# Patient Record
Sex: FEMALE | Race: WHITE | NOT HISPANIC OR LATINO | Employment: OTHER | URBAN - METROPOLITAN AREA
[De-identification: names, ages, dates, MRNs, and addresses within clinical notes are randomized per-mention and may not be internally consistent; named-entity substitution may affect disease eponyms.]

---

## 2023-01-01 ENCOUNTER — APPOINTMENT (OUTPATIENT)
Dept: RADIOLOGY | Facility: HOSPITAL | Age: 73
DRG: 312 | End: 2023-01-01
Payer: MEDICARE

## 2023-01-01 ENCOUNTER — NURSING HOME VISIT (OUTPATIENT)
Dept: POST ACUTE CARE | Facility: EXTERNAL LOCATION | Age: 73
End: 2023-01-01
Payer: MEDICARE

## 2023-01-01 ENCOUNTER — APPOINTMENT (OUTPATIENT)
Dept: DIALYSIS | Facility: HOSPITAL | Age: 73
End: 2023-01-01
Payer: MEDICARE

## 2023-01-01 ENCOUNTER — HOME CARE VISIT (OUTPATIENT)
Dept: HOME HEALTH SERVICES | Facility: HOME HEALTH | Age: 73
End: 2023-01-01
Payer: MEDICARE

## 2023-01-01 ENCOUNTER — HOME CARE VISIT (OUTPATIENT)
Dept: HOME HEALTH SERVICES | Facility: HOME HEALTH | Age: 73
End: 2023-01-01

## 2023-01-01 ENCOUNTER — APPOINTMENT (OUTPATIENT)
Dept: CARDIAC SURGERY | Facility: CLINIC | Age: 73
End: 2023-01-01
Payer: MEDICARE

## 2023-01-01 ENCOUNTER — TELEPHONE (OUTPATIENT)
Dept: PRIMARY CARE | Facility: CLINIC | Age: 73
End: 2023-01-01

## 2023-01-01 ENCOUNTER — APPOINTMENT (OUTPATIENT)
Dept: PRIMARY CARE | Facility: CLINIC | Age: 73
End: 2023-01-01
Payer: MEDICARE

## 2023-01-01 ENCOUNTER — HOME HEALTH ADMISSION (OUTPATIENT)
Dept: HOME HEALTH SERVICES | Facility: HOME HEALTH | Age: 73
End: 2023-01-01
Payer: MEDICARE

## 2023-01-01 ENCOUNTER — APPOINTMENT (OUTPATIENT)
Dept: PAIN MEDICINE | Facility: CLINIC | Age: 73
End: 2023-01-01
Payer: MEDICARE

## 2023-01-01 ENCOUNTER — TELEPHONE (OUTPATIENT)
Dept: CARDIAC SURGERY | Facility: CLINIC | Age: 73
End: 2023-01-01
Payer: MEDICARE

## 2023-01-01 ENCOUNTER — HOSPITAL ENCOUNTER (INPATIENT)
Facility: HOSPITAL | Age: 73
LOS: 30 days | Discharge: NURSING, REHAB OR LONG-TERM CARE | DRG: 312 | End: 2024-01-05
Attending: EMERGENCY MEDICINE | Admitting: STUDENT IN AN ORGANIZED HEALTH CARE EDUCATION/TRAINING PROGRAM
Payer: MEDICARE

## 2023-01-01 ENCOUNTER — NURSING HOME VISIT (OUTPATIENT)
Dept: POST ACUTE CARE | Facility: EXTERNAL LOCATION | Age: 73
End: 2023-01-01

## 2023-01-01 ENCOUNTER — APPOINTMENT (OUTPATIENT)
Dept: CARDIOLOGY | Facility: HOSPITAL | Age: 73
DRG: 312 | End: 2023-01-01
Payer: MEDICARE

## 2023-01-01 ENCOUNTER — TELEPHONE (OUTPATIENT)
Dept: HEMATOLOGY/ONCOLOGY | Facility: CLINIC | Age: 73
End: 2023-01-01
Payer: MEDICARE

## 2023-01-01 ENCOUNTER — TELEPHONE (OUTPATIENT)
Dept: CARDIAC SURGERY | Facility: CLINIC | Age: 73
End: 2023-01-01

## 2023-01-01 ENCOUNTER — HOSPITAL ENCOUNTER (OUTPATIENT)
Dept: DATA CONVERSION | Facility: HOSPITAL | Age: 73
Discharge: HOME | End: 2023-06-05
Attending: EMERGENCY MEDICINE

## 2023-01-01 VITALS
SYSTOLIC BLOOD PRESSURE: 90 MMHG | HEART RATE: 68 BPM | TEMPERATURE: 98.2 F | DIASTOLIC BLOOD PRESSURE: 54 MMHG | OXYGEN SATURATION: 96 % | RESPIRATION RATE: 20 BRPM

## 2023-01-01 VITALS
BODY MASS INDEX: 29.44 KG/M2 | OXYGEN SATURATION: 95 % | HEART RATE: 64 BPM | SYSTOLIC BLOOD PRESSURE: 92 MMHG | TEMPERATURE: 97.4 F | RESPIRATION RATE: 18 BRPM | DIASTOLIC BLOOD PRESSURE: 50 MMHG | WEIGHT: 160 LBS | HEIGHT: 62 IN

## 2023-01-01 VITALS
TEMPERATURE: 97 F | OXYGEN SATURATION: 99 % | SYSTOLIC BLOOD PRESSURE: 96 MMHG | DIASTOLIC BLOOD PRESSURE: 54 MMHG | RESPIRATION RATE: 16 BRPM | HEART RATE: 76 BPM

## 2023-01-01 VITALS
HEART RATE: 88 BPM | SYSTOLIC BLOOD PRESSURE: 110 MMHG | OXYGEN SATURATION: 95 % | DIASTOLIC BLOOD PRESSURE: 60 MMHG | RESPIRATION RATE: 16 BRPM | TEMPERATURE: 97 F

## 2023-01-01 VITALS
TEMPERATURE: 97.7 F | RESPIRATION RATE: 18 BRPM | DIASTOLIC BLOOD PRESSURE: 52 MMHG | OXYGEN SATURATION: 98 % | HEART RATE: 72 BPM | SYSTOLIC BLOOD PRESSURE: 100 MMHG

## 2023-01-01 VITALS
SYSTOLIC BLOOD PRESSURE: 106 MMHG | RESPIRATION RATE: 18 BRPM | HEART RATE: 76 BPM | TEMPERATURE: 98.2 F | DIASTOLIC BLOOD PRESSURE: 56 MMHG | OXYGEN SATURATION: 96 %

## 2023-01-01 VITALS — TEMPERATURE: 98.4 F

## 2023-01-01 VITALS — TEMPERATURE: 96.6 F

## 2023-01-01 DIAGNOSIS — C50.811 BILATERAL MALIGNANT NEOPLASM OF OVERLAPPING SITES OF BREAST IN FEMALE, UNSPECIFIED ESTROGEN RECEPTOR STATUS (MULTI): Primary | ICD-10-CM

## 2023-01-01 DIAGNOSIS — Z79.4 TYPE 2 DIABETES MELLITUS WITH OTHER SPECIFIED COMPLICATION, WITH LONG-TERM CURRENT USE OF INSULIN (MULTI): ICD-10-CM

## 2023-01-01 DIAGNOSIS — Z87.448 HISTORY OF END STAGE RENAL DISEASE: ICD-10-CM

## 2023-01-01 DIAGNOSIS — R19.7 DIARRHEA OF PRESUMED INFECTIOUS ORIGIN: Primary | ICD-10-CM

## 2023-01-01 DIAGNOSIS — Z17.0 MALIGNANT NEOPLASM OF OVERLAPPING SITES OF RIGHT BREAST IN FEMALE, ESTROGEN RECEPTOR POSITIVE (MULTI): ICD-10-CM

## 2023-01-01 DIAGNOSIS — I10 PRIMARY HYPERTENSION: ICD-10-CM

## 2023-01-01 DIAGNOSIS — Z95.1 S/P CABG (CORONARY ARTERY BYPASS GRAFT): Primary | ICD-10-CM

## 2023-01-01 DIAGNOSIS — S00.83XA CONTUSION OF OTHER PART OF HEAD, INITIAL ENCOUNTER: Primary | ICD-10-CM

## 2023-01-01 DIAGNOSIS — E11.69 TYPE 2 DIABETES MELLITUS WITH OTHER SPECIFIED COMPLICATION, WITH LONG-TERM CURRENT USE OF INSULIN (MULTI): Primary | ICD-10-CM

## 2023-01-01 DIAGNOSIS — Z17.0 MALIGNANT NEOPLASM OF OVERLAPPING SITES OF LEFT BREAST IN FEMALE, ESTROGEN RECEPTOR POSITIVE (MULTI): Primary | ICD-10-CM

## 2023-01-01 DIAGNOSIS — N18.4 TYPE 2 DIABETES MELLITUS WITH STAGE 4 CHRONIC KIDNEY DISEASE, WITHOUT LONG-TERM CURRENT USE OF INSULIN (MULTI): ICD-10-CM

## 2023-01-01 DIAGNOSIS — E11.69 TYPE 2 DIABETES MELLITUS WITH OTHER SPECIFIED COMPLICATION, WITH LONG-TERM CURRENT USE OF INSULIN (MULTI): ICD-10-CM

## 2023-01-01 DIAGNOSIS — C50.812 BILATERAL MALIGNANT NEOPLASM OF OVERLAPPING SITES OF BREAST IN FEMALE, UNSPECIFIED ESTROGEN RECEPTOR STATUS (MULTI): Primary | ICD-10-CM

## 2023-01-01 DIAGNOSIS — I05.8 MITRAL VALVE MASS: Primary | ICD-10-CM

## 2023-01-01 DIAGNOSIS — E78.2 MIXED HYPERLIPIDEMIA: ICD-10-CM

## 2023-01-01 DIAGNOSIS — I48.0 PAROXYSMAL ATRIAL FIBRILLATION (MULTI): ICD-10-CM

## 2023-01-01 DIAGNOSIS — Z79.4 TYPE 2 DIABETES MELLITUS WITH OTHER SPECIFIED COMPLICATION, WITH LONG-TERM CURRENT USE OF INSULIN (MULTI): Primary | ICD-10-CM

## 2023-01-01 DIAGNOSIS — M86.142: ICD-10-CM

## 2023-01-01 DIAGNOSIS — E87.20 LACTIC ACIDOSIS: ICD-10-CM

## 2023-01-01 DIAGNOSIS — B99.9 INFECTION: ICD-10-CM

## 2023-01-01 DIAGNOSIS — C50.812 MALIGNANT NEOPLASM OF OVERLAPPING SITES OF LEFT BREAST IN FEMALE, ESTROGEN RECEPTOR POSITIVE (MULTI): Primary | ICD-10-CM

## 2023-01-01 DIAGNOSIS — L08.9 WOUND INFECTION: ICD-10-CM

## 2023-01-01 DIAGNOSIS — I95.9 HYPOTENSION, UNSPECIFIED HYPOTENSION TYPE: Primary | ICD-10-CM

## 2023-01-01 DIAGNOSIS — I48.0 PAROXYSMAL ATRIAL FIBRILLATION (MULTI): Primary | ICD-10-CM

## 2023-01-01 DIAGNOSIS — I50.42 CHRONIC COMBINED SYSTOLIC AND DIASTOLIC HEART FAILURE (MULTI): ICD-10-CM

## 2023-01-01 DIAGNOSIS — B37.9 INFECTION DUE TO YEAST: Primary | ICD-10-CM

## 2023-01-01 DIAGNOSIS — E11.21 TYPE 2 DIABETES MELLITUS WITH DIABETIC NEPHROPATHY, UNSPECIFIED WHETHER LONG TERM INSULIN USE (MULTI): Primary | ICD-10-CM

## 2023-01-01 DIAGNOSIS — E11.22 TYPE 2 DIABETES MELLITUS WITH STAGE 4 CHRONIC KIDNEY DISEASE, WITHOUT LONG-TERM CURRENT USE OF INSULIN (MULTI): ICD-10-CM

## 2023-01-01 DIAGNOSIS — B37.9 YEAST INFECTION: Primary | ICD-10-CM

## 2023-01-01 DIAGNOSIS — I73.9 PERIPHERAL VASCULAR DISEASE (CMS-HCC): ICD-10-CM

## 2023-01-01 DIAGNOSIS — A04.72 C. DIFFICILE DIARRHEA: ICD-10-CM

## 2023-01-01 DIAGNOSIS — C50.811 MALIGNANT NEOPLASM OF OVERLAPPING SITES OF RIGHT BREAST IN FEMALE, ESTROGEN RECEPTOR POSITIVE (MULTI): ICD-10-CM

## 2023-01-01 DIAGNOSIS — T14.8XXA WOUND INFECTION: ICD-10-CM

## 2023-01-01 DIAGNOSIS — I50.42 CHRONIC COMBINED SYSTOLIC AND DIASTOLIC HEART FAILURE (MULTI): Primary | ICD-10-CM

## 2023-01-01 DIAGNOSIS — I73.9 PERIPHERAL VASCULAR DISEASE (CMS-HCC): Primary | ICD-10-CM

## 2023-01-01 DIAGNOSIS — R09.89 ABSENT PEDAL PULSES: Chronic | ICD-10-CM

## 2023-01-01 DIAGNOSIS — N18.4 CHRONIC KIDNEY DISEASE (CKD), STAGE IV (SEVERE) (MULTI): ICD-10-CM

## 2023-01-01 LAB
ACANTHOCYTES BLD QL SMEAR: NORMAL
ALBUMIN SERPL-MCNC: 2.2 G/DL (ref 3.5–5)
ALBUMIN SERPL-MCNC: 2.5 G/DL (ref 3.5–5)
ALBUMIN SERPL-MCNC: 2.6 G/DL (ref 3.5–5)
ALBUMIN SERPL-MCNC: 2.7 G/DL (ref 3.5–5)
ALBUMIN SERPL-MCNC: 2.8 G/DL (ref 3.5–5)
ALBUMIN SERPL-MCNC: 2.9 G/DL (ref 3.5–5)
ALBUMIN SERPL-MCNC: 3 G/DL (ref 3.5–5)
ALBUMIN SERPL-MCNC: 3.1 G/DL (ref 3.5–5)
ALBUMIN SERPL-MCNC: 3.2 G/DL (ref 3.5–5)
ALBUMIN SERPL-MCNC: 3.8 GM/DL (ref 3.5–5)
ALBUMIN/GLOB SERPL: 0.9 RATIO (ref 1.5–3)
ALP BLD-CCNC: 108 U/L (ref 35–125)
ALP BLD-CCNC: 126 U/L (ref 35–125)
ALP BLD-CCNC: 78 U/L (ref 35–125)
ALP BLD-CCNC: 88 U/L (ref 35–125)
ALT SERPL-CCNC: 13 U/L (ref 5–40)
ALT SERPL-CCNC: 21 U/L (ref 5–40)
ALT SERPL-CCNC: 24 U/L (ref 5–40)
ALT SERPL-CCNC: 7 U/L (ref 5–40)
ANION GAP BLDA CALCULATED.4IONS-SCNC: 5 MMO/L (ref 10–25)
ANION GAP BLDA CALCULATED.4IONS-SCNC: 6 MMO/L (ref 10–25)
ANION GAP SERPL CALC-SCNC: 10 MMOL/L
ANION GAP SERPL CALC-SCNC: 11 MMOL/L
ANION GAP SERPL CALC-SCNC: 13 MMOL/L
ANION GAP SERPL CALC-SCNC: 14 MMOL/L
ANION GAP SERPL CALC-SCNC: 14 MMOL/L
ANION GAP SERPL CALC-SCNC: 8 MMOL/L
ANION GAP SERPL CALC-SCNC: 9 MMOL/L
ANION GAP SERPL CALCULATED.3IONS-SCNC: 16 MMOL/L (ref 0–19)
ANTICOAGULANT: NORMAL
APPARATUS: ABNORMAL
APPARATUS: ABNORMAL
APTT PPP: 25.5 SEC (ref 22–32.5)
ARTERIAL PATENCY WRIST A: POSITIVE
ARTERIAL PATENCY WRIST A: POSITIVE
AST SERPL-CCNC: 14 U/L (ref 5–40)
AST SERPL-CCNC: 21 U/L (ref 5–40)
AST SERPL-CCNC: 40 U/L (ref 5–40)
AST SERPL-CCNC: 42 U/L (ref 5–40)
ATRIAL RATE: 85 BPM
BACTERIA BLD CULT: NORMAL
BACTERIA FLD CULT: NORMAL
BACTERIA SPEC RESP CULT: ABNORMAL
BASE EXCESS BLDA CALC-SCNC: 6.6 MMOL/L (ref -2–3)
BASE EXCESS BLDA CALC-SCNC: 7.1 MMOL/L (ref -2–3)
BASO STIPL BLD QL SMEAR: PRESENT
BASOPHILS # BLD AUTO: 0.01 X10*3/UL (ref 0–0.1)
BASOPHILS # BLD AUTO: 0.05 X10*3/UL (ref 0–0.1)
BASOPHILS # BLD AUTO: 0.06 K/UL (ref 0–0.22)
BASOPHILS # BLD AUTO: 0.09 X10*3/UL (ref 0–0.1)
BASOPHILS # BLD AUTO: 0.1 X10*3/UL (ref 0–0.1)
BASOPHILS # BLD AUTO: 0.11 X10*3/UL (ref 0–0.1)
BASOPHILS # BLD AUTO: 0.11 X10*3/UL (ref 0–0.1)
BASOPHILS # BLD AUTO: 0.12 X10*3/UL (ref 0–0.1)
BASOPHILS # BLD AUTO: 0.12 X10*3/UL (ref 0–0.1)
BASOPHILS # BLD AUTO: 0.13 X10*3/UL (ref 0–0.1)
BASOPHILS # BLD AUTO: 0.14 X10*3/UL (ref 0–0.1)
BASOPHILS # BLD AUTO: 0.14 X10*3/UL (ref 0–0.1)
BASOPHILS # BLD AUTO: 0.18 X10*3/UL (ref 0–0.1)
BASOPHILS NFR BLD AUTO: 0.1 %
BASOPHILS NFR BLD AUTO: 0.6 %
BASOPHILS NFR BLD AUTO: 0.7 % (ref 0–1)
BASOPHILS NFR BLD AUTO: 0.8 %
BASOPHILS NFR BLD AUTO: 0.9 %
BASOPHILS NFR BLD AUTO: 0.9 %
BASOPHILS NFR BLD AUTO: 1 %
BASOPHILS NFR BLD AUTO: 1.1 %
BASOPHILS NFR BLD AUTO: 1.1 %
BASOPHILS NFR BLD AUTO: 1.7 %
BASOPHILS NFR FLD MANUAL: 0 %
BILIRUB DIRECT SERPL-MCNC: 0.5 MG/DL (ref 0–0.2)
BILIRUB SERPL-MCNC: 0.6 MG/DL (ref 0.1–1.2)
BILIRUB SERPL-MCNC: 1 MG/DL (ref 0.1–1.2)
BILIRUB SERPL-MCNC: 1.1 MG/DL (ref 0.1–1.2)
BILIRUB SERPL-MCNC: 1.2 MG/DL (ref 0.1–1.2)
BLASTS NFR FLD MANUAL: 0 %
BODY TEMPERATURE: 37 DEGREES CELSIUS
BODY TEMPERATURE: 37 DEGREES CELSIUS
BUN SERPL-MCNC: 10 MG/DL (ref 8–25)
BUN SERPL-MCNC: 11 MG/DL (ref 8–25)
BUN SERPL-MCNC: 11 MG/DL (ref 8–25)
BUN SERPL-MCNC: 12 MG/DL (ref 8–25)
BUN SERPL-MCNC: 13 MG/DL (ref 8–25)
BUN SERPL-MCNC: 14 MG/DL (ref 8–25)
BUN SERPL-MCNC: 14 MG/DL (ref 8–25)
BUN SERPL-MCNC: 15 MG/DL (ref 8–25)
BUN SERPL-MCNC: 15 MG/DL (ref 8–25)
BUN SERPL-MCNC: 16 MG/DL (ref 8–25)
BUN SERPL-MCNC: 16 MG/DL (ref 8–25)
BUN SERPL-MCNC: 17 MG/DL (ref 8–25)
BUN SERPL-MCNC: 18 MG/DL (ref 8–25)
BUN SERPL-MCNC: 18 MG/DL (ref 8–25)
BUN SERPL-MCNC: 24 MG/DL (ref 8–25)
BUN SERPL-MCNC: 25 MG/DL (ref 8–25)
BUN SERPL-MCNC: 25 MG/DL (ref 8–25)
BUN SERPL-MCNC: 5 MG/DL (ref 8–25)
BUN SERPL-MCNC: 6 MG/DL (ref 8–25)
BUN SERPL-MCNC: 7 MG/DL (ref 8–25)
BUN SERPL-MCNC: 8 MG/DL (ref 8–25)
BUN SERPL-MCNC: 8 MG/DL (ref 8–25)
BUN SERPL-MCNC: 9 MG/DL (ref 8–25)
BUN SERPL-MCNC: <3 MG/DL (ref 8–25)
BUN/CREAT SERPL: 2.8 RATIO (ref 8–21)
BURR CELLS BLD QL SMEAR: NORMAL
BURR CELLS BLD QL SMEAR: NORMAL
C DIF TOX TCDA+TCDB STL QL NAA+PROBE: DETECTED
C DIFF TOX A+B STL QL IA: NEGATIVE
CA-I BLDA-SCNC: 1.08 MMOL/L (ref 1.1–1.33)
CA-I BLDA-SCNC: 1.16 MMOL/L (ref 1.1–1.33)
CALCIUM SERPL-MCNC: 8.1 MG/DL (ref 8.5–10.4)
CALCIUM SERPL-MCNC: 8.1 MG/DL (ref 8.5–10.4)
CALCIUM SERPL-MCNC: 8.2 MG/DL (ref 8.5–10.4)
CALCIUM SERPL-MCNC: 8.3 MG/DL (ref 8.5–10.4)
CALCIUM SERPL-MCNC: 8.4 MG/DL (ref 8.5–10.4)
CALCIUM SERPL-MCNC: 8.4 MG/DL (ref 8.5–10.4)
CALCIUM SERPL-MCNC: 8.5 MG/DL (ref 8.5–10.4)
CALCIUM SERPL-MCNC: 8.6 MG/DL (ref 8.5–10.4)
CALCIUM SERPL-MCNC: 8.7 MG/DL (ref 8.5–10.4)
CALCIUM SERPL-MCNC: 8.7 MG/DL (ref 8.5–10.4)
CALCIUM SERPL-MCNC: 8.8 MG/DL (ref 8.5–10.4)
CALCIUM SERPL-MCNC: 9 MG/DL (ref 8.5–10.4)
CALCIUM SERPL-MCNC: 9.6 MG/DL (ref 8.5–10.4)
CHLORIDE BLDA-SCNC: 100 MMOL/L (ref 98–107)
CHLORIDE BLDA-SCNC: 98 MMOL/L (ref 98–107)
CHLORIDE SERPL-SCNC: 100 MMOL/L (ref 97–107)
CHLORIDE SERPL-SCNC: 101 MMOL/L (ref 97–107)
CHLORIDE SERPL-SCNC: 102 MMOL/L (ref 97–107)
CHLORIDE SERPL-SCNC: 90 MMOL/L (ref 97–107)
CHLORIDE SERPL-SCNC: 94 MMOL/L (ref 97–107)
CHLORIDE SERPL-SCNC: 95 MMOL/L (ref 97–107)
CHLORIDE SERPL-SCNC: 96 MMOL/L (ref 97–107)
CHLORIDE SERPL-SCNC: 97 MMOL/L (ref 97–107)
CHLORIDE SERPL-SCNC: 98 MMOL/L (ref 97–107)
CHLORIDE SERPL-SCNC: 99 MMOL/L (ref 97–107)
CLARITY FLD: CLEAR
CO2 SERPL-SCNC: 22 MMOL/L (ref 24–31)
CO2 SERPL-SCNC: 23 MMOL/L (ref 24–31)
CO2 SERPL-SCNC: 23 MMOL/L (ref 24–31)
CO2 SERPL-SCNC: 24 MMOL/L (ref 24–31)
CO2 SERPL-SCNC: 25 MMOL/L (ref 24–31)
CO2 SERPL-SCNC: 26 MMOL/L (ref 24–31)
CO2 SERPL-SCNC: 26 MMOL/L (ref 24–31)
CO2 SERPL-SCNC: 27 MMOL/L (ref 24–31)
CO2 SERPL-SCNC: 28 MMOL/L (ref 24–31)
CO2 SERPL-SCNC: 29 MMOL/L (ref 24–31)
CO2 SERPL-SCNC: 30 MMOL/L (ref 24–31)
CO2 SERPL-SCNC: 32 MMOL/L (ref 24–31)
CO2 SERPL-SCNC: 32 MMOL/L (ref 24–31)
COLOR FLD: YELLOW
CORTIS AM PEAK SERPL-MSCNC: 17.4 UG/DL (ref 5–20)
CORTIS SERPL-MCNC: 12 UG/DL (ref 2.5–20)
CORTIS SERPL-MCNC: 17.7 UG/DL (ref 2.5–20)
CORTIS SERPL-MCNC: 23.4 UG/DL (ref 2.5–20)
CORTIS SERPL-MCNC: 26.3 UG/DL (ref 2.5–20)
CREAT SERPL-MCNC: 1.2 MG/DL (ref 0.4–1.6)
CREAT SERPL-MCNC: 1.7 MG/DL (ref 0.4–1.6)
CREAT SERPL-MCNC: 1.9 MG/DL (ref 0.4–1.6)
CREAT SERPL-MCNC: 2 MG/DL (ref 0.4–1.6)
CREAT SERPL-MCNC: 2 MG/DL (ref 0.4–1.6)
CREAT SERPL-MCNC: 2.1 MG/DL (ref 0.4–1.6)
CREAT SERPL-MCNC: 2.1 MG/DL (ref 0.4–1.6)
CREAT SERPL-MCNC: 2.2 MG/DL (ref 0.4–1.6)
CREAT SERPL-MCNC: 2.5 MG/DL (ref 0.4–1.6)
CREAT SERPL-MCNC: 2.5 MG/DL (ref 0.4–1.6)
CREAT SERPL-MCNC: 2.6 MG/DL (ref 0.4–1.6)
CREAT SERPL-MCNC: 2.6 MG/DL (ref 0.4–1.6)
CREAT SERPL-MCNC: 2.7 MG/DL (ref 0.4–1.6)
CREAT SERPL-MCNC: 2.9 MG/DL (ref 0.4–1.6)
CREAT SERPL-MCNC: 3.1 MG/DL (ref 0.4–1.6)
CREAT SERPL-MCNC: 3.1 MG/DL (ref 0.4–1.6)
CREAT SERPL-MCNC: 3.2 MG/DL (ref 0.4–1.6)
CREAT SERPL-MCNC: 3.3 MG/DL (ref 0.4–1.6)
CREAT SERPL-MCNC: 3.3 MG/DL (ref 0.4–1.6)
CREAT SERPL-MCNC: 3.4 MG/DL (ref 0.4–1.6)
CREAT SERPL-MCNC: 3.4 MG/DL (ref 0.4–1.6)
CREAT SERPL-MCNC: 3.6 MG/DL (ref 0.4–1.6)
CREAT SERPL-MCNC: 3.6 MG/DL (ref 0.4–1.6)
CREAT SERPL-MCNC: 4.2 MG/DL (ref 0.4–1.6)
CRP SERPL-MCNC: 5.2 MG/DL (ref 0–2)
DEPRECATED RDW RBC AUTO: 55.5 FL (ref 37–54)
DIFFERENTIAL METHOD BLD: ABNORMAL
EOSINOPHIL # BLD AUTO: 0 X10*3/UL (ref 0–0.4)
EOSINOPHIL # BLD AUTO: 0.01 X10*3/UL (ref 0–0.4)
EOSINOPHIL # BLD AUTO: 0.26 K/UL (ref 0–0.45)
EOSINOPHIL # BLD AUTO: 0.38 X10*3/UL (ref 0–0.4)
EOSINOPHIL # BLD AUTO: 0.41 X10*3/UL (ref 0–0.4)
EOSINOPHIL # BLD AUTO: 0.48 X10*3/UL (ref 0–0.4)
EOSINOPHIL # BLD AUTO: 0.5 X10*3/UL (ref 0–0.4)
EOSINOPHIL # BLD AUTO: 0.53 X10*3/UL (ref 0–0.4)
EOSINOPHIL # BLD AUTO: 0.57 X10*3/UL (ref 0–0.4)
EOSINOPHIL # BLD AUTO: 0.6 X10*3/UL (ref 0–0.4)
EOSINOPHIL # BLD AUTO: 0.63 X10*3/UL (ref 0–0.4)
EOSINOPHIL # BLD AUTO: 0.65 X10*3/UL (ref 0–0.4)
EOSINOPHIL # BLD AUTO: 0.69 X10*3/UL (ref 0–0.4)
EOSINOPHIL # BLD AUTO: 0.71 X10*3/UL (ref 0–0.4)
EOSINOPHIL # BLD AUTO: 0.79 X10*3/UL (ref 0–0.4)
EOSINOPHIL NFR BLD AUTO: 0 %
EOSINOPHIL NFR BLD AUTO: 0.1 %
EOSINOPHIL NFR BLD AUTO: 2.7 %
EOSINOPHIL NFR BLD AUTO: 3.5 %
EOSINOPHIL NFR BLD AUTO: 3.6 %
EOSINOPHIL NFR BLD AUTO: 4.1 %
EOSINOPHIL NFR BLD AUTO: 4.6 %
EOSINOPHIL NFR BLD AUTO: 4.7 %
EOSINOPHIL NFR BLD AUTO: 4.8 %
EOSINOPHIL NFR BLD AUTO: 4.8 %
EOSINOPHIL NFR BLD AUTO: 5 %
EOSINOPHIL NFR BLD AUTO: 5.4 %
EOSINOPHIL NFR BLD AUTO: 6.4 %
EOSINOPHIL NFR BLD AUTO: 6.7 %
EOSINOPHIL NFR BLD: 2.9 % (ref 0–3)
EOSINOPHIL NFR FLD MANUAL: 0 %
ERYTHROCYTE [DISTWIDTH] IN BLOOD BY AUTOMATED COUNT: 15.6 % (ref 11.7–15)
ERYTHROCYTE [DISTWIDTH] IN BLOOD BY AUTOMATED COUNT: 20.2 % (ref 11.5–14.5)
ERYTHROCYTE [DISTWIDTH] IN BLOOD BY AUTOMATED COUNT: 20.6 % (ref 11.5–14.5)
ERYTHROCYTE [DISTWIDTH] IN BLOOD BY AUTOMATED COUNT: 21.1 % (ref 11.5–14.5)
ERYTHROCYTE [DISTWIDTH] IN BLOOD BY AUTOMATED COUNT: 21.1 % (ref 11.5–14.5)
ERYTHROCYTE [DISTWIDTH] IN BLOOD BY AUTOMATED COUNT: 21.2 % (ref 11.5–14.5)
ERYTHROCYTE [DISTWIDTH] IN BLOOD BY AUTOMATED COUNT: 21.2 % (ref 11.5–14.5)
ERYTHROCYTE [DISTWIDTH] IN BLOOD BY AUTOMATED COUNT: 21.4 % (ref 11.5–14.5)
ERYTHROCYTE [DISTWIDTH] IN BLOOD BY AUTOMATED COUNT: 21.7 % (ref 11.5–14.5)
ERYTHROCYTE [DISTWIDTH] IN BLOOD BY AUTOMATED COUNT: 21.7 % (ref 11.5–14.5)
ERYTHROCYTE [DISTWIDTH] IN BLOOD BY AUTOMATED COUNT: 21.8 % (ref 11.5–14.5)
ERYTHROCYTE [DISTWIDTH] IN BLOOD BY AUTOMATED COUNT: 21.8 % (ref 11.5–14.5)
ERYTHROCYTE [DISTWIDTH] IN BLOOD BY AUTOMATED COUNT: 21.9 % (ref 11.5–14.5)
ERYTHROCYTE [DISTWIDTH] IN BLOOD BY AUTOMATED COUNT: 22.1 % (ref 11.5–14.5)
ERYTHROCYTE [DISTWIDTH] IN BLOOD BY AUTOMATED COUNT: 22.5 % (ref 11.5–14.5)
ERYTHROCYTE [DISTWIDTH] IN BLOOD BY AUTOMATED COUNT: 22.5 % (ref 11.5–14.5)
ERYTHROCYTE [DISTWIDTH] IN BLOOD BY AUTOMATED COUNT: 23 % (ref 11.5–14.5)
ERYTHROCYTE [DISTWIDTH] IN BLOOD BY AUTOMATED COUNT: 23.2 % (ref 11.5–14.5)
ERYTHROCYTE [DISTWIDTH] IN BLOOD BY AUTOMATED COUNT: 23.3 % (ref 11.5–14.5)
ERYTHROCYTE [DISTWIDTH] IN BLOOD BY AUTOMATED COUNT: 23.4 % (ref 11.5–14.5)
ERYTHROCYTE [DISTWIDTH] IN BLOOD BY AUTOMATED COUNT: 23.6 % (ref 11.5–14.5)
ERYTHROCYTE [DISTWIDTH] IN BLOOD BY AUTOMATED COUNT: 23.6 % (ref 11.5–14.5)
ERYTHROCYTE [DISTWIDTH] IN BLOOD BY AUTOMATED COUNT: 23.7 % (ref 11.5–14.5)
ERYTHROCYTE [DISTWIDTH] IN BLOOD BY AUTOMATED COUNT: 23.8 % (ref 11.5–14.5)
ERYTHROCYTE [DISTWIDTH] IN BLOOD BY AUTOMATED COUNT: 23.9 % (ref 11.5–14.5)
ERYTHROCYTE [DISTWIDTH] IN BLOOD BY AUTOMATED COUNT: 25.5 % (ref 11.5–14.5)
FLUAV RNA RESP QL NAA+PROBE: NOT DETECTED
FLUBV RNA RESP QL NAA+PROBE: NOT DETECTED
GFR SERPL CREATININE-BSD FRML MDRD: 11 ML/MIN/1.73M*2
GFR SERPL CREATININE-BSD FRML MDRD: 13 ML/MIN/1.73M*2
GFR SERPL CREATININE-BSD FRML MDRD: 13 ML/MIN/1.73M*2
GFR SERPL CREATININE-BSD FRML MDRD: 14 ML/MIN/1.73M*2
GFR SERPL CREATININE-BSD FRML MDRD: 15 ML/MIN/1.73M*2
GFR SERPL CREATININE-BSD FRML MDRD: 16 ML/MIN/1.73M*2
GFR SERPL CREATININE-BSD FRML MDRD: 16 ML/MIN/1.73M*2
GFR SERPL CREATININE-BSD FRML MDRD: 17 ML/MIN/1.73M*2
GFR SERPL CREATININE-BSD FRML MDRD: 18 ML/MIN/1.73M*2
GFR SERPL CREATININE-BSD FRML MDRD: 19 ML/MIN/1.73M*2
GFR SERPL CREATININE-BSD FRML MDRD: 19 ML/MIN/1.73M*2
GFR SERPL CREATININE-BSD FRML MDRD: 20 ML/MIN/1.73 M2
GFR SERPL CREATININE-BSD FRML MDRD: 20 ML/MIN/1.73M*2
GFR SERPL CREATININE-BSD FRML MDRD: 23 ML/MIN/1.73M*2
GFR SERPL CREATININE-BSD FRML MDRD: 25 ML/MIN/1.73M*2
GFR SERPL CREATININE-BSD FRML MDRD: 25 ML/MIN/1.73M*2
GFR SERPL CREATININE-BSD FRML MDRD: 26 ML/MIN/1.73M*2
GFR SERPL CREATININE-BSD FRML MDRD: 26 ML/MIN/1.73M*2
GFR SERPL CREATININE-BSD FRML MDRD: 28 ML/MIN/1.73M*2
GFR SERPL CREATININE-BSD FRML MDRD: 32 ML/MIN/1.73M*2
GFR SERPL CREATININE-BSD FRML MDRD: 48 ML/MIN/1.73M*2
GIANT PLATELETS BLD QL SMEAR: NORMAL
GLOBULIN SER-MCNC: 4.1 G/DL (ref 1.9–3.7)
GLUCOSE BLD MANUAL STRIP-MCNC: 101 MG/DL (ref 74–99)
GLUCOSE BLD MANUAL STRIP-MCNC: 102 MG/DL (ref 74–99)
GLUCOSE BLD MANUAL STRIP-MCNC: 104 MG/DL (ref 74–99)
GLUCOSE BLD MANUAL STRIP-MCNC: 105 MG/DL (ref 74–99)
GLUCOSE BLD MANUAL STRIP-MCNC: 106 MG/DL (ref 74–99)
GLUCOSE BLD MANUAL STRIP-MCNC: 107 MG/DL (ref 74–99)
GLUCOSE BLD MANUAL STRIP-MCNC: 109 MG/DL (ref 74–99)
GLUCOSE BLD MANUAL STRIP-MCNC: 111 MG/DL (ref 74–99)
GLUCOSE BLD MANUAL STRIP-MCNC: 112 MG/DL (ref 74–99)
GLUCOSE BLD MANUAL STRIP-MCNC: 113 MG/DL (ref 74–99)
GLUCOSE BLD MANUAL STRIP-MCNC: 113 MG/DL (ref 74–99)
GLUCOSE BLD MANUAL STRIP-MCNC: 114 MG/DL (ref 74–99)
GLUCOSE BLD MANUAL STRIP-MCNC: 117 MG/DL (ref 74–99)
GLUCOSE BLD MANUAL STRIP-MCNC: 117 MG/DL (ref 74–99)
GLUCOSE BLD MANUAL STRIP-MCNC: 118 MG/DL (ref 74–99)
GLUCOSE BLD MANUAL STRIP-MCNC: 118 MG/DL (ref 74–99)
GLUCOSE BLD MANUAL STRIP-MCNC: 119 MG/DL (ref 74–99)
GLUCOSE BLD MANUAL STRIP-MCNC: 120 MG/DL (ref 74–99)
GLUCOSE BLD MANUAL STRIP-MCNC: 122 MG/DL (ref 74–99)
GLUCOSE BLD MANUAL STRIP-MCNC: 122 MG/DL (ref 74–99)
GLUCOSE BLD MANUAL STRIP-MCNC: 124 MG/DL (ref 74–99)
GLUCOSE BLD MANUAL STRIP-MCNC: 124 MG/DL (ref 74–99)
GLUCOSE BLD MANUAL STRIP-MCNC: 125 MG/DL (ref 74–99)
GLUCOSE BLD MANUAL STRIP-MCNC: 127 MG/DL (ref 74–99)
GLUCOSE BLD MANUAL STRIP-MCNC: 128 MG/DL (ref 74–99)
GLUCOSE BLD MANUAL STRIP-MCNC: 128 MG/DL (ref 74–99)
GLUCOSE BLD MANUAL STRIP-MCNC: 130 MG/DL (ref 74–99)
GLUCOSE BLD MANUAL STRIP-MCNC: 131 MG/DL (ref 74–99)
GLUCOSE BLD MANUAL STRIP-MCNC: 131 MG/DL (ref 74–99)
GLUCOSE BLD MANUAL STRIP-MCNC: 132 MG/DL (ref 74–99)
GLUCOSE BLD MANUAL STRIP-MCNC: 132 MG/DL (ref 74–99)
GLUCOSE BLD MANUAL STRIP-MCNC: 133 MG/DL (ref 74–99)
GLUCOSE BLD MANUAL STRIP-MCNC: 134 MG/DL (ref 74–99)
GLUCOSE BLD MANUAL STRIP-MCNC: 134 MG/DL (ref 74–99)
GLUCOSE BLD MANUAL STRIP-MCNC: 135 MG/DL (ref 74–99)
GLUCOSE BLD MANUAL STRIP-MCNC: 135 MG/DL (ref 74–99)
GLUCOSE BLD MANUAL STRIP-MCNC: 136 MG/DL (ref 74–99)
GLUCOSE BLD MANUAL STRIP-MCNC: 136 MG/DL (ref 74–99)
GLUCOSE BLD MANUAL STRIP-MCNC: 139 MG/DL (ref 74–99)
GLUCOSE BLD MANUAL STRIP-MCNC: 140 MG/DL (ref 74–99)
GLUCOSE BLD MANUAL STRIP-MCNC: 140 MG/DL (ref 74–99)
GLUCOSE BLD MANUAL STRIP-MCNC: 142 MG/DL (ref 74–99)
GLUCOSE BLD MANUAL STRIP-MCNC: 145 MG/DL (ref 74–99)
GLUCOSE BLD MANUAL STRIP-MCNC: 145 MG/DL (ref 74–99)
GLUCOSE BLD MANUAL STRIP-MCNC: 146 MG/DL (ref 74–99)
GLUCOSE BLD MANUAL STRIP-MCNC: 146 MG/DL (ref 74–99)
GLUCOSE BLD MANUAL STRIP-MCNC: 147 MG/DL (ref 74–99)
GLUCOSE BLD MANUAL STRIP-MCNC: 149 MG/DL (ref 74–99)
GLUCOSE BLD MANUAL STRIP-MCNC: 150 MG/DL (ref 74–99)
GLUCOSE BLD MANUAL STRIP-MCNC: 151 MG/DL (ref 74–99)
GLUCOSE BLD MANUAL STRIP-MCNC: 153 MG/DL (ref 74–99)
GLUCOSE BLD MANUAL STRIP-MCNC: 154 MG/DL (ref 74–99)
GLUCOSE BLD MANUAL STRIP-MCNC: 154 MG/DL (ref 74–99)
GLUCOSE BLD MANUAL STRIP-MCNC: 157 MG/DL (ref 74–99)
GLUCOSE BLD MANUAL STRIP-MCNC: 158 MG/DL (ref 74–99)
GLUCOSE BLD MANUAL STRIP-MCNC: 159 MG/DL (ref 74–99)
GLUCOSE BLD MANUAL STRIP-MCNC: 160 MG/DL (ref 74–99)
GLUCOSE BLD MANUAL STRIP-MCNC: 163 MG/DL (ref 74–99)
GLUCOSE BLD MANUAL STRIP-MCNC: 164 MG/DL (ref 74–99)
GLUCOSE BLD MANUAL STRIP-MCNC: 165 MG/DL (ref 74–99)
GLUCOSE BLD MANUAL STRIP-MCNC: 169 MG/DL (ref 74–99)
GLUCOSE BLD MANUAL STRIP-MCNC: 172 MG/DL (ref 74–99)
GLUCOSE BLD MANUAL STRIP-MCNC: 173 MG/DL (ref 74–99)
GLUCOSE BLD MANUAL STRIP-MCNC: 174 MG/DL (ref 74–99)
GLUCOSE BLD MANUAL STRIP-MCNC: 175 MG/DL (ref 74–99)
GLUCOSE BLD MANUAL STRIP-MCNC: 175 MG/DL (ref 74–99)
GLUCOSE BLD MANUAL STRIP-MCNC: 179 MG/DL (ref 74–99)
GLUCOSE BLD MANUAL STRIP-MCNC: 189 MG/DL (ref 74–99)
GLUCOSE BLD MANUAL STRIP-MCNC: 191 MG/DL (ref 74–99)
GLUCOSE BLD MANUAL STRIP-MCNC: 204 MG/DL (ref 74–99)
GLUCOSE BLD MANUAL STRIP-MCNC: 209 MG/DL (ref 74–99)
GLUCOSE BLD MANUAL STRIP-MCNC: 210 MG/DL (ref 74–99)
GLUCOSE BLD MANUAL STRIP-MCNC: 213 MG/DL (ref 74–99)
GLUCOSE BLD MANUAL STRIP-MCNC: 221 MG/DL (ref 74–99)
GLUCOSE BLD MANUAL STRIP-MCNC: 233 MG/DL (ref 74–99)
GLUCOSE BLD MANUAL STRIP-MCNC: 242 MG/DL (ref 74–99)
GLUCOSE BLD MANUAL STRIP-MCNC: 242 MG/DL (ref 74–99)
GLUCOSE BLD MANUAL STRIP-MCNC: 244 MG/DL (ref 74–99)
GLUCOSE BLD MANUAL STRIP-MCNC: 254 MG/DL (ref 74–99)
GLUCOSE BLD MANUAL STRIP-MCNC: 262 MG/DL (ref 74–99)
GLUCOSE BLD MANUAL STRIP-MCNC: 59 MG/DL (ref 74–99)
GLUCOSE BLD MANUAL STRIP-MCNC: 64 MG/DL (ref 74–99)
GLUCOSE BLD MANUAL STRIP-MCNC: 64 MG/DL (ref 74–99)
GLUCOSE BLD MANUAL STRIP-MCNC: 72 MG/DL (ref 74–99)
GLUCOSE BLD MANUAL STRIP-MCNC: 72 MG/DL (ref 74–99)
GLUCOSE BLD MANUAL STRIP-MCNC: 84 MG/DL (ref 74–99)
GLUCOSE BLD MANUAL STRIP-MCNC: 84 MG/DL (ref 74–99)
GLUCOSE BLD MANUAL STRIP-MCNC: 85 MG/DL (ref 74–99)
GLUCOSE BLD MANUAL STRIP-MCNC: 86 MG/DL (ref 74–99)
GLUCOSE BLD MANUAL STRIP-MCNC: 86 MG/DL (ref 74–99)
GLUCOSE BLD MANUAL STRIP-MCNC: 87 MG/DL (ref 74–99)
GLUCOSE BLD MANUAL STRIP-MCNC: 88 MG/DL (ref 74–99)
GLUCOSE BLD MANUAL STRIP-MCNC: 89 MG/DL (ref 74–99)
GLUCOSE BLD MANUAL STRIP-MCNC: 90 MG/DL (ref 74–99)
GLUCOSE BLD MANUAL STRIP-MCNC: 90 MG/DL (ref 74–99)
GLUCOSE BLD MANUAL STRIP-MCNC: 93 MG/DL (ref 74–99)
GLUCOSE BLD MANUAL STRIP-MCNC: 93 MG/DL (ref 74–99)
GLUCOSE BLD MANUAL STRIP-MCNC: 95 MG/DL (ref 74–99)
GLUCOSE BLD MANUAL STRIP-MCNC: 96 MG/DL (ref 74–99)
GLUCOSE BLD MANUAL STRIP-MCNC: 99 MG/DL (ref 74–99)
GLUCOSE BLDA-MCNC: 151 MG/DL (ref 74–99)
GLUCOSE BLDA-MCNC: 80 MG/DL (ref 74–99)
GLUCOSE FLD-MCNC: 128 MG/DL
GLUCOSE SERPL-MCNC: 101 MG/DL (ref 65–99)
GLUCOSE SERPL-MCNC: 105 MG/DL (ref 65–99)
GLUCOSE SERPL-MCNC: 106 MG/DL (ref 65–99)
GLUCOSE SERPL-MCNC: 117 MG/DL (ref 65–99)
GLUCOSE SERPL-MCNC: 117 MG/DL (ref 65–99)
GLUCOSE SERPL-MCNC: 119 MG/DL (ref 65–99)
GLUCOSE SERPL-MCNC: 121 MG/DL (ref 65–99)
GLUCOSE SERPL-MCNC: 126 MG/DL (ref 65–99)
GLUCOSE SERPL-MCNC: 127 MG/DL (ref 65–99)
GLUCOSE SERPL-MCNC: 138 MG/DL (ref 65–99)
GLUCOSE SERPL-MCNC: 139 MG/DL (ref 65–99)
GLUCOSE SERPL-MCNC: 139 MG/DL (ref 65–99)
GLUCOSE SERPL-MCNC: 140 MG/DL (ref 65–99)
GLUCOSE SERPL-MCNC: 149 MG/DL (ref 65–99)
GLUCOSE SERPL-MCNC: 152 MG/DL (ref 65–99)
GLUCOSE SERPL-MCNC: 159 MG/DL (ref 65–99)
GLUCOSE SERPL-MCNC: 162 MG/DL (ref 65–99)
GLUCOSE SERPL-MCNC: 171 MG/DL (ref 65–99)
GLUCOSE SERPL-MCNC: 185 MG/DL (ref 65–99)
GLUCOSE SERPL-MCNC: 193 MG/DL (ref 65–99)
GLUCOSE SERPL-MCNC: 225 MG/DL (ref 65–99)
GLUCOSE SERPL-MCNC: 269 MG/DL (ref 65–99)
GLUCOSE SERPL-MCNC: 288 MG/DL (ref 65–99)
GLUCOSE SERPL-MCNC: 76 MG/DL (ref 65–99)
GLUCOSE SERPL-MCNC: 98 MG/DL (ref 65–99)
GLUCOSE SERPL-MCNC: 99 MG/DL (ref 65–99)
GRAM STN SPEC: ABNORMAL
GRAM STN SPEC: NORMAL
GRAM STN SPEC: NORMAL
HAPTOGLOB SERPL-MCNC: 99 MG/DL (ref 37–246)
HBV SURFACE AB SER-ACNC: 5.1 MIU/ML
HBV SURFACE AG SERPL QL IA: NONREACTIVE
HCO3 BLDA-SCNC: 30.5 MMOL/L (ref 22–26)
HCO3 BLDA-SCNC: 31.3 MMOL/L (ref 22–26)
HCT VFR BLD AUTO: 25.4 % (ref 36–46)
HCT VFR BLD AUTO: 26 % (ref 36–46)
HCT VFR BLD AUTO: 26.5 % (ref 36–46)
HCT VFR BLD AUTO: 26.8 % (ref 36–46)
HCT VFR BLD AUTO: 27 % (ref 36–46)
HCT VFR BLD AUTO: 27.7 % (ref 36–46)
HCT VFR BLD AUTO: 28.6 % (ref 36–46)
HCT VFR BLD AUTO: 29.2 % (ref 36–46)
HCT VFR BLD AUTO: 29.3 % (ref 36–46)
HCT VFR BLD AUTO: 29.5 % (ref 36–46)
HCT VFR BLD AUTO: 30 % (ref 36–46)
HCT VFR BLD AUTO: 30.3 % (ref 36–46)
HCT VFR BLD AUTO: 30.7 % (ref 36–46)
HCT VFR BLD AUTO: 31.9 % (ref 36–46)
HCT VFR BLD AUTO: 32.1 % (ref 36–46)
HCT VFR BLD AUTO: 33 % (ref 36–46)
HCT VFR BLD AUTO: 33.5 % (ref 36–46)
HCT VFR BLD AUTO: 33.5 % (ref 36–46)
HCT VFR BLD AUTO: 33.6 % (ref 36–46)
HCT VFR BLD AUTO: 34.8 % (ref 36–46)
HCT VFR BLD AUTO: 35.1 % (ref 36–46)
HCT VFR BLD AUTO: 35.3 % (ref 36–46)
HCT VFR BLD AUTO: 36.3 % (ref 36–46)
HCT VFR BLD AUTO: 43.8 % (ref 36–44)
HCT VFR BLD EST: 33 % (ref 36–46)
HCT VFR BLD EST: 37 % (ref 36–46)
HEMATOCRIT (%) IN BLOOD BY AUTOMATED COUNT: 27.3 % (ref 36–46)
HEMOGLOBIN (G/DL) IN BLOOD: 8.3 G/DL (ref 12–16)
HGB BLD-MCNC: 10 G/DL (ref 12–16)
HGB BLD-MCNC: 10 G/DL (ref 12–16)
HGB BLD-MCNC: 10.7 G/DL (ref 12–16)
HGB BLD-MCNC: 10.8 G/DL (ref 12–16)
HGB BLD-MCNC: 10.8 G/DL (ref 12–16)
HGB BLD-MCNC: 10.9 G/DL (ref 12–16)
HGB BLD-MCNC: 11.3 G/DL (ref 12–16)
HGB BLD-MCNC: 11.4 G/DL (ref 12–16)
HGB BLD-MCNC: 11.5 G/DL (ref 12–16)
HGB BLD-MCNC: 11.8 G/DL (ref 12–16)
HGB BLD-MCNC: 14.5 GM/DL (ref 12–15)
HGB BLD-MCNC: 8.4 G/DL (ref 12–16)
HGB BLD-MCNC: 8.9 G/DL (ref 12–16)
HGB BLD-MCNC: 9.2 G/DL (ref 12–16)
HGB BLD-MCNC: 9.3 G/DL (ref 12–16)
HGB BLD-MCNC: 9.4 G/DL (ref 12–16)
HGB BLD-MCNC: 9.5 G/DL (ref 12–16)
HGB BLD-MCNC: 9.5 G/DL (ref 12–16)
HGB BLD-MCNC: 9.6 G/DL (ref 12–16)
HGB BLD-MCNC: 9.7 G/DL (ref 12–16)
HGB BLD-MCNC: 9.9 G/DL (ref 12–16)
HGB BLD-MCNC: 9.9 G/DL (ref 12–16)
HGB BLDA-MCNC: 10.9 G/DL (ref 12–16)
HGB BLDA-MCNC: 12.2 G/DL (ref 12–16)
HGB RETIC QN: 29 PG (ref 28–38)
HOLD SPECIMEN: NORMAL
HS TROPONIN T DELTA: 14 (ref 0–4)
HS TROPONIN T DELTA: ABNORMAL (ref 0–4)
IMM GRANULOCYTES # BLD AUTO: 0.06 K/UL (ref 0–0.1)
IMM GRANULOCYTES # BLD AUTO: 0.06 X10*3/UL (ref 0–0.5)
IMM GRANULOCYTES # BLD AUTO: 0.06 X10*3/UL (ref 0–0.5)
IMM GRANULOCYTES # BLD AUTO: 0.08 X10*3/UL (ref 0–0.5)
IMM GRANULOCYTES # BLD AUTO: 0.08 X10*3/UL (ref 0–0.5)
IMM GRANULOCYTES # BLD AUTO: 0.09 X10*3/UL (ref 0–0.5)
IMM GRANULOCYTES # BLD AUTO: 0.1 X10*3/UL (ref 0–0.5)
IMM GRANULOCYTES # BLD AUTO: 0.1 X10*3/UL (ref 0–0.5)
IMM GRANULOCYTES # BLD AUTO: 0.11 X10*3/UL (ref 0–0.5)
IMM GRANULOCYTES # BLD AUTO: 0.12 X10*3/UL (ref 0–0.5)
IMM GRANULOCYTES # BLD AUTO: 0.14 X10*3/UL (ref 0–0.5)
IMM GRANULOCYTES # BLD AUTO: 0.15 X10*3/UL (ref 0–0.5)
IMM GRANULOCYTES # BLD AUTO: 0.21 X10*3/UL (ref 0–0.5)
IMM GRANULOCYTES # BLD AUTO: 0.23 X10*3/UL (ref 0–0.5)
IMM GRANULOCYTES # BLD AUTO: 0.3 X10*3/UL (ref 0–0.5)
IMM GRANULOCYTES NFR BLD AUTO: 0.5 % (ref 0–0.9)
IMM GRANULOCYTES NFR BLD AUTO: 0.7 % (ref 0–0.9)
IMM GRANULOCYTES NFR BLD AUTO: 0.8 % (ref 0–0.9)
IMM GRANULOCYTES NFR BLD AUTO: 0.9 % (ref 0–0.9)
IMM GRANULOCYTES NFR BLD AUTO: 1.1 % (ref 0–0.9)
IMM GRANULOCYTES NFR BLD AUTO: 1.3 % (ref 0–0.9)
IMM GRANULOCYTES NFR BLD AUTO: 1.3 % (ref 0–0.9)
IMM GRANULOCYTES NFR BLD AUTO: 1.8 % (ref 0–0.9)
IMM GRANULOCYTES NFR BLD AUTO: 1.9 % (ref 0–0.9)
IMM GRANULOCYTES NFR BLD AUTO: 2.1 % (ref 0–0.9)
IMMATURE GRANULOCYTES IN FLUID: 0 %
IMMATURE RETIC FRACTION: 14.8 %
INHALED O2 CONCENTRATION: 28 %
INHALED O2 CONCENTRATION: 36 %
LABORATORY COMMENT REPORT: NORMAL
LACTATE BLDA-SCNC: 1.8 MMOL/L (ref 0.4–2)
LACTATE BLDA-SCNC: 2.6 MMOL/L (ref 0.4–2)
LACTATE BLDV-SCNC: 2.1 MMOL/L (ref 0.4–2)
LACTATE BLDV-SCNC: 2.3 MMOL/L (ref 0.4–2)
LACTATE BLDV-SCNC: 3 MMOL/L (ref 0.4–2)
LACTATE BLDV-SCNC: 3.3 MMOL/L (ref 0.4–2)
LACTATE BLDV-SCNC: 3.6 MMOL/L (ref 0.4–2)
LDH FLD L TO P-CCNC: 34 U/L
LDH FLD L TO P-CCNC: 47 U/L
LDH SERPL-CCNC: 141 U/L (ref 91–227)
LYMPHOCYTES # BLD AUTO: 1.21 K/UL (ref 1.2–3.2)
LYMPHOCYTES # BLD AUTO: 1.39 X10*3/UL (ref 0.8–3)
LYMPHOCYTES # BLD AUTO: 1.69 X10*3/UL (ref 0.8–3)
LYMPHOCYTES # BLD AUTO: 2.03 X10*3/UL (ref 0.8–3)
LYMPHOCYTES # BLD AUTO: 2.33 X10*3/UL (ref 0.8–3)
LYMPHOCYTES # BLD AUTO: 2.44 X10*3/UL (ref 0.8–3)
LYMPHOCYTES # BLD AUTO: 2.51 X10*3/UL (ref 0.8–3)
LYMPHOCYTES # BLD AUTO: 2.6 X10*3/UL (ref 0.8–3)
LYMPHOCYTES # BLD AUTO: 2.71 X10*3/UL (ref 0.8–3)
LYMPHOCYTES # BLD AUTO: 2.75 X10*3/UL (ref 0.8–3)
LYMPHOCYTES # BLD AUTO: 2.75 X10*3/UL (ref 0.8–3)
LYMPHOCYTES # BLD AUTO: 2.76 X10*3/UL (ref 0.8–3)
LYMPHOCYTES # BLD AUTO: 2.99 X10*3/UL (ref 0.8–3)
LYMPHOCYTES # BLD AUTO: 3.03 X10*3/UL (ref 0.8–3)
LYMPHOCYTES # BLD AUTO: 3.16 X10*3/UL (ref 0.8–3)
LYMPHOCYTES NFR BLD AUTO: 16.6 %
LYMPHOCYTES NFR BLD AUTO: 18.3 %
LYMPHOCYTES NFR BLD AUTO: 18.5 %
LYMPHOCYTES NFR BLD AUTO: 18.5 %
LYMPHOCYTES NFR BLD AUTO: 18.7 %
LYMPHOCYTES NFR BLD AUTO: 20 %
LYMPHOCYTES NFR BLD AUTO: 20.6 %
LYMPHOCYTES NFR BLD AUTO: 21.1 %
LYMPHOCYTES NFR BLD AUTO: 21.6 %
LYMPHOCYTES NFR BLD AUTO: 21.9 %
LYMPHOCYTES NFR BLD AUTO: 23.3 %
LYMPHOCYTES NFR BLD AUTO: 25.4 %
LYMPHOCYTES NFR BLD AUTO: 26.2 %
LYMPHOCYTES NFR BLD AUTO: 26.7 %
LYMPHOCYTES NFR BLD MANUAL: 13.5 % (ref 20–40)
LYMPHOCYTES NFR FLD MANUAL: 69 %
MAGNESIUM SERPL-MCNC: 1.6 MG/DL (ref 1.6–3.1)
MAGNESIUM SERPL-MCNC: 1.9 MG/DL (ref 1.6–3.1)
MCH RBC QN AUTO: 28 PG (ref 26–34)
MCH RBC QN AUTO: 28.1 PG (ref 26–34)
MCH RBC QN AUTO: 28.2 PG (ref 26–34)
MCH RBC QN AUTO: 28.3 PG (ref 26–34)
MCH RBC QN AUTO: 28.3 PG (ref 26–34)
MCH RBC QN AUTO: 28.5 PG (ref 26–34)
MCH RBC QN AUTO: 28.6 PG (ref 26–34)
MCH RBC QN AUTO: 28.7 PG (ref 26–34)
MCH RBC QN AUTO: 28.8 PG (ref 26–34)
MCH RBC QN AUTO: 29.5 PG (ref 26–34)
MCH RBC QN AUTO: 29.7 PG (ref 26–34)
MCH RBC QN AUTO: 30.4 PG (ref 26–34)
MCH RBC QN AUTO: 32.4 PG (ref 26–34)
MCH RBC QN AUTO: 32.5 PG (ref 26–34)
MCHC RBC AUTO-ENTMCNC: 30.8 G/DL (ref 32–36)
MCHC RBC AUTO-ENTMCNC: 31.8 G/DL (ref 32–36)
MCHC RBC AUTO-ENTMCNC: 31.9 G/DL (ref 32–36)
MCHC RBC AUTO-ENTMCNC: 32 G/DL (ref 32–36)
MCHC RBC AUTO-ENTMCNC: 32.2 G/DL (ref 32–36)
MCHC RBC AUTO-ENTMCNC: 32.4 G/DL (ref 32–36)
MCHC RBC AUTO-ENTMCNC: 32.5 G/DL (ref 32–36)
MCHC RBC AUTO-ENTMCNC: 32.7 G/DL (ref 32–36)
MCHC RBC AUTO-ENTMCNC: 32.7 G/DL (ref 32–36)
MCHC RBC AUTO-ENTMCNC: 33 G/DL (ref 32–36)
MCHC RBC AUTO-ENTMCNC: 33.1 % (ref 31–37)
MCHC RBC AUTO-ENTMCNC: 33.1 G/DL (ref 32–36)
MCHC RBC AUTO-ENTMCNC: 33.3 G/DL (ref 32–36)
MCHC RBC AUTO-ENTMCNC: 33.5 G/DL (ref 32–36)
MCHC RBC AUTO-ENTMCNC: 33.6 G/DL (ref 32–36)
MCHC RBC AUTO-ENTMCNC: 33.6 G/DL (ref 32–36)
MCHC RBC AUTO-ENTMCNC: 33.9 G/DL (ref 32–36)
MCHC RBC AUTO-ENTMCNC: 33.9 G/DL (ref 32–36)
MCHC RBC AUTO-ENTMCNC: 34.2 G/DL (ref 32–36)
MCHC RBC AUTO-ENTMCNC: 34.2 G/DL (ref 32–36)
MCHC RBC AUTO-ENTMCNC: 34.3 G/DL (ref 32–36)
MCHC RBC AUTO-ENTMCNC: 34.4 G/DL (ref 32–36)
MCHC RBC AUTO-ENTMCNC: 35.5 G/DL (ref 32–36)
MCHC RBC AUTO-ENTMCNC: 35.8 G/DL (ref 32–36)
MCV RBC AUTO: 81 FL (ref 80–100)
MCV RBC AUTO: 83 FL (ref 80–100)
MCV RBC AUTO: 84 FL (ref 80–100)
MCV RBC AUTO: 85 FL (ref 80–100)
MCV RBC AUTO: 85 FL (ref 80–100)
MCV RBC AUTO: 86 FL (ref 80–100)
MCV RBC AUTO: 87 FL (ref 80–100)
MCV RBC AUTO: 88 FL (ref 80–100)
MCV RBC AUTO: 89 FL (ref 80–100)
MCV RBC AUTO: 89 FL (ref 80–100)
MCV RBC AUTO: 90 FL (ref 80–100)
MCV RBC AUTO: 91 FL (ref 80–100)
MCV RBC AUTO: 93 FL (ref 80–100)
MCV RBC AUTO: 97.8 FL (ref 80–100)
MONOCYTES # BLD AUTO: 0.19 X10*3/UL (ref 0.05–0.8)
MONOCYTES # BLD AUTO: 0.45 X10*3/UL (ref 0.05–0.8)
MONOCYTES # BLD AUTO: 0.6 K/UL (ref 0–0.8)
MONOCYTES # BLD AUTO: 0.64 X10*3/UL (ref 0.05–0.8)
MONOCYTES # BLD AUTO: 1.02 X10*3/UL (ref 0.05–0.8)
MONOCYTES # BLD AUTO: 1.08 X10*3/UL (ref 0.05–0.8)
MONOCYTES # BLD AUTO: 1.09 X10*3/UL (ref 0.05–0.8)
MONOCYTES # BLD AUTO: 1.15 X10*3/UL (ref 0.05–0.8)
MONOCYTES # BLD AUTO: 1.15 X10*3/UL (ref 0.05–0.8)
MONOCYTES # BLD AUTO: 1.19 X10*3/UL (ref 0.05–0.8)
MONOCYTES # BLD AUTO: 1.2 X10*3/UL (ref 0.05–0.8)
MONOCYTES # BLD AUTO: 1.22 X10*3/UL (ref 0.05–0.8)
MONOCYTES # BLD AUTO: 1.29 X10*3/UL (ref 0.05–0.8)
MONOCYTES # BLD AUTO: 1.38 X10*3/UL (ref 0.05–0.8)
MONOCYTES # BLD AUTO: 1.42 X10*3/UL (ref 0.05–0.8)
MONOCYTES NFR BLD AUTO: 10.1 %
MONOCYTES NFR BLD AUTO: 10.1 %
MONOCYTES NFR BLD AUTO: 11.1 %
MONOCYTES NFR BLD AUTO: 12.2 %
MONOCYTES NFR BLD AUTO: 2.2 %
MONOCYTES NFR BLD AUTO: 5.8 %
MONOCYTES NFR BLD AUTO: 6 %
MONOCYTES NFR BLD AUTO: 8 %
MONOCYTES NFR BLD AUTO: 8.6 %
MONOCYTES NFR BLD AUTO: 8.7 %
MONOCYTES NFR BLD AUTO: 8.8 %
MONOCYTES NFR BLD AUTO: 8.9 %
MONOCYTES NFR BLD AUTO: 9.1 %
MONOCYTES NFR BLD AUTO: 9.7 %
MONOCYTES NFR BLD MANUAL: 6.7 % (ref 0–8)
MONOS+MACROS NFR FLD MANUAL: 12 %
NEUTROPHILS # BLD AUTO: 5.6 X10*3/UL (ref 1.6–5.5)
NEUTROPHILS # BLD AUTO: 5.73 X10*3/UL (ref 1.6–5.5)
NEUTROPHILS # BLD AUTO: 6.19 X10*3/UL (ref 1.6–5.5)
NEUTROPHILS # BLD AUTO: 6.41 X10*3/UL (ref 1.6–5.5)
NEUTROPHILS # BLD AUTO: 6.76 K/UL
NEUTROPHILS # BLD AUTO: 6.76 K/UL (ref 1.8–7.7)
NEUTROPHILS # BLD AUTO: 7.17 X10*3/UL (ref 1.6–5.5)
NEUTROPHILS # BLD AUTO: 7.2 X10*3/UL (ref 1.6–5.5)
NEUTROPHILS # BLD AUTO: 7.21 X10*3/UL (ref 1.6–5.5)
NEUTROPHILS # BLD AUTO: 7.57 X10*3/UL (ref 1.6–5.5)
NEUTROPHILS # BLD AUTO: 7.86 X10*3/UL (ref 1.6–5.5)
NEUTROPHILS # BLD AUTO: 8.02 X10*3/UL (ref 1.6–5.5)
NEUTROPHILS # BLD AUTO: 8.87 X10*3/UL (ref 1.6–5.5)
NEUTROPHILS # BLD AUTO: 9.11 X10*3/UL (ref 1.6–5.5)
NEUTROPHILS # BLD AUTO: 9.33 X10*3/UL (ref 1.6–5.5)
NEUTROPHILS # BLD AUTO: 9.63 X10*3/UL (ref 1.6–5.5)
NEUTROPHILS NFR BLD AUTO: 54.6 %
NEUTROPHILS NFR BLD AUTO: 55.6 %
NEUTROPHILS NFR BLD AUTO: 58 %
NEUTROPHILS NFR BLD AUTO: 60.3 %
NEUTROPHILS NFR BLD AUTO: 60.9 %
NEUTROPHILS NFR BLD AUTO: 62.5 %
NEUTROPHILS NFR BLD AUTO: 63.2 %
NEUTROPHILS NFR BLD AUTO: 64.2 %
NEUTROPHILS NFR BLD AUTO: 65.9 %
NEUTROPHILS NFR BLD AUTO: 66.2 %
NEUTROPHILS NFR BLD AUTO: 68.2 %
NEUTROPHILS NFR BLD AUTO: 68.7 %
NEUTROPHILS NFR BLD AUTO: 74.5 %
NEUTROPHILS NFR BLD AUTO: 75.9 %
NEUTROPHILS NFR FLD MANUAL: 19 %
NEUTROPHILS.IMMATURE NFR BLD: 0.7 % (ref 0–1)
NEUTS SEG NFR BLD: 75.5 % (ref 50–70)
NRBC BLD-RTO: 0 /100 WBC
NRBC BLD-RTO: 0 /100 WBCS (ref 0–0)
NRBC BLD-RTO: 0.1 /100 WBCS (ref 0–0)
NRBC BLD-RTO: 0.2 /100 WBCS (ref 0–0)
NRBC BLD-RTO: 0.2 /100 WBCS (ref 0–0)
NRBC BLD-RTO: 0.3 /100 WBCS (ref 0–0)
NRBC BLD-RTO: 0.4 /100 WBCS (ref 0–0)
NRBC BLD-RTO: 0.4 /100 WBCS (ref 0–0)
OTHER CELLS NFR FLD MANUAL: 0 %
OVALOCYTES BLD QL SMEAR: NORMAL
OXYHGB MFR BLDA: 96.2 % (ref 94–98)
OXYHGB MFR BLDA: 97.3 % (ref 94–98)
P AXIS: 72 DEGREES
P OFFSET: 170 MS
P ONSET: 126 MS
PAPPENHEIMER BOD BLD QL SMEAR: PRESENT
PAPPENHEIMER BOD BLD QL SMEAR: PRESENT
PATH REPORT.FINAL DX SPEC: NORMAL
PATH REPORT.FINAL DX SPEC: NORMAL
PATH REPORT.GROSS SPEC: NORMAL
PATH REPORT.GROSS SPEC: NORMAL
PATH REPORT.RELEVANT HX SPEC: NORMAL
PATH REPORT.RELEVANT HX SPEC: NORMAL
PATH REPORT.TOTAL CANCER: NORMAL
PATH REPORT.TOTAL CANCER: NORMAL
PCO2 BLDA: 40 MM HG (ref 38–42)
PCO2 BLDA: 42 MM HG (ref 38–42)
PH BLDA: 7.48 PH (ref 7.38–7.42)
PH BLDA: 7.49 PH (ref 7.38–7.42)
PHOSPHATE SERPL-MCNC: 0.5 MG/DL (ref 2.5–4.5)
PHOSPHATE SERPL-MCNC: 0.9 MG/DL (ref 2.5–4.5)
PHOSPHATE SERPL-MCNC: 0.9 MG/DL (ref 2.5–4.5)
PHOSPHATE SERPL-MCNC: 1 MG/DL (ref 2.5–4.5)
PHOSPHATE SERPL-MCNC: 1 MG/DL (ref 2.5–4.5)
PHOSPHATE SERPL-MCNC: 1.2 MG/DL (ref 2.5–4.5)
PHOSPHATE SERPL-MCNC: 1.4 MG/DL (ref 2.5–4.5)
PHOSPHATE SERPL-MCNC: 1.7 MG/DL (ref 2.5–4.5)
PHOSPHATE SERPL-MCNC: 1.7 MG/DL (ref 2.5–4.5)
PHOSPHATE SERPL-MCNC: 1.9 MG/DL (ref 2.5–4.5)
PHOSPHATE SERPL-MCNC: 2.4 MG/DL (ref 2.5–4.5)
PHOSPHATE SERPL-MCNC: 2.8 MG/DL (ref 2.5–4.5)
PHOSPHATE SERPL-MCNC: 2.9 MG/DL (ref 2.5–4.5)
PHOSPHATE SERPL-MCNC: 3 MG/DL (ref 2.5–4.5)
PHOSPHATE SERPL-MCNC: 3 MG/DL (ref 2.5–4.5)
PLASMA CELLS NFR FLD MANUAL: 0 %
PLATELET # BLD AUTO: 100 X10*3/UL (ref 150–450)
PLATELET # BLD AUTO: 110 X10*3/UL (ref 150–450)
PLATELET # BLD AUTO: 113 X10*3/UL (ref 150–450)
PLATELET # BLD AUTO: 114 X10*3/UL (ref 150–450)
PLATELET # BLD AUTO: 116 X10*3/UL (ref 150–450)
PLATELET # BLD AUTO: 120 X10*3/UL (ref 150–450)
PLATELET # BLD AUTO: 121 X10*3/UL (ref 150–450)
PLATELET # BLD AUTO: 141 X10*3/UL (ref 150–450)
PLATELET # BLD AUTO: 158 X10*3/UL (ref 150–450)
PLATELET # BLD AUTO: 160 X10*3/UL (ref 150–450)
PLATELET # BLD AUTO: 160 X10*3/UL (ref 150–450)
PLATELET # BLD AUTO: 172 X10*3/UL (ref 150–450)
PLATELET # BLD AUTO: 175 X10*3/UL (ref 150–450)
PLATELET # BLD AUTO: 183 X10*3/UL (ref 150–450)
PLATELET # BLD AUTO: 195 X10*3/UL (ref 150–450)
PLATELET # BLD AUTO: 201 X10*3/UL (ref 150–450)
PLATELET # BLD AUTO: 215 X10*3/UL (ref 150–450)
PLATELET # BLD AUTO: 218 X10*3/UL (ref 150–450)
PLATELET # BLD AUTO: 221 X10*3/UL (ref 150–450)
PLATELET # BLD AUTO: 226 X10*3/UL (ref 150–450)
PLATELET # BLD AUTO: 229 X10*3/UL (ref 150–450)
PLATELET # BLD AUTO: 231 X10*3/UL (ref 150–450)
PLATELET # BLD AUTO: 240 K/UL (ref 150–450)
PLATELET # BLD AUTO: 312 X10*3/UL (ref 150–450)
PLATELET # BLD AUTO: 79 X10*3/UL (ref 150–450)
PLATELET # BLD AUTO: 97 X10*3/UL (ref 150–450)
PLATELET CLUMP BLD QL SMEAR: PRESENT
PMV BLD AUTO: 9.8 CU (ref 7–12.6)
PO2 BLDA: 103 MM HG (ref 85–95)
PO2 BLDA: 83 MM HG (ref 85–95)
POLYCHROMASIA BLD QL SMEAR: NORMAL
POTASSIUM BLDA-SCNC: 3.2 MMOL/L (ref 3.5–5.3)
POTASSIUM BLDA-SCNC: 3.6 MMOL/L (ref 3.5–5.3)
POTASSIUM SERPL-SCNC: 3 MMOL/L (ref 3.4–5.1)
POTASSIUM SERPL-SCNC: 3.1 MMOL/L (ref 3.4–5.1)
POTASSIUM SERPL-SCNC: 3.2 MMOL/L (ref 3.4–5.1)
POTASSIUM SERPL-SCNC: 3.3 MMOL/L (ref 3.4–5.1)
POTASSIUM SERPL-SCNC: 3.6 MMOL/L (ref 3.4–5.1)
POTASSIUM SERPL-SCNC: 3.6 MMOL/L (ref 3.4–5.1)
POTASSIUM SERPL-SCNC: 3.7 MMOL/L (ref 3.4–5.1)
POTASSIUM SERPL-SCNC: 3.8 MMOL/L (ref 3.4–5.1)
POTASSIUM SERPL-SCNC: 3.9 MMOL/L (ref 3.4–5.1)
POTASSIUM SERPL-SCNC: 4 MMOL/L (ref 3.4–5.1)
POTASSIUM SERPL-SCNC: 4 MMOL/L (ref 3.4–5.1)
POTASSIUM SERPL-SCNC: 4.1 MMOL/L (ref 3.4–5.1)
POTASSIUM SERPL-SCNC: 4.2 MMOL/L (ref 3.4–5.1)
POTASSIUM SERPL-SCNC: 5 MMOL/L (ref 3.4–5.1)
PR INTERVAL: 206 MS
PREALB SERPL-MCNC: 3.2 MG/DL (ref 18–40)
PROCALCITONIN SERPL-MCNC: 1.33 NG/ML
PROT FLD-MCNC: 1.5 G/DL
PROT SERPL-MCNC: 5.5 G/DL (ref 5.9–7.9)
PROT SERPL-MCNC: 5.7 G/DL (ref 5.9–7.9)
PROT SERPL-MCNC: 6.3 G/DL (ref 5.9–7.9)
PROT SERPL-MCNC: 7.9 G/DL (ref 5.9–7.9)
Q ONSET: 229 MS
QRS COUNT: 16 BEATS
QRS DURATION: 138 MS
QT INTERVAL: 488 MS
QTC CALCULATION(BAZETT): 629 MS
QTC FREDERICIA: 579 MS
R AXIS: 137 DEGREES
RBC # BLD AUTO: 2.92 X10*6/UL (ref 4–5.2)
RBC # BLD AUTO: 2.92 X10*6/UL (ref 4–5.2)
RBC # BLD AUTO: 2.93 X10*6/UL (ref 4–5.2)
RBC # BLD AUTO: 3.16 X10*6/UL (ref 4–5.2)
RBC # BLD AUTO: 3.19 X10*6/UL (ref 4–5.2)
RBC # BLD AUTO: 3.23 X10*6/UL (ref 4–5.2)
RBC # BLD AUTO: 3.29 X10*6/UL (ref 4–5.2)
RBC # BLD AUTO: 3.37 X10*6/UL (ref 4–5.2)
RBC # BLD AUTO: 3.41 X10*6/UL (ref 4–5.2)
RBC # BLD AUTO: 3.51 X10*6/UL (ref 4–5.2)
RBC # BLD AUTO: 3.52 X10*6/UL (ref 4–5.2)
RBC # BLD AUTO: 3.54 X10*6/UL (ref 4–5.2)
RBC # BLD AUTO: 3.54 X10*6/UL (ref 4–5.2)
RBC # BLD AUTO: 3.72 X10*6/UL (ref 4–5.2)
RBC # BLD AUTO: 3.75 X10*6/UL (ref 4–5.2)
RBC # BLD AUTO: 3.78 X10*6/UL (ref 4–5.2)
RBC # BLD AUTO: 3.79 X10*6/UL (ref 4–5.2)
RBC # BLD AUTO: 3.79 X10*6/UL (ref 4–5.2)
RBC # BLD AUTO: 3.86 X10*6/UL (ref 4–5.2)
RBC # BLD AUTO: 3.97 X10*6/UL (ref 4–5.2)
RBC # BLD AUTO: 4 X10*6/UL (ref 4–5.2)
RBC # BLD AUTO: 4.04 X10*6/UL (ref 4–5.2)
RBC # BLD AUTO: 4.13 X10*6/UL (ref 4–5.2)
RBC # BLD AUTO: 4.48 M/UL (ref 4–4.9)
RBC # FLD AUTO: 198 /UL
RBC MORPH BLD: NORMAL
RETICS #: 0.09 X10*6/UL (ref 0.02–0.11)
RETICS/RBC NFR AUTO: 4.5 % (ref 0.5–2)
RSV RNA RESP QL NAA+PROBE: NOT DETECTED
SAO2 % BLDA: 100 % (ref 94–100)
SAO2 % BLDA: 99 % (ref 94–100)
SARS-COV-2 RNA RESP QL NAA+PROBE: DETECTED
SODIUM BLDA-SCNC: 131 MMOL/L (ref 136–145)
SODIUM BLDA-SCNC: 133 MMOL/L (ref 136–145)
SODIUM SERPL-SCNC: 130 MMOL/L (ref 133–145)
SODIUM SERPL-SCNC: 130 MMOL/L (ref 133–145)
SODIUM SERPL-SCNC: 131 MMOL/L (ref 133–145)
SODIUM SERPL-SCNC: 132 MMOL/L (ref 133–145)
SODIUM SERPL-SCNC: 134 MMOL/L (ref 133–145)
SODIUM SERPL-SCNC: 135 MMOL/L (ref 133–145)
SODIUM SERPL-SCNC: 136 MMOL/L (ref 133–145)
SODIUM SERPL-SCNC: 138 MMOL/L (ref 133–145)
SODIUM SERPL-SCNC: 139 MMOL/L (ref 133–145)
SODIUM SERPL-SCNC: 139 MMOL/L (ref 133–145)
SPECIMEN DRAWN FROM PATIENT: ABNORMAL
SPECIMEN DRAWN FROM PATIENT: ABNORMAL
SPHEROCYTES BLD QL SMEAR: NORMAL
STAPHYLOCOCCUS SPEC CULT: NORMAL
T AXIS: 107 DEGREES
T OFFSET: 473 MS
TARGETS BLD QL SMEAR: NORMAL
TOTAL CELLS COUNTED FLD: 100
TROPONIN T SERPL-MCNC: 113 NG/L
TROPONIN T SERPL-MCNC: 127 NG/L
TROPONIN T SERPL-MCNC: 205 NG/L
TROPONIN T SERPL-MCNC: 213 NG/L
VANCOMYCIN SERPL-MCNC: 22.8 UG/ML (ref 10–20)
VANCOMYCIN SERPL-MCNC: 29.7 UG/ML (ref 10–20)
VENTRICULAR RATE: 100 BPM
WBC # BLD AUTO: 10.3 X10*3/UL (ref 4.4–11.3)
WBC # BLD AUTO: 10.4 X10*3/UL (ref 4.4–11.3)
WBC # BLD AUTO: 10.5 X10*3/UL (ref 4.4–11.3)
WBC # BLD AUTO: 10.6 X10*3/UL (ref 4.4–11.3)
WBC # BLD AUTO: 11.1 X10*3/UL (ref 4.4–11.3)
WBC # BLD AUTO: 11.3 X10*3/UL (ref 4.4–11.3)
WBC # BLD AUTO: 11.3 X10*3/UL (ref 4.4–11.3)
WBC # BLD AUTO: 11.7 X10*3/UL (ref 4.4–11.3)
WBC # BLD AUTO: 11.8 X10*3/UL (ref 4.4–11.3)
WBC # BLD AUTO: 11.9 X10*3/UL (ref 4.4–11.3)
WBC # BLD AUTO: 12 X10*3/UL (ref 4.4–11.3)
WBC # BLD AUTO: 12.3 X10*3/UL (ref 4.4–11.3)
WBC # BLD AUTO: 12.4 X10*3/UL (ref 4.4–11.3)
WBC # BLD AUTO: 12.6 X10*3/UL (ref 4.4–11.3)
WBC # BLD AUTO: 12.7 X10*3/UL (ref 4.4–11.3)
WBC # BLD AUTO: 12.7 X10*3/UL (ref 4.4–11.3)
WBC # BLD AUTO: 13.4 X10*3/UL (ref 4.4–11.3)
WBC # BLD AUTO: 14 X10*3/UL (ref 4.4–11.3)
WBC # BLD AUTO: 14.1 X10*3/UL (ref 4.4–11.3)
WBC # BLD AUTO: 14.2 X10*3/UL (ref 4.4–11.3)
WBC # BLD AUTO: 17.9 X10*3/UL (ref 4.4–11.3)
WBC # BLD AUTO: 7.5 X10*3/UL (ref 4.4–11.3)
WBC # BLD AUTO: 8.5 X10*3/UL (ref 4.4–11.3)
WBC # BLD AUTO: 9 K/UL (ref 4.5–11)
WBC # BLD AUTO: 9 X10*3/UL (ref 4.4–11.3)
WBC # BLD AUTO: 9.5 X10*3/UL (ref 4.4–11.3)
WBC # FLD AUTO: 137 /UL

## 2023-01-01 PROCEDURE — 94760 N-INVAS EAR/PLS OXIMETRY 1: CPT

## 2023-01-01 PROCEDURE — 2500000004 HC RX 250 GENERAL PHARMACY W/ HCPCS (ALT 636 FOR OP/ED)

## 2023-01-01 PROCEDURE — 2500000001 HC RX 250 WO HCPCS SELF ADMINISTERED DRUGS (ALT 637 FOR MEDICARE OP): Performed by: INTERNAL MEDICINE

## 2023-01-01 PROCEDURE — 2500000004 HC RX 250 GENERAL PHARMACY W/ HCPCS (ALT 636 FOR OP/ED): Performed by: INTERNAL MEDICINE

## 2023-01-01 PROCEDURE — 2500000001 HC RX 250 WO HCPCS SELF ADMINISTERED DRUGS (ALT 637 FOR MEDICARE OP): Performed by: NURSE PRACTITIONER

## 2023-01-01 PROCEDURE — 2500000005 HC RX 250 GENERAL PHARMACY W/O HCPCS: Performed by: HOSPITALIST

## 2023-01-01 PROCEDURE — 87205 SMEAR GRAM STAIN: CPT | Mod: WESLAB | Performed by: RADIOLOGY

## 2023-01-01 PROCEDURE — 80048 BASIC METABOLIC PNL TOTAL CA: CPT | Performed by: NURSE PRACTITIONER

## 2023-01-01 PROCEDURE — 82947 ASSAY GLUCOSE BLOOD QUANT: CPT

## 2023-01-01 PROCEDURE — 85025 COMPLETE CBC W/AUTO DIFF WBC: CPT | Performed by: INTERNAL MEDICINE

## 2023-01-01 PROCEDURE — 92526 ORAL FUNCTION THERAPY: CPT | Mod: GN | Performed by: SPEECH-LANGUAGE PATHOLOGIST

## 2023-01-01 PROCEDURE — 99232 SBSQ HOSP IP/OBS MODERATE 35: CPT | Performed by: INTERNAL MEDICINE

## 2023-01-01 PROCEDURE — 87040 BLOOD CULTURE FOR BACTERIA: CPT | Mod: WESLAB | Performed by: NURSE PRACTITIONER

## 2023-01-01 PROCEDURE — 85027 COMPLETE CBC AUTOMATED: CPT | Performed by: NURSE PRACTITIONER

## 2023-01-01 PROCEDURE — 37799 UNLISTED PX VASCULAR SURGERY: CPT | Performed by: INTERNAL MEDICINE

## 2023-01-01 PROCEDURE — 32555 ASPIRATE PLEURA W/ IMAGING: CPT | Mod: LT | Performed by: RADIOLOGY

## 2023-01-01 PROCEDURE — 2020000001 HC ICU ROOM DAILY

## 2023-01-01 PROCEDURE — 84100 ASSAY OF PHOSPHORUS: CPT | Performed by: INTERNAL MEDICINE

## 2023-01-01 PROCEDURE — 5A1D70Z PERFORMANCE OF URINARY FILTRATION, INTERMITTENT, LESS THAN 6 HOURS PER DAY: ICD-10-PCS | Performed by: INTERNAL MEDICINE

## 2023-01-01 PROCEDURE — 71045 X-RAY EXAM CHEST 1 VIEW: CPT

## 2023-01-01 PROCEDURE — 9420000001 HC RT PATIENT EDUCATION 5 MIN

## 2023-01-01 PROCEDURE — 2500000004 HC RX 250 GENERAL PHARMACY W/ HCPCS (ALT 636 FOR OP/ED): Mod: JZ | Performed by: INTERNAL MEDICINE

## 2023-01-01 PROCEDURE — 99306 1ST NF CARE HIGH MDM 50: CPT | Performed by: INTERNAL MEDICINE

## 2023-01-01 PROCEDURE — 80069 RENAL FUNCTION PANEL: CPT | Performed by: INTERNAL MEDICINE

## 2023-01-01 PROCEDURE — 6350000001 HC RX 635 EPOETIN >10,000 UNITS: Mod: JZ | Performed by: NURSE PRACTITIONER

## 2023-01-01 PROCEDURE — 88305 TISSUE EXAM BY PATHOLOGIST: CPT | Performed by: PATHOLOGY

## 2023-01-01 PROCEDURE — 2500000005 HC RX 250 GENERAL PHARMACY W/O HCPCS: Performed by: NURSE PRACTITIONER

## 2023-01-01 PROCEDURE — 97535 SELF CARE MNGMENT TRAINING: CPT | Mod: GO,CO

## 2023-01-01 PROCEDURE — 85045 AUTOMATED RETICULOCYTE COUNT: CPT | Performed by: INTERNAL MEDICINE

## 2023-01-01 PROCEDURE — 83615 LACTATE (LD) (LDH) ENZYME: CPT | Performed by: INTERNAL MEDICINE

## 2023-01-01 PROCEDURE — 2500000004 HC RX 250 GENERAL PHARMACY W/ HCPCS (ALT 636 FOR OP/ED): Performed by: STUDENT IN AN ORGANIZED HEALTH CARE EDUCATION/TRAINING PROGRAM

## 2023-01-01 PROCEDURE — G0299 HHS/HOSPICE OF RN EA 15 MIN: HCPCS

## 2023-01-01 PROCEDURE — 87205 SMEAR GRAM STAIN: CPT | Mod: WESLAB | Performed by: INTERNAL MEDICINE

## 2023-01-01 PROCEDURE — 88112 CYTOPATH CELL ENHANCE TECH: CPT | Performed by: PATHOLOGY

## 2023-01-01 PROCEDURE — 83615 LACTATE (LD) (LDH) ENZYME: CPT | Mod: WESLAB | Performed by: RADIOLOGY

## 2023-01-01 PROCEDURE — 99285 EMERGENCY DEPT VISIT HI MDM: CPT | Performed by: EMERGENCY MEDICINE

## 2023-01-01 PROCEDURE — 87040 BLOOD CULTURE FOR BACTERIA: CPT | Mod: WESLAB | Performed by: HOSPITALIST

## 2023-01-01 PROCEDURE — 2060000001 HC INTERMEDIATE ICU ROOM DAILY

## 2023-01-01 PROCEDURE — G0180 MD CERTIFICATION HHA PATIENT: HCPCS | Performed by: INTERNAL MEDICINE

## 2023-01-01 PROCEDURE — 6350000001 HC RX 635 EPOETIN >10,000 UNITS: Mod: JZ | Performed by: INTERNAL MEDICINE

## 2023-01-01 PROCEDURE — 94640 AIRWAY INHALATION TREATMENT: CPT

## 2023-01-01 PROCEDURE — 99233 SBSQ HOSP IP/OBS HIGH 50: CPT | Performed by: NURSE PRACTITIONER

## 2023-01-01 PROCEDURE — 84145 PROCALCITONIN (PCT): CPT | Mod: WESLAB | Performed by: INTERNAL MEDICINE

## 2023-01-01 PROCEDURE — 2500000004 HC RX 250 GENERAL PHARMACY W/ HCPCS (ALT 636 FOR OP/ED): Performed by: NURSE PRACTITIONER

## 2023-01-01 PROCEDURE — 2500000002 HC RX 250 W HCPCS SELF ADMINISTERED DRUGS (ALT 637 FOR MEDICARE OP, ALT 636 FOR OP/ED): Performed by: INTERNAL MEDICINE

## 2023-01-01 PROCEDURE — 82040 ASSAY OF SERUM ALBUMIN: CPT | Performed by: NURSE PRACTITIONER

## 2023-01-01 PROCEDURE — 8010000001 HC DIALYSIS - HEMODIALYSIS PER DAY: Performed by: INTERNAL MEDICINE

## 2023-01-01 PROCEDURE — P9047 ALBUMIN (HUMAN), 25%, 50ML: HCPCS | Mod: JZ | Performed by: INTERNAL MEDICINE

## 2023-01-01 PROCEDURE — 2500000005 HC RX 250 GENERAL PHARMACY W/O HCPCS: Performed by: STUDENT IN AN ORGANIZED HEALTH CARE EDUCATION/TRAINING PROGRAM

## 2023-01-01 PROCEDURE — 2500000002 HC RX 250 W HCPCS SELF ADMINISTERED DRUGS (ALT 637 FOR MEDICARE OP, ALT 636 FOR OP/ED): Performed by: NURSE PRACTITIONER

## 2023-01-01 PROCEDURE — 83735 ASSAY OF MAGNESIUM: CPT | Performed by: INTERNAL MEDICINE

## 2023-01-01 PROCEDURE — 88112 CYTOPATH CELL ENHANCE TECH: CPT | Mod: TC | Performed by: INTERNAL MEDICINE

## 2023-01-01 PROCEDURE — 2500000005 HC RX 250 GENERAL PHARMACY W/O HCPCS: Performed by: INTERNAL MEDICINE

## 2023-01-01 PROCEDURE — 3E043XZ INTRODUCTION OF VASOPRESSOR INTO CENTRAL VEIN, PERCUTANEOUS APPROACH: ICD-10-PCS | Performed by: HOSPITALIST

## 2023-01-01 PROCEDURE — 2500000004 HC RX 250 GENERAL PHARMACY W/ HCPCS (ALT 636 FOR OP/ED): Mod: JZ | Performed by: HOSPITALIST

## 2023-01-01 PROCEDURE — 97162 PT EVAL MOD COMPLEX 30 MIN: CPT | Mod: GP

## 2023-01-01 PROCEDURE — 71045 X-RAY EXAM CHEST 1 VIEW: CPT | Mod: FY

## 2023-01-01 PROCEDURE — 82533 TOTAL CORTISOL: CPT | Mod: WESLAB | Performed by: INTERNAL MEDICINE

## 2023-01-01 PROCEDURE — 8010000001 HC DIALYSIS - HEMODIALYSIS PER DAY

## 2023-01-01 PROCEDURE — 36415 COLL VENOUS BLD VENIPUNCTURE: CPT | Performed by: NURSE PRACTITIONER

## 2023-01-01 PROCEDURE — 85025 COMPLETE CBC W/AUTO DIFF WBC: CPT | Performed by: EMERGENCY MEDICINE

## 2023-01-01 PROCEDURE — 82374 ASSAY BLOOD CARBON DIOXIDE: CPT | Performed by: NURSE PRACTITIONER

## 2023-01-01 PROCEDURE — 37799 UNLISTED PX VASCULAR SURGERY: CPT | Performed by: HOSPITALIST

## 2023-01-01 PROCEDURE — 92610 EVALUATE SWALLOWING FUNCTION: CPT | Mod: GN | Performed by: SPEECH-LANGUAGE PATHOLOGIST

## 2023-01-01 PROCEDURE — 83605 ASSAY OF LACTIC ACID: CPT | Performed by: NURSE PRACTITIONER

## 2023-01-01 PROCEDURE — 87340 HEPATITIS B SURFACE AG IA: CPT | Mod: WESLAB | Performed by: INTERNAL MEDICINE

## 2023-01-01 PROCEDURE — 83605 ASSAY OF LACTIC ACID: CPT | Performed by: HOSPITALIST

## 2023-01-01 PROCEDURE — 86140 C-REACTIVE PROTEIN: CPT | Performed by: INTERNAL MEDICINE

## 2023-01-01 PROCEDURE — 84157 ASSAY OF PROTEIN OTHER: CPT | Mod: WESLAB | Performed by: INTERNAL MEDICINE

## 2023-01-01 PROCEDURE — 36415 COLL VENOUS BLD VENIPUNCTURE: CPT | Performed by: INTERNAL MEDICINE

## 2023-01-01 PROCEDURE — G0152 HHCP-SERV OF OT,EA 15 MIN: HCPCS

## 2023-01-01 PROCEDURE — 93010 ELECTROCARDIOGRAM REPORT: CPT | Performed by: INTERNAL MEDICINE

## 2023-01-01 PROCEDURE — P9045 ALBUMIN (HUMAN), 5%, 250 ML: HCPCS | Mod: JZ | Performed by: HOSPITALIST

## 2023-01-01 PROCEDURE — 84484 ASSAY OF TROPONIN QUANT: CPT | Performed by: EMERGENCY MEDICINE

## 2023-01-01 PROCEDURE — 85027 COMPLETE CBC AUTOMATED: CPT | Performed by: HOSPITALIST

## 2023-01-01 PROCEDURE — 2500000001 HC RX 250 WO HCPCS SELF ADMINISTERED DRUGS (ALT 637 FOR MEDICARE OP): Performed by: STUDENT IN AN ORGANIZED HEALTH CARE EDUCATION/TRAINING PROGRAM

## 2023-01-01 PROCEDURE — 97164 PT RE-EVAL EST PLAN CARE: CPT | Mod: GP

## 2023-01-01 PROCEDURE — 82533 TOTAL CORTISOL: CPT | Mod: WESLAB | Performed by: NURSE PRACTITIONER

## 2023-01-01 PROCEDURE — 37799 UNLISTED PX VASCULAR SURGERY: CPT | Performed by: NURSE PRACTITIONER

## 2023-01-01 PROCEDURE — 93005 ELECTROCARDIOGRAM TRACING: CPT

## 2023-01-01 PROCEDURE — 82435 ASSAY OF BLOOD CHLORIDE: CPT | Performed by: INTERNAL MEDICINE

## 2023-01-01 PROCEDURE — 80053 COMPREHEN METABOLIC PANEL: CPT | Performed by: EMERGENCY MEDICINE

## 2023-01-01 PROCEDURE — 70450 CT HEAD/BRAIN W/O DYE: CPT

## 2023-01-01 PROCEDURE — 87634 RSV DNA/RNA AMP PROBE: CPT | Performed by: HOSPITALIST

## 2023-01-01 PROCEDURE — 99308 SBSQ NF CARE LOW MDM 20: CPT | Performed by: INTERNAL MEDICINE

## 2023-01-01 PROCEDURE — 2720000007 HC OR 272 NO HCPCS

## 2023-01-01 PROCEDURE — 80053 COMPREHEN METABOLIC PANEL: CPT | Performed by: NURSE PRACTITIONER

## 2023-01-01 PROCEDURE — 88112 CYTOPATH CELL ENHANCE TECH: CPT | Mod: TC | Performed by: RADIOLOGY

## 2023-01-01 PROCEDURE — 84132 ASSAY OF SERUM POTASSIUM: CPT | Performed by: NURSE PRACTITIONER

## 2023-01-01 PROCEDURE — 83735 ASSAY OF MAGNESIUM: CPT | Performed by: NURSE PRACTITIONER

## 2023-01-01 PROCEDURE — 82945 GLUCOSE OTHER FLUID: CPT | Mod: WESLAB | Performed by: INTERNAL MEDICINE

## 2023-01-01 PROCEDURE — 99223 1ST HOSP IP/OBS HIGH 75: CPT | Performed by: NURSE PRACTITIONER

## 2023-01-01 PROCEDURE — 83010 ASSAY OF HAPTOGLOBIN QUANT: CPT | Performed by: INTERNAL MEDICINE

## 2023-01-01 PROCEDURE — 99221 1ST HOSP IP/OBS SF/LOW 40: CPT | Performed by: INTERNAL MEDICINE

## 2023-01-01 PROCEDURE — 2500000005 HC RX 250 GENERAL PHARMACY W/O HCPCS: Performed by: RADIOLOGY

## 2023-01-01 PROCEDURE — 84134 ASSAY OF PREALBUMIN: CPT | Mod: WESLAB | Performed by: NURSE PRACTITIONER

## 2023-01-01 PROCEDURE — 80202 ASSAY OF VANCOMYCIN: CPT | Performed by: INTERNAL MEDICINE

## 2023-01-01 PROCEDURE — 80048 BASIC METABOLIC PNL TOTAL CA: CPT | Performed by: STUDENT IN AN ORGANIZED HEALTH CARE EDUCATION/TRAINING PROGRAM

## 2023-01-01 PROCEDURE — 86706 HEP B SURFACE ANTIBODY: CPT | Mod: WESLAB | Performed by: INTERNAL MEDICINE

## 2023-01-01 PROCEDURE — 87324 CLOSTRIDIUM AG IA: CPT | Mod: WESLAB | Performed by: NURSE PRACTITIONER

## 2023-01-01 PROCEDURE — 36600 WITHDRAWAL OF ARTERIAL BLOOD: CPT

## 2023-01-01 PROCEDURE — 32555 ASPIRATE PLEURA W/ IMAGING: CPT

## 2023-01-01 PROCEDURE — 0W9B3ZZ DRAINAGE OF LEFT PLEURAL CAVITY, PERCUTANEOUS APPROACH: ICD-10-PCS | Performed by: STUDENT IN AN ORGANIZED HEALTH CARE EDUCATION/TRAINING PROGRAM

## 2023-01-01 PROCEDURE — G0151 HHCP-SERV OF PT,EA 15 MIN: HCPCS

## 2023-01-01 PROCEDURE — 87075 CULTR BACTERIA EXCEPT BLOOD: CPT | Mod: WESLAB | Performed by: RADIOLOGY

## 2023-01-01 PROCEDURE — 80069 RENAL FUNCTION PANEL: CPT | Performed by: NURSE PRACTITIONER

## 2023-01-01 PROCEDURE — 2500000004 HC RX 250 GENERAL PHARMACY W/ HCPCS (ALT 636 FOR OP/ED): Performed by: EMERGENCY MEDICINE

## 2023-01-01 PROCEDURE — 36556 INSERT NON-TUNNEL CV CATH: CPT | Performed by: INTERNAL MEDICINE

## 2023-01-01 PROCEDURE — C1729 CATH, DRAINAGE: HCPCS

## 2023-01-01 PROCEDURE — 87493 C DIFF AMPLIFIED PROBE: CPT | Performed by: NURSE PRACTITIONER

## 2023-01-01 PROCEDURE — 02H633Z INSERTION OF INFUSION DEVICE INTO RIGHT ATRIUM, PERCUTANEOUS APPROACH: ICD-10-PCS | Performed by: HOSPITALIST

## 2023-01-01 PROCEDURE — 0023 HH SOC

## 2023-01-01 PROCEDURE — 99222 1ST HOSP IP/OBS MODERATE 55: CPT | Performed by: NURSE PRACTITIONER

## 2023-01-01 PROCEDURE — G0300 HHS/HOSPICE OF LPN EA 15 MIN: HCPCS

## 2023-01-01 PROCEDURE — 85027 COMPLETE CBC AUTOMATED: CPT | Performed by: STUDENT IN AN ORGANIZED HEALTH CARE EDUCATION/TRAINING PROGRAM

## 2023-01-01 PROCEDURE — 82248 BILIRUBIN DIRECT: CPT | Performed by: EMERGENCY MEDICINE

## 2023-01-01 PROCEDURE — 80202 ASSAY OF VANCOMYCIN: CPT

## 2023-01-01 PROCEDURE — 97530 THERAPEUTIC ACTIVITIES: CPT | Mod: GP

## 2023-01-01 PROCEDURE — 2500000004 HC RX 250 GENERAL PHARMACY W/ HCPCS (ALT 636 FOR OP/ED): Mod: JZ | Performed by: NURSE PRACTITIONER

## 2023-01-01 PROCEDURE — 97168 OT RE-EVAL EST PLAN CARE: CPT | Mod: GO

## 2023-01-01 PROCEDURE — 87081 CULTURE SCREEN ONLY: CPT | Mod: WESLAB | Performed by: NURSE PRACTITIONER

## 2023-01-01 PROCEDURE — 83605 ASSAY OF LACTIC ACID: CPT | Performed by: EMERGENCY MEDICINE

## 2023-01-01 PROCEDURE — 3E0333Z INTRODUCTION OF ANTI-INFLAMMATORY INTO PERIPHERAL VEIN, PERCUTANEOUS APPROACH: ICD-10-PCS | Performed by: INTERNAL MEDICINE

## 2023-01-01 PROCEDURE — 36415 COLL VENOUS BLD VENIPUNCTURE: CPT | Performed by: EMERGENCY MEDICINE

## 2023-01-01 PROCEDURE — 89051 BODY FLUID CELL COUNT: CPT | Performed by: RADIOLOGY

## 2023-01-01 PROCEDURE — P9047 ALBUMIN (HUMAN), 25%, 50ML: HCPCS | Mod: JZ | Performed by: NURSE PRACTITIONER

## 2023-01-01 PROCEDURE — 80053 COMPREHEN METABOLIC PANEL: CPT | Performed by: INTERNAL MEDICINE

## 2023-01-01 PROCEDURE — 92610 EVALUATE SWALLOWING FUNCTION: CPT | Mod: GN

## 2023-01-01 PROCEDURE — 73630 X-RAY EXAM OF FOOT: CPT | Mod: 50,FY

## 2023-01-01 PROCEDURE — 93922 UPR/L XTREMITY ART 2 LEVELS: CPT | Performed by: INTERNAL MEDICINE

## 2023-01-01 PROCEDURE — 76942 ECHO GUIDE FOR BIOPSY: CPT

## 2023-01-01 PROCEDURE — 83615 LACTATE (LD) (LDH) ENZYME: CPT | Mod: WESLAB | Performed by: INTERNAL MEDICINE

## 2023-01-01 RX ORDER — POLYETHYLENE GLYCOL 3350 17 G/17G
17 POWDER, FOR SOLUTION ORAL DAILY
Status: DISCONTINUED | OUTPATIENT
Start: 2023-01-01 | End: 2023-01-01

## 2023-01-01 RX ORDER — BLOOD-GLUCOSE TRANSMITTER
EACH MISCELLANEOUS
Qty: 1 EACH | Refills: 0 | Status: SHIPPED | OUTPATIENT
Start: 2023-01-01 | End: 2023-01-01 | Stop reason: ENTERED-IN-ERROR

## 2023-01-01 RX ORDER — SIMVASTATIN 10 MG/1
TABLET, FILM COATED ORAL
COMMUNITY
End: 2023-01-01 | Stop reason: ENTERED-IN-ERROR

## 2023-01-01 RX ORDER — DEXTROSE 50 % IN WATER (D50W) INTRAVENOUS SYRINGE
25
Status: DISCONTINUED | OUTPATIENT
Start: 2023-01-01 | End: 2023-01-01 | Stop reason: SDUPTHER

## 2023-01-01 RX ORDER — HEPARIN SODIUM 1000 [USP'U]/ML
2000 INJECTION, SOLUTION INTRAVENOUS; SUBCUTANEOUS
Status: DISCONTINUED | OUTPATIENT
Start: 2023-01-01 | End: 2023-01-01

## 2023-01-01 RX ORDER — FLUCONAZOLE 50 MG/1
1 TABLET ORAL DAILY
COMMUNITY
Start: 2023-02-23 | End: 2023-01-01 | Stop reason: ENTERED-IN-ERROR

## 2023-01-01 RX ORDER — DOXYCYCLINE 100 MG/1
100 CAPSULE ORAL DAILY
Status: DISCONTINUED | OUTPATIENT
Start: 2023-01-01 | End: 2024-01-01 | Stop reason: HOSPADM

## 2023-01-01 RX ORDER — SIMVASTATIN 20 MG/1
20 TABLET, FILM COATED ORAL DAILY
Qty: 100 TABLET | Refills: 2 | Status: CANCELLED | OUTPATIENT
Start: 2023-01-01

## 2023-01-01 RX ORDER — HEPARIN SODIUM 1000 [USP'U]/ML
2000 INJECTION, SOLUTION INTRAVENOUS; SUBCUTANEOUS
Status: CANCELLED | OUTPATIENT
Start: 2023-01-01

## 2023-01-01 RX ORDER — COSYNTROPIN 0.25 MG/ML
250 INJECTION, POWDER, FOR SOLUTION INTRAMUSCULAR; INTRAVENOUS ONCE
Status: COMPLETED | OUTPATIENT
Start: 2023-01-01 | End: 2023-01-01

## 2023-01-01 RX ORDER — HEPARIN SODIUM 1000 [USP'U]/ML
2000 INJECTION, SOLUTION INTRAVENOUS; SUBCUTANEOUS
Status: COMPLETED | OUTPATIENT
Start: 2023-01-01 | End: 2023-01-01

## 2023-01-01 RX ORDER — MIDODRINE HYDROCHLORIDE 10 MG/1
TABLET ORAL
COMMUNITY
End: 2024-01-01 | Stop reason: HOSPADM

## 2023-01-01 RX ORDER — NAPROXEN 250 MG/1
TABLET ORAL
COMMUNITY
End: 2023-01-01 | Stop reason: ALTCHOICE

## 2023-01-01 RX ORDER — GABAPENTIN 300 MG/1
300 CAPSULE ORAL 2 TIMES DAILY
Status: DISCONTINUED | OUTPATIENT
Start: 2023-01-01 | End: 2023-01-01

## 2023-01-01 RX ORDER — VANCOMYCIN HYDROCHLORIDE 250 MG/1
500 CAPSULE ORAL 4 TIMES DAILY
Status: DISCONTINUED | OUTPATIENT
Start: 2023-01-01 | End: 2024-01-01 | Stop reason: HOSPADM

## 2023-01-01 RX ORDER — NYSTATIN 100000 [USP'U]/G
1 POWDER TOPICAL 2 TIMES DAILY
Qty: 60 G | Refills: 3 | Status: SHIPPED | OUTPATIENT
Start: 2023-01-01 | End: 2023-01-01 | Stop reason: ENTERED-IN-ERROR

## 2023-01-01 RX ORDER — LANCETS
EACH MISCELLANEOUS
COMMUNITY
End: 2023-01-01 | Stop reason: ENTERED-IN-ERROR

## 2023-01-01 RX ORDER — METOPROLOL SUCCINATE 25 MG/1
0.5 TABLET, EXTENDED RELEASE ORAL DAILY
COMMUNITY
End: 2024-01-01 | Stop reason: HOSPADM

## 2023-01-01 RX ORDER — DEXTROSE MONOHYDRATE 100 MG/ML
0.3 INJECTION, SOLUTION INTRAVENOUS ONCE AS NEEDED
Status: DISCONTINUED | OUTPATIENT
Start: 2023-01-01 | End: 2024-01-01 | Stop reason: HOSPADM

## 2023-01-01 RX ORDER — CALCIUM ACETATE 667 MG/1
CAPSULE ORAL
COMMUNITY
Start: 2023-03-06 | End: 2024-01-01 | Stop reason: HOSPADM

## 2023-01-01 RX ORDER — ESCITALOPRAM OXALATE 10 MG/1
10 TABLET ORAL DAILY
Status: DISCONTINUED | OUTPATIENT
Start: 2023-01-01 | End: 2024-01-01 | Stop reason: HOSPADM

## 2023-01-01 RX ORDER — NYSTATIN 100000 [USP'U]/G
POWDER TOPICAL
COMMUNITY
End: 2023-01-01 | Stop reason: ENTERED-IN-ERROR

## 2023-01-01 RX ORDER — MIDODRINE HYDROCHLORIDE 10 MG/1
10 TABLET ORAL
Status: DISCONTINUED | OUTPATIENT
Start: 2023-01-01 | End: 2023-01-01

## 2023-01-01 RX ORDER — FUROSEMIDE 40 MG/1
TABLET ORAL
COMMUNITY
End: 2023-01-01 | Stop reason: ENTERED-IN-ERROR

## 2023-01-01 RX ORDER — DEXTROSE 50 % IN WATER (D50W) INTRAVENOUS SYRINGE
25
Status: DISCONTINUED | OUTPATIENT
Start: 2023-01-01 | End: 2024-01-01 | Stop reason: HOSPADM

## 2023-01-01 RX ORDER — ALLOPURINOL 300 MG/1
300 TABLET ORAL DAILY
COMMUNITY
End: 2023-01-01 | Stop reason: ENTERED-IN-ERROR

## 2023-01-01 RX ORDER — VANCOMYCIN HYDROCHLORIDE 125 MG/1
125 CAPSULE ORAL 4 TIMES DAILY
Status: DISCONTINUED | OUTPATIENT
Start: 2023-01-01 | End: 2023-01-01

## 2023-01-01 RX ORDER — METFORMIN HYDROCHLORIDE 1000 MG/1
TABLET ORAL
COMMUNITY
End: 2023-01-01 | Stop reason: ENTERED-IN-ERROR

## 2023-01-01 RX ORDER — HEPARIN SODIUM 1000 [USP'U]/ML
2000 INJECTION, SOLUTION INTRAVENOUS; SUBCUTANEOUS
Status: DISCONTINUED | OUTPATIENT
Start: 2023-01-01 | End: 2024-01-01 | Stop reason: HOSPADM

## 2023-01-01 RX ORDER — BLOOD-GLUCOSE SENSOR
EACH MISCELLANEOUS
Qty: 6 EACH | Refills: 3 | Status: SHIPPED | OUTPATIENT
Start: 2023-01-01 | End: 2024-05-28

## 2023-01-01 RX ORDER — ALBUMIN HUMAN 50 G/1000ML
25 SOLUTION INTRAVENOUS EVERY 8 HOURS
Status: DISPENSED | OUTPATIENT
Start: 2023-01-01 | End: 2023-01-01

## 2023-01-01 RX ORDER — FEBUXOSTAT 40 MG/1
40 TABLET, FILM COATED ORAL EVERY OTHER DAY
Status: DISCONTINUED | OUTPATIENT
Start: 2023-01-01 | End: 2024-01-01 | Stop reason: HOSPADM

## 2023-01-01 RX ORDER — GLYBURIDE 5 MG/1
TABLET ORAL
COMMUNITY
End: 2023-01-01 | Stop reason: ENTERED-IN-ERROR

## 2023-01-01 RX ORDER — POLYETHYLENE GLYCOL 3350 17 G/17G
17 POWDER, FOR SOLUTION ORAL DAILY PRN
Status: DISCONTINUED | OUTPATIENT
Start: 2023-01-01 | End: 2024-01-01 | Stop reason: HOSPADM

## 2023-01-01 RX ORDER — NYSTATIN 100000 [USP'U]/G
1 POWDER TOPICAL 2 TIMES DAILY
Qty: 30 G | Refills: 11 | Status: SHIPPED | OUTPATIENT
Start: 2023-01-01 | End: 2023-01-01 | Stop reason: ENTERED-IN-ERROR

## 2023-01-01 RX ORDER — LIDOCAINE HYDROCHLORIDE 20 MG/ML
INJECTION, SOLUTION EPIDURAL; INFILTRATION; INTRACAUDAL; PERINEURAL
Status: COMPLETED | OUTPATIENT
Start: 2023-01-01 | End: 2023-01-01

## 2023-01-01 RX ORDER — ERYTHROPOIETIN 10000 [IU]/ML
INJECTION, SOLUTION INTRAVENOUS; SUBCUTANEOUS
COMMUNITY
End: 2023-01-01 | Stop reason: ENTERED-IN-ERROR

## 2023-01-01 RX ORDER — FUROSEMIDE 10 MG/ML
INJECTION INTRAMUSCULAR; INTRAVENOUS
COMMUNITY
End: 2023-01-01 | Stop reason: ENTERED-IN-ERROR

## 2023-01-01 RX ORDER — ZINC OXIDE 20 G/100G
1 OINTMENT TOPICAL
Status: DISCONTINUED | OUTPATIENT
Start: 2023-01-01 | End: 2024-01-01 | Stop reason: HOSPADM

## 2023-01-01 RX ORDER — FEBUXOSTAT 40 MG/1
TABLET, FILM COATED ORAL
COMMUNITY
Start: 2022-10-09 | End: 2024-01-01 | Stop reason: HOSPADM

## 2023-01-01 RX ORDER — GABAPENTIN 100 MG/1
100 CAPSULE ORAL 2 TIMES DAILY
Status: DISCONTINUED | OUTPATIENT
Start: 2023-01-01 | End: 2024-01-01 | Stop reason: HOSPADM

## 2023-01-01 RX ORDER — FENOFIBRATE 160 MG/1
160 TABLET ORAL DAILY
Status: DISCONTINUED | OUTPATIENT
Start: 2023-01-01 | End: 2024-01-01 | Stop reason: HOSPADM

## 2023-01-01 RX ORDER — HYDROCODONE BITARTRATE AND ACETAMINOPHEN 5; 325 MG/1; MG/1
1 TABLET ORAL EVERY 6 HOURS PRN
Status: DISCONTINUED | OUTPATIENT
Start: 2023-01-01 | End: 2024-01-01 | Stop reason: HOSPADM

## 2023-01-01 RX ORDER — BLOOD-GLUCOSE,RECEIVER,CONT
EACH MISCELLANEOUS
Qty: 1 EACH | Refills: 0 | Status: SHIPPED | OUTPATIENT
Start: 2023-01-01 | End: 2023-01-01 | Stop reason: ENTERED-IN-ERROR

## 2023-01-01 RX ORDER — DEXAMETHASONE SODIUM PHOSPHATE 100 MG/10ML
6 INJECTION INTRAMUSCULAR; INTRAVENOUS
Status: DISCONTINUED | OUTPATIENT
Start: 2023-01-01 | End: 2023-01-01

## 2023-01-01 RX ORDER — SIMVASTATIN 20 MG/1
40 TABLET, FILM COATED ORAL NIGHTLY
COMMUNITY
Start: 2013-11-18 | End: 2024-01-01

## 2023-01-01 RX ORDER — VANCOMYCIN 1 G/200ML
1 INJECTION, SOLUTION INTRAVENOUS ONCE
Status: COMPLETED | OUTPATIENT
Start: 2023-01-01 | End: 2023-01-01

## 2023-01-01 RX ORDER — INSULIN LISPRO 100 [IU]/ML
0-5 INJECTION, SOLUTION INTRAVENOUS; SUBCUTANEOUS
Status: DISCONTINUED | OUTPATIENT
Start: 2023-01-01 | End: 2023-01-01

## 2023-01-01 RX ORDER — INSULIN GLARGINE 100 [IU]/ML
INJECTION, SOLUTION SUBCUTANEOUS
COMMUNITY
End: 2023-01-01 | Stop reason: ENTERED-IN-ERROR

## 2023-01-01 RX ORDER — NOREPINEPHRINE BITARTRATE/D5W 8 MG/250ML
.01-3 PLASTIC BAG, INJECTION (ML) INTRAVENOUS CONTINUOUS
Status: DISCONTINUED | OUTPATIENT
Start: 2023-01-01 | End: 2024-01-01

## 2023-01-01 RX ORDER — POTASSIUM CHLORIDE 14.9 MG/ML
20 INJECTION INTRAVENOUS ONCE
Status: COMPLETED | OUTPATIENT
Start: 2023-01-01 | End: 2023-01-01

## 2023-01-01 RX ORDER — FOLIC ACID/VIT B COMPLEX AND C 0.8 MG
TABLET ORAL
COMMUNITY
End: 2024-01-01 | Stop reason: HOSPADM

## 2023-01-01 RX ORDER — LOPERAMIDE HCL 2 MG
TABLET ORAL
COMMUNITY
End: 2023-01-01 | Stop reason: ENTERED-IN-ERROR

## 2023-01-01 RX ORDER — POTASSIUM CHLORIDE 750 MG/1
10 TABLET, FILM COATED, EXTENDED RELEASE ORAL ONCE
Status: COMPLETED | OUTPATIENT
Start: 2023-01-01 | End: 2023-01-01

## 2023-01-01 RX ORDER — MIDODRINE HYDROCHLORIDE 5 MG/1
15 TABLET ORAL
Status: DISCONTINUED | OUTPATIENT
Start: 2023-01-01 | End: 2024-01-01 | Stop reason: HOSPADM

## 2023-01-01 RX ORDER — METOPROLOL SUCCINATE 25 MG/1
12.5 TABLET, EXTENDED RELEASE ORAL DAILY
Status: DISCONTINUED | OUTPATIENT
Start: 2023-01-01 | End: 2024-01-01 | Stop reason: HOSPADM

## 2023-01-01 RX ORDER — LOVASTATIN 10 MG/1
TABLET ORAL
COMMUNITY
End: 2023-01-01 | Stop reason: ENTERED-IN-ERROR

## 2023-01-01 RX ORDER — ATORVASTATIN CALCIUM 40 MG/1
TABLET, FILM COATED ORAL
COMMUNITY
End: 2023-01-01 | Stop reason: ENTERED-IN-ERROR

## 2023-01-01 RX ORDER — ALBUMIN HUMAN 250 G/1000ML
25 SOLUTION INTRAVENOUS ONCE
Status: COMPLETED | OUTPATIENT
Start: 2023-01-01 | End: 2023-01-01

## 2023-01-01 RX ORDER — HEPARIN SODIUM 1000 [USP'U]/ML
2000 INJECTION, SOLUTION INTRAVENOUS; SUBCUTANEOUS
Status: DISCONTINUED | OUTPATIENT
Start: 2023-01-01 | End: 2023-01-01 | Stop reason: SDUPTHER

## 2023-01-01 RX ORDER — INSULIN LISPRO 100 [IU]/ML
INJECTION, SOLUTION INTRAVENOUS; SUBCUTANEOUS
COMMUNITY
End: 2023-01-01 | Stop reason: ENTERED-IN-ERROR

## 2023-01-01 RX ORDER — LEVOFLOXACIN 500 MG/1
TABLET, FILM COATED ORAL
COMMUNITY
End: 2023-01-01 | Stop reason: ENTERED-IN-ERROR

## 2023-01-01 RX ORDER — ALLOPURINOL 100 MG/1
TABLET ORAL
COMMUNITY
End: 2023-01-01 | Stop reason: ENTERED-IN-ERROR

## 2023-01-01 RX ORDER — LINAGLIPTIN 5 MG/1
TABLET, FILM COATED ORAL
COMMUNITY
Start: 2022-05-26 | End: 2024-01-01 | Stop reason: HOSPADM

## 2023-01-01 RX ORDER — NOREPINEPHRINE BITARTRATE/D5W 8 MG/250ML
.01-3 PLASTIC BAG, INJECTION (ML) INTRAVENOUS CONTINUOUS
Status: DISCONTINUED | OUTPATIENT
Start: 2023-01-01 | End: 2023-01-01

## 2023-01-01 RX ORDER — METFORMIN HYDROCHLORIDE 500 MG/1
TABLET ORAL
COMMUNITY
End: 2023-01-01 | Stop reason: ENTERED-IN-ERROR

## 2023-01-01 RX ORDER — MIDODRINE HYDROCHLORIDE 5 MG/1
TABLET ORAL
COMMUNITY
End: 2023-01-01 | Stop reason: ALTCHOICE

## 2023-01-01 RX ORDER — HYDROCORTISONE 1 %
CREAM (GRAM) TOPICAL
COMMUNITY
End: 2023-01-01 | Stop reason: ENTERED-IN-ERROR

## 2023-01-01 RX ORDER — ESCITALOPRAM OXALATE 10 MG/1
TABLET ORAL
COMMUNITY
End: 2024-01-01 | Stop reason: HOSPADM

## 2023-01-01 RX ORDER — L. ACIDOPHILUS/L.BULGARICUS 1MM CELL
TABLET ORAL
COMMUNITY
End: 2023-01-01 | Stop reason: ENTERED-IN-ERROR

## 2023-01-01 RX ORDER — CALCIUM ACETATE 667 MG/1
2000 CAPSULE ORAL
Status: DISCONTINUED | OUTPATIENT
Start: 2023-01-01 | End: 2023-01-01

## 2023-01-01 RX ORDER — ANASTROZOLE 1 MG/1
TABLET ORAL
COMMUNITY
End: 2023-01-01 | Stop reason: ENTERED-IN-ERROR

## 2023-01-01 RX ORDER — ERGOCALCIFEROL 1.25 MG/1
CAPSULE ORAL
COMMUNITY
End: 2023-01-01 | Stop reason: ENTERED-IN-ERROR

## 2023-01-01 RX ORDER — IPRATROPIUM BROMIDE AND ALBUTEROL SULFATE 2.5; .5 MG/3ML; MG/3ML
3 SOLUTION RESPIRATORY (INHALATION)
Status: DISCONTINUED | OUTPATIENT
Start: 2023-01-01 | End: 2023-01-01

## 2023-01-01 RX ORDER — IPRATROPIUM BROMIDE AND ALBUTEROL SULFATE 2.5; .5 MG/3ML; MG/3ML
3 SOLUTION RESPIRATORY (INHALATION)
Status: DISCONTINUED | OUTPATIENT
Start: 2023-01-01 | End: 2024-01-01

## 2023-01-01 RX ORDER — HEPARIN SODIUM 1000 [USP'U]/ML
2000 INJECTION, SOLUTION INTRAVENOUS; SUBCUTANEOUS
Status: DISPENSED | OUTPATIENT
Start: 2023-01-01 | End: 2023-01-01

## 2023-01-01 RX ORDER — INSULIN LISPRO 100 [IU]/ML
0-10 INJECTION, SOLUTION INTRAVENOUS; SUBCUTANEOUS
Status: DISCONTINUED | OUTPATIENT
Start: 2023-01-01 | End: 2024-01-01 | Stop reason: HOSPADM

## 2023-01-01 RX ORDER — GABAPENTIN 300 MG/1
300 CAPSULE ORAL 2 TIMES DAILY
COMMUNITY
Start: 2023-01-01 | End: 2024-01-01 | Stop reason: HOSPADM

## 2023-01-01 RX ORDER — DEXTROSE MONOHYDRATE 100 MG/ML
0.3 INJECTION, SOLUTION INTRAVENOUS ONCE AS NEEDED
Status: DISCONTINUED | OUTPATIENT
Start: 2023-01-01 | End: 2023-01-01 | Stop reason: SDUPTHER

## 2023-01-01 RX ORDER — ALBUMIN HUMAN 250 G/1000ML
12.5 SOLUTION INTRAVENOUS
Status: COMPLETED | OUTPATIENT
Start: 2023-01-01 | End: 2023-01-01

## 2023-01-01 RX ORDER — GLUCOSAM/CHONDRO/HERB 149/HYAL 750-100 MG
TABLET ORAL
COMMUNITY
End: 2023-01-01 | Stop reason: ENTERED-IN-ERROR

## 2023-01-01 RX ORDER — VANCOMYCIN 1.25 G/250ML
1750 INJECTION, SOLUTION INTRAVENOUS
Status: COMPLETED | OUTPATIENT
Start: 2023-01-01 | End: 2023-01-01

## 2023-01-01 RX ORDER — B-COMPLEX WITH VITAMIN C
1 TABLET ORAL DAILY
Status: DISCONTINUED | OUTPATIENT
Start: 2023-01-01 | End: 2023-01-01 | Stop reason: RX

## 2023-01-01 RX ORDER — ESCITALOPRAM OXALATE 20 MG/1
TABLET ORAL
COMMUNITY
End: 2023-01-01 | Stop reason: ENTERED-IN-ERROR

## 2023-01-01 RX ORDER — LIDOCAINE HYDROCHLORIDE 10 MG/ML
INJECTION, SOLUTION EPIDURAL; INFILTRATION; INTRACAUDAL; PERINEURAL
Status: COMPLETED | OUTPATIENT
Start: 2023-01-01 | End: 2023-01-01

## 2023-01-01 RX ORDER — GABAPENTIN 100 MG/1
CAPSULE ORAL
COMMUNITY
Start: 2020-12-29 | Stop reason: WASHOUT

## 2023-01-01 RX ORDER — CHOLESTYRAMINE 4 G/9G
4 POWDER, FOR SUSPENSION ORAL
Status: DISCONTINUED | OUTPATIENT
Start: 2023-01-01 | End: 2024-01-01

## 2023-01-01 RX ORDER — ACETAMINOPHEN 325 MG/1
650 TABLET ORAL EVERY 4 HOURS PRN
Status: DISCONTINUED | OUTPATIENT
Start: 2023-01-01 | End: 2024-01-01 | Stop reason: HOSPADM

## 2023-01-01 RX ORDER — LISINOPRIL AND HYDROCHLOROTHIAZIDE 12.5; 2 MG/1; MG/1
TABLET ORAL
COMMUNITY
End: 2023-01-01 | Stop reason: ENTERED-IN-ERROR

## 2023-01-01 RX ORDER — VANCOMYCIN HYDROCHLORIDE 1 G/20ML
INJECTION, POWDER, LYOPHILIZED, FOR SOLUTION INTRAVENOUS DAILY PRN
Status: DISCONTINUED | OUTPATIENT
Start: 2023-01-01 | End: 2023-01-01

## 2023-01-01 RX ORDER — SIMVASTATIN 40 MG/1
40 TABLET, FILM COATED ORAL NIGHTLY
Status: DISCONTINUED | OUTPATIENT
Start: 2023-01-01 | End: 2024-01-01 | Stop reason: HOSPADM

## 2023-01-01 RX ORDER — OMEGA-3-ACID ETHYL ESTERS 1 G/1
CAPSULE, LIQUID FILLED ORAL
COMMUNITY
End: 2023-01-01 | Stop reason: ENTERED-IN-ERROR

## 2023-01-01 RX ORDER — CALCIUM ACETATE 667 MG/1
667 CAPSULE ORAL
Status: DISCONTINUED | OUTPATIENT
Start: 2023-01-01 | End: 2023-01-01

## 2023-01-01 RX ORDER — LISINOPRIL 20 MG/1
TABLET ORAL
COMMUNITY
End: 2023-01-01 | Stop reason: ENTERED-IN-ERROR

## 2023-01-01 RX ORDER — POTASSIUM CHLORIDE 20 MEQ/1
20 TABLET, EXTENDED RELEASE ORAL ONCE
Status: COMPLETED | OUTPATIENT
Start: 2023-01-01 | End: 2023-01-01

## 2023-01-01 RX ORDER — DOXYCYCLINE 100 MG/1
100 CAPSULE ORAL
Status: DISCONTINUED | OUTPATIENT
Start: 2023-01-01 | End: 2023-01-01

## 2023-01-01 RX ORDER — HYDROCORTISONE VALERATE CREAM 2 MG/G
CREAM TOPICAL
COMMUNITY
Start: 2023-03-14 | End: 2023-01-01 | Stop reason: ENTERED-IN-ERROR

## 2023-01-01 RX ORDER — NAPROXEN SODIUM 220 MG/1
81 TABLET, FILM COATED ORAL DAILY
COMMUNITY
End: 2023-01-01 | Stop reason: ENTERED-IN-ERROR

## 2023-01-01 RX ORDER — INSULIN GLARGINE 100 [IU]/ML
6 INJECTION, SOLUTION SUBCUTANEOUS EVERY 24 HOURS
Status: DISCONTINUED | OUTPATIENT
Start: 2023-01-01 | End: 2024-01-01 | Stop reason: HOSPADM

## 2023-01-01 RX ORDER — POTASSIUM CHLORIDE 1.5 G/1.58G
20 POWDER, FOR SOLUTION ORAL ONCE
Status: DISCONTINUED | OUTPATIENT
Start: 2023-01-01 | End: 2023-01-01

## 2023-01-01 RX ORDER — NYSTATIN 100000 [USP'U]/G
1 POWDER TOPICAL 2 TIMES DAILY
Status: DISCONTINUED | OUTPATIENT
Start: 2023-01-01 | End: 2024-01-01 | Stop reason: HOSPADM

## 2023-01-01 RX ADMIN — HYDROCODONE BITARTRATE AND ACETAMINOPHEN 1 TABLET: 5; 325 TABLET ORAL at 01:03

## 2023-01-01 RX ADMIN — MIDODRINE HYDROCHLORIDE 15 MG: 5 TABLET ORAL at 16:18

## 2023-01-01 RX ADMIN — FEBUXOSTAT 40 MG: 40 TABLET ORAL at 08:01

## 2023-01-01 RX ADMIN — FEBUXOSTAT 40 MG: 40 TABLET ORAL at 08:53

## 2023-01-01 RX ADMIN — PIPERACILLIN SODIUM AND TAZOBACTAM SODIUM 2.25 G: 2; .25 INJECTION, SOLUTION INTRAVENOUS at 20:20

## 2023-01-01 RX ADMIN — GABAPENTIN 100 MG: 100 CAPSULE ORAL at 20:27

## 2023-01-01 RX ADMIN — DOXYCYCLINE HYCLATE 100 MG: 100 CAPSULE ORAL at 12:22

## 2023-01-01 RX ADMIN — APIXABAN 2.5 MG: 2.5 TABLET, FILM COATED ORAL at 21:07

## 2023-01-01 RX ADMIN — CALCIUM ACETATE 667 MG: 667 CAPSULE ORAL at 11:59

## 2023-01-01 RX ADMIN — INSULIN LISPRO 4 UNITS: 100 INJECTION, SOLUTION INTRAVENOUS; SUBCUTANEOUS at 12:51

## 2023-01-01 RX ADMIN — MIDODRINE HYDROCHLORIDE 15 MG: 5 TABLET ORAL at 07:56

## 2023-01-01 RX ADMIN — DOXYCYCLINE HYCLATE 100 MG: 100 CAPSULE ORAL at 09:50

## 2023-01-01 RX ADMIN — FENOFIBRATE 160 MG: 160 TABLET ORAL at 09:00

## 2023-01-01 RX ADMIN — DOXYCYCLINE HYCLATE 100 MG: 100 CAPSULE ORAL at 08:27

## 2023-01-01 RX ADMIN — VANCOMYCIN HYDROCHLORIDE 125 MG: 125 CAPSULE ORAL at 16:27

## 2023-01-01 RX ADMIN — PIPERACILLIN SODIUM AND TAZOBACTAM SODIUM 2.25 G: 2; .25 INJECTION, SOLUTION INTRAVENOUS at 08:24

## 2023-01-01 RX ADMIN — APIXABAN 2.5 MG: 2.5 TABLET, FILM COATED ORAL at 20:49

## 2023-01-01 RX ADMIN — VANCOMYCIN HYDROCHLORIDE 125 MG: 125 CAPSULE ORAL at 13:01

## 2023-01-01 RX ADMIN — GABAPENTIN 100 MG: 100 CAPSULE ORAL at 21:38

## 2023-01-01 RX ADMIN — APIXABAN 2.5 MG: 2.5 TABLET, FILM COATED ORAL at 08:51

## 2023-01-01 RX ADMIN — HEPARIN SODIUM 1600 UNITS: 1000 INJECTION INTRAVENOUS; SUBCUTANEOUS at 12:07

## 2023-01-01 RX ADMIN — APIXABAN 2.5 MG: 2.5 TABLET, FILM COATED ORAL at 08:57

## 2023-01-01 RX ADMIN — CHOLESTYRAMINE 4 G: 4 POWDER, FOR SUSPENSION ORAL at 17:00

## 2023-01-01 RX ADMIN — Medication: at 20:19

## 2023-01-01 RX ADMIN — FENOFIBRATE 160 MG: 160 TABLET ORAL at 08:27

## 2023-01-01 RX ADMIN — DOXYCYCLINE HYCLATE 100 MG: 100 CAPSULE ORAL at 09:22

## 2023-01-01 RX ADMIN — SIMVASTATIN 40 MG: 40 TABLET, FILM COATED ORAL at 20:22

## 2023-01-01 RX ADMIN — FIDAXOMICIN 200 MG: 200 TABLET, FILM COATED ORAL at 11:55

## 2023-01-01 RX ADMIN — VANCOMYCIN HYDROCHLORIDE 125 MG: 125 CAPSULE ORAL at 13:35

## 2023-01-01 RX ADMIN — IPRATROPIUM BROMIDE AND ALBUTEROL SULFATE 3 ML: 2.5; .5 SOLUTION RESPIRATORY (INHALATION) at 19:52

## 2023-01-01 RX ADMIN — SIMVASTATIN 40 MG: 40 TABLET, FILM COATED ORAL at 20:51

## 2023-01-01 RX ADMIN — APIXABAN 2.5 MG: 2.5 TABLET, FILM COATED ORAL at 09:43

## 2023-01-01 RX ADMIN — FIDAXOMICIN 200 MG: 200 TABLET, FILM COATED ORAL at 20:53

## 2023-01-01 RX ADMIN — SODIUM CHLORIDE 500 ML: 9 INJECTION, SOLUTION INTRAVENOUS at 13:25

## 2023-01-01 RX ADMIN — PIPERACILLIN SODIUM AND TAZOBACTAM SODIUM 2.25 G: 2; .25 INJECTION, SOLUTION INTRAVENOUS at 02:52

## 2023-01-01 RX ADMIN — SIMVASTATIN 40 MG: 40 TABLET, FILM COATED ORAL at 21:59

## 2023-01-01 RX ADMIN — NYSTATIN 1 APPLICATION: 100000 POWDER TOPICAL at 21:00

## 2023-01-01 RX ADMIN — HYDROCORTISONE SODIUM SUCCINATE 50 MG: 100 INJECTION, POWDER, FOR SOLUTION INTRAMUSCULAR; INTRAVENOUS at 18:32

## 2023-01-01 RX ADMIN — APIXABAN 2.5 MG: 2.5 TABLET, FILM COATED ORAL at 21:46

## 2023-01-01 RX ADMIN — COLLAGENASE SANTYL: 250 OINTMENT TOPICAL at 04:32

## 2023-01-01 RX ADMIN — DOXYCYCLINE HYCLATE 100 MG: 100 CAPSULE ORAL at 08:15

## 2023-01-01 RX ADMIN — FIDAXOMICIN 200 MG: 200 TABLET, FILM COATED ORAL at 08:01

## 2023-01-01 RX ADMIN — POTASSIUM PHOSPHATE, MONOBASIC 500 MG: 500 TABLET, SOLUBLE ORAL at 21:07

## 2023-01-01 RX ADMIN — HEPARIN SODIUM 2000 UNITS: 1000 INJECTION INTRAVENOUS; SUBCUTANEOUS at 21:12

## 2023-01-01 RX ADMIN — IPRATROPIUM BROMIDE AND ALBUTEROL SULFATE 3 ML: 2.5; .5 SOLUTION RESPIRATORY (INHALATION) at 18:59

## 2023-01-01 RX ADMIN — IPRATROPIUM BROMIDE AND ALBUTEROL SULFATE 3 ML: 2.5; .5 SOLUTION RESPIRATORY (INHALATION) at 20:50

## 2023-01-01 RX ADMIN — COLLAGENASE SANTYL: 250 OINTMENT TOPICAL at 09:02

## 2023-01-01 RX ADMIN — CALCIUM ACETATE 667 MG: 667 CAPSULE ORAL at 11:53

## 2023-01-01 RX ADMIN — NYSTATIN 1 APPLICATION: 100000 POWDER TOPICAL at 09:14

## 2023-01-01 RX ADMIN — ESCITALOPRAM OXALATE 10 MG: 10 TABLET ORAL at 08:58

## 2023-01-01 RX ADMIN — COLLAGENASE SANTYL: 250 OINTMENT TOPICAL at 09:00

## 2023-01-01 RX ADMIN — COLLAGENASE SANTYL: 250 OINTMENT TOPICAL at 09:56

## 2023-01-01 RX ADMIN — MIDODRINE HYDROCHLORIDE 15 MG: 5 TABLET ORAL at 17:26

## 2023-01-01 RX ADMIN — APIXABAN 2.5 MG: 2.5 TABLET, FILM COATED ORAL at 09:03

## 2023-01-01 RX ADMIN — POTASSIUM CHLORIDE 10 MEQ: 750 TABLET, EXTENDED RELEASE ORAL at 09:38

## 2023-01-01 RX ADMIN — APIXABAN 2.5 MG: 2.5 TABLET, FILM COATED ORAL at 09:00

## 2023-01-01 RX ADMIN — ESCITALOPRAM OXALATE 10 MG: 10 TABLET ORAL at 09:04

## 2023-01-01 RX ADMIN — NOREPINEPHRINE BITARTRATE 0.06 MCG/KG/MIN: 8 INJECTION, SOLUTION INTRAVENOUS at 01:04

## 2023-01-01 RX ADMIN — VANCOMYCIN HYDROCHLORIDE 125 MG: 125 CAPSULE ORAL at 22:17

## 2023-01-01 RX ADMIN — GABAPENTIN 100 MG: 100 CAPSULE ORAL at 13:26

## 2023-01-01 RX ADMIN — IPRATROPIUM BROMIDE AND ALBUTEROL SULFATE 3 ML: 2.5; .5 SOLUTION RESPIRATORY (INHALATION) at 07:28

## 2023-01-01 RX ADMIN — SIMVASTATIN 40 MG: 40 TABLET, FILM COATED ORAL at 20:27

## 2023-01-01 RX ADMIN — HEPARIN SODIUM 2000 UNITS: 1000 INJECTION, SOLUTION INTRAVENOUS; SUBCUTANEOUS at 18:00

## 2023-01-01 RX ADMIN — FEBUXOSTAT 40 MG: 40 TABLET ORAL at 08:15

## 2023-01-01 RX ADMIN — FIDAXOMICIN 200 MG: 200 TABLET, FILM COATED ORAL at 09:20

## 2023-01-01 RX ADMIN — NYSTATIN 1 APPLICATION: 100000 POWDER TOPICAL at 09:44

## 2023-01-01 RX ADMIN — CALCIUM ACETATE 667 MG: 667 CAPSULE ORAL at 16:19

## 2023-01-01 RX ADMIN — GABAPENTIN 100 MG: 100 CAPSULE ORAL at 21:59

## 2023-01-01 RX ADMIN — IPRATROPIUM BROMIDE AND ALBUTEROL SULFATE 3 ML: 2.5; .5 SOLUTION RESPIRATORY (INHALATION) at 20:16

## 2023-01-01 RX ADMIN — INSULIN LISPRO 1 UNITS: 100 INJECTION, SOLUTION INTRAVENOUS; SUBCUTANEOUS at 17:23

## 2023-01-01 RX ADMIN — POTASSIUM CHLORIDE 10 MEQ: 750 TABLET, EXTENDED RELEASE ORAL at 12:29

## 2023-01-01 RX ADMIN — CALCIUM ACETATE 667 MG: 667 CAPSULE ORAL at 16:18

## 2023-01-01 RX ADMIN — ESCITALOPRAM OXALATE 10 MG: 10 TABLET ORAL at 09:21

## 2023-01-01 RX ADMIN — POTASSIUM CHLORIDE 20 MEQ: 14.9 INJECTION, SOLUTION INTRAVENOUS at 10:59

## 2023-01-01 RX ADMIN — APIXABAN 2.5 MG: 2.5 TABLET, FILM COATED ORAL at 12:19

## 2023-01-01 RX ADMIN — CALCIUM ACETATE 667 MG: 667 CAPSULE ORAL at 09:38

## 2023-01-01 RX ADMIN — FENOFIBRATE 160 MG: 160 TABLET ORAL at 08:58

## 2023-01-01 RX ADMIN — MIDODRINE HYDROCHLORIDE 15 MG: 5 TABLET ORAL at 09:57

## 2023-01-01 RX ADMIN — GABAPENTIN 100 MG: 100 CAPSULE ORAL at 09:00

## 2023-01-01 RX ADMIN — INSULIN LISPRO 1 UNITS: 100 INJECTION, SOLUTION INTRAVENOUS; SUBCUTANEOUS at 17:07

## 2023-01-01 RX ADMIN — MIDODRINE HYDROCHLORIDE 15 MG: 5 TABLET ORAL at 09:21

## 2023-01-01 RX ADMIN — NYSTATIN 1 APPLICATION: 100000 POWDER TOPICAL at 21:59

## 2023-01-01 RX ADMIN — CALCIUM ACETATE 667 MG: 667 CAPSULE ORAL at 08:58

## 2023-01-01 RX ADMIN — FENOFIBRATE 160 MG: 160 TABLET ORAL at 08:37

## 2023-01-01 RX ADMIN — DEXAMETHASONE SODIUM PHOSPHATE 6 MG: 10 INJECTION INTRAMUSCULAR; INTRAVENOUS at 21:07

## 2023-01-01 RX ADMIN — MIDODRINE HYDROCHLORIDE 15 MG: 5 TABLET ORAL at 17:35

## 2023-01-01 RX ADMIN — MIDODRINE HYDROCHLORIDE 15 MG: 5 TABLET ORAL at 13:01

## 2023-01-01 RX ADMIN — INSULIN LISPRO 3 UNITS: 100 INJECTION, SOLUTION INTRAVENOUS; SUBCUTANEOUS at 16:57

## 2023-01-01 RX ADMIN — SIMVASTATIN 40 MG: 40 TABLET, FILM COATED ORAL at 20:13

## 2023-01-01 RX ADMIN — FENOFIBRATE 160 MG: 160 TABLET ORAL at 10:07

## 2023-01-01 RX ADMIN — INSULIN LISPRO 1 UNITS: 100 INJECTION, SOLUTION INTRAVENOUS; SUBCUTANEOUS at 18:35

## 2023-01-01 RX ADMIN — DOXYCYCLINE HYCLATE 100 MG: 100 CAPSULE ORAL at 15:40

## 2023-01-01 RX ADMIN — COLLAGENASE SANTYL: 250 OINTMENT TOPICAL at 09:43

## 2023-01-01 RX ADMIN — IPRATROPIUM BROMIDE AND ALBUTEROL SULFATE 3 ML: 2.5; .5 SOLUTION RESPIRATORY (INHALATION) at 12:12

## 2023-01-01 RX ADMIN — NYSTATIN 1 APPLICATION: 100000 POWDER TOPICAL at 08:41

## 2023-01-01 RX ADMIN — GABAPENTIN 100 MG: 100 CAPSULE ORAL at 21:23

## 2023-01-01 RX ADMIN — CALCIUM ACETATE 667 MG: 667 CAPSULE ORAL at 16:48

## 2023-01-01 RX ADMIN — APIXABAN 2.5 MG: 2.5 TABLET, FILM COATED ORAL at 20:27

## 2023-01-01 RX ADMIN — APIXABAN 2.5 MG: 2.5 TABLET, FILM COATED ORAL at 08:02

## 2023-01-01 RX ADMIN — INSULIN LISPRO 2 UNITS: 100 INJECTION, SOLUTION INTRAVENOUS; SUBCUTANEOUS at 09:48

## 2023-01-01 RX ADMIN — IPRATROPIUM BROMIDE AND ALBUTEROL SULFATE 3 ML: 2.5; .5 SOLUTION RESPIRATORY (INHALATION) at 07:34

## 2023-01-01 RX ADMIN — HEPARIN SODIUM 1600 UNITS: 1000 INJECTION INTRAVENOUS; SUBCUTANEOUS at 12:01

## 2023-01-01 RX ADMIN — GABAPENTIN 100 MG: 100 CAPSULE ORAL at 21:08

## 2023-01-01 RX ADMIN — FEBUXOSTAT 40 MG: 40 TABLET ORAL at 13:35

## 2023-01-01 RX ADMIN — HYDROCORTISONE SODIUM SUCCINATE 50 MG: 100 INJECTION, POWDER, FOR SOLUTION INTRAMUSCULAR; INTRAVENOUS at 21:07

## 2023-01-01 RX ADMIN — NYSTATIN 1 APPLICATION: 100000 POWDER TOPICAL at 12:39

## 2023-01-01 RX ADMIN — MIDODRINE HYDROCHLORIDE 10 MG: 10 TABLET ORAL at 09:04

## 2023-01-01 RX ADMIN — GABAPENTIN 300 MG: 300 CAPSULE ORAL at 10:07

## 2023-01-01 RX ADMIN — Medication 0.05 MCG/KG/MIN: at 20:26

## 2023-01-01 RX ADMIN — APIXABAN 2.5 MG: 2.5 TABLET, FILM COATED ORAL at 08:20

## 2023-01-01 RX ADMIN — PIPERACILLIN SODIUM AND TAZOBACTAM SODIUM 2.25 G: 2; .25 INJECTION, SOLUTION INTRAVENOUS at 14:25

## 2023-01-01 RX ADMIN — IPRATROPIUM BROMIDE AND ALBUTEROL SULFATE 3 ML: 2.5; .5 SOLUTION RESPIRATORY (INHALATION) at 19:44

## 2023-01-01 RX ADMIN — DOXYCYCLINE HYCLATE 100 MG: 100 CAPSULE ORAL at 08:01

## 2023-01-01 RX ADMIN — CALCIUM ACETATE 667 MG: 667 CAPSULE ORAL at 08:37

## 2023-01-01 RX ADMIN — MIDODRINE HYDROCHLORIDE 15 MG: 5 TABLET ORAL at 11:59

## 2023-01-01 RX ADMIN — IPRATROPIUM BROMIDE AND ALBUTEROL SULFATE 3 ML: 2.5; .5 SOLUTION RESPIRATORY (INHALATION) at 13:13

## 2023-01-01 RX ADMIN — VANCOMYCIN HYDROCHLORIDE 125 MG: 125 CAPSULE ORAL at 06:38

## 2023-01-01 RX ADMIN — COSYNTROPIN 250 MCG: 0.25 INJECTION, POWDER, LYOPHILIZED, FOR SOLUTION INTRAMUSCULAR; INTRAVENOUS at 11:28

## 2023-01-01 RX ADMIN — ALBUMIN (HUMAN) 25 G: 12.5 SOLUTION INTRAVENOUS at 22:11

## 2023-01-01 RX ADMIN — CALCIUM ACETATE 667 MG: 667 CAPSULE ORAL at 13:01

## 2023-01-01 RX ADMIN — FIDAXOMICIN 200 MG: 200 TABLET, FILM COATED ORAL at 21:07

## 2023-01-01 RX ADMIN — PIPERACILLIN SODIUM AND TAZOBACTAM SODIUM 2.25 G: 2; .25 INJECTION, SOLUTION INTRAVENOUS at 20:14

## 2023-01-01 RX ADMIN — MIDODRINE HYDROCHLORIDE 15 MG: 5 TABLET ORAL at 18:44

## 2023-01-01 RX ADMIN — ALBUMIN (HUMAN) 12.5 G: 5 SOLUTION INTRAVENOUS at 08:23

## 2023-01-01 RX ADMIN — APIXABAN 2.5 MG: 2.5 TABLET, FILM COATED ORAL at 08:38

## 2023-01-01 RX ADMIN — IPRATROPIUM BROMIDE AND ALBUTEROL SULFATE 3 ML: 2.5; .5 SOLUTION RESPIRATORY (INHALATION) at 12:34

## 2023-01-01 RX ADMIN — HYDROCODONE BITARTRATE AND ACETAMINOPHEN 1 TABLET: 5; 325 TABLET ORAL at 15:43

## 2023-01-01 RX ADMIN — EPOETIN ALFA-EPBX 10000 UNITS: 10000 INJECTION, SOLUTION INTRAVENOUS; SUBCUTANEOUS at 09:31

## 2023-01-01 RX ADMIN — MIDODRINE HYDROCHLORIDE 15 MG: 5 TABLET ORAL at 17:07

## 2023-01-01 RX ADMIN — FEBUXOSTAT 40 MG: 40 TABLET ORAL at 08:37

## 2023-01-01 RX ADMIN — GABAPENTIN 100 MG: 100 CAPSULE ORAL at 21:28

## 2023-01-01 RX ADMIN — APIXABAN 2.5 MG: 2.5 TABLET, FILM COATED ORAL at 21:36

## 2023-01-01 RX ADMIN — SIMVASTATIN 40 MG: 40 TABLET, FILM COATED ORAL at 21:42

## 2023-01-01 RX ADMIN — MIDODRINE HYDROCHLORIDE 10 MG: 10 TABLET ORAL at 12:42

## 2023-01-01 RX ADMIN — PIPERACILLIN SODIUM AND TAZOBACTAM SODIUM 2.25 G: 2; .25 INJECTION, SOLUTION INTRAVENOUS at 08:15

## 2023-01-01 RX ADMIN — DOXYCYCLINE HYCLATE 100 MG: 100 CAPSULE ORAL at 09:43

## 2023-01-01 RX ADMIN — ACETAMINOPHEN 650 MG: 325 TABLET ORAL at 21:43

## 2023-01-01 RX ADMIN — CALCIUM ACETATE 667 MG: 667 CAPSULE ORAL at 18:13

## 2023-01-01 RX ADMIN — MIDODRINE HYDROCHLORIDE 15 MG: 5 TABLET ORAL at 08:26

## 2023-01-01 RX ADMIN — ESCITALOPRAM OXALATE 10 MG: 10 TABLET ORAL at 08:19

## 2023-01-01 RX ADMIN — DOXYCYCLINE HYCLATE 100 MG: 100 CAPSULE ORAL at 09:57

## 2023-01-01 RX ADMIN — FEBUXOSTAT 40 MG: 40 TABLET ORAL at 12:42

## 2023-01-01 RX ADMIN — FENOFIBRATE 160 MG: 160 TABLET ORAL at 08:51

## 2023-01-01 RX ADMIN — DOXYCYCLINE HYCLATE 100 MG: 100 CAPSULE ORAL at 09:03

## 2023-01-01 RX ADMIN — MIDODRINE HYDROCHLORIDE 15 MG: 5 TABLET ORAL at 13:33

## 2023-01-01 RX ADMIN — GABAPENTIN 100 MG: 100 CAPSULE ORAL at 08:58

## 2023-01-01 RX ADMIN — EPOETIN ALFA-EPBX 10000 UNITS: 10000 INJECTION, SOLUTION INTRAVENOUS; SUBCUTANEOUS at 10:36

## 2023-01-01 RX ADMIN — GABAPENTIN 100 MG: 100 CAPSULE ORAL at 21:39

## 2023-01-01 RX ADMIN — ALBUMIN (HUMAN) 25 G: 12.5 INJECTION, SOLUTION INTRAVENOUS at 18:25

## 2023-01-01 RX ADMIN — COLLAGENASE SANTYL: 250 OINTMENT TOPICAL at 08:42

## 2023-01-01 RX ADMIN — INSULIN GLARGINE 6 UNITS: 100 INJECTION, SOLUTION SUBCUTANEOUS at 20:50

## 2023-01-01 RX ADMIN — IPRATROPIUM BROMIDE AND ALBUTEROL SULFATE 3 ML: 2.5; .5 SOLUTION RESPIRATORY (INHALATION) at 19:42

## 2023-01-01 RX ADMIN — CEFEPIME 1 G: 1 INJECTION, POWDER, FOR SOLUTION INTRAMUSCULAR; INTRAVENOUS at 18:44

## 2023-01-01 RX ADMIN — MIDODRINE HYDROCHLORIDE 15 MG: 5 TABLET ORAL at 17:19

## 2023-01-01 RX ADMIN — GABAPENTIN 100 MG: 100 CAPSULE ORAL at 09:43

## 2023-01-01 RX ADMIN — NYSTATIN 1 APPLICATION: 100000 POWDER TOPICAL at 21:38

## 2023-01-01 RX ADMIN — MIDODRINE HYDROCHLORIDE 15 MG: 5 TABLET ORAL at 12:08

## 2023-01-01 RX ADMIN — COLLAGENASE SANTYL: 250 OINTMENT TOPICAL at 10:08

## 2023-01-01 RX ADMIN — EPOETIN ALFA-EPBX 10000 UNITS: 10000 INJECTION, SOLUTION INTRAVENOUS; SUBCUTANEOUS at 10:28

## 2023-01-01 RX ADMIN — ALBUMIN (HUMAN) 25 G: 12.5 SOLUTION INTRAVENOUS at 05:06

## 2023-01-01 RX ADMIN — MIDODRINE HYDROCHLORIDE 15 MG: 5 TABLET ORAL at 16:32

## 2023-01-01 RX ADMIN — NYSTATIN 1 APPLICATION: 100000 POWDER TOPICAL at 10:02

## 2023-01-01 RX ADMIN — EPOETIN ALFA-EPBX 10000 UNITS: 10000 INJECTION, SOLUTION INTRAVENOUS; SUBCUTANEOUS at 12:39

## 2023-01-01 RX ADMIN — GABAPENTIN 100 MG: 100 CAPSULE ORAL at 09:21

## 2023-01-01 RX ADMIN — IPRATROPIUM BROMIDE AND ALBUTEROL SULFATE 3 ML: 2.5; .5 SOLUTION RESPIRATORY (INHALATION) at 07:05

## 2023-01-01 RX ADMIN — NYSTATIN 1 APPLICATION: 100000 POWDER TOPICAL at 21:22

## 2023-01-01 RX ADMIN — VANCOMYCIN HYDROCHLORIDE 125 MG: 125 CAPSULE ORAL at 13:42

## 2023-01-01 RX ADMIN — MIDODRINE HYDROCHLORIDE 15 MG: 5 TABLET ORAL at 13:12

## 2023-01-01 RX ADMIN — DOXYCYCLINE HYCLATE 100 MG: 100 CAPSULE ORAL at 10:18

## 2023-01-01 RX ADMIN — CALCIUM ACETATE 667 MG: 667 CAPSULE ORAL at 10:07

## 2023-01-01 RX ADMIN — APIXABAN 2.5 MG: 2.5 TABLET, FILM COATED ORAL at 10:07

## 2023-01-01 RX ADMIN — FENOFIBRATE 160 MG: 160 TABLET ORAL at 09:22

## 2023-01-01 RX ADMIN — METOPROLOL SUCCINATE 12.5 MG: 25 TABLET, EXTENDED RELEASE ORAL at 13:27

## 2023-01-01 RX ADMIN — FEBUXOSTAT 40 MG: 40 TABLET ORAL at 15:41

## 2023-01-01 RX ADMIN — APIXABAN 2.5 MG: 2.5 TABLET, FILM COATED ORAL at 09:57

## 2023-01-01 RX ADMIN — VANCOMYCIN HYDROCHLORIDE 125 MG: 125 CAPSULE ORAL at 12:29

## 2023-01-01 RX ADMIN — GABAPENTIN 100 MG: 100 CAPSULE ORAL at 08:37

## 2023-01-01 RX ADMIN — NYSTATIN 1 APPLICATION: 100000 POWDER TOPICAL at 08:29

## 2023-01-01 RX ADMIN — NYSTATIN 1 APPLICATION: 100000 POWDER TOPICAL at 08:54

## 2023-01-01 RX ADMIN — HYDROCODONE BITARTRATE AND ACETAMINOPHEN 1 TABLET: 5; 325 TABLET ORAL at 23:45

## 2023-01-01 RX ADMIN — FIDAXOMICIN 200 MG: 200 TABLET, FILM COATED ORAL at 08:57

## 2023-01-01 RX ADMIN — IPRATROPIUM BROMIDE AND ALBUTEROL SULFATE 3 ML: 2.5; .5 SOLUTION RESPIRATORY (INHALATION) at 07:00

## 2023-01-01 RX ADMIN — LIDOCAINE HYDROCHLORIDE 5 ML: 20 INJECTION, SOLUTION EPIDURAL; INFILTRATION; INTRACAUDAL at 14:25

## 2023-01-01 RX ADMIN — FIDAXOMICIN 200 MG: 200 TABLET, FILM COATED ORAL at 21:22

## 2023-01-01 RX ADMIN — VANCOMYCIN HYDROCHLORIDE 125 MG: 125 CAPSULE ORAL at 05:59

## 2023-01-01 RX ADMIN — INSULIN LISPRO 1 UNITS: 100 INJECTION, SOLUTION INTRAVENOUS; SUBCUTANEOUS at 16:48

## 2023-01-01 RX ADMIN — HEPARIN SODIUM 1600 UNITS: 1000 INJECTION INTRAVENOUS; SUBCUTANEOUS at 12:51

## 2023-01-01 RX ADMIN — VANCOMYCIN HYDROCHLORIDE 125 MG: 125 CAPSULE ORAL at 13:45

## 2023-01-01 RX ADMIN — EPOETIN ALFA-EPBX 10000 UNITS: 10000 INJECTION, SOLUTION INTRAVENOUS; SUBCUTANEOUS at 09:48

## 2023-01-01 RX ADMIN — SIMVASTATIN 40 MG: 40 TABLET, FILM COATED ORAL at 21:45

## 2023-01-01 RX ADMIN — GABAPENTIN 100 MG: 100 CAPSULE ORAL at 20:53

## 2023-01-01 RX ADMIN — PIPERACILLIN SODIUM AND TAZOBACTAM SODIUM 2.25 G: 2; .25 INJECTION, SOLUTION INTRAVENOUS at 01:59

## 2023-01-01 RX ADMIN — HEPARIN SODIUM 1600 UNITS: 1000 INJECTION INTRAVENOUS; SUBCUTANEOUS at 18:00

## 2023-01-01 RX ADMIN — FENOFIBRATE 160 MG: 160 TABLET ORAL at 08:15

## 2023-01-01 RX ADMIN — NOREPINEPHRINE BITARTRATE 0.01 MCG/KG/MIN: 8 INJECTION, SOLUTION INTRAVENOUS at 20:22

## 2023-01-01 RX ADMIN — HYDROCORTISONE SODIUM SUCCINATE 100 MG: 100 INJECTION, POWDER, FOR SOLUTION INTRAMUSCULAR; INTRAVENOUS at 06:02

## 2023-01-01 RX ADMIN — MIDODRINE HYDROCHLORIDE 15 MG: 5 TABLET ORAL at 08:58

## 2023-01-01 RX ADMIN — CALCIUM ACETATE 667 MG: 667 CAPSULE ORAL at 09:22

## 2023-01-01 RX ADMIN — MIDODRINE HYDROCHLORIDE 15 MG: 5 TABLET ORAL at 12:18

## 2023-01-01 RX ADMIN — NYSTATIN 1 APPLICATION: 100000 POWDER TOPICAL at 08:57

## 2023-01-01 RX ADMIN — FEBUXOSTAT 40 MG: 40 TABLET ORAL at 12:23

## 2023-01-01 RX ADMIN — GABAPENTIN 100 MG: 100 CAPSULE ORAL at 20:39

## 2023-01-01 RX ADMIN — NYSTATIN 1 APPLICATION: 100000 POWDER TOPICAL at 00:08

## 2023-01-01 RX ADMIN — Medication 2 L/MIN: at 17:02

## 2023-01-01 RX ADMIN — VANCOMYCIN HYDROCHLORIDE 125 MG: 125 CAPSULE ORAL at 06:15

## 2023-01-01 RX ADMIN — INSULIN LISPRO 1 UNITS: 100 INJECTION, SOLUTION INTRAVENOUS; SUBCUTANEOUS at 18:43

## 2023-01-01 RX ADMIN — MIDODRINE HYDROCHLORIDE 15 MG: 5 TABLET ORAL at 12:44

## 2023-01-01 RX ADMIN — GABAPENTIN 100 MG: 100 CAPSULE ORAL at 08:51

## 2023-01-01 RX ADMIN — FIDAXOMICIN 200 MG: 200 TABLET, FILM COATED ORAL at 20:02

## 2023-01-01 RX ADMIN — VANCOMYCIN HYDROCHLORIDE 125 MG: 125 CAPSULE ORAL at 06:29

## 2023-01-01 RX ADMIN — SIMVASTATIN 40 MG: 40 TABLET, FILM COATED ORAL at 21:11

## 2023-01-01 RX ADMIN — ESCITALOPRAM OXALATE 10 MG: 10 TABLET ORAL at 09:43

## 2023-01-01 RX ADMIN — EPOETIN ALFA-EPBX 10000 UNITS: 10000 INJECTION, SOLUTION INTRAVENOUS; SUBCUTANEOUS at 11:56

## 2023-01-01 RX ADMIN — ESCITALOPRAM OXALATE 10 MG: 10 TABLET ORAL at 09:38

## 2023-01-01 RX ADMIN — COLLAGENASE SANTYL: 250 OINTMENT TOPICAL at 09:46

## 2023-01-01 RX ADMIN — Medication 0.05 MCG/KG/MIN: at 10:17

## 2023-01-01 RX ADMIN — PIPERACILLIN SODIUM AND TAZOBACTAM SODIUM 2.25 G: 2; .25 INJECTION, SOLUTION INTRAVENOUS at 02:35

## 2023-01-01 RX ADMIN — APIXABAN 2.5 MG: 2.5 TABLET, FILM COATED ORAL at 08:28

## 2023-01-01 RX ADMIN — IPRATROPIUM BROMIDE AND ALBUTEROL SULFATE 3 ML: 2.5; .5 SOLUTION RESPIRATORY (INHALATION) at 20:05

## 2023-01-01 RX ADMIN — VANCOMYCIN HYDROCHLORIDE 125 MG: 125 CAPSULE ORAL at 17:51

## 2023-01-01 RX ADMIN — IPRATROPIUM BROMIDE AND ALBUTEROL SULFATE 3 ML: 2.5; .5 SOLUTION RESPIRATORY (INHALATION) at 20:30

## 2023-01-01 RX ADMIN — IPRATROPIUM BROMIDE AND ALBUTEROL SULFATE 3 ML: 2.5; .5 SOLUTION RESPIRATORY (INHALATION) at 19:43

## 2023-01-01 RX ADMIN — IPRATROPIUM BROMIDE AND ALBUTEROL SULFATE 3 ML: 2.5; .5 SOLUTION RESPIRATORY (INHALATION) at 20:19

## 2023-01-01 RX ADMIN — MIDODRINE HYDROCHLORIDE 15 MG: 5 TABLET ORAL at 17:51

## 2023-01-01 RX ADMIN — NYSTATIN 1 APPLICATION: 100000 POWDER TOPICAL at 08:49

## 2023-01-01 RX ADMIN — MIDODRINE HYDROCHLORIDE 15 MG: 5 TABLET ORAL at 12:29

## 2023-01-01 RX ADMIN — PIPERACILLIN SODIUM AND TAZOBACTAM SODIUM 2.25 G: 2; .25 INJECTION, SOLUTION INTRAVENOUS at 21:43

## 2023-01-01 RX ADMIN — CALCIUM ACETATE 667 MG: 667 CAPSULE ORAL at 17:19

## 2023-01-01 RX ADMIN — MIDODRINE HYDROCHLORIDE 15 MG: 5 TABLET ORAL at 16:35

## 2023-01-01 RX ADMIN — VANCOMYCIN HYDROCHLORIDE 125 MG: 125 CAPSULE ORAL at 12:53

## 2023-01-01 RX ADMIN — IPRATROPIUM BROMIDE AND ALBUTEROL SULFATE 3 ML: 2.5; .5 SOLUTION RESPIRATORY (INHALATION) at 20:29

## 2023-01-01 RX ADMIN — SIMVASTATIN 40 MG: 40 TABLET, FILM COATED ORAL at 00:08

## 2023-01-01 RX ADMIN — MIDODRINE HYDROCHLORIDE 15 MG: 5 TABLET ORAL at 17:20

## 2023-01-01 RX ADMIN — INSULIN LISPRO 2 UNITS: 100 INJECTION, SOLUTION INTRAVENOUS; SUBCUTANEOUS at 12:35

## 2023-01-01 RX ADMIN — NYSTATIN 1 APPLICATION: 100000 POWDER TOPICAL at 13:26

## 2023-01-01 RX ADMIN — LIDOCAINE HYDROCHLORIDE 5 ML: 10 INJECTION, SOLUTION EPIDURAL; INFILTRATION; INTRACAUDAL; PERINEURAL at 14:59

## 2023-01-01 RX ADMIN — MIDODRINE HYDROCHLORIDE 15 MG: 5 TABLET ORAL at 08:15

## 2023-01-01 RX ADMIN — IPRATROPIUM BROMIDE AND ALBUTEROL SULFATE 3 ML: 2.5; .5 SOLUTION RESPIRATORY (INHALATION) at 11:40

## 2023-01-01 RX ADMIN — Medication: at 21:38

## 2023-01-01 RX ADMIN — CALCIUM ACETATE 667 MG: 667 CAPSULE ORAL at 08:15

## 2023-01-01 RX ADMIN — FENOFIBRATE 160 MG: 160 TABLET ORAL at 12:25

## 2023-01-01 RX ADMIN — MIDODRINE HYDROCHLORIDE 15 MG: 5 TABLET ORAL at 12:34

## 2023-01-01 RX ADMIN — ESCITALOPRAM OXALATE 10 MG: 10 TABLET ORAL at 09:50

## 2023-01-01 RX ADMIN — NYSTATIN 1 APPLICATION: 100000 POWDER TOPICAL at 13:38

## 2023-01-01 RX ADMIN — HYDROCORTISONE SODIUM SUCCINATE 100 MG: 100 INJECTION, POWDER, FOR SOLUTION INTRAMUSCULAR; INTRAVENOUS at 05:46

## 2023-01-01 RX ADMIN — MIDODRINE HYDROCHLORIDE 15 MG: 5 TABLET ORAL at 08:37

## 2023-01-01 RX ADMIN — CALCIUM ACETATE 667 MG: 667 CAPSULE ORAL at 11:33

## 2023-01-01 RX ADMIN — NYSTATIN 1 APPLICATION: 100000 POWDER TOPICAL at 20:22

## 2023-01-01 RX ADMIN — GABAPENTIN 100 MG: 100 CAPSULE ORAL at 09:57

## 2023-01-01 RX ADMIN — PIPERACILLIN SODIUM AND TAZOBACTAM SODIUM 2.25 G: 2; .25 INJECTION, SOLUTION INTRAVENOUS at 01:58

## 2023-01-01 RX ADMIN — NYSTATIN 1 APPLICATION: 100000 POWDER TOPICAL at 09:00

## 2023-01-01 RX ADMIN — VANCOMYCIN HYDROCHLORIDE 125 MG: 125 CAPSULE ORAL at 21:59

## 2023-01-01 RX ADMIN — FENOFIBRATE 160 MG: 160 TABLET ORAL at 08:00

## 2023-01-01 RX ADMIN — IPRATROPIUM BROMIDE AND ALBUTEROL SULFATE 3 ML: 2.5; .5 SOLUTION RESPIRATORY (INHALATION) at 19:51

## 2023-01-01 RX ADMIN — ESCITALOPRAM OXALATE 10 MG: 10 TABLET ORAL at 08:53

## 2023-01-01 RX ADMIN — INSULIN LISPRO 1 UNITS: 100 INJECTION, SOLUTION INTRAVENOUS; SUBCUTANEOUS at 12:18

## 2023-01-01 RX ADMIN — NYSTATIN 1 APPLICATION: 100000 POWDER TOPICAL at 21:18

## 2023-01-01 RX ADMIN — INSULIN LISPRO 1 UNITS: 100 INJECTION, SOLUTION INTRAVENOUS; SUBCUTANEOUS at 12:50

## 2023-01-01 RX ADMIN — APIXABAN 2.5 MG: 2.5 TABLET, FILM COATED ORAL at 21:44

## 2023-01-01 RX ADMIN — VANCOMYCIN HYDROCHLORIDE 500 MG: 250 CAPSULE ORAL at 18:28

## 2023-01-01 RX ADMIN — MIDODRINE HYDROCHLORIDE 15 MG: 5 TABLET ORAL at 13:42

## 2023-01-01 RX ADMIN — Medication 0.02 MCG/KG/MIN: at 08:20

## 2023-01-01 RX ADMIN — PIPERACILLIN SODIUM AND TAZOBACTAM SODIUM 2.25 G: 2; .25 INJECTION, SOLUTION INTRAVENOUS at 03:21

## 2023-01-01 RX ADMIN — IPRATROPIUM BROMIDE AND ALBUTEROL SULFATE 3 ML: 2.5; .5 SOLUTION RESPIRATORY (INHALATION) at 08:04

## 2023-01-01 RX ADMIN — VANCOMYCIN HYDROCHLORIDE 125 MG: 125 CAPSULE ORAL at 17:19

## 2023-01-01 RX ADMIN — NYSTATIN 1 APPLICATION: 100000 POWDER TOPICAL at 09:56

## 2023-01-01 RX ADMIN — MIDODRINE HYDROCHLORIDE 15 MG: 5 TABLET ORAL at 09:38

## 2023-01-01 RX ADMIN — CALCIUM ACETATE 667 MG: 667 CAPSULE ORAL at 18:08

## 2023-01-01 RX ADMIN — GABAPENTIN 100 MG: 100 CAPSULE ORAL at 08:53

## 2023-01-01 RX ADMIN — INSULIN LISPRO 1 UNITS: 100 INJECTION, SOLUTION INTRAVENOUS; SUBCUTANEOUS at 11:55

## 2023-01-01 RX ADMIN — ALBUMIN (HUMAN) 25 G: 12.5 INJECTION, SOLUTION INTRAVENOUS at 11:32

## 2023-01-01 RX ADMIN — IPRATROPIUM BROMIDE AND ALBUTEROL SULFATE 3 ML: 2.5; .5 SOLUTION RESPIRATORY (INHALATION) at 07:49

## 2023-01-01 RX ADMIN — NYSTATIN 1 APPLICATION: 100000 POWDER TOPICAL at 21:23

## 2023-01-01 RX ADMIN — GABAPENTIN 300 MG: 300 CAPSULE ORAL at 09:02

## 2023-01-01 RX ADMIN — ESCITALOPRAM OXALATE 10 MG: 10 TABLET ORAL at 09:22

## 2023-01-01 RX ADMIN — FIDAXOMICIN 200 MG: 200 TABLET, FILM COATED ORAL at 08:54

## 2023-01-01 RX ADMIN — IPRATROPIUM BROMIDE AND ALBUTEROL SULFATE 3 ML: 2.5; .5 SOLUTION RESPIRATORY (INHALATION) at 19:56

## 2023-01-01 RX ADMIN — MIDODRINE HYDROCHLORIDE 15 MG: 5 TABLET ORAL at 18:27

## 2023-01-01 RX ADMIN — FIDAXOMICIN 200 MG: 200 TABLET, FILM COATED ORAL at 20:14

## 2023-01-01 RX ADMIN — VANCOMYCIN HYDROCHLORIDE 125 MG: 125 CAPSULE ORAL at 21:01

## 2023-01-01 RX ADMIN — MIDODRINE HYDROCHLORIDE 15 MG: 5 TABLET ORAL at 11:47

## 2023-01-01 RX ADMIN — SIMVASTATIN 40 MG: 40 TABLET, FILM COATED ORAL at 21:38

## 2023-01-01 RX ADMIN — IPRATROPIUM BROMIDE AND ALBUTEROL SULFATE 3 ML: 2.5; .5 SOLUTION RESPIRATORY (INHALATION) at 11:45

## 2023-01-01 RX ADMIN — SIMVASTATIN 40 MG: 40 TABLET, FILM COATED ORAL at 21:23

## 2023-01-01 RX ADMIN — DOXYCYCLINE HYCLATE 100 MG: 100 CAPSULE ORAL at 09:38

## 2023-01-01 RX ADMIN — POTASSIUM CHLORIDE 20 MEQ: 1500 TABLET, EXTENDED RELEASE ORAL at 16:18

## 2023-01-01 RX ADMIN — ESCITALOPRAM OXALATE 10 MG: 10 TABLET ORAL at 09:57

## 2023-01-01 RX ADMIN — GABAPENTIN 100 MG: 100 CAPSULE ORAL at 08:27

## 2023-01-01 RX ADMIN — PIPERACILLIN SODIUM AND TAZOBACTAM SODIUM 2.25 G: 2; .25 INJECTION, SOLUTION INTRAVENOUS at 08:05

## 2023-01-01 RX ADMIN — GABAPENTIN 300 MG: 300 CAPSULE ORAL at 23:20

## 2023-01-01 RX ADMIN — HEPARIN SODIUM 2000 UNITS: 1000 INJECTION INTRAVENOUS; SUBCUTANEOUS at 21:11

## 2023-01-01 RX ADMIN — HYDROCORTISONE SODIUM SUCCINATE 100 MG: 100 INJECTION, POWDER, FOR SOLUTION INTRAMUSCULAR; INTRAVENOUS at 21:28

## 2023-01-01 RX ADMIN — VANCOMYCIN HYDROCHLORIDE 125 MG: 125 CAPSULE ORAL at 17:20

## 2023-01-01 RX ADMIN — COLLAGENASE SANTYL: 250 OINTMENT TOPICAL at 17:28

## 2023-01-01 RX ADMIN — APIXABAN 2.5 MG: 2.5 TABLET, FILM COATED ORAL at 08:15

## 2023-01-01 RX ADMIN — SIMVASTATIN 40 MG: 40 TABLET, FILM COATED ORAL at 21:08

## 2023-01-01 RX ADMIN — FEBUXOSTAT 40 MG: 40 TABLET ORAL at 09:22

## 2023-01-01 RX ADMIN — FIDAXOMICIN 200 MG: 200 TABLET, FILM COATED ORAL at 09:00

## 2023-01-01 RX ADMIN — VANCOMYCIN HYDROCHLORIDE 125 MG: 125 CAPSULE ORAL at 16:32

## 2023-01-01 RX ADMIN — COLLAGENASE SANTYL: 250 OINTMENT TOPICAL at 13:38

## 2023-01-01 RX ADMIN — GABAPENTIN 100 MG: 100 CAPSULE ORAL at 10:17

## 2023-01-01 RX ADMIN — MIDODRINE HYDROCHLORIDE 15 MG: 5 TABLET ORAL at 12:07

## 2023-01-01 RX ADMIN — CALCIUM ACETATE 667 MG: 667 CAPSULE ORAL at 17:23

## 2023-01-01 RX ADMIN — IPRATROPIUM BROMIDE AND ALBUTEROL SULFATE 3 ML: 2.5; .5 SOLUTION RESPIRATORY (INHALATION) at 11:24

## 2023-01-01 RX ADMIN — IPRATROPIUM BROMIDE AND ALBUTEROL SULFATE 3 ML: 2.5; .5 SOLUTION RESPIRATORY (INHALATION) at 12:06

## 2023-01-01 RX ADMIN — DOXYCYCLINE HYCLATE 100 MG: 100 CAPSULE ORAL at 08:58

## 2023-01-01 RX ADMIN — CALCIUM ACETATE 2000 MG: 667 CAPSULE ORAL at 09:03

## 2023-01-01 RX ADMIN — FEBUXOSTAT 40 MG: 40 TABLET ORAL at 09:21

## 2023-01-01 RX ADMIN — COLLAGENASE SANTYL: 250 OINTMENT TOPICAL at 09:50

## 2023-01-01 RX ADMIN — HEPARIN SODIUM 3200 UNITS: 1000 INJECTION INTRAVENOUS; SUBCUTANEOUS at 11:55

## 2023-01-01 RX ADMIN — GABAPENTIN 100 MG: 100 CAPSULE ORAL at 20:02

## 2023-01-01 RX ADMIN — PIPERACILLIN SODIUM AND TAZOBACTAM SODIUM 2.25 G: 2; .25 INJECTION, SOLUTION INTRAVENOUS at 21:59

## 2023-01-01 RX ADMIN — VANCOMYCIN HYDROCHLORIDE 125 MG: 125 CAPSULE ORAL at 20:38

## 2023-01-01 RX ADMIN — DOXYCYCLINE HYCLATE 100 MG: 100 CAPSULE ORAL at 08:51

## 2023-01-01 RX ADMIN — APIXABAN 2.5 MG: 2.5 TABLET, FILM COATED ORAL at 08:53

## 2023-01-01 RX ADMIN — IPRATROPIUM BROMIDE AND ALBUTEROL SULFATE 3 ML: 2.5; .5 SOLUTION RESPIRATORY (INHALATION) at 06:56

## 2023-01-01 RX ADMIN — CALCIUM ACETATE 667 MG: 667 CAPSULE ORAL at 12:21

## 2023-01-01 RX ADMIN — GABAPENTIN 100 MG: 100 CAPSULE ORAL at 21:46

## 2023-01-01 RX ADMIN — VANCOMYCIN HYDROCHLORIDE 125 MG: 125 CAPSULE ORAL at 06:08

## 2023-01-01 RX ADMIN — SIMVASTATIN 40 MG: 40 TABLET, FILM COATED ORAL at 23:20

## 2023-01-01 RX ADMIN — GABAPENTIN 100 MG: 100 CAPSULE ORAL at 20:22

## 2023-01-01 RX ADMIN — APIXABAN 2.5 MG: 2.5 TABLET, FILM COATED ORAL at 21:08

## 2023-01-01 RX ADMIN — CALCIUM ACETATE 667 MG: 667 CAPSULE ORAL at 09:57

## 2023-01-01 RX ADMIN — METOPROLOL SUCCINATE 12.5 MG: 25 TABLET, EXTENDED RELEASE ORAL at 11:14

## 2023-01-01 RX ADMIN — IPRATROPIUM BROMIDE AND ALBUTEROL SULFATE 3 ML: 2.5; .5 SOLUTION RESPIRATORY (INHALATION) at 20:55

## 2023-01-01 RX ADMIN — NOREPINEPHRINE BITARTRATE 0.06 MCG/KG/MIN: 8 INJECTION, SOLUTION INTRAVENOUS at 23:21

## 2023-01-01 RX ADMIN — CALCIUM ACETATE 667 MG: 667 CAPSULE ORAL at 18:16

## 2023-01-01 RX ADMIN — IPRATROPIUM BROMIDE AND ALBUTEROL SULFATE 3 ML: 2.5; .5 SOLUTION RESPIRATORY (INHALATION) at 21:38

## 2023-01-01 RX ADMIN — MIDODRINE HYDROCHLORIDE 15 MG: 5 TABLET ORAL at 09:00

## 2023-01-01 RX ADMIN — IPRATROPIUM BROMIDE AND ALBUTEROL SULFATE 3 ML: 2.5; .5 SOLUTION RESPIRATORY (INHALATION) at 07:32

## 2023-01-01 RX ADMIN — SIMVASTATIN 40 MG: 40 TABLET, FILM COATED ORAL at 21:00

## 2023-01-01 RX ADMIN — ESCITALOPRAM OXALATE 10 MG: 10 TABLET ORAL at 08:38

## 2023-01-01 RX ADMIN — VANCOMYCIN HYDROCHLORIDE 125 MG: 125 CAPSULE ORAL at 21:38

## 2023-01-01 RX ADMIN — FENOFIBRATE 160 MG: 160 TABLET ORAL at 13:26

## 2023-01-01 RX ADMIN — IPRATROPIUM BROMIDE AND ALBUTEROL SULFATE 3 ML: 2.5; .5 SOLUTION RESPIRATORY (INHALATION) at 14:34

## 2023-01-01 RX ADMIN — Medication 0.05 MCG/KG/MIN: at 21:00

## 2023-01-01 RX ADMIN — GABAPENTIN 300 MG: 300 CAPSULE ORAL at 00:08

## 2023-01-01 RX ADMIN — FIDAXOMICIN 200 MG: 200 TABLET, FILM COATED ORAL at 09:21

## 2023-01-01 RX ADMIN — SIMVASTATIN 40 MG: 40 TABLET, FILM COATED ORAL at 20:02

## 2023-01-01 RX ADMIN — FIDAXOMICIN 200 MG: 200 TABLET, FILM COATED ORAL at 21:39

## 2023-01-01 RX ADMIN — VANCOMYCIN HYDROCHLORIDE 125 MG: 125 CAPSULE ORAL at 07:48

## 2023-01-01 RX ADMIN — ESCITALOPRAM OXALATE 10 MG: 10 TABLET ORAL at 08:27

## 2023-01-01 RX ADMIN — APIXABAN 2.5 MG: 2.5 TABLET, FILM COATED ORAL at 20:38

## 2023-01-01 RX ADMIN — IPRATROPIUM BROMIDE AND ALBUTEROL SULFATE 3 ML: 2.5; .5 SOLUTION RESPIRATORY (INHALATION) at 07:43

## 2023-01-01 RX ADMIN — VANCOMYCIN HYDROCHLORIDE 125 MG: 125 CAPSULE ORAL at 10:02

## 2023-01-01 RX ADMIN — ACETAMINOPHEN 650 MG: 325 TABLET ORAL at 20:21

## 2023-01-01 RX ADMIN — HYDROCORTISONE SODIUM SUCCINATE 100 MG: 100 INJECTION, POWDER, FOR SOLUTION INTRAMUSCULAR; INTRAVENOUS at 22:00

## 2023-01-01 RX ADMIN — Medication 0.09 MCG/KG/MIN: at 10:28

## 2023-01-01 RX ADMIN — NYSTATIN 1 APPLICATION: 100000 POWDER TOPICAL at 21:07

## 2023-01-01 RX ADMIN — DEXTROSE MONOHYDRATE 25 G: 25 INJECTION, SOLUTION INTRAVENOUS at 16:58

## 2023-01-01 RX ADMIN — VANCOMYCIN HYDROCHLORIDE 125 MG: 125 CAPSULE ORAL at 06:46

## 2023-01-01 RX ADMIN — MIDODRINE HYDROCHLORIDE 15 MG: 5 TABLET ORAL at 13:26

## 2023-01-01 RX ADMIN — APIXABAN 2.5 MG: 2.5 TABLET, FILM COATED ORAL at 21:11

## 2023-01-01 RX ADMIN — PIPERACILLIN SODIUM AND TAZOBACTAM SODIUM 2.25 G: 2; .25 INJECTION, SOLUTION INTRAVENOUS at 14:47

## 2023-01-01 RX ADMIN — IPRATROPIUM BROMIDE AND ALBUTEROL SULFATE 3 ML: 2.5; .5 SOLUTION RESPIRATORY (INHALATION) at 07:31

## 2023-01-01 RX ADMIN — SIMVASTATIN 40 MG: 40 TABLET, FILM COATED ORAL at 21:39

## 2023-01-01 RX ADMIN — VANCOMYCIN HYDROCHLORIDE 125 MG: 125 CAPSULE ORAL at 06:09

## 2023-01-01 RX ADMIN — GABAPENTIN 100 MG: 100 CAPSULE ORAL at 20:49

## 2023-01-01 RX ADMIN — MIDODRINE HYDROCHLORIDE 15 MG: 5 TABLET ORAL at 08:27

## 2023-01-01 RX ADMIN — FIDAXOMICIN 200 MG: 200 TABLET, FILM COATED ORAL at 21:11

## 2023-01-01 RX ADMIN — NYSTATIN 1 APPLICATION: 100000 POWDER TOPICAL at 21:47

## 2023-01-01 RX ADMIN — PIPERACILLIN SODIUM AND TAZOBACTAM SODIUM 2.25 G: 2; .25 INJECTION, SOLUTION INTRAVENOUS at 01:04

## 2023-01-01 RX ADMIN — HEPARIN SODIUM 1600 UNITS: 1000 INJECTION INTRAVENOUS; SUBCUTANEOUS at 13:26

## 2023-01-01 RX ADMIN — APIXABAN 2.5 MG: 2.5 TABLET, FILM COATED ORAL at 09:49

## 2023-01-01 RX ADMIN — FENOFIBRATE 160 MG: 160 TABLET ORAL at 10:17

## 2023-01-01 RX ADMIN — MIDODRINE HYDROCHLORIDE 15 MG: 5 TABLET ORAL at 17:17

## 2023-01-01 RX ADMIN — INSULIN LISPRO 2 UNITS: 100 INJECTION, SOLUTION INTRAVENOUS; SUBCUTANEOUS at 16:24

## 2023-01-01 RX ADMIN — APIXABAN 2.5 MG: 2.5 TABLET, FILM COATED ORAL at 21:59

## 2023-01-01 RX ADMIN — APIXABAN 2.5 MG: 2.5 TABLET, FILM COATED ORAL at 10:16

## 2023-01-01 RX ADMIN — APIXABAN 2.5 MG: 2.5 TABLET, FILM COATED ORAL at 21:38

## 2023-01-01 RX ADMIN — MIDODRINE HYDROCHLORIDE 15 MG: 5 TABLET ORAL at 18:17

## 2023-01-01 RX ADMIN — CALCIUM ACETATE 667 MG: 667 CAPSULE ORAL at 17:07

## 2023-01-01 RX ADMIN — GABAPENTIN 300 MG: 300 CAPSULE ORAL at 12:25

## 2023-01-01 RX ADMIN — VANCOMYCIN HYDROCHLORIDE 125 MG: 125 CAPSULE ORAL at 06:55

## 2023-01-01 RX ADMIN — METOPROLOL SUCCINATE 12.5 MG: 25 TABLET, EXTENDED RELEASE ORAL at 09:03

## 2023-01-01 RX ADMIN — IPRATROPIUM BROMIDE AND ALBUTEROL SULFATE 3 ML: 2.5; .5 SOLUTION RESPIRATORY (INHALATION) at 07:55

## 2023-01-01 RX ADMIN — HEPARIN SODIUM 1600 UNITS: 1000 INJECTION INTRAVENOUS; SUBCUTANEOUS at 11:59

## 2023-01-01 RX ADMIN — MIDODRINE HYDROCHLORIDE 15 MG: 5 TABLET ORAL at 16:48

## 2023-01-01 RX ADMIN — DOXYCYCLINE HYCLATE 100 MG: 100 CAPSULE ORAL at 09:00

## 2023-01-01 RX ADMIN — IPRATROPIUM BROMIDE AND ALBUTEROL SULFATE 3 ML: 2.5; .5 SOLUTION RESPIRATORY (INHALATION) at 12:00

## 2023-01-01 RX ADMIN — SIMVASTATIN 40 MG: 40 TABLET, FILM COATED ORAL at 21:28

## 2023-01-01 RX ADMIN — APIXABAN 2.5 MG: 2.5 TABLET, FILM COATED ORAL at 20:22

## 2023-01-01 RX ADMIN — MIDODRINE HYDROCHLORIDE 15 MG: 5 TABLET ORAL at 08:46

## 2023-01-01 RX ADMIN — DOXYCYCLINE HYCLATE 100 MG: 100 CAPSULE ORAL at 08:38

## 2023-01-01 RX ADMIN — EPOETIN ALFA-EPBX 10000 UNITS: 10000 INJECTION, SOLUTION INTRAVENOUS; SUBCUTANEOUS at 12:33

## 2023-01-01 RX ADMIN — APIXABAN 2.5 MG: 2.5 TABLET, FILM COATED ORAL at 20:13

## 2023-01-01 RX ADMIN — DOXYCYCLINE HYCLATE 100 MG: 100 CAPSULE ORAL at 08:53

## 2023-01-01 RX ADMIN — MIDODRINE HYDROCHLORIDE 15 MG: 5 TABLET ORAL at 16:27

## 2023-01-01 RX ADMIN — SIMVASTATIN 40 MG: 40 TABLET, FILM COATED ORAL at 21:46

## 2023-01-01 RX ADMIN — FEBUXOSTAT 40 MG: 40 TABLET ORAL at 11:33

## 2023-01-01 RX ADMIN — VANCOMYCIN HYDROCHLORIDE 125 MG: 125 CAPSULE ORAL at 12:44

## 2023-01-01 RX ADMIN — NYSTATIN 1 APPLICATION: 100000 POWDER TOPICAL at 20:02

## 2023-01-01 RX ADMIN — MIDODRINE HYDROCHLORIDE 15 MG: 5 TABLET ORAL at 11:32

## 2023-01-01 RX ADMIN — INSULIN LISPRO 1 UNITS: 100 INJECTION, SOLUTION INTRAVENOUS; SUBCUTANEOUS at 08:27

## 2023-01-01 RX ADMIN — NYSTATIN 1 APPLICATION: 100000 POWDER TOPICAL at 09:02

## 2023-01-01 RX ADMIN — APIXABAN 2.5 MG: 2.5 TABLET, FILM COATED ORAL at 23:20

## 2023-01-01 RX ADMIN — CALCIUM ACETATE 667 MG: 667 CAPSULE ORAL at 13:10

## 2023-01-01 RX ADMIN — HYDROCORTISONE SODIUM SUCCINATE 100 MG: 100 INJECTION, POWDER, FOR SOLUTION INTRAMUSCULAR; INTRAVENOUS at 13:55

## 2023-01-01 RX ADMIN — FIDAXOMICIN 200 MG: 200 TABLET, FILM COATED ORAL at 20:27

## 2023-01-01 RX ADMIN — MIDODRINE HYDROCHLORIDE 15 MG: 5 TABLET ORAL at 12:38

## 2023-01-01 RX ADMIN — MIDODRINE HYDROCHLORIDE 15 MG: 5 TABLET ORAL at 07:31

## 2023-01-01 RX ADMIN — VANCOMYCIN HYDROCHLORIDE 125 MG: 125 CAPSULE ORAL at 16:18

## 2023-01-01 RX ADMIN — POTASSIUM CHLORIDE 20 MEQ: 14.9 INJECTION, SOLUTION INTRAVENOUS at 08:42

## 2023-01-01 RX ADMIN — FENOFIBRATE 160 MG: 160 TABLET ORAL at 09:57

## 2023-01-01 RX ADMIN — IPRATROPIUM BROMIDE AND ALBUTEROL SULFATE 3 ML: 2.5; .5 SOLUTION RESPIRATORY (INHALATION) at 20:00

## 2023-01-01 RX ADMIN — MIDODRINE HYDROCHLORIDE 15 MG: 5 TABLET ORAL at 12:52

## 2023-01-01 RX ADMIN — VANCOMYCIN HYDROCHLORIDE 125 MG: 125 CAPSULE ORAL at 21:28

## 2023-01-01 RX ADMIN — MIDODRINE HYDROCHLORIDE 15 MG: 5 TABLET ORAL at 17:09

## 2023-01-01 RX ADMIN — MIDODRINE HYDROCHLORIDE 15 MG: 5 TABLET ORAL at 08:20

## 2023-01-01 RX ADMIN — SIMVASTATIN 40 MG: 40 TABLET, FILM COATED ORAL at 20:38

## 2023-01-01 RX ADMIN — HEPARIN SODIUM 1600 UNITS: 1000 INJECTION INTRAVENOUS; SUBCUTANEOUS at 13:20

## 2023-01-01 RX ADMIN — FIDAXOMICIN 200 MG: 200 TABLET, FILM COATED ORAL at 21:46

## 2023-01-01 RX ADMIN — NOREPINEPHRINE BITARTRATE 0.04 MCG/KG/MIN: 8 INJECTION, SOLUTION INTRAVENOUS at 05:49

## 2023-01-01 RX ADMIN — GABAPENTIN 100 MG: 100 CAPSULE ORAL at 21:44

## 2023-01-01 RX ADMIN — APIXABAN 2.5 MG: 2.5 TABLET, FILM COATED ORAL at 09:44

## 2023-01-01 RX ADMIN — FENOFIBRATE 160 MG: 160 TABLET ORAL at 15:40

## 2023-01-01 RX ADMIN — CALCIUM ACETATE 667 MG: 667 CAPSULE ORAL at 09:03

## 2023-01-01 RX ADMIN — CALCIUM ACETATE 667 MG: 667 CAPSULE ORAL at 12:53

## 2023-01-01 RX ADMIN — IPRATROPIUM BROMIDE AND ALBUTEROL SULFATE 3 ML: 2.5; .5 SOLUTION RESPIRATORY (INHALATION) at 12:21

## 2023-01-01 RX ADMIN — FEBUXOSTAT 40 MG: 40 TABLET ORAL at 09:48

## 2023-01-01 RX ADMIN — ESCITALOPRAM OXALATE 10 MG: 10 TABLET ORAL at 10:17

## 2023-01-01 RX ADMIN — CALCIUM ACETATE 667 MG: 667 CAPSULE ORAL at 12:32

## 2023-01-01 RX ADMIN — CALCIUM ACETATE 667 MG: 667 CAPSULE ORAL at 16:13

## 2023-01-01 RX ADMIN — CALCIUM ACETATE 667 MG: 667 CAPSULE ORAL at 14:15

## 2023-01-01 RX ADMIN — ESCITALOPRAM OXALATE 10 MG: 10 TABLET ORAL at 08:01

## 2023-01-01 RX ADMIN — VANCOMYCIN HYDROCHLORIDE 125 MG: 125 CAPSULE ORAL at 16:13

## 2023-01-01 RX ADMIN — VANCOMYCIN HYDROCHLORIDE 125 MG: 125 CAPSULE ORAL at 21:44

## 2023-01-01 RX ADMIN — VANCOMYCIN HYDROCHLORIDE 125 MG: 125 CAPSULE ORAL at 12:00

## 2023-01-01 RX ADMIN — MIDODRINE HYDROCHLORIDE 15 MG: 5 TABLET ORAL at 18:05

## 2023-01-01 RX ADMIN — NYSTATIN 1 APPLICATION: 100000 POWDER TOPICAL at 20:39

## 2023-01-01 RX ADMIN — NYSTATIN 1 APPLICATION: 100000 POWDER TOPICAL at 21:03

## 2023-01-01 RX ADMIN — SIMVASTATIN 40 MG: 40 TABLET, FILM COATED ORAL at 21:36

## 2023-01-01 RX ADMIN — MIDODRINE HYDROCHLORIDE 15 MG: 5 TABLET ORAL at 16:17

## 2023-01-01 RX ADMIN — FENOFIBRATE 160 MG: 160 TABLET ORAL at 09:21

## 2023-01-01 RX ADMIN — GABAPENTIN 100 MG: 100 CAPSULE ORAL at 20:13

## 2023-01-01 RX ADMIN — Medication: at 20:55

## 2023-01-01 RX ADMIN — ESCITALOPRAM OXALATE 10 MG: 10 TABLET ORAL at 08:15

## 2023-01-01 RX ADMIN — VANCOMYCIN HYDROCHLORIDE 125 MG: 125 CAPSULE ORAL at 18:08

## 2023-01-01 RX ADMIN — FEBUXOSTAT 40 MG: 40 TABLET ORAL at 10:07

## 2023-01-01 RX ADMIN — FENOFIBRATE 160 MG: 160 TABLET ORAL at 09:02

## 2023-01-01 RX ADMIN — PIPERACILLIN SODIUM AND TAZOBACTAM SODIUM 2.25 G: 2; .25 INJECTION, SOLUTION INTRAVENOUS at 16:19

## 2023-01-01 RX ADMIN — FENOFIBRATE 160 MG: 160 TABLET ORAL at 08:53

## 2023-01-01 RX ADMIN — FENOFIBRATE 160 MG: 160 TABLET ORAL at 09:43

## 2023-01-01 RX ADMIN — VANCOMYCIN HYDROCHLORIDE 125 MG: 125 CAPSULE ORAL at 06:26

## 2023-01-01 RX ADMIN — FENOFIBRATE 160 MG: 160 TABLET ORAL at 09:38

## 2023-01-01 RX ADMIN — APIXABAN 2.5 MG: 2.5 TABLET, FILM COATED ORAL at 13:27

## 2023-01-01 RX ADMIN — APIXABAN 2.5 MG: 2.5 TABLET, FILM COATED ORAL at 21:21

## 2023-01-01 RX ADMIN — ESCITALOPRAM OXALATE 10 MG: 10 TABLET ORAL at 09:02

## 2023-01-01 RX ADMIN — FENOFIBRATE 160 MG: 160 TABLET ORAL at 09:49

## 2023-01-01 RX ADMIN — NYSTATIN 1 APPLICATION: 100000 POWDER TOPICAL at 21:45

## 2023-01-01 RX ADMIN — COLLAGENASE SANTYL: 250 OINTMENT TOPICAL at 09:45

## 2023-01-01 RX ADMIN — APIXABAN 2.5 MG: 2.5 TABLET, FILM COATED ORAL at 21:01

## 2023-01-01 RX ADMIN — FIDAXOMICIN 200 MG: 200 TABLET, FILM COATED ORAL at 08:37

## 2023-01-01 RX ADMIN — GABAPENTIN 100 MG: 100 CAPSULE ORAL at 08:19

## 2023-01-01 RX ADMIN — FIDAXOMICIN 200 MG: 200 TABLET, FILM COATED ORAL at 08:15

## 2023-01-01 RX ADMIN — CALCIUM ACETATE 667 MG: 667 CAPSULE ORAL at 17:17

## 2023-01-01 RX ADMIN — MIDODRINE HYDROCHLORIDE 15 MG: 5 TABLET ORAL at 10:14

## 2023-01-01 RX ADMIN — MIDODRINE HYDROCHLORIDE 15 MG: 5 TABLET ORAL at 09:43

## 2023-01-01 RX ADMIN — VANCOMYCIN HYDROCHLORIDE 125 MG: 125 CAPSULE ORAL at 18:06

## 2023-01-01 RX ADMIN — HYDROCORTISONE SODIUM SUCCINATE 100 MG: 100 INJECTION, POWDER, FOR SOLUTION INTRAMUSCULAR; INTRAVENOUS at 13:44

## 2023-01-01 RX ADMIN — NYSTATIN 1 APPLICATION: 100000 POWDER TOPICAL at 09:46

## 2023-01-01 RX ADMIN — APIXABAN 2.5 MG: 2.5 TABLET, FILM COATED ORAL at 20:02

## 2023-01-01 RX ADMIN — MIDODRINE HYDROCHLORIDE 15 MG: 5 TABLET ORAL at 13:10

## 2023-01-01 RX ADMIN — VANCOMYCIN HYDROCHLORIDE 125 MG: 125 CAPSULE ORAL at 12:18

## 2023-01-01 RX ADMIN — Medication: at 20:05

## 2023-01-01 RX ADMIN — NYSTATIN 1 APPLICATION: 100000 POWDER TOPICAL at 21:42

## 2023-01-01 RX ADMIN — CALCIUM ACETATE 667 MG: 667 CAPSULE ORAL at 17:35

## 2023-01-01 RX ADMIN — MIDODRINE HYDROCHLORIDE 15 MG: 5 TABLET ORAL at 13:45

## 2023-01-01 RX ADMIN — INSULIN LISPRO 2 UNITS: 100 INJECTION, SOLUTION INTRAVENOUS; SUBCUTANEOUS at 16:07

## 2023-01-01 RX ADMIN — INSULIN LISPRO 1 UNITS: 100 INJECTION, SOLUTION INTRAVENOUS; SUBCUTANEOUS at 18:00

## 2023-01-01 RX ADMIN — HEPARIN SODIUM 1600 UNITS: 1000 INJECTION INTRAVENOUS; SUBCUTANEOUS at 13:29

## 2023-01-01 RX ADMIN — IPRATROPIUM BROMIDE AND ALBUTEROL SULFATE 3 ML: 2.5; .5 SOLUTION RESPIRATORY (INHALATION) at 11:58

## 2023-01-01 RX ADMIN — VANCOMYCIN HYDROCHLORIDE 125 MG: 125 CAPSULE ORAL at 17:38

## 2023-01-01 RX ADMIN — NYSTATIN 1 APPLICATION: 100000 POWDER TOPICAL at 23:26

## 2023-01-01 RX ADMIN — VANCOMYCIN HYDROCHLORIDE 125 MG: 125 CAPSULE ORAL at 18:37

## 2023-01-01 RX ADMIN — INSULIN LISPRO 1 UNITS: 100 INJECTION, SOLUTION INTRAVENOUS; SUBCUTANEOUS at 12:15

## 2023-01-01 RX ADMIN — GABAPENTIN 100 MG: 100 CAPSULE ORAL at 21:07

## 2023-01-01 RX ADMIN — MIDODRINE HYDROCHLORIDE 15 MG: 5 TABLET ORAL at 08:17

## 2023-01-01 RX ADMIN — GABAPENTIN 100 MG: 100 CAPSULE ORAL at 21:01

## 2023-01-01 RX ADMIN — GABAPENTIN 100 MG: 100 CAPSULE ORAL at 09:49

## 2023-01-01 RX ADMIN — CALCIUM ACETATE 667 MG: 667 CAPSULE ORAL at 13:26

## 2023-01-01 RX ADMIN — FENOFIBRATE 160 MG: 160 TABLET ORAL at 09:04

## 2023-01-01 RX ADMIN — ESCITALOPRAM OXALATE 10 MG: 10 TABLET ORAL at 09:00

## 2023-01-01 RX ADMIN — VANCOMYCIN HYDROCHLORIDE 125 MG: 125 CAPSULE ORAL at 06:14

## 2023-01-01 RX ADMIN — DOXYCYCLINE HYCLATE 100 MG: 100 CAPSULE ORAL at 09:21

## 2023-01-01 RX ADMIN — GABAPENTIN 100 MG: 100 CAPSULE ORAL at 21:22

## 2023-01-01 RX ADMIN — MIDODRINE HYDROCHLORIDE 15 MG: 5 TABLET ORAL at 12:32

## 2023-01-01 RX ADMIN — MIDODRINE HYDROCHLORIDE 15 MG: 5 TABLET ORAL at 10:17

## 2023-01-01 RX ADMIN — VANCOMYCIN HYDROCHLORIDE 125 MG: 125 CAPSULE ORAL at 13:27

## 2023-01-01 RX ADMIN — APIXABAN 2.5 MG: 2.5 TABLET, FILM COATED ORAL at 21:39

## 2023-01-01 RX ADMIN — VANCOMYCIN HYDROCHLORIDE 125 MG: 125 CAPSULE ORAL at 06:02

## 2023-01-01 RX ADMIN — SIMVASTATIN 40 MG: 40 TABLET, FILM COATED ORAL at 20:53

## 2023-01-01 RX ADMIN — DOXYCYCLINE HYCLATE 100 MG: 100 CAPSULE ORAL at 08:25

## 2023-01-01 RX ADMIN — VANCOMYCIN HYDROCHLORIDE 125 MG: 125 CAPSULE ORAL at 21:45

## 2023-01-01 RX ADMIN — MIDODRINE HYDROCHLORIDE 15 MG: 5 TABLET ORAL at 17:00

## 2023-01-01 RX ADMIN — CALCIUM ACETATE 667 MG: 667 CAPSULE ORAL at 12:29

## 2023-01-01 RX ADMIN — GABAPENTIN 300 MG: 300 CAPSULE ORAL at 09:04

## 2023-01-01 RX ADMIN — COLLAGENASE SANTYL 1 APPLICATION: 250 OINTMENT TOPICAL at 08:53

## 2023-01-01 RX ADMIN — NYSTATIN 1 APPLICATION: 100000 POWDER TOPICAL at 15:37

## 2023-01-01 RX ADMIN — NYSTATIN 1 APPLICATION: 100000 POWDER TOPICAL at 21:28

## 2023-01-01 RX ADMIN — PIPERACILLIN SODIUM AND TAZOBACTAM SODIUM 2.25 G: 2; .25 INJECTION, SOLUTION INTRAVENOUS at 09:42

## 2023-01-01 RX ADMIN — VANCOMYCIN HYDROCHLORIDE 125 MG: 125 CAPSULE ORAL at 22:33

## 2023-01-01 RX ADMIN — ALBUMIN (HUMAN) 12.5 G: 5 SOLUTION INTRAVENOUS at 14:15

## 2023-01-01 RX ADMIN — VANCOMYCIN HYDROCHLORIDE 125 MG: 125 CAPSULE ORAL at 23:21

## 2023-01-01 RX ADMIN — GABAPENTIN 100 MG: 100 CAPSULE ORAL at 09:38

## 2023-01-01 RX ADMIN — ALBUMIN (HUMAN) 12.5 G: 5 SOLUTION INTRAVENOUS at 16:26

## 2023-01-01 RX ADMIN — APIXABAN 2.5 MG: 2.5 TABLET, FILM COATED ORAL at 09:22

## 2023-01-01 RX ADMIN — MIDODRINE HYDROCHLORIDE 15 MG: 5 TABLET ORAL at 08:39

## 2023-01-01 RX ADMIN — VANCOMYCIN HYDROCHLORIDE 125 MG: 125 CAPSULE ORAL at 17:22

## 2023-01-01 RX ADMIN — VANCOMYCIN 1 G: 1 INJECTION, SOLUTION INTRAVENOUS at 16:40

## 2023-01-01 RX ADMIN — IPRATROPIUM BROMIDE AND ALBUTEROL SULFATE 3 ML: 2.5; .5 SOLUTION RESPIRATORY (INHALATION) at 06:50

## 2023-01-01 RX ADMIN — GABAPENTIN 100 MG: 100 CAPSULE ORAL at 08:01

## 2023-01-01 RX ADMIN — CALCIUM ACETATE 667 MG: 667 CAPSULE ORAL at 08:50

## 2023-01-01 RX ADMIN — APIXABAN 2.5 MG: 2.5 TABLET, FILM COATED ORAL at 21:28

## 2023-01-01 RX ADMIN — FIDAXOMICIN 200 MG: 200 TABLET, FILM COATED ORAL at 09:47

## 2023-01-01 RX ADMIN — VANCOMYCIN HYDROCHLORIDE 125 MG: 125 CAPSULE ORAL at 21:42

## 2023-01-01 RX ADMIN — MIDODRINE HYDROCHLORIDE 15 MG: 5 TABLET ORAL at 13:18

## 2023-01-01 RX ADMIN — CALCIUM ACETATE 667 MG: 667 CAPSULE ORAL at 18:38

## 2023-01-01 RX ADMIN — ESCITALOPRAM OXALATE 10 MG: 10 TABLET ORAL at 08:51

## 2023-01-01 RX ADMIN — CALCIUM ACETATE 667 MG: 667 CAPSULE ORAL at 10:18

## 2023-01-01 RX ADMIN — NYSTATIN 1 APPLICATION: 100000 POWDER TOPICAL at 20:27

## 2023-01-01 RX ADMIN — METOPROLOL SUCCINATE 12.5 MG: 25 TABLET, EXTENDED RELEASE ORAL at 10:24

## 2023-01-01 RX ADMIN — GABAPENTIN 100 MG: 100 CAPSULE ORAL at 21:42

## 2023-01-01 RX ADMIN — GABAPENTIN 100 MG: 100 CAPSULE ORAL at 21:11

## 2023-01-01 RX ADMIN — NYSTATIN 1 APPLICATION: 100000 POWDER TOPICAL at 08:51

## 2023-01-01 RX ADMIN — DOXYCYCLINE HYCLATE 100 MG: 100 CAPSULE ORAL at 13:27

## 2023-01-01 RX ADMIN — SIMVASTATIN 40 MG: 40 TABLET, FILM COATED ORAL at 21:44

## 2023-01-01 RX ADMIN — APIXABAN 2.5 MG: 2.5 TABLET, FILM COATED ORAL at 09:21

## 2023-01-01 RX ADMIN — COLLAGENASE SANTYL: 250 OINTMENT TOPICAL at 15:38

## 2023-01-01 RX ADMIN — CALCIUM ACETATE 667 MG: 667 CAPSULE ORAL at 12:08

## 2023-01-01 RX ADMIN — ESCITALOPRAM OXALATE 10 MG: 10 TABLET ORAL at 10:07

## 2023-01-01 RX ADMIN — VANCOMYCIN HYDROCHLORIDE 125 MG: 125 CAPSULE ORAL at 13:55

## 2023-01-01 RX ADMIN — PIPERACILLIN SODIUM AND TAZOBACTAM SODIUM 2.25 G: 2; .25 INJECTION, SOLUTION INTRAVENOUS at 20:11

## 2023-01-01 RX ADMIN — APIXABAN 2.5 MG: 2.5 TABLET, FILM COATED ORAL at 21:23

## 2023-01-01 RX ADMIN — INSULIN LISPRO 4 UNITS: 100 INJECTION, SOLUTION INTRAVENOUS; SUBCUTANEOUS at 08:43

## 2023-01-01 RX ADMIN — MIDODRINE HYDROCHLORIDE 15 MG: 5 TABLET ORAL at 12:53

## 2023-01-01 RX ADMIN — IPRATROPIUM BROMIDE AND ALBUTEROL SULFATE 3 ML: 2.5; .5 SOLUTION RESPIRATORY (INHALATION) at 12:55

## 2023-01-01 RX ADMIN — MIDODRINE HYDROCHLORIDE 15 MG: 5 TABLET ORAL at 08:50

## 2023-01-01 RX ADMIN — PIPERACILLIN SODIUM AND TAZOBACTAM SODIUM 2.25 G: 2; .25 INJECTION, SOLUTION INTRAVENOUS at 21:06

## 2023-01-01 RX ADMIN — VANCOMYCIN HYDROCHLORIDE 125 MG: 125 CAPSULE ORAL at 21:21

## 2023-01-01 RX ADMIN — PIPERACILLIN SODIUM AND TAZOBACTAM SODIUM 2.25 G: 2; .25 INJECTION, SOLUTION INTRAVENOUS at 20:41

## 2023-01-01 RX ADMIN — ESCITALOPRAM OXALATE 10 MG: 10 TABLET ORAL at 13:26

## 2023-01-01 RX ADMIN — MIDODRINE HYDROCHLORIDE 15 MG: 5 TABLET ORAL at 09:22

## 2023-01-01 RX ADMIN — Medication: at 20:50

## 2023-01-01 RX ADMIN — PIPERACILLIN SODIUM AND TAZOBACTAM SODIUM 2.25 G: 2; .25 INJECTION, SOLUTION INTRAVENOUS at 14:26

## 2023-01-01 RX ADMIN — GABAPENTIN 300 MG: 300 CAPSULE ORAL at 21:36

## 2023-01-01 RX ADMIN — NYSTATIN 1 APPLICATION: 100000 POWDER TOPICAL at 20:53

## 2023-01-01 RX ADMIN — ESCITALOPRAM OXALATE 10 MG: 10 TABLET ORAL at 12:22

## 2023-01-01 RX ADMIN — GABAPENTIN 300 MG: 300 CAPSULE ORAL at 21:59

## 2023-01-01 RX ADMIN — MIDODRINE HYDROCHLORIDE 15 MG: 5 TABLET ORAL at 16:13

## 2023-01-01 RX ADMIN — VANCOMYCIN HYDROCHLORIDE 500 MG: 250 CAPSULE ORAL at 20:49

## 2023-01-01 RX ADMIN — CALCIUM ACETATE 667 MG: 667 CAPSULE ORAL at 16:35

## 2023-01-01 RX ADMIN — FENOFIBRATE 160 MG: 160 TABLET ORAL at 08:19

## 2023-01-01 RX ADMIN — VANCOMYCIN 1750 MG: 1.25 INJECTION, SOLUTION INTRAVENOUS at 17:59

## 2023-01-01 RX ADMIN — CALCIUM ACETATE 667 MG: 667 CAPSULE ORAL at 08:28

## 2023-01-01 RX ADMIN — DOXYCYCLINE HYCLATE 100 MG: 100 CAPSULE ORAL at 16:13

## 2023-01-01 RX ADMIN — CALCIUM ACETATE 667 MG: 667 CAPSULE ORAL at 16:27

## 2023-01-01 RX ADMIN — GABAPENTIN 300 MG: 300 CAPSULE ORAL at 21:45

## 2023-01-01 RX ADMIN — NYSTATIN 1 APPLICATION: 100000 POWDER TOPICAL at 13:40

## 2023-01-01 RX ADMIN — GABAPENTIN 100 MG: 100 CAPSULE ORAL at 08:15

## 2023-01-01 RX ADMIN — VANCOMYCIN HYDROCHLORIDE 125 MG: 125 CAPSULE ORAL at 21:36

## 2023-01-01 RX ADMIN — NYSTATIN 1 APPLICATION: 100000 POWDER TOPICAL at 08:01

## 2023-01-01 RX ADMIN — SIMVASTATIN 40 MG: 40 TABLET, FILM COATED ORAL at 21:07

## 2023-01-01 RX ADMIN — VANCOMYCIN HYDROCHLORIDE 125 MG: 125 CAPSULE ORAL at 13:03

## 2023-01-01 RX ADMIN — IPRATROPIUM BROMIDE AND ALBUTEROL SULFATE 3 ML: 2.5; .5 SOLUTION RESPIRATORY (INHALATION) at 11:21

## 2023-01-01 RX ADMIN — VANCOMYCIN HYDROCHLORIDE 125 MG: 125 CAPSULE ORAL at 18:17

## 2023-01-01 RX ADMIN — CALCIUM ACETATE 667 MG: 667 CAPSULE ORAL at 12:42

## 2023-01-01 RX ADMIN — INSULIN LISPRO 1 UNITS: 100 INJECTION, SOLUTION INTRAVENOUS; SUBCUTANEOUS at 17:09

## 2023-01-01 RX ADMIN — CALCIUM ACETATE 667 MG: 667 CAPSULE ORAL at 08:26

## 2023-01-01 RX ADMIN — APIXABAN 2.5 MG: 2.5 TABLET, FILM COATED ORAL at 20:53

## 2023-01-01 RX ADMIN — PIPERACILLIN SODIUM AND TAZOBACTAM SODIUM 2.25 G: 2; .25 INJECTION, SOLUTION INTRAVENOUS at 07:49

## 2023-01-01 RX ADMIN — IPRATROPIUM BROMIDE AND ALBUTEROL SULFATE 3 ML: 2.5; .5 SOLUTION RESPIRATORY (INHALATION) at 07:21

## 2023-01-01 RX ADMIN — COLLAGENASE SANTYL 1 APPLICATION: 250 OINTMENT TOPICAL at 08:56

## 2023-01-01 RX ADMIN — ALBUMIN (HUMAN) 25 G: 12.5 INJECTION, SOLUTION INTRAVENOUS at 02:58

## 2023-01-01 SDOH — SOCIAL STABILITY: SOCIAL INSECURITY: ARE YOU OR HAVE YOU BEEN THREATENED OR ABUSED PHYSICALLY, EMOTIONALLY, OR SEXUALLY BY ANYONE?: NO

## 2023-01-01 SDOH — SOCIAL STABILITY: SOCIAL INSECURITY: ABUSE: ADULT

## 2023-01-01 SDOH — SOCIAL STABILITY: SOCIAL INSECURITY: HAS ANYONE EVER THREATENED TO HURT YOUR FAMILY OR YOUR PETS?: NO

## 2023-01-01 SDOH — SOCIAL STABILITY: SOCIAL INSECURITY: HAVE YOU HAD THOUGHTS OF HARMING ANYONE ELSE?: NO

## 2023-01-01 SDOH — ECONOMIC STABILITY: FOOD INSECURITY: MEALS PER DAY: 2

## 2023-01-01 SDOH — HEALTH STABILITY: PHYSICAL HEALTH
EXERCISE COMMENTS: PATIENT COMPLETED THE FOLLOWING EXERCISES: SUPINE ANKLE PUMPS, QUAD SETS, GLUTEAL SETS, HEEL SLIDES, SLR X 20. SEATED LAQ, HIP FLEXION X 10 EACH

## 2023-01-01 SDOH — SOCIAL STABILITY: SOCIAL INSECURITY: ARE THERE ANY APPARENT SIGNS OF INJURIES/BEHAVIORS THAT COULD BE RELATED TO ABUSE/NEGLECT?: NO

## 2023-01-01 SDOH — SOCIAL STABILITY: SOCIAL INSECURITY: DO YOU FEEL ANYONE HAS EXPLOITED OR TAKEN ADVANTAGE OF YOU FINANCIALLY OR OF YOUR PERSONAL PROPERTY?: NO

## 2023-01-01 SDOH — SOCIAL STABILITY: SOCIAL INSECURITY: WERE YOU ABLE TO COMPLETE ALL THE BEHAVIORAL HEALTH SCREENINGS?: YES

## 2023-01-01 SDOH — SOCIAL STABILITY: SOCIAL INSECURITY: DOES ANYONE TRY TO KEEP YOU FROM HAVING/CONTACTING OTHER FRIENDS OR DOING THINGS OUTSIDE YOUR HOME?: NO

## 2023-01-01 SDOH — SOCIAL STABILITY: SOCIAL INSECURITY: DO YOU FEEL UNSAFE GOING BACK TO THE PLACE WHERE YOU ARE LIVING?: NO

## 2023-01-01 ASSESSMENT — COGNITIVE AND FUNCTIONAL STATUS - GENERAL
DAILY ACTIVITIY SCORE: 10
MOVING FROM LYING ON BACK TO SITTING ON SIDE OF FLAT BED WITH BEDRAILS: A LOT
EATING MEALS: A LITTLE
TURNING FROM BACK TO SIDE WHILE IN FLAT BAD: TOTAL
DRESSING REGULAR UPPER BODY CLOTHING: TOTAL
CLIMB 3 TO 5 STEPS WITH RAILING: TOTAL
DAILY ACTIVITIY SCORE: 14
MOVING FROM LYING ON BACK TO SITTING ON SIDE OF FLAT BED WITH BEDRAILS: A LOT
STANDING UP FROM CHAIR USING ARMS: TOTAL
TURNING FROM BACK TO SIDE WHILE IN FLAT BAD: A LOT
CLIMB 3 TO 5 STEPS WITH RAILING: TOTAL
MOVING TO AND FROM BED TO CHAIR: TOTAL
DRESSING REGULAR LOWER BODY CLOTHING: TOTAL
CLIMB 3 TO 5 STEPS WITH RAILING: TOTAL
DRESSING REGULAR UPPER BODY CLOTHING: TOTAL
DRESSING REGULAR UPPER BODY CLOTHING: A LOT
DRESSING REGULAR UPPER BODY CLOTHING: TOTAL
TURNING FROM BACK TO SIDE WHILE IN FLAT BAD: TOTAL
MOBILITY SCORE: 7
DAILY ACTIVITIY SCORE: 9
STANDING UP FROM CHAIR USING ARMS: TOTAL
EATING MEALS: A LITTLE
DAILY ACTIVITIY SCORE: 8
MOVING TO AND FROM BED TO CHAIR: TOTAL
PERSONAL GROOMING: TOTAL
MOVING FROM LYING ON BACK TO SITTING ON SIDE OF FLAT BED WITH BEDRAILS: A LOT
PERSONAL GROOMING: A LOT
DRESSING REGULAR UPPER BODY CLOTHING: TOTAL
DAILY ACTIVITIY SCORE: 6
CLIMB 3 TO 5 STEPS WITH RAILING: A LOT
PERSONAL GROOMING: TOTAL
MOBILITY SCORE: 6
EATING MEALS: A LOT
HELP NEEDED FOR BATHING: TOTAL
DRESSING REGULAR LOWER BODY CLOTHING: TOTAL
MOVING FROM LYING ON BACK TO SITTING ON SIDE OF FLAT BED WITH BEDRAILS: A LOT
TOILETING: TOTAL
CLIMB 3 TO 5 STEPS WITH RAILING: TOTAL
PERSONAL GROOMING: A LITTLE
DRESSING REGULAR LOWER BODY CLOTHING: TOTAL
TOILETING: TOTAL
MOBILITY SCORE: 8
DRESSING REGULAR LOWER BODY CLOTHING: TOTAL
PERSONAL GROOMING: TOTAL
CLIMB 3 TO 5 STEPS WITH RAILING: TOTAL
DRESSING REGULAR LOWER BODY CLOTHING: TOTAL
MOVING FROM LYING ON BACK TO SITTING ON SIDE OF FLAT BED WITH BEDRAILS: TOTAL
MOVING TO AND FROM BED TO CHAIR: TOTAL
HELP NEEDED FOR BATHING: TOTAL
MOBILITY SCORE: 15
WALKING IN HOSPITAL ROOM: TOTAL
MOVING TO AND FROM BED TO CHAIR: TOTAL
CLIMB 3 TO 5 STEPS WITH RAILING: TOTAL
EATING MEALS: A LOT
MOBILITY SCORE: 8
DRESSING REGULAR LOWER BODY CLOTHING: A LOT
DRESSING REGULAR LOWER BODY CLOTHING: TOTAL
MOBILITY SCORE: 6
WALKING IN HOSPITAL ROOM: TOTAL
MOBILITY SCORE: 7
STANDING UP FROM CHAIR USING ARMS: TOTAL
WALKING IN HOSPITAL ROOM: TOTAL
DAILY ACTIVITIY SCORE: 8
HELP NEEDED FOR BATHING: TOTAL
MOVING TO AND FROM BED TO CHAIR: TOTAL
DAILY ACTIVITIY SCORE: 10
STANDING UP FROM CHAIR USING ARMS: TOTAL
DRESSING REGULAR UPPER BODY CLOTHING: TOTAL
TURNING FROM BACK TO SIDE WHILE IN FLAT BAD: TOTAL
TURNING FROM BACK TO SIDE WHILE IN FLAT BAD: A LOT
TOILETING: TOTAL
MOVING FROM LYING ON BACK TO SITTING ON SIDE OF FLAT BED WITH BEDRAILS: TOTAL
MOVING TO AND FROM BED TO CHAIR: TOTAL
STANDING UP FROM CHAIR USING ARMS: TOTAL
EATING MEALS: A LITTLE
PERSONAL GROOMING: TOTAL
PERSONAL GROOMING: TOTAL
DRESSING REGULAR UPPER BODY CLOTHING: TOTAL
EATING MEALS: A LOT
TURNING FROM BACK TO SIDE WHILE IN FLAT BAD: A LOT
DAILY ACTIVITIY SCORE: 8
HELP NEEDED FOR BATHING: TOTAL
EATING MEALS: A LITTLE
MOVING FROM LYING ON BACK TO SITTING ON SIDE OF FLAT BED WITH BEDRAILS: A LOT
WALKING IN HOSPITAL ROOM: TOTAL
WALKING IN HOSPITAL ROOM: TOTAL
CLIMB 3 TO 5 STEPS WITH RAILING: TOTAL
PERSONAL GROOMING: TOTAL
MOVING TO AND FROM BED TO CHAIR: TOTAL
PERSONAL GROOMING: TOTAL
MOVING FROM LYING ON BACK TO SITTING ON SIDE OF FLAT BED WITH BEDRAILS: A LOT
HELP NEEDED FOR BATHING: A LOT
WALKING IN HOSPITAL ROOM: TOTAL
MOVING TO AND FROM BED TO CHAIR: TOTAL
DRESSING REGULAR UPPER BODY CLOTHING: TOTAL
CLIMB 3 TO 5 STEPS WITH RAILING: TOTAL
STANDING UP FROM CHAIR USING ARMS: TOTAL
TOILETING: TOTAL
MOVING FROM LYING ON BACK TO SITTING ON SIDE OF FLAT BED WITH BEDRAILS: A LOT
DAILY ACTIVITIY SCORE: 9
HELP NEEDED FOR BATHING: TOTAL
HELP NEEDED FOR BATHING: TOTAL
DRESSING REGULAR LOWER BODY CLOTHING: TOTAL
WALKING IN HOSPITAL ROOM: TOTAL
TOILETING: A LOT
TOILETING: TOTAL
MOBILITY SCORE: 7
DRESSING REGULAR LOWER BODY CLOTHING: TOTAL
TOILETING: TOTAL
PERSONAL GROOMING: TOTAL
DRESSING REGULAR UPPER BODY CLOTHING: TOTAL
MOVING TO AND FROM BED TO CHAIR: TOTAL
DRESSING REGULAR LOWER BODY CLOTHING: TOTAL
TOILETING: TOTAL
PERSONAL GROOMING: TOTAL
DAILY ACTIVITIY SCORE: 7
EATING MEALS: A LITTLE
EATING MEALS: TOTAL
MOVING FROM LYING ON BACK TO SITTING ON SIDE OF FLAT BED WITH BEDRAILS: A LOT
STANDING UP FROM CHAIR USING ARMS: TOTAL
WALKING IN HOSPITAL ROOM: TOTAL
TURNING FROM BACK TO SIDE WHILE IN FLAT BAD: A LOT
DAILY ACTIVITIY SCORE: 6
CLIMB 3 TO 5 STEPS WITH RAILING: TOTAL
DRESSING REGULAR UPPER BODY CLOTHING: A LOT
STANDING UP FROM CHAIR USING ARMS: TOTAL
DRESSING REGULAR UPPER BODY CLOTHING: A LOT
EATING MEALS: TOTAL
TOILETING: TOTAL
DAILY ACTIVITIY SCORE: 8
HELP NEEDED FOR BATHING: TOTAL
MOBILITY SCORE: 8
MOVING FROM LYING ON BACK TO SITTING ON SIDE OF FLAT BED WITH BEDRAILS: A LOT
CLIMB 3 TO 5 STEPS WITH RAILING: TOTAL
MOVING TO AND FROM BED TO CHAIR: A LOT
MOVING TO AND FROM BED TO CHAIR: TOTAL
DAILY ACTIVITIY SCORE: 6
MOBILITY SCORE: 7
HELP NEEDED FOR BATHING: TOTAL
EATING MEALS: A LOT
MOBILITY SCORE: 7
TURNING FROM BACK TO SIDE WHILE IN FLAT BAD: TOTAL
HELP NEEDED FOR BATHING: TOTAL
DAILY ACTIVITIY SCORE: 6
DRESSING REGULAR LOWER BODY CLOTHING: TOTAL
DAILY ACTIVITIY SCORE: 10
PERSONAL GROOMING: A LOT
DRESSING REGULAR UPPER BODY CLOTHING: A LOT
DRESSING REGULAR LOWER BODY CLOTHING: TOTAL
MOBILITY SCORE: 7
MOVING FROM LYING ON BACK TO SITTING ON SIDE OF FLAT BED WITH BEDRAILS: TOTAL
STANDING UP FROM CHAIR USING ARMS: TOTAL
MOVING TO AND FROM BED TO CHAIR: TOTAL
MOVING TO AND FROM BED TO CHAIR: TOTAL
MOVING FROM LYING ON BACK TO SITTING ON SIDE OF FLAT BED WITH BEDRAILS: TOTAL
TOILETING: TOTAL
HELP NEEDED FOR BATHING: TOTAL
HELP NEEDED FOR BATHING: TOTAL
CLIMB 3 TO 5 STEPS WITH RAILING: TOTAL
PERSONAL GROOMING: A LOT
MOBILITY SCORE: 6
TOILETING: TOTAL
EATING MEALS: A LOT
EATING MEALS: A LITTLE
MOVING FROM LYING ON BACK TO SITTING ON SIDE OF FLAT BED WITH BEDRAILS: A LOT
EATING MEALS: A LOT
STANDING UP FROM CHAIR USING ARMS: TOTAL
MOVING TO AND FROM BED TO CHAIR: TOTAL
DRESSING REGULAR LOWER BODY CLOTHING: TOTAL
DRESSING REGULAR UPPER BODY CLOTHING: TOTAL
MOVING TO AND FROM BED TO CHAIR: TOTAL
CLIMB 3 TO 5 STEPS WITH RAILING: TOTAL
PATIENT BASELINE BEDBOUND: YES
TURNING FROM BACK TO SIDE WHILE IN FLAT BAD: A LOT
WALKING IN HOSPITAL ROOM: TOTAL
HELP NEEDED FOR BATHING: TOTAL
CLIMB 3 TO 5 STEPS WITH RAILING: TOTAL
TOILETING: TOTAL
WALKING IN HOSPITAL ROOM: TOTAL
HELP NEEDED FOR BATHING: TOTAL
MOBILITY SCORE: 6
STANDING UP FROM CHAIR USING ARMS: TOTAL
DRESSING REGULAR UPPER BODY CLOTHING: TOTAL
DRESSING REGULAR LOWER BODY CLOTHING: TOTAL
DAILY ACTIVITIY SCORE: 14
CLIMB 3 TO 5 STEPS WITH RAILING: TOTAL
TOILETING: TOTAL
TOILETING: TOTAL
EATING MEALS: TOTAL
DRESSING REGULAR LOWER BODY CLOTHING: TOTAL
WALKING IN HOSPITAL ROOM: A LOT
MOBILITY SCORE: 8
EATING MEALS: A LITTLE
DRESSING REGULAR LOWER BODY CLOTHING: TOTAL
TOILETING: TOTAL
MOVING TO AND FROM BED TO CHAIR: TOTAL
TOILETING: TOTAL
HELP NEEDED FOR BATHING: TOTAL
DRESSING REGULAR UPPER BODY CLOTHING: TOTAL
EATING MEALS: TOTAL
TURNING FROM BACK TO SIDE WHILE IN FLAT BAD: A LOT
CLIMB 3 TO 5 STEPS WITH RAILING: TOTAL
PERSONAL GROOMING: TOTAL
MOVING TO AND FROM BED TO CHAIR: A LOT
MOVING TO AND FROM BED TO CHAIR: TOTAL
DRESSING REGULAR UPPER BODY CLOTHING: TOTAL
STANDING UP FROM CHAIR USING ARMS: TOTAL
TURNING FROM BACK TO SIDE WHILE IN FLAT BAD: TOTAL
PERSONAL GROOMING: A LITTLE
PERSONAL GROOMING: A LOT
DRESSING REGULAR UPPER BODY CLOTHING: TOTAL
CLIMB 3 TO 5 STEPS WITH RAILING: TOTAL
EATING MEALS: A LITTLE
HELP NEEDED FOR BATHING: TOTAL
HELP NEEDED FOR BATHING: TOTAL
EATING MEALS: A LOT
DAILY ACTIVITIY SCORE: 8
EATING MEALS: A LITTLE
TURNING FROM BACK TO SIDE WHILE IN FLAT BAD: TOTAL
MOBILITY SCORE: 7
TURNING FROM BACK TO SIDE WHILE IN FLAT BAD: A LOT
CLIMB 3 TO 5 STEPS WITH RAILING: TOTAL
HELP NEEDED FOR BATHING: A LOT
CLIMB 3 TO 5 STEPS WITH RAILING: TOTAL
STANDING UP FROM CHAIR USING ARMS: TOTAL
WALKING IN HOSPITAL ROOM: TOTAL
MOVING FROM LYING ON BACK TO SITTING ON SIDE OF FLAT BED WITH BEDRAILS: A LOT
TURNING FROM BACK TO SIDE WHILE IN FLAT BAD: TOTAL
MOVING FROM LYING ON BACK TO SITTING ON SIDE OF FLAT BED WITH BEDRAILS: A LOT
TOILETING: TOTAL
DRESSING REGULAR LOWER BODY CLOTHING: TOTAL
STANDING UP FROM CHAIR USING ARMS: TOTAL
DAILY ACTIVITIY SCORE: 9
STANDING UP FROM CHAIR USING ARMS: TOTAL
DRESSING REGULAR UPPER BODY CLOTHING: A LOT
MOVING TO AND FROM BED TO CHAIR: TOTAL
MOVING FROM LYING ON BACK TO SITTING ON SIDE OF FLAT BED WITH BEDRAILS: A LOT
WALKING IN HOSPITAL ROOM: TOTAL
MOBILITY SCORE: 9
STANDING UP FROM CHAIR USING ARMS: TOTAL
WALKING IN HOSPITAL ROOM: TOTAL
WALKING IN HOSPITAL ROOM: TOTAL
MOBILITY SCORE: 7
MOVING FROM LYING ON BACK TO SITTING ON SIDE OF FLAT BED WITH BEDRAILS: TOTAL
TOILETING: TOTAL
MOVING TO AND FROM BED TO CHAIR: TOTAL
STANDING UP FROM CHAIR USING ARMS: TOTAL
DRESSING REGULAR UPPER BODY CLOTHING: TOTAL
DAILY ACTIVITIY SCORE: 7
MOBILITY SCORE: 8
DRESSING REGULAR LOWER BODY CLOTHING: TOTAL
TOILETING: A LOT
EATING MEALS: A LITTLE
MOVING TO AND FROM BED TO CHAIR: TOTAL
WALKING IN HOSPITAL ROOM: TOTAL
DRESSING REGULAR UPPER BODY CLOTHING: TOTAL
MOBILITY SCORE: 6
MOVING TO AND FROM BED TO CHAIR: TOTAL
WALKING IN HOSPITAL ROOM: TOTAL
TURNING FROM BACK TO SIDE WHILE IN FLAT BAD: A LOT
STANDING UP FROM CHAIR USING ARMS: TOTAL
CLIMB 3 TO 5 STEPS WITH RAILING: TOTAL
EATING MEALS: TOTAL
PERSONAL GROOMING: TOTAL
DAILY ACTIVITIY SCORE: 7
TURNING FROM BACK TO SIDE WHILE IN FLAT BAD: TOTAL
WALKING IN HOSPITAL ROOM: TOTAL
DRESSING REGULAR UPPER BODY CLOTHING: TOTAL
DRESSING REGULAR LOWER BODY CLOTHING: TOTAL
MOVING FROM LYING ON BACK TO SITTING ON SIDE OF FLAT BED WITH BEDRAILS: A LOT
HELP NEEDED FOR BATHING: TOTAL
TOILETING: TOTAL
PERSONAL GROOMING: TOTAL
PERSONAL GROOMING: A LOT
DRESSING REGULAR LOWER BODY CLOTHING: TOTAL
WALKING IN HOSPITAL ROOM: TOTAL
TOILETING: TOTAL
MOVING TO AND FROM BED TO CHAIR: TOTAL
MOVING FROM LYING ON BACK TO SITTING ON SIDE OF FLAT BED WITH BEDRAILS: A LOT
WALKING IN HOSPITAL ROOM: TOTAL
PERSONAL GROOMING: A LOT
CLIMB 3 TO 5 STEPS WITH RAILING: TOTAL
STANDING UP FROM CHAIR USING ARMS: TOTAL
DAILY ACTIVITIY SCORE: 7
DRESSING REGULAR LOWER BODY CLOTHING: TOTAL
TURNING FROM BACK TO SIDE WHILE IN FLAT BAD: TOTAL
TOILETING: TOTAL
MOBILITY SCORE: 8
MOVING TO AND FROM BED TO CHAIR: TOTAL
MOBILITY SCORE: 6
MOVING FROM LYING ON BACK TO SITTING ON SIDE OF FLAT BED WITH BEDRAILS: A LOT
PERSONAL GROOMING: A LOT
EATING MEALS: A LOT
MOVING TO AND FROM BED TO CHAIR: TOTAL
WALKING IN HOSPITAL ROOM: TOTAL
TOILETING: TOTAL
MOVING FROM LYING ON BACK TO SITTING ON SIDE OF FLAT BED WITH BEDRAILS: TOTAL
CLIMB 3 TO 5 STEPS WITH RAILING: TOTAL
DRESSING REGULAR LOWER BODY CLOTHING: TOTAL
TURNING FROM BACK TO SIDE WHILE IN FLAT BAD: TOTAL
DRESSING REGULAR LOWER BODY CLOTHING: A LOT
MOVING FROM LYING ON BACK TO SITTING ON SIDE OF FLAT BED WITH BEDRAILS: A LOT
TURNING FROM BACK TO SIDE WHILE IN FLAT BAD: A LOT
HELP NEEDED FOR BATHING: TOTAL
STANDING UP FROM CHAIR USING ARMS: A LOT
TOILETING: TOTAL
HELP NEEDED FOR BATHING: TOTAL
DRESSING REGULAR UPPER BODY CLOTHING: TOTAL
MOBILITY SCORE: 8
DRESSING REGULAR UPPER BODY CLOTHING: TOTAL
HELP NEEDED FOR BATHING: TOTAL
TOILETING: TOTAL
HELP NEEDED FOR BATHING: TOTAL
DAILY ACTIVITIY SCORE: 6
PERSONAL GROOMING: A LOT
TURNING FROM BACK TO SIDE WHILE IN FLAT BAD: TOTAL
WALKING IN HOSPITAL ROOM: TOTAL
TURNING FROM BACK TO SIDE WHILE IN FLAT BAD: TOTAL
STANDING UP FROM CHAIR USING ARMS: TOTAL
CLIMB 3 TO 5 STEPS WITH RAILING: TOTAL
STANDING UP FROM CHAIR USING ARMS: TOTAL
STANDING UP FROM CHAIR USING ARMS: TOTAL
WALKING IN HOSPITAL ROOM: TOTAL
DAILY ACTIVITIY SCORE: 7
DAILY ACTIVITIY SCORE: 7
WALKING IN HOSPITAL ROOM: TOTAL
MOVING TO AND FROM BED TO CHAIR: TOTAL
DAILY ACTIVITIY SCORE: 10
DRESSING REGULAR LOWER BODY CLOTHING: TOTAL
PERSONAL GROOMING: TOTAL
MOBILITY SCORE: 8
DRESSING REGULAR UPPER BODY CLOTHING: TOTAL
EATING MEALS: TOTAL
MOVING FROM LYING ON BACK TO SITTING ON SIDE OF FLAT BED WITH BEDRAILS: A LOT
DAILY ACTIVITIY SCORE: 6
TURNING FROM BACK TO SIDE WHILE IN FLAT BAD: TOTAL
MOVING TO AND FROM BED TO CHAIR: TOTAL
MOBILITY SCORE: 8
WALKING IN HOSPITAL ROOM: TOTAL
DRESSING REGULAR LOWER BODY CLOTHING: TOTAL
EATING MEALS: A LITTLE
DRESSING REGULAR LOWER BODY CLOTHING: TOTAL
MOBILITY SCORE: 6
DAILY ACTIVITIY SCORE: 7
MOBILITY SCORE: 6
WALKING IN HOSPITAL ROOM: TOTAL
HELP NEEDED FOR BATHING: TOTAL
DRESSING REGULAR UPPER BODY CLOTHING: TOTAL
DRESSING REGULAR UPPER BODY CLOTHING: TOTAL
MOVING FROM LYING ON BACK TO SITTING ON SIDE OF FLAT BED WITH BEDRAILS: TOTAL
STANDING UP FROM CHAIR USING ARMS: TOTAL
TOILETING: TOTAL
PERSONAL GROOMING: A LOT
PERSONAL GROOMING: TOTAL
STANDING UP FROM CHAIR USING ARMS: TOTAL
HELP NEEDED FOR BATHING: TOTAL
TURNING FROM BACK TO SIDE WHILE IN FLAT BAD: TOTAL
CLIMB 3 TO 5 STEPS WITH RAILING: TOTAL
STANDING UP FROM CHAIR USING ARMS: TOTAL
TOILETING: TOTAL
PERSONAL GROOMING: TOTAL
EATING MEALS: A LOT
PERSONAL GROOMING: TOTAL
CLIMB 3 TO 5 STEPS WITH RAILING: TOTAL
TURNING FROM BACK TO SIDE WHILE IN FLAT BAD: A LITTLE
STANDING UP FROM CHAIR USING ARMS: TOTAL
CLIMB 3 TO 5 STEPS WITH RAILING: TOTAL
HELP NEEDED FOR BATHING: TOTAL
TURNING FROM BACK TO SIDE WHILE IN FLAT BAD: TOTAL
TURNING FROM BACK TO SIDE WHILE IN FLAT BAD: TOTAL
TURNING FROM BACK TO SIDE WHILE IN FLAT BAD: A LOT
CLIMB 3 TO 5 STEPS WITH RAILING: TOTAL
HELP NEEDED FOR BATHING: TOTAL
MOVING FROM LYING ON BACK TO SITTING ON SIDE OF FLAT BED WITH BEDRAILS: TOTAL
DRESSING REGULAR UPPER BODY CLOTHING: TOTAL
DRESSING REGULAR LOWER BODY CLOTHING: TOTAL
WALKING IN HOSPITAL ROOM: TOTAL

## 2023-01-01 ASSESSMENT — ENCOUNTER SYMPTOMS
AGITATION: 0
PAIN SEVERITY GOAL: 0/10
PAIN LOCATION: LEFT KNEE
ABDOMINAL PAIN: 0
MUSCLE WEAKNESS: 1
PAIN LOCATION: LEFT FOOT
FREQUENCY: 0
LAST BOWEL MOVEMENT: 66813
LOWEST PAIN SEVERITY IN PAST 24 HOURS: 2/10
PAIN LOCATION - PAIN SEVERITY: 9/10
ARTHRALGIAS: 1
SEIZURES: 0
VOMITING: 0
PAIN LOCATION - PAIN SEVERITY: 6/10
ARTHRALGIAS: 1
FEVER: 0
PAIN: 1
UNEXPECTED WEIGHT CHANGE: 0
DIARRHEA: 1
DIFFICULTY URINATING: 0
ABDOMINAL PAIN: 0
MUSCLE WEAKNESS: 1
PAIN: 1
HIGHEST PAIN SEVERITY IN PAST 24 HOURS: 10/10
ENDOCRINE NEGATIVE: 1
SHORTNESS OF BREATH: 0
DIZZINESS: 0
PALPITATIONS: 0
PAIN LOCATION: RIGHT BUTTOCK
BACK PAIN: 0
SEIZURES: 0
CONSTIPATION: 0
NAUSEA: 0
DIFFICULTY URINATING: 0
HEADACHES: 0
UNEXPECTED WEIGHT CHANGE: 0
LAST BOWEL MOVEMENT: 66809
COUGH: 0
PAIN LOCATION: LEFT FOOT
NEUROLOGICAL NEGATIVE: 1
HIGHEST PAIN SEVERITY IN PAST 24 HOURS: 6/10
SUBJECTIVE PAIN PROGRESSION: WAXING AND WANING
PAIN LOCATION - PAIN SEVERITY: 8/10
PAIN: 1
COUGH: 0
EYES NEGATIVE: 1
PALPITATIONS: 0
PAIN LOCATION - PAIN DURATION: DAILY
CONSTITUTIONAL NEGATIVE: 1
CHANGE IN APPETITE: DECREASED
PSYCHIATRIC NEGATIVE: 1
PALPITATIONS: 0
SKIN LESIONS: 1
SORE THROAT: 0
SORE THROAT: 0
RESPIRATORY NEGATIVE: 1
PAIN LOCATION: LEFT BUTTOCK
FEVER: 0
SKIN LESIONS: 1
HIGHEST PAIN SEVERITY IN PAST 24 HOURS: 9/10
PAIN LOCATION - PAIN SEVERITY: 8/10
PAIN LOCATION: PERINEUM
COUGH: 0
FEVER: 0
DIFFICULTY URINATING: 0
SHORTNESS OF BREATH: 0
MUSCLE WEAKNESS: 1
SUBJECTIVE PAIN PROGRESSION: WAXING AND WANING
GASTROINTESTINAL NEGATIVE: 1
APPETITE LEVEL: GOOD
LIMITED RANGE OF MOTION: 1
APPETITE LEVEL: FAIR
DIARRHEA: 1
HIGHEST PAIN SEVERITY IN PAST 24 HOURS: 8/10
DENIES PAIN: 1
CHANGE IN APPETITE: DECREASED
DIZZINESS: 0
UNEXPECTED WEIGHT CHANGE: 0
AGITATION: 0
PAIN: 1
CHANGE IN APPETITE: DECREASED
APPETITE LEVEL: FAIR
DIFFICULTY URINATING: 0
NERVOUS/ANXIOUS: 0
LOWER EXTREMITY EDEMA: 1
BACK PAIN: 0
APPETITE LEVEL: FAIR
FEVER: 0
NAUSEA: 0
CONSTIPATION: 0
PAIN LOCATION: LEFT BUTTOCK
BACK PAIN: 0
PAIN LOCATION: LEFT LEG
PERSON REPORTING PAIN: PATIENT
DIARRHEA: 1
DIARRHEA: 1
CHANGE IN APPETITE: UNCHANGED
CHANGE IN APPETITE: UNCHANGED
NERVOUS/ANXIOUS: 0
PAIN: 1
VOMITING: 0
SKIN LESIONS: 1
GASTROINTESTINAL NEGATIVE: 1
DIARRHEA: 0
NEUROLOGICAL NEGATIVE: 1
SKIN CHANGES: 1
PAIN SEVERITY GOAL: 0/10
PAIN LOCATION - RELIEVING FACTORS: REST, MEDS, ELEVATION
AGITATION: 0
LOWEST PAIN SEVERITY IN PAST 24 HOURS: 2/10
UNEXPECTED WEIGHT CHANGE: 0
SEIZURES: 0
AGITATION: 0
PAIN: 1
DIZZINESS: 0
PAIN LOCATION - PAIN FREQUENCY: FREQUENT
BOWEL INCONTINENCE: 1
PAIN LOCATION - PAIN SEVERITY: 9/10
APPETITE LEVEL: FAIR
DIARRHEA: 1
HIGHEST PAIN SEVERITY IN PAST 24 HOURS: 5/10
PAIN LOCATION - PAIN SEVERITY: 3/10
BACK PAIN: 0
PERSON REPORTING PAIN: PATIENT
PERSON REPORTING PAIN: PATIENT
LOWEST PAIN SEVERITY IN PAST 24 HOURS: 2/10
CARDIOVASCULAR NEGATIVE: 1
CONSTIPATION: 0
VOMITING: 0
MUSCLE WEAKNESS: 1
EYES NEGATIVE: 1
NERVOUS/ANXIOUS: 0
COUGH: 0
SHORTNESS OF BREATH: 0
CHANGE IN APPETITE: UNCHANGED
FREQUENCY: 0
PAIN LOCATION: RIGHT BUTTOCK
DIARRHEA: 1
PAIN LOCATION - PAIN SEVERITY: 3/10
DIARRHEA: 1
NERVOUS/ANXIOUS: 0
PAIN LOCATION - PAIN SEVERITY: 6/10
PAIN LOCATION: LEFT BUTTOCK
LOWER EXTREMITY EDEMA: 1
PAIN LOCATION: RIGHT KNEE
DIZZINESS: 0
LOWEST PAIN SEVERITY IN PAST 24 HOURS: 2/10
LOWEST PAIN SEVERITY IN PAST 24 HOURS: 4/10
CONSTIPATION: 0
ENDOCRINE NEGATIVE: 1
SUBJECTIVE PAIN PROGRESSION: GRADUALLY IMPROVING
ABDOMINAL PAIN: 0
PERSON REPORTING PAIN: PATIENT
HEADACHES: 0
PSYCHIATRIC NEGATIVE: 1
DIARRHEA: 0
DIARRHEA: 0
PAIN LOCATION - PAIN SEVERITY: 8/10
ARTHRALGIAS: 1
VOMITING: 0
PERSON REPORTING PAIN: PATIENT
APPETITE LEVEL: FAIR
SUBJECTIVE PAIN PROGRESSION: GRADUALLY IMPROVING
PAIN LOCATION: RIGHT FOOT
ABDOMINAL PAIN: 0
DIARRHEA: 0
MUSCLE WEAKNESS: 1
SEIZURES: 0
FATIGUE: 1
SHORTNESS OF BREATH: 0
RESPIRATORY NEGATIVE: 1
STOOL FREQUENCY: TWICE DAILY
PAIN: 1
PAIN LOCATION - PAIN SEVERITY: 4/10
MUSCLE WEAKNESS: 1
CARDIOVASCULAR NEGATIVE: 1
SORE THROAT: 0
FREQUENCY: 0
PAIN LOCATION - PAIN SEVERITY: 4/10
PAIN LOCATION - PAIN SEVERITY: 10/10
PAIN SEVERITY GOAL: 0/10
HEADACHES: 0
PAIN: 1
SORE THROAT: 0
HIGHEST PAIN SEVERITY IN PAST 24 HOURS: 7/10
LOWEST PAIN SEVERITY IN PAST 24 HOURS: 9/10
MUSCULOSKELETAL NEGATIVE: 1
PALPITATIONS: 0
PERSON REPORTING PAIN: PATIENT
PAIN SEVERITY GOAL: 0/10
FREQUENCY: 0
LAST BOWEL MOVEMENT: 66802
PAIN LOCATION: VAGINA
NAUSEA: 0
CONSTITUTIONAL NEGATIVE: 1
PAIN LOCATION - PAIN SEVERITY: 9/10
LIMITED RANGE OF MOTION: 1
SUBJECTIVE PAIN PROGRESSION: WAXING AND WANING
SKIN LESIONS: 1
STOOL FREQUENCY: LESS THAN DAILY
PAIN LOCATION - PAIN SEVERITY: 9/10
MUSCULOSKELETAL NEGATIVE: 1
PAIN LOCATION: RIGHT FOOT
NAUSEA: 0
HIGHEST PAIN SEVERITY IN PAST 24 HOURS: 5/10
PAIN SEVERITY GOAL: 0/10
PAIN SEVERITY GOAL: 0/10
ARTHRALGIAS: 1
PERSON REPORTING PAIN: PATIENT
HEADACHES: 0

## 2023-01-01 ASSESSMENT — PAIN SCALES - WONG BAKER

## 2023-01-01 ASSESSMENT — PAIN SCALES - GENERAL
PAINLEVEL_OUTOF10: 0 - NO PAIN
PAINLEVEL_OUTOF10: 2
PAINLEVEL_OUTOF10: 0 - NO PAIN
PAINLEVEL_OUTOF10: 8
PAINLEVEL_OUTOF10: 0 - NO PAIN
PAINLEVEL_OUTOF10: 3
PAINLEVEL_OUTOF10: 0 - NO PAIN
PAINLEVEL_OUTOF10: 2
PAINLEVEL_OUTOF10: 9
PAINLEVEL_OUTOF10: 0 - NO PAIN
PAINLEVEL_OUTOF10: 7
PAINLEVEL_OUTOF10: 0 - NO PAIN
PAINLEVEL_OUTOF10: 2
PAINLEVEL_OUTOF10: 0 - NO PAIN
PAINLEVEL_OUTOF10: 0 - NO PAIN
PAINLEVEL_OUTOF10: 7
PAINLEVEL_OUTOF10: 0 - NO PAIN
PAINLEVEL_OUTOF10: 9
PAINLEVEL_OUTOF10: 0 - NO PAIN
PAINLEVEL_OUTOF10: 3
PAINLEVEL_OUTOF10: 0 - NO PAIN
PAINLEVEL_OUTOF10: 4
PAINLEVEL_OUTOF10: 0 - NO PAIN
PAINLEVEL_OUTOF10: 6
PAINLEVEL_OUTOF10: 0 - NO PAIN
PAINLEVEL_OUTOF10: 10 - WORST POSSIBLE PAIN
PAINLEVEL_OUTOF10: 0 - NO PAIN
PAINLEVEL_OUTOF10: 2
PAINLEVEL_OUTOF10: 10 - WORST POSSIBLE PAIN
PAINLEVEL_OUTOF10: 0 - NO PAIN
PAINLEVEL_OUTOF10: 3
PAINLEVEL_OUTOF10: 0 - NO PAIN
PAINLEVEL_OUTOF10: 0 - NO PAIN
PAINLEVEL_OUTOF10: 3
PAINLEVEL_OUTOF10: 0 - NO PAIN
PAINLEVEL_OUTOF10: 2
PAINLEVEL_OUTOF10: 0 - NO PAIN

## 2023-01-01 ASSESSMENT — ACTIVITIES OF DAILY LIVING (ADL)
AMBULATION ASSISTANCE: NON-AMBULATORY
ADEQUATE_TO_COMPLETE_ADL: YES
FEEDING YOURSELF: NEEDS ASSISTANCE
ADLS_ADDRESSED: NO
HOME_MANAGEMENT_TIME_ENTRY: 38
DRESSING YOURSELF: DEPENDENT
OASIS_M1830: 06
MONEY MANAGEMENT (EXPENSES/BILLS): NEEDS ASSISTANCE
ADLS_ADDRESSED: NO
CURRENT_FUNCTION: ONE PERSON
AMBULATION ASSISTANCE: NON-AMBULATORY
ADL_ASSISTANCE: NEEDS ASSISTANCE
TOILETING: DEPENDENT
GROOMING: DEPENDENT
BATHING_LEVEL_OF_ASSISTANCE: DEPENDENT
MONEY MANAGEMENT (EXPENSES/BILLS): NEEDS ASSISTANCE
PATIENT'S MEMORY ADEQUATE TO SAFELY COMPLETE DAILY ACTIVITIES?: NO
ADL_ASSISTANCE: NEEDS ASSISTANCE
BATHING_ASSISTANCE: MAXIMAL
ENTERING_EXITING_HOME: ONE PERSON
MONEY MANAGEMENT (EXPENSES/BILLS): NEEDS ASSISTANCE
HOME_MANAGEMENT_TIME_ENTRY: 14
JUDGMENT_ADEQUATE_SAFELY_COMPLETE_DAILY_ACTIVITIES: NO
BATHING: DEPENDENT
HEARING - LEFT EAR: FUNCTIONAL
EFFECT OF PAIN ON DAILY ACTIVITIES: SLEEPING
BATHING_WHERE_ASSESSED: BED LEVEL
WALKS IN HOME: DEPENDENT
HEARING - RIGHT EAR: FUNCTIONAL
LACK_OF_TRANSPORTATION: NO
BATHING_ASSISTANCE: TOTAL

## 2023-01-01 ASSESSMENT — PAIN SCALES - PAIN ASSESSMENT IN ADVANCED DEMENTIA (PAINAD)
FACIALEXPRESSION: 1 - SAD. FRIGHTENED. FROWN.
CONSOLABILITY: 1 - DISTRACTED OR REASSURED BY VOICE OR TOUCH.
FACIALEXPRESSION: 0 - SMILING OR INEXPRESSIVE.
CONSOLABILITY: 0
TOTALSCORE: 4
NEGVOCALIZATION: 0 - NONE.
BODYLANGUAGE: 0 - RELAXED.
BREATHING: 0
BREATHING: 0
FACIALEXPRESSION: 0 - SMILING OR INEXPRESSIVE.
NEGVOCALIZATION: 0
CONSOLABILITY: NO NEED TO CONSOLE
CONSOLABILITY: 0
NEGVOCALIZATION: 0
CONSOLABILITY: 1
BREATHING: NORMAL
CONSOLABILITY: 0 - NO NEED TO CONSOLE.
FACIALEXPRESSION: 1
BODYLANGUAGE: 1 - TENSE. DISTRESSED PACING. FIDGETING.
TOTALSCORE: 0
FACIALEXPRESSION: 0
NEGVOCALIZATION: 1
NEGVOCALIZATION: 0 - NONE.
TOTALSCORE: 0
BODYLANGUAGE: 0
FACIALEXPRESSION: SMILING OR INEXPRESSIVE
NEGVOCALIZATION: 1 - OCCASIONAL MOAN OR GROAN. LOW-LEVEL SPEECH WITH A NEGATIVE OR DISAPPROVING QUALITY.
BODYLANGUAGE: RELAXED
BODYLANGUAGE: 1
CONSOLABILITY: 0 - NO NEED TO CONSOLE.
FACIALEXPRESSION: 0
BREATHING: 0

## 2023-01-01 ASSESSMENT — PAIN - FUNCTIONAL ASSESSMENT
PAIN_FUNCTIONAL_ASSESSMENT: 0-10
PAIN_FUNCTIONAL_ASSESSMENT: NO/DENIES PAIN
PAIN_FUNCTIONAL_ASSESSMENT: 0-10
PAIN_FUNCTIONAL_ASSESSMENT: FLACC (FACE, LEGS, ACTIVITY, CRY, CONSOLABILITY)
PAIN_FUNCTIONAL_ASSESSMENT: FLACC (FACE, LEGS, ACTIVITY, CRY, CONSOLABILITY)
PAIN_FUNCTIONAL_ASSESSMENT: 0-10
PAIN_FUNCTIONAL_ASSESSMENT: FLACC (FACE, LEGS, ACTIVITY, CRY, CONSOLABILITY)
PAIN_FUNCTIONAL_ASSESSMENT: 0-10
PAIN_FUNCTIONAL_ASSESSMENT: NO/DENIES PAIN
PAIN_FUNCTIONAL_ASSESSMENT: 0-10
PAIN_FUNCTIONAL_ASSESSMENT: NO/DENIES PAIN
PAIN_FUNCTIONAL_ASSESSMENT: 0-10
PAIN_FUNCTIONAL_ASSESSMENT: FLACC (FACE, LEGS, ACTIVITY, CRY, CONSOLABILITY)
PAIN_FUNCTIONAL_ASSESSMENT: 0-10
PAIN_FUNCTIONAL_ASSESSMENT: NO/DENIES PAIN
PAIN_FUNCTIONAL_ASSESSMENT: 0-10
PAIN_FUNCTIONAL_ASSESSMENT: NO/DENIES PAIN
PAIN_FUNCTIONAL_ASSESSMENT: 0-10
PAIN_FUNCTIONAL_ASSESSMENT: 0-10
PAIN_FUNCTIONAL_ASSESSMENT: FLACC (FACE, LEGS, ACTIVITY, CRY, CONSOLABILITY)
PAIN_FUNCTIONAL_ASSESSMENT: 0-10
PAIN_FUNCTIONAL_ASSESSMENT: NO/DENIES PAIN
PAIN_FUNCTIONAL_ASSESSMENT: 0-10
PAIN_FUNCTIONAL_ASSESSMENT: FLACC (FACE, LEGS, ACTIVITY, CRY, CONSOLABILITY)

## 2023-01-01 ASSESSMENT — LIFESTYLE VARIABLES
AUDIT-C TOTAL SCORE: 0
SKIP TO QUESTIONS 9-10: 1
HOW OFTEN DO YOU HAVE 6 OR MORE DRINKS ON ONE OCCASION: NEVER
SUBSTANCE_ABUSE_PAST_12_MONTHS: NO
PRESCIPTION_ABUSE_PAST_12_MONTHS: NO
HOW MANY STANDARD DRINKS CONTAINING ALCOHOL DO YOU HAVE ON A TYPICAL DAY: PATIENT DOES NOT DRINK
AUDIT-C TOTAL SCORE: 0
HOW OFTEN DO YOU HAVE A DRINK CONTAINING ALCOHOL: NEVER

## 2023-01-01 ASSESSMENT — PATIENT HEALTH QUESTIONNAIRE - PHQ9
SUM OF ALL RESPONSES TO PHQ9 QUESTIONS 1 & 2: 0
1. LITTLE INTEREST OR PLEASURE IN DOING THINGS: NOT AT ALL
2. FEELING DOWN, DEPRESSED OR HOPELESS: NOT AT ALL

## 2023-01-01 ASSESSMENT — PAIN DESCRIPTION - DESCRIPTORS
DESCRIPTORS: SORE;ACHING
DESCRIPTORS: CRAMPING
DESCRIPTORS: CRAMPING
DESCRIPTORS: ACHING;DISCOMFORT
DESCRIPTORS: CRAMPING

## 2023-01-01 ASSESSMENT — PAIN DESCRIPTION - LOCATION
LOCATION: OTHER (COMMENT)
LOCATION: GENERALIZED

## 2023-01-01 ASSESSMENT — COLUMBIA-SUICIDE SEVERITY RATING SCALE - C-SSRS
2. HAVE YOU ACTUALLY HAD ANY THOUGHTS OF KILLING YOURSELF?: NO
6. HAVE YOU EVER DONE ANYTHING, STARTED TO DO ANYTHING, OR PREPARED TO DO ANYTHING TO END YOUR LIFE?: NO
1. IN THE PAST MONTH, HAVE YOU WISHED YOU WERE DEAD OR WISHED YOU COULD GO TO SLEEP AND NOT WAKE UP?: NO

## 2023-04-06 DIAGNOSIS — M10.9 GOUT, UNSPECIFIED CAUSE, UNSPECIFIED CHRONICITY, UNSPECIFIED SITE: Primary | ICD-10-CM

## 2023-04-06 RX ORDER — COLCHICINE 0.6 MG/1
0.6 TABLET ORAL DAILY
COMMUNITY
End: 2023-04-06 | Stop reason: SDUPTHER

## 2023-04-06 RX ORDER — COLCHICINE 0.6 MG/1
0.6 TABLET ORAL DAILY
Qty: 90 TABLET | Refills: 3 | Status: SHIPPED | OUTPATIENT
Start: 2023-04-06 | End: 2023-01-01 | Stop reason: ENTERED-IN-ERROR

## 2023-09-14 PROBLEM — R55 SYNCOPE: Status: ACTIVE | Noted: 2023-01-01

## 2023-09-14 PROBLEM — L85.3 DRY SKIN: Status: ACTIVE | Noted: 2023-01-01

## 2023-09-14 PROBLEM — N18.9 ANEMIA OF RENAL DISEASE: Status: ACTIVE | Noted: 2023-01-01

## 2023-09-14 PROBLEM — Z12.11 COLON CANCER SCREENING: Status: ACTIVE | Noted: 2023-01-01

## 2023-09-14 PROBLEM — E87.1 HYPONATREMIA: Status: ACTIVE | Noted: 2023-01-01

## 2023-09-14 PROBLEM — M65.9 TENOSYNOVITIS OF FINGERS: Status: ACTIVE | Noted: 2023-01-01

## 2023-09-14 PROBLEM — Z85.3 HISTORY OF BREAST CANCER IN FEMALE: Status: ACTIVE | Noted: 2023-01-01

## 2023-09-14 PROBLEM — L97.509 DIABETIC FOOT ULCER (MULTI): Status: ACTIVE | Noted: 2023-01-01

## 2023-09-14 PROBLEM — H57.03 MIOSIS: Status: ACTIVE | Noted: 2023-01-01

## 2023-09-14 PROBLEM — A49.8 INFECTION DUE TO ESCHERICHIA COLI: Status: ACTIVE | Noted: 2023-01-01

## 2023-09-14 PROBLEM — C50.919 BREAST CANCER (MULTI): Status: ACTIVE | Noted: 2023-01-01

## 2023-09-14 PROBLEM — M1A.9XX1 CHRONIC GOUT WITH TOPHUS: Status: ACTIVE | Noted: 2023-01-01

## 2023-09-14 PROBLEM — I73.9 PERIPHERAL VASCULAR DISEASE (CMS-HCC): Status: ACTIVE | Noted: 2023-01-01

## 2023-09-14 PROBLEM — I48.0 PAROXYSMAL ATRIAL FIBRILLATION (MULTI): Status: ACTIVE | Noted: 2023-01-01
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
## 2023-09-14 PROBLEM — E78.5 DYSLIPIDEMIA: Status: ACTIVE | Noted: 2023-01-01

## 2023-09-14 PROBLEM — Z99.2 END-STAGE RENAL DISEASE ON HEMODIALYSIS (MULTI): Status: ACTIVE | Noted: 2023-01-01

## 2023-09-14 PROBLEM — L82.1 OTHER SEBORRHEIC KERATOSIS: Status: ACTIVE | Noted: 2018-11-29

## 2023-09-14 PROBLEM — H02.831 DERMATOCHALASIS OF RIGHT UPPER EYELID: Status: ACTIVE | Noted: 2023-01-01

## 2023-09-14 PROBLEM — E04.1 THYROID NODULE: Status: ACTIVE | Noted: 2023-01-01

## 2023-09-14 PROBLEM — N18.4 CHRONIC KIDNEY DISEASE (CKD), STAGE IV (SEVERE) (MULTI): Status: ACTIVE | Noted: 2023-01-01

## 2023-09-14 PROBLEM — F41.8 MIXED ANXIETY AND DEPRESSIVE DISORDER: Status: ACTIVE | Noted: 2023-01-01

## 2023-09-14 PROBLEM — K92.2 GASTROINTESTINAL HEMORRHAGE: Status: ACTIVE | Noted: 2023-01-01

## 2023-09-14 PROBLEM — J18.9 PNEUMONIA: Status: ACTIVE | Noted: 2023-01-01

## 2023-09-14 PROBLEM — H25.10 NUCLEAR SENILE CATARACT: Status: ACTIVE | Noted: 2023-01-01

## 2023-09-14 PROBLEM — E66.9 OBESITY WITH BODY MASS INDEX 30 OR GREATER: Status: ACTIVE | Noted: 2023-01-01

## 2023-09-14 PROBLEM — E11.9 DM (DIABETES MELLITUS) (MULTI): Status: ACTIVE | Noted: 2023-01-01

## 2023-09-14 PROBLEM — E78.00 HIGH CHOLESTEROL: Status: ACTIVE | Noted: 2023-01-01

## 2023-09-14 PROBLEM — M79.89 SWELLING OF FINGER: Status: ACTIVE | Noted: 2023-01-01

## 2023-09-14 PROBLEM — L08.9 WOUND INFECTION: Status: ACTIVE | Noted: 2023-01-01

## 2023-09-14 PROBLEM — L57.9 SKIN CHANGES DUE TO CHRONIC EXPOSURE TO NONIONIZING RADIATION, UNSPECIFIED: Status: ACTIVE | Noted: 2018-11-29

## 2023-09-14 PROBLEM — R41.82 ALTERED MENTAL STATUS: Status: ACTIVE | Noted: 2023-01-01

## 2023-09-14 PROBLEM — I50.9 HEART FAILURE (MULTI): Status: ACTIVE | Noted: 2023-01-01

## 2023-09-14 PROBLEM — D48.5 NEOPLASM OF UNCERTAIN BEHAVIOR OF SKIN: Status: ACTIVE | Noted: 2018-11-29

## 2023-09-14 PROBLEM — M65.9 TENOSYNOVITIS: Status: ACTIVE | Noted: 2023-01-01

## 2023-09-14 PROBLEM — N95.0 POSTMENOPAUSAL BLEEDING: Status: ACTIVE | Noted: 2023-01-01

## 2023-09-14 PROBLEM — B99.9 INFECTION: Status: ACTIVE | Noted: 2023-01-01

## 2023-09-14 PROBLEM — I63.9 CEREBROVASCULAR ACCIDENT (MULTI): Status: ACTIVE | Noted: 2023-01-01

## 2023-09-14 PROBLEM — S61.209A OPEN WOUND OF FINGER: Status: ACTIVE | Noted: 2023-01-01

## 2023-09-14 PROBLEM — I95.9 LOW BLOOD PRESSURE: Status: ACTIVE | Noted: 2023-01-01

## 2023-09-14 PROBLEM — H31.009 CHORIORETINAL SCAR: Status: ACTIVE | Noted: 2023-01-01

## 2023-09-14 PROBLEM — J01.90 SINUSITIS, ACUTE: Status: ACTIVE | Noted: 2023-01-01

## 2023-09-14 PROBLEM — R40.20 LOSS OF CONSCIOUSNESS (MULTI): Status: ACTIVE | Noted: 2023-01-01

## 2023-09-14 PROBLEM — T81.89XA NONHEALING SURGICAL WOUND: Status: ACTIVE | Noted: 2023-01-01

## 2023-09-14 PROBLEM — E11.3299 MILD NONPROLIFERATIVE DIABETIC RETINOPATHY (MULTI): Status: ACTIVE | Noted: 2023-01-01

## 2023-09-14 PROBLEM — M25.562 CHRONIC PAIN OF BOTH KNEES: Status: ACTIVE | Noted: 2023-01-01

## 2023-09-14 PROBLEM — J38.00 VOCAL CORD PARALYSIS: Status: ACTIVE | Noted: 2023-01-01

## 2023-09-14 PROBLEM — D63.1 ANEMIA OF CHRONIC RENAL FAILURE: Status: ACTIVE | Noted: 2023-01-01

## 2023-09-14 PROBLEM — R40.1 CLOUDED CONSCIOUSNESS: Status: ACTIVE | Noted: 2023-01-01

## 2023-09-14 PROBLEM — M19.90 ARTHRITIS: Status: ACTIVE | Noted: 2023-01-01

## 2023-09-14 PROBLEM — S09.90XA INJURY OF HEAD: Status: ACTIVE | Noted: 2023-01-01

## 2023-09-14 PROBLEM — R15.9 FECAL INCONTINENCE: Status: ACTIVE | Noted: 2023-01-01

## 2023-09-14 PROBLEM — M79.601 RIGHT ARM PAIN: Status: ACTIVE | Noted: 2023-01-01

## 2023-09-14 PROBLEM — N18.30 CHRONIC KIDNEY DISEASE, STAGE 3 (MULTI): Status: ACTIVE | Noted: 2023-01-01

## 2023-09-14 PROBLEM — I95.1 ORTHOSTATIC HYPOTENSION: Status: ACTIVE | Noted: 2023-01-01

## 2023-09-14 PROBLEM — M17.11 ARTHRITIS OF KNEE, RIGHT: Status: ACTIVE | Noted: 2023-01-01

## 2023-09-14 PROBLEM — E87.20 LACTIC ACIDOSIS: Status: ACTIVE | Noted: 2023-01-01

## 2023-09-14 PROBLEM — R26.2 DIFFICULTY WALKING: Status: ACTIVE | Noted: 2023-01-01

## 2023-09-14 PROBLEM — G92.8 TOXIC METABOLIC ENCEPHALOPATHY: Status: ACTIVE | Noted: 2023-01-01

## 2023-09-14 PROBLEM — C44.310 FACIAL BASAL CELL CANCER: Status: ACTIVE | Noted: 2023-01-01

## 2023-09-14 PROBLEM — R21 RASH: Status: ACTIVE | Noted: 2023-01-01

## 2023-09-14 PROBLEM — B35.4 TINEA CORPORIS: Status: ACTIVE | Noted: 2023-01-01

## 2023-09-14 PROBLEM — R53.83 FATIGUE: Status: ACTIVE | Noted: 2023-01-01

## 2023-09-14 PROBLEM — T14.8XXA WOUND INFECTION: Status: ACTIVE | Noted: 2023-01-01

## 2023-09-14 PROBLEM — Z85.828 PERSONAL HISTORY OF OTHER MALIGNANT NEOPLASM OF SKIN: Status: ACTIVE | Noted: 2018-11-29

## 2023-09-14 PROBLEM — G89.29 CHRONIC PAIN OF BOTH KNEES: Status: ACTIVE | Noted: 2023-01-01

## 2023-09-14 PROBLEM — M17.12 ARTHRITIS OF KNEE, LEFT: Status: ACTIVE | Noted: 2023-01-01

## 2023-09-14 PROBLEM — H02.834 DERMATOCHALASIS OF LEFT UPPER EYELID: Status: ACTIVE | Noted: 2023-01-01

## 2023-09-14 PROBLEM — M86.60 CHRONIC OSTEOMYELITIS (MULTI): Status: ACTIVE | Noted: 2023-01-01

## 2023-09-14 PROBLEM — G47.33 OBSTRUCTIVE SLEEP APNEA: Status: ACTIVE | Noted: 2023-01-01

## 2023-09-14 PROBLEM — L57.0 ACTINIC KERATOSIS: Status: ACTIVE | Noted: 2018-11-29

## 2023-09-14 PROBLEM — L82.0 INFLAMED SEBORRHEIC KERATOSIS: Status: ACTIVE | Noted: 2018-11-29

## 2023-09-14 PROBLEM — L02.91 PHLEGMON: Status: ACTIVE | Noted: 2023-01-01

## 2023-09-14 PROBLEM — Z99.2 DEPENDENCE ON RENAL DIALYSIS (CMS-HCC): Status: ACTIVE | Noted: 2023-01-01

## 2023-09-14 PROBLEM — F32.A DEPRESSION: Status: ACTIVE | Noted: 2023-01-01

## 2023-09-14 PROBLEM — E11.9 TYPE 2 DIABETES MELLITUS (MULTI): Status: ACTIVE | Noted: 2023-01-01

## 2023-09-14 PROBLEM — T23.222A: Status: ACTIVE | Noted: 2023-01-01

## 2023-09-14 PROBLEM — R51.9 HEADACHE: Status: ACTIVE | Noted: 2023-01-01

## 2023-09-14 PROBLEM — E78.2 MIXED HYPERLIPIDEMIA: Status: ACTIVE | Noted: 2023-01-01

## 2023-09-14 PROBLEM — L03.019 CELLULITIS OF FINGER: Status: ACTIVE | Noted: 2023-01-01

## 2023-09-14 PROBLEM — I10 HYPERTENSION: Status: ACTIVE | Noted: 2023-01-01

## 2023-09-14 PROBLEM — N18.9 ANEMIA OF CHRONIC RENAL FAILURE: Status: ACTIVE | Noted: 2023-01-01

## 2023-09-14 PROBLEM — M25.511 PAIN IN RIGHT SHOULDER: Status: ACTIVE | Noted: 2023-01-01

## 2023-09-14 PROBLEM — D63.1 ANEMIA OF RENAL DISEASE: Status: ACTIVE | Noted: 2023-01-01

## 2023-09-14 PROBLEM — E11.621 DIABETIC FOOT ULCER (MULTI): Status: ACTIVE | Noted: 2023-01-01

## 2023-09-14 PROBLEM — N18.6 END-STAGE RENAL DISEASE ON HEMODIALYSIS (MULTI): Status: ACTIVE | Noted: 2023-01-01

## 2023-09-14 PROBLEM — L98.9 INFLAMMATORY DERMATOSIS: Status: ACTIVE | Noted: 2023-01-01

## 2023-09-14 PROBLEM — M25.561 CHRONIC PAIN OF BOTH KNEES: Status: ACTIVE | Noted: 2023-01-01

## 2023-09-14 PROBLEM — S00.83XA CONTUSION OF FACE: Status: ACTIVE | Noted: 2023-01-01

## 2023-09-14 PROBLEM — J20.9 BRONCHITIS, ACUTE: Status: ACTIVE | Noted: 2023-01-01

## 2023-09-14 PROBLEM — L98.9 SKIN ABNORMALITY: Status: ACTIVE | Noted: 2023-01-01

## 2023-09-14 PROBLEM — R44.1 VISUAL HALLUCINATIONS: Status: ACTIVE | Noted: 2023-01-01

## 2023-09-14 PROBLEM — E16.2 HYPOGLYCEMIA: Status: ACTIVE | Noted: 2023-01-01

## 2023-09-14 PROBLEM — N18.6 END STAGE RENAL DISEASE (MULTI): Status: ACTIVE | Noted: 2023-01-01

## 2023-09-14 PROBLEM — S68.119A: Status: ACTIVE | Noted: 2023-01-01

## 2023-09-14 PROBLEM — M10.9 GOUT: Status: ACTIVE | Noted: 2023-01-01

## 2023-09-14 PROBLEM — R29.898 GROWING PAINS: Status: ACTIVE | Noted: 2023-01-01

## 2023-10-06 NOTE — TELEPHONE ENCOUNTER
Patient scheduled for appt in office with Dr. Nesha Murillo 10/10/23 at 1230; EKG 10/9/23 at 1230 in [facility]; Xray after EKG at [facility]. Notified pt via VM to mobile and home number.

## 2023-10-13 NOTE — LETTER
Patient: yAanna Gross  : 1950    Encounter Date: 10/13/2023    Subjective  Patient ID: Ayanna Gross is a 73 y.o. female who is acute skilled care and presents for initial visit for skilled nursing.    Was hospitalized for malfunction of dialysis catheter, which was replaced.  She was treated with vancomycin for MRSA bacteremia.  Had Lexiscan stress test for chest pain, which was low risk.  Started on Imdur.  She is alert, pleasant, denies any chest pain or shortness of breath at this time.  Appetite okay.  She requires assistance with transfers and ambulation         Review of Systems   Constitutional:  Negative for fever and unexpected weight change.   HENT:  Negative for sore throat.    Respiratory:  Negative for cough and shortness of breath.    Cardiovascular:  Negative for chest pain and palpitations.   Gastrointestinal:  Negative for abdominal pain, constipation, diarrhea, nausea and vomiting.   Genitourinary:  Negative for difficulty urinating and frequency.   Musculoskeletal:  Positive for arthralgias. Negative for back pain.   Skin:  Negative for rash.   Neurological:  Negative for dizziness, seizures and headaches.   Psychiatric/Behavioral:  Negative for agitation. The patient is not nervous/anxious.        Objective  There were no vitals taken for this visit.    Physical Exam  Vitals reviewed.   Constitutional:       General: She is not in acute distress.  HENT:      Mouth/Throat:      Mouth: Mucous membranes are moist.   Cardiovascular:      Rate and Rhythm: Regular rhythm.      Heart sounds: Normal heart sounds.   Pulmonary:      Breath sounds: Normal breath sounds. No rales.   Abdominal:      Palpations: Abdomen is soft.      Tenderness: There is no abdominal tenderness.   Musculoskeletal:         General: No tenderness.      Cervical back: Neck supple. No tenderness.   Skin:     General: Skin is warm.   Neurological:      General: No focal deficit present.      Mental Status: She is alert.    Psychiatric:         Behavior: Behavior normal.         Assessment/Plan  Problem List Items Addressed This Visit       Lactic acidosis    Peripheral vascular disease (CMS/[facility])    Hypertension    Mixed hyperlipidemia    Heart failure (CMS/[facility]) - Primary    DM (diabetes mellitus) (CMS/[facility])     We will monitor daughter blood glucose levels, check labs, PT OT evaluation.            Other Visit Diagnoses       Other acute osteomyelitis, left hand (CMS/[facility])                 Goals    None           Electronically Signed By: Deisy Conley MD   11/6/23 10:55 AM   Internal Medicine

## 2023-10-20 NOTE — LETTER
Patient: Ayanna Gross  : 1950    Encounter Date: 10/20/2023    Subjective  Patient ID: Ayanna Gross is a 73 y.o. female who is long term resident being seen and evaluated for multiple medical problems.    She is alert, pleasant, denies pain.  Appetite fair.  She participates in physical therapy, ambulates with walker.         Review of Systems   Constitutional:  Negative for fever and unexpected weight change.   HENT:  Negative for sore throat.    Respiratory:  Negative for cough and shortness of breath.    Cardiovascular:  Negative for chest pain and palpitations.   Gastrointestinal:  Negative for abdominal pain, constipation, diarrhea, nausea and vomiting.   Genitourinary:  Negative for difficulty urinating and frequency.   Musculoskeletal:  Positive for arthralgias. Negative for back pain.   Skin:  Negative for rash.   Neurological:  Negative for dizziness, seizures and headaches.   Psychiatric/Behavioral:  Negative for agitation. The patient is not nervous/anxious.        Objective  There were no vitals taken for this visit.    Physical Exam  Vitals reviewed.   Constitutional:       General: She is not in acute distress.  HENT:      Mouth/Throat:      Mouth: Mucous membranes are moist.   Cardiovascular:      Rate and Rhythm: Regular rhythm.      Heart sounds: Normal heart sounds.   Pulmonary:      Breath sounds: Normal breath sounds. No rales.   Abdominal:      Palpations: Abdomen is soft.      Tenderness: There is no abdominal tenderness.   Musculoskeletal:         General: No tenderness.      Cervical back: Neck supple. No tenderness.   Skin:     General: Skin is warm.   Neurological:      General: No focal deficit present.      Mental Status: She is alert.   Psychiatric:         Behavior: Behavior normal.         Assessment/Plan  Diagnoses and all orders for this visit:  Peripheral vascular disease (CMS/HCC)  Paroxysmal atrial fibrillation (CMS/HCC)  Primary hypertension  Mixed hyperlipidemia  Type 2  diabetes mellitus with other specified complication, with long-term current use of insulin (CMS/[facility])       Goals    None           Electronically Signed By: Deisy Conley MD   12/29/23 11:43 AM

## 2023-10-27 NOTE — LETTER
Patient: Ayanna Gross  : 1950    Encounter Date: 10/27/2023    Subjective  Patient ID: Ayanna Gross is a 73 y.o. female who is acute skilled care being seen and evaluated for multiple medical problems.    Alert, pleasant denies pain. No respiratory distress noted. Appetite fair. She participates in physical therapy, ambulates with walker, no recent falls.  She receives vancomycin IV with dialysis, will check labs           Review of Systems   Constitutional:  Negative for fever and unexpected weight change.   HENT:  Negative for sore throat.    Respiratory:  Negative for cough and shortness of breath.    Cardiovascular:  Negative for chest pain and palpitations.   Gastrointestinal:  Negative for abdominal pain, constipation, diarrhea, nausea and vomiting.   Genitourinary:  Negative for difficulty urinating and frequency.   Musculoskeletal:  Positive for arthralgias. Negative for back pain.   Skin:  Negative for rash.   Neurological:  Negative for dizziness, seizures and headaches.   Psychiatric/Behavioral:  Negative for agitation. The patient is not nervous/anxious.        Objective  There were no vitals taken for this visit.    Physical Exam  Vitals reviewed.   Constitutional:       General: She is not in acute distress.  HENT:      Mouth/Throat:      Mouth: Mucous membranes are moist.   Cardiovascular:      Rate and Rhythm: Regular rhythm.      Heart sounds: Normal heart sounds.   Pulmonary:      Breath sounds: Normal breath sounds. No rales.   Abdominal:      Palpations: Abdomen is soft.      Tenderness: There is no abdominal tenderness.   Musculoskeletal:         General: No tenderness.      Cervical back: Neck supple. No tenderness.   Skin:     General: Skin is warm.   Neurological:      General: No focal deficit present.      Mental Status: She is alert.   Psychiatric:         Behavior: Behavior normal.         Assessment/Plan  Problem List Items Addressed This Visit       Paroxysmal atrial  fibrillation (CMS/[facility]) - Primary    Hypertension     Will monitor BP           Mixed hyperlipidemia    Chronic kidney disease (CKD), stage IV (severe) (CMS/[facility])    Type 2 diabetes mellitus (CMS/[facility])     Will monitor blood glucose               Goals    None           Electronically Signed By: Deisy Conley MD   12/30/23 10:12 PM

## 2023-11-03 NOTE — LETTER
Patient: Ayanna Gross  : 1950    Encounter Date: 2023    Subjective  Patient ID: Ayanna Gross is a 73 y.o. female who is acute skilled care being seen and evaluated for multiple medical problems.    Alert, pleasant, she is complaining of abdominal discomfort and diarrhea.  No fever noted.  Appetite fair.  She participates in physical therapy and ambulates short distance with walker         Review of Systems   Gastrointestinal:  Positive for diarrhea.       Objective  There were no vitals taken for this visit.    Physical Exam  Vitals reviewed.   Constitutional:       General: She is not in acute distress.  HENT:      Mouth/Throat:      Mouth: Mucous membranes are moist.   Cardiovascular:      Rate and Rhythm: Regular rhythm.      Heart sounds: Normal heart sounds.   Pulmonary:      Breath sounds: Normal breath sounds. No rales.   Abdominal:      Palpations: Abdomen is soft.      Tenderness: There is no abdominal tenderness.   Musculoskeletal:         General: No tenderness.      Cervical back: Neck supple. No tenderness.   Neurological:      Mental Status: She is alert.         Assessment/Plan  Problem List Items Addressed This Visit       Hypertension - Primary    Mixed hyperlipidemia    DM (diabetes mellitus) (CMS/[facility])     Other Visit Diagnoses       Diarrhea of presumed infectious origin                 Goals    None     Will check stool for C. difficile      Electronically Signed By: Deisy Conley MD   24  5:45 PM

## 2023-11-06 NOTE — TELEPHONE ENCOUNTER
**correction, Appointment is to follow up for monitoring of mitral valve vegetation, not cabg. Called and updated rehab.    Called and left VM on pt phone asking to call back for follow up appointment from CABG surgery as she missed her 10/10/23 appt and never returned the office calls. Then called mobile and was able to speak to spouse, Hossein. Hossein [facility].    Left message with  after being disconnected x2 for nurse to call office back and reschedule the missed appointment for patient.

## 2023-11-06 NOTE — TELEPHONE ENCOUNTER
**correction, Appointment is to follow up for monitoring of mitral valve vegetation.     [facility] returned our call. Appointment made for Ayanna to come with an escort or family member 11/28/23 at 1230pm. [facility] to set up EKG and CXR at facility for prior to appt. If they cannot they will call us to assist with those tests to be done at [place].

## 2023-11-07 NOTE — PROGRESS NOTES
Subjective   Patient ID: Ayanna Gross is a 73 y.o. female who is long term resident being seen and evaluated for multiple medical problems.    She is alert, pleasant, denies pain.  Appetite fair.  She participates in physical therapy, ambulates with walker.         Review of Systems   Constitutional:  Negative for fever and unexpected weight change.   HENT:  Negative for sore throat.    Respiratory:  Negative for cough and shortness of breath.    Cardiovascular:  Negative for chest pain and palpitations.   Gastrointestinal:  Negative for abdominal pain, constipation, diarrhea, nausea and vomiting.   Genitourinary:  Negative for difficulty urinating and frequency.   Musculoskeletal:  Positive for arthralgias. Negative for back pain.   Skin:  Negative for rash.   Neurological:  Negative for dizziness, seizures and headaches.   Psychiatric/Behavioral:  Negative for agitation. The patient is not nervous/anxious.        Objective   There were no vitals taken for this visit.    Physical Exam  Vitals reviewed.   Constitutional:       General: She is not in acute distress.  HENT:      Mouth/Throat:      Mouth: Mucous membranes are moist.   Cardiovascular:      Rate and Rhythm: Regular rhythm.      Heart sounds: Normal heart sounds.   Pulmonary:      Breath sounds: Normal breath sounds. No rales.   Abdominal:      Palpations: Abdomen is soft.      Tenderness: There is no abdominal tenderness.   Musculoskeletal:         General: No tenderness.      Cervical back: Neck supple. No tenderness.   Skin:     General: Skin is warm.   Neurological:      General: No focal deficit present.      Mental Status: She is alert.   Psychiatric:         Behavior: Behavior normal.         Assessment/Plan   Diagnoses and all orders for this visit:  Peripheral vascular disease (CMS/HCC)  Paroxysmal atrial fibrillation (CMS/HCC)  Primary hypertension  Mixed hyperlipidemia  Type 2 diabetes mellitus with other specified complication, with  long-term current use of insulin (CMS/[facility])       Goals    None

## 2023-11-14 NOTE — PROGRESS NOTES
Subjective   Patient ID: Ayanna Gross is a 73 y.o. female who is acute skilled care being seen and evaluated for multiple medical problems.    Alert, pleasant denies pain. No respiratory distress noted. Appetite fair. She participates in physical therapy, ambulates with walker, no recent falls.  She receives vancomycin IV with dialysis, will check labs           Review of Systems   Constitutional:  Negative for fever and unexpected weight change.   HENT:  Negative for sore throat.    Respiratory:  Negative for cough and shortness of breath.    Cardiovascular:  Negative for chest pain and palpitations.   Gastrointestinal:  Negative for abdominal pain, constipation, diarrhea, nausea and vomiting.   Genitourinary:  Negative for difficulty urinating and frequency.   Musculoskeletal:  Positive for arthralgias. Negative for back pain.   Skin:  Negative for rash.   Neurological:  Negative for dizziness, seizures and headaches.   Psychiatric/Behavioral:  Negative for agitation. The patient is not nervous/anxious.        Objective   There were no vitals taken for this visit.    Physical Exam  Vitals reviewed.   Constitutional:       General: She is not in acute distress.  HENT:      Mouth/Throat:      Mouth: Mucous membranes are moist.   Cardiovascular:      Rate and Rhythm: Regular rhythm.      Heart sounds: Normal heart sounds.   Pulmonary:      Breath sounds: Normal breath sounds. No rales.   Abdominal:      Palpations: Abdomen is soft.      Tenderness: There is no abdominal tenderness.   Musculoskeletal:         General: No tenderness.      Cervical back: Neck supple. No tenderness.   Skin:     General: Skin is warm.   Neurological:      General: No focal deficit present.      Mental Status: She is alert.   Psychiatric:         Behavior: Behavior normal.         Assessment/Plan   Problem List Items Addressed This Visit       Paroxysmal atrial fibrillation (CMS/HCC) - Primary    Hypertension     Will monitor BP            Mixed hyperlipidemia    Chronic kidney disease (CKD), stage IV (severe) (CMS/[facility])    Type 2 diabetes mellitus (CMS/[facility])     Will monitor blood glucose               Goals    None

## 2023-11-14 NOTE — LETTER
Patient: Ayanna Gross  : 1950    Encounter Date: 2023    Subjective  Patient ID: Ayanna Gross is a 73 y.o. female who is acute skilled care being seen and evaluated for multiple medical problems.    Alert. Denies pain. No respiratory distress noted. Appetite fair. He participates in physical therapy, ambulates with walker, no recent falls.           Review of Systems   Constitutional:  Negative for fever and unexpected weight change.   HENT:  Negative for sore throat.    Respiratory:  Negative for cough and shortness of breath.    Cardiovascular:  Negative for chest pain and palpitations.   Gastrointestinal:  Negative for abdominal pain, constipation, diarrhea, nausea and vomiting.   Genitourinary:  Negative for difficulty urinating and frequency.   Musculoskeletal:  Positive for arthralgias. Negative for back pain.   Skin:  Negative for rash.   Neurological:  Negative for dizziness, seizures and headaches.   Psychiatric/Behavioral:  Negative for agitation. The patient is not nervous/anxious.        Objective  There were no vitals taken for this visit.    Physical Exam  Vitals reviewed.   Constitutional:       General: She is not in acute distress.  HENT:      Mouth/Throat:      Mouth: Mucous membranes are moist.   Cardiovascular:      Rate and Rhythm: Regular rhythm.      Heart sounds: Normal heart sounds.   Pulmonary:      Breath sounds: Normal breath sounds. No rales.   Abdominal:      Palpations: Abdomen is soft.      Tenderness: There is no abdominal tenderness.   Musculoskeletal:         General: No tenderness.      Cervical back: Neck supple. No tenderness.   Neurological:      Mental Status: She is alert.         Assessment/Plan  Problem List Items Addressed This Visit       Paroxysmal atrial fibrillation (CMS/HCC) - Primary    Hypertension     Will monitor BP           Heart failure (CMS/HCC)    Chronic kidney disease (CKD), stage IV (severe) (CMS/HCC)    Type 2 diabetes mellitus (CMS/HCC)      Will monitor blood glucose               Goals    None           Electronically Signed By: Deisy Conley MD   1/1/24 11:31 PM

## 2023-11-14 NOTE — PROGRESS NOTES
Subjective   Patient ID: Ayanna Gross is a 73 y.o. female who is acute skilled care being seen and evaluated for multiple medical problems.    Alert. Denies pain. No respiratory distress noted. Appetite fair. He participates in physical therapy, ambulates with walker, no recent falls.           Review of Systems   Constitutional:  Negative for fever and unexpected weight change.   HENT:  Negative for sore throat.    Respiratory:  Negative for cough and shortness of breath.    Cardiovascular:  Negative for chest pain and palpitations.   Gastrointestinal:  Negative for abdominal pain, constipation, diarrhea, nausea and vomiting.   Genitourinary:  Negative for difficulty urinating and frequency.   Musculoskeletal:  Positive for arthralgias. Negative for back pain.   Skin:  Negative for rash.   Neurological:  Negative for dizziness, seizures and headaches.   Psychiatric/Behavioral:  Negative for agitation. The patient is not nervous/anxious.        Objective   There were no vitals taken for this visit.    Physical Exam  Vitals reviewed.   Constitutional:       General: She is not in acute distress.  HENT:      Mouth/Throat:      Mouth: Mucous membranes are moist.   Cardiovascular:      Rate and Rhythm: Regular rhythm.      Heart sounds: Normal heart sounds.   Pulmonary:      Breath sounds: Normal breath sounds. No rales.   Abdominal:      Palpations: Abdomen is soft.      Tenderness: There is no abdominal tenderness.   Musculoskeletal:         General: No tenderness.      Cervical back: Neck supple. No tenderness.   Neurological:      Mental Status: She is alert.         Assessment/Plan   Problem List Items Addressed This Visit       Paroxysmal atrial fibrillation (CMS/HCC) - Primary    Hypertension     Will monitor BP           Heart failure (CMS/HCC)    Chronic kidney disease (CKD), stage IV (severe) (CMS/HCC)    Type 2 diabetes mellitus (CMS/HCC)     Will monitor blood glucose               Goals    None

## 2023-11-14 NOTE — TELEPHONE ENCOUNTER
Please call PMD for refills until you see Cardiology on 11/28/23  You are not under any cardiologist care currently, so I have no way to refill this Simvastatin 20 mg right now.

## 2023-11-24 NOTE — TELEPHONE ENCOUNTER
Spoke to patient and spouse via [phone]. Ayanna is just getting settled as she was recently released from rehab. She was not informed by [facility] that she had an appointment this coming Tuesday so we rescheduled it to accommodate her being able to schedule an Echo as well as transportation to the appointment. She has the number for central scheduling and wll book the echo to be completed by the time her appointment comes on 12/12/23.

## 2023-11-24 NOTE — TELEPHONE ENCOUNTER
Voicemail left on both mobile numbers listed for patient to call office regarding her appointment for Tuesday. Pt will need an Echocardiogram prior to the appointment or we will need to reschedule.

## 2023-12-06 PROBLEM — I95.9 HYPOTENSION, UNSPECIFIED HYPOTENSION TYPE: Status: ACTIVE | Noted: 2023-01-01

## 2023-12-06 NOTE — CONSULTS
"Vancomycin Dosing by Pharmacy- INITIAL    Ayanna Gross is a 73 y.o. year old female who Pharmacy has been consulted for vancomycin dosing for line infections. Based on the patient's indication and renal status this patient will be dosed based on a goal pre-HD level of 15-25.     Pt is ESRD on iHD M/W/F per chart review    Visit Vitals  BP (!) 149/103   Pulse 86   Temp 36.3 °C (97.4 °F)   Resp 19        Lab Results   Component Value Date    CREATININE 1.20 12/06/2023    CREATININE 3.5 (H) 08/30/2023    CREATININE 3.3 (H) 08/28/2023    CREATININE 3.6 (H) 08/26/2023    CREATININE 2.7 (H) 08/25/2023        Patient weight is   Wt Readings from Last 1 Encounters:   12/06/23 72.6 kg (160 lb)       No results found for: \"CULTURE\"     No intake/output data recorded.    Temp Readings from Last 1 Encounters:   12/06/23 36.3 °C (97.4 °F)       Assessment/Plan     Patient will not be given a loading dose.  Will initiate vancomycin maintenance, a one time dose of 1000 mg based on a 15 mg/kg dosing regimen.    This dosing regimen is predicted by [employer] to result in the following pharmacokinetic parameters:  N/A; one-time ED consult    Follow-up level will not be ordered.  Follow for continued vancomycin needs, clinical response, and signs/symptoms of toxicity.       Brendon Berg, PharmD  "

## 2023-12-06 NOTE — ED PROVIDER NOTES
Pt endorsed to me by prior physician. Initially sent to ED for eval hypotension. Pt herself denies any complaints. Did reach out to daughter w/ pts permission. Per daughter [facility] pt reportedly w/ hypotension at last 2 HD sessions. Pts nephrologist c/f possible underlying infx though pt states she feels well. No fever / chills. Per daughter nephrology did take Cx from her indwelling catheter as she's had prior line Infx in the past. Of note, pt w/ limited amt fluid taken off her at last 2 HD sessions 2/2 hypotension. Pt is reportedly up 9lbs from her dry weight. Will plan for admit for r/o septicemia, pt started on IV abx, blood Cx ordered. Line itself appears well, no erythem / edema or ttp on exam. Lactate is downtrending. Pt does not meet sepsis criteria. Pt XR does show c/f new L sided pleural effusion. D/w IR who will follow on consult for potential thora. D/w Dr. Madrigal for admit. Nephrology on page for HD needs.      Chase Camp MD  12/07/23 0637

## 2023-12-06 NOTE — H&P
History Of Present Illness  Ayanna Gross is a 73 y.o. female presenting with low blood pressure. States she was sent from dialysis because blood pressure has been low. Denies chest pain, shortness of breath, abdominal pain, lightheadedness, near syncope, fevers, chills, recent illness.      Past Medical History  Past Medical History:   Diagnosis Date    Asthma     CAD (coronary artery disease)     CHF (congestive heart failure) (CMS/[facility])     Chronic kidney disease on chronic dialysis (CMS/[facility])     Hypertension     Personal history of diseases of the blood and blood-forming organs and certain disorders involving the immune mechanism     History of autoimmune disorder    Sleep apnea     Type 2 diabetes mellitus without complications (CMS/[facility]) 09/15/2022    Diabetes mellitus       Surgical History  Past Surgical History:   Procedure Laterality Date    CARPAL TUNNEL RELEASE  11/18/2013    Neuroplasty Decompression Median Nerve At Carpal Tunnel    HAND SURGERY  11/18/2013    Hand Surgery                                                                                                                                                          MASTECTOMY, PARTIAL  04/10/2014    Right Breast Partial Mastectomy    MR HEAD ANGIO WO IV CONTRAST  8/29/2023    MR HEAD ANGIO WO IV CONTRAST [facility]    OTHER SURGICAL HISTORY  11/18/2013    Breast Reconstruction With Implant Prosthesis    OTHER SURGICAL HISTORY  11/18/2013    Thyroid Surgery Substernal Thyroidectomy Partial    OTHER SURGICAL HISTORY  11/18/2013    Modified Radical Mastectomy Left Breast    OTHER SURGICAL HISTORY  04/04/2022    Carpal tunnel surgery    OTHER SURGICAL HISTORY  04/04/2022    Foot surgery    OTHER SURGICAL HISTORY  04/04/2022    Hernia repair    SENTINEL LYMPH NODE BIOPSY  04/10/2014    [place] Lymph Node Biopsy    TONSILLECTOMY  11/18/2013    Tonsillectomy    UMBILICAL HERNIA REPAIR  11/18/2013    Umbilical Hernia Repair    US GUIDED  PERCUTANEOUS PLACEMENT  6/29/2023    US GUIDED PERCUTANEOUS PLACEMENT [facility]        Social History  She reports that she has never smoked. She has never used smokeless tobacco. She reports that she does not currently use alcohol. She reports that she does not use drugs.    Family History  Family History   Problem Relation Name Age of Onset    Lymphoma Mother      Prostate cancer Father          passed away fabiana min 2017        Allergies  Patient has no known allergies.    Review of Systems   Constitutional: Negative.    HENT: Negative.     Eyes: Negative.    Respiratory: Negative.     Cardiovascular: Negative.    Gastrointestinal: Negative.    Endocrine: Negative.    Genitourinary: Negative.    Musculoskeletal: Negative.    Neurological: Negative.    Psychiatric/Behavioral: Negative.          Physical Exam  Constitutional:       Appearance: Normal appearance.   HENT:      Head: Normocephalic and atraumatic.      Mouth/Throat:      Mouth: Mucous membranes are moist.      Pharynx: Oropharynx is clear.   Eyes:      Extraocular Movements: Extraocular movements intact.      Conjunctiva/sclera: Conjunctivae normal.      Pupils: Pupils are equal, round, and reactive to light.   Cardiovascular:      Rate and Rhythm: Normal rate and regular rhythm.      Pulses: Normal pulses.      Heart sounds: Normal heart sounds.   Pulmonary:      Effort: Pulmonary effort is normal.      Breath sounds: Normal breath sounds.   Abdominal:      General: Bowel sounds are normal.      Palpations: Abdomen is soft.      Tenderness: There is no abdominal tenderness.   Musculoskeletal:         General: Normal range of motion.      Cervical back: Normal range of motion and neck supple.   Skin:     General: Skin is warm and dry.      Capillary Refill: Capillary refill takes less than 2 seconds.   Neurological:      General: No focal deficit present.      Mental Status: She is alert and oriented to person, place, and time.  "  Psychiatric:         Mood and Affect: Mood normal.         Behavior: Behavior normal.          Last Recorded Vitals  Blood pressure (!) 149/103, pulse 86, temperature 36.3 °C (97.4 °F), resp. rate 19, height 1.575 m (5' 2\"), weight 72.6 kg (160 lb), SpO2 97 %.    Relevant Results  Results for orders placed or performed during the hospital encounter of 12/06/23 (from the past 24 hour(s))   Basic Metabolic Panel   Result Value Ref Range    Glucose 99 65 - 99 mg/dL    Sodium 136 133 - 145 mmol/L    Potassium 3.8 3.4 - 5.1 mmol/L    Chloride 95 (L) 97 - 107 mmol/L    Bicarbonate 32 (H) 24 - 31 mmol/L    Urea Nitrogen <3 (L) 8 - 25 mg/dL    Creatinine 1.20 0.40 - 1.60 mg/dL    eGFR 48 (L) >60 mL/min/1.73m*2    Calcium 8.6 8.5 - 10.4 mg/dL    Anion Gap 9 <=19 mmol/L   Blood Gas Lactic Acid, Venous   Result Value Ref Range    POCT Lactate, Venous 3.0 (H) 0.4 - 2.0 mmol/L   CBC and Auto Differential   Result Value Ref Range    WBC 11.1 4.4 - 11.3 x10*3/uL    nRBC 0.0 0.0 - 0.0 /100 WBCs    RBC 4.13 4.00 - 5.20 x10*6/uL    Hemoglobin 11.8 (L) 12.0 - 16.0 g/dL    Hematocrit 36.3 36.0 - 46.0 %    MCV 88 80 - 100 fL    MCH 28.6 26.0 - 34.0 pg    MCHC 32.5 32.0 - 36.0 g/dL    RDW 20.6 (H) 11.5 - 14.5 %    Platelets 183 150 - 450 x10*3/uL    Neutrophils % 68.2 40.0 - 80.0 %    Immature Granulocytes %, Automated 0.5 0.0 - 0.9 %    Lymphocytes % 18.3 13.0 - 44.0 %    Monocytes % 5.8 2.0 - 10.0 %    Eosinophils % 6.4 0.0 - 6.0 %    Basophils % 0.8 0.0 - 2.0 %    Neutrophils Absolute 7.57 (H) 1.60 - 5.50 x10*3/uL    Immature Granulocytes Absolute, Automated 0.06 0.00 - 0.50 x10*3/uL    Lymphocytes Absolute 2.03 0.80 - 3.00 x10*3/uL    Monocytes Absolute 0.64 0.05 - 0.80 x10*3/uL    Eosinophils Absolute 0.71 (H) 0.00 - 0.40 x10*3/uL    Basophils Absolute 0.09 0.00 - 0.10 x10*3/uL   Hepatic Function Panel   Result Value Ref Range    AST 42 (H) 5 - 40 U/L    ALT 24 5 - 40 U/L    Alkaline Phosphatase 88 35 - 125 U/L    Bilirubin, Total " 1.0 0.1 - 1.2 mg/dL    Bilirubin, Direct 0.5 (H) 0.0 - 0.2 mg/dL    Total Protein 6.3 5.9 - 7.9 g/dL    Albumin 2.6 (L) 3.5 - 5.0 g/dL   Troponin T   Result Value Ref Range    Troponin T, High Sensitivity 213 (H) <=15 ng/L   Morphology   Result Value Ref Range    RBC Morphology See Below     Spherocytes Few     Target Cells Few     [place] Cells Few     Acanthocytes Few     Clumped Platelets Present    Blood Gas Lactic Acid, Venous   Result Value Ref Range    POCT Lactate, Venous 2.3 (H) 0.4 - 2.0 mmol/L   Troponin T   Result Value Ref Range    Troponin T, High Sensitivity 205 (H) <=15 ng/L     XR chest 1 view    Result Date: 12/6/2023  Interpreted By:  Roger Murdock, STUDY: XR CHEST 1 VIEW;  12/6/2023 2:08 pm   INDICATION: Signs/Symptoms:Brief AMS and hypotension, now resolved.   COMPARISON: 08/21/2023..   ACCESSION NUMBER(S): [id number]   ORDERING CLINICIAN: PATEL TINSLEY   FINDINGS: New moderate left pleural effusion. There is silhouetting of the left heart border. Left subclavian stent present. Right-sided double-lumen central venous catheter terminates within the right ventricle. No acute osseous findings.       New, moderate left pleural effusion.     MACRO: None   Signed by: Roger Murdock 12/6/2023 2:18 PM Dictation workstation:   [id number]        Assessment/Plan   Principal Problem:    Hypotension, unspecified hypotension type   Vital signs per order    Continue midodrine    Continue low-dose Toprol in am    Hold Imdur    Blood cultures and antibiotics ordered in ER    Fall precautions   Active Problems:    Paroxysmal atrial fibrillation (CMS/HCC)   Continue low-dose beta blocker    Hold Eliquis for possible thoracentesis     Peripheral vascular disease (CMS/HCC)   Stable     Orthostatic hypotension   Continue midodrine    Check orthostatic vital signs     Mixed hyperlipidemia   Continue statin, fenofibrate     End-stage renal disease on hemodialysis (CMS/HCC)   Consult Dr. Mondragon    Continue Phoslo    Check AM  labs     DM (diabetes mellitus) (CMS/[facility])   Hold Tradjenta    AC/HS glucose with SSI coverage    Hypoglycemia protocol         Susan Rashid, APRN-CNP

## 2023-12-06 NOTE — ED PROVIDER NOTES
EMERGENCY DEPARTMENT ENCOUNTER      [ ] CODE STEMI [ ] CODE Neuro [ ] CODE Yellow [ ] Modified Trauma [ ] CODE Blue      CHIEF COMPLAINT      Chief Complaint   Patient presents with    Hypotension       Mode of Arrival:Ambulance  Primary Care Provider: Deisy Conley MD  Medical Record Number: [id number]      History obtained by: Patient  Limited by nothing  Time seen: 2:12 PM    QUALITY MEASURES   PPE Utilized: N95 with goggles and gloves      HPI      Ayanna Gross is a 73 y.o. female with a history of ESRD Monday Wednesday Friday, CHF, CAD, diabetes, hypertension, hyperlipidemia, just finished dialysis today but then it was reported that family thought patient appeared slightly altered and staff reported a blood pressure of 60/30 so had ambulance to take patient to the emergency room.  At bedside patient's blood pressure initially read as 140/80 and patient states that she feels fine and does not feel confused ANO x 3 and able to answer all questions states she has not been ill of late with any fever chills cough or sore throat chest pain or shortness of breath or abdominal pain.  Only occasionally produces urine.  Has not noticed any abnormalities today.  Thought she may have felt lightheaded right at the end of dialysis but otherwise feels fine now.  No other complaints.    Patient otherwise denies fever, chills, n/v/d, chest pain, shortness of breath, sore throat, cough, rhinorrhea, abdominal pain, dysuria, hematuria, swollen extremities or skin changes, hematemesis, hematochezia, melena or any other accompanying symptoms of late.      The patient has no other complaints at this time.               PAST MEDICAL HISTORY    Past Medical History:   Diagnosis Date    Infectious mononucleosis, unspecified without complication     Mononucleosis    Personal history of diseases of the blood and blood-forming organs and certain disorders involving the immune mechanism     History of autoimmune disorder    Personal  history of malignant neoplasm of breast 04/30/2015    History of malignant neoplasm of breast    Personal history of malignant neoplasm of other organs and systems     History of malignant neoplasm of lymph node    Personal history of other diseases of the circulatory system     History of hypertension    Personal history of other diseases of the nervous system and sense organs     History of sleep apnea    Personal history of other diseases of the respiratory system     History of asthma    Personal history of other diseases of urinary system     History of kidney disease    Personal history of other endocrine, nutritional and metabolic disease     History of thyroid disorder    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  Yvon Mcfadden MD    SURGICAL HISTORY    Past Surgical History:   Procedure Laterality Date    CARPAL TUNNEL RELEASE  11/18/2013    Neuroplasty Decompression Median Nerve At Carpal Tunnel    HAND SURGERY  11/18/2013    Hand Surgery                                                                                                                                                          MASTECTOMY, PARTIAL  04/10/2014    Right Breast Partial Mastectomy    MR HEAD ANGIO WO IV CONTRAST  8/29/2023    MR HEAD ANGIO WO IV CONTRAST [facility]    OTHER SURGICAL HISTORY  11/18/2013    Breast Reconstruction With Implant Prosthesis    OTHER SURGICAL HISTORY  11/18/2013    Thyroid Surgery Substernal Thyroidectomy Partial    OTHER SURGICAL HISTORY  11/18/2013    Modified Radical Mastectomy Left Breast    OTHER SURGICAL HISTORY  04/04/2022    Carpal tunnel surgery     OTHER SURGICAL HISTORY  04/04/2022    Foot surgery    OTHER SURGICAL HISTORY  04/04/2022    Hernia repair    SENTINEL LYMPH NODE BIOPSY  04/10/2014    [place] Lymph Node Biopsy    TONSILLECTOMY  11/18/2013    Tonsillectomy    UMBILICAL HERNIA REPAIR  11/18/2013    Umbilical Hernia Repair    US GUIDED PERCUTANEOUS PLACEMENT  6/29/2023    US GUIDED PERCUTANEOUS PLACEMENT [facility]       I have personally reviewed the patient's past surgical history in the records.  Yvon Mcfadden MD    CURRENT MEDICATIONS    I have reviewed the patient’s medications.   Please see nursing and pharmacy records for the most up to date list.     [unfilled]    ALLERGIES    No Known Allergies    I have personally reviewed the patient's past history of allergies in the records.  Yvon Mcfadden MD    FAMILY HISTORY    Family History   Problem Relation Name Age of Onset    Lymphoma Mother      Prostate cancer Father          passed away fabiana pako 2017       I have personally reviewed the patient's family history in the records.  Yvon Mcfadden MD    SOCIAL HISTORY    Social History     Socioeconomic History    Marital status:      Spouse name: Not on file    Number of children: Not on file    Years of education: Not on file    Highest education level: Not on file   Occupational History    Not on file   Tobacco Use    Smoking status: Not on file    Smokeless tobacco: Not on file   Substance and Sexual Activity    Alcohol use: Not on file    Drug use: Not on file    Sexual activity: Not on file   Other Topics Concern    Not on file   Social History Narrative    Not on file     Social Determinants of Health     Financial Resource Strain: Not on file   Food Insecurity: Not on file   Transportation Needs: Unmet Transportation Needs (11/19/2023)    OASIS : Transportation     Lack of Transportation (Medical): No     Lack of Transportation (Non-Medical): Yes     Patient Unable or Declines to Respond: No   Physical Activity: Not on  "file   Stress: Not on file   Social Connections: Feeling Socially Integrated (11/19/2023)    OASIS : Social Isolation     Frequency of experiencing loneliness or isolation: Rarely   Intimate Partner Violence: Not on file   Housing Stability: Not on file         I have personally reviewed the patient's social history in the records.  Yvon Mcfadden MD    REVIEW OF SYSTEMS      14 point ROS was reviewed and negative except as noted above in HPI.      PHYSICAL EXAM    VITAL SIGNS:    BP 85/70   Pulse 81   Temp 36.3 °C (97.4 °F)   Resp 20   Ht 1.575 m (5' 2\")   Wt 72.6 kg (160 lb)   SpO2 95%   BMI 29.26 kg/m²   initial blood pressure 149/70 repeat approximately 60/30  Review EMR for vital signs  Nursing note and vitals reviewed.    Constitutional:  Alert and oriented, well-developed, well-nourished, appears stated age, non-toxic appearing  HENT:  Normocephalic, atraumatic, bilateral external ears normal, oropharynx moist, Nose normal.  Neck: normal range of motion, no tenderness, supple, no stridor.  Eyes:  PERRL, EOMI, conjunctiva normal, no discharge.   Cardiovascular:  Normal heart rate, normal rhythm, no murmurs, no rubs, no gallops.   Respiratory:  Normal breath sounds, no respiratory distress, no wheezing, no chest wall tenderness.   GI:  Bowel sounds normal, soft, no tenderness, no rebound or guarding, no distention, no masses pulsatile or otherwise   (any female  exam was done with female chaperone present):   Deferred  Integument:  Warm, dry, no erythema, no rash, no edema.   Back:  No midline tenderness, no CVA tenderness.   Musculoskeletal:  Intact distal pulses, no tenderness, no cyanosis, no clubbing, with capillary refill less than 2 seconds. Good range of motion in all major joints. No tenderness to palpation or major deformities noted.    Neurologic:  Alert & oriented x 3, normal motor function, normal sensory function, no focal deficits noted. Cranial nerves II-XII intact.  Psychiatric:  " Affect normal, judgment normal, mood normal.     EKG    Performed at    1347, HR of    89  ,     NSR, RBBB, NAD, no sign of STEMI or NSTEMI, no Q wave or T wave abnormality noted.  Low voltage noted    Reviewed and interpreted by me at time performed      Reviewed and interpreted by me, Yvon Mcfadden MD        CARDIAC MONITORING    Cardiac monitor reveals normal sinus rhythm as interpreted by me.   Cardiac monitor was ordered secondary to patient's history of chest pain/palpitations/near-syncope/syncope/sob/stroke and to monitor the patient for dysrhythmia.      RADIOLOGY  XR chest 1 view    (Results Pending)       All Imaging studies evaluated and interpreted by ED physician except when noted otherwise.    ED PROVIDER INTERPRETATION (XRAYS ONLY):       *I have interpreted the x-ray real-time in the ED myself, and made a clinical decision on it prior to the formal radiology reading.    Yvon Mcfadden M.D.    RADIOLOGIST IMPRESSION (U/S, CT, MRI):   XR chest 1 view    (Results Pending)         PERTINENT LABS    Please refer to the chart for all lab work and to MDM for relevant discussion.      PROCEDURE    None (procdoc)    ED COURSE & MEDICAL DECISION MAKING    Pertinent Labs & Imaging studies reviewed. (See chart for details)    MDM:    Assessment: Ayanna Gross is a 73 y.o.female who presents to the ED with report of hypotension and confusion at the end of dialysis could be due to disequilibrium syndrome but ultimately repeat blood pressure appears to be within normal's with patient able to answer all questions.  However on manual pressure noted to be 60/30 says a precaution will give 500 cc of fluid but also obtain a basic workup chest x-ray EKG CBC chemistry hepatic function panel troponin and will reassess based on results.  Patient understands and agrees with plan.        Prior records in EPIC reviewed by me.     2023 Coding Requirements:  --Independent historian(s):    see HPI  --Review of prior records:    EHR  "reviewed   --Relevant comorbidities:    see records  --Social determinants of health:         I have considered the following conditions in my assessment of this patient's weakness: Stroke, TIA, electrolyte abnormalities (hypo/hyperkalemia, hypo/hypernatremia, hypo/hyperglycemia, hypomagnesemia, hypo/hypercalcemia), volume depletion, anemia (due to iron deficiency, GIB or other blood loss, Sickle Cell Disease, chronic renal failure, hemolysis, anemia of chronic disease), peripheral neuropathy, motor neuron disease, metabolic causes (hypo/hyperthyroidism, Cushings Syndrome, Dooly's Disease, DKA, Hyperosmolar hyperglycemic state), infectious etiology (sepsis, pneumonia, UTI, Botulism, infectious mononucleosis), Guillain [place], muscular dystrophy, Polymyositis, progressive muscular atrophy, myasthenia gravis, cord compression (spinal stenosis, cauda equina, traumatic injuries to the spine), toxins (paralytic shell fish poisoning, alcohol intoxication, GHB, medication side effect.    EKG within normal as per troponin noted to be a little bit over 200 however suspect that this is secondary to ESRD but patient states currently that she feels fine otherwise.  Will consider repeat troponin however.  Lactic acid 3.  Chemistry shows creatinine at 1.2, slightly elevated bicarbonate.  After approximately half of the 500 cc bolus blood pressure has improved to 85/70.  Chest x-ray pending    Signed out to Dr. Camp pending repeat troponin and reassessment            ED VITALS  Vitals:    12/06/23 1301 12/06/23 1400   BP: (!) 145/96 85/70   Pulse: 81    Resp: 20    Temp: 36.3 °C (97.4 °F)    SpO2: 95%    Weight: 72.6 kg (160 lb)    Height: 1.575 m (5' 2\")        BP  Min: 85/70  Max: 145/96    As part of the 2022 [facility] reporting requirements, the following measures have been reviewed and documented:    None     1. Hypotension, unspecified hypotension type    2. History of end stage renal disease        Diagnoses as of 12/06/23 " 1412   Hypotension, unspecified hypotension type   History of end stage renal disease         DISPOSITION       Transfer of care    Physician signing out: Bogdan      Accepting physician: Conrado      Signout time: 230 pm      See HPI, physical exam and records for the rest of the history      Pending: Repeat troponin and reassessment and final disposition          Electronically signed by MD Yvon Meng MD  12/06/23 1412

## 2023-12-07 PROBLEM — R09.89 ABSENT PEDAL PULSES: Chronic | Status: ACTIVE | Noted: 2023-01-01
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
## 2023-12-07 NOTE — PROGRESS NOTES
Physical Therapy    Physical Therapy Evaluation    Patient Name: Ayanna Gross  MRN: [id number]  Today's Date: 12/7/2023   Time Calculation  Start Time: 1333  Stop Time: 1347  Time Calculation (min): 14 min    Assessment/Plan   PT Assessment  PT Assessment Results: Decreased strength, Decreased range of motion, Decreased endurance, Impaired balance, Decreased mobility, Decreased coordination, Impaired judgement, Decreased safety awareness, Impaired sensation, Decreased skin integrity  Rehab Prognosis: Fair  Evaluation/Treatment Tolerance: Patient limited by fatigue, Patient limited by pain  Medical Staff Made Aware: Yes  Barriers to Participation: Ability to acquire knowledge, Comorbidities  End of Session Communication: Bedside nurse  Assessment Comment: pt with decreased functional mobility, impaired skin integrity, decreased ROM and strength B LEs, poor safety awareness, and limited tolerance to activity. pt will benefit from continued skilled therapy services to improve functional performance and maximize safety.  End of Session Patient Position: Bed, 3 rail up, Alarm on  IP OR SWING BED PT PLAN  Inpatient or Swing Bed: Inpatient  PT Plan  Treatment/Interventions: Bed mobility, Transfer training, Balance training, Strengthening, Endurance training, Therapeutic exercise, Therapeutic activity  PT Plan: Skilled PT  PT Frequency: 3 times per week  PT Discharge Recommendations: Moderate intensity level of continued care  Equipment Recommended upon Discharge: Wheelchair  PT Recommended Transfer Status: Total assist, Assist x2  PT - OK to Discharge: Yes      Subjective   General Visit Information:  General  Reason for Referral: Impaired Mobility  Referred By: MADHURI Gaona-CNP  Past Medical History Relevant to Rehab: ESRD on HD, PAF, PVD, HLD, DM, pnuemonia, skin CA, Breast CA, gout, depression, CVA, multiple foot wounds  Co-Treatment: OT  Co-Treatment Reason: partial co-evaluation during transfers for  patient and caregiver safety.  Prior to Session Communication: Bedside nurse  Patient Position Received: Bed, 3 rail up  Preferred Learning Style: verbal  General Comment: Patient is an 89 year old female who was brought to the ED with hypotension while at dialysis. Chest xray completed indicating new, moderate pleural effusion  Home Living:  Home Living  Type of Home: House  Lives With: Significant other  Home Adaptive Equipment: Wheelchair-manual, Walker rolling or standard, Other (Comment)  Home Layout: One level, Able to live on main level with bedroom/bathroom  Home Access: Ramped entrance  Bathroom Shower/Tub: Walk-in shower  Bathroom Toilet: Handicapped height  Bathroom Equipment: Grab bars in shower, Shower chair with back  Home Living Comments: patient is a questionable historian  Prior Level of Function:  Prior Function Per Pt/Caregiver Report  Level of [place]: Needs assistance with ADLs, Needs assistance with homemaking  Receives Help From: Family  ADL Assistance: Needs assistance  Homemaking Assistance: Needs assistance  Driving/Transportation: Public transportation  Ambulatory Assistance: Needs assistance  Transfers: Independent, Assistive device  Gait: Assistive device (with wheelchair)  Prior Function Comments: patient reports that she is independence with ADLs, except her daughters come to assist with jeaneth hygiene tasks.  Precautions:  Precautions  Hearing/Visual Limitations: wears glasses  Medical Precautions: Fall precautions, Infection precautions  Precautions Comment: Cdiff+    Objective   Pain:  Pain Assessment  Pain Score: 10 - Worst possible pain  Pain Type: Acute pain  Pain Location: Buttocks (and periarea)  Pain Interventions: Repositioned  Cognition:  Cognition  Overall Cognitive Status: Impaired  Orientation Level: Oriented X4  Insight: Moderate  Impulsive: Mildly    General Assessments:  General Observation  General Observation: pt with a waffle mattress on the bed. wearing B  PRAFOS, feet wrapped in gauze, significant wounds B heels. excoriated/red/swollen periarea and buttocks.     Activity Tolerance  Activity Tolerance Comments: poor, limited by pain from poor skin integrity    Sensation  Sensation Comment: numbness and tingling B feet    Coordination  Coordination Comment: increased time and effort to complete tasks, poor movement quality    Postural Control  Posture Comment: slouched in sitting, rounded shoulders, forward head posture    Static Sitting Balance  Static Sitting-Balance Support: Bilateral upper extremity supported  Static Sitting-Level of Assistance: Moderate assistance  Static Sitting-Comment/Number of Minutes: sat EOB x 30 saeconds before asking to return to supine  Functional Assessments:  ADL  ADL's Addressed: No    Bed Mobility  Bed Mobility: Yes  Bed Mobility 1  Bed Mobility 1: Supine to sitting  Level of Assistance 1: Maximum assistance, Moderate verbal cues (x2, log rolling to left side due to poor skin integrity)  Bed Mobility Comments 1: HOB elevated, use of bed rails, max A x 2 to lower B LEs and elevate trunk.  Bed Mobility 2  Bed Mobility  2: Sitting to supine  Level of Assistance 2: Maximum assistance, Moderate verbal cues, Maximum tactile cues (x2)  Bed Mobility Comments 2: max A x 2 for trunk and B LE management. boosted to HOB using the draw sheet and dependent x 2 to lift bosy off bed so not to shear skin.    Transfers  Transfer: No  Extremity/Trunk Assessments:  RLE   RLE : Exceptions to WFL  AROM RLE (degrees)  RLE AROM Comment: lacks significant amount of ankle DF/PF, decreased full knee flexion.  Strength RLE  RLE Overall Strength: Deficits (poor movement quality, unable to isolate joint movements, very weak, >3+/5)  LLE   LLE : Exceptions to WFL  AROM LLE (degrees)  LLE AROM Comment: very little available ROM left ankle, lacks full knee flexion  Strength LLE  LLE Overall Strength: Deficits (very weak and uncoordinated with movement, strength  >3-/5)  Outcome Measures:  [facility] Basic Mobility  Turning from your back to your side while in a flat bed without using bedrails: Total  Moving from lying on your back to sitting on the side of a flat bed without using bedrails: Total  Moving to and from bed to chair (including a wheelchair): Total  Standing up from a chair using your arms (e.g. wheelchair or bedside chair): Total  To walk in hospital room: Total  Climbing 3-5 steps with railing: Total  Basic Mobility - Total Score: 6    Encounter Problems       Encounter Problems (Active)       Balance       STG - Maintains static sitting balance with B upper extremity support on the edge of the bed x 15 minutes  (Progressing)       Start:  12/07/23    Expected End:  12/31/23       INTERVENTIONS:  1. Practice sitting on the edge of a bed/mat with minimal support.  2. Educate patient about pressure relief.              Transfers       STG - Patient to transfer to and from sit to supine with mod A via log rolling for skin protection. (Progressing)       Start:  12/07/23    Expected End:  12/31/23            STG - Patient will perform bed mobility with min A to maintain pressure relief and prevent further skin integrity issues. (Progressing)       Start:  12/07/23    Expected End:  12/31/23            STG - Patient will roll from side to side using the bed rail to assist with hygiene and pressure relief with min A. (Progressing)       Start:  12/07/23    Expected End:  12/31/23                   Education Documentation  Mobility Training, taught by Shauna Laguerre PT at 12/7/2023  3:49 PM.  Learner: Patient  Readiness: Acceptance  Method: Explanation  Response: Verbalizes Understanding    Education Comments  No comments found.
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
## 2023-12-07 NOTE — CONSULTS
"Nutrition Assessment Note  Met with Pt to assess and provide recommendations. Pt reports \"so-so\" appetite for a couple weeks, and is down 7# from UBW. Pt has noted wounds and Pt is agreeable to Christian BID to promote wound healing, and for Mightyshake BID.    Nutrition Assessment    Reason for Assessment: Provider consult order (MST 2 \"Possible for wound healing\")    Reason for Hospital Admission:  Ayanna Gross is a 73 y.o. female who is admitted for Hypotension, unspecified hypotension type.    Nutrition History:  Food and Nutrient History: Pt reports having a \"so-so\" appetite for a couple weeks. Pt reports not having eaten yet, but was waiting on a meal tray her daughter ordered with her.  Energy Intake: Fair 50-75 %      Anthropometrics:  Ht: 157.5 cm (5' 2\"), Wt: 72.6 kg (160 lb), BMI: 29.26  IBW/kg (Dietitian Calculated): 50 kg  Percent of IBW: 145 %  Adjusted Body Weight (kg): 55.7 kg    Weight Change:  Weight History / % Weight Change: Pt unsure of any weight changes, but reports her #. Possible 7# weight loss in 2 weeks.  Significant Weight Loss: Yes  Interpretation of Weight Loss: 1-2% in 1 week (~4% in 2 weeks)       Nutrition Focused Physical Exam Findings:   Subcutaneous Fat Loss  Buccal Fat Pads: Well nourished (full, rounded cheeks)  Triceps: Mild-moderate (less than ample fat tissue)    Muscle Wasting  Temporalis: Well nourished (well-defined muscle)  Pectoralis (Clavicular Region): Well nourished (clavicle not visible)  Deltoid/Trapezius: Mild-Moderate (slight protrusion of acromion process)         Physical Findings (Nutrition Deficiency/Toxicity)  Skin:  (12/7 0300 RN noted scattered scabs at various stages of healing.)    Nutrition Significant Labs:  Lab Results   Component Value Date    WBC 11.7 (H) 12/07/2023    HGB 11.4 (L) 12/07/2023    HCT 32.1 (L) 12/07/2023     12/07/2023    CHOL 104 (L) 07/01/2023    TRIG 155 (H) 07/01/2023    HDL 30 (L) 07/01/2023    ALT 21 12/07/2023    AST " 40 12/07/2023     12/07/2023    K 4.1 12/07/2023    CL 98 12/07/2023    CREATININE 1.90 (H) 12/07/2023    BUN 6 (L) 12/07/2023    CO2 25 12/07/2023    TSH 1.56 02/04/2020    INR 1.1 08/20/2023    HGBA1C 8.8 (H) 06/25/2023    ALBUR 1,315 (H) 02/24/2019       Current Facility-Administered Medications:     acetaminophen (Tylenol) tablet 650 mg, 650 mg, oral, q4h PRN, LILLIE Gaona    acetaminophen (Tylenol) tablet 650 mg, 650 mg, oral, q4h PRN, LILLIE Gaona    [Held by provider] apixaban (Eliquis) tablet 2.5 mg, 2.5 mg, oral, BID, LILLIE Gaona    calcium acetate (Phoslo) capsule 667 mg, 667 mg, oral, TID with meals, LILLIE Gaona, 667 mg at 12/07/23 1242    dextrose 10 % in water (D10W) infusion, 0.3 g/kg/hr, intravenous, Once PRN, LILLIE Gaona    dextrose 50 % injection 25 g, 25 g, intravenous, q15 min PRN, LILLIE Gaona    escitalopram (Lexapro) tablet 10 mg, 10 mg, oral, Daily, LILLIE Gaona, 10 mg at 12/07/23 0904    febuxostat (Uloric) tablet 40 mg, 40 mg, oral, Every other day, LILLIE Gaona, 40 mg at 12/07/23 1242    fenofibrate (Triglide) tablet 160 mg, 160 mg, oral, Daily, LILLIE Gaona, 160 mg at 12/07/23 0904    gabapentin (Neurontin) capsule 300 mg, 300 mg, oral, BID, LILLIE Gaona, 300 mg at 12/07/23 0904    glucagon (Glucagen) injection 1 mg, 1 mg, intramuscular, q15 min PRN, LILLIE Gaona    insulin lispro (HumaLOG) injection 0-5 Units, 0-5 Units, subcutaneous, TID with meals, LILLIE Gaona    metoprolol succinate XL (Toprol-XL) 24 hr tablet 12.5 mg, 12.5 mg, oral, Daily, LILLIE Gaona, 12.5 mg at 12/07/23 1114    midodrine (Proamatine) tablet 10 mg, 10 mg, oral, TID with meals, LILLIE Gaona, 10 mg at 12/07/23 1242    nystatin (Mycostatin) 100,000 unit/gram powder 1 Application, 1  Application, Topical, BID, LILLIE Gaona, 1 Application at 12/07/23 0914    polyethylene glycol (Glycolax, Miralax) packet 17 g, 17 g, oral, Daily, LILLIE Gaona    simvastatin (Zocor) tablet 40 mg, 40 mg, oral, Nightly, LILLIE Gaona, 40 mg at 12/07/23 0008    zinc oxide 20 % ointment 1 Application, 1 Application, Topical, q1h PRNDhiraj MD    Dietary Orders (From admission, onward)       Start     Ordered    12/06/23 2125  Adult diet Renal; Potassium Restricted 2 gm (50mEq); 2 - 3 grams Sodium  Diet effective now        Question Answer Comment   Diet type Renal    Potassium restriction: Potassium Restricted 2 gm (50mEq)    Sodium restriction: 2 - 3 grams Sodium        12/06/23 2124                  Estimated Needs:   Estimated Energy Needs  Total Energy Estimated Needs (kCal):  (5104-5464 kcals)  Total Estimated Energy Need per Day (kCal/kg):  (30-35 kcals/kg)  Method for Estimating Needs: adjusted IBW    Estimated Protein Needs  Total Protein Estimated Needs (g):  (67-100g Pro)  Total Protein Estimated Needs (g/kg):  (1.2-1.8 g/kg)  Method for Estimating Needs: adjusted IBW    Estimated Fluid Needs  Total Fluid Estimated Needs (mL):  (7525-0522 mL)  Method for Estimating Needs: 1 mL/kcal  __        Nutrition Diagnosis   Nutrition Diagnosis:  Malnutrition Diagnosis  Patient has Malnutrition Diagnosis: Yes  Diagnosis Status: New  Malnutrition Diagnosis: Moderate malnutrition related to acute disease or injury  As Evidenced by: 1-2% unintentional wt loss in 1 week (~4% in 2 weeks), Poor intake <75% of estimated energy requirement > 7days, and mild fat and muscle deficits    __       Nutrition Interventions/Recommendations   Nutrition Interventions and Recommendations:    Nutrition Prescription:  Individualized Nutrition Prescription Provided for : 1701-1269 kcals, 67-100g Pro, 8129-7411 mL fluid with prescribed diet and oral nutrition supplements    Nutrition  Interventions:   Food and/or Nutrient Delivery Interventions  Interventions: Meals and snacks, Medical food supplement  Meals and Snacks: Mineral-modified diet, Other (Comment) (Renal Diet)  Goal: Diet as ordered  Medical Food Supplement: Commercial beverage  Goal: Christian BID, Provides 90 kcals and 2.5 g Pro per packet  Additional Interventions: Mightyshake BID, provides 200 kcals and 7 g Pro per carton    Education Documentation  No documentation found.      __       Nutrition Monitoring and Evaluation   Monitoring/Evaluation:   Food/Nutrient Related History Monitoring  Monitoring and Evaluation Plan: Energy intake  Energy Intake: Estimated energy intake  Criteria: Pt will tolerate >75% of PO intake with meals, snacks, and supplements    Body Composition/Growth/Weight History  Monitoring and Evaluation Plan: Weight change  Weight Change: Weight change percentage  Criteria: Pt will maintain/gain weight    Nutrition Focused Physical Findings  Monitoring and Evaluation Plan: Skin  Skin: Impaired wound healing  Criteria: Pt will maintain/show signs of improvement in skin integrity    __       Time Spent/Follow-up:   Follow Up  Last Date of Nutrition Visit: 12/07/23  Nutrition Follow-Up Needed?: 3-5 days  Follow up Comment: 12/12/2023

## 2023-12-07 NOTE — PROGRESS NOTES
Brief nephrology note:  She was admitted from her hemodialysis unit due to progressive hypotension, initially asymptomatic, thereafter mental status changes.  Looks like she has a new onset pleural effusion, C. difficile colitis, on the backdrop of chronic endocarditis.  She had been on prolonged antibiotic therapy.  Plan on hemodialysis tomorrow.  I do not think she needs to transfer to the unit for pressor support at present.  Maintain midodrine.  Use low temperature dialysate with minimal fluid removal, if any.  She was off the floor getting her ultrasound/thoracentesis when I came by to evaluate her.  Full consultation note to follow.  If she needs anything in the way of volume expansion, would favor colloid expansion with albumin as opposed to crystalloid expansion with saline.  She has a very low serum albumin level.  Recommend 25 g of IV albumin 25% every 6 hours as needed systolic blood pressure less than 80.

## 2023-12-07 NOTE — PROGRESS NOTES
Occupational Therapy    Evaluation/Treatment    Patient Name: Ayanna Gross  MRN: [id number]  : 1950  Today's Date: 23  Time Calculation  Start Time: 1309  Stop Time: 1338  Time Calculation (min): 29 min       Assessment:  OT Assessment: Patient is a 73 year old female admitted with hypotension. Patient is presenting with a decline in strength, balance, activity tolerance, and function, resulting in an increased need for assistance with ADL tasks. Skilled OT to address the above deficits and increase patient's safety and independence with daily tasks.  Prognosis: Good  Evaluation/Treatment Tolerance: Patient limited by pain  End of Session Communication: Bedside nurse  End of Session Patient Position: Bed, 3 rail up, Alarm on  OT Assessment Results: Decreased ADL status, Decreased upper extremity strength, Decreased safe judgment during ADL, Decreased endurance, Decreased functional mobility, Decreased gross motor control  Prognosis: Good  Evaluation/Treatment Tolerance: Patient limited by pain  Plan:  Treatment Interventions: ADL retraining, Functional transfer training, UE strengthening/ROM, Endurance training, Patient/family training, Compensatory technique education  OT Frequency: 3 times per week  OT Discharge Recommendations: Moderate intensity level of continued care  OT Recommended Transfer Status: Dependent  OT - OK to Discharge: Yes  Treatment Interventions: ADL retraining, Functional transfer training, UE strengthening/ROM, Endurance training, Patient/family training, Compensatory technique education    Subjective   Current Problem:  1. Hypotension, unspecified hypotension type        2. History of end stage renal disease        3. Wound infection        4. Absent pedal pulses  XR foot 3+ views bilateral    Vascular US PVR without exercise    Vascular US ankle brachial index (OCTAVIO) without exercise    XR foot 3+ views bilateral    Vascular US PVR without exercise    Vascular US ankle brachial  index (OCTAVIO) without exercise        General:   OT Received On: 12/07/23  General  Reason for Referral: decline in ADLs  Referred By: Dr. Madrigal  Past Medical History Relevant to Rehab: Patient is an 89 year old female who was brought to the ED with hypotension. Chest xray completed indicating new, moderate pleural effusion. Patient is admitted with hypotension, a-fib paroxysmal, PVD, orthostatic hypotension, mixed hyperlipidemia, ESRD, DM. PMH: syncope, PNA, actinic keratosis, skin abnormality.  Co-Treatment: PT  Co-Treatment Reason: safety with functional txfer to EOB due to patient's wounds in jeaneth area  Prior to Session Communication: Bedside nurse  Patient Position Received: Bed, 3 rail up  Preferred Learning Style: verbal  General Comment: Patient is cleared by nursing for therapy. Patient in bed upon arrival, agreeable to participate  Precautions:  Medical Precautions: Fall precautions, Infection precautions (r/o cdiff)  Pain:  Pain Assessment  Pain Assessment: 0-10  Pain Score: 10 - Worst possible pain  Pain Type: Acute pain  Pain Location: Buttocks (and jeaneth area)  Pain Interventions: Repositioned    Objective   Cognition:  Overall Cognitive Status: Impaired (questionable historian)  Orientation Level: Oriented X4  Insight: Moderate  Impulsive: Mildly     Home Living:  Type of Home: House  Lives With: Significant other  Home Adaptive Equipment: Wheelchair-manual, Walker rolling or standard, Other (Comment) (hospital bed)  Home Layout: One level, Able to live on main level with bedroom/bathroom (hospital bed in living room)  Home Access: Ramped entrance  Bathroom Shower/Tub: Walk-in shower  Bathroom Toilet: Handicapped height  Bathroom Equipment: Grab bars in shower, Shower chair with back  Home Living Comments: patient is a questionable historian  Prior Function:  Level of Shannon: Needs assistance with ADLs, Needs assistance with homemaking  Receives Help From: Family (daughters)  ADL Assistance: Needs  assistance  Homemaking Assistance: Needs assistance  Driving/Transportation: Public transportation  Shopping:  ( completes)  Ambulatory Assistance: Needs assistance  Transfers: Independent, Assistive device (primarily w/c)  Gait: Assistive device (w/c)  Prior Function Comments: patient reports that she is independence with ADLs, except her daughters come to assist with jeaneth hygiene tasks. (questionable historian)  IADL History:  Homemaking Responsibilities: No  IADL Comments: family completes IADLs  ADL:  Eating Assistance: Stand by  Eating Deficit: Setup (per clinical judgement)  Grooming Assistance: Minimal  Grooming Deficit: Increased time to complete, Supervision/safety, Verbal cueing, Setup (per clinical judgement)  Bathing Assistance: Maximal  Bathing Deficit: Left upper leg, Right upper leg, Right lower leg including foot, Left lower leg including foot, Buttocks, Perineal area, Steadying, Supervision/safety, Increased time to complete   UE Dressing Assistance: Moderate  UE Dressing Deficit: Thread RUE, Thread LUE, Setup  LE Dressing Assistance: Total  LE Dressing Deficit:  (don kale boots)  Toileting Assistance with Device: Total  Toileting Deficit: Incontinent, Perineal hygiene  Activities of Daily Living: UE Bathing  UE Bathing Level of Assistance: Maximum assistance  UE Bathing Where Assessed: Bed level  UE Bathing Comments: sponge bath    LE Bathing  LE Bathing Level of Assistance: Dependent  LE Bathing Where Assessed: Bed level  LE Bathing Comments: wash B LE    UE Dressing  UE Dressing Level of Assistance: Moderate assistance  UE Dressing Where Assessed: Bed level  UE Dressing Comments: doff/don gown    LE Dressing  LE Dressing: Yes  Sock Level of Assistance: Dependent  LE Dressing Where Assessed: Bed level  LE Dressing Comments: don kale boots    Toileting  Toileting Level of Assistance: Dependent  Where Assessed: Bed level  Toileting Comments: jeaneth hygiene tasks  Activity Tolerance:  Endurance:  Decreased tolerance for upright activites    Bed Mobility/Transfers: Bed Mobility  Bed Mobility: Yes  Bed Mobility 1  Bed Mobility 1: Rolling right, Rolling left  Level of Assistance 1: Maximum assistance  Bed Mobility Comments 1: with use of bed rails  Bed Mobility 2  Bed Mobility  2: Supine to sitting  Level of Assistance 2: Maximum assistance  Bed Mobility Comments 2: via log roll to reduce risk for skin shearing. assist to rasie trunk and move B LE  Bed Mobility 3  Bed Mobility 3: Sitting to supine  Level of Assistance 3: Maximum assistance  Bed Mobility Comments 3: assist to raise B LE and support trunk    Transfers  Transfer: No (patient unable to stand this date)    Sensation:  Sensation Comment: jeff reports numbness/tingling in her feet  Strength:  Strength Comments: B UE at least >/= 3/5 grossly. observed functionally  Extremities: RUE   RUE : Within Functional Limits (observed functionally) and LUE   LUE: Within Functional Limits (observed functionally)    Outcome Measures: [facility] Daily Activity  Putting on and taking off regular lower body clothing: Total  Bathing (including washing, rinsing, drying): Total  Putting on and taking off regular upper body clothing: A lot  Toileting, which includes using toilet, bedpan or urinal: Total  Taking care of personal grooming such as brushing teeth: A lot  Eating Meals: A little  Daily Activity - Total Score: 10    Education Documentation  Body Mechanics, taught by Zarina Baez OT at 12/7/2023  2:12 PM.  Learner: Patient  Readiness: Acceptance  Method: Explanation, Demonstration  Response: Needs Reinforcement    Precautions, taught by Zarina Baez OT at 12/7/2023  2:12 PM.  Learner: Patient  Readiness: Acceptance  Method: Explanation, Demonstration  Response: Needs Reinforcement    ADL Training, taught by Zarina Baez OT at 12/7/2023  2:12 PM.  Learner: Patient  Readiness: Acceptance  Method: Explanation, Demonstration  Response: Needs  Reinforcement    Education Comments  No comments found.      Goals:  Encounter Problems       Encounter Problems (Active)       OT Goals       ADLs (Progressing)       Start:  12/07/23    Expected End:  01/04/24       Patient will complete ADL tasks with Mod I, using AE as needed, in order to increase patient's safety and independence with self-care tasks.         Functional Transfers (Progressing)       Start:  12/07/23    Expected End:  01/04/24       Patient will complete functional transfers with Close Supervision in order to increase safety and independence with daily tasks.         B UE Strengthening (Progressing)       Start:  12/07/23    Expected End:  01/04/24       Patient will increase B UE strength to 4/5 for functional transfers.         Standing Tolerance (Progressing)       Start:  12/07/23    Expected End:  01/04/24       Patient will demonstrate the ability to stand at least >/= 1 minutes with Fair+ balance in order to increase patient's safety and independence with functional transfers.

## 2023-12-07 NOTE — NURSING NOTE
Assumed care of patient, pt awake in room somewhat oriented, says she lives at home with her  but does not know if she has any outside help to take care of her. Pt has no pain at this time, bed low and locked call light within reach

## 2023-12-07 NOTE — CARE PLAN
Problem: Diabetes  Goal: Achieve decreasing blood glucose levels by end of shift  12/7/2023 0420 by Joshua Kimbrough RN  Outcome: Progressing  12/7/2023 0419 by Joshua Kimbrough RN  Outcome: Progressing  Goal: Increase stability of blood glucose readings by end of shift  12/7/2023 0420 by Joshua Kimbrough RN  Outcome: Progressing  12/7/2023 0419 by Joshua Kimbrough RN  Outcome: Progressing  Goal: Decrease in ketones present in urine by end of shift  12/7/2023 0420 by Joshua Kimbrough RN  Outcome: Progressing  12/7/2023 0419 by Joshua Kimbrough RN  Outcome: Progressing  Goal: Maintain electrolyte levels within acceptable range throughout shift  12/7/2023 0420 by Joshua Kimbrough RN  Outcome: Progressing  12/7/2023 0419 by Joshua Kimbrough RN  Outcome: Progressing  Goal: Maintain glucose levels >70mg/dl to <250mg/dl throughout shift  12/7/2023 0420 by Joshua Kimbrough RN  Outcome: Progressing  12/7/2023 0419 by Joshua Kimbrough RN  Outcome: Progressing  Goal: No changes in neurological exam by end of shift  12/7/2023 0420 by Joshua Kimbrough RN  Outcome: Progressing  12/7/2023 0419 by Joshua Kimbrough RN  Outcome: Progressing  Goal: Learn about and adhere to nutrition recommendations by end of shift  12/7/2023 0420 by Joshua Kimbrough RN  Outcome: Progressing  12/7/2023 0419 by Joshua Kimbrough RN  Outcome: Progressing  Goal: Vital signs within normal range for age by end of shift  12/7/2023 0420 by Joshua Kimbrough RN  Outcome: Progressing  12/7/2023 0419 by Joshua Kimbrough RN  Outcome: Progressing  Goal: Increase self care and/or family involovement by end of shift  12/7/2023 0420 by Joshua Kimbrough RN  Outcome: Progressing  12/7/2023 0419 by Joshua Kimbrough RN  Outcome: Progressing  Goal: Receive DSME education by end of shift  12/7/2023 0420 by Joshua Kimbrough RN  Outcome: Progressing  12/7/2023 0419 by Joshua Kimbrough RN  Outcome: Progressing     Problem: Pain - Adult  Goal: Verbalizes/displays adequate comfort level or baseline comfort  level  12/7/2023 0420 by Joshua Kimbrough RN  Outcome: Progressing  12/7/2023 0419 by Joshua Kimbrough RN  Outcome: Progressing     Problem: Safety - Adult  Goal: Free from fall injury  12/7/2023 0420 by Joshua Kimbrough RN  Outcome: Progressing  12/7/2023 0419 by Joshua Kimbrough RN  Outcome: Progressing     Problem: Discharge Planning  Goal: Discharge to home or other facility with appropriate resources  12/7/2023 0420 by Joshua Kimbrough RN  Outcome: Progressing  12/7/2023 0419 by Joshua Kimbrough RN  Outcome: Progressing     Problem: Chronic Conditions and Co-morbidities  Goal: Patient's chronic conditions and co-morbidity symptoms are monitored and maintained or improved  12/7/2023 0420 by Joshua Kimbrough RN  Outcome: Progressing  12/7/2023 0419 by Joshua Kimbrough RN  Outcome: Progressing     Problem: Skin  Goal: Decreased wound size/increased tissue granulation at next dressing change  Outcome: Progressing  Flowsheets (Taken 12/7/2023 0420)  Decreased wound size/increased tissue granulation at next dressing change:   Promote sleep for wound healing   Protective dressings over bony prominences   Utilize specialty bed per algorithm  Goal: Participates in plan/prevention/treatment measures  Outcome: Progressing  Flowsheets (Taken 12/7/2023 0420)  Participates in plan/prevention/treatment measures:   Discuss with provider PT/OT consult   Elevate heels  Goal: Prevent/manage excess moisture  Outcome: Progressing  Flowsheets (Taken 12/7/2023 0420)  Prevent/manage excess moisture:   Monitor for/manage infection if present   Cleanse incontinence/protect with barrier cream   Follow provider orders for dressing changes  Goal: Prevent/minimize sheer/friction injuries  Outcome: Progressing  Flowsheets (Taken 12/7/2023 0420)  Prevent/minimize sheer/friction injuries:   Use pull sheet   Increase activity/out of bed for meals   HOB 30 degrees or less   Turn/reposition every 2 hours/use positioning/transfer devices   Utilize specialty bed  per algorithm  Goal: Promote/optimize nutrition  Outcome: Progressing  Flowsheets (Taken 12/7/2023 0420)  Promote/optimize nutrition:   Assist with feeding   Monitor/record intake including meals   Consume > 50% meals/supplements   Offer water/supplements/favorite foods  Goal: Promote skin healing  Outcome: Progressing  Flowsheets (Taken 12/7/2023 0420)  Promote skin healing:   Assess skin/pad under line(s)/device(s)   Turn/reposition every 2 hours/use positioning/transfer devices   Protective dressings over bony prominences   The patient's goals for the shift include      The clinical goals for the shift include Skin care    Over the shift, the patient did not make progress toward the following goals. Barriers to progression include frequent incontinence. Recommendations to address these barriers include moisture barrier and nystatin.

## 2023-12-07 NOTE — PROGRESS NOTES
Ayanna Gross is a 73 y.o. female on day 1 of admission presenting with Hypotension, unspecified hypotension type.      TCC met with patient at bedside. Introduced self and explained role. Patient lives home with . Patient has shower chair and grab bars in the bathroom. Patient is wheelchair bound, and states she can transfer into wheelchair on her own. No reports of smoking or alcohol use.  PCP is Dr Conley . [facility] of choice for medications. Patient has LW and POA. Patient states she is active with [facility] was recently at [facility] where she was discharged home with [facility]. Plan is to return home with home care at this time. States she doesn't know if she would be willing to return to rehab, will wait to see therapy's recommendations. TCC to follow    Irene Patterson RN

## 2023-12-07 NOTE — PROGRESS NOTES
"Ayanna Gross is a 73 y.o. female on day 1 of admission presenting with Hypotension, unspecified hypotension type.    Subjective   Patient resting in bed. States she feels okay today. Denies chest pain, shortness of breath, lightheadedness, fevers, chills. Has LE tenderness.        Objective     Physical Exam  Constitutional:       Appearance: Normal appearance.   HENT:      Head: Normocephalic and atraumatic.      Mouth/Throat:      Mouth: Mucous membranes are moist.      Pharynx: Oropharynx is clear.   Eyes:      Extraocular Movements: Extraocular movements intact.      Conjunctiva/sclera: Conjunctivae normal.      Pupils: Pupils are equal, round, and reactive to light.   Cardiovascular:      Rate and Rhythm: Normal rate and regular rhythm.      Heart sounds: Normal heart sounds.   Pulmonary:      Effort: Pulmonary effort is normal.      Breath sounds: Normal breath sounds.   Abdominal:      General: Bowel sounds are normal.      Palpations: Abdomen is soft.      Tenderness: There is no abdominal tenderness.   Musculoskeletal:      Cervical back: Normal range of motion and neck supple.   Skin:     General: Skin is warm and dry.      Findings: Erythema (groin), rash (groin) and wound (bilateral heels) present.   Neurological:      Mental Status: She is alert.         Last Recorded Vitals  Blood pressure 139/82, pulse 81, temperature 36.6 °C (97.9 °F), temperature source Oral, resp. rate 22, height 1.575 m (5' 2\"), weight 72.6 kg (160 lb), SpO2 95 %.  Intake/Output last 3 Shifts:  I/O last 3 completed shifts:  In: 75 (1 mL/kg) [P.O.:75]  Out: - (0 mL/kg)   Weight: 72.6 kg     Relevant Results  Results for orders placed or performed during the hospital encounter of 12/06/23 (from the past 24 hour(s))   Basic Metabolic Panel   Result Value Ref Range    Glucose 99 65 - 99 mg/dL    Sodium 136 133 - 145 mmol/L    Potassium 3.8 3.4 - 5.1 mmol/L    Chloride 95 (L) 97 - 107 mmol/L    Bicarbonate 32 (H) 24 - 31 mmol/L    " Urea Nitrogen <3 (L) 8 - 25 mg/dL    Creatinine 1.20 0.40 - 1.60 mg/dL    eGFR 48 (L) >60 mL/min/1.73m*2    Calcium 8.6 8.5 - 10.4 mg/dL    Anion Gap 9 <=19 mmol/L   Blood Gas Lactic Acid, Venous   Result Value Ref Range    POCT Lactate, Venous 3.0 (H) 0.4 - 2.0 mmol/L   CBC and Auto Differential   Result Value Ref Range    WBC 11.1 4.4 - 11.3 x10*3/uL    nRBC 0.0 0.0 - 0.0 /100 WBCs    RBC 4.13 4.00 - 5.20 x10*6/uL    Hemoglobin 11.8 (L) 12.0 - 16.0 g/dL    Hematocrit 36.3 36.0 - 46.0 %    MCV 88 80 - 100 fL    MCH 28.6 26.0 - 34.0 pg    MCHC 32.5 32.0 - 36.0 g/dL    RDW 20.6 (H) 11.5 - 14.5 %    Platelets 183 150 - 450 x10*3/uL    Neutrophils % 68.2 40.0 - 80.0 %    Immature Granulocytes %, Automated 0.5 0.0 - 0.9 %    Lymphocytes % 18.3 13.0 - 44.0 %    Monocytes % 5.8 2.0 - 10.0 %    Eosinophils % 6.4 0.0 - 6.0 %    Basophils % 0.8 0.0 - 2.0 %    Neutrophils Absolute 7.57 (H) 1.60 - 5.50 x10*3/uL    Immature Granulocytes Absolute, Automated 0.06 0.00 - 0.50 x10*3/uL    Lymphocytes Absolute 2.03 0.80 - 3.00 x10*3/uL    Monocytes Absolute 0.64 0.05 - 0.80 x10*3/uL    Eosinophils Absolute 0.71 (H) 0.00 - 0.40 x10*3/uL    Basophils Absolute 0.09 0.00 - 0.10 x10*3/uL   Hepatic Function Panel   Result Value Ref Range    AST 42 (H) 5 - 40 U/L    ALT 24 5 - 40 U/L    Alkaline Phosphatase 88 35 - 125 U/L    Bilirubin, Total 1.0 0.1 - 1.2 mg/dL    Bilirubin, Direct 0.5 (H) 0.0 - 0.2 mg/dL    Total Protein 6.3 5.9 - 7.9 g/dL    Albumin 2.6 (L) 3.5 - 5.0 g/dL   Troponin T   Result Value Ref Range    Troponin T, High Sensitivity 213 (H) <=15 ng/L   Morphology   Result Value Ref Range    RBC Morphology See Below     Spherocytes Few     Target Cells Few     Bhavesh Cells Few     Acanthocytes Few     Clumped Platelets Present    Blood Gas Lactic Acid, Venous   Result Value Ref Range    POCT Lactate, Venous 2.3 (H) 0.4 - 2.0 mmol/L   Troponin T   Result Value Ref Range    Troponin T, High Sensitivity 205 (H) <=15 ng/L   CBC    Result Value Ref Range    WBC 11.7 (H) 4.4 - 11.3 x10*3/uL    nRBC 0.0 0.0 - 0.0 /100 WBCs    RBC 3.97 (L) 4.00 - 5.20 x10*6/uL    Hemoglobin 11.4 (L) 12.0 - 16.0 g/dL    Hematocrit 32.1 (L) 36.0 - 46.0 %    MCV 81 80 - 100 fL    MCH 28.7 26.0 - 34.0 pg    MCHC 35.5 32.0 - 36.0 g/dL    RDW 20.2 (H) 11.5 - 14.5 %    Platelets 172 150 - 450 x10*3/uL   Comprehensive metabolic panel   Result Value Ref Range    Glucose 76 65 - 99 mg/dL    Sodium 136 133 - 145 mmol/L    Potassium 4.1 3.4 - 5.1 mmol/L    Chloride 98 97 - 107 mmol/L    Bicarbonate 25 24 - 31 mmol/L    Urea Nitrogen 6 (L) 8 - 25 mg/dL    Creatinine 1.90 (H) 0.40 - 1.60 mg/dL    eGFR 28 (L) >60 mL/min/1.73m*2    Calcium 8.6 8.5 - 10.4 mg/dL    Albumin 2.2 (L) 3.5 - 5.0 g/dL    Alkaline Phosphatase 78 35 - 125 U/L    Total Protein 5.7 (L) 5.9 - 7.9 g/dL    AST 40 5 - 40 U/L    Bilirubin, Total 1.2 0.1 - 1.2 mg/dL    ALT 21 5 - 40 U/L    Anion Gap 13 <=19 mmol/L   POCT GLUCOSE   Result Value Ref Range    POCT Glucose 72 (L) 74 - 99 mg/dL       Assessment/Plan   Principal Problem:    Hypotension, unspecified hypotension type              Vital signs per order               Continue midodrine               Continue low-dose Toprol in am               Hold Imdur               Blood cultures and antibiotics ordered in ER    Left arm fistula and history of right sided mastectomy. BP in lower extremities WNLs, but left arm BP low.               Fall precautions   Active Problems:    Paroxysmal atrial fibrillation (CMS/HCC)              Continue low-dose beta blocker               Hold Eliquis for possible thoracentesis     Peripheral vascular disease (CMS/HCC)              Stable     Orthostatic hypotension              Continue midodrine               Check orthostatic vital signs     Mixed hyperlipidemia              Continue statin, fenofibrate     End-stage renal disease on hemodialysis (CMS/HCC)              [facility]                Check AM labs     DM (diabetes mellitus) (CMS/[facility])              Hold Tradjenta               AC/HS glucose with SSI coverage               Hypoglycemia protocol     Bilateral heel wounds    Consult podiatry    Wound care consult    Bilateral boots to protect heels from pressure     Groin excoriation    Wound care consult    Nystatin powder    Keep skin clean and dry    Sacrum covered with Mepilex.       Susan Rashid, APRN-CNP

## 2023-12-08 NOTE — PROGRESS NOTES
Physical Therapy                 Therapy Communication Note    Patient Name: Ayanna Gross  MRN: [id number]  Today's Date: 12/8/2023     Discipline: Physical Therapy    Missed Visit Reason: Missed Visit Reason:  (podiatry in with patient for dressing change and then scheduled for vascular studies and dialysis.)    Missed Time: Cancel

## 2023-12-08 NOTE — CARE PLAN
Problem: Skin  Goal: Prevent/minimize sheer/friction injuries  12/8/2023 0618 by Krys Garcia RN  Outcome: Progressing  Flowsheets (Taken 12/8/2023 0618)  Prevent/minimize sheer/friction injuries:   Complete micro-shifts as needed if patient unable. Adjust patient position to relieve pressure points, not a full turn   HOB 30 degrees or less   Turn/reposition every 2 hours/use positioning/transfer devices   Use pull sheet   Utilize specialty bed per algorithm  12/8/2023 0616 by Krys Garcia RN  Outcome: Not Progressing   The patient's goals for the shift include      The clinical goals for the shift include repositioning      Problem: Safety - Adult  Goal: Free from fall injury  12/8/2023 0618 by Krys Garcia RN  Outcome: Progressing  12/8/2023 0616 by Krys Garcia RN  Outcome: Not Progressing   Problem: Diabetes  Goal: Achieve decreasing blood glucose levels by end of shift  12/8/2023 0618 by Krys Garcia RN  Outcome: Progressing  12/8/2023 0616 by Krys Garcia RN  Outcome: Not Progressing  Goal: Increase stability of blood glucose readings by end of shift  12/8/2023 0618 by Krys Garcia RN  Outcome: Progressing  12/8/2023 0616 by Krys Garcia RN  Outcome: Not Progressing  Goal: Decrease in ketones present in urine by end of shift  12/8/2023 0618 by Krys Garcia RN  Outcome: Progressing  12/8/2023 0616 by Krys Garcia RN  Outcome: Not Progressing  Goal: Maintain electrolyte levels within acceptable range throughout shift  12/8/2023 0618 by Krys Garcia RN  Outcome: Progressing  12/8/2023 0616 by Krys Garcia RN  Outcome: Not Progressing  Goal: Maintain glucose levels >70mg/dl to <250mg/dl throughout shift  12/8/2023 0618 by Krys Garcia RN  Outcome: Progressing  12/8/2023 0616 by Krys Garcia RN  Outcome: Not Progressing  Goal: No changes in neurological exam by end of shift  12/8/2023 0618 by Krys Garcia RN  Outcome: Progressing  12/8/2023 0616 by  Krys Garcia RN  Outcome: Not Progressing  Goal: Learn about and adhere to nutrition recommendations by end of shift  12/8/2023 0618 by Krys Garcia RN  Outcome: Progressing  12/8/2023 0616 by Krys Garcia RN  Outcome: Not Progressing  Goal: Vital signs within normal range for age by end of shift  12/8/2023 0618 by Krys Garcia RN  Outcome: Progressing  12/8/2023 0616 by Krys Garcia RN  Outcome: Not Progressing  Goal: Increase self care and/or family involovement by end of shift  12/8/2023 0618 by Krys Garcia RN  Outcome: Progressing  12/8/2023 0616 by Krys Garcia RN  Outcome: Not Progressing  Goal: Receive DSME education by end of shift  12/8/2023 0618 by Krys Garcia RN  Outcome: Progressing  12/8/2023 0616 by Krys Garcia RN  Outcome: Not Progressing     Problem: Pain - Adult  Goal: Verbalizes/displays adequate comfort level or baseline comfort level  12/8/2023 0618 by Krys Garcia RN  Outcome: Progressing  12/8/2023 0616 by Krys Garcia RN  Outcome: Not Progressing     Problem: Safety - Adult  Goal: Free from fall injury  12/8/2023 0618 by Krys Garcia RN  Outcome: Progressing  12/8/2023 0616 by Krys Garcia RN  Outcome: Not Progressing     Problem: Discharge Planning  Goal: Discharge to home or other facility with appropriate resources  12/8/2023 0618 by Krys Garcia RN  Outcome: Progressing  12/8/2023 0616 by Krys Garcia RN  Outcome: Not Progressing     Problem: Chronic Conditions and Co-morbidities  Goal: Patient's chronic conditions and co-morbidity symptoms are monitored and maintained or improved  12/8/2023 0618 by Krys Garcia RN  Outcome: Progressing  12/8/2023 0616 by Krys Garcia RN  Outcome: Not Progressing     Problem: Skin  Goal: Decreased wound size/increased tissue granulation at next dressing change  12/8/2023 0618 by Krys Garcia RN  Outcome: Progressing  12/8/2023 0616 by Krys Garcia, RN  Outcome: Not  Progressing  Goal: Participates in plan/prevention/treatment measures  12/8/2023 0618 by Krys Garcia RN  Outcome: Progressing  12/8/2023 0616 by Krys Garcia RN  Outcome: Not Progressing  Flowsheets (Taken 12/7/2023 1702 by Sherron Evans, RN)  Participates in plan/prevention/treatment measures:   Discuss with provider PT/OT consult   Elevate heels  Goal: Prevent/manage excess moisture  12/8/2023 0618 by Krys Garcia RN  Outcome: Progressing  12/8/2023 0616 by Krys Garcia RN  Outcome: Not Progressing  Goal: Prevent/minimize sheer/friction injuries  12/8/2023 0618 by Krys Garcia RN  Outcome: Progressing  Flowsheets (Taken 12/8/2023 0618)  Prevent/minimize sheer/friction injuries:   Complete micro-shifts as needed if patient unable. Adjust patient position to relieve pressure points, not a full turn   HOB 30 degrees or less   Turn/reposition every 2 hours/use positioning/transfer devices   Use pull sheet   Utilize specialty bed per algorithm  12/8/2023 0616 by Krys Garcia RN  Outcome: Not Progressing  Goal: Promote/optimize nutrition  12/8/2023 0618 by Krys Garcia RN  Outcome: Progressing  12/8/2023 0616 by Krys Garcia RN  Outcome: Not Progressing  Goal: Promote skin healing  12/8/2023 0618 by Krys Garcia RN  Outcome: Progressing  12/8/2023 0616 by Krys Garcia RN  Outcome: Not Progressing     Problem: Fall/Injury  Goal: Not fall by end of shift  12/8/2023 0618 by Krys Garcia RN  Outcome: Progressing  12/8/2023 0616 by Krys Garcia RN  Outcome: Not Progressing  Goal: Be free from injury by end of the shift  12/8/2023 0618 by Krys Garcia RN  Outcome: Progressing  12/8/2023 0616 by Krys Garcia RN  Outcome: Not Progressing  Goal: Verbalize understanding of personal risk factors for fall in the hospital  12/8/2023 0618 by Krys Garcia RN  Outcome: Progressing  12/8/2023 0616 by Krys L Garcia, RN  Outcome: Not Progressing  Goal: Verbalize  understanding of risk factor reduction measures to prevent injury from fall in the home  12/8/2023 0618 by Krys Garcia RN  Outcome: Progressing  12/8/2023 0616 by Krys Garcia RN  Outcome: Not Progressing  Goal: Use assistive devices by end of the shift  12/8/2023 0618 by Krys Garcia RN  Outcome: Progressing  12/8/2023 0616 by Krys Garcia RN  Outcome: Not Progressing  Goal: Pace activities to prevent fatigue by end of the shift  12/8/2023 0618 by Krys Garcia RN  Outcome: Progressing  12/8/2023 0616 by Krys Garcia RN  Outcome: Not Progressing     Problem: Nutrition  Goal: Less than 5 days NPO/clear liquids  12/8/2023 0618 by Krys Garcia RN  Outcome: Progressing  12/8/2023 0616 by Krys Garcia RN  Outcome: Not Progressing  Goal: Oral intake greater than 50%  12/8/2023 0618 by Krys Garcia RN  Outcome: Progressing  12/8/2023 0616 by Krys Garcia RN  Outcome: Not Progressing  Goal: Oral intake greater 75%  12/8/2023 0618 by Krys Garcia RN  Outcome: Progressing  12/8/2023 0616 by Krys Garcia RN  Outcome: Not Progressing  Goal: Consume prescribed supplement  12/8/2023 0618 by Krys Garcia RN  Outcome: Progressing  12/8/2023 0616 by Krys Garcia RN  Outcome: Not Progressing  Goal: Adequate PO fluid intake  12/8/2023 0618 by Krys Garcia RN  Outcome: Progressing  12/8/2023 0616 by Krys Garcia RN  Outcome: Not Progressing  Goal: Nutrition support goals are met within 48 hrs  12/8/2023 0618 by Krys Garcia RN  Outcome: Progressing  12/8/2023 0616 by Krys Garcia RN  Outcome: Not Progressing  Goal: Nutrition support is meeting 75% of nutrient needs  12/8/2023 0618 by Krys Garcia RN  Outcome: Progressing  12/8/2023 0616 by Krys L Garcia, RN  Outcome: Not Progressing  Goal: Tube feed tolerance  12/8/2023 0618 by Krys Garcia, RN  Outcome: Progressing  12/8/2023 0616 by Krys Garcia, RN  Outcome: Not Progressing  Goal: BG   mg/dL  12/8/2023 0618 by Krys Garcia RN  Outcome: Progressing  12/8/2023 0616 by Krys Garcia RN  Outcome: Not Progressing  Goal: Lab values WNL  12/8/2023 0618 by Krys Garcia, RN  Outcome: Progressing  12/8/2023 0616 by Krys Garcia RN  Outcome: Not Progressing  Goal: Electrolytes WNL  12/8/2023 0618 by Krys Garcia, RN  Outcome: Progressing  12/8/2023 0616 by Krys Garcia RN  Outcome: Not Progressing  Goal: Promote healing  12/8/2023 0618 by Krys Garcia, RN  Outcome: Progressing  12/8/2023 0616 by Krys Garcia, RN  Outcome: Not Progressing  Goal: Maintain stable weight  12/8/2023 0618 by Krys Garcia, RN  Outcome: Progressing  12/8/2023 0616 by Krys Garcia, RN  Outcome: Not Progressing  Goal: Reduce weight from edema/fluid  12/8/2023 0618 by Krys Garica, RN  Outcome: Progressing  12/8/2023 0616 by Krys Garcia, RN  Outcome: Not Progressing  Goal: Gradual weight gain  12/8/2023 0618 by Krys Garcia, RN  Outcome: Progressing  12/8/2023 0616 by Krys Garcia RN  Outcome: Not Progressing  Goal: Improve ostomy output  12/8/2023 0618 by Krys Garcia, RN  Outcome: Progressing  12/8/2023 0616 by Krys Garcia, RN  Outcome: Not Progressing

## 2023-12-08 NOTE — PROGRESS NOTES
The patient reportedly had a blood pressure of 53/39, obtained from the left leg. 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 The heart rate was 78. She was awake and alert and sitting up in bed.   IV albumin 25 grams was ordered per the recommendation of nephrology.   A repeat blood pressure was 71/59 and the heart rate was 72. IV albumin is to be given.

## 2023-12-08 NOTE — TELEPHONE ENCOUNTER
Called and left message for patient at listed number of [phone] as well as [phone] regarding appointment scheduled for 12/12/23 with Dr. Nesha Murillo. Patient will need to reschedule her appointment as the ordered Echo has not yet been completed. Will be happy to assist scheduling that if need be upon returned call.

## 2023-12-08 NOTE — CARE PLAN
The patient's goals for the shift include  turns, improved skin, take meds, see drs.    The clinical goals for the shift include repositioning, improved skin, pain manage.    Over the shift, the patient did not make progress toward the following goals. Barriers to progression include disease process. Recommendations to address these barriers include clean up with turns, and encourage participation.

## 2023-12-08 NOTE — CONSULTS
Inpatient consult to Palliative Care  Consult performed by: LILLIE Rangel  Consult ordered by: LILLIE Gaona  Reason for consult: Frequent hospitalization           History Of Present Illness  Ayanna Gross is a 73 y.o. female with past medical history of end-stage renal disease chronic hypotension heart failure diabetes as well as a multitude of other chronic conditions.  She herself does not know why she came to the hospital in speaking with her neurologist Dr. Saeed Mondragon she suffers from chronic hypertension on dialysis and ultimately sent in because of questionable mental status change and hypotension at the completion of dialysis.  During his hospitalization patient was found to be tested positive for C. difficile infectious disease has been consulted she is also being treated for chronic infective endocarditis.  By podiatry for bilateral foot wounds x-rays did not show osteomyelitis.  To have pleural effusion will end up getting tapped reportedly for 750 mL  She endorses basically no significant complaints at the time I saw her.  She is not a great historian falling asleep frequently during my examination and question.  No family or friends present she tells me her  is not yet home from work.    Lab work reviewed showing a white count of 11.3 hemoglobin 10.7 hematocrit 33.5 platelets 150,000 sodium 136 potassium 3.9 chloride 91 CO2 28 BUN 12 creatinine 3.3 9 calcium 8.7 glucose 106  X-rays of her bilateral feet showed heel spurs no osteomyelitis and atherosclerotic disease.     Symptoms (0 - 10, Best to Worst)  [place] Symptom Assessment System  Pain Score: 0 - No pain    BM in last 48 hours? yes  PCR for C. difficile positive toxin negative  Emotional/Psychological/Spiritual Needs  Over the past two weeks, how often has the patient been bothered by having little interest or pleasure in doing things?  occurrence (last two weeks): several days    Over the past two weeks,  how often has the patient been bothered by feeling down, depressed, or hopeless?  occurrence (last two weeks): several days   Personal/Social History   She reports that she has never smoked. She has never used smokeless tobacco. She reports that she does not currently use alcohol. She reports that she does not use drugs.    Functional Status    Declining functional status    Caregiving/Caregiver Support  Does the patient require assistance in some or all components of his care, including coordination of medical care? Yes  If Yes, which person serves that role?  spouse   Caregiver emotional or practical needs:      Past Medical History  She has a past medical history of Asthma, CAD (coronary artery disease), CHF (congestive heart failure) (CMS/[facility]), Chronic kidney disease on chronic dialysis (CMS/[facility]), Hypertension, Personal history of diseases of the blood and blood-forming organs and certain disorders involving the immune mechanism, Sleep apnea, and Type 2 diabetes mellitus without complications (CMS/[facility]) (09/15/2022).    Surgical History  She has a past surgical history that includes Other surgical history (11/18/2013); Carpal tunnel release (11/18/2013); Umbilical hernia repair (11/18/2013); Tonsillectomy (11/18/2013); Hand surgery (11/18/2013); Other surgical history (11/18/2013); Other surgical history (11/18/2013); Other surgical history (04/04/2022); Other surgical history (04/04/2022); Other surgical history (04/04/2022); Mastectomy, partial (04/10/2014); [place] lymph node biopsy (04/10/2014); US guided percutaneous placement (6/29/2023); and MR angio head wo IV contrast (8/29/2023).     Family History  Family History   Problem Relation Name Age of Onset    Lymphoma Mother      Prostate cancer Father          passed away fabiana min 2017     Allergies  Patient has no known allergies.    Review of Systems  As per the hospitals Limited review of systems given patient's lethargy she cannot tell me  Why she came  to the hospital.  Some weakness tiredness and fatigue denied fever chills headache dizziness lightheadedness no chest pain palpitations or tachycardia overall she feels decent but run down.  She pretty much denies any other complaints.     Physical Exam  Vitals and nursing note reviewed.   Constitutional:       General: She is not in acute distress.     Appearance: She is ill-appearing (Chronically ill-appearing). She is not toxic-appearing or diaphoretic.   HENT:      Head: Normocephalic and atraumatic.      Nose: No congestion or rhinorrhea.      Mouth/Throat:      Mouth: Mucous membranes are moist.      Pharynx: Oropharynx is clear. No oropharyngeal exudate or posterior oropharyngeal erythema.   Eyes:      General: No scleral icterus.        Right eye: No discharge.         Left eye: No discharge.      Extraocular Movements: Extraocular movements intact.      Conjunctiva/sclera: Conjunctivae normal.      Pupils: Pupils are equal, round, and reactive to light.   Neck:      Vascular: No carotid bruit.   Cardiovascular:      Rate and Rhythm: Normal rate and regular rhythm.      Heart sounds: Normal heart sounds. No murmur heard.     No friction rub. No gallop.      Comments: Occasional premature beats    Weak peripheral pulses  Difficult to palpate in the lower extremities due to bilateral dressings placed by podiatry  Pulmonary:      Effort: No respiratory distress.      Breath sounds: No stridor. No wheezing, rhonchi or rales.      Comments: Normal respiratory rhythm and rate normal effort diminished breath sounds bilaterally but generally clear  Chest:      Chest wall: No tenderness.      Comments: Right upper chest hemodialysis catheter  Abdominal:      General: There is no distension.      Tenderness: There is no abdominal tenderness. There is no guarding or rebound.      Comments: Soft abdomen nontender nondistended positive bowel sounds without any rebound or guarding unable to evaluate for CVAT as she is  "getting finished up from dialysis   Genitourinary:     Comments: Deferred  Musculoskeletal:      Cervical back: Normal range of motion and neck supple. No rigidity or tenderness.   Lymphadenopathy:      Cervical: No cervical adenopathy.   Skin:     Coloration: Skin is pale. Skin is not jaundiced.      Findings: Bruising and rash (Per discussion with nursing she has intertrigo) present.   Neurological:      Cranial Nerves: No cranial nerve deficit.      Motor: Weakness present.      Comments: She is disoriented and confused easily falls asleep during my examination with a lot of prompting she stays awake to answer my questions do not feel that she is a capable decision maker able to them adequately weigh risks and benefits of treatment versus nontreatment   she can move her extremities generally she is weak but not focal  Gait is not observed    No obvious any facial droop no dysarthria noted shoulder shrugs are equal and symmetric no abnormal sensation to her face   Psychiatric:      Comments: Her mood seems to be appropriate she is neither anxious nor she agitated  Responses are appropriate though she does not seem to have a full understanding of what got her in the hospital         Last Recorded Vitals  Blood pressure (!) 157/109, pulse 71, temperature 36.6 °C (97.9 °F), temperature source Temporal, resp. rate 16, height 1.575 m (5' 2\"), weight 72.6 kg (160 lb), SpO2 95 %.    Relevant Results  No results found for the last 90 days.    XR chest 1 view    Result Date: 12/8/2023  Interpreted By:  Maria Garcia, STUDY: XR CHEST 1 VIEW 12/8/2023 11:57 am   INDICATION: Signs/Symptoms:S/P thoracentesis   COMPARISON: 12/06/2023   ACCESSION NUMBER(S): [id number]   ORDERING CLINICIAN: JEZ LONG   TECHNIQUE: AP erect view of the chest at bedside   FINDINGS: There is an interval decrease in size of left pleural effusion when compared with the study done 2 days earlier. No pneumothorax is seen.   There is some residual " left pleural effusion with infiltrates seen in the left mid and lower lung field. The right lung is clear.   There are surgical clips within the left axilla. Stent grafts are visible in the left subclavian region extending into the left axillary region. A tunneled right jugular dialysis catheter terminates within right atrium.       Decreased size of left effusion following ultrasound-guided thoracentesis with no pneumothorax.   There is some residual left pleural effusion noted with infiltrate in the left mid and lower lung field.   Signed by: Maria Garcia 12/8/2023 12:51 PM Dictation workstation:   [id number]    XR foot 3+ views bilateral    Result Date: 12/7/2023  Interpreted By:  Brendon Hook, STUDY: XR FOOT 3+ VIEWS BILATERAL; 12/7/2023 3:31 pm   INDICATION: Signs/Symptoms:Wound/Abscess/Acute neha process rule out/Comparative Exam.   COMPARISON: None   ACCESSION NUMBER(S): [id number]   ORDERING CLINICIAN: JOSSELYN STAHL   TECHNIQUE: Nonweightbearing AP, lateral and oblique views of the bilateral foot were performed.   FINDINGS: Right foot: Large heel spur is seen. Mild Achilles enthesopathy demonstrated. Moderate atherosclerotic disease is seen. There is suggestion of loss of the arch on this nonweightbearing view. Degenerative change anterior plafond demonstrated and dorsal talus as well as posterior plafond and dorsal talus. Sclerosis anterior calcaneus is seen. Degenerative changes mild between the midfoot and forefoot. 1st metatarsophalangeal joint degenerative changes demonstrated. 2nd ray absent phalanges. Distal phalanges of the 3rd through 5th rays with overlap were difficult to visualize. Distal phalanx of the great toe is difficult to visualize.   Left foot: Large heel spur is seen. Advanced atherosclerotic disease demonstrated. Degenerative change in the calcaneus and talus seen with moderate anterior and mild posterior plafond degenerative change. Mild degenerative change in the midfoot and  forefoot is seen. Distal phalanx of the great toe is absent. Degenerative change of the 2nd ray phalanges and 3rd ray phalanges seen with degenerative change of the 4th and 5th digits not well seen.       1. Right foot: Large heel spur. Atherosclerotic disease. Suggestion of diffuse osteopenia. No sclerosis to suggest osteomyelitis. No subcutaneous air to suggest gangrene. Absent flanges 2nd ray with difficult to visualized distal phalanges of the other digits.   2. Left foot: Large heel spur. Degenerative changes of the hindfoot midfoot and forefoot with atherosclerotic disease. Absent distal phalanx of the great toe. No periostitis or abnormal sclerosis with diffuse osteopenia to suggest osteomyelitis. No subcutaneous air to suggest gangrene.       Signed by: Brendon Hook 12/7/2023 8:39 PM Dictation workstation:   [id number]   Results for orders placed or performed during the hospital encounter of 12/06/23 (from the past 96 hour(s))   ECG 12 Lead   Result Value Ref Range    Ventricular Rate 100 BPM    Atrial Rate 85 BPM    TN Interval 206 ms    QRS Duration 138 ms    QT Interval 488 ms    QTC Calculation(Bazett) 629 ms    P Axis 72 degrees    R Axis 137 degrees    T Axis 107 degrees    QRS Count 16 beats    Q Onset 229 ms    P Onset 126 ms    P Offset 170 ms    T Offset 473 ms    QTC Fredericia 579 ms   Basic Metabolic Panel   Result Value Ref Range    Glucose 99 65 - 99 mg/dL    Sodium 136 133 - 145 mmol/L    Potassium 3.8 3.4 - 5.1 mmol/L    Chloride 95 (L) 97 - 107 mmol/L    Bicarbonate 32 (H) 24 - 31 mmol/L    Urea Nitrogen <3 (L) 8 - 25 mg/dL    Creatinine 1.20 0.40 - 1.60 mg/dL    eGFR 48 (L) >60 mL/min/1.73m*2    Calcium 8.6 8.5 - 10.4 mg/dL    Anion Gap 9 <=19 mmol/L   Blood Gas Lactic Acid, Venous   Result Value Ref Range    POCT Lactate, Venous 3.0 (H) 0.4 - 2.0 mmol/L   CBC and Auto Differential   Result Value Ref Range    WBC 11.1 4.4 - 11.3 x10*3/uL    nRBC 0.0 0.0 - 0.0 /100 WBCs    RBC 4.13 4.00 -  5.20 x10*6/uL    Hemoglobin 11.8 (L) 12.0 - 16.0 g/dL    Hematocrit 36.3 36.0 - 46.0 %    MCV 88 80 - 100 fL    MCH 28.6 26.0 - 34.0 pg    MCHC 32.5 32.0 - 36.0 g/dL    RDW 20.6 (H) 11.5 - 14.5 %    Platelets 183 150 - 450 x10*3/uL    Neutrophils % 68.2 40.0 - 80.0 %    Immature Granulocytes %, Automated 0.5 0.0 - 0.9 %    Lymphocytes % 18.3 13.0 - 44.0 %    Monocytes % 5.8 2.0 - 10.0 %    Eosinophils % 6.4 0.0 - 6.0 %    Basophils % 0.8 0.0 - 2.0 %    Neutrophils Absolute 7.57 (H) 1.60 - 5.50 x10*3/uL    Immature Granulocytes Absolute, Automated 0.06 0.00 - 0.50 x10*3/uL    Lymphocytes Absolute 2.03 0.80 - 3.00 x10*3/uL    Monocytes Absolute 0.64 0.05 - 0.80 x10*3/uL    Eosinophils Absolute 0.71 (H) 0.00 - 0.40 x10*3/uL    Basophils Absolute 0.09 0.00 - 0.10 x10*3/uL   Hepatic Function Panel   Result Value Ref Range    AST 42 (H) 5 - 40 U/L    ALT 24 5 - 40 U/L    Alkaline Phosphatase 88 35 - 125 U/L    Bilirubin, Total 1.0 0.1 - 1.2 mg/dL    Bilirubin, Direct 0.5 (H) 0.0 - 0.2 mg/dL    Total Protein 6.3 5.9 - 7.9 g/dL    Albumin 2.6 (L) 3.5 - 5.0 g/dL   Troponin T   Result Value Ref Range    Troponin T, High Sensitivity 213 (H) <=15 ng/L   Morphology   Result Value Ref Range    RBC Morphology See Below     Spherocytes Few     Target Cells Few     Bhavesh Cells Few     Acanthocytes Few     Clumped Platelets Present    Blood Gas Lactic Acid, Venous   Result Value Ref Range    POCT Lactate, Venous 2.3 (H) 0.4 - 2.0 mmol/L   Troponin T   Result Value Ref Range    Troponin T, High Sensitivity 205 (H) <=15 ng/L   Blood Culture    Specimen: Peripheral Venipuncture; Blood culture   Result Value Ref Range    Blood Culture No growth at 1 day    Blood Culture    Specimen: Peripheral Venipuncture; Blood culture   Result Value Ref Range    Blood Culture No growth at 1 day    CBC   Result Value Ref Range    WBC 11.7 (H) 4.4 - 11.3 x10*3/uL    nRBC 0.0 0.0 - 0.0 /100 WBCs    RBC 3.97 (L) 4.00 - 5.20 x10*6/uL    Hemoglobin 11.4 (L)  12.0 - 16.0 g/dL    Hematocrit 32.1 (L) 36.0 - 46.0 %    MCV 81 80 - 100 fL    MCH 28.7 26.0 - 34.0 pg    MCHC 35.5 32.0 - 36.0 g/dL    RDW 20.2 (H) 11.5 - 14.5 %    Platelets 172 150 - 450 x10*3/uL   Comprehensive metabolic panel   Result Value Ref Range    Glucose 76 65 - 99 mg/dL    Sodium 136 133 - 145 mmol/L    Potassium 4.1 3.4 - 5.1 mmol/L    Chloride 98 97 - 107 mmol/L    Bicarbonate 25 24 - 31 mmol/L    Urea Nitrogen 6 (L) 8 - 25 mg/dL    Creatinine 1.90 (H) 0.40 - 1.60 mg/dL    eGFR 28 (L) >60 mL/min/1.73m*2    Calcium 8.6 8.5 - 10.4 mg/dL    Albumin 2.2 (L) 3.5 - 5.0 g/dL    Alkaline Phosphatase 78 35 - 125 U/L    Total Protein 5.7 (L) 5.9 - 7.9 g/dL    AST 40 5 - 40 U/L    Bilirubin, Total 1.2 0.1 - 1.2 mg/dL    ALT 21 5 - 40 U/L    Anion Gap 13 <=19 mmol/L   POCT GLUCOSE   Result Value Ref Range    POCT Glucose 72 (L) 74 - 99 mg/dL   C. difficile, PCR    Specimen: Stool   Result Value Ref Range    C. difficile, PCR Detected (A) Not Detected   Clostridium Difficile EIA    Specimen: Stool   Result Value Ref Range    C. difficile (Toxin A/B) Negative Negative, Indeterminate   POCT GLUCOSE   Result Value Ref Range    POCT Glucose 90 74 - 99 mg/dL   Sterile Fluid Culture/Smear    Specimen: Pleural; Fluid   Result Value Ref Range    Gram Stain (3+) Moderate Polymorphonuclear leukocytes     Gram Stain No organisms seen    Body Fluid Cell Count   Result Value Ref Range    Color, Fluid Yellow Colorless, Straw, Yellow    Clarity, Fluid Clear Clear    WBC, Fluid 137 See Comment /uL    RBC, Fluid 198 0  /uL /uL   Body Fluid Differential   Result Value Ref Range    Neutrophils %, Manual, Fluid 19 <25 % %    Lymphocytes %, Manual, Fluid 69 <75 % %    Mono/Macrophages %, Manual, Fluid 12 <70 % %    Eosinophils %, Manual, Fluid 0 0 % %    Basophils %, Manual, Fluid 0 0 % %    Immature Granulocytes %, Manual, Fluid 0 0 % %    Blasts %, Manual, Fluid 0 0 % %    Unclassified Cells %, Manual, Fluid 0 0 % %    Plasma Cells  %, Manual, Fluid 0 0 % %    Total Cells Counted, Fluid 100    Lactate Dehydrogenase, Fluid   Result Value Ref Range    LD, Fluid 34 Not established. U/L   POCT GLUCOSE   Result Value Ref Range    POCT Glucose 119 (H) 74 - 99 mg/dL   POCT GLUCOSE   Result Value Ref Range    POCT Glucose 64 (L) 74 - 99 mg/dL   POCT GLUCOSE   Result Value Ref Range    POCT Glucose 135 (H) 74 - 99 mg/dL   POCT GLUCOSE   Result Value Ref Range    POCT Glucose 85 74 - 99 mg/dL   POCT GLUCOSE   Result Value Ref Range    POCT Glucose 84 74 - 99 mg/dL   Basic metabolic panel   Result Value Ref Range    Glucose 106 (H) 65 - 99 mg/dL    Sodium 136 133 - 145 mmol/L    Potassium 3.9 3.4 - 5.1 mmol/L    Chloride 99 97 - 107 mmol/L    Bicarbonate 28 24 - 31 mmol/L    Urea Nitrogen 12 8 - 25 mg/dL    Creatinine 3.30 (H) 0.40 - 1.60 mg/dL    eGFR 14 (L) >60 mL/min/1.73m*2    Calcium 8.7 8.5 - 10.4 mg/dL    Anion Gap 9 <=19 mmol/L   CBC   Result Value Ref Range    WBC 11.3 4.4 - 11.3 x10*3/uL    nRBC 0.0 0.0 - 0.0 /100 WBCs    RBC 3.78 (L) 4.00 - 5.20 x10*6/uL    Hemoglobin 10.7 (L) 12.0 - 16.0 g/dL    Hematocrit 33.5 (L) 36.0 - 46.0 %    MCV 89 80 - 100 fL    MCH 28.3 26.0 - 34.0 pg    MCHC 31.9 (L) 32.0 - 36.0 g/dL    RDW 21.1 (H) 11.5 - 14.5 %    Platelets 158 150 - 450 x10*3/uL     No echocardiogram results found for the past 12 months    Symptom Management  Pain: none  Medications recommended for pain?  No  Tiredness: yes  Nausea: no  Depression: denied   Anxiety: denied  Drowsiness: yes  Appetite: mediocre  Wellbeing: Fair  Dyspnea: denied  Intervention recommended for dyspnea?  no  Intervention recommended for constipation?  No    Patient/proxy preference for information  Prefers full information    Is the patient hospice-eligible?   Yes  Was a discussion held re hospice services?   no  Was a decision made re hospice services?  No       Assessment/Plan   IMP:    End-stage renal disease on hemodialysis  Hypotension, acute on  chronic  Clostridium C. difficile infection  Pleural effusion  History of MRSA bacteremia infective endocarditis  Bilateral foot wounds  Palliative care    Presently patient is a full code I do not feel she is a capable decision maker  She is not able to adequately weigh risks and benefits of treatment versus nontreatment has no idea what got her in the hospital.   currently not available will plan on following up conversation with him pertaining to goals of care.  Currently without any pain or anxiety no acute palliative symptoms to manage.    Overall it seems that she is somewhat better after getting paracentesis now tested positive for C. difficile will be treated by infectious disease she is on her chronic antibiotics for her history of MRSA bacteremia and infective endocarditis  Podiatry has seen her regarding the bilateral foot wounds x-rays reviewed does not seem that she has osteomyelitis needs to continue protecting the using offloading pressure is much as possible as per their direction.    She continues on hemodialysis noted plans from nephrology to use colloid instead of crystalloid if she has hypotension completely agree with this, her albumin is only 2.2.   Will check a prealbumin to try and get a handle on severity of her malnutrition status.    Overall per my conversation with nephrology her hypotension is typically chronic for which she is on midodrine, though it seems that she has a handful of other issues going on at this point to boot.    Will plan on having conversations over the weekend if I can get in touch with the patient's  to get some more information if the patient becomes more capable she can engage in these conversations but as it stands right now I do not think she should be relied upon to make any major decisions given her change in mental status.    Thank very much for the consultation palliative medicine to follow-up.      Joshua Godfrey, APRN-CNP

## 2023-12-08 NOTE — NURSING NOTE
" 1400 Went to start dialysis on this patient.  As starting pre-assessment VS.  Was unable to obtain a blood pressure several time.  When finally got a BP, It was 51/19,  when attempted to try again it was 58/33 after changing the cuff and placing it on the other leg. Next primary nurse placed pt on monitor and BP was 70/54.  At 1459 message sent to Dr. Mondragon.  Also primary nurse, Holly, notified Susan Rashid. 1520, Susan Rashid came to assess pt.  Susan EDWARDS also messaged Dr. Mondragon and answered her by saying to give the pt Midodrine and Albumin and see how the pressures are.  At 1614.  Dr. Mondragon message this nurse stating \"give albumin, give midodrine, no UF, proceed with tx if she is more alert(which is what I heard).  1627 Albumin and Midodrine given.  1706. Tx started per Dr. Colmenares's order.   "

## 2023-12-08 NOTE — PROGRESS NOTES
"Ayanna Gross is a 73 y.o. female on day 2 of admission presenting with Hypotension, unspecified hypotension type.    Subjective   Patient resting in bed. Complains of lower extremity pain. Denies chest pain, shortness of breath and abdominal pain.        Objective     Physical Exam  Constitutional:       Appearance: Normal appearance.   HENT:      Head: Normocephalic and atraumatic.      Mouth/Throat:      Mouth: Mucous membranes are moist.      Pharynx: Oropharynx is clear.   Eyes:      Extraocular Movements: Extraocular movements intact.      Conjunctiva/sclera: Conjunctivae normal.      Pupils: Pupils are equal, round, and reactive to light.   Cardiovascular:      Rate and Rhythm: Normal rate and regular rhythm.      Heart sounds: Normal heart sounds.   Pulmonary:      Effort: Pulmonary effort is normal.      Breath sounds: Normal breath sounds.   Abdominal:      General: Bowel sounds are normal.      Palpations: Abdomen is soft.      Tenderness: There is no abdominal tenderness.   Musculoskeletal:         General: Normal range of motion.      Cervical back: Normal range of motion and neck supple.      Right lower leg: Edema present.      Left lower leg: Edema present.   Skin:     General: Skin is warm and dry.      Comments: Groin red and excoriated.   Bilateral heels with dressings intact   Sacrum with Mepilex intact    Neurological:      General: No focal deficit present.      Mental Status: She is alert. She is disoriented.   Psychiatric:         Attention and Perception: Perception normal. She is inattentive.         Mood and Affect: Mood normal.         Speech: Speech normal.         Last Recorded Vitals  Blood pressure (!) 137/109, pulse 71, temperature 36.6 °C (97.9 °F), temperature source Temporal, resp. rate 16, height 1.575 m (5' 2\"), weight 72.6 kg (160 lb), SpO2 95 %.  Intake/Output last 3 Shifts:  I/O last 3 completed shifts:  In: 175 (2.4 mL/kg) [P.O.:75; Blood:100]  Out: - (0 mL/kg)   Weight: " 72.6 kg     Relevant Results  Results for orders placed or performed during the hospital encounter of 12/06/23 (from the past 24 hour(s))   C. difficile, PCR    Specimen: Stool   Result Value Ref Range    C. difficile, PCR Detected (A) Not Detected   Clostridium Difficile EIA    Specimen: Stool   Result Value Ref Range    C. difficile (Toxin A/B) Negative Negative, Indeterminate   POCT GLUCOSE   Result Value Ref Range    POCT Glucose 90 74 - 99 mg/dL   Body Fluid Cell Count   Result Value Ref Range    Color, Fluid Yellow Colorless, Straw, Yellow    Clarity, Fluid Clear Clear    WBC, Fluid 137 See Comment /uL    RBC, Fluid 198 0  /uL /uL   Body Fluid Differential   Result Value Ref Range    Neutrophils %, Manual, Fluid 19 <25 % %    Lymphocytes %, Manual, Fluid 69 <75 % %    Mono/Macrophages %, Manual, Fluid 12 <70 % %    Eosinophils %, Manual, Fluid 0 0 % %    Basophils %, Manual, Fluid 0 0 % %    Immature Granulocytes %, Manual, Fluid 0 0 % %    Blasts %, Manual, Fluid 0 0 % %    Unclassified Cells %, Manual, Fluid 0 0 % %    Plasma Cells %, Manual, Fluid 0 0 % %    Total Cells Counted, Fluid 100    Lactate Dehydrogenase, Fluid   Result Value Ref Range    LD, Fluid 34 Not established. U/L   POCT GLUCOSE   Result Value Ref Range    POCT Glucose 119 (H) 74 - 99 mg/dL   POCT GLUCOSE   Result Value Ref Range    POCT Glucose 64 (L) 74 - 99 mg/dL   POCT GLUCOSE   Result Value Ref Range    POCT Glucose 135 (H) 74 - 99 mg/dL   POCT GLUCOSE   Result Value Ref Range    POCT Glucose 85 74 - 99 mg/dL           Malnutrition Diagnosis Status: New  Malnutrition Diagnosis: Moderate malnutrition related to acute disease or injury  As Evidenced by: 1-2% unintentional wt loss in 1 week (~4% in 2 weeks), Poor intake <75% of estimated energy requirement > 7days, and mild fat and muscle deficits  I agree with the dietitian's malnutrition diagnosis.      Assessment/Plan   Principal Problem:    Hypotension, unspecified hypotension type               Vital signs per order               Continue midodrine               Continue low-dose Toprol in am               Hold Imdur               Blood cultures and antibiotics ordered in ER - await culture results, neg to date               Left arm fistula and history of right sided mastectomy. BP in lower extremities WNLs, but left arm BP low.    Treated overnight hypotension with albumin               Fall precautions   Active Problems:    Paroxysmal atrial fibrillation (CMS/[facility])              Continue low-dose beta blocker               Resume Eliquis this evening     Peripheral vascular disease (CMS/[facility])              Stable     Orthostatic hypotension              Continue midodrine               Check orthostatic vital signs when able     Mixed hyperlipidemia              Continue statin, fenofibrate     End-stage renal disease on hemodialysis (CMS/[facility])              Consult Dr. Mondragon               Continue Phoslo               Check AM labs    Plan for dialysis today     DM (diabetes mellitus) (CMS/[facility])              Hold Tradjenta               AC/HS glucose with SSI coverage               Hypoglycemia protocol     Bilateral heel wounds               Consult podiatry - appreciate recs    Consult ID               Wound care consult               Bilateral boots to protect heels from pressure     Groin excoriation               Wound care consult               Nystatin powder               Keep skin clean and dry               Sacrum covered with Mepilex.      C-diff    PCR positive and negative toxin.    Continues to have loose stool    Vancomycin 125 mg 4 times daily    ID consult     Addendum 4015 12/8:   Blood pressure in right arm and bilateral lower extremities low. SBPs . Reviewed with Dr. Mondragon. IV albumin ordered PRN. Do not transfer to ICU at this time.   Dialysis currently on hold due to low blood pressure. Will continue to monitor to see if response to albumin.     Susan Rashid,  APRN-CNP

## 2023-12-08 NOTE — PROGRESS NOTES
Dc plan TBD. PT rec Mod intensity. MSW went to speak with pt at bedside regarding rec but she was confused and not understanding. MSW called pt spouse to discuss dc plan but there was not answer.  did not identify recipient so MSW did not leave a message. Pt will require a precert. Pall med noted pt is not following commands and is confused. Chart review indicates the need for IV abx. Pt will need SNF. Pt will need precert.     Safe dc plan not secured-need choices and precert    Follow for IV abx final orders.     Hanane Javier MSSA, LSW

## 2023-12-08 NOTE — CONSULTS
Inpatient consult to Infectious Diseases  Consult performed by: MADHURI Youngblood-CNP  Consult ordered by: MADHURI Gaona-CNP  Reason for consult: Bilateral foor wounds          Primary MD: Deisy Conley MD        History Of Present Illness  Ayanna Gross is a 73 y.o. female with past medical history of end-stage renal disease on hemodialysis, type 2 diabetes mellitus, breast cancer, status post left mastectomy with axillary node dissection, right mastectomy, peripheral vascular disease.  She presented to the emergency room from dialysis due to hypotension.  She was admitted for further evaluation and management.   We were consulted due to bilateral foot wounds.  She denies foot pain  She is afebrile, denies chills.  She is on room air with norm al oxygenation.   Right and left foot xray reviewed with no evidence of osteomyelitis.   Chest xray showed moderate left pleural effusion. Status post thoracentesis.   Labs with mild leukocytosis. C-difficile PCR positive, EIA negative.  She was evaluated by podiatry. Vascular studies pending.  She has history of MRSA bacteremia, infective endocarditis treated with IV vancomycin, then placed of doxycycline in September.  She is on oral vancomycin.     Past Medical History  She has a past medical history of Asthma, CAD (coronary artery disease), CHF (congestive heart failure) (CMS/[facility]), Chronic kidney disease on chronic dialysis (CMS/[facility]), Hypertension, Personal history of diseases of the blood and blood-forming organs and certain disorders involving the immune mechanism, Sleep apnea, and Type 2 diabetes mellitus without complications (CMS/[facility]) (09/15/2022).    Surgical History  She has a past surgical history that includes Other surgical history (11/18/2013); Carpal tunnel release (11/18/2013); Umbilical hernia repair (11/18/2013); Tonsillectomy (11/18/2013); Hand surgery (11/18/2013); Other surgical history (11/18/2013); Other surgical history  (11/18/2013); Other surgical history (04/04/2022); Other surgical history (04/04/2022); Other surgical history (04/04/2022); Mastectomy, partial (04/10/2014); [place] lymph node biopsy (04/10/2014); US guided percutaneous placement (6/29/2023); and MR angio head wo IV contrast (8/29/2023).     Social History     Occupational History    Not on file   Tobacco Use    Smoking status: Never    Smokeless tobacco: Never   Substance and Sexual Activity    Alcohol use: Not Currently    Drug use: Never    Sexual activity: Not on file     Travel History   Travel since 11/08/23    No documented travel since 11/08/23            Family History  Family History   Problem Relation Name Age of Onset    Lymphoma Mother      Prostate cancer Father          passed away fabiana min 2017     Allergies  Patient has no known allergies.     Immunization History   Administered Date(s) Administered    Flu vaccine (IIV4), preservative free *Check age/dose* 12/01/2016    Flu vaccine, quadrivalent, high-dose, preservative free, age 65y+ (FLUZONE) 10/07/2023    Flu vaccine, quadrivalent, recombinant, preservative free, adult (FLUBLOK) 10/25/2023    Hepatitis B vaccine, adult (HEPLISAV) 06/27/2022, 08/10/2022, 09/14/2022, 11/09/2022    Hepatitis B vaccine, adult (RECOMBIVAX, ENGERIX) 07/22/2019, 08/19/2019, 09/18/2019, 01/15/2020, 04/08/2020, 05/15/2020, 06/10/2020, 09/28/2020    Influenza, High Dose Seasonal, Preservative Free 09/08/2017, 11/26/2018, 03/12/2020    Influenza, Unspecified 10/14/2012, 12/05/2014    Influenza, seasonal, injectable 10/11/2013, 12/01/2016    Moderna SARS-CoV-2 Vaccination 03/10/2021, 04/09/2021, 11/05/2021    Novel influenza-H1N1-09, preservative-free 12/07/2009    Pneumococcal polysaccharide vaccine, 23-valent, age 2 years and older (PNEUMOVAX 23) 10/11/2013, 08/28/2019    Tdap vaccine, age 7 year and older (BOOSTRIX) 12/30/2015    Zoster vaccine, recombinant, adult (SHINGRIX) 07/20/2021     Medications  Home  medications:  Medications Prior to Admission   Medication Sig Dispense Refill Last Dose    apixaban (Eliquis) 2.5 mg tablet Take 1 tablet (2.5 mg) by mouth 2 times a day.   12/6/2023 at 0900    blood sugar diagnostic strip 1 x daily e11.65 for 90 days       blood-glucose sensor (Dexcom G6 Sensor) device Check blood sugar when needed and as instructed 6 each 3     calcium acetate (Phoslo) 667 mg capsule TAKE 3 CAPSULES BY MOUTH THREE TIMES DAILY WITH MEALS   12/6/2023 at 0900    escitalopram (Lexapro) 10 mg tablet 1 tablet Orally Once a day for 30 day(s)   12/6/2023 at 0900    febuxostat (Uloric) 40 mg tablet TAKE ONE TABLET BY MOUTH EVERY OTHER DAY   12/5/2023 at 0900    fenofibrate (Triglide) 160 mg tablet Take 1 tablet (160 mg) by mouth once daily.   12/6/2023 at 0900    gabapentin (Neurontin) 300 mg capsule Take 1 capsule (300 mg) by mouth 2 times a day. MON, WED, FRI   12/6/2023 at 0900    isosorbide mononitrate ER (Imdur) 60 mg 24 hr tablet Take 1 tablet (60 mg) by mouth once daily.   12/5/2023 at 0900    linaGLIPtin (Tradjenta) 5 mg tablet 1 tablet Orally Once a day for 90 days   12/6/2023 at 0900    metoprolol succinate XL (Toprol-XL) 25 mg 24 hr tablet Take 0.5 tablets (12.5 mg) by mouth once daily.   12/6/2023 at 0900    midodrine (Proamatine) 10 mg tablet 1 tablet oral three times a day with or after meal   12/6/2023 at 0900    NON FORMULARY Needles - Novofine Insulin 30g .   as directed . bid for 90 days       nystatin (Mycostatin) 100,000 unit/gram powder APPLY 1 GRAM TO SKIN TWO TIMES A DAY 30 g 0 12/6/2023 at 0900    Carla-Brii 0.8 mg tablet 1 tablet Orally Once a day for 30 day(s)   12/6/2023 at 0900    simvastatin (Zocor) 20 mg tablet Take 2 tablets (40 mg) by mouth once daily at bedtime. 1 tablet oral daily at bedtime   12/5/2023 at 2200     Current medications:  Scheduled medications  apixaban, 2.5 mg, oral, BID  calcium acetate, 667 mg, oral, TID with meals  collagenase, , Topical,  Daily  escitalopram, 10 mg, oral, Daily  febuxostat, 40 mg, oral, Every other day  fenofibrate, 160 mg, oral, Daily  gabapentin, 300 mg, oral, BID  insulin lispro, 0-5 Units, subcutaneous, TID with meals  metoprolol succinate XL, 12.5 mg, oral, Daily  midodrine, 15 mg, oral, TID with meals  nystatin, 1 Application, Topical, BID  simvastatin, 40 mg, oral, Nightly  vancomycin, 125 mg, oral, 4x daily      Continuous medications     PRN medications  PRN medications: acetaminophen, acetaminophen, dextrose 10 % in water (D10W), dextrose, glucagon, polyethylene glycol, zinc oxide    Review of Systems   Constitutional: Negative.    HENT: Negative.     Eyes: Negative.    Respiratory: Negative.     Cardiovascular: Negative.    Gastrointestinal: Negative.    Endocrine: Negative.    Genitourinary: Negative.    Musculoskeletal: Negative.    Skin: Negative.         Groin excoriation, bilateral foot wounds   Neurological: Negative.    Psychiatric/Behavioral: Negative.          Objective  Range of Vitals (last 24 hours)  Heart Rate:  [70-78]   Temp:  [36.2 °C (97.2 °F)-36.6 °C (97.9 °F)]   Resp:  [16-19]   BP: ()/()   SpO2:  [95 %-98 %]   Daily Weight  12/06/23 : 72.6 kg (160 lb)    Body mass index is 29.26 kg/m².     Physical Exam  Constitutional:       Appearance: Normal appearance.   HENT:      Head: Normocephalic and atraumatic.      Nose: Nose normal.      Mouth/Throat:      Mouth: Mucous membranes are moist.      Pharynx: Oropharynx is clear.   Eyes:      Extraocular Movements: Extraocular movements intact.      Conjunctiva/sclera: Conjunctivae normal.   Cardiovascular:      Rate and Rhythm: Normal rate and regular rhythm.   Pulmonary:      Effort: Pulmonary effort is normal.      Breath sounds: Normal breath sounds.   Abdominal:      General: Bowel sounds are normal.      Palpations: Abdomen is soft.   Musculoskeletal:         General: Normal range of motion.      Cervical back: Normal range of motion and neck  supple.      Right lower leg: Edema present.      Left lower leg: Edema present.   Skin:     Comments: Groin excoriation, right heel wound and left heel wound with necrotic eschar, jeaneth wound with mild erythema-pictures reviewed  LUIS fistula       Neurological:      General: No focal deficit present.      Mental Status: She is alert and oriented to person, place, and time. Mental status is at baseline.   Psychiatric:         Mood and Affect: Mood normal.         Behavior: Behavior normal.          Relevant Results  Outside Hospital Results  No  Labs  Results from last 72 hours   Lab Units 12/07/23  0459 12/06/23  1325   WBC AUTO x10*3/uL 11.7* 11.1   HEMOGLOBIN g/dL 11.4* 11.8*   HEMATOCRIT % 32.1* 36.3   PLATELETS AUTO x10*3/uL 172 183   NEUTROS PCT AUTO %  --  68.2   LYMPHS PCT AUTO %  --  18.3   MONOS PCT AUTO %  --  5.8   EOS PCT AUTO %  --  6.4     Results from last 72 hours   Lab Units 12/07/23  0459 12/06/23  1325   SODIUM mmol/L 136 136   POTASSIUM mmol/L 4.1 3.8   CHLORIDE mmol/L 98 95*   CO2 mmol/L 25 32*   BUN mg/dL 6* <3*   CREATININE mg/dL 1.90* 1.20   GLUCOSE mg/dL 76 99   CALCIUM mg/dL 8.6 8.6   ANION GAP mmol/L 13 9   EGFR mL/min/1.73m*2 28* 48*     Results from last 72 hours   Lab Units 12/07/23  0459 12/06/23  1325   ALK PHOS U/L 78 88   BILIRUBIN TOTAL mg/dL 1.2 1.0   BILIRUBIN DIRECT mg/dL  --  0.5*   PROTEIN TOTAL g/dL 5.7* 6.3   ALT U/L 21 24   AST U/L 40 42*   ALBUMIN g/dL 2.2* 2.6*     Estimated Creatinine Clearance: 25.3 mL/min (A) (by C-G formula based on SCr of 1.9 mg/dL (H)).  CRP   Date Value Ref Range Status   06/23/2023 19.9 (H) 0 - 2.0 MG/DL Final     Comment:     [facility] [place] [address] [facility] [address]   05/22/2022 1.0 0 - 2.0 MG/DL Final     Comment:     Performed at [address] [address]   12/13/2020 10.1 (H) 0 - 2.0 MG/DL Final     Comment:     Performed at [address] [address]     Sedimentation Rate   Date  "Value Ref Range Status   06/23/2023 125 (H) 0 - 30 MM/HR Final     Comment:     Performed at [address] [address]   05/22/2022 113 (H) 0 - 20 MM/HR Final     Comment:     [facility] [place] [address] [facility] [address]   12/13/2020 100 (H) 0 - 20 MM/HR Final     Comment:     Performed at [place] [id number]     No results found for: \"HIV1X2\", \"HIVCONF\", \"KSDCEL2MP\"  No results found for: \"HEPCABINIT\", \"HEPCAB\", \"HCVPCRQUANT\"  Microbiology  12/7-c-difficile PCR positive, EIA negative  12/7-blood culture pending  12/7-pleural fluid pending        Imaging  XR chest 1 view    Result Date: 12/8/2023  Interpreted By:  Maria Garcia, STUDY: XR CHEST 1 VIEW 12/8/2023 11:57 am   INDICATION: Signs/Symptoms:S/P thoracentesis   COMPARISON: 12/06/2023   ACCESSION NUMBER(S): [id number]   ORDERING CLINICIAN: JEZ LONG   TECHNIQUE: AP erect view of the chest at bedside   FINDINGS: There is an interval decrease in size of left pleural effusion when compared with the study done 2 days earlier. No pneumothorax is seen.   There is some residual left pleural effusion with infiltrates seen in the left mid and lower lung field. The right lung is clear.   There are surgical clips within the left axilla. Stent grafts are visible in the left subclavian region extending into the left axillary region. A tunneled right jugular dialysis catheter terminates within right atrium.       Decreased size of left effusion following ultrasound-guided thoracentesis with no pneumothorax.   There is some residual left pleural effusion noted with infiltrate in the left mid and lower lung field.   Signed by: Maria Garcia 12/8/2023 12:51 PM Dictation workstation:   [id number]    XR foot 3+ views bilateral    Result Date: 12/7/2023  Interpreted By:  Brendon Hook, STUDY: XR FOOT 3+ VIEWS BILATERAL; 12/7/2023 3:31 pm   INDICATION: Signs/Symptoms:Wound/Abscess/Acute neha process rule " out/Comparative Exam.   COMPARISON: None   ACCESSION NUMBER(S): [id number]   ORDERING CLINICIAN: JOSSELYN STAHL   TECHNIQUE: Nonweightbearing AP, lateral and oblique views of the bilateral foot were performed.   FINDINGS: Right foot: Large heel spur is seen. Mild Achilles enthesopathy demonstrated. Moderate atherosclerotic disease is seen. There is suggestion of loss of the arch on this nonweightbearing view. Degenerative change anterior plafond demonstrated and dorsal talus as well as posterior plafond and dorsal talus. Sclerosis anterior calcaneus is seen. Degenerative changes mild between the midfoot and forefoot. 1st metatarsophalangeal joint degenerative changes demonstrated. 2nd ray absent phalanges. Distal phalanges of the 3rd through 5th rays with overlap were difficult to visualize. Distal phalanx of the great toe is difficult to visualize.   Left foot: Large heel spur is seen. Advanced atherosclerotic disease demonstrated. Degenerative change in the calcaneus and talus seen with moderate anterior and mild posterior plafond degenerative change. Mild degenerative change in the midfoot and forefoot is seen. Distal phalanx of the great toe is absent. Degenerative change of the 2nd ray phalanges and 3rd ray phalanges seen with degenerative change of the 4th and 5th digits not well seen.       1. Right foot: Large heel spur. Atherosclerotic disease. Suggestion of diffuse osteopenia. No sclerosis to suggest osteomyelitis. No subcutaneous air to suggest gangrene. Absent flanges 2nd ray with difficult to visualized distal phalanges of the other digits.   2. Left foot: Large heel spur. Degenerative changes of the hindfoot midfoot and forefoot with atherosclerotic disease. Absent distal phalanx of the great toe. No periostitis or abnormal sclerosis with diffuse osteopenia to suggest osteomyelitis. No subcutaneous air to suggest gangrene.       Signed by: Brendon Hook 12/7/2023 8:39 PM Dictation workstation:    [id number]    ECG 12 Lead    Result Date: 12/7/2023   Poor data quality, interpretation may be adversely affected Normal sinus rhythm Right bundle branch block Septal infarct , age undetermined Possible Lateral infarct , age undetermined Abnormal ECG No previous ECGs available Confirmed by Hammad Callahan ([id number]) on 12/7/2023 1:07:11 PM    XR chest 1 view    Result Date: 12/6/2023  Interpreted By:  Roger Murdock, STUDY: XR CHEST 1 VIEW;  12/6/2023 2:08 pm   INDICATION: Signs/Symptoms:Brief AMS and hypotension, now resolved.   COMPARISON: 08/21/2023..   ACCESSION NUMBER(S): [id number]   ORDERING CLINICIAN: PATEL TINSLEY   FINDINGS: New moderate left pleural effusion. There is silhouetting of the left heart border. Left subclavian stent present. Right-sided double-lumen central venous catheter terminates within the right ventricle. No acute osseous findings.       New, moderate left pleural effusion.     MACRO: None   Signed by: Roger Murdock 12/6/2023 2:18 PM Dictation workstation:   [id number]     Assessment/Plan   Unstageable Bilateral heel ulcers-rule out infection  C-difficile infection  Type 2 diabetes mellitus  Pleural effusion, s/p thoracentesis  History of chronic MRSA bacteremia, infective endocarditis-completed IV vancomycin, now on Doxycycline   Hypotension  ESRD oh hemodialysis        IV vancomycin-pending culture results-pharmacy to dose  Oral vancomycin  Monitor temperature WBC  Follow blood culture  Follow up PVR  Local care  Offloading  Pre albumin  Follow thoracentesis  Monitor stool  Podiatry follow up  Contact precautions          MADHURI Youngblood-CNP

## 2023-12-08 NOTE — NURSING NOTE
0718  Assuming pt care.    0830  In for pt assmt and meds. Pt confused to year and which place. Pt is not cooperative to help with turn/rep and cleaning of incon stool. Pt does not follow commands. Pt jeaneth area red and painful. Cleaned and placed barrier with nystatin. Turn/rep. Pt denies cp, sob, abdominal and calf pain, nt. Pt refuses to take meds. Alarm on. Will continue to round and monitor.    1200  Spoke to out pt from [facility], asking for echo order. Deferred to secure chat gregorio nelson.    1500  Still having trouble with getting reliable bp and o2 reading, placed on 2l nc for occasional appearance of 85% ra. 96% on 2l. HD can not start with the readings rec'd. Tech notifying dr nunn.    1521  Paged gregorio nelson to inform of bps all over the place but recently very low. Pt is awake and alert and cooperative but to inform of the drastic differences in readings.    1645  Dial tech spoke to edouard to go ahead with HD but monitor mentation. Stop if becomes symptomatic. Bps still up and down.     1745  Pt awakes and arrousable despite bps, cooperative, turn/rep. Pt gave permission to speak to family/dtrs.

## 2023-12-08 NOTE — CONSULTS
Wound Care Consult     Visit Date: 12/8/2023      Patient Name: Ayanna Gross         MRN: [id number]           YOB: 1950     Reason for Consult: Bilateral heel wounds and Bilateral groin and buttock rash     Wound History: Present on admission. Bilateral heel ulcers being followed and managed by Podiatry. Large yeast rash to Bilateral groin, periarea, Bilateral buttock and upper thighs. Patient +C-diff and having active diarrhea. Patient was being treated by [facility] for Diabetic heel ulcers.      A 73 y.o. year old female admitted for Principal Problem:    Hypotension, unspecified hypotension type  Active Problems:    Paroxysmal atrial fibrillation (CMS/[facility])    Peripheral vascular disease (CMS/[facility])    Orthostatic hypotension    Mixed hyperlipidemia    End-stage renal disease on hemodialysis (CMS/[facility])    DM (diabetes mellitus) (CMS/[facility])    Absent pedal pulses      Past Medical History:   Diagnosis Date    Asthma     CAD (coronary artery disease)     CHF (congestive heart failure) (CMS/[facility])     Chronic kidney disease on chronic dialysis (CMS/[facility])     Hypertension     Personal history of diseases of the blood and blood-forming organs and certain disorders involving the immune mechanism     History of autoimmune disorder    Sleep apnea     Type 2 diabetes mellitus without complications (CMS/[facility]) 09/15/2022    Diabetes mellitus      Past Surgical History:   Procedure Laterality Date    CARPAL TUNNEL RELEASE  11/18/2013    Neuroplasty Decompression Median Nerve At Carpal Tunnel    HAND SURGERY  11/18/2013    Hand Surgery                                                                                                                                                          MASTECTOMY, PARTIAL  04/10/2014    Right Breast Partial Mastectomy    MR HEAD ANGIO WO IV CONTRAST  8/29/2023    MR HEAD ANGIO WO IV CONTRAST [facility]    OTHER SURGICAL HISTORY  11/18/2013    Breast Reconstruction With  Implant Prosthesis    OTHER SURGICAL HISTORY  11/18/2013    Thyroid Surgery Substernal Thyroidectomy Partial    OTHER SURGICAL HISTORY  11/18/2013    Modified Radical Mastectomy Left Breast    OTHER SURGICAL HISTORY  04/04/2022    Carpal tunnel surgery    OTHER SURGICAL HISTORY  04/04/2022    Foot surgery    OTHER SURGICAL HISTORY  04/04/2022    Hernia repair    SENTINEL LYMPH NODE BIOPSY  04/10/2014    [place] Lymph Node Biopsy    TONSILLECTOMY  11/18/2013    Tonsillectomy    UMBILICAL HERNIA REPAIR  11/18/2013    Umbilical Hernia Repair    US GUIDED PERCUTANEOUS PLACEMENT  6/29/2023    US GUIDED PERCUTANEOUS PLACEMENT [facility]       Scheduled medications  apixaban, 2.5 mg, oral, BID  calcium acetate, 667 mg, oral, TID with meals  collagenase, , Topical, Daily  escitalopram, 10 mg, oral, Daily  febuxostat, 40 mg, oral, Every other day  fenofibrate, 160 mg, oral, Daily  gabapentin, 300 mg, oral, BID  insulin lispro, 0-5 Units, subcutaneous, TID with meals  metoprolol succinate XL, 12.5 mg, oral, Daily  midodrine, 15 mg, oral, TID with meals  nystatin, 1 Application, Topical, BID  simvastatin, 40 mg, oral, Nightly  vancomycin, 125 mg, oral, 4x daily  [START ON 12/9/2023] vancomycin, 1,750 mg, intravenous, After Dialysis      Continuous medications     PRN medications  PRN medications: acetaminophen, acetaminophen, albumin human, dextrose 10 % in water (D10W), dextrose, glucagon, polyethylene glycol, zinc oxide    No Known Allergies      Pertinent Labs:   [place], Urine   Date Value Ref Range Status   02/24/2019 1,315 (H) 0 - 23 MG/L Final     Comment:     RESULT CHECKED     Albumin   Date Value Ref Range Status   12/07/2023 2.2 (L) 3.5 - 5.0 g/dL Final     Albumin, SPE   Date Value Ref Range Status   02/26/2019 3.22  Reference range: 3.37 to 4.23  Unit: gm/dL   (L)  Final     Albumin/Protein Total, Ur   Date Value Ref Range Status   02/24/2019 77.5  Unit: %    Final       Wound Assessment:  Wound  12/06/23 Diabetic Ulcer Heel Left (Active)   Wound Image   12/07/23 1100   Dressing Gauze 12/08/23 0852   Dressing Status Clean;Dry 12/08/23 0852       Wound 12/06/23 Diabetic Ulcer Heel Right (Active)   Wound Image    12/07/23 1100   Dressing Gauze 12/08/23 0852   Dressing Status Dry;Other (Comment) 12/08/23 0852       Wound 12/06/23 Buttocks Bilateral (Active)   Wound Image   12/07/23 1100   Site Assessment Red 12/08/23 0852   Dressing Foam 12/08/23 0852       Wound 12/06/23 Other (comment) Groin Bilateral (Active)   Wound Image    12/07/23 1322   Site Assessment Red;Excoriated 12/08/23 0852   Dressing Open to air 12/08/23 0852       Wound Team Summary Assessment:     Exam conducted on day 2 of stay with knowledge of Floor Nurse. Introductions made to patient, patient confused and on Isolation precautions for C-diff. On exam patient lying in bed. ET nurse assisted Nurse in turning and cleaning patient due to liquid stool incontinence. Patient has large area of redness to periarea, groin, bilateral thighs, and buttock. Mixture of Nystatin and Calazime being applied. Also has prn order for Zinc Oxide cream. Patient is very tender in these areas. Bilateral heels in Tru[employer]ue boots. Dressings clean, dry, intact. Bilateral heels being managed by Podiatry. Will clarify orders. Patient is on a [facility] bedframe Accumax mattress with Waffle mattress overlay. Darcy Concepcion RN updated, to continue pressure injury prevention interventions, Woundcare, and nursing to continue to follow providers orders. Reconsult Wound RN PRN. Message sent to Dr. Campos to clarify woundcare orders. Per Dr. Andres Faulkner, DPM is currently managing orders and placed order for Santyl to be placed at bedside for woundcare. To be provided by Podiatry at this time. Diamond CHACON RN     Wound Team Plan: Continue to follow Provider orders. Bilateral heels currently managed by Podiatry. Will continue to monitor.   Bilateral  groin/thighs/buttock- cleanse with soap and water, pat dry, apply mixture of Nystatin powder and Calazime to reddened areas daily and prn.      Diamond Argueta RN  12/8/2023  3:43 PM

## 2023-12-08 NOTE — PROGRESS NOTES
Vancomycin Dosing by Pharmacy- INITIAL    Ayanna Gross is a 73 y.o. year old female who Pharmacy has been consulted for vancomycin dosing for endocarditis/endovascular infection. Based on the patient's indication and renal status this patient will be dosed based on a goal pre-HD level of 15-25.     Renal function is currently stable.    Visit Vitals  BP (!) 157/109   Pulse 71   Temp 36.6 °C (97.9 °F) (Temporal)   Resp 16        Lab Results   Component Value Date    CREATININE 3.30 (H) 12/08/2023    CREATININE 1.90 (H) 12/07/2023    CREATININE 1.20 12/06/2023    CREATININE 3.5 (H) 08/30/2023    CREATININE 3.3 (H) 08/28/2023    CREATININE 3.6 (H) 08/26/2023    CREATININE 2.7 (H) 08/25/2023        I/O last 3 completed shifts:  In: 175 (2.4 mL/kg) [P.O.:75; Blood:100]  Out: - (0 mL/kg)   Weight: 72.6 kg       Assessment/Plan     Patient will be given a loading dose of 1750 mg.  Will initiate vancomycin maintenance after checking level pre-dialysis  Follow-up level will be ordered on 12/11 at 0500 unless clinically indicated sooner.  Will continue to monitor renal function daily while on vancomycin and order serum creatinine at least every 48 hours if not already ordered.  Follow for continued vancomycin needs, clinical response, and signs/symptoms of toxicity.       Bhakti Casey RP

## 2023-12-08 NOTE — PROGRESS NOTES
"Ayanna Gross is a 73 y.o. female on day 2 of admission presenting with Hypotension, unspecified hypotension type.    Subjective   Patient seen and examined at bedside today.  Patient states she is doing well overall.  Patient has no complaints at this time.  Patient denies any pain at this time.       Objective     REVIEW OF SYSTEMS  A 10 point review of systems was completed and was negative except where otherwise stated.    Physical Exam    Objective:     Last Recorded Vitals  Blood pressure (!) 137/109, pulse 71, temperature 36.6 °C (97.9 °F), temperature source Temporal, resp. rate 16, height 1.575 m (5' 2\"), weight 72.6 kg (160 lb), SpO2 95 %.    Vasc: DP and PT pulses are none palpable bilateral, Doppler not available. CFT is delayed.  Skin temperature is warm to cool proximal to distal bilateral. No significant erythema or edema noted.     Neuro:  Light touch is absent to the foot bilateral.  Protective sensation is absent.  There is no clonus noted.  Denies any numbness, burning or tingling     Derm:  Skin is diffusely xerotic with scaling and peeling noted.  Webspaces are clean, dry and intact bilateral. Full thickness ulceration noted to the posterior heel bilaterally. Ulcerations are covered with black necrotic eschar.  Mild bogginess noted to the ulceration sites with serous drainage noted.  Mild BALAJI wound erythema not extending more than 0.5 cm.  No tunneling or undermining noted.  Wound noted to plantar right heel.  Significant hyperkeratotic tissue covering the area of the ulceration.  Still mild serous drainage.  No purulence noted at this time to any of the wounds.  No deep structures noted within the base of any of the wounds.     MSK: Muscle strength is 3/5 for all pedal groups tested.  Ankle joint, subtalar joint, 1st MPJ and lesser MPJ ROM are all significantly decreased. No pain to palpation at feet B/L.  Right foot has amputations of digits 1 through 3 with 1 and 3 being partial " "amputations.  Left foot has a partial amputation of digit 1.        Intake/Output last 3 Shifts:  I/O last 3 completed shifts:  In: 175 (2.4 mL/kg) [P.O.:75; Blood:100]  Out: - (0 mL/kg)   Weight: 72.6 kg     Relevant Results         Lab Results   Component Value Date    WBC 11.7 (H) 12/07/2023    HGB 11.4 (L) 12/07/2023    HCT 32.1 (L) 12/07/2023    MCV 81 12/07/2023     12/07/2023       Lab Results   Component Value Date    GLUCOSE 76 12/07/2023    CALCIUM 8.6 12/07/2023     12/07/2023    K 4.1 12/07/2023    CO2 25 12/07/2023    CL 98 12/07/2023    BUN 6 (L) 12/07/2023    CREATININE 1.90 (H) 12/07/2023       Lab Results   Component Value Date    HGBA1C 8.8 (H) 06/25/2023      Lab Results   Component Value Date    CRP 19.9 (H) 06/23/2023      Lab Results   Component Value Date    SEDRATE 125 (H) 06/23/2023     No components found for: \"CMP\"       Results from last 7 days   Lab Units 12/07/23  0459   SODIUM mmol/L 136   POTASSIUM mmol/L 4.1   CHLORIDE mmol/L 98   CO2 mmol/L 25   BUN mg/dL 6*   CREATININE mg/dL 1.90*   CALCIUM mg/dL 8.6   BILIRUBIN TOTAL mg/dL 1.2   ALT U/L 21   AST U/L 40             IMAGING REVIEW:           ASSESSMENT & PLAN:  Assessment/Plan   Principal Problem:    Hypotension, unspecified hypotension type  Active Problems:    Paroxysmal atrial fibrillation (CMS/[facility])    Peripheral vascular disease (CMS/[facility])    Orthostatic hypotension    Mixed hyperlipidemia    End-stage renal disease on hemodialysis (CMS/[facility])    DM (diabetes mellitus) (CMS/[facility])    Absent pedal pulses      Assessment:  Bilateral heel wounds  T2DM  Mild leukocytosis     Plan:  -Patient seen and examined at bedside. All findings discussed with patient.    -Mildly elevated white count.  Goal is to rule out foot infection.  Recommend IV antibiotics per primary for emperic coverage. New labs pending  -We will obtain PVRs/ABIs. Vascular consult may be needed after. PVRs Pending  -Blood culture pending  -Xrays negative for " OM  -Dressings placed today consisting of Betadine, Adaptic, 4 x 4, Kerlix  -No plan for acute surgical intervention at this time however this may change depending on results of studies we have ordered.  -Patient already has bilateral heel boots for offloading.  Will consider ordering postop shoes.  -Podiatry will continue to follow  -Thank you for the consult     Diet: Per primary  DVT ppx: Per primary  Weightbearing: Nonweightbearing BLE except for transfers.  Wound Care: Betadine, Adaptic, 4 x 4, Kerlix     Case has been discussed with attending, A&P above reflect tentative plan. Please await final signature from attending physician on service.  Florentin Faulkner, FINAM PGY-3  Please use Secure Chat if any questions       Agree with residents assessment and plan.

## 2023-12-08 NOTE — TELEPHONE ENCOUNTER
Notified GEMA Palumbo that echo has not been completed for patient who requests to cancel the appointment for 12/12/23 and to reschedule once the Echo has been completed. Left  for house and cell.

## 2023-12-08 NOTE — CONSULTS
CONSULT: Nephrology SERVICE    SERVICE DATE: 12/8/2023   SERVICE TIME:  1:41 PM    REASON FOR CONSULT: End-stage renal disease  REQUESTING PHYSICIAN: Susan Rashid CNP  PRIMARY CARE PHYSICIAN: Deisy Conley MD    ASSESSMENT AND PLAN  73-year-old with numerous medical problems including end-stage renal disease admitted for hypotension.  1.  End-stage renal disease  2.  Hypotension  3.  Fluid overload  4.  Anemia of chronic renal disease    Received regular dialysis 2 days ago.  She is in need of hemodialysis today according to her usual schedule.  Given the intermittent hypotension, use 1 L ultrafiltration, low temperature dialysate, 4-hour treatment time.  I would maintain the midodrine if she is able to swallow.  We can give her as needed albumin during treatment.  I do not think she needs aggressive fluid removal at present.  Appreciated blood cultures were drawn, appreciate broad-spectrum antibiotics.  Right now she is on treatment for C. difficile colitis and she had 750 mL removed on a thoracentesis.  Hopefully she starts feeling better soon.  Hemoglobin is at goal.  Case discussed with Joshua Godfrey CNP and Susan Rashid CNP.  Thank you for the consultation.  Dr. Andrade is available this weekend.     SUBJECTIVE  Ms. Gross is a 73 y.o. woman with a history of coronary artery disease, CHF, and end-stage renal disease admitted with hypotension, consulted for dialysis management.  This woman regularly dialyzes on a Monday, Wednesday, Friday basis at [facility] [place].  Dialysis has lately been complicated by hypotension.  We have drawn blood cultures on dialysis, especially given her history of recent endocarditis.  She did complete a prolonged course of IV vancomycin with dialysis.  She had also been on oral doxycycline.  When I evaluated her today, she was very tired, difficult to arouse.  The blood pressure has been more elevated during this recent hospitalization.  She does have some sparkle blood pressure  readings of 70 systolic.  I have been following this woman for numerous years on dialysis and she chronically runs hypotensive, not uncommonly in the 80s systolic during dialysis.  She is rarely symptomatic.  During this admission, she underwent a thoracentesis, which was yesterday for 750 mL.  She also has been diagnosed with C. difficile colitis and has commenced on oral vancomycin.  She required IV albumin last night.    PAST MEDICAL HISTORY:   Past Medical History:   Diagnosis Date    Asthma     CAD (coronary artery disease)     CHF (congestive heart failure) (CMS/Formerly [facility])     Chronic kidney disease on chronic dialysis (CMS/Formerly [facility])     Hypertension     Personal history of diseases of the blood and blood-forming organs and certain disorders involving the immune mechanism     History of autoimmune disorder    Sleep apnea     Type 2 diabetes mellitus without complications (CMS/Formerly [facility]) 09/15/2022    Diabetes mellitus     PAST SURGICAL HISTORY:   Past Surgical History:   Procedure Laterality Date    CARPAL TUNNEL RELEASE  11/18/2013    Neuroplasty Decompression Median Nerve At Carpal Tunnel    HAND SURGERY  11/18/2013    Hand Surgery                                                                                                                                                          MASTECTOMY, PARTIAL  04/10/2014    Right Breast Partial Mastectomy    MR HEAD ANGIO WO IV CONTRAST  8/29/2023    MR HEAD ANGIO WO IV CONTRAST [facility] INPATIENT LEGACY    OTHER SURGICAL HISTORY  11/18/2013    Breast Reconstruction With Implant Prosthesis    OTHER SURGICAL HISTORY  11/18/2013    Thyroid Surgery Substernal Thyroidectomy Partial    OTHER SURGICAL HISTORY  11/18/2013    Modified Radical Mastectomy Left Breast    OTHER SURGICAL HISTORY  04/04/2022    Carpal tunnel surgery    OTHER SURGICAL HISTORY  04/04/2022    Foot surgery    OTHER SURGICAL HISTORY  04/04/2022    Hernia repair    SENTINEL LYMPH NODE BIOPSY  04/10/2014    [place] Lymph  Node Biopsy    TONSILLECTOMY  11/18/2013    Tonsillectomy    UMBILICAL HERNIA REPAIR  11/18/2013    Umbilical Hernia Repair    US GUIDED PERCUTANEOUS PLACEMENT  6/29/2023    US GUIDED PERCUTANEOUS PLACEMENT [facility] INPATIENT LEGACY     FAMILY HISTORY:   Family History   Problem Relation Name Age of Onset    Lymphoma Mother      Prostate cancer Father          passed away fabiana min 2017     SOCIAL HISTORY:   Social History     Tobacco Use    Smoking status: Never    Smokeless tobacco: Never   Substance Use Topics    Alcohol use: Not Currently    Drug use: Never     MEDICATIONS:  apixaban, 2.5 mg, oral, BID  calcium acetate, 667 mg, oral, TID with meals  collagenase, , Topical, Daily  escitalopram, 10 mg, oral, Daily  febuxostat, 40 mg, oral, Every other day  fenofibrate, 160 mg, oral, Daily  gabapentin, 300 mg, oral, BID  insulin lispro, 0-5 Units, subcutaneous, TID with meals  metoprolol succinate XL, 12.5 mg, oral, Daily  midodrine, 15 mg, oral, TID with meals  nystatin, 1 Application, Topical, BID  simvastatin, 40 mg, oral, Nightly  vancomycin, 1,750 mg, intravenous, After Dialysis  vancomycin, 125 mg, oral, 4x daily           PRN medications: acetaminophen, acetaminophen, dextrose 10 % in water (D10W), dextrose, glucagon, polyethylene glycol, zinc oxide   CURRENT ALLERGIES:   Allergies as of 12/06/2023    (No Known Allergies)       COMPLETE REVIEW OF SYSTEMS:    Full ROS was negative unless mentioned above    OBJECTIVE  PHYSICAL EXAM  Heart Rate:  [70-78]   Temp:  [36.2 °C (97.2 °F)-36.6 °C (97.9 °F)]   Resp:  [16-19]   BP: ()/()   SpO2:  [95 %-98 %]    Body mass index is 29.26 kg/m².  This is a chronically ill-appearing mildly toxic obese white woman  Very pale skin  Hearing intact  Phonation intact  Very dry oral mucosa  Regular heart rate  Unlabored breathing  Abdomen is soft, nondistended, nontender, positive bowel sounds  No Amos catheter in place, no suprapubic tenderness to  palpation  Bilateral lower extremity edema  Moves 4 limbs spontaneously  No obvious joint deformities  No lymphadenopathy  Right internal jugular tunneled hemodialysis catheter  Missing her index finger on her left hand  She has failed fistula in her left upper extremity    DATA:   Labs:  Results for orders placed or performed during the hospital encounter of 12/06/23 (from the past 96 hour(s))   ECG 12 Lead   Result Value Ref Range    Ventricular Rate 100 BPM    Atrial Rate 85 BPM    ME Interval 206 ms    QRS Duration 138 ms    QT Interval 488 ms    QTC Calculation(Bazett) 629 ms    P Axis 72 degrees    R Axis 137 degrees    T Axis 107 degrees    QRS Count 16 beats    Q Onset 229 ms    P Onset 126 ms    P Offset 170 ms    T Offset 473 ms    QTC Fredericia 579 ms   Basic Metabolic Panel   Result Value Ref Range    Glucose 99 65 - 99 mg/dL    Sodium 136 133 - 145 mmol/L    Potassium 3.8 3.4 - 5.1 mmol/L    Chloride 95 (L) 97 - 107 mmol/L    Bicarbonate 32 (H) 24 - 31 mmol/L    Urea Nitrogen <3 (L) 8 - 25 mg/dL    Creatinine 1.20 0.40 - 1.60 mg/dL    eGFR 48 (L) >60 mL/min/1.73m*2    Calcium 8.6 8.5 - 10.4 mg/dL    Anion Gap 9 <=19 mmol/L   Blood Gas Lactic Acid, Venous   Result Value Ref Range    POCT Lactate, Venous 3.0 (H) 0.4 - 2.0 mmol/L   CBC and Auto Differential   Result Value Ref Range    WBC 11.1 4.4 - 11.3 x10*3/uL    nRBC 0.0 0.0 - 0.0 /100 WBCs    RBC 4.13 4.00 - 5.20 x10*6/uL    Hemoglobin 11.8 (L) 12.0 - 16.0 g/dL    Hematocrit 36.3 36.0 - 46.0 %    MCV 88 80 - 100 fL    MCH 28.6 26.0 - 34.0 pg    MCHC 32.5 32.0 - 36.0 g/dL    RDW 20.6 (H) 11.5 - 14.5 %    Platelets 183 150 - 450 x10*3/uL    Neutrophils % 68.2 40.0 - 80.0 %    Immature Granulocytes %, Automated 0.5 0.0 - 0.9 %    Lymphocytes % 18.3 13.0 - 44.0 %    Monocytes % 5.8 2.0 - 10.0 %    Eosinophils % 6.4 0.0 - 6.0 %    Basophils % 0.8 0.0 - 2.0 %    Neutrophils Absolute 7.57 (H) 1.60 - 5.50 x10*3/uL    Immature Granulocytes Absolute, Automated  0.06 0.00 - 0.50 x10*3/uL    Lymphocytes Absolute 2.03 0.80 - 3.00 x10*3/uL    Monocytes Absolute 0.64 0.05 - 0.80 x10*3/uL    Eosinophils Absolute 0.71 (H) 0.00 - 0.40 x10*3/uL    Basophils Absolute 0.09 0.00 - 0.10 x10*3/uL   Hepatic Function Panel   Result Value Ref Range    AST 42 (H) 5 - 40 U/L    ALT 24 5 - 40 U/L    Alkaline Phosphatase 88 35 - 125 U/L    Bilirubin, Total 1.0 0.1 - 1.2 mg/dL    Bilirubin, Direct 0.5 (H) 0.0 - 0.2 mg/dL    Total Protein 6.3 5.9 - 7.9 g/dL    Albumin 2.6 (L) 3.5 - 5.0 g/dL   Troponin T   Result Value Ref Range    Troponin T, High Sensitivity 213 (H) <=15 ng/L   Morphology   Result Value Ref Range    RBC Morphology See Below     Spherocytes Few     Target Cells Few     Garland Cells Few     Acanthocytes Few     Clumped Platelets Present    Blood Gas Lactic Acid, Venous   Result Value Ref Range    POCT Lactate, Venous 2.3 (H) 0.4 - 2.0 mmol/L   Troponin T   Result Value Ref Range    Troponin T, High Sensitivity 205 (H) <=15 ng/L   Blood Culture    Specimen: Peripheral Venipuncture; Blood culture   Result Value Ref Range    Blood Culture Loaded on Instrument - Culture in progress    Blood Culture    Specimen: Peripheral Venipuncture; Blood culture   Result Value Ref Range    Blood Culture Loaded on Instrument - Culture in progress    CBC   Result Value Ref Range    WBC 11.7 (H) 4.4 - 11.3 x10*3/uL    nRBC 0.0 0.0 - 0.0 /100 WBCs    RBC 3.97 (L) 4.00 - 5.20 x10*6/uL    Hemoglobin 11.4 (L) 12.0 - 16.0 g/dL    Hematocrit 32.1 (L) 36.0 - 46.0 %    MCV 81 80 - 100 fL    MCH 28.7 26.0 - 34.0 pg    MCHC 35.5 32.0 - 36.0 g/dL    RDW 20.2 (H) 11.5 - 14.5 %    Platelets 172 150 - 450 x10*3/uL   Comprehensive metabolic panel   Result Value Ref Range    Glucose 76 65 - 99 mg/dL    Sodium 136 133 - 145 mmol/L    Potassium 4.1 3.4 - 5.1 mmol/L    Chloride 98 97 - 107 mmol/L    Bicarbonate 25 24 - 31 mmol/L    Urea Nitrogen 6 (L) 8 - 25 mg/dL    Creatinine 1.90 (H) 0.40 - 1.60 mg/dL    eGFR 28 (L)  >60 mL/min/1.73m*2    Calcium 8.6 8.5 - 10.4 mg/dL    Albumin 2.2 (L) 3.5 - 5.0 g/dL    Alkaline Phosphatase 78 35 - 125 U/L    Total Protein 5.7 (L) 5.9 - 7.9 g/dL    AST 40 5 - 40 U/L    Bilirubin, Total 1.2 0.1 - 1.2 mg/dL    ALT 21 5 - 40 U/L    Anion Gap 13 <=19 mmol/L   POCT GLUCOSE   Result Value Ref Range    POCT Glucose 72 (L) 74 - 99 mg/dL   C. difficile, PCR    Specimen: Stool   Result Value Ref Range    C. difficile, PCR Detected (A) Not Detected   Clostridium Difficile EIA    Specimen: Stool   Result Value Ref Range    C. difficile (Toxin A/B) Negative Negative, Indeterminate   POCT GLUCOSE   Result Value Ref Range    POCT Glucose 90 74 - 99 mg/dL   Body Fluid Cell Count   Result Value Ref Range    Color, Fluid Yellow Colorless, Straw, Yellow    Clarity, Fluid Clear Clear    WBC, Fluid 137 See Comment /uL    RBC, Fluid 198 0  /uL /uL   Body Fluid Differential   Result Value Ref Range    Neutrophils %, Manual, Fluid 19 <25 % %    Lymphocytes %, Manual, Fluid 69 <75 % %    Mono/Macrophages %, Manual, Fluid 12 <70 % %    Eosinophils %, Manual, Fluid 0 0 % %    Basophils %, Manual, Fluid 0 0 % %    Immature Granulocytes %, Manual, Fluid 0 0 % %    Blasts %, Manual, Fluid 0 0 % %    Unclassified Cells %, Manual, Fluid 0 0 % %    Plasma Cells %, Manual, Fluid 0 0 % %    Total Cells Counted, Fluid 100    Lactate Dehydrogenase, Fluid   Result Value Ref Range    LD, Fluid 34 Not established. U/L   POCT GLUCOSE   Result Value Ref Range    POCT Glucose 119 (H) 74 - 99 mg/dL   POCT GLUCOSE   Result Value Ref Range    POCT Glucose 64 (L) 74 - 99 mg/dL   POCT GLUCOSE   Result Value Ref Range    POCT Glucose 135 (H) 74 - 99 mg/dL   POCT GLUCOSE   Result Value Ref Range    POCT Glucose 85 74 - 99 mg/dL   POCT GLUCOSE   Result Value Ref Range    POCT Glucose 84 74 - 99 mg/dL   Basic metabolic panel   Result Value Ref Range    Glucose 106 (H) 65 - 99 mg/dL    Sodium 136 133 - 145 mmol/L    Potassium 3.9 3.4 - 5.1 mmol/L     Chloride 99 97 - 107 mmol/L    Bicarbonate 28 24 - 31 mmol/L    Urea Nitrogen 12 8 - 25 mg/dL    Creatinine 3.30 (H) 0.40 - 1.60 mg/dL    eGFR 14 (L) >60 mL/min/1.73m*2    Calcium 8.7 8.5 - 10.4 mg/dL    Anion Gap 9 <=19 mmol/L   CBC   Result Value Ref Range    WBC 11.3 4.4 - 11.3 x10*3/uL    nRBC 0.0 0.0 - 0.0 /100 WBCs    RBC 3.78 (L) 4.00 - 5.20 x10*6/uL    Hemoglobin 10.7 (L) 12.0 - 16.0 g/dL    Hematocrit 33.5 (L) 36.0 - 46.0 %    MCV 89 80 - 100 fL    MCH 28.3 26.0 - 34.0 pg    MCHC 31.9 (L) 32.0 - 36.0 g/dL    RDW 21.1 (H) 11.5 - 14.5 %    Platelets 158 150 - 450 x10*3/uL       SIGNATURE: Saeed Mondragon MD PATIENT NAME: Ayanna Gross   DATE: December 8, 2023 MRN: [id number]   TIME: 1:41 PM PAGER: [phone]

## 2023-12-09 NOTE — NURSING NOTE
Bilateral heel ulcer dressings changed, painted with betadine, adaptic and gauze applied, covered with kerlix.    0530hrs: CHG bath done.

## 2023-12-09 NOTE — NURSING NOTE
Patient turned and repositioned, had another small BM, incontinence care done, mixture of nystatin and calazime cream applied to sore areas.

## 2023-12-09 NOTE — NURSING NOTE
Assuming care of patient, report received from FLAQUITO Concepcion. Patient currently getting hemodialysis.    2100hrs: Dilaysis completed, no fluid removed per HD tech. Patient repositioned and turned, with sore and areas all over the jeaneth-area and buttocks. Patient with Hd catheter on the right chest. With fistula on the left upper arm. With multiple ulcer on the hand and fingers and bilateral heels, dressing dry and intact, with offloading boots in place. Call light within reach. Plan of care ongoing.

## 2023-12-09 NOTE — PROGRESS NOTES
Ayanna Gross is a 73 y.o. female on day 3 of admission presenting with Hypotension, unspecified hypotension type.      Subjective   No acute events, dialyzed yesterday with no major issues.  Recurrently hypotensive but asymptomatic,?  Accuracy of blood pressures       Objective          Vitals 24HR  Heart Rate:  []   Temp:  [35.8 °C (96.4 °F)-36.6 °C (97.9 °F)]   Resp:  [18-22]   BP: ()/()   SpO2:  [94 %-98 %]         Intake/Output last 3 Shifts:    Intake/Output Summary (Last 24 hours) at 12/9/2023 1426  Last data filed at 12/9/2023 0316  Gross per 24 hour   Intake 1100 ml   Output 400 ml   Net 700 ml       Physical Exam  Gen: NAD  HENT: atraumatic  Heart: RRR  Lungs: CTA  Abdomen: Soft  Ext; no edema  Neuro: non focal  Psych : AAO x 3    Relevant Results               Assessment/Plan      73-year-old female with end-stage renal disease  Recurrent hypotension    -Dialysis yesterday went okay, recurrently hypotensive but asymptomatic.?  Accuracy of blood pressure readings given her vasculopathies and relative lack of symptoms  -Next dialysis on Monday    Ernesto Andrade MD

## 2023-12-09 NOTE — INDIVIDUALIZED OVERALL PLAN OF CARE NOTE
Chart reviewed  Patient's  never returned my calls.  Continues as a FULL CODE at this point  Aggressive and disease-modifying model of care.  Awaiting prealbumin to help quantify degree of malnutrition.  Agree with nephrology's question about accuracy of blood pressures.    Appreciate ID input regarding her chronic MRSA bacteremia and possible endocarditis  On suppressive therapy doxycycline    Dialysis as per nephrology    Nothing more to add at the moment, unless we are able to get in contact with the patient's  as I do not feel that she is a reliable medical decision maker.    Available as needed, please call if concerns or questions arise.    Nursing: please call or haiku me if the patient's  comes to the hospital so that we can engage in further goals of care discussion.

## 2023-12-09 NOTE — PROGRESS NOTES
"Ayanna Gross is a 73 y.o. female on day 3 of admission presenting with Hypotension, unspecified hypotension type.    Subjective   Patient resting in bed. Continues to have leg pain to touch. Denies chest pain, shortness of breath, abdominal pain,        Objective     Physical Exam  Constitutional:       Appearance: Normal appearance.   HENT:      Head: Normocephalic and atraumatic.      Mouth/Throat:      Mouth: Mucous membranes are moist.      Pharynx: Oropharynx is clear.   Eyes:      Extraocular Movements: Extraocular movements intact.      Conjunctiva/sclera: Conjunctivae normal.      Pupils: Pupils are equal, round, and reactive to light.   Cardiovascular:      Rate and Rhythm: Normal rate and regular rhythm.      Heart sounds: Normal heart sounds.   Pulmonary:      Effort: Pulmonary effort is normal.      Breath sounds: Normal breath sounds.   Abdominal:      General: Bowel sounds are normal.      Palpations: Abdomen is soft.      Tenderness: There is no abdominal tenderness.   Musculoskeletal:         General: Normal range of motion.      Cervical back: Normal range of motion and neck supple.   Skin:     General: Skin is warm and dry.      Capillary Refill: Capillary refill takes less than 2 seconds.      Comments: Groin excoriated and red   Bilateral heel wounds with dressings intact   Sacrum covered with Mepilex    Neurological:      General: No focal deficit present.      Mental Status: She is alert and oriented to person, place, and time.   Psychiatric:         Mood and Affect: Mood normal.         Behavior: Behavior normal.         Last Recorded Vitals  Blood pressure (!) 128/117, pulse 83, temperature 36.4 °C (97.5 °F), temperature source Temporal, resp. rate 18, height 1.575 m (5' 2\"), weight 72.6 kg (160 lb), SpO2 98 %.  Intake/Output last 3 Shifts:  I/O last 3 completed shifts:  In: 1400 (19.3 mL/kg) [I.V.:800 (11 mL/kg); Blood:150; Other:400; IV Piggyback:50]  Out: 400 (5.5 mL/kg) " [Other:400]  Weight: 72.6 kg     Relevant Results  Results for orders placed or performed during the hospital encounter of 12/06/23 (from the past 24 hour(s))   POCT GLUCOSE   Result Value Ref Range    POCT Glucose 84 74 - 99 mg/dL   Basic metabolic panel   Result Value Ref Range    Glucose 106 (H) 65 - 99 mg/dL    Sodium 136 133 - 145 mmol/L    Potassium 3.9 3.4 - 5.1 mmol/L    Chloride 99 97 - 107 mmol/L    Bicarbonate 28 24 - 31 mmol/L    Urea Nitrogen 12 8 - 25 mg/dL    Creatinine 3.30 (H) 0.40 - 1.60 mg/dL    eGFR 14 (L) >60 mL/min/1.73m*2    Calcium 8.7 8.5 - 10.4 mg/dL    Anion Gap 9 <=19 mmol/L   CBC   Result Value Ref Range    WBC 11.3 4.4 - 11.3 x10*3/uL    nRBC 0.0 0.0 - 0.0 /100 WBCs    RBC 3.78 (L) 4.00 - 5.20 x10*6/uL    Hemoglobin 10.7 (L) 12.0 - 16.0 g/dL    Hematocrit 33.5 (L) 36.0 - 46.0 %    MCV 89 80 - 100 fL    MCH 28.3 26.0 - 34.0 pg    MCHC 31.9 (L) 32.0 - 36.0 g/dL    RDW 21.1 (H) 11.5 - 14.5 %    Platelets 158 150 - 450 x10*3/uL   POCT GLUCOSE   Result Value Ref Range    POCT Glucose 118 (H) 74 - 99 mg/dL   POCT GLUCOSE   Result Value Ref Range    POCT Glucose 89 74 - 99 mg/dL   Basic metabolic panel   Result Value Ref Range    Glucose 98 65 - 99 mg/dL    Sodium 139 133 - 145 mmol/L    Potassium 3.2 (L) 3.4 - 5.1 mmol/L    Chloride 100 97 - 107 mmol/L    Bicarbonate 30 24 - 31 mmol/L    Urea Nitrogen 5 (L) 8 - 25 mg/dL    Creatinine 1.70 (H) 0.40 - 1.60 mg/dL    eGFR 32 (L) >60 mL/min/1.73m*2    Calcium 8.6 8.5 - 10.4 mg/dL    Anion Gap 9 <=19 mmol/L   CBC   Result Value Ref Range    WBC 10.6 4.4 - 11.3 x10*3/uL    nRBC 0.0 0.0 - 0.0 /100 WBCs    RBC 3.79 (L) 4.00 - 5.20 x10*6/uL    Hemoglobin 10.8 (L) 12.0 - 16.0 g/dL    Hematocrit 33.0 (L) 36.0 - 46.0 %    MCV 87 80 - 100 fL    MCH 28.5 26.0 - 34.0 pg    MCHC 32.7 32.0 - 36.0 g/dL    RDW 21.1 (H) 11.5 - 14.5 %    Platelets 110 (L) 150 - 450 x10*3/uL           Malnutrition Diagnosis Status: New  Malnutrition Diagnosis: Moderate malnutrition  related to acute disease or injury  As Evidenced by: 1-2% unintentional wt loss in 1 week (~4% in 2 weeks), Poor intake <75% of estimated energy requirement > 7days, and mild fat and muscle deficits  I agree with the dietitian's malnutrition diagnosis.      Assessment/Plan   Principal Problem:    Hypotension, unspecified hypotension type              Vital signs per order               Continue midodrine               Continue low-dose Toprol in am               Hold Imdur               Blood cultures and antibiotics ordered in ER - await culture results - neg to date               Left arm fistula and history of right sided mastectomy. BP different in upper extremities than lower extremities.               Hypotension improved with albumin                Fall precautions   Active Problems:    Paroxysmal atrial fibrillation (CMS/Formerly [facility])              Continue low-dose beta blocker               Eliquis for stroke prophylaxis     Peripheral vascular disease (CMS/Formerly [facility])              Stable     Orthostatic hypotension              Continue midodrine               Check orthostatic vital signs when able     Mixed hyperlipidemia              Continue statin, fenofibrate     End-stage renal disease on hemodialysis (CMS/Formerly [facility])              Consult Dr. Mondragon - appreciate recs              Continue Phoslo               Check AM labs               Dialysis 12/8    DM (diabetes mellitus) (CMS/Formerly [facility])              Hold Tradjenta               AC/HS glucose with SSI coverage               Hypoglycemia protocol     Bilateral heel wounds               Consult podiatry - appreciate recs               Consult ID -  appreciate recs               Wound care consult               Bilateral boots to protect heels from pressure    Awaiting vascular studies     Groin excoriation               Wound care consult               Nystatin powder               Keep skin clean and dry               Sacrum covered with Mepilex.      C-diff                PCR positive and negative toxin.               Continues to have loose stool               Vancomycin 125 mg 4 times daily               ID consult - appreciate recs     Susan Rashid, APRN-CNP

## 2023-12-09 NOTE — CARE PLAN
The patient's goals for the shift include      The clinical goals for the shift include improved skin integrity, improved blood pressure    Over the shift, the patient did not make progress toward the following goals. Barriers to progression include . Recommendations to address these barriers include .

## 2023-12-09 NOTE — PROGRESS NOTES
"Ayanna Gross is a 73 y.o. female on day 3 of admission presenting with Hypotension, unspecified hypotension type.    Subjective   Patient seen and examined at bedside today.  Resting comfortably.  Patient states she is doing okay.  Patient was on the phone with family.  Patient denies any current issues and has no other new complaints.       Objective       Physical Exam    Objective:     Last Recorded Vitals  Blood pressure (!) 150/115, pulse 83, temperature 36.4 °C (97.5 °F), temperature source Oral, resp. rate 18, height 1.575 m (5' 2\"), weight 72.6 kg (160 lb), SpO2 98 %.    Vasc: DP and PT pulses are none palpable bilateral, Doppler not available. CFT is delayed.  Skin temperature is warm to cool proximal to distal bilateral. No significant erythema or edema noted.     Neuro:  Light touch is absent to the foot bilateral.  Protective sensation is absent.  There is no clonus noted.  Denies any numbness, burning or tingling     Derm:  Skin is diffusely xerotic with scaling and peeling noted.  Webspaces are clean, dry and intact bilateral. Full thickness ulceration noted to the posterior heel bilaterally. Ulcerations are covered with black necrotic eschar, improved from prior exam.  Mild bogginess noted to the ulceration sites with minimal serous drainage noted.  Mild BALAJI wound erythema.  No tunneling or undermining noted.  Lesion noted to plantar aspect of right heel. majority of hyperkeratotic tissue removed with mild hyperkeratotic tissue remaining.  There appears to be intact dermis beneath.  Still mild serous drainage.  No purulence noted at this time to any of the wounds.  No deep structures noted within the base of any of the wounds.     MSK: Muscle strength is 3/5 for all pedal groups tested.  Ankle joint, subtalar joint, 1st MPJ and lesser MPJ ROM are all significantly decreased. No pain to palpation at feet B/L.  Right foot has amputations of digits 1 through 3 with 1 and 3 being partial amputations.  " "Left foot has a partial amputation of digit 1.           Intake/Output last 3 Shifts:  I/O last 3 completed shifts:  In: 1400 (19.3 mL/kg) [I.V.:800 (11 mL/kg); Blood:150; Other:400; IV Piggyback:50]  Out: 400 (5.5 mL/kg) [Other:400]  Weight: 72.6 kg     Relevant Results        Lab Results   Component Value Date    WBC 10.6 12/09/2023    HGB 10.8 (L) 12/09/2023    HCT 33.0 (L) 12/09/2023    MCV 87 12/09/2023     (L) 12/09/2023       Lab Results   Component Value Date    GLUCOSE 98 12/09/2023    CALCIUM 8.6 12/09/2023     12/09/2023    K 3.2 (L) 12/09/2023    CO2 30 12/09/2023     12/09/2023    BUN 5 (L) 12/09/2023    CREATININE 1.70 (H) 12/09/2023       Lab Results   Component Value Date    HGBA1C 8.8 (H) 06/25/2023      Lab Results   Component Value Date    CRP 19.9 (H) 06/23/2023      Lab Results   Component Value Date    SEDRATE 125 (H) 06/23/2023     No components found for: \"CMP\"       Results from last 7 days   Lab Units 12/09/23  0455 12/08/23  1239 12/07/23  0459   SODIUM mmol/L 139   < > 136   POTASSIUM mmol/L 3.2*   < > 4.1   CHLORIDE mmol/L 100   < > 98   CO2 mmol/L 30   < > 25   BUN mg/dL 5*   < > 6*   CREATININE mg/dL 1.70*   < > 1.90*   CALCIUM mg/dL 8.6   < > 8.6   BILIRUBIN TOTAL mg/dL  --   --  1.2   ALT U/L  --   --  21   AST U/L  --   --  40    < > = values in this interval not displayed.             IMAGING REVIEW:           ASSESSMENT & PLAN:  Assessment/Plan   Principal Problem:    Hypotension, unspecified hypotension type  Active Problems:    Paroxysmal atrial fibrillation (CMS/HCC)    Peripheral vascular disease (CMS/HCC)    Orthostatic hypotension    Mixed hyperlipidemia    End-stage renal disease on hemodialysis (CMS/HCC)    DM (diabetes mellitus) (CMS/HCC)    Absent pedal pulses      Assessment:  Bilateral heel wounds  T2DM  Mild leukocytosis     Plan:  -Patient seen and examined at bedside. All findings discussed with patient.    -White count is trending down.  " Recommend continued IV antibiotics per primary for emperic coverage.  -We will obtain PVRs/ABIs. Vascular consult may be needed after.  OCTAVIO/PVR still pending  -Blood culture no growth  -Xrays negative for OM  -Dressings placed today consisting of Santyl, Adaptic, 4 x 4, ABD, Kerlix  -No plan for acute surgical intervention at this time however this may change depending on results of studies we have ordered.  -Patient already has bilateral heel boots for offloading.  Will consider ordering postop shoes.  -Podiatry will continue to follow     Diet: Per primary  DVT ppx: Per primary  Weightbearing: Nonweightbearing BLE except for transfers.  Toe-touch weightbearing for transfers only as needed.  Wound Care: Santyl, Adaptic, 4 x 4, ABD, Kerlix     Case has been discussed with attending, A&P above reflect tentative plan. Please await final signature from attending physician on service.  Florentin Faulkner, DPM PGY-3  Please use Secure Chat if any questions

## 2023-12-09 NOTE — PROGRESS NOTES
Ayanna Gross is a 73 y.o. female on day 3 of admission presenting with Hypotension, unspecified hypotension type.    Subjective   Interval History:   Afebrile, no chills  No chest pain or shortness of breath  No nausea vomiting or diarrhea        Review of Systems   All other systems reviewed and are negative.      Objective   Range of Vitals (last 24 hours)  Heart Rate:  []   Temp:  [35.8 °C (96.4 °F)-36.6 °C (97.9 °F)]   Resp:  [18-22]   BP: ()/()   SpO2:  [94 %-98 %]   Daily Weight  12/06/23 : 72.6 kg (160 lb)    Body mass index is 29.26 kg/m².    Physical Exam  Constitutional:       Appearance: Normal appearance.   HENT:      Head: Normocephalic and atraumatic.      Nose: Nose normal.      Mouth/Throat:      Mouth: Mucous membranes are moist.      Pharynx: Oropharynx is clear.   Eyes:      Extraocular Movements: Extraocular movements intact.      Conjunctiva/sclera: Conjunctivae normal.   Cardiovascular:      Rate and Rhythm: Normal rate and regular rhythm.   Pulmonary:      Effort: Pulmonary effort is normal.      Breath sounds: Normal breath sounds.   Abdominal:      General: Bowel sounds are normal.      Palpations: Abdomen is soft.   Musculoskeletal:         General: Normal range of motion.      Cervical back: Normal range of motion and neck supple.      Right lower leg: Edema present.      Left lower leg: Edema present.   Skin:     Comments: Groin excoriation, right heel wound and left heel wound with necrotic eschar, jeaneth wound with mild erythema-pictures reviewed  LUIS fistula        Neurological:      General: No focal deficit present.      Mental Status: She is alert and oriented to person, place, and time. Mental status is at baseline.   Psychiatric:         Mood and Affect: Mood normal.         Behavior: Behavior normal.     Antibiotics  sodium chloride 0.9 % bolus 500 mL  cefepime (Maxipime) 1 g in dextrose 5 % 50 mL IV  vancomycin-diluent combo no.1 (Xellia) IVPB 1 g  apixaban  (Eliquis) tablet 2.5 mg  calcium acetate (Phoslo) capsule 2,000 mg  escitalopram (Lexapro) tablet 10 mg  febuxostat (Uloric) tablet 40 mg  fenofibrate (Triglide) tablet 160 mg  gabapentin (Neurontin) capsule 300 mg  metoprolol succinate XL (Toprol-XL) 24 hr tablet 12.5 mg  midodrine (Proamatine) tablet 10 mg  nystatin (Mycostatin) 100,000 unit/gram powder 1 Application  B complex-vitamin C tablet 1 tablet  simvastatin (Zocor) tablet 40 mg  acetaminophen (Tylenol) tablet 650 mg  acetaminophen (Tylenol) tablet 650 mg  polyethylene glycol (Glycolax, Miralax) packet 17 g  dextrose 50 % injection 25 g  glucagon (Glucagen) injection 1 mg  dextrose 10 % in water (D10W) infusion  insulin lispro (HumaLOG) injection 0-5 Units  zinc oxide 20 % ointment 1 Application  calcium acetate (Phoslo) capsule 667 mg  vancomycin (Vancocin) capsule 125 mg  lidocaine PF (Xylocaine) 10 mg/mL (1 %) injection  heparin 1,000 unit/mL injection 2,000 Units  heparin 1,000 unit/mL injection 2,000 Units  midodrine (Proamatine) tablet 15 mg  albumin human 25 % solution 25 g  polyethylene glycol (Glycolax, Miralax) packet 17 g  collagenase 250 unit/gram ointment  vancomycin (Vancocin) 1,750 mg in dextrose 5 % in water (D5W) 250 mL IV  vancomycin-diluent combo no.1 (Xellia) IVPB 1,750 mg  albumin human 25 % solution 12.5 g  heparin 1,000 unit/mL injection 2,000 Units  heparin 1,000 unit/mL injection 2,000 Units  vancomycin (Vancocin) placeholder      Relevant Results  Labs  Results from last 72 hours   Lab Units 12/09/23  0455 12/08/23  1239 12/07/23  0459 12/06/23  1325   WBC AUTO x10*3/uL 10.6 11.3 11.7* 11.1   HEMOGLOBIN g/dL 10.8* 10.7* 11.4* 11.8*   HEMATOCRIT % 33.0* 33.5* 32.1* 36.3   PLATELETS AUTO x10*3/uL 110* 158 172 183   NEUTROS PCT AUTO %  --   --   --  68.2   LYMPHS PCT AUTO %  --   --   --  18.3   MONOS PCT AUTO %  --   --   --  5.8   EOS PCT AUTO %  --   --   --  6.4     Results from last 72 hours   Lab Units 12/09/23  5595  12/08/23  1239 12/07/23  0459   SODIUM mmol/L 139 136 136   POTASSIUM mmol/L 3.2* 3.9 4.1   CHLORIDE mmol/L 100 99 98   CO2 mmol/L 30 28 25   BUN mg/dL 5* 12 6*   CREATININE mg/dL 1.70* 3.30* 1.90*   GLUCOSE mg/dL 98 106* 76   CALCIUM mg/dL 8.6 8.7 8.6   ANION GAP mmol/L 9 9 13   EGFR mL/min/1.73m*2 32* 14* 28*     Results from last 72 hours   Lab Units 12/07/23  0459 12/06/23  1325   ALK PHOS U/L 78 88   BILIRUBIN TOTAL mg/dL 1.2 1.0   BILIRUBIN DIRECT mg/dL  --  0.5*   PROTEIN TOTAL g/dL 5.7* 6.3   ALT U/L 21 24   AST U/L 40 42*   ALBUMIN g/dL 2.2* 2.6*     Estimated Creatinine Clearance: 28.3 mL/min (A) (by C-G formula based on SCr of 1.7 mg/dL (H)).  CRP   Date Value Ref Range Status   06/23/2023 19.9 (H) 0 - 2.0 MG/DL Final     Comment:     Performed at [address] [address]   05/22/2022 1.0 0 - 2.0 MG/DL Final     Comment:     Performed at [address] [address]   12/13/2020 10.1 (H) 0 - 2.0 MG/DL Final     Comment:     Performed at [address] [address]     Microbiology  Reviewed-12/7-pleural fluid culture pending  12/7-positive C. difficile PCR  12/7-blood cultures pending  Imaging  US thoracentesis    Result Date: 12/8/2023  Interpreted By:  Sandro Hernandez, STUDY: US THORACENTESIS; 12/7/2023 3:16 pm   INDICATION: Signs/Symptoms:Left pleural effusion question of whether this needs thoracentesis, history of ESRD, just had dialysis today.   COMPARISON: None   ACCESSION NUMBER(S): [id number]   ORDERING CLINICIAN: JEZ LONG   TECHNIQUE: Informed consent obtained. Patient positionedsitting upright. Skin prepped, draped and anesthetized. Under ultrasound guidance, a centesis catheter/needle was advanced into rightpleural cavity.   FINDINGS: A total of 750 cc of clear yellow fluid was aspirated. A sample was sent for analysis. The patient tolerated the procedure well.       Ultrasound-guided left thoracentesis.     Signed by: Sandro  David 12/8/2023 3:01 PM Dictation workstation:   [id number]    XR chest 1 view    Result Date: 12/8/2023  Interpreted By:  Maria Garcia, STUDY: XR CHEST 1 VIEW 12/8/2023 11:57 am   INDICATION: Signs/Symptoms:S/P thoracentesis   COMPARISON: 12/06/2023   ACCESSION NUMBER(S): [id number]   ORDERING CLINICIAN: JEZ LONG   TECHNIQUE: AP erect view of the chest at bedside   FINDINGS: There is an interval decrease in size of left pleural effusion when compared with the study done 2 days earlier. No pneumothorax is seen.   There is some residual left pleural effusion with infiltrates seen in the left mid and lower lung field. The right lung is clear.   There are surgical clips within the left axilla. Stent grafts are visible in the left subclavian region extending into the left axillary region. A tunneled right jugular dialysis catheter terminates within right atrium.       Decreased size of left effusion following ultrasound-guided thoracentesis with no pneumothorax.   There is some residual left pleural effusion noted with infiltrate in the left mid and lower lung field.   Signed by: Maira Garcia 12/8/2023 12:51 PM Dictation workstation:   [id number]    XR foot 3+ views bilateral    Result Date: 12/7/2023  Interpreted By:  Brendon Hook, STUDY: XR FOOT 3+ VIEWS BILATERAL; 12/7/2023 3:31 pm   INDICATION: Signs/Symptoms:Wound/Abscess/Acute neha process rule out/Comparative Exam.   COMPARISON: None   ACCESSION NUMBER(S): [id number]   ORDERING CLINICIAN: JOSSELYN STAHL   TECHNIQUE: Nonweightbearing AP, lateral and oblique views of the bilateral foot were performed.   FINDINGS: Right foot: Large heel spur is seen. Mild Achilles enthesopathy demonstrated. Moderate atherosclerotic disease is seen. There is suggestion of loss of the arch on this nonweightbearing view. Degenerative change anterior plafond demonstrated and dorsal talus as well as posterior plafond and dorsal talus. Sclerosis anterior calcaneus is  seen. Degenerative changes mild between the midfoot and forefoot. 1st metatarsophalangeal joint degenerative changes demonstrated. 2nd ray absent phalanges. Distal phalanges of the 3rd through 5th rays with overlap were difficult to visualize. Distal phalanx of the great toe is difficult to visualize.   Left foot: Large heel spur is seen. Advanced atherosclerotic disease demonstrated. Degenerative change in the calcaneus and talus seen with moderate anterior and mild posterior plafond degenerative change. Mild degenerative change in the midfoot and forefoot is seen. Distal phalanx of the great toe is absent. Degenerative change of the 2nd ray phalanges and 3rd ray phalanges seen with degenerative change of the 4th and 5th digits not well seen.       1. Right foot: Large heel spur. Atherosclerotic disease. Suggestion of diffuse osteopenia. No sclerosis to suggest osteomyelitis. No subcutaneous air to suggest gangrene. Absent flanges 2nd ray with difficult to visualized distal phalanges of the other digits.   2. Left foot: Large heel spur. Degenerative changes of the hindfoot midfoot and forefoot with atherosclerotic disease. Absent distal phalanx of the great toe. No periostitis or abnormal sclerosis with diffuse osteopenia to suggest osteomyelitis. No subcutaneous air to suggest gangrene.       Signed by: Brendon Hook 12/7/2023 8:39 PM Dictation workstation:   [id number]    ECG 12 Lead    Result Date: 12/7/2023   Poor data quality, interpretation may be adversely affected Normal sinus rhythm Right bundle branch block Septal infarct , age undetermined Possible Lateral infarct , age undetermined Abnormal ECG No previous ECGs available Confirmed by Hammad Callahan ([id number]) on 12/7/2023 1:07:11 PM    XR chest 1 view    Result Date: 12/6/2023  Interpreted By:  Roger Mudrock, STUDY: XR CHEST 1 VIEW;  12/6/2023 2:08 pm   INDICATION: Signs/Symptoms:Brief AMS and hypotension, now resolved.   COMPARISON: 08/21/2023..   ACCESSION  NUMBER(S): [id number]   ORDERING CLINICIAN: PATEL TINSLEY   FINDINGS: New moderate left pleural effusion. There is silhouetting of the left heart border. Left subclavian stent present. Right-sided double-lumen central venous catheter terminates within the right ventricle. No acute osseous findings.       New, moderate left pleural effusion.     MACRO: None   Signed by: Roger Murdock 12/6/2023 2:18 PM Dictation workstation:   [id number]    Assessment/Plan   Unstageable Bilateral heel ulcers-rule out infection  C-difficile infection  Type 2 diabetes mellitus  Pleural effusion, s/p thoracentesis  History of chronic MRSA bacteremia, infective endocarditis-completed IV vancomycin, now on Doxycycline   Hypotension  ESRD oh hemodialysis           IV vancomycin-pending culture results-pharmacy to dose  Oral vancomycin-treatment for C. difficile  Oral doxycycline as suppressive therapy for MRSA bacteremia with possible infective endocarditis  Monitor temperature WBC  Follow blood cultures  Follow up PVR  Local care  Offloading  Follow-up prealbumin  Follow pleural fluid culture  Monitor stool  Podiatry follow up  Contact precautions  Discontinue vancomycin if cultures remain negative      Stephen Coulter MD

## 2023-12-10 NOTE — PROGRESS NOTES
"Ayanna Gross is a 73 y.o. female on day 4 of admission presenting with Hypotension, unspecified hypotension type.    Subjective   Patient resting in bed. Denies chest pain, shortness of breath, abdominal pain, fevers, chills.        Objective     Physical Exam  Constitutional:       Appearance: Normal appearance.   HENT:      Head: Normocephalic and atraumatic.      Mouth/Throat:      Mouth: Mucous membranes are moist.      Pharynx: Oropharynx is clear.   Eyes:      Extraocular Movements: Extraocular movements intact.      Conjunctiva/sclera: Conjunctivae normal.      Pupils: Pupils are equal, round, and reactive to light.   Cardiovascular:      Rate and Rhythm: Normal rate and regular rhythm.      Pulses: Normal pulses.      Heart sounds: Normal heart sounds.   Pulmonary:      Effort: Pulmonary effort is normal.      Breath sounds: Normal breath sounds.   Abdominal:      General: Bowel sounds are normal.      Palpations: Abdomen is soft.      Tenderness: There is no abdominal tenderness.   Musculoskeletal:         General: Normal range of motion.      Cervical back: Normal range of motion and neck supple.   Skin:     General: Skin is warm and dry.      Comments: Groin excoriation improving  Bilateral heels with dressings intact      Neurological:      General: No focal deficit present.      Mental Status: She is alert and oriented to person, place, and time.   Psychiatric:         Mood and Affect: Mood normal.         Behavior: Behavior normal.         Last Recorded Vitals  Blood pressure (!) 141/95, pulse 74, temperature 36 °C (96.8 °F), resp. rate 17, height 1.575 m (5' 2\"), weight 73.9 kg (163 lb), SpO2 94 %.  Intake/Output last 3 Shifts:  I/O last 3 completed shifts:  In: 1275 (17.2 mL/kg) [P.O.:125; I.V.:400 (5.4 mL/kg); Other:400; IV Piggyback:350]  Out: 400 (5.4 mL/kg) [Other:400]  Weight: 73.9 kg     Relevant Results  Results for orders placed or performed during the hospital encounter of 12/06/23 (from " the past 24 hour(s))   POCT GLUCOSE   Result Value Ref Range    POCT Glucose 88 74 - 99 mg/dL   POCT GLUCOSE   Result Value Ref Range    POCT Glucose 99 74 - 99 mg/dL   POCT GLUCOSE   Result Value Ref Range    POCT Glucose 164 (H) 74 - 99 mg/dL   Basic metabolic panel   Result Value Ref Range    Glucose 117 (H) 65 - 99 mg/dL    Sodium 138 133 - 145 mmol/L    Potassium 3.3 (L) 3.4 - 5.1 mmol/L    Chloride 100 97 - 107 mmol/L    Bicarbonate 25 24 - 31 mmol/L    Urea Nitrogen 9 8 - 25 mg/dL    Creatinine 2.70 (H) 0.40 - 1.60 mg/dL    eGFR 18 (L) >60 mL/min/1.73m*2    Calcium 8.5 8.5 - 10.4 mg/dL    Anion Gap 13 <=19 mmol/L   CBC   Result Value Ref Range    WBC 10.5 4.4 - 11.3 x10*3/uL    nRBC 0.0 0.0 - 0.0 /100 WBCs    RBC 3.75 (L) 4.00 - 5.20 x10*6/uL    Hemoglobin 10.7 (L) 12.0 - 16.0 g/dL    Hematocrit 33.6 (L) 36.0 - 46.0 %    MCV 90 80 - 100 fL    MCH 28.5 26.0 - 34.0 pg    MCHC 31.8 (L) 32.0 - 36.0 g/dL    RDW 21.2 (H) 11.5 - 14.5 %    Platelets 120 (L) 150 - 450 x10*3/uL   POCT GLUCOSE   Result Value Ref Range    POCT Glucose 84 74 - 99 mg/dL           Malnutrition Diagnosis Status: New  Malnutrition Diagnosis: Moderate malnutrition related to acute disease or injury  As Evidenced by: 1-2% unintentional wt loss in 1 week (~4% in 2 weeks), Poor intake <75% of estimated energy requirement > 7days, and mild fat and muscle deficits  I agree with the dietitian's malnutrition diagnosis.      Assessment/Plan   Principal Problem:    Hypotension, unspecified hypotension type              Vital signs per order               Continue midodrine               Continue low-dose Toprol in am               Hold Imdur               Blood cultures and antibiotics ordered in ER - await culture results - neg to date               Left arm fistula and history of right sided mastectomy. BP different in upper extremities than lower extremities.               Hypotension improved with albumin                Fall precautions   Active  Problems:    Paroxysmal atrial fibrillation (CMS/[facility])              Continue low-dose beta blocker               Eliquis for stroke prophylaxis     Peripheral vascular disease (CMS/[facility])   Awaiting vascular studies of lower extremities     Orthostatic hypotension              Continue midodrine               Check orthostatic vital signs when able     Mixed hyperlipidemia              Continue statin, fenofibrate     End-stage renal disease on hemodialysis (CMS/[facility])              Consult Dr. Mondragon - appreciate recs              Continue Phoslo               Check AM labs               Dialysis 12/8 - plan for dialysis 12/11    DM (diabetes mellitus) (CMS/[facility])              Hold Tradjenta               AC/HS glucose with SSI coverage               Hypoglycemia protocol     Bilateral heel wounds               Consult podiatry - appreciate recs               Consult ID -  appreciate recs               Wound care consult               Bilateral boots to protect heels from pressure   Awaiting vascular studies - I left message with vascular department yesterday (12/9) asking for study to be completed as soon as able.     Groin excoriation               Wound care consult               Nystatin powder               Keep skin clean and dry               Sacrum covered with Mepilex.      C-diff               PCR positive and negative toxin.               Vancomycin 125 mg 4 times daily               ID consult - appreciate recs    Loose stools improved     Susan Rashid APRN-CNP

## 2023-12-10 NOTE — CARE PLAN
Problem: Diabetes  Goal: Achieve decreasing blood glucose levels by end of shift  Outcome: Progressing  Goal: Increase stability of blood glucose readings by end of shift  Outcome: Progressing  Goal: Decrease in ketones present in urine by end of shift  Outcome: Progressing  Goal: Maintain electrolyte levels within acceptable range throughout shift  Outcome: Progressing  Goal: Maintain glucose levels >70mg/dl to <250mg/dl throughout shift  Outcome: Progressing  Goal: No changes in neurological exam by end of shift  Outcome: Progressing  Goal: Learn about and adhere to nutrition recommendations by end of shift  Outcome: Progressing  Goal: Vital signs within normal range for age by end of shift  Outcome: Progressing  Goal: Increase self care and/or family involovement by end of shift  Outcome: Progressing  Goal: Receive DSME education by end of shift  Outcome: Progressing     Problem: Pain - Adult  Goal: Verbalizes/displays adequate comfort level or baseline comfort level  Outcome: Progressing     Problem: Safety - Adult  Goal: Free from fall injury  Outcome: Progressing     Problem: Discharge Planning  Goal: Discharge to home or other facility with appropriate resources  Outcome: Progressing     Problem: Chronic Conditions and Co-morbidities  Goal: Patient's chronic conditions and co-morbidity symptoms are monitored and maintained or improved  Outcome: Progressing     Problem: Skin  Goal: Decreased wound size/increased tissue granulation at next dressing change  Outcome: Progressing  Flowsheets (Taken 12/10/2023 0254)  Decreased wound size/increased tissue granulation at next dressing change:   Promote sleep for wound healing   Utilize specialty bed per algorithm   Protective dressings over bony prominences  Goal: Participates in plan/prevention/treatment measures  Outcome: Progressing  Flowsheets (Taken 12/10/2023 0254)  Participates in plan/prevention/treatment measures:   Elevate heels   Discuss with provider  PT/OT consult  Goal: Prevent/manage excess moisture  Outcome: Progressing  Flowsheets (Taken 12/10/2023 0254)  Prevent/manage excess moisture:   Monitor for/manage infection if present   Cleanse incontinence/protect with barrier cream   Follow provider orders for dressing changes   Moisturize dry skin  Goal: Prevent/minimize sheer/friction injuries  Outcome: Progressing  Flowsheets (Taken 12/10/2023 0254)  Prevent/minimize sheer/friction injuries:   Use pull sheet   HOB 30 degrees or less   Turn/reposition every 2 hours/use positioning/transfer devices   Utilize specialty bed per algorithm  Goal: Promote/optimize nutrition  Outcome: Progressing  Flowsheets (Taken 12/10/2023 0254)  Promote/optimize nutrition:   Consume > 50% meals/supplements   Offer water/supplements/favorite foods   Assist with feeding   Monitor/record intake including meals  Goal: Promote skin healing  Outcome: Progressing  Flowsheets (Taken 12/10/2023 0254)  Promote skin healing:   Turn/reposition every 2 hours/use positioning/transfer devices   Protective dressings over bony prominences   Assess skin/pad under line(s)/device(s)     Problem: Fall/Injury  Goal: Not fall by end of shift  Outcome: Progressing  Goal: Be free from injury by end of the shift  Outcome: Progressing  Goal: Verbalize understanding of personal risk factors for fall in the hospital  Outcome: Progressing  Goal: Verbalize understanding of risk factor reduction measures to prevent injury from fall in the home  Outcome: Progressing  Goal: Use assistive devices by end of the shift  Outcome: Progressing  Goal: Pace activities to prevent fatigue by end of the shift  Outcome: Progressing     Problem: Nutrition  Goal: Less than 5 days NPO/clear liquids  Outcome: Progressing  Goal: Oral intake greater than 50%  Outcome: Progressing  Goal: Oral intake greater 75%  Outcome: Progressing  Goal: Consume prescribed supplement  Outcome: Progressing  Goal: Adequate PO fluid intake  Outcome:  Progressing  Goal: Nutrition support goals are met within 48 hrs  Outcome: Progressing  Goal: Nutrition support is meeting 75% of nutrient needs  Outcome: Progressing  Goal: Tube feed tolerance  Outcome: Progressing  Goal: BG  mg/dL  Outcome: Progressing  Goal: Lab values WNL  Outcome: Progressing  Goal: Electrolytes WNL  Outcome: Progressing  Goal: Promote healing  Outcome: Progressing  Goal: Maintain stable weight  Outcome: Progressing  Goal: Reduce weight from edema/fluid  Outcome: Progressing  Goal: Gradual weight gain  Outcome: Progressing  Goal: Improve ostomy output  Outcome: Progressing   The patient's goals for the shift include      The clinical goals for the shift include Improved skin integrity.    Over the shift, the patient did not make progress toward the following goals. Barriers to progression include cdiff infection. Recommendations to address these barriers include cleansing.

## 2023-12-10 NOTE — INDIVIDUALIZED OVERALL PLAN OF CARE NOTE
Prealbumin returned at 3.2 severely malnourished  Will make sure clinical dietitian is on consult.    Nothing further from palliative medicine have not yet had any return calls from her spouse  Continues as a full code in the aggressive disease modifying model of care.    Please call or haiku if concerns or questions arise.  Palliative medicine available as needed.

## 2023-12-11 NOTE — PROGRESS NOTES
Ayanna Gross is a 73 y.o. female on day 5 of admission presenting with Hypotension, unspecified hypotension type.      Subjective   Patient seen and examined. Drowsy, oriented times 1-2, can tell me her name and that she is in the hospital. Denies chest pain, shortness of breath, nausea, or vomiting. No abdominal discomfort. Dialysis tech in at bedside.          Objective     Last Recorded Vitals  /80 (BP Location: Right leg, Patient Position: Lying)   Pulse 85   Temp 36 °C (96.8 °F) (Temporal)   Resp 14   Wt 73.9 kg (163 lb)   SpO2 94%   Intake/Output last 3 Shifts:    Intake/Output Summary (Last 24 hours) at 12/11/2023 0953  Last data filed at 12/11/2023 0928  Gross per 24 hour   Intake 520 ml   Output 0 ml   Net 520 ml       Admission Weight  Weight: 72.6 kg (160 lb) (12/06/23 1301)    Daily Weight  12/10/23 : 73.9 kg (163 lb)    Image Results  US thoracentesis  Narrative: Interpreted By:  Sandro Hernandez,   STUDY:  US THORACENTESIS; 12/7/2023 3:16 pm      INDICATION:  Signs/Symptoms:Left pleural effusion question of whether this needs  thoracentesis, history of ESRD, just had dialysis today.      COMPARISON:  None      ACCESSION NUMBER(S):  [id number]      ORDERING CLINICIAN:  JEZ LONG      TECHNIQUE:  Informed consent obtained. Patient positionedsitting upright. Skin  prepped, draped and anesthetized. Under ultrasound guidance, a  centesis catheter/needle was advanced into rightpleural cavity.      FINDINGS:  A total of 750 cc of clear yellow fluid was aspirated. A sample was  sent for analysis. The patient tolerated the procedure well.      Impression: Ultrasound-guided left thoracentesis.          Signed by: Sandro Hernandez 12/8/2023 3:01 PM  Dictation workstation:   [id number]  XR chest 1 view  Narrative: Interpreted By:  Maria Garcia,   STUDY:  XR CHEST 1 VIEW 12/8/2023 11:57 am      INDICATION:  Signs/Symptoms:S/P thoracentesis      COMPARISON:  12/06/2023      ACCESSION  NUMBER(S):  [id number]      ORDERING CLINICIAN:  JEZ LONG      TECHNIQUE:  AP erect view of the chest at bedside      FINDINGS:  There is an interval decrease in size of left pleural effusion when  compared with the study done 2 days earlier. No pneumothorax is seen.      There is some residual left pleural effusion with infiltrates seen in  the left mid and lower lung field. The right lung is clear.      There are surgical clips within the left axilla. Stent grafts are  visible in the left subclavian region extending into the left  axillary region. A tunneled right jugular dialysis catheter  terminates within right atrium.      Impression: Decreased size of left effusion following ultrasound-guided  thoracentesis with no pneumothorax.      There is some residual left pleural effusion noted with infiltrate in  the left mid and lower lung field.      Signed by: Maria Garcia 12/8/2023 12:51 PM  Dictation workstation:   [id number]      Physical Exam  Vitals reviewed.   Constitutional:       Comments: Poor historian   HENT:      Head: Normocephalic and atraumatic.   Eyes:      Extraocular Movements: Extraocular movements intact.      Conjunctiva/sclera: Conjunctivae normal.   Cardiovascular:      Rate and Rhythm: Normal rate and regular rhythm.   Pulmonary:      Effort: Pulmonary effort is normal.      Breath sounds: No wheezing, rhonchi or rales.      Comments: Breath sounds diminished bilaterally  Abdominal:      General: Bowel sounds are normal.      Palpations: Abdomen is soft.      Tenderness: There is no abdominal tenderness.   Skin:     General: Skin is warm and dry.      Comments: Dressing to BLE CDI   Neurological:      General: No focal deficit present.      Mental Status: She is alert. She is disoriented.         Relevant Results  Lab Results   Component Value Date    GLUCOSE 105 (H) 12/11/2023    CALCIUM 8.5 12/11/2023     12/11/2023    K 3.6 12/11/2023    CO2 27 12/11/2023      12/11/2023    BUN 13 12/11/2023    CREATININE 3.60 (H) 12/11/2023     Lab Results   Component Value Date    WBC 10.4 12/11/2023    HGB 10.9 (L) 12/11/2023    HCT 33.5 (L) 12/11/2023    MCV 87 12/11/2023     (L) 12/11/2023     US thoracentesis  Result Date: 12/8/2023  FINDINGS: A total of 750 cc of clear yellow fluid was aspirated. A sample was sent for analysis. The patient tolerated the procedure well.     Ultrasound-guided left thoracentesis.     Signed by: Sandro Hernandez 12/8/2023 3:01 PM     XR chest 1 view  Result Date: 12/8/2023  Decreased size of left effusion following ultrasound-guided thoracentesis with no pneumothorax.   There is some residual left pleural effusion noted with infiltrate in the left mid and lower lung field.   Signed by: Maria Garcia 12/8/2023 12:51 PM Dictation workstation:   [id number]    XR foot 3+ views bilateral  Result Date: 12/7/2023  1. Right foot: Large heel spur. Atherosclerotic disease. Suggestion of diffuse osteopenia. No sclerosis to suggest osteomyelitis. No subcutaneous air to suggest gangrene. Absent flanges 2nd ray with difficult to visualized distal phalanges of the other digits.   2. Left foot: Large heel spur. Degenerative changes of the hindfoot midfoot and forefoot with atherosclerotic disease. Absent distal phalanx of the great toe. No periostitis or abnormal sclerosis with diffuse osteopenia to suggest osteomyelitis. No subcutaneous air to suggest gangrene.           ECG 12 Lead  Result Date: 12/7/2023  Poor data quality, interpretation may be adversely affected Normal sinus rhythm Right bundle branch block Septal infarct , age undetermined Possible Lateral infarct , age undetermined Abnormal ECG No previous ECGs available     XR chest 1 view  New, moderate left pleural effusion.            Assessment/Plan      Principal Problem:    Hypotension, unspecified hypotension type  Active Problems:    Paroxysmal atrial fibrillation (CMS/HCC)    Peripheral vascular  disease (CMS/HCC)    Orthostatic hypotension    Mixed hyperlipidemia    End-stage renal disease on hemodialysis (CMS/HCC)    DM (diabetes mellitus) (CMS/HCC)    Absent pedal pulses    Bilateral heel wounds   Consult podiatry - appreciate recs   Consult ID -  appreciate recs   Wound care consult   Blood cultures negative to date  Bilateral boots to protect heels from pressure   Awaiting vascular studies - I left message with vascular department yesterday (12/9) asking for study to be completed as soon as able     Metabolic encephalopathy  Unsure if this is her baseline, tech says she waxes and wanes  Currently receiving dialysis, also on ATB for infection  Consider CT head if no improvement with dialysis this AM    End-stage renal disease on hemodialysis   Consult Dr. Mondragon - appreciate recs  Continue Phoslo   HD per nephrology    Hypotension/orthostatic hypotension  Improved, monitor close  Continue midodrine   Check orthostatic vitals  Continue low-dose Toprol in am   Hold Imdur   Left arm fistula and history of right sided mastectomy. BP different in upper extremities than lower extremities.   Hypotension improved with albumin    Fall precautions     Peripheral vascular disease   Awaiting vascular studies of lower extremities     Paroxysmal atrial fibrillation  Continue low-dose beta blocker   Eliquis for stroke prophylaxis     Mixed hyperlipidemia  Continue statin, fenofibrate     DM (diabetes mellitus)   Hold Tradjenta   AC/HS glucose with SSI coverage   Hypoglycemia protocol     Groin excoriation   Wound care consult   Nystatin powder   Keep skin clean and dry   Sacrum covered with Mepilex.     C-diff   PCR positive and negative toxin.   Vancomycin 125 mg 4 times daily   ID consult - appreciate recs   Loose stools improved          Malnutrition Diagnosis Status: New  Malnutrition Diagnosis: Moderate malnutrition related to acute disease or injury  As Evidenced by: 1-2% unintentional wt loss in 1 week (~4% in  2 weeks), Poor intake <75% of estimated energy requirement > 7days, and mild fat and muscle deficits  I agree with the dietitian's malnutrition diagnosis.    Plan  HD per nephrology  Follow cultures  Continue ATB  Maintain precautions  Monitor mental status, consider CT head if no improvement  CBC and BMP in AM  PT/OT  May need SNF         Inez Josue, APRN-CNP

## 2023-12-11 NOTE — NURSING NOTE
Received report from Rashmi HUDDLESTON, assumed care of patient. Patient clean, sitting up in bed eating dinner currently.

## 2023-12-11 NOTE — PROGRESS NOTES
"Nutrition Follow up Note    Nutrition Assessment      Pt reported \"ok\" intake and is drinking her supplements. Pt requested to discontinue Christian d/t not liking the taste. Pt receiving dialysis at time of visit - provided renal diet on dialysis education to patient. Expected discharge to SNF.    Nutrition History:  Food and Nutrient History: Pt reports her appetite is \"ok\" and she is drinking mighty shake supplements  Energy Intake: Fair 50-75 %    Anthropometrics:  Ht: 157.5 cm (5' 2\"), Wt: 73.9 kg (163 lb), BMI: 29.81  IBW/kg (Dietitian Calculated): 50 kg  Percent of IBW: 145 %  Adjusted Body Weight (kg): 55.7 kg    Weight Change:  Weight History / % Weight Change: Pt unsure of any weight changes, but reports her #. Possible 7# weight loss in 2 weeks.  Significant Weight Loss: Yes  Interpretation of Weight Loss:  (~4% in 2 weeks)       Nutrition Focused Physical Exam Findings: taken 12/7  Subcutaneous Fat Loss  Buccal Fat Pads: Well nourished (full, rounded cheeks)  Triceps: Mild-moderate (less than ample fat tissue)    Muscle Wasting  Temporalis: Well nourished (well-defined muscle)  Pectoralis (Clavicular Region): Well nourished (clavicle not visible)  Deltoid/Trapezius: Mild-Moderate (slight protrusion of acromion process)    Physical Findings (Nutrition Deficiency/Toxicity)  Skin:  (bilateral diabetic ulcer non-healing wounds, groin bilateral wound, and buttocks bilateral wound (12/6))    Nutrition Significant Labs:  Lab Results   Component Value Date    WBC 10.4 12/11/2023    HGB 10.9 (L) 12/11/2023    HCT 33.5 (L) 12/11/2023     (L) 12/11/2023    CHOL 104 (L) 07/01/2023    TRIG 155 (H) 07/01/2023    HDL 30 (L) 07/01/2023    ALT 21 12/07/2023    AST 40 12/07/2023     12/11/2023    K 3.6 12/11/2023     12/11/2023    CREATININE 3.60 (H) 12/11/2023    BUN 13 12/11/2023    CO2 27 12/11/2023    TSH 1.56 02/04/2020    INR 1.1 08/20/2023    HGBA1C 8.8 (H) 06/25/2023    ALBUR 1,315 (H) " 02/24/2019       Current Facility-Administered Medications:     acetaminophen (Tylenol) tablet 650 mg, 650 mg, oral, q4h PRN, LILLIE Gaona    acetaminophen (Tylenol) tablet 650 mg, 650 mg, oral, q4h PRN, LILLIE Gaona    albumin human 25 % solution 12.5 g, 12.5 g, intravenous, q1h PRN, Saeed Mondragon MD, Stopped at 12/08/23 1656    apixaban (Eliquis) tablet 2.5 mg, 2.5 mg, oral, BID, LILLIE Gaona, 2.5 mg at 12/11/23 1219    calcium acetate (Phoslo) capsule 667 mg, 667 mg, oral, TID with meals, LILLIE Gaona, 667 mg at 12/11/23 1221    collagenase 250 unit/gram ointment, , Topical, Daily, Florentin Faulkner DPM, Given at 12/10/23 1728    dextrose 10 % in water (D10W) infusion, 0.3 g/kg/hr, intravenous, Once PRN, LILLIE Gaona    dextrose 50 % injection 25 g, 25 g, intravenous, q15 min PRN, LILLIE Gaona    doxycycline (Vibramycin) capsule 100 mg, 100 mg, oral, q24h GULSHAN, Stephen Coulter MD, 100 mg at 12/11/23 1222    escitalopram (Lexapro) tablet 10 mg, 10 mg, oral, Daily, LILLIE Gaona, 10 mg at 12/11/23 1222    febuxostat (Uloric) tablet 40 mg, 40 mg, oral, Every other day, LILLIE Gaona, 40 mg at 12/11/23 1223    fenofibrate (Triglide) tablet 160 mg, 160 mg, oral, Daily, LILLIE Gaona, 160 mg at 12/11/23 1225    gabapentin (Neurontin) capsule 300 mg, 300 mg, oral, BID, LILLIE Gaona, 300 mg at 12/11/23 1225    glucagon (Glucagen) injection 1 mg, 1 mg, intramuscular, q15 min PRN, Susan Rashid, APRN-CNP    heparin 1,000 unit/mL injection 2,000 Units, 2,000 Units, intra-catheter, After Dialysis, Yvonne Madrigal MD, 1,600 Units at 12/11/23 1207    heparin 1,000 unit/mL injection 2,000 Units, 2,000 Units, intra-catheter, After Dialysis, Yvonne Madrigal MD, 1,600 Units at 12/11/23 1207    insulin lispro (HumaLOG) injection 0-5 Units, 0-5 Units,  subcutaneous, TID with meals, LILLIE Gaona    metoprolol succinate XL (Toprol-XL) 24 hr tablet 12.5 mg, 12.5 mg, oral, Daily, LILLIE Gaona, 12.5 mg at 12/10/23 0903    midodrine (Proamatine) tablet 15 mg, 15 mg, oral, TID with meals, Saeed Mondragon MD, 15 mg at 12/11/23 1234    nystatin (Mycostatin) 100,000 unit/gram powder 1 Application, 1 Application, Topical, BID, LILLIE Gaona, 1 Application at 12/11/23 1239    polyethylene glycol (Glycolax, Miralax) packet 17 g, 17 g, oral, Daily PRN, LILLIE Gaona    simvastatin (Zocor) tablet 40 mg, 40 mg, oral, Nightly, LILLIE Gaona, 40 mg at 12/10/23 2159    vancomycin (Vancocin) capsule 125 mg, 125 mg, oral, 4x daily, LILLIE Gaona, 125 mg at 12/11/23 1303    zinc oxide 20 % ointment 1 Application, 1 Application, Topical, q1h PRN, Dhiraj De La Garza MD    Dietary Orders (From admission, onward)       Start     Ordered    12/07/23 1503  Oral nutritional supplements  Until discontinued        Comments: Vanilla   Question Answer Comment   Deliver with Breakfast    Deliver with Dinner    Select supplement: Sugar Free Mighty Shake        12/07/23 1503    12/06/23 2125  Adult diet Renal; Potassium Restricted 2 gm (50mEq); 2 - 3 grams Sodium  Diet effective now        Question Answer Comment   Diet type Renal    Potassium restriction: Potassium Restricted 2 gm (50mEq)    Sodium restriction: 2 - 3 grams Sodium        12/06/23 2124                  Estimated Needs:   Estimated Energy Needs  Total Energy Estimated Needs (kCal):  (8170-1096 kcals)  Total Estimated Energy Need per Day (kCal/kg):  (30-35 kcals/kg)  Method for Estimating Needs: adjusted wt    Estimated Protein Needs  Total Protein Estimated Needs (g):  (67-84)  Total Protein Estimated Needs (g/kg):  (1.2-1.5)  Method for Estimating Needs: adjusted wt    Estimated Fluid Needs  Method for Estimating Needs: UO + 500-1000 ml       Nutrition Diagnosis   Nutrition Diagnosis:  Malnutrition Diagnosis  Patient has Malnutrition Diagnosis: Yes  Diagnosis Status: Ongoing  Malnutrition Diagnosis: Moderate malnutrition related to acute disease or injury  As Evidenced by: 1-2% unintentional wt loss in 1 week (~4% in 2 weeks), Poor intake <75% of estimated energy requirement > 7days, and mild fat and muscle deficits    Nutrition Diagnosis  Patient has Nutrition Diagnosis: Yes  Diagnosis Status (1): New  Nutrition Diagnosis 1: Increased nutrient needs  Related to (1): increased demand for nutrient  As Evidenced by (1): diabetic ulcers and wounds       Nutrition Interventions/Recommendations   Nutrition Interventions and Recommendations:    Nutrition Prescription:  Individualized Nutrition Prescription Provided for : 4966-9845 kcal and 67-84g protein provided via renal diet and supplements    Nutrition Interventions:   Food and/or Nutrient Delivery Interventions  Interventions: Meals and snacks  Meals and Snacks: Mineral-modified diet (renal diet)  Goal: provide as ordered  Medical Food Supplement: Commercial beverage  Goal: mighty shake BID to provide 200 kcals and 7 g protein per carton  Additional Interventions: provided HD diet education to pt    Education Documentation  Nutrition Care Manual, taught by Jeanette Wilks RD, LD at 12/11/2023  2:15 PM.  Learner: Patient  Readiness: Eager  Method: Explanation, Handout  Response: Verbalizes Understanding  Comment: Per patient request, provided handouts and discussed nutrition therapy for renal hemodialysis.           Nutrition Monitoring and Evaluation   Monitoring/Evaluation:   Food/Nutrient Related History Monitoring  Monitoring and Evaluation Plan: Energy intake  Energy Intake: Estimated energy intake  Criteria: Pt will tolerate >75% of estimated energy needs  Fluid Intake: Estimated fluid intake  Criteria: Pt will tolerate >75% of supplements    Body Composition/Growth/Weight History  Monitoring and  Evaluation Plan: Weight  Weight: Measured weight  Criteria: Pt will maintain wt      Nutrition Focused Physical Findings  Monitoring and Evaluation Plan: Skin  Skin: Impaired wound healing  Criteria: Pt will maintain/show signs of improvement in skin integrity       Time Spent/Follow-up:   Follow Up  Time Spent (min): 30 minutes  Last Date of Nutrition Visit: 12/07/23  Nutrition Follow-Up Needed?: 5-7 days  Follow up Comment: 12/18/23

## 2023-12-11 NOTE — CARE PLAN
Problem: Diabetes  Goal: Achieve decreasing blood glucose levels by end of shift  Outcome: Progressing  Goal: Increase stability of blood glucose readings by end of shift  Outcome: Progressing  Goal: Decrease in ketones present in urine by end of shift  Outcome: Progressing  Goal: Maintain electrolyte levels within acceptable range throughout shift  Outcome: Progressing  Goal: Maintain glucose levels >70mg/dl to <250mg/dl throughout shift  Outcome: Progressing  Goal: No changes in neurological exam by end of shift  Outcome: Progressing  Goal: Learn about and adhere to nutrition recommendations by end of shift  Outcome: Progressing  Goal: Vital signs within normal range for age by end of shift  Outcome: Progressing  Goal: Increase self care and/or family involovement by end of shift  Outcome: Progressing  Goal: Receive DSME education by end of shift  Outcome: Progressing     Problem: Pain - Adult  Goal: Verbalizes/displays adequate comfort level or baseline comfort level  Outcome: Progressing     Problem: Safety - Adult  Goal: Free from fall injury  Outcome: Progressing     Problem: Discharge Planning  Goal: Discharge to home or other facility with appropriate resources  Outcome: Progressing     Problem: Chronic Conditions and Co-morbidities  Goal: Patient's chronic conditions and co-morbidity symptoms are monitored and maintained or improved  Outcome: Progressing     Problem: Skin  Goal: Decreased wound size/increased tissue granulation at next dressing change  Outcome: Progressing  Flowsheets (Taken 12/11/2023 0434)  Decreased wound size/increased tissue granulation at next dressing change:   Promote sleep for wound healing   Utilize specialty bed per algorithm   Protective dressings over bony prominences  Goal: Participates in plan/prevention/treatment measures  Outcome: Progressing  Flowsheets (Taken 12/11/2023 0434)  Participates in plan/prevention/treatment measures:   Discuss with provider PT/OT consult    Elevate heels  Goal: Prevent/manage excess moisture  Outcome: Progressing  Flowsheets (Taken 12/11/2023 0434)  Prevent/manage excess moisture:   Cleanse incontinence/protect with barrier cream   Monitor for/manage infection if present   Follow provider orders for dressing changes  Goal: Prevent/minimize sheer/friction injuries  Outcome: Progressing  Flowsheets (Taken 12/11/2023 0434)  Prevent/minimize sheer/friction injuries:   HOB 30 degrees or less   Turn/reposition every 2 hours/use positioning/transfer devices   Utilize specialty bed per algorithm  Goal: Promote/optimize nutrition  Outcome: Progressing  Flowsheets (Taken 12/11/2023 0434)  Promote/optimize nutrition:   Assist with feeding   Offer water/supplements/favorite foods   Consume > 50% meals/supplements   Monitor/record intake including meals  Goal: Promote skin healing  Outcome: Progressing  Flowsheets (Taken 12/11/2023 0434)  Promote skin healing:   Assess skin/pad under line(s)/device(s)   Protective dressings over bony prominences   Turn/reposition every 2 hours/use positioning/transfer devices     Problem: Fall/Injury  Goal: Not fall by end of shift  Outcome: Progressing  Goal: Be free from injury by end of the shift  Outcome: Progressing  Goal: Verbalize understanding of personal risk factors for fall in the hospital  Outcome: Progressing  Goal: Verbalize understanding of risk factor reduction measures to prevent injury from fall in the home  Outcome: Progressing  Goal: Use assistive devices by end of the shift  Outcome: Progressing  Goal: Pace activities to prevent fatigue by end of the shift  Outcome: Progressing     Problem: Nutrition  Goal: Less than 5 days NPO/clear liquids  Outcome: Progressing  Goal: Oral intake greater than 50%  Outcome: Progressing  Goal: Oral intake greater 75%  Outcome: Progressing  Goal: Consume prescribed supplement  Outcome: Progressing  Goal: Adequate PO fluid intake  Outcome: Progressing  Goal: Nutrition support  goals are met within 48 hrs  Outcome: Progressing  Goal: Nutrition support is meeting 75% of nutrient needs  Outcome: Progressing  Goal: Tube feed tolerance  Outcome: Progressing  Goal: BG  mg/dL  Outcome: Progressing  Goal: Lab values WNL  Outcome: Progressing  Goal: Electrolytes WNL  Outcome: Progressing  Goal: Promote healing  Outcome: Progressing  Goal: Maintain stable weight  Outcome: Progressing  Goal: Reduce weight from edema/fluid  Outcome: Progressing  Goal: Gradual weight gain  Outcome: Progressing  Goal: Improve ostomy output  Outcome: Progressing   The patient's goals for the shift include      The clinical goals for the shift include Improved skin integrity.    Over the shift, the patient did not make progress toward the following goals. Barriers to progression include incontinence. Recommendations to address these barriers include wound care.

## 2023-12-11 NOTE — PROGRESS NOTES
Hemodialysis procedure note:  I saw her during her hemodialysis procedure.  Unfortunately, her blood pressure remains all over the place.  Very erratic readings, not certain that these are accurate at all.  Inclined to ignore them and use her mentation as a marker of perfusion.  Attempting 1 L volume removal during treatment today.  She said her diarrhea has slowed down a little bit.  Anticipate next hemodialysis will be on Wednesday.  Not sure what her endpoint is.  I will follow her.

## 2023-12-11 NOTE — CARE PLAN
Problem: Diabetes  Goal: Maintain electrolyte levels within acceptable range throughout shift  Outcome: Progressing   The patient's goals for the shift include      The clinical goals for the shift include Improved skin integrity    Over the shift, the patient did not make progress toward the following goals. Barriers to progression include   Problem: Diabetes  Goal: Maintain electrolyte levels within acceptable range throughout shift  Outcome: Progressing    Recommendations to address these barriers include   Problem: Fall/Injury  Goal: Be free from injury by end of the shift  Outcome: Progressing     Problem: Fall/Injury  Goal: Verbalize understanding of personal risk factors for fall in the hospital  Outcome: Progressing     Problem: Fall/Injury  Goal: Verbalize understanding of risk factor reduction measures to prevent injury from fall in the home  Outcome: Progressing

## 2023-12-11 NOTE — NURSING NOTE
Assumed care of pt awake alert and orientedx1-2,s arrhythmia on telemetry.Pt on fall and skin preventions,pt has been turned and repositionned q2h and prn.

## 2023-12-11 NOTE — NURSING NOTE
Stat abg drawn with oxygen @ 2 l via n/c.Inez Josue Cnp notified.Accu check done 59mg/dl,25 Gm of D50 ivp given before pt went for Ct of head,escorted by Rapid response nurse,Daughter visiting in room updated with plan of care.

## 2023-12-11 NOTE — PROGRESS NOTES
Ayanna Gross is a 73 y.o. female on day 5 of admission presenting with Hypotension, unspecified hypotension type.    Subjective   Interval History:   Afebrile, no chills  Diarrhea improving  No abdominal pain nausea vomiting        Review of Systems   All other systems reviewed and are negative.      Objective   Range of Vitals (last 24 hours)  Heart Rate:  [76-85]   Temp:  [36 °C (96.8 °F)-36.5 °C (97.7 °F)]   Resp:  [14-27]   BP: ()/(73-99)   Daily Weight  12/10/23 : 73.9 kg (163 lb)    Body mass index is 29.81 kg/m².    Physical Exam  Constitutional:       Appearance: Normal appearance.   HENT:      Head: Normocephalic and atraumatic.      Nose: Nose normal.      Mouth/Throat:      Mouth: Mucous membranes are moist.      Pharynx: Oropharynx is clear.   Eyes:      Extraocular Movements: Extraocular movements intact.      Conjunctiva/sclera: Conjunctivae normal.   Cardiovascular:      Rate and Rhythm: Normal rate and regular rhythm.   Pulmonary:      Effort: Pulmonary effort is normal.      Breath sounds: Normal breath sounds.   Abdominal:      General: Bowel sounds are normal.      Palpations: Abdomen is soft.   Musculoskeletal:         General: Normal range of motion.      Cervical back: Normal range of motion and neck supple.      Right lower leg: Edema present.      Left lower leg: Edema present.   Skin:     Comments: Groin excoriation, right heel wound and left heel wound with necrotic eschar, jeaneth wound with mild erythema-pictures reviewed  LUIS fistula        Neurological:      General: No focal deficit present.      Mental Status: She is alert and oriented to person, place, and time. Mental status is at baseline.   Psychiatric:         Mood and Affect: Mood normal.         Behavior: Behavior normal.     Antibiotics  sodium chloride 0.9 % bolus 500 mL  cefepime (Maxipime) 1 g in dextrose 5 % 50 mL IV  vancomycin-diluent combo no.1 (Xellia) IVPB 1 g  apixaban (Eliquis) tablet 2.5 mg  calcium acetate  (Phoslo) capsule 2,000 mg  escitalopram (Lexapro) tablet 10 mg  febuxostat (Uloric) tablet 40 mg  fenofibrate (Triglide) tablet 160 mg  gabapentin (Neurontin) capsule 300 mg  metoprolol succinate XL (Toprol-XL) 24 hr tablet 12.5 mg  midodrine (Proamatine) tablet 10 mg  nystatin (Mycostatin) 100,000 unit/gram powder 1 Application  B complex-vitamin C tablet 1 tablet  simvastatin (Zocor) tablet 40 mg  acetaminophen (Tylenol) tablet 650 mg  acetaminophen (Tylenol) tablet 650 mg  polyethylene glycol (Glycolax, Miralax) packet 17 g  dextrose 50 % injection 25 g  glucagon (Glucagen) injection 1 mg  dextrose 10 % in water (D10W) infusion  insulin lispro (HumaLOG) injection 0-5 Units  zinc oxide 20 % ointment 1 Application  calcium acetate (Phoslo) capsule 667 mg  vancomycin (Vancocin) capsule 125 mg  lidocaine PF (Xylocaine) 10 mg/mL (1 %) injection  heparin 1,000 unit/mL injection 2,000 Units  heparin 1,000 unit/mL injection 2,000 Units  midodrine (Proamatine) tablet 15 mg  albumin human 25 % solution 25 g  polyethylene glycol (Glycolax, Miralax) packet 17 g  collagenase 250 unit/gram ointment  vancomycin (Vancocin) 1,750 mg in dextrose 5 % in water (D5W) 250 mL IV  vancomycin-diluent combo no.1 (Xellia) IVPB 1,750 mg  albumin human 25 % solution 12.5 g  heparin 1,000 unit/mL injection 2,000 Units  heparin 1,000 unit/mL injection 2,000 Units  vancomycin (Vancocin) placeholder  doxycycline (Vibramycin) capsule 100 mg  potassium chloride CR (Klor-Con) ER tablet 10 mEq      Relevant Results  Labs  Results from last 72 hours   Lab Units 12/11/23 0456 12/10/23  0441 12/09/23  0455   WBC AUTO x10*3/uL 10.4 10.5 10.6   HEMOGLOBIN g/dL 10.9* 10.7* 10.8*   HEMATOCRIT % 33.5* 33.6* 33.0*   PLATELETS AUTO x10*3/uL 141* 120* 110*     Results from last 72 hours   Lab Units 12/11/23 0456 12/10/23  0441 12/09/23  0455   SODIUM mmol/L 138 138 139   POTASSIUM mmol/L 3.6 3.3* 3.2*   CHLORIDE mmol/L 102 100 100   CO2 mmol/L 27 25 30   BUN  mg/dL 13 9 5*   CREATININE mg/dL 3.60* 2.70* 1.70*   GLUCOSE mg/dL 105* 117* 98   CALCIUM mg/dL 8.5 8.5 8.6   ANION GAP mmol/L 9 13 9   EGFR mL/min/1.73m*2 13* 18* 32*         Estimated Creatinine Clearance: 13.5 mL/min (A) (by C-G formula based on SCr of 3.6 mg/dL (H)).  CRP   Date Value Ref Range Status   06/23/2023 19.9 (H) 0 - 2.0 MG/DL Final     Comment:     Performed at [address] [address]   05/22/2022 1.0 0 - 2.0 MG/DL Final     Comment:     Performed at [address] [address]   12/13/2020 10.1 (H) 0 - 2.0 MG/DL Final     Comment:     Performed at [address] [address]     Microbiology   reviewed  Imaging  Vascular US ankle brachial index (OCTAVIO) without exercise    Result Date: 12/11/2023  Preliminary Cardiology [place] [address]            Phone [phone]            Preliminary Vascular Lab Report  [facility] US ANKLE BRACHIAL INDEX (OCTAVIO) WITHOUT EXERCISE  Patient Name:     BASIA Gray Physician:  [id number] Lizet Messer MD Study Date:       12/11/2023     Ordering Provider:  [id number] MARIANN SUTTON MRN/PID:          [id number]       Fellow: Accession#:       [id number]   Technologist:       Luz Maria Trivedi RVT YOB: 1950       Technologist 2:     Dara Parr RVT Gender:           F              Encounter#:         [id number] Admission Status: Inpatient      Location Performed: [facility]  Diagnosis/ICD: Other specified symptoms and signs involving the circulatory and                respiratory systems-R09.89 Indication:    Peripheral vascular disease CPT Codes:     35033.52 Peripheral artery OCTAVIO Only Reduced Service  Pertinent History: Absent pedal pulses. Patient is on dialysis.  PRELIMINARY CONCLUSIONS:  Right Lower PVR: Evidence of moderate arterial occlusive disease in the right lower extremity at rest. Right pressures of >220 mmHg  suggest no compressibility of vessels and may make absolute Segmental Limb Pressures (SLP) unreliable. Decreased digital perfusion noted. Biphasic flow is noted in the right dorsalis pedis artery. Left Lower PVR: Evidence of moderate arterial occlusive disease in the left lower extremity at rest. Left pressures of >220 mmHg suggest no compressibility of vessels and may make absolute Segmental Limb Pressures (SLP) unreliable. Decreased digital perfusion noted. Biphasic flow is noted in the left dorsalis pedis artery. Additional Findings: Technically difficult and limited exam due to patient's positioning, constant movement and inability to assist with exam.  Imaging & Doppler Findings:  RIGHT Lower PVR              Pressures Right Dorsalis Pedis (Ankle) 255 mmHg   LEFT Lower PVR              Pressures Left Dorsalis Pedis (Ankle) 255 mmHg   VASCULAR PRELIMINARY REPORT completed by Luz Maria Trivedi RVT on 12/11/2023 at 3:51:11 PM  ** Final **     US thoracentesis    Result Date: 12/8/2023  Interpreted By:  Sandro Hernandez, STUDY: US THORACENTESIS; 12/7/2023 3:16 pm   INDICATION: Signs/Symptoms:Left pleural effusion question of whether this needs thoracentesis, history of ESRD, just had dialysis today.   COMPARISON: None   ACCESSION NUMBER(S): [id number]   ORDERING CLINICIAN: JEZ LONG   TECHNIQUE: Informed consent obtained. Patient positionedsitting upright. Skin prepped, draped and anesthetized. Under ultrasound guidance, a centesis catheter/needle was advanced into rightpleural cavity.   FINDINGS: A total of 750 cc of clear yellow fluid was aspirated. A sample was sent for analysis. The patient tolerated the procedure well.       Ultrasound-guided left thoracentesis.     Signed by: Sandro Hernandez 12/8/2023 3:01 PM Dictation workstation:   [id number]    XR chest 1 view    Result Date: 12/8/2023  Interpreted By:  Maria Garcia, STUDY: XR CHEST 1 VIEW 12/8/2023 11:57 am   INDICATION: Signs/Symptoms:S/P thoracentesis    COMPARISON: 12/06/2023   ACCESSION NUMBER(S): [id number]   ORDERING CLINICIAN: JEZ LONG   TECHNIQUE: AP erect view of the chest at bedside   FINDINGS: There is an interval decrease in size of left pleural effusion when compared with the study done 2 days earlier. No pneumothorax is seen.   There is some residual left pleural effusion with infiltrates seen in the left mid and lower lung field. The right lung is clear.   There are surgical clips within the left axilla. Stent grafts are visible in the left subclavian region extending into the left axillary region. A tunneled right jugular dialysis catheter terminates within right atrium.       Decreased size of left effusion following ultrasound-guided thoracentesis with no pneumothorax.   There is some residual left pleural effusion noted with infiltrate in the left mid and lower lung field.   Signed by: Maria Garcia 12/8/2023 12:51 PM Dictation workstation:   [id number]    XR foot 3+ views bilateral    Result Date: 12/7/2023  Interpreted By:  Brendon Hook, STUDY: XR FOOT 3+ VIEWS BILATERAL; 12/7/2023 3:31 pm   INDICATION: Signs/Symptoms:Wound/Abscess/Acute neha process rule out/Comparative Exam.   COMPARISON: None   ACCESSION NUMBER(S): [id number]   ORDERING CLINICIAN: JOSSELYN STAHL   TECHNIQUE: Nonweightbearing AP, lateral and oblique views of the bilateral foot were performed.   FINDINGS: Right foot: Large heel spur is seen. Mild Achilles enthesopathy demonstrated. Moderate atherosclerotic disease is seen. There is suggestion of loss of the arch on this nonweightbearing view. Degenerative change anterior plafond demonstrated and dorsal talus as well as posterior plafond and dorsal talus. Sclerosis anterior calcaneus is seen. Degenerative changes mild between the midfoot and forefoot. 1st metatarsophalangeal joint degenerative changes demonstrated. 2nd ray absent phalanges. Distal phalanges of the 3rd through 5th rays with overlap were difficult to  visualize. Distal phalanx of the great toe is difficult to visualize.   Left foot: Large heel spur is seen. Advanced atherosclerotic disease demonstrated. Degenerative change in the calcaneus and talus seen with moderate anterior and mild posterior plafond degenerative change. Mild degenerative change in the midfoot and forefoot is seen. Distal phalanx of the great toe is absent. Degenerative change of the 2nd ray phalanges and 3rd ray phalanges seen with degenerative change of the 4th and 5th digits not well seen.       1. Right foot: Large heel spur. Atherosclerotic disease. Suggestion of diffuse osteopenia. No sclerosis to suggest osteomyelitis. No subcutaneous air to suggest gangrene. Absent flanges 2nd ray with difficult to visualized distal phalanges of the other digits.   2. Left foot: Large heel spur. Degenerative changes of the hindfoot midfoot and forefoot with atherosclerotic disease. Absent distal phalanx of the great toe. No periostitis or abnormal sclerosis with diffuse osteopenia to suggest osteomyelitis. No subcutaneous air to suggest gangrene.       Signed by: Brendon Hook 12/7/2023 8:39 PM Dictation workstation:   [id number]    ECG 12 Lead    Result Date: 12/7/2023   Poor data quality, interpretation may be adversely affected Normal sinus rhythm Right bundle branch block Septal infarct , age undetermined Possible Lateral infarct , age undetermined Abnormal ECG No previous ECGs available Confirmed by Hammad Callahan ([id number]) on 12/7/2023 1:07:11 PM    XR chest 1 view    Result Date: 12/6/2023  Interpreted By:  Roger Murdock, STUDY: XR CHEST 1 VIEW;  12/6/2023 2:08 pm   INDICATION: Signs/Symptoms:Brief AMS and hypotension, now resolved.   COMPARISON: 08/21/2023..   ACCESSION NUMBER(S): [id number]   ORDERING CLINICIAN: PATEL TINSLEY   FINDINGS: New moderate left pleural effusion. There is silhouetting of the left heart border. Left subclavian stent present. Right-sided double-lumen central venous  catheter terminates within the right ventricle. No acute osseous findings.       New, moderate left pleural effusion.     MACRO: None   Signed by: Roger Murdock 12/6/2023 2:18 PM Dictation workstation:   [id number]    Assessment/Plan   Unstageable Bilateral heel ulcers  C-difficile infection-resolving  Type 2 diabetes mellitus with peripheral angiopathy with gangrene  Pleural effusion, s/p thoracentesis  History of chronic MRSA bacteremia, infective endocarditis-completed IV vancomycin, now on Doxycycline   Hypotension  ESRD oh hemodialysis   severe malnutrition-prealbumin of 3.2            discontinue IV vancomycin   Oral vancomycin-treatment for C. difficile-total of 14 days  Oral doxycycline as suppressive therapy for MRSA bacteremia with possible infective endocarditis  Monitor temperature WBC  Local care  Offloading   high-protein diet  Monitor stool  Podiatry follow up  Contact precautions      Stephen Coulter MD

## 2023-12-11 NOTE — PROGRESS NOTES
Ayanna Gross is a 73 y.o. female on day 5 of admission presenting with Hypotension, unspecified hypotension type.    Patient off unit to dialysis, per RN in rounds patient is intermediately confused [facility] will reach out to  regarding SNF placement     1530: [facility] attempted to speak with pt, pt states she is going to rehab and then started repeating herself over and over and not answering questions.     1600: [facility] spoke to pts spouse danni who is agreeable to pt going to rehab and would [facility] discharged from [facility] to send referral    Irene Patterson RN

## 2023-12-11 NOTE — PROGRESS NOTES
Vancomycin Dosing by Pharmacy- Cessation of Therapy    Consult to pharmacy for vancomycin dosing has been discontinued by the prescriber, pharmacy will sign off at this time.    Please call pharmacy if there are further questions or re-enter a consult if vancomycin is resumed.     Omar Mason, PharmD

## 2023-12-12 NOTE — PROGRESS NOTES
Occupational Therapy    OT Treatment    Patient Name: Ayanna Gross  MRN: [id number]  Today's Date: 12/12/2023  Time Calculation  Start Time: 1344  Stop Time: 1422  Time Calculation (min): 38 min         Assessment:  OT Assessment: Gradual progress made towards OT goals. Continue with current OT POC to maximize safety and independence during ADLs. D/t observed confusion pt may benefit from further cognitive testing for additional discharge recommendations.  Evaluation/Treatment Tolerance: Patient limited by pain, Patient limited by fatigue  End of Session Communication: Bedside nurse  End of Session Patient Position: Bed, 3 rail up, Alarm on  OT Assessment Results: Decreased ADL status, Decreased upper extremity strength, Decreased safe judgment during ADL, Decreased endurance, Decreased functional mobility, Decreased gross motor control  Evaluation/Treatment Tolerance: Patient limited by pain, Patient limited by fatigue  Plan:  Treatment Interventions: ADL retraining, Functional transfer training, UE strengthening/ROM, Endurance training, Patient/family training, Compensatory technique education  OT Frequency: 3 times per week  OT Discharge Recommendations: Moderate intensity level of continued care  OT Recommended Transfer Status: Dependent  OT - OK to Discharge: Yes  Treatment Interventions: ADL retraining, Functional transfer training, UE strengthening/ROM, Endurance training, Patient/family training, Compensatory technique education    Subjective   Previous Visit Info:  OT Last Visit  OT Received On: 12/12/23  General:  General  Reason for Referral: decline in ADLs  Past Medical History Relevant to Rehab: ESRD on HD, PAF, PVD, HLD, DM, pnuemonia, skin CA, Breast CA, gout, depression, CVA, multiple foot wounds  Family/Caregiver Present: Yes  Caregiver Feedback: daughter present  Co-Treatment: PT  Co-Treatment Reason: necessary for 2 skilled persons due to heavy assist and patient/caregiver safety.  Prior to  Session Communication: Bedside nurse  Patient Position Received: Bed, 3 rail up, Alarm off, not on at start of session  General Comment: Pt cleared for OT session per nursing, cooperative throughout session however increased time required for task completion d/t pt poor skin integrity.  Precautions:  LE Weight Bearing Status:  (pt BLE NWB however limited WB for transfers per podiatry)  Medical Precautions: Fall precautions, Infection precautions (+Cdiff)  Vital Signs:     Pain:  Pain Assessment  Pain Assessment: FLACC (Face, Legs, Activity, Cry, Consolability)  Pain Score: 8  Pain Type: Acute pain  Pain Location: Perineum (increased pain with hygiene completion)  Pain Interventions: Repositioned    Objective    Cognition:  Cognition  Overall Cognitive Status: Impaired  Cognition Comments: pt is A&O x4 however intermittent confusion observed in conversation  Insight: Moderate  Coordination:  Movements are Fluid and Coordinated: No  Activities of Daily Living: UE Dressing  UE Dressing Comments: modA required to don/[facility] gown in supported sitting position in bed- increased time required to complete    Toileting  Toileting Comments: pt incontinent upon arrival requiring assistance for posterior hygiene tasks x2. MaxA required for hygiene this date with use of bed rails and draw sheet. MaxAx2 required for task completion d/t poor strength and functional tolerance. RN in to assess skin and apply barrier cream, bedding changed at this time.  Functional Standing Tolerance:     Bed Mobility/Transfers: Bed Mobility  Bed Mobility: Yes  Bed Mobility 1  Bed Mobility 1: Rolling right, Rolling left  Level of Assistance 1: Moderate assistance (x2)  Bed Mobility Comments 1: verbal/tactile cues required for sequencing and to maintain in side lying for hygiene tasks  Bed Mobility 2  Bed Mobility  2: Scooting  Level of Assistance 2: Dependent (x2)  Bed Mobility Comments 2: with use of glide sheet    Transfers  Transfer:  No      Outcome Measures:[facility] Daily Activity  Putting on and taking off regular lower body clothing: Total  Bathing (including washing, rinsing, drying): Total  Putting on and taking off regular upper body clothing: Total  Toileting, which includes using toilet, bedpan or urinal: Total  Taking care of personal grooming such as brushing teeth: A little  Eating Meals: A little  Daily Activity - Total Score: 10        Education Documentation  Handouts, taught by ANDERSON Kirby at 12/12/2023  6:51 PM.  Learner: Patient  Readiness: Acceptance  Method: Explanation, Demonstration  Response: Needs Reinforcement    Body Mechanics, taught by ANDERSON Kirby at 12/12/2023  6:51 PM.  Learner: Patient  Readiness: Acceptance  Method: Explanation, Demonstration  Response: Needs Reinforcement    Precautions, taught by ANDERSON Kirby at 12/12/2023  6:51 PM.  Learner: Patient  Readiness: Acceptance  Method: Explanation, Demonstration  Response: Needs Reinforcement    Home Exercise Program, taught by ANDERSON Kirby at 12/12/2023  6:51 PM.  Learner: Patient  Readiness: Acceptance  Method: Explanation, Demonstration  Response: Needs Reinforcement    ADL Training, taught by ANDERSON Kirby at 12/12/2023  6:51 PM.  Learner: Patient  Readiness: Acceptance  Method: Explanation, Demonstration  Response: Needs Reinforcement    Education Comments  Education reviewed on role of OT/ POC, importance of OT to maximize safety and independence during functional tasks and safety awareness throughout functional tasks/ transfers. Questions, comments and concerns addressed regarding OT.      Goals:  Encounter Problems       Encounter Problems (Active)       Balance       STG - Maintains static sitting balance with B upper extremity support on the edge of the bed x 15 minutes  (Not Progressing)       Start:  12/07/23    Expected End:  01/14/24       INTERVENTIONS:  1. Practice sitting on the edge of a bed/mat with  minimal support.  2. Educate patient about pressure relief.              OT Goals       ADLs (Progressing)       Start:  12/07/23    Expected End:  12/11/23       Patient will complete ADL tasks with Mod I, using AE as needed, in order to increase patient's safety and independence with self-care tasks.         Functional Transfers (Progressing)       Start:  12/07/23    Expected End:  12/11/23       Patient will complete functional transfers with Close Supervision in order to increase safety and independence with daily tasks.         B UE Strengthening (Progressing)       Start:  12/07/23    Expected End:  12/11/23       Patient will increase B UE strength to 4/5 for functional transfers.         Standing Tolerance (Progressing)       Start:  12/07/23    Expected End:  12/11/23       Patient will demonstrate the ability to stand at least >/= 1 minutes with Fair+ balance in order to increase patient's safety and independence with functional transfers.            Transfers       STG - Patient to transfer to and from sit to supine with mod A via log rolling for skin protection. (Not Progressing)       Start:  12/07/23    Expected End:  01/14/24            STG - Patient will perform bed mobility with min A to maintain pressure relief and prevent further skin integrity issues. (Progressing)       Start:  12/07/23    Expected End:  01/14/24            STG - Patient will roll from side to side using the bed rail to assist with hygiene and pressure relief with min A. (Progressing)       Start:  12/07/23    Expected End:  01/14/24

## 2023-12-12 NOTE — CONSULTS
Reason For Consult  Bilateral lower extremity heel wounds    History Of Present Illness  Ayanna Gross is a 73 y.o. female presenting with bilateral heel wounds.  Patient states the wounds been present for approximately 1 year.  She has been seeing Dr. Janusz Farris (podiatry) for wound care.  She has had multiple hospitalizations over the past 6 months and was recently discharged to a facility but then got released to her home.  Patient has been unable to care for herself and her  is very weak and also unable to care for her in a meaningful way.  Unclear what their wound care regimen has been.  Patient is nonambulatory and does not claudicate.  She does not have any ischemic rest pain.  She had very limited arterial studies secondary to her inability to cooperate.  Patient was noted to have moderate arterial disease bilaterally.  She has seen Dr. Watson in the past for her vascular issues including her left upper extremity AV fistula.     Past Medical History  She has a past medical history of Asthma, CAD (coronary artery disease), CHF (congestive heart failure) (CMS/[facility]), Chronic kidney disease on chronic dialysis (CMS/[facility]), Hypertension, Personal history of diseases of the blood and blood-forming organs and certain disorders involving the immune mechanism, Sleep apnea, and Type 2 diabetes mellitus without complications (CMS/[facility]) (09/15/2022).    Surgical History  She has a past surgical history that includes Other surgical history (11/18/2013); Carpal tunnel release (11/18/2013); Umbilical hernia repair (11/18/2013); Tonsillectomy (11/18/2013); Hand surgery (11/18/2013); Other surgical history (11/18/2013); Other surgical history (11/18/2013); Other surgical history (04/04/2022); Other surgical history (04/04/2022); Other surgical history (04/04/2022); Mastectomy, partial (04/10/2014); [place] lymph node biopsy (04/10/2014); US guided percutaneous placement (6/29/2023); and MR angio head wo IV contrast  "(8/29/2023).     Social History  She reports that she has never smoked. She has never used smokeless tobacco. She reports that she does not currently use alcohol. She reports that she does not use drugs.    Family History  Family History   Problem Relation Name Age of Onset    Lymphoma Mother      Prostate cancer Father          passed away fabiana min 2017        Allergies  Patient has no known allergies.    Review of Systems  Unable to complete due to patient's mental status, confused     Physical Exam  General: Pleasant, conversive.  Confused at times.  HEENT: Head is atraumatic, normocephalic. PERRL. No cervical bruits  Cardiac: Normal S1-S2.  Regular rate and rhythm.  No murmurs.  Respiratory: Lungs clear to auscultation.  No adventitious sounds.  Abdomen: Soft, nondistended, nontender.  Bowel sounds x4 quadrants.  Pulse exam: Nonpalpable popliteal and pedal pulses bilaterally.  Extremities: Necrotic heel ulcerations bilaterally.  Minimal surrounding tissue erythema.  Patient has a left upper extremity AV fistula.  Neuro: Moves all extremities spontaneously.  No focal deficits.  Psych: Appropriate affect.  Answers questions appropriately.     Last Recorded Vitals  Blood pressure 98/69, pulse 79, temperature 36.6 °C (97.9 °F), temperature source Oral, resp. rate 20, height 1.575 m (5' 2\"), weight 73.9 kg (163 lb), SpO2 97 %.    Relevant Results  Results for orders placed or performed during the hospital encounter of 12/06/23 (from the past 24 hour(s))   Blood Gas Arterial Full Panel   Result Value Ref Range    POCT pH, Arterial 7.49 (H) 7.38 - 7.42 pH    POCT pCO2, Arterial 40 38 - 42 mm Hg    POCT pO2, Arterial 83 (L) 85 - 95 mm Hg    POCT SO2, Arterial 99 94 - 100 %    POCT Oxy Hemoglobin, Arterial 96.2 94.0 - 98.0 %    POCT Hematocrit Calculated, Arterial 33.0 (L) 36.0 - 46.0 %    POCT Sodium, Arterial 133 (L) 136 - 145 mmol/L    POCT Potassium, Arterial 3.6 3.5 - 5.3 mmol/L    POCT Chloride, Arterial 100 " 98 - 107 mmol/L    POCT Ionized Calcium, Arterial 1.08 (L) 1.10 - 1.33 mmol/L    POCT Glucose, Arterial 80 74 - 99 mg/dL    POCT Lactate, Arterial 1.8 0.4 - 2.0 mmol/L    POCT Base Excess, Arterial 6.6 (H) -2.0 - 3.0 mmol/L    POCT HCO3 Calculated, Arterial 30.5 (H) 22.0 - 26.0 mmol/L    POCT Hemoglobin, Arterial 10.9 (L) 12.0 - 16.0 g/dL    POCT Anion Gap, Arterial 6 (L) 10 - 25 mmo/L    Patient Temperature 37.0 degrees Celsius    FiO2 28 %    Apparatus CANNULA     Site of Arterial Puncture Radial Right     Paul's Test Positive    POCT GLUCOSE   Result Value Ref Range    POCT Glucose 59 (L) 74 - 99 mg/dL   POCT GLUCOSE   Result Value Ref Range    POCT Glucose 145 (H) 74 - 99 mg/dL   POCT GLUCOSE   Result Value Ref Range    POCT Glucose 109 (H) 74 - 99 mg/dL   POCT GLUCOSE   Result Value Ref Range    POCT Glucose 120 (H) 74 - 99 mg/dL   Vancomycin   Result Value Ref Range    Vancomycin 22.8 (H) 10.0 - 20.0 ug/mL   Basic metabolic panel   Result Value Ref Range    Glucose 101 (H) 65 - 99 mg/dL    Sodium 136 133 - 145 mmol/L    Potassium 3.3 (L) 3.4 - 5.1 mmol/L    Chloride 101 97 - 107 mmol/L    Bicarbonate 27 24 - 31 mmol/L    Urea Nitrogen 10 8 - 25 mg/dL    Creatinine 2.60 (H) 0.40 - 1.60 mg/dL    eGFR 19 (L) >60 mL/min/1.73m*2    Calcium 8.3 (L) 8.5 - 10.4 mg/dL    Anion Gap 8 <=19 mmol/L   CBC   Result Value Ref Range    WBC 9.5 4.4 - 11.3 x10*3/uL    nRBC 0.0 0.0 - 0.0 /100 WBCs    RBC 3.37 (L) 4.00 - 5.20 x10*6/uL    Hemoglobin 9.5 (L) 12.0 - 16.0 g/dL    Hematocrit 29.3 (L) 36.0 - 46.0 %    MCV 87 80 - 100 fL    MCH 28.2 26.0 - 34.0 pg    MCHC 32.4 32.0 - 36.0 g/dL    RDW 21.2 (H) 11.5 - 14.5 %    Platelets 100 (L) 150 - 450 x10*3/uL   POCT GLUCOSE   Result Value Ref Range    POCT Glucose 95 74 - 99 mg/dL   POCT GLUCOSE   Result Value Ref Range    POCT Glucose 93 74 - 99 mg/dL   POCT GLUCOSE   Result Value Ref Range    POCT Glucose 96 74 - 99 mg/dL         Assessment/Plan   Peripheral arterial  "disease  Necrotic heel ulcerations bilaterally    Peripheral arterial disease-patient had a limited arterial study due to her inability to cooperate.  She likely does have peripheral arterial disease based upon my physical examination and review of the arterial study.  She has had chronic ulcerations x 1 year to bilateral heels.  She sees podiatry for wound care.  These have likely been exacerbated by the fact that she was discharged home from her rehab facility and has been unable to care for herself.  Her  is also unable to care for her in any meaningful way.  According to her daughter, she was just \"laying there\" at home prior to this hospitalization.  The underlying etiology of these ulcerations is likely pressure.  Do not believe patient is a surgical candidate currently due to her overall clinical picture.  Would recommend palliative follow-up to help determine goals of care.  If patient wants to pursue aggressive measures, she may require an angiogram on an outpatient basis once her clinical status improves.    I spent 45 minutes in the professional and overall care of this patient.      Abelino Lewis, APRN-CNP    "

## 2023-12-12 NOTE — CARE PLAN
Problem: Skin  Goal: Decreased wound size/increased tissue granulation at next dressing change  Flowsheets (Taken 12/12/2023 0013 by Asuncion Javier RN)  Decreased wound size/increased tissue granulation at next dressing change: Protective dressings over bony prominences  Goal: Participates in plan/prevention/treatment measures  Flowsheets (Taken 12/12/2023 0013 by Asuncion Javier RN)  Participates in plan/prevention/treatment measures: Elevate heels  Goal: Prevent/manage excess moisture  Flowsheets (Taken 12/12/2023 1758)  Prevent/manage excess moisture: Cleanse incontinence/protect with barrier cream  Goal: Prevent/minimize sheer/friction injuries  Flowsheets (Taken 12/12/2023 1758)  Prevent/minimize sheer/friction injuries: Complete micro-shifts as needed if patient unable. Adjust patient position to relieve pressure points, not a full turn  Goal: Promote/optimize nutrition  Flowsheets (Taken 12/12/2023 1758)  Promote/optimize nutrition: Monitor/record intake including meals  Goal: Promote skin healing  Flowsheets (Taken 12/12/2023 1758)  Promote skin healing: Assess skin/pad under line(s)/device(s)

## 2023-12-12 NOTE — NURSING NOTE
2100 noticed patient blood pressure was 66/52 from 1941 b, I was not notified about low BP by [facility]. Rechecked BP at 2100 BP was 91/31 .     2127Called on call hospuitalist. Dr. Coulter stated to recheck bp in 2 hours . No new orders given. Plan of care ongoing    2329 Rechecked /49 , Notified Dr. Coulter

## 2023-12-12 NOTE — CARE PLAN
Problem: Safety - Adult  Goal: Free from fall injury  Outcome: Progressing     Problem: Skin  Goal: Decreased wound size/increased tissue granulation at next dressing change  Outcome: Progressing  Flowsheets (Taken 12/12/2023 0013)  Decreased wound size/increased tissue granulation at next dressing change: Protective dressings over bony prominences  Goal: Participates in plan/prevention/treatment measures  Outcome: Progressing  Flowsheets (Taken 12/12/2023 0013)  Participates in plan/prevention/treatment measures: Elevate heels  Goal: Prevent/manage excess moisture  Outcome: Progressing  Flowsheets (Taken 12/12/2023 0013)  Prevent/manage excess moisture: Moisturize dry skin  Goal: Prevent/minimize sheer/friction injuries  Outcome: Progressing  Flowsheets (Taken 12/12/2023 0013)  Prevent/minimize sheer/friction injuries: HOB 30 degrees or less  Goal: Promote/optimize nutrition  Outcome: Progressing  Flowsheets (Taken 12/12/2023 0013)  Promote/optimize nutrition: Consume > 50% meals/supplements  Goal: Promote skin healing  Outcome: Progressing  Flowsheets (Taken 12/12/2023 0013)  Promote skin healing: Turn/reposition every 2 hours/use positioning/transfer devices   The patient's goals for the shift include      The clinical goals for the shift include Improved skin integrity    Over the shift, the patient did make progress toward the following goals.

## 2023-12-12 NOTE — PROGRESS NOTES
Ayanna Gross is a 73 y.o. female on day 6 of admission presenting with Hypotension, unspecified hypotension type.      Subjective  Patient sleeping but arousable on bedside exam  On 2 L of oxygen  Denies chest pain, shortness of breath nausea or vomiting  Alert with confusion  Endorses fatigue    Objective    Last Recorded Vitals      12/11/2023    12:12 PM 12/11/2023     1:00 PM 12/11/2023     7:41 PM 12/11/2023     9:00 PM 12/11/2023    11:29 PM 12/12/2023     3:22 AM 12/12/2023     7:25 AM   Vitals   Systolic  136 66 91 116 147 140   Diastolic  103 52 30 49 58 97   Heart Rate 94 105 89   88 82   Temp 35.9 °C (96.6 °F) 36.1 °C (97 °F) 36.5 °C (97.7 °F)  35.8 °C (96.4 °F) 36.2 °C (97.2 °F) 36.2 °C (97.2 °F)   Resp  17 18   21 16       Imaging  CT head wo IV contrast    Result Date: 12/11/2023  Interpreted By:  Brendon Hook, STUDY: CT HEAD WO IV CONTRAST; 12/11/2023 5:20 pm   INDICATION: Signs/Symptoms:acute confusion.   COMPARISON: 20 August 2023   ACCESSION NUMBER(S): [id number]   ORDERING CLINICIAN: RODRI SURESH   TECHNIQUE: CT was performed with one or more of the following dose reduction techniques: automated exposure control, adjustment of the mA and/or kV according to patient size, or use of iterative reconstruction technique.       PROCEDURE: 3.0 mm axial images were obtained through the brain, to include the posterior fossa without intravenous contrast enhancement.   FINDINGS: The patient's head is turned to the left and slightly canted in the gantry. Mild motion artifact is seen. There is symmetric volume loss of the cerebral hemispheres. Moderate  decreased attenuation in the bilateral periventricular white matter, consistent with microangiopathy is noted.  There is no encephalomalacic change. Brainstem is unremarkable. Cerebellar hemispheres are symmetric. No subarachnoid, intraparenchymal, subdural or interventricular hemorrhage. No intra- or extra-axial mass or abnormal blood products are  demonstrated. Hyperostosis frontalis interna is seen. Slight hypoplasia of the left mastoid.   The paranasal sinuses and mastoids as well as calvarium are unremarkable.       1. No acute intracranial findings. 2. Symmetric volume loss of the cerebral hemispheres. 3. Periventricular Microangiopathy. 4. No posttraumatic abnormality.   This report has been produced using speech recognition. This exam is available in DICOM format to non-affiliated healthcare facilities on a secure media free searchable basis with prior patient authorization. The patient exposure is reported to a radiation dose index registry. All CT examinations are performed with one or more of the following dose reduction techniques: Automated Exposure Control, Adjustment of mA and/or KV according to patient size, or use of iterative reconstruction techniques.   MACRO: None   Signed by: Brendon Hook 12/11/2023 7:05 PM Dictation workstation:   [id number]    Vascular US ankle brachial index (OCTAVIO) without exercise    Result Date: 12/11/2023  Preliminary [place] [address]            Phone [phone]            Preliminary Vascular Lab Report  [facility] US ANKLE BRACHIAL INDEX (OCTAVIO) WITHOUT EXERCISE  Patient Name:     BASIA Gray Physician:  [id number] Lizet Messer MD Study Date:       12/11/2023     Ordering Provider:  [id number] MARIANN SUTTON MRN/PID:          [id number]       Fellow: Accession#:       [id number]   Technologist:       Luz Maria Trivedi RVT YOB: 1950       Technologist 2:     Dara Parr RVT Gender:           F              Encounter#:         [id number] Admission Status: Inpatient      Location Performed: [facility]  Diagnosis/ICD: Other specified symptoms and signs involving the circulatory and                respiratory systems-R09.89 Indication:    Peripheral vascular disease CPT Codes:     50195.52 Peripheral artery OCTAVIO Only Reduced  Service  Pertinent History: Absent pedal pulses. Patient is on dialysis.  PRELIMINARY CONCLUSIONS:  Right Lower PVR: Evidence of moderate arterial occlusive disease in the right lower extremity at rest. Right pressures of >220 mmHg suggest no compressibility of vessels and may make absolute Segmental Limb Pressures (SLP) unreliable. Decreased digital perfusion noted. Biphasic flow is noted in the right dorsalis pedis artery. Left Lower PVR: Evidence of moderate arterial occlusive disease in the left lower extremity at rest. Left pressures of >220 mmHg suggest no compressibility of vessels and may make absolute Segmental Limb Pressures (SLP) unreliable. Decreased digital perfusion noted. Biphasic flow is noted in the left dorsalis pedis artery. Additional Findings: Technically difficult and limited exam due to patient's positioning, constant movement and inability to assist with exam.  Imaging & Doppler Findings:  RIGHT Lower PVR              Pressures Right Dorsalis Pedis (Ankle) 255 mmHg   LEFT Lower PVR              Pressures Left Dorsalis Pedis (Ankle) 255 mmHg   VASCULAR PRELIMINARY REPORT completed by Luz Maria Trivedi RVT on 12/11/2023 at 3:51:11 PM  ** Final **         Relevant Results  Results for orders placed or performed during the hospital encounter of 12/06/23 (from the past 24 hour(s))   POCT GLUCOSE   Result Value Ref Range    POCT Glucose 90 74 - 99 mg/dL   Blood Gas Arterial Full Panel   Result Value Ref Range    POCT pH, Arterial 7.49 (H) 7.38 - 7.42 pH    POCT pCO2, Arterial 40 38 - 42 mm Hg    POCT pO2, Arterial 83 (L) 85 - 95 mm Hg    POCT SO2, Arterial 99 94 - 100 %    POCT Oxy Hemoglobin, Arterial 96.2 94.0 - 98.0 %    POCT Hematocrit Calculated, Arterial 33.0 (L) 36.0 - 46.0 %    POCT Sodium, Arterial 133 (L) 136 - 145 mmol/L    POCT Potassium, Arterial 3.6 3.5 - 5.3 mmol/L    POCT Chloride, Arterial 100 98 - 107 mmol/L    POCT Ionized Calcium, Arterial 1.08 (L) 1.10 - 1.33 mmol/L    POCT Glucose,  Arterial 80 74 - 99 mg/dL    POCT Lactate, Arterial 1.8 0.4 - 2.0 mmol/L    POCT Base Excess, Arterial 6.6 (H) -2.0 - 3.0 mmol/L    POCT HCO3 Calculated, Arterial 30.5 (H) 22.0 - 26.0 mmol/L    POCT Hemoglobin, Arterial 10.9 (L) 12.0 - 16.0 g/dL    POCT Anion Gap, Arterial 6 (L) 10 - 25 mmo/L    Patient Temperature 37.0 degrees Celsius    FiO2 28 %    Apparatus CANNULA     Site of Arterial Puncture Radial Right     Paul's Test Positive    POCT GLUCOSE   Result Value Ref Range    POCT Glucose 59 (L) 74 - 99 mg/dL   POCT GLUCOSE   Result Value Ref Range    POCT Glucose 145 (H) 74 - 99 mg/dL   POCT GLUCOSE   Result Value Ref Range    POCT Glucose 109 (H) 74 - 99 mg/dL   POCT GLUCOSE   Result Value Ref Range    POCT Glucose 120 (H) 74 - 99 mg/dL   Vancomycin   Result Value Ref Range    Vancomycin 22.8 (H) 10.0 - 20.0 ug/mL   Basic metabolic panel   Result Value Ref Range    Glucose 101 (H) 65 - 99 mg/dL    Sodium 136 133 - 145 mmol/L    Potassium 3.3 (L) 3.4 - 5.1 mmol/L    Chloride 101 97 - 107 mmol/L    Bicarbonate 27 24 - 31 mmol/L    Urea Nitrogen 10 8 - 25 mg/dL    Creatinine 2.60 (H) 0.40 - 1.60 mg/dL    eGFR 19 (L) >60 mL/min/1.73m*2    Calcium 8.3 (L) 8.5 - 10.4 mg/dL    Anion Gap 8 <=19 mmol/L   CBC   Result Value Ref Range    WBC 9.5 4.4 - 11.3 x10*3/uL    nRBC 0.0 0.0 - 0.0 /100 WBCs    RBC 3.37 (L) 4.00 - 5.20 x10*6/uL    Hemoglobin 9.5 (L) 12.0 - 16.0 g/dL    Hematocrit 29.3 (L) 36.0 - 46.0 %    MCV 87 80 - 100 fL    MCH 28.2 26.0 - 34.0 pg    MCHC 32.4 32.0 - 36.0 g/dL    RDW 21.2 (H) 11.5 - 14.5 %    Platelets 100 (L) 150 - 450 x10*3/uL   POCT GLUCOSE   Result Value Ref Range    POCT Glucose 95 74 - 99 mg/dL   POCT GLUCOSE   Result Value Ref Range    POCT Glucose 93 74 - 99 mg/dL       No results found for the last 90 days.        Physical Exam  Constitutional:       Appearance: Normal appearance.  Alert but confused  HENT:      Head: Normocephalic and atraumatic.      Nose: Nose normal.       Mouth/Throat:      Mouth: Mucous membranes are moist.      Pharynx: Oropharynx is clear.   Eyes:      Extraocular Movements: Extraocular movements intact.      Conjunctiva/sclera: Conjunctivae normal.   Cardiovascular:      Rate and Rhythm: Normal rate and regular rhythm.   Pulmonary:      Effort: Pulmonary effort is normal.      Breath sounds: Normal breath sounds.   Abdominal:      General: Bowel sounds are normal.      Palpations: Abdomen is soft.   Musculoskeletal:         General: Normal range of motion.      Cervical back: Normal range of motion and neck supple.      Right lower leg: Edema present.      Left lower leg: Edema present.   Skin:     Comments: Groin excoriation, right heel wound and left heel wound with necrotic eschar, jeaneth wound with mild erythema-pictures reviewed  LUIS fistula      Assessment/Plan   Unstageable Bilateral heel ulcers  C-difficile infection-resolving  Type 2 diabetes mellitus with peripheral angiopathy with gangrene  Pleural effusion, s/p thoracentesis  History of chronic MRSA bacteremia, infective endocarditis-completed IV vancomycin, now on Doxycycline   Hypotension  ESRD oh hemodialysis  Severe malnutrition-prealbumin of 3.2           Continue Oral vancomycin-treatment for C. difficile-total of 14 days  Oral doxycycline as suppressive therapy for MRSA bacteremia with possible infective endocarditis  Monitor temperature WBC  Local care  Offloading  High-protein diet  Monitor stool  Podiatry follow up  Contact precautions

## 2023-12-12 NOTE — PROGRESS NOTES
CONSULT PROGRESS NOTES    SERVICE DATE: 12/12/2023   SERVICE TIME: 10:41 AM    CONSULTING SERVICE: Nephrology    ASSESSMENT AND PLAN   73-year-old with numerous medical problems including end-stage renal disease admitted for hypotension.  1.  End-stage renal disease  2.  Hypotension  3.  Fluid overload  4.  Anemia of chronic renal disease     I am obliging a thrice weekly hemodialysis schedule, next dialysis anticipated tomorrow.  I am inclined to ignore the hypotension, which I do not believe.  She seems to be mentating around her baseline.  Since being hospitalized, she has underwent a thoracentesis, been diagnosed with C. difficile colitis, and is on oral vancomycin.  I think we should consider disposition on her fairly soon.  Continue midodrine, continue low temperature dialysate, attempt 1.5 L volume removal tomorrow.  No growth as yet on blood cultures.      SUBJECTIVE  INTERVAL HPI: She tolerated dialysis yesterday, but is slightly confused about what day it is today.  She has improvement in diarrhea.  She has no pain.  Blood pressure continues to be erratic.    MEDICATIONS:  apixaban, 2.5 mg, oral, BID  calcium acetate, 667 mg, oral, TID with meals  collagenase, , Topical, Daily  doxycycline, 100 mg, oral, q24h GULSHAN  escitalopram, 10 mg, oral, Daily  febuxostat, 40 mg, oral, Every other day  fenofibrate, 160 mg, oral, Daily  gabapentin, 100 mg, oral, BID  heparin, 2,000 Units, intra-catheter, After Dialysis  heparin, 2,000 Units, intra-catheter, After Dialysis  insulin lispro, 0-5 Units, subcutaneous, TID with meals  metoprolol succinate XL, 12.5 mg, oral, Daily  midodrine, 15 mg, oral, TID with meals  nystatin, 1 Application, Topical, BID  simvastatin, 40 mg, oral, Nightly  vancomycin, 125 mg, oral, 4x daily           PRN medications: acetaminophen, acetaminophen, albumin human, dextrose 10 % in water (D10W), dextrose, glucagon, polyethylene glycol, zinc oxide     OBJECTIVE  PHYSICAL EXAM:   Heart Rate:   []   Temp:  [35.8 °C (96.4 °F)-36.5 °C (97.7 °F)]   Resp:  [16-21]   BP: ()/()   SpO2:  [93 %-97 %]   Body mass index is 29.81 kg/m².  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    DATA:   Labs:  Results for orders placed or performed during the hospital encounter of 12/06/23 (from the past 96 hour(s))   POCT GLUCOSE   Result Value Ref Range    POCT Glucose 84 74 - 99 mg/dL   Basic metabolic panel   Result Value Ref Range    Glucose 106 (H) 65 - 99 mg/dL    Sodium 136 133 - 145 mmol/L    Potassium 3.9 3.4 - 5.1 mmol/L    Chloride 99 97 - 107 mmol/L    Bicarbonate 28 24 - 31 mmol/L    Urea Nitrogen 12 8 - 25 mg/dL    Creatinine 3.30 (H) 0.40 - 1.60 mg/dL    eGFR 14 (L) >60 mL/min/1.73m*2    Calcium 8.7 8.5 - 10.4 mg/dL    Anion Gap 9 <=19 mmol/L   CBC   Result Value Ref Range    WBC 11.3 4.4 - 11.3 x10*3/uL    nRBC 0.0 0.0 - 0.0 /100 WBCs    RBC 3.78 (L) 4.00 - 5.20 x10*6/uL    Hemoglobin 10.7 (L) 12.0 - 16.0 g/dL    Hematocrit 33.5 (L) 36.0 - 46.0 %    MCV 89 80 - 100 fL    MCH 28.3 26.0 - 34.0 pg    MCHC 31.9 (L) 32.0 - 36.0 g/dL    RDW 21.1 (H) 11.5 - 14.5 %    Platelets 158 150 - 450 x10*3/uL   POCT GLUCOSE   Result Value Ref Range    POCT Glucose 118 (H) 74 - 99 mg/dL   POCT GLUCOSE   Result Value Ref Range    POCT Glucose 89 74 - 99 mg/dL   Basic metabolic panel   Result Value Ref Range    Glucose 98 65 - 99 mg/dL    Sodium 139 133 - 145 mmol/L    Potassium 3.2 (L) 3.4 -  5.1 mmol/L    Chloride 100 97 - 107 mmol/L    Bicarbonate 30 24 - 31 mmol/L    Urea Nitrogen 5 (L) 8 - 25 mg/dL    Creatinine 1.70 (H) 0.40 - 1.60 mg/dL    eGFR 32 (L) >60 mL/min/1.73m*2    Calcium 8.6 8.5 - 10.4 mg/dL    Anion Gap 9 <=19 mmol/L   CBC   Result Value Ref Range    WBC 10.6 4.4 - 11.3 x10*3/uL    nRBC 0.0 0.0 - 0.0 /100 WBCs    RBC 3.79 (L) 4.00 - 5.20 x10*6/uL    Hemoglobin 10.8 (L) 12.0 - 16.0 g/dL    Hematocrit 33.0 (L) 36.0 - 46.0 %    MCV 87 80 - 100 fL    MCH 28.5 26.0 - 34.0 pg    MCHC 32.7 32.0 - 36.0 g/dL    RDW 21.1 (H) 11.5 - 14.5 %    Platelets 110 (L) 150 - 450 x10*3/uL   Prealbumin   Result Value Ref Range    Prealbumin 3.2 (L) 18.0 - 40.0 mg/dL   POCT GLUCOSE   Result Value Ref Range    POCT Glucose 87 74 - 99 mg/dL   POCT GLUCOSE   Result Value Ref Range    POCT Glucose 88 74 - 99 mg/dL   POCT GLUCOSE   Result Value Ref Range    POCT Glucose 99 74 - 99 mg/dL   POCT GLUCOSE   Result Value Ref Range    POCT Glucose 164 (H) 74 - 99 mg/dL   Basic metabolic panel   Result Value Ref Range    Glucose 117 (H) 65 - 99 mg/dL    Sodium 138 133 - 145 mmol/L    Potassium 3.3 (L) 3.4 - 5.1 mmol/L    Chloride 100 97 - 107 mmol/L    Bicarbonate 25 24 - 31 mmol/L    Urea Nitrogen 9 8 - 25 mg/dL    Creatinine 2.70 (H) 0.40 - 1.60 mg/dL    eGFR 18 (L) >60 mL/min/1.73m*2    Calcium 8.5 8.5 - 10.4 mg/dL    Anion Gap 13 <=19 mmol/L   CBC   Result Value Ref Range    WBC 10.5 4.4 - 11.3 x10*3/uL    nRBC 0.0 0.0 - 0.0 /100 WBCs    RBC 3.75 (L) 4.00 - 5.20 x10*6/uL    Hemoglobin 10.7 (L) 12.0 - 16.0 g/dL    Hematocrit 33.6 (L) 36.0 - 46.0 %    MCV 90 80 - 100 fL    MCH 28.5 26.0 - 34.0 pg    MCHC 31.8 (L) 32.0 - 36.0 g/dL    RDW 21.2 (H) 11.5 - 14.5 %    Platelets 120 (L) 150 - 450 x10*3/uL   POCT GLUCOSE   Result Value Ref Range    POCT Glucose 84 74 - 99 mg/dL   POCT GLUCOSE   Result Value Ref Range    POCT Glucose 117 (H) 74 - 99 mg/dL   POCT GLUCOSE   Result Value Ref Range    POCT Glucose 125 (H) 74 - 99  mg/dL   Vancomycin   Result Value Ref Range    Vancomycin 29.7 (H) 10.0 - 20.0 ug/mL   Basic metabolic panel   Result Value Ref Range    Glucose 105 (H) 65 - 99 mg/dL    Sodium 138 133 - 145 mmol/L    Potassium 3.6 3.4 - 5.1 mmol/L    Chloride 102 97 - 107 mmol/L    Bicarbonate 27 24 - 31 mmol/L    Urea Nitrogen 13 8 - 25 mg/dL    Creatinine 3.60 (H) 0.40 - 1.60 mg/dL    eGFR 13 (L) >60 mL/min/1.73m*2    Calcium 8.5 8.5 - 10.4 mg/dL    Anion Gap 9 <=19 mmol/L   CBC   Result Value Ref Range    WBC 10.4 4.4 - 11.3 x10*3/uL    nRBC 0.0 0.0 - 0.0 /100 WBCs    RBC 3.86 (L) 4.00 - 5.20 x10*6/uL    Hemoglobin 10.9 (L) 12.0 - 16.0 g/dL    Hematocrit 33.5 (L) 36.0 - 46.0 %    MCV 87 80 - 100 fL    MCH 28.2 26.0 - 34.0 pg    MCHC 32.5 32.0 - 36.0 g/dL    RDW 21.4 (H) 11.5 - 14.5 %    Platelets 141 (L) 150 - 450 x10*3/uL   POCT GLUCOSE   Result Value Ref Range    POCT Glucose 72 (L) 74 - 99 mg/dL   POCT GLUCOSE   Result Value Ref Range    POCT Glucose 90 74 - 99 mg/dL   Blood Gas Arterial Full Panel   Result Value Ref Range    POCT pH, Arterial 7.49 (H) 7.38 - 7.42 pH    POCT pCO2, Arterial 40 38 - 42 mm Hg    POCT pO2, Arterial 83 (L) 85 - 95 mm Hg    POCT SO2, Arterial 99 94 - 100 %    POCT Oxy Hemoglobin, Arterial 96.2 94.0 - 98.0 %    POCT Hematocrit Calculated, Arterial 33.0 (L) 36.0 - 46.0 %    POCT Sodium, Arterial 133 (L) 136 - 145 mmol/L    POCT Potassium, Arterial 3.6 3.5 - 5.3 mmol/L    POCT Chloride, Arterial 100 98 - 107 mmol/L    POCT Ionized Calcium, Arterial 1.08 (L) 1.10 - 1.33 mmol/L    POCT Glucose, Arterial 80 74 - 99 mg/dL    POCT Lactate, Arterial 1.8 0.4 - 2.0 mmol/L    POCT Base Excess, Arterial 6.6 (H) -2.0 - 3.0 mmol/L    POCT HCO3 Calculated, Arterial 30.5 (H) 22.0 - 26.0 mmol/L    POCT Hemoglobin, Arterial 10.9 (L) 12.0 - 16.0 g/dL    POCT Anion Gap, Arterial 6 (L) 10 - 25 mmo/L    Patient Temperature 37.0 degrees Celsius    FiO2 28 %    Apparatus CANNULA     Site of Arterial Puncture Radial Right      Paul's Test Positive    POCT GLUCOSE   Result Value Ref Range    POCT Glucose 59 (L) 74 - 99 mg/dL   POCT GLUCOSE   Result Value Ref Range    POCT Glucose 145 (H) 74 - 99 mg/dL   POCT GLUCOSE   Result Value Ref Range    POCT Glucose 109 (H) 74 - 99 mg/dL   POCT GLUCOSE   Result Value Ref Range    POCT Glucose 120 (H) 74 - 99 mg/dL   Vancomycin   Result Value Ref Range    Vancomycin 22.8 (H) 10.0 - 20.0 ug/mL   Basic metabolic panel   Result Value Ref Range    Glucose 101 (H) 65 - 99 mg/dL    Sodium 136 133 - 145 mmol/L    Potassium 3.3 (L) 3.4 - 5.1 mmol/L    Chloride 101 97 - 107 mmol/L    Bicarbonate 27 24 - 31 mmol/L    Urea Nitrogen 10 8 - 25 mg/dL    Creatinine 2.60 (H) 0.40 - 1.60 mg/dL    eGFR 19 (L) >60 mL/min/1.73m*2    Calcium 8.3 (L) 8.5 - 10.4 mg/dL    Anion Gap 8 <=19 mmol/L   CBC   Result Value Ref Range    WBC 9.5 4.4 - 11.3 x10*3/uL    nRBC 0.0 0.0 - 0.0 /100 WBCs    RBC 3.37 (L) 4.00 - 5.20 x10*6/uL    Hemoglobin 9.5 (L) 12.0 - 16.0 g/dL    Hematocrit 29.3 (L) 36.0 - 46.0 %    MCV 87 80 - 100 fL    MCH 28.2 26.0 - 34.0 pg    MCHC 32.4 32.0 - 36.0 g/dL    RDW 21.2 (H) 11.5 - 14.5 %    Platelets 100 (L) 150 - 450 x10*3/uL   POCT GLUCOSE   Result Value Ref Range    POCT Glucose 95 74 - 99 mg/dL   POCT GLUCOSE   Result Value Ref Range    POCT Glucose 93 74 - 99 mg/dL         SIGNATURE: Saeed Mondragon MD PATIENT NAME: Ayanna Gross   DATE: December 12, 2023 MRN: [id number]   TIME: 10:41 AM PAGER: [phone]

## 2023-12-12 NOTE — PROGRESS NOTES
Ayanna Gross is a 73 y.o. female on day 6 of admission presenting with Hypotension, unspecified hypotension type.      Subjective   Patient seen and examined. Awake/alert/oriented with episodes of forgetfulness. Cannot recall having dialysis yesterday. She is a poor historian. Denies chest pain, shortness of breath, nausea, or vomiting. No abdominal discomfort. Denies lightheadedness or dizziness.            Objective     Last Recorded Vitals  /73 (BP Location: Left arm)   Pulse 79   Temp 36.6 °C (97.9 °F) (Oral)   Resp 20   Wt 73.9 kg (163 lb)   SpO2 97%   Intake/Output last 3 Shifts:    Intake/Output Summary (Last 24 hours) at 12/12/2023 1246  Last data filed at 12/11/2023 1300  Gross per 24 hour   Intake 120 ml   Output --   Net 120 ml         Admission Weight  Weight: 72.6 kg (160 lb) (12/06/23 1301)    Daily Weight  12/10/23 : 73.9 kg (163 lb)    Image Results  CT head wo IV contrast  Narrative: Interpreted By:  Brendon Hook,   STUDY:  CT HEAD WO IV CONTRAST; 12/11/2023 5:20 pm      INDICATION:  Signs/Symptoms:acute confusion.      COMPARISON:  20 August 2023      ACCESSION NUMBER(S):  [id number]      ORDERING CLINICIAN:  RODRI SURESH      TECHNIQUE:  CT was performed with one or more of the following dose reduction  techniques: automated exposure control, adjustment of the mA and/or  kV according to patient size, or use of iterative reconstruction  technique.              PROCEDURE: 3.0 mm axial images were obtained through the brain, to  include the posterior fossa without intravenous contrast enhancement.      FINDINGS:  The patient's head is turned to the left and slightly canted in the  gantry. Mild motion artifact is seen. There is symmetric volume loss  of the cerebral hemispheres. Moderate  decreased attenuation in the  bilateral periventricular white matter, consistent with  microangiopathy is noted.  There is no encephalomalacic change.  Brainstem is unremarkable. Cerebellar  hemispheres are symmetric. No  subarachnoid, intraparenchymal, subdural or interventricular  hemorrhage. No intra- or extra-axial mass or abnormal blood products  are demonstrated. Hyperostosis frontalis interna is seen. Slight  hypoplasia of the left mastoid.      The paranasal sinuses and mastoids as well as calvarium are  unremarkable.      Impression: 1. No acute intracranial findings.  2. Symmetric volume loss of the cerebral hemispheres.  3. Periventricular Microangiopathy.  4. No posttraumatic abnormality.      This report has been produced using speech recognition.  This exam is available in DICOM format to non-affiliated healthcare  facilities on a secure media free searchable basis with prior patient  authorization. The patient exposure is reported to a radiation dose  index registry. All CT examinations are performed with one or more of  the following dose reduction techniques: Automated Exposure Control,  Adjustment of mA and/or KV according to patient size, or use of  iterative reconstruction techniques.      MACRO:  None      Signed by: Brendon Hook 12/11/2023 7:05 PM  Dictation workstation:   [id number]  Vascular US ankle brachial index (OCTAVIO) without exercise  Preliminary [place]iology [place] [address]             Phone [phone]                 Preliminary Vascular Lab Report     [facility] US ANKLE BRACHIAL INDEX (OCTAVIO) WITHOUT EXERCISE       Patient Name:     BASIA Gray Physician:  [id number] Lizet Messer MD  Study Date:       12/11/2023     Ordering Provider:  [id number] MARIANN SUTTON  MRN/PID:          [id number]       Fellow:  Accession#:       [id number]   Technologist:       Luz Maria Trivedi RVT  YOB: 1950       Technologist 2:     Dara Parr RVT  Gender:           F              Encounter#:         [id number]  Admission Status: Inpatient      Location Performed: [facility]       Diagnosis/ICD:  Other specified symptoms and signs involving the circulatory and                 respiratory systems-R09.89  Indication:    Peripheral vascular disease  CPT Codes:     11863.52 Peripheral artery OCTAVIO Only Reduced Service       Pertinent History: Absent pedal pulses. Patient is on dialysis.       PRELIMINARY CONCLUSIONS:     Right Lower PVR: Evidence of moderate arterial occlusive disease in the right lower extremity at rest. Right pressures of >220 mmHg suggest no compressibility of vessels and may make absolute Segmental Limb Pressures (SLP) unreliable. Decreased digital perfusion noted. Biphasic flow is noted in the right dorsalis pedis artery.  Left Lower PVR: Evidence of moderate arterial occlusive disease in the left lower extremity at rest. Left pressures of >220 mmHg suggest no compressibility of vessels and may make absolute Segmental Limb Pressures (SLP) unreliable. Decreased digital perfusion noted. Biphasic flow is noted in the left dorsalis pedis artery.  Additional Findings:  Technically difficult and limited exam due to patient's positioning, constant  movement and inability to assist with exam.       Imaging & Doppler Findings:     RIGHT Lower PVR              Pressures  Right Dorsalis Pedis (Ankle) 255 mmHg          LEFT Lower PVR              Pressures  Left Dorsalis Pedis (Ankle) 255 mmHg          VASCULAR PRELIMINARY REPORT  completed by Luz Maria Trivedi RVT on 12/11/2023 at 3:51:11 PM       ** Final **      Physical Exam  Vitals reviewed.   Constitutional:       Comments: Poor historian   HENT:      Head: Normocephalic and atraumatic.   Eyes:      Extraocular Movements: Extraocular movements intact.      Conjunctiva/sclera: Conjunctivae normal.   Cardiovascular:      Rate and Rhythm: Normal rate and regular rhythm.   Pulmonary:      Effort: Pulmonary effort is normal.      Breath sounds: No wheezing, rhonchi or rales.      Comments: Breath sounds diminished bilaterally  Abdominal:      General: Bowel  sounds are normal.      Palpations: Abdomen is soft.      Tenderness: There is no abdominal tenderness.   Skin:     General: Skin is warm and dry.      Comments: Dressing to BLE CDI   Neurological:      General: No focal deficit present.      Mental Status: She is alert and oriented to person, place, and time.         Relevant Results  Lab Results   Component Value Date    GLUCOSE 101 (H) 12/12/2023    CALCIUM 8.3 (L) 12/12/2023     12/12/2023    K 3.3 (L) 12/12/2023    CO2 27 12/12/2023     12/12/2023    BUN 10 12/12/2023    CREATININE 2.60 (H) 12/12/2023     Lab Results   Component Value Date    WBC 9.5 12/12/2023    HGB 9.5 (L) 12/12/2023    HCT 29.3 (L) 12/12/2023    MCV 87 12/12/2023     (L) 12/12/2023     US thoracentesis  Result Date: 12/8/2023  FINDINGS: A total of 750 cc of clear yellow fluid was aspirated. A sample was sent for analysis. The patient tolerated the procedure well.     Ultrasound-guided left thoracentesis.     Signed by: Sandro Hernandez 12/8/2023 3:01 PM     XR chest 1 view  Result Date: 12/8/2023  Decreased size of left effusion following ultrasound-guided thoracentesis with no pneumothorax.   There is some residual left pleural effusion noted with infiltrate in the left mid and lower lung field.   Signed by: Maria Garcia 12/8/2023 12:51 PM Dictation workstation:   [id number]    XR foot 3+ views bilateral  Result Date: 12/7/2023  1. Right foot: Large heel spur. Atherosclerotic disease. Suggestion of diffuse osteopenia. No sclerosis to suggest osteomyelitis. No subcutaneous air to suggest gangrene. Absent flanges 2nd ray with difficult to visualized distal phalanges of the other digits.   2. Left foot: Large heel spur. Degenerative changes of the hindfoot midfoot and forefoot with atherosclerotic disease. Absent distal phalanx of the great toe. No periostitis or abnormal sclerosis with diffuse osteopenia to suggest osteomyelitis. No subcutaneous air to suggest gangrene.            ECG 12 Lead  Result Date: 12/7/2023  Poor data quality, interpretation may be adversely affected Normal sinus rhythm Right bundle branch block Septal infarct , age undetermined Possible Lateral infarct , age undetermined Abnormal ECG No previous ECGs available     XR chest 1 view  New, moderate left pleural effusion.            Assessment/Plan      Principal Problem:    Hypotension, unspecified hypotension type  Active Problems:    Paroxysmal atrial fibrillation (CMS/HCC)    Peripheral vascular disease (CMS/HCC)    Orthostatic hypotension    Mixed hyperlipidemia    End-stage renal disease on hemodialysis (CMS/HCC)    DM (diabetes mellitus) (CMS/HCC)    Absent pedal pulses    Bilateral heel wounds   ID and podiatry following, appreciate recs  Wound care consult   doxycycline  Blood cultures negative to date  Bilateral boots to protect heels from pressure     Metabolic encephalopathy  Unsure if this is her baseline, tech says she waxes and wanes  Currently receiving dialysis, also on ATB for infection  CT head and ABG unremarkable  She was hypoglycemic yesterday which has resolved, mentation has improved, may be contributing factor    End-stage renal disease on hemodialysis   Consult Dr. Mondragon - appreciate recs  Continue Phoslo   HD per nephrology    Hypotension/orthostatic hypotension  Improved, monitor close  Continue midodrine   Check orthostatic vitals  Continue low-dose Toprol in am   Hold Imdur   Left arm fistula and history of right sided mastectomy. BP different in upper extremities than lower extremities.   Hypotension improved with albumin    Fall precautions   Systolic in the 60s overnight that I do not believe was accurate, increased to 100s systolic without intervention. PCA was told by nephrology to check in LUE with normal blood pressure result.    Peripheral vascular disease   vascular studies of lower extremities results as above  Consult vascular surgery, appreciate  recommendations    Paroxysmal atrial fibrillation  Continue low-dose beta blocker   Eliquis for stroke prophylaxis     Mixed hyperlipidemia  Continue statin, fenofibrate     DM (diabetes mellitus)   Hold Tradjenta   AC/HS glucose with SSI coverage   Hypoglycemia protocol     Groin excoriation   Wound care consult   Nystatin powder   Keep skin clean and dry   Sacrum covered with Mepilex.     C-diff   PCR positive and negative toxin.   Vancomycin 125 mg 4 times daily   ID consult - appreciate recs   Loose stools improved           Malnutrition Diagnosis Status: Ongoing  Malnutrition Diagnosis: Moderate malnutrition related to acute disease or injury  As Evidenced by: 1-2% unintentional wt loss in 1 week (~4% in 2 weeks), Poor intake <75% of estimated energy requirement > 7days, and mild fat and muscle deficits  I agree with the dietitian's malnutrition diagnosis.    Plan  HD per nephrology  Follow cultures  Continue ATB  Maintain precautions  CBC and BMP in AM  PT/OT  Discharge planning to SNF when medically stable         Inez Josue, MADHURI-CNP

## 2023-12-12 NOTE — PROGRESS NOTES
Physical Therapy    Physical Therapy Treatment    Patient Name: Ayanna Gross  MRN: [id number]  Today's Date: 12/12/2023  Time Calculation  Start Time: 1350  Stop Time: 1415  Time Calculation (min): 25 min       Assessment/Plan   PT Assessment  Assessment Comment: pt with impaired functional mobility, increased B LE weakness, and poor safety awareness. pt is confused and fatigues easily.  End of Session Patient Position:  (pt supine in bed with HOB elevated, 3 rails up, needs in reach, daughter entering room, RN aware.)     PT Plan  Treatment/Interventions: Bed mobility, Transfer training, Balance training, Strengthening, Endurance training, Therapeutic exercise, Therapeutic activity  PT Plan: Skilled PT  PT Frequency: 3 times per week  PT Discharge Recommendations: Moderate intensity level of continued care  Equipment Recommended upon Discharge: Wheelchair  PT Recommended Transfer Status: Assist x2, Total assist  PT - OK to Discharge: Yes      General Visit Information:   PT  Visit  PT Received On: 12/12/23  Response to Previous Treatment:  (pt is confused, daughter present at the start of therapy.)  General  Co-Treatment: OT  Co-Treatment Reason: necessary for 2 skilled persons due to heavy assist and patient/caregiver safety.  General Comment: pt cleared for therapy by nursing. pt supine in bed, 3 rails up, agreeable to therapy.    Subjective   Precautions:  Precautions  Precautions Comment: falls and isolation for C diff    Objective   Pain:  Pain Assessment  Pain Score:  (pt with pain in perineum during hygiene, no pain at rest)  Cognition:  Cognition  Orientation Level: Oriented X4  Cognition Comments: despite being oriented, pt is confused and making nonsensical comments    Activity Tolerance:  Activity Tolerance  Activity Tolerance Comments: fair-  Treatments:  Bed Mobility  Bed Mobility: Yes  Bed Mobility 1  Bed Mobility 1: Rolling left  Level of Assistance 1: Moderate assistance  Bed Mobility Comments 1:  manual placement of right hand on bed rail to maintain sidelying position for dependent hygiene. verbal cues for log rolling technique.  Bed Mobility 2  Bed Mobility  2: Rolling right  Level of Assistance 2: Moderate assistance  Bed Mobility Comments 2: verbal cues for technique and hand placement, min A to remain in sidelying to complete hygiene and linen change.  Bed Mobility 3  Bed Mobility 3: Scooting (to HOB)  Level of Assistance 3: Dependent  Bed Mobility Comments 3: use of glide sheet to boost to HOB    Other Activity  Other Activity Performed:  (pt performed rolling side to side several times for hygiene and linen change.)    Outcome Measures:  [facility] Basic Mobility  Turning from your back to your side while in a flat bed without using bedrails: Total  Moving from lying on your back to sitting on the side of a flat bed without using bedrails: Total  Moving to and from bed to chair (including a wheelchair): Total  Standing up from a chair using your arms (e.g. wheelchair or bedside chair): Total  To walk in hospital room: Total  Climbing 3-5 steps with railing: Total  Basic Mobility - Total Score: 6    Encounter Problems       Encounter Problems (Active)       Balance       STG - Maintains static sitting balance with B upper extremity support on the edge of the bed x 15 minutes  (Not Progressing)       Start:  12/07/23    Expected End:  01/14/24       INTERVENTIONS:  1. Practice sitting on the edge of a bed/mat with minimal support.  2. Educate patient about pressure relief.              Pain - Adult          Transfers       STG - Patient to transfer to and from sit to supine with mod A via log rolling for skin protection. (Not Progressing)       Start:  12/07/23    Expected End:  01/14/24            STG - Patient will perform bed mobility with min A to maintain pressure relief and prevent further skin integrity issues. (Progressing)       Start:  12/07/23    Expected End:  01/14/24            STG - Patient  will roll from side to side using the bed rail to assist with hygiene and pressure relief with min A. (Progressing)       Start:  12/07/23    Expected End:  01/14/24

## 2023-12-12 NOTE — PROGRESS NOTES
Ayanna Gross is a 73 y.o. female on day 6 of admission presenting with Hypotension, unspecified hypotension type.    [facility] able to accept patient at this time, precert needed prior to transfer ADOD 2-3 days updates sent to [place] at this time     **Patient does not have safe discharge plan, do not discharge without speaking to care coordination**     Irene Patterson RN

## 2023-12-13 NOTE — PROGRESS NOTES
Hemodialysis procedure note:  I saw her during her hemodialysis procedure.  She was mentating more appropriately.  Her blood pressure is also more appropriate.  She has improvement in her diarrhea.  As she is on oral antibiotics, we are giving her nothing IV here, I think it is appropriate to discharge her at this point.  She will follow-up with her outpatient dialysis sessions on Mondays, Wednesdays, Fridays at [facility] in [place].

## 2023-12-13 NOTE — PROGRESS NOTES
Ayanna Gross is a 73 y.o. female on day 7 of admission presenting with Hypotension, unspecified hypotension type.    Patient not medically clear. Patient can go to [facility] upon discharge will need precert     1505: atif Wiley CNP pt nearing discharge precert started at this time  Precert Status: Pending  [facility] [id number]  Date: 12/13/2023     **Patient does not have safe discharge plan, do not discharge without speaking to care coordination**     Irene Patterson RN

## 2023-12-13 NOTE — PROGRESS NOTES
Ayanna Gross is a 73 y.o. female on day 7 of admission presenting with Hypotension, unspecified hypotension type.      Subjective   Patient seen and examined. Awake/alert/oriented. Tolerating dialysis, tech in at bedside. Denies chest pain, shortness of breath, nausea, or vomiting. No abdominal discomfort.            Objective     Last Recorded Vitals  /70 (BP Location: Right arm, Patient Position: Lying)   Pulse 95   Temp 36.5 °C (97.7 °F) (Temporal)   Resp 18   Wt 73.9 kg (163 lb)   SpO2 92%   Intake/Output last 3 Shifts:    Intake/Output Summary (Last 24 hours) at 12/13/2023 1055  Last data filed at 12/13/2023 0840  Gross per 24 hour   Intake 600 ml   Output --   Net 600 ml         Admission Weight  Weight: 72.6 kg (160 lb) (12/06/23 1301)    Daily Weight  12/10/23 : 73.9 kg (163 lb)    Image Results  Vascular US ankle brachial index (OCTAVIO) without [place] [address]             Phone [phone]       Vascular Lab Report     [facility] US ANKLE BRACHIAL INDEX (OCTAVIO) WITHOUT EXERCISE    Patient Name:      AYANNA GROSS      Reading Physician: [id number] Lizet Messer MD  Study Date:        12/11/2023          Ordering Provider: [id number] MARIANN SUTTON  MRN/PID:           [id number]            Fellow:  Accession#:        [id number]        Technologist:      Luz Maria Trivedi RVT  Date of Birth/Age: 1950 / 73 years Technologist 2:    Dara Parr RVT  Gender:            F                   Encounter#:        [id number]  Admission Status:  Inpatient           Location           [facility]                                         Performed:       Diagnosis/ICD: Other specified symptoms and signs involving the circulatory and                 respiratory systems-R09.89  Indication:    Peripheral vascular disease  CPT Codes:     33038.52 Peripheral artery OCTAVIO Only Reduced Service        Pertinent History: Absent pedal pulses. Patient is on dialysis.       CONCLUSIONS:  Right Lower PVR: Evidence of moderate arterial occlusive disease in the right lower extremity at rest. Right pressures of >220 mmHg suggest no compressibility of vessels and may make absolute Segmental Limb Pressures (SLP) unreliable. Decreased digital perfusion noted. Biphasic flow is noted in the right dorsalis pedis artery.  Left Lower PVR: Evidence of moderate arterial occlusive disease in the left lower extremity at rest. Left pressures of >220 mmHg suggest no compressibility of vessels and may make absolute Segmental Limb Pressures (SLP) unreliable. Decreased digital perfusion noted. Biphasic flow is noted in the left dorsalis pedis artery.  Additional Findings:  Technically difficult and limited exam due to patient's positioning, movement  and inability to cooperate with exam.       Imaging & Doppler Findings:     RIGHT Lower PVR              Pressures  Right Dorsalis Pedis (Ankle) 255 mmHg          LEFT Lower PVR              Pressures  Left Dorsalis Pedis (Ankle) 255 mmHg          [id number] Lizet Messer MD  Electronically signed by [id number] Lizet Messer MD on 12/13/2023 at 8:38:56 AM       ** Final **      Physical Exam  Vitals reviewed.   Constitutional:       Comments: Poor historian   HENT:      Head: Normocephalic and atraumatic.   Eyes:      Extraocular Movements: Extraocular movements intact.      Conjunctiva/sclera: Conjunctivae normal.   Cardiovascular:      Rate and Rhythm: Normal rate and regular rhythm.   Pulmonary:      Effort: Pulmonary effort is normal.      Breath sounds: No wheezing, rhonchi or rales.      Comments: Breath sounds diminished bilaterally  Abdominal:      General: Bowel sounds are normal.      Palpations: Abdomen is soft.      Tenderness: There is no abdominal tenderness.   Skin:     General: Skin is warm and dry.      Comments: Dressing to BLE CDI   Neurological:      General: No focal deficit  present.      Mental Status: She is alert and oriented to person, place, and time.         Relevant Results  Lab Results   Component Value Date    GLUCOSE 139 (H) 12/13/2023    CALCIUM 8.7 12/13/2023     12/13/2023    K 3.8 12/13/2023    CO2 27 12/13/2023     12/13/2023    BUN 16 12/13/2023    CREATININE 3.40 (H) 12/13/2023     Lab Results   Component Value Date    WBC 12.3 (H) 12/13/2023    HGB 11.3 (L) 12/13/2023    HCT 35.3 (L) 12/13/2023    MCV 88 12/13/2023     (L) 12/13/2023     US thoracentesis  Result Date: 12/8/2023  FINDINGS: A total of 750 cc of clear yellow fluid was aspirated. A sample was sent for analysis. The patient tolerated the procedure well.     Ultrasound-guided left thoracentesis.     Signed by: Sandro Hernandez 12/8/2023 3:01 PM     XR chest 1 view  Result Date: 12/8/2023  Decreased size of left effusion following ultrasound-guided thoracentesis with no pneumothorax.   There is some residual left pleural effusion noted with infiltrate in the left mid and lower lung field.   Signed by: Maria Garcia 12/8/2023 12:51 PM Dictation workstation:   [id number]    XR foot 3+ views bilateral  Result Date: 12/7/2023  1. Right foot: Large heel spur. Atherosclerotic disease. Suggestion of diffuse osteopenia. No sclerosis to suggest osteomyelitis. No subcutaneous air to suggest gangrene. Absent flanges 2nd ray with difficult to visualized distal phalanges of the other digits.   2. Left foot: Large heel spur. Degenerative changes of the hindfoot midfoot and forefoot with atherosclerotic disease. Absent distal phalanx of the great toe. No periostitis or abnormal sclerosis with diffuse osteopenia to suggest osteomyelitis. No subcutaneous air to suggest gangrene.           ECG 12 Lead  Result Date: 12/7/2023  Poor data quality, interpretation may be adversely affected Normal sinus rhythm Right bundle branch block Septal infarct , age undetermined Possible Lateral infarct , age undetermined  Abnormal ECG No previous ECGs available     XR chest 1 view  New, moderate left pleural effusion.            Assessment/Plan      Principal Problem:    Hypotension, unspecified hypotension type  Active Problems:    Paroxysmal atrial fibrillation (CMS/HCC)    Peripheral vascular disease (CMS/HCC)    Orthostatic hypotension    Mixed hyperlipidemia    End-stage renal disease on hemodialysis (CMS/HCC)    DM (diabetes mellitus) (CMS/HCC)    Absent pedal pulses    Bilateral heel wounds   ID and podiatry following, appreciate recs  Wound care consult   doxycycline  Blood cultures negative, final  Bilateral boots to protect heels from pressure     Metabolic encephalopathy-improved  Unsure if this is her baseline, tech says she waxes and wanes  CT head and ABG unremarkable  Improved    End-stage renal disease on hemodialysis   Consult Dr. Mondragon - appreciate recs  Continue Phoslo   HD per nephrology    Hypotension/orthostatic hypotension  Improved, monitor close  Continue midodrine   Check orthostatic vitals  Continue low-dose Toprol in am   Hold Imdur   Left arm fistula and history of right sided mastectomy. BP different in upper extremities than lower extremities.   Hypotension improved with albumin    Fall precautions     Peripheral vascular disease   vascular studies of lower extremities results as above  Consult vascular surgery, appreciate recommendations  Vascular recommending palliative to follow up on goals of care, may require angiogram as outpatient if she improves, currently not surgical candidate     Paroxysmal atrial fibrillation  Continue low-dose beta blocker   Eliquis for stroke prophylaxis     Mixed hyperlipidemia  Continue statin, fenofibrate     DM (diabetes mellitus)   Hold Tradjenta   AC/HS glucose with SSI coverage   Hypoglycemia protocol     Groin excoriation   Wound care consult   Nystatin powder   Keep skin clean and dry   Sacrum covered with Mepilex.     C-diff   PCR positive and negative toxin.    Vancomycin 125 mg 4 times daily   ID consult - appreciate recs   Loose stools improved           Malnutrition Diagnosis Status: Ongoing  Malnutrition Diagnosis: Moderate malnutrition related to acute disease or injury  As Evidenced by: 1-2% unintentional wt loss in 1 week (~4% in 2 weeks), Poor intake <75% of estimated energy requirement > 7days, and mild fat and muscle deficits  I agree with the dietitian's malnutrition diagnosis.    Plan  HD per nephrology  Pleural fluid showed moderate polymorphonuclear leukocytes, no organisms seen  Blood culture negative  Continue doxcycline as suppressive therapy for MRSA bacteremia with possible infective endocarditis  Oral vancomycin for a total of 14 days  Maintain precautions  CBC and BMP in AM  PT/OT  Did reach out to podiatry and updated on vascular recommendations, awaiting final input from podiatry. Did also reach to palliative care.   Discharge planning to SNF when medically stable         Inez Josue, MADHURI-CNP

## 2023-12-13 NOTE — PROGRESS NOTES
"Ayanna Gross is a 73 y.o. female on day 7 of admission presenting with Hypotension, unspecified hypotension type.      Subjective      Objective    Last Recorded Vitals      12/12/2023    11:50 AM 12/12/2023    12:46 PM 12/12/2023     3:22 PM 12/12/2023     8:48 PM 12/13/2023    12:54 AM 12/13/2023     5:18 AM 12/13/2023     8:30 AM   Vitals   Systolic 100 98 107 91 116 133    Diastolic 73 69 51 57 88 70    Heart Rate   81 73 68 95    Temp   36.5 °C (97.7 °F) 36.6 °C (97.9 °F) 36.6 °C (97.9 °F) 36.4 °C (97.5 °F) 36.5 °C (97.7 °F)   Resp   18 18 16 18    Height (in)       1.575 m (5' 2.01\")   BMI       29.81 kg/m2   BSA (m2)       1.8 m2       Imaging  Vascular US ankle brachial index (OCTAVIO) without exercise    Result Date: 12/13/2023           [place] [address]            Phone [phone]  Vascular Lab Report  [facility] US ANKLE BRACHIAL INDEX (OCTAVIO) WITHOUT EXERCISE Patient Name:      AYANNA GROSS      Reading Physician: [id number] Lizet Messer MD Study Date:        12/11/2023          Ordering Provider: [id number] MARIANN SUTTON MRN/PID:           [id number]            Fellow: Accession#:        [id number]        Technologist:      Luz Maria Trivedi RVT Date of Birth/Age: 1950 / 73 years Technologist 2:    Dara Parr RVT Gender:            F                   Encounter#:        [id number] Admission Status:  Inpatient           Location           [facility]                                        Performed:  Diagnosis/ICD: Other specified symptoms and signs involving the circulatory and                respiratory systems-R09.89 Indication:    Peripheral vascular disease CPT Codes:     87894.52 Peripheral artery OCTAVIO Only Reduced Service  Pertinent History: Absent pedal pulses. Patient is on dialysis.  CONCLUSIONS: Right Lower PVR: Evidence of moderate arterial occlusive disease in the right lower extremity at " rest. Right pressures of >220 mmHg suggest no compressibility of vessels and may make absolute Segmental Limb Pressures (SLP) unreliable. Decreased digital perfusion noted. Biphasic flow is noted in the right dorsalis pedis artery. Left Lower PVR: Evidence of moderate arterial occlusive disease in the left lower extremity at rest. Left pressures of >220 mmHg suggest no compressibility of vessels and may make absolute Segmental Limb Pressures (SLP) unreliable. Decreased digital perfusion noted. Biphasic flow is noted in the left dorsalis pedis artery. Additional Findings: Technically difficult and limited exam due to patient's positioning, movement and inability to cooperate with exam.  Imaging & Doppler Findings:  RIGHT Lower PVR              Pressures Right Dorsalis Pedis (Ankle) 255 mmHg   LEFT Lower PVR              Pressures Left Dorsalis Pedis (Ankle) 255 mmHg   [id number] Lizet Messer MD Electronically signed by [id number] Lizet Messer MD on 12/13/2023 at 8:38:56 AM  ** Final **     CT head wo IV contrast    Result Date: 12/11/2023  Interpreted By:  Brendon Hook, STUDY: CT HEAD WO IV CONTRAST; 12/11/2023 5:20 pm   INDICATION: Signs/Symptoms:acute confusion.   COMPARISON: 20 August 2023   ACCESSION NUMBER(S): [id number]   ORDERING CLINICIAN: RODRI SURESH   TECHNIQUE: CT was performed with one or more of the following dose reduction techniques: automated exposure control, adjustment of the mA and/or kV according to patient size, or use of iterative reconstruction technique.       PROCEDURE: 3.0 mm axial images were obtained through the brain, to include the posterior fossa without intravenous contrast enhancement.   FINDINGS: The patient's head is turned to the left and slightly canted in the gantry. Mild motion artifact is seen. There is symmetric volume loss of the cerebral hemispheres. Moderate  decreased attenuation in the bilateral periventricular white matter, consistent with microangiopathy is noted.  There  is no encephalomalacic change. Brainstem is unremarkable. Cerebellar hemispheres are symmetric. No subarachnoid, intraparenchymal, subdural or interventricular hemorrhage. No intra- or extra-axial mass or abnormal blood products are demonstrated. Hyperostosis frontalis interna is seen. Slight hypoplasia of the left mastoid.   The paranasal sinuses and mastoids as well as calvarium are unremarkable.       1. No acute intracranial findings. 2. Symmetric volume loss of the cerebral hemispheres. 3. Periventricular Microangiopathy. 4. No posttraumatic abnormality.   This report has been produced using speech recognition. This exam is available in DICOM format to non-affiliated healthcare facilities on a secure media free searchable basis with prior patient authorization. The patient exposure is reported to a radiation dose index registry. All CT examinations are performed with one or more of the following dose reduction techniques: Automated Exposure Control, Adjustment of mA and/or KV according to patient size, or use of iterative reconstruction techniques.   MACRO: None   Signed by: Brendon Hook 12/11/2023 7:05 PM Dictation workstation:   [id number]        Relevant Results  Results for orders placed or performed during the hospital encounter of 12/06/23 (from the past 24 hour(s))   POCT GLUCOSE   Result Value Ref Range    POCT Glucose 124 (H) 74 - 99 mg/dL   POCT GLUCOSE   Result Value Ref Range    POCT Glucose 117 (H) 74 - 99 mg/dL   Basic metabolic panel   Result Value Ref Range    Glucose 139 (H) 65 - 99 mg/dL    Sodium 138 133 - 145 mmol/L    Potassium 3.8 3.4 - 5.1 mmol/L    Chloride 100 97 - 107 mmol/L    Bicarbonate 27 24 - 31 mmol/L    Urea Nitrogen 16 8 - 25 mg/dL    Creatinine 3.40 (H) 0.40 - 1.60 mg/dL    eGFR 14 (L) >60 mL/min/1.73m*2    Calcium 8.7 8.5 - 10.4 mg/dL    Anion Gap 11 <=19 mmol/L   CBC   Result Value Ref Range    WBC 12.3 (H) 4.4 - 11.3 x10*3/uL    nRBC 0.0 0.0 - 0.0 /100 WBCs    RBC 4.00  "4.00 - 5.20 x10*6/uL    Hemoglobin 11.3 (L) 12.0 - 16.0 g/dL    Hematocrit 35.3 (L) 36.0 - 46.0 %    MCV 88 80 - 100 fL    MCH 28.3 26.0 - 34.0 pg    MCHC 32.0 32.0 - 36.0 g/dL    RDW 21.7 (H) 11.5 - 14.5 %    Platelets 121 (L) 150 - 450 x10*3/uL   POCT GLUCOSE   Result Value Ref Range    POCT Glucose 142 (H) 74 - 99 mg/dL         Physical Exam  General: Pleasant, conversive.  Confused at times.  HEENT: Head is atraumatic, normocephalic. PERRL. No cervical bruits  Cardiac: Normal S1-S2.  Regular rate and rhythm.  No murmurs.  Respiratory: Lungs clear to auscultation.  No adventitious sounds.  Abdomen: Soft, nondistended, nontender.  Bowel sounds x4 quadrants.  Pulse exam: Nonpalpable popliteal and pedal pulses bilaterally.  Extremities: Necrotic heel ulcerations bilaterally.  Minimal surrounding tissue erythema.  Patient has a left upper extremity AV fistula.  Neuro: Moves all extremities spontaneously.  No focal deficits.  Psych: Appropriate affect.  Answers questions appropriately.    Assessment/Plan   Peripheral arterial disease  Necrotic heel ulcerations bilaterally     Peripheral arterial disease-patient had a limited arterial study due to her inability to cooperate.  Patient likely has peripheral arterial disease based upon my physical examination and review of the arterial study.  She has had chronic ulcerations x 1 year to bilateral heels.  She sees podiatry for wound care.  These have likely been exacerbated by the fact that she was discharged home from her rehab facility and has been unable to care for herself.  Her  is also unable to care for her in any meaningful way.  According to her daughter, she was just \"laying there\" at home prior to this hospitalization.  The underlying etiology of these ulcerations is likely pressure.  Do not believe patient is a surgical candidate currently due to her overall clinical picture.  Would recommend palliative follow-up to help determine goals of care, however, " she was last seen by them on December 10 and they had not heard back from her .  If patient wants to pursue aggressive measures, she may require an angiogram on an outpatient basis once her clinical status improves.  Follow-up orders with Dr. Watson placed, I also stated that if she wishes to follow with our service instead she could call the listed number.

## 2023-12-13 NOTE — PROGRESS NOTES
Ayanna Gross is a 73 y.o. female on day 7 of admission presenting with Hypotension, unspecified hypotension type.    Subjective   Interval History:    afebrile, no chills  Undergoing dialysis when seen  Denies diarrhea  No abdominal pain, nausea or vomiting        Review of Systems    Objective   Range of Vitals (last 24 hours)  Heart Rate:  [68-95]   Temp:  [36.4 °C (97.5 °F)-36.6 °C (97.9 °F)]   Resp:  [16-20]   BP: ()/(51-88)   SpO2:  [92 %-100 %]   Daily Weight  12/10/23 : 73.9 kg (163 lb)    Body mass index is 29.81 kg/m².    Physical Exam  Constitutional:       Appearance: Normal appearance.  Alert but confused  HENT:      Head: Normocephalic and atraumatic.      Nose: Nose normal.      Mouth/Throat:      Mouth: Mucous membranes are moist.      Pharynx: Oropharynx is clear.   Eyes:      Extraocular Movements: Extraocular movements intact.      Conjunctiva/sclera: Conjunctivae normal.   Cardiovascular:      Rate and Rhythm: Normal rate and regular rhythm.   Pulmonary:      Effort: Pulmonary effort is normal.      Breath sounds: Normal breath sounds.   Abdominal:      General: Bowel sounds are normal.      Palpations: Abdomen is soft.   Musculoskeletal:         General: Normal range of motion.      Cervical back: Normal range of motion and neck supple.      Right lower leg: Edema present.      Left lower leg: Edema present.   Skin:     Comments:  mild groin fold erythema  LUIS fistula    Antibiotics  sodium chloride 0.9 % bolus 500 mL  cefepime (Maxipime) 1 g in dextrose 5 % 50 mL IV  vancomycin-diluent combo no.1 (Xellia) IVPB 1 g  apixaban (Eliquis) tablet 2.5 mg  calcium acetate (Phoslo) capsule 2,000 mg  escitalopram (Lexapro) tablet 10 mg  febuxostat (Uloric) tablet 40 mg  fenofibrate (Triglide) tablet 160 mg  gabapentin (Neurontin) capsule 300 mg  metoprolol succinate XL (Toprol-XL) 24 hr tablet 12.5 mg  midodrine (Proamatine) tablet 10 mg  nystatin (Mycostatin) 100,000 unit/gram powder 1  Application  B complex-vitamin C tablet 1 tablet  simvastatin (Zocor) tablet 40 mg  acetaminophen (Tylenol) tablet 650 mg  acetaminophen (Tylenol) tablet 650 mg  polyethylene glycol (Glycolax, Miralax) packet 17 g  dextrose 50 % injection 25 g  glucagon (Glucagen) injection 1 mg  dextrose 10 % in water (D10W) infusion  insulin lispro (HumaLOG) injection 0-5 Units  zinc oxide 20 % ointment 1 Application  calcium acetate (Phoslo) capsule 667 mg  vancomycin (Vancocin) capsule 125 mg  lidocaine PF (Xylocaine) 10 mg/mL (1 %) injection  heparin 1,000 unit/mL injection 2,000 Units  heparin 1,000 unit/mL injection 2,000 Units  midodrine (Proamatine) tablet 15 mg  albumin human 25 % solution 25 g  polyethylene glycol (Glycolax, Miralax) packet 17 g  collagenase 250 unit/gram ointment  vancomycin (Vancocin) 1,750 mg in dextrose 5 % in water (D5W) 250 mL IV  vancomycin-diluent combo no.1 (Xellia) IVPB 1,750 mg  albumin human 25 % solution 12.5 g  heparin 1,000 unit/mL injection 2,000 Units  heparin 1,000 unit/mL injection 2,000 Units  vancomycin (Vancocin) placeholder  doxycycline (Vibramycin) capsule 100 mg  potassium chloride CR (Klor-Con) ER tablet 10 mEq  gabapentin (Neurontin) capsule 100 mg  heparin 1,000 unit/mL injection 2,000 Units  heparin 1,000 unit/mL injection 2,000 Units      Relevant Results  Labs  Results from last 72 hours   Lab Units 12/13/23 0457 12/12/23 0446 12/11/23 0456   WBC AUTO x10*3/uL 12.3* 9.5 10.4   HEMOGLOBIN g/dL 11.3* 9.5* 10.9*   HEMATOCRIT % 35.3* 29.3* 33.5*   PLATELETS AUTO x10*3/uL 121* 100* 141*     Results from last 72 hours   Lab Units 12/13/23 0457 12/12/23 0446 12/11/23 0456   SODIUM mmol/L 138 136 138   POTASSIUM mmol/L 3.8 3.3* 3.6   CHLORIDE mmol/L 100 101 102   CO2 mmol/L 27 27 27   BUN mg/dL 16 10 13   CREATININE mg/dL 3.40* 2.60* 3.60*   GLUCOSE mg/dL 139* 101* 105*   CALCIUM mg/dL 8.7 8.3* 8.5   ANION GAP mmol/L 11 8 9   EGFR mL/min/1.73m*2 14* 19* 13*         Estimated  Creatinine Clearance: 14.3 mL/min (A) (by C-G formula based on SCr of 3.4 mg/dL (H)).  CRP   Date Value Ref Range Status   06/23/2023 19.9 (H) 0 - 2.0 MG/DL Final     Comment:     Performed at [address] [address]   05/22/2022 1.0 0 - 2.0 MG/DL Final     Comment:     Performed at [address] [address]   12/13/2020 10.1 (H) 0 - 2.0 MG/DL Final     Comment:     Performed at [address] [address]     Microbiology   reviewed  Imaging  Vascular US ankle brachial index (OCTAVIO) without exercise    Result Date: 12/13/2023           [place] [address]            Phone [phone]  Vascular Lab Report  [facility] US ANKLE BRACHIAL INDEX (OCTAVIO) WITHOUT EXERCISE Patient Name:      BASIA Gray Physician: [id number] iLzet Messer MD Study Date:        12/11/2023          Ordering Provider: [id number] MARIANN SUTTON MRN/PID:           [id number]            Fellow: Accession#:        [id number]        Technologist:      Luz Maria Trivedi RVT Date of Birth/Age: 1950 / 73 years Technologist 2:    Dara Parr RVT Gender:            F                   Encounter#:        [id number] Admission Status:  Inpatient           Location           [facility]                                        Performed:  Diagnosis/ICD: Other specified symptoms and signs involving the circulatory and                respiratory systems-R09.89 Indication:    Peripheral vascular disease CPT Codes:     36961.52 Peripheral artery OCTAVIO Only Reduced Service  Pertinent History: Absent pedal pulses. Patient is on dialysis.  CONCLUSIONS: Right Lower PVR: Evidence of moderate arterial occlusive disease in the right lower extremity at rest. Right pressures of >220 mmHg suggest no compressibility of vessels and may make absolute Segmental Limb Pressures (SLP) unreliable. Decreased  digital perfusion noted. Biphasic flow is noted in the right dorsalis pedis artery. Left Lower PVR: Evidence of moderate arterial occlusive disease in the left lower extremity at rest. Left pressures of >220 mmHg suggest no compressibility of vessels and may make absolute Segmental Limb Pressures (SLP) unreliable. Decreased digital perfusion noted. Biphasic flow is noted in the left dorsalis pedis artery. Additional Findings: Technically difficult and limited exam due to patient's positioning, movement and inability to cooperate with exam.  Imaging & Doppler Findings:  RIGHT Lower PVR              Pressures Right Dorsalis Pedis (Ankle) 255 mmHg   LEFT Lower PVR              Pressures Left Dorsalis Pedis (Ankle) 255 mmHg   [id number] Lizet Messer MD Electronically signed by [id number] Lizet Messer MD on 12/13/2023 at 8:38:56 AM  ** Final **     CT head wo IV contrast    Result Date: 12/11/2023  Interpreted By:  Brendon Hook, STUDY: CT HEAD WO IV CONTRAST; 12/11/2023 5:20 pm   INDICATION: Signs/Symptoms:acute confusion.   COMPARISON: 20 August 2023   ACCESSION NUMBER(S): [id number]   ORDERING CLINICIAN: RODRI SURESH   TECHNIQUE: CT was performed with one or more of the following dose reduction techniques: automated exposure control, adjustment of the mA and/or kV according to patient size, or use of iterative reconstruction technique.       PROCEDURE: 3.0 mm axial images were obtained through the brain, to include the posterior fossa without intravenous contrast enhancement.   FINDINGS: The patient's head is turned to the left and slightly canted in the gantry. Mild motion artifact is seen. There is symmetric volume loss of the cerebral hemispheres. Moderate  decreased attenuation in the bilateral periventricular white matter, consistent with microangiopathy is noted.  There is no encephalomalacic change. Brainstem is unremarkable. Cerebellar hemispheres are symmetric. No subarachnoid, intraparenchymal, subdural or  interventricular hemorrhage. No intra- or extra-axial mass or abnormal blood products are demonstrated. Hyperostosis frontalis interna is seen. Slight hypoplasia of the left mastoid.   The paranasal sinuses and mastoids as well as calvarium are unremarkable.       1. No acute intracranial findings. 2. Symmetric volume loss of the cerebral hemispheres. 3. Periventricular Microangiopathy. 4. No posttraumatic abnormality.   This report has been produced using speech recognition. This exam is available in DICOM format to non-affiliated healthcare facilities on a secure media free searchable basis with prior patient authorization. The patient exposure is reported to a radiation dose index registry. All CT examinations are performed with one or more of the following dose reduction techniques: Automated Exposure Control, Adjustment of mA and/or KV according to patient size, or use of iterative reconstruction techniques.   MACRO: None   Signed by: Brendon Hook 12/11/2023 7:05 PM Dictation workstation:   [id number]    US thoracentesis    Result Date: 12/8/2023  Interpreted By:  Sandro Hernandez, STUDY: US THORACENTESIS; 12/7/2023 3:16 pm   INDICATION: Signs/Symptoms:Left pleural effusion question of whether this needs thoracentesis, history of ESRD, just had dialysis today.   COMPARISON: None   ACCESSION NUMBER(S): [id number]   ORDERING CLINICIAN: JEZ LONG   TECHNIQUE: Informed consent obtained. Patient positionedsitting upright. Skin prepped, draped and anesthetized. Under ultrasound guidance, a centesis catheter/needle was advanced into rightpleural cavity.   FINDINGS: A total of 750 cc of clear yellow fluid was aspirated. A sample was sent for analysis. The patient tolerated the procedure well.       Ultrasound-guided left thoracentesis.     Signed by: Sandro Hernandez 12/8/2023 3:01 PM Dictation workstation:   [id number]    XR chest 1 view    Result Date: 12/8/2023  Interpreted By:  Maria Garcia, STUDY: XR CHEST 1  VIEW 12/8/2023 11:57 am   INDICATION: Signs/Symptoms:S/P thoracentesis   COMPARISON: 12/06/2023   ACCESSION NUMBER(S): [id number]   ORDERING CLINICIAN: JEZ LONG   TECHNIQUE: AP erect view of the chest at bedside   FINDINGS: There is an interval decrease in size of left pleural effusion when compared with the study done 2 days earlier. No pneumothorax is seen.   There is some residual left pleural effusion with infiltrates seen in the left mid and lower lung field. The right lung is clear.   There are surgical clips within the left axilla. Stent grafts are visible in the left subclavian region extending into the left axillary region. A tunneled right jugular dialysis catheter terminates within right atrium.       Decreased size of left effusion following ultrasound-guided thoracentesis with no pneumothorax.   There is some residual left pleural effusion noted with infiltrate in the left mid and lower lung field.   Signed by: Maria Garcia 12/8/2023 12:51 PM Dictation workstation:   [id number]    XR foot 3+ views bilateral    Result Date: 12/7/2023  Interpreted By:  Brendon Hook, STUDY: XR FOOT 3+ VIEWS BILATERAL; 12/7/2023 3:31 pm   INDICATION: Signs/Symptoms:Wound/Abscess/Acute neha process rule out/Comparative Exam.   COMPARISON: None   ACCESSION NUMBER(S): [id number]   ORDERING CLINICIAN: JOSSELYN STAHL   TECHNIQUE: Nonweightbearing AP, lateral and oblique views of the bilateral foot were performed.   FINDINGS: Right foot: Large heel spur is seen. Mild Achilles enthesopathy demonstrated. Moderate atherosclerotic disease is seen. There is suggestion of loss of the arch on this nonweightbearing view. Degenerative change anterior plafond demonstrated and dorsal talus as well as posterior plafond and dorsal talus. Sclerosis anterior calcaneus is seen. Degenerative changes mild between the midfoot and forefoot. 1st metatarsophalangeal joint degenerative changes demonstrated. 2nd ray absent phalanges.  Distal phalanges of the 3rd through 5th rays with overlap were difficult to visualize. Distal phalanx of the great toe is difficult to visualize.   Left foot: Large heel spur is seen. Advanced atherosclerotic disease demonstrated. Degenerative change in the calcaneus and talus seen with moderate anterior and mild posterior plafond degenerative change. Mild degenerative change in the midfoot and forefoot is seen. Distal phalanx of the great toe is absent. Degenerative change of the 2nd ray phalanges and 3rd ray phalanges seen with degenerative change of the 4th and 5th digits not well seen.       1. Right foot: Large heel spur. Atherosclerotic disease. Suggestion of diffuse osteopenia. No sclerosis to suggest osteomyelitis. No subcutaneous air to suggest gangrene. Absent flanges 2nd ray with difficult to visualized distal phalanges of the other digits.   2. Left foot: Large heel spur. Degenerative changes of the hindfoot midfoot and forefoot with atherosclerotic disease. Absent distal phalanx of the great toe. No periostitis or abnormal sclerosis with diffuse osteopenia to suggest osteomyelitis. No subcutaneous air to suggest gangrene.       Signed by: Brendon Hook 12/7/2023 8:39 PM Dictation workstation:   [id number]    ECG 12 Lead    Result Date: 12/7/2023   Poor data quality, interpretation may be adversely affected Normal sinus rhythm Right bundle branch block Septal infarct , age undetermined Possible Lateral infarct , age undetermined Abnormal ECG No previous ECGs available Confirmed by Hammad Callahan ([id number]) on 12/7/2023 1:07:11 PM    XR chest 1 view    Result Date: 12/6/2023  Interpreted By:  Roger Murdock, STUDY: XR CHEST 1 VIEW;  12/6/2023 2:08 pm   INDICATION: Signs/Symptoms:Brief AMS and hypotension, now resolved.   COMPARISON: 08/21/2023..   ACCESSION NUMBER(S): [id number]   ORDERING CLINICIAN: PATEL TINSLEY   FINDINGS: New moderate left pleural effusion. There is silhouetting of the left heart border.  Left subclavian stent present. Right-sided double-lumen central venous catheter terminates within the right ventricle. No acute osseous findings.       New, moderate left pleural effusion.     MACRO: None   Signed by: Roger Murdock 12/6/2023 2:18 PM Dictation workstation:   [id number]     Assessment/Plan     Unstageable Bilateral heel ulcers  C-difficile infection-resolving  Type 2 diabetes mellitus with peripheral angiopathy with gangrene  Pleural effusion, s/p thoracentesis  History of chronic MRSA bacteremia, infective endocarditis-completed IV vancomycin, now on Doxycycline   Hypotension-resolved  ESRD oh hemodialysis  Severe malnutrition-prealbumin of 3.2           Continue Oral vancomycin-treatment for C. difficile-total of 14 days  Oral doxycycline as suppressive therapy for MRSA bacteremia with possible infective endocarditis  Monitor temperature WBC  Local care  Offloading  High-protein diet  Monitor stool  Podiatry follow up  Contact precautions    Stephen Coulter MD

## 2023-12-13 NOTE — PROGRESS NOTES
"Ayanna Gross is a 73 y.o. female on day 7 of admission presenting with Hypotension, unspecified hypotension type.    Subjective   Patient seen at bedside with daughter present. States she is having minimal pain to her heels b/l. Has been wearing her offloading boots while in bed. No other pedal complaints at this time.         Physical Exam    Objective     Vasc: DP and PT pulses are none palpable bilateral, Doppler not available. CFT is delayed.  Skin temperature is warm to cool proximal to distal bilateral. No significant erythema or edema noted.     Neuro:  Light touch is absent to the foot bilateral.  Protective sensation is absent.  There is no clonus noted.  Denies any numbness, burning or tingling     Derm:  Skin is diffusely xerotic with scaling and peeling noted.  Webspaces are clean, dry and intact bilateral. Full thickness ulceration noted to the posterior heel bilaterally. Ulcerations are covered with black necrotic eschar, improved from prior exam.  Mild bogginess noted to the ulceration sites with minimal serous drainage noted.  Mild BALAJI wound erythema.  No tunneling or undermining noted.  Lesion noted to plantar aspect of right heel. majority of hyperkeratotic tissue removed with mild hyperkeratotic tissue remaining.  There appears to be intact dermis beneath.  Still mild serous drainage.  No purulence noted at this time to any of the wounds.  No deep structures noted within the base of any of the wounds.     MSK: Muscle strength is 3/5 for all pedal groups tested.  Ankle joint, subtalar joint, 1st MPJ and lesser MPJ ROM are all significantly decreased. No pain to palpation at feet B/L.  Right foot has amputations of digits 1 through 3 with 1 and 3 being partial amputations.  Left foot has a partial amputation of digit 1.     Last Recorded Vitals  Blood pressure 133/70, pulse 95, temperature 36.5 °C (97.7 °F), temperature source Temporal, resp. rate 18, height 1.575 m (5' 2.01\"), weight 73.9 kg " (163 lb), SpO2 92 %.    Intake/Output last 3 Shifts:  No intake/output data recorded.    Relevant Results  Scheduled medications  apixaban, 2.5 mg, oral, BID  calcium acetate, 667 mg, oral, TID with meals  collagenase, , Topical, Daily  doxycycline, 100 mg, oral, q24h GULSHAN  escitalopram, 10 mg, oral, Daily  febuxostat, 40 mg, oral, Every other day  fenofibrate, 160 mg, oral, Daily  gabapentin, 100 mg, oral, BID  heparin, 2,000 Units, intra-catheter, After Dialysis  heparin, 2,000 Units, intra-catheter, After Dialysis  insulin lispro, 0-5 Units, subcutaneous, TID with meals  metoprolol succinate XL, 12.5 mg, oral, Daily  midodrine, 15 mg, oral, TID with meals  nystatin, 1 Application, Topical, BID  simvastatin, 40 mg, oral, Nightly  vancomycin, 125 mg, oral, 4x daily      Continuous medications     PRN medications  PRN medications: acetaminophen, acetaminophen, albumin human, dextrose 10 % in water (D10W), dextrose, glucagon, polyethylene glycol, zinc oxide     Results for orders placed or performed during the hospital encounter of 12/06/23 (from the past 24 hour(s))   POCT GLUCOSE   Result Value Ref Range    POCT Glucose 124 (H) 74 - 99 mg/dL   POCT GLUCOSE   Result Value Ref Range    POCT Glucose 117 (H) 74 - 99 mg/dL   Basic metabolic panel   Result Value Ref Range    Glucose 139 (H) 65 - 99 mg/dL    Sodium 138 133 - 145 mmol/L    Potassium 3.8 3.4 - 5.1 mmol/L    Chloride 100 97 - 107 mmol/L    Bicarbonate 27 24 - 31 mmol/L    Urea Nitrogen 16 8 - 25 mg/dL    Creatinine 3.40 (H) 0.40 - 1.60 mg/dL    eGFR 14 (L) >60 mL/min/1.73m*2    Calcium 8.7 8.5 - 10.4 mg/dL    Anion Gap 11 <=19 mmol/L   CBC   Result Value Ref Range    WBC 12.3 (H) 4.4 - 11.3 x10*3/uL    nRBC 0.0 0.0 - 0.0 /100 WBCs    RBC 4.00 4.00 - 5.20 x10*6/uL    Hemoglobin 11.3 (L) 12.0 - 16.0 g/dL    Hematocrit 35.3 (L) 36.0 - 46.0 %    MCV 88 80 - 100 fL    MCH 28.3 26.0 - 34.0 pg    MCHC 32.0 32.0 - 36.0 g/dL    RDW 21.7 (H) 11.5 - 14.5 %    Platelets  121 (L) 150 - 450 x10*3/uL   POCT GLUCOSE   Result Value Ref Range    POCT Glucose 142 (H) 74 - 99 mg/dL   POCT GLUCOSE   Result Value Ref Range    POCT Glucose 112 (H) 74 - 99 mg/dL              Assessment/Plan   Principal Problem:    Hypotension, unspecified hypotension type  Active Problems:    Paroxysmal atrial fibrillation (CMS/[facility])    Peripheral vascular disease (CMS/[facility])    Orthostatic hypotension    Mixed hyperlipidemia    End-stage renal disease on hemodialysis (CMS/[facility])    DM (diabetes mellitus) (CMS/[facility])    Absent pedal pulses    Bilateral heel wounds  T2DM  Mild leukocytosis     Plan:  -Patient seen and examined at bedside. All findings discussed with patient.    -Recommend continued IV antibiotics per primary   -PVR/OCTAIVO showing moderate occlusive disease. Patient was seen by vascular. Recommending outpatient management, no plan for intervention this hospital stay.  - Due to vascular status there is no plan for surgical debridement of b/l heel wounds.  - Will continue with EVA AugusteD to b/l lower extremities to be changed daily  -Podiatry will sign off at this time, please reach out with any questions or concerns     Diet: Per primary  DVT ppx: Per primary  Weightbearing: Nonweightbearing BLE except for transfers.  Toe-touch weightbearing for transfers only as needed.  Wound Care: Santyl/medihoney, Adaptic, 4 x 4, ABD, Kvng     Case has been discussed with attending, A&P above reflect tentative plan. Please await final signature from attending physician on service.  Please use Secure Chat if any questions           Josue Tirado DPM

## 2023-12-13 NOTE — PROCEDURES
Bedside report received from FLAQUITO BRUNO  Hemodialysis Treatment to be done @ bedside.  Name and  verified verbally by patient.   Bed weight is 73.9KG  Patient is on 3 Liters O2 via nasal cannula.  Prescribed Hemodialysis orders are verified in the machine.  Hemodialysis orders are for 1.5 Liters of fluid removal.    Patient has a RIGHT SUBCLAVIAN CVC  Dressing is CLEAN DRY AND INTACT.   No visible S/S of infection noted.  Patient denies pain near insertion site.  No need for dressing change today, date on dressing is     CVC accessed with aseptic technique.   Caps removed.  Arterial and venous lumens aspirated without difficulty.  Patency of both lumens checked with 2 (10) ml normal saline flushes.    Treatment initiated @ 0840  Treatment Completed @ 1240    Blood Retransfused. VSS.   Arterial and venous lumens flushed with 10 ml NS.  A Heparin dwell was administered.  1.6 ARTERIAL AND VENOUS LUMEN FILL  Patient appears to be hemodynamically stable.  Patient is in no pain and denies complaints.  Post HD Report called to Bedside FLAQUITO BRUNO         /87   Temp 36.6C   Pulse 93   FLUID REM. 1500 ML

## 2023-12-13 NOTE — CARE PLAN
Problem: Skin  Goal: Decreased wound size/increased tissue granulation at next dressing change  Flowsheets (Taken 12/12/2023 1959)  Decreased wound size/increased tissue granulation at next dressing change: Protective dressings over bony prominences  Goal: Participates in plan/prevention/treatment measures  Flowsheets (Taken 12/12/2023 1959)  Participates in plan/prevention/treatment measures: Elevate heels  Goal: Prevent/manage excess moisture  Outcome: Progressing  Flowsheets (Taken 12/12/2023 1959)  Prevent/manage excess moisture: Moisturize dry skin  Goal: Prevent/minimize sheer/friction injuries  Outcome: Progressing  Flowsheets (Taken 12/12/2023 1959)  Prevent/minimize sheer/friction injuries: Turn/reposition every 2 hours/use positioning/transfer devices  Goal: Promote/optimize nutrition  Outcome: Progressing  Flowsheets (Taken 12/12/2023 1959)  Promote/optimize nutrition: Consume > 50% meals/supplements  Goal: Promote skin healing  Outcome: Progressing  Flowsheets (Taken 12/12/2023 1959)  Promote skin healing: Turn/reposition every 2 hours/use positioning/transfer devices   The patient's goals for the shift include      The clinical goals for the shift include safety    Over the shift, the patient did make progress toward the following goals.

## 2023-12-14 NOTE — PROGRESS NOTES
"Ayanna Gross is a 73 y.o. female on day 8 of admission presenting with Hypotension, unspecified hypotension type.    Subjective   Symptoms (0 - 10, Best to Worst)  [place] Symptom Assessment System  Pain Score: 0 - No pain  Currently denied any pain  Interval worsening of shortness of breath hypoxia increasing oxygen requirements now with a moist productive cough.  Currently getting a stat chest x-ray right now.    Vascular input noted.  Patient may wind up needing angios.    Objective     Physical Exam  Sitting up in bed no shortness of breath and coughing on voice somewhat productive cough getting a stat chest x-ray right now  Neck supple no JVD noted although she is not in a great position to evaluate this it does seem to be midline  Right-sided hemodialysis catheter without signs of infection at insertion site  Rhythm and rate seems to be slightly irregular no obvious murmur gallop or rub but again she is in a poor position for good evaluation  Lungs sound pretty junky rhonchi throughout diminished in the bases bilaterally right greater than left she is significantly right side-lying  Soft abdomen nontender nondistended positive bowel sounds no CVAT  Lower extremities without any significant edema feet warm to cool diminished pedal pulses he will not observed offloading boots present  She has easily palpable calcified vessels in her calf specifically the right lung was more easily palpated in the left.  She is a bit confused but she can move all extremities engaging in conversation but does not seem to be fully capable of making important medical decisions  She is pleasant cooperative no anxiety or agitation noted    Last Recorded Vitals  Blood pressure 82/50, pulse 93, temperature 36.6 °C (97.9 °F), temperature source Oral, resp. rate 25, height 1.575 m (5' 2.01\"), weight 73.9 kg (163 lb), SpO2 95 %.  Intake/Output last 3 Shifts:  I/O last 3 completed shifts:  In: 1600 (21.6 mL/kg) [I.V.:1200 (16.2 mL/kg); " Other:400]  Out: 3800 (51.4 mL/kg) [Other:3800]  Weight: 73.9 kg     Relevant Results  No results found.  Results for orders placed or performed during the hospital encounter of 12/06/23 (from the past 24 hour(s))   POCT GLUCOSE   Result Value Ref Range    POCT Glucose 112 (H) 74 - 99 mg/dL   POCT GLUCOSE   Result Value Ref Range    POCT Glucose 157 (H) 74 - 99 mg/dL   POCT GLUCOSE   Result Value Ref Range    POCT Glucose 113 (H) 74 - 99 mg/dL   Basic metabolic panel   Result Value Ref Range    Glucose 149 (H) 65 - 99 mg/dL    Sodium 134 133 - 145 mmol/L    Potassium 3.2 (L) 3.4 - 5.1 mmol/L    Chloride 97 97 - 107 mmol/L    Bicarbonate 26 24 - 31 mmol/L    Urea Nitrogen 11 8 - 25 mg/dL    Creatinine 2.60 (H) 0.40 - 1.60 mg/dL    eGFR 19 (L) >60 mL/min/1.73m*2    Calcium 8.6 8.5 - 10.4 mg/dL    Anion Gap 11 <=19 mmol/L   CBC   Result Value Ref Range    WBC 17.9 (H) 4.4 - 11.3 x10*3/uL    nRBC 0.0 0.0 - 0.0 /100 WBCs    RBC 4.04 4.00 - 5.20 x10*6/uL    Hemoglobin 11.5 (L) 12.0 - 16.0 g/dL    Hematocrit 34.8 (L) 36.0 - 46.0 %    MCV 86 80 - 100 fL    MCH 28.5 26.0 - 34.0 pg    MCHC 33.0 32.0 - 36.0 g/dL    RDW 21.8 (H) 11.5 - 14.5 %    Platelets 79 (L) 150 - 450 x10*3/uL   POCT GLUCOSE   Result Value Ref Range    POCT Glucose 151 (H) 74 - 99 mg/dL     No results found for the last 90 days.  No results found for the last 90 days.        Assessment/Plan   IMP:    Acute hypoxic respiratory failure  End-stage renal disease on hemodialysis  Hypotension, acute on chronic  Clostridium C. difficile infection  Pleural effusion  History of MRSA bacteremia, infective endocarditis  Bilateral foot wounds  Malnutrition  Palliative care    CODE STATUS is FULL CODE  Patient is not capable, her spouse Hossein is her surrogate decision maker.    Asked to reevaluate by vascular and attending service  Patient again at the time I saw her today does not seem to be fully capable she is awake and alert but is not really adequately able to weigh  risks and benefits of treatment versus nontreatment  Today she is having increased hypoxia and O2 needed to be titrated up, she has a pretty rough sounding cough bilateral rhonchi getting a stat chest x-ray at the moment  Does not really have a ton of edema in the lower extremities.    Overall she does not strike me as a great candidate for any sort of aggressive treatment we have tried multiple times to get in touch with her  to have these conversations to no avail.  Will try again this afternoon hopefully will get a reading on the chest film quickly she may very well be developing pneumonia, recurrent effusion/HF vs other .       10:45 AM  Again unable to get in touch with the patient's  voicemail left to return or call so that we can discuss patient condition and hopefully engage in goals of care conversation.    Nothing further to add at this point waiting to see what the chest x-ray shows, then going from there    Plan is being arranged from care coordination standpoint for possible discharge should she remain stable.    Discussed in detail with Inez Josue CNP, as well with care coordination Irene Patterson.     Total time at this point now over 35 minutes which was not continuous  MADHURI Rangel-CNP

## 2023-12-14 NOTE — PROGRESS NOTES
Speech-Language Pathology    Inpatient Speech-Language Pathology Clinical Swallow Evaluation    Patient Name: Ayanna Gross  MRN: [id number]  Today's Date: 12/14/2023   Time Calculation  Start Time: 1115  Stop Time: 1146  Time Calculation (min): 31 min         Current Problem:   1. Hypotension, unspecified hypotension type        2. History of end stage renal disease        3. Wound infection        4. Absent pedal pulses  XR foot 3+ views bilateral    Vascular US ankle brachial index (OCTAVIO) without exercise    XR foot 3+ views bilateral    Vascular US ankle brachial index (OCTAVIO) without exercise    CANCELED: Vascular US PVR without exercise    CANCELED: Vascular US PVR without exercise      Ayanna Gross is a 73 y.o. female presenting with low blood pressure. States she was sent from dialysis because blood pressure has been low. Denies chest pain, shortness of breath, abdominal pain, lightheadedness, near syncope, fevers, chills, recent illness.          Recommendations:  Additional Recommendations: Dysphagia treatment  Solid Diet Recommendations : Regular (IDDSI Level 7)  Liquid Diet Recommendations: Thin (IDDSI Level 0)  Compensatory Swallowing Strategies: Upright 90 degrees as possible for all oral intake, Single sips, Small bites/sips, Eat/feed slowly  Medication Administration Recommendations: Whole, With Liquid, With Pureed  Follow up treatments: Diet tolerance monitoring, Patient/family education    Assessment:  Treatment Provided: No  Medical Staff Made Aware: Yes  Barriers: Cognition      Plan:  Inpatient/Swing Bed or Outpatient: Inpatient  Treatment/Interventions: Diet recommendations  SLP Plan: Skilled SLP  SLP Frequency: 1x per week  Duration: Current admission  SLP Discharge Recommendations:  (TBD)  Diet Recommendations: Solid  Solid Consistency: Regular (IDDSI Level 7)  Liquid Consistency: Thin (IDDSI Level 0)  Next Treatment Priority: diet tolerance,safe swallow strategies  Discussed POC: Patient,  Nursing  Discussed Risks/Benefits: Yes, Patient, Nursing  Patient/Caregiver Agreeable: Yes    Dysphagia Goals: Patient will tolerate recommended diet without observed clinical signs of aspiration, Family will demonstrate appropriate strategies for swallowing safety    Subjective   Current Problem:  Pt reports no h/o dysphagia , oriented to self,place, month but confused, c/o intermittent nausea, has baseline congseted, non productive cough      General Visit Information:  Reason for Referral: concenr for aspiration,wet congested cough  Past Medical History Relevant to Rehab:  end-stage renal disease chronic hypotension heart failure diabetes as well as a multitude of other chronic conditions.    Prior to Session Communication: Bedside nurse (leydi HUDDLESTON)  Date of Onset: 12/06/23  Date of Order: 12/14/23  [employer] Diet: regular solids and thin liquids  Current Diet : NPO     Pain:  Pain Assessment: 0-10  Pain Score: 0 - No pain     Objective   Baseline Assessment:  Respiratory Status: Oxygen via nasal cannula  Behavior/Cognition: Alert, Cooperative, Confused  Vision: Functional for self-feeding  Patient Positioning: Upright in Bed  Baseline Vocal Quality: Dysphonic  Volitional Cough: Strong  Oral/Motor Assessment:  Dentition: Adequate/Natural  Oral Motor: Within Functional Limits  Consistencies Trialed:  Consistencies Trialed: Yes, 4 oz thin liquids via cup and straw, 2 oz applesauce,one marshall cracker  Clinical Observations: Pt demon adequate oral manipulation/mastication with regular solid, adequate oral clearance, swallow onset timely, laryngeal elevation present upon palpation, no overt s/s aspiration          Inpatient:  Education Documentation  Pt/nursing education provided re: regarding role of ST, purpose of assessment/treatment, clinical impressions, recommendations, POC,safe swallow strategies, Pt verbalized understanding and agreement/ pt requires reinforcement/assisstance

## 2023-12-14 NOTE — INDIVIDUALIZED OVERALL PLAN OF CARE NOTE
11:37 AM  Discussed with the patient's  Hossein 12-minute ACP conversation in addition to the over 35 minutes already spent, time now stands at 47 minutes.    I reviewed the stat chest x-ray myself it looks like there is increasing infiltrate and/or effusion in the left lung may very well have pneumonia developing with this elevated white count she also is on treatment for C. difficile however the white count has been relatively stable she now has worsening oxygenation and hypoxic respiratory failure would favor treating for pneumonia.    Already on doxycycline for suppression due to her history of MRSA endocarditis  Probably need to have ID evaluate but may very well need negative coverage as well.    Updated Zeinab Josue CNP.    Total time now 55 minutes which has not been continuous limiting the ACP time as this does not meet the threshold bill individually.     DNR CCA/DNI updated in epic state of [place] form filled out and signed.    No active medicine will stay following intermittently goals of care are well-established as aggressive and disease-modifying now within the confines of the newly specified DNR.

## 2023-12-14 NOTE — PROGRESS NOTES
Ayanna Gross is a 73 y.o. female on day 8 of admission presenting with Hypotension, unspecified hypotension type.      Subjective   Patient seen and examined. Drowsy/oriented times 2-3. Denies chest pain, shortness of breath, nausea, or vomiting. No abdominal discomfort. She does have a moist cough. Increased oxygen demand overnight with also some confusion. Will check portable CXR.            Objective     Last Recorded Vitals  BP 82/50 (BP Location: Right leg, Patient Position: Lying)   Pulse 93   Temp 36.6 °C (97.9 °F) (Oral)   Resp 25   Wt 73.9 kg (163 lb)   SpO2 95%   Intake/Output last 3 Shifts:    Intake/Output Summary (Last 24 hours) at 12/14/2023 0959  Last data filed at 12/13/2023 1457  Gross per 24 hour   Intake 1000 ml   Output 3800 ml   Net -2800 ml         Admission Weight  Weight: 72.6 kg (160 lb) (12/06/23 1301)    Daily Weight  12/10/23 : 73.9 kg (163 lb)    Image Results  Vascular US ankle brachial index (OCTAVIO) without [place] [address]             Phone [phone]       Vascular Lab Report     [facility] US ANKLE BRACHIAL INDEX (OCTAVIO) WITHOUT EXERCISE    Patient Name:      AYANNA GROSS      Reading Physician: [id number] Lizet Messer MD  Study Date:        12/11/2023          Ordering Provider: [id number] MARIANN SUTTON  MRN/PID:           [id number]            Fellow:  Accession#:        [id number]        Technologist:      Luz Maria Trivedi RVT  Date of Birth/Age: 1950 / 73 years Technologist 2:    Dara Parr RVT  Gender:            F                   Encounter#:        [id number]  Admission Status:  Inpatient           Location           [facility]                                         Performed:       Diagnosis/ICD: Other specified symptoms and signs involving the circulatory and                 respiratory systems-R09.89  Indication:    Peripheral vascular  disease  CPT Codes:     75846.52 Peripheral artery OCTAVIO Only Reduced Service       Pertinent History: Absent pedal pulses. Patient is on dialysis.       CONCLUSIONS:  Right Lower PVR: Evidence of moderate arterial occlusive disease in the right lower extremity at rest. Right pressures of >220 mmHg suggest no compressibility of vessels and may make absolute Segmental Limb Pressures (SLP) unreliable. Decreased digital perfusion noted. Biphasic flow is noted in the right dorsalis pedis artery.  Left Lower PVR: Evidence of moderate arterial occlusive disease in the left lower extremity at rest. Left pressures of >220 mmHg suggest no compressibility of vessels and may make absolute Segmental Limb Pressures (SLP) unreliable. Decreased digital perfusion noted. Biphasic flow is noted in the left dorsalis pedis artery.  Additional Findings:  Technically difficult and limited exam due to patient's positioning, movement  and inability to cooperate with exam.       Imaging & Doppler Findings:     RIGHT Lower PVR              Pressures  Right Dorsalis Pedis (Ankle) 255 mmHg          LEFT Lower PVR              Pressures  Left Dorsalis Pedis (Ankle) 255 mmHg          [id number] Lizet Messer MD  Electronically signed by [id number] Lizet Messer MD on 12/13/2023 at 8:38:56 AM       ** Final **      Physical Exam  Vitals reviewed.   Constitutional:       Comments: Poor historian   HENT:      Head: Normocephalic and atraumatic.   Eyes:      Extraocular Movements: Extraocular movements intact.      Conjunctiva/sclera: Conjunctivae normal.   Cardiovascular:      Rate and Rhythm: Normal rate and regular rhythm.   Pulmonary:      Effort: Pulmonary effort is normal.      Breath sounds: Rhonchi present. No wheezing or rales.      Comments: Breath sounds diminished bilaterally  Abdominal:      General: Bowel sounds are normal.      Palpations: Abdomen is soft.      Tenderness: There is no abdominal tenderness.   Skin:     General: Skin is warm and  dry.      Comments: Dressing to BLE CDI   Neurological:      General: No focal deficit present.      Mental Status: She is alert and oriented to person, place, and time.         Relevant Results  Lab Results   Component Value Date    GLUCOSE 149 (H) 12/14/2023    CALCIUM 8.6 12/14/2023     12/14/2023    K 3.2 (L) 12/14/2023    CO2 26 12/14/2023    CL 97 12/14/2023    BUN 11 12/14/2023    CREATININE 2.60 (H) 12/14/2023     Lab Results   Component Value Date    WBC 17.9 (H) 12/14/2023    HGB 11.5 (L) 12/14/2023    HCT 34.8 (L) 12/14/2023    MCV 86 12/14/2023    PLT 79 (L) 12/14/2023     US thoracentesis  Result Date: 12/8/2023  FINDINGS: A total of 750 cc of clear yellow fluid was aspirated. A sample was sent for analysis. The patient tolerated the procedure well.     Ultrasound-guided left thoracentesis.     Signed by: Sandro Hernandez 12/8/2023 3:01 PM     XR chest 1 view  Result Date: 12/8/2023  Decreased size of left effusion following ultrasound-guided thoracentesis with no pneumothorax.   There is some residual left pleural effusion noted with infiltrate in the left mid and lower lung field.   Signed by: Maria Garcia 12/8/2023 12:51 PM Dictation workstation:   [id number]    XR foot 3+ views bilateral  Result Date: 12/7/2023  1. Right foot: Large heel spur. Atherosclerotic disease. Suggestion of diffuse osteopenia. No sclerosis to suggest osteomyelitis. No subcutaneous air to suggest gangrene. Absent flanges 2nd ray with difficult to visualized distal phalanges of the other digits.   2. Left foot: Large heel spur. Degenerative changes of the hindfoot midfoot and forefoot with atherosclerotic disease. Absent distal phalanx of the great toe. No periostitis or abnormal sclerosis with diffuse osteopenia to suggest osteomyelitis. No subcutaneous air to suggest gangrene.           ECG 12 Lead  Result Date: 12/7/2023  Poor data quality, interpretation may be adversely affected Normal sinus rhythm Right bundle branch  block Septal infarct , age undetermined Possible Lateral infarct , age undetermined Abnormal ECG No previous ECGs available     XR chest 1 view  New, moderate left pleural effusion.            Assessment/Plan      Principal Problem:    Hypotension, unspecified hypotension type  Active Problems:    Paroxysmal atrial fibrillation (CMS/[facility])    Peripheral vascular disease (CMS/[facility])    Orthostatic hypotension    Mixed hyperlipidemia    End-stage renal disease on hemodialysis (CMS/[facility])    DM (diabetes mellitus) (CMS/[facility])    Absent pedal pulses    Acute hypoxic respiratory failure  Suspect secondary to aspiration pneumonia  STAT CXR showed increasing density in the left mid and lower lung suggests increasing infiltrate or pleural effusion.  STAT ABG, reviewed with attending  Leukocytosis  Moist cough  Discussed with ID, start IV Zosyn, continue doxy emperically and oral vanc for C diff  NPO, consult speech for swallow eval  Supplemental oxygen  Duonebs    Bilateral heel wounds   ID and podiatry following, appreciate recs  Wound care consult   doxycycline  Blood cultures negative, final  Bilateral boots to protect heels from pressure   Podiatry recommendations, santyl/medihone, adaptic, 4x4, ABD, kerlix to change daily, no surgical debridement recommended due to vascular status    Metabolic encephalopathy-improved  Unsure if this is her baseline, tech says she waxes and wanes  CT head and ABG unremarkable  Improved    End-stage renal disease on hemodialysis   Consult Dr. Mondragon - appreciate recs  Continue Phoslo   HD per nephrology  Cleared by nephrology for discharge    Hypotension/orthostatic hypotension  Improved, monitor close  Continue midodrine   Check orthostatic vitals  Continue low-dose Toprol in am with hold parameters  Hold Imdur   Left arm fistula and history of right sided mastectomy. BP different in upper extremities than lower extremities.   Hypotension improved with albumin    Fall precautions    Stable    Peripheral vascular disease   vascular studies of lower extremities results as above  Consult vascular surgery, appreciate recommendations  Vascular recommending palliative to follow up on goals of care, may require angiogram as outpatient if she improves, currently not surgical candidate     Paroxysmal atrial fibrillation  Continue low-dose beta blocker   Eliquis for stroke prophylaxis     Thrombocytopenia  PLT decreased to 79, monitor close, consider hem/onc if no improvement  No s/s of bleeding    Mixed hyperlipidemia  Continue statin, fenofibrate     DM (diabetes mellitus)   Hold Tradjenta   AC/HS glucose with SSI coverage   Hypoglycemia protocol     Groin excoriation   Wound care consult   Nystatin powder   Keep skin clean and dry   Sacrum covered with Mepilex.     C-diff   PCR positive and negative toxin.   Vancomycin 125 mg 4 times daily   ID consult - appreciate recs   Loose stools improved           Malnutrition Diagnosis Status: Ongoing  Malnutrition Diagnosis: Moderate malnutrition related to acute disease or injury  As Evidenced by: 1-2% unintentional wt loss in 1 week (~4% in 2 weeks), Poor intake <75% of estimated energy requirement > 7days, and mild fat and muscle deficits  I agree with the dietitian's malnutrition diagnosis.    Plan  HD per nephrology  Pleural fluid showed moderate polymorphonuclear leukocytes, no organisms seen  Blood culture negative  Continue doxcycline as suppressive therapy for MRSA bacteremia with possible infective endocarditis  Oral vancomycin for a total of 14 days  Maintain precautions  CBC and BMP in AM  PT/OT  Podiatry signed off, wound care recommendations as above  Per vascular, she is not a candidate for surgery  Started on IV Zosyn for possible aspiration PNA  NPO, swallow eval per speech  Seen by palliative, patient is now a DNRCCA, no intubation  Rectal tube ordered per wound care recommendations to facilitate healing to excoriation to  buttocks  Discharge planning to SNF when medically stable         Inez Josue, APRN-CNP

## 2023-12-14 NOTE — NURSING NOTE
Assumed care of patient, pt on 2L NC , pt sleeping no sign of discomfort, bed low and locked call light within reach , pt on isolation for cdiff

## 2023-12-14 NOTE — PROGRESS NOTES
Ayanna Gross is a 73 y.o. female on day 8 of admission presenting with Hypotension, unspecified hypotension type.    Precert received for pt to transfer to [facility]. Pt may be clear tomorrow for discharge     1450: patient has fecal management system, [facility] and [facility] to [facility] with [facility]    Irene Patterson RN

## 2023-12-14 NOTE — PROGRESS NOTES
Wound Care Progress Note     Visit Date: 12/14/2023      Patient Name: Ayanna Gross         MRN: [id number]              Wound History: Present on admission. Bilateral heel ulcers being followed and managed by Podiatry. Large yeast rash to Bilateral groin, periarea, Bilateral buttock and upper thighs. Patient +C-diff and having active diarrhea. Patient was being treated by [facility] for Diabetic heel ulcers.         A 73 y.o. year old female admitted for Principal Problem:    Hypotension, unspecified hypotension type  Active Problems:    Paroxysmal atrial fibrillation (CMS/[facility])    Peripheral vascular disease (CMS/[facility])    Orthostatic hypotension    Mixed hyperlipidemia    End-stage renal disease on hemodialysis (CMS/[facility])    DM (diabetes mellitus) (CMS/[facility])    Absent pedal pulses      Past Medical History:   Diagnosis Date    Asthma     CAD (coronary artery disease)     CHF (congestive heart failure) (CMS/[facility])     Chronic kidney disease on chronic dialysis (CMS/[facility])     Hypertension     Personal history of diseases of the blood and blood-forming organs and certain disorders involving the immune mechanism     History of autoimmune disorder    Sleep apnea     Type 2 diabetes mellitus without complications (CMS/[facility]) 09/15/2022    Diabetes mellitus      Past Surgical History:   Procedure Laterality Date    CARPAL TUNNEL RELEASE  11/18/2013    Neuroplasty Decompression Median Nerve At Carpal Tunnel    HAND SURGERY  11/18/2013    Hand Surgery                                                                                                                                                          MASTECTOMY, PARTIAL  04/10/2014    Right Breast Partial Mastectomy    MR HEAD ANGIO WO IV CONTRAST  8/29/2023    MR HEAD ANGIO WO IV CONTRAST [facility]    OTHER SURGICAL HISTORY  11/18/2013    Breast Reconstruction With Implant Prosthesis    OTHER SURGICAL HISTORY  11/18/2013    Thyroid Surgery Substernal  Thyroidectomy Partial    OTHER SURGICAL HISTORY  11/18/2013    Modified Radical Mastectomy Left Breast    OTHER SURGICAL HISTORY  04/04/2022    Carpal tunnel surgery    OTHER SURGICAL HISTORY  04/04/2022    Foot surgery    OTHER SURGICAL HISTORY  04/04/2022    Hernia repair    SENTINEL LYMPH NODE BIOPSY  04/10/2014    [place] Lymph Node Biopsy    TONSILLECTOMY  11/18/2013    Tonsillectomy    UMBILICAL HERNIA REPAIR  11/18/2013    Umbilical Hernia Repair    US GUIDED PERCUTANEOUS PLACEMENT  6/29/2023    US GUIDED PERCUTANEOUS PLACEMENT [facility]       Scheduled medications  apixaban, 2.5 mg, oral, BID  calcium acetate, 667 mg, oral, TID with meals  collagenase, , Topical, Daily  doxycycline, 100 mg, oral, q24h GULSHAN  escitalopram, 10 mg, oral, Daily  febuxostat, 40 mg, oral, Every other day  fenofibrate, 160 mg, oral, Daily  gabapentin, 100 mg, oral, BID  heparin, 2,000 Units, intra-catheter, After Dialysis  heparin, 2,000 Units, intra-catheter, After Dialysis  insulin lispro, 0-5 Units, subcutaneous, TID with meals  metoprolol succinate XL, 12.5 mg, oral, Daily  midodrine, 15 mg, oral, TID with meals  nystatin, 1 Application, Topical, BID  simvastatin, 40 mg, oral, Nightly  vancomycin, 125 mg, oral, 4x daily      Continuous medications     PRN medications  PRN medications: acetaminophen, acetaminophen, albumin human, dextrose 10 % in water (D10W), dextrose, glucagon, polyethylene glycol, zinc oxide    No Known Allergies      Pertinent Labs:   [place], Urine   Date Value Ref Range Status   02/24/2019 1,315 (H) 0 - 23 MG/L Final     Comment:     RESULT CHECKED     Albumin   Date Value Ref Range Status   12/07/2023 2.2 (L) 3.5 - 5.0 g/dL Final     Albumin, SPE   Date Value Ref Range Status   02/26/2019 3.22  Reference range: 3.37 to 4.23  Unit: gm/dL   (L)  Final     Albumin/Protein Total, Ur   Date Value Ref Range Status   02/24/2019 77.5  Unit: %    Final         Wound 12/06/23 Diabetic Ulcer Heel Left  (Active)   Date First Assessed/Time First Assessed: 12/06/23 2200   Present on Original Admission: Yes  Hand Hygiene Completed: Yes  Primary Wound Type: Diabetic Ulcer  Location: Heel  Wound Location Orientation: Left   Number of days: 7       Wound 12/06/23 Diabetic Ulcer Heel Right (Active)   Date First Assessed/Time First Assessed: 12/06/23 2200   Present on Original Admission: Yes  Hand Hygiene Completed: Yes  Primary Wound Type: Diabetic Ulcer  Location: Heel  Wound Location Orientation: Right   Number of days: 7       Wound 12/06/23 Buttocks Bilateral (Active)   Date First Assessed/Time First Assessed: 12/06/23 2200   Present on Original Admission: Yes  Hand Hygiene Completed: Yes  Location: Buttocks  Wound Location Orientation: Bilateral   Number of days: 7       Wound 12/06/23 Other (comment) Groin Bilateral (Active)   Date First Assessed/Time First Assessed: 12/06/23 2200   Present on Original Admission: Yes  Primary Wound Type: (c) Other (comment)  Location: (c) Groin  Wound Location Orientation: Bilateral   Number of days: 7       Wound 12/13/23 Knee Dorsal;Left (Active)   Date First Assessed/Time First Assessed: 12/13/23 1410   Location: Knee  Wound Location Orientation: Dorsal;Left   Number of days: 0       Wound 12/13/23 Leg Distal;Dorsal;Right;Upper (Active)   Date First Assessed/Time First Assessed: 12/13/23 1410   Location: Leg  Wound Location Orientation: Distal;Dorsal;Right;Upper   Number of days: 0       Wound 12/13/23 Head Dorsal;Left (Active)   Date First Assessed/Time First Assessed: 12/13/23 1418   Location: Head  Wound Location Orientation: Dorsal;Left   Number of days: 0     Wound 12/06/23 Diabetic Ulcer Heel Left (Active)   Wound Image   12/07/23 1100   Site Assessment Yellow 12/12/23 1500   Judy-Wound Assessment Maceration 12/12/23 1500   State of Healing Non-healing 12/12/23 1500   Treatments Other (Comment) 12/11/23 2130   Drainage Description None 12/11/23 2130   Drainage Amount Unable  to assess 12/11/23 2130   Dressing Kerlix/rolled gauze 12/12/23 2015   Dressing Changed Changed 12/12/23 1500   Dressing Status Clean;Dry 12/12/23 2015   Dressing Status Clean;Dry;Intact 12/13/23 2000       Wound 12/06/23 Diabetic Ulcer Heel Right (Active)   Wound Image    12/07/23 1100   Site Assessment Yellow 12/12/23 1500   Judy-Wound Assessment Clean;Dry;Intact 12/11/23 2130   Drainage Description Unable to assess 12/12/23 2001   Drainage Amount Unable to assess 12/12/23 2001   Dressing Kerlix/rolled gauze;Gauze;Non adherent 12/12/23 1500   Dressing Changed Changed 12/12/23 1500   Dressing Status Clean;Dry 12/12/23 2001   Dressing Status Clean;Dry;Intact 12/13/23 2000       Wound 12/06/23 Buttocks Bilateral (Active)   Wound Image    12/13/23 1403   Site Assessment Red 12/11/23 1100   Drainage Description Serosanguineous 12/12/23 2001   Drainage Amount Scant 12/12/23 2001   Dressing Foam 12/12/23 2001   Dressing Changed New 12/12/23 2001   Dressing Status Clean;Dry;Occlusive 12/12/23 2001   Dressing Status Clean;Dry;Intact 12/13/23 2000       Wound 12/06/23 Other (comment) Groin Bilateral (Active)   Wound Image    12/07/23 1322   Site Assessment Excoriated;Red 12/13/23 2000   Drainage Description None 12/12/23 2015   Drainage Amount None 12/12/23 2015   Dressing Open to air 12/13/23 2000   Dressing Changed Other (Comment) 12/12/23 2015   Dressing Status Other (Comment) 12/11/23 2130       Wound 12/13/23 Knee Dorsal;Left (Active)       Wound 12/13/23 Leg Distal;Dorsal;Right;Upper (Active)   Wound Image   12/13/23 1410       Wound 12/13/23 Head Dorsal;Left (Active)   Wound Image   12/13/23 1418       Received a call from Vascular in regards to patient's groin and periarea redness and pain. Patient is still having diarrhea from C-diff. ET nurse spoke to Sherron Evans RN about Fecal management System to help heal skin damage. She is messaging Inez Josue CNP for order.     Sherron Evans RN, updated, to  continue pressure injury prevention interventions, Woundcare, and nursing to continue to follow providers orders. Reconsult Wound RN PRN. Diamond Argueta BSN RN       Wound Team Plan: Buttock- cleanse with soap and water, pat dry. Apply Medihoney to open areas, Mixture of Nystatin powder and Calazime to surrounding areas and cover with DCD daily and prn.      Diamond Argueta RN  12/14/2023  10:05 AM

## 2023-12-14 NOTE — PROGRESS NOTES
"Ayanna Gross is a 73 y.o. female on day 8 of admission presenting with Hypotension, unspecified hypotension type.    Subjective   Interval History:   Interval events noted per primary team  Afebrile  Reported to have been confused   interval discussion with primary team-possible aspiration    Review of Systems    Objective   Range of Vitals (last 24 hours)  Heart Rate:  []   Temp:  [36.4 °C (97.5 °F)-36.8 °C (98.2 °F)]   Resp:  [18-25]   BP: ()/(52-92)   Height:  [157.5 cm (5' 2.01\")]   SpO2:  [95 %-100 %]   Daily Weight  12/10/23 : 73.9 kg (163 lb)    Body mass index is 29.81 kg/m².    Physical Exam  Constitutional:       Appearance: Normal appearance.  Alert but confused  HENT:      Head: Normocephalic and atraumatic.      Nose: Nose normal.      Mouth/Throat:      Mouth: Mucous membranes are moist.      Pharynx: Oropharynx is clear.   Eyes:      Extraocular Movements: Extraocular movements intact.      Conjunctiva/sclera: Conjunctivae normal.   Cardiovascular:      Rate and Rhythm: Normal rate and regular rhythm.   Pulmonary:      Effort: Pulmonary effort is normal.      Breath sounds:  decreased breath sounds bilaterally  Abdominal:      General: Bowel sounds are normal.      Palpations: Abdomen is soft.   Musculoskeletal:         General: Normal range of motion.      Cervical back: Normal range of motion and neck supple.      Right lower leg: Edema present.      Left lower leg: Edema present.   Skin:     Comments:  mild groin fold erythema  LUIS fistula    Antibiotics  sodium chloride 0.9 % bolus 500 mL  cefepime (Maxipime) 1 g in dextrose 5 % 50 mL IV  vancomycin-diluent combo no.1 (Xellia) IVPB 1 g  apixaban (Eliquis) tablet 2.5 mg  calcium acetate (Phoslo) capsule 2,000 mg  escitalopram (Lexapro) tablet 10 mg  febuxostat (Uloric) tablet 40 mg  fenofibrate (Triglide) tablet 160 mg  gabapentin (Neurontin) capsule 300 mg  metoprolol succinate XL (Toprol-XL) 24 hr tablet 12.5 mg  midodrine " (Proamatine) tablet 10 mg  nystatin (Mycostatin) 100,000 unit/gram powder 1 Application  B complex-vitamin C tablet 1 tablet  simvastatin (Zocor) tablet 40 mg  acetaminophen (Tylenol) tablet 650 mg  acetaminophen (Tylenol) tablet 650 mg  polyethylene glycol (Glycolax, Miralax) packet 17 g  dextrose 50 % injection 25 g  glucagon (Glucagen) injection 1 mg  dextrose 10 % in water (D10W) infusion  insulin lispro (HumaLOG) injection 0-5 Units  zinc oxide 20 % ointment 1 Application  calcium acetate (Phoslo) capsule 667 mg  vancomycin (Vancocin) capsule 125 mg  lidocaine PF (Xylocaine) 10 mg/mL (1 %) injection  heparin 1,000 unit/mL injection 2,000 Units  heparin 1,000 unit/mL injection 2,000 Units  midodrine (Proamatine) tablet 15 mg  albumin human 25 % solution 25 g  polyethylene glycol (Glycolax, Miralax) packet 17 g  collagenase 250 unit/gram ointment  vancomycin (Vancocin) 1,750 mg in dextrose 5 % in water (D5W) 250 mL IV  vancomycin-diluent combo no.1 (Xellia) IVPB 1,750 mg  albumin human 25 % solution 12.5 g  heparin 1,000 unit/mL injection 2,000 Units  heparin 1,000 unit/mL injection 2,000 Units  vancomycin (Vancocin) placeholder  doxycycline (Vibramycin) capsule 100 mg  potassium chloride CR (Klor-Con) ER tablet 10 mEq  gabapentin (Neurontin) capsule 100 mg  heparin 1,000 unit/mL injection 2,000 Units  heparin 1,000 unit/mL injection 2,000 Units      Relevant Results  Labs  Results from last 72 hours   Lab Units 12/13/23 0457 12/12/23 0446   WBC AUTO x10*3/uL 12.3* 9.5   HEMOGLOBIN g/dL 11.3* 9.5*   HEMATOCRIT % 35.3* 29.3*   PLATELETS AUTO x10*3/uL 121* 100*     Results from last 72 hours   Lab Units 12/14/23 0454 12/13/23 0457 12/12/23 0446   SODIUM mmol/L 134 138 136   POTASSIUM mmol/L 3.2* 3.8 3.3*   CHLORIDE mmol/L 97 100 101   CO2 mmol/L 26 27 27   BUN mg/dL 11 16 10   CREATININE mg/dL 2.60* 3.40* 2.60*   GLUCOSE mg/dL 149* 139* 101*   CALCIUM mg/dL 8.6 8.7 8.3*   ANION GAP mmol/L 11 11 8   EGFR  mL/min/1.73m*2 19* 14* 19*         Estimated Creatinine Clearance: 18.7 mL/min (A) (by C-G formula based on SCr of 2.6 mg/dL (H)).  CRP   Date Value Ref Range Status   06/23/2023 19.9 (H) 0 - 2.0 MG/DL Final     Comment:     Performed at [place] [id number]   05/22/2022 1.0 0 - 2.0 MG/DL Final     Comment:     Performed at [place] [address]   12/13/2020 10.1 (H) 0 - 2.0 MG/DL Final     Comment:     Performed at [place] [address]     Microbiology   reviewed-nasal MRSA PCR pending, interval positive coronavirus PCR  Imaging  XR chest 1 view    Result Date: 12/14/2023  Interpreted By:  Judd Lee, STUDY: XR CHEST 1 VIEW; 12/14/2023 10:43 am   INDICATION: Hypoxic respiratory failure.   COMPARISON: December 8, 2023   ACCESSION NUMBER(S): [id number]   ORDERING CLINICIAN: RODRI SURESH   FINDINGS: RESULT: Right-sided double-lumen dialysis catheter is noted with distal tip at the level of the right atrium. Vascular stents are noted in the left subclavian region. Surgical clips are noted in the left axilla. Cardiac silhouette size is indeterminate as the left heart border is obscured. There are calcifications involving the thoracic aorta. Left basilar obscurity obscures the left hemidiaphragm and left heart border with hazy opacity extending to the left mid lung. There are degenerative changes of the bilateral shoulders.       Increasing density in the left mid and lower lung suggests increasing infiltrate or pleural effusion.     Signed by: Judd Lee 12/14/2023 10:58 AM Dictation workstation:   [id number]    Vascular US ankle brachial index (OCTAVIO) without exercise    Result Date: 12/13/2023           [place] [address]            Phone [phone]  Vascular Lab Report  [facility] US ANKLE BRACHIAL INDEX (OCTAVIO) WITHOUT EXERCISE Patient Name:      BASIA Gray  Physician: [id number] Messer MD Study Date:        12/11/2023          Ordering Provider: [id number] MARIANN SUTTON MRN/PID:           [id number]            Fellow: Accession#:        [id number]        Technologist:      Luz Maria Trivedi RVT Date of Birth/Age: 1950 / 73 years Technologist 2:    Dara Parr RVT Gender:            F                   Encounter#:        [id number] Admission Status:  Inpatient           Location           [facility]                                        Performed:  Diagnosis/ICD: Other specified symptoms and signs involving the circulatory and                respiratory systems-R09.89 Indication:    Peripheral vascular disease CPT Codes:     29977.52 Peripheral artery OCTAVIO Only Reduced Service  Pertinent History: Absent pedal pulses. Patient is on dialysis.  CONCLUSIONS: Right Lower PVR: Evidence of moderate arterial occlusive disease in the right lower extremity at rest. Right pressures of >220 mmHg suggest no compressibility of vessels and may make absolute Segmental Limb Pressures (SLP) unreliable. Decreased digital perfusion noted. Biphasic flow is noted in the right dorsalis pedis artery. Left Lower PVR: Evidence of moderate arterial occlusive disease in the left lower extremity at rest. Left pressures of >220 mmHg suggest no compressibility of vessels and may make absolute Segmental Limb Pressures (SLP) unreliable. Decreased digital perfusion noted. Biphasic flow is noted in the left dorsalis pedis artery. Additional Findings: Technically difficult and limited exam due to patient's positioning, movement and inability to cooperate with exam.  Imaging & Doppler Findings:  RIGHT Lower PVR              Pressures Right Dorsalis Pedis (Ankle) 255 mmHg   LEFT Lower PVR              Pressures Left Dorsalis Pedis (Ankle) 255 mmHg   [id number] Lizet Messer MD Electronically signed by [id number] Messer MD on 12/13/2023 at  8:38:56 AM  ** Final **     CT head wo IV contrast    Result Date: 12/11/2023  Interpreted By:  Brendon Hook, STUDY: CT HEAD WO IV CONTRAST; 12/11/2023 5:20 pm   INDICATION: Signs/Symptoms:acute confusion.   COMPARISON: 20 August 2023   ACCESSION NUMBER(S): [id number]   ORDERING CLINICIAN: RODRI SURESH   TECHNIQUE: CT was performed with one or more of the following dose reduction techniques: automated exposure control, adjustment of the mA and/or kV according to patient size, or use of iterative reconstruction technique.       PROCEDURE: 3.0 mm axial images were obtained through the brain, to include the posterior fossa without intravenous contrast enhancement.   FINDINGS: The patient's head is turned to the left and slightly canted in the gantry. Mild motion artifact is seen. There is symmetric volume loss of the cerebral hemispheres. Moderate  decreased attenuation in the bilateral periventricular white matter, consistent with microangiopathy is noted.  There is no encephalomalacic change. Brainstem is unremarkable. Cerebellar hemispheres are symmetric. No subarachnoid, intraparenchymal, subdural or interventricular hemorrhage. No intra- or extra-axial mass or abnormal blood products are demonstrated. Hyperostosis frontalis interna is seen. Slight hypoplasia of the left mastoid.   The paranasal sinuses and mastoids as well as calvarium are unremarkable.       1. No acute intracranial findings. 2. Symmetric volume loss of the cerebral hemispheres. 3. Periventricular Microangiopathy. 4. No posttraumatic abnormality.   This report has been produced using speech recognition. This exam is available in DICOM format to non-affiliated healthcare facilities on a secure media free searchable basis with prior patient authorization. The patient exposure is reported to a radiation dose index registry. All CT examinations are performed with one or more of the following dose reduction techniques: Automated Exposure Control,  Adjustment of mA and/or KV according to patient size, or use of iterative reconstruction techniques.   MACRO: None   Signed by: Brendon Hook 12/11/2023 7:05 PM Dictation workstation:   [id number]    US thoracentesis    Result Date: 12/8/2023  Interpreted By:  Sandro Hernandez, STUDY: US THORACENTESIS; 12/7/2023 3:16 pm   INDICATION: Signs/Symptoms:Left pleural effusion question of whether this needs thoracentesis, history of ESRD, just had dialysis today.   COMPARISON: None   ACCESSION NUMBER(S): [id number]   ORDERING CLINICIAN: JEZ LONG   TECHNIQUE: Informed consent obtained. Patient positionedsitting upright. Skin prepped, draped and anesthetized. Under ultrasound guidance, a centesis catheter/needle was advanced into rightpleural cavity.   FINDINGS: A total of 750 cc of clear yellow fluid was aspirated. A sample was sent for analysis. The patient tolerated the procedure well.       Ultrasound-guided left thoracentesis.     Signed by: Sandro Hernandez 12/8/2023 3:01 PM Dictation workstation:   [id number]    XR chest 1 view    Result Date: 12/8/2023  Interpreted By:  Maria Garcia, STUDY: XR CHEST 1 VIEW 12/8/2023 11:57 am   INDICATION: Signs/Symptoms:S/P thoracentesis   COMPARISON: 12/06/2023   ACCESSION NUMBER(S): [id number]   ORDERING CLINICIAN: JEZ LONG   TECHNIQUE: AP erect view of the chest at bedside   FINDINGS: There is an interval decrease in size of left pleural effusion when compared with the study done 2 days earlier. No pneumothorax is seen.   There is some residual left pleural effusion with infiltrates seen in the left mid and lower lung field. The right lung is clear.   There are surgical clips within the left axilla. Stent grafts are visible in the left subclavian region extending into the left axillary region. A tunneled right jugular dialysis catheter terminates within right atrium.       Decreased size of left effusion following ultrasound-guided thoracentesis with no  pneumothorax.   There is some residual left pleural effusion noted with infiltrate in the left mid and lower lung field.   Signed by: Maria Garcia 12/8/2023 12:51 PM Dictation workstation:   [id number]    XR foot 3+ views bilateral    Result Date: 12/7/2023  Interpreted By:  Brendon Hook, STUDY: XR FOOT 3+ VIEWS BILATERAL; 12/7/2023 3:31 pm   INDICATION: Signs/Symptoms:Wound/Abscess/Acute neha process rule out/Comparative Exam.   COMPARISON: None   ACCESSION NUMBER(S): [id number]   ORDERING CLINICIAN: JOSSELYN STAHL   TECHNIQUE: Nonweightbearing AP, lateral and oblique views of the bilateral foot were performed.   FINDINGS: Right foot: Large heel spur is seen. Mild Achilles enthesopathy demonstrated. Moderate atherosclerotic disease is seen. There is suggestion of loss of the arch on this nonweightbearing view. Degenerative change anterior plafond demonstrated and dorsal talus as well as posterior plafond and dorsal talus. Sclerosis anterior calcaneus is seen. Degenerative changes mild between the midfoot and forefoot. 1st metatarsophalangeal joint degenerative changes demonstrated. 2nd ray absent phalanges. Distal phalanges of the 3rd through 5th rays with overlap were difficult to visualize. Distal phalanx of the great toe is difficult to visualize.   Left foot: Large heel spur is seen. Advanced atherosclerotic disease demonstrated. Degenerative change in the calcaneus and talus seen with moderate anterior and mild posterior plafond degenerative change. Mild degenerative change in the midfoot and forefoot is seen. Distal phalanx of the great toe is absent. Degenerative change of the 2nd ray phalanges and 3rd ray phalanges seen with degenerative change of the 4th and 5th digits not well seen.       1. Right foot: Large heel spur. Atherosclerotic disease. Suggestion of diffuse osteopenia. No sclerosis to suggest osteomyelitis. No subcutaneous air to suggest gangrene. Absent flanges 2nd ray with difficult to  visualized distal phalanges of the other digits.   2. Left foot: Large heel spur. Degenerative changes of the hindfoot midfoot and forefoot with atherosclerotic disease. Absent distal phalanx of the great toe. No periostitis or abnormal sclerosis with diffuse osteopenia to suggest osteomyelitis. No subcutaneous air to suggest gangrene.       Signed by: Brendon Hook 12/7/2023 8:39 PM Dictation workstation:   [id number]    ECG 12 Lead    Result Date: 12/7/2023   Poor data quality, interpretation may be adversely affected Normal sinus rhythm Right bundle branch block Septal infarct , age undetermined Possible Lateral infarct , age undetermined Abnormal ECG No previous ECGs available Confirmed by Hammad Callahan ([id number]) on 12/7/2023 1:07:11 PM    XR chest 1 view    Result Date: 12/6/2023  Interpreted By:  Roger Murdock, STUDY: XR CHEST 1 VIEW;  12/6/2023 2:08 pm   INDICATION: Signs/Symptoms:Brief AMS and hypotension, now resolved.   COMPARISON: 08/21/2023..   ACCESSION NUMBER(S): [id number]   ORDERING CLINICIAN: PATEL TINSLEY   FINDINGS: New moderate left pleural effusion. There is silhouetting of the left heart border. Left subclavian stent present. Right-sided double-lumen central venous catheter terminates within the right ventricle. No acute osseous findings.       New, moderate left pleural effusion.     MACRO: None   Signed by: Roger Murdock 12/6/2023 2:18 PM Dictation workstation:   [id number]     Assessment/Plan   Unstageable Bilateral heel ulcers  C-difficile infection-resolving  Type 2 diabetes mellitus with peripheral angiopathy with gangrene   acute respiratory failure  Coronavirus infection  Pleural effusion, s/p thoracentesis  History of chronic MRSA bacteremia, infective endocarditis-completed IV vancomycin, now on Doxycycline   Hypotension-resolved  ESRD oh hemodialysis  Severe malnutrition-prealbumin of 3.2           Continue Oral vancomycin-treatment for C. difficile-total of 14 days  Oral doxycycline as  suppressive therapy for MRSA bacteremia with possible infective endocarditis   Zosyn-empiric therapy  Dexamethasone per primary team  Monitor temperature WBC  Local care  Offloading  High-protein diet  Monitor stool  Podiatry follow up   droplet plus precautions  Contact precautions      Stephen Coulter MD

## 2023-12-14 NOTE — INDIVIDUALIZED OVERALL PLAN OF CARE NOTE
Spoke with hospitalist Inez Josue CNP this evening.  Per my conservation with hospitalist and notes looking a bit better, better mentation.   Close to dc but concerns remain re: Goals of care/code status,may need angio of lower extremity 2/2 heel ulcerations. Noted input from vascular as they do not feel she is a  current surgical candidate due to her poor functional status, malnutrition etc.    Will plan on seeing tomorrow am and see if she has now become capable, if not, will try again to engage w/ spouse; we prior had not been successful in making contact to discuss GOC

## 2023-12-14 NOTE — NURSING NOTE
Upon rounding right chest dialysis catheter dressing with dried blood underneath and loose. Routine dressing change done. Site without redness, edema or bleeding noted. Right FA #22 PIV leaking, removed with tip intact. New #20 saline lock placed with ultrasound.

## 2023-12-14 NOTE — CARE PLAN
Over the shift, the patient did make progress toward the following goals:    Problem: Fall/Injury  Goal: Not fall by end of shift  Outcome: Progressing       Problem: Skin  Goal: Promote skin healing  Outcome: Progressing     Problem: Fall/Injury  Goal: Verbalize understanding of risk factor reduction measures to prevent injury from fall in the home  Outcome: Progressing         Problem: Skin  Goal: Participates in plan/prevention/treatment measures  Outcome: Progressing  Flowsheets (Taken 12/14/2023 0000)  Participates in plan/prevention/treatment measures: Elevate heels

## 2023-12-14 NOTE — ACP (ADVANCE CARE PLANNING)
Confirming Previous Code Status:   Advance Care Planning Note     Discussion Date: 12/14/23   Discussion Participants: patient and spouse    The patient wishes to discuss Advance Care Planning today and the following is a brief summary of our discussion.     Patient has capacity to make their own medical decisions: No  Health Care Agent/Surrogate Decision Maker documented in chart: Yes    Documents on file and valid:  Advance Directive/Living Will: No   Health Care Power of : No    Communication of Medical Status/Prognosis:   Discussed with the patient's  Hossein at length patient now with worsening respiratory failure looks like either pleural effusion and/or pneumonia starting in the left lung.  She has bilateral heel wounds may need angiogram from vascular although her nutritional status is very poor may be difficult to heal these wounds.  She is got chronic hypotension complicating her dialysis.  Now with C. Difficile.     Her  feels that she is a poor candidate for aggressive interventions such as CPR or defibrillation or intubation he does endorse that while these may be successful none of which is going to fix her underlying problems that she currently has which been chronic and now acute on chronic.    He would favor limiting measures like CPR or defibrillation or intubation while maintaining  An aggressive disease modifying model of care trying to treat what is treatable at this point should she be developing pneumonia or effusions he would like to go after those.    Communication of Treatment Goals/Options:   Aggressive treatment model of care, within the confines of the newly specified DNR CCA/DNI okay for ICU okay for hemodialysis    Treatment Decisions  Goals of Care: survival is prioritized, if goals for quality or survival can reasonably be achieved     Team Members  LILLIE Rangel and patient's  Hossein  Patient is felt to be a capable decision maker at this point  ability to weigh risk benefits of treatment versus nontreatment her mental status has been vacillating during this admission.    Additional 12 minutes spent which does not meet the threshold of ACP time    MADHURI Rangel-CNP  12/14/2023 11:33 AM

## 2023-12-14 NOTE — PROGRESS NOTES
CONSULT PROGRESS NOTES    SERVICE DATE: 12/14/2023   SERVICE TIME: 2:38 PM    CONSULTING SERVICE: Nephrology    ASSESSMENT AND PLAN   73-year-old with numerous medical problems including end-stage renal disease admitted for hypotension.  1.  End-stage renal disease  2.  Hypotension  3.  Fluid overload  4.  Anemia of chronic renal disease     I am obliging a thrice weekly hemodialysis schedule, next dialysis anticipated tomorrow.  I am inclined to ignore the intermittent hypotension, which I am not certain I believe, particularly given the location of the cuff, typically her lower extremities..  She seems to be mentating better, but concerned about the recent thoracentesis, recurrence of effusion versus worsening infiltrate, and multiple antibiotics for various types of infections.  Appreciate help from infectious disease.  I want to make sure we are on the right track with her multiple antibiotics treating apparently multiple different infections.  Since being hospitalized, she has underwent a thoracentesis, been diagnosed with C. difficile colitis, and has been commenced on oral vancomycin.  Continue midodrine, continue low temperature dialysate, attempt 1.5 L volume removal tomorrow.  No growth as yet on blood cultures.  Case discussed with Inez Josue CNP.    SUBJECTIVE  INTERVAL HPI: Unfortunately, seems to have worsening respiratory status with increased oxygen requirement.  She still has diarrhea.  Her antibiotics have been broadened.  Repeat checks x-ray showing worsening effusion versus infiltrate.  No fevers.  Worsening white blood cell count.    MEDICATIONS:  apixaban, 2.5 mg, oral, BID  calcium acetate, 667 mg, oral, TID with meals  collagenase, , Topical, Daily  [START ON 12/15/2023] doxycycline, 100 mg, oral, Daily  escitalopram, 10 mg, oral, Daily  febuxostat, 40 mg, oral, Every other day  fenofibrate, 160 mg, oral, Daily  gabapentin, 100 mg, oral, BID  heparin, 2,000 Units, intra-catheter, After  Dialysis  heparin, 2,000 Units, intra-catheter, After Dialysis  insulin lispro, 0-5 Units, subcutaneous, TID with meals  ipratropium-albuteroL, 3 mL, nebulization, q6h  metoprolol succinate XL, 12.5 mg, oral, Daily  midodrine, 15 mg, oral, TID with meals  nystatin, 1 Application, Topical, BID  piperacillin-tazobactam, 2.25 g, intravenous, q6h  simvastatin, 40 mg, oral, Nightly  vancomycin, 125 mg, oral, 4x daily           PRN medications: acetaminophen, acetaminophen, albumin human, dextrose 10 % in water (D10W), dextrose, glucagon, polyethylene glycol, zinc oxide     OBJECTIVE  PHYSICAL EXAM:   Heart Rate:  []   Temp:  [36.4 °C (97.5 °F)-36.8 °C (98.2 °F)]   Resp:  [17-25]   BP: ()/(50-97)   SpO2:  [94 %-100 %]   Body mass index is 29.81 kg/m².  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    DATA:   Labs:  Results for orders placed or performed during the hospital encounter of 12/06/23 (from the past 96 hour(s))   POCT GLUCOSE   Result Value Ref Range    POCT Glucose 125 (H) 74 - 99 mg/dL   Vancomycin   Result Value Ref Range    Vancomycin 29.7 (H) 10.0 - 20.0 ug/mL   Basic metabolic panel   Result Value Ref Range    Glucose 105 (H) 65 - 99 mg/dL    Sodium 138 133 - 145 mmol/L    Potassium 3.6 3.4 - 5.1 mmol/L    Chloride 102 97 - 107 mmol/L    Bicarbonate 27 24 - 31 mmol/L    Urea Nitrogen 13 8 - 25 mg/dL    Creatinine 3.60 (H) 0.40 - 1.60 mg/dL    eGFR 13 (L) >60  mL/min/1.73m*2    Calcium 8.5 8.5 - 10.4 mg/dL    Anion Gap 9 <=19 mmol/L   CBC   Result Value Ref Range    WBC 10.4 4.4 - 11.3 x10*3/uL    nRBC 0.0 0.0 - 0.0 /100 WBCs    RBC 3.86 (L) 4.00 - 5.20 x10*6/uL    Hemoglobin 10.9 (L) 12.0 - 16.0 g/dL    Hematocrit 33.5 (L) 36.0 - 46.0 %    MCV 87 80 - 100 fL    MCH 28.2 26.0 - 34.0 pg    MCHC 32.5 32.0 - 36.0 g/dL    RDW 21.4 (H) 11.5 - 14.5 %    Platelets 141 (L) 150 - 450 x10*3/uL   POCT GLUCOSE   Result Value Ref Range    POCT Glucose 72 (L) 74 - 99 mg/dL   POCT GLUCOSE   Result Value Ref Range    POCT Glucose 90 74 - 99 mg/dL   Blood Gas Arterial Full Panel   Result Value Ref Range    POCT pH, Arterial 7.49 (H) 7.38 - 7.42 pH    POCT pCO2, Arterial 40 38 - 42 mm Hg    POCT pO2, Arterial 83 (L) 85 - 95 mm Hg    POCT SO2, Arterial 99 94 - 100 %    POCT Oxy Hemoglobin, Arterial 96.2 94.0 - 98.0 %    POCT Hematocrit Calculated, Arterial 33.0 (L) 36.0 - 46.0 %    POCT Sodium, Arterial 133 (L) 136 - 145 mmol/L    POCT Potassium, Arterial 3.6 3.5 - 5.3 mmol/L    POCT Chloride, Arterial 100 98 - 107 mmol/L    POCT Ionized Calcium, Arterial 1.08 (L) 1.10 - 1.33 mmol/L    POCT Glucose, Arterial 80 74 - 99 mg/dL    POCT Lactate, Arterial 1.8 0.4 - 2.0 mmol/L    POCT Base Excess, Arterial 6.6 (H) -2.0 - 3.0 mmol/L    POCT HCO3 Calculated, Arterial 30.5 (H) 22.0 - 26.0 mmol/L    POCT Hemoglobin, Arterial 10.9 (L) 12.0 - 16.0 g/dL    POCT Anion Gap, Arterial 6 (L) 10 - 25 mmo/L    Patient Temperature 37.0 degrees Celsius    FiO2 28 %    Apparatus CANNULA     Site of Arterial Puncture Radial Right     Paul's Test Positive    POCT GLUCOSE   Result Value Ref Range    POCT Glucose 59 (L) 74 - 99 mg/dL   POCT GLUCOSE   Result Value Ref Range    POCT Glucose 145 (H) 74 - 99 mg/dL   POCT GLUCOSE   Result Value Ref Range    POCT Glucose 109 (H) 74 - 99 mg/dL   POCT GLUCOSE   Result Value Ref Range    POCT Glucose 120 (H) 74 - 99 mg/dL   Vancomycin   Result Value Ref Range    Vancomycin  22.8 (H) 10.0 - 20.0 ug/mL   Basic metabolic panel   Result Value Ref Range    Glucose 101 (H) 65 - 99 mg/dL    Sodium 136 133 - 145 mmol/L    Potassium 3.3 (L) 3.4 - 5.1 mmol/L    Chloride 101 97 - 107 mmol/L    Bicarbonate 27 24 - 31 mmol/L    Urea Nitrogen 10 8 - 25 mg/dL    Creatinine 2.60 (H) 0.40 - 1.60 mg/dL    eGFR 19 (L) >60 mL/min/1.73m*2    Calcium 8.3 (L) 8.5 - 10.4 mg/dL    Anion Gap 8 <=19 mmol/L   CBC   Result Value Ref Range    WBC 9.5 4.4 - 11.3 x10*3/uL    nRBC 0.0 0.0 - 0.0 /100 WBCs    RBC 3.37 (L) 4.00 - 5.20 x10*6/uL    Hemoglobin 9.5 (L) 12.0 - 16.0 g/dL    Hematocrit 29.3 (L) 36.0 - 46.0 %    MCV 87 80 - 100 fL    MCH 28.2 26.0 - 34.0 pg    MCHC 32.4 32.0 - 36.0 g/dL    RDW 21.2 (H) 11.5 - 14.5 %    Platelets 100 (L) 150 - 450 x10*3/uL   POCT GLUCOSE   Result Value Ref Range    POCT Glucose 95 74 - 99 mg/dL   POCT GLUCOSE   Result Value Ref Range    POCT Glucose 93 74 - 99 mg/dL   POCT GLUCOSE   Result Value Ref Range    POCT Glucose 96 74 - 99 mg/dL   POCT GLUCOSE   Result Value Ref Range    POCT Glucose 124 (H) 74 - 99 mg/dL   POCT GLUCOSE   Result Value Ref Range    POCT Glucose 117 (H) 74 - 99 mg/dL   Basic metabolic panel   Result Value Ref Range    Glucose 139 (H) 65 - 99 mg/dL    Sodium 138 133 - 145 mmol/L    Potassium 3.8 3.4 - 5.1 mmol/L    Chloride 100 97 - 107 mmol/L    Bicarbonate 27 24 - 31 mmol/L    Urea Nitrogen 16 8 - 25 mg/dL    Creatinine 3.40 (H) 0.40 - 1.60 mg/dL    eGFR 14 (L) >60 mL/min/1.73m*2    Calcium 8.7 8.5 - 10.4 mg/dL    Anion Gap 11 <=19 mmol/L   CBC   Result Value Ref Range    WBC 12.3 (H) 4.4 - 11.3 x10*3/uL    nRBC 0.0 0.0 - 0.0 /100 WBCs    RBC 4.00 4.00 - 5.20 x10*6/uL    Hemoglobin 11.3 (L) 12.0 - 16.0 g/dL    Hematocrit 35.3 (L) 36.0 - 46.0 %    MCV 88 80 - 100 fL    MCH 28.3 26.0 - 34.0 pg    MCHC 32.0 32.0 - 36.0 g/dL    RDW 21.7 (H) 11.5 - 14.5 %    Platelets 121 (L) 150 - 450 x10*3/uL   POCT GLUCOSE   Result Value Ref Range    POCT Glucose 142 (H) 74  - 99 mg/dL   POCT GLUCOSE   Result Value Ref Range    POCT Glucose 112 (H) 74 - 99 mg/dL   POCT GLUCOSE   Result Value Ref Range    POCT Glucose 157 (H) 74 - 99 mg/dL   POCT GLUCOSE   Result Value Ref Range    POCT Glucose 113 (H) 74 - 99 mg/dL   Basic metabolic panel   Result Value Ref Range    Glucose 149 (H) 65 - 99 mg/dL    Sodium 134 133 - 145 mmol/L    Potassium 3.2 (L) 3.4 - 5.1 mmol/L    Chloride 97 97 - 107 mmol/L    Bicarbonate 26 24 - 31 mmol/L    Urea Nitrogen 11 8 - 25 mg/dL    Creatinine 2.60 (H) 0.40 - 1.60 mg/dL    eGFR 19 (L) >60 mL/min/1.73m*2    Calcium 8.6 8.5 - 10.4 mg/dL    Anion Gap 11 <=19 mmol/L   CBC   Result Value Ref Range    WBC 17.9 (H) 4.4 - 11.3 x10*3/uL    nRBC 0.0 0.0 - 0.0 /100 WBCs    RBC 4.04 4.00 - 5.20 x10*6/uL    Hemoglobin 11.5 (L) 12.0 - 16.0 g/dL    Hematocrit 34.8 (L) 36.0 - 46.0 %    MCV 86 80 - 100 fL    MCH 28.5 26.0 - 34.0 pg    MCHC 33.0 32.0 - 36.0 g/dL    RDW 21.8 (H) 11.5 - 14.5 %    Platelets 79 (L) 150 - 450 x10*3/uL   POCT GLUCOSE   Result Value Ref Range    POCT Glucose 151 (H) 74 - 99 mg/dL   POCT GLUCOSE   Result Value Ref Range    POCT Glucose 147 (H) 74 - 99 mg/dL   Blood Gas Arterial Full Panel   Result Value Ref Range    POCT pH, Arterial 7.48 (H) 7.38 - 7.42 pH    POCT pCO2, Arterial 42 38 - 42 mm Hg    POCT pO2, Arterial 103 (H) 85 - 95 mm Hg    POCT SO2, Arterial 100 94 - 100 %    POCT Oxy Hemoglobin, Arterial 97.3 94.0 - 98.0 %    POCT Hematocrit Calculated, Arterial 37.0 36.0 - 46.0 %    POCT Sodium, Arterial 131 (L) 136 - 145 mmol/L    POCT Potassium, Arterial 3.2 (L) 3.5 - 5.3 mmol/L    POCT Chloride, Arterial 98 98 - 107 mmol/L    POCT Ionized Calcium, Arterial 1.16 1.10 - 1.33 mmol/L    POCT Glucose, Arterial 151 (H) 74 - 99 mg/dL    POCT Lactate, Arterial 2.6 (H) 0.4 - 2.0 mmol/L    POCT Base Excess, Arterial 7.1 (H) -2.0 - 3.0 mmol/L    POCT HCO3 Calculated, Arterial 31.3 (H) 22.0 - 26.0 mmol/L    POCT Hemoglobin, Arterial 12.2 12.0 - 16.0 g/dL     POCT Anion Gap, Arterial 5 (L) 10 - 25 mmo/L    Patient Temperature 37.0 degrees Celsius    FiO2 36 %    Apparatus CANNULA     Site of Arterial Puncture Radial Right     Paul's Test Positive    Blood Gas Lactic Acid, Venous   Result Value Ref Range    POCT Lactate, Venous 3.6 (H) 0.4 - 2.0 mmol/L         SIGNATURE: Saeed Mondragon MD PATIENT NAME: Ayanna Gross   DATE: December 14, 2023 MRN: [id number]   TIME: 2:38 PM PAGER: [phone]

## 2023-12-15 NOTE — PROGRESS NOTES
Physical Therapy                 Therapy Communication Note    Patient Name: Ayanna Gross  MRN: [id number]  Today's Date: 12/15/2023     Discipline: Physical Therapy    Missed Visit Reason: Patient in a medical procedure (Pt with other providers at bedside completing procedure. Will hold follow-up.)    Missed Time: Attempt    Comment:

## 2023-12-15 NOTE — CONSULTS
Reason For Consult  Hypoxia, shortness of breath and pleural effusion    History Of Present Illness  79-year-old female with known end-stage renal disease who was noted to be hypotensive.  Patient was sent to the emergency room and admitted.  She was cultured and started on broad-spectrum antibiotics.  Noted bilateral heel wounds and was seen by the vascular service.  She was noted to have peripheral arterial disease and has had chronic ulcerations for over a year.  Patient was noted to have a left pleural effusion while in the hospital and underwent a thoracentesis.  While here the patient again was noted to become more hypoxic.  Chest x-ray was done showing left lower lobe density and concern for pneumonia was raised.  She was swabbed for COVID-19 and found to be positive and was started on dexamethasone.  Since then however she has been weaned back to room air.  Patient says she has a mild cough and feels a little bit congested.  Is not able to produce any sputum     Past Medical History  She has a past medical history of Asthma, CAD (coronary artery disease), CHF (congestive heart failure) (CMS/[facility]), Chronic kidney disease on chronic dialysis (CMS/[facility]), Hypertension, Personal history of diseases of the blood and blood-forming organs and certain disorders involving the immune mechanism, Sleep apnea, and Type 2 diabetes mellitus without complications (CMS/[facility]) (09/15/2022).    Surgical History  She has a past surgical history that includes Other surgical history (11/18/2013); Carpal tunnel release (11/18/2013); Umbilical hernia repair (11/18/2013); Tonsillectomy (11/18/2013); Hand surgery (11/18/2013); Other surgical history (11/18/2013); Other surgical history (11/18/2013); Other surgical history (04/04/2022); Other surgical history (04/04/2022); Other surgical history (04/04/2022); Mastectomy, partial (04/10/2014); Burr lymph node biopsy (04/10/2014); US guided percutaneous placement (6/29/2023); and MR  "angio head wo IV contrast (8/29/2023).    Review of Systems     Noncontributory     Social History  She reports that she has never smoked. She has never used smokeless tobacco. She reports that she does not currently use alcohol. She reports that she does not use drugs.    Family History  Family History   Problem Relation Name Age of Onset    Lymphoma Mother      Prostate cancer Father          passed away fabiana min 2017        Allergies  Patient has no known allergies.    Last Recorded Vitals  Blood pressure (!) 55/18, pulse 82, temperature 36.7 °C (98.1 °F), temperature source Oral, resp. rate 23, height 1.575 m (5' 2.01\"), weight 70.9 kg (156 lb 4.9 oz), SpO2 96 %.     Physical Exam        12/15/2023     5:55 AM 12/15/2023     6:03 AM 12/15/2023     8:32 AM 12/15/2023     9:29 AM 12/15/2023     9:36 AM 12/15/2023     9:43 AM 12/15/2023    11:30 AM   Vitals   Systolic 84 104  61 58 55 55   Diastolic 66 86  31 23 25 18   Heart Rate  74 73 79 70 72 82   Temp   36.2 °C (97.2 °F)    36.7 °C (98.1 °F)   Resp  17  17 17 15 23       1. vital signs reviewed  2. general appearance -chronically but not acutely ill-appearing  3. Skin -warm and dry, no rash or diaphoresis  4. HEENT-conjunctiva are pink.  Sclerae are anicteric.  Nares are unremarkable.  Pharynx is unremarkable  5. Neck-supple.  No JVP.  No thyromegaly.  6. Adenopathy-no peripheral adenopathy appreciable  7. Chest examination-lungs are diminished but clear bilaterally without wheezing rales or rhonchi.  Symmetric excursion.  No fremitus.  8. Heart sounds are normal S1 and S2.  No murmurs rubs or gallops.  9. Abdomen-soft and nontender without hepatosplenomegaly, no rebound, no distention.  Normoactive bowel sounds  10. Extremities-no calf tenderness, cyanosis clubbing, or edema  11. musculoskeletal-no joint effusion or swelling.  12. Neurologic-awake and alert, moves all extremities symmetrically, follows commands appropriately       Labs    Serum lites was " reviewed, potassium 3.2, creatinine 2.6  White blood cell count 17.9.  H&H 11.5 and 35.  Platelet count 79,000  Pleural fluid cytology was sterile.  I do not see any chemistries on the fluid    Xrays:  (personally reviewed)    Chest x-ray was reviewed and shows recurrence of the left pleural effusion.    Ventilator/ABG       Results from last 7 days   Lab Units 12/14/23  1158   POCT PH, ARTERIAL pH 7.48*   POCT PCO2, ARTERIAL mm Hg 42   POCT PO2, ARTERIAL mm Hg 103*   POCT HCO3 CALCULATED, ARTERIAL mmol/L 31.3*   POCT BASE EXCESS, ARTERIAL mmol/L 7.1*       Impression    .  Left pleural effusion  .  Shortness of breath  .  COVID-19 infection  .  Peripheral vascular disease  .  End-stage renal disease.  .  Recent C. difficile colitis  .  History of infective endocarditis with MRSA    Plan    .  Would recommend a repeat left-sided thoracentesis.  Will send for fluid analytics  .  Will discontinue dexamethasone as she is on room air.  Most recent data suggests that patient on room air with COVID-19 did not fare as well with steroids  .  Continue antibiotics, infectious disease following  .  Dialysis  .  Bronchodilators  .  Culture sputum    Thank you    Moris Scott MD, [facility]

## 2023-12-15 NOTE — SIGNIFICANT EVENT
Blood pressures documented by nursing with systolic in the 50s. Patient is awake and talking, eating. Blood pressures are being checked in her leg which are inaccurate readings. Discussed with Dr. Mondragon, okay to check blood pressure in left arm to get an accurate blood pressure. Discussed with nursing to notify hospitalist if they feel the blood pressure is low and accurate but do not document blood pressure if it is inaccurate and hospitalist not notified. Although blood pressure in her upper extremities contraindicated, it is more critical that we get an accurate blood pressure as this patient is a dialysis patient and presented with hypotension during dialysis.

## 2023-12-15 NOTE — NURSING NOTE
Spoke to Dr. Mondragon okay to start dialysis this morning. Disregard blood pressures and to rely on mentation. BP reading 58/31. Tried multiple times on different extremities. Report given and notified of not able to give albumin last night. Patient is A & O x3. Awaiting access team. Okay to give albumin with dialysis and will give midodrine before.     Will continue to monitor.     JAIME SIM RN

## 2023-12-15 NOTE — PROCEDURES
0735 arrived to pt's room with dialysis equipment.  Received report from Sascha, the night RN.  He reported that the pt has been having unacceptably low Bps during the night and made hospitalist aware.  He states he got somewhat of a reasonable BP just early this morning after repositioning the BP cuff several times and to different extremities.  He was also giving report to the on coming RN Aminata and she said she would contact Dr. Mondragon to find out what we should do about monitoring her Bps before starting tx.  Told her that this nurse would start setting up the equipment while waiting for return call from    0830 Dr. Mondragon called back and said to disregard the Bps and to start tx.  Monitor for change in mental status.  Pt is awake, A&O x2-3.  Able to answer questions appropriately and denies any discomfort or symptoms of hypotension.  Proceeded to start dialysis at 0846.

## 2023-12-15 NOTE — PROGRESS NOTES
Hemodialysis procedure note:  She was diagnosed with COVID-19.  She has C. difficile as well.  She is on broad-spectrum antibiotics.  She remains with exceedingly erratic and unreliable blood pressure readings.  Fortunately, she is mentating well.  I am inclined to ignore the blood pressure, she runs chronically low.  She is on broad-spectrum antibiotics and has no growth on blood cultures.  We are removing very little fluid on dialysis with a low temperature.  We are giving her midodrine and IV albumin.  She appears to have an amassing amount of infectious problems.  Unclear disposition at present.  No dialysis needs anticipated this weekend.  I will begin erythropoietin stimulating agents for her anemia of chronic kidney disease.

## 2023-12-15 NOTE — NURSING NOTE
Pt has a R chest dialysis catheter, drsg dry and intact (dated 12/14) without any redness, swelling or drainage.

## 2023-12-15 NOTE — PROGRESS NOTES
Ayanna Gross is a 73 y.o. female on day 9 of admission presenting with Hypotension, unspecified hypotension type.    Patient now COVID positive, not medically stable for discharge. Updated facility precert will  today, will need knew precert once medically clear.     **Patient does not have safe discharge plan, do not discharge without speaking to care coordination**             Irene Patterson RN

## 2023-12-15 NOTE — PROGRESS NOTES
Ayanna Gross is a 73 y.o. female on day 9 of admission presenting with Hypotension, unspecified hypotension type.      Subjective   Patient seen and examined. Drowsy/oriented times 2-3. Denies chest pain, shortness of breath, nausea, or vomiting. She does have a moist cough. Increased oxygen demand and confusion yesterday. CXR showed concern for pneumonia, swabbed for COVID which was positive. Started on dexamethasone. This morning she is receiving HD, she removed her oxygen and is currently 97% on room air. Will continue to monitor.           Objective     Last Recorded Vitals  /86 (BP Location: Left leg, Patient Position: Lying)   Pulse 74   Temp 36.4 °C (97.5 °F) (Oral)   Resp 17   Wt 70.9 kg (156 lb 4.9 oz)   SpO2 99%   Intake/Output last 3 Shifts:    Intake/Output Summary (Last 24 hours) at 12/15/2023 0920  Last data filed at 12/14/2023 1649  Gross per 24 hour   Intake 50 ml   Output --   Net 50 ml         Admission Weight  Weight: 72.6 kg (160 lb) (12/06/23 1301)    Daily Weight  12/15/23 : 70.9 kg (156 lb 4.9 oz)    Image Results  XR chest 1 view  Narrative: Interpreted By:  Judd Lee,   STUDY:  XR CHEST 1 VIEW; 12/14/2023 10:43 am      INDICATION:  Hypoxic respiratory failure.      COMPARISON:  December 8, 2023      ACCESSION NUMBER(S):  [id number]      ORDERING CLINICIAN:  RODRI SURESH      FINDINGS:  RESULT: Right-sided double-lumen dialysis catheter is noted with  distal tip at the level of the right atrium. Vascular stents are  noted in the left subclavian region. Surgical clips are noted in the  left axilla. Cardiac silhouette size is indeterminate as the left  heart border is obscured. There are calcifications involving the  thoracic aorta. Left basilar obscurity obscures the left  hemidiaphragm and left heart border with hazy opacity extending to  the left mid lung. There are degenerative changes of the bilateral  shoulders.      Impression: Increasing density in the left mid  and lower lung suggests increasing  infiltrate or pleural effusion.          Signed by: Judd Lee 12/14/2023 10:58 AM  Dictation workstation:   [id number]      Physical Exam  Vitals reviewed.   Constitutional:       Comments: Poor historian   HENT:      Head: Normocephalic and atraumatic.   Eyes:      Extraocular Movements: Extraocular movements intact.      Conjunctiva/sclera: Conjunctivae normal.   Cardiovascular:      Rate and Rhythm: Normal rate and regular rhythm.   Pulmonary:      Effort: Pulmonary effort is normal.      Breath sounds: No wheezing, rhonchi or rales.      Comments: Breath sounds diminished bilaterally  Abdominal:      General: Bowel sounds are normal.      Palpations: Abdomen is soft.      Tenderness: There is no abdominal tenderness.   Skin:     General: Skin is warm and dry.      Comments: Dressing to BLE CDI   Neurological:      General: No focal deficit present.      Mental Status: She is alert and oriented to person, place, and time.         Relevant Results  Lab Results   Component Value Date    GLUCOSE 149 (H) 12/14/2023    CALCIUM 8.6 12/14/2023     12/14/2023    K 3.2 (L) 12/14/2023    CO2 26 12/14/2023    CL 97 12/14/2023    BUN 11 12/14/2023    CREATININE 2.60 (H) 12/14/2023     Lab Results   Component Value Date    WBC 17.9 (H) 12/14/2023    HGB 11.5 (L) 12/14/2023    HCT 34.8 (L) 12/14/2023    MCV 86 12/14/2023    PLT 79 (L) 12/14/2023     US thoracentesis  Result Date: 12/8/2023  FINDINGS: A total of 750 cc of clear yellow fluid was aspirated. A sample was sent for analysis. The patient tolerated the procedure well.     Ultrasound-guided left thoracentesis.     Signed by: Sandro Hernandez 12/8/2023 3:01 PM     XR chest 1 view  Result Date: 12/8/2023  Decreased size of left effusion following ultrasound-guided thoracentesis with no pneumothorax.   There is some residual left pleural effusion noted with infiltrate in the left mid and lower lung field.   Signed by: Maria  Jose 12/8/2023 12:51 PM Dictation workstation:   [id number]    XR foot 3+ views bilateral  Result Date: 12/7/2023  1. Right foot: Large heel spur. Atherosclerotic disease. Suggestion of diffuse osteopenia. No sclerosis to suggest osteomyelitis. No subcutaneous air to suggest gangrene. Absent flanges 2nd ray with difficult to visualized distal phalanges of the other digits.   2. Left foot: Large heel spur. Degenerative changes of the hindfoot midfoot and forefoot with atherosclerotic disease. Absent distal phalanx of the great toe. No periostitis or abnormal sclerosis with diffuse osteopenia to suggest osteomyelitis. No subcutaneous air to suggest gangrene.           ECG 12 Lead  Result Date: 12/7/2023  Poor data quality, interpretation may be adversely affected Normal sinus rhythm Right bundle branch block Septal infarct , age undetermined Possible Lateral infarct , age undetermined Abnormal ECG No previous ECGs available     XR chest 1 view  New, moderate left pleural effusion.            Assessment/Plan      Principal Problem:    Hypotension, unspecified hypotension type  Active Problems:    Paroxysmal atrial fibrillation (CMS/HCC)    Peripheral vascular disease (CMS/HCC)    Orthostatic hypotension    Mixed hyperlipidemia    End-stage renal disease on hemodialysis (CMS/HCC)    DM (diabetes mellitus) (CMS/HCC)    Absent pedal pulses    Acute hypoxic respiratory failure  Suspect secondary pneumonia  CXR showed increasing density in the left mid and lower lung suggests increasing infiltrate or pleural effusion.  ABG, reviewed with attending  COVID positive  Leukocytosis  Moist cough  Discussed with ID, start IV Zosyn, continue doxy emperically and oral vanc for C diff  MRSA PCR pending  Passed swallow eval  Supplemental oxygen, was on 5L yesterday, today 97% on room air  Duonebs    Bilateral heel wounds   ID and podiatry following, appreciate recs  Wound care consult   doxycycline  Blood cultures negative,  final  Bilateral boots to protect heels from pressure   Podiatry recommendations, santyl/medihone, adaptic, 4x4, ABD, kerlix to change daily, no surgical debridement recommended due to vascular status    Metabolic encephalopathy-improved  Unsure if this is her baseline, tech says she waxes and wanes  CT head and ABG unremarkable  Improved    End-stage renal disease on hemodialysis   Consult Dr. Mondragon - appreciate recs  Continue Phoslo   HD per nephrology  Cleared by nephrology for discharge    Hypotension/orthostatic hypotension  Improved, monitor close  Continue midodrine   Check orthostatic vitals  Continue low-dose Toprol in am with hold parameters  Hold Imdur   Left arm fistula and history of right sided mastectomy. BP different in upper extremities than lower extremities.   Hypotension improved with albumin    Fall precautions   Stable    Peripheral vascular disease   vascular studies of lower extremities results as above  Consult vascular surgery, appreciate recommendations  Vascular recommending palliative to follow up on goals of care, may require angiogram as outpatient if she improves, currently not surgical candidate     Paroxysmal atrial fibrillation  Continue low-dose beta blocker   Eliquis for stroke prophylaxis     Thrombocytopenia  PLT decreased to 79, monitor close, consider hem/onc if no improvement  No s/s of bleeding    Mixed hyperlipidemia  Continue statin, fenofibrate     DM (diabetes mellitus)   Hold Tradjenta   AC/HS glucose with SSI coverage   Hypoglycemia protocol     Groin excoriation   Wound care consult   Nystatin powder   Keep skin clean and dry   Sacrum covered with Mepilex.     C-diff   PCR positive and negative toxin.   Vancomycin 125 mg 4 times daily   ID consult - appreciate recs   Loose stools improved           Malnutrition Diagnosis Status: Ongoing  Malnutrition Diagnosis: Moderate malnutrition related to acute disease or injury  As Evidenced by: 1-2% unintentional wt loss in  1 week (~4% in 2 weeks), Poor intake <75% of estimated energy requirement > 7days, and mild fat and muscle deficits  I agree with the dietitian's malnutrition diagnosis.    Plan  HD per nephrology  Pleural fluid showed moderate polymorphonuclear leukocytes, no organisms seen  Blood culture negative  Continue doxcycline as suppressive therapy for MRSA bacteremia with possible infective endocarditis  Oral vancomycin for a total of 14 days  dexamethasone  Maintain precautions  PT/OT  Podiatry signed off, wound care recommendations as above  Per vascular, she is not a candidate for surgery  Continue ATB per ID  Seen by palliative, patient is now a DNRCCA, no intubation  Rectal tube ordered per wound care recommendations to facilitate healing to excoriation to buttocks  Discharge planning to SNF when medically stable  Morning labs pending         Inez Josue, APRN-CNP

## 2023-12-15 NOTE — INDIVIDUALIZED OVERALL PLAN OF CARE NOTE
Discussed earlier today with Dr Mondragon and Inez Josue CNP  Pt being treated for multiple issues now found to be COVID positive had acute resp failure w hypoxia had been started on dexamethasone but since then was able to be weaned back to room air.     Plans for potential repeat left thoracentesis fluid should be analyzed given reoccurrence.    Agree with dc'd of steroids (at this time) since hypoxic resp fx resolved and may very well be attributable to PNA/ recurrent pleural effusion; recent data supporting worse outcomes if steroids in COVID if with on going hypoxia.     Global supportive care, but feel overall outlook given multitude of serious issues long-term is poor with current data at hand.     I have had these conversations with pt's  recently.    I spoke with Stepdown nurse, no acute palliative needs today seems to be doing better.    Available PRN over weekend.

## 2023-12-15 NOTE — CARE PLAN
"Over the shift, the patient did not make progress toward the following goals.:    Problem: Chronic Conditions and Co-morbidities  Goal: Patient's chronic conditions and co-morbidity symptoms are monitored and maintained or improved  Outcome: Not Progressing     The patient had persistent readings of hypotension but had no symptoms or changes in mental status identified.  Albumin was ordered and she lost her IV access during administration.   Acceptable blood pressure readings were able to be obtained after adjustments to the cuff placement. The hospitalist, Dr. Coulter, was notified throughout the process. Upon discovering that the blood pressures were within an acceptable range, he indicated that the albumin was ok to be stopped and that the \"IV Team\" is to be consulted in order to get access to the patient. She is now resting quietly in bed, no identified signs of acute distress.  "

## 2023-12-15 NOTE — NURSING NOTE
Rapid Response RN called to bedside for low Bps (see flowsheets for data). Sascha Burciaga notified dr coulter hospitalist of Bps. New order for albumin. During albumin admin IV went bad. 2 Rns unable to place IV after multiple attempts. This RN rearranged BP cuff to Left Leg with increased BP readings. See flowsheets for values. Dr. Coulter was updated on this said okay to hold albumin until iv nurse can come see the patient.

## 2023-12-15 NOTE — PROGRESS NOTES
yAanna Gross is a 73 y.o. female on day 9 of admission presenting with Hypotension, unspecified hypotension type.    Subjective   Interval History:   Undergoing dialysis when evaluated  Afebrile  Denies SOB, reports a cough  Denies nausea, vomiting, diarrhea  Denies pain    Objective   Range of Vitals (last 24 hours)  Heart Rate:  [70-94]   Temp:  [36.2 °C (97.2 °F)-36.8 °C (98.2 °F)]   Resp:  [14-26]   BP: ()/(18-86)   Weight:  [70.9 kg (156 lb 4.9 oz)]   SpO2:  [92 %-100 %]   Daily Weight  12/15/23 : 70.9 kg (156 lb 4.9 oz)    Body mass index is 28.58 kg/m².    Physical Exam  Constitutional:       Appearance: Normal appearance.  Alert but confused  HENT:      Head: Normocephalic and atraumatic.      Nose: Nose normal.      Mouth/Throat:      Mouth: Mucous membranes are moist.      Pharynx: Oropharynx is clear.   Eyes:      Extraocular Movements: Extraocular movements intact.      Conjunctiva/sclera: Conjunctivae normal.   Cardiovascular:      Rate and Rhythm: Normal rate and regular rhythm.   Pulmonary:      Effort: Pulmonary effort is normal.      Breath sounds:  decreased breath sounds bilaterally  Abdominal:      General: Bowel sounds are normal.      Palpations: Abdomen is soft.   Musculoskeletal:         General: Normal range of motion.      Cervical back: Normal range of motion and neck supple.      Right lower leg: Edema present.      Left lower leg: Edema present.   Skin:     Comments:  mild groin fold erythema  LUIS fistula    Antibiotics  sodium chloride 0.9 % bolus 500 mL  cefepime (Maxipime) 1 g in dextrose 5 % 50 mL IV  vancomycin-diluent combo no.1 (Xellia) IVPB 1 g  apixaban (Eliquis) tablet 2.5 mg  calcium acetate (Phoslo) capsule 2,000 mg  escitalopram (Lexapro) tablet 10 mg  febuxostat (Uloric) tablet 40 mg  fenofibrate (Triglide) tablet 160 mg  gabapentin (Neurontin) capsule 300 mg  metoprolol succinate XL (Toprol-XL) 24 hr tablet 12.5 mg  midodrine (Proamatine) tablet 10 mg  nystatin  (Mycostatin) 100,000 unit/gram powder 1 Application  B complex-vitamin C tablet 1 tablet  simvastatin (Zocor) tablet 40 mg  acetaminophen (Tylenol) tablet 650 mg  acetaminophen (Tylenol) tablet 650 mg  polyethylene glycol (Glycolax, Miralax) packet 17 g  dextrose 50 % injection 25 g  glucagon (Glucagen) injection 1 mg  dextrose 10 % in water (D10W) infusion  insulin lispro (HumaLOG) injection 0-5 Units  zinc oxide 20 % ointment 1 Application  calcium acetate (Phoslo) capsule 667 mg  vancomycin (Vancocin) capsule 125 mg  lidocaine PF (Xylocaine) 10 mg/mL (1 %) injection  heparin 1,000 unit/mL injection 2,000 Units  heparin 1,000 unit/mL injection 2,000 Units  midodrine (Proamatine) tablet 15 mg  albumin human 25 % solution 25 g  polyethylene glycol (Glycolax, Miralax) packet 17 g  collagenase 250 unit/gram ointment  vancomycin (Vancocin) 1,750 mg in dextrose 5 % in water (D5W) 250 mL IV  vancomycin-diluent combo no.1 (Xellia) IVPB 1,750 mg  albumin human 25 % solution 12.5 g  heparin 1,000 unit/mL injection 2,000 Units  heparin 1,000 unit/mL injection 2,000 Units  vancomycin (Vancocin) placeholder  doxycycline (Vibramycin) capsule 100 mg  potassium chloride CR (Klor-Con) ER tablet 10 mEq  gabapentin (Neurontin) capsule 100 mg  heparin 1,000 unit/mL injection 2,000 Units  heparin 1,000 unit/mL injection 2,000 Units      Relevant Results  Labs  Results from last 72 hours   Lab Units 12/14/23 0454 12/13/23 0457   WBC AUTO x10*3/uL 17.9* 12.3*   HEMOGLOBIN g/dL 11.5* 11.3*   HEMATOCRIT % 34.8* 35.3*   PLATELETS AUTO x10*3/uL 79* 121*       Results from last 72 hours   Lab Units 12/14/23 0454 12/13/23 0457   SODIUM mmol/L 134 138   POTASSIUM mmol/L 3.2* 3.8   CHLORIDE mmol/L 97 100   CO2 mmol/L 26 27   BUN mg/dL 11 16   CREATININE mg/dL 2.60* 3.40*   GLUCOSE mg/dL 149* 139*   CALCIUM mg/dL 8.6 8.7   ANION GAP mmol/L 11 11   EGFR mL/min/1.73m*2 19* 14*           Estimated Creatinine Clearance: 18.3 mL/min (A) (by C-G  formula based on SCr of 2.6 mg/dL (H)).  CRP   Date Value Ref Range Status   06/23/2023 19.9 (H) 0 - 2.0 MG/DL Final     Comment:     Performed at [address] [address]   05/22/2022 1.0 0 - 2.0 MG/DL Final     Comment:     Performed at [address] [address]   12/13/2020 10.1 (H) 0 - 2.0 MG/DL Final     Comment:     Performed at [address] [address]     Microbiology   reviewed-nasal MRSA PCR pending, interval positive coronavirus PCR  Imaging  XR chest 1 view    Result Date: 12/14/2023  Interpreted By:  Judd Lee, STUDY: XR CHEST 1 VIEW; 12/14/2023 10:43 am   INDICATION: Hypoxic respiratory failure.   COMPARISON: December 8, 2023   ACCESSION NUMBER(S): [id number]   ORDERING CLINICIAN: RODRI SURESH   FINDINGS: RESULT: Right-sided double-lumen dialysis catheter is noted with distal tip at the level of the right atrium. Vascular stents are noted in the left subclavian region. Surgical clips are noted in the left axilla. Cardiac silhouette size is indeterminate as the left heart border is obscured. There are calcifications involving the thoracic aorta. Left basilar obscurity obscures the left hemidiaphragm and left heart border with hazy opacity extending to the left mid lung. There are degenerative changes of the bilateral shoulders.       Increasing density in the left mid and lower lung suggests increasing infiltrate or pleural effusion.     Signed by: Judd Lee 12/14/2023 10:58 AM Dictation workstation:   [id number]     Assessment/Plan   Unstageable Bilateral heel ulcers  C-difficile infection-resolving  Type 2 diabetes mellitus with peripheral angiopathy with gangrene   acute respiratory failure  Coronavirus infection  Pleural effusion, s/p thoracentesis  History of chronic MRSA bacteremia, infective endocarditis-completed IV vancomycin, now on Doxycycline   Hypotension-resolved  ESRD on hemodialysis  Severe  malnutrition-prealbumin of 3.2           Continue Oral vancomycin-treatment for C. difficile-total of 14 days  Oral doxycycline as suppressive therapy for MRSA bacteremia with possible infective endocarditis  Continue Zosyn-empiric therapy  Dexamethasone per primary team  Monitor temperature WBC  Local care  Offloading  High-protein diet  Monitor stool  Podiatry follow up  Follow-up MRSA PCR   droplet plus precautions  Contact plus precautions  Pulmonary consultation    Discussed with Dr Yfn PINO Staff, APRN-CNP

## 2023-12-16 NOTE — PROGRESS NOTES
"Ayanna Gross is a 73 y.o. female on day 10 of admission.  Patient seen in follow-up for left-sided pleural effusion shortness of breath and COVID-19 infection.     Subjective   Patient resting in bed, complains of being tired.  Patient denies having any headache, dizziness, chest pain or shortness of breath.  Does endorse a productive cough with white sputum.  Patient afebrile.  Patient currently on room air with an O2 saturation of 100%.       Objective     Physical Exam  Constitutional:       General: She is not in acute distress.  HENT:      Head: Normocephalic.      Mouth/Throat:      Mouth: Mucous membranes are moist.      Pharynx: Oropharynx is clear.   Eyes:      Pupils: Pupils are equal, round, and reactive to light.   Cardiovascular:      Rate and Rhythm: Normal rate and regular rhythm.      Pulses: Normal pulses.      Heart sounds: Normal heart sounds.   Pulmonary:      Effort: Pulmonary effort is normal. No respiratory distress.      Breath sounds: Rhonchi present. No wheezing.   Abdominal:      General: Bowel sounds are normal. There is no distension.      Palpations: Abdomen is soft.   Musculoskeletal:      Cervical back: Normal range of motion.      Right lower leg: No edema.      Left lower leg: No edema.      Comments: Dressings bilateral heels dry and intact   Skin:     General: Skin is warm and dry.   Neurological:      Mental Status: She is alert. Mental status is at baseline.   Psychiatric:         Mood and Affect: Mood normal.         Behavior: Behavior normal.         Last Recorded Vitals  Blood pressure 97/78, pulse 67, temperature 36.7 °C (98.1 °F), temperature source Oral, resp. rate 14, height 1.575 m (5' 2.01\"), weight 70.9 kg (156 lb 4.9 oz), SpO2 100 %.  Intake/Output last 3 Shifts:  I/O last 3 completed shifts:  In: 1050 (14.8 mL/kg) [I.V.:600 (8.5 mL/kg); Other:400; IV Piggyback:50]  Out: 1900 (26.8 mL/kg) [Other:1900]  Weight: 70.9 kg     Relevant Results  Scheduled " medications  apixaban, 2.5 mg, oral, BID  calcium acetate, 667 mg, oral, TID with meals  collagenase, , Topical, Daily  doxycycline, 100 mg, oral, Daily  epoetin di or biosimilar, 10,000 Units, intravenous, Every Mon/Wed/Fri  escitalopram, 10 mg, oral, Daily  febuxostat, 40 mg, oral, Every other day  fenofibrate, 160 mg, oral, Daily  gabapentin, 100 mg, oral, BID  heparin, 2,000 Units, intra-catheter, After Dialysis  heparin, 2,000 Units, intra-catheter, After Dialysis  heparin, 2,000 Units, intra-catheter, After Dialysis  heparin, 2,000 Units, intra-catheter, After Dialysis  insulin lispro, 0-5 Units, subcutaneous, TID with meals  ipratropium-albuteroL, 3 mL, nebulization, TID  metoprolol succinate XL, 12.5 mg, oral, Daily  midodrine, 15 mg, oral, TID with meals  nystatin, 1 Application, Topical, BID  piperacillin-tazobactam, 2.25 g, intravenous, q6h  simvastatin, 40 mg, oral, Nightly  vancomycin, 125 mg, oral, 4x daily      Continuous medications     PRN medications  PRN medications: acetaminophen, acetaminophen, albumin human, dextrose 10 % in water (D10W), dextrose, glucagon, polyethylene glycol, zinc oxide    XR chest 1 view    Result Date: 12/14/2023  Interpreted By:  Judd Lee, STUDY: XR CHEST 1 VIEW; 12/14/2023 10:43 am   INDICATION: Hypoxic respiratory failure.   COMPARISON: December 8, 2023   ACCESSION NUMBER(S): [id number]   ORDERING CLINICIAN: RODRI SURESH   FINDINGS: RESULT: Right-sided double-lumen dialysis catheter is noted with distal tip at the level of the right atrium. Vascular stents are noted in the left subclavian region. Surgical clips are noted in the left axilla. Cardiac silhouette size is indeterminate as the left heart border is obscured. There are calcifications involving the thoracic aorta. Left basilar obscurity obscures the left hemidiaphragm and left heart border with hazy opacity extending to the left mid lung. There are degenerative changes of the bilateral shoulders.        Increasing density in the left mid and lower lung suggests increasing infiltrate or pleural effusion.     Signed by: Judd Lee 12/14/2023 10:58 AM Dictation workstation:   [id number]    Vascular US ankle brachial index (OCTAVIO) without exercise    Result Date: 12/13/2023           [place] [id number]            Phone [phone]  Vascular Lab Report  VASC US ANKLE BRACHIAL INDEX (OCTAVIO) WITHOUT EXERCISE Patient Name:      BASIA Gray Physician: [id number] Lizet Messer MD Study Date:        12/11/2023          Ordering Provider: [id number] MARIANN SUTTON MRN/PID:           [id number]            Fellow: Accession#:        [id number]        Technologist:      Luz Maria Trivedi RVT Date of Birth/Age: 1950 / 73 years Technologist 2:    Dara Parr RVT Gender:            F                   Encounter#:        [id number] Admission Status:  Inpatient           Location           [facility]                                        Performed:  Diagnosis/ICD: Other specified symptoms and signs involving the circulatory and                respiratory systems-R09.89 Indication:    Peripheral vascular disease CPT Codes:     36596.52 Peripheral artery OCTAVIO Only Reduced Service  Pertinent History: Absent pedal pulses. Patient is on dialysis.  CONCLUSIONS: Right Lower PVR: Evidence of moderate arterial occlusive disease in the right lower extremity at rest. Right pressures of >220 mmHg suggest no compressibility of vessels and may make absolute Segmental Limb Pressures (SLP) unreliable. Decreased digital perfusion noted. Biphasic flow is noted in the right dorsalis pedis artery. Left Lower PVR: Evidence of moderate arterial occlusive disease in the left lower extremity at rest. Left pressures of >220 mmHg suggest no compressibility of vessels and may make absolute Segmental Limb Pressures (SLP)  unreliable. Decreased digital perfusion noted. Biphasic flow is noted in the left dorsalis pedis artery. Additional Findings: Technically difficult and limited exam due to patient's positioning, movement and inability to cooperate with exam.  Imaging & Doppler Findings:  RIGHT Lower PVR              Pressures Right Dorsalis Pedis (Ankle) 255 mmHg   LEFT Lower PVR              Pressures Left Dorsalis Pedis (Ankle) 255 mmHg   [id number] Lizet Messer MD Electronically signed by [id number] Lizet Messer MD on 12/13/2023 at 8:38:56 AM  ** Final **     CT head wo IV contrast    Result Date: 12/11/2023  Interpreted By:  Brendon Hook, STUDY: CT HEAD WO IV CONTRAST; 12/11/2023 5:20 pm   INDICATION: Signs/Symptoms:acute confusion.   COMPARISON: 20 August 2023   ACCESSION NUMBER(S): [id number]   ORDERING CLINICIAN: RODRI SURESH   TECHNIQUE: CT was performed with one or more of the following dose reduction techniques: automated exposure control, adjustment of the mA and/or kV according to patient size, or use of iterative reconstruction technique.       PROCEDURE: 3.0 mm axial images were obtained through the brain, to include the posterior fossa without intravenous contrast enhancement.   FINDINGS: The patient's head is turned to the left and slightly canted in the gantry. Mild motion artifact is seen. There is symmetric volume loss of the cerebral hemispheres. Moderate  decreased attenuation in the bilateral periventricular white matter, consistent with microangiopathy is noted.  There is no encephalomalacic change. Brainstem is unremarkable. Cerebellar hemispheres are symmetric. No subarachnoid, intraparenchymal, subdural or interventricular hemorrhage. No intra- or extra-axial mass or abnormal blood products are demonstrated. Hyperostosis frontalis interna is seen. Slight hypoplasia of the left mastoid.   The paranasal sinuses and mastoids as well as calvarium are unremarkable.       1. No acute intracranial findings. 2.  Symmetric volume loss of the cerebral hemispheres. 3. Periventricular Microangiopathy. 4. No posttraumatic abnormality.   This report has been produced using speech recognition. This exam is available in DICOM format to non-affiliated healthcare facilities on a secure media free searchable basis with prior patient authorization. The patient exposure is reported to a radiation dose index registry. All CT examinations are performed with one or more of the following dose reduction techniques: Automated Exposure Control, Adjustment of mA and/or KV according to patient size, or use of iterative reconstruction techniques.   MACRO: None   Signed by: Brendon Hook 12/11/2023 7:05 PM Dictation workstation:   [id number]    US thoracentesis    Result Date: 12/8/2023  Interpreted By:  Sandro Hernandez, STUDY: US THORACENTESIS; 12/7/2023 3:16 pm   INDICATION: Signs/Symptoms:Left pleural effusion question of whether this needs thoracentesis, history of ESRD, just had dialysis today.   COMPARISON: None   ACCESSION NUMBER(S): [id number]   ORDERING CLINICIAN: JEZ LONG   TECHNIQUE: Informed consent obtained. Patient positionedsitting upright. Skin prepped, draped and anesthetized. Under ultrasound guidance, a centesis catheter/needle was advanced into rightpleural cavity.   FINDINGS: A total of 750 cc of clear yellow fluid was aspirated. A sample was sent for analysis. The patient tolerated the procedure well.       Ultrasound-guided left thoracentesis.     Signed by: Sandro Hernandez 12/8/2023 3:01 PM Dictation workstation:   [id number]    XR chest 1 view    Result Date: 12/8/2023  Interpreted By:  Maria Garcai, STUDY: XR CHEST 1 VIEW 12/8/2023 11:57 am   INDICATION: Signs/Symptoms:S/P thoracentesis   COMPARISON: 12/06/2023   ACCESSION NUMBER(S): [id number]   ORDERING CLINICIAN: JEZ LONG   TECHNIQUE: AP erect view of the chest at bedside   FINDINGS: There is an interval decrease in size of left pleural effusion when  compared with the study done 2 days earlier. No pneumothorax is seen.   There is some residual left pleural effusion with infiltrates seen in the left mid and lower lung field. The right lung is clear.   There are surgical clips within the left axilla. Stent grafts are visible in the left subclavian region extending into the left axillary region. A tunneled right jugular dialysis catheter terminates within right atrium.       Decreased size of left effusion following ultrasound-guided thoracentesis with no pneumothorax.   There is some residual left pleural effusion noted with infiltrate in the left mid and lower lung field.   Signed by: Maria Garcia 12/8/2023 12:51 PM Dictation workstation:   [id number]    XR foot 3+ views bilateral    Result Date: 12/7/2023  Interpreted By:  Brendon Hook, STUDY: XR FOOT 3+ VIEWS BILATERAL; 12/7/2023 3:31 pm   INDICATION: Signs/Symptoms:Wound/Abscess/Acute neha process rule out/Comparative Exam.   COMPARISON: None   ACCESSION NUMBER(S): [id number]   ORDERING CLINICIAN: JOSSELYN STAHL   TECHNIQUE: Nonweightbearing AP, lateral and oblique views of the bilateral foot were performed.   FINDINGS: Right foot: Large heel spur is seen. Mild Achilles enthesopathy demonstrated. Moderate atherosclerotic disease is seen. There is suggestion of loss of the arch on this nonweightbearing view. Degenerative change anterior plafond demonstrated and dorsal talus as well as posterior plafond and dorsal talus. Sclerosis anterior calcaneus is seen. Degenerative changes mild between the midfoot and forefoot. 1st metatarsophalangeal joint degenerative changes demonstrated. 2nd ray absent phalanges. Distal phalanges of the 3rd through 5th rays with overlap were difficult to visualize. Distal phalanx of the great toe is difficult to visualize.   Left foot: Large heel spur is seen. Advanced atherosclerotic disease demonstrated. Degenerative change in the calcaneus and talus seen with moderate anterior  and mild posterior plafond degenerative change. Mild degenerative change in the midfoot and forefoot is seen. Distal phalanx of the great toe is absent. Degenerative change of the 2nd ray phalanges and 3rd ray phalanges seen with degenerative change of the 4th and 5th digits not well seen.       1. Right foot: Large heel spur. Atherosclerotic disease. Suggestion of diffuse osteopenia. No sclerosis to suggest osteomyelitis. No subcutaneous air to suggest gangrene. Absent flanges 2nd ray with difficult to visualized distal phalanges of the other digits.   2. Left foot: Large heel spur. Degenerative changes of the hindfoot midfoot and forefoot with atherosclerotic disease. Absent distal phalanx of the great toe. No periostitis or abnormal sclerosis with diffuse osteopenia to suggest osteomyelitis. No subcutaneous air to suggest gangrene.       Signed by: Brendon Hook 12/7/2023 8:39 PM Dictation workstation:   [id number]    ECG 12 Lead    Result Date: 12/7/2023  Poor data quality, interpretation may be adversely affected Normal sinus rhythm Right bundle branch block Septal infarct , age undetermined Possible Lateral infarct , age undetermined Abnormal ECG No previous ECGs available Confirmed by Hammad Callahan ([id number]) on 12/7/2023 1:07:11 PM    XR chest 1 view    Result Date: 12/6/2023  Interpreted By:  Roger Murdock, STUDY: XR CHEST 1 VIEW;  12/6/2023 2:08 pm   INDICATION: Signs/Symptoms:Brief AMS and hypotension, now resolved.   COMPARISON: 08/21/2023..   ACCESSION NUMBER(S): [id number]   ORDERING CLINICIAN: PATEL TINSLEY   FINDINGS: New moderate left pleural effusion. There is silhouetting of the left heart border. Left subclavian stent present. Right-sided double-lumen central venous catheter terminates within the right ventricle. No acute osseous findings.       New, moderate left pleural effusion.     MACRO: None   Signed by: Roger Murdock 12/6/2023 2:18 PM Dictation workstation:   [id number]     Results for orders  placed or performed during the hospital encounter of 12/06/23 (from the past 24 hour(s))   Lactate Dehydrogenase, Fluid   Result Value Ref Range    LD, Fluid 47 Not established. U/L   Glucose, Fluid   Result Value Ref Range    Glucose, Fluid 128 Not established mg/dL   Protein, Total Fluid   Result Value Ref Range    Protein, Total Fluid 1.5 Not established g/dL   POCT GLUCOSE   Result Value Ref Range    POCT Glucose 142 (H) 74 - 99 mg/dL   POCT GLUCOSE   Result Value Ref Range    POCT Glucose 122 (H) 74 - 99 mg/dL   Basic metabolic panel   Result Value Ref Range    Glucose 117 (H) 65 - 99 mg/dL    Sodium 134 133 - 145 mmol/L    Potassium 3.2 (L) 3.4 - 5.1 mmol/L    Chloride 100 97 - 107 mmol/L    Bicarbonate 24 24 - 31 mmol/L    Urea Nitrogen 14 8 - 25 mg/dL    Creatinine 2.10 (H) 0.40 - 1.60 mg/dL    eGFR 25 (L) >60 mL/min/1.73m*2    Calcium 8.2 (L) 8.5 - 10.4 mg/dL    Anion Gap 10 <=19 mmol/L   CBC   Result Value Ref Range    WBC 9.0 4.4 - 11.3 x10*3/uL    nRBC 0.0 0.0 - 0.0 /100 WBCs    RBC 3.79 (L) 4.00 - 5.20 x10*6/uL    Hemoglobin 10.8 (L) 12.0 - 16.0 g/dL    Hematocrit 35.1 (L) 36.0 - 46.0 %    MCV 93 80 - 100 fL    MCH 28.5 26.0 - 34.0 pg    MCHC 30.8 (L) 32.0 - 36.0 g/dL    RDW 21.8 (H) 11.5 - 14.5 %    Platelets 97 (L) 150 - 450 x10*3/uL   POCT GLUCOSE   Result Value Ref Range    POCT Glucose 93 74 - 99 mg/dL   POCT GLUCOSE   Result Value Ref Range    POCT Glucose 105 (H) 74 - 99 mg/dL       Assessment/Plan   Principal Problem:    Hypotension, unspecified hypotension type  Active Problems:    Paroxysmal atrial fibrillation (CMS/HCC)    Peripheral vascular disease (CMS/HCC)    Orthostatic hypotension    Mixed hyperlipidemia    End-stage renal disease on hemodialysis (CMS/HCC)    DM (diabetes mellitus) (CMS/HCC)    Absent pedal pulses       Plan  Continue antibiotics, infectious disease following  Continue Duo Nebs  Incentive spirometry/pulmonary hygiene  Prophylaxis  PT/OT/out of bed  Collaborating with  Dr. Engle  Discharge planning-skilled nursing facility    I spent 15 minutes in the professional and overall care of this patient.      MADHURI Tapia-[facility]

## 2023-12-16 NOTE — PROCEDURES
Central Line    Date/Time: 12/16/2023 5:57 PM    Performed by: Klaudia Figueroa DO  Authorized by: Klaudia Figueroa DO    Consent:     Consent obtained:  Written    Consent given by:  Spouse  Pre-procedure details:     Indication(s): central venous access and insufficient peripheral access      Hand hygiene: Hand hygiene performed prior to insertion      Sterile barrier technique: All elements of maximal sterile technique followed      Skin preparation:  Chlorhexidine    Skin preparation agent: Skin preparation agent completely dried prior to procedure    Sedation:     Sedation type:  None  Anesthesia:     Anesthesia method:  Local infiltration    Local anesthetic:  Lidocaine 1% w/o epi  Procedure details:     Location:  R femoral    Patient position:  Supine    Procedural supplies:  Triple lumen    Landmarks identified: yes      Ultrasound guidance: yes      Ultrasound guidance timing: real time      Sterile ultrasound techniques: Sterile gel and sterile probe covers were used      Number of attempts:  1    Successful placement: yes    Post-procedure details:     Post-procedure:  Dressing applied and line sutured    Assessment:  Blood return through all ports and free fluid flow    Procedure completion:  Tolerated  Comments:      My hands were  washed immediately prior to the procedure. I wore a surgical cap, mask  with protective eyewear, sterile gown and sterile gloves throughout the  procedure. The right inguinal region was prepped using  chlorhexidine scrub and draped in sterile fashion using a full drape and  sterile probe cover and sterile gel employed. The femoral vein was identified under ultrasound guidance. Under ultrasound guidance, the introducer needle was  inserted into the femoral vein. Venous blood was withdrawn. The syringe was  removed and a guidewire was advanced into the introducer needle. A small  incision was made at the skin surface with a scalpel and the introducer  needle was  exchanged for a dilator over the guidewire. After  appropriate dilation was obtained, the dilator was exchanged over the  wire for a 20 cm central venous catheter. The wire was removed and the  catheter was sutured in place at 20 cm. A sterile sorbaview shield was  placed over the catheter at the insertion site. The patient tolerated  the procedure without any hemodynamic compromise. At time of procedure  completion, all ports aspirated and flushed properly.  Estimated blood  loss is 5 ml.

## 2023-12-16 NOTE — SIGNIFICANT EVENT
Patient with issues throughout her hospital stay with hypotension. BP cuff checks have been consistently variable-- at times in the 60s and others in the 100s systolics. She has been mentating well throughout most of the stay. Today BP seemingly lower but again, no accurate BP's able to be checked. Albumin q8 hr x3 doses was ordered but patient lost IV access and unable to get new peripheral. I evaluated patient, and she did not seem alert enough to consent or understand what was going on. I transferred her to ICU. I placed central and arterial lines. Art line SBP mostly in the 70s with MAP in the 50s. I discussed with Dr. Mondragon. Will still give albumin as mentioned above. Start levophed to maintain SBP >80. Lactate ordered and repeat blood cultures sent.    I spoke with patient's , Hossein. He is updated of the situation. He consented for central and arterial lines.      CCT: 33 min exclusive of any procedures.

## 2023-12-16 NOTE — CARE PLAN
The clinical goals for the shift include maintain comfort and safety, decreased shortness of breath  The patient was able to achieve these goals. PRN acetaminophen for chronic aches was effective. She was able to remain within acceptable SP02 parameters on room air.     Over the shift, the patient did  progress toward the following goals:  Problem: Pain - Adult  Goal: Verbalizes/displays adequate comfort level or baseline comfort level  Outcome: Progressing     Problem: Diabetes  Goal: Achieve decreasing blood glucose levels by end of shift  Outcome: Progressing     Problem: Skin  Goal: Promote skin healing  Flowsheets  Taken 12/15/2023 2340  Promote skin healing:   Assess skin/pad under line(s)/device(s)   Ensure correct size (line/device) and apply per  instructions   Protective dressings over bony prominences   Turn/reposition every 2 hours/use positioning/transfer devices   Rotate device position/do not position patient on device

## 2023-12-16 NOTE — PROCEDURES
Insert arterial line    Date/Time: 12/16/2023 5:55 PM    Performed by: Klaudia Figueroa DO  Authorized by: Klaudia Figueroa DO    Consent:     Consent obtained:  Written    Consent given by:  Spouse  Indications:     Indications: hemodynamic monitoring    Pre-procedure details:     Skin preparation:  Chlorhexidine    Preparation: Patient was prepped and draped in sterile fashion    Sedation:     Sedation type:  None  Anesthesia:     Anesthesia method:  Local infiltration    Local anesthetic:  Lidocaine 1% w/o epi  Procedure details:     Location:  R femoral    Paul's test performed: no      Needle gauge:  18 G    Placement technique:  Ultrasound guided and Seldinger    Number of attempts:  1    Transducer: waveform confirmed    Post-procedure details:     Post-procedure:  Sterile dressing applied    CMS:  Normal    Procedure completion:  Tolerated  Comments:      My hands were washed immediately prior to the  procedure. I wore a surgical cap, mask with protective eyewear, and sterile gloves throughout the procedure. The R inguinal  region was prepped using chlorhexidine scrub and draped in sterile  fashion using a drape The  femoral artery was identified under ultrasound guidance, using a sterile probe cover. Anesthesia was achieved using 1%  lidocaine. Under real time ultrasound guidance, the introducer needle was inserted into the femoral artery. Arterial blood  was withdrawn. The syringe was removed and a guidewire was advanced  through the needle into the femoral artery. The needle was exchanged  over the wire for an arterial catheter. The wire was removed and the  catheter was secured to the skin using a suture. The patient tolerated  the procedure without any hemodynamic compromise. At time of procedure  completion, the catheter was connected to the cardiac monitor and  calibrated. Appropriate waveform and blood pressure tracing was  observed. Estimated blood loss is minimal.

## 2023-12-16 NOTE — PROGRESS NOTES
Ayanna Gross is a 73 y.o. female on day 10 of admission presenting with Hypotension, unspecified hypotension type.    Subjective   Interval History:    room not entered-limit exposure  Patient discussed with primary team   Afebrile   on room air        Review of Systems    Objective   Range of Vitals (last 24 hours)  Heart Rate:  [67-83]   Temp:  [36.1 °C (97 °F)-36.7 °C (98.1 °F)]   Resp:  [14-23]   BP: ()/(18-78)   SpO2:  [92 %-100 %]   Daily Weight  12/15/23 : 70.9 kg (156 lb 4.9 oz)    Body mass index is 28.58 kg/m².    Physical Exam   resting comfortably    Antibiotics  sodium chloride 0.9 % bolus 500 mL  cefepime (Maxipime) 1 g in dextrose 5 % 50 mL IV  vancomycin-diluent combo no.1 (Xellia) IVPB 1 g  apixaban (Eliquis) tablet 2.5 mg  calcium acetate (Phoslo) capsule 2,000 mg  escitalopram (Lexapro) tablet 10 mg  febuxostat (Uloric) tablet 40 mg  fenofibrate (Triglide) tablet 160 mg  gabapentin (Neurontin) capsule 300 mg  metoprolol succinate XL (Toprol-XL) 24 hr tablet 12.5 mg  midodrine (Proamatine) tablet 10 mg  nystatin (Mycostatin) 100,000 unit/gram powder 1 Application  B complex-vitamin C tablet 1 tablet  simvastatin (Zocor) tablet 40 mg  acetaminophen (Tylenol) tablet 650 mg  acetaminophen (Tylenol) tablet 650 mg  polyethylene glycol (Glycolax, Miralax) packet 17 g  dextrose 50 % injection 25 g  glucagon (Glucagen) injection 1 mg  dextrose 10 % in water (D10W) infusion  insulin lispro (HumaLOG) injection 0-5 Units  zinc oxide 20 % ointment 1 Application  calcium acetate (Phoslo) capsule 667 mg  vancomycin (Vancocin) capsule 125 mg  lidocaine PF (Xylocaine) 10 mg/mL (1 %) injection  heparin 1,000 unit/mL injection 2,000 Units  heparin 1,000 unit/mL injection 2,000 Units  midodrine (Proamatine) tablet 15 mg  albumin human 25 % solution 25 g  polyethylene glycol (Glycolax, Miralax) packet 17 g  collagenase 250 unit/gram ointment  vancomycin (Vancocin) 1,750 mg in dextrose 5 % in water (D5W) 250 mL  IV  vancomycin-diluent combo no.1 (Xellia) IVPB 1,750 mg  albumin human 25 % solution 12.5 g  heparin 1,000 unit/mL injection 2,000 Units  heparin 1,000 unit/mL injection 2,000 Units  vancomycin (Vancocin) placeholder  doxycycline (Vibramycin) capsule 100 mg  potassium chloride CR (Klor-Con) ER tablet 10 mEq  gabapentin (Neurontin) capsule 100 mg  heparin 1,000 unit/mL injection 2,000 Units  heparin 1,000 unit/mL injection 2,000 Units  piperacillin-tazobactam-dextrose (Zosyn) IV 2.25 g  doxycycline (Vibramycin) capsule 100 mg  ipratropium-albuteroL (Duo-Neb) 0.5-2.5 mg/3 mL nebulizer solution 3 mL  heparin 1,000 unit/mL injection 2,000 Units  heparin 1,000 unit/mL injection 2,000 Units  ipratropium-albuteroL (Duo-Neb) 0.5-2.5 mg/3 mL nebulizer solution 3 mL  dexAMETHasone (Decadron) injection 6 mg  albumin human 25 % solution 25 g  epoetin di-epbx (Retacrit) injection 10,000 Units  lidocaine PF (Xylocaine) 20 mg/mL (2 %) injection  potassium chloride CR (Klor-Con) ER tablet 10 mEq      Relevant Results  Labs  Results from last 72 hours   Lab Units 12/16/23  0516 12/14/23  0454   WBC AUTO x10*3/uL 9.0 17.9*   HEMOGLOBIN g/dL 10.8* 11.5*   HEMATOCRIT % 35.1* 34.8*   PLATELETS AUTO x10*3/uL 97* 79*     Results from last 72 hours   Lab Units 12/16/23  0516 12/14/23  0454   SODIUM mmol/L 134 134   POTASSIUM mmol/L 3.2* 3.2*   CHLORIDE mmol/L 100 97   CO2 mmol/L 24 26   BUN mg/dL 14 11   CREATININE mg/dL 2.10* 2.60*   GLUCOSE mg/dL 117* 149*   CALCIUM mg/dL 8.2* 8.6   ANION GAP mmol/L 10 11   EGFR mL/min/1.73m*2 25* 19*         Estimated Creatinine Clearance: 22.7 mL/min (A) (by C-G formula based on SCr of 2.1 mg/dL (H)).  CRP   Date Value Ref Range Status   06/23/2023 19.9 (H) 0 - 2.0 MG/DL Final     Comment:     Performed at [address] [address]   05/22/2022 1.0 0 - 2.0 MG/DL Final     Comment:     Performed at [address] [address]   12/13/2020 10.1 (H) 0 - 2.0 MG/DL  Final     Comment:     Performed at [place] [address]     Microbiology   reviewed  Imaging  XR chest 1 view    Result Date: 12/14/2023  Interpreted By:  Judd Lee, STUDY: XR CHEST 1 VIEW; 12/14/2023 10:43 am   INDICATION: Hypoxic respiratory failure.   COMPARISON: December 8, 2023   ACCESSION NUMBER(S): [id number]   ORDERING CLINICIAN: RODRI SURESH   FINDINGS: RESULT: Right-sided double-lumen dialysis catheter is noted with distal tip at the level of the right atrium. Vascular stents are noted in the left subclavian region. Surgical clips are noted in the left axilla. Cardiac silhouette size is indeterminate as the left heart border is obscured. There are calcifications involving the thoracic aorta. Left basilar obscurity obscures the left hemidiaphragm and left heart border with hazy opacity extending to the left mid lung. There are degenerative changes of the bilateral shoulders.       Increasing density in the left mid and lower lung suggests increasing infiltrate or pleural effusion.     Signed by: Judd Lee 12/14/2023 10:58 AM Dictation workstation:   [id number]    Vascular US ankle brachial index (OCTAVIO) without exercise    Result Date: 12/13/2023           [place] [address]            Phone [phone]  Vascular Lab Report  [facility] US ANKLE BRACHIAL INDEX (OCTAVIO) WITHOUT EXERCISE Patient Name:      BASIA Gray Physician: [id number] Lizet Messer MD Study Date:        12/11/2023          Ordering Provider: [id number] SUTTON MRN/PID:           [id number]            Fellow: Accession#:        [id number]        Technologist:      Luz Maria Trivedi RVT Date of Birth/Age: 1950 / 73 years Technologist 2:    Dara Parr RVT Gender:            F                   Encounter#:        [id number] Admission Status:  Inpatient           Location            [facility]                                        Performed:  Diagnosis/ICD: Other specified symptoms and signs involving the circulatory and                respiratory systems-R09.89 Indication:    Peripheral vascular disease CPT Codes:     87566.52 Peripheral artery OCTAVIO Only Reduced Service  Pertinent History: Absent pedal pulses. Patient is on dialysis.  CONCLUSIONS: Right Lower PVR: Evidence of moderate arterial occlusive disease in the right lower extremity at rest. Right pressures of >220 mmHg suggest no compressibility of vessels and may make absolute Segmental Limb Pressures (SLP) unreliable. Decreased digital perfusion noted. Biphasic flow is noted in the right dorsalis pedis artery. Left Lower PVR: Evidence of moderate arterial occlusive disease in the left lower extremity at rest. Left pressures of >220 mmHg suggest no compressibility of vessels and may make absolute Segmental Limb Pressures (SLP) unreliable. Decreased digital perfusion noted. Biphasic flow is noted in the left dorsalis pedis artery. Additional Findings: Technically difficult and limited exam due to patient's positioning, movement and inability to cooperate with exam.  Imaging & Doppler Findings:  RIGHT Lower PVR              Pressures Right Dorsalis Pedis (Ankle) 255 mmHg   LEFT Lower PVR              Pressures Left Dorsalis Pedis (Ankle) 255 mmHg   [id number] Lizet Messer MD Electronically signed by [id number] Lizet Mseser MD on 12/13/2023 at 8:38:56 AM  ** Final **     CT head wo IV contrast    Result Date: 12/11/2023  Interpreted By:  Brendon Hook, STUDY: CT HEAD WO IV CONTRAST; 12/11/2023 5:20 pm   INDICATION: Signs/Symptoms:acute confusion.   COMPARISON: 20 August 2023   ACCESSION NUMBER(S): [id number]   ORDERING CLINICIAN: RODRI SURESH   TECHNIQUE: CT was performed with one or more of the following dose reduction techniques: automated exposure control, adjustment of the mA and/or kV according to patient size, or use of  iterative reconstruction technique.       PROCEDURE: 3.0 mm axial images were obtained through the brain, to include the posterior fossa without intravenous contrast enhancement.   FINDINGS: The patient's head is turned to the left and slightly canted in the gantry. Mild motion artifact is seen. There is symmetric volume loss of the cerebral hemispheres. Moderate  decreased attenuation in the bilateral periventricular white matter, consistent with microangiopathy is noted.  There is no encephalomalacic change. Brainstem is unremarkable. Cerebellar hemispheres are symmetric. No subarachnoid, intraparenchymal, subdural or interventricular hemorrhage. No intra- or extra-axial mass or abnormal blood products are demonstrated. Hyperostosis frontalis interna is seen. Slight hypoplasia of the left mastoid.   The paranasal sinuses and mastoids as well as calvarium are unremarkable.       1. No acute intracranial findings. 2. Symmetric volume loss of the cerebral hemispheres. 3. Periventricular Microangiopathy. 4. No posttraumatic abnormality.   This report has been produced using speech recognition. This exam is available in DICOM format to non-affiliated healthcare facilities on a secure media free searchable basis with prior patient authorization. The patient exposure is reported to a radiation dose index registry. All CT examinations are performed with one or more of the following dose reduction techniques: Automated Exposure Control, Adjustment of mA and/or KV according to patient size, or use of iterative reconstruction techniques.   MACRO: None   Signed by: Brendon Hook 12/11/2023 7:05 PM Dictation workstation:   [id number]    US thoracentesis    Result Date: 12/8/2023  Interpreted By:  Sandro Hernandez, STUDY: US THORACENTESIS; 12/7/2023 3:16 pm   INDICATION: Signs/Symptoms:Left pleural effusion question of whether this needs thoracentesis, history of ESRD, just had dialysis today.   COMPARISON: None   ACCESSION  NUMBER(S): [id number]   ORDERING CLINICIAN: JEZ LONG   TECHNIQUE: Informed consent obtained. Patient positionedsitting upright. Skin prepped, draped and anesthetized. Under ultrasound guidance, a centesis catheter/needle was advanced into rightpleural cavity.   FINDINGS: A total of 750 cc of clear yellow fluid was aspirated. A sample was sent for analysis. The patient tolerated the procedure well.       Ultrasound-guided left thoracentesis.     Signed by: Sandro Hernandez 12/8/2023 3:01 PM Dictation workstation:   [id number]    XR chest 1 view    Result Date: 12/8/2023  Interpreted By:  Maria Garcia, STUDY: XR CHEST 1 VIEW 12/8/2023 11:57 am   INDICATION: Signs/Symptoms:S/P thoracentesis   COMPARISON: 12/06/2023   ACCESSION NUMBER(S): [id number]   ORDERING CLINICIAN: JEZ LONG   TECHNIQUE: AP erect view of the chest at bedside   FINDINGS: There is an interval decrease in size of left pleural effusion when compared with the study done 2 days earlier. No pneumothorax is seen.   There is some residual left pleural effusion with infiltrates seen in the left mid and lower lung field. The right lung is clear.   There are surgical clips within the left axilla. Stent grafts are visible in the left subclavian region extending into the left axillary region. A tunneled right jugular dialysis catheter terminates within right atrium.       Decreased size of left effusion following ultrasound-guided thoracentesis with no pneumothorax.   There is some residual left pleural effusion noted with infiltrate in the left mid and lower lung field.   Signed by: Maria Garcia 12/8/2023 12:51 PM Dictation workstation:   [id number]    XR foot 3+ views bilateral    Result Date: 12/7/2023  Interpreted By:  Brendon Hook, STUDY: XR FOOT 3+ VIEWS BILATERAL; 12/7/2023 3:31 pm   INDICATION: Signs/Symptoms:Wound/Abscess/Acute neha process rule out/Comparative Exam.   COMPARISON: None   ACCESSION NUMBER(S): [id number]   ORDERING  CLINICIAN: JOSSELYN STAHL   TECHNIQUE: Nonweightbearing AP, lateral and oblique views of the bilateral foot were performed.   FINDINGS: Right foot: Large heel spur is seen. Mild Achilles enthesopathy demonstrated. Moderate atherosclerotic disease is seen. There is suggestion of loss of the arch on this nonweightbearing view. Degenerative change anterior plafond demonstrated and dorsal talus as well as posterior plafond and dorsal talus. Sclerosis anterior calcaneus is seen. Degenerative changes mild between the midfoot and forefoot. 1st metatarsophalangeal joint degenerative changes demonstrated. 2nd ray absent phalanges. Distal phalanges of the 3rd through 5th rays with overlap were difficult to visualize. Distal phalanx of the great toe is difficult to visualize.   Left foot: Large heel spur is seen. Advanced atherosclerotic disease demonstrated. Degenerative change in the calcaneus and talus seen with moderate anterior and mild posterior plafond degenerative change. Mild degenerative change in the midfoot and forefoot is seen. Distal phalanx of the great toe is absent. Degenerative change of the 2nd ray phalanges and 3rd ray phalanges seen with degenerative change of the 4th and 5th digits not well seen.       1. Right foot: Large heel spur. Atherosclerotic disease. Suggestion of diffuse osteopenia. No sclerosis to suggest osteomyelitis. No subcutaneous air to suggest gangrene. Absent flanges 2nd ray with difficult to visualized distal phalanges of the other digits.   2. Left foot: Large heel spur. Degenerative changes of the hindfoot midfoot and forefoot with atherosclerotic disease. Absent distal phalanx of the great toe. No periostitis or abnormal sclerosis with diffuse osteopenia to suggest osteomyelitis. No subcutaneous air to suggest gangrene.       Signed by: Brendon Hook 12/7/2023 8:39 PM Dictation workstation:   [id number]    ECG 12 Lead    Result Date: 12/7/2023   Poor data quality, interpretation  may be adversely affected Normal sinus rhythm Right bundle branch block Septal infarct , age undetermined Possible Lateral infarct , age undetermined Abnormal ECG No previous ECGs available Confirmed by Hammad Callahan ([id number]) on 12/7/2023 1:07:11 PM    XR chest 1 view    Result Date: 12/6/2023  Interpreted By:  Roger Murdock, STUDY: XR CHEST 1 VIEW;  12/6/2023 2:08 pm   INDICATION: Signs/Symptoms:Brief AMS and hypotension, now resolved.   COMPARISON: 08/21/2023..   ACCESSION NUMBER(S): [id number]   ORDERING CLINICIAN: PATEL TINSLEY   FINDINGS: New moderate left pleural effusion. There is silhouetting of the left heart border. Left subclavian stent present. Right-sided double-lumen central venous catheter terminates within the right ventricle. No acute osseous findings.       New, moderate left pleural effusion.     MACRO: None   Signed by: Roger Murdock 12/6/2023 2:18 PM Dictation workstation:   [id number]     Assessment/Plan     Unstageable Bilateral heel ulcers  C-difficile infection-resolving  Type 2 diabetes mellitus with peripheral angiopathy with gangrene   acute respiratory failure-resolved  Coronavirus infection  Pleural effusion, s/p thoracentesis  History of chronic MRSA bacteremia, infective endocarditis-completed IV vancomycin, now on Doxycycline   Hypotension-resolved  ESRD on hemodialysis  Severe malnutrition-prealbumin of 3.2           Continue Oral vancomycin-treatment for C. difficile-total of 14 days  Oral doxycycline as suppressive therapy for MRSA bacteremia with possible infective endocarditis  Continue Zosyn-empiric therapy  Monitor temperature WBC  Local care  Offloading  High-protein diet  Monitor stool  Podiatry follow up  Follow-up MRSA PCR   droplet plus precautions  Contact plus precautions    Stephen Coulter MD

## 2023-12-17 NOTE — PROGRESS NOTES
Critical Care Progress Note    Ayanna Gross is a 73 y.o. female on day 11 of admission presenting with Hypotension, unspecified hypotension type.    Subjective   Transferred to ICU for hypotension.  Unable to get accurate blood pressures with cuff.  Decreased level of mentation.    Objective   Vital Signs      12/17/2023     5:00 AM 12/17/2023     5:15 AM 12/17/2023     5:30 AM 12/17/2023     5:45 AM 12/17/2023     6:00 AM 12/17/2023     6:15 AM 12/17/2023     6:30 AM   Vitals   Heart Rate 86 80 78 83 81 81 86   Resp 25 18 17 14 15 15 20   Weight (lb) 143.74         BMI 26.28 kg/m2         BSA (m2) 1.69 m2             Oxygen Therapy  SpO2: 99 %  Medical Gas Therapy: Supplemental oxygen  O2 Delivery Method: Nasal cannula         Intake/Output previous 24 hours:    Intake/Output Summary (Last 24 hours) at 12/17/2023 0729  Last data filed at 12/17/2023 0513  Gross per 24 hour   Intake 616.57 ml   Output --   Net 616.57 ml       Physical Exam  Constitutional:       Appearance: Normal appearance.   HENT:      Head: Normocephalic and atraumatic.   Eyes:      Extraocular Movements: Extraocular movements intact.      Pupils: Pupils are equal, round, and reactive to light.   Cardiovascular:      Rate and Rhythm: Normal rate and regular rhythm.      Pulses: Normal pulses.      Heart sounds: Normal heart sounds.   Pulmonary:      Effort: Pulmonary effort is normal.      Breath sounds: Wheezing and rhonchi present.   Abdominal:      General: Abdomen is flat. Bowel sounds are normal.      Palpations: Abdomen is soft.   Musculoskeletal:      Cervical back: Normal range of motion.      Right lower leg: Edema present.      Left lower leg: Edema present.   Neurological:      General: No focal deficit present.      Mental Status: She is alert.      Cranial Nerves: Cranial nerves 2-12 are intact.         Lines and Tubes:  CVC Double lumen Tunneled Right Subclavian (Active)   Earliest Known Present: 12/06/23   Lumen Type: Double  lumen  CVC Type: Tunneled  Description (optional): Permacath  Orientation: Right  Location: Subclavian   Number of days: 11       CVC 12/16/23 Triple lumen Right Femoral (Active)   Placement Date/Time: 12/16/23 (c) 1757   Hand Hygiene Performed Prior to CVC Insertion: Yes  Site Prep: Chlorhexidine   Site Prep Agent has Completely Dried Before Insertion: Yes  All 5 Sterile Barriers Used (Gloves, Gown, Cap, Mask, Large Sterile Leticia...   Number of days: 0       Arterial Line 12/16/23 Right Femoral (Active)   Placement Date/Time: 12/16/23 (c) 1755   Size: 18 G  Orientation: Right  Location: Femoral  Site Prep: Chlorhexidine   Local Anesthetic: Injectable  Insertion attempts: 1  Securement Method: Transparent dressing   Number of days: 0         Scheduled medications  albumin human, 25 g, intravenous, q8h  apixaban, 2.5 mg, oral, BID  calcium acetate, 667 mg, oral, TID with meals  collagenase, , Topical, Daily  doxycycline, 100 mg, oral, Daily  epoetin di or biosimilar, 10,000 Units, intravenous, Every Mon/Wed/Fri  escitalopram, 10 mg, oral, Daily  febuxostat, 40 mg, oral, Every other day  fenofibrate, 160 mg, oral, Daily  gabapentin, 100 mg, oral, BID  heparin, 2,000 Units, intra-catheter, After Dialysis  heparin, 2,000 Units, intra-catheter, After Dialysis  heparin, 2,000 Units, intra-catheter, After Dialysis  heparin, 2,000 Units, intra-catheter, After Dialysis  insulin lispro, 0-5 Units, subcutaneous, TID with meals  ipratropium-albuteroL, 3 mL, nebulization, TID  metoprolol succinate XL, 12.5 mg, oral, Daily  midodrine, 15 mg, oral, TID with meals  nystatin, 1 Application, Topical, BID  piperacillin-tazobactam, 2.25 g, intravenous, q6h  simvastatin, 40 mg, oral, Nightly  vancomycin, 125 mg, oral, 4x daily      Continuous medications  norepinephrine, 0.01-3 mcg/kg/min, Last Rate: 0.07 mcg/kg/min (12/17/23 0513)      PRN medications  PRN medications: acetaminophen, acetaminophen, albumin human, dextrose 10 % in water  (D10W), dextrose, glucagon, oxygen, polyethylene glycol, zinc oxide    Relevant Results  Results from last 7 days   Lab Units 12/16/23  0516 12/14/23  0454 12/13/23  0457   WBC AUTO x10*3/uL 9.0 17.9* 12.3*   HEMOGLOBIN g/dL 10.8* 11.5* 11.3*   HEMATOCRIT % 35.1* 34.8* 35.3*   PLATELETS AUTO x10*3/uL 97* 79* 121*      Results from last 7 days   Lab Units 12/16/23  0516 12/14/23  0454 12/13/23  0457   SODIUM mmol/L 134 134 138   POTASSIUM mmol/L 3.2* 3.2* 3.8   CHLORIDE mmol/L 100 97 100   CO2 mmol/L 24 26 27   BUN mg/dL 14 11 16   CREATININE mg/dL 2.10* 2.60* 3.40*   GLUCOSE mg/dL 117* 149* 139*   CALCIUM mg/dL 8.2* 8.6 8.7      Results from last 7 days   Lab Units 12/14/23  1158   POCT PH, ARTERIAL pH 7.48*   POCT PCO2, ARTERIAL mm Hg 42   POCT PO2, ARTERIAL mm Hg 103*   POCT HCO3 CALCULATED, ARTERIAL mmol/L 31.3*   POCT BASE EXCESS, ARTERIAL mmol/L 7.1*     XR chest 1 view 12/14/2023    Narrative  Interpreted By:  Judd Lee,  STUDY:  XR CHEST 1 VIEW; 12/14/2023 10:43 am    INDICATION:  Hypoxic respiratory failure.    COMPARISON:  December 8, 2023    ACCESSION NUMBER(S):  [id number]    ORDERING CLINICIAN:  RODRI SURESH    FINDINGS:  RESULT: Right-sided double-lumen dialysis catheter is noted with  distal tip at the level of the right atrium. Vascular stents are  noted in the left subclavian region. Surgical clips are noted in the  left axilla. Cardiac silhouette size is indeterminate as the left  heart border is obscured. There are calcifications involving the  thoracic aorta. Left basilar obscurity obscures the left  hemidiaphragm and left heart border with hazy opacity extending to  the left mid lung. There are degenerative changes of the bilateral  shoulders.    Impression  Increasing density in the left mid and lower lung suggests increasing  infiltrate or pleural effusion.      Signed by: Judd Lee 12/14/2023 10:58 AM  Dictation workstation:   [id number]      Patient Active Problem List    Diagnosis    Amputation of finger, except thumb    Tenosynovitis    Inflammatory dermatosis    Skin abnormality    Dry skin    Arthritis    Syncope    Swelling of finger    Sinusitis, acute    Bronchitis, acute    Second degree burn of single finger of left hand, not thumb    Rash    Postmenopausal bleeding    Pneumonia    Infection    Phlegmon    Paroxysmal atrial fibrillation (CMS/HCC)    Right arm pain    Pain in right shoulder    Skin changes due to chronic exposure to nonionizing radiation, unspecified    Other seborrheic keratosis    Inflamed seborrheic keratosis    Actinic keratosis    Open wound of finger    Obstructive sleep apnea    Obesity with body mass index 30 or greater    Nuclear senile cataract    Nonhealing surgical wound    Neoplasm of uncertain behavior of skin    Miosis    Mild nonproliferative diabetic retinopathy (CMS/HCC)    Lactic acidosis    Injury of head    Infection due to Escherichia coli    Hyponatremia    Peripheral vascular disease (CMS/HCC)    Orthostatic hypotension    Low blood pressure    Hypertension    History of breast cancer in female    Personal history of other malignant neoplasm of skin    High cholesterol    Mixed hyperlipidemia    Heart failure (CMS/HCC)    Headache    Growing pains    Gout    Chronic gout with tophus    Gastrointestinal hemorrhage    Fecal incontinence    Fatigue    Fall    Facial basal cell cancer    End-stage renal disease on hemodialysis (CMS/HCC)    Anemia of chronic renal failure    End stage renal disease (CMS/HCC)    Difficulty walking    Dyslipidemia    Diabetic foot ulcer (CMS/HCC)    Dermatochalasis of right upper eyelid    Dermatochalasis of left upper eyelid    Mixed anxiety and depressive disorder    Depression    Dependence on renal dialysis (CMS/HCC)    Contusion of face    Clouded consciousness    Chronic pain of both knees    Chronic osteomyelitis (CMS/HCC)    Chronic kidney disease, stage 3 (CMS/HCC)    Chronic kidney disease (CKD),  stage IV (severe) (CMS/HCC)    Chorioretinal scar    Cerebrovascular accident (CMS/HCC)    Breast cancer (CMS/HCC)    Arthritis of knee, right    Arthritis of knee, left    Anemia of renal disease    Altered mental status    Colon cancer screening    Cellulitis of finger    Loss of consciousness (CMS/HCC)    Tenosynovitis of fingers    Thyroid nodule    Tinea corporis    Toxic metabolic encephalopathy    DM (diabetes mellitus) (CMS/HCC)    Hypoglycemia    Type 2 diabetes mellitus (CMS/HCC)    Visual hallucinations    Vocal cord paralysis    Wound infection    Hypotension, unspecified hypotension type    Absent pedal pulses     Assessment/Plan     Principal Problem:    Hypotension, unspecified hypotension type  Active Problems:    Paroxysmal atrial fibrillation (CMS/HCC)    Peripheral vascular disease (CMS/HCC)    Orthostatic hypotension    Mixed hyperlipidemia    End-stage renal disease on hemodialysis (CMS/HCC)    DM (diabetes mellitus) (CMS/[facility])    Absent pedal pulses     Acute hypoxic respiratory failure pneumonia.  Bilateral heel wounds and metabolic encephalopathy.  End-stage renal disease on dialysis.  Chronic hypotension.      Plan  Patient admitted to ICU last night  A-line and central line for monitoring.    Blood pressures  systolic currently with a mean arterial pressure around 60 on Levophed.  Systolic goal of 80 per nephrology.  Can wean Levophed today.     Hypotension question sepsis  Currently on Levophed low-dose.  Albumin ordered by nephrology.  Continue antibiotics, infectious disease following  Per ID notes  Oral vancomycin-treatment for C. difficile-total of 14 days  Oral doxycycline as suppressive therapy for MRSA bacteremia with possible infective endocarditis  Continue Zosyn-empiric therapy    Hypoxic respiratory failure - On room air  Status post thoracentesis 600 mL  Fluid transudative.  Repeat chest x-ray  Continue Duo Nebs  Incentive spirometry/pulmonary hygiene    Covid-19  Off  steroids and remdesivir    Prophylaxis    Overall appears stable from a mentation perspective.  Repeat chest x-ray this morning attempt wean oxygen.  Blood pressure monitoring continues to be a challenge.  Suspect that we can wean Levophed.  Goal of systolic 80 as per above from renal.        Matthew Engle MD  [facility]       This critically ill patient continues to be at-risk for deterioration / failure due to the above mentioned dysfunctional unstable organ systems.   Critical care time is spent at bedside includes review of diagnostic tests, labs, and radiographs, serial assessments and management of hemodynamics, respiratory status, and coordination of care with different members of interdisciplinary team  Assessment, impression and plans are reflected in the note above as well as the orders.     Critical concerns addressed:     Time Spent in critical Care, exclusive of procedures : mins.     Disclaimer: Parts of this chart were dictated with voice recognition software. Please excuse any errors of grammar, spelling, or transcription which are not corrected. Please contact me with any questions regarding documentation.

## 2023-12-17 NOTE — PROGRESS NOTES
CONSULT PROGRESS NOTES    SERVICE DATE: 12/17/2023   SERVICE TIME: 9:20 AM    CONSULTING SERVICE: Nephrology    ASSESSMENT AND PLAN   73-year-old with numerous medical problems including end-stage renal disease admitted for hypotension.  1.  End-stage renal disease  2.  Hypotension  3.  Fluid overload  4.  Anemia of chronic renal disease     I am obliging a thrice weekly hemodialysis schedule.  She now has an arterial line giving at least more seemingly accurate blood pressure readings and is on norepinephrine.  She received IV albumin with transient improvement.  I would wean the pressors for a systolic blood pressure above 80, which is typically where she runs.  There is no role for hemodialysis today.  Anticipate hemodialysis will be needed tomorrow.  I am not inclined to use CRRT.  She seems to be mentating better today, but concerned about the recent thoracentesis, recurrence of effusion versus worsening infiltrate, and multiple antibiotics for various types of infections.  Appreciate help from infectious disease.    Continue midodrine, continue low temperature dialysate, attempt 1.5 L volume removal tomorrow.  No growth as yet on blood cultures.  Case discussed with Dr. Figueroa.    SUBJECTIVE  INTERVAL HPI: She was transferred to the intensive care unit due to sustained hypotension with erratic blood pressures, inability to get an accurate blood pressure measurement, and alteration in mental status.  She has no major dyspnea today, no cough.  She says her last bowel movement was yesterday.  She still being treated for COVID, C. difficile.  Blood cultures were drawn yesterday.  Hemodialysis was 2 days ago, uneventful.  She received IV albumin overnight.    MEDICATIONS:  albumin human, 25 g, intravenous, q8h  apixaban, 2.5 mg, oral, BID  calcium acetate, 667 mg, oral, TID with meals  collagenase, , Topical, Daily  doxycycline, 100 mg, oral, Daily  epoetin di or biosimilar, 10,000 Units, intravenous, Every  Mon/Wed/Fri  escitalopram, 10 mg, oral, Daily  febuxostat, 40 mg, oral, Every other day  fenofibrate, 160 mg, oral, Daily  gabapentin, 100 mg, oral, BID  heparin, 2,000 Units, intra-catheter, After Dialysis  heparin, 2,000 Units, intra-catheter, After Dialysis  heparin, 2,000 Units, intra-catheter, After Dialysis  heparin, 2,000 Units, intra-catheter, After Dialysis  insulin lispro, 0-5 Units, subcutaneous, TID with meals  ipratropium-albuteroL, 3 mL, nebulization, TID  metoprolol succinate XL, 12.5 mg, oral, Daily  midodrine, 15 mg, oral, TID with meals  nystatin, 1 Application, Topical, BID  piperacillin-tazobactam, 2.25 g, intravenous, q6h  simvastatin, 40 mg, oral, Nightly  vancomycin, 125 mg, oral, 4x daily       norepinephrine, 0.01-3 mcg/kg/min, Last Rate: 0.07 mcg/kg/min (12/17/23 0513)       PRN medications: acetaminophen, acetaminophen, albumin human, dextrose 10 % in water (D10W), dextrose, glucagon, oxygen, polyethylene glycol, zinc oxide     OBJECTIVE  PHYSICAL EXAM:   Heart Rate:  []   Temp:  [36 °C (96.8 °F)-36.7 °C (98.1 °F)]   Resp:  [7-25]   BP: (73-79)/(34-40)   Weight:  [65.2 kg (143 lb 11.8 oz)]   SpO2:  [86 %-100 %]   Body mass index is 26.28 kg/m².  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       DATA:   Labs:  Results for orders placed or performed during the hospital encounter of 12/06/23 (from the past 96 hour(s))   POCT GLUCOSE   Result Value Ref Range     POCT Glucose 112 (H) 74 - 99 mg/dL   POCT GLUCOSE   Result Value Ref Range    POCT Glucose 157 (H) 74 - 99 mg/dL   POCT GLUCOSE   Result Value Ref Range    POCT Glucose 113 (H) 74 - 99 mg/dL   Basic metabolic panel   Result Value Ref Range    Glucose 149 (H) 65 - 99 mg/dL    Sodium 134 133 - 145 mmol/L    Potassium 3.2 (L) 3.4 - 5.1 mmol/L    Chloride 97 97 - 107 mmol/L    Bicarbonate 26 24 - 31 mmol/L    Urea Nitrogen 11 8 - 25 mg/dL    Creatinine 2.60 (H) 0.40 - 1.60 mg/dL    eGFR 19 (L) >60 mL/min/1.73m*2    Calcium 8.6 8.5 - 10.4 mg/dL    Anion Gap 11 <=19 mmol/L   CBC   Result Value Ref Range    WBC 17.9 (H) 4.4 - 11.3 x10*3/uL    nRBC 0.0 0.0 - 0.0 /100 WBCs    RBC 4.04 4.00 - 5.20 x10*6/uL    Hemoglobin 11.5 (L) 12.0 - 16.0 g/dL    Hematocrit 34.8 (L) 36.0 - 46.0 %    MCV 86 80 - 100 fL    MCH 28.5 26.0 - 34.0 pg    MCHC 33.0 32.0 - 36.0 g/dL    RDW 21.8 (H) 11.5 - 14.5 %    Platelets 79 (L) 150 - 450 x10*3/uL   POCT GLUCOSE   Result Value Ref Range    POCT Glucose 151 (H) 74 - 99 mg/dL   POCT GLUCOSE   Result Value Ref Range    POCT Glucose 147 (H) 74 - 99 mg/dL   Blood Gas Arterial Full Panel   Result Value Ref Range    POCT pH, Arterial 7.48 (H) 7.38 - 7.42 pH    POCT pCO2, Arterial 42 38 - 42 mm Hg    POCT pO2, Arterial 103 (H) 85 - 95 mm Hg    POCT SO2, Arterial 100 94 - 100 %    POCT Oxy Hemoglobin, Arterial 97.3 94.0 - 98.0 %    POCT Hematocrit Calculated, Arterial 37.0 36.0 - 46.0 %    POCT Sodium, Arterial 131 (L) 136 - 145 mmol/L    POCT Potassium, Arterial 3.2 (L) 3.5 - 5.3 mmol/L    POCT Chloride, Arterial 98 98 - 107 mmol/L    POCT Ionized Calcium, Arterial 1.16 1.10 - 1.33 mmol/L    POCT Glucose, Arterial 151 (H) 74 - 99 mg/dL    POCT Lactate, Arterial 2.6 (H) 0.4 - 2.0 mmol/L    POCT Base Excess, Arterial 7.1 (H) -2.0 - 3.0 mmol/L    POCT HCO3 Calculated, Arterial 31.3 (H) 22.0 - 26.0 mmol/L    POCT Hemoglobin, Arterial 12.2 12.0 - 16.0 g/dL    POCT Anion Gap, Arterial 5 (L) 10 - 25 mmo/L     Patient Temperature 37.0 degrees Celsius    FiO2 36 %    Apparatus CANNULA     Site of Arterial Puncture Radial Right     Paul's Test Positive    Blood Gas Lactic Acid, Venous   Result Value Ref Range    POCT Lactate, Venous 3.6 (H) 0.4 - 2.0 mmol/L   Staphylococcus aureus/MRSA colonization, Culture    Specimen: Anterior Nares; Swab   Result Value Ref Range    Staph/MRSA Screen Culture No Staphylococcus aureus isolated    POCT GLUCOSE   Result Value Ref Range    POCT Glucose 146 (H) 74 - 99 mg/dL   RSV PCR   Result Value Ref Range    RSV PCR Not Detected Not Detected   Sars-CoV-2 and Influenza A/B PCR   Result Value Ref Range    Flu A Result Not Detected Not Detected    Flu B Result Not Detected Not Detected    Coronavirus 2019, PCR Detected (A) Not Detected   POCT GLUCOSE   Result Value Ref Range    POCT Glucose 109 (H) 74 - 99 mg/dL   POCT GLUCOSE   Result Value Ref Range    POCT Glucose 113 (H) 74 - 99 mg/dL   Lactate Dehydrogenase, Fluid   Result Value Ref Range    LD, Fluid 47 Not established. U/L   Glucose, Fluid   Result Value Ref Range    Glucose, Fluid 128 Not established mg/dL   Protein, Total Fluid   Result Value Ref Range    Protein, Total Fluid 1.5 Not established g/dL   POCT GLUCOSE   Result Value Ref Range    POCT Glucose 142 (H) 74 - 99 mg/dL   POCT GLUCOSE   Result Value Ref Range    POCT Glucose 122 (H) 74 - 99 mg/dL   Basic metabolic panel   Result Value Ref Range    Glucose 117 (H) 65 - 99 mg/dL    Sodium 134 133 - 145 mmol/L    Potassium 3.2 (L) 3.4 - 5.1 mmol/L    Chloride 100 97 - 107 mmol/L    Bicarbonate 24 24 - 31 mmol/L    Urea Nitrogen 14 8 - 25 mg/dL    Creatinine 2.10 (H) 0.40 - 1.60 mg/dL    eGFR 25 (L) >60 mL/min/1.73m*2    Calcium 8.2 (L) 8.5 - 10.4 mg/dL    Anion Gap 10 <=19 mmol/L   CBC   Result Value Ref Range    WBC 9.0 4.4 - 11.3 x10*3/uL    nRBC 0.0 0.0 - 0.0 /100 WBCs    RBC 3.79 (L) 4.00 - 5.20 x10*6/uL    Hemoglobin 10.8 (L) 12.0 - 16.0 g/dL    Hematocrit 35.1 (L) 36.0 -  46.0 %    MCV 93 80 - 100 fL    MCH 28.5 26.0 - 34.0 pg    MCHC 30.8 (L) 32.0 - 36.0 g/dL    RDW 21.8 (H) 11.5 - 14.5 %    Platelets 97 (L) 150 - 450 x10*3/uL   Albumin   Result Value Ref Range    Albumin 2.2 (L) 3.5 - 5.0 g/dL   Magnesium   Result Value Ref Range    Magnesium 1.90 1.60 - 3.10 mg/dL   POCT GLUCOSE   Result Value Ref Range    POCT Glucose 93 74 - 99 mg/dL   POCT GLUCOSE   Result Value Ref Range    POCT Glucose 105 (H) 74 - 99 mg/dL   POCT GLUCOSE   Result Value Ref Range    POCT Glucose 136 (H) 74 - 99 mg/dL   POCT GLUCOSE   Result Value Ref Range    POCT Glucose 145 (H) 74 - 99 mg/dL   Blood Culture    Specimen: Peripheral Venipuncture; Blood culture   Result Value Ref Range    Blood Culture Loaded on Instrument - Culture in progress    Blood Culture    Specimen: Arterial Line; Blood culture   Result Value Ref Range    Blood Culture Loaded on Instrument - Culture in progress    Blood Gas Lactic Acid, Venous   Result Value Ref Range    POCT Lactate, Venous 3.3 (H) 0.4 - 2.0 mmol/L   POCT GLUCOSE   Result Value Ref Range    POCT Glucose 127 (H) 74 - 99 mg/dL   Blood Gas Lactic Acid, Venous   Result Value Ref Range    POCT Lactate, Venous 2.1 (H) 0.4 - 2.0 mmol/L   Renal function panel   Result Value Ref Range    Glucose 193 (H) 65 - 99 mg/dL    Sodium 136 133 - 145 mmol/L    Potassium 3.0 (L) 3.4 - 5.1 mmol/L    Chloride 98 97 - 107 mmol/L    Bicarbonate 25 24 - 31 mmol/L    Urea Nitrogen 18 8 - 25 mg/dL    Creatinine 2.70 (H) 0.40 - 1.60 mg/dL    eGFR 18 (L) >60 mL/min/1.73m*2    Calcium 8.5 8.5 - 10.4 mg/dL    Phosphorus 0.9 (L) 2.5 - 4.5 mg/dL    Albumin 3.2 (L) 3.5 - 5.0 g/dL    Anion Gap 13 <=19 mmol/L   POCT GLUCOSE   Result Value Ref Range    POCT Glucose 142 (H) 74 - 99 mg/dL         SIGNATURE: Saeed Mondragon MD PATIENT NAME: Ayanna Gross   DATE: December 17, 2023 MRN: [id number]   TIME: 9:20 AM PAGER: [phone]

## 2023-12-17 NOTE — CARE PLAN
The patient's goals for the shift include      The clinical goals for the shift include pt remains hemodynamically stable  no injury improve skin integrity    Over the shift, the patient did not make progress toward the following goals. Barriers to progression include . Recommendations to address these barriers include .

## 2023-12-17 NOTE — CARE PLAN
The patient's goals for the shift include      The clinical goals for the shift include hemodynamically stable,  no injury.  improve skin integrity.

## 2023-12-17 NOTE — PROGRESS NOTES
Ayanna Gross is a 73 y.o. female on day 11 of admission presenting with Hypotension, unspecified hypotension type.      Subjective   Patient reports that she feels okay today. Doesn't know where she is. Says her buttocks is sore. No other complaints.       Objective     Last Recorded Vitals  BP (!) 78/36   Pulse 86   Temp 35.7 °C (96.3 °F) (Temporal)   Resp 20   Wt 65.2 kg (143 lb 11.8 oz)   SpO2 99%   Intake/Output last 3 Shifts:    Intake/Output Summary (Last 24 hours) at 12/17/2023 1119  Last data filed at 12/17/2023 0513  Gross per 24 hour   Intake 616.57 ml   Output --   Net 616.57 ml       Admission Weight  Weight: 72.6 kg (160 lb) (12/06/23 1301)    Daily Weight  12/17/23 : 65.2 kg (143 lb 11.8 oz)    Image Results  XR chest 1 view  Narrative: Interpreted By:  Judd Lee,   STUDY:  XR CHEST 1 VIEW; 12/14/2023 10:43 am      INDICATION:  Hypoxic respiratory failure.      COMPARISON:  December 8, 2023      ACCESSION NUMBER(S):  [id number]      ORDERING CLINICIAN:  RODRI SURESH      FINDINGS:  RESULT: Right-sided double-lumen dialysis catheter is noted with  distal tip at the level of the right atrium. Vascular stents are  noted in the left subclavian region. Surgical clips are noted in the  left axilla. Cardiac silhouette size is indeterminate as the left  heart border is obscured. There are calcifications involving the  thoracic aorta. Left basilar obscurity obscures the left  hemidiaphragm and left heart border with hazy opacity extending to  the left mid lung. There are degenerative changes of the bilateral  shoulders.      Impression: Increasing density in the left mid and lower lung suggests increasing  infiltrate or pleural effusion.          Signed by: Judd Lee 12/14/2023 10:58 AM  Dictation workstation:   [id number]      Physical Exam  General: alert, no diaphoresis   Lungs: CTA BL   Heart: RRR, no LE edema BL   GI: abdomen soft, nontender, nondistended, BS present   MSK: no  joint effusion or deformity   Skin: no rashes, erythema, or ecchymosis   Neuro: grossly normal cognition, motor strength, sensation      Relevant Results               Scheduled medications  albumin human, 25 g, intravenous, q8h  apixaban, 2.5 mg, oral, BID  calcium acetate, 667 mg, oral, TID with meals  collagenase, , Topical, Daily  doxycycline, 100 mg, oral, Daily  epoetin di or biosimilar, 10,000 Units, intravenous, Every Mon/Wed/Fri  escitalopram, 10 mg, oral, Daily  febuxostat, 40 mg, oral, Every other day  fenofibrate, 160 mg, oral, Daily  fidaxomicin, 200 mg, oral, BID  gabapentin, 100 mg, oral, BID  heparin, 2,000 Units, intra-catheter, After Dialysis  heparin, 2,000 Units, intra-catheter, After Dialysis  heparin, 2,000 Units, intra-catheter, After Dialysis  heparin, 2,000 Units, intra-catheter, After Dialysis  insulin lispro, 0-5 Units, subcutaneous, TID with meals  ipratropium-albuteroL, 3 mL, nebulization, TID  metoprolol succinate XL, 12.5 mg, oral, Daily  midodrine, 15 mg, oral, TID with meals  nystatin, 1 Application, Topical, BID  piperacillin-tazobactam, 2.25 g, intravenous, q6h  potassium chloride, 20 mEq, intravenous, Once  simvastatin, 40 mg, oral, Nightly      Continuous medications  norepinephrine, 0.01-3 mcg/kg/min, Last Rate: 0.07 mcg/kg/min (12/17/23 0513)      PRN medications  PRN medications: acetaminophen, acetaminophen, albumin human, dextrose 10 % in water (D10W), dextrose, glucagon, oxygen, polyethylene glycol, zinc oxide    Results for orders placed or performed during the hospital encounter of 12/06/23 (from the past 24 hour(s))   POCT GLUCOSE   Result Value Ref Range    POCT Glucose 136 (H) 74 - 99 mg/dL   POCT GLUCOSE   Result Value Ref Range    POCT Glucose 145 (H) 74 - 99 mg/dL   Blood Culture    Specimen: Peripheral Venipuncture; Blood culture   Result Value Ref Range    Blood Culture Loaded on Instrument - Culture in progress    Blood Culture    Specimen: Arterial Line; Blood  culture   Result Value Ref Range    Blood Culture Loaded on Instrument - Culture in progress    Blood Gas Lactic Acid, Venous   Result Value Ref Range    POCT Lactate, Venous 3.3 (H) 0.4 - 2.0 mmol/L   POCT GLUCOSE   Result Value Ref Range    POCT Glucose 127 (H) 74 - 99 mg/dL   Blood Gas Lactic Acid, Venous   Result Value Ref Range    POCT Lactate, Venous 2.1 (H) 0.4 - 2.0 mmol/L   Renal function panel   Result Value Ref Range    Glucose 193 (H) 65 - 99 mg/dL    Sodium 136 133 - 145 mmol/L    Potassium 3.0 (L) 3.4 - 5.1 mmol/L    Chloride 98 97 - 107 mmol/L    Bicarbonate 25 24 - 31 mmol/L    Urea Nitrogen 18 8 - 25 mg/dL    Creatinine 2.70 (H) 0.40 - 1.60 mg/dL    eGFR 18 (L) >60 mL/min/1.73m*2    Calcium 8.5 8.5 - 10.4 mg/dL    Phosphorus 0.9 (L) 2.5 - 4.5 mg/dL    Albumin 3.2 (L) 3.5 - 5.0 g/dL    Anion Gap 13 <=19 mmol/L   POCT GLUCOSE   Result Value Ref Range    POCT Glucose 142 (H) 74 - 99 mg/dL   POCT GLUCOSE   Result Value Ref Range    POCT Glucose 158 (H) 74 - 99 mg/dL        Assessment/Plan        This patient has a central line   Reason for the central line remaining today? Parenteral medication            Principal Problem:    Hypotension, unspecified hypotension type  Active Problems:    Paroxysmal atrial fibrillation (CMS/[facility])    Peripheral vascular disease (CMS/[facility])    Orthostatic hypotension    Mixed hyperlipidemia    End-stage renal disease on hemodialysis (CMS/[facility])    DM (diabetes mellitus) (CMS/[facility])    Absent pedal pulses    Shock  - unclear etiology-- suspect septic overall but could be element of hypovolemia/hypoalbuminemia given diarrhea from c diff and poor nutritional stauts  - on levophed, titrating for goal of SBP >80. She has chronic hypotension on midodrine  - discussed with Dr. Mondragon  - on broad spectrum abx  - discussed with Dr. Yfn COHEN fibrillation  Continue low-dose beta blocker   Eliquis for stroke prophylaxis      Thrombocytopenia  Recheck in am  No s/s of bleeding      Mixed hyperlipidemia  Continue statin, fenofibrate      DM (diabetes mellitus)   Hold Tradjenta   AC/HS glucose with SSI coverage   Hypoglycemia protocol      Groin excoriation   Wound care consult   Nystatin powder   Keep skin clean and dry   Sacrum covered with Mepilex.      C-diff   PCR positive and negative toxin.   Vancomycin 125 mg 4 times daily   ID consult - still with frequent diarrhea but difficult to trend if improving. Discussed with Dr. Coulter-- given shock, may be candidate for fidaxomicin        Malnutrition Diagnosis Status: Ongoing  Malnutrition Diagnosis: Moderate malnutrition related to acute disease or injury  As Evidenced by: 1-2% unintentional wt loss in 1 week (~4% in 2 weeks), Poor intake <75% of estimated energy requirement > 7days, and mild fat and muscle deficits  I agree with the dietitian's malnutrition diagnosis.       Needs continued ICU care for vasopressors and arterial line monitoring.            Klaudia Figueroa DO

## 2023-12-17 NOTE — NURSING NOTE
Herb heel drsg done  as ordered  off loading boots intact   herb arms wrapped in kerlex.  Weepin serous drainage  mp-sr with pvc's  pt pulled up in bed and repositioned oral care done   rectal tube draining smll amount of thick brown stool

## 2023-12-18 NOTE — PROGRESS NOTES
FOLLOWUP FOR: Critical care management    SUBJECTIVE  Remains on 4 L nasal cannula.  Denies any shortness of breath, cough.  Denies pain.  Remains on Levophed at 0.05, receiving oral midodrine for    PHYSICAL EXAM        12/18/2023     5:45 AM 12/18/2023     6:00 AM 12/18/2023     6:15 AM 12/18/2023     6:30 AM 12/18/2023     6:45 AM 12/18/2023     7:00 AM 12/18/2023     7:15 AM   Vitals   Heart Rate 87 72 86 86 85 83 85   Resp 16 16 17 18 16 20 12       Vital signs reviewed   NAD, awake and alert, O2   Lungs Symmetric excursions.  Auscultation - diminished but clear, no wheezing/rales/rhonchi.     Cor RSR No murmur, rub, gallop   Abd soft and nontender, no rebound or distention, no HS megaly   Ext no CCE   Neuro no focal deficits.  moves all extremities symmetrically.  speech normal.  affect normal    LABS    Serum electrolytes not yet available.  CBC shows white count 12.7, H&H 10.7 and 32.  Platelet count 116,000.  Glucose 130    ASSESSMENT:    .  Septic shock  .  COVID-19 infection  .  C. difficile colitis  .  End-stage renal disease  .  Possible endocarditis  .  Transudative pleural effusion    PLAN    .  Continue pressors, hopefully will be able to wean off soon  .  Dialysis  .  Oral vancomycin  .  Systemic antibiotics, infectious disease following  .  Bronchodilators and pulmonary hygiene    Moris Scott MD, [facility]

## 2023-12-18 NOTE — PROGRESS NOTES
Ayanna Gross is a 73 y.o. female on day 12 of admission presenting with Hypotension, unspecified hypotension type.    Subjective   Interval History:   Patient seen and examined  Interval transferred to ICU because of hypotension  On pressors  Oral vancomycin changed to Dificid  Having diarrhea-fecal management system in place  Afebrile, no chills  No chest pain        Review of Systems   All other systems reviewed and are negative.      Objective   Range of Vitals (last 24 hours)  Heart Rate:  [65-90]   Temp:  [35 °C (95 °F)-36.4 °C (97.5 °F)]   Resp:  [12-30]   BP: (78)/(49)   Weight:  [73.5 kg (162 lb 0.6 oz)]   SpO2:  [78 %-100 %]   Daily Weight  12/18/23 : 73.5 kg (162 lb 0.6 oz)    Body mass index is 29.63 kg/m².    Physical Exam  Constitutional:       Appearance: Normal appearance.   HENT:      Head: Normocephalic and atraumatic.   Eyes:      Extraocular Movements: Extraocular movements intact.      Conjunctiva/sclera: Conjunctivae normal.   Cardiovascular:      Rate and Rhythm: Regular rhythm.      Heart sounds: Normal heart sounds.   Pulmonary:      Breath sounds: Decreased breath sounds present.   Abdominal:      General: Abdomen is flat. Bowel sounds are normal.      Palpations: Abdomen is soft.   Musculoskeletal:      Cervical back: Normal range of motion and neck supple.   Skin:     General: Skin is warm and dry.   Neurological:      Mental Status: She is alert and oriented to person, place, and time.   Psychiatric:         Mood and Affect: Mood and affect normal.           Antibiotics  sodium chloride 0.9 % bolus 500 mL  cefepime (Maxipime) 1 g in dextrose 5 % 50 mL IV  vancomycin-diluent combo no.1 (Xellia) IVPB 1 g  apixaban (Eliquis) tablet 2.5 mg  calcium acetate (Phoslo) capsule 2,000 mg  escitalopram (Lexapro) tablet 10 mg  febuxostat (Uloric) tablet 40 mg  fenofibrate (Triglide) tablet 160 mg  gabapentin (Neurontin) capsule 300 mg  metoprolol succinate XL (Toprol-XL) 24 hr tablet 12.5  mg  midodrine (Proamatine) tablet 10 mg  nystatin (Mycostatin) 100,000 unit/gram powder 1 Application  B complex-vitamin C tablet 1 tablet  simvastatin (Zocor) tablet 40 mg  acetaminophen (Tylenol) tablet 650 mg  acetaminophen (Tylenol) tablet 650 mg  polyethylene glycol (Glycolax, Miralax) packet 17 g  dextrose 50 % injection 25 g  glucagon (Glucagen) injection 1 mg  dextrose 10 % in water (D10W) infusion  insulin lispro (HumaLOG) injection 0-5 Units  zinc oxide 20 % ointment 1 Application  calcium acetate (Phoslo) capsule 667 mg  vancomycin (Vancocin) capsule 125 mg  lidocaine PF (Xylocaine) 10 mg/mL (1 %) injection  heparin 1,000 unit/mL injection 2,000 Units  heparin 1,000 unit/mL injection 2,000 Units  midodrine (Proamatine) tablet 15 mg  albumin human 25 % solution 25 g  polyethylene glycol (Glycolax, Miralax) packet 17 g  collagenase 250 unit/gram ointment  vancomycin (Vancocin) 1,750 mg in dextrose 5 % in water (D5W) 250 mL IV  vancomycin-diluent combo no.1 (Xellia) IVPB 1,750 mg  albumin human 25 % solution 12.5 g  heparin 1,000 unit/mL injection 2,000 Units  heparin 1,000 unit/mL injection 2,000 Units  vancomycin (Vancocin) placeholder  doxycycline (Vibramycin) capsule 100 mg  potassium chloride CR (Klor-Con) ER tablet 10 mEq  gabapentin (Neurontin) capsule 100 mg  heparin 1,000 unit/mL injection 2,000 Units  heparin 1,000 unit/mL injection 2,000 Units  piperacillin-tazobactam-dextrose (Zosyn) IV 2.25 g  doxycycline (Vibramycin) capsule 100 mg  ipratropium-albuteroL (Duo-Neb) 0.5-2.5 mg/3 mL nebulizer solution 3 mL  heparin 1,000 unit/mL injection 2,000 Units  heparin 1,000 unit/mL injection 2,000 Units  ipratropium-albuteroL (Duo-Neb) 0.5-2.5 mg/3 mL nebulizer solution 3 mL  dexAMETHasone (Decadron) injection 6 mg  albumin human 25 % solution 25 g  epoetin di-epbx (Retacrit) injection 10,000 Units  lidocaine PF (Xylocaine) 20 mg/mL (2 %) injection  potassium chloride CR (Klor-Con) ER tablet 10  mEq  albumin human 5 % infusion 25 g  potassium chloride CR (Klor-Con M20) ER tablet 20 mEq  norepinephrine (Levophed) 8 mg in dextrose 5% 250 mL (0.032 mg/mL) infusion (premix)  oxygen (O2) therapy  piperacillin-tazobactam-dextrose (Zosyn) IV 2.25 g  heparin 1,000 unit/mL injection 2,000 Units  heparin 1,000 unit/mL injection 2,000 Units  potassium chloride 20 mEq in 100 mL IV premix  fidaxomicin (Dificid) tablet 200 mg      Relevant Results  Labs  Results from last 72 hours   Lab Units 12/18/23  0508 12/17/23  1425 12/16/23  0516   WBC AUTO x10*3/uL 12.7* 12.0* 9.0   HEMOGLOBIN g/dL 10.7* 9.9* 10.8*   HEMATOCRIT % 31.9* 30.7* 35.1*   PLATELETS AUTO x10*3/uL 116* 113* 97*     Results from last 72 hours   Lab Units 12/18/23  0747 12/17/23  0407 12/16/23  0516   SODIUM mmol/L 135 136 134   POTASSIUM mmol/L 3.3* 3.0* 3.2*   CHLORIDE mmol/L 99 98 100   CO2 mmol/L 23* 25 24   BUN mg/dL 25 18 14   CREATININE mg/dL 3.60* 2.70* 2.10*   GLUCOSE mg/dL 140* 193* 117*   CALCIUM mg/dL 9.0 8.5 8.2*   ANION GAP mmol/L 13 13 10   EGFR mL/min/1.73m*2 13* 18* 25*   PHOSPHORUS mg/dL 0.9* 0.9*  --      Results from last 72 hours   Lab Units 12/18/23  0747 12/17/23  0407 12/16/23  0516   ALBUMIN g/dL 3.1* 3.2* 2.2*     Estimated Creatinine Clearance: 13.5 mL/min (A) (by C-G formula based on SCr of 3.6 mg/dL (H)).  CRP   Date Value Ref Range Status   06/23/2023 19.9 (H) 0 - 2.0 MG/DL Final     Comment:     Performed at [address] [address]   05/22/2022 1.0 0 - 2.0 MG/DL Final     Comment:     Performed at [address] [address]   12/13/2020 10.1 (H) 0 - 2.0 MG/DL Final     Comment:     Performed at [address] [address]     Microbiology  Reviewed  Imaging  XR chest 1 view    Result Date: 12/14/2023  Interpreted By:  Judd Lee, STUDY: XR CHEST 1 VIEW; 12/14/2023 10:43 am   INDICATION: Hypoxic respiratory failure.   COMPARISON: December 8, 2023   ACCESSION  NUMBER(S): [id number]   ORDERING CLINICIAN: RODRI SURESH   FINDINGS: RESULT: Right-sided double-lumen dialysis catheter is noted with distal tip at the level of the right atrium. Vascular stents are noted in the left subclavian region. Surgical clips are noted in the left axilla. Cardiac silhouette size is indeterminate as the left heart border is obscured. There are calcifications involving the thoracic aorta. Left basilar obscurity obscures the left hemidiaphragm and left heart border with hazy opacity extending to the left mid lung. There are degenerative changes of the bilateral shoulders.       Increasing density in the left mid and lower lung suggests increasing infiltrate or pleural effusion.     Signed by: Judd Lee 12/14/2023 10:58 AM Dictation workstation:   [id number]    Vascular US ankle brachial index (OCTAVIO) without exercise    Result Date: 12/13/2023           [place] [address]            Phone [phone]  Vascular Lab Report  VASC US ANKLE BRACHIAL INDEX (OCTAVIO) WITHOUT EXERCISE Patient Name:      BASIA Gray Physician: [id number] Lizet Messer MD Study Date:        12/11/2023          Ordering Provider: [id number] MARIANN SUTTON MRN/PID:           [id number]            Fellow: Accession#:        [id number]        Technologist:      Luz Maria Trivedi RVT Date of Birth/Age: 1950 / 73 years Technologist 2:    Dara Parr RVT Gender:            F                   Encounter#:        [id number] Admission Status:  Inpatient           Location           [facility]                                        Performed:  Diagnosis/ICD: Other specified symptoms and signs involving the circulatory and                respiratory systems-R09.89 Indication:    Peripheral vascular disease CPT Codes:     11705.52 Peripheral artery OCTAVIO Only Reduced Service  Pertinent History: Absent  pedal pulses. Patient is on dialysis.  CONCLUSIONS: Right Lower PVR: Evidence of moderate arterial occlusive disease in the right lower extremity at rest. Right pressures of >220 mmHg suggest no compressibility of vessels and may make absolute Segmental Limb Pressures (SLP) unreliable. Decreased digital perfusion noted. Biphasic flow is noted in the right dorsalis pedis artery. Left Lower PVR: Evidence of moderate arterial occlusive disease in the left lower extremity at rest. Left pressures of >220 mmHg suggest no compressibility of vessels and may make absolute Segmental Limb Pressures (SLP) unreliable. Decreased digital perfusion noted. Biphasic flow is noted in the left dorsalis pedis artery. Additional Findings: Technically difficult and limited exam due to patient's positioning, movement and inability to cooperate with exam.  Imaging & Doppler Findings:  RIGHT Lower PVR              Pressures Right Dorsalis Pedis (Ankle) 255 mmHg   LEFT Lower PVR              Pressures Left Dorsalis Pedis (Ankle) 255 mmHg   [id number] Lizet Messer MD Electronically signed by [id number] Lizet Messer MD on 12/13/2023 at 8:38:56 AM  ** Final **     CT head wo IV contrast    Result Date: 12/11/2023  Interpreted By:  Brendon Hook, STUDY: CT HEAD WO IV CONTRAST; 12/11/2023 5:20 pm   INDICATION: Signs/Symptoms:acute confusion.   COMPARISON: 20 August 2023   ACCESSION NUMBER(S): [id number]   ORDERING CLINICIAN: RODRI SURESH   TECHNIQUE: CT was performed with one or more of the following dose reduction techniques: automated exposure control, adjustment of the mA and/or kV according to patient size, or use of iterative reconstruction technique.       PROCEDURE: 3.0 mm axial images were obtained through the brain, to include the posterior fossa without intravenous contrast enhancement.   FINDINGS: The patient's head is turned to the left and slightly canted in the gantry. Mild motion artifact is seen. There is symmetric volume loss of the  cerebral hemispheres. Moderate  decreased attenuation in the bilateral periventricular white matter, consistent with microangiopathy is noted.  There is no encephalomalacic change. Brainstem is unremarkable. Cerebellar hemispheres are symmetric. No subarachnoid, intraparenchymal, subdural or interventricular hemorrhage. No intra- or extra-axial mass or abnormal blood products are demonstrated. Hyperostosis frontalis interna is seen. Slight hypoplasia of the left mastoid.   The paranasal sinuses and mastoids as well as calvarium are unremarkable.       1. No acute intracranial findings. 2. Symmetric volume loss of the cerebral hemispheres. 3. Periventricular Microangiopathy. 4. No posttraumatic abnormality.   This report has been produced using speech recognition. This exam is available in DICOM format to non-affiliated healthcare facilities on a secure media free searchable basis with prior patient authorization. The patient exposure is reported to a radiation dose index registry. All CT examinations are performed with one or more of the following dose reduction techniques: Automated Exposure Control, Adjustment of mA and/or KV according to patient size, or use of iterative reconstruction techniques.   MACRO: None   Signed by: Brendon Hook 12/11/2023 7:05 PM Dictation workstation:   [id number]    US thoracentesis    Result Date: 12/8/2023  Interpreted By:  Sandro Hernandez, STUDY: US THORACENTESIS; 12/7/2023 3:16 pm   INDICATION: Signs/Symptoms:Left pleural effusion question of whether this needs thoracentesis, history of ESRD, just had dialysis today.   COMPARISON: None   ACCESSION NUMBER(S): [id number]   ORDERING CLINICIAN: JEZ LONG   TECHNIQUE: Informed consent obtained. Patient positionedsitting upright. Skin prepped, draped and anesthetized. Under ultrasound guidance, a centesis catheter/needle was advanced into rightpleural cavity.   FINDINGS: A total of 750 cc of clear yellow fluid was aspirated. A  sample was sent for analysis. The patient tolerated the procedure well.       Ultrasound-guided left thoracentesis.     Signed by: Sandro Hernandez 12/8/2023 3:01 PM Dictation workstation:   [id number]    XR chest 1 view    Result Date: 12/8/2023  Interpreted By:  Maria Garcia, STUDY: XR CHEST 1 VIEW 12/8/2023 11:57 am   INDICATION: Signs/Symptoms:S/P thoracentesis   COMPARISON: 12/06/2023   ACCESSION NUMBER(S): [id number]   ORDERING CLINICIAN: JEZ LONG   TECHNIQUE: AP erect view of the chest at bedside   FINDINGS: There is an interval decrease in size of left pleural effusion when compared with the study done 2 days earlier. No pneumothorax is seen.   There is some residual left pleural effusion with infiltrates seen in the left mid and lower lung field. The right lung is clear.   There are surgical clips within the left axilla. Stent grafts are visible in the left subclavian region extending into the left axillary region. A tunneled right jugular dialysis catheter terminates within right atrium.       Decreased size of left effusion following ultrasound-guided thoracentesis with no pneumothorax.   There is some residual left pleural effusion noted with infiltrate in the left mid and lower lung field.   Signed by: Maria Garcia 12/8/2023 12:51 PM Dictation workstation:   [id number]    XR foot 3+ views bilateral    Result Date: 12/7/2023  Interpreted By:  Brendon Hook, STUDY: XR FOOT 3+ VIEWS BILATERAL; 12/7/2023 3:31 pm   INDICATION: Signs/Symptoms:Wound/Abscess/Acute neha process rule out/Comparative Exam.   COMPARISON: None   ACCESSION NUMBER(S): [id number]   ORDERING CLINICIAN: JOSSELYN STAHL   TECHNIQUE: Nonweightbearing AP, lateral and oblique views of the bilateral foot were performed.   FINDINGS: Right foot: Large heel spur is seen. Mild Achilles enthesopathy demonstrated. Moderate atherosclerotic disease is seen. There is suggestion of loss of the arch on this nonweightbearing view.  Degenerative change anterior plafond demonstrated and dorsal talus as well as posterior plafond and dorsal talus. Sclerosis anterior calcaneus is seen. Degenerative changes mild between the midfoot and forefoot. 1st metatarsophalangeal joint degenerative changes demonstrated. 2nd ray absent phalanges. Distal phalanges of the 3rd through 5th rays with overlap were difficult to visualize. Distal phalanx of the great toe is difficult to visualize.   Left foot: Large heel spur is seen. Advanced atherosclerotic disease demonstrated. Degenerative change in the calcaneus and talus seen with moderate anterior and mild posterior plafond degenerative change. Mild degenerative change in the midfoot and forefoot is seen. Distal phalanx of the great toe is absent. Degenerative change of the 2nd ray phalanges and 3rd ray phalanges seen with degenerative change of the 4th and 5th digits not well seen.       1. Right foot: Large heel spur. Atherosclerotic disease. Suggestion of diffuse osteopenia. No sclerosis to suggest osteomyelitis. No subcutaneous air to suggest gangrene. Absent flanges 2nd ray with difficult to visualized distal phalanges of the other digits.   2. Left foot: Large heel spur. Degenerative changes of the hindfoot midfoot and forefoot with atherosclerotic disease. Absent distal phalanx of the great toe. No periostitis or abnormal sclerosis with diffuse osteopenia to suggest osteomyelitis. No subcutaneous air to suggest gangrene.       Signed by: Brendon Hook 12/7/2023 8:39 PM Dictation workstation:   [id number]    ECG 12 Lead    Result Date: 12/7/2023   Poor data quality, interpretation may be adversely affected Normal sinus rhythm Right bundle branch block Septal infarct , age undetermined Possible Lateral infarct , age undetermined Abnormal ECG No previous ECGs available Confirmed by Hammad Callahan ([id number]) on 12/7/2023 1:07:11 PM    XR chest 1 view    Result Date: 12/6/2023  Interpreted By:  Roger Murdock,  STUDY: XR CHEST 1 VIEW;  12/6/2023 2:08 pm   INDICATION: Signs/Symptoms:Brief AMS and hypotension, now resolved.   COMPARISON: 08/21/2023..   ACCESSION NUMBER(S): [id number]   ORDERING CLINICIAN: PATEL TINSLEY   FINDINGS: New moderate left pleural effusion. There is silhouetting of the left heart border. Left subclavian stent present. Right-sided double-lumen central venous catheter terminates within the right ventricle. No acute osseous findings.       New, moderate left pleural effusion.     MACRO: None   Signed by: Roger Murdock 12/6/2023 2:18 PM Dictation workstation:   [id number]     Assessment/Plan   Shock-etiology unclear  Unstageable Bilateral heel ulcers  C-difficile infection-resolving  Type 2 diabetes mellitus with peripheral angiopathy with gangrene   acute respiratory failure-resolved  Coronavirus infection  Pleural effusion, s/p thoracentesis  History of chronic MRSA bacteremia, infective endocarditis-completed IV vancomycin, now on Doxycycline   Hypotension-resolved  ESRD on hemodialysis  Severe malnutrition-prealbumin of 3.2           Continue Dificid  Oral doxycycline as suppressive therapy for MRSA bacteremia with possible infective endocarditis  Continue Zosyn-empiric therapy  Monitor temperature WBC  Local care  Offloading  High-protein diet  Monitor stool  Podiatry follow up  Wean off pressors   droplet plus precautions  Contact plus precautions    Stephen Coulter MD

## 2023-12-18 NOTE — CARE PLAN
The patient's goals for the shift include  get some rest    The clinical goals for the shift include pt will remain hemodynamically stable and improve skin integrity      Problem: Diabetes  Goal: Achieve decreasing blood glucose levels by end of shift  Outcome: Progressing  Goal: Increase stability of blood glucose readings by end of shift  Outcome: Progressing  Goal: Decrease in ketones present in urine by end of shift  Outcome: Progressing  Goal: Maintain electrolyte levels within acceptable range throughout shift  Outcome: Progressing  Goal: Maintain glucose levels >70mg/dl to <250mg/dl throughout shift  Outcome: Progressing  Goal: No changes in neurological exam by end of shift  Outcome: Progressing  Goal: Learn about and adhere to nutrition recommendations by end of shift  Outcome: Progressing  Goal: Vital signs within normal range for age by end of shift  Outcome: Progressing  Goal: Increase self care and/or family involovement by end of shift  Outcome: Progressing  Goal: Receive DSME education by end of shift  Outcome: Progressing     Problem: Pain - Adult  Goal: Verbalizes/displays adequate comfort level or baseline comfort level  Outcome: Progressing     Problem: Safety - Adult  Goal: Free from fall injury  Outcome: Progressing     Problem: Discharge Planning  Goal: Discharge to home or other facility with appropriate resources  Outcome: Progressing     Problem: Chronic Conditions and Co-morbidities  Goal: Patient's chronic conditions and co-morbidity symptoms are monitored and maintained or improved  Outcome: Progressing     Problem: Skin  Goal: Decreased wound size/increased tissue granulation at next dressing change  Outcome: Progressing  Flowsheets (Taken 12/17/2023 2242)  Decreased wound size/increased tissue granulation at next dressing change:   Promote sleep for wound healing   Protective dressings over bony prominences  Goal: Participates in plan/prevention/treatment measures  Outcome:  Progressing  Flowsheets (Taken 12/17/2023 2242)  Participates in plan/prevention/treatment measures:   Elevate heels   Discuss with provider PT/OT consult  Goal: Prevent/manage excess moisture  Outcome: Progressing  Flowsheets (Taken 12/17/2023 2242)  Prevent/manage excess moisture:   Cleanse incontinence/protect with barrier cream   Follow provider orders for dressing changes   Moisturize dry skin   Monitor for/manage infection if present  Goal: Prevent/minimize sheer/friction injuries  Outcome: Progressing  Flowsheets (Taken 12/17/2023 2242)  Prevent/minimize sheer/friction injuries:   Complete micro-shifts as needed if patient unable. Adjust patient position to relieve pressure points, not a full turn   Turn/reposition every 2 hours/use positioning/transfer devices   Use pull sheet   Utilize specialty bed per algorithm  Goal: Promote/optimize nutrition  Outcome: Progressing  Flowsheets (Taken 12/17/2023 2242)  Promote/optimize nutrition:   Assist with feeding   Consume > 50% meals/supplements   Offer water/supplements/favorite foods   Monitor/record intake including meals  Goal: Promote skin healing  Outcome: Progressing  Flowsheets (Taken 12/17/2023 2242)  Promote skin healing:   Assess skin/pad under line(s)/device(s)   Ensure correct size (line/device) and apply per  instructions   Protective dressings over bony prominences   Turn/reposition every 2 hours/use positioning/transfer devices     Problem: Fall/Injury  Goal: Not fall by end of shift  Outcome: Progressing  Goal: Be free from injury by end of the shift  Outcome: Progressing  Goal: Verbalize understanding of personal risk factors for fall in the hospital  Outcome: Progressing  Goal: Verbalize understanding of risk factor reduction measures to prevent injury from fall in the home  Outcome: Progressing  Goal: Use assistive devices by end of the shift  Outcome: Progressing  Goal: Pace activities to prevent fatigue by end of the shift  Outcome:  Progressing     Problem: Nutrition  Goal: Less than 5 days NPO/clear liquids  Outcome: Progressing  Goal: Oral intake greater than 50%  Outcome: Progressing  Goal: Oral intake greater 75%  Outcome: Progressing  Goal: Consume prescribed supplement  Outcome: Progressing  Goal: Adequate PO fluid intake  Outcome: Progressing  Goal: Nutrition support goals are met within 48 hrs  Outcome: Progressing  Goal: Nutrition support is meeting 75% of nutrient needs  Outcome: Progressing  Goal: Tube feed tolerance  Outcome: Progressing  Goal: BG  mg/dL  Outcome: Progressing  Goal: Lab values WNL  Outcome: Progressing  Goal: Electrolytes WNL  Outcome: Progressing  Goal: Promote healing  Outcome: Progressing  Goal: Maintain stable weight  Outcome: Progressing  Goal: Reduce weight from edema/fluid  Outcome: Progressing  Goal: Gradual weight gain  Outcome: Progressing  Goal: Improve ostomy output  Outcome: Progressing     Problem: Respiratory  Goal: Clear secretions with interventions this shift  Outcome: Progressing  Goal: Minimize anxiety/maximize coping throughout shift  Outcome: Progressing  Goal: Minimal/no exertional discomfort or dyspnea this shift  Outcome: Progressing  Goal: No signs of respiratory distress (eg. Use of accessory muscles. Peds grunting)  Outcome: Progressing  Goal: Patent airway maintained this shift  Outcome: Progressing  Goal: Verbalize decreased shortness of breath this shift  Outcome: Progressing  Goal: Wean oxygen to maintain O2 saturation per order/standard this shift  Outcome: Progressing     Problem: Pain  Goal: My pain/discomfort is manageable  Outcome: Progressing     Problem: Safety  Goal: Patient will be injury free during hospitalization  Outcome: Progressing  Goal: I will remain free of falls  Outcome: Progressing     Problem: Daily Care  Goal: Daily care needs are met  Outcome: Progressing     Problem: Psychosocial Needs  Goal: Demonstrates ability to cope with  hospitalization/illness  Outcome: Progressing  Goal: Collaborate with me, my family, and caregiver to identify my specific goals  Outcome: Progressing     Problem: Discharge Barriers  Goal: My discharge needs are met  Outcome: Progressing     Problem: Pain  Goal: Takes deep breaths with improved pain control throughout the shift  Outcome: Progressing  Goal: Turns in bed with improved pain control throughout the shift  Outcome: Progressing  Goal: Walks with improved pain control throughout the shift  Outcome: Progressing  Goal: Performs ADL's with improved pain control throughout shift  Outcome: Progressing  Goal: Participates in PT with improved pain control throughout the shift  Outcome: Progressing  Goal: Free from opioid side effects throughout the shift  Outcome: Progressing  Goal: Free from acute confusion related to pain meds throughout the shift  Outcome: Progressing

## 2023-12-18 NOTE — PROGRESS NOTES
Occupational Therapy                 Therapy Communication Note    Patient Name: Ayanna Gross  MRN: [id number]  Today's Date: 12/18/2023     Discipline: Occupational Therapy    Missed Visit Reason: Missed Visit Reason: Patient in a medical procedure (Pt currently on HD- no OT tx completed at this time.)    Missed Time: Attempt    Comment: Attempt at 0904

## 2023-12-18 NOTE — PROGRESS NOTES
"Nutrition Follow up Note    Nutrition Assessment      Patient transferred from SDU to ICU unit 12/16. Covid19 isolation. Spoke with RN, po intake continues to be low will encourage patient.    Nutrition History:  Food and Nutrient History: RN reports about 50% intake  Energy Intake: Fair 50-75 %  Food Allergies/Intolerances:  None  GI Symptoms: None  Oral Problems: None    Anthropometrics:  Ht: 157.5 cm (5' 2.01\"), Wt: 73.5 kg (162 lb 0.6 oz), BMI: 29.63  IBW/kg (Dietitian Calculated): 50 kg  Percent of IBW: 145 %  Adjusted Body Weight (kg): 55.7 kg    Weight Change:     Nutrition Focused Physical Exam Findings:   Subcutaneous Fat Loss  Buccal Fat Pads: Well nourished (full, rounded cheeks)  Triceps: Mild-moderate (less than ample fat tissue)    Muscle Wasting  Temporalis: Well nourished (well-defined muscle)  Pectoralis (Clavicular Region): Well nourished (clavicle not visible)  Deltoid/Trapezius: Mild-Moderate (slight protrusion of acromion process)         Physical Findings (Nutrition Deficiency/Toxicity)  Skin:  (bilateral diabetic ulcer non-healing wounds, groin bilateral wound, and buttocks bilateral wound (12/6))    Nutrition Significant Labs:  Lab Results   Component Value Date    WBC 12.7 (H) 12/18/2023    HGB 10.7 (L) 12/18/2023    HCT 31.9 (L) 12/18/2023     (L) 12/18/2023    CHOL 104 (L) 07/01/2023    TRIG 155 (H) 07/01/2023    HDL 30 (L) 07/01/2023    ALT 21 12/07/2023    AST 40 12/07/2023     12/18/2023    K 3.3 (L) 12/18/2023    CL 99 12/18/2023    CREATININE 3.60 (H) 12/18/2023    BUN 25 12/18/2023    CO2 23 (L) 12/18/2023    TSH 1.56 02/04/2020    INR 1.1 08/20/2023    HGBA1C 8.8 (H) 06/25/2023    ALBUR 1,315 (H) 02/24/2019       Current Facility-Administered Medications:     acetaminophen (Tylenol) tablet 650 mg, 650 mg, oral, q4h PRN, LILLIE Carrasquillo    acetaminophen (Tylenol) tablet 650 mg, 650 mg, oral, q4h PRN, LILLIE Carrasquillo, 650 mg at 12/16/23 2021    " albumin human 25 % solution 12.5 g, 12.5 g, intravenous, q1h PRN, MADHURI Carrasquillo-CNP, Stopped at 12/15/23 0853    apixaban (Eliquis) tablet 2.5 mg, 2.5 mg, oral, BID, MADHURI Carrasquillo-CNP, 2.5 mg at 12/18/23 0857    calcium acetate (Phoslo) capsule 667 mg, 667 mg, oral, TID with meals, MADHURI Carrasquillo-CNP, 667 mg at 12/18/23 0858    collagenase 250 unit/gram ointment, , Topical, Daily, MADHURI Carrasquillo-CNP, Given at 12/17/23 0902    dextrose 10 % in water (D10W) infusion, 0.3 g/kg/hr, intravenous, Once PRN, MADHURI Carrasquillo-CNP    dextrose 50 % injection 25 g, 25 g, intravenous, q15 min PRN, MADHURI Carrasquillo-CNP, 25 g at 12/11/23 1658    doxycycline (Vibramycin) capsule 100 mg, 100 mg, oral, Daily, MADHURI Carrasquillo-CNP, 100 mg at 12/18/23 0858    epoetin di-epbx (Retacrit) injection 10,000 Units, 10,000 Units, intravenous, Every Mon/Wed/Fri, MADHURI Carrasquillo-CNP, 10,000 Units at 12/15/23 1239    escitalopram (Lexapro) tablet 10 mg, 10 mg, oral, Daily, MADHURI Carrasquillo-CNP, 10 mg at 12/18/23 0858    febuxostat (Uloric) tablet 40 mg, 40 mg, oral, Every other day, MADHURI Carrasquillo-CNP, 40 mg at 12/17/23 1133    fenofibrate (Triglide) tablet 160 mg, 160 mg, oral, Daily, Inez Josue APRN-CNP, 160 mg at 12/18/23 0858    fidaxomicin (Dificid) tablet 200 mg, 200 mg, oral, BID, Stephen Coulter MD, 200 mg at 12/18/23 0857    gabapentin (Neurontin) capsule 100 mg, 100 mg, oral, BID, MADHUIR Carrasquillo-CNP, 100 mg at 12/18/23 0858    glucagon (Glucagen) injection 1 mg, 1 mg, intramuscular, q15 min PRN, LILLIE Carrasquillo    heparin 1,000 unit/mL injection 2,000 Units, 2,000 Units, intra-catheter, After Dialysis, MADHURI Carrasquillo-CNP, 1,600 Units at 12/13/23 1201    heparin 1,000 unit/mL injection 2,000 Units, 2,000 Units, intra-catheter, After Dialysis, MADHURI Carrasquillo-CNP, 1,600 Units at 12/13/23 1159    heparin 1,000 unit/mL  injection 2,000 Units, 2,000 Units, intra-catheter, After Dialysis, Saeed Mondragon MD    heparin 1,000 unit/mL injection 2,000 Units, 2,000 Units, intra-catheter, After Dialysis, Saeed Mondragon MD    [START ON 12/19/2023] heparin 1,000 unit/mL injection 2,000 Units, 2,000 Units, intra-catheter, After Dialysis, Saeed Mondragon MD    [START ON 12/19/2023] heparin 1,000 unit/mL injection 2,000 Units, 2,000 Units, intra-catheter, After Dialysis, Saeed Mondragon MD    insulin lispro (HumaLOG) injection 0-5 Units, 0-5 Units, subcutaneous, TID with meals, LILLIE Carrasquillo, 1 Units at 12/17/23 1800    ipratropium-albuteroL (Duo-Neb) 0.5-2.5 mg/3 mL nebulizer solution 3 mL, 3 mL, nebulization, TID, LILLIE Carrasquillo, 3 mL at 12/18/23 0650    metoprolol succinate XL (Toprol-XL) 24 hr tablet 12.5 mg, 12.5 mg, oral, Daily, LILLIE Carrasquillo, 12.5 mg at 12/13/23 1327    midodrine (Proamatine) tablet 15 mg, 15 mg, oral, TID with meals, LILLIE Carrasquillo, 15 mg at 12/18/23 0858    norepinephrine (Levophed) 8 mg in dextrose 5% 250 mL (0.032 mg/mL) infusion (premix), 0.01-3 mcg/kg/min, intravenous, Continuous, Klaudia Figueroa, DO, Last Rate: 9.31 mL/hr at 12/18/23 0835, 0.07 mcg/kg/min at 12/18/23 0835    nystatin (Mycostatin) 100,000 unit/gram powder 1 Application, 1 Application, Topical, BID, LILLIE Carrasquillo, 1 Application at 12/17/23 2123    oxygen (O2) therapy, , inhalation, Continuous PRN - O2/gases, Klaudia Figueroa DO, 3 L/min at 12/18/23 0930    piperacillin-tazobactam-dextrose (Zosyn) IV 2.25 g, 2.25 g, intravenous, q6h, LILLIE Carrasquillo, Last Rate: 100 mL/hr at 12/18/23 0749, 2.25 g at 12/18/23 0749    polyethylene glycol (Glycolax, Miralax) packet 17 g, 17 g, oral, Daily PRN, LILLIE Carrasquillo    simvastatin (Zocor) tablet 40 mg, 40 mg, oral, Nightly, LILLIE Carrasquillo, 40 mg at 12/17/23 2123    zinc oxide 20 % ointment 1  Application, 1 Application, Topical, q1h PRN, Inez Josue, APRN-CNP  Propofol @  ml/hr provides:  kcals/day    Dietary Orders (From admission, onward)       Start     Ordered    12/14/23 1146  Adult diet Renal; Potassium Restricted 2 gm (50mEq); 2 - 3 grams Sodium; Thin 0  Diet effective now        Comments: Compensatory Swallowing Strategies:    Assisst with meals                                                                                                                                                                                                                                                       Upright 90 degrees as possible for all oral intake, single sips/bites, slow rate eating/feeding, alternate liquids and solids, upright after meals   Question Answer Comment   Diet type Renal    Potassium restriction: Potassium Restricted 2 gm (50mEq)    Sodium restriction: 2 - 3 grams Sodium    Fluid consistency Thin 0        12/14/23 1146    12/07/23 1503  Oral nutritional supplements  Until discontinued        Comments: Vanilla   Question Answer Comment   Deliver with Breakfast    Deliver with Dinner    Select supplement: Sugar Free Mighty Shake        12/07/23 1503                  Nutrition Support Intake provides:       Estimated Needs:   Estimated Energy Needs  Total Energy Estimated Needs (kCal):  (5576-9929 kcals)  Total Estimated Energy Need per Day (kCal/kg):  (30-35 kcals/kg)  Method for Estimating Needs: adjusted wt    Estimated Protein Needs  Total Protein Estimated Needs (g):  (67-84)  Total Protein Estimated Needs (g/kg):  (1.2-1.5)  Method for Estimating Needs: adjusted wt    Estimated Fluid Needs  Total Fluid Estimated Needs (mL):  (6329-2619 mL)  Method for Estimating Needs: UO + 500-1000 ml        Nutrition Diagnosis   Nutrition Diagnosis:  Malnutrition Diagnosis  Patient has Malnutrition Diagnosis: Yes  Diagnosis Status: Ongoing  Malnutrition Diagnosis: Moderate malnutrition related to  acute disease or injury  As Evidenced by: 1-2% unintentional wt loss in 1 week (~4% in 2 weeks), Poor intake <75% of estimated energy requirement > 7days, and mild fat and muscle deficits    Nutrition Diagnosis  Patient has Nutrition Diagnosis: Yes  Diagnosis Status (1): Ongoing  Nutrition Diagnosis 1: Increased nutrient needs  Related to (1): increased demand for nutrient  As Evidenced by (1): diabetic ulcers and wounds     Nutrition Interventions/Recommendations   Nutrition Interventions and Recommendations:    Nutrition Prescription:  Individualized Nutrition Prescription Provided for : 1566-1578 kcal and 67-84g protein provided via renal diet and supplements    Nutrition Interventions:        Education Documentation  No documentation found.         Nutrition Monitoring and Evaluation   Monitoring/Evaluation:   Food/Nutrient Related History Monitoring  Monitoring and Evaluation Plan: Energy intake  Energy Intake: Estimated energy intake  Criteria: Pt will tolerate >75% of estimated energy needs  Fluid Intake: Estimated fluid intake  Criteria: Pt will tolerate >75% of supplements    Body Composition/Growth/Weight History  Monitoring and Evaluation Plan: Weight  Weight: Measured weight  Criteria: Pt will maintain wt    Nutrition Focused Physical Findings  Monitoring and Evaluation Plan: Skin  Skin: Impaired wound healing  Criteria: Pt will maintain/show signs of improvement in skin integrity       Time Spent/Follow-up:   Follow Up  Time Spent (min): 30 minutes  Last Date of Nutrition Visit: 12/18/23  Nutrition Follow-Up Needed?: 3-5 days  Follow up Comment: 12/22/23

## 2023-12-18 NOTE — CARE PLAN
Problem: Respiratory  Goal: Clear secretions with interventions this shift  Outcome: Progressing  Goal: Minimal/no exertional discomfort or dyspnea this shift  Outcome: Progressing  Goal: No signs of respiratory distress (eg. Use of accessory muscles. Peds grunting)  Outcome: Progressing  Goal: Verbalize decreased shortness of breath this shift  Outcome: Progressing

## 2023-12-18 NOTE — CARE PLAN
The patient's goals for the shift include rest    The clinical goals for the shift include Stable blood pressure, titrate pressors as tolerated, tolerate hemodialysis.    Over the shift, the patient did not make progress toward the following goals. Barriers to progression include poor appetite. Recommendations to address these barriers include consult with dietician, encourage oral intake, assist with feedings as needed.    Problem: Nutrition  Goal: Oral intake greater 75%  Outcome: Not Progressing  Goal: Nutrition support is meeting 75% of nutrient needs  Outcome: Not Progressing

## 2023-12-18 NOTE — PROGRESS NOTES
Speech-Language Pathology                 Therapy Communication Note    Patient Name: Ayanna Gross  MRN: [id number]  Today's Date: 12/18/2023     Discipline: Speech Language Pathology    Missed Visit Reason: Pt currently receiving dialysis, will attempt later when pt available    Missed Time: Attempt    Comment:

## 2023-12-18 NOTE — PROGRESS NOTES
Physical Therapy                 Therapy Communication Note    Patient Name: Ayanna Gross  MRN: [id number]  Today's Date: 12/18/2023     Discipline: Physical Therapy    Missed Visit Reason: Missed Visit Reason: Cancel, Patient in a medical procedure (Pt currently on dialysis. Will hold PT at this time.)    Missed Time: Cancel    Comment:

## 2023-12-18 NOTE — PROGRESS NOTES
Speech-Language Pathology    Inpatient  Speech-Language Pathology Treatment     Patient Name: Ayanna Gross  MRN: [id number]  Today's Date: 12/18/2023  Time Calculation  Start Time: 1500  Stop Time: 1520  Time Calculation (min): 20 min         Current Problem:   1. Hypotension, unspecified hypotension type  Insert arterial line    Insert arterial line    Central Line    Central Line      2. History of end stage renal disease        3. Wound infection        4. Absent pedal pulses  XR foot 3+ views bilateral    Vascular US ankle brachial index (OCTAVIO) without exercise    XR foot 3+ views bilateral    Vascular US ankle brachial index (OCTAVIO) without exercise    CANCELED: Vascular US PVR without exercise    CANCELED: Vascular US PVR without exercise        SLP Assessment:  SLP TX Intervention Outcome: Making Progress Towards Goals  Prognosis: Good  Treatment Provided: Yes   Treatment Tolerance: Patient tolerated treatment well  Medical Staff Made Aware: Yes  Barriers: Cognition, None  Education Provided: Yes       Plan:  Discharge from speech tx, goals achieved  No skilled speech tx indicated    Subjective   Pt seen in COVID isolation, seated upright in bed, lunch tray present, oriented to self and place, just completed dialysis, feeding self with assisst from SLP     General Visit Information:   Prior to Session Communication: Bedside nurse (irene melo RN)    Pain Assessment:   Pain Assessment: 0-10  Pain Score: 0 - No pain    Objective   Therapeutic Swallow:  Therapeutic Swallow Intervention : PO Trials, Compensatory Strategies  Solid Diet Recommendations: Regular (IDDSI Level 7)  Liquid Diet Recommendations: Thin (IDDSI Level 0)  Swallow Comments: Pt tolerated hamburger, and thin liquids without s/s aspiration, mastication WFL, adequate oral clearance, no further tx indicated, pt requires assisst with feeding, nursing staff aware           Inpatient:  Education Documentation  Pt/nursing education provided re: regarding  role of ST, purpose of treatment, clinical impressions, recommendations, safe swallow strategies, Pt/nurse verbalized understanding and agreement,pt requires reinforcement/assisstance

## 2023-12-18 NOTE — PROGRESS NOTES
Hemodialysis procedure note:  The patient's blood pressure remains low.  The patient is on dialysis and receiving 1 L volume removal.  She says that her diarrhea has slowed down.  She recognizes she is in the hospital.  She denies dyspnea.  She denies pain.  She really voices no other concerns to me today.  Being treated for C. difficile colitis, diagnosis of coronavirus as well.  Anticipate next hemodialysis will be on Wednesday.

## 2023-12-18 NOTE — PROGRESS NOTES
Ayanna Gross is a 73 y.o. female on day 12 of admission presenting with Hypotension, unspecified hypotension type.      Subjective     Sleeping, easily awoken. No new complaints. Oriented.        Objective     Last Recorded Vitals  BP (!) 78/36   Pulse 84   Temp 36.4 °C (97.5 °F) (Oral)   Resp 18   Wt 73.5 kg (162 lb 0.6 oz)   SpO2 98%   Intake/Output last 3 Shifts:    Intake/Output Summary (Last 24 hours) at 12/18/2023 0748  Last data filed at 12/18/2023 0700  Gross per 24 hour   Intake 1135.99 ml   Output 200 ml   Net 935.99 ml         Admission Weight  Weight: 72.6 kg (160 lb) (12/06/23 1301)    Daily Weight  12/18/23 : 73.5 kg (162 lb 0.6 oz)    Image Results  XR chest 1 view  Narrative: Interpreted By:  Judd Lee,   STUDY:  XR CHEST 1 VIEW; 12/14/2023 10:43 am      INDICATION:  Hypoxic respiratory failure.      COMPARISON:  December 8, 2023      ACCESSION NUMBER(S):  [id number]      ORDERING CLINICIAN:  RODRI SRUESH      FINDINGS:  RESULT: Right-sided double-lumen dialysis catheter is noted with  distal tip at the level of the right atrium. Vascular stents are  noted in the left subclavian region. Surgical clips are noted in the  left axilla. Cardiac silhouette size is indeterminate as the left  heart border is obscured. There are calcifications involving the  thoracic aorta. Left basilar obscurity obscures the left  hemidiaphragm and left heart border with hazy opacity extending to  the left mid lung. There are degenerative changes of the bilateral  shoulders.      Impression: Increasing density in the left mid and lower lung suggests increasing  infiltrate or pleural effusion.          Signed by: Judd Lee 12/14/2023 10:58 AM  Dictation workstation:   [id number]      Physical Exam  Constitutional:       Appearance: Normal appearance. She is not toxic-appearing.   HENT:      Head: Normocephalic.      Right Ear: External ear normal.      Left Ear: External ear normal.   Eyes:       Conjunctiva/sclera: Conjunctivae normal.   Cardiovascular:      Rate and Rhythm: Normal rate.      Heart sounds: Normal heart sounds.   Pulmonary:      Effort: Pulmonary effort is normal.      Breath sounds: Normal breath sounds.   Abdominal:      General: Abdomen is flat.      Palpations: Abdomen is soft.   Skin:     General: Skin is warm.   Neurological:      Mental Status: She is oriented to person, place, and time.   Psychiatric:         Mood and Affect: Mood normal.       General: alert, no diaphoresis   Lungs: CTA BL   Heart: RRR, no LE edema BL   GI: abdomen soft, nontender, nondistended, BS present   MSK: no joint effusion or deformity   Skin: no rashes, erythema, or ecchymosis   Neuro: grossly normal cognition, motor strength, sensation      Relevant Results               Scheduled medications  apixaban, 2.5 mg, oral, BID  calcium acetate, 667 mg, oral, TID with meals  collagenase, , Topical, Daily  doxycycline, 100 mg, oral, Daily  epoetin di or biosimilar, 10,000 Units, intravenous, Every Mon/Wed/Fri  escitalopram, 10 mg, oral, Daily  febuxostat, 40 mg, oral, Every other day  fenofibrate, 160 mg, oral, Daily  fidaxomicin, 200 mg, oral, BID  gabapentin, 100 mg, oral, BID  heparin, 2,000 Units, intra-catheter, After Dialysis  heparin, 2,000 Units, intra-catheter, After Dialysis  heparin, 2,000 Units, intra-catheter, After Dialysis  heparin, 2,000 Units, intra-catheter, After Dialysis  insulin lispro, 0-5 Units, subcutaneous, TID with meals  ipratropium-albuteroL, 3 mL, nebulization, TID  metoprolol succinate XL, 12.5 mg, oral, Daily  midodrine, 15 mg, oral, TID with meals  nystatin, 1 Application, Topical, BID  piperacillin-tazobactam, 2.25 g, intravenous, q6h  simvastatin, 40 mg, oral, Nightly      Continuous medications  norepinephrine, 0.01-3 mcg/kg/min, Last Rate: 0.05 mcg/kg/min (12/18/23 0700)      PRN medications  PRN medications: acetaminophen, acetaminophen, albumin human, dextrose 10 % in water  (D10W), dextrose, glucagon, oxygen, polyethylene glycol, zinc oxide    Results for orders placed or performed during the hospital encounter of 12/06/23 (from the past 24 hour(s))   POCT GLUCOSE   Result Value Ref Range    POCT Glucose 142 (H) 74 - 99 mg/dL   POCT GLUCOSE   Result Value Ref Range    POCT Glucose 158 (H) 74 - 99 mg/dL   CBC   Result Value Ref Range    WBC 12.0 (H) 4.4 - 11.3 x10*3/uL    nRBC 0.0 0.0 - 0.0 /100 WBCs    RBC 3.52 (L) 4.00 - 5.20 x10*6/uL    Hemoglobin 9.9 (L) 12.0 - 16.0 g/dL    Hematocrit 30.7 (L) 36.0 - 46.0 %    MCV 87 80 - 100 fL    MCH 28.1 26.0 - 34.0 pg    MCHC 32.2 32.0 - 36.0 g/dL    RDW 21.9 (H) 11.5 - 14.5 %    Platelets 113 (L) 150 - 450 x10*3/uL   POCT GLUCOSE   Result Value Ref Range    POCT Glucose 179 (H) 74 - 99 mg/dL   CBC   Result Value Ref Range    WBC 12.7 (H) 4.4 - 11.3 x10*3/uL    nRBC 0.0 0.0 - 0.0 /100 WBCs    RBC 3.72 (L) 4.00 - 5.20 x10*6/uL    Hemoglobin 10.7 (L) 12.0 - 16.0 g/dL    Hematocrit 31.9 (L) 36.0 - 46.0 %    MCV 86 80 - 100 fL    MCH 28.8 26.0 - 34.0 pg    MCHC 33.5 32.0 - 36.0 g/dL    RDW 22.1 (H) 11.5 - 14.5 %    Platelets 116 (L) 150 - 450 x10*3/uL   POCT GLUCOSE   Result Value Ref Range    POCT Glucose 130 (H) 74 - 99 mg/dL        Assessment/Plan        This patient has a central line   Reason for the central line remaining today? Parenteral medication            Principal Problem:    Hypotension, unspecified hypotension type  Active Problems:    Paroxysmal atrial fibrillation (CMS/HCC)    Peripheral vascular disease (CMS/[facility])    Orthostatic hypotension    Mixed hyperlipidemia    End-stage renal disease on hemodialysis (CMS/[facility])    DM (diabetes mellitus) (CMS/HCC)    Absent pedal pulses    Shock  - unclear etiology-- suspect septic overall but could be element of hypovolemia/hypoalbuminemia given diarrhea from c diff and poor nutritional status  - on levophed, titrating for goal of SBP >80. She has chronic hypotension on midodrine  - Empiric  Zosyn per ID    A fibrillation  Continue low-dose beta blocker   Eliquis for stroke prophylaxis      Mixed hyperlipidemia  Continue statin, fenofibrate      DM (diabetes mellitus)   Hold Tradjenta   AC/HS glucose with SSI coverage   Hypoglycemia protocol      Groin excoriation   Wound care consult   Nystatin powder   Keep skin clean and dry   Sacrum covered with Mepilex.      C-diff   PCR positive and negative toxin.   Now on fidaxomicin    ESRD  HD per renal.         Malnutrition Diagnosis Status: Ongoing  Malnutrition Diagnosis: Moderate malnutrition related to acute disease or injury  As Evidenced by: 1-2% unintentional wt loss in 1 week (~4% in 2 weeks), Poor intake <75% of estimated energy requirement > 7days, and mild fat and muscle deficits  I agree with the dietitian's malnutrition diagnosis.       Needs continued ICU care for vasopressors and arterial line monitoring.            Daniel Rubio, DO

## 2023-12-18 NOTE — PROGRESS NOTES
Patient not medically clear. Patient in the ICU. At the time of discharge, patient will go to [facility]. Precert was lost and will need to be started closer to discharge. Will follow.     **PATIENT DOES NOT HAVE A SAFE DISCHARGE PLAN      Shauna Jensen RN

## 2023-12-19 NOTE — CARE PLAN
Problem: Respiratory  Goal: Minimal/no exertional discomfort or dyspnea this shift  Outcome: Progressing  Goal: No signs of respiratory distress (eg. Use of accessory muscles. Peds grunting)  Outcome: Progressing  Goal: Patent airway maintained this shift  Outcome: Progressing  Goal: Verbalize decreased shortness of breath this shift  Outcome: Progressing  Goal: Wean oxygen to maintain O2 saturation per order/standard this shift  Outcome: Progressing

## 2023-12-19 NOTE — NURSING NOTE
Pts spo2 is 89%. Pt was assessed and had oxygen pulled off. Pt was educated to leave oxygen on. Pt verbalized understanding.

## 2023-12-19 NOTE — PROGRESS NOTES
FOLLOWUP FOR: Critical care management    SUBJECTIVE  Remains on 3 L nasal cannula.  Denies any shortness of breath, cough.  Denies pain.  Remains on Levophed at 0.04, receiving oral midodrine.  Had dialysis yesterday    PHYSICAL EXAM        12/19/2023     7:00 AM 12/19/2023     7:15 AM 12/19/2023     7:30 AM 12/19/2023     7:45 AM 12/19/2023     7:55 AM 12/19/2023     8:00 AM 12/19/2023     8:15 AM   Vitals   Systolic     102     Diastolic     53     Heart Rate 90 89 91 89 90 92 91   Temp     36.2 °C (97.2 °F)     Resp 20 18 18 14 14 14 23       Vital signs reviewed   NAD, awake and alert, O2   Lungs Symmetric excursions.  Auscultation - diminished but clear, no wheezing/rales/rhonchi.     Cor RSR No murmur, rub, gallop   Abd soft and nontender, no rebound or distention, no HS megaly   Ext no CCE   Neuro no focal deficits.  moves all extremities symmetrically.  speech normal.  affect normal    LABS    Markers show hypokalemia with potassium 3.1, creatinine 2.2.  CBC shows white count 11.8.  H&H 9.9 and 30.  Platelet count 114,000    ASSESSMENT:    .  Septic shock  .  COVID-19 infection  .  C. difficile colitis  .  End-stage renal disease  .  Possible endocarditis  .  Transudative pleural effusion    PLAN    .  Continue pressors, hopefully will be able to wean off soon.  Will check serum cortisol rule out hypoadrenalism  .  Dialysis  .  Oral vancomycin  .  Systemic antibiotics, infectious disease following  .  Bronchodilators and pulmonary hygiene    Moris Scott MD, [facility]

## 2023-12-19 NOTE — NURSING NOTE
Assumed are of pt at this time. Pt on A&Ox3, follows commands, SIMEON equally, denies pain, on levophed on 3 L NC, truvue boots to BLE, pt on waffle mattress with mepilex border to sacrum, heels wrapped in gauze. Arterial line and central line to right groin and HD catheter to right chest. Call light in hand, hospital phone in reach and all personal belongings in reach. Pt denies any needs at this time.

## 2023-12-19 NOTE — PROGRESS NOTES
Ayanna Gross is a 73 y.o. female on day 13 of admission presenting with Hypotension, unspecified hypotension type.    Subjective   Interval History:    room not entered-limit exposure   patient observed through glass window  Patient discussed with nurse    on Levophed  Review of Systems    Objective   Range of Vitals (last 24 hours)  Heart Rate:  []   Temp:  [36.2 °C (97.2 °F)-37.2 °C (99 °F)]   Resp:  [11-34]   BP: ()/(30-53)   Weight:  [71.9 kg (158 lb 8.2 oz)]   SpO2:  [84 %-99 %]   Daily Weight  12/19/23 : 71.9 kg (158 lb 8.2 oz)    Body mass index is 28.98 kg/m².    Physical Exam   awake, alert    Antibiotics  sodium chloride 0.9 % bolus 500 mL  cefepime (Maxipime) 1 g in dextrose 5 % 50 mL IV  vancomycin-diluent combo no.1 (Xellia) IVPB 1 g  apixaban (Eliquis) tablet 2.5 mg  calcium acetate (Phoslo) capsule 2,000 mg  escitalopram (Lexapro) tablet 10 mg  febuxostat (Uloric) tablet 40 mg  fenofibrate (Triglide) tablet 160 mg  gabapentin (Neurontin) capsule 300 mg  metoprolol succinate XL (Toprol-XL) 24 hr tablet 12.5 mg  midodrine (Proamatine) tablet 10 mg  nystatin (Mycostatin) 100,000 unit/gram powder 1 Application  B complex-vitamin C tablet 1 tablet  simvastatin (Zocor) tablet 40 mg  acetaminophen (Tylenol) tablet 650 mg  acetaminophen (Tylenol) tablet 650 mg  polyethylene glycol (Glycolax, Miralax) packet 17 g  dextrose 50 % injection 25 g  glucagon (Glucagen) injection 1 mg  dextrose 10 % in water (D10W) infusion  insulin lispro (HumaLOG) injection 0-5 Units  zinc oxide 20 % ointment 1 Application  calcium acetate (Phoslo) capsule 667 mg  vancomycin (Vancocin) capsule 125 mg  lidocaine PF (Xylocaine) 10 mg/mL (1 %) injection  heparin 1,000 unit/mL injection 2,000 Units  heparin 1,000 unit/mL injection 2,000 Units  midodrine (Proamatine) tablet 15 mg  albumin human 25 % solution 25 g  polyethylene glycol (Glycolax, Miralax) packet 17 g  collagenase 250 unit/gram ointment  vancomycin (Vancocin)  1,750 mg in dextrose 5 % in water (D5W) 250 mL IV  vancomycin-diluent combo no.1 (Xellia) IVPB 1,750 mg  albumin human 25 % solution 12.5 g  heparin 1,000 unit/mL injection 2,000 Units  heparin 1,000 unit/mL injection 2,000 Units  vancomycin (Vancocin) placeholder  doxycycline (Vibramycin) capsule 100 mg  potassium chloride CR (Klor-Con) ER tablet 10 mEq  gabapentin (Neurontin) capsule 100 mg  heparin 1,000 unit/mL injection 2,000 Units  heparin 1,000 unit/mL injection 2,000 Units  piperacillin-tazobactam-dextrose (Zosyn) IV 2.25 g  doxycycline (Vibramycin) capsule 100 mg  ipratropium-albuteroL (Duo-Neb) 0.5-2.5 mg/3 mL nebulizer solution 3 mL  heparin 1,000 unit/mL injection 2,000 Units  heparin 1,000 unit/mL injection 2,000 Units  ipratropium-albuteroL (Duo-Neb) 0.5-2.5 mg/3 mL nebulizer solution 3 mL  dexAMETHasone (Decadron) injection 6 mg  albumin human 25 % solution 25 g  epoetin di-epbx (Retacrit) injection 10,000 Units  lidocaine PF (Xylocaine) 20 mg/mL (2 %) injection  potassium chloride CR (Klor-Con) ER tablet 10 mEq  albumin human 5 % infusion 25 g  potassium chloride CR (Klor-Con M20) ER tablet 20 mEq  norepinephrine (Levophed) 8 mg in dextrose 5% 250 mL (0.032 mg/mL) infusion (premix)  oxygen (O2) therapy  piperacillin-tazobactam-dextrose (Zosyn) IV 2.25 g  heparin 1,000 unit/mL injection 2,000 Units  heparin 1,000 unit/mL injection 2,000 Units  potassium chloride 20 mEq in 100 mL IV premix  fidaxomicin (Dificid) tablet 200 mg  norepinephrine (Levophed) 8 mg in dextrose 5% 250 mL (0.032 mg/mL) infusion (premix)  heparin 1,000 unit/mL injection 2,000 Units  heparin 1,000 unit/mL injection 2,000 Units  HYDROcodone-acetaminophen (Norco) 5-325 mg per tablet 1 tablet      Relevant Results  Labs  Results from last 72 hours   Lab Units 12/19/23  0537 12/18/23  0508 12/17/23  1425   WBC AUTO x10*3/uL 11.8* 12.7* 12.0*   HEMOGLOBIN g/dL 9.9* 10.7* 9.9*   HEMATOCRIT % 30.3* 31.9* 30.7*   PLATELETS AUTO x10*3/uL  114* 116* 113*   NEUTROS PCT AUTO % 60.9  --   --    LYMPHS PCT AUTO % 23.3  --   --    MONOS PCT AUTO % 9.7  --   --    EOS PCT AUTO % 3.5  --   --      Results from last 72 hours   Lab Units 12/19/23  0537 12/18/23  0747 12/17/23  0407   SODIUM mmol/L 134 135 136   POTASSIUM mmol/L 3.1* 3.3* 3.0*   CHLORIDE mmol/L 97 99 98   CO2 mmol/L 24 23* 25   BUN mg/dL 10 25 18   CREATININE mg/dL 2.20* 3.60* 2.70*   GLUCOSE mg/dL 126* 140* 193*   CALCIUM mg/dL 8.5 9.0 8.5   ANION GAP mmol/L 13 13 13   EGFR mL/min/1.73m*2 23* 13* 18*   PHOSPHORUS mg/dL 0.5* 0.9* 0.9*     Results from last 72 hours   Lab Units 12/19/23  0537 12/18/23  0747 12/17/23  0407   ALBUMIN g/dL 3.0* 3.1* 3.2*     Estimated Creatinine Clearance: 21.8 mL/min (A) (by C-G formula based on SCr of 2.2 mg/dL (H)).  CRP   Date Value Ref Range Status   06/23/2023 19.9 (H) 0 - 2.0 MG/DL Final     Comment:     Performed at [address] [address]   05/22/2022 1.0 0 - 2.0 MG/DL Final     Comment:     Performed at [address] [address]   12/13/2020 10.1 (H) 0 - 2.0 MG/DL Final     Comment:     Performed at [address] [address]     Microbiology   reviewed  Imaging  XR chest 1 view    Result Date: 12/19/2023  Interpreted By:  Judd Lee, STUDY: XR CHEST 1 VIEW; 12/19/2023 5:44 am   INDICATION: Signs/Symptoms:pleural effusion.   COMPARISON: December 14, 2023   ACCESSION NUMBER(S): [id number]   ORDERING CLINICIAN: PARIS LUCAS   FINDINGS: RESULT: Right sided central venous catheter is again noted extending into the right atrium. The distal tip is not well visualized. Left-sided vascular stents are noted in the left subclavian region. Left axillary surgical clips are noted. The cardiac silhouette is within normal limits for size. Mediastinal contours are unremarkable. Left basilar opacity obscures the left hemidiaphragm with hazy opacity throughout the left lung. There are degenerative  changes of the thoracic spine and shoulders.       Persistent left basilar opacity obscures the left hemidiaphragm with hazy opacity extending through the left hemithorax. Findings likely represent pleural effusion atelectasis. Underlying infiltrate or mass is not excluded. Overall findings not significantly changed compared to previous exam     Signed by: Judd Lee 12/19/2023 9:02 AM Dictation workstation:   [id number]    XR chest 1 view    Result Date: 12/14/2023  Interpreted By:  Judd Lee, STUDY: XR CHEST 1 VIEW; 12/14/2023 10:43 am   INDICATION: Hypoxic respiratory failure.   COMPARISON: December 8, 2023   ACCESSION NUMBER(S): [id number]   ORDERING CLINICIAN: RODRI SURESH   FINDINGS: RESULT: Right-sided double-lumen dialysis catheter is noted with distal tip at the level of the right atrium. Vascular stents are noted in the left subclavian region. Surgical clips are noted in the left axilla. Cardiac silhouette size is indeterminate as the left heart border is obscured. There are calcifications involving the thoracic aorta. Left basilar obscurity obscures the left hemidiaphragm and left heart border with hazy opacity extending to the left mid lung. There are degenerative changes of the bilateral shoulders.       Increasing density in the left mid and lower lung suggests increasing infiltrate or pleural effusion.     Signed by: Judd Lee 12/14/2023 10:58 AM Dictation workstation:   [id number]    Vascular US ankle brachial index (OCTAVIO) without exercise    Result Date: 12/13/2023           [place] [address]            Phone [phone]  Vascular Lab Report  [facility] US ANKLE BRACHIAL INDEX (OCTAVIO) WITHOUT EXERCISE Patient Name:      BASIA Gray Physician: [id number] Lizet Messer MD Study Date:        12/11/2023          Ordering Provider: [id number] MARIANN SUTTON MRN/PID:            [id number]            Fellow: Accession#:        [id number]        Technologist:      Luz Maria Trivedi RVT Date of Birth/Age: 1950 / 73 years Technologist 2:    Dara Parr RVT Gender:            F                   Encounter#:        [id number] Admission Status:  Inpatient           Location           [facility]                                        Performed:  Diagnosis/ICD: Other specified symptoms and signs involving the circulatory and                respiratory systems-R09.89 Indication:    Peripheral vascular disease CPT Codes:     86597.52 Peripheral artery OCTAVIO Only Reduced Service  Pertinent History: Absent pedal pulses. Patient is on dialysis.  CONCLUSIONS: Right Lower PVR: Evidence of moderate arterial occlusive disease in the right lower extremity at rest. Right pressures of >220 mmHg suggest no compressibility of vessels and may make absolute Segmental Limb Pressures (SLP) unreliable. Decreased digital perfusion noted. Biphasic flow is noted in the right dorsalis pedis artery. Left Lower PVR: Evidence of moderate arterial occlusive disease in the left lower extremity at rest. Left pressures of >220 mmHg suggest no compressibility of vessels and may make absolute Segmental Limb Pressures (SLP) unreliable. Decreased digital perfusion noted. Biphasic flow is noted in the left dorsalis pedis artery. Additional Findings: Technically difficult and limited exam due to patient's positioning, movement and inability to cooperate with exam.  Imaging & Doppler Findings:  RIGHT Lower PVR              Pressures Right Dorsalis Pedis (Ankle) 255 mmHg   LEFT Lower PVR              Pressures Left Dorsalis Pedis (Ankle) 255 mmHg   [id number] Lizet Messer MD Electronically signed by [id number] Lizet Messer MD on 12/13/2023 at 8:38:56 AM  ** Final **     CT head wo IV contrast    Result Date: 12/11/2023  Interpreted By:  Brendon Hook, STUDY: CT HEAD WO IV CONTRAST; 12/11/2023 5:20 pm   INDICATION:  Signs/Symptoms:acute confusion.   COMPARISON: 20 August 2023   ACCESSION NUMBER(S): [id number]   ORDERING CLINICIAN: RODRI SURESH   TECHNIQUE: CT was performed with one or more of the following dose reduction techniques: automated exposure control, adjustment of the mA and/or kV according to patient size, or use of iterative reconstruction technique.       PROCEDURE: 3.0 mm axial images were obtained through the brain, to include the posterior fossa without intravenous contrast enhancement.   FINDINGS: The patient's head is turned to the left and slightly canted in the gantry. Mild motion artifact is seen. There is symmetric volume loss of the cerebral hemispheres. Moderate  decreased attenuation in the bilateral periventricular white matter, consistent with microangiopathy is noted.  There is no encephalomalacic change. Brainstem is unremarkable. Cerebellar hemispheres are symmetric. No subarachnoid, intraparenchymal, subdural or interventricular hemorrhage. No intra- or extra-axial mass or abnormal blood products are demonstrated. Hyperostosis frontalis interna is seen. Slight hypoplasia of the left mastoid.   The paranasal sinuses and mastoids as well as calvarium are unremarkable.       1. No acute intracranial findings. 2. Symmetric volume loss of the cerebral hemispheres. 3. Periventricular Microangiopathy. 4. No posttraumatic abnormality.   This report has been produced using speech recognition. This exam is available in DICOM format to non-affiliated healthcare facilities on a secure media free searchable basis with prior patient authorization. The patient exposure is reported to a radiation dose index registry. All CT examinations are performed with one or more of the following dose reduction techniques: Automated Exposure Control, Adjustment of mA and/or KV according to patient size, or use of iterative reconstruction techniques.   MACRO: None   Signed by: Brendon Hook 12/11/2023 7:05 PM Dictation  workstation:   [id number]    US thoracentesis    Result Date: 12/8/2023  Interpreted By:  Sandro Hernandez, STUDY: US THORACENTESIS; 12/7/2023 3:16 pm   INDICATION: Signs/Symptoms:Left pleural effusion question of whether this needs thoracentesis, history of ESRD, just had dialysis today.   COMPARISON: None   ACCESSION NUMBER(S): [id number]   ORDERING CLINICIAN: JEZ LONG   TECHNIQUE: Informed consent obtained. Patient positionedsitting upright. Skin prepped, draped and anesthetized. Under ultrasound guidance, a centesis catheter/needle was advanced into rightpleural cavity.   FINDINGS: A total of 750 cc of clear yellow fluid was aspirated. A sample was sent for analysis. The patient tolerated the procedure well.       Ultrasound-guided left thoracentesis.     Signed by: Sandro Hernandez 12/8/2023 3:01 PM Dictation workstation:   [id number]    XR chest 1 view    Result Date: 12/8/2023  Interpreted By:  Maria Garcia, STUDY: XR CHEST 1 VIEW 12/8/2023 11:57 am   INDICATION: Signs/Symptoms:S/P thoracentesis   COMPARISON: 12/06/2023   ACCESSION NUMBER(S): [id number]   ORDERING CLINICIAN: JEZ LONG   TECHNIQUE: AP erect view of the chest at bedside   FINDINGS: There is an interval decrease in size of left pleural effusion when compared with the study done 2 days earlier. No pneumothorax is seen.   There is some residual left pleural effusion with infiltrates seen in the left mid and lower lung field. The right lung is clear.   There are surgical clips within the left axilla. Stent grafts are visible in the left subclavian region extending into the left axillary region. A tunneled right jugular dialysis catheter terminates within right atrium.       Decreased size of left effusion following ultrasound-guided thoracentesis with no pneumothorax.   There is some residual left pleural effusion noted with infiltrate in the left mid and lower lung field.   Signed by: Maria Garcia 12/8/2023 12:51 PM Dictation  workstation:   [id number]    XR foot 3+ views bilateral    Result Date: 12/7/2023  Interpreted By:  Brendon Hook, STUDY: XR FOOT 3+ VIEWS BILATERAL; 12/7/2023 3:31 pm   INDICATION: Signs/Symptoms:Wound/Abscess/Acute neha process rule out/Comparative Exam.   COMPARISON: None   ACCESSION NUMBER(S): [id number]   ORDERING CLINICIAN: JOSSELYN STAHL   TECHNIQUE: Nonweightbearing AP, lateral and oblique views of the bilateral foot were performed.   FINDINGS: Right foot: Large heel spur is seen. Mild Achilles enthesopathy demonstrated. Moderate atherosclerotic disease is seen. There is suggestion of loss of the arch on this nonweightbearing view. Degenerative change anterior plafond demonstrated and dorsal talus as well as posterior plafond and dorsal talus. Sclerosis anterior calcaneus is seen. Degenerative changes mild between the midfoot and forefoot. 1st metatarsophalangeal joint degenerative changes demonstrated. 2nd ray absent phalanges. Distal phalanges of the 3rd through 5th rays with overlap were difficult to visualize. Distal phalanx of the great toe is difficult to visualize.   Left foot: Large heel spur is seen. Advanced atherosclerotic disease demonstrated. Degenerative change in the calcaneus and talus seen with moderate anterior and mild posterior plafond degenerative change. Mild degenerative change in the midfoot and forefoot is seen. Distal phalanx of the great toe is absent. Degenerative change of the 2nd ray phalanges and 3rd ray phalanges seen with degenerative change of the 4th and 5th digits not well seen.       1. Right foot: Large heel spur. Atherosclerotic disease. Suggestion of diffuse osteopenia. No sclerosis to suggest osteomyelitis. No subcutaneous air to suggest gangrene. Absent flanges 2nd ray with difficult to visualized distal phalanges of the other digits.   2. Left foot: Large heel spur. Degenerative changes of the hindfoot midfoot and forefoot with atherosclerotic disease. Absent  distal phalanx of the great toe. No periostitis or abnormal sclerosis with diffuse osteopenia to suggest osteomyelitis. No subcutaneous air to suggest gangrene.       Signed by: Brendon Hook 12/7/2023 8:39 PM Dictation workstation:   [id number]    ECG 12 Lead    Result Date: 12/7/2023   Poor data quality, interpretation may be adversely affected Normal sinus rhythm Right bundle branch block Septal infarct , age undetermined Possible Lateral infarct , age undetermined Abnormal ECG No previous ECGs available Confirmed by Hammad Callahan ([id number]) on 12/7/2023 1:07:11 PM    XR chest 1 view    Result Date: 12/6/2023  Interpreted By:  Roger Murdock, STUDY: XR CHEST 1 VIEW;  12/6/2023 2:08 pm   INDICATION: Signs/Symptoms:Brief AMS and hypotension, now resolved.   COMPARISON: 08/21/2023..   ACCESSION NUMBER(S): [id number]   ORDERING CLINICIAN: PATEL TINSLEY   FINDINGS: New moderate left pleural effusion. There is silhouetting of the left heart border. Left subclavian stent present. Right-sided double-lumen central venous catheter terminates within the right ventricle. No acute osseous findings.       New, moderate left pleural effusion.     MACRO: None   Signed by: Roger Murdock 12/6/2023 2:18 PM Dictation workstation:   [id number]        Assessment/Plan   Shock-etiology unclear  Unstageable Bilateral heel ulcers  C-difficile infection-resolving  Type 2 diabetes mellitus with peripheral angiopathy with gangrene   acute respiratory failure-resolved  Coronavirus infection  Pleural effusion, s/p thoracentesis  History of chronic MRSA bacteremia, infective endocarditis-completed IV vancomycin, now on Doxycycline   Hypotension-resolved  ESRD on hemodialysis  Severe malnutrition-prealbumin of 3.2           Continue Dificid  Oral doxycycline as suppressive therapy for MRSA bacteremia with possible infective endocarditis  Continue Zosyn-empiric therapy  Monitor temperature WBC  Local care  Offloading  High-protein diet  Monitor  stool  Podiatry follow up  Wean off pressors   droplet plus precautions  Contact plus precautions      Stephen Coulter MD

## 2023-12-19 NOTE — NURSING NOTE
Pt pulled off pulse ox. Pt was educated to not pull at tubes and lines. Pt verbalized understanding.

## 2023-12-19 NOTE — CARE PLAN
The patient's goals for the shift include rest  Problem: Diabetes  Goal: Achieve decreasing blood glucose levels by end of shift  Outcome: Progressing  Goal: Increase stability of blood glucose readings by end of shift  Outcome: Progressing  Goal: Maintain electrolyte levels within acceptable range throughout shift  Outcome: Progressing  Goal: Maintain glucose levels >70mg/dl to <250mg/dl throughout shift  Outcome: Progressing  Goal: No changes in neurological exam by end of shift  Outcome: Progressing  Goal: Learn about and adhere to nutrition recommendations by end of shift  Outcome: Progressing  Goal: Vital signs within normal range for age by end of shift  Outcome: Progressing  Goal: Increase self care and/or family involovement by end of shift  Outcome: Progressing  Goal: Receive DSME education by end of shift  Outcome: Progressing     Problem: Pain - Adult  Goal: Verbalizes/displays adequate comfort level or baseline comfort level  Outcome: Progressing     Problem: Safety - Adult  Goal: Free from fall injury  Outcome: Progressing     Problem: Discharge Planning  Goal: Discharge to home or other facility with appropriate resources  Outcome: Progressing     Problem: Chronic Conditions and Co-morbidities  Goal: Patient's chronic conditions and co-morbidity symptoms are monitored and maintained or improved  Outcome: Progressing     Problem: Skin  Goal: Decreased wound size/increased tissue granulation at next dressing change  Outcome: Progressing  Goal: Participates in plan/prevention/treatment measures  Outcome: Progressing  Goal: Prevent/manage excess moisture  Outcome: Progressing  Goal: Prevent/minimize sheer/friction injuries  Outcome: Progressing  Goal: Promote/optimize nutrition  Outcome: Progressing  Goal: Promote skin healing  Outcome: Progressing     Problem: Fall/Injury  Goal: Not fall by end of shift  Outcome: Progressing  Goal: Be free from injury by end of the shift  Outcome: Progressing  Goal:  Verbalize understanding of personal risk factors for fall in the hospital  Outcome: Progressing  Goal: Verbalize understanding of risk factor reduction measures to prevent injury from fall in the home  Outcome: Progressing  Goal: Pace activities to prevent fatigue by end of the shift  Outcome: Progressing     Problem: Nutrition  Goal: Oral intake greater than 50%  Outcome: Progressing  Goal: Oral intake greater 75%  Outcome: Progressing  Goal: Consume prescribed supplement  Outcome: Progressing  Goal: Adequate PO fluid intake  Outcome: Progressing  Goal: Nutrition support goals are met within 48 hrs  Outcome: Progressing  Goal: Nutrition support is meeting 75% of nutrient needs  Outcome: Progressing  Goal: Lab values WNL  Outcome: Progressing  Goal: Electrolytes WNL  Outcome: Progressing  Goal: Promote healing  Outcome: Progressing  Goal: Maintain stable weight  Outcome: Progressing  Goal: Reduce weight from edema/fluid  Outcome: Progressing  Goal: Gradual weight gain  Outcome: Progressing     Problem: Respiratory  Goal: Clear secretions with interventions this shift  Outcome: Progressing  Goal: Minimize anxiety/maximize coping throughout shift  Outcome: Progressing  Goal: Minimal/no exertional discomfort or dyspnea this shift  Outcome: Progressing  Goal: No signs of respiratory distress (eg. Use of accessory muscles. Peds grunting)  Outcome: Progressing  Goal: Patent airway maintained this shift  Outcome: Progressing  Goal: Verbalize decreased shortness of breath this shift  Outcome: Progressing  Goal: Wean oxygen to maintain O2 saturation per order/standard this shift  Outcome: Progressing     Problem: Pain  Goal: My pain/discomfort is manageable  Outcome: Progressing     Problem: Safety  Goal: Patient will be injury free during hospitalization  Outcome: Progressing  Goal: I will remain free of falls  Outcome: Progressing     Problem: Daily Care  Goal: Daily care needs are met  Outcome: Progressing     Problem:  Psychosocial Needs  Goal: Demonstrates ability to cope with hospitalization/illness  Outcome: Progressing  Goal: Collaborate with me, my family, and caregiver to identify my specific goals  Outcome: Progressing     Problem: Discharge Barriers  Goal: My discharge needs are met  Outcome: Progressing     Problem: Pain  Goal: Takes deep breaths with improved pain control throughout the shift  Outcome: Progressing  Goal: Turns in bed with improved pain control throughout the shift  Outcome: Progressing  Goal: Walks with improved pain control throughout the shift  Outcome: Progressing  Goal: Performs ADL's with improved pain control throughout shift  Outcome: Progressing  Goal: Participates in PT with improved pain control throughout the shift  Outcome: Progressing  Goal: Free from opioid side effects throughout the shift  Outcome: Progressing  Goal: Free from acute confusion related to pain meds throughout the shift  Outcome: Progressing       The clinical goals for the shift include wean pressors as tolerated

## 2023-12-19 NOTE — PROGRESS NOTES
Ayanna Gross is a 73 y.o. female on day 13 of admission presenting with Hypotension, unspecified hypotension type.      Subjective     No new complaints.  No worsening shortness of breath.  Asking about getting home for Baljinder.           Objective     Last Recorded Vitals  BP (!) 69/30   Pulse 87   Temp 36.8 °C (98.2 °F) (Axillary)   Resp 17   Wt 71.9 kg (158 lb 8.2 oz)   SpO2 94%   Intake/Output last 3 Shifts:    Intake/Output Summary (Last 24 hours) at 12/19/2023 1226  Last data filed at 12/19/2023 1146  Gross per 24 hour   Intake 1428.91 ml   Output 1400 ml   Net 28.91 ml         Admission Weight  Weight: 72.6 kg (160 lb) (12/06/23 1301)    Daily Weight  12/19/23 : 71.9 kg (158 lb 8.2 oz)    Image Results  XR chest 1 view  Narrative: Interpreted By:  Judd Lee,   STUDY:  XR CHEST 1 VIEW; 12/19/2023 5:44 am      INDICATION:  Signs/Symptoms:pleural effusion.      COMPARISON:  December 14, 2023      ACCESSION NUMBER(S):  [id number]      ORDERING CLINICIAN:  PARIS LUCAS      FINDINGS:  RESULT: Right sided central venous catheter is again noted extending  into the right atrium. The distal tip is not well visualized.  Left-sided vascular stents are noted in the left subclavian region.  Left axillary surgical clips are noted. The cardiac silhouette is  within normal limits for size. Mediastinal contours are unremarkable.  Left basilar opacity obscures the left hemidiaphragm with hazy  opacity throughout the left lung. There are degenerative changes of  the thoracic spine and shoulders.      Impression: Persistent left basilar opacity obscures the left hemidiaphragm with  hazy opacity extending through the left hemithorax. Findings likely  represent pleural effusion atelectasis. Underlying infiltrate or mass  is not excluded. Overall findings not significantly changed compared  to previous exam          Signed by: Judd Lee 12/19/2023 9:02 AM  Dictation workstation:   [id number]      Physical  Exam  Constitutional:       Appearance: Normal appearance. She is not toxic-appearing.   HENT:      Head: Normocephalic.      Right Ear: External ear normal.      Left Ear: External ear normal.   Eyes:      Conjunctiva/sclera: Conjunctivae normal.   Cardiovascular:      Rate and Rhythm: Normal rate.      Heart sounds: Normal heart sounds.   Pulmonary:      Effort: Pulmonary effort is normal.      Breath sounds: Normal breath sounds.   Abdominal:      General: Abdomen is flat.      Palpations: Abdomen is soft.   Skin:     General: Skin is warm.   Neurological:      Mental Status: She is oriented to person, place, and time.   Psychiatric:         Mood and Affect: Mood normal.       General: alert, no diaphoresis   Lungs: CTA BL   Heart: RRR, no LE edema BL   GI: abdomen soft, nontender, nondistended, BS present   MSK: no joint effusion or deformity   Skin: no rashes, erythema, or ecchymosis   Neuro: grossly normal cognition, motor strength, sensation      Relevant Results               Scheduled medications  apixaban, 2.5 mg, oral, BID  calcium acetate, 667 mg, oral, TID with meals  collagenase, , Topical, Daily  doxycycline, 100 mg, oral, Daily  epoetin di or biosimilar, 10,000 Units, intravenous, Every Mon/Wed/Fri  escitalopram, 10 mg, oral, Daily  febuxostat, 40 mg, oral, Every other day  fenofibrate, 160 mg, oral, Daily  fidaxomicin, 200 mg, oral, BID  gabapentin, 100 mg, oral, BID  heparin, 2,000 Units, intra-catheter, After Dialysis  heparin, 2,000 Units, intra-catheter, After Dialysis  heparin, 2,000 Units, intra-catheter, After Dialysis  heparin, 2,000 Units, intra-catheter, After Dialysis  insulin lispro, 0-5 Units, subcutaneous, TID with meals  ipratropium-albuteroL, 3 mL, nebulization, TID  metoprolol succinate XL, 12.5 mg, oral, Daily  midodrine, 15 mg, oral, TID with meals  nystatin, 1 Application, Topical, BID  piperacillin-tazobactam, 2.25 g, intravenous, q6h  simvastatin, 40 mg, oral,  Nightly      Continuous medications  norepinephrine, 0.01-3 mcg/kg/min, Last Rate: 0.01 mcg/kg/min (12/19/23 1300)      PRN medications  PRN medications: acetaminophen, acetaminophen, dextrose 10 % in water (D10W), dextrose, glucagon, oxygen, polyethylene glycol, zinc oxide    Results for orders placed or performed during the hospital encounter of 12/06/23 (from the past 24 hour(s))   PST Top   Result Value Ref Range    Extra Tube Hold for add-ons.    Hepatitis B surface antigen   Result Value Ref Range    Hepatitis B Surface AG Nonreactive Nonreactive   Hepatitis B surface antibody   Result Value Ref Range    Hepatitis B Surface AB 5.1 <10.0 mIU/mL   POCT GLUCOSE   Result Value Ref Range    POCT Glucose 262 (H) 74 - 99 mg/dL   POCT GLUCOSE   Result Value Ref Range    POCT Glucose 86 74 - 99 mg/dL   Renal Function Panel   Result Value Ref Range    Glucose 126 (H) 65 - 99 mg/dL    Sodium 134 133 - 145 mmol/L    Potassium 3.1 (L) 3.4 - 5.1 mmol/L    Chloride 97 97 - 107 mmol/L    Bicarbonate 24 24 - 31 mmol/L    Urea Nitrogen 10 8 - 25 mg/dL    Creatinine 2.20 (H) 0.40 - 1.60 mg/dL    eGFR 23 (L) >60 mL/min/1.73m*2    Calcium 8.5 8.5 - 10.4 mg/dL    Phosphorus 0.5 (L) 2.5 - 4.5 mg/dL    Albumin 3.0 (L) 3.5 - 5.0 g/dL    Anion Gap 13 <=19 mmol/L   CBC and Auto Differential   Result Value Ref Range    WBC 11.8 (H) 4.4 - 11.3 x10*3/uL    nRBC 0.2 (H) 0.0 - 0.0 /100 WBCs    RBC 3.54 (L) 4.00 - 5.20 x10*6/uL    Hemoglobin 9.9 (L) 12.0 - 16.0 g/dL    Hematocrit 30.3 (L) 36.0 - 46.0 %    MCV 86 80 - 100 fL    MCH 28.0 26.0 - 34.0 pg    MCHC 32.7 32.0 - 36.0 g/dL    RDW 21.7 (H) 11.5 - 14.5 %    Platelets 114 (L) 150 - 450 x10*3/uL    Neutrophils % 60.9 40.0 - 80.0 %    Immature Granulocytes %, Automated 1.8 (H) 0.0 - 0.9 %    Lymphocytes % 23.3 13.0 - 44.0 %    Monocytes % 9.7 2.0 - 10.0 %    Eosinophils % 3.5 0.0 - 6.0 %    Basophils % 0.8 0.0 - 2.0 %    Neutrophils Absolute 7.20 (H) 1.60 - 5.50 x10*3/uL    Immature  Granulocytes Absolute, Automated 0.21 0.00 - 0.50 x10*3/uL    Lymphocytes Absolute 2.76 0.80 - 3.00 x10*3/uL    Monocytes Absolute 1.15 (H) 0.05 - 0.80 x10*3/uL    Eosinophils Absolute 0.41 (H) 0.00 - 0.40 x10*3/uL    Basophils Absolute 0.10 0.00 - 0.10 x10*3/uL   Morphology   Result Value Ref Range    RBC Morphology See Below     Target Cells Few    POCT GLUCOSE   Result Value Ref Range    POCT Glucose 109 (H) 74 - 99 mg/dL   POCT GLUCOSE   Result Value Ref Range    POCT Glucose 124 (H) 74 - 99 mg/dL        Assessment/Plan        This patient has a central line   Reason for the central line remaining today? Parenteral medication            Principal Problem:    Hypotension, unspecified hypotension type  Active Problems:    Paroxysmal atrial fibrillation (CMS/HCC)    Peripheral vascular disease (CMS/[facility])    Orthostatic hypotension    Mixed hyperlipidemia    End-stage renal disease on hemodialysis (CMS/[facility])    DM (diabetes mellitus) (CMS/[facility])    Absent pedal pulses    Shock  -  suspect septic overall but could be element of hypovolemia/hypoalbuminemia given diarrhea from c diff and poor nutritional status  - on levophed, titrating for goal systolic in the 70s  - Empiric Zosyn per ID    A fibrillation  Continue low-dose beta blocker   Eliquis for stroke prophylaxis      Mixed hyperlipidemia  Continue statin, fenofibrate      DM (diabetes mellitus)   Hold Tradjenta   AC/HS glucose with SSI coverage   Hypoglycemia protocol      Groin excoriation   Wound care consult   Nystatin powder   Keep skin clean and dry   Sacrum covered with Mepilex.      C-diff   PCR positive and negative toxin.   Now on fidaxomicin    ESRD  HD per renal.         Malnutrition Diagnosis Status: Ongoing  Malnutrition Diagnosis: Moderate malnutrition related to acute disease or injury  As Evidenced by: 1-2% unintentional wt loss in 1 week (~4% in 2 weeks), Poor intake <75% of estimated energy requirement > 7days, and mild fat and muscle deficits  I  agree with the dietitian's malnutrition diagnosis.       Needs continued ICU care for vasopressors and arterial line monitoring.            Daniel Rubio, DO

## 2023-12-20 NOTE — PROGRESS NOTES
Patient not medically clear. Patient remains in the ICU. At this time the discharge plan is to go to [facility]. Precert will be needed. Will follow.     **PATIENT DOES NOT HAVE A SAFE DISCHARGE PLAN      Shauna Jensen RN

## 2023-12-20 NOTE — CARE PLAN
Problem: Diabetes  Goal: Achieve decreasing blood glucose levels by end of shift  Outcome: Progressing  Goal: Increase stability of blood glucose readings by end of shift  Outcome: Progressing  Goal: Maintain electrolyte levels within acceptable range throughout shift  Outcome: Progressing  Goal: Maintain glucose levels >70mg/dl to <250mg/dl throughout shift  Outcome: Progressing  Goal: No changes in neurological exam by end of shift  Outcome: Progressing  Goal: Learn about and adhere to nutrition recommendations by end of shift  Outcome: Progressing  Goal: Vital signs within normal range for age by end of shift  Outcome: Progressing  Goal: Increase self care and/or family involovement by end of shift  Outcome: Progressing  Goal: Receive DSME education by end of shift  Outcome: Progressing     Problem: Pain - Adult  Goal: Verbalizes/displays adequate comfort level or baseline comfort level  Outcome: Progressing     Problem: Safety - Adult  Goal: Free from fall injury  Outcome: Progressing     Problem: Discharge Planning  Goal: Discharge to home or other facility with appropriate resources  Outcome: Progressing     Problem: Chronic Conditions and Co-morbidities  Goal: Patient's chronic conditions and co-morbidity symptoms are monitored and maintained or improved  Outcome: Progressing     Problem: Skin  Goal: Decreased wound size/increased tissue granulation at next dressing change  Outcome: Progressing  Goal: Participates in plan/prevention/treatment measures  Outcome: Progressing  Goal: Prevent/manage excess moisture  Outcome: Progressing  Goal: Prevent/minimize sheer/friction injuries  Outcome: Progressing  Goal: Promote/optimize nutrition  Outcome: Progressing  Goal: Promote skin healing  Outcome: Progressing     Problem: Fall/Injury  Goal: Not fall by end of shift  Outcome: Progressing  Goal: Be free from injury by end of the shift  Outcome: Progressing  Goal: Verbalize understanding of personal risk factors  for fall in the hospital  Outcome: Progressing  Goal: Verbalize understanding of risk factor reduction measures to prevent injury from fall in the home  Outcome: Progressing  Goal: Pace activities to prevent fatigue by end of the shift  Outcome: Progressing     Problem: Nutrition  Goal: Oral intake greater than 50%  Outcome: Progressing  Goal: Oral intake greater 75%  Outcome: Progressing  Goal: Consume prescribed supplement  Outcome: Progressing  Goal: Adequate PO fluid intake  Outcome: Progressing  Goal: Nutrition support goals are met within 48 hrs  Outcome: Progressing  Goal: Nutrition support is meeting 75% of nutrient needs  Outcome: Progressing  Goal: Lab values WNL  Outcome: Progressing  Goal: Electrolytes WNL  Outcome: Progressing  Goal: Promote healing  Outcome: Progressing  Goal: Maintain stable weight  Outcome: Progressing  Goal: Reduce weight from edema/fluid  Outcome: Progressing  Goal: Gradual weight gain  Outcome: Progressing     Problem: Respiratory  Goal: Clear secretions with interventions this shift  Outcome: Progressing  Goal: Minimize anxiety/maximize coping throughout shift  Outcome: Progressing  Goal: Minimal/no exertional discomfort or dyspnea this shift  Outcome: Progressing  Goal: No signs of respiratory distress (eg. Use of accessory muscles. Peds grunting)  Outcome: Progressing  Goal: Patent airway maintained this shift  Outcome: Progressing  Goal: Verbalize decreased shortness of breath this shift  Outcome: Progressing  Goal: Wean oxygen to maintain O2 saturation per order/standard this shift  Outcome: Progressing     Problem: Pain  Goal: My pain/discomfort is manageable  Outcome: Progressing     Problem: Safety  Goal: Patient will be injury free during hospitalization  Outcome: Progressing  Goal: I will remain free of falls  Outcome: Progressing     Problem: Daily Care  Goal: Daily care needs are met  Outcome: Progressing     Problem: Psychosocial Needs  Goal: Demonstrates ability to  cope with hospitalization/illness  Outcome: Progressing  Goal: Collaborate with me, my family, and caregiver to identify my specific goals  Outcome: Progressing     Problem: Discharge Barriers  Goal: My discharge needs are met  Outcome: Progressing     Problem: Pain  Goal: Takes deep breaths with improved pain control throughout the shift  Outcome: Progressing  Goal: Turns in bed with improved pain control throughout the shift  Outcome: Progressing  Goal: Walks with improved pain control throughout the shift  Outcome: Progressing  Goal: Performs ADL's with improved pain control throughout shift  Outcome: Progressing  Goal: Participates in PT with improved pain control throughout the shift  Outcome: Progressing  Goal: Free from opioid side effects throughout the shift  Outcome: Progressing  Goal: Free from acute confusion related to pain meds throughout the shift  Outcome: Progressing

## 2023-12-20 NOTE — NURSING NOTE
BSSR. Patient remains in covid and cdiff isolation. Patient resting in bed with eyes closed. Chest rise and fall visualized. No distress noted. Call light within reach.

## 2023-12-20 NOTE — PROGRESS NOTES
FOLLOWUP FOR: Critical care management    SUBJECTIVE  Remains on 3 L nasal cannula.  Denies any shortness of breath, cough.  Denies pain.  Remains on Levophed at 0.0, receiving oral midodrine.  Currently on dialysis    PHYSICAL EXAM        12/20/2023     6:00 AM 12/20/2023     7:00 AM 12/20/2023     7:15 AM 12/20/2023     7:30 AM 12/20/2023     7:45 AM 12/20/2023     8:00 AM 12/20/2023     8:04 AM   Vitals   Heart Rate 83 83 86 88 88 87    Temp       36.8 °C (98.2 °F)   Resp 14 15 14 15 13 6        Vital signs reviewed   NAD, awake and alert, O2   Lungs Symmetric excursions.  Auscultation - diminished but clear, no wheezing/rales/rhonchi.     Cor RSR No murmur, rub, gallop   Abd soft and nontender, no rebound or distention, no HS megaly   Ext no CCE   Neuro no focal deficits.  moves all extremities symmetrically.  speech normal.  affect normal    LABS    Serum electrolytes show potassium 3.0 otherwise are unremarkable.  Creatinine is 3.3 prior to dialysis    ASSESSMENT:    .  Septic shock  .  COVID-19 infection  .  C. difficile colitis  .  End-stage renal disease  .  Possible endocarditis  .  Transudative pleural effusion    PLAN    .  Continue pressors, hopefully will be able to wean off soon.  Although her cortisol level is normal, I would have expected for the degree of stress that she is under that would have been higher.  Need to rule out partial adrenal insufficiency and will do cosyntropin stimulation test  .  Dialysis  .  Oral vancomycin  .  Systemic antibiotics, infectious disease following  .  Bronchodilators and pulmonary hygiene    Moris Scott MD, [facility]

## 2023-12-20 NOTE — PROGRESS NOTES
"Ayanna Gross is a 73 y.o. female on day 14 of admission presenting with Hypotension, unspecified hypotension type.    Subjective   Interval History:        patient seen and examined  Afebrile, no chills   on low-dose pressors  Fecal management system in place  Small amount of loose bowel movement   requesting ice chips    Review of Systems    Objective   Range of Vitals (last 24 hours)  Heart Rate:  []   Temp:  [36.2 °C (97.2 °F)-36.8 °C (98.2 °F)]   Resp:  [6-25]   Height:  [157.5 cm (5' 2.01\")]   Weight:  [70.5 kg (155 lb 6.8 oz)]   SpO2:  [84 %-100 %]   Daily Weight  12/20/23 : 70.5 kg (155 lb 6.8 oz)    Body mass index is 28.42 kg/m².    Physical Exam  Constitutional:       Appearance: Normal appearance.   HENT:      Head: Normocephalic and atraumatic.   Eyes:      Extraocular Movements: Extraocular movements intact.      Conjunctiva/sclera: Conjunctivae normal.   Cardiovascular:      Rate and Rhythm: Regular rhythm.      Heart sounds: Normal heart sounds.   Pulmonary:      Breath sounds: Decreased breath sounds present.   Abdominal:      General: Abdomen is flat. Bowel sounds are normal.      Palpations: Abdomen is soft.   Musculoskeletal:      Cervical back: Normal range of motion and neck supple.   Skin:     General: Skin is warm and dry.   Neurological:      Mental Status: She is alert and oriented to person, place, and time.   Psychiatric:         Mood and Affect: Mood and affect normal.    Antibiotics  sodium chloride 0.9 % bolus 500 mL  cefepime (Maxipime) 1 g in dextrose 5 % 50 mL IV  vancomycin-diluent combo no.1 (Xellia) IVPB 1 g  apixaban (Eliquis) tablet 2.5 mg  calcium acetate (Phoslo) capsule 2,000 mg  escitalopram (Lexapro) tablet 10 mg  febuxostat (Uloric) tablet 40 mg  fenofibrate (Triglide) tablet 160 mg  gabapentin (Neurontin) capsule 300 mg  metoprolol succinate XL (Toprol-XL) 24 hr tablet 12.5 mg  midodrine (Proamatine) tablet 10 mg  nystatin (Mycostatin) 100,000 unit/gram powder 1 " Application  B complex-vitamin C tablet 1 tablet  simvastatin (Zocor) tablet 40 mg  acetaminophen (Tylenol) tablet 650 mg  acetaminophen (Tylenol) tablet 650 mg  polyethylene glycol (Glycolax, Miralax) packet 17 g  dextrose 50 % injection 25 g  glucagon (Glucagen) injection 1 mg  dextrose 10 % in water (D10W) infusion  insulin lispro (HumaLOG) injection 0-5 Units  zinc oxide 20 % ointment 1 Application  calcium acetate (Phoslo) capsule 667 mg  vancomycin (Vancocin) capsule 125 mg  lidocaine PF (Xylocaine) 10 mg/mL (1 %) injection  heparin 1,000 unit/mL injection 2,000 Units  heparin 1,000 unit/mL injection 2,000 Units  midodrine (Proamatine) tablet 15 mg  albumin human 25 % solution 25 g  polyethylene glycol (Glycolax, Miralax) packet 17 g  collagenase 250 unit/gram ointment  vancomycin (Vancocin) 1,750 mg in dextrose 5 % in water (D5W) 250 mL IV  vancomycin-diluent combo no.1 (Xellia) IVPB 1,750 mg  albumin human 25 % solution 12.5 g  heparin 1,000 unit/mL injection 2,000 Units  heparin 1,000 unit/mL injection 2,000 Units  vancomycin (Vancocin) placeholder  doxycycline (Vibramycin) capsule 100 mg  potassium chloride CR (Klor-Con) ER tablet 10 mEq  gabapentin (Neurontin) capsule 100 mg  heparin 1,000 unit/mL injection 2,000 Units  heparin 1,000 unit/mL injection 2,000 Units  piperacillin-tazobactam-dextrose (Zosyn) IV 2.25 g  doxycycline (Vibramycin) capsule 100 mg  ipratropium-albuteroL (Duo-Neb) 0.5-2.5 mg/3 mL nebulizer solution 3 mL  heparin 1,000 unit/mL injection 2,000 Units  heparin 1,000 unit/mL injection 2,000 Units  ipratropium-albuteroL (Duo-Neb) 0.5-2.5 mg/3 mL nebulizer solution 3 mL  dexAMETHasone (Decadron) injection 6 mg  albumin human 25 % solution 25 g  epoetin di-epbx (Retacrit) injection 10,000 Units  lidocaine PF (Xylocaine) 20 mg/mL (2 %) injection  potassium chloride CR (Klor-Con) ER tablet 10 mEq  albumin human 5 % infusion 25 g  potassium chloride CR (Klor-Con M20) ER tablet 20  mEq  norepinephrine (Levophed) 8 mg in dextrose 5% 250 mL (0.032 mg/mL) infusion (premix)  oxygen (O2) therapy  piperacillin-tazobactam-dextrose (Zosyn) IV 2.25 g  heparin 1,000 unit/mL injection 2,000 Units  heparin 1,000 unit/mL injection 2,000 Units  potassium chloride 20 mEq in 100 mL IV premix  fidaxomicin (Dificid) tablet 200 mg  norepinephrine (Levophed) 8 mg in dextrose 5% 250 mL (0.032 mg/mL) infusion (premix)  heparin 1,000 unit/mL injection 2,000 Units  heparin 1,000 unit/mL injection 2,000 Units  HYDROcodone-acetaminophen (Norco) 5-325 mg per tablet 1 tablet  dextrose 50 % injection 25 g  glucagon (Glucagen) injection 1 mg  dextrose 10 % in water (D10W) infusion  potassium chloride (Klor-Con) packet 20 mEq  potassium chloride 20 mEq in 100 mL IV premix  cosyntropin (Cortrosyn) injection 250 mcg      Relevant Results  Labs  Results from last 72 hours   Lab Units 12/20/23  0512 12/19/23  0537 12/18/23  0508   WBC AUTO x10*3/uL 12.4* 11.8* 12.7*   HEMOGLOBIN g/dL 10.0* 9.9* 10.7*   HEMATOCRIT % 29.2* 30.3* 31.9*   PLATELETS AUTO x10*3/uL 160 114* 116*   NEUTROS PCT AUTO % 58.0 60.9  --    LYMPHS PCT AUTO % 25.4 23.3  --    MONOS PCT AUTO % 8.8 9.7  --    EOS PCT AUTO % 4.8 3.5  --      Results from last 72 hours   Lab Units 12/20/23  0512 12/19/23  0537 12/18/23  0747   SODIUM mmol/L 136 134 135   POTASSIUM mmol/L 3.0* 3.1* 3.3*   CHLORIDE mmol/L 98 97 99   CO2 mmol/L 25 24 23*   BUN mg/dL 17 10 25   CREATININE mg/dL 3.30* 2.20* 3.60*   GLUCOSE mg/dL 171* 126* 140*   CALCIUM mg/dL 8.6 8.5 9.0   ANION GAP mmol/L 13 13 13   EGFR mL/min/1.73m*2 14* 23* 13*   PHOSPHORUS mg/dL 1.2* 0.5* 0.9*     Results from last 72 hours   Lab Units 12/20/23  0512 12/19/23  0537 12/18/23  0747   ALBUMIN g/dL 2.9* 3.0* 3.1*     Estimated Creatinine Clearance: 14.4 mL/min (A) (by C-G formula based on SCr of 3.3 mg/dL (H)).  CRP   Date Value Ref Range Status   06/23/2023 19.9 (H) 0 - 2.0 MG/DL Final     Comment:     Performed [facility]  [place] [address] [address] [facility] [address]   05/22/2022 1.0 0 - 2.0 MG/DL Final     Comment:     [facility] [place] [address] [facility] [address]   12/13/2020 10.1 (H) 0 - 2.0 MG/DL Final     Comment:     Performed at [address] [address]     Microbiology    Imaging  XR chest 1 view    Result Date: 12/19/2023  Interpreted By:  Judd Lee, STUDY: XR CHEST 1 VIEW; 12/19/2023 5:44 am   INDICATION: Signs/Symptoms:pleural effusion.   COMPARISON: December 14, 2023   ACCESSION NUMBER(S): [id number]   ORDERING CLINICIAN: PARIS LUCAS   FINDINGS: RESULT: Right sided central venous catheter is again noted extending into the right atrium. The distal tip is not well visualized. Left-sided vascular stents are noted in the left subclavian region. Left axillary surgical clips are noted. The cardiac silhouette is within normal limits for size. Mediastinal contours are unremarkable. Left basilar opacity obscures the left hemidiaphragm with hazy opacity throughout the left lung. There are degenerative changes of the thoracic spine and shoulders.       Persistent left basilar opacity obscures the left hemidiaphragm with hazy opacity extending through the left hemithorax. Findings likely represent pleural effusion atelectasis. Underlying infiltrate or mass is not excluded. Overall findings not significantly changed compared to previous exam     Signed by: Judd Lee 12/19/2023 9:02 AM Dictation workstation:   [id number]    XR chest 1 view    Result Date: 12/14/2023  Interpreted By:  Judd Lee, STUDY: XR CHEST 1 VIEW; 12/14/2023 10:43 am   INDICATION: Hypoxic respiratory failure.   COMPARISON: December 8, 2023   ACCESSION NUMBER(S): [id number]   ORDERING CLINICIAN: RODRI SURESH   FINDINGS: RESULT: Right-sided double-lumen dialysis catheter is noted with distal tip at the level of the right atrium. Vascular stents are noted in the left subclavian region. Surgical clips  are noted in the left axilla. Cardiac silhouette size is indeterminate as the left heart border is obscured. There are calcifications involving the thoracic aorta. Left basilar obscurity obscures the left hemidiaphragm and left heart border with hazy opacity extending to the left mid lung. There are degenerative changes of the bilateral shoulders.       Increasing density in the left mid and lower lung suggests increasing infiltrate or pleural effusion.     Signed by: Judd Lee 12/14/2023 10:58 AM Dictation workstation:   [id number]    Vascular US ankle brachial index (OCTAVIO) without exercise    Result Date: 12/13/2023           [place] [address]            Phone [phone]  Vascular Lab Report  [facility] US ANKLE BRACHIAL INDEX (OCTAVIO) WITHOUT EXERCISE Patient Name:      BASIA Gray Physician: [id number] Lizet Messer MD Study Date:        12/11/2023          Ordering Provider: [id number] MARIANN SUTTON MRN/PID:           [id number]            Fellow: Accession#:        [id number]        Technologist:      Luz Maria Trivedi RVT Date of Birth/Age: 1950 / 73 years Technologist 2:    Dara Parr RVT Gender:            F                   Encounter#:        [id number] Admission Status:  Inpatient           Location           [facility]                                        Performed:  Diagnosis/ICD: Other specified symptoms and signs involving the circulatory and                respiratory systems-R09.89 Indication:    Peripheral vascular disease CPT Codes:     37303.52 Peripheral artery OCTAVIO Only Reduced Service  Pertinent History: Absent pedal pulses. Patient is on dialysis.  CONCLUSIONS: Right Lower PVR: Evidence of moderate arterial occlusive disease in the right lower extremity at rest. Right pressures of >220 mmHg suggest no compressibility of vessels and may make absolute Segmental Limb  Pressures (SLP) unreliable. Decreased digital perfusion noted. Biphasic flow is noted in the right dorsalis pedis artery. Left Lower PVR: Evidence of moderate arterial occlusive disease in the left lower extremity at rest. Left pressures of >220 mmHg suggest no compressibility of vessels and may make absolute Segmental Limb Pressures (SLP) unreliable. Decreased digital perfusion noted. Biphasic flow is noted in the left dorsalis pedis artery. Additional Findings: Technically difficult and limited exam due to patient's positioning, movement and inability to cooperate with exam.  Imaging & Doppler Findings:  RIGHT Lower PVR              Pressures Right Dorsalis Pedis (Ankle) 255 mmHg   LEFT Lower PVR              Pressures Left Dorsalis Pedis (Ankle) 255 mmHg   [id number] Lizet Messer MD Electronically signed by [id number] Lizet Messer MD on 12/13/2023 at 8:38:56 AM  ** Final **     CT head wo IV contrast    Result Date: 12/11/2023  Interpreted By:  Brendon Hook, STUDY: CT HEAD WO IV CONTRAST; 12/11/2023 5:20 pm   INDICATION: Signs/Symptoms:acute confusion.   COMPARISON: 20 August 2023   ACCESSION NUMBER(S): [id number]   ORDERING CLINICIAN: RODRI SURESH   TECHNIQUE: CT was performed with one or more of the following dose reduction techniques: automated exposure control, adjustment of the mA and/or kV according to patient size, or use of iterative reconstruction technique.       PROCEDURE: 3.0 mm axial images were obtained through the brain, to include the posterior fossa without intravenous contrast enhancement.   FINDINGS: The patient's head is turned to the left and slightly canted in the gantry. Mild motion artifact is seen. There is symmetric volume loss of the cerebral hemispheres. Moderate  decreased attenuation in the bilateral periventricular white matter, consistent with microangiopathy is noted.  There is no encephalomalacic change. Brainstem is unremarkable. Cerebellar hemispheres are symmetric. No  subarachnoid, intraparenchymal, subdural or interventricular hemorrhage. No intra- or extra-axial mass or abnormal blood products are demonstrated. Hyperostosis frontalis interna is seen. Slight hypoplasia of the left mastoid.   The paranasal sinuses and mastoids as well as calvarium are unremarkable.       1. No acute intracranial findings. 2. Symmetric volume loss of the cerebral hemispheres. 3. Periventricular Microangiopathy. 4. No posttraumatic abnormality.   This report has been produced using speech recognition. This exam is available in DICOM format to non-affiliated healthcare facilities on a secure media free searchable basis with prior patient authorization. The patient exposure is reported to a radiation dose index registry. All CT examinations are performed with one or more of the following dose reduction techniques: Automated Exposure Control, Adjustment of mA and/or KV according to patient size, or use of iterative reconstruction techniques.   MACRO: None   Signed by: Brendon Hook 12/11/2023 7:05 PM Dictation workstation:   [id number]    US thoracentesis    Result Date: 12/8/2023  Interpreted By:  Sandro Hernandez, STUDY: US THORACENTESIS; 12/7/2023 3:16 pm   INDICATION: Signs/Symptoms:Left pleural effusion question of whether this needs thoracentesis, history of ESRD, just had dialysis today.   COMPARISON: None   ACCESSION NUMBER(S): [id number]   ORDERING CLINICIAN: JEZ LONG   TECHNIQUE: Informed consent obtained. Patient positionedsitting upright. Skin prepped, draped and anesthetized. Under ultrasound guidance, a centesis catheter/needle was advanced into rightpleural cavity.   FINDINGS: A total of 750 cc of clear yellow fluid was aspirated. A sample was sent for analysis. The patient tolerated the procedure well.       Ultrasound-guided left thoracentesis.     Signed by: Sandro Hernandez 12/8/2023 3:01 PM Dictation workstation:   [id number]    XR chest 1 view    Result Date:  12/8/2023  Interpreted By:  Maria Garcia, STUDY: XR CHEST 1 VIEW 12/8/2023 11:57 am   INDICATION: Signs/Symptoms:S/P thoracentesis   COMPARISON: 12/06/2023   ACCESSION NUMBER(S): [id number]   ORDERING CLINICIAN: JEZ LONG   TECHNIQUE: AP erect view of the chest at bedside   FINDINGS: There is an interval decrease in size of left pleural effusion when compared with the study done 2 days earlier. No pneumothorax is seen.   There is some residual left pleural effusion with infiltrates seen in the left mid and lower lung field. The right lung is clear.   There are surgical clips within the left axilla. Stent grafts are visible in the left subclavian region extending into the left axillary region. A tunneled right jugular dialysis catheter terminates within right atrium.       Decreased size of left effusion following ultrasound-guided thoracentesis with no pneumothorax.   There is some residual left pleural effusion noted with infiltrate in the left mid and lower lung field.   Signed by: Maria Garcia 12/8/2023 12:51 PM Dictation workstation:   [id number]    XR foot 3+ views bilateral    Result Date: 12/7/2023  Interpreted By:  Brendon Hook, STUDY: XR FOOT 3+ VIEWS BILATERAL; 12/7/2023 3:31 pm   INDICATION: Signs/Symptoms:Wound/Abscess/Acute neha process rule out/Comparative Exam.   COMPARISON: None   ACCESSION NUMBER(S): [id number]   ORDERING CLINICIAN: JOSSELYN STAHL   TECHNIQUE: Nonweightbearing AP, lateral and oblique views of the bilateral foot were performed.   FINDINGS: Right foot: Large heel spur is seen. Mild Achilles enthesopathy demonstrated. Moderate atherosclerotic disease is seen. There is suggestion of loss of the arch on this nonweightbearing view. Degenerative change anterior plafond demonstrated and dorsal talus as well as posterior plafond and dorsal talus. Sclerosis anterior calcaneus is seen. Degenerative changes mild between the midfoot and forefoot. 1st metatarsophalangeal joint  degenerative changes demonstrated. 2nd ray absent phalanges. Distal phalanges of the 3rd through 5th rays with overlap were difficult to visualize. Distal phalanx of the great toe is difficult to visualize.   Left foot: Large heel spur is seen. Advanced atherosclerotic disease demonstrated. Degenerative change in the calcaneus and talus seen with moderate anterior and mild posterior plafond degenerative change. Mild degenerative change in the midfoot and forefoot is seen. Distal phalanx of the great toe is absent. Degenerative change of the 2nd ray phalanges and 3rd ray phalanges seen with degenerative change of the 4th and 5th digits not well seen.       1. Right foot: Large heel spur. Atherosclerotic disease. Suggestion of diffuse osteopenia. No sclerosis to suggest osteomyelitis. No subcutaneous air to suggest gangrene. Absent flanges 2nd ray with difficult to visualized distal phalanges of the other digits.   2. Left foot: Large heel spur. Degenerative changes of the hindfoot midfoot and forefoot with atherosclerotic disease. Absent distal phalanx of the great toe. No periostitis or abnormal sclerosis with diffuse osteopenia to suggest osteomyelitis. No subcutaneous air to suggest gangrene.       Signed by: Brendon Hook 12/7/2023 8:39 PM Dictation workstation:   [id number]    ECG 12 Lead    Result Date: 12/7/2023   Poor data quality, interpretation may be adversely affected Normal sinus rhythm Right bundle branch block Septal infarct , age undetermined Possible Lateral infarct , age undetermined Abnormal ECG No previous ECGs available Confirmed by Hammad Callahan ([id number]) on 12/7/2023 1:07:11 PM    XR chest 1 view    Result Date: 12/6/2023  Interpreted By:  Roger Murdock, STUDY: XR CHEST 1 VIEW;  12/6/2023 2:08 pm   INDICATION: Signs/Symptoms:Brief AMS and hypotension, now resolved.   COMPARISON: 08/21/2023..   ACCESSION NUMBER(S): [id number]   ORDERING CLINICIAN: PATEL TINSLEY   FINDINGS: New moderate left pleural  effusion. There is silhouetting of the left heart border. Left subclavian stent present. Right-sided double-lumen central venous catheter terminates within the right ventricle. No acute osseous findings.       New, moderate left pleural effusion.     MACRO: None   Signed by: Roger Murdock 12/6/2023 2:18 PM Dictation workstation:   [id number]     Assessment/Plan   Shock-etiology unclear  Unstageable Bilateral heel ulcers  C-difficile infection-resolving  Type 2 diabetes mellitus with peripheral angiopathy with gangrene   acute respiratory failure-resolved  Coronavirus infection  Pleural effusion, s/p thoracentesis  History of chronic MRSA bacteremia, infective endocarditis-completed IV vancomycin, now on Doxycycline   Hypotension-resolved  ESRD on hemodialysis  Severe malnutrition-prealbumin of 3.2           Continue Dificid  Oral doxycycline as suppressive therapy for MRSA bacteremia with possible infective endocarditis   discontinue Zosyn  Monitor temperature WBC  Local care  Offloading  High-protein diet  Monitor stool  Podiatry follow up  Wean off pressors   droplet plus precautions  Contact plus precautions      Stephen Coulter MD

## 2023-12-20 NOTE — PROGRESS NOTES
Ayanna Gross is a 73 y.o. female on day 14 of admission presenting with Hypotension, unspecified hypotension type.      Subjective     No new complaints.  No worsening shortness of breath.  Slept well. Nursing reporting there was some possible aspiration, now NPO.            Objective     Last Recorded Vitals  BP (!) 69/30   Pulse 87   Temp 36.5 °C (97.7 °F) (Axillary)   Resp 17   Wt 70.5 kg (155 lb 6.8 oz)   SpO2 94%   Intake/Output last 3 Shifts:    Intake/Output Summary (Last 24 hours) at 12/20/2023 0737  Last data filed at 12/19/2023 1715  Gross per 24 hour   Intake 571.7 ml   Output 900 ml   Net -328.3 ml         Admission Weight  Weight: 72.6 kg (160 lb) (12/06/23 1301)    Daily Weight  12/20/23 : 70.5 kg (155 lb 6.8 oz)    Image Results  XR chest 1 view  Narrative: Interpreted By:  Judd Lee,   STUDY:  XR CHEST 1 VIEW; 12/19/2023 5:44 am      INDICATION:  Signs/Symptoms:pleural effusion.      COMPARISON:  December 14, 2023      ACCESSION NUMBER(S):  [id number]      ORDERING CLINICIAN:  PARIS LUCAS      FINDINGS:  RESULT: Right sided central venous catheter is again noted extending  into the right atrium. The distal tip is not well visualized.  Left-sided vascular stents are noted in the left subclavian region.  Left axillary surgical clips are noted. The cardiac silhouette is  within normal limits for size. Mediastinal contours are unremarkable.  Left basilar opacity obscures the left hemidiaphragm with hazy  opacity throughout the left lung. There are degenerative changes of  the thoracic spine and shoulders.      Impression: Persistent left basilar opacity obscures the left hemidiaphragm with  hazy opacity extending through the left hemithorax. Findings likely  represent pleural effusion atelectasis. Underlying infiltrate or mass  is not excluded. Overall findings not significantly changed compared  to previous exam          Signed by: Judd Lee 12/19/2023 9:02 AM  Dictation  workstation:   [id number]      Physical Exam  Constitutional:       Appearance: Normal appearance. She is not toxic-appearing.   HENT:      Head: Normocephalic.      Right Ear: External ear normal.      Left Ear: External ear normal.   Eyes:      Conjunctiva/sclera: Conjunctivae normal.   Cardiovascular:      Rate and Rhythm: Normal rate.      Heart sounds: Normal heart sounds.   Pulmonary:      Effort: Pulmonary effort is normal.      Breath sounds: Normal breath sounds.   Abdominal:      General: Abdomen is flat.      Palpations: Abdomen is soft.   Skin:     General: Skin is warm.   Neurological:      Mental Status: She is oriented to person, place, and time.   Psychiatric:         Mood and Affect: Mood normal.       General: alert, no diaphoresis   Lungs: CTA BL   Heart: RRR, no LE edema BL   GI: abdomen soft, nontender, nondistended, BS present   MSK: no joint effusion or deformity   Skin: no rashes, erythema, or ecchymosis   Neuro: grossly normal cognition, motor strength, sensation      Relevant Results               Scheduled medications  apixaban, 2.5 mg, oral, BID  calcium acetate, 667 mg, oral, TID with meals  collagenase, , Topical, Daily  doxycycline, 100 mg, oral, Daily  epoetin di or biosimilar, 10,000 Units, intravenous, Every Mon/Wed/Fri  escitalopram, 10 mg, oral, Daily  febuxostat, 40 mg, oral, Every other day  fenofibrate, 160 mg, oral, Daily  fidaxomicin, 200 mg, oral, BID  gabapentin, 100 mg, oral, BID  heparin, 2,000 Units, intra-catheter, After Dialysis  heparin, 2,000 Units, intra-catheter, After Dialysis  heparin, 2,000 Units, intra-catheter, After Dialysis  heparin, 2,000 Units, intra-catheter, After Dialysis  insulin lispro, 0-5 Units, subcutaneous, TID with meals  ipratropium-albuteroL, 3 mL, nebulization, TID  metoprolol succinate XL, 12.5 mg, oral, Daily  midodrine, 15 mg, oral, TID with meals  nystatin, 1 Application, Topical, BID  piperacillin-tazobactam, 2.25 g, intravenous,  q6h  potassium chloride, 20 mEq, oral, Once  simvastatin, 40 mg, oral, Nightly      Continuous medications  norepinephrine, 0.01-3 mcg/kg/min, Last Rate: 0.03 mcg/kg/min (12/20/23 0047)      PRN medications  PRN medications: acetaminophen, acetaminophen, dextrose 10 % in water (D10W), dextrose, glucagon, HYDROcodone-acetaminophen, oxygen, polyethylene glycol, zinc oxide    Results for orders placed or performed during the hospital encounter of 12/06/23 (from the past 24 hour(s))   POCT GLUCOSE   Result Value Ref Range    POCT Glucose 109 (H) 74 - 99 mg/dL   POCT GLUCOSE   Result Value Ref Range    POCT Glucose 124 (H) 74 - 99 mg/dL   POCT GLUCOSE   Result Value Ref Range    POCT Glucose 111 (H) 74 - 99 mg/dL   POCT GLUCOSE   Result Value Ref Range    POCT Glucose 149 (H) 74 - 99 mg/dL   POCT GLUCOSE   Result Value Ref Range    POCT Glucose 172 (H) 74 - 99 mg/dL   Renal Function Panel   Result Value Ref Range    Glucose 171 (H) 65 - 99 mg/dL    Sodium 136 133 - 145 mmol/L    Potassium 3.0 (L) 3.4 - 5.1 mmol/L    Chloride 98 97 - 107 mmol/L    Bicarbonate 25 24 - 31 mmol/L    Urea Nitrogen 17 8 - 25 mg/dL    Creatinine 3.30 (H) 0.40 - 1.60 mg/dL    eGFR 14 (L) >60 mL/min/1.73m*2    Calcium 8.6 8.5 - 10.4 mg/dL    Phosphorus 1.2 (L) 2.5 - 4.5 mg/dL    Albumin 2.9 (L) 3.5 - 5.0 g/dL    Anion Gap 13 <=19 mmol/L   CBC and Auto Differential   Result Value Ref Range    WBC 12.4 (H) 4.4 - 11.3 x10*3/uL    nRBC 0.2 (H) 0.0 - 0.0 /100 WBCs    RBC 3.51 (L) 4.00 - 5.20 x10*6/uL    Hemoglobin 10.0 (L) 12.0 - 16.0 g/dL    Hematocrit 29.2 (L) 36.0 - 46.0 %    MCV 83 80 - 100 fL    MCH 28.5 26.0 - 34.0 pg    MCHC 34.2 32.0 - 36.0 g/dL    RDW 22.5 (H) 11.5 - 14.5 %    Platelets 160 150 - 450 x10*3/uL    Neutrophils % 58.0 40.0 - 80.0 %    Immature Granulocytes %, Automated 1.9 (H) 0.0 - 0.9 %    Lymphocytes % 25.4 13.0 - 44.0 %    Monocytes % 8.8 2.0 - 10.0 %    Eosinophils % 4.8 0.0 - 6.0 %    Basophils % 1.1 0.0 - 2.0 %     Neutrophils Absolute 7.21 (H) 1.60 - 5.50 x10*3/uL    Immature Granulocytes Absolute, Automated 0.23 0.00 - 0.50 x10*3/uL    Lymphocytes Absolute 3.16 (H) 0.80 - 3.00 x10*3/uL    Monocytes Absolute 1.09 (H) 0.05 - 0.80 x10*3/uL    Eosinophils Absolute 0.60 (H) 0.00 - 0.40 x10*3/uL    Basophils Absolute 0.14 (H) 0.00 - 0.10 x10*3/uL   Morphology   Result Value Ref Range    RBC Morphology See Below     Target Cells Few         Assessment/Plan        This patient has a central line   Reason for the central line remaining today? Parenteral medication            Principal Problem:    Hypotension, unspecified hypotension type  Active Problems:    Paroxysmal atrial fibrillation (CMS/[facility])    Peripheral vascular disease (CMS/[facility])    Orthostatic hypotension    Mixed hyperlipidemia    End-stage renal disease on hemodialysis (CMS/[facility])    DM (diabetes mellitus) (CMS/[facility])    Absent pedal pulses    Shock  -  suspect septic overall but could be element of hypovolemia/hypoalbuminemia given diarrhea from c diff and poor nutritional status. She also has an underlying chronic hypotension  - on levophed, titrating for goal systolic in the 70s. Reamains on Levophed  - Empiric Zosyn per ID    A fibrillation  Continue low-dose beta blocker   Eliquis for stroke prophylaxis     Dysphagia  - nursing reporting aspiration last night and pocketing pills  - SLP eval.      Mixed hyperlipidemia  Continue statin, fenofibrate      DM (diabetes mellitus)   Hold Tradjenta   AC/HS glucose with SSI coverage   Hypoglycemia protocol      Groin excoriation   Wound care consult   Nystatin powder   Keep skin clean and dry   Sacrum covered with Mepilex.      C-diff   PCR positive and negative toxin.   Now on fidaxomicin    ESRD  HD per renal.         Malnutrition Diagnosis Status: Ongoing  Malnutrition Diagnosis: Moderate malnutrition related to acute disease or injury  As Evidenced by: 1-2% unintentional wt loss in 1 week (~4% in 2 weeks), Poor intake <75% of  estimated energy requirement > 7days, and mild fat and muscle deficits  I agree with the dietitian's malnutrition diagnosis.       Needs continued ICU care for vasopressors and arterial line monitoring.    SLP            Daniel Rubio, DO

## 2023-12-20 NOTE — PROGRESS NOTES
Wound Care Progress Note     Visit Date: 12/20/2023      Patient Name: Ayanna Gross         MRN: [id number]                Reason for Visit: Followup        Wound History: Present on admission. Bilateral heel ulcers being followed and managed by Podiatry. Large yeast rash to Bilateral groin, periarea, Bilateral buttock and upper thighs. Patient +C-diff and having active diarrhea. Patient was being treated by [facility] for Diabetic heel ulcers.       A 73 y.o. year old female admitted for Principal Problem:    Hypotension, unspecified hypotension type  Active Problems:    Paroxysmal atrial fibrillation (CMS/[facility])    Peripheral vascular disease (CMS/[facility])    Orthostatic hypotension    Mixed hyperlipidemia    End-stage renal disease on hemodialysis (CMS/[facility])    DM (diabetes mellitus) (CMS/[facility])    Absent pedal pulses      Past Medical History:   Diagnosis Date    Asthma     CAD (coronary artery disease)     CHF (congestive heart failure) (CMS/[facility])     Chronic kidney disease on chronic dialysis (CMS/[facility])     Hypertension     Personal history of diseases of the blood and blood-forming organs and certain disorders involving the immune mechanism     History of autoimmune disorder    Sleep apnea     Type 2 diabetes mellitus without complications (CMS/[facility]) 09/15/2022    Diabetes mellitus      Past Surgical History:   Procedure Laterality Date    CARPAL TUNNEL RELEASE  11/18/2013    Neuroplasty Decompression Median Nerve At Carpal Tunnel    HAND SURGERY  11/18/2013    Hand Surgery                                                                                                                                                          MASTECTOMY, PARTIAL  04/10/2014    Right Breast Partial Mastectomy    MR HEAD ANGIO WO IV CONTRAST  8/29/2023    MR HEAD ANGIO WO IV CONTRAST [facility]    OTHER SURGICAL HISTORY  11/18/2013    Breast Reconstruction With Implant Prosthesis    OTHER SURGICAL HISTORY  11/18/2013     Thyroid Surgery Substernal Thyroidectomy Partial    OTHER SURGICAL HISTORY  11/18/2013    Modified Radical Mastectomy Left Breast    OTHER SURGICAL HISTORY  04/04/2022    Carpal tunnel surgery    OTHER SURGICAL HISTORY  04/04/2022    Foot surgery    OTHER SURGICAL HISTORY  04/04/2022    Hernia repair    SENTINEL LYMPH NODE BIOPSY  04/10/2014    [place] Lymph Node Biopsy    TONSILLECTOMY  11/18/2013    Tonsillectomy    UMBILICAL HERNIA REPAIR  11/18/2013    Umbilical Hernia Repair    US GUIDED PERCUTANEOUS PLACEMENT  6/29/2023    US GUIDED PERCUTANEOUS PLACEMENT [facility]       Scheduled medications  apixaban, 2.5 mg, oral, BID  calcium acetate, 667 mg, oral, TID with meals  collagenase, , Topical, Daily  doxycycline, 100 mg, oral, Daily  epoetin di or biosimilar, 10,000 Units, intravenous, Every Mon/Wed/Fri  escitalopram, 10 mg, oral, Daily  febuxostat, 40 mg, oral, Every other day  fenofibrate, 160 mg, oral, Daily  fidaxomicin, 200 mg, oral, BID  gabapentin, 100 mg, oral, BID  heparin, 2,000 Units, intra-catheter, After Dialysis  heparin, 2,000 Units, intra-catheter, After Dialysis  insulin lispro, 0-5 Units, subcutaneous, TID with meals  ipratropium-albuteroL, 3 mL, nebulization, TID  metoprolol succinate XL, 12.5 mg, oral, Daily  midodrine, 15 mg, oral, TID with meals  nystatin, 1 Application, Topical, BID  piperacillin-tazobactam, 2.25 g, intravenous, q6h  simvastatin, 40 mg, oral, Nightly      Continuous medications  norepinephrine, 0.01-3 mcg/kg/min, Last Rate: 0.04 mcg/kg/min (12/20/23 1335)      PRN medications  PRN medications: acetaminophen, acetaminophen, dextrose 10 % in water (D10W), dextrose, glucagon, HYDROcodone-acetaminophen, oxygen, polyethylene glycol, zinc oxide    No Known Allergies      Pertinent Labs:   [place], Urine   Date Value Ref Range Status   02/24/2019 1,315 (H) 0 - 23 MG/L Final     Comment:     RESULT CHECKED     Albumin   Date Value Ref Range Status   12/20/2023 2.9  (L) 3.5 - 5.0 g/dL Final     Albumin, SPE   Date Value Ref Range Status   02/26/2019 3.22  Reference range: 3.37 to 4.23  Unit: gm/dL   (L)  Final     Albumin/Protein Total, Ur   Date Value Ref Range Status   02/24/2019 77.5  Unit: %    Final           Wound Assessment:  Wound 12/06/23 Buttocks Bilateral (Active)   Date First Assessed/Time First Assessed: 12/06/23 2200   Present on Original Admission: Yes  Hand Hygiene Completed: Yes  Location: Buttocks  Wound Location Orientation: Bilateral      Assessments 12/20/2023 11:32 AM   Wound Image     Site Assessment Excoriated;Non-blanchable erythema   Judy-Wound Assessment Excoriated;Blanchable erythema   Pressure Injury Stage Stage 2   Drainage Description Serosanguineous   Drainage Amount Scant   Dressing Gauze;Foam;Silicone border dressing   Dressing Changed Changed   Dressing Status Clean;Dry       Active Orders   Date Order Priority Status Authorizing Provider   12/14/23 1011 Change dressing Routine Active MADHURI Carrasquillo-CNP   12/08/23 1605 Change dressing Routine Active MADHURI Carrasquillo-GRACE       Wound 12/06/23 Other (comment) Groin Bilateral (Active)   Date First Assessed/Time First Assessed: 12/06/23 2200   Present on Original Admission: Yes  Primary Wound Type: (c) Other (comment)  Location: (c) Groin  Wound Location Orientation: Bilateral      Assessments 12/20/2023 11:40 AM   Site Assessment Red;Pink   Judy-Wound Assessment Clean;Dry;Intact       Active Orders   Date Order Priority Status Authorizing Provider   12/08/23 1605 Change dressing Routine Active MADHURI Carrasquillo-GRACE       Wound 12/20/23 Other (comment) Hip Right (Active)   Date First Assessed/Time First Assessed: 12/20/23 1140   Present on Original Admission: No  Hand Hygiene Completed: Yes  Primary Wound Type: (c) Other (comment)  Location: Hip  Wound Location Orientation: Right      Assessments 12/20/2023 11:40 AM   Wound Image     Site Assessment Denuded   Judy-Wound  Assessment Clean;Dry;Intact   Margins Undefined edges   Drainage Description Serous   Drainage Amount Scant   Dressing Foam;Silicone border dressing   Dressing Changed Reinforced   Dressing Status Clean;Dry       No associated orders.       Wound 12/20/23 Skin Tear Hand Dorsal;Left (Active)   Date First Assessed/Time First Assessed: 12/20/23 1140   Present on Original Admission: No  Hand Hygiene Completed: Yes  Primary Wound Type: Skin Tear  Location: Hand  Wound Location Orientation: Dorsal;Left      Assessments 12/20/2023 11:40 AM   Wound Image     Drainage Description None   Drainage Amount None   Dressing Open to air       No associated orders.         Rectal Tube With balloon (Active)   Placement Date/Time: 12/19/23 1300   Type: (c) With balloon   Number of days: 1     Rectal Tube With balloon (Active)         Wound 12/06/23 Diabetic Ulcer Heel Left (Active)   Date First Assessed/Time First Assessed: 12/06/23 2200   Present on Original Admission: Yes  Hand Hygiene Completed: Yes  Primary Wound Type: Diabetic Ulcer  Location: Heel  Wound Location Orientation: Left   Number of days: 13       Wound 12/06/23 Diabetic Ulcer Heel Right (Active)   Date First Assessed/Time First Assessed: 12/06/23 2200   Present on Original Admission: Yes  Hand Hygiene Completed: Yes  Primary Wound Type: Diabetic Ulcer  Location: Heel  Wound Location Orientation: Right   Number of days: 13       Wound 12/06/23 Buttocks Bilateral (Active)   Date First Assessed/Time First Assessed: 12/06/23 2200   Present on Original Admission: Yes  Hand Hygiene Completed: Yes  Location: Buttocks  Wound Location Orientation: Bilateral   Number of days: 13       Wound 12/06/23 Other (comment) Groin Bilateral (Active)   Date First Assessed/Time First Assessed: 12/06/23 2200   Present on Original Admission: Yes  Primary Wound Type: (c) Other (comment)  Location: (c) Groin  Wound Location Orientation: Bilateral   Number of days: 13       Wound 12/13/23 Knee  Dorsal;Left (Active)   Date First Assessed/Time First Assessed: 12/13/23 1410   Location: Knee  Wound Location Orientation: Dorsal;Left   Number of days: 6       Wound 12/13/23 Leg Distal;Dorsal;Right;Upper (Active)   Date First Assessed/Time First Assessed: 12/13/23 1410   Location: Leg  Wound Location Orientation: Distal;Dorsal;Right;Upper   Number of days: 6       Wound 12/13/23 Head Dorsal;Left (Active)   Date First Assessed/Time First Assessed: 12/13/23 1418   Location: Head  Wound Location Orientation: Dorsal;Left   Number of days: 6     Wound 12/06/23 Diabetic Ulcer Heel Left (Active)   Wound Image   12/17/23 0000   Site Assessment Unable to assess 12/19/23 2100   Judy-Wound Assessment Painful;Red 12/18/23 0400   State of Healing Non-healing 12/12/23 1500   Treatments Other (Comment) 12/11/23 2130   Drainage Description Sanguineous 12/18/23 0400   Drainage Amount Small 12/18/23 0400   Dressing Kerlix/rolled gauze 12/18/23 0830   Dressing Changed Changed 12/18/23 0000   Dressing Status Clean;Dry;Occlusive 12/20/23 0849   Dressing Status Clean;Dry;Intact 12/20/23 0849       Wound 12/06/23 Diabetic Ulcer Heel Right (Active)   Wound Image   12/16/23 2349   Site Assessment Unable to assess 12/19/23 2100   Judy-Wound Assessment Red 12/18/23 0400   Drainage Description None 12/18/23 0400   Drainage Amount None 12/18/23 0400   Dressing Kerlix/rolled gauze 12/18/23 0830   Dressing Changed Changed 12/18/23 0000   Dressing Status Clean;Dry;Occlusive 12/20/23 0849   Dressing Status Clean;Dry;Intact 12/20/23 0849       Wound 12/06/23 Buttocks Bilateral (Active)   Wound Image   12/20/23 1132   Site Assessment Excoriated 12/20/23 0849   Judy-Wound Assessment Red 12/20/23 0849   Drainage Description Serosanguineous 12/19/23 2100   Drainage Amount Scant 12/19/23 2100   Dressing Foam 12/19/23 2100   Dressing Changed Changed 12/18/23 0400   Dressing Status Clean;Dry 12/20/23 0849   Dressing Status Clean;Dry;Intact 12/20/23 0849        Wound 12/06/23 Other (comment) Groin Bilateral (Active)   Wound Image   12/16/23 1935   Site Assessment Excoriated 12/20/23 0849   Judy-Wound Assessment Red 12/20/23 0849   Drainage Description None 12/20/23 0849   Drainage Amount None 12/20/23 0849   Dressing Open to air 12/20/23 0849   Dressing Changed Other (Comment) 12/19/23 2100   Dressing Status Other (Comment) 12/11/23 2130       Wound 12/13/23 Knee Dorsal;Left (Active)   Wound Image   12/16/23 1941       Wound 12/13/23 Leg Distal;Dorsal;Right;Upper (Active)   Wound Image   12/13/23 1410       Wound 12/13/23 Head Dorsal;Left (Active)   Wound Image   12/16/23 1941         Exam conducted on day 14 of stay with knowledge of Floor Nurse. Patient in isolation for COVID and C-diff. Introductions made to patient, sleepy, easily arousable. On exam patient lying in bed, on diaylsis. With assist of Nurse skin assessed. Amos catheter, Fecal matter system (FMS) and TruVue boots in place. Dressings intact to Bilateral heels, managed by Podiatry. Redness to groin and inner thighs vastly improved. Patient turned to left side. Sacral border in place, removed. Patient has combination of MASD and Stage 2 pressure injury to lower sacrum, coccyx, and buttock. Improving since placement of FMS. Patient has a right hip border dressing, peeled back. Abrasion to right hip. No other skin issues or concerns. Patient is on a Progressa bedsystem with Waffle mattress overlay. Nan Kern RN updated, to continue pressure injury prevention interventions, and nursing to continue to follow providers orders. Reconsult Wound RN PRN. Message to Podiatry regarding followup. Diamond WAYNEN RN       Wound Team Plan: Bilateral Heels- Apply nickel thick amount of santyl to posterior heel bilateral. Cover with adaptic, 4x4, abd, kerlix. Do not overtighten kerlix at ankle. Place both feet back in pillow boots. Buttock- cleanse with soap and water, pat dry. Apply Medihoney to open areas,  Mixture of Nystatin powder and Calazime to surrounding areas and cover with DCD daily and prn. Bilateral groin- cleanse with soap and water, pat dry, apply mixture of Nystatin powder and Calazime cream. Right hip- Cleanse with saline, apply Xeroform, cover with border dressing daily and prn.         Diamond Argueta RN  12/20/2023  1:51 PM

## 2023-12-20 NOTE — PROGRESS NOTES
Speech-Language Pathology                 Therapy Communication Note    Patient Name: Ayanna Gross  MRN: [id number]  Today's Date: 12/20/2023     Discipline: Speech Language Pathology    Missed Visit Reason:  pt currently receiving dialysis    Missed Time: Attempt    Comment:

## 2023-12-20 NOTE — PROGRESS NOTES
Hemodialysis procedure note:  I saw her during her hemodialysis procedure.  She was tolerating the procedure without remark.  She has no cough today.  She says her diarrhea is better.  She has no abdominal pain.  Seems to be thinking clearly.  Her blood pressure remains low and she remains on norepinephrine infusion even with the changed parameter that we need to keep the blood pressure greater than 70 systolic.  We are trying to remove 1 L of fluid with dialysis today.  Using a low-temperature dialysate.  Anticipate next hemodialysis would be needed on Friday.  Continue supportive care otherwise.

## 2023-12-20 NOTE — CARE PLAN
The patient's goals for the shift include rest    The clinical goals for the shift include wean pressors as tolerated    Over the shift, the patient did not make progress toward the following goals wean pressors. Barriers to progression include hypotension. Recommendations to address these barriers include n/a.

## 2023-12-21 NOTE — PROGRESS NOTES
FOLLOWUP FOR: Critical care management    SUBJECTIVE  Remains on 3 L nasal cannula.  Denies any shortness of breath, cough.  Denies pain.  Remains on Levophed at 0.01, receiving oral midodrine.  Currently on dialysis    PHYSICAL EXAM        12/20/2023     6:00 AM 12/20/2023     7:00 AM 12/20/2023     7:15 AM 12/20/2023     7:30 AM 12/20/2023     7:45 AM 12/20/2023     8:00 AM 12/20/2023     8:04 AM   Vitals   Heart Rate 83 83 86 88 88 87    Temp       36.8 °C (98.2 °F)   Resp 14 15 14 15 13 6        Vital signs reviewed   NAD, awake and alert, O2   Lungs Symmetric excursions.  Auscultation - diminished but clear, no wheezing/rales/rhonchi.     Cor RSR No murmur, rub, gallop   Abd soft and nontender, no rebound or distention, no HS megaly   Ext no CCE   Neuro no focal deficits.  moves all extremities symmetrically.  speech normal.  affect normal    LABS    Serum electrolytes personally reviewed, stable creatinine at 3.0 and are otherwise unremarkable.  Cosyntropin stimulation test appears normal with a doubling of the cortisol.  CBC is stable    ASSESSMENT:    .  Septic shock  .  COVID-19 infection  .  C. difficile colitis  .  End-stage renal disease  .  Possible endocarditis  .  Transudative pleural effusion    PLAN    .  Continue pressors, continue to be weaned.  .  No evidence for adrenal insufficiency.  Dialysis  .  Oral vancomycin  .  Systemic antibiotics, infectious disease following  .  Bronchodilators and pulmonary hygiene    Moris Scott MD, [facility]

## 2023-12-21 NOTE — CARE PLAN
Problem: Diabetes  Goal: Achieve decreasing blood glucose levels by end of shift  Outcome: Progressing  Goal: Increase stability of blood glucose readings by end of shift  Outcome: Progressing  Goal: Maintain electrolyte levels within acceptable range throughout shift  Outcome: Progressing  Goal: Maintain glucose levels >70mg/dl to <250mg/dl throughout shift  Outcome: Progressing  Goal: No changes in neurological exam by end of shift  Outcome: Progressing  Goal: Learn about and adhere to nutrition recommendations by end of shift  Outcome: Progressing  Goal: Vital signs within normal range for age by end of shift  Outcome: Progressing  Goal: Increase self care and/or family involovement by end of shift  Outcome: Progressing  Goal: Receive DSME education by end of shift  Outcome: Progressing     Problem: Pain - Adult  Goal: Verbalizes/displays adequate comfort level or baseline comfort level  Outcome: Progressing     Problem: Safety - Adult  Goal: Free from fall injury  Outcome: Progressing     Problem: Discharge Planning  Goal: Discharge to home or other facility with appropriate resources  Outcome: Progressing     Problem: Chronic Conditions and Co-morbidities  Goal: Patient's chronic conditions and co-morbidity symptoms are monitored and maintained or improved  Outcome: Progressing     Problem: Skin  Goal: Decreased wound size/increased tissue granulation at next dressing change  Outcome: Progressing  Goal: Participates in plan/prevention/treatment measures  Outcome: Progressing  Goal: Prevent/manage excess moisture  Outcome: Progressing  Goal: Prevent/minimize sheer/friction injuries  Outcome: Progressing  Goal: Promote/optimize nutrition  Outcome: Progressing  Goal: Promote skin healing  Outcome: Progressing     Problem: Fall/Injury  Goal: Not fall by end of shift  Outcome: Progressing  Goal: Be free from injury by end of the shift  Outcome: Progressing  Goal: Verbalize understanding of personal risk factors  for fall in the hospital  Outcome: Progressing  Goal: Verbalize understanding of risk factor reduction measures to prevent injury from fall in the home  Outcome: Progressing  Goal: Pace activities to prevent fatigue by end of the shift  Outcome: Progressing     Problem: Nutrition  Goal: Oral intake greater than 50%  Outcome: Progressing  Goal: Oral intake greater 75%  Outcome: Progressing  Goal: Consume prescribed supplement  Outcome: Progressing  Goal: Adequate PO fluid intake  Outcome: Progressing  Goal: Nutrition support goals are met within 48 hrs  Outcome: Progressing  Goal: Nutrition support is meeting 75% of nutrient needs  Outcome: Progressing  Goal: Lab values WNL  Outcome: Progressing  Goal: Electrolytes WNL  Outcome: Progressing  Goal: Promote healing  Outcome: Progressing  Goal: Maintain stable weight  Outcome: Progressing  Goal: Reduce weight from edema/fluid  Outcome: Progressing  Goal: Gradual weight gain  Outcome: Progressing     Problem: Respiratory  Goal: Clear secretions with interventions this shift  Outcome: Progressing  Goal: Minimize anxiety/maximize coping throughout shift  Outcome: Progressing  Goal: Minimal/no exertional discomfort or dyspnea this shift  Outcome: Progressing  Goal: No signs of respiratory distress (eg. Use of accessory muscles. Peds grunting)  Outcome: Progressing  Goal: Patent airway maintained this shift  Outcome: Progressing  Goal: Verbalize decreased shortness of breath this shift  Outcome: Progressing  Goal: Wean oxygen to maintain O2 saturation per order/standard this shift  Outcome: Progressing     Problem: Pain  Goal: My pain/discomfort is manageable  Outcome: Progressing     Problem: Safety  Goal: Patient will be injury free during hospitalization  Outcome: Progressing  Goal: I will remain free of falls  Outcome: Progressing     Problem: Daily Care  Goal: Daily care needs are met  Outcome: Progressing     Problem: Psychosocial Needs  Goal: Demonstrates ability to  cope with hospitalization/illness  Outcome: Progressing  Goal: Collaborate with me, my family, and caregiver to identify my specific goals  Outcome: Progressing     Problem: Discharge Barriers  Goal: My discharge needs are met  Outcome: Progressing     Problem: Pain  Goal: Takes deep breaths with improved pain control throughout the shift  Outcome: Progressing  Goal: Turns in bed with improved pain control throughout the shift  Outcome: Progressing  Goal: Walks with improved pain control throughout the shift  Outcome: Progressing  Goal: Performs ADL's with improved pain control throughout shift  Outcome: Progressing  Goal: Participates in PT with improved pain control throughout the shift  Outcome: Progressing  Goal: Free from opioid side effects throughout the shift  Outcome: Progressing  Goal: Free from acute confusion related to pain meds throughout the shift  Outcome: Progressing     The patient's goals for the shift include rest    The clinical goals for the shift include wean pressors

## 2023-12-21 NOTE — PROCEDURES
Central Line Insertion Procedure Note:     Date/Time: 12/20/2023 11:30 PM     Performed by: Dhiraj De La Garza MD  Authorized by: Dhiraj De La Garza MD    Consent:     Consent obtained:  Written    Consent given by:  Patient    Risks, benefits, and alternatives were discussed: yes      Risks discussed:  Arterial puncture, incorrect placement and pneumothorax    Alternatives discussed:  Delayed treatment  Universal protocol:     Procedure explained and questions answered to patient or proxy's satisfaction: yes      Relevant documents present and verified: yes      Test results available: yes      Imaging studies available: yes      Required blood products, implants, devices, and special equipment available: yes      Site/side marked: yes      Immediately prior to procedure, a time out was called: yes      Patient identity confirmed:  Arm band  Pre-procedure details:     Indication(s): central venous access      Hand hygiene: Hand hygiene performed prior to insertion      Sterile barrier technique: All elements of maximal sterile technique followed      Skin preparation:  Chlorhexidine    Skin preparation agent: Skin preparation agent completely dried prior to procedure    Sedation:     Sedation type:  None  Anesthesia:     Anesthesia method:  None  Procedure details:     Location:  Left Femoral Vein Triple Lumen    Patient position:  Supine    Procedural supplies:  Triple lumen    Catheter size:  9 Fr    Landmarks identified: yes      Ultrasound guidance: yes      Ultrasound guidance timing: prior to insertion      Sterile ultrasound techniques: Sterile gel and sterile probe covers were used      Number of attempts:  1    Successful placement: yes    Post-procedure details:     Post-procedure:  Dressing applied    Assessment:  Blood return through all ports and placement verified by x-ray    Procedure completion:  Tolerated  Assisted by: ABRAHAMU RN:  MALIK HUBBARD RN        The patient remove her previous right femoral  triple-lumen catheter.  She is hemodynamically unstable and requiring vasopressor support.  Provided me with electronic consent to perform this procedure.  Procedure conducted under complete aseptic measures.

## 2023-12-21 NOTE — PROGRESS NOTES
Patient not medically clear. Patient remains in the ICU and pressors. At the time of discharge, patient will go to [facility]. Precert needed, not started at this time. Will follow.     **PATIENT DOES NOT HAVE A SAFE DISCHARGE PLAN      Shauna Jensen RN

## 2023-12-21 NOTE — NURSING NOTE
BSSR. Patient resting in bed with eyes closed. Dr. Scott rounding. Chest rise and fall visualized. No distress noted. Covid and Cdiff precautions maintained. Call light within reach.

## 2023-12-21 NOTE — NURSING NOTE
On rounding, R subclavian dialysis catheter dressing is current, dry and intact. L femoral triple lumen catheter is dry and intact. Two lines in use, infusing without difficulty. Third line flushes easily with brisk blood return. Curos cap in place.

## 2023-12-21 NOTE — CARE PLAN
Problem: Diabetes  Goal: Achieve decreasing blood glucose levels by end of shift  Outcome: Progressing  Goal: Increase stability of blood glucose readings by end of shift  Outcome: Progressing  Goal: Maintain electrolyte levels within acceptable range throughout shift  Outcome: Progressing  Goal: Maintain glucose levels >70mg/dl to <250mg/dl throughout shift  Outcome: Progressing  Goal: No changes in neurological exam by end of shift  Outcome: Progressing  Goal: Learn about and adhere to nutrition recommendations by end of shift  Outcome: Progressing  Goal: Vital signs within normal range for age by end of shift  Outcome: Progressing  Goal: Increase self care and/or family involovement by end of shift  Outcome: Progressing  Goal: Receive DSME education by end of shift  Outcome: Progressing     Problem: Pain - Adult  Goal: Verbalizes/displays adequate comfort level or baseline comfort level  Outcome: Progressing     Problem: Safety - Adult  Goal: Free from fall injury  Outcome: Progressing     Problem: Discharge Planning  Goal: Discharge to home or other facility with appropriate resources  Outcome: Progressing     Problem: Chronic Conditions and Co-morbidities  Goal: Patient's chronic conditions and co-morbidity symptoms are monitored and maintained or improved  Outcome: Progressing     Problem: Skin  Goal: Decreased wound size/increased tissue granulation at next dressing change  Outcome: Progressing  Goal: Participates in plan/prevention/treatment measures  Outcome: Progressing  Goal: Prevent/manage excess moisture  Outcome: Progressing  Goal: Prevent/minimize sheer/friction injuries  Outcome: Progressing  Goal: Promote/optimize nutrition  Outcome: Progressing  Goal: Promote skin healing  Outcome: Progressing     Problem: Fall/Injury  Goal: Not fall by end of shift  Outcome: Progressing  Goal: Be free from injury by end of the shift  Outcome: Progressing  Goal: Verbalize understanding of personal risk factors  for fall in the hospital  Outcome: Progressing  Goal: Verbalize understanding of risk factor reduction measures to prevent injury from fall in the home  Outcome: Progressing  Goal: Pace activities to prevent fatigue by end of the shift  Outcome: Progressing     Problem: Nutrition  Goal: Oral intake greater than 50%  Outcome: Progressing  Goal: Oral intake greater 75%  Outcome: Progressing  Goal: Consume prescribed supplement  Outcome: Progressing  Goal: Adequate PO fluid intake  Outcome: Progressing  Goal: Nutrition support goals are met within 48 hrs  Outcome: Progressing  Goal: Nutrition support is meeting 75% of nutrient needs  Outcome: Progressing  Goal: Lab values WNL  Outcome: Progressing  Goal: Electrolytes WNL  Outcome: Progressing  Goal: Promote healing  Outcome: Progressing  Goal: Maintain stable weight  Outcome: Progressing  Goal: Reduce weight from edema/fluid  Outcome: Progressing  Goal: Gradual weight gain  Outcome: Progressing     Problem: Pain  Goal: My pain/discomfort is manageable  Outcome: Progressing     Problem: Safety  Goal: Patient will be injury free during hospitalization  Outcome: Progressing  Goal: I will remain free of falls  Outcome: Progressing     Problem: Daily Care  Goal: Daily care needs are met  Outcome: Progressing     Problem: Psychosocial Needs  Goal: Demonstrates ability to cope with hospitalization/illness  Outcome: Progressing  Goal: Collaborate with me, my family, and caregiver to identify my specific goals  Outcome: Progressing     Problem: Discharge Barriers  Goal: My discharge needs are met  Outcome: Progressing     Problem: Pain  Goal: Takes deep breaths with improved pain control throughout the shift  Outcome: Progressing  Goal: Turns in bed with improved pain control throughout the shift  Outcome: Progressing  Goal: Walks with improved pain control throughout the shift  Outcome: Progressing  Goal: Performs ADL's with improved pain control throughout shift  Outcome:  Progressing  Goal: Participates in PT with improved pain control throughout the shift  Outcome: Progressing  Goal: Free from opioid side effects throughout the shift  Outcome: Progressing  Goal: Free from acute confusion related to pain meds throughout the shift  Outcome: Progressing

## 2023-12-21 NOTE — PROGRESS NOTES
Ayanna Gross is a 73 y.o. female on day 15 of admission presenting with Hypotension, unspecified hypotension type.      Subjective     Last night, pulled CVC from R fem, and L femoral CVC replaced. No new complaints. Asking to dial a number to called daughter.            Objective     Last Recorded Vitals  BP (!) 77/34   Pulse 93   Temp 36.4 °C (97.5 °F) (Axillary)   Resp 17   Wt 70.5 kg (155 lb 6.8 oz)   SpO2 95%   Intake/Output last 3 Shifts:    Intake/Output Summary (Last 24 hours) at 12/21/2023 0739  Last data filed at 12/20/2023 2011  Gross per 24 hour   Intake 2360.9 ml   Output 3200 ml   Net -839.1 ml         Admission Weight  Weight: 72.6 kg (160 lb) (12/06/23 1301)    Daily Weight  12/20/23 : 70.5 kg (155 lb 6.8 oz)    Image Results  US thoracentesis  Narrative: Interpreted By:  Todd La,   STUDY:  US THORACENTESIS;  12/15/2023 3:43 pm      INDICATION:  Signs/Symptoms:L pleural effusion.      COMPARISON:  Chest x-rayDated 12/14/2023.      ACCESSION NUMBER(S):  [id number]      ORDERING CLINICIAN:  PARIS LUCAS      TECHNIQUE:  INTERVENTIONALIST(S):  Todd La M.D.      CONSENT:  The patient/patient's POA/next of kin was informed of the nature of  the proposed procedure. The purposes, alternatives, risks, and  benefits were explained and discussed. All questions were answered  and consent was obtained.      SEDATION:  None      MEDICATION/CONTRAST:  No additional      TIME OUT:  A time out was performed immediately prior to procedure start with  the interventional team, correctly identifying the patient name, date  of birth, MRN, procedure, anatomy (including marking of site and  side), patient position, procedure consent form, relevant laboratory  and imaging test results, antibiotic administration, safety  precautions, and procedure-specific equipment needs.      FINDINGS:  The patient was placed in the sitting position.      The pleural space was examined with grey scale ultrasound,  and the  most accessible fluid identified and marked for thoracentesis.      The skin was prepped and draped in usual manner. Local anesthesia  with Lidocaine was administered and a  left-sided thoracentesis was  performed.  A 5 [facility] One-Step thoracentesis needle/catheter was  then placed where marked.  Approximately 600 mL of yellowish colored  fluid was removed.  The needle/catheter was then withdrawn.      The patient tolerated the procedure well and there were no immediate  complications. Specimen(s) sent to the laboratory and pathology for  further evaluation, per the requesting team.      Impression: Uneventful  left-sided thoracentesis, as detailed above.      I personally performed and/or directly supervised this study and was  present for the entire procedure.      I personally reviewed the study and resident interpretation. I agree  with the findings as stated.      Performed and dictated at [facility].      MACRO:  None      Signed by: Todd aL 12/20/2023 9:48 PM  Dictation workstation:   [id number]      Physical Exam  Constitutional:       Appearance: Normal appearance. She is not toxic-appearing.   HENT:      Head: Normocephalic.      Right Ear: External ear normal.      Left Ear: External ear normal.   Eyes:      Conjunctiva/sclera: Conjunctivae normal.   Cardiovascular:      Rate and Rhythm: Normal rate.      Heart sounds: Normal heart sounds.   Pulmonary:      Effort: Pulmonary effort is normal.      Breath sounds: Normal breath sounds.   Abdominal:      General: Abdomen is flat.      Palpations: Abdomen is soft.   Skin:     General: Skin is warm.   Neurological:      General: No focal deficit present.      Mental Status: She is oriented to person, place, and time.   Psychiatric:         Mood and Affect: Mood normal.       General: alert, no diaphoresis   Lungs: CTA BL   Heart: RRR, no LE edema BL   GI: abdomen soft, nontender, nondistended, BS  present   MSK: no joint effusion or deformity   Skin: no rashes, erythema, or ecchymosis   Neuro: grossly normal cognition, motor strength, sensation      Relevant Results               Scheduled medications  apixaban, 2.5 mg, oral, BID  calcium acetate, 667 mg, oral, TID with meals  collagenase, , Topical, Daily  doxycycline, 100 mg, oral, Daily  epoetin di or biosimilar, 10,000 Units, intravenous, Every Mon/Wed/Fri  escitalopram, 10 mg, oral, Daily  febuxostat, 40 mg, oral, Every other day  fenofibrate, 160 mg, oral, Daily  fidaxomicin, 200 mg, oral, BID  gabapentin, 100 mg, oral, BID  heparin, 2,000 Units, intra-catheter, After Dialysis  heparin, 2,000 Units, intra-catheter, After Dialysis  insulin lispro, 0-5 Units, subcutaneous, TID with meals  ipratropium-albuteroL, 3 mL, nebulization, TID  metoprolol succinate XL, 12.5 mg, oral, Daily  midodrine, 15 mg, oral, TID with meals  nystatin, 1 Application, Topical, BID  simvastatin, 40 mg, oral, Nightly      Continuous medications  norepinephrine, 0.01-3 mcg/kg/min, Last Rate: 0.01 mcg/kg/min (12/21/23 0730)      PRN medications  PRN medications: acetaminophen, acetaminophen, dextrose 10 % in water (D10W), dextrose, glucagon, HYDROcodone-acetaminophen, oxygen, polyethylene glycol, zinc oxide    Results for orders placed or performed during the hospital encounter of 12/06/23 (from the past 24 hour(s))   POCT GLUCOSE   Result Value Ref Range    POCT Glucose 159 (H) 74 - 99 mg/dL   Cortisol   Result Value Ref Range    Cortisol 12.0 2.5 - 20.0 ug/dL   POCT GLUCOSE   Result Value Ref Range    POCT Glucose 114 (H) 74 - 99 mg/dL   Cortisol   Result Value Ref Range    Cortisol 23.4 (H) 2.5 - 20.0 ug/dL   Cortisol   Result Value Ref Range    Cortisol 26.3 (H) 2.5 - 20.0 ug/dL   POCT GLUCOSE   Result Value Ref Range    POCT Glucose 104 (H) 74 - 99 mg/dL   POCT GLUCOSE   Result Value Ref Range    POCT Glucose 102 (H) 74 - 99 mg/dL   Renal Function Panel   Result Value Ref  Range    Glucose 127 (H) 65 - 99 mg/dL    Sodium 135 133 - 145 mmol/L    Potassium 3.7 3.4 - 5.1 mmol/L    Chloride 97 97 - 107 mmol/L    Bicarbonate 24 24 - 31 mmol/L    Urea Nitrogen 8 8 - 25 mg/dL    Creatinine 2.00 (H) 0.40 - 1.60 mg/dL    eGFR 26 (L) >60 mL/min/1.73m*2    Calcium 8.3 (L) 8.5 - 10.4 mg/dL    Phosphorus 1.0 (L) 2.5 - 4.5 mg/dL    Albumin 2.8 (L) 3.5 - 5.0 g/dL    Anion Gap 14 <=19 mmol/L   CBC and Auto Differential   Result Value Ref Range    WBC 14.2 (H) 4.4 - 11.3 x10*3/uL    nRBC 0.3 (H) 0.0 - 0.0 /100 WBCs    RBC 3.41 (L) 4.00 - 5.20 x10*6/uL    Hemoglobin 9.6 (L) 12.0 - 16.0 g/dL    Hematocrit 28.6 (L) 36.0 - 46.0 %    MCV 84 80 - 100 fL    MCH 28.2 26.0 - 34.0 pg    MCHC 33.6 32.0 - 36.0 g/dL    RDW 22.5 (H) 11.5 - 14.5 %    Platelets 160 150 - 450 x10*3/uL    Neutrophils % 64.2 40.0 - 80.0 %    Immature Granulocytes %, Automated 2.1 (H) 0.0 - 0.9 %    Lymphocytes % 21.1 13.0 - 44.0 %    Monocytes % 9.1 2.0 - 10.0 %    Eosinophils % 2.7 0.0 - 6.0 %    Basophils % 0.8 0.0 - 2.0 %    Neutrophils Absolute 9.11 (H) 1.60 - 5.50 x10*3/uL    Immature Granulocytes Absolute, Automated 0.30 0.00 - 0.50 x10*3/uL    Lymphocytes Absolute 2.99 0.80 - 3.00 x10*3/uL    Monocytes Absolute 1.29 (H) 0.05 - 0.80 x10*3/uL    Eosinophils Absolute 0.38 0.00 - 0.40 x10*3/uL    Basophils Absolute 0.11 (H) 0.00 - 0.10 x10*3/uL        Assessment/Plan        This patient has a central line   Reason for the central line remaining today? Parenteral medication            Principal Problem:    Hypotension, unspecified hypotension type  Active Problems:    Paroxysmal atrial fibrillation (CMS/[facility])    Peripheral vascular disease (CMS/[facility])    Orthostatic hypotension    Mixed hyperlipidemia    End-stage renal disease on hemodialysis (CMS/[facility])    DM (diabetes mellitus) (CMS/[facility])    Absent pedal pulses    Shock  - trend is towards improvement, albeit slow, and in picture of known hypotension  -  suspect septic overall but could  be element of hypovolemia/hypoalbuminemia given diarrhea from c diff and poor nutritional status.  - on levophed, titrating for goal systolic in the 70s. Reamains on Levophed though at lowest rate.   - continue high dose midodrine per Dr. Haroldo Adams.   - Empiric Zosyn has been stopped by ID  - continue Dificid per ID    A fibrillation  Continue low-dose beta blocker - will put on hold formally due to BP  Eliquis for stroke prophylaxis     COVID-19  - diagnosed 12/14  - ID on consult    Dysphagia  - passed eval with nursing last night - SLP formally today     Mixed hyperlipidemia  Continue statin, fenofibrate      DM (diabetes mellitus)   Hold Tradjenta   AC/HS glucose with SSI coverage   Hypoglycemia protocol      Groin excoriation   Wound care consult   Nystatin powder   Keep skin clean and dry   Sacrum covered with Mepilex.      C-diff   PCR positive and negative toxin.   Now on fidaxomicin    ESRD  HD per renal.         Malnutrition Diagnosis Status: Ongoing  Malnutrition Diagnosis: Moderate malnutrition related to acute disease or injury  As Evidenced by: 1-2% unintentional wt loss in 1 week (~4% in 2 weeks), Poor intake <75% of estimated energy requirement > 7days, and mild fat and muscle deficits  I agree with the dietitian's malnutrition diagnosis.       Needs continued ICU care for vasopressors and arterial line monitoring.    SLP            Daniel Rubio DO

## 2023-12-21 NOTE — PROGRESS NOTES
"Ayanna Gross is a 73 y.o. female on day 15 of admission presenting with Hypotension, unspecified hypotension type.    Subjective   Interval History:    afebrile  Room not entered-limit exposure   Patient observed through glass window  Patient discussed with nurse-plan  Still on low-dose pressors        Review of Systems    Objective   Range of Vitals (last 24 hours)  Heart Rate:  []   Temp:  [36.2 °C (97.2 °F)-37.3 °C (99.1 °F)]   Resp:  [6-28]   BP: (77)/(34)   Height:  [157.5 cm (5' 2.01\")]   SpO2:  [89 %-100 %]   Daily Weight  12/20/23 : 70.5 kg (155 lb 6.8 oz)    Body mass index is 28.42 kg/m².    Physical Exam   awake, alert    Antibiotics  sodium chloride 0.9 % bolus 500 mL  cefepime (Maxipime) 1 g in dextrose 5 % 50 mL IV  vancomycin-diluent combo no.1 (Xellia) IVPB 1 g  apixaban (Eliquis) tablet 2.5 mg  calcium acetate (Phoslo) capsule 2,000 mg  escitalopram (Lexapro) tablet 10 mg  febuxostat (Uloric) tablet 40 mg  fenofibrate (Triglide) tablet 160 mg  gabapentin (Neurontin) capsule 300 mg  metoprolol succinate XL (Toprol-XL) 24 hr tablet 12.5 mg  midodrine (Proamatine) tablet 10 mg  nystatin (Mycostatin) 100,000 unit/gram powder 1 Application  B complex-vitamin C tablet 1 tablet  simvastatin (Zocor) tablet 40 mg  acetaminophen (Tylenol) tablet 650 mg  acetaminophen (Tylenol) tablet 650 mg  polyethylene glycol (Glycolax, Miralax) packet 17 g  dextrose 50 % injection 25 g  glucagon (Glucagen) injection 1 mg  dextrose 10 % in water (D10W) infusion  insulin lispro (HumaLOG) injection 0-5 Units  zinc oxide 20 % ointment 1 Application  calcium acetate (Phoslo) capsule 667 mg  vancomycin (Vancocin) capsule 125 mg  lidocaine PF (Xylocaine) 10 mg/mL (1 %) injection  heparin 1,000 unit/mL injection 2,000 Units  heparin 1,000 unit/mL injection 2,000 Units  midodrine (Proamatine) tablet 15 mg  albumin human 25 % solution 25 g  polyethylene glycol (Glycolax, Miralax) packet 17 g  collagenase 250 unit/gram " ointment  vancomycin (Vancocin) 1,750 mg in dextrose 5 % in water (D5W) 250 mL IV  vancomycin-diluent combo no.1 (Xellia) IVPB 1,750 mg  albumin human 25 % solution 12.5 g  heparin 1,000 unit/mL injection 2,000 Units  heparin 1,000 unit/mL injection 2,000 Units  vancomycin (Vancocin) placeholder  doxycycline (Vibramycin) capsule 100 mg  potassium chloride CR (Klor-Con) ER tablet 10 mEq  gabapentin (Neurontin) capsule 100 mg  heparin 1,000 unit/mL injection 2,000 Units  heparin 1,000 unit/mL injection 2,000 Units  piperacillin-tazobactam-dextrose (Zosyn) IV 2.25 g  doxycycline (Vibramycin) capsule 100 mg  ipratropium-albuteroL (Duo-Neb) 0.5-2.5 mg/3 mL nebulizer solution 3 mL  heparin 1,000 unit/mL injection 2,000 Units  heparin 1,000 unit/mL injection 2,000 Units  ipratropium-albuteroL (Duo-Neb) 0.5-2.5 mg/3 mL nebulizer solution 3 mL  dexAMETHasone (Decadron) injection 6 mg  albumin human 25 % solution 25 g  epoetin di-epbx (Retacrit) injection 10,000 Units  lidocaine PF (Xylocaine) 20 mg/mL (2 %) injection  potassium chloride CR (Klor-Con) ER tablet 10 mEq  albumin human 5 % infusion 25 g  potassium chloride CR (Klor-Con M20) ER tablet 20 mEq  norepinephrine (Levophed) 8 mg in dextrose 5% 250 mL (0.032 mg/mL) infusion (premix)  oxygen (O2) therapy  piperacillin-tazobactam-dextrose (Zosyn) IV 2.25 g  heparin 1,000 unit/mL injection 2,000 Units  heparin 1,000 unit/mL injection 2,000 Units  potassium chloride 20 mEq in 100 mL IV premix  fidaxomicin (Dificid) tablet 200 mg  norepinephrine (Levophed) 8 mg in dextrose 5% 250 mL (0.032 mg/mL) infusion (premix)  heparin 1,000 unit/mL injection 2,000 Units  heparin 1,000 unit/mL injection 2,000 Units  HYDROcodone-acetaminophen (Norco) 5-325 mg per tablet 1 tablet  dextrose 50 % injection 25 g  glucagon (Glucagen) injection 1 mg  dextrose 10 % in water (D10W) infusion  potassium chloride (Klor-Con) packet 20 mEq  potassium chloride 20 mEq in 100 mL IV premix  cosyntropin  (Cortrosyn) injection 250 mcg      Relevant Results  Labs  Results from last 72 hours   Lab Units 12/21/23 0451 12/20/23  0512 12/19/23  0537   WBC AUTO x10*3/uL 14.2* 12.4* 11.8*   HEMOGLOBIN g/dL 9.6* 10.0* 9.9*   HEMATOCRIT % 28.6* 29.2* 30.3*   PLATELETS AUTO x10*3/uL 160 160 114*   NEUTROS PCT AUTO % 64.2 58.0 60.9   LYMPHS PCT AUTO % 21.1 25.4 23.3   MONOS PCT AUTO % 9.1 8.8 9.7   EOS PCT AUTO % 2.7 4.8 3.5     Results from last 72 hours   Lab Units 12/21/23 0451 12/20/23  0512 12/19/23  0537   SODIUM mmol/L 135 136 134   POTASSIUM mmol/L 3.7 3.0* 3.1*   CHLORIDE mmol/L 97 98 97   CO2 mmol/L 24 25 24   BUN mg/dL 8 17 10   CREATININE mg/dL 2.00* 3.30* 2.20*   GLUCOSE mg/dL 127* 171* 126*   CALCIUM mg/dL 8.3* 8.6 8.5   ANION GAP mmol/L 14 13 13   EGFR mL/min/1.73m*2 26* 14* 23*   PHOSPHORUS mg/dL 1.0* 1.2* 0.5*     Results from last 72 hours   Lab Units 12/21/23 0451 12/20/23  0512 12/19/23  0537   ALBUMIN g/dL 2.8* 2.9* 3.0*     Estimated Creatinine Clearance: 23.7 mL/min (A) (by C-G formula based on SCr of 2 mg/dL (H)).  CRP   Date Value Ref Range Status   06/23/2023 19.9 (H) 0 - 2.0 MG/DL Final     Comment:     Performed at [address] [address]   05/22/2022 1.0 0 - 2.0 MG/DL Final     Comment:     Performed at [address] [address]   12/13/2020 10.1 (H) 0 - 2.0 MG/DL Final     Comment:     Performed at [address] [address]     Microbiology   reviewed  Imaging  US thoracentesis    Result Date: 12/20/2023  Interpreted By:  Todd La, STUDY: US THORACENTESIS;  12/15/2023 3:43 pm   INDICATION: Signs/Symptoms:L pleural effusion.   COMPARISON: Chest x-rayDated 12/14/2023.   ACCESSION NUMBER(S): [id number]   ORDERING CLINICIAN: PARIS LUCAS   TECHNIQUE: INTERVENTIONALIST(S): Todd La M.D.   CONSENT: The patient/patient's POA/next of kin was informed of the nature of the proposed procedure. The purposes, alternatives,  risks, and benefits were explained and discussed. All questions were answered and consent was obtained.   SEDATION: None   MEDICATION/CONTRAST: No additional   TIME OUT: A time out was performed immediately prior to procedure start with the interventional team, correctly identifying the patient name, date of birth, MRN, procedure, anatomy (including marking of site and side), patient position, procedure consent form, relevant laboratory and imaging test results, antibiotic administration, safety precautions, and procedure-specific equipment needs.   FINDINGS: The patient was placed in the sitting position.   The pleural space was examined with grey scale ultrasound, and the most accessible fluid identified and marked for thoracentesis.   The skin was prepped and draped in usual manner. Local anesthesia with Lidocaine was administered and a  left-sided thoracentesis was performed.  A 5 Jordanian One-Step thoracentesis needle/catheter was then placed where marked.  Approximately 600 mL of yellowish colored fluid was removed.  The needle/catheter was then withdrawn.   The patient tolerated the procedure well and there were no immediate complications. Specimen(s) sent to the laboratory and pathology for further evaluation, per the requesting team.       Uneventful  left-sided thoracentesis, as detailed above.   I personally performed and/or directly supervised this study and was present for the entire procedure.   I personally reviewed the study and resident interpretation. I agree with the findings as stated.   Performed and dictated at [facility].   MACRO: None   Signed by: Todd La 12/20/2023 9:48 PM Dictation workstation:   [id number]    XR chest 1 view    Result Date: 12/19/2023  Interpreted By:  Judd Lee, STUDY: XR CHEST 1 VIEW; 12/19/2023 5:44 am   INDICATION: Signs/Symptoms:pleural effusion.   COMPARISON: December 14, 2023   ACCESSION NUMBER(S): [id number]    ORDERING CLINICIAN: PARIS LUCAS   FINDINGS: RESULT: Right sided central venous catheter is again noted extending into the right atrium. The distal tip is not well visualized. Left-sided vascular stents are noted in the left subclavian region. Left axillary surgical clips are noted. The cardiac silhouette is within normal limits for size. Mediastinal contours are unremarkable. Left basilar opacity obscures the left hemidiaphragm with hazy opacity throughout the left lung. There are degenerative changes of the thoracic spine and shoulders.       Persistent left basilar opacity obscures the left hemidiaphragm with hazy opacity extending through the left hemithorax. Findings likely represent pleural effusion atelectasis. Underlying infiltrate or mass is not excluded. Overall findings not significantly changed compared to previous exam     Signed by: Judd Lee 12/19/2023 9:02 AM Dictation workstation:   [id number]    XR chest 1 view    Result Date: 12/14/2023  Interpreted By:  Judd Lee, STUDY: XR CHEST 1 VIEW; 12/14/2023 10:43 am   INDICATION: Hypoxic respiratory failure.   COMPARISON: December 8, 2023   ACCESSION NUMBER(S): [id number]   ORDERING CLINICIAN: RODRI SURESH   FINDINGS: RESULT: Right-sided double-lumen dialysis catheter is noted with distal tip at the level of the right atrium. Vascular stents are noted in the left subclavian region. Surgical clips are noted in the left axilla. Cardiac silhouette size is indeterminate as the left heart border is obscured. There are calcifications involving the thoracic aorta. Left basilar obscurity obscures the left hemidiaphragm and left heart border with hazy opacity extending to the left mid lung. There are degenerative changes of the bilateral shoulders.       Increasing density in the left mid and lower lung suggests increasing infiltrate or pleural effusion.     Signed by: Judd Lee 12/14/2023 10:58 AM Dictation workstation:    [id number]    Vascular US ankle brachial index (OCTAVIO) without exercise    Result Date: 12/13/2023           [place] [address]            Phone [phone]  Vascular Lab Report  [facility] US ANKLE BRACHIAL INDEX (OCTAVIO) WITHOUT EXERCISE Patient Name:      BASIA VEGA      Isaac Physician: [id number] Lizet Messer MD Study Date:        12/11/2023          Ordering Provider: [id number] MARIANN SUTTON MRN/PID:           [id number]            Fellow: Accession#:        [id number]        Technologist:      Luz Maria Trivedi RVT Date of Birth/Age: 1950 / 73 years Technologist 2:    Dara Parr RVT Gender:            F                   Encounter#:        [id number] Admission Status:  Inpatient           Location           [facility]                                        Performed:  Diagnosis/ICD: Other specified symptoms and signs involving the circulatory and                respiratory systems-R09.89 Indication:    Peripheral vascular disease CPT Codes:     14175.52 Peripheral artery OCTAVIO Only Reduced Service  Pertinent History: Absent pedal pulses. Patient is on dialysis.  CONCLUSIONS: Right Lower PVR: Evidence of moderate arterial occlusive disease in the right lower extremity at rest. Right pressures of >220 mmHg suggest no compressibility of vessels and may make absolute Segmental Limb Pressures (SLP) unreliable. Decreased digital perfusion noted. Biphasic flow is noted in the right dorsalis pedis artery. Left Lower PVR: Evidence of moderate arterial occlusive disease in the left lower extremity at rest. Left pressures of >220 mmHg suggest no compressibility of vessels and may make absolute Segmental Limb Pressures (SLP) unreliable. Decreased digital perfusion noted. Biphasic flow is noted in the left dorsalis pedis artery. Additional Findings: Technically difficult and limited exam due to patient's  positioning, movement and inability to cooperate with exam.  Imaging & Doppler Findings:  RIGHT Lower PVR              Pressures Right Dorsalis Pedis (Ankle) 255 mmHg   LEFT Lower PVR              Pressures Left Dorsalis Pedis (Ankle) 255 mmHg   [id number] Lizet Messer MD Electronically signed by [id number] Lizet Messer MD on 12/13/2023 at 8:38:56 AM  ** Final **     CT head wo IV contrast    Result Date: 12/11/2023  Interpreted By:  Brendon Hook, STUDY: CT HEAD WO IV CONTRAST; 12/11/2023 5:20 pm   INDICATION: Signs/Symptoms:acute confusion.   COMPARISON: 20 August 2023   ACCESSION NUMBER(S): [id number]   ORDERING CLINICIAN: RODRI SURESH   TECHNIQUE: CT was performed with one or more of the following dose reduction techniques: automated exposure control, adjustment of the mA and/or kV according to patient size, or use of iterative reconstruction technique.       PROCEDURE: 3.0 mm axial images were obtained through the brain, to include the posterior fossa without intravenous contrast enhancement.   FINDINGS: The patient's head is turned to the left and slightly canted in the gantry. Mild motion artifact is seen. There is symmetric volume loss of the cerebral hemispheres. Moderate  decreased attenuation in the bilateral periventricular white matter, consistent with microangiopathy is noted.  There is no encephalomalacic change. Brainstem is unremarkable. Cerebellar hemispheres are symmetric. No subarachnoid, intraparenchymal, subdural or interventricular hemorrhage. No intra- or extra-axial mass or abnormal blood products are demonstrated. Hyperostosis frontalis interna is seen. Slight hypoplasia of the left mastoid.   The paranasal sinuses and mastoids as well as calvarium are unremarkable.       1. No acute intracranial findings. 2. Symmetric volume loss of the cerebral hemispheres. 3. Periventricular Microangiopathy. 4. No posttraumatic abnormality.   This report has been produced using speech recognition. This exam  is available in DICOM format to non-affiliated healthcare facilities on a secure media free searchable basis with prior patient authorization. The patient exposure is reported to a radiation dose index registry. All CT examinations are performed with one or more of the following dose reduction techniques: Automated Exposure Control, Adjustment of mA and/or KV according to patient size, or use of iterative reconstruction techniques.   MACRO: None   Signed by: Brendon Hook 12/11/2023 7:05 PM Dictation workstation:   [id number]    US thoracentesis    Result Date: 12/8/2023  Interpreted By:  Sandro Hernandez, STUDY: US THORACENTESIS; 12/7/2023 3:16 pm   INDICATION: Signs/Symptoms:Left pleural effusion question of whether this needs thoracentesis, history of ESRD, just had dialysis today.   COMPARISON: None   ACCESSION NUMBER(S): [id number]   ORDERING CLINICIAN: JEZ LONG   TECHNIQUE: Informed consent obtained. Patient positionedsitting upright. Skin prepped, draped and anesthetized. Under ultrasound guidance, a centesis catheter/needle was advanced into rightpleural cavity.   FINDINGS: A total of 750 cc of clear yellow fluid was aspirated. A sample was sent for analysis. The patient tolerated the procedure well.       Ultrasound-guided left thoracentesis.     Signed by: Sandro Hernandez 12/8/2023 3:01 PM Dictation workstation:   [id number]    XR chest 1 view    Result Date: 12/8/2023  Interpreted By:  Maria Garcia, STUDY: XR CHEST 1 VIEW 12/8/2023 11:57 am   INDICATION: Signs/Symptoms:S/P thoracentesis   COMPARISON: 12/06/2023   ACCESSION NUMBER(S): [id number]   ORDERING CLINICIAN: JEZ LONG   TECHNIQUE: AP erect view of the chest at bedside   FINDINGS: There is an interval decrease in size of left pleural effusion when compared with the study done 2 days earlier. No pneumothorax is seen.   There is some residual left pleural effusion with infiltrates seen in the left mid and lower lung field. The right  lung is clear.   There are surgical clips within the left axilla. Stent grafts are visible in the left subclavian region extending into the left axillary region. A tunneled right jugular dialysis catheter terminates within right atrium.       Decreased size of left effusion following ultrasound-guided thoracentesis with no pneumothorax.   There is some residual left pleural effusion noted with infiltrate in the left mid and lower lung field.   Signed by: Maria Garcia 12/8/2023 12:51 PM Dictation workstation:   [id number]    XR foot 3+ views bilateral    Result Date: 12/7/2023  Interpreted By:  Brendon Hook, STUDY: XR FOOT 3+ VIEWS BILATERAL; 12/7/2023 3:31 pm   INDICATION: Signs/Symptoms:Wound/Abscess/Acute neha process rule out/Comparative Exam.   COMPARISON: None   ACCESSION NUMBER(S): [id number]   ORDERING CLINICIAN: JOSSELYN STAHL   TECHNIQUE: Nonweightbearing AP, lateral and oblique views of the bilateral foot were performed.   FINDINGS: Right foot: Large heel spur is seen. Mild Achilles enthesopathy demonstrated. Moderate atherosclerotic disease is seen. There is suggestion of loss of the arch on this nonweightbearing view. Degenerative change anterior plafond demonstrated and dorsal talus as well as posterior plafond and dorsal talus. Sclerosis anterior calcaneus is seen. Degenerative changes mild between the midfoot and forefoot. 1st metatarsophalangeal joint degenerative changes demonstrated. 2nd ray absent phalanges. Distal phalanges of the 3rd through 5th rays with overlap were difficult to visualize. Distal phalanx of the great toe is difficult to visualize.   Left foot: Large heel spur is seen. Advanced atherosclerotic disease demonstrated. Degenerative change in the calcaneus and talus seen with moderate anterior and mild posterior plafond degenerative change. Mild degenerative change in the midfoot and forefoot is seen. Distal phalanx of the great toe is absent. Degenerative change of the 2nd  ray phalanges and 3rd ray phalanges seen with degenerative change of the 4th and 5th digits not well seen.       1. Right foot: Large heel spur. Atherosclerotic disease. Suggestion of diffuse osteopenia. No sclerosis to suggest osteomyelitis. No subcutaneous air to suggest gangrene. Absent flanges 2nd ray with difficult to visualized distal phalanges of the other digits.   2. Left foot: Large heel spur. Degenerative changes of the hindfoot midfoot and forefoot with atherosclerotic disease. Absent distal phalanx of the great toe. No periostitis or abnormal sclerosis with diffuse osteopenia to suggest osteomyelitis. No subcutaneous air to suggest gangrene.       Signed by: Brendon Hook 12/7/2023 8:39 PM Dictation workstation:   [id number]    ECG 12 Lead    Result Date: 12/7/2023   Poor data quality, interpretation may be adversely affected Normal sinus rhythm Right bundle branch block Septal infarct , age undetermined Possible Lateral infarct , age undetermined Abnormal ECG No previous ECGs available Confirmed by Hammad Callahan ([id number]) on 12/7/2023 1:07:11 PM    XR chest 1 view    Result Date: 12/6/2023  Interpreted By:  Roger Murdock, STUDY: XR CHEST 1 VIEW;  12/6/2023 2:08 pm   INDICATION: Signs/Symptoms:Brief AMS and hypotension, now resolved.   COMPARISON: 08/21/2023..   ACCESSION NUMBER(S): [id number]   ORDERING CLINICIAN: PATEL TINSLEY   FINDINGS: New moderate left pleural effusion. There is silhouetting of the left heart border. Left subclavian stent present. Right-sided double-lumen central venous catheter terminates within the right ventricle. No acute osseous findings.       New, moderate left pleural effusion.     MACRO: None   Signed by: Roger Murdock 12/6/2023 2:18 PM Dictation workstation:   [id number]     Assessment/Plan   Shock-etiology unclear  Unstageable Bilateral heel ulcers  C-difficile infection-resolving  Type 2 diabetes mellitus with peripheral angiopathy with gangrene   acute respiratory  failure-resolved  Coronavirus infection  Pleural effusion, s/p thoracentesis  History of chronic MRSA bacteremia, infective endocarditis-completed IV vancomycin, now on Doxycycline   Hypotension-resolved  ESRD on hemodialysis  Severe malnutrition-prealbumin of 3.2           Continue Dificid  Oral doxycycline as suppressive therapy for MRSA bacteremia with possible infective endocarditis  Monitor temperature  and WBC  Local care  Offloading  High-protein diet  Monitor stool  Podiatry follow up  Wean off pressors   droplet plus precautions  Contact plus precautions    Stephen Coulter MD

## 2023-12-21 NOTE — PROGRESS NOTES
Speech-Language Pathology    Inpatient Speech-Language Pathology Clinical Swallow Evaluation    Patient Name: Ayanna Gross  MRN: [id number]  Today's Date: 12/21/2023   Time Calculation  Start Time: 1055  Stop Time: 1122  Time Calculation (min): 27 min         Current Problem:   1. Hypotension, unspecified hypotension type  Insert arterial line    Insert arterial line    Central Line    Central Line      2. History of end stage renal disease        3. Wound infection        4. Absent pedal pulses  XR foot 3+ views bilateral    Vascular US ankle brachial index (OCTAVIO) without exercise    XR foot 3+ views bilateral    Vascular US ankle brachial index (OCTAVIO) without exercise    CANCELED: Vascular US PVR without exercise    CANCELED: Vascular US PVR without exercise        Past Medical History:   Diagnosis Date    Asthma     CAD (coronary artery disease)     CHF (congestive heart failure) (CMS/Formerly [facility])     Chronic kidney disease on chronic dialysis (CMS/Formerly [facility])     Hypertension     Personal history of diseases of the blood and blood-forming organs and certain disorders involving the immune mechanism     History of autoimmune disorder    Sleep apnea     Type 2 diabetes mellitus without complications (CMS/[facility]ly [facility]) 09/15/2022    Diabetes mellitus        Recommendations:  Risk for Aspiration: yes, if not positioned upright for intake  Solid Diet Recommendations : Regular (IDDSI Level 7)  Liquid Diet Recommendations: Thin (IDDSI Level 0)  Compensatory Swallowing Strategies: Upright 90 degrees as possible for all oral intake  Medication Administration Recommendations: Whole, With Liquid (pt prefers to take whole with milk)      Assessment:  Pt had clinical swallow evaluation on 12/14 with follow-up treatment on 12/18 revealing safety with regular diet and thin liquids with upright positioning, and no further treatment indicated at that time.    Pt referred for another swallow evaluation 12/20 due to changes noted, but was unable to be  assessed due to dialysis.  Per nurse Nan, pt exhibited difficulty swallowing, with pocketed medication noted.  Nurse reported that pt was lethargic at the time and was not able to pass nursing swallow screening, thus referral made to Speech-Language Pathology for Clinical Swallow Evaluation.    Plan:  Inpatient/Swing Bed or Outpatient: Inpatient  SLP Plan: No skilled SLP  Solid Consistency: Regular (IDDSI Level 7)  Liquid Consistency: Thin (IDDSI Level 0)      Subjective   Pt cooperative for assessment, stating that she likes to chew ice and is very fond of the ice in the hospital.  Pt agreeable to PO trials, but declined puree consistency snacks and juice offered.      General Visit Information:  Ordering Physician: Daniel Rubio DO  Reason for Referral: Suspected aspiration, difficulty swallowing, especially meds  Prior to Session Communication: Bedside nurse  Reviewed Procedures and Risks: Yes  Date of Onset: 12/06/23  Date of Order: 12/14/23  [employer] Diet: Regular/thin  Current Diet : Minced & moist until evaluation completed  Dysphagia Diagnosis: Within functional limits      Objective   Clinical Swallow Evaluation complete at bedside after clearance obtained by pt's nurse.  Pt was positioned upright in bed.  PO trials were provided to analyze swallow skills with various consistencies.    Pt was noted to safely swallow ice chips, which she chewed, and thin liquids via cup and straw.  No overt s/s aspiration observed.  Pt demonstrated adequate mastication and oral clearance with regular solids (marshall cracker).  Vocal quality was clear post swallow each trial provided.       Pain:  Pain Assessment: 0-10  Pain Score: 9  Pain Location: Arm  Pain Orientation: Right  Pain Interventions: Repositioned       Oral/Motor Assessment:  Oral Hygiene: WFL  Dentition: Adequate/Natural  Oral Motor: Within Functional Limits  Facial Symmetry: Within Functional Limits  Vocal Quality: Within Functional  Limits      Consistencies Trialed:  Consistencies Trialed: Yes  Consistencies Trialed: Thin (IDDSI Level 0) - Straw, Thin (IDDSI Level 0) - Cup, Ice Chips, Regular (IDDSI Level 7)      Clinical Observations:  Patient Positioning: Upright in Bed  Management of Oral Secretions: Adequate         Inpatient Education:    Individual(s) Educated: Patient, nurse Nan  Verbal Education : Diet recommendations, upright positioning, and pt's preferences for medication intake  Risk and Benefits Discussed with Patient/Caregiver/Other: yes  Patient/Caregiver Demonstrated Understanding: yes  Plan of Care Discussed and Agreed Upon: yes  Patient Response to Education: Patient/Caregiver Verbalized Understanding of Information

## 2023-12-21 NOTE — PROGRESS NOTES
CONSULT PROGRESS NOTES    SERVICE DATE: 12/21/2023   SERVICE TIME: 2:31 PM    CONSULTING SERVICE: Nephrology    ASSESSMENT AND PLAN   73-year-old with numerous medical problems including end-stage renal disease admitted for hypotension.  1.  End-stage renal disease  2.  Hypotension  3.  Fluid overload  4.  Anemia of chronic renal disease     I am obliging a thrice weekly hemodialysis schedule.  She now has an arterial line giving at least more seemingly accurate blood pressure readings and is on very low-dose of norepinephrine.  Continue to wean the pressors for a systolic blood pressure above 70, as she is not uncommonly in the 70 systolic on outpatient dialysis.  There is no role for hemodialysis today.  Anticipate hemodialysis tomorrow.  I am not inclined to use CRRT.  She seems to be mentating about her baseline.     Continue midodrine, continue low temperature dialysate, attempt 1 L volume removal tomorrow.  No growth as yet on blood cultures.      SUBJECTIVE  INTERVAL HPI: She states less diarrhea, continued cough, generally she feels okay.  Pressors have been weaned.  Every time the Levophed is totally eliminated, the blood pressure drops below 70 systolic.  She tolerated dialysis yesterday without remark.    MEDICATIONS:  apixaban, 2.5 mg, oral, BID  calcium acetate, 667 mg, oral, TID with meals  collagenase, , Topical, Daily  doxycycline, 100 mg, oral, Daily  epoetin di or biosimilar, 10,000 Units, intravenous, Every Mon/Wed/Fri  escitalopram, 10 mg, oral, Daily  febuxostat, 40 mg, oral, Every other day  fenofibrate, 160 mg, oral, Daily  fidaxomicin, 200 mg, oral, BID  gabapentin, 100 mg, oral, BID  heparin, 2,000 Units, intra-catheter, After Dialysis  heparin, 2,000 Units, intra-catheter, After Dialysis  insulin lispro, 0-5 Units, subcutaneous, TID with meals  ipratropium-albuteroL, 3 mL, nebulization, TID  [Held by provider] metoprolol succinate XL, 12.5 mg, oral, Daily  midodrine, 15 mg, oral, TID with  meals  nystatin, 1 Application, Topical, BID  simvastatin, 40 mg, oral, Nightly       norepinephrine, 0.01-3 mcg/kg/min, Last Rate: 0.01 mcg/kg/min (12/21/23 1429)       PRN medications: acetaminophen, acetaminophen, dextrose 10 % in water (D10W), dextrose, glucagon, HYDROcodone-acetaminophen, oxygen, polyethylene glycol, zinc oxide     OBJECTIVE  PHYSICAL EXAM:   Heart Rate:  []   Temp:  [35.6 °C (96.1 °F)-37.3 °C (99.1 °F)]   Resp:  [6-28]   BP: (77)/(34)   SpO2:  [89 %-100 %]   Body mass index is 28.42 kg/m².  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    DATA:   Labs:  Results for orders placed or performed during the hospital encounter of 12/06/23 (from the past 96 hour(s))   POCT GLUCOSE   Result Value Ref Range    POCT Glucose 179 (H) 74 - 99 mg/dL   CBC   Result Value Ref Range    WBC 12.7 (H) 4.4 - 11.3 x10*3/uL    nRBC 0.0 0.0 - 0.0 /100 WBCs    RBC 3.72 (L) 4.00 - 5.20 x10*6/uL    Hemoglobin 10.7 (L) 12.0 - 16.0 g/dL    Hematocrit 31.9 (L) 36.0 - 46.0 %    MCV 86 80 - 100 fL    MCH 28.8 26.0 - 34.0 pg    MCHC 33.5 32.0 - 36.0 g/dL    RDW 22.1 (H) 11.5 - 14.5 %    Platelets 116 (L) 150 - 450 x10*3/uL   POCT GLUCOSE   Result Value Ref Range    POCT Glucose 130 (H) 74 - 99 mg/dL   Renal function panel   Result Value Ref Range    Glucose 140 (H) 65 - 99 mg/dL    Sodium 135 133 - 145 mmol/L    Potassium 3.3 (L) 3.4 - 5.1 mmol/L    Chloride 99 97 - 107 mmol/L    Bicarbonate  23 (L) 24 - 31 mmol/L    Urea Nitrogen 25 8 - 25 mg/dL    Creatinine 3.60 (H) 0.40 - 1.60 mg/dL    eGFR 13 (L) >60 mL/min/1.73m*2    Calcium 9.0 8.5 - 10.4 mg/dL    Phosphorus 0.9 (L) 2.5 - 4.5 mg/dL    Albumin 3.1 (L) 3.5 - 5.0 g/dL    Anion Gap 13 <=19 mmol/L   POCT GLUCOSE   Result Value Ref Range    POCT Glucose 109 (H) 74 - 99 mg/dL   PST Top   Result Value Ref Range    Extra Tube Hold for add-ons.    Hepatitis B surface antigen   Result Value Ref Range    Hepatitis B Surface AG Nonreactive Nonreactive   Hepatitis B surface antibody   Result Value Ref Range    Hepatitis B Surface AB 5.1 <10.0 mIU/mL   POCT GLUCOSE   Result Value Ref Range    POCT Glucose 262 (H) 74 - 99 mg/dL   POCT GLUCOSE   Result Value Ref Range    POCT Glucose 86 74 - 99 mg/dL   Renal Function Panel   Result Value Ref Range    Glucose 126 (H) 65 - 99 mg/dL    Sodium 134 133 - 145 mmol/L    Potassium 3.1 (L) 3.4 - 5.1 mmol/L    Chloride 97 97 - 107 mmol/L    Bicarbonate 24 24 - 31 mmol/L    Urea Nitrogen 10 8 - 25 mg/dL    Creatinine 2.20 (H) 0.40 - 1.60 mg/dL    eGFR 23 (L) >60 mL/min/1.73m*2    Calcium 8.5 8.5 - 10.4 mg/dL    Phosphorus 0.5 (L) 2.5 - 4.5 mg/dL    Albumin 3.0 (L) 3.5 - 5.0 g/dL    Anion Gap 13 <=19 mmol/L   CBC and Auto Differential   Result Value Ref Range    WBC 11.8 (H) 4.4 - 11.3 x10*3/uL    nRBC 0.2 (H) 0.0 - 0.0 /100 WBCs    RBC 3.54 (L) 4.00 - 5.20 x10*6/uL    Hemoglobin 9.9 (L) 12.0 - 16.0 g/dL    Hematocrit 30.3 (L) 36.0 - 46.0 %    MCV 86 80 - 100 fL    MCH 28.0 26.0 - 34.0 pg    MCHC 32.7 32.0 - 36.0 g/dL    RDW 21.7 (H) 11.5 - 14.5 %    Platelets 114 (L) 150 - 450 x10*3/uL    Neutrophils % 60.9 40.0 - 80.0 %    Immature Granulocytes %, Automated 1.8 (H) 0.0 - 0.9 %    Lymphocytes % 23.3 13.0 - 44.0 %    Monocytes % 9.7 2.0 - 10.0 %    Eosinophils % 3.5 0.0 - 6.0 %    Basophils % 0.8 0.0 - 2.0 %    Neutrophils Absolute 7.20 (H) 1.60 - 5.50 x10*3/uL    Immature Granulocytes Absolute, Automated 0.21 0.00 - 0.50  x10*3/uL    Lymphocytes Absolute 2.76 0.80 - 3.00 x10*3/uL    Monocytes Absolute 1.15 (H) 0.05 - 0.80 x10*3/uL    Eosinophils Absolute 0.41 (H) 0.00 - 0.40 x10*3/uL    Basophils Absolute 0.10 0.00 - 0.10 x10*3/uL   Morphology   Result Value Ref Range    RBC Morphology See Below     Target Cells Few    POCT GLUCOSE   Result Value Ref Range    POCT Glucose 109 (H) 74 - 99 mg/dL   POCT GLUCOSE   Result Value Ref Range    POCT Glucose 124 (H) 74 - 99 mg/dL   POCT GLUCOSE   Result Value Ref Range    POCT Glucose 111 (H) 74 - 99 mg/dL   POCT GLUCOSE   Result Value Ref Range    POCT Glucose 149 (H) 74 - 99 mg/dL   POCT GLUCOSE   Result Value Ref Range    POCT Glucose 172 (H) 74 - 99 mg/dL   Renal Function Panel   Result Value Ref Range    Glucose 171 (H) 65 - 99 mg/dL    Sodium 136 133 - 145 mmol/L    Potassium 3.0 (L) 3.4 - 5.1 mmol/L    Chloride 98 97 - 107 mmol/L    Bicarbonate 25 24 - 31 mmol/L    Urea Nitrogen 17 8 - 25 mg/dL    Creatinine 3.30 (H) 0.40 - 1.60 mg/dL    eGFR 14 (L) >60 mL/min/1.73m*2    Calcium 8.6 8.5 - 10.4 mg/dL    Phosphorus 1.2 (L) 2.5 - 4.5 mg/dL    Albumin 2.9 (L) 3.5 - 5.0 g/dL    Anion Gap 13 <=19 mmol/L   CBC and Auto Differential   Result Value Ref Range    WBC 12.4 (H) 4.4 - 11.3 x10*3/uL    nRBC 0.2 (H) 0.0 - 0.0 /100 WBCs    RBC 3.51 (L) 4.00 - 5.20 x10*6/uL    Hemoglobin 10.0 (L) 12.0 - 16.0 g/dL    Hematocrit 29.2 (L) 36.0 - 46.0 %    MCV 83 80 - 100 fL    MCH 28.5 26.0 - 34.0 pg    MCHC 34.2 32.0 - 36.0 g/dL    RDW 22.5 (H) 11.5 - 14.5 %    Platelets 160 150 - 450 x10*3/uL    Neutrophils % 58.0 40.0 - 80.0 %    Immature Granulocytes %, Automated 1.9 (H) 0.0 - 0.9 %    Lymphocytes % 25.4 13.0 - 44.0 %    Monocytes % 8.8 2.0 - 10.0 %    Eosinophils % 4.8 0.0 - 6.0 %    Basophils % 1.1 0.0 - 2.0 %    Neutrophils Absolute 7.21 (H) 1.60 - 5.50 x10*3/uL    Immature Granulocytes Absolute, Automated 0.23 0.00 - 0.50 x10*3/uL    Lymphocytes Absolute 3.16 (H) 0.80 - 3.00 x10*3/uL    Monocytes  Absolute 1.09 (H) 0.05 - 0.80 x10*3/uL    Eosinophils Absolute 0.60 (H) 0.00 - 0.40 x10*3/uL    Basophils Absolute 0.14 (H) 0.00 - 0.10 x10*3/uL   Morphology   Result Value Ref Range    RBC Morphology See Below     Target Cells Few    POCT GLUCOSE   Result Value Ref Range    POCT Glucose 159 (H) 74 - 99 mg/dL   Cortisol   Result Value Ref Range    Cortisol 12.0 2.5 - 20.0 ug/dL   POCT GLUCOSE   Result Value Ref Range    POCT Glucose 114 (H) 74 - 99 mg/dL   Cortisol   Result Value Ref Range    Cortisol 23.4 (H) 2.5 - 20.0 ug/dL   Cortisol   Result Value Ref Range    Cortisol 26.3 (H) 2.5 - 20.0 ug/dL   POCT GLUCOSE   Result Value Ref Range    POCT Glucose 104 (H) 74 - 99 mg/dL   POCT GLUCOSE   Result Value Ref Range    POCT Glucose 102 (H) 74 - 99 mg/dL   Renal Function Panel   Result Value Ref Range    Glucose 127 (H) 65 - 99 mg/dL    Sodium 135 133 - 145 mmol/L    Potassium 3.7 3.4 - 5.1 mmol/L    Chloride 97 97 - 107 mmol/L    Bicarbonate 24 24 - 31 mmol/L    Urea Nitrogen 8 8 - 25 mg/dL    Creatinine 2.00 (H) 0.40 - 1.60 mg/dL    eGFR 26 (L) >60 mL/min/1.73m*2    Calcium 8.3 (L) 8.5 - 10.4 mg/dL    Phosphorus 1.0 (L) 2.5 - 4.5 mg/dL    Albumin 2.8 (L) 3.5 - 5.0 g/dL    Anion Gap 14 <=19 mmol/L   CBC and Auto Differential   Result Value Ref Range    WBC 14.2 (H) 4.4 - 11.3 x10*3/uL    nRBC 0.3 (H) 0.0 - 0.0 /100 WBCs    RBC 3.41 (L) 4.00 - 5.20 x10*6/uL    Hemoglobin 9.6 (L) 12.0 - 16.0 g/dL    Hematocrit 28.6 (L) 36.0 - 46.0 %    MCV 84 80 - 100 fL    MCH 28.2 26.0 - 34.0 pg    MCHC 33.6 32.0 - 36.0 g/dL    RDW 22.5 (H) 11.5 - 14.5 %    Platelets 160 150 - 450 x10*3/uL    Neutrophils % 64.2 40.0 - 80.0 %    Immature Granulocytes %, Automated 2.1 (H) 0.0 - 0.9 %    Lymphocytes % 21.1 13.0 - 44.0 %    Monocytes % 9.1 2.0 - 10.0 %    Eosinophils % 2.7 0.0 - 6.0 %    Basophils % 0.8 0.0 - 2.0 %    Neutrophils Absolute 9.11 (H) 1.60 - 5.50 x10*3/uL    Immature Granulocytes Absolute, Automated 0.30 0.00 - 0.50 x10*3/uL     Lymphocytes Absolute 2.99 0.80 - 3.00 x10*3/uL    Monocytes Absolute 1.29 (H) 0.05 - 0.80 x10*3/uL    Eosinophils Absolute 0.38 0.00 - 0.40 x10*3/uL    Basophils Absolute 0.11 (H) 0.00 - 0.10 x10*3/uL   POCT GLUCOSE   Result Value Ref Range    POCT Glucose 101 (H) 74 - 99 mg/dL   POCT GLUCOSE   Result Value Ref Range    POCT Glucose 189 (H) 74 - 99 mg/dL         SIGNATURE: Saeed oMndragon MD PATIENT NAME: Ayanna Gross   DATE: December 21, 2023 MRN: [id number]   TIME: 2:31 PM PAGER: [phone]

## 2023-12-22 NOTE — PROGRESS NOTES
Ayanna Gross is a 73 y.o. female on day 16 of admission presenting with Hypotension, unspecified hypotension type.      Subjective     Getting HD. No new complaints. Talking on phone. On Levophed 0.02mcg rate           Objective     Last Recorded Vitals  BP (!) 77/34   Pulse 86   Temp 35.7 °C (96.3 °F) (Oral)   Resp 19   Wt 69.5 kg (153 lb 3.5 oz)   SpO2 100%   Intake/Output last 3 Shifts:    Intake/Output Summary (Last 24 hours) at 12/22/2023 0854  Last data filed at 12/22/2023 0600  Gross per 24 hour   Intake 80.26 ml   Output 300 ml   Net -219.74 ml         Admission Weight  Weight: 72.6 kg (160 lb) (12/06/23 1301)    Daily Weight  12/22/23 : 69.5 kg (153 lb 3.5 oz)    Image Results      Physical Exam  Constitutional:       Appearance: Normal appearance. She is not toxic-appearing.   HENT:      Head: Normocephalic.      Right Ear: External ear normal.      Left Ear: External ear normal.   Eyes:      Conjunctiva/sclera: Conjunctivae normal.   Cardiovascular:      Rate and Rhythm: Normal rate.      Heart sounds: Normal heart sounds.   Pulmonary:      Effort: Pulmonary effort is normal.      Breath sounds: Normal breath sounds.   Abdominal:      General: Abdomen is flat.      Palpations: Abdomen is soft.   Skin:     General: Skin is warm.   Neurological:      General: No focal deficit present.      Mental Status: She is oriented to person, place, and time.   Psychiatric:         Mood and Affect: Mood normal.       General: alert, no diaphoresis   Lungs: CTA BL   Heart: RRR, no LE edema BL   GI: abdomen soft, nontender, nondistended, BS present   MSK: no joint effusion or deformity   Skin: no rashes, erythema, or ecchymosis   Neuro: grossly normal cognition, motor strength, sensation      Relevant Results               Scheduled medications  apixaban, 2.5 mg, oral, BID  calcium acetate, 667 mg, oral, TID with meals  collagenase, , Topical, Daily  doxycycline, 100 mg, oral, Daily  epoetin di or biosimilar,  10,000 Units, intravenous, Every Mon/Wed/Fri  escitalopram, 10 mg, oral, Daily  febuxostat, 40 mg, oral, Every other day  fenofibrate, 160 mg, oral, Daily  fidaxomicin, 200 mg, oral, BID  gabapentin, 100 mg, oral, BID  heparin, 2,000 Units, intra-catheter, After Dialysis  heparin, 2,000 Units, intra-catheter, After Dialysis  heparin, 2,000 Units, intra-catheter, After Dialysis  heparin, 2,000 Units, intra-catheter, After Dialysis  insulin lispro, 0-5 Units, subcutaneous, TID with meals  ipratropium-albuteroL, 3 mL, nebulization, TID  [Held by provider] metoprolol succinate XL, 12.5 mg, oral, Daily  midodrine, 15 mg, oral, TID with meals  nystatin, 1 Application, Topical, BID  simvastatin, 40 mg, oral, Nightly      Continuous medications  norepinephrine, 0.01-3 mcg/kg/min, Last Rate: 0.02 mcg/kg/min (12/22/23 0702)      PRN medications  PRN medications: acetaminophen, acetaminophen, dextrose 10 % in water (D10W), dextrose, glucagon, HYDROcodone-acetaminophen, oxygen, polyethylene glycol, zinc oxide    Results for orders placed or performed during the hospital encounter of 12/06/23 (from the past 24 hour(s))   POCT GLUCOSE   Result Value Ref Range    POCT Glucose 189 (H) 74 - 99 mg/dL   POCT GLUCOSE   Result Value Ref Range    POCT Glucose 160 (H) 74 - 99 mg/dL   POCT GLUCOSE   Result Value Ref Range    POCT Glucose 128 (H) 74 - 99 mg/dL   POCT GLUCOSE   Result Value Ref Range    POCT Glucose 122 (H) 74 - 99 mg/dL   POCT GLUCOSE   Result Value Ref Range    POCT Glucose 134 (H) 74 - 99 mg/dL   Renal Function Panel   Result Value Ref Range    Glucose 152 (H) 65 - 99 mg/dL    Sodium 139 133 - 145 mmol/L    Potassium 3.9 3.4 - 5.1 mmol/L    Chloride 100 97 - 107 mmol/L    Bicarbonate 28 24 - 31 mmol/L    Urea Nitrogen 14 8 - 25 mg/dL    Creatinine 2.90 (H) 0.40 - 1.60 mg/dL    eGFR 17 (L) >60 mL/min/1.73m*2    Calcium 8.6 8.5 - 10.4 mg/dL    Phosphorus 1.4 (L) 2.5 - 4.5 mg/dL    Albumin 2.8 (L) 3.5 - 5.0 g/dL    Anion Gap  11 <=19 mmol/L   CBC and Auto Differential   Result Value Ref Range    WBC 11.9 (H) 4.4 - 11.3 x10*3/uL    nRBC 0.3 (H) 0.0 - 0.0 /100 WBCs    RBC 3.16 (L) 4.00 - 5.20 x10*6/uL    Hemoglobin 9.4 (L) 12.0 - 16.0 g/dL    Hematocrit 27.7 (L) 36.0 - 46.0 %    MCV 88 80 - 100 fL    MCH 29.7 26.0 - 34.0 pg    MCHC 33.9 32.0 - 36.0 g/dL    RDW 23.6 (H) 11.5 - 14.5 %    Platelets 195 150 - 450 x10*3/uL    Neutrophils % 60.3 40.0 - 80.0 %    Immature Granulocytes %, Automated 1.3 (H) 0.0 - 0.9 %    Lymphocytes % 20.6 13.0 - 44.0 %    Monocytes % 10.1 2.0 - 10.0 %    Eosinophils % 6.7 0.0 - 6.0 %    Basophils % 1.0 0.0 - 2.0 %    Neutrophils Absolute 7.17 (H) 1.60 - 5.50 x10*3/uL    Immature Granulocytes Absolute, Automated 0.15 0.00 - 0.50 x10*3/uL    Lymphocytes Absolute 2.44 0.80 - 3.00 x10*3/uL    Monocytes Absolute 1.20 (H) 0.05 - 0.80 x10*3/uL    Eosinophils Absolute 0.79 (H) 0.00 - 0.40 x10*3/uL    Basophils Absolute 0.12 (H) 0.00 - 0.10 x10*3/uL   Morphology   Result Value Ref Range    RBC Morphology No significant RBC morphology present         Assessment/Plan        This patient has a central line   Reason for the central line remaining today? Parenteral medication            Principal Problem:    Hypotension, unspecified hypotension type  Active Problems:    Paroxysmal atrial fibrillation (CMS/HCC)    Peripheral vascular disease (CMS/HCC)    Orthostatic hypotension    Mixed hyperlipidemia    End-stage renal disease on hemodialysis (CMS/HCC)    DM (diabetes mellitus) (CMS/HCC)    Absent pedal pulses    Shock  - trend is towards improvement, albeit slow, and in picture of known chronic hypotension  -  suspect septic overall but could be element of hypovolemia/hypoalbuminemia  - on levophed, titrating for goal systolic in the 70s. Reamains on Levophed though at lowest rate.   - continue high dose midodrine per Dr. Haroldo Adams.   - Empiric Zosyn has been stopped by ID  - continue Dificid per ID    A  fibrillation  Continue low-dose beta blocker -on hold formally due to BP  Eliquis for stroke prophylaxis     COVID-19  - diagnosed 12/14  - ID on consult    Dysphagia  - passed eval with nursing last night - SLP formally today     Mixed hyperlipidemia  Continue statin, fenofibrate      DM (diabetes mellitus)   Hold Tradjenta   AC/HS glucose with SSI coverage   Hypoglycemia protocol      Groin excoriation   Wound care consult   Nystatin powder   Keep skin clean and dry   Sacrum covered with Mepilex.      C-diff   PCR positive and negative toxin.   Now on fidaxomicin    ESRD  HD per renal.         Malnutrition Diagnosis Status: Ongoing  Malnutrition Diagnosis: Moderate malnutrition related to acute disease or injury  As Evidenced by: 1-2% unintentional wt loss in 1 week (~4% in 2 weeks), Poor intake <75% of estimated energy requirement > 7days, and mild fat and muscle deficits  I agree with the dietitian's malnutrition diagnosis.       Needs continued ICU care for vasopressors and arterial line monitoring.              Daniel Rubio, DO

## 2023-12-22 NOTE — PROGRESS NOTES
FOLLOWUP FOR: Critical care management    SUBJECTIVE  Remains on 3 L nasal cannula.  Currently on dialysis.  Still requiring 0.01 Levophed    PHYSICAL EXAM        12/22/2023     6:45 AM 12/22/2023     7:00 AM 12/22/2023     7:02 AM 12/22/2023     7:15 AM 12/22/2023     7:30 AM 12/22/2023     7:45 AM 12/22/2023     8:00 AM   Vitals   Heart Rate 83 80 81 78 81 84 86   Resp 12 16 14 14 13 13 19       Vital signs reviewed   NAD, awake and alert, O2   Lungs Symmetric excursions.  Auscultation - diminished but clear, no wheezing/rales/rhonchi.     Cor RSR No murmur, rub, gallop   Abd soft and nontender, no rebound or distention, no HS megaly   Ext no CCE   Neuro no focal deficits.  moves all extremities symmetrically.  speech normal.  affect normal    LABS    Serum electrolytes are unremarkable except for creatinine 2.9 consistent with her end-stage renal disease.  White blood cell count 11.9.  H&H 9.4 and 27    ASSESSMENT:    .  Septic shock  .  COVID-19 infection  .  C. difficile colitis  .  End-stage renal disease  .  Possible endocarditis  .  Transudative pleural effusion    PLAN    .  Continue pressors, unable to clear completely off of pressors  .  No evidence for adrenal insufficiency  .  Dialysis  .  Oral vancomycin  .  Systemic antibiotics, infectious disease following  .  Bronchodilators and pulmonary hygiene    Moris Scott MD, [facility]

## 2023-12-22 NOTE — PROGRESS NOTES
Patient not medically clear. Patient remains in the ICU. Patient on levophed. Patient receiving dialysis. At this time the discharge plan is to go to [facility]. Therapy will need to work with patient. Precert not started on this day. Will follow.     **PATIENT DOES NOT HAVE A SAFE DISCHARGE PLAN      Shauna Jensen RN

## 2023-12-22 NOTE — PROGRESS NOTES
Ayanna Gross is a 73 y.o. female on day 16 of admission presenting with Hypotension, unspecified hypotension type.    Subjective   Interval History:   Room not entered-limit exposure   patient observed through glass window  Resting comfortably  Afebrile  On low-dose pressors      Review of Systems    Objective   Range of Vitals (last 24 hours)  Heart Rate:  [77-95]   Temp:  [35.7 °C (96.3 °F)-35.9 °C (96.6 °F)]   Resp:  [7-25]   Weight:  [69.5 kg (153 lb 3.5 oz)]   SpO2:  [92 %-100 %]   Daily Weight  12/22/23 : 69.5 kg (153 lb 3.5 oz)    Body mass index is 28.02 kg/m².    Physical Exam   resting comfortably    Antibiotics  sodium chloride 0.9 % bolus 500 mL  cefepime (Maxipime) 1 g in dextrose 5 % 50 mL IV  vancomycin-diluent combo no.1 (Xellia) IVPB 1 g  apixaban (Eliquis) tablet 2.5 mg  calcium acetate (Phoslo) capsule 2,000 mg  escitalopram (Lexapro) tablet 10 mg  febuxostat (Uloric) tablet 40 mg  fenofibrate (Triglide) tablet 160 mg  gabapentin (Neurontin) capsule 300 mg  metoprolol succinate XL (Toprol-XL) 24 hr tablet 12.5 mg  midodrine (Proamatine) tablet 10 mg  nystatin (Mycostatin) 100,000 unit/gram powder 1 Application  B complex-vitamin C tablet 1 tablet  simvastatin (Zocor) tablet 40 mg  acetaminophen (Tylenol) tablet 650 mg  acetaminophen (Tylenol) tablet 650 mg  polyethylene glycol (Glycolax, Miralax) packet 17 g  dextrose 50 % injection 25 g  glucagon (Glucagen) injection 1 mg  dextrose 10 % in water (D10W) infusion  insulin lispro (HumaLOG) injection 0-5 Units  zinc oxide 20 % ointment 1 Application  calcium acetate (Phoslo) capsule 667 mg  vancomycin (Vancocin) capsule 125 mg  lidocaine PF (Xylocaine) 10 mg/mL (1 %) injection  heparin 1,000 unit/mL injection 2,000 Units  heparin 1,000 unit/mL injection 2,000 Units  midodrine (Proamatine) tablet 15 mg  albumin human 25 % solution 25 g  polyethylene glycol (Glycolax, Miralax) packet 17 g  collagenase 250 unit/gram ointment  vancomycin (Vancocin)  1,750 mg in dextrose 5 % in water (D5W) 250 mL IV  vancomycin-diluent combo no.1 (Xellia) IVPB 1,750 mg  albumin human 25 % solution 12.5 g  heparin 1,000 unit/mL injection 2,000 Units  heparin 1,000 unit/mL injection 2,000 Units  vancomycin (Vancocin) placeholder  doxycycline (Vibramycin) capsule 100 mg  potassium chloride CR (Klor-Con) ER tablet 10 mEq  gabapentin (Neurontin) capsule 100 mg  heparin 1,000 unit/mL injection 2,000 Units  heparin 1,000 unit/mL injection 2,000 Units  piperacillin-tazobactam-dextrose (Zosyn) IV 2.25 g  doxycycline (Vibramycin) capsule 100 mg  ipratropium-albuteroL (Duo-Neb) 0.5-2.5 mg/3 mL nebulizer solution 3 mL  heparin 1,000 unit/mL injection 2,000 Units  heparin 1,000 unit/mL injection 2,000 Units  ipratropium-albuteroL (Duo-Neb) 0.5-2.5 mg/3 mL nebulizer solution 3 mL  dexAMETHasone (Decadron) injection 6 mg  albumin human 25 % solution 25 g  epoetin di-epbx (Retacrit) injection 10,000 Units  lidocaine PF (Xylocaine) 20 mg/mL (2 %) injection  potassium chloride CR (Klor-Con) ER tablet 10 mEq  albumin human 5 % infusion 25 g  potassium chloride CR (Klor-Con M20) ER tablet 20 mEq  norepinephrine (Levophed) 8 mg in dextrose 5% 250 mL (0.032 mg/mL) infusion (premix)  oxygen (O2) therapy  piperacillin-tazobactam-dextrose (Zosyn) IV 2.25 g  heparin 1,000 unit/mL injection 2,000 Units  heparin 1,000 unit/mL injection 2,000 Units  potassium chloride 20 mEq in 100 mL IV premix  fidaxomicin (Dificid) tablet 200 mg  norepinephrine (Levophed) 8 mg in dextrose 5% 250 mL (0.032 mg/mL) infusion (premix)  heparin 1,000 unit/mL injection 2,000 Units  heparin 1,000 unit/mL injection 2,000 Units  HYDROcodone-acetaminophen (Norco) 5-325 mg per tablet 1 tablet  dextrose 50 % injection 25 g  glucagon (Glucagen) injection 1 mg  dextrose 10 % in water (D10W) infusion  potassium chloride (Klor-Con) packet 20 mEq  potassium chloride 20 mEq in 100 mL IV premix  cosyntropin (Cortrosyn) injection 250  mcg  heparin 1,000 unit/mL injection 2,000 Units  heparin 1,000 unit/mL injection 2,000 Units  heparin 1,000 unit/mL injection 2,000 Units  heparin 1,000 unit/mL injection 2,000 Units      Relevant Results  Labs  Results from last 72 hours   Lab Units 12/22/23  0534 12/21/23  0451 12/20/23  0512   WBC AUTO x10*3/uL 11.9* 14.2* 12.4*   HEMOGLOBIN g/dL 9.4* 9.6* 10.0*   HEMATOCRIT % 27.7* 28.6* 29.2*   PLATELETS AUTO x10*3/uL 195 160 160   NEUTROS PCT AUTO % 60.3 64.2 58.0   LYMPHS PCT AUTO % 20.6 21.1 25.4   MONOS PCT AUTO % 10.1 9.1 8.8   EOS PCT AUTO % 6.7 2.7 4.8     Results from last 72 hours   Lab Units 12/22/23  0534 12/21/23  0451 12/20/23  0512   SODIUM mmol/L 139 135 136   POTASSIUM mmol/L 3.9 3.7 3.0*   CHLORIDE mmol/L 100 97 98   CO2 mmol/L 28 24 25   BUN mg/dL 14 8 17   CREATININE mg/dL 2.90* 2.00* 3.30*   GLUCOSE mg/dL 152* 127* 171*   CALCIUM mg/dL 8.6 8.3* 8.6   ANION GAP mmol/L 11 14 13   EGFR mL/min/1.73m*2 17* 26* 14*   PHOSPHORUS mg/dL 1.4* 1.0* 1.2*     Results from last 72 hours   Lab Units 12/22/23  0534 12/21/23 0451 12/20/23  0512   ALBUMIN g/dL 2.8* 2.8* 2.9*     Estimated Creatinine Clearance: 16.3 mL/min (A) (by C-G formula based on SCr of 2.9 mg/dL (H)).  CRP   Date Value Ref Range Status   06/23/2023 19.9 (H) 0 - 2.0 MG/DL Final     Comment:     Performed at [address] [address]   05/22/2022 1.0 0 - 2.0 MG/DL Final     Comment:     Performed at [address] [address]   12/13/2020 10.1 (H) 0 - 2.0 MG/DL Final     Comment:     Performed at [place] [address] [facility] [address]     Microbiology   reviewed  Imaging  US thoracentesis    Result Date: 12/20/2023  Interpreted By:  Todd La, STUDY: US THORACENTESIS;  12/15/2023 3:43 pm   INDICATION: Signs/Symptoms:L pleural effusion.   COMPARISON: Chest x-rayDated 12/14/2023.   ACCESSION NUMBER(S): [id number]   ORDERING CLINICIAN: PARIS LUCAS   TECHNIQUE: INTERVENTIONALIST(S):  Todd La M.D.   CONSENT: The patient/patient's POA/next of kin was informed of the nature of the proposed procedure. The purposes, alternatives, risks, and benefits were explained and discussed. All questions were answered and consent was obtained.   SEDATION: None   MEDICATION/CONTRAST: No additional   TIME OUT: A time out was performed immediately prior to procedure start with the interventional team, correctly identifying the patient name, date of birth, MRN, procedure, anatomy (including marking of site and side), patient position, procedure consent form, relevant laboratory and imaging test results, antibiotic administration, safety precautions, and procedure-specific equipment needs.   FINDINGS: The patient was placed in the sitting position.   The pleural space was examined with grey scale ultrasound, and the most accessible fluid identified and marked for thoracentesis.   The skin was prepped and draped in usual manner. Local anesthesia with Lidocaine was administered and a  left-sided thoracentesis was performed.  A 5 English One-Step thoracentesis needle/catheter was then placed where marked.  Approximately 600 mL of yellowish colored fluid was removed.  The needle/catheter was then withdrawn.   The patient tolerated the procedure well and there were no immediate complications. Specimen(s) sent to the laboratory and pathology for further evaluation, per the requesting team.       Uneventful  left-sided thoracentesis, as detailed above.   I personally performed and/or directly supervised this study and was present for the entire procedure.   I personally reviewed the study and resident interpretation. I agree with the findings as stated.   Performed and dictated at [facility].   MACRO: None   Signed by: Todd La 12/20/2023 9:48 PM Dictation workstation:   [id number]    XR chest 1 view    Result Date: 12/19/2023  Interpreted By:  Judd Lee, STUDY:  XR CHEST 1 VIEW; 12/19/2023 5:44 am   INDICATION: Signs/Symptoms:pleural effusion.   COMPARISON: December 14, 2023   ACCESSION NUMBER(S): [id number]   ORDERING CLINICIAN: PARIS LUCAS   FINDINGS: RESULT: Right sided central venous catheter is again noted extending into the right atrium. The distal tip is not well visualized. Left-sided vascular stents are noted in the left subclavian region. Left axillary surgical clips are noted. The cardiac silhouette is within normal limits for size. Mediastinal contours are unremarkable. Left basilar opacity obscures the left hemidiaphragm with hazy opacity throughout the left lung. There are degenerative changes of the thoracic spine and shoulders.       Persistent left basilar opacity obscures the left hemidiaphragm with hazy opacity extending through the left hemithorax. Findings likely represent pleural effusion atelectasis. Underlying infiltrate or mass is not excluded. Overall findings not significantly changed compared to previous exam     Signed by: Judd Lee 12/19/2023 9:02 AM Dictation workstation:   [id number]    XR chest 1 view    Result Date: 12/14/2023  Interpreted By:  Judd Lee, STUDY: XR CHEST 1 VIEW; 12/14/2023 10:43 am   INDICATION: Hypoxic respiratory failure.   COMPARISON: December 8, 2023   ACCESSION NUMBER(S): [id number]   ORDERING CLINICIAN: RODRI SURESH   FINDINGS: RESULT: Right-sided double-lumen dialysis catheter is noted with distal tip at the level of the right atrium. Vascular stents are noted in the left subclavian region. Surgical clips are noted in the left axilla. Cardiac silhouette size is indeterminate as the left heart border is obscured. There are calcifications involving the thoracic aorta. Left basilar obscurity obscures the left hemidiaphragm and left heart border with hazy opacity extending to the left mid lung. There are degenerative changes of the bilateral shoulders.       Increasing density in the left mid and  lower lung suggests increasing infiltrate or pleural effusion.     Signed by: Judd Lee 12/14/2023 10:58 AM Dictation workstation:   [id number]    Vascular US ankle brachial index (OCTAVIO) without exercise    Result Date: 12/13/2023           [place] [address]            Phone [phone]  Vascular Lab Report  [facility] US ANKLE BRACHIAL INDEX (OCTAVIO) WITHOUT EXERCISE Patient Name:      BASIA Gray Physician: [id number] Lizet Messer MD Study Date:        12/11/2023          Ordering Provider: [id number] MARIANN SUTTON MRN/PID:           [id number]            Fellow: Accession#:        [id number]        Technologist:      Luz Maria Trivedi RVT Date of Birth/Age: 1950 / 73 years Technologist 2:    Dara Parr RVT Gender:            F                   Encounter#:        [id number] Admission Status:  Inpatient           Location           [facility]                                        Performed:  Diagnosis/ICD: Other specified symptoms and signs involving the circulatory and                respiratory systems-R09.89 Indication:    Peripheral vascular disease CPT Codes:     54065.52 Peripheral artery OCTAVIO Only Reduced Service  Pertinent History: Absent pedal pulses. Patient is on dialysis.  CONCLUSIONS: Right Lower PVR: Evidence of moderate arterial occlusive disease in the right lower extremity at rest. Right pressures of >220 mmHg suggest no compressibility of vessels and may make absolute Segmental Limb Pressures (SLP) unreliable. Decreased digital perfusion noted. Biphasic flow is noted in the right dorsalis pedis artery. Left Lower PVR: Evidence of moderate arterial occlusive disease in the left lower extremity at rest. Left pressures of >220 mmHg suggest no compressibility of vessels and may make absolute Segmental Limb Pressures (SLP) unreliable. Decreased digital perfusion noted.  Biphasic flow is noted in the left dorsalis pedis artery. Additional Findings: Technically difficult and limited exam due to patient's positioning, movement and inability to cooperate with exam.  Imaging & Doppler Findings:  RIGHT Lower PVR              Pressures Right Dorsalis Pedis (Ankle) 255 mmHg   LEFT Lower PVR              Pressures Left Dorsalis Pedis (Ankle) 255 mmHg   [id number] Lizet Messer MD Electronically signed by [id number] Lizet Messer MD on 12/13/2023 at 8:38:56 AM  ** Final **     CT head wo IV contrast    Result Date: 12/11/2023  Interpreted By:  Brendon Hook, STUDY: CT HEAD WO IV CONTRAST; 12/11/2023 5:20 pm   INDICATION: Signs/Symptoms:acute confusion.   COMPARISON: 20 August 2023   ACCESSION NUMBER(S): [id number]   ORDERING CLINICIAN: RODRI SURESH   TECHNIQUE: CT was performed with one or more of the following dose reduction techniques: automated exposure control, adjustment of the mA and/or kV according to patient size, or use of iterative reconstruction technique.       PROCEDURE: 3.0 mm axial images were obtained through the brain, to include the posterior fossa without intravenous contrast enhancement.   FINDINGS: The patient's head is turned to the left and slightly canted in the gantry. Mild motion artifact is seen. There is symmetric volume loss of the cerebral hemispheres. Moderate  decreased attenuation in the bilateral periventricular white matter, consistent with microangiopathy is noted.  There is no encephalomalacic change. Brainstem is unremarkable. Cerebellar hemispheres are symmetric. No subarachnoid, intraparenchymal, subdural or interventricular hemorrhage. No intra- or extra-axial mass or abnormal blood products are demonstrated. Hyperostosis frontalis interna is seen. Slight hypoplasia of the left mastoid.   The paranasal sinuses and mastoids as well as calvarium are unremarkable.       1. No acute intracranial findings. 2. Symmetric volume loss of the cerebral hemispheres. 3.  Periventricular Microangiopathy. 4. No posttraumatic abnormality.   This report has been produced using speech recognition. This exam is available in DICOM format to non-affiliated healthcare facilities on a secure media free searchable basis with prior patient authorization. The patient exposure is reported to a radiation dose index registry. All CT examinations are performed with one or more of the following dose reduction techniques: Automated Exposure Control, Adjustment of mA and/or KV according to patient size, or use of iterative reconstruction techniques.   MACRO: None   Signed by: Brendon Hook 12/11/2023 7:05 PM Dictation workstation:   [id number]    US thoracentesis    Result Date: 12/8/2023  Interpreted By:  Sandro Hernandez, STUDY: US THORACENTESIS; 12/7/2023 3:16 pm   INDICATION: Signs/Symptoms:Left pleural effusion question of whether this needs thoracentesis, history of ESRD, just had dialysis today.   COMPARISON: None   ACCESSION NUMBER(S): [id number]   ORDERING CLINICIAN: JEZ LONG   TECHNIQUE: Informed consent obtained. Patient positionedsitting upright. Skin prepped, draped and anesthetized. Under ultrasound guidance, a centesis catheter/needle was advanced into rightpleural cavity.   FINDINGS: A total of 750 cc of clear yellow fluid was aspirated. A sample was sent for analysis. The patient tolerated the procedure well.       Ultrasound-guided left thoracentesis.     Signed by: Sandro Hernandez 12/8/2023 3:01 PM Dictation workstation:   [id number]    XR chest 1 view    Result Date: 12/8/2023  Interpreted By:  Maria Garcia, STUDY: XR CHEST 1 VIEW 12/8/2023 11:57 am   INDICATION: Signs/Symptoms:S/P thoracentesis   COMPARISON: 12/06/2023   ACCESSION NUMBER(S): [id number]   ORDERING CLINICIAN: JEZ LONG   TECHNIQUE: AP erect view of the chest at bedside   FINDINGS: There is an interval decrease in size of left pleural effusion when compared with the study done 2 days earlier. No  pneumothorax is seen.   There is some residual left pleural effusion with infiltrates seen in the left mid and lower lung field. The right lung is clear.   There are surgical clips within the left axilla. Stent grafts are visible in the left subclavian region extending into the left axillary region. A tunneled right jugular dialysis catheter terminates within right atrium.       Decreased size of left effusion following ultrasound-guided thoracentesis with no pneumothorax.   There is some residual left pleural effusion noted with infiltrate in the left mid and lower lung field.   Signed by: Maria Garcia 12/8/2023 12:51 PM Dictation workstation:   [id number]    XR foot 3+ views bilateral    Result Date: 12/7/2023  Interpreted By:  Brendon Hook, STUDY: XR FOOT 3+ VIEWS BILATERAL; 12/7/2023 3:31 pm   INDICATION: Signs/Symptoms:Wound/Abscess/Acute neha process rule out/Comparative Exam.   COMPARISON: None   ACCESSION NUMBER(S): [id number]   ORDERING CLINICIAN: JOSSELYN STAHL   TECHNIQUE: Nonweightbearing AP, lateral and oblique views of the bilateral foot were performed.   FINDINGS: Right foot: Large heel spur is seen. Mild Achilles enthesopathy demonstrated. Moderate atherosclerotic disease is seen. There is suggestion of loss of the arch on this nonweightbearing view. Degenerative change anterior plafond demonstrated and dorsal talus as well as posterior plafond and dorsal talus. Sclerosis anterior calcaneus is seen. Degenerative changes mild between the midfoot and forefoot. 1st metatarsophalangeal joint degenerative changes demonstrated. 2nd ray absent phalanges. Distal phalanges of the 3rd through 5th rays with overlap were difficult to visualize. Distal phalanx of the great toe is difficult to visualize.   Left foot: Large heel spur is seen. Advanced atherosclerotic disease demonstrated. Degenerative change in the calcaneus and talus seen with moderate anterior and mild posterior plafond degenerative change.  Mild degenerative change in the midfoot and forefoot is seen. Distal phalanx of the great toe is absent. Degenerative change of the 2nd ray phalanges and 3rd ray phalanges seen with degenerative change of the 4th and 5th digits not well seen.       1. Right foot: Large heel spur. Atherosclerotic disease. Suggestion of diffuse osteopenia. No sclerosis to suggest osteomyelitis. No subcutaneous air to suggest gangrene. Absent flanges 2nd ray with difficult to visualized distal phalanges of the other digits.   2. Left foot: Large heel spur. Degenerative changes of the hindfoot midfoot and forefoot with atherosclerotic disease. Absent distal phalanx of the great toe. No periostitis or abnormal sclerosis with diffuse osteopenia to suggest osteomyelitis. No subcutaneous air to suggest gangrene.       Signed by: Brendon Hook 12/7/2023 8:39 PM Dictation workstation:   [id number]    ECG 12 Lead    Result Date: 12/7/2023   Poor data quality, interpretation may be adversely affected Normal sinus rhythm Right bundle branch block Septal infarct , age undetermined Possible Lateral infarct , age undetermined Abnormal ECG No previous ECGs available Confirmed by Hammad Callahan ([id number]) on 12/7/2023 1:07:11 PM    XR chest 1 view    Result Date: 12/6/2023  Interpreted By:  Roger Murdock, STUDY: XR CHEST 1 VIEW;  12/6/2023 2:08 pm   INDICATION: Signs/Symptoms:Brief AMS and hypotension, now resolved.   COMPARISON: 08/21/2023..   ACCESSION NUMBER(S): [id number]   ORDERING CLINICIAN: PATEL TINSLEY   FINDINGS: New moderate left pleural effusion. There is silhouetting of the left heart border. Left subclavian stent present. Right-sided double-lumen central venous catheter terminates within the right ventricle. No acute osseous findings.       New, moderate left pleural effusion.     MACRO: None   Signed by: Roger Murdock 12/6/2023 2:18 PM Dictation workstation:   [id number]     Assessment/Plan     Shock-etiology unclear-on low-dose  pressors  Unstageable Bilateral heel ulcers  C-difficile infection-resolving  Type 2 diabetes mellitus with peripheral angiopathy with gangrene   acute respiratory failure-resolved  Coronavirus infection  Pleural effusion, s/p thoracentesis  History of chronic MRSA bacteremia, infective endocarditis-completed IV vancomycin, now on Doxycycline   ESRD on hemodialysis  Severe malnutrition-prealbumin of 3.2           Continue Dificid  Oral doxycycline as suppressive therapy for MRSA bacteremia with possible infective endocarditis  Monitor temperature  and WBC  Local care  Offloading  High-protein diet  Monitor stool  Podiatry follow up  Wean off pressors   droplet plus precautions  Contact plus precautions       Stephen Coulter MD

## 2023-12-22 NOTE — PROGRESS NOTES
Ayanna Gross is a 73 y.o. female on day 16 of admission presenting with Hypotension, unspecified hypotension type.      Subjective   -       Objective     Last Recorded Vitals  BP (!) 77/34   Pulse 88   Temp 35.4 °C (95.7 °F)   Resp 19   Wt 69.5 kg (153 lb 3.5 oz)   SpO2 95%   Intake/Output last 3 Shifts:    Intake/Output Summary (Last 24 hours) at 12/22/2023 1651  Last data filed at 12/22/2023 1600  Gross per 24 hour   Intake 1024.46 ml   Output 1900 ml   Net -875.54 ml       Admission Weight  Weight: 72.6 kg (160 lb) (12/06/23 1301)    Daily Weight  12/22/23 : 69.5 kg (153 lb 3.5 oz)    Image Results      Physical Exam    Relevant Results               Assessment/Plan                  Principal Problem:    Hypotension, unspecified hypotension type  Active Problems:    Paroxysmal atrial fibrillation (CMS/HCC)    Peripheral vascular disease (CMS/HCC)    Orthostatic hypotension    Mixed hyperlipidemia    End-stage renal disease on hemodialysis (CMS/HCC)    DM (diabetes mellitus) (CMS/HCC)    Absent pedal pulses                  Daniel Rubio DO

## 2023-12-22 NOTE — CARE PLAN
The patient's goals for the shift include keep comfortable.     The clinical goals for the shift include wean levophed while maintaining hemodynamic stability.       Problem: Diabetes  Goal: Achieve decreasing blood glucose levels by end of shift  Outcome: Progressing  Goal: Increase stability of blood glucose readings by end of shift  Outcome: Progressing  Goal: Maintain electrolyte levels within acceptable range throughout shift  Outcome: Progressing  Goal: Maintain glucose levels >70mg/dl to <250mg/dl throughout shift  Outcome: Progressing  Goal: No changes in neurological exam by end of shift  Outcome: Progressing  Goal: Learn about and adhere to nutrition recommendations by end of shift  Outcome: Progressing  Goal: Vital signs within normal range for age by end of shift  Outcome: Progressing  Goal: Increase self care and/or family involovement by end of shift  Outcome: Progressing  Goal: Receive DSME education by end of shift  Outcome: Progressing     Problem: Pain - Adult  Goal: Verbalizes/displays adequate comfort level or baseline comfort level  Outcome: Progressing     Problem: Safety - Adult  Goal: Free from fall injury  Outcome: Progressing     Problem: Discharge Planning  Goal: Discharge to home or other facility with appropriate resources  Outcome: Progressing     Problem: Chronic Conditions and Co-morbidities  Goal: Patient's chronic conditions and co-morbidity symptoms are monitored and maintained or improved  Outcome: Progressing     Problem: Skin  Goal: Decreased wound size/increased tissue granulation at next dressing change  Outcome: Progressing  Flowsheets (Taken 12/22/2023 1022)  Decreased wound size/increased tissue granulation at next dressing change:   Promote sleep for wound healing   Utilize specialty bed per algorithm   Protective dressings over bony prominences  Goal: Participates in plan/prevention/treatment measures  Outcome: Progressing  Flowsheets (Taken 12/22/2023 1022)  Participates  in plan/prevention/treatment measures: Elevate heels  Goal: Prevent/manage excess moisture  Outcome: Progressing  Flowsheets (Taken 12/22/2023 1022)  Prevent/manage excess moisture:   Cleanse incontinence/protect with barrier cream   Moisturize dry skin   Monitor for/manage infection if present   Follow provider orders for dressing changes  Goal: Prevent/minimize sheer/friction injuries  Outcome: Progressing  Flowsheets (Taken 12/22/2023 1022)  Prevent/minimize sheer/friction injuries:   Complete micro-shifts as needed if patient unable. Adjust patient position to relieve pressure points, not a full turn   HOB 30 degrees or less   Turn/reposition every 2 hours/use positioning/transfer devices   Use pull sheet   Utilize specialty bed per algorithm  Goal: Promote/optimize nutrition  Outcome: Progressing  Flowsheets (Taken 12/22/2023 1022)  Promote/optimize nutrition:   Assist with feeding   Monitor/record intake including meals  Goal: Promote skin healing  Outcome: Progressing  Flowsheets (Taken 12/22/2023 1022)  Promote skin healing:   Assess skin/pad under line(s)/device(s)   Ensure correct size (line/device) and apply per  instructions   Protective dressings over bony prominences   Rotate device position/do not position patient on device   Turn/reposition every 2 hours/use positioning/transfer devices     Problem: Fall/Injury  Goal: Not fall by end of shift  Outcome: Progressing  Goal: Be free from injury by end of the shift  Outcome: Progressing  Goal: Verbalize understanding of personal risk factors for fall in the hospital  Outcome: Progressing  Goal: Verbalize understanding of risk factor reduction measures to prevent injury from fall in the home  Outcome: Progressing  Goal: Pace activities to prevent fatigue by end of the shift  Outcome: Progressing     Problem: Nutrition  Goal: Oral intake greater than 50%  Outcome: Progressing  Goal: Oral intake greater 75%  Outcome: Progressing  Goal: Consume  prescribed supplement  Outcome: Progressing  Goal: Adequate PO fluid intake  Outcome: Progressing  Goal: Nutrition support goals are met within 48 hrs  Outcome: Progressing  Goal: Nutrition support is meeting 75% of nutrient needs  Outcome: Progressing  Goal: Lab values WNL  Outcome: Progressing  Goal: Electrolytes WNL  Outcome: Progressing  Goal: Promote healing  Outcome: Progressing  Goal: Maintain stable weight  Outcome: Progressing  Goal: Reduce weight from edema/fluid  Outcome: Progressing  Goal: Gradual weight gain  Outcome: Progressing     Problem: Respiratory  Goal: Clear secretions with interventions this shift  Outcome: Progressing  Goal: Minimize anxiety/maximize coping throughout shift  Outcome: Progressing  Goal: Minimal/no exertional discomfort or dyspnea this shift  Outcome: Progressing  Goal: No signs of respiratory distress (eg. Use of accessory muscles. Peds grunting)  Outcome: Progressing  Goal: Patent airway maintained this shift  Outcome: Progressing  Goal: Verbalize decreased shortness of breath this shift  Outcome: Progressing  Goal: Wean oxygen to maintain O2 saturation per order/standard this shift  Outcome: Progressing     Problem: Pain  Goal: My pain/discomfort is manageable  Outcome: Progressing     Problem: Safety  Goal: Patient will be injury free during hospitalization  Outcome: Progressing  Goal: I will remain free of falls  Outcome: Progressing     Problem: Daily Care  Goal: Daily care needs are met  Outcome: Progressing     Problem: Psychosocial Needs  Goal: Demonstrates ability to cope with hospitalization/illness  Outcome: Progressing  Goal: Collaborate with me, my family, and caregiver to identify my specific goals  Outcome: Progressing     Problem: Discharge Barriers  Goal: My discharge needs are met  Outcome: Progressing     Problem: Pain  Goal: Takes deep breaths with improved pain control throughout the shift  Outcome: Progressing  Goal: Turns in bed with improved pain  control throughout the shift  Outcome: Progressing  Goal: Walks with improved pain control throughout the shift  Outcome: Progressing  Goal: Performs ADL's with improved pain control throughout shift  Outcome: Progressing  Goal: Participates in PT with improved pain control throughout the shift  Outcome: Progressing  Goal: Free from opioid side effects throughout the shift  Outcome: Progressing  Goal: Free from acute confusion related to pain meds throughout the shift  Outcome: Progressing

## 2023-12-22 NOTE — PROGRESS NOTES
"Nutrition Follow up Note    Nutrition Assessment      Patient in Covid19+ isolation, C-diff positive as well. Still on 3L NC, receiving dialysis at time of visit. Diet advanced to regular diet. RN states po intake is \"fair\". Will provide Mighty Shakes TID and Christian BID for wound healing.    Nutrition History:     Energy Intake: Fair 50-75 %  Food Allergies/Intolerances:  None  GI Symptoms: Diarrhea  Oral Problems: None    Anthropometrics:  Ht: 157.5 cm (5' 2.01\"), Wt: 69.5 kg (153 lb 3.5 oz), BMI: 28.02  IBW/kg (Dietitian Calculated): 50 kg  Percent of IBW: 145 %  Adjusted Body Weight (kg): 55.7 kg    Weight Change:  Weight History / % Weight Change: Pt unsure of any weight changes, but reports her #. Possible 7# weight loss in 2 weeks.  Significant Weight Loss: Yes     Nutrition Focused Physical Exam Findings:   Subcutaneous Fat Loss  Buccal Fat Pads: Well nourished (full, rounded cheeks)  Triceps: Mild-moderate (less than ample fat tissue)    Muscle Wasting  Temporalis: Well nourished (well-defined muscle)  Pectoralis (Clavicular Region): Well nourished (clavicle not visible)  Deltoid/Trapezius: Mild-Moderate (slight protrusion of acromion process)         Physical Findings (Nutrition Deficiency/Toxicity)  Skin:  (bilateral diabetic ulcer non-healing wounds, groin bilateral wound, and buttocks bilateral wound (12/6))    Nutrition Significant Labs:  Lab Results   Component Value Date    WBC 11.9 (H) 12/22/2023    HGB 9.4 (L) 12/22/2023    HCT 27.7 (L) 12/22/2023     12/22/2023    CHOL 104 (L) 07/01/2023    TRIG 155 (H) 07/01/2023    HDL 30 (L) 07/01/2023    ALT 21 12/07/2023    AST 40 12/07/2023     12/22/2023    K 3.9 12/22/2023     12/22/2023    CREATININE 2.90 (H) 12/22/2023    BUN 14 12/22/2023    CO2 28 12/22/2023    TSH 1.56 02/04/2020    INR 1.1 08/20/2023    HGBA1C 8.8 (H) 06/25/2023    ALBUR 1,315 (H) 02/24/2019       Current Facility-Administered Medications:     acetaminophen " (Tylenol) tablet 650 mg, 650 mg, oral, q4h PRN, MADHURI Carrasquillo-CNP    acetaminophen (Tylenol) tablet 650 mg, 650 mg, oral, q4h PRN, MADHURI Carrasquillo-CNP, 650 mg at 12/16/23 2021    apixaban (Eliquis) tablet 2.5 mg, 2.5 mg, oral, BID, MADHURI Carrasquillo-CNP, 2.5 mg at 12/22/23 0851    calcium acetate (Phoslo) capsule 667 mg, 667 mg, oral, TID with meals, MADHURI Carrasquillo-CNP, 667 mg at 12/22/23 1133    collagenase 250 unit/gram ointment, , Topical, Daily, LILLIE Carrasquillo, Given at 12/21/23 0950    dextrose 10 % in water (D10W) infusion, 0.3 g/kg/hr, intravenous, Once PRN, Dhiraj De La Garza MD    dextrose 50 % injection 25 g, 25 g, intravenous, q15 min PRN, Dhiraj De La Garza MD    doxycycline (Vibramycin) capsule 100 mg, 100 mg, oral, Daily, MADHURI Carrasquillo-CNP, 100 mg at 12/22/23 0851    epoetin di-epbx (Retacrit) injection 10,000 Units, 10,000 Units, intravenous, Every Mon/Wed/Fri, MADHURI Carrasquillo-CNP, 10,000 Units at 12/22/23 1156    escitalopram (Lexapro) tablet 10 mg, 10 mg, oral, Daily, MADHURI Carrasquillo-CNP, 10 mg at 12/22/23 0851    febuxostat (Uloric) tablet 40 mg, 40 mg, oral, Every other day, MADHURI Carrasquillo-CNP, 40 mg at 12/21/23 0815    fenofibrate (Triglide) tablet 160 mg, 160 mg, oral, Daily, MADHURI Carrasquillo-CNP, 160 mg at 12/22/23 0851    fidaxomicin (Dificid) tablet 200 mg, 200 mg, oral, BID, Stephen Coulter MD, 200 mg at 12/22/23 0854    gabapentin (Neurontin) capsule 100 mg, 100 mg, oral, BID, Inez Josue, APRN-CNP, 100 mg at 12/22/23 0851    glucagon (Glucagen) injection 1 mg, 1 mg, intramuscular, q15 min PRN, Dhiraj De La Garza MD    heparin 1,000 unit/mL injection 2,000 Units, 2,000 Units, intra-catheter, After Dialysis, Saeed Mondragon MD, 1,600 Units at 12/20/23 1320    heparin 1,000 unit/mL injection 2,000 Units, 2,000 Units, intra-catheter, After Dialysis, Saeed Mondragon MD, 1,600 Units at 12/20/23 1320     heparin 1,000 unit/mL injection 2,000 Units, 2,000 Units, intra-catheter, After Dialysis, Saeed Mondragon MD    heparin 1,000 unit/mL injection 2,000 Units, 2,000 Units, intra-catheter, After Dialysis, Saeed Mondragon MD, 3,200 Units at 12/22/23 1155    HYDROcodone-acetaminophen (Norco) 5-325 mg per tablet 1 tablet, 1 tablet, oral, q6h PRN, Daniel Rubio DO, 1 tablet at 12/20/23 2345    insulin lispro (HumaLOG) injection 0-5 Units, 0-5 Units, subcutaneous, TID with meals, LILLIE Carrasquillo, 1 Units at 12/21/23 1648    ipratropium-albuteroL (Duo-Neb) 0.5-2.5 mg/3 mL nebulizer solution 3 mL, 3 mL, nebulization, TID, LILLIE Carrasquillo, 3 mL at 12/22/23 1200    [Held by provider] metoprolol succinate XL (Toprol-XL) 24 hr tablet 12.5 mg, 12.5 mg, oral, Daily, LILLIE Carrasquillo, 12.5 mg at 12/13/23 1327    midodrine (Proamatine) tablet 15 mg, 15 mg, oral, TID with meals, LILLIE Carrasquillo, 15 mg at 12/22/23 1132    norepinephrine (Levophed) 8 mg in dextrose 5% 250 mL (0.032 mg/mL) infusion (premix), 0.01-3 mcg/kg/min, intravenous, Continuous, Saeed Mondragon MD, Last Rate: 1.33 mL/hr at 12/22/23 0730, 0.01 mcg/kg/min at 12/22/23 0730    nystatin (Mycostatin) 100,000 unit/gram powder 1 Application, 1 Application, Topical, BID, LILLIE Carrasquillo, 1 Application at 12/22/23 0851    oxygen (O2) therapy, , inhalation, Continuous PRN - O2/gases, Klaudia Figueroa DO, 3 L/min at 12/22/23 0800    polyethylene glycol (Glycolax, Miralax) packet 17 g, 17 g, oral, Daily PRN, LILLIE Carrasquillo    simvastatin (Zocor) tablet 40 mg, 40 mg, oral, Nightly, LILLIE Carrasquillo, 40 mg at 12/21/23 2053    zinc oxide 20 % ointment 1 Application, 1 Application, Topical, q1h PRN, LILLIE Carrasquillo    Dietary Orders (From admission, onward)       Start     Ordered    12/22/23 1224  Oral nutritional supplements  Until discontinued        Comments: Vanilla    Question Answer Comment   Deliver with All meals    Select supplement: Sugar Free Mighty Shake        12/22/23 1223    12/22/23 1224  Oral nutritional supplements  Until discontinued        Comments: Fruit punch mixed with water   Question Answer Comment   Deliver with Breakfast    Deliver with Dinner    Select supplement: Christian        12/22/23 1224    12/21/23 1330  Adult diet Regular  Diet effective now        Question:  Diet type  Answer:  Regular    12/21/23 1329                   Estimated Needs:   Estimated Energy Needs  Total Energy Estimated Needs (kCal):  (5050-5148 kcals)  Total Estimated Energy Need per Day (kCal/kg):  (30-35 kcals/kg)  Method for Estimating Needs: adjusted wt    Estimated Protein Needs  Total Protein Estimated Needs (g):  (67-84)  Total Protein Estimated Needs (g/kg):  (1.2-1.5)  Method for Estimating Needs: adjusted wt    Estimated Fluid Needs  Total Fluid Estimated Needs (mL):  (7909-2410 mL)  Method for Estimating Needs: UO + 500-1000 ml      Nutrition Diagnosis   Nutrition Diagnosis:  Malnutrition Diagnosis  Patient has Malnutrition Diagnosis: Yes  Diagnosis Status: Ongoing  Malnutrition Diagnosis: Moderate malnutrition related to acute disease or injury  As Evidenced by: 1-2% unintentional wt loss in 1 week (~4% in 2 weeks), Poor intake <75% of estimated energy requirement > 7days, and mild fat and muscle deficits    Nutrition Diagnosis  Patient has Nutrition Diagnosis: Yes  Diagnosis Status (1): Ongoing  Nutrition Diagnosis 1: Increased nutrient needs  Related to (1): increased demand for nutrient  As Evidenced by (1): diabetic ulcers and wounds     Nutrition Interventions/Recommendations   Nutrition Interventions and Recommendations:    Nutrition Prescription:  Individualized Nutrition Prescription Provided for : 5948-9442 kcal and 67-84g protein provided via renal diet and supplements    Nutrition Interventions:   Food and/or Nutrient Delivery Interventions  Interventions:  Meals and snacks  Meals and Snacks: General healthful diet  Goal: Provide as ordered  Medical Food Supplement: Commercial beverage, Modified beverage  Goal: mighty shake TID to provide 200 kcals and 7 g protein per carton, Christian BID to provide 90 calories, 2.5 gm protein each    Education Documentation  No documentation found.         Nutrition Monitoring and Evaluation   Monitoring/Evaluation:   Food/Nutrient Related History Monitoring  Monitoring and Evaluation Plan: Energy intake  Energy Intake: Estimated energy intake  Criteria: Pt will tolerate >75% of estimated energy needs  Fluid Intake: Estimated fluid intake  Criteria: Pt will tolerate >75% of supplements    Body Composition/Growth/Weight History  Monitoring and Evaluation Plan: Weight  Weight: Measured weight  Criteria: Pt will maintain wt    Nutrition Focused Physical Findings  Monitoring and Evaluation Plan: Skin  Skin: Impaired wound healing  Criteria: Pt will maintain/show signs of improvement in skin integrity       Time Spent/Follow-up:   Follow Up  Time Spent (min): 30 minutes  Last Date of Nutrition Visit: 12/22/23  Nutrition Follow-Up Needed?: 5-7 days  Follow up Comment: 12/27/23

## 2023-12-22 NOTE — CARE PLAN
The patient's goals for the shift include rest    The clinical goals for the shift include remain hemodynmaically stable      Problem: Diabetes  Goal: Achieve decreasing blood glucose levels by end of shift  Outcome: Progressing  Goal: Increase stability of blood glucose readings by end of shift  Outcome: Progressing  Goal: Maintain electrolyte levels within acceptable range throughout shift  Outcome: Progressing  Goal: Maintain glucose levels >70mg/dl to <250mg/dl throughout shift  Outcome: Progressing  Goal: No changes in neurological exam by end of shift  Outcome: Progressing  Goal: Learn about and adhere to nutrition recommendations by end of shift  Outcome: Progressing  Goal: Vital signs within normal range for age by end of shift  Outcome: Progressing  Goal: Increase self care and/or family involovement by end of shift  Outcome: Progressing  Goal: Receive DSME education by end of shift  Outcome: Progressing     Problem: Pain - Adult  Goal: Verbalizes/displays adequate comfort level or baseline comfort level  Outcome: Progressing     Problem: Safety - Adult  Goal: Free from fall injury  Outcome: Progressing     Problem: Discharge Planning  Goal: Discharge to home or other facility with appropriate resources  Outcome: Progressing     Problem: Chronic Conditions and Co-morbidities  Goal: Patient's chronic conditions and co-morbidity symptoms are monitored and maintained or improved  Outcome: Progressing     Problem: Skin  Goal: Decreased wound size/increased tissue granulation at next dressing change  Outcome: Progressing  Flowsheets (Taken 12/21/2023 2315)  Decreased wound size/increased tissue granulation at next dressing change:   Promote sleep for wound healing   Protective dressings over bony prominences  Goal: Participates in plan/prevention/treatment measures  Outcome: Progressing  Flowsheets (Taken 12/21/2023 2315)  Participates in plan/prevention/treatment measures:   Discuss with provider PT/OT  consult   Elevate heels  Goal: Prevent/manage excess moisture  Outcome: Progressing  Flowsheets (Taken 12/21/2023 2315)  Prevent/manage excess moisture:   Cleanse incontinence/protect with barrier cream   Follow provider orders for dressing changes   Monitor for/manage infection if present  Goal: Prevent/minimize sheer/friction injuries  Outcome: Progressing  Flowsheets (Taken 12/21/2023 2315)  Prevent/minimize sheer/friction injuries:   Complete micro-shifts as needed if patient unable. Adjust patient position to relieve pressure points, not a full turn   Increase activity/out of bed for meals   Use pull sheet   Turn/reposition every 2 hours/use positioning/transfer devices  Goal: Promote/optimize nutrition  Outcome: Progressing  Flowsheets (Taken 12/21/2023 2315)  Promote/optimize nutrition:   Assist with feeding   Consume > 50% meals/supplements   Offer water/supplements/favorite foods   Monitor/record intake including meals  Goal: Promote skin healing  Outcome: Progressing  Flowsheets (Taken 12/21/2023 2315)  Promote skin healing:   Assess skin/pad under line(s)/device(s)   Protective dressings over bony prominences   Turn/reposition every 2 hours/use positioning/transfer devices     Problem: Fall/Injury  Goal: Not fall by end of shift  Outcome: Progressing  Goal: Be free from injury by end of the shift  Outcome: Progressing  Goal: Verbalize understanding of personal risk factors for fall in the hospital  Outcome: Progressing  Goal: Verbalize understanding of risk factor reduction measures to prevent injury from fall in the home  Outcome: Progressing  Goal: Pace activities to prevent fatigue by end of the shift  Outcome: Progressing     Problem: Nutrition  Goal: Oral intake greater than 50%  Outcome: Progressing  Goal: Oral intake greater 75%  Outcome: Progressing  Goal: Consume prescribed supplement  Outcome: Progressing  Goal: Adequate PO fluid intake  Outcome: Progressing  Goal: Nutrition support goals are  met within 48 hrs  Outcome: Progressing  Goal: Nutrition support is meeting 75% of nutrient needs  Outcome: Progressing  Goal: Lab values WNL  Outcome: Progressing  Goal: Electrolytes WNL  Outcome: Progressing  Goal: Promote healing  Outcome: Progressing  Goal: Maintain stable weight  Outcome: Progressing  Goal: Reduce weight from edema/fluid  Outcome: Progressing  Goal: Gradual weight gain  Outcome: Progressing     Problem: Respiratory  Goal: Clear secretions with interventions this shift  Outcome: Progressing  Goal: Minimize anxiety/maximize coping throughout shift  Outcome: Progressing  Goal: Minimal/no exertional discomfort or dyspnea this shift  Outcome: Progressing  Goal: No signs of respiratory distress (eg. Use of accessory muscles. Peds grunting)  Outcome: Progressing  Goal: Patent airway maintained this shift  Outcome: Progressing  Goal: Verbalize decreased shortness of breath this shift  Outcome: Progressing  Goal: Wean oxygen to maintain O2 saturation per order/standard this shift  Outcome: Progressing     Problem: Pain  Goal: My pain/discomfort is manageable  Outcome: Progressing     Problem: Safety  Goal: Patient will be injury free during hospitalization  Outcome: Progressing  Goal: I will remain free of falls  Outcome: Progressing     Problem: Daily Care  Goal: Daily care needs are met  Outcome: Progressing     Problem: Psychosocial Needs  Goal: Demonstrates ability to cope with hospitalization/illness  Outcome: Progressing  Goal: Collaborate with me, my family, and caregiver to identify my specific goals  Outcome: Progressing     Problem: Discharge Barriers  Goal: My discharge needs are met  Outcome: Progressing     Problem: Pain  Goal: Takes deep breaths with improved pain control throughout the shift  Outcome: Progressing  Goal: Turns in bed with improved pain control throughout the shift  Outcome: Progressing  Goal: Walks with improved pain control throughout the shift  Outcome:  Progressing  Goal: Performs ADL's with improved pain control throughout shift  Outcome: Progressing  Goal: Participates in PT with improved pain control throughout the shift  Outcome: Progressing  Goal: Free from opioid side effects throughout the shift  Outcome: Progressing  Goal: Free from acute confusion related to pain meds throughout the shift  Outcome: Progressing

## 2023-12-23 NOTE — NURSING NOTE
[facility] called.  Information given.  [facility] released the body.  Reference number [id number]

## 2023-12-23 NOTE — CARE PLAN
The patient's goals for the shift include rest    The clinical goals for the shift include wean levophed and remain hemodynmically stable      Problem: Diabetes  Goal: Achieve decreasing blood glucose levels by end of shift  Outcome: Progressing  Goal: Increase stability of blood glucose readings by end of shift  Outcome: Progressing  Goal: Maintain electrolyte levels within acceptable range throughout shift  Outcome: Progressing  Goal: Maintain glucose levels >70mg/dl to <250mg/dl throughout shift  Outcome: Progressing  Goal: No changes in neurological exam by end of shift  Outcome: Progressing  Goal: Learn about and adhere to nutrition recommendations by end of shift  Outcome: Progressing  Goal: Vital signs within normal range for age by end of shift  Outcome: Progressing  Goal: Increase self care and/or family involovement by end of shift  Outcome: Progressing  Goal: Receive DSME education by end of shift  Outcome: Progressing     Problem: Pain - Adult  Goal: Verbalizes/displays adequate comfort level or baseline comfort level  Outcome: Progressing     Problem: Safety - Adult  Goal: Free from fall injury  Outcome: Progressing     Problem: Discharge Planning  Goal: Discharge to home or other facility with appropriate resources  Outcome: Progressing     Problem: Chronic Conditions and Co-morbidities  Goal: Patient's chronic conditions and co-morbidity symptoms are monitored and maintained or improved  Outcome: Progressing     Problem: Skin  Goal: Decreased wound size/increased tissue granulation at next dressing change  Outcome: Progressing  Flowsheets (Taken 12/22/2023 2343)  Decreased wound size/increased tissue granulation at next dressing change:   Promote sleep for wound healing   Protective dressings over bony prominences  Goal: Participates in plan/prevention/treatment measures  Outcome: Progressing  Flowsheets (Taken 12/22/2023 2343)  Participates in plan/prevention/treatment measures:   Discuss with  provider PT/OT consult   Elevate heels  Goal: Prevent/manage excess moisture  Outcome: Progressing  Flowsheets (Taken 12/22/2023 2343)  Prevent/manage excess moisture:   Cleanse incontinence/protect with barrier cream   Follow provider orders for dressing changes   Monitor for/manage infection if present  Goal: Prevent/minimize sheer/friction injuries  Outcome: Progressing  Flowsheets (Taken 12/22/2023 2343)  Prevent/minimize sheer/friction injuries:   Complete micro-shifts as needed if patient unable. Adjust patient position to relieve pressure points, not a full turn   Increase activity/out of bed for meals   Use pull sheet  Goal: Promote/optimize nutrition  Outcome: Progressing  Flowsheets (Taken 12/22/2023 2343)  Promote/optimize nutrition:   Assist with feeding   Consume > 50% meals/supplements   Monitor/record intake including meals   Offer water/supplements/favorite foods  Goal: Promote skin healing  Outcome: Progressing  Flowsheets (Taken 12/22/2023 2343)  Promote skin healing:   Assess skin/pad under line(s)/device(s)   Protective dressings over bony prominences   Turn/reposition every 2 hours/use positioning/transfer devices     Problem: Fall/Injury  Goal: Not fall by end of shift  Outcome: Progressing  Goal: Be free from injury by end of the shift  Outcome: Progressing  Goal: Verbalize understanding of personal risk factors for fall in the hospital  Outcome: Progressing  Goal: Verbalize understanding of risk factor reduction measures to prevent injury from fall in the home  Outcome: Progressing  Goal: Pace activities to prevent fatigue by end of the shift  Outcome: Progressing     Problem: Nutrition  Goal: Oral intake greater than 50%  Outcome: Progressing  Goal: Oral intake greater 75%  Outcome: Progressing  Goal: Consume prescribed supplement  Outcome: Progressing  Goal: Adequate PO fluid intake  Outcome: Progressing  Goal: Nutrition support goals are met within 48 hrs  Outcome: Progressing  Goal:  Nutrition support is meeting 75% of nutrient needs  Outcome: Progressing  Goal: Lab values WNL  Outcome: Progressing  Goal: Electrolytes WNL  Outcome: Progressing  Goal: Promote healing  Outcome: Progressing  Goal: Maintain stable weight  Outcome: Progressing  Goal: Reduce weight from edema/fluid  Outcome: Progressing  Goal: Gradual weight gain  Outcome: Progressing     Problem: Respiratory  Goal: Clear secretions with interventions this shift  Outcome: Progressing  Goal: Minimize anxiety/maximize coping throughout shift  Outcome: Progressing  Goal: Minimal/no exertional discomfort or dyspnea this shift  Outcome: Progressing  Goal: No signs of respiratory distress (eg. Use of accessory muscles. Peds grunting)  Outcome: Progressing  Goal: Patent airway maintained this shift  Outcome: Progressing  Goal: Verbalize decreased shortness of breath this shift  Outcome: Progressing  Goal: Wean oxygen to maintain O2 saturation per order/standard this shift  Outcome: Progressing     Problem: Pain  Goal: My pain/discomfort is manageable  Outcome: Progressing     Problem: Safety  Goal: Patient will be injury free during hospitalization  Outcome: Progressing  Goal: I will remain free of falls  Outcome: Progressing     Problem: Daily Care  Goal: Daily care needs are met  Outcome: Progressing     Problem: Psychosocial Needs  Goal: Demonstrates ability to cope with hospitalization/illness  Outcome: Progressing  Goal: Collaborate with me, my family, and caregiver to identify my specific goals  Outcome: Progressing     Problem: Discharge Barriers  Goal: My discharge needs are met  Outcome: Progressing     Problem: Pain  Goal: Takes deep breaths with improved pain control throughout the shift  Outcome: Progressing  Goal: Turns in bed with improved pain control throughout the shift  Outcome: Progressing  Goal: Walks with improved pain control throughout the shift  Outcome: Progressing  Goal: Performs ADL's with improved pain control  throughout shift  Outcome: Progressing  Goal: Participates in PT with improved pain control throughout the shift  Outcome: Progressing  Goal: Free from opioid side effects throughout the shift  Outcome: Progressing  Goal: Free from acute confusion related to pain meds throughout the shift  Outcome: Progressing

## 2023-12-23 NOTE — CARE PLAN
The patient's goals for the shift include rest    The clinical goals for the shift include wean levophed and stable vitals  Nakita Watson RN

## 2023-12-23 NOTE — PROGRESS NOTES
Ayanna Gross is a 73 y.o. female on day 17 of admission presenting with Hypotension, unspecified hypotension type.      Subjective   No new overnight events.  No dyspnea at rest.  Lowish BP noted.       Scheduled medications  apixaban, 2.5 mg, oral, BID  calcium acetate, 667 mg, oral, TID with meals  collagenase, , Topical, Daily  doxycycline, 100 mg, oral, Daily  epoetin di or biosimilar, 10,000 Units, intravenous, Every Mon/Wed/Fri  escitalopram, 10 mg, oral, Daily  febuxostat, 40 mg, oral, Every other day  fenofibrate, 160 mg, oral, Daily  fidaxomicin, 200 mg, oral, BID  gabapentin, 100 mg, oral, BID  heparin, 2,000 Units, intra-catheter, After Dialysis  heparin, 2,000 Units, intra-catheter, After Dialysis  heparin, 2,000 Units, intra-catheter, After Dialysis  heparin, 2,000 Units, intra-catheter, After Dialysis  insulin lispro, 0-5 Units, subcutaneous, TID with meals  ipratropium-albuteroL, 3 mL, nebulization, TID  [Held by provider] metoprolol succinate XL, 12.5 mg, oral, Daily  midodrine, 15 mg, oral, TID with meals  nystatin, 1 Application, Topical, BID  simvastatin, 40 mg, oral, Nightly      Continuous medications  norepinephrine, 0.01-3 mcg/kg/min, Last Rate: 0.06 mcg/kg/min (12/23/23 1130)      PRN medications  PRN medications: acetaminophen, acetaminophen, dextrose 10 % in water (D10W), dextrose, glucagon, HYDROcodone-acetaminophen, oxygen, polyethylene glycol, zinc oxide      Objective      Vitals 24HR  Heart Rate:  []   Temp:  [35.4 °C (95.7 °F)-36.3 °C (97.4 °F)]   Resp:  [9-28]   Weight:  [69.5 kg (153 lb 3.5 oz)]   SpO2:  [80 %-100 %]     General: elderly woman, not in distress  Access: R internal jugular TDC  Lungs: decreased BS  Heart: S1 and S2, regular  Abdomen: soft, non tender  Extremities: no pedal edema  Neuro: sleeping but arousable    Intake/Output last 3 Shifts:    Intake/Output Summary (Last 24 hours) at 12/23/2023 1132  Last data filed at 12/23/2023 1130  Gross per 24 hour    Intake 1123.24 ml   Output 1600 ml   Net -476.76 ml       Relevant Results    Results for orders placed or performed during the hospital encounter of 12/06/23 (from the past 24 hour(s))   POCT GLUCOSE   Result Value Ref Range    POCT Glucose 64 (L) 74 - 99 mg/dL   POCT GLUCOSE   Result Value Ref Range    POCT Glucose 86 74 - 99 mg/dL   POCT GLUCOSE   Result Value Ref Range    POCT Glucose 244 (H) 74 - 99 mg/dL   POCT GLUCOSE   Result Value Ref Range    POCT Glucose 139 (H) 74 - 99 mg/dL   Renal function panel   Result Value Ref Range    Glucose 119 (H) 65 - 99 mg/dL    Sodium 135 133 - 145 mmol/L    Potassium 3.6 3.4 - 5.1 mmol/L    Chloride 97 97 - 107 mmol/L    Bicarbonate 28 24 - 31 mmol/L    Urea Nitrogen 11 8 - 25 mg/dL    Creatinine 2.50 (H) 0.40 - 1.60 mg/dL    eGFR 20 (L) >60 mL/min/1.73m*2    Calcium 8.1 (L) 8.5 - 10.4 mg/dL    Phosphorus 1.0 (L) 2.5 - 4.5 mg/dL    Albumin 2.6 (L) 3.5 - 5.0 g/dL    Anion Gap 10 <=19 mmol/L   CBC and Auto Differential   Result Value Ref Range    WBC 12.6 (H) 4.4 - 11.3 x10*3/uL    nRBC 0.4 (H) 0.0 - 0.0 /100 WBCs    RBC 2.93 (L) 4.00 - 5.20 x10*6/uL    Hemoglobin 8.9 (L) 12.0 - 16.0 g/dL    Hematocrit 26.0 (L) 36.0 - 46.0 %    MCV 89 80 - 100 fL    MCH 30.4 26.0 - 34.0 pg    MCHC 34.2 32.0 - 36.0 g/dL    RDW 23.8 (H) 11.5 - 14.5 %    Platelets 175 150 - 450 x10*3/uL    Neutrophils % 62.5 40.0 - 80.0 %    Immature Granulocytes %, Automated 1.1 (H) 0.0 - 0.9 %    Lymphocytes % 21.9 13.0 - 44.0 %    Monocytes % 8.6 2.0 - 10.0 %    Eosinophils % 5.0 0.0 - 6.0 %    Basophils % 0.9 0.0 - 2.0 %    Neutrophils Absolute 7.86 (H) 1.60 - 5.50 x10*3/uL    Immature Granulocytes Absolute, Automated 0.14 0.00 - 0.50 x10*3/uL    Lymphocytes Absolute 2.75 0.80 - 3.00 x10*3/uL    Monocytes Absolute 1.08 (H) 0.05 - 0.80 x10*3/uL    Eosinophils Absolute 0.63 (H) 0.00 - 0.40 x10*3/uL    Basophils Absolute 0.11 (H) 0.00 - 0.10 x10*3/uL   Morphology   Result Value Ref Range    RBC Morphology See  Below     Polychromasia Mild     Target Cells Few     Basophilic Stippling Present     Pappenheimer Bodies Present    POCT GLUCOSE   Result Value Ref Range    POCT Glucose 106 (H) 74 - 99 mg/dL          Assessment/Plan      ESRD on HD  COVID-19 infection  Hypotension  Anemia in CKD, on BIB    Last dialyzed yesterday, and will have next HD tomorrow.    Continue other care.      Raj Mcgee MD

## 2023-12-23 NOTE — PROGRESS NOTES
Ayanna Gross is a 73 y.o. female on day 17 of admission presenting with Hypotension, unspecified hypotension type.    Subjective   Interval History:       Patient seen and examined  Has fecal management system in place stool forming   On low-dose pressors  Awake, alert    Review of Systems   Gastrointestinal:  Positive for diarrhea.   All other systems reviewed and are negative.      Objective   Range of Vitals (last 24 hours)  Heart Rate:  []   Temp:  [35.4 °C (95.7 °F)-36.3 °C (97.4 °F)]   Resp:  [9-28]   SpO2:  [80 %-100 %]   Daily Weight  12/22/23 : 69.5 kg (153 lb 3.5 oz)    Body mass index is 28.02 kg/m².    Physical Exam  Constitutional:       Appearance: Normal appearance.   HENT:      Head: Normocephalic and atraumatic.      Nose: Nose normal.   Eyes:      Extraocular Movements: Extraocular movements intact.      Conjunctiva/sclera: Conjunctivae normal.   Cardiovascular:      Rate and Rhythm: Regular rhythm.      Heart sounds: Normal heart sounds.   Pulmonary:      Breath sounds: Decreased breath sounds present.   Abdominal:      General: Bowel sounds are normal.      Palpations: Abdomen is soft.   Skin:     Comments: Bilateral heel eschar    Neurological:      Mental Status: She is alert and oriented to person, place, and time.           Antibiotics  sodium chloride 0.9 % bolus 500 mL  cefepime (Maxipime) 1 g in dextrose 5 % 50 mL IV  vancomycin-diluent combo no.1 (Xellia) IVPB 1 g  apixaban (Eliquis) tablet 2.5 mg  calcium acetate (Phoslo) capsule 2,000 mg  escitalopram (Lexapro) tablet 10 mg  febuxostat (Uloric) tablet 40 mg  fenofibrate (Triglide) tablet 160 mg  gabapentin (Neurontin) capsule 300 mg  metoprolol succinate XL (Toprol-XL) 24 hr tablet 12.5 mg  midodrine (Proamatine) tablet 10 mg  nystatin (Mycostatin) 100,000 unit/gram powder 1 Application  B complex-vitamin C tablet 1 tablet  simvastatin (Zocor) tablet 40 mg  acetaminophen (Tylenol) tablet 650 mg  acetaminophen (Tylenol) tablet  650 mg  polyethylene glycol (Glycolax, Miralax) packet 17 g  dextrose 50 % injection 25 g  glucagon (Glucagen) injection 1 mg  dextrose 10 % in water (D10W) infusion  insulin lispro (HumaLOG) injection 0-5 Units  zinc oxide 20 % ointment 1 Application  calcium acetate (Phoslo) capsule 667 mg  vancomycin (Vancocin) capsule 125 mg  lidocaine PF (Xylocaine) 10 mg/mL (1 %) injection  heparin 1,000 unit/mL injection 2,000 Units  heparin 1,000 unit/mL injection 2,000 Units  midodrine (Proamatine) tablet 15 mg  albumin human 25 % solution 25 g  polyethylene glycol (Glycolax, Miralax) packet 17 g  collagenase 250 unit/gram ointment  vancomycin (Vancocin) 1,750 mg in dextrose 5 % in water (D5W) 250 mL IV  vancomycin-diluent combo no.1 (Xellia) IVPB 1,750 mg  albumin human 25 % solution 12.5 g  heparin 1,000 unit/mL injection 2,000 Units  heparin 1,000 unit/mL injection 2,000 Units  vancomycin (Vancocin) placeholder  doxycycline (Vibramycin) capsule 100 mg  potassium chloride CR (Klor-Con) ER tablet 10 mEq  gabapentin (Neurontin) capsule 100 mg  heparin 1,000 unit/mL injection 2,000 Units  heparin 1,000 unit/mL injection 2,000 Units  piperacillin-tazobactam-dextrose (Zosyn) IV 2.25 g  doxycycline (Vibramycin) capsule 100 mg  ipratropium-albuteroL (Duo-Neb) 0.5-2.5 mg/3 mL nebulizer solution 3 mL  heparin 1,000 unit/mL injection 2,000 Units  heparin 1,000 unit/mL injection 2,000 Units  ipratropium-albuteroL (Duo-Neb) 0.5-2.5 mg/3 mL nebulizer solution 3 mL  dexAMETHasone (Decadron) injection 6 mg  albumin human 25 % solution 25 g  epoetin di-epbx (Retacrit) injection 10,000 Units  lidocaine PF (Xylocaine) 20 mg/mL (2 %) injection  potassium chloride CR (Klor-Con) ER tablet 10 mEq  albumin human 5 % infusion 25 g  potassium chloride CR (Klor-Con M20) ER tablet 20 mEq  norepinephrine (Levophed) 8 mg in dextrose 5% 250 mL (0.032 mg/mL) infusion (premix)  oxygen (O2) therapy  piperacillin-tazobactam-dextrose (Zosyn) IV 2.25  g  heparin 1,000 unit/mL injection 2,000 Units  heparin 1,000 unit/mL injection 2,000 Units  potassium chloride 20 mEq in 100 mL IV premix  fidaxomicin (Dificid) tablet 200 mg  norepinephrine (Levophed) 8 mg in dextrose 5% 250 mL (0.032 mg/mL) infusion (premix)  heparin 1,000 unit/mL injection 2,000 Units  heparin 1,000 unit/mL injection 2,000 Units  HYDROcodone-acetaminophen (Norco) 5-325 mg per tablet 1 tablet  dextrose 50 % injection 25 g  glucagon (Glucagen) injection 1 mg  dextrose 10 % in water (D10W) infusion  potassium chloride (Klor-Con) packet 20 mEq  potassium chloride 20 mEq in 100 mL IV premix  cosyntropin (Cortrosyn) injection 250 mcg  heparin 1,000 unit/mL injection 2,000 Units  heparin 1,000 unit/mL injection 2,000 Units  heparin 1,000 unit/mL injection 2,000 Units  heparin 1,000 unit/mL injection 2,000 Units      Relevant Results  Labs  Results from last 72 hours   Lab Units 12/23/23  0941 12/22/23  0534 12/21/23  0451   WBC AUTO x10*3/uL 12.6* 11.9* 14.2*   HEMOGLOBIN g/dL 8.9* 9.4* 9.6*   HEMATOCRIT % 26.0* 27.7* 28.6*   PLATELETS AUTO x10*3/uL 175 195 160   NEUTROS PCT AUTO % 62.5 60.3 64.2   LYMPHS PCT AUTO % 21.9 20.6 21.1   MONOS PCT AUTO % 8.6 10.1 9.1   EOS PCT AUTO % 5.0 6.7 2.7     Results from last 72 hours   Lab Units 12/23/23  0941 12/22/23  0534 12/21/23  0451   SODIUM mmol/L 135 139 135   POTASSIUM mmol/L 3.6 3.9 3.7   CHLORIDE mmol/L 97 100 97   CO2 mmol/L 28 28 24   BUN mg/dL 11 14 8   CREATININE mg/dL 2.50* 2.90* 2.00*   GLUCOSE mg/dL 119* 152* 127*   CALCIUM mg/dL 8.1* 8.6 8.3*   ANION GAP mmol/L 10 11 14   EGFR mL/min/1.73m*2 20* 17* 26*   PHOSPHORUS mg/dL 1.0* 1.4* 1.0*     Results from last 72 hours   Lab Units 12/23/23  0941 12/22/23  0534 12/21/23  0451   ALBUMIN g/dL 2.6* 2.8* 2.8*     Estimated Creatinine Clearance: 18.9 mL/min (A) (by C-G formula based on SCr of 2.5 mg/dL (H)).  CRP   Date Value Ref Range Status   06/23/2023 19.9 (H) 0 - 2.0 MG/DL Final     Comment:      Performed at [address] [address]   05/22/2022 1.0 0 - 2.0 MG/DL Final     Comment:     [facility] [place] [address] [facility] [address]   12/13/2020 10.1 (H) 0 - 2.0 MG/DL Final     Comment:     Performed at [address] [address]     Microbiology  Reviewed  Imaging  US thoracentesis    Result Date: 12/20/2023  Interpreted By:  Todd La, STUDY: US THORACENTESIS;  12/15/2023 3:43 pm   INDICATION: Signs/Symptoms:L pleural effusion.   COMPARISON: Chest x-rayDated 12/14/2023.   ACCESSION NUMBER(S): [id number]   ORDERING CLINICIAN: PARIS LUCAS   TECHNIQUE: INTERVENTIONALIST(S): Todd La M.D.   CONSENT: The patient/patient's POA/next of kin was informed of the nature of the proposed procedure. The purposes, alternatives, risks, and benefits were explained and discussed. All questions were answered and consent was obtained.   SEDATION: None   MEDICATION/CONTRAST: No additional   TIME OUT: A time out was performed immediately prior to procedure start with the interventional team, correctly identifying the patient name, date of birth, MRN, procedure, anatomy (including marking of site and side), patient position, procedure consent form, relevant laboratory and imaging test results, antibiotic administration, safety precautions, and procedure-specific equipment needs.   FINDINGS: The patient was placed in the sitting position.   The pleural space was examined with grey scale ultrasound, and the most accessible fluid identified and marked for thoracentesis.   The skin was prepped and draped in usual manner. Local anesthesia with Lidocaine was administered and a  left-sided thoracentesis was performed.  A 5 Welsh One-Step thoracentesis needle/catheter was then placed where marked.  Approximately 600 mL of yellowish colored fluid was removed.  The needle/catheter was then withdrawn.   The patient tolerated the procedure well and there were no  immediate complications. Specimen(s) sent to the laboratory and pathology for further evaluation, per the requesting team.       Uneventful  left-sided thoracentesis, as detailed above.   I personally performed and/or directly supervised this study and was present for the entire procedure.   I personally reviewed the study and resident interpretation. I agree with the findings as stated.   Performed and dictated at [facility].   MACRO: None   Signed by: Todd La 12/20/2023 9:48 PM Dictation workstation:   [id number]    XR chest 1 view    Result Date: 12/19/2023  Interpreted By:  Judd Lee, STUDY: XR CHEST 1 VIEW; 12/19/2023 5:44 am   INDICATION: Signs/Symptoms:pleural effusion.   COMPARISON: December 14, 2023   ACCESSION NUMBER(S): [id number]   ORDERING CLINICIAN: PARIS LUCAS   FINDINGS: RESULT: Right sided central venous catheter is again noted extending into the right atrium. The distal tip is not well visualized. Left-sided vascular stents are noted in the left subclavian region. Left axillary surgical clips are noted. The cardiac silhouette is within normal limits for size. Mediastinal contours are unremarkable. Left basilar opacity obscures the left hemidiaphragm with hazy opacity throughout the left lung. There are degenerative changes of the thoracic spine and shoulders.       Persistent left basilar opacity obscures the left hemidiaphragm with hazy opacity extending through the left hemithorax. Findings likely represent pleural effusion atelectasis. Underlying infiltrate or mass is not excluded. Overall findings not significantly changed compared to previous exam     Signed by: Judd Lee 12/19/2023 9:02 AM Dictation workstation:   [id number]    XR chest 1 view    Result Date: 12/14/2023  Interpreted By:  Judd Lee, STUDY: XR CHEST 1 VIEW; 12/14/2023 10:43 am   INDICATION: Hypoxic respiratory failure.   COMPARISON: December 8, 2023    ACCESSION NUMBER(S): [id number]   ORDERING CLINICIAN: RODRI SURESH   FINDINGS: RESULT: Right-sided double-lumen dialysis catheter is noted with distal tip at the level of the right atrium. Vascular stents are noted in the left subclavian region. Surgical clips are noted in the left axilla. Cardiac silhouette size is indeterminate as the left heart border is obscured. There are calcifications involving the thoracic aorta. Left basilar obscurity obscures the left hemidiaphragm and left heart border with hazy opacity extending to the left mid lung. There are degenerative changes of the bilateral shoulders.       Increasing density in the left mid and lower lung suggests increasing infiltrate or pleural effusion.     Signed by: Judd Lee 12/14/2023 10:58 AM Dictation workstation:   [id number]    Vascular US ankle brachial index (OCTAVIO) without exercise    Result Date: 12/13/2023           [place] [address]            Phone [phone]  Vascular Lab Report  VAS US ANKLE BRACHIAL INDEX (OCTAVIO) WITHOUT EXERCISE Patient Name:      BASIA Gray Physician: [id number] Lizet Messer MD Study Date:        12/11/2023          Ordering Provider: [id number] MARIANN SUTTON MRN/PID:           [id number]            Fellow: Accession#:        [id number]        Technologist:      Luz Maria Trivedi RVT Date of Birth/Age: 1950 / 73 years Technologist 2:    Dara Parr RVT Gender:            F                   Encounter#:        [id number] Admission Status:  Inpatient           Location           [facility]                                        Performed:  Diagnosis/ICD: Other specified symptoms and signs involving the circulatory and                respiratory systems-R09.89 Indication:    Peripheral vascular disease CPT Codes:     46470.52 Peripheral artery OCTAVIO Only Reduced Service  Pertinent History:  Absent pedal pulses. Patient is on dialysis.  CONCLUSIONS: Right Lower PVR: Evidence of moderate arterial occlusive disease in the right lower extremity at rest. Right pressures of >220 mmHg suggest no compressibility of vessels and may make absolute Segmental Limb Pressures (SLP) unreliable. Decreased digital perfusion noted. Biphasic flow is noted in the right dorsalis pedis artery. Left Lower PVR: Evidence of moderate arterial occlusive disease in the left lower extremity at rest. Left pressures of >220 mmHg suggest no compressibility of vessels and may make absolute Segmental Limb Pressures (SLP) unreliable. Decreased digital perfusion noted. Biphasic flow is noted in the left dorsalis pedis artery. Additional Findings: Technically difficult and limited exam due to patient's positioning, movement and inability to cooperate with exam.  Imaging & Doppler Findings:  RIGHT Lower PVR              Pressures Right Dorsalis Pedis (Ankle) 255 mmHg   LEFT Lower PVR              Pressures Left Dorsalis Pedis (Ankle) 255 mmHg   [id number] Lizet Messer MD Electronically signed by [id number] Lizet Messer MD on 12/13/2023 at 8:38:56 AM  ** Final **     CT head wo IV contrast    Result Date: 12/11/2023  Interpreted By:  Brendon Hook, STUDY: CT HEAD WO IV CONTRAST; 12/11/2023 5:20 pm   INDICATION: Signs/Symptoms:acute confusion.   COMPARISON: 20 August 2023   ACCESSION NUMBER(S): [id number]   ORDERING CLINICIAN: RODRI SURESH   TECHNIQUE: CT was performed with one or more of the following dose reduction techniques: automated exposure control, adjustment of the mA and/or kV according to patient size, or use of iterative reconstruction technique.       PROCEDURE: 3.0 mm axial images were obtained through the brain, to include the posterior fossa without intravenous contrast enhancement.   FINDINGS: The patient's head is turned to the left and slightly canted in the gantry. Mild motion artifact is seen. There is symmetric volume loss of  the cerebral hemispheres. Moderate  decreased attenuation in the bilateral periventricular white matter, consistent with microangiopathy is noted.  There is no encephalomalacic change. Brainstem is unremarkable. Cerebellar hemispheres are symmetric. No subarachnoid, intraparenchymal, subdural or interventricular hemorrhage. No intra- or extra-axial mass or abnormal blood products are demonstrated. Hyperostosis frontalis interna is seen. Slight hypoplasia of the left mastoid.   The paranasal sinuses and mastoids as well as calvarium are unremarkable.       1. No acute intracranial findings. 2. Symmetric volume loss of the cerebral hemispheres. 3. Periventricular Microangiopathy. 4. No posttraumatic abnormality.   This report has been produced using speech recognition. This exam is available in DICOM format to non-affiliated healthcare facilities on a secure media free searchable basis with prior patient authorization. The patient exposure is reported to a radiation dose index registry. All CT examinations are performed with one or more of the following dose reduction techniques: Automated Exposure Control, Adjustment of mA and/or KV according to patient size, or use of iterative reconstruction techniques.   MACRO: None   Signed by: Brendon Hook 12/11/2023 7:05 PM Dictation workstation:   [id number]    US thoracentesis    Result Date: 12/8/2023  Interpreted By:  Sandro Hernandez, STUDY: US THORACENTESIS; 12/7/2023 3:16 pm   INDICATION: Signs/Symptoms:Left pleural effusion question of whether this needs thoracentesis, history of ESRD, just had dialysis today.   COMPARISON: None   ACCESSION NUMBER(S): [id number]   ORDERING CLINICIAN: JEZ LONG   TECHNIQUE: Informed consent obtained. Patient positionedsitting upright. Skin prepped, draped and anesthetized. Under ultrasound guidance, a centesis catheter/needle was advanced into rightpleural cavity.   FINDINGS: A total of 750 cc of clear yellow fluid was aspirated.  A sample was sent for analysis. The patient tolerated the procedure well.       Ultrasound-guided left thoracentesis.     Signed by: Sandro Hernandez 12/8/2023 3:01 PM Dictation workstation:   [id number]    XR chest 1 view    Result Date: 12/8/2023  Interpreted By:  Maria Garcia, STUDY: XR CHEST 1 VIEW 12/8/2023 11:57 am   INDICATION: Signs/Symptoms:S/P thoracentesis   COMPARISON: 12/06/2023   ACCESSION NUMBER(S): [id number]   ORDERING CLINICIAN: JEZ LONG   TECHNIQUE: AP erect view of the chest at bedside   FINDINGS: There is an interval decrease in size of left pleural effusion when compared with the study done 2 days earlier. No pneumothorax is seen.   There is some residual left pleural effusion with infiltrates seen in the left mid and lower lung field. The right lung is clear.   There are surgical clips within the left axilla. Stent grafts are visible in the left subclavian region extending into the left axillary region. A tunneled right jugular dialysis catheter terminates within right atrium.       Decreased size of left effusion following ultrasound-guided thoracentesis with no pneumothorax.   There is some residual left pleural effusion noted with infiltrate in the left mid and lower lung field.   Signed by: Maria Garcia 12/8/2023 12:51 PM Dictation workstation:   [id number]    XR foot 3+ views bilateral    Result Date: 12/7/2023  Interpreted By:  Brendon Hook, STUDY: XR FOOT 3+ VIEWS BILATERAL; 12/7/2023 3:31 pm   INDICATION: Signs/Symptoms:Wound/Abscess/Acute neha process rule out/Comparative Exam.   COMPARISON: None   ACCESSION NUMBER(S): [id number]   ORDERING CLINICIAN: JOSSELYN STAHL   TECHNIQUE: Nonweightbearing AP, lateral and oblique views of the bilateral foot were performed.   FINDINGS: Right foot: Large heel spur is seen. Mild Achilles enthesopathy demonstrated. Moderate atherosclerotic disease is seen. There is suggestion of loss of the arch on this nonweightbearing view.  Degenerative change anterior plafond demonstrated and dorsal talus as well as posterior plafond and dorsal talus. Sclerosis anterior calcaneus is seen. Degenerative changes mild between the midfoot and forefoot. 1st metatarsophalangeal joint degenerative changes demonstrated. 2nd ray absent phalanges. Distal phalanges of the 3rd through 5th rays with overlap were difficult to visualize. Distal phalanx of the great toe is difficult to visualize.   Left foot: Large heel spur is seen. Advanced atherosclerotic disease demonstrated. Degenerative change in the calcaneus and talus seen with moderate anterior and mild posterior plafond degenerative change. Mild degenerative change in the midfoot and forefoot is seen. Distal phalanx of the great toe is absent. Degenerative change of the 2nd ray phalanges and 3rd ray phalanges seen with degenerative change of the 4th and 5th digits not well seen.       1. Right foot: Large heel spur. Atherosclerotic disease. Suggestion of diffuse osteopenia. No sclerosis to suggest osteomyelitis. No subcutaneous air to suggest gangrene. Absent flanges 2nd ray with difficult to visualized distal phalanges of the other digits.   2. Left foot: Large heel spur. Degenerative changes of the hindfoot midfoot and forefoot with atherosclerotic disease. Absent distal phalanx of the great toe. No periostitis or abnormal sclerosis with diffuse osteopenia to suggest osteomyelitis. No subcutaneous air to suggest gangrene.       Signed by: Brendon Hook 12/7/2023 8:39 PM Dictation workstation:   [id number]    ECG 12 Lead    Result Date: 12/7/2023   Poor data quality, interpretation may be adversely affected Normal sinus rhythm Right bundle branch block Septal infarct , age undetermined Possible Lateral infarct , age undetermined Abnormal ECG No previous ECGs available Confirmed by Hammad Callahan ([id number]) on 12/7/2023 1:07:11 PM    XR chest 1 view    Result Date: 12/6/2023  Interpreted By:  Roger Murdock,  STUDY: XR CHEST 1 VIEW;  12/6/2023 2:08 pm   INDICATION: Signs/Symptoms:Brief AMS and hypotension, now resolved.   COMPARISON: 08/21/2023..   ACCESSION NUMBER(S): [id number]   ORDERING CLINICIAN: PATEL TINSLEY   FINDINGS: New moderate left pleural effusion. There is silhouetting of the left heart border. Left subclavian stent present. Right-sided double-lumen central venous catheter terminates within the right ventricle. No acute osseous findings.       New, moderate left pleural effusion.     MACRO: None   Signed by: Roger Murdock 12/6/2023 2:18 PM Dictation workstation:   [id number]     Assessment/Plan   Shock-etiology unclear-on low-dose pressors  Unstageable Bilateral heel ulcers  C-difficile infection-resolving  Type 2 diabetes mellitus with peripheral angiopathy with gangrene   acute respiratory failure-resolved  Coronavirus infection  Pleural effusion, s/p thoracentesis  History of chronic MRSA bacteremia, infective endocarditis-completed IV vancomycin, now on Doxycycline   ESRD on hemodialysis  Severe malnutrition-prealbumin of 3.2           Continue Dificid-total of 10 days  Questran  Oral doxycycline as suppressive therapy for MRSA bacteremia with possible infective endocarditis  Monitor temperature  and WBC  Local care  Offloading  High-protein diet  Monitor stool  Podiatry follow up  Wean off pressors   droplet plus precautions  Contact plus precautions         Stephen Coulter MD

## 2023-12-23 NOTE — PROGRESS NOTES
Ayanna Gross is a 73 y.o. female on day 17 of admission presenting with Hypotension, unspecified hypotension type.      Subjective     No new complaints. On Levophed rate: 0.01mcg           Objective     Last Recorded Vitals  BP (!) 77/34   Pulse 106   Temp 36.3 °C (97.4 °F) (Oral)   Resp 20   Wt 69.5 kg (153 lb 3.5 oz)   SpO2 94%   Intake/Output last 3 Shifts:    Intake/Output Summary (Last 24 hours) at 12/23/2023 0840  Last data filed at 12/23/2023 0600  Gross per 24 hour   Intake 1113.6 ml   Output 1600 ml   Net -486.4 ml         Admission Weight  Weight: 72.6 kg (160 lb) (12/06/23 1301)    Daily Weight  12/22/23 : 69.5 kg (153 lb 3.5 oz)    Image Results      Physical Exam  Constitutional:       Appearance: Normal appearance. She is not toxic-appearing.   HENT:      Head: Normocephalic.      Right Ear: External ear normal.      Left Ear: External ear normal.   Eyes:      Conjunctiva/sclera: Conjunctivae normal.   Cardiovascular:      Rate and Rhythm: Normal rate.      Heart sounds: Normal heart sounds.   Pulmonary:      Effort: Pulmonary effort is normal.      Breath sounds: Normal breath sounds.   Abdominal:      General: Abdomen is flat.      Palpations: Abdomen is soft.   Skin:     General: Skin is warm.   Neurological:      General: No focal deficit present.      Mental Status: She is oriented to person, place, and time.   Psychiatric:         Mood and Affect: Mood normal.       General: alert, no diaphoresis   Lungs: CTA BL   Heart: RRR, no LE edema BL   GI: abdomen soft, nontender, nondistended, BS present   MSK: no joint effusion or deformity   Skin: no rashes, erythema, or ecchymosis   Neuro: grossly normal cognition, motor strength, sensation      Relevant Results               Scheduled medications  apixaban, 2.5 mg, oral, BID  calcium acetate, 667 mg, oral, TID with meals  collagenase, , Topical, Daily  doxycycline, 100 mg, oral, Daily  epoetin di or biosimilar, 10,000 Units, intravenous,  Every Mon/Wed/Fri  escitalopram, 10 mg, oral, Daily  febuxostat, 40 mg, oral, Every other day  fenofibrate, 160 mg, oral, Daily  fidaxomicin, 200 mg, oral, BID  gabapentin, 100 mg, oral, BID  heparin, 2,000 Units, intra-catheter, After Dialysis  heparin, 2,000 Units, intra-catheter, After Dialysis  heparin, 2,000 Units, intra-catheter, After Dialysis  heparin, 2,000 Units, intra-catheter, After Dialysis  insulin lispro, 0-5 Units, subcutaneous, TID with meals  ipratropium-albuteroL, 3 mL, nebulization, TID  [Held by provider] metoprolol succinate XL, 12.5 mg, oral, Daily  midodrine, 15 mg, oral, TID with meals  nystatin, 1 Application, Topical, BID  simvastatin, 40 mg, oral, Nightly      Continuous medications  norepinephrine, 0.01-3 mcg/kg/min, Last Rate: 0.01 mcg/kg/min (12/23/23 0700)      PRN medications  PRN medications: acetaminophen, acetaminophen, dextrose 10 % in water (D10W), dextrose, glucagon, HYDROcodone-acetaminophen, oxygen, polyethylene glycol, zinc oxide    Results for orders placed or performed during the hospital encounter of 12/06/23 (from the past 24 hour(s))   POCT GLUCOSE   Result Value Ref Range    POCT Glucose 118 (H) 74 - 99 mg/dL   POCT GLUCOSE   Result Value Ref Range    POCT Glucose 64 (L) 74 - 99 mg/dL   POCT GLUCOSE   Result Value Ref Range    POCT Glucose 86 74 - 99 mg/dL   POCT GLUCOSE   Result Value Ref Range    POCT Glucose 244 (H) 74 - 99 mg/dL   POCT GLUCOSE   Result Value Ref Range    POCT Glucose 139 (H) 74 - 99 mg/dL        Assessment/Plan        This patient has a central line   Reason for the central line remaining today? Parenteral medication            Principal Problem:    Hypotension, unspecified hypotension type  Active Problems:    Paroxysmal atrial fibrillation (CMS/HCC)    Peripheral vascular disease (CMS/[facility])    Orthostatic hypotension    Mixed hyperlipidemia    End-stage renal disease on hemodialysis (CMS/[facility])    DM (diabetes mellitus) (CMS/[facility])    Absent pedal  pulses    Shock  - trend is towards improvement, albeit slow, and in picture of known chronic hypotension  -  suspect septic overall but could be element of hypovolemia/hypoalbuminemia  - on levophed, titrating for goal systolic in the 70s. Reamains on Levophed though at lowest rate.   - continue high dose midodrine per Dr. Haroldo Adams.   - Empiric Zosyn has been stopped by ID  - continue Dificid per ID    A fibrillation  Continue low-dose beta blocker -on hold formally due to BP  Eliquis for stroke prophylaxis     COVID-19  - diagnosed 12/14  - ID on consult    Dysphagia  - improved     Mixed hyperlipidemia  Continue statin, fenofibrate      DM (diabetes mellitus)   Hold Tradjenta   AC/HS glucose with SSI coverage   Hypoglycemia protocol      Groin excoriation   Wound care consult   Nystatin powder   Keep skin clean and dry   Sacrum covered with Mepilex.      C-diff   PCR positive and negative toxin.   Now on fidaxomicin    ESRD  HD per renal.         Malnutrition Diagnosis Status: Ongoing  Malnutrition Diagnosis: Moderate malnutrition related to acute disease or injury  As Evidenced by: 1-2% unintentional wt loss in 1 week (~4% in 2 weeks), Poor intake <75% of estimated energy requirement > 7days, and mild fat and muscle deficits  I agree with the dietitian's malnutrition diagnosis.       Needs continued ICU care for vasopressors and arterial line monitoring.              Daniel Rubio DO

## 2023-12-24 NOTE — PROGRESS NOTES
"HOSPITALIST  PROGRESS NOTE   Ayanna Gross    :  1950    Medical Record:  [id number]    DATE OF SERVICE: 2023  ADMIT DAY: 18.    Subjective:  Ayanna Gross is a 73 y.o. year-old female who was admitted on 2023 for acute septic shock. I assumed care of the patient from Dr. Rubio on .  The patient's labs, imaging studies and vital signs are noted with the case discussed with the nursing staff. The patient was seen and examined and the chart was reviewed. The patient is being seen for management of their BP along with the pt's other medical conditions. Today pt 3 L, reports \"a little better\" but she denies any F/C/CP/N/V/D/Abd pain.    Objective:  Vitals:    23 0600   BP:    Pulse: 84   Resp: 17   Temp:    SpO2: 97%        I/O last 3 completed shifts:  In: 365.6 (5 mL/kg) [P.O.:240; I.V.:125.6 (1.7 mL/kg)]  Out: 200 (2.7 mL/kg) [Stool:200]  Weight: 73.1 kg   No intake/output data recorded.  Pulmonary:3 L    General: Female, alert and oriented x 2-3, confused at times but is following commands.  HEENT: Normocephalic atraumatic, pupils equally reactive to light and accomodation, extra occular muscles are intact. Neck is supple, trachea is midline without observable bruits.  CVS: Regular rate and rhythm, S1 S2 without any S3 or S4.  Pulmonary: Decreased breath sounds with occasional rales and trace rhonchi.  GI: Abdomen is soft, non tender, positive bowel sounds are present.  EXT: B/L LE peripheral edema 2/4 with pulses are palpable throughout.  : Left upper extremity AV fistula present.  NEUROLOGY: CN 3-12 grossly intact except for known chronic visual impairments, Muscle strength is 4/5, DTRs are 2/4 throughout. There is no obvious facial asymmetry and no resting tremors.Moves all 4 extremities spontaneously.    LABS:  Results for orders placed or performed during the hospital encounter of 23 (from the past 24 hour(s))   Renal function panel   Result Value Ref Range    Glucose " 119 (H) 65 - 99 mg/dL    Sodium 135 133 - 145 mmol/L    Potassium 3.6 3.4 - 5.1 mmol/L    Chloride 97 97 - 107 mmol/L    Bicarbonate 28 24 - 31 mmol/L    Urea Nitrogen 11 8 - 25 mg/dL    Creatinine 2.50 (H) 0.40 - 1.60 mg/dL    eGFR 20 (L) >60 mL/min/1.73m*2    Calcium 8.1 (L) 8.5 - 10.4 mg/dL    Phosphorus 1.0 (L) 2.5 - 4.5 mg/dL    Albumin 2.6 (L) 3.5 - 5.0 g/dL    Anion Gap 10 <=19 mmol/L   CBC and Auto Differential   Result Value Ref Range    WBC 12.6 (H) 4.4 - 11.3 x10*3/uL    nRBC 0.4 (H) 0.0 - 0.0 /100 WBCs    RBC 2.93 (L) 4.00 - 5.20 x10*6/uL    Hemoglobin 8.9 (L) 12.0 - 16.0 g/dL    Hematocrit 26.0 (L) 36.0 - 46.0 %    MCV 89 80 - 100 fL    MCH 30.4 26.0 - 34.0 pg    MCHC 34.2 32.0 - 36.0 g/dL    RDW 23.8 (H) 11.5 - 14.5 %    Platelets 175 150 - 450 x10*3/uL    Neutrophils % 62.5 40.0 - 80.0 %    Immature Granulocytes %, Automated 1.1 (H) 0.0 - 0.9 %    Lymphocytes % 21.9 13.0 - 44.0 %    Monocytes % 8.6 2.0 - 10.0 %    Eosinophils % 5.0 0.0 - 6.0 %    Basophils % 0.9 0.0 - 2.0 %    Neutrophils Absolute 7.86 (H) 1.60 - 5.50 x10*3/uL    Immature Granulocytes Absolute, Automated 0.14 0.00 - 0.50 x10*3/uL    Lymphocytes Absolute 2.75 0.80 - 3.00 x10*3/uL    Monocytes Absolute 1.08 (H) 0.05 - 0.80 x10*3/uL    Eosinophils Absolute 0.63 (H) 0.00 - 0.40 x10*3/uL    Basophils Absolute 0.11 (H) 0.00 - 0.10 x10*3/uL   Morphology   Result Value Ref Range    RBC Morphology See Below     Polychromasia Mild     Target Cells Few     Basophilic Stippling Present     Pappenheimer Bodies Present    POCT GLUCOSE   Result Value Ref Range    POCT Glucose 106 (H) 74 - 99 mg/dL   POCT GLUCOSE   Result Value Ref Range    POCT Glucose 173 (H) 74 - 99 mg/dL   POCT GLUCOSE   Result Value Ref Range    POCT Glucose 133 (H) 74 - 99 mg/dL   Renal Function Panel   Result Value Ref Range    Glucose 139 (H) 65 - 99 mg/dL    Sodium 136 133 - 145 mmol/L    Potassium 4.0 3.4 - 5.1 mmol/L    Chloride 98 97 - 107 mmol/L    Bicarbonate 29 24 - 31  mmol/L    Urea Nitrogen 16 8 - 25 mg/dL    Creatinine 3.20 (H) 0.40 - 1.60 mg/dL    eGFR 15 (L) >60 mL/min/1.73m*2    Calcium 8.8 8.5 - 10.4 mg/dL    Phosphorus 1.9 (L) 2.5 - 4.5 mg/dL    Albumin 2.6 (L) 3.5 - 5.0 g/dL    Anion Gap 9 <=19 mmol/L   CBC and Auto Differential   Result Value Ref Range    WBC 12.7 (H) 4.4 - 11.3 x10*3/uL    nRBC 0.4 (H) 0.0 - 0.0 /100 WBCs    RBC 2.92 (L) 4.00 - 5.20 x10*6/uL    Hemoglobin 9.5 (L) 12.0 - 16.0 g/dL    Hematocrit 26.5 (L) 36.0 - 46.0 %    MCV 91 80 - 100 fL    MCH 32.5 26.0 - 34.0 pg    MCHC 35.8 32.0 - 36.0 g/dL    RDW 25.5 (H) 11.5 - 14.5 %    Platelets 218 150 - 450 x10*3/uL    Neutrophils % 63.2 40.0 - 80.0 %    Immature Granulocytes %, Automated 0.8 0.0 - 0.9 %    Lymphocytes % 21.6 13.0 - 44.0 %    Monocytes % 8.0 2.0 - 10.0 %    Eosinophils % 5.4 0.0 - 6.0 %    Basophils % 1.0 0.0 - 2.0 %    Neutrophils Absolute 8.02 (H) 1.60 - 5.50 x10*3/uL    Immature Granulocytes Absolute, Automated 0.10 0.00 - 0.50 x10*3/uL    Lymphocytes Absolute 2.75 0.80 - 3.00 x10*3/uL    Monocytes Absolute 1.02 (H) 0.05 - 0.80 x10*3/uL    Eosinophils Absolute 0.69 (H) 0.00 - 0.40 x10*3/uL    Basophils Absolute 0.13 (H) 0.00 - 0.10 x10*3/uL   Morphology   Result Value Ref Range    RBC Morphology See Below     Polychromasia Mild     Target Cells Few         No results found for the last 90 days.         MEDICATIONS:  Scheduled medications  apixaban, 2.5 mg, oral, BID  calcium acetate, 667 mg, oral, TID with meals  cholestyramine, 4 g, oral, Daily with evening meal  collagenase, , Topical, Daily  doxycycline, 100 mg, oral, Daily  epoetin di or biosimilar, 10,000 Units, intravenous, Every Mon/Wed/Fri  escitalopram, 10 mg, oral, Daily  febuxostat, 40 mg, oral, Every other day  fenofibrate, 160 mg, oral, Daily  fidaxomicin, 200 mg, oral, BID  gabapentin, 100 mg, oral, BID  heparin, 2,000 Units, intra-catheter, After Dialysis  heparin, 2,000 Units, intra-catheter, After Dialysis  heparin, 2,000  Units, intra-catheter, After Dialysis  heparin, 2,000 Units, intra-catheter, After Dialysis  insulin lispro, 0-5 Units, subcutaneous, TID with meals  ipratropium-albuteroL, 3 mL, nebulization, TID  [Held by provider] metoprolol succinate XL, 12.5 mg, oral, Daily  midodrine, 15 mg, oral, TID with meals  nystatin, 1 Application, Topical, BID  simvastatin, 40 mg, oral, Nightly      Continuous medications  norepinephrine, 0.01-3 mcg/kg/min, Last Rate: 0.02 mcg/kg/min (12/24/23 0500)      PRN medications  PRN medications: acetaminophen, acetaminophen, dextrose 10 % in water (D10W), dextrose, glucagon, HYDROcodone-acetaminophen, oxygen, polyethylene glycol, zinc oxide    PERTINENT IMAGING STUDIES/PROCEDURES:  US thoracentesis    Result Date: 12/20/2023  Interpreted By:  Todd La, STUDY: US THORACENTESIS;  12/15/2023 3:43 pm   INDICATION: Signs/Symptoms:L pleural effusion.   COMPARISON: Chest x-rayDated 12/14/2023.   ACCESSION NUMBER(S): [id number]   ORDERING CLINICIAN: PARIS LUCAS   TECHNIQUE: INTERVENTIONALIST(S): Todd La M.D.   CONSENT: The patient/patient's POA/next of kin was informed of the nature of the proposed procedure. The purposes, alternatives, risks, and benefits were explained and discussed. All questions were answered and consent was obtained.   SEDATION: None   MEDICATION/CONTRAST: No additional   TIME OUT: A time out was performed immediately prior to procedure start with the interventional team, correctly identifying the patient name, date of birth, MRN, procedure, anatomy (including marking of site and side), patient position, procedure consent form, relevant laboratory and imaging test results, antibiotic administration, safety precautions, and procedure-specific equipment needs.   FINDINGS: The patient was placed in the sitting position.   The pleural space was examined with grey scale ultrasound, and the most accessible fluid identified and marked for thoracentesis.   The skin was  prepped and draped in usual manner. Local anesthesia with Lidocaine was administered and a  left-sided thoracentesis was performed.  A 5 Lao One-Step thoracentesis needle/catheter was then placed where marked.  Approximately 600 mL of yellowish colored fluid was removed.  The needle/catheter was then withdrawn.   The patient tolerated the procedure well and there were no immediate complications. Specimen(s) sent to the laboratory and pathology for further evaluation, per the requesting team.       Uneventful  left-sided thoracentesis, as detailed above.   I personally performed and/or directly supervised this study and was present for the entire procedure.   I personally reviewed the study and resident interpretation. I agree with the findings as stated.   Performed and dictated at [facility].   MACRO: None   Signed by: Todd La 12/20/2023 9:48 PM Dictation workstation:   [id number]    XR chest 1 view    Result Date: 12/19/2023  Interpreted By:  Judd Lee, STUDY: XR CHEST 1 VIEW; 12/19/2023 5:44 am   INDICATION: Signs/Symptoms:pleural effusion.   COMPARISON: December 14, 2023   ACCESSION NUMBER(S): [id number]   ORDERING CLINICIAN: PARIS LUCAS   FINDINGS: RESULT: Right sided central venous catheter is again noted extending into the right atrium. The distal tip is not well visualized. Left-sided vascular stents are noted in the left subclavian region. Left axillary surgical clips are noted. The cardiac silhouette is within normal limits for size. Mediastinal contours are unremarkable. Left basilar opacity obscures the left hemidiaphragm with hazy opacity throughout the left lung. There are degenerative changes of the thoracic spine and shoulders.       Persistent left basilar opacity obscures the left hemidiaphragm with hazy opacity extending through the left hemithorax. Findings likely represent pleural effusion atelectasis. Underlying infiltrate or mass is  not excluded. Overall findings not significantly changed compared to previous exam     Signed by: Judd Lee 12/19/2023 9:02 AM Dictation workstation:   [id number]    XR chest 1 view    Result Date: 12/14/2023  Interpreted By:  Judd Lee, STUDY: XR CHEST 1 VIEW; 12/14/2023 10:43 am   INDICATION: Hypoxic respiratory failure.   COMPARISON: December 8, 2023   ACCESSION NUMBER(S): [id number]   ORDERING CLINICIAN: RODRI SURESH   FINDINGS: RESULT: Right-sided double-lumen dialysis catheter is noted with distal tip at the level of the right atrium. Vascular stents are noted in the left subclavian region. Surgical clips are noted in the left axilla. Cardiac silhouette size is indeterminate as the left heart border is obscured. There are calcifications involving the thoracic aorta. Left basilar obscurity obscures the left hemidiaphragm and left heart border with hazy opacity extending to the left mid lung. There are degenerative changes of the bilateral shoulders.       Increasing density in the left mid and lower lung suggests increasing infiltrate or pleural effusion.     Signed by: Jdud Lee 12/14/2023 10:58 AM Dictation workstation:   [id number]    Vascular US ankle brachial index (OCTAVIO) without exercise    Result Date: 12/13/2023           [place] [address]            Phone [phone]  Vascular Lab Report  [facility] US ANKLE BRACHIAL INDEX (OCTAVIO) WITHOUT EXERCISE Patient Name:      BASIA Gray Physician: [id number] Lizet Messer MD Study Date:        12/11/2023          Ordering Provider: [id number] MARIANN SUTTON MRN/PID:           [id number]            Fellow: Accession#:        [id number]        Technologist:      Luz Maria Trivedi RVT Date of Birth/Age: 1950 / 73 years Technologist 2:    Dara Parr RVT Gender:            F                   Encounter#:        [id number]  Admission Status:  Inpatient           Location           [facility]                                        Performed:  Diagnosis/ICD: Other specified symptoms and signs involving the circulatory and                respiratory systems-R09.89 Indication:    Peripheral vascular disease CPT Codes:     02048.52 Peripheral artery OCTAVIO Only Reduced Service  Pertinent History: Absent pedal pulses. Patient is on dialysis.  CONCLUSIONS: Right Lower PVR: Evidence of moderate arterial occlusive disease in the right lower extremity at rest. Right pressures of >220 mmHg suggest no compressibility of vessels and may make absolute Segmental Limb Pressures (SLP) unreliable. Decreased digital perfusion noted. Biphasic flow is noted in the right dorsalis pedis artery. Left Lower PVR: Evidence of moderate arterial occlusive disease in the left lower extremity at rest. Left pressures of >220 mmHg suggest no compressibility of vessels and may make absolute Segmental Limb Pressures (SLP) unreliable. Decreased digital perfusion noted. Biphasic flow is noted in the left dorsalis pedis artery. Additional Findings: Technically difficult and limited exam due to patient's positioning, movement and inability to cooperate with exam.  Imaging & Doppler Findings:  RIGHT Lower PVR              Pressures Right Dorsalis Pedis (Ankle) 255 mmHg   LEFT Lower PVR              Pressures Left Dorsalis Pedis (Ankle) 255 mmHg   [id number] Lizet Messer MD Electronically signed by [id number] Lizet Messer MD on 12/13/2023 at 8:38:56 AM  ** Final **     CT head wo IV contrast    Result Date: 12/11/2023  Interpreted By:  Brendon Hook, STUDY: CT HEAD WO IV CONTRAST; 12/11/2023 5:20 pm   INDICATION: Signs/Symptoms:acute confusion.   COMPARISON: 20 August 2023   ACCESSION NUMBER(S): [id number]   ORDERING CLINICIAN: RODRI SURESH   TECHNIQUE: CT was performed with one or more of the following dose reduction techniques: automated exposure control, adjustment of  the mA and/or kV according to patient size, or use of iterative reconstruction technique.       PROCEDURE: 3.0 mm axial images were obtained through the brain, to include the posterior fossa without intravenous contrast enhancement.   FINDINGS: The patient's head is turned to the left and slightly canted in the gantry. Mild motion artifact is seen. There is symmetric volume loss of the cerebral hemispheres. Moderate  decreased attenuation in the bilateral periventricular white matter, consistent with microangiopathy is noted.  There is no encephalomalacic change. Brainstem is unremarkable. Cerebellar hemispheres are symmetric. No subarachnoid, intraparenchymal, subdural or interventricular hemorrhage. No intra- or extra-axial mass or abnormal blood products are demonstrated. Hyperostosis frontalis interna is seen. Slight hypoplasia of the left mastoid.   The paranasal sinuses and mastoids as well as calvarium are unremarkable.       1. No acute intracranial findings. 2. Symmetric volume loss of the cerebral hemispheres. 3. Periventricular Microangiopathy. 4. No posttraumatic abnormality.   This report has been produced using speech recognition. This exam is available in DICOM format to non-affiliated healthcare facilities on a secure media free searchable basis with prior patient authorization. The patient exposure is reported to a radiation dose index registry. All CT examinations are performed with one or more of the following dose reduction techniques: Automated Exposure Control, Adjustment of mA and/or KV according to patient size, or use of iterative reconstruction techniques.   MACRO: None   Signed by: Brendon Hook 12/11/2023 7:05 PM Dictation workstation:   [id number]    US thoracentesis    Result Date: 12/8/2023  Interpreted By:  Sandro Hernandez, STUDY: US THORACENTESIS; 12/7/2023 3:16 pm   INDICATION: Signs/Symptoms:Left pleural effusion question of whether this needs thoracentesis, history of ESRD, just  had dialysis today.   COMPARISON: None   ACCESSION NUMBER(S): [id number]   ORDERING CLINICIAN: JEZ LONG   TECHNIQUE: Informed consent obtained. Patient positionedsitting upright. Skin prepped, draped and anesthetized. Under ultrasound guidance, a centesis catheter/needle was advanced into rightpleural cavity.   FINDINGS: A total of 750 cc of clear yellow fluid was aspirated. A sample was sent for analysis. The patient tolerated the procedure well.       Ultrasound-guided left thoracentesis.     Signed by: Sandro Hernandez 12/8/2023 3:01 PM Dictation workstation:   [id number]    XR chest 1 view    Result Date: 12/8/2023  Interpreted By:  Maria Garcia, STUDY: XR CHEST 1 VIEW 12/8/2023 11:57 am   INDICATION: Signs/Symptoms:S/P thoracentesis   COMPARISON: 12/06/2023   ACCESSION NUMBER(S): [id number]   ORDERING CLINICIAN: JEZ LONG   TECHNIQUE: AP erect view of the chest at bedside   FINDINGS: There is an interval decrease in size of left pleural effusion when compared with the study done 2 days earlier. No pneumothorax is seen.   There is some residual left pleural effusion with infiltrates seen in the left mid and lower lung field. The right lung is clear.   There are surgical clips within the left axilla. Stent grafts are visible in the left subclavian region extending into the left axillary region. A tunneled right jugular dialysis catheter terminates within right atrium.       Decreased size of left effusion following ultrasound-guided thoracentesis with no pneumothorax.   There is some residual left pleural effusion noted with infiltrate in the left mid and lower lung field.   Signed by: Maria Garcia 12/8/2023 12:51 PM Dictation workstation:   [id number]    XR foot 3+ views bilateral    Result Date: 12/7/2023  Interpreted By:  Brendon Hook, STUDY: XR FOOT 3+ VIEWS BILATERAL; 12/7/2023 3:31 pm   INDICATION: Signs/Symptoms:Wound/Abscess/Acute neha process rule out/Comparative Exam.   COMPARISON:  None   ACCESSION NUMBER(S): [id number]   ORDERING CLINICIAN: JOSSELYN STAHL   TECHNIQUE: Nonweightbearing AP, lateral and oblique views of the bilateral foot were performed.   FINDINGS: Right foot: Large heel spur is seen. Mild Achilles enthesopathy demonstrated. Moderate atherosclerotic disease is seen. There is suggestion of loss of the arch on this nonweightbearing view. Degenerative change anterior plafond demonstrated and dorsal talus as well as posterior plafond and dorsal talus. Sclerosis anterior calcaneus is seen. Degenerative changes mild between the midfoot and forefoot. 1st metatarsophalangeal joint degenerative changes demonstrated. 2nd ray absent phalanges. Distal phalanges of the 3rd through 5th rays with overlap were difficult to visualize. Distal phalanx of the great toe is difficult to visualize.   Left foot: Large heel spur is seen. Advanced atherosclerotic disease demonstrated. Degenerative change in the calcaneus and talus seen with moderate anterior and mild posterior plafond degenerative change. Mild degenerative change in the midfoot and forefoot is seen. Distal phalanx of the great toe is absent. Degenerative change of the 2nd ray phalanges and 3rd ray phalanges seen with degenerative change of the 4th and 5th digits not well seen.       1. Right foot: Large heel spur. Atherosclerotic disease. Suggestion of diffuse osteopenia. No sclerosis to suggest osteomyelitis. No subcutaneous air to suggest gangrene. Absent flanges 2nd ray with difficult to visualized distal phalanges of the other digits.   2. Left foot: Large heel spur. Degenerative changes of the hindfoot midfoot and forefoot with atherosclerotic disease. Absent distal phalanx of the great toe. No periostitis or abnormal sclerosis with diffuse osteopenia to suggest osteomyelitis. No subcutaneous air to suggest gangrene.       Signed by: Brendon Hook 12/7/2023 8:39 PM Dictation workstation:   [id number]    ECG 12 Lead    Result  Date: 12/7/2023   Poor data quality, interpretation may be adversely affected Normal sinus rhythm Right bundle branch block Septal infarct , age undetermined Possible Lateral infarct , age undetermined Abnormal ECG No previous ECGs available Confirmed by Hammad Callahan ([id number]) on 12/7/2023 1:07:11 PM    XR chest 1 view    Result Date: 12/6/2023  Interpreted By:  Roger Murdock, STUDY: XR CHEST 1 VIEW;  12/6/2023 2:08 pm   INDICATION: Signs/Symptoms:Brief AMS and hypotension, now resolved.   COMPARISON: 08/21/2023..   ACCESSION NUMBER(S): [id number]   ORDERING CLINICIAN: PATEL TINSLEY   FINDINGS: New moderate left pleural effusion. There is silhouetting of the left heart border. Left subclavian stent present. Right-sided double-lumen central venous catheter terminates within the right ventricle. No acute osseous findings.       New, moderate left pleural effusion.     MACRO: None   Signed by: Roger Murdock 12/6/2023 2:18 PM Dictation workstation:   [id number]    Assessment:  Ayanna Gross is a 73 y.o. year-old female on admission for 18 days    Principal Problem:    Hypotension septic shock  Active Problems:    Paroxysmal atrial fibrillation (CMS/HCC)    Peripheral vascular disease (CMS/HCC)    Orthostatic hypotension    Mixed hyperlipidemia    End-stage renal disease on hemodialysis (CMS/HCC)    DM (diabetes mellitus) (CMS/HCC)    Absent pedal pulses     Acute septic shock  - trend is towards improvement, albeit slow, and in picture of known chronic hypotension.  Continue to hold patient's Toprol-XL.  -  suspect septic overall but could be element of hypovolemia/hypoalbuminemia  - on levophed, titrating for goal systolic in the 70s. Reamains on Levophed though at lowest rate.   - continue high dose midodrine per Dr. Haroldo Adams.   - Empiric Zosyn has been stopped by ID  - continue Dificid and oral doxycycline per ID  -Needs continued ICU care for vasopressors and arterial line monitoring.      A fibrillation  Deviously on  low-dose beta blocker -on hold formally due to BP  Eliquis for stroke prophylaxis      COVID-19  - diagnosed 12/14  - ID on consult     Dysphagia  - improved     Mixed hyperlipidemia  Continue statin, fenofibrate      DM (diabetes mellitus)   Hold Tradjenta   AC/HS glucose with SSI coverage   Hypoglycemia protocol      Groin excoriation   Wound care consult   Nystatin powder   Keep skin clean and dry   Sacrum covered with Mepilex.      C-diff   PCR positive and negative toxin.   Now on fidaxomicin     ESRD  HD per nephrology and they are managing electrolyte replacement    Malnutrition Diagnosis Status: Ongoing  Malnutrition Diagnosis: Moderate malnutrition related to acute disease or injury  As Evidenced by: 1-2% unintentional wt loss in 1 week (~4% in 2 weeks), Poor intake <75% of estimated energy requirement > 7days, and mild fat and muscle deficits  I agree with the dietitian's malnutrition diagnosis.      OTHER:    -The patient continues to be on the rest of their chronic home medications for hyperlipidemia, neuropathy, etc.  Will update family once available.   -Discharge planner is on the case with PT and OT following as well.    *CODE STATUS: DNR and no intubation     *DVT prophylaxis: Eliquis     *PUD PROPHYLAXIS: None due to ongoing C. difficile     Time spent managing patient's care is greater than 36 minutes with approximately 50% or more spent in counseling and coordination of care.    Naseem WARD    Portions of this Progress note were dictated using MModal and reviewed . While every effort was made to correct mistranscriptions, minor grammatical or typographical errors may be present from machine dictation

## 2023-12-24 NOTE — PROGRESS NOTES
Ayanna Gross is a 73 y.o. female on day 18 of admission presenting with Hypotension, unspecified hypotension type.      Subjective   No new overnight events.  Breathing about the same.       Scheduled medications  apixaban, 2.5 mg, oral, BID  calcium acetate, 667 mg, oral, TID with meals  cholestyramine, 4 g, oral, Daily with evening meal  collagenase, , Topical, Daily  doxycycline, 100 mg, oral, Daily  epoetin di or biosimilar, 10,000 Units, intravenous, Every Mon/Wed/Fri  escitalopram, 10 mg, oral, Daily  febuxostat, 40 mg, oral, Every other day  fenofibrate, 160 mg, oral, Daily  fidaxomicin, 200 mg, oral, BID  gabapentin, 100 mg, oral, BID  heparin, 2,000 Units, intra-catheter, After Dialysis  heparin, 2,000 Units, intra-catheter, After Dialysis  heparin, 2,000 Units, intra-catheter, After Dialysis  heparin, 2,000 Units, intra-catheter, After Dialysis  insulin lispro, 0-5 Units, subcutaneous, TID with meals  ipratropium-albuteroL, 3 mL, nebulization, TID  [Held by provider] metoprolol succinate XL, 12.5 mg, oral, Daily  midodrine, 15 mg, oral, TID with meals  nystatin, 1 Application, Topical, BID  simvastatin, 40 mg, oral, Nightly      Continuous medications  norepinephrine, 0.01-3 mcg/kg/min, Last Rate: 0.07 mcg/kg/min (12/24/23 1121)      PRN medications  PRN medications: acetaminophen, acetaminophen, dextrose 10 % in water (D10W), dextrose, glucagon, HYDROcodone-acetaminophen, oxygen, polyethylene glycol, zinc oxide      Objective      Vitals 24HR  Heart Rate:  []   Temp:  [36 °C (96.8 °F)-36.8 °C (98.2 °F)]   Resp:  [0-29]   Weight:  [73.1 kg (161 lb 2.5 oz)]   SpO2:  [72 %-100 %]     General: elderly female, not in distress  Access: TDC in place  Eyes: pale, anicteric  Lungs: coarse BS  Heart: S1 and S2, regular  Abdomen: soft, non tender  Extremities: no pedal edema  Neuro: awake and interactive    Intake/Output last 3 Shifts:    Intake/Output Summary (Last 24 hours) at 12/24/2023 1223  Last data  filed at 12/24/2023 1119  Gross per 24 hour   Intake 236.76 ml   Output 200 ml   Net 36.76 ml       Relevant Results    Results for orders placed or performed during the hospital encounter of 12/06/23 (from the past 24 hour(s))   POCT GLUCOSE   Result Value Ref Range    POCT Glucose 173 (H) 74 - 99 mg/dL   POCT GLUCOSE   Result Value Ref Range    POCT Glucose 133 (H) 74 - 99 mg/dL   Renal Function Panel   Result Value Ref Range    Glucose 139 (H) 65 - 99 mg/dL    Sodium 136 133 - 145 mmol/L    Potassium 4.0 3.4 - 5.1 mmol/L    Chloride 98 97 - 107 mmol/L    Bicarbonate 29 24 - 31 mmol/L    Urea Nitrogen 16 8 - 25 mg/dL    Creatinine 3.20 (H) 0.40 - 1.60 mg/dL    eGFR 15 (L) >60 mL/min/1.73m*2    Calcium 8.8 8.5 - 10.4 mg/dL    Phosphorus 1.9 (L) 2.5 - 4.5 mg/dL    Albumin 2.6 (L) 3.5 - 5.0 g/dL    Anion Gap 9 <=19 mmol/L   CBC and Auto Differential   Result Value Ref Range    WBC 12.7 (H) 4.4 - 11.3 x10*3/uL    nRBC 0.4 (H) 0.0 - 0.0 /100 WBCs    RBC 2.92 (L) 4.00 - 5.20 x10*6/uL    Hemoglobin 9.5 (L) 12.0 - 16.0 g/dL    Hematocrit 26.5 (L) 36.0 - 46.0 %    MCV 91 80 - 100 fL    MCH 32.5 26.0 - 34.0 pg    MCHC 35.8 32.0 - 36.0 g/dL    RDW 25.5 (H) 11.5 - 14.5 %    Platelets 218 150 - 450 x10*3/uL    Neutrophils % 63.2 40.0 - 80.0 %    Immature Granulocytes %, Automated 0.8 0.0 - 0.9 %    Lymphocytes % 21.6 13.0 - 44.0 %    Monocytes % 8.0 2.0 - 10.0 %    Eosinophils % 5.4 0.0 - 6.0 %    Basophils % 1.0 0.0 - 2.0 %    Neutrophils Absolute 8.02 (H) 1.60 - 5.50 x10*3/uL    Immature Granulocytes Absolute, Automated 0.10 0.00 - 0.50 x10*3/uL    Lymphocytes Absolute 2.75 0.80 - 3.00 x10*3/uL    Monocytes Absolute 1.02 (H) 0.05 - 0.80 x10*3/uL    Eosinophils Absolute 0.69 (H) 0.00 - 0.40 x10*3/uL    Basophils Absolute 0.13 (H) 0.00 - 0.10 x10*3/uL   Morphology   Result Value Ref Range    RBC Morphology See Below     Polychromasia Mild     Target Cells Few    POCT GLUCOSE   Result Value Ref Range    POCT Glucose 135 (H) 74  - 99 mg/dL   POCT GLUCOSE   Result Value Ref Range    POCT Glucose 163 (H) 74 - 99 mg/dL         Assessment/Plan      ESRD on HD  Hypotension, on midodrine  Hypophosphatemia  Anemia in CKD, on BIB    Scheduled for dialysis today.    Hypophosphatemia noted -  will discontinue Phoslo and replace Kphos today.    Continue other care.      Raj Mcgee MD

## 2023-12-24 NOTE — PROGRESS NOTES
Ayanna Gross is a 73 y.o. female on day 18 of admission presenting with Hypotension, unspecified hypotension type.    Subjective   Interval History:   Room not entered-limit exposure  Patient discussed with nurse  Patient observed through glass window, resting comfortably  Afebrile  Remains on low-dose pressors        Review of Systems    Objective   Range of Vitals (last 24 hours)  Heart Rate:  []   Temp:  [36 °C (96.8 °F)-36.4 °C (97.5 °F)]   Resp:  [0-29]   Weight:  [69.5 kg (153 lb 3.5 oz)-73.1 kg (161 lb 2.5 oz)]   SpO2:  [82 %-100 %]   Daily Weight  12/24/23 : 73.1 kg (161 lb 2.5 oz)    Body mass index is 29.47 kg/m².    Physical Exam  Awake, alert    Antibiotics  sodium chloride 0.9 % bolus 500 mL  cefepime (Maxipime) 1 g in dextrose 5 % 50 mL IV  vancomycin-diluent combo no.1 (Xellia) IVPB 1 g  apixaban (Eliquis) tablet 2.5 mg  calcium acetate (Phoslo) capsule 2,000 mg  escitalopram (Lexapro) tablet 10 mg  febuxostat (Uloric) tablet 40 mg  fenofibrate (Triglide) tablet 160 mg  gabapentin (Neurontin) capsule 300 mg  metoprolol succinate XL (Toprol-XL) 24 hr tablet 12.5 mg  midodrine (Proamatine) tablet 10 mg  nystatin (Mycostatin) 100,000 unit/gram powder 1 Application  B complex-vitamin C tablet 1 tablet  simvastatin (Zocor) tablet 40 mg  acetaminophen (Tylenol) tablet 650 mg  acetaminophen (Tylenol) tablet 650 mg  polyethylene glycol (Glycolax, Miralax) packet 17 g  dextrose 50 % injection 25 g  glucagon (Glucagen) injection 1 mg  dextrose 10 % in water (D10W) infusion  insulin lispro (HumaLOG) injection 0-5 Units  zinc oxide 20 % ointment 1 Application  calcium acetate (Phoslo) capsule 667 mg  vancomycin (Vancocin) capsule 125 mg  lidocaine PF (Xylocaine) 10 mg/mL (1 %) injection  heparin 1,000 unit/mL injection 2,000 Units  heparin 1,000 unit/mL injection 2,000 Units  midodrine (Proamatine) tablet 15 mg  albumin human 25 % solution 25 g  polyethylene glycol (Glycolax, Miralax) packet 17  g  collagenase 250 unit/gram ointment  vancomycin (Vancocin) 1,750 mg in dextrose 5 % in water (D5W) 250 mL IV  vancomycin-diluent combo no.1 (Xellia) IVPB 1,750 mg  albumin human 25 % solution 12.5 g  heparin 1,000 unit/mL injection 2,000 Units  heparin 1,000 unit/mL injection 2,000 Units  vancomycin (Vancocin) placeholder  doxycycline (Vibramycin) capsule 100 mg  potassium chloride CR (Klor-Con) ER tablet 10 mEq  gabapentin (Neurontin) capsule 100 mg  heparin 1,000 unit/mL injection 2,000 Units  heparin 1,000 unit/mL injection 2,000 Units  piperacillin-tazobactam-dextrose (Zosyn) IV 2.25 g  doxycycline (Vibramycin) capsule 100 mg  ipratropium-albuteroL (Duo-Neb) 0.5-2.5 mg/3 mL nebulizer solution 3 mL  heparin 1,000 unit/mL injection 2,000 Units  heparin 1,000 unit/mL injection 2,000 Units  ipratropium-albuteroL (Duo-Neb) 0.5-2.5 mg/3 mL nebulizer solution 3 mL  dexAMETHasone (Decadron) injection 6 mg  albumin human 25 % solution 25 g  epoetin di-epbx (Retacrit) injection 10,000 Units  lidocaine PF (Xylocaine) 20 mg/mL (2 %) injection  potassium chloride CR (Klor-Con) ER tablet 10 mEq  albumin human 5 % infusion 25 g  potassium chloride CR (Klor-Con M20) ER tablet 20 mEq  norepinephrine (Levophed) 8 mg in dextrose 5% 250 mL (0.032 mg/mL) infusion (premix)  oxygen (O2) therapy  piperacillin-tazobactam-dextrose (Zosyn) IV 2.25 g  heparin 1,000 unit/mL injection 2,000 Units  heparin 1,000 unit/mL injection 2,000 Units  potassium chloride 20 mEq in 100 mL IV premix  fidaxomicin (Dificid) tablet 200 mg  norepinephrine (Levophed) 8 mg in dextrose 5% 250 mL (0.032 mg/mL) infusion (premix)  heparin 1,000 unit/mL injection 2,000 Units  heparin 1,000 unit/mL injection 2,000 Units  HYDROcodone-acetaminophen (Norco) 5-325 mg per tablet 1 tablet  dextrose 50 % injection 25 g  glucagon (Glucagen) injection 1 mg  dextrose 10 % in water (D10W) infusion  potassium chloride (Klor-Con) packet 20 mEq  potassium chloride 20 mEq in  100 mL IV premix  cosyntropin (Cortrosyn) injection 250 mcg  heparin 1,000 unit/mL injection 2,000 Units  heparin 1,000 unit/mL injection 2,000 Units  heparin 1,000 unit/mL injection 2,000 Units  heparin 1,000 unit/mL injection 2,000 Units  cholestyramine (Questran) 4 gram packet 4 g      Relevant Results  Labs  Results from last 72 hours   Lab Units 12/24/23 0229 12/23/23  0941 12/22/23  0534   WBC AUTO x10*3/uL 12.7* 12.6* 11.9*   HEMOGLOBIN g/dL 9.5* 8.9* 9.4*   HEMATOCRIT % 26.5* 26.0* 27.7*   PLATELETS AUTO x10*3/uL 218 175 195   NEUTROS PCT AUTO % 63.2 62.5 60.3   LYMPHS PCT AUTO % 21.6 21.9 20.6   MONOS PCT AUTO % 8.0 8.6 10.1   EOS PCT AUTO % 5.4 5.0 6.7     Results from last 72 hours   Lab Units 12/24/23 0229 12/23/23  0941 12/22/23  0534   SODIUM mmol/L 136 135 139   POTASSIUM mmol/L 4.0 3.6 3.9   CHLORIDE mmol/L 98 97 100   CO2 mmol/L 29 28 28   BUN mg/dL 16 11 14   CREATININE mg/dL 3.20* 2.50* 2.90*   GLUCOSE mg/dL 139* 119* 152*   CALCIUM mg/dL 8.8 8.1* 8.6   ANION GAP mmol/L 9 10 11   EGFR mL/min/1.73m*2 15* 20* 17*   PHOSPHORUS mg/dL 1.9* 1.0* 1.4*     Results from last 72 hours   Lab Units 12/24/23 0229 12/23/23  0941 12/22/23  0534   ALBUMIN g/dL 2.6* 2.6* 2.8*     Estimated Creatinine Clearance: 15.1 mL/min (A) (by C-G formula based on SCr of 3.2 mg/dL (H)).  CRP   Date Value Ref Range Status   06/23/2023 19.9 (H) 0 - 2.0 MG/DL Final     Comment:     Performed at [address] [address]   05/22/2022 1.0 0 - 2.0 MG/DL Final     Comment:     [facility] [place] [address] [facility] [address]   12/13/2020 10.1 (H) 0 - 2.0 MG/DL Final     Comment:     Performed at [place] [address] [facility] [address]     Microbiology  Reviewed  Imaging  US thoracentesis    Result Date: 12/20/2023  Interpreted By:  Todd La, STUDY: US THORACENTESIS;  12/15/2023 3:43 pm   INDICATION: Signs/Symptoms:L pleural effusion.   COMPARISON: Chest x-rayDated 12/14/2023.    ACCESSION NUMBER(S): [id number]   ORDERING CLINICIAN: PARIS LUCAS   TECHNIQUE: INTERVENTIONALIST(S): Todd La M.D.   CONSENT: The patient/patient's POA/next of kin was informed of the nature of the proposed procedure. The purposes, alternatives, risks, and benefits were explained and discussed. All questions were answered and consent was obtained.   SEDATION: None   MEDICATION/CONTRAST: No additional   TIME OUT: A time out was performed immediately prior to procedure start with the interventional team, correctly identifying the patient name, date of birth, MRN, procedure, anatomy (including marking of site and side), patient position, procedure consent form, relevant laboratory and imaging test results, antibiotic administration, safety precautions, and procedure-specific equipment needs.   FINDINGS: The patient was placed in the sitting position.   The pleural space was examined with grey scale ultrasound, and the most accessible fluid identified and marked for thoracentesis.   The skin was prepped and draped in usual manner. Local anesthesia with Lidocaine was administered and a  left-sided thoracentesis was performed.  A 5 Jamaican One-Step thoracentesis needle/catheter was then placed where marked.  Approximately 600 mL of yellowish colored fluid was removed.  The needle/catheter was then withdrawn.   The patient tolerated the procedure well and there were no immediate complications. Specimen(s) sent to the laboratory and pathology for further evaluation, per the requesting team.       Uneventful  left-sided thoracentesis, as detailed above.   I personally performed and/or directly supervised this study and was present for the entire procedure.   I personally reviewed the study and resident interpretation. I agree with the findings as stated.   Performed and dictated at [facility].   MACRO: None   Signed by: Todd La 12/20/2023 9:48 PM Dictation workstation:    [id number]    XR chest 1 view    Result Date: 12/19/2023  Interpreted By:  Judd eLe, STUDY: XR CHEST 1 VIEW; 12/19/2023 5:44 am   INDICATION: Signs/Symptoms:pleural effusion.   COMPARISON: December 14, 2023   ACCESSION NUMBER(S): [id number]   ORDERING CLINICIAN: PARIS LUCAS   FINDINGS: RESULT: Right sided central venous catheter is again noted extending into the right atrium. The distal tip is not well visualized. Left-sided vascular stents are noted in the left subclavian region. Left axillary surgical clips are noted. The cardiac silhouette is within normal limits for size. Mediastinal contours are unremarkable. Left basilar opacity obscures the left hemidiaphragm with hazy opacity throughout the left lung. There are degenerative changes of the thoracic spine and shoulders.       Persistent left basilar opacity obscures the left hemidiaphragm with hazy opacity extending through the left hemithorax. Findings likely represent pleural effusion atelectasis. Underlying infiltrate or mass is not excluded. Overall findings not significantly changed compared to previous exam     Signed by: Judd Lee 12/19/2023 9:02 AM Dictation workstation:   [id number]    XR chest 1 view    Result Date: 12/14/2023  Interpreted By:  Judd Lee, STUDY: XR CHEST 1 VIEW; 12/14/2023 10:43 am   INDICATION: Hypoxic respiratory failure.   COMPARISON: December 8, 2023   ACCESSION NUMBER(S): [id number]   ORDERING CLINICIAN: RODRI SURESH   FINDINGS: RESULT: Right-sided double-lumen dialysis catheter is noted with distal tip at the level of the right atrium. Vascular stents are noted in the left subclavian region. Surgical clips are noted in the left axilla. Cardiac silhouette size is indeterminate as the left heart border is obscured. There are calcifications involving the thoracic aorta. Left basilar obscurity obscures the left hemidiaphragm and left heart border with hazy opacity extending to the left mid lung.  There are degenerative changes of the bilateral shoulders.       Increasing density in the left mid and lower lung suggests increasing infiltrate or pleural effusion.     Signed by: Judd Lee 12/14/2023 10:58 AM Dictation workstation:   [id number]    Vascular US ankle brachial index (OCTAVIO) without exercise    Result Date: 12/13/2023           [place] [address]            Phone [phone]  Vascular Lab Report  VASC US ANKLE BRACHIAL INDEX (OCTAVIO) WITHOUT EXERCISE Patient Name:      BASIA Gray Physician: [id number] Lizet Messer MD Study Date:        12/11/2023          Ordering Provider: [id number] MARIANN SUTTON MRN/PID:           [id number]            Fellow: Accession#:        [id number]        Technologist:      Luz Maria Trivedi RVT Date of Birth/Age: 1950 / 73 years Technologist 2:    Dara Parr RVT Gender:            F                   Encounter#:        [id number] Admission Status:  Inpatient           Location           [facility]                                        Performed:  Diagnosis/ICD: Other specified symptoms and signs involving the circulatory and                respiratory systems-R09.89 Indication:    Peripheral vascular disease CPT Codes:     68338.52 Peripheral artery OCTAVIO Only Reduced Service  Pertinent History: Absent pedal pulses. Patient is on dialysis.  CONCLUSIONS: Right Lower PVR: Evidence of moderate arterial occlusive disease in the right lower extremity at rest. Right pressures of >220 mmHg suggest no compressibility of vessels and may make absolute Segmental Limb Pressures (SLP) unreliable. Decreased digital perfusion noted. Biphasic flow is noted in the right dorsalis pedis artery. Left Lower PVR: Evidence of moderate arterial occlusive disease in the left lower extremity at rest. Left pressures of >220 mmHg suggest no compressibility of vessels  and may make absolute Segmental Limb Pressures (SLP) unreliable. Decreased digital perfusion noted. Biphasic flow is noted in the left dorsalis pedis artery. Additional Findings: Technically difficult and limited exam due to patient's positioning, movement and inability to cooperate with exam.  Imaging & Doppler Findings:  RIGHT Lower PVR              Pressures Right Dorsalis Pedis (Ankle) 255 mmHg   LEFT Lower PVR              Pressures Left Dorsalis Pedis (Ankle) 255 mmHg   [id number] Lizet Messer MD Electronically signed by [id number] Lizet Messer MD on 12/13/2023 at 8:38:56 AM  ** Final **     CT head wo IV contrast    Result Date: 12/11/2023  Interpreted By:  Brendon Hook, STUDY: CT HEAD WO IV CONTRAST; 12/11/2023 5:20 pm   INDICATION: Signs/Symptoms:acute confusion.   COMPARISON: 20 August 2023   ACCESSION NUMBER(S): [id number]   ORDERING CLINICIAN: RODRI SURESH   TECHNIQUE: CT was performed with one or more of the following dose reduction techniques: automated exposure control, adjustment of the mA and/or kV according to patient size, or use of iterative reconstruction technique.       PROCEDURE: 3.0 mm axial images were obtained through the brain, to include the posterior fossa without intravenous contrast enhancement.   FINDINGS: The patient's head is turned to the left and slightly canted in the gantry. Mild motion artifact is seen. There is symmetric volume loss of the cerebral hemispheres. Moderate  decreased attenuation in the bilateral periventricular white matter, consistent with microangiopathy is noted.  There is no encephalomalacic change. Brainstem is unremarkable. Cerebellar hemispheres are symmetric. No subarachnoid, intraparenchymal, subdural or interventricular hemorrhage. No intra- or extra-axial mass or abnormal blood products are demonstrated. Hyperostosis frontalis interna is seen. Slight hypoplasia of the left mastoid.   The paranasal sinuses and mastoids as well as calvarium are  unremarkable.       1. No acute intracranial findings. 2. Symmetric volume loss of the cerebral hemispheres. 3. Periventricular Microangiopathy. 4. No posttraumatic abnormality.   This report has been produced using speech recognition. This exam is available in DICOM format to non-affiliated healthcare facilities on a secure media free searchable basis with prior patient authorization. The patient exposure is reported to a radiation dose index registry. All CT examinations are performed with one or more of the following dose reduction techniques: Automated Exposure Control, Adjustment of mA and/or KV according to patient size, or use of iterative reconstruction techniques.   MACRO: None   Signed by: Brendon Hook 12/11/2023 7:05 PM Dictation workstation:   [id number]    US thoracentesis    Result Date: 12/8/2023  Interpreted By:  Sandro Hernandez, STUDY: US THORACENTESIS; 12/7/2023 3:16 pm   INDICATION: Signs/Symptoms:Left pleural effusion question of whether this needs thoracentesis, history of ESRD, just had dialysis today.   COMPARISON: None   ACCESSION NUMBER(S): [id number]   ORDERING CLINICIAN: JEZ LONG   TECHNIQUE: Informed consent obtained. Patient positionedsitting upright. Skin prepped, draped and anesthetized. Under ultrasound guidance, a centesis catheter/needle was advanced into rightpleural cavity.   FINDINGS: A total of 750 cc of clear yellow fluid was aspirated. A sample was sent for analysis. The patient tolerated the procedure well.       Ultrasound-guided left thoracentesis.     Signed by: Sandro Hernandez 12/8/2023 3:01 PM Dictation workstation:   [id number]    XR chest 1 view    Result Date: 12/8/2023  Interpreted By:  Maria Garcia, STUDY: XR CHEST 1 VIEW 12/8/2023 11:57 am   INDICATION: Signs/Symptoms:S/P thoracentesis   COMPARISON: 12/06/2023   ACCESSION NUMBER(S): [id number]   ORDERING CLINICIAN: JEZ LONG   TECHNIQUE: AP erect view of the chest at bedside   FINDINGS: There is  an interval decrease in size of left pleural effusion when compared with the study done 2 days earlier. No pneumothorax is seen.   There is some residual left pleural effusion with infiltrates seen in the left mid and lower lung field. The right lung is clear.   There are surgical clips within the left axilla. Stent grafts are visible in the left subclavian region extending into the left axillary region. A tunneled right jugular dialysis catheter terminates within right atrium.       Decreased size of left effusion following ultrasound-guided thoracentesis with no pneumothorax.   There is some residual left pleural effusion noted with infiltrate in the left mid and lower lung field.   Signed by: Maria Garcia 12/8/2023 12:51 PM Dictation workstation:   [id number]    XR foot 3+ views bilateral    Result Date: 12/7/2023  Interpreted By:  Brendon Hook, STUDY: XR FOOT 3+ VIEWS BILATERAL; 12/7/2023 3:31 pm   INDICATION: Signs/Symptoms:Wound/Abscess/Acute neha process rule out/Comparative Exam.   COMPARISON: None   ACCESSION NUMBER(S): [id number]   ORDERING CLINICIAN: JOSSELYN STAHL   TECHNIQUE: Nonweightbearing AP, lateral and oblique views of the bilateral foot were performed.   FINDINGS: Right foot: Large heel spur is seen. Mild Achilles enthesopathy demonstrated. Moderate atherosclerotic disease is seen. There is suggestion of loss of the arch on this nonweightbearing view. Degenerative change anterior plafond demonstrated and dorsal talus as well as posterior plafond and dorsal talus. Sclerosis anterior calcaneus is seen. Degenerative changes mild between the midfoot and forefoot. 1st metatarsophalangeal joint degenerative changes demonstrated. 2nd ray absent phalanges. Distal phalanges of the 3rd through 5th rays with overlap were difficult to visualize. Distal phalanx of the great toe is difficult to visualize.   Left foot: Large heel spur is seen. Advanced atherosclerotic disease demonstrated. Degenerative  change in the calcaneus and talus seen with moderate anterior and mild posterior plafond degenerative change. Mild degenerative change in the midfoot and forefoot is seen. Distal phalanx of the great toe is absent. Degenerative change of the 2nd ray phalanges and 3rd ray phalanges seen with degenerative change of the 4th and 5th digits not well seen.       1. Right foot: Large heel spur. Atherosclerotic disease. Suggestion of diffuse osteopenia. No sclerosis to suggest osteomyelitis. No subcutaneous air to suggest gangrene. Absent flanges 2nd ray with difficult to visualized distal phalanges of the other digits.   2. Left foot: Large heel spur. Degenerative changes of the hindfoot midfoot and forefoot with atherosclerotic disease. Absent distal phalanx of the great toe. No periostitis or abnormal sclerosis with diffuse osteopenia to suggest osteomyelitis. No subcutaneous air to suggest gangrene.       Signed by: Brendon Hook 12/7/2023 8:39 PM Dictation workstation:   [id number]    ECG 12 Lead    Result Date: 12/7/2023   Poor data quality, interpretation may be adversely affected Normal sinus rhythm Right bundle branch block Septal infarct , age undetermined Possible Lateral infarct , age undetermined Abnormal ECG No previous ECGs available Confirmed by Hammad Callahan ([id number]) on 12/7/2023 1:07:11 PM    XR chest 1 view    Result Date: 12/6/2023  Interpreted By:  Roger Murdock, STUDY: XR CHEST 1 VIEW;  12/6/2023 2:08 pm   INDICATION: Signs/Symptoms:Brief AMS and hypotension, now resolved.   COMPARISON: 08/21/2023..   ACCESSION NUMBER(S): [id number]   ORDERING CLINICIAN: PATEL TINSLEY   FINDINGS: New moderate left pleural effusion. There is silhouetting of the left heart border. Left subclavian stent present. Right-sided double-lumen central venous catheter terminates within the right ventricle. No acute osseous findings.       New, moderate left pleural effusion.     MACRO: None   Signed by: Roger Murdock 12/6/2023 2:18 PM  Dictation workstation:   [id number]    Assessment/Plan   Shock-etiology unclear-on low-dose pressors  Unstageable Bilateral heel ulcers  C-difficile infection-resolving  Type 2 diabetes mellitus with peripheral angiopathy with gangrene   acute respiratory failure-resolved  Coronavirus infection  Pleural effusion, s/p thoracentesis  History of chronic MRSA bacteremia, infective endocarditis-completed IV vancomycin, now on Doxycycline   ESRD on hemodialysis  Severe malnutrition-prealbumin of 3.2           Continue Dificid-total of 10 days  Questran  Oral doxycycline as suppressive therapy for MRSA bacteremia with possible infective endocarditis  Monitor temperature  and WBC  Local care  Offloading  High-protein diet  Monitor stool  Podiatry follow up  Wean off pressors   droplet plus precautions  Contact plus precautions      Stephen Coulter MD

## 2023-12-24 NOTE — CARE PLAN
Problem: Pain - Adult  Goal: Verbalizes/displays adequate comfort level or baseline comfort level  12/23/2023 1949 by Amanda Salinas RN  Outcome: Progressing  12/23/2023 1948 by Amanda Salinas RN  Outcome: Progressing     Problem: Safety - Adult  Goal: Free from fall injury  12/23/2023 1949 by Amanda Salinas RN  Outcome: Progressing  12/23/2023 1948 by Amanda Salinas RN  Outcome: Progressing     Problem: Skin  Goal: Decreased wound size/increased tissue granulation at next dressing change  12/23/2023 1949 by Amanda Salinas RN  Outcome: Progressing  Flowsheets (Taken 12/23/2023 1949)  Decreased wound size/increased tissue granulation at next dressing change:   Protective dressings over bony prominences   Utilize specialty bed per algorithm  12/23/2023 1948 by Amanda Salinas RN  Outcome: Progressing  Goal: Participates in plan/prevention/treatment measures  12/23/2023 1949 by Aamnda Salinas RN  Outcome: Progressing  Flowsheets (Taken 12/23/2023 1949)  Participates in plan/prevention/treatment measures: Elevate heels  12/23/2023 1948 by Amanda Salinas RN  Outcome: Progressing  Goal: Prevent/manage excess moisture  12/23/2023 1949 by Amanda Salinas RN  Outcome: Progressing  Flowsheets (Taken 12/23/2023 1949)  Prevent/manage excess moisture: Moisturize dry skin  12/23/2023 1948 by Amanda Salinas RN  Outcome: Progressing  Goal: Prevent/minimize sheer/friction injuries  12/23/2023 1949 by Amanda Salinas RN  Outcome: Progressing  Flowsheets (Taken 12/23/2023 1949)  Prevent/minimize sheer/friction injuries:   Use pull sheet   HOB 30 degrees or less   Turn/reposition every 2 hours/use positioning/transfer devices  12/23/2023 1948 by Amanda Salinas RN  Outcome: Progressing  Goal: Promote/optimize nutrition  12/23/2023 1949 by Amanda Salinas RN  Outcome: Progressing  Flowsheets (Taken 12/23/2023 1949)  Promote/optimize nutrition: Assist with feeding  12/23/2023 1948 by Amanda Salinas  RN  Outcome: Progressing  Goal: Promote skin healing  12/23/2023 1949 by Amanda Salinas RN  Outcome: Progressing  Flowsheets (Taken 12/23/2023 1949)  Promote skin healing: Turn/reposition every 2 hours/use positioning/transfer devices  12/23/2023 1948 by Amanda Salinas RN  Outcome: Progressing     Problem: Fall/Injury  Goal: Not fall by end of shift  12/23/2023 1949 by Amanda Salinas RN  Outcome: Progressing  12/23/2023 1948 by Amanda Salinas RN  Outcome: Progressing  Goal: Be free from injury by end of the shift  12/23/2023 1949 by Amanda Salinas RN  Outcome: Progressing  12/23/2023 1948 by Amanda Salinas RN  Outcome: Progressing  Goal: Verbalize understanding of personal risk factors for fall in the hospital  12/23/2023 1949 by Amanda Salinas RN  Outcome: Progressing  12/23/2023 1948 by Amanda Salinas RN  Outcome: Progressing  Goal: Verbalize understanding of risk factor reduction measures to prevent injury from fall in the home  12/23/2023 1949 by Amanda Salinas RN  Outcome: Progressing  12/23/2023 1948 by Amanda Salinas RN  Outcome: Progressing  Goal: Pace activities to prevent fatigue by end of the shift  12/23/2023 1949 by Amanda Salinas RN  Outcome: Progressing  12/23/2023 1948 by Amanda Salinas RN  Outcome: Progressing   The patient's goals for the shift include rest    The clinical goals for the shift include wean off pressors, vitally stable    Over the shift, the patient did not make progress toward the following goals. Barriers to progression include patients' current condition  Recommendations to address these barriers include wean from pressors, give due meds

## 2023-12-24 NOTE — CARE PLAN
The patient's goals for the shift include rest    The clinical goals for the shift include stable vitals with pressor weaned down  Nakita Watson RN

## 2023-12-25 NOTE — CARE PLAN
Problem: Diabetes  Goal: Achieve decreasing blood glucose levels by end of shift  Outcome: Progressing  Goal: Increase stability of blood glucose readings by end of shift  Outcome: Progressing  Goal: Maintain electrolyte levels within acceptable range throughout shift  Outcome: Progressing  Goal: Maintain glucose levels >70mg/dl to <250mg/dl throughout shift  Outcome: Progressing  Goal: No changes in neurological exam by end of shift  Outcome: Progressing  Goal: Learn about and adhere to nutrition recommendations by end of shift  Outcome: Progressing  Goal: Vital signs within normal range for age by end of shift  Outcome: Progressing  Goal: Increase self care and/or family involovement by end of shift  Outcome: Progressing  Goal: Receive DSME education by end of shift  Outcome: Progressing     Problem: Pain - Adult  Goal: Verbalizes/displays adequate comfort level or baseline comfort level  Outcome: Progressing     Problem: Safety - Adult  Goal: Free from fall injury  Outcome: Progressing     Problem: Discharge Planning  Goal: Discharge to home or other facility with appropriate resources  Outcome: Progressing     Problem: Chronic Conditions and Co-morbidities  Goal: Patient's chronic conditions and co-morbidity symptoms are monitored and maintained or improved  Outcome: Progressing     Problem: Skin  Goal: Decreased wound size/increased tissue granulation at next dressing change  Outcome: Progressing  Flowsheets (Taken 12/24/2023 1320 by Nakita Watson RN)  Decreased wound size/increased tissue granulation at next dressing change: Promote sleep for wound healing  Goal: Participates in plan/prevention/treatment measures  Outcome: Progressing  Flowsheets (Taken 12/24/2023 2317)  Participates in plan/prevention/treatment measures: Elevate heels  Goal: Prevent/manage excess moisture  Outcome: Progressing  Flowsheets (Taken 12/24/2023 2317)  Prevent/manage excess moisture: Moisturize dry skin  Goal:  Prevent/minimize sheer/friction injuries  Outcome: Progressing  Flowsheets (Taken 12/24/2023 2317)  Prevent/minimize sheer/friction injuries:   Increase activity/out of bed for meals   Turn/reposition every 2 hours/use positioning/transfer devices  Goal: Promote/optimize nutrition  Outcome: Progressing  Flowsheets (Taken 12/24/2023 2317)  Promote/optimize nutrition: Offer water/supplements/favorite foods  Goal: Promote skin healing  Outcome: Progressing  Flowsheets (Taken 12/24/2023 2317)  Promote skin healing:   Protective dressings over bony prominences   Turn/reposition every 2 hours/use positioning/transfer devices     Problem: Fall/Injury  Goal: Not fall by end of shift  Outcome: Progressing  Goal: Be free from injury by end of the shift  Outcome: Progressing  Goal: Verbalize understanding of personal risk factors for fall in the hospital  Outcome: Progressing  Goal: Verbalize understanding of risk factor reduction measures to prevent injury from fall in the home  Outcome: Progressing  Goal: Pace activities to prevent fatigue by end of the shift  Outcome: Progressing     Problem: Nutrition  Goal: Oral intake greater than 50%  Outcome: Progressing  Goal: Oral intake greater 75%  Outcome: Progressing  Goal: Consume prescribed supplement  Outcome: Progressing  Goal: Adequate PO fluid intake  Outcome: Progressing  Goal: Nutrition support goals are met within 48 hrs  Outcome: Progressing  Goal: Nutrition support is meeting 75% of nutrient needs  Outcome: Progressing  Goal: Lab values WNL  Outcome: Progressing  Goal: Electrolytes WNL  Outcome: Progressing  Goal: Promote healing  Outcome: Progressing  Goal: Maintain stable weight  Outcome: Progressing  Goal: Reduce weight from edema/fluid  Outcome: Progressing  Goal: Gradual weight gain  Outcome: Progressing     Problem: Respiratory  Goal: Clear secretions with interventions this shift  Outcome: Progressing  Goal: Minimize anxiety/maximize coping throughout  shift  Outcome: Progressing  Goal: Minimal/no exertional discomfort or dyspnea this shift  Outcome: Progressing  Goal: No signs of respiratory distress (eg. Use of accessory muscles. Peds grunting)  Outcome: Progressing  Goal: Patent airway maintained this shift  Outcome: Progressing  Goal: Verbalize decreased shortness of breath this shift  Outcome: Progressing  Goal: Wean oxygen to maintain O2 saturation per order/standard this shift  Outcome: Progressing     Problem: Pain  Goal: My pain/discomfort is manageable  Outcome: Progressing     Problem: Safety  Goal: Patient will be injury free during hospitalization  Outcome: Progressing  Goal: I will remain free of falls  Outcome: Progressing     Problem: Daily Care  Goal: Daily care needs are met  Outcome: Progressing     Problem: Psychosocial Needs  Goal: Demonstrates ability to cope with hospitalization/illness  Outcome: Progressing  Goal: Collaborate with me, my family, and caregiver to identify my specific goals  Outcome: Progressing     Problem: Discharge Barriers  Goal: My discharge needs are met  Outcome: Progressing   The patient's goals for the shift include rest    The clinical goals for the shift include Wean from pressors    Over the shift, the patient did not make progress toward the following goals. Barriers to progression include anxiety. Recommendations to address these barriers include rest. And reassurance.

## 2023-12-25 NOTE — PROGRESS NOTES
Ayanna Gross is a 73 y.o. female on day 19 of admission presenting with Hypotension, unspecified hypotension type.      Subjective   Patient has been afebrile during the night.  She denied chest pain or shortness of breath.  She remains on low-dose of pressors because of low blood pressure.         Objective     Last Recorded Vitals  BP (!) 77/34   Pulse 105   Temp 36.8 °C (98.2 °F) (Axillary)   Resp 19   Wt 73.1 kg (161 lb 2.5 oz)   SpO2 94%   Intake/Output last 3 Shifts:    Intake/Output Summary (Last 24 hours) at 12/25/2023 0916  Last data filed at 12/25/2023 0754  Gross per 24 hour   Intake 1088.94 ml   Output --   Net 1088.94 ml       Admission Weight  Weight: 72.6 kg (160 lb) (12/06/23 1301)    Daily Weight  12/24/23 : 73.1 kg (161 lb 2.5 oz)    Image Results      Physical Exam  General: Pleasant, cooperating during physical exam.  HEENT: Pupils are equal and reactive to light and commendation , oral mucosa moist, no JVD .  Cardiovascular: Normal sinus rhythm, no MRG.  Lungs: Clear to auscultation bilaterally, no wheezing, no crackles, no dullness to percussion.  Abdomen: No hepatosplenomegaly appreciated, soft , not tender, positive bowel sounds, positive bowel movement.  Neuro: Alert and oriented x2, strength in upper and lower extremities , sensation intact.  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     Assessment/Plan      Septic shock  Unstageable bilateral heel ulcer  Positive for C. difficile colitis.  History of chronic MRSA bacteremia, infective endocarditis.  Continue with doxycycline.  Patient completed full course of IV vancomycin  Continue gently with pressors  Patient remains on low-dose of pressors.  On her baseline  patient has low blood pressure.  Dr. Mason  on the case    End stage renal disease   On dialysis  Dr. Loaiza on the case.    Hypertension  Continue with midodrine and low-dose of pressors.    C. difficile colitis  ID on the case  Patient is on fidaxomicin.    Gout    Anemia of chronic disease    Pleural effusion  Status post thoracocentesis  Patient was found to have transudative pleural effusion  Pulmonary on the case    COVID-19 infection  Patient is not hypoxic.  Continue with contact isolation and droplet precaution.    Encephalopathy.  Clinically improved.    Patient is at high risk for deterioration-titrate arrhythmias.    Diabetes mellitus type II.  cover with insulin sliding scale  CBC and CMP in AM.    Time spent with patient 35 minutes  Discussed in detail with at bedside.      Principal Problem:    Hypotension, unspecified hypotension type  Active Problems:    Paroxysmal atrial fibrillation (CMS/HCC)    Peripheral vascular disease (CMS/HCC)    Orthostatic hypotension    Mixed hyperlipidemia    End-stage renal disease on hemodialysis (CMS/HCC)    DM (diabetes mellitus) (CMS/HCC)    Absent pedal pulses                  Veronica Yepez MD

## 2023-12-25 NOTE — CARE PLAN
The patient's goals for the shift include rest    The clinical goals for the shift include Wean from pressors    Over the shift, the patient did not make progress toward the following goals. Barriers to progression include . Recommendations to address these barriers include .

## 2023-12-26 NOTE — NURSING NOTE
Patient with Rt chest permacath, dressing D&I, Lt femoral TLC, dressing D&I, 2 lumens in use, other lumen clamped and curos cap applied. Rt femoral Suring site dressing change done, insertion site with some redness, and tan appearing open areas, no drainage noted but dressing was moist and coming off. Groin area also with excoriation, patient with rectal tube and also incontinent of stool. FLAQUITO Garcia aware of Chandni site and incontinence. She will contact primary and make aware of Suring site appearance.

## 2023-12-26 NOTE — PROGRESS NOTES
Ayanna Gross is a 73 y.o. female on day 20 of admission presenting with Hypotension, unspecified hypotension type.    Subjective   Interval History:   Room not entered-limit exposure  Patient d/w nurse   Patient observed through window   Still on pressors   Afebrile         Review of Systems    Objective   Range of Vitals (last 24 hours)  Heart Rate:  []   Temp:  [36 °C (96.8 °F)-36.8 °C (98.2 °F)]   Resp:  [1-25]   Weight:  [69.4 kg (153 lb)-73.1 kg (161 lb 2.5 oz)]   SpO2:  [79 %-100 %]   Daily Weight  12/26/23 : 69.4 kg (153 lb)    Body mass index is 27.98 kg/m².    Physical Exam  Resting comfortably     Antibiotics  sodium chloride 0.9 % bolus 500 mL  cefepime (Maxipime) 1 g in dextrose 5 % 50 mL IV  vancomycin-diluent combo no.1 (Xellia) IVPB 1 g  apixaban (Eliquis) tablet 2.5 mg  calcium acetate (Phoslo) capsule 2,000 mg  escitalopram (Lexapro) tablet 10 mg  febuxostat (Uloric) tablet 40 mg  fenofibrate (Triglide) tablet 160 mg  gabapentin (Neurontin) capsule 300 mg  metoprolol succinate XL (Toprol-XL) 24 hr tablet 12.5 mg  midodrine (Proamatine) tablet 10 mg  nystatin (Mycostatin) 100,000 unit/gram powder 1 Application  B complex-vitamin C tablet 1 tablet  simvastatin (Zocor) tablet 40 mg  acetaminophen (Tylenol) tablet 650 mg  acetaminophen (Tylenol) tablet 650 mg  polyethylene glycol (Glycolax, Miralax) packet 17 g  dextrose 50 % injection 25 g  glucagon (Glucagen) injection 1 mg  dextrose 10 % in water (D10W) infusion  insulin lispro (HumaLOG) injection 0-5 Units  zinc oxide 20 % ointment 1 Application  calcium acetate (Phoslo) capsule 667 mg  vancomycin (Vancocin) capsule 125 mg  lidocaine PF (Xylocaine) 10 mg/mL (1 %) injection  heparin 1,000 unit/mL injection 2,000 Units  heparin 1,000 unit/mL injection 2,000 Units  midodrine (Proamatine) tablet 15 mg  albumin human 25 % solution 25 g  polyethylene glycol (Glycolax, Miralax) packet 17 g  collagenase 250 unit/gram ointment  vancomycin (Vancocin)  1,750 mg in dextrose 5 % in water (D5W) 250 mL IV  vancomycin-diluent combo no.1 (Xellia) IVPB 1,750 mg  albumin human 25 % solution 12.5 g  heparin 1,000 unit/mL injection 2,000 Units  heparin 1,000 unit/mL injection 2,000 Units  vancomycin (Vancocin) placeholder  doxycycline (Vibramycin) capsule 100 mg  potassium chloride CR (Klor-Con) ER tablet 10 mEq  gabapentin (Neurontin) capsule 100 mg  heparin 1,000 unit/mL injection 2,000 Units  heparin 1,000 unit/mL injection 2,000 Units  piperacillin-tazobactam-dextrose (Zosyn) IV 2.25 g  doxycycline (Vibramycin) capsule 100 mg  ipratropium-albuteroL (Duo-Neb) 0.5-2.5 mg/3 mL nebulizer solution 3 mL  heparin 1,000 unit/mL injection 2,000 Units  heparin 1,000 unit/mL injection 2,000 Units  ipratropium-albuteroL (Duo-Neb) 0.5-2.5 mg/3 mL nebulizer solution 3 mL  dexAMETHasone (Decadron) injection 6 mg  albumin human 25 % solution 25 g  epoetin di-epbx (Retacrit) injection 10,000 Units  lidocaine PF (Xylocaine) 20 mg/mL (2 %) injection  potassium chloride CR (Klor-Con) ER tablet 10 mEq  albumin human 5 % infusion 25 g  potassium chloride CR (Klor-Con M20) ER tablet 20 mEq  norepinephrine (Levophed) 8 mg in dextrose 5% 250 mL (0.032 mg/mL) infusion (premix)  oxygen (O2) therapy  piperacillin-tazobactam-dextrose (Zosyn) IV 2.25 g  heparin 1,000 unit/mL injection 2,000 Units  heparin 1,000 unit/mL injection 2,000 Units  potassium chloride 20 mEq in 100 mL IV premix  fidaxomicin (Dificid) tablet 200 mg  norepinephrine (Levophed) 8 mg in dextrose 5% 250 mL (0.032 mg/mL) infusion (premix)  heparin 1,000 unit/mL injection 2,000 Units  heparin 1,000 unit/mL injection 2,000 Units  HYDROcodone-acetaminophen (Norco) 5-325 mg per tablet 1 tablet  dextrose 50 % injection 25 g  glucagon (Glucagen) injection 1 mg  dextrose 10 % in water (D10W) infusion  potassium chloride (Klor-Con) packet 20 mEq  potassium chloride 20 mEq in 100 mL IV premix  cosyntropin (Cortrosyn) injection 250  mcg  heparin 1,000 unit/mL injection 2,000 Units  heparin 1,000 unit/mL injection 2,000 Units  heparin 1,000 unit/mL injection 2,000 Units  heparin 1,000 unit/mL injection 2,000 Units  cholestyramine (Questran) 4 gram packet 4 g  potassium phosphate (monobasic) (K-Phos) tablet 500 mg      Relevant Results  Labs  Results from last 72 hours   Lab Units 12/26/23 0439 12/25/23 0409 12/24/23 0229   WBC AUTO x10*3/uL 13.4* 14.0* 12.7*   HEMOGLOBIN g/dL 9.3* 9.6* 9.5*   HEMATOCRIT % 27.0* 29.5* 26.5*   PLATELETS AUTO x10*3/uL 221 201 218   NEUTROS PCT AUTO % 65.9 68.7 63.2   LYMPHS PCT AUTO % 18.7 16.6 21.6   MONOS PCT AUTO % 8.9 8.7 8.0   EOS PCT AUTO % 4.8 4.1 5.4     Results from last 72 hours   Lab Units 12/26/23 0439 12/25/23 0409 12/24/23 0229   SODIUM mmol/L 132* 135 136   POTASSIUM mmol/L 3.7 3.7 4.0   CHLORIDE mmol/L 98 98 98   CO2 mmol/L 24 27 29   BUN mg/dL 15 8 16   CREATININE mg/dL 3.10* 2.00* 3.20*   GLUCOSE mg/dL 138* 162* 139*   CALCIUM mg/dL 8.1* 8.2* 8.8   ANION GAP mmol/L 10 10 9   EGFR mL/min/1.73m*2 16* 26* 15*   PHOSPHORUS mg/dL 3.0 1.7* 1.9*     Results from last 72 hours   Lab Units 12/26/23 0439 12/25/23 0409 12/24/23 0229   ALK PHOS U/L 108  --   --    BILIRUBIN TOTAL mg/dL 1.1  --   --    PROTEIN TOTAL g/dL 5.5*  --   --    ALT U/L 7  --   --    AST U/L 14  --   --    ALBUMIN g/dL 2.5* 2.8* 2.6*     Estimated Creatinine Clearance: 15.2 mL/min (A) (by C-G formula based on SCr of 3.1 mg/dL (H)).  C-Reactive Protein   Date Value Ref Range Status   12/25/2023 5.20 (H) 0.00 - 2.00 mg/dL Final     CRP   Date Value Ref Range Status   06/23/2023 19.9 (H) 0 - 2.0 MG/DL Final     Comment:     Performed at [address] [address]   05/22/2022 1.0 0 - 2.0 MG/DL Final     Comment:     Performed at [address] [address]     Microbiology  Reviewed   Imaging  XR chest 1 view    Result Date: 12/26/2023  Interpreted By:  Rikki Patel, STUDY: XR CHEST 1  VIEW; 12/26/2023 12:23 pm   INDICATION: Signs/Symptoms:hypoxia   COMPARISON: 12/19/2023   ACCESSION NUMBER(S): [id number]   ORDERING CLINICIAN: BRITTANI TOLEDO   FINDINGS: The study is limited due to rotation. Dialysis catheter is stable in position. The cardiac silhouette is indeterminate due to the technique. There is interval significant worsening of large left effusion and left lung atelectasis/infiltrates, with complete opacification of the left hemithorax. There is no pneumothorax. Vascular stents are noted in subclavian vessels. Surgical staples are again seen in the left axilla. The osseous structures are unchanged.       Limited study. Interval worsening of large left pleural effusion and left lung infiltrates/atelectasis, with complete opacification of the left hemithorax.   Signed by: Rikki Patel 12/26/2023 12:40 PM Dictation workstation:   [id number]    US thoracentesis    Result Date: 12/20/2023  Interpreted By:  Todd La, STUDY: US THORACENTESIS;  12/15/2023 3:43 pm   INDICATION: Signs/Symptoms:L pleural effusion.   COMPARISON: Chest x-rayDated 12/14/2023.   ACCESSION NUMBER(S): [id number]   ORDERING CLINICIAN: PARIS LUCAS   TECHNIQUE: INTERVENTIONALIST(S): Todd La M.D.   CONSENT: The patient/patient's POA/next of kin was informed of the nature of the proposed procedure. The purposes, alternatives, risks, and benefits were explained and discussed. All questions were answered and consent was obtained.   SEDATION: None   MEDICATION/CONTRAST: No additional   TIME OUT: A time out was performed immediately prior to procedure start with the interventional team, correctly identifying the patient name, date of birth, MRN, procedure, anatomy (including marking of site and side), patient position, procedure consent form, relevant laboratory and imaging test results, antibiotic administration, safety precautions, and procedure-specific equipment needs.   FINDINGS: The patient was placed in  the sitting position.   The pleural space was examined with grey scale ultrasound, and the most accessible fluid identified and marked for thoracentesis.   The skin was prepped and draped in usual manner. Local anesthesia with Lidocaine was administered and a  left-sided thoracentesis was performed.  A 5 Albanian One-Step thoracentesis needle/catheter was then placed where marked.  Approximately 600 mL of yellowish colored fluid was removed.  The needle/catheter was then withdrawn.   The patient tolerated the procedure well and there were no immediate complications. Specimen(s) sent to the laboratory and pathology for further evaluation, per the requesting team.       Uneventful  left-sided thoracentesis, as detailed above.   I personally performed and/or directly supervised this study and was present for the entire procedure.   I personally reviewed the study and resident interpretation. I agree with the findings as stated.   Performed and dictated at [facility].   MACRO: None   Signed by: Todd La 12/20/2023 9:48 PM Dictation workstation:   [id number]    XR chest 1 view    Result Date: 12/19/2023  Interpreted By:  Judd Lee, STUDY: XR CHEST 1 VIEW; 12/19/2023 5:44 am   INDICATION: Signs/Symptoms:pleural effusion.   COMPARISON: December 14, 2023   ACCESSION NUMBER(S): [id number]   ORDERING CLINICIAN: PARIS LUCAS   FINDINGS: RESULT: Right sided central venous catheter is again noted extending into the right atrium. The distal tip is not well visualized. Left-sided vascular stents are noted in the left subclavian region. Left axillary surgical clips are noted. The cardiac silhouette is within normal limits for size. Mediastinal contours are unremarkable. Left basilar opacity obscures the left hemidiaphragm with hazy opacity throughout the left lung. There are degenerative changes of the thoracic spine and shoulders.       Persistent left basilar opacity obscures  the left hemidiaphragm with hazy opacity extending through the left hemithorax. Findings likely represent pleural effusion atelectasis. Underlying infiltrate or mass is not excluded. Overall findings not significantly changed compared to previous exam     Signed by: Judd Lee 12/19/2023 9:02 AM Dictation workstation:   [id number]    XR chest 1 view    Result Date: 12/14/2023  Interpreted By:  Judd Lee, STUDY: XR CHEST 1 VIEW; 12/14/2023 10:43 am   INDICATION: Hypoxic respiratory failure.   COMPARISON: December 8, 2023   ACCESSION NUMBER(S): [id number]   ORDERING CLINICIAN: RODRI SURESH   FINDINGS: RESULT: Right-sided double-lumen dialysis catheter is noted with distal tip at the level of the right atrium. Vascular stents are noted in the left subclavian region. Surgical clips are noted in the left axilla. Cardiac silhouette size is indeterminate as the left heart border is obscured. There are calcifications involving the thoracic aorta. Left basilar obscurity obscures the left hemidiaphragm and left heart border with hazy opacity extending to the left mid lung. There are degenerative changes of the bilateral shoulders.       Increasing density in the left mid and lower lung suggests increasing infiltrate or pleural effusion.     Signed by: Judd Lee 12/14/2023 10:58 AM Dictation workstation:   [id number]    Vascular US ankle brachial index (OCTAVIO) without exercise    Result Date: 12/13/2023           [place] [address]            Phone [phone]  Vascular Lab Report  [facility] US ANKLE BRACHIAL INDEX (OCTAVIO) WITHOUT EXERCISE Patient Name:      BASIA Gray Physician: [id number] Lizet Messer MD Study Date:        12/11/2023          Ordering Provider: [id number] MARIANN SUTTON MRN/PID:           [id number]            Fellow: Accession#:        [id number]        Technologist:       Luz Maria Trivedi RVT Date of Birth/Age: 1950 / 73 years Technologist 2:    Dara Parr RVT Gender:            F                   Encounter#:        [id number] Admission Status:  Inpatient           Location           [facility]                                        Performed:  Diagnosis/ICD: Other specified symptoms and signs involving the circulatory and                respiratory systems-R09.89 Indication:    Peripheral vascular disease CPT Codes:     12543.52 Peripheral artery OCTAVIO Only Reduced Service  Pertinent History: Absent pedal pulses. Patient is on dialysis.  CONCLUSIONS: Right Lower PVR: Evidence of moderate arterial occlusive disease in the right lower extremity at rest. Right pressures of >220 mmHg suggest no compressibility of vessels and may make absolute Segmental Limb Pressures (SLP) unreliable. Decreased digital perfusion noted. Biphasic flow is noted in the right dorsalis pedis artery. Left Lower PVR: Evidence of moderate arterial occlusive disease in the left lower extremity at rest. Left pressures of >220 mmHg suggest no compressibility of vessels and may make absolute Segmental Limb Pressures (SLP) unreliable. Decreased digital perfusion noted. Biphasic flow is noted in the left dorsalis pedis artery. Additional Findings: Technically difficult and limited exam due to patient's positioning, movement and inability to cooperate with exam.  Imaging & Doppler Findings:  RIGHT Lower PVR              Pressures Right Dorsalis Pedis (Ankle) 255 mmHg   LEFT Lower PVR              Pressures Left Dorsalis Pedis (Ankle) 255 mmHg   [id number] Lizet Messer MD Electronically signed by [id number] Lizet Messer MD on 12/13/2023 at 8:38:56 AM  ** Final **     CT head wo IV contrast    Result Date: 12/11/2023  Interpreted By:  Brendon Hook, STUDY: CT HEAD WO IV CONTRAST; 12/11/2023 5:20 pm   INDICATION: Signs/Symptoms:acute confusion.   COMPARISON: 20 August 2023   ACCESSION NUMBER(S): [id number]    ORDERING CLINICIAN: RODRI SURESH   TECHNIQUE: CT was performed with one or more of the following dose reduction techniques: automated exposure control, adjustment of the mA and/or kV according to patient size, or use of iterative reconstruction technique.       PROCEDURE: 3.0 mm axial images were obtained through the brain, to include the posterior fossa without intravenous contrast enhancement.   FINDINGS: The patient's head is turned to the left and slightly canted in the gantry. Mild motion artifact is seen. There is symmetric volume loss of the cerebral hemispheres. Moderate  decreased attenuation in the bilateral periventricular white matter, consistent with microangiopathy is noted.  There is no encephalomalacic change. Brainstem is unremarkable. Cerebellar hemispheres are symmetric. No subarachnoid, intraparenchymal, subdural or interventricular hemorrhage. No intra- or extra-axial mass or abnormal blood products are demonstrated. Hyperostosis frontalis interna is seen. Slight hypoplasia of the left mastoid.   The paranasal sinuses and mastoids as well as calvarium are unremarkable.       1. No acute intracranial findings. 2. Symmetric volume loss of the cerebral hemispheres. 3. Periventricular Microangiopathy. 4. No posttraumatic abnormality.   This report has been produced using speech recognition. This exam is available in DICOM format to non-affiliated healthcare facilities on a secure media free searchable basis with prior patient authorization. The patient exposure is reported to a radiation dose index registry. All CT examinations are performed with one or more of the following dose reduction techniques: Automated Exposure Control, Adjustment of mA and/or KV according to patient size, or use of iterative reconstruction techniques.   MACRO: None   Signed by: Brendon Hook 12/11/2023 7:05 PM Dictation workstation:   [id number]     thoracentesis    Result Date: 12/8/2023  Interpreted By:  David  Sandro, STUDY: US THORACENTESIS; 12/7/2023 3:16 pm   INDICATION: Signs/Symptoms:Left pleural effusion question of whether this needs thoracentesis, history of ESRD, just had dialysis today.   COMPARISON: None   ACCESSION NUMBER(S): [id number]   ORDERING CLINICIAN: JEZ LONG   TECHNIQUE: Informed consent obtained. Patient positionedsitting upright. Skin prepped, draped and anesthetized. Under ultrasound guidance, a centesis catheter/needle was advanced into rightpleural cavity.   FINDINGS: A total of 750 cc of clear yellow fluid was aspirated. A sample was sent for analysis. The patient tolerated the procedure well.       Ultrasound-guided left thoracentesis.     Signed by: Sandro Hernandez 12/8/2023 3:01 PM Dictation workstation:   [id number]    XR chest 1 view    Result Date: 12/8/2023  Interpreted By:  Maria Garcia, STUDY: XR CHEST 1 VIEW 12/8/2023 11:57 am   INDICATION: Signs/Symptoms:S/P thoracentesis   COMPARISON: 12/06/2023   ACCESSION NUMBER(S): [id number]   ORDERING CLINICIAN: JEZ LONG   TECHNIQUE: AP erect view of the chest at bedside   FINDINGS: There is an interval decrease in size of left pleural effusion when compared with the study done 2 days earlier. No pneumothorax is seen.   There is some residual left pleural effusion with infiltrates seen in the left mid and lower lung field. The right lung is clear.   There are surgical clips within the left axilla. Stent grafts are visible in the left subclavian region extending into the left axillary region. A tunneled right jugular dialysis catheter terminates within right atrium.       Decreased size of left effusion following ultrasound-guided thoracentesis with no pneumothorax.   There is some residual left pleural effusion noted with infiltrate in the left mid and lower lung field.   Signed by: Maria Garcia 12/8/2023 12:51 PM Dictation workstation:   [id number]    XR foot 3+ views bilateral    Result Date: 12/7/2023  Interpreted By:   Brendon Hook, STUDY: XR FOOT 3+ VIEWS BILATERAL; 12/7/2023 3:31 pm   INDICATION: Signs/Symptoms:Wound/Abscess/Acute neha process rule out/Comparative Exam.   COMPARISON: None   ACCESSION NUMBER(S): [id number]   ORDERING CLINICIAN: JOSSELYN STAHL   TECHNIQUE: Nonweightbearing AP, lateral and oblique views of the bilateral foot were performed.   FINDINGS: Right foot: Large heel spur is seen. Mild Achilles enthesopathy demonstrated. Moderate atherosclerotic disease is seen. There is suggestion of loss of the arch on this nonweightbearing view. Degenerative change anterior plafond demonstrated and dorsal talus as well as posterior plafond and dorsal talus. Sclerosis anterior calcaneus is seen. Degenerative changes mild between the midfoot and forefoot. 1st metatarsophalangeal joint degenerative changes demonstrated. 2nd ray absent phalanges. Distal phalanges of the 3rd through 5th rays with overlap were difficult to visualize. Distal phalanx of the great toe is difficult to visualize.   Left foot: Large heel spur is seen. Advanced atherosclerotic disease demonstrated. Degenerative change in the calcaneus and talus seen with moderate anterior and mild posterior plafond degenerative change. Mild degenerative change in the midfoot and forefoot is seen. Distal phalanx of the great toe is absent. Degenerative change of the 2nd ray phalanges and 3rd ray phalanges seen with degenerative change of the 4th and 5th digits not well seen.       1. Right foot: Large heel spur. Atherosclerotic disease. Suggestion of diffuse osteopenia. No sclerosis to suggest osteomyelitis. No subcutaneous air to suggest gangrene. Absent flanges 2nd ray with difficult to visualized distal phalanges of the other digits.   2. Left foot: Large heel spur. Degenerative changes of the hindfoot midfoot and forefoot with atherosclerotic disease. Absent distal phalanx of the great toe. No periostitis or abnormal sclerosis with diffuse osteopenia to  suggest osteomyelitis. No subcutaneous air to suggest gangrene.       Signed by: Brendon Hook 12/7/2023 8:39 PM Dictation workstation:   [id number]    ECG 12 Lead    Result Date: 12/7/2023   Poor data quality, interpretation may be adversely affected Normal sinus rhythm Right bundle branch block Septal infarct , age undetermined Possible Lateral infarct , age undetermined Abnormal ECG No previous ECGs available Confirmed by Hammad Callahan ([id number]) on 12/7/2023 1:07:11 PM    XR chest 1 view    Result Date: 12/6/2023  Interpreted By:  Roger Murdock, STUDY: XR CHEST 1 VIEW;  12/6/2023 2:08 pm   INDICATION: Signs/Symptoms:Brief AMS and hypotension, now resolved.   COMPARISON: 08/21/2023..   ACCESSION NUMBER(S): [id number]   ORDERING CLINICIAN: PATEL TINSLEY   FINDINGS: New moderate left pleural effusion. There is silhouetting of the left heart border. Left subclavian stent present. Right-sided double-lumen central venous catheter terminates within the right ventricle. No acute osseous findings.       New, moderate left pleural effusion.     MACRO: None   Signed by: Roger Murdock 12/6/2023 2:18 PM Dictation workstation:   [id number]     Assessment/Plan     Shock-etiology unclear-on low-dose pressors  Unstageable Bilateral heel ulcers  C-difficile infection-resolving  Type 2 diabetes mellitus with peripheral angiopathy with gangrene   acute respiratory failure-resolved  Coronavirus infection  Pleural effusion, s/p thoracentesis  History of chronic MRSA bacteremia, infective endocarditis-completed IV vancomycin, now on Doxycycline   ESRD on hemodialysis  Severe malnutrition-prealbumin of 3.2           Continue Dificid-total of 10 days-stop on 12/27/2023   Questran  Oral doxycycline as suppressive therapy for MRSA bacteremia with possible infective endocarditis  Monitor temperature  and WBC  Local care  Offloading  High-protein diet  Monitor stool  Podiatry follow up  Wean off pressors   droplet plus precautions  Contact plus  precautions    Stephen Coulter MD

## 2023-12-26 NOTE — PROGRESS NOTES
Ayanna Gross is a 73 y.o. female on day 20 of admission presenting with Hypotension, unspecified hypotension type.      Subjective   No new issues.  Breathing better.       Scheduled medications  apixaban, 2.5 mg, oral, BID  cholestyramine, 4 g, oral, Daily with evening meal  collagenase, , Topical, Daily  doxycycline, 100 mg, oral, Daily  epoetin di or biosimilar, 10,000 Units, intravenous, Every Mon/Wed/Fri  escitalopram, 10 mg, oral, Daily  febuxostat, 40 mg, oral, Every other day  fenofibrate, 160 mg, oral, Daily  fidaxomicin, 200 mg, oral, BID  gabapentin, 100 mg, oral, BID  heparin, 2,000 Units, intra-catheter, After Dialysis  heparin, 2,000 Units, intra-catheter, After Dialysis  heparin, 2,000 Units, intra-catheter, After Dialysis  heparin, 2,000 Units, intra-catheter, After Dialysis  heparin, 2,000 Units, intra-catheter, After Dialysis  insulin lispro, 0-5 Units, subcutaneous, TID with meals  ipratropium-albuteroL, 3 mL, nebulization, TID  [Held by provider] metoprolol succinate XL, 12.5 mg, oral, Daily  midodrine, 15 mg, oral, TID with meals  nystatin, 1 Application, Topical, BID  simvastatin, 40 mg, oral, Nightly      Continuous medications  norepinephrine, 0.01-3 mcg/kg/min, Last Rate: 0.06 mcg/kg/min (12/26/23 0930)      PRN medications  PRN medications: acetaminophen, acetaminophen, dextrose 10 % in water (D10W), dextrose, glucagon, HYDROcodone-acetaminophen, oxygen, polyethylene glycol, zinc oxide      Objective      Vitals 24HR  Heart Rate:  []   Temp:  [36 °C (96.8 °F)-36.8 °C (98.2 °F)]   Resp:  [1-26]   Weight:  [69.4 kg (153 lb)-73.1 kg (161 lb 2.5 oz)]   SpO2:  [79 %-100 %]     General: elderly woman, not in distress  Access: TDC in place  Lungs: clear bilaterally  Heart: S1 and S2, regular  Abdomen: soft, non tender  Extremities: no pedal edema  Neuro: awake and interactive      Intake/Output last 3 Shifts:    Intake/Output Summary (Last 24 hours) at 12/26/2023 1237  Last data filed at  12/26/2023 0621  Gross per 24 hour   Intake 86.4 ml   Output --   Net 86.4 ml       Relevant Results    Results for orders placed or performed during the hospital encounter of 12/06/23 (from the past 24 hour(s))   POCT GLUCOSE   Result Value Ref Range    POCT Glucose 174 (H) 74 - 99 mg/dL   POCT GLUCOSE   Result Value Ref Range    POCT Glucose 150 (H) 74 - 99 mg/dL   CBC and Auto Differential   Result Value Ref Range    WBC 13.4 (H) 4.4 - 11.3 x10*3/uL    nRBC 0.0 0.0 - 0.0 /100 WBCs    RBC 3.23 (L) 4.00 - 5.20 x10*6/uL    Hemoglobin 9.3 (L) 12.0 - 16.0 g/dL    Hematocrit 27.0 (L) 36.0 - 46.0 %    MCV 84 80 - 100 fL    MCH 28.8 26.0 - 34.0 pg    MCHC 34.4 32.0 - 36.0 g/dL    RDW 23.2 (H) 11.5 - 14.5 %    Platelets 221 150 - 450 x10*3/uL    Neutrophils % 65.9 40.0 - 80.0 %    Immature Granulocytes %, Automated 0.7 0.0 - 0.9 %    Lymphocytes % 18.7 13.0 - 44.0 %    Monocytes % 8.9 2.0 - 10.0 %    Eosinophils % 4.8 0.0 - 6.0 %    Basophils % 1.0 0.0 - 2.0 %    Neutrophils Absolute 8.87 (H) 1.60 - 5.50 x10*3/uL    Immature Granulocytes Absolute, Automated 0.09 0.00 - 0.50 x10*3/uL    Lymphocytes Absolute 2.51 0.80 - 3.00 x10*3/uL    Monocytes Absolute 1.19 (H) 0.05 - 0.80 x10*3/uL    Eosinophils Absolute 0.65 (H) 0.00 - 0.40 x10*3/uL    Basophils Absolute 0.13 (H) 0.00 - 0.10 x10*3/uL   Comprehensive metabolic panel   Result Value Ref Range    Glucose 138 (H) 65 - 99 mg/dL    Sodium 132 (L) 133 - 145 mmol/L    Potassium 3.7 3.4 - 5.1 mmol/L    Chloride 98 97 - 107 mmol/L    Bicarbonate 24 24 - 31 mmol/L    Urea Nitrogen 15 8 - 25 mg/dL    Creatinine 3.10 (H) 0.40 - 1.60 mg/dL    eGFR 16 (L) >60 mL/min/1.73m*2    Calcium 8.1 (L) 8.5 - 10.4 mg/dL    Albumin 2.5 (L) 3.5 - 5.0 g/dL    Alkaline Phosphatase 108 35 - 125 U/L    Total Protein 5.5 (L) 5.9 - 7.9 g/dL    AST 14 5 - 40 U/L    Bilirubin, Total 1.1 0.1 - 1.2 mg/dL    ALT 7 5 - 40 U/L    Anion Gap 10 <=19 mmol/L   Phosphorus   Result Value Ref Range    Phosphorus 3.0  2.5 - 4.5 mg/dL   Morphology   Result Value Ref Range    RBC Morphology See Below     Polychromasia Mild     Target Cells Many     Basophilic Stippling Present     Clumped Platelets Present    POCT GLUCOSE   Result Value Ref Range    POCT Glucose 136 (H) 74 - 99 mg/dL   POCT GLUCOSE   Result Value Ref Range    POCT Glucose 191 (H) 74 - 99 mg/dL       Assessment/Plan      ESRD on HD  Hypotension, on midodrine  Hypophosphatemia, repleted  Anemia in CKD, at goal    Last dialyzed on Sunday and will have next IHD tomorrow.    Continue other care.    Dr. Mondragon will resume service tomorrow.      Raj Mcgee MD

## 2023-12-26 NOTE — CARE PLAN
The patient's goals for the shift include rest    The clinical goals for the shift include hemodynamically stable      Problem: Diabetes  Goal: Achieve decreasing blood glucose levels by end of shift  Outcome: Progressing  Goal: Increase stability of blood glucose readings by end of shift  Outcome: Progressing  Goal: Maintain electrolyte levels within acceptable range throughout shift  Outcome: Progressing  Goal: Maintain glucose levels >70mg/dl to <250mg/dl throughout shift  Outcome: Progressing  Goal: No changes in neurological exam by end of shift  Outcome: Progressing  Goal: Learn about and adhere to nutrition recommendations by end of shift  Outcome: Progressing  Goal: Vital signs within normal range for age by end of shift  Outcome: Progressing  Goal: Increase self care and/or family involovement by end of shift  Outcome: Progressing  Goal: Receive DSME education by end of shift  Outcome: Progressing     Problem: Pain - Adult  Goal: Verbalizes/displays adequate comfort level or baseline comfort level  Outcome: Progressing     Problem: Safety - Adult  Goal: Free from fall injury  Outcome: Progressing     Problem: Discharge Planning  Goal: Discharge to home or other facility with appropriate resources  Outcome: Progressing     Problem: Chronic Conditions and Co-morbidities  Goal: Patient's chronic conditions and co-morbidity symptoms are monitored and maintained or improved  Outcome: Progressing     Problem: Skin  Goal: Decreased wound size/increased tissue granulation at next dressing change  Outcome: Progressing  Goal: Participates in plan/prevention/treatment measures  Outcome: Progressing  Goal: Prevent/manage excess moisture  Outcome: Progressing  Goal: Prevent/minimize sheer/friction injuries  Outcome: Progressing  Goal: Promote/optimize nutrition  Outcome: Progressing  Goal: Promote skin healing  Outcome: Progressing     Problem: Fall/Injury  Goal: Not fall by end of shift  Outcome: Progressing  Goal: Be  free from injury by end of the shift  Outcome: Progressing  Goal: Verbalize understanding of personal risk factors for fall in the hospital  Outcome: Progressing  Goal: Verbalize understanding of risk factor reduction measures to prevent injury from fall in the home  Outcome: Progressing  Goal: Pace activities to prevent fatigue by end of the shift  Outcome: Progressing     Problem: Nutrition  Goal: Oral intake greater than 50%  Outcome: Progressing  Goal: Oral intake greater 75%  Outcome: Progressing  Goal: Consume prescribed supplement  Outcome: Progressing  Goal: Adequate PO fluid intake  Outcome: Progressing  Goal: Nutrition support goals are met within 48 hrs  Outcome: Progressing  Goal: Nutrition support is meeting 75% of nutrient needs  Outcome: Progressing  Goal: Lab values WNL  Outcome: Progressing  Goal: Electrolytes WNL  Outcome: Progressing  Goal: Promote healing  Outcome: Progressing  Goal: Maintain stable weight  Outcome: Progressing  Goal: Reduce weight from edema/fluid  Outcome: Progressing  Goal: Gradual weight gain  Outcome: Progressing     Problem: Respiratory  Goal: Clear secretions with interventions this shift  Outcome: Progressing  Goal: Minimize anxiety/maximize coping throughout shift  Outcome: Progressing  Goal: Minimal/no exertional discomfort or dyspnea this shift  Outcome: Progressing  Goal: No signs of respiratory distress (eg. Use of accessory muscles. Peds grunting)  Outcome: Progressing  Goal: Patent airway maintained this shift  Outcome: Progressing  Goal: Verbalize decreased shortness of breath this shift  Outcome: Progressing  Goal: Wean oxygen to maintain O2 saturation per order/standard this shift  Outcome: Progressing     Problem: Pain  Goal: My pain/discomfort is manageable  Outcome: Progressing     Problem: Safety  Goal: Patient will be injury free during hospitalization  Outcome: Progressing  Goal: I will remain free of falls  Outcome: Progressing     Problem: Daily  Care  Goal: Daily care needs are met  Outcome: Progressing     Problem: Psychosocial Needs  Goal: Demonstrates ability to cope with hospitalization/illness  Outcome: Progressing  Goal: Collaborate with me, my family, and caregiver to identify my specific goals  Outcome: Progressing     Problem: Discharge Barriers  Goal: My discharge needs are met  Outcome: Progressing     Problem: Pain  Goal: Takes deep breaths with improved pain control throughout the shift  Outcome: Progressing  Goal: Turns in bed with improved pain control throughout the shift  Outcome: Progressing  Goal: Walks with improved pain control throughout the shift  Outcome: Progressing  Goal: Performs ADL's with improved pain control throughout shift  Outcome: Progressing  Goal: Participates in PT with improved pain control throughout the shift  Outcome: Progressing  Goal: Free from opioid side effects throughout the shift  Outcome: Progressing  Goal: Free from acute confusion related to pain meds throughout the shift  Outcome: Progressing

## 2023-12-26 NOTE — PROGRESS NOTES
Ayanna Gross is a 73 y.o. female on day 20 of admission presenting with Hypotension, unspecified hypotension type.      Subjective   Patient remains on pressors.    he has been afebrile during the night.    She denied to have shortness of breath.  .       Objective     Last Recorded Vitals  BP (!) 77/34   Pulse 86   Temp 36.7 °C (98.1 °F)   Resp 15   Wt 69.4 kg (153 lb)   SpO2 94%   Intake/Output last 3 Shifts:    Intake/Output Summary (Last 24 hours) at 12/26/2023 0848  Last data filed at 12/26/2023 0621  Gross per 24 hour   Intake 113.18 ml   Output --   Net 113.18 ml         Admission Weight  Weight: 72.6 kg (160 lb) (12/06/23 1301)    Daily Weight  12/26/23 : 69.4 kg (153 lb)    Image Results      Physical Exam  General: Pleasant, cooperating during physical exam, on pressors.  HEENT: Pupils are equal and reactive to light and commendation , oral mucosa moist, no JVD .  Cardiovascular: Normal sinus rhythm, no MRG.  Lungs: Clear to auscultation bilaterally, no wheezing, no crackles, no dullness to percussion.  Abdomen: No hepatosplenomegaly appreciated, soft , not tender, positive bowel sounds, positive bowel movement.  Neuro: Alert and oriented x2, strength in upper and lower extremities , sensation intact.  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     Assessment/Plan      Septic shock  Unstageable bilateral heel ulcer  Positive for C. difficile colitis.  History of chronic MRSA bacteremia, infective endocarditis.  Continue with doxycycline.  Patient completed full course of IV vancomycin  Continue gently with pressors  Patient remains on low-dose  of pressors.  On her baseline patient has low blood pressure.  Dr. Mason  on the case    End stage renal disease   On dialysis  Dr. Loaiza on the case.    Hypertension  Continue with midodrine and low-dose of pressors.    C. difficile colitis  ID on the case  Patient is on fidaxomicin.    Gout    Anemia of chronic disease    Pleural effusion  Status post thoracocentesis  Patient was found to have transudative pleural effusion  Pulmonary on the case    COVID-19 infection  Patient is not hypoxic.  Continue with contact isolation and droplet precaution.    Peripheral vascular disease   heel ulcers  Evaluated by podiatry  Follow-up with Jimenez Landry as outpatient     encephalopathy.  Clinically improved.    Patient is at high risk for deterioration-titrate arrhythmias.    Diabetes mellitus type II.  cover with insulin sliding scale    Continue with contact isolation droplet precaution.  Discussed in detail with at bedside.      Principal Problem:    Hypotension, unspecified hypotension type  Active Problems:    Paroxysmal atrial fibrillation (CMS/HCC)    Peripheral vascular disease (CMS/HCC)    Orthostatic hypotension    Mixed hyperlipidemia    End-stage renal disease on hemodialysis (CMS/HCC)    DM (diabetes mellitus) (CMS/HCC)    Absent pedal pulses                  Veronica Yepez MD

## 2023-12-26 NOTE — PROGRESS NOTES
Occupational Therapy    OT Treatment    Patient Name: Ayanna Gross  MRN: [id number]  Today's Date: 12/26/2023            Assessment:        Plan:  Treatment Interventions: ADL retraining, Functional transfer training, UE strengthening/ROM, Endurance training, Patient/family training, Compensatory technique education  No Skilled OT:  (Pt not medically appropriate)  OT Frequency: Other (Comment)  OT Discharge Recommendations: Other (Comment) (TBD)  OT Recommended Transfer Status: Dependent  OT - OK to Discharge: Yes  Treatment Interventions: ADL retraining, Functional transfer training, UE strengthening/ROM, Endurance training, Patient/family training, Compensatory technique education    Subjective        General:  General  Missed Visit: Yes  Missed Visit Reason: Cancel (pt with worsening medical status- not appropriate to participate in therapy interventions for multiple days. Will discharge OT orders. Reconsult OT when pt medically appropriate.)      Goals:  Encounter Problems       Encounter Problems (Active)       Balance       STG - Maintains static sitting balance with B upper extremity support on the edge of the bed x 15 minutes  (Not Progressing)       Start:  12/07/23    Expected End:  12/26/23       INTERVENTIONS:  1. Practice sitting on the edge of a bed/mat with minimal support.  2. Educate patient about pressure relief.              Transfers       STG - Patient to transfer to and from sit to supine with mod A via log rolling for skin protection. (Not Progressing)       Start:  12/07/23    Expected End:  12/26/23            STG - Patient will perform bed mobility with min A to maintain pressure relief and prevent further skin integrity issues. (Progressing)       Start:  12/07/23    Expected End:  12/26/23            STG - Patient will roll from side to side using the bed rail to assist with hygiene and pressure relief with min A. (Progressing)       Start:  12/07/23    Expected End:  12/26/23                   Encounter Problems (Resolved)       OT Goals       ADLs (Not met)       Start:  12/07/23    Expected End:  12/26/23    Resolved:  12/26/23    Patient will complete ADL tasks with Mod I, using AE as needed, in order to increase patient's safety and independence with self-care tasks.         Functional Transfers (Not met)       Start:  12/07/23    Expected End:  12/26/23    Resolved:  12/26/23    Patient will complete functional transfers with Close Supervision in order to increase safety and independence with daily tasks.         B UE Strengthening (Not met)       Start:  12/07/23    Expected End:  12/26/23    Resolved:  12/26/23    Patient will increase B UE strength to 4/5 for functional transfers.         Standing Tolerance (Not met)       Start:  12/07/23    Expected End:  12/26/23    Resolved:  12/26/23    Patient will demonstrate the ability to stand at least >/= 1 minutes with Fair+ balance in order to increase patient's safety and independence with functional transfers.

## 2023-12-26 NOTE — CARE PLAN
Patient not medically appropriate for multiple days- discharge OT orders.      Problem: OT Goals  Goal: ADLs  Description: Patient will complete ADL tasks with Mod I, using AE as needed, in order to increase patient's safety and independence with self-care tasks.  Outcome: Not met  Goal: Functional Transfers  Description: Patient will complete functional transfers with Close Supervision in order to increase safety and independence with daily tasks.  Outcome: Not met  Goal: B UE Strengthening  Description: Patient will increase B UE strength to 4/5 for functional transfers.  Outcome: Not met  Goal: Standing Tolerance  Description: Patient will demonstrate the ability to stand at least >/= 1 minutes with Fair+ balance in order to increase patient's safety and independence with functional transfers.  Outcome: Not met

## 2023-12-26 NOTE — PROGRESS NOTES
Critical Care Progress Note    Ayanna Gross is a 73 y.o. female on day 20 of admission presenting with Hypotension, unspecified hypotension type.    Subjective   Awake and alert.  Answering questions appropriately.  Remains on Levophed.    Objective   Vital Signs      12/26/2023     5:15 AM 12/26/2023     5:30 AM 12/26/2023     5:45 AM 12/26/2023     6:00 AM 12/26/2023     6:15 AM 12/26/2023     6:30 AM 12/26/2023     6:45 AM   Vitals   Heart Rate 95 95 94 90 89 86 86   Resp 10 15 11 16 15 13 15       Oxygen Therapy  SpO2: 94 %  Medical Gas Therapy: Supplemental oxygen  O2 Delivery Method: Nasal cannula         Intake/Output previous 24 hours:    Intake/Output Summary (Last 24 hours) at 12/26/2023 0910  Last data filed at 12/26/2023 0621  Gross per 24 hour   Intake 113.18 ml   Output --   Net 113.18 ml       Physical Exam  Constitutional:       Appearance: Normal appearance.   HENT:      Head: Normocephalic and atraumatic.   Eyes:      Extraocular Movements: Extraocular movements intact.      Pupils: Pupils are equal, round, and reactive to light.   Cardiovascular:      Rate and Rhythm: Normal rate and regular rhythm.      Pulses: Normal pulses.      Heart sounds: Normal heart sounds.   Pulmonary:      Effort: Pulmonary effort is normal.      Breath sounds: Normal breath sounds.   Abdominal:      General: Bowel sounds are normal.      Palpations: Abdomen is soft.   Musculoskeletal:      Cervical back: Normal range of motion and neck supple.   Skin:     General: Skin is warm and dry.   Neurological:      General: No focal deficit present.      Mental Status: She is alert and oriented to person, place, and time.         Lines and Tubes:  CVC Double lumen Tunneled Right Subclavian (Active)   Earliest Known Present: 12/06/23   Lumen Type: Double lumen  CVC Type: Tunneled  Description (optional): Permacath  Orientation: Right  Location: Subclavian   Number of days: 20       CVC 12/20/23 Triple lumen Non-tunneled Left  Femoral (Active)   Placement Date/Time: 12/20/23 2233   Hand Hygiene Performed Prior to CVC Insertion: Yes  Site Prep: Chlorhexidine   Site Prep Agent has Completely Dried Before Insertion: Yes  All 5 Sterile Barriers Used (Gloves, Gown, Cap, Mask, Large Sterile Drape):...   Number of days: 5       Peripheral IV 12/21/23 22 G Right Forearm (Active)   Placement Date/Time: 12/21/23 0130   Size (Gauge): 22 G  Orientation: Right  Location: Forearm  Site Prep: Alcohol   Number of days: 5       Arterial Line 12/16/23 Right Femoral (Active)   Placement Date/Time: 12/16/23 (c) 1755   Size: 18 G  Orientation: Right  Location: Femoral  Site Prep: Chlorhexidine   Local Anesthetic: Injectable  Insertion attempts: 1  Securement Method: Transparent dressing   Number of days: 9       Rectal Tube With balloon (Active)   Placement Date/Time: 12/19/23 1300   Type: (c) With balloon   Number of days: 6         Scheduled medications  apixaban, 2.5 mg, oral, BID  cholestyramine, 4 g, oral, Daily with evening meal  collagenase, , Topical, Daily  doxycycline, 100 mg, oral, Daily  epoetin di or biosimilar, 10,000 Units, intravenous, Every Mon/Wed/Fri  escitalopram, 10 mg, oral, Daily  febuxostat, 40 mg, oral, Every other day  fenofibrate, 160 mg, oral, Daily  fidaxomicin, 200 mg, oral, BID  gabapentin, 100 mg, oral, BID  heparin, 2,000 Units, intra-catheter, After Dialysis  heparin, 2,000 Units, intra-catheter, After Dialysis  heparin, 2,000 Units, intra-catheter, After Dialysis  heparin, 2,000 Units, intra-catheter, After Dialysis  heparin, 2,000 Units, intra-catheter, After Dialysis  insulin lispro, 0-5 Units, subcutaneous, TID with meals  ipratropium-albuteroL, 3 mL, nebulization, TID  [Held by provider] metoprolol succinate XL, 12.5 mg, oral, Daily  midodrine, 15 mg, oral, TID with meals  nystatin, 1 Application, Topical, BID  simvastatin, 40 mg, oral, Nightly      Continuous medications  norepinephrine, 0.01-3 mcg/kg/min, Last Rate:  0.06 mcg/kg/min (12/26/23 0745)      PRN medications  PRN medications: acetaminophen, acetaminophen, dextrose 10 % in water (D10W), dextrose, glucagon, HYDROcodone-acetaminophen, oxygen, polyethylene glycol, zinc oxide    Relevant Results  Results from last 7 days   Lab Units 12/26/23  0439 12/25/23 0409 12/24/23 0229   WBC AUTO x10*3/uL 13.4* 14.0* 12.7*   HEMOGLOBIN g/dL 9.3* 9.6* 9.5*   HEMATOCRIT % 27.0* 29.5* 26.5*   PLATELETS AUTO x10*3/uL 221 201 218      Results from last 7 days   Lab Units 12/26/23 0439 12/25/23 0409 12/24/23 0229   SODIUM mmol/L 132* 135 136   POTASSIUM mmol/L 3.7 3.7 4.0   CHLORIDE mmol/L 98 98 98   CO2 mmol/L 24 27 29   BUN mg/dL 15 8 16   CREATININE mg/dL 3.10* 2.00* 3.20*   GLUCOSE mg/dL 138* 162* 139*   CALCIUM mg/dL 8.1* 8.2* 8.8          XR chest 1 view 12/19/2023    Narrative  Interpreted By:  Judd Lee,  STUDY:  XR CHEST 1 VIEW; 12/19/2023 5:44 am    INDICATION:  Signs/Symptoms:pleural effusion.    COMPARISON:  December 14, 2023    ACCESSION NUMBER(S):  [id number]    ORDERING CLINICIAN:  PARIS LUCAS    FINDINGS:  RESULT: Right sided central venous catheter is again noted extending  into the right atrium. The distal tip is not well visualized.  Left-sided vascular stents are noted in the left subclavian region.  Left axillary surgical clips are noted. The cardiac silhouette is  within normal limits for size. Mediastinal contours are unremarkable.  Left basilar opacity obscures the left hemidiaphragm with hazy  opacity throughout the left lung. There are degenerative changes of  the thoracic spine and shoulders.    Impression  Persistent left basilar opacity obscures the left hemidiaphragm with  hazy opacity extending through the left hemithorax. Findings likely  represent pleural effusion atelectasis. Underlying infiltrate or mass  is not excluded. Overall findings not significantly changed compared  to previous exam      Signed by: Judd Lee 12/19/2023 9:02  AM  Dictation workstation:   [id number]      Patient Active Problem List   Diagnosis    Amputation of finger, except thumb    Tenosynovitis    Inflammatory dermatosis    Skin abnormality    Dry skin    Arthritis    Syncope    Swelling of finger    Sinusitis, acute    Bronchitis, acute    Second degree burn of single finger of left hand, not thumb    Rash    Postmenopausal bleeding    Pneumonia    Infection    Phlegmon    Paroxysmal atrial fibrillation (CMS/HCC)    Right arm pain    Pain in right shoulder    Skin changes due to chronic exposure to nonionizing radiation, unspecified    Other seborrheic keratosis    Inflamed seborrheic keratosis    Actinic keratosis    Open wound of finger    Obstructive sleep apnea    Obesity with body mass index 30 or greater    Nuclear senile cataract    Nonhealing surgical wound    Neoplasm of uncertain behavior of skin    Miosis    Mild nonproliferative diabetic retinopathy (CMS/HCC)    Lactic acidosis    Injury of head    Infection due to Escherichia coli    Hyponatremia    Peripheral vascular disease (CMS/HCC)    Orthostatic hypotension    Low blood pressure    Hypertension    History of breast cancer in female    Personal history of other malignant neoplasm of skin    High cholesterol    Mixed hyperlipidemia    Heart failure (CMS/HCC)    Headache    Growing pains    Gout    Chronic gout with tophus    Gastrointestinal hemorrhage    Fecal incontinence    Fatigue    Fall    Facial basal cell cancer    End-stage renal disease on hemodialysis (CMS/HCC)    Anemia of chronic renal failure    End stage renal disease (CMS/HCC)    Difficulty walking    Dyslipidemia    Diabetic foot ulcer (CMS/HCC)    Dermatochalasis of right upper eyelid    Dermatochalasis of left upper eyelid    Mixed anxiety and depressive disorder    Depression    Dependence on renal dialysis (CMS/HCC)    Contusion of face    Clouded consciousness    Chronic pain of both knees    Chronic osteomyelitis (CMS/HCC)     Chronic kidney disease, stage 3 (CMS/[facility])    Chronic kidney disease (CKD), stage IV (severe) (CMS/[facility])    Chorioretinal scar    Cerebrovascular accident (CMS/HCC)    Breast cancer (CMS/[facility])    Arthritis of knee, right    Arthritis of knee, left    Anemia of renal disease    Altered mental status    Colon cancer screening    Cellulitis of finger    Loss of consciousness (CMS/[facility])    Tenosynovitis of fingers    Thyroid nodule    Tinea corporis    Toxic metabolic encephalopathy    DM (diabetes mellitus) (CMS/[facility])    Hypoglycemia    Type 2 diabetes mellitus (CMS/[facility])    Visual hallucinations    Vocal cord paralysis    Wound infection    Hypotension, unspecified hypotension type    Absent pedal pulses     Assessment/Plan   Septic shock  COVID-19 infection  C. difficile colitis  End-stage renal disease  Possible endocarditis  Transudative pleural effusion      Remains on pressors.  Dialysis per renal.  Oral doxycycline for history of MRSA.  Continue Dificid  Wound care  Goal systolic of 80-90 Seems appropriate for this patient.  Continue to attempt to wean off Levophed.  On 3 L oxygen.  Repeat chest x-ray    Matthew Engle MD  [facility]       This critically ill patient continues to be at-risk for deterioration / failure due to the above mentioned dysfunctional unstable organ systems.   Critical care time is spent at bedside includes review of diagnostic tests, labs, and radiographs, serial assessments and management of hemodynamics, respiratory status, and coordination of care with different members of interdisciplinary team  Assessment, impression and plans are reflected in the note above as well as the orders.     Critical concerns addressed:     Time Spent in critical Care, exclusive of procedures : mins.     Disclaimer: Parts of this chart were dictated with voice recognition software. Please excuse any errors of grammar, spelling, or transcription which are not corrected. Please contact me with any  questions regarding documentation.

## 2023-12-27 NOTE — PROGRESS NOTES
"Nutrition Follow up Note    Nutrition Assessment      Patient in Covid19 isolation. Spoke with RN. PO intake continues to be low, even with assistance/encouragement.    Nutrition History:     Energy Intake: Fair 50-75 %  Food Allergies/Intolerances:  None  GI Symptoms: None  Oral Problems: None    Anthropometrics:  Ht: 157.5 cm (5' 2.01\"), Wt: 69.3 kg (152 lb 13.9 oz), BMI: 27.95  IBW/kg (Dietitian Calculated): 50 kg  Percent of IBW: 145 %  Adjusted Body Weight (kg): 55.7 kg    Weight Change:  Weight History / % Weight Change: Pt unsure of any weight changes, but reports her #. Possible 7# weight loss in 2 weeks.  Significant Weight Loss: Yes     Nutrition Focused Physical Exam Findings:   Subcutaneous Fat Loss  Buccal Fat Pads: Well nourished (full, rounded cheeks)  Triceps: Mild-moderate (less than ample fat tissue)    Muscle Wasting  Temporalis: Well nourished (well-defined muscle)  Pectoralis (Clavicular Region): Well nourished (clavicle not visible)  Deltoid/Trapezius: Mild-Moderate (slight protrusion of acromion process)    Physical Findings (Nutrition Deficiency/Toxicity)  Skin:  (bilateral diabetic ulcer non-healing wounds, groin bilateral wound, and buttocks bilateral wound (12/6))    Nutrition Significant Labs:  Lab Results   Component Value Date    WBC 14.1 (H) 12/27/2023    HGB 10.0 (L) 12/27/2023    HCT 30.0 (L) 12/27/2023     12/27/2023    CHOL 104 (L) 07/01/2023    TRIG 155 (H) 07/01/2023    HDL 30 (L) 07/01/2023    ALT 7 12/26/2023    AST 14 12/26/2023     12/27/2023    K 4.0 12/27/2023    CL 99 12/27/2023    CREATININE 2.10 (H) 12/27/2023    BUN 10 12/27/2023    CO2 26 12/27/2023    TSH 1.56 02/04/2020    INR 1.1 08/20/2023    HGBA1C 8.8 (H) 06/25/2023    ALBUR 1,315 (H) 02/24/2019       Current Facility-Administered Medications:     acetaminophen (Tylenol) tablet 650 mg, 650 mg, oral, q4h PRN, Inez Josue, APRN-CNP    acetaminophen (Tylenol) tablet 650 mg, 650 mg, oral, q4h " PRN, LILLIE Carrasquillo, 650 mg at 12/16/23 2021    apixaban (Eliquis) tablet 2.5 mg, 2.5 mg, oral, BID, LILLIE Carrasquillo, 2.5 mg at 12/27/23 0802    cholestyramine (Questran) 4 gram packet 4 g, 4 g, oral, Daily with evening meal, Stephen Coulter MD, 4 g at 12/26/23 1700    collagenase 250 unit/gram ointment, , Topical, Daily, LILLIE Carrasquillo, Given at 12/27/23 1008    dextrose 10 % in water (D10W) infusion, 0.3 g/kg/hr, intravenous, Once PRN, Dhiraj De La Garza MD    dextrose 50 % injection 25 g, 25 g, intravenous, q15 min PRN, Dhiraj De La Garza MD    doxycycline (Vibramycin) capsule 100 mg, 100 mg, oral, Daily, LILLIE Carrasquillo, 100 mg at 12/27/23 0801    epoetin di-epbx (Retacrit) injection 10,000 Units, 10,000 Units, intravenous, Every Mon/Wed/Fri, LILLIE Carrasquillo, 10,000 Units at 12/27/23 1028    escitalopram (Lexapro) tablet 10 mg, 10 mg, oral, Daily, LILLIE Carrasquillo, 10 mg at 12/27/23 0801    febuxostat (Uloric) tablet 40 mg, 40 mg, oral, Every other day, LILLIE Carrasquillo, 40 mg at 12/27/23 0801    fenofibrate (Triglide) tablet 160 mg, 160 mg, oral, Daily, LILLIE Carrasquillo, 160 mg at 12/27/23 0800    fidaxomicin (Dificid) tablet 200 mg, 200 mg, oral, BID, Stephen Coulter MD, 200 mg at 12/27/23 0801    gabapentin (Neurontin) capsule 100 mg, 100 mg, oral, BID, LILLIE Carrasquillo, 100 mg at 12/27/23 0801    glucagon (Glucagen) injection 1 mg, 1 mg, intramuscular, q15 min PRN, Dhiraj De La Garza MD    heparin 1,000 unit/mL injection 2,000 Units, 2,000 Units, intra-catheter, After Dialysis, Saeed Mondragon MD, 1,600 Units at 12/20/23 1320    heparin 1,000 unit/mL injection 2,000 Units, 2,000 Units, intra-catheter, After Dialysis, Saeed Mondragon MD, 1,600 Units at 12/20/23 1320    heparin 1,000 unit/mL injection 2,000 Units, 2,000 Units, intra-catheter, After Dialysis, Saeed Mondragon MD    heparin 1,000  unit/mL injection 2,000 Units, 2,000 Units, intra-catheter, After Dialysis, Saeed Mondragon MD, 3,200 Units at 12/22/23 1155    HYDROcodone-acetaminophen (Norco) 5-325 mg per tablet 1 tablet, 1 tablet, oral, q6h PRN, Daniel Rubio DO, 1 tablet at 12/27/23 0103    insulin lispro (HumaLOG) injection 0-5 Units, 0-5 Units, subcutaneous, TID with meals, LILLIE Carrasquillo, 1 Units at 12/27/23 0827    ipratropium-albuteroL (Duo-Neb) 0.5-2.5 mg/3 mL nebulizer solution 3 mL, 3 mL, nebulization, TID, LILLIE Carrasquillo, 3 mL at 12/27/23 0700    [Held by provider] metoprolol succinate XL (Toprol-XL) 24 hr tablet 12.5 mg, 12.5 mg, oral, Daily, LILLIE Carrasquillo, 12.5 mg at 12/13/23 1327    midodrine (Proamatine) tablet 15 mg, 15 mg, oral, TID with meals, LILLIE Carrasquillo, 15 mg at 12/27/23 1147    norepinephrine (Levophed) 8 mg in dextrose 5% 250 mL (0.032 mg/mL) infusion (premix), 0.01-3 mcg/kg/min, intravenous, Continuous, Saeed Mondragon MD, Last Rate: 10.64 mL/hr at 12/27/23 1200, 0.08 mcg/kg/min at 12/27/23 1200    nystatin (Mycostatin) 100,000 unit/gram powder 1 Application, 1 Application, Topical, BID, LILLIE Carrasquillo, 1 Application at 12/27/23 0801    oxygen (O2) therapy, , inhalation, Continuous PRN - O2/gases, Klaudia Figueroa DO, 3 L/min at 12/26/23 0800    polyethylene glycol (Glycolax, Miralax) packet 17 g, 17 g, oral, Daily PRN, LILLIE Carrasquillo    simvastatin (Zocor) tablet 40 mg, 40 mg, oral, Nightly, LILLIE Carrasquillo, 40 mg at 12/26/23 2027    zinc oxide 20 % ointment 1 Application, 1 Application, Topical, q1h PRN, LILLIE Carrasquillo    Dietary Orders (From admission, onward)       Start     Ordered    12/22/23 1224  Oral nutritional supplements  Until discontinued        Comments: Vanilla   Question Answer Comment   Deliver with All meals    Select supplement: Sugar Free Mighty Shake        12/22/23 1223    12/22/23  1224  Oral nutritional supplements  Until discontinued        Comments: Fruit punch mixed with water   Question Answer Comment   Deliver with Breakfast    Deliver with Dinner    Select supplement: Christian        12/22/23 1224    12/21/23 1330  Adult diet Regular  Diet effective now        Question:  Diet type  Answer:  Regular    12/21/23 1329                   Estimated Needs:   Estimated Energy Needs  Total Energy Estimated Needs (kCal):  (8509-0799 kcals)  Total Estimated Energy Need per Day (kCal/kg):  (30-35 kcals/kg)  Method for Estimating Needs: adjusted wt    Estimated Protein Needs  Total Protein Estimated Needs (g):  (67-84)  Total Protein Estimated Needs (g/kg):  (1.2-1.5)  Method for Estimating Needs: adjusted wt    Estimated Fluid Needs  Total Fluid Estimated Needs (mL):  (7916-8434 mL)  Method for Estimating Needs: UO + 500-1000 ml      Nutrition Diagnosis   Nutrition Diagnosis:  Malnutrition Diagnosis  Patient has Malnutrition Diagnosis: Yes  Diagnosis Status: Ongoing  Malnutrition Diagnosis: Moderate malnutrition related to acute disease or injury  As Evidenced by: 1-2% unintentional wt loss in 1 week (~4% in 2 weeks), Poor intake <75% of estimated energy requirement > 7days, and mild fat and muscle deficits    Nutrition Diagnosis  Patient has Nutrition Diagnosis: Yes  Diagnosis Status (1): Ongoing  Nutrition Diagnosis 1: Increased nutrient needs  Related to (1): increased demand for nutrient  As Evidenced by (1): diabetic ulcers and wounds     Nutrition Interventions/Recommendations   Nutrition Interventions and Recommendations:    Nutrition Prescription:  Individualized Nutrition Prescription Provided for : 2872-5966 kcal and 67-84g protein provided via renal diet and supplements    Nutrition Interventions:   Food and/or Nutrient Delivery Interventions  Interventions: Meals and snacks, Medical food supplement  Meals and Snacks: General healthful diet  Goal: Provide as ordered  Medical Food  Supplement: Commercial beverage, Modified beverage  Goal: mighty shake TID to provide 200 kcals and 7 g protein per carton, Christian BID to provide 90 calories, 2.5 gm protein each    Education Documentation  No documentation found.         Nutrition Monitoring and Evaluation   Monitoring/Evaluation:   Food/Nutrient Related History Monitoring  Monitoring and Evaluation Plan: Energy intake  Energy Intake: Estimated energy intake  Criteria: Pt will tolerate >75% of estimated energy needs  Fluid Intake: Estimated fluid intake  Criteria: Pt will tolerate >75% of supplements    Body Composition/Growth/Weight History  Monitoring and Evaluation Plan: Weight  Weight: Measured weight  Criteria: Pt will maintain wt    Nutrition Focused Physical Findings  Monitoring and Evaluation Plan: Skin  Skin: Impaired wound healing  Criteria: Pt will maintain/show signs of improvement in skin integrity       Time Spent/Follow-up:   Follow Up  Time Spent (min): 30 minutes  Last Date of Nutrition Visit: 12/27/23  Nutrition Follow-Up Needed?: 5-7 days  Follow up Comment: 1/3/24

## 2023-12-27 NOTE — PROGRESS NOTES
CONSULT PROGRESS NOTES    SERVICE DATE: 12/27/2023   SERVICE TIME: 8:59 AM    CONSULTING SERVICE: Nephrology    ASSESSMENT AND PLAN   73-year-old with numerous medical problems including end-stage renal disease admitted for hypotension, C. difficile colitis, recurrent pleural effusion, and COVID 19.  1.  End-stage renal disease  2.  Hypotension  3.  Fluid overload  4.  Anemia of chronic renal disease     I am obliging a thrice weekly hemodialysis schedule.  She remains on a low-dose of norepinephrine and maximum dose midodrine.  Continue to wean the pressors for a systolic blood pressure above 70, as she is not uncommonly in the 70 systolic on outpatient dialysis.  There is no role for hemodialysis today.  Anticipate hemodialysis will be needed on Friday given that she is off schedule this week and had treatment yesterday.  I am not inclined to use CRRT.  She seems to be mentating about her baseline.     Continue midodrine, continue low temperature dialysat during treatments.  Continue supportive care.    SUBJECTIVE  INTERVAL HPI: She had hemodialysis yesterday with 1 L ultrafiltration.  Pressors were slightly increased during treatment.  She has continued diarrhea and has a rectal tube in place.  The blood pressure remains low, but she is currently in the 90s systolic.  There are no fevers.  She has no dyspnea.  She has some pain in her legs.    MEDICATIONS:  apixaban, 2.5 mg, oral, BID  cholestyramine, 4 g, oral, Daily with evening meal  collagenase, , Topical, Daily  doxycycline, 100 mg, oral, Daily  epoetin di or biosimilar, 10,000 Units, intravenous, Every Mon/Wed/Fri  escitalopram, 10 mg, oral, Daily  febuxostat, 40 mg, oral, Every other day  fenofibrate, 160 mg, oral, Daily  fidaxomicin, 200 mg, oral, BID  gabapentin, 100 mg, oral, BID  heparin, 2,000 Units, intra-catheter, After Dialysis  heparin, 2,000 Units, intra-catheter, After Dialysis  heparin, 2,000 Units, intra-catheter, After Dialysis  heparin,  2,000 Units, intra-catheter, After Dialysis  heparin, 2,000 Units, intra-catheter, After Dialysis  insulin lispro, 0-5 Units, subcutaneous, TID with meals  ipratropium-albuteroL, 3 mL, nebulization, TID  [Held by provider] metoprolol succinate XL, 12.5 mg, oral, Daily  midodrine, 15 mg, oral, TID with meals  nystatin, 1 Application, Topical, BID  simvastatin, 40 mg, oral, Nightly       norepinephrine, 0.01-3 mcg/kg/min, Last Rate: 0.1 mcg/kg/min (12/27/23 0600)       PRN medications: acetaminophen, acetaminophen, dextrose 10 % in water (D10W), dextrose, glucagon, HYDROcodone-acetaminophen, oxygen, polyethylene glycol, zinc oxide     OBJECTIVE  PHYSICAL EXAM:   Heart Rate:  []   Temp:  [35.9 °C (96.6 °F)-37.4 °C (99.3 °F)]   Resp:  [7-26]   BP: (96)/(43)   SpO2:  [85 %-100 %]   Body mass index is 27.98 kg/m².  This is a chronically ill-appearing mildly toxic obese white woman  Very pale skin  Hearing intact  Phonation intact  dry oral mucosa  Regular heart rate  Unlabored breathing  Abdomen is soft, nondistended, nontender, positive bowel sounds  No Amos catheter in place, no suprapubic tenderness to palpation  There is no significant lower extremity edema, left index finger amputation  Moves 4 limbs spontaneously  No obvious joint deformities  No lymphadenopathy  Right internal jugular tunneled hemodialysis catheter  She has failed fistula in her left upper extremity  Rectal tube in place    DATA:   Labs:  Results for orders placed or performed during the hospital encounter of 12/06/23 (from the past 96 hour(s))   Renal function panel   Result Value Ref Range    Glucose 119 (H) 65 - 99 mg/dL    Sodium 135 133 - 145 mmol/L    Potassium 3.6 3.4 - 5.1 mmol/L    Chloride 97 97 - 107 mmol/L    Bicarbonate 28 24 - 31 mmol/L    Urea Nitrogen 11 8 - 25 mg/dL    Creatinine 2.50 (H) 0.40 - 1.60 mg/dL    eGFR 20 (L) >60 mL/min/1.73m*2    Calcium 8.1 (L) 8.5 - 10.4 mg/dL    Phosphorus 1.0 (L) 2.5 - 4.5 mg/dL    Albumin  2.6 (L) 3.5 - 5.0 g/dL    Anion Gap 10 <=19 mmol/L   CBC and Auto Differential   Result Value Ref Range    WBC 12.6 (H) 4.4 - 11.3 x10*3/uL    nRBC 0.4 (H) 0.0 - 0.0 /100 WBCs    RBC 2.93 (L) 4.00 - 5.20 x10*6/uL    Hemoglobin 8.9 (L) 12.0 - 16.0 g/dL    Hematocrit 26.0 (L) 36.0 - 46.0 %    MCV 89 80 - 100 fL    MCH 30.4 26.0 - 34.0 pg    MCHC 34.2 32.0 - 36.0 g/dL    RDW 23.8 (H) 11.5 - 14.5 %    Platelets 175 150 - 450 x10*3/uL    Neutrophils % 62.5 40.0 - 80.0 %    Immature Granulocytes %, Automated 1.1 (H) 0.0 - 0.9 %    Lymphocytes % 21.9 13.0 - 44.0 %    Monocytes % 8.6 2.0 - 10.0 %    Eosinophils % 5.0 0.0 - 6.0 %    Basophils % 0.9 0.0 - 2.0 %    Neutrophils Absolute 7.86 (H) 1.60 - 5.50 x10*3/uL    Immature Granulocytes Absolute, Automated 0.14 0.00 - 0.50 x10*3/uL    Lymphocytes Absolute 2.75 0.80 - 3.00 x10*3/uL    Monocytes Absolute 1.08 (H) 0.05 - 0.80 x10*3/uL    Eosinophils Absolute 0.63 (H) 0.00 - 0.40 x10*3/uL    Basophils Absolute 0.11 (H) 0.00 - 0.10 x10*3/uL   Morphology   Result Value Ref Range    RBC Morphology See Below     Polychromasia Mild     Target Cells Few     Basophilic Stippling Present     Pappenheimer Bodies Present    POCT GLUCOSE   Result Value Ref Range    POCT Glucose 106 (H) 74 - 99 mg/dL   POCT GLUCOSE   Result Value Ref Range    POCT Glucose 173 (H) 74 - 99 mg/dL   POCT GLUCOSE   Result Value Ref Range    POCT Glucose 133 (H) 74 - 99 mg/dL   Renal Function Panel   Result Value Ref Range    Glucose 139 (H) 65 - 99 mg/dL    Sodium 136 133 - 145 mmol/L    Potassium 4.0 3.4 - 5.1 mmol/L    Chloride 98 97 - 107 mmol/L    Bicarbonate 29 24 - 31 mmol/L    Urea Nitrogen 16 8 - 25 mg/dL    Creatinine 3.20 (H) 0.40 - 1.60 mg/dL    eGFR 15 (L) >60 mL/min/1.73m*2    Calcium 8.8 8.5 - 10.4 mg/dL    Phosphorus 1.9 (L) 2.5 - 4.5 mg/dL    Albumin 2.6 (L) 3.5 - 5.0 g/dL    Anion Gap 9 <=19 mmol/L   CBC and Auto Differential   Result Value Ref Range    WBC 12.7 (H) 4.4 - 11.3 x10*3/uL    nRBC  0.4 (H) 0.0 - 0.0 /100 WBCs    RBC 2.92 (L) 4.00 - 5.20 x10*6/uL    Hemoglobin 9.5 (L) 12.0 - 16.0 g/dL    Hematocrit 26.5 (L) 36.0 - 46.0 %    MCV 91 80 - 100 fL    MCH 32.5 26.0 - 34.0 pg    MCHC 35.8 32.0 - 36.0 g/dL    RDW 25.5 (H) 11.5 - 14.5 %    Platelets 218 150 - 450 x10*3/uL    Neutrophils % 63.2 40.0 - 80.0 %    Immature Granulocytes %, Automated 0.8 0.0 - 0.9 %    Lymphocytes % 21.6 13.0 - 44.0 %    Monocytes % 8.0 2.0 - 10.0 %    Eosinophils % 5.4 0.0 - 6.0 %    Basophils % 1.0 0.0 - 2.0 %    Neutrophils Absolute 8.02 (H) 1.60 - 5.50 x10*3/uL    Immature Granulocytes Absolute, Automated 0.10 0.00 - 0.50 x10*3/uL    Lymphocytes Absolute 2.75 0.80 - 3.00 x10*3/uL    Monocytes Absolute 1.02 (H) 0.05 - 0.80 x10*3/uL    Eosinophils Absolute 0.69 (H) 0.00 - 0.40 x10*3/uL    Basophils Absolute 0.13 (H) 0.00 - 0.10 x10*3/uL   Morphology   Result Value Ref Range    RBC Morphology See Below     Polychromasia Mild     Target Cells Few    POCT GLUCOSE   Result Value Ref Range    POCT Glucose 135 (H) 74 - 99 mg/dL   POCT GLUCOSE   Result Value Ref Range    POCT Glucose 163 (H) 74 - 99 mg/dL   POCT GLUCOSE   Result Value Ref Range    POCT Glucose 154 (H) 74 - 99 mg/dL   POCT GLUCOSE   Result Value Ref Range    POCT Glucose 175 (H) 74 - 99 mg/dL   Renal Function Panel   Result Value Ref Range    Glucose 162 (H) 65 - 99 mg/dL    Sodium 135 133 - 145 mmol/L    Potassium 3.7 3.4 - 5.1 mmol/L    Chloride 98 97 - 107 mmol/L    Bicarbonate 27 24 - 31 mmol/L    Urea Nitrogen 8 8 - 25 mg/dL    Creatinine 2.00 (H) 0.40 - 1.60 mg/dL    eGFR 26 (L) >60 mL/min/1.73m*2    Calcium 8.2 (L) 8.5 - 10.4 mg/dL    Phosphorus 1.7 (L) 2.5 - 4.5 mg/dL    Albumin 2.8 (L) 3.5 - 5.0 g/dL    Anion Gap 10 <=19 mmol/L   CBC and Auto Differential   Result Value Ref Range    WBC 14.0 (H) 4.4 - 11.3 x10*3/uL    nRBC 0.1 (H) 0.0 - 0.0 /100 WBCs    RBC 3.37 (L) 4.00 - 5.20 x10*6/uL    Hemoglobin 9.6 (L) 12.0 - 16.0 g/dL    Hematocrit 29.5 (L) 36.0 -  46.0 %    MCV 88 80 - 100 fL    MCH 28.5 26.0 - 34.0 pg    MCHC 32.5 32.0 - 36.0 g/dL    RDW 23.0 (H) 11.5 - 14.5 %    Platelets 201 150 - 450 x10*3/uL    Neutrophils % 68.7 40.0 - 80.0 %    Immature Granulocytes %, Automated 0.9 0.0 - 0.9 %    Lymphocytes % 16.6 13.0 - 44.0 %    Monocytes % 8.7 2.0 - 10.0 %    Eosinophils % 4.1 0.0 - 6.0 %    Basophils % 1.0 0.0 - 2.0 %    Neutrophils Absolute 9.63 (H) 1.60 - 5.50 x10*3/uL    Immature Granulocytes Absolute, Automated 0.12 0.00 - 0.50 x10*3/uL    Lymphocytes Absolute 2.33 0.80 - 3.00 x10*3/uL    Monocytes Absolute 1.22 (H) 0.05 - 0.80 x10*3/uL    Eosinophils Absolute 0.57 (H) 0.00 - 0.40 x10*3/uL    Basophils Absolute 0.14 (H) 0.00 - 0.10 x10*3/uL   Procalcitonin   Result Value Ref Range    Procalcitonin 1.33 (H) <=0.07 ng/mL   C-Reactive Protein   Result Value Ref Range    C-Reactive Protein 5.20 (H) 0.00 - 2.00 mg/dL   Magnesium   Result Value Ref Range    Magnesium 1.60 1.60 - 3.10 mg/dL   Morphology   Result Value Ref Range    RBC Morphology See Below     Polychromasia Mild     Target Cells Few    POCT GLUCOSE   Result Value Ref Range    POCT Glucose 134 (H) 74 - 99 mg/dL   POCT GLUCOSE   Result Value Ref Range    POCT Glucose 131 (H) 74 - 99 mg/dL   POCT GLUCOSE   Result Value Ref Range    POCT Glucose 174 (H) 74 - 99 mg/dL   POCT GLUCOSE   Result Value Ref Range    POCT Glucose 150 (H) 74 - 99 mg/dL   CBC and Auto Differential   Result Value Ref Range    WBC 13.4 (H) 4.4 - 11.3 x10*3/uL    nRBC 0.0 0.0 - 0.0 /100 WBCs    RBC 3.23 (L) 4.00 - 5.20 x10*6/uL    Hemoglobin 9.3 (L) 12.0 - 16.0 g/dL    Hematocrit 27.0 (L) 36.0 - 46.0 %    MCV 84 80 - 100 fL    MCH 28.8 26.0 - 34.0 pg    MCHC 34.4 32.0 - 36.0 g/dL    RDW 23.2 (H) 11.5 - 14.5 %    Platelets 221 150 - 450 x10*3/uL    Neutrophils % 65.9 40.0 - 80.0 %    Immature Granulocytes %, Automated 0.7 0.0 - 0.9 %    Lymphocytes % 18.7 13.0 - 44.0 %    Monocytes % 8.9 2.0 - 10.0 %    Eosinophils % 4.8 0.0 - 6.0 %     Basophils % 1.0 0.0 - 2.0 %    Neutrophils Absolute 8.87 (H) 1.60 - 5.50 x10*3/uL    Immature Granulocytes Absolute, Automated 0.09 0.00 - 0.50 x10*3/uL    Lymphocytes Absolute 2.51 0.80 - 3.00 x10*3/uL    Monocytes Absolute 1.19 (H) 0.05 - 0.80 x10*3/uL    Eosinophils Absolute 0.65 (H) 0.00 - 0.40 x10*3/uL    Basophils Absolute 0.13 (H) 0.00 - 0.10 x10*3/uL   Comprehensive metabolic panel   Result Value Ref Range    Glucose 138 (H) 65 - 99 mg/dL    Sodium 132 (L) 133 - 145 mmol/L    Potassium 3.7 3.4 - 5.1 mmol/L    Chloride 98 97 - 107 mmol/L    Bicarbonate 24 24 - 31 mmol/L    Urea Nitrogen 15 8 - 25 mg/dL    Creatinine 3.10 (H) 0.40 - 1.60 mg/dL    eGFR 16 (L) >60 mL/min/1.73m*2    Calcium 8.1 (L) 8.5 - 10.4 mg/dL    Albumin 2.5 (L) 3.5 - 5.0 g/dL    Alkaline Phosphatase 108 35 - 125 U/L    Total Protein 5.5 (L) 5.9 - 7.9 g/dL    AST 14 5 - 40 U/L    Bilirubin, Total 1.1 0.1 - 1.2 mg/dL    ALT 7 5 - 40 U/L    Anion Gap 10 <=19 mmol/L   Phosphorus   Result Value Ref Range    Phosphorus 3.0 2.5 - 4.5 mg/dL   Morphology   Result Value Ref Range    RBC Morphology See Below     Polychromasia Mild     Target Cells Many     Basophilic Stippling Present     Clumped Platelets Present    POCT GLUCOSE   Result Value Ref Range    POCT Glucose 136 (H) 74 - 99 mg/dL   POCT GLUCOSE   Result Value Ref Range    POCT Glucose 191 (H) 74 - 99 mg/dL   POCT GLUCOSE   Result Value Ref Range    POCT Glucose 154 (H) 74 - 99 mg/dL   POCT GLUCOSE   Result Value Ref Range    POCT Glucose 153 (H) 74 - 99 mg/dL   Renal Function Panel   Result Value Ref Range    Glucose 185 (H) 65 - 99 mg/dL    Sodium 135 133 - 145 mmol/L    Potassium 4.0 3.4 - 5.1 mmol/L    Chloride 99 97 - 107 mmol/L    Bicarbonate 26 24 - 31 mmol/L    Urea Nitrogen 10 8 - 25 mg/dL    Creatinine 2.10 (H) 0.40 - 1.60 mg/dL    eGFR 25 (L) >60 mL/min/1.73m*2    Calcium 8.4 (L) 8.5 - 10.4 mg/dL    Phosphorus 1.7 (L) 2.5 - 4.5 mg/dL    Albumin 2.6 (L) 3.5 - 5.0 g/dL    Anion  Gap 10 <=19 mmol/L   CBC and Auto Differential   Result Value Ref Range    WBC 14.1 (H) 4.4 - 11.3 x10*3/uL    nRBC 0.0 0.0 - 0.0 /100 WBCs    RBC 3.54 (L) 4.00 - 5.20 x10*6/uL    Hemoglobin 10.0 (L) 12.0 - 16.0 g/dL    Hematocrit 30.0 (L) 36.0 - 46.0 %    MCV 85 80 - 100 fL    MCH 28.2 26.0 - 34.0 pg    MCHC 33.3 32.0 - 36.0 g/dL    RDW 23.4 (H) 11.5 - 14.5 %    Platelets 215 150 - 450 x10*3/uL    Neutrophils % 66.2 40.0 - 80.0 %    Immature Granulocytes %, Automated 0.7 0.0 - 0.9 %    Lymphocytes % 18.5 13.0 - 44.0 %    Monocytes % 10.1 2.0 - 10.0 %    Eosinophils % 3.6 0.0 - 6.0 %    Basophils % 0.9 0.0 - 2.0 %    Neutrophils Absolute 9.33 (H) 1.60 - 5.50 x10*3/uL    Immature Granulocytes Absolute, Automated 0.10 0.00 - 0.50 x10*3/uL    Lymphocytes Absolute 2.60 0.80 - 3.00 x10*3/uL    Monocytes Absolute 1.42 (H) 0.05 - 0.80 x10*3/uL    Eosinophils Absolute 0.50 (H) 0.00 - 0.40 x10*3/uL    Basophils Absolute 0.12 (H) 0.00 - 0.10 x10*3/uL   Morphology   Result Value Ref Range    RBC Morphology See Below     Polychromasia Mild     Target Cells Many     Basophilic Stippling Present     Clumped Platelets Present    POCT GLUCOSE   Result Value Ref Range    POCT Glucose 175 (H) 74 - 99 mg/dL         SIGNATURE: Saeed Mondragon MD PATIENT NAME: Ayanna Gross   DATE: December 27, 2023 MRN: [id number]   TIME: 8:59 AM PAGER: [phone]

## 2023-12-27 NOTE — NURSING NOTE
Pt has a L femoral TLC, drsg dry and intact, no redness, swelling or drainage, pt states drsg was changed last night, both white and blue lumens have brisk blood return and flush easily with NS, both clamped and curos cap applied, the other lumen currently in use and infusing without any difficulty. Pt also has a R chest dialysis catheter, drsg dry and intact (dated 12/22) without any redness, swelling or drainage.

## 2023-12-27 NOTE — CARE PLAN
The patient's goals for the shift include rest    The clinical goals for the shift include wean of levophed    Over the shift, the patient did  make progress toward the following goals. Barriers to progression include  levophed gtt decreased throughout shift. Recommendations to address these barriers include fluids  monitoring.

## 2023-12-27 NOTE — PROGRESS NOTES
Ayanna Gross is a 73 y.o. female on day 21 of admission presenting with Hypotension, unspecified hypotension type.    Subjective   Interval History:   Afebrile, no chills  Has some loose bowel movements  On low-dose pressors  No cough, chest pain or shortness of breath  No abdominal pain, nausea or vomiting        Review of Systems   Gastrointestinal:  Positive for diarrhea.   All other systems reviewed and are negative.      Objective   Range of Vitals (last 24 hours)  Heart Rate:  []   Temp:  [35.9 °C (96.6 °F)-37.4 °C (99.3 °F)]   Resp:  [7-26]   BP: (96)/(43)   SpO2:  [85 %-100 %]   Daily Weight  12/26/23 : 69.4 kg (153 lb)    Body mass index is 27.98 kg/m².    Physical Exam  Constitutional:       Appearance: Normal appearance.   HENT:      Head: Normocephalic and atraumatic.      Nose: Nose normal.   Eyes:      Extraocular Movements: Extraocular movements intact.      Conjunctiva/sclera: Conjunctivae normal.   Cardiovascular:      Rate and Rhythm: Regular rhythm.      Heart sounds: Normal heart sounds.   Pulmonary:      Breath sounds: Decreased breath sounds present.   Abdominal:      General: Bowel sounds are normal.      Palpations: Abdomen is soft.   Skin:     Comments: Bilateral heel eschar    Neurological:      Mental Status: She is alert and oriented to person, place, and time.     Antibiotics  sodium chloride 0.9 % bolus 500 mL  cefepime (Maxipime) 1 g in dextrose 5 % 50 mL IV  vancomycin-diluent combo no.1 (Xellia) IVPB 1 g  apixaban (Eliquis) tablet 2.5 mg  calcium acetate (Phoslo) capsule 2,000 mg  escitalopram (Lexapro) tablet 10 mg  febuxostat (Uloric) tablet 40 mg  fenofibrate (Triglide) tablet 160 mg  gabapentin (Neurontin) capsule 300 mg  metoprolol succinate XL (Toprol-XL) 24 hr tablet 12.5 mg  midodrine (Proamatine) tablet 10 mg  nystatin (Mycostatin) 100,000 unit/gram powder 1 Application  B complex-vitamin C tablet 1 tablet  simvastatin (Zocor) tablet 40 mg  acetaminophen (Tylenol)  tablet 650 mg  acetaminophen (Tylenol) tablet 650 mg  polyethylene glycol (Glycolax, Miralax) packet 17 g  dextrose 50 % injection 25 g  glucagon (Glucagen) injection 1 mg  dextrose 10 % in water (D10W) infusion  insulin lispro (HumaLOG) injection 0-5 Units  zinc oxide 20 % ointment 1 Application  calcium acetate (Phoslo) capsule 667 mg  vancomycin (Vancocin) capsule 125 mg  lidocaine PF (Xylocaine) 10 mg/mL (1 %) injection  heparin 1,000 unit/mL injection 2,000 Units  heparin 1,000 unit/mL injection 2,000 Units  midodrine (Proamatine) tablet 15 mg  albumin human 25 % solution 25 g  polyethylene glycol (Glycolax, Miralax) packet 17 g  collagenase 250 unit/gram ointment  vancomycin (Vancocin) 1,750 mg in dextrose 5 % in water (D5W) 250 mL IV  vancomycin-diluent combo no.1 (Xellia) IVPB 1,750 mg  albumin human 25 % solution 12.5 g  heparin 1,000 unit/mL injection 2,000 Units  heparin 1,000 unit/mL injection 2,000 Units  vancomycin (Vancocin) placeholder  doxycycline (Vibramycin) capsule 100 mg  potassium chloride CR (Klor-Con) ER tablet 10 mEq  gabapentin (Neurontin) capsule 100 mg  heparin 1,000 unit/mL injection 2,000 Units  heparin 1,000 unit/mL injection 2,000 Units  piperacillin-tazobactam-dextrose (Zosyn) IV 2.25 g  doxycycline (Vibramycin) capsule 100 mg  ipratropium-albuteroL (Duo-Neb) 0.5-2.5 mg/3 mL nebulizer solution 3 mL  heparin 1,000 unit/mL injection 2,000 Units  heparin 1,000 unit/mL injection 2,000 Units  ipratropium-albuteroL (Duo-Neb) 0.5-2.5 mg/3 mL nebulizer solution 3 mL  dexAMETHasone (Decadron) injection 6 mg  albumin human 25 % solution 25 g  epoetin di-epbx (Retacrit) injection 10,000 Units  lidocaine PF (Xylocaine) 20 mg/mL (2 %) injection  potassium chloride CR (Klor-Con) ER tablet 10 mEq  albumin human 5 % infusion 25 g  potassium chloride CR (Klor-Con M20) ER tablet 20 mEq  norepinephrine (Levophed) 8 mg in dextrose 5% 250 mL (0.032 mg/mL) infusion (premix)  oxygen (O2)  therapy  piperacillin-tazobactam-dextrose (Zosyn) IV 2.25 g  heparin 1,000 unit/mL injection 2,000 Units  heparin 1,000 unit/mL injection 2,000 Units  potassium chloride 20 mEq in 100 mL IV premix  fidaxomicin (Dificid) tablet 200 mg  norepinephrine (Levophed) 8 mg in dextrose 5% 250 mL (0.032 mg/mL) infusion (premix)  heparin 1,000 unit/mL injection 2,000 Units  heparin 1,000 unit/mL injection 2,000 Units  HYDROcodone-acetaminophen (Norco) 5-325 mg per tablet 1 tablet  dextrose 50 % injection 25 g  glucagon (Glucagen) injection 1 mg  dextrose 10 % in water (D10W) infusion  potassium chloride (Klor-Con) packet 20 mEq  potassium chloride 20 mEq in 100 mL IV premix  cosyntropin (Cortrosyn) injection 250 mcg  heparin 1,000 unit/mL injection 2,000 Units  heparin 1,000 unit/mL injection 2,000 Units  heparin 1,000 unit/mL injection 2,000 Units  heparin 1,000 unit/mL injection 2,000 Units  cholestyramine (Questran) 4 gram packet 4 g  potassium phosphate (monobasic) (K-Phos) tablet 500 mg      Relevant Results  Labs  Results from last 72 hours   Lab Units 12/27/23 0522 12/26/23 0439 12/25/23  0409   WBC AUTO x10*3/uL 14.1* 13.4* 14.0*   HEMOGLOBIN g/dL 10.0* 9.3* 9.6*   HEMATOCRIT % 30.0* 27.0* 29.5*   PLATELETS AUTO x10*3/uL 215 221 201   NEUTROS PCT AUTO % 66.2 65.9 68.7   LYMPHS PCT AUTO % 18.5 18.7 16.6   MONOS PCT AUTO % 10.1 8.9 8.7   EOS PCT AUTO % 3.6 4.8 4.1     Results from last 72 hours   Lab Units 12/27/23 0522 12/26/23 0439 12/25/23  0409   SODIUM mmol/L 135 132* 135   POTASSIUM mmol/L 4.0 3.7 3.7   CHLORIDE mmol/L 99 98 98   CO2 mmol/L 26 24 27   BUN mg/dL 10 15 8   CREATININE mg/dL 2.10* 3.10* 2.00*   GLUCOSE mg/dL 185* 138* 162*   CALCIUM mg/dL 8.4* 8.1* 8.2*   ANION GAP mmol/L 10 10 10   EGFR mL/min/1.73m*2 25* 16* 26*   PHOSPHORUS mg/dL 1.7* 3.0 1.7*     Results from last 72 hours   Lab Units 12/27/23  0522 12/26/23  0439 12/25/23  0409   ALK PHOS U/L  --  108  --    BILIRUBIN TOTAL mg/dL  --  1.1  --     PROTEIN TOTAL g/dL  --  5.5*  --    ALT U/L  --  7  --    AST U/L  --  14  --    ALBUMIN g/dL 2.6* 2.5* 2.8*     Estimated Creatinine Clearance: 22.4 mL/min (A) (by C-G formula based on SCr of 2.1 mg/dL (H)).  C-Reactive Protein   Date Value Ref Range Status   12/25/2023 5.20 (H) 0.00 - 2.00 mg/dL Final     CRP   Date Value Ref Range Status   06/23/2023 19.9 (H) 0 - 2.0 MG/DL Final     Comment:     Performed at [address] [address]   05/22/2022 1.0 0 - 2.0 MG/DL Final     Comment:     Performed at [address] [address]     Microbiology  Reviewed  Imaging  XR chest 1 view    Result Date: 12/26/2023  Interpreted By:  Rikki Patel, STUDY: XR CHEST 1 VIEW; 12/26/2023 12:23 pm   INDICATION: Signs/Symptoms:hypoxia   COMPARISON: 12/19/2023   ACCESSION NUMBER(S): [id number]   ORDERING CLINICIAN: BRITTANI TOLEDO   FINDINGS: The study is limited due to rotation. Dialysis catheter is stable in position. The cardiac silhouette is indeterminate due to the technique. There is interval significant worsening of large left effusion and left lung atelectasis/infiltrates, with complete opacification of the left hemithorax. There is no pneumothorax. Vascular stents are noted in subclavian vessels. Surgical staples are again seen in the left axilla. The osseous structures are unchanged.       Limited study. Interval worsening of large left pleural effusion and left lung infiltrates/atelectasis, with complete opacification of the left hemithorax.   Signed by: Rikki Patel 12/26/2023 12:40 PM Dictation workstation:   [id number]    US thoracentesis    Result Date: 12/20/2023  Interpreted By:  Todd La, STUDY: US THORACENTESIS;  12/15/2023 3:43 pm   INDICATION: Signs/Symptoms:L pleural effusion.   COMPARISON: Chest x-rayDated 12/14/2023.   ACCESSION NUMBER(S): [id number]   ORDERING CLINICIAN: PARIS LUCAS   TECHNIQUE: INTERVENTIONALIST(S): Todd La M.D.   CONSENT:  The patient/patient's POA/next of kin was informed of the nature of the proposed procedure. The purposes, alternatives, risks, and benefits were explained and discussed. All questions were answered and consent was obtained.   SEDATION: None   MEDICATION/CONTRAST: No additional   TIME OUT: A time out was performed immediately prior to procedure start with the interventional team, correctly identifying the patient name, date of birth, MRN, procedure, anatomy (including marking of site and side), patient position, procedure consent form, relevant laboratory and imaging test results, antibiotic administration, safety precautions, and procedure-specific equipment needs.   FINDINGS: The patient was placed in the sitting position.   The pleural space was examined with grey scale ultrasound, and the most accessible fluid identified and marked for thoracentesis.   The skin was prepped and draped in usual manner. Local anesthesia with Lidocaine was administered and a  left-sided thoracentesis was performed.  A 5 [place] One-Step thoracentesis needle/catheter was then placed where marked.  Approximately 600 mL of yellowish colored fluid was removed.  The needle/catheter was then withdrawn.   The patient tolerated the procedure well and there were no immediate complications. Specimen(s) sent to the laboratory and pathology for further evaluation, per the requesting team.       Uneventful  left-sided thoracentesis, as detailed above.   I personally performed and/or directly supervised this study and was present for the entire procedure.   I personally reviewed the study and resident interpretation. I agree with the findings as stated.   Performed and dictated at [facility].   MACRO: None   Signed by: Todd La 12/20/2023 9:48 PM Dictation workstation:   [id number]    XR chest 1 view    Result Date: 12/19/2023  Interpreted By:  Judd Lee, STUDY: XR CHEST 1 VIEW; 12/19/2023 5:44  am   INDICATION: Signs/Symptoms:pleural effusion.   COMPARISON: December 14, 2023   ACCESSION NUMBER(S): [id number]   ORDERING CLINICIAN: PARIS LUCAS   FINDINGS: RESULT: Right sided central venous catheter is again noted extending into the right atrium. The distal tip is not well visualized. Left-sided vascular stents are noted in the left subclavian region. Left axillary surgical clips are noted. The cardiac silhouette is within normal limits for size. Mediastinal contours are unremarkable. Left basilar opacity obscures the left hemidiaphragm with hazy opacity throughout the left lung. There are degenerative changes of the thoracic spine and shoulders.       Persistent left basilar opacity obscures the left hemidiaphragm with hazy opacity extending through the left hemithorax. Findings likely represent pleural effusion atelectasis. Underlying infiltrate or mass is not excluded. Overall findings not significantly changed compared to previous exam     Signed by: Judd Lee 12/19/2023 9:02 AM Dictation workstation:   [id number]    XR chest 1 view    Result Date: 12/14/2023  Interpreted By:  Judd Lee, STUDY: XR CHEST 1 VIEW; 12/14/2023 10:43 am   INDICATION: Hypoxic respiratory failure.   COMPARISON: December 8, 2023   ACCESSION NUMBER(S): [id number]   ORDERING CLINICIAN: RODRI SURESH   FINDINGS: RESULT: Right-sided double-lumen dialysis catheter is noted with distal tip at the level of the right atrium. Vascular stents are noted in the left subclavian region. Surgical clips are noted in the left axilla. Cardiac silhouette size is indeterminate as the left heart border is obscured. There are calcifications involving the thoracic aorta. Left basilar obscurity obscures the left hemidiaphragm and left heart border with hazy opacity extending to the left mid lung. There are degenerative changes of the bilateral shoulders.       Increasing density in the left mid and lower lung suggests increasing  infiltrate or pleural effusion.     Signed by: Judd Lee 12/14/2023 10:58 AM Dictation workstation:   [id number]    Vascular US ankle brachial index (OCTAVIO) without exercise    Result Date: 12/13/2023           [place] [id number]            Phone [phone]  Vascular Lab Report  [facility] US ANKLE BRACHIAL INDEX (OCTAVIO) WITHOUT EXERCISE Patient Name:      BASIA Gray Physician: [id number] Lizet Messer MD Study Date:        12/11/2023          Ordering Provider: [id number] MARIANN SUTTON MRN/PID:           [id number]            Fellow: Accession#:        [id number]        Technologist:      Luz Maria Trivedi RVT Date of Birth/Age: 1950 / 73 years Technologist 2:    Dara Parr RVT Gender:            F                   Encounter#:        [id number] Admission Status:  Inpatient           Location           [facility]                                        Performed:  Diagnosis/ICD: Other specified symptoms and signs involving the circulatory and                respiratory systems-R09.89 Indication:    Peripheral vascular disease CPT Codes:     19486.52 Peripheral artery OCTAVIO Only Reduced Service  Pertinent History: Absent pedal pulses. Patient is on dialysis.  CONCLUSIONS: Right Lower PVR: Evidence of moderate arterial occlusive disease in the right lower extremity at rest. Right pressures of >220 mmHg suggest no compressibility of vessels and may make absolute Segmental Limb Pressures (SLP) unreliable. Decreased digital perfusion noted. Biphasic flow is noted in the right dorsalis pedis artery. Left Lower PVR: Evidence of moderate arterial occlusive disease in the left lower extremity at rest. Left pressures of >220 mmHg suggest no compressibility of vessels and may make absolute Segmental Limb Pressures (SLP) unreliable. Decreased digital perfusion noted. Biphasic flow is noted in the left  dorsalis pedis artery. Additional Findings: Technically difficult and limited exam due to patient's positioning, movement and inability to cooperate with exam.  Imaging & Doppler Findings:  RIGHT Lower PVR              Pressures Right Dorsalis Pedis (Ankle) 255 mmHg   LEFT Lower PVR              Pressures Left Dorsalis Pedis (Ankle) 255 mmHg   [id number] Lizet Messer MD Electronically signed by [id number] Lizet Messer MD on 12/13/2023 at 8:38:56 AM  ** Final **     CT head wo IV contrast    Result Date: 12/11/2023  Interpreted By:  Brendon Hook, STUDY: CT HEAD WO IV CONTRAST; 12/11/2023 5:20 pm   INDICATION: Signs/Symptoms:acute confusion.   COMPARISON: 20 August 2023   ACCESSION NUMBER(S): [id number]   ORDERING CLINICIAN: RODRI SURESH   TECHNIQUE: CT was performed with one or more of the following dose reduction techniques: automated exposure control, adjustment of the mA and/or kV according to patient size, or use of iterative reconstruction technique.       PROCEDURE: 3.0 mm axial images were obtained through the brain, to include the posterior fossa without intravenous contrast enhancement.   FINDINGS: The patient's head is turned to the left and slightly canted in the gantry. Mild motion artifact is seen. There is symmetric volume loss of the cerebral hemispheres. Moderate  decreased attenuation in the bilateral periventricular white matter, consistent with microangiopathy is noted.  There is no encephalomalacic change. Brainstem is unremarkable. Cerebellar hemispheres are symmetric. No subarachnoid, intraparenchymal, subdural or interventricular hemorrhage. No intra- or extra-axial mass or abnormal blood products are demonstrated. Hyperostosis frontalis interna is seen. Slight hypoplasia of the left mastoid.   The paranasal sinuses and mastoids as well as calvarium are unremarkable.       1. No acute intracranial findings. 2. Symmetric volume loss of the cerebral hemispheres. 3. Periventricular Microangiopathy. 4.  No posttraumatic abnormality.   This report has been produced using speech recognition. This exam is available in DICOM format to non-affiliated healthcare facilities on a secure media free searchable basis with prior patient authorization. The patient exposure is reported to a radiation dose index registry. All CT examinations are performed with one or more of the following dose reduction techniques: Automated Exposure Control, Adjustment of mA and/or KV according to patient size, or use of iterative reconstruction techniques.   MACRO: None   Signed by: Brendon Hook 12/11/2023 7:05 PM Dictation workstation:   [id number]    US thoracentesis    Result Date: 12/8/2023  Interpreted By:  Sandro Hernandez, STUDY: US THORACENTESIS; 12/7/2023 3:16 pm   INDICATION: Signs/Symptoms:Left pleural effusion question of whether this needs thoracentesis, history of ESRD, just had dialysis today.   COMPARISON: None   ACCESSION NUMBER(S): [id number]   ORDERING CLINICIAN: JEZ LONG   TECHNIQUE: Informed consent obtained. Patient positionedsitting upright. Skin prepped, draped and anesthetized. Under ultrasound guidance, a centesis catheter/needle was advanced into rightpleural cavity.   FINDINGS: A total of 750 cc of clear yellow fluid was aspirated. A sample was sent for analysis. The patient tolerated the procedure well.       Ultrasound-guided left thoracentesis.     Signed by: Sandro Hernandez 12/8/2023 3:01 PM Dictation workstation:   [id number]    XR chest 1 view    Result Date: 12/8/2023  Interpreted By:  Maria Garcia, STUDY: XR CHEST 1 VIEW 12/8/2023 11:57 am   INDICATION: Signs/Symptoms:S/P thoracentesis   COMPARISON: 12/06/2023   ACCESSION NUMBER(S): [id number]   ORDERING CLINICIAN: JEZ LONG   TECHNIQUE: AP erect view of the chest at bedside   FINDINGS: There is an interval decrease in size of left pleural effusion when compared with the study done 2 days earlier. No pneumothorax is seen.   There is some  residual left pleural effusion with infiltrates seen in the left mid and lower lung field. The right lung is clear.   There are surgical clips within the left axilla. Stent grafts are visible in the left subclavian region extending into the left axillary region. A tunneled right jugular dialysis catheter terminates within right atrium.       Decreased size of left effusion following ultrasound-guided thoracentesis with no pneumothorax.   There is some residual left pleural effusion noted with infiltrate in the left mid and lower lung field.   Signed by: Maria Garcia 12/8/2023 12:51 PM Dictation workstation:   [id number]    XR foot 3+ views bilateral    Result Date: 12/7/2023  Interpreted By:  Brendon Hook, STUDY: XR FOOT 3+ VIEWS BILATERAL; 12/7/2023 3:31 pm   INDICATION: Signs/Symptoms:Wound/Abscess/Acute neha process rule out/Comparative Exam.   COMPARISON: None   ACCESSION NUMBER(S): [id number]   ORDERING CLINICIAN: JOSSELYN STAHL   TECHNIQUE: Nonweightbearing AP, lateral and oblique views of the bilateral foot were performed.   FINDINGS: Right foot: Large heel spur is seen. Mild Achilles enthesopathy demonstrated. Moderate atherosclerotic disease is seen. There is suggestion of loss of the arch on this nonweightbearing view. Degenerative change anterior plafond demonstrated and dorsal talus as well as posterior plafond and dorsal talus. Sclerosis anterior calcaneus is seen. Degenerative changes mild between the midfoot and forefoot. 1st metatarsophalangeal joint degenerative changes demonstrated. 2nd ray absent phalanges. Distal phalanges of the 3rd through 5th rays with overlap were difficult to visualize. Distal phalanx of the great toe is difficult to visualize.   Left foot: Large heel spur is seen. Advanced atherosclerotic disease demonstrated. Degenerative change in the calcaneus and talus seen with moderate anterior and mild posterior plafond degenerative change. Mild degenerative change in the  midfoot and forefoot is seen. Distal phalanx of the great toe is absent. Degenerative change of the 2nd ray phalanges and 3rd ray phalanges seen with degenerative change of the 4th and 5th digits not well seen.       1. Right foot: Large heel spur. Atherosclerotic disease. Suggestion of diffuse osteopenia. No sclerosis to suggest osteomyelitis. No subcutaneous air to suggest gangrene. Absent flanges 2nd ray with difficult to visualized distal phalanges of the other digits.   2. Left foot: Large heel spur. Degenerative changes of the hindfoot midfoot and forefoot with atherosclerotic disease. Absent distal phalanx of the great toe. No periostitis or abnormal sclerosis with diffuse osteopenia to suggest osteomyelitis. No subcutaneous air to suggest gangrene.       Signed by: Brendon Hook 12/7/2023 8:39 PM Dictation workstation:   [id number]    ECG 12 Lead    Result Date: 12/7/2023   Poor data quality, interpretation may be adversely affected Normal sinus rhythm Right bundle branch block Septal infarct , age undetermined Possible Lateral infarct , age undetermined Abnormal ECG No previous ECGs available Confirmed by Hammad Callahan ([id number]) on 12/7/2023 1:07:11 PM    XR chest 1 view    Result Date: 12/6/2023  Interpreted By:  Roger Murdock, STUDY: XR CHEST 1 VIEW;  12/6/2023 2:08 pm   INDICATION: Signs/Symptoms:Brief AMS and hypotension, now resolved.   COMPARISON: 08/21/2023..   ACCESSION NUMBER(S): [id number]   ORDERING CLINICIAN: PATEL TINSLEY   FINDINGS: New moderate left pleural effusion. There is silhouetting of the left heart border. Left subclavian stent present. Right-sided double-lumen central venous catheter terminates within the right ventricle. No acute osseous findings.       New, moderate left pleural effusion.     MACRO: None   Signed by: Roger Murdock 12/6/2023 2:18 PM Dictation workstation:   [id number]     Assessment/Plan   Shock-etiology unclear-on low-dose pressors  Unstageable Bilateral heel  ulcers  C-difficile infection-resolving  Type 2 diabetes mellitus with peripheral angiopathy with gangrene   acute respiratory failure-resolved  Coronavirus infection  Pleural effusion, s/p thoracentesis  History of chronic MRSA bacteremia, infective endocarditis-completed IV vancomycin, now on Doxycycline   ESRD on hemodialysis  Severe malnutrition-prealbumin of 3.2           Dificid-completed  Oral vancomycin-duration depends on clinical response  Questran  Oral doxycycline as suppressive therapy for MRSA bacteremia with possible infective endocarditis  Monitor temperature  and WBC  Local care  Offloading  High-protein diet  Monitor stool  Podiatry follow up  Wean off pressors   droplet plus precautions  Contact plus precautions      Stephen Coulter MD

## 2023-12-27 NOTE — PROGRESS NOTES
Dc plan is for pt to go to [facility]. Pt will require a precert. TCC will start precert closer to when pt is ready for dc. Will follow up in report with MD this morning to get estimated ADOD.    Safe dc plan not secured-need precert    Hanane ROSALESA, LSW

## 2023-12-27 NOTE — NURSING NOTE
Wound images taken  per protocol  drsg change done as ordered  protective borders on sacrum and right hip  pt remains on waffle matres off lading boots

## 2023-12-28 NOTE — PROGRESS NOTES
Occupational Therapy    Evaluation    Patient Name: Ayanna Gross  MRN: [id number]  Today's Date: 12/28/2023  Time Calculation  Start Time: 1113  Stop Time: 1128  Time Calculation (min): 15 min    Assessment  IP OT Assessment  OT Assessment: Pt presents on re-eval with increased overall weakness, multiple medical issues limiting activity, and cognitive deficits. Will trial OT to assess for any functional progress.  Prognosis: Poor  Evaluation/Treatment Tolerance: Treatment limited secondary to medical complications (Comment) (low BP and multiple wounds)  End of Session Communication: Bedside nurse  End of Session Patient Position: Bed, 3 rail up, Alarm off, not on at start of session (Needs in reach.)    Plan:  Treatment Interventions: ADL retraining, UE strengthening/ROM, Endurance training, Cognitive reorientation, Patient/family training, Neuromuscular reeducation, Equipment evaluation/education, Compensatory technique education, Fine motor coordination activities  OT Frequency: 2 times per week  OT Discharge Recommendations: Moderate intensity level of continued care  OT - OK to Discharge: Yes    Subjective     General:  General  Reason for Referral: Re-eval  Referred By: Veronica Yepez MD  Past Medical History Relevant to Rehab: PAF, depression, anxiety, GIB, gout, skin CA, Breast CA, CVA, chronic pain, STARR, OA, OH, PVD, DM, ESRD on HD, B heel ulcers, sacral ulcer  Family/Caregiver Present: No  Co-Treatment: PT  Co-Treatment Reason: medically complex re-evaluation in ICU, patient requiring 2 skilled persons for mobility and caregiver/patient safety  Patient Position Received: Bed, 2 rail up, Alarm off, not on at start of session  General Comment: pt cleared for re-evaluation for therapy, pt supine in bed, R LE off the bed, unable to lift back into bed. pt agreeable to therapy.    Precautions:  Hearing/Visual Limitations: wears glasses  LE Weight Bearing Status: Toe-touch weight bearing with block (Per orders  for transfers only)  Medical Precautions: Fall precautions, Oxygen therapy device and L/min, Infection precautions (2L 02)  Precautions Comment: COVID+, Cdiff+    Vital Signs:  Heart Rate: 97  Heart Rate Source: Monitor  SpO2: 97 %  BP: (!) 88/44  MAP (mmHg): 58    Pain:  Pain Assessment  Pain Assessment: 0-10  Pain Score: 0 - No pain    Objective     Cognition:  Overall Cognitive Status: Impaired  Orientation Level: Oriented X4  Cognition Comments: Pt appears intermittently confused throughout re-eval.    Home Living:  Type of Home: House  Lives With: Significant other  Home Adaptive Equipment: Wheelchair-manual, Walker rolling or standard, Other (Comment)  Home Layout: One level, Able to live on main level with bedroom/bathroom  Home Access: Ramped entrance  Bathroom Shower/Tub: Walk-in shower  Bathroom Toilet: Handicapped height  Bathroom Equipment: Grab bars in shower, Shower chair with back     Prior Function:  Level of [place]: Needs assistance with ADLs, Needs assistance with homemaking  Receives Help From: Family  ADL Assistance: Needs assistance  Homemaking Assistance: Needs assistance  Ambulatory Assistance: Needs assistance  Hand Dominance: Right  Prior Function Comments: patient reports that she is independent with ADLs, except her daughters come to assist with jeaneth hygiene tasks.    ADL:  Eating Assistance: Maximal  Grooming Assistance: Total  Bathing Assistance: Total  UE Dressing Assistance: Total  LE Dressing Assistance: Total  Toileting Assistance with Device: Total  Toileting Deficit: Fecal Management System, Urinary Catheter    Activity Tolerance:  Activity Tolerance Comments: Poor due to several days of inactivity and increased overall weakness    Bed Mobility/Transfers: Bed Mobility  Bed Mobility:  (Pt completed rolling both sides in bed with mod A required while removing, adjusting, and replacing pads, etc.)    Transfers  Transfer: No    Sensation:  Sensation Comment: Pt reports numbness  in her bilateral hands/digits.    Strength:  Strength Comments: GWENDOLYN's 2-/5 shoulders and 3- to 3/5 distally    Coordination:  Coordination Comment: GWENDOLYN's impaired     Hand Function:  Hand Function  Gross Grasp: Impaired  Coordination: Impaired      Outcome Measures: [facility] Daily Activity  Putting on and taking off regular lower body clothing: Total  Bathing (including washing, rinsing, drying): Total  Putting on and taking off regular upper body clothing: Total  Toileting, which includes using toilet, bedpan or urinal: Total  Taking care of personal grooming such as brushing teeth: Total  Eating Meals: A lot  Daily Activity - Total Score: 7    Education Documentation  Home Exercise Program, taught by Elie Mcneil Jr., OT at 12/28/2023 12:32 PM.  Learner: Patient  Readiness: Acceptance  Method: Explanation  Response: Needs Reinforcement    ADL Training, taught by Elie Mcneil Jr., OT at 12/28/2023 12:32 PM.  Learner: Patient  Readiness: Acceptance  Method: Explanation  Response: Needs Reinforcement    Education Comments  No comments found.    Goals:   Problem: OT Goals  Goal: Pt will complete self-feeding with min A.  Outcome: Progressing  Goal: Pt will complete all bed mobility and tolerate sitting EOB unsupported for 10 minutes with F balance and with close S.  Outcome: Progressing  Goal: Pt will participate in BUE AAROM/AROM exercises in order to enhance functional performance.  Outcome: Progressing

## 2023-12-28 NOTE — PROGRESS NOTES
Ayanna Gross is a 73 y.o. female on day 22 of admission presenting with Hypotension, unspecified hypotension type.    Subjective   Interval History:   Afebrile, no chills  On low-dose pressors  No cough, chest pain or shortness of breath  No abdominal pain, nausea or vomiting   Still with liquid stools        Objective   Range of Vitals (last 24 hours)  Heart Rate:  []   Temp:  [36.6 °C (97.9 °F)-37.2 °C (98.9 °F)]   Resp:  [16-23]   BP: (67-92)/(30-49)   Weight:  [67.2 kg (148 lb 2.4 oz)]   SpO2:  [88 %-100 %]   Daily Weight  12/28/23 : 67.2 kg (148 lb 2.4 oz)    Body mass index is 27.09 kg/m².    Physical Exam  Constitutional:       Appearance: Normal appearance.   HENT:      Head: Normocephalic and atraumatic.      Nose: Nose normal.   Eyes:      Extraocular Movements: Extraocular movements intact.      Conjunctiva/sclera: Conjunctivae normal.   Cardiovascular:      Rate and Rhythm: Regular rhythm.      Heart sounds: Normal heart sounds.   Pulmonary:      Breath sounds: Decreased breath sounds present.   Abdominal:      General: Bowel sounds are normal.      Palpations: Abdomen is soft.   Skin:     Comments: Bilateral heel eschar    Neurological:      Mental Status: She is alert and oriented to person, place, and time.     Antibiotics  sodium chloride 0.9 % bolus 500 mL  cefepime (Maxipime) 1 g in dextrose 5 % 50 mL IV  vancomycin-diluent combo no.1 (Xellia) IVPB 1 g  apixaban (Eliquis) tablet 2.5 mg  calcium acetate (Phoslo) capsule 2,000 mg  escitalopram (Lexapro) tablet 10 mg  febuxostat (Uloric) tablet 40 mg  fenofibrate (Triglide) tablet 160 mg  gabapentin (Neurontin) capsule 300 mg  metoprolol succinate XL (Toprol-XL) 24 hr tablet 12.5 mg  midodrine (Proamatine) tablet 10 mg  nystatin (Mycostatin) 100,000 unit/gram powder 1 Application  B complex-vitamin C tablet 1 tablet  simvastatin (Zocor) tablet 40 mg  acetaminophen (Tylenol) tablet 650 mg  acetaminophen (Tylenol) tablet 650 mg  polyethylene  glycol (Glycolax, Miralax) packet 17 g  dextrose 50 % injection 25 g  glucagon (Glucagen) injection 1 mg  dextrose 10 % in water (D10W) infusion  insulin lispro (HumaLOG) injection 0-5 Units  zinc oxide 20 % ointment 1 Application  calcium acetate (Phoslo) capsule 667 mg  vancomycin (Vancocin) capsule 125 mg  lidocaine PF (Xylocaine) 10 mg/mL (1 %) injection  heparin 1,000 unit/mL injection 2,000 Units  heparin 1,000 unit/mL injection 2,000 Units  midodrine (Proamatine) tablet 15 mg  albumin human 25 % solution 25 g  polyethylene glycol (Glycolax, Miralax) packet 17 g  collagenase 250 unit/gram ointment  vancomycin (Vancocin) 1,750 mg in dextrose 5 % in water (D5W) 250 mL IV  vancomycin-diluent combo no.1 (Xellia) IVPB 1,750 mg  albumin human 25 % solution 12.5 g  heparin 1,000 unit/mL injection 2,000 Units  heparin 1,000 unit/mL injection 2,000 Units  vancomycin (Vancocin) placeholder  doxycycline (Vibramycin) capsule 100 mg  potassium chloride CR (Klor-Con) ER tablet 10 mEq  gabapentin (Neurontin) capsule 100 mg  heparin 1,000 unit/mL injection 2,000 Units  heparin 1,000 unit/mL injection 2,000 Units  piperacillin-tazobactam-dextrose (Zosyn) IV 2.25 g  doxycycline (Vibramycin) capsule 100 mg  ipratropium-albuteroL (Duo-Neb) 0.5-2.5 mg/3 mL nebulizer solution 3 mL  heparin 1,000 unit/mL injection 2,000 Units  heparin 1,000 unit/mL injection 2,000 Units  ipratropium-albuteroL (Duo-Neb) 0.5-2.5 mg/3 mL nebulizer solution 3 mL  dexAMETHasone (Decadron) injection 6 mg  albumin human 25 % solution 25 g  epoetin di-epbx (Retacrit) injection 10,000 Units  lidocaine PF (Xylocaine) 20 mg/mL (2 %) injection  potassium chloride CR (Klor-Con) ER tablet 10 mEq  albumin human 5 % infusion 25 g  potassium chloride CR (Klor-Con M20) ER tablet 20 mEq  norepinephrine (Levophed) 8 mg in dextrose 5% 250 mL (0.032 mg/mL) infusion (premix)  oxygen (O2) therapy  piperacillin-tazobactam-dextrose (Zosyn) IV 2.25 g  heparin 1,000 unit/mL  injection 2,000 Units  heparin 1,000 unit/mL injection 2,000 Units  potassium chloride 20 mEq in 100 mL IV premix  fidaxomicin (Dificid) tablet 200 mg  norepinephrine (Levophed) 8 mg in dextrose 5% 250 mL (0.032 mg/mL) infusion (premix)  heparin 1,000 unit/mL injection 2,000 Units  heparin 1,000 unit/mL injection 2,000 Units  HYDROcodone-acetaminophen (Norco) 5-325 mg per tablet 1 tablet  dextrose 50 % injection 25 g  glucagon (Glucagen) injection 1 mg  dextrose 10 % in water (D10W) infusion  potassium chloride (Klor-Con) packet 20 mEq  potassium chloride 20 mEq in 100 mL IV premix  cosyntropin (Cortrosyn) injection 250 mcg  heparin 1,000 unit/mL injection 2,000 Units  heparin 1,000 unit/mL injection 2,000 Units  heparin 1,000 unit/mL injection 2,000 Units  heparin 1,000 unit/mL injection 2,000 Units  cholestyramine (Questran) 4 gram packet 4 g  potassium phosphate (monobasic) (K-Phos) tablet 500 mg      Relevant Results  Labs  Results from last 72 hours   Lab Units 12/28/23  0405 12/27/23 0522 12/26/23  0439   WBC AUTO x10*3/uL 11.3 14.1* 13.4*   HEMOGLOBIN g/dL 9.2* 10.0* 9.3*   HEMATOCRIT % 26.8* 30.0* 27.0*   PLATELETS AUTO x10*3/uL 226 215 221   NEUTROS PCT AUTO % 54.6 66.2 65.9   LYMPHS PCT AUTO % 26.7 18.5 18.7   MONOS PCT AUTO % 12.2 10.1 8.9   EOS PCT AUTO % 4.7 3.6 4.8       Results from last 72 hours   Lab Units 12/28/23  0405 12/27/23  0522 12/26/23  0439   SODIUM mmol/L 130* 135 132*   POTASSIUM mmol/L 5.0 4.0 3.7   CHLORIDE mmol/L 96* 99 98   CO2 mmol/L 23* 26 24   BUN mg/dL 18 10 15   CREATININE mg/dL 3.10* 2.10* 3.10*   GLUCOSE mg/dL 159* 185* 138*   CALCIUM mg/dL 8.3* 8.4* 8.1*   ANION GAP mmol/L 11 10 10   EGFR mL/min/1.73m*2 16* 25* 16*   PHOSPHORUS mg/dL 2.4* 1.7* 3.0       Results from last 72 hours   Lab Units 12/28/23  0405 12/27/23  0522 12/26/23  0439   ALK PHOS U/L  --   --  108   BILIRUBIN TOTAL mg/dL  --   --  1.1   PROTEIN TOTAL g/dL  --   --  5.5*   ALT U/L  --   --  7   AST U/L  --    --  14   ALBUMIN g/dL 2.5* 2.6* 2.5*       Estimated Creatinine Clearance: 15 mL/min (A) (by C-G formula based on SCr of 3.1 mg/dL (H)).  C-Reactive Protein   Date Value Ref Range Status   12/25/2023 5.20 (H) 0.00 - 2.00 mg/dL Final     CRP   Date Value Ref Range Status   06/23/2023 19.9 (H) 0 - 2.0 MG/DL Final     Comment:     Performed at [address] [address]   05/22/2022 1.0 0 - 2.0 MG/DL Final     Comment:     Performed at [address] [address]     Microbiology  Reviewed  Imaging  Reviewed    Assessment/Plan   Shock-etiology unclear-on low-dose pressors  Unstageable Bilateral heel ulcers  C-difficile infection-resolving  Type 2 diabetes mellitus with peripheral angiopathy with gangrene   acute respiratory failure-resolved  Coronavirus infection  Pleural effusion, s/p thoracentesis  History of chronic MRSA bacteremia, infective endocarditis-completed IV vancomycin, now on Doxycycline   ESRD on hemodialysis  Severe malnutrition-prealbumin of 3.2           Dificid-completed  Oral vancomycin-duration depends on clinical response  Questran  Oral doxycycline as suppressive therapy for MRSA bacteremia with possible infective endocarditis  Monitor temperature  and WBC  Local care  Offloading  High-protein diet  Monitor stool  Podiatry follow up  Wean off pressors  Droplet plus precautions-COVID  Contact plus precautions-c.diff      Katlyn Alexander, APRN-CNP

## 2023-12-28 NOTE — PROGRESS NOTES
Critical Care Progress Note    Ayanna Gross is a 73 y.o. female on day 22 of admission presenting with Hypotension, unspecified hypotension type.    Subjective   Chart review  Remains on Levophed.    Objective   Vital Signs      12/28/2023     4:00 AM 12/28/2023     5:00 AM 12/28/2023     5:39 AM 12/28/2023     5:53 AM 12/28/2023     6:00 AM 12/28/2023     6:13 AM 12/28/2023     7:21 AM   Vitals   Systolic   92       Diastolic   49       Heart Rate 97 102   99 99    Temp 37.2 °C (98.9 °F)      36.7 °C (98.1 °F)   Resp 16 19 22 21    Weight (lb)    148.15      BMI    27.09 kg/m2      BSA (m2)    1.71 m2          Oxygen Therapy  SpO2: 100 %  Medical Gas Therapy: Supplemental oxygen  O2 Delivery Method: Nasal cannula         Intake/Output previous 24 hours:    Intake/Output Summary (Last 24 hours) at 12/28/2023 1028  Last data filed at 12/27/2023 1727  Gross per 24 hour   Intake 389.9 ml   Output --   Net 389.9 ml       Physical exam deferred today.  Vitals show blood pressure in the 80s and 90s systolic f with a few exceptions noted     lines and Tubes:  CVC Double lumen Tunneled Right Subclavian (Active)   Earliest Known Present: 12/06/23   Lumen Type: Double lumen  CVC Type: Tunneled  Description (optional): Permacath  Orientation: Right  Location: Subclavian   Number of days: 20       CVC 12/20/23 Triple lumen Non-tunneled Left Femoral (Active)   Placement Date/Time: 12/20/23 2233   Hand Hygiene Performed Prior to CVC Insertion: Yes  Site Prep: Chlorhexidine   Site Prep Agent has Completely Dried Before Insertion: Yes  All 5 Sterile Barriers Used (Gloves, Gown, Cap, Mask, Large Sterile Drape):...   Number of days: 5       Peripheral IV 12/21/23 22 G Right Forearm (Active)   Placement Date/Time: 12/21/23 0130   Size (Gauge): 22 G  Orientation: Right  Location: Forearm  Site Prep: Alcohol   Number of days: 5       Arterial Line 12/16/23 Right Femoral (Active)   Placement Date/Time: 12/16/23 (c) 2503   Size: 18  G  Orientation: Right  Location: Femoral  Site Prep: Chlorhexidine   Local Anesthetic: Injectable  Insertion attempts: 1  Securement Method: Transparent dressing   Number of days: 9       Rectal Tube With balloon (Active)   Placement Date/Time: 12/19/23 1300   Type: (c) With balloon   Number of days: 6         Scheduled medications  apixaban, 2.5 mg, oral, BID  cholestyramine, 4 g, oral, Daily with evening meal  collagenase, , Topical, Daily  doxycycline, 100 mg, oral, Daily  epoetin di or biosimilar, 10,000 Units, intravenous, Every Mon/Wed/Fri  escitalopram, 10 mg, oral, Daily  febuxostat, 40 mg, oral, Every other day  fenofibrate, 160 mg, oral, Daily  gabapentin, 100 mg, oral, BID  heparin, 2,000 Units, intra-catheter, After Dialysis  heparin, 2,000 Units, intra-catheter, After Dialysis  heparin, 2,000 Units, intra-catheter, After Dialysis  heparin, 2,000 Units, intra-catheter, After Dialysis  insulin lispro, 0-5 Units, subcutaneous, TID with meals  ipratropium-albuteroL, 3 mL, nebulization, TID  [Held by provider] metoprolol succinate XL, 12.5 mg, oral, Daily  midodrine, 15 mg, oral, TID with meals  nystatin, 1 Application, Topical, BID  simvastatin, 40 mg, oral, Nightly  vancomycin, 125 mg, oral, 4x daily      Continuous medications  norepinephrine, 0.01-3 mcg/kg/min, Last Rate: 0.02 mcg/kg/min (12/28/23 0613)      PRN medications  PRN medications: acetaminophen, acetaminophen, dextrose 10 % in water (D10W), dextrose, glucagon, HYDROcodone-acetaminophen, oxygen, polyethylene glycol, zinc oxide    Relevant Results  Results from last 7 days   Lab Units 12/28/23  0405 12/27/23 0522 12/26/23 0439   WBC AUTO x10*3/uL 11.3 14.1* 13.4*   HEMOGLOBIN g/dL 9.2* 10.0* 9.3*   HEMATOCRIT % 26.8* 30.0* 27.0*   PLATELETS AUTO x10*3/uL 226 215 221        Results from last 7 days   Lab Units 12/28/23  0405 12/27/23 0522 12/26/23 0439   SODIUM mmol/L 130* 135 132*   POTASSIUM mmol/L 5.0 4.0 3.7   CHLORIDE mmol/L 96* 99 98    CO2 mmol/L 23* 26 24   BUN mg/dL 18 10 15   CREATININE mg/dL 3.10* 2.10* 3.10*   GLUCOSE mg/dL 159* 185* 138*   CALCIUM mg/dL 8.3* 8.4* 8.1*            XR chest 1 view 12/19/2023    Narrative  Interpreted By:  Judd Lee,  STUDY:  XR CHEST 1 VIEW; 12/19/2023 5:44 am    INDICATION:  Signs/Symptoms:pleural effusion.    COMPARISON:  December 14, 2023    ACCESSION NUMBER(S):  [id number]    ORDERING CLINICIAN:  PARIS LUCAS    FINDINGS:  RESULT: Right sided central venous catheter is again noted extending  into the right atrium. The distal tip is not well visualized.  Left-sided vascular stents are noted in the left subclavian region.  Left axillary surgical clips are noted. The cardiac silhouette is  within normal limits for size. Mediastinal contours are unremarkable.  Left basilar opacity obscures the left hemidiaphragm with hazy  opacity throughout the left lung. There are degenerative changes of  the thoracic spine and shoulders.    Impression  Persistent left basilar opacity obscures the left hemidiaphragm with  hazy opacity extending through the left hemithorax. Findings likely  represent pleural effusion atelectasis. Underlying infiltrate or mass  is not excluded. Overall findings not significantly changed compared  to previous exam      Signed by: Judd Lee 12/19/2023 9:02 AM  Dictation workstation:   [id number]      Patient Active Problem List   Diagnosis    Amputation of finger, except thumb    Tenosynovitis    Inflammatory dermatosis    Skin abnormality    Dry skin    Arthritis    Syncope    Swelling of finger    Sinusitis, acute    Bronchitis, acute    Second degree burn of single finger of left hand, not thumb    Rash    Postmenopausal bleeding    Pneumonia    Infection    Phlegmon    Paroxysmal atrial fibrillation (CMS/HCC)    Right arm pain    Pain in right shoulder    Skin changes due to chronic exposure to nonionizing radiation, unspecified    Other seborrheic keratosis    Inflamed  seborrheic keratosis    Actinic keratosis    Open wound of finger    Obstructive sleep apnea    Obesity with body mass index 30 or greater    Nuclear senile cataract    Nonhealing surgical wound    Neoplasm of uncertain behavior of skin    Miosis    Mild nonproliferative diabetic retinopathy (CMS/HCC)    Lactic acidosis    Injury of head    Infection due to Escherichia coli    Hyponatremia    Peripheral vascular disease (CMS/HCC)    Orthostatic hypotension    Low blood pressure    Hypertension    History of breast cancer in female    Personal history of other malignant neoplasm of skin    High cholesterol    Mixed hyperlipidemia    Heart failure (CMS/HCC)    Headache    Growing pains    Gout    Chronic gout with tophus    Gastrointestinal hemorrhage    Fecal incontinence    Fatigue    Fall    Facial basal cell cancer    End-stage renal disease on hemodialysis (CMS/HCC)    Anemia of chronic renal failure    End stage renal disease (CMS/HCC)    Difficulty walking    Dyslipidemia    Diabetic foot ulcer (CMS/HCC)    Dermatochalasis of right upper eyelid    Dermatochalasis of left upper eyelid    Mixed anxiety and depressive disorder    Depression    Dependence on renal dialysis (CMS/HCC)    Contusion of face    Clouded consciousness    Chronic pain of both knees    Chronic osteomyelitis (CMS/HCC)    Chronic kidney disease, stage 3 (CMS/HCC)    Chronic kidney disease (CKD), stage IV (severe) (CMS/HCC)    Chorioretinal scar    Cerebrovascular accident (CMS/HCC)    Breast cancer (CMS/HCC)    Arthritis of knee, right    Arthritis of knee, left    Anemia of renal disease    Altered mental status    Colon cancer screening    Cellulitis of finger    Loss of consciousness (CMS/HCC)    Tenosynovitis of fingers    Thyroid nodule    Tinea corporis    Toxic metabolic encephalopathy    DM (diabetes mellitus) (CMS/HCC)    Hypoglycemia    Type 2 diabetes mellitus (CMS/HCC)    Visual hallucinations    Vocal cord paralysis    Wound  infection    Hypotension, unspecified hypotension type    Absent pedal pulses     Assessment/Plan   Septic shock  COVID-19 infection  C. difficile colitis  End-stage renal disease  Possible endocarditis  Transudative pleural effusion      Remains on pressors.  Dialysis per renal.  Oral doxycycline for history of MRSA.  Continue Dificid  Wound care  Minimum systolic pressure of 70 acceptable per nephrology.  Continue to wean off Levophed  Continue midodrine  Wean oxygen as tolerated      Matthew Engle MD  [facility]       This critically ill patient continues to be at-risk for deterioration / failure due to the above mentioned dysfunctional unstable organ systems.   Critical care time is spent at bedside includes review of diagnostic tests, labs, and radiographs, serial assessments and management of hemodynamics, respiratory status, and coordination of care with different members of interdisciplinary team  Assessment, impression and plans are reflected in the note above as well as the orders.     Critical concerns addressed:     Time Spent in critical Care, exclusive of procedures : mins.     Disclaimer: Parts of this chart were dictated with voice recognition software. Please excuse any errors of grammar, spelling, or transcription which are not corrected. Please contact me with any questions regarding documentation.

## 2023-12-28 NOTE — CARE PLAN
The patient's goals for the shift include rest    The clinical goals for the shift include wean off pressors    Over the shift, the patient did not make progress toward the following goals unable to wean from pressors.

## 2023-12-28 NOTE — PROGRESS NOTES
Ayanna Gross is a 73 y.o. female on day 22 of admission presenting with Hypotension, unspecified hypotension type.      Subjective      Patient remains on low-dose of pressor.    She denied chest pain or shortness of breath.  Patient has been afebrile during the night.    Objective     Last Recorded Vitals  BP (!) 92/49   Pulse 99   Temp 36.7 °C (98.1 °F) (Oral)   Resp 21   Wt 67.2 kg (148 lb 2.4 oz)   SpO2 100%   Intake/Output last 3 Shifts:    Intake/Output Summary (Last 24 hours) at 12/28/2023 0806  Last data filed at 12/27/2023 1727  Gross per 24 hour   Intake 629.9 ml   Output --   Net 629.9 ml         Admission Weight  Weight: 72.6 kg (160 lb) (12/06/23 1301)    Daily Weight  12/28/23 : 67.2 kg (148 lb 2.4 oz)    Image Results      Physical Exam  General: Pleasant, cooperating during physical exam, on pressors.  HEENT: Pupils are equal and reactive to light and commendation , oral mucosa moist, no JVD .  Cardiovascular: Normal sinus rhythm, no MRG.  Lungs: Clear to auscultation bilaterally, no wheezing, no crackles, no dullness to percussion.  Abdomen: No hepatosplenomegaly appreciated, soft , not tender, positive bowel sounds, positive bowel movement.  Neuro: Alert and oriented x2, strength in upper and lower extremities , sensation intact.  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 difficile colitis.  History of chronic MRSA bacteremia, infective endocarditis.  Continue with doxycycline.  Patient completed full course of IV vancomycin  Continue gently with pressors  Patient remains on low-dose of pressors.    On her  baseline patient has low blood pressure.  Dr. Mason  on the case    End stage renal disease   On dialysis  Dr. Loaiza on the case.    Hypertension  Continue with midodrine and low-dose of pressors.    C. difficile colitis  ID on the case  Patient completed full course of fidaxomicin   started on p.o. vancomycin  Gout    Anemia of chronic disease  Positive for cold agglutinins  Patient was advised to follow-up with her primary care physician.  Consult Dr. Leonard from hematology oncology services    Pleural effusion  Status post thoracocentesis  Patient was found to have transudative pleural effusion  Pulmonary on the case    COVID-19 infection  Patient is not hypoxic.  Continue with contact isolation and droplet precaution.    Peripheral vascular disease   heel ulcers  Evaluated by podiatry  Follow-up with Jimenez Landry as outpatient     encephalopathy.  Clinically improved.    Patient is at high risk for deterioration-titrate arrhythmias.    Diabetes mellitus type II.  cover with insulin sliding scale    Discussed in detail with at bedside.  Monitor close patient is on low-dose of pressors.  With midodrine  Waiting for consult to see her today.  Continue with contact isolation droplet precaution.    Principal Problem:    Hypotension, unspecified hypotension type  Active Problems:    Paroxysmal atrial fibrillation (CMS/HCC)    Peripheral vascular disease (CMS/HCC)    Orthostatic hypotension    Mixed hyperlipidemia    End-stage renal disease on hemodialysis (CMS/HCC)    DM (diabetes mellitus) (CMS/HCC)    Absent pedal pulses                  Veronica Yepez MD

## 2023-12-28 NOTE — PROGRESS NOTES
Physical Therapy    Physical Therapy Evaluation & Treatment    Patient Name: Ayanna Gross  MRN: [id number]  Today's Date: 12/28/2023   Time Calculation  Start Time: 1112  Stop Time: 1132  Time Calculation (min): 20 min    Assessment/Plan   PT Assessment  PT Assessment Results: Decreased strength, Decreased range of motion, Decreased endurance, Impaired balance, Decreased mobility, Decreased coordination, Decreased safety awareness, Impaired judgement, Impaired sensation, Decreased skin integrity  Rehab Prognosis: Fair  Barriers to Discharge: medical status  Evaluation/Treatment Tolerance: Treatment limited secondary to medical complications (Comment) (hypotensive despite pressors)  Medical Staff Made Aware: Yes  Strengths: Rehab experience  Barriers to Participation: Ability to acquire knowledge, Comorbidities  End of Session Communication: Bedside nurse  Assessment Comment: pt with impaired functional mobility, increased B LE weakness, and poor safety awareness. pt is confused and fatigues easily.  End of Session Patient Position: Bed, 3 rail up, Alarm off, not on at start of session (needs in reach)   IP OR SWING BED PT PLAN  Inpatient or Swing Bed: Inpatient  PT Plan  Treatment/Interventions: Bed mobility, Transfer training, Balance training, Strengthening, Endurance training, Range of motion, Therapeutic exercise, Therapeutic activity  PT Plan: Skilled PT  PT Frequency: 2 times per week  PT Discharge Recommendations: Moderate intensity level of continued care  Equipment Recommended upon Discharge: Wheelchair  PT Recommended Transfer Status: Total assist, Assist x2  PT - OK to Discharge: Yes    Subjective     General Visit Information:  General  Reason for Referral: RE-EVAL for Impaired Mobility  Referred By: Veronica Yepez MD  Past Medical History Relevant to Rehab: PAF, depression, anxiety, GIB, gout, skin CA, Breast CA, CVA, chronic pain, STARR, OA, OH, PVD, DM, ESRD on HD, B heel ulcers, sacral  ulcer  Family/Caregiver Present: No  Co-Treatment: OT  Co-Treatment Reason: medically complex re-evaluation in ICU, patient requiring 2 skilled persons for mobility and caregiver/patient safety  Prior to Session Communication: Bedside nurse  Patient Position Received: Bed, 2 rail up, Alarm off, not on at start of session  Preferred Learning Style: verbal  General Comment: pt cleared for re-evaluation for therapy, pt supine in bed, R LE off the bed, unable to lift back into bed. pt agreeable to therapy.  Home Living:  Home Living  Type of Home: House  Lives With: Significant other  Home Adaptive Equipment: Wheelchair-manual, Walker rolling or standard, Other (Comment)  Home Layout: One level, Able to live on main level with bedroom/bathroom  Home Access: Ramped entrance  Bathroom Shower/Tub: Walk-in shower  Bathroom Toilet: Handicapped height  Bathroom Equipment: Grab bars in shower, Shower chair with back  Prior Level of Function:  Prior Function Per Pt/Caregiver Report  Level of [place]: Needs assistance with ADLs, Needs assistance with homemaking  Receives Help From: Family  ADL Assistance: Needs assistance  Homemaking Assistance: Needs assistance  Driving/Transportation: Public transportation  Shopping:  ( completes)  Ambulatory Assistance: Needs assistance  Transfers: Independent, Assistive device (wheelchair)  Gait: Assistive device (wheelchair)  Prior Function Comments: patient reports that she is independence with ADLs, except her daughters come to assist with jeaneth hygiene tasks.  Precautions:  Precautions  Hearing/Visual Limitations: wears glasses  Medical Precautions: Fall precautions, Oxygen therapy device and L/min, Infection precautions (2L O2 via NC)  Precautions Comment: COVID+, Cdiff+  Vital Signs:  Vital Signs  Heart Rate: 99  Heart Rate Source: Monitor  Resp: 18  SpO2: 90 %  BP: (!) 88/44  MAP (mmHg): 58  BP Location: Right leg  BP Method: Arterial line  Patient Position:  Lying    Objective   Pain:  Pain Assessment  Pain Assessment: 0-10  Pain Score: 0 - No pain  Cognition:  Cognition  Overall Cognitive Status: Impaired  Orientation Level: Oriented X4  Cognition Comments: pt is oriented yet seems confused, making odd comments    General Assessments:  General Observation  General Observation: pt with multiple areas of decreased skin integrity/wounds, recent pictures have been taken by the RN. pt with multiple lines/wires/medical equipment    Activity Tolerance  Endurance: Decreased tolerance for upright activites  Activity Tolerance Comments: fair-    Sensation  Sensation Comment: numbness in feet and hands    Strength  Strength Comments: B LE strength appears equal bilaterally, 3-/5  Coordination  Coordination Comment: decreased accuracy and rate of movement    Postural Control  Posture Comment: NA, pt remained supine in bed due to hypotension    Static Sitting Balance  Static Sitting-Comment/Number of Minutes: NA    Static Standing Balance  Static Standing-Comment/Number of Minutes: NA  Functional Assessments:  ADL  ADL's Addressed: No    Bed Mobility  Bed Mobility:  (log rolling side to side multiple times with mod A to complete rolling.  verbal cues and assist to flex knees and guide UEs across body to the bedrails.)    Transfers  Transfer: No    Ambulation/Gait Training  Ambulation/Gait Training Performed: No    Stairs  Stairs: No  Extremity/Trunk Assessments:  RLE   RLE :  (decreased ROM at end ranges, weakness)  LLE   LLE :  (decreased ROM at end ranges, weakness.)  Treatments:  Therapeutic Activity  Therapeutic Activity Performed:  (verbal cues and manual guidance of UEs/LEs during bed mobility and multiple episodes of rolling. dependent hygiene performed. pt boosted to HOB with max A x 2 using the draw sheet. wedges placed under left side for pressure relief with waffle mattress.)  Outcome Measures:  [facility] Basic Mobility  Turning from your back to your side while in a flat  bed without using bedrails: A lot  Moving from lying on your back to sitting on the side of a flat bed without using bedrails: A lot  Moving to and from bed to chair (including a wheelchair): Total  Standing up from a chair using your arms (e.g. wheelchair or bedside chair): Total  To walk in hospital room: Total  Climbing 3-5 steps with railing: Total  Basic Mobility - Total Score: 8    Encounter Problems       Encounter Problems (Active)       Balance       STG - Maintains static sitting balance with B upper extremity support on the edge of the bed x 15 minutes  (Not Progressing)       Start:  12/07/23    Expected End:  01/28/24       INTERVENTIONS:  1. Practice sitting on the edge of a bed/mat with minimal support.  2. Educate patient about pressure relief.              Pain - Adult          Transfers       STG - Patient to transfer to and from sit to supine with mod A via log rolling for skin protection. (Not Progressing)       Start:  12/07/23    Expected End:  01/28/24            STG - Patient will perform bed mobility with min A to maintain pressure relief and prevent further skin integrity issues. (Progressing)       Start:  12/07/23    Expected End:  01/28/24            STG - Patient will roll from side to side using the bed rail to assist with hygiene and pressure relief with min A. (Progressing)       Start:  12/07/23    Expected End:  01/28/24                   Education Documentation  No documentation found.  Education Comments  No comments found.

## 2023-12-28 NOTE — PROGRESS NOTES
CONSULT PROGRESS NOTES    SERVICE DATE: 12/28/2023   SERVICE TIME: 1:06 PM    CONSULTING SERVICE: Nephrology    ASSESSMENT AND PLAN   73-year-old with numerous medical problems including end-stage renal disease admitted for hypotension, C. difficile colitis, recurrent pleural effusion, and COVID 19.  1.  End-stage renal disease  2.  Hypotension  3.  Fluid overload  4.  Anemia of chronic renal disease     I am obliging a thrice weekly hemodialysis schedule.  She remains on a low-dose of norepinephrine and maximum dose midodrine.  Continue to wean the pressors for a systolic blood pressure above 70, as she is not uncommonly in the 70 systolic on outpatient dialysis.  Will again attempt this today.  There is no role for hemodialysis today.  Anticipate hemodialysis will be needed on Friday given that she is off schedule this week and had treatment Tuesday.  I am not inclined to use CRRT.  She seems to be mentating about her baseline.     Continue midodrine, continue low temperature dialysat during treatments.  Minimal ultrafiltration.  Continue supportive care.    SUBJECTIVE  INTERVAL HPI: She has no new issues, no cough, no nausea, no vomiting, no abdominal pain.  Blood pressure remains low and she is on a very low-dose of pressors.    MEDICATIONS:  apixaban, 2.5 mg, oral, BID  cholestyramine, 4 g, oral, Daily with evening meal  collagenase, , Topical, Daily  doxycycline, 100 mg, oral, Daily  epoetin di or biosimilar, 10,000 Units, intravenous, Every Mon/Wed/Fri  escitalopram, 10 mg, oral, Daily  febuxostat, 40 mg, oral, Every other day  fenofibrate, 160 mg, oral, Daily  gabapentin, 100 mg, oral, BID  heparin, 2,000 Units, intra-catheter, After Dialysis  heparin, 2,000 Units, intra-catheter, After Dialysis  heparin, 2,000 Units, intra-catheter, After Dialysis  heparin, 2,000 Units, intra-catheter, After Dialysis  insulin lispro, 0-5 Units, subcutaneous, TID with meals  ipratropium-albuteroL, 3 mL, nebulization,  TID  [Held by provider] metoprolol succinate XL, 12.5 mg, oral, Daily  midodrine, 15 mg, oral, TID with meals  nystatin, 1 Application, Topical, BID  simvastatin, 40 mg, oral, Nightly  vancomycin, 125 mg, oral, 4x daily       norepinephrine, 0.01-3 mcg/kg/min, Last Rate: Stopped (12/28/23 1243)       PRN medications: acetaminophen, acetaminophen, dextrose 10 % in water (D10W), dextrose, glucagon, HYDROcodone-acetaminophen, oxygen, polyethylene glycol, zinc oxide     OBJECTIVE  PHYSICAL EXAM:   Heart Rate:  []   Temp:  [36.6 °C (97.9 °F)-37.2 °C (98.9 °F)]   Resp:  [16-23]   BP: (67-92)/(30-49)   Weight:  [67.2 kg (148 lb 2.4 oz)]   SpO2:  [88 %-100 %]   Body mass index is 27.09 kg/m².  This is a chronically ill-appearing mildly toxic obese white woman  Very pale skin  Hearing intact  Phonation intact  dry oral mucosa  Regular heart rate  Unlabored breathing  Abdomen is soft, nondistended, nontender, positive bowel sounds  No Amos catheter in place, no suprapubic tenderness to palpation  There is no significant lower extremity edema, left index finger amputation  Moves 4 limbs spontaneously  No obvious joint deformities  No lymphadenopathy  Right internal jugular tunneled hemodialysis catheter  She has failed fistula in her left upper extremity  Rectal tube in place       DATA:   Labs:  Results for orders placed or performed during the hospital encounter of 12/06/23 (from the past 96 hour(s))   POCT GLUCOSE   Result Value Ref Range    POCT Glucose 154 (H) 74 - 99 mg/dL   POCT GLUCOSE   Result Value Ref Range    POCT Glucose 175 (H) 74 - 99 mg/dL   Renal Function Panel   Result Value Ref Range    Glucose 162 (H) 65 - 99 mg/dL    Sodium 135 133 - 145 mmol/L    Potassium 3.7 3.4 - 5.1 mmol/L    Chloride 98 97 - 107 mmol/L    Bicarbonate 27 24 - 31 mmol/L    Urea Nitrogen 8 8 - 25 mg/dL    Creatinine 2.00 (H) 0.40 - 1.60 mg/dL    eGFR 26 (L) >60 mL/min/1.73m*2    Calcium 8.2 (L) 8.5 - 10.4 mg/dL    Phosphorus 1.7  (L) 2.5 - 4.5 mg/dL    Albumin 2.8 (L) 3.5 - 5.0 g/dL    Anion Gap 10 <=19 mmol/L   CBC and Auto Differential   Result Value Ref Range    WBC 14.0 (H) 4.4 - 11.3 x10*3/uL    nRBC 0.1 (H) 0.0 - 0.0 /100 WBCs    RBC 3.37 (L) 4.00 - 5.20 x10*6/uL    Hemoglobin 9.6 (L) 12.0 - 16.0 g/dL    Hematocrit 29.5 (L) 36.0 - 46.0 %    MCV 88 80 - 100 fL    MCH 28.5 26.0 - 34.0 pg    MCHC 32.5 32.0 - 36.0 g/dL    RDW 23.0 (H) 11.5 - 14.5 %    Platelets 201 150 - 450 x10*3/uL    Neutrophils % 68.7 40.0 - 80.0 %    Immature Granulocytes %, Automated 0.9 0.0 - 0.9 %    Lymphocytes % 16.6 13.0 - 44.0 %    Monocytes % 8.7 2.0 - 10.0 %    Eosinophils % 4.1 0.0 - 6.0 %    Basophils % 1.0 0.0 - 2.0 %    Neutrophils Absolute 9.63 (H) 1.60 - 5.50 x10*3/uL    Immature Granulocytes Absolute, Automated 0.12 0.00 - 0.50 x10*3/uL    Lymphocytes Absolute 2.33 0.80 - 3.00 x10*3/uL    Monocytes Absolute 1.22 (H) 0.05 - 0.80 x10*3/uL    Eosinophils Absolute 0.57 (H) 0.00 - 0.40 x10*3/uL    Basophils Absolute 0.14 (H) 0.00 - 0.10 x10*3/uL   Procalcitonin   Result Value Ref Range    Procalcitonin 1.33 (H) <=0.07 ng/mL   C-Reactive Protein   Result Value Ref Range    C-Reactive Protein 5.20 (H) 0.00 - 2.00 mg/dL   Magnesium   Result Value Ref Range    Magnesium 1.60 1.60 - 3.10 mg/dL   Morphology   Result Value Ref Range    RBC Morphology See Below     Polychromasia Mild     Target Cells Few    POCT GLUCOSE   Result Value Ref Range    POCT Glucose 134 (H) 74 - 99 mg/dL   POCT GLUCOSE   Result Value Ref Range    POCT Glucose 131 (H) 74 - 99 mg/dL   POCT GLUCOSE   Result Value Ref Range    POCT Glucose 174 (H) 74 - 99 mg/dL   POCT GLUCOSE   Result Value Ref Range    POCT Glucose 150 (H) 74 - 99 mg/dL   CBC and Auto Differential   Result Value Ref Range    WBC 13.4 (H) 4.4 - 11.3 x10*3/uL    nRBC 0.0 0.0 - 0.0 /100 WBCs    RBC 3.23 (L) 4.00 - 5.20 x10*6/uL    Hemoglobin 9.3 (L) 12.0 - 16.0 g/dL    Hematocrit 27.0 (L) 36.0 - 46.0 %    MCV 84 80 - 100 fL     MCH 28.8 26.0 - 34.0 pg    MCHC 34.4 32.0 - 36.0 g/dL    RDW 23.2 (H) 11.5 - 14.5 %    Platelets 221 150 - 450 x10*3/uL    Neutrophils % 65.9 40.0 - 80.0 %    Immature Granulocytes %, Automated 0.7 0.0 - 0.9 %    Lymphocytes % 18.7 13.0 - 44.0 %    Monocytes % 8.9 2.0 - 10.0 %    Eosinophils % 4.8 0.0 - 6.0 %    Basophils % 1.0 0.0 - 2.0 %    Neutrophils Absolute 8.87 (H) 1.60 - 5.50 x10*3/uL    Immature Granulocytes Absolute, Automated 0.09 0.00 - 0.50 x10*3/uL    Lymphocytes Absolute 2.51 0.80 - 3.00 x10*3/uL    Monocytes Absolute 1.19 (H) 0.05 - 0.80 x10*3/uL    Eosinophils Absolute 0.65 (H) 0.00 - 0.40 x10*3/uL    Basophils Absolute 0.13 (H) 0.00 - 0.10 x10*3/uL   Comprehensive metabolic panel   Result Value Ref Range    Glucose 138 (H) 65 - 99 mg/dL    Sodium 132 (L) 133 - 145 mmol/L    Potassium 3.7 3.4 - 5.1 mmol/L    Chloride 98 97 - 107 mmol/L    Bicarbonate 24 24 - 31 mmol/L    Urea Nitrogen 15 8 - 25 mg/dL    Creatinine 3.10 (H) 0.40 - 1.60 mg/dL    eGFR 16 (L) >60 mL/min/1.73m*2    Calcium 8.1 (L) 8.5 - 10.4 mg/dL    Albumin 2.5 (L) 3.5 - 5.0 g/dL    Alkaline Phosphatase 108 35 - 125 U/L    Total Protein 5.5 (L) 5.9 - 7.9 g/dL    AST 14 5 - 40 U/L    Bilirubin, Total 1.1 0.1 - 1.2 mg/dL    ALT 7 5 - 40 U/L    Anion Gap 10 <=19 mmol/L   Phosphorus   Result Value Ref Range    Phosphorus 3.0 2.5 - 4.5 mg/dL   Morphology   Result Value Ref Range    RBC Morphology See Below     Polychromasia Mild     Target Cells Many     Basophilic Stippling Present     Clumped Platelets Present    POCT GLUCOSE   Result Value Ref Range    POCT Glucose 136 (H) 74 - 99 mg/dL   POCT GLUCOSE   Result Value Ref Range    POCT Glucose 191 (H) 74 - 99 mg/dL   POCT GLUCOSE   Result Value Ref Range    POCT Glucose 154 (H) 74 - 99 mg/dL   POCT GLUCOSE   Result Value Ref Range    POCT Glucose 153 (H) 74 - 99 mg/dL   Renal Function Panel   Result Value Ref Range    Glucose 185 (H) 65 - 99 mg/dL    Sodium 135 133 - 145 mmol/L    Potassium  4.0 3.4 - 5.1 mmol/L    Chloride 99 97 - 107 mmol/L    Bicarbonate 26 24 - 31 mmol/L    Urea Nitrogen 10 8 - 25 mg/dL    Creatinine 2.10 (H) 0.40 - 1.60 mg/dL    eGFR 25 (L) >60 mL/min/1.73m*2    Calcium 8.4 (L) 8.5 - 10.4 mg/dL    Phosphorus 1.7 (L) 2.5 - 4.5 mg/dL    Albumin 2.6 (L) 3.5 - 5.0 g/dL    Anion Gap 10 <=19 mmol/L   CBC and Auto Differential   Result Value Ref Range    WBC 14.1 (H) 4.4 - 11.3 x10*3/uL    nRBC 0.0 0.0 - 0.0 /100 WBCs    RBC 3.54 (L) 4.00 - 5.20 x10*6/uL    Hemoglobin 10.0 (L) 12.0 - 16.0 g/dL    Hematocrit 30.0 (L) 36.0 - 46.0 %    MCV 85 80 - 100 fL    MCH 28.2 26.0 - 34.0 pg    MCHC 33.3 32.0 - 36.0 g/dL    RDW 23.4 (H) 11.5 - 14.5 %    Platelets 215 150 - 450 x10*3/uL    Neutrophils % 66.2 40.0 - 80.0 %    Immature Granulocytes %, Automated 0.7 0.0 - 0.9 %    Lymphocytes % 18.5 13.0 - 44.0 %    Monocytes % 10.1 2.0 - 10.0 %    Eosinophils % 3.6 0.0 - 6.0 %    Basophils % 0.9 0.0 - 2.0 %    Neutrophils Absolute 9.33 (H) 1.60 - 5.50 x10*3/uL    Immature Granulocytes Absolute, Automated 0.10 0.00 - 0.50 x10*3/uL    Lymphocytes Absolute 2.60 0.80 - 3.00 x10*3/uL    Monocytes Absolute 1.42 (H) 0.05 - 0.80 x10*3/uL    Eosinophils Absolute 0.50 (H) 0.00 - 0.40 x10*3/uL    Basophils Absolute 0.12 (H) 0.00 - 0.10 x10*3/uL   Morphology   Result Value Ref Range    RBC Morphology See Below     Polychromasia Mild     Target Cells Many     Basophilic Stippling Present     Clumped Platelets Present    POCT GLUCOSE   Result Value Ref Range    POCT Glucose 175 (H) 74 - 99 mg/dL   POCT GLUCOSE   Result Value Ref Range    POCT Glucose 131 (H) 74 - 99 mg/dL   POCT GLUCOSE   Result Value Ref Range    POCT Glucose 242 (H) 74 - 99 mg/dL   POCT GLUCOSE   Result Value Ref Range    POCT Glucose 107 (H) 74 - 99 mg/dL   Renal Function Panel   Result Value Ref Range    Glucose 159 (H) 65 - 99 mg/dL    Sodium 130 (L) 133 - 145 mmol/L    Potassium 5.0 3.4 - 5.1 mmol/L    Chloride 96 (L) 97 - 107 mmol/L     Bicarbonate 23 (L) 24 - 31 mmol/L    Urea Nitrogen 18 8 - 25 mg/dL    Creatinine 3.10 (H) 0.40 - 1.60 mg/dL    eGFR 16 (L) >60 mL/min/1.73m*2    Calcium 8.3 (L) 8.5 - 10.4 mg/dL    Phosphorus 2.4 (L) 2.5 - 4.5 mg/dL    Albumin 2.5 (L) 3.5 - 5.0 g/dL    Anion Gap 11 <=19 mmol/L   CBC and Auto Differential   Result Value Ref Range    WBC 11.3 4.4 - 11.3 x10*3/uL    nRBC 0.0 0.0 - 0.0 /100 WBCs    RBC 3.19 (L) 4.00 - 5.20 x10*6/uL    Hemoglobin 9.2 (L) 12.0 - 16.0 g/dL    Hematocrit 26.8 (L) 36.0 - 46.0 %    MCV 84 80 - 100 fL    MCH 28.8 26.0 - 34.0 pg    MCHC 34.3 32.0 - 36.0 g/dL    RDW 23.7 (H) 11.5 - 14.5 %    Platelets 226 150 - 450 x10*3/uL    Neutrophils % 54.6 40.0 - 80.0 %    Immature Granulocytes %, Automated 0.7 0.0 - 0.9 %    Lymphocytes % 26.7 13.0 - 44.0 %    Monocytes % 12.2 2.0 - 10.0 %    Eosinophils % 4.7 0.0 - 6.0 %    Basophils % 1.1 0.0 - 2.0 %    Neutrophils Absolute 6.19 (H) 1.60 - 5.50 x10*3/uL    Immature Granulocytes Absolute, Automated 0.08 0.00 - 0.50 x10*3/uL    Lymphocytes Absolute 3.03 (H) 0.80 - 3.00 x10*3/uL    Monocytes Absolute 1.38 (H) 0.05 - 0.80 x10*3/uL    Eosinophils Absolute 0.53 (H) 0.00 - 0.40 x10*3/uL    Basophils Absolute 0.13 (H) 0.00 - 0.10 x10*3/uL   Morphology   Result Value Ref Range    RBC Morphology See Below     Polychromasia Mild     Target Cells Many     Basophilic Stippling Present     Giant Platelets Few     Clumped Platelets Present    POCT GLUCOSE   Result Value Ref Range    POCT Glucose 140 (H) 74 - 99 mg/dL   POCT GLUCOSE   Result Value Ref Range    POCT Glucose 165 (H) 74 - 99 mg/dL         SIGNATURE: Saeed Mondragon MD PATIENT NAME: Ayanna Gross   DATE: December 28, 2023 MRN: [id number]   TIME: 1:06 PM PAGER: [phone]

## 2023-12-28 NOTE — NURSING NOTE
Assumed care of patient.  She is alert and awake.  Her BP is still low.  She is on Levophed.  She was given her meds and breakfast.

## 2023-12-28 NOTE — CONSULTS
Reason For Consult  Cold Agglutinin disease    History Of Present Illness  Ayanna Gross is a 73 y.o. female presenting with possible, acute renal disease.    Patient was admitted to [facility] ICU and she have end-stage renal disease on dialysis.  During one of the CBC done in the lab, call agglutinin apparently was admitted identified hands hematology consult was requested.  Review of his CBC suggest that patient's hemoglobin is rather stable and he just 90 she has a hemolytic anemia at this time.     Past Medical History  She has a past medical history of Asthma, CAD (coronary artery disease), CHF (congestive heart failure) (CMS/[facility]), Chronic kidney disease on chronic dialysis (CMS/[facility]), Hypertension, Personal history of diseases of the blood and blood-forming organs and certain disorders involving the immune mechanism, Sleep apnea, and Type 2 diabetes mellitus without complications (CMS/[facility]) (09/15/2022).    Surgical History  She has a past surgical history that includes Other surgical history (11/18/2013); Carpal tunnel release (11/18/2013); Umbilical hernia repair (11/18/2013); Tonsillectomy (11/18/2013); Hand surgery (11/18/2013); Other surgical history (11/18/2013); Other surgical history (11/18/2013); Other surgical history (04/04/2022); Other surgical history (04/04/2022); Other surgical history (04/04/2022); Mastectomy, partial (04/10/2014); [place] lymph node biopsy (04/10/2014); US guided percutaneous placement (6/29/2023); and MR angio head wo IV contrast (8/29/2023).     Social History  She reports that she has never smoked. She has never used smokeless tobacco. She reports that she does not currently use alcohol. She reports that she does not use drugs.    Family History  Family History   Problem Relation Name Age of Onset    Lymphoma Mother      Prostate cancer Father          passed away fabiana min 2017        Allergies  Patient has no known allergies.    Review of Systems      "  Physical Exam  Well-developed well-nourished white woman pleasant and cooperative  HEENT: Unremarkable  Lungs are clear  Heart exam is benign with S1 and S2   Neurological exam is  nonfocal  Extremity: Right foot toes amputated       Last Recorded Vitals  Blood pressure (!) 88/44, pulse 97, temperature 36.7 °C (98.1 °F), temperature source Temporal, resp. rate 18, height 1.575 m (5' 2.01\"), weight 67.2 kg (148 lb 2.4 oz), SpO2 97 %.    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  Today is primary and secondary: Critical disease.  I will recommend patient to be follow-up by her primary care physician after discharges for further evaluation.    I spent 30 minutes in the professional and overall care of this patient.      Kasia Leonard MD    "

## 2023-12-29 NOTE — PROGRESS NOTES
Hemodialysis procedure note:  I saw her during her hemodialysis procedure.  She was tolerating the procedure without remark.  We are attempting 1 L volume removal with low-temperature dialysate.  Unfortunately, she remains on a very low-dose of norepinephrine.  Cannot seem to wean her from pressors.  She looks well otherwise, feels reasonably well, but her inability to be liberated from pressors makes disposition challenging.  Empirically begin Solu-Cortef 100 mg IV every 8 hours for trial.  I do not think she has adrenal insufficiency, but at this point it is worth an attempt.  Anticipate next hemodialysis would be on Sunday.  Dr. Andrade is available tomorrow.

## 2023-12-29 NOTE — PROGRESS NOTES
"Ayanna Gross is a 73 y.o. female on day 23 of admission presenting with Hypotension, unspecified hypotension type.    Hematology consult was called because of COVID continue as noted in the CBC results.    Subjective   Status quo   Hemoglobin drops a little today based on the level.  However, her haptoglobin and LDH levels are normal.  Reticulocyte count is marginally elevated.  I do not believe she have active hemolytic anemia however.    Objective     Physical Exam    Last Recorded Vitals  Blood pressure (!) 88/44, pulse 102, temperature 36.3 °C (97.3 °F), temperature source Temporal, resp. rate 24, height 1.575 m (5' 2.01\"), weight 67.2 kg (148 lb 2.4 oz), SpO2 98 %.  Intake/Output last 3 Shifts:  No intake/output data recorded.    Relevant Results              This patient has a central line   Reason for the central line remaining today?                  Assessment/Plan   Principal Problem:    Hypotension, unspecified hypotension type  Active Problems:    Paroxysmal atrial fibrillation (CMS/HCC)    Peripheral vascular disease (CMS/HCC)    Orthostatic hypotension    Mixed hyperlipidemia    End-stage renal disease on hemodialysis (CMS/HCC)    DM (diabetes mellitus) (CMS/HCC)    Absent pedal pulses    Cold agglutinin disease     I do not believe she has active COVID) rendering her anemic by hemolysis.  We will OBSERVE her for now.     I spent 22 minutes in the professional and overall care of this patient.      Kasia Leonard MD      "

## 2023-12-29 NOTE — PROGRESS NOTES
Patient not medically clear. Patient remains in the ICU. Patient unable to wean off pressors. [facility] sent referral to [facility] to see if patient will qualify. Waiting on response. [facility], [facility] updated them regarding the pressors. Patient will need a precert regardless of facility. Will follow.     **PATIENT DOES NOT HAVE A SAFE DISCHARGE PLAN    Shauna Jensen RN

## 2023-12-29 NOTE — ASSESSMENT & PLAN NOTE
We will monitor blood glucose   Star Wedge Flap Text: The defect edges were debeveled with a #15 scalpel blade.  Given the location of the defect, shape of the defect and the proximity to free margins a star wedge flap was deemed most appropriate.  Using a sterile surgical marker, an appropriate rotation flap was drawn incorporating the defect and placing the expected incisions within the relaxed skin tension lines where possible. The area thus outlined was incised deep to adipose tissue with a #15 scalpel blade.  The skin margins were undermined to an appropriate distance in all directions utilizing iris scissors.

## 2023-12-29 NOTE — CARE PLAN
The patient's goals for the shift include rest    The clinical goals for the shift include wean off pressors    Over the shift, the patient did not make progress toward the following goals. Barriers to progression include inability to stop pressors d/t acute illness. Recommendations to address these barriers include .

## 2023-12-29 NOTE — PROGRESS NOTES
Ayanna Gross is a 73 y.o. female on day 23 of admission presenting with Hypotension, unspecified hypotension type.      Subjective     Patient denied fever or chills.  She is on nasal cannula maintain good oxygen saturation  Patient remain on low-dose of pressor.      Objective     Last Recorded Vitals  BP (!) 88/44   Pulse 92   Temp 36.7 °C (98.1 °F) (Temporal)   Resp 20   Wt 67.2 kg (148 lb 2.4 oz)   SpO2 97%   Intake/Output last 3 Shifts:  No intake or output data in the 24 hours ending 12/29/23 0836      Admission Weight  Weight: 72.6 kg (160 lb) (12/06/23 1301)    Daily Weight  12/28/23 : 67.2 kg (148 lb 2.4 oz)    Image Results      Physical Exam  General: Pleasant, cooperating during physical exam, on pressors.  HEENT: Pupils are equal and reactive to light and commendation , oral mucosa moist, no JVD .  Cardiovascular: Normal sinus rhythm, no MRG.  Lungs: Clear to auscultation bilaterally, no wheezing, no crackles, no dullness to percussion.  Abdomen: No hepatosplenomegaly appreciated, soft , not tender, positive bowel sounds, positive bowel movement.  Neuro: Alert and oriented x2, strength in upper and lower extremities , sensation intact.  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 difficile colitis.  History of chronic MRSA bacteremia, infective endocarditis.  Continue with doxycycline.  Patient completed full course of IV vancomycin  Continue gently with pressors  Patient remains on low-dose of pressors.    On her baseline patient has low blood pressure.  Dr. Mason  on the case    End stage renal disease   On  dialysis  Dr. Mondragon   on  the  case  ID on the case  Patient completed full course of fidaxomicin   started on p.o. vancomycin    Anemia of chronic disease  Positive for cold agglutinins  Patient was advised to follow-up with her primary care physician.  Consult Dr. Leonard from hematology oncology services  Evaluated by hematology oncology monitor close.      Pleural effusion  Status post thoracocentesis  Patient was found to have transudative pleural effusion  Pulmonary on the case    COVID-19 infection  Patient is not hypoxic.  Continue with contact isolation and droplet precaution.    Peripheral vascular disease   heel ulcers  Evaluated by podiatry  Follow-up with Jimenez Landry as outpatient     encephalopathy.  Clinically improved.    Patient is at high risk for deterioration-titrate arrhythmias.    Diabetes mellitus type II.  cover with insulin sliding scale    Discussed in detail with at bedside.  Monitor close patient is on low-dose of pressors.  With midodrine  Waiting for consult to see her today.  Continue with contact isolation droplet precaution.  CBC and BMP in AM.  Principal Problem:    Hypotension, unspecified hypotension type  Active Problems:    Paroxysmal atrial fibrillation (CMS/HCC)    Peripheral vascular disease (CMS/HCC)    Orthostatic hypotension    Mixed hyperlipidemia    End-stage renal disease on hemodialysis (CMS/HCC)    DM (diabetes mellitus) (CMS/HCC)    Absent pedal pulses                  Veronica Yepez MD

## 2023-12-29 NOTE — PROGRESS NOTES
"Ayanna Gross is a 73 y.o. female on day 23 of admission presenting with Hypotension, unspecified hypotension type.    Subjective   Interval History:   Room not entered-limit exposure  Patient observed through glass  Awake, alert  Patient discussed with nurse-on low-dose pressors          Review of Systems    Objective   Range of Vitals (last 24 hours)  Heart Rate:  []   Temp:  [36.6 °C (97.9 °F)-37.3 °C (99.1 °F)]   Resp:  [16-21]   BP: (88)/(44)   Height:  [157.5 cm (5' 2.01\")]   Weight:  [67.2 kg (148 lb 2.4 oz)]   SpO2:  [90 %-100 %]   Daily Weight  12/28/23 : 67.2 kg (148 lb 2.4 oz)    Body mass index is 27.09 kg/m².    Physical Exam  Awake, alert    Antibiotics  sodium chloride 0.9 % bolus 500 mL  cefepime (Maxipime) 1 g in dextrose 5 % 50 mL IV  vancomycin-diluent combo no.1 (Xellia) IVPB 1 g  apixaban (Eliquis) tablet 2.5 mg  calcium acetate (Phoslo) capsule 2,000 mg  escitalopram (Lexapro) tablet 10 mg  febuxostat (Uloric) tablet 40 mg  fenofibrate (Triglide) tablet 160 mg  gabapentin (Neurontin) capsule 300 mg  metoprolol succinate XL (Toprol-XL) 24 hr tablet 12.5 mg  midodrine (Proamatine) tablet 10 mg  nystatin (Mycostatin) 100,000 unit/gram powder 1 Application  B complex-vitamin C tablet 1 tablet  simvastatin (Zocor) tablet 40 mg  acetaminophen (Tylenol) tablet 650 mg  acetaminophen (Tylenol) tablet 650 mg  polyethylene glycol (Glycolax, Miralax) packet 17 g  dextrose 50 % injection 25 g  glucagon (Glucagen) injection 1 mg  dextrose 10 % in water (D10W) infusion  insulin lispro (HumaLOG) injection 0-5 Units  zinc oxide 20 % ointment 1 Application  calcium acetate (Phoslo) capsule 667 mg  vancomycin (Vancocin) capsule 125 mg  lidocaine PF (Xylocaine) 10 mg/mL (1 %) injection  heparin 1,000 unit/mL injection 2,000 Units  heparin 1,000 unit/mL injection 2,000 Units  midodrine (Proamatine) tablet 15 mg  albumin human 25 % solution 25 g  polyethylene glycol (Glycolax, Miralax) packet 17 " g  collagenase 250 unit/gram ointment  vancomycin (Vancocin) 1,750 mg in dextrose 5 % in water (D5W) 250 mL IV  vancomycin-diluent combo no.1 (Xellia) IVPB 1,750 mg  albumin human 25 % solution 12.5 g  heparin 1,000 unit/mL injection 2,000 Units  heparin 1,000 unit/mL injection 2,000 Units  vancomycin (Vancocin) placeholder  doxycycline (Vibramycin) capsule 100 mg  potassium chloride CR (Klor-Con) ER tablet 10 mEq  gabapentin (Neurontin) capsule 100 mg  heparin 1,000 unit/mL injection 2,000 Units  heparin 1,000 unit/mL injection 2,000 Units  piperacillin-tazobactam-dextrose (Zosyn) IV 2.25 g  doxycycline (Vibramycin) capsule 100 mg  ipratropium-albuteroL (Duo-Neb) 0.5-2.5 mg/3 mL nebulizer solution 3 mL  heparin 1,000 unit/mL injection 2,000 Units  heparin 1,000 unit/mL injection 2,000 Units  ipratropium-albuteroL (Duo-Neb) 0.5-2.5 mg/3 mL nebulizer solution 3 mL  dexAMETHasone (Decadron) injection 6 mg  albumin human 25 % solution 25 g  epoetin di-epbx (Retacrit) injection 10,000 Units  lidocaine PF (Xylocaine) 20 mg/mL (2 %) injection  potassium chloride CR (Klor-Con) ER tablet 10 mEq  albumin human 5 % infusion 25 g  potassium chloride CR (Klor-Con M20) ER tablet 20 mEq  norepinephrine (Levophed) 8 mg in dextrose 5% 250 mL (0.032 mg/mL) infusion (premix)  oxygen (O2) therapy  piperacillin-tazobactam-dextrose (Zosyn) IV 2.25 g  heparin 1,000 unit/mL injection 2,000 Units  heparin 1,000 unit/mL injection 2,000 Units  potassium chloride 20 mEq in 100 mL IV premix  fidaxomicin (Dificid) tablet 200 mg  norepinephrine (Levophed) 8 mg in dextrose 5% 250 mL (0.032 mg/mL) infusion (premix)  heparin 1,000 unit/mL injection 2,000 Units  heparin 1,000 unit/mL injection 2,000 Units  HYDROcodone-acetaminophen (Norco) 5-325 mg per tablet 1 tablet  dextrose 50 % injection 25 g  glucagon (Glucagen) injection 1 mg  dextrose 10 % in water (D10W) infusion  potassium chloride (Klor-Con) packet 20 mEq  potassium chloride 20 mEq in  100 mL IV premix  cosyntropin (Cortrosyn) injection 250 mcg  heparin 1,000 unit/mL injection 2,000 Units  heparin 1,000 unit/mL injection 2,000 Units  heparin 1,000 unit/mL injection 2,000 Units  heparin 1,000 unit/mL injection 2,000 Units  cholestyramine (Questran) 4 gram packet 4 g  potassium phosphate (monobasic) (K-Phos) tablet 500 mg  vancomycin (Vancocin) capsule 125 mg  heparin 1,000 unit/mL injection 2,000 Units  heparin 1,000 unit/mL injection 2,000 Units      Relevant Results  Labs  Results from last 72 hours   Lab Units 12/29/23 0443 12/28/23 0405 12/27/23  0522   WBC AUTO x10*3/uL 10.3 11.3 14.1*   HEMOGLOBIN g/dL 8.4* 9.2* 10.0*   HEMATOCRIT % 25.4* 26.8* 30.0*   PLATELETS AUTO x10*3/uL 229 226 215   NEUTROS PCT AUTO % 55.6 54.6 66.2   LYMPHS PCT AUTO % 26.2 26.7 18.5   MONOS PCT AUTO % 11.1 12.2 10.1   EOS PCT AUTO % 4.6 4.7 3.6     Results from last 72 hours   Lab Units 12/29/23 0443 12/28/23 0405 12/27/23  0522   SODIUM mmol/L 131* 130* 135   POTASSIUM mmol/L 4.2 5.0 4.0   CHLORIDE mmol/L 98 96* 99   CO2 mmol/L 24 23* 26   BUN mg/dL 25 18 10   CREATININE mg/dL 4.20* 3.10* 2.10*   GLUCOSE mg/dL 121* 159* 185*   CALCIUM mg/dL 8.2* 8.3* 8.4*   ANION GAP mmol/L 9 11 10   EGFR mL/min/1.73m*2 11* 16* 25*   PHOSPHORUS mg/dL 2.8 2.4* 1.7*     Results from last 72 hours   Lab Units 12/29/23 0443 12/28/23 0405 12/27/23  0522   ALBUMIN g/dL 2.2* 2.5* 2.6*     Estimated Creatinine Clearance: 11 mL/min (A) (by C-G formula based on SCr of 4.2 mg/dL (H)).  C-Reactive Protein   Date Value Ref Range Status   12/25/2023 5.20 (H) 0.00 - 2.00 mg/dL Final     CRP   Date Value Ref Range Status   06/23/2023 19.9 (H) 0 - 2.0 MG/DL Final     Comment:     Performed at [address] [address]   05/22/2022 1.0 0 - 2.0 MG/DL Final     Comment:     Performed at [address] [address]     Microbiology  Reviewed  Imaging  XR chest 1 view    Result Date: 12/26/2023  Interpreted  By:  Rikki Patel, STUDY: XR CHEST 1 VIEW; 12/26/2023 12:23 pm   INDICATION: Signs/Symptoms:hypoxia   COMPARISON: 12/19/2023   ACCESSION NUMBER(S): [id number]   ORDERING CLINICIAN: BRITTANI TOLEDO   FINDINGS: The study is limited due to rotation. Dialysis catheter is stable in position. The cardiac silhouette is indeterminate due to the technique. There is interval significant worsening of large left effusion and left lung atelectasis/infiltrates, with complete opacification of the left hemithorax. There is no pneumothorax. Vascular stents are noted in subclavian vessels. Surgical staples are again seen in the left axilla. The osseous structures are unchanged.       Limited study. Interval worsening of large left pleural effusion and left lung infiltrates/atelectasis, with complete opacification of the left hemithorax.   Signed by: Rikki Patel 12/26/2023 12:40 PM Dictation workstation:   [id number]    US thoracentesis    Result Date: 12/20/2023  Interpreted By:  Todd La, STUDY: US THORACENTESIS;  12/15/2023 3:43 pm   INDICATION: Signs/Symptoms:L pleural effusion.   COMPARISON: Chest x-rayDated 12/14/2023.   ACCESSION NUMBER(S): [id number]   ORDERING CLINICIAN: PARIS LUCAS   TECHNIQUE: INTERVENTIONALIST(S): Todd La M.D.   CONSENT: The patient/patient's POA/next of kin was informed of the nature of the proposed procedure. The purposes, alternatives, risks, and benefits were explained and discussed. All questions were answered and consent was obtained.   SEDATION: None   MEDICATION/CONTRAST: No additional   TIME OUT: A time out was performed immediately prior to procedure start with the interventional team, correctly identifying the patient name, date of birth, MRN, procedure, anatomy (including marking of site and side), patient position, procedure consent form, relevant laboratory and imaging test results, antibiotic administration, safety precautions, and procedure-specific equipment  needs.   FINDINGS: The patient was placed in the sitting position.   The pleural space was examined with grey scale ultrasound, and the most accessible fluid identified and marked for thoracentesis.   The skin was prepped and draped in usual manner. Local anesthesia with Lidocaine was administered and a  left-sided thoracentesis was performed.  A 5 [place] One-Step thoracentesis needle/catheter was then placed where marked.  Approximately 600 mL of yellowish colored fluid was removed.  The needle/catheter was then withdrawn.   The patient tolerated the procedure well and there were no immediate complications. Specimen(s) sent to the laboratory and pathology for further evaluation, per the requesting team.       Uneventful  left-sided thoracentesis, as detailed above.   I personally performed and/or directly supervised this study and was present for the entire procedure.   I personally reviewed the study and resident interpretation. I agree with the findings as stated.   Performed and dictated at [facility].   MACRO: None   Signed by: Todd La 12/20/2023 9:48 PM Dictation workstation:   [id number]    XR chest 1 view    Result Date: 12/19/2023  Interpreted By:  Judd Lee, STUDY: XR CHEST 1 VIEW; 12/19/2023 5:44 am   INDICATION: Signs/Symptoms:pleural effusion.   COMPARISON: December 14, 2023   ACCESSION NUMBER(S): [id number]   ORDERING CLINICIAN: PARIS LUCAS   FINDINGS: RESULT: Right sided central venous catheter is again noted extending into the right atrium. The distal tip is not well visualized. Left-sided vascular stents are noted in the left subclavian region. Left axillary surgical clips are noted. The cardiac silhouette is within normal limits for size. Mediastinal contours are unremarkable. Left basilar opacity obscures the left hemidiaphragm with hazy opacity throughout the left lung. There are degenerative changes of the thoracic spine and shoulders.        Persistent left basilar opacity obscures the left hemidiaphragm with hazy opacity extending through the left hemithorax. Findings likely represent pleural effusion atelectasis. Underlying infiltrate or mass is not excluded. Overall findings not significantly changed compared to previous exam     Signed by: Judd Lee 12/19/2023 9:02 AM Dictation workstation:   [id number]    XR chest 1 view    Result Date: 12/14/2023  Interpreted By:  Judd Lee, STUDY: XR CHEST 1 VIEW; 12/14/2023 10:43 am   INDICATION: Hypoxic respiratory failure.   COMPARISON: December 8, 2023   ACCESSION NUMBER(S): [id number]   ORDERING CLINICIAN: RODRI SURESH   FINDINGS: RESULT: Right-sided double-lumen dialysis catheter is noted with distal tip at the level of the right atrium. Vascular stents are noted in the left subclavian region. Surgical clips are noted in the left axilla. Cardiac silhouette size is indeterminate as the left heart border is obscured. There are calcifications involving the thoracic aorta. Left basilar obscurity obscures the left hemidiaphragm and left heart border with hazy opacity extending to the left mid lung. There are degenerative changes of the bilateral shoulders.       Increasing density in the left mid and lower lung suggests increasing infiltrate or pleural effusion.     Signed by: Judd Lee 12/14/2023 10:58 AM Dictation workstation:   [id number]    Vascular US ankle brachial index (OCTAVIO) without exercise    Result Date: 12/13/2023           [place] [address]            Phone [phone]  Vascular Lab Report  [facility] US ANKLE BRACHIAL INDEX (OCTAVIO) WITHOUT EXERCISE Patient Name:      BASIA Gray Physician: [id number] Lizet Messer MD Study Date:        12/11/2023          Ordering Provider: [id number] MARIANN SUTTON MRN/PID:           [id number]            Fellow: Accession#:         [id number]        Technologist:      Luz Maria Trivedi RVT Date of Birth/Age: 1950 / 73 years Technologist 2:    Dara Parr RVT Gender:            F                   Encounter#:        [id number] Admission Status:  Inpatient           Location           [place]                                        Performed:  Diagnosis/ICD: Other specified symptoms and signs involving the circulatory and                respiratory systems-R09.89 Indication:    Peripheral vascular disease CPT Codes:     07634.52 Peripheral artery OCTAVIO Only Reduced Service  Pertinent History: Absent pedal pulses. Patient is on dialysis.  CONCLUSIONS: Right Lower PVR: Evidence of moderate arterial occlusive disease in the right lower extremity at rest. Right pressures of >220 mmHg suggest no compressibility of vessels and may make absolute Segmental Limb Pressures (SLP) unreliable. Decreased digital perfusion noted. Biphasic flow is noted in the right dorsalis pedis artery. Left Lower PVR: Evidence of moderate arterial occlusive disease in the left lower extremity at rest. Left pressures of >220 mmHg suggest no compressibility of vessels and may make absolute Segmental Limb Pressures (SLP) unreliable. Decreased digital perfusion noted. Biphasic flow is noted in the left dorsalis pedis artery. Additional Findings: Technically difficult and limited exam due to patient's positioning, movement and inability to cooperate with exam.  Imaging & Doppler Findings:  RIGHT Lower PVR              Pressures Right Dorsalis Pedis (Ankle) 255 mmHg   LEFT Lower PVR              Pressures Left Dorsalis Pedis (Ankle) 255 mmHg   [id number] Lizet Messer MD Electronically signed by [id number] Lizet Messer MD on 12/13/2023 at 8:38:56 AM  ** Final **     CT head wo IV contrast    Result Date: 12/11/2023  Interpreted By:  Brendon Hook, STUDY: CT HEAD WO IV CONTRAST; 12/11/2023 5:20 pm   INDICATION: Signs/Symptoms:acute confusion.   COMPARISON: 20 August 2023    ACCESSION NUMBER(S): [id number]   ORDERING CLINICIAN: RODRI SURESH   TECHNIQUE: CT was performed with one or more of the following dose reduction techniques: automated exposure control, adjustment of the mA and/or kV according to patient size, or use of iterative reconstruction technique.       PROCEDURE: 3.0 mm axial images were obtained through the brain, to include the posterior fossa without intravenous contrast enhancement.   FINDINGS: The patient's head is turned to the left and slightly canted in the gantry. Mild motion artifact is seen. There is symmetric volume loss of the cerebral hemispheres. Moderate  decreased attenuation in the bilateral periventricular white matter, consistent with microangiopathy is noted.  There is no encephalomalacic change. Brainstem is unremarkable. Cerebellar hemispheres are symmetric. No subarachnoid, intraparenchymal, subdural or interventricular hemorrhage. No intra- or extra-axial mass or abnormal blood products are demonstrated. Hyperostosis frontalis interna is seen. Slight hypoplasia of the left mastoid.   The paranasal sinuses and mastoids as well as calvarium are unremarkable.       1. No acute intracranial findings. 2. Symmetric volume loss of the cerebral hemispheres. 3. Periventricular Microangiopathy. 4. No posttraumatic abnormality.   This report has been produced using speech recognition. This exam is available in DICOM format to non-affiliated healthcare facilities on a secure media free searchable basis with prior patient authorization. The patient exposure is reported to a radiation dose index registry. All CT examinations are performed with one or more of the following dose reduction techniques: Automated Exposure Control, Adjustment of mA and/or KV according to patient size, or use of iterative reconstruction techniques.   MACRO: None   Signed by: Brendon Hook 12/11/2023 7:05 PM Dictation workstation:   [id number]     thoracentesis    Result Date:  12/8/2023  Interpreted By:  Sandro Hernandez, STUDY: US THORACENTESIS; 12/7/2023 3:16 pm   INDICATION: Signs/Symptoms:Left pleural effusion question of whether this needs thoracentesis, history of ESRD, just had dialysis today.   COMPARISON: None   ACCESSION NUMBER(S): [id number]   ORDERING CLINICIAN: JEZ LONG   TECHNIQUE: Informed consent obtained. Patient positionedsitting upright. Skin prepped, draped and anesthetized. Under ultrasound guidance, a centesis catheter/needle was advanced into rightpleural cavity.   FINDINGS: A total of 750 cc of clear yellow fluid was aspirated. A sample was sent for analysis. The patient tolerated the procedure well.       Ultrasound-guided left thoracentesis.     Signed by: Sandro Hernandez 12/8/2023 3:01 PM Dictation workstation:   [id number]    XR chest 1 view    Result Date: 12/8/2023  Interpreted By:  Maria Garcia, STUDY: XR CHEST 1 VIEW 12/8/2023 11:57 am   INDICATION: Signs/Symptoms:S/P thoracentesis   COMPARISON: 12/06/2023   ACCESSION NUMBER(S): [id number]   ORDERING CLINICIAN: JEZ LONG   TECHNIQUE: AP erect view of the chest at bedside   FINDINGS: There is an interval decrease in size of left pleural effusion when compared with the study done 2 days earlier. No pneumothorax is seen.   There is some residual left pleural effusion with infiltrates seen in the left mid and lower lung field. The right lung is clear.   There are surgical clips within the left axilla. Stent grafts are visible in the left subclavian region extending into the left axillary region. A tunneled right jugular dialysis catheter terminates within right atrium.       Decreased size of left effusion following ultrasound-guided thoracentesis with no pneumothorax.   There is some residual left pleural effusion noted with infiltrate in the left mid and lower lung field.   Signed by: Maria Garcia 12/8/2023 12:51 PM Dictation workstation:   [id number]    XR foot 3+ views bilateral    Result  Date: 12/7/2023  Interpreted By:  Brendon Hook, STUDY: XR FOOT 3+ VIEWS BILATERAL; 12/7/2023 3:31 pm   INDICATION: Signs/Symptoms:Wound/Abscess/Acute neha process rule out/Comparative Exam.   COMPARISON: None   ACCESSION NUMBER(S): [id number]   ORDERING CLINICIAN: JOSSELYN STAHL   TECHNIQUE: Nonweightbearing AP, lateral and oblique views of the bilateral foot were performed.   FINDINGS: Right foot: Large heel spur is seen. Mild Achilles enthesopathy demonstrated. Moderate atherosclerotic disease is seen. There is suggestion of loss of the arch on this nonweightbearing view. Degenerative change anterior plafond demonstrated and dorsal talus as well as posterior plafond and dorsal talus. Sclerosis anterior calcaneus is seen. Degenerative changes mild between the midfoot and forefoot. 1st metatarsophalangeal joint degenerative changes demonstrated. 2nd ray absent phalanges. Distal phalanges of the 3rd through 5th rays with overlap were difficult to visualize. Distal phalanx of the great toe is difficult to visualize.   Left foot: Large heel spur is seen. Advanced atherosclerotic disease demonstrated. Degenerative change in the calcaneus and talus seen with moderate anterior and mild posterior plafond degenerative change. Mild degenerative change in the midfoot and forefoot is seen. Distal phalanx of the great toe is absent. Degenerative change of the 2nd ray phalanges and 3rd ray phalanges seen with degenerative change of the 4th and 5th digits not well seen.       1. Right foot: Large heel spur. Atherosclerotic disease. Suggestion of diffuse osteopenia. No sclerosis to suggest osteomyelitis. No subcutaneous air to suggest gangrene. Absent flanges 2nd ray with difficult to visualized distal phalanges of the other digits.   2. Left foot: Large heel spur. Degenerative changes of the hindfoot midfoot and forefoot with atherosclerotic disease. Absent distal phalanx of the great toe. No periostitis or abnormal  sclerosis with diffuse osteopenia to suggest osteomyelitis. No subcutaneous air to suggest gangrene.       Signed by: Brendon Hook 12/7/2023 8:39 PM Dictation workstation:   [id number]    ECG 12 Lead    Result Date: 12/7/2023   Poor data quality, interpretation may be adversely affected Normal sinus rhythm Right bundle branch block Septal infarct , age undetermined Possible Lateral infarct , age undetermined Abnormal ECG No previous ECGs available Confirmed by Hammad Callahan ([id number]) on 12/7/2023 1:07:11 PM    XR chest 1 view    Result Date: 12/6/2023  Interpreted By:  Roger Murdock, STUDY: XR CHEST 1 VIEW;  12/6/2023 2:08 pm   INDICATION: Signs/Symptoms:Brief AMS and hypotension, now resolved.   COMPARISON: 08/21/2023..   ACCESSION NUMBER(S): [id number]   ORDERING CLINICIAN: PATEL TINSLEY   FINDINGS: New moderate left pleural effusion. There is silhouetting of the left heart border. Left subclavian stent present. Right-sided double-lumen central venous catheter terminates within the right ventricle. No acute osseous findings.       New, moderate left pleural effusion.     MACRO: None   Signed by: Roger Murdock 12/6/2023 2:18 PM Dictation workstation:   [id number]     Assessment/Plan   Shock-etiology unclear-on low-dose pressors  Unstageable Bilateral heel ulcers  C-difficile infection-resolving  Type 2 diabetes mellitus with peripheral angiopathy with gangrene   acute respiratory failure-resolved  Coronavirus infection  Pleural effusion, s/p thoracentesis  History of chronic MRSA bacteremia, infective endocarditis-completed IV vancomycin, now on Doxycycline   ESRD on hemodialysis  Severe malnutrition-prealbumin of 3.2           Dificid-completed  Oral vancomycin-duration depends on clinical response  Questran  Oral doxycycline as suppressive therapy for MRSA bacteremia with possible infective endocarditis-positive blood culture in August 2023  Monitor temperature  and WBC  Local care  Offloading  High-protein  diet  Monitor stool  Podiatry follow up  Wean off pressors  Droplet plus precautions-COVID  Contact plus precautions-c.diff    Stephen Coulter MD

## 2023-12-29 NOTE — CARE PLAN
Problem: Diabetes  Goal: Achieve decreasing blood glucose levels by end of shift  Outcome: Progressing  Goal: Increase stability of blood glucose readings by end of shift  Outcome: Progressing  Goal: Maintain electrolyte levels within acceptable range throughout shift  Outcome: Progressing  Goal: Maintain glucose levels >70mg/dl to <250mg/dl throughout shift  Outcome: Progressing  Goal: No changes in neurological exam by end of shift  Outcome: Progressing  Goal: Learn about and adhere to nutrition recommendations by end of shift  Outcome: Progressing  Goal: Vital signs within normal range for age by end of shift  Outcome: Progressing  Goal: Increase self care and/or family involovement by end of shift  Outcome: Progressing  Goal: Receive DSME education by end of shift  Outcome: Progressing     Problem: Pain - Adult  Goal: Verbalizes/displays adequate comfort level or baseline comfort level  Outcome: Progressing     Problem: Safety - Adult  Goal: Free from fall injury  Outcome: Progressing     Problem: Discharge Planning  Goal: Discharge to home or other facility with appropriate resources  Outcome: Progressing     Problem: Chronic Conditions and Co-morbidities  Goal: Patient's chronic conditions and co-morbidity symptoms are monitored and maintained or improved  Outcome: Progressing     Problem: Skin  Goal: Decreased wound size/increased tissue granulation at next dressing change  Outcome: Progressing  Flowsheets (Taken 12/28/2023 1013 by Glenna Villasenor, RN)  Decreased wound size/increased tissue granulation at next dressing change: Promote sleep for wound healing  Goal: Participates in plan/prevention/treatment measures  Outcome: Progressing  Flowsheets (Taken 12/27/2023 1654 by Yuliana Hanson RN)  Participates in plan/prevention/treatment measures: Elevate heels  Goal: Prevent/manage excess moisture  Outcome: Progressing  Flowsheets (Taken 12/27/2023 1654 by Yuliana Hanson RN)  Prevent/manage excess  moisture:   Cleanse incontinence/protect with barrier cream   Monitor for/manage infection if present   Follow provider orders for dressing changes   Use wicking fabric (obtain order)   Moisturize dry skin  Goal: Prevent/minimize sheer/friction injuries  Outcome: Progressing  Flowsheets (Taken 12/27/2023 1654 by Yuliana Hanson, RN)  Prevent/minimize sheer/friction injuries:   Complete micro-shifts as needed if patient unable. Adjust patient position to relieve pressure points, not a full turn   Use pull sheet   HOB 30 degrees or less   Turn/reposition every 2 hours/use positioning/transfer devices   Utilize specialty bed per algorithm  Goal: Promote/optimize nutrition  Outcome: Progressing  Flowsheets (Taken 12/27/2023 1654 by Yuilana Hanson RN)  Promote/optimize nutrition:   Assist with feeding   Monitor/record intake including meals   Consume > 50% meals/supplements   Offer water/supplements/favorite foods  Goal: Promote skin healing  Outcome: Progressing  Flowsheets (Taken 12/27/2023 1654 by Yuliana Hanson RN)  Promote skin healing:   Assess skin/pad under line(s)/device(s)   Protective dressings over bony prominences   Turn/reposition every 2 hours/use positioning/transfer devices   Ensure correct size (line/device) and apply per  instructions   Rotate device position/do not position patient on device     Problem: Fall/Injury  Goal: Not fall by end of shift  Outcome: Progressing  Goal: Be free from injury by end of the shift  Outcome: Progressing  Goal: Verbalize understanding of personal risk factors for fall in the hospital  Outcome: Progressing  Goal: Verbalize understanding of risk factor reduction measures to prevent injury from fall in the home  Outcome: Progressing  Goal: Pace activities to prevent fatigue by end of the shift  Outcome: Progressing     Problem: Nutrition  Goal: Oral intake greater than 50%  Outcome: Progressing  Goal: Oral intake greater 75%  Outcome: Progressing  Goal:  Consume prescribed supplement  Outcome: Progressing  Goal: Adequate PO fluid intake  Outcome: Progressing  Goal: Nutrition support goals are met within 48 hrs  Outcome: Progressing  Goal: Nutrition support is meeting 75% of nutrient needs  Outcome: Progressing  Goal: Lab values WNL  Outcome: Progressing  Goal: Electrolytes WNL  Outcome: Progressing  Goal: Promote healing  Outcome: Progressing  Goal: Maintain stable weight  Outcome: Progressing  Goal: Reduce weight from edema/fluid  Outcome: Progressing  Goal: Gradual weight gain  Outcome: Progressing     Problem: Respiratory  Goal: Clear secretions with interventions this shift  Outcome: Progressing  Goal: Minimize anxiety/maximize coping throughout shift  Outcome: Progressing  Goal: Minimal/no exertional discomfort or dyspnea this shift  Outcome: Progressing  Goal: No signs of respiratory distress (eg. Use of accessory muscles. Peds grunting)  Outcome: Progressing  Goal: Patent airway maintained this shift  Outcome: Progressing  Goal: Verbalize decreased shortness of breath this shift  Outcome: Progressing  Goal: Wean oxygen to maintain O2 saturation per order/standard this shift  Outcome: Progressing     Problem: Pain  Goal: My pain/discomfort is manageable  Outcome: Progressing     Problem: Safety  Goal: Patient will be injury free during hospitalization  Outcome: Progressing  Goal: I will remain free of falls  Outcome: Progressing     Problem: Daily Care  Goal: Daily care needs are met  Outcome: Progressing     Problem: Psychosocial Needs  Goal: Demonstrates ability to cope with hospitalization/illness  Outcome: Progressing  Goal: Collaborate with me, my family, and caregiver to identify my specific goals  Outcome: Progressing     Problem: Discharge Barriers  Goal: My discharge needs are met  Outcome: Progressing     Problem: Pain  Goal: Takes deep breaths with improved pain control throughout the shift  Outcome: Progressing  Goal: Turns in bed with improved  pain control throughout the shift  Outcome: Progressing  Goal: Walks with improved pain control throughout the shift  Outcome: Progressing  Goal: Performs ADL's with improved pain control throughout shift  Outcome: Progressing  Goal: Participates in PT with improved pain control throughout the shift  Outcome: Progressing  Goal: Free from opioid side effects throughout the shift  Outcome: Progressing  Goal: Free from acute confusion related to pain meds throughout the shift  Outcome: Progressing   The patient's goals for the shift include rest    The clinical goals for the shift include wean off pressors       1

## 2023-12-29 NOTE — PROGRESS NOTES
"  Critical Care Progress Note    Ayanna Gross is a 73 y.o. female on day 23 of admission presenting with Hypotension, unspecified hypotension type.    Subjective   Persistent need for vasopressors.  Was off Levophed for a brief time yesterday.  Pressure dropped to 50 systolic overnight pressors restarted.    Objective   Vital Signs      12/29/2023     1:00 AM 12/29/2023     2:00 AM 12/29/2023     4:00 AM 12/29/2023     5:00 AM 12/29/2023     6:00 AM 12/29/2023     7:05 AM 12/29/2023     8:45 AM   Vitals   Heart Rate 96 92 95 100 92     Temp   37 °C (98.6 °F)   36.7 °C (98.1 °F) 36.6 °C (97.9 °F)   Resp 19 19 19 20 20     Height (in)       1.575 m (5' 2.01\")   BMI       27.09 kg/m2   BSA (m2)       1.71 m2       Oxygen Therapy  SpO2: 97 %  Medical Gas Therapy: Supplemental oxygen  O2 Delivery Method: Nasal cannula         Intake/Output previous 24 hours:    Intake/Output Summary (Last 24 hours) at 12/29/2023 0917  Last data filed at 12/29/2023 0845  Gross per 24 hour   Intake 600 ml   Output --   Net 600 ml       Vitals show blood pressure in the 80s and 90s systolic Constitutional:       Appearance: Normal appearance.   HENT:      Head: Normocephalic and atraumatic.   Eyes:      Extraocular Movements: Extraocular movements intact.      Pupils: Pupils are equal, round, and reactive to light.   Cardiovascular:      Rate and Rhythm: Normal rate and regular rhythm.      Pulses: Normal pulses.      Heart sounds: Normal heart sounds.   Pulmonary:      Effort: Pulmonary effort is normal.      Breath sounds: Normal breath sounds.   Abdominal:      General: Bowel sounds are normal.      Palpations: Abdomen is soft.   Musculoskeletal:      Cervical back: Normal range of motion and neck supple.   Skin:     General: Skin is warm and dry.   Neurological:      General: No focal deficit present.      Mental Status: She is alert and oriented to person, place, and time.     Lines and Tubes:  CVC Double lumen Tunneled Right " Subclavian (Active)   Earliest Known Present: 12/06/23   Lumen Type: Double lumen  CVC Type: Tunneled  Description (optional): Permacath  Orientation: Right  Location: Subclavian   Number of days: 20       CVC 12/20/23 Triple lumen Non-tunneled Left Femoral (Active)   Placement Date/Time: 12/20/23 2233   Hand Hygiene Performed Prior to CVC Insertion: Yes  Site Prep: Chlorhexidine   Site Prep Agent has Completely Dried Before Insertion: Yes  All 5 Sterile Barriers Used (Gloves, Gown, Cap, Mask, Large Sterile Drape):...   Number of days: 5       Peripheral IV 12/21/23 22 G Right Forearm (Active)   Placement Date/Time: 12/21/23 0130   Size (Gauge): 22 G  Orientation: Right  Location: Forearm  Site Prep: Alcohol   Number of days: 5       Arterial Line 12/16/23 Right Femoral (Active)   Placement Date/Time: 12/16/23 (c) 1755   Size: 18 G  Orientation: Right  Location: Femoral  Site Prep: Chlorhexidine   Local Anesthetic: Injectable  Insertion attempts: 1  Securement Method: Transparent dressing   Number of days: 9       Rectal Tube With balloon (Active)   Placement Date/Time: 12/19/23 1300   Type: (c) With balloon   Number of days: 6         Scheduled medications  apixaban, 2.5 mg, oral, BID  cholestyramine, 4 g, oral, Daily with evening meal  collagenase, , Topical, Daily  doxycycline, 100 mg, oral, Daily  epoetin di or biosimilar, 10,000 Units, intravenous, Every Mon/Wed/Fri  escitalopram, 10 mg, oral, Daily  febuxostat, 40 mg, oral, Every other day  fenofibrate, 160 mg, oral, Daily  gabapentin, 100 mg, oral, BID  heparin, 2,000 Units, intra-catheter, After Dialysis  heparin, 2,000 Units, intra-catheter, After Dialysis  heparin, 2,000 Units, intra-catheter, After Dialysis  heparin, 2,000 Units, intra-catheter, After Dialysis  insulin lispro, 0-5 Units, subcutaneous, TID with meals  ipratropium-albuteroL, 3 mL, nebulization, TID  [Held by provider] metoprolol succinate XL, 12.5 mg, oral, Daily  midodrine, 15 mg, oral,  TID with meals  nystatin, 1 Application, Topical, BID  simvastatin, 40 mg, oral, Nightly  vancomycin, 125 mg, oral, 4x daily      Continuous medications  norepinephrine, 0.01-3 mcg/kg/min, Last Rate: 0.03 mcg/kg/min (12/29/23 0606)      PRN medications  PRN medications: acetaminophen, acetaminophen, dextrose 10 % in water (D10W), dextrose, glucagon, HYDROcodone-acetaminophen, oxygen, polyethylene glycol, zinc oxide    Relevant Results  Results from last 7 days   Lab Units 12/29/23  0443 12/28/23  0405 12/27/23  0522   WBC AUTO x10*3/uL 10.3 11.3 14.1*   HEMOGLOBIN g/dL 8.4* 9.2* 10.0*   HEMATOCRIT % 25.4* 26.8* 30.0*   PLATELETS AUTO x10*3/uL 229 226 215      Results from last 7 days   Lab Units 12/29/23 0443 12/28/23 0405 12/27/23 0522   SODIUM mmol/L 131* 130* 135   POTASSIUM mmol/L 4.2 5.0 4.0   CHLORIDE mmol/L 98 96* 99   CO2 mmol/L 24 23* 26   BUN mg/dL 25 18 10   CREATININE mg/dL 4.20* 3.10* 2.10*   GLUCOSE mg/dL 121* 159* 185*   CALCIUM mg/dL 8.2* 8.3* 8.4*          XR chest 1 view 12/19/2023    Narrative  Interpreted By:  Judd Lee,  STUDY:  XR CHEST 1 VIEW; 12/19/2023 5:44 am    INDICATION:  Signs/Symptoms:pleural effusion.    COMPARISON:  December 14, 2023    ACCESSION NUMBER(S):  [id number]    ORDERING CLINICIAN:  PARIS LUCAS    FINDINGS:  RESULT: Right sided central venous catheter is again noted extending  into the right atrium. The distal tip is not well visualized.  Left-sided vascular stents are noted in the left subclavian region.  Left axillary surgical clips are noted. The cardiac silhouette is  within normal limits for size. Mediastinal contours are unremarkable.  Left basilar opacity obscures the left hemidiaphragm with hazy  opacity throughout the left lung. There are degenerative changes of  the thoracic spine and shoulders.    Impression  Persistent left basilar opacity obscures the left hemidiaphragm with  hazy opacity extending through the left hemithorax. Findings  likely  represent pleural effusion atelectasis. Underlying infiltrate or mass  is not excluded. Overall findings not significantly changed compared  to previous exam      Signed by: Judd Lee 12/19/2023 9:02 AM  Dictation workstation:   [id number]      Patient Active Problem List   Diagnosis    Amputation of finger, except thumb    Tenosynovitis    Inflammatory dermatosis    Skin abnormality    Dry skin    Arthritis    Syncope    Swelling of finger    Sinusitis, acute    Bronchitis, acute    Second degree burn of single finger of left hand, not thumb    Rash    Postmenopausal bleeding    Pneumonia    Infection    Phlegmon    Paroxysmal atrial fibrillation (CMS/HCC)    Right arm pain    Pain in right shoulder    Skin changes due to chronic exposure to nonionizing radiation, unspecified    Other seborrheic keratosis    Inflamed seborrheic keratosis    Actinic keratosis    Open wound of finger    Obstructive sleep apnea    Obesity with body mass index 30 or greater    Nuclear senile cataract    Nonhealing surgical wound    Neoplasm of uncertain behavior of skin    Miosis    Mild nonproliferative diabetic retinopathy (CMS/HCC)    Lactic acidosis    Injury of head    Infection due to Escherichia coli    Hyponatremia    Peripheral vascular disease (CMS/HCC)    Orthostatic hypotension    Low blood pressure    Hypertension    History of breast cancer in female    Personal history of other malignant neoplasm of skin    High cholesterol    Mixed hyperlipidemia    Heart failure (CMS/HCC)    Headache    Growing pains    Gout    Chronic gout with tophus    Gastrointestinal hemorrhage    Fecal incontinence    Fatigue    Fall    Facial basal cell cancer    End-stage renal disease on hemodialysis (CMS/HCC)    Anemia of chronic renal failure    End stage renal disease (CMS/HCC)    Difficulty walking    Dyslipidemia    Diabetic foot ulcer (CMS/HCC)    Dermatochalasis of right upper eyelid    Dermatochalasis of left upper  eyelid    Mixed anxiety and depressive disorder    Depression    Dependence on renal dialysis (CMS/HCC)    Contusion of face    Clouded consciousness    Chronic pain of both knees    Chronic osteomyelitis (CMS/HCC)    Chronic kidney disease, stage 3 (CMS/[facility])    Chronic kidney disease (CKD), stage IV (severe) (CMS/[facility])    Chorioretinal scar    Cerebrovascular accident (CMS/[facility])    Breast cancer (CMS/[facility])    Arthritis of knee, right    Arthritis of knee, left    Anemia of renal disease    Altered mental status    Colon cancer screening    Cellulitis of finger    Loss of consciousness (CMS/[facility])    Tenosynovitis of fingers    Thyroid nodule    Tinea corporis    Toxic metabolic encephalopathy    DM (diabetes mellitus) (CMS/[facility])    Hypoglycemia    Type 2 diabetes mellitus (CMS/[facility])    Visual hallucinations    Vocal cord paralysis    Wound infection    Hypotension, unspecified hypotension type    Absent pedal pulses     Assessment/Plan   Septic shock  COVID-19 infection  C. difficile colitis  End-stage renal disease  Possible endocarditis  Transudative Pleural effusion, s/p thoracentesis  History of chronic MRSA bacteremia, infective endocarditis-completed IV vancomycin, now on Doxycycline   Severe malnutrition      Remains on pressors.  Dialysis per renal.  Oral doxycycline for history of MRSA.  Continue Dificid  Wound care  Minimum systolic pressure of 70 acceptable per nephrology.  Continue to attempt to wean off Levophed  Continue midodrine  Wean oxygen as tolerated  Left femoral triple-lumen catheter.  White blood cell count is improving.  However I have continued concern/cannot rule out that there could be could be some infection related issues affecting her blood pressure.  Feel ideally we should remove this catheter for an IJ site.     Matthew Engle MD  [facility]       Disclaimer: Parts of this chart were dictated with voice recognition software. Please excuse any errors of grammar, spelling, or  transcription which are not corrected. Please contact me with any questions regarding documentation.

## 2023-12-29 NOTE — PROCEDURES
Bedside report received from FLAQUITO (   WILLIAM GANN  )  Hemodialysis Treatment to be done @ bedside.  Name and  verified verbally by patient.   Bed weight is ( 67.2KG )  Patient is on (  2 ) Liters O2 via nasal cannula.  Prescribed Hemodialysis orders are verified in the machine.  Hemodialysis orders are for ( 1.0 )Liters of fluid removal    Pre- Treatment VS:  BP 92/43   Temp 36.6C   Pulse 88               Patient has a RIGHT SUBCLAVIAN CVC  Dressing is CLEAN DRY AND INTACT.   No visible S/S of infection noted.  Patient denies pain near insertion site.  CVC dressing changed today. Dated and signed.    CVC accessed with aseptic technique.   Caps removed.  Arterial and venous lumens aspirated without difficulty.  Patency of both lumens checked with 2 (10) ml normal saline flushes.  Machine pressures are WNL     Treatment initiated @0845  Treatment Completed @ 1245    Blood Retransfused. VSS.   Arterial and venous lumens flushed with 10 ml NS.  A Heparin dwell was administered.  1.6 arterial and venous  Patient is in no pain and denies complaints.  Post HD Report called to Bedside FLAQUITO gann  1.0 liters removed           -----------------------------------------------------------------------------

## 2023-12-30 NOTE — PROGRESS NOTES
Ayanna Gross is a 73 y.o. female on day 24 of admission presenting with Hypotension, unspecified hypotension type.      Subjective   No acute events, remains critically ill on pressors, comfortable though on room air       Objective          Vitals 24HR  Heart Rate:  []   Temp:  [36.3 °C (97.3 °F)]   Resp:  [10-30]   Weight:  [67.2 kg (148 lb 2.4 oz)]   SpO2:  [92 %-100 %]         Intake/Output last 3 Shifts:    Intake/Output Summary (Last 24 hours) at 12/30/2023 1159  Last data filed at 12/29/2023 1738  Gross per 24 hour   Intake 1000 ml   Output 2800 ml   Net -1800 ml       Physical Exam    General: No acute distress  Full exam deferred to minimize COVID exposure  Relevant Results               Assessment/Plan      73-year-old female with end-stage renal disease  Shock    -Will plan for dialysis on low-dose pressors tomorrow  -Will try cold dialysate  -Continue with midodrine and stress dose steroids  Ernesto Andrade MD

## 2023-12-30 NOTE — CARE PLAN
The patient's goals for the shift include maintain comfort - Goal met with repositioning.    The clinical goals for the shift include stable blood pressure - Goal met, remains off levophed.    Over the shift, the patient did not make progress toward the following goals. Barriers to progression include patient does not like supplements. Recommendations to address these barriers include encourage oral intake, family brought in food which patient has appetite for.    Problem: Nutrition  Goal: Consume prescribed supplement  Outcome: Not Progressing - Does not consume.      Problem: Pain  Goal: Participates in PT with improved pain control throughout the shift  Outcome: Not Progressing - has not been cleared to work with PT again yet.

## 2023-12-30 NOTE — PROGRESS NOTES
Ayanna Gross is a 73 y.o. female on day 24 of admission presenting with Hypotension, unspecified hypotension type.    Subjective   Interval History:   Afebrile  Off pressors  Still has some loose bowel movement  Abdominal pain  On room air        Review of Systems   All other systems reviewed and are negative.      Objective   Range of Vitals (last 24 hours)  Heart Rate:  []   Temp:  [35.9 °C (96.6 °F)-36.3 °C (97.3 °F)]   Resp:  [10-30]   Weight:  [67.2 kg (148 lb 2.4 oz)]   SpO2:  [92 %-100 %]   Daily Weight  12/29/23 : 67.2 kg (148 lb 2.4 oz)    Body mass index is 27.09 kg/m².    Physical Exam  Constitutional:       Appearance: Normal appearance.   HENT:      Head: Normocephalic and atraumatic.      Nose: Nose normal.   Eyes:      Extraocular Movements: Extraocular movements intact.      Conjunctiva/sclera: Conjunctivae normal.   Cardiovascular:      Rate and Rhythm: Regular rhythm.      Heart sounds: Normal heart sounds.   Pulmonary:      Breath sounds: Decreased breath sounds present.   Abdominal:      General: Bowel sounds are normal.      Palpations: Abdomen is soft.   Skin:     Comments: Bilateral heel eschar    Neurological:      Mental Status: She is alert and oriented to person, place, and time.     Antibiotics  sodium chloride 0.9 % bolus 500 mL  cefepime (Maxipime) 1 g in dextrose 5 % 50 mL IV  vancomycin-diluent combo no.1 (Xellia) IVPB 1 g  apixaban (Eliquis) tablet 2.5 mg  calcium acetate (Phoslo) capsule 2,000 mg  escitalopram (Lexapro) tablet 10 mg  febuxostat (Uloric) tablet 40 mg  fenofibrate (Triglide) tablet 160 mg  gabapentin (Neurontin) capsule 300 mg  metoprolol succinate XL (Toprol-XL) 24 hr tablet 12.5 mg  midodrine (Proamatine) tablet 10 mg  nystatin (Mycostatin) 100,000 unit/gram powder 1 Application  B complex-vitamin C tablet 1 tablet  simvastatin (Zocor) tablet 40 mg  acetaminophen (Tylenol) tablet 650 mg  acetaminophen (Tylenol) tablet 650 mg  polyethylene glycol (Glycolax,  Miralax) packet 17 g  dextrose 50 % injection 25 g  glucagon (Glucagen) injection 1 mg  dextrose 10 % in water (D10W) infusion  insulin lispro (HumaLOG) injection 0-5 Units  zinc oxide 20 % ointment 1 Application  calcium acetate (Phoslo) capsule 667 mg  vancomycin (Vancocin) capsule 125 mg  lidocaine PF (Xylocaine) 10 mg/mL (1 %) injection  heparin 1,000 unit/mL injection 2,000 Units  heparin 1,000 unit/mL injection 2,000 Units  midodrine (Proamatine) tablet 15 mg  albumin human 25 % solution 25 g  polyethylene glycol (Glycolax, Miralax) packet 17 g  collagenase 250 unit/gram ointment  vancomycin (Vancocin) 1,750 mg in dextrose 5 % in water (D5W) 250 mL IV  vancomycin-diluent combo no.1 (Xellia) IVPB 1,750 mg  albumin human 25 % solution 12.5 g  heparin 1,000 unit/mL injection 2,000 Units  heparin 1,000 unit/mL injection 2,000 Units  vancomycin (Vancocin) placeholder  doxycycline (Vibramycin) capsule 100 mg  potassium chloride CR (Klor-Con) ER tablet 10 mEq  gabapentin (Neurontin) capsule 100 mg  heparin 1,000 unit/mL injection 2,000 Units  heparin 1,000 unit/mL injection 2,000 Units  piperacillin-tazobactam-dextrose (Zosyn) IV 2.25 g  doxycycline (Vibramycin) capsule 100 mg  ipratropium-albuteroL (Duo-Neb) 0.5-2.5 mg/3 mL nebulizer solution 3 mL  heparin 1,000 unit/mL injection 2,000 Units  heparin 1,000 unit/mL injection 2,000 Units  ipratropium-albuteroL (Duo-Neb) 0.5-2.5 mg/3 mL nebulizer solution 3 mL  dexAMETHasone (Decadron) injection 6 mg  albumin human 25 % solution 25 g  epoetin di-epbx (Retacrit) injection 10,000 Units  lidocaine PF (Xylocaine) 20 mg/mL (2 %) injection  potassium chloride CR (Klor-Con) ER tablet 10 mEq  albumin human 5 % infusion 25 g  potassium chloride CR (Klor-Con M20) ER tablet 20 mEq  norepinephrine (Levophed) 8 mg in dextrose 5% 250 mL (0.032 mg/mL) infusion (premix)  oxygen (O2) therapy  piperacillin-tazobactam-dextrose (Zosyn) IV 2.25 g  heparin 1,000 unit/mL injection 2,000  Units  heparin 1,000 unit/mL injection 2,000 Units  potassium chloride 20 mEq in 100 mL IV premix  fidaxomicin (Dificid) tablet 200 mg  norepinephrine (Levophed) 8 mg in dextrose 5% 250 mL (0.032 mg/mL) infusion (premix)  heparin 1,000 unit/mL injection 2,000 Units  heparin 1,000 unit/mL injection 2,000 Units  HYDROcodone-acetaminophen (Norco) 5-325 mg per tablet 1 tablet  dextrose 50 % injection 25 g  glucagon (Glucagen) injection 1 mg  dextrose 10 % in water (D10W) infusion  potassium chloride (Klor-Con) packet 20 mEq  potassium chloride 20 mEq in 100 mL IV premix  cosyntropin (Cortrosyn) injection 250 mcg  heparin 1,000 unit/mL injection 2,000 Units  heparin 1,000 unit/mL injection 2,000 Units  heparin 1,000 unit/mL injection 2,000 Units  heparin 1,000 unit/mL injection 2,000 Units  cholestyramine (Questran) 4 gram packet 4 g  potassium phosphate (monobasic) (K-Phos) tablet 500 mg  vancomycin (Vancocin) capsule 125 mg  heparin 1,000 unit/mL injection 2,000 Units  heparin 1,000 unit/mL injection 2,000 Units  hydrocortisone sod succ (PF) (Solu-CORTEF) injection 100 mg      Relevant Results  Labs  Results from last 72 hours   Lab Units 12/30/23 0443 12/29/23 0443 12/28/23  0405   WBC AUTO x10*3/uL 8.5 10.3 11.3   HEMOGLOBIN g/dL 9.6* 8.4* 9.2*   HEMATOCRIT % 28.6* 25.4* 26.8*   PLATELETS AUTO x10*3/uL 231 229 226   NEUTROS PCT AUTO % 75.9 55.6 54.6   LYMPHS PCT AUTO % 20.0 26.2 26.7   MONOS PCT AUTO % 2.2 11.1 12.2   EOS PCT AUTO % 0.0 4.6 4.7     Results from last 72 hours   Lab Units 12/30/23 0443 12/29/23 0443 12/28/23  0405   SODIUM mmol/L 134 131* 130*   POTASSIUM mmol/L 3.9 4.2 5.0   CHLORIDE mmol/L 97 98 96*   CO2 mmol/L 24 24 23*   BUN mg/dL 15 25 18   CREATININE mg/dL 2.70* 4.20* 3.10*   GLUCOSE mg/dL 225* 121* 159*   CALCIUM mg/dL 8.2* 8.2* 8.3*   ANION GAP mmol/L 13 9 11   EGFR mL/min/1.73m*2 18* 11* 16*   PHOSPHORUS mg/dL 2.9 2.8 2.4*     Results from last 72 hours   Lab Units 12/30/23  044  12/29/23  0443 12/28/23  0405   ALBUMIN g/dL 2.5* 2.2* 2.5*     Estimated Creatinine Clearance: 17.2 mL/min (A) (by C-G formula based on SCr of 2.7 mg/dL (H)).  C-Reactive Protein   Date Value Ref Range Status   12/25/2023 5.20 (H) 0.00 - 2.00 mg/dL Final     CRP   Date Value Ref Range Status   06/23/2023 19.9 (H) 0 - 2.0 MG/DL Final     Comment:     Performed at [address] [address]   05/22/2022 1.0 0 - 2.0 MG/DL Final     Comment:     Performed at [address] [address]     Microbiology    Reviewed  Imaging  XR chest 1 view    Result Date: 12/26/2023  Interpreted By:  Rikki Patel, STUDY: XR CHEST 1 VIEW; 12/26/2023 12:23 pm   INDICATION: Signs/Symptoms:hypoxia   COMPARISON: 12/19/2023   ACCESSION NUMBER(S): [id number]   ORDERING CLINICIAN: BRITTANI TOLEDO   FINDINGS: The study is limited due to rotation. Dialysis catheter is s: Sundar E came in to be was you leaving the TV is you you leave it off the wave table in position. The cardiac silhouette is indeterminate due to the technique. There is interval significant worsening of large left effusion and left lung atelectasis/infiltrates, with complete opacification of the left hemithorax. There is no pneumothorax. Vascular stents are noted in subclavian vessels. Surgical staples are again seen in the left axilla. The osseous structures are unchanged.       Limited study. Interval worsening of large left pleural effusion and left lung infiltrates/atelectasis, with complete opacification of the left hemithorax.   Signed by: Rikki Patel 12/26/2023 12:40 PM Dictation workstation:   [id number]    US thoracentesis    Result Date: 12/20/2023  Interpreted By:  Todd La, STUDY: US THORACENTESIS;  12/15/2023 3:43 pm   INDICATION: Signs/Symptoms:L pleural effusion.   COMPARISON: Chest x-rayDated 12/14/2023.   ACCESSION NUMBER(S): [id number]   ORDERING CLINICIAN: PARIS LUCAS   TECHNIQUE: INTERVENTIONALIST(S):  Todd La M.D.   CONSENT: The patient/patient's POA/next of kin was informed of the nature of the proposed procedure. The purposes, alternatives, risks, and benefits were explained and discussed. All questions were answered and consent was obtained.   SEDATION: None   MEDICATION/CONTRAST: No additional   TIME OUT: A time out was performed immediately prior to procedure start with the interventional team, correctly identifying the patient name, date of birth, MRN, procedure, anatomy (including marking of site and side), patient position, procedure consent form, relevant laboratory and imaging test results, antibiotic administration, safety precautions, and procedure-specific equipment needs.   FINDINGS: The patient was placed in the sitting position.   The pleural space was examined with grey scale ultrasound, and the most accessible fluid identified and marked for thoracentesis.   The skin was prepped and draped in usual manner. Local anesthesia with Lidocaine was administered and a  left-sided thoracentesis was performed.  A 5 Croatian One-Step thoracentesis needle/catheter was then placed where marked.  Approximately 600 mL of yellowish colored fluid was removed.  The needle/catheter was then withdrawn.   The patient tolerated the procedure well and there were no immediate complications. Specimen(s) sent to the laboratory and pathology for further evaluation, per the requesting team.       Uneventful  left-sided thoracentesis, as detailed above.   I personally performed and/or directly supervised this study and was present for the entire procedure.   I personally reviewed the study and resident interpretation. I agree with the findings as stated.   Performed and dictated at [facility].   MACRO: None   Signed by: Todd La 12/20/2023 9:48 PM Dictation workstation:   [id number]    XR chest 1 view    Result Date: 12/19/2023  Interpreted By:  Judd Lee, STUDY:  XR CHEST 1 VIEW; 12/19/2023 5:44 am   INDICATION: Signs/Symptoms:pleural effusion.   COMPARISON: December 14, 2023   ACCESSION NUMBER(S): [id number]   ORDERING CLINICIAN: PARIS LUCAS   FINDINGS: RESULT: Right sided central venous catheter is again noted extending into the right atrium. The distal tip is not well visualized. Left-sided vascular stents are noted in the left subclavian region. Left axillary surgical clips are noted. The cardiac silhouette is within normal limits for size. Mediastinal contours are unremarkable. Left basilar opacity obscures the left hemidiaphragm with hazy opacity throughout the left lung. There are degenerative changes of the thoracic spine and shoulders.       Persistent left basilar opacity obscures the left hemidiaphragm with hazy opacity extending through the left hemithorax. Findings likely represent pleural effusion atelectasis. Underlying infiltrate or mass is not excluded. Overall findings not significantly changed compared to previous exam     Signed by: Judd Lee 12/19/2023 9:02 AM Dictation workstation:   [id number]    XR chest 1 view    Result Date: 12/14/2023  Interpreted By:  Judd Lee, STUDY: XR CHEST 1 VIEW; 12/14/2023 10:43 am   INDICATION: Hypoxic respiratory failure.   COMPARISON: December 8, 2023   ACCESSION NUMBER(S): [id number]   ORDERING CLINICIAN: RODRI SURESH   FINDINGS: RESULT: Right-sided double-lumen dialysis catheter is noted with distal tip at the level of the right atrium. Vascular stents are noted in the left subclavian region. Surgical clips are noted in the left axilla. Cardiac silhouette size is indeterminate as the left heart border is obscured. There are calcifications involving the thoracic aorta. Left basilar obscurity obscures the left hemidiaphragm and left heart border with hazy opacity extending to the left mid lung. There are degenerative changes of the bilateral shoulders.       Increasing density in the left mid and  lower lung suggests increasing infiltrate or pleural effusion.     Signed by: Judd Lee 12/14/2023 10:58 AM Dictation workstation:   [id number]    Vascular US ankle brachial index (OCTAVIO) without exercise    Result Date: 12/13/2023           [place] [address]            Phone [phone]  Vascular Lab Report  [facility] US ANKLE BRACHIAL INDEX (OCTAVIO) WITHOUT EXERCISE Patient Name:      BASIA Gray Physician: [id number] Lizet Messer MD Study Date:        12/11/2023          Ordering Provider: [id number] MARIANN SUTTON MRN/PID:           [id number]            Fellow: Accession#:        [id number]        Technologist:      Luz Maria Trivedi RVT Date of Birth/Age: 1950 / 73 years Technologist 2:    Dara Parr RVT Gender:            F                   Encounter#:        [id number] Admission Status:  Inpatient           Location           [facility]                                        Performed:  Diagnosis/ICD: Other specified symptoms and signs involving the circulatory and                respiratory systems-R09.89 Indication:    Peripheral vascular disease CPT Codes:     61349.52 Peripheral artery OCTAVIO Only Reduced Service  Pertinent History: Absent pedal pulses. Patient is on dialysis.  CONCLUSIONS: Right Lower PVR: Evidence of moderate arterial occlusive disease in the right lower extremity at rest. Right pressures of >220 mmHg suggest no compressibility of vessels and may make absolute Segmental Limb Pressures (SLP) unreliable. Decreased digital perfusion noted. Biphasic flow is noted in the right dorsalis pedis artery. Left Lower PVR: Evidence of moderate arterial occlusive disease in the left lower extremity at rest. Left pressures of >220 mmHg suggest no compressibility of vessels and may make absolute Segmental Limb Pressures (SLP) unreliable. Decreased digital perfusion noted.  Biphasic flow is noted in the left dorsalis pedis artery. Additional Findings: Technically difficult and limited exam due to patient's positioning, movement and inability to cooperate with exam.  Imaging & Doppler Findings:  RIGHT Lower PVR              Pressures Right Dorsalis Pedis (Ankle) 255 mmHg   LEFT Lower PVR              Pressures Left Dorsalis Pedis (Ankle) 255 mmHg   [id number] Lizet Messer MD Electronically signed by [id number] Lizet Messer MD on 12/13/2023 at 8:38:56 AM  ** Final **     CT head wo IV contrast    Result Date: 12/11/2023  Interpreted By:  Brendon Hook, STUDY: CT HEAD WO IV CONTRAST; 12/11/2023 5:20 pm   INDICATION: Signs/Symptoms:acute confusion.   COMPARISON: 20 August 2023   ACCESSION NUMBER(S): [id number]   ORDERING CLINICIAN: RODRI SURESH   TECHNIQUE: CT was performed with one or more of the following dose reduction techniques: automated exposure control, adjustment of the mA and/or kV according to patient size, or use of iterative reconstruction technique.       PROCEDURE: 3.0 mm axial images were obtained through the brain, to include the posterior fossa without intravenous contrast enhancement.   FINDINGS: The patient's head is turned to the left and slightly canted in the gantry. Mild motion artifact is seen. There is symmetric volume loss of the cerebral hemispheres. Moderate  decreased attenuation in the bilateral periventricular white matter, consistent with microangiopathy is noted.  There is no encephalomalacic change. Brainstem is unremarkable. Cerebellar hemispheres are symmetric. No subarachnoid, intraparenchymal, subdural or interventricular hemorrhage. No intra- or extra-axial mass or abnormal blood products are demonstrated. Hyperostosis frontalis interna is seen. Slight hypoplasia of the left mastoid.   The paranasal sinuses and mastoids as well as calvarium are unremarkable.       1. No acute intracranial findings. 2. Symmetric volume loss of the cerebral hemispheres. 3.  Periventricular Microangiopathy. 4. No posttraumatic abnormality.   This report has been produced using speech recognition. This exam is available in DICOM format to non-affiliated healthcare facilities on a secure media free searchable basis with prior patient authorization. The patient exposure is reported to a radiation dose index registry. All CT examinations are performed with one or more of the following dose reduction techniques: Automated Exposure Control, Adjustment of mA and/or KV according to patient size, or use of iterative reconstruction techniques.   MACRO: None   Signed by: Brendon Hook 12/11/2023 7:05 PM Dictation workstation:   [id number]    US thoracentesis    Result Date: 12/8/2023  Interpreted By:  Sandro Hernandez, STUDY: US THORACENTESIS; 12/7/2023 3:16 pm   INDICATION: Signs/Symptoms:Left pleural effusion question of whether this needs thoracentesis, history of ESRD, just had dialysis today.   COMPARISON: None   ACCESSION NUMBER(S): [id number]   ORDERING CLINICIAN: JEZ LONG   TECHNIQUE: Informed consent obtained. Patient positionedsitting upright. Skin prepped, draped and anesthetized. Under ultrasound guidance, a centesis catheter/needle was advanced into rightpleural cavity.   FINDINGS: A total of 750 cc of clear yellow fluid was aspirated. A sample was sent for analysis. The patient tolerated the procedure well.       Ultrasound-guided left thoracentesis.     Signed by: Sandro Hernandez 12/8/2023 3:01 PM Dictation workstation:   [id number]    XR chest 1 view    Result Date: 12/8/2023  Interpreted By:  Maria Garcia, STUDY: XR CHEST 1 VIEW 12/8/2023 11:57 am   INDICATION: Signs/Symptoms:S/P thoracentesis   COMPARISON: 12/06/2023   ACCESSION NUMBER(S): [id number]   ORDERING CLINICIAN: JEZ LONG   TECHNIQUE: AP erect view of the chest at bedside   FINDINGS: There is an interval decrease in size of left pleural effusion when compared with the study done 2 days earlier. No  pneumothorax is seen.   There is some residual left pleural effusion with infiltrates seen in the left mid and lower lung field. The right lung is clear.   There are surgical clips within the left axilla. Stent grafts are visible in the left subclavian region extending into the left axillary region. A tunneled right jugular dialysis catheter terminates within right atrium.       Decreased size of left effusion following ultrasound-guided thoracentesis with no pneumothorax.   There is some residual left pleural effusion noted with infiltrate in the left mid and lower lung field.   Signed by: Maria Garcia 12/8/2023 12:51 PM Dictation workstation:   [id number]    XR foot 3+ views bilateral    Result Date: 12/7/2023  Interpreted By:  Brendon Hook, STUDY: XR FOOT 3+ VIEWS BILATERAL; 12/7/2023 3:31 pm   INDICATION: Signs/Symptoms:Wound/Abscess/Acute neha process rule out/Comparative Exam.   COMPARISON: None   ACCESSION NUMBER(S): [id number]   ORDERING CLINICIAN: JOSSELYN STAHL   TECHNIQUE: Nonweightbearing AP, lateral and oblique views of the bilateral foot were performed.   FINDINGS: Right foot: Large heel spur is seen. Mild Achilles enthesopathy demonstrated. Moderate atherosclerotic disease is seen. There is suggestion of loss of the arch on this nonweightbearing view. Degenerative change anterior plafond demonstrated and dorsal talus as well as posterior plafond and dorsal talus. Sclerosis anterior calcaneus is seen. Degenerative changes mild between the midfoot and forefoot. 1st metatarsophalangeal joint degenerative changes demonstrated. 2nd ray absent phalanges. Distal phalanges of the 3rd through 5th rays with overlap were difficult to visualize. Distal phalanx of the great toe is difficult to visualize.   Left foot: Large heel spur is seen. Advanced atherosclerotic disease demonstrated. Degenerative change in the calcaneus and talus seen with moderate anterior and mild posterior plafond degenerative change.  Mild degenerative change in the midfoot and forefoot is seen. Distal phalanx of the great toe is absent. Degenerative change of the 2nd ray phalanges and 3rd ray phalanges seen with degenerative change of the 4th and 5th digits not well seen.       1. Right foot: Large heel spur. Atherosclerotic disease. Suggestion of diffuse osteopenia. No sclerosis to suggest osteomyelitis. No subcutaneous air to suggest gangrene. Absent flanges 2nd ray with difficult to visualized distal phalanges of the other digits.   2. Left foot: Large heel spur. Degenerative changes of the hindfoot midfoot and forefoot with atherosclerotic disease. Absent distal phalanx of the great toe. No periostitis or abnormal sclerosis with diffuse osteopenia to suggest osteomyelitis. No subcutaneous air to suggest gangrene.       Signed by: Brendon Hook 12/7/2023 8:39 PM Dictation workstation:   [id number]    ECG 12 Lead    Result Date: 12/7/2023   Poor data quality, interpretation may be adversely affected Normal sinus rhythm Right bundle branch block Septal infarct , age undetermined Possible Lateral infarct , age undetermined Abnormal ECG No previous ECGs available Confirmed by Hammad Callahan ([id number]) on 12/7/2023 1:07:11 PM    XR chest 1 view    Result Date: 12/6/2023  Interpreted By:  Roger Murdock, STUDY: XR CHEST 1 VIEW;  12/6/2023 2:08 pm   INDICATION: Signs/Symptoms:Brief AMS and hypotension, now resolved.   COMPARISON: 08/21/2023..   ACCESSION NUMBER(S): [id number]   ORDERING CLINICIAN: PATEL TINSLEY   FINDINGS: New moderate left pleural effusion. There is silhouetting of the left heart border. Left subclavian stent present. Right-sided double-lumen central venous catheter terminates within the right ventricle. No acute osseous findings.       New, moderate left pleural effusion.     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

## 2023-12-30 NOTE — PROGRESS NOTES
Ayanna Gross is a 73 y.o. female on day 24 of admission presenting with Hypotension, unspecified hypotension type.      Subjective     Patient denied chest pain or shortness of breath.  Patient is off of Levophed.  Systolic blood pressure in the low 80s  She has been afebrile during the night.    Objective     Last Recorded Vitals  BP (!) 88/44   Pulse 87   Temp 36.3 °C (97.3 °F) (Temporal)   Resp 22   Wt 67.2 kg (148 lb 2.4 oz)   SpO2 99%   Intake/Output last 3 Shifts:    Intake/Output Summary (Last 24 hours) at 12/30/2023 0802  Last data filed at 12/29/2023 1738  Gross per 24 hour   Intake 1600 ml   Output 2800 ml   Net -1200 ml         Admission Weight  Weight: 72.6 kg (160 lb) (12/06/23 1301)    Daily Weight  12/29/23 : 67.2 kg (148 lb 2.4 oz)    Image Results      Physical Exam  General: Pleasant, cooperating during physical exam  HEENT: Pupils are equal and reactive to light and commendation , oral mucosa moist, no JVD .  Cardiovascular: Normal sinus rhythm, no MRG.  Lungs: Clear to auscultation bilaterally, no wheezing, no crackles, no dullness to percussion.  Abdomen: No hepatosplenomegaly appreciated, soft , not tender, positive bowel sounds, positive bowel movement.  Neuro: Alert and oriented x2, strength in upper and lower extremities , sensation intact.  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 difficile colitis.  History of chronic MRSA bacteremia, infective endocarditis.  Continue with doxycycline.  Patient completed full course of IV vancomycin  Treated with p.o. vancomycin  On her baseline patient has low blood  pressure.  Dr. Mason  on the case    End stage renal disease   On dialysis  Dr. Mondragon   on  the  case  ID on the case  Patient completed full course of fidaxomicin     Anemia of chronic disease  Positive for cold agglutinins  Patient was advised to follow-up with her primary care physician.  Consult Dr. Leonard from hematology oncology services  Evaluated by hematology oncology monitor close.    Pleural effusion  Status post thoracocentesis  Patient was found to have transudative pleural effusion  Pulmonary on the case    COVID-19 infection  Patient is not hypoxic.  Continue with contact isolation and droplet precaution.    Hypotension  Patient has been on a low-dose of pressor for many days  Pressure discontinued yesterday.  Blood pressure in the low 80s  Discussed in detail with Dr. Bob, nephrology on the case.  On baseline patient has low blood pressure.  If she remain stable patient will be transferred to stepdown unit    Peripheral vascular disease   heel ulcers  Evaluated by podiatry  Follow-up with Jimenez Landry as outpatient     encephalopathy.  Clinically improved.    Patient is at high risk for deterioration-titrate arrhythmias.    Diabetes mellitus type II.  cover with insulin sliding scale    Continue with midodrine  Waiting for consult to see her today.  Continue with contact isolation droplet precaution.  CBC and BMP in AM.  Discussed with patient at bedside.    Principal Problem:    Hypotension, unspecified hypotension type  Active Problems:    Paroxysmal atrial fibrillation (CMS/HCC)    Peripheral vascular disease (CMS/HCC)    Orthostatic hypotension    Mixed hyperlipidemia    End-stage renal disease on hemodialysis (CMS/HCC)    DM (diabetes mellitus) (CMS/HCC)    Absent pedal pulses      Addendum  Discussed in detail with her daughter present in the room.            Veronica Yepez MD

## 2023-12-30 NOTE — PROGRESS NOTES
Critical Care Progress Note    Ayanna Gross is a 73 y.o. female on day 24 of admission presenting with Hypotension, unspecified hypotension type.    Subjective   Chart review  Off pressors  On room air    Objective   Vital Signs      12/30/2023     1:00 PM 12/30/2023     1:30 PM 12/30/2023     2:00 PM 12/30/2023     2:30 PM 12/30/2023     3:00 PM 12/30/2023     3:30 PM 12/30/2023     4:00 PM   Vitals   Heart Rate 97 100 95 98 98 93 97   Resp 21 20 21 24 18 24 21       Oxygen Therapy  SpO2: 99 %  Medical Gas Therapy: None (Room air)  O2 Delivery Method: Nasal cannula         Intake/Output previous 24 hours:    Intake/Output Summary (Last 24 hours) at 12/30/2023 1623  Last data filed at 12/30/2023 1145  Gross per 24 hour   Intake 840 ml   Output 1400 ml   Net -560 ml       Vitals show blood pressure in the 80s and 90s systolic           Lines and Tubes:  CVC Double lumen Tunneled Right Subclavian (Active)   Earliest Known Present: 12/06/23   Lumen Type: Double lumen  CVC Type: Tunneled  Description (optional): Permacath  Orientation: Right  Location: Subclavian   Number of days: 20       CVC 12/20/23 Triple lumen Non-tunneled Left Femoral (Active)   Placement Date/Time: 12/20/23 2233   Hand Hygiene Performed Prior to CVC Insertion: Yes  Site Prep: Chlorhexidine   Site Prep Agent has Completely Dried Before Insertion: Yes  All 5 Sterile Barriers Used (Gloves, Gown, Cap, Mask, Large Sterile Drape):...   Number of days: 5       Peripheral IV 12/21/23 22 G Right Forearm (Active)   Placement Date/Time: 12/21/23 0130   Size (Gauge): 22 G  Orientation: Right  Location: Forearm  Site Prep: Alcohol   Number of days: 5       Arterial Line 12/16/23 Right Femoral (Active)   Placement Date/Time: 12/16/23 (c) 1755   Size: 18 G  Orientation: Right  Location: Femoral  Site Prep: Chlorhexidine   Local Anesthetic: Injectable  Insertion attempts: 1  Securement Method: Transparent dressing   Number of days: 9       Rectal Tube With  balloon (Active)   Placement Date/Time: 12/19/23 1300   Type: (c) With balloon   Number of days: 6         Scheduled medications  apixaban, 2.5 mg, oral, BID  cholestyramine, 4 g, oral, Daily with evening meal  collagenase, , Topical, Daily  doxycycline, 100 mg, oral, Daily  epoetin di or biosimilar, 10,000 Units, intravenous, Every Mon/Wed/Fri  escitalopram, 10 mg, oral, Daily  febuxostat, 40 mg, oral, Every other day  fenofibrate, 160 mg, oral, Daily  gabapentin, 100 mg, oral, BID  heparin, 2,000 Units, intra-catheter, After Dialysis  heparin, 2,000 Units, intra-catheter, After Dialysis  heparin, 2,000 Units, intra-catheter, After Dialysis  heparin, 2,000 Units, intra-catheter, After Dialysis  hydrocortisone sodium succinate, 100 mg, intravenous, q8h GULSHAN  insulin lispro, 0-5 Units, subcutaneous, TID with meals  ipratropium-albuteroL, 3 mL, nebulization, TID  [Held by provider] metoprolol succinate XL, 12.5 mg, oral, Daily  midodrine, 15 mg, oral, TID with meals  nystatin, 1 Application, Topical, BID  simvastatin, 40 mg, oral, Nightly  vancomycin, 125 mg, oral, 4x daily      Continuous medications  norepinephrine, 0.01-3 mcg/kg/min, Last Rate: Stopped (12/29/23 1800)      PRN medications  PRN medications: acetaminophen, acetaminophen, dextrose 10 % in water (D10W), dextrose, glucagon, HYDROcodone-acetaminophen, oxygen, polyethylene glycol, zinc oxide    Relevant Results  Results from last 7 days   Lab Units 12/30/23  0443 12/29/23  0443 12/28/23  0405   WBC AUTO x10*3/uL 8.5 10.3 11.3   HEMOGLOBIN g/dL 9.6* 8.4* 9.2*   HEMATOCRIT % 28.6* 25.4* 26.8*   PLATELETS AUTO x10*3/uL 231 229 226      Results from last 7 days   Lab Units 12/30/23 0443 12/29/23  0443 12/28/23  0405   SODIUM mmol/L 134 131* 130*   POTASSIUM mmol/L 3.9 4.2 5.0   CHLORIDE mmol/L 97 98 96*   CO2 mmol/L 24 24 23*   BUN mg/dL 15 25 18   CREATININE mg/dL 2.70* 4.20* 3.10*   GLUCOSE mg/dL 225* 121* 159*   CALCIUM mg/dL 8.2* 8.2* 8.3*          XR  chest 1 view 12/19/2023    Narrative  Interpreted By:  Judd Lee,  STUDY:  XR CHEST 1 VIEW; 12/19/2023 5:44 am    INDICATION:  Signs/Symptoms:pleural effusion.    COMPARISON:  December 14, 2023    ACCESSION NUMBER(S):  [id number]    ORDERING CLINICIAN:  PARIS LUCAS    FINDINGS:  RESULT: Right sided central venous catheter is again noted extending  into the right atrium. The distal tip is not well visualized.  Left-sided vascular stents are noted in the left subclavian region.  Left axillary surgical clips are noted. The cardiac silhouette is  within normal limits for size. Mediastinal contours are unremarkable.  Left basilar opacity obscures the left hemidiaphragm with hazy  opacity throughout the left lung. There are degenerative changes of  the thoracic spine and shoulders.    Impression  Persistent left basilar opacity obscures the left hemidiaphragm with  hazy opacity extending through the left hemithorax. Findings likely  represent pleural effusion atelectasis. Underlying infiltrate or mass  is not excluded. Overall findings not significantly changed compared  to previous exam      Signed by: Judd Lee 12/19/2023 9:02 AM  Dictation workstation:   [id number]      Patient Active Problem List   Diagnosis    Amputation of finger, except thumb    Tenosynovitis    Inflammatory dermatosis    Skin abnormality    Dry skin    Arthritis    Syncope    Swelling of finger    Sinusitis, acute    Bronchitis, acute    Second degree burn of single finger of left hand, not thumb    Rash    Postmenopausal bleeding    Pneumonia    Infection    Phlegmon    Paroxysmal atrial fibrillation (CMS/HCC)    Right arm pain    Pain in right shoulder    Skin changes due to chronic exposure to nonionizing radiation, unspecified    Other seborrheic keratosis    Inflamed seborrheic keratosis    Actinic keratosis    Open wound of finger    Obstructive sleep apnea    Obesity with body mass index 30 or greater    Nuclear senile  cataract    Nonhealing surgical wound    Neoplasm of uncertain behavior of skin    Miosis    Mild nonproliferative diabetic retinopathy (CMS/HCC)    Lactic acidosis    Injury of head    Infection due to Escherichia coli    Hyponatremia    Peripheral vascular disease (CMS/HCC)    Orthostatic hypotension    Low blood pressure    Hypertension    History of breast cancer in female    Personal history of other malignant neoplasm of skin    High cholesterol    Mixed hyperlipidemia    Heart failure (CMS/HCC)    Headache    Growing pains    Gout    Chronic gout with tophus    Gastrointestinal hemorrhage    Fecal incontinence    Fatigue    Fall    Facial basal cell cancer    End-stage renal disease on hemodialysis (CMS/HCC)    Anemia of chronic renal failure    End stage renal disease (CMS/HCC)    Difficulty walking    Dyslipidemia    Diabetic foot ulcer (CMS/HCC)    Dermatochalasis of right upper eyelid    Dermatochalasis of left upper eyelid    Mixed anxiety and depressive disorder    Depression    Dependence on renal dialysis (CMS/HCC)    Contusion of face    Clouded consciousness    Chronic pain of both knees    Chronic osteomyelitis (CMS/HCC)    Chronic kidney disease, stage 3 (CMS/HCC)    Chronic kidney disease (CKD), stage IV (severe) (CMS/HCC)    Chorioretinal scar    Cerebrovascular accident (CMS/HCC)    Breast cancer (CMS/HCC)    Arthritis of knee, right    Arthritis of knee, left    Anemia of renal disease    Altered mental status    Colon cancer screening    Cellulitis of finger    Loss of consciousness (CMS/HCC)    Tenosynovitis of fingers    Thyroid nodule    Tinea corporis    Toxic metabolic encephalopathy    DM (diabetes mellitus) (CMS/HCC)    Hypoglycemia    Type 2 diabetes mellitus (CMS/HCC)    Visual hallucinations    Vocal cord paralysis    Wound infection    Hypotension, unspecified hypotension type    Absent pedal pulses     Assessment/Plan   Septic shock  COVID-19 infection  C. difficile  colitis  End-stage renal disease  Possible endocarditis  Transudative Pleural effusion, s/p thoracentesis  History of chronic MRSA bacteremia, infective endocarditis-completed IV vancomycin, now on Doxycycline   Severe malnutrition      Off pressors  Dialysis per renal.  Oral doxycycline for history of MRSA.  Continue Dificid  Wound care  Minimum systolic pressure of 70 acceptable per nephrology.  Continue to attempt to wean off Levophed  Continue midodrine  Wean oxygen as tolerated  Left femoral triple-lumen catheter.  White blood cell count is improving.  However I have continued concern/cannot rule out that there could be could be some infection related issues affecting her blood pressure.  Feel ideally we should remove this catheter for an IJ site.     Matthew Engle MD  [facility]       Disclaimer: Parts of this chart were dictated with voice recognition software. Please excuse any errors of grammar, spelling, or transcription which are not corrected. Please contact me with any questions regarding documentation.

## 2023-12-30 NOTE — CARE PLAN
Problem: Diabetes  Goal: Achieve decreasing blood glucose levels by end of shift  Outcome: Progressing  Flowsheets (Taken 12/29/2023 1956)  Achieve decreasing blood glucose levels by end of shift:   Med administration/monitoring of effect   Self monitor blood glucose with staff oversight  Goal: Increase stability of blood glucose readings by end of shift  Outcome: Progressing  Flowsheets (Taken 12/29/2023 1956)  Increase stability of blood glucose readings by end of shift: Med administration/monitoring of effect  Goal: Maintain electrolyte levels within acceptable range throughout shift  Outcome: Progressing  Flowsheets (Taken 12/29/2023 1956)  Maintain electrolyte levels within acceptable range throughout shift:   Med administration/monitoring of effect   Monitor urine output  Goal: Maintain glucose levels >70mg/dl to <250mg/dl throughout shift  Outcome: Progressing  Flowsheets (Taken 12/29/2023 1956)  Maintain glucose levels >70mg/dl to <250mg/dl throughout shift:   Med administration/monitoring of effect   Self monitor blood glucose with staff oversight  Goal: No changes in neurological exam by end of shift  Outcome: Progressing  Flowsheets (Taken 12/29/2023 1956)  No changes in neurological exam by end of shift: Complete frequent neurological assessments  Goal: Learn about and adhere to nutrition recommendations by end of shift  Outcome: Progressing  Flowsheets (Taken 12/29/2023 1956)  Learn about and adhere to nutrition recommendations by end of shift: Ensure/encourage compliance with appropriate diet  Goal: Vital signs within normal range for age by end of shift  Outcome: Progressing  Flowsheets (Taken 12/29/2023 1956)  Vital signs within normal range for age by end of shift: Med administration/monitoring of effect  Goal: Increase self care and/or family involovement by end of shift  Outcome: Progressing  Note: Monitor glucose  Goal: Receive DSME education by end of shift  Outcome: Progressing  Flowsheets  (Taken 12/29/2023 1956)  Receive DSME education by end of shift: Provide patient centered education on Diabetic Self Management Education     Problem: Pain - Adult  Goal: Verbalizes/displays adequate comfort level or baseline comfort level  Outcome: Progressing  Flowsheets (Taken 12/29/2023 1956)  Verbalizes/displays adequate comfort level or baseline comfort level:   Encourage patient to monitor pain and request assistance   Assess pain using appropriate pain scale   Administer analgesics based on type and severity of pain and evaluate response   Implement non-pharmacological measures as appropriate and evaluate response   Consider cultural and social influences on pain and pain management   Notify Licensed Independent Practitioner if interventions unsuccessful or patient reports new pain     Problem: Safety - Adult  Goal: Free from fall injury  Outcome: Progressing  Flowsheets (Taken 12/29/2023 1956)  Free from fall injury:   Instruct family/caregiver on patient safety   Based on caregiver fall risk screen, instruct family/caregiver to ask for assistance with transferring infant if caregiver noted to have fall risk factors     Problem: Discharge Planning  Goal: Discharge to home or other facility with appropriate resources  Outcome: Progressing  Flowsheets (Taken 12/29/2023 1956)  Discharge to home or other facility with appropriate resources:   Identify barriers to discharge with patient and caregiver   Identify discharge learning needs (meds, wound care, etc)   Refer to discharge planning if patient needs post-hospital services based on physician order or complex needs related to functional status, cognitive ability or social support system     Problem: Chronic Conditions and Co-morbidities  Goal: Patient's chronic conditions and co-morbidity symptoms are monitored and maintained or improved  Outcome: Progressing  Flowsheets (Taken 12/29/2023 1956)  Care Plan - Patient's Chronic Conditions and Co-Morbidity  Symptoms are Monitored and Maintained or Improved:   Monitor and assess patient's chronic conditions and comorbid symptoms for stability, deterioration, or improvement   Update acute care plan with appropriate goals if chronic or comorbid symptoms are exacerbated and prevent overall improvement and discharge   Collaborate with multidisciplinary team to address chronic and comorbid conditions and prevent exacerbation or deterioration     Problem: Skin  Goal: Decreased wound size/increased tissue granulation at next dressing change  Outcome: Progressing  Flowsheets (Taken 12/29/2023 1956)  Decreased wound size/increased tissue granulation at next dressing change:   Promote sleep for wound healing   Utilize specialty bed per algorithm   Protective dressings over bony prominences  Goal: Participates in plan/prevention/treatment measures  Outcome: Progressing  Flowsheets (Taken 12/29/2023 1956)  Participates in plan/prevention/treatment measures:   Discuss with provider PT/OT consult   Elevate heels   Increase activity/out of bed for meals  Goal: Prevent/manage excess moisture  Outcome: Progressing  Flowsheets (Taken 12/29/2023 1956)  Prevent/manage excess moisture:   Cleanse incontinence/protect with barrier cream   Moisturize dry skin   Use wicking fabric (obtain order)   Follow provider orders for dressing changes   Monitor for/manage infection if present  Goal: Prevent/minimize sheer/friction injuries  Outcome: Progressing  Flowsheets (Taken 12/29/2023 1956)  Prevent/minimize sheer/friction injuries:   Complete micro-shifts as needed if patient unable. Adjust patient position to relieve pressure points, not a full turn   HOB 30 degrees or less   Increase activity/out of bed for meals   Turn/reposition every 2 hours/use positioning/transfer devices   Use pull sheet   Utilize specialty bed per algorithm  Goal: Promote/optimize nutrition  Outcome: Progressing  Flowsheets (Taken 12/29/2023 1956)  Promote/optimize  nutrition: Monitor/record intake including meals  Goal: Promote skin healing  Outcome: Progressing  Flowsheets (Taken 12/29/2023 1956)  Promote skin healing:   Assess skin/pad under line(s)/device(s)   Ensure correct size (line/device) and apply per  instructions   Protective dressings over bony prominences   Rotate device position/do not position patient on device   Turn/reposition every 2 hours/use positioning/transfer devices     Problem: Fall/Injury  Goal: Not fall by end of shift  Outcome: Progressing  Goal: Be free from injury by end of the shift  Outcome: Progressing  Goal: Verbalize understanding of personal risk factors for fall in the hospital  Outcome: Progressing  Goal: Verbalize understanding of risk factor reduction measures to prevent injury from fall in the home  Outcome: Progressing  Goal: Pace activities to prevent fatigue by end of the shift  Outcome: Progressing     Problem: Nutrition  Goal: Oral intake greater than 50%  Outcome: Progressing  Goal: Oral intake greater 75%  Outcome: Progressing  Goal: Consume prescribed supplement  Outcome: Progressing  Goal: Adequate PO fluid intake  Outcome: Progressing  Goal: Nutrition support goals are met within 48 hrs  Outcome: Progressing  Goal: Nutrition support is meeting 75% of nutrient needs  Outcome: Progressing  Goal: Lab values WNL  Outcome: Progressing  Goal: Electrolytes WNL  Outcome: Progressing  Goal: Promote healing  Outcome: Progressing  Goal: Maintain stable weight  Outcome: Progressing  Goal: Reduce weight from edema/fluid  Outcome: Progressing  Goal: Gradual weight gain  Outcome: Progressing     Problem: Respiratory  Goal: Clear secretions with interventions this shift  Outcome: Progressing  Flowsheets (Taken 12/29/2023 1956)  Clear secretions with interventions this shift:   Encourage/provide pulmonary hygiene/secretion clearance   Incentive spirometry   Med administration/monitoring of effect  Goal: Minimize anxiety/maximize  coping throughout shift  Outcome: Progressing  Flowsheets (Taken 12/18/2023 1111 by Peri Martin, RN)  Minimize anxiety/maximize coping throughout shift: Med administration/monitoring of effect  Goal: Minimal/no exertional discomfort or dyspnea this shift  Outcome: Progressing  Flowsheets (Taken 12/29/2023 1956)  Minimal/no exertional discomfort or dyspnea this shift: Positioning to promote ventilation/comfort  Goal: No signs of respiratory distress (eg. Use of accessory muscles. Peds grunting)  Outcome: Progressing  Goal: Patent airway maintained this shift  Outcome: Progressing  Goal: Verbalize decreased shortness of breath this shift  Outcome: Progressing  Flowsheets (Taken 12/29/2023 1956)  Verbalize decreased shortness of breath this shift:   Encourage/provide pulmonary hygiene/secretion clearance   Incentive spirometry  Goal: Wean oxygen to maintain O2 saturation per order/standard this shift  Outcome: Progressing  Flowsheets (Taken 12/29/2023 1956)  Wean oxygen to maintain O2 saturation per order/standard this shift:   Encourage activity/mobility   Incentive spirometry     Problem: Pain  Goal: My pain/discomfort is manageable  Outcome: Progressing  Flowsheets (Taken 12/29/2023 1956)  Resident's pain/discomfort is manageable:   Identify and avoid pain triggers   Administer pain medication prior to activities that may trigger pain   Offer non-pharmacological pain management interventions   Include resident/family/caregiver in decisions related to pain management     Problem: Safety  Goal: Patient will be injury free during hospitalization  Outcome: Progressing  Goal: I will remain free of falls  Outcome: Progressing  Flowsheets (Taken 12/18/2023 1111 by Peri Martin, RN)  Resident will remain free of falls:   Assist with toileting as orderd   Maintain bed at position as ordered (chair height, low bed)   Consult with physical therapy as needed     Problem: Daily Care  Goal: Daily care needs are  met  Outcome: Progressing  Flowsheets (Taken 12/18/2023 1111 by Peri Martin RN)  Daily care needs are met:   Assess and monitor ability to perform self care and identify potential discharge needs   Assess skin integrity/risk for skin breakdown   Provide mouth care   Encourage independent activity per ability   Assist patient with activities of daily living as needed   Include patient/family/caregiver in decisions related to daily care     Problem: Psychosocial Needs  Goal: Demonstrates ability to cope with hospitalization/illness  Outcome: Progressing  Flowsheets (Taken 12/18/2023 1111 by Peri Martin RN)  Demonstrates ability to cope with hospitalization/illness:   Encourage verbalization of feelings/concerns/expectations   Provide low-stimulation environment as needed   Assist resident to identify and practice own strengths and abilities   Encourage participation in diversional activities   Include resident/family/caregiver in decisions related to psychosocial needs  Goal: Collaborate with me, my family, and caregiver to identify my specific goals  Outcome: Progressing  Flowsheets  Taken 12/29/2023 1956 by Sadaf Burch RN  Is there anything else we need to know to provide the best care possible?: na  Taken 12/11/2023 1100 by Trey Jefferson RN  Cultural Requests During Hospitalization: NA  Spiritual Requests During Hospitalization: NA     Problem: Discharge Barriers  Goal: My discharge needs are met  Outcome: Progressing  Flowsheets (Taken 12/29/2023 1956)  Resident's discharge needs are met:   Identify potential discharge barriers on admission and throughout stay   Involve resident/family/caregiver in discharge planning process     Problem: Pain  Goal: Takes deep breaths with improved pain control throughout the shift  Outcome: Progressing  Goal: Turns in bed with improved pain control throughout the shift  Outcome: Progressing  Goal: Walks with improved pain control throughout the shift  Outcome:  Progressing  Goal: Performs ADL's with improved pain control throughout shift  Outcome: Progressing  Goal: Participates in PT with improved pain control throughout the shift  Outcome: Progressing  Goal: Free from opioid side effects throughout the shift  Outcome: Progressing  Goal: Free from acute confusion related to pain meds throughout the shift  Outcome: Progressing   The patient's goals for the shift include rest    The clinical goals for the shift include Hemodynamically stable    Over the shift, the patient did not make progress toward the following goals. Barriers to progression include . Recommendations to address these barriers include .

## 2023-12-31 NOTE — CARE PLAN
The patient's goals for the shift include maintain comfort    The clinical goals for the shift include remain hemodynamically stable off pressors      Problem: Diabetes  Goal: Achieve decreasing blood glucose levels by end of shift  Outcome: Progressing  Goal: Increase stability of blood glucose readings by end of shift  Outcome: Progressing  Goal: Maintain electrolyte levels within acceptable range throughout shift  Outcome: Progressing  Goal: Maintain glucose levels >70mg/dl to <250mg/dl throughout shift  Outcome: Progressing  Goal: No changes in neurological exam by end of shift  Outcome: Progressing  Goal: Learn about and adhere to nutrition recommendations by end of shift  Outcome: Progressing  Goal: Vital signs within normal range for age by end of shift  Outcome: Progressing  Goal: Increase self care and/or family involovement by end of shift  Outcome: Progressing  Goal: Receive DSME education by end of shift  Outcome: Progressing     Problem: Pain - Adult  Goal: Verbalizes/displays adequate comfort level or baseline comfort level  Outcome: Progressing     Problem: Safety - Adult  Goal: Free from fall injury  Outcome: Progressing     Problem: Discharge Planning  Goal: Discharge to home or other facility with appropriate resources  Outcome: Progressing     Problem: Chronic Conditions and Co-morbidities  Goal: Patient's chronic conditions and co-morbidity symptoms are monitored and maintained or improved  Outcome: Progressing     Problem: Skin  Goal: Decreased wound size/increased tissue granulation at next dressing change  Outcome: Progressing  Flowsheets (Taken 12/30/2023 2047)  Decreased wound size/increased tissue granulation at next dressing change:   Promote sleep for wound healing   Protective dressings over bony prominences  Goal: Participates in plan/prevention/treatment measures  Outcome: Progressing  Flowsheets (Taken 12/30/2023 2047)  Participates in plan/prevention/treatment measures: Elevate  heels  Goal: Prevent/manage excess moisture  Outcome: Progressing  Flowsheets (Taken 12/30/2023 2047)  Prevent/manage excess moisture:   Cleanse incontinence/protect with barrier cream   Follow provider orders for dressing changes   Monitor for/manage infection if present  Goal: Prevent/minimize sheer/friction injuries  Outcome: Progressing  Flowsheets (Taken 12/30/2023 2047)  Prevent/minimize sheer/friction injuries:   Complete micro-shifts as needed if patient unable. Adjust patient position to relieve pressure points, not a full turn   Increase activity/out of bed for meals   Turn/reposition every 2 hours/use positioning/transfer devices   Use pull sheet  Goal: Promote/optimize nutrition  Outcome: Progressing  Flowsheets (Taken 12/30/2023 2047)  Promote/optimize nutrition:   Consume > 50% meals/supplements   Monitor/record intake including meals   Offer water/supplements/favorite foods  Goal: Promote skin healing  Outcome: Progressing  Flowsheets (Taken 12/30/2023 2047)  Promote skin healing:   Turn/reposition every 2 hours/use positioning/transfer devices   Protective dressings over bony prominences   Rotate device position/do not position patient on device   Assess skin/pad under line(s)/device(s)     Problem: Fall/Injury  Goal: Not fall by end of shift  Outcome: Progressing  Goal: Be free from injury by end of the shift  Outcome: Progressing  Goal: Verbalize understanding of personal risk factors for fall in the hospital  Outcome: Progressing  Goal: Verbalize understanding of risk factor reduction measures to prevent injury from fall in the home  Outcome: Progressing  Goal: Pace activities to prevent fatigue by end of the shift  Outcome: Progressing     Problem: Nutrition  Goal: Oral intake greater than 50%  Outcome: Progressing  Goal: Oral intake greater 75%  Outcome: Progressing  Goal: Consume prescribed supplement  Outcome: Progressing  Goal: Adequate PO fluid intake  Outcome: Progressing  Goal: Nutrition  support goals are met within 48 hrs  Outcome: Progressing  Goal: Nutrition support is meeting 75% of nutrient needs  Outcome: Progressing  Goal: Lab values WNL  Outcome: Progressing  Goal: Electrolytes WNL  Outcome: Progressing  Goal: Promote healing  Outcome: Progressing  Goal: Maintain stable weight  Outcome: Progressing  Goal: Reduce weight from edema/fluid  Outcome: Progressing  Goal: Gradual weight gain  Outcome: Progressing     Problem: Respiratory  Goal: Clear secretions with interventions this shift  Outcome: Progressing  Goal: Minimize anxiety/maximize coping throughout shift  Outcome: Progressing  Goal: Minimal/no exertional discomfort or dyspnea this shift  Outcome: Progressing  Goal: No signs of respiratory distress (eg. Use of accessory muscles. Peds grunting)  Outcome: Progressing  Goal: Patent airway maintained this shift  Outcome: Progressing  Goal: Verbalize decreased shortness of breath this shift  Outcome: Progressing     Problem: Pain  Goal: My pain/discomfort is manageable  Outcome: Progressing     Problem: Safety  Goal: Patient will be injury free during hospitalization  Outcome: Progressing  Goal: I will remain free of falls  Outcome: Progressing     Problem: Daily Care  Goal: Daily care needs are met  Outcome: Progressing     Problem: Psychosocial Needs  Goal: Demonstrates ability to cope with hospitalization/illness  Outcome: Progressing  Goal: Collaborate with me, my family, and caregiver to identify my specific goals  Outcome: Progressing     Problem: Discharge Barriers  Goal: My discharge needs are met  Outcome: Progressing     Problem: Pain  Goal: Takes deep breaths with improved pain control throughout the shift  Outcome: Progressing  Goal: Turns in bed with improved pain control throughout the shift  Outcome: Progressing  Goal: Walks with improved pain control throughout the shift  Outcome: Progressing  Goal: Performs ADL's with improved pain control throughout shift  Outcome:  Progressing  Goal: Participates in PT with improved pain control throughout the shift  Outcome: Progressing

## 2023-12-31 NOTE — PROCEDURES
Pt in ICU on contact and droplet precautions.   Report received from FLAQUITO Martin. Patient is no longer on pressors, BP has been in 70's systolic.   Confirmed with Dr. Andrade via secure chat that pt is okay to run in 70's as long as she is mentating well.     Bed weight is 70.1 KG  Patient is on ROOM AIR  Prescribed Hemodialysis orders are verified in the machine.  Hemodialysis orders are for 1 Liters of fluid removal.      Patient has a   Dressing is CLEAN DRY AND INTACT, changed today, timed/dated/initialed.  No visible S/S of infection noted.  Patient denies pain near insertion site.     CVC accessed with aseptic technique.   Caps removed.  Arterial and venous lumens aspirated without difficulty.  Patency of both lumens checked with 2 (10) ml normal saline flushes.     Treatment initiated @ 1357  Approximate Completion time is 1757    HD complete @ 1759. Net fluid loss of 1000cc post dialysis.  All blood returned. Catheter ports flushed with NS, 1.6cc heparin dwell instilled per MAR, ports capped securely.  Pt with chronic hypotension, asymptomatic. BP 76/42 HR 91. Patient denies complaints. Remains in ICU bed with call light, bed locked and low.  Report given to FLAQUITO Martin.

## 2023-12-31 NOTE — PROGRESS NOTES
Ayanna Gross is a 73 y.o. female on day 25 of admission presenting with Hypotension, unspecified hypotension type.    Subjective   Interval History:    room not entered-limited patient observed through glass window  Patient discussed with nurse  Still having large amount of loose stools  Remains off pressors        Review of Systems    Objective   Range of Vitals (last 24 hours)  Heart Rate:  []   Temp:  [36 °C (96.8 °F)-36.4 °C (97.5 °F)]   Resp:  [9-29]   SpO2:  [92 %-100 %]   Daily Weight  12/29/23 : 67.2 kg (148 lb 2.4 oz)    Body mass index is 27.09 kg/m².    Physical Exam   awake, alert  Resting comfortably    Antibiotics  sodium chloride 0.9 % bolus 500 mL  cefepime (Maxipime) 1 g in dextrose 5 % 50 mL IV  vancomycin-diluent combo no.1 (Xellia) IVPB 1 g  apixaban (Eliquis) tablet 2.5 mg  calcium acetate (Phoslo) capsule 2,000 mg  escitalopram (Lexapro) tablet 10 mg  febuxostat (Uloric) tablet 40 mg  fenofibrate (Triglide) tablet 160 mg  gabapentin (Neurontin) capsule 300 mg  metoprolol succinate XL (Toprol-XL) 24 hr tablet 12.5 mg  midodrine (Proamatine) tablet 10 mg  nystatin (Mycostatin) 100,000 unit/gram powder 1 Application  B complex-vitamin C tablet 1 tablet  simvastatin (Zocor) tablet 40 mg  acetaminophen (Tylenol) tablet 650 mg  acetaminophen (Tylenol) tablet 650 mg  polyethylene glycol (Glycolax, Miralax) packet 17 g  dextrose 50 % injection 25 g  glucagon (Glucagen) injection 1 mg  dextrose 10 % in water (D10W) infusion  insulin lispro (HumaLOG) injection 0-5 Units  zinc oxide 20 % ointment 1 Application  calcium acetate (Phoslo) capsule 667 mg  vancomycin (Vancocin) capsule 125 mg  lidocaine PF (Xylocaine) 10 mg/mL (1 %) injection  heparin 1,000 unit/mL injection 2,000 Units  heparin 1,000 unit/mL injection 2,000 Units  midodrine (Proamatine) tablet 15 mg  albumin human 25 % solution 25 g  polyethylene glycol (Glycolax, Miralax) packet 17 g  collagenase 250 unit/gram ointment  vancomycin  (Vancocin) 1,750 mg in dextrose 5 % in water (D5W) 250 mL IV  vancomycin-diluent combo no.1 (Xellia) IVPB 1,750 mg  albumin human 25 % solution 12.5 g  heparin 1,000 unit/mL injection 2,000 Units  heparin 1,000 unit/mL injection 2,000 Units  vancomycin (Vancocin) placeholder  doxycycline (Vibramycin) capsule 100 mg  potassium chloride CR (Klor-Con) ER tablet 10 mEq  gabapentin (Neurontin) capsule 100 mg  heparin 1,000 unit/mL injection 2,000 Units  heparin 1,000 unit/mL injection 2,000 Units  piperacillin-tazobactam-dextrose (Zosyn) IV 2.25 g  doxycycline (Vibramycin) capsule 100 mg  ipratropium-albuteroL (Duo-Neb) 0.5-2.5 mg/3 mL nebulizer solution 3 mL  heparin 1,000 unit/mL injection 2,000 Units  heparin 1,000 unit/mL injection 2,000 Units  ipratropium-albuteroL (Duo-Neb) 0.5-2.5 mg/3 mL nebulizer solution 3 mL  dexAMETHasone (Decadron) injection 6 mg  albumin human 25 % solution 25 g  epoetin di-epbx (Retacrit) injection 10,000 Units  lidocaine PF (Xylocaine) 20 mg/mL (2 %) injection  potassium chloride CR (Klor-Con) ER tablet 10 mEq  albumin human 5 % infusion 25 g  potassium chloride CR (Klor-Con M20) ER tablet 20 mEq  norepinephrine (Levophed) 8 mg in dextrose 5% 250 mL (0.032 mg/mL) infusion (premix)  oxygen (O2) therapy  piperacillin-tazobactam-dextrose (Zosyn) IV 2.25 g  heparin 1,000 unit/mL injection 2,000 Units  heparin 1,000 unit/mL injection 2,000 Units  potassium chloride 20 mEq in 100 mL IV premix  fidaxomicin (Dificid) tablet 200 mg  norepinephrine (Levophed) 8 mg in dextrose 5% 250 mL (0.032 mg/mL) infusion (premix)  heparin 1,000 unit/mL injection 2,000 Units  heparin 1,000 unit/mL injection 2,000 Units  HYDROcodone-acetaminophen (Norco) 5-325 mg per tablet 1 tablet  dextrose 50 % injection 25 g  glucagon (Glucagen) injection 1 mg  dextrose 10 % in water (D10W) infusion  potassium chloride (Klor-Con) packet 20 mEq  potassium chloride 20 mEq in 100 mL IV premix  cosyntropin (Cortrosyn) injection  250 mcg  heparin 1,000 unit/mL injection 2,000 Units  heparin 1,000 unit/mL injection 2,000 Units  heparin 1,000 unit/mL injection 2,000 Units  heparin 1,000 unit/mL injection 2,000 Units  cholestyramine (Questran) 4 gram packet 4 g  potassium phosphate (monobasic) (K-Phos) tablet 500 mg  vancomycin (Vancocin) capsule 125 mg  heparin 1,000 unit/mL injection 2,000 Units  heparin 1,000 unit/mL injection 2,000 Units  hydrocortisone sod succ (PF) (Solu-CORTEF) injection 100 mg      Relevant Results  Labs  Results from last 72 hours   Lab Units 12/31/23 0400 12/30/23 0443 12/29/23 0443   WBC AUTO x10*3/uL 7.5 8.5 10.3   HEMOGLOBIN g/dL 9.7* 9.6* 8.4*   HEMATOCRIT % 28.6* 28.6* 25.4*   PLATELETS AUTO x10*3/uL 312 231 229   NEUTROS PCT AUTO % 74.5 75.9 55.6   LYMPHS PCT AUTO % 18.5 20.0 26.2   MONOS PCT AUTO % 6.0 2.2 11.1   EOS PCT AUTO % 0.1 0.0 4.6     Results from last 72 hours   Lab Units 12/31/23 0400 12/30/23 0443 12/29/23 0443   SODIUM mmol/L 130* 134 131*   POTASSIUM mmol/L 3.8 3.9 4.2   CHLORIDE mmol/L 94* 97 98   CO2 mmol/L 22* 24 24   BUN mg/dL 24 15 25   CREATININE mg/dL 3.40* 2.70* 4.20*   GLUCOSE mg/dL 269* 225* 121*   CALCIUM mg/dL 8.4* 8.2* 8.2*   ANION GAP mmol/L 14 13 9   EGFR mL/min/1.73m*2 14* 18* 11*   PHOSPHORUS mg/dL 3.0 2.9 2.8     Results from last 72 hours   Lab Units 12/31/23 0400 12/30/23 0443 12/29/23 0443   ALBUMIN g/dL 2.7* 2.5* 2.2*     Estimated Creatinine Clearance: 13.6 mL/min (A) (by C-G formula based on SCr of 3.4 mg/dL (H)).  C-Reactive Protein   Date Value Ref Range Status   12/25/2023 5.20 (H) 0.00 - 2.00 mg/dL Final     CRP   Date Value Ref Range Status   06/23/2023 19.9 (H) 0 - 2.0 MG/DL Final     Comment:     Performed at [address] [address]   05/22/2022 1.0 0 - 2.0 MG/DL Final     Comment:     Performed at [address] [address]     Microbiology   reviewed  Imaging  XR chest 1 view    Result Date:  12/26/2023  Interpreted By:  Rikki Patel, STUDY: XR CHEST 1 VIEW; 12/26/2023 12:23 pm   INDICATION: Signs/Symptoms:hypoxia   COMPARISON: 12/19/2023   ACCESSION NUMBER(S): [id number]   ORDERING CLINICIAN: BRITTANI TOLEDO   FINDINGS: The study is limited due to rotation. Dialysis catheter is stable in position. The cardiac silhouette is indeterminate due to the technique. There is interval significant worsening of large left effusion and left lung atelectasis/infiltrates, with complete opacification of the left hemithorax. There is no pneumothorax. Vascular stents are noted in subclavian vessels. Surgical staples are again seen in the left axilla. The osseous structures are unchanged.       Limited study. Interval worsening of large left pleural effusion and left lung infiltrates/atelectasis, with complete opacification of the left hemithorax.   Signed by: Rikki Patel 12/26/2023 12:40 PM Dictation workstation:   [id number]    US thoracentesis    Result Date: 12/20/2023  Interpreted By:  Todd La, STUDY: US THORACENTESIS;  12/15/2023 3:43 pm   INDICATION: Signs/Symptoms:L pleural effusion.   COMPARISON: Chest x-rayDated 12/14/2023.   ACCESSION NUMBER(S): [id number]   ORDERING CLINICIAN: PARIS LUCAS   TECHNIQUE: INTERVENTIONALIST(S): Todd La M.D.   CONSENT: The patient/patient's POA/next of kin was informed of the nature of the proposed procedure. The purposes, alternatives, risks, and benefits were explained and discussed. All questions were answered and consent was obtained.   SEDATION: None   MEDICATION/CONTRAST: No additional   TIME OUT: A time out was performed immediately prior to procedure start with the interventional team, correctly identifying the patient name, date of birth, MRN, procedure, anatomy (including marking of site and side), patient position, procedure consent form, relevant laboratory and imaging test results, antibiotic administration, safety precautions, and  procedure-specific equipment needs.   FINDINGS: The patient was placed in the sitting position.   The pleural space was examined with grey scale ultrasound, and the most accessible fluid identified and marked for thoracentesis.   The skin was prepped and draped in usual manner. Local anesthesia with Lidocaine was administered and a  left-sided thoracentesis was performed.  A 5 Dutch One-Step thoracentesis needle/catheter was then placed where marked.  Approximately 600 mL of yellowish colored fluid was removed.  The needle/catheter was then withdrawn.   The patient tolerated the procedure well and there were no immediate complications. Specimen(s) sent to the laboratory and pathology for further evaluation, per the requesting team.       Uneventful  left-sided thoracentesis, as detailed above.   I personally performed and/or directly supervised this study and was present for the entire procedure.   I personally reviewed the study and resident interpretation. I agree with the findings as stated.   Performed and dictated at [facility].   MACRO: None   Signed by: Todd La 12/20/2023 9:48 PM Dictation workstation:   [id number]    XR chest 1 view    Result Date: 12/19/2023  Interpreted By:  Judd Lee, STUDY: XR CHEST 1 VIEW; 12/19/2023 5:44 am   INDICATION: Signs/Symptoms:pleural effusion.   COMPARISON: December 14, 2023   ACCESSION NUMBER(S): [id number]   ORDERING CLINICIAN: PARIS LUCAS   FINDINGS: RESULT: Right sided central venous catheter is again noted extending into the right atrium. The distal tip is not well visualized. Left-sided vascular stents are noted in the left subclavian region. Left axillary surgical clips are noted. The cardiac silhouette is within normal limits for size. Mediastinal contours are unremarkable. Left basilar opacity obscures the left hemidiaphragm with hazy opacity throughout the left lung. There are degenerative changes of the  thoracic spine and shoulders.       Persistent left basilar opacity obscures the left hemidiaphragm with hazy opacity extending through the left hemithorax. Findings likely represent pleural effusion atelectasis. Underlying infiltrate or mass is not excluded. Overall findings not significantly changed compared to previous exam     Signed by: Judd Lee 12/19/2023 9:02 AM Dictation workstation:   [id number]    XR chest 1 view    Result Date: 12/14/2023  Interpreted By:  Judd Lee, STUDY: XR CHEST 1 VIEW; 12/14/2023 10:43 am   INDICATION: Hypoxic respiratory failure.   COMPARISON: December 8, 2023   ACCESSION NUMBER(S): [id number]   ORDERING CLINICIAN: RODRI SURESH   FINDINGS: RESULT: Right-sided double-lumen dialysis catheter is noted with distal tip at the level of the right atrium. Vascular stents are noted in the left subclavian region. Surgical clips are noted in the left axilla. Cardiac silhouette size is indeterminate as the left heart border is obscured. There are calcifications involving the thoracic aorta. Left basilar obscurity obscures the left hemidiaphragm and left heart border with hazy opacity extending to the left mid lung. There are degenerative changes of the bilateral shoulders.       Increasing density in the left mid and lower lung suggests increasing infiltrate or pleural effusion.     Signed by: Judd Lee 12/14/2023 10:58 AM Dictation workstation:   [id number]    Vascular US ankle brachial index (OCTAVIO) without exercise    Result Date: 12/13/2023           [place] [address]            Phone [phone]  Vascular Lab Report  [facility] US ANKLE BRACHIAL INDEX (OCTAVIO) WITHOUT EXERCISE Patient Name:      BASIA Gray Physician: [id number] Lizet Messer MD Study Date:        12/11/2023          Ordering Provider: [id number] MARIANN SUTTON MRN/PID:           [id number]             Fellow: Accession#:        [id number]        Technologist:      Luz Maria Trivedi RVT Date of Birth/Age: 1950 / 73 years Technologist 2:    Dara Parr RVT Gender:            F                   Encounter#:        [id number] Admission Status:  Inpatient           Location           [facility]                                        Performed:  Diagnosis/ICD: Other specified symptoms and signs involving the circulatory and                respiratory systems-R09.89 Indication:    Peripheral vascular disease CPT Codes:     94659.52 Peripheral artery OCTAVIO Only Reduced Service  Pertinent History: Absent pedal pulses. Patient is on dialysis.  CONCLUSIONS: Right Lower PVR: Evidence of moderate arterial occlusive disease in the right lower extremity at rest. Right pressures of >220 mmHg suggest no compressibility of vessels and may make absolute Segmental Limb Pressures (SLP) unreliable. Decreased digital perfusion noted. Biphasic flow is noted in the right dorsalis pedis artery. Left Lower PVR: Evidence of moderate arterial occlusive disease in the left lower extremity at rest. Left pressures of >220 mmHg suggest no compressibility of vessels and may make absolute Segmental Limb Pressures (SLP) unreliable. Decreased digital perfusion noted. Biphasic flow is noted in the left dorsalis pedis artery. Additional Findings: Technically difficult and limited exam due to patient's positioning, movement and inability to cooperate with exam.  Imaging & Doppler Findings:  RIGHT Lower PVR              Pressures Right Dorsalis Pedis (Ankle) 255 mmHg   LEFT Lower PVR              Pressures Left Dorsalis Pedis (Ankle) 255 mmHg   [id number] Lizet Messer MD Electronically signed by [id number] Lizet Messer MD on 12/13/2023 at 8:38:56 AM  ** Final **     CT head wo IV contrast    Result Date: 12/11/2023  Interpreted By:  Brendon Hook, STUDY: CT HEAD WO IV CONTRAST; 12/11/2023 5:20 pm   INDICATION: Signs/Symptoms:acute confusion.    COMPARISON: 20 August 2023   ACCESSION NUMBER(S): [id number]   ORDERING CLINICIAN: RODRI SURESH   TECHNIQUE: CT was performed with one or more of the following dose reduction techniques: automated exposure control, adjustment of the mA and/or kV according to patient size, or use of iterative reconstruction technique.       PROCEDURE: 3.0 mm axial images were obtained through the brain, to include the posterior fossa without intravenous contrast enhancement.   FINDINGS: The patient's head is turned to the left and slightly canted in the gantry. Mild motion artifact is seen. There is symmetric volume loss of the cerebral hemispheres. Moderate  decreased attenuation in the bilateral periventricular white matter, consistent with microangiopathy is noted.  There is no encephalomalacic change. Brainstem is unremarkable. Cerebellar hemispheres are symmetric. No subarachnoid, intraparenchymal, subdural or interventricular hemorrhage. No intra- or extra-axial mass or abnormal blood products are demonstrated. Hyperostosis frontalis interna is seen. Slight hypoplasia of the left mastoid.   The paranasal sinuses and mastoids as well as calvarium are unremarkable.       1. No acute intracranial findings. 2. Symmetric volume loss of the cerebral hemispheres. 3. Periventricular Microangiopathy. 4. No posttraumatic abnormality.   This report has been produced using speech recognition. This exam is available in DICOM format to non-affiliated healthcare facilities on a secure media free searchable basis with prior patient authorization. The patient exposure is reported to a radiation dose index registry. All CT examinations are performed with one or more of the following dose reduction techniques: Automated Exposure Control, Adjustment of mA and/or KV according to patient size, or use of iterative reconstruction techniques.   MACRO: None   Signed by: Brendon Hook 12/11/2023 7:05 PM Dictation workstation:   [id number]      thoracentesis    Result Date: 12/8/2023  Interpreted By:  Sandro Hernandez, STUDY: US THORACENTESIS; 12/7/2023 3:16 pm   INDICATION: Signs/Symptoms:Left pleural effusion question of whether this needs thoracentesis, history of ESRD, just had dialysis today.   COMPARISON: None   ACCESSION NUMBER(S): [id number]   ORDERING CLINICIAN: JEZ LONG   TECHNIQUE: Informed consent obtained. Patient positionedsitting upright. Skin prepped, draped and anesthetized. Under ultrasound guidance, a centesis catheter/needle was advanced into rightpleural cavity.   FINDINGS: A total of 750 cc of clear yellow fluid was aspirated. A sample was sent for analysis. The patient tolerated the procedure well.       Ultrasound-guided left thoracentesis.     Signed by: Sandro Hernandez 12/8/2023 3:01 PM Dictation workstation:   [id number]    XR chest 1 view    Result Date: 12/8/2023  Interpreted By:  Maria Garcia, STUDY: XR CHEST 1 VIEW 12/8/2023 11:57 am   INDICATION: Signs/Symptoms:S/P thoracentesis   COMPARISON: 12/06/2023   ACCESSION NUMBER(S): [id number]   ORDERING CLINICIAN: JEZ LONG   TECHNIQUE: AP erect view of the chest at bedside   FINDINGS: There is an interval decrease in size of left pleural effusion when compared with the study done 2 days earlier. No pneumothorax is seen.   There is some residual left pleural effusion with infiltrates seen in the left mid and lower lung field. The right lung is clear.   There are surgical clips within the left axilla. Stent grafts are visible in the left subclavian region extending into the left axillary region. A tunneled right jugular dialysis catheter terminates within right atrium.       Decreased size of left effusion following ultrasound-guided thoracentesis with no pneumothorax.   There is some residual left pleural effusion noted with infiltrate in the left mid and lower lung field.   Signed by: Maria Garcia 12/8/2023 12:51 PM Dictation workstation:   [id number]    XR foot  3+ views bilateral    Result Date: 12/7/2023  Interpreted By:  Brendon Hook, STUDY: XR FOOT 3+ VIEWS BILATERAL; 12/7/2023 3:31 pm   INDICATION: Signs/Symptoms:Wound/Abscess/Acute neha process rule out/Comparative Exam.   COMPARISON: None   ACCESSION NUMBER(S): [id number]   ORDERING CLINICIAN: JOSSELYN STAHL   TECHNIQUE: Nonweightbearing AP, lateral and oblique views of the bilateral foot were performed.   FINDINGS: Right foot: Large heel spur is seen. Mild Achilles enthesopathy demonstrated. Moderate atherosclerotic disease is seen. There is suggestion of loss of the arch on this nonweightbearing view. Degenerative change anterior plafond demonstrated and dorsal talus as well as posterior plafond and dorsal talus. Sclerosis anterior calcaneus is seen. Degenerative changes mild between the midfoot and forefoot. 1st metatarsophalangeal joint degenerative changes demonstrated. 2nd ray absent phalanges. Distal phalanges of the 3rd through 5th rays with overlap were difficult to visualize. Distal phalanx of the great toe is difficult to visualize.   Left foot: Large heel spur is seen. Advanced atherosclerotic disease demonstrated. Degenerative change in the calcaneus and talus seen with moderate anterior and mild posterior plafond degenerative change. Mild degenerative change in the midfoot and forefoot is seen. Distal phalanx of the great toe is absent. Degenerative change of the 2nd ray phalanges and 3rd ray phalanges seen with degenerative change of the 4th and 5th digits not well seen.       1. Right foot: Large heel spur. Atherosclerotic disease. Suggestion of diffuse osteopenia. No sclerosis to suggest osteomyelitis. No subcutaneous air to suggest gangrene. Absent flanges 2nd ray with difficult to visualized distal phalanges of the other digits.   2. Left foot: Large heel spur. Degenerative changes of the hindfoot midfoot and forefoot with atherosclerotic disease. Absent distal phalanx of the great toe. No  periostitis or abnormal sclerosis with diffuse osteopenia to suggest osteomyelitis. No subcutaneous air to suggest gangrene.       Signed by: Brendon Hook 12/7/2023 8:39 PM Dictation workstation:   [id number]    ECG 12 Lead    Result Date: 12/7/2023   Poor data quality, interpretation may be adversely affected Normal sinus rhythm Right bundle branch block Septal infarct , age undetermined Possible Lateral infarct , age undetermined Abnormal ECG No previous ECGs available Confirmed by Hammad Callahan ([id number]) on 12/7/2023 1:07:11 PM    XR chest 1 view    Result Date: 12/6/2023  Interpreted By:  Roger Murdock, STUDY: XR CHEST 1 VIEW;  12/6/2023 2:08 pm   INDICATION: Signs/Symptoms:Brief AMS and hypotension, now resolved.   COMPARISON: 08/21/2023..   ACCESSION NUMBER(S): [id number]   ORDERING CLINICIAN: PATEL TINSLEY   FINDINGS: New moderate left pleural effusion. There is silhouetting of the left heart border. Left subclavian stent present. Right-sided double-lumen central venous catheter terminates within the right ventricle. No acute osseous findings.       New, moderate left pleural effusion.     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-completed IV vancomycin, now on Doxycycline-at risk for recurrence-has a right chest wall dialysis catheter  ESRD on hemodialysis  Severe malnutrition-prealbumin of 3.2           Dificid-completed  Oral vancomycin-duration depends on clinical response-increase dose to  500 mg every 6 hours-avoiding Flagyl because of potential interaction with Lexapro  Questran  Oral  doxycycline as suppressive therapy for MRSA bacteremia with mitral valve infective endocarditis  Monitor temperature  and WBC  Local care  Offloading  High-protein diet  Monitor stool  Podiatry follow up  Droplet plus precautions-COVID-discontinue 1/3/2024  Contact plus precautions-alberto Coulter MD

## 2023-12-31 NOTE — PROGRESS NOTES
Ayanna Gross is a 73 y.o. female on day 25 of admission presenting with Hypotension, unspecified hypotension type.      Subjective   No acute events, denies complaints       Objective          Vitals 24HR  Heart Rate:  []   Temp:  [36 °C (96.8 °F)-36.4 °C (97.5 °F)]   Resp:  [9-29]   SpO2:  [92 %-100 %]         Intake/Output last 3 Shifts:    Intake/Output Summary (Last 24 hours) at 12/31/2023 1401  Last data filed at 12/31/2023 0600  Gross per 24 hour   Intake 720 ml   Output 300 ml   Net 420 ml       Physical Exam  Gen: NAD  HENT: atraumatic  Heart: RRR  Lungs: CTA  Abdomen: Soft  Ext; no edema  Neuro: non focal  Psych : AAO x 3    Relevant Results               Assessment/Plan      73-year-old female with end-stage renal disease  Shock possibly septic    -Planning for dialysis today  -She is currently off pressors  -Next dialysis will be on Wednesday    Ernesto Andrade MD

## 2023-12-31 NOTE — PROGRESS NOTES
Ayanna Gross is a 73 y.o. female on day 25 of admission presenting with Hypotension, unspecified hypotension type.      Subjective     Patient has been off Levophed   Blood pressure fluctuate between 80 and 90 mmhg  Patient remain on high-dose of fluid Solu-Cortef   She has been afebrile during the night.    Objective     Last Recorded Vitals  BP (!) 88/44   Pulse 97   Temp 36 °C (96.8 °F) (Temporal)   Resp 22   Wt 67.2 kg (148 lb 2.4 oz)   SpO2 99%   Intake/Output last 3 Shifts:    Intake/Output Summary (Last 24 hours) at 12/31/2023 0837  Last data filed at 12/31/2023 0600  Gross per 24 hour   Intake 1320 ml   Output 300 ml   Net 1020 ml         Admission Weight  Weight: 72.6 kg (160 lb) (12/06/23 1301)    Daily Weight  12/29/23 : 67.2 kg (148 lb 2.4 oz)    Image Results      Physical Exam  General: Pleasant, cooperating during physical exam  HEENT: Pupils are equal and reactive to light and commendation , oral mucosa moist, no JVD .  Cardiovascular: Normal sinus rhythm, no MRG.  Lungs: Clear to auscultation bilaterally, no wheezing, no crackles, no dullness to percussion.  Abdomen: No hepatosplenomegaly appreciated, soft , not tender, positive bowel sounds, positive bowel movement.  Neuro: Alert and oriented x2, strength in upper and lower extremities , sensation intact.  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 difficile colitis.  History of chronic MRSA bacteremia, infective endocarditis.  Continue with doxycycline.  Patient completed full course of IV vancomycin  Treated with p.o. vancomycin  On her baseline patient has low  blood pressure.  She is on Solu-Cortef 100 mg 3 times a day, decrease hydrocortisone to 50 mg every 8 hours.Dr. Mason  on the case    End stage renal disease   On dialysis  Dr. Mondragon   on  the  case  ID on the case  Patient completed full course of fidaxomicin     Anemia of chronic disease  Positive for cold agglutinins  Patient was advised to follow-up with her primary care physician.  Consult Dr. Leonard from hematology oncology services  Evaluated by hematology oncology monitor close.    Pleural effusion  Status post thoracocentesis  Patient was found to have transudative pleural effusion  Pulmonary on the case    COVID-19 infection  Patient is not hypoxic.  Continue with contact isolation and droplet precaution.    Hypotension  Patient has been on a low-dose of pressor for many days  Levophed discontinued yesterday.  Blood pressure in the low 80s  Discussed in detail with Dr. Bob, nephrology on the case.  On baseline patient has low blood pressure.  If she remain stable patient will be transferred to stepdown unit    Peripheral vascular disease   heel ulcers  Evaluated by podiatry  Follow-up with Jimenez Landry as outpatient     encephalopathy.  Clinically improved.    Patient is at high risk for deterioration-titrate arrhythmias.    Diabetes mellitus type II.  cover with insulin sliding scale    Continue with midodrine  Waiting for consult to see her today.  Continue with contact isolation droplet precaution.  CBC and BMP in AM.  Discussed with patient at bedside.  Discussed with her daughter in ICU.    Principal Problem:    Hypotension, unspecified hypotension type  Active Problems:    Paroxysmal atrial fibrillation (CMS/HCC)    Peripheral vascular disease (CMS/HCC)    Orthostatic hypotension    Mixed hyperlipidemia    End-stage renal disease on hemodialysis (CMS/HCC)    DM (diabetes mellitus) (CMS/HCC)    Absent pedal pulses      .          Veronica Yepez MD

## 2024-01-01 ENCOUNTER — HOSPITAL ENCOUNTER (EMERGENCY)
Facility: HOSPITAL | Age: 74
End: 2024-05-27
Attending: EMERGENCY MEDICINE
Payer: MEDICARE

## 2024-01-01 ENCOUNTER — APPOINTMENT (OUTPATIENT)
Dept: RADIOLOGY | Facility: HOSPITAL | Age: 74
DRG: 871 | End: 2024-01-01
Payer: MEDICARE

## 2024-01-01 ENCOUNTER — TELEPHONE (OUTPATIENT)
Dept: PODIATRY | Facility: HOSPITAL | Age: 74
End: 2024-01-01

## 2024-01-01 ENCOUNTER — APPOINTMENT (OUTPATIENT)
Dept: CARDIOLOGY | Facility: HOSPITAL | Age: 74
DRG: 871 | End: 2024-01-01
Payer: MEDICARE

## 2024-01-01 ENCOUNTER — APPOINTMENT (OUTPATIENT)
Dept: DIALYSIS | Facility: HOSPITAL | Age: 74
End: 2024-01-01
Payer: MEDICARE

## 2024-01-01 ENCOUNTER — HOSPITAL ENCOUNTER (INPATIENT)
Facility: HOSPITAL | Age: 74
LOS: 42 days | Discharge: NURSING, REHAB OR LONG-TERM CARE | DRG: 871 | End: 2024-03-01
Attending: EMERGENCY MEDICINE | Admitting: INTERNAL MEDICINE
Payer: MEDICARE

## 2024-01-01 ENCOUNTER — NURSING HOME VISIT (OUTPATIENT)
Dept: POST ACUTE CARE | Facility: EXTERNAL LOCATION | Age: 74
End: 2024-01-01
Payer: MEDICARE

## 2024-01-01 ENCOUNTER — HOSPITAL ENCOUNTER (INPATIENT)
Facility: HOSPITAL | Age: 74
End: 2024-01-01
Attending: INTERNAL MEDICINE | Admitting: INTERNAL MEDICINE
Payer: MEDICARE

## 2024-01-01 ENCOUNTER — HOSPITAL ENCOUNTER (INPATIENT)
Dept: CARDIOLOGY | Facility: HOSPITAL | Age: 74
Discharge: HOME | End: 2024-04-29
Payer: MEDICARE

## 2024-01-01 ENCOUNTER — APPOINTMENT (OUTPATIENT)
Dept: CARDIOLOGY | Facility: HOSPITAL | Age: 74
DRG: 312 | End: 2024-01-01
Payer: MEDICARE

## 2024-01-01 ENCOUNTER — APPOINTMENT (OUTPATIENT)
Dept: HEMATOLOGY/ONCOLOGY | Facility: CLINIC | Age: 74
End: 2024-01-01
Payer: MEDICARE

## 2024-01-01 VITALS
SYSTOLIC BLOOD PRESSURE: 60 MMHG | HEIGHT: 62 IN | BODY MASS INDEX: 43 KG/M2 | WEIGHT: 233.69 LBS | RESPIRATION RATE: 23 BRPM | TEMPERATURE: 97 F | DIASTOLIC BLOOD PRESSURE: 39 MMHG | OXYGEN SATURATION: 98 % | HEART RATE: 100 BPM

## 2024-01-01 VITALS
HEART RATE: 84 BPM | OXYGEN SATURATION: 97 % | SYSTOLIC BLOOD PRESSURE: 79 MMHG | RESPIRATION RATE: 17 BRPM | WEIGHT: 160.5 LBS | DIASTOLIC BLOOD PRESSURE: 48 MMHG | BODY MASS INDEX: 29.53 KG/M2 | HEIGHT: 62 IN | TEMPERATURE: 97.7 F

## 2024-01-01 DIAGNOSIS — M79.89 OTHER SPECIFIED SOFT TISSUE DISORDERS: ICD-10-CM

## 2024-01-01 DIAGNOSIS — N18.6 ANEMIA DUE TO CHRONIC KIDNEY DISEASE, ON CHRONIC DIALYSIS (MULTI): ICD-10-CM

## 2024-01-01 DIAGNOSIS — G47.33 OBSTRUCTIVE SLEEP APNEA: ICD-10-CM

## 2024-01-01 DIAGNOSIS — I10 PRIMARY HYPERTENSION: ICD-10-CM

## 2024-01-01 DIAGNOSIS — I05.9 RHEUMATIC MITRAL VALVE DISEASE, UNSPECIFIED: ICD-10-CM

## 2024-01-01 DIAGNOSIS — L85.3 DRY SKIN: ICD-10-CM

## 2024-01-01 DIAGNOSIS — D62 ANEMIA DUE TO BLOOD LOSS, ACUTE: Primary | ICD-10-CM

## 2024-01-01 DIAGNOSIS — A41.9 SEPSIS, DUE TO UNSPECIFIED ORGANISM, UNSPECIFIED WHETHER ACUTE ORGAN DYSFUNCTION PRESENT (MULTI): ICD-10-CM

## 2024-01-01 DIAGNOSIS — I05.0 MITRAL VALVE STENOSIS, UNSPECIFIED ETIOLOGY: ICD-10-CM

## 2024-01-01 DIAGNOSIS — M25.562 CHRONIC PAIN OF BOTH KNEES: ICD-10-CM

## 2024-01-01 DIAGNOSIS — N18.30 STAGE 3 CHRONIC KIDNEY DISEASE, UNSPECIFIED WHETHER STAGE 3A OR 3B CKD (MULTI): ICD-10-CM

## 2024-01-01 DIAGNOSIS — G92.8 TOXIC METABOLIC ENCEPHALOPATHY: ICD-10-CM

## 2024-01-01 DIAGNOSIS — T14.8XXA WOUND INFECTION: ICD-10-CM

## 2024-01-01 DIAGNOSIS — Z99.2 DEPENDENCE ON RENAL DIALYSIS (CMS-HCC): ICD-10-CM

## 2024-01-01 DIAGNOSIS — F41.8 MIXED ANXIETY AND DEPRESSIVE DISORDER: ICD-10-CM

## 2024-01-01 DIAGNOSIS — Z79.4 TYPE 2 DIABETES MELLITUS WITH OTHER SPECIFIED COMPLICATION, WITH LONG-TERM CURRENT USE OF INSULIN (MULTI): ICD-10-CM

## 2024-01-01 DIAGNOSIS — R50.9 FEVER, UNSPECIFIED FEVER CAUSE: ICD-10-CM

## 2024-01-01 DIAGNOSIS — I73.9 PERIPHERAL VASCULAR DISEASE (CMS-HCC): ICD-10-CM

## 2024-01-01 DIAGNOSIS — L98.9 SKIN ABNORMALITY: ICD-10-CM

## 2024-01-01 DIAGNOSIS — K21.9 GASTROESOPHAGEAL REFLUX DISEASE, UNSPECIFIED WHETHER ESOPHAGITIS PRESENT: ICD-10-CM

## 2024-01-01 DIAGNOSIS — M25.561 CHRONIC PAIN OF BOTH KNEES: ICD-10-CM

## 2024-01-01 DIAGNOSIS — A04.72 C. DIFFICILE COLITIS: ICD-10-CM

## 2024-01-01 DIAGNOSIS — R60.0 LOCALIZED EDEMA: ICD-10-CM

## 2024-01-01 DIAGNOSIS — A04.72 C. DIFFICILE COLITIS: Primary | ICD-10-CM

## 2024-01-01 DIAGNOSIS — F32.A DEPRESSION, UNSPECIFIED DEPRESSION TYPE: ICD-10-CM

## 2024-01-01 DIAGNOSIS — R11.2 NAUSEA AND VOMITING IN ADULT: ICD-10-CM

## 2024-01-01 DIAGNOSIS — N18.4 CHRONIC KIDNEY DISEASE (CKD), STAGE IV (SEVERE) (MULTI): ICD-10-CM

## 2024-01-01 DIAGNOSIS — M15.9 PRIMARY OSTEOARTHRITIS INVOLVING MULTIPLE JOINTS: Primary | ICD-10-CM

## 2024-01-01 DIAGNOSIS — E11.69 TYPE 2 DIABETES MELLITUS WITH OTHER SPECIFIED COMPLICATION, WITH LONG-TERM CURRENT USE OF INSULIN (MULTI): ICD-10-CM

## 2024-01-01 DIAGNOSIS — A41.9 SEPTIC SHOCK (MULTI): ICD-10-CM

## 2024-01-01 DIAGNOSIS — B37.2 YEAST INFECTION OF THE SKIN: ICD-10-CM

## 2024-01-01 DIAGNOSIS — N18.6 END-STAGE RENAL DISEASE ON HEMODIALYSIS (MULTI): ICD-10-CM

## 2024-01-01 DIAGNOSIS — Z99.2 END-STAGE RENAL DISEASE ON HEMODIALYSIS (MULTI): ICD-10-CM

## 2024-01-01 DIAGNOSIS — Z99.2 ANEMIA DUE TO CHRONIC KIDNEY DISEASE, ON CHRONIC DIALYSIS (MULTI): ICD-10-CM

## 2024-01-01 DIAGNOSIS — L08.9 WOUND INFECTION: ICD-10-CM

## 2024-01-01 DIAGNOSIS — M17.12 ARTHRITIS OF KNEE, LEFT: ICD-10-CM

## 2024-01-01 DIAGNOSIS — R06.02 SHORTNESS OF BREATH: ICD-10-CM

## 2024-01-01 DIAGNOSIS — D63.1 ANEMIA DUE TO CHRONIC KIDNEY DISEASE, ON CHRONIC DIALYSIS (MULTI): ICD-10-CM

## 2024-01-01 DIAGNOSIS — K59.00 CONSTIPATION, UNSPECIFIED CONSTIPATION TYPE: ICD-10-CM

## 2024-01-01 DIAGNOSIS — I46.9 CARDIAC ARREST (MULTI): Primary | ICD-10-CM

## 2024-01-01 DIAGNOSIS — I95.9 HYPOTENSION, UNSPECIFIED HYPOTENSION TYPE: ICD-10-CM

## 2024-01-01 DIAGNOSIS — R65.21 SEPTIC SHOCK (MULTI): ICD-10-CM

## 2024-01-01 DIAGNOSIS — R26.2 DIFFICULTY IN WALKING: Primary | ICD-10-CM

## 2024-01-01 DIAGNOSIS — I50.42 CHRONIC COMBINED SYSTOLIC AND DIASTOLIC HEART FAILURE (MULTI): ICD-10-CM

## 2024-01-01 DIAGNOSIS — I48.0 PAROXYSMAL ATRIAL FIBRILLATION (MULTI): Primary | ICD-10-CM

## 2024-01-01 DIAGNOSIS — I95.9 HYPOTENSION, UNSPECIFIED: ICD-10-CM

## 2024-01-01 DIAGNOSIS — I50.33 ACUTE ON CHRONIC DIASTOLIC CONGESTIVE HEART FAILURE (MULTI): ICD-10-CM

## 2024-01-01 DIAGNOSIS — G89.29 CHRONIC PAIN OF BOTH KNEES: ICD-10-CM

## 2024-01-01 LAB
ABO GROUP (TYPE) IN BLOOD: NORMAL
ABO GROUP (TYPE) IN BLOOD: NORMAL
ALBUMIN SERPL-MCNC: 1.9 G/DL (ref 3.5–5)
ALBUMIN SERPL-MCNC: 2.1 G/DL (ref 3.5–5)
ALBUMIN SERPL-MCNC: 2.2 G/DL (ref 3.5–5)
ALBUMIN SERPL-MCNC: 2.2 G/DL (ref 3.5–5)
ALBUMIN SERPL-MCNC: 2.3 G/DL (ref 3.5–5)
ALBUMIN SERPL-MCNC: 2.3 G/DL (ref 3.5–5)
ALBUMIN SERPL-MCNC: 2.4 G/DL (ref 3.5–5)
ALBUMIN SERPL-MCNC: 2.4 G/DL (ref 3.5–5)
ALBUMIN SERPL-MCNC: 2.5 G/DL (ref 3.5–5)
ALBUMIN SERPL-MCNC: 2.6 G/DL (ref 3.5–5)
ALBUMIN SERPL-MCNC: 2.7 G/DL (ref 3.5–5)
ALBUMIN SERPL-MCNC: 2.8 G/DL (ref 3.5–5)
ALBUMIN SERPL-MCNC: 2.8 G/DL (ref 3.5–5)
ALBUMIN SERPL-MCNC: 2.9 G/DL (ref 3.5–5)
ALBUMIN SERPL-MCNC: 3.1 G/DL (ref 3.5–5)
ALBUMIN: 2.8 G/DL (ref 3.4–5)
ALP BLD-CCNC: 106 U/L (ref 35–125)
ALP BLD-CCNC: 107 U/L (ref 35–125)
ALP BLD-CCNC: 113 U/L (ref 35–125)
ALP BLD-CCNC: 119 U/L (ref 35–125)
ALP BLD-CCNC: 120 U/L (ref 35–125)
ALP BLD-CCNC: 140 U/L (ref 35–125)
ALP BLD-CCNC: 162 U/L (ref 35–125)
ALP BLD-CCNC: 183 U/L (ref 35–125)
ALP BLD-CCNC: 193 U/L (ref 35–125)
ALP BLD-CCNC: 216 U/L (ref 35–125)
ALP BLD-CCNC: 223 U/L (ref 35–125)
ALP BLD-CCNC: 234 U/L (ref 35–125)
ALP BLD-CCNC: 239 U/L (ref 35–125)
ALP BLD-CCNC: 247 U/L (ref 35–125)
ALP BLD-CCNC: 253 U/L (ref 35–125)
ALP BLD-CCNC: 255 U/L (ref 35–125)
ALP BLD-CCNC: 353 U/L (ref 35–125)
ALP BLD-CCNC: 65 U/L (ref 35–125)
ALP BLD-CCNC: 89 U/L (ref 35–125)
ALP BLD-CCNC: 92 U/L (ref 35–125)
ALPHA 1 GLOBULIN: 0.4 G/DL (ref 0.2–0.6)
ALPHA 2 GLOBULIN: 0.6 G/DL (ref 0.4–1.1)
ALT SERPL-CCNC: 10 U/L (ref 5–40)
ALT SERPL-CCNC: 11 U/L (ref 5–40)
ALT SERPL-CCNC: 12 U/L (ref 5–40)
ALT SERPL-CCNC: 13 U/L (ref 5–40)
ALT SERPL-CCNC: 14 U/L (ref 5–40)
ALT SERPL-CCNC: 14 U/L (ref 5–40)
ALT SERPL-CCNC: 15 U/L (ref 5–40)
ALT SERPL-CCNC: 20 U/L (ref 5–40)
ALT SERPL-CCNC: 5 U/L (ref 5–40)
ALT SERPL-CCNC: 6 U/L (ref 5–40)
ALT SERPL-CCNC: 9 U/L (ref 5–40)
ALT SERPL-CCNC: 9 U/L (ref 5–40)
AMYLASE FLD-CCNC: <10 U/L
ANION GAP BLDV CALCULATED.4IONS-SCNC: 13 MMOL/L (ref 10–25)
ANION GAP BLDV CALCULATED.4IONS-SCNC: 15 MMOL/L (ref 10–25)
ANION GAP BLDV CALCULATED.4IONS-SCNC: 19 MMOL/L (ref 10–25)
ANION GAP SERPL CALC-SCNC: 10 MMOL/L
ANION GAP SERPL CALC-SCNC: 11 MMOL/L
ANION GAP SERPL CALC-SCNC: 12 MMOL/L
ANION GAP SERPL CALC-SCNC: 13 MMOL/L
ANION GAP SERPL CALC-SCNC: 14 MMOL/L
ANION GAP SERPL CALC-SCNC: 15 MMOL/L
ANION GAP SERPL CALC-SCNC: 16 MMOL/L
ANION GAP SERPL CALC-SCNC: 18 MMOL/L
ANTIBODY SCREEN: NORMAL
AORTIC VALVE PEAK VELOCITY: 0.84 M/S
AORTIC VALVE PEAK VELOCITY: 1.15 M/S
APPEARANCE UR: ABNORMAL
AST SERPL-CCNC: 14 U/L (ref 5–40)
AST SERPL-CCNC: 15 U/L (ref 5–40)
AST SERPL-CCNC: 16 U/L (ref 5–40)
AST SERPL-CCNC: 17 U/L (ref 5–40)
AST SERPL-CCNC: 19 U/L (ref 5–40)
AST SERPL-CCNC: 22 U/L (ref 5–40)
AST SERPL-CCNC: 23 U/L (ref 5–40)
AST SERPL-CCNC: 24 U/L (ref 5–40)
AST SERPL-CCNC: 24 U/L (ref 5–40)
AST SERPL-CCNC: 26 U/L (ref 5–40)
AST SERPL-CCNC: 29 U/L (ref 5–40)
AST SERPL-CCNC: 29 U/L (ref 5–40)
AST SERPL-CCNC: 30 U/L (ref 5–40)
AST SERPL-CCNC: 32 U/L (ref 5–40)
AST SERPL-CCNC: 34 U/L (ref 5–40)
AST SERPL-CCNC: 34 U/L (ref 5–40)
AST SERPL-CCNC: 35 U/L (ref 5–40)
AST SERPL-CCNC: 38 U/L (ref 5–40)
AST SERPL-CCNC: 50 U/L (ref 5–40)
AST SERPL-CCNC: 66 U/L (ref 5–40)
ATRIAL RATE: 106 BPM
ATRIAL RATE: 89 BPM
AV PEAK GRADIENT: 2.8 MMHG
AV PEAK GRADIENT: 5.3 MMHG
AVA (PEAK VEL): 1.57 CM2
AVA (PEAK VEL): 2.74 CM2
BACTERIA #/AREA URNS AUTO: ABNORMAL /HPF
BACTERIA BLD CULT: NORMAL
BACTERIA BLD CULT: NORMAL
BACTERIA SPEC CULT: ABNORMAL
BACTERIA UR CULT: ABNORMAL
BASE EXCESS BLDV CALC-SCNC: -14.3 MMOL/L (ref -2–3)
BASE EXCESS BLDV CALC-SCNC: -2.6 MMOL/L (ref -2–3)
BASE EXCESS BLDV CALC-SCNC: -3.4 MMOL/L (ref -2–3)
BASOPHILS # BLD AUTO: 0 X10*3/UL (ref 0–0.1)
BASOPHILS # BLD AUTO: 0.02 X10*3/UL (ref 0–0.1)
BASOPHILS # BLD AUTO: 0.07 X10*3/UL (ref 0–0.1)
BASOPHILS # BLD AUTO: 0.08 X10*3/UL (ref 0–0.1)
BASOPHILS # BLD AUTO: 0.09 X10*3/UL (ref 0–0.1)
BASOPHILS # BLD AUTO: 0.1 X10*3/UL (ref 0–0.1)
BASOPHILS # BLD AUTO: 0.1 X10*3/UL (ref 0–0.1)
BASOPHILS # BLD AUTO: 0.12 X10*3/UL (ref 0–0.1)
BASOPHILS # BLD AUTO: 0.13 X10*3/UL (ref 0–0.1)
BASOPHILS # BLD AUTO: 0.14 X10*3/UL (ref 0–0.1)
BASOPHILS # BLD AUTO: 0.14 X10*3/UL (ref 0–0.1)
BASOPHILS # BLD AUTO: NORMAL 10*3/UL
BASOPHILS # BLD MANUAL: 0 X10*3/UL (ref 0–0.1)
BASOPHILS NFR BLD AUTO: 0 %
BASOPHILS NFR BLD AUTO: 0.2 %
BASOPHILS NFR BLD AUTO: 0.4 %
BASOPHILS NFR BLD AUTO: 0.4 %
BASOPHILS NFR BLD AUTO: 0.5 %
BASOPHILS NFR BLD AUTO: 0.5 %
BASOPHILS NFR BLD AUTO: 0.6 %
BASOPHILS NFR BLD AUTO: 0.8 %
BASOPHILS NFR BLD AUTO: 1 %
BASOPHILS NFR BLD AUTO: 1.2 %
BASOPHILS NFR BLD AUTO: 1.4 %
BASOPHILS NFR BLD AUTO: 1.4 %
BASOPHILS NFR BLD AUTO: 1.6 %
BASOPHILS NFR BLD AUTO: NORMAL %
BASOPHILS NFR BLD MANUAL: 0 %
BASOPHILS NFR FLD MANUAL: 0 %
BETA GLOBULIN: 0.8 G/DL (ref 0.5–1.2)
BILIRUB DIRECT SERPL-MCNC: 1.1 MG/DL (ref 0–0.2)
BILIRUB DIRECT SERPL-MCNC: 1.4 MG/DL (ref 0–0.2)
BILIRUB SERPL-MCNC: 0.6 MG/DL (ref 0.1–1.2)
BILIRUB SERPL-MCNC: 0.8 MG/DL (ref 0.1–1.2)
BILIRUB SERPL-MCNC: 0.9 MG/DL (ref 0.1–1.2)
BILIRUB SERPL-MCNC: 0.9 MG/DL (ref 0.1–1.2)
BILIRUB SERPL-MCNC: 1 MG/DL (ref 0.1–1.2)
BILIRUB SERPL-MCNC: 1.1 MG/DL (ref 0.1–1.2)
BILIRUB SERPL-MCNC: 1.1 MG/DL (ref 0.1–1.2)
BILIRUB SERPL-MCNC: 1.2 MG/DL (ref 0.1–1.2)
BILIRUB SERPL-MCNC: 1.4 MG/DL (ref 0.1–1.2)
BILIRUB SERPL-MCNC: 1.4 MG/DL (ref 0.1–1.2)
BILIRUB SERPL-MCNC: 1.5 MG/DL (ref 0.1–1.2)
BILIRUB SERPL-MCNC: 1.5 MG/DL (ref 0.1–1.2)
BILIRUB SERPL-MCNC: 1.6 MG/DL (ref 0.1–1.2)
BILIRUB SERPL-MCNC: 1.6 MG/DL (ref 0.1–1.2)
BILIRUB SERPL-MCNC: 1.7 MG/DL (ref 0.1–1.2)
BILIRUB SERPL-MCNC: 1.7 MG/DL (ref 0.1–1.2)
BILIRUB SERPL-MCNC: 1.8 MG/DL (ref 0.1–1.2)
BILIRUB SERPL-MCNC: 1.9 MG/DL (ref 0.1–1.2)
BILIRUB UR STRIP.AUTO-MCNC: NEGATIVE MG/DL
BLASTS NFR FLD MANUAL: 0 %
BLOOD EXPIRATION DATE: NORMAL
BLOOD EXPIRATION DATE: NORMAL
BODY SURFACE AREA: 2.1 M2
BODY TEMPERATURE: 37 DEGREES CELSIUS
BUN SERPL-MCNC: 10 MG/DL (ref 8–25)
BUN SERPL-MCNC: 11 MG/DL (ref 8–25)
BUN SERPL-MCNC: 12 MG/DL (ref 8–25)
BUN SERPL-MCNC: 12 MG/DL (ref 8–25)
BUN SERPL-MCNC: 13 MG/DL (ref 8–25)
BUN SERPL-MCNC: 13 MG/DL (ref 8–25)
BUN SERPL-MCNC: 15 MG/DL (ref 8–25)
BUN SERPL-MCNC: 16 MG/DL (ref 8–25)
BUN SERPL-MCNC: 16 MG/DL (ref 8–25)
BUN SERPL-MCNC: 17 MG/DL (ref 8–25)
BUN SERPL-MCNC: 19 MG/DL (ref 8–25)
BUN SERPL-MCNC: 20 MG/DL (ref 8–25)
BUN SERPL-MCNC: 21 MG/DL (ref 8–25)
BUN SERPL-MCNC: 23 MG/DL (ref 8–25)
BUN SERPL-MCNC: 23 MG/DL (ref 8–25)
BUN SERPL-MCNC: 24 MG/DL (ref 8–25)
BUN SERPL-MCNC: 24 MG/DL (ref 8–25)
BUN SERPL-MCNC: 25 MG/DL (ref 8–25)
BUN SERPL-MCNC: 26 MG/DL (ref 8–25)
BUN SERPL-MCNC: 27 MG/DL (ref 8–25)
BUN SERPL-MCNC: 27 MG/DL (ref 8–25)
BUN SERPL-MCNC: 28 MG/DL (ref 8–25)
BUN SERPL-MCNC: 28 MG/DL (ref 8–25)
BUN SERPL-MCNC: 29 MG/DL (ref 8–25)
BUN SERPL-MCNC: 31 MG/DL (ref 8–25)
BUN SERPL-MCNC: 36 MG/DL (ref 8–25)
BUN SERPL-MCNC: 37 MG/DL (ref 8–25)
BUN SERPL-MCNC: 37 MG/DL (ref 8–25)
BUN SERPL-MCNC: 38 MG/DL (ref 8–25)
BUN SERPL-MCNC: 7 MG/DL (ref 8–25)
BUN SERPL-MCNC: 9 MG/DL (ref 8–25)
BURR CELLS BLD QL SMEAR: ABNORMAL
BURR CELLS BLD QL SMEAR: NORMAL
C DIF TOX TCDA+TCDB STL QL NAA+PROBE: NOT DETECTED
CA-I BLDV-SCNC: 1.02 MMOL/L (ref 1.1–1.33)
CA-I BLDV-SCNC: 1.15 MMOL/L (ref 1.1–1.33)
CA-I BLDV-SCNC: 1.2 MMOL/L (ref 1.1–1.33)
CALCIUM SERPL-MCNC: 6.3 MG/DL (ref 8.5–10.4)
CALCIUM SERPL-MCNC: 8 MG/DL (ref 8.5–10.4)
CALCIUM SERPL-MCNC: 8.2 MG/DL (ref 8.5–10.4)
CALCIUM SERPL-MCNC: 8.3 MG/DL (ref 8.5–10.4)
CALCIUM SERPL-MCNC: 8.4 MG/DL (ref 8.5–10.4)
CALCIUM SERPL-MCNC: 8.5 MG/DL (ref 8.5–10.4)
CALCIUM SERPL-MCNC: 8.6 MG/DL (ref 8.5–10.4)
CALCIUM SERPL-MCNC: 8.7 MG/DL (ref 8.5–10.4)
CALCIUM SERPL-MCNC: 8.8 MG/DL (ref 8.5–10.4)
CALCIUM SERPL-MCNC: 8.9 MG/DL (ref 8.5–10.4)
CALCIUM SERPL-MCNC: 9 MG/DL (ref 8.5–10.4)
CALCIUM SERPL-MCNC: 9.1 MG/DL (ref 8.5–10.4)
CALCIUM SERPL-MCNC: 9.1 MG/DL (ref 8.5–10.4)
CALCIUM SERPL-MCNC: 9.2 MG/DL (ref 8.5–10.4)
CHLORIDE BLDV-SCNC: 101 MMOL/L (ref 98–107)
CHLORIDE BLDV-SCNC: 96 MMOL/L (ref 98–107)
CHLORIDE BLDV-SCNC: 97 MMOL/L (ref 98–107)
CHLORIDE SERPL-SCNC: 100 MMOL/L (ref 97–107)
CHLORIDE SERPL-SCNC: 101 MMOL/L (ref 97–107)
CHLORIDE SERPL-SCNC: 102 MMOL/L (ref 97–107)
CHLORIDE SERPL-SCNC: 104 MMOL/L (ref 97–107)
CHLORIDE SERPL-SCNC: 92 MMOL/L (ref 97–107)
CHLORIDE SERPL-SCNC: 93 MMOL/L (ref 97–107)
CHLORIDE SERPL-SCNC: 95 MMOL/L (ref 97–107)
CHLORIDE SERPL-SCNC: 95 MMOL/L (ref 97–107)
CHLORIDE SERPL-SCNC: 96 MMOL/L (ref 97–107)
CHLORIDE SERPL-SCNC: 97 MMOL/L (ref 97–107)
CHLORIDE SERPL-SCNC: 98 MMOL/L (ref 97–107)
CHLORIDE SERPL-SCNC: 99 MMOL/L (ref 97–107)
CLARITY FLD: CLEAR
CO2 SERPL-SCNC: 16 MMOL/L (ref 24–31)
CO2 SERPL-SCNC: 16 MMOL/L (ref 24–31)
CO2 SERPL-SCNC: 17 MMOL/L (ref 24–31)
CO2 SERPL-SCNC: 18 MMOL/L (ref 24–31)
CO2 SERPL-SCNC: 19 MMOL/L (ref 24–31)
CO2 SERPL-SCNC: 20 MMOL/L (ref 24–31)
CO2 SERPL-SCNC: 21 MMOL/L (ref 24–31)
CO2 SERPL-SCNC: 22 MMOL/L (ref 24–31)
CO2 SERPL-SCNC: 23 MMOL/L (ref 24–31)
CO2 SERPL-SCNC: 24 MMOL/L (ref 24–31)
CO2 SERPL-SCNC: 26 MMOL/L (ref 24–31)
CO2 SERPL-SCNC: 26 MMOL/L (ref 24–31)
CO2 SERPL-SCNC: 28 MMOL/L (ref 24–31)
CO2 SERPL-SCNC: 28 MMOL/L (ref 24–31)
COLD AGGLUTININ TITER: NORMAL
COLOR FLD: NORMAL
COLOR UR: ABNORMAL
CORTIS AM PEAK SERPL-MSCNC: 36.4 UG/DL (ref 5–20)
CREAT SERPL-MCNC: 1.5 MG/DL (ref 0.4–1.6)
CREAT SERPL-MCNC: 1.6 MG/DL (ref 0.4–1.6)
CREAT SERPL-MCNC: 1.9 MG/DL (ref 0.4–1.6)
CREAT SERPL-MCNC: 2 MG/DL (ref 0.4–1.6)
CREAT SERPL-MCNC: 2.2 MG/DL (ref 0.4–1.6)
CREAT SERPL-MCNC: 2.2 MG/DL (ref 0.4–1.6)
CREAT SERPL-MCNC: 2.3 MG/DL (ref 0.4–1.6)
CREAT SERPL-MCNC: 2.4 MG/DL (ref 0.4–1.6)
CREAT SERPL-MCNC: 2.5 MG/DL (ref 0.4–1.6)
CREAT SERPL-MCNC: 2.6 MG/DL (ref 0.4–1.6)
CREAT SERPL-MCNC: 2.7 MG/DL (ref 0.4–1.6)
CREAT SERPL-MCNC: 2.8 MG/DL (ref 0.4–1.6)
CREAT SERPL-MCNC: 2.9 MG/DL (ref 0.4–1.6)
CREAT SERPL-MCNC: 3 MG/DL (ref 0.4–1.6)
CREAT SERPL-MCNC: 3.1 MG/DL (ref 0.4–1.6)
CREAT SERPL-MCNC: 3.1 MG/DL (ref 0.4–1.6)
CREAT SERPL-MCNC: 3.2 MG/DL (ref 0.4–1.6)
CREAT SERPL-MCNC: 3.3 MG/DL (ref 0.4–1.6)
CREAT SERPL-MCNC: 3.3 MG/DL (ref 0.4–1.6)
CREAT SERPL-MCNC: 3.4 MG/DL (ref 0.4–1.6)
CREAT SERPL-MCNC: 3.4 MG/DL (ref 0.4–1.6)
CREAT SERPL-MCNC: 3.5 MG/DL (ref 0.4–1.6)
CREAT SERPL-MCNC: 3.9 MG/DL (ref 0.4–1.6)
CREAT SERPL-MCNC: 4.2 MG/DL (ref 0.4–1.6)
DISPENSE STATUS: NORMAL
DISPENSE STATUS: NORMAL
EGFRCR SERPLBLD CKD-EPI 2021: 11 ML/MIN/1.73M*2
EGFRCR SERPLBLD CKD-EPI 2021: 12 ML/MIN/1.73M*2
EGFRCR SERPLBLD CKD-EPI 2021: 13 ML/MIN/1.73M*2
EGFRCR SERPLBLD CKD-EPI 2021: 14 ML/MIN/1.73M*2
EGFRCR SERPLBLD CKD-EPI 2021: 15 ML/MIN/1.73M*2
EGFRCR SERPLBLD CKD-EPI 2021: 16 ML/MIN/1.73M*2
EGFRCR SERPLBLD CKD-EPI 2021: 17 ML/MIN/1.73M*2
EGFRCR SERPLBLD CKD-EPI 2021: 18 ML/MIN/1.73M*2
EGFRCR SERPLBLD CKD-EPI 2021: 19 ML/MIN/1.73M*2
EGFRCR SERPLBLD CKD-EPI 2021: 20 ML/MIN/1.73M*2
EGFRCR SERPLBLD CKD-EPI 2021: 21 ML/MIN/1.73M*2
EGFRCR SERPLBLD CKD-EPI 2021: 22 ML/MIN/1.73M*2
EGFRCR SERPLBLD CKD-EPI 2021: 23 ML/MIN/1.73M*2
EGFRCR SERPLBLD CKD-EPI 2021: 23 ML/MIN/1.73M*2
EGFRCR SERPLBLD CKD-EPI 2021: 26 ML/MIN/1.73M*2
EGFRCR SERPLBLD CKD-EPI 2021: 26 ML/MIN/1.73M*2
EGFRCR SERPLBLD CKD-EPI 2021: 28 ML/MIN/1.73M*2
EGFRCR SERPLBLD CKD-EPI 2021: 34 ML/MIN/1.73M*2
EGFRCR SERPLBLD CKD-EPI 2021: 37 ML/MIN/1.73M*2
EJECTION FRACTION APICAL 4 CHAMBER: 68.2
EJECTION FRACTION APICAL 4 CHAMBER: 68.3
EJECTION FRACTION: 68 %
EOSINOPHIL # BLD AUTO: 0.01 X10*3/UL (ref 0–0.4)
EOSINOPHIL # BLD AUTO: 0.19 X10*3/UL (ref 0–0.4)
EOSINOPHIL # BLD AUTO: 0.38 X10*3/UL (ref 0–0.4)
EOSINOPHIL # BLD AUTO: 0.5 X10*3/UL (ref 0–0.4)
EOSINOPHIL # BLD AUTO: 0.5 X10*3/UL (ref 0–0.4)
EOSINOPHIL # BLD AUTO: 0.53 X10*3/UL (ref 0–0.4)
EOSINOPHIL # BLD AUTO: 0.54 X10*3/UL (ref 0–0.4)
EOSINOPHIL # BLD AUTO: 0.58 X10*3/UL (ref 0–0.4)
EOSINOPHIL # BLD AUTO: 0.58 X10*3/UL (ref 0–0.4)
EOSINOPHIL # BLD AUTO: 0.65 X10*3/UL (ref 0–0.4)
EOSINOPHIL # BLD AUTO: 0.68 X10*3/UL (ref 0–0.4)
EOSINOPHIL # BLD AUTO: 0.71 X10*3/UL (ref 0–0.4)
EOSINOPHIL # BLD AUTO: 0.89 X10*3/UL (ref 0–0.4)
EOSINOPHIL # BLD AUTO: NORMAL 10*3/UL
EOSINOPHIL # BLD MANUAL: 0 X10*3/UL (ref 0–0.4)
EOSINOPHIL NFR BLD AUTO: 0.1 %
EOSINOPHIL NFR BLD AUTO: 0.9 %
EOSINOPHIL NFR BLD AUTO: 2.1 %
EOSINOPHIL NFR BLD AUTO: 2.4 %
EOSINOPHIL NFR BLD AUTO: 3 %
EOSINOPHIL NFR BLD AUTO: 3.2 %
EOSINOPHIL NFR BLD AUTO: 4.7 %
EOSINOPHIL NFR BLD AUTO: 4.9 %
EOSINOPHIL NFR BLD AUTO: 5.7 %
EOSINOPHIL NFR BLD AUTO: 5.8 %
EOSINOPHIL NFR BLD AUTO: 6.9 %
EOSINOPHIL NFR BLD AUTO: 7.1 %
EOSINOPHIL NFR BLD AUTO: 8 %
EOSINOPHIL NFR BLD AUTO: NORMAL %
EOSINOPHIL NFR BLD MANUAL: 0 %
EOSINOPHIL NFR FLD MANUAL: 0 %
ERYTHROCYTE [DISTWIDTH] IN BLOOD BY AUTOMATED COUNT: 18.6 % (ref 11.5–14.5)
ERYTHROCYTE [DISTWIDTH] IN BLOOD BY AUTOMATED COUNT: 19 % (ref 11.5–14.5)
ERYTHROCYTE [DISTWIDTH] IN BLOOD BY AUTOMATED COUNT: 19.2 % (ref 11.5–14.5)
ERYTHROCYTE [DISTWIDTH] IN BLOOD BY AUTOMATED COUNT: 19.4 % (ref 11.5–14.5)
ERYTHROCYTE [DISTWIDTH] IN BLOOD BY AUTOMATED COUNT: 19.5 % (ref 11.5–14.5)
ERYTHROCYTE [DISTWIDTH] IN BLOOD BY AUTOMATED COUNT: 19.8 % (ref 11.5–14.5)
ERYTHROCYTE [DISTWIDTH] IN BLOOD BY AUTOMATED COUNT: 19.8 % (ref 11.5–14.5)
ERYTHROCYTE [DISTWIDTH] IN BLOOD BY AUTOMATED COUNT: 20.1 % (ref 11.5–14.5)
ERYTHROCYTE [DISTWIDTH] IN BLOOD BY AUTOMATED COUNT: 20.3 % (ref 11.5–14.5)
ERYTHROCYTE [DISTWIDTH] IN BLOOD BY AUTOMATED COUNT: 20.4 % (ref 11.5–14.5)
ERYTHROCYTE [DISTWIDTH] IN BLOOD BY AUTOMATED COUNT: 20.9 % (ref 11.5–14.5)
ERYTHROCYTE [DISTWIDTH] IN BLOOD BY AUTOMATED COUNT: 20.9 % (ref 11.5–14.5)
ERYTHROCYTE [DISTWIDTH] IN BLOOD BY AUTOMATED COUNT: 21.1 % (ref 11.5–14.5)
ERYTHROCYTE [DISTWIDTH] IN BLOOD BY AUTOMATED COUNT: 21.3 % (ref 11.5–14.5)
ERYTHROCYTE [DISTWIDTH] IN BLOOD BY AUTOMATED COUNT: 21.5 % (ref 11.5–14.5)
ERYTHROCYTE [DISTWIDTH] IN BLOOD BY AUTOMATED COUNT: 22.3 % (ref 11.5–14.5)
ERYTHROCYTE [DISTWIDTH] IN BLOOD BY AUTOMATED COUNT: 22.7 % (ref 11.5–14.5)
ERYTHROCYTE [DISTWIDTH] IN BLOOD BY AUTOMATED COUNT: 22.7 % (ref 11.5–14.5)
ERYTHROCYTE [DISTWIDTH] IN BLOOD BY AUTOMATED COUNT: 22.8 % (ref 11.5–14.5)
ERYTHROCYTE [DISTWIDTH] IN BLOOD BY AUTOMATED COUNT: 22.8 % (ref 11.5–14.5)
ERYTHROCYTE [DISTWIDTH] IN BLOOD BY AUTOMATED COUNT: 23.1 % (ref 11.5–14.5)
ERYTHROCYTE [DISTWIDTH] IN BLOOD BY AUTOMATED COUNT: 23.9 % (ref 11.5–14.5)
ERYTHROCYTE [DISTWIDTH] IN BLOOD BY AUTOMATED COUNT: 23.9 % (ref 11.5–14.5)
ERYTHROCYTE [DISTWIDTH] IN BLOOD BY AUTOMATED COUNT: 24 % (ref 11.5–14.5)
ERYTHROCYTE [DISTWIDTH] IN BLOOD BY AUTOMATED COUNT: 24 % (ref 11.5–14.5)
ERYTHROCYTE [DISTWIDTH] IN BLOOD BY AUTOMATED COUNT: 24.1 % (ref 11.5–14.5)
ERYTHROCYTE [DISTWIDTH] IN BLOOD BY AUTOMATED COUNT: 24.2 % (ref 11.5–14.5)
ERYTHROCYTE [DISTWIDTH] IN BLOOD BY AUTOMATED COUNT: 24.3 % (ref 11.5–14.5)
ERYTHROCYTE [DISTWIDTH] IN BLOOD BY AUTOMATED COUNT: 24.4 % (ref 11.5–14.5)
ERYTHROCYTE [DISTWIDTH] IN BLOOD BY AUTOMATED COUNT: 24.4 % (ref 11.5–14.5)
ERYTHROCYTE [DISTWIDTH] IN BLOOD BY AUTOMATED COUNT: 24.5 % (ref 11.5–14.5)
ERYTHROCYTE [DISTWIDTH] IN BLOOD BY AUTOMATED COUNT: 24.6 % (ref 11.5–14.5)
ERYTHROCYTE [DISTWIDTH] IN BLOOD BY AUTOMATED COUNT: 24.7 % (ref 11.5–14.5)
ERYTHROCYTE [DISTWIDTH] IN BLOOD BY AUTOMATED COUNT: 25 % (ref 11.5–14.5)
ERYTHROCYTE [DISTWIDTH] IN BLOOD BY AUTOMATED COUNT: 25.3 % (ref 11.5–14.5)
ERYTHROCYTE [DISTWIDTH] IN BLOOD BY AUTOMATED COUNT: 28.3 % (ref 11.5–14.5)
ERYTHROCYTE [DISTWIDTH] IN BLOOD BY AUTOMATED COUNT: NORMAL %
ERYTHROCYTE [SEDIMENTATION RATE] IN BLOOD BY WESTERGREN METHOD: 49 MM/H (ref 0–30)
FLUAV RNA RESP QL NAA+PROBE: NOT DETECTED
FLUBV RNA RESP QL NAA+PROBE: NOT DETECTED
GAMMA GLOBULIN: 1.5 G/DL (ref 0.5–1.4)
GFR SERPL CREATININE-BSD FRML MDRD: 14 ML/MIN/1.73M*2
GFR SERPL CREATININE-BSD FRML MDRD: 16 ML/MIN/1.73M*2
GFR SERPL CREATININE-BSD FRML MDRD: 26 ML/MIN/1.73M*2
GLUCOSE BLD MANUAL STRIP-MCNC: 100 MG/DL (ref 74–99)
GLUCOSE BLD MANUAL STRIP-MCNC: 103 MG/DL (ref 74–99)
GLUCOSE BLD MANUAL STRIP-MCNC: 106 MG/DL (ref 74–99)
GLUCOSE BLD MANUAL STRIP-MCNC: 109 MG/DL (ref 74–99)
GLUCOSE BLD MANUAL STRIP-MCNC: 112 MG/DL (ref 74–99)
GLUCOSE BLD MANUAL STRIP-MCNC: 114 MG/DL (ref 74–99)
GLUCOSE BLD MANUAL STRIP-MCNC: 116 MG/DL (ref 74–99)
GLUCOSE BLD MANUAL STRIP-MCNC: 118 MG/DL (ref 74–99)
GLUCOSE BLD MANUAL STRIP-MCNC: 119 MG/DL (ref 74–99)
GLUCOSE BLD MANUAL STRIP-MCNC: 119 MG/DL (ref 74–99)
GLUCOSE BLD MANUAL STRIP-MCNC: 121 MG/DL (ref 74–99)
GLUCOSE BLD MANUAL STRIP-MCNC: 126 MG/DL (ref 74–99)
GLUCOSE BLD MANUAL STRIP-MCNC: 128 MG/DL (ref 74–99)
GLUCOSE BLD MANUAL STRIP-MCNC: 128 MG/DL (ref 74–99)
GLUCOSE BLD MANUAL STRIP-MCNC: 129 MG/DL (ref 74–99)
GLUCOSE BLD MANUAL STRIP-MCNC: 130 MG/DL (ref 74–99)
GLUCOSE BLD MANUAL STRIP-MCNC: 131 MG/DL (ref 74–99)
GLUCOSE BLD MANUAL STRIP-MCNC: 132 MG/DL (ref 74–99)
GLUCOSE BLD MANUAL STRIP-MCNC: 133 MG/DL (ref 74–99)
GLUCOSE BLD MANUAL STRIP-MCNC: 139 MG/DL (ref 74–99)
GLUCOSE BLD MANUAL STRIP-MCNC: 140 MG/DL (ref 74–99)
GLUCOSE BLD MANUAL STRIP-MCNC: 141 MG/DL (ref 74–99)
GLUCOSE BLD MANUAL STRIP-MCNC: 141 MG/DL (ref 74–99)
GLUCOSE BLD MANUAL STRIP-MCNC: 143 MG/DL (ref 74–99)
GLUCOSE BLD MANUAL STRIP-MCNC: 143 MG/DL (ref 74–99)
GLUCOSE BLD MANUAL STRIP-MCNC: 144 MG/DL (ref 74–99)
GLUCOSE BLD MANUAL STRIP-MCNC: 144 MG/DL (ref 74–99)
GLUCOSE BLD MANUAL STRIP-MCNC: 145 MG/DL (ref 74–99)
GLUCOSE BLD MANUAL STRIP-MCNC: 145 MG/DL (ref 74–99)
GLUCOSE BLD MANUAL STRIP-MCNC: 146 MG/DL (ref 74–99)
GLUCOSE BLD MANUAL STRIP-MCNC: 147 MG/DL (ref 74–99)
GLUCOSE BLD MANUAL STRIP-MCNC: 149 MG/DL (ref 74–99)
GLUCOSE BLD MANUAL STRIP-MCNC: 150 MG/DL (ref 74–99)
GLUCOSE BLD MANUAL STRIP-MCNC: 151 MG/DL (ref 74–99)
GLUCOSE BLD MANUAL STRIP-MCNC: 152 MG/DL (ref 74–99)
GLUCOSE BLD MANUAL STRIP-MCNC: 152 MG/DL (ref 74–99)
GLUCOSE BLD MANUAL STRIP-MCNC: 153 MG/DL (ref 74–99)
GLUCOSE BLD MANUAL STRIP-MCNC: 156 MG/DL (ref 74–99)
GLUCOSE BLD MANUAL STRIP-MCNC: 157 MG/DL (ref 74–99)
GLUCOSE BLD MANUAL STRIP-MCNC: 157 MG/DL (ref 74–99)
GLUCOSE BLD MANUAL STRIP-MCNC: 158 MG/DL (ref 74–99)
GLUCOSE BLD MANUAL STRIP-MCNC: 159 MG/DL (ref 74–99)
GLUCOSE BLD MANUAL STRIP-MCNC: 160 MG/DL (ref 74–99)
GLUCOSE BLD MANUAL STRIP-MCNC: 161 MG/DL (ref 74–99)
GLUCOSE BLD MANUAL STRIP-MCNC: 162 MG/DL (ref 74–99)
GLUCOSE BLD MANUAL STRIP-MCNC: 163 MG/DL (ref 74–99)
GLUCOSE BLD MANUAL STRIP-MCNC: 164 MG/DL (ref 74–99)
GLUCOSE BLD MANUAL STRIP-MCNC: 165 MG/DL (ref 74–99)
GLUCOSE BLD MANUAL STRIP-MCNC: 166 MG/DL (ref 74–99)
GLUCOSE BLD MANUAL STRIP-MCNC: 167 MG/DL (ref 74–99)
GLUCOSE BLD MANUAL STRIP-MCNC: 168 MG/DL (ref 74–99)
GLUCOSE BLD MANUAL STRIP-MCNC: 170 MG/DL (ref 74–99)
GLUCOSE BLD MANUAL STRIP-MCNC: 171 MG/DL (ref 74–99)
GLUCOSE BLD MANUAL STRIP-MCNC: 172 MG/DL (ref 74–99)
GLUCOSE BLD MANUAL STRIP-MCNC: 173 MG/DL (ref 74–99)
GLUCOSE BLD MANUAL STRIP-MCNC: 174 MG/DL (ref 74–99)
GLUCOSE BLD MANUAL STRIP-MCNC: 175 MG/DL (ref 74–99)
GLUCOSE BLD MANUAL STRIP-MCNC: 175 MG/DL (ref 74–99)
GLUCOSE BLD MANUAL STRIP-MCNC: 176 MG/DL (ref 74–99)
GLUCOSE BLD MANUAL STRIP-MCNC: 176 MG/DL (ref 74–99)
GLUCOSE BLD MANUAL STRIP-MCNC: 177 MG/DL (ref 74–99)
GLUCOSE BLD MANUAL STRIP-MCNC: 177 MG/DL (ref 74–99)
GLUCOSE BLD MANUAL STRIP-MCNC: 178 MG/DL (ref 74–99)
GLUCOSE BLD MANUAL STRIP-MCNC: 178 MG/DL (ref 74–99)
GLUCOSE BLD MANUAL STRIP-MCNC: 180 MG/DL (ref 74–99)
GLUCOSE BLD MANUAL STRIP-MCNC: 181 MG/DL (ref 74–99)
GLUCOSE BLD MANUAL STRIP-MCNC: 181 MG/DL (ref 74–99)
GLUCOSE BLD MANUAL STRIP-MCNC: 182 MG/DL (ref 74–99)
GLUCOSE BLD MANUAL STRIP-MCNC: 183 MG/DL (ref 74–99)
GLUCOSE BLD MANUAL STRIP-MCNC: 184 MG/DL (ref 74–99)
GLUCOSE BLD MANUAL STRIP-MCNC: 186 MG/DL (ref 74–99)
GLUCOSE BLD MANUAL STRIP-MCNC: 186 MG/DL (ref 74–99)
GLUCOSE BLD MANUAL STRIP-MCNC: 187 MG/DL (ref 74–99)
GLUCOSE BLD MANUAL STRIP-MCNC: 188 MG/DL (ref 74–99)
GLUCOSE BLD MANUAL STRIP-MCNC: 190 MG/DL (ref 74–99)
GLUCOSE BLD MANUAL STRIP-MCNC: 191 MG/DL (ref 74–99)
GLUCOSE BLD MANUAL STRIP-MCNC: 192 MG/DL (ref 74–99)
GLUCOSE BLD MANUAL STRIP-MCNC: 193 MG/DL (ref 74–99)
GLUCOSE BLD MANUAL STRIP-MCNC: 194 MG/DL (ref 74–99)
GLUCOSE BLD MANUAL STRIP-MCNC: 195 MG/DL (ref 74–99)
GLUCOSE BLD MANUAL STRIP-MCNC: 196 MG/DL (ref 74–99)
GLUCOSE BLD MANUAL STRIP-MCNC: 197 MG/DL (ref 74–99)
GLUCOSE BLD MANUAL STRIP-MCNC: 199 MG/DL (ref 74–99)
GLUCOSE BLD MANUAL STRIP-MCNC: 200 MG/DL (ref 74–99)
GLUCOSE BLD MANUAL STRIP-MCNC: 200 MG/DL (ref 74–99)
GLUCOSE BLD MANUAL STRIP-MCNC: 201 MG/DL (ref 74–99)
GLUCOSE BLD MANUAL STRIP-MCNC: 202 MG/DL (ref 74–99)
GLUCOSE BLD MANUAL STRIP-MCNC: 205 MG/DL (ref 74–99)
GLUCOSE BLD MANUAL STRIP-MCNC: 205 MG/DL (ref 74–99)
GLUCOSE BLD MANUAL STRIP-MCNC: 206 MG/DL (ref 74–99)
GLUCOSE BLD MANUAL STRIP-MCNC: 209 MG/DL (ref 74–99)
GLUCOSE BLD MANUAL STRIP-MCNC: 209 MG/DL (ref 74–99)
GLUCOSE BLD MANUAL STRIP-MCNC: 212 MG/DL (ref 74–99)
GLUCOSE BLD MANUAL STRIP-MCNC: 213 MG/DL (ref 74–99)
GLUCOSE BLD MANUAL STRIP-MCNC: 214 MG/DL (ref 74–99)
GLUCOSE BLD MANUAL STRIP-MCNC: 215 MG/DL (ref 74–99)
GLUCOSE BLD MANUAL STRIP-MCNC: 216 MG/DL (ref 74–99)
GLUCOSE BLD MANUAL STRIP-MCNC: 217 MG/DL (ref 74–99)
GLUCOSE BLD MANUAL STRIP-MCNC: 218 MG/DL (ref 74–99)
GLUCOSE BLD MANUAL STRIP-MCNC: 219 MG/DL (ref 74–99)
GLUCOSE BLD MANUAL STRIP-MCNC: 220 MG/DL (ref 74–99)
GLUCOSE BLD MANUAL STRIP-MCNC: 220 MG/DL (ref 74–99)
GLUCOSE BLD MANUAL STRIP-MCNC: 224 MG/DL (ref 74–99)
GLUCOSE BLD MANUAL STRIP-MCNC: 229 MG/DL (ref 74–99)
GLUCOSE BLD MANUAL STRIP-MCNC: 235 MG/DL (ref 74–99)
GLUCOSE BLD MANUAL STRIP-MCNC: 239 MG/DL (ref 74–99)
GLUCOSE BLD MANUAL STRIP-MCNC: 239 MG/DL (ref 74–99)
GLUCOSE BLD MANUAL STRIP-MCNC: 246 MG/DL (ref 74–99)
GLUCOSE BLD MANUAL STRIP-MCNC: 248 MG/DL (ref 74–99)
GLUCOSE BLD MANUAL STRIP-MCNC: 248 MG/DL (ref 74–99)
GLUCOSE BLD MANUAL STRIP-MCNC: 250 MG/DL (ref 74–99)
GLUCOSE BLD MANUAL STRIP-MCNC: 250 MG/DL (ref 74–99)
GLUCOSE BLD MANUAL STRIP-MCNC: 262 MG/DL (ref 74–99)
GLUCOSE BLD MANUAL STRIP-MCNC: 283 MG/DL (ref 74–99)
GLUCOSE BLD MANUAL STRIP-MCNC: 286 MG/DL (ref 74–99)
GLUCOSE BLD MANUAL STRIP-MCNC: 295 MG/DL (ref 74–99)
GLUCOSE BLD MANUAL STRIP-MCNC: 300 MG/DL (ref 74–99)
GLUCOSE BLD MANUAL STRIP-MCNC: 71 MG/DL (ref 74–99)
GLUCOSE BLD MANUAL STRIP-MCNC: 73 MG/DL (ref 74–99)
GLUCOSE BLD MANUAL STRIP-MCNC: 78 MG/DL (ref 74–99)
GLUCOSE BLD MANUAL STRIP-MCNC: 87 MG/DL (ref 74–99)
GLUCOSE BLD MANUAL STRIP-MCNC: 95 MG/DL (ref 74–99)
GLUCOSE BLD MANUAL STRIP-MCNC: 96 MG/DL (ref 74–99)
GLUCOSE BLD MANUAL STRIP-MCNC: 99 MG/DL (ref 74–99)
GLUCOSE BLD MANUAL STRIP-MCNC: 99 MG/DL (ref 74–99)
GLUCOSE BLDV-MCNC: 131 MG/DL (ref 74–99)
GLUCOSE BLDV-MCNC: 187 MG/DL (ref 74–99)
GLUCOSE BLDV-MCNC: 253 MG/DL (ref 74–99)
GLUCOSE FLD-MCNC: 173 MG/DL
GLUCOSE SERPL-MCNC: 100 MG/DL (ref 65–99)
GLUCOSE SERPL-MCNC: 124 MG/DL (ref 65–99)
GLUCOSE SERPL-MCNC: 130 MG/DL (ref 65–99)
GLUCOSE SERPL-MCNC: 133 MG/DL (ref 65–99)
GLUCOSE SERPL-MCNC: 134 MG/DL (ref 65–99)
GLUCOSE SERPL-MCNC: 134 MG/DL (ref 65–99)
GLUCOSE SERPL-MCNC: 138 MG/DL (ref 65–99)
GLUCOSE SERPL-MCNC: 150 MG/DL (ref 65–99)
GLUCOSE SERPL-MCNC: 155 MG/DL (ref 65–99)
GLUCOSE SERPL-MCNC: 158 MG/DL (ref 65–99)
GLUCOSE SERPL-MCNC: 159 MG/DL (ref 65–99)
GLUCOSE SERPL-MCNC: 160 MG/DL (ref 65–99)
GLUCOSE SERPL-MCNC: 164 MG/DL (ref 65–99)
GLUCOSE SERPL-MCNC: 164 MG/DL (ref 65–99)
GLUCOSE SERPL-MCNC: 166 MG/DL (ref 65–99)
GLUCOSE SERPL-MCNC: 178 MG/DL (ref 65–99)
GLUCOSE SERPL-MCNC: 179 MG/DL (ref 65–99)
GLUCOSE SERPL-MCNC: 183 MG/DL (ref 65–99)
GLUCOSE SERPL-MCNC: 184 MG/DL (ref 65–99)
GLUCOSE SERPL-MCNC: 186 MG/DL (ref 65–99)
GLUCOSE SERPL-MCNC: 188 MG/DL (ref 65–99)
GLUCOSE SERPL-MCNC: 189 MG/DL (ref 65–99)
GLUCOSE SERPL-MCNC: 190 MG/DL (ref 65–99)
GLUCOSE SERPL-MCNC: 192 MG/DL (ref 65–99)
GLUCOSE SERPL-MCNC: 193 MG/DL (ref 65–99)
GLUCOSE SERPL-MCNC: 195 MG/DL (ref 65–99)
GLUCOSE SERPL-MCNC: 200 MG/DL (ref 65–99)
GLUCOSE SERPL-MCNC: 202 MG/DL (ref 65–99)
GLUCOSE SERPL-MCNC: 207 MG/DL (ref 65–99)
GLUCOSE SERPL-MCNC: 209 MG/DL (ref 65–99)
GLUCOSE SERPL-MCNC: 210 MG/DL (ref 65–99)
GLUCOSE SERPL-MCNC: 216 MG/DL (ref 65–99)
GLUCOSE SERPL-MCNC: 217 MG/DL (ref 65–99)
GLUCOSE SERPL-MCNC: 219 MG/DL (ref 65–99)
GLUCOSE SERPL-MCNC: 230 MG/DL (ref 65–99)
GLUCOSE SERPL-MCNC: 233 MG/DL (ref 65–99)
GLUCOSE SERPL-MCNC: 237 MG/DL (ref 65–99)
GLUCOSE SERPL-MCNC: 240 MG/DL (ref 65–99)
GLUCOSE SERPL-MCNC: 242 MG/DL (ref 65–99)
GLUCOSE SERPL-MCNC: 251 MG/DL (ref 65–99)
GLUCOSE SERPL-MCNC: 256 MG/DL (ref 65–99)
GLUCOSE SERPL-MCNC: 281 MG/DL (ref 65–99)
GLUCOSE SERPL-MCNC: 335 MG/DL (ref 65–99)
GLUCOSE UR STRIP.AUTO-MCNC: NORMAL MG/DL
GRAM STN SPEC: ABNORMAL
HBV CORE AB SER QL: NONREACTIVE
HBV CORE IGM SER QL: NONREACTIVE
HBV E AB SERPL QL IA: NEGATIVE
HBV SURFACE AB SER-ACNC: 4.4 MIU/ML
HBV SURFACE AG SERPL QL IA: NONREACTIVE
HCO3 BLDV-SCNC: 17.8 MMOL/L (ref 22–26)
HCO3 BLDV-SCNC: 21.3 MMOL/L (ref 22–26)
HCO3 BLDV-SCNC: 24.1 MMOL/L (ref 22–26)
HCT VFR BLD AUTO: 21.1 % (ref 36–46)
HCT VFR BLD AUTO: 25.3 % (ref 36–46)
HCT VFR BLD AUTO: 25.4 % (ref 36–46)
HCT VFR BLD AUTO: 25.5 % (ref 36–46)
HCT VFR BLD AUTO: 25.6 % (ref 36–46)
HCT VFR BLD AUTO: 25.7 % (ref 36–46)
HCT VFR BLD AUTO: 26.1 % (ref 36–46)
HCT VFR BLD AUTO: 26.1 % (ref 36–46)
HCT VFR BLD AUTO: 26.2 % (ref 36–46)
HCT VFR BLD AUTO: 26.4 % (ref 36–46)
HCT VFR BLD AUTO: 26.4 % (ref 36–46)
HCT VFR BLD AUTO: 26.5 % (ref 36–46)
HCT VFR BLD AUTO: 26.5 % (ref 36–46)
HCT VFR BLD AUTO: 27.6 % (ref 36–46)
HCT VFR BLD AUTO: 27.7 % (ref 36–46)
HCT VFR BLD AUTO: 28.1 % (ref 36–46)
HCT VFR BLD AUTO: 28.3 % (ref 36–46)
HCT VFR BLD AUTO: 28.5 % (ref 36–46)
HCT VFR BLD AUTO: 28.6 % (ref 36–46)
HCT VFR BLD AUTO: 28.9 % (ref 36–46)
HCT VFR BLD AUTO: 28.9 % (ref 36–46)
HCT VFR BLD AUTO: 29.1 % (ref 36–46)
HCT VFR BLD AUTO: 29.2 % (ref 36–46)
HCT VFR BLD AUTO: 29.3 % (ref 36–46)
HCT VFR BLD AUTO: 29.3 % (ref 36–46)
HCT VFR BLD AUTO: 29.6 % (ref 36–46)
HCT VFR BLD AUTO: 29.7 % (ref 36–46)
HCT VFR BLD AUTO: 29.7 % (ref 36–46)
HCT VFR BLD AUTO: 29.8 % (ref 36–46)
HCT VFR BLD AUTO: 29.8 % (ref 36–46)
HCT VFR BLD AUTO: 30 % (ref 36–46)
HCT VFR BLD AUTO: 30.1 % (ref 36–46)
HCT VFR BLD AUTO: 30.2 % (ref 36–46)
HCT VFR BLD AUTO: 30.4 % (ref 36–46)
HCT VFR BLD AUTO: 30.6 % (ref 36–46)
HCT VFR BLD AUTO: 31.4 % (ref 36–46)
HCT VFR BLD AUTO: 32.3 % (ref 36–46)
HCT VFR BLD AUTO: 32.4 % (ref 36–46)
HCT VFR BLD AUTO: 32.5 % (ref 36–46)
HCT VFR BLD AUTO: 36.5 % (ref 36–46)
HCT VFR BLD AUTO: 37.5 % (ref 36–46)
HCT VFR BLD AUTO: 37.8 % (ref 36–46)
HCT VFR BLD AUTO: NORMAL %
HCT VFR BLD EST: 26 % (ref 36–46)
HCT VFR BLD EST: 29 % (ref 36–46)
HCT VFR BLD EST: 35 % (ref 36–46)
HGB BLD-MCNC: 10.2 G/DL (ref 12–16)
HGB BLD-MCNC: 10.7 G/DL (ref 12–16)
HGB BLD-MCNC: 10.7 G/DL (ref 12–16)
HGB BLD-MCNC: 11.5 G/DL (ref 12–16)
HGB BLD-MCNC: 11.6 G/DL (ref 12–16)
HGB BLD-MCNC: 12 G/DL (ref 12–16)
HGB BLD-MCNC: 6.7 G/DL (ref 12–16)
HGB BLD-MCNC: 7.8 G/DL (ref 12–16)
HGB BLD-MCNC: 7.9 G/DL (ref 12–16)
HGB BLD-MCNC: 8 G/DL (ref 12–16)
HGB BLD-MCNC: 8.1 G/DL (ref 12–16)
HGB BLD-MCNC: 8.2 G/DL (ref 12–16)
HGB BLD-MCNC: 8.3 G/DL (ref 12–16)
HGB BLD-MCNC: 8.4 G/DL (ref 12–16)
HGB BLD-MCNC: 8.6 G/DL (ref 12–16)
HGB BLD-MCNC: 8.7 G/DL (ref 12–16)
HGB BLD-MCNC: 8.8 G/DL (ref 12–16)
HGB BLD-MCNC: 8.9 G/DL (ref 12–16)
HGB BLD-MCNC: 8.9 G/DL (ref 12–16)
HGB BLD-MCNC: 9 G/DL (ref 12–16)
HGB BLD-MCNC: 9.1 G/DL (ref 12–16)
HGB BLD-MCNC: 9.2 G/DL (ref 12–16)
HGB BLD-MCNC: 9.3 G/DL (ref 12–16)
HGB BLD-MCNC: 9.3 G/DL (ref 12–16)
HGB BLD-MCNC: 9.4 G/DL (ref 12–16)
HGB BLD-MCNC: 9.5 G/DL (ref 12–16)
HGB BLD-MCNC: 9.5 G/DL (ref 12–16)
HGB BLD-MCNC: 9.6 G/DL (ref 12–16)
HGB BLD-MCNC: 9.7 G/DL (ref 12–16)
HGB BLD-MCNC: 9.9 G/DL (ref 12–16)
HGB BLD-MCNC: NORMAL G/DL
HGB BLDV-MCNC: 11.5 G/DL (ref 12–16)
HGB BLDV-MCNC: 8.5 G/DL (ref 12–16)
HGB BLDV-MCNC: 9.8 G/DL (ref 12–16)
HOLD SPECIMEN: NORMAL
IGA SERPL-MCNC: 799 MG/DL (ref 70–400)
IGG SERPL-MCNC: 1250 MG/DL (ref 700–1600)
IGM SERPL-MCNC: 197 MG/DL (ref 40–230)
IMM GRANULOCYTES # BLD AUTO: 0.03 X10*3/UL (ref 0–0.5)
IMM GRANULOCYTES # BLD AUTO: 0.03 X10*3/UL (ref 0–0.5)
IMM GRANULOCYTES # BLD AUTO: 0.04 X10*3/UL (ref 0–0.5)
IMM GRANULOCYTES # BLD AUTO: 0.04 X10*3/UL (ref 0–0.5)
IMM GRANULOCYTES # BLD AUTO: 0.06 X10*3/UL (ref 0–0.5)
IMM GRANULOCYTES # BLD AUTO: 0.09 X10*3/UL (ref 0–0.5)
IMM GRANULOCYTES # BLD AUTO: 0.09 X10*3/UL (ref 0–0.5)
IMM GRANULOCYTES # BLD AUTO: 0.1 X10*3/UL (ref 0–0.5)
IMM GRANULOCYTES # BLD AUTO: 0.12 X10*3/UL (ref 0–0.5)
IMM GRANULOCYTES # BLD AUTO: 0.12 X10*3/UL (ref 0–0.5)
IMM GRANULOCYTES # BLD AUTO: 0.19 X10*3/UL (ref 0–0.5)
IMM GRANULOCYTES # BLD AUTO: 0.19 X10*3/UL (ref 0–0.5)
IMM GRANULOCYTES # BLD AUTO: 0.2 X10*3/UL (ref 0–0.5)
IMM GRANULOCYTES # BLD AUTO: 0.23 X10*3/UL (ref 0–0.5)
IMM GRANULOCYTES # BLD AUTO: NORMAL 10*3/UL
IMM GRANULOCYTES NFR BLD AUTO: 0.3 % (ref 0–0.9)
IMM GRANULOCYTES NFR BLD AUTO: 0.3 % (ref 0–0.9)
IMM GRANULOCYTES NFR BLD AUTO: 0.4 % (ref 0–0.9)
IMM GRANULOCYTES NFR BLD AUTO: 0.4 % (ref 0–0.9)
IMM GRANULOCYTES NFR BLD AUTO: 0.7 % (ref 0–0.9)
IMM GRANULOCYTES NFR BLD AUTO: 0.8 % (ref 0–0.9)
IMM GRANULOCYTES NFR BLD AUTO: 0.8 % (ref 0–0.9)
IMM GRANULOCYTES NFR BLD AUTO: 0.9 % (ref 0–0.9)
IMM GRANULOCYTES NFR BLD AUTO: 1 % (ref 0–0.9)
IMM GRANULOCYTES NFR BLD AUTO: 1.1 % (ref 0–0.9)
IMM GRANULOCYTES NFR BLD AUTO: 1.1 % (ref 0–0.9)
IMM GRANULOCYTES NFR BLD AUTO: 1.2 % (ref 0–0.9)
IMM GRANULOCYTES NFR BLD AUTO: NORMAL %
IMMATURE GRANULOCYTES IN FLUID: 0 %
IMMUNOFIXATION COMMENT: ABNORMAL
INHALED O2 CONCENTRATION: 2 %
INHALED O2 CONCENTRATION: 30 %
INR PPP: 1.3 (ref 0.9–1.2)
IRON SATN MFR SERPL: 22 % (ref 12–50)
IRON SERPL-MCNC: 21 UG/DL (ref 30–160)
KETONES UR STRIP.AUTO-MCNC: NEGATIVE MG/DL
LABORATORY COMMENT REPORT: NORMAL
LABORATORY COMMENT REPORT: NORMAL
LACTATE BLDV-SCNC: 1.6 MMOL/L (ref 0.4–2)
LACTATE BLDV-SCNC: 1.6 MMOL/L (ref 0.4–2)
LACTATE BLDV-SCNC: 1.8 MMOL/L (ref 0.4–2)
LACTATE BLDV-SCNC: 2 MMOL/L (ref 0.4–2)
LACTATE BLDV-SCNC: 2 MMOL/L (ref 0.4–2)
LACTATE BLDV-SCNC: 2.1 MMOL/L (ref 0.4–2)
LACTATE BLDV-SCNC: 2.1 MMOL/L (ref 0.4–2)
LACTATE BLDV-SCNC: 2.3 MMOL/L (ref 0.4–2)
LACTATE BLDV-SCNC: 8.5 MMOL/L (ref 0.4–2)
LDH FLD L TO P-CCNC: 64 U/L
LDH SERPL-CCNC: 345 U/L (ref 91–227)
LEFT ATRIUM VOLUME AREA LENGTH INDEX BSA: 31.3 ML/M2
LEFT VENTRICLE INTERNAL DIMENSION DIASTOLE: 2.98 CM (ref 3.5–6)
LEFT VENTRICLE INTERNAL DIMENSION DIASTOLE: 3.66 CM (ref 3.5–6)
LEFT VENTRICULAR OUTFLOW TRACT DIAMETER: 1.9 CM
LEFT VENTRICULAR OUTFLOW TRACT DIAMETER: 1.9 CM
LEUKOCYTE ESTERASE UR QL STRIP.AUTO: ABNORMAL
LYMPHOCYTES # BLD AUTO: 1.27 X10*3/UL (ref 0.8–3)
LYMPHOCYTES # BLD AUTO: 1.52 X10*3/UL (ref 0.8–3)
LYMPHOCYTES # BLD AUTO: 1.89 X10*3/UL (ref 0.8–3)
LYMPHOCYTES # BLD AUTO: 2.04 X10*3/UL (ref 0.8–3)
LYMPHOCYTES # BLD AUTO: 2.07 X10*3/UL (ref 0.8–3)
LYMPHOCYTES # BLD AUTO: 2.16 X10*3/UL (ref 0.8–3)
LYMPHOCYTES # BLD AUTO: 2.33 X10*3/UL (ref 0.8–3)
LYMPHOCYTES # BLD AUTO: 2.37 X10*3/UL (ref 0.8–3)
LYMPHOCYTES # BLD AUTO: 2.71 X10*3/UL (ref 0.8–3)
LYMPHOCYTES # BLD AUTO: 3.09 X10*3/UL (ref 0.8–3)
LYMPHOCYTES # BLD AUTO: 3.28 X10*3/UL (ref 0.8–3)
LYMPHOCYTES # BLD AUTO: 3.47 X10*3/UL (ref 0.8–3)
LYMPHOCYTES # BLD AUTO: 5.17 X10*3/UL (ref 0.8–3)
LYMPHOCYTES # BLD AUTO: NORMAL 10*3/UL
LYMPHOCYTES # BLD MANUAL: 1.79 X10*3/UL (ref 0.8–3)
LYMPHOCYTES NFR BLD AUTO: 12.9 %
LYMPHOCYTES NFR BLD AUTO: 14.4 %
LYMPHOCYTES NFR BLD AUTO: 14.6 %
LYMPHOCYTES NFR BLD AUTO: 15 %
LYMPHOCYTES NFR BLD AUTO: 16.3 %
LYMPHOCYTES NFR BLD AUTO: 19.3 %
LYMPHOCYTES NFR BLD AUTO: 19.5 %
LYMPHOCYTES NFR BLD AUTO: 20.3 %
LYMPHOCYTES NFR BLD AUTO: 21.4 %
LYMPHOCYTES NFR BLD AUTO: 21.9 %
LYMPHOCYTES NFR BLD AUTO: 22.6 %
LYMPHOCYTES NFR BLD AUTO: 23.1 %
LYMPHOCYTES NFR BLD AUTO: 26.1 %
LYMPHOCYTES NFR BLD AUTO: NORMAL %
LYMPHOCYTES NFR BLD MANUAL: 23 %
LYMPHOCYTES NFR FLD MANUAL: 36 %
MAGNESIUM SERPL-MCNC: 1.3 MG/DL (ref 1.6–3.1)
MAGNESIUM SERPL-MCNC: 1.4 MG/DL (ref 1.6–3.1)
MAGNESIUM SERPL-MCNC: 1.4 MG/DL (ref 1.6–3.1)
MAGNESIUM SERPL-MCNC: 1.5 MG/DL (ref 1.6–3.1)
MAGNESIUM SERPL-MCNC: 1.6 MG/DL (ref 1.6–3.1)
MAGNESIUM SERPL-MCNC: 1.7 MG/DL (ref 1.6–3.1)
MAGNESIUM SERPL-MCNC: 1.8 MG/DL (ref 1.6–3.1)
MAGNESIUM SERPL-MCNC: 1.9 MG/DL (ref 1.6–3.1)
MAGNESIUM SERPL-MCNC: 2 MG/DL (ref 1.6–3.1)
MAGNESIUM SERPL-MCNC: 2 MG/DL (ref 1.6–3.1)
MAGNESIUM SERPL-MCNC: 2.1 MG/DL (ref 1.6–3.1)
MAGNESIUM SERPL-MCNC: 2.1 MG/DL (ref 1.6–3.1)
MCH RBC QN AUTO: 28.7 PG (ref 26–34)
MCH RBC QN AUTO: 28.7 PG (ref 26–34)
MCH RBC QN AUTO: 29.1 PG (ref 26–34)
MCH RBC QN AUTO: 29.1 PG (ref 26–34)
MCH RBC QN AUTO: 29.2 PG (ref 26–34)
MCH RBC QN AUTO: 29.4 PG (ref 26–34)
MCH RBC QN AUTO: 29.5 PG (ref 26–34)
MCH RBC QN AUTO: 29.6 PG (ref 26–34)
MCH RBC QN AUTO: 29.6 PG (ref 26–34)
MCH RBC QN AUTO: 29.7 PG (ref 26–34)
MCH RBC QN AUTO: 29.7 PG (ref 26–34)
MCH RBC QN AUTO: 30 PG (ref 26–34)
MCH RBC QN AUTO: 30 PG (ref 26–34)
MCH RBC QN AUTO: 30.2 PG (ref 26–34)
MCH RBC QN AUTO: 30.4 PG (ref 26–34)
MCH RBC QN AUTO: 30.5 PG (ref 26–34)
MCH RBC QN AUTO: 30.7 PG (ref 26–34)
MCH RBC QN AUTO: 30.8 PG (ref 26–34)
MCH RBC QN AUTO: 30.9 PG (ref 26–34)
MCH RBC QN AUTO: 31 PG (ref 26–34)
MCH RBC QN AUTO: 31.1 PG (ref 26–34)
MCH RBC QN AUTO: 31.2 PG (ref 26–34)
MCH RBC QN AUTO: 31.3 PG (ref 26–34)
MCH RBC QN AUTO: 31.4 PG (ref 26–34)
MCH RBC QN AUTO: 31.5 PG (ref 26–34)
MCH RBC QN AUTO: 31.5 PG (ref 26–34)
MCH RBC QN AUTO: 31.8 PG (ref 26–34)
MCH RBC QN AUTO: 34.8 PG (ref 26–34)
MCH RBC QN AUTO: NORMAL PG
MCHC RBC AUTO-ENTMCNC: 30.3 G/DL (ref 32–36)
MCHC RBC AUTO-ENTMCNC: 30.4 G/DL (ref 32–36)
MCHC RBC AUTO-ENTMCNC: 30.7 G/DL (ref 32–36)
MCHC RBC AUTO-ENTMCNC: 30.7 G/DL (ref 32–36)
MCHC RBC AUTO-ENTMCNC: 30.8 G/DL (ref 32–36)
MCHC RBC AUTO-ENTMCNC: 30.9 G/DL (ref 32–36)
MCHC RBC AUTO-ENTMCNC: 31 G/DL (ref 32–36)
MCHC RBC AUTO-ENTMCNC: 31.1 G/DL (ref 32–36)
MCHC RBC AUTO-ENTMCNC: 31.2 G/DL (ref 32–36)
MCHC RBC AUTO-ENTMCNC: 31.2 G/DL (ref 32–36)
MCHC RBC AUTO-ENTMCNC: 31.3 G/DL (ref 32–36)
MCHC RBC AUTO-ENTMCNC: 31.4 G/DL (ref 32–36)
MCHC RBC AUTO-ENTMCNC: 31.5 G/DL (ref 32–36)
MCHC RBC AUTO-ENTMCNC: 31.6 G/DL (ref 32–36)
MCHC RBC AUTO-ENTMCNC: 31.6 G/DL (ref 32–36)
MCHC RBC AUTO-ENTMCNC: 31.7 G/DL (ref 32–36)
MCHC RBC AUTO-ENTMCNC: 31.7 G/DL (ref 32–36)
MCHC RBC AUTO-ENTMCNC: 31.8 G/DL (ref 32–36)
MCHC RBC AUTO-ENTMCNC: 31.8 G/DL (ref 32–36)
MCHC RBC AUTO-ENTMCNC: 31.9 G/DL (ref 32–36)
MCHC RBC AUTO-ENTMCNC: 32 G/DL (ref 32–36)
MCHC RBC AUTO-ENTMCNC: 32 G/DL (ref 32–36)
MCHC RBC AUTO-ENTMCNC: 32.1 G/DL (ref 32–36)
MCHC RBC AUTO-ENTMCNC: 32.2 G/DL (ref 32–36)
MCHC RBC AUTO-ENTMCNC: 32.4 G/DL (ref 32–36)
MCHC RBC AUTO-ENTMCNC: 32.9 G/DL (ref 32–36)
MCHC RBC AUTO-ENTMCNC: 33 G/DL (ref 32–36)
MCHC RBC AUTO-ENTMCNC: 33.1 G/DL (ref 32–36)
MCHC RBC AUTO-ENTMCNC: 33.2 G/DL (ref 32–36)
MCHC RBC AUTO-ENTMCNC: 33.3 G/DL (ref 32–36)
MCHC RBC AUTO-ENTMCNC: 37.4 G/DL (ref 32–36)
MCHC RBC AUTO-ENTMCNC: NORMAL G/DL
MCV RBC AUTO: 100 FL (ref 80–100)
MCV RBC AUTO: 101 FL (ref 80–100)
MCV RBC AUTO: 102 FL (ref 80–100)
MCV RBC AUTO: 87 FL (ref 80–100)
MCV RBC AUTO: 88 FL (ref 80–100)
MCV RBC AUTO: 90 FL (ref 80–100)
MCV RBC AUTO: 90 FL (ref 80–100)
MCV RBC AUTO: 91 FL (ref 80–100)
MCV RBC AUTO: 92 FL (ref 80–100)
MCV RBC AUTO: 93 FL (ref 80–100)
MCV RBC AUTO: 95 FL (ref 80–100)
MCV RBC AUTO: 97 FL (ref 80–100)
MCV RBC AUTO: 98 FL (ref 80–100)
MCV RBC AUTO: 99 FL (ref 80–100)
MCV RBC AUTO: NORMAL FL
MITRAL VALVE E/A RATIO: 1.1
MITRAL VALVE E/A RATIO: 1.32
MONOCYTES # BLD AUTO: 0.55 X10*3/UL (ref 0.05–0.8)
MONOCYTES # BLD AUTO: 0.77 X10*3/UL (ref 0.05–0.8)
MONOCYTES # BLD AUTO: 0.79 X10*3/UL (ref 0.05–0.8)
MONOCYTES # BLD AUTO: 0.88 X10*3/UL (ref 0.05–0.8)
MONOCYTES # BLD AUTO: 1.03 X10*3/UL (ref 0.05–0.8)
MONOCYTES # BLD AUTO: 1.06 X10*3/UL (ref 0.05–0.8)
MONOCYTES # BLD AUTO: 1.08 X10*3/UL (ref 0.05–0.8)
MONOCYTES # BLD AUTO: 1.19 X10*3/UL (ref 0.05–0.8)
MONOCYTES # BLD AUTO: 1.19 X10*3/UL (ref 0.05–0.8)
MONOCYTES # BLD AUTO: 1.28 X10*3/UL (ref 0.05–0.8)
MONOCYTES # BLD AUTO: 1.29 X10*3/UL (ref 0.05–0.8)
MONOCYTES # BLD AUTO: 1.63 X10*3/UL (ref 0.05–0.8)
MONOCYTES # BLD AUTO: 1.86 X10*3/UL (ref 0.05–0.8)
MONOCYTES # BLD AUTO: NORMAL 10*3/UL
MONOCYTES # BLD MANUAL: 0.16 X10*3/UL (ref 0.05–0.8)
MONOCYTES NFR BLD AUTO: 10 %
MONOCYTES NFR BLD AUTO: 10.3 %
MONOCYTES NFR BLD AUTO: 11.2 %
MONOCYTES NFR BLD AUTO: 11.3 %
MONOCYTES NFR BLD AUTO: 6.1 %
MONOCYTES NFR BLD AUTO: 6.6 %
MONOCYTES NFR BLD AUTO: 7.1 %
MONOCYTES NFR BLD AUTO: 7.2 %
MONOCYTES NFR BLD AUTO: 7.2 %
MONOCYTES NFR BLD AUTO: 7.8 %
MONOCYTES NFR BLD AUTO: 7.8 %
MONOCYTES NFR BLD AUTO: 8.1 %
MONOCYTES NFR BLD AUTO: 9.5 %
MONOCYTES NFR BLD AUTO: NORMAL %
MONOCYTES NFR BLD MANUAL: 2 %
MONOS+MACROS NFR FLD MANUAL: 52 %
MRSA DNA SPEC QL NAA+PROBE: NOT DETECTED
NEUTROPHILS # BLD AUTO: 12.32 X10*3/UL (ref 1.6–5.5)
NEUTROPHILS # BLD AUTO: 12.37 X10*3/UL (ref 1.6–5.5)
NEUTROPHILS # BLD AUTO: 15.03 X10*3/UL (ref 1.6–5.5)
NEUTROPHILS # BLD AUTO: 15.81 X10*3/UL (ref 1.6–5.5)
NEUTROPHILS # BLD AUTO: 16.18 X10*3/UL (ref 1.6–5.5)
NEUTROPHILS # BLD AUTO: 5.19 X10*3/UL (ref 1.6–5.5)
NEUTROPHILS # BLD AUTO: 5.34 X10*3/UL (ref 1.6–5.5)
NEUTROPHILS # BLD AUTO: 5.88 X10*3/UL (ref 1.6–5.5)
NEUTROPHILS # BLD AUTO: 6.22 X10*3/UL (ref 1.6–5.5)
NEUTROPHILS # BLD AUTO: 6.47 X10*3/UL (ref 1.6–5.5)
NEUTROPHILS # BLD AUTO: 6.98 X10*3/UL (ref 1.6–5.5)
NEUTROPHILS # BLD AUTO: 7.07 X10*3/UL (ref 1.6–5.5)
NEUTROPHILS # BLD AUTO: 7.24 X10*3/UL (ref 1.6–5.5)
NEUTROPHILS # BLD AUTO: NORMAL 10*3/UL
NEUTROPHILS NFR BLD AUTO: 55.3 %
NEUTROPHILS NFR BLD AUTO: 57 %
NEUTROPHILS NFR BLD AUTO: 58.7 %
NEUTROPHILS NFR BLD AUTO: 60.6 %
NEUTROPHILS NFR BLD AUTO: 62 %
NEUTROPHILS NFR BLD AUTO: 65.5 %
NEUTROPHILS NFR BLD AUTO: 67.5 %
NEUTROPHILS NFR BLD AUTO: 69.5 %
NEUTROPHILS NFR BLD AUTO: 69.7 %
NEUTROPHILS NFR BLD AUTO: 74.6 %
NEUTROPHILS NFR BLD AUTO: 74.9 %
NEUTROPHILS NFR BLD AUTO: 75.7 %
NEUTROPHILS NFR BLD AUTO: 77 %
NEUTROPHILS NFR BLD AUTO: NORMAL %
NEUTROPHILS NFR FLD MANUAL: 12 %
NEUTS SEG # BLD MANUAL: 5.69 X10*3/UL (ref 1.6–5)
NEUTS SEG NFR BLD MANUAL: 73 %
NITRITE UR QL STRIP.AUTO: NEGATIVE
NRBC BLD-RTO: 0 /100 WBCS (ref 0–0)
NRBC BLD-RTO: 0.2 /100 WBCS (ref 0–0)
NRBC BLD-RTO: 0.4 /100 WBCS (ref 0–0)
NRBC BLD-RTO: 0.6 /100 WBCS (ref 0–0)
NRBC BLD-RTO: 0.8 /100 WBCS (ref 0–0)
NRBC BLD-RTO: NORMAL /100{WBCS}
OTHER CELLS NFR FLD MANUAL: 0 %
OVALOCYTES BLD QL SMEAR: ABNORMAL
OVALOCYTES BLD QL SMEAR: NORMAL
OXYHGB MFR BLDV: 41.7 % (ref 45–75)
OXYHGB MFR BLDV: 91.1 % (ref 45–75)
OXYHGB MFR BLDV: 97.3 % (ref 45–75)
P AXIS: 83 DEGREES
P OFFSET: 156 MS
P OFFSET: 172 MS
P ONSET: 102 MS
P ONSET: 125 MS
PAPPENHEIMER BOD BLD QL SMEAR: PRESENT
PAPPENHEIMER BOD BLD QL SMEAR: PRESENT
PATH REPORT.FINAL DX SPEC: NORMAL
PATH REPORT.GROSS SPEC: NORMAL
PATH REPORT.RELEVANT HX SPEC: NORMAL
PATH REPORT.TOTAL CANCER: NORMAL
PATH REVIEW - SERUM IMMUNOFIXATION: ABNORMAL
PATH REVIEW-SERUM PROTEIN ELECTROPHORESIS: ABNORMAL
PCO2 BLDV: 36 MM HG (ref 41–51)
PCO2 BLDV: 49 MM HG (ref 41–51)
PCO2 BLDV: 79 MM HG (ref 41–51)
PH BLDV: 6.96 PH (ref 7.33–7.43)
PH BLDV: 7.3 PH (ref 7.33–7.43)
PH BLDV: 7.38 PH (ref 7.33–7.43)
PH FLD: 7.2 [PH]
PH UR STRIP.AUTO: 6.5 [PH]
PHOSPHATE SERPL-MCNC: 1.6 MG/DL (ref 2.5–4.5)
PHOSPHATE SERPL-MCNC: 1.9 MG/DL (ref 2.5–4.5)
PHOSPHATE SERPL-MCNC: 2 MG/DL (ref 2.5–4.5)
PHOSPHATE SERPL-MCNC: 2 MG/DL (ref 2.5–4.5)
PHOSPHATE SERPL-MCNC: 2.1 MG/DL (ref 2.5–4.5)
PHOSPHATE SERPL-MCNC: 2.2 MG/DL (ref 2.5–4.5)
PHOSPHATE SERPL-MCNC: 2.3 MG/DL (ref 2.5–4.5)
PHOSPHATE SERPL-MCNC: 2.4 MG/DL (ref 2.5–4.5)
PHOSPHATE SERPL-MCNC: 2.4 MG/DL (ref 2.5–4.5)
PHOSPHATE SERPL-MCNC: 2.5 MG/DL (ref 2.5–4.5)
PHOSPHATE SERPL-MCNC: 2.5 MG/DL (ref 2.5–4.5)
PHOSPHATE SERPL-MCNC: 2.6 MG/DL (ref 2.5–4.5)
PHOSPHATE SERPL-MCNC: 2.6 MG/DL (ref 2.5–4.5)
PHOSPHATE SERPL-MCNC: 2.7 MG/DL (ref 2.5–4.5)
PHOSPHATE SERPL-MCNC: 2.8 MG/DL (ref 2.5–4.5)
PHOSPHATE SERPL-MCNC: 2.9 MG/DL (ref 2.5–4.5)
PHOSPHATE SERPL-MCNC: 3.1 MG/DL (ref 2.5–4.5)
PHOSPHATE SERPL-MCNC: 3.1 MG/DL (ref 2.5–4.5)
PHOSPHATE SERPL-MCNC: 4 MG/DL (ref 2.5–4.5)
PHOSPHATE SERPL-MCNC: 4.2 MG/DL (ref 2.5–4.5)
PLASMA CELLS NFR FLD MANUAL: 0 %
PLATELET # BLD AUTO: 137 X10*3/UL (ref 150–450)
PLATELET # BLD AUTO: 139 X10*3/UL (ref 150–450)
PLATELET # BLD AUTO: 141 X10*3/UL (ref 150–450)
PLATELET # BLD AUTO: 149 X10*3/UL (ref 150–450)
PLATELET # BLD AUTO: 150 X10*3/UL (ref 150–450)
PLATELET # BLD AUTO: 151 X10*3/UL (ref 150–450)
PLATELET # BLD AUTO: 155 X10*3/UL (ref 150–450)
PLATELET # BLD AUTO: 169 X10*3/UL (ref 150–450)
PLATELET # BLD AUTO: 169 X10*3/UL (ref 150–450)
PLATELET # BLD AUTO: 173 X10*3/UL (ref 150–450)
PLATELET # BLD AUTO: 181 X10*3/UL (ref 150–450)
PLATELET # BLD AUTO: 191 X10*3/UL (ref 150–450)
PLATELET # BLD AUTO: 192 X10*3/UL (ref 150–450)
PLATELET # BLD AUTO: 198 X10*3/UL (ref 150–450)
PLATELET # BLD AUTO: 199 X10*3/UL (ref 150–450)
PLATELET # BLD AUTO: 199 X10*3/UL (ref 150–450)
PLATELET # BLD AUTO: 200 X10*3/UL (ref 150–450)
PLATELET # BLD AUTO: 204 X10*3/UL (ref 150–450)
PLATELET # BLD AUTO: 209 X10*3/UL (ref 150–450)
PLATELET # BLD AUTO: 211 X10*3/UL (ref 150–450)
PLATELET # BLD AUTO: 213 X10*3/UL (ref 150–450)
PLATELET # BLD AUTO: 215 X10*3/UL (ref 150–450)
PLATELET # BLD AUTO: 218 X10*3/UL (ref 150–450)
PLATELET # BLD AUTO: 224 X10*3/UL (ref 150–450)
PLATELET # BLD AUTO: 225 X10*3/UL (ref 150–450)
PLATELET # BLD AUTO: 225 X10*3/UL (ref 150–450)
PLATELET # BLD AUTO: 226 X10*3/UL (ref 150–450)
PLATELET # BLD AUTO: 227 X10*3/UL (ref 150–450)
PLATELET # BLD AUTO: 228 X10*3/UL (ref 150–450)
PLATELET # BLD AUTO: 228 X10*3/UL (ref 150–450)
PLATELET # BLD AUTO: 233 X10*3/UL (ref 150–450)
PLATELET # BLD AUTO: 239 X10*3/UL (ref 150–450)
PLATELET # BLD AUTO: 243 X10*3/UL (ref 150–450)
PLATELET # BLD AUTO: 247 X10*3/UL (ref 150–450)
PLATELET # BLD AUTO: 259 X10*3/UL (ref 150–450)
PLATELET # BLD AUTO: 264 X10*3/UL (ref 150–450)
PLATELET # BLD AUTO: 271 X10*3/UL (ref 150–450)
PLATELET # BLD AUTO: 272 X10*3/UL (ref 150–450)
PLATELET # BLD AUTO: 355 X10*3/UL (ref 150–450)
PLATELET # BLD AUTO: 417 X10*3/UL (ref 150–450)
PLATELET # BLD AUTO: 424 X10*3/UL (ref 150–450)
PLATELET # BLD AUTO: 96 X10*3/UL (ref 150–450)
PLATELET # BLD AUTO: NORMAL 10*3/UL
PLATELET CLUMP BLD QL SMEAR: PRESENT
PO2 BLDV: 149 MM HG (ref 35–45)
PO2 BLDV: 38 MM HG (ref 35–45)
PO2 BLDV: 65 MM HG (ref 35–45)
POLYCHROMASIA BLD QL SMEAR: ABNORMAL
POLYCHROMASIA BLD QL SMEAR: NORMAL
POTASSIUM BLDV-SCNC: 3.7 MMOL/L (ref 3.5–5.3)
POTASSIUM BLDV-SCNC: 3.9 MMOL/L (ref 3.5–5.3)
POTASSIUM BLDV-SCNC: 4.2 MMOL/L (ref 3.5–5.3)
POTASSIUM SERPL-SCNC: 2.6 MMOL/L (ref 3.4–5.1)
POTASSIUM SERPL-SCNC: 3 MMOL/L (ref 3.4–5.1)
POTASSIUM SERPL-SCNC: 3.2 MMOL/L (ref 3.4–5.1)
POTASSIUM SERPL-SCNC: 3.2 MMOL/L (ref 3.4–5.1)
POTASSIUM SERPL-SCNC: 3.3 MMOL/L (ref 3.4–5.1)
POTASSIUM SERPL-SCNC: 3.4 MMOL/L (ref 3.4–5.1)
POTASSIUM SERPL-SCNC: 3.5 MMOL/L (ref 3.4–5.1)
POTASSIUM SERPL-SCNC: 3.6 MMOL/L (ref 3.4–5.1)
POTASSIUM SERPL-SCNC: 3.7 MMOL/L (ref 3.4–5.1)
POTASSIUM SERPL-SCNC: 3.8 MMOL/L (ref 3.4–5.1)
POTASSIUM SERPL-SCNC: 3.9 MMOL/L (ref 3.4–5.1)
POTASSIUM SERPL-SCNC: 4 MMOL/L (ref 3.4–5.1)
POTASSIUM SERPL-SCNC: 4 MMOL/L (ref 3.4–5.1)
POTASSIUM SERPL-SCNC: 4.1 MMOL/L (ref 3.4–5.1)
POTASSIUM SERPL-SCNC: 4.1 MMOL/L (ref 3.4–5.1)
POTASSIUM SERPL-SCNC: 4.2 MMOL/L (ref 3.4–5.1)
POTASSIUM SERPL-SCNC: 4.3 MMOL/L (ref 3.4–5.1)
POTASSIUM SERPL-SCNC: 4.5 MMOL/L (ref 3.4–5.1)
POTASSIUM SERPL-SCNC: 4.9 MMOL/L (ref 3.4–5.1)
PR INTERVAL: 180 MS
PR INTERVAL: 208 MS
PREALB SERPL-MCNC: <3 MG/DL (ref 18–40)
PREALB SERPL-MCNC: <3 MG/DL (ref 18–40)
PROCALCITONIN SERPL-MCNC: 2.12 NG/ML
PRODUCT BLOOD TYPE: 5100
PRODUCT BLOOD TYPE: 5100
PRODUCT CODE: NORMAL
PRODUCT CODE: NORMAL
PROT FLD-MCNC: 1.3 G/DL
PROT SERPL-MCNC: 3.6 G/DL (ref 5.9–7.9)
PROT SERPL-MCNC: 4.6 G/DL (ref 5.9–7.9)
PROT SERPL-MCNC: 4.8 G/DL (ref 5.9–7.9)
PROT SERPL-MCNC: 4.9 G/DL (ref 5.9–7.9)
PROT SERPL-MCNC: 5 G/DL (ref 5.9–7.9)
PROT SERPL-MCNC: 5.1 G/DL (ref 5.9–7.9)
PROT SERPL-MCNC: 5.2 G/DL (ref 5.9–7.9)
PROT SERPL-MCNC: 5.3 G/DL (ref 5.9–7.9)
PROT SERPL-MCNC: 5.4 G/DL (ref 5.9–7.9)
PROT SERPL-MCNC: 5.4 G/DL (ref 5.9–7.9)
PROT SERPL-MCNC: 5.5 G/DL (ref 5.9–7.9)
PROT SERPL-MCNC: 5.6 G/DL (ref 5.9–7.9)
PROT SERPL-MCNC: 5.6 G/DL (ref 5.9–7.9)
PROT SERPL-MCNC: 5.7 G/DL (ref 5.9–7.9)
PROT SERPL-MCNC: 5.7 G/DL (ref 5.9–7.9)
PROT SERPL-MCNC: 6 G/DL (ref 6.4–8.2)
PROT UR STRIP.AUTO-MCNC: ABNORMAL MG/DL
PROTEIN ELECTROPHORESIS COMMENT: ABNORMAL
PROTHROMBIN TIME: 13.9 SECONDS (ref 9.3–12.7)
Q ONSET: 192 MS
Q ONSET: 229 MS
QRS COUNT: 15 BEATS
QRS COUNT: 18 BEATS
QRS DURATION: 138 MS
QRS DURATION: 140 MS
QT INTERVAL: 414 MS
QT INTERVAL: 490 MS
QTC CALCULATION(BAZETT): 549 MS
QTC CALCULATION(BAZETT): 596 MS
QTC FREDERICIA: 500 MS
QTC FREDERICIA: 558 MS
R AXIS: 151 DEGREES
R AXIS: 166 DEGREES
RBC # BLD AUTO: 2.27 X10*6/UL (ref 4–5.2)
RBC # BLD AUTO: 2.5 X10*6/UL (ref 4–5.2)
RBC # BLD AUTO: 2.52 X10*6/UL (ref 4–5.2)
RBC # BLD AUTO: 2.56 X10*6/UL (ref 4–5.2)
RBC # BLD AUTO: 2.56 X10*6/UL (ref 4–5.2)
RBC # BLD AUTO: 2.57 X10*6/UL (ref 4–5.2)
RBC # BLD AUTO: 2.58 X10*6/UL (ref 4–5.2)
RBC # BLD AUTO: 2.59 X10*6/UL (ref 4–5.2)
RBC # BLD AUTO: 2.61 X10*6/UL (ref 4–5.2)
RBC # BLD AUTO: 2.64 X10*6/UL (ref 4–5.2)
RBC # BLD AUTO: 2.67 X10*6/UL (ref 4–5.2)
RBC # BLD AUTO: 2.76 X10*6/UL (ref 4–5.2)
RBC # BLD AUTO: 2.79 X10*6/UL (ref 4–5.2)
RBC # BLD AUTO: 2.8 X10*6/UL (ref 4–5.2)
RBC # BLD AUTO: 2.82 X10*6/UL (ref 4–5.2)
RBC # BLD AUTO: 2.88 X10*6/UL (ref 4–5.2)
RBC # BLD AUTO: 2.9 X10*6/UL (ref 4–5.2)
RBC # BLD AUTO: 2.91 X10*6/UL (ref 4–5.2)
RBC # BLD AUTO: 2.93 X10*6/UL (ref 4–5.2)
RBC # BLD AUTO: 2.98 X10*6/UL (ref 4–5.2)
RBC # BLD AUTO: 3 X10*6/UL (ref 4–5.2)
RBC # BLD AUTO: 3.01 X10*6/UL (ref 4–5.2)
RBC # BLD AUTO: 3.1 X10*6/UL (ref 4–5.2)
RBC # BLD AUTO: 3.13 X10*6/UL (ref 4–5.2)
RBC # BLD AUTO: 3.13 X10*6/UL (ref 4–5.2)
RBC # BLD AUTO: 3.17 X10*6/UL (ref 4–5.2)
RBC # BLD AUTO: 3.18 X10*6/UL (ref 4–5.2)
RBC # BLD AUTO: 3.2 X10*6/UL (ref 4–5.2)
RBC # BLD AUTO: 3.2 X10*6/UL (ref 4–5.2)
RBC # BLD AUTO: 3.25 X10*6/UL (ref 4–5.2)
RBC # BLD AUTO: 3.27 X10*6/UL (ref 4–5.2)
RBC # BLD AUTO: 3.3 X10*6/UL (ref 4–5.2)
RBC # BLD AUTO: 3.35 X10*6/UL (ref 4–5.2)
RBC # BLD AUTO: 3.38 X10*6/UL (ref 4–5.2)
RBC # BLD AUTO: 3.56 X10*6/UL (ref 4–5.2)
RBC # BLD AUTO: 3.62 X10*6/UL (ref 4–5.2)
RBC # BLD AUTO: 3.68 X10*6/UL (ref 4–5.2)
RBC # BLD AUTO: 3.91 X10*6/UL (ref 4–5.2)
RBC # BLD AUTO: 3.93 X10*6/UL (ref 4–5.2)
RBC # BLD AUTO: 4.11 X10*6/UL (ref 4–5.2)
RBC # BLD AUTO: NORMAL 10*6/UL
RBC # FLD AUTO: 1000 /UL
RBC # UR STRIP.AUTO: ABNORMAL /UL
RBC #/AREA URNS AUTO: >20 /HPF
RBC MORPH BLD: ABNORMAL
RBC MORPH BLD: NORMAL
RH FACTOR (ANTIGEN D): NORMAL
RH FACTOR (ANTIGEN D): NORMAL
SAO2 % BLDV: 100 % (ref 45–75)
SAO2 % BLDV: 42 % (ref 45–75)
SAO2 % BLDV: 94 % (ref 45–75)
SARS-COV-2 RNA RESP QL NAA+PROBE: NOT DETECTED
SCAN RESULT: NORMAL
SODIUM BLDV-SCNC: 129 MMOL/L (ref 136–145)
SODIUM BLDV-SCNC: 130 MMOL/L (ref 136–145)
SODIUM BLDV-SCNC: 133 MMOL/L (ref 136–145)
SODIUM SERPL-SCNC: 127 MMOL/L (ref 133–145)
SODIUM SERPL-SCNC: 128 MMOL/L (ref 133–145)
SODIUM SERPL-SCNC: 129 MMOL/L (ref 133–145)
SODIUM SERPL-SCNC: 130 MMOL/L (ref 133–145)
SODIUM SERPL-SCNC: 131 MMOL/L (ref 133–145)
SODIUM SERPL-SCNC: 132 MMOL/L (ref 133–145)
SODIUM SERPL-SCNC: 133 MMOL/L (ref 133–145)
SODIUM SERPL-SCNC: 134 MMOL/L (ref 133–145)
SODIUM SERPL-SCNC: 135 MMOL/L (ref 133–145)
SODIUM SERPL-SCNC: 136 MMOL/L (ref 133–145)
SODIUM SERPL-SCNC: 137 MMOL/L (ref 133–145)
SODIUM SERPL-SCNC: 137 MMOL/L (ref 133–145)
SP GR UR STRIP.AUTO: 1.03
SPHEROCYTES BLD QL SMEAR: NORMAL
STAPHYLOCOCCUS SPEC CULT: NORMAL
T AXIS: 127 DEGREES
T AXIS: 29 DEGREES
T OFFSET: 399 MS
T OFFSET: 474 MS
TARGETS BLD QL SMEAR: ABNORMAL
TARGETS BLD QL SMEAR: NORMAL
TIBC SERPL-MCNC: 97 UG/DL (ref 228–428)
TOTAL CELLS COUNTED BLD: 100
TOTAL CELLS COUNTED FLD: 100
TRIGL FLD-MCNC: 33 MG/DL
TROPONIN T SERPL-MCNC: 331 NG/L
TROPONIN T SERPL-MCNC: 345 NG/L
TSH SERPL DL<=0.05 MIU/L-ACNC: 1.39 MIU/L (ref 0.27–4.2)
TSH SERPL DL<=0.05 MIU/L-ACNC: 6.1 MIU/L (ref 0.27–4.2)
UIBC SERPL-MCNC: 76 UG/DL (ref 110–370)
UNIT ABO: NORMAL
UNIT ABO: NORMAL
UNIT NUMBER: NORMAL
UNIT NUMBER: NORMAL
UNIT RH: NORMAL
UNIT RH: NORMAL
UNIT VOLUME: 350
UNIT VOLUME: 400
UROBILINOGEN UR STRIP.AUTO-MCNC: NORMAL MG/DL
VANCOMYCIN SERPL-MCNC: 13.9 UG/ML (ref 10–20)
VANCOMYCIN SERPL-MCNC: 19.2 UG/ML (ref 10–20)
VANCOMYCIN SERPL-MCNC: 20 UG/ML (ref 10–20)
VANCOMYCIN SERPL-MCNC: 23.3 UG/ML (ref 10–20)
VANCOMYCIN SERPL-MCNC: 24 UG/ML (ref 10–20)
VANCOMYCIN SERPL-MCNC: 25.5 UG/ML (ref 10–20)
VANCOMYCIN SERPL-MCNC: 27.2 UG/ML (ref 10–20)
VARIANT LYMPHS # BLD MANUAL: 0.16 X10*3/UL (ref 0–0.3)
VARIANT LYMPHS NFR BLD: 2 %
VENTRICULAR RATE: 106 BPM
VENTRICULAR RATE: 89 BPM
WBC # BLD AUTO: 10 X10*3/UL (ref 4.4–11.3)
WBC # BLD AUTO: 10.1 X10*3/UL (ref 4.4–11.3)
WBC # BLD AUTO: 10.3 X10*3/UL (ref 4.4–11.3)
WBC # BLD AUTO: 10.5 X10*3/UL (ref 4.4–11.3)
WBC # BLD AUTO: 10.7 X10*3/UL (ref 4.4–11.3)
WBC # BLD AUTO: 10.7 X10*3/UL (ref 4.4–11.3)
WBC # BLD AUTO: 10.8 X10*3/UL (ref 4.4–11.3)
WBC # BLD AUTO: 11 X10*3/UL (ref 4.4–11.3)
WBC # BLD AUTO: 11.1 X10*3/UL (ref 4.4–11.3)
WBC # BLD AUTO: 11.5 X10*3/UL (ref 4.4–11.3)
WBC # BLD AUTO: 11.7 X10*3/UL (ref 4.4–11.3)
WBC # BLD AUTO: 12 X10*3/UL (ref 4.4–11.3)
WBC # BLD AUTO: 12.3 X10*3/UL (ref 4.4–11.3)
WBC # BLD AUTO: 12.6 X10*3/UL (ref 4.4–11.3)
WBC # BLD AUTO: 12.8 X10*3/UL (ref 4.4–11.3)
WBC # BLD AUTO: 12.8 X10*3/UL (ref 4.4–11.3)
WBC # BLD AUTO: 13.2 X10*3/UL (ref 4.4–11.3)
WBC # BLD AUTO: 13.3 X10*3/UL (ref 4.4–11.3)
WBC # BLD AUTO: 13.3 X10*3/UL (ref 4.4–11.3)
WBC # BLD AUTO: 13.5 X10*3/UL (ref 4.4–11.3)
WBC # BLD AUTO: 13.6 X10*3/UL (ref 4.4–11.3)
WBC # BLD AUTO: 13.7 X10*3/UL (ref 4.4–11.3)
WBC # BLD AUTO: 14.1 X10*3/UL (ref 4.4–11.3)
WBC # BLD AUTO: 14.2 X10*3/UL (ref 4.4–11.3)
WBC # BLD AUTO: 14.4 X10*3/UL (ref 4.4–11.3)
WBC # BLD AUTO: 14.5 X10*3/UL (ref 4.4–11.3)
WBC # BLD AUTO: 14.6 X10*3/UL (ref 4.4–11.3)
WBC # BLD AUTO: 14.9 X10*3/UL (ref 4.4–11.3)
WBC # BLD AUTO: 15.5 X10*3/UL (ref 4.4–11.3)
WBC # BLD AUTO: 15.6 X10*3/UL (ref 4.4–11.3)
WBC # BLD AUTO: 15.9 X10*3/UL (ref 4.4–11.3)
WBC # BLD AUTO: 16.2 X10*3/UL (ref 4.4–11.3)
WBC # BLD AUTO: 16.5 X10*3/UL (ref 4.4–11.3)
WBC # BLD AUTO: 17.8 X10*3/UL (ref 4.4–11.3)
WBC # BLD AUTO: 21 X10*3/UL (ref 4.4–11.3)
WBC # BLD AUTO: 21.2 X10*3/UL (ref 4.4–11.3)
WBC # BLD AUTO: 22.9 X10*3/UL (ref 4.4–11.3)
WBC # BLD AUTO: 7.8 X10*3/UL (ref 4.4–11.3)
WBC # BLD AUTO: 7.8 X10*3/UL (ref 4.4–11.3)
WBC # BLD AUTO: 8.8 X10*3/UL (ref 4.4–11.3)
WBC # BLD AUTO: 9.2 X10*3/UL (ref 4.4–11.3)
WBC # BLD AUTO: 9.4 X10*3/UL (ref 4.4–11.3)
WBC # BLD AUTO: NORMAL 10*3/UL
WBC # FLD AUTO: 67 /UL
WBC #/AREA URNS AUTO: >50 /HPF
WBC CLUMPS #/AREA URNS AUTO: ABNORMAL /HPF
XM INTEP: NORMAL
XM INTEP: NORMAL

## 2024-01-01 PROCEDURE — 2500000001 HC RX 250 WO HCPCS SELF ADMINISTERED DRUGS (ALT 637 FOR MEDICARE OP)

## 2024-01-01 PROCEDURE — 2500000002 HC RX 250 W HCPCS SELF ADMINISTERED DRUGS (ALT 637 FOR MEDICARE OP, ALT 636 FOR OP/ED): Performed by: ANESTHESIOLOGY

## 2024-01-01 PROCEDURE — 99291 CRITICAL CARE FIRST HOUR: CPT

## 2024-01-01 PROCEDURE — 2500000002 HC RX 250 W HCPCS SELF ADMINISTERED DRUGS (ALT 637 FOR MEDICARE OP, ALT 636 FOR OP/ED)

## 2024-01-01 PROCEDURE — 99306 1ST NF CARE HIGH MDM 50: CPT | Performed by: INTERNAL MEDICINE

## 2024-01-01 PROCEDURE — 2020000001 HC ICU ROOM DAILY

## 2024-01-01 PROCEDURE — 8010000001 HC DIALYSIS - HEMODIALYSIS PER DAY

## 2024-01-01 PROCEDURE — 37799 UNLISTED PX VASCULAR SURGERY: CPT | Performed by: EMERGENCY MEDICINE

## 2024-01-01 PROCEDURE — 2500000004 HC RX 250 GENERAL PHARMACY W/ HCPCS (ALT 636 FOR OP/ED): Performed by: INTERNAL MEDICINE

## 2024-01-01 PROCEDURE — 82947 ASSAY GLUCOSE BLOOD QUANT: CPT

## 2024-01-01 PROCEDURE — 85027 COMPLETE CBC AUTOMATED: CPT

## 2024-01-01 PROCEDURE — 2500000001 HC RX 250 WO HCPCS SELF ADMINISTERED DRUGS (ALT 637 FOR MEDICARE OP): Performed by: INTERNAL MEDICINE

## 2024-01-01 PROCEDURE — 80202 ASSAY OF VANCOMYCIN: CPT | Performed by: PHARMACIST

## 2024-01-01 PROCEDURE — 94640 AIRWAY INHALATION TREATMENT: CPT

## 2024-01-01 PROCEDURE — C9113 INJ PANTOPRAZOLE SODIUM, VIA: HCPCS

## 2024-01-01 PROCEDURE — 83735 ASSAY OF MAGNESIUM: CPT

## 2024-01-01 PROCEDURE — 2500000001 HC RX 250 WO HCPCS SELF ADMINISTERED DRUGS (ALT 637 FOR MEDICARE OP): Performed by: ANESTHESIOLOGY

## 2024-01-01 PROCEDURE — 2500000005 HC RX 250 GENERAL PHARMACY W/O HCPCS: Performed by: INTERNAL MEDICINE

## 2024-01-01 PROCEDURE — 87186 SC STD MICRODIL/AGAR DIL: CPT | Mod: WESLAB

## 2024-01-01 PROCEDURE — 97530 THERAPEUTIC ACTIVITIES: CPT | Mod: GO,CO

## 2024-01-01 PROCEDURE — 2500000002 HC RX 250 W HCPCS SELF ADMINISTERED DRUGS (ALT 637 FOR MEDICARE OP, ALT 636 FOR OP/ED): Performed by: NURSE PRACTITIONER

## 2024-01-01 PROCEDURE — 81001 URINALYSIS AUTO W/SCOPE: CPT | Performed by: INTERNAL MEDICINE

## 2024-01-01 PROCEDURE — 2500000002 HC RX 250 W HCPCS SELF ADMINISTERED DRUGS (ALT 637 FOR MEDICARE OP, ALT 636 FOR OP/ED): Performed by: STUDENT IN AN ORGANIZED HEALTH CARE EDUCATION/TRAINING PROGRAM

## 2024-01-01 PROCEDURE — 90937 HEMODIALYSIS REPEATED EVAL: CPT

## 2024-01-01 PROCEDURE — 37799 UNLISTED PX VASCULAR SURGERY: CPT

## 2024-01-01 PROCEDURE — 2500000004 HC RX 250 GENERAL PHARMACY W/ HCPCS (ALT 636 FOR OP/ED): Performed by: PHARMACIST

## 2024-01-01 PROCEDURE — 2500000001 HC RX 250 WO HCPCS SELF ADMINISTERED DRUGS (ALT 637 FOR MEDICARE OP): Performed by: NURSE PRACTITIONER

## 2024-01-01 PROCEDURE — 84100 ASSAY OF PHOSPHORUS: CPT

## 2024-01-01 PROCEDURE — 6350000001 HC RX 635 EPOETIN >10,000 UNITS: Mod: JZ | Performed by: INTERNAL MEDICINE

## 2024-01-01 PROCEDURE — 84443 ASSAY THYROID STIM HORMONE: CPT | Performed by: INTERNAL MEDICINE

## 2024-01-01 PROCEDURE — 2500000004 HC RX 250 GENERAL PHARMACY W/ HCPCS (ALT 636 FOR OP/ED)

## 2024-01-01 PROCEDURE — 2500000005 HC RX 250 GENERAL PHARMACY W/O HCPCS

## 2024-01-01 PROCEDURE — 92526 ORAL FUNCTION THERAPY: CPT | Mod: GN | Performed by: SPEECH-LANGUAGE PATHOLOGIST

## 2024-01-01 PROCEDURE — 2500000004 HC RX 250 GENERAL PHARMACY W/ HCPCS (ALT 636 FOR OP/ED): Mod: JZ | Performed by: INTERNAL MEDICINE

## 2024-01-01 PROCEDURE — 2500000004 HC RX 250 GENERAL PHARMACY W/ HCPCS (ALT 636 FOR OP/ED): Performed by: NURSE PRACTITIONER

## 2024-01-01 PROCEDURE — 85027 COMPLETE CBC AUTOMATED: CPT | Performed by: EMERGENCY MEDICINE

## 2024-01-01 PROCEDURE — 99233 SBSQ HOSP IP/OBS HIGH 50: CPT | Performed by: NURSE PRACTITIONER

## 2024-01-01 PROCEDURE — 73610 X-RAY EXAM OF ANKLE: CPT | Mod: RT

## 2024-01-01 PROCEDURE — 99291 CRITICAL CARE FIRST HOUR: CPT | Performed by: INTERNAL MEDICINE

## 2024-01-01 PROCEDURE — 99497 ADVNCD CARE PLAN 30 MIN: CPT | Performed by: NURSE PRACTITIONER

## 2024-01-01 PROCEDURE — 37799 UNLISTED PX VASCULAR SURGERY: CPT | Performed by: STUDENT IN AN ORGANIZED HEALTH CARE EDUCATION/TRAINING PROGRAM

## 2024-01-01 PROCEDURE — 83735 ASSAY OF MAGNESIUM: CPT | Performed by: INTERNAL MEDICINE

## 2024-01-01 PROCEDURE — 99291 CRITICAL CARE FIRST HOUR: CPT | Performed by: SURGERY

## 2024-01-01 PROCEDURE — 9420000001 HC RT PATIENT EDUCATION 5 MIN

## 2024-01-01 PROCEDURE — 94664 DEMO&/EVAL PT USE INHALER: CPT

## 2024-01-01 PROCEDURE — 84478 ASSAY OF TRIGLYCERIDES: CPT | Mod: WESLAB

## 2024-01-01 PROCEDURE — 84443 ASSAY THYROID STIM HORMONE: CPT

## 2024-01-01 PROCEDURE — 84100 ASSAY OF PHOSPHORUS: CPT | Performed by: ANESTHESIOLOGY

## 2024-01-01 PROCEDURE — 37799 UNLISTED PX VASCULAR SURGERY: CPT | Performed by: INTERNAL MEDICINE

## 2024-01-01 PROCEDURE — 94668 MNPJ CHEST WALL SBSQ: CPT

## 2024-01-01 PROCEDURE — 80053 COMPREHEN METABOLIC PANEL: CPT

## 2024-01-01 PROCEDURE — 99497 ADVNCD CARE PLAN 30 MIN: CPT

## 2024-01-01 PROCEDURE — 99233 SBSQ HOSP IP/OBS HIGH 50: CPT | Performed by: INTERNAL MEDICINE

## 2024-01-01 PROCEDURE — 2500000004 HC RX 250 GENERAL PHARMACY W/ HCPCS (ALT 636 FOR OP/ED): Performed by: SURGERY

## 2024-01-01 PROCEDURE — 93010 ELECTROCARDIOGRAM REPORT: CPT | Performed by: INTERNAL MEDICINE

## 2024-01-01 PROCEDURE — 83605 ASSAY OF LACTIC ACID: CPT | Performed by: STUDENT IN AN ORGANIZED HEALTH CARE EDUCATION/TRAINING PROGRAM

## 2024-01-01 PROCEDURE — 2500000004 HC RX 250 GENERAL PHARMACY W/ HCPCS (ALT 636 FOR OP/ED): Mod: JW | Performed by: INTERNAL MEDICINE

## 2024-01-01 PROCEDURE — 99232 SBSQ HOSP IP/OBS MODERATE 35: CPT | Performed by: SURGERY

## 2024-01-01 PROCEDURE — 36556 INSERT NON-TUNNEL CV CATH: CPT

## 2024-01-01 PROCEDURE — 6350000001 HC RX 635 EPOETIN >10,000 UNITS: Mod: JZ | Performed by: STUDENT IN AN ORGANIZED HEALTH CARE EDUCATION/TRAINING PROGRAM

## 2024-01-01 PROCEDURE — 85025 COMPLETE CBC W/AUTO DIFF WBC: CPT

## 2024-01-01 PROCEDURE — 85027 COMPLETE CBC AUTOMATED: CPT | Performed by: STUDENT IN AN ORGANIZED HEALTH CARE EDUCATION/TRAINING PROGRAM

## 2024-01-01 PROCEDURE — 89051 BODY FLUID CELL COUNT: CPT

## 2024-01-01 PROCEDURE — 84132 ASSAY OF SERUM POTASSIUM: CPT | Performed by: INTERNAL MEDICINE

## 2024-01-01 PROCEDURE — 99291 CRITICAL CARE FIRST HOUR: CPT | Performed by: STUDENT IN AN ORGANIZED HEALTH CARE EDUCATION/TRAINING PROGRAM

## 2024-01-01 PROCEDURE — 84100 ASSAY OF PHOSPHORUS: CPT | Performed by: EMERGENCY MEDICINE

## 2024-01-01 PROCEDURE — 36415 COLL VENOUS BLD VENIPUNCTURE: CPT | Performed by: EMERGENCY MEDICINE

## 2024-01-01 PROCEDURE — 82248 BILIRUBIN DIRECT: CPT

## 2024-01-01 PROCEDURE — 36620 INSERTION CATHETER ARTERY: CPT

## 2024-01-01 PROCEDURE — 97110 THERAPEUTIC EXERCISES: CPT | Mod: GO,CO

## 2024-01-01 PROCEDURE — 80048 BASIC METABOLIC PNL TOTAL CA: CPT | Performed by: STUDENT IN AN ORGANIZED HEALTH CARE EDUCATION/TRAINING PROGRAM

## 2024-01-01 PROCEDURE — 92950 HEART/LUNG RESUSCITATION CPR: CPT | Performed by: EMERGENCY MEDICINE

## 2024-01-01 PROCEDURE — 71045 X-RAY EXAM CHEST 1 VIEW: CPT

## 2024-01-01 PROCEDURE — 85027 COMPLETE CBC AUTOMATED: CPT | Performed by: INTERNAL MEDICINE

## 2024-01-01 PROCEDURE — 84450 TRANSFERASE (AST) (SGOT): CPT

## 2024-01-01 PROCEDURE — 80048 BASIC METABOLIC PNL TOTAL CA: CPT

## 2024-01-01 PROCEDURE — P9047 ALBUMIN (HUMAN), 25%, 50ML: HCPCS | Mod: JZ | Performed by: INTERNAL MEDICINE

## 2024-01-01 PROCEDURE — 2500000004 HC RX 250 GENERAL PHARMACY W/ HCPCS (ALT 636 FOR OP/ED): Performed by: EMERGENCY MEDICINE

## 2024-01-01 PROCEDURE — 05HY33Z INSERTION OF INFUSION DEVICE INTO UPPER VEIN, PERCUTANEOUS APPROACH: ICD-10-PCS | Performed by: ANESTHESIOLOGY

## 2024-01-01 PROCEDURE — 93970 EXTREMITY STUDY: CPT | Performed by: INTERNAL MEDICINE

## 2024-01-01 PROCEDURE — 85610 PROTHROMBIN TIME: CPT | Performed by: NURSE PRACTITIONER

## 2024-01-01 PROCEDURE — 80053 COMPREHEN METABOLIC PANEL: CPT | Performed by: INTERNAL MEDICINE

## 2024-01-01 PROCEDURE — 2500000004 HC RX 250 GENERAL PHARMACY W/ HCPCS (ALT 636 FOR OP/ED): Performed by: STUDENT IN AN ORGANIZED HEALTH CARE EDUCATION/TRAINING PROGRAM

## 2024-01-01 PROCEDURE — 37799 UNLISTED PX VASCULAR SURGERY: CPT | Performed by: PHARMACIST

## 2024-01-01 PROCEDURE — P9047 ALBUMIN (HUMAN), 25%, 50ML: HCPCS | Mod: JW | Performed by: INTERNAL MEDICINE

## 2024-01-01 PROCEDURE — 80202 ASSAY OF VANCOMYCIN: CPT | Performed by: INTERNAL MEDICINE

## 2024-01-01 PROCEDURE — 93325 DOPPLER ECHO COLOR FLOW MAPG: CPT

## 2024-01-01 PROCEDURE — 2500000001 HC RX 250 WO HCPCS SELF ADMINISTERED DRUGS (ALT 637 FOR MEDICARE OP): Performed by: STUDENT IN AN ORGANIZED HEALTH CARE EDUCATION/TRAINING PROGRAM

## 2024-01-01 PROCEDURE — 84132 ASSAY OF SERUM POTASSIUM: CPT

## 2024-01-01 PROCEDURE — 92610 EVALUATE SWALLOWING FUNCTION: CPT | Mod: GN | Performed by: SPEECH-LANGUAGE PATHOLOGIST

## 2024-01-01 PROCEDURE — 80069 RENAL FUNCTION PANEL: CPT | Performed by: INTERNAL MEDICINE

## 2024-01-01 PROCEDURE — 2060000001 HC INTERMEDIATE ICU ROOM DAILY

## 2024-01-01 PROCEDURE — 74176 CT ABD & PELVIS W/O CONTRAST: CPT

## 2024-01-01 PROCEDURE — 2500000002 HC RX 250 W HCPCS SELF ADMINISTERED DRUGS (ALT 637 FOR MEDICARE OP, ALT 636 FOR OP/ED): Performed by: INTERNAL MEDICINE

## 2024-01-01 PROCEDURE — 2500000001 HC RX 250 WO HCPCS SELF ADMINISTERED DRUGS (ALT 637 FOR MEDICARE OP): Performed by: EMERGENCY MEDICINE

## 2024-01-01 PROCEDURE — 86901 BLOOD TYPING SEROLOGIC RH(D): CPT | Performed by: NURSE PRACTITIONER

## 2024-01-01 PROCEDURE — 83986 ASSAY PH BODY FLUID NOS: CPT

## 2024-01-01 PROCEDURE — 83735 ASSAY OF MAGNESIUM: CPT | Performed by: EMERGENCY MEDICINE

## 2024-01-01 PROCEDURE — 84295 ASSAY OF SERUM SODIUM: CPT

## 2024-01-01 PROCEDURE — 97535 SELF CARE MNGMENT TRAINING: CPT | Mod: GO

## 2024-01-01 PROCEDURE — 88305 TISSUE EXAM BY PATHOLOGIST: CPT | Performed by: PATHOLOGY

## 2024-01-01 PROCEDURE — 99222 1ST HOSP IP/OBS MODERATE 55: CPT | Performed by: SURGERY

## 2024-01-01 PROCEDURE — 84132 ASSAY OF SERUM POTASSIUM: CPT | Mod: 91

## 2024-01-01 PROCEDURE — 85025 COMPLETE CBC W/AUTO DIFF WBC: CPT | Performed by: INTERNAL MEDICINE

## 2024-01-01 PROCEDURE — 99231 SBSQ HOSP IP/OBS SF/LOW 25: CPT | Performed by: INTERNAL MEDICINE

## 2024-01-01 PROCEDURE — 80048 BASIC METABOLIC PNL TOTAL CA: CPT | Performed by: INTERNAL MEDICINE

## 2024-01-01 PROCEDURE — 97112 NEUROMUSCULAR REEDUCATION: CPT | Mod: GP

## 2024-01-01 PROCEDURE — 37799 UNLISTED PX VASCULAR SURGERY: CPT | Performed by: NURSE PRACTITIONER

## 2024-01-01 PROCEDURE — 99285 EMERGENCY DEPT VISIT HI MDM: CPT | Performed by: EMERGENCY MEDICINE

## 2024-01-01 PROCEDURE — 86704 HEP B CORE ANTIBODY TOTAL: CPT | Mod: WESLAB

## 2024-01-01 PROCEDURE — 84134 ASSAY OF PREALBUMIN: CPT | Mod: WESLAB | Performed by: NURSE PRACTITIONER

## 2024-01-01 PROCEDURE — 97110 THERAPEUTIC EXERCISES: CPT | Mod: GP

## 2024-01-01 PROCEDURE — 99498 ADVNCD CARE PLAN ADDL 30 MIN: CPT | Performed by: NURSE PRACTITIONER

## 2024-01-01 PROCEDURE — 73620 X-RAY EXAM OF FOOT: CPT | Mod: LT

## 2024-01-01 PROCEDURE — 99232 SBSQ HOSP IP/OBS MODERATE 35: CPT | Performed by: INTERNAL MEDICINE

## 2024-01-01 PROCEDURE — 84145 PROCALCITONIN (PCT): CPT | Mod: WESLAB

## 2024-01-01 PROCEDURE — 97530 THERAPEUTIC ACTIVITIES: CPT | Mod: GP

## 2024-01-01 PROCEDURE — 2500000001 HC RX 250 WO HCPCS SELF ADMINISTERED DRUGS (ALT 637 FOR MEDICARE OP): Performed by: SURGERY

## 2024-01-01 PROCEDURE — 82374 ASSAY BLOOD CARBON DIOXIDE: CPT

## 2024-01-01 PROCEDURE — 82784 ASSAY IGA/IGD/IGG/IGM EACH: CPT | Mod: WESLAB | Performed by: INTERNAL MEDICINE

## 2024-01-01 PROCEDURE — 86920 COMPATIBILITY TEST SPIN: CPT

## 2024-01-01 PROCEDURE — 99233 SBSQ HOSP IP/OBS HIGH 50: CPT

## 2024-01-01 PROCEDURE — 94667 MNPJ CHEST WALL 1ST: CPT

## 2024-01-01 PROCEDURE — 99285 EMERGENCY DEPT VISIT HI MDM: CPT | Mod: 25

## 2024-01-01 PROCEDURE — 87081 CULTURE SCREEN ONLY: CPT | Mod: WESLAB

## 2024-01-01 PROCEDURE — 97166 OT EVAL MOD COMPLEX 45 MIN: CPT | Mod: GO

## 2024-01-01 PROCEDURE — 86334 IMMUNOFIX E-PHORESIS SERUM: CPT | Mod: WESLAB | Performed by: INTERNAL MEDICINE

## 2024-01-01 PROCEDURE — 84155 ASSAY OF PROTEIN SERUM: CPT | Mod: WESLAB | Performed by: INTERNAL MEDICINE

## 2024-01-01 PROCEDURE — 87040 BLOOD CULTURE FOR BACTERIA: CPT | Mod: WESLAB | Performed by: EMERGENCY MEDICINE

## 2024-01-01 PROCEDURE — 86707 HEPATITIS BE ANTIBODY: CPT

## 2024-01-01 PROCEDURE — 86706 HEP B SURFACE ANTIBODY: CPT | Mod: WESLAB

## 2024-01-01 PROCEDURE — 83540 ASSAY OF IRON: CPT | Performed by: INTERNAL MEDICINE

## 2024-01-01 PROCEDURE — 87340 HEPATITIS B SURFACE AG IA: CPT | Mod: WESLAB

## 2024-01-01 PROCEDURE — 84075 ASSAY ALKALINE PHOSPHATASE: CPT

## 2024-01-01 PROCEDURE — 86157 COLD AGGLUTININ TITER: CPT | Mod: WESLAB | Performed by: INTERNAL MEDICINE

## 2024-01-01 PROCEDURE — 84155 ASSAY OF PROTEIN SERUM: CPT

## 2024-01-01 PROCEDURE — 82374 ASSAY BLOOD CARBON DIOXIDE: CPT | Performed by: STUDENT IN AN ORGANIZED HEALTH CARE EDUCATION/TRAINING PROGRAM

## 2024-01-01 PROCEDURE — 73590 X-RAY EXAM OF LOWER LEG: CPT | Mod: RT

## 2024-01-01 PROCEDURE — 85025 COMPLETE CBC W/AUTO DIFF WBC: CPT | Performed by: EMERGENCY MEDICINE

## 2024-01-01 PROCEDURE — 99223 1ST HOSP IP/OBS HIGH 75: CPT | Performed by: NURSE PRACTITIONER

## 2024-01-01 PROCEDURE — 0W9B3ZZ DRAINAGE OF LEFT PLEURAL CAVITY, PERCUTANEOUS APPROACH: ICD-10-PCS | Performed by: ANESTHESIOLOGY

## 2024-01-01 PROCEDURE — 70450 CT HEAD/BRAIN W/O DYE: CPT

## 2024-01-01 PROCEDURE — 93325 DOPPLER ECHO COLOR FLOW MAPG: CPT | Performed by: INTERNAL MEDICINE

## 2024-01-01 PROCEDURE — 84484 ASSAY OF TROPONIN QUANT: CPT | Performed by: EMERGENCY MEDICINE

## 2024-01-01 PROCEDURE — 2780000003 HC OR 278 NO HCPCS

## 2024-01-01 PROCEDURE — 99232 SBSQ HOSP IP/OBS MODERATE 35: CPT

## 2024-01-01 PROCEDURE — 99231 SBSQ HOSP IP/OBS SF/LOW 25: CPT | Performed by: NURSE PRACTITIONER

## 2024-01-01 PROCEDURE — 83615 LACTATE (LD) (LDH) ENZYME: CPT | Mod: WESLAB

## 2024-01-01 PROCEDURE — 84165 PROTEIN E-PHORESIS SERUM: CPT | Performed by: INTERNAL MEDICINE

## 2024-01-01 PROCEDURE — 83605 ASSAY OF LACTIC ACID: CPT | Performed by: INTERNAL MEDICINE

## 2024-01-01 PROCEDURE — 93321 DOPPLER ECHO F-UP/LMTD STD: CPT | Performed by: INTERNAL MEDICINE

## 2024-01-01 PROCEDURE — 2500000004 HC RX 250 GENERAL PHARMACY W/ HCPCS (ALT 636 FOR OP/ED): Mod: JG

## 2024-01-01 PROCEDURE — 32556 INSERT CATH PLEURA W/O IMAGE: CPT

## 2024-01-01 PROCEDURE — 99292 CRITICAL CARE ADDL 30 MIN: CPT | Performed by: SURGERY

## 2024-01-01 PROCEDURE — 84157 ASSAY OF PROTEIN OTHER: CPT | Mod: WESLAB

## 2024-01-01 PROCEDURE — 87086 URINE CULTURE/COLONY COUNT: CPT | Mod: WESLAB | Performed by: INTERNAL MEDICINE

## 2024-01-01 PROCEDURE — 6350000001 HC RX 635 EPOETIN >10,000 UNITS: Mod: JZ | Performed by: NURSE PRACTITIONER

## 2024-01-01 PROCEDURE — 93306 TTE W/DOPPLER COMPLETE: CPT | Performed by: INTERNAL MEDICINE

## 2024-01-01 PROCEDURE — 97535 SELF CARE MNGMENT TRAINING: CPT | Mod: GO,CO

## 2024-01-01 PROCEDURE — P9045 ALBUMIN (HUMAN), 5%, 250 ML: HCPCS | Mod: JZ

## 2024-01-01 PROCEDURE — 86705 HEP B CORE ANTIBODY IGM: CPT | Mod: WESLAB

## 2024-01-01 PROCEDURE — 93308 TTE F-UP OR LMTD: CPT | Performed by: INTERNAL MEDICINE

## 2024-01-01 PROCEDURE — 3E033XZ INTRODUCTION OF VASOPRESSOR INTO PERIPHERAL VEIN, PERCUTANEOUS APPROACH: ICD-10-PCS | Performed by: INTERNAL MEDICINE

## 2024-01-01 PROCEDURE — 85652 RBC SED RATE AUTOMATED: CPT

## 2024-01-01 PROCEDURE — 82947 ASSAY GLUCOSE BLOOD QUANT: CPT | Mod: 91

## 2024-01-01 PROCEDURE — 82533 TOTAL CORTISOL: CPT | Mod: WESLAB

## 2024-01-01 PROCEDURE — 99221 1ST HOSP IP/OBS SF/LOW 40: CPT | Performed by: INTERNAL MEDICINE

## 2024-01-01 PROCEDURE — 93306 TTE W/DOPPLER COMPLETE: CPT

## 2024-01-01 PROCEDURE — 36569 INSJ PICC 5 YR+ W/O IMAGING: CPT

## 2024-01-01 PROCEDURE — 97162 PT EVAL MOD COMPLEX 30 MIN: CPT | Mod: GP

## 2024-01-01 PROCEDURE — 99309 SBSQ NF CARE MODERATE MDM 30: CPT | Performed by: INTERNAL MEDICINE

## 2024-01-01 PROCEDURE — 88112 CYTOPATH CELL ENHANCE TECH: CPT | Performed by: PATHOLOGY

## 2024-01-01 PROCEDURE — 83605 ASSAY OF LACTIC ACID: CPT | Performed by: EMERGENCY MEDICINE

## 2024-01-01 PROCEDURE — 93005 ELECTROCARDIOGRAM TRACING: CPT

## 2024-01-01 PROCEDURE — 88112 CYTOPATH CELL ENHANCE TECH: CPT | Mod: TC

## 2024-01-01 PROCEDURE — 86320 SERUM IMMUNOELECTROPHORESIS: CPT | Performed by: INTERNAL MEDICINE

## 2024-01-01 PROCEDURE — 02HV33Z INSERTION OF INFUSION DEVICE INTO SUPERIOR VENA CAVA, PERCUTANEOUS APPROACH: ICD-10-PCS | Performed by: ANESTHESIOLOGY

## 2024-01-01 PROCEDURE — 96365 THER/PROPH/DIAG IV INF INIT: CPT

## 2024-01-01 PROCEDURE — 82150 ASSAY OF AMYLASE: CPT | Mod: WESLAB

## 2024-01-01 PROCEDURE — 82247 BILIRUBIN TOTAL: CPT | Performed by: EMERGENCY MEDICINE

## 2024-01-01 PROCEDURE — 73630 X-RAY EXAM OF FOOT: CPT | Mod: LT

## 2024-01-01 PROCEDURE — 99308 SBSQ NF CARE LOW MDM 20: CPT | Performed by: INTERNAL MEDICINE

## 2024-01-01 PROCEDURE — C1751 CATH, INF, PER/CENT/MIDLINE: HCPCS

## 2024-01-01 PROCEDURE — 80069 RENAL FUNCTION PANEL: CPT | Performed by: STUDENT IN AN ORGANIZED HEALTH CARE EDUCATION/TRAINING PROGRAM

## 2024-01-01 PROCEDURE — 87636 SARSCOV2 & INF A&B AMP PRB: CPT | Performed by: EMERGENCY MEDICINE

## 2024-01-01 PROCEDURE — 2500000004 HC RX 250 GENERAL PHARMACY W/ HCPCS (ALT 636 FOR OP/ED): Mod: JZ

## 2024-01-01 PROCEDURE — 87641 MR-STAPH DNA AMP PROBE: CPT | Performed by: PHARMACIST

## 2024-01-01 PROCEDURE — 84134 ASSAY OF PREALBUMIN: CPT | Mod: WESLAB | Performed by: INTERNAL MEDICINE

## 2024-01-01 PROCEDURE — 80053 COMPREHEN METABOLIC PANEL: CPT | Performed by: EMERGENCY MEDICINE

## 2024-01-01 PROCEDURE — 85007 BL SMEAR W/DIFF WBC COUNT: CPT | Performed by: INTERNAL MEDICINE

## 2024-01-01 PROCEDURE — 93970 EXTREMITY STUDY: CPT

## 2024-01-01 PROCEDURE — 82945 GLUCOSE OTHER FLUID: CPT | Mod: WESLAB

## 2024-01-01 PROCEDURE — 87186 SC STD MICRODIL/AGAR DIL: CPT | Mod: WESLAB | Performed by: INTERNAL MEDICINE

## 2024-01-01 PROCEDURE — 85025 COMPLETE CBC W/AUTO DIFF WBC: CPT | Performed by: STUDENT IN AN ORGANIZED HEALTH CARE EDUCATION/TRAINING PROGRAM

## 2024-01-01 PROCEDURE — 87493 C DIFF AMPLIFIED PROBE: CPT | Performed by: INTERNAL MEDICINE

## 2024-01-01 PROCEDURE — 83615 LACTATE (LD) (LDH) ENZYME: CPT

## 2024-01-01 PROCEDURE — 5A1D70Z PERFORMANCE OF URINARY FILTRATION, INTERMITTENT, LESS THAN 6 HOURS PER DAY: ICD-10-PCS | Performed by: INTERNAL MEDICINE

## 2024-01-01 RX ORDER — VANCOMYCIN 1 G/200ML
1 INJECTION, SOLUTION INTRAVENOUS ONCE
Status: COMPLETED | OUTPATIENT
Start: 2024-01-01 | End: 2024-01-01

## 2024-01-01 RX ORDER — HEPARIN SODIUM 1000 [USP'U]/ML
2000 INJECTION, SOLUTION INTRAVENOUS; SUBCUTANEOUS
Status: DISCONTINUED | OUTPATIENT
Start: 2024-01-01 | End: 2024-01-01

## 2024-01-01 RX ORDER — HEPARIN SODIUM 1000 [USP'U]/ML
2000 INJECTION, SOLUTION INTRAVENOUS; SUBCUTANEOUS
Status: COMPLETED | OUTPATIENT
Start: 2024-01-01 | End: 2024-01-01

## 2024-01-01 RX ORDER — MIDODRINE HYDROCHLORIDE 10 MG/1
20 TABLET ORAL
Status: DISCONTINUED | OUTPATIENT
Start: 2024-01-01 | End: 2024-01-01

## 2024-01-01 RX ORDER — VANCOMYCIN HYDROCHLORIDE 125 MG/1
125 CAPSULE ORAL 2 TIMES DAILY
Start: 2024-01-01 | End: 2024-05-28

## 2024-01-01 RX ORDER — VANCOMYCIN HYDROCHLORIDE 250 MG/1
500 CAPSULE ORAL 4 TIMES DAILY
Qty: 40 CAPSULE | Refills: 0 | Status: SHIPPED | OUTPATIENT
Start: 2024-01-01 | End: 2024-01-01

## 2024-01-01 RX ORDER — FEBUXOSTAT 40 MG/1
40 TABLET, FILM COATED ORAL EVERY OTHER DAY
Status: DISCONTINUED | OUTPATIENT
Start: 2024-01-01 | End: 2024-01-01

## 2024-01-01 RX ORDER — HEPARIN SODIUM 1000 [USP'U]/ML
2000 INJECTION, SOLUTION INTRAVENOUS; SUBCUTANEOUS
Status: DISCONTINUED | OUTPATIENT
Start: 2024-01-01 | End: 2024-01-01 | Stop reason: HOSPADM

## 2024-01-01 RX ORDER — INSULIN LISPRO 100 [IU]/ML
0-10 INJECTION, SOLUTION INTRAVENOUS; SUBCUTANEOUS
Start: 2024-01-01 | End: 2024-05-28

## 2024-01-01 RX ORDER — DOXYCYCLINE 100 MG/1
100 CAPSULE ORAL DAILY
Status: DISCONTINUED | OUTPATIENT
Start: 2024-01-01 | End: 2024-01-01 | Stop reason: HOSPADM

## 2024-01-01 RX ORDER — VANCOMYCIN HYDROCHLORIDE 1 G/20ML
INJECTION, POWDER, LYOPHILIZED, FOR SOLUTION INTRAVENOUS DAILY PRN
Status: DISCONTINUED | OUTPATIENT
Start: 2024-01-01 | End: 2024-01-01

## 2024-01-01 RX ORDER — ACETAMINOPHEN 325 MG/1
650 TABLET ORAL EVERY 4 HOURS PRN
Qty: 30 TABLET | Refills: 0
Start: 2024-01-01 | End: 2024-05-28

## 2024-01-01 RX ORDER — MIDODRINE HYDROCHLORIDE 5 MG/1
15 TABLET ORAL
Start: 2024-01-01 | End: 2024-01-01 | Stop reason: HOSPADM

## 2024-01-01 RX ORDER — INSULIN LISPRO 100 [IU]/ML
0-10 INJECTION, SOLUTION INTRAVENOUS; SUBCUTANEOUS
Status: DISCONTINUED | OUTPATIENT
Start: 2024-01-01 | End: 2024-01-01 | Stop reason: HOSPADM

## 2024-01-01 RX ORDER — SODIUM,POTASSIUM PHOSPHATES 280-250MG
1 POWDER IN PACKET (EA) ORAL
Status: COMPLETED | OUTPATIENT
Start: 2024-01-01 | End: 2024-01-01

## 2024-01-01 RX ORDER — HEPARIN 100 UNIT/ML
1.6 SYRINGE INTRAVENOUS AS NEEDED
Start: 2024-01-01 | End: 2024-05-28

## 2024-01-01 RX ORDER — VANCOMYCIN HYDROCHLORIDE 125 MG/1
125 CAPSULE ORAL DAILY
Status: DISCONTINUED | OUTPATIENT
Start: 2024-01-01 | End: 2024-01-01

## 2024-01-01 RX ORDER — ZINC OXIDE 20 G/100G
1 OINTMENT TOPICAL
Status: CANCELLED
Start: 2024-01-01

## 2024-01-01 RX ORDER — ACETAMINOPHEN 325 MG/1
650 TABLET ORAL EVERY 4 HOURS PRN
Status: DISCONTINUED | OUTPATIENT
Start: 2024-01-01 | End: 2024-01-01 | Stop reason: HOSPADM

## 2024-01-01 RX ORDER — OXYCODONE AND ACETAMINOPHEN 5; 325 MG/1; MG/1
1 TABLET ORAL EVERY 12 HOURS PRN
Qty: 120 TABLET | Refills: 0 | Status: SHIPPED | OUTPATIENT
Start: 2024-01-01 | End: 2024-01-01 | Stop reason: HOSPADM

## 2024-01-01 RX ORDER — NOREPINEPHRINE BITARTRATE/D5W 8 MG/250ML
.01-3 PLASTIC BAG, INJECTION (ML) INTRAVENOUS CONTINUOUS
Status: DISCONTINUED | OUTPATIENT
Start: 2024-01-01 | End: 2024-01-01

## 2024-01-01 RX ORDER — NOREPINEPHRINE BITARTRATE/D5W 8 MG/250ML
.01-3 PLASTIC BAG, INJECTION (ML) INTRAVENOUS CONTINUOUS
Start: 2024-01-01 | End: 2024-05-28

## 2024-01-01 RX ORDER — ALBUMIN HUMAN 50 G/1000ML
12.5 SOLUTION INTRAVENOUS ONCE
Status: COMPLETED | OUTPATIENT
Start: 2024-01-01 | End: 2024-01-01

## 2024-01-01 RX ORDER — ASPIRIN 325 MG
325 TABLET ORAL ONCE
Status: DISCONTINUED | OUTPATIENT
Start: 2024-01-01 | End: 2024-01-01

## 2024-01-01 RX ORDER — INSULIN GLARGINE 100 [IU]/ML
6 INJECTION, SOLUTION SUBCUTANEOUS EVERY 24 HOURS
Status: CANCELLED
Start: 2024-01-01

## 2024-01-01 RX ORDER — PANTOPRAZOLE SODIUM 40 MG/10ML
40 INJECTION, POWDER, LYOPHILIZED, FOR SOLUTION INTRAVENOUS DAILY
Status: DISCONTINUED | OUTPATIENT
Start: 2024-01-01 | End: 2024-01-01 | Stop reason: HOSPADM

## 2024-01-01 RX ORDER — ALBUMIN HUMAN 250 G/1000ML
25 SOLUTION INTRAVENOUS
Status: COMPLETED | OUTPATIENT
Start: 2024-01-01 | End: 2024-01-01

## 2024-01-01 RX ORDER — LIDOCAINE HYDROCHLORIDE 10 MG/ML
5 INJECTION INFILTRATION; PERINEURAL ONCE
Status: DISCONTINUED | OUTPATIENT
Start: 2024-01-01 | End: 2024-01-01

## 2024-01-01 RX ORDER — OXYCODONE AND ACETAMINOPHEN 5; 325 MG/1; MG/1
1 TABLET ORAL EVERY 8 HOURS PRN
Status: DISCONTINUED | OUTPATIENT
Start: 2024-01-01 | End: 2024-01-01

## 2024-01-01 RX ORDER — PANTOPRAZOLE SODIUM 40 MG/1
40 TABLET, DELAYED RELEASE ORAL DAILY
Status: DISCONTINUED | OUTPATIENT
Start: 2024-01-01 | End: 2024-01-01 | Stop reason: HOSPADM

## 2024-01-01 RX ORDER — NYSTATIN 100000 [USP'U]/G
POWDER TOPICAL 2 TIMES DAILY
Status: DISCONTINUED | OUTPATIENT
Start: 2024-01-01 | End: 2024-01-01 | Stop reason: HOSPADM

## 2024-01-01 RX ORDER — ESCITALOPRAM OXALATE 10 MG/1
10 TABLET ORAL NIGHTLY
Status: DISCONTINUED | OUTPATIENT
Start: 2024-01-01 | End: 2024-01-01 | Stop reason: HOSPADM

## 2024-01-01 RX ORDER — ALBUMIN HUMAN 250 G/1000ML
12.5 SOLUTION INTRAVENOUS
Status: COMPLETED | OUTPATIENT
Start: 2024-01-01 | End: 2024-01-01

## 2024-01-01 RX ORDER — IPRATROPIUM BROMIDE AND ALBUTEROL SULFATE 2.5; .5 MG/3ML; MG/3ML
3 SOLUTION RESPIRATORY (INHALATION) EVERY 8 HOURS
Status: DISCONTINUED | OUTPATIENT
Start: 2024-01-01 | End: 2024-01-01

## 2024-01-01 RX ORDER — IPRATROPIUM BROMIDE AND ALBUTEROL SULFATE 2.5; .5 MG/3ML; MG/3ML
3 SOLUTION RESPIRATORY (INHALATION)
Status: DISCONTINUED | OUTPATIENT
Start: 2024-01-01 | End: 2024-01-01

## 2024-01-01 RX ORDER — FENOFIBRATE 160 MG/1
160 TABLET ORAL DAILY
Status: DISCONTINUED | OUTPATIENT
Start: 2024-01-01 | End: 2024-01-01

## 2024-01-01 RX ORDER — ONDANSETRON HYDROCHLORIDE 2 MG/ML
4 INJECTION, SOLUTION INTRAVENOUS EVERY 6 HOURS PRN
Qty: 20 ML
Start: 2024-01-01 | End: 2024-05-28

## 2024-01-01 RX ORDER — INSULIN LISPRO 100 [IU]/ML
0-10 INJECTION, SOLUTION INTRAVENOUS; SUBCUTANEOUS
Start: 2024-01-01 | End: 2024-01-01 | Stop reason: HOSPADM

## 2024-01-01 RX ORDER — EPINEPHRINE 0.1 MG/ML
INJECTION INTRACARDIAC; INTRAVENOUS CODE/TRAUMA/SEDATION MEDICATION
Status: COMPLETED | OUTPATIENT
Start: 2024-01-01 | End: 2024-01-01

## 2024-01-01 RX ORDER — ACETYLCYSTEINE 200 MG/ML
3 SOLUTION ORAL; RESPIRATORY (INHALATION)
Status: DISCONTINUED | OUTPATIENT
Start: 2024-01-01 | End: 2024-01-01 | Stop reason: HOSPADM

## 2024-01-01 RX ORDER — MAGNESIUM SULFATE HEPTAHYDRATE 40 MG/ML
2 INJECTION, SOLUTION INTRAVENOUS ONCE
Status: COMPLETED | OUTPATIENT
Start: 2024-01-01 | End: 2024-01-01

## 2024-01-01 RX ORDER — AMOXICILLIN 250 MG
1 CAPSULE ORAL NIGHTLY PRN
Status: DISCONTINUED | OUTPATIENT
Start: 2024-01-01 | End: 2024-01-01

## 2024-01-01 RX ORDER — VANCOMYCIN 750 MG/150ML
750 INJECTION, SOLUTION INTRAVENOUS ONCE
Status: COMPLETED | OUTPATIENT
Start: 2024-01-01 | End: 2024-01-01

## 2024-01-01 RX ORDER — AMOXICILLIN 250 MG
1 CAPSULE ORAL NIGHTLY PRN
Start: 2024-01-01 | End: 2024-05-28

## 2024-01-01 RX ORDER — NYSTATIN 100000 [USP'U]/G
1 POWDER TOPICAL ONCE
Status: COMPLETED | OUTPATIENT
Start: 2024-01-01 | End: 2024-01-01

## 2024-01-01 RX ORDER — ALBUMIN HUMAN 250 G/1000ML
12.5 SOLUTION INTRAVENOUS DAILY
Status: DISCONTINUED | OUTPATIENT
Start: 2024-01-01 | End: 2024-01-01

## 2024-01-01 RX ORDER — NYSTATIN 100000 U/G
CREAM TOPICAL 2 TIMES DAILY
Status: DISCONTINUED | OUTPATIENT
Start: 2024-01-01 | End: 2024-01-01 | Stop reason: HOSPADM

## 2024-01-01 RX ORDER — ACETAMINOPHEN 325 MG/1
650 TABLET ORAL ONCE
Status: DISCONTINUED | OUTPATIENT
Start: 2024-01-01 | End: 2024-01-01

## 2024-01-01 RX ORDER — NOREPINEPHRINE BITARTRATE/D5W 8 MG/250ML
0.05 PLASTIC BAG, INJECTION (ML) INTRAVENOUS CONTINUOUS
Status: DISCONTINUED | OUTPATIENT
Start: 2024-01-01 | End: 2024-01-01 | Stop reason: HOSPADM

## 2024-01-01 RX ORDER — VANCOMYCIN 750 MG/150ML
750 INJECTION, SOLUTION INTRAVENOUS EVERY 24 HOURS
Status: DISCONTINUED | OUTPATIENT
Start: 2024-01-01 | End: 2024-01-01

## 2024-01-01 RX ORDER — GABAPENTIN 100 MG/1
100 CAPSULE ORAL 3 TIMES DAILY
Start: 2024-01-01 | End: 2024-05-28

## 2024-01-01 RX ORDER — PANTOPRAZOLE SODIUM 40 MG/1
40 TABLET, DELAYED RELEASE ORAL DAILY
Start: 2024-01-01 | End: 2024-05-28

## 2024-01-01 RX ORDER — DOXYCYCLINE 100 MG/1
100 CAPSULE ORAL DAILY
Status: DISCONTINUED | OUTPATIENT
Start: 2024-01-01 | End: 2024-01-01

## 2024-01-01 RX ORDER — ALBUMIN HUMAN 250 G/1000ML
12.5 SOLUTION INTRAVENOUS
Status: DISCONTINUED | OUTPATIENT
Start: 2024-01-01 | End: 2024-01-01 | Stop reason: HOSPADM

## 2024-01-01 RX ORDER — LIDOCAINE HYDROCHLORIDE 10 MG/ML
5 INJECTION INFILTRATION; PERINEURAL ONCE
Status: DISCONTINUED | OUTPATIENT
Start: 2024-01-01 | End: 2024-01-01 | Stop reason: HOSPADM

## 2024-01-01 RX ORDER — BUDESONIDE 0.5 MG/2ML
0.25 INHALANT ORAL
Status: DISCONTINUED | OUTPATIENT
Start: 2024-01-01 | End: 2024-01-01 | Stop reason: HOSPADM

## 2024-01-01 RX ORDER — VANCOMYCIN HYDROCHLORIDE 250 MG/1
CAPSULE ORAL
Refills: 0 | Status: CANCELLED
Start: 2024-01-01 | End: 2024-01-01

## 2024-01-01 RX ORDER — NYSTATIN 100000 [USP'U]/G
POWDER TOPICAL 2 TIMES DAILY
Start: 2024-01-01 | End: 2024-05-28

## 2024-01-01 RX ORDER — VANCOMYCIN HYDROCHLORIDE 125 MG/1
125 CAPSULE ORAL 2 TIMES DAILY
Status: DISCONTINUED | OUTPATIENT
Start: 2024-01-01 | End: 2024-01-01 | Stop reason: HOSPADM

## 2024-01-01 RX ORDER — SODIUM CHLORIDE FOR INHALATION 3 %
3 VIAL, NEBULIZER (ML) INHALATION EVERY 6 HOURS SCHEDULED
Status: DISCONTINUED | OUTPATIENT
Start: 2024-01-01 | End: 2024-01-01

## 2024-01-01 RX ORDER — ACETAMINOPHEN 160 MG/5ML
1000 SOLUTION ORAL 3 TIMES DAILY
Status: DISCONTINUED | OUTPATIENT
Start: 2024-01-01 | End: 2024-01-01 | Stop reason: SDUPTHER

## 2024-01-01 RX ORDER — ZINC OXIDE 20 G/100G
1 OINTMENT TOPICAL 2 TIMES DAILY
Status: DISCONTINUED | OUTPATIENT
Start: 2024-01-01 | End: 2024-01-01 | Stop reason: HOSPADM

## 2024-01-01 RX ORDER — SIMVASTATIN 20 MG/1
20 TABLET, FILM COATED ORAL NIGHTLY
Status: DISCONTINUED | OUTPATIENT
Start: 2024-01-01 | End: 2024-01-01

## 2024-01-01 RX ORDER — LORAZEPAM 2 MG/ML
2 INJECTION INTRAMUSCULAR ONCE
Status: COMPLETED | OUTPATIENT
Start: 2024-01-01 | End: 2024-01-01

## 2024-01-01 RX ORDER — HEPARIN 100 UNIT/ML
1.6 SYRINGE INTRAVENOUS AS NEEDED
Status: DISCONTINUED | OUTPATIENT
Start: 2024-01-01 | End: 2024-01-01 | Stop reason: HOSPADM

## 2024-01-01 RX ORDER — MIDODRINE HYDROCHLORIDE 5 MG/1
15 TABLET ORAL
Status: CANCELLED
Start: 2024-01-01

## 2024-01-01 RX ORDER — ZINC OXIDE 20 G/100G
1 OINTMENT TOPICAL 2 TIMES DAILY
Start: 2024-01-01 | End: 2024-05-28

## 2024-01-01 RX ORDER — DOXYCYCLINE 100 MG/1
100 CAPSULE ORAL DAILY
Start: 2024-01-01 | End: 2024-05-28

## 2024-01-01 RX ORDER — NYSTATIN 100000 U/G
OINTMENT TOPICAL 4 TIMES DAILY
Status: DISCONTINUED | OUTPATIENT
Start: 2024-01-01 | End: 2024-01-01

## 2024-01-01 RX ORDER — MIDODRINE HYDROCHLORIDE 10 MG/1
30 TABLET ORAL
Start: 2024-01-01 | End: 2024-05-28

## 2024-01-01 RX ORDER — ALBUMIN HUMAN 250 G/1000ML
25 SOLUTION INTRAVENOUS
Status: DISPENSED | OUTPATIENT
Start: 2024-01-01 | End: 2024-01-01

## 2024-01-01 RX ORDER — SIMVASTATIN 20 MG/1
20 TABLET, FILM COATED ORAL DAILY
Qty: 60 TABLET | Refills: 5 | Status: SHIPPED | OUTPATIENT
Start: 2024-01-01 | End: 2024-01-01 | Stop reason: HOSPADM

## 2024-01-01 RX ORDER — PROMETHAZINE HYDROCHLORIDE 25 MG/ML
12.5 INJECTION, SOLUTION INTRAMUSCULAR; INTRAVENOUS EVERY 6 HOURS PRN
Status: DISCONTINUED | OUTPATIENT
Start: 2024-01-01 | End: 2024-01-01 | Stop reason: ALTCHOICE

## 2024-01-01 RX ORDER — AMMONIUM LACTATE 12 G/100G
1 LOTION TOPICAL
Status: DISCONTINUED | OUTPATIENT
Start: 2024-01-01 | End: 2024-01-01 | Stop reason: HOSPADM

## 2024-01-01 RX ORDER — IPRATROPIUM BROMIDE AND ALBUTEROL SULFATE 2.5; .5 MG/3ML; MG/3ML
3 SOLUTION RESPIRATORY (INHALATION) 4 TIMES DAILY PRN
Start: 2024-01-01 | End: 2024-05-28

## 2024-01-01 RX ORDER — ONDANSETRON HYDROCHLORIDE 2 MG/ML
4 INJECTION, SOLUTION INTRAVENOUS EVERY 6 HOURS PRN
Status: DISCONTINUED | OUTPATIENT
Start: 2024-01-01 | End: 2024-01-01 | Stop reason: HOSPADM

## 2024-01-01 RX ORDER — POTASSIUM CHLORIDE 14.9 MG/ML
20 INJECTION INTRAVENOUS
Status: DISCONTINUED | OUTPATIENT
Start: 2024-01-01 | End: 2024-01-01

## 2024-01-01 RX ORDER — NOREPINEPHRINE BITARTRATE/D5W 8 MG/250ML
.01-3 PLASTIC BAG, INJECTION (ML) INTRAVENOUS CONTINUOUS
Status: CANCELLED | OUTPATIENT
Start: 2024-01-01

## 2024-01-01 RX ORDER — ACETAMINOPHEN 325 MG/1
650 TABLET ORAL 3 TIMES DAILY
Status: DISCONTINUED | OUTPATIENT
Start: 2024-01-01 | End: 2024-01-01

## 2024-01-01 RX ORDER — NOREPINEPHRINE BITARTRATE/D5W 8 MG/250ML
0.05 PLASTIC BAG, INJECTION (ML) INTRAVENOUS CONTINUOUS
Status: DISCONTINUED | OUTPATIENT
Start: 2024-01-01 | End: 2024-01-01

## 2024-01-01 RX ORDER — VANCOMYCIN 1.25 G/250ML
1750 INJECTION, SOLUTION INTRAVENOUS ONCE
Status: COMPLETED | OUTPATIENT
Start: 2024-01-01 | End: 2024-01-01

## 2024-01-01 RX ORDER — SODIUM CHLORIDE FOR INHALATION 3 %
3 VIAL, NEBULIZER (ML) INHALATION
Status: DISCONTINUED | OUTPATIENT
Start: 2024-01-01 | End: 2024-01-01

## 2024-01-01 RX ORDER — AMOXICILLIN 250 MG
1 CAPSULE ORAL NIGHTLY PRN
Status: DISCONTINUED | OUTPATIENT
Start: 2024-01-01 | End: 2024-01-01 | Stop reason: HOSPADM

## 2024-01-01 RX ORDER — FLUDROCORTISONE ACETATE 0.1 MG/1
0.1 TABLET ORAL 3 TIMES DAILY
Status: DISCONTINUED | OUTPATIENT
Start: 2024-01-01 | End: 2024-01-01

## 2024-01-01 RX ORDER — ACETAMINOPHEN 500 MG
1000 TABLET ORAL 3 TIMES DAILY
Status: DISCONTINUED | OUTPATIENT
Start: 2024-01-01 | End: 2024-01-01

## 2024-01-01 RX ORDER — DOXYCYCLINE 100 MG/1
100 CAPSULE ORAL DAILY
Status: CANCELLED
Start: 2024-01-01

## 2024-01-01 RX ORDER — POTASSIUM CHLORIDE 14.9 MG/ML
20 INJECTION INTRAVENOUS
Status: COMPLETED | OUTPATIENT
Start: 2024-01-01 | End: 2024-01-01

## 2024-01-01 RX ORDER — HEPARIN SODIUM 1000 [USP'U]/ML
2000 INJECTION, SOLUTION INTRAVENOUS; SUBCUTANEOUS
Start: 2024-01-01 | End: 2024-05-28

## 2024-01-01 RX ORDER — DEXTROSE 50 % IN WATER (D50W) INTRAVENOUS SYRINGE
25
Status: DISCONTINUED | OUTPATIENT
Start: 2024-01-01 | End: 2024-01-01 | Stop reason: HOSPADM

## 2024-01-01 RX ORDER — IPRATROPIUM BROMIDE AND ALBUTEROL SULFATE 2.5; .5 MG/3ML; MG/3ML
3 SOLUTION RESPIRATORY (INHALATION)
Status: DISCONTINUED | OUTPATIENT
Start: 2024-01-01 | End: 2024-01-01 | Stop reason: HOSPADM

## 2024-01-01 RX ORDER — ESCITALOPRAM OXALATE 10 MG/1
10 TABLET ORAL NIGHTLY
Start: 2024-01-01 | End: 2024-05-28

## 2024-01-01 RX ORDER — NYSTATIN 100000 U/G
CREAM TOPICAL 2 TIMES DAILY
Start: 2024-01-01 | End: 2024-05-28

## 2024-01-01 RX ORDER — GABAPENTIN 100 MG/1
100 CAPSULE ORAL 2 TIMES DAILY
Status: CANCELLED
Start: 2024-01-01

## 2024-01-01 RX ORDER — GABAPENTIN 100 MG/1
100 CAPSULE ORAL 2 TIMES DAILY
Start: 2024-01-01 | End: 2024-01-01 | Stop reason: HOSPADM

## 2024-01-01 RX ORDER — INSULIN LISPRO 100 [IU]/ML
0-10 INJECTION, SOLUTION INTRAVENOUS; SUBCUTANEOUS
Status: CANCELLED
Start: 2024-01-01

## 2024-01-01 RX ORDER — AMMONIUM LACTATE 12 G/100G
1 LOTION TOPICAL
Start: 2024-01-01 | End: 2024-05-28

## 2024-01-01 RX ORDER — DOXYCYCLINE 100 MG/1
100 CAPSULE ORAL DAILY
Start: 2024-01-01 | End: 2024-01-01 | Stop reason: HOSPADM

## 2024-01-01 RX ORDER — DEXTROSE MONOHYDRATE 100 MG/ML
0.3 INJECTION, SOLUTION INTRAVENOUS ONCE AS NEEDED
Status: DISCONTINUED | OUTPATIENT
Start: 2024-01-01 | End: 2024-01-01 | Stop reason: HOSPADM

## 2024-01-01 RX ORDER — VANCOMYCIN HYDROCHLORIDE 125 MG/1
125 CAPSULE ORAL 4 TIMES DAILY
Status: DISCONTINUED | OUTPATIENT
Start: 2024-01-01 | End: 2024-01-01

## 2024-01-01 RX ORDER — LANOLIN ALCOHOL/MO/W.PET/CERES
400 CREAM (GRAM) TOPICAL 2 TIMES DAILY
Status: COMPLETED | OUTPATIENT
Start: 2024-01-01 | End: 2024-01-01

## 2024-01-01 RX ORDER — GABAPENTIN 100 MG/1
100 CAPSULE ORAL 3 TIMES DAILY
Status: DISCONTINUED | OUTPATIENT
Start: 2024-01-01 | End: 2024-01-01 | Stop reason: HOSPADM

## 2024-01-01 RX ORDER — MIDODRINE HYDROCHLORIDE 10 MG/1
30 TABLET ORAL
Status: DISCONTINUED | OUTPATIENT
Start: 2024-01-01 | End: 2024-01-01 | Stop reason: HOSPADM

## 2024-01-01 RX ORDER — GABAPENTIN 100 MG/1
100 CAPSULE ORAL 2 TIMES DAILY
Status: DISCONTINUED | OUTPATIENT
Start: 2024-01-01 | End: 2024-01-01

## 2024-01-01 RX ADMIN — NYSTATIN: 100000 CREAM TOPICAL at 10:00

## 2024-01-01 RX ADMIN — APIXABAN 2.5 MG: 2.5 TABLET, FILM COATED ORAL at 20:29

## 2024-01-01 RX ADMIN — NYSTATIN: 100000 CREAM TOPICAL at 21:08

## 2024-01-01 RX ADMIN — FLUDROCORTISONE ACETATE 0.1 MG: 0.1 TABLET ORAL at 09:47

## 2024-01-01 RX ADMIN — FLUDROCORTISONE ACETATE 0.1 MG: 0.1 TABLET ORAL at 16:29

## 2024-01-01 RX ADMIN — INSULIN LISPRO 4 UNITS: 100 INJECTION, SOLUTION INTRAVENOUS; SUBCUTANEOUS at 12:50

## 2024-01-01 RX ADMIN — SIMVASTATIN 40 MG: 40 TABLET, FILM COATED ORAL at 21:24

## 2024-01-01 RX ADMIN — GABAPENTIN 100 MG: 100 CAPSULE ORAL at 21:02

## 2024-01-01 RX ADMIN — GABAPENTIN 100 MG: 100 CAPSULE ORAL at 18:58

## 2024-01-01 RX ADMIN — ACETAMINOPHEN 650 MG: 325 TABLET ORAL at 08:46

## 2024-01-01 RX ADMIN — FLUDROCORTISONE ACETATE 0.1 MG: 0.1 TABLET ORAL at 20:06

## 2024-01-01 RX ADMIN — MIDODRINE HYDROCHLORIDE 15 MG: 5 TABLET ORAL at 17:40

## 2024-01-01 RX ADMIN — NYSTATIN: 100000 CREAM TOPICAL at 21:16

## 2024-01-01 RX ADMIN — ESCITALOPRAM OXALATE 10 MG: 10 TABLET ORAL at 20:09

## 2024-01-01 RX ADMIN — RUGBY ZINC OXIDE 20% 1 APPLICATION: 20 OINTMENT TOPICAL at 09:45

## 2024-01-01 RX ADMIN — VANCOMYCIN HYDROCHLORIDE 125 MG: 125 CAPSULE ORAL at 08:12

## 2024-01-01 RX ADMIN — INSULIN LISPRO 2 UNITS: 100 INJECTION, SOLUTION INTRAVENOUS; SUBCUTANEOUS at 17:45

## 2024-01-01 RX ADMIN — IPRATROPIUM BROMIDE AND ALBUTEROL SULFATE 3 ML: 2.5; .5 SOLUTION RESPIRATORY (INHALATION) at 07:21

## 2024-01-01 RX ADMIN — NYSTATIN 1 APPLICATION: 100000 CREAM TOPICAL at 11:32

## 2024-01-01 RX ADMIN — NYSTATIN: 100000 CREAM TOPICAL at 21:00

## 2024-01-01 RX ADMIN — VANCOMYCIN HYDROCHLORIDE 125 MG: 125 CAPSULE ORAL at 08:08

## 2024-01-01 RX ADMIN — HEPARIN SODIUM 1600 UNITS: 1000 INJECTION INTRAVENOUS; SUBCUTANEOUS at 14:55

## 2024-01-01 RX ADMIN — MIDODRINE HYDROCHLORIDE 15 MG: 5 TABLET ORAL at 14:01

## 2024-01-01 RX ADMIN — INSULIN LISPRO 2 UNITS: 100 INJECTION, SOLUTION INTRAVENOUS; SUBCUTANEOUS at 08:59

## 2024-01-01 RX ADMIN — IPRATROPIUM BROMIDE AND ALBUTEROL SULFATE 3 ML: 2.5; .5 SOLUTION RESPIRATORY (INHALATION) at 19:04

## 2024-01-01 RX ADMIN — HEPARIN SODIUM 2000 UNITS: 1000 INJECTION INTRAVENOUS; SUBCUTANEOUS at 13:44

## 2024-01-01 RX ADMIN — MIDODRINE HYDROCHLORIDE 20 MG: 10 TABLET ORAL at 13:23

## 2024-01-01 RX ADMIN — NYSTATIN: 100000 POWDER TOPICAL at 20:49

## 2024-01-01 RX ADMIN — SODIUM CHLORIDE SOLN NEBU 3% 3 ML: 3 NEBU SOLN at 20:36

## 2024-01-01 RX ADMIN — INSULIN LISPRO 6 UNITS: 100 INJECTION, SOLUTION INTRAVENOUS; SUBCUTANEOUS at 13:47

## 2024-01-01 RX ADMIN — IPRATROPIUM BROMIDE AND ALBUTEROL SULFATE 3 ML: 2.5; .5 SOLUTION RESPIRATORY (INHALATION) at 11:41

## 2024-01-01 RX ADMIN — FLUDROCORTISONE ACETATE 0.1 MG: 0.1 TABLET ORAL at 21:29

## 2024-01-01 RX ADMIN — INSULIN LISPRO 4 UNITS: 100 INJECTION, SOLUTION INTRAVENOUS; SUBCUTANEOUS at 17:56

## 2024-01-01 RX ADMIN — NOREPINEPHRINE BITARTRATE 0.07 MCG/KG/MIN: 8 INJECTION, SOLUTION INTRAVENOUS at 13:14

## 2024-01-01 RX ADMIN — VANCOMYCIN HYDROCHLORIDE 125 MG: 125 CAPSULE ORAL at 09:34

## 2024-01-01 RX ADMIN — PANTOPRAZOLE SODIUM 40 MG: 40 TABLET, DELAYED RELEASE ORAL at 08:42

## 2024-01-01 RX ADMIN — APIXABAN 2.5 MG: 2.5 TABLET, FILM COATED ORAL at 20:06

## 2024-01-01 RX ADMIN — VANCOMYCIN HYDROCHLORIDE 125 MG: 125 CAPSULE ORAL at 21:18

## 2024-01-01 RX ADMIN — NYSTATIN: 100000 CREAM TOPICAL at 10:10

## 2024-01-01 RX ADMIN — RUGBY ZINC OXIDE 20% 1 APPLICATION: 20 OINTMENT TOPICAL at 08:21

## 2024-01-01 RX ADMIN — MIDODRINE HYDROCHLORIDE 20 MG: 10 TABLET ORAL at 08:07

## 2024-01-01 RX ADMIN — SODIUM CHLORIDE SOLN NEBU 3% 3 ML: 3 NEBU SOLN at 11:05

## 2024-01-01 RX ADMIN — NYSTATIN: 100000 POWDER TOPICAL at 21:19

## 2024-01-01 RX ADMIN — RUGBY ZINC OXIDE 20% 1 APPLICATION: 20 OINTMENT TOPICAL at 08:38

## 2024-01-01 RX ADMIN — MIDODRINE HYDROCHLORIDE 15 MG: 5 TABLET ORAL at 18:58

## 2024-01-01 RX ADMIN — ACETAMINOPHEN 650 MG: 325 TABLET ORAL at 14:55

## 2024-01-01 RX ADMIN — NYSTATIN: 100000 CREAM TOPICAL at 10:50

## 2024-01-01 RX ADMIN — Medication 400 MG: at 20:13

## 2024-01-01 RX ADMIN — GABAPENTIN 100 MG: 100 CAPSULE ORAL at 09:54

## 2024-01-01 RX ADMIN — NOREPINEPHRINE BITARTRATE 0.06 MCG/KG/MIN: 8 INJECTION, SOLUTION INTRAVENOUS at 00:05

## 2024-01-01 RX ADMIN — GABAPENTIN 100 MG: 100 CAPSULE ORAL at 20:10

## 2024-01-01 RX ADMIN — PANTOPRAZOLE SODIUM 40 MG: 40 TABLET, DELAYED RELEASE ORAL at 08:31

## 2024-01-01 RX ADMIN — FLUDROCORTISONE ACETATE 0.1 MG: 0.1 TABLET ORAL at 08:56

## 2024-01-01 RX ADMIN — NYSTATIN: 100000 CREAM TOPICAL at 10:33

## 2024-01-01 RX ADMIN — PANTOPRAZOLE SODIUM 40 MG: 40 TABLET, DELAYED RELEASE ORAL at 08:32

## 2024-01-01 RX ADMIN — ESCITALOPRAM OXALATE 10 MG: 10 TABLET ORAL at 20:35

## 2024-01-01 RX ADMIN — FLUDROCORTISONE ACETATE 0.1 MG: 0.1 TABLET ORAL at 09:28

## 2024-01-01 RX ADMIN — MIDODRINE HYDROCHLORIDE 15 MG: 5 TABLET ORAL at 08:10

## 2024-01-01 RX ADMIN — EPOETIN ALFA-EPBX 10000 UNITS: 10000 INJECTION, SOLUTION INTRAVENOUS; SUBCUTANEOUS at 11:34

## 2024-01-01 RX ADMIN — PANTOPRAZOLE SODIUM 40 MG: 40 TABLET, DELAYED RELEASE ORAL at 09:32

## 2024-01-01 RX ADMIN — GABAPENTIN 100 MG: 100 CAPSULE ORAL at 08:57

## 2024-01-01 RX ADMIN — INSULIN LISPRO 4 UNITS: 100 INJECTION, SOLUTION INTRAVENOUS; SUBCUTANEOUS at 10:07

## 2024-01-01 RX ADMIN — NOREPINEPHRINE BITARTRATE 0.04 MCG/KG/MIN: 8 INJECTION, SOLUTION INTRAVENOUS at 17:36

## 2024-01-01 RX ADMIN — IPRATROPIUM BROMIDE AND ALBUTEROL SULFATE 3 ML: 2.5; .5 SOLUTION RESPIRATORY (INHALATION) at 10:45

## 2024-01-01 RX ADMIN — ESCITALOPRAM OXALATE 10 MG: 10 TABLET ORAL at 21:10

## 2024-01-01 RX ADMIN — GABAPENTIN 100 MG: 100 CAPSULE ORAL at 20:09

## 2024-01-01 RX ADMIN — RUGBY ZINC OXIDE 20% 1 APPLICATION: 20 OINTMENT TOPICAL at 20:09

## 2024-01-01 RX ADMIN — DOXYCYCLINE HYCLATE 100 MG: 100 CAPSULE ORAL at 14:48

## 2024-01-01 RX ADMIN — PANTOPRAZOLE SODIUM 40 MG: 40 TABLET, DELAYED RELEASE ORAL at 08:12

## 2024-01-01 RX ADMIN — DOXYCYCLINE HYCLATE 100 MG: 100 CAPSULE ORAL at 12:05

## 2024-01-01 RX ADMIN — APIXABAN 2.5 MG: 2.5 TABLET, FILM COATED ORAL at 21:52

## 2024-01-01 RX ADMIN — GABAPENTIN 100 MG: 100 CAPSULE ORAL at 08:23

## 2024-01-01 RX ADMIN — RUGBY ZINC OXIDE 20% 1 APPLICATION: 20 OINTMENT TOPICAL at 21:00

## 2024-01-01 RX ADMIN — VANCOMYCIN HYDROCHLORIDE 125 MG: 125 CAPSULE ORAL at 21:13

## 2024-01-01 RX ADMIN — INSULIN LISPRO 2 UNITS: 100 INJECTION, SOLUTION INTRAVENOUS; SUBCUTANEOUS at 08:29

## 2024-01-01 RX ADMIN — ACETAMINOPHEN 1000 MG: 500 TABLET ORAL at 21:29

## 2024-01-01 RX ADMIN — VANCOMYCIN HYDROCHLORIDE 125 MG: 125 CAPSULE ORAL at 10:31

## 2024-01-01 RX ADMIN — RUGBY ZINC OXIDE 20% 1 APPLICATION: 20 OINTMENT TOPICAL at 08:29

## 2024-01-01 RX ADMIN — VANCOMYCIN HYDROCHLORIDE 125 MG: 125 CAPSULE ORAL at 08:48

## 2024-01-01 RX ADMIN — GABAPENTIN 100 MG: 100 CAPSULE ORAL at 21:44

## 2024-01-01 RX ADMIN — MIDODRINE HYDROCHLORIDE 15 MG: 5 TABLET ORAL at 08:19

## 2024-01-01 RX ADMIN — APIXABAN 2.5 MG: 2.5 TABLET, FILM COATED ORAL at 22:17

## 2024-01-01 RX ADMIN — IPRATROPIUM BROMIDE AND ALBUTEROL SULFATE 3 ML: 2.5; .5 SOLUTION RESPIRATORY (INHALATION) at 07:09

## 2024-01-01 RX ADMIN — DOXYCYCLINE HYCLATE 100 MG: 100 CAPSULE ORAL at 13:02

## 2024-01-01 RX ADMIN — APIXABAN 2.5 MG: 2.5 TABLET, FILM COATED ORAL at 09:32

## 2024-01-01 RX ADMIN — HEPARIN SODIUM 1600 UNITS: 1000 INJECTION INTRAVENOUS; SUBCUTANEOUS at 18:44

## 2024-01-01 RX ADMIN — RUGBY ZINC OXIDE 20% 1 APPLICATION: 20 OINTMENT TOPICAL at 20:52

## 2024-01-01 RX ADMIN — GABAPENTIN 100 MG: 100 CAPSULE ORAL at 08:32

## 2024-01-01 RX ADMIN — ACETAMINOPHEN 650 MG: 325 TABLET ORAL at 20:11

## 2024-01-01 RX ADMIN — INSULIN LISPRO 2 UNITS: 100 INJECTION, SOLUTION INTRAVENOUS; SUBCUTANEOUS at 17:10

## 2024-01-01 RX ADMIN — MIDODRINE HYDROCHLORIDE 15 MG: 5 TABLET ORAL at 11:39

## 2024-01-01 RX ADMIN — VANCOMYCIN HYDROCHLORIDE 125 MG: 125 CAPSULE ORAL at 08:17

## 2024-01-01 RX ADMIN — FLUDROCORTISONE ACETATE 0.1 MG: 0.1 TABLET ORAL at 17:29

## 2024-01-01 RX ADMIN — NYSTATIN: 100000 POWDER TOPICAL at 21:24

## 2024-01-01 RX ADMIN — APIXABAN 2.5 MG: 2.5 TABLET, FILM COATED ORAL at 08:10

## 2024-01-01 RX ADMIN — PANTOPRAZOLE SODIUM 40 MG: 40 TABLET, DELAYED RELEASE ORAL at 09:22

## 2024-01-01 RX ADMIN — FLUDROCORTISONE ACETATE 0.1 MG: 0.1 TABLET ORAL at 17:04

## 2024-01-01 RX ADMIN — APIXABAN 2.5 MG: 2.5 TABLET, FILM COATED ORAL at 09:16

## 2024-01-01 RX ADMIN — APIXABAN 2.5 MG: 2.5 TABLET, FILM COATED ORAL at 20:55

## 2024-01-01 RX ADMIN — FLUDROCORTISONE ACETATE 0.1 MG: 0.1 TABLET ORAL at 20:50

## 2024-01-01 RX ADMIN — ESCITALOPRAM OXALATE 10 MG: 10 TABLET ORAL at 20:19

## 2024-01-01 RX ADMIN — IPRATROPIUM BROMIDE AND ALBUTEROL SULFATE 3 ML: 2.5; .5 SOLUTION RESPIRATORY (INHALATION) at 20:09

## 2024-01-01 RX ADMIN — RUGBY ZINC OXIDE 20% 1 APPLICATION: 20 OINTMENT TOPICAL at 08:37

## 2024-01-01 RX ADMIN — RUGBY ZINC OXIDE 20% 1 APPLICATION: 20 OINTMENT TOPICAL at 20:53

## 2024-01-01 RX ADMIN — PIPERACILLIN SODIUM AND TAZOBACTAM SODIUM 2.25 G: 2; .25 INJECTION, SOLUTION INTRAVENOUS at 21:29

## 2024-01-01 RX ADMIN — SODIUM CHLORIDE SOLN NEBU 3% 3 ML: 3 NEBU SOLN at 07:59

## 2024-01-01 RX ADMIN — SODIUM CHLORIDE SOLN NEBU 3% 3 ML: 3 NEBU SOLN at 11:42

## 2024-01-01 RX ADMIN — MIDODRINE HYDROCHLORIDE 15 MG: 5 TABLET ORAL at 08:48

## 2024-01-01 RX ADMIN — VANCOMYCIN HYDROCHLORIDE 125 MG: 125 CAPSULE ORAL at 09:35

## 2024-01-01 RX ADMIN — MIDODRINE HYDROCHLORIDE 15 MG: 5 TABLET ORAL at 17:48

## 2024-01-01 RX ADMIN — MIDODRINE HYDROCHLORIDE 15 MG: 5 TABLET ORAL at 13:44

## 2024-01-01 RX ADMIN — DOXYCYCLINE HYCLATE 100 MG: 100 CAPSULE ORAL at 12:09

## 2024-01-01 RX ADMIN — APIXABAN 2.5 MG: 2.5 TABLET, FILM COATED ORAL at 09:09

## 2024-01-01 RX ADMIN — IPRATROPIUM BROMIDE AND ALBUTEROL SULFATE 3 ML: 2.5; .5 SOLUTION RESPIRATORY (INHALATION) at 14:10

## 2024-01-01 RX ADMIN — MIDODRINE HYDROCHLORIDE 15 MG: 5 TABLET ORAL at 17:43

## 2024-01-01 RX ADMIN — RUGBY ZINC OXIDE 20% 1 APPLICATION: 20 OINTMENT TOPICAL at 22:51

## 2024-01-01 RX ADMIN — VANCOMYCIN HYDROCHLORIDE 125 MG: 125 CAPSULE ORAL at 21:46

## 2024-01-01 RX ADMIN — DOXYCYCLINE 100 MG: 100 INJECTION, POWDER, LYOPHILIZED, FOR SOLUTION INTRAVENOUS at 14:21

## 2024-01-01 RX ADMIN — SODIUM CHLORIDE SOLN NEBU 3% 3 ML: 3 NEBU SOLN at 08:09

## 2024-01-01 RX ADMIN — MIDODRINE HYDROCHLORIDE 15 MG: 5 TABLET ORAL at 13:03

## 2024-01-01 RX ADMIN — NOREPINEPHRINE BITARTRATE 0.12 MCG/KG/MIN: 8 INJECTION, SOLUTION INTRAVENOUS at 04:24

## 2024-01-01 RX ADMIN — APIXABAN 2.5 MG: 2.5 TABLET, FILM COATED ORAL at 21:24

## 2024-01-01 RX ADMIN — INSULIN LISPRO 2 UNITS: 100 INJECTION, SOLUTION INTRAVENOUS; SUBCUTANEOUS at 18:36

## 2024-01-01 RX ADMIN — GABAPENTIN 100 MG: 100 CAPSULE ORAL at 20:19

## 2024-01-01 RX ADMIN — DOXYCYCLINE HYCLATE 100 MG: 100 CAPSULE ORAL at 11:33

## 2024-01-01 RX ADMIN — ESCITALOPRAM OXALATE 10 MG: 10 TABLET ORAL at 21:27

## 2024-01-01 RX ADMIN — Medication 1 APPLICATION: at 12:11

## 2024-01-01 RX ADMIN — MIDODRINE HYDROCHLORIDE 20 MG: 10 TABLET ORAL at 10:06

## 2024-01-01 RX ADMIN — MIDODRINE HYDROCHLORIDE 15 MG: 5 TABLET ORAL at 12:05

## 2024-01-01 RX ADMIN — ACETYLCYSTEINE 600 MG: 200 INHALANT RESPIRATORY (INHALATION) at 07:35

## 2024-01-01 RX ADMIN — MIDODRINE HYDROCHLORIDE 15 MG: 5 TABLET ORAL at 13:07

## 2024-01-01 RX ADMIN — VANCOMYCIN HYDROCHLORIDE 125 MG: 125 CAPSULE ORAL at 20:06

## 2024-01-01 RX ADMIN — INSULIN LISPRO 2 UNITS: 100 INJECTION, SOLUTION INTRAVENOUS; SUBCUTANEOUS at 09:47

## 2024-01-01 RX ADMIN — IPRATROPIUM BROMIDE AND ALBUTEROL SULFATE 3 ML: 2.5; .5 SOLUTION RESPIRATORY (INHALATION) at 07:41

## 2024-01-01 RX ADMIN — INSULIN LISPRO 2 UNITS: 100 INJECTION, SOLUTION INTRAVENOUS; SUBCUTANEOUS at 08:56

## 2024-01-01 RX ADMIN — ESCITALOPRAM OXALATE 10 MG: 10 TABLET ORAL at 20:06

## 2024-01-01 RX ADMIN — RUGBY ZINC OXIDE 20% 1 APPLICATION: 20 OINTMENT TOPICAL at 21:30

## 2024-01-01 RX ADMIN — ACETAMINOPHEN 650 MG: 325 TABLET ORAL at 14:16

## 2024-01-01 RX ADMIN — APIXABAN 2.5 MG: 2.5 TABLET, FILM COATED ORAL at 20:53

## 2024-01-01 RX ADMIN — NYSTATIN: 100000 POWDER TOPICAL at 09:34

## 2024-01-01 RX ADMIN — MIDODRINE HYDROCHLORIDE 15 MG: 5 TABLET ORAL at 11:29

## 2024-01-01 RX ADMIN — GABAPENTIN 100 MG: 100 CAPSULE ORAL at 14:25

## 2024-01-01 RX ADMIN — IPRATROPIUM BROMIDE AND ALBUTEROL SULFATE 3 ML: 2.5; .5 SOLUTION RESPIRATORY (INHALATION) at 11:46

## 2024-01-01 RX ADMIN — PSYLLIUM HUSK 1 PACKET: 3.4 POWDER ORAL at 20:10

## 2024-01-01 RX ADMIN — RUGBY ZINC OXIDE 20% 1 APPLICATION: 20 OINTMENT TOPICAL at 21:44

## 2024-01-01 RX ADMIN — GABAPENTIN 100 MG: 100 CAPSULE ORAL at 08:48

## 2024-01-01 RX ADMIN — PANTOPRAZOLE SODIUM 40 MG: 40 TABLET, DELAYED RELEASE ORAL at 09:02

## 2024-01-01 RX ADMIN — IPRATROPIUM BROMIDE AND ALBUTEROL SULFATE 3 ML: 2.5; .5 SOLUTION RESPIRATORY (INHALATION) at 07:52

## 2024-01-01 RX ADMIN — GABAPENTIN 100 MG: 100 CAPSULE ORAL at 15:46

## 2024-01-01 RX ADMIN — ACETAMINOPHEN 650 MG: 325 TABLET ORAL at 20:08

## 2024-01-01 RX ADMIN — PANTOPRAZOLE SODIUM 40 MG: 40 TABLET, DELAYED RELEASE ORAL at 08:36

## 2024-01-01 RX ADMIN — ALBUMIN (HUMAN) 12.5 G: 12.5 SOLUTION INTRAVENOUS at 13:39

## 2024-01-01 RX ADMIN — PSYLLIUM HUSK 1 PACKET: 3.4 POWDER ORAL at 08:58

## 2024-01-01 RX ADMIN — NYSTATIN: 100000 POWDER TOPICAL at 10:36

## 2024-01-01 RX ADMIN — VANCOMYCIN HYDROCHLORIDE 125 MG: 125 CAPSULE ORAL at 09:04

## 2024-01-01 RX ADMIN — ESCITALOPRAM OXALATE 10 MG: 10 TABLET ORAL at 20:29

## 2024-01-01 RX ADMIN — PANTOPRAZOLE SODIUM 40 MG: 40 TABLET, DELAYED RELEASE ORAL at 08:47

## 2024-01-01 RX ADMIN — DOXYCYCLINE 100 MG: 100 INJECTION, POWDER, LYOPHILIZED, FOR SOLUTION INTRAVENOUS at 17:49

## 2024-01-01 RX ADMIN — IPRATROPIUM BROMIDE AND ALBUTEROL SULFATE 3 ML: 2.5; .5 SOLUTION RESPIRATORY (INHALATION) at 11:55

## 2024-01-01 RX ADMIN — PSYLLIUM HUSK 1 PACKET: 3.4 POWDER ORAL at 20:39

## 2024-01-01 RX ADMIN — ESCITALOPRAM OXALATE 10 MG: 10 TABLET ORAL at 21:07

## 2024-01-01 RX ADMIN — Medication: at 19:33

## 2024-01-01 RX ADMIN — RUGBY ZINC OXIDE 20% 1 APPLICATION: 20 OINTMENT TOPICAL at 11:32

## 2024-01-01 RX ADMIN — CEFEPIME 1 G: 1 INJECTION, POWDER, FOR SOLUTION INTRAMUSCULAR; INTRAVENOUS at 21:24

## 2024-01-01 RX ADMIN — FLUDROCORTISONE ACETATE 0.1 MG: 0.1 TABLET ORAL at 16:24

## 2024-01-01 RX ADMIN — MIDODRINE HYDROCHLORIDE 15 MG: 5 TABLET ORAL at 12:56

## 2024-01-01 RX ADMIN — MIDODRINE HYDROCHLORIDE 15 MG: 5 TABLET ORAL at 09:28

## 2024-01-01 RX ADMIN — ESCITALOPRAM OXALATE 10 MG: 10 TABLET ORAL at 20:47

## 2024-01-01 RX ADMIN — APIXABAN 2.5 MG: 2.5 TABLET, FILM COATED ORAL at 09:35

## 2024-01-01 RX ADMIN — ALBUMIN HUMAN 12.5 G: 250 SOLUTION INTRAVENOUS at 17:17

## 2024-01-01 RX ADMIN — PANTOPRAZOLE SODIUM 40 MG: 40 TABLET, DELAYED RELEASE ORAL at 08:10

## 2024-01-01 RX ADMIN — MIDODRINE HYDROCHLORIDE 15 MG: 5 TABLET ORAL at 08:56

## 2024-01-01 RX ADMIN — GABAPENTIN 100 MG: 100 CAPSULE ORAL at 17:29

## 2024-01-01 RX ADMIN — GABAPENTIN 100 MG: 100 CAPSULE ORAL at 20:48

## 2024-01-01 RX ADMIN — MIDODRINE HYDROCHLORIDE 15 MG: 5 TABLET ORAL at 16:16

## 2024-01-01 RX ADMIN — IPRATROPIUM BROMIDE AND ALBUTEROL SULFATE 3 ML: 2.5; .5 SOLUTION RESPIRATORY (INHALATION) at 19:36

## 2024-01-01 RX ADMIN — FLUDROCORTISONE ACETATE 0.1 MG: 0.1 TABLET ORAL at 09:32

## 2024-01-01 RX ADMIN — VANCOMYCIN HYDROCHLORIDE 125 MG: 125 CAPSULE ORAL at 08:29

## 2024-01-01 RX ADMIN — NYSTATIN: 100000 POWDER TOPICAL at 21:46

## 2024-01-01 RX ADMIN — IPRATROPIUM BROMIDE AND ALBUTEROL SULFATE 3 ML: 2.5; .5 SOLUTION RESPIRATORY (INHALATION) at 07:24

## 2024-01-01 RX ADMIN — VANCOMYCIN HYDROCHLORIDE 125 MG: 125 CAPSULE ORAL at 08:46

## 2024-01-01 RX ADMIN — APIXABAN 2.5 MG: 2.5 TABLET, FILM COATED ORAL at 08:52

## 2024-01-01 RX ADMIN — INSULIN LISPRO 2 UNITS: 100 INJECTION, SOLUTION INTRAVENOUS; SUBCUTANEOUS at 09:18

## 2024-01-01 RX ADMIN — GABAPENTIN 100 MG: 100 CAPSULE ORAL at 20:56

## 2024-01-01 RX ADMIN — ACETAMINOPHEN 1000 MG: 500 TABLET ORAL at 08:17

## 2024-01-01 RX ADMIN — INSULIN LISPRO 2 UNITS: 100 INJECTION, SOLUTION INTRAVENOUS; SUBCUTANEOUS at 17:03

## 2024-01-01 RX ADMIN — CEFEPIME 1 G: 1 INJECTION, POWDER, FOR SOLUTION INTRAMUSCULAR; INTRAVENOUS at 22:18

## 2024-01-01 RX ADMIN — MIDODRINE HYDROCHLORIDE 15 MG: 5 TABLET ORAL at 09:00

## 2024-01-01 RX ADMIN — VANCOMYCIN HYDROCHLORIDE 125 MG: 125 CAPSULE ORAL at 08:58

## 2024-01-01 RX ADMIN — DOXYCYCLINE HYCLATE 100 MG: 100 CAPSULE ORAL at 09:00

## 2024-01-01 RX ADMIN — IPRATROPIUM BROMIDE AND ALBUTEROL SULFATE 3 ML: 2.5; .5 SOLUTION RESPIRATORY (INHALATION) at 07:54

## 2024-01-01 RX ADMIN — SODIUM CHLORIDE SOLN NEBU 3% 3 ML: 3 NEBU SOLN at 08:13

## 2024-01-01 RX ADMIN — APIXABAN 2.5 MG: 2.5 TABLET, FILM COATED ORAL at 09:21

## 2024-01-01 RX ADMIN — HEPARIN SODIUM 1600 UNITS: 1000 INJECTION INTRAVENOUS; SUBCUTANEOUS at 16:31

## 2024-01-01 RX ADMIN — MIDODRINE HYDROCHLORIDE 20 MG: 10 TABLET ORAL at 11:27

## 2024-01-01 RX ADMIN — HEPARIN SODIUM 1600 UNITS: 1000 INJECTION INTRAVENOUS; SUBCUTANEOUS at 14:05

## 2024-01-01 RX ADMIN — VANCOMYCIN HYDROCHLORIDE 125 MG: 125 CAPSULE ORAL at 10:03

## 2024-01-01 RX ADMIN — IPRATROPIUM BROMIDE AND ALBUTEROL SULFATE 3 ML: 2.5; .5 SOLUTION RESPIRATORY (INHALATION) at 07:23

## 2024-01-01 RX ADMIN — DOXYCYCLINE HYCLATE 100 MG: 100 CAPSULE ORAL at 14:16

## 2024-01-01 RX ADMIN — GABAPENTIN 100 MG: 100 CAPSULE ORAL at 09:32

## 2024-01-01 RX ADMIN — VANCOMYCIN HYDROCHLORIDE 500 MG: 250 CAPSULE ORAL at 06:07

## 2024-01-01 RX ADMIN — NYSTATIN: 100000 CREAM TOPICAL at 08:37

## 2024-01-01 RX ADMIN — NYSTATIN: 100000 POWDER TOPICAL at 20:48

## 2024-01-01 RX ADMIN — ALBUMIN (HUMAN) 25 G: 12.5 SOLUTION INTRAVENOUS at 15:45

## 2024-01-01 RX ADMIN — IPRATROPIUM BROMIDE AND ALBUTEROL SULFATE 3 ML: 2.5; .5 SOLUTION RESPIRATORY (INHALATION) at 07:55

## 2024-01-01 RX ADMIN — INSULIN LISPRO 2 UNITS: 100 INJECTION, SOLUTION INTRAVENOUS; SUBCUTANEOUS at 18:17

## 2024-01-01 RX ADMIN — SIMVASTATIN 20 MG: 20 TABLET, FILM COATED ORAL at 20:46

## 2024-01-01 RX ADMIN — NOREPINEPHRINE BITARTRATE 0.01 MCG/KG/MIN: 8 INJECTION, SOLUTION INTRAVENOUS at 08:07

## 2024-01-01 RX ADMIN — RUGBY ZINC OXIDE 20% 1 APPLICATION: 20 OINTMENT TOPICAL at 20:35

## 2024-01-01 RX ADMIN — APIXABAN 2.5 MG: 2.5 TABLET, FILM COATED ORAL at 09:28

## 2024-01-01 RX ADMIN — NYSTATIN: 100000 POWDER TOPICAL at 21:10

## 2024-01-01 RX ADMIN — SODIUM CHLORIDE SOLN NEBU 3% 3 ML: 3 NEBU SOLN at 07:21

## 2024-01-01 RX ADMIN — NOREPINEPHRINE BITARTRATE 0.06 MCG/KG/MIN: 8 INJECTION, SOLUTION INTRAVENOUS at 17:40

## 2024-01-01 RX ADMIN — ALBUMIN (HUMAN) 25 G: 12.5 SOLUTION INTRAVENOUS at 13:25

## 2024-01-01 RX ADMIN — FLUDROCORTISONE ACETATE 0.1 MG: 0.1 TABLET ORAL at 09:09

## 2024-01-01 RX ADMIN — HYDROCORTISONE SODIUM SUCCINATE 50 MG: 100 INJECTION, POWDER, FOR SOLUTION INTRAMUSCULAR; INTRAVENOUS at 21:03

## 2024-01-01 RX ADMIN — APIXABAN 2.5 MG: 2.5 TABLET, FILM COATED ORAL at 08:12

## 2024-01-01 RX ADMIN — NYSTATIN: 100000 CREAM TOPICAL at 20:27

## 2024-01-01 RX ADMIN — DOXYCYCLINE HYCLATE 100 MG: 100 CAPSULE ORAL at 12:54

## 2024-01-01 RX ADMIN — FLUDROCORTISONE ACETATE 0.1 MG: 0.1 TABLET ORAL at 09:02

## 2024-01-01 RX ADMIN — GABAPENTIN 100 MG: 100 CAPSULE ORAL at 14:48

## 2024-01-01 RX ADMIN — GABAPENTIN 100 MG: 100 CAPSULE ORAL at 20:29

## 2024-01-01 RX ADMIN — GABAPENTIN 100 MG: 100 CAPSULE ORAL at 20:59

## 2024-01-01 RX ADMIN — NYSTATIN: 100000 POWDER TOPICAL at 08:58

## 2024-01-01 RX ADMIN — HEPARIN SODIUM 1600 UNITS: 1000 INJECTION INTRAVENOUS; SUBCUTANEOUS at 18:45

## 2024-01-01 RX ADMIN — IPRATROPIUM BROMIDE AND ALBUTEROL SULFATE 3 ML: 2.5; .5 SOLUTION RESPIRATORY (INHALATION) at 11:27

## 2024-01-01 RX ADMIN — DOXYCYCLINE HYCLATE 100 MG: 100 CAPSULE ORAL at 12:49

## 2024-01-01 RX ADMIN — NYSTATIN: 100000 POWDER TOPICAL at 21:08

## 2024-01-01 RX ADMIN — SODIUM CHLORIDE SOLN NEBU 3% 3 ML: 3 NEBU SOLN at 19:01

## 2024-01-01 RX ADMIN — IPRATROPIUM BROMIDE AND ALBUTEROL SULFATE 3 ML: 2.5; .5 SOLUTION RESPIRATORY (INHALATION) at 08:19

## 2024-01-01 RX ADMIN — APIXABAN 2.5 MG: 2.5 TABLET, FILM COATED ORAL at 20:46

## 2024-01-01 RX ADMIN — MIDODRINE HYDROCHLORIDE 20 MG: 10 TABLET ORAL at 12:36

## 2024-01-01 RX ADMIN — RUGBY ZINC OXIDE 20% 1 APPLICATION: 20 OINTMENT TOPICAL at 09:00

## 2024-01-01 RX ADMIN — MIDODRINE HYDROCHLORIDE 20 MG: 10 TABLET ORAL at 18:16

## 2024-01-01 RX ADMIN — NYSTATIN: 100000 POWDER TOPICAL at 20:56

## 2024-01-01 RX ADMIN — GABAPENTIN 100 MG: 100 CAPSULE ORAL at 21:24

## 2024-01-01 RX ADMIN — RUGBY ZINC OXIDE 20% 1 APPLICATION: 20 OINTMENT TOPICAL at 10:00

## 2024-01-01 RX ADMIN — FLUDROCORTISONE ACETATE 0.1 MG: 0.1 TABLET ORAL at 08:46

## 2024-01-01 RX ADMIN — DOXYCYCLINE HYCLATE 100 MG: 100 CAPSULE ORAL at 12:18

## 2024-01-01 RX ADMIN — NOREPINEPHRINE BITARTRATE 0.28 MCG/KG/MIN: 8 INJECTION, SOLUTION INTRAVENOUS at 06:43

## 2024-01-01 RX ADMIN — NYSTATIN: 100000 CREAM TOPICAL at 08:29

## 2024-01-01 RX ADMIN — VANCOMYCIN HYDROCHLORIDE 125 MG: 125 CAPSULE ORAL at 21:11

## 2024-01-01 RX ADMIN — VANCOMYCIN HYDROCHLORIDE 125 MG: 125 CAPSULE ORAL at 06:44

## 2024-01-01 RX ADMIN — INSULIN LISPRO 4 UNITS: 100 INJECTION, SOLUTION INTRAVENOUS; SUBCUTANEOUS at 08:45

## 2024-01-01 RX ADMIN — IPRATROPIUM BROMIDE AND ALBUTEROL SULFATE 3 ML: 2.5; .5 SOLUTION RESPIRATORY (INHALATION) at 15:01

## 2024-01-01 RX ADMIN — INSULIN LISPRO 2 UNITS: 100 INJECTION, SOLUTION INTRAVENOUS; SUBCUTANEOUS at 17:59

## 2024-01-01 RX ADMIN — ALBUMIN (HUMAN) 12.5 G: 5 SOLUTION INTRAVENOUS at 11:14

## 2024-01-01 RX ADMIN — FLUDROCORTISONE ACETATE 0.1 MG: 0.1 TABLET ORAL at 15:35

## 2024-01-01 RX ADMIN — NYSTATIN: 100000 CREAM TOPICAL at 21:10

## 2024-01-01 RX ADMIN — IPRATROPIUM BROMIDE AND ALBUTEROL SULFATE 3 ML: 2.5; .5 SOLUTION RESPIRATORY (INHALATION) at 08:01

## 2024-01-01 RX ADMIN — ACETAMINOPHEN 1000 MG: 500 TABLET ORAL at 17:02

## 2024-01-01 RX ADMIN — ALBUMIN (HUMAN) 12.5 G: 0.25 INJECTION, SOLUTION INTRAVENOUS at 14:15

## 2024-01-01 RX ADMIN — NYSTATIN: 100000 POWDER TOPICAL at 21:29

## 2024-01-01 RX ADMIN — PANTOPRAZOLE SODIUM 40 MG: 40 TABLET, DELAYED RELEASE ORAL at 09:08

## 2024-01-01 RX ADMIN — GABAPENTIN 100 MG: 100 CAPSULE ORAL at 22:49

## 2024-01-01 RX ADMIN — GABAPENTIN 100 MG: 100 CAPSULE ORAL at 09:16

## 2024-01-01 RX ADMIN — INSULIN LISPRO 2 UNITS: 100 INJECTION, SOLUTION INTRAVENOUS; SUBCUTANEOUS at 12:19

## 2024-01-01 RX ADMIN — VANCOMYCIN HYDROCHLORIDE 125 MG: 125 CAPSULE ORAL at 21:27

## 2024-01-01 RX ADMIN — APIXABAN 2.5 MG: 2.5 TABLET, FILM COATED ORAL at 08:58

## 2024-01-01 RX ADMIN — SODIUM CHLORIDE SOLN NEBU 3% 3 ML: 3 NEBU SOLN at 11:39

## 2024-01-01 RX ADMIN — MIDODRINE HYDROCHLORIDE 20 MG: 10 TABLET ORAL at 12:42

## 2024-01-01 RX ADMIN — MIDODRINE HYDROCHLORIDE 15 MG: 5 TABLET ORAL at 08:23

## 2024-01-01 RX ADMIN — PIPERACILLIN SODIUM AND TAZOBACTAM SODIUM 2.25 G: 2; .25 INJECTION, SOLUTION INTRAVENOUS at 05:52

## 2024-01-01 RX ADMIN — IPRATROPIUM BROMIDE AND ALBUTEROL SULFATE 3 ML: 2.5; .5 SOLUTION RESPIRATORY (INHALATION) at 11:48

## 2024-01-01 RX ADMIN — INSULIN LISPRO 2 UNITS: 100 INJECTION, SOLUTION INTRAVENOUS; SUBCUTANEOUS at 10:04

## 2024-01-01 RX ADMIN — FLUDROCORTISONE ACETATE 0.1 MG: 0.1 TABLET ORAL at 16:34

## 2024-01-01 RX ADMIN — ACETAMINOPHEN 1000 MG: 500 TABLET ORAL at 15:31

## 2024-01-01 RX ADMIN — GABAPENTIN 100 MG: 100 CAPSULE ORAL at 08:12

## 2024-01-01 RX ADMIN — APIXABAN 2.5 MG: 2.5 TABLET, FILM COATED ORAL at 08:36

## 2024-01-01 RX ADMIN — INSULIN LISPRO 2 UNITS: 100 INJECTION, SOLUTION INTRAVENOUS; SUBCUTANEOUS at 12:09

## 2024-01-01 RX ADMIN — VANCOMYCIN HYDROCHLORIDE 125 MG: 125 CAPSULE ORAL at 20:14

## 2024-01-01 RX ADMIN — FLUDROCORTISONE ACETATE 0.1 MG: 0.1 TABLET ORAL at 21:19

## 2024-01-01 RX ADMIN — ACETAMINOPHEN 650 MG: 325 TABLET ORAL at 20:54

## 2024-01-01 RX ADMIN — SODIUM CHLORIDE SOLN NEBU 3% 3 ML: 3 NEBU SOLN at 11:23

## 2024-01-01 RX ADMIN — CEFEPIME 1 G: 1 INJECTION, POWDER, FOR SOLUTION INTRAMUSCULAR; INTRAVENOUS at 21:13

## 2024-01-01 RX ADMIN — MIDODRINE HYDROCHLORIDE 15 MG: 5 TABLET ORAL at 16:47

## 2024-01-01 RX ADMIN — INSULIN LISPRO 2 UNITS: 100 INJECTION, SOLUTION INTRAVENOUS; SUBCUTANEOUS at 08:32

## 2024-01-01 RX ADMIN — NYSTATIN: 100000 CREAM TOPICAL at 20:55

## 2024-01-01 RX ADMIN — PIPERACILLIN SODIUM AND TAZOBACTAM SODIUM 2.25 G: 2; .25 INJECTION, SOLUTION INTRAVENOUS at 12:57

## 2024-01-01 RX ADMIN — DOXYCYCLINE HYCLATE 100 MG: 100 CAPSULE ORAL at 11:15

## 2024-01-01 RX ADMIN — ESCITALOPRAM OXALATE 10 MG: 10 TABLET ORAL at 20:59

## 2024-01-01 RX ADMIN — DOXYCYCLINE HYCLATE 100 MG: 100 CAPSULE ORAL at 11:38

## 2024-01-01 RX ADMIN — EPOETIN ALFA-EPBX 10000 UNITS: 10000 INJECTION, SOLUTION INTRAVENOUS; SUBCUTANEOUS at 09:45

## 2024-01-01 RX ADMIN — DOXYCYCLINE HYCLATE 100 MG: 100 CAPSULE ORAL at 14:56

## 2024-01-01 RX ADMIN — NYSTATIN: 100000 CREAM TOPICAL at 09:42

## 2024-01-01 RX ADMIN — PANTOPRAZOLE SODIUM 40 MG: 40 TABLET, DELAYED RELEASE ORAL at 09:48

## 2024-01-01 RX ADMIN — IPRATROPIUM BROMIDE AND ALBUTEROL SULFATE 3 ML: 2.5; .5 SOLUTION RESPIRATORY (INHALATION) at 08:21

## 2024-01-01 RX ADMIN — NYSTATIN: 100000 CREAM TOPICAL at 21:26

## 2024-01-01 RX ADMIN — ACETAMINOPHEN 650 MG: 325 TABLET ORAL at 08:29

## 2024-01-01 RX ADMIN — HEPARIN SODIUM 2000 UNITS: 1000 INJECTION INTRAVENOUS; SUBCUTANEOUS at 13:45

## 2024-01-01 RX ADMIN — SODIUM CHLORIDE SOLN NEBU 3% 3 ML: 3 NEBU SOLN at 20:13

## 2024-01-01 RX ADMIN — INSULIN LISPRO 2 UNITS: 100 INJECTION, SOLUTION INTRAVENOUS; SUBCUTANEOUS at 17:26

## 2024-01-01 RX ADMIN — NYSTATIN 1 APPLICATION: 100000 POWDER TOPICAL at 19:05

## 2024-01-01 RX ADMIN — INSULIN LISPRO 2 UNITS: 100 INJECTION, SOLUTION INTRAVENOUS; SUBCUTANEOUS at 13:44

## 2024-01-01 RX ADMIN — GABAPENTIN 100 MG: 100 CAPSULE ORAL at 21:13

## 2024-01-01 RX ADMIN — FLUDROCORTISONE ACETATE 0.1 MG: 0.1 TABLET ORAL at 10:06

## 2024-01-01 RX ADMIN — VANCOMYCIN HYDROCHLORIDE 125 MG: 125 CAPSULE ORAL at 08:36

## 2024-01-01 RX ADMIN — NYSTATIN: 100000 CREAM TOPICAL at 08:21

## 2024-01-01 RX ADMIN — NOREPINEPHRINE BITARTRATE 0.09 MCG/KG/MIN: 8 INJECTION, SOLUTION INTRAVENOUS at 05:46

## 2024-01-01 RX ADMIN — SODIUM CHLORIDE SOLN NEBU 3% 3 ML: 3 NEBU SOLN at 12:05

## 2024-01-01 RX ADMIN — IPRATROPIUM BROMIDE AND ALBUTEROL SULFATE 3 ML: 2.5; .5 SOLUTION RESPIRATORY (INHALATION) at 19:57

## 2024-01-01 RX ADMIN — HEPARIN SODIUM 1600 UNITS: 1000 INJECTION INTRAVENOUS; SUBCUTANEOUS at 16:24

## 2024-01-01 RX ADMIN — MIDODRINE HYDROCHLORIDE 15 MG: 5 TABLET ORAL at 12:04

## 2024-01-01 RX ADMIN — FLUDROCORTISONE ACETATE 0.1 MG: 0.1 TABLET ORAL at 08:31

## 2024-01-01 RX ADMIN — NYSTATIN: 100000 CREAM TOPICAL at 09:31

## 2024-01-01 RX ADMIN — MIDODRINE HYDROCHLORIDE 15 MG: 5 TABLET ORAL at 16:11

## 2024-01-01 RX ADMIN — FLUDROCORTISONE ACETATE 0.1 MG: 0.1 TABLET ORAL at 08:36

## 2024-01-01 RX ADMIN — VANCOMYCIN HYDROCHLORIDE 125 MG: 125 CAPSULE ORAL at 16:19

## 2024-01-01 RX ADMIN — ACETYLCYSTEINE 600 MG: 200 INHALANT RESPIRATORY (INHALATION) at 20:45

## 2024-01-01 RX ADMIN — VASOPRESSIN 0.03 UNITS/MIN: 0.2 INJECTION INTRAVENOUS at 02:24

## 2024-01-01 RX ADMIN — PANTOPRAZOLE SODIUM 40 MG: 40 TABLET, DELAYED RELEASE ORAL at 08:26

## 2024-01-01 RX ADMIN — IPRATROPIUM BROMIDE AND ALBUTEROL SULFATE 3 ML: 2.5; .5 SOLUTION RESPIRATORY (INHALATION) at 19:42

## 2024-01-01 RX ADMIN — HEPARIN SODIUM 3200 UNITS: 1000 INJECTION INTRAVENOUS; SUBCUTANEOUS at 15:12

## 2024-01-01 RX ADMIN — RUGBY ZINC OXIDE 20% 1 APPLICATION: 20 OINTMENT TOPICAL at 10:51

## 2024-01-01 RX ADMIN — PIPERACILLIN SODIUM AND TAZOBACTAM SODIUM 2.25 G: 2; .25 INJECTION, SOLUTION INTRAVENOUS at 05:50

## 2024-01-01 RX ADMIN — DOXYCYCLINE HYCLATE 100 MG: 100 CAPSULE ORAL at 12:14

## 2024-01-01 RX ADMIN — NYSTATIN: 100000 CREAM TOPICAL at 22:20

## 2024-01-01 RX ADMIN — INSULIN LISPRO 4 UNITS: 100 INJECTION, SOLUTION INTRAVENOUS; SUBCUTANEOUS at 08:13

## 2024-01-01 RX ADMIN — MIDODRINE HYDROCHLORIDE 15 MG: 5 TABLET ORAL at 11:54

## 2024-01-01 RX ADMIN — APIXABAN 2.5 MG: 2.5 TABLET, FILM COATED ORAL at 20:31

## 2024-01-01 RX ADMIN — HEPARIN SODIUM 1600 UNITS: 1000 INJECTION INTRAVENOUS; SUBCUTANEOUS at 18:32

## 2024-01-01 RX ADMIN — NYSTATIN: 100000 CREAM TOPICAL at 21:29

## 2024-01-01 RX ADMIN — NYSTATIN: 100000 POWDER TOPICAL at 20:11

## 2024-01-01 RX ADMIN — ESCITALOPRAM OXALATE 10 MG: 10 TABLET ORAL at 21:24

## 2024-01-01 RX ADMIN — ESCITALOPRAM OXALATE 10 MG: 10 TABLET ORAL at 21:52

## 2024-01-01 RX ADMIN — GABAPENTIN 100 MG: 100 CAPSULE ORAL at 21:51

## 2024-01-01 RX ADMIN — MIDODRINE HYDROCHLORIDE 15 MG: 5 TABLET ORAL at 16:28

## 2024-01-01 RX ADMIN — IPRATROPIUM BROMIDE AND ALBUTEROL SULFATE 3 ML: 2.5; .5 SOLUTION RESPIRATORY (INHALATION) at 11:05

## 2024-01-01 RX ADMIN — RUGBY ZINC OXIDE 20% 1 APPLICATION: 20 OINTMENT TOPICAL at 10:35

## 2024-01-01 RX ADMIN — GABAPENTIN 100 MG: 100 CAPSULE ORAL at 17:04

## 2024-01-01 RX ADMIN — RUGBY ZINC OXIDE 20% 1 APPLICATION: 20 OINTMENT TOPICAL at 21:07

## 2024-01-01 RX ADMIN — NYSTATIN: 100000 CREAM TOPICAL at 11:29

## 2024-01-01 RX ADMIN — VANCOMYCIN 750 MG: 750 INJECTION, SOLUTION INTRAVENOUS at 18:14

## 2024-01-01 RX ADMIN — GABAPENTIN 100 MG: 100 CAPSULE ORAL at 14:56

## 2024-01-01 RX ADMIN — MIDODRINE HYDROCHLORIDE 15 MG: 5 TABLET ORAL at 16:39

## 2024-01-01 RX ADMIN — INSULIN LISPRO 4 UNITS: 100 INJECTION, SOLUTION INTRAVENOUS; SUBCUTANEOUS at 12:10

## 2024-01-01 RX ADMIN — GABAPENTIN 100 MG: 100 CAPSULE ORAL at 15:58

## 2024-01-01 RX ADMIN — ESCITALOPRAM OXALATE 10 MG: 10 TABLET ORAL at 22:49

## 2024-01-01 RX ADMIN — MIDODRINE HYDROCHLORIDE 15 MG: 5 TABLET ORAL at 16:19

## 2024-01-01 RX ADMIN — APIXABAN 2.5 MG: 2.5 TABLET, FILM COATED ORAL at 09:30

## 2024-01-01 RX ADMIN — VANCOMYCIN HYDROCHLORIDE 125 MG: 125 CAPSULE ORAL at 20:54

## 2024-01-01 RX ADMIN — IPRATROPIUM BROMIDE AND ALBUTEROL SULFATE 3 ML: 2.5; .5 SOLUTION RESPIRATORY (INHALATION) at 11:58

## 2024-01-01 RX ADMIN — DOXYCYCLINE HYCLATE 100 MG: 100 CAPSULE ORAL at 12:36

## 2024-01-01 RX ADMIN — APIXABAN 2.5 MG: 2.5 TABLET, FILM COATED ORAL at 20:59

## 2024-01-01 RX ADMIN — MIDODRINE HYDROCHLORIDE 20 MG: 10 TABLET ORAL at 12:59

## 2024-01-01 RX ADMIN — GABAPENTIN 100 MG: 100 CAPSULE ORAL at 21:38

## 2024-01-01 RX ADMIN — ESCITALOPRAM OXALATE 10 MG: 10 TABLET ORAL at 08:48

## 2024-01-01 RX ADMIN — HEPARIN SODIUM 1600 UNITS: 1000 INJECTION INTRAVENOUS; SUBCUTANEOUS at 18:20

## 2024-01-01 RX ADMIN — GABAPENTIN 100 MG: 100 CAPSULE ORAL at 20:47

## 2024-01-01 RX ADMIN — MIDODRINE HYDROCHLORIDE 15 MG: 5 TABLET ORAL at 14:10

## 2024-01-01 RX ADMIN — HYDROCORTISONE SODIUM SUCCINATE 50 MG: 100 INJECTION, POWDER, FOR SOLUTION INTRAMUSCULAR; INTRAVENOUS at 06:10

## 2024-01-01 RX ADMIN — FLUDROCORTISONE ACETATE 0.1 MG: 0.1 TABLET ORAL at 15:36

## 2024-01-01 RX ADMIN — VANCOMYCIN HYDROCHLORIDE 125 MG: 125 CAPSULE ORAL at 17:14

## 2024-01-01 RX ADMIN — ALBUMIN (HUMAN) 12.5 G: 5 SOLUTION INTRAVENOUS at 14:48

## 2024-01-01 RX ADMIN — FENOFIBRATE 160 MG: 160 TABLET ORAL at 08:48

## 2024-01-01 RX ADMIN — GABAPENTIN 100 MG: 100 CAPSULE ORAL at 21:07

## 2024-01-01 RX ADMIN — NYSTATIN: 100000 POWDER TOPICAL at 22:50

## 2024-01-01 RX ADMIN — ACETAMINOPHEN 650 MG: 325 TABLET ORAL at 20:35

## 2024-01-01 RX ADMIN — VANCOMYCIN HYDROCHLORIDE 125 MG: 125 CAPSULE ORAL at 20:10

## 2024-01-01 RX ADMIN — IPRATROPIUM BROMIDE AND ALBUTEROL SULFATE 3 ML: 2.5; .5 SOLUTION RESPIRATORY (INHALATION) at 11:30

## 2024-01-01 RX ADMIN — VANCOMYCIN HYDROCHLORIDE 125 MG: 125 CAPSULE ORAL at 20:21

## 2024-01-01 RX ADMIN — GABAPENTIN 100 MG: 100 CAPSULE ORAL at 09:30

## 2024-01-01 RX ADMIN — NOREPINEPHRINE BITARTRATE 0.04 MCG/KG/MIN: 8 INJECTION, SOLUTION INTRAVENOUS at 23:44

## 2024-01-01 RX ADMIN — RUGBY ZINC OXIDE 20% 1 APPLICATION: 20 OINTMENT TOPICAL at 20:22

## 2024-01-01 RX ADMIN — VASOPRESSIN 0.03 UNITS/MIN: 0.2 INJECTION INTRAVENOUS at 21:07

## 2024-01-01 RX ADMIN — NOREPINEPHRINE BITARTRATE 0.03 MCG/KG/MIN: 8 INJECTION, SOLUTION INTRAVENOUS at 13:03

## 2024-01-01 RX ADMIN — NYSTATIN: 100000 POWDER TOPICAL at 22:21

## 2024-01-01 RX ADMIN — PANTOPRAZOLE SODIUM 40 MG: 40 TABLET, DELAYED RELEASE ORAL at 10:22

## 2024-01-01 RX ADMIN — NYSTATIN: 100000 CREAM TOPICAL at 09:00

## 2024-01-01 RX ADMIN — ALBUMIN (HUMAN) 12.5 G: 5 SOLUTION INTRAVENOUS at 08:57

## 2024-01-01 RX ADMIN — APIXABAN 2.5 MG: 2.5 TABLET, FILM COATED ORAL at 08:23

## 2024-01-01 RX ADMIN — IPRATROPIUM BROMIDE AND ALBUTEROL SULFATE 3 ML: 2.5; .5 SOLUTION RESPIRATORY (INHALATION) at 11:36

## 2024-01-01 RX ADMIN — NOREPINEPHRINE BITARTRATE 0.29 MCG/KG/MIN: 8 INJECTION, SOLUTION INTRAVENOUS at 18:00

## 2024-01-01 RX ADMIN — SODIUM CHLORIDE SOLN NEBU 3% 3 ML: 3 NEBU SOLN at 20:43

## 2024-01-01 RX ADMIN — ACETAMINOPHEN 650 MG: 325 TABLET ORAL at 16:16

## 2024-01-01 RX ADMIN — APIXABAN 2.5 MG: 2.5 TABLET, FILM COATED ORAL at 21:10

## 2024-01-01 RX ADMIN — GABAPENTIN 100 MG: 100 CAPSULE ORAL at 09:22

## 2024-01-01 RX ADMIN — FLUDROCORTISONE ACETATE 0.1 MG: 0.1 TABLET ORAL at 20:19

## 2024-01-01 RX ADMIN — MIDODRINE HYDROCHLORIDE 20 MG: 10 TABLET ORAL at 17:46

## 2024-01-01 RX ADMIN — MIDODRINE HYDROCHLORIDE 15 MG: 5 TABLET ORAL at 12:41

## 2024-01-01 RX ADMIN — VANCOMYCIN HYDROCHLORIDE 500 MG: 250 CAPSULE ORAL at 08:48

## 2024-01-01 RX ADMIN — VANCOMYCIN HYDROCHLORIDE 125 MG: 125 CAPSULE ORAL at 21:07

## 2024-01-01 RX ADMIN — DOXYCYCLINE 100 MG: 100 INJECTION, POWDER, LYOPHILIZED, FOR SOLUTION INTRAVENOUS at 15:09

## 2024-01-01 RX ADMIN — PANTOPRAZOLE SODIUM 40 MG: 40 TABLET, DELAYED RELEASE ORAL at 10:02

## 2024-01-01 RX ADMIN — GABAPENTIN 100 MG: 100 CAPSULE ORAL at 08:36

## 2024-01-01 RX ADMIN — IPRATROPIUM BROMIDE AND ALBUTEROL SULFATE 3 ML: 2.5; .5 SOLUTION RESPIRATORY (INHALATION) at 19:53

## 2024-01-01 RX ADMIN — GABAPENTIN 100 MG: 100 CAPSULE ORAL at 21:33

## 2024-01-01 RX ADMIN — IPRATROPIUM BROMIDE AND ALBUTEROL SULFATE 3 ML: 2.5; .5 SOLUTION RESPIRATORY (INHALATION) at 22:02

## 2024-01-01 RX ADMIN — NOREPINEPHRINE BITARTRATE 0.11 MCG/KG/MIN: 8 INJECTION, SOLUTION INTRAVENOUS at 02:25

## 2024-01-01 RX ADMIN — GABAPENTIN 100 MG: 100 CAPSULE ORAL at 10:02

## 2024-01-01 RX ADMIN — FENOFIBRATE 160 MG: 160 TABLET ORAL at 21:28

## 2024-01-01 RX ADMIN — RUGBY ZINC OXIDE 20% 1 APPLICATION: 20 OINTMENT TOPICAL at 09:07

## 2024-01-01 RX ADMIN — ACETAMINOPHEN 650 MG: 325 TABLET ORAL at 08:57

## 2024-01-01 RX ADMIN — HEPARIN SODIUM 1600 UNITS: 1000 INJECTION INTRAVENOUS; SUBCUTANEOUS at 14:54

## 2024-01-01 RX ADMIN — NYSTATIN: 100000 POWDER TOPICAL at 08:10

## 2024-01-01 RX ADMIN — VANCOMYCIN HYDROCHLORIDE 500 MG: 250 CAPSULE ORAL at 19:58

## 2024-01-01 RX ADMIN — MIDODRINE HYDROCHLORIDE 15 MG: 5 TABLET ORAL at 13:53

## 2024-01-01 RX ADMIN — NYSTATIN: 100000 POWDER TOPICAL at 09:55

## 2024-01-01 RX ADMIN — Medication 400 MG: at 21:07

## 2024-01-01 RX ADMIN — CEFEPIME 1 G: 1 INJECTION, POWDER, FOR SOLUTION INTRAMUSCULAR; INTRAVENOUS at 21:18

## 2024-01-01 RX ADMIN — APIXABAN 2.5 MG: 2.5 TABLET, FILM COATED ORAL at 20:12

## 2024-01-01 RX ADMIN — INSULIN LISPRO 2 UNITS: 100 INJECTION, SOLUTION INTRAVENOUS; SUBCUTANEOUS at 08:18

## 2024-01-01 RX ADMIN — IPRATROPIUM BROMIDE AND ALBUTEROL SULFATE 3 ML: 2.5; .5 SOLUTION RESPIRATORY (INHALATION) at 20:59

## 2024-01-01 RX ADMIN — APIXABAN 2.5 MG: 2.5 TABLET, FILM COATED ORAL at 20:10

## 2024-01-01 RX ADMIN — GABAPENTIN 100 MG: 100 CAPSULE ORAL at 21:11

## 2024-01-01 RX ADMIN — NOREPINEPHRINE BITARTRATE 0.24 MCG/KG/MIN: 8 INJECTION, SOLUTION INTRAVENOUS at 17:51

## 2024-01-01 RX ADMIN — MIDODRINE HYDROCHLORIDE 20 MG: 10 TABLET ORAL at 12:54

## 2024-01-01 RX ADMIN — FLUDROCORTISONE ACETATE 0.1 MG: 0.1 TABLET ORAL at 15:32

## 2024-01-01 RX ADMIN — APIXABAN 2.5 MG: 2.5 TABLET, FILM COATED ORAL at 20:50

## 2024-01-01 RX ADMIN — NYSTATIN: 100000 POWDER TOPICAL at 11:54

## 2024-01-01 RX ADMIN — NYSTATIN: 100000 POWDER TOPICAL at 09:30

## 2024-01-01 RX ADMIN — NYSTATIN 1 APPLICATION: 100000 CREAM TOPICAL at 09:56

## 2024-01-01 RX ADMIN — NYSTATIN: 100000 POWDER TOPICAL at 20:47

## 2024-01-01 RX ADMIN — GABAPENTIN 100 MG: 100 CAPSULE ORAL at 20:27

## 2024-01-01 RX ADMIN — MIDODRINE HYDROCHLORIDE 15 MG: 5 TABLET ORAL at 08:15

## 2024-01-01 RX ADMIN — NYSTATIN: 100000 CREAM TOPICAL at 08:12

## 2024-01-01 RX ADMIN — HEPARIN SODIUM 1600 UNITS: 1000 INJECTION INTRAVENOUS; SUBCUTANEOUS at 14:42

## 2024-01-01 RX ADMIN — NYSTATIN: 100000 CREAM TOPICAL at 21:25

## 2024-01-01 RX ADMIN — MAGNESIUM SULFATE HEPTAHYDRATE 2 G: 40 INJECTION, SOLUTION INTRAVENOUS at 11:34

## 2024-01-01 RX ADMIN — VANCOMYCIN 1750 MG: 1.25 INJECTION, SOLUTION INTRAVENOUS at 23:17

## 2024-01-01 RX ADMIN — NOREPINEPHRINE BITARTRATE 0.06 MCG/KG/MIN: 8 INJECTION, SOLUTION INTRAVENOUS at 02:29

## 2024-01-01 RX ADMIN — HEPARIN SODIUM 1600 UNITS: 1000 INJECTION INTRAVENOUS; SUBCUTANEOUS at 14:04

## 2024-01-01 RX ADMIN — SODIUM CHLORIDE SOLN NEBU 3% 3 ML: 3 NEBU SOLN at 07:15

## 2024-01-01 RX ADMIN — INSULIN LISPRO 6 UNITS: 100 INJECTION, SOLUTION INTRAVENOUS; SUBCUTANEOUS at 17:12

## 2024-01-01 RX ADMIN — Medication 1 APPLICATION: at 11:02

## 2024-01-01 RX ADMIN — FLUDROCORTISONE ACETATE 0.1 MG: 0.1 TABLET ORAL at 14:25

## 2024-01-01 RX ADMIN — SODIUM CHLORIDE SOLN NEBU 3% 3 ML: 3 NEBU SOLN at 19:34

## 2024-01-01 RX ADMIN — APIXABAN 2.5 MG: 2.5 TABLET, FILM COATED ORAL at 08:57

## 2024-01-01 RX ADMIN — INSULIN LISPRO 2 UNITS: 100 INJECTION, SOLUTION INTRAVENOUS; SUBCUTANEOUS at 12:08

## 2024-01-01 RX ADMIN — VANCOMYCIN HYDROCHLORIDE 125 MG: 125 CAPSULE ORAL at 09:09

## 2024-01-01 RX ADMIN — Medication 500 MG: at 09:09

## 2024-01-01 RX ADMIN — GABAPENTIN 100 MG: 100 CAPSULE ORAL at 08:24

## 2024-01-01 RX ADMIN — MIDODRINE HYDROCHLORIDE 15 MG: 5 TABLET ORAL at 12:38

## 2024-01-01 RX ADMIN — APIXABAN 2.5 MG: 2.5 TABLET, FILM COATED ORAL at 21:46

## 2024-01-01 RX ADMIN — NYSTATIN 1 APPLICATION: 100000 POWDER TOPICAL at 09:44

## 2024-01-01 RX ADMIN — IPRATROPIUM BROMIDE AND ALBUTEROL SULFATE 3 ML: 2.5; .5 SOLUTION RESPIRATORY (INHALATION) at 12:40

## 2024-01-01 RX ADMIN — GABAPENTIN 100 MG: 100 CAPSULE ORAL at 17:11

## 2024-01-01 RX ADMIN — MIDODRINE HYDROCHLORIDE 15 MG: 5 TABLET ORAL at 12:02

## 2024-01-01 RX ADMIN — INSULIN LISPRO 2 UNITS: 100 INJECTION, SOLUTION INTRAVENOUS; SUBCUTANEOUS at 12:56

## 2024-01-01 RX ADMIN — NYSTATIN: 100000 CREAM TOPICAL at 20:53

## 2024-01-01 RX ADMIN — FLUDROCORTISONE ACETATE 0.1 MG: 0.1 TABLET ORAL at 08:17

## 2024-01-01 RX ADMIN — PSYLLIUM HUSK 1 PACKET: 3.4 POWDER ORAL at 08:25

## 2024-01-01 RX ADMIN — INSULIN LISPRO 4 UNITS: 100 INJECTION, SOLUTION INTRAVENOUS; SUBCUTANEOUS at 12:45

## 2024-01-01 RX ADMIN — MIDODRINE HYDROCHLORIDE 15 MG: 5 TABLET ORAL at 08:52

## 2024-01-01 RX ADMIN — ACETAMINOPHEN 650 MG: 325 TABLET ORAL at 21:10

## 2024-01-01 RX ADMIN — IPRATROPIUM BROMIDE AND ALBUTEROL SULFATE 3 ML: 2.5; .5 SOLUTION RESPIRATORY (INHALATION) at 15:39

## 2024-01-01 RX ADMIN — RUGBY ZINC OXIDE 20% 1 APPLICATION: 20 OINTMENT TOPICAL at 09:56

## 2024-01-01 RX ADMIN — MIDODRINE HYDROCHLORIDE 20 MG: 10 TABLET ORAL at 16:00

## 2024-01-01 RX ADMIN — APIXABAN 2.5 MG: 2.5 TABLET, FILM COATED ORAL at 08:29

## 2024-01-01 RX ADMIN — SODIUM CHLORIDE SOLN NEBU 3% 3 ML: 3 NEBU SOLN at 19:31

## 2024-01-01 RX ADMIN — NYSTATIN: 100000 POWDER TOPICAL at 10:30

## 2024-01-01 RX ADMIN — NYSTATIN: 100000 POWDER TOPICAL at 09:00

## 2024-01-01 RX ADMIN — PIPERACILLIN SODIUM AND TAZOBACTAM SODIUM 2.25 G: 2; .25 INJECTION, SOLUTION INTRAVENOUS at 16:24

## 2024-01-01 RX ADMIN — SODIUM CHLORIDE 500 ML: 900 INJECTION, SOLUTION INTRAVENOUS at 00:46

## 2024-01-01 RX ADMIN — NOREPINEPHRINE BITARTRATE 0.38 MCG/KG/MIN: 8 INJECTION, SOLUTION INTRAVENOUS at 09:59

## 2024-01-01 RX ADMIN — PANTOPRAZOLE SODIUM 40 MG: 40 TABLET, DELAYED RELEASE ORAL at 09:55

## 2024-01-01 RX ADMIN — ALBUMIN (HUMAN) 12.5 G: 5 SOLUTION INTRAVENOUS at 11:34

## 2024-01-01 RX ADMIN — GABAPENTIN 100 MG: 100 CAPSULE ORAL at 08:29

## 2024-01-01 RX ADMIN — NYSTATIN: 100000 POWDER TOPICAL at 21:40

## 2024-01-01 RX ADMIN — GABAPENTIN 100 MG: 100 CAPSULE ORAL at 16:29

## 2024-01-01 RX ADMIN — ACETAMINOPHEN 650 MG: 325 TABLET ORAL at 20:47

## 2024-01-01 RX ADMIN — Medication 1 APPLICATION: at 22:30

## 2024-01-01 RX ADMIN — VANCOMYCIN HYDROCHLORIDE 500 MG: 250 CAPSULE ORAL at 13:53

## 2024-01-01 RX ADMIN — IPRATROPIUM BROMIDE AND ALBUTEROL SULFATE 3 ML: 2.5; .5 SOLUTION RESPIRATORY (INHALATION) at 19:28

## 2024-01-01 RX ADMIN — ESCITALOPRAM OXALATE 10 MG: 10 TABLET ORAL at 20:27

## 2024-01-01 RX ADMIN — INSULIN LISPRO 4 UNITS: 100 INJECTION, SOLUTION INTRAVENOUS; SUBCUTANEOUS at 17:31

## 2024-01-01 RX ADMIN — Medication 3 L/MIN: at 08:24

## 2024-01-01 RX ADMIN — ESCITALOPRAM OXALATE 10 MG: 10 TABLET ORAL at 20:31

## 2024-01-01 RX ADMIN — GABAPENTIN 100 MG: 100 CAPSULE ORAL at 15:55

## 2024-01-01 RX ADMIN — APIXABAN 2.5 MG: 2.5 TABLET, FILM COATED ORAL at 08:16

## 2024-01-01 RX ADMIN — MIDODRINE HYDROCHLORIDE 15 MG: 5 TABLET ORAL at 16:57

## 2024-01-01 RX ADMIN — MIDODRINE HYDROCHLORIDE 15 MG: 5 TABLET ORAL at 12:10

## 2024-01-01 RX ADMIN — NYSTATIN: 100000 POWDER TOPICAL at 21:28

## 2024-01-01 RX ADMIN — NYSTATIN: 100000 POWDER TOPICAL at 08:30

## 2024-01-01 RX ADMIN — INSULIN LISPRO 4 UNITS: 100 INJECTION, SOLUTION INTRAVENOUS; SUBCUTANEOUS at 12:56

## 2024-01-01 RX ADMIN — NOREPINEPHRINE BITARTRATE 0.05 MCG/KG/MIN: 8 INJECTION, SOLUTION INTRAVENOUS at 01:46

## 2024-01-01 RX ADMIN — NYSTATIN: 100000 POWDER TOPICAL at 20:32

## 2024-01-01 RX ADMIN — MIDODRINE HYDROCHLORIDE 20 MG: 10 TABLET ORAL at 17:40

## 2024-01-01 RX ADMIN — APIXABAN 2.5 MG: 2.5 TABLET, FILM COATED ORAL at 09:55

## 2024-01-01 RX ADMIN — COLLAGENASE SANTYL: 250 OINTMENT TOPICAL at 09:44

## 2024-01-01 RX ADMIN — ESCITALOPRAM OXALATE 10 MG: 10 TABLET ORAL at 20:54

## 2024-01-01 RX ADMIN — IPRATROPIUM BROMIDE AND ALBUTEROL SULFATE 3 ML: 2.5; .5 SOLUTION RESPIRATORY (INHALATION) at 20:43

## 2024-01-01 RX ADMIN — MIDODRINE HYDROCHLORIDE 20 MG: 10 TABLET ORAL at 18:00

## 2024-01-01 RX ADMIN — SODIUM CHLORIDE SOLN NEBU 3% 3 ML: 3 NEBU SOLN at 08:12

## 2024-01-01 RX ADMIN — NOREPINEPHRINE BITARTRATE 0.33 MCG/KG/MIN: 8 INJECTION, SOLUTION INTRAVENOUS at 16:17

## 2024-01-01 RX ADMIN — SODIUM CHLORIDE SOLN NEBU 3% 3 ML: 3 NEBU SOLN at 19:36

## 2024-01-01 RX ADMIN — NYSTATIN: 100000 POWDER TOPICAL at 22:00

## 2024-01-01 RX ADMIN — ESCITALOPRAM OXALATE 10 MG: 10 TABLET ORAL at 08:23

## 2024-01-01 RX ADMIN — MIDODRINE HYDROCHLORIDE 15 MG: 5 TABLET ORAL at 08:12

## 2024-01-01 RX ADMIN — GABAPENTIN 100 MG: 100 CAPSULE ORAL at 14:05

## 2024-01-01 RX ADMIN — APIXABAN 2.5 MG: 2.5 TABLET, FILM COATED ORAL at 10:24

## 2024-01-01 RX ADMIN — NYSTATIN: 100000 CREAM TOPICAL at 20:32

## 2024-01-01 RX ADMIN — GABAPENTIN 100 MG: 100 CAPSULE ORAL at 20:12

## 2024-01-01 RX ADMIN — FLUDROCORTISONE ACETATE 0.1 MG: 0.1 TABLET ORAL at 18:14

## 2024-01-01 RX ADMIN — MIDODRINE HYDROCHLORIDE 15 MG: 5 TABLET ORAL at 08:46

## 2024-01-01 RX ADMIN — GABAPENTIN 100 MG: 100 CAPSULE ORAL at 15:11

## 2024-01-01 RX ADMIN — GABAPENTIN 100 MG: 100 CAPSULE ORAL at 21:10

## 2024-01-01 RX ADMIN — PANTOPRAZOLE SODIUM 40 MG: 40 INJECTION, POWDER, FOR SOLUTION INTRAVENOUS at 08:59

## 2024-01-01 RX ADMIN — INSULIN LISPRO 6 UNITS: 100 INJECTION, SOLUTION INTRAVENOUS; SUBCUTANEOUS at 08:59

## 2024-01-01 RX ADMIN — INSULIN LISPRO 2 UNITS: 100 INJECTION, SOLUTION INTRAVENOUS; SUBCUTANEOUS at 13:55

## 2024-01-01 RX ADMIN — APIXABAN 2.5 MG: 2.5 TABLET, FILM COATED ORAL at 20:48

## 2024-01-01 RX ADMIN — HEPARIN SODIUM 2000 UNITS: 1000 INJECTION INTRAVENOUS; SUBCUTANEOUS at 16:16

## 2024-01-01 RX ADMIN — VANCOMYCIN HYDROCHLORIDE 125 MG: 125 CAPSULE ORAL at 12:21

## 2024-01-01 RX ADMIN — EPOETIN ALFA-EPBX 10000 UNITS: 10000 INJECTION, SOLUTION INTRAVENOUS; SUBCUTANEOUS at 09:30

## 2024-01-01 RX ADMIN — FLUDROCORTISONE ACETATE 0.1 MG: 0.1 TABLET ORAL at 21:52

## 2024-01-01 RX ADMIN — FLUDROCORTISONE ACETATE 0.1 MG: 0.1 TABLET ORAL at 09:22

## 2024-01-01 RX ADMIN — NYSTATIN: 100000 POWDER TOPICAL at 08:23

## 2024-01-01 RX ADMIN — NOREPINEPHRINE BITARTRATE 0.09 MCG/KG/MIN: 8 INJECTION, SOLUTION INTRAVENOUS at 21:38

## 2024-01-01 RX ADMIN — MIDODRINE HYDROCHLORIDE 15 MG: 5 TABLET ORAL at 08:29

## 2024-01-01 RX ADMIN — RUGBY ZINC OXIDE 20% 1 APPLICATION: 20 OINTMENT TOPICAL at 21:29

## 2024-01-01 RX ADMIN — GABAPENTIN 100 MG: 100 CAPSULE ORAL at 08:16

## 2024-01-01 RX ADMIN — APIXABAN 2.5 MG: 2.5 TABLET, FILM COATED ORAL at 21:13

## 2024-01-01 RX ADMIN — ACETAMINOPHEN 1000 MG: 500 TABLET ORAL at 21:13

## 2024-01-01 RX ADMIN — IPRATROPIUM BROMIDE AND ALBUTEROL SULFATE 3 ML: 2.5; .5 SOLUTION RESPIRATORY (INHALATION) at 11:45

## 2024-01-01 RX ADMIN — FLUDROCORTISONE ACETATE 0.1 MG: 0.1 TABLET ORAL at 21:24

## 2024-01-01 RX ADMIN — RUGBY ZINC OXIDE 20% 1 APPLICATION: 20 OINTMENT TOPICAL at 09:49

## 2024-01-01 RX ADMIN — FLUDROCORTISONE ACETATE 0.1 MG: 0.1 TABLET ORAL at 15:58

## 2024-01-01 RX ADMIN — SODIUM CHLORIDE SOLN NEBU 3% 3 ML: 3 NEBU SOLN at 00:01

## 2024-01-01 RX ADMIN — NYSTATIN 1 APPLICATION: 100000 POWDER TOPICAL at 08:25

## 2024-01-01 RX ADMIN — PIPERACILLIN SODIUM AND TAZOBACTAM SODIUM 2.25 G: 2; .25 INJECTION, SOLUTION INTRAVENOUS at 21:07

## 2024-01-01 RX ADMIN — IPRATROPIUM BROMIDE AND ALBUTEROL SULFATE 3 ML: 2.5; .5 SOLUTION RESPIRATORY (INHALATION) at 20:36

## 2024-01-01 RX ADMIN — HYDROCORTISONE SODIUM SUCCINATE 50 MG: 100 INJECTION, POWDER, FOR SOLUTION INTRAMUSCULAR; INTRAVENOUS at 06:07

## 2024-01-01 RX ADMIN — RUGBY ZINC OXIDE 20% 1 APPLICATION: 20 OINTMENT TOPICAL at 09:42

## 2024-01-01 RX ADMIN — MIDODRINE HYDROCHLORIDE 20 MG: 10 TABLET ORAL at 12:16

## 2024-01-01 RX ADMIN — RUGBY ZINC OXIDE 20% 1 APPLICATION: 20 OINTMENT TOPICAL at 08:27

## 2024-01-01 RX ADMIN — NYSTATIN: 100000 POWDER TOPICAL at 21:22

## 2024-01-01 RX ADMIN — FLUDROCORTISONE ACETATE 0.1 MG: 0.1 TABLET ORAL at 19:00

## 2024-01-01 RX ADMIN — NYSTATIN: 100000 CREAM TOPICAL at 20:46

## 2024-01-01 RX ADMIN — HEPARIN SODIUM 2000 UNITS: 1000 INJECTION INTRAVENOUS; SUBCUTANEOUS at 09:59

## 2024-01-01 RX ADMIN — RUGBY ZINC OXIDE 20% 1 APPLICATION: 20 OINTMENT TOPICAL at 09:32

## 2024-01-01 RX ADMIN — ACETAMINOPHEN 1000 MG: 160 SOLUTION ORAL at 11:12

## 2024-01-01 RX ADMIN — APIXABAN 2.5 MG: 2.5 TABLET, FILM COATED ORAL at 21:02

## 2024-01-01 RX ADMIN — NYSTATIN: 100000 CREAM TOPICAL at 20:16

## 2024-01-01 RX ADMIN — Medication: at 19:28

## 2024-01-01 RX ADMIN — ESCITALOPRAM OXALATE 10 MG: 10 TABLET ORAL at 21:44

## 2024-01-01 RX ADMIN — SODIUM CHLORIDE SOLN NEBU 3% 3 ML: 3 NEBU SOLN at 07:31

## 2024-01-01 RX ADMIN — MIDODRINE HYDROCHLORIDE 15 MG: 5 TABLET ORAL at 17:12

## 2024-01-01 RX ADMIN — DOXYCYCLINE HYCLATE 100 MG: 100 CAPSULE ORAL at 08:23

## 2024-01-01 RX ADMIN — POTASSIUM & SODIUM PHOSPHATES POWDER PACK 280-160-250 MG 1 PACKET: 280-160-250 PACK at 08:42

## 2024-01-01 RX ADMIN — APIXABAN 2.5 MG: 2.5 TABLET, FILM COATED ORAL at 21:07

## 2024-01-01 RX ADMIN — NYSTATIN: 100000 CREAM TOPICAL at 20:47

## 2024-01-01 RX ADMIN — APIXABAN 2.5 MG: 2.5 TABLET, FILM COATED ORAL at 21:33

## 2024-01-01 RX ADMIN — GABAPENTIN 100 MG: 100 CAPSULE ORAL at 08:56

## 2024-01-01 RX ADMIN — MAGNESIUM SULFATE HEPTAHYDRATE 2 G: 40 INJECTION, SOLUTION INTRAVENOUS at 10:26

## 2024-01-01 RX ADMIN — RUGBY ZINC OXIDE 20% 1 APPLICATION: 20 OINTMENT TOPICAL at 21:45

## 2024-01-01 RX ADMIN — CEFEPIME 1 G: 1 INJECTION, POWDER, FOR SOLUTION INTRAMUSCULAR; INTRAVENOUS at 21:07

## 2024-01-01 RX ADMIN — PANTOPRAZOLE SODIUM 40 MG: 40 TABLET, DELAYED RELEASE ORAL at 09:35

## 2024-01-01 RX ADMIN — FENOFIBRATE 160 MG: 160 TABLET ORAL at 09:00

## 2024-01-01 RX ADMIN — RUGBY ZINC OXIDE 20% 1 APPLICATION: 20 OINTMENT TOPICAL at 21:14

## 2024-01-01 RX ADMIN — NOREPINEPHRINE BITARTRATE 0.23 MCG/KG/MIN: 8 INJECTION, SOLUTION INTRAVENOUS at 13:09

## 2024-01-01 RX ADMIN — INSULIN LISPRO 4 UNITS: 100 INJECTION, SOLUTION INTRAVENOUS; SUBCUTANEOUS at 08:12

## 2024-01-01 RX ADMIN — MIDODRINE HYDROCHLORIDE 15 MG: 5 TABLET ORAL at 16:23

## 2024-01-01 RX ADMIN — NOREPINEPHRINE BITARTRATE 0.1 MCG/KG/MIN: 8 INJECTION, SOLUTION INTRAVENOUS at 01:55

## 2024-01-01 RX ADMIN — FLUDROCORTISONE ACETATE 0.1 MG: 0.1 TABLET ORAL at 15:13

## 2024-01-01 RX ADMIN — NYSTATIN 1 APPLICATION: 100000 CREAM TOPICAL at 09:30

## 2024-01-01 RX ADMIN — VANCOMYCIN HYDROCHLORIDE 500 MG: 250 CAPSULE ORAL at 20:27

## 2024-01-01 RX ADMIN — VANCOMYCIN HYDROCHLORIDE 125 MG: 125 CAPSULE ORAL at 21:24

## 2024-01-01 RX ADMIN — SODIUM CHLORIDE SOLN NEBU 3% 3 ML: 3 NEBU SOLN at 12:27

## 2024-01-01 RX ADMIN — NYSTATIN: 100000 POWDER TOPICAL at 09:47

## 2024-01-01 RX ADMIN — PSYLLIUM HUSK 1 PACKET: 3.4 POWDER ORAL at 09:35

## 2024-01-01 RX ADMIN — SODIUM CHLORIDE SOLN NEBU 3% 3 ML: 3 NEBU SOLN at 08:01

## 2024-01-01 RX ADMIN — NOREPINEPHRINE BITARTRATE 0.08 MCG/KG/MIN: 8 INJECTION, SOLUTION INTRAVENOUS at 17:46

## 2024-01-01 RX ADMIN — INSULIN LISPRO 2 UNITS: 100 INJECTION, SOLUTION INTRAVENOUS; SUBCUTANEOUS at 12:41

## 2024-01-01 RX ADMIN — APIXABAN 2.5 MG: 2.5 TABLET, FILM COATED ORAL at 10:02

## 2024-01-01 RX ADMIN — GABAPENTIN 100 MG: 100 CAPSULE ORAL at 16:32

## 2024-01-01 RX ADMIN — ACETAMINOPHEN 1000 MG: 500 TABLET ORAL at 16:37

## 2024-01-01 RX ADMIN — VANCOMYCIN HYDROCHLORIDE 125 MG: 125 CAPSULE ORAL at 09:59

## 2024-01-01 RX ADMIN — INSULIN LISPRO 2 UNITS: 100 INJECTION, SOLUTION INTRAVENOUS; SUBCUTANEOUS at 13:00

## 2024-01-01 RX ADMIN — IPRATROPIUM BROMIDE AND ALBUTEROL SULFATE 3 ML: 2.5; .5 SOLUTION RESPIRATORY (INHALATION) at 15:29

## 2024-01-01 RX ADMIN — GABAPENTIN 100 MG: 100 CAPSULE ORAL at 20:06

## 2024-01-01 RX ADMIN — DOXYCYCLINE HYCLATE 100 MG: 100 CAPSULE ORAL at 11:40

## 2024-01-01 RX ADMIN — MIDODRINE HYDROCHLORIDE 15 MG: 5 TABLET ORAL at 17:09

## 2024-01-01 RX ADMIN — IPRATROPIUM BROMIDE AND ALBUTEROL SULFATE 3 ML: 2.5; .5 SOLUTION RESPIRATORY (INHALATION) at 06:49

## 2024-01-01 RX ADMIN — RUGBY ZINC OXIDE 20% 1 APPLICATION: 20 OINTMENT TOPICAL at 20:30

## 2024-01-01 RX ADMIN — PANTOPRAZOLE SODIUM 40 MG: 40 TABLET, DELAYED RELEASE ORAL at 08:27

## 2024-01-01 RX ADMIN — ALBUMIN (HUMAN) 12.5 G: 0.25 INJECTION, SOLUTION INTRAVENOUS at 12:47

## 2024-01-01 RX ADMIN — GABAPENTIN 100 MG: 100 CAPSULE ORAL at 20:13

## 2024-01-01 RX ADMIN — APIXABAN 2.5 MG: 2.5 TABLET, FILM COATED ORAL at 21:30

## 2024-01-01 RX ADMIN — RUGBY ZINC OXIDE 20% 1 APPLICATION: 20 OINTMENT TOPICAL at 20:19

## 2024-01-01 RX ADMIN — VANCOMYCIN HYDROCHLORIDE 125 MG: 125 CAPSULE ORAL at 08:39

## 2024-01-01 RX ADMIN — VANCOMYCIN HYDROCHLORIDE 500 MG: 250 CAPSULE ORAL at 18:27

## 2024-01-01 RX ADMIN — NYSTATIN: 100000 CREAM TOPICAL at 22:50

## 2024-01-01 RX ADMIN — NOREPINEPHRINE BITARTRATE 0.35 MCG/KG/MIN: 8 INJECTION, SOLUTION INTRAVENOUS at 21:04

## 2024-01-01 RX ADMIN — MIDODRINE HYDROCHLORIDE 20 MG: 10 TABLET ORAL at 11:15

## 2024-01-01 RX ADMIN — VANCOMYCIN HYDROCHLORIDE 125 MG: 125 CAPSULE ORAL at 21:38

## 2024-01-01 RX ADMIN — MIDODRINE HYDROCHLORIDE 15 MG: 5 TABLET ORAL at 12:18

## 2024-01-01 RX ADMIN — NYSTATIN: 100000 POWDER TOPICAL at 09:07

## 2024-01-01 RX ADMIN — MIDODRINE HYDROCHLORIDE 30 MG: 10 TABLET ORAL at 08:41

## 2024-01-01 RX ADMIN — BUDESONIDE INHALATION 0.25 MG: 0.5 SUSPENSION RESPIRATORY (INHALATION) at 07:35

## 2024-01-01 RX ADMIN — NYSTATIN 1 APPLICATION: 100000 POWDER TOPICAL at 20:47

## 2024-01-01 RX ADMIN — MIDODRINE HYDROCHLORIDE 15 MG: 5 TABLET ORAL at 08:31

## 2024-01-01 RX ADMIN — NYSTATIN 1 APPLICATION: 100000 CREAM TOPICAL at 21:19

## 2024-01-01 RX ADMIN — ALBUMIN (HUMAN) 12.5 G: 12.5 SOLUTION INTRAVENOUS at 16:13

## 2024-01-01 RX ADMIN — VANCOMYCIN HYDROCHLORIDE 125 MG: 125 CAPSULE ORAL at 20:55

## 2024-01-01 RX ADMIN — ACETAMINOPHEN 650 MG: 325 TABLET ORAL at 08:15

## 2024-01-01 RX ADMIN — VANCOMYCIN HYDROCHLORIDE 125 MG: 125 CAPSULE ORAL at 20:29

## 2024-01-01 RX ADMIN — VANCOMYCIN HYDROCHLORIDE 125 MG: 125 CAPSULE ORAL at 08:52

## 2024-01-01 RX ADMIN — IPRATROPIUM BROMIDE AND ALBUTEROL SULFATE 3 ML: 2.5; .5 SOLUTION RESPIRATORY (INHALATION) at 07:16

## 2024-01-01 RX ADMIN — SODIUM CHLORIDE SOLN NEBU 3% 3 ML: 3 NEBU SOLN at 07:23

## 2024-01-01 RX ADMIN — POTASSIUM & SODIUM PHOSPHATES POWDER PACK 280-160-250 MG 1 PACKET: 280-160-250 PACK at 13:45

## 2024-01-01 RX ADMIN — ESCITALOPRAM OXALATE 10 MG: 10 TABLET ORAL at 20:46

## 2024-01-01 RX ADMIN — MAGNESIUM SULFATE HEPTAHYDRATE 2 G: 40 INJECTION, SOLUTION INTRAVENOUS at 14:32

## 2024-01-01 RX ADMIN — NYSTATIN: 100000 CREAM TOPICAL at 09:07

## 2024-01-01 RX ADMIN — FLUDROCORTISONE ACETATE 0.1 MG: 0.1 TABLET ORAL at 15:11

## 2024-01-01 RX ADMIN — MIDODRINE HYDROCHLORIDE 30 MG: 10 TABLET ORAL at 12:50

## 2024-01-01 RX ADMIN — IPRATROPIUM BROMIDE AND ALBUTEROL SULFATE 3 ML: 2.5; .5 SOLUTION RESPIRATORY (INHALATION) at 07:15

## 2024-01-01 RX ADMIN — FLUDROCORTISONE ACETATE 0.1 MG: 0.1 TABLET ORAL at 13:44

## 2024-01-01 RX ADMIN — NYSTATIN: 100000 POWDER TOPICAL at 08:25

## 2024-01-01 RX ADMIN — EPOETIN ALFA-EPBX 10000 UNITS: 10000 INJECTION, SOLUTION INTRAVENOUS; SUBCUTANEOUS at 10:53

## 2024-01-01 RX ADMIN — GABAPENTIN 100 MG: 100 CAPSULE ORAL at 09:28

## 2024-01-01 RX ADMIN — IPRATROPIUM BROMIDE AND ALBUTEROL SULFATE 3 ML: 2.5; .5 SOLUTION RESPIRATORY (INHALATION) at 12:19

## 2024-01-01 RX ADMIN — APIXABAN 2.5 MG: 2.5 TABLET, FILM COATED ORAL at 20:19

## 2024-01-01 RX ADMIN — FEBUXOSTAT 40 MG: 40 TABLET ORAL at 08:55

## 2024-01-01 RX ADMIN — GABAPENTIN 100 MG: 100 CAPSULE ORAL at 15:35

## 2024-01-01 RX ADMIN — MIDODRINE HYDROCHLORIDE 30 MG: 10 TABLET ORAL at 18:30

## 2024-01-01 RX ADMIN — ACETAMINOPHEN 650 MG: 325 TABLET ORAL at 08:26

## 2024-01-01 RX ADMIN — MIDODRINE HYDROCHLORIDE 15 MG: 5 TABLET ORAL at 08:41

## 2024-01-01 RX ADMIN — BUDESONIDE INHALATION 0.5 MG: 0.5 SUSPENSION RESPIRATORY (INHALATION) at 19:42

## 2024-01-01 RX ADMIN — ESCITALOPRAM OXALATE 10 MG: 10 TABLET ORAL at 21:38

## 2024-01-01 RX ADMIN — NYSTATIN: 100000 POWDER TOPICAL at 10:51

## 2024-01-01 RX ADMIN — Medication: at 19:53

## 2024-01-01 RX ADMIN — APIXABAN 2.5 MG: 2.5 TABLET, FILM COATED ORAL at 08:31

## 2024-01-01 RX ADMIN — NOREPINEPHRINE BITARTRATE 0.09 MCG/KG/MIN: 8 INJECTION, SOLUTION INTRAVENOUS at 22:26

## 2024-01-01 RX ADMIN — SODIUM CHLORIDE SOLN NEBU 3% 3 ML: 3 NEBU SOLN at 19:14

## 2024-01-01 RX ADMIN — INSULIN LISPRO 4 UNITS: 100 INJECTION, SOLUTION INTRAVENOUS; SUBCUTANEOUS at 09:15

## 2024-01-01 RX ADMIN — MIDODRINE HYDROCHLORIDE 15 MG: 5 TABLET ORAL at 12:19

## 2024-01-01 RX ADMIN — VANCOMYCIN HYDROCHLORIDE 125 MG: 125 CAPSULE ORAL at 06:35

## 2024-01-01 RX ADMIN — IPRATROPIUM BROMIDE AND ALBUTEROL SULFATE 3 ML: 2.5; .5 SOLUTION RESPIRATORY (INHALATION) at 15:21

## 2024-01-01 RX ADMIN — NYSTATIN: 100000 CREAM TOPICAL at 10:31

## 2024-01-01 RX ADMIN — EPOETIN ALFA-EPBX 10000 UNITS: 10000 INJECTION, SOLUTION INTRAVENOUS; SUBCUTANEOUS at 12:34

## 2024-01-01 RX ADMIN — MIDODRINE HYDROCHLORIDE 15 MG: 5 TABLET ORAL at 16:13

## 2024-01-01 RX ADMIN — ESCITALOPRAM OXALATE 10 MG: 10 TABLET ORAL at 09:18

## 2024-01-01 RX ADMIN — PIPERACILLIN SODIUM AND TAZOBACTAM SODIUM 2.25 G: 2; .25 INJECTION, SOLUTION INTRAVENOUS at 12:22

## 2024-01-01 RX ADMIN — VANCOMYCIN HYDROCHLORIDE 125 MG: 125 CAPSULE ORAL at 08:28

## 2024-01-01 RX ADMIN — SODIUM CHLORIDE SOLN NEBU 3% 3 ML: 3 NEBU SOLN at 19:35

## 2024-01-01 RX ADMIN — SODIUM CHLORIDE SOLN NEBU 3% 3 ML: 3 NEBU SOLN at 08:46

## 2024-01-01 RX ADMIN — GABAPENTIN 100 MG: 100 CAPSULE ORAL at 15:36

## 2024-01-01 RX ADMIN — ACETAMINOPHEN 650 MG: 325 TABLET ORAL at 08:52

## 2024-01-01 RX ADMIN — VANCOMYCIN HYDROCHLORIDE 125 MG: 125 CAPSULE ORAL at 09:23

## 2024-01-01 RX ADMIN — GABAPENTIN 100 MG: 100 CAPSULE ORAL at 21:46

## 2024-01-01 RX ADMIN — APIXABAN 2.5 MG: 2.5 TABLET, FILM COATED ORAL at 20:56

## 2024-01-01 RX ADMIN — HEPARIN SODIUM 2000 UNITS: 1000 INJECTION INTRAVENOUS; SUBCUTANEOUS at 17:53

## 2024-01-01 RX ADMIN — SODIUM CHLORIDE SOLN NEBU 3% 3 ML: 3 NEBU SOLN at 11:49

## 2024-01-01 RX ADMIN — MIDODRINE HYDROCHLORIDE 15 MG: 5 TABLET ORAL at 16:59

## 2024-01-01 RX ADMIN — NYSTATIN: 100000 POWDER TOPICAL at 08:54

## 2024-01-01 RX ADMIN — NYSTATIN: 100000 CREAM TOPICAL at 10:34

## 2024-01-01 RX ADMIN — VANCOMYCIN HYDROCHLORIDE 125 MG: 125 CAPSULE ORAL at 21:44

## 2024-01-01 RX ADMIN — MIDODRINE HYDROCHLORIDE 15 MG: 5 TABLET ORAL at 17:01

## 2024-01-01 RX ADMIN — GABAPENTIN 100 MG: 100 CAPSULE ORAL at 14:33

## 2024-01-01 RX ADMIN — RUGBY ZINC OXIDE 20% 1 APPLICATION: 20 OINTMENT TOPICAL at 20:08

## 2024-01-01 RX ADMIN — INSULIN LISPRO 1 UNITS: 100 INJECTION, SOLUTION INTRAVENOUS; SUBCUTANEOUS at 17:29

## 2024-01-01 RX ADMIN — NYSTATIN: 100000 POWDER TOPICAL at 21:26

## 2024-01-01 RX ADMIN — NYSTATIN: 100000 POWDER TOPICAL at 09:49

## 2024-01-01 RX ADMIN — ALBUMIN (HUMAN) 12.5 G: 12.5 SOLUTION INTRAVENOUS at 12:10

## 2024-01-01 RX ADMIN — PIPERACILLIN SODIUM AND TAZOBACTAM SODIUM 2.25 G: 2; .25 INJECTION, SOLUTION INTRAVENOUS at 04:49

## 2024-01-01 RX ADMIN — NYSTATIN: 100000 POWDER TOPICAL at 20:07

## 2024-01-01 RX ADMIN — GABAPENTIN 100 MG: 100 CAPSULE ORAL at 21:27

## 2024-01-01 RX ADMIN — SODIUM CHLORIDE SOLN NEBU 3% 3 ML: 3 NEBU SOLN at 02:03

## 2024-01-01 RX ADMIN — NYSTATIN: 100000 POWDER TOPICAL at 10:34

## 2024-01-01 RX ADMIN — INSULIN GLARGINE 6 UNITS: 100 INJECTION, SOLUTION SUBCUTANEOUS at 20:12

## 2024-01-01 RX ADMIN — MIDODRINE HYDROCHLORIDE 15 MG: 5 TABLET ORAL at 08:58

## 2024-01-01 RX ADMIN — GABAPENTIN 100 MG: 100 CAPSULE ORAL at 09:34

## 2024-01-01 RX ADMIN — PANTOPRAZOLE SODIUM 40 MG: 40 TABLET, DELAYED RELEASE ORAL at 08:15

## 2024-01-01 RX ADMIN — APIXABAN 2.5 MG: 2.5 TABLET, FILM COATED ORAL at 09:48

## 2024-01-01 RX ADMIN — EPOETIN ALFA-EPBX 10000 UNITS: 10000 INJECTION, SOLUTION INTRAVENOUS; SUBCUTANEOUS at 12:10

## 2024-01-01 RX ADMIN — SODIUM CHLORIDE SOLN NEBU 3% 3 ML: 3 NEBU SOLN at 07:25

## 2024-01-01 RX ADMIN — IPRATROPIUM BROMIDE AND ALBUTEROL SULFATE 3 ML: 2.5; .5 SOLUTION RESPIRATORY (INHALATION) at 11:04

## 2024-01-01 RX ADMIN — FLUDROCORTISONE ACETATE 0.1 MG: 0.1 TABLET ORAL at 21:13

## 2024-01-01 RX ADMIN — APIXABAN 2.5 MG: 2.5 TABLET, FILM COATED ORAL at 08:24

## 2024-01-01 RX ADMIN — MIDODRINE HYDROCHLORIDE 15 MG: 5 TABLET ORAL at 09:29

## 2024-01-01 RX ADMIN — SODIUM CHLORIDE SOLN NEBU 3% 3 ML: 3 NEBU SOLN at 07:43

## 2024-01-01 RX ADMIN — MIDODRINE HYDROCHLORIDE 15 MG: 5 TABLET ORAL at 18:27

## 2024-01-01 RX ADMIN — VANCOMYCIN HYDROCHLORIDE 125 MG: 125 CAPSULE ORAL at 09:00

## 2024-01-01 RX ADMIN — INSULIN LISPRO 2 UNITS: 100 INJECTION, SOLUTION INTRAVENOUS; SUBCUTANEOUS at 15:13

## 2024-01-01 RX ADMIN — MIDODRINE HYDROCHLORIDE 20 MG: 10 TABLET ORAL at 08:27

## 2024-01-01 RX ADMIN — IPRATROPIUM BROMIDE AND ALBUTEROL SULFATE 3 ML: 2.5; .5 SOLUTION RESPIRATORY (INHALATION) at 11:34

## 2024-01-01 RX ADMIN — SIMVASTATIN 20 MG: 20 TABLET, FILM COATED ORAL at 21:27

## 2024-01-01 RX ADMIN — ACETAMINOPHEN 1000 MG: 500 TABLET ORAL at 14:34

## 2024-01-01 RX ADMIN — BUDESONIDE INHALATION 0.25 MG: 0.5 SUSPENSION RESPIRATORY (INHALATION) at 19:19

## 2024-01-01 RX ADMIN — INSULIN LISPRO 4 UNITS: 100 INJECTION, SOLUTION INTRAVENOUS; SUBCUTANEOUS at 16:48

## 2024-01-01 RX ADMIN — IPRATROPIUM BROMIDE AND ALBUTEROL SULFATE 3 ML: 2.5; .5 SOLUTION RESPIRATORY (INHALATION) at 20:46

## 2024-01-01 RX ADMIN — NOREPINEPHRINE BITARTRATE 0.1 MCG/KG/MIN: 8 INJECTION, SOLUTION INTRAVENOUS at 11:26

## 2024-01-01 RX ADMIN — VANCOMYCIN 750 MG: 750 INJECTION, SOLUTION INTRAVENOUS at 17:54

## 2024-01-01 RX ADMIN — PANTOPRAZOLE SODIUM 40 MG: 40 TABLET, DELAYED RELEASE ORAL at 08:11

## 2024-01-01 RX ADMIN — NYSTATIN: 100000 CREAM TOPICAL at 20:19

## 2024-01-01 RX ADMIN — IPRATROPIUM BROMIDE AND ALBUTEROL SULFATE 3 ML: 2.5; .5 SOLUTION RESPIRATORY (INHALATION) at 19:56

## 2024-01-01 RX ADMIN — INSULIN LISPRO 6 UNITS: 100 INJECTION, SOLUTION INTRAVENOUS; SUBCUTANEOUS at 12:23

## 2024-01-01 RX ADMIN — PIPERACILLIN SODIUM AND TAZOBACTAM SODIUM 2.25 G: 2; .25 INJECTION, SOLUTION INTRAVENOUS at 13:39

## 2024-01-01 RX ADMIN — PANTOPRAZOLE SODIUM 40 MG: 40 TABLET, DELAYED RELEASE ORAL at 08:29

## 2024-01-01 RX ADMIN — MIDODRINE HYDROCHLORIDE 15 MG: 5 TABLET ORAL at 08:26

## 2024-01-01 RX ADMIN — IPRATROPIUM BROMIDE AND ALBUTEROL SULFATE 3 ML: 2.5; .5 SOLUTION RESPIRATORY (INHALATION) at 07:31

## 2024-01-01 RX ADMIN — NOREPINEPHRINE BITARTRATE 0.08 MCG/KG/MIN: 8 INJECTION, SOLUTION INTRAVENOUS at 05:09

## 2024-01-01 RX ADMIN — VASOPRESSIN 0.03 UNITS/MIN: 0.2 INJECTION INTRAVENOUS at 12:17

## 2024-01-01 RX ADMIN — VANCOMYCIN 750 MG: 750 INJECTION, SOLUTION INTRAVENOUS at 22:17

## 2024-01-01 RX ADMIN — IPRATROPIUM BROMIDE AND ALBUTEROL SULFATE 3 ML: 2.5; .5 SOLUTION RESPIRATORY (INHALATION) at 12:27

## 2024-01-01 RX ADMIN — MIDODRINE HYDROCHLORIDE 15 MG: 5 TABLET ORAL at 16:22

## 2024-01-01 RX ADMIN — APIXABAN 2.5 MG: 2.5 TABLET, FILM COATED ORAL at 09:59

## 2024-01-01 RX ADMIN — NOREPINEPHRINE BITARTRATE 0.05 MCG/KG/MIN: 8 INJECTION, SOLUTION INTRAVENOUS at 10:32

## 2024-01-01 RX ADMIN — APIXABAN 2.5 MG: 2.5 TABLET, FILM COATED ORAL at 08:27

## 2024-01-01 RX ADMIN — NYSTATIN 1 APPLICATION: 100000 POWDER TOPICAL at 08:52

## 2024-01-01 RX ADMIN — MIDODRINE HYDROCHLORIDE 20 MG: 10 TABLET ORAL at 08:12

## 2024-01-01 RX ADMIN — COLLAGENASE SANTYL: 250 OINTMENT TOPICAL at 08:26

## 2024-01-01 RX ADMIN — GABAPENTIN 100 MG: 100 CAPSULE ORAL at 20:41

## 2024-01-01 RX ADMIN — APIXABAN 2.5 MG: 2.5 TABLET, FILM COATED ORAL at 10:06

## 2024-01-01 RX ADMIN — EPINEPHRINE 1 MG: 0.1 INJECTION INTRAVENOUS at 13:41

## 2024-01-01 RX ADMIN — VANCOMYCIN HYDROCHLORIDE 125 MG: 125 CAPSULE ORAL at 20:31

## 2024-01-01 RX ADMIN — RUGBY ZINC OXIDE 20% 1 APPLICATION: 20 OINTMENT TOPICAL at 20:55

## 2024-01-01 RX ADMIN — APIXABAN 2.5 MG: 2.5 TABLET, FILM COATED ORAL at 20:35

## 2024-01-01 RX ADMIN — FLUDROCORTISONE ACETATE 0.1 MG: 0.1 TABLET ORAL at 08:28

## 2024-01-01 RX ADMIN — RUGBY ZINC OXIDE 20% 1 APPLICATION: 20 OINTMENT TOPICAL at 09:30

## 2024-01-01 RX ADMIN — APIXABAN 2.5 MG: 2.5 TABLET, FILM COATED ORAL at 08:32

## 2024-01-01 RX ADMIN — HEPARIN SODIUM 1600 UNITS: 1000 INJECTION INTRAVENOUS; SUBCUTANEOUS at 13:31

## 2024-01-01 RX ADMIN — GABAPENTIN 100 MG: 100 CAPSULE ORAL at 22:21

## 2024-01-01 RX ADMIN — NYSTATIN 1 APPLICATION: 100000 POWDER TOPICAL at 09:00

## 2024-01-01 RX ADMIN — DOXYCYCLINE HYCLATE 100 MG: 100 CAPSULE ORAL at 12:46

## 2024-01-01 RX ADMIN — SODIUM CHLORIDE SOLN NEBU 3% 3 ML: 3 NEBU SOLN at 07:16

## 2024-01-01 RX ADMIN — VANCOMYCIN HYDROCHLORIDE 125 MG: 125 CAPSULE ORAL at 09:32

## 2024-01-01 RX ADMIN — NYSTATIN: 100000 POWDER TOPICAL at 21:11

## 2024-01-01 RX ADMIN — INSULIN GLARGINE 6 UNITS: 100 INJECTION, SOLUTION SUBCUTANEOUS at 21:24

## 2024-01-01 RX ADMIN — MIDODRINE HYDROCHLORIDE 20 MG: 10 TABLET ORAL at 17:29

## 2024-01-01 RX ADMIN — MIDODRINE HYDROCHLORIDE 15 MG: 5 TABLET ORAL at 12:49

## 2024-01-01 RX ADMIN — VASOPRESSIN 0.03 UNITS/MIN: 0.2 INJECTION INTRAVENOUS at 14:35

## 2024-01-01 RX ADMIN — VANCOMYCIN HYDROCHLORIDE 125 MG: 125 CAPSULE ORAL at 20:09

## 2024-01-01 RX ADMIN — VANCOMYCIN HYDROCHLORIDE 125 MG: 125 CAPSULE ORAL at 09:08

## 2024-01-01 RX ADMIN — NOREPINEPHRINE BITARTRATE 0.09 MCG/KG/MIN: 8 INJECTION, SOLUTION INTRAVENOUS at 01:25

## 2024-01-01 RX ADMIN — CEFEPIME 1 G: 1 INJECTION, POWDER, FOR SOLUTION INTRAMUSCULAR; INTRAVENOUS at 20:12

## 2024-01-01 RX ADMIN — ESCITALOPRAM OXALATE 10 MG: 10 TABLET ORAL at 20:50

## 2024-01-01 RX ADMIN — GABAPENTIN 100 MG: 100 CAPSULE ORAL at 09:18

## 2024-01-01 RX ADMIN — IPRATROPIUM BROMIDE AND ALBUTEROL SULFATE 3 ML: 2.5; .5 SOLUTION RESPIRATORY (INHALATION) at 12:05

## 2024-01-01 RX ADMIN — ESCITALOPRAM OXALATE 10 MG: 10 TABLET ORAL at 22:17

## 2024-01-01 RX ADMIN — VANCOMYCIN HYDROCHLORIDE 500 MG: 250 CAPSULE ORAL at 17:15

## 2024-01-01 RX ADMIN — FLUDROCORTISONE ACETATE 0.1 MG: 0.1 TABLET ORAL at 09:00

## 2024-01-01 RX ADMIN — SODIUM CHLORIDE SOLN NEBU 3% 3 ML: 3 NEBU SOLN at 15:29

## 2024-01-01 RX ADMIN — EPOETIN ALFA-EPBX 10000 UNITS: 10000 INJECTION, SOLUTION INTRAVENOUS; SUBCUTANEOUS at 09:53

## 2024-01-01 RX ADMIN — NOREPINEPHRINE BITARTRATE 0.39 MCG/KG/MIN: 8 INJECTION, SOLUTION INTRAVENOUS at 14:48

## 2024-01-01 RX ADMIN — Medication 500 MG: at 10:38

## 2024-01-01 RX ADMIN — NOREPINEPHRINE BITARTRATE 0.35 MCG/KG/MIN: 8 INJECTION, SOLUTION INTRAVENOUS at 04:49

## 2024-01-01 RX ADMIN — HEPARIN SODIUM 1600 UNITS: 1000 INJECTION INTRAVENOUS; SUBCUTANEOUS at 17:12

## 2024-01-01 RX ADMIN — SODIUM CHLORIDE SOLN NEBU 3% 3 ML: 3 NEBU SOLN at 19:57

## 2024-01-01 RX ADMIN — ESCITALOPRAM OXALATE 10 MG: 10 TABLET ORAL at 21:19

## 2024-01-01 RX ADMIN — CEFEPIME 1 G: 1 INJECTION, POWDER, FOR SOLUTION INTRAMUSCULAR; INTRAVENOUS at 14:16

## 2024-01-01 RX ADMIN — CEFEPIME 1 G: 1 INJECTION, POWDER, FOR SOLUTION INTRAMUSCULAR; INTRAVENOUS at 21:12

## 2024-01-01 RX ADMIN — FLUDROCORTISONE ACETATE 0.1 MG: 0.1 TABLET ORAL at 20:29

## 2024-01-01 RX ADMIN — ACETAMINOPHEN 650 MG: 325 TABLET ORAL at 21:07

## 2024-01-01 RX ADMIN — NYSTATIN: 100000 CREAM TOPICAL at 09:45

## 2024-01-01 RX ADMIN — SODIUM CHLORIDE SOLN NEBU 3% 3 ML: 3 NEBU SOLN at 08:20

## 2024-01-01 RX ADMIN — NYSTATIN: 100000 POWDER TOPICAL at 08:38

## 2024-01-01 RX ADMIN — MIDODRINE HYDROCHLORIDE 15 MG: 5 TABLET ORAL at 09:14

## 2024-01-01 RX ADMIN — INSULIN LISPRO 2 UNITS: 100 INJECTION, SOLUTION INTRAVENOUS; SUBCUTANEOUS at 16:57

## 2024-01-01 RX ADMIN — RUGBY ZINC OXIDE 20% 1 APPLICATION: 20 OINTMENT TOPICAL at 11:29

## 2024-01-01 RX ADMIN — INSULIN LISPRO 2 UNITS: 100 INJECTION, SOLUTION INTRAVENOUS; SUBCUTANEOUS at 16:20

## 2024-01-01 RX ADMIN — INSULIN LISPRO 2 UNITS: 100 INJECTION, SOLUTION INTRAVENOUS; SUBCUTANEOUS at 10:00

## 2024-01-01 RX ADMIN — GABAPENTIN 100 MG: 100 CAPSULE ORAL at 22:00

## 2024-01-01 RX ADMIN — VASOPRESSIN 0.03 UNITS/MIN: 0.2 INJECTION INTRAVENOUS at 16:47

## 2024-01-01 RX ADMIN — SODIUM CHLORIDE SOLN NEBU 3% 3 ML: 3 NEBU SOLN at 10:39

## 2024-01-01 RX ADMIN — APIXABAN 2.5 MG: 2.5 TABLET, FILM COATED ORAL at 21:38

## 2024-01-01 RX ADMIN — SODIUM CHLORIDE 2229 ML: 900 INJECTION, SOLUTION INTRAVENOUS at 14:16

## 2024-01-01 RX ADMIN — FLUDROCORTISONE ACETATE 0.1 MG: 0.1 TABLET ORAL at 21:07

## 2024-01-01 RX ADMIN — NOREPINEPHRINE BITARTRATE 0.08 MCG/KG/MIN: 8 INJECTION, SOLUTION INTRAVENOUS at 14:24

## 2024-01-01 RX ADMIN — EPOETIN ALFA-EPBX 10000 UNITS: 10000 INJECTION, SOLUTION INTRAVENOUS; SUBCUTANEOUS at 12:18

## 2024-01-01 RX ADMIN — MIDODRINE HYDROCHLORIDE 15 MG: 5 TABLET ORAL at 09:16

## 2024-01-01 RX ADMIN — INSULIN LISPRO 2 UNITS: 100 INJECTION, SOLUTION INTRAVENOUS; SUBCUTANEOUS at 12:58

## 2024-01-01 RX ADMIN — NOREPINEPHRINE BITARTRATE 0.35 MCG/KG/MIN: 8 INJECTION, SOLUTION INTRAVENOUS at 12:24

## 2024-01-01 RX ADMIN — APIXABAN 2.5 MG: 2.5 TABLET, FILM COATED ORAL at 22:00

## 2024-01-01 RX ADMIN — HEPARIN SODIUM 1600 UNITS: 1000 INJECTION INTRAVENOUS; SUBCUTANEOUS at 16:12

## 2024-01-01 RX ADMIN — INSULIN LISPRO 2 UNITS: 100 INJECTION, SOLUTION INTRAVENOUS; SUBCUTANEOUS at 09:19

## 2024-01-01 RX ADMIN — IPRATROPIUM BROMIDE AND ALBUTEROL SULFATE 3 ML: 2.5; .5 SOLUTION RESPIRATORY (INHALATION) at 20:45

## 2024-01-01 RX ADMIN — APIXABAN 2.5 MG: 2.5 TABLET, FILM COATED ORAL at 08:18

## 2024-01-01 RX ADMIN — MAGNESIUM SULFATE HEPTAHYDRATE 2 G: 40 INJECTION, SOLUTION INTRAVENOUS at 08:35

## 2024-01-01 RX ADMIN — MIDODRINE HYDROCHLORIDE 15 MG: 5 TABLET ORAL at 11:33

## 2024-01-01 RX ADMIN — CEFEPIME 1 G: 1 INJECTION, POWDER, FOR SOLUTION INTRAMUSCULAR; INTRAVENOUS at 22:00

## 2024-01-01 RX ADMIN — MIDODRINE HYDROCHLORIDE 20 MG: 10 TABLET ORAL at 15:58

## 2024-01-01 RX ADMIN — DOXYCYCLINE HYCLATE 100 MG: 100 CAPSULE ORAL at 12:42

## 2024-01-01 RX ADMIN — NOREPINEPHRINE BITARTRATE 0.08 MCG/KG/MIN: 8 INJECTION, SOLUTION INTRAVENOUS at 01:10

## 2024-01-01 RX ADMIN — GABAPENTIN 100 MG: 100 CAPSULE ORAL at 09:09

## 2024-01-01 RX ADMIN — IPRATROPIUM BROMIDE AND ALBUTEROL SULFATE 3 ML: 2.5; .5 SOLUTION RESPIRATORY (INHALATION) at 08:27

## 2024-01-01 RX ADMIN — IPRATROPIUM BROMIDE AND ALBUTEROL SULFATE 3 ML: 2.5; .5 SOLUTION RESPIRATORY (INHALATION) at 18:48

## 2024-01-01 RX ADMIN — FLUDROCORTISONE ACETATE 0.1 MG: 0.1 TABLET ORAL at 20:56

## 2024-01-01 RX ADMIN — HEPARIN SODIUM 2000 UNITS: 1000 INJECTION INTRAVENOUS; SUBCUTANEOUS at 14:47

## 2024-01-01 RX ADMIN — SODIUM CHLORIDE SOLN NEBU 3% 3 ML: 3 NEBU SOLN at 00:12

## 2024-01-01 RX ADMIN — MIDODRINE HYDROCHLORIDE 15 MG: 5 TABLET ORAL at 17:35

## 2024-01-01 RX ADMIN — INSULIN LISPRO 2 UNITS: 100 INJECTION, SOLUTION INTRAVENOUS; SUBCUTANEOUS at 09:30

## 2024-01-01 RX ADMIN — INSULIN LISPRO 2 UNITS: 100 INJECTION, SOLUTION INTRAVENOUS; SUBCUTANEOUS at 10:05

## 2024-01-01 RX ADMIN — MIDODRINE HYDROCHLORIDE 15 MG: 5 TABLET ORAL at 09:21

## 2024-01-01 RX ADMIN — FLUDROCORTISONE ACETATE 0.1 MG: 0.1 TABLET ORAL at 14:33

## 2024-01-01 RX ADMIN — DOXYCYCLINE HYCLATE 100 MG: 100 CAPSULE ORAL at 09:18

## 2024-01-01 RX ADMIN — NYSTATIN: 100000 POWDER TOPICAL at 20:53

## 2024-01-01 RX ADMIN — ACETYLCYSTEINE 600 MG: 200 INHALANT RESPIRATORY (INHALATION) at 10:45

## 2024-01-01 RX ADMIN — MIDODRINE HYDROCHLORIDE 15 MG: 5 TABLET ORAL at 12:09

## 2024-01-01 RX ADMIN — MIDODRINE HYDROCHLORIDE 15 MG: 5 TABLET ORAL at 16:25

## 2024-01-01 RX ADMIN — MIDODRINE HYDROCHLORIDE 15 MG: 5 TABLET ORAL at 17:38

## 2024-01-01 RX ADMIN — ACETAMINOPHEN 1000 MG: 500 TABLET ORAL at 20:18

## 2024-01-01 RX ADMIN — NYSTATIN 1 APPLICATION: 100000 POWDER TOPICAL at 11:30

## 2024-01-01 RX ADMIN — NYSTATIN: 100000 CREAM TOPICAL at 11:55

## 2024-01-01 RX ADMIN — INSULIN LISPRO 2 UNITS: 100 INJECTION, SOLUTION INTRAVENOUS; SUBCUTANEOUS at 08:09

## 2024-01-01 RX ADMIN — SODIUM CHLORIDE SOLN NEBU 3% 3 ML: 3 NEBU SOLN at 14:56

## 2024-01-01 RX ADMIN — APIXABAN 2.5 MG: 2.5 TABLET, FILM COATED ORAL at 20:27

## 2024-01-01 RX ADMIN — FLUDROCORTISONE ACETATE 0.1 MG: 0.1 TABLET ORAL at 15:39

## 2024-01-01 RX ADMIN — HEPARIN SODIUM 1600 UNITS: 1000 INJECTION INTRAVENOUS; SUBCUTANEOUS at 21:00

## 2024-01-01 RX ADMIN — PANTOPRAZOLE SODIUM 40 MG: 40 TABLET, DELAYED RELEASE ORAL at 08:17

## 2024-01-01 RX ADMIN — HEPARIN SODIUM 1600 UNITS: 1000 INJECTION INTRAVENOUS; SUBCUTANEOUS at 06:10

## 2024-01-01 RX ADMIN — RUGBY ZINC OXIDE 20% 1 APPLICATION: 20 OINTMENT TOPICAL at 22:20

## 2024-01-01 RX ADMIN — NYSTATIN: 100000 CREAM TOPICAL at 08:54

## 2024-01-01 RX ADMIN — INSULIN LISPRO 4 UNITS: 100 INJECTION, SOLUTION INTRAVENOUS; SUBCUTANEOUS at 16:26

## 2024-01-01 RX ADMIN — PIPERACILLIN SODIUM AND TAZOBACTAM SODIUM 2.25 G: 2; .25 INJECTION, SOLUTION INTRAVENOUS at 05:18

## 2024-01-01 RX ADMIN — RUGBY ZINC OXIDE 20% 1 APPLICATION: 20 OINTMENT TOPICAL at 21:08

## 2024-01-01 RX ADMIN — VANCOMYCIN HYDROCHLORIDE 125 MG: 125 CAPSULE ORAL at 06:02

## 2024-01-01 RX ADMIN — NOREPINEPHRINE BITARTRATE 0.03 MCG/KG/MIN: 8 INJECTION, SOLUTION INTRAVENOUS at 08:43

## 2024-01-01 RX ADMIN — Medication 500 MG: at 22:00

## 2024-01-01 RX ADMIN — PIPERACILLIN SODIUM AND TAZOBACTAM SODIUM 2.25 G: 2; .25 INJECTION, SOLUTION INTRAVENOUS at 20:48

## 2024-01-01 RX ADMIN — FLUDROCORTISONE ACETATE 0.1 MG: 0.1 TABLET ORAL at 20:53

## 2024-01-01 RX ADMIN — APIXABAN 2.5 MG: 2.5 TABLET, FILM COATED ORAL at 22:49

## 2024-01-01 RX ADMIN — IPRATROPIUM BROMIDE AND ALBUTEROL SULFATE 3 ML: 2.5; .5 SOLUTION RESPIRATORY (INHALATION) at 20:26

## 2024-01-01 RX ADMIN — VANCOMYCIN HYDROCHLORIDE 125 MG: 125 CAPSULE ORAL at 17:39

## 2024-01-01 RX ADMIN — APIXABAN 2.5 MG: 2.5 TABLET, FILM COATED ORAL at 08:48

## 2024-01-01 RX ADMIN — DOXYCYCLINE HYCLATE 100 MG: 100 CAPSULE ORAL at 11:27

## 2024-01-01 RX ADMIN — APIXABAN 2.5 MG: 2.5 TABLET, FILM COATED ORAL at 08:14

## 2024-01-01 RX ADMIN — IPRATROPIUM BROMIDE AND ALBUTEROL SULFATE 3 ML: 2.5; .5 SOLUTION RESPIRATORY (INHALATION) at 15:35

## 2024-01-01 RX ADMIN — VANCOMYCIN 1 G: 1 INJECTION, SOLUTION INTRAVENOUS at 20:35

## 2024-01-01 RX ADMIN — DOXYCYCLINE HYCLATE 100 MG: 100 CAPSULE ORAL at 12:53

## 2024-01-01 RX ADMIN — FLUDROCORTISONE ACETATE 0.1 MG: 0.1 TABLET ORAL at 15:54

## 2024-01-01 RX ADMIN — MIDODRINE HYDROCHLORIDE 20 MG: 10 TABLET ORAL at 09:02

## 2024-01-01 RX ADMIN — VANCOMYCIN HYDROCHLORIDE 125 MG: 125 CAPSULE ORAL at 05:17

## 2024-01-01 RX ADMIN — NYSTATIN: 100000 POWDER TOPICAL at 20:59

## 2024-01-01 RX ADMIN — SODIUM CHLORIDE SOLN NEBU 3% 3 ML: 3 NEBU SOLN at 11:00

## 2024-01-01 RX ADMIN — INSULIN LISPRO 2 UNITS: 100 INJECTION, SOLUTION INTRAVENOUS; SUBCUTANEOUS at 08:21

## 2024-01-01 RX ADMIN — FLUDROCORTISONE ACETATE 0.1 MG: 0.1 TABLET ORAL at 08:11

## 2024-01-01 RX ADMIN — BUDESONIDE INHALATION 0.25 MG: 0.5 SUSPENSION RESPIRATORY (INHALATION) at 20:45

## 2024-01-01 RX ADMIN — INSULIN LISPRO 4 UNITS: 100 INJECTION, SOLUTION INTRAVENOUS; SUBCUTANEOUS at 08:25

## 2024-01-01 RX ADMIN — IPRATROPIUM BROMIDE AND ALBUTEROL SULFATE 3 ML: 2.5; .5 SOLUTION RESPIRATORY (INHALATION) at 22:53

## 2024-01-01 RX ADMIN — NYSTATIN 1 APPLICATION: 100000 CREAM TOPICAL at 21:15

## 2024-01-01 RX ADMIN — NYSTATIN: 100000 CREAM TOPICAL at 08:38

## 2024-01-01 RX ADMIN — VANCOMYCIN HYDROCHLORIDE 125 MG: 125 CAPSULE ORAL at 20:56

## 2024-01-01 RX ADMIN — DOXYCYCLINE HYCLATE 100 MG: 100 CAPSULE ORAL at 12:10

## 2024-01-01 RX ADMIN — ACETYLCYSTEINE 200 MG: 200 INHALANT RESPIRATORY (INHALATION) at 19:42

## 2024-01-01 RX ADMIN — DOXYCYCLINE HYCLATE 100 MG: 100 CAPSULE ORAL at 12:08

## 2024-01-01 RX ADMIN — NYSTATIN: 100000 CREAM TOPICAL at 21:11

## 2024-01-01 RX ADMIN — GABAPENTIN 100 MG: 100 CAPSULE ORAL at 08:26

## 2024-01-01 RX ADMIN — RUGBY ZINC OXIDE 20% 1 APPLICATION: 20 OINTMENT TOPICAL at 11:55

## 2024-01-01 RX ADMIN — VANCOMYCIN HYDROCHLORIDE 500 MG: 250 CAPSULE ORAL at 08:23

## 2024-01-01 RX ADMIN — ACETAMINOPHEN 650 MG: 325 TABLET ORAL at 08:10

## 2024-01-01 RX ADMIN — NYSTATIN: 100000 POWDER TOPICAL at 08:21

## 2024-01-01 RX ADMIN — VANCOMYCIN HYDROCHLORIDE 125 MG: 125 CAPSULE ORAL at 21:34

## 2024-01-01 RX ADMIN — INSULIN LISPRO 2 UNITS: 100 INJECTION, SOLUTION INTRAVENOUS; SUBCUTANEOUS at 15:33

## 2024-01-01 RX ADMIN — NOREPINEPHRINE BITARTRATE 0.09 MCG/KG/MIN: 8 INJECTION, SOLUTION INTRAVENOUS at 13:03

## 2024-01-01 RX ADMIN — IPRATROPIUM BROMIDE AND ALBUTEROL SULFATE 3 ML: 2.5; .5 SOLUTION RESPIRATORY (INHALATION) at 13:00

## 2024-01-01 RX ADMIN — IPRATROPIUM BROMIDE AND ALBUTEROL SULFATE 3 ML: 2.5; .5 SOLUTION RESPIRATORY (INHALATION) at 18:51

## 2024-01-01 RX ADMIN — MIDODRINE HYDROCHLORIDE 15 MG: 5 TABLET ORAL at 08:36

## 2024-01-01 RX ADMIN — IPRATROPIUM BROMIDE AND ALBUTEROL SULFATE 3 ML: 2.5; .5 SOLUTION RESPIRATORY (INHALATION) at 19:19

## 2024-01-01 RX ADMIN — VASOPRESSIN 0.03 UNITS/MIN: 0.2 INJECTION INTRAVENOUS at 06:10

## 2024-01-01 RX ADMIN — PIPERACILLIN SODIUM AND TAZOBACTAM SODIUM 2.25 G: 2; .25 INJECTION, SOLUTION INTRAVENOUS at 12:55

## 2024-01-01 RX ADMIN — ACETAMINOPHEN 650 MG: 325 TABLET ORAL at 21:51

## 2024-01-01 RX ADMIN — IPRATROPIUM BROMIDE AND ALBUTEROL SULFATE 3 ML: 2.5; .5 SOLUTION RESPIRATORY (INHALATION) at 11:43

## 2024-01-01 RX ADMIN — IPRATROPIUM BROMIDE AND ALBUTEROL SULFATE 3 ML: 2.5; .5 SOLUTION RESPIRATORY (INHALATION) at 20:27

## 2024-01-01 RX ADMIN — NYSTATIN: 100000 POWDER TOPICAL at 10:00

## 2024-01-01 RX ADMIN — IPRATROPIUM BROMIDE AND ALBUTEROL SULFATE 3 ML: 2.5; .5 SOLUTION RESPIRATORY (INHALATION) at 07:05

## 2024-01-01 RX ADMIN — RUGBY ZINC OXIDE 20% 1 APPLICATION: 20 OINTMENT TOPICAL at 08:10

## 2024-01-01 RX ADMIN — RUGBY ZINC OXIDE 20% 1 APPLICATION: 20 OINTMENT TOPICAL at 20:57

## 2024-01-01 RX ADMIN — FLUDROCORTISONE ACETATE 0.1 MG: 0.1 TABLET ORAL at 14:59

## 2024-01-01 RX ADMIN — SODIUM CHLORIDE SOLN NEBU 3% 3 ML: 3 NEBU SOLN at 20:30

## 2024-01-01 RX ADMIN — RUGBY ZINC OXIDE 20% 1 APPLICATION: 20 OINTMENT TOPICAL at 21:16

## 2024-01-01 RX ADMIN — VANCOMYCIN HYDROCHLORIDE 500 MG: 500 INJECTION, POWDER, LYOPHILIZED, FOR SOLUTION INTRAVENOUS at 17:20

## 2024-01-01 RX ADMIN — MAGNESIUM SULFATE HEPTAHYDRATE 2 G: 40 INJECTION, SOLUTION INTRAVENOUS at 05:55

## 2024-01-01 RX ADMIN — VANCOMYCIN HYDROCHLORIDE 125 MG: 125 CAPSULE ORAL at 09:54

## 2024-01-01 RX ADMIN — BUDESONIDE INHALATION 0.25 MG: 0.5 SUSPENSION RESPIRATORY (INHALATION) at 10:45

## 2024-01-01 RX ADMIN — NOREPINEPHRINE BITARTRATE 0.09 MCG/KG/MIN: 8 INJECTION, SOLUTION INTRAVENOUS at 22:15

## 2024-01-01 RX ADMIN — CEFEPIME 1 G: 1 INJECTION, POWDER, FOR SOLUTION INTRAMUSCULAR; INTRAVENOUS at 21:33

## 2024-01-01 RX ADMIN — IPRATROPIUM BROMIDE AND ALBUTEROL SULFATE 3 ML: 2.5; .5 SOLUTION RESPIRATORY (INHALATION) at 07:33

## 2024-01-01 RX ADMIN — MIDODRINE HYDROCHLORIDE 20 MG: 10 TABLET ORAL at 12:56

## 2024-01-01 RX ADMIN — NYSTATIN: 100000 POWDER TOPICAL at 21:45

## 2024-01-01 RX ADMIN — IPRATROPIUM BROMIDE AND ALBUTEROL SULFATE 3 ML: 2.5; .5 SOLUTION RESPIRATORY (INHALATION) at 20:03

## 2024-01-01 RX ADMIN — IPRATROPIUM BROMIDE AND ALBUTEROL SULFATE 3 ML: 2.5; .5 SOLUTION RESPIRATORY (INHALATION) at 07:25

## 2024-01-01 RX ADMIN — ESCITALOPRAM OXALATE 10 MG: 10 TABLET ORAL at 21:13

## 2024-01-01 RX ADMIN — Medication 400 MG: at 08:14

## 2024-01-01 RX ADMIN — NYSTATIN: 100000 POWDER TOPICAL at 08:28

## 2024-01-01 RX ADMIN — PANTOPRAZOLE SODIUM 40 MG: 40 TABLET, DELAYED RELEASE ORAL at 10:06

## 2024-01-01 RX ADMIN — NYSTATIN: 100000 POWDER TOPICAL at 10:10

## 2024-01-01 RX ADMIN — MIDODRINE HYDROCHLORIDE 15 MG: 5 TABLET ORAL at 17:13

## 2024-01-01 RX ADMIN — NYSTATIN: 100000 POWDER TOPICAL at 20:21

## 2024-01-01 RX ADMIN — IPRATROPIUM BROMIDE AND ALBUTEROL SULFATE 3 ML: 2.5; .5 SOLUTION RESPIRATORY (INHALATION) at 07:35

## 2024-01-01 RX ADMIN — CEFEPIME 1 G: 1 INJECTION, POWDER, FOR SOLUTION INTRAMUSCULAR; INTRAVENOUS at 23:18

## 2024-01-01 RX ADMIN — FLUDROCORTISONE ACETATE 0.1 MG: 0.1 TABLET ORAL at 15:31

## 2024-01-01 RX ADMIN — MIDODRINE HYDROCHLORIDE 20 MG: 10 TABLET ORAL at 17:59

## 2024-01-01 RX ADMIN — ACETAMINOPHEN 650 MG: 325 TABLET ORAL at 14:04

## 2024-01-01 RX ADMIN — PANTOPRAZOLE SODIUM 40 MG: 40 TABLET, DELAYED RELEASE ORAL at 09:59

## 2024-01-01 RX ADMIN — FLUDROCORTISONE ACETATE 0.1 MG: 0.1 TABLET ORAL at 20:41

## 2024-01-01 RX ADMIN — ESCITALOPRAM OXALATE 10 MG: 10 TABLET ORAL at 08:55

## 2024-01-01 RX ADMIN — SODIUM CHLORIDE SOLN NEBU 3% 3 ML: 3 NEBU SOLN at 11:41

## 2024-01-01 RX ADMIN — HYDROCORTISONE SODIUM SUCCINATE 50 MG: 100 INJECTION, POWDER, FOR SOLUTION INTRAMUSCULAR; INTRAVENOUS at 20:27

## 2024-01-01 RX ADMIN — ESCITALOPRAM OXALATE 10 MG: 10 TABLET ORAL at 21:06

## 2024-01-01 RX ADMIN — GABAPENTIN 100 MG: 100 CAPSULE ORAL at 09:00

## 2024-01-01 RX ADMIN — ACETAMINOPHEN 650 MG: 325 TABLET ORAL at 17:04

## 2024-01-01 RX ADMIN — NOREPINEPHRINE BITARTRATE 0.26 MCG/KG/MIN: 8 INJECTION, SOLUTION INTRAVENOUS at 00:36

## 2024-01-01 RX ADMIN — RUGBY ZINC OXIDE 20% 1 APPLICATION: 20 OINTMENT TOPICAL at 20:47

## 2024-01-01 RX ADMIN — INSULIN LISPRO 2 UNITS: 100 INJECTION, SOLUTION INTRAVENOUS; SUBCUTANEOUS at 18:16

## 2024-01-01 RX ADMIN — NYSTATIN: 100000 CREAM TOPICAL at 22:00

## 2024-01-01 RX ADMIN — RUGBY ZINC OXIDE 20% 1 APPLICATION: 20 OINTMENT TOPICAL at 10:34

## 2024-01-01 RX ADMIN — PANTOPRAZOLE SODIUM 40 MG: 40 TABLET, DELAYED RELEASE ORAL at 08:57

## 2024-01-01 RX ADMIN — IPRATROPIUM BROMIDE AND ALBUTEROL SULFATE 3 ML: 2.5; .5 SOLUTION RESPIRATORY (INHALATION) at 01:30

## 2024-01-01 RX ADMIN — RUGBY ZINC OXIDE 20% 1 APPLICATION: 20 OINTMENT TOPICAL at 21:10

## 2024-01-01 RX ADMIN — RUGBY ZINC OXIDE 20% 1 APPLICATION: 20 OINTMENT TOPICAL at 20:32

## 2024-01-01 RX ADMIN — INSULIN LISPRO 2 UNITS: 100 INJECTION, SOLUTION INTRAVENOUS; SUBCUTANEOUS at 17:52

## 2024-01-01 RX ADMIN — IPRATROPIUM BROMIDE AND ALBUTEROL SULFATE 3 ML: 2.5; .5 SOLUTION RESPIRATORY (INHALATION) at 08:14

## 2024-01-01 RX ADMIN — GABAPENTIN 100 MG: 100 CAPSULE ORAL at 16:34

## 2024-01-01 RX ADMIN — DOXYCYCLINE HYCLATE 100 MG: 100 CAPSULE ORAL at 12:16

## 2024-01-01 RX ADMIN — IPRATROPIUM BROMIDE AND ALBUTEROL SULFATE 3 ML: 2.5; .5 SOLUTION RESPIRATORY (INHALATION) at 19:44

## 2024-01-01 RX ADMIN — FENOFIBRATE 160 MG: 160 TABLET ORAL at 09:16

## 2024-01-01 RX ADMIN — NYSTATIN: 100000 CREAM TOPICAL at 20:49

## 2024-01-01 RX ADMIN — MIDODRINE HYDROCHLORIDE 15 MG: 5 TABLET ORAL at 12:48

## 2024-01-01 RX ADMIN — GABAPENTIN 100 MG: 100 CAPSULE ORAL at 16:11

## 2024-01-01 RX ADMIN — MIDODRINE HYDROCHLORIDE 15 MG: 5 TABLET ORAL at 17:30

## 2024-01-01 RX ADMIN — IPRATROPIUM BROMIDE AND ALBUTEROL SULFATE 3 ML: 2.5; .5 SOLUTION RESPIRATORY (INHALATION) at 11:39

## 2024-01-01 RX ADMIN — FLUDROCORTISONE ACETATE 0.1 MG: 0.1 TABLET ORAL at 16:13

## 2024-01-01 RX ADMIN — NYSTATIN: 100000 POWDER TOPICAL at 10:26

## 2024-01-01 RX ADMIN — VANCOMYCIN HYDROCHLORIDE 125 MG: 125 CAPSULE ORAL at 20:49

## 2024-01-01 RX ADMIN — ACETAMINOPHEN 1000 MG: 500 TABLET ORAL at 09:28

## 2024-01-01 RX ADMIN — VANCOMYCIN HYDROCHLORIDE 500 MG: 250 CAPSULE ORAL at 07:40

## 2024-01-01 RX ADMIN — INSULIN LISPRO 2 UNITS: 100 INJECTION, SOLUTION INTRAVENOUS; SUBCUTANEOUS at 17:48

## 2024-01-01 RX ADMIN — DOXYCYCLINE HYCLATE 100 MG: 100 CAPSULE ORAL at 12:56

## 2024-01-01 RX ADMIN — IPRATROPIUM BROMIDE AND ALBUTEROL SULFATE 3 ML: 2.5; .5 SOLUTION RESPIRATORY (INHALATION) at 11:50

## 2024-01-01 RX ADMIN — HYDROCORTISONE SODIUM SUCCINATE 50 MG: 100 INJECTION, POWDER, FOR SOLUTION INTRAMUSCULAR; INTRAVENOUS at 08:23

## 2024-01-01 RX ADMIN — CEFEPIME 1 G: 1 INJECTION, POWDER, FOR SOLUTION INTRAMUSCULAR; INTRAVENOUS at 21:46

## 2024-01-01 RX ADMIN — NOREPINEPHRINE BITARTRATE 0.1 MCG/KG/MIN: 8 INJECTION, SOLUTION INTRAVENOUS at 05:24

## 2024-01-01 RX ADMIN — DOXYCYCLINE HYCLATE 100 MG: 100 CAPSULE ORAL at 13:45

## 2024-01-01 RX ADMIN — INSULIN LISPRO 2 UNITS: 100 INJECTION, SOLUTION INTRAVENOUS; SUBCUTANEOUS at 09:08

## 2024-01-01 RX ADMIN — NYSTATIN: 100000 POWDER TOPICAL at 09:45

## 2024-01-01 RX ADMIN — IPRATROPIUM BROMIDE AND ALBUTEROL SULFATE 3 ML: 2.5; .5 SOLUTION RESPIRATORY (INHALATION) at 07:14

## 2024-01-01 RX ADMIN — PANTOPRAZOLE SODIUM 40 MG: 40 TABLET, DELAYED RELEASE ORAL at 08:16

## 2024-01-01 RX ADMIN — VANCOMYCIN HYDROCHLORIDE 125 MG: 125 CAPSULE ORAL at 22:49

## 2024-01-01 RX ADMIN — INSULIN LISPRO 4 UNITS: 100 INJECTION, SOLUTION INTRAVENOUS; SUBCUTANEOUS at 18:01

## 2024-01-01 RX ADMIN — GABAPENTIN 100 MG: 100 CAPSULE ORAL at 09:48

## 2024-01-01 RX ADMIN — INSULIN LISPRO 2 UNITS: 100 INJECTION, SOLUTION INTRAVENOUS; SUBCUTANEOUS at 17:02

## 2024-01-01 RX ADMIN — VANCOMYCIN HYDROCHLORIDE 125 MG: 125 CAPSULE ORAL at 22:17

## 2024-01-01 RX ADMIN — FLUDROCORTISONE ACETATE 0.1 MG: 0.1 TABLET ORAL at 21:00

## 2024-01-01 RX ADMIN — FLUDROCORTISONE ACETATE 0.1 MG: 0.1 TABLET ORAL at 17:02

## 2024-01-01 RX ADMIN — MIDODRINE HYDROCHLORIDE 15 MG: 5 TABLET ORAL at 07:42

## 2024-01-01 RX ADMIN — SODIUM CHLORIDE SOLN NEBU 3% 3 ML: 3 NEBU SOLN at 20:46

## 2024-01-01 RX ADMIN — MIDODRINE HYDROCHLORIDE 15 MG: 5 TABLET ORAL at 16:29

## 2024-01-01 RX ADMIN — FLUDROCORTISONE ACETATE 0.1 MG: 0.1 TABLET ORAL at 09:59

## 2024-01-01 RX ADMIN — ESCITALOPRAM OXALATE 10 MG: 10 TABLET ORAL at 20:13

## 2024-01-01 RX ADMIN — RUGBY ZINC OXIDE 20% 1 APPLICATION: 20 OINTMENT TOPICAL at 10:33

## 2024-01-01 RX ADMIN — NOREPINEPHRINE BITARTRATE 0.29 MCG/KG/MIN: 8 INJECTION, SOLUTION INTRAVENOUS at 00:31

## 2024-01-01 RX ADMIN — PSYLLIUM HUSK 1 PACKET: 3.4 POWDER ORAL at 11:00

## 2024-01-01 RX ADMIN — EPOETIN ALFA-EPBX 10000 UNITS: 10000 INJECTION, SOLUTION INTRAVENOUS; SUBCUTANEOUS at 13:53

## 2024-01-01 RX ADMIN — MIDODRINE HYDROCHLORIDE 20 MG: 10 TABLET ORAL at 07:52

## 2024-01-01 RX ADMIN — GABAPENTIN 100 MG: 100 CAPSULE ORAL at 08:59

## 2024-01-01 RX ADMIN — MIDODRINE HYDROCHLORIDE 15 MG: 5 TABLET ORAL at 17:10

## 2024-01-01 RX ADMIN — ACETYLCYSTEINE 600 MG: 200 INHALANT RESPIRATORY (INHALATION) at 15:06

## 2024-01-01 RX ADMIN — NYSTATIN: 100000 CREAM TOPICAL at 09:50

## 2024-01-01 RX ADMIN — SODIUM CHLORIDE SOLN NEBU 3% 3 ML: 3 NEBU SOLN at 08:27

## 2024-01-01 RX ADMIN — IPRATROPIUM BROMIDE AND ALBUTEROL SULFATE 3 ML: 2.5; .5 SOLUTION RESPIRATORY (INHALATION) at 07:17

## 2024-01-01 RX ADMIN — ESCITALOPRAM OXALATE 10 MG: 10 TABLET ORAL at 20:56

## 2024-01-01 RX ADMIN — FEBUXOSTAT 40 MG: 40 TABLET ORAL at 08:48

## 2024-01-01 RX ADMIN — MIDODRINE HYDROCHLORIDE 20 MG: 10 TABLET ORAL at 17:53

## 2024-01-01 RX ADMIN — ACETYLCYSTEINE 600 MG: 200 INHALANT RESPIRATORY (INHALATION) at 19:19

## 2024-01-01 RX ADMIN — ACETAMINOPHEN 1000 MG: 500 TABLET ORAL at 09:21

## 2024-01-01 RX ADMIN — OXYCODONE AND ACETAMINOPHEN 1 TABLET: 5; 325 TABLET ORAL at 21:30

## 2024-01-01 RX ADMIN — ESCITALOPRAM OXALATE 10 MG: 10 TABLET ORAL at 21:11

## 2024-01-01 RX ADMIN — PANTOPRAZOLE SODIUM 40 MG: 40 TABLET, DELAYED RELEASE ORAL at 08:56

## 2024-01-01 RX ADMIN — VANCOMYCIN HYDROCHLORIDE 125 MG: 125 CAPSULE ORAL at 08:59

## 2024-01-01 RX ADMIN — RUGBY ZINC OXIDE 20% 1 APPLICATION: 20 OINTMENT TOPICAL at 21:40

## 2024-01-01 RX ADMIN — MIDODRINE HYDROCHLORIDE 15 MG: 5 TABLET ORAL at 12:21

## 2024-01-01 RX ADMIN — INSULIN LISPRO 2 UNITS: 100 INJECTION, SOLUTION INTRAVENOUS; SUBCUTANEOUS at 10:21

## 2024-01-01 RX ADMIN — GABAPENTIN 100 MG: 100 CAPSULE ORAL at 08:10

## 2024-01-01 RX ADMIN — MIDODRINE HYDROCHLORIDE 15 MG: 5 TABLET ORAL at 13:01

## 2024-01-01 RX ADMIN — GABAPENTIN 100 MG: 100 CAPSULE ORAL at 08:27

## 2024-01-01 RX ADMIN — MIDODRINE HYDROCHLORIDE 15 MG: 5 TABLET ORAL at 09:09

## 2024-01-01 RX ADMIN — MIDODRINE HYDROCHLORIDE 15 MG: 5 TABLET ORAL at 08:47

## 2024-01-01 RX ADMIN — IPRATROPIUM BROMIDE AND ALBUTEROL SULFATE 3 ML: 2.5; .5 SOLUTION RESPIRATORY (INHALATION) at 12:08

## 2024-01-01 RX ADMIN — PANTOPRAZOLE SODIUM 40 MG: 40 INJECTION, POWDER, FOR SOLUTION INTRAVENOUS at 08:24

## 2024-01-01 RX ADMIN — NYSTATIN 1 APPLICATION: 100000 CREAM TOPICAL at 10:34

## 2024-01-01 RX ADMIN — VANCOMYCIN HYDROCHLORIDE 125 MG: 125 CAPSULE ORAL at 21:06

## 2024-01-01 RX ADMIN — VANCOMYCIN HYDROCHLORIDE 125 MG: 125 CAPSULE ORAL at 08:26

## 2024-01-01 RX ADMIN — FLUDROCORTISONE ACETATE 0.1 MG: 0.1 TABLET ORAL at 21:44

## 2024-01-01 RX ADMIN — NOREPINEPHRINE BITARTRATE 0.1 MCG/KG/MIN: 8 INJECTION, SOLUTION INTRAVENOUS at 14:34

## 2024-01-01 RX ADMIN — INSULIN LISPRO 2 UNITS: 100 INJECTION, SOLUTION INTRAVENOUS; SUBCUTANEOUS at 08:36

## 2024-01-01 RX ADMIN — ESCITALOPRAM OXALATE 10 MG: 10 TABLET ORAL at 08:59

## 2024-01-01 RX ADMIN — IPRATROPIUM BROMIDE AND ALBUTEROL SULFATE 3 ML: 2.5; .5 SOLUTION RESPIRATORY (INHALATION) at 20:10

## 2024-01-01 RX ADMIN — INSULIN LISPRO 2 UNITS: 100 INJECTION, SOLUTION INTRAVENOUS; SUBCUTANEOUS at 17:01

## 2024-01-01 RX ADMIN — IPRATROPIUM BROMIDE AND ALBUTEROL SULFATE 3 ML: 2.5; .5 SOLUTION RESPIRATORY (INHALATION) at 08:08

## 2024-01-01 RX ADMIN — APIXABAN 2.5 MG: 2.5 TABLET, FILM COATED ORAL at 20:09

## 2024-01-01 RX ADMIN — SIMVASTATIN 40 MG: 40 TABLET, FILM COATED ORAL at 20:12

## 2024-01-01 RX ADMIN — NYSTATIN 1 APPLICATION: 100000 POWDER TOPICAL at 21:26

## 2024-01-01 RX ADMIN — RUGBY ZINC OXIDE 20% 1 APPLICATION: 20 OINTMENT TOPICAL at 20:16

## 2024-01-01 RX ADMIN — ESCITALOPRAM OXALATE 10 MG: 10 TABLET ORAL at 21:33

## 2024-01-01 RX ADMIN — SODIUM CHLORIDE SOLN NEBU 3% 3 ML: 3 NEBU SOLN at 15:35

## 2024-01-01 RX ADMIN — SIMVASTATIN 40 MG: 40 TABLET, FILM COATED ORAL at 21:03

## 2024-01-01 RX ADMIN — GABAPENTIN 100 MG: 100 CAPSULE ORAL at 21:18

## 2024-01-01 RX ADMIN — MIDODRINE HYDROCHLORIDE 15 MG: 5 TABLET ORAL at 17:04

## 2024-01-01 RX ADMIN — NYSTATIN: 100000 POWDER TOPICAL at 08:37

## 2024-01-01 RX ADMIN — APIXABAN 2.5 MG: 2.5 TABLET, FILM COATED ORAL at 08:47

## 2024-01-01 RX ADMIN — NOREPINEPHRINE BITARTRATE 0.36 MCG/KG/MIN: 8 INJECTION, SOLUTION INTRAVENOUS at 10:48

## 2024-01-01 RX ADMIN — GABAPENTIN 100 MG: 100 CAPSULE ORAL at 08:07

## 2024-01-01 RX ADMIN — NYSTATIN: 100000 CREAM TOPICAL at 08:28

## 2024-01-01 RX ADMIN — ACETAMINOPHEN 650 MG: 325 TABLET ORAL at 16:56

## 2024-01-01 RX ADMIN — GABAPENTIN 100 MG: 100 CAPSULE ORAL at 08:58

## 2024-01-01 RX ADMIN — GABAPENTIN 100 MG: 100 CAPSULE ORAL at 08:15

## 2024-01-01 RX ADMIN — NYSTATIN: 100000 CREAM TOPICAL at 08:27

## 2024-01-01 RX ADMIN — PANTOPRAZOLE SODIUM 40 MG: 40 TABLET, DELAYED RELEASE ORAL at 09:28

## 2024-01-01 RX ADMIN — MIDODRINE HYDROCHLORIDE 15 MG: 5 TABLET ORAL at 09:54

## 2024-01-01 RX ADMIN — VANCOMYCIN HYDROCHLORIDE 125 MG: 125 CAPSULE ORAL at 12:55

## 2024-01-01 RX ADMIN — IPRATROPIUM BROMIDE AND ALBUTEROL SULFATE 3 ML: 2.5; .5 SOLUTION RESPIRATORY (INHALATION) at 20:11

## 2024-01-01 RX ADMIN — PSYLLIUM HUSK 1 PACKET: 3.4 POWDER ORAL at 20:51

## 2024-01-01 RX ADMIN — RUGBY ZINC OXIDE 20% 1 APPLICATION: 20 OINTMENT TOPICAL at 21:53

## 2024-01-01 RX ADMIN — ALBUMIN (HUMAN) 12.5 G: 12.5 SOLUTION INTRAVENOUS at 12:02

## 2024-01-01 RX ADMIN — GABAPENTIN 100 MG: 100 CAPSULE ORAL at 14:34

## 2024-01-01 RX ADMIN — MIDODRINE HYDROCHLORIDE 15 MG: 5 TABLET ORAL at 13:45

## 2024-01-01 RX ADMIN — VASOPRESSIN 0.03 UNITS/MIN: 0.2 INJECTION INTRAVENOUS at 01:22

## 2024-01-01 RX ADMIN — IPRATROPIUM BROMIDE AND ALBUTEROL SULFATE 3 ML: 2.5; .5 SOLUTION RESPIRATORY (INHALATION) at 20:07

## 2024-01-01 RX ADMIN — PANTOPRAZOLE SODIUM 40 MG: 40 TABLET, DELAYED RELEASE ORAL at 08:24

## 2024-01-01 RX ADMIN — ACETAMINOPHEN 1000 MG: 500 TABLET ORAL at 09:16

## 2024-01-01 RX ADMIN — VANCOMYCIN HYDROCHLORIDE 125 MG: 125 CAPSULE ORAL at 12:56

## 2024-01-01 RX ADMIN — NYSTATIN: 100000 POWDER TOPICAL at 21:52

## 2024-01-01 RX ADMIN — CEFEPIME 1 G: 1 INJECTION, POWDER, FOR SOLUTION INTRAMUSCULAR; INTRAVENOUS at 20:47

## 2024-01-01 RX ADMIN — INSULIN LISPRO 2 UNITS: 100 INJECTION, SOLUTION INTRAVENOUS; SUBCUTANEOUS at 18:00

## 2024-01-01 RX ADMIN — IPRATROPIUM BROMIDE AND ALBUTEROL SULFATE 3 ML: 2.5; .5 SOLUTION RESPIRATORY (INHALATION) at 21:30

## 2024-01-01 RX ADMIN — DOXYCYCLINE HYCLATE 100 MG: 100 CAPSULE ORAL at 13:03

## 2024-01-01 RX ADMIN — FLUDROCORTISONE ACETATE 0.1 MG: 0.1 TABLET ORAL at 08:29

## 2024-01-01 RX ADMIN — ALBUMIN (HUMAN) 12.5 G: 12.5 SOLUTION INTRAVENOUS at 13:01

## 2024-01-01 RX ADMIN — NOREPINEPHRINE BITARTRATE 0.09 MCG/KG/MIN: 8 INJECTION, SOLUTION INTRAVENOUS at 15:04

## 2024-01-01 RX ADMIN — DOXYCYCLINE HYCLATE 100 MG: 100 CAPSULE ORAL at 13:24

## 2024-01-01 RX ADMIN — EPINEPHRINE 1 MG: 0.1 INJECTION INTRAVENOUS at 13:37

## 2024-01-01 RX ADMIN — IPRATROPIUM BROMIDE AND ALBUTEROL SULFATE 3 ML: 2.5; .5 SOLUTION RESPIRATORY (INHALATION) at 00:12

## 2024-01-01 RX ADMIN — HEPARIN SODIUM 2000 UNITS: 1000 INJECTION INTRAVENOUS; SUBCUTANEOUS at 16:17

## 2024-01-01 RX ADMIN — ALBUMIN (HUMAN) 12.5 G: 5 SOLUTION INTRAVENOUS at 08:26

## 2024-01-01 RX ADMIN — IPRATROPIUM BROMIDE AND ALBUTEROL SULFATE 3 ML: 2.5; .5 SOLUTION RESPIRATORY (INHALATION) at 20:55

## 2024-01-01 RX ADMIN — NYSTATIN: 100000 CREAM TOPICAL at 21:40

## 2024-01-01 RX ADMIN — INSULIN LISPRO 4 UNITS: 100 INJECTION, SOLUTION INTRAVENOUS; SUBCUTANEOUS at 09:14

## 2024-01-01 RX ADMIN — ACETAMINOPHEN 650 MG: 325 TABLET ORAL at 08:35

## 2024-01-01 RX ADMIN — MIDODRINE HYDROCHLORIDE 15 MG: 5 TABLET ORAL at 12:08

## 2024-01-01 RX ADMIN — IPRATROPIUM BROMIDE AND ALBUTEROL SULFATE 3 ML: 2.5; .5 SOLUTION RESPIRATORY (INHALATION) at 19:47

## 2024-01-01 RX ADMIN — APIXABAN 2.5 MG: 2.5 TABLET, FILM COATED ORAL at 09:02

## 2024-01-01 RX ADMIN — HYDROCORTISONE SODIUM SUCCINATE 50 MG: 100 INJECTION, POWDER, FOR SOLUTION INTRAMUSCULAR; INTRAVENOUS at 08:49

## 2024-01-01 RX ADMIN — DOXYCYCLINE HYCLATE 100 MG: 100 CAPSULE ORAL at 12:04

## 2024-01-01 RX ADMIN — IPRATROPIUM BROMIDE AND ALBUTEROL SULFATE 3 ML: 2.5; .5 SOLUTION RESPIRATORY (INHALATION) at 19:33

## 2024-01-01 RX ADMIN — IPRATROPIUM BROMIDE AND ALBUTEROL SULFATE 3 ML: 2.5; .5 SOLUTION RESPIRATORY (INHALATION) at 11:03

## 2024-01-01 RX ADMIN — ALBUMIN (HUMAN) 12.5 G: 5 SOLUTION INTRAVENOUS at 12:41

## 2024-01-01 RX ADMIN — VANCOMYCIN HYDROCHLORIDE 125 MG: 125 CAPSULE ORAL at 08:11

## 2024-01-01 RX ADMIN — INSULIN GLARGINE 6 UNITS: 100 INJECTION, SOLUTION SUBCUTANEOUS at 21:03

## 2024-01-01 RX ADMIN — PIPERACILLIN SODIUM AND TAZOBACTAM SODIUM 2.25 G: 2; .25 INJECTION, SOLUTION INTRAVENOUS at 20:55

## 2024-01-01 RX ADMIN — SODIUM CHLORIDE SOLN NEBU 3% 3 ML: 3 NEBU SOLN at 11:57

## 2024-01-01 RX ADMIN — NOREPINEPHRINE BITARTRATE 0.19 MCG/KG/MIN: 8 INJECTION, SOLUTION INTRAVENOUS at 06:10

## 2024-01-01 RX ADMIN — ACETAMINOPHEN 650 MG: 325 TABLET ORAL at 16:59

## 2024-01-01 RX ADMIN — VANCOMYCIN HYDROCHLORIDE 500 MG: 250 CAPSULE ORAL at 14:10

## 2024-01-01 RX ADMIN — INSULIN LISPRO 2 UNITS: 100 INJECTION, SOLUTION INTRAVENOUS; SUBCUTANEOUS at 12:00

## 2024-01-01 RX ADMIN — GABAPENTIN 100 MG: 100 CAPSULE ORAL at 15:39

## 2024-01-01 RX ADMIN — FLUDROCORTISONE ACETATE 0.1 MG: 0.1 TABLET ORAL at 17:11

## 2024-01-01 RX ADMIN — COLLAGENASE SANTYL: 250 OINTMENT TOPICAL at 08:52

## 2024-01-01 RX ADMIN — INSULIN LISPRO 4 UNITS: 100 INJECTION, SOLUTION INTRAVENOUS; SUBCUTANEOUS at 13:07

## 2024-01-01 RX ADMIN — HEPARIN SODIUM 2000 UNITS: 1000 INJECTION INTRAVENOUS; SUBCUTANEOUS at 12:45

## 2024-01-01 RX ADMIN — GABAPENTIN 100 MG: 100 CAPSULE ORAL at 16:12

## 2024-01-01 RX ADMIN — EPOETIN ALFA-EPBX 10000 UNITS: 10000 INJECTION, SOLUTION INTRAVENOUS; SUBCUTANEOUS at 15:58

## 2024-01-01 RX ADMIN — MIDODRINE HYDROCHLORIDE 15 MG: 5 TABLET ORAL at 11:46

## 2024-01-01 RX ADMIN — IPRATROPIUM BROMIDE AND ALBUTEROL SULFATE 3 ML: 2.5; .5 SOLUTION RESPIRATORY (INHALATION) at 06:57

## 2024-01-01 RX ADMIN — PIPERACILLIN SODIUM AND TAZOBACTAM SODIUM 2.25 G: 2; .25 INJECTION, SOLUTION INTRAVENOUS at 05:00

## 2024-01-01 RX ADMIN — NYSTATIN: 100000 CREAM TOPICAL at 20:22

## 2024-01-01 RX ADMIN — APIXABAN 2.5 MG: 2.5 TABLET, FILM COATED ORAL at 08:46

## 2024-01-01 RX ADMIN — IPRATROPIUM BROMIDE AND ALBUTEROL SULFATE 3 ML: 2.5; .5 SOLUTION RESPIRATORY (INHALATION) at 07:57

## 2024-01-01 RX ADMIN — GABAPENTIN 100 MG: 100 CAPSULE ORAL at 09:59

## 2024-01-01 RX ADMIN — HYDROCORTISONE SODIUM SUCCINATE 50 MG: 100 INJECTION, POWDER, FOR SOLUTION INTRAMUSCULAR; INTRAVENOUS at 14:10

## 2024-01-01 RX ADMIN — PANTOPRAZOLE SODIUM 40 MG: 40 TABLET, DELAYED RELEASE ORAL at 08:52

## 2024-01-01 RX ADMIN — INSULIN GLARGINE 6 UNITS: 100 INJECTION, SOLUTION SUBCUTANEOUS at 20:46

## 2024-01-01 RX ADMIN — IPRATROPIUM BROMIDE AND ALBUTEROL SULFATE 3 ML: 2.5; .5 SOLUTION RESPIRATORY (INHALATION) at 07:53

## 2024-01-01 RX ADMIN — NOREPINEPHRINE BITARTRATE 0.09 MCG/KG/MIN: 8 INJECTION, SOLUTION INTRAVENOUS at 07:30

## 2024-01-01 RX ADMIN — MIDODRINE HYDROCHLORIDE 15 MG: 5 TABLET ORAL at 12:47

## 2024-01-01 RX ADMIN — ALBUMIN (HUMAN) 12.5 G: 5 SOLUTION INTRAVENOUS at 08:31

## 2024-01-01 RX ADMIN — SODIUM CHLORIDE SOLN NEBU 3% 3 ML: 3 NEBU SOLN at 20:59

## 2024-01-01 RX ADMIN — DOXYCYCLINE HYCLATE 100 MG: 100 CAPSULE ORAL at 12:23

## 2024-01-01 RX ADMIN — IPRATROPIUM BROMIDE AND ALBUTEROL SULFATE 3 ML: 2.5; .5 SOLUTION RESPIRATORY (INHALATION) at 11:09

## 2024-01-01 RX ADMIN — PIPERACILLIN SODIUM AND TAZOBACTAM SODIUM 2.25 G: 2; .25 INJECTION, SOLUTION INTRAVENOUS at 04:36

## 2024-01-01 RX ADMIN — INSULIN LISPRO 2 UNITS: 100 INJECTION, SOLUTION INTRAVENOUS; SUBCUTANEOUS at 16:26

## 2024-01-01 RX ADMIN — NYSTATIN 1 APPLICATION: 100000 POWDER TOPICAL at 21:15

## 2024-01-01 RX ADMIN — IPRATROPIUM BROMIDE AND ALBUTEROL SULFATE 3 ML: 2.5; .5 SOLUTION RESPIRATORY (INHALATION) at 00:01

## 2024-01-01 RX ADMIN — NYSTATIN: 100000 POWDER TOPICAL at 20:09

## 2024-01-01 RX ADMIN — ESCITALOPRAM OXALATE 10 MG: 10 TABLET ORAL at 21:29

## 2024-01-01 RX ADMIN — INSULIN LISPRO 2 UNITS: 100 INJECTION, SOLUTION INTRAVENOUS; SUBCUTANEOUS at 17:36

## 2024-01-01 RX ADMIN — NYSTATIN: 100000 CREAM TOPICAL at 21:30

## 2024-01-01 RX ADMIN — VANCOMYCIN HYDROCHLORIDE 500 MG: 250 CAPSULE ORAL at 11:53

## 2024-01-01 RX ADMIN — IPRATROPIUM BROMIDE AND ALBUTEROL SULFATE 3 ML: 2.5; .5 SOLUTION RESPIRATORY (INHALATION) at 19:11

## 2024-01-01 RX ADMIN — NYSTATIN: 100000 POWDER TOPICAL at 20:13

## 2024-01-01 RX ADMIN — NYSTATIN 1 APPLICATION: 100000 POWDER TOPICAL at 21:00

## 2024-01-01 RX ADMIN — NYSTATIN 1 APPLICATION: 100000 POWDER TOPICAL at 21:53

## 2024-01-01 RX ADMIN — RUGBY ZINC OXIDE 20% 1 APPLICATION: 20 OINTMENT TOPICAL at 10:36

## 2024-01-01 RX ADMIN — NOREPINEPHRINE BITARTRATE 0.07 MCG/KG/MIN: 8 INJECTION, SOLUTION INTRAVENOUS at 09:43

## 2024-01-01 RX ADMIN — MAGNESIUM SULFATE HEPTAHYDRATE 2 G: 40 INJECTION, SOLUTION INTRAVENOUS at 08:43

## 2024-01-01 RX ADMIN — IPRATROPIUM BROMIDE AND ALBUTEROL SULFATE 3 ML: 2.5; .5 SOLUTION RESPIRATORY (INHALATION) at 19:46

## 2024-01-01 RX ADMIN — NYSTATIN: 100000 CREAM TOPICAL at 21:44

## 2024-01-01 RX ADMIN — Medication 2 L/MIN: at 07:44

## 2024-01-01 RX ADMIN — MIDODRINE HYDROCHLORIDE 15 MG: 5 TABLET ORAL at 17:22

## 2024-01-01 RX ADMIN — HEPARIN SODIUM 1600 UNITS: 1000 INJECTION INTRAVENOUS; SUBCUTANEOUS at 21:20

## 2024-01-01 RX ADMIN — GABAPENTIN 100 MG: 100 CAPSULE ORAL at 08:42

## 2024-01-01 RX ADMIN — PSYLLIUM HUSK 1 PACKET: 3.4 POWDER ORAL at 08:32

## 2024-01-01 RX ADMIN — NYSTATIN 1 APPLICATION: 100000 CREAM TOPICAL at 21:53

## 2024-01-01 RX ADMIN — Medication 2 L/MIN: at 11:31

## 2024-01-01 RX ADMIN — NYSTATIN: 100000 POWDER TOPICAL at 21:44

## 2024-01-01 RX ADMIN — ACETAMINOPHEN 1000 MG: 500 TABLET ORAL at 22:00

## 2024-01-01 RX ADMIN — VANCOMYCIN HYDROCHLORIDE 500 MG: 250 CAPSULE ORAL at 13:07

## 2024-01-01 RX ADMIN — VANCOMYCIN HYDROCHLORIDE 125 MG: 125 CAPSULE ORAL at 17:40

## 2024-01-01 RX ADMIN — FLUDROCORTISONE ACETATE 0.1 MG: 0.1 TABLET ORAL at 16:57

## 2024-01-01 RX ADMIN — ESCITALOPRAM OXALATE 10 MG: 10 TABLET ORAL at 20:10

## 2024-01-01 RX ADMIN — NYSTATIN: 100000 POWDER TOPICAL at 20:19

## 2024-01-01 RX ADMIN — IPRATROPIUM BROMIDE AND ALBUTEROL SULFATE 3 ML: 2.5; .5 SOLUTION RESPIRATORY (INHALATION) at 20:20

## 2024-01-01 RX ADMIN — LORAZEPAM 2 MG: 2 INJECTION INTRAMUSCULAR; INTRAVENOUS at 13:54

## 2024-01-01 RX ADMIN — HEPARIN SODIUM 1600 UNITS: 1000 INJECTION INTRAVENOUS; SUBCUTANEOUS at 17:11

## 2024-01-01 RX ADMIN — VANCOMYCIN HYDROCHLORIDE 500 MG: 250 CAPSULE ORAL at 06:11

## 2024-01-01 RX ADMIN — INSULIN LISPRO 4 UNITS: 100 INJECTION, SOLUTION INTRAVENOUS; SUBCUTANEOUS at 08:18

## 2024-01-01 RX ADMIN — NYSTATIN: 100000 CREAM TOPICAL at 20:30

## 2024-01-01 RX ADMIN — DOXYCYCLINE HYCLATE 100 MG: 100 CAPSULE ORAL at 14:04

## 2024-01-01 RX ADMIN — RUGBY ZINC OXIDE 20% 1 APPLICATION: 20 OINTMENT TOPICAL at 21:25

## 2024-01-01 RX ADMIN — FLUDROCORTISONE ACETATE 0.1 MG: 0.1 TABLET ORAL at 21:46

## 2024-01-01 RX ADMIN — NYSTATIN: 100000 CREAM TOPICAL at 09:56

## 2024-01-01 RX ADMIN — VANCOMYCIN HYDROCHLORIDE 125 MG: 125 CAPSULE ORAL at 09:28

## 2024-01-01 RX ADMIN — RUGBY ZINC OXIDE 20% 1 APPLICATION: 20 OINTMENT TOPICAL at 21:46

## 2024-01-01 RX ADMIN — ESCITALOPRAM OXALATE 10 MG: 10 TABLET ORAL at 20:48

## 2024-01-01 RX ADMIN — APIXABAN 2.5 MG: 2.5 TABLET, FILM COATED ORAL at 08:26

## 2024-01-01 RX ADMIN — RUGBY ZINC OXIDE 20% 1 APPLICATION: 20 OINTMENT TOPICAL at 08:28

## 2024-01-01 RX ADMIN — VANCOMYCIN HYDROCHLORIDE 500 MG: 250 CAPSULE ORAL at 16:25

## 2024-01-01 RX ADMIN — INSULIN LISPRO 2 UNITS: 100 INJECTION, SOLUTION INTRAVENOUS; SUBCUTANEOUS at 12:24

## 2024-01-01 RX ADMIN — DOXYCYCLINE HYCLATE 100 MG: 100 CAPSULE ORAL at 13:08

## 2024-01-01 RX ADMIN — IPRATROPIUM BROMIDE AND ALBUTEROL SULFATE 3 ML: 2.5; .5 SOLUTION RESPIRATORY (INHALATION) at 08:46

## 2024-01-01 RX ADMIN — IPRATROPIUM BROMIDE AND ALBUTEROL SULFATE 3 ML: 2.5; .5 SOLUTION RESPIRATORY (INHALATION) at 07:49

## 2024-01-01 RX ADMIN — RUGBY ZINC OXIDE 20% 1 APPLICATION: 20 OINTMENT TOPICAL at 20:27

## 2024-01-01 RX ADMIN — IPRATROPIUM BROMIDE AND ALBUTEROL SULFATE 3 ML: 2.5; .5 SOLUTION RESPIRATORY (INHALATION) at 14:53

## 2024-01-01 RX ADMIN — FLUDROCORTISONE ACETATE 0.1 MG: 0.1 TABLET ORAL at 21:11

## 2024-01-01 RX ADMIN — GABAPENTIN 100 MG: 100 CAPSULE ORAL at 10:05

## 2024-01-01 RX ADMIN — VANCOMYCIN HYDROCHLORIDE 500 MG: 500 INJECTION, POWDER, LYOPHILIZED, FOR SOLUTION INTRAVENOUS at 20:47

## 2024-01-01 RX ADMIN — VANCOMYCIN HYDROCHLORIDE 125 MG: 125 CAPSULE ORAL at 09:47

## 2024-01-01 RX ADMIN — MIDODRINE HYDROCHLORIDE 15 MG: 5 TABLET ORAL at 12:53

## 2024-01-01 RX ADMIN — ACETAMINOPHEN 650 MG: 325 TABLET ORAL at 20:59

## 2024-01-01 RX ADMIN — HEPARIN SODIUM 1600 UNITS: 1000 INJECTION INTRAVENOUS; SUBCUTANEOUS at 15:20

## 2024-01-01 RX ADMIN — IPRATROPIUM BROMIDE AND ALBUTEROL SULFATE 3 ML: 2.5; .5 SOLUTION RESPIRATORY (INHALATION) at 07:26

## 2024-01-01 RX ADMIN — CEFEPIME 1 G: 1 INJECTION, POWDER, FOR SOLUTION INTRAMUSCULAR; INTRAVENOUS at 21:31

## 2024-01-01 RX ADMIN — IPRATROPIUM BROMIDE AND ALBUTEROL SULFATE 3 ML: 2.5; .5 SOLUTION RESPIRATORY (INHALATION) at 12:03

## 2024-01-01 RX ADMIN — VANCOMYCIN HYDROCHLORIDE 125 MG: 125 CAPSULE ORAL at 22:00

## 2024-01-01 RX ADMIN — NYSTATIN 1 APPLICATION: 100000 POWDER TOPICAL at 10:34

## 2024-01-01 RX ADMIN — IPRATROPIUM BROMIDE AND ALBUTEROL SULFATE 3 ML: 2.5; .5 SOLUTION RESPIRATORY (INHALATION) at 11:23

## 2024-01-01 RX ADMIN — FENOFIBRATE 160 MG: 160 TABLET ORAL at 08:23

## 2024-01-01 RX ADMIN — INSULIN LISPRO 2 UNITS: 100 INJECTION, SOLUTION INTRAVENOUS; SUBCUTANEOUS at 13:24

## 2024-01-01 RX ADMIN — SODIUM CHLORIDE SOLN NEBU 3% 3 ML: 3 NEBU SOLN at 15:03

## 2024-01-01 RX ADMIN — INSULIN LISPRO 2 UNITS: 100 INJECTION, SOLUTION INTRAVENOUS; SUBCUTANEOUS at 17:22

## 2024-01-01 RX ADMIN — APIXABAN 2.5 MG: 2.5 TABLET, FILM COATED ORAL at 21:44

## 2024-01-01 RX ADMIN — ALBUMIN (HUMAN) 25 G: 12.5 SOLUTION INTRAVENOUS at 10:33

## 2024-01-01 RX ADMIN — NOREPINEPHRINE BITARTRATE 0.13 MCG/KG/MIN: 8 INJECTION, SOLUTION INTRAVENOUS at 15:06

## 2024-01-01 RX ADMIN — NOREPINEPHRINE BITARTRATE 0.11 MCG/KG/MIN: 8 INJECTION, SOLUTION INTRAVENOUS at 23:39

## 2024-01-01 RX ADMIN — GABAPENTIN 100 MG: 100 CAPSULE ORAL at 08:17

## 2024-01-01 RX ADMIN — APIXABAN 2.5 MG: 2.5 TABLET, FILM COATED ORAL at 08:56

## 2024-01-01 RX ADMIN — MIDODRINE HYDROCHLORIDE 15 MG: 5 TABLET ORAL at 09:03

## 2024-01-01 RX ADMIN — NYSTATIN 1 APPLICATION: 100000 POWDER TOPICAL at 21:25

## 2024-01-01 RX ADMIN — GABAPENTIN 100 MG: 100 CAPSULE ORAL at 20:31

## 2024-01-01 RX ADMIN — RUGBY ZINC OXIDE 20% 1 APPLICATION: 20 OINTMENT TOPICAL at 10:10

## 2024-01-01 RX ADMIN — MIDODRINE HYDROCHLORIDE 20 MG: 10 TABLET ORAL at 13:46

## 2024-01-01 RX ADMIN — FLUDROCORTISONE ACETATE 0.1 MG: 0.1 TABLET ORAL at 22:17

## 2024-01-01 RX ADMIN — VANCOMYCIN HYDROCHLORIDE 500 MG: 250 CAPSULE ORAL at 12:38

## 2024-01-01 RX ADMIN — IPRATROPIUM BROMIDE AND ALBUTEROL SULFATE 3 ML: 2.5; .5 SOLUTION RESPIRATORY (INHALATION) at 20:28

## 2024-01-01 RX ADMIN — SODIUM CHLORIDE SOLN NEBU 3% 3 ML: 3 NEBU SOLN at 11:46

## 2024-01-01 RX ADMIN — VANCOMYCIN HYDROCHLORIDE 125 MG: 125 CAPSULE ORAL at 21:28

## 2024-01-01 RX ADMIN — FLUDROCORTISONE ACETATE 0.1 MG: 0.1 TABLET ORAL at 21:34

## 2024-01-01 RX ADMIN — GABAPENTIN 100 MG: 100 CAPSULE ORAL at 16:57

## 2024-01-01 RX ADMIN — NYSTATIN: 100000 POWDER TOPICAL at 21:16

## 2024-01-01 RX ADMIN — MIDODRINE HYDROCHLORIDE 30 MG: 10 TABLET ORAL at 16:32

## 2024-01-01 RX ADMIN — Medication 400 MG: at 08:52

## 2024-01-01 RX ADMIN — APIXABAN 2.5 MG: 2.5 TABLET, FILM COATED ORAL at 21:27

## 2024-01-01 RX ADMIN — GABAPENTIN 100 MG: 100 CAPSULE ORAL at 16:24

## 2024-01-01 RX ADMIN — SODIUM CHLORIDE SOLN NEBU 3% 3 ML: 3 NEBU SOLN at 20:20

## 2024-01-01 RX ADMIN — INSULIN LISPRO 2 UNITS: 100 INJECTION, SOLUTION INTRAVENOUS; SUBCUTANEOUS at 12:21

## 2024-01-01 RX ADMIN — INSULIN LISPRO 2 UNITS: 100 INJECTION, SOLUTION INTRAVENOUS; SUBCUTANEOUS at 11:39

## 2024-01-01 RX ADMIN — SODIUM CHLORIDE SOLN NEBU 3% 3 ML: 3 NEBU SOLN at 19:49

## 2024-01-01 RX ADMIN — GABAPENTIN 100 MG: 100 CAPSULE ORAL at 21:29

## 2024-01-01 RX ADMIN — SODIUM CHLORIDE SOLN NEBU 3% 3 ML: 3 NEBU SOLN at 20:09

## 2024-01-01 RX ADMIN — VANCOMYCIN HYDROCHLORIDE 500 MG: 250 CAPSULE ORAL at 21:24

## 2024-01-01 RX ADMIN — INSULIN LISPRO 2 UNITS: 100 INJECTION, SOLUTION INTRAVENOUS; SUBCUTANEOUS at 12:54

## 2024-01-01 RX ADMIN — HEPARIN SODIUM 1600 UNITS: 1000 INJECTION INTRAVENOUS; SUBCUTANEOUS at 13:30

## 2024-01-01 RX ADMIN — DOXYCYCLINE HYCLATE 100 MG: 100 CAPSULE ORAL at 11:54

## 2024-01-01 RX ADMIN — MIDODRINE HYDROCHLORIDE 15 MG: 5 TABLET ORAL at 12:46

## 2024-01-01 RX ADMIN — DOXYCYCLINE HYCLATE 100 MG: 100 CAPSULE ORAL at 13:44

## 2024-01-01 RX ADMIN — IPRATROPIUM BROMIDE AND ALBUTEROL SULFATE 3 ML: 2.5; .5 SOLUTION RESPIRATORY (INHALATION) at 10:59

## 2024-01-01 RX ADMIN — GABAPENTIN 100 MG: 100 CAPSULE ORAL at 15:31

## 2024-01-01 RX ADMIN — IPRATROPIUM BROMIDE AND ALBUTEROL SULFATE 3 ML: 2.5; .5 SOLUTION RESPIRATORY (INHALATION) at 15:06

## 2024-01-01 RX ADMIN — DOXYCYCLINE HYCLATE 100 MG: 100 CAPSULE ORAL at 12:59

## 2024-01-01 RX ADMIN — RUGBY ZINC OXIDE 20% 1 APPLICATION: 20 OINTMENT TOPICAL at 08:54

## 2024-01-01 RX ADMIN — MIDODRINE HYDROCHLORIDE 20 MG: 10 TABLET ORAL at 09:48

## 2024-01-01 RX ADMIN — FLUDROCORTISONE ACETATE 0.1 MG: 0.1 TABLET ORAL at 20:31

## 2024-01-01 RX ADMIN — RUGBY ZINC OXIDE 20% 1 APPLICATION: 20 OINTMENT TOPICAL at 21:26

## 2024-01-01 RX ADMIN — NYSTATIN: 100000 POWDER TOPICAL at 20:35

## 2024-01-01 RX ADMIN — NYSTATIN: 100000 CREAM TOPICAL at 08:58

## 2024-01-01 RX ADMIN — VANCOMYCIN HYDROCHLORIDE 125 MG: 125 CAPSULE ORAL at 08:57

## 2024-01-01 RX ADMIN — ACETYLCYSTEINE 600 MG: 200 INHALANT RESPIRATORY (INHALATION) at 11:34

## 2024-01-01 RX ADMIN — VANCOMYCIN 1 G: 1 INJECTION, SOLUTION INTRAVENOUS at 18:22

## 2024-01-01 RX ADMIN — IPRATROPIUM BROMIDE AND ALBUTEROL SULFATE 3 ML: 2.5; .5 SOLUTION RESPIRATORY (INHALATION) at 07:59

## 2024-01-01 RX ADMIN — NYSTATIN: 100000 POWDER TOPICAL at 11:29

## 2024-01-01 RX ADMIN — IPRATROPIUM BROMIDE AND ALBUTEROL SULFATE 3 ML: 2.5; .5 SOLUTION RESPIRATORY (INHALATION) at 07:12

## 2024-01-01 RX ADMIN — HEPARIN SODIUM 1600 UNITS: 1000 INJECTION INTRAVENOUS; SUBCUTANEOUS at 18:22

## 2024-01-01 RX ADMIN — SODIUM CHLORIDE SOLN NEBU 3% 3 ML: 3 NEBU SOLN at 19:28

## 2024-01-01 RX ADMIN — GABAPENTIN 100 MG: 100 CAPSULE ORAL at 15:54

## 2024-01-01 RX ADMIN — PIPERACILLIN SODIUM AND TAZOBACTAM SODIUM 2.25 G: 2; .25 INJECTION, SOLUTION INTRAVENOUS at 20:14

## 2024-01-01 RX ADMIN — APIXABAN 2.5 MG: 2.5 TABLET, FILM COATED ORAL at 20:47

## 2024-01-01 RX ADMIN — DOXYCYCLINE HYCLATE 100 MG: 100 CAPSULE ORAL at 12:19

## 2024-01-01 RX ADMIN — INSULIN LISPRO 2 UNITS: 100 INJECTION, SOLUTION INTRAVENOUS; SUBCUTANEOUS at 13:36

## 2024-01-01 RX ADMIN — GABAPENTIN 100 MG: 100 CAPSULE ORAL at 21:06

## 2024-01-01 RX ADMIN — SIMVASTATIN 40 MG: 40 TABLET, FILM COATED ORAL at 20:27

## 2024-01-01 RX ADMIN — APIXABAN 2.5 MG: 2.5 TABLET, FILM COATED ORAL at 20:13

## 2024-01-01 RX ADMIN — FLUDROCORTISONE ACETATE 0.1 MG: 0.1 TABLET ORAL at 09:35

## 2024-01-01 RX ADMIN — SODIUM CHLORIDE SOLN NEBU 3% 3 ML: 3 NEBU SOLN at 11:37

## 2024-01-01 RX ADMIN — EPOETIN ALFA-EPBX 10000 UNITS: 10000 INJECTION, SOLUTION INTRAVENOUS; SUBCUTANEOUS at 12:17

## 2024-01-01 RX ADMIN — INSULIN LISPRO 2 UNITS: 100 INJECTION, SOLUTION INTRAVENOUS; SUBCUTANEOUS at 08:43

## 2024-01-01 RX ADMIN — Medication 1 APPLICATION: at 09:45

## 2024-01-01 RX ADMIN — RUGBY ZINC OXIDE 20% 1 APPLICATION: 20 OINTMENT TOPICAL at 21:19

## 2024-01-01 RX ADMIN — NYSTATIN: 100000 CREAM TOPICAL at 20:57

## 2024-01-01 RX ADMIN — VANCOMYCIN HYDROCHLORIDE 125 MG: 125 CAPSULE ORAL at 20:27

## 2024-01-01 RX ADMIN — GABAPENTIN 100 MG: 100 CAPSULE ORAL at 20:35

## 2024-01-01 RX ADMIN — PANTOPRAZOLE SODIUM 40 MG: 40 INJECTION, POWDER, FOR SOLUTION INTRAVENOUS at 08:58

## 2024-01-01 RX ADMIN — VANCOMYCIN HYDROCHLORIDE 125 MG: 125 CAPSULE ORAL at 08:56

## 2024-01-01 RX ADMIN — MIDODRINE HYDROCHLORIDE 15 MG: 5 TABLET ORAL at 08:24

## 2024-01-01 RX ADMIN — NOREPINEPHRINE BITARTRATE 0.29 MCG/KG/MIN: 8 INJECTION, SOLUTION INTRAVENOUS at 19:31

## 2024-01-01 RX ADMIN — ONDANSETRON 4 MG: 2 INJECTION INTRAMUSCULAR; INTRAVENOUS at 05:21

## 2024-01-01 RX ADMIN — VANCOMYCIN 1 G: 1 INJECTION, SOLUTION INTRAVENOUS at 17:40

## 2024-01-01 RX ADMIN — NOREPINEPHRINE BITARTRATE 0.01 MCG/KG/MIN: 8 INJECTION, SOLUTION INTRAVENOUS at 02:02

## 2024-01-01 RX ADMIN — Medication 500 MG: at 09:30

## 2024-01-01 RX ADMIN — PANTOPRAZOLE SODIUM 40 MG: 40 TABLET, DELAYED RELEASE ORAL at 08:08

## 2024-01-01 RX ADMIN — NYSTATIN: 100000 CREAM TOPICAL at 20:11

## 2024-01-01 RX ADMIN — SODIUM CHLORIDE SOLN NEBU 3% 3 ML: 3 NEBU SOLN at 20:10

## 2024-01-01 RX ADMIN — SODIUM CHLORIDE SOLN NEBU 3% 3 ML: 3 NEBU SOLN at 07:55

## 2024-01-01 RX ADMIN — ALBUMIN (HUMAN) 12.5 G: 12.5 SOLUTION INTRAVENOUS at 11:33

## 2024-01-01 RX ADMIN — NYSTATIN: 100000 POWDER TOPICAL at 20:08

## 2024-01-01 RX ADMIN — MIDODRINE HYDROCHLORIDE 15 MG: 5 TABLET ORAL at 12:13

## 2024-01-01 RX ADMIN — Medication: at 19:46

## 2024-01-01 RX ADMIN — FLUDROCORTISONE ACETATE 0.1 MG: 0.1 TABLET ORAL at 20:10

## 2024-01-01 RX ADMIN — GABAPENTIN 100 MG: 100 CAPSULE ORAL at 09:02

## 2024-01-01 RX ADMIN — GABAPENTIN 100 MG: 100 CAPSULE ORAL at 15:13

## 2024-01-01 RX ADMIN — SODIUM CHLORIDE SOLN NEBU 3% 3 ML: 3 NEBU SOLN at 11:09

## 2024-01-01 RX ADMIN — INSULIN LISPRO 2 UNITS: 100 INJECTION, SOLUTION INTRAVENOUS; SUBCUTANEOUS at 11:54

## 2024-01-01 RX ADMIN — EPOETIN ALFA-EPBX 10000 UNITS: 10000 INJECTION, SOLUTION INTRAVENOUS; SUBCUTANEOUS at 11:27

## 2024-01-01 RX ADMIN — CEFEPIME 1 G: 1 INJECTION, POWDER, FOR SOLUTION INTRAMUSCULAR; INTRAVENOUS at 21:15

## 2024-01-01 RX ADMIN — PIPERACILLIN SODIUM AND TAZOBACTAM SODIUM 2.25 G: 2; .25 INJECTION, SOLUTION INTRAVENOUS at 21:21

## 2024-01-01 RX ADMIN — VANCOMYCIN HYDROCHLORIDE 500 MG: 250 CAPSULE ORAL at 20:12

## 2024-01-01 RX ADMIN — SODIUM CHLORIDE SOLN NEBU 3% 3 ML: 3 NEBU SOLN at 20:27

## 2024-01-01 RX ADMIN — GABAPENTIN 100 MG: 100 CAPSULE ORAL at 15:32

## 2024-01-01 RX ADMIN — GABAPENTIN 100 MG: 100 CAPSULE ORAL at 20:15

## 2024-01-01 RX ADMIN — GABAPENTIN 100 MG: 100 CAPSULE ORAL at 08:52

## 2024-01-01 RX ADMIN — NOREPINEPHRINE BITARTRATE 0.06 MCG/KG/MIN: 8 INJECTION, SOLUTION INTRAVENOUS at 22:47

## 2024-01-01 RX ADMIN — FLUDROCORTISONE ACETATE 0.1 MG: 0.1 TABLET ORAL at 08:47

## 2024-01-01 RX ADMIN — APIXABAN 2.5 MG: 2.5 TABLET, FILM COATED ORAL at 08:42

## 2024-01-01 RX ADMIN — GABAPENTIN 100 MG: 100 CAPSULE ORAL at 08:31

## 2024-01-01 RX ADMIN — IPRATROPIUM BROMIDE AND ALBUTEROL SULFATE 3 ML: 2.5; .5 SOLUTION RESPIRATORY (INHALATION) at 10:39

## 2024-01-01 RX ADMIN — GABAPENTIN 100 MG: 100 CAPSULE ORAL at 08:47

## 2024-01-01 RX ADMIN — NYSTATIN: 100000 CREAM TOPICAL at 10:36

## 2024-01-01 RX ADMIN — FLUDROCORTISONE ACETATE 0.1 MG: 0.1 TABLET ORAL at 14:01

## 2024-01-01 RX ADMIN — APIXABAN 2.5 MG: 2.5 TABLET, FILM COATED ORAL at 09:00

## 2024-01-01 RX ADMIN — ALBUMIN (HUMAN) 12.5 G: 0.25 INJECTION, SOLUTION INTRAVENOUS at 13:15

## 2024-01-01 RX ADMIN — CEFEPIME 1 G: 1 INJECTION, POWDER, FOR SOLUTION INTRAMUSCULAR; INTRAVENOUS at 21:47

## 2024-01-01 RX ADMIN — ACETAMINOPHEN 650 MG: 325 TABLET ORAL at 08:41

## 2024-01-01 RX ADMIN — NYSTATIN: 100000 CREAM TOPICAL at 21:46

## 2024-01-01 RX ADMIN — EPOETIN ALFA-EPBX 10000 UNITS: 10000 INJECTION, SOLUTION INTRAVENOUS; SUBCUTANEOUS at 11:59

## 2024-01-01 RX ADMIN — IPRATROPIUM BROMIDE AND ALBUTEROL SULFATE 3 ML: 2.5; .5 SOLUTION RESPIRATORY (INHALATION) at 19:39

## 2024-01-01 RX ADMIN — RUGBY ZINC OXIDE 20% 1 APPLICATION: 20 OINTMENT TOPICAL at 08:58

## 2024-01-01 RX ADMIN — SODIUM CHLORIDE SOLN NEBU 3% 3 ML: 3 NEBU SOLN at 20:26

## 2024-01-01 RX ADMIN — PSYLLIUM HUSK 1 PACKET: 3.4 POWDER ORAL at 20:06

## 2024-01-01 RX ADMIN — PERFLUTREN 4 ML OF DILUTION: 6.52 INJECTION, SUSPENSION INTRAVENOUS at 14:32

## 2024-01-01 RX ADMIN — PANTOPRAZOLE SODIUM 40 MG: 40 TABLET, DELAYED RELEASE ORAL at 08:46

## 2024-01-01 RX ADMIN — MIDODRINE HYDROCHLORIDE 15 MG: 5 TABLET ORAL at 12:23

## 2024-01-01 RX ADMIN — MIDODRINE HYDROCHLORIDE 20 MG: 10 TABLET ORAL at 08:32

## 2024-01-01 RX ADMIN — MIDODRINE HYDROCHLORIDE 15 MG: 5 TABLET ORAL at 09:35

## 2024-01-01 RX ADMIN — Medication 3 L/MIN: at 11:50

## 2024-01-01 RX ADMIN — ESCITALOPRAM OXALATE 10 MG: 10 TABLET ORAL at 22:00

## 2024-01-01 RX ADMIN — HEPARIN SODIUM 1600 UNITS: 1000 INJECTION INTRAVENOUS; SUBCUTANEOUS at 06:09

## 2024-01-01 RX ADMIN — INSULIN LISPRO 2 UNITS: 100 INJECTION, SOLUTION INTRAVENOUS; SUBCUTANEOUS at 08:44

## 2024-01-01 RX ADMIN — PANTOPRAZOLE SODIUM 40 MG: 40 TABLET, DELAYED RELEASE ORAL at 09:16

## 2024-01-01 RX ADMIN — FLUDROCORTISONE ACETATE 0.1 MG: 0.1 TABLET ORAL at 09:30

## 2024-01-01 RX ADMIN — NYSTATIN: 100000 POWDER TOPICAL at 20:30

## 2024-01-01 RX ADMIN — SODIUM CHLORIDE SOLN NEBU 3% 3 ML: 3 NEBU SOLN at 12:08

## 2024-01-01 RX ADMIN — ACETAMINOPHEN 650 MG: 325 TABLET ORAL at 16:13

## 2024-01-01 RX ADMIN — PANTOPRAZOLE SODIUM 40 MG: 40 TABLET, DELAYED RELEASE ORAL at 09:30

## 2024-01-01 RX ADMIN — PSYLLIUM HUSK 1 PACKET: 3.4 POWDER ORAL at 10:02

## 2024-01-01 RX ADMIN — IPRATROPIUM BROMIDE AND ALBUTEROL SULFATE 3 ML: 2.5; .5 SOLUTION RESPIRATORY (INHALATION) at 20:30

## 2024-01-01 RX ADMIN — DOXYCYCLINE HYCLATE 100 MG: 100 CAPSULE ORAL at 16:59

## 2024-01-01 RX ADMIN — GABAPENTIN 100 MG: 100 CAPSULE ORAL at 20:53

## 2024-01-01 RX ADMIN — POTASSIUM CHLORIDE 20 MEQ: 200 INJECTION, SOLUTION INTRAVENOUS at 11:53

## 2024-01-01 RX ADMIN — MIDODRINE HYDROCHLORIDE 20 MG: 10 TABLET ORAL at 17:03

## 2024-01-01 RX ADMIN — NOREPINEPHRINE BITARTRATE 0.07 MCG/KG/MIN: 8 INJECTION, SOLUTION INTRAVENOUS at 04:05

## 2024-01-01 RX ADMIN — IPRATROPIUM BROMIDE AND ALBUTEROL SULFATE 3 ML: 2.5; .5 SOLUTION RESPIRATORY (INHALATION) at 11:42

## 2024-01-01 RX ADMIN — HEPARIN SODIUM 1600 UNITS: 1000 INJECTION INTRAVENOUS; SUBCUTANEOUS at 16:13

## 2024-01-01 RX ADMIN — FLUDROCORTISONE ACETATE 0.1 MG: 0.1 TABLET ORAL at 08:12

## 2024-01-01 RX ADMIN — MIDODRINE HYDROCHLORIDE 15 MG: 5 TABLET ORAL at 16:34

## 2024-01-01 RX ADMIN — ESCITALOPRAM OXALATE 10 MG: 10 TABLET ORAL at 21:46

## 2024-01-01 RX ADMIN — GABAPENTIN 100 MG: 100 CAPSULE ORAL at 14:59

## 2024-01-01 RX ADMIN — GABAPENTIN 100 MG: 100 CAPSULE ORAL at 17:01

## 2024-01-01 RX ADMIN — VANCOMYCIN HYDROCHLORIDE 125 MG: 125 CAPSULE ORAL at 09:19

## 2024-01-01 RX ADMIN — DOXYCYCLINE HYCLATE 100 MG: 100 CAPSULE ORAL at 08:49

## 2024-01-01 RX ADMIN — GABAPENTIN 100 MG: 100 CAPSULE ORAL at 20:50

## 2024-01-01 RX ADMIN — RUGBY ZINC OXIDE 20% 1 APPLICATION: 20 OINTMENT TOPICAL at 21:11

## 2024-01-01 RX ADMIN — ACETAMINOPHEN 650 MG: 325 TABLET ORAL at 20:56

## 2024-01-01 RX ADMIN — HEPARIN SODIUM 1600 UNITS: 1000 INJECTION INTRAVENOUS; SUBCUTANEOUS at 18:28

## 2024-01-01 RX ADMIN — MIDODRINE HYDROCHLORIDE 15 MG: 5 TABLET ORAL at 17:55

## 2024-01-01 RX ADMIN — INSULIN LISPRO 2 UNITS: 100 INJECTION, SOLUTION INTRAVENOUS; SUBCUTANEOUS at 09:38

## 2024-01-01 RX ADMIN — MIDODRINE HYDROCHLORIDE 15 MG: 5 TABLET ORAL at 08:59

## 2024-01-01 RX ADMIN — MIDODRINE HYDROCHLORIDE 15 MG: 5 TABLET ORAL at 11:41

## 2024-01-01 RX ADMIN — NYSTATIN 1 APPLICATION: 100000 POWDER TOPICAL at 09:42

## 2024-01-01 RX ADMIN — IPRATROPIUM BROMIDE AND ALBUTEROL SULFATE 3 ML: 2.5; .5 SOLUTION RESPIRATORY (INHALATION) at 08:12

## 2024-01-01 RX ADMIN — COLLAGENASE SANTYL: 250 OINTMENT TOPICAL at 09:00

## 2024-01-01 RX ADMIN — ESCITALOPRAM OXALATE 10 MG: 10 TABLET ORAL at 20:53

## 2024-01-01 RX ADMIN — POTASSIUM CHLORIDE 20 MEQ: 200 INJECTION, SOLUTION INTRAVENOUS at 13:57

## 2024-01-01 RX ADMIN — VANCOMYCIN HYDROCHLORIDE 125 MG: 125 CAPSULE ORAL at 20:46

## 2024-01-01 RX ADMIN — DOXYCYCLINE HYCLATE 100 MG: 100 CAPSULE ORAL at 14:01

## 2024-01-01 RX ADMIN — FLUDROCORTISONE ACETATE 0.1 MG: 0.1 TABLET ORAL at 09:54

## 2024-01-01 RX ADMIN — ACETYLCYSTEINE 600 MG: 200 INHALANT RESPIRATORY (INHALATION) at 15:39

## 2024-01-01 RX ADMIN — NYSTATIN: 100000 POWDER TOPICAL at 21:00

## 2024-01-01 RX ADMIN — HEPARIN SODIUM 1600 UNITS: 1000 INJECTION INTRAVENOUS; SUBCUTANEOUS at 15:19

## 2024-01-01 RX ADMIN — VANCOMYCIN HYDROCHLORIDE 125 MG: 125 CAPSULE ORAL at 20:19

## 2024-01-01 SDOH — SOCIAL STABILITY: SOCIAL INSECURITY: HAVE YOU HAD THOUGHTS OF HARMING ANYONE ELSE?: YES

## 2024-01-01 SDOH — SOCIAL STABILITY: SOCIAL INSECURITY: HAS ANYONE EVER THREATENED TO HURT YOUR FAMILY OR YOUR PETS?: NO

## 2024-01-01 SDOH — SOCIAL STABILITY: SOCIAL INSECURITY: DOES ANYONE TRY TO KEEP YOU FROM HAVING/CONTACTING OTHER FRIENDS OR DOING THINGS OUTSIDE YOUR HOME?: NO

## 2024-01-01 SDOH — SOCIAL STABILITY: SOCIAL INSECURITY: DO YOU FEEL UNSAFE GOING BACK TO THE PLACE WHERE YOU ARE LIVING?: NO

## 2024-01-01 SDOH — SOCIAL STABILITY: SOCIAL INSECURITY: ABUSE: ADULT

## 2024-01-01 SDOH — SOCIAL STABILITY: SOCIAL INSECURITY: WERE YOU ABLE TO COMPLETE ALL THE BEHAVIORAL HEALTH SCREENINGS?: YES

## 2024-01-01 SDOH — SOCIAL STABILITY: SOCIAL INSECURITY: ARE YOU OR HAVE YOU BEEN THREATENED OR ABUSED PHYSICALLY, EMOTIONALLY, OR SEXUALLY BY ANYONE?: NO

## 2024-01-01 SDOH — SOCIAL STABILITY: SOCIAL INSECURITY: ARE THERE ANY APPARENT SIGNS OF INJURIES/BEHAVIORS THAT COULD BE RELATED TO ABUSE/NEGLECT?: NO

## 2024-01-01 SDOH — SOCIAL STABILITY: SOCIAL INSECURITY: DO YOU FEEL ANYONE HAS EXPLOITED OR TAKEN ADVANTAGE OF YOU FINANCIALLY OR OF YOUR PERSONAL PROPERTY?: NO

## 2024-01-01 ASSESSMENT — COGNITIVE AND FUNCTIONAL STATUS - GENERAL
MOBILITY SCORE: 6
MOVING TO AND FROM BED TO CHAIR: TOTAL
MOVING FROM LYING ON BACK TO SITTING ON SIDE OF FLAT BED WITH BEDRAILS: TOTAL
WALKING IN HOSPITAL ROOM: TOTAL
WALKING IN HOSPITAL ROOM: TOTAL
MOVING FROM LYING ON BACK TO SITTING ON SIDE OF FLAT BED WITH BEDRAILS: TOTAL
TURNING FROM BACK TO SIDE WHILE IN FLAT BAD: TOTAL
CLIMB 3 TO 5 STEPS WITH RAILING: TOTAL
MOBILITY SCORE: 6
EATING MEALS: A LOT
PERSONAL GROOMING: A LITTLE
DAILY ACTIVITIY SCORE: 7
DRESSING REGULAR UPPER BODY CLOTHING: A LOT
WALKING IN HOSPITAL ROOM: TOTAL
WALKING IN HOSPITAL ROOM: TOTAL
EATING MEALS: A LOT
CLIMB 3 TO 5 STEPS WITH RAILING: TOTAL
TOILETING: TOTAL
MOVING FROM LYING ON BACK TO SITTING ON SIDE OF FLAT BED WITH BEDRAILS: TOTAL
MOVING TO AND FROM BED TO CHAIR: TOTAL
DAILY ACTIVITIY SCORE: 11
EATING MEALS: A LOT
STANDING UP FROM CHAIR USING ARMS: TOTAL
TOILETING: TOTAL
DRESSING REGULAR UPPER BODY CLOTHING: TOTAL
CLIMB 3 TO 5 STEPS WITH RAILING: TOTAL
DAILY ACTIVITIY SCORE: 7
PERSONAL GROOMING: TOTAL
DRESSING REGULAR UPPER BODY CLOTHING: TOTAL
DRESSING REGULAR UPPER BODY CLOTHING: A LOT
DAILY ACTIVITIY SCORE: 9
WALKING IN HOSPITAL ROOM: TOTAL
WALKING IN HOSPITAL ROOM: TOTAL
MOVING FROM LYING ON BACK TO SITTING ON SIDE OF FLAT BED WITH BEDRAILS: A LOT
DRESSING REGULAR LOWER BODY CLOTHING: TOTAL
MOVING FROM LYING ON BACK TO SITTING ON SIDE OF FLAT BED WITH BEDRAILS: TOTAL
HELP NEEDED FOR BATHING: TOTAL
PERSONAL GROOMING: A LOT
MOVING FROM LYING ON BACK TO SITTING ON SIDE OF FLAT BED WITH BEDRAILS: TOTAL
WALKING IN HOSPITAL ROOM: TOTAL
CLIMB 3 TO 5 STEPS WITH RAILING: TOTAL
MOVING TO AND FROM BED TO CHAIR: TOTAL
MOVING FROM LYING ON BACK TO SITTING ON SIDE OF FLAT BED WITH BEDRAILS: TOTAL
MOVING TO AND FROM BED TO CHAIR: TOTAL
DRESSING REGULAR LOWER BODY CLOTHING: TOTAL
DRESSING REGULAR UPPER BODY CLOTHING: TOTAL
DRESSING REGULAR LOWER BODY CLOTHING: TOTAL
DAILY ACTIVITIY SCORE: 9
MOVING FROM LYING ON BACK TO SITTING ON SIDE OF FLAT BED WITH BEDRAILS: TOTAL
DRESSING REGULAR LOWER BODY CLOTHING: TOTAL
WALKING IN HOSPITAL ROOM: TOTAL
TURNING FROM BACK TO SIDE WHILE IN FLAT BAD: TOTAL
HELP NEEDED FOR BATHING: TOTAL
STANDING UP FROM CHAIR USING ARMS: TOTAL
DRESSING REGULAR LOWER BODY CLOTHING: TOTAL
EATING MEALS: A LOT
PERSONAL GROOMING: A LOT
DRESSING REGULAR UPPER BODY CLOTHING: TOTAL
MOBILITY SCORE: 6
TOILETING: TOTAL
MOBILITY SCORE: 6
DAILY ACTIVITIY SCORE: 6
MOVING TO AND FROM BED TO CHAIR: TOTAL
MOVING TO AND FROM BED TO CHAIR: A LOT
DRESSING REGULAR UPPER BODY CLOTHING: TOTAL
MOBILITY SCORE: 6
EATING MEALS: A LITTLE
STANDING UP FROM CHAIR USING ARMS: TOTAL
EATING MEALS: A LITTLE
TURNING FROM BACK TO SIDE WHILE IN FLAT BAD: TOTAL
STANDING UP FROM CHAIR USING ARMS: TOTAL
PERSONAL GROOMING: A LITTLE
TURNING FROM BACK TO SIDE WHILE IN FLAT BAD: TOTAL
HELP NEEDED FOR BATHING: A LOT
TURNING FROM BACK TO SIDE WHILE IN FLAT BAD: TOTAL
STANDING UP FROM CHAIR USING ARMS: TOTAL
TOILETING: TOTAL
TOILETING: TOTAL
DRESSING REGULAR LOWER BODY CLOTHING: TOTAL
HELP NEEDED FOR BATHING: A LOT
PERSONAL GROOMING: A LOT
DRESSING REGULAR UPPER BODY CLOTHING: TOTAL
MOVING TO AND FROM BED TO CHAIR: TOTAL
DRESSING REGULAR LOWER BODY CLOTHING: TOTAL
WALKING IN HOSPITAL ROOM: TOTAL
STANDING UP FROM CHAIR USING ARMS: TOTAL
DRESSING REGULAR LOWER BODY CLOTHING: TOTAL
DRESSING REGULAR UPPER BODY CLOTHING: TOTAL
PERSONAL GROOMING: TOTAL
TOILETING: TOTAL
TURNING FROM BACK TO SIDE WHILE IN FLAT BAD: TOTAL
DRESSING REGULAR UPPER BODY CLOTHING: A LOT
TOILETING: TOTAL
WALKING IN HOSPITAL ROOM: TOTAL
MOBILITY SCORE: 10
EATING MEALS: A LOT
EATING MEALS: TOTAL
DAILY ACTIVITIY SCORE: 6
STANDING UP FROM CHAIR USING ARMS: TOTAL
MOBILITY SCORE: 6
DRESSING REGULAR UPPER BODY CLOTHING: TOTAL
TURNING FROM BACK TO SIDE WHILE IN FLAT BAD: TOTAL
TURNING FROM BACK TO SIDE WHILE IN FLAT BAD: TOTAL
HELP NEEDED FOR BATHING: TOTAL
MOBILITY SCORE: 6
MOVING FROM LYING ON BACK TO SITTING ON SIDE OF FLAT BED WITH BEDRAILS: A LOT
DAILY ACTIVITIY SCORE: 7
TOILETING: TOTAL
MOVING TO AND FROM BED TO CHAIR: TOTAL
DAILY ACTIVITIY SCORE: 7
HELP NEEDED FOR BATHING: TOTAL
EATING MEALS: A LOT
DAILY ACTIVITIY SCORE: 13
DRESSING REGULAR LOWER BODY CLOTHING: TOTAL
STANDING UP FROM CHAIR USING ARMS: TOTAL
MOBILITY SCORE: 8
HELP NEEDED FOR BATHING: TOTAL
DRESSING REGULAR LOWER BODY CLOTHING: TOTAL
MOVING TO AND FROM BED TO CHAIR: TOTAL
WALKING IN HOSPITAL ROOM: TOTAL
EATING MEALS: A LITTLE
DRESSING REGULAR UPPER BODY CLOTHING: TOTAL
TURNING FROM BACK TO SIDE WHILE IN FLAT BAD: A LOT
DRESSING REGULAR LOWER BODY CLOTHING: TOTAL
WALKING IN HOSPITAL ROOM: TOTAL
DRESSING REGULAR UPPER BODY CLOTHING: TOTAL
CLIMB 3 TO 5 STEPS WITH RAILING: TOTAL
DRESSING REGULAR LOWER BODY CLOTHING: TOTAL
TOILETING: TOTAL
HELP NEEDED FOR BATHING: TOTAL
HELP NEEDED FOR BATHING: TOTAL
MOBILITY SCORE: 6
STANDING UP FROM CHAIR USING ARMS: TOTAL
CLIMB 3 TO 5 STEPS WITH RAILING: TOTAL
TOILETING: TOTAL
CLIMB 3 TO 5 STEPS WITH RAILING: TOTAL
MOVING FROM LYING ON BACK TO SITTING ON SIDE OF FLAT BED WITH BEDRAILS: TOTAL
CLIMB 3 TO 5 STEPS WITH RAILING: TOTAL
CLIMB 3 TO 5 STEPS WITH RAILING: TOTAL
TOILETING: A LOT
MOBILITY SCORE: 6
DRESSING REGULAR LOWER BODY CLOTHING: TOTAL
MOVING TO AND FROM BED TO CHAIR: TOTAL
TOILETING: TOTAL
STANDING UP FROM CHAIR USING ARMS: TOTAL
HELP NEEDED FOR BATHING: A LOT
MOVING TO AND FROM BED TO CHAIR: TOTAL
MOVING TO AND FROM BED TO CHAIR: TOTAL
STANDING UP FROM CHAIR USING ARMS: TOTAL
CLIMB 3 TO 5 STEPS WITH RAILING: TOTAL
TOILETING: TOTAL
MOVING FROM LYING ON BACK TO SITTING ON SIDE OF FLAT BED WITH BEDRAILS: A LOT
PERSONAL GROOMING: A LITTLE
MOBILITY SCORE: 6
EATING MEALS: A LITTLE
CLIMB 3 TO 5 STEPS WITH RAILING: TOTAL
WALKING IN HOSPITAL ROOM: TOTAL
PERSONAL GROOMING: TOTAL
STANDING UP FROM CHAIR USING ARMS: TOTAL
EATING MEALS: A LITTLE
DRESSING REGULAR LOWER BODY CLOTHING: TOTAL
HELP NEEDED FOR BATHING: TOTAL
MOVING FROM LYING ON BACK TO SITTING ON SIDE OF FLAT BED WITH BEDRAILS: TOTAL
MOVING TO AND FROM BED TO CHAIR: TOTAL
STANDING UP FROM CHAIR USING ARMS: TOTAL
EATING MEALS: A LOT
DRESSING REGULAR LOWER BODY CLOTHING: TOTAL
DAILY ACTIVITIY SCORE: 7
WALKING IN HOSPITAL ROOM: TOTAL
PERSONAL GROOMING: TOTAL
EATING MEALS: TOTAL
MOVING TO AND FROM BED TO CHAIR: TOTAL
CLIMB 3 TO 5 STEPS WITH RAILING: TOTAL
PERSONAL GROOMING: A LOT
DRESSING REGULAR LOWER BODY CLOTHING: TOTAL
CLIMB 3 TO 5 STEPS WITH RAILING: TOTAL
TOILETING: TOTAL
HELP NEEDED FOR BATHING: TOTAL
HELP NEEDED FOR BATHING: TOTAL
CLIMB 3 TO 5 STEPS WITH RAILING: TOTAL
CLIMB 3 TO 5 STEPS WITH RAILING: TOTAL
MOVING FROM LYING ON BACK TO SITTING ON SIDE OF FLAT BED WITH BEDRAILS: A LITTLE
TURNING FROM BACK TO SIDE WHILE IN FLAT BAD: TOTAL
PERSONAL GROOMING: TOTAL
MOBILITY SCORE: 8
WALKING IN HOSPITAL ROOM: TOTAL
MOVING FROM LYING ON BACK TO SITTING ON SIDE OF FLAT BED WITH BEDRAILS: TOTAL
DAILY ACTIVITIY SCORE: 8
TOILETING: TOTAL
MOVING TO AND FROM BED TO CHAIR: TOTAL
MOVING TO AND FROM BED TO CHAIR: TOTAL
WALKING IN HOSPITAL ROOM: TOTAL
DRESSING REGULAR LOWER BODY CLOTHING: TOTAL
PERSONAL GROOMING: A LOT
HELP NEEDED FOR BATHING: TOTAL
CLIMB 3 TO 5 STEPS WITH RAILING: TOTAL
DRESSING REGULAR UPPER BODY CLOTHING: A LOT
DAILY ACTIVITIY SCORE: 10
DAILY ACTIVITIY SCORE: 13
DRESSING REGULAR LOWER BODY CLOTHING: TOTAL
EATING MEALS: A LITTLE
TOILETING: TOTAL
MOVING FROM LYING ON BACK TO SITTING ON SIDE OF FLAT BED WITH BEDRAILS: TOTAL
PERSONAL GROOMING: A LITTLE
STANDING UP FROM CHAIR USING ARMS: TOTAL
PERSONAL GROOMING: A LITTLE
DRESSING REGULAR UPPER BODY CLOTHING: TOTAL
STANDING UP FROM CHAIR USING ARMS: TOTAL
WALKING IN HOSPITAL ROOM: TOTAL
MOVING TO AND FROM BED TO CHAIR: TOTAL
MOBILITY SCORE: 6
TOILETING: TOTAL
PATIENT BASELINE BEDBOUND: YES
HELP NEEDED FOR BATHING: TOTAL
TURNING FROM BACK TO SIDE WHILE IN FLAT BAD: TOTAL
DRESSING REGULAR UPPER BODY CLOTHING: TOTAL
WALKING IN HOSPITAL ROOM: TOTAL
MOVING FROM LYING ON BACK TO SITTING ON SIDE OF FLAT BED WITH BEDRAILS: TOTAL
DRESSING REGULAR UPPER BODY CLOTHING: TOTAL
DRESSING REGULAR LOWER BODY CLOTHING: TOTAL
DRESSING REGULAR UPPER BODY CLOTHING: A LOT
TURNING FROM BACK TO SIDE WHILE IN FLAT BAD: TOTAL
MOVING FROM LYING ON BACK TO SITTING ON SIDE OF FLAT BED WITH BEDRAILS: TOTAL
TURNING FROM BACK TO SIDE WHILE IN FLAT BAD: TOTAL
EATING MEALS: A LITTLE
MOBILITY SCORE: 6
DAILY ACTIVITIY SCORE: 7
EATING MEALS: A LITTLE
DRESSING REGULAR UPPER BODY CLOTHING: TOTAL
MOVING TO AND FROM BED TO CHAIR: TOTAL
STANDING UP FROM CHAIR USING ARMS: TOTAL
DRESSING REGULAR UPPER BODY CLOTHING: TOTAL
HELP NEEDED FOR BATHING: TOTAL
TOILETING: TOTAL
MOVING FROM LYING ON BACK TO SITTING ON SIDE OF FLAT BED WITH BEDRAILS: TOTAL
DRESSING REGULAR UPPER BODY CLOTHING: A LOT
TURNING FROM BACK TO SIDE WHILE IN FLAT BAD: TOTAL
PERSONAL GROOMING: TOTAL
MOBILITY SCORE: 6
CLIMB 3 TO 5 STEPS WITH RAILING: TOTAL
MOBILITY SCORE: 7
WALKING IN HOSPITAL ROOM: TOTAL
HELP NEEDED FOR BATHING: TOTAL
TURNING FROM BACK TO SIDE WHILE IN FLAT BAD: TOTAL
MOBILITY SCORE: 6
DAILY ACTIVITIY SCORE: 9
STANDING UP FROM CHAIR USING ARMS: TOTAL
CLIMB 3 TO 5 STEPS WITH RAILING: TOTAL
WALKING IN HOSPITAL ROOM: TOTAL
TURNING FROM BACK TO SIDE WHILE IN FLAT BAD: A LOT
TOILETING: TOTAL
EATING MEALS: A LOT
STANDING UP FROM CHAIR USING ARMS: TOTAL
MOVING TO AND FROM BED TO CHAIR: TOTAL
DAILY ACTIVITIY SCORE: 7
MOBILITY SCORE: 8
TURNING FROM BACK TO SIDE WHILE IN FLAT BAD: A LOT
TURNING FROM BACK TO SIDE WHILE IN FLAT BAD: TOTAL
CLIMB 3 TO 5 STEPS WITH RAILING: TOTAL
MOVING FROM LYING ON BACK TO SITTING ON SIDE OF FLAT BED WITH BEDRAILS: TOTAL
TURNING FROM BACK TO SIDE WHILE IN FLAT BAD: TOTAL
PERSONAL GROOMING: TOTAL
HELP NEEDED FOR BATHING: A LOT
PERSONAL GROOMING: TOTAL
DRESSING REGULAR LOWER BODY CLOTHING: TOTAL
DAILY ACTIVITIY SCORE: 12
PERSONAL GROOMING: A LOT
DRESSING REGULAR UPPER BODY CLOTHING: TOTAL
CLIMB 3 TO 5 STEPS WITH RAILING: TOTAL
STANDING UP FROM CHAIR USING ARMS: TOTAL
HELP NEEDED FOR BATHING: TOTAL
TOILETING: TOTAL
HELP NEEDED FOR BATHING: TOTAL
PERSONAL GROOMING: TOTAL
STANDING UP FROM CHAIR USING ARMS: TOTAL
TURNING FROM BACK TO SIDE WHILE IN FLAT BAD: A LOT
STANDING UP FROM CHAIR USING ARMS: TOTAL
PERSONAL GROOMING: TOTAL
TURNING FROM BACK TO SIDE WHILE IN FLAT BAD: TOTAL
TOILETING: TOTAL
EATING MEALS: A LOT
PERSONAL GROOMING: TOTAL
MOVING TO AND FROM BED TO CHAIR: TOTAL
MOVING TO AND FROM BED TO CHAIR: TOTAL
DAILY ACTIVITIY SCORE: 13
DRESSING REGULAR LOWER BODY CLOTHING: TOTAL
TOILETING: TOTAL
HELP NEEDED FOR BATHING: TOTAL
MOVING FROM LYING ON BACK TO SITTING ON SIDE OF FLAT BED WITH BEDRAILS: TOTAL
DRESSING REGULAR LOWER BODY CLOTHING: TOTAL
EATING MEALS: A LOT
HELP NEEDED FOR BATHING: TOTAL
CLIMB 3 TO 5 STEPS WITH RAILING: TOTAL
MOVING FROM LYING ON BACK TO SITTING ON SIDE OF FLAT BED WITH BEDRAILS: A LOT
MOBILITY SCORE: 6
WALKING IN HOSPITAL ROOM: TOTAL
DAILY ACTIVITIY SCORE: 7
DAILY ACTIVITIY SCORE: 9

## 2024-01-01 ASSESSMENT — PAIN SCALES - GENERAL
PAINLEVEL_OUTOF10: 0 - NO PAIN
PAINLEVEL_OUTOF10: 3
PAINLEVEL_OUTOF10: 0 - NO PAIN
PAINLEVEL_OUTOF10: 3
PAINLEVEL_OUTOF10: 0 - NO PAIN
PAINLEVEL_OUTOF10: 5 - MODERATE PAIN
PAINLEVEL_OUTOF10: 0 - NO PAIN
PAINLEVEL_OUTOF10: 5 - MODERATE PAIN
PAINLEVEL_OUTOF10: 0 - NO PAIN
PAINLEVEL_OUTOF10: 2
PAINLEVEL_OUTOF10: 0 - NO PAIN
PAINLEVEL_OUTOF10: 2
PAINLEVEL_OUTOF10: 0 - NO PAIN
PAINLEVEL_OUTOF10: 8
PAINLEVEL_OUTOF10: 0 - NO PAIN
PAINLEVEL_OUTOF10: 7
PAINLEVEL_OUTOF10: 0 - NO PAIN
PAINLEVEL_OUTOF10: 6
PAINLEVEL_OUTOF10: 0 - NO PAIN
PAINLEVEL_OUTOF10: 8
PAINLEVEL_OUTOF10: 0 - NO PAIN

## 2024-01-01 ASSESSMENT — PAIN - FUNCTIONAL ASSESSMENT
PAIN_FUNCTIONAL_ASSESSMENT: 0-10
PAIN_FUNCTIONAL_ASSESSMENT: 0-10
PAIN_FUNCTIONAL_ASSESSMENT: FLACC (FACE, LEGS, ACTIVITY, CRY, CONSOLABILITY)
PAIN_FUNCTIONAL_ASSESSMENT: 0-10
PAIN_FUNCTIONAL_ASSESSMENT: FLACC (FACE, LEGS, ACTIVITY, CRY, CONSOLABILITY)
PAIN_FUNCTIONAL_ASSESSMENT: 0-10
PAIN_FUNCTIONAL_ASSESSMENT: FLACC (FACE, LEGS, ACTIVITY, CRY, CONSOLABILITY)
PAIN_FUNCTIONAL_ASSESSMENT: 0-10
PAIN_FUNCTIONAL_ASSESSMENT: FLACC (FACE, LEGS, ACTIVITY, CRY, CONSOLABILITY)
PAIN_FUNCTIONAL_ASSESSMENT: FLACC (FACE, LEGS, ACTIVITY, CRY, CONSOLABILITY)
PAIN_FUNCTIONAL_ASSESSMENT: 0-10
PAIN_FUNCTIONAL_ASSESSMENT: FLACC (FACE, LEGS, ACTIVITY, CRY, CONSOLABILITY)
PAIN_FUNCTIONAL_ASSESSMENT: 0-10
PAIN_FUNCTIONAL_ASSESSMENT: FLACC (FACE, LEGS, ACTIVITY, CRY, CONSOLABILITY)
PAIN_FUNCTIONAL_ASSESSMENT: 0-10
PAIN_FUNCTIONAL_ASSESSMENT: FLACC (FACE, LEGS, ACTIVITY, CRY, CONSOLABILITY)
PAIN_FUNCTIONAL_ASSESSMENT: 0-10
PAIN_FUNCTIONAL_ASSESSMENT: FLACC (FACE, LEGS, ACTIVITY, CRY, CONSOLABILITY)
PAIN_FUNCTIONAL_ASSESSMENT: 0-10
PAIN_FUNCTIONAL_ASSESSMENT: FLACC (FACE, LEGS, ACTIVITY, CRY, CONSOLABILITY)
PAIN_FUNCTIONAL_ASSESSMENT: 0-10
PAIN_FUNCTIONAL_ASSESSMENT: FLACC (FACE, LEGS, ACTIVITY, CRY, CONSOLABILITY)
PAIN_FUNCTIONAL_ASSESSMENT: FLACC (FACE, LEGS, ACTIVITY, CRY, CONSOLABILITY)
PAIN_FUNCTIONAL_ASSESSMENT: 0-10
PAIN_FUNCTIONAL_ASSESSMENT: FLACC (FACE, LEGS, ACTIVITY, CRY, CONSOLABILITY)
PAIN_FUNCTIONAL_ASSESSMENT: 0-10
PAIN_FUNCTIONAL_ASSESSMENT: 0-10
PAIN_FUNCTIONAL_ASSESSMENT: FLACC (FACE, LEGS, ACTIVITY, CRY, CONSOLABILITY)
PAIN_FUNCTIONAL_ASSESSMENT: 0-10
PAIN_FUNCTIONAL_ASSESSMENT: FLACC (FACE, LEGS, ACTIVITY, CRY, CONSOLABILITY)
PAIN_FUNCTIONAL_ASSESSMENT: 0-10
PAIN_FUNCTIONAL_ASSESSMENT: FLACC (FACE, LEGS, ACTIVITY, CRY, CONSOLABILITY)
PAIN_FUNCTIONAL_ASSESSMENT: 0-10
PAIN_FUNCTIONAL_ASSESSMENT: FLACC (FACE, LEGS, ACTIVITY, CRY, CONSOLABILITY)
PAIN_FUNCTIONAL_ASSESSMENT: 0-10
PAIN_FUNCTIONAL_ASSESSMENT: 0-10
PAIN_FUNCTIONAL_ASSESSMENT: FLACC (FACE, LEGS, ACTIVITY, CRY, CONSOLABILITY)
PAIN_FUNCTIONAL_ASSESSMENT: 0-10
PAIN_FUNCTIONAL_ASSESSMENT: FLACC (FACE, LEGS, ACTIVITY, CRY, CONSOLABILITY)
PAIN_FUNCTIONAL_ASSESSMENT: 0-10
PAIN_FUNCTIONAL_ASSESSMENT: 0-10
PAIN_FUNCTIONAL_ASSESSMENT: FLACC (FACE, LEGS, ACTIVITY, CRY, CONSOLABILITY)
PAIN_FUNCTIONAL_ASSESSMENT: 0-10
PAIN_FUNCTIONAL_ASSESSMENT: 0-10
PAIN_FUNCTIONAL_ASSESSMENT: FLACC (FACE, LEGS, ACTIVITY, CRY, CONSOLABILITY)
PAIN_FUNCTIONAL_ASSESSMENT: 0-10
PAIN_FUNCTIONAL_ASSESSMENT: NO/DENIES PAIN
PAIN_FUNCTIONAL_ASSESSMENT: FLACC (FACE, LEGS, ACTIVITY, CRY, CONSOLABILITY)
PAIN_FUNCTIONAL_ASSESSMENT: 0-10
PAIN_FUNCTIONAL_ASSESSMENT: FLACC (FACE, LEGS, ACTIVITY, CRY, CONSOLABILITY)
PAIN_FUNCTIONAL_ASSESSMENT: FLACC (FACE, LEGS, ACTIVITY, CRY, CONSOLABILITY)
PAIN_FUNCTIONAL_ASSESSMENT: 0-10
PAIN_FUNCTIONAL_ASSESSMENT: FLACC (FACE, LEGS, ACTIVITY, CRY, CONSOLABILITY)
PAIN_FUNCTIONAL_ASSESSMENT: 0-10
PAIN_FUNCTIONAL_ASSESSMENT: FLACC (FACE, LEGS, ACTIVITY, CRY, CONSOLABILITY)
PAIN_FUNCTIONAL_ASSESSMENT: FLACC (FACE, LEGS, ACTIVITY, CRY, CONSOLABILITY)
PAIN_FUNCTIONAL_ASSESSMENT: 0-10
PAIN_FUNCTIONAL_ASSESSMENT: FLACC (FACE, LEGS, ACTIVITY, CRY, CONSOLABILITY)
PAIN_FUNCTIONAL_ASSESSMENT: 0-10
PAIN_FUNCTIONAL_ASSESSMENT: FLACC (FACE, LEGS, ACTIVITY, CRY, CONSOLABILITY)
PAIN_FUNCTIONAL_ASSESSMENT: FLACC (FACE, LEGS, ACTIVITY, CRY, CONSOLABILITY)
PAIN_FUNCTIONAL_ASSESSMENT: 0-10
PAIN_FUNCTIONAL_ASSESSMENT: FLACC (FACE, LEGS, ACTIVITY, CRY, CONSOLABILITY)
PAIN_FUNCTIONAL_ASSESSMENT: 0-10
PAIN_FUNCTIONAL_ASSESSMENT: FLACC (FACE, LEGS, ACTIVITY, CRY, CONSOLABILITY)
PAIN_FUNCTIONAL_ASSESSMENT: 0-10
PAIN_FUNCTIONAL_ASSESSMENT: FLACC (FACE, LEGS, ACTIVITY, CRY, CONSOLABILITY)
PAIN_FUNCTIONAL_ASSESSMENT: FLACC (FACE, LEGS, ACTIVITY, CRY, CONSOLABILITY)
PAIN_FUNCTIONAL_ASSESSMENT: 0-10

## 2024-01-01 ASSESSMENT — ENCOUNTER SYMPTOMS
CONSTIPATION: 0
TREMORS: 0
SLEEP DISTURBANCE: 0
CONSTIPATION: 0
DIARRHEA: 1
SHORTNESS OF BREATH: 1
NAUSEA: 0
HEMATURIA: 0
HEADACHES: 0
DIZZINESS: 0
DIFFICULTY URINATING: 0
WOUND: 1
FATIGUE: 1
ENDOCRINE NEGATIVE: 1
FEVER: 1
HEADACHES: 0
FEVER: 0
PALPITATIONS: 0
AGITATION: 0
VOMITING: 0
AGITATION: 0
COUGH: 0
VOMITING: 0
HEADACHES: 0
DIZZINESS: 0
HEMATOLOGIC/LYMPHATIC NEGATIVE: 1
POLYDIPSIA: 0
ALLERGIC/IMMUNOLOGIC NEGATIVE: 1
ABDOMINAL PAIN: 0
NERVOUS/ANXIOUS: 0
DIARRHEA: 1
DIZZINESS: 0
SORE THROAT: 0
COUGH: 0
SHORTNESS OF BREATH: 0
PALPITATIONS: 0
ARTHRALGIAS: 0
CONFUSION: 1
VOMITING: 0
AGITATION: 0
FEVER: 0
PSYCHIATRIC NEGATIVE: 1
WEAKNESS: 1
COLOR CHANGE: 0
BACK PAIN: 0
HEADACHES: 0
DIARRHEA: 0
WEAKNESS: 0
SHORTNESS OF BREATH: 1
NERVOUS/ANXIOUS: 0
DIFFICULTY URINATING: 0
SORE THROAT: 0
FREQUENCY: 0
DIARRHEA: 0
SEIZURES: 0
ABDOMINAL DISTENTION: 0
FREQUENCY: 0
DIFFICULTY URINATING: 0
SEIZURES: 0
SHORTNESS OF BREATH: 0
APPETITE CHANGE: 1
DIZZINESS: 0
UNEXPECTED WEIGHT CHANGE: 0
SORE THROAT: 0
COUGH: 0
NERVOUS/ANXIOUS: 0
COUGH: 0
NUMBNESS: 1
SEIZURES: 0
MYALGIAS: 1
ARTHRALGIAS: 1
FREQUENCY: 0
ARTHRALGIAS: 1
EYE PAIN: 0
CONSTIPATION: 0
PALPITATIONS: 0
PALPITATIONS: 0
APPETITE CHANGE: 1
NAUSEA: 0
ABDOMINAL PAIN: 0
EYES NEGATIVE: 1
FATIGUE: 1
MYALGIAS: 0
WOUND: 1
BACK PAIN: 0
ARTHRALGIAS: 1
FEVER: 0
TROUBLE SWALLOWING: 1
FEVER: 0
NERVOUS/ANXIOUS: 0
SORE THROAT: 0
FEVER: 1
SHORTNESS OF BREATH: 0
ACTIVITY CHANGE: 0
BACK PAIN: 1
CHILLS: 0
NAUSEA: 0
ABDOMINAL PAIN: 0
CARDIOVASCULAR NEGATIVE: 1
WOUND: 0
CONSTIPATION: 0
UNEXPECTED WEIGHT CHANGE: 0
SINUS PAIN: 0
NAUSEA: 0
DIARRHEA: 0
ABDOMINAL PAIN: 0
DIARRHEA: 0
UNEXPECTED WEIGHT CHANGE: 0
BACK PAIN: 0
POLYPHAGIA: 0
SEIZURES: 0
DIFFICULTY URINATING: 0
LIGHT-HEADEDNESS: 1

## 2024-01-01 ASSESSMENT — PAIN SCALES - WONG BAKER

## 2024-01-01 ASSESSMENT — ACTIVITIES OF DAILY LIVING (ADL)
FEEDING YOURSELF: NEEDS ASSISTANCE
HEARING - LEFT EAR: FUNCTIONAL
ADEQUATE_TO_COMPLETE_ADL: YES
LACK_OF_TRANSPORTATION: NO
HEARING - RIGHT EAR: FUNCTIONAL
WALKS IN HOME: DEPENDENT
GROOMING: DEPENDENT
DRESSING YOURSELF: DEPENDENT
LACK_OF_TRANSPORTATION: NO
PATIENT'S MEMORY ADEQUATE TO SAFELY COMPLETE DAILY ACTIVITIES?: NO
HOME_MANAGEMENT_TIME_ENTRY: 12
HOME_MANAGEMENT_TIME_ENTRY: 38
ADL_ASSISTANCE: NEEDS ASSISTANCE
HOME_MANAGEMENT_TIME_ENTRY: 16
BATHING: DEPENDENT
ASSISTIVE_DEVICE: OTHER (COMMENT)
TOILETING: DEPENDENT
JUDGMENT_ADEQUATE_SAFELY_COMPLETE_DAILY_ACTIVITIES: NO

## 2024-01-01 ASSESSMENT — COLUMBIA-SUICIDE SEVERITY RATING SCALE - C-SSRS
6. HAVE YOU EVER DONE ANYTHING, STARTED TO DO ANYTHING, OR PREPARED TO DO ANYTHING TO END YOUR LIFE?: NO
1. IN THE PAST MONTH, HAVE YOU WISHED YOU WERE DEAD OR WISHED YOU COULD GO TO SLEEP AND NOT WAKE UP?: NO
2. HAVE YOU ACTUALLY HAD ANY THOUGHTS OF KILLING YOURSELF?: NO

## 2024-01-01 ASSESSMENT — LIFESTYLE VARIABLES
AUDIT-C TOTAL SCORE: 0
HOW MANY STANDARD DRINKS CONTAINING ALCOHOL DO YOU HAVE ON A TYPICAL DAY: PATIENT DOES NOT DRINK
AUDIT-C TOTAL SCORE: 0
HOW OFTEN DO YOU HAVE A DRINK CONTAINING ALCOHOL: NEVER
HOW OFTEN DO YOU HAVE 6 OR MORE DRINKS ON ONE OCCASION: NEVER
SUBSTANCE_ABUSE_PAST_12_MONTHS: NO
SKIP TO QUESTIONS 9-10: 1
PRESCIPTION_ABUSE_PAST_12_MONTHS: NO

## 2024-01-01 ASSESSMENT — PAIN DESCRIPTION - DESCRIPTORS
DESCRIPTORS: ACHING;BURNING
DESCRIPTORS: PATIENT UNABLE TO DESCRIBE
DESCRIPTORS: ACHING
DESCRIPTORS: SHARP

## 2024-01-01 ASSESSMENT — PAIN DESCRIPTION - LOCATION: LOCATION: GENERALIZED

## 2024-01-01 ASSESSMENT — PATIENT HEALTH QUESTIONNAIRE - PHQ9
2. FEELING DOWN, DEPRESSED OR HOPELESS: NOT AT ALL
1. LITTLE INTEREST OR PLEASURE IN DOING THINGS: NOT AT ALL
SUM OF ALL RESPONSES TO PHQ9 QUESTIONS 1 & 2: 0

## 2024-01-01 NOTE — PROGRESS NOTES
Ayanna Gross is a 73 y.o. female on day 26 of admission presenting with Hypotension, unspecified hypotension type.    Subjective   Interval History:    room not entered- Limit exposure  Patient discussed with nurse   had about 500 cc of liquid stool  Remains off pressors        Review of Systems   as above  Objective   Range of Vitals (last 24 hours)  Heart Rate:  [77-99]   Temp:  [35.4 °C (95.7 °F)-36.9 °C (98.4 °F)]   Resp:  [9-29]   Weight:  [70.3 kg (154 lb 15.7 oz)]   SpO2:  [95 %-100 %]   Daily Weight  01/01/24 : 70.3 kg (154 lb 15.7 oz)    Body mass index is 28.34 kg/m².    Physical Exam   resting comfortably    Antibiotics  sodium chloride 0.9 % bolus 500 mL  cefepime (Maxipime) 1 g in dextrose 5 % 50 mL IV  vancomycin-diluent combo no.1 (Xellia) IVPB 1 g  apixaban (Eliquis) tablet 2.5 mg  calcium acetate (Phoslo) capsule 2,000 mg  escitalopram (Lexapro) tablet 10 mg  febuxostat (Uloric) tablet 40 mg  fenofibrate (Triglide) tablet 160 mg  gabapentin (Neurontin) capsule 300 mg  metoprolol succinate XL (Toprol-XL) 24 hr tablet 12.5 mg  midodrine (Proamatine) tablet 10 mg  nystatin (Mycostatin) 100,000 unit/gram powder 1 Application  B complex-vitamin C tablet 1 tablet  simvastatin (Zocor) tablet 40 mg  acetaminophen (Tylenol) tablet 650 mg  acetaminophen (Tylenol) tablet 650 mg  polyethylene glycol (Glycolax, Miralax) packet 17 g  dextrose 50 % injection 25 g  glucagon (Glucagen) injection 1 mg  dextrose 10 % in water (D10W) infusion  insulin lispro (HumaLOG) injection 0-5 Units  zinc oxide 20 % ointment 1 Application  calcium acetate (Phoslo) capsule 667 mg  vancomycin (Vancocin) capsule 125 mg  lidocaine PF (Xylocaine) 10 mg/mL (1 %) injection  heparin 1,000 unit/mL injection 2,000 Units  heparin 1,000 unit/mL injection 2,000 Units  midodrine (Proamatine) tablet 15 mg  albumin human 25 % solution 25 g  polyethylene glycol (Glycolax, Miralax) packet 17 g  collagenase 250 unit/gram ointment  vancomycin  (Vancocin) 1,750 mg in dextrose 5 % in water (D5W) 250 mL IV  vancomycin-diluent combo no.1 (Xellia) IVPB 1,750 mg  albumin human 25 % solution 12.5 g  heparin 1,000 unit/mL injection 2,000 Units  heparin 1,000 unit/mL injection 2,000 Units  vancomycin (Vancocin) placeholder  doxycycline (Vibramycin) capsule 100 mg  potassium chloride CR (Klor-Con) ER tablet 10 mEq  gabapentin (Neurontin) capsule 100 mg  heparin 1,000 unit/mL injection 2,000 Units  heparin 1,000 unit/mL injection 2,000 Units  piperacillin-tazobactam-dextrose (Zosyn) IV 2.25 g  doxycycline (Vibramycin) capsule 100 mg  ipratropium-albuteroL (Duo-Neb) 0.5-2.5 mg/3 mL nebulizer solution 3 mL  heparin 1,000 unit/mL injection 2,000 Units  heparin 1,000 unit/mL injection 2,000 Units  ipratropium-albuteroL (Duo-Neb) 0.5-2.5 mg/3 mL nebulizer solution 3 mL  dexAMETHasone (Decadron) injection 6 mg  albumin human 25 % solution 25 g  epoetin di-epbx (Retacrit) injection 10,000 Units  lidocaine PF (Xylocaine) 20 mg/mL (2 %) injection  potassium chloride CR (Klor-Con) ER tablet 10 mEq  albumin human 5 % infusion 25 g  potassium chloride CR (Klor-Con M20) ER tablet 20 mEq  norepinephrine (Levophed) 8 mg in dextrose 5% 250 mL (0.032 mg/mL) infusion (premix)  oxygen (O2) therapy  piperacillin-tazobactam-dextrose (Zosyn) IV 2.25 g  heparin 1,000 unit/mL injection 2,000 Units  heparin 1,000 unit/mL injection 2,000 Units  potassium chloride 20 mEq in 100 mL IV premix  fidaxomicin (Dificid) tablet 200 mg  norepinephrine (Levophed) 8 mg in dextrose 5% 250 mL (0.032 mg/mL) infusion (premix)  heparin 1,000 unit/mL injection 2,000 Units  heparin 1,000 unit/mL injection 2,000 Units  HYDROcodone-acetaminophen (Norco) 5-325 mg per tablet 1 tablet  dextrose 50 % injection 25 g  glucagon (Glucagen) injection 1 mg  dextrose 10 % in water (D10W) infusion  potassium chloride (Klor-Con) packet 20 mEq  potassium chloride 20 mEq in 100 mL IV premix  cosyntropin (Cortrosyn) injection  250 mcg  heparin 1,000 unit/mL injection 2,000 Units  heparin 1,000 unit/mL injection 2,000 Units  heparin 1,000 unit/mL injection 2,000 Units  heparin 1,000 unit/mL injection 2,000 Units  cholestyramine (Questran) 4 gram packet 4 g  potassium phosphate (monobasic) (K-Phos) tablet 500 mg  vancomycin (Vancocin) capsule 125 mg  heparin 1,000 unit/mL injection 2,000 Units  heparin 1,000 unit/mL injection 2,000 Units  hydrocortisone sod succ (PF) (Solu-CORTEF) injection 100 mg  insulin lispro (HumaLOG) injection 0-10 Units  insulin glargine (Lantus) injection 6 Units  hydrocortisone sod succ (PF) (Solu-CORTEF) injection 50 mg  vancomycin (Vancocin) capsule 500 mg      Relevant Results  Labs  Results from last 72 hours   Lab Units 01/01/24 0450 12/31/23 0400 12/30/23  0443   WBC AUTO x10*3/uL 7.8 7.5 8.5   HEMOGLOBIN g/dL 9.7* 9.7* 9.6*   HEMATOCRIT % 29.3* 28.6* 28.6*   PLATELETS AUTO x10*3/uL 271 312 231   NEUTROS PCT AUTO % 75.7 74.5 75.9   LYMPHS PCT AUTO % 16.3 18.5 20.0   MONOS PCT AUTO % 7.1 6.0 2.2   EOS PCT AUTO % 0.1 0.1 0.0     Results from last 72 hours   Lab Units 01/01/24 0450 12/31/23 0400 12/30/23  0443   SODIUM mmol/L 135 130* 134   POTASSIUM mmol/L 3.9 3.8 3.9   CHLORIDE mmol/L 99 94* 97   CO2 mmol/L 26 22* 24   BUN mg/dL 13 24 15   CREATININE mg/dL 2.00* 3.40* 2.70*   GLUCOSE mg/dL 217* 269* 225*   CALCIUM mg/dL 8.5 8.4* 8.2*   ANION GAP mmol/L 10 14 13   EGFR mL/min/1.73m*2 26* 14* 18*   PHOSPHORUS mg/dL 1.9* 3.0 2.9     Results from last 72 hours   Lab Units 01/01/24 0450 12/31/23 0400 12/30/23  0443   ALBUMIN g/dL 2.8* 2.7* 2.5*     Estimated Creatinine Clearance: 23.7 mL/min (A) (by C-G formula based on SCr of 2 mg/dL (H)).  C-Reactive Protein   Date Value Ref Range Status   12/25/2023 5.20 (H) 0.00 - 2.00 mg/dL Final     CRP   Date Value Ref Range Status   06/23/2023 19.9 (H) 0 - 2.0 MG/DL Final     Comment:     Performed at [address] [address]   05/22/2022 1.0 0 -  2.0 MG/DL Final     Comment:     Performed at [address] [address]     Microbiology   reviewed  Imaging  XR chest 1 view    Result Date: 12/26/2023  Interpreted By:  Rikki Patel, STUDY: XR CHEST 1 VIEW; 12/26/2023 12:23 pm   INDICATION: Signs/Symptoms:hypoxia   COMPARISON: 12/19/2023   ACCESSION NUMBER(S): [id number]   ORDERING CLINICIAN: BRITTANI TOLEDO   FINDINGS: The study is limited due to rotation. Dialysis catheter is stable in position. The cardiac silhouette is indeterminate due to the technique. There is interval significant worsening of large left effusion and left lung atelectasis/infiltrates, with complete opacification of the left hemithorax. There is no pneumothorax. Vascular stents are noted in subclavian vessels. Surgical staples are again seen in the left axilla. The osseous structures are unchanged.       Limited study. Interval worsening of large left pleural effusion and left lung infiltrates/atelectasis, with complete opacification of the left hemithorax.   Signed by: Rikki Patel 12/26/2023 12:40 PM Dictation workstation:   [id number]    US thoracentesis    Result Date: 12/20/2023  Interpreted By:  Todd La, STUDY: US THORACENTESIS;  12/15/2023 3:43 pm   INDICATION: Signs/Symptoms:L pleural effusion.   COMPARISON: Chest x-rayDated 12/14/2023.   ACCESSION NUMBER(S): [id number]   ORDERING CLINICIAN: PARIS LUCAS   TECHNIQUE: INTERVENTIONALIST(S): Todd La M.D.   CONSENT: The patient/patient's POA/next of kin was informed of the nature of the proposed procedure. The purposes, alternatives, risks, and benefits were explained and discussed. All questions were answered and consent was obtained.   SEDATION: None   MEDICATION/CONTRAST: No additional   TIME OUT: A time out was performed immediately prior to procedure start with the interventional team, correctly identifying the patient name, date of birth, MRN, procedure, anatomy (including marking of  site and side), patient position, procedure consent form, relevant laboratory and imaging test results, antibiotic administration, safety precautions, and procedure-specific equipment needs.   FINDINGS: The patient was placed in the sitting position.   The pleural space was examined with grey scale ultrasound, and the most accessible fluid identified and marked for thoracentesis.   The skin was prepped and draped in usual manner. Local anesthesia with Lidocaine was administered and a  left-sided thoracentesis was performed.  A 5 [place] One-Step thoracentesis needle/catheter was then placed where marked.  Approximately 600 mL of yellowish colored fluid was removed.  The needle/catheter was then withdrawn.   The patient tolerated the procedure well and there were no immediate complications. Specimen(s) sent to the laboratory and pathology for further evaluation, per the requesting team.       Uneventful  left-sided thoracentesis, as detailed above.   I personally performed and/or directly supervised this study and was present for the entire procedure.   I personally reviewed the study and resident interpretation. I agree with the findings as stated.   Performed and dictated at [facility].   MACRO: None   Signed by: Todd La 12/20/2023 9:48 PM Dictation workstation:   [id number]    XR chest 1 view    Result Date: 12/19/2023  Interpreted By:  Judd Lee, STUDY: XR CHEST 1 VIEW; 12/19/2023 5:44 am   INDICATION: Signs/Symptoms:pleural effusion.   COMPARISON: December 14, 2023   ACCESSION NUMBER(S): [id number]   ORDERING CLINICIAN: PARIS LUCAS   FINDINGS: RESULT: Right sided central venous catheter is again noted extending into the right atrium. The distal tip is not well visualized. Left-sided vascular stents are noted in the left subclavian region. Left axillary surgical clips are noted. The cardiac silhouette is within normal limits for size. Mediastinal contours  are unremarkable. Left basilar opacity obscures the left hemidiaphragm with hazy opacity throughout the left lung. There are degenerative changes of the thoracic spine and shoulders.       Persistent left basilar opacity obscures the left hemidiaphragm with hazy opacity extending through the left hemithorax. Findings likely represent pleural effusion atelectasis. Underlying infiltrate or mass is not excluded. Overall findings not significantly changed compared to previous exam     Signed by: Judd Lee 12/19/2023 9:02 AM Dictation workstation:   [id number]    XR chest 1 view    Result Date: 12/14/2023  Interpreted By:  Judd Lee, STUDY: XR CHEST 1 VIEW; 12/14/2023 10:43 am   INDICATION: Hypoxic respiratory failure.   COMPARISON: December 8, 2023   ACCESSION NUMBER(S): [id number]   ORDERING CLINICIAN: RODRI SURESH   FINDINGS: RESULT: Right-sided double-lumen dialysis catheter is noted with distal tip at the level of the right atrium. Vascular stents are noted in the left subclavian region. Surgical clips are noted in the left axilla. Cardiac silhouette size is indeterminate as the left heart border is obscured. There are calcifications involving the thoracic aorta. Left basilar obscurity obscures the left hemidiaphragm and left heart border with hazy opacity extending to the left mid lung. There are degenerative changes of the bilateral shoulders.       Increasing density in the left mid and lower lung suggests increasing infiltrate or pleural effusion.     Signed by: Judd Lee 12/14/2023 10:58 AM Dictation workstation:   [id number]    Vascular US ankle brachial index (OCTAVIO) without exercise    Result Date: 12/13/2023           [place] [address]            Phone [phone]  Vascular Lab Report  [facility] US ANKLE BRACHIAL INDEX (OCTAVIO) WITHOUT EXERCISE Patient Name:      BASIA Gray Physician: [id number] Messer                                                            MD Study Date:        12/11/2023          Ordering Provider: [id number] MARIANN SUTTON MRN/PID:           [id number]            Fellow: Accession#:        [id number]        Technologist:      Luz Maria Trivedi RVT Date of Birth/Age: 1950 / 73 years Technologist 2:    Dara Parr RVT Gender:            F                   Encounter#:        [id number] Admission Status:  Inpatient           Location           [facility]                                        Performed:  Diagnosis/ICD: Other specified symptoms and signs involving the circulatory and                respiratory systems-R09.89 Indication:    Peripheral vascular disease CPT Codes:     85755.52 Peripheral artery OCTAVIO Only Reduced Service  Pertinent History: Absent pedal pulses. Patient is on dialysis.  CONCLUSIONS: Right Lower PVR: Evidence of moderate arterial occlusive disease in the right lower extremity at rest. Right pressures of >220 mmHg suggest no compressibility of vessels and may make absolute Segmental Limb Pressures (SLP) unreliable. Decreased digital perfusion noted. Biphasic flow is noted in the right dorsalis pedis artery. Left Lower PVR: Evidence of moderate arterial occlusive disease in the left lower extremity at rest. Left pressures of >220 mmHg suggest no compressibility of vessels and may make absolute Segmental Limb Pressures (SLP) unreliable. Decreased digital perfusion noted. Biphasic flow is noted in the left dorsalis pedis artery. Additional Findings: Technically difficult and limited exam due to patient's positioning, movement and inability to cooperate with exam.  Imaging & Doppler Findings:  RIGHT Lower PVR              Pressures Right Dorsalis Pedis (Ankle) 255 mmHg   LEFT Lower PVR              Pressures Left Dorsalis Pedis (Ankle) 255 mmHg   [id number] Lizet Messer MD Electronically signed by [id number] Lizet Messer MD on 12/13/2023 at 8:38:56 AM  ** Final **     CT head wo IV  contrast    Result Date: 12/11/2023  Interpreted By:  Brendon Hook, STUDY: CT HEAD WO IV CONTRAST; 12/11/2023 5:20 pm   INDICATION: Signs/Symptoms:acute confusion.   COMPARISON: 20 August 2023   ACCESSION NUMBER(S): [id number]   ORDERING CLINICIAN: RODRI SURESH   TECHNIQUE: CT was performed with one or more of the following dose reduction techniques: automated exposure control, adjustment of the mA and/or kV according to patient size, or use of iterative reconstruction technique.       PROCEDURE: 3.0 mm axial images were obtained through the brain, to include the posterior fossa without intravenous contrast enhancement.   FINDINGS: The patient's head is turned to the left and slightly canted in the gantry. Mild motion artifact is seen. There is symmetric volume loss of the cerebral hemispheres. Moderate  decreased attenuation in the bilateral periventricular white matter, consistent with microangiopathy is noted.  There is no encephalomalacic change. Brainstem is unremarkable. Cerebellar hemispheres are symmetric. No subarachnoid, intraparenchymal, subdural or interventricular hemorrhage. No intra- or extra-axial mass or abnormal blood products are demonstrated. Hyperostosis frontalis interna is seen. Slight hypoplasia of the left mastoid.   The paranasal sinuses and mastoids as well as calvarium are unremarkable.       1. No acute intracranial findings. 2. Symmetric volume loss of the cerebral hemispheres. 3. Periventricular Microangiopathy. 4. No posttraumatic abnormality.   This report has been produced using speech recognition. This exam is available in DICOM format to non-affiliated healthcare facilities on a secure media free searchable basis with prior patient authorization. The patient exposure is reported to a radiation dose index registry. All CT examinations are performed with one or more of the following dose reduction techniques: Automated Exposure Control, Adjustment of mA and/or KV according to  patient size, or use of iterative reconstruction techniques.   MACRO: None   Signed by: Brendon Hook 12/11/2023 7:05 PM Dictation workstation:   [id number]    US thoracentesis    Result Date: 12/8/2023  Interpreted By:  Sandro Hernandez, STUDY: US THORACENTESIS; 12/7/2023 3:16 pm   INDICATION: Signs/Symptoms:Left pleural effusion question of whether this needs thoracentesis, history of ESRD, just had dialysis today.   COMPARISON: None   ACCESSION NUMBER(S): [id number]   ORDERING CLINICIAN: JEZ LONG   TECHNIQUE: Informed consent obtained. Patient positionedsitting upright. Skin prepped, draped and anesthetized. Under ultrasound guidance, a centesis catheter/needle was advanced into rightpleural cavity.   FINDINGS: A total of 750 cc of clear yellow fluid was aspirated. A sample was sent for analysis. The patient tolerated the procedure well.       Ultrasound-guided left thoracentesis.     Signed by: Sandro Hernandez 12/8/2023 3:01 PM Dictation workstation:   [id number]    XR chest 1 view    Result Date: 12/8/2023  Interpreted By:  Maria Garcia, STUDY: XR CHEST 1 VIEW 12/8/2023 11:57 am   INDICATION: Signs/Symptoms:S/P thoracentesis   COMPARISON: 12/06/2023   ACCESSION NUMBER(S): [id number]   ORDERING CLINICIAN: JEZ LONG   TECHNIQUE: AP erect view of the chest at bedside   FINDINGS: There is an interval decrease in size of left pleural effusion when compared with the study done 2 days earlier. No pneumothorax is seen.   There is some residual left pleural effusion with infiltrates seen in the left mid and lower lung field. The right lung is clear.   There are surgical clips within the left axilla. Stent grafts are visible in the left subclavian region extending into the left axillary region. A tunneled right jugular dialysis catheter terminates within right atrium.       Decreased size of left effusion following ultrasound-guided thoracentesis with no pneumothorax.   There is some residual left pleural  effusion noted with infiltrate in the left mid and lower lung field.   Signed by: Maria Garcia 12/8/2023 12:51 PM Dictation workstation:   [id number]    XR foot 3+ views bilateral    Result Date: 12/7/2023  Interpreted By:  Brendon Hook, STUDY: XR FOOT 3+ VIEWS BILATERAL; 12/7/2023 3:31 pm   INDICATION: Signs/Symptoms:Wound/Abscess/Acute neha process rule out/Comparative Exam.   COMPARISON: None   ACCESSION NUMBER(S): [id number]   ORDERING CLINICIAN: JOSSELYN STAHL   TECHNIQUE: Nonweightbearing AP, lateral and oblique views of the bilateral foot were performed.   FINDINGS: Right foot: Large heel spur is seen. Mild Achilles enthesopathy demonstrated. Moderate atherosclerotic disease is seen. There is suggestion of loss of the arch on this nonweightbearing view. Degenerative change anterior plafond demonstrated and dorsal talus as well as posterior plafond and dorsal talus. Sclerosis anterior calcaneus is seen. Degenerative changes mild between the midfoot and forefoot. 1st metatarsophalangeal joint degenerative changes demonstrated. 2nd ray absent phalanges. Distal phalanges of the 3rd through 5th rays with overlap were difficult to visualize. Distal phalanx of the great toe is difficult to visualize.   Left foot: Large heel spur is seen. Advanced atherosclerotic disease demonstrated. Degenerative change in the calcaneus and talus seen with moderate anterior and mild posterior plafond degenerative change. Mild degenerative change in the midfoot and forefoot is seen. Distal phalanx of the great toe is absent. Degenerative change of the 2nd ray phalanges and 3rd ray phalanges seen with degenerative change of the 4th and 5th digits not well seen.       1. Right foot: Large heel spur. Atherosclerotic disease. Suggestion of diffuse osteopenia. No sclerosis to suggest osteomyelitis. No subcutaneous air to suggest gangrene. Absent flanges 2nd ray with difficult to visualized distal phalanges of the other digits.   2.  Left foot: Large heel spur. Degenerative changes of the hindfoot midfoot and forefoot with atherosclerotic disease. Absent distal phalanx of the great toe. No periostitis or abnormal sclerosis with diffuse osteopenia to suggest osteomyelitis. No subcutaneous air to suggest gangrene.       Signed by: Brendon Hook 12/7/2023 8:39 PM Dictation workstation:   [id number]    ECG 12 Lead    Result Date: 12/7/2023   Poor data quality, interpretation may be adversely affected Normal sinus rhythm Right bundle branch block Septal infarct , age undetermined Possible Lateral infarct , age undetermined Abnormal ECG No previous ECGs available Confirmed by Hammad Callahan ([id number]) on 12/7/2023 1:07:11 PM    XR chest 1 view    Result Date: 12/6/2023  Interpreted By:  Roger Murdock, STUDY: XR CHEST 1 VIEW;  12/6/2023 2:08 pm   INDICATION: Signs/Symptoms:Brief AMS and hypotension, now resolved.   COMPARISON: 08/21/2023..   ACCESSION NUMBER(S): [id number]   ORDERING CLINICIAN: PATEL TINSLEY   FINDINGS: New moderate left pleural effusion. There is silhouetting of the left heart border. Left subclavian stent present. Right-sided double-lumen central venous catheter terminates within the right ventricle. No acute osseous findings.       New, moderate left pleural effusion.     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-completed IV vancomycin, now on Doxycycline-at risk for recurrence-has a right chest wall dialysis catheter  ESRD on hemodialysis  Severe malnutrition-prealbumin of 3.2           Dificid-completed 10  days  Oral vancomycin-duration depends on clinical response-increaseD dose to  500 mg every 6 hours-avoiding Flagyl because of potential interaction with Lexapro  Questran  Oral doxycycline as suppressive therapy for MRSA bacteremia with mitral valve infective endocarditis  Monitor temperature  and WBC  Local care  Offloading  High-protein diet  Monitor stool  Podiatry follow up  Droplet plus precautions-COVID-discontinue 1/3/2024  Contact plus precautions-c.diff    Stephen Coulter MD

## 2024-01-01 NOTE — PROGRESS NOTES
Ayanna Gross is a 73 y.o. female on day 26 of admission presenting with Hypotension, unspecified hypotension type.      Subjective   No acute events, tolerated dialysis and remains off pressors.       Objective          Vitals 24HR  Heart Rate:  []   Temp:  [35.4 °C (95.7 °F)-36.9 °C (98.4 °F)]   Resp:  [9-29]   Weight:  [70.3 kg (154 lb 15.7 oz)]   SpO2:  [95 %-100 %]         Intake/Output last 3 Shifts:    Intake/Output Summary (Last 24 hours) at 1/1/2024 1228  Last data filed at 1/1/2024 0500  Gross per 24 hour   Intake 1440 ml   Output 3000 ml   Net -1560 ml       Physical Exam  Gen: NAD  HENT: atraumatic  Heart: RRR  Lungs: CTA  Abdomen: Soft  Ext; no edema  Neuro: non focal  Psych : AAO x 3    Relevant Results               Assessment/Plan      73-year-old female with end-stage renal disease  Shock possibly septic     -Tolerated dialysis yesterday and has remained off pressors  -Dialysis planned for Wednesday    Ernesto Andrade MD

## 2024-01-01 NOTE — CARE PLAN
Problem: Diabetes  Goal: Achieve decreasing blood glucose levels by end of shift  Outcome: Progressing  Goal: Increase stability of blood glucose readings by end of shift  Outcome: Progressing  Goal: Maintain electrolyte levels within acceptable range throughout shift  Outcome: Progressing  Goal: Maintain glucose levels >70mg/dl to <250mg/dl throughout shift  Outcome: Progressing  Goal: No changes in neurological exam by end of shift  Outcome: Progressing  Goal: Learn about and adhere to nutrition recommendations by end of shift  Outcome: Progressing  Goal: Vital signs within normal range for age by end of shift  Outcome: Progressing  Goal: Increase self care and/or family involovement by end of shift  Outcome: Progressing  Goal: Receive DSME education by end of shift  Outcome: Progressing     Problem: Pain - Adult  Goal: Verbalizes/displays adequate comfort level or baseline comfort level  Outcome: Progressing     Problem: Safety - Adult  Goal: Free from fall injury  Outcome: Progressing     Problem: Discharge Planning  Goal: Discharge to home or other facility with appropriate resources  Outcome: Progressing     Problem: Chronic Conditions and Co-morbidities  Goal: Patient's chronic conditions and co-morbidity symptoms are monitored and maintained or improved  Outcome: Progressing     Problem: Skin  Goal: Decreased wound size/increased tissue granulation at next dressing change  Outcome: Progressing  Goal: Participates in plan/prevention/treatment measures  Outcome: Progressing  Goal: Prevent/manage excess moisture  Outcome: Progressing  Goal: Prevent/minimize sheer/friction injuries  Outcome: Progressing  Goal: Promote/optimize nutrition  Outcome: Progressing  Goal: Promote skin healing  Outcome: Progressing     Problem: Fall/Injury  Goal: Not fall by end of shift  Outcome: Progressing  Goal: Be free from injury by end of the shift  Outcome: Progressing  Goal: Verbalize understanding of personal risk factors  for fall in the hospital  Outcome: Progressing  Goal: Verbalize understanding of risk factor reduction measures to prevent injury from fall in the home  Outcome: Progressing  Goal: Pace activities to prevent fatigue by end of the shift  Outcome: Progressing     Problem: Nutrition  Goal: Oral intake greater than 50%  Outcome: Progressing  Goal: Oral intake greater 75%  Outcome: Progressing  Goal: Consume prescribed supplement  Outcome: Progressing  Goal: Adequate PO fluid intake  Outcome: Progressing  Goal: Nutrition support goals are met within 48 hrs  Outcome: Progressing  Goal: Nutrition support is meeting 75% of nutrient needs  Outcome: Progressing  Goal: Lab values WNL  Outcome: Progressing  Goal: Electrolytes WNL  Outcome: Progressing  Goal: Promote healing  Outcome: Progressing  Goal: Maintain stable weight  Outcome: Progressing  Goal: Reduce weight from edema/fluid  Outcome: Progressing  Goal: Gradual weight gain  Outcome: Progressing     Problem: Respiratory  Goal: Clear secretions with interventions this shift  Outcome: Progressing  Goal: Minimize anxiety/maximize coping throughout shift  Outcome: Progressing  Goal: Minimal/no exertional discomfort or dyspnea this shift  Outcome: Progressing  Goal: No signs of respiratory distress (eg. Use of accessory muscles. Peds grunting)  Outcome: Progressing  Goal: Patent airway maintained this shift  Outcome: Progressing  Goal: Verbalize decreased shortness of breath this shift  Outcome: Progressing     Problem: Pain  Goal: My pain/discomfort is manageable  Outcome: Progressing     Problem: Safety  Goal: Patient will be injury free during hospitalization  Outcome: Progressing  Goal: I will remain free of falls  Outcome: Progressing     Problem: Psychosocial Needs  Goal: Demonstrates ability to cope with hospitalization/illness  Outcome: Progressing  Goal: Collaborate with me, my family, and caregiver to identify my specific goals  Outcome: Progressing     Problem:  Discharge Barriers  Goal: My discharge needs are met  Outcome: Progressing     Problem: Pain  Goal: Takes deep breaths with improved pain control throughout the shift  Outcome: Progressing  Goal: Turns in bed with improved pain control throughout the shift  Outcome: Progressing  Goal: Walks with improved pain control throughout the shift  Outcome: Progressing  Goal: Performs ADL's with improved pain control throughout shift  Outcome: Progressing  Goal: Participates in PT with improved pain control throughout the shift  Outcome: Progressing   The patient's goals for the shift include maintain comfort    The clinical goals for the shift include pt. will remain hemodynamically stable throughout shift

## 2024-01-01 NOTE — PROGRESS NOTES
Ayanna Gross is a 73 y.o. female on day 26 of admission presenting with Hypotension, unspecified hypotension type.      Subjective   Seen in the ICU this AM. Has been off pressors since 12/29. Tolerating dialysis yesterday without needing pressor support. Feels well this morning. Tolerating PO. Denies SOB, chest pain.        Objective     Last Recorded Vitals  BP (!) 88/44   Pulse 85   Temp 35.8 °C (96.4 °F) (Oral)   Resp 13   Wt 70.3 kg (154 lb 15.7 oz)   SpO2 97%   Intake/Output last 3 Shifts:    Intake/Output Summary (Last 24 hours) at 1/1/2024 1036  Last data filed at 1/1/2024 0500  Gross per 24 hour   Intake 1440 ml   Output 3000 ml   Net -1560 ml       Admission Weight  Weight: 72.6 kg (160 lb) (12/06/23 1301)    Daily Weight  01/01/24 : 70.3 kg (154 lb 15.7 oz)    Image Results  XR chest 1 view  Narrative: Interpreted By:  Rikki Patel,   STUDY:  XR CHEST 1 VIEW; 12/26/2023 12:23 pm      INDICATION:  Signs/Symptoms:hypoxia      COMPARISON:  12/19/2023      ACCESSION NUMBER(S):  [id number]      ORDERING CLINICIAN:  BRITTANI TOLEDO      FINDINGS:  The study is limited due to rotation.  Dialysis catheter is stable in position.  The cardiac silhouette is indeterminate due to the technique.  There is interval significant worsening of large left effusion and  left lung atelectasis/infiltrates, with complete opacification of the  left hemithorax. There is no pneumothorax.  Vascular stents are noted in subclavian vessels. Surgical staples are  again seen in the left axilla. The osseous structures are unchanged.      Impression: Limited study. Interval worsening of large left pleural effusion and  left lung infiltrates/atelectasis, with complete opacification of the  left hemithorax.      Signed by: Rikki Patel 12/26/2023 12:40 PM  Dictation workstation:   [id number]      Physical Exam  Constitutional:       General: She is not in acute distress.     Appearance: She is ill-appearing (chronically). She  is not toxic-appearing.   HENT:      Head: Normocephalic and atraumatic.      Nose: Nose normal.      Mouth/Throat:      Mouth: Mucous membranes are moist.      Pharynx: Oropharynx is clear.   Eyes:      General: No scleral icterus.  Cardiovascular:      Rate and Rhythm: Normal rate and regular rhythm.   Pulmonary:      Effort: No respiratory distress.      Breath sounds: No wheezing.   Abdominal:      General: There is no distension.      Palpations: Abdomen is soft.   Genitourinary:     Comments: FMS in place  Musculoskeletal:      Right lower leg: No edema.      Left lower leg: No edema.   Skin:     Comments: B/L heel wounds with bandaging   Neurological:      Mental Status: She is alert.   Psychiatric:         Mood and Affect: Mood normal.         Behavior: Behavior normal.         Relevant Results               Assessment/Plan        This patient has a central line   Reason for the central line remaining today? Line unnecessary, will be removed today      Principal Problem:    Hypotension, unspecified hypotension type  Active Problems:    Paroxysmal atrial fibrillation (CMS/HCC)    Peripheral vascular disease (CMS/HCC)    Orthostatic hypotension    Mixed hyperlipidemia    End-stage renal disease on hemodialysis (CMS/HCC)    DM (diabetes mellitus) (CMS/HCC)    Absent pedal pulses    Septic Shock, resolved  Unstageable bilateral heel ulcers  C. Diff colitis  Hx of MRSA bacteremia, infective endocarditis  COVID19 infection  ID following  Continue suppressive doxycycline for hx of MRSA bacteremia/IE  Continue PO vanc for C. Diff  Wean steroids    Chronic hypotension  Continue midodrine  Keep SBP >70 per nephrology    ESRD  Nephrology following  HD per nephrology    Anemia of chronic disease  Cold agglutinins disease  Heme-onc following  Hgb stable    Pleural effusion   S/p thoracentesis - transudative  Pulm following    PVD  B/L heel ulcers  Follow up with podiatry as outpatient    T2DM  Continue lantus and  SSI    Dispo: stable off pressor support. Transfer to step down today                        Yvonne Madrigal MD

## 2024-01-01 NOTE — CARE PLAN
Problem: Skin  Goal: Decreased wound size/increased tissue granulation at next dressing change  Outcome: Progressing  Flowsheets (Taken 12/31/2023 2035)  Decreased wound size/increased tissue granulation at next dressing change: Protective dressings over bony prominences  Goal: Participates in plan/prevention/treatment measures  Outcome: Progressing  Flowsheets (Taken 12/31/2023 2035)  Participates in plan/prevention/treatment measures: Discuss with provider PT/OT consult  Goal: Prevent/manage excess moisture  Outcome: Progressing  Flowsheets (Taken 12/31/2023 2035)  Prevent/manage excess moisture: Follow provider orders for dressing changes  Goal: Prevent/minimize sheer/friction injuries  Outcome: Progressing  Flowsheets (Taken 12/31/2023 2035)  Prevent/minimize sheer/friction injuries:   HOB 30 degrees or less   Turn/reposition every 2 hours/use positioning/transfer devices  Goal: Promote/optimize nutrition  Outcome: Progressing  Flowsheets (Taken 12/31/2023 2035)  Promote/optimize nutrition: Consume > 50% meals/supplements  Goal: Promote skin healing  Outcome: Progressing  Flowsheets (Taken 12/30/2023 2047 by Mee Wright RN)  Promote skin healing:   Turn/reposition every 2 hours/use positioning/transfer devices   Protective dressings over bony prominences   Rotate device position/do not position patient on device   Assess skin/pad under line(s)/device(s)

## 2024-01-01 NOTE — PROGRESS NOTES
Subjective   Patient ID: Ayanna Gross is a 73 y.o. female who is acute skilled care being seen and evaluated for multiple medical problems.    Alert, pleasant, she is complaining of abdominal discomfort and diarrhea.  No fever noted.  Appetite fair.  She participates in physical therapy and ambulates short distance with walker         Review of Systems   Gastrointestinal:  Positive for diarrhea.       Objective   There were no vitals taken for this visit.    Physical Exam  Vitals reviewed.   Constitutional:       General: She is not in acute distress.  HENT:      Mouth/Throat:      Mouth: Mucous membranes are moist.   Cardiovascular:      Rate and Rhythm: Regular rhythm.      Heart sounds: Normal heart sounds.   Pulmonary:      Breath sounds: Normal breath sounds. No rales.   Abdominal:      Palpations: Abdomen is soft.      Tenderness: There is no abdominal tenderness.   Musculoskeletal:         General: No tenderness.      Cervical back: Neck supple. No tenderness.   Neurological:      Mental Status: She is alert.         Assessment/Plan   Problem List Items Addressed This Visit       Hypertension - Primary    Mixed hyperlipidemia    DM (diabetes mellitus) (CMS/[facility])     Other Visit Diagnoses       Diarrhea of presumed infectious origin                 Goals    None     Will check stool for C. difficile

## 2024-01-02 NOTE — CARE PLAN
Problem: Respiratory  Goal: Clear secretions with interventions this shift  Outcome: Progressing  Goal: Minimize anxiety/maximize coping throughout shift  Outcome: Progressing  Goal: Minimal/no exertional discomfort or dyspnea this shift  Outcome: Progressing  Goal: No signs of respiratory distress (eg. Use of accessory muscles. Peds grunting)  Outcome: Progressing  Goal: Patent airway maintained this shift  Outcome: Progressing  Goal: Verbalize decreased shortness of breath this shift  Outcome: Progressing

## 2024-01-02 NOTE — CARE PLAN
Problem: Diabetes  Goal: Achieve decreasing blood glucose levels by end of shift  Outcome: Progressing  Goal: Increase stability of blood glucose readings by end of shift  Outcome: Progressing  Goal: Maintain electrolyte levels within acceptable range throughout shift  Outcome: Progressing  Goal: Maintain glucose levels >70mg/dl to <250mg/dl throughout shift  Outcome: Progressing  Goal: No changes in neurological exam by end of shift  Outcome: Progressing  Goal: Learn about and adhere to nutrition recommendations by end of shift  Outcome: Progressing  Goal: Vital signs within normal range for age by end of shift  Outcome: Progressing  Goal: Increase self care and/or family involovement by end of shift  Outcome: Progressing  Goal: Receive DSME education by end of shift  Outcome: Progressing     Problem: Pain - Adult  Goal: Verbalizes/displays adequate comfort level or baseline comfort level  Outcome: Progressing     Problem: Safety - Adult  Goal: Free from fall injury  Outcome: Progressing     Problem: Discharge Planning  Goal: Discharge to home or other facility with appropriate resources  Outcome: Progressing     Problem: Chronic Conditions and Co-morbidities  Goal: Patient's chronic conditions and co-morbidity symptoms are monitored and maintained or improved  Outcome: Progressing     Problem: Skin  Goal: Decreased wound size/increased tissue granulation at next dressing change  Outcome: Progressing  Flowsheets (Taken 12/31/2023 2035 by Kevyn Orozco, RN)  Decreased wound size/increased tissue granulation at next dressing change: Protective dressings over bony prominences  Goal: Participates in plan/prevention/treatment measures  Outcome: Progressing  Flowsheets (Taken 12/31/2023 2035 by Kevyn Orozco, RN)  Participates in plan/prevention/treatment measures: Discuss with provider PT/OT consult  Goal: Prevent/manage excess moisture  Outcome: Progressing  Flowsheets (Taken 1/1/2024 1100 by Trish Sawyer  RN)  Prevent/manage excess moisture:   Cleanse incontinence/protect with barrier cream   Follow provider orders for dressing changes  Goal: Prevent/minimize sheer/friction injuries  Outcome: Progressing  Flowsheets (Taken 1/1/2024 1100 by Trish Sawyer RN)  Prevent/minimize sheer/friction injuries:   HOB 30 degrees or less   Turn/reposition every 2 hours/use positioning/transfer devices   Increase activity/out of bed for meals  Goal: Promote/optimize nutrition  Outcome: Progressing  Flowsheets (Taken 1/1/2024 1100 by Trish Sawyer RN)  Promote/optimize nutrition:   Offer water/supplements/favorite foods   Consume > 50% meals/supplements  Goal: Promote skin healing  Outcome: Progressing  Flowsheets (Taken 1/1/2024 1100 by Trish Sawyer RN)  Promote skin healing:   Turn/reposition every 2 hours/use positioning/transfer devices   Protective dressings over bony prominences   Assess skin/pad under line(s)/device(s)     Problem: Fall/Injury  Goal: Not fall by end of shift  Outcome: Progressing  Goal: Be free from injury by end of the shift  Outcome: Progressing  Goal: Verbalize understanding of personal risk factors for fall in the hospital  Outcome: Progressing  Goal: Verbalize understanding of risk factor reduction measures to prevent injury from fall in the home  Outcome: Progressing  Goal: Pace activities to prevent fatigue by end of the shift  Outcome: Progressing     Problem: Nutrition  Goal: Oral intake greater than 50%  Outcome: Progressing  Goal: Oral intake greater 75%  Outcome: Progressing  Goal: Consume prescribed supplement  Outcome: Progressing  Goal: Adequate PO fluid intake  Outcome: Progressing  Goal: Nutrition support goals are met within 48 hrs  Outcome: Progressing  Goal: Nutrition support is meeting 75% of nutrient needs  Outcome: Progressing  Goal: Lab values WNL  Outcome: Progressing  Goal: Electrolytes WNL  Outcome: Progressing  Goal: Promote healing  Outcome:  Progressing  Goal: Maintain stable weight  Outcome: Progressing  Goal: Reduce weight from edema/fluid  Outcome: Progressing  Goal: Gradual weight gain  Outcome: Progressing     Problem: Respiratory  Goal: Clear secretions with interventions this shift  Outcome: Progressing  Goal: Minimize anxiety/maximize coping throughout shift  Outcome: Progressing  Goal: Minimal/no exertional discomfort or dyspnea this shift  Outcome: Progressing  Goal: No signs of respiratory distress (eg. Use of accessory muscles. Peds grunting)  Outcome: Progressing  Goal: Patent airway maintained this shift  Outcome: Progressing  Goal: Verbalize decreased shortness of breath this shift  Outcome: Progressing     Problem: Pain  Goal: My pain/discomfort is manageable  Outcome: Progressing     Problem: Safety  Goal: Patient will be injury free during hospitalization  Outcome: Progressing  Goal: I will remain free of falls  Outcome: Progressing     Problem: Daily Care  Goal: Daily care needs are met  Outcome: Progressing     Problem: Psychosocial Needs  Goal: Demonstrates ability to cope with hospitalization/illness  Outcome: Progressing  Goal: Collaborate with me, my family, and caregiver to identify my specific goals  Outcome: Progressing     Problem: Discharge Barriers  Goal: My discharge needs are met  Outcome: Progressing     Problem: Pain  Goal: Takes deep breaths with improved pain control throughout the shift  Outcome: Progressing  Goal: Turns in bed with improved pain control throughout the shift  Outcome: Progressing  Goal: Walks with improved pain control throughout the shift  Outcome: Progressing  Goal: Performs ADL's with improved pain control throughout shift  Outcome: Progressing  Goal: Participates in PT with improved pain control throughout the shift  Outcome: Progressing   The patient's goals for the shift include maintain comfort    The clinical goals for the shift include remain hemodynamically stable

## 2024-01-02 NOTE — PROGRESS NOTES
Occupational Therapy                 Therapy Communication Note    Patient Name: Ayanna Gross  MRN: [id number]  Today's Date: 1/2/2024     Discipline: Occupational Therapy    Missed Visit Reason: Missed Visit Reason: Patient refused (pt declined therapy this morning. pt requesting therapist return in afternoon.)    Missed Time: Attempt    Comment:

## 2024-01-02 NOTE — CARE PLAN
The patient's goals for the shift include maintain comfort    The clinical goals for the shift include remain hemodynamically stable    Problem: Diabetes  Goal: Achieve decreasing blood glucose levels by end of shift  Outcome: Progressing  Goal: Increase stability of blood glucose readings by end of shift  Outcome: Progressing  Goal: Maintain electrolyte levels within acceptable range throughout shift  Outcome: Progressing  Goal: Maintain glucose levels >70mg/dl to <250mg/dl throughout shift  Outcome: Progressing  Goal: No changes in neurological exam by end of shift  Outcome: Progressing  Goal: Learn about and adhere to nutrition recommendations by end of shift  Outcome: Progressing  Goal: Vital signs within normal range for age by end of shift  Outcome: Progressing  Goal: Increase self care and/or family involovement by end of shift  Outcome: Progressing  Goal: Receive DSME education by end of shift  Outcome: Progressing     Problem: Pain - Adult  Goal: Verbalizes/displays adequate comfort level or baseline comfort level  Outcome: Progressing     Problem: Safety - Adult  Goal: Free from fall injury  Outcome: Progressing     Problem: Discharge Planning  Goal: Discharge to home or other facility with appropriate resources  Outcome: Progressing     Problem: Chronic Conditions and Co-morbidities  Goal: Patient's chronic conditions and co-morbidity symptoms are monitored and maintained or improved  Outcome: Progressing     Problem: Skin  Goal: Decreased wound size/increased tissue granulation at next dressing change  Outcome: Progressing  Flowsheets (Taken 12/31/2023 2035 by Kevyn Orozco RN)  Decreased wound size/increased tissue granulation at next dressing change: Protective dressings over bony prominences  Goal: Participates in plan/prevention/treatment measures  Outcome: Progressing  Flowsheets (Taken 1/2/2024 0930)  Participates in plan/prevention/treatment measures:   Discuss with provider PT/OT consult    Elevate heels   Increase activity/out of bed for meals  Goal: Prevent/manage excess moisture  Outcome: Progressing  Flowsheets (Taken 1/2/2024 0930)  Prevent/manage excess moisture:   Follow provider orders for dressing changes   Monitor for/manage infection if present  Goal: Prevent/minimize sheer/friction injuries  Outcome: Progressing  Flowsheets (Taken 1/2/2024 0930)  Prevent/minimize sheer/friction injuries:   Complete micro-shifts as needed if patient unable. Adjust patient position to relieve pressure points, not a full turn   Increase activity/out of bed for meals   Use pull sheet  Goal: Promote/optimize nutrition  Outcome: Progressing  Flowsheets (Taken 1/2/2024 0930)  Promote/optimize nutrition:   Assist with feeding   Monitor/record intake including meals   Consume > 50% meals/supplements  Goal: Promote skin healing  Outcome: Progressing  Flowsheets (Taken 1/2/2024 0930)  Promote skin healing:   Assess skin/pad under line(s)/device(s)   Protective dressings over bony prominences   Turn/reposition every 2 hours/use positioning/transfer devices     Problem: Fall/Injury  Goal: Not fall by end of shift  Outcome: Progressing  Goal: Be free from injury by end of the shift  Outcome: Progressing  Goal: Verbalize understanding of personal risk factors for fall in the hospital  Outcome: Progressing  Goal: Verbalize understanding of risk factor reduction measures to prevent injury from fall in the home  Outcome: Progressing  Goal: Pace activities to prevent fatigue by end of the shift  Outcome: Progressing     Problem: Nutrition  Goal: Oral intake greater than 50%  Outcome: Progressing  Goal: Oral intake greater 75%  Outcome: Progressing  Goal: Consume prescribed supplement  Outcome: Progressing  Goal: Adequate PO fluid intake  Outcome: Progressing  Goal: Nutrition support goals are met within 48 hrs  Outcome: Progressing  Goal: Nutrition support is meeting 75% of nutrient needs  Outcome: Progressing  Goal: Lab  values WNL  Outcome: Progressing  Goal: Electrolytes WNL  Outcome: Progressing  Goal: Promote healing  Outcome: Progressing  Goal: Maintain stable weight  Outcome: Progressing  Goal: Reduce weight from edema/fluid  Outcome: Progressing  Goal: Gradual weight gain  Outcome: Progressing     Problem: Respiratory  Goal: Clear secretions with interventions this shift  Outcome: Progressing  Goal: Minimize anxiety/maximize coping throughout shift  Outcome: Progressing  Goal: Minimal/no exertional discomfort or dyspnea this shift  Outcome: Progressing  Goal: No signs of respiratory distress (eg. Use of accessory muscles. Peds grunting)  Outcome: Progressing  Goal: Patent airway maintained this shift  Outcome: Progressing  Goal: Verbalize decreased shortness of breath this shift  Outcome: Progressing     Problem: Pain  Goal: My pain/discomfort is manageable  Outcome: Progressing     Problem: Safety  Goal: Patient will be injury free during hospitalization  Outcome: Progressing  Goal: I will remain free of falls  Outcome: Progressing     Problem: Daily Care  Goal: Daily care needs are met  Outcome: Progressing     Problem: Psychosocial Needs  Goal: Demonstrates ability to cope with hospitalization/illness  Outcome: Progressing  Goal: Collaborate with me, my family, and caregiver to identify my specific goals  Outcome: Progressing     Problem: Discharge Barriers  Goal: My discharge needs are met  Outcome: Progressing     Problem: Pain  Goal: Takes deep breaths with improved pain control throughout the shift  Outcome: Progressing  Goal: Turns in bed with improved pain control throughout the shift  Outcome: Progressing  Goal: Walks with improved pain control throughout the shift  Outcome: Progressing  Goal: Performs ADL's with improved pain control throughout shift  Outcome: Progressing  Goal: Participates in PT with improved pain control throughout the shift  Outcome: Progressing

## 2024-01-02 NOTE — PROGRESS NOTES
Ayanna Gross is a 73 y.o. female on day 27 of admission presenting with Hypotension, unspecified hypotension type.      Subjective   No acute events       Objective          Vitals 24HR  Heart Rate:  [80-87]   Temp:  [35.8 °C (96.4 °F)-36.9 °C (98.4 °F)]   Resp:  [14-20]   BP: (54-83)/(29-57)   Weight:  [72.8 kg (160 lb 7.9 oz)]   SpO2:  [98 %-100 %]         Intake/Output last 3 Shifts:  No intake or output data in the 24 hours ending 01/02/24 1526    Physical Exam  No acute distress  Full exam deferred secondary to COVID  Relevant Results               Assessment/Plan              73-year-old female with end-stage renal disease  Shock possibly septic     -Remains off pressors but still with asymptomatic hypotension with blood pressures as low as 70 systolic  -Will continue to attempt hemodialysis with 1 L ultrafiltration, continue midodrine, will continue to use cold dialysate    Ernesto Andrade MD

## 2024-01-02 NOTE — PROGRESS NOTES
Ayanna Gross is a 73 y.o. female on day 27 of admission presenting with Hypotension, unspecified hypotension type.      Subjective   Feels well today. Denies SOB or chest pain. Tolerating PO. Would like to go to rehab       Objective     Last Recorded Vitals  BP 76/56 (BP Location: Right arm, Patient Position: Lying)   Pulse 87   Temp 36.9 °C (98.4 °F) (Temporal)   Resp 19   Wt 72.8 kg (160 lb 7.9 oz)   SpO2 99%   Intake/Output last 3 Shifts:  No intake or output data in the 24 hours ending 01/02/24 0852    Admission Weight  Weight: 72.6 kg (160 lb) (12/06/23 1301)    Daily Weight  01/02/24 : 72.8 kg (160 lb 7.9 oz)    Image Results  XR chest 1 view  Narrative: Interpreted By:  Rikki Patel,   STUDY:  XR CHEST 1 VIEW; 12/26/2023 12:23 pm      INDICATION:  Signs/Symptoms:hypoxia      COMPARISON:  12/19/2023      ACCESSION NUMBER(S):  [id number]      ORDERING CLINICIAN:  BRITTANI TOLEDO      FINDINGS:  The study is limited due to rotation.  Dialysis catheter is stable in position.  The cardiac silhouette is indeterminate due to the technique.  There is interval significant worsening of large left effusion and  left lung atelectasis/infiltrates, with complete opacification of the  left hemithorax. There is no pneumothorax.  Vascular stents are noted in subclavian vessels. Surgical staples are  again seen in the left axilla. The osseous structures are unchanged.      Impression: Limited study. Interval worsening of large left pleural effusion and  left lung infiltrates/atelectasis, with complete opacification of the  left hemithorax.      Signed by: Rikki Patel 12/26/2023 12:40 PM  Dictation workstation:   [id number]      Physical Exam  Constitutional:       General: She is not in acute distress.     Appearance: She is ill-appearing (chronically). She is not toxic-appearing.   HENT:      Head: Normocephalic and atraumatic.      Mouth/Throat:      Mouth: Mucous membranes are moist.      Pharynx: Oropharynx  is clear.   Eyes:      General: No scleral icterus.  Cardiovascular:      Rate and Rhythm: Normal rate and regular rhythm.   Pulmonary:      Effort: No respiratory distress.      Breath sounds: No wheezing.   Abdominal:      General: There is no distension.      Palpations: Abdomen is soft.   Musculoskeletal:      Right lower leg: No edema.      Left lower leg: No edema.   Skin:     Comments: B/L heel wounds with bandaging   Neurological:      Mental Status: She is alert.         Relevant Results               Assessment/Plan        This patient has a central line   Reason for the central line remaining today? Parenteral medication      Principal Problem:    Hypotension, unspecified hypotension type  Active Problems:    Paroxysmal atrial fibrillation (CMS/HCC)    Peripheral vascular disease (CMS/[facility])    Orthostatic hypotension    Mixed hyperlipidemia    End-stage renal disease on hemodialysis (CMS/[facility])    DM (diabetes mellitus) (CMS/[facility])    Absent pedal pulses    Septic Shock, resolved  Unstageable bilateral heel ulcers  C. Diff colitis  Hx of MRSA bacteremia, infective endocarditis  COVID19 infection  ID following  Continue suppressive doxycycline for hx of MRSA bacteremia/IE  Continue PO vanc for C. Diff  Wean steroids     Chronic hypotension  Continue midodrine  Keep SBP >70 per nephrology     ESRD  Nephrology following  HD per nephrology     Anemia of chronic disease  Cold agglutinins disease  Heme-onc following  Hgb stable     Pleural effusion   S/p thoracentesis - transudative  Pulm following     PVD  B/L heel ulcers  Follow up with podiatry as outpatient     T2DM  Continue lantus and SSI     Dispo: step down status. PT/OT. Needs SNF placement              Yvonne Madrigal MD

## 2024-01-02 NOTE — PROGRESS NOTES
"Ayanna Gross is a 73 y.o. female on day 27 of admission presenting with Hypotension, unspecified hypotension type.    Subjective   73-year-old woman past medical history of Asthma, CAD (coronary artery disease), CHF (congestive heart failure) (CMS/[facility]), Chronic kidney disease on chronic dialysis (CMS/[facility]), Hypertension, Sleep apnea, and Type 2 diabetes mellitus without complications admitted with COVID and is on dialysis     Hematology consulted 21 2823 for cold agglutinin disease not thought to be hemolyzing        Objective     Physical Exam  Gen: no distress  Skin: pale    Last Recorded Vitals  Blood pressure 83/57, pulse 84, temperature 36.9 °C (98.4 °F), temperature source Temporal, resp. rate 20, height 1.575 m (5' 2.01\"), weight 72.8 kg (160 lb 7.9 oz), SpO2 100 %.  Intake/Output last 3 Shifts:  I/O last 3 completed shifts:  In: 0 (0 mL/kg)   Out: 600 (8.2 mL/kg) [Stool:600]  Weight: 72.8 kg     Relevant Results                               Assessment/Plan   Principal Problem:    Hypotension, unspecified hypotension type  Active Problems:    Paroxysmal atrial fibrillation (CMS/[facility])    Peripheral vascular disease (CMS/[facility])    Orthostatic hypotension    Mixed hyperlipidemia    End-stage renal disease on hemodialysis (CMS/[facility])    DM (diabetes mellitus) (CMS/[facility])    Absent pedal pulses    Moderate normocytic anemia   Date of onset:12-29-18 .  MCV:93 .  moderate.  Stable.  Associated CBC findings: none. Hgb deficit: NO.   Ferritin: 2,373. Iron saturation: .  TIBC:130 (low, meets criterion for anemia of chronic disease .      Creatinine: 3.   GI bleeding history: no.  GI bleeding symptoms: no.  Colonoscopy: .  EGD: .     Transfusion history: not transfused   Hemolysis panel: reticulocytes: 4.5%.  LDH:141.  Haptoglobin:99.   Nutrition panel: B12:1014. Methylmalonic acid: not done.  Copper level: not done. ,    Inflammation panel: ESR: not done.  Rheumatoid factor: not done.  PAPITO: not done.  CRP: not done. "   Myeloma panel: SPEP/MICKIE: not done.  FLC: not done.     flow cytometry: not done.   TSH: .   Discussion: Although the reticulocyte count is mildly elevated, the normal haptoglobin and LDH rules against there being active hemolysis   Impression: Anemia of chronic disease  Plan:  TSH,: cold agglutinin titer, SPEP, IgM, erythropoietin level  Trend CBC  Transfuse 1 unit packed red cells if hemoglobin less than 7.0 g/dL        I spent 20 minutes in the professional and overall care of this patient.      Delta Lynne MD

## 2024-01-02 NOTE — PROGRESS NOTES
Anticipate discharge soon. Patient step down status. TCC spoke to patient's daughter, Laura, and [facility] is their first choice. Patient no longer qualifies for LTAC. Once patient works with therapy, TCC will request that precert gets started. Will follow.     **PATIENT DOES NOT HAVE A SAFE DISCHARGE PLAN      Shauna Jensen RN

## 2024-01-02 NOTE — PROGRESS NOTES
Ayanna Gross is a 73 y.o. female on day 27 of admission presenting with Hypotension, unspecified hypotension type.    Subjective   Interval History:   Room not entered-limit exposure  Patient observed through glass window  Resting comfortably  Remains off pressors      No diarrhea recorded    Review of Systems    Objective   Range of Vitals (last 24 hours)  Heart Rate:  [82-99]   Temp:  [35.8 °C (96.4 °F)-36.9 °C (98.4 °F)]   Resp:  [17-32]   BP: (54-83)/(29-57)   Weight:  [72.8 kg (160 lb 7.9 oz)]   SpO2:  [98 %-100 %]   Daily Weight  01/02/24 : 72.8 kg (160 lb 7.9 oz)    Body mass index is 29.35 kg/m².    Physical Exam  Awake, alert    Antibiotics  sodium chloride 0.9 % bolus 500 mL  cefepime (Maxipime) 1 g in dextrose 5 % 50 mL IV  vancomycin-diluent combo no.1 (Xellia) IVPB 1 g  apixaban (Eliquis) tablet 2.5 mg  calcium acetate (Phoslo) capsule 2,000 mg  escitalopram (Lexapro) tablet 10 mg  febuxostat (Uloric) tablet 40 mg  fenofibrate (Triglide) tablet 160 mg  gabapentin (Neurontin) capsule 300 mg  metoprolol succinate XL (Toprol-XL) 24 hr tablet 12.5 mg  midodrine (Proamatine) tablet 10 mg  nystatin (Mycostatin) 100,000 unit/gram powder 1 Application  B complex-vitamin C tablet 1 tablet  simvastatin (Zocor) tablet 40 mg  acetaminophen (Tylenol) tablet 650 mg  acetaminophen (Tylenol) tablet 650 mg  polyethylene glycol (Glycolax, Miralax) packet 17 g  dextrose 50 % injection 25 g  glucagon (Glucagen) injection 1 mg  dextrose 10 % in water (D10W) infusion  insulin lispro (HumaLOG) injection 0-5 Units  zinc oxide 20 % ointment 1 Application  calcium acetate (Phoslo) capsule 667 mg  vancomycin (Vancocin) capsule 125 mg  lidocaine PF (Xylocaine) 10 mg/mL (1 %) injection  heparin 1,000 unit/mL injection 2,000 Units  heparin 1,000 unit/mL injection 2,000 Units  midodrine (Proamatine) tablet 15 mg  albumin human 25 % solution 25 g  polyethylene glycol (Glycolax, Miralax) packet 17 g  collagenase 250 unit/gram  ointment  vancomycin (Vancocin) 1,750 mg in dextrose 5 % in water (D5W) 250 mL IV  vancomycin-diluent combo no.1 (Xellia) IVPB 1,750 mg  albumin human 25 % solution 12.5 g  heparin 1,000 unit/mL injection 2,000 Units  heparin 1,000 unit/mL injection 2,000 Units  vancomycin (Vancocin) placeholder  doxycycline (Vibramycin) capsule 100 mg  potassium chloride CR (Klor-Con) ER tablet 10 mEq  gabapentin (Neurontin) capsule 100 mg  heparin 1,000 unit/mL injection 2,000 Units  heparin 1,000 unit/mL injection 2,000 Units  piperacillin-tazobactam-dextrose (Zosyn) IV 2.25 g  doxycycline (Vibramycin) capsule 100 mg  ipratropium-albuteroL (Duo-Neb) 0.5-2.5 mg/3 mL nebulizer solution 3 mL  heparin 1,000 unit/mL injection 2,000 Units  heparin 1,000 unit/mL injection 2,000 Units  ipratropium-albuteroL (Duo-Neb) 0.5-2.5 mg/3 mL nebulizer solution 3 mL  dexAMETHasone (Decadron) injection 6 mg  albumin human 25 % solution 25 g  epoetin di-epbx (Retacrit) injection 10,000 Units  lidocaine PF (Xylocaine) 20 mg/mL (2 %) injection  potassium chloride CR (Klor-Con) ER tablet 10 mEq  albumin human 5 % infusion 25 g  potassium chloride CR (Klor-Con M20) ER tablet 20 mEq  norepinephrine (Levophed) 8 mg in dextrose 5% 250 mL (0.032 mg/mL) infusion (premix)  oxygen (O2) therapy  piperacillin-tazobactam-dextrose (Zosyn) IV 2.25 g  heparin 1,000 unit/mL injection 2,000 Units  heparin 1,000 unit/mL injection 2,000 Units  potassium chloride 20 mEq in 100 mL IV premix  fidaxomicin (Dificid) tablet 200 mg  norepinephrine (Levophed) 8 mg in dextrose 5% 250 mL (0.032 mg/mL) infusion (premix)  heparin 1,000 unit/mL injection 2,000 Units  heparin 1,000 unit/mL injection 2,000 Units  HYDROcodone-acetaminophen (Norco) 5-325 mg per tablet 1 tablet  dextrose 50 % injection 25 g  glucagon (Glucagen) injection 1 mg  dextrose 10 % in water (D10W) infusion  potassium chloride (Klor-Con) packet 20 mEq  potassium chloride 20 mEq in 100 mL IV premix  cosyntropin  (Cortrosyn) injection 250 mcg  heparin 1,000 unit/mL injection 2,000 Units  heparin 1,000 unit/mL injection 2,000 Units  heparin 1,000 unit/mL injection 2,000 Units  heparin 1,000 unit/mL injection 2,000 Units  cholestyramine (Questran) 4 gram packet 4 g  potassium phosphate (monobasic) (K-Phos) tablet 500 mg  vancomycin (Vancocin) capsule 125 mg  heparin 1,000 unit/mL injection 2,000 Units  heparin 1,000 unit/mL injection 2,000 Units  hydrocortisone sod succ (PF) (Solu-CORTEF) injection 100 mg  insulin lispro (HumaLOG) injection 0-10 Units  insulin glargine (Lantus) injection 6 Units  hydrocortisone sod succ (PF) (Solu-CORTEF) injection 50 mg  vancomycin (Vancocin) capsule 500 mg  hydrocortisone sod succ (PF) (Solu-CORTEF) injection 50 mg      Relevant Results  Labs  Results from last 72 hours   Lab Units 01/02/24  0503 01/01/24  0450 12/31/23  0400   WBC AUTO x10*3/uL 7.8 7.8 7.5   HEMOGLOBIN g/dL 9.6* 9.7* 9.7*   HEMATOCRIT % 25.7* 29.3* 28.6*   PLATELETS AUTO x10*3/uL 272 271 312   NEUTROS PCT AUTO %  --  75.7 74.5   LYMPHO PCT MAN % 23.0  --   --    LYMPHS PCT AUTO %  --  16.3 18.5   MONO PCT MAN % 2.0  --   --    MONOS PCT AUTO %  --  7.1 6.0   EOSINO PCT MAN % 0.0  --   --    EOS PCT AUTO %  --  0.1 0.1     Results from last 72 hours   Lab Units 01/02/24  0504 01/01/24  0450 12/31/23  0400   SODIUM mmol/L 135 135 130*   POTASSIUM mmol/L 3.7 3.9 3.8   CHLORIDE mmol/L 99 99 94*   CO2 mmol/L 26 26 22*   BUN mg/dL 23 13 24   CREATININE mg/dL 3.00* 2.00* 3.40*   GLUCOSE mg/dL 202* 217* 269*   CALCIUM mg/dL 8.6 8.5 8.4*   ANION GAP mmol/L 10 10 14   EGFR mL/min/1.73m*2 16* 26* 14*   PHOSPHORUS mg/dL 2.4* 1.9* 3.0     Results from last 72 hours   Lab Units 01/02/24  0504 01/01/24  0450 12/31/23  0400   ALBUMIN g/dL 2.7* 2.8* 2.7*     Estimated Creatinine Clearance: 16.1 mL/min (A) (by C-G formula based on SCr of 3 mg/dL (H)).  C-Reactive Protein   Date Value Ref Range Status   12/25/2023 5.20 (H) 0.00 - 2.00 mg/dL  Final     CRP   Date Value Ref Range Status   06/23/2023 19.9 (H) 0 - 2.0 MG/DL Final     Comment:     Performed at [address] [address]   05/22/2022 1.0 0 - 2.0 MG/DL Final     Comment:     Performed at [address] [address]     Microbiology  Reviewed  Imaging  XR chest 1 view    Result Date: 12/26/2023  Interpreted By:  Rikki Patel, STUDY: XR CHEST 1 VIEW; 12/26/2023 12:23 pm   INDICATION: Signs/Symptoms:hypoxia   COMPARISON: 12/19/2023   ACCESSION NUMBER(S): [id number]   ORDERING CLINICIAN: BRITTANI TOLEDO   FINDINGS: The study is limited due to rotation. Dialysis catheter is stable in position. The cardiac silhouette is indeterminate due to the technique. There is interval significant worsening of large left effusion and left lung atelectasis/infiltrates, with complete opacification of the left hemithorax. There is no pneumothorax. Vascular stents are noted in subclavian vessels. Surgical staples are again seen in the left axilla. The osseous structures are unchanged.       Limited study. Interval worsening of large left pleural effusion and left lung infiltrates/atelectasis, with complete opacification of the left hemithorax.   Signed by: Rikki Patel 12/26/2023 12:40 PM Dictation workstation:   [id number]    US thoracentesis    Result Date: 12/20/2023  Interpreted By:  Todd La, STUDY: US THORACENTESIS;  12/15/2023 3:43 pm   INDICATION: Signs/Symptoms:L pleural effusion.   COMPARISON: Chest x-rayDated 12/14/2023.   ACCESSION NUMBER(S): [id number]   ORDERING CLINICIAN: PARIS LUCAS   TECHNIQUE: INTERVENTIONALIST(S): Todd La M.D.   CONSENT: The patient/patient's POA/next of kin was informed of the nature of the proposed procedure. The purposes, alternatives, risks, and benefits were explained and discussed. All questions were answered and consent was obtained.   SEDATION: None   MEDICATION/CONTRAST: No additional   TIME OUT: A time out  was performed immediately prior to procedure start with the interventional team, correctly identifying the patient name, date of birth, MRN, procedure, anatomy (including marking of site and side), patient position, procedure consent form, relevant laboratory and imaging test results, antibiotic administration, safety precautions, and procedure-specific equipment needs.   FINDINGS: The patient was placed in the sitting position.   The pleural space was examined with grey scale ultrasound, and the most accessible fluid identified and marked for thoracentesis.   The skin was prepped and draped in usual manner. Local anesthesia with Lidocaine was administered and a  left-sided thoracentesis was performed.  A 5 Mosotho One-Step thoracentesis needle/catheter was then placed where marked.  Approximately 600 mL of yellowish colored fluid was removed.  The needle/catheter was then withdrawn.   The patient tolerated the procedure well and there were no immediate complications. Specimen(s) sent to the laboratory and pathology for further evaluation, per the requesting team.       Uneventful  left-sided thoracentesis, as detailed above.   I personally performed and/or directly supervised this study and was present for the entire procedure.   I personally reviewed the study and resident interpretation. I agree with the findings as stated.   Performed and dictated at [facility].   MACRO: None   Signed by: Todd La 12/20/2023 9:48 PM Dictation workstation:   [id number]    XR chest 1 view    Result Date: 12/19/2023  Interpreted By:  Judd Lee, STUDY: XR CHEST 1 VIEW; 12/19/2023 5:44 am   INDICATION: Signs/Symptoms:pleural effusion.   COMPARISON: December 14, 2023   ACCESSION NUMBER(S): [id number]   ORDERING CLINICIAN: PARIS LUCAS   FINDINGS: RESULT: Right sided central venous catheter is again noted extending into the right atrium. The distal tip is not well visualized.  Left-sided vascular stents are noted in the left subclavian region. Left axillary surgical clips are noted. The cardiac silhouette is within normal limits for size. Mediastinal contours are unremarkable. Left basilar opacity obscures the left hemidiaphragm with hazy opacity throughout the left lung. There are degenerative changes of the thoracic spine and shoulders.       Persistent left basilar opacity obscures the left hemidiaphragm with hazy opacity extending through the left hemithorax. Findings likely represent pleural effusion atelectasis. Underlying infiltrate or mass is not excluded. Overall findings not significantly changed compared to previous exam     Signed by: Judd Lee 12/19/2023 9:02 AM Dictation workstation:   [id number]    XR chest 1 view    Result Date: 12/14/2023  Interpreted By:  Judd Lee, STUDY: XR CHEST 1 VIEW; 12/14/2023 10:43 am   INDICATION: Hypoxic respiratory failure.   COMPARISON: December 8, 2023   ACCESSION NUMBER(S): [id number]   ORDERING CLINICIAN: RODRI SURESH   FINDINGS: RESULT: Right-sided double-lumen dialysis catheter is noted with distal tip at the level of the right atrium. Vascular stents are noted in the left subclavian region. Surgical clips are noted in the left axilla. Cardiac silhouette size is indeterminate as the left heart border is obscured. There are calcifications involving the thoracic aorta. Left basilar obscurity obscures the left hemidiaphragm and left heart border with hazy opacity extending to the left mid lung. There are degenerative changes of the bilateral shoulders.       Increasing density in the left mid and lower lung suggests increasing infiltrate or pleural effusion.     Signed by: Judd Lee 12/14/2023 10:58 AM Dictation workstation:   [id number]    Vascular US ankle brachial index (OCTAVIO) without exercise    Result Date: 12/13/2023           [place] [address]             Phone [phone]  Vascular Lab Report  [facility] US ANKLE BRACHIAL INDEX (OCTAVIO) WITHOUT EXERCISE Patient Name:      BASIA WEISORAYA Gray Physician: [id number] Lizet Messer MD Study Date:        12/11/2023          Ordering Provider: [id number] MARIANN RICKI TATYANAGLORIA MRN/PID:           [id number]            Fellow: Accession#:        [id number]        Technologist:      Luz Maria Trivedi RVT Date of Birth/Age: 1950 / 73 years Technologist 2:    Dara Parr RVT Gender:            F                   Encounter#:        [id number] Admission Status:  Inpatient           Location           [facility]                                        Performed:  Diagnosis/ICD: Other specified symptoms and signs involving the circulatory and                respiratory systems-R09.89 Indication:    Peripheral vascular disease CPT Codes:     59513.52 Peripheral artery OCTAVIO Only Reduced Service  Pertinent History: Absent pedal pulses. Patient is on dialysis.  CONCLUSIONS: Right Lower PVR: Evidence of moderate arterial occlusive disease in the right lower extremity at rest. Right pressures of >220 mmHg suggest no compressibility of vessels and may make absolute Segmental Limb Pressures (SLP) unreliable. Decreased digital perfusion noted. Biphasic flow is noted in the right dorsalis pedis artery. Left Lower PVR: Evidence of moderate arterial occlusive disease in the left lower extremity at rest. Left pressures of >220 mmHg suggest no compressibility of vessels and may make absolute Segmental Limb Pressures (SLP) unreliable. Decreased digital perfusion noted. Biphasic flow is noted in the left dorsalis pedis artery. Additional Findings: Technically difficult and limited exam due to patient's positioning, movement and inability to cooperate with exam.  Imaging & Doppler Findings:  RIGHT Lower PVR              Pressures Right Dorsalis Pedis (Ankle) 255 mmHg   LEFT Lower PVR               Pressures Left Dorsalis Pedis (Ankle) 255 mmHg   [id number] Lizet Messer MD Electronically signed by [id number] Lizet Messer MD on 12/13/2023 at 8:38:56 AM  ** Final **     CT head wo IV contrast    Result Date: 12/11/2023  Interpreted By:  Brendon Hook, STUDY: CT HEAD WO IV CONTRAST; 12/11/2023 5:20 pm   INDICATION: Signs/Symptoms:acute confusion.   COMPARISON: 20 August 2023   ACCESSION NUMBER(S): [id number]   ORDERING CLINICIAN: RODRI SURESH   TECHNIQUE: CT was performed with one or more of the following dose reduction techniques: automated exposure control, adjustment of the mA and/or kV according to patient size, or use of iterative reconstruction technique.       PROCEDURE: 3.0 mm axial images were obtained through the brain, to include the posterior fossa without intravenous contrast enhancement.   FINDINGS: The patient's head is turned to the left and slightly canted in the gantry. Mild motion artifact is seen. There is symmetric volume loss of the cerebral hemispheres. Moderate  decreased attenuation in the bilateral periventricular white matter, consistent with microangiopathy is noted.  There is no encephalomalacic change. Brainstem is unremarkable. Cerebellar hemispheres are symmetric. No subarachnoid, intraparenchymal, subdural or interventricular hemorrhage. No intra- or extra-axial mass or abnormal blood products are demonstrated. Hyperostosis frontalis interna is seen. Slight hypoplasia of the left mastoid.   The paranasal sinuses and mastoids as well as calvarium are unremarkable.       1. No acute intracranial findings. 2. Symmetric volume loss of the cerebral hemispheres. 3. Periventricular Microangiopathy. 4. No posttraumatic abnormality.   This report has been produced using speech recognition. This exam is available in DICOM format to non-affiliated healthcare facilities on a secure media free searchable basis with prior patient authorization. The patient exposure is reported to a radiation dose  index registry. All CT examinations are performed with one or more of the following dose reduction techniques: Automated Exposure Control, Adjustment of mA and/or KV according to patient size, or use of iterative reconstruction techniques.   MACRO: None   Signed by: Brendon Hook 12/11/2023 7:05 PM Dictation workstation:   [id number]    US thoracentesis    Result Date: 12/8/2023  Interpreted By:  Sandro Hernandez, STUDY: US THORACENTESIS; 12/7/2023 3:16 pm   INDICATION: Signs/Symptoms:Left pleural effusion question of whether this needs thoracentesis, history of ESRD, just had dialysis today.   COMPARISON: None   ACCESSION NUMBER(S): [id number]   ORDERING CLINICIAN: JEZ LONG   TECHNIQUE: Informed consent obtained. Patient positionedsitting upright. Skin prepped, draped and anesthetized. Under ultrasound guidance, a centesis catheter/needle was advanced into rightpleural cavity.   FINDINGS: A total of 750 cc of clear yellow fluid was aspirated. A sample was sent for analysis. The patient tolerated the procedure well.       Ultrasound-guided left thoracentesis.     Signed by: Sandro Hernandez 12/8/2023 3:01 PM Dictation workstation:   [id number]    XR chest 1 view    Result Date: 12/8/2023  Interpreted By:  Maria Garcia, STUDY: XR CHEST 1 VIEW 12/8/2023 11:57 am   INDICATION: Signs/Symptoms:S/P thoracentesis   COMPARISON: 12/06/2023   ACCESSION NUMBER(S): [id number]   ORDERING CLINICIAN: JEZ LONG   TECHNIQUE: AP erect view of the chest at bedside   FINDINGS: There is an interval decrease in size of left pleural effusion when compared with the study done 2 days earlier. No pneumothorax is seen.   There is some residual left pleural effusion with infiltrates seen in the left mid and lower lung field. The right lung is clear.   There are surgical clips within the left axilla. Stent grafts are visible in the left subclavian region extending into the left axillary region. A tunneled right jugular dialysis  catheter terminates within right atrium.       Decreased size of left effusion following ultrasound-guided thoracentesis with no pneumothorax.   There is some residual left pleural effusion noted with infiltrate in the left mid and lower lung field.   Signed by: Maria Garcia 12/8/2023 12:51 PM Dictation workstation:   [id number]    XR foot 3+ views bilateral    Result Date: 12/7/2023  Interpreted By:  Brendon Hook, STUDY: XR FOOT 3+ VIEWS BILATERAL; 12/7/2023 3:31 pm   INDICATION: Signs/Symptoms:Wound/Abscess/Acute neha process rule out/Comparative Exam.   COMPARISON: None   ACCESSION NUMBER(S): [id number]   ORDERING CLINICIAN: JOSSELYN STAHL   TECHNIQUE: Nonweightbearing AP, lateral and oblique views of the bilateral foot were performed.   FINDINGS: Right foot: Large heel spur is seen. Mild Achilles enthesopathy demonstrated. Moderate atherosclerotic disease is seen. There is suggestion of loss of the arch on this nonweightbearing view. Degenerative change anterior plafond demonstrated and dorsal talus as well as posterior plafond and dorsal talus. Sclerosis anterior calcaneus is seen. Degenerative changes mild between the midfoot and forefoot. 1st metatarsophalangeal joint degenerative changes demonstrated. 2nd ray absent phalanges. Distal phalanges of the 3rd through 5th rays with overlap were difficult to visualize. Distal phalanx of the great toe is difficult to visualize.   Left foot: Large heel spur is seen. Advanced atherosclerotic disease demonstrated. Degenerative change in the calcaneus and talus seen with moderate anterior and mild posterior plafond degenerative change. Mild degenerative change in the midfoot and forefoot is seen. Distal phalanx of the great toe is absent. Degenerative change of the 2nd ray phalanges and 3rd ray phalanges seen with degenerative change of the 4th and 5th digits not well seen.       1. Right foot: Large heel spur. Atherosclerotic disease. Suggestion of diffuse  osteopenia. No sclerosis to suggest osteomyelitis. No subcutaneous air to suggest gangrene. Absent flanges 2nd ray with difficult to visualized distal phalanges of the other digits.   2. Left foot: Large heel spur. Degenerative changes of the hindfoot midfoot and forefoot with atherosclerotic disease. Absent distal phalanx of the great toe. No periostitis or abnormal sclerosis with diffuse osteopenia to suggest osteomyelitis. No subcutaneous air to suggest gangrene.       Signed by: Brendon Hook 12/7/2023 8:39 PM Dictation workstation:   [id number]    ECG 12 Lead    Result Date: 12/7/2023   Poor data quality, interpretation may be adversely affected Normal sinus rhythm Right bundle branch block Septal infarct , age undetermined Possible Lateral infarct , age undetermined Abnormal ECG No previous ECGs available Confirmed by Hammad Callahan ([id number]) on 12/7/2023 1:07:11 PM    XR chest 1 view    Result Date: 12/6/2023  Interpreted By:  Roger Murdock, STUDY: XR CHEST 1 VIEW;  12/6/2023 2:08 pm   INDICATION: Signs/Symptoms:Brief AMS and hypotension, now resolved.   COMPARISON: 08/21/2023..   ACCESSION NUMBER(S): [id number]   ORDERING CLINICIAN: PATEL TINSLEY   FINDINGS: New moderate left pleural effusion. There is silhouetting of the left heart border. Left subclavian stent present. Right-sided double-lumen central venous catheter terminates within the right ventricle. No acute osseous findings.       New, moderate left pleural effusion.     MACRO: None   Signed by: Roger Murdock 12/6/2023 2:18 PM Dictation workstation:   [id number]     Assessment/Plan   Shock-etiology unclear- remains off pressors  Unstageable Bilateral heel ulcers  C-difficile infection-resolving  Type 2 diabetes mellitus with peripheral angiopathy with gangrene   acute respiratory failure-resolved  Coronavirus infection-stable  Pleural effusion, s/p thoracentesis  History of chronic MRSA bacteremia, mitral valve infective endocarditis in August  2023-completed IV vancomycin, now on Doxycycline-at risk for recurrence-has a right chest wall dialysis catheter  ESRD on hemodialysis  Severe malnutrition-prealbumin of 3.2           Dificid-completed 10 days  Oral vancomycin-duration depends on clinical response-increaseD dose to  500 mg every 6 hours-avoiding Flagyl because of potential interaction with Lexapro  Questran  Oral doxycycline as suppressive therapy for MRSA bacteremia with mitral valve infective endocarditis  Monitor temperature  and WBC  Local care  Offloading  High-protein diet  Monitor stool  Podiatry follow up  Droplet plus precautions-COVID-discontinue 1/3/2024  Contact plus precautions-c.diff      Stephen Coulter MD

## 2024-01-02 NOTE — PROGRESS NOTES
Physical Therapy                 Therapy Communication Note    Patient Name: Ayanna Gross  MRN: [id number]  Today's Date: 1/2/2024     Discipline: Physical Therapy    Missed Visit Reason: Missed Visit Reason: Patient refused (pt declined therapy this morning. pt requesting therapist return in afternoon.)    Missed Time: Attempt

## 2024-01-02 NOTE — PROGRESS NOTES
Occupational Therapy    OT Treatment    Patient Name: Ayanna Gross  MRN: [id number]  Today's Date: 1/2/2024  Time Calculation  Start Time: 1452  Stop Time: 1530  Time Calculation (min): 38 min         Assessment:  OT Assessment: Pt demonstrated improved ability to participate this date, tolerating extended time seated unsupported at EOB for ADLs. Pt would benefit from continued acute OT services.  Medical Staff Made Aware: Yes  End of Session Communication: PCT/NA/CTA  End of Session Patient Position: Bed, 2 rail up, Alarm off, not on at start of session  Medical Staff Made Aware: Yes  Plan:  Treatment Interventions: ADL retraining, UE strengthening/ROM, Endurance training, Cognitive reorientation, Patient/family training, Neuromuscular reeducation, Equipment evaluation/education, Compensatory technique education, Fine motor coordination activities  No Skilled OT:  (Pt not medically appropriate)  OT Frequency: 2 times per week  OT Discharge Recommendations: Moderate intensity level of continued care  OT Recommended Transfer Status: Dependent  OT - OK to Discharge: Yes  Treatment Interventions: ADL retraining, UE strengthening/ROM, Endurance training, Cognitive reorientation, Patient/family training, Neuromuscular reeducation, Equipment evaluation/education, Compensatory technique education, Fine motor coordination activities    Subjective   Previous Visit Info:  OT Last Visit  OT Received On: 01/02/24  General:  General  Missed Visit: Yes  Missed Visit Reason: Patient refused (pt declined therapy this morning. pt requesting therapist return in afternoon.)  Prior to Session Communication: Bedside nurse  Patient Position Received: Bed, 2 rail up, Alarm off, not on at start of session  Preferred Learning Style: verbal  General Comment: RN cleared for treatment, pt agreeable to OT followup. Pt on iso for covid and Cdiff  Precautions:  Medical Precautions: Fall precautions, Oxygen therapy device and L/min, Infection  precautions  Vital Signs:  Vital Signs  Heart Rate: 88  Heart Rate Source: Monitor  SpO2: 96 %  BP: 76/56  BP Location: Right arm  BP Method: Automatic  Patient Position: Lying  Pain:  Pain Assessment  Pain Assessment: 0-10  Pain Score: 5 - Moderate pain  Pain Type: Chronic pain  Pain Location: Buttocks    Objective    Cognition:  Cognition  Overall Cognitive Status: Impaired  Orientation Level: Oriented X4 (though forgetful and requires repeated cues)  Memory:  (impaired)  Processing Speed:  (delayed)  Coordination:  Movements are Fluid and Coordinated: No  Activities of Daily Living: Feeding  Feeding Level of Assistance: Setup  Feeding Where Assessed: Bed level  Feeding Comments: able to self feed ice chips with setup    Grooming  Grooming Level of Assistance: Minimum assistance  Grooming Where Assessed: Edge of bed  Grooming Comments: pt able to brush front of hair, required extended time. Assist to comb back of hair, pt then able to clean out hairbrush. pt able to brush teeth with extended time and setup/cues              UE Dressing  UE Dressing Level of Assistance: Moderate assistance  UE Dressing Where Assessed: Edge of bed  UE Dressing Comments: assist to doff soiled gown and don clean gown assist to thread B Ues due to poor shouder flexion         Toileting  Toileting Level of Assistance: Dependent  Where Assessed: Bed level  Toileting Comments: fecal management system  Functional Standing Tolerance:     Bed Mobility/Transfers: Bed Mobility 1  Bed Mobility 1: Supine to sitting  Level of Assistance 1: Moderate assistance  Bed Mobility Comments 1: assist for B LEs and trunk to avoid shearing forces due to pressure sores. Pt able to sit for extended time at EOB with close S for ADLs and ther ex  Bed Mobility 2  Bed Mobility  2: Sitting to supine  Level of Assistance 2: Moderate assistance  Bed Mobility Comments 2: Pt returned to supine with assist for B LEs and trunk into supine, rolling to L for offloading  with wedge. needs in reach.    Outcome Measures:[facility] Daily Activity  Putting on and taking off regular lower body clothing: Total  Bathing (including washing, rinsing, drying): A lot  Putting on and taking off regular upper body clothing: A lot  Toileting, which includes using toilet, bedpan or urinal: Total  Taking care of personal grooming such as brushing teeth: A little  Eating Meals: A little  Daily Activity - Total Score: 12      Education Documentation  No documentation found.  Education Comments  No comments found.      Goals:  Encounter Problems            OT Goals       Pt will complete self-feeding with min A. (Progressing)       Start:  12/28/23    Expected End:  01/26/24            Pt will complete all bed mobility and tolerate sitting EOB unsupported for 10 minutes with F balance and with close S. (Progressing)       Start:  12/28/23    Expected End:  01/26/24            Pt will participate in BUE AAROM/AROM exercises in order to enhance functional performance. (Progressing)       Start:  12/28/23    Expected End:  01/26/24               Transfers       STG - Patient to transfer to and from sit to supine with mod A via log rolling for skin protection. (Not Progressing)       Start:  12/07/23    Expected End:  01/28/24            STG - Patient will perform bed mobility with min A to maintain pressure relief and prevent further skin integrity issues. (Progressing)       Start:  12/07/23    Expected End:  01/28/24            STG - Patient will roll from side to side using the bed rail to assist with hygiene and pressure relief with min A. (Progressing)       Start:  12/07/23    Expected End:  01/28/24                  Encounter Problems (Resolved)

## 2024-01-03 NOTE — PROGRESS NOTES
Physical Therapy                 Therapy Communication Note    Patient Name: Ayanna Gross  MRN: [id number]  Today's Date: 1/3/2024     Discipline: Physical Therapy    Missed Visit Reason: Missed Visit Reason: Patient in a medical procedure (pt receiving hemodialysis at the bedside.)    Missed Time: Cancel

## 2024-01-03 NOTE — PROGRESS NOTES
"Ayanna Gross is a 73 y.o. female on day 28 of admission presenting with Hypotension, unspecified hypotension type.    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 rate 14, height 1.575 m (5' 2.01\"), weight 72.8 kg (160 lb 7.9 oz), SpO2 100 %.  Intake/Output last 3 Shifts:  I/O last 3 completed shifts:  In: 150 (2.1 mL/kg) [P.O.:150]  Out: 0 (0 mL/kg)   Weight: 72.8 kg     Relevant Results                             Assessment/Plan   Principal Problem:    Hypotension, unspecified hypotension type  Active Problems:    Paroxysmal atrial fibrillation (CMS/[facility])    Peripheral vascular disease (CMS/[facility])    Orthostatic hypotension    Mixed hyperlipidemia    End-stage renal disease on hemodialysis (CMS/[facility])    DM (diabetes mellitus) (CMS/[facility])    Absent pedal pulses    Moderate normocytic anemia   Date of onset:12-29-18 .  MCV:93 .  moderate.  Stable.  Associated CBC findings: none. Hgb deficit: NO.   Ferritin: 2,373. Iron saturation: .  TIBC:130 (low, meets criterion for anemia of chronic disease .      Creatinine: 3.   GI bleeding history: no.  GI bleeding symptoms: no.  Colonoscopy: .  EGD: .     Transfusion history: not transfused   Hemolysis panel: reticulocytes: 4.5%.  LDH:141.  Haptoglobin:99.   Nutrition panel: B12:1014. Methylmalonic acid: not done.  Copper level: not done. ,    Inflammation panel: ESR: not done.  " Rheumatoid factor: not done.  PAPITO: not done.  CRP: not done.   Myeloma panel: SPEP/MICKIE: not done.  FLC: not done.     flow cytometry: not done.   TSH: 1.39.  IgG 1250, IgM 197, IgA 799  Discussion: Although the reticulocyte count is mildly elevated, the normal haptoglobin and LDH rules against there being active hemolysis   Impression: Anemia of chronic disease  Plan:  cold agglutinin titer, SPEP, erythropoietin level  Trend CBC  Transfuse 1 unit packed red cells if hemoglobin less than 7.0 g/dL        I spent 20 minutes in the professional and overall care of this patient.      Delta Lynne MD

## 2024-01-03 NOTE — PROGRESS NOTES
Ayanna Gross is a 73 y.o. female on day 28 of admission presenting with Hypotension, unspecified hypotension type.    Subjective   Interval History:   Patient seen and examined  Out of isolation for coronavirus  Remains on contact plus precautions for C. difficile infection  No diarrhea reported  FMS removed  Remains off pressors        Review of Systems   All other systems reviewed and are negative.      Objective   Range of Vitals (last 24 hours)  Heart Rate:  [75-89]   Temp:  [35.6 °C (96.1 °F)-36.8 °C (98.2 °F)]   Resp:  [6-19]   BP: (70-92)/(46-62)   SpO2:  [92 %-100 %]   Daily Weight  01/02/24 : 72.8 kg (160 lb 7.9 oz)    Body mass index is 29.35 kg/m².    Physical Exam  Constitutional:       Appearance: Normal appearance.   HENT:      Head: Normocephalic and atraumatic.      Nose: Nose normal.   Eyes:      Extraocular Movements: Extraocular movements intact.      Conjunctiva/sclera: Conjunctivae normal.   Cardiovascular:      Rate and Rhythm: Regular rhythm.      Heart sounds: Normal heart sounds.   Pulmonary:      Breath sounds: Decreased breath sounds present.   Abdominal:      General: Bowel sounds are normal.      Palpations: Abdomen is soft.   Skin:     Comments: Bilateral heel eschar    Neurological:      Mental Status: She is alert and oriented to person, place, and time.        Antibiotics  sodium chloride 0.9 % bolus 500 mL  cefepime (Maxipime) 1 g in dextrose 5 % 50 mL IV  vancomycin-diluent combo no.1 (Xellia) IVPB 1 g  apixaban (Eliquis) tablet 2.5 mg  calcium acetate (Phoslo) capsule 2,000 mg  escitalopram (Lexapro) tablet 10 mg  febuxostat (Uloric) tablet 40 mg  fenofibrate (Triglide) tablet 160 mg  gabapentin (Neurontin) capsule 300 mg  metoprolol succinate XL (Toprol-XL) 24 hr tablet 12.5 mg  midodrine (Proamatine) tablet 10 mg  nystatin (Mycostatin) 100,000 unit/gram powder 1 Application  B complex-vitamin C tablet 1 tablet  simvastatin (Zocor) tablet 40 mg  acetaminophen (Tylenol) tablet  650 mg  acetaminophen (Tylenol) tablet 650 mg  polyethylene glycol (Glycolax, Miralax) packet 17 g  dextrose 50 % injection 25 g  glucagon (Glucagen) injection 1 mg  dextrose 10 % in water (D10W) infusion  insulin lispro (HumaLOG) injection 0-5 Units  zinc oxide 20 % ointment 1 Application  calcium acetate (Phoslo) capsule 667 mg  vancomycin (Vancocin) capsule 125 mg  lidocaine PF (Xylocaine) 10 mg/mL (1 %) injection  heparin 1,000 unit/mL injection 2,000 Units  heparin 1,000 unit/mL injection 2,000 Units  midodrine (Proamatine) tablet 15 mg  albumin human 25 % solution 25 g  polyethylene glycol (Glycolax, Miralax) packet 17 g  collagenase 250 unit/gram ointment  vancomycin (Vancocin) 1,750 mg in dextrose 5 % in water (D5W) 250 mL IV  vancomycin-diluent combo no.1 (Xellia) IVPB 1,750 mg  albumin human 25 % solution 12.5 g  heparin 1,000 unit/mL injection 2,000 Units  heparin 1,000 unit/mL injection 2,000 Units  vancomycin (Vancocin) placeholder  doxycycline (Vibramycin) capsule 100 mg  potassium chloride CR (Klor-Con) ER tablet 10 mEq  gabapentin (Neurontin) capsule 100 mg  heparin 1,000 unit/mL injection 2,000 Units  heparin 1,000 unit/mL injection 2,000 Units  piperacillin-tazobactam-dextrose (Zosyn) IV 2.25 g  doxycycline (Vibramycin) capsule 100 mg  ipratropium-albuteroL (Duo-Neb) 0.5-2.5 mg/3 mL nebulizer solution 3 mL  heparin 1,000 unit/mL injection 2,000 Units  heparin 1,000 unit/mL injection 2,000 Units  ipratropium-albuteroL (Duo-Neb) 0.5-2.5 mg/3 mL nebulizer solution 3 mL  dexAMETHasone (Decadron) injection 6 mg  albumin human 25 % solution 25 g  epoetin di-epbx (Retacrit) injection 10,000 Units  lidocaine PF (Xylocaine) 20 mg/mL (2 %) injection  potassium chloride CR (Klor-Con) ER tablet 10 mEq  albumin human 5 % infusion 25 g  potassium chloride CR (Klor-Con M20) ER tablet 20 mEq  norepinephrine (Levophed) 8 mg in dextrose 5% 250 mL (0.032 mg/mL) infusion (premix)  oxygen (O2)  therapy  piperacillin-tazobactam-dextrose (Zosyn) IV 2.25 g  heparin 1,000 unit/mL injection 2,000 Units  heparin 1,000 unit/mL injection 2,000 Units  potassium chloride 20 mEq in 100 mL IV premix  fidaxomicin (Dificid) tablet 200 mg  norepinephrine (Levophed) 8 mg in dextrose 5% 250 mL (0.032 mg/mL) infusion (premix)  heparin 1,000 unit/mL injection 2,000 Units  heparin 1,000 unit/mL injection 2,000 Units  HYDROcodone-acetaminophen (Norco) 5-325 mg per tablet 1 tablet  dextrose 50 % injection 25 g  glucagon (Glucagen) injection 1 mg  dextrose 10 % in water (D10W) infusion  potassium chloride (Klor-Con) packet 20 mEq  potassium chloride 20 mEq in 100 mL IV premix  cosyntropin (Cortrosyn) injection 250 mcg  heparin 1,000 unit/mL injection 2,000 Units  heparin 1,000 unit/mL injection 2,000 Units  heparin 1,000 unit/mL injection 2,000 Units  heparin 1,000 unit/mL injection 2,000 Units  cholestyramine (Questran) 4 gram packet 4 g  potassium phosphate (monobasic) (K-Phos) tablet 500 mg  vancomycin (Vancocin) capsule 125 mg  heparin 1,000 unit/mL injection 2,000 Units  heparin 1,000 unit/mL injection 2,000 Units  hydrocortisone sod succ (PF) (Solu-CORTEF) injection 100 mg  insulin lispro (HumaLOG) injection 0-10 Units  insulin glargine (Lantus) injection 6 Units  hydrocortisone sod succ (PF) (Solu-CORTEF) injection 50 mg  vancomycin (Vancocin) capsule 500 mg  hydrocortisone sod succ (PF) (Solu-CORTEF) injection 50 mg      Relevant Results  Labs  Results from last 72 hours   Lab Units 01/02/24  0503 01/01/24  0450   WBC AUTO x10*3/uL 7.8 7.8   HEMOGLOBIN g/dL 9.6* 9.7*   HEMATOCRIT % 25.7* 29.3*   PLATELETS AUTO x10*3/uL 272 271   NEUTROS PCT AUTO %  --  75.7   LYMPHO PCT MAN % 23.0  --    LYMPHS PCT AUTO %  --  16.3   MONO PCT MAN % 2.0  --    MONOS PCT AUTO %  --  7.1   EOSINO PCT MAN % 0.0  --    EOS PCT AUTO %  --  0.1     Results from last 72 hours   Lab Units 01/02/24  0504 01/01/24  0450   SODIUM mmol/L 135 135    POTASSIUM mmol/L 3.7 3.9   CHLORIDE mmol/L 99 99   CO2 mmol/L 26 26   BUN mg/dL 23 13   CREATININE mg/dL 3.00* 2.00*   GLUCOSE mg/dL 202* 217*   CALCIUM mg/dL 8.6 8.5   ANION GAP mmol/L 10 10   EGFR mL/min/1.73m*2 16* 26*   PHOSPHORUS mg/dL 2.4* 1.9*     Results from last 72 hours   Lab Units 01/02/24  0917 01/02/24  0504 01/01/24  0450   PROTEIN TOTAL g/dL 6.0*  --   --    ALBUMIN g/dL  --  2.7* 2.8*     Estimated Creatinine Clearance: 16.1 mL/min (A) (by C-G formula based on SCr of 3 mg/dL (H)).  C-Reactive Protein   Date Value Ref Range Status   12/25/2023 5.20 (H) 0.00 - 2.00 mg/dL Final     CRP   Date Value Ref Range Status   06/23/2023 19.9 (H) 0 - 2.0 MG/DL Final     Comment:     Performed at [address] [address]   05/22/2022 1.0 0 - 2.0 MG/DL Final     Comment:     Performed at [address] [address]     Microbiology  Reviewed  Imaging  XR chest 1 view    Result Date: 12/26/2023  Interpreted By:  Rikki Patel, STUDY: XR CHEST 1 VIEW; 12/26/2023 12:23 pm   INDICATION: Signs/Symptoms:hypoxia   COMPARISON: 12/19/2023   ACCESSION NUMBER(S): [id number]   ORDERING CLINICIAN: BRITTANI TOLEDO   FINDINGS: The study is limited due to rotation. Dialysis catheter is stable in position. The cardiac silhouette is indeterminate due to the technique. There is interval significant worsening of large left effusion and left lung atelectasis/infiltrates, with complete opacification of the left hemithorax. There is no pneumothorax. Vascular stents are noted in subclavian vessels. Surgical staples are again seen in the left axilla. The osseous structures are unchanged.       Limited study. Interval worsening of large left pleural effusion and left lung infiltrates/atelectasis, with complete opacification of the left hemithorax.   Signed by: Rikki Patel 12/26/2023 12:40 PM Dictation workstation:   [id number]    US thoracentesis    Result Date: 12/20/2023  Interpreted By:   Todd La, STUDY: US THORACENTESIS;  12/15/2023 3:43 pm   INDICATION: Signs/Symptoms:L pleural effusion.   COMPARISON: Chest x-rayDated 12/14/2023.   ACCESSION NUMBER(S): [id number]   ORDERING CLINICIAN: PARIS LUCAS   TECHNIQUE: INTERVENTIONALIST(S): Todd La M.D.   CONSENT: The patient/patient's POA/next of kin was informed of the nature of the proposed procedure. The purposes, alternatives, risks, and benefits were explained and discussed. All questions were answered and consent was obtained.   SEDATION: None   MEDICATION/CONTRAST: No additional   TIME OUT: A time out was performed immediately prior to procedure start with the interventional team, correctly identifying the patient name, date of birth, MRN, procedure, anatomy (including marking of site and side), patient position, procedure consent form, relevant laboratory and imaging test results, antibiotic administration, safety precautions, and procedure-specific equipment needs.   FINDINGS: The patient was placed in the sitting position.   The pleural space was examined with grey scale ultrasound, and the most accessible fluid identified and marked for thoracentesis.   The skin was prepped and draped in usual manner. Local anesthesia with Lidocaine was administered and a  left-sided thoracentesis was performed.  A 5 Italian One-Step thoracentesis needle/catheter was then placed where marked.  Approximately 600 mL of yellowish colored fluid was removed.  The needle/catheter was then withdrawn.   The patient tolerated the procedure well and there were no immediate complications. Specimen(s) sent to the laboratory and pathology for further evaluation, per the requesting team.       Uneventful  left-sided thoracentesis, as detailed above.   I personally performed and/or directly supervised this study and was present for the entire procedure.   I personally reviewed the study and resident interpretation. I agree with the findings as stated.    Performed and dictated at [facility].   MACRO: None   Signed by: Todd La 12/20/2023 9:48 PM Dictation workstation:   [id number]    XR chest 1 view    Result Date: 12/19/2023  Interpreted By:  Judd Lee, STUDY: XR CHEST 1 VIEW; 12/19/2023 5:44 am   INDICATION: Signs/Symptoms:pleural effusion.   COMPARISON: December 14, 2023   ACCESSION NUMBER(S): [id number]   ORDERING CLINICIAN: PARIS LUCAS   FINDINGS: RESULT: Right sided central venous catheter is again noted extending into the right atrium. The distal tip is not well visualized. Left-sided vascular stents are noted in the left subclavian region. Left axillary surgical clips are noted. The cardiac silhouette is within normal limits for size. Mediastinal contours are unremarkable. Left basilar opacity obscures the left hemidiaphragm with hazy opacity throughout the left lung. There are degenerative changes of the thoracic spine and shoulders.       Persistent left basilar opacity obscures the left hemidiaphragm with hazy opacity extending through the left hemithorax. Findings likely represent pleural effusion atelectasis. Underlying infiltrate or mass is not excluded. Overall findings not significantly changed compared to previous exam     Signed by: Judd Lee 12/19/2023 9:02 AM Dictation workstation:   [id number]    XR chest 1 view    Result Date: 12/14/2023  Interpreted By:  Judd Lee, STUDY: XR CHEST 1 VIEW; 12/14/2023 10:43 am   INDICATION: Hypoxic respiratory failure.   COMPARISON: December 8, 2023   ACCESSION NUMBER(S): [id number]   ORDERING CLINICIAN: RODRI SURESH   FINDINGS: RESULT: Right-sided double-lumen dialysis catheter is noted with distal tip at the level of the right atrium. Vascular stents are noted in the left subclavian region. Surgical clips are noted in the left axilla. Cardiac silhouette size is indeterminate as the left heart border is obscured. There are  calcifications involving the thoracic aorta. Left basilar obscurity obscures the left hemidiaphragm and left heart border with hazy opacity extending to the left mid lung. There are degenerative changes of the bilateral shoulders.       Increasing density in the left mid and lower lung suggests increasing infiltrate or pleural effusion.     Signed by: Judd Lee 12/14/2023 10:58 AM Dictation workstation:   [id number]    Vascular US ankle brachial index (OCTAVIO) without exercise    Result Date: 12/13/2023           [place] [id number]            Phone [phone]  Vascular Lab Report  [facility] US ANKLE BRACHIAL INDEX (OCTAVIO) WITHOUT EXERCISE Patient Name:      BASIA Gray Physician: [id number] Lizet Messer MD Study Date:        12/11/2023          Ordering Provider: [id number] MARIANN SUTTON MRN/PID:           [id number]            Fellow: Accession#:        [id number]        Technologist:      Luz Maria Trivedi RVT Date of Birth/Age: 1950 / 73 years Technologist 2:    Dara Parr RVT Gender:            F                   Encounter#:        [id number] Admission Status:  Inpatient           Location           [facility]                                        Performed:  Diagnosis/ICD: Other specified symptoms and signs involving the circulatory and                respiratory systems-R09.89 Indication:    Peripheral vascular disease CPT Codes:     03870.52 Peripheral artery OCTAVIO Only Reduced Service  Pertinent History: Absent pedal pulses. Patient is on dialysis.  CONCLUSIONS: Right Lower PVR: Evidence of moderate arterial occlusive disease in the right lower extremity at rest. Right pressures of >220 mmHg suggest no compressibility of vessels and may make absolute Segmental Limb Pressures (SLP) unreliable. Decreased digital perfusion noted. Biphasic flow is noted in the right dorsalis pedis  artery. Left Lower PVR: Evidence of moderate arterial occlusive disease in the left lower extremity at rest. Left pressures of >220 mmHg suggest no compressibility of vessels and may make absolute Segmental Limb Pressures (SLP) unreliable. Decreased digital perfusion noted. Biphasic flow is noted in the left dorsalis pedis artery. Additional Findings: Technically difficult and limited exam due to patient's positioning, movement and inability to cooperate with exam.  Imaging & Doppler Findings:  RIGHT Lower PVR              Pressures Right Dorsalis Pedis (Ankle) 255 mmHg   LEFT Lower PVR              Pressures Left Dorsalis Pedis (Ankle) 255 mmHg   [id number] Lizet Messer MD Electronically signed by [id number] Lizet Messer MD on 12/13/2023 at 8:38:56 AM  ** Final **     CT head wo IV contrast    Result Date: 12/11/2023  Interpreted By:  Brendon Hook, STUDY: CT HEAD WO IV CONTRAST; 12/11/2023 5:20 pm   INDICATION: Signs/Symptoms:acute confusion.   COMPARISON: 20 August 2023   ACCESSION NUMBER(S): [id number]   ORDERING CLINICIAN: RODRI SURESH   TECHNIQUE: CT was performed with one or more of the following dose reduction techniques: automated exposure control, adjustment of the mA and/or kV according to patient size, or use of iterative reconstruction technique.       PROCEDURE: 3.0 mm axial images were obtained through the brain, to include the posterior fossa without intravenous contrast enhancement.   FINDINGS: The patient's head is turned to the left and slightly canted in the gantry. Mild motion artifact is seen. There is symmetric volume loss of the cerebral hemispheres. Moderate  decreased attenuation in the bilateral periventricular white matter, consistent with microangiopathy is noted.  There is no encephalomalacic change. Brainstem is unremarkable. Cerebellar hemispheres are symmetric. No subarachnoid, intraparenchymal, subdural or interventricular hemorrhage. No intra- or extra-axial mass or abnormal blood  products are demonstrated. Hyperostosis frontalis interna is seen. Slight hypoplasia of the left mastoid.   The paranasal sinuses and mastoids as well as calvarium are unremarkable.       1. No acute intracranial findings. 2. Symmetric volume loss of the cerebral hemispheres. 3. Periventricular Microangiopathy. 4. No posttraumatic abnormality.   This report has been produced using speech recognition. This exam is available in DICOM format to non-affiliated healthcare facilities on a secure media free searchable basis with prior patient authorization. The patient exposure is reported to a radiation dose index registry. All CT examinations are performed with one or more of the following dose reduction techniques: Automated Exposure Control, Adjustment of mA and/or KV according to patient size, or use of iterative reconstruction techniques.   MACRO: None   Signed by: Brendon Hook 12/11/2023 7:05 PM Dictation workstation:   [id number]    US thoracentesis    Result Date: 12/8/2023  Interpreted By:  Sandro Hernandez, STUDY: US THORACENTESIS; 12/7/2023 3:16 pm   INDICATION: Signs/Symptoms:Left pleural effusion question of whether this needs thoracentesis, history of ESRD, just had dialysis today.   COMPARISON: None   ACCESSION NUMBER(S): [id number]   ORDERING CLINICIAN: JEZ LONG   TECHNIQUE: Informed consent obtained. Patient positionedsitting upright. Skin prepped, draped and anesthetized. Under ultrasound guidance, a centesis catheter/needle was advanced into rightpleural cavity.   FINDINGS: A total of 750 cc of clear yellow fluid was aspirated. A sample was sent for analysis. The patient tolerated the procedure well.       Ultrasound-guided left thoracentesis.     Signed by: Sandro Hernandez 12/8/2023 3:01 PM Dictation workstation:   [id number]    XR chest 1 view    Result Date: 12/8/2023  Interpreted By:  Maria Garcia, STUDY: XR CHEST 1 VIEW 12/8/2023 11:57 am   INDICATION: Signs/Symptoms:S/P thoracentesis    COMPARISON: 12/06/2023   ACCESSION NUMBER(S): [id number]   ORDERING CLINICIAN: JEZ LONG   TECHNIQUE: AP erect view of the chest at bedside   FINDINGS: There is an interval decrease in size of left pleural effusion when compared with the study done 2 days earlier. No pneumothorax is seen.   There is some residual left pleural effusion with infiltrates seen in the left mid and lower lung field. The right lung is clear.   There are surgical clips within the left axilla. Stent grafts are visible in the left subclavian region extending into the left axillary region. A tunneled right jugular dialysis catheter terminates within right atrium.       Decreased size of left effusion following ultrasound-guided thoracentesis with no pneumothorax.   There is some residual left pleural effusion noted with infiltrate in the left mid and lower lung field.   Signed by: Maria Garcia 12/8/2023 12:51 PM Dictation workstation:   [id number]    XR foot 3+ views bilateral    Result Date: 12/7/2023  Interpreted By:  Brendon Hook, STUDY: XR FOOT 3+ VIEWS BILATERAL; 12/7/2023 3:31 pm   INDICATION: Signs/Symptoms:Wound/Abscess/Acute neha process rule out/Comparative Exam.   COMPARISON: None   ACCESSION NUMBER(S): [id number]   ORDERING CLINICIAN: JOSSELYN STAHL   TECHNIQUE: Nonweightbearing AP, lateral and oblique views of the bilateral foot were performed.   FINDINGS: Right foot: Large heel spur is seen. Mild Achilles enthesopathy demonstrated. Moderate atherosclerotic disease is seen. There is suggestion of loss of the arch on this nonweightbearing view. Degenerative change anterior plafond demonstrated and dorsal talus as well as posterior plafond and dorsal talus. Sclerosis anterior calcaneus is seen. Degenerative changes mild between the midfoot and forefoot. 1st metatarsophalangeal joint degenerative changes demonstrated. 2nd ray absent phalanges. Distal phalanges of the 3rd through 5th rays with overlap were difficult to  visualize. Distal phalanx of the great toe is difficult to visualize.   Left foot: Large heel spur is seen. Advanced atherosclerotic disease demonstrated. Degenerative change in the calcaneus and talus seen with moderate anterior and mild posterior plafond degenerative change. Mild degenerative change in the midfoot and forefoot is seen. Distal phalanx of the great toe is absent. Degenerative change of the 2nd ray phalanges and 3rd ray phalanges seen with degenerative change of the 4th and 5th digits not well seen.       1. Right foot: Large heel spur. Atherosclerotic disease. Suggestion of diffuse osteopenia. No sclerosis to suggest osteomyelitis. No subcutaneous air to suggest gangrene. Absent flanges 2nd ray with difficult to visualized distal phalanges of the other digits.   2. Left foot: Large heel spur. Degenerative changes of the hindfoot midfoot and forefoot with atherosclerotic disease. Absent distal phalanx of the great toe. No periostitis or abnormal sclerosis with diffuse osteopenia to suggest osteomyelitis. No subcutaneous air to suggest gangrene.       Signed by: Brendon Hook 12/7/2023 8:39 PM Dictation workstation:   [id number]    ECG 12 Lead    Result Date: 12/7/2023   Poor data quality, interpretation may be adversely affected Normal sinus rhythm Right bundle branch block Septal infarct , age undetermined Possible Lateral infarct , age undetermined Abnormal ECG No previous ECGs available Confirmed by Hammad Callahan ([id number]) on 12/7/2023 1:07:11 PM    XR chest 1 view    Result Date: 12/6/2023  Interpreted By:  Roger Murdock, STUDY: XR CHEST 1 VIEW;  12/6/2023 2:08 pm   INDICATION: Signs/Symptoms:Brief AMS and hypotension, now resolved.   COMPARISON: 08/21/2023..   ACCESSION NUMBER(S): [id number]   ORDERING CLINICIAN: PATEL TINSLEY   FINDINGS: New moderate left pleural effusion. There is silhouetting of the left heart border. Left subclavian stent present. Right-sided double-lumen central venous  catheter terminates within the right ventricle. No acute osseous findings.       New, moderate left pleural effusion.     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-completed IV vancomycin, now on Doxycycline-at risk for recurrence-has a right chest wall dialysis catheter  ESRD on hemodialysis  Severe malnutrition-prealbumin of 3.2           Dificid-completed  Oral vancomycin-4 times a day till 1/10/2024, followed by weekly tapering off to 3 times a day, twice daily, once a day-plan long-term suppressive therapy  Discontinue Questran  Oral doxycycline as suppressive therapy for MRSA bacteremia with mitral valve infective endocarditis  Monitor temperature  and WBC  Local care  Offloading  High-protein diet  Monitor stool  Podiatry follow up  Contact plus precautions-c.diff      Stephen Coulter MD

## 2024-01-03 NOTE — CARE PLAN
The patient's goals for the shift include maintain comfort    The clinical goals for the shift include pt. will remain hemodynamically stable throughout shift      Problem: Diabetes  Goal: Achieve decreasing blood glucose levels by end of shift  Outcome: Progressing  Goal: Increase stability of blood glucose readings by end of shift  Outcome: Progressing  Goal: Maintain electrolyte levels within acceptable range throughout shift  Outcome: Progressing  Goal: Maintain glucose levels >70mg/dl to <250mg/dl throughout shift  Outcome: Progressing  Goal: No changes in neurological exam by end of shift  Outcome: Progressing  Goal: Learn about and adhere to nutrition recommendations by end of shift  Outcome: Progressing  Goal: Vital signs within normal range for age by end of shift  Outcome: Progressing  Goal: Increase self care and/or family involovement by end of shift  Outcome: Progressing  Goal: Receive DSME education by end of shift  Outcome: Progressing     Problem: Pain - Adult  Goal: Verbalizes/displays adequate comfort level or baseline comfort level  Outcome: Progressing     Problem: Safety - Adult  Goal: Free from fall injury  Outcome: Progressing     Problem: Discharge Planning  Goal: Discharge to home or other facility with appropriate resources  Outcome: Progressing     Problem: Chronic Conditions and Co-morbidities  Goal: Patient's chronic conditions and co-morbidity symptoms are monitored and maintained or improved  Outcome: Progressing     Problem: Skin  Goal: Decreased wound size/increased tissue granulation at next dressing change  Outcome: Progressing  Flowsheets (Taken 1/3/2024 0739)  Decreased wound size/increased tissue granulation at next dressing change:   Promote sleep for wound healing   Utilize specialty bed per algorithm   Protective dressings over bony prominences  Goal: Participates in plan/prevention/treatment measures  Outcome: Progressing  Flowsheets (Taken 1/3/2024 0739)  Participates in  plan/prevention/treatment measures:   Discuss with provider PT/OT consult   Elevate heels   Increase activity/out of bed for meals  Goal: Prevent/manage excess moisture  Outcome: Progressing  Flowsheets (Taken 1/3/2024 0739)  Prevent/manage excess moisture:   Cleanse incontinence/protect with barrier cream   Moisturize dry skin   Follow provider orders for dressing changes   Monitor for/manage infection if present  Goal: Prevent/minimize sheer/friction injuries  Outcome: Progressing  Flowsheets (Taken 1/3/2024 0739)  Prevent/minimize sheer/friction injuries:   HOB 30 degrees or less   Turn/reposition every 2 hours/use positioning/transfer devices   Use pull sheet   Increase activity/out of bed for meals  Goal: Promote/optimize nutrition  Outcome: Progressing  Flowsheets (Taken 1/3/2024 0739)  Promote/optimize nutrition:   Monitor/record intake including meals   Consume > 50% meals/supplements   Offer water/supplements/favorite foods  Goal: Promote skin healing  Outcome: Progressing  Flowsheets (Taken 1/3/2024 0739)  Promote skin healing:   Assess skin/pad under line(s)/device(s)   Protective dressings over bony prominences   Turn/reposition every 2 hours/use positioning/transfer devices   Ensure correct size (line/device) and apply per  instructions   Rotate device position/do not position patient on device     Problem: Fall/Injury  Goal: Not fall by end of shift  Outcome: Progressing  Goal: Be free from injury by end of the shift  Outcome: Progressing  Goal: Verbalize understanding of personal risk factors for fall in the hospital  Outcome: Progressing  Goal: Verbalize understanding of risk factor reduction measures to prevent injury from fall in the home  Outcome: Progressing  Goal: Pace activities to prevent fatigue by end of the shift  Outcome: Progressing     Problem: Nutrition  Goal: Oral intake greater than 50%  Outcome: Progressing  Goal: Oral intake greater 75%  Outcome: Progressing  Goal:  Consume prescribed supplement  Outcome: Progressing  Goal: Adequate PO fluid intake  Outcome: Progressing  Goal: Nutrition support goals are met within 48 hrs  Outcome: Progressing  Goal: Nutrition support is meeting 75% of nutrient needs  Outcome: Progressing  Goal: Lab values WNL  Outcome: Progressing  Goal: Electrolytes WNL  Outcome: Progressing  Goal: Promote healing  Outcome: Progressing  Goal: Maintain stable weight  Outcome: Progressing  Goal: Reduce weight from edema/fluid  Outcome: Progressing  Goal: Gradual weight gain  Outcome: Progressing     Problem: Respiratory  Goal: Clear secretions with interventions this shift  Outcome: Progressing  Goal: Minimize anxiety/maximize coping throughout shift  Outcome: Progressing  Goal: Minimal/no exertional discomfort or dyspnea this shift  Outcome: Progressing  Goal: No signs of respiratory distress (eg. Use of accessory muscles. Peds grunting)  Outcome: Progressing  Goal: Patent airway maintained this shift  Outcome: Progressing  Goal: Verbalize decreased shortness of breath this shift  Outcome: Progressing     Problem: Pain  Goal: My pain/discomfort is manageable  Outcome: Progressing     Problem: Safety  Goal: Patient will be injury free during hospitalization  Outcome: Progressing  Goal: I will remain free of falls  Outcome: Progressing     Problem: Daily Care  Goal: Daily care needs are met  Outcome: Progressing     Problem: Psychosocial Needs  Goal: Demonstrates ability to cope with hospitalization/illness  Outcome: Progressing  Goal: Collaborate with me, my family, and caregiver to identify my specific goals  Outcome: Progressing     Problem: Discharge Barriers  Goal: My discharge needs are met  Outcome: Progressing     Problem: Pain  Goal: Takes deep breaths with improved pain control throughout the shift  Outcome: Progressing  Goal: Turns in bed with improved pain control throughout the shift  Outcome: Progressing  Goal: Walks with improved pain control  throughout the shift  Outcome: Progressing  Goal: Performs ADL's with improved pain control throughout shift  Outcome: Progressing  Goal: Participates in PT with improved pain control throughout the shift  Outcome: Progressing

## 2024-01-03 NOTE — PROGRESS NOTES
Critical Care Progress Note    Ayanna Gross is a 73 y.o. female on day 28 of admission presenting with Hypotension, unspecified hypotension type.    Subjective   Chart review  Off pressors  On room air    Objective   Vital Signs      1/2/2024     2:53 PM 1/2/2024     4:00 PM 1/2/2024     8:00 PM 1/3/2024    12:00 AM 1/3/2024     4:03 AM 1/3/2024     7:14 AM 1/3/2024     9:25 AM   Vitals   Systolic 76 92 80 70 83     Diastolic 56 62 51 52 58     Heart Rate 88 89 84 84 86  75   Temp  36.4 °C (97.5 °F)  36.5 °C (97.7 °F) 36.5 °C (97.7 °F) 35.6 °C (96.1 °F) 36.8 °C (98.2 °F)   Resp  19 16 6 14         Oxygen Therapy  SpO2: 99 %  Medical Gas Therapy: None (Room air)  O2 Delivery Method: Nasal cannula    FiO2 (%):  [97 %] 97 %    Intake/Output previous 24 hours:    Intake/Output Summary (Last 24 hours) at 1/3/2024 1004  Last data filed at 1/3/2024 0000  Gross per 24 hour   Intake 150 ml   Output 0 ml   Net 150 ml       Vitals show blood pressure in the 80s and 90s systolic           Lines and Tubes:  CVC Double lumen Tunneled Right Subclavian (Active)   Earliest Known Present: 12/06/23   Lumen Type: Double lumen  CVC Type: Tunneled  Description (optional): Permacath  Orientation: Right  Location: Subclavian   Number of days: 20       CVC 12/20/23 Triple lumen Non-tunneled Left Femoral (Active)   Placement Date/Time: 12/20/23 2233   Hand Hygiene Performed Prior to CVC Insertion: Yes  Site Prep: Chlorhexidine   Site Prep Agent has Completely Dried Before Insertion: Yes  All 5 Sterile Barriers Used (Gloves, Gown, Cap, Mask, Large Sterile Drape):...   Number of days: 5       Peripheral IV 12/21/23 22 G Right Forearm (Active)   Placement Date/Time: 12/21/23 0130   Size (Gauge): 22 G  Orientation: Right  Location: Forearm  Site Prep: Alcohol   Number of days: 5       Arterial Line 12/16/23 Right Femoral (Active)   Placement Date/Time: 12/16/23 (c) 1755   Size: 18 G  Orientation: Right  Location: Femoral  Site Prep:  Chlorhexidine   Local Anesthetic: Injectable  Insertion attempts: 1  Securement Method: Transparent dressing   Number of days: 9       Rectal Tube With balloon (Active)   Placement Date/Time: 12/19/23 1300   Type: (c) With balloon   Number of days: 6         Scheduled medications  apixaban, 2.5 mg, oral, BID  cholestyramine, 4 g, oral, Daily with evening meal  collagenase, , Topical, Daily  doxycycline, 100 mg, oral, Daily  epoetin di or biosimilar, 10,000 Units, intravenous, Every Mon/Wed/Fri  escitalopram, 10 mg, oral, Daily  febuxostat, 40 mg, oral, Every other day  fenofibrate, 160 mg, oral, Daily  gabapentin, 100 mg, oral, BID  heparin, 2,000 Units, intra-catheter, After Dialysis  heparin, 2,000 Units, intra-catheter, After Dialysis  heparin, 2,000 Units, intra-catheter, After Dialysis  heparin, 2,000 Units, intra-catheter, After Dialysis  hydrocortisone sodium succinate, 50 mg, intravenous, q12h GULSHAN  insulin glargine, 6 Units, subcutaneous, q24h  insulin lispro, 0-10 Units, subcutaneous, TID with meals  ipratropium-albuteroL, 3 mL, nebulization, TID  [Held by provider] metoprolol succinate XL, 12.5 mg, oral, Daily  midodrine, 15 mg, oral, TID with meals  nystatin, 1 Application, Topical, BID  simvastatin, 40 mg, oral, Nightly  vancomycin, 500 mg, oral, 4x daily      Continuous medications  norepinephrine, 0.01-3 mcg/kg/min, Last Rate: Stopped (12/29/23 1800)      PRN medications  PRN medications: acetaminophen, acetaminophen, dextrose 10 % in water (D10W), dextrose, glucagon, HYDROcodone-acetaminophen, oxygen, polyethylene glycol, zinc oxide    Relevant Results  Results from last 7 days   Lab Units 01/02/24  0503 01/01/24  0450 12/31/23  0400   WBC AUTO x10*3/uL 7.8 7.8 7.5   HEMOGLOBIN g/dL 9.6* 9.7* 9.7*   HEMATOCRIT % 25.7* 29.3* 28.6*   PLATELETS AUTO x10*3/uL 272 271 312      Results from last 7 days   Lab Units 01/02/24  0504 01/01/24  0450 12/31/23  0400   SODIUM mmol/L 135 135 130*   POTASSIUM mmol/L  3.7 3.9 3.8   CHLORIDE mmol/L 99 99 94*   CO2 mmol/L 26 26 22*   BUN mg/dL 23 13 24   CREATININE mg/dL 3.00* 2.00* 3.40*   GLUCOSE mg/dL 202* 217* 269*   CALCIUM mg/dL 8.6 8.5 8.4*          XR chest 1 view 12/19/2023    Narrative  Interpreted By:  Judd Lee,  STUDY:  XR CHEST 1 VIEW; 12/19/2023 5:44 am    INDICATION:  Signs/Symptoms:pleural effusion.    COMPARISON:  December 14, 2023    ACCESSION NUMBER(S):  [id number]    ORDERING CLINICIAN:  PARIS LUCAS    FINDINGS:  RESULT: Right sided central venous catheter is again noted extending  into the right atrium. The distal tip is not well visualized.  Left-sided vascular stents are noted in the left subclavian region.  Left axillary surgical clips are noted. The cardiac silhouette is  within normal limits for size. Mediastinal contours are unremarkable.  Left basilar opacity obscures the left hemidiaphragm with hazy  opacity throughout the left lung. There are degenerative changes of  the thoracic spine and shoulders.    Impression  Persistent left basilar opacity obscures the left hemidiaphragm with  hazy opacity extending through the left hemithorax. Findings likely  represent pleural effusion atelectasis. Underlying infiltrate or mass  is not excluded. Overall findings not significantly changed compared  to previous exam      Signed by: Judd Lee 12/19/2023 9:02 AM  Dictation workstation:   [id number]      Patient Active Problem List   Diagnosis    Amputation of finger, except thumb    Tenosynovitis    Inflammatory dermatosis    Skin abnormality    Dry skin    Arthritis    Syncope    Swelling of finger    Sinusitis, acute    Bronchitis, acute    Second degree burn of single finger of left hand, not thumb    Rash    Postmenopausal bleeding    Pneumonia    Infection    Phlegmon    Paroxysmal atrial fibrillation (CMS/HCC)    Right arm pain    Pain in right shoulder    Skin changes due to chronic exposure to nonionizing radiation, unspecified    Other  seborrheic keratosis    Inflamed seborrheic keratosis    Actinic keratosis    Open wound of finger    Obstructive sleep apnea    Obesity with body mass index 30 or greater    Nuclear senile cataract    Nonhealing surgical wound    Neoplasm of uncertain behavior of skin    Miosis    Mild nonproliferative diabetic retinopathy (CMS/HCC)    Lactic acidosis    Injury of head    Infection due to Escherichia coli    Hyponatremia    Peripheral vascular disease (CMS/HCC)    Orthostatic hypotension    Low blood pressure    Hypertension    History of breast cancer in female    Personal history of other malignant neoplasm of skin    High cholesterol    Mixed hyperlipidemia    Heart failure (CMS/HCC)    Headache    Growing pains    Gout    Chronic gout with tophus    Gastrointestinal hemorrhage    Fecal incontinence    Fatigue    Fall    Facial basal cell cancer    End-stage renal disease on hemodialysis (CMS/HCC)    Anemia of chronic renal failure    End stage renal disease (CMS/HCC)    Difficulty walking    Dyslipidemia    Diabetic foot ulcer (CMS/HCC)    Dermatochalasis of right upper eyelid    Dermatochalasis of left upper eyelid    Mixed anxiety and depressive disorder    Depression    Dependence on renal dialysis (CMS/HCC)    Contusion of face    Clouded consciousness    Chronic pain of both knees    Chronic osteomyelitis (CMS/HCC)    Chronic kidney disease, stage 3 (CMS/HCC)    Chronic kidney disease (CKD), stage IV (severe) (CMS/HCC)    Chorioretinal scar    Cerebrovascular accident (CMS/HCC)    Breast cancer (CMS/HCC)    Arthritis of knee, right    Arthritis of knee, left    Anemia of renal disease    Altered mental status    Colon cancer screening    Cellulitis of finger    Loss of consciousness (CMS/HCC)    Tenosynovitis of fingers    Thyroid nodule    Tinea corporis    Toxic metabolic encephalopathy    DM (diabetes mellitus) (CMS/HCC)    Hypoglycemia    Type 2 diabetes mellitus (CMS/HCC)    Visual hallucinations     Vocal cord paralysis    Wound infection    Hypotension, unspecified hypotension type    Absent pedal pulses     Assessment/Plan   Septic shock  COVID-19 infection  C. difficile colitis  End-stage renal disease  Possible endocarditis  Transudative Pleural effusion, s/p thoracentesis  History of chronic MRSA bacteremia, infective endocarditis-completed IV vancomycin, now on Doxycycline   Severe malnutrition      Off pressors  Dialysis per renal.  Oral doxycycline for history of MRSA.    Wound care  Minimum systolic pressure of 70 acceptable per nephrology.  Continue midodrine  Wean oxygen as tolerated  Taper steroids - daily starting 1/4    Matthew Engle MD  [facility]       Disclaimer: Parts of this chart were dictated with voice recognition software. Please excuse any errors of grammar, spelling, or transcription which are not corrected. Please contact me with any questions regarding documentation.

## 2024-01-03 NOTE — CARE PLAN
Problem: Skin  Goal: Decreased wound size/increased tissue granulation at next dressing change  Outcome: Progressing  Flowsheets (Taken 1/2/2024 2155)  Decreased wound size/increased tissue granulation at next dressing change: Protective dressings over bony prominences  Goal: Participates in plan/prevention/treatment measures  Outcome: Progressing  Flowsheets (Taken 1/2/2024 2155)  Participates in plan/prevention/treatment measures:   Discuss with provider PT/OT consult   Elevate heels  Goal: Prevent/manage excess moisture  Outcome: Progressing  Flowsheets (Taken 1/2/2024 2155)  Prevent/manage excess moisture:   Moisturize dry skin   Cleanse incontinence/protect with barrier cream  Goal: Prevent/minimize sheer/friction injuries  Outcome: Progressing  Flowsheets (Taken 1/2/2024 2155)  Prevent/minimize sheer/friction injuries:   Use pull sheet   Complete micro-shifts as needed if patient unable. Adjust patient position to relieve pressure points, not a full turn   HOB 30 degrees or less  Goal: Promote/optimize nutrition  Outcome: Progressing  Flowsheets (Taken 1/2/2024 0930 by Radha Funk RN)  Promote/optimize nutrition:   Assist with feeding   Monitor/record intake including meals   Consume > 50% meals/supplements  Goal: Promote skin healing  Outcome: Progressing  Flowsheets (Taken 1/2/2024 2155)  Promote skin healing:   Rotate device position/do not position patient on device   Assess skin/pad under line(s)/device(s)

## 2024-01-03 NOTE — PROGRESS NOTES
Occupational Therapy                 Therapy Communication Note    Patient Name: Ayanna Gross  MRN: [id number]  Today's Date: 1/3/2024     Discipline: Occupational Therapy    Missed Visit Reason: Missed Visit Reason: Patient in a medical procedure (Pt having dialysis)    Missed Time: Attempt    Comment:

## 2024-01-03 NOTE — PROGRESS NOTES
Ayanna Gross is a 73 y.o. female on day 28 of admission presenting with Hypotension, unspecified hypotension type.      Subjective   Getting dialysis this morning. States she feels well. Denies any complaints. Wants to go to rehab       Objective     Last Recorded Vitals  BP 83/58 (BP Location: Right arm, Patient Position: Lying)   Pulse 75   Temp 35.6 °C (96.1 °F) (Temporal)   Resp 14   Wt 72.8 kg (160 lb 7.9 oz)   SpO2 100%   Intake/Output last 3 Shifts:    Intake/Output Summary (Last 24 hours) at 1/3/2024 1150  Last data filed at 1/3/2024 0000  Gross per 24 hour   Intake 150 ml   Output 0 ml   Net 150 ml       Admission Weight  Weight: 72.6 kg (160 lb) (12/06/23 1301)    Daily Weight  01/02/24 : 72.8 kg (160 lb 7.9 oz)    Image Results  XR chest 1 view  Narrative: Interpreted By:  Rikki Patel,   STUDY:  XR CHEST 1 VIEW; 12/26/2023 12:23 pm      INDICATION:  Signs/Symptoms:hypoxia      COMPARISON:  12/19/2023      ACCESSION NUMBER(S):  [id number]      ORDERING CLINICIAN:  BRITTANI TOLEDO      FINDINGS:  The study is limited due to rotation.  Dialysis catheter is stable in position.  The cardiac silhouette is indeterminate due to the technique.  There is interval significant worsening of large left effusion and  left lung atelectasis/infiltrates, with complete opacification of the  left hemithorax. There is no pneumothorax.  Vascular stents are noted in subclavian vessels. Surgical staples are  again seen in the left axilla. The osseous structures are unchanged.      Impression: Limited study. Interval worsening of large left pleural effusion and  left lung infiltrates/atelectasis, with complete opacification of the  left hemithorax.      Signed by: Rikki Patel 12/26/2023 12:40 PM  Dictation workstation:   [id number]      Physical Exam  Constitutional:       General: She is not in acute distress.     Appearance: She is not toxic-appearing.   HENT:      Head: Normocephalic and atraumatic.       Mouth/Throat:      Mouth: Mucous membranes are moist.      Pharynx: Oropharynx is clear.   Eyes:      General: No scleral icterus.  Cardiovascular:      Rate and Rhythm: Normal rate and regular rhythm.   Pulmonary:      Effort: No respiratory distress.      Breath sounds: No wheezing.   Abdominal:      General: There is no distension.      Palpations: Abdomen is soft.   Musculoskeletal:      Right lower leg: No edema.      Left lower leg: No edema.   Neurological:      Mental Status: She is alert.   Psychiatric:         Mood and Affect: Mood normal.         Behavior: Behavior normal.         Relevant Results               Assessment/Plan          Principal Problem:    Hypotension, unspecified hypotension type  Active Problems:    Paroxysmal atrial fibrillation (CMS/HCC)    Peripheral vascular disease (CMS/HCC)    Orthostatic hypotension    Mixed hyperlipidemia    End-stage renal disease on hemodialysis (CMS/HCC)    DM (diabetes mellitus) (CMS/[facility])    Absent pedal pulses    Septic Shock, resolved  Unstageable bilateral heel ulcers  C. Diff colitis  Hx of MRSA bacteremia, infective endocarditis  COVID19 infection  ID following  Continue suppressive doxycycline for hx of MRSA bacteremia/IE  Continue PO vanc for C. Diff  Wean steroids     Chronic hypotension  Continue midodrine  Keep SBP >70 per nephrology     ESRD  Nephrology following  HD per nephrology     Anemia of chronic disease  Cold agglutinins disease  Heme-onc following  Hgb stable     Pleural effusion   S/p thoracentesis - transudative  Pulm following     PVD  B/L heel ulcers  Follow up with podiatry as outpatient     T2DM  Continue lantus and SSI    Discussed with Dr. Mondragon and ICU RN     Dispo: step down status. PT/OT. Needs SNF placement, awaiting precert              Yvonne Madrigal MD

## 2024-01-03 NOTE — PROGRESS NOTES
Hemodialysis procedure note:  I saw her during her hemodialysis procedure.  She was a little bit nauseated, did have 1 episode of emesis.  Blood pressure running 70 systolic.  Arterial line has been removed.  She is not on pressors.  She is on midodrine.  We are attempting 1 L fluid removal.  She has a cough.  Rectal tube removed, diarrhea improved.  Anticipate next hemodialysis would likely be on Friday.  Adhere to a Monday, Wednesday, Friday schedule, we are removing 1 L volume with most of the treatments.  Blood pressure not uncommonly is in the 70s systolic, which is acceptable.  She is on maximum dose midodrine and using low-temperature dialysate.  Case discussed with Dr. Madrigal.  No objection to discharge from my perspective.

## 2024-01-03 NOTE — PROGRESS NOTES
"Nutrition Follow up Note    Nutrition Assessment      Patient eating breakfast at time of visit. PO intake very good, patient requests Mighy Shakes to be discontinued. Patient not drinking Christian.  Explained benfits of Christian and patient states she will try harder to drink it.    Nutrition History:     Energy Intake: Good > 75 %  Food Allergies/Intolerances:  None  GI Symptoms: None  Oral Problems: None    Anthropometrics:  Ht: 157.5 cm (5' 2.01\"), Wt: 72.8 kg (160 lb 7.9 oz), BMI: 29.35  IBW/kg (Dietitian Calculated): 50 kg  Percent of IBW: 145 %  Adjusted Body Weight (kg): 55.7 kg    Weight Change:  Weight History / % Weight Change: Pt unsure of any weight changes, but reports her #. Possible 7# weight loss in 2 weeks.  Significant Weight Loss: Yes      Nutrition Focused Physical Exam Findings:   Subcutaneous Fat Loss  Buccal Fat Pads: Well nourished (full, rounded cheeks)  Triceps: Mild-moderate (less than ample fat tissue)    Muscle Wasting  Temporalis: Well nourished (well-defined muscle)  Pectoralis (Clavicular Region): Well nourished (clavicle not visible)  Deltoid/Trapezius: Mild-Moderate (slight protrusion of acromion process)    Physical Findings (Nutrition Deficiency/Toxicity)  Skin:  (bilateral diabetic ulcer non-healing wounds, groin bilateral wound, and buttocks bilateral wound (12/6))    Nutrition Significant Labs:  Lab Results   Component Value Date    WBC 7.8 01/02/2024    HGB 9.6 (L) 01/02/2024    HCT 25.7 (L) 01/02/2024     01/02/2024    CHOL 104 (L) 07/01/2023    TRIG 155 (H) 07/01/2023    HDL 30 (L) 07/01/2023    ALT 7 12/26/2023    AST 14 12/26/2023     01/02/2024    K 3.7 01/02/2024    CL 99 01/02/2024    CREATININE 3.00 (H) 01/02/2024    BUN 23 01/02/2024    CO2 26 01/02/2024    TSH 1.39 01/02/2024    INR 1.1 08/20/2023    HGBA1C 8.8 (H) 06/25/2023    ALBUR 1,315 (H) 02/24/2019       Current Facility-Administered Medications:     acetaminophen (Tylenol) tablet 650 mg, 650 " mg, oral, q4h PRN, MADHURI Carrasquillo-CNP    acetaminophen (Tylenol) tablet 650 mg, 650 mg, oral, q4h PRN, MADHURI Carrasquillo-CNP, 650 mg at 12/16/23 2021    acetylcysteine (Mucomyst) 200 mg/mL (20 %) nebulizer solution 600 mg, 3 mL, nebulization, 4x daily, Matthew Engle MD, 600 mg at 01/03/24 1045    apixaban (Eliquis) tablet 2.5 mg, 2.5 mg, oral, BID, LILLIE Carrasquillo, 2.5 mg at 01/03/24 0823    budesonide (Pulmicort) 0.5 mg/2 mL nebulizer solution 0.25 mg, 0.25 mg, nebulization, BID, Matthew Engle MD, 0.25 mg at 01/03/24 1045    cholestyramine (Questran) 4 gram packet 4 g, 4 g, oral, Daily with evening meal, Stephen Coulter MD, 4 g at 12/30/23 1700    collagenase 250 unit/gram ointment, , Topical, Daily, MADHURI Carrasquillo-CNP, Given at 01/03/24 0826    dextrose 10 % in water (D10W) infusion, 0.3 g/kg/hr, intravenous, Once PRN, Dhiraj De La Garza MD    dextrose 50 % injection 25 g, 25 g, intravenous, q15 min PRN, Dhiraj De La Garza MD    doxycycline (Vibramycin) capsule 100 mg, 100 mg, oral, Daily, MADHURI Carrasquillo-CNP, 100 mg at 01/03/24 0823    epoetin di-epbx (Retacrit) injection 10,000 Units, 10,000 Units, intravenous, Every Mon/Wed/Fri, MADHURI Carrasquillo-CNP, 10,000 Units at 01/03/24 1353    escitalopram (Lexapro) tablet 10 mg, 10 mg, oral, Daily, MADHURI Carrasquillo-CNP, 10 mg at 01/03/24 0823    febuxostat (Uloric) tablet 40 mg, 40 mg, oral, Every other day, LILLIE Carrasquillo, 40 mg at 01/02/24 0855    fenofibrate (Triglide) tablet 160 mg, 160 mg, oral, Daily, LILLIE Carrasquillo, 160 mg at 01/03/24 0823    gabapentin (Neurontin) capsule 100 mg, 100 mg, oral, BID, LILLIE Carrasquillo, 100 mg at 01/03/24 0823    glucagon (Glucagen) injection 1 mg, 1 mg, intramuscular, q15 min PRN, Dhiraj De La Garza MD    heparin 1,000 unit/mL injection 2,000 Units, 2,000 Units, intra-catheter, After Dialysis, Saeed Mondragon MD, 1,600 Units at 12/29/23  1251    heparin 1,000 unit/mL injection 2,000 Units, 2,000 Units, intra-catheter, After Dialysis, Saeed Mondragon MD, 1,600 Units at 12/29/23 1251    heparin 1,000 unit/mL injection 2,000 Units, 2,000 Units, intra-catheter, After Dialysis, Saeed Mondragon MD, 2,000 Units at 01/03/24 1344    heparin 1,000 unit/mL injection 2,000 Units, 2,000 Units, intra-catheter, After Dialysis, Saeed Mondragon MD, 2,000 Units at 01/03/24 1345    HYDROcodone-acetaminophen (Norco) 5-325 mg per tablet 1 tablet, 1 tablet, oral, q6h PRN, Daniel Rubio DO, 1 tablet at 12/27/23 0103    [START ON 1/4/2024] hydrocortisone sod succ (PF) (Solu-CORTEF) injection 50 mg, 50 mg, intravenous, q24h, Yvonne Madrigal MD    insulin glargine (Lantus) injection 6 Units, 6 Units, subcutaneous, q24h, Veronica Yepez MD, 6 Units at 01/02/24 2046    insulin lispro (HumaLOG) injection 0-10 Units, 0-10 Units, subcutaneous, TID with meals, Veronica Yepez MD, 4 Units at 01/02/24 1307    ipratropium-albuteroL (Duo-Neb) 0.5-2.5 mg/3 mL nebulizer solution 3 mL, 3 mL, nebulization, 4x daily, Matthew Engle MD, 3 mL at 01/03/24 1045    [Held by provider] metoprolol succinate XL (Toprol-XL) 24 hr tablet 12.5 mg, 12.5 mg, oral, Daily, LILLIE Carrasquillo, 12.5 mg at 12/13/23 1327    midodrine (Proamatine) tablet 15 mg, 15 mg, oral, TID with meals, LILLIE Carrasquillo, 15 mg at 01/03/24 1353    norepinephrine (Levophed) 8 mg in dextrose 5% 250 mL (0.032 mg/mL) infusion (premix), 0.01-3 mcg/kg/min, intravenous, Continuous, Saeed Mondragon MD, Stopped at 12/29/23 1800    nystatin (Mycostatin) 100,000 unit/gram powder 1 Application, 1 Application, Topical, BID, Inez Josue, APRN-CNP, 1 Application at 01/03/24 0825    oxygen (O2) therapy, , inhalation, Continuous PRN - O2/gases, Klaudia Figueroa DO, 3 L/min at 12/26/23 0800    polyethylene glycol (Glycolax, Miralax) packet 17 g, 17 g, oral, Daily PRN, Inez Josue,  APRN-CNP    simvastatin (Zocor) tablet 40 mg, 40 mg, oral, Nightly, LILLIE Carrasquillo, 40 mg at 01/02/24 2027    vancomycin (Vancocin) capsule 500 mg, 500 mg, oral, 4x daily, Stephen Coulter MD, 500 mg at 01/03/24 1353    zinc oxide 20 % ointment 1 Application, 1 Application, Topical, q1h PRN, LILLIE Carrasquillo    Dietary Orders (From admission, onward)       Start     Ordered    12/22/23 1224  Oral nutritional supplements  Until discontinued        Comments: Fruit punch mixed with water   Question Answer Comment   Deliver with Breakfast    Deliver with Dinner    Select supplement: Christian        12/22/23 1224    12/21/23 1330  Adult diet Regular  Diet effective now        Question:  Diet type  Answer:  Regular    12/21/23 1329                     Estimated Needs:   Estimated Energy Needs  Total Energy Estimated Needs (kCal):  (0785-3172 kcals)  Total Estimated Energy Need per Day (kCal/kg):  (30-35 kcals/kg)  Method for Estimating Needs: adjusted wt    Estimated Protein Needs  Total Protein Estimated Needs (g):  (67-84)  Total Protein Estimated Needs (g/kg):  (1.2-1.5)  Method for Estimating Needs: adjusted wt    Estimated Fluid Needs  Total Fluid Estimated Needs (mL):  (0873-8974 mL)  Method for Estimating Needs: UO + 500-1000 ml      Nutrition Diagnosis   Nutrition Diagnosis:  Malnutrition Diagnosis  Patient has Malnutrition Diagnosis: Yes  Diagnosis Status: Ongoing  Malnutrition Diagnosis: Moderate malnutrition related to acute disease or injury  As Evidenced by: 1-2% unintentional wt loss in 1 week (~4% in 2 weeks), Poor intake <75% of estimated energy requirement > 7days, and mild fat and muscle deficits    Nutrition Diagnosis  Patient has Nutrition Diagnosis: Yes  Diagnosis Status (1): Ongoing  Nutrition Diagnosis 1: Increased nutrient needs  Related to (1): increased demand for nutrient  As Evidenced by (1): diabetic ulcers and wounds     Nutrition Interventions/Recommendations    Nutrition Interventions and Recommendations:    Nutrition Prescription:  Individualized Nutrition Prescription Provided for : 3253-3340 kcal and 67-84g protein provided via renal diet and supplements    Nutrition Interventions:   Food and/or Nutrient Delivery Interventions  Interventions: Meals and snacks, Medical food supplement  Meals and Snacks: General healthful diet  Goal: Provide as ordered  Medical Food Supplement: Commercial beverage  Goal: Christian bid to provide 90 calories, 2.5 gm protein per packet    Education Documentation  No documentation found.         Nutrition Monitoring and Evaluation   Monitoring/Evaluation:   Food/Nutrient Related History Monitoring  Monitoring and Evaluation Plan: Energy intake  Energy Intake: Estimated energy intake  Criteria: Pt will tolerate >75% of estimated energy needs  Fluid Intake: Estimated fluid intake  Criteria: Pt will tolerate >75% of supplements    Nutrition Focused Physical Findings  Monitoring and Evaluation Plan: Skin  Skin: Impaired wound healing  Criteria: Pt will maintain/show signs of improvement in skin integrity       Time Spent/Follow-up:   Follow Up  Time Spent (min): 30 minutes  Last Date of Nutrition Visit: 01/03/24  Nutrition Follow-Up Needed?: 7-10 days  Follow up Comment: 1/10/24

## 2024-01-03 NOTE — PROGRESS NOTES
Patient medically clear. Waiting on PT to see patient so that precert can be started. Patient will go to [facility]. Will follow.     **PATIENT DOES NOT HAVE A SAFE DISCHARGE PLAN    Shauna Jensen RN

## 2024-01-04 NOTE — CARE PLAN
The patient's goals for the shift include maintain comfort    The clinical goals for the shift include remain hemodynamically stable      Problem: Diabetes  Goal: Achieve decreasing blood glucose levels by end of shift  Outcome: Progressing  Goal: Increase stability of blood glucose readings by end of shift  Outcome: Progressing  Goal: Maintain electrolyte levels within acceptable range throughout shift  Outcome: Progressing  Goal: Maintain glucose levels >70mg/dl to <250mg/dl throughout shift  Outcome: Progressing  Goal: No changes in neurological exam by end of shift  Outcome: Progressing  Goal: Learn about and adhere to nutrition recommendations by end of shift  Outcome: Progressing  Goal: Vital signs within normal range for age by end of shift  Outcome: Progressing  Goal: Increase self care and/or family involovement by end of shift  Outcome: Progressing  Goal: Receive DSME education by end of shift  Outcome: Progressing     Problem: Pain - Adult  Goal: Verbalizes/displays adequate comfort level or baseline comfort level  Outcome: Progressing     Problem: Safety - Adult  Goal: Free from fall injury  Outcome: Progressing     Problem: Discharge Planning  Goal: Discharge to home or other facility with appropriate resources  Outcome: Progressing     Problem: Chronic Conditions and Co-morbidities  Goal: Patient's chronic conditions and co-morbidity symptoms are monitored and maintained or improved  Outcome: Progressing     Problem: Skin  Goal: Decreased wound size/increased tissue granulation at next dressing change  Outcome: Progressing  Flowsheets (Taken 1/4/2024 1004)  Decreased wound size/increased tissue granulation at next dressing change:   Promote sleep for wound healing   Utilize specialty bed per algorithm  Goal: Participates in plan/prevention/treatment measures  Outcome: Progressing  Flowsheets (Taken 1/4/2024 1004)  Participates in plan/prevention/treatment measures:   Elevate heels   Increase  activity/out of bed for meals  Goal: Prevent/manage excess moisture  Outcome: Progressing  Flowsheets (Taken 1/4/2024 1004)  Prevent/manage excess moisture:   Cleanse incontinence/protect with barrier cream   Moisturize dry skin  Goal: Prevent/minimize sheer/friction injuries  Outcome: Progressing  Flowsheets (Taken 1/4/2024 1004)  Prevent/minimize sheer/friction injuries:   Use pull sheet   HOB 30 degrees or less   Turn/reposition every 2 hours/use positioning/transfer devices  Goal: Promote/optimize nutrition  Outcome: Progressing  Flowsheets (Taken 1/4/2024 1004)  Promote/optimize nutrition:   Assist with feeding   Offer water/supplements/favorite foods  Goal: Promote skin healing  Outcome: Progressing  Flowsheets (Taken 1/4/2024 1004)  Promote skin healing:   Assess skin/pad under line(s)/device(s)   Turn/reposition every 2 hours/use positioning/transfer devices     Problem: Fall/Injury  Goal: Not fall by end of shift  Outcome: Progressing  Goal: Be free from injury by end of the shift  Outcome: Progressing  Goal: Verbalize understanding of personal risk factors for fall in the hospital  Outcome: Progressing  Goal: Verbalize understanding of risk factor reduction measures to prevent injury from fall in the home  Outcome: Progressing  Goal: Pace activities to prevent fatigue by end of the shift  Outcome: Progressing     Problem: Nutrition  Goal: Oral intake greater than 50%  Outcome: Progressing  Goal: Oral intake greater 75%  Outcome: Progressing  Goal: Consume prescribed supplement  Outcome: Progressing  Goal: Adequate PO fluid intake  Outcome: Progressing  Goal: Nutrition support goals are met within 48 hrs  Outcome: Progressing  Goal: Nutrition support is meeting 75% of nutrient needs  Outcome: Progressing  Goal: Lab values WNL  Outcome: Progressing  Goal: Electrolytes WNL  Outcome: Progressing  Goal: Promote healing  Outcome: Progressing  Goal: Maintain stable weight  Outcome: Progressing  Goal: Reduce  weight from edema/fluid  Outcome: Progressing  Goal: Gradual weight gain  Outcome: Progressing     Problem: Respiratory  Goal: Clear secretions with interventions this shift  Outcome: Progressing  Goal: Minimize anxiety/maximize coping throughout shift  Outcome: Progressing  Goal: Minimal/no exertional discomfort or dyspnea this shift  Outcome: Progressing  Goal: No signs of respiratory distress (eg. Use of accessory muscles. Peds grunting)  Outcome: Progressing  Goal: Patent airway maintained this shift  Outcome: Progressing  Goal: Verbalize decreased shortness of breath this shift  Outcome: Progressing     Problem: Pain  Goal: My pain/discomfort is manageable  Outcome: Progressing     Problem: Safety  Goal: Patient will be injury free during hospitalization  Outcome: Progressing  Goal: I will remain free of falls  Outcome: Progressing     Problem: Daily Care  Goal: Daily care needs are met  Outcome: Progressing     Problem: Psychosocial Needs  Goal: Demonstrates ability to cope with hospitalization/illness  Outcome: Progressing  Goal: Collaborate with me, my family, and caregiver to identify my specific goals  Outcome: Progressing     Problem: Discharge Barriers  Goal: My discharge needs are met  Outcome: Progressing     Problem: Pain  Goal: Takes deep breaths with improved pain control throughout the shift  Outcome: Progressing  Goal: Turns in bed with improved pain control throughout the shift  Outcome: Progressing  Goal: Walks with improved pain control throughout the shift  Outcome: Progressing  Goal: Performs ADL's with improved pain control throughout shift  Outcome: Progressing  Goal: Participates in PT with improved pain control throughout the shift  Outcome: Progressing

## 2024-01-04 NOTE — PROGRESS NOTES
Physical Therapy    Physical Therapy Treatment    Patient Name: Ayanna Gross  MRN: [id number]  Today's Date: 1/4/2024  Time Calculation  Start Time: 0933  Stop Time: 1002  Time Calculation (min): 29 min    Assessment/Plan   PT Assessment  End of Session Communication: Bedside nurse, Care Coordinator  Assessment Comment: pt with impaired functional mobility and tolerance to activity, increased pain when sitting.  B LE weakness and ROM deficits persist. pt is participatory and following commands. pt is eager to improve her function and be able to get OOB.  End of Session Patient Position: Bed, 4 rail up, Alarm off, not on at start of session    PT Plan  Treatment/Interventions: Bed mobility, Transfer training, Balance training, Strengthening, Endurance training, Range of motion, Therapeutic exercise, Therapeutic activity  PT Plan: Skilled PT  PT Frequency: 2 times per week  PT Discharge Recommendations: Moderate intensity level of continued care  Equipment Recommended upon Discharge: Wheelchair  PT Recommended Transfer Status: Assist x2  PT - OK to Discharge: Yes    General Visit Information:   PT  Visit  PT Received On: 01/04/24  Response to Previous Treatment: Patient reporting fatigue but able to participate.  General  Family/Caregiver Present: No  Co-Treatment: OT  Co-Treatment Reason: patient and caregiver safety to optimize outcomes  Prior to Session Communication: Bedside nurse  Patient Position Received: Bed, 4 rail up, Alarm off, not on at start of session  Preferred Learning Style: verbal  General Comment: pt agreeable to therapy, appears fatigued this date however following commands and participatory.    Subjective   Precautions:  Precautions  LE Weight Bearing Status:  (pt BLE NWB however limited WB for transfers per podiatry)  Medical Precautions: Fall precautions, Infection precautions  Precautions Comment: Cdiff+, no longer in isolation for COVID  Vital Signs:  Vital Signs  Heart Rate: 102  Heart Rate  Source: Monitor  Resp: 18  SpO2: 99 %  BP: 73/58  MAP (mmHg): 64  BP Location: Right arm  BP Method: Automatic  Patient Position: Lying    Objective   Pain:  Pain Assessment  Pain Assessment: FLACC (Face, Legs, Activity, Cry, Consolability)  Pain Score: 8  Pain Type: Acute pain  Pain Location: Perineum  Pain Descriptors: Sharp  Pain Frequency:  (during hygiene)  Clinical Progression:  (decreasing after hygiene complete and barrier cream applied by RN)  Patient's Stated Pain Goal: No pain  Response to Interventions: less pain at rest  Cognition:  Cognition  Overall Cognitive Status: Within Functional Limits  Orientation Level: Oriented X4  Cognition Comments: following simple commands  Postural Control:  Postural Control  Posture Comment: rounded shoulders, forward head posture  Extremity/Trunk Assessments:  RLE   RLE :  (decreased ROM at end ranges, weakness especially proximally, multiple scabs on shin, heel not visualized)  LLE   LLE :  (decreased ROM at end ranges, weakness, scabs on shin, one is bleeding, heel not visualized.)  Activity Tolerance:  Activity Tolerance  Activity Tolerance Comments: fair-, limited due to increased pain from perineal wounds  Treatments:  Therapeutic Exercise  Therapeutic Exercise Performed:  (while seated EOB, performing B LE LAQ with verbal cues for correct performance. pt going through about half range quickly. max A to perform B hip flexion.)    Balance/Neuromuscular Re-Education  Balance/Neuromuscular Re-Education Activity Performed:  (sat EOB x 10 minutes with min A for balance. pt shifting weight side to side due to pain from poor skin integrity.)    Bed Mobility  Bed Mobility: Yes  Bed Mobility 1  Bed Mobility 1: Supine to sitting  Level of Assistance 1: Maximum assistance, Minimal verbal cues, Moderate tactile cues  Bed Mobility Comments 1: min A to complete moving B LEs off EOB, pt initiating movement. max A to elevate trunk and use draw sheet to square hips to EOB.  attempted log rolling to relieve pressure from pain in perineum.  Bed Mobility 2  Bed Mobility  2: Sitting to supine  Level of Assistance 2: Minimal verbal cues, Minimal tactile cues, Maximum assistance  Bed Mobility Comments 2: mod A for lowering trunk, max A for lifting B LEs into the bed.  Bed Mobility 3  Bed Mobility 3: Rolling right, Rolling left, Scooting  Level of Assistance 3: Moderate assistance, Moderate verbal cues, Maximum tactile cues  Bed Mobility Comments 3: mod A to roll to each side, min A to remain in sidelying with verbal cues to not retropulse and extend her body. Boosted to HOB with max A x 2 using the draw sheet, lifting buttocks off bed to scoot to prevent shearing.    Transfers  Transfer: No    Stairs  Stairs: No    Other Activity  Other Activity Performed:  (encouraged increased activity and repositioning self in the bed to keep pressure off buttocks. pt verbalized understanding.)    Outcome Measures:  [facility] Basic Mobility  Turning from your back to your side while in a flat bed without using bedrails: A lot  Moving from lying on your back to sitting on the side of a flat bed without using bedrails: A lot  Moving to and from bed to chair (including a wheelchair): Total  Standing up from a chair using your arms (e.g. wheelchair or bedside chair): Total  To walk in hospital room: Total  Climbing 3-5 steps with railing: Total  Basic Mobility - Total Score: 8    IP EDUCATION:  Individual(s) Educated: Patient  Education Provided: Body Mechanics, Fall Risk  Risk and Benefits Discussed with Patient/Caregiver/Other: yes  Patient/Caregiver Demonstrated Understanding: yes  Plan of Care Discussed and Agreed Upon: yes  Patient Response to Education: Patient/Caregiver Verbalized Understanding of Information  Education Comment: needs reinforcement    Encounter Problems       Encounter Problems (Active)       Balance       STG - Maintains static sitting balance with B upper extremity support on the edge of  the bed x 15 minutes  (Progressing)       Start:  12/07/23    Expected End:  01/28/24       INTERVENTIONS:  1. Practice sitting on the edge of a bed/mat with minimal support.  2. Educate patient about pressure relief.              Transfers       STG - Patient to transfer to and from sit to supine with mod A via log rolling for skin protection. (Progressing)       Start:  12/07/23    Expected End:  01/28/24            STG - Patient will perform bed mobility with min A to maintain pressure relief and prevent further skin integrity issues. (Progressing)       Start:  12/07/23    Expected End:  01/28/24            STG - Patient will roll from side to side using the bed rail to assist with hygiene and pressure relief with min A. (Progressing)       Start:  12/07/23    Expected End:  01/28/24

## 2024-01-04 NOTE — PROGRESS NOTES
Patient is medically clear. Precert for admission to [facility] started on this day. 7000 to be completed. Will follow.     **PATIENT DOES NOT HAVE A SAFE DISCHARGE PLAN      Shauna Jensen RN

## 2024-01-04 NOTE — CARE PLAN
The patient's goals for the shift include maintain comfort    The clinical goals for the shift include remain hemodynamically stable      Problem: Diabetes  Goal: Achieve decreasing blood glucose levels by end of shift  Outcome: Progressing  Goal: Increase stability of blood glucose readings by end of shift  Outcome: Progressing  Goal: Maintain electrolyte levels within acceptable range throughout shift  Outcome: Progressing  Goal: Maintain glucose levels >70mg/dl to <250mg/dl throughout shift  Outcome: Progressing  Goal: No changes in neurological exam by end of shift  Outcome: Progressing  Goal: Learn about and adhere to nutrition recommendations by end of shift  Outcome: Progressing  Goal: Vital signs within normal range for age by end of shift  Outcome: Progressing  Goal: Increase self care and/or family involovement by end of shift  Outcome: Progressing  Goal: Receive DSME education by end of shift  Outcome: Progressing     Problem: Pain - Adult  Goal: Verbalizes/displays adequate comfort level or baseline comfort level  Outcome: Progressing     Problem: Safety - Adult  Goal: Free from fall injury  Outcome: Progressing     Problem: Discharge Planning  Goal: Discharge to home or other facility with appropriate resources  Outcome: Progressing     Problem: Chronic Conditions and Co-morbidities  Goal: Patient's chronic conditions and co-morbidity symptoms are monitored and maintained or improved  Outcome: Progressing     Problem: Skin  Goal: Decreased wound size/increased tissue granulation at next dressing change  Outcome: Progressing  Flowsheets (Taken 1/3/2024 2234)  Decreased wound size/increased tissue granulation at next dressing change:   Promote sleep for wound healing   Protective dressings over bony prominences   Utilize specialty bed per algorithm  Goal: Participates in plan/prevention/treatment measures  Outcome: Progressing  Flowsheets (Taken 1/3/2024 2234)  Participates in plan/prevention/treatment  measures:   Discuss with provider PT/OT consult   Elevate heels  Goal: Prevent/manage excess moisture  Outcome: Progressing  Flowsheets (Taken 1/3/2024 2234)  Prevent/manage excess moisture:   Cleanse incontinence/protect with barrier cream   Follow provider orders for dressing changes   Monitor for/manage infection if present  Goal: Prevent/minimize sheer/friction injuries  Outcome: Progressing  Flowsheets (Taken 1/3/2024 2234)  Prevent/minimize sheer/friction injuries:   Complete micro-shifts as needed if patient unable. Adjust patient position to relieve pressure points, not a full turn   HOB 30 degrees or less   Increase activity/out of bed for meals   Use pull sheet  Goal: Promote/optimize nutrition  Outcome: Progressing  Flowsheets (Taken 1/3/2024 2234)  Promote/optimize nutrition:   Assist with feeding   Consume > 50% meals/supplements   Monitor/record intake including meals   Offer water/supplements/favorite foods  Goal: Promote skin healing  Outcome: Progressing  Flowsheets (Taken 1/3/2024 2234)  Promote skin healing:   Assess skin/pad under line(s)/device(s)   Protective dressings over bony prominences   Turn/reposition every 2 hours/use positioning/transfer devices     Problem: Fall/Injury  Goal: Not fall by end of shift  Outcome: Progressing  Goal: Be free from injury by end of the shift  Outcome: Progressing  Goal: Verbalize understanding of personal risk factors for fall in the hospital  Outcome: Progressing  Goal: Verbalize understanding of risk factor reduction measures to prevent injury from fall in the home  Outcome: Progressing  Goal: Pace activities to prevent fatigue by end of the shift  Outcome: Progressing     Problem: Nutrition  Goal: Oral intake greater than 50%  Outcome: Progressing  Goal: Oral intake greater 75%  Outcome: Progressing  Goal: Consume prescribed supplement  Outcome: Progressing  Goal: Adequate PO fluid intake  Outcome: Progressing  Goal: Nutrition support goals are met within  48 hrs  Outcome: Progressing  Goal: Nutrition support is meeting 75% of nutrient needs  Outcome: Progressing  Goal: Lab values WNL  Outcome: Progressing  Goal: Electrolytes WNL  Outcome: Progressing  Goal: Promote healing  Outcome: Progressing  Goal: Maintain stable weight  Outcome: Progressing  Goal: Reduce weight from edema/fluid  Outcome: Progressing  Goal: Gradual weight gain  Outcome: Progressing     Problem: Respiratory  Goal: Clear secretions with interventions this shift  Outcome: Progressing  Goal: Minimize anxiety/maximize coping throughout shift  Outcome: Progressing  Goal: Minimal/no exertional discomfort or dyspnea this shift  Outcome: Progressing  Goal: No signs of respiratory distress (eg. Use of accessory muscles. Peds grunting)  Outcome: Progressing  Goal: Patent airway maintained this shift  Outcome: Progressing  Goal: Verbalize decreased shortness of breath this shift  Outcome: Progressing     Problem: Pain  Goal: My pain/discomfort is manageable  Outcome: Progressing     Problem: Safety  Goal: Patient will be injury free during hospitalization  Outcome: Progressing  Goal: I will remain free of falls  Outcome: Progressing     Problem: Daily Care  Goal: Daily care needs are met  Outcome: Progressing     Problem: Psychosocial Needs  Goal: Demonstrates ability to cope with hospitalization/illness  Outcome: Progressing  Goal: Collaborate with me, my family, and caregiver to identify my specific goals  Outcome: Progressing     Problem: Discharge Barriers  Goal: My discharge needs are met  Outcome: Progressing     Problem: Pain  Goal: Takes deep breaths with improved pain control throughout the shift  Outcome: Progressing  Goal: Turns in bed with improved pain control throughout the shift  Outcome: Progressing  Goal: Walks with improved pain control throughout the shift  Outcome: Progressing  Goal: Performs ADL's with improved pain control throughout shift  Outcome: Progressing  Goal: Participates in PT  with improved pain control throughout the shift  Outcome: Progressing

## 2024-01-04 NOTE — PROGRESS NOTES
Occupational Therapy    OT Treatment    Patient Name: Ayanna Gross  MRN: [id number]  Today's Date: 1/4/2024  Time Calculation  Start Time: 0934  Stop Time: 0959  Time Calculation (min): 25 min         Assessment:  OT Assessment: pt tolerated session fairly with increased pain noted. Pt would benefit from continued skilled OT services to improve strength and functional tolerance to increase independence with transfers and ADL tasks  End of Session Communication: Bedside nurse  End of Session Patient Position: Bed, 4 rail up, Alarm off, not on at start of session  OT Assessment Results: Decreased ADL status, Decreased upper extremity range of motion, Decreased upper extremity strength, Decreased safe judgment during ADL, Decreased cognition, Decreased endurance, Decreased sensation, Decreased fine motor control, Decreased functional mobility, Decreased gross motor control, Decreased trunk control for functional activities  Plan:  Treatment Interventions: ADL retraining, UE strengthening/ROM, Endurance training, Cognitive reorientation, Patient/family training, Neuromuscular reeducation, Equipment evaluation/education, Compensatory technique education, Fine motor coordination activities  No Skilled OT:  (Pt not medically appropriate)  OT Frequency: 2 times per week  OT Discharge Recommendations: Moderate intensity level of continued care  OT Recommended Transfer Status: Dependent  OT - OK to Discharge: Yes  Treatment Interventions: ADL retraining, UE strengthening/ROM, Endurance training, Cognitive reorientation, Patient/family training, Neuromuscular reeducation, Equipment evaluation/education, Compensatory technique education, Fine motor coordination activities    Subjective   Previous Visit Info:  OT Last Visit  OT Received On: 01/04/24  General:  General  Co-Treatment: PT  Co-Treatment Reason: patient and caregiver safety to optimize outcomes  Prior to Session Communication: Bedside nurse  Patient Position  Received: Bed, 4 rail up, Alarm off, not on at start of session  General Comment: pt cleared for therapy per RN. Pt supine in bed upon arrival demonstrating increased fatigue, but agreeable to tx session  Precautions:  LE Weight Bearing Status: Toe-touch weight bearing with block  Medical Precautions: Fall precautions, Infection precautions  Precautions Comment: Cdiff+  Vital Signs:  Vital Signs  Heart Rate: 102  Heart Rate Source: Monitor  Resp: 18  SpO2: 99 %  BP: 73/58  MAP (mmHg): 64  BP Location: Right arm  BP Method: Automatic  Patient Position: Lying  Pain:  Pain Assessment  Pain Assessment: FLACC (Face, Legs, Activity, Cry, Consolability)  Pain Score: 8  Pain Type: Acute pain  Pain Location: Perineum  Pain Frequency:  (present during pericare hygiene)  Clinical Progression:  (terminated when barrier cream applied)    Objective    Cognition:  Cognition  Overall Cognitive Status: Within Functional Limits  Orientation Level: Oriented X4    Activities of Daily Living:      Toileting  Toileting Level of Assistance: Dependent  Where Assessed: Bed level  Toileting Comments: dependent for pericare hygiene this date following bowel incontinence    Bed Mobility/Transfers: Bed Mobility  Bed Mobility: Yes  Bed Mobility 1  Bed Mobility 1: Supine to sitting  Level of Assistance 1: Maximum assistance, Minimal verbal cues, Moderate tactile cues  Bed Mobility Comments 1: max A supine>EOB with trunk elevation and to scoot hips forward with use of drawsheet  Bed Mobility 2  Bed Mobility  2: Sitting to supine  Level of Assistance 2: Minimal verbal cues, Minimal tactile cues, Maximum assistance  Bed Mobility Comments 2: max A to lift BLE on to bed with mod A to lower trunk  Bed Mobility 3  Bed Mobility 3: Rolling right, Rolling left, Scooting  Level of Assistance 3: Moderate assistance, Moderate verbal cues, Maximum tactile cues  Bed Mobility Comments 3: mod A to roll L/R for pericare hygiene and linen change with VC to not  retropulse trunk. Max A x2 to scoot to HOB with use of drawsheet    Sitting Balance:  Static Sitting Balance  Static Sitting-Balance Support: Bilateral upper extremity supported  Static Sitting-Level of Assistance: Minimum assistance, Contact guard  Static Sitting-Comment/Number of Minutes: Tobias>CGA with R lateral lean present d/t buttock pain with VC/TC to correct       Therapy/Activity:    Therapeutic Activity  Therapeutic Activity Performed: Yes  Therapeutic Activity 1: pt tolerated sitting EOB ~10 min with fair- balance with increased pain noted with pt offloading hips intermittently      Outcome Measures:[facility] Daily Activity  Putting on and taking off regular lower body clothing: Total  Bathing (including washing, rinsing, drying): A lot  Putting on and taking off regular upper body clothing: A lot  Toileting, which includes using toilet, bedpan or urinal: Total  Taking care of personal grooming such as brushing teeth: A little  Eating Meals: None  Daily Activity - Total Score: 13    Education Documentation  Home Exercise Program, taught by ANDERSON Miller at 1/4/2024 11:54 AM.  Learner: Patient  Readiness: Acceptance  Method: Explanation  Response: Verbalizes Understanding, Needs Reinforcement    ADL Training, taught by ANDERSON Miller at 1/4/2024 11:54 AM.  Learner: Patient  Readiness: Acceptance  Method: Explanation  Response: Verbalizes Understanding, Needs Reinforcement    Education Comments  No comments found.      OP EDUCATION:     Problem: OT Goals  Goal: Pt will complete self-feeding with min A.  Outcome: Progressing  Goal: Pt will complete all bed mobility and tolerate sitting EOB unsupported for 10 minutes with F balance and with close S.  Outcome: Progressing  Goal: Pt will participate in BUE AAROM/AROM exercises in order to enhance functional performance.  Outcome: Progressing

## 2024-01-04 NOTE — NURSING NOTE
Pt's wounds/skin breakdown on buttocks, jeaneth area and hip photographed. Pt bathed and Calazime lotion liberally applied to areas. Offloading boots on.  Pt's heels to be  photographed and dressed by nightshift.

## 2024-01-04 NOTE — PROGRESS NOTES
Ayanna Gross is a 73 y.o. female on day 29 of admission presenting with Hypotension, unspecified hypotension type.      Subjective   Feels well this AM. Denies any complaints.        Objective     Last Recorded Vitals  BP 73/58 (BP Location: Right arm, Patient Position: Lying)   Pulse 102   Temp 36.5 °C (97.7 °F) (Oral)   Resp 18   Wt 69.7 kg (153 lb 10.6 oz)   SpO2 99%   Intake/Output last 3 Shifts:    Intake/Output Summary (Last 24 hours) at 1/4/2024 1224  Last data filed at 1/4/2024 0900  Gross per 24 hour   Intake 520 ml   Output --   Net 520 ml       Admission Weight  Weight: 72.6 kg (160 lb) (12/06/23 1301)    Daily Weight  01/04/24 : 69.7 kg (153 lb 10.6 oz)    Image Results  XR chest 1 view  Narrative: Interpreted By:  Rikki Patel,   STUDY:  XR CHEST 1 VIEW; 12/26/2023 12:23 pm      INDICATION:  Signs/Symptoms:hypoxia      COMPARISON:  12/19/2023      ACCESSION NUMBER(S):  [id number]      ORDERING CLINICIAN:  BRITTANI TOLEDO      FINDINGS:  The study is limited due to rotation.  Dialysis catheter is stable in position.  The cardiac silhouette is indeterminate due to the technique.  There is interval significant worsening of large left effusion and  left lung atelectasis/infiltrates, with complete opacification of the  left hemithorax. There is no pneumothorax.  Vascular stents are noted in subclavian vessels. Surgical staples are  again seen in the left axilla. The osseous structures are unchanged.      Impression: Limited study. Interval worsening of large left pleural effusion and  left lung infiltrates/atelectasis, with complete opacification of the  left hemithorax.      Signed by: Rikki Patel 12/26/2023 12:40 PM  Dictation workstation:   [id number]      Physical Exam  Constitutional:       General: She is not in acute distress.     Appearance: She is not toxic-appearing.   HENT:      Head: Normocephalic and atraumatic.      Mouth/Throat:      Mouth: Mucous membranes are moist.       Pharynx: Oropharynx is clear.   Eyes:      General: No scleral icterus.  Cardiovascular:      Rate and Rhythm: Normal rate and regular rhythm.   Pulmonary:      Effort: No respiratory distress.      Breath sounds: No wheezing.   Abdominal:      General: There is no distension.      Palpations: Abdomen is soft.   Musculoskeletal:      Right lower leg: No edema.      Left lower leg: No edema.   Neurological:      Mental Status: She is alert.   Psychiatric:         Mood and Affect: Mood normal.         Behavior: Behavior normal.         Relevant Results               Assessment/Plan          Principal Problem:    Hypotension, unspecified hypotension type  Active Problems:    Paroxysmal atrial fibrillation (CMS/HCC)    Peripheral vascular disease (CMS/HCC)    Orthostatic hypotension    Mixed hyperlipidemia    End-stage renal disease on hemodialysis (CMS/HCC)    DM (diabetes mellitus) (CMS/[facility])    Absent pedal pulses    Septic Shock, resolved  Unstageable bilateral heel ulcers  C. Diff colitis  Hx of MRSA bacteremia, infective endocarditis  COVID19 infection  ID following  Continue suppressive doxycycline for hx of MRSA bacteremia/IE  Continue PO vanc for C. Diff  Stop steroids     Chronic hypotension  Continue midodrine  Keep SBP >70 per nephrology     ESRD  Nephrology following  HD per nephrology     Anemia of chronic disease  Cold agglutinins disease  Heme-onc following  Hgb stable     Pleural effusion   S/p thoracentesis - transudative  Pulm following     PVD  B/L heel ulcers  Follow up with podiatry as outpatient     T2DM  Continue lantus and SSI     Discussed with Dr. Mondragon and ICU RN     Dispo: step down status. Needs SNF placement, pending precert              Yvonne Madrigal MD

## 2024-01-04 NOTE — PROGRESS NOTES
CONSULT PROGRESS NOTES    SERVICE DATE: 1/4/2024   SERVICE TIME: 1:12 PM    CONSULTING SERVICE: Nephrology    ASSESSMENT AND PLAN   73-year-old with numerous medical problems including end-stage renal disease admitted for hypotension, C. difficile colitis, recurrent pleural effusion, and COVID 19.  1.  End-stage renal disease  2.  Hypotension  3.  Fluid overload  4.  Anemia of chronic renal disease     I am obliging a thrice weekly hemodialysis schedule.  She off of pressors, but remains on maximum dose midodrine.  We are targeting a systolic blood pressure above 70, as she is not uncommonly in the 70 systolic on outpatient dialysis.    There is no role for hemodialysis today.  Next hemodialysis on Friday.  I am not inclined to use CRRT.  Use 1 L ultrafiltration with low temperature dialysate.  Use 3K bath.  She seems to be mentating about her baseline.     Continue supportive care.    SUBJECTIVE  INTERVAL HPI: She feels well.  She has no nausea, no vomiting, continued diarrhea.  Evidently she is either intentionally or unintentionally not taking some of her oral medications.  She is found to be laying with own pills strewn about.    MEDICATIONS:  acetylcysteine, 3 mL, nebulization, 4x daily  apixaban, 2.5 mg, oral, BID  budesonide, 0.25 mg, nebulization, BID  collagenase, , Topical, Daily  doxycycline, 100 mg, oral, Daily  epoetin di or biosimilar, 10,000 Units, intravenous, Every Mon/Wed/Fri  escitalopram, 10 mg, oral, Daily  febuxostat, 40 mg, oral, Every other day  fenofibrate, 160 mg, oral, Daily  gabapentin, 100 mg, oral, BID  heparin, 2,000 Units, intra-catheter, After Dialysis  heparin, 2,000 Units, intra-catheter, After Dialysis  heparin, 2,000 Units, intra-catheter, After Dialysis  heparin, 2,000 Units, intra-catheter, After Dialysis  insulin glargine, 6 Units, subcutaneous, q24h  insulin lispro, 0-10 Units, subcutaneous, TID with meals  ipratropium-albuteroL, 3 mL, nebulization, 4x daily  [Held by  provider] metoprolol succinate XL, 12.5 mg, oral, Daily  midodrine, 15 mg, oral, TID with meals  nystatin, 1 Application, Topical, BID  simvastatin, 40 mg, oral, Nightly  vancomycin, 500 mg, oral, 4x daily           PRN medications: acetaminophen, acetaminophen, dextrose 10 % in water (D10W), dextrose, glucagon, HYDROcodone-acetaminophen, oxygen, polyethylene glycol, zinc oxide     OBJECTIVE  PHYSICAL EXAM:   Heart Rate:  []   Temp:  [36.5 °C (97.7 °F)-36.8 °C (98.2 °F)]   Resp:  [16-24]   BP: ()/(35-87)   Weight:  [69.7 kg (153 lb 10.6 oz)]   SpO2:  [88 %-100 %]   Body mass index is 28.1 kg/m².  This is a chronically ill-appearing mildly toxic obese white woman  Very pale skin  Hearing intact  Phonation intact  dry oral mucosa  Regular heart rate  Unlabored breathing  Abdomen is soft, nondistended, nontender, positive bowel sounds  No Amos catheter in place, no suprapubic tenderness to palpation  There is no significant lower extremity edema, left index finger amputation  Moves 4 limbs spontaneously  No obvious joint deformities  No lymphadenopathy  Right internal jugular tunneled hemodialysis catheter  She has failed fistula in her left upper extremity    DATA:   Labs:  Results for orders placed or performed during the hospital encounter of 12/06/23 (from the past 96 hour(s))   POCT GLUCOSE   Result Value Ref Range    POCT Glucose 140 (H) 74 - 99 mg/dL   POCT GLUCOSE   Result Value Ref Range    POCT Glucose 209 (H) 74 - 99 mg/dL   CBC and Auto Differential   Result Value Ref Range    WBC 7.8 4.4 - 11.3 x10*3/uL    nRBC 0.6 (H) 0.0 - 0.0 /100 WBCs    RBC 3.38 (L) 4.00 - 5.20 x10*6/uL    Hemoglobin 9.7 (L) 12.0 - 16.0 g/dL    Hematocrit 29.3 (L) 36.0 - 46.0 %    MCV 87 80 - 100 fL    MCH 28.7 26.0 - 34.0 pg    MCHC 33.1 32.0 - 36.0 g/dL    RDW 24.1 (H) 11.5 - 14.5 %    Platelets 271 150 - 450 x10*3/uL    Neutrophils % 75.7 40.0 - 80.0 %    Immature Granulocytes %, Automated 0.8 0.0 - 0.9 %     Lymphocytes % 16.3 13.0 - 44.0 %    Monocytes % 7.1 2.0 - 10.0 %    Eosinophils % 0.1 0.0 - 6.0 %    Basophils % 0.0 0.0 - 2.0 %    Neutrophils Absolute 5.88 (H) 1.60 - 5.50 x10*3/uL    Immature Granulocytes Absolute, Automated 0.06 0.00 - 0.50 x10*3/uL    Lymphocytes Absolute 1.27 0.80 - 3.00 x10*3/uL    Monocytes Absolute 0.55 0.05 - 0.80 x10*3/uL    Eosinophils Absolute 0.01 0.00 - 0.40 x10*3/uL    Basophils Absolute 0.00 0.00 - 0.10 x10*3/uL   Renal function panel   Result Value Ref Range    Glucose 217 (H) 65 - 99 mg/dL    Sodium 135 133 - 145 mmol/L    Potassium 3.9 3.4 - 5.1 mmol/L    Chloride 99 97 - 107 mmol/L    Bicarbonate 26 24 - 31 mmol/L    Urea Nitrogen 13 8 - 25 mg/dL    Creatinine 2.00 (H) 0.40 - 1.60 mg/dL    eGFR 26 (L) >60 mL/min/1.73m*2    Calcium 8.5 8.5 - 10.4 mg/dL    Phosphorus 1.9 (L) 2.5 - 4.5 mg/dL    Albumin 2.8 (L) 3.5 - 5.0 g/dL    Anion Gap 10 <=19 mmol/L   Morphology   Result Value Ref Range    RBC Morphology See Below     Polychromasia Mild     Target Cells Many     Ovalocytes Few     [place] Cells Few     Pappenheimer Bodies Present    POCT GLUCOSE   Result Value Ref Range    POCT Glucose 215 (H) 74 - 99 mg/dL   POCT GLUCOSE   Result Value Ref Range    POCT Glucose 151 (H) 74 - 99 mg/dL   POCT GLUCOSE   Result Value Ref Range    POCT Glucose 140 (H) 74 - 99 mg/dL   POCT GLUCOSE   Result Value Ref Range    POCT Glucose 188 (H) 74 - 99 mg/dL   CBC and Auto Differential   Result Value Ref Range    WBC 7.8 4.4 - 11.3 x10*3/uL    nRBC 0.8 (H) 0.0 - 0.0 /100 WBCs    RBC 2.76 (L) 4.00 - 5.20 x10*6/uL    Hemoglobin 9.6 (L) 12.0 - 16.0 g/dL    Hematocrit 25.7 (L) 36.0 - 46.0 %    MCV 93 80 - 100 fL    MCH 34.8 (H) 26.0 - 34.0 pg    MCHC 37.4 (H) 32.0 - 36.0 g/dL    RDW 28.3 (H) 11.5 - 14.5 %    Platelets 272 150 - 450 x10*3/uL    Immature Granulocytes %, Automated 1.2 (H) 0.0 - 0.9 %    Immature Granulocytes Absolute, Automated 0.09 0.00 - 0.50 x10*3/uL   Manual Differential   Result Value Ref  Range    Neutrophils %, Manual 73.0 40.0 - 80.0 %    Lymphocytes %, Manual 23.0 13.0 - 44.0 %    Monocytes %, Manual 2.0 2.0 - 10.0 %    Eosinophils %, Manual 0.0 0.0 - 6.0 %    Basophils %, Manual 0.0 0.0 - 2.0 %    Atypical Lymphocytes %, Manual 2.0 0.0 - 2.0 %    Seg Neutrophils Absolute, Manual 5.69 (H) 1.60 - 5.00 x10*3/uL    Lymphocytes Absolute, Manual 1.79 0.80 - 3.00 x10*3/uL    Monocytes Absolute, Manual 0.16 0.05 - 0.80 x10*3/uL    Eosinophils Absolute, Manual 0.00 0.00 - 0.40 x10*3/uL    Basophils Absolute, Manual 0.00 0.00 - 0.10 x10*3/uL    Atypical Lymphs Absolute, Manual 0.16 0.00 - 0.30 x10*3/uL    Total Cells Counted 100     RBC Morphology See Below     Polychromasia Mild     Target Cells Many     Ovalocytes Few     Bhavesh Cells Few    Renal function panel   Result Value Ref Range    Glucose 202 (H) 65 - 99 mg/dL    Sodium 135 133 - 145 mmol/L    Potassium 3.7 3.4 - 5.1 mmol/L    Chloride 99 97 - 107 mmol/L    Bicarbonate 26 24 - 31 mmol/L    Urea Nitrogen 23 8 - 25 mg/dL    Creatinine 3.00 (H) 0.40 - 1.60 mg/dL    eGFR 16 (L) >60 mL/min/1.73m*2    Calcium 8.6 8.5 - 10.4 mg/dL    Phosphorus 2.4 (L) 2.5 - 4.5 mg/dL    Albumin 2.7 (L) 3.5 - 5.0 g/dL    Anion Gap 10 <=19 mmol/L   POCT GLUCOSE   Result Value Ref Range    POCT Glucose 172 (H) 74 - 99 mg/dL   Lavender Top   Result Value Ref Range    Extra Tube Hold for add-ons.    TSH   Result Value Ref Range    Thyroid Stimulating Hormone 1.39 0.27 - 4.20 mIU/L   IgG, IgA, IgM   Result Value Ref Range    IgG 1,250 700 - 1,600 mg/dL    IgA 799 (H) 70 - 400 mg/dL    IgM 197 40 - 230 mg/dL   Protein, Total   Result Value Ref Range    Total Protein 6.0 (L) 6.4 - 8.2 g/dL   POCT GLUCOSE   Result Value Ref Range    POCT Glucose 202 (H) 74 - 99 mg/dL   POCT GLUCOSE   Result Value Ref Range    POCT Glucose 96 74 - 99 mg/dL   POCT GLUCOSE   Result Value Ref Range    POCT Glucose 176 (H) 74 - 99 mg/dL   POCT GLUCOSE   Result Value Ref Range    POCT Glucose 114 (H)  74 - 99 mg/dL   POCT GLUCOSE   Result Value Ref Range    POCT Glucose 99 74 - 99 mg/dL   POCT GLUCOSE   Result Value Ref Range    POCT Glucose 195 (H) 74 - 99 mg/dL   POCT GLUCOSE   Result Value Ref Range    POCT Glucose 172 (H) 74 - 99 mg/dL   POCT GLUCOSE   Result Value Ref Range    POCT Glucose 106 (H) 74 - 99 mg/dL   POCT GLUCOSE   Result Value Ref Range    POCT Glucose 99 74 - 99 mg/dL         SIGNATURE: Saeed Mondragon MD PATIENT NAME: Ayanna Gross   DATE: January 4, 2024 MRN: [id number]   TIME: 1:12 PM PAGER: [phone]

## 2024-01-04 NOTE — PROGRESS NOTES
Ayanna Gross is a 73 y.o. female on day 29 of admission presenting with Hypotension, unspecified hypotension type.    Subjective   Interval History:   Afebrile  Resting comfortably  No diarrhea reported        Review of Systems    Objective   Range of Vitals (last 24 hours)  Heart Rate:  [75-95]   Temp:  [35.6 °C (96.1 °F)-36.8 °C (98.2 °F)]   Resp:  [16-24]   BP: ()/(35-87)   Weight:  [69.7 kg (153 lb 10.6 oz)]   SpO2:  [92 %-100 %]   Daily Weight  01/04/24 : 69.7 kg (153 lb 10.6 oz)    Body mass index is 28.1 kg/m².    Physical Exam  Constitutional:       Appearance: Normal appearance.   HENT:      Head: Normocephalic and atraumatic.      Nose: Nose normal.   Eyes:      Extraocular Movements: Extraocular movements intact.      Conjunctiva/sclera: Conjunctivae normal.   Cardiovascular:      Rate and Rhythm: Regular rhythm.      Heart sounds: Normal heart sounds.   Pulmonary:      Breath sounds: Decreased breath sounds present.   Abdominal:      General: Bowel sounds are normal.      Palpations: Abdomen is soft.   Skin:     Comments: Bilateral heel eschar    Neurological:      Mental Status: She is alert and oriented to person, place, and time.     Antibiotics  sodium chloride 0.9 % bolus 500 mL  cefepime (Maxipime) 1 g in dextrose 5 % 50 mL IV  vancomycin-diluent combo no.1 (Xellia) IVPB 1 g  apixaban (Eliquis) tablet 2.5 mg  calcium acetate (Phoslo) capsule 2,000 mg  escitalopram (Lexapro) tablet 10 mg  febuxostat (Uloric) tablet 40 mg  fenofibrate (Triglide) tablet 160 mg  gabapentin (Neurontin) capsule 300 mg  metoprolol succinate XL (Toprol-XL) 24 hr tablet 12.5 mg  midodrine (Proamatine) tablet 10 mg  nystatin (Mycostatin) 100,000 unit/gram powder 1 Application  B complex-vitamin C tablet 1 tablet  simvastatin (Zocor) tablet 40 mg  acetaminophen (Tylenol) tablet 650 mg  acetaminophen (Tylenol) tablet 650 mg  polyethylene glycol (Glycolax, Miralax) packet 17 g  dextrose 50 % injection 25 g  glucagon  (Glucagen) injection 1 mg  dextrose 10 % in water (D10W) infusion  insulin lispro (HumaLOG) injection 0-5 Units  zinc oxide 20 % ointment 1 Application  calcium acetate (Phoslo) capsule 667 mg  vancomycin (Vancocin) capsule 125 mg  lidocaine PF (Xylocaine) 10 mg/mL (1 %) injection  heparin 1,000 unit/mL injection 2,000 Units  heparin 1,000 unit/mL injection 2,000 Units  midodrine (Proamatine) tablet 15 mg  albumin human 25 % solution 25 g  polyethylene glycol (Glycolax, Miralax) packet 17 g  collagenase 250 unit/gram ointment  vancomycin (Vancocin) 1,750 mg in dextrose 5 % in water (D5W) 250 mL IV  vancomycin-diluent combo no.1 (Xellia) IVPB 1,750 mg  albumin human 25 % solution 12.5 g  heparin 1,000 unit/mL injection 2,000 Units  heparin 1,000 unit/mL injection 2,000 Units  vancomycin (Vancocin) placeholder  doxycycline (Vibramycin) capsule 100 mg  potassium chloride CR (Klor-Con) ER tablet 10 mEq  gabapentin (Neurontin) capsule 100 mg  heparin 1,000 unit/mL injection 2,000 Units  heparin 1,000 unit/mL injection 2,000 Units  piperacillin-tazobactam-dextrose (Zosyn) IV 2.25 g  doxycycline (Vibramycin) capsule 100 mg  ipratropium-albuteroL (Duo-Neb) 0.5-2.5 mg/3 mL nebulizer solution 3 mL  heparin 1,000 unit/mL injection 2,000 Units  heparin 1,000 unit/mL injection 2,000 Units  ipratropium-albuteroL (Duo-Neb) 0.5-2.5 mg/3 mL nebulizer solution 3 mL  dexAMETHasone (Decadron) injection 6 mg  albumin human 25 % solution 25 g  epoetin di-epbx (Retacrit) injection 10,000 Units  lidocaine PF (Xylocaine) 20 mg/mL (2 %) injection  potassium chloride CR (Klor-Con) ER tablet 10 mEq  albumin human 5 % infusion 25 g  potassium chloride CR (Klor-Con M20) ER tablet 20 mEq  norepinephrine (Levophed) 8 mg in dextrose 5% 250 mL (0.032 mg/mL) infusion (premix)  oxygen (O2) therapy  piperacillin-tazobactam-dextrose (Zosyn) IV 2.25 g  heparin 1,000 unit/mL injection 2,000 Units  heparin 1,000 unit/mL injection 2,000 Units  potassium  chloride 20 mEq in 100 mL IV premix  fidaxomicin (Dificid) tablet 200 mg  norepinephrine (Levophed) 8 mg in dextrose 5% 250 mL (0.032 mg/mL) infusion (premix)  heparin 1,000 unit/mL injection 2,000 Units  heparin 1,000 unit/mL injection 2,000 Units  HYDROcodone-acetaminophen (Norco) 5-325 mg per tablet 1 tablet  dextrose 50 % injection 25 g  glucagon (Glucagen) injection 1 mg  dextrose 10 % in water (D10W) infusion  potassium chloride (Klor-Con) packet 20 mEq  potassium chloride 20 mEq in 100 mL IV premix  cosyntropin (Cortrosyn) injection 250 mcg  heparin 1,000 unit/mL injection 2,000 Units  heparin 1,000 unit/mL injection 2,000 Units  heparin 1,000 unit/mL injection 2,000 Units  heparin 1,000 unit/mL injection 2,000 Units  cholestyramine (Questran) 4 gram packet 4 g  potassium phosphate (monobasic) (K-Phos) tablet 500 mg  vancomycin (Vancocin) capsule 125 mg  heparin 1,000 unit/mL injection 2,000 Units  heparin 1,000 unit/mL injection 2,000 Units  hydrocortisone sod succ (PF) (Solu-CORTEF) injection 100 mg  insulin lispro (HumaLOG) injection 0-10 Units  insulin glargine (Lantus) injection 6 Units  hydrocortisone sod succ (PF) (Solu-CORTEF) injection 50 mg  vancomycin (Vancocin) capsule 500 mg  hydrocortisone sod succ (PF) (Solu-CORTEF) injection 50 mg  ipratropium-albuteroL (Duo-Neb) 0.5-2.5 mg/3 mL nebulizer solution 3 mL  acetylcysteine (Mucomyst) 200 mg/mL (20 %) nebulizer solution 600 mg  budesonide (Pulmicort) 0.5 mg/2 mL nebulizer solution 0.25 mg  hydrocortisone sod succ (PF) (Solu-CORTEF) injection 50 mg      Relevant Results  Labs  Results from last 72 hours   Lab Units 01/02/24  0503   WBC AUTO x10*3/uL 7.8   HEMOGLOBIN g/dL 9.6*   HEMATOCRIT % 25.7*   PLATELETS AUTO x10*3/uL 272   LYMPHO PCT MAN % 23.0   MONO PCT MAN % 2.0   EOSINO PCT MAN % 0.0     Results from last 72 hours   Lab Units 01/02/24  0504   SODIUM mmol/L 135   POTASSIUM mmol/L 3.7   CHLORIDE mmol/L 99   CO2 mmol/L 26   BUN mg/dL 23    CREATININE mg/dL 3.00*   GLUCOSE mg/dL 202*   CALCIUM mg/dL 8.6   ANION GAP mmol/L 10   EGFR mL/min/1.73m*2 16*   PHOSPHORUS mg/dL 2.4*     Results from last 72 hours   Lab Units 01/02/24  0917 01/02/24  0504   PROTEIN TOTAL g/dL 6.0*  --    ALBUMIN g/dL  --  2.7*     Estimated Creatinine Clearance: 15.7 mL/min (A) (by C-G formula based on SCr of 3 mg/dL (H)).  C-Reactive Protein   Date Value Ref Range Status   12/25/2023 5.20 (H) 0.00 - 2.00 mg/dL Final     CRP   Date Value Ref Range Status   06/23/2023 19.9 (H) 0 - 2.0 MG/DL Final     Comment:     Performed at [address] [address]   05/22/2022 1.0 0 - 2.0 MG/DL Final     Comment:     Performed at [address] [address]     Microbiology  Reviewed  Imaging  XR chest 1 view    Result Date: 12/26/2023  Interpreted By:  Rikki Patel, STUDY: XR CHEST 1 VIEW; 12/26/2023 12:23 pm   INDICATION: Signs/Symptoms:hypoxia   COMPARISON: 12/19/2023   ACCESSION NUMBER(S): [id number]   ORDERING CLINICIAN: BRITTANI TOLEDO   FINDINGS: The study is limited due to rotation. Dialysis catheter is stable in position. The cardiac silhouette is indeterminate due to the technique. There is interval significant worsening of large left effusion and left lung atelectasis/infiltrates, with complete opacification of the left hemithorax. There is no pneumothorax. Vascular stents are noted in subclavian vessels. Surgical staples are again seen in the left axilla. The osseous structures are unchanged.       Limited study. Interval worsening of large left pleural effusion and left lung infiltrates/atelectasis, with complete opacification of the left hemithorax.   Signed by: Rikki Patel 12/26/2023 12:40 PM Dictation workstation:   [id number]    US thoracentesis    Result Date: 12/20/2023  Interpreted By:  Todd La, STUDY: US THORACENTESIS;  12/15/2023 3:43 pm   INDICATION: Signs/Symptoms:L pleural effusion.   COMPARISON: Chest  x-rayDated 12/14/2023.   ACCESSION NUMBER(S): [id number]   ORDERING CLINICIAN: PARIS LUCAS   TECHNIQUE: INTERVENTIONALIST(S): Todd La M.D.   CONSENT: The patient/patient's POA/next of kin was informed of the nature of the proposed procedure. The purposes, alternatives, risks, and benefits were explained and discussed. All questions were answered and consent was obtained.   SEDATION: None   MEDICATION/CONTRAST: No additional   TIME OUT: A time out was performed immediately prior to procedure start with the interventional team, correctly identifying the patient name, date of birth, MRN, procedure, anatomy (including marking of site and side), patient position, procedure consent form, relevant laboratory and imaging test results, antibiotic administration, safety precautions, and procedure-specific equipment needs.   FINDINGS: The patient was placed in the sitting position.   The pleural space was examined with grey scale ultrasound, and the most accessible fluid identified and marked for thoracentesis.   The skin was prepped and draped in usual manner. Local anesthesia with Lidocaine was administered and a  left-sided thoracentesis was performed.  A 5 Greenlandic One-Step thoracentesis needle/catheter was then placed where marked.  Approximately 600 mL of yellowish colored fluid was removed.  The needle/catheter was then withdrawn.   The patient tolerated the procedure well and there were no immediate complications. Specimen(s) sent to the laboratory and pathology for further evaluation, per the requesting team.       Uneventful  left-sided thoracentesis, as detailed above.   I personally performed and/or directly supervised this study and was present for the entire procedure.   I personally reviewed the study and resident interpretation. I agree with the findings as stated.   Performed and dictated at [facility].   MACRO: None   Signed by: Todd La 12/20/2023 9:48 PM  Dictation workstation:   [id number]    XR chest 1 view    Result Date: 12/19/2023  Interpreted By:  Judd Lee, STUDY: XR CHEST 1 VIEW; 12/19/2023 5:44 am   INDICATION: Signs/Symptoms:pleural effusion.   COMPARISON: December 14, 2023   ACCESSION NUMBER(S): [id number]   ORDERING CLINICIAN: PARIS LUCAS   FINDINGS: RESULT: Right sided central venous catheter is again noted extending into the right atrium. The distal tip is not well visualized. Left-sided vascular stents are noted in the left subclavian region. Left axillary surgical clips are noted. The cardiac silhouette is within normal limits for size. Mediastinal contours are unremarkable. Left basilar opacity obscures the left hemidiaphragm with hazy opacity throughout the left lung. There are degenerative changes of the thoracic spine and shoulders.       Persistent left basilar opacity obscures the left hemidiaphragm with hazy opacity extending through the left hemithorax. Findings likely represent pleural effusion atelectasis. Underlying infiltrate or mass is not excluded. Overall findings not significantly changed compared to previous exam     Signed by: Judd Lee 12/19/2023 9:02 AM Dictation workstation:   [id number]    XR chest 1 view    Result Date: 12/14/2023  Interpreted By:  Judd Lee, STUDY: XR CHEST 1 VIEW; 12/14/2023 10:43 am   INDICATION: Hypoxic respiratory failure.   COMPARISON: December 8, 2023   ACCESSION NUMBER(S): [id number]   ORDERING CLINICIAN: RODRI SURESH   FINDINGS: RESULT: Right-sided double-lumen dialysis catheter is noted with distal tip at the level of the right atrium. Vascular stents are noted in the left subclavian region. Surgical clips are noted in the left axilla. Cardiac silhouette size is indeterminate as the left heart border is obscured. There are calcifications involving the thoracic aorta. Left basilar obscurity obscures the left hemidiaphragm and left heart border with hazy opacity extending  to the left mid lung. There are degenerative changes of the bilateral shoulders.       Increasing density in the left mid and lower lung suggests increasing infiltrate or pleural effusion.     Signed by: Judd Lee 12/14/2023 10:58 AM Dictation workstation:   [id number]    Vascular US ankle brachial index (OCTAVIO) without exercise    Result Date: 12/13/2023           [place] [id number]            Phone [phone]  Vascular Lab Report  [facility] US ANKLE BRACHIAL INDEX (OCTAVIO) WITHOUT EXERCISE Patient Name:      BASIA Gray Physician: [id number] Lizet Messer MD Study Date:        12/11/2023          Ordering Provider: [id number] MARIANN SUTTON MRN/PID:           [id number]            Fellow: Accession#:        [id number]        Technologist:      Luz Maria Trivedi RVT Date of Birth/Age: 1950 / 73 years Technologist 2:    Dara Parr RVT Gender:            F                   Encounter#:        [id number] Admission Status:  Inpatient           Location           [facility]                                        Performed:  Diagnosis/ICD: Other specified symptoms and signs involving the circulatory and                respiratory systems-R09.89 Indication:    Peripheral vascular disease CPT Codes:     88585.52 Peripheral artery OCTAVIO Only Reduced Service  Pertinent History: Absent pedal pulses. Patient is on dialysis.  CONCLUSIONS: Right Lower PVR: Evidence of moderate arterial occlusive disease in the right lower extremity at rest. Right pressures of >220 mmHg suggest no compressibility of vessels and may make absolute Segmental Limb Pressures (SLP) unreliable. Decreased digital perfusion noted. Biphasic flow is noted in the right dorsalis pedis artery. Left Lower PVR: Evidence of moderate arterial occlusive disease in the left lower extremity at rest. Left pressures of >220 mmHg suggest no  compressibility of vessels and may make absolute Segmental Limb Pressures (SLP) unreliable. Decreased digital perfusion noted. Biphasic flow is noted in the left dorsalis pedis artery. Additional Findings: Technically difficult and limited exam due to patient's positioning, movement and inability to cooperate with exam.  Imaging & Doppler Findings:  RIGHT Lower PVR              Pressures Right Dorsalis Pedis (Ankle) 255 mmHg   LEFT Lower PVR              Pressures Left Dorsalis Pedis (Ankle) 255 mmHg   [id number] Lizet Messer MD Electronically signed by [id number] Lizet Messer MD on 12/13/2023 at 8:38:56 AM  ** Final **     CT head wo IV contrast    Result Date: 12/11/2023  Interpreted By:  Brendon Hook, STUDY: CT HEAD WO IV CONTRAST; 12/11/2023 5:20 pm   INDICATION: Signs/Symptoms:acute confusion.   COMPARISON: 20 August 2023   ACCESSION NUMBER(S): [id number]   ORDERING CLINICIAN: RODRI SURESH   TECHNIQUE: CT was performed with one or more of the following dose reduction techniques: automated exposure control, adjustment of the mA and/or kV according to patient size, or use of iterative reconstruction technique.       PROCEDURE: 3.0 mm axial images were obtained through the brain, to include the posterior fossa without intravenous contrast enhancement.   FINDINGS: The patient's head is turned to the left and slightly canted in the gantry. Mild motion artifact is seen. There is symmetric volume loss of the cerebral hemispheres. Moderate  decreased attenuation in the bilateral periventricular white matter, consistent with microangiopathy is noted.  There is no encephalomalacic change. Brainstem is unremarkable. Cerebellar hemispheres are symmetric. No subarachnoid, intraparenchymal, subdural or interventricular hemorrhage. No intra- or extra-axial mass or abnormal blood products are demonstrated. Hyperostosis frontalis interna is seen. Slight hypoplasia of the left mastoid.   The paranasal sinuses and mastoids as well as  calvarium are unremarkable.       1. No acute intracranial findings. 2. Symmetric volume loss of the cerebral hemispheres. 3. Periventricular Microangiopathy. 4. No posttraumatic abnormality.   This report has been produced using speech recognition. This exam is available in DICOM format to non-affiliated healthcare facilities on a secure media free searchable basis with prior patient authorization. The patient exposure is reported to a radiation dose index registry. All CT examinations are performed with one or more of the following dose reduction techniques: Automated Exposure Control, Adjustment of mA and/or KV according to patient size, or use of iterative reconstruction techniques.   MACRO: None   Signed by: Brendon Hook 12/11/2023 7:05 PM Dictation workstation:   [id number]    US thoracentesis    Result Date: 12/8/2023  Interpreted By:  Sandro Hernandez, STUDY: US THORACENTESIS; 12/7/2023 3:16 pm   INDICATION: Signs/Symptoms:Left pleural effusion question of whether this needs thoracentesis, history of ESRD, just had dialysis today.   COMPARISON: None   ACCESSION NUMBER(S): [id number]   ORDERING CLINICIAN: JEZ LONG   TECHNIQUE: Informed consent obtained. Patient positionedsitting upright. Skin prepped, draped and anesthetized. Under ultrasound guidance, a centesis catheter/needle was advanced into rightpleural cavity.   FINDINGS: A total of 750 cc of clear yellow fluid was aspirated. A sample was sent for analysis. The patient tolerated the procedure well.       Ultrasound-guided left thoracentesis.     Signed by: Sandro Hernandez 12/8/2023 3:01 PM Dictation workstation:   [id number]    XR chest 1 view    Result Date: 12/8/2023  Interpreted By:  Maria Garcia, STUDY: XR CHEST 1 VIEW 12/8/2023 11:57 am   INDICATION: Signs/Symptoms:S/P thoracentesis   COMPARISON: 12/06/2023   ACCESSION NUMBER(S): [id number]   ORDERING CLINICIAN: JEZ LONG   TECHNIQUE: AP erect view of the chest at bedside    FINDINGS: There is an interval decrease in size of left pleural effusion when compared with the study done 2 days earlier. No pneumothorax is seen.   There is some residual left pleural effusion with infiltrates seen in the left mid and lower lung field. The right lung is clear.   There are surgical clips within the left axilla. Stent grafts are visible in the left subclavian region extending into the left axillary region. A tunneled right jugular dialysis catheter terminates within right atrium.       Decreased size of left effusion following ultrasound-guided thoracentesis with no pneumothorax.   There is some residual left pleural effusion noted with infiltrate in the left mid and lower lung field.   Signed by: Maria Garcia 12/8/2023 12:51 PM Dictation workstation:   [id number]    XR foot 3+ views bilateral    Result Date: 12/7/2023  Interpreted By:  Brendon Hook, STUDY: XR FOOT 3+ VIEWS BILATERAL; 12/7/2023 3:31 pm   INDICATION: Signs/Symptoms:Wound/Abscess/Acute neha process rule out/Comparative Exam.   COMPARISON: None   ACCESSION NUMBER(S): [id number]   ORDERING CLINICIAN: JOSSELYN STAHL   TECHNIQUE: Nonweightbearing AP, lateral and oblique views of the bilateral foot were performed.   FINDINGS: Right foot: Large heel spur is seen. Mild Achilles enthesopathy demonstrated. Moderate atherosclerotic disease is seen. There is suggestion of loss of the arch on this nonweightbearing view. Degenerative change anterior plafond demonstrated and dorsal talus as well as posterior plafond and dorsal talus. Sclerosis anterior calcaneus is seen. Degenerative changes mild between the midfoot and forefoot. 1st metatarsophalangeal joint degenerative changes demonstrated. 2nd ray absent phalanges. Distal phalanges of the 3rd through 5th rays with overlap were difficult to visualize. Distal phalanx of the great toe is difficult to visualize.   Left foot: Large heel spur is seen. Advanced atherosclerotic disease  demonstrated. Degenerative change in the calcaneus and talus seen with moderate anterior and mild posterior plafond degenerative change. Mild degenerative change in the midfoot and forefoot is seen. Distal phalanx of the great toe is absent. Degenerative change of the 2nd ray phalanges and 3rd ray phalanges seen with degenerative change of the 4th and 5th digits not well seen.       1. Right foot: Large heel spur. Atherosclerotic disease. Suggestion of diffuse osteopenia. No sclerosis to suggest osteomyelitis. No subcutaneous air to suggest gangrene. Absent flanges 2nd ray with difficult to visualized distal phalanges of the other digits.   2. Left foot: Large heel spur. Degenerative changes of the hindfoot midfoot and forefoot with atherosclerotic disease. Absent distal phalanx of the great toe. No periostitis or abnormal sclerosis with diffuse osteopenia to suggest osteomyelitis. No subcutaneous air to suggest gangrene.       Signed by: Brendon Hook 12/7/2023 8:39 PM Dictation workstation:   [id number]    ECG 12 Lead    Result Date: 12/7/2023   Poor data quality, interpretation may be adversely affected Normal sinus rhythm Right bundle branch block Septal infarct , age undetermined Possible Lateral infarct , age undetermined Abnormal ECG No previous ECGs available Confirmed by Hammad Callahan ([id number]) on 12/7/2023 1:07:11 PM    XR chest 1 view    Result Date: 12/6/2023  Interpreted By:  Roger Murdock, STUDY: XR CHEST 1 VIEW;  12/6/2023 2:08 pm   INDICATION: Signs/Symptoms:Brief AMS and hypotension, now resolved.   COMPARISON: 08/21/2023..   ACCESSION NUMBER(S): [id number]   ORDERING CLINICIAN: PATEL TINSLEY   FINDINGS: New moderate left pleural effusion. There is silhouetting of the left heart border. Left subclavian stent present. Right-sided double-lumen central venous catheter terminates within the right ventricle. No acute osseous findings.       New, moderate left pleural effusion.     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-completed IV vancomycin, now on Doxycycline-at risk for recurrence-has a right chest wall dialysis catheter  ESRD on hemodialysis  Severe malnutrition-prealbumin of 3.2           Dificid-completed  Oral vancomycin-4 times a day till 1/10/2024, followed by weekly tapering off to 3 times a day, twice daily, once a day-plan long-term suppressive therapy  Oral doxycycline as suppressive therapy for MRSA bacteremia with mitral valve infective endocarditis  Monitor temperature  and WBC  Local care  Offloading  High-protein diet  Monitor stool  Podiatry follow up  Contact plus precautions-c.diff      Stephen Coulter MD

## 2024-01-05 NOTE — CARE PLAN
Problem: Diabetes  Goal: Achieve decreasing blood glucose levels by end of shift  Outcome: Progressing  Goal: Increase stability of blood glucose readings by end of shift  Outcome: Progressing  Goal: Maintain electrolyte levels within acceptable range throughout shift  Outcome: Progressing  Goal: Maintain glucose levels >70mg/dl to <250mg/dl throughout shift  Outcome: Progressing  Goal: No changes in neurological exam by end of shift  Outcome: Progressing  Goal: Learn about and adhere to nutrition recommendations by end of shift  Outcome: Progressing  Goal: Vital signs within normal range for age by end of shift  Outcome: Progressing  Goal: Increase self care and/or family involovement by end of shift  Outcome: Progressing  Goal: Receive DSME education by end of shift  Outcome: Progressing     Problem: Pain - Adult  Goal: Verbalizes/displays adequate comfort level or baseline comfort level  Outcome: Progressing     Problem: Safety - Adult  Goal: Free from fall injury  Outcome: Progressing     Problem: Discharge Planning  Goal: Discharge to home or other facility with appropriate resources  Outcome: Progressing     Problem: Chronic Conditions and Co-morbidities  Goal: Patient's chronic conditions and co-morbidity symptoms are monitored and maintained or improved  Outcome: Progressing     Problem: Skin  Goal: Decreased wound size/increased tissue granulation at next dressing change  Outcome: Progressing  Flowsheets (Taken 1/5/2024 0433)  Decreased wound size/increased tissue granulation at next dressing change: Promote sleep for wound healing  Goal: Participates in plan/prevention/treatment measures  Outcome: Progressing  Flowsheets (Taken 1/5/2024 0433)  Participates in plan/prevention/treatment measures: Elevate heels  Goal: Prevent/manage excess moisture  Outcome: Progressing  Flowsheets (Taken 1/5/2024 0433)  Prevent/manage excess moisture: Cleanse incontinence/protect with barrier cream  Goal: Prevent/minimize  sheer/friction injuries  Outcome: Progressing  Flowsheets (Taken 1/5/2024 0433)  Prevent/minimize sheer/friction injuries:   Complete micro-shifts as needed if patient unable. Adjust patient position to relieve pressure points, not a full turn   Turn/reposition every 2 hours/use positioning/transfer devices   Use pull sheet  Goal: Promote/optimize nutrition  Outcome: Progressing  Flowsheets (Taken 1/5/2024 0433)  Promote/optimize nutrition: Monitor/record intake including meals  Goal: Promote skin healing  Outcome: Progressing  Flowsheets (Taken 1/5/2024 0433)  Promote skin healing:   Assess skin/pad under line(s)/device(s)   Turn/reposition every 2 hours/use positioning/transfer devices   Rotate device position/do not position patient on device     Problem: Fall/Injury  Goal: Not fall by end of shift  Outcome: Progressing  Goal: Be free from injury by end of the shift  Outcome: Progressing  Goal: Verbalize understanding of personal risk factors for fall in the hospital  Outcome: Progressing  Goal: Verbalize understanding of risk factor reduction measures to prevent injury from fall in the home  Outcome: Progressing  Goal: Pace activities to prevent fatigue by end of the shift  Outcome: Progressing     Problem: Nutrition  Goal: Oral intake greater than 50%  Outcome: Progressing  Goal: Oral intake greater 75%  Outcome: Progressing  Goal: Consume prescribed supplement  Outcome: Progressing  Goal: Adequate PO fluid intake  Outcome: Progressing  Goal: Nutrition support goals are met within 48 hrs  Outcome: Progressing  Goal: Nutrition support is meeting 75% of nutrient needs  Outcome: Progressing  Goal: Lab values WNL  Outcome: Progressing  Goal: Electrolytes WNL  Outcome: Progressing  Goal: Promote healing  Outcome: Progressing  Goal: Maintain stable weight  Outcome: Progressing  Goal: Reduce weight from edema/fluid  Outcome: Progressing  Goal: Gradual weight gain  Outcome: Progressing     Problem: Respiratory  Goal:  Clear secretions with interventions this shift  Outcome: Progressing  Goal: Minimize anxiety/maximize coping throughout shift  Outcome: Progressing  Goal: Minimal/no exertional discomfort or dyspnea this shift  Outcome: Progressing  Goal: No signs of respiratory distress (eg. Use of accessory muscles. Peds grunting)  Outcome: Progressing  Goal: Patent airway maintained this shift  Outcome: Progressing  Goal: Verbalize decreased shortness of breath this shift  Outcome: Progressing  Goal: Tolerate pulmonary toileting this shift  Outcome: Progressing  Goal: Wean oxygen to maintain O2 saturation per order/standard this shift  Outcome: Progressing     Problem: Pain  Goal: My pain/discomfort is manageable  Outcome: Progressing     Problem: Safety  Goal: Patient will be injury free during hospitalization  Outcome: Progressing  Goal: I will remain free of falls  Outcome: Progressing     Problem: Daily Care  Goal: Daily care needs are met  Outcome: Progressing     Problem: Psychosocial Needs  Goal: Demonstrates ability to cope with hospitalization/illness  Outcome: Progressing  Goal: Collaborate with me, my family, and caregiver to identify my specific goals  Outcome: Progressing     Problem: Discharge Barriers  Goal: My discharge needs are met  Outcome: Progressing     Problem: Pain  Goal: Takes deep breaths with improved pain control throughout the shift  Outcome: Progressing  Goal: Turns in bed with improved pain control throughout the shift  Outcome: Progressing  Goal: Walks with improved pain control throughout the shift  Outcome: Progressing  Goal: Performs ADL's with improved pain control throughout shift  Outcome: Progressing  Goal: Participates in PT with improved pain control throughout the shift  Outcome: Progressing   The patient's goals for the shift include maintain comfort    The clinical goals for the shift include Patient to turn and reposition to redistrubute weight, improving wound healing    Over the  shift, the patient did not make progress toward the following goals. Barriers to progression include incontinence. Recommendations to address these barriers include assist patient with incontinence and encourage change in position to promote wound healing.

## 2024-01-05 NOTE — PROGRESS NOTES
Ayanna Gross is a 73 y.o. female on day 30 of admission presenting with Hypotension, unspecified hypotension type.      [facility] .  Dr. Madrigal secure messaged. Patient to have dialysis today.                         Luz Maria Hamilton RN

## 2024-01-05 NOTE — PROGRESS NOTES
"Ayanna Gross is a 73 y.o. female on day 30 of admission presenting with Hypotension, unspecified hypotension type.      Subjective  Patient in no acute distress on bedside exam  Eating breakfast  Alert and oriented x 3 on my exam  Denies chest pain or shortness of breath  No nausea or vomiting  Downgraded to MCU    Objective    Last Recorded Vitals      1/5/2024     1:45 AM 1/5/2024     2:00 AM 1/5/2024     2:15 AM 1/5/2024     2:30 AM 1/5/2024     2:45 AM 1/5/2024     6:39 AM 1/5/2024     7:19 AM   Vitals   Systolic      98 79   Diastolic      63 61   Heart Rate 82 81 84 84 83 81 83   Temp      36 °C (96.8 °F) 37 °C (98.6 °F)   Resp 15 16 16 21 19 12 17   Height (in)      1.575 m (5' 2\")    Weight (lb)      160.5    BMI      29.35 kg/m2    BSA (m2)      1.78 m2        Imaging  Electrocardiogram, 12-lead PRN ACS symptoms    Result Date: 1/5/2024  Sinus rhythm with Premature atrial complexes with Aberrant conduction Right bundle branch block Septal infarct (cited on or before 06-DEC-2023) Abnormal ECG When compared with ECG of 06-DEC-2023 13:06, Aberrant conduction is now Present Questionable change in initial forces of Septal leads      Relevant Results  Results for orders placed or performed during the hospital encounter of 12/06/23 (from the past 24 hour(s))   POCT GLUCOSE   Result Value Ref Range    POCT Glucose 99 74 - 99 mg/dL   POCT GLUCOSE   Result Value Ref Range    POCT Glucose 186 (H) 74 - 99 mg/dL   POCT GLUCOSE   Result Value Ref Range    POCT Glucose 220 (H) 74 - 99 mg/dL   POCT GLUCOSE   Result Value Ref Range    POCT Glucose 100 (H) 74 - 99 mg/dL   Electrocardiogram, 12-lead PRN ACS symptoms   Result Value Ref Range    Ventricular Rate 89 BPM    Atrial Rate 89 BPM    ND Interval 208 ms    QRS Duration 140 ms    QT Interval 490 ms    QTC Calculation(Bazett) 596 ms    P Axis 83 degrees    R Axis 166 degrees    T Axis 127 degrees    QRS Count 15 beats    Q Onset 229 ms    P Onset 125 ms    P Offset 172 " ms    T Offset 474 ms    QTC Fredericia 558 ms       Physical Exam  Constitutional:       Appearance: Normal appearance.   HENT:      Head: Normocephalic and atraumatic.      Nose: Nose normal.   Eyes:      Extraocular Movements: Extraocular movements intact.      Conjunctiva/sclera: Conjunctivae normal.   Cardiovascular:      Rate and Rhythm: Regular rhythm.      Heart sounds: Normal heart sounds.   Pulmonary:      Breath sounds: Decreased breath sounds present.   Abdominal:      General: Bowel sounds are normal.      Palpations: Abdomen is soft.   Skin:     Comments: Bilateral heel eschar    Neurological:      Mental Status: She is alert and oriented to person, place, and time.     No results found for the last 90 days.      Assessment/Plan   Shock-etiology unclear- resolved  Unstageable Bilateral heel ulcers  C-difficile infection-diarrhea has resolved  Type 2 diabetes mellitus with peripheral angiopathy with gangrene   acute respiratory failure-resolved  Coronavirus infection  Pleural effusion, s/p thoracentesis  History of chronic MRSA bacteremia, mitral valve infective endocarditis in August 2023-completed IV vancomycin, now on Doxycycline-at risk for recurrence-has a right chest wall dialysis catheter  ESRD on hemodialysis  Severe malnutrition-prealbumin of 3.2           Dificid-completed  Oral vancomycin-4 times a day till 1/10/2024, followed by weekly tapering off to 3 times a day, twice daily, once a day-plan long-term suppressive therapy  Oral doxycycline as suppressive therapy for MRSA bacteremia with mitral valve infective endocarditis  Monitor temperature  and WBC  Local care  Offloading  High-protein diet  Monitor stool  Podiatry follow up  Contact plus precautions-c.diff

## 2024-01-05 NOTE — NURSING NOTE
[facility], no answer and no option to leave voicemail. Will continue to try to call report. Per Care Transitions nurse note, liaison at [facility] aware of transport time.

## 2024-01-05 NOTE — DISCHARGE SUMMARY
Discharge Diagnosis  Hypotension, unspecified hypotension type    Issues Requiring Follow-Up  Hypotension  T2DM  B/L heel ulcers - needs follow up with podiatry    Discharge Meds     Your medication list        START taking these medications        Instructions Last Dose Given Next Dose Due   doxycycline 100 mg capsule  Commonly known as: Vibramycin  Start taking on: January 6, 2024      Take 1 capsule (100 mg) by mouth once daily. Take with at least 8 ounces (large glass) of water, do not lie down for 30 minutes after Do not start before January 6, 2024.       insulin lispro 100 unit/mL injection  Commonly known as: HumaLOG      Inject 0-0.1 mL (0-10 Units) under the skin 3 times a day with meals. Take as directed per insulin instructions.       vancomycin 250 mg capsule  Commonly known as: Vancocin      Take 2 capsules (500 mg) by mouth 4 times a day for 5 days. Followed by weekly tapering off to 3 times day, then twice a day. Then plan long term suppressive therapy once daily indefinitely.              CHANGE how you take these medications        Instructions Last Dose Given Next Dose Due   gabapentin 100 mg capsule  Commonly known as: Neurontin  What changed:   medication strength  how much to take  additional instructions      Take 1 capsule (100 mg) by mouth 2 times a day.       midodrine 5 mg tablet  Commonly known as: Proamatine  What changed:   medication strength  how much to take  how to take this  when to take this  additional instructions      Take 3 tablets (15 mg) by mouth 3 times a day with meals.              CONTINUE taking these medications        Instructions Last Dose Given Next Dose Due   blood sugar diagnostic strip           Dexcom G6 Sensor device  Generic drug: blood-glucose sensor      Check blood sugar when needed and as instructed       Eliquis 2.5 mg tablet  Generic drug: apixaban           febuxostat 40 mg tablet  Commonly known as: Uloric           fenofibrate 160 mg tablet  Commonly  known as: Triglide           Lexapro 10 mg tablet  Generic drug: escitalopram           Nyamyc 100,000 unit/gram powder  Generic drug: nystatin      APPLY 1 GRAM TO SKIN TWO TIMES A DAY       simvastatin 20 mg tablet  Commonly known as: Zocor                  STOP taking these medications      calcium acetate 667 mg capsule  Commonly known as: Phoslo        isosorbide mononitrate ER 60 mg 24 hr tablet  Commonly known as: Imdur        metoprolol succinate XL 25 mg 24 hr tablet  Commonly known as: Toprol-XL        NON FORMULARY        Carla-Brii 0.8 mg tablet  Generic drug: B complex-vitamin C-folic acid        Tradjenta 5 mg tablet  Generic drug: linaGLIPtin                  Where to Get Your Medications        These medications were sent to [id number] - [place], [place] - [address]  [address] [address] [facility] [address]      Phone: [phone]   vancomycin 250 mg capsule       Information about where to get these medications is not yet available    Ask your nurse or doctor about these medications  doxycycline 100 mg capsule  gabapentin 100 mg capsule  insulin lispro 100 unit/mL injection  midodrine 5 mg tablet         Test Results Pending At Discharge  Pending Labs       Order Current Status    Cold Agglutinin; [facility]; [id number] - Miscellaneous Test In process    Cold agglutinin titer In process    Serum Protein Electrophoresis + Immunofixation In process    Serum Protein Electrophoresis + Immunofixation In process            Hospital Course   73yoF who presented from her dialysis center for hypotension. Noted to have B/L heel ulcers. Nephrology, ID and podiatry consulted. Given heel wounds, concern for peripheral vascular disease. Vascular surgery was consulted, deemed not a good surgical candidate. No additional intervention per podiatry as well. Developed diarrhea after initiation of antibiotics, found to be positive for C. Diff (PCR positive, negative toxin). Started on PO vanc and doxycycline  for hx of MRSA bacteremia. Hospital course complicated by persistent asymptomatic hypotension, requiring ICU transfer and intermittent pressor support. Midodrine was increased and patient was given a trial of stress dose steroids. Pressor support and steroids were weaned off. Also incidentally found to be COVID19 positive, remained asymptomatic and did not require COVID specific treatment. Diarrhea improved. Cleared for discharge by nephrology and ID. Will discharge on PO vanc taper and PO doxycycline per ID. Medically stable for discharge to SNF.     Pertinent Physical Exam At Time of Discharge  Physical Exam    Outpatient Follow-Up  Future Appointments   Date Time Provider Department Center   1/17/2024  8:30 AM MADHURI Pinzon-CNP [id number] [facility]   10/8/2024  8:45 AM Nick Ford MD [id number] [facility]     Time spent on discharge: 35 minutes    Yvonne Madrigal MD

## 2024-01-05 NOTE — PROGRESS NOTES
Ayanna Gross is a 73 y.o. female on day 30 of admission presenting with Hypotension, unspecified hypotension type.      Subjective   States she feels tired this morning. Denies any specific complaints. Would like to go to rehab       Objective     Last Recorded Vitals  BP (!) 79/48 (BP Location: Right arm, Patient Position: Lying)   Pulse 82   Temp 35.7 °C (96.3 °F) (Temporal)   Resp 17   Wt 72.8 kg (160 lb 7.9 oz)   SpO2 99%   Intake/Output last 3 Shifts:    Intake/Output Summary (Last 24 hours) at 1/5/2024 1128  Last data filed at 1/4/2024 1500  Gross per 24 hour   Intake 250 ml   Output --   Net 250 ml       Admission Weight  Weight: 72.6 kg (160 lb) (12/06/23 1301)    Daily Weight  01/05/24 : 72.8 kg (160 lb 7.9 oz)    Image Results  Electrocardiogram, 12-lead PRN ACS symptoms  Sinus rhythm with Premature atrial complexes with Aberrant conduction  Right bundle branch block  Septal infarct (cited on or before 06-DEC-2023)  Abnormal ECG  When compared with ECG of 06-DEC-2023 13:06,  Aberrant conduction is now Present  Questionable change in initial forces of Septal leads      Physical Exam  Constitutional:       General: She is not in acute distress.     Appearance: She is ill-appearing (chronically). She is not toxic-appearing.   HENT:      Head: Normocephalic and atraumatic.      Mouth/Throat:      Pharynx: Oropharynx is clear.   Eyes:      General: No scleral icterus.  Cardiovascular:      Rate and Rhythm: Normal rate and regular rhythm.   Pulmonary:      Effort: No respiratory distress.      Breath sounds: No wheezing.   Abdominal:      General: There is no distension.      Palpations: Abdomen is soft.   Musculoskeletal:      Right lower leg: No edema.      Left lower leg: No edema.   Neurological:      Mental Status: She is alert.   Psychiatric:         Mood and Affect: Mood normal.         Behavior: Behavior normal.         Relevant Results               Assessment/Plan        This patient has a  central line   Reason for the central line remaining today? Dialysis/Hemapheresis        Principal Problem:    Hypotension, unspecified hypotension type  Active Problems:    Paroxysmal atrial fibrillation (CMS/HCC)    Peripheral vascular disease (CMS/HCC)    Orthostatic hypotension    Mixed hyperlipidemia    End-stage renal disease on hemodialysis (CMS/[facility])    DM (diabetes mellitus) (CMS/[facility])    Absent pedal pulses    Septic Shock, resolved  Unstageable bilateral heel ulcers  C. Diff colitis  Hx of MRSA bacteremia, infective endocarditis  COVID19 infection  ID following  Continue suppressive doxycycline for hx of MRSA bacteremia/IE  Oral vancomycin-4 times a day till 1/10/2024, followed by weekly tapering off to 3 times a day, twice daily, once a day-plan long-term suppressive therapy   Stop steroids     Chronic hypotension  Continue midodrine  Keep SBP >70 per nephrology     ESRD  Nephrology following  HD per nephrology     Anemia of chronic disease  Cold agglutinins disease  Heme-onc following  Hgb stable     Pleural effusion   S/p thoracentesis - transudative  Pulm following     PVD  B/L heel ulcers  Follow up with podiatry as outpatient     T2DM  Continue lantus and SSI       Dispo: plan for discharge to SNF today after dialysis              Yvonne Madrigal MD

## 2024-01-05 NOTE — PROGRESS NOTES
Ayanna Gross is a 73 y.o. female on day 30 of admission presenting with Hypotension, unspecified hypotension type.    Plan is to discharge to [facility] this evening at 1900 after dialysis. Fredy, liaison at [facility] notified of transport time.  Fredy also notified that per [facility], patient's chairtime has changed to 1330.  Patient will need transportation arranged for 1330 arrival at [facility] on M-W -F.  7000 has been completed.  Spouse Hossein notified via phone.  Attempted to call daughter Laura at [phone].  No answer.  Message left.                                    Luz Maria Hamilton, RN

## 2024-01-05 NOTE — PROGRESS NOTES
CONSULT PROGRESS NOTES    SERVICE DATE: 1/5/2024   SERVICE TIME: 1:36 PM    CONSULTING SERVICE: Nephrology    ASSESSMENT AND PLAN   73-year-old with numerous medical problems including end-stage renal disease admitted for hypotension, C. difficile colitis, recurrent pleural effusion, and COVID 19.  1.  End-stage renal disease  2.  Hypotension  3.  Fluid overload  4.  Anemia of chronic renal disease     I am obliging a thrice weekly hemodialysis schedule.  She remains on maximum dose midodrine.  We are targeting a systolic blood pressure above 70, as she is not uncommonly in the 70 systolic on outpatient dialysis.    She is due for hemodialysis today.    Use 1 L ultrafiltration with low temperature dialysate.  Use 3K bath.  She seems to be mentating at her baseline.     Continue supportive care.  Dr. Mcgee is available this weekend, but it looks like she will be discharged today.    SUBJECTIVE  INTERVAL HPI: She has no new issues.  Thinks her diarrhea has slowed.  She has no dyspnea.  She awaits dialysis today.  Awaits discharge after dialysis.  Blood pressure running about her usual range.    MEDICATIONS:  acetylcysteine, 3 mL, nebulization, 4x daily  apixaban, 2.5 mg, oral, BID  budesonide, 0.25 mg, nebulization, BID  collagenase, , Topical, Daily  doxycycline, 100 mg, oral, Daily  epoetin di or biosimilar, 10,000 Units, intravenous, Every Mon/Wed/Fri  escitalopram, 10 mg, oral, Daily  febuxostat, 40 mg, oral, Every other day  fenofibrate, 160 mg, oral, Daily  gabapentin, 100 mg, oral, BID  heparin, 2,000 Units, intra-catheter, After Dialysis  heparin, 2,000 Units, intra-catheter, After Dialysis  heparin, 2,000 Units, intra-catheter, After Dialysis  heparin, 2,000 Units, intra-catheter, After Dialysis  [START ON 1/6/2024] heparin, 2,000 Units, intra-catheter, After Dialysis  [START ON 1/6/2024] heparin, 2,000 Units, intra-catheter, After Dialysis  insulin glargine, 6 Units, subcutaneous, q24h  insulin lispro,  "0-10 Units, subcutaneous, TID with meals  ipratropium-albuteroL, 3 mL, nebulization, 4x daily  [Held by provider] metoprolol succinate XL, 12.5 mg, oral, Daily  midodrine, 15 mg, oral, TID with meals  nystatin, 1 Application, Topical, BID  simvastatin, 40 mg, oral, Nightly  vancomycin, 500 mg, oral, 4x daily           PRN medications: acetaminophen, acetaminophen, dextrose 10 % in water (D10W), dextrose, glucagon, HYDROcodone-acetaminophen, oxygen, polyethylene glycol, zinc oxide     OBJECTIVE  PHYSICAL EXAM:   Heart Rate:  [81-91]   Temp:  [35.7 °C (96.3 °F)-37 °C (98.6 °F)]   Resp:  [12-24]   BP: ()/(48-63)   Height:  [157.5 cm (5' 2\")]   Weight:  [72.8 kg (160 lb 7.9 oz)]   SpO2:  [95 %-100 %]   Body mass index is 29.35 kg/m².  This is a chronically ill-appearing mildly toxic obese white woman  Very pale skin  Hearing intact  Phonation intact  dry oral mucosa  Regular heart rate  Unlabored breathing  Abdomen is soft, nondistended, nontender, positive bowel sounds  No Amos catheter in place, no suprapubic tenderness to palpation  There is no significant lower extremity edema, left index finger amputation  Moves 4 limbs spontaneously  No obvious joint deformities  No lymphadenopathy  Right internal jugular tunneled hemodialysis catheter  She has failed fistula in her left upper extremity    DATA:   Labs:  Results for orders placed or performed during the hospital encounter of 12/06/23 (from the past 96 hour(s))   POCT GLUCOSE   Result Value Ref Range    POCT Glucose 140 (H) 74 - 99 mg/dL   POCT GLUCOSE   Result Value Ref Range    POCT Glucose 188 (H) 74 - 99 mg/dL   CBC and Auto Differential   Result Value Ref Range    WBC 7.8 4.4 - 11.3 x10*3/uL    nRBC 0.8 (H) 0.0 - 0.0 /100 WBCs    RBC 2.76 (L) 4.00 - 5.20 x10*6/uL    Hemoglobin 9.6 (L) 12.0 - 16.0 g/dL    Hematocrit 25.7 (L) 36.0 - 46.0 %    MCV 93 80 - 100 fL    MCH 34.8 (H) 26.0 - 34.0 pg    MCHC 37.4 (H) 32.0 - 36.0 g/dL    RDW 28.3 (H) 11.5 - 14.5 % "    Platelets 272 150 - 450 x10*3/uL    Immature Granulocytes %, Automated 1.2 (H) 0.0 - 0.9 %    Immature Granulocytes Absolute, Automated 0.09 0.00 - 0.50 x10*3/uL   Manual Differential   Result Value Ref Range    Neutrophils %, Manual 73.0 40.0 - 80.0 %    Lymphocytes %, Manual 23.0 13.0 - 44.0 %    Monocytes %, Manual 2.0 2.0 - 10.0 %    Eosinophils %, Manual 0.0 0.0 - 6.0 %    Basophils %, Manual 0.0 0.0 - 2.0 %    Atypical Lymphocytes %, Manual 2.0 0.0 - 2.0 %    Seg Neutrophils Absolute, Manual 5.69 (H) 1.60 - 5.00 x10*3/uL    Lymphocytes Absolute, Manual 1.79 0.80 - 3.00 x10*3/uL    Monocytes Absolute, Manual 0.16 0.05 - 0.80 x10*3/uL    Eosinophils Absolute, Manual 0.00 0.00 - 0.40 x10*3/uL    Basophils Absolute, Manual 0.00 0.00 - 0.10 x10*3/uL    Atypical Lymphs Absolute, Manual 0.16 0.00 - 0.30 x10*3/uL    Total Cells Counted 100     RBC Morphology See Below     Polychromasia Mild     Target Cells Many     Ovalocytes Few     Bhavesh Cells Few    Renal function panel   Result Value Ref Range    Glucose 202 (H) 65 - 99 mg/dL    Sodium 135 133 - 145 mmol/L    Potassium 3.7 3.4 - 5.1 mmol/L    Chloride 99 97 - 107 mmol/L    Bicarbonate 26 24 - 31 mmol/L    Urea Nitrogen 23 8 - 25 mg/dL    Creatinine 3.00 (H) 0.40 - 1.60 mg/dL    eGFR 16 (L) >60 mL/min/1.73m*2    Calcium 8.6 8.5 - 10.4 mg/dL    Phosphorus 2.4 (L) 2.5 - 4.5 mg/dL    Albumin 2.7 (L) 3.5 - 5.0 g/dL    Anion Gap 10 <=19 mmol/L   POCT GLUCOSE   Result Value Ref Range    POCT Glucose 172 (H) 74 - 99 mg/dL   Lavender Top   Result Value Ref Range    Extra Tube Hold for add-ons.    TSH   Result Value Ref Range    Thyroid Stimulating Hormone 1.39 0.27 - 4.20 mIU/L   IgG, IgA, IgM   Result Value Ref Range    IgG 1,250 700 - 1,600 mg/dL    IgA 799 (H) 70 - 400 mg/dL    IgM 197 40 - 230 mg/dL   Protein, Total   Result Value Ref Range    Total Protein 6.0 (L) 6.4 - 8.2 g/dL   POCT GLUCOSE   Result Value Ref Range    POCT Glucose 202 (H) 74 - 99 mg/dL   POCT  GLUCOSE   Result Value Ref Range    POCT Glucose 96 74 - 99 mg/dL   POCT GLUCOSE   Result Value Ref Range    POCT Glucose 176 (H) 74 - 99 mg/dL   POCT GLUCOSE   Result Value Ref Range    POCT Glucose 114 (H) 74 - 99 mg/dL   POCT GLUCOSE   Result Value Ref Range    POCT Glucose 99 74 - 99 mg/dL   POCT GLUCOSE   Result Value Ref Range    POCT Glucose 195 (H) 74 - 99 mg/dL   POCT GLUCOSE   Result Value Ref Range    POCT Glucose 172 (H) 74 - 99 mg/dL   POCT GLUCOSE   Result Value Ref Range    POCT Glucose 106 (H) 74 - 99 mg/dL   POCT GLUCOSE   Result Value Ref Range    POCT Glucose 99 74 - 99 mg/dL   POCT GLUCOSE   Result Value Ref Range    POCT Glucose 186 (H) 74 - 99 mg/dL   POCT GLUCOSE   Result Value Ref Range    POCT Glucose 220 (H) 74 - 99 mg/dL   POCT GLUCOSE   Result Value Ref Range    POCT Glucose 100 (H) 74 - 99 mg/dL   Electrocardiogram, 12-lead PRN ACS symptoms   Result Value Ref Range    Ventricular Rate 89 BPM    Atrial Rate 89 BPM    ND Interval 208 ms    QRS Duration 140 ms    QT Interval 490 ms    QTC Calculation(Bazett) 596 ms    P Axis 83 degrees    R Axis 166 degrees    T Axis 127 degrees    QRS Count 15 beats    Q Onset 229 ms    P Onset 125 ms    P Offset 172 ms    T Offset 474 ms    QTC Fredericia 558 ms   POCT GLUCOSE   Result Value Ref Range    POCT Glucose 116 (H) 74 - 99 mg/dL   CBC and Auto Differential   Result Value Ref Range    WBC 10.7 4.4 - 11.3 x10*3/uL    nRBC 0.4 (H) 0.0 - 0.0 /100 WBCs    RBC 3.56 (L) 4.00 - 5.20 x10*6/uL    Hemoglobin 10.2 (L) 12.0 - 16.0 g/dL    Hematocrit 32.3 (L) 36.0 - 46.0 %    MCV 91 80 - 100 fL    MCH 28.7 26.0 - 34.0 pg    MCHC 31.6 (L) 32.0 - 36.0 g/dL    RDW 24.7 (H) 11.5 - 14.5 %    Platelets 264 150 - 450 x10*3/uL    Neutrophils % 67.5 40.0 - 80.0 %    Immature Granulocytes %, Automated 0.9 0.0 - 0.9 %    Lymphocytes % 19.3 13.0 - 44.0 %    Monocytes % 7.2 2.0 - 10.0 %    Eosinophils % 4.9 0.0 - 6.0 %    Basophils % 0.2 0.0 - 2.0 %    Neutrophils  Absolute 7.24 (H) 1.60 - 5.50 x10*3/uL    Immature Granulocytes Absolute, Automated 0.10 0.00 - 0.50 x10*3/uL    Lymphocytes Absolute 2.07 0.80 - 3.00 x10*3/uL    Monocytes Absolute 0.77 0.05 - 0.80 x10*3/uL    Eosinophils Absolute 0.53 (H) 0.00 - 0.40 x10*3/uL    Basophils Absolute 0.02 0.00 - 0.10 x10*3/uL   Renal function panel   Result Value Ref Range    Glucose 134 (H) 65 - 99 mg/dL    Sodium 137 133 - 145 mmol/L    Potassium 3.0 (L) 3.4 - 5.1 mmol/L    Chloride 98 97 - 107 mmol/L    Bicarbonate 28 24 - 31 mmol/L    Urea Nitrogen 24 8 - 25 mg/dL    Creatinine 3.30 (H) 0.40 - 1.60 mg/dL    eGFR 14 (L) >60 mL/min/1.73m*2    Calcium 8.3 (L) 8.5 - 10.4 mg/dL    Phosphorus 3.1 2.5 - 4.5 mg/dL    Albumin 2.7 (L) 3.5 - 5.0 g/dL    Anion Gap 11 <=19 mmol/L   Morphology   Result Value Ref Range    RBC Morphology No significant RBC morphology present     Polychromasia Mild     Target Cells Many          SIGNATURE: Saeed Mondragon MD PATIENT NAME: Ayanna Gross   DATE: January 5, 2024 MRN: [id number]   TIME: 1:36 PM PAGER: [phone]

## 2024-01-05 NOTE — CARE PLAN
The patient's goals for the shift include maintain comfort    The clinical goals for the shift include safety, skin protection    Over the shift, the patient did not make progress toward the following goals. Barriers to progression include none. Recommendations to address these barriers include assist with ADLs  Problem: Diabetes  Goal: Achieve decreasing blood glucose levels by end of shift  Outcome: Progressing  Goal: Increase stability of blood glucose readings by end of shift  Outcome: Progressing  Goal: Maintain electrolyte levels within acceptable range throughout shift  Outcome: Progressing  Goal: Maintain glucose levels >70mg/dl to <250mg/dl throughout shift  Outcome: Progressing  Goal: No changes in neurological exam by end of shift  Outcome: Progressing  Goal: Learn about and adhere to nutrition recommendations by end of shift  Outcome: Progressing  Goal: Vital signs within normal range for age by end of shift  Outcome: Progressing  Goal: Increase self care and/or family involovement by end of shift  Outcome: Progressing  Goal: Receive DSME education by end of shift  Outcome: Progressing     Problem: Pain - Adult  Goal: Verbalizes/displays adequate comfort level or baseline comfort level  Outcome: Progressing     Problem: Safety - Adult  Goal: Free from fall injury  Outcome: Progressing     Problem: Discharge Planning  Goal: Discharge to home or other facility with appropriate resources  Outcome: Progressing     Problem: Chronic Conditions and Co-morbidities  Goal: Patient's chronic conditions and co-morbidity symptoms are monitored and maintained or improved  Outcome: Progressing     Problem: Skin  Goal: Decreased wound size/increased tissue granulation at next dressing change  Outcome: Progressing  Goal: Participates in plan/prevention/treatment measures  Outcome: Progressing  Goal: Prevent/manage excess moisture  Outcome: Progressing  Goal: Prevent/minimize sheer/friction injuries  Outcome:  Progressing  Flowsheets (Taken 1/5/2024 9089)  Prevent/minimize sheer/friction injuries:   Complete micro-shifts as needed if patient unable. Adjust patient position to relieve pressure points, not a full turn   HOB 30 degrees or less   Use pull sheet  Goal: Promote/optimize nutrition  Outcome: Progressing  Goal: Promote skin healing  Outcome: Progressing     Problem: Fall/Injury  Goal: Not fall by end of shift  Outcome: Progressing  Goal: Be free from injury by end of the shift  Outcome: Progressing  Goal: Verbalize understanding of personal risk factors for fall in the hospital  Outcome: Progressing  Goal: Verbalize understanding of risk factor reduction measures to prevent injury from fall in the home  Outcome: Progressing  Goal: Pace activities to prevent fatigue by end of the shift  Outcome: Progressing     Problem: Nutrition  Goal: Oral intake greater than 50%  Outcome: Progressing  Goal: Oral intake greater 75%  Outcome: Progressing  Goal: Consume prescribed supplement  Outcome: Progressing  Goal: Adequate PO fluid intake  Outcome: Progressing  Goal: Nutrition support goals are met within 48 hrs  Outcome: Progressing  Goal: Nutrition support is meeting 75% of nutrient needs  Outcome: Progressing  Goal: Lab values WNL  Outcome: Progressing  Goal: Electrolytes WNL  Outcome: Progressing  Goal: Promote healing  Outcome: Progressing  Goal: Maintain stable weight  Outcome: Progressing  Goal: Reduce weight from edema/fluid  Outcome: Progressing  Goal: Gradual weight gain  Outcome: Progressing     Problem: Respiratory  Goal: Clear secretions with interventions this shift  Outcome: Progressing  Goal: Minimize anxiety/maximize coping throughout shift  Outcome: Progressing  Goal: Minimal/no exertional discomfort or dyspnea this shift  Outcome: Progressing  Goal: No signs of respiratory distress (eg. Use of accessory muscles. Peds grunting)  Outcome: Progressing  Goal: Patent airway maintained this shift  Outcome:  Progressing  Goal: Verbalize decreased shortness of breath this shift  Outcome: Progressing  Goal: Tolerate pulmonary toileting this shift  Outcome: Progressing  Goal: Wean oxygen to maintain O2 saturation per order/standard this shift  Outcome: Progressing     Problem: Pain  Goal: My pain/discomfort is manageable  Outcome: Progressing     Problem: Safety  Goal: Patient will be injury free during hospitalization  Outcome: Progressing  Goal: I will remain free of falls  Outcome: Progressing     Problem: Daily Care  Goal: Daily care needs are met  Outcome: Progressing     Problem: Psychosocial Needs  Goal: Demonstrates ability to cope with hospitalization/illness  Outcome: Progressing  Goal: Collaborate with me, my family, and caregiver to identify my specific goals  Outcome: Progressing     Problem: Discharge Barriers  Goal: My discharge needs are met  Outcome: Progressing     Problem: Pain  Goal: Takes deep breaths with improved pain control throughout the shift  Outcome: Progressing  Goal: Turns in bed with improved pain control throughout the shift  Outcome: Progressing  Goal: Walks with improved pain control throughout the shift  Outcome: Progressing  Goal: Performs ADL's with improved pain control throughout shift  Outcome: Progressing  Goal: Participates in PT with improved pain control throughout the shift  Outcome: Progressing   .

## 2024-01-05 NOTE — CARE PLAN
Problem: Respiratory  Goal: Clear secretions with interventions this shift  Outcome: Progressing  Goal: Minimize anxiety/maximize coping throughout shift  Outcome: Progressing  Goal: Minimal/no exertional discomfort or dyspnea this shift  Outcome: Progressing  Goal: No signs of respiratory distress (eg. Use of accessory muscles. Peds grunting)  Outcome: Progressing  Goal: Patent airway maintained this shift  Outcome: Progressing  Goal: Verbalize decreased shortness of breath this shift  Outcome: Progressing  Goal: Tolerate pulmonary toileting this shift  Outcome: Progressing  Goal: Wean oxygen to maintain O2 saturation per order/standard this shift  Outcome: Progressing

## 2024-01-06 NOTE — NURSING NOTE
Patient had just left with transport. She had a bowel movement and needed a bed change on their arrival. Belongings packed and loaded onto personal wheel chair

## 2024-01-09 NOTE — LETTER
Patient: Ayanna Gross  : 1950    Encounter Date: 2024    Subjective  Patient ID: Ayanna Gross is a 73 y.o. female who is acute skilled care and presents for initial visit for skilled nursing.    This is admission H&P.  Was hospitalized with hypotension at dialysis, MRSA bacteremia, treated with vancomycin and to continue doxycycline on discharge.  She was treated with IV pressors and midodrine.  Also developed diarrhea, due to C. difficile, treated with vancomycin.  She had bilateral heel ulcers, evaluated by vascular surgeon, no procedure was recommended.  She is alert, pleasant, denies any shortness of breath.  She is complaining of occasional abdominal pain, still has loose stools.  Appetite fair.  She requires assistance with transfers and ambulation         Review of Systems   Constitutional:  Negative for fever and unexpected weight change.   HENT:  Negative for sore throat.    Respiratory:  Negative for cough and shortness of breath.    Cardiovascular:  Negative for chest pain and palpitations.   Gastrointestinal:  Negative for abdominal pain, constipation, diarrhea, nausea and vomiting.   Genitourinary:  Negative for difficulty urinating and frequency.   Musculoskeletal:  Positive for arthralgias. Negative for back pain.   Skin:  Negative for rash.   Neurological:  Negative for dizziness, seizures and headaches.   Psychiatric/Behavioral:  Negative for agitation. The patient is not nervous/anxious.        Objective  There were no vitals taken for this visit.    Physical Exam  Vitals reviewed.   Constitutional:       General: She is not in acute distress.  HENT:      Mouth/Throat:      Mouth: Mucous membranes are moist.   Cardiovascular:      Rate and Rhythm: Regular rhythm.      Heart sounds: Normal heart sounds.   Pulmonary:      Breath sounds: Normal breath sounds. No rales.   Abdominal:      Palpations: Abdomen is soft.      Tenderness: There is no abdominal tenderness.   Musculoskeletal:          General: No tenderness.      Cervical back: Neck supple. No tenderness.   Skin:     General: Skin is warm.      Comments: Bilateral stage IV heel ulcers with no drainage   Neurological:      General: No focal deficit present.      Mental Status: She is alert.   Psychiatric:         Behavior: Behavior normal.         Assessment/Plan  Diagnoses and all orders for this visit:  Difficulty in walking  Primary hypertension  Chronic combined systolic and diastolic heart failure (CMS/HCC)  Type 2 diabetes mellitus with other specified complication, with long-term current use of insulin (CMS/[facility])  Chronic kidney disease (CKD), stage IV (severe) (CMS/HCC)  Peripheral vascular disease (CMS/[facility])     Goals    None     PT and OT evaluation, fall precautions.  Will check labs.  Will arrange for ongoing dialysis.  Wound care for bilateral heel ulcers      Electronically Signed By: Deisy Conley MD   3/18/24 11:43 AM

## 2024-01-12 NOTE — LETTER
Patient: Ayanna Gross  : 1950    Encounter Date: 2024    Subjective  Patient ID: Ayanna Gross is a 73 y.o. female who is acute skilled care and presents for skilled nursing.    She is alert, pleasant, denies any shortness of breath. Appetite fair.  She is c/o loose stools and abdominal discomfort, will continue vancomycin for C. difficile colitis         Review of Systems   Constitutional:  Negative for fever and unexpected weight change.   HENT:  Negative for sore throat.    Respiratory:  Negative for cough and shortness of breath.    Cardiovascular:  Negative for chest pain and palpitations.   Gastrointestinal:  Negative for abdominal pain, constipation, diarrhea, nausea and vomiting.   Genitourinary:  Negative for difficulty urinating and frequency.   Musculoskeletal:  Positive for arthralgias. Negative for back pain.   Skin:  Negative for rash.   Neurological:  Negative for dizziness, seizures and headaches.   Psychiatric/Behavioral:  Negative for agitation. The patient is not nervous/anxious.        Objective  There were no vitals taken for this visit.    Physical Exam  Vitals reviewed.   Constitutional:       General: She is not in acute distress.  HENT:      Mouth/Throat:      Mouth: Mucous membranes are moist.   Cardiovascular:      Rate and Rhythm: Regular rhythm.      Heart sounds: Normal heart sounds.   Pulmonary:      Breath sounds: Normal breath sounds. No rales.   Abdominal:      Palpations: Abdomen is soft.      Tenderness: There is no abdominal tenderness.   Musculoskeletal:         General: No tenderness.      Cervical back: Neck supple. No tenderness.   Skin:     General: Skin is warm.      Comments: Bilateral stage IV heel ulcers with no drainage   Neurological:      General: No focal deficit present.      Mental Status: She is alert.   Psychiatric:         Behavior: Behavior normal.       Diagnoses and all orders for this visit:  C. difficile colitis (Primary)  Primary  hypertension  Stage 3 chronic kidney disease, unspecified whether stage 3a or 3b CKD (CMS/[facility])  Depression, unspecified depression type  Obstructive sleep apnea  Will continue vancomycin, PT and OT      Electronically Signed By: Deisy Conley MD   3/28/24  9:28 PM

## 2024-01-16 NOTE — LETTER
Patient: Ayanna Gross  : 1950    Encounter Date: 2024    Subjective  Patient ID: Ayanna Gross is a 73 y.o. female who is acute skilled care and presents for skilled nursing.    She is alert, pleasant, denies any shortness of breath and pain. Appetite okay. She participates in physical therapy         Review of Systems   Constitutional:  Negative for fever and unexpected weight change.   HENT:  Negative for sore throat.    Respiratory:  Negative for cough and shortness of breath.    Cardiovascular:  Negative for chest pain and palpitations.   Gastrointestinal:  Negative for abdominal pain, constipation, diarrhea, nausea and vomiting.   Genitourinary:  Negative for difficulty urinating and frequency.   Musculoskeletal:  Positive for arthralgias. Negative for back pain.   Skin:  Negative for rash.   Neurological:  Negative for dizziness, seizures and headaches.   Psychiatric/Behavioral:  Negative for agitation. The patient is not nervous/anxious.        Objective  There were no vitals taken for this visit.    Physical Exam  Vitals reviewed.   Constitutional:       General: She is not in acute distress.  HENT:      Mouth/Throat:      Mouth: Mucous membranes are moist.   Cardiovascular:      Rate and Rhythm: Regular rhythm.      Heart sounds: Normal heart sounds.   Pulmonary:      Breath sounds: Normal breath sounds. No rales.   Abdominal:      Palpations: Abdomen is soft.      Tenderness: There is no abdominal tenderness.   Musculoskeletal:         General: No tenderness.      Cervical back: Neck supple. No tenderness.   Skin:     General: Skin is warm.      Comments: Bilateral stage IV heel ulcers with no drainage   Neurological:      General: No focal deficit present.      Mental Status: She is alert.   Psychiatric:         Behavior: Behavior normal.       Diagnoses and all orders for this visit:  Paroxysmal atrial fibrillation (CMS/[facility]) (Primary)  Type 2 diabetes mellitus with other specified  complication, with long-term current use of insulin (CMS/[facility])  Mixed anxiety and depressive disorder  Obstructive sleep apnea          Electronically Signed By: Deisy Conley MD   3/28/24  9:30 PM

## 2024-01-19 PROBLEM — D62 ANEMIA DUE TO BLOOD LOSS, ACUTE: Status: ACTIVE | Noted: 2024-01-01

## 2024-01-19 NOTE — ED NOTES
Spoke with dialysis staff. States patient never came in for her scheduled appointment and therefore did not get dialysis today. She is on a MWF schedule. Also, per dialysis facility her blood pressures normally run very low even before dialysis - last appointment she was 62/38 before dialysis.      Shantel Curiel RN  01/19/24 [id number]

## 2024-01-19 NOTE — CARE PLAN
Per dtr Laura, pt has POA; jose Ackerman is going to bring the document to the hospital    ADOD: 3 days    Information obtained from jose Sanchez (first contact listed) at [phone]  Per Laura, her mother is currently at [facility] for rehab; family may not want pt to return to [facility]  Pt is wheelchair bound, unable to transfer herself.   She lives at home with her  Hossein. They live in a 2 story home with a basement, ramp to enter the home. Pts bed and bath are on the 1st floor.  Pt wears readers, no hearing aids  Her PCP is Dr. JEANNIE Conley, and she uses [facility] in [place] for her meds; there are times that she cannot afford her meds and per jose Sanchez, the family assist with the cost.  She is being treated for depression, per Laura no known S. I.  She is a diabetic, no home 02.    DISCHARGE PLAN: AT THIS TIME THE PLAN IS TO RETURN TO [facility]-- THIS MAY CHANGE --FAMILY MAY DECIDE TO SWITCH SNF--DO NOT DISCHARGE PATIENT BEFORE SPEAKING WITH CARE COORDINATION.

## 2024-01-19 NOTE — PROGRESS NOTES
Attestation/Supervisory note for GRACE Alberto      The patient is a 73-year-old female presenting to the emergency department by EMS from dialysis for evaluation of shortness of breath.  The patient states that she does not know how long she has had shortness of breath but it has been a long time.  She does not use supplemental oxygen at the long-term care facility where she resides according to EMS.  They report that her oxygen saturation was between 84 and 94% on room air.  She states that they told her that she needed to come to the emergency room because of her shortness of breath.  She states that it is a chronic issue.  She denies any headache or visual changes.  No chest pain.  No palpitations.  No diaphoresis.  No abdominal pain.  No nausea or vomiting.  No diarrhea or constipation.  No urinary complaints.  All pertinent positives and negatives are recorded above.  All other systems reviewed and otherwise negative.  Vital signs with hypotension and a fever but otherwise within normal limits.  Physical exam with a well-nourished well-developed female with disheveled appearance but no evidence of acute distress.  HEENT exam with dry mucous membranes but otherwise unremarkable.  She does have some coarse breath sounds bilaterally.  Abdominal exam is benign.  She does have bilateral lower extremity pitting edema and some generalized weakness of all 4 extremities but has no gross motor, neurologic or vascular deficits on exam.        EKG with sinus tachycardia 106 bpm, right bundle branch block pattern, normal axis, normal ST segment, and a slight diffuse flattening of the T waves in the inferior and lateral leads      Oral acetaminophen ordered.  Cultures were obtained and IV fluids and IV antibiotics were ordered for the sepsis bundle.      Diagnostic labs with electrolyte imbalance, phonic renal failure, elevated troponin T, lactic acidosis, but the remainder of the diagnostic labs are pending at the time of my  departure.      Initial Troponin T 345.  Repeat Troponin T pending at the time of my departure.      COVID-19 testing negative      Initial lactic acid 2.3.  Repeat lactic pending at the time of my departure.      XR chest 1 view   Final Result   Improved aeration and appearance of the left lung superiorly however   there is a persistent large left pleural effusion and opacification   of the left lower lobe.        Signed by: Real Mendieta 1/19/2024 1:44 PM   Dictation workstation:   [id number]           Patient does not have any evidence of ischemia on EKG and does not have any events on telemetry.  The Troponin T is elevated and the repeat troponin T is pending.  The patient does have chronic renal failure and is dependent on dialysis.  The patient was hypotensive upon arrival to the emergency room but the staff at dialysis report that the patient is chronically hypotensive.  The patient normally presents to dialysis with a systolic blood pressure between 60 and 80 according to the staff at dialysis.  They report that it usually does improve somewhat during dialysis.  The patient reportedly did not receive her dialysis today because of her symptoms.  Chest x-ray shows a persistent large left pleural effusion and opacification of the left lower lung lobe.  Cultures were obtained and IV fluids and IV antibiotics were ordered but the full 30 cc/kg bolus of IV fluids was held due to her being dependent on dialysis and not receiving dialysis today.      GRACE Alberto will continue to manage the patient primarily.  Anticipate that the patient will need to be admitted for further management of her current symptoms and trending of her cardiac enzymes but will need to wait for the results of the labs to be available to admit the patient and consult with nephrology to arrange for her missed dialysis.      Impression/diagnosis  Dyspnea/HIGHTOWER  Fever  ESRD on HD  SIRS  5.  Elevated troponin T      Critical care time of  33  minutes billed for management of the sepsis bundle with initiation of IV fluids and initiation of IV antibiotics, obtainment of cultures, monitoring patient on telemetry, consultation with the patient regarding her results.  This time excludes time for billable procedures.      critical care time billed for by me is non concurrent with time billed for by GRACE Alberto      I personally saw the patient and made/approve the management plan and take responsibility for the patient management.      I independently interpreted the following study (S): EKG and diagnostic labs      I personally discussed the patient's management with the patient      I reviewed the results of the diagnostic labs and diagnostic imaging.  Formal radiology read was completed by the radiologist.      Mónica Barry MD

## 2024-01-19 NOTE — CARE PLAN
Pt came to the ED from Dialysis c/o SOB; she is currently at [facility] for rehab.  Plan is for her to return. Unknown at this time if pt will be admitted.

## 2024-01-19 NOTE — H&P
History Of Present Illness  Ayanna Gross is a 73 y.o. female presenting with anemia, fever worsening sacral and lower extremity wounds and concerns from the nursing facility for left pneumonia.  Patient presents to the emergency department found to be febrile she was hypotensive but responded to 500 cc fluid bolus she was found to have a hemoglobin of 6 she was begun and ordered for a single unit of blood transfusion will actually provide 2 units of packed cells additional IV fluids to complete her volume resuscitation given her fever clinical evidence of hypovolemia and infection with an elevated lactate of 2.3 she received a total of approximately 1800 mL of fluid she is DNR/DNI by advanced directives but ICU level care is acceptable her granddaughter questions disorder but it was confirmed and signed by her  who is her POA.  She will be admitted to the intensive care unit given her frail compromised state and high risk of morbidity and mortality necessitating IV antibiotics fluids blood transfusion and ultimately will receive dialysis likely tomorrow wound care consultation to general surgery has been requested as well as consultation with the intensivist infectious disease and nephrology wounds are cultured C. difficile is requested she remains on oral vancomycin for suppression given recent C. difficile infection in December she is initiated on pharmacy dosing of vancomycin and Zosyn 2.25 g IV every 6 hours.  She continues on Eliquis at this time for DVT prophylaxis as well as atrial fibrillation.  Total time of bedside evaluation of the patient coordination of care and initiation of volume and transfusion resuscitation is 60 minutes of critical care time in this acutely ill and chronically ill-appearing frail elderly female.     Past Medical History  Past Medical History:   Diagnosis Date    Asthma     CAD (coronary artery disease)     CHF (congestive heart failure) (CMS/[facility])     Chronic kidney  disease on chronic dialysis (CMS/[facility])     Hypertension     Personal history of diseases of the blood and blood-forming organs and certain disorders involving the immune mechanism     History of autoimmune disorder    Sleep apnea     Type 2 diabetes mellitus without complications (CMS/HCC) 09/15/2022    Diabetes mellitus       Surgical History  Past Surgical History:   Procedure Laterality Date    CARPAL TUNNEL RELEASE  11/18/2013    Neuroplasty Decompression Median Nerve At Carpal Tunnel    HAND SURGERY  11/18/2013    Hand Surgery                                                                                                                                                          MASTECTOMY, PARTIAL  04/10/2014    Right Breast Partial Mastectomy    MR HEAD ANGIO WO IV CONTRAST  8/29/2023    MR HEAD ANGIO WO IV CONTRAST [facility]    OTHER SURGICAL HISTORY  11/18/2013    Breast Reconstruction With Implant Prosthesis    OTHER SURGICAL HISTORY  11/18/2013    Thyroid Surgery Substernal Thyroidectomy Partial    OTHER SURGICAL HISTORY  11/18/2013    Modified Radical Mastectomy Left Breast    OTHER SURGICAL HISTORY  04/04/2022    Carpal tunnel surgery    OTHER SURGICAL HISTORY  04/04/2022    Foot surgery    OTHER SURGICAL HISTORY  04/04/2022    Hernia repair    SENTINEL LYMPH NODE BIOPSY  04/10/2014    [place] Lymph Node Biopsy    TONSILLECTOMY  11/18/2013    Tonsillectomy    UMBILICAL HERNIA REPAIR  11/18/2013    Umbilical Hernia Repair    US GUIDED PERCUTANEOUS PLACEMENT  6/29/2023    US GUIDED PERCUTANEOUS PLACEMENT [facility]        Social History  She reports that she has never smoked. She has never used smokeless tobacco. She reports that she does not currently use alcohol. She reports that she does not use drugs.    Family History  Family History   Problem Relation Name Age of Onset    Lymphoma Mother      Prostate cancer Father          passed away fabiana min 2017        Allergies  Patient  has no known allergies.    Review of Systems   Constitutional:  Positive for appetite change, fatigue and fever.   HENT:  Positive for trouble swallowing.    Eyes: Negative.    Respiratory:  Positive for shortness of breath.    Cardiovascular: Negative.    Gastrointestinal:  Positive for diarrhea.   Endocrine: Negative.    Genitourinary: Negative.    Musculoskeletal:  Positive for back pain and myalgias.   Skin:  Positive for pallor and wound.   Allergic/Immunologic: Negative.    Neurological:  Positive for weakness, light-headedness and numbness.   Hematological: Negative.    Psychiatric/Behavioral: Negative.     All other systems reviewed and are negative.       Physical Exam  Vitals and nursing note reviewed.   Constitutional:       Appearance: She is ill-appearing and toxic-appearing.   HENT:      Head: Atraumatic.      Mouth/Throat:      Mouth: Mucous membranes are dry.   Eyes:      Pupils: Pupils are equal, round, and reactive to light.   Cardiovascular:      Rate and Rhythm: Tachycardia present. Rhythm irregular.      Pulses: Normal pulses.      Heart sounds: Normal heart sounds.   Pulmonary:      Effort: Pulmonary effort is normal.      Breath sounds: Normal breath sounds.      Comments: Diminished breath sounds left chest lateral and posteriorly  Dialysis catheter right chest  Abdominal:      General: Bowel sounds are normal.      Palpations: Abdomen is soft.   Musculoskeletal:         General: Deformity present.      Cervical back: Normal range of motion and neck supple.   Skin:     General: Skin is warm and dry.      Capillary Refill: Capillary refill takes less than 2 seconds.      Findings: Rash present.      Comments: Unstageable sacral wound chronic lower extremity heel and ankle wounds with necrosis   Neurological:      General: No focal deficit present.      Mental Status: She is disoriented.      Comments: Nonambulatory   Psychiatric:         Mood and Affect: Mood normal.          Last Recorded  "Vitals  Blood pressure 102/62, pulse (!) 103, temperature 38 °C (100.4 °F), temperature source Temporal, resp. rate 20, height 1.575 m (5' 2\"), weight 74.3 kg (163 lb 12.8 oz), SpO2 96 %.    Relevant Results        XR chest 1 view 01/19/2024    Narrative  Interpreted By:  Real Mendieta,  STUDY:  XR CHEST 1 VIEW; 1/19/2024 1:38 pm    INDICATION:  Signs/Symptoms:dyspnea.    COMPARISON:  12/26/2023    ACCESSION NUMBER(S):  [id number]    ORDERING CLINICIAN:  EMELY GOLDSMITH    TECHNIQUE:  1 view of the chest was performed.    FINDINGS:  There may be a minimal right pleural effusion. Slight prominence of  the interstitium otherwise the right lung is clear. Improved  appearance of the left lung with improved aeration of the right upper  lobe. There is opacification of the left lower lobe possibly due to  large pleural effusion which is similar to the prior study. No  pneumothorax. Right-sided dual lumen central line catheter with tip  at the proximal atrium. The cardiomediastinal silhouette is within  normal limits. Left-sided subclavian stents are again noted.    Impression  Improved aeration and appearance of the left lung superiorly however  there is a persistent large left pleural effusion and opacification  of the left lower lobe.    Signed by: Real Mendieta 1/19/2024 1:44 PM  Dictation workstation:   [id number]   Scheduled medications  acetaminophen, 650 mg, oral, Once  aspirin, 325 mg, oral, Once  insulin lispro, 0-10 Units, subcutaneous, TID with meals      Continuous medications     PRN medications  PRN medications: dextrose 10 % in water (D10W), dextrose, glucagon, oxygen  Results for orders placed or performed during the hospital encounter of 01/19/24 (from the past 24 hour(s))   Sars-CoV-2 and Influenza A/B PCR   Result Value Ref Range    Flu A Result Not Detected Not Detected    Flu B Result Not Detected Not Detected    Coronavirus 2019, PCR Not Detected Not Detected   CBC and Auto Differential "   Result Value Ref Range    WBC 13.1 (H) 4.4 - 11.3 x10*3/uL    nRBC 0.0 0.0 - 0.0 /100 WBCs    RBC 2.19 (L) 4.00 - 5.20 x10*6/uL    Hemoglobin 6.6 (L) 12.0 - 16.0 g/dL    Hematocrit 21.4 (L) 36.0 - 46.0 %    MCV 98 80 - 100 fL    MCH 30.1 26.0 - 34.0 pg    MCHC 30.8 (L) 32.0 - 36.0 g/dL    RDW 24.8 (H) 11.5 - 14.5 %    Platelets 220 150 - 450 x10*3/uL    Neutrophils % 67.2 40.0 - 80.0 %    Immature Granulocytes %, Automated 0.6 0.0 - 0.9 %    Lymphocytes % 20.2 13.0 - 44.0 %    Monocytes % 10.0 2.0 - 10.0 %    Eosinophils % 1.4 0.0 - 6.0 %    Basophils % 0.6 0.0 - 2.0 %    Neutrophils Absolute 8.82 (H) 1.60 - 5.50 x10*3/uL    Immature Granulocytes Absolute, Automated 0.08 0.00 - 0.50 x10*3/uL    Lymphocytes Absolute 2.66 0.80 - 3.00 x10*3/uL    Monocytes Absolute 1.31 (H) 0.05 - 0.80 x10*3/uL    Eosinophils Absolute 0.19 0.00 - 0.40 x10*3/uL    Basophils Absolute 0.08 0.00 - 0.10 x10*3/uL   Serial Troponin, 2 Hour ([facility])   Result Value Ref Range    Troponin T, High Sensitivity 345 (HH) <=14 ng/L   BLOOD GAS LACTIC ACID, VENOUS   Result Value Ref Range    POCT Lactate, Venous 2.3 (H) 0.4 - 2.0 mmol/L   Comprehensive metabolic panel   Result Value Ref Range    Glucose 130 (H) 65 - 99 mg/dL    Sodium 131 (L) 133 - 145 mmol/L    Potassium 4.2 3.4 - 5.1 mmol/L    Chloride 93 (L) 97 - 107 mmol/L    Bicarbonate 28 24 - 31 mmol/L    Urea Nitrogen 15 8 - 25 mg/dL    Creatinine 2.90 (H) 0.40 - 1.60 mg/dL    eGFR 17 (L) >60 mL/min/1.73m*2    Calcium 8.0 (L) 8.5 - 10.4 mg/dL    Albumin 2.4 (L) 3.5 - 5.0 g/dL    Alkaline Phosphatase 107 35 - 125 U/L    Total Protein 5.3 (L) 5.9 - 7.9 g/dL    AST 30 5 - 40 U/L    Bilirubin, Total 1.0 0.1 - 1.2 mg/dL    ALT 13 5 - 40 U/L    Anion Gap 10 <=19 mmol/L   Morphology   Result Value Ref Range    RBC Morphology See Below     Polychromasia Mild     Target Cells Few     Ovalocytes Few    Blood Gas Lactic Acid, Venous   Result Value Ref Range    POCT Lactate, Venous 1.6 0.4 - 2.0 mmol/L    Protime-INR   Result Value Ref Range    Protime 13.9 (H) 9.3 - 12.7 seconds    INR 1.3 (H) 0.9 - 1.2   Iron and TIBC   Result Value Ref Range    Iron      UIBC      TIBC      % Saturation       No results found for the last 90 days.         Assessment/Plan   Principal Problem:    Anemia due to blood loss, acute      Unstageable sacral wound  Persistent diarrhea with history of C. difficile on chronic suppression  Left pleural effusion atelectasis  Lower extremity heel and ankle wounds with severe peripheral vascular disease  Question aspiration  End-stage renal disease requiring hemodialysis  Atrial fibrillation on Eliquis therapy  DNR CCA DNI by advanced directives ICU level care is acceptable  Sepsis with likely early septic shock secondary to autonomic instability and anemia likely sources are skin and wound infections versus pneumonia in the setting of chronic C. Difficile    Patient is admitted to the intensive care unit ultimately consultants including intensive care infectious disease nephrology and general surgery wound debridement panculture blood wound stool for C. difficile initiate IV vancomycin and IV Zosyn and continue oral vancomycin and midodrine require pressor support transfused total of 2 units packed cells monitor lactate level.  Patient is extremely high risk of morbidity and subsequent mortality given her frail state in the setting of acute anemia and sepsis.  Niccoli she also has evidence of severe protein calorie malnutrition and will require nutritional support to be assessed the morning       I spent 60 minutes in the professional and overall care of this patient.      Judd Tavera DO

## 2024-01-19 NOTE — ED PROVIDER NOTES
HPI   Chief Complaint   Patient presents with    Shortness of Breath     Patient with SOB and fever. Is very lethargic. Dialysis MWF but did not go today dt SOB and fever       HPI  See my MDM                  Delia Coma Scale Score: 13                  Patient History   Past Medical History:   Diagnosis Date    Asthma     CAD (coronary artery disease)     CHF (congestive heart failure) (CMS/[facility])     Chronic kidney disease on chronic dialysis (CMS/[facility])     Hypertension     Personal history of diseases of the blood and blood-forming organs and certain disorders involving the immune mechanism     History of autoimmune disorder    Sleep apnea     Type 2 diabetes mellitus without complications (CMS/[facility]) 09/15/2022    Diabetes mellitus     Past Surgical History:   Procedure Laterality Date    CARPAL TUNNEL RELEASE  11/18/2013    Neuroplasty Decompression Median Nerve At Carpal Tunnel    HAND SURGERY  11/18/2013    Hand Surgery                                                                                                                                                          MASTECTOMY, PARTIAL  04/10/2014    Right Breast Partial Mastectomy    MR HEAD ANGIO WO IV CONTRAST  8/29/2023    MR HEAD ANGIO WO IV CONTRAST [facility]    OTHER SURGICAL HISTORY  11/18/2013    Breast Reconstruction With Implant Prosthesis    OTHER SURGICAL HISTORY  11/18/2013    Thyroid Surgery Substernal Thyroidectomy Partial    OTHER SURGICAL HISTORY  11/18/2013    Modified Radical Mastectomy Left Breast    OTHER SURGICAL HISTORY  04/04/2022    Carpal tunnel surgery    OTHER SURGICAL HISTORY  04/04/2022    Foot surgery    OTHER SURGICAL HISTORY  04/04/2022    Hernia repair    SENTINEL LYMPH NODE BIOPSY  04/10/2014    [place] Lymph Node Biopsy    TONSILLECTOMY  11/18/2013    Tonsillectomy    UMBILICAL HERNIA REPAIR  11/18/2013    Umbilical Hernia Repair    US GUIDED PERCUTANEOUS PLACEMENT  6/29/2023    US GUIDED PERCUTANEOUS  PLACEMENT [facility]     Family History   Problem Relation Name Age of Onset    Lymphoma Mother      Prostate cancer Father          passed away fabiana min 2017     Social History     Tobacco Use    Smoking status: Never    Smokeless tobacco: Never   Substance Use Topics    Alcohol use: Not Currently    Drug use: Never       Physical Exam   ED Triage Vitals   Temp Pulse Resp BP   -- -- -- --      SpO2 Temp src Heart Rate Source Patient Position   -- -- -- --      BP Location FiO2 (%)     -- --       Physical Exam  CONSTITUTIONAL: Vital signs reviewed as charted, well-developed and in no distress  Eyes: Extraocular muscles are intact. Pupils equal round and reactive to light. Conjunctiva are pink.    ENT: Mucous membranes are moist. Tongue in the midline. Pharynx was without erythema or exudates, uvula midline  LUNGS: Breath sounds equal and clear to auscultation. Good air exchange, no wheezes rales or retractions, pulse oximetry is charted.  HEART: Regular rate and rhythm without murmur thrill or rub, strong tones, auscultation is normal.  ABDOMEN: Soft and nontender without guarding rebound rigidity or mass. Bowel sounds are present and normal in all quadrants. There is no palpable masses or aneurysms identified. No hepatosplenomegaly, normal abdominal exam.  Neuro: The patient is awake, alert and oriented ×3. Moving all 4 extremities and answering questions appropriately.   MUSCULOSKELETAL: The calves are nontender to palpation. Full gross active range of motion.   PSYCH: Awake alert oriented, normal mood and affect.  Skin:  Dry, normal color, warm to the touch, no rash present.      ED Course & MDM   Diagnoses as of 01/19/24 1741   Anemia due to chronic kidney disease, on chronic dialysis (CMS/[facility])   Shortness of breath   Fever, unspecified fever cause   Sepsis, due to unspecified organism, unspecified whether acute organ dysfunction present (CMS/[facility])   Anemia due to blood loss, acute       Medical  Decision Making  History obtained from: patient    Vital signs, nursing notes, current medications, past medical history, Surgical history, allergies, social history, family History were reviewed.         HPI:  Patient 73-year-old female presenting emergency room today from dialysis reportedly short of breath.  Reportedly pulse ox was 84 to 94%.  Patient has no complaints at this time.  Denies dizziness, chest pain, shortness of breath, or abdominal pain.  Denies nausea vomiting diarrhea.  Denies cough or congestion.        10 point ROS was reviewed and negative except Noted above in HPI.  DDX: as listed above      Medications administered during this visit (name and route): ###      MDM Summary/considerations:  EMERGENCY DEPARTMENT COURSE and DIFFERENTIAL DIAGNOSIS/MDM:    Vitals:    Vitals:    01/19/24 1345 01/19/24 1350 01/19/24 1430 01/19/24 1521   BP: (!) 56/33 86/54  102/62   BP Location:  Right arm  Right arm   Patient Position:  Lying  Lying   Pulse: 106  (!) 104    Resp: 19  20    Temp:       TempSrc:       SpO2: 96%  (!) 93%    Weight:       Height:               The patient presented with a chief complaint of shortness of breath. The differential diagnosis associated with this patient's presentation includes viral illness, CAD, myocardial event, pneumonia, pleural effusion, pulmonary edema. Our workup consisted of ordering/reviewing cardiac evaluation.         Diagnoses as of 01/19/24 1741   Anemia due to chronic kidney disease, on chronic dialysis (CMS/HCC)   Shortness of breath   Fever, unspecified fever cause   Sepsis, due to unspecified organism, unspecified whether acute organ dysfunction present (CMS/HCC)   Anemia due to blood loss, acute   I spoke with Dr. Tavera. We thoroughly discussed the history, physical exam, laboratory and imaging studies, as well as, emergency department course. Based upon that discussion, we've decided to admit for further observation and evaluation of their chest  pain.  As I have deemed necessary from their history, physical, and studies, I have considered and evaluated for the following diagnoses: ACUTE CORONARY SYNDROME, PERICARDIAL TAMPONADE, PNEUMOTHORAX, P ULMONARY EMBOLISM, and THORACIC DISSECTION.     Patient's EKG was nonischemic initial troponin in the setting of renal failure was 345.  Previously was in the 200s.  Patient did miss her dialysis treatment today.  Electrolytes are normal.  Was initially profoundly hypotensive which according to the dialysis center states is normal.  Generally her blood pressure with systolic in the 80s.  Time of admission systolic was 102.  Patient's hemoglobin noted to be 6.6.  She was typed and crossed for a unit of blood, consent was obtained.  Patient was admitted for further evaluation and care.  She was initially febrile, lactic 2.3, leukocytosis 13,000.  X-ray does show a large pleural effusion on the left side that is persistent.        Diagnostic tests considered but not performed: none        External records reviewed:    Outpatient PCP note from nephrology and dialysis clinic indicating patient's blood pressure normally runs systolic in the 80s        Diagnostics interpreted by me:    EKG(s) interpreted by my attending physician        Discussions with other clinicians:    lakediscussed: Hospitalist        Chronic conditions impacting care:    Within 5 minutes  (+3)        Social determinants of health affecting care:    none        ED Medications managed:    Medications   aspirin tablet 325 mg (has no administration in time range)   acetaminophen (Tylenol) tablet 650 mg (has no administration in time range)   cefepime (Maxipime) 1 g in dextrose 5 % 50 mL IV (0 g intravenous Stopped 1/19/24 1521)   sodium chloride 0.9 % bolus 2,229 mL (2,229 mL intravenous New Bag 1/19/24 1416)           Prescription drugs considered:  none                Critical Care: CRITICAL CARE NOTE     The patient was reevaluated/re-examined multiple  times during the visit. Critical care time includes management at bedside, discussion with other providers and consultants, family counseling and answering questions, and documentation. Care involves decision making of high complexity to assess, manipulate, and support vital organ system failure and/or to prevent further life threatening deterioration of the patient's condition. Failure to initiate these interventions on an urgent basis would likely result in sudden, clinically significant or life threatening deterioration in the patient's condition of  anemia, acute respiratory failure with hypoxia, fever, sepsis       Critical care time total at least 35 minutes of non concurrent critical care time provided by myself. This did not include any separate billable procedures.                This chart was completed using voice recognition transcription software. Please excuse any errors of transcription including grammatical, punctuation, syntax and spelling errors.  Please contact me with any questions regarding this chart.    Procedure  Procedures     MADHURI Nolasco-GRACE  01/19/24 3986

## 2024-01-20 PROBLEM — A41.9 SEPTIC SHOCK (MULTI): Status: ACTIVE | Noted: 2024-01-01

## 2024-01-20 PROBLEM — R65.21 SEPTIC SHOCK (MULTI): Status: ACTIVE | Noted: 2024-01-01

## 2024-01-20 NOTE — NURSING NOTE
2135:  in room to discuss change of code status with pt & daughter who is POA. Expressed to hospitalist that they would like to rescind DNR order and become a full code.     2140: Full code order in place. Pt has proper wristband on.     2300: Multiple failed attempts from this RN & another RN to get new IV access or draw labs with IV ultrasound.  Notified the hospitalist.    0025: Paged Hospitalist due to pts BP being 43/17(26). New orders received for fluid bolus and pressors if needed. Stated this RN will have to use Dialysis line if pressors are needed since the pt has poor vasculature and a central line is not an option at this moment.

## 2024-01-20 NOTE — PROCEDURES
Right Internal Jugular CENTRAL LINE PLACEMENT     Indication(s):  -Inadequate IV access  -Administration of vasoactive or caustic agents  -CVP monitoring    Site:  - Right internal jugular vein      Catheter: 8.5 Fr, 4 lumen, 20 cm radiopaque polyurethane    Sedation: None  Patient positioned supine +/- slight Trendelenburg.  Sterility: Chlorhexidine skin prep. Hat and mask on myself and assistant(s). Antiseptic hand foam. Sterile gown, gloves and drape.  Local anesthesia: 3 mL of 1% lidocaine subcutaneously.  Ultrasound-guided insertion:  -YES (with sterile ultrasound probe cover)    Seldinger technique with 18 Ga x 2.5 in introducer needle. Guidewire thread easily through introducer needle.  -Needle position confirmed to be intravenous by fall of blood to gravity within pressure transduction tubing.  -Guidewire position confirmed to be intravenous by visualization on ultrasound in short and long axis views.  Small skin incision adjacent to guidewire with #11 scalpel. 10 Fr tissue dilator over guidewire. Catheter thread easily over guidewire and guidewire removed. All ports aspirated for complete removal of air, then flushed with sterile NS.  Catheter secured with sutures @ 20 cm at skin.  -Biopatch and transparent film dressing.  -Transparent film dressing with CHG.  Sterility maintained throughout procedure. No complications. Patient tolerated well.    Chest x-ray.  -done. Catheter in acceptable central venous position. No PTX. (My read).    Proctored by Dr. Barreto  Critical Care [facility], [place]   Yusuf De La Garza PA-C

## 2024-01-20 NOTE — CONSULTS
Reason For Consult  ESRD    History Of Present Illness  73-year-old female with known history of end-stage renal disease.  She has chronic hypotension as well.  Is admitted overnight from her nursing home secondary to concerns for pneumonia.  She had missed her dialysis yesterday due to this.    Recitation was borderline hypotensive, initially responded to 500 cc fluid bolus but throughout the night has had rising pressor requirements.  On evaluation, patient is awake and alert, she is on about 15 mcg of Levophed     Past Medical History  She has a past medical history of Asthma, CAD (coronary artery disease), CHF (congestive heart failure) (CMS/[facility]), Chronic kidney disease on chronic dialysis (CMS/[facility]), Hypertension, Personal history of diseases of the blood and blood-forming organs and certain disorders involving the immune mechanism, Sleep apnea, and Type 2 diabetes mellitus without complications (CMS/[facility]) (09/15/2022).    Surgical History  She has a past surgical history that includes Other surgical history (11/18/2013); Carpal tunnel release (11/18/2013); Umbilical hernia repair (11/18/2013); Tonsillectomy (11/18/2013); Hand surgery (11/18/2013); Other surgical history (11/18/2013); Other surgical history (11/18/2013); Other surgical history (04/04/2022); Other surgical history (04/04/2022); Other surgical history (04/04/2022); Mastectomy, partial (04/10/2014); [place] lymph node biopsy (04/10/2014); US guided percutaneous placement (6/29/2023); and MR angio head wo IV contrast (8/29/2023).     Social History  She reports that she has never smoked. She has never used smokeless tobacco. She reports that she does not currently use alcohol. She reports that she does not use drugs.    Family History  Family History   Problem Relation Name Age of Onset    Lymphoma Mother      Prostate cancer Father          passed away fabiana min 2017        Allergies  Patient has no known allergies.    Review of Systems  Gen:  no fever, no weight loss  Cardiac: No chest pain  Pulm: no dyspnea or cough  GI: no abdominal pain, nausea or vomiting   no dysuria, urgemcy of LUTS  Neuro: no focal weakness  MSK: no joint pains        Physical Exam  Gen: NAD  HENT: atraumatic  Heart: RRR  Lungs: CTA  Abdomen: Soft  Ext; no edema  Neuro: non focal  Psych : AAO x 3            I&O 24HR    Intake/Output Summary (Last 24 hours) at 1/20/2024 1355  Last data filed at 1/20/2024 0848  Gross per 24 hour   Intake 705.41 ml   Output 25 ml   Net 680.41 ml       Vitals 24HR  Heart Rate:  []   Temp:  [36.1 °C (97 °F)-36.2 °C (97.2 °F)]   Resp:  [5-26]   BP: ()/(21-77)   SpO2:  [90 %-99 %]         Relevant Results  Results for orders placed or performed during the hospital encounter of 01/19/24 (from the past 24 hour(s))   CBC and Auto Differential   Result Value Ref Range    WBC      nRBC      RBC      Hemoglobin      Hematocrit      MCV      MCH      MCHC      RDW      Platelets      Neutrophils %      Immature Granulocytes %, Automated      Lymphocytes %      Monocytes %      Eosinophils %      Basophils %      Neutrophils Absolute      Immature Granulocytes Absolute, Automated      Lymphocytes Absolute      Monocytes Absolute      Eosinophils Absolute      Basophils Absolute     Serial Troponin, 2 Hour ([facility])   Result Value Ref Range    Troponin T, High Sensitivity 345 (HH) <=14 ng/L   Blood Culture    Specimen: Peripheral Venipuncture; Blood culture   Result Value Ref Range    Blood Culture Loaded on Instrument - Culture in progress    Blood Culture    Specimen: Peripheral Venipuncture; Blood culture   Result Value Ref Range    Blood Culture Loaded on Instrument - Culture in progress    BLOOD GAS LACTIC ACID, VENOUS   Result Value Ref Range    POCT Lactate, Venous 2.3 (H) 0.4 - 2.0 mmol/L   Comprehensive metabolic panel   Result Value Ref Range    Glucose 130 (H) 65 - 99 mg/dL    Sodium 131 (L) 133 - 145 mmol/L    Potassium 4.2 3.4 - 5.1 mmol/L     Chloride 93 (L) 97 - 107 mmol/L    Bicarbonate 28 24 - 31 mmol/L    Urea Nitrogen 15 8 - 25 mg/dL    Creatinine 2.90 (H) 0.40 - 1.60 mg/dL    eGFR 17 (L) >60 mL/min/1.73m*2    Calcium 8.0 (L) 8.5 - 10.4 mg/dL    Albumin 2.4 (L) 3.5 - 5.0 g/dL    Alkaline Phosphatase 107 35 - 125 U/L    Total Protein 5.3 (L) 5.9 - 7.9 g/dL    AST 30 5 - 40 U/L    Bilirubin, Total 1.0 0.1 - 1.2 mg/dL    ALT 13 5 - 40 U/L    Anion Gap 10 <=19 mmol/L   Morphology   Result Value Ref Range    RBC Morphology      Polychromasia      Target Cells      Ovalocytes     Prepare RBC: 1 Units   Result Value Ref Range    PRODUCT CODE N4114X81     Unit Number [id number]     Unit ABO O     Unit RH POS     XM INTEP COMP     Dispense Status XM     Blood Expiration Date February 16, 2024 23:59 EST     PRODUCT BLOOD TYPE 5100     UNIT VOLUME 400    Blood Gas Lactic Acid, Venous   Result Value Ref Range    POCT Lactate, Venous 1.6 0.4 - 2.0 mmol/L   Troponin T   Result Value Ref Range    Troponin T, High Sensitivity 331 (HH) <=14 ng/L   Type and screen   Result Value Ref Range    ABO TYPE O     Rh TYPE POS     ANTIBODY SCREEN NEG    Protime-INR   Result Value Ref Range    Protime 13.9 (H) 9.3 - 12.7 seconds    INR 1.3 (H) 0.9 - 1.2   Iron and TIBC   Result Value Ref Range    Iron 21 (L) 30 - 160 ug/dL    UIBC 76 (L) 110 - 370 ug/dL    TIBC 97 (L) 228 - 428 ug/dL    % Saturation 22 12 - 50 %   Magnesium   Result Value Ref Range    Magnesium 1.50 (L) 1.60 - 3.10 mg/dL   Prepare RBC: 1 Units   Result Value Ref Range    PRODUCT CODE [id number]     Unit Number [id number]     Unit ABO O     Unit RH POS     XM INTEP COMP     Dispense Status XM     Blood Expiration Date February 15, 2024 23:59 EST     PRODUCT BLOOD TYPE 5100     UNIT VOLUME 350    Urinalysis with Reflex Culture and Microscopic   Result Value Ref Range    Color, Urine Dark-Orange (N) Light-Yellow, Yellow, Dark-Yellow    Appearance, Urine Ex.Turbid (N) Clear    Specific Gravity, Urine  1.027 1.005 - 1.035    pH, Urine 6.5 5.0, 5.5, 6.0, 6.5, 7.0, 7.5, 8.0    Protein, Urine 300 (3+) (A) NEGATIVE, 10 (TRACE), 20 (TRACE) mg/dL    Glucose, Urine Normal Normal mg/dL    Blood, Urine 1.0 (3+) (A) NEGATIVE    Ketones, Urine NEGATIVE NEGATIVE mg/dL    Bilirubin, Urine NEGATIVE NEGATIVE    Urobilinogen, Urine Normal Normal mg/dL    Nitrite, Urine NEGATIVE NEGATIVE    Leukocyte Esterase, Urine 500 Sharmin/µL (A) NEGATIVE   Extra Urine Gray Tube   Result Value Ref Range    Extra Tube Hold for add-ons.    Microscopic Only, Urine   Result Value Ref Range    WBC, Urine >50 (A) 1-5, NONE /HPF    WBC Clumps, Urine MANY Reference range not established. /HPF    RBC, Urine >20 (A) NONE, 1-2, 3-5 /HPF    Bacteria, Urine 4+ (A) NONE SEEN /HPF   BLOOD GAS LACTIC ACID, VENOUS   Result Value Ref Range    POCT Lactate, Venous 2.1 (H) 0.4 - 2.0 mmol/L   POCT GLUCOSE   Result Value Ref Range    POCT Glucose 145 (H) 74 - 99 mg/dL   POCT GLUCOSE   Result Value Ref Range    POCT Glucose 116 (H) 74 - 99 mg/dL   Renal Function Panel   Result Value Ref Range    Glucose 138 (H) 65 - 99 mg/dL    Sodium 135 133 - 145 mmol/L    Potassium 4.5 3.4 - 5.1 mmol/L    Chloride 96 (L) 97 - 107 mmol/L    Bicarbonate 23 (L) 24 - 31 mmol/L    Urea Nitrogen 19 8 - 25 mg/dL    Creatinine 3.40 (H) 0.40 - 1.60 mg/dL    eGFR 14 (L) >60 mL/min/1.73m*2    Calcium 8.6 8.5 - 10.4 mg/dL    Phosphorus 2.8 2.5 - 4.5 mg/dL    Albumin 2.5 (L) 3.5 - 5.0 g/dL    Anion Gap 16 <=19 mmol/L   CBC and Auto Differential   Result Value Ref Range    WBC 22.9 (H) 4.4 - 11.3 x10*3/uL    nRBC 0.0 0.0 - 0.0 /100 WBCs    RBC 3.91 (L) 4.00 - 5.20 x10*6/uL    Hemoglobin 11.5 (L) 12.0 - 16.0 g/dL    Hematocrit 37.8 36.0 - 46.0 %    MCV 97 80 - 100 fL    MCH 29.4 26.0 - 34.0 pg    MCHC 30.4 (L) 32.0 - 36.0 g/dL    RDW 24.7 (H) 11.5 - 14.5 %    Platelets 417 150 - 450 x10*3/uL    Neutrophils % 65.5 40.0 - 80.0 %    Immature Granulocytes %, Automated 0.9 0.0 - 0.9 %    Lymphocytes %  22.6 13.0 - 44.0 %    Monocytes % 8.1 2.0 - 10.0 %    Eosinophils % 2.4 0.0 - 6.0 %    Basophils % 0.5 0.0 - 2.0 %    Neutrophils Absolute 15.03 (H) 1.60 - 5.50 x10*3/uL    Immature Granulocytes Absolute, Automated 0.20 0.00 - 0.50 x10*3/uL    Lymphocytes Absolute 5.17 (H) 0.80 - 3.00 x10*3/uL    Monocytes Absolute 1.86 (H) 0.05 - 0.80 x10*3/uL    Eosinophils Absolute 0.54 (H) 0.00 - 0.40 x10*3/uL    Basophils Absolute 0.12 (H) 0.00 - 0.10 x10*3/uL   VERIFY ABO/Rh Group Test   Result Value Ref Range    ABO TYPE O     Rh TYPE POS    Morphology   Result Value Ref Range    RBC Morphology See Below     Polychromasia Mild     Target Cells Few     Rock Cells Many     Clumped Platelets Present    POCT GLUCOSE   Result Value Ref Range    POCT Glucose 163 (H) 74 - 99 mg/dL   Blood Gas Lactic Acid, Venous   Result Value Ref Range    POCT Lactate, Venous 1.8 0.4 - 2.0 mmol/L   POCT GLUCOSE   Result Value Ref Range    POCT Glucose 141 (H) 74 - 99 mg/dL         Assessment/Plan       73-year-old female with end-stage renal disease  Hypotension    -Reviewed electrolytes and volume status, there is no urgency for dialysis  -Zabrina with ICU, she does have chronic hypotension but is on significant pressors right now.  She is relatively asymptomatic but will hold off dialysis for today.  -Will reassess with labs tomorrow the need/feasibility of doing dialysis.  Would anticipate next attempt will be on Monday, may need [place] at that point   -Thank you Dr. Barreto for allowing me to help in Ms. Gross's care, will follow      Ernesto Andrade MD

## 2024-01-20 NOTE — H&P
[facility] Critical Care Medicine       Date:  1/20/2024  Patient:  Ayanna Gross  YOB: 1950  MRN:  [id number]   Admit Date:  1/19/2024  ========================================================================================================    Chief Complaint   Patient presents with    Shortness of Breath     Patient with SOB and fever. Is very lethargic. Dialysis MWF but did not go today dt SOB and fever         History of Present Illness:  Ayanna Gross is a 73 y.o. female presenting with anemia, fever worsening sacral and lower extremity wounds and concerns from the nursing facility for left pneumonia. Patient presents to the emergency department found to be febrile she was hypotensive but responded to 500 cc fluid bolus she was found to have a hemoglobin of 6 she was begun and ordered for a single unit of blood transfusion will actually provide 2 units of packed cells additional IV fluids to complete her volume resuscitation given her fever clinical evidence of hypovolemia and infection with an elevated lactate of 2.3 she received a total of approximately 1800 mL of fluid she is DNR/DNI by advanced directives but ICU level care is acceptable her granddaughter questions disorder but it was confirmed and signed by her  who is her POA.  She will be admitted to the intensive care unit given her frail compromised state and high risk of morbidity and mortality necessitating IV antibiotics fluids blood transfusion and ultimately will receive dialysis likely tomorrow wound care consultation to general surgery has been requested as well as consultation with the intensivist infectious disease and nephrology wounds are cultured C. difficile is requested she remains on oral vancomycin for suppression given recent C. difficile infection in December she is initiated on pharmacy dosing of vancomycin and Zosyn 2.25 g IV every 6 hours. She continues on Eliquis at this time for DVT prophylaxis as well as  atrial fibrillation.     Further Hx: Ayanna Gross 73-year-old female with past medical history of atrial fibrillation, end-stage renal disease, C. difficile infection, moderate sized recurrent left pleural effusions, and MRSA bacteremia with mitral valve vegetation admitted to ICU for management of septic shock.  On morning exam patient was on 0.22 of levo just to maintain a MAP of 60 and SBP of 88.  Last documented echocardiogram from [facility] shows EF between 55 to 60% with grade 2 left ventricular diastolic dysfunction, and moderate circumferential pericardial effusion.  Unable to find adequate documentation of who treated her endocarditis, but there was documentation that she was currently still receiving treatment when she was admitted to [facility]. On further chart review Dr. Coulter is her infectious disease doctor. She also had 2 thoracentesis's for moderate to large sided left pleural effusions with cytology showing no malignancy, but unable to differentiate between exudative and transudative at this time.      Interval ICU Events:  1/20: Pt missed last dialysis appointment, no signs of urgent need today, but nephrology Dr. Cabello is following. Pt on high requirements of levophed. She is on broad spectrum abx for coverage currently ID Dr. Coulter following. Pt switches from being A&O x 3 and lethargy. Goal today is to get more central access, david, and thoracentesis.     Medical History:  Past Medical History:   Diagnosis Date    Asthma     CAD (coronary artery disease)     CHF (congestive heart failure) (CMS/[facility])     Chronic kidney disease on chronic dialysis (CMS/[facility])     Hypertension     Personal history of diseases of the blood and blood-forming organs and certain disorders involving the immune mechanism     History of autoimmune disorder    Sleep apnea     Type 2 diabetes mellitus without complications (CMS/[facility]) 09/15/2022    Diabetes mellitus     Past Surgical History:    Procedure Laterality Date    CARPAL TUNNEL RELEASE  11/18/2013    Neuroplasty Decompression Median Nerve At Carpal Tunnel    HAND SURGERY  11/18/2013    Hand Surgery                                                                                                                                                          MASTECTOMY, PARTIAL  04/10/2014    Right Breast Partial Mastectomy    MR HEAD ANGIO WO IV CONTRAST  8/29/2023    MR HEAD ANGIO WO IV CONTRAST [facility]    OTHER SURGICAL HISTORY  11/18/2013    Breast Reconstruction With Implant Prosthesis    OTHER SURGICAL HISTORY  11/18/2013    Thyroid Surgery Substernal Thyroidectomy Partial    OTHER SURGICAL HISTORY  11/18/2013    Modified Radical Mastectomy Left Breast    OTHER SURGICAL HISTORY  04/04/2022    Carpal tunnel surgery    OTHER SURGICAL HISTORY  04/04/2022    Foot surgery    OTHER SURGICAL HISTORY  04/04/2022    Hernia repair    SENTINEL LYMPH NODE BIOPSY  04/10/2014    [place] Lymph Node Biopsy    TONSILLECTOMY  11/18/2013    Tonsillectomy    UMBILICAL HERNIA REPAIR  11/18/2013    Umbilical Hernia Repair    US GUIDED PERCUTANEOUS PLACEMENT  6/29/2023    US GUIDED PERCUTANEOUS PLACEMENT [facility]     Medications Prior to Admission   Medication Sig Dispense Refill Last Dose    apixaban (Eliquis) 2.5 mg tablet Take 1 tablet (2.5 mg) by mouth 2 times a day.       blood sugar diagnostic strip 1 x daily e11.65 for 90 days       blood-glucose sensor (Dexcom G6 Sensor) device Check blood sugar when needed and as instructed 6 each 3     doxycycline (Vibramycin) 100 mg capsule Take 1 capsule (100 mg) by mouth once daily. Take with at least 8 ounces (large glass) of water, do not lie down for 30 minutes after Do not start before January 6, 2024.       escitalopram (Lexapro) 10 mg tablet 1 tablet Orally Once a day for 30 day(s)       febuxostat (Uloric) 40 mg tablet TAKE ONE TABLET BY MOUTH EVERY OTHER DAY       fenofibrate (Triglide) 160  mg tablet Take 1 tablet (160 mg) by mouth once daily.       gabapentin (Neurontin) 100 mg capsule Take 1 capsule (100 mg) by mouth 2 times a day.       insulin lispro (HumaLOG) 100 unit/mL injection Inject 0-0.1 mL (0-10 Units) under the skin 3 times a day with meals. Take as directed per insulin instructions.       midodrine (Proamatine) 5 mg tablet Take 3 tablets (15 mg) by mouth 3 times a day with meals.       nystatin (Mycostatin) 100,000 unit/gram powder APPLY 1 GRAM TO SKIN TWO TIMES A DAY 30 g 0     oxyCODONE-acetaminophen (Percocet) 5-325 mg tablet Take 1 tablet by mouth every 12 hours if needed for severe pain (7 - 10). 120 tablet 0     simvastatin (Zocor) 20 mg tablet TAKE 1 TABLET BY MOUTH DAILY 60 tablet 5      Patient has no known allergies.  Social History     Tobacco Use    Smoking status: Never    Smokeless tobacco: Never   Substance Use Topics    Alcohol use: Not Currently    Drug use: Never     Family History   Problem Relation Name Age of Onset    Lymphoma Mother      Prostate cancer Father          passed away [address] Medications:    norepinephrine, 0.01-3 mcg/kg/min, Last Rate: 0.22 mcg/kg/min (01/20/24 0848)          Current Facility-Administered Medications:     [Held by provider] apixaban (Eliquis) tablet 2.5 mg, 2.5 mg, oral, BID, Judd Tavera DO, 2.5 mg at 01/19/24 2127    dextrose 10 % in water (D10W) infusion, 0.3 g/kg/hr, intravenous, Once PRN, Judd Tavera DO    dextrose 50 % injection 25 g, 25 g, intravenous, q15 min PRN, Judd Tavera DO    escitalopram (Lexapro) tablet 10 mg, 10 mg, oral, Nightly, Judd Tavera DO, 10 mg at 01/19/24 2127    febuxostat (Uloric) tablet 40 mg, 40 mg, oral, Every other day, Judd Tavera DO    fenofibrate (Triglide) tablet 160 mg, 160 mg, oral, Daily, Judd Tavera DO, 160 mg at 01/19/24 2128    gabapentin (Neurontin) capsule 100 mg, 100 mg, oral, BID, Judd Tavera DO,  "100 mg at 01/19/24 2127    glucagon (Glucagen) injection 1 mg, 1 mg, intramuscular, q15 min PRN, Judd Tavera DO    insulin lispro (HumaLOG) injection 0-10 Units, 0-10 Units, subcutaneous, TID with meals, Judd Tavera DO, 2 Units at 01/20/24 0919    midodrine (Proamatine) tablet 15 mg, 15 mg, oral, TID with meals, Judd Tavera DO, 15 mg at 01/20/24 0742    norepinephrine (Levophed) 8 mg in dextrose 5% 250 mL (0.032 mg/mL) infusion (premix), 0.01-3 mcg/kg/min, intravenous, Continuous, Daniel Rubio DO, Last Rate: 30.6 mL/hr at 01/20/24 0848, 0.22 mcg/kg/min at 01/20/24 0848    nystatin (Mycostatin) 100,000 unit/gram powder, , Topical, BID, Judd Tavera DO, Given at 01/20/24 0949    oxyCODONE-acetaminophen (Percocet) 5-325 mg per tablet 1 tablet, 1 tablet, oral, q8h PRN, Judd Tavera DO, 1 tablet at 01/19/24 2130    oxygen (O2) therapy, , inhalation, Continuous PRN - O2/gases, Judd Tavera DO    piperacillin-tazobactam-dextrose (Zosyn) IV 2.25 g, 2.25 g, intravenous, q8h, Judd Tavera DO, Stopped at 01/20/24 0622    simvastatin (Zocor) tablet 20 mg, 20 mg, oral, Nightly, Judd Tavera DO, 20 mg at 01/19/24 2127    vancomycin (Vancocin) capsule 125 mg, 125 mg, oral, 4x daily, Judd Tavera DO, 125 mg at 01/20/24 0644    vancomycin (Vancocin) placeholder, , miscellaneous, Daily PRN, Kendall Alfonso, PharmD    Review of Systems:  14 point review of systems was completed and negative except for those specially mention in my HPI    Physical Exam:    Heart Rate:  []   Temp:  [36.1 °C (97 °F)-38 °C (100.4 °F)]   Resp:  [5-26]   BP: ()/(21-77)   Height:  [157.5 cm (5' 2\")]   Weight:  [74.3 kg (163 lb 12.8 oz)]   SpO2:  [88 %-99 %]     Physical Exam  Vitals and nursing note reviewed. Exam conducted with a chaperone present.   Constitutional:       General: She is in acute distress.      Appearance: She is obese. She is " ill-appearing.      Interventions: Nasal cannula in place.      Comments: Goes in and out of alert and oriented to lethargy    HENT:      Head: Normocephalic and atraumatic.      Nose: No congestion.      Mouth/Throat:      Mouth: Mucous membranes are dry.      Pharynx: Oropharynx is clear. No oropharyngeal exudate.   Eyes:      General: No scleral icterus.     Extraocular Movements: Extraocular movements intact.      Conjunctiva/sclera: Conjunctivae normal.      Pupils: Pupils are equal, round, and reactive to light.   Cardiovascular:      Rate and Rhythm: Regular rhythm.      Pulses:           Carotid pulses are 2+ on the right side and 2+ on the left side.       Dorsalis pedis pulses are 1+ on the right side and 1+ on the left side.      Heart sounds: Normal heart sounds. No murmur heard.  Pulmonary:      Effort: Pulmonary effort is normal.      Breath sounds: Examination of the left-middle field reveals rales. Examination of the left-lower field reveals rales. Wheezing and rales present.   Abdominal:      General: Abdomen is protuberant. Bowel sounds are normal.      Palpations: Abdomen is soft.      Tenderness: There is no abdominal tenderness. There is no guarding or rebound.   Genitourinary:     Comments: Amos in place draining brown discharge  Musculoskeletal:         General: Normal range of motion.      Cervical back: Normal range of motion and neck supple. No tenderness.      Right lower leg: Edema present.      Left lower leg: Edema present.   Lymphadenopathy:      Cervical: No cervical adenopathy.   Skin:     General: Skin is dry.      Capillary Refill: Capillary refill takes 2 to 3 seconds.      Findings: Bruising, erythema and rash present.      Comments: Multiple ulcers and wounds on legs over night   Unstaged sacral wound      Neurological:      Mental Status: She is alert. She is disoriented.      Cranial Nerves: No cranial nerve deficit.      Motor: No weakness.         Objective:    I have  reviewed all medications, laboratory results, and imaging pertinent for today's encounter.    Intake/Output Summary (Last 24 hours) at 1/20/2024 1236  Last data filed at 1/20/2024 0848  Gross per 24 hour   Intake 705.41 ml   Output 25 ml   Net 680.41 ml         Assessment/Plan:    I am currently managing this critically ill patient for the following problems:  Septic Shock   ESRD   Chronic Hypotension   Recurrent Pleural Effusions   Vasculopathy   Multiple Wounds with and with out infections  C. Diff infection    Plan:  Neuro:  -Pain Control: Tylenol for mild, Percocet for moderate   -CAM Assessment, Neuro checks every shift   -Palliative consulted   -Continue home Lexapro and gabapentin    CV:   Hx of endocarditis   Septic Shock  Chronic Hypotension  Vasculopathy   -Levophed started, currently 0.22, maintain SBP > 90  -Continue home midodrine 15 mg TID   -Increasing Vascular access today [facility]/[place]   -Elevated troponins 2/2 shock state   -Continuous cardiac monitoring  -maintain MAPS >65    Resp:  Ct chest showed multiple nodules on lung   Moderate to large left sided pleural effusion   -Holding Eliquis for potential thoracentesis today   -SP02 >92%, Nasal canula titration   -Repeat CT C/A/P today   -continuous pulse ox  -Pulm hygiene     GI:   -NPO   -Flexacil for stools   -BR with senna/colace PRN   -Protonix daily     :  ESRD   -Nephrology consulted for dialysis order, High dose of pressors today no urgent need for dialysis can hold off and reevaluate   -Elevated Lactate 2/2 hypoperfusion   -Maintain de la cruz catheter  -Avoid Large amounts of fluids   -Replete electrolytes as indicated for ESRD    -Continue Uloric for gout prophylaxis     Endo:  DM2   -SSI Q4h while NPO    Heme:   Anemia of chronic disease   -monitor for s/s anemia  -transfuse for hgb <7  -daily CBC    MSK:   Multiple Wounds with and with out infections  -Surgery consulted for sacral wound   -Wound care consulted for dressing other wounds    -padded pressure points  -ICU skin protocol    ID:  Hx of MRSA bacteremia with endocarditis  Multiple Wounds with and with out infections  C. Diff infection  -WBC 22.9 on admission was started on Vanc and zosyn and cefepime, Will discontinue cefepime   -Per ID add doxycycline 100 IV every 24 hours   -Blood cultures pending   -Urine cultures pending   -Pro-maría pending   -ID Consulted   -C. Diff PCR pending, oral vancomycin later     Ethics/Code Status:  Status was changed to Full code by daughter Meka Bills who has supplied documentation that she is medical power of      :  DVT Prophylaxis: Heparin   GI Prophylaxis: Protonix  Bowel Regimen: None C. Diff infection  Diet: NPO  CVC: 1/20  [place]: 1/20  Amos: yes 1/20  Restraints: None  Dispo: ICU     This note was prepared using voice recognition software. The details of this note are correct and have been reviewed, and corrected to the best of my ability. Some grammatical areas may persist related to the Dragon software.     I have reviewed all medications, laboratory results, and imaging pertinent for today's encounter.  Plan Discussed with Dr. Barreto  Critical Care Time: 55 minutes  Critical Care Chana De La Garza PA-C

## 2024-01-20 NOTE — CONSULTS
Inpatient consult to Infectious Diseases  Consult performed by: Stephen Coulter MD  Consult ordered by: Judd Tavera DO          Primary MD: Deisy Conley MD    Reason For Consult  Septic shock    History Of Present Illness  Ayanna Gross is a 73 y.o. female presenting with fever and low blood pressure.  Recently admitted to the hospital for septic shock and was treated for C. difficile infection and oral vancomycin.  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  She was seen with her daughter present.  She had a chest x-ray which was remarkable for left pleural effusion with concern for possible pneumonia.  She denies any significant cough.  She denies any chest pain.  She is on pressors.  She has a history of chronic hypotension, on midodrine.  She has a history of stage III sacral ulcer, and bilateral heel eschar from previous admission.  Past Medical History  She has a past medical history of Asthma, CAD (coronary artery disease), CHF (congestive heart failure) (CMS/[facility]), Chronic kidney disease on chronic dialysis (CMS/Formerly [facility]), Hypertension, Personal history of diseases of the blood and blood-forming organs and certain disorders involving the immune mechanism, Sleep apnea, and Type 2 diabetes mellitus without complications (CMS/Formerly [facility]) (09/15/2022).    Surgical History  She has a past surgical history that includes Other surgical history (11/18/2013); Carpal tunnel release (11/18/2013); Umbilical hernia repair (11/18/2013); Tonsillectomy (11/18/2013); Hand surgery (11/18/2013); Other surgical history (11/18/2013); Other surgical history (11/18/2013); Other surgical history (04/04/2022); Other surgical history (04/04/2022); Other surgical history (04/04/2022); Mastectomy, partial (04/10/2014); [place] lymph node biopsy (04/10/2014); US guided percutaneous placement (6/29/2023); and MR angio head wo IV contrast (8/29/2023).      Social History     Occupational History    Not on file   Tobacco Use    Smoking status: Never    Smokeless tobacco: Never   Substance and Sexual Activity    Alcohol use: Not Currently    Drug use: Never    Sexual activity: Not on file     Travel History   Travel since 12/20/23    No documented travel since 12/20/23           Family History  Family History   Problem Relation Name Age of Onset    Lymphoma Mother      Prostate cancer Father          passed away fabiana min 2017     Allergies  Patient has no known allergies.     Immunization History   Administered Date(s) Administered    Flu vaccine (IIV4), preservative free *Check age/dose* 12/01/2016    Flu vaccine, quadrivalent, high-dose, preservative free, age 65y+ (FLUZONE) 10/07/2023    Flu vaccine, quadrivalent, recombinant, preservative free, adult (FLUBLOK) 10/25/2023    Hepatitis B vaccine, adult (HEPLISAV) 06/27/2022, 08/10/2022, 09/14/2022, 11/09/2022    Hepatitis B vaccine, adult (RECOMBIVAX, ENGERIX) 07/22/2019, 08/19/2019, 09/18/2019, 01/15/2020, 04/08/2020, 05/15/2020, 06/10/2020, 09/28/2020    Influenza, High Dose Seasonal, Preservative Free 09/08/2017, 11/26/2018, 03/12/2020    Influenza, Unspecified 10/14/2012, 12/05/2014    Influenza, seasonal, injectable 10/11/2013, 12/01/2016    Moderna SARS-CoV-2 Vaccination 03/10/2021, 04/09/2021, 11/05/2021    Novel influenza-H1N1-09, preservative-free 12/07/2009    Pneumococcal polysaccharide vaccine, 23-valent, age 2 years and older (PNEUMOVAX 23) 10/11/2013, 08/28/2019    Tdap vaccine, age 7 year and older (BOOSTRIX) 12/30/2015    Zoster vaccine, recombinant, adult (SHINGRIX) 07/20/2021     Medications  Home medications:  Medications Prior to Admission   Medication Sig Dispense Refill Last Dose    apixaban (Eliquis) 2.5 mg tablet Take 1 tablet (2.5 mg) by mouth 2 times a day.       blood sugar diagnostic strip 1 x daily e11.65 for 90 days       blood-glucose sensor (Dexcom G6 Sensor) device Check blood  sugar when needed and as instructed 6 each 3     doxycycline (Vibramycin) 100 mg capsule Take 1 capsule (100 mg) by mouth once daily. Take with at least 8 ounces (large glass) of water, do not lie down for 30 minutes after Do not start before January 6, 2024.       escitalopram (Lexapro) 10 mg tablet 1 tablet Orally Once a day for 30 day(s)       febuxostat (Uloric) 40 mg tablet TAKE ONE TABLET BY MOUTH EVERY OTHER DAY       fenofibrate (Triglide) 160 mg tablet Take 1 tablet (160 mg) by mouth once daily.       gabapentin (Neurontin) 100 mg capsule Take 1 capsule (100 mg) by mouth 2 times a day.       insulin lispro (HumaLOG) 100 unit/mL injection Inject 0-0.1 mL (0-10 Units) under the skin 3 times a day with meals. Take as directed per insulin instructions.       midodrine (Proamatine) 5 mg tablet Take 3 tablets (15 mg) by mouth 3 times a day with meals.       nystatin (Mycostatin) 100,000 unit/gram powder APPLY 1 GRAM TO SKIN TWO TIMES A DAY 30 g 0     oxyCODONE-acetaminophen (Percocet) 5-325 mg tablet Take 1 tablet by mouth every 12 hours if needed for severe pain (7 - 10). 120 tablet 0     simvastatin (Zocor) 20 mg tablet TAKE 1 TABLET BY MOUTH DAILY 60 tablet 5      Current medications:  Scheduled medications  [Held by provider] apixaban, 2.5 mg, oral, BID  escitalopram, 10 mg, oral, Nightly  febuxostat, 40 mg, oral, Every other day  fenofibrate, 160 mg, oral, Daily  gabapentin, 100 mg, oral, BID  insulin lispro, 0-10 Units, subcutaneous, TID with meals  midodrine, 15 mg, oral, TID with meals  nystatin, , Topical, BID  piperacillin-tazobactam, 2.25 g, intravenous, q8h  simvastatin, 20 mg, oral, Nightly  vancomycin, 125 mg, oral, 4x daily      Continuous medications  norepinephrine, 0.01-3 mcg/kg/min, Last Rate: 0.22 mcg/kg/min (01/20/24 0848)      PRN medications  PRN medications: dextrose 10 % in water (D10W), dextrose, glucagon, oxyCODONE-acetaminophen, oxygen, vancomycin    Review of Systems   Constitutional:  " Positive for fever.   Skin:  Positive for wound.   All other systems reviewed and are negative.       Objective  Range of Vitals (last 24 hours)  Heart Rate:  []   Temp:  [36.1 °C (97 °F)-38 °C (100.4 °F)]   Resp:  [5-26]   BP: ()/(21-77)   Height:  [157.5 cm (5' 2\")]   Weight:  [74.3 kg (163 lb 12.8 oz)]   SpO2:  [88 %-99 %]   Daily Weight  01/19/24 : 74.3 kg (163 lb 12.8 oz)    Body mass index is 29.96 kg/m².     Physical Exam  Constitutional:       Appearance: Normal appearance.   HENT:      Head: Normocephalic and atraumatic.      Nose: Nose normal.   Eyes:      Extraocular Movements: Extraocular movements intact.      Conjunctiva/sclera: Conjunctivae normal.   Cardiovascular:      Heart sounds: Normal heart sounds, S1 normal and S2 normal.   Pulmonary:      Breath sounds: Decreased breath sounds present.   Abdominal:      General: Bowel sounds are normal.      Palpations: Abdomen is soft.   Musculoskeletal:      Cervical back: Normal range of motion and neck supple.   Skin:     Comments: Stage III sacral ulcer, bilateral posterior heel eschar   Neurological:      Mental Status: She is alert.          Relevant Results  Outside Hospital Results    Labs  Results from last 72 hours   Lab Units 01/20/24  0449   WBC AUTO x10*3/uL 22.9*   HEMOGLOBIN g/dL 11.5*   HEMATOCRIT % 37.8   PLATELETS AUTO x10*3/uL 417   NEUTROS PCT AUTO % 65.5   LYMPHS PCT AUTO % 22.6   MONOS PCT AUTO % 8.1   EOS PCT AUTO % 2.4     Results from last 72 hours   Lab Units 01/20/24  0449 01/19/24  1400   SODIUM mmol/L 135 131*   POTASSIUM mmol/L 4.5 4.2   CHLORIDE mmol/L 96* 93*   CO2 mmol/L 23* 28   BUN mg/dL 19 15   CREATININE mg/dL 3.40* 2.90*   GLUCOSE mg/dL 138* 130*   CALCIUM mg/dL 8.6 8.0*   ANION GAP mmol/L 16 10   EGFR mL/min/1.73m*2 14* 17*   PHOSPHORUS mg/dL 2.8  --      Results from last 72 hours   Lab Units 01/20/24  0449 01/19/24  1400   ALK PHOS U/L  --  107   BILIRUBIN TOTAL mg/dL  --  1.0   PROTEIN TOTAL g/dL  --  " "5.3*   ALT U/L  --  13   AST U/L  --  30   ALBUMIN g/dL 2.5* 2.4*     Estimated Creatinine Clearance: 14.3 mL/min (A) (by C-G formula based on SCr of 3.4 mg/dL (H)).  C-Reactive Protein   Date Value Ref Range Status   12/25/2023 5.20 (H) 0.00 - 2.00 mg/dL Final     CRP   Date Value Ref Range Status   06/23/2023 19.9 (H) 0 - 2.0 MG/DL Final     Comment:     Performed at [place] [id number]   05/22/2022 1.0 0 - 2.0 MG/DL Final     Comment:     Performed at [place] [id number]     Sedimentation Rate   Date Value Ref Range Status   06/23/2023 125 (H) 0 - 30 MM/HR Final     Comment:     Performed at [place] [id number]   05/22/2022 113 (H) 0 - 20 MM/HR Final     Comment:     Performed at [place] [id number]   12/13/2020 100 (H) 0 - 20 MM/HR Final     Comment:     Performed at [place] [id number]     No results found for: \"HIV1X2\", \"HIVCONF\", \"BZGJZO9FA\"  No results found for: \"HEPCABINIT\", \"HEPCAB\", \"HCVPCRQUANT\"  Microbiology  Reviewed-pending  Imaging  XR chest 1 view    Result Date: 1/20/2024  Interpreted By:  Brendon Perez, STUDY: XR CHEST 1 VIEW; 1/20/2024 5:03 pm   INDICATION: CLINICAL INFORMATION: Signs/Symptoms:Insertion right IJ central line, also left thoracentesis.   COMPARISON: 01/19/2024 at 1338 hours   ACCESSION NUMBER(S): [id number]   ORDERING CLINICIAN: BOZENA ANTONIO   TECHNIQUE: Portable chest one view.   FINDINGS: The cardiac size is indeterminate in view of the AP projection. There is no change in the right-sided dual port central venous catheter. There is a new right internal jugular venous catheter present with the tip at approximately same level as the dual port central venous catheter, overlying the mid to lower right atrium. There is no evidence for pneumothorax. Patient has a history of left thoracentesis without evidence for pneumothorax on the left. There is " bilateral basilar alveolar infiltrate and effusions. Left effusion is decreased compared to the study from 1 day earlier.   Surgical clips are identified within left chest wall. Endovascular stent is identified in the distribution of the left subclavian artery.       1. Status post right-sided central venous catheter placement as described above with the tip overlying the mid to caudal aspect of the right atrium. There is no evidence for pneumothorax. 2. No evidence for left-sided pneumothorax after left-sided thoracentesis. Decrease in the left effusion. 3. Bilateral basilar infiltrates and effusions are present. Follow-up to assure complete clearing is suggested.   MACRO: none   Signed by: Brendon Perez 1/20/2024 5:11 PM Dictation workstation:   [id number]    XR chest 1 view    Result Date: 1/19/2024  Interpreted By:  Real Mendieta, STUDY: XR CHEST 1 VIEW; 1/19/2024 1:38 pm   INDICATION: Signs/Symptoms:dyspnea.   COMPARISON: 12/26/2023   ACCESSION NUMBER(S): [id number]   ORDERING CLINICIAN: EMELY GOLDSMITH   TECHNIQUE: 1 view of the chest was performed.   FINDINGS: There may be a minimal right pleural effusion. Slight prominence of the interstitium otherwise the right lung is clear. Improved appearance of the left lung with improved aeration of the right upper lobe. There is opacification of the left lower lobe possibly due to large pleural effusion which is similar to the prior study. No pneumothorax. Right-sided dual lumen central line catheter with tip at the proximal atrium. The cardiomediastinal silhouette is within normal limits. Left-sided subclavian stents are again noted.       Improved aeration and appearance of the left lung superiorly however there is a persistent large left pleural effusion and opacification of the left lower lobe.   Signed by: Real Mendieta 1/19/2024 1:44 PM Dictation workstation:   [id number]    Electrocardiogram, 12-lead PRN ACS symptoms    Result Date: 1/7/2024  Sinus  rhythm Right bundle branch block Septal infarct (cited on or before 06-DEC-2023) Abnormal ECG When compared with ECG of 06-DEC-2023 13:06, Questionable change in initial forces of Septal leads Confirmed by Herminio Lemus ([id number]) on 1/7/2024 11:04:01 AM    XR chest 1 view    Result Date: 12/26/2023  Interpreted By:  Rikki Patel, STUDY: XR CHEST 1 VIEW; 12/26/2023 12:23 pm   INDICATION: Signs/Symptoms:hypoxia   COMPARISON: 12/19/2023   ACCESSION NUMBER(S): [id number]   ORDERING CLINICIAN: BRITTANI TOLEDO   FINDINGS: The study is limited due to rotation. Dialysis catheter is stable in position. The cardiac silhouette is indeterminate due to the technique. There is interval significant worsening of large left effusion and left lung atelectasis/infiltrates, with complete opacification of the left hemithorax. There is no pneumothorax. Vascular stents are noted in subclavian vessels. Surgical staples are again seen in the left axilla. The osseous structures are unchanged.       Limited study. Interval worsening of large left pleural effusion and left lung infiltrates/atelectasis, with complete opacification of the left hemithorax.   Signed by: Rikki Patel 12/26/2023 12:40 PM Dictation workstation:   [id number]     Assessment/Plan   Septic shock  Left-sided pleural effusion  History of MRSA endocarditis  History of C. 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

## 2024-01-20 NOTE — PROCEDURES
Arterial Line Insertion    Indication: Continuous blood pressure monitoring & numerous ABGs    Catheter: 20g, 5 cm    Procedure  The patient was prepped and draped in the normal sterile fashion.  The ultrasound probe was draped with a sterile probe cover. Using ultrasound guidance, the radial artery was identified and cannulated.  There was good pulsatile flow through the needle.  A wire was then introduced into the artery.  The needle was removed and using the seldinger technique, the catheter was advanced over the wire.  The catheter was then secured with sutures and connected to the monitor with a good arterial waveform noted.  A dressing was applied.      Complications: none        The patient tolerated the procedure well.  This note was prepared using voice recognition software. The details of this note are correct and have been reviewed, and corrected to the best of my ability. Some grammatical areas may persist related to the Dragon software.       Proctored by Dr. Barreto  [facility]   Yusuf De La Garza PA-C

## 2024-01-20 NOTE — PROGRESS NOTES
Ayanna Gross is a 73 y.o. female on day 1 of admission presenting with Anemia due to blood loss, acute.      Subjective   She complains of generalized pain  She is seen in the ICU, she is on high-dose Levophed       Objective     Last Recorded Vitals  BP 84/63   Pulse (!) 117   Temp 36.1 °C (97 °F) (Temporal)   Resp 26   Wt 74.3 kg (163 lb 12.8 oz)   SpO2 91%   Intake/Output last 3 Shifts:    Intake/Output Summary (Last 24 hours) at 1/20/2024 0902  Last data filed at 1/20/2024 0848  Gross per 24 hour   Intake 705.41 ml   Output 25 ml   Net 680.41 ml       Admission Weight  Weight: 74.3 kg (163 lb 12.8 oz) (01/19/24 1317)    Daily Weight  01/19/24 : 74.3 kg (163 lb 12.8 oz)    Image Results  XR chest 1 view  Narrative: Interpreted By:  Real Mendieta,   STUDY:  XR CHEST 1 VIEW; 1/19/2024 1:38 pm      INDICATION:  Signs/Symptoms:dyspnea.      COMPARISON:  12/26/2023      ACCESSION NUMBER(S):  [id number]      ORDERING CLINICIAN:  EMELY GOLDSMITH      TECHNIQUE:  1 view of the chest was performed.      FINDINGS:  There may be a minimal right pleural effusion. Slight prominence of  the interstitium otherwise the right lung is clear. Improved  appearance of the left lung with improved aeration of the right upper  lobe. There is opacification of the left lower lobe possibly due to  large pleural effusion which is similar to the prior study. No  pneumothorax. Right-sided dual lumen central line catheter with tip  at the proximal atrium. The cardiomediastinal silhouette is within  normal limits. Left-sided subclavian stents are again noted.      Impression: Improved aeration and appearance of the left lung superiorly however  there is a persistent large left pleural effusion and opacification  of the left lower lobe.      Signed by: Real Mendieta 1/19/2024 1:44 PM  Dictation workstation:   [id number]      Physical Exam  Vitals and nursing note reviewed.   Constitutional:       Appearance: She is ill-appearing  and toxic-appearing.   HENT:      Head: Atraumatic.      Mouth/Throat:      Mouth: Mucous membranes are dry.   Eyes:      Pupils: Pupils are equal, round, and reactive to light.   Cardiovascular:      Rate and Rhythm: Tachycardia present. Rhythm irregular.      Pulses: Normal pulses.      Heart sounds: Normal heart sounds.   Pulmonary:      Effort: Pulmonary effort is normal.      Breath sounds: Normal breath sounds.      Comments: Diminished breath sounds left chest lateral and posteriorly  Dialysis catheter right chest  Abdominal:      General: Bowel sounds are normal.      Palpations: Abdomen is soft.   Musculoskeletal:         General: Deformity present.      Cervical back: Normal range of motion and neck supple.   Skin:     General: Skin is warm and dry.      Capillary Refill: Capillary refill takes less than 2 seconds.      Findings: Rash present.      Comments: Unstageable sacral wound chronic lower extremity heel and ankle wounds with necrosis   Neurological:      General: No focal deficit present.      Mental Status: She is disoriented.      Comments: Nonambulatory   Psychiatric:         Mood and Affect: Mood normal.      Relevant Results  Results for orders placed or performed during the hospital encounter of 01/19/24 (from the past 24 hour(s))   Sars-CoV-2 and Influenza A/B PCR   Result Value Ref Range    Flu A Result Not Detected Not Detected    Flu B Result Not Detected Not Detected    Coronavirus 2019, PCR Not Detected Not Detected   CBC and Auto Differential   Result Value Ref Range    WBC      nRBC      RBC      Hemoglobin      Hematocrit      MCV      MCH      MCHC      RDW      Platelets      Neutrophils %      Immature Granulocytes %, Automated      Lymphocytes %      Monocytes %      Eosinophils %      Basophils %      Neutrophils Absolute      Immature Granulocytes Absolute, Automated      Lymphocytes Absolute      Monocytes Absolute      Eosinophils Absolute      Basophils Absolute     Serial  Troponin, 2 Hour ([facility])   Result Value Ref Range    Troponin T, High Sensitivity 345 (HH) <=14 ng/L   Blood Culture    Specimen: Peripheral Venipuncture; Blood culture   Result Value Ref Range    Blood Culture Loaded on Instrument - Culture in progress    Blood Culture    Specimen: Peripheral Venipuncture; Blood culture   Result Value Ref Range    Blood Culture Loaded on Instrument - Culture in progress    BLOOD GAS LACTIC ACID, VENOUS   Result Value Ref Range    POCT Lactate, Venous 2.3 (H) 0.4 - 2.0 mmol/L   Comprehensive metabolic panel   Result Value Ref Range    Glucose 130 (H) 65 - 99 mg/dL    Sodium 131 (L) 133 - 145 mmol/L    Potassium 4.2 3.4 - 5.1 mmol/L    Chloride 93 (L) 97 - 107 mmol/L    Bicarbonate 28 24 - 31 mmol/L    Urea Nitrogen 15 8 - 25 mg/dL    Creatinine 2.90 (H) 0.40 - 1.60 mg/dL    eGFR 17 (L) >60 mL/min/1.73m*2    Calcium 8.0 (L) 8.5 - 10.4 mg/dL    Albumin 2.4 (L) 3.5 - 5.0 g/dL    Alkaline Phosphatase 107 35 - 125 U/L    Total Protein 5.3 (L) 5.9 - 7.9 g/dL    AST 30 5 - 40 U/L    Bilirubin, Total 1.0 0.1 - 1.2 mg/dL    ALT 13 5 - 40 U/L    Anion Gap 10 <=19 mmol/L   Morphology   Result Value Ref Range    RBC Morphology      Polychromasia      Target Cells      Ovalocytes     Prepare RBC: 1 Units   Result Value Ref Range    PRODUCT CODE X2024Z50     Unit Number [id number]     Unit ABO O     Unit RH POS     XM INTEP COMP     Dispense Status XM     Blood Expiration Date February 16, 2024 23:59 EST     PRODUCT BLOOD TYPE 5100     UNIT VOLUME 400    Blood Gas Lactic Acid, Venous   Result Value Ref Range    POCT Lactate, Venous 1.6 0.4 - 2.0 mmol/L   Troponin T   Result Value Ref Range    Troponin T, High Sensitivity 331 (HH) <=14 ng/L   Type and screen   Result Value Ref Range    ABO TYPE O     Rh TYPE POS     ANTIBODY SCREEN NEG    Protime-INR   Result Value Ref Range    Protime 13.9 (H) 9.3 - 12.7 seconds    INR 1.3 (H) 0.9 - 1.2   Iron and TIBC   Result Value Ref Range    Iron 21 (L)  30 - 160 ug/dL    UIBC 76 (L) 110 - 370 ug/dL    TIBC 97 (L) 228 - 428 ug/dL    % Saturation 22 12 - 50 %   Magnesium   Result Value Ref Range    Magnesium 1.50 (L) 1.60 - 3.10 mg/dL   Prepare RBC: 1 Units   Result Value Ref Range    PRODUCT CODE P4869P35     Unit Number [id number]     Unit ABO O     Unit RH POS     XM INTEP COMP     Dispense Status XM     Blood Expiration Date February 15, 2024 23:59 EST     PRODUCT BLOOD TYPE 5100     UNIT VOLUME 350    Urinalysis with Reflex Culture and Microscopic   Result Value Ref Range    Color, Urine Dark-Orange (N) Light-Yellow, Yellow, Dark-Yellow    Appearance, Urine Ex.Turbid (N) Clear    Specific Gravity, Urine 1.027 1.005 - 1.035    pH, Urine 6.5 5.0, 5.5, 6.0, 6.5, 7.0, 7.5, 8.0    Protein, Urine 300 (3+) (A) NEGATIVE, 10 (TRACE), 20 (TRACE) mg/dL    Glucose, Urine Normal Normal mg/dL    Blood, Urine 1.0 (3+) (A) NEGATIVE    Ketones, Urine NEGATIVE NEGATIVE mg/dL    Bilirubin, Urine NEGATIVE NEGATIVE    Urobilinogen, Urine Normal Normal mg/dL    Nitrite, Urine NEGATIVE NEGATIVE    Leukocyte Esterase, Urine 500 Sharmin/µL (A) NEGATIVE   Extra Urine Gray Tube   Result Value Ref Range    Extra Tube Hold for add-ons.    Microscopic Only, Urine   Result Value Ref Range    WBC, Urine >50 (A) 1-5, NONE /HPF    WBC Clumps, Urine MANY Reference range not established. /HPF    RBC, Urine >20 (A) NONE, 1-2, 3-5 /HPF    Bacteria, Urine 4+ (A) NONE SEEN /HPF   BLOOD GAS LACTIC ACID, VENOUS   Result Value Ref Range    POCT Lactate, Venous 2.1 (H) 0.4 - 2.0 mmol/L   POCT GLUCOSE   Result Value Ref Range    POCT Glucose 145 (H) 74 - 99 mg/dL   POCT GLUCOSE   Result Value Ref Range    POCT Glucose 116 (H) 74 - 99 mg/dL   Renal Function Panel   Result Value Ref Range    Glucose 138 (H) 65 - 99 mg/dL    Sodium 135 133 - 145 mmol/L    Potassium 4.5 3.4 - 5.1 mmol/L    Chloride 96 (L) 97 - 107 mmol/L    Bicarbonate 23 (L) 24 - 31 mmol/L    Urea Nitrogen 19 8 - 25 mg/dL    Creatinine 3.40  (H) 0.40 - 1.60 mg/dL    eGFR 14 (L) >60 mL/min/1.73m*2    Calcium 8.6 8.5 - 10.4 mg/dL    Phosphorus 2.8 2.5 - 4.5 mg/dL    Albumin 2.5 (L) 3.5 - 5.0 g/dL    Anion Gap 16 <=19 mmol/L   CBC and Auto Differential   Result Value Ref Range    WBC 22.9 (H) 4.4 - 11.3 x10*3/uL    nRBC 0.0 0.0 - 0.0 /100 WBCs    RBC 3.91 (L) 4.00 - 5.20 x10*6/uL    Hemoglobin 11.5 (L) 12.0 - 16.0 g/dL    Hematocrit 37.8 36.0 - 46.0 %    MCV 97 80 - 100 fL    MCH 29.4 26.0 - 34.0 pg    MCHC 30.4 (L) 32.0 - 36.0 g/dL    RDW 24.7 (H) 11.5 - 14.5 %    Platelets 417 150 - 450 x10*3/uL    Neutrophils % 65.5 40.0 - 80.0 %    Immature Granulocytes %, Automated 0.9 0.0 - 0.9 %    Lymphocytes % 22.6 13.0 - 44.0 %    Monocytes % 8.1 2.0 - 10.0 %    Eosinophils % 2.4 0.0 - 6.0 %    Basophils % 0.5 0.0 - 2.0 %    Neutrophils Absolute 15.03 (H) 1.60 - 5.50 x10*3/uL    Immature Granulocytes Absolute, Automated 0.20 0.00 - 0.50 x10*3/uL    Lymphocytes Absolute 5.17 (H) 0.80 - 3.00 x10*3/uL    Monocytes Absolute 1.86 (H) 0.05 - 0.80 x10*3/uL    Eosinophils Absolute 0.54 (H) 0.00 - 0.40 x10*3/uL    Basophils Absolute 0.12 (H) 0.00 - 0.10 x10*3/uL   VERIFY ABO/Rh Group Test   Result Value Ref Range    ABO TYPE O     Rh TYPE POS    Morphology   Result Value Ref Range    RBC Morphology See Below     Polychromasia Mild     Target Cells Few     [place] Cells Many     Clumped Platelets Present    POCT GLUCOSE   Result Value Ref Range    POCT Glucose 163 (H) 74 - 99 mg/dL        Assessment/Plan   Severe sepsis with septic shock requiring pressor support  Sacral wound, lower extremity wounds  C. difficile colitis  End-stage renal disease  Anemia  Malnutrition    Plan:  Continue Zosyn, cefepime, IV vancomycin, p.o. vancomycin  Await wound and blood cultures  Levophed to keep MAP greater than 65  IV hydration, monitor stool output  Discussed with critical care PA  Wound care  DVT prophylaxis      Gerard Newton MD

## 2024-01-20 NOTE — PROGRESS NOTES
Vancomycin Dosing by Pharmacy- INITIAL (HEMODIALYSIS)    Ayanna Gross is a 73 y.o. year old female who Pharmacy has been consulted for vancomycin dosing for Vancomycin Indications: Skin & Soft Tissue. Based on the patient's indication and renal status this patient will be dosed based on a Pre-HD level of 20-25 mcg/mL.     Patient is currently on hemodialysis.    Initial Loading Dosing:will be given a loading dose of 1750 mg     Vancomycin maintenance dose: 750 mg after each dialysis session User Schedule (TBD) - was on MWF but did not get dialyzed today 1/19.    Visit Vitals  /62 (BP Location: Right arm, Patient Position: Lying)   Pulse (!) 103   Temp 38 °C (100.4 °F) (Temporal)   Resp 20           Lab Results   Component Value Date    CREATININE 2.90 (H) 01/19/2024    CREATININE 3.30 (H) 01/05/2024    CREATININE 3.00 (H) 01/02/2024    CREATININE 2.00 (H) 01/01/2024       No intake/output data recorded.    Assessment/Plan     Pre-HD level will be obtained on 1/24 at 0500. May be obtained sooner if clinically indicated.   Will continue to monitor renal function daily while on vancomycin and order serum creatinine at least every 48 hours if not already ordered.  Follow for continued vancomycin needs, clinical response, and signs/symptoms of toxicity.     Omar Mason, PharmD

## 2024-01-20 NOTE — PROCEDURES
Left THORACENTESIS    Pleural fluid removed:  1000 mL  Appearance:   Straw colored      Procedure Details  Pre-procedure diagnosis: Pleural effusion  Post-procedure diagnosis: Same  Indication(s):  Diagnostic and therapeutic  Performed by:  Me  Assistant(s):  Dr. Judd Barreto  EBL:   3 ml     Consent obtained: Yes, risks & benefits discussed with patient's POA.  Patient identified: Yes  Position:  Right lateral decubitus; head flexed and knees bent as tolerated.  Timeout performed: Yes  Sedation / analgesia: None  Sterility:   Hat & mask on myself and assistant(s).  Site prepped with 2% CHG and 70% IPA solution using applicator.  Sterile gloves and drape.  Landmarks identified: Yes  Ultrasound guided: Yes, with sterile probe cover.  Local anesthetic: 5 mL 1% lidocaine using supplied 22 or 25 Ga. hypodermic needle.    Pocket of fluid identified with ultrasound.  Small stab incision made with #11 scalpel after administration of local anesthetic.  6 Fr x 16 cm Safe-T-Centesis catheter loaded over Veress introducer needle.  Introducer needle advanced over the rib and into the pleural space with aspiration of fluid.  Catheter thread easily over needle into pleural space and needle removed.  Fluid samples collected in pre-labeled specimen vials, then fluid drained using supplied universal drainage set.  Catheter removed and dressing applied.  Post-procedure CXR ordered.    No complications.  Other details: None.      This note was prepared using voice recognition software. The details of this note are correct and have been reviewed, and corrected to the best of my ability. Some grammatical areas may persist related to the Dragon software.     Proctored by Dr. Barreto  [facility] De La Garza PA-C

## 2024-01-20 NOTE — NURSING NOTE
Dr. Barreto and Hiren De La Garza in to place arterial line, tlc, and complete thoracentesis. Consent obtained from patient's daughter.

## 2024-01-20 NOTE — SIGNIFICANT EVENT
Daughter who has medical POA paperwork at bedside with the patient who is currently capable decision maker, and daughter states that she wishes the patient be full code.  I discussed with the patient at bedside also who wishes to be full code.  Will honor their wishes, and I have changed her CODE STATUS order.  Will defer to my daytime colleagues for possible palliative medicine consult

## 2024-01-20 NOTE — CARE PLAN
The patient's goals for the shift include      The clinical goals for the shift include Improved oxygenation      Problem: Pain  Goal: My pain/discomfort is manageable  Outcome: Progressing     Problem: Safety  Goal: Patient will be injury free during hospitalization  Outcome: Progressing  Goal: I will remain free of falls  Outcome: Progressing     Problem: Daily Care  Goal: Daily care needs are met  Outcome: Progressing     Problem: Psychosocial Needs  Goal: Demonstrates ability to cope with hospitalization/illness  Outcome: Progressing  Goal: Collaborate with me, my family, and caregiver to identify my specific goals  Outcome: Progressing  Flowsheets (Taken 1/20/2024 3893)  Cultural Requests During Hospitalization: n/a  Spiritual Requests During Hospitalization: n/a     Problem: Discharge Barriers  Goal: My discharge needs are met  Outcome: Progressing     Problem: Fall/Injury  Goal: Not fall by end of shift  Outcome: Progressing  Goal: Be free from injury by end of the shift  Outcome: Progressing  Goal: Verbalize understanding of personal risk factors for fall in the hospital  Outcome: Progressing  Goal: Verbalize understanding of risk factor reduction measures to prevent injury from fall in the home  Outcome: Progressing  Goal: Use assistive devices by end of the shift  Outcome: Progressing  Goal: Pace activities to prevent fatigue by end of the shift  Outcome: Progressing     Problem: Skin  Goal: Decreased wound size/increased tissue granulation at next dressing change  Outcome: Progressing  Flowsheets (Taken 1/20/2024 1095)  Decreased wound size/increased tissue granulation at next dressing change:   Promote sleep for wound healing   Protective dressings over bony prominences   Utilize specialty bed per algorithm  Goal: Participates in plan/prevention/treatment measures  Outcome: Progressing  Flowsheets (Taken 1/20/2024 3295)  Participates in plan/prevention/treatment measures:   Discuss with provider PT/OT  consult   Elevate heels   Increase activity/out of bed for meals  Goal: Prevent/manage excess moisture  Outcome: Progressing  Flowsheets (Taken 1/20/2024 0425)  Prevent/manage excess moisture:   Cleanse incontinence/protect with barrier cream   Monitor for/manage infection if present   Follow provider orders for dressing changes   Use wicking fabric (obtain order)   Moisturize dry skin  Goal: Prevent/minimize sheer/friction injuries  Outcome: Progressing  Flowsheets (Taken 1/20/2024 0425)  Prevent/minimize sheer/friction injuries:   Complete micro-shifts as needed if patient unable. Adjust patient position to relieve pressure points, not a full turn   Increase activity/out of bed for meals   Use pull sheet   Utilize specialty bed per algorithm   Turn/reposition every 2 hours/use positioning/transfer devices   HOB 30 degrees or less  Goal: Promote/optimize nutrition  Outcome: Progressing  Flowsheets (Taken 1/20/2024 0425)  Promote/optimize nutrition:   Assist with feeding   Monitor/record intake including meals   Consume > 50% meals/supplements   Offer water/supplements/favorite foods   Reassess MST if dietician not consulted   Discuss with provider if NPO > 2 days  Goal: Promote skin healing  Outcome: Progressing  Flowsheets (Taken 1/20/2024 0425)  Promote skin healing:   Protective dressings over bony prominences   Assess skin/pad under line(s)/device(s)   Turn/reposition every 2 hours/use positioning/transfer devices   Ensure correct size (line/device) and apply per  instructions   Rotate device position/do not position patient on device     Problem: Pain  Goal: Takes deep breaths with improved pain control throughout the shift  Outcome: Progressing  Goal: Turns in bed with improved pain control throughout the shift  Outcome: Progressing  Goal: Walks with improved pain control throughout the shift  Outcome: Progressing  Goal: Performs ADL's with improved pain control throughout shift  Outcome:  Progressing  Goal: Participates in PT with improved pain control throughout the shift  Outcome: Progressing  Goal: Free from opioid side effects throughout the shift  Outcome: Progressing  Goal: Free from acute confusion related to pain meds throughout the shift  Outcome: Progressing     Problem: Respiratory  Goal: Clear secretions with interventions this shift  Outcome: Progressing  Goal: Minimize anxiety/maximize coping throughout shift  Outcome: Progressing  Goal: Minimal/no exertional discomfort or dyspnea this shift  Outcome: Progressing  Goal: No signs of respiratory distress (eg. Use of accessory muscles. Peds grunting)  Outcome: Progressing  Goal: Patent airway maintained this shift  Outcome: Progressing  Goal: Tolerate mechanical ventilation evidenced by VS/agitation level this shift  Outcome: Progressing  Goal: Tolerate pulmonary toileting this shift  Outcome: Progressing  Goal: Verbalize decreased shortness of breath this shift  Outcome: Progressing  Goal: Wean oxygen to maintain O2 saturation per order/standard this shift  Outcome: Progressing  Goal: Increase self care and/or family involvement in next 24 hours  Outcome: Progressing

## 2024-01-21 NOTE — NURSING NOTE
BSSR. Patient resting in bed with eyes closed. Chest rise and fall visualized. No distress noted. Bilateral wrist restraints in place. Levophed infusing see MAR. Chandni waveform appropriate. Call light within reach.    Problem: NICU 32-33 weeks: Week 2 of Life (Days of Life 7-14)  Goal: Activity/Safety  Infant will be provided appropriate activity to stimulate growth and development according to gestational age. Outcome: Progressing Towards Goal  Pt identification band verified. Pt allowed adequate rest periods between care to promote growth. Velcro name band x 2 in place. Maternal prenatal history on chart. Goal: Consults, if ordered  All consultations will be made in a timely manner and good communication between disciplines will be observed as evidenced by coordinated care of patent and family. Outcome: Progressing Towards Goal  No new consultations made at this time. Goal: Diagnostic Test/Procedures  Infant will maintain normal blood glucose levels, optimal metabolic function, electrolyte and renal function, and growth related to birth weight/length. Infant will have normal hematocrit/hemoglobin values and will be free of signs/symptoms hyperbilirubinemia. Outcome: Progressing Towards Goal  RN to obtain bili and in am 3/7 per Md orders. Hearing screen and Car seat test to be completed prior to discharge. No further diagnostic tests/ procedures ordered at this time. Goal: Nutrition/Diet  Infant will demonstrate tolerance of feedings as evidenced by minimal residual and/or regurgitation. Infant will have adequate nutrition as evidenced by good weight gain of at least 15-30 grams a day, adequate intake with good PO skills. Outcome: Progressing Towards Goal  Pt receiving MBM or DBM 24 yaakov 36 ml Q 3 hours. RN attempting po feedings as tolerated and the remainder of feedings being administered via Ng tube. Goal: Medications  Infant will receive right medication at the right time, right dose, and right route as ordered by physician. Outcome: Progressing Towards Goal  Pt receiving Caffeine 19 mg po Q am and Vaseline as needed to prevent diaper rash.  Pt also receiving Sucrose up to 2 ml po per procedure and/ or Q 8 hours administered as needed for comfort/ pain management. No further medications ordered at this time          Goal: Respiratory  Oxygen saturation within defined limits, target SpO2 92-97%. Infant will maintain effective airway clearance and will have effective gas exchange. Outcome: Progressing Towards Goal  O2 saturations within normal limits on room air. Goal: Treatments/Interventions/Procedures  Treatments, interventions, and procedures initiated in a timely manner to maintain a state of equilibrium during growth and development process as evidenced by standards of care. Infant will maintain a body temperature as evidenced by axillary temperature = or > 97.2 degrees F. Outcome: Progressing Towards Goal  Pt remains in crib- temperature > = 97.2 degrees and stable. All further treatments/ interventions to be completed as tolerated per protocol. Goal: *Tolerating enteral feeding  Pt will tolerate feedings, as evidenced by minimal regurgitation and/or residuals prior to discharge. Outcome: Progressing Towards Goal  Pt tolerating po/ Ng feedings with minimal regurgitation and/ or residuals obtained. Goal: *Oxygen saturation within defined limits  Oxygen saturation within defined limits, target SpO2 92-97%. Infant will maintain effective airway clearance and will have effective gas exchange. Outcome: Progressing Towards Goal  O2 saturations within normal limits on room air. Goal: *Demonstrates behavior appropriate to gestational age  Infant will not experience any developmental delays through environmental stressors being minimized, and enhancing parent-infant relationships by understanding infant's behavior and interacting developmentally appropriate. Outcome: Progressing Towards Goal  Pt demonstrates appropriate behavior according to gestational age.     Goal: *Absence of infection signs and symptoms  Infant will receive appropriate medications and will be free of infection as evidenced by negative blood cultures. Outcome: Progressing Towards Goal  Blood Culture results neg. No signs of infection noted/ reported. Goal: *Family participates in care and asks appropriate questions  Parents will call and visit as much as they are able and participate in pt care appropriately. Parents will ask questions relevant to pt care/ current condition. Outcome: Progressing Towards Goal  Parents visit at least one time per day and participate in pt care appropriately. Parents also ask questions relevant to pt care/ current condition. Goal: *Labs within defined limits  Infant will maintain normal blood glucose levels, optimal metabolic function, electrolyte and renal function, and growth related to birth weight/length. Infant will have normal hematocrit/hemoglobin values and will be free of signs/symptoms hyperbilirubinemia.     Drawing bili this am/not under lights at present

## 2024-01-21 NOTE — PROGRESS NOTES
Ayanna Gross is a 73 y.o. female on day 2 of admission presenting with Septic shock (CMS/HCC).      Subjective   Overnight, rising pressor requirements, currently on about 22 mcg/min of Levophed       Objective          Vitals 24HR  Heart Rate:  []   Temp:  [35.6 °C (96.1 °F)-36.3 °C (97.3 °F)]   Resp:  [11-24]   BP: (85-97)/(61-69)   Weight:  [73.1 kg (161 lb 2.5 oz)]   SpO2:  [78 %-97 %]         Intake/Output last 3 Shifts:    Intake/Output Summary (Last 24 hours) at 1/21/2024 1246  Last data filed at 1/21/2024 1153  Gross per 24 hour   Intake 1264.07 ml   Output 0 ml   Net 1264.07 ml       Physical Exam  Gen: NAD  HENT: atraumatic  Heart: RRR  Lungs: CTA  Abdomen: Soft  Ext; n 2+ edema of the upper extremities, minimal edema of the lower extremities      Relevant Results               Assessment/Plan   This patient currently has cardiac telemetry ordered; if you would like to modify or discontinue the telemetry order, click here to go to the orders activity to modify/discontinue the order.  73-year-old female with end-stage renal disease  Hypotension/shock     -No critical electrolytes or volume issues requiring dialysis today.  -She has chronic hypotension, typically tolerates dialysis on pressors but with Levophed greater than 20, will hold off today.  -Regarding reversible causes of her shock, blood cultures have no growth to date, her urine culture is positive for gram-negative bacilli though.  -Will reassess tomorrow for need and feasibility of dialysis.  Ernesto Andrade MD

## 2024-01-21 NOTE — CONSULTS
Reason For Consult  Sacral wound, leg wound, heel wounds    History Of Present Illness  Ayanna Gross is a 73 y.o. female presenting with septic shock, pneumonia, UTI, bilateral heel wounds, and malnutrition admitted to the ICU. General surgery consulted to multiple wounds.     Notable escoriation and pressure ulcer of the sacral area. Bilateral heel wounds and right anterior tibial surface wound        Past Medical History  She has a past medical history of Asthma, CAD (coronary artery disease), CHF (congestive heart failure) (CMS/[facility]), Chronic kidney disease on chronic dialysis (CMS/[facility]), Hypertension, Personal history of diseases of the blood and blood-forming organs and certain disorders involving the immune mechanism, Sleep apnea, and Type 2 diabetes mellitus without complications (CMS/[facility]) (09/15/2022).    No current facility-administered medications on file prior to encounter.     Current Outpatient Medications on File Prior to Encounter   Medication Sig Dispense Refill    apixaban (Eliquis) 2.5 mg tablet Take 1 tablet (2.5 mg) by mouth 2 times a day.      blood sugar diagnostic strip 1 x daily e11.65 for 90 days      blood-glucose sensor (Dexcom G6 Sensor) device Check blood sugar when needed and as instructed 6 each 3    doxycycline (Vibramycin) 100 mg capsule Take 1 capsule (100 mg) by mouth once daily. Take with at least 8 ounces (large glass) of water, do not lie down for 30 minutes after Do not start before January 6, 2024.      escitalopram (Lexapro) 10 mg tablet 1 tablet Orally Once a day for 30 day(s)      febuxostat (Uloric) 40 mg tablet TAKE ONE TABLET BY MOUTH EVERY OTHER DAY      fenofibrate (Triglide) 160 mg tablet Take 1 tablet (160 mg) by mouth once daily.      gabapentin (Neurontin) 100 mg capsule Take 1 capsule (100 mg) by mouth 2 times a day.      insulin lispro (HumaLOG) 100 unit/mL injection Inject 0-0.1 mL (0-10 Units) under the skin 3 times a day with meals. Take as directed per  insulin instructions.      midodrine (Proamatine) 5 mg tablet Take 3 tablets (15 mg) by mouth 3 times a day with meals.      nystatin (Mycostatin) 100,000 unit/gram powder APPLY 1 GRAM TO SKIN TWO TIMES A DAY 30 g 0    oxyCODONE-acetaminophen (Percocet) 5-325 mg tablet Take 1 tablet by mouth every 12 hours if needed for severe pain (7 - 10). 120 tablet 0    simvastatin (Zocor) 20 mg tablet TAKE 1 TABLET BY MOUTH DAILY 60 tablet 5    [DISCONTINUED] gabapentin (Neurontin) 100 mg capsule 1 capsule oral daily at bedtime             Surgical History  She has a past surgical history that includes Other surgical history (11/18/2013); Carpal tunnel release (11/18/2013); Umbilical hernia repair (11/18/2013); Tonsillectomy (11/18/2013); Hand surgery (11/18/2013); Other surgical history (11/18/2013); Other surgical history (11/18/2013); Other surgical history (04/04/2022); Other surgical history (04/04/2022); Other surgical history (04/04/2022); Mastectomy, partial (04/10/2014); [place] lymph node biopsy (04/10/2014); US guided percutaneous placement (6/29/2023); and MR angio head wo IV contrast (8/29/2023).     Social History  She reports that she has never smoked. She has never used smokeless tobacco. She reports that she does not currently use alcohol. She reports that she does not use drugs.    Family History  Family History   Problem Relation Name Age of Onset    Lymphoma Mother      Prostate cancer Father          passed away [place] 2017        Allergies  Patient has no known allergies.    Review of Systems   Unable to perform ROS: Mental status change        Physical Exam  Constitutional:       Appearance: She is ill-appearing.   HENT:      Head: Normocephalic.      Mouth/Throat:      Mouth: Mucous membranes are dry.   Eyes:      Extraocular Movements: Extraocular movements intact.      Pupils: Pupils are equal, round, and reactive to light.   Cardiovascular:      Rate and Rhythm: Normal rate.      Pulses:  Normal pulses.      Heart sounds: Normal heart sounds.   Pulmonary:      Breath sounds: Normal breath sounds.   Abdominal:      General: Abdomen is flat.      Palpations: Abdomen is soft.   Musculoskeletal:      Cervical back: Normal range of motion.      Comments: See skin exam   Skin:     Coloration: Skin is pale.      Comments: Multiple wounds:  1) Sacral- Stage 2 ulcer, 20 x 17 cm area, no necrotic tissue, some loss of epidermis  2) Right heel- overlying eschar- please see image in media tab  3) Left heel wound- overlying eschar- please see image in media tab  4) Right anterior tibial wound- overlying eschar, please see media tab   Neurological:      Mental Status: She is disoriented.          Last Recorded Vitals  BP 85/65   Pulse 87   Temp 35.6 °C (96.1 °F) (Temporal)   Resp 16   Wt 73.1 kg (161 lb 2.5 oz)   SpO2 97%     Relevant Results      Results for orders placed or performed during the hospital encounter of 01/19/24 (from the past 24 hour(s))   pH, Body Fluid   Result Value Ref Range    pH, Fluid 7.20 See Below   Lactate Dehydrogenase, Fluid   Result Value Ref Range    LD, Fluid 64 Not established. U/L   Glucose, Fluid   Result Value Ref Range    Glucose, Fluid 173 Not established mg/dL   Protein, Total Fluid   Result Value Ref Range    Protein, Total Fluid 1.3 Not established g/dL   Body Fluid Cell Count   Result Value Ref Range    Color, Fluid Straw Colorless, Straw, Yellow    Clarity, Fluid Clear Clear    WBC, Fluid 67 See Comment /uL    RBC, Fluid 1,000 0  /uL /uL   Body Fluid Differential   Result Value Ref Range    Neutrophils %, Manual, Fluid 12 <25 % %    Lymphocytes %, Manual, Fluid 36 <75 % %    Mono/Macrophages %, Manual, Fluid 52 <70 % %    Eosinophils %, Manual, Fluid 0 0 % %    Basophils %, Manual, Fluid 0 0 % %    Immature Granulocytes %, Manual, Fluid 0 0 % %    Blasts %, Manual, Fluid 0 0 % %    Unclassified Cells %, Manual, Fluid 0 0 % %    Plasma Cells %, Manual, Fluid 0 0 % %     Total Cells Counted, Fluid 100    Triglycerides, Fluid   Result Value Ref Range    Triglycerides, Fluid 33 No established mg/dL   Amylase, Fluid   Result Value Ref Range    Amylase, Fluid <10 Not established. U/L   Procalcitonin   Result Value Ref Range    Procalcitonin 2.12 (H) <=0.07 ng/mL   Blood Gas Venous Full Panel   Result Value Ref Range    POCT pH, Venous 7.30 (L) 7.33 - 7.43 pH    POCT pCO2, Venous 49 41 - 51 mm Hg    POCT pO2, Venous 149 (H) 35 - 45 mm Hg    POCT SO2, Venous 100 (H) 45 - 75 %    POCT Oxy Hemoglobin, Venous 97.3 (H) 45.0 - 75.0 %    POCT Hematocrit Calculated, Venous 35.0 (L) 36.0 - 46.0 %    POCT Sodium, Venous 129 (L) 136 - 145 mmol/L    POCT Potassium, Venous 3.7 3.5 - 5.3 mmol/L    POCT Chloride, Venous 96 (L) 98 - 107 mmol/L    POCT Ionized [place], Venous 1.15 1.10 - 1.33 mmol/L    POCT Glucose, Venous 187 (H) 74 - 99 mg/dL    POCT Lactate, Venous 2.0 0.4 - 2.0 mmol/L    POCT Base Excess, Venous -2.6 (L) -2.0 - 3.0 mmol/L    POCT HCO3 Calculated, Venous 24.1 22.0 - 26.0 mmol/L    POCT Hemoglobin, Venous 11.5 (L) 12.0 - 16.0 g/dL    POCT Anion Gap, Venous 13.0 10.0 - 25.0 mmol/L    Patient Temperature 37.0 degrees Celsius    FiO2 30 %   Lactate Dehydrogenase   Result Value Ref Range    LDH     Protein, Total   Result Value Ref Range    Total Protein 5.4 (L) 5.9 - 7.9 g/dL   POCT GLUCOSE   Result Value Ref Range    POCT Glucose 129 (H) 74 - 99 mg/dL   POCT GLUCOSE   Result Value Ref Range    POCT Glucose 192 (H) 74 - 99 mg/dL   CBC and Auto Differential   Result Value Ref Range    WBC 21.0 (H) 4.4 - 11.3 x10*3/uL    nRBC 0.0 0.0 - 0.0 /100 WBCs    RBC 3.93 (L) 4.00 - 5.20 x10*6/uL    Hemoglobin 11.6 (L) 12.0 - 16.0 g/dL    Hematocrit 36.5 36.0 - 46.0 %    MCV 93 80 - 100 fL    MCH 29.5 26.0 - 34.0 pg    MCHC 31.8 (L) 32.0 - 36.0 g/dL    RDW 24.2 (H) 11.5 - 14.5 %    Platelets 424 150 - 450 x10*3/uL    Neutrophils % 77.0 40.0 - 80.0 %    Immature Granulocytes %, Automated 0.9 0.0 - 0.9  %    Lymphocytes % 12.9 13.0 - 44.0 %    Monocytes % 7.8 2.0 - 10.0 %    Eosinophils % 0.9 0.0 - 6.0 %    Basophils % 0.5 0.0 - 2.0 %    Neutrophils Absolute 16.18 (H) 1.60 - 5.50 x10*3/uL    Immature Granulocytes Absolute, Automated 0.19 0.00 - 0.50 x10*3/uL    Lymphocytes Absolute 2.71 0.80 - 3.00 x10*3/uL    Monocytes Absolute 1.63 (H) 0.05 - 0.80 x10*3/uL    Eosinophils Absolute 0.19 0.00 - 0.40 x10*3/uL    Basophils Absolute 0.10 0.00 - 0.10 x10*3/uL   Magnesium   Result Value Ref Range    Magnesium 1.50 (L) 1.60 - 3.10 mg/dL   Comprehensive metabolic panel   Result Value Ref Range    Glucose 209 (H) 65 - 99 mg/dL    Sodium 133 133 - 145 mmol/L    Potassium 3.9 3.4 - 5.1 mmol/L    Chloride 97 97 - 107 mmol/L    Bicarbonate 23 (L) 24 - 31 mmol/L    Urea Nitrogen 24 8 - 25 mg/dL    Creatinine 3.90 (H) 0.40 - 1.60 mg/dL    eGFR 12 (L) >60 mL/min/1.73m*2    Calcium 8.7 8.5 - 10.4 mg/dL    Albumin 2.1 (L) 3.5 - 5.0 g/dL    Alkaline Phosphatase 113 35 - 125 U/L    Total Protein 5.5 (L) 5.9 - 7.9 g/dL    AST 19 5 - 40 U/L    Bilirubin, Total 1.0 0.1 - 1.2 mg/dL    ALT 11 5 - 40 U/L    Anion Gap 13 <=19 mmol/L   Phosphorus   Result Value Ref Range    Phosphorus 4.2 2.5 - 4.5 mg/dL   Morphology   Result Value Ref Range    RBC Morphology See Below     Target Cells Few     Ovalocytes Few     [place] Cells Few    POCT GLUCOSE   Result Value Ref Range    POCT Glucose 200 (H) 74 - 99 mg/dL   POCT GLUCOSE   Result Value Ref Range    POCT Glucose 156 (H) 74 - 99 mg/dL   POCT GLUCOSE   Result Value Ref Range    POCT Glucose 174 (H) 74 - 99 mg/dL   POCT GLUCOSE   Result Value Ref Range    POCT Glucose 170 (H) 74 - 99 mg/dL      Lab Results   Component Value Date    HGBA1C 8.8 (H) 06/25/2023      CT chest abdomen pelvis wo IV contrast    Result Date: 1/21/2024  Interpreted By:  Maria Garcia, STUDY: CT CHEST ABDOMEN PELVIS WO CONTRAST;  1/20/2024 11:42 pm   INDICATION: Mental status change. Elevated white blood cell count with  infectious workup.   COMPARISON: 08/20/2023   ACCESSION NUMBER(S): [id number]   ORDERING CLINICIAN: BAN GIFFORD   TECHNIQUE: CT of the chest, abdomen and pelvis was performed with no oral or intravenous contrast administered. Sagittal and coronal reformations were completed by the technologist at the acquisition scanner.   All CT examinations are performed with 1 or more of the following dose reduction techniques: Automated exposure control, adjustment of mA and/or kv according to patient's size, or use of iterative reconstruction techniques.   FINDINGS: Please note that the study is limited without intravenous contrast.   CHEST: Bilateral pleural effusions are present with right effusion moderately small in size and with the left effusion moderately small in size as well. There is shift of the heart and mediastinum to the left of midline due to atelectasis of the left upper lobe. There is consolidation throughout left lower lobe with air bronchograms noted. On the right, there is compressive atelectasis seen posteriorly in the right lower lobe.   There is mild reactive mediastinal adenopathy seen at this time with mediastinal lymph nodes increased in size since prior study. Several of these mediastinal nodes are at the upper limits of normal measuring 8 mm in short axis diameter.   No pericardial effusion is present. The cardiac size is normal with mitral annulus calcification.   There has been prior bilateral mastectomy with reconstruction of the left breast with implant placement. Surgical clips are visible within the left axilla. Stent grafts are visible within the left upper extremity and within the left innominate, subclavian as well as axillary veins.   A peripherally calcified 1.8 cm nodule arises from the lower pole of left thyroid lobe.   ABDOMEN:   LIVER: Unremarkable.   BILE DUCTS: No intrahepatic or extrahepatic biliary ductal dilatation is seen.   GALLBLADDER: Cholelithiasis is observed with some  calcification of the gallbladder wall demonstrated as well. No gallbladder wall thickening or pericholecystic fluid is evident.   PANCREAS: Unremarkable   SPLEEN: Unremarkable   ADRENAL GLANDS: Unremarkable   KIDNEYS AND URETERS: Kidneys are small in size with extensive renal artery calcification demonstrated.   PELVIS:   BLADDER: Amos catheter is present within the decompressed urinary bladder.   REPRODUCTIVE ORGANS: Calcification of the arcuate arteries within the uterus is seen. There is no uterine enlargement or adnexal mass.   BOWEL: A rectal balloon is seen. There is no abnormal distention of the intestinal tract.   VESSELS: There is atherosclerosis of the thoracoabdominal aorta and iliac arteries with calcification in the walls of the celiac, splenic, hepatic, and mesenteric arteries.   PERITONEUM/RETROPERITONEUM/LYMPH NODES: There is a minimal amount of ascites seen about the liver and spleen with mild presacral edema noted.   ABDOMINAL WALL: There is anasarca involving the soft tissues of the abdomen and pelvis. Postoperative change from ventral hernia repair is seen.   BONES: Bilateral sacroiliitis is seen. There is lumbar dextroscoliosis. Chronic rotator cuff tear is present bilaterally with osteoarthritis of both shoulders, right greater than left.       Chest 1.  Moderately small bilateral pleural effusions with left upper lobe atelectasis and compressive atelectasis seen posteriorly in right lower lobe. A left lower lobe pneumonia is identified. There is extensive airspace consolidation within left lower lobe with air bronchograms observed. Mild mediastinal reactive adenopathy is present as well.   Abdomen-Pelvis 1.  Cholelithiasis without evidence of cholecystitis. 2. Small amount of ascites with mild presacral edema and anasarca within the soft tissues of the abdominal wall. 3. Renal atrophy with extensive vascular calcifications.     MACRO: None   Signed by: Maria Garcia 1/21/2024 8:30 AM Dictation  workstation:   [id number]    CT head wo IV contrast    Result Date: 1/21/2024  Interpreted By:  Maria Garcia, STUDY: CT HEAD WO IV CONTRAST 1/20/2024 11:42 pm   INDICATION: Signs/Symptoms:mental status changes   COMPARISON: 12/11/2023   ACCESSION NUMBER(S): [id number]   ORDERING CLINICIAN: BAN GIFFORD   TECHNIQUE: Unenhanced axial images of the brain are completed.   All CT examinations are performed with 1 or more of the following dose reduction techniques: Automated exposure control, adjustment of mA and/or kv according to patient's size, or use of iterative reconstruction techniques.   FINDINGS: Helical unenhanced axial images of the brain demonstrate a mild to moderate degree of ventricular enlargement with proportionate widening of the sulci and sylvian fissures. There is no midline shift, mass effect, extra-axial fluid collection, or acute intracranial hemorrhage. There is diminished density seen in the periventricular white matter indicating chronic microvascular ischemic disease. There is calcified plaque seen within the distal vertebral and internal carotid arteries bilaterally. No calvarial abnormality is seen.       Atrophy and chronic microvascular ischemic disease without acute intracranial process.   Signed by: Maria Garcia 1/21/2024 8:09 AM Dictation workstation:   [id number]    XR chest 1 view    Result Date: 1/20/2024  Interpreted By:  Brendon Perez, STUDY: XR CHEST 1 VIEW; 1/20/2024 5:03 pm   INDICATION: CLINICAL INFORMATION: Signs/Symptoms:Insertion right IJ central line, also left thoracentesis.   COMPARISON: 01/19/2024 at 1338 hours   ACCESSION NUMBER(S): [id number]   ORDERING CLINICIAN: BOZENA ANTONIO   TECHNIQUE: Portable chest one view.   FINDINGS: The cardiac size is indeterminate in view of the AP projection. There is no change in the right-sided dual port central venous catheter. There is a new right internal jugular venous catheter present with the tip at approximately same level as  the dual port central venous catheter, overlying the mid to lower right atrium. There is no evidence for pneumothorax. Patient has a history of left thoracentesis without evidence for pneumothorax on the left. There is bilateral basilar alveolar infiltrate and effusions. Left effusion is decreased compared to the study from 1 day earlier.   Surgical clips are identified within left chest wall. Endovascular stent is identified in the distribution of the left subclavian artery.       1. Status post right-sided central venous catheter placement as described above with the tip overlying the mid to caudal aspect of the right atrium. There is no evidence for pneumothorax. 2. No evidence for left-sided pneumothorax after left-sided thoracentesis. Decrease in the left effusion. 3. Bilateral basilar infiltrates and effusions are present. Follow-up to assure complete clearing is suggested.   MACRO: none   Signed by: Brendon Perez 1/20/2024 5:11 PM Dictation workstation:   [id number]    XR chest 1 view    Result Date: 1/19/2024  Interpreted By:  Real Mendieta, STUDY: XR CHEST 1 VIEW; 1/19/2024 1:38 pm   INDICATION: Signs/Symptoms:dyspnea.   COMPARISON: 12/26/2023   ACCESSION NUMBER(S): [id number]   ORDERING CLINICIAN: EMELY GOLDSMITH   TECHNIQUE: 1 view of the chest was performed.   FINDINGS: There may be a minimal right pleural effusion. Slight prominence of the interstitium otherwise the right lung is clear. Improved appearance of the left lung with improved aeration of the right upper lobe. There is opacification of the left lower lobe possibly due to large pleural effusion which is similar to the prior study. No pneumothorax. Right-sided dual lumen central line catheter with tip at the proximal atrium. The cardiomediastinal silhouette is within normal limits. Left-sided subclavian stents are again noted.       Improved aeration and appearance of the left lung superiorly however there is a persistent large left pleural  effusion and opacification of the left lower lobe.   Signed by: Real Mendieta 1/19/2024 1:44 PM Dictation workstation:   [id number]    Electrocardiogram, 12-lead PRN ACS symptoms    Result Date: 1/7/2024  Sinus rhythm Right bundle branch block Septal infarct (cited on or before 06-DEC-2023) Abnormal ECG When compared with ECG of 06-DEC-2023 13:06, Questionable change in initial forces of Septal leads Confirmed by Herminio Lemus ([id number]) on 1/7/2024 11:04:01 AM    XR chest 1 view    Result Date: 12/26/2023  Interpreted By:  Rikki Patel, STUDY: XR CHEST 1 VIEW; 12/26/2023 12:23 pm   INDICATION: Signs/Symptoms:hypoxia   COMPARISON: 12/19/2023   ACCESSION NUMBER(S): [id number]   ORDERING CLINICIAN: BRITTANI TOLEDO   FINDINGS: The study is limited due to rotation. Dialysis catheter is stable in position. The cardiac silhouette is indeterminate due to the technique. There is interval significant worsening of large left effusion and left lung atelectasis/infiltrates, with complete opacification of the left hemithorax. There is no pneumothorax. Vascular stents are noted in subclavian vessels. Surgical staples are again seen in the left axilla. The osseous structures are unchanged.       Limited study. Interval worsening of large left pleural effusion and left lung infiltrates/atelectasis, with complete opacification of the left hemithorax.   Signed by: Rikki Patel 12/26/2023 12:40 PM Dictation workstation:   [id number]      Principal Problem:    Septic shock (CMS/HCC)  Active Problems:    End stage renal disease (CMS/HCC)    Anemia due to blood loss, acute     Assessment/Plan   Principal Problem:    Septic shock (CMS/HCC)  Active Problems:    End stage renal disease (CMS/HCC)    Anemia due to blood loss, acute    Wounds:  1) Sacral wound- cleanse, dry, apply mepilex, specialty bed. Currently with fecal diversion devise  2) Right anterior tibial wound- cleanse and dry  3) Bilateral heal wounds- float heals for  pressure off loading, also placed consult to podiatry for possible debridement of wounds    Patient will need nutrition. If unable to eat or prolonged convalesce expected consider PEG tube (General surgery able to provide)         Frances Braxton MD

## 2024-01-21 NOTE — PROGRESS NOTES
Ayanna Gross is a 73 y.o. female on day 2 of admission presenting with Septic shock (CMS/HCC).    Subjective   Interval History:  S/p left thoracentesis on 1/20/2024  S/p right radial arterial line  S/p right IJ CVC placement  Afebrile  Increased pressor requirement  Awake, responsive    Review of Systems   All other systems reviewed and are negative.      Objective   Range of Vitals (last 24 hours)  Heart Rate:  []   Temp:  [35.7 °C (96.3 °F)-36.3 °C (97.3 °F)]   Resp:  [11-24]   BP: ()/(47-83)   Weight:  [73.1 kg (161 lb 2.5 oz)]   SpO2:  [78 %-98 %]   Daily Weight  01/21/24 : 73.1 kg (161 lb 2.5 oz)    Body mass index is 29.48 kg/m².    Physical Exam  Constitutional:       Appearance: Normal appearance.   HENT:      Head: Normocephalic and atraumatic.      Nose: Nose normal.   Eyes:      Extraocular Movements: Extraocular movements intact.      Conjunctiva/sclera: Conjunctivae normal.   Cardiovascular:      Heart sounds: Normal heart sounds, S1 normal and S2 normal.   Pulmonary:      Breath sounds: Decreased breath sounds present.   Abdominal:      General: Bowel sounds are normal.      Palpations: Abdomen is soft.   Musculoskeletal:      Cervical back: Normal range of motion and neck supple.   Skin:     Comments: Stage III sacral ulcer, bilateral posterior heel eschar   Neurological:      Mental Status: She is alert.        Antibiotics  aspirin tablet 325 mg  acetaminophen (Tylenol) tablet 650 mg  cefepime (Maxipime) 1 g in dextrose 5 % 50 mL IV  sodium chloride 0.9 % bolus 2,229 mL  apixaban (Eliquis) tablet 2.5 mg  escitalopram (Lexapro) tablet 10 mg  febuxostat (Uloric) tablet 40 mg  fenofibrate (Triglide) tablet 160 mg  gabapentin (Neurontin) capsule 100 mg  midodrine (Proamatine) tablet 15 mg  nystatin (Mycostatin) 100,000 unit/gram powder  oxyCODONE-acetaminophen (Percocet) 5-325 mg per tablet 1 tablet  simvastatin (Zocor) tablet 20 mg  piperacillin-tazobactam-dextrose (Zosyn) IV 2.25  g  vancomycin (Vancocin) capsule 125 mg  oxygen (O2) therapy  dextrose 50 % injection 25 g  glucagon (Glucagen) injection 1 mg  dextrose 10 % in water (D10W) infusion  insulin lispro (HumaLOG) injection 0-10 Units  nystatin (Mycostatin) 100,000 unit/gram powder 1 Application  vancomycin-diluent combo no.1 (Xellia) IVPB 1,750 mg  piperacillin-tazobactam-dextrose (Zosyn) IV 2.25 g  sodium chloride 0.9 % bolus 500 mL  norepinephrine (Levophed) 8 mg in dextrose 5% 250 mL (0.032 mg/mL) infusion (premix)  vancomycin (Vancocin) placeholder  pantoprazole (ProtoNix) EC tablet 40 mg  pantoprazole (ProtoNix) injection 40 mg  sennosides-docusate sodium (Judy-Colace) 8.6-50 mg per tablet 1 tablet  doxycycline (Vibramycin) in dextrose 5 % in water (D5W) 100 mL  mg  LORazepam (Ativan) injection 2 mg  magnesium sulfate IV 2 g      Relevant Results  Labs  Results from last 72 hours   Lab Units 01/21/24  0436 01/20/24  0449   WBC AUTO x10*3/uL 21.0* 22.9*   HEMOGLOBIN g/dL 11.6* 11.5*   HEMATOCRIT % 36.5 37.8   PLATELETS AUTO x10*3/uL 424 417   NEUTROS PCT AUTO % 77.0 65.5   LYMPHS PCT AUTO % 12.9 22.6   MONOS PCT AUTO % 7.8 8.1   EOS PCT AUTO % 0.9 2.4     Results from last 72 hours   Lab Units 01/21/24  0436 01/20/24  0449 01/19/24  1400   SODIUM mmol/L 133 135 131*   POTASSIUM mmol/L 3.9 4.5 4.2   CHLORIDE mmol/L 97 96* 93*   CO2 mmol/L 23* 23* 28   BUN mg/dL 24 19 15   CREATININE mg/dL 3.90* 3.40* 2.90*   GLUCOSE mg/dL 209* 138* 130*   CALCIUM mg/dL 8.7 8.6 8.0*   ANION GAP mmol/L 13 16 10   EGFR mL/min/1.73m*2 12* 14* 17*   PHOSPHORUS mg/dL 4.2 2.8  --      Results from last 72 hours   Lab Units 01/21/24  0436 01/20/24  1750 01/20/24  0449 01/19/24  1400   ALK PHOS U/L 113  --   --  107   BILIRUBIN TOTAL mg/dL 1.0  --   --  1.0   PROTEIN TOTAL g/dL 5.5* 5.4*  --  5.3*   ALT U/L 11  --   --  13   AST U/L 19  --   --  30   ALBUMIN g/dL 2.1*  --  2.5* 2.4*     Estimated Creatinine Clearance: 12.4 mL/min (A) (by C-G formula  based on SCr of 3.9 mg/dL (H)).  C-Reactive Protein   Date Value Ref Range Status   12/25/2023 5.20 (H) 0.00 - 2.00 mg/dL Final     CRP   Date Value Ref Range Status   06/23/2023 19.9 (H) 0 - 2.0 MG/DL Final     Comment:     [facility] at [place] [address] [facility] [id number]   05/22/2022 1.0 0 - 2.0 MG/DL Final     Comment:     Performed at [address] [address]     Microbiology  -Positive urine culture for gram-negative bacilli  Imaging  CT chest abdomen pelvis wo IV contrast    Result Date: 1/21/2024  Interpreted By:  Maria Garcia, STUDY: CT CHEST ABDOMEN PELVIS WO CONTRAST;  1/20/2024 11:42 pm   INDICATION: Mental status change. Elevated white blood cell count with infectious workup.   COMPARISON: 08/20/2023   ACCESSION NUMBER(S): [id number]   ORDERING CLINICIAN: BAN GIFFORD   TECHNIQUE: CT of the chest, abdomen and pelvis was performed with no oral or intravenous contrast administered. Sagittal and coronal reformations were completed by the technologist at the acquisition scanner.   All CT examinations are performed with 1 or more of the following dose reduction techniques: Automated exposure control, adjustment of mA and/or kv according to patient's size, or use of iterative reconstruction techniques.   FINDINGS: Please note that the study is limited without intravenous contrast.   CHEST: Bilateral pleural effusions are present with right effusion moderately small in size and with the left effusion moderately small in size as well. There is shift of the heart and mediastinum to the left of midline due to atelectasis of the left upper lobe. There is consolidation throughout left lower lobe with air bronchograms noted. On the right, there is compressive atelectasis seen posteriorly in the right lower lobe.   There is mild reactive mediastinal adenopathy seen at this time with mediastinal lymph nodes increased in size since prior study. Several of these mediastinal nodes are at  the upper limits of normal measuring 8 mm in short axis diameter.   No pericardial effusion is present. The cardiac size is normal with mitral annulus calcification.   There has been prior bilateral mastectomy with reconstruction of the left breast with implant placement. Surgical clips are visible within the left axilla. Stent grafts are visible within the left upper extremity and within the left innominate, subclavian as well as axillary veins.   A peripherally calcified 1.8 cm nodule arises from the lower pole of left thyroid lobe.   ABDOMEN:   LIVER: Unremarkable.   BILE DUCTS: No intrahepatic or extrahepatic biliary ductal dilatation is seen.   GALLBLADDER: Cholelithiasis is observed with some calcification of the gallbladder wall demonstrated as well. No gallbladder wall thickening or pericholecystic fluid is evident.   PANCREAS: Unremarkable   SPLEEN: Unremarkable   ADRENAL GLANDS: Unremarkable   KIDNEYS AND URETERS: Kidneys are small in size with extensive renal artery calcification demonstrated.   PELVIS:   BLADDER: Amos catheter is present within the decompressed urinary bladder.   REPRODUCTIVE ORGANS: Calcification of the arcuate arteries within the uterus is seen. There is no uterine enlargement or adnexal mass.   BOWEL: A rectal balloon is seen. There is no abnormal distention of the intestinal tract.   VESSELS: There is atherosclerosis of the thoracoabdominal aorta and iliac arteries with calcification in the walls of the celiac, splenic, hepatic, and mesenteric arteries.   PERITONEUM/RETROPERITONEUM/LYMPH NODES: There is a minimal amount of ascites seen about the liver and spleen with mild presacral edema noted.   ABDOMINAL WALL: There is anasarca involving the soft tissues of the abdomen and pelvis. Postoperative change from ventral hernia repair is seen.   BONES: Bilateral sacroiliitis is seen. There is lumbar dextroscoliosis. Chronic rotator cuff tear is present bilaterally with osteoarthritis of  both shoulders, right greater than left.       Chest 1.  Moderately small bilateral pleural effusions with left upper lobe atelectasis and compressive atelectasis seen posteriorly in right lower lobe. A left lower lobe pneumonia is identified. There is extensive airspace consolidation within left lower lobe with air bronchograms observed. Mild mediastinal reactive adenopathy is present as well.   Abdomen-Pelvis 1.  Cholelithiasis without evidence of cholecystitis. 2. Small amount of ascites with mild presacral edema and anasarca within the soft tissues of the abdominal wall. 3. Renal atrophy with extensive vascular calcifications.     MACRO: None   Signed by: Maria Garcia 1/21/2024 8:30 AM Dictation workstation:   [id number]    CT head wo IV contrast    Result Date: 1/21/2024  Interpreted By:  Maria Garcia, STUDY: CT HEAD WO IV CONTRAST 1/20/2024 11:42 pm   INDICATION: Signs/Symptoms:mental status changes   COMPARISON: 12/11/2023   ACCESSION NUMBER(S): [id number]   ORDERING CLINICIAN: BAN GIFFORD   TECHNIQUE: Unenhanced axial images of the brain are completed.   All CT examinations are performed with 1 or more of the following dose reduction techniques: Automated exposure control, adjustment of mA and/or kv according to patient's size, or use of iterative reconstruction techniques.   FINDINGS: Helical unenhanced axial images of the brain demonstrate a mild to moderate degree of ventricular enlargement with proportionate widening of the sulci and sylvian fissures. There is no midline shift, mass effect, extra-axial fluid collection, or acute intracranial hemorrhage. There is diminished density seen in the periventricular white matter indicating chronic microvascular ischemic disease. There is calcified plaque seen within the distal vertebral and internal carotid arteries bilaterally. No calvarial abnormality is seen.       Atrophy and chronic microvascular ischemic disease without acute intracranial process.    Signed by: Maria Garcia 1/21/2024 8:09 AM Dictation workstation:   [id number]    XR chest 1 view    Result Date: 1/20/2024  Interpreted By:  Brendon Perez, STUDY: XR CHEST 1 VIEW; 1/20/2024 5:03 pm   INDICATION: CLINICAL INFORMATION: Signs/Symptoms:Insertion right IJ central line, also left thoracentesis.   COMPARISON: 01/19/2024 at 1338 hours   ACCESSION NUMBER(S): [id number]   ORDERING CLINICIAN: BOZENA ANTONIO   TECHNIQUE: Portable chest one view.   FINDINGS: The cardiac size is indeterminate in view of the AP projection. There is no change in the right-sided dual port central venous catheter. There is a new right internal jugular venous catheter present with the tip at approximately same level as the dual port central venous catheter, overlying the mid to lower right atrium. There is no evidence for pneumothorax. Patient has a history of left thoracentesis without evidence for pneumothorax on the left. There is bilateral basilar alveolar infiltrate and effusions. Left effusion is decreased compared to the study from 1 day earlier.   Surgical clips are identified within left chest wall. Endovascular stent is identified in the distribution of the left subclavian artery.       1. Status post right-sided central venous catheter placement as described above with the tip overlying the mid to caudal aspect of the right atrium. There is no evidence for pneumothorax. 2. No evidence for left-sided pneumothorax after left-sided thoracentesis. Decrease in the left effusion. 3. Bilateral basilar infiltrates and effusions are present. Follow-up to assure complete clearing is suggested.   MACRO: none   Signed by: Brendon Perez 1/20/2024 5:11 PM Dictation workstation:   [id number]    XR chest 1 view    Result Date: 1/19/2024  Interpreted By:  Real Mendieta, STUDY: XR CHEST 1 VIEW; 1/19/2024 1:38 pm   INDICATION: Signs/Symptoms:dyspnea.   COMPARISON: 12/26/2023   ACCESSION NUMBER(S): [id number]   ORDERING CLINICIAN:  EMELY GOLDSMITH   TECHNIQUE: 1 view of the chest was performed.   FINDINGS: There may be a minimal right pleural effusion. Slight prominence of the interstitium otherwise the right lung is clear. Improved appearance of the left lung with improved aeration of the right upper lobe. There is opacification of the left lower lobe possibly due to large pleural effusion which is similar to the prior study. No pneumothorax. Right-sided dual lumen central line catheter with tip at the proximal atrium. The cardiomediastinal silhouette is within normal limits. Left-sided subclavian stents are again noted.       Improved aeration and appearance of the left lung superiorly however there is a persistent large left pleural effusion and opacification of the left lower lobe.   Signed by: Real Mendieta 1/19/2024 1:44 PM Dictation workstation:   [id number]    Electrocardiogram, 12-lead PRN ACS symptoms    Result Date: 1/7/2024  Sinus rhythm Right bundle branch block Septal infarct (cited on or before 06-DEC-2023) Abnormal ECG When compared with ECG of 06-DEC-2023 13:06, Questionable change in initial forces of Septal leads Confirmed by Herminio Lemus ([id number]) on 1/7/2024 11:04:01 AM    XR chest 1 view    Result Date: 12/26/2023  Interpreted By:  Rikki Patel, STUDY: XR CHEST 1 VIEW; 12/26/2023 12:23 pm   INDICATION: Signs/Symptoms:hypoxia   COMPARISON: 12/19/2023   ACCESSION NUMBER(S): [id number]   ORDERING CLINICIAN: BRITTANI TOLEDO   FINDINGS: The study is limited due to rotation. Dialysis catheter is stable in position. The cardiac silhouette is indeterminate due to the technique. There is interval significant worsening of large left effusion and left lung atelectasis/infiltrates, with complete opacification of the left hemithorax. There is no pneumothorax. Vascular stents are noted in subclavian vessels. Surgical staples are again seen in the left axilla. The osseous structures are unchanged.       Limited study. Interval  worsening of large left pleural effusion and left lung infiltrates/atelectasis, with complete opacification of the left hemithorax.   Signed by: Rikki Patel 12/26/2023 12:40 PM Dictation workstation:   [id number]     Assessment/Plan   Septic shock  Left-sided pleural effusion sedated  Left lower lobe pneumonia  Gram-negative urinary tract infection  History of MRSA endocarditis  History of C. 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

## 2024-01-21 NOTE — NURSING NOTE
1115 AL Sanchez CNP with palliative at bedside. Patient currently NPO due to mentation and lethargy. Patient is more awake now and family is at bedside. AL Sanchez CNP does bedside swallow eval while in room and passes. Patient family member starts feeding patient with approval from AL Sanchez CNP in room.    1516 Family asking for multiple milks for patient. AL Sanchez CNP on unit and informs family due to being a dialysis patient, patient can only have 1/2 cup of milk per day. Reviewed orders with AL Sanchez CNP and patient is still NPO at this time and a regular diet previously. Secure chat sent to JEANNIE KELLY regarding diet status as well as AL Sanchez CNP spoke directly with him.     1730 Discussed NPO status with both Dr. Barreto and JEANNIE KELLY due to patient being continuously fed by family. Dr. Barreto states he would like a formal speech eval done first by speech therapy due to fluctuating mentation in last 24 hours. Discussed that patient has been receiving medications with thin liquids with no apparent issue and family members having been feeding patient despite ordered NPO with no adverse issues as well. Patient has been awake and alert most of afternoon and evening. States would like nursing to assess patient mentation and alertness prior to feeding every time. Waiting for orders to be placed.

## 2024-01-21 NOTE — PROGRESS NOTES
[facility] Critical Care Medicine       Date:  1/21/2024  Patient:  Ayanna Gross  YOB: 1950  MRN:  [id number]   Admit Date:  1/19/2024  ========================================================================================================    Chief Complaint   Patient presents with    Shortness of Breath     Patient with SOB and fever. Is very lethargic. Dialysis MWF but did not go today dt SOB and fever         History of Present Illness:  Ayanna Gross is a 73 y.o. female presenting with anemia, fever worsening sacral and lower extremity wounds and concerns from the nursing facility for left pneumonia. Patient presents to the emergency department found to be febrile she was hypotensive but responded to 500 cc fluid bolus she was found to have a hemoglobin of 6 she was begun and ordered for a single unit of blood transfusion will actually provide 2 units of packed cells additional IV fluids to complete her volume resuscitation given her fever clinical evidence of hypovolemia and infection with an elevated lactate of 2.3 she received a total of approximately 1800 mL of fluid she is DNR/DNI by advanced directives but ICU level care is acceptable her granddaughter questions disorder but it was confirmed and signed by her  who is her POA.  She will be admitted to the intensive care unit given her frail compromised state and high risk of morbidity and mortality necessitating IV antibiotics fluids blood transfusion and ultimately will receive dialysis likely tomorrow wound care consultation to general surgery has been requested as well as consultation with the intensivist infectious disease and nephrology wounds are cultured C. difficile is requested she remains on oral vancomycin for suppression given recent C. difficile infection in December she is initiated on pharmacy dosing of vancomycin and Zosyn 2.25 g IV every 6 hours. She continues on Eliquis at this time for DVT prophylaxis as well as  atrial fibrillation.     Further Hx: Ayanna Gross 73-year-old female with past medical history of atrial fibrillation, end-stage renal disease, C. difficile infection, moderate sized recurrent left pleural effusions, and MRSA bacteremia with mitral valve vegetation admitted to ICU for management of septic shock.  On morning exam patient was on 0.22 of levo just to maintain a MAP of 60 and SBP of 88.  Last documented echocardiogram from [facility] shows EF between 55 to 60% with grade 2 left ventricular diastolic dysfunction, and moderate circumferential pericardial effusion.  Unable to find adequate documentation of who treated her endocarditis, but there was documentation that she was currently still receiving treatment when she was admitted to [facility]. On further chart review Dr. Coulter is her infectious disease doctor. She also had 2 thoracentesis's for moderate to large sided left pleural effusions with cytology showing no malignancy, but unable to differentiate between exudative and transudative at this time.      Interval ICU Events:  1/20: Pt missed last dialysis appointment, no signs of urgent need today, but nephrology Dr. Cabello is following. Pt on high requirements of levophed. She is on broad spectrum abx for coverage currently ID Dr. Coulter following. Pt switches from being A&O x 3 and lethargy. Goal today is to get more central access, david, and thoracentesis.     1/21: Pt more lethargic this AM requiring uptitration of her Levophed to 0.28. Diagnostic and therapeutic thoracentesis yesterday over 1L of hazy straw color fluid. Currently still on vanc, zosyn, and doxy for abx. Potential dialysis this today, but no urgency.     Medical History:  Past Medical History:   Diagnosis Date    Asthma     CAD (coronary artery disease)     CHF (congestive heart failure) (CMS/[facility])     Chronic kidney disease on chronic dialysis (CMS/[facility])     Hypertension     Personal history of  diseases of the blood and blood-forming organs and certain disorders involving the immune mechanism     History of autoimmune disorder    Sleep apnea     Type 2 diabetes mellitus without complications (CMS/[facility]) 09/15/2022    Diabetes mellitus     Past Surgical History:   Procedure Laterality Date    CARPAL TUNNEL RELEASE  11/18/2013    Neuroplasty Decompression Median Nerve At Carpal Tunnel    HAND SURGERY  11/18/2013    Hand Surgery                                                                                                                                                          MASTECTOMY, PARTIAL  04/10/2014    Right Breast Partial Mastectomy    MR HEAD ANGIO WO IV CONTRAST  8/29/2023    MR HEAD ANGIO WO IV CONTRAST [facility] INPATIENT LEGACY    OTHER SURGICAL HISTORY  11/18/2013    Breast Reconstruction With Implant Prosthesis    OTHER SURGICAL HISTORY  11/18/2013    Thyroid Surgery Substernal Thyroidectomy Partial    OTHER SURGICAL HISTORY  11/18/2013    Modified Radical Mastectomy Left Breast    OTHER SURGICAL HISTORY  04/04/2022    Carpal tunnel surgery    OTHER SURGICAL HISTORY  04/04/2022    Foot surgery    OTHER SURGICAL HISTORY  04/04/2022    Hernia repair    SENTINEL LYMPH NODE BIOPSY  04/10/2014    [place] Lymph Node Biopsy    TONSILLECTOMY  11/18/2013    Tonsillectomy    UMBILICAL HERNIA REPAIR  11/18/2013    Umbilical Hernia Repair    US GUIDED PERCUTANEOUS PLACEMENT  6/29/2023    US GUIDED PERCUTANEOUS PLACEMENT [facility] INPATIENT LEGACY     Medications Prior to Admission   Medication Sig Dispense Refill Last Dose    apixaban (Eliquis) 2.5 mg tablet Take 1 tablet (2.5 mg) by mouth 2 times a day.       blood sugar diagnostic strip 1 x daily e11.65 for 90 days       blood-glucose sensor (Dexcom G6 Sensor) device Check blood sugar when needed and as instructed 6 each 3     doxycycline (Vibramycin) 100 mg capsule Take 1 capsule (100 mg) by mouth once daily. Take with at least 8 ounces (large glass) of water,  do not lie down for 30 minutes after Do not start before January 6, 2024.       escitalopram (Lexapro) 10 mg tablet 1 tablet Orally Once a day for 30 day(s)       febuxostat (Uloric) 40 mg tablet TAKE ONE TABLET BY MOUTH EVERY OTHER DAY       fenofibrate (Triglide) 160 mg tablet Take 1 tablet (160 mg) by mouth once daily.       gabapentin (Neurontin) 100 mg capsule Take 1 capsule (100 mg) by mouth 2 times a day.       insulin lispro (HumaLOG) 100 unit/mL injection Inject 0-0.1 mL (0-10 Units) under the skin 3 times a day with meals. Take as directed per insulin instructions.       midodrine (Proamatine) 5 mg tablet Take 3 tablets (15 mg) by mouth 3 times a day with meals.       nystatin (Mycostatin) 100,000 unit/gram powder APPLY 1 GRAM TO SKIN TWO TIMES A DAY 30 g 0     oxyCODONE-acetaminophen (Percocet) 5-325 mg tablet Take 1 tablet by mouth every 12 hours if needed for severe pain (7 - 10). 120 tablet 0     simvastatin (Zocor) 20 mg tablet TAKE 1 TABLET BY MOUTH DAILY 60 tablet 5      Patient has no known allergies.  Social History     Tobacco Use    Smoking status: Never    Smokeless tobacco: Never   Substance Use Topics    Alcohol use: Not Currently    Drug use: Never     Family History   Problem Relation Name Age of Onset    Lymphoma Mother      Prostate cancer Father          passed away [address] Medications:    norepinephrine, 0.01-3 mcg/kg/min, Last Rate: 0.3 mcg/kg/min (01/21/24 0843)          Current Facility-Administered Medications:     [Held by provider] apixaban (Eliquis) tablet 2.5 mg, 2.5 mg, oral, BID, Judd Tavera DO, 2.5 mg at 01/19/24 2127    dextrose 10 % in water (D10W) infusion, 0.3 g/kg/hr, intravenous, Once PRN, Judd Tavera DO    dextrose 50 % injection 25 g, 25 g, intravenous, q15 min PRN, Judd Tavera DO    doxycycline (Vibramycin) in dextrose 5 % in water (D5W) 100 mL  mg, 100 mg, intravenous, q24h, Yusuf De La Garza PA-C, Stopped at  01/20/24 1849    escitalopram (Lexapro) tablet 10 mg, 10 mg, oral, Nightly, Judd Tavera DO, 10 mg at 01/19/24 2127    febuxostat (Uloric) tablet 40 mg, 40 mg, oral, Every other day, Judd Tavera DO    fenofibrate (Triglide) tablet 160 mg, 160 mg, oral, Daily, Judd Tavera DO, 160 mg at 01/19/24 2128    [Held by provider] gabapentin (Neurontin) capsule 100 mg, 100 mg, oral, BID, Judd Tavera DO, 100 mg at 01/19/24 2127    glucagon (Glucagen) injection 1 mg, 1 mg, intramuscular, q15 min PRN, Judd Tavera DO    insulin lispro (HumaLOG) injection 0-10 Units, 0-10 Units, subcutaneous, TID with meals, Judd Tavera DO, 2 Units at 01/20/24 0919    midodrine (Proamatine) tablet 15 mg, 15 mg, oral, TID with meals, Judd Tavera DO, 15 mg at 01/20/24 0742    norepinephrine (Levophed) 8 mg in dextrose 5% 250 mL (0.032 mg/mL) infusion (premix), 0.01-3 mcg/kg/min, intravenous, Continuous, Daniel Rubio DO, Last Rate: 41.8 mL/hr at 01/21/24 0843, 0.3 mcg/kg/min at 01/21/24 0843    nystatin (Mycostatin) 100,000 unit/gram powder, , Topical, BID, Judd Tavera DO, Given at 01/21/24 0825    oxyCODONE-acetaminophen (Percocet) 5-325 mg per tablet 1 tablet, 1 tablet, oral, q8h PRN, Judd Tavera DO, 1 tablet at 01/19/24 2130    oxygen (O2) therapy, , inhalation, Continuous PRN - O2/gases, Judd Tavera DO    pantoprazole (ProtoNix) EC tablet 40 mg, 40 mg, oral, Daily **OR** pantoprazole (ProtoNix) injection 40 mg, 40 mg, intravenous, Daily, Yusuf De La Garza PA-C, 40 mg at 01/21/24 0824    piperacillin-tazobactam-dextrose (Zosyn) IV 2.25 g, 2.25 g, intravenous, q8h, Judd Tavera DO, Stopped at 01/21/24 0506    sennosides-docusate sodium (Judy-Colace) 8.6-50 mg per tablet 1 tablet, 1 tablet, oral, Nightly PRN, Yusuf De La Garza PA-C    simvastatin (Zocor) tablet 20 mg, 20 mg, oral, Nightly, Judd Tavera DO, 20 mg at 01/19/24 7671     vancomycin (Vancocin) capsule 125 mg, 125 mg, oral, 4x daily, Judd Tavera, , 125 mg at 01/20/24 0644    vancomycin (Vancocin) placeholder, , miscellaneous, Daily PRN, Kendall Alfonso, Camilla    Review of Systems:  14 point review of systems was completed and negative except for those specially mention in my HPI    Physical Exam:    Heart Rate:  []   Temp:  [35.6 °C (96.1 °F)-36.3 °C (97.3 °F)]   Resp:  [11-24]   BP: ()/(47-83)   Weight:  [73.1 kg (161 lb 2.5 oz)]   SpO2:  [78 %-98 %]     Physical Exam  Vitals and nursing note reviewed.   Constitutional:       General: She is sleeping. She is in acute distress.      Appearance: She is overweight. She is ill-appearing.      Interventions: Nasal cannula in place.   HENT:      Nose: No congestion.      Mouth/Throat:      Mouth: Mucous membranes are dry.      Pharynx: Oropharyngeal exudate present.   Eyes:      General: No scleral icterus.     Pupils: Pupils are equal, round, and reactive to light.   Cardiovascular:      Rate and Rhythm: Normal rate and regular rhythm.      Heart sounds: Murmur heard.   Pulmonary:      Effort: Pulmonary effort is normal.      Breath sounds: Wheezing, rhonchi and rales present.   Abdominal:      General: There is no distension.      Palpations: Abdomen is soft.      Tenderness: There is no guarding or rebound.   Musculoskeletal:         General: Swelling, deformity and signs of injury present.      Cervical back: No tenderness.      Right lower leg: Edema present.      Left lower leg: Edema present.   Lymphadenopathy:      Cervical: No cervical adenopathy.   Skin:     General: Skin is dry.      Capillary Refill: Capillary refill takes less than 2 seconds.      Findings: Bruising, erythema and rash present.          Neurological:      Mental Status: She is lethargic and disoriented.      Cranial Nerves: No cranial nerve deficit.      Motor: Weakness present.   Psychiatric:         Attention and Perception: She  is inattentive.         Mood and Affect: Mood is anxious. Affect is tearful.         Objective:    I have reviewed all medications, laboratory results, and imaging pertinent for today's encounter.           Intake/Output Summary (Last 24 hours) at 1/21/2024 0844  Last data filed at 1/21/2024 0608  Gross per 24 hour   Intake 1119.24 ml   Output --   Net 1119.24 ml         Assessment/Plan:     I am currently managing this critically ill patient for the following problems:  Septic Shock   ESRD   Chronic Hypotension   Recurrent Pleural Effusions   Vasculopathy   Multiple Wounds with and with out infections  C. Diff infection     Plan:  Neuro:  AMS   -Pain Control: Tylenol for mild, Percocet for moderate   -CAM Assessment, Neuro checks every shift   -Palliative consulted   -hold home Lexapro and gabapentin  -Pending CT head      CV:   Hx of endocarditis   Septic Shock  Chronic Hypotension  Vasculopathy   -Levophed started, currently 0.28, maintain SBP > 80  -Hold home midodrine 15 mg TID, 2/2 AMS   -CVC/ Chandni placed yesterday   -Elevated troponins 2/2 shock state   -Continuous cardiac monitoring  -maintain MAPS >65     Resp:  Ct chest showed multiple nodules on lung in August 23   Moderate to large left sided pleural effusion   -Thoracentesis yesterday over 1L of hazy straw colored fluid  -SP02 >92%, Nasal canula titration   -Pending CT C/A/P   -continuous pulse ox  -Pulm hygiene      GI:   -NPO 2/2 AMS   -Flexacil for stools   -BR with senna/colace PRN   -Protonix daily      :  ESRD   -Nephrology consulted for dialysis order, High dose of pressors required, Appreciate nephrology consult   -Elevated Lactate 2/2 hypoperfusion   -Maintain de la cruz catheter  -Avoid Large amounts of fluids   -Replete electrolytes as indicated for ESRD    -Continue Uloric for gout prophylaxis      Endo:  DM2   -SSI Q4h while NPO     Heme:   Anemia of chronic disease   -monitor for s/s anemia  -transfuse for hgb <7  -daily CBC     MSK:    Multiple Wounds with and with out infections  -Surgery consulted for sacral wound   -Wound care consulted for dressing other wounds   -padded pressure points  -ICU skin protocol     ID:  Hx of MRSA bacteremia with endocarditis  Multiple Wounds with and with out infections  C. Diff infection  -WBC 22.9 - 21.0 Vanc + Zoysn  -Per ID add doxycycline 100 IV every 24 hours   -blood cultures No growth day one   -urine cultures pending   -Pro-maría 1.33  -ID Consulted   -C. Diff oral vancomycin when able to tolerate  -Appreciate ID consult     Ethics/Code Status:  Status was changed to Full code by daughter Meka Bills who has supplied documentation that she is medical power of       :  DVT Prophylaxis: Heparin   GI Prophylaxis: Protonix  Bowel Regimen: None C. Diff infection  Diet: NPO  CVC: right internal jugular 1/20  [place]: right radial 1/20  Amos: yes dc today   Restraints: None  Dispo: ICU      This note was prepared using voice recognition software. The details of this note are correct and have been reviewed, and corrected to the best of my ability. Some grammatical areas may persist related to the Dragon software.      I have reviewed all medications, laboratory results, and imaging pertinent for today's encounter.  Plan Discussed with Dr. Barreto  Critical Care Time: 55 minutes  Critical Care Chana De La Garza PA-C

## 2024-01-21 NOTE — CARE PLAN
Problem: Pain  Goal: My pain/discomfort is manageable  Outcome: Progressing     Problem: Safety  Goal: Patient will be injury free during hospitalization  Outcome: Progressing  Goal: I will remain free of falls  Outcome: Progressing     Problem: Daily Care  Goal: Daily care needs are met  Outcome: Progressing     Problem: Psychosocial Needs  Goal: Demonstrates ability to cope with hospitalization/illness  Outcome: Progressing  Goal: Collaborate with me, my family, and caregiver to identify my specific goals  Outcome: Progressing     Problem: Discharge Barriers  Goal: My discharge needs are met  Outcome: Progressing     Problem: Fall/Injury  Goal: Not fall by end of shift  Outcome: Progressing  Goal: Be free from injury by end of the shift  Outcome: Progressing  Goal: Verbalize understanding of personal risk factors for fall in the hospital  Outcome: Progressing  Goal: Verbalize understanding of risk factor reduction measures to prevent injury from fall in the home  Outcome: Progressing  Goal: Use assistive devices by end of the shift  Outcome: Progressing  Goal: Pace activities to prevent fatigue by end of the shift  Outcome: Progressing     Problem: Skin  Goal: Decreased wound size/increased tissue granulation at next dressing change  Outcome: Progressing  Flowsheets (Taken 1/21/2024 6097)  Decreased wound size/increased tissue granulation at next dressing change:   Promote sleep for wound healing   Protective dressings over bony prominences  Goal: Participates in plan/prevention/treatment measures  Outcome: Progressing  Flowsheets (Taken 1/21/2024 1427)  Participates in plan/prevention/treatment measures: Elevate heels  Goal: Prevent/manage excess moisture  Outcome: Progressing  Goal: Prevent/minimize sheer/friction injuries  Outcome: Progressing  Goal: Promote/optimize nutrition  Outcome: Progressing  Goal: Promote skin healing  Outcome: Progressing     Problem: Pain  Goal: Takes deep breaths with improved pain  control throughout the shift  Outcome: Progressing  Goal: Turns in bed with improved pain control throughout the shift  Outcome: Progressing  Goal: Walks with improved pain control throughout the shift  Outcome: Progressing  Goal: Performs ADL's with improved pain control throughout shift  Outcome: Progressing  Goal: Participates in PT with improved pain control throughout the shift  Outcome: Progressing  Goal: Free from opioid side effects throughout the shift  Outcome: Progressing  Goal: Free from acute confusion related to pain meds throughout the shift  Outcome: Progressing     Problem: Respiratory  Goal: Clear secretions with interventions this shift  Outcome: Progressing  Goal: Minimize anxiety/maximize coping throughout shift  Outcome: Progressing  Goal: Minimal/no exertional discomfort or dyspnea this shift  Outcome: Progressing  Goal: No signs of respiratory distress (eg. Use of accessory muscles. Peds grunting)  Outcome: Progressing  Goal: Patent airway maintained this shift  Outcome: Progressing  Goal: Tolerate mechanical ventilation evidenced by VS/agitation level this shift  Outcome: Progressing  Goal: Tolerate pulmonary toileting this shift  Outcome: Progressing  Goal: Verbalize decreased shortness of breath this shift  Outcome: Progressing  Goal: Wean oxygen to maintain O2 saturation per order/standard this shift  Outcome: Progressing  Goal: Increase self care and/or family involvement in next 24 hours  Outcome: Progressing

## 2024-01-21 NOTE — NURSING NOTE
Dressings changed to bilat heels. Dry sterile dressings applied. No changes to wounds. Thick layer of eschar noted on both. No odor or drainage. Boots on patient, waffle mattress in place.

## 2024-01-21 NOTE — PROGRESS NOTES
Ayanna Gross is a 73 y.o. female on day 2 of admission presenting with Septic shock (CMS/HCC).    Subjective   Symptoms (0 - 10, Best to Worst)  [place] Symptom Assessment System  Pain Score: 0 - No pain  Increased levophed requirements today. More alert, able to eat/drink with assist from daughter. Denies pain or discomfort.        Objective     Vitals and nursing note reviewed.   Constitutional:       General: She is not in acute distress.     Appearance: She is ill-appearing.      Comments: Chronically ill-appearing, lying in bed in ICU   HENT:      Head: Normocephalic and atraumatic.      Nose: Nose normal.      Mouth/Throat:      Mouth: Mucous membranes are moist.      Pharynx: Oropharynx is clear.   Eyes:      Extraocular Movements: Extraocular movements intact.      Pupils: Pupils are equal, round, and reactive to light.   Cardiovascular:      Rate and Rhythm: Normal rate and regular rhythm.      Pulses: Normal pulses.      Heart sounds: No murmur heard.  Pulmonary:      Effort: Pulmonary effort is normal. No respiratory distress.      Comments: Diminished lung bases  Nasal cannula 6L  Abdominal:      General: Abdomen is flat. Bowel sounds are normal. There is no distension.      Palpations: Abdomen is soft.      Tenderness: There is no abdominal tenderness.   Musculoskeletal:         General: No swelling, tenderness, deformity or signs of injury. Normal range of motion.      Cervical back: Normal range of motion and neck supple.   Skin:     General: Skin is warm and dry.      Capillary Refill: Capillary refill takes 2 to 3 seconds.      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   Neurological:      General: No focal deficit present.      Mental Status: She is alert and oriented to person, place, and time.      Motor: Weakness present.      Comments: Slowed responses, somewhat somnolent  "waxing and waning mental status   Psychiatric:         Mood and Affect: Mood normal.        Last Recorded Vitals  Blood pressure 85/65, pulse 95, temperature 35.6 °C (96.1 °F), temperature source Temporal, resp. rate 18, height 1.575 m (5' 2\"), weight 73.1 kg (161 lb 2.5 oz), SpO2 91 %.  Intake/Output last 3 Shifts:  I/O last 3 completed shifts:  In: 1685.3 (23.1 mL/kg) [I.V.:935.3 (12.8 mL/kg); IV Piggyback:750]  Out: 25 (0.3 mL/kg) [Urine:25 (0 mL/kg/hr)]  Weight: 73.1 kg     Relevant Results  Results for orders placed or performed during the hospital encounter of 01/19/24 (from the past 24 hour(s))   pH, Body Fluid   Result Value Ref Range    pH, Fluid 7.20 See Below   Lactate Dehydrogenase, Fluid   Result Value Ref Range    LD, Fluid 64 Not established. U/L   Glucose, Fluid   Result Value Ref Range    Glucose, Fluid 173 Not established mg/dL   Protein, Total Fluid   Result Value Ref Range    Protein, Total Fluid 1.3 Not established g/dL   Body Fluid Cell Count   Result Value Ref Range    Color, Fluid Straw Colorless, Straw, Yellow    Clarity, Fluid Clear Clear    WBC, Fluid 67 See Comment /uL    RBC, Fluid 1,000 0  /uL /uL   Body Fluid Differential   Result Value Ref Range    Neutrophils %, Manual, Fluid 12 <25 % %    Lymphocytes %, Manual, Fluid 36 <75 % %    Mono/Macrophages %, Manual, Fluid 52 <70 % %    Eosinophils %, Manual, Fluid 0 0 % %    Basophils %, Manual, Fluid 0 0 % %    Immature Granulocytes %, Manual, Fluid 0 0 % %    Blasts %, Manual, Fluid 0 0 % %    Unclassified Cells %, Manual, Fluid 0 0 % %    Plasma Cells %, Manual, Fluid 0 0 % %    Total Cells Counted, Fluid 100    Triglycerides, Fluid   Result Value Ref Range    Triglycerides, Fluid 33 No established mg/dL   Amylase, Fluid   Result Value Ref Range    Amylase, Fluid <10 Not established. U/L   Procalcitonin   Result Value Ref Range    Procalcitonin 2.12 (H) <=0.07 ng/mL   Blood Gas Venous Full Panel   Result Value Ref Range    POCT pH, " Venous 7.30 (L) 7.33 - 7.43 pH    POCT pCO2, Venous 49 41 - 51 mm Hg    POCT pO2, Venous 149 (H) 35 - 45 mm Hg    POCT SO2, Venous 100 (H) 45 - 75 %    POCT Oxy Hemoglobin, Venous 97.3 (H) 45.0 - 75.0 %    POCT Hematocrit Calculated, Venous 35.0 (L) 36.0 - 46.0 %    POCT Sodium, Venous 129 (L) 136 - 145 mmol/L    POCT Potassium, Venous 3.7 3.5 - 5.3 mmol/L    POCT Chloride, Venous 96 (L) 98 - 107 mmol/L    POCT Ionized [place], Venous 1.15 1.10 - 1.33 mmol/L    POCT Glucose, Venous 187 (H) 74 - 99 mg/dL    POCT Lactate, Venous 2.0 0.4 - 2.0 mmol/L    POCT Base Excess, Venous -2.6 (L) -2.0 - 3.0 mmol/L    POCT HCO3 Calculated, Venous 24.1 22.0 - 26.0 mmol/L    POCT Hemoglobin, Venous 11.5 (L) 12.0 - 16.0 g/dL    POCT Anion Gap, Venous 13.0 10.0 - 25.0 mmol/L    Patient Temperature 37.0 degrees Celsius    FiO2 30 %   Lactate Dehydrogenase   Result Value Ref Range    LDH     Protein, Total   Result Value Ref Range    Total Protein 5.4 (L) 5.9 - 7.9 g/dL   POCT GLUCOSE   Result Value Ref Range    POCT Glucose 129 (H) 74 - 99 mg/dL   POCT GLUCOSE   Result Value Ref Range    POCT Glucose 192 (H) 74 - 99 mg/dL   CBC and Auto Differential   Result Value Ref Range    WBC 21.0 (H) 4.4 - 11.3 x10*3/uL    nRBC 0.0 0.0 - 0.0 /100 WBCs    RBC 3.93 (L) 4.00 - 5.20 x10*6/uL    Hemoglobin 11.6 (L) 12.0 - 16.0 g/dL    Hematocrit 36.5 36.0 - 46.0 %    MCV 93 80 - 100 fL    MCH 29.5 26.0 - 34.0 pg    MCHC 31.8 (L) 32.0 - 36.0 g/dL    RDW 24.2 (H) 11.5 - 14.5 %    Platelets 424 150 - 450 x10*3/uL    Neutrophils % 77.0 40.0 - 80.0 %    Immature Granulocytes %, Automated 0.9 0.0 - 0.9 %    Lymphocytes % 12.9 13.0 - 44.0 %    Monocytes % 7.8 2.0 - 10.0 %    Eosinophils % 0.9 0.0 - 6.0 %    Basophils % 0.5 0.0 - 2.0 %    Neutrophils Absolute 16.18 (H) 1.60 - 5.50 x10*3/uL    Immature Granulocytes Absolute, Automated 0.19 0.00 - 0.50 x10*3/uL    Lymphocytes Absolute 2.71 0.80 - 3.00 x10*3/uL    Monocytes Absolute 1.63 (H) 0.05 - 0.80  x10*3/uL    Eosinophils Absolute 0.19 0.00 - 0.40 x10*3/uL    Basophils Absolute 0.10 0.00 - 0.10 x10*3/uL   Magnesium   Result Value Ref Range    Magnesium 1.50 (L) 1.60 - 3.10 mg/dL   Comprehensive metabolic panel   Result Value Ref Range    Glucose 209 (H) 65 - 99 mg/dL    Sodium 133 133 - 145 mmol/L    Potassium 3.9 3.4 - 5.1 mmol/L    Chloride 97 97 - 107 mmol/L    Bicarbonate 23 (L) 24 - 31 mmol/L    Urea Nitrogen 24 8 - 25 mg/dL    Creatinine 3.90 (H) 0.40 - 1.60 mg/dL    eGFR 12 (L) >60 mL/min/1.73m*2    Calcium 8.7 8.5 - 10.4 mg/dL    Albumin 2.1 (L) 3.5 - 5.0 g/dL    Alkaline Phosphatase 113 35 - 125 U/L    Total Protein 5.5 (L) 5.9 - 7.9 g/dL    AST 19 5 - 40 U/L    Bilirubin, Total 1.0 0.1 - 1.2 mg/dL    ALT 11 5 - 40 U/L    Anion Gap 13 <=19 mmol/L   Phosphorus   Result Value Ref Range    Phosphorus 4.2 2.5 - 4.5 mg/dL   Morphology   Result Value Ref Range    RBC Morphology See Below     Target Cells Few     Ovalocytes Few     [place] Cells Few    POCT GLUCOSE   Result Value Ref Range    POCT Glucose 200 (H) 74 - 99 mg/dL   POCT GLUCOSE   Result Value Ref Range    POCT Glucose 156 (H) 74 - 99 mg/dL    CT chest abdomen pelvis wo IV contrast    Result Date: 1/21/2024  Interpreted By:  Maria Garcia, STUDY: CT CHEST ABDOMEN PELVIS WO CONTRAST;  1/20/2024 11:42 pm   INDICATION: Mental status change. Elevated white blood cell count with infectious workup.   COMPARISON: 08/20/2023   ACCESSION NUMBER(S): [id number]   ORDERING CLINICIAN: BAN GIFFORD   TECHNIQUE: CT of the chest, abdomen and pelvis was performed with no oral or intravenous contrast administered. Sagittal and coronal reformations were completed by the technologist at the acquisition scanner.   All CT examinations are performed with 1 or more of the following dose reduction techniques: Automated exposure control, adjustment of mA and/or kv according to patient's size, or use of iterative reconstruction techniques.   FINDINGS: Please note that the  study is limited without intravenous contrast.   CHEST: Bilateral pleural effusions are present with right effusion moderately small in size and with the left effusion moderately small in size as well. There is shift of the heart and mediastinum to the left of midline due to atelectasis of the left upper lobe. There is consolidation throughout left lower lobe with air bronchograms noted. On the right, there is compressive atelectasis seen posteriorly in the right lower lobe.   There is mild reactive mediastinal adenopathy seen at this time with mediastinal lymph nodes increased in size since prior study. Several of these mediastinal nodes are at the upper limits of normal measuring 8 mm in short axis diameter.   No pericardial effusion is present. The cardiac size is normal with mitral annulus calcification.   There has been prior bilateral mastectomy with reconstruction of the left breast with implant placement. Surgical clips are visible within the left axilla. Stent grafts are visible within the left upper extremity and within the left innominate, subclavian as well as axillary veins.   A peripherally calcified 1.8 cm nodule arises from the lower pole of left thyroid lobe.   ABDOMEN:   LIVER: Unremarkable.   BILE DUCTS: No intrahepatic or extrahepatic biliary ductal dilatation is seen.   GALLBLADDER: Cholelithiasis is observed with some calcification of the gallbladder wall demonstrated as well. No gallbladder wall thickening or pericholecystic fluid is evident.   PANCREAS: Unremarkable   SPLEEN: Unremarkable   ADRENAL GLANDS: Unremarkable   KIDNEYS AND URETERS: Kidneys are small in size with extensive renal artery calcification demonstrated.   PELVIS:   BLADDER: Amos catheter is present within the decompressed urinary bladder.   REPRODUCTIVE ORGANS: Calcification of the arcuate arteries within the uterus is seen. There is no uterine enlargement or adnexal mass.   BOWEL: A rectal balloon is seen. There is no  abnormal distention of the intestinal tract.   VESSELS: There is atherosclerosis of the thoracoabdominal aorta and iliac arteries with calcification in the walls of the celiac, splenic, hepatic, and mesenteric arteries.   PERITONEUM/RETROPERITONEUM/LYMPH NODES: There is a minimal amount of ascites seen about the liver and spleen with mild presacral edema noted.   ABDOMINAL WALL: There is anasarca involving the soft tissues of the abdomen and pelvis. Postoperative change from ventral hernia repair is seen.   BONES: Bilateral sacroiliitis is seen. There is lumbar dextroscoliosis. Chronic rotator cuff tear is present bilaterally with osteoarthritis of both shoulders, right greater than left.       Chest 1.  Moderately small bilateral pleural effusions with left upper lobe atelectasis and compressive atelectasis seen posteriorly in right lower lobe. A left lower lobe pneumonia is identified. There is extensive airspace consolidation within left lower lobe with air bronchograms observed. Mild mediastinal reactive adenopathy is present as well.   Abdomen-Pelvis 1.  Cholelithiasis without evidence of cholecystitis. 2. Small amount of ascites with mild presacral edema and anasarca within the soft tissues of the abdominal wall. 3. Renal atrophy with extensive vascular calcifications.     MACRO: None   Signed by: Maria Garcia 1/21/2024 8:30 AM Dictation workstation:   [id number]    CT head wo IV contrast    Result Date: 1/21/2024  Interpreted By:  Maria Garcia, STUDY: CT HEAD WO IV CONTRAST 1/20/2024 11:42 pm   INDICATION: Signs/Symptoms:mental status changes   COMPARISON: 12/11/2023   ACCESSION NUMBER(S): [id number]   ORDERING CLINICIAN: BAN GIFFORD   TECHNIQUE: Unenhanced axial images of the brain are completed.   All CT examinations are performed with 1 or more of the following dose reduction techniques: Automated exposure control, adjustment of mA and/or kv according to patient's size, or use of iterative reconstruction  techniques.   FINDINGS: Helical unenhanced axial images of the brain demonstrate a mild to moderate degree of ventricular enlargement with proportionate widening of the sulci and sylvian fissures. There is no midline shift, mass effect, extra-axial fluid collection, or acute intracranial hemorrhage. There is diminished density seen in the periventricular white matter indicating chronic microvascular ischemic disease. There is calcified plaque seen within the distal vertebral and internal carotid arteries bilaterally. No calvarial abnormality is seen.       Atrophy and chronic microvascular ischemic disease without acute intracranial process.   Signed by: Maria Garcia 1/21/2024 8:09 AM Dictation workstation:   [id number]    XR chest 1 view    Result Date: 1/20/2024  Interpreted By:  Brendon Perez, STUDY: XR CHEST 1 VIEW; 1/20/2024 5:03 pm   INDICATION: CLINICAL INFORMATION: Signs/Symptoms:Insertion right IJ central line, also left thoracentesis.   COMPARISON: 01/19/2024 at 1338 hours   ACCESSION NUMBER(S): [id number]   ORDERING CLINICIAN: BOZENA ANTONIO   TECHNIQUE: Portable chest one view.   FINDINGS: The cardiac size is indeterminate in view of the AP projection. There is no change in the right-sided dual port central venous catheter. There is a new right internal jugular venous catheter present with the tip at approximately same level as the dual port central venous catheter, overlying the mid to lower right atrium. There is no evidence for pneumothorax. Patient has a history of left thoracentesis without evidence for pneumothorax on the left. There is bilateral basilar alveolar infiltrate and effusions. Left effusion is decreased compared to the study from 1 day earlier.   Surgical clips are identified within left chest wall. Endovascular stent is identified in the distribution of the left subclavian artery.       1. Status post right-sided central venous catheter placement as described above with the tip  overlying the mid to caudal aspect of the right atrium. There is no evidence for pneumothorax. 2. No evidence for left-sided pneumothorax after left-sided thoracentesis. Decrease in the left effusion. 3. Bilateral basilar infiltrates and effusions are present. Follow-up to assure complete clearing is suggested.   MACRO: none   Signed by: Brendon Perez 1/20/2024 5:11 PM Dictation workstation:   [id number]    XR chest 1 view    Result Date: 1/19/2024  Interpreted By:  Real Mendieta, STUDY: XR CHEST 1 VIEW; 1/19/2024 1:38 pm   INDICATION: Signs/Symptoms:dyspnea.   COMPARISON: 12/26/2023   ACCESSION NUMBER(S): [id number]   ORDERING CLINICIAN: EMELY GOLDSMITH   TECHNIQUE: 1 view of the chest was performed.   FINDINGS: There may be a minimal right pleural effusion. Slight prominence of the interstitium otherwise the right lung is clear. Improved appearance of the left lung with improved aeration of the right upper lobe. There is opacification of the left lower lobe possibly due to large pleural effusion which is similar to the prior study. No pneumothorax. Right-sided dual lumen central line catheter with tip at the proximal atrium. The cardiomediastinal silhouette is within normal limits. Left-sided subclavian stents are again noted.       Improved aeration and appearance of the left lung superiorly however there is a persistent large left pleural effusion and opacification of the left lower lobe.   Signed by: Real Mendieta 1/19/2024 1:44 PM Dictation workstation:   [id number]        Assessment/Plan   IMP:    Acute respiratory failure  -bedside thora L side 1/20, 1L out, cultures/cytology pending  -6L currently  -CT chest with persistent effusions, atelectasis, reactive LAD, possible infiltrate  Sepsis/septic shock  -increased levophed requirements  -MRSA Bacteremia Hx with vegetation, on suppressive doxycycline  -ID has patient on Zosyn, Doxy, PO Vanco for cdiff  UTI-culture pending  Decubitus Ulcers-overall  worsened appearance, suspect vascular component, concern for possible OM. Very poor surgical candidate.   DHF-no echo since 10/2023  Pleural Effusion-recurrent, await interpretation  Malnutrition-Prealbumin <3  ESRD on HD  -nephrology managing, may require tablo vs CRRT due to low BP.   PAD  -CT imaging suggestion widespread calcifications and atherosclerosis from head to toes, now with worsened wounds to B/L LE  Palliative Care     Full code  Borderline capable  Healthcare power of  documentation in the front pocket of the chart, as of 6/2023 denis Bills is healthcare power of .      According to the notes patient was previously a DNR CCA/DNI as established by the patient's .  This CODE STATUS was revoked in favor of full code by patient and daughter both. This hospitalization.  Patient is currently on a procedure and I am unable to have lengthy goals of care discussion with the patient, and doubt that she is fully capable at this point. I did reach out to talk to the daughter, however she did not answer her phone and her voicemail box is full.  At this time patient is a full code full treatment model of care.  Will attempt to have goals of care discussion with daughter tomorrow.    1/21  SKY Ackerman at bedside, long discussion about multiple acute issues and underlying chronic disease. Per daughter, patient was poor function prior to 12/23 hospitalization, was only able to stand and pivot. She enjoys time with her granddaughters and her goal is to experience a belated fabiana with her family.     Patient not capable of participating in goals of care discussion today. Requested daughter speak to me privately outside of room.     Spoke to both daughters, discussed overall guarded prognosis. Likely to have very poor outcome with cpr/intubation given her multiple comorbidites, critical status. Both daughters do not want the code status to change at this time as patient was supposedly  capable when she revoked the DNRCCA/DNI. Meka and Laura both would like the code status to remain full code for now. I did recommend DNRCCA/DNI based on patient goal of returning home and being cogitively with it enough to spend time with grandchildren. Ideally family would like patient eventually be able to hold her own goals of care discussion.     I spent 60 minutes in the professional and overall care of this patient.      Jazmin Sanchez, APRN-CNP

## 2024-01-22 NOTE — PROGRESS NOTES
Vancomycin Dosing by Pharmacy- INITIAL (HEMODIALYSIS)    Ayanna Gross is a 73 y.o. year old female who Pharmacy has been consulted for vancomycin dosing for Vancomycin Indications: Skin & Soft Tissue. Based on the patient's indication and renal status this patient will be dosed based on a Pre-HD level of 20-25 mcg/mL.     Patient is currently on hemodialysis.    Initial Loading Dosing:has already been given a loading dose of 1750 mg on 1/19 at 2300    Vancomycin maintenance dose: 750 mg after each dialysis session User Schedule (TBD). Currently getting dialysis on 1/22 with next anticipated session to be 1/24.    Visit Vitals  BP 85/65   Pulse 94   Temp 34.3 °C (93.7 °F) (Temporal)   Resp 14           Lab Results   Component Value Date    CREATININE 4.20 (H) 01/22/2024    CREATININE 3.90 (H) 01/21/2024    CREATININE 3.40 (H) 01/20/2024    CREATININE 2.90 (H) 01/19/2024       I/O last 3 completed shifts:  In: 1385.5 (18.3 mL/kg) [I.V.:1235.5 (16.3 mL/kg); IV Piggyback:150]  Out: 400 (5.3 mL/kg) [Stool:400]  Weight: 75.8 kg     Assessment/Plan     Pre-HD level will be obtained on 1/24 at AM labs. May be obtained sooner if clinically indicated.   Will continue to monitor renal function daily while on vancomycin and order serum creatinine at least every 48 hours if not already ordered.  Follow for continued vancomycin needs, clinical response, and signs/symptoms of toxicity.     Kendall Alfonso, FaithD

## 2024-01-22 NOTE — CONSULTS
"Nutrition Assessement Note    Nutrition Assessment    Reason for Assessment: Provider consult order    Reason for Hospital Admission:  Ayanna Gross is a 73 y.o. female who is admitted for anemia, fever worsening sacral and lower extremity wounds and concerns from the nursing facility for left pneumonia. Pre-albumin < 3.     Nutrition History:   N/A         Anthropometrics:  Ht: 157.5 cm (5' 2\"), Wt: 75.8 kg (167 lb 1.7 oz), BMI: 30.56  IBW/kg (Dietitian Calculated): 50 kg  Percent of IBW: 151.6 %  Adjusted Body Weight (kg): 56.36 kg    Weight Change:  Weight History / % Weight Change: Per chart, Pt has lost 23# (12%) over 2 months.           Daily Weight  01/22/24 : 75.8 kg (167 lb 1.7 oz)  01/05/24 : 72.8 kg (160 lb 7.9 oz)  11/19/23 : 72.6 kg (160 lb)  01/19/23 : 80.2 kg (176 lb 12.9 oz)  11/07/22 : 86.2 kg (190 lb)  07/21/22 : 80.2 kg (176 lb 12.9 oz)  05/26/22 : 77.1 kg (170 lb)  04/04/22 : 86.2 kg (190 lb)  06/29/21 : 87.9 kg (193 lb 12.6 oz)  05/11/21 : 81.6 kg (180 lb)      Nutrition Focused Physical Exam Findings:             Edema  Edema: +2 mild, +3 moderate  Edema Location: +2 BLE, +3 BUE         Nutrition Significant Labs:  Lab Results   Component Value Date    WBC 21.2 (H) 01/22/2024    HGB 12.0 01/22/2024    HCT 37.5 01/22/2024     01/22/2024    CHOL 104 (L) 07/01/2023    TRIG 155 (H) 07/01/2023    HDL 30 (L) 07/01/2023    ALT 10 01/22/2024    AST 17 01/22/2024     (L) 01/22/2024    K 3.6 01/22/2024    CL 92 (L) 01/22/2024    CREATININE 4.20 (H) 01/22/2024    BUN 27 (H) 01/22/2024    CO2 21 (L) 01/22/2024    TSH 1.39 01/02/2024    INR 1.3 (H) 01/19/2024    HGBA1C 8.8 (H) 06/25/2023    ALBUR 1,315 (H) 02/24/2019       Current Facility-Administered Medications:     acetaminophen (Tylenol) oral liquid 1,000 mg, 1,000 mg, oral, TID, Jazmin Sanchez, APRN-CNP, 1,000 mg at 01/22/24 1112    albumin human 25 % solution 12.5 g, 12.5 g, intravenous, q1h, Saeed Mondragon MD, Last Rate: 100 " mL/hr at 01/22/24 1448, 12.5 g at 01/22/24 1448    apixaban (Eliquis) tablet 2.5 mg, 2.5 mg, oral, BID, Judd Wallssino, DO, 2.5 mg at 01/22/24 1024    dextrose 10 % in water (D10W) infusion, 0.3 g/kg/hr, intravenous, Once PRN, Judd Wallssinsowmya DO    dextrose 50 % injection 25 g, 25 g, intravenous, q15 min PRN, Judd Tavera DO    doxycycline (Vibramycin) in dextrose 5 % in water (D5W) 100 mL  mg, 100 mg, intravenous, q24h, Yusuf De La Garza PA-C, Stopped at 01/21/24 1521    escitalopram (Lexapro) tablet 10 mg, 10 mg, oral, Nightly, Judd Tavera DO, 10 mg at 01/21/24 2046    [Held by provider] gabapentin (Neurontin) capsule 100 mg, 100 mg, oral, BID, Judd Tavera DO, 100 mg at 01/19/24 2127    glucagon (Glucagen) injection 1 mg, 1 mg, intramuscular, q15 min PRN, Judd Tavera DO    heparin 1,000 unit/mL injection 2,000 Units, 2,000 Units, intra-catheter, After Dialysis, Saeed Mondragon MD    heparin 1,000 unit/mL injection 2,000 Units, 2,000 Units, intra-catheter, After Dialysis, Saeed Mondragon MD    insulin lispro (HumaLOG) injection 0-10 Units, 0-10 Units, subcutaneous, TID with meals, Judd Tavera DO, 2 Units at 01/21/24 1620    ipratropium-albuteroL (Duo-Neb) 0.5-2.5 mg/3 mL nebulizer solution 3 mL, 3 mL, nebulization, q6h, Yusuf De La Garza PA-C    midodrine (Proamatine) tablet 15 mg, 15 mg, oral, TID with meals, Judd Tavera DO, 15 mg at 01/22/24 1256    norepinephrine (Levophed) 8 mg in dextrose 5% 250 mL (0.032 mg/mL) infusion (premix), 0.01-3 mcg/kg/min, intravenous, Continuous, Yusuf De La Garza PA-C, Last Rate: 54.3 mL/hr at 01/22/24 1448, 0.39 mcg/kg/min at 01/22/24 1448    nystatin (Mycostatin) 100,000 unit/gram powder, , Topical, BID, Judd Tavera DO, Given at 01/22/24 1026    nystatin (Mycostatin) cream, , Topical, BID, MADHURI Blair-CNP, Given at 01/22/24 1033    oxyCODONE-acetaminophen (Percocet) 5-325 mg per tablet 1  tablet, 1 tablet, oral, q8h PRN, Judd Tavera DO, 1 tablet at 01/19/24 2130    oxygen (O2) therapy, , inhalation, Continuous PRN - O2/gases, Judd Tavera DO, 5 L/min at 01/22/24 1230    pantoprazole (ProtoNix) EC tablet 40 mg, 40 mg, oral, Daily, 40 mg at 01/22/24 1022 **OR** pantoprazole (ProtoNix) injection 40 mg, 40 mg, intravenous, Daily, Yusuf De La Garza PA-C, 40 mg at 01/21/24 0824    perflutren lipid microspheres (Definity) injection 0.5-10 mL of dilution, 0.5-10 mL of dilution, intravenous, Once in imaging, LILLIE Mccray    perflutren protein A microsphere (Optison) injection 0.5 mL, 0.5 mL, intravenous, Once in imaging, LILLIE Mccray    piperacillin-tazobactam-dextrose (Zosyn) IV 2.25 g, 2.25 g, intravenous, q8h, Judd Tavera DO, Stopped at 01/22/24 1327    sennosides-docusate sodium (Judy-Colace) 8.6-50 mg per tablet 1 tablet, 1 tablet, oral, Nightly PRN, Yusuf De La Garza PA-C    sodium chloride 3 % nebulizer solution 3 mL, 3 mL, nebulization, q6h GULSHAN, Yusuf De La Garza PA-C    sulfur hexafluoride microsphr (Lumason) injection 24.28 mg, 2 mL, intravenous, Once in imaging, LILLIE Mccray    vancomycin (Vancocin) capsule 125 mg, 125 mg, oral, 4x daily, Judd Tavera DO, 125 mg at 01/22/24 1256    vancomycin (Vancocin) placeholder, , miscellaneous, Daily PRN, Kendall Alfonso, Camilla    vancomycin-diluent combo no.1 (Xellia) IVPB 750 mg, 750 mg, intravenous, Once, Kendall Alfonso, Camilla    zinc oxide 20 % ointment 1 Application, 1 Application, Topical, BID, LILLIE Blair, 1 Application at 01/22/24 1033    Dietary Orders (From admission, onward)       Start     Ordered    01/22/24 1503  Oral nutritional supplements  Until discontinued        Comments: Any flavor   Question Answer Comment   Deliver with All meals    Select supplement: Sugar Free Mighty Shake        01/22/24 1503    01/22/24 1353  Oral nutritional supplements  Until  discontinued        Question Answer Comment   Deliver with Breakfast    Deliver with Dinner    Select supplement: Christian        01/22/24 1353    01/22/24 1149  Adult diet Renal; Potassium Restricted 2 gm (50mEq); 2 - 3 grams Sodium; Phosphorus 1 gm; Easy to chew; Potassium restricted 2 gm (50mEq); 1:1 Feeding  Diet effective now        Comments: Compensatory Swallowing Strategies:                                                                                                                                                                                                                                                           Upright 90 degrees as possible for all oral intake, single sips/bites, slow rate eating/feeding, alternate liquids and solids, upright after meals   Question Answer Comment   Diet type Renal    Potassium restriction: Potassium Restricted 2 gm (50mEq)    Sodium restriction: 2 - 3 grams Sodium    Phosphorus: Phosphorus 1 gm    Texture Easy to chew    Other restriction(s): Potassium restricted 2 gm (50mEq)    Select tray type: 1:1 Feeding        01/22/24 1150                     Estimated Needs:   Estimated Energy Needs  Total Energy Estimated Needs (kCal):  (3263-8385)  Total Estimated Energy Need per Day (kCal/kg):  (30-35)  Method for Estimating Needs: ABW    Estimated Protein Needs  Total Protein Estimated Needs (g):  (68-85)  Total Protein Estimated Needs (g/kg):  (1.2-1.5)  Method for Estimating Needs: ABW    Estimated Fluid Needs  Total Fluid Estimated Needs (mL):  (UO + 500 mL)  Method for Estimating Needs: HD and multiple wounds        Nutrition Diagnosis   Nutrition Diagnosis:       Nutrition Diagnosis  Patient has Nutrition Diagnosis: Yes  Diagnosis Status (1): New  Nutrition Diagnosis 1: Increased nutrient needs  Related to (1): Increased demand for nutrient  As Evidenced by (1): HD       Nutrition Interventions/Recommendations   Nutrition Interventions and Recommendations:    Nutrition  Prescription:  Individualized Nutrition Prescription Provided for : [id number] kcals, 68-85 gm protein via Renal diet    Nutrition Interventions:   Food and/or Nutrient Delivery Interventions  Interventions: Meals and snacks, Medical food supplement  Meals and Snacks: Mineral-modified diet  Goal: Provide diet as ordered  Medical Food Supplement: Commercial beverage  Goal: Will provide Mighty shake TID to increase PO intake and Christian BID to aid in wound healing. Mighty shake provides 200 kcals, 7 gm protein per serving. Christian provides 90 kcals, 2.5 gm protein per serving.    Education Documentation  No documentation found.           Nutrition Monitoring and Evaluation   Monitoring/Evaluation:   Food/Nutrient Related History Monitoring  Monitoring and Evaluation Plan: Energy intake  Energy Intake: Estimated energy intake  Criteria: Pt to consume >/= 75% of estimated needs            Time Spent/Follow-up:   Follow Up  Time Spent (min): 20 minutes  Last Date of Nutrition Visit: 01/22/24  Nutrition Follow-Up Needed?: 3-5 days  Follow up Comment: 1/26/24

## 2024-01-22 NOTE — PROGRESS NOTES
Ayanna Gross is a 73 y.o. female on day 3 of admission presenting with Septic shock (CMS/HCC).    Subjective   Interval History:   Afebrile  Awake, alert  Brother present  Remains on pressors    Review of Systems   All other systems reviewed and are negative.      Objective   Range of Vitals (last 24 hours)  Heart Rate:  [85-99]   Temp:  [34.3 °C (93.7 °F)-35.9 °C (96.6 °F)]   Resp:  [11-24]   Weight:  [75.8 kg (167 lb 1.7 oz)]   SpO2:  [84 %-99 %]   Daily Weight  01/22/24 : 75.8 kg (167 lb 1.7 oz)    Body mass index is 30.56 kg/m².    Physical Exam  Constitutional:       Appearance: Normal appearance.   HENT:      Head: Normocephalic and atraumatic.      Nose: Nose normal.   Eyes:      Extraocular Movements: Extraocular movements intact.      Conjunctiva/sclera: Conjunctivae normal.   Cardiovascular:      Heart sounds: Normal heart sounds, S1 normal and S2 normal.   Pulmonary:      Breath sounds: Decreased breath sounds present.   Abdominal:      General: Bowel sounds are normal.      Palpations: Abdomen is soft.   Musculoskeletal:      Cervical back: Normal range of motion and neck supple.   Skin:     Comments: Stage III sacral ulcer, bilateral posterior heel eschar   Neurological:      Mental Status: She is alert.     Antibiotics  aspirin tablet 325 mg  acetaminophen (Tylenol) tablet 650 mg  cefepime (Maxipime) 1 g in dextrose 5 % 50 mL IV  sodium chloride 0.9 % bolus 2,229 mL  apixaban (Eliquis) tablet 2.5 mg  escitalopram (Lexapro) tablet 10 mg  febuxostat (Uloric) tablet 40 mg  fenofibrate (Triglide) tablet 160 mg  gabapentin (Neurontin) capsule 100 mg  midodrine (Proamatine) tablet 15 mg  nystatin (Mycostatin) 100,000 unit/gram powder  oxyCODONE-acetaminophen (Percocet) 5-325 mg per tablet 1 tablet  simvastatin (Zocor) tablet 20 mg  piperacillin-tazobactam-dextrose (Zosyn) IV 2.25 g  vancomycin (Vancocin) capsule 125 mg  oxygen (O2) therapy  dextrose 50 % injection 25 g  glucagon (Glucagen) injection 1  mg  dextrose 10 % in water (D10W) infusion  insulin lispro (HumaLOG) injection 0-10 Units  nystatin (Mycostatin) 100,000 unit/gram powder 1 Application  vancomycin-diluent combo no.1 (Xellia) IVPB 1,750 mg  piperacillin-tazobactam-dextrose (Zosyn) IV 2.25 g  sodium chloride 0.9 % bolus 500 mL  norepinephrine (Levophed) 8 mg in dextrose 5% 250 mL (0.032 mg/mL) infusion (premix)  vancomycin (Vancocin) placeholder  pantoprazole (ProtoNix) EC tablet 40 mg  pantoprazole (ProtoNix) injection 40 mg  sennosides-docusate sodium (Judy-Colace) 8.6-50 mg per tablet 1 tablet  doxycycline (Vibramycin) in dextrose 5 % in water (D5W) 100 mL  mg  LORazepam (Ativan) injection 2 mg  magnesium sulfate IV 2 g  zinc oxide 20 % ointment 1 Application  nystatin (Mycostatin) cream  norepinephrine (Levophed) 8 mg in dextrose 5% 250 mL (0.032 mg/mL) infusion (premix)  heparin 1,000 unit/mL injection 2,000 Units  heparin 1,000 unit/mL injection 2,000 Units  nystatin (Mycostatin) ointment  acetaminophen (Tylenol) oral liquid 1,000 mg  albumin human 25 % solution 12.5 g  perflutren lipid microspheres (Definity) injection 0.5-10 mL of dilution  sulfur hexafluoride microsphr (Lumason) injection 24.28 mg  perflutren protein A microsphere (Optison) injection 0.5 mL  ipratropium-albuteroL (Duo-Neb) 0.5-2.5 mg/3 mL nebulizer solution 3 mL  sodium chloride 3 % nebulizer solution 3 mL  vancomycin-diluent combo no.1 (Xellia) IVPB 750 mg      Relevant Results  Labs  Results from last 72 hours   Lab Units 01/22/24  0440 01/21/24  0436 01/20/24  0449   WBC AUTO x10*3/uL 21.2* 21.0* 22.9*   HEMOGLOBIN g/dL 12.0 11.6* 11.5*   HEMATOCRIT % 37.5 36.5 37.8   PLATELETS AUTO x10*3/uL 355 424 417   NEUTROS PCT AUTO % 74.6 77.0 65.5   LYMPHS PCT AUTO % 14.6 12.9 22.6   MONOS PCT AUTO % 6.1 7.8 8.1   EOS PCT AUTO % 3.2 0.9 2.4     Results from last 72 hours   Lab Units 01/22/24  0440 01/21/24  0436 01/20/24  0449   SODIUM mmol/L 127* 133 135   POTASSIUM mmol/L  3.6 3.9 4.5   CHLORIDE mmol/L 92* 97 96*   CO2 mmol/L 21* 23* 23*   BUN mg/dL 27* 24 19   CREATININE mg/dL 4.20* 3.90* 3.40*   GLUCOSE mg/dL 159* 209* 138*   CALCIUM mg/dL 8.6 8.7 8.6   ANION GAP mmol/L 14 13 16   EGFR mL/min/1.73m*2 11* 12* 14*   PHOSPHORUS mg/dL 4.0 4.2 2.8     Results from last 72 hours   Lab Units 01/22/24  0440 01/21/24  0436 01/20/24  1750 01/20/24  0449 01/19/24  1400   ALK PHOS U/L 106 113  --   --  107   BILIRUBIN TOTAL mg/dL 0.9 1.0  --   --  1.0   PROTEIN TOTAL g/dL 5.2* 5.5* 5.4*  --  5.3*   ALT U/L 10 11  --   --  13   AST U/L 17 19  --   --  30   ALBUMIN g/dL 1.9* 2.1*  --  2.5* 2.4*     Estimated Creatinine Clearance: 11.7 mL/min (A) (by C-G formula based on SCr of 4.2 mg/dL (H)).  C-Reactive Protein   Date Value Ref Range Status   12/25/2023 5.20 (H) 0.00 - 2.00 mg/dL Final     CRP   Date Value Ref Range Status   06/23/2023 19.9 (H) 0 - 2.0 MG/DL Final     Comment:     Performed at [address] [address]   05/22/2022 1.0 0 - 2.0 MG/DL Final     Comment:     Performed at [address] [address]     Microbiology  Susceptibility data from last 14 days.  Collected Specimen Info Organism Ampicillin Cefazolin Cefazolin (uncomplicated UTIs only) Ciprofloxacin Gentamicin Piperacillin/Tazobactam Tetracycline Trimethoprim/Sulfamethoxazole   01/19/24 Urine from Indwelling (Amos) Catheter Proteus mirabilis S S S S S S R S   Reviewed  Imaging  XR tibia fibula right 2 views    Result Date: 1/22/2024  Interpreted By:  Real Mendieta, STUDY: XR TIBIA FIBULA RIGHT 2 VIEWS; 1/22/2024 2:15 pm   INDICATION: Signs/Symptoms:evaluate source of infection, osteomyelitis;   COMPARISON: None available.   ACCESSION NUMBER(S): [id number]   ORDERING CLINICIAN: BAN GIFFORD   TECHNIQUE: Views: AP and Lateral of the right tibia and fibula   FINDINGS: RESULT: There is no evidence for fracture or dislocation. Tricompartmental degenerative changes of the right knee.  The tibia and fibula appear intact without bony erosion or evidence for osteomyelitis. No other bony or soft tissue abnormality is identified. No subcutaneous gas is noted.       No evidence for acute osseous abnormality. No bony erosion to suggest osteomyelitis.   Signed by: Real Mendieta 1/22/2024 3:15 PM Dictation workstation:   [id number]    XR ankle right 3+ views    Result Date: 1/22/2024  Interpreted By:  Real Mendieta, STUDY: XR ANKLE RIGHT 3+ VIEWS; 1/22/2024 2:15 pm   INDICATION: Signs/Symptoms:Evuluate source of infection, osteomyelitis;   COMPARISON: None available.   ACCESSION NUMBER(S): [id number]   ORDERING CLINICIAN: BAN GIFFORD   TECHNIQUE: Views: AP, Lateral, Oblique, right ankle   FINDINGS: RESULT: There is no evidence for fracture or dislocation. The ankle mortise is intact. Joint spaces appear adequately maintained. No bony erosion to suggest osteomyelitis. No bone lesion or soft tissue abnormality is identified. No subcutaneous gas is seen.       No evidence for acute osseous abnormality. No bony erosion to suggest osteomyelitis.   Signed by: Real Mendieta 1/22/2024 3:14 PM Dictation workstation:   [id number]    Transthoracic Echo (TTE) Complete    Result Date: 1/22/2024           [place] [address]            Phone [phone] TRANSTHORACIC ECHOCARDIOGRAM REPORT  Patient Name:      BASIA Gray Physician:   [id number] [facility] Study Date:        1/22/2024           Ordering Provider:   [id number] ERI BERMAN MRN/PID:           [id number]            Fellow: Accession#:        [id number]        Nurse: Date of Birth/Age: 1950 / 73 years Sonographer:         Tabatha Page RDCS Gender:            F                   Additional Staff: Height:            157.00 cm           Admit Date: Weight:            75.00 kg            Admission Status:    Inpatient -  Routine BSA:               1.76 m2             Department Location: [facility] ICU Blood Pressure: 88 /49 mmHg Study Type:    TRANSTHORACIC ECHO (TTE) COMPLETE Diagnosis/ICD: Chronic combined systolic (congestive) and diastolic (congestive)                heart failure (CHF)-I50.42; Sepsis, unspecified organism-A41.9 Indication:    heart failure,septic shock CPT Codes:     Echo Complete w Full [id number] Patient History: BMI:               Obese >30 Pertinent History: Sepsis, PVD, A-Fib, CVA, Cancer and HTN. breast                    prosthesis/ca, ESRD,anemia,septic shock,heart failure. Study Detail: The following Echo studies were performed: 2D, M-Mode, Doppler and               color flow. Technically challenging study due to prosthesis,               prominent lung artifact and body habitus.  PHYSICIAN INTERPRETATION: Left Ventricle: Left ventricular systolic function is normal, with an estimated ejection fraction of 70%. There are no regional wall motion abnormalities. The left ventricular cavity size is normal. Left ventricular diastolic filling was indeterminate. Left Atrium: The left atrium is moderately dilated. Right Ventricle: The right ventricle is normal in size. There is normal right ventricular global systolic function. Right Atrium: The right atrium is normal in size. Aortic Valve: The aortic valve is trileaflet. There is no evidence of aortic valve regurgitation. The peak instantaneous gradient of the aortic valve is 2.8 mmHg. Mitral Valve: The mitral valve is normal in structure. There is no evidence of mitral valve regurgitation. Tricuspid Valve: The tricuspid valve is structurally normal. There is trace tricuspid regurgitation. Pulmonic Valve: The pulmonic valve is not well visualized. The pulmonic valve regurgitation was not well visualized. Pericardium: There is no pericardial effusion noted. Aorta: The aortic root is normal.  CONCLUSIONS:  1. Left ventricular systolic function is normal with  a 70% estimated ejection fraction.  2. The left atrium is moderately dilated.  3. Left ventricular diastolic filling indeterminate. QUANTITATIVE DATA SUMMARY: 2D MEASUREMENTS:                          Normal Ranges: LAs:           4.50 cm   (2.7-4.0cm) IVSd:          0.61 cm   (0.6-1.1cm) LVPWd:         0.88 cm   (0.6-1.1cm) LVIDd:         3.66 cm   (3.9-5.9cm) LV Mass Index: 41.9 g/m2 LV SYSTOLIC FUNCTION BY 2D PLANIMETRY (MOD):                     Normal Ranges: EF-A4C View: 68.3 % (>=55%) LV DIASTOLIC FUNCTION:                     Normal Ranges: MV Peak E: 1.19 m/s (0.7-1.2 m/s) MV Peak A: 0.90 m/s (0.42-0.7 m/s) E/A Ratio: 1.32     (1.0-2.2) MITRAL VALVE:                 Normal Ranges: MV DT: 192 msec (150-240msec) AORTIC VALVE:                         Normal Ranges: AoV Vmax:      0.84 m/s (<=1.7m/s) AoV Peak P.8 mmHg (<20mmHg) LVOT Max Shimon:  0.47 m/s (<=1.1m/s) LVOT Diameter: 1.90 cm  (1.8-2.4cm) AoV Area,Vmax: 1.57 cm2 (2.5-4.5cm2) TRICUSPID VALVE/RVSP:                   Normal Ranges: IVC Diam: 1.05 cm  [id number] [facility] DENNIS Electronically signed on 2024 at 2:53:04 PM  ** Final **     ECG 12 lead    Result Date: 2024  Sinus tachycardia Right bundle branch block Septal infarct (cited on or before 06-DEC-2023) Possible Lateral infarct , age undetermined Abnormal ECG When compared with ECG of 06-DEC-2023 13:47, Borderline criteria for Lateral infarct are now Present Questionable change in initial forces of Septal leads Nonspecific T wave abnormality, improved in Lateral leads    XR foot left 3+ views    Result Date: 2024  Interpreted By:  Real Mendieta, STUDY: XR FOOT LEFT 1-2 VIEWS; 2024 4:15 pm   INDICATION: Signs/Symptoms:osteomyelitis. Pain   COMPARISON: 2023   ACCESSION NUMBER(S): [id number]   ORDERING CLINICIAN: BAN GIFFORD   TECHNIQUE: Views:  AP, Lat, Oblique, left foot   FINDINGS: RESULT: There is no evidence for fracture or dislocation. Prior amputation of the  distal phalanx of the hallux is again noted. Mild hammertoe deformities. Joint spaces appear adequately maintained. Soft tissue ulceration of the posterior aspect of the heel however no evidence for bony erosion to suggest osteomyelitis.       No evidence for acute fracture or acute bony erosion to suggest osteomyelitis. Chronic changes as described.   Signed by: Real Mendieta 1/21/2024 7:15 PM Dictation workstation:   [id number]    CT chest abdomen pelvis wo IV contrast    Result Date: 1/21/2024  Interpreted By:  Maria Garcia, STUDY: CT CHEST ABDOMEN PELVIS WO CONTRAST;  1/20/2024 11:42 pm   INDICATION: Mental status change. Elevated white blood cell count with infectious workup.   COMPARISON: 08/20/2023   ACCESSION NUMBER(S): [id number]   ORDERING CLINICIAN: BAN GIFFORD   TECHNIQUE: CT of the chest, abdomen and pelvis was performed with no oral or intravenous contrast administered. Sagittal and coronal reformations were completed by the technologist at the acquisition scanner.   All CT examinations are performed with 1 or more of the following dose reduction techniques: Automated exposure control, adjustment of mA and/or kv according to patient's size, or use of iterative reconstruction techniques.   FINDINGS: Please note that the study is limited without intravenous contrast.   CHEST: Bilateral pleural effusions are present with right effusion moderately small in size and with the left effusion moderately small in size as well. There is shift of the heart and mediastinum to the left of midline due to atelectasis of the left upper lobe. There is consolidation throughout left lower lobe with air bronchograms noted. On the right, there is compressive atelectasis seen posteriorly in the right lower lobe.   There is mild reactive mediastinal adenopathy seen at this time with mediastinal lymph nodes increased in size since prior study. Several of these mediastinal nodes are at the upper limits of normal measuring  8 mm in short axis diameter.   No pericardial effusion is present. The cardiac size is normal with mitral annulus calcification.   There has been prior bilateral mastectomy with reconstruction of the left breast with implant placement. Surgical clips are visible within the left axilla. Stent grafts are visible within the left upper extremity and within the left innominate, subclavian as well as axillary veins.   A peripherally calcified 1.8 cm nodule arises from the lower pole of left thyroid lobe.   ABDOMEN:   LIVER: Unremarkable.   BILE DUCTS: No intrahepatic or extrahepatic biliary ductal dilatation is seen.   GALLBLADDER: Cholelithiasis is observed with some calcification of the gallbladder wall demonstrated as well. No gallbladder wall thickening or pericholecystic fluid is evident.   PANCREAS: Unremarkable   SPLEEN: Unremarkable   ADRENAL GLANDS: Unremarkable   KIDNEYS AND URETERS: Kidneys are small in size with extensive renal artery calcification demonstrated.   PELVIS:   BLADDER: Amos catheter is present within the decompressed urinary bladder.   REPRODUCTIVE ORGANS: Calcification of the arcuate arteries within the uterus is seen. There is no uterine enlargement or adnexal mass.   BOWEL: A rectal balloon is seen. There is no abnormal distention of the intestinal tract.   VESSELS: There is atherosclerosis of the thoracoabdominal aorta and iliac arteries with calcification in the walls of the celiac, splenic, hepatic, and mesenteric arteries.   PERITONEUM/RETROPERITONEUM/LYMPH NODES: There is a minimal amount of ascites seen about the liver and spleen with mild presacral edema noted.   ABDOMINAL WALL: There is anasarca involving the soft tissues of the abdomen and pelvis. Postoperative change from ventral hernia repair is seen.   BONES: Bilateral sacroiliitis is seen. There is lumbar dextroscoliosis. Chronic rotator cuff tear is present bilaterally with osteoarthritis of both shoulders, right greater than  left.       Chest 1.  Moderately small bilateral pleural effusions with left upper lobe atelectasis and compressive atelectasis seen posteriorly in right lower lobe. A left lower lobe pneumonia is identified. There is extensive airspace consolidation within left lower lobe with air bronchograms observed. Mild mediastinal reactive adenopathy is present as well.   Abdomen-Pelvis 1.  Cholelithiasis without evidence of cholecystitis. 2. Small amount of ascites with mild presacral edema and anasarca within the soft tissues of the abdominal wall. 3. Renal atrophy with extensive vascular calcifications.     MACRO: None   Signed by: Maria Garcia 1/21/2024 8:30 AM Dictation workstation:   [id number]    CT head wo IV contrast    Result Date: 1/21/2024  Interpreted By:  Maria Garcia, STUDY: CT HEAD WO IV CONTRAST 1/20/2024 11:42 pm   INDICATION: Signs/Symptoms:mental status changes   COMPARISON: 12/11/2023   ACCESSION NUMBER(S): [id number]   ORDERING CLINICIAN: BAN GIFFORD   TECHNIQUE: Unenhanced axial images of the brain are completed.   All CT examinations are performed with 1 or more of the following dose reduction techniques: Automated exposure control, adjustment of mA and/or kv according to patient's size, or use of iterative reconstruction techniques.   FINDINGS: Helical unenhanced axial images of the brain demonstrate a mild to moderate degree of ventricular enlargement with proportionate widening of the sulci and sylvian fissures. There is no midline shift, mass effect, extra-axial fluid collection, or acute intracranial hemorrhage. There is diminished density seen in the periventricular white matter indicating chronic microvascular ischemic disease. There is calcified plaque seen within the distal vertebral and internal carotid arteries bilaterally. No calvarial abnormality is seen.       Atrophy and chronic microvascular ischemic disease without acute intracranial process.   Signed by: Maria Garcia 1/21/2024 8:09 AM  Dictation workstation:   [id number]    XR chest 1 view    Result Date: 1/20/2024  Interpreted By:  Brendon Perez, STUDY: XR CHEST 1 VIEW; 1/20/2024 5:03 pm   INDICATION: CLINICAL INFORMATION: Signs/Symptoms:Insertion right IJ central line, also left thoracentesis.   COMPARISON: 01/19/2024 at 1338 hours   ACCESSION NUMBER(S): [id number]   ORDERING CLINICIAN: BOZENA ANTONIO   TECHNIQUE: Portable chest one view.   FINDINGS: The cardiac size is indeterminate in view of the AP projection. There is no change in the right-sided dual port central venous catheter. There is a new right internal jugular venous catheter present with the tip at approximately same level as the dual port central venous catheter, overlying the mid to lower right atrium. There is no evidence for pneumothorax. Patient has a history of left thoracentesis without evidence for pneumothorax on the left. There is bilateral basilar alveolar infiltrate and effusions. Left effusion is decreased compared to the study from 1 day earlier.   Surgical clips are identified within left chest wall. Endovascular stent is identified in the distribution of the left subclavian artery.       1. Status post right-sided central venous catheter placement as described above with the tip overlying the mid to caudal aspect of the right atrium. There is no evidence for pneumothorax. 2. No evidence for left-sided pneumothorax after left-sided thoracentesis. Decrease in the left effusion. 3. Bilateral basilar infiltrates and effusions are present. Follow-up to assure complete clearing is suggested.   MACRO: none   Signed by: Brendon Perez 1/20/2024 5:11 PM Dictation workstation:   [id number]    XR chest 1 view    Result Date: 1/19/2024  Interpreted By:  Real Mendieta, STUDY: XR CHEST 1 VIEW; 1/19/2024 1:38 pm   INDICATION: Signs/Symptoms:dyspnea.   COMPARISON: 12/26/2023   ACCESSION NUMBER(S): [id number]   ORDERING CLINICIAN: EMELY GOLDSMITH   TECHNIQUE: 1 view of the  chest was performed.   FINDINGS: There may be a minimal right pleural effusion. Slight prominence of the interstitium otherwise the right lung is clear. Improved appearance of the left lung with improved aeration of the right upper lobe. There is opacification of the left lower lobe possibly due to large pleural effusion which is similar to the prior study. No pneumothorax. Right-sided dual lumen central line catheter with tip at the proximal atrium. The cardiomediastinal silhouette is within normal limits. Left-sided subclavian stents are again noted.       Improved aeration and appearance of the left lung superiorly however there is a persistent large left pleural effusion and opacification of the left lower lobe.   Signed by: Real Mendieta 1/19/2024 1:44 PM Dictation workstation:   [id number]    Electrocardiogram, 12-lead PRN ACS symptoms    Result Date: 1/7/2024  Sinus rhythm Right bundle branch block Septal infarct (cited on or before 06-DEC-2023) Abnormal ECG When compared with ECG of 06-DEC-2023 13:06, Questionable change in initial forces of Septal leads Confirmed by Herminio Lemus ([id number]) on 1/7/2024 11:04:01 AM    XR chest 1 view    Result Date: 12/26/2023  Interpreted By:  Rikki Patel, STUDY: XR CHEST 1 VIEW; 12/26/2023 12:23 pm   INDICATION: Signs/Symptoms:hypoxia   COMPARISON: 12/19/2023   ACCESSION NUMBER(S): [id number]   ORDERING CLINICIAN: BRITTANI TOLEDO   FINDINGS: The study is limited due to rotation. Dialysis catheter is stable in position. The cardiac silhouette is indeterminate due to the technique. There is interval significant worsening of large left effusion and left lung atelectasis/infiltrates, with complete opacification of the left hemithorax. There is no pneumothorax. Vascular stents are noted in subclavian vessels. Surgical staples are again seen in the left axilla. The osseous structures are unchanged.       Limited study. Interval worsening of large left pleural effusion and  left lung infiltrates/atelectasis, with complete opacification of the left hemithorax.   Signed by: Rikki Patel 12/26/2023 12:40 PM Dictation workstation:   [id number]     Assessment/Plan   Septic shock  Left-sided pleural effusion sedated  Left lower lobe pneumonia  Proteus urinary tract infection  History of MRSA endocarditis  History of C. 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

## 2024-01-22 NOTE — PROGRESS NOTES
Ayanna Gross is a 73 y.o. female on day 3 of admission presenting with Septic shock (CMS/HCC).    Subjective   Symptoms (0 - 10, Best to Worst)  [place] Symptom Assessment System  Pain Score: 0 - No pain  Mental status continues to wax and wane. Remains on levophed, SBP 70's at baseline. Afebrile. Currently on 6L, spo2 97%. Co sacral pain today.        Objective     Vitals and nursing note reviewed.   Constitutional:       General: She is not in acute distress.     Appearance: She is ill-appearing.      Comments: Chronically ill-appearing, lying in bed in ICU   HENT:      Head: Normocephalic and atraumatic.      Nose: Nose normal.      Mouth/Throat:      Mouth: Mucous membranes are moist.      Pharynx: Oropharynx is clear.   Eyes:      Extraocular Movements: Extraocular movements intact.      Pupils: Pupils are equal, round, and reactive to light.   Cardiovascular:      Rate and Rhythm: Normal rate and regular rhythm.      Pulses: Normal pulses.      Heart sounds: No murmur heard.  Pulmonary:      Effort: Pulmonary effort is normal. No respiratory distress.      Comments: Diminished lung bases  Nasal cannula 6L  Abdominal:      General: Abdomen is flat. Bowel sounds are normal. There is no distension.      Palpations: Abdomen is soft.      Tenderness: There is no abdominal tenderness.   Musculoskeletal:         General: No swelling, tenderness, deformity or signs of injury. Normal range of motion.      Cervical back: Normal range of motion and neck supple.   Skin:     General: Skin is warm and dry.      Capillary Refill: Capillary refill takes 2 to 3 seconds.      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   Neurological:      General: No focal deficit present.      Mental Status: She is alert and oriented to person, place, and time.      Motor: Weakness present.      Comments: Slowed  "responses, somewhat somnolent waxing and waning mental status   Psychiatric:         Mood and Affect: Mood normal.        Last Recorded Vitals  Blood pressure 85/65, pulse 96, temperature 35.2 °C (95.4 °F), temperature source Temporal, resp. rate 17, height 1.575 m (5' 2\"), weight 75.8 kg (167 lb 1.7 oz), SpO2 98 %.  Intake/Output last 3 Shifts:  I/O last 3 completed shifts:  In: 1385.5 (18.3 mL/kg) [I.V.:1235.5 (16.3 mL/kg); IV Piggyback:150]  Out: 400 (5.3 mL/kg) [Stool:400]  Weight: 75.8 kg     Relevant Results  Results for orders placed or performed during the hospital encounter of 01/19/24 (from the past 24 hour(s))   POCT GLUCOSE   Result Value Ref Range    POCT Glucose 174 (H) 74 - 99 mg/dL   POCT GLUCOSE   Result Value Ref Range    POCT Glucose 170 (H) 74 - 99 mg/dL   POCT GLUCOSE   Result Value Ref Range    POCT Glucose 181 (H) 74 - 99 mg/dL   C. difficile, PCR    Specimen: Stool   Result Value Ref Range    C. difficile, PCR Not Detected Not Detected   CBC and Auto Differential   Result Value Ref Range    WBC 21.2 (H) 4.4 - 11.3 x10*3/uL    nRBC 0.2 (H) 0.0 - 0.0 /100 WBCs    RBC 4.11 4.00 - 5.20 x10*6/uL    Hemoglobin 12.0 12.0 - 16.0 g/dL    Hematocrit 37.5 36.0 - 46.0 %    MCV 91 80 - 100 fL    MCH 29.2 26.0 - 34.0 pg    MCHC 32.0 32.0 - 36.0 g/dL    RDW 24.4 (H) 11.5 - 14.5 %    Platelets 355 150 - 450 x10*3/uL    Neutrophils % 74.6 40.0 - 80.0 %    Immature Granulocytes %, Automated 1.1 (H) 0.0 - 0.9 %    Lymphocytes % 14.6 13.0 - 44.0 %    Monocytes % 6.1 2.0 - 10.0 %    Eosinophils % 3.2 0.0 - 6.0 %    Basophils % 0.4 0.0 - 2.0 %    Neutrophils Absolute 15.81 (H) 1.60 - 5.50 x10*3/uL    Immature Granulocytes Absolute, Automated 0.23 0.00 - 0.50 x10*3/uL    Lymphocytes Absolute 3.09 (H) 0.80 - 3.00 x10*3/uL    Monocytes Absolute 1.29 (H) 0.05 - 0.80 x10*3/uL    Eosinophils Absolute 0.68 (H) 0.00 - 0.40 x10*3/uL    Basophils Absolute 0.09 0.00 - 0.10 x10*3/uL   Comprehensive metabolic panel   Result " Value Ref Range    Glucose 159 (H) 65 - 99 mg/dL    Sodium 127 (L) 133 - 145 mmol/L    Potassium 3.6 3.4 - 5.1 mmol/L    Chloride 92 (L) 97 - 107 mmol/L    Bicarbonate 21 (L) 24 - 31 mmol/L    Urea Nitrogen 27 (H) 8 - 25 mg/dL    Creatinine 4.20 (H) 0.40 - 1.60 mg/dL    eGFR 11 (L) >60 mL/min/1.73m*2    Calcium 8.6 8.5 - 10.4 mg/dL    Albumin 1.9 (L) 3.5 - 5.0 g/dL    Alkaline Phosphatase 106 35 - 125 U/L    Total Protein 5.2 (L) 5.9 - 7.9 g/dL    AST 17 5 - 40 U/L    Bilirubin, Total 0.9 0.1 - 1.2 mg/dL    ALT 10 5 - 40 U/L    Anion Gap 14 <=19 mmol/L   Magnesium   Result Value Ref Range    Magnesium 1.90 1.60 - 3.10 mg/dL   Phosphorus   Result Value Ref Range    Phosphorus 4.0 2.5 - 4.5 mg/dL   Morphology   Result Value Ref Range    RBC Morphology See Below     Target Cells Few     Ovalocytes Few     [place] Cells Few    POCT GLUCOSE   Result Value Ref Range    POCT Glucose 109 (H) 74 - 99 mg/dL    CT chest abdomen pelvis wo IV contrast    Result Date: 1/21/2024  Interpreted By:  Maria Garcia, STUDY: CT CHEST ABDOMEN PELVIS WO CONTRAST;  1/20/2024 11:42 pm   INDICATION: Mental status change. Elevated white blood cell count with infectious workup.   COMPARISON: 08/20/2023   ACCESSION NUMBER(S): [id number]   ORDERING CLINICIAN: BAN GIFFORD   TECHNIQUE: CT of the chest, abdomen and pelvis was performed with no oral or intravenous contrast administered. Sagittal and coronal reformations were completed by the technologist at the acquisition scanner.   All CT examinations are performed with 1 or more of the following dose reduction techniques: Automated exposure control, adjustment of mA and/or kv according to patient's size, or use of iterative reconstruction techniques.   FINDINGS: Please note that the study is limited without intravenous contrast.   CHEST: Bilateral pleural effusions are present with right effusion moderately small in size and with the left effusion moderately small in size as well. There is shift of  the heart and mediastinum to the left of midline due to atelectasis of the left upper lobe. There is consolidation throughout left lower lobe with air bronchograms noted. On the right, there is compressive atelectasis seen posteriorly in the right lower lobe.   There is mild reactive mediastinal adenopathy seen at this time with mediastinal lymph nodes increased in size since prior study. Several of these mediastinal nodes are at the upper limits of normal measuring 8 mm in short axis diameter.   No pericardial effusion is present. The cardiac size is normal with mitral annulus calcification.   There has been prior bilateral mastectomy with reconstruction of the left breast with implant placement. Surgical clips are visible within the left axilla. Stent grafts are visible within the left upper extremity and within the left innominate, subclavian as well as axillary veins.   A peripherally calcified 1.8 cm nodule arises from the lower pole of left thyroid lobe.   ABDOMEN:   LIVER: Unremarkable.   BILE DUCTS: No intrahepatic or extrahepatic biliary ductal dilatation is seen.   GALLBLADDER: Cholelithiasis is observed with some calcification of the gallbladder wall demonstrated as well. No gallbladder wall thickening or pericholecystic fluid is evident.   PANCREAS: Unremarkable   SPLEEN: Unremarkable   ADRENAL GLANDS: Unremarkable   KIDNEYS AND URETERS: Kidneys are small in size with extensive renal artery calcification demonstrated.   PELVIS:   BLADDER: Amos catheter is present within the decompressed urinary bladder.   REPRODUCTIVE ORGANS: Calcification of the arcuate arteries within the uterus is seen. There is no uterine enlargement or adnexal mass.   BOWEL: A rectal balloon is seen. There is no abnormal distention of the intestinal tract.   VESSELS: There is atherosclerosis of the thoracoabdominal aorta and iliac arteries with calcification in the walls of the celiac, splenic, hepatic, and mesenteric arteries.    PERITONEUM/RETROPERITONEUM/LYMPH NODES: There is a minimal amount of ascites seen about the liver and spleen with mild presacral edema noted.   ABDOMINAL WALL: There is anasarca involving the soft tissues of the abdomen and pelvis. Postoperative change from ventral hernia repair is seen.   BONES: Bilateral sacroiliitis is seen. There is lumbar dextroscoliosis. Chronic rotator cuff tear is present bilaterally with osteoarthritis of both shoulders, right greater than left.       Chest 1.  Moderately small bilateral pleural effusions with left upper lobe atelectasis and compressive atelectasis seen posteriorly in right lower lobe. A left lower lobe pneumonia is identified. There is extensive airspace consolidation within left lower lobe with air bronchograms observed. Mild mediastinal reactive adenopathy is present as well.   Abdomen-Pelvis 1.  Cholelithiasis without evidence of cholecystitis. 2. Small amount of ascites with mild presacral edema and anasarca within the soft tissues of the abdominal wall. 3. Renal atrophy with extensive vascular calcifications.     MACRO: None   Signed by: Maria Garcia 1/21/2024 8:30 AM Dictation workstation:   [id number]    CT head wo IV contrast    Result Date: 1/21/2024  Interpreted By:  Maria Garcia, STUDY: CT HEAD WO IV CONTRAST 1/20/2024 11:42 pm   INDICATION: Signs/Symptoms:mental status changes   COMPARISON: 12/11/2023   ACCESSION NUMBER(S): [id number]   ORDERING CLINICIAN: BAN GIFFORD   TECHNIQUE: Unenhanced axial images of the brain are completed.   All CT examinations are performed with 1 or more of the following dose reduction techniques: Automated exposure control, adjustment of mA and/or kv according to patient's size, or use of iterative reconstruction techniques.   FINDINGS: Helical unenhanced axial images of the brain demonstrate a mild to moderate degree of ventricular enlargement with proportionate widening of the sulci and sylvian fissures. There is no midline  shift, mass effect, extra-axial fluid collection, or acute intracranial hemorrhage. There is diminished density seen in the periventricular white matter indicating chronic microvascular ischemic disease. There is calcified plaque seen within the distal vertebral and internal carotid arteries bilaterally. No calvarial abnormality is seen.       Atrophy and chronic microvascular ischemic disease without acute intracranial process.   Signed by: Maria Garcia 1/21/2024 8:09 AM Dictation workstation:   [id number]    XR chest 1 view    Result Date: 1/20/2024  Interpreted By:  Brendon Perez, STUDY: XR CHEST 1 VIEW; 1/20/2024 5:03 pm   INDICATION: CLINICAL INFORMATION: Signs/Symptoms:Insertion right IJ central line, also left thoracentesis.   COMPARISON: 01/19/2024 at 1338 hours   ACCESSION NUMBER(S): [id number]   ORDERING CLINICIAN: BOZENA ANTONIO   TECHNIQUE: Portable chest one view.   FINDINGS: The cardiac size is indeterminate in view of the AP projection. There is no change in the right-sided dual port central venous catheter. There is a new right internal jugular venous catheter present with the tip at approximately same level as the dual port central venous catheter, overlying the mid to lower right atrium. There is no evidence for pneumothorax. Patient has a history of left thoracentesis without evidence for pneumothorax on the left. There is bilateral basilar alveolar infiltrate and effusions. Left effusion is decreased compared to the study from 1 day earlier.   Surgical clips are identified within left chest wall. Endovascular stent is identified in the distribution of the left subclavian artery.       1. Status post right-sided central venous catheter placement as described above with the tip overlying the mid to caudal aspect of the right atrium. There is no evidence for pneumothorax. 2. No evidence for left-sided pneumothorax after left-sided thoracentesis. Decrease in the left effusion. 3. Bilateral basilar  infiltrates and effusions are present. Follow-up to assure complete clearing is suggested.   MACRO: none   Signed by: Brendon Perez 1/20/2024 5:11 PM Dictation workstation:   [id number]    XR chest 1 view    Result Date: 1/19/2024  Interpreted By:  Real Mendieta, STUDY: XR CHEST 1 VIEW; 1/19/2024 1:38 pm   INDICATION: Signs/Symptoms:dyspnea.   COMPARISON: 12/26/2023   ACCESSION NUMBER(S): [id number]   ORDERING CLINICIAN: EMELY GOLDSMITH   TECHNIQUE: 1 view of the chest was performed.   FINDINGS: There may be a minimal right pleural effusion. Slight prominence of the interstitium otherwise the right lung is clear. Improved appearance of the left lung with improved aeration of the right upper lobe. There is opacification of the left lower lobe possibly due to large pleural effusion which is similar to the prior study. No pneumothorax. Right-sided dual lumen central line catheter with tip at the proximal atrium. The cardiomediastinal silhouette is within normal limits. Left-sided subclavian stents are again noted.       Improved aeration and appearance of the left lung superiorly however there is a persistent large left pleural effusion and opacification of the left lower lobe.   Signed by: Real Mendieta 1/19/2024 1:44 PM Dictation workstation:   [id number]        Assessment/Plan   IMP:    Acute respiratory failure  -bedside thora L side 1/20, 1L out, cultures/cytology pending  -6L currently  -CT chest with persistent effusions, atelectasis, reactive LAD, possible infiltrate  Sepsis/septic shock  -increased levophed requirements  -MRSA Bacteremia Hx with vegetation, on suppressive doxycycline  -ID has patient on Zosyn, Doxy, PO Vanco for cdiff  UTI-culture pending  Decubitus Ulcers-overall worsened appearance, suspect vascular component, concern for possible OM. Very poor surgical candidate.   DHF-no echo since 10/2023  Pleural Effusion-recurrent, await interpretation  Malnutrition-Prealbumin <3  ESRD on  HD  -nephrology managing, may require tablo vs CRRT due to low BP.   PAD  -CT imaging suggestion widespread calcifications and atherosclerosis from head to toes, now with worsened wounds to B/L LE  Palliative Care     Full code  Borderline capable  Healthcare power of  documentation in the front pocket of the chart, as of 6/2023 daughter Meka Bills is healthcare power of .      According to the notes patient was previously a DNR CCA/DNI as established by the patient's .  This CODE STATUS was revoked in favor of full code by patient and daughter both. This hospitalization.  Patient is currently on a procedure and I am unable to have lengthy goals of care discussion with the patient, and doubt that she is fully capable at this point. I did reach out to talk to the daughter, however she did not answer her phone and her voicemail box is full.  At this time patient is a full code full treatment model of care.  Will attempt to have goals of care discussion with daughter tomorrow.    1/21  SKY Ackerman at bedside, long discussion about multiple acute issues and underlying chronic disease. Per daughter, patient was poor function prior to 12/23 hospitalization, was only able to stand and pivot. She enjoys time with her granddaughters and her goal is to experience a belated fabiana with her family.     Patient not capable of participating in goals of care discussion today. Requested daughter speak to me privately outside of room.     Spoke to both daughters, discussed overall guarded prognosis. Likely to have very poor outcome with cpr/intubation given her multiple comorbidites, critical status. Both daughters do not want the code status to change at this time as patient was supposedly capable when she revoked the DNRCCA/DNI. Meka and Laura both would like the code status to remain full code for now. I did recommend DNRCCA/DNI based on patient goal of returning home and being cogitively with it  enough to spend time with grandchildren. Ideally family would like patient eventually be able to hold her own goals of care discussion.     1/22  Reviewed case with nephrology, who is familiar with patient and has a good relationship with patient/family. Typical SBP 70's and asymptomatic even outpatient. Plan for dialysis today, hopefully mentation will improve after Tablo. WBC remains high, core temp low today, starting lawrence hugger. Remains on levophed, titration goal changed by nephrology for SBP 70. Daughter at bedside, provided updates. Added routine tylenol for sacral discomfort.     I spent 30 minutes in the professional and overall care of this patient.      Jazmin Sanchez, APRN-CNP

## 2024-01-22 NOTE — PROGRESS NOTES
"Ayanna Gross is a 73 y.o. female on day 3 of admission presenting with Septic shock (CMS/HCC).    Subjective   On Levophed. For Tablo today. C/o \"butt\" pain.    Objective     Physical Exam  Constitutional:       Appearance: She is ill-appearing.   HENT:      Head: Normocephalic and atraumatic.   Cardiovascular:      Rate and Rhythm: Normal rate.   Pulmonary:      Effort: Pulmonary effort is normal.   Abdominal:      General: Abdomen is flat. Bowel sounds are normal.   Skin:     General: Skin is warm and dry.      Findings: Rash present.      Comments: Heel wounds with eschar. Sacral rash red and present to b/l buttocks and sacrum.    Neurological:      Mental Status: She is alert.      Motor: Weakness present.         Last Recorded Vitals  Blood pressure 85/65, pulse 92, temperature 34.6 °C (94.3 °F), temperature source Axillary, resp. rate 15, height 1.575 m (5' 2\"), weight 75.8 kg (167 lb 1.7 oz), SpO2 97 %.  Intake/Output last 3 Shifts:  I/O last 3 completed shifts:  In: 1385.5 (18.3 mL/kg) [I.V.:1235.5 (16.3 mL/kg); IV Piggyback:150]  Out: 400 (5.3 mL/kg) [Stool:400]  Weight: 75.8 kg     Relevant Results  Lab Results   Component Value Date    WBC 21.2 (H) 01/22/2024    HGB 12.0 01/22/2024    HCT 37.5 01/22/2024    MCV 91 01/22/2024     01/22/2024     Lab Results   Component Value Date    GLUCOSE 159 (H) 01/22/2024    CALCIUM 8.6 01/22/2024     (L) 01/22/2024    K 3.6 01/22/2024    CO2 21 (L) 01/22/2024    CL 92 (L) 01/22/2024    BUN 27 (H) 01/22/2024    CREATININE 4.20 (H) 01/22/2024     === 01/19/24 ===    XR FOOT 3+ VIEWS LEFT    - Impression -  No evidence for acute fracture or acute bony erosion to suggest  osteomyelitis. Chronic changes as described.    Signed by: Real Mendieta 1/21/2024 7:15 PM  Dictation workstation:   [id number]      Assessment/Plan   Principal Problem:    Septic shock (CMS/HCC)  Active Problems:    End stage renal disease (CMS/HCC)    Anemia due to blood loss, " acute    Heel wounds to be evaluated by podiatry for possible debridement. For sacral dermatitis, clean with soap and water, dry completely and apply mixture of zinc oxide and nystatin.        I spent 15 minutes in the professional and overall care of this patient.      Nickolas Gallego, MADHURI-CNP

## 2024-01-22 NOTE — CARE PLAN
Problem: Pain  Goal: My pain/discomfort is manageable  Outcome: Progressing  Flowsheets (Taken 1/21/2024 2323)  Resident's pain/discomfort is manageable:   Include resident/family/caregiver in decisions related to pain management   Offer non-pharmacological pain management interventions   Identify and avoid pain triggers   Administer pain medication prior to activities that may trigger pain     Problem: Safety  Goal: Patient will be injury free during hospitalization  Outcome: Progressing  Goal: I will remain free of falls  Outcome: Progressing  Flowsheets (Taken 1/21/2024 2323)  Resident will remain free of falls:   Utilize fall response kit   Apply bed/chair alarms as appropriate   Assist with toileting as orderd   Maintain bed at position as ordered (chair height, low bed)   Consider camera monitoring   Assess and monitor medications that may increase fall risk   Consult with physical therapy as needed   Consider transfer to room close to nurses' station   Visual checks per facility policy   Accompany resident as ordered (ex. 1:1, stand-by assist, dayroom monitoring, 15 minute checks, line of sight)   Use gait belt for all transfers     Problem: Daily Care  Goal: Daily care needs are met  Outcome: Progressing  Flowsheets (Taken 1/21/2024 2323)  Daily care needs are met:   Assess and monitor ability to perform self care and identify potential discharge needs   Assist patient with activities of daily living as needed   Provide mouth care   Include patient/family/caregiver in decisions related to daily care   Encourage independent activity per ability   Assess skin integrity/risk for skin breakdown     Problem: Psychosocial Needs  Goal: Demonstrates ability to cope with hospitalization/illness  Outcome: Progressing  Goal: Collaborate with me, my family, and caregiver to identify my specific goals  Outcome: Progressing     Problem: Discharge Barriers  Goal: My discharge needs are met  Outcome:  Progressing  Flowsheets (Taken 1/21/2024 6193)  Resident's discharge needs are met:   Identify potential discharge barriers on admission and throughout stay   Involve resident/family/caregiver in discharge planning process     Problem: Fall/Injury  Goal: Not fall by end of shift  Outcome: Progressing  Goal: Be free from injury by end of the shift  Outcome: Progressing  Goal: Verbalize understanding of personal risk factors for fall in the hospital  Outcome: Progressing  Goal: Verbalize understanding of risk factor reduction measures to prevent injury from fall in the home  Outcome: Progressing  Goal: Use assistive devices by end of the shift  Outcome: Progressing  Goal: Pace activities to prevent fatigue by end of the shift  Outcome: Progressing     Problem: Skin  Goal: Decreased wound size/increased tissue granulation at next dressing change  Outcome: Progressing  Flowsheets (Taken 1/21/2024 0719 by Nan Kern RN)  Decreased wound size/increased tissue granulation at next dressing change:   Promote sleep for wound healing   Protective dressings over bony prominences  Goal: Participates in plan/prevention/treatment measures  Outcome: Progressing  Goal: Prevent/manage excess moisture  Outcome: Progressing  Goal: Prevent/minimize sheer/friction injuries  Outcome: Progressing  Flowsheets (Taken 1/20/2024 0425 by Isabella Miles RN)  Prevent/minimize sheer/friction injuries:   Complete micro-shifts as needed if patient unable. Adjust patient position to relieve pressure points, not a full turn   Increase activity/out of bed for meals   Use pull sheet   Utilize specialty bed per algorithm   Turn/reposition every 2 hours/use positioning/transfer devices   HOB 30 degrees or less  Goal: Promote/optimize nutrition  Outcome: Progressing  Flowsheets (Taken 1/20/2024 0425 by Isabella Miles RN)  Promote/optimize nutrition:   Assist with feeding   Monitor/record intake including meals   Consume > 50% meals/supplements   Offer  water/supplements/favorite foods   Reassess MST if dietician not consulted   Discuss with provider if NPO > 2 days  Goal: Promote skin healing  Outcome: Progressing  Flowsheets (Taken 1/20/2024 0425 by Isabella Miles RN)  Promote skin healing:   Protective dressings over bony prominences   Assess skin/pad under line(s)/device(s)   Turn/reposition every 2 hours/use positioning/transfer devices   Ensure correct size (line/device) and apply per  instructions   Rotate device position/do not position patient on device     Problem: Pain  Goal: Takes deep breaths with improved pain control throughout the shift  Outcome: Progressing  Goal: Turns in bed with improved pain control throughout the shift  Outcome: Progressing  Goal: Walks with improved pain control throughout the shift  Outcome: Progressing  Goal: Performs ADL's with improved pain control throughout shift  Outcome: Progressing  Goal: Participates in PT with improved pain control throughout the shift  Outcome: Progressing  Goal: Free from opioid side effects throughout the shift  Outcome: Progressing  Goal: Free from acute confusion related to pain meds throughout the shift  Outcome: Progressing     Problem: Respiratory  Goal: Clear secretions with interventions this shift  Outcome: Progressing  Goal: Minimize anxiety/maximize coping throughout shift  Outcome: Progressing  Goal: Minimal/no exertional discomfort or dyspnea this shift  Outcome: Progressing  Goal: No signs of respiratory distress (eg. Use of accessory muscles. Peds grunting)  Outcome: Progressing  Goal: Patent airway maintained this shift  Outcome: Progressing  Goal: Tolerate mechanical ventilation evidenced by VS/agitation level this shift  Outcome: Progressing  Goal: Tolerate pulmonary toileting this shift  Outcome: Progressing  Goal: Verbalize decreased shortness of breath this shift  Outcome: Progressing  Goal: Wean oxygen to maintain O2 saturation per order/standard this  shift  Outcome: Progressing  Goal: Increase self care and/or family involvement in next 24 hours  Outcome: Progressing   The patient's goals for the shift include      The clinical goals for the shift include pt will remain safe from injury throughout shift

## 2024-01-22 NOTE — PROGRESS NOTES
[facility] Critical Care Medicine       Date:  1/22/2024  Patient:  Ayanna Gross  YOB: 1950  MRN:  [id number]   Admit Date:  1/19/2024  ========================================================================================================    Chief Complaint   Patient presents with    Shortness of Breath     Patient with SOB and fever. Is very lethargic. Dialysis MWF but did not go today dt SOB and fever         History of Present Illness:  Ayanna Gross is a 73 y.o. female presenting with anemia, fever worsening sacral and lower extremity wounds and concerns from the nursing facility for left pneumonia. Patient presents to the emergency department found to be febrile she was hypotensive but responded to 500 cc fluid bolus she was found to have a hemoglobin of 6 she was begun and ordered for a single unit of blood transfusion will actually provide 2 units of packed cells additional IV fluids to complete her volume resuscitation given her fever clinical evidence of hypovolemia and infection with an elevated lactate of 2.3 she received a total of approximately 1800 mL of fluid she is DNR/DNI by advanced directives but ICU level care is acceptable her granddaughter questions disorder but it was confirmed and signed by her  who is her POA.  She will be admitted to the intensive care unit given her frail compromised state and high risk of morbidity and mortality necessitating IV antibiotics fluids blood transfusion and ultimately will receive dialysis likely tomorrow wound care consultation to general surgery has been requested as well as consultation with the intensivist infectious disease and nephrology wounds are cultured C. difficile is requested she remains on oral vancomycin for suppression given recent C. difficile infection in December she is initiated on pharmacy dosing of vancomycin and Zosyn 2.25 g IV every 6 hours. She continues on Eliquis at this time for DVT prophylaxis as well as  atrial fibrillation.     Further Hx: Ayanna Gross 73-year-old female with past medical history of atrial fibrillation, end-stage renal disease, C. difficile infection, moderate sized recurrent left pleural effusions, and MRSA bacteremia with mitral valve vegetation admitted to ICU for management of septic shock.  On morning exam patient was on 0.22 of levo just to maintain a MAP of 60 and SBP of 88.  Last documented echocardiogram from [facility] shows EF between 55 to 60% with grade 2 left ventricular diastolic dysfunction, and moderate circumferential pericardial effusion.  Unable to find adequate documentation of who treated her endocarditis, but there was documentation that she was currently still receiving treatment when she was admitted to [facility]. On further chart review Dr. Coulter is her infectious disease doctor. She also had 2 thoracentesis's for moderate to large sided left pleural effusions with cytology showing no malignancy, but unable to differentiate between exudative and transudative at this time.      Interval ICU Events:  1/20: Pt missed last dialysis appointment, no signs of urgent need today, but nephrology Dr. Cabello is following. Pt on high requirements of levophed. She is on broad spectrum abx for coverage currently ID Dr. Coulter following. Pt switches from being A&O x 3 and lethargy. Goal today is to get more central access, david, and thoracentesis.      1/21: Pt more lethargic this AM requiring uptitration of her Levophed to 0.28. Diagnostic and therapeutic thoracentesis yesterday over 1L of hazy straw color fluid. Currently still on vanc, zosyn, and doxy for abx. Potential dialysis this today, but no urgency.      1/22: Pt come in and out of lethargic states throughout the day. Still on high doses of levophed this morning. Per nephrologist Dr. Cabello titrate for an SBP goal of 70 and mentation. Left Pleural fluid analysis resulted as transudative per  lights criteria. Tablo today per nephrology. ID managing with empiric coverage. Source control may not have been met. Podiatry, wound, and surgery to evaluate multiple wounds over the body.     Medical History:  Past Medical History:   Diagnosis Date    Asthma     CAD (coronary artery disease)     CHF (congestive heart failure) (CMS/Formerly [facility])     Chronic kidney disease on chronic dialysis (CMS/Formerly [facility])     Hypertension     Personal history of diseases of the blood and blood-forming organs and certain disorders involving the immune mechanism     History of autoimmune disorder    Sleep apnea     Type 2 diabetes mellitus without complications (CMS/Formerly [facility]) 09/15/2022    Diabetes mellitus     Past Surgical History:   Procedure Laterality Date    CARPAL TUNNEL RELEASE  11/18/2013    Neuroplasty Decompression Median Nerve At Carpal Tunnel    HAND SURGERY  11/18/2013    Hand Surgery                                                                                                                                                          MASTECTOMY, PARTIAL  04/10/2014    Right Breast Partial Mastectomy    MR HEAD ANGIO WO IV CONTRAST  8/29/2023    MR HEAD ANGIO WO IV CONTRAST [facility]    OTHER SURGICAL HISTORY  11/18/2013    Breast Reconstruction With Implant Prosthesis    OTHER SURGICAL HISTORY  11/18/2013    Thyroid Surgery Substernal Thyroidectomy Partial    OTHER SURGICAL HISTORY  11/18/2013    Modified Radical Mastectomy Left Breast    OTHER SURGICAL HISTORY  04/04/2022    Carpal tunnel surgery    OTHER SURGICAL HISTORY  04/04/2022    Foot surgery    OTHER SURGICAL HISTORY  04/04/2022    Hernia repair    SENTINEL LYMPH NODE BIOPSY  04/10/2014    [place] Lymph Node Biopsy    TONSILLECTOMY  11/18/2013    Tonsillectomy    UMBILICAL HERNIA REPAIR  11/18/2013    Umbilical Hernia Repair    US GUIDED PERCUTANEOUS PLACEMENT  6/29/2023    US GUIDED PERCUTANEOUS PLACEMENT [facility]     Medications Prior to Admission    Medication Sig Dispense Refill Last Dose    apixaban (Eliquis) 2.5 mg tablet Take 1 tablet (2.5 mg) by mouth 2 times a day.       blood sugar diagnostic strip 1 x daily e11.65 for 90 days       blood-glucose sensor (Dexcom G6 Sensor) device Check blood sugar when needed and as instructed 6 each 3     doxycycline (Vibramycin) 100 mg capsule Take 1 capsule (100 mg) by mouth once daily. Take with at least 8 ounces (large glass) of water, do not lie down for 30 minutes after Do not start before January 6, 2024.       escitalopram (Lexapro) 10 mg tablet 1 tablet Orally Once a day for 30 day(s)       febuxostat (Uloric) 40 mg tablet TAKE ONE TABLET BY MOUTH EVERY OTHER DAY       fenofibrate (Triglide) 160 mg tablet Take 1 tablet (160 mg) by mouth once daily.       gabapentin (Neurontin) 100 mg capsule Take 1 capsule (100 mg) by mouth 2 times a day.       insulin lispro (HumaLOG) 100 unit/mL injection Inject 0-0.1 mL (0-10 Units) under the skin 3 times a day with meals. Take as directed per insulin instructions.       midodrine (Proamatine) 5 mg tablet Take 3 tablets (15 mg) by mouth 3 times a day with meals.       nystatin (Mycostatin) 100,000 unit/gram powder APPLY 1 GRAM TO SKIN TWO TIMES A DAY 30 g 0     oxyCODONE-acetaminophen (Percocet) 5-325 mg tablet Take 1 tablet by mouth every 12 hours if needed for severe pain (7 - 10). 120 tablet 0     simvastatin (Zocor) 20 mg tablet TAKE 1 TABLET BY MOUTH DAILY 60 tablet 5      Patient has no known allergies.  Social History     Tobacco Use    Smoking status: Never    Smokeless tobacco: Never   Substance Use Topics    Alcohol use: Not Currently    Drug use: Never     Family History   Problem Relation Name Age of Onset    Lymphoma Mother      Prostate cancer Father          passed away [place] [facility] [address] [place] [address] Medications:    norepinephrine, 0.01-3 mcg/kg/min, Last Rate: 0.34 mcg/kg/min (01/22/24 1300)          Current Facility-Administered Medications:      acetaminophen (Tylenol) oral liquid 1,000 mg, 1,000 mg, oral, TID, Jazmin Sanchez, APRN-CNP, 1,000 mg at 01/22/24 1112    albumin human 25 % solution 12.5 g, 12.5 g, intravenous, q1h, Saeed Mondragon MD, Last Rate: 100 mL/hr at 01/22/24 1241, 12.5 g at 01/22/24 1241    apixaban (Eliquis) tablet 2.5 mg, 2.5 mg, oral, BID, Judd Tavera DO, 2.5 mg at 01/22/24 1024    dextrose 10 % in water (D10W) infusion, 0.3 g/kg/hr, intravenous, Once PRN, Judd Tavera DO    dextrose 50 % injection 25 g, 25 g, intravenous, q15 min PRN, Judd Tavera DO    doxycycline (Vibramycin) in dextrose 5 % in water (D5W) 100 mL  mg, 100 mg, intravenous, q24h, Yusuf De La Garza PA-C, Stopped at 01/21/24 1521    escitalopram (Lexapro) tablet 10 mg, 10 mg, oral, Nightly, Judd Tavera DO, 10 mg at 01/21/24 2046    [Held by provider] gabapentin (Neurontin) capsule 100 mg, 100 mg, oral, BID, Judd Tavera DO, 100 mg at 01/19/24 2127    glucagon (Glucagen) injection 1 mg, 1 mg, intramuscular, q15 min PRN, Judd Tavera DO    heparin 1,000 unit/mL injection 2,000 Units, 2,000 Units, intra-catheter, After Dialysis, Saeed Mondragon MD    heparin 1,000 unit/mL injection 2,000 Units, 2,000 Units, intra-catheter, After Dialysis, Saeed Mondragon MD    insulin lispro (HumaLOG) injection 0-10 Units, 0-10 Units, subcutaneous, TID with meals, Judd Tavera DO, 2 Units at 01/21/24 1620    ipratropium-albuteroL (Duo-Neb) 0.5-2.5 mg/3 mL nebulizer solution 3 mL, 3 mL, nebulization, q6h, Yusuf De La Garza PA-C    midodrine (Proamatine) tablet 15 mg, 15 mg, oral, TID with meals, Judd Tavera DO, 15 mg at 01/22/24 1256    norepinephrine (Levophed) 8 mg in dextrose 5% 250 mL (0.032 mg/mL) infusion (premix), 0.01-3 mcg/kg/min, intravenous, Continuous, Yusuf De La Garza PA-C, Last Rate: 47.4 mL/hr at 01/22/24 1300, 0.34 mcg/kg/min at 01/22/24 1300    nystatin (Mycostatin) 100,000 unit/gram powder,  , Topical, BID, Judd Tavera DO, Given at 01/22/24 1026    nystatin (Mycostatin) cream, , Topical, BID, LILLIE Blair, Given at 01/22/24 1033    oxyCODONE-acetaminophen (Percocet) 5-325 mg per tablet 1 tablet, 1 tablet, oral, q8h PRN, Judd Tavera DO, 1 tablet at 01/19/24 2130    oxygen (O2) therapy, , inhalation, Continuous PRN - O2/gases, Judd Tavera DO, 5 L/min at 01/22/24 1230    pantoprazole (ProtoNix) EC tablet 40 mg, 40 mg, oral, Daily, 40 mg at 01/22/24 1022 **OR** pantoprazole (ProtoNix) injection 40 mg, 40 mg, intravenous, Daily, Yusuf De La Garza PA-C, 40 mg at 01/21/24 0824    perflutren lipid microspheres (Definity) injection 0.5-10 mL of dilution, 0.5-10 mL of dilution, intravenous, Once in imaging, LILLIE Mccray    perflutren protein A microsphere (Optison) injection 0.5 mL, 0.5 mL, intravenous, Once in imaging, LILLIE Mccray    piperacillin-tazobactam-dextrose (Zosyn) IV 2.25 g, 2.25 g, intravenous, q8h, Judd Tavera DO, Last Rate: 100 mL/hr at 01/22/24 1257, 2.25 g at 01/22/24 1257    sennosides-docusate sodium (Judy-Colace) 8.6-50 mg per tablet 1 tablet, 1 tablet, oral, Nightly PRN, Yusuf De La Garza PA-C    sodium chloride 3 % nebulizer solution 3 mL, 3 mL, nebulization, q6h GULSHAN, Yusuf De La Garza PA-C    sulfur hexafluoride microsphr (Lumason) injection 24.28 mg, 2 mL, intravenous, Once in imaging, LILLIE Mccray    vancomycin (Vancocin) capsule 125 mg, 125 mg, oral, 4x daily, Judd Tavera DO, 125 mg at 01/22/24 1256    vancomycin (Vancocin) placeholder, , miscellaneous, Daily PRN, Kendall Alfonso PharmD    vancomycin-diluent combo no.1 (Xellia) IVPB 750 mg, 750 mg, intravenous, Once, Kendall Alfonso PharmD    zinc oxide 20 % ointment 1 Application, 1 Application, Topical, BID, Nickolas Gallego, APRN-CNP, 1 Application at 01/22/24 1033    Review of Systems:  14 point review of systems was completed and negative  except for those specially mention in my HPI    Physical Exam:    Heart Rate:  [85-99]   Temp:  [34.3 °C (93.7 °F)-36 °C (96.8 °F)]   Resp:  [11-24]   Weight:  [75.8 kg (167 lb 1.7 oz)]   SpO2:  [84 %-99 %]     Physical Exam  Vitals and nursing note reviewed.   Constitutional:       Appearance: She is overweight. She is ill-appearing.      Interventions: Nasal cannula in place.   HENT:      Nose: Nose normal. No congestion.      Mouth/Throat:      Mouth: Mucous membranes are moist.      Pharynx: Oropharynx is clear. No oropharyngeal exudate.   Eyes:      General: No scleral icterus.     Extraocular Movements: Extraocular movements intact.      Conjunctiva/sclera: Conjunctivae normal.      Pupils: Pupils are equal, round, and reactive to light.   Cardiovascular:      Rate and Rhythm: Normal rate and regular rhythm.      Pulses:           Radial pulses are 1+ on the right side and 1+ on the left side.        Popliteal pulses are 0 on the right side and 0 on the left side.      Heart sounds: No murmur heard.  Pulmonary:      Effort: Pulmonary effort is normal.   Abdominal:      Palpations: Abdomen is soft.      Tenderness: There is no abdominal tenderness. There is no guarding or rebound.   Musculoskeletal:         General: Swelling and deformity present.      Right upper arm: Swelling and edema present.      Cervical back: Normal range of motion.      Right lower leg: Edema present.      Left lower leg: Edema present.      Comments: Multiple hard nodules in/by vasculature potential for superficial thrombolithes    Skin:     General: Skin is dry.      Capillary Refill: Capillary refill takes less than 2 seconds.      Coloration: Skin is not jaundiced.      Findings: Bruising, erythema, signs of injury, lesion, rash and wound present.   Neurological:      General: No focal deficit present.      Mental Status: She is alert and oriented to person, place, and time. Mental status is at baseline.      Motor: Weakness  present.         Objective:    I have reviewed all medications, laboratory results, and imaging pertinent for today's encounter.           Intake/Output Summary (Last 24 hours) at 1/22/2024 1322  Last data filed at 1/22/2024 1300  Gross per 24 hour   Intake 1517.58 ml   Output 400 ml   Net 1117.58 ml         Assessment/Plan:     I am currently managing this critically ill patient for the following problems:  Septic Shock   ESRD   Chronic Hypotension   Recurrent Pleural Effusions   Vasculopathy   Multiple Wounds with and with out infections  C. Diff infection     Plan:  Neuro:  AMS   -Pain Control: Tylenol for mild, Percocet for moderate   -CAM Assessment, Neuro checks every shift   -Palliative consulted   -Restart Lexapro and gabapentin  -CT head No acute finding      CV:   Hx of endocarditis   Septic Shock  Chronic Hypotension  Vasculopathy   -Levophed started maintain SBP > 70  -Home midodrine 15 mg TID when mental status is appropriate   -Elevated troponins 2/2 shock state   -Continuous cardiac monitoring     Resp:  Ct chest showed multiple nodules on lung in August 23   Moderate to large left sided pleural effusion   -Thoracentesis 1/20 over 1L of hazy straw colored fluid, Lights criteria suggest Transudative    -SP02 >92%, Nasal canula titration   -CT C/A/P A left lower lobe pneumonia is identified. There is extensive airspace consolidation  -continuous pulse ox  -Pulm hygiene and Acapella      GI:   -Renal Low phos Low K assess mental status before feeding   -Flexacil for stools   -BR with senna/colace PRN   -Protonix daily      :  ESRD   -Nephrology consulted for dialysis order, High dose of pressors required, Appreciate nephrology consult   -Elevated Lactate 2/2 hypoperfusion   -Avoid Large amounts of fluids   -Daily BMP, Replete electrolytes as indicated for ESRD    -Continue Uloric for gout prophylaxis   -Possible Tablo today      Endo:  DM2   -SSI Q4h      Heme:   Anemia of chronic disease   -monitor  for s/s anemia  -transfuse for hgb <7  -daily CBC     MSK:   Multiple Wounds with and with out infections  -Surgery consulted   -Wound care consulted for dressing other wounds   -Podiatry consulted  -XR Left ankle not suspicious for osteomyelitis   -XR Right ankle and tib/fib to evaluate source of infection   -padded pressure points  -ICU skin protocol     ID:  Hx of MRSA bacteremia with endocarditis  Multiple Wounds with and with out infections  C. Diff infection  -WBC 22.9 -> 21.0  -> 21.2 Vanc + Zoysn, Possible inadequate source control, consider biopsy of wounds by ID   -Per ID add doxycycline 100 IV every 24 hours   -blood cultures No growth day 2  -urine cultures grew Proteus   -Pro-maría 2.12  -ID Consulted, Appreciate ID consult  -C. Diff & MRSA PCR negative     Ethics/Code Status:  Status was changed to Full code by daughter Meka Bills who has supplied documentation that she is medical power of       :  DVT Prophylaxis: Heparin   GI Prophylaxis: Protonix  Bowel Regimen: Senna   Diet: Renal  CVC: right internal jugular 1/20  Chandni: right radial 1/20  Amos: dc today   Restraints: None  Dispo: ICU      This note was prepared using voice recognition software. The details of this note are correct and have been reviewed, and corrected to the best of my ability. Some grammatical areas may persist related to the Dragon software.      I have reviewed all medications, laboratory results, and imaging pertinent for today's encounter.  Plan Discussed with Dr. Grossman  Critical Care Time: 55 minutes  [facility] [place] Sean De La Garza PA-C

## 2024-01-22 NOTE — NURSING NOTE
Upon rounding right chest dialysis catheter dressing loose, right neck TLC dressing soiled and loose. Right arm arterial line dressing also wet. Routine dressing changes to all per protocol. Right chest dialysis catheter site without redness, edema or bleeding noted, line is not sutured-noted no dressing and RN aware. Right internal jugular TLC site also without redness, edema or bleeding noted, all ports in use at this time. Right FA PIV removed with tip intact. Dressing change to right arm arterial line.

## 2024-01-22 NOTE — PROGRESS NOTES
CONSULT PROGRESS NOTES    SERVICE DATE: 1/22/2024   SERVICE TIME: 11:03 AM    CONSULTING SERVICE: Nephrology    ASSESSMENT AND PLAN   1.  End-stage renal disease  2.  Hypotension  3.  Hyponatremia  4.  Hypokalemia  5.  Anemia of chronic kidney disease    Dialysis today via [facility] with low temperature, no fluid removal, 4K bath.  I believe she is uremic, she has not had dialysis for about 1 week.  BUN is not an indicator of uremia in this patient with oliguria.  She is extremely malnourished with low albumin, low prealbumin, no major muscle mass.  Hyponatremia from volume overload in this patient with oliguria.  Hypokalemia, use 4K bath.  Hemoglobin is at goal.  Use IV albumin with hemodialysis today, no volume removal.  Briefly touched upon goals of care with the patient and her daughter today.  At present, would like to attempt dialysis, try to wean from pressors with a target systolic blood pressure greater than 70.  Typically, she has no major mental status changes with a blood pressure above 70.  I do feel she is uremic which would explain her delirium/encephalopathy now.  Case discussed with the ICU and palliative care teams.  Will follow her this week.  Anticipate next hemodialysis would be Wednesday depending on how she does today.  Challenging case, lots of major medical problems, profound hypotension requiring pressors.    SUBJECTIVE  INTERVAL HPI: She has hypotension requiring significant amount of norepinephrine infusion.  She seems to be mentating okay.  Her daughter is at bedside.  She is eating.  She denies any dyspnea, denies pain.  Has a rectal tube in place.  No diarrhea.    MEDICATIONS:  apixaban, 2.5 mg, oral, BID  doxycycline, 100 mg, intravenous, q24h  escitalopram, 10 mg, oral, Nightly  [Held by provider] gabapentin, 100 mg, oral, BID  insulin lispro, 0-10 Units, subcutaneous, TID with meals  midodrine, 15 mg, oral, TID with meals  nystatin, , Topical, BID  nystatin, , Topical,  BID  pantoprazole, 40 mg, oral, Daily   Or  pantoprazole, 40 mg, intravenous, Daily  piperacillin-tazobactam, 2.25 g, intravenous, q8h  vancomycin, 125 mg, oral, 4x daily  zinc oxide, 1 Application, Topical, BID       norepinephrine, 0.01-3 mcg/kg/min, Last Rate: 0.36 mcg/kg/min (01/22/24 1030)       PRN medications: dextrose 10 % in water (D10W), dextrose, glucagon, oxyCODONE-acetaminophen, oxygen, sennosides-docusate sodium, vancomycin     OBJECTIVE  PHYSICAL EXAM:   Heart Rate:  []   Temp:  [34.6 °C (94.3 °F)-35.9 °C (96.6 °F)]   Resp:  [11-24]   Weight:  [75.8 kg (167 lb 1.7 oz)]   SpO2:  [84 %-98 %]   Body mass index is 30.56 kg/m².  Chronically ill-appearing elderly white woman  Pale skin  Right-sided hand swelling  Left-sided finger amputation  Internal jugular tunneled hemodialysis catheter in place  She has some pretibial edema on both lower extremities  Soft abdomen  No Amos  No obvious joint deformities  Moist mucosa  Hearing seems to be intact  Phonation intact  Arterial line placed  Right internal jugular triple-lumen catheter in place    DATA:   Labs:  Results for orders placed or performed during the hospital encounter of 01/19/24 (from the past 96 hour(s))   Sars-CoV-2 and Influenza A/B PCR   Result Value Ref Range    Flu A Result Not Detected Not Detected    Flu B Result Not Detected Not Detected    Coronavirus 2019, PCR Not Detected Not Detected   CBC and Auto Differential   Result Value Ref Range    WBC      nRBC      RBC      Hemoglobin      Hematocrit      MCV      MCH      MCHC      RDW      Platelets      Neutrophils %      Immature Granulocytes %, Automated      Lymphocytes %      Monocytes %      Eosinophils %      Basophils %      Neutrophils Absolute      Immature Granulocytes Absolute, Automated      Lymphocytes Absolute      Monocytes Absolute      Eosinophils Absolute      Basophils Absolute     Serial Troponin, 2 Hour ([facility])   Result Value Ref Range    Troponin T, High  Sensitivity 345 (HH) <=14 ng/L   Blood Culture    Specimen: Peripheral Venipuncture; Blood culture   Result Value Ref Range    Blood Culture No growth at 2 days    Blood Culture    Specimen: Peripheral Venipuncture; Blood culture   Result Value Ref Range    Blood Culture No growth at 2 days    BLOOD GAS LACTIC ACID, VENOUS   Result Value Ref Range    POCT Lactate, Venous 2.3 (H) 0.4 - 2.0 mmol/L   Comprehensive metabolic panel   Result Value Ref Range    Glucose 130 (H) 65 - 99 mg/dL    Sodium 131 (L) 133 - 145 mmol/L    Potassium 4.2 3.4 - 5.1 mmol/L    Chloride 93 (L) 97 - 107 mmol/L    Bicarbonate 28 24 - 31 mmol/L    Urea Nitrogen 15 8 - 25 mg/dL    Creatinine 2.90 (H) 0.40 - 1.60 mg/dL    eGFR 17 (L) >60 mL/min/1.73m*2    Calcium 8.0 (L) 8.5 - 10.4 mg/dL    Albumin 2.4 (L) 3.5 - 5.0 g/dL    Alkaline Phosphatase 107 35 - 125 U/L    Total Protein 5.3 (L) 5.9 - 7.9 g/dL    AST 30 5 - 40 U/L    Bilirubin, Total 1.0 0.1 - 1.2 mg/dL    ALT 13 5 - 40 U/L    Anion Gap 10 <=19 mmol/L   Morphology   Result Value Ref Range    RBC Morphology      Polychromasia      Target Cells      Ovalocytes     ECG 12 lead   Result Value Ref Range    Ventricular Rate 106 BPM    Atrial Rate 106 BPM    KS Interval 180 ms    QRS Duration 138 ms    QT Interval 414 ms    QTC Calculation(Bazett) 549 ms    R Axis 151 degrees    T Axis 29 degrees    QRS Count 18 beats    Q Onset 192 ms    P Onset 102 ms    P Offset 156 ms    T Offset 399 ms    QTC Fredericia 500 ms   Prepare RBC: 1 Units   Result Value Ref Range    PRODUCT CODE X3809X96     Unit Number [id number]     Unit ABO O     Unit RH POS     XM INTEP COMP     Dispense Status XM     Blood Expiration Date February 16, 2024 23:59 EST     PRODUCT BLOOD TYPE 5100     UNIT VOLUME 400    Blood Gas Lactic Acid, Venous   Result Value Ref Range    POCT Lactate, Venous 1.6 0.4 - 2.0 mmol/L   Troponin T   Result Value Ref Range    Troponin T, High Sensitivity 331 (HH) <=14 ng/L   Type and screen    Result Value Ref Range    ABO TYPE O     Rh TYPE POS     ANTIBODY SCREEN NEG    Protime-INR   Result Value Ref Range    Protime 13.9 (H) 9.3 - 12.7 seconds    INR 1.3 (H) 0.9 - 1.2   Iron and TIBC   Result Value Ref Range    Iron 21 (L) 30 - 160 ug/dL    UIBC 76 (L) 110 - 370 ug/dL    TIBC 97 (L) 228 - 428 ug/dL    % Saturation 22 12 - 50 %   Magnesium   Result Value Ref Range    Magnesium 1.50 (L) 1.60 - 3.10 mg/dL   Prepare RBC: 1 Units   Result Value Ref Range    PRODUCT CODE [id number]     Unit Number [id number]     Unit ABO O     Unit RH POS     XM INTEP COMP     Dispense Status XM     Blood Expiration Date February 15, 2024 23:59 EST     PRODUCT BLOOD TYPE 5100     UNIT VOLUME 350    Urinalysis with Reflex Culture and Microscopic   Result Value Ref Range    Color, Urine Dark-Orange (N) Light-Yellow, Yellow, Dark-Yellow    Appearance, Urine Ex.Turbid (N) Clear    Specific Gravity, Urine 1.027 1.005 - 1.035    pH, Urine 6.5 5.0, 5.5, 6.0, 6.5, 7.0, 7.5, 8.0    Protein, Urine 300 (3+) (A) NEGATIVE, 10 (TRACE), 20 (TRACE) mg/dL    Glucose, Urine Normal Normal mg/dL    Blood, Urine 1.0 (3+) (A) NEGATIVE    Ketones, Urine NEGATIVE NEGATIVE mg/dL    Bilirubin, Urine NEGATIVE NEGATIVE    Urobilinogen, Urine Normal Normal mg/dL    Nitrite, Urine NEGATIVE NEGATIVE    Leukocyte Esterase, Urine 500 Sharmin/µL (A) NEGATIVE   Extra Urine Gray Tube   Result Value Ref Range    Extra Tube Hold for add-ons.    Microscopic Only, Urine   Result Value Ref Range    WBC, Urine >50 (A) 1-5, NONE /HPF    WBC Clumps, Urine MANY Reference range not established. /HPF    RBC, Urine >20 (A) NONE, 1-2, 3-5 /HPF    Bacteria, Urine 4+ (A) NONE SEEN /HPF   Urine Culture    Specimen: Indwelling (Amos) Catheter; Urine   Result Value Ref Range    Urine Culture >100,000 Gram Negative Bacilli (A)    BLOOD GAS LACTIC ACID, VENOUS   Result Value Ref Range    POCT Lactate, Venous 2.1 (H) 0.4 - 2.0 mmol/L   POCT GLUCOSE   Result Value Ref Range     POCT Glucose 145 (H) 74 - 99 mg/dL   POCT GLUCOSE   Result Value Ref Range    POCT Glucose 116 (H) 74 - 99 mg/dL   Renal Function Panel   Result Value Ref Range    Glucose 138 (H) 65 - 99 mg/dL    Sodium 135 133 - 145 mmol/L    Potassium 4.5 3.4 - 5.1 mmol/L    Chloride 96 (L) 97 - 107 mmol/L    Bicarbonate 23 (L) 24 - 31 mmol/L    Urea Nitrogen 19 8 - 25 mg/dL    Creatinine 3.40 (H) 0.40 - 1.60 mg/dL    eGFR 14 (L) >60 mL/min/1.73m*2    Calcium 8.6 8.5 - 10.4 mg/dL    Phosphorus 2.8 2.5 - 4.5 mg/dL    Albumin 2.5 (L) 3.5 - 5.0 g/dL    Anion Gap 16 <=19 mmol/L   CBC and Auto Differential   Result Value Ref Range    WBC 22.9 (H) 4.4 - 11.3 x10*3/uL    nRBC 0.0 0.0 - 0.0 /100 WBCs    RBC 3.91 (L) 4.00 - 5.20 x10*6/uL    Hemoglobin 11.5 (L) 12.0 - 16.0 g/dL    Hematocrit 37.8 36.0 - 46.0 %    MCV 97 80 - 100 fL    MCH 29.4 26.0 - 34.0 pg    MCHC 30.4 (L) 32.0 - 36.0 g/dL    RDW 24.7 (H) 11.5 - 14.5 %    Platelets 417 150 - 450 x10*3/uL    Neutrophils % 65.5 40.0 - 80.0 %    Immature Granulocytes %, Automated 0.9 0.0 - 0.9 %    Lymphocytes % 22.6 13.0 - 44.0 %    Monocytes % 8.1 2.0 - 10.0 %    Eosinophils % 2.4 0.0 - 6.0 %    Basophils % 0.5 0.0 - 2.0 %    Neutrophils Absolute 15.03 (H) 1.60 - 5.50 x10*3/uL    Immature Granulocytes Absolute, Automated 0.20 0.00 - 0.50 x10*3/uL    Lymphocytes Absolute 5.17 (H) 0.80 - 3.00 x10*3/uL    Monocytes Absolute 1.86 (H) 0.05 - 0.80 x10*3/uL    Eosinophils Absolute 0.54 (H) 0.00 - 0.40 x10*3/uL    Basophils Absolute 0.12 (H) 0.00 - 0.10 x10*3/uL   VERIFY ABO/Rh Group Test   Result Value Ref Range    ABO TYPE O     Rh TYPE POS    Morphology   Result Value Ref Range    RBC Morphology See Below     Polychromasia Mild     Target Cells Few     [place] Cells Many     Clumped Platelets Present    POCT GLUCOSE   Result Value Ref Range    POCT Glucose 163 (H) 74 - 99 mg/dL   Blood Gas Lactic Acid, Venous   Result Value Ref Range    POCT Lactate, Venous 1.8 0.4 - 2.0 mmol/L   Cortisol AM    Result Value Ref Range    Cortisol  A.M. 36.4 (H) 5.0 - 20.0 ug/dL   Prealbumin   Result Value Ref Range    Prealbumin <3.0 (L) 18.0 - 40.0 mg/dL   POCT GLUCOSE   Result Value Ref Range    POCT Glucose 141 (H) 74 - 99 mg/dL   pH, Body Fluid   Result Value Ref Range    pH, Fluid 7.20 See Below   Lactate Dehydrogenase, Fluid   Result Value Ref Range    LD, Fluid 64 Not established. U/L   Glucose, Fluid   Result Value Ref Range    Glucose, Fluid 173 Not established mg/dL   Protein, Total Fluid   Result Value Ref Range    Protein, Total Fluid 1.3 Not established g/dL   Body Fluid Cell Count   Result Value Ref Range    Color, Fluid Straw Colorless, Straw, Yellow    Clarity, Fluid Clear Clear    WBC, Fluid 67 See Comment /uL    RBC, Fluid 1,000 0  /uL /uL   Body Fluid Differential   Result Value Ref Range    Neutrophils %, Manual, Fluid 12 <25 % %    Lymphocytes %, Manual, Fluid 36 <75 % %    Mono/Macrophages %, Manual, Fluid 52 <70 % %    Eosinophils %, Manual, Fluid 0 0 % %    Basophils %, Manual, Fluid 0 0 % %    Immature Granulocytes %, Manual, Fluid 0 0 % %    Blasts %, Manual, Fluid 0 0 % %    Unclassified Cells %, Manual, Fluid 0 0 % %    Plasma Cells %, Manual, Fluid 0 0 % %    Total Cells Counted, Fluid 100    Triglycerides, Fluid   Result Value Ref Range    Triglycerides, Fluid 33 No established mg/dL   Amylase, Fluid   Result Value Ref Range    Amylase, Fluid <10 Not established. U/L   Procalcitonin   Result Value Ref Range    Procalcitonin 2.12 (H) <=0.07 ng/mL   Blood Gas Venous Full Panel   Result Value Ref Range    POCT pH, Venous 7.30 (L) 7.33 - 7.43 pH    POCT pCO2, Venous 49 41 - 51 mm Hg    POCT pO2, Venous 149 (H) 35 - 45 mm Hg    POCT SO2, Venous 100 (H) 45 - 75 %    POCT Oxy Hemoglobin, Venous 97.3 (H) 45.0 - 75.0 %    POCT Hematocrit Calculated, Venous 35.0 (L) 36.0 - 46.0 %    POCT Sodium, Venous 129 (L) 136 - 145 mmol/L    POCT Potassium, Venous 3.7 3.5 - 5.3 mmol/L    POCT Chloride, Venous 96  (L) 98 - 107 mmol/L    POCT Ionized [place], Venous 1.15 1.10 - 1.33 mmol/L    POCT Glucose, Venous 187 (H) 74 - 99 mg/dL    POCT Lactate, Venous 2.0 0.4 - 2.0 mmol/L    POCT Base Excess, Venous -2.6 (L) -2.0 - 3.0 mmol/L    POCT HCO3 Calculated, Venous 24.1 22.0 - 26.0 mmol/L    POCT Hemoglobin, Venous 11.5 (L) 12.0 - 16.0 g/dL    POCT Anion Gap, Venous 13.0 10.0 - 25.0 mmol/L    Patient Temperature 37.0 degrees Celsius    FiO2 30 %   Lactate Dehydrogenase   Result Value Ref Range    LDH     Protein, Total   Result Value Ref Range    Total Protein 5.4 (L) 5.9 - 7.9 g/dL   POCT GLUCOSE   Result Value Ref Range    POCT Glucose 129 (H) 74 - 99 mg/dL   POCT GLUCOSE   Result Value Ref Range    POCT Glucose 192 (H) 74 - 99 mg/dL   CBC and Auto Differential   Result Value Ref Range    WBC 21.0 (H) 4.4 - 11.3 x10*3/uL    nRBC 0.0 0.0 - 0.0 /100 WBCs    RBC 3.93 (L) 4.00 - 5.20 x10*6/uL    Hemoglobin 11.6 (L) 12.0 - 16.0 g/dL    Hematocrit 36.5 36.0 - 46.0 %    MCV 93 80 - 100 fL    MCH 29.5 26.0 - 34.0 pg    MCHC 31.8 (L) 32.0 - 36.0 g/dL    RDW 24.2 (H) 11.5 - 14.5 %    Platelets 424 150 - 450 x10*3/uL    Neutrophils % 77.0 40.0 - 80.0 %    Immature Granulocytes %, Automated 0.9 0.0 - 0.9 %    Lymphocytes % 12.9 13.0 - 44.0 %    Monocytes % 7.8 2.0 - 10.0 %    Eosinophils % 0.9 0.0 - 6.0 %    Basophils % 0.5 0.0 - 2.0 %    Neutrophils Absolute 16.18 (H) 1.60 - 5.50 x10*3/uL    Immature Granulocytes Absolute, Automated 0.19 0.00 - 0.50 x10*3/uL    Lymphocytes Absolute 2.71 0.80 - 3.00 x10*3/uL    Monocytes Absolute 1.63 (H) 0.05 - 0.80 x10*3/uL    Eosinophils Absolute 0.19 0.00 - 0.40 x10*3/uL    Basophils Absolute 0.10 0.00 - 0.10 x10*3/uL   Magnesium   Result Value Ref Range    Magnesium 1.50 (L) 1.60 - 3.10 mg/dL   Prealbumin   Result Value Ref Range    Prealbumin <3.0 (L) 18.0 - 40.0 mg/dL   Comprehensive metabolic panel   Result Value Ref Range    Glucose 209 (H) 65 - 99 mg/dL    Sodium 133 133 - 145 mmol/L     Potassium 3.9 3.4 - 5.1 mmol/L    Chloride 97 97 - 107 mmol/L    Bicarbonate 23 (L) 24 - 31 mmol/L    Urea Nitrogen 24 8 - 25 mg/dL    Creatinine 3.90 (H) 0.40 - 1.60 mg/dL    eGFR 12 (L) >60 mL/min/1.73m*2    Calcium 8.7 8.5 - 10.4 mg/dL    Albumin 2.1 (L) 3.5 - 5.0 g/dL    Alkaline Phosphatase 113 35 - 125 U/L    Total Protein 5.5 (L) 5.9 - 7.9 g/dL    AST 19 5 - 40 U/L    Bilirubin, Total 1.0 0.1 - 1.2 mg/dL    ALT 11 5 - 40 U/L    Anion Gap 13 <=19 mmol/L   Phosphorus   Result Value Ref Range    Phosphorus 4.2 2.5 - 4.5 mg/dL   Morphology   Result Value Ref Range    RBC Morphology See Below     Target Cells Few     Ovalocytes Few     [place] Cells Few    POCT GLUCOSE   Result Value Ref Range    POCT Glucose 200 (H) 74 - 99 mg/dL   POCT GLUCOSE   Result Value Ref Range    POCT Glucose 156 (H) 74 - 99 mg/dL   POCT GLUCOSE   Result Value Ref Range    POCT Glucose 174 (H) 74 - 99 mg/dL   POCT GLUCOSE   Result Value Ref Range    POCT Glucose 170 (H) 74 - 99 mg/dL   POCT GLUCOSE   Result Value Ref Range    POCT Glucose 181 (H) 74 - 99 mg/dL   C. difficile, PCR    Specimen: Stool   Result Value Ref Range    C. difficile, PCR Not Detected Not Detected   CBC and Auto Differential   Result Value Ref Range    WBC 21.2 (H) 4.4 - 11.3 x10*3/uL    nRBC 0.2 (H) 0.0 - 0.0 /100 WBCs    RBC 4.11 4.00 - 5.20 x10*6/uL    Hemoglobin 12.0 12.0 - 16.0 g/dL    Hematocrit 37.5 36.0 - 46.0 %    MCV 91 80 - 100 fL    MCH 29.2 26.0 - 34.0 pg    MCHC 32.0 32.0 - 36.0 g/dL    RDW 24.4 (H) 11.5 - 14.5 %    Platelets 355 150 - 450 x10*3/uL    Neutrophils % 74.6 40.0 - 80.0 %    Immature Granulocytes %, Automated 1.1 (H) 0.0 - 0.9 %    Lymphocytes % 14.6 13.0 - 44.0 %    Monocytes % 6.1 2.0 - 10.0 %    Eosinophils % 3.2 0.0 - 6.0 %    Basophils % 0.4 0.0 - 2.0 %    Neutrophils Absolute 15.81 (H) 1.60 - 5.50 x10*3/uL    Immature Granulocytes Absolute, Automated 0.23 0.00 - 0.50 x10*3/uL    Lymphocytes Absolute 3.09 (H) 0.80 - 3.00 x10*3/uL     Monocytes Absolute 1.29 (H) 0.05 - 0.80 x10*3/uL    Eosinophils Absolute 0.68 (H) 0.00 - 0.40 x10*3/uL    Basophils Absolute 0.09 0.00 - 0.10 x10*3/uL   Comprehensive metabolic panel   Result Value Ref Range    Glucose 159 (H) 65 - 99 mg/dL    Sodium 127 (L) 133 - 145 mmol/L    Potassium 3.6 3.4 - 5.1 mmol/L    Chloride 92 (L) 97 - 107 mmol/L    Bicarbonate 21 (L) 24 - 31 mmol/L    Urea Nitrogen 27 (H) 8 - 25 mg/dL    Creatinine 4.20 (H) 0.40 - 1.60 mg/dL    eGFR 11 (L) >60 mL/min/1.73m*2    Calcium 8.6 8.5 - 10.4 mg/dL    Albumin 1.9 (L) 3.5 - 5.0 g/dL    Alkaline Phosphatase 106 35 - 125 U/L    Total Protein 5.2 (L) 5.9 - 7.9 g/dL    AST 17 5 - 40 U/L    Bilirubin, Total 0.9 0.1 - 1.2 mg/dL    ALT 10 5 - 40 U/L    Anion Gap 14 <=19 mmol/L   Magnesium   Result Value Ref Range    Magnesium 1.90 1.60 - 3.10 mg/dL   Phosphorus   Result Value Ref Range    Phosphorus 4.0 2.5 - 4.5 mg/dL   Morphology   Result Value Ref Range    RBC Morphology See Below     Target Cells Few     Ovalocytes Few     [place] Cells Few    POCT GLUCOSE   Result Value Ref Range    POCT Glucose 109 (H) 74 - 99 mg/dL         SIGNATURE: Saeed Mondragon MD PATIENT NAME: Ayanna Gross   DATE: January 22, 2024 MRN: [id number]   TIME: 11:03 AM PAGER: [phone]

## 2024-01-23 NOTE — PROGRESS NOTES
TCC spoke to patient's daughter, Meka, on the phone. Assessment complete. Patient typically lives with her spouse in a house. [facility] past few months. Patient was mostly wheelchair bound and was able to transfer. More recently she has not made much progress at rehab. [facility] [place] [facility] M[facility]. Patient does not drive. Patient follows Dr. JEANNIE Conley. Patient fills prescriptions at [facility] in [place]. At this time family does not want [facility] to [facility] as it is difficult for her to transfer for dialysis. Family thinking they want [facility]. No referral sent at this time. Patient will need therapy evaluation. [facility], patient has 12 rehab days left under Medicare. Will follow.       Shauna Jensen RN

## 2024-01-23 NOTE — CARE PLAN
Problem: Pain  Goal: Participates in PT with improved pain control throughout the shift  Outcome: Not Progressing     Problem: Pain  Goal: My pain/discomfort is manageable  Outcome: Progressing  Flowsheets (Taken 1/23/2024 1634)  Resident's pain/discomfort is manageable:   Include resident/family/caregiver in decisions related to pain management   Offer non-pharmacological pain management interventions   Administer pain medication prior to activities that may trigger pain   Identify and avoid pain triggers     Problem: Safety  Goal: Patient will be injury free during hospitalization  Outcome: Progressing  Goal: I will remain free of falls  Outcome: Progressing     Problem: Daily Care  Goal: Daily care needs are met  Outcome: Progressing  Flowsheets (Taken 1/23/2024 1634)  Daily care needs are met:   Assess and monitor ability to perform self care and identify potential discharge needs   Assess skin integrity/risk for skin breakdown   Assist patient with activities of daily living as needed   Provide mouth care     Problem: Psychosocial Needs  Goal: Demonstrates ability to cope with hospitalization/illness  Outcome: Progressing  Flowsheets (Taken 1/23/2024 1634)  Demonstrates ability to cope with hospitalization/illness:   Encourage verbalization of feelings/concerns/expectations   Provide low-stimulation environment as needed   Assist resident to identify and practice own strengths and abilities   Encourage resident to set and complete small goals for self   Encourage participation in diversional activities   Include resident/family/caregiver in decisions related to psychosocial needs  Goal: Collaborate with me, my family, and caregiver to identify my specific goals  Outcome: Progressing     Problem: Discharge Barriers  Goal: My discharge needs are met  Outcome: Progressing  Flowsheets (Taken 1/23/2024 1634)  Resident's discharge needs are met: Identify potential discharge barriers on admission and throughout  stay     Problem: Fall/Injury  Goal: Not fall by end of shift  Outcome: Progressing  Goal: Be free from injury by end of the shift  Outcome: Progressing  Goal: Verbalize understanding of personal risk factors for fall in the hospital  Outcome: Progressing  Goal: Verbalize understanding of risk factor reduction measures to prevent injury from fall in the home  Outcome: Progressing  Goal: Pace activities to prevent fatigue by end of the shift  Outcome: Progressing     Problem: Skin  Goal: Decreased wound size/increased tissue granulation at next dressing change  Outcome: Progressing  Flowsheets (Taken 1/23/2024 1634)  Decreased wound size/increased tissue granulation at next dressing change:   Protective dressings over bony prominences   Utilize specialty bed per algorithm  Goal: Participates in plan/prevention/treatment measures  Outcome: Progressing  Flowsheets (Taken 1/23/2024 1634)  Participates in plan/prevention/treatment measures: Elevate heels  Goal: Prevent/manage excess moisture  Outcome: Progressing  Flowsheets (Taken 1/23/2024 1634)  Prevent/manage excess moisture:   Cleanse incontinence/protect with barrier cream   Follow provider orders for dressing changes   Monitor for/manage infection if present  Goal: Prevent/minimize sheer/friction injuries  Outcome: Progressing  Flowsheets (Taken 1/23/2024 1634)  Prevent/minimize sheer/friction injuries:   HOB 30 degrees or less   Complete micro-shifts as needed if patient unable. Adjust patient position to relieve pressure points, not a full turn   Turn/reposition every 2 hours/use positioning/transfer devices   Use pull sheet   Utilize specialty bed per algorithm  Goal: Promote/optimize nutrition  Outcome: Progressing  Flowsheets (Taken 1/23/2024 1634)  Promote/optimize nutrition:   Offer water/supplements/favorite foods   Consume > 50% meals/supplements   Monitor/record intake including meals  Goal: Promote skin healing  Outcome: Progressing  Flowsheets (Taken  1/23/2024 1634)  Promote skin healing:   Assess skin/pad under line(s)/device(s)   Ensure correct size (line/device) and apply per  instructions   Protective dressings over bony prominences   Rotate device position/do not position patient on device   Turn/reposition every 2 hours/use positioning/transfer devices     Problem: Pain  Goal: Takes deep breaths with improved pain control throughout the shift  Outcome: Progressing  Goal: Turns in bed with improved pain control throughout the shift  Outcome: Progressing  Goal: Walks with improved pain control throughout the shift  Outcome: Progressing  Goal: Performs ADL's with improved pain control throughout shift  Outcome: Progressing  Goal: Free from opioid side effects throughout the shift  Outcome: Progressing  Goal: Free from acute confusion related to pain meds throughout the shift  Outcome: Progressing     Problem: Respiratory  Goal: Clear secretions with interventions this shift  Outcome: Progressing  Flowsheets (Taken 1/23/2024 1634)  Clear secretions with interventions this shift:   Encourage/provide pulmonary hygiene/secretion clearance   Med administration/monitoring of effect  Goal: Minimize anxiety/maximize coping throughout shift  Outcome: Progressing  Flowsheets (Taken 1/23/2024 1634)  Minimize anxiety/maximize coping throughout shift:   Med administration/monitoring of effect   Monitor pain/anxiety level  Goal: Minimal/no exertional discomfort or dyspnea this shift  Outcome: Progressing  Goal: No signs of respiratory distress (eg. Use of accessory muscles. Peds grunting)  Outcome: Progressing  Goal: Patent airway maintained this shift  Outcome: Progressing  Goal: Tolerate pulmonary toileting this shift  Outcome: Progressing  Goal: Verbalize decreased shortness of breath this shift  Outcome: Progressing  Flowsheets (Taken 1/23/2024 1634)  Verbalize decreased shortness of breath this shift: Encourage/provide pulmonary hygiene/secretion  clearance  Goal: Wean oxygen to maintain O2 saturation per order/standard this shift  Outcome: Progressing  Flowsheets (Taken 1/23/2024 1634)  Wean oxygen to maintain O2 saturation per order/standard this shift: Encourage activity/mobility  Goal: Increase self care and/or family involvement in next 24 hours  Outcome: Progressing   The patient's goals for the shift include unable to state - remains confused, reorienting often    The clinical goals for the shift include remain hemodynamically stable - titrated levophed as tolerated      Problem: Pain  Goal: Participates in PT with improved pain control throughout the shift  Outcome: Not Progressing - PT not consulted yet due to hypotension

## 2024-01-23 NOTE — PROGRESS NOTES
CONSULT PROGRESS NOTES    SERVICE DATE: 1/23/2024   SERVICE TIME: 12:20 PM    CONSULTING SERVICE: Nephrology    ASSESSMENT AND PLAN   1.  End-stage renal disease  2.  Hypotension  3.  Hyponatremia  4.  Hypokalemia  5.  Anemia of chronic kidney disease     Dialysis probably tomorrow via [facility] again with low temperature, and will attempt 1 L fluid removal.  May have clinical uremia as well.  Dialysis to help mitigate this.  She is extremely malnourished with low albumin, low prealbumin, no major muscle mass.  Hyponatremia from volume overload in this patient with oliguria.  Hypokalemia, may require 4K bath.  Hemoglobin is at goal.  Use IV albumin with hemodialysis.  Briefly touched upon goals of care again today with the patient and her daughter today.  At present, would like to attempt dialysis with minor volume removal tomorrow, try to wean from pressors with a target systolic blood pressure greater than 70.  Typically, she has no major mental status changes with a blood pressure above 70.  I do feel she is uremic partly explains her delirium/encephalopathy now.  Case discussed with the ICU and palliative care teams.  Will follow her this week.    Challenging case, lots of major medical problems, profound hypotension requiring pressors.    SUBJECTIVE  INTERVAL HPI: She is being fed by her daughter, who is at bedside.  She is a little more oriented today.  Tolerated dialysis yesterday, no volume was removed.  Pressors were temporarily increased during treatment.    MEDICATIONS:  acetaminophen, 650 mg, oral, TID  apixaban, 2.5 mg, oral, BID  doxycylcine, 100 mg, oral, Daily  [Held by provider] doxycycline, 100 mg, intravenous, q24h  escitalopram, 10 mg, oral, Nightly  [Held by provider] gabapentin, 100 mg, oral, BID  heparin, 2,000 Units, intra-catheter, After Dialysis  heparin, 2,000 Units, intra-catheter, After Dialysis  insulin lispro, 0-10 Units, subcutaneous, TID with meals  ipratropium-albuteroL, 3 mL,  nebulization, q6h  midodrine, 15 mg, oral, TID with meals  nystatin, , Topical, BID  nystatin, , Topical, BID  pantoprazole, 40 mg, oral, Daily   Or  pantoprazole, 40 mg, intravenous, Daily  perflutren lipid microspheres, 0.5-10 mL of dilution, intravenous, Once in imaging  perflutren protein A microsphere, 0.5 mL, intravenous, Once in imaging  piperacillin-tazobactam, 2.25 g, intravenous, q8h  sodium chloride, 3 mL, nebulization, q6h GULSHAN  sulfur hexafluoride microsphr, 2 mL, intravenous, Once in imaging  vancomycin, 125 mg, oral, 4x daily  zinc oxide, 1 Application, Topical, BID       norepinephrine, 0.01-3 mcg/kg/min, Last Rate: 0.36 mcg/kg/min (01/23/24 1100)       PRN medications: dextrose 10 % in water (D10W), dextrose, glucagon, oxyCODONE-acetaminophen, oxygen, sennosides-docusate sodium, vancomycin     OBJECTIVE  PHYSICAL EXAM:   Heart Rate:  []   Temp:  [35.7 °C (96.3 °F)-36.3 °C (97.3 °F)]   Resp:  [12-28]   Weight:  [77.8 kg (171 lb 8.3 oz)-79 kg (174 lb 2.6 oz)]   SpO2:  [89 %-99 %]   Body mass index is 31.85 kg/m².  Chronically ill-appearing elderly white woman  Pale skin  Right-sided hand swelling  Left-sided finger amputation  Internal jugular tunneled hemodialysis catheter in place  She has some pretibial edema on both lower extremities  Soft abdomen  No Amos  No obvious joint deformities  Moist mucosa  Hearing seems to be intact  Phonation intact  Arterial line placed  Right internal jugular triple-lumen catheter in place    DATA:   Labs:  Results for orders placed or performed during the hospital encounter of 01/19/24 (from the past 96 hour(s))   Sars-CoV-2 and Influenza A/B PCR   Result Value Ref Range    Flu A Result Not Detected Not Detected    Flu B Result Not Detected Not Detected    Coronavirus 2019, PCR Not Detected Not Detected   CBC and Auto Differential   Result Value Ref Range    WBC      nRBC      RBC      Hemoglobin      Hematocrit      MCV      MCH      MCHC      RDW       Platelets      Neutrophils %      Immature Granulocytes %, Automated      Lymphocytes %      Monocytes %      Eosinophils %      Basophils %      Neutrophils Absolute      Immature Granulocytes Absolute, Automated      Lymphocytes Absolute      Monocytes Absolute      Eosinophils Absolute      Basophils Absolute     Serial Troponin, 2 Hour ([facility])   Result Value Ref Range    Troponin T, High Sensitivity 345 (HH) <=14 ng/L   Blood Culture    Specimen: Peripheral Venipuncture; Blood culture   Result Value Ref Range    Blood Culture No growth at 3 days    Blood Culture    Specimen: Peripheral Venipuncture; Blood culture   Result Value Ref Range    Blood Culture No growth at 3 days    BLOOD GAS LACTIC ACID, VENOUS   Result Value Ref Range    POCT Lactate, Venous 2.3 (H) 0.4 - 2.0 mmol/L   Comprehensive metabolic panel   Result Value Ref Range    Glucose 130 (H) 65 - 99 mg/dL    Sodium 131 (L) 133 - 145 mmol/L    Potassium 4.2 3.4 - 5.1 mmol/L    Chloride 93 (L) 97 - 107 mmol/L    Bicarbonate 28 24 - 31 mmol/L    Urea Nitrogen 15 8 - 25 mg/dL    Creatinine 2.90 (H) 0.40 - 1.60 mg/dL    eGFR 17 (L) >60 mL/min/1.73m*2    Calcium 8.0 (L) 8.5 - 10.4 mg/dL    Albumin 2.4 (L) 3.5 - 5.0 g/dL    Alkaline Phosphatase 107 35 - 125 U/L    Total Protein 5.3 (L) 5.9 - 7.9 g/dL    AST 30 5 - 40 U/L    Bilirubin, Total 1.0 0.1 - 1.2 mg/dL    ALT 13 5 - 40 U/L    Anion Gap 10 <=19 mmol/L   Morphology   Result Value Ref Range    RBC Morphology      Polychromasia      Target Cells      Ovalocytes     ECG 12 lead   Result Value Ref Range    Ventricular Rate 106 BPM    Atrial Rate 106 BPM    DC Interval 180 ms    QRS Duration 138 ms    QT Interval 414 ms    QTC Calculation(Bazett) 549 ms    R Axis 151 degrees    T Axis 29 degrees    QRS Count 18 beats    Q Onset 192 ms    P Onset 102 ms    P Offset 156 ms    T Offset 399 ms    QTC Fredericia 500 ms   Prepare RBC: 1 Units   Result Value Ref Range    PRODUCT CODE [id number]     Unit Number  [id number]     Unit ABO O     Unit RH POS     XM INTEP COMP     Dispense Status RE     Blood Expiration Date February 16, 2024 23:59 EST     PRODUCT BLOOD TYPE 5100     UNIT VOLUME 400    Blood Gas Lactic Acid, Venous   Result Value Ref Range    POCT Lactate, Venous 1.6 0.4 - 2.0 mmol/L   Troponin T   Result Value Ref Range    Troponin T, High Sensitivity 331 (HH) <=14 ng/L   Type and screen   Result Value Ref Range    ABO TYPE O     Rh TYPE POS     ANTIBODY SCREEN NEG    Protime-INR   Result Value Ref Range    Protime 13.9 (H) 9.3 - 12.7 seconds    INR 1.3 (H) 0.9 - 1.2   Iron and TIBC   Result Value Ref Range    Iron 21 (L) 30 - 160 ug/dL    UIBC 76 (L) 110 - 370 ug/dL    TIBC 97 (L) 228 - 428 ug/dL    % Saturation 22 12 - 50 %   Magnesium   Result Value Ref Range    Magnesium 1.50 (L) 1.60 - 3.10 mg/dL   Prepare RBC: 1 Units   Result Value Ref Range    PRODUCT CODE [id number]     Unit Number [id number]     Unit ABO O     Unit RH POS     XM INTEP COMP     Dispense Status RE     Blood Expiration Date February 15, 2024 23:59 EST     PRODUCT BLOOD TYPE 5100     UNIT VOLUME 350    Urinalysis with Reflex Culture and Microscopic   Result Value Ref Range    Color, Urine Dark-Orange (N) Light-Yellow, Yellow, Dark-Yellow    Appearance, Urine Ex.Turbid (N) Clear    Specific Gravity, Urine 1.027 1.005 - 1.035    pH, Urine 6.5 5.0, 5.5, 6.0, 6.5, 7.0, 7.5, 8.0    Protein, Urine 300 (3+) (A) NEGATIVE, 10 (TRACE), 20 (TRACE) mg/dL    Glucose, Urine Normal Normal mg/dL    Blood, Urine 1.0 (3+) (A) NEGATIVE    Ketones, Urine NEGATIVE NEGATIVE mg/dL    Bilirubin, Urine NEGATIVE NEGATIVE    Urobilinogen, Urine Normal Normal mg/dL    Nitrite, Urine NEGATIVE NEGATIVE    Leukocyte Esterase, Urine 500 Sharmin/µL (A) NEGATIVE   Extra Urine Gray Tube   Result Value Ref Range    Extra Tube Hold for add-ons.    Microscopic Only, Urine   Result Value Ref Range    WBC, Urine >50 (A) 1-5, NONE /HPF    WBC Clumps, Urine MANY Reference  range not established. /HPF    RBC, Urine >20 (A) NONE, 1-2, 3-5 /HPF    Bacteria, Urine 4+ (A) NONE SEEN /HPF   Urine Culture    Specimen: Indwelling (Amos) Catheter; Urine   Result Value Ref Range    Urine Culture >100,000 Proteus mirabilis (A)        Susceptibility    Proteus mirabilis - MICROSCAN     Ampicillin  Susceptible      Cefazolin  Susceptible      Cefazolin (uncomplicated UTIs only)  Susceptible      Ciprofloxacin  Susceptible      Gentamicin  Susceptible      Piperacillin/Tazobactam  Susceptible      Trimethoprim/Sulfamethoxazole  Susceptible      Tetracycline  Resistant    BLOOD GAS LACTIC ACID, VENOUS   Result Value Ref Range    POCT Lactate, Venous 2.1 (H) 0.4 - 2.0 mmol/L   POCT GLUCOSE   Result Value Ref Range    POCT Glucose 145 (H) 74 - 99 mg/dL   POCT GLUCOSE   Result Value Ref Range    POCT Glucose 116 (H) 74 - 99 mg/dL   Renal Function Panel   Result Value Ref Range    Glucose 138 (H) 65 - 99 mg/dL    Sodium 135 133 - 145 mmol/L    Potassium 4.5 3.4 - 5.1 mmol/L    Chloride 96 (L) 97 - 107 mmol/L    Bicarbonate 23 (L) 24 - 31 mmol/L    Urea Nitrogen 19 8 - 25 mg/dL    Creatinine 3.40 (H) 0.40 - 1.60 mg/dL    eGFR 14 (L) >60 mL/min/1.73m*2    Calcium 8.6 8.5 - 10.4 mg/dL    Phosphorus 2.8 2.5 - 4.5 mg/dL    Albumin 2.5 (L) 3.5 - 5.0 g/dL    Anion Gap 16 <=19 mmol/L   CBC and Auto Differential   Result Value Ref Range    WBC 22.9 (H) 4.4 - 11.3 x10*3/uL    nRBC 0.0 0.0 - 0.0 /100 WBCs    RBC 3.91 (L) 4.00 - 5.20 x10*6/uL    Hemoglobin 11.5 (L) 12.0 - 16.0 g/dL    Hematocrit 37.8 36.0 - 46.0 %    MCV 97 80 - 100 fL    MCH 29.4 26.0 - 34.0 pg    MCHC 30.4 (L) 32.0 - 36.0 g/dL    RDW 24.7 (H) 11.5 - 14.5 %    Platelets 417 150 - 450 x10*3/uL    Neutrophils % 65.5 40.0 - 80.0 %    Immature Granulocytes %, Automated 0.9 0.0 - 0.9 %    Lymphocytes % 22.6 13.0 - 44.0 %    Monocytes % 8.1 2.0 - 10.0 %    Eosinophils % 2.4 0.0 - 6.0 %    Basophils % 0.5 0.0 - 2.0 %    Neutrophils Absolute 15.03 (H)  1.60 - 5.50 x10*3/uL    Immature Granulocytes Absolute, Automated 0.20 0.00 - 0.50 x10*3/uL    Lymphocytes Absolute 5.17 (H) 0.80 - 3.00 x10*3/uL    Monocytes Absolute 1.86 (H) 0.05 - 0.80 x10*3/uL    Eosinophils Absolute 0.54 (H) 0.00 - 0.40 x10*3/uL    Basophils Absolute 0.12 (H) 0.00 - 0.10 x10*3/uL   VERIFY ABO/Rh Group Test   Result Value Ref Range    ABO TYPE O     Rh TYPE POS    Morphology   Result Value Ref Range    RBC Morphology See Below     Polychromasia Mild     Target Cells Few     Bhavesh Cells Many     Clumped Platelets Present    POCT GLUCOSE   Result Value Ref Range    POCT Glucose 163 (H) 74 - 99 mg/dL   Blood Gas Lactic Acid, Venous   Result Value Ref Range    POCT Lactate, Venous 1.8 0.4 - 2.0 mmol/L   Cortisol AM   Result Value Ref Range    Cortisol  A.M. 36.4 (H) 5.0 - 20.0 ug/dL   Prealbumin   Result Value Ref Range    Prealbumin <3.0 (L) 18.0 - 40.0 mg/dL   POCT GLUCOSE   Result Value Ref Range    POCT Glucose 141 (H) 74 - 99 mg/dL   pH, Body Fluid   Result Value Ref Range    pH, Fluid 7.20 See Below   Lactate Dehydrogenase, Fluid   Result Value Ref Range    LD, Fluid 64 Not established. U/L   Glucose, Fluid   Result Value Ref Range    Glucose, Fluid 173 Not established mg/dL   Protein, Total Fluid   Result Value Ref Range    Protein, Total Fluid 1.3 Not established g/dL   Body Fluid Cell Count   Result Value Ref Range    Color, Fluid Straw Colorless, Straw, Yellow    Clarity, Fluid Clear Clear    WBC, Fluid 67 See Comment /uL    RBC, Fluid 1,000 0  /uL /uL   Body Fluid Differential   Result Value Ref Range    Neutrophils %, Manual, Fluid 12 <25 % %    Lymphocytes %, Manual, Fluid 36 <75 % %    Mono/Macrophages %, Manual, Fluid 52 <70 % %    Eosinophils %, Manual, Fluid 0 0 % %    Basophils %, Manual, Fluid 0 0 % %    Immature Granulocytes %, Manual, Fluid 0 0 % %    Blasts %, Manual, Fluid 0 0 % %    Unclassified Cells %, Manual, Fluid 0 0 % %    Plasma Cells %, Manual, Fluid 0 0 % %    Total  Cells Counted, Fluid 100    Triglycerides, Fluid   Result Value Ref Range    Triglycerides, Fluid 33 No established mg/dL   Amylase, Fluid   Result Value Ref Range    Amylase, Fluid <10 Not established. U/L   Procalcitonin   Result Value Ref Range    Procalcitonin 2.12 (H) <=0.07 ng/mL   Blood Gas Venous Full Panel   Result Value Ref Range    POCT pH, Venous 7.30 (L) 7.33 - 7.43 pH    POCT pCO2, Venous 49 41 - 51 mm Hg    POCT pO2, Venous 149 (H) 35 - 45 mm Hg    POCT SO2, Venous 100 (H) 45 - 75 %    POCT Oxy Hemoglobin, Venous 97.3 (H) 45.0 - 75.0 %    POCT Hematocrit Calculated, Venous 35.0 (L) 36.0 - 46.0 %    POCT Sodium, Venous 129 (L) 136 - 145 mmol/L    POCT Potassium, Venous 3.7 3.5 - 5.3 mmol/L    POCT Chloride, Venous 96 (L) 98 - 107 mmol/L    POCT Ionized Calicum, Venous 1.15 1.10 - 1.33 mmol/L    POCT Glucose, Venous 187 (H) 74 - 99 mg/dL    POCT Lactate, Venous 2.0 0.4 - 2.0 mmol/L    POCT Base Excess, Venous -2.6 (L) -2.0 - 3.0 mmol/L    POCT HCO3 Calculated, Venous 24.1 22.0 - 26.0 mmol/L    POCT Hemoglobin, Venous 11.5 (L) 12.0 - 16.0 g/dL    POCT Anion Gap, Venous 13.0 10.0 - 25.0 mmol/L    Patient Temperature 37.0 degrees Celsius    FiO2 30 %   Lactate Dehydrogenase   Result Value Ref Range    LDH     Protein, Total   Result Value Ref Range    Total Protein 5.4 (L) 5.9 - 7.9 g/dL   POCT GLUCOSE   Result Value Ref Range    POCT Glucose 129 (H) 74 - 99 mg/dL   POCT GLUCOSE   Result Value Ref Range    POCT Glucose 192 (H) 74 - 99 mg/dL   CBC and Auto Differential   Result Value Ref Range    WBC 21.0 (H) 4.4 - 11.3 x10*3/uL    nRBC 0.0 0.0 - 0.0 /100 WBCs    RBC 3.93 (L) 4.00 - 5.20 x10*6/uL    Hemoglobin 11.6 (L) 12.0 - 16.0 g/dL    Hematocrit 36.5 36.0 - 46.0 %    MCV 93 80 - 100 fL    MCH 29.5 26.0 - 34.0 pg    MCHC 31.8 (L) 32.0 - 36.0 g/dL    RDW 24.2 (H) 11.5 - 14.5 %    Platelets 424 150 - 450 x10*3/uL    Neutrophils % 77.0 40.0 - 80.0 %    Immature Granulocytes %, Automated 0.9 0.0 - 0.9 %     Lymphocytes % 12.9 13.0 - 44.0 %    Monocytes % 7.8 2.0 - 10.0 %    Eosinophils % 0.9 0.0 - 6.0 %    Basophils % 0.5 0.0 - 2.0 %    Neutrophils Absolute 16.18 (H) 1.60 - 5.50 x10*3/uL    Immature Granulocytes Absolute, Automated 0.19 0.00 - 0.50 x10*3/uL    Lymphocytes Absolute 2.71 0.80 - 3.00 x10*3/uL    Monocytes Absolute 1.63 (H) 0.05 - 0.80 x10*3/uL    Eosinophils Absolute 0.19 0.00 - 0.40 x10*3/uL    Basophils Absolute 0.10 0.00 - 0.10 x10*3/uL   Magnesium   Result Value Ref Range    Magnesium 1.50 (L) 1.60 - 3.10 mg/dL   Prealbumin   Result Value Ref Range    Prealbumin <3.0 (L) 18.0 - 40.0 mg/dL   Comprehensive metabolic panel   Result Value Ref Range    Glucose 209 (H) 65 - 99 mg/dL    Sodium 133 133 - 145 mmol/L    Potassium 3.9 3.4 - 5.1 mmol/L    Chloride 97 97 - 107 mmol/L    Bicarbonate 23 (L) 24 - 31 mmol/L    Urea Nitrogen 24 8 - 25 mg/dL    Creatinine 3.90 (H) 0.40 - 1.60 mg/dL    eGFR 12 (L) >60 mL/min/1.73m*2    Calcium 8.7 8.5 - 10.4 mg/dL    Albumin 2.1 (L) 3.5 - 5.0 g/dL    Alkaline Phosphatase 113 35 - 125 U/L    Total Protein 5.5 (L) 5.9 - 7.9 g/dL    AST 19 5 - 40 U/L    Bilirubin, Total 1.0 0.1 - 1.2 mg/dL    ALT 11 5 - 40 U/L    Anion Gap 13 <=19 mmol/L   Phosphorus   Result Value Ref Range    Phosphorus 4.2 2.5 - 4.5 mg/dL   Morphology   Result Value Ref Range    RBC Morphology See Below     Target Cells Few     Ovalocytes Few     Bhavesh Cells Few    POCT GLUCOSE   Result Value Ref Range    POCT Glucose 200 (H) 74 - 99 mg/dL   POCT GLUCOSE   Result Value Ref Range    POCT Glucose 156 (H) 74 - 99 mg/dL   POCT GLUCOSE   Result Value Ref Range    POCT Glucose 174 (H) 74 - 99 mg/dL   POCT GLUCOSE   Result Value Ref Range    POCT Glucose 170 (H) 74 - 99 mg/dL   POCT GLUCOSE   Result Value Ref Range    POCT Glucose 181 (H) 74 - 99 mg/dL   C. difficile, PCR    Specimen: Stool   Result Value Ref Range    C. difficile, PCR Not Detected Not Detected   CBC and Auto Differential   Result Value Ref  Range    WBC 21.2 (H) 4.4 - 11.3 x10*3/uL    nRBC 0.2 (H) 0.0 - 0.0 /100 WBCs    RBC 4.11 4.00 - 5.20 x10*6/uL    Hemoglobin 12.0 12.0 - 16.0 g/dL    Hematocrit 37.5 36.0 - 46.0 %    MCV 91 80 - 100 fL    MCH 29.2 26.0 - 34.0 pg    MCHC 32.0 32.0 - 36.0 g/dL    RDW 24.4 (H) 11.5 - 14.5 %    Platelets 355 150 - 450 x10*3/uL    Neutrophils % 74.6 40.0 - 80.0 %    Immature Granulocytes %, Automated 1.1 (H) 0.0 - 0.9 %    Lymphocytes % 14.6 13.0 - 44.0 %    Monocytes % 6.1 2.0 - 10.0 %    Eosinophils % 3.2 0.0 - 6.0 %    Basophils % 0.4 0.0 - 2.0 %    Neutrophils Absolute 15.81 (H) 1.60 - 5.50 x10*3/uL    Immature Granulocytes Absolute, Automated 0.23 0.00 - 0.50 x10*3/uL    Lymphocytes Absolute 3.09 (H) 0.80 - 3.00 x10*3/uL    Monocytes Absolute 1.29 (H) 0.05 - 0.80 x10*3/uL    Eosinophils Absolute 0.68 (H) 0.00 - 0.40 x10*3/uL    Basophils Absolute 0.09 0.00 - 0.10 x10*3/uL   Comprehensive metabolic panel   Result Value Ref Range    Glucose 159 (H) 65 - 99 mg/dL    Sodium 127 (L) 133 - 145 mmol/L    Potassium 3.6 3.4 - 5.1 mmol/L    Chloride 92 (L) 97 - 107 mmol/L    Bicarbonate 21 (L) 24 - 31 mmol/L    Urea Nitrogen 27 (H) 8 - 25 mg/dL    Creatinine 4.20 (H) 0.40 - 1.60 mg/dL    eGFR 11 (L) >60 mL/min/1.73m*2    Calcium 8.6 8.5 - 10.4 mg/dL    Albumin 1.9 (L) 3.5 - 5.0 g/dL    Alkaline Phosphatase 106 35 - 125 U/L    Total Protein 5.2 (L) 5.9 - 7.9 g/dL    AST 17 5 - 40 U/L    Bilirubin, Total 0.9 0.1 - 1.2 mg/dL    ALT 10 5 - 40 U/L    Anion Gap 14 <=19 mmol/L   Magnesium   Result Value Ref Range    Magnesium 1.90 1.60 - 3.10 mg/dL   Phosphorus   Result Value Ref Range    Phosphorus 4.0 2.5 - 4.5 mg/dL   Morphology   Result Value Ref Range    RBC Morphology See Below     Target Cells Few     Ovalocytes Few     Bhavesh Cells Few    POCT GLUCOSE   Result Value Ref Range    POCT Glucose 109 (H) 74 - 99 mg/dL   MRSA Surveillance for Vancomycin De-escalation, PCR    Specimen: Anterior Nares; Swab   Result Value Ref Range     MRSA PCR Not Detected Not Detected   POCT GLUCOSE   Result Value Ref Range    POCT Glucose 121 (H) 74 - 99 mg/dL   Sedimentation rate, automated   Result Value Ref Range    Sedimentation Rate 49 (H) 0 - 30 mm/h   POCT GLUCOSE   Result Value Ref Range    POCT Glucose 128 (H) 74 - 99 mg/dL   Transthoracic Echo (TTE) Complete   Result Value Ref Range    AV pk david 0.84 m/s    LVOT diam 1.90 cm    MV E/A ratio 1.32     LVIDd 3.66 cm    AV pk grad 2.8 mmHg    Aortic Valve Area by Continuity of Peak Velocity 1.57 cm2    LV A4C EF 68.3    POCT GLUCOSE   Result Value Ref Range    POCT Glucose 145 (H) 74 - 99 mg/dL   CBC and Auto Differential   Result Value Ref Range    WBC 16.5 (H) 4.4 - 11.3 x10*3/uL    nRBC 0.2 (H) 0.0 - 0.0 /100 WBCs    RBC 3.62 (L) 4.00 - 5.20 x10*6/uL    Hemoglobin 10.7 (L) 12.0 - 16.0 g/dL    Hematocrit 32.4 (L) 36.0 - 46.0 %    MCV 90 80 - 100 fL    MCH 29.6 26.0 - 34.0 pg    MCHC 33.0 32.0 - 36.0 g/dL    RDW 24.0 (H) 11.5 - 14.5 %    Platelets 233 150 - 450 x10*3/uL    Neutrophils % 74.9 40.0 - 80.0 %    Immature Granulocytes %, Automated 0.7 0.0 - 0.9 %    Lymphocytes % 14.4 13.0 - 44.0 %    Monocytes % 6.6 2.0 - 10.0 %    Eosinophils % 3.0 0.0 - 6.0 %    Basophils % 0.4 0.0 - 2.0 %    Neutrophils Absolute 12.32 (H) 1.60 - 5.50 x10*3/uL    Immature Granulocytes Absolute, Automated 0.12 0.00 - 0.50 x10*3/uL    Lymphocytes Absolute 2.37 0.80 - 3.00 x10*3/uL    Monocytes Absolute 1.08 (H) 0.05 - 0.80 x10*3/uL    Eosinophils Absolute 0.50 (H) 0.00 - 0.40 x10*3/uL    Basophils Absolute 0.07 0.00 - 0.10 x10*3/uL   Comprehensive metabolic panel   Result Value Ref Range    Glucose 124 (H) 65 - 99 mg/dL    Sodium 128 (L) 133 - 145 mmol/L    Potassium 3.9 3.4 - 5.1 mmol/L    Chloride 93 (L) 97 - 107 mmol/L    Bicarbonate 22 (L) 24 - 31 mmol/L    Urea Nitrogen 15 8 - 25 mg/dL    Creatinine 2.60 (H) 0.40 - 1.60 mg/dL    eGFR 19 (L) >60 mL/min/1.73m*2    Calcium 8.0 (L) 8.5 - 10.4 mg/dL    Albumin 2.6 (L) 3.5 -  5.0 g/dL    Alkaline Phosphatase 119 35 - 125 U/L    Total Protein 5.1 (L) 5.9 - 7.9 g/dL    AST 16 5 - 40 U/L    Bilirubin, Total 1.6 (H) 0.1 - 1.2 mg/dL    ALT 10 5 - 40 U/L    Anion Gap 13 <=19 mmol/L   Magnesium   Result Value Ref Range    Magnesium 1.70 1.60 - 3.10 mg/dL   Phosphorus   Result Value Ref Range    Phosphorus 2.3 (L) 2.5 - 4.5 mg/dL   Morphology   Result Value Ref Range    RBC Morphology See Below     Target Cells Few     Ovalocytes Few     Anchorage Cells Few    POCT GLUCOSE   Result Value Ref Range    POCT Glucose 95 74 - 99 mg/dL         SIGNATURE: Saeed Mondragon MD PATIENT NAME: Ayanna Gross   DATE: January 23, 2024 MRN: [id number]   TIME: 12:20 PM PAGER: [phone]

## 2024-01-23 NOTE — CONSULTS
Reason For Consult  Worsening atherosclerosis    History Of Present Illness  Ayanna Gross is a 73 y.o. female presenting with a known history of peripheral arterial disease and bilateral heel wounds.  She was admitted with fevers and hypotension.  She was also profoundly anemic and was given 2 units of packed red blood cells.  Her heel wounds  have been present for greater than 1 year.  She does see podiatry on an outpatient basis.  Unclear of the wound care regimen when she is outpatient.  She was seen by podiatry during last hospitalization on 12/7/2023.  They did not complete any surgical intervention but did recommend offloading with bilateral heel boots.  Patient has known peripheral arterial disease with most recent testing completed in December 2023 showing evidence of moderate arterial occlusive disease bilaterally.  As mentioned previously she has chronic nonhealing ulcerations to both heels.  She denies any ischemic rest pain.  She is nonambulatory and therefore has no claudication.     Past Medical History  She has a past medical history of Asthma, CAD (coronary artery disease), CHF (congestive heart failure) (CMS/[facility]), Chronic kidney disease on chronic dialysis (CMS/[facility]), Hypertension, Personal history of diseases of the blood and blood-forming organs and certain disorders involving the immune mechanism, Sleep apnea, and Type 2 diabetes mellitus without complications (CMS/[facility]) (09/15/2022).    Surgical History  She has a past surgical history that includes Other surgical history (11/18/2013); Carpal tunnel release (11/18/2013); Umbilical hernia repair (11/18/2013); Tonsillectomy (11/18/2013); Hand surgery (11/18/2013); Other surgical history (11/18/2013); Other surgical history (11/18/2013); Other surgical history (04/04/2022); Other surgical history (04/04/2022); Other surgical history (04/04/2022); Mastectomy, partial (04/10/2014); [place] lymph node biopsy (04/10/2014); US guided percutaneous  "placement (6/29/2023); and MR angio head wo IV contrast (8/29/2023).     Social History  She reports that she has never smoked. She has never used smokeless tobacco. She reports that she does not currently use alcohol. She reports that she does not use drugs.    Family History  Family History   Problem Relation Name Age of Onset    Lymphoma Mother      Prostate cancer Father          passed away fabiana min 2017        Allergies  Patient has no known allergies.    Review of Systems  Review of systems unable to be obtained as patient is poor historian and continues to fall asleep during my interview.     Physical Exam  General: Patient follows commands, falls asleep easily.  Awakens to voice command  HEENT: Head is atraumatic, normocephalic.   Cardiac: Normal S1-S2.  Regular rate and rhythm.  No murmurs.  Respiratory: Lungs clear to auscultation.  No adventitious sounds.  Abdomen: Soft, nondistended, nontender.  Bowel sounds x4 quadrants.  Pulse exam: Palpable brachial and radial pulses bilaterall.   femoral, popliteal, pedal pulses are palpable bilaterally.  Extremities: Lower extremities are warm to the touch.  I did not remove her bilateral lower extremity dressings.  However, I did review the pictures.  Left heel ulcer is necrotic.  There is also an necrotic ulceration midfoot.  Right heel with necrotic ulceration.,  Mild surrounding tissue erythema.  Neuro: Moves all extremities spontaneously.  No focal deficits.  Psych: Appropriate affect.  Answers questions appropriately.     Last Recorded Vitals  Blood pressure 85/65, pulse (!) 118, temperature 36.3 °C (97.3 °F), temperature source Temporal, resp. rate 19, height 1.575 m (5' 2\"), weight 79 kg (174 lb 2.6 oz), SpO2 93 %.    Relevant Results  Scheduled medications  acetaminophen, 650 mg, oral, TID  apixaban, 2.5 mg, oral, BID  doxycylcine, 100 mg, oral, Daily  [Held by provider] doxycycline, 100 mg, intravenous, q24h  escitalopram, 10 mg, oral, " Nightly  gabapentin, 100 mg, oral, BID  heparin, 2,000 Units, intra-catheter, After Dialysis  heparin, 2,000 Units, intra-catheter, After Dialysis  insulin lispro, 0-10 Units, subcutaneous, TID with meals  ipratropium-albuteroL, 3 mL, nebulization, q6h  midodrine, 15 mg, oral, TID with meals  nystatin, , Topical, BID  nystatin, , Topical, BID  pantoprazole, 40 mg, oral, Daily   Or  pantoprazole, 40 mg, intravenous, Daily  perflutren lipid microspheres, 0.5-10 mL of dilution, intravenous, Once in imaging  perflutren protein A microsphere, 0.5 mL, intravenous, Once in imaging  piperacillin-tazobactam, 2.25 g, intravenous, q8h  sodium chloride, 3 mL, nebulization, q6h GUL[place]AN  sulfur hexafluoride microsphr, 2 mL, intravenous, Once in imaging  vancomycin, 125 mg, oral, 4x daily  zinc oxide, 1 Application, Topical, BID      Continuous medications  norepinephrine, 0.01-3 mcg/kg/min, Last Rate: 0.33 mcg/kg/min (01/23/24 1339)      PRN medications  PRN medications: dextrose 10 % in water (D10W), dextrose, glucagon, oxyCODONE-acetaminophen, oxygen, sennosides-docusate sodium, vancomycin      Assessment/Plan   Peripheral arterial disease  Gangrene of both heels  Sepsis  Hypotension    Discussed in detail with Dr. Soto.    1.  Patient has known peripheral arterial disease with necrotic ulceration/gangrene of both heels.  Unfortunately, she is requiring high-dose norepinephrine to maintain blood pressure systolically of 70.  She is not a surgical candidate due to hemodynamic instability.  Additionally, I do not believe that completing surgery on this patient would significantly alter her trajectory.  I had an extensive conversation with Meka her daughter who is her power of .  I explained to the reason that the patient is not a surgical candidate given her hemodynamic instability.  Palliative care is also on board and helping to manage goals of care conversation.    I spent 55 minutes in the professional and overall care  of this patient.      Abelino Lewis, APRN-CNP

## 2024-01-23 NOTE — PROGRESS NOTES
"Ayanna Gross is a 73 y.o. female on day 4 of admission presenting with Septic shock (CMS/HCC).    Subjective   Remains on Levophed. She is disoriented. Daughter at the bedside.     Objective     Physical Exam  Constitutional:       Appearance: She is ill-appearing.   HENT:      Head: Normocephalic and atraumatic.   Cardiovascular:      Rate and Rhythm: Normal rate.   Pulmonary:      Effort: Pulmonary effort is normal.   Abdominal:      General: Abdomen is flat. Bowel sounds are normal.   Skin:     General: Skin is warm and dry.      Findings: Rash present.      Comments: Heel wounds with eschar. Sacral rash red and present to b/l buttocks and sacrum.    Neurological:      Mental Status: She is alert. She is disoriented.      Motor: Weakness present.         Last Recorded Vitals  Blood pressure 85/65, pulse (!) 114, temperature 36.1 °C (97 °F), temperature source Temporal, resp. rate 21, height 1.575 m (5' 2\"), weight 79 kg (174 lb 2.6 oz), SpO2 90 %.  Intake/Output last 3 Shifts:  I/O last 3 completed shifts:  In: 2794.4 (35.4 mL/kg) [P.O.:500; I.V.:1694.4 (21.4 mL/kg); Blood:150; IV Piggyback:450]  Out: 500 (6.3 mL/kg) [Stool:500]  Weight: 79 kg     Relevant Results  Lab Results   Component Value Date    WBC 16.5 (H) 01/23/2024    HGB 10.7 (L) 01/23/2024    HCT 32.4 (L) 01/23/2024    MCV 90 01/23/2024     01/23/2024     Lab Results   Component Value Date    GLUCOSE 124 (H) 01/23/2024    CALCIUM 8.0 (L) 01/23/2024     (L) 01/23/2024    K 3.9 01/23/2024    CO2 22 (L) 01/23/2024    CL 93 (L) 01/23/2024    BUN 15 01/23/2024    CREATININE 2.60 (H) 01/23/2024       Assessment/Plan   Principal Problem:    Septic shock (CMS/HCC)  Active Problems:    End stage renal disease (CMS/HCC)    Anemia due to blood loss, acute  1/23: Moisture associated sacral rash washed with soap and water and a combination of triad and nystatin powder applied. Podiatry is currently present at the bedside, awaiting recs for heel " wounds. Will follow for wound care.     1/22:Heel wounds to be evaluated by podiatry for possible debridement. For sacral dermatitis, clean with soap and water, dry completely and apply mixture of zinc oxide and nystatin.        I spent 35 minutes in the professional and overall care of this patient.      Nickolas Gallego, APRCATHRYN-CNP

## 2024-01-23 NOTE — PROGRESS NOTES
[facility] Critical Care Medicine       Date:  1/23/2024  Patient:  Ayanna Gross  YOB: 1950  MRN:  [id number]   Admit Date:  1/19/2024  ========================================================================================================    Chief Complaint   Patient presents with    Shortness of Breath     Patient with SOB and fever. Is very lethargic. Dialysis MWF but did not go today dt SOB and fever         History of Present Illness:  Ayanna Gross is a 73 y.o. female presenting with anemia, fever worsening sacral and lower extremity wounds and concerns from the nursing facility for left pneumonia. Patient presents to the emergency department found to be febrile she was hypotensive but responded to 500 cc fluid bolus she was found to have a hemoglobin of 6 she was begun and ordered for a single unit of blood transfusion will actually provide 2 units of packed cells additional IV fluids to complete her volume resuscitation given her fever clinical evidence of hypovolemia and infection with an elevated lactate of 2.3 she received a total of approximately 1800 mL of fluid she is DNR/DNI by advanced directives but ICU level care is acceptable her granddaughter questions disorder but it was confirmed and signed by her  who is her POA.  She will be admitted to the intensive care unit given her frail compromised state and high risk of morbidity and mortality necessitating IV antibiotics fluids blood transfusion and ultimately will receive dialysis likely tomorrow wound care consultation to general surgery has been requested as well as consultation with the intensivist infectious disease and nephrology wounds are cultured C. difficile is requested she remains on oral vancomycin for suppression given recent C. difficile infection in December she is initiated on pharmacy dosing of vancomycin and Zosyn 2.25 g IV every 6 hours. She continues on Eliquis at this time for DVT prophylaxis as well as  atrial fibrillation.     Further Hx: Ayanna Gross 73-year-old female with past medical history of atrial fibrillation, end-stage renal disease, C. difficile infection, moderate sized recurrent left pleural effusions, and MRSA bacteremia with mitral valve vegetation admitted to ICU for management of septic shock.  On morning exam patient was on 0.22 of levo just to maintain a MAP of 60 and SBP of 88.  Last documented echocardiogram from [facility] shows EF between 55 to 60% with grade 2 left ventricular diastolic dysfunction, and moderate circumferential pericardial effusion.  Unable to find adequate documentation of who treated her endocarditis, but there was documentation that she was currently still receiving treatment when she was admitted to [facility]. On further chart review Dr. Coulter is her infectious disease doctor. She also had 2 thoracentesis's for moderate to large sided left pleural effusions with cytology showing no malignancy, but unable to differentiate between exudative and transudative at this time.      Interval ICU Events:  1/20: Pt missed last dialysis appointment, no signs of urgent need today, but nephrology Dr. Cabello is following. Pt on high requirements of levophed. She is on broad spectrum abx for coverage currently ID Dr. Coulter following. Pt switches from being A&O x 3 and lethargy. Goal today is to get more central access, david, and thoracentesis.      1/21: Pt more lethargic this AM requiring uptitration of her Levophed to 0.28. Diagnostic and therapeutic thoracentesis yesterday over 1L of hazy straw color fluid. Currently still on vanc, zosyn, and doxy for abx. Potential dialysis this today, but no urgency.      1/22: Pt come in and out of lethargic states throughout the day. Still on high doses of levophed this morning. Per nephrologist Dr. Cabello titrate for an SBP goal of 70 and mentation. Left Pleural fluid analysis resulted as transudative per  lights criteria. Tablo today per nephrology. ID managing with empiric coverage. Source control may not have been met. Podiatry, wound, and surgery to evaluate multiple wounds over the body.      1/23: Pt received dialysis via tablo yesterday. Very similar status as yesterday from lethargic states to well mentation. Patient remains on Levophed still at 0.36 with empiric coverage with vanc + zosyn & doxy. Increase in patients total bilirubin today 0.9-1.6. Consulting services include: Wound, Vascular, Podiatry, Acute surgery, Palliative, Infectious Disease, and Nephrology.     Medical History:  Past Medical History:   Diagnosis Date    Asthma     CAD (coronary artery disease)     CHF (congestive heart failure) (CMS/[facility])     Chronic kidney disease on chronic dialysis (CMS/[facility])     Hypertension     Personal history of diseases of the blood and blood-forming organs and certain disorders involving the immune mechanism     History of autoimmune disorder    Sleep apnea     Type 2 diabetes mellitus without complications (CMS/[facility]) 09/15/2022    Diabetes mellitus     Past Surgical History:   Procedure Laterality Date    CARPAL TUNNEL RELEASE  11/18/2013    Neuroplasty Decompression Median Nerve At Carpal Tunnel    HAND SURGERY  11/18/2013    Hand Surgery                                                                                                                                                          MASTECTOMY, PARTIAL  04/10/2014    Right Breast Partial Mastectomy    MR HEAD ANGIO WO IV CONTRAST  8/29/2023    MR HEAD ANGIO WO IV CONTRAST [facility]    OTHER SURGICAL HISTORY  11/18/2013    Breast Reconstruction With Implant Prosthesis    OTHER SURGICAL HISTORY  11/18/2013    Thyroid Surgery Substernal Thyroidectomy Partial    OTHER SURGICAL HISTORY  11/18/2013    Modified Radical Mastectomy Left Breast    OTHER SURGICAL HISTORY  04/04/2022    Carpal tunnel surgery    OTHER SURGICAL HISTORY  04/04/2022    Foot  surgery    OTHER SURGICAL HISTORY  04/04/2022    Hernia repair    SENTINEL LYMPH NODE BIOPSY  04/10/2014    [place] Lymph Node Biopsy    TONSILLECTOMY  11/18/2013    Tonsillectomy    UMBILICAL HERNIA REPAIR  11/18/2013    Umbilical Hernia Repair    US GUIDED PERCUTANEOUS PLACEMENT  6/29/2023    US GUIDED PERCUTANEOUS PLACEMENT [facility]     Medications Prior to Admission   Medication Sig Dispense Refill Last Dose    apixaban (Eliquis) 2.5 mg tablet Take 1 tablet (2.5 mg) by mouth 2 times a day.       blood sugar diagnostic strip 1 x daily e11.65 for 90 days       blood-glucose sensor (Dexcom G6 Sensor) device Check blood sugar when needed and as instructed 6 each 3     doxycycline (Vibramycin) 100 mg capsule Take 1 capsule (100 mg) by mouth once daily. Take with at least 8 ounces (large glass) of water, do not lie down for 30 minutes after Do not start before January 6, 2024.       escitalopram (Lexapro) 10 mg tablet 1 tablet Orally Once a day for 30 day(s)       febuxostat (Uloric) 40 mg tablet TAKE ONE TABLET BY MOUTH EVERY OTHER DAY       fenofibrate (Triglide) 160 mg tablet Take 1 tablet (160 mg) by mouth once daily.       gabapentin (Neurontin) 100 mg capsule Take 1 capsule (100 mg) by mouth 2 times a day.       insulin lispro (HumaLOG) 100 unit/mL injection Inject 0-0.1 mL (0-10 Units) under the skin 3 times a day with meals. Take as directed per insulin instructions.       midodrine (Proamatine) 5 mg tablet Take 3 tablets (15 mg) by mouth 3 times a day with meals.       nystatin (Mycostatin) 100,000 unit/gram powder APPLY 1 GRAM TO SKIN TWO TIMES A DAY 30 g 0     oxyCODONE-acetaminophen (Percocet) 5-325 mg tablet Take 1 tablet by mouth every 12 hours if needed for severe pain (7 - 10). 120 tablet 0     simvastatin (Zocor) 20 mg tablet TAKE 1 TABLET BY MOUTH DAILY 60 tablet 5      Patient has no known allergies.  Social History     Tobacco Use    Smoking status: Never    Smokeless tobacco: Never    Substance Use Topics    Alcohol use: Not Currently    Drug use: Never     Family History   Problem Relation Name Age of Onset    Lymphoma Mother      Prostate cancer Father          passed away [address] Medications:    norepinephrine, 0.01-3 mcg/kg/min, Last Rate: 0.36 mcg/kg/min (01/23/24 1100)          Current Facility-Administered Medications:     acetaminophen (Tylenol) tablet 650 mg, 650 mg, oral, TID, Yusuf De La Garza PA-C, 650 mg at 01/23/24 0846    apixaban (Eliquis) tablet 2.5 mg, 2.5 mg, oral, BID, Judd Tavera DO, 2.5 mg at 01/23/24 0846    dextrose 10 % in water (D10W) infusion, 0.3 g/kg/hr, intravenous, Once PRN, Judd Tavera DO    dextrose 50 % injection 25 g, 25 g, intravenous, q15 min PRN, Judd Tavera DO    doxycycline (Vibramycin) capsule 100 mg, 100 mg, oral, Daily, Yusuf De La Garza PA-C    [Held by provider] doxycycline (Vibramycin) in dextrose 5 % in water (D5W) 100 mL  mg, 100 mg, intravenous, q24h, Yusuf De La Garza PA-C, Stopped at 01/22/24 1609    escitalopram (Lexapro) tablet 10 mg, 10 mg, oral, Nightly, Judd Tavera DO, 10 mg at 01/22/24 2054    gabapentin (Neurontin) capsule 100 mg, 100 mg, oral, BID, Judd Tavera DO, 100 mg at 01/19/24 2127    glucagon (Glucagen) injection 1 mg, 1 mg, intramuscular, q15 min PRN, Judd Tavera DO    heparin 1,000 unit/mL injection 2,000 Units, 2,000 Units, intra-catheter, After Dialysis, Saeed Mondragon MD, 1,600 Units at 01/22/24 1822    heparin 1,000 unit/mL injection 2,000 Units, 2,000 Units, intra-catheter, After Dialysis, Saeed Mondragon MD, 1,600 Units at 01/22/24 1820    insulin lispro (HumaLOG) injection 0-10 Units, 0-10 Units, subcutaneous, TID with meals, Judd Tavera DO, 2 Units at 01/21/24 1620    ipratropium-albuteroL (Duo-Neb) 0.5-2.5 mg/3 mL nebulizer solution 3 mL, 3 mL, nebulization, q6h, Yusuf De La Garza PA-C, 3 mL at 01/23/24 0827    midodrine  (Proamatine) tablet 15 mg, 15 mg, oral, TID with meals, Judd Tavera DO, 15 mg at 01/23/24 1248    norepinephrine (Levophed) 8 mg in dextrose 5% 250 mL (0.032 mg/mL) infusion (premix), 0.01-3 mcg/kg/min, intravenous, Continuous, Yusuf De La Garza PA-C, Last Rate: 50.2 mL/hr at 01/23/24 1100, 0.36 mcg/kg/min at 01/23/24 1100    nystatin (Mycostatin) 100,000 unit/gram powder, , Topical, BID, Judd Tavera DO, Given at 01/23/24 0930    nystatin (Mycostatin) cream, , Topical, BID, LILLIE Blair, 1 Application at 01/23/24 0930    oxyCODONE-acetaminophen (Percocet) 5-325 mg per tablet 1 tablet, 1 tablet, oral, q8h PRN, Judd Tavera DO, 1 tablet at 01/19/24 2130    oxygen (O2) therapy, , inhalation, Continuous PRN - O2/gases, Judd Tavera DO, 4 L/min at 01/23/24 0800    pantoprazole (ProtoNix) EC tablet 40 mg, 40 mg, oral, Daily, 40 mg at 01/23/24 0846 **OR** pantoprazole (ProtoNix) injection 40 mg, 40 mg, intravenous, Daily, Yusuf De La Garza PA-C, 40 mg at 01/21/24 0824    perflutren lipid microspheres (Definity) injection 0.5-10 mL of dilution, 0.5-10 mL of dilution, intravenous, Once in imaging, LILLIE Mccray    perflutren protein A microsphere (Optison) injection 0.5 mL, 0.5 mL, intravenous, Once in imaging, LILLIE Mccray    piperacillin-tazobactam-dextrose (Zosyn) IV 2.25 g, 2.25 g, intravenous, q8h, Judd Tavera DO, Last Rate: 100 mL/hr at 01/23/24 1255, 2.25 g at 01/23/24 1255    sennosides-docusate sodium (Judy-Colace) 8.6-50 mg per tablet 1 tablet, 1 tablet, oral, Nightly PRN, Yusuf De La Garza PA-C    sodium chloride 3 % nebulizer solution 3 mL, 3 mL, nebulization, q6h GULSHAN, Yusuf De La Garza PA-C, 3 mL at 01/23/24 0827    sulfur hexafluoride microsphr (Lumason) injection 24.28 mg, 2 mL, intravenous, Once in imaging, Ronald Casey APRN-CNP    vancomycin (Vancocin) capsule 125 mg, 125 mg, oral, 4x daily, Judd Tavera DO, 125 mg at 01/23/24 1255     vancomycin (Vancocin) placeholder, , miscellaneous, Daily PRN, Kendall Alfonso, PharmD    zinc oxide 20 % ointment 1 Application, 1 Application, Topical, BID, Sharmilakarie Gallego, APRN-CNP, 1 Application at 01/23/24 0930    Review of Systems:  14 point review of systems was completed and negative except for those specially mention in my HPI    Physical Exam:    Heart Rate:  []   Temp:  [35.7 °C (96.3 °F)-36.3 °C (97.3 °F)]   Resp:  [12-28]   Weight:  [77.8 kg (171 lb 8.3 oz)-79 kg (174 lb 2.6 oz)]   SpO2:  [89 %-99 %]     Physical Exam  Vitals and nursing note reviewed.   Constitutional:       Appearance: She is obese. She is ill-appearing.   HENT:      Head: Normocephalic and atraumatic.      Mouth/Throat:      Mouth: Mucous membranes are dry.      Pharynx: Oropharynx is clear. No oropharyngeal exudate.   Eyes:      General: No scleral icterus.     Extraocular Movements: Extraocular movements intact.      Conjunctiva/sclera: Conjunctivae normal.      Pupils: Pupils are equal, round, and reactive to light.   Neck:      Comments: Various skin break down on lower extremities with different levels  Sacral pressure ulcer stage 2   Cardiovascular:      Rate and Rhythm: Normal rate and regular rhythm.      Heart sounds: No murmur heard.  Pulmonary:      Effort: Pulmonary effort is normal.      Breath sounds: Wheezing and rhonchi present.   Abdominal:      General: Abdomen is flat and protuberant. Bowel sounds are normal. There is no distension.      Tenderness: There is no abdominal tenderness. There is no guarding or rebound.   Musculoskeletal:         General: Swelling, deformity and signs of injury present.      Cervical back: No tenderness.      Right lower leg: Edema present.      Left lower leg: Edema present.   Skin:     General: Skin is warm.      Capillary Refill: Capillary refill takes 2 to 3 seconds.      Coloration: Skin is not jaundiced.      Findings: Bruising, erythema, lesion and rash present.    Neurological:      Mental Status: Mental status is at baseline. She is lethargic.      Cranial Nerves: No cranial nerve deficit.      Motor: Weakness present.         Objective:    I have reviewed all medications, laboratory results, and imaging pertinent for today's encounter.           Intake/Output Summary (Last 24 hours) at 1/23/2024 1307  Last data filed at 1/23/2024 1255  Gross per 24 hour   Intake 1710.42 ml   Output 100 ml   Net 1610.42 ml         Assessment/Plan:     I am currently managing this critically ill patient for the following problems:  Septic Shock   ESRD   Chronic Hypotension   Recurrent Pleural Effusions   Vasculopathy   Multiple Wounds with and with out infections  C. Diff infection     Plan:  Neuro:  AMS   -Pain Control: Tylenol for mild, Percocet for moderate   -CAM Assessment, Neuro checks every shift   -Palliative consulted   -restart Lexapro and gabapentin  -CT head No acute finding      CV:   Hx of endocarditis   Septic Shock  Chronic Hypotension  Vasculopathy   -Levophed gtt titrate to maintain SBP > 70  -Home midodrine 15 mg TID when mental status is appropriate   -Elevated troponins 2/2 shock state   -Continuous cardiac monitoring     Resp:  Ct chest showed multiple nodules on lung in August 23   Moderate to large left sided pleural effusion   -Thoracentesis 1/20 over 1L of hazy straw colored fluid, Lights criteria suggest Transudative    -SP02 >92%, Nasal canula titration   -CT C/A/P A left lower lobe pneumonia is identified. There is extensive airspace consolidation  -continuous pulse ox  -Pulm hygiene and Acapella      GI:   -Renal Low phos Low K assess mental status before feeding   -Flexacil for stools   -BR with senna/colace PRN   -Protonix daily      :  ESRD   -Nephrology consulted for dialysis order, High dose of pressors required, Appreciate nephrology consult   -Elevated Lactate 2/2 hypoperfusion   -Avoid Large amounts of fluids   -Daily BMP, Replete electrolytes as  indicated for ESRD    -Continue Uloric for gout prophylaxis      Endo:  DM2   -SSI Q4h      Heme:   Anemia of chronic disease   -monitor for s/s anemia  -transfuse for hgb <7  -daily CBC     MSK:   Multiple Wounds with and with out infections  -Surgery consulted   -Wound care consulted for dressing other wounds   -Podiatry consulted  -XR Left ankle not suspicious for osteomyelitis   -XR Right ankle and tib/fib not suspicious for osteomyelitis   -padded pressure points  -ICU skin protocol     ID:  Hx of MRSA bacteremia with endocarditis  Multiple Wounds with and with out infections  C. Diff infection  -WBC 22.9 -> 21.0  -> 21.2 -> 16.5 Vanc + Zoysn, Possible inadequate source control, consider biopsy of wounds by ID   -Per ID add doxycycline 100mg every 24 hours   -blood cultures No growth day 3  -urine cultures grew Proteus   -Pro-maría 2.12  -ID Consulted, Appreciate ID consult  -C. Diff & MRSA PCR negative      Ethics/Code Status:  Status was changed to Full code by daughter Meka Bills who has supplied documentation that she is medical power of       :  DVT Prophylaxis: Heparin   GI Prophylaxis: Protonix  Bowel Regimen: Senna   Diet: Renal  CVC: right internal jugular 1/20  [place]: right radial 1/20  Amos: none   Restraints: None  Dispo: ICU      This note was prepared using voice recognition software. The details of this note are correct and have been reviewed, and corrected to the best of my ability. Some grammatical areas may persist related to the Dragon software.      I have reviewed all medications, laboratory results, and imaging pertinent for today's encounter.  Plan Discussed with Dr. Grossman  Critical Care Time: 55 minutes  Critical Care [facility]   Yusuf De La Garza PA-C

## 2024-01-23 NOTE — PROGRESS NOTES
"Ayanna Gross is a 73 y.o. female on day 4 of admission presenting with Septic shock (CMS/HCC).    Subjective   Awake and alert but confused.  Answers some questions, then answers others inappropriately.        Objective     Last Recorded Vitals  Blood pressure 85/65, pulse (!) 117, temperature 36.1 °C (97 °F), temperature source Temporal, resp. rate 18, height 1.575 m (5' 2\"), weight 79 kg (174 lb 2.6 oz), SpO2 90 %.  Intake/Output last 3 Shifts:  I/O last 3 completed shifts:  In: 2794.4 (35.4 mL/kg) [P.O.:500; I.V.:1694.4 (21.4 mL/kg); Blood:150; IV Piggyback:450]  Out: 500 (6.3 mL/kg) [Stool:500]  Weight: 79 kg     Physical Exam:  General:   frail-appearing elderly female, lying in bed, no acute distress, appears older than stated age.  HEENT:  NCAT, perrl, sclerae anicteric, oral mm moist, no lesions noted.   Neck:    Neck is supple, no palpable adenopathy appreciated.  No JVD or carotid bruits noted.   Pulmonary:   Diminished throughout at present.  Wearing oxygen per nc.  Respirations even and unlabored.    Cardiovascular: regular at present, unable to appreciate murmurs or rub at this time.  No JVD.  1+ bilateral lower extremity edema,  right tunneled hd cath intact, site dressed.      Abdomen:   Abdomen soft, no guarding or grimace with light palpation.  Bowel sounds are present.  :  deferred.   Musculoskeletal:  fingers  and toes with areas of amputation and scabbing.  Hands and feet warm to touch.  No clubbing or cyanosis noted.  Moves all extremities equally and on command.    Strength 3+ throughout.    Neurologic:  Alert and oriented to self at present.   Pleasantly confused.  Follows some commands.    Skin:  skin is thin and frail,  numerous areas of bruising  and ecchymosis on her extremities.  Dressings to heels.  Reported dressing to sacral region but not examined due to positioning.   Psychiatric:  pleasantly confused, cooperative with exam, calm.      Relevant Results  Results for orders placed " or performed during the hospital encounter of 01/19/24 (from the past 24 hour(s))   POCT GLUCOSE   Result Value Ref Range    POCT Glucose 121 (H) 74 - 99 mg/dL   Sedimentation rate, automated   Result Value Ref Range    Sedimentation Rate 49 (H) 0 - 30 mm/h   POCT GLUCOSE   Result Value Ref Range    POCT Glucose 128 (H) 74 - 99 mg/dL   Transthoracic Echo (TTE) Complete   Result Value Ref Range    AV pk david 0.84 m/s    LVOT diam 1.90 cm    MV E/A ratio 1.32     LVIDd 3.66 cm    AV pk grad 2.8 mmHg    Aortic Valve Area by Continuity of Peak Velocity 1.57 cm2    LV A4C EF 68.3    POCT GLUCOSE   Result Value Ref Range    POCT Glucose 145 (H) 74 - 99 mg/dL   CBC and Auto Differential   Result Value Ref Range    WBC 16.5 (H) 4.4 - 11.3 x10*3/uL    nRBC 0.2 (H) 0.0 - 0.0 /100 WBCs    RBC 3.62 (L) 4.00 - 5.20 x10*6/uL    Hemoglobin 10.7 (L) 12.0 - 16.0 g/dL    Hematocrit 32.4 (L) 36.0 - 46.0 %    MCV 90 80 - 100 fL    MCH 29.6 26.0 - 34.0 pg    MCHC 33.0 32.0 - 36.0 g/dL    RDW 24.0 (H) 11.5 - 14.5 %    Platelets 233 150 - 450 x10*3/uL    Neutrophils % 74.9 40.0 - 80.0 %    Immature Granulocytes %, Automated 0.7 0.0 - 0.9 %    Lymphocytes % 14.4 13.0 - 44.0 %    Monocytes % 6.6 2.0 - 10.0 %    Eosinophils % 3.0 0.0 - 6.0 %    Basophils % 0.4 0.0 - 2.0 %    Neutrophils Absolute 12.32 (H) 1.60 - 5.50 x10*3/uL    Immature Granulocytes Absolute, Automated 0.12 0.00 - 0.50 x10*3/uL    Lymphocytes Absolute 2.37 0.80 - 3.00 x10*3/uL    Monocytes Absolute 1.08 (H) 0.05 - 0.80 x10*3/uL    Eosinophils Absolute 0.50 (H) 0.00 - 0.40 x10*3/uL    Basophils Absolute 0.07 0.00 - 0.10 x10*3/uL   Comprehensive metabolic panel   Result Value Ref Range    Glucose 124 (H) 65 - 99 mg/dL    Sodium 128 (L) 133 - 145 mmol/L    Potassium 3.9 3.4 - 5.1 mmol/L    Chloride 93 (L) 97 - 107 mmol/L    Bicarbonate 22 (L) 24 - 31 mmol/L    Urea Nitrogen 15 8 - 25 mg/dL    Creatinine 2.60 (H) 0.40 - 1.60 mg/dL    eGFR 19 (L) >60 mL/min/1.73m*2    Calcium 8.0  (L) 8.5 - 10.4 mg/dL    Albumin 2.6 (L) 3.5 - 5.0 g/dL    Alkaline Phosphatase 119 35 - 125 U/L    Total Protein 5.1 (L) 5.9 - 7.9 g/dL    AST 16 5 - 40 U/L    Bilirubin, Total 1.6 (H) 0.1 - 1.2 mg/dL    ALT 10 5 - 40 U/L    Anion Gap 13 <=19 mmol/L   Magnesium   Result Value Ref Range    Magnesium 1.70 1.60 - 3.10 mg/dL   Phosphorus   Result Value Ref Range    Phosphorus 2.3 (L) 2.5 - 4.5 mg/dL   Morphology   Result Value Ref Range    RBC Morphology See Below     Target Cells Few     Ovalocytes Few     Lincoln Cells Few      XR tibia fibula right 2 views  Result Date: 1/22/2024  No evidence for acute osseous abnormality. No bony erosion to suggest osteomyelitis.      XR ankle right 3+ views  Result Date: 1/22/2024  No evidence for acute osseous abnormality. No bony erosion to suggest osteomyelitis.   Signed by: Real Mendieta 1/22/2024 3:14 PM Dictation workstation:   [id number]    Transthoracic Echo (TTE) Complete  Result Date: 1/22/2024  CONCLUSIONS:  1. Left ventricular systolic function is normal with a 70% estimated ejection fraction.  2. The left atrium is moderately dilated.  3. Left ventricular diastolic filling indeterminate.     XR foot left 3+ views  Result Date: 1/21/2024  No evidence for acute fracture or acute bony erosion to suggest osteomyelitis. Chronic changes as described.       CT chest abdomen pelvis wo IV contrast  Result Date: 1/21/2024  Moderately small bilateral pleural effusions with left upper lobe atelectasis and compressive atelectasis seen posteriorly in right lower lobe. A left lower lobe pneumonia is identified. There is extensive airspace consolidation within left lower lobe with air bronchograms observed. Mild mediastinal reactive adenopathy is present as well.   Cholelithiasis without evidence of cholecystitis. Small amount of ascites with mild presacral edema and anasarca within the soft tissues of the abdominal wall. Renal atrophy with extensive vascular calcifications.          CT head wo IV contrast  Result Date: 1/21/2024  Atrophy and chronic microvascular ischemic disease without acute intracranial process.       Scheduled medications  acetaminophen, 650 mg, oral, TID  apixaban, 2.5 mg, oral, BID  doxycycline, 100 mg, intravenous, q24h  escitalopram, 10 mg, oral, Nightly  [Held by provider] gabapentin, 100 mg, oral, BID  heparin, 2,000 Units, intra-catheter, After Dialysis  heparin, 2,000 Units, intra-catheter, After Dialysis  insulin lispro, 0-10 Units, subcutaneous, TID with meals  ipratropium-albuteroL, 3 mL, nebulization, q6h  midodrine, 15 mg, oral, TID with meals  nystatin, , Topical, BID  nystatin, , Topical, BID  pantoprazole, 40 mg, oral, Daily   Or  pantoprazole, 40 mg, intravenous, Daily  perflutren lipid microspheres, 0.5-10 mL of dilution, intravenous, Once in imaging  perflutren protein A microsphere, 0.5 mL, intravenous, Once in imaging  piperacillin-tazobactam, 2.25 g, intravenous, q8h  sodium chloride, 3 mL, nebulization, q6h GULSHAN  sulfur hexafluoride microsphr, 2 mL, intravenous, Once in imaging  vancomycin, 125 mg, oral, 4x daily  zinc oxide, 1 Application, Topical, BID    Continuous medications  norepinephrine, 0.01-3 mcg/kg/min, Last Rate: 0.36 mcg/kg/min (01/23/24 0949)    PRN medications  PRN medications: dextrose 10 % in water (D10W), dextrose, glucagon, oxyCODONE-acetaminophen, oxygen, sennosides-docusate sodium, vancomycin    Assessment/Plan     Acute respiratory failure/pleural effusion  -bedside thora L side 1/20, 1L out, cultures/cytology pending  -6L currently  -CT chest with persistent effusions, atelectasis, reactive LAD, possible infiltrate    Sepsis/septic shock  -increased levophed requirements  -MRSA Bacteremia Hx with vegetation, on suppressive doxycycline  -ID has patient on Zosyn, Doxy, PO Vanco for cdiff    UTI-culture pending    Decubitus Ulcers-overall worsened appearance, suspect vascular component, concern for possible OM. Very poor  surgical candidate.    DHF-no echo since 10/2023    Severe protein calorie Malnutrition  -likely long-term, Prealbumin <3. +dysphagia.  Working with speech    ESRD on HD  -mgmt per nephrology.  Planning tablo today.   -chronic hypotension    PAD  -CT imaging suggestion widespread calcifications and atherosclerosis from head to toes, now with worsened wounds to B/L LE    Palliative Care  Code status:  Full code  The patient is not a capable decision maker at present.  She has formal HPOA documentation naming her daughter, Meka as hpoa.    Goals of care discussions conducted with family over last couple days.  Goal is to treat the patient's acute issues so she may retur to an improved level of function and to spend meaningful time with grandchildren.   Daughters are aware of the patient's critical illness and guarded prognosis.  We did inform the daughters that our recommendation would be for code status change, since the goal of returning home included cognitive function.  They requested she remain full code.     The patient is in disease modifying model of care with aggressive medical treatment and symptom control.  Goals of care established.  Will follow with you intermittently or as needed.    I spent 35 minutes in the professional and overall care of this patient.      Mee Doe, APRN-CNP

## 2024-01-23 NOTE — CARE PLAN
Problem: Pain  Goal: Participates in PT with improved pain control throughout the shift  Outcome: Not Progressing     Problem: Pain  Goal: My pain/discomfort is manageable  Outcome: Progressing     Problem: Safety  Goal: Patient will be injury free during hospitalization  Outcome: Progressing  Goal: I will remain free of falls  Outcome: Progressing     Problem: Daily Care  Goal: Daily care needs are met  Outcome: Progressing     Problem: Psychosocial Needs  Goal: Demonstrates ability to cope with hospitalization/illness  Outcome: Progressing  Goal: Collaborate with me, my family, and caregiver to identify my specific goals  Outcome: Progressing     Problem: Discharge Barriers  Goal: My discharge needs are met  Outcome: Progressing     Problem: Fall/Injury  Goal: Not fall by end of shift  Outcome: Progressing  Goal: Be free from injury by end of the shift  Outcome: Progressing  Goal: Verbalize understanding of personal risk factors for fall in the hospital  Outcome: Progressing  Goal: Verbalize understanding of risk factor reduction measures to prevent injury from fall in the home  Outcome: Progressing  Goal: Pace activities to prevent fatigue by end of the shift  Outcome: Progressing     Problem: Skin  Goal: Decreased wound size/increased tissue granulation at next dressing change  Outcome: Progressing  Goal: Participates in plan/prevention/treatment measures  Outcome: Progressing  Goal: Prevent/manage excess moisture  Outcome: Progressing  Goal: Prevent/minimize sheer/friction injuries  Outcome: Progressing  Goal: Promote/optimize nutrition  Outcome: Progressing  Goal: Promote skin healing  Outcome: Progressing     Problem: Pain  Goal: Takes deep breaths with improved pain control throughout the shift  Outcome: Progressing  Goal: Turns in bed with improved pain control throughout the shift  Outcome: Progressing  Goal: Walks with improved pain control throughout the shift  Outcome: Progressing  Goal: Performs  ADL's with improved pain control throughout shift  Outcome: Progressing  Goal: Free from opioid side effects throughout the shift  Outcome: Progressing  Goal: Free from acute confusion related to pain meds throughout the shift  Outcome: Progressing     Problem: Respiratory  Goal: Clear secretions with interventions this shift  Outcome: Progressing  Goal: Minimize anxiety/maximize coping throughout shift  Outcome: Progressing  Goal: Minimal/no exertional discomfort or dyspnea this shift  Outcome: Progressing  Goal: No signs of respiratory distress (eg. Use of accessory muscles. Peds grunting)  Outcome: Progressing  Goal: Patent airway maintained this shift  Outcome: Progressing  Goal: Tolerate pulmonary toileting this shift  Outcome: Progressing  Goal: Verbalize decreased shortness of breath this shift  Outcome: Progressing  Goal: Wean oxygen to maintain O2 saturation per order/standard this shift  Outcome: Progressing  Goal: Increase self care and/or family involvement in next 24 hours  Outcome: Progressing   The patient's goals for the shift include unable to state - confused    The clinical goals for the shift include remain hemodynamically stable - titrated levophed as tolerated    Over the shift, the patient did not make progress toward the following goals. Barriers to progression include:  patient on bedrest due to hypotension, no PT this shift, will reassess.    Problem: Pain  Goal: Participates in PT with improved pain control throughout the shift  Outcome: Not Progressing

## 2024-01-23 NOTE — CONSULTS
Wound Care Consult     Visit Date: 1/23/2024      Patient Name: Ayanna Gross         MRN: [id number]           YOB: 1950     Reason for Consult: buttock and bilateral heel wounds        Wound History: Present on admission. Patient with Moisture Associated Skin Damage to Sacrum, Chronic Unstagable ulcers to bilateral heels and left posterior ankle, Deep tissue injury to Left great and 3rd toe. Patient was recently discharged from hospital to rehab on 1/5/24 after 30days.       A 73 y.o. year old female admitted for Principal Problem:    Septic shock (CMS/[facility])  Active Problems:    End stage renal disease (CMS/[facility])    Anemia due to blood loss, acute      Past Medical History:   Diagnosis Date    Asthma     CAD (coronary artery disease)     CHF (congestive heart failure) (CMS/[facility])     Chronic kidney disease on chronic dialysis (CMS/[facility])     Hypertension     Personal history of diseases of the blood and blood-forming organs and certain disorders involving the immune mechanism     History of autoimmune disorder    Sleep apnea     Type 2 diabetes mellitus without complications (CMS/[facility]) 09/15/2022    Diabetes mellitus      Past Surgical History:   Procedure Laterality Date    CARPAL TUNNEL RELEASE  11/18/2013    Neuroplasty Decompression Median Nerve At Carpal Tunnel    HAND SURGERY  11/18/2013    Hand Surgery                                                                                                                                                          MASTECTOMY, PARTIAL  04/10/2014    Right Breast Partial Mastectomy    MR HEAD ANGIO WO IV CONTRAST  8/29/2023    MR HEAD ANGIO WO IV CONTRAST [facility]    OTHER SURGICAL HISTORY  11/18/2013    Breast Reconstruction With Implant Prosthesis    OTHER SURGICAL HISTORY  11/18/2013    Thyroid Surgery Substernal Thyroidectomy Partial    OTHER SURGICAL HISTORY  11/18/2013    Modified Radical Mastectomy Left Breast    OTHER SURGICAL HISTORY   04/04/2022    Carpal tunnel surgery    OTHER SURGICAL HISTORY  04/04/2022    Foot surgery    OTHER SURGICAL HISTORY  04/04/2022    Hernia repair    SENTINEL LYMPH NODE BIOPSY  04/10/2014    [place] Lymph Node Biopsy    TONSILLECTOMY  11/18/2013    Tonsillectomy    UMBILICAL HERNIA REPAIR  11/18/2013    Umbilical Hernia Repair    US GUIDED PERCUTANEOUS PLACEMENT  6/29/2023    US GUIDED PERCUTANEOUS PLACEMENT [facility]       Scheduled medications  acetaminophen, 650 mg, oral, TID  apixaban, 2.5 mg, oral, BID  doxycylcine, 100 mg, oral, Daily  [Held by provider] doxycycline, 100 mg, intravenous, q24h  escitalopram, 10 mg, oral, Nightly  [Held by provider] gabapentin, 100 mg, oral, BID  heparin, 2,000 Units, intra-catheter, After Dialysis  heparin, 2,000 Units, intra-catheter, After Dialysis  insulin lispro, 0-10 Units, subcutaneous, TID with meals  ipratropium-albuteroL, 3 mL, nebulization, q6h  midodrine, 15 mg, oral, TID with meals  nystatin, , Topical, BID  nystatin, , Topical, BID  pantoprazole, 40 mg, oral, Daily   Or  pantoprazole, 40 mg, intravenous, Daily  perflutren lipid microspheres, 0.5-10 mL of dilution, intravenous, Once in imaging  perflutren protein A microsphere, 0.5 mL, intravenous, Once in imaging  piperacillin-tazobactam, 2.25 g, intravenous, q8h  sodium chloride, 3 mL, nebulization, q6h GULSHAN  sulfur hexafluoride microsphr, 2 mL, intravenous, Once in imaging  vancomycin, 125 mg, oral, 4x daily  zinc oxide, 1 Application, Topical, BID      Continuous medications  norepinephrine, 0.01-3 mcg/kg/min, Last Rate: 0.36 mcg/kg/min (01/23/24 1100)      PRN medications  PRN medications: dextrose 10 % in water (D10W), dextrose, glucagon, oxyCODONE-acetaminophen, oxygen, sennosides-docusate sodium, vancomycin    No Known Allergies      Pertinent Labs:   [place], Urine   Date Value Ref Range Status   02/24/2019 1,315 (H) 0 - 23 MG/L Final     Comment:     RESULT CHECKED     Albumin   Date Value Ref  Range Status   01/23/2024 2.6 (L) 3.5 - 5.0 g/dL Final   01/02/2024 2.8 (L) 3.4 - 5.0 g/dL Final     Albumin, SPE   Date Value Ref Range Status   02/26/2019 3.22  Reference range: 3.37 to 4.23  Unit: gm/dL   (L)  Final     Albumin/Protein Total, Ur   Date Value Ref Range Status   02/24/2019 77.5  Unit: %    Final       Wound Assessment:  Wound 12/06/23 Diabetic Ulcer Heel Left (Active)   Wound Image   01/23/24 0934   Site Assessment Black;Necrotic;Eschar 01/23/24 0934   Judy-Wound Assessment Clean;Dry;Intact;Fragile 01/23/24 0934   Wound Length (cm) 5.6 cm 01/23/24 0934   Wound Width (cm) 5.2 cm 01/23/24 0934   Wound Surface Area (cm^2) 29.12 cm^2 01/23/24 0934   State of Healing Non-healing;Eschar 01/23/24 0934   Drainage Description None 01/23/24 0934   Drainage Amount None 01/23/24 0934   Dressing ABD;Kerlix/rolled gauze 01/23/24 0934   Dressing Changed Changed 01/23/24 0934   Dressing Status Clean;Dry 01/23/24 0934   Dressing Status Clean;Intact;Dry 01/22/24 2000       Wound 12/06/23 Diabetic Ulcer Heel Right (Active)   Wound Image   01/23/24 0946   Site Assessment Black;Eschar;Granulation;Necrotic 01/23/24 0946   Judy-Wound Assessment Red 01/23/24 0946   Non-staged Wound Description Full thickness 01/23/24 0946   Wound Length (cm) 6.5 cm 01/23/24 0946   Wound Width (cm) 3.5 cm 01/23/24 0946   Wound Surface Area (cm^2) 22.75 cm^2 01/23/24 0946   Wound Depth (cm) 0.1 cm 01/23/24 0946   Wound Volume (cm^3) 2.275 cm^3 01/23/24 0946   State of Healing Non-healing;Eschar 01/23/24 0946   Drainage Description Serous 01/23/24 0946   Drainage Amount Small 01/23/24 0946   Dressing ABD;Kerlix/rolled gauze 01/23/24 0946   Dressing Changed Changed 01/23/24 0946   Dressing Status Clean;Dry 01/23/24 0946   Dressing Status Clean;Dry;Intact 01/22/24 2000       Wound 12/06/23 Pressure Injury Buttocks Bilateral (Active)   Wound Image   01/23/24 0922   Site Assessment Maceration;Red 01/23/24 0922   Judy-Wound Assessment  Clean;Dry;Intact 01/23/24 0922   Non-staged Wound Description Partial thickness 01/23/24 0922   Drainage Description Serous 01/23/24 0922   Drainage Amount Moderate 01/23/24 0922   Dressing Foam;Silicone border dressing 01/23/24 0922   Dressing Changed Changed 01/23/24 0922   Dressing Status Clean;Dry 01/23/24 0922   Dressing Status Clean;Dry;Intact 01/22/24 2000       Wound 12/06/23 Other (comment) Groin Bilateral (Active)   Site Assessment Pink;Red 01/23/24 0946   Judy-Wound Assessment Clean;Dry;Intact 01/23/24 0946   Drainage Description None 01/23/24 0946   Drainage Amount None 01/23/24 0946   Dressing Open to air 01/23/24 0946   Dressing Changed Changed 01/22/24 0400       Wound 01/20/24 Skin Tear Pretibial Right (Active)   Wound Image   01/20/24 2000   Site Assessment Dry;Black 01/23/24 0400   Judy-Wound Assessment Clean;Dry;Intact 01/23/24 0934   Margins Well-defined edges 01/23/24 0934   Drainage Description None 01/23/24 0934   Drainage Amount None 01/23/24 0934   Dressing Open to air 01/23/24 0934       Wound 01/21/24 Pressure Injury Toe (Comment  which one) Left (Active)   Wound Image   01/23/24 0946   Site Assessment [place] 01/23/24 0946   Judy-Wound Assessment Blanchable erythema 01/23/24 0946   Pressure Injury Stage DTPI 01/23/24 0946   Drainage Description None 01/23/24 0946   Drainage Amount None 01/23/24 0946   Dressing Open to air 01/23/24 0946       Wound 01/19/24 Diabetic Ulcer Ankle Dorsal;Left (Active)   Site Assessment Black;Eschar;Necrotic 01/23/24 0934   Judy-Wound Assessment Fragile;Red 01/23/24 0934   Wound Length (cm) 4.5 cm 01/23/24 0934   Wound Width (cm) 2 cm 01/23/24 0934   Wound Surface Area (cm^2) 9 cm^2 01/23/24 0934   State of Healing Non-healing;Eschar 01/23/24 0934   Drainage Description Serous 01/23/24 0934   Drainage Amount Moderate 01/23/24 0934   Dressing ABD;Kerlix/rolled gauze 01/23/24 0934   Dressing Changed Changed 01/23/24 0934   Dressing Status Clean;Dry 01/23/24  0934       Wound Team Summary Assessment:     Exam conducted on day  3 of stay with knowledge of Floor Nurse. Introductions made to patient, alert oriented to self. On exam patient lying fowlers in bed, Fecal management system and TruVue boots in place. Patient anuric due to dialysis. Redness to Vaginal and inner thigh area due to moisture and yeast, Nystatin being applied. Left groin with minimal redness. With assist of Nurse and Nickolas Gallego CNP patient turned to left side. Sacral border removed, large drainage. Patient has large area of MASD to Sacrum and upper buttock. Cleansed with soap and water, Triad and Nystatin applied, covered with border dressing. Heels dressings removed, serous drainage through left dressing. Patient has large areas of black eschar to bilateral heels and left posterior ankle. Left ankle area soft, Right heel with some granulation and drainage. Heels painted with betadine. Podiatry arrived at end of visit. Continue painting with betadine daily. Podiatry may debride left ankle on 1/24. Vascular still needs to assess patient. DTI to Left great toe and left 3rd to knuckle. Will continue to monitor. Progressa bed system with waffle mattress overay. Recommend ATR+ and Envision E700 low airloss mattress. Peri Martin RN updated, to continue pressure injury prevention interventions, Woundcare, Envision E700, and nursing to continue to follow providers orders. Reconsult Wound RN PRN. Diamond WAYNEN RN        Wound Team Plan: Bilateral Buttock- cleanse gently with soap and water, apply mixture or Triad or Calazime cream and Nystatin powder, Cover with border dressing daily and prn. Continue to offload.  Bilateral Heels- Apply betadine gauze, abd, kerlix, daily and prn. Continue to offload.  Bilateral Groin- Cleanse gently with soap and water, apply Mixture of Calazime cream and Nystatin powder daily and prn.         Diamond Argueta RN  1/23/2024  12:37 PM

## 2024-01-23 NOTE — PROGRESS NOTES
Ayanna Grsos is a 73 y.o. female on day 4 of admission presenting with Septic shock (CMS/HCC).    Subjective   Interval History:   Afebrile  Awake, responsive  Remains on pressors    Review of Systems   All other systems reviewed and are negative.      Objective   Range of Vitals (last 24 hours)  Heart Rate:  []   Temp:  [34.3 °C (93.7 °F)-36.2 °C (97.1 °F)]   Resp:  [12-28]   Weight:  [77.8 kg (171 lb 8.3 oz)-79 kg (174 lb 2.6 oz)]   SpO2:  [93 %-99 %]   Daily Weight  01/23/24 : 79 kg (174 lb 2.6 oz)    Body mass index is 31.85 kg/m².    Physical Exam  Constitutional:       Appearance: Normal appearance.   HENT:      Head: Normocephalic and atraumatic.      Nose: Nose normal.   Eyes:      Extraocular Movements: Extraocular movements intact.      Conjunctiva/sclera: Conjunctivae normal.   Cardiovascular:      Heart sounds: Normal heart sounds, S1 normal and S2 normal.   Pulmonary:      Breath sounds: Decreased breath sounds present.   Abdominal:      General: Bowel sounds are normal.      Palpations: Abdomen is soft.   Musculoskeletal:      Cervical back: Normal range of motion and neck supple.   Skin:     Comments: Stage III sacral ulcer, bilateral posterior heel eschar   Neurological:      Mental Status: She is alert.     Antibiotics  aspirin tablet 325 mg  acetaminophen (Tylenol) tablet 650 mg  cefepime (Maxipime) 1 g in dextrose 5 % 50 mL IV  sodium chloride 0.9 % bolus 2,229 mL  apixaban (Eliquis) tablet 2.5 mg  escitalopram (Lexapro) tablet 10 mg  febuxostat (Uloric) tablet 40 mg  fenofibrate (Triglide) tablet 160 mg  gabapentin (Neurontin) capsule 100 mg  midodrine (Proamatine) tablet 15 mg  nystatin (Mycostatin) 100,000 unit/gram powder  oxyCODONE-acetaminophen (Percocet) 5-325 mg per tablet 1 tablet  simvastatin (Zocor) tablet 20 mg  piperacillin-tazobactam-dextrose (Zosyn) IV 2.25 g  vancomycin (Vancocin) capsule 125 mg  oxygen (O2) therapy  dextrose 50 % injection 25 g  glucagon (Glucagen)  injection 1 mg  dextrose 10 % in water (D10W) infusion  insulin lispro (HumaLOG) injection 0-10 Units  nystatin (Mycostatin) 100,000 unit/gram powder 1 Application  vancomycin-diluent combo no.1 (Xellia) IVPB 1,750 mg  piperacillin-tazobactam-dextrose (Zosyn) IV 2.25 g  sodium chloride 0.9 % bolus 500 mL  norepinephrine (Levophed) 8 mg in dextrose 5% 250 mL (0.032 mg/mL) infusion (premix)  vancomycin (Vancocin) placeholder  pantoprazole (ProtoNix) EC tablet 40 mg  pantoprazole (ProtoNix) injection 40 mg  sennosides-docusate sodium (Judy-Colace) 8.6-50 mg per tablet 1 tablet  doxycycline (Vibramycin) in dextrose 5 % in water (D5W) 100 mL  mg  LORazepam (Ativan) injection 2 mg  magnesium sulfate IV 2 g  zinc oxide 20 % ointment 1 Application  nystatin (Mycostatin) cream  norepinephrine (Levophed) 8 mg in dextrose 5% 250 mL (0.032 mg/mL) infusion (premix)  heparin 1,000 unit/mL injection 2,000 Units  heparin 1,000 unit/mL injection 2,000 Units  nystatin (Mycostatin) ointment  acetaminophen (Tylenol) oral liquid 1,000 mg  albumin human 25 % solution 12.5 g  perflutren lipid microspheres (Definity) injection 0.5-10 mL of dilution  sulfur hexafluoride microsphr (Lumason) injection 24.28 mg  perflutren protein A microsphere (Optison) injection 0.5 mL  ipratropium-albuteroL (Duo-Neb) 0.5-2.5 mg/3 mL nebulizer solution 3 mL  sodium chloride 3 % nebulizer solution 3 mL  vancomycin-diluent combo no.1 (Xellia) IVPB 750 mg  sennosides-docusate sodium (Judy-Colace) 8.6-50 mg per tablet 1 tablet  acetaminophen (Tylenol) tablet 650 mg      Relevant Results  Labs  Results from last 72 hours   Lab Units 01/23/24  0419 01/22/24  0440 01/21/24  0436   WBC AUTO x10*3/uL 16.5* 21.2* 21.0*   HEMOGLOBIN g/dL 10.7* 12.0 11.6*   HEMATOCRIT % 32.4* 37.5 36.5   PLATELETS AUTO x10*3/uL 233 355 424   NEUTROS PCT AUTO % 74.9 74.6 77.0   LYMPHS PCT AUTO % 14.4 14.6 12.9   MONOS PCT AUTO % 6.6 6.1 7.8   EOS PCT AUTO % 3.0 3.2 0.9     Results  from last 72 hours   Lab Units 01/23/24  0419 01/22/24  0440 01/21/24  0436   SODIUM mmol/L 128* 127* 133   POTASSIUM mmol/L 3.9 3.6 3.9   CHLORIDE mmol/L 93* 92* 97   CO2 mmol/L 22* 21* 23*   BUN mg/dL 15 27* 24   CREATININE mg/dL 2.60* 4.20* 3.90*   GLUCOSE mg/dL 124* 159* 209*   CALCIUM mg/dL 8.0* 8.6 8.7   ANION GAP mmol/L 13 14 13   EGFR mL/min/1.73m*2 19* 11* 12*   PHOSPHORUS mg/dL 2.3* 4.0 4.2     Results from last 72 hours   Lab Units 01/23/24 0419 01/22/24 0440 01/21/24  0436   ALK PHOS U/L 119 106 113   BILIRUBIN TOTAL mg/dL 1.6* 0.9 1.0   PROTEIN TOTAL g/dL 5.1* 5.2* 5.5*   ALT U/L 10 10 11   AST U/L 16 17 19   ALBUMIN g/dL 2.6* 1.9* 2.1*     Estimated Creatinine Clearance: 19.3 mL/min (A) (by C-G formula based on SCr of 2.6 mg/dL (H)).  C-Reactive Protein   Date Value Ref Range Status   12/25/2023 5.20 (H) 0.00 - 2.00 mg/dL Final     CRP   Date Value Ref Range Status   06/23/2023 19.9 (H) 0 - 2.0 MG/DL Final     Comment:     Performed at [address] [address]   05/22/2022 1.0 0 - 2.0 MG/DL Final     Comment:     Performed at [address] [address]     Microbiology  Susceptibility data from last 14 days.  Collected Specimen Info Organism Ampicillin Cefazolin Cefazolin (uncomplicated UTIs only) Ciprofloxacin Gentamicin Piperacillin/Tazobactam Tetracycline Trimethoprim/Sulfamethoxazole   01/19/24 Urine from Indwelling (Amos) Catheter Proteus mirabilis S S S S S S R S   Reviewed  Imaging  XR tibia fibula right 2 views    Result Date: 1/22/2024  Interpreted By:  Real Mendieta, STUDY: XR TIBIA FIBULA RIGHT 2 VIEWS; 1/22/2024 2:15 pm   INDICATION: Signs/Symptoms:evaluate source of infection, osteomyelitis;   COMPARISON: None available.   ACCESSION NUMBER(S): [id number]   ORDERING CLINICIAN: BAN GIFFORD   TECHNIQUE: Views: AP and Lateral of the right tibia and fibula   FINDINGS: RESULT: There is no evidence for fracture or dislocation. Tricompartmental  degenerative changes of the right knee. The tibia and fibula appear intact without bony erosion or evidence for osteomyelitis. No other bony or soft tissue abnormality is identified. No subcutaneous gas is noted.       No evidence for acute osseous abnormality. No bony erosion to suggest osteomyelitis.   Signed by: Real Mendieta 1/22/2024 3:15 PM Dictation workstation:   [id number]    XR ankle right 3+ views    Result Date: 1/22/2024  Interpreted By:  Real Mendieta, STUDY: XR ANKLE RIGHT 3+ VIEWS; 1/22/2024 2:15 pm   INDICATION: Signs/Symptoms:Evuluate source of infection, osteomyelitis;   COMPARISON: None available.   ACCESSION NUMBER(S): [id number]   ORDERING CLINICIAN: BAN GIFFORD   TECHNIQUE: Views: AP, Lateral, Oblique, right ankle   FINDINGS: RESULT: There is no evidence for fracture or dislocation. The ankle mortise is intact. Joint spaces appear adequately maintained. No bony erosion to suggest osteomyelitis. No bone lesion or soft tissue abnormality is identified. No subcutaneous gas is seen.       No evidence for acute osseous abnormality. No bony erosion to suggest osteomyelitis.   Signed by: Real Mendieta 1/22/2024 3:14 PM Dictation workstation:   [id number]    Transthoracic Echo (TTE) Complete    Result Date: 1/22/2024           [place] [address]            Phone [phone] TRANSTHORACIC ECHOCARDIOGRAM REPORT  Patient Name:      BASIA GUAN GARY Gray Physician:   [id number] [facility]  Study Date:        1/22/2024           Ordering Provider:   [id number] ERI BERMAN MRN/PID:           [id number]            Fellow: Accession#:        [id number]        Nurse: Date of Birth/Age: 1950 / 73 years Sonographer:         Tabatha Page RDCS Gender:            F                   Additional Staff: Height:            157.00 cm           Admit Date: Weight:            75.00 kg             Admission Status:    Inpatient - Routine BSA:               1.76 m2             Department Location: [facility] ICU Blood Pressure: 88 /49 mmHg Study Type:    TRANSTHORACIC ECHO (TTE) COMPLETE Diagnosis/ICD: Chronic combined systolic (congestive) and diastolic (congestive)                heart failure (CHF)-I50.42; Sepsis, unspecified organism-A41.9 Indication:    heart failure,septic shock CPT Codes:     Echo Complete w Full Doppler-[id number] Patient History: BMI:               Obese >30 Pertinent History: Sepsis, PVD, A-Fib, CVA, Cancer and HTN. breast                    prosthesis/ca, ESRD,anemia,septic shock,heart failure. Study Detail: The following Echo studies were performed: 2D, M-Mode, Doppler and               color flow. Technically challenging study due to prosthesis,               prominent lung artifact and body habitus.  PHYSICIAN INTERPRETATION: Left Ventricle: Left ventricular systolic function is normal, with an estimated ejection fraction of 70%. There are no regional wall motion abnormalities. The left ventricular cavity size is normal. Left ventricular diastolic filling was indeterminate. Left Atrium: The left atrium is moderately dilated. Right Ventricle: The right ventricle is normal in size. There is normal right ventricular global systolic function. Right Atrium: The right atrium is normal in size. Aortic Valve: The aortic valve is trileaflet. There is no evidence of aortic valve regurgitation. The peak instantaneous gradient of the aortic valve is 2.8 mmHg. Mitral Valve: The mitral valve is normal in structure. There is no evidence of mitral valve regurgitation. Tricuspid Valve: The tricuspid valve is structurally normal. There is trace tricuspid regurgitation. Pulmonic Valve: The pulmonic valve is not well visualized. The pulmonic valve regurgitation was not well visualized. Pericardium: There is no pericardial effusion noted. Aorta: The aortic root is normal.  CONCLUSIONS:  1. Left  ventricular systolic function is normal with a 70% estimated ejection fraction.  2. The left atrium is moderately dilated.  3. Left ventricular diastolic filling indeterminate. QUANTITATIVE DATA SUMMARY: 2D MEASUREMENTS:                          Normal Ranges: LAs:           4.50 cm   (2.7-4.0cm) IVSd:          0.61 cm   (0.6-1.1cm) LVPWd:         0.88 cm   (0.6-1.1cm) LVIDd:         3.66 cm   (3.9-5.9cm) LV Mass Index: 41.9 g/m2 LV SYSTOLIC FUNCTION BY 2D PLANIMETRY (MOD):                     Normal Ranges: EF-A4C View: 68.3 % (>=55%) LV DIASTOLIC FUNCTION:                     Normal Ranges: MV Peak E: 1.19 m/s (0.7-1.2 m/s) MV Peak A: 0.90 m/s (0.42-0.7 m/s) E/A Ratio: 1.32     (1.0-2.2) MITRAL VALVE:                 Normal Ranges: MV DT: 192 msec (150-240msec) AORTIC VALVE:                         Normal Ranges: AoV Vmax:      0.84 m/s (<=1.7m/s) AoV Peak P.8 mmHg (<20mmHg) LVOT Max Shimon:  0.47 m/s (<=1.1m/s) LVOT Diameter: 1.90 cm  (1.8-2.4cm) AoV Area,Vmax: 1.57 cm2 (2.5-4.5cm2) TRICUSPID VALVE/RVSP:                   Normal Ranges: IVC Diam: 1.05 cm  [id number] [facility]  Electronically signed on 2024 at 2:53:04 PM  ** Final **     ECG 12 lead    Result Date: 2024  Sinus tachycardia Right bundle branch block Septal infarct (cited on or before 06-DEC-2023) Possible Lateral infarct , age undetermined Abnormal ECG When compared with ECG of 06-DEC-2023 13:47, Borderline criteria for Lateral infarct are now Present Questionable change in initial forces of Septal leads Nonspecific T wave abnormality, improved in Lateral leads    XR foot left 3+ views    Result Date: 2024  Interpreted By:  Real Mendieta, STUDY: XR FOOT LEFT 1-2 VIEWS; 2024 4:15 pm   INDICATION: Signs/Symptoms:osteomyelitis. Pain   COMPARISON: 2023   ACCESSION NUMBER(S): [id number]   ORDERING CLINICIAN: BAN GIFFORD   TECHNIQUE: Views:  AP, Lat, Oblique, left foot   FINDINGS: RESULT: There is no evidence for fracture  or dislocation. 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 Mild hammertoe deformities. Joint spaces appear adequately maintained. Soft tissue ulceration of the posterior aspect of the heel however no evidence for bony erosion to suggest osteomyelitis.       No evidence for acute fracture or acute bony erosion to suggest osteomyelitis. Chronic changes as described.   Signed by: Real Mendieta 1/21/2024 7:15 PM Dictation workstation:   [id number]    CT chest abdomen pelvis wo IV contrast    Result Date: 1/21/2024  Interpreted By:  Maria Garcia, STUDY: CT CHEST ABDOMEN PELVIS WO CONTRAST;  1/20/2024 11:42 pm   INDICATION: Mental status change. Elevated white blood cell count with infectious workup.   COMPARISON: 08/20/2023   ACCESSION NUMBER(S): [id number]   ORDERING CLINICIAN: BAN GIFFORD   TECHNIQUE: CT of the chest, abdomen and pelvis was performed with no oral or intravenous contrast administered. Sagittal and coronal reformations were completed by the technologist at the acquisition scanner.   All CT examinations are performed with 1 or more of the following dose reduction techniques: Automated exposure control, adjustment of mA and/or kv according to patient's size, or use of iterative reconstruction techniques.   FINDINGS: Please note that the study is limited without intravenous contrast.   CHEST: Bilateral pleural effusions are present with right effusion moderately small in size and with the left effusion moderately small in size as well. There is shift of the heart and mediastinum to the left of midline due to atelectasis of the left upper lobe. There is consolidation throughout left lower lobe with air bronchograms noted. On the right, there is compressive atelectasis seen posteriorly in the right lower lobe.   There is mild reactive mediastinal adenopathy seen at this time with mediastinal lymph nodes increased in size since prior study. Several of these mediastinal nodes are  at the upper limits of normal measuring 8 mm in short axis diameter.   No pericardial effusion is present. The cardiac size is normal with mitral annulus calcification.   There has been prior bilateral mastectomy with reconstruction of the left breast with implant placement. Surgical clips are visible within the left axilla. Stent grafts are visible within the left upper extremity and within the left innominate, subclavian as well as axillary veins.   A peripherally calcified 1.8 cm nodule arises from the lower pole of left thyroid lobe.   ABDOMEN:   LIVER: Unremarkable.   BILE DUCTS: No intrahepatic or extrahepatic biliary ductal dilatation is seen.   GALLBLADDER: Cholelithiasis is observed with some calcification of the gallbladder wall demonstrated as well. No gallbladder wall thickening or pericholecystic fluid is evident.   PANCREAS: Unremarkable   SPLEEN: Unremarkable   ADRENAL GLANDS: Unremarkable   KIDNEYS AND URETERS: Kidneys are small in size with extensive renal artery calcification demonstrated.   PELVIS:   BLADDER: Amos catheter is present within the decompressed urinary bladder.   REPRODUCTIVE ORGANS: Calcification of the arcuate arteries within the uterus is seen. There is no uterine enlargement or adnexal mass.   BOWEL: A rectal balloon is seen. There is no abnormal distention of the intestinal tract.   VESSELS: There is atherosclerosis of the thoracoabdominal aorta and iliac arteries with calcification in the walls of the celiac, splenic, hepatic, and mesenteric arteries.   PERITONEUM/RETROPERITONEUM/LYMPH NODES: There is a minimal amount of ascites seen about the liver and spleen with mild presacral edema noted.   ABDOMINAL WALL: There is anasarca involving the soft tissues of the abdomen and pelvis. Postoperative change from ventral hernia repair is seen.   BONES: Bilateral sacroiliitis is seen. There is lumbar dextroscoliosis. Chronic rotator cuff tear is present bilaterally with osteoarthritis  of both shoulders, right greater than left.       Chest 1.  Moderately small bilateral pleural effusions with left upper lobe atelectasis and compressive atelectasis seen posteriorly in right lower lobe. A left lower lobe pneumonia is identified. There is extensive airspace consolidation within left lower lobe with air bronchograms observed. Mild mediastinal reactive adenopathy is present as well.   Abdomen-Pelvis 1.  Cholelithiasis without evidence of cholecystitis. 2. Small amount of ascites with mild presacral edema and anasarca within the soft tissues of the abdominal wall. 3. Renal atrophy with extensive vascular calcifications.     MACRO: None   Signed by: Maria Garcia 1/21/2024 8:30 AM Dictation workstation:   [id number]    CT head wo IV contrast    Result Date: 1/21/2024  Interpreted By:  Maria Garcia, STUDY: CT HEAD WO IV CONTRAST 1/20/2024 11:42 pm   INDICATION: Signs/Symptoms:mental status changes   COMPARISON: 12/11/2023   ACCESSION NUMBER(S): [id number]   ORDERING CLINICIAN: BAN GIFFORD   TECHNIQUE: Unenhanced axial images of the brain are completed.   All CT examinations are performed with 1 or more of the following dose reduction techniques: Automated exposure control, adjustment of mA and/or kv according to patient's size, or use of iterative reconstruction techniques.   FINDINGS: Helical unenhanced axial images of the brain demonstrate a mild to moderate degree of ventricular enlargement with proportionate widening of the sulci and sylvian fissures. There is no midline shift, mass effect, extra-axial fluid collection, or acute intracranial hemorrhage. There is diminished density seen in the periventricular white matter indicating chronic microvascular ischemic disease. There is calcified plaque seen within the distal vertebral and internal carotid arteries bilaterally. No calvarial abnormality is seen.       Atrophy and chronic microvascular ischemic disease without acute intracranial process.    Signed by: Maria Garcia 1/21/2024 8:09 AM Dictation workstation:   [id number]    XR chest 1 view    Result Date: 1/20/2024  Interpreted By:  Brendon Perez, STUDY: XR CHEST 1 VIEW; 1/20/2024 5:03 pm   INDICATION: CLINICAL INFORMATION: Signs/Symptoms:Insertion right IJ central line, also left thoracentesis.   COMPARISON: 01/19/2024 at 1338 hours   ACCESSION NUMBER(S): [id number]   ORDERING CLINICIAN: BOZENA ANTONIO   TECHNIQUE: Portable chest one view.   FINDINGS: The cardiac size is indeterminate in view of the AP projection. There is no change in the right-sided dual port central venous catheter. There is a new right internal jugular venous catheter present with the tip at approximately same level as the dual port central venous catheter, overlying the mid to lower right atrium. There is no evidence for pneumothorax. Patient has a history of left thoracentesis without evidence for pneumothorax on the left. There is bilateral basilar alveolar infiltrate and effusions. Left effusion is decreased compared to the study from 1 day earlier.   Surgical clips are identified within left chest wall. Endovascular stent is identified in the distribution of the left subclavian artery.       1. Status post right-sided central venous catheter placement as described above with the tip overlying the mid to caudal aspect of the right atrium. There is no evidence for pneumothorax. 2. No evidence for left-sided pneumothorax after left-sided thoracentesis. Decrease in the left effusion. 3. Bilateral basilar infiltrates and effusions are present. Follow-up to assure complete clearing is suggested.   MACRO: none   Signed by: Brendon Perez 1/20/2024 5:11 PM Dictation workstation:   [id number]    XR chest 1 view    Result Date: 1/19/2024  Interpreted By:  Real Mendieta, STUDY: XR CHEST 1 VIEW; 1/19/2024 1:38 pm   INDICATION: Signs/Symptoms:dyspnea.   COMPARISON: 12/26/2023   ACCESSION NUMBER(S): [id number]   ORDERING CLINICIAN:  EMELY GOLDSMITH   TECHNIQUE: 1 view of the chest was performed.   FINDINGS: There may be a minimal right pleural effusion. Slight prominence of the interstitium otherwise the right lung is clear. Improved appearance of the left lung with improved aeration of the right upper lobe. There is opacification of the left lower lobe possibly due to large pleural effusion which is similar to the prior study. No pneumothorax. Right-sided dual lumen central line catheter with tip at the proximal atrium. The cardiomediastinal silhouette is within normal limits. Left-sided subclavian stents are again noted.       Improved aeration and appearance of the left lung superiorly however there is a persistent large left pleural effusion and opacification of the left lower lobe.   Signed by: Real Mendieta 1/19/2024 1:44 PM Dictation workstation:   [id number]    Electrocardiogram, 12-lead PRN ACS symptoms    Result Date: 1/7/2024  Sinus rhythm Right bundle branch block Septal infarct (cited on or before 06-DEC-2023) Abnormal ECG When compared with ECG of 06-DEC-2023 13:06, Questionable change in initial forces of Septal leads Confirmed by Herminio Lemus ([id number]) on 1/7/2024 11:04:01 AM    XR chest 1 view    Result Date: 12/26/2023  Interpreted By:  Rikki Patel, STUDY: XR CHEST 1 VIEW; 12/26/2023 12:23 pm   INDICATION: Signs/Symptoms:hypoxia   COMPARISON: 12/19/2023   ACCESSION NUMBER(S): [id number]   ORDERING CLINICIAN: BRITTANI TOLEDO   FINDINGS: The study is limited due to rotation. Dialysis catheter is stable in position. The cardiac silhouette is indeterminate due to the technique. There is interval significant worsening of large left effusion and left lung atelectasis/infiltrates, with complete opacification of the left hemithorax. There is no pneumothorax. Vascular stents are noted in subclavian vessels. Surgical staples are again seen in the left axilla. The osseous structures are unchanged.       Limited study. Interval  worsening of large left pleural effusion and left lung infiltrates/atelectasis, with complete opacification of the left hemithorax.   Signed by: Rikki Patel 12/26/2023 12:40 PM Dictation workstation:   [id number]    Assessment/Plan   Septic shock  Left-sided pleural effusion sedated  Left lower lobe pneumonia  Proteus urinary tract infection  History of MRSA endocarditis  History of C. 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

## 2024-01-24 NOTE — PROGRESS NOTES
[facility] Critical Care Medicine       Date:  1/24/2024  Patient:  Ayanna Gross  YOB: 1950  MRN:  [id number]   Admit Date:  1/19/2024  ========================================================================================================    Chief Complaint   Patient presents with    Shortness of Breath     Patient with SOB and fever. Is very lethargic. Dialysis MWF but did not go today dt SOB and fever         History of Present Illness:  Ayanna Gross is a 73 y.o. year old female patient presenting with anemia, fever worsening sacral and lower extremity wounds and concerns from the nursing facility for left pneumonia. Patient presents to the emergency department found to be febrile she was hypotensive but responded to 500 cc fluid bolus she was found to have a hemoglobin of 6 she was begun and ordered for a single unit of blood transfusion will actually provide 2 units of packed cells additional IV fluids to complete her volume resuscitation given her fever clinical evidence of hypovolemia and infection with an elevated lactate of 2.3 she received a total of approximately 1800 mL of fluid she is DNR/DNI by advanced directives but ICU level care is acceptable her granddaughter questions disorder but it was confirmed and signed by her  who is her POA.  She will be admitted to the intensive care unit given her frail compromised state and high risk of morbidity and mortality necessitating IV antibiotics fluids blood transfusion and ultimately will receive dialysis likely tomorrow wound care consultation to general surgery has been requested as well as consultation with the intensivist infectious disease and nephrology wounds are cultured C. difficile is requested she remains on oral vancomycin for suppression given recent C. difficile infection in December she is initiated on pharmacy dosing of vancomycin and Zosyn 2.25 g IV every 6 hours. She continues on Eliquis at this time for DVT  prophylaxis as well as atrial fibrillation.     Further Hx: Ayanna Gross 73-year-old female with past medical history of atrial fibrillation, end-stage renal disease, C. difficile infection, moderate sized recurrent left pleural effusions, and MRSA bacteremia with mitral valve vegetation admitted to ICU for management of septic shock.  On morning exam patient was on 0.22 of levo just to maintain a MAP of 60 and SBP of 88.  Last documented echocardiogram from [facility] shows EF between 55 to 60% with grade 2 left ventricular diastolic dysfunction, and moderate circumferential pericardial effusion.  Unable to find adequate documentation of who treated her endocarditis, but there was documentation that she was currently still receiving treatment when she was admitted to [facility]. On further chart review Dr. Coulter is her infectious disease doctor. She also had 2 thoracentesis's for moderate to large sided left pleural effusions with cytology showing no malignancy, but unable to differentiate between exudative and transudative at this time.       Interval ICU Events:  1/20: Pt missed last dialysis appointment, no signs of urgent need today, but nephrology Dr. Cabello is following. Pt on high requirements of levophed. She is on broad spectrum abx for coverage currently ID Dr. Coulter following. Pt switches from being A&O x 3 and lethargy. Goal today is to get more central access, david, and thoracentesis.      1/21: Pt more lethargic this AM requiring uptitration of her Levophed to 0.28. Diagnostic and therapeutic thoracentesis yesterday over 1L of hazy straw color fluid. Currently still on vanc, zosyn, and doxy for abx. Potential dialysis this today, but no urgency.      1/22: Pt come in and out of lethargic states throughout the day. Still on high doses of levophed this morning. Per nephrologist Dr. Cabello titrate for an SBP goal of 70 and mentation. Left Pleural fluid analysis  resulted as transudative per lights criteria. Tablo today per nephrology. ID managing with empiric coverage. Source control may not have been met. Podiatry, wound, and surgery to evaluate multiple wounds over the body.      1/23: Pt received dialysis via tablo yesterday. Very similar status as yesterday from lethargic states to well mentation. Patient remains on Levophed still at 0.36 with empiric coverage with vanc + zosyn & doxy. Increase in patients total bilirubin today 0.9-1.6. Consulting services include: Wound, Vascular, Podiatry, Acute surgery, Palliative, Infectious Disease, and Nephrology.     1/24: Patient stable overnight, norepinephrine weaned down to 0.16 with stable pressures and new goal of systolics > 70    Medical History:  Past Medical History:   Diagnosis Date    Asthma     CAD (coronary artery disease)     CHF (congestive heart failure) (CMS/[facility])     Chronic kidney disease on chronic dialysis (CMS/[facility])     Hypertension     Personal history of diseases of the blood and blood-forming organs and certain disorders involving the immune mechanism     History of autoimmune disorder    Sleep apnea     Type 2 diabetes mellitus without complications (CMS/[facility]) 09/15/2022    Diabetes mellitus     Past Surgical History:   Procedure Laterality Date    CARPAL TUNNEL RELEASE  11/18/2013    Neuroplasty Decompression Median Nerve At Carpal Tunnel    HAND SURGERY  11/18/2013    Hand Surgery                                                                                                                                                          MASTECTOMY, PARTIAL  04/10/2014    Right Breast Partial Mastectomy    MR HEAD ANGIO WO IV CONTRAST  8/29/2023    MR HEAD ANGIO WO IV CONTRAST [facility]    OTHER SURGICAL HISTORY  11/18/2013    Breast Reconstruction With Implant Prosthesis    OTHER SURGICAL HISTORY  11/18/2013    Thyroid Surgery Substernal Thyroidectomy Partial    OTHER SURGICAL HISTORY  11/18/2013     Modified Radical Mastectomy Left Breast    OTHER SURGICAL HISTORY  04/04/2022    Carpal tunnel surgery    OTHER SURGICAL HISTORY  04/04/2022    Foot surgery    OTHER SURGICAL HISTORY  04/04/2022    Hernia repair    SENTINEL LYMPH NODE BIOPSY  04/10/2014    [place] Lymph Node Biopsy    TONSILLECTOMY  11/18/2013    Tonsillectomy    UMBILICAL HERNIA REPAIR  11/18/2013    Umbilical Hernia Repair    US GUIDED PERCUTANEOUS PLACEMENT  6/29/2023    US GUIDED PERCUTANEOUS PLACEMENT [facility]     Medications Prior to Admission   Medication Sig Dispense Refill Last Dose    apixaban (Eliquis) 2.5 mg tablet Take 1 tablet (2.5 mg) by mouth 2 times a day.       blood sugar diagnostic strip 1 x daily e11.65 for 90 days       blood-glucose sensor (Dexcom G6 Sensor) device Check blood sugar when needed and as instructed 6 each 3     doxycycline (Vibramycin) 100 mg capsule Take 1 capsule (100 mg) by mouth once daily. Take with at least 8 ounces (large glass) of water, do not lie down for 30 minutes after Do not start before January 6, 2024.       escitalopram (Lexapro) 10 mg tablet 1 tablet Orally Once a day for 30 day(s)       febuxostat (Uloric) 40 mg tablet TAKE ONE TABLET BY MOUTH EVERY OTHER DAY       fenofibrate (Triglide) 160 mg tablet Take 1 tablet (160 mg) by mouth once daily.       gabapentin (Neurontin) 100 mg capsule Take 1 capsule (100 mg) by mouth 2 times a day.       insulin lispro (HumaLOG) 100 unit/mL injection Inject 0-0.1 mL (0-10 Units) under the skin 3 times a day with meals. Take as directed per insulin instructions.       midodrine (Proamatine) 5 mg tablet Take 3 tablets (15 mg) by mouth 3 times a day with meals.       nystatin (Mycostatin) 100,000 unit/gram powder APPLY 1 GRAM TO SKIN TWO TIMES A DAY 30 g 0     oxyCODONE-acetaminophen (Percocet) 5-325 mg tablet Take 1 tablet by mouth every 12 hours if needed for severe pain (7 - 10). 120 tablet 0     simvastatin (Zocor) 20 mg tablet TAKE 1 TABLET BY  MOUTH DAILY 60 tablet 5      Patient has no known allergies.  Social History     Tobacco Use    Smoking status: Never    Smokeless tobacco: Never   Substance Use Topics    Alcohol use: Not Currently    Drug use: Never     Family History   Problem Relation Name Age of Onset    Lymphoma Mother      Prostate cancer Father          passed away fabiana min [address] [place] [address] Medications:    norepinephrine, 0.01-3 mcg/kg/min, Last Rate: 0.15 mcg/kg/min (01/24/24 0953)  vasopressin, 0.03 Units/min, Last Rate: 0.03 Units/min (01/24/24 0944)          Current Facility-Administered Medications:     acetaminophen (Tylenol) tablet 650 mg, 650 mg, oral, TID, Yusuf De La Garza PA-C, 650 mg at 01/24/24 0852    apixaban (Eliquis) tablet 2.5 mg, 2.5 mg, oral, BID, Judd Tavera DO, 2.5 mg at 01/24/24 0852    dextrose 10 % in water (D10W) infusion, 0.3 g/kg/hr, intravenous, Once PRN, Judd Tavera DO    dextrose 50 % injection 25 g, 25 g, intravenous, q15 min PRN, Judd Tavera DO    doxycycline (Vibramycin) capsule 100 mg, 100 mg, oral, Daily, Yusuf De La Garza PA-C, 100 mg at 01/23/24 1308    [Held by provider] doxycycline (Vibramycin) in dextrose 5 % in water (D5W) 100 mL  mg, 100 mg, intravenous, q24h, Yusuf De La Garza PA-C, Stopped at 01/22/24 1609    escitalopram (Lexapro) tablet 10 mg, 10 mg, oral, Nightly, Judd Tavera DO, 10 mg at 01/23/24 2107    gabapentin (Neurontin) capsule 100 mg, 100 mg, oral, BID, Judd Tavera DO, 100 mg at 01/24/24 0852    glucagon (Glucagen) injection 1 mg, 1 mg, intramuscular, q15 min PRN, Judd Tavera DO    heparin 1,000 unit/mL injection 2,000 Units, 2,000 Units, intra-catheter, After Dialysis, Saeed Mondragon MD, 1,600 Units at 01/22/24 1822    heparin 1,000 unit/mL injection 2,000 Units, 2,000 Units, intra-catheter, After Dialysis, Saeed Mondragon MD, 1,600 Units at 01/22/24 1820    insulin lispro (HumaLOG) injection 0-10 Units, 0-10 Units,  subcutaneous, TID with meals, Judd Tavera DO, 2 Units at 01/21/24 1620    ipratropium-albuteroL (Duo-Neb) 0.5-2.5 mg/3 mL nebulizer solution 3 mL, 3 mL, nebulization, q6h, Yusuf De La Garza PA-C, 3 mL at 01/24/24 0808    magnesium oxide (Mag-Ox) tablet 400 mg, 400 mg, oral, BID, LILLIE Mccray, 400 mg at 01/24/24 0852    midodrine (Proamatine) tablet 15 mg, 15 mg, oral, TID with meals, Judd Tavera DO, 15 mg at 01/24/24 0852    norepinephrine (Levophed) 8 mg in dextrose 5% 250 mL (0.032 mg/mL) infusion (premix), 0.01-3 mcg/kg/min, intravenous, Continuous, Yusuf De La Garza PA-C, Last Rate: 20.9 mL/hr at 01/24/24 0953, 0.15 mcg/kg/min at 01/24/24 0953    nystatin (Mycostatin) 100,000 unit/gram powder, , Topical, BID, Judd Tavera DO, Given at 01/24/24 0854    nystatin (Mycostatin) cream, , Topical, BID, LILLIE Blair, Given at 01/24/24 0854    oxygen (O2) therapy, , inhalation, Continuous PRN - O2/gases, Judd Tavera DO, 4 L/min at 01/23/24 1600    pantoprazole (ProtoNix) EC tablet 40 mg, 40 mg, oral, Daily, 40 mg at 01/24/24 0852 **OR** pantoprazole (ProtoNix) injection 40 mg, 40 mg, intravenous, Daily, Yusuf De La Garza PA-C, 40 mg at 01/21/24 0824    perflutren lipid microspheres (Definity) injection 0.5-10 mL of dilution, 0.5-10 mL of dilution, intravenous, Once in imaging, LILLIE Mccray    perflutren protein A microsphere (Optison) injection 0.5 mL, 0.5 mL, intravenous, Once in imaging, Ronald R Kiss, APRN-CNP    piperacillin-tazobactam-dextrose (Zosyn) IV 2.25 g, 2.25 g, intravenous, q8h, Judd Tavera DO, Stopped at 01/24/24 0530    sennosides-docusate sodium (Judy-Colace) 8.6-50 mg per tablet 1 tablet, 1 tablet, oral, Nightly PRN, Yusuf De La Garza PA-C    sodium chloride 3 % nebulizer solution 3 mL, 3 mL, nebulization, q6h GULSHAN, Yusuf De La Garza PA-C, 3 mL at 01/24/24 0809    sulfur hexafluoride microsphr (Lumason) injection 24.28 mg, 2 mL, intravenous,  Once in imaging, Ronald PINO Carmela, APRN-CNP    vancomycin (Vancocin) capsule 125 mg, 125 mg, oral, 4x daily, Judd Tavera, , 125 mg at 01/24/24 0852    vancomycin (Vancocin) placeholder, , miscellaneous, Daily PRN, Kendall Alfonso, Camilla    vasopressin (Vasostrict) 0.2 unit/mL infusion, 0.03 Units/min, intravenous, Continuous, Dhiraj De La Garza MD, Last Rate: 9 mL/hr at 01/24/24 0944, 0.03 Units/min at 01/24/24 0944    zinc oxide 20 % ointment 1 Application, 1 Application, Topical, BID, Nickolas Gallego APRN-CNP, 1 Application at 01/24/24 0854    Review of Systems:  14 point review of systems was completed and negative except for those specially mention in my HPI    Physical Exam:    Heart Rate:  [100-122]   Temp:  [36 °C (96.8 °F)-36.3 °C (97.3 °F)]   Resp:  [13-25]   Weight:  [79.5 kg (175 lb 4.3 oz)]   SpO2:  [85 %-100 %]     Physical Exam  Constitutional:       Appearance: She is obese. She is ill-appearing.   HENT:      Nose: Rhinorrhea present.      Mouth/Throat:      Mouth: Mucous membranes are moist.      Pharynx: Oropharynx is clear.   Eyes:      Pupils: Pupils are equal, round, and reactive to light.   Cardiovascular:      Rate and Rhythm: Regular rhythm. Tachycardia present.      Pulses: Normal pulses.   Pulmonary:      Effort: Pulmonary effort is normal.   Abdominal:      General: Abdomen is flat.      Palpations: Abdomen is soft.   Musculoskeletal:         General: Normal range of motion.      Cervical back: Normal range of motion.   Skin:     General: Skin is warm.      Comments: Skin tear to Left hand  Bilateral Ulcers to heals  Bilateral +3 edema and brusing to upper extremeties but right side more red than left, temp equal bilateraly   Neurological:      General: No focal deficit present.      Mental Status: She is oriented to person, place, and time.   Psychiatric:         Mood and Affect: Mood normal.       Objective:    I have reviewed all medications, laboratory results, and imaging  pertinent for today's encounter.    FiO2 (%):  [28 %] 28 %      Intake/Output Summary (Last 24 hours) at 1/24/2024 1025  Last data filed at 1/24/2024 0944  Gross per 24 hour   Intake 1399.38 ml   Output 400 ml   Net 999.38 ml         Assessment/Plan:    I am currently managing this critically ill patient for the following problems:    Septic Shock   ESRD   Chronic Hypotension   Recurrent Pleural Effusions   Vasculopathy   Multiple Wounds with and with out infections  C. Diff infection  Acute Metabolic Encephalopathy    Neuro/Psych/Pain Ctrl/Sedation:  Acute Metabolic Encephalopathy  -Pain Control: Tylenol for mild, Percocet for moderate   -CAM Assessment, Neuro checks every shift   -Palliative consulted   -restart Lexapro and gabapentin  -CT head No acute finding   -Will continue to monitor for acute encephalitis, currently appears to be clearing with less periods of confusion    Respiratory/ENT:  Ct chest showed multiple nodules on lung in August 23   Moderate to large left sided pleural effusion   -Currently on NC 2L, patient reports breathing easier and productive cough  -Will repeat CXR in case of acute respiratory decompensation for possible re accumulation  -Thoracentesis 1/20 over 1L of hazy straw colored fluid, Lights criteria suggest Transudative    -SP02 >92%, Nasal canula titration   -CT C/A/P A left lower lobe pneumonia is identified. There is extensive airspace consolidation  -continuous pulse ox  -Pulm hygiene and Acapella     Cardiovascular:  Hx of endocarditis   Septic Shock  Chronic Hypotension  Vasculopathy   -Levophed and vasopressin gtt titrate to maintain SBP > 70, plan to wean levo to 0.05 then turn of vasopressin  -Levo weaned down today with overall picture towards improvement of septic state  -Home midodrine 15 mg TID when mental status is appropriate   -Elevated troponins 2/2 shock state   -Continuous cardiac monitoring    GI:  -Renal Low phos Low K assess mental status before feeding    -Flexacil for stools   -BR with senna/colace PRN   -Protonix daily   -Slightly uptrending bilirubin with no signs of jaundice or hepatic failure, Hepatic panel scheduled for am draw    Renal/Volume Status (Intra & Extravascular):  ESRD   Metabolic lactic acidosis-resolved  -Nephrology following for dialysis needs  -Elevated Lactate 2/2 hypoperfusion -resolved  -Avoid Large amounts of fluids   -Daily BMP, Replete electrolytes as indicated for ESRD    -Continue Uloric for gout prophylaxis   -Will receive tablo dialysis goal removal of -1L today, scheduled albumin during tablo    Endocrine  DM2   -SSI Q4h    Infectious Disease:  Hx of MRSA bacteremia with endocarditis  Multiple Wounds with and with out infections  C. Diff infection  -WBC 22.9 -> 21.0  -> 21.2 -> 16.5 Vanc + Zoysn, Possible inadequate source control, consider biopsy of wounds by ID   -Per ID add doxycycline 100mg every 24 hours   -blood cultures No growth day 3  -urine cultures grew Proteus, not suspected as source of sepsis especially in setting of anuresis    -ID Consulted, Appreciate ID consult  -C. Diff & MRSA PCR negative     Heme/Onc:  Anemia of chronic disease   -monitor for s/s anemia  -transfuse for hgb <7  -daily CBC    OBGYN/MSK:  Multiple Wounds with and with out infections  -Surgery consulted   -Wound care consulted for dressing other wounds   -Podiatry consulted  -XR Left ankle not suspicious for osteomyelitis   -XR Right ankle and tib/fib not suspicious for osteomyelitis   -padded pressure points  -ICU skin protocol    Ethics/Code Status:  Full code, revoked previous code status on admission to icu    :  DVT Prophylaxis: SQH  GI Prophylaxis: PPI  Bowel Regimen: senna  Diet: Renal  CVC: R Internal jugular 1/20  [place]: R radial 1/20  Amos: none  Restraints: none  Dispo: Will reamin in ICU    Critical Care Time:  40 minutes spent in preparing to see patient (I.e. review of medical records), evaluation of diagnostics (I.e.  labs, imaging, etc.), documentation, discussing plan of care with patient/ family/ caregiver, and/ or coordination of care with multidisciplinary team. Time does not include completion of procedure time.      Ronald Casey, APRN-CNP

## 2024-01-24 NOTE — NURSING NOTE
TABLO completed at this time. No adverse reactions during run. Patient tolerated well. 1L removed. See flowsheets for documentation. HD line heparin locked and capped.

## 2024-01-24 NOTE — PROGRESS NOTES
CONSULT PROGRESS NOTES    SERVICE DATE: 1/24/2024   SERVICE TIME: 11:45 AM    CONSULTING SERVICE: Nephrology    ASSESSMENT AND PLAN   1.  End-stage renal disease  2.  Hypotension  3.  Hyponatremia  4.  Hypokalemia  5.  Anemia of chronic kidney disease     Dialysis today via [facility] again with low temperature, and will attempt 1 L fluid removal.    She is extremely malnourished with low albumin, low prealbumin, no major muscle mass.  Hyponatremia from volume overload in this patient with oliguria.  For her hypokalemia, use a 3K bath.  Hemoglobin is at goal.  Can use IV albumin with hemodialysis.  Again attempt to wean from pressors with a target systolic blood pressure greater than 70.  Typically, she has no major mental status changes with a blood pressure above 70.  I do feel she is uremic partly explains her delirium/encephalopathy now.  Case discussed with the Dr. Grossman.  Will follow her this week.    Challenging case, lots of major medical problems, profound hypotension requiring pressors.    SUBJECTIVE  INTERVAL HPI: She remains on vasopressin and a decreased dose of Levophed.  She is alert and conversant.  No major concerns today.    MEDICATIONS:  acetaminophen, 650 mg, oral, TID  apixaban, 2.5 mg, oral, BID  doxycylcine, 100 mg, oral, Daily  [Held by provider] doxycycline, 100 mg, intravenous, q24h  escitalopram, 10 mg, oral, Nightly  gabapentin, 100 mg, oral, BID  heparin, 2,000 Units, intra-catheter, After Dialysis  heparin, 2,000 Units, intra-catheter, After Dialysis  insulin lispro, 0-10 Units, subcutaneous, TID with meals  ipratropium-albuteroL, 3 mL, nebulization, q6h  magnesium oxide, 400 mg, oral, BID  midodrine, 15 mg, oral, TID with meals  nystatin, , Topical, BID  nystatin, , Topical, BID  pantoprazole, 40 mg, oral, Daily   Or  pantoprazole, 40 mg, intravenous, Daily  perflutren lipid microspheres, 0.5-10 mL of dilution, intravenous, Once in imaging  perflutren protein A microsphere, 0.5 mL,  intravenous, Once in imaging  piperacillin-tazobactam, 2.25 g, intravenous, q8h  sodium chloride, 3 mL, nebulization, q6h GULSHAN  sulfur hexafluoride microsphr, 2 mL, intravenous, Once in imaging  vancomycin, 125 mg, oral, 4x daily  zinc oxide, 1 Application, Topical, BID       norepinephrine, 0.01-3 mcg/kg/min, Last Rate: 0.15 mcg/kg/min (01/24/24 0953)  vasopressin, 0.03 Units/min, Last Rate: 0.03 Units/min (01/24/24 0944)       PRN medications: dextrose 10 % in water (D10W), dextrose, glucagon, oxygen, sennosides-docusate sodium, vancomycin     OBJECTIVE  PHYSICAL EXAM:   Heart Rate:  [100-119]   Temp:  [36 °C (96.8 °F)-36.2 °C (97.2 °F)]   Resp:  [13-24]   Weight:  [79.5 kg (175 lb 4.3 oz)]   SpO2:  [85 %-100 %]   Body mass index is 32.06 kg/m².  Chronically ill-appearing elderly white woman  Pale skin  Right-sided hand swelling  Left-sided finger amputation  Internal jugular tunneled hemodialysis catheter in place  She has some pretibial edema on both lower extremities  Soft abdomen  No Amos  No obvious joint deformities  Moist mucosa  Hearing seems to be intact  Phonation intact  Arterial line placed  Right internal jugular triple-lumen catheter in place    DATA:   Labs:  Results for orders placed or performed during the hospital encounter of 01/19/24 (from the past 96 hour(s))   pH, Body Fluid   Result Value Ref Range    pH, Fluid 7.20 See Below   Lactate Dehydrogenase, Fluid   Result Value Ref Range    LD, Fluid 64 Not established. U/L   Glucose, Fluid   Result Value Ref Range    Glucose, Fluid 173 Not established mg/dL   Protein, Total Fluid   Result Value Ref Range    Protein, Total Fluid 1.3 Not established g/dL   Body Fluid Cell Count   Result Value Ref Range    Color, Fluid Straw Colorless, Straw, Yellow    Clarity, Fluid Clear Clear    WBC, Fluid 67 See Comment /uL    RBC, Fluid 1,000 0  /uL /uL   Body Fluid Differential   Result Value Ref Range    Neutrophils %, Manual, Fluid 12 <25 % %    Lymphocytes  %, Manual, Fluid 36 <75 % %    Mono/Macrophages %, Manual, Fluid 52 <70 % %    Eosinophils %, Manual, Fluid 0 0 % %    Basophils %, Manual, Fluid 0 0 % %    Immature Granulocytes %, Manual, Fluid 0 0 % %    Blasts %, Manual, Fluid 0 0 % %    Unclassified Cells %, Manual, Fluid 0 0 % %    Plasma Cells %, Manual, Fluid 0 0 % %    Total Cells Counted, Fluid 100    Triglycerides, Fluid   Result Value Ref Range    Triglycerides, Fluid 33 No established mg/dL   Amylase, Fluid   Result Value Ref Range    Amylase, Fluid <10 Not established. U/L   Procalcitonin   Result Value Ref Range    Procalcitonin 2.12 (H) <=0.07 ng/mL   Blood Gas Venous Full Panel   Result Value Ref Range    POCT pH, Venous 7.30 (L) 7.33 - 7.43 pH    POCT pCO2, Venous 49 41 - 51 mm Hg    POCT pO2, Venous 149 (H) 35 - 45 mm Hg    POCT SO2, Venous 100 (H) 45 - 75 %    POCT Oxy Hemoglobin, Venous 97.3 (H) 45.0 - 75.0 %    POCT Hematocrit Calculated, Venous 35.0 (L) 36.0 - 46.0 %    POCT Sodium, Venous 129 (L) 136 - 145 mmol/L    POCT Potassium, Venous 3.7 3.5 - 5.3 mmol/L    POCT Chloride, Venous 96 (L) 98 - 107 mmol/L    POCT Ionized Calicum, Venous 1.15 1.10 - 1.33 mmol/L    POCT Glucose, Venous 187 (H) 74 - 99 mg/dL    POCT Lactate, Venous 2.0 0.4 - 2.0 mmol/L    POCT Base Excess, Venous -2.6 (L) -2.0 - 3.0 mmol/L    POCT HCO3 Calculated, Venous 24.1 22.0 - 26.0 mmol/L    POCT Hemoglobin, Venous 11.5 (L) 12.0 - 16.0 g/dL    POCT Anion Gap, Venous 13.0 10.0 - 25.0 mmol/L    Patient Temperature 37.0 degrees Celsius    FiO2 30 %   Lactate Dehydrogenase   Result Value Ref Range    LDH     Protein, Total   Result Value Ref Range    Total Protein 5.4 (L) 5.9 - 7.9 g/dL   POCT GLUCOSE   Result Value Ref Range    POCT Glucose 129 (H) 74 - 99 mg/dL   POCT GLUCOSE   Result Value Ref Range    POCT Glucose 192 (H) 74 - 99 mg/dL   CBC and Auto Differential   Result Value Ref Range    WBC 21.0 (H) 4.4 - 11.3 x10*3/uL    nRBC 0.0 0.0 - 0.0 /100 WBCs    RBC 3.93 (L)  4.00 - 5.20 x10*6/uL    Hemoglobin 11.6 (L) 12.0 - 16.0 g/dL    Hematocrit 36.5 36.0 - 46.0 %    MCV 93 80 - 100 fL    MCH 29.5 26.0 - 34.0 pg    MCHC 31.8 (L) 32.0 - 36.0 g/dL    RDW 24.2 (H) 11.5 - 14.5 %    Platelets 424 150 - 450 x10*3/uL    Neutrophils % 77.0 40.0 - 80.0 %    Immature Granulocytes %, Automated 0.9 0.0 - 0.9 %    Lymphocytes % 12.9 13.0 - 44.0 %    Monocytes % 7.8 2.0 - 10.0 %    Eosinophils % 0.9 0.0 - 6.0 %    Basophils % 0.5 0.0 - 2.0 %    Neutrophils Absolute 16.18 (H) 1.60 - 5.50 x10*3/uL    Immature Granulocytes Absolute, Automated 0.19 0.00 - 0.50 x10*3/uL    Lymphocytes Absolute 2.71 0.80 - 3.00 x10*3/uL    Monocytes Absolute 1.63 (H) 0.05 - 0.80 x10*3/uL    Eosinophils Absolute 0.19 0.00 - 0.40 x10*3/uL    Basophils Absolute 0.10 0.00 - 0.10 x10*3/uL   Magnesium   Result Value Ref Range    Magnesium 1.50 (L) 1.60 - 3.10 mg/dL   Prealbumin   Result Value Ref Range    Prealbumin <3.0 (L) 18.0 - 40.0 mg/dL   Comprehensive metabolic panel   Result Value Ref Range    Glucose 209 (H) 65 - 99 mg/dL    Sodium 133 133 - 145 mmol/L    Potassium 3.9 3.4 - 5.1 mmol/L    Chloride 97 97 - 107 mmol/L    Bicarbonate 23 (L) 24 - 31 mmol/L    Urea Nitrogen 24 8 - 25 mg/dL    Creatinine 3.90 (H) 0.40 - 1.60 mg/dL    eGFR 12 (L) >60 mL/min/1.73m*2    Calcium 8.7 8.5 - 10.4 mg/dL    Albumin 2.1 (L) 3.5 - 5.0 g/dL    Alkaline Phosphatase 113 35 - 125 U/L    Total Protein 5.5 (L) 5.9 - 7.9 g/dL    AST 19 5 - 40 U/L    Bilirubin, Total 1.0 0.1 - 1.2 mg/dL    ALT 11 5 - 40 U/L    Anion Gap 13 <=19 mmol/L   Phosphorus   Result Value Ref Range    Phosphorus 4.2 2.5 - 4.5 mg/dL   Morphology   Result Value Ref Range    RBC Morphology See Below     Target Cells Few     Ovalocytes Few     Stuart Cells Few    POCT GLUCOSE   Result Value Ref Range    POCT Glucose 200 (H) 74 - 99 mg/dL   POCT GLUCOSE   Result Value Ref Range    POCT Glucose 156 (H) 74 - 99 mg/dL   POCT GLUCOSE   Result Value Ref Range    POCT Glucose 174  (H) 74 - 99 mg/dL   POCT GLUCOSE   Result Value Ref Range    POCT Glucose 170 (H) 74 - 99 mg/dL   POCT GLUCOSE   Result Value Ref Range    POCT Glucose 181 (H) 74 - 99 mg/dL   C. difficile, PCR    Specimen: Stool   Result Value Ref Range    C. difficile, PCR Not Detected Not Detected   CBC and Auto Differential   Result Value Ref Range    WBC 21.2 (H) 4.4 - 11.3 x10*3/uL    nRBC 0.2 (H) 0.0 - 0.0 /100 WBCs    RBC 4.11 4.00 - 5.20 x10*6/uL    Hemoglobin 12.0 12.0 - 16.0 g/dL    Hematocrit 37.5 36.0 - 46.0 %    MCV 91 80 - 100 fL    MCH 29.2 26.0 - 34.0 pg    MCHC 32.0 32.0 - 36.0 g/dL    RDW 24.4 (H) 11.5 - 14.5 %    Platelets 355 150 - 450 x10*3/uL    Neutrophils % 74.6 40.0 - 80.0 %    Immature Granulocytes %, Automated 1.1 (H) 0.0 - 0.9 %    Lymphocytes % 14.6 13.0 - 44.0 %    Monocytes % 6.1 2.0 - 10.0 %    Eosinophils % 3.2 0.0 - 6.0 %    Basophils % 0.4 0.0 - 2.0 %    Neutrophils Absolute 15.81 (H) 1.60 - 5.50 x10*3/uL    Immature Granulocytes Absolute, Automated 0.23 0.00 - 0.50 x10*3/uL    Lymphocytes Absolute 3.09 (H) 0.80 - 3.00 x10*3/uL    Monocytes Absolute 1.29 (H) 0.05 - 0.80 x10*3/uL    Eosinophils Absolute 0.68 (H) 0.00 - 0.40 x10*3/uL    Basophils Absolute 0.09 0.00 - 0.10 x10*3/uL   Comprehensive metabolic panel   Result Value Ref Range    Glucose 159 (H) 65 - 99 mg/dL    Sodium 127 (L) 133 - 145 mmol/L    Potassium 3.6 3.4 - 5.1 mmol/L    Chloride 92 (L) 97 - 107 mmol/L    Bicarbonate 21 (L) 24 - 31 mmol/L    Urea Nitrogen 27 (H) 8 - 25 mg/dL    Creatinine 4.20 (H) 0.40 - 1.60 mg/dL    eGFR 11 (L) >60 mL/min/1.73m*2    Calcium 8.6 8.5 - 10.4 mg/dL    Albumin 1.9 (L) 3.5 - 5.0 g/dL    Alkaline Phosphatase 106 35 - 125 U/L    Total Protein 5.2 (L) 5.9 - 7.9 g/dL    AST 17 5 - 40 U/L    Bilirubin, Total 0.9 0.1 - 1.2 mg/dL    ALT 10 5 - 40 U/L    Anion Gap 14 <=19 mmol/L   Magnesium   Result Value Ref Range    Magnesium 1.90 1.60 - 3.10 mg/dL   Phosphorus   Result Value Ref Range    Phosphorus 4.0 2.5  - 4.5 mg/dL   Morphology   Result Value Ref Range    RBC Morphology See Below     Target Cells Few     Ovalocytes Few     [place] Cells Few    POCT GLUCOSE   Result Value Ref Range    POCT Glucose 109 (H) 74 - 99 mg/dL   MRSA Surveillance for Vancomycin De-escalation, PCR    Specimen: Anterior Nares; Swab   Result Value Ref Range    MRSA PCR Not Detected Not Detected   POCT GLUCOSE   Result Value Ref Range    POCT Glucose 121 (H) 74 - 99 mg/dL   Sedimentation rate, automated   Result Value Ref Range    Sedimentation Rate 49 (H) 0 - 30 mm/h   POCT GLUCOSE   Result Value Ref Range    POCT Glucose 128 (H) 74 - 99 mg/dL   Transthoracic Echo (TTE) Complete   Result Value Ref Range    AV pk david 0.84 m/s    LVOT diam 1.90 cm    MV E/A ratio 1.32     LVIDd 3.66 cm    AV pk grad 2.8 mmHg    Aortic Valve Area by Continuity of Peak Velocity 1.57 cm2    LV A4C EF 68.3    POCT GLUCOSE   Result Value Ref Range    POCT Glucose 145 (H) 74 - 99 mg/dL   CBC and Auto Differential   Result Value Ref Range    WBC 16.5 (H) 4.4 - 11.3 x10*3/uL    nRBC 0.2 (H) 0.0 - 0.0 /100 WBCs    RBC 3.62 (L) 4.00 - 5.20 x10*6/uL    Hemoglobin 10.7 (L) 12.0 - 16.0 g/dL    Hematocrit 32.4 (L) 36.0 - 46.0 %    MCV 90 80 - 100 fL    MCH 29.6 26.0 - 34.0 pg    MCHC 33.0 32.0 - 36.0 g/dL    RDW 24.0 (H) 11.5 - 14.5 %    Platelets 233 150 - 450 x10*3/uL    Neutrophils % 74.9 40.0 - 80.0 %    Immature Granulocytes %, Automated 0.7 0.0 - 0.9 %    Lymphocytes % 14.4 13.0 - 44.0 %    Monocytes % 6.6 2.0 - 10.0 %    Eosinophils % 3.0 0.0 - 6.0 %    Basophils % 0.4 0.0 - 2.0 %    Neutrophils Absolute 12.32 (H) 1.60 - 5.50 x10*3/uL    Immature Granulocytes Absolute, Automated 0.12 0.00 - 0.50 x10*3/uL    Lymphocytes Absolute 2.37 0.80 - 3.00 x10*3/uL    Monocytes Absolute 1.08 (H) 0.05 - 0.80 x10*3/uL    Eosinophils Absolute 0.50 (H) 0.00 - 0.40 x10*3/uL    Basophils Absolute 0.07 0.00 - 0.10 x10*3/uL   Comprehensive metabolic panel   Result Value Ref Range    Glucose  124 (H) 65 - 99 mg/dL    Sodium 128 (L) 133 - 145 mmol/L    Potassium 3.9 3.4 - 5.1 mmol/L    Chloride 93 (L) 97 - 107 mmol/L    Bicarbonate 22 (L) 24 - 31 mmol/L    Urea Nitrogen 15 8 - 25 mg/dL    Creatinine 2.60 (H) 0.40 - 1.60 mg/dL    eGFR 19 (L) >60 mL/min/1.73m*2    Calcium 8.0 (L) 8.5 - 10.4 mg/dL    Albumin 2.6 (L) 3.5 - 5.0 g/dL    Alkaline Phosphatase 119 35 - 125 U/L    Total Protein 5.1 (L) 5.9 - 7.9 g/dL    AST 16 5 - 40 U/L    Bilirubin, Total 1.6 (H) 0.1 - 1.2 mg/dL    ALT 10 5 - 40 U/L    Anion Gap 13 <=19 mmol/L   Magnesium   Result Value Ref Range    Magnesium 1.70 1.60 - 3.10 mg/dL   Phosphorus   Result Value Ref Range    Phosphorus 2.3 (L) 2.5 - 4.5 mg/dL   Morphology   Result Value Ref Range    RBC Morphology See Below     Target Cells Few     Ovalocytes Few     Bhavesh Cells Few    POCT GLUCOSE   Result Value Ref Range    POCT Glucose 95 74 - 99 mg/dL   POCT GLUCOSE   Result Value Ref Range    POCT Glucose 146 (H) 74 - 99 mg/dL   POCT GLUCOSE   Result Value Ref Range    POCT Glucose 73 (L) 74 - 99 mg/dL   CBC and Auto Differential   Result Value Ref Range    WBC 17.8 (H) 4.4 - 11.3 x10*3/uL    nRBC 0.2 (H) 0.0 - 0.0 /100 WBCs    RBC 3.68 (L) 4.00 - 5.20 x10*6/uL    Hemoglobin 10.7 (L) 12.0 - 16.0 g/dL    Hematocrit 32.5 (L) 36.0 - 46.0 %    MCV 88 80 - 100 fL    MCH 29.1 26.0 - 34.0 pg    MCHC 32.9 32.0 - 36.0 g/dL    RDW 23.9 (H) 11.5 - 14.5 %    Platelets 226 150 - 450 x10*3/uL    Neutrophils % 69.5 40.0 - 80.0 %    Immature Granulocytes %, Automated 1.1 (H) 0.0 - 0.9 %    Lymphocytes % 19.5 13.0 - 44.0 %    Monocytes % 7.2 2.0 - 10.0 %    Eosinophils % 2.1 0.0 - 6.0 %    Basophils % 0.6 0.0 - 2.0 %    Neutrophils Absolute 12.37 (H) 1.60 - 5.50 x10*3/uL    Immature Granulocytes Absolute, Automated 0.19 0.00 - 0.50 x10*3/uL    Lymphocytes Absolute 3.47 (H) 0.80 - 3.00 x10*3/uL    Monocytes Absolute 1.28 (H) 0.05 - 0.80 x10*3/uL    Eosinophils Absolute 0.38 0.00 - 0.40 x10*3/uL    Basophils  Absolute 0.10 0.00 - 0.10 x10*3/uL   Comprehensive metabolic panel   Result Value Ref Range    Glucose 155 (H) 65 - 99 mg/dL    Sodium 129 (L) 133 - 145 mmol/L    Potassium 3.8 3.4 - 5.1 mmol/L    Chloride 93 (L) 97 - 107 mmol/L    Bicarbonate 18 (L) 24 - 31 mmol/L    Urea Nitrogen 17 8 - 25 mg/dL    Creatinine 3.10 (H) 0.40 - 1.60 mg/dL    eGFR 16 (L) >60 mL/min/1.73m*2    Calcium 8.0 (L) 8.5 - 10.4 mg/dL    Albumin 2.2 (L) 3.5 - 5.0 g/dL    Alkaline Phosphatase 89 35 - 125 U/L    Total Protein 4.6 (L) 5.9 - 7.9 g/dL    AST 24 5 - 40 U/L    Bilirubin, Total 1.7 (H) 0.1 - 1.2 mg/dL    ALT 9 5 - 40 U/L    Anion Gap 18 <=19 mmol/L   Magnesium   Result Value Ref Range    Magnesium 1.60 1.60 - 3.10 mg/dL   Phosphorus   Result Value Ref Range    Phosphorus 2.7 2.5 - 4.5 mg/dL   Vancomycin   Result Value Ref Range    Vancomycin 19.2 10.0 - 20.0 ug/mL   Morphology   Result Value Ref Range    RBC Morphology See Below     Polychromasia Mild     Target Cells Few     Bhavesh Cells Many     Pappenheimer Bodies Present    TSH   Result Value Ref Range    Thyroid Stimulating Hormone 6.10 (H) 0.27 - 4.20 mIU/L   POCT GLUCOSE   Result Value Ref Range    POCT Glucose 144 (H) 74 - 99 mg/dL         SIGNATURE: Saeed Mondragon MD PATIENT NAME: Ayanna Gross   DATE: January 24, 2024 MRN: [id number]   TIME: 11:45 AM PAGER: [phone]

## 2024-01-24 NOTE — NURSING NOTE
Patient with Rt internal jugular TLC, dressing D&I, all lumens in use. Rt chest dialysis catheters, dressing D&I.

## 2024-01-24 NOTE — PROGRESS NOTES
Ayanna Gross is a 73 y.o. female on day 5 of admission presenting with Septic shock (CMS/HCC).    Subjective   Interval History:   Afebrile  Awake, responsive  Remains on pressors  Reports soreness to sacral area-requesting to be repositioned  No shortness of breath  Family at bedside  On levophed/vasopressin      Review of Systems   All other systems reviewed and are negative.      Objective   Range of Vitals (last 24 hours)  Heart Rate:  [0-144]   Temp:  [36 °C (96.8 °F)-36.2 °C (97.2 °F)]   Resp:  [13-25]   Weight:  [79.5 kg (175 lb 4.3 oz)]   SpO2:  [85 %-100 %]   Daily Weight  01/24/24 : 79.5 kg (175 lb 4.3 oz)    Body mass index is 32.06 kg/m².    Physical Exam  Constitutional:       Appearance: Normal appearance.   HENT:      Head: Normocephalic and atraumatic.      Nose: Nose normal.   Eyes:      Extraocular Movements: Extraocular movements intact.      Conjunctiva/sclera: Conjunctivae normal.   Cardiovascular:      Heart sounds: Normal heart sounds, S1 normal and S2 normal.   Pulmonary:      Breath sounds: Decreased breath sounds present.   Abdominal:      General: Bowel sounds are normal.      Palpations: Abdomen is soft.   Musculoskeletal:      Cervical back: Normal range of motion and neck supple.   Skin:     Comments: Stage III sacral ulcer, bilateral posterior heel eschar -photos examined  Neurological:      Mental Status: She is alert.     Antibiotics  aspirin tablet 325 mg  acetaminophen (Tylenol) tablet 650 mg  cefepime (Maxipime) 1 g in dextrose 5 % 50 mL IV  sodium chloride 0.9 % bolus 2,229 mL  apixaban (Eliquis) tablet 2.5 mg  escitalopram (Lexapro) tablet 10 mg  febuxostat (Uloric) tablet 40 mg  fenofibrate (Triglide) tablet 160 mg  gabapentin (Neurontin) capsule 100 mg  midodrine (Proamatine) tablet 15 mg  nystatin (Mycostatin) 100,000 unit/gram powder  oxyCODONE-acetaminophen (Percocet) 5-325 mg per tablet 1 tablet  simvastatin (Zocor) tablet 20 mg  piperacillin-tazobactam-dextrose (Zosyn)  IV 2.25 g  vancomycin (Vancocin) capsule 125 mg  oxygen (O2) therapy  dextrose 50 % injection 25 g  glucagon (Glucagen) injection 1 mg  dextrose 10 % in water (D10W) infusion  insulin lispro (HumaLOG) injection 0-10 Units  nystatin (Mycostatin) 100,000 unit/gram powder 1 Application  vancomycin-diluent combo no.1 (Xellia) IVPB 1,750 mg  piperacillin-tazobactam-dextrose (Zosyn) IV 2.25 g  sodium chloride 0.9 % bolus 500 mL  norepinephrine (Levophed) 8 mg in dextrose 5% 250 mL (0.032 mg/mL) infusion (premix)  vancomycin (Vancocin) placeholder  pantoprazole (ProtoNix) EC tablet 40 mg  pantoprazole (ProtoNix) injection 40 mg  sennosides-docusate sodium (Judy-Colace) 8.6-50 mg per tablet 1 tablet  doxycycline (Vibramycin) in dextrose 5 % in water (D5W) 100 mL  mg  LORazepam (Ativan) injection 2 mg  magnesium sulfate IV 2 g  zinc oxide 20 % ointment 1 Application  nystatin (Mycostatin) cream  norepinephrine (Levophed) 8 mg in dextrose 5% 250 mL (0.032 mg/mL) infusion (premix)  heparin 1,000 unit/mL injection 2,000 Units  heparin 1,000 unit/mL injection 2,000 Units  nystatin (Mycostatin) ointment  acetaminophen (Tylenol) oral liquid 1,000 mg  albumin human 25 % solution 12.5 g  perflutren lipid microspheres (Definity) injection 0.5-10 mL of dilution  sulfur hexafluoride microsphr (Lumason) injection 24.28 mg  perflutren protein A microsphere (Optison) injection 0.5 mL  ipratropium-albuteroL (Duo-Neb) 0.5-2.5 mg/3 mL nebulizer solution 3 mL  sodium chloride 3 % nebulizer solution 3 mL  vancomycin-diluent combo no.1 (Xellia) IVPB 750 mg  sennosides-docusate sodium (Judy-Colace) 8.6-50 mg per tablet 1 tablet  acetaminophen (Tylenol) tablet 650 mg      Relevant Results  Labs  Results from last 72 hours   Lab Units 01/24/24  0500 01/23/24  0419 01/22/24  0440   WBC AUTO x10*3/uL 17.8* 16.5* 21.2*   HEMOGLOBIN g/dL 10.7* 10.7* 12.0   HEMATOCRIT % 32.5* 32.4* 37.5   PLATELETS AUTO x10*3/uL 226 233 355   NEUTROS PCT AUTO %  69.5 74.9 74.6   LYMPHS PCT AUTO % 19.5 14.4 14.6   MONOS PCT AUTO % 7.2 6.6 6.1   EOS PCT AUTO % 2.1 3.0 3.2       Results from last 72 hours   Lab Units 01/24/24  0500 01/23/24  0419 01/22/24  0440   SODIUM mmol/L 129* 128* 127*   POTASSIUM mmol/L 3.8 3.9 3.6   CHLORIDE mmol/L 93* 93* 92*   CO2 mmol/L 18* 22* 21*   BUN mg/dL 17 15 27*   CREATININE mg/dL 3.10* 2.60* 4.20*   GLUCOSE mg/dL 155* 124* 159*   CALCIUM mg/dL 8.0* 8.0* 8.6   ANION GAP mmol/L 18 13 14   EGFR mL/min/1.73m*2 16* 19* 11*   PHOSPHORUS mg/dL 2.7 2.3* 4.0       Results from last 72 hours   Lab Units 01/24/24  0500 01/23/24  0419 01/22/24  0440   ALK PHOS U/L 89 119 106   BILIRUBIN TOTAL mg/dL 1.7* 1.6* 0.9   PROTEIN TOTAL g/dL 4.6* 5.1* 5.2*   ALT U/L 9 10 10   AST U/L 24 16 17   ALBUMIN g/dL 2.2* 2.6* 1.9*       Estimated Creatinine Clearance: 16.3 mL/min (A) (by C-G formula based on SCr of 3.1 mg/dL (H)).  C-Reactive Protein   Date Value Ref Range Status   12/25/2023 5.20 (H) 0.00 - 2.00 mg/dL Final     CRP   Date Value Ref Range Status   06/23/2023 19.9 (H) 0 - 2.0 MG/DL Final     Comment:     Performed at [address] [address]   05/22/2022 1.0 0 - 2.0 MG/DL Final     Comment:     Performed at [address] [address]     Microbiology  Susceptibility data from last 14 days.  Collected Specimen Info Organism Ampicillin Cefazolin Cefazolin (uncomplicated UTIs only) Ciprofloxacin Gentamicin Piperacillin/Tazobactam Tetracycline Trimethoprim/Sulfamethoxazole   01/19/24 Urine from Indwelling (Amos) Catheter Proteus mirabilis S S S S S S R S     Reviewed  Imaging  XR tibia fibula right 2 views    Result Date: 1/22/2024  Interpreted By:  Real Mendieta, STUDY: XR TIBIA FIBULA RIGHT 2 VIEWS; 1/22/2024 2:15 pm   INDICATION: Signs/Symptoms:evaluate source of infection, osteomyelitis;   COMPARISON: None available.   ACCESSION NUMBER(S): [id number]   ORDERING CLINICIAN: BAN GIFFORD   TECHNIQUE: Views: AP  and Lateral of the right tibia and fibula   FINDINGS: RESULT: There is no evidence for fracture or dislocation. Tricompartmental degenerative changes of the right knee. The tibia and fibula appear intact without bony erosion or evidence for osteomyelitis. No other bony or soft tissue abnormality is identified. No subcutaneous gas is noted.       No evidence for acute osseous abnormality. No bony erosion to suggest osteomyelitis.   Signed by: Real Mendieta 1/22/2024 3:15 PM Dictation workstation:   [id number]    XR ankle right 3+ views    Result Date: 1/22/2024  Interpreted By:  Real Mendieta, STUDY: XR ANKLE RIGHT 3+ VIEWS; 1/22/2024 2:15 pm   INDICATION: Signs/Symptoms:Evuluate source of infection, osteomyelitis;   COMPARISON: None available.   ACCESSION NUMBER(S): [id number]   ORDERING CLINICIAN: BAN GIFFORD   TECHNIQUE: Views: AP, Lateral, Oblique, right ankle   FINDINGS: RESULT: There is no evidence for fracture or dislocation. The ankle mortise is intact. Joint spaces appear adequately maintained. No bony erosion to suggest osteomyelitis. No bone lesion or soft tissue abnormality is identified. No subcutaneous gas is seen.       No evidence for acute osseous abnormality. No bony erosion to suggest osteomyelitis.   Signed by: Real Mendieta 1/22/2024 3:14 PM Dictation workstation:   [id number]    Transthoracic Echo (TTE) Complete    Result Date: 1/22/2024           [place] [address]            Phone [phone] TRANSTHORACIC ECHOCARDIOGRAM REPORT  Patient Name:      BASIA Gray Physician:   [id number] Fausto Rowe DO Study Date:        1/22/2024           Ordering Provider:   [id number] ERI BERMAN MRN/PID:           [id number]            Fellow: Accession#:        [id number]        Nurse: Date of Birth/Age: 1950 / 73 years Sonographer:         Tabatha Page RDCS Gender:             F                   Additional Staff: Height:            157.00 cm           Admit Date: Weight:            75.00 kg            Admission Status:    Inpatient - Routine BSA:               1.76 m2             Department Location: [facility] ICU Blood Pressure: 88 /49 mmHg Study Type:    TRANSTHORACIC ECHO (TTE) COMPLETE Diagnosis/ICD: Chronic combined systolic (congestive) and diastolic (congestive)                heart failure (CHF)-I50.42; Sepsis, unspecified organism-A41.9 Indication:    heart failure,septic shock CPT Codes:     Echo Complete w Full Doppler-[id number] Patient History: BMI:               Obese >30 Pertinent History: Sepsis, PVD, A-Fib, CVA, Cancer and HTN. breast                    prosthesis/ca, ESRD,anemia,septic shock,heart failure. Study Detail: The following Echo studies were performed: 2D, M-Mode, Doppler and               color flow. Technically challenging study due to prosthesis,               prominent lung artifact and body habitus.  PHYSICIAN INTERPRETATION: Left Ventricle: Left ventricular systolic function is normal, with an estimated ejection fraction of 70%. There are no regional wall motion abnormalities. The left ventricular cavity size is normal. Left ventricular diastolic filling was indeterminate. Left Atrium: The left atrium is moderately dilated. Right Ventricle: The right ventricle is normal in size. There is normal right ventricular global systolic function. Right Atrium: The right atrium is normal in size. Aortic Valve: The aortic valve is trileaflet. There is no evidence of aortic valve regurgitation. The peak instantaneous gradient of the aortic valve is 2.8 mmHg. Mitral Valve: The mitral valve is normal in structure. There is no evidence of mitral valve regurgitation. Tricuspid Valve: The tricuspid valve is structurally normal. There is trace tricuspid regurgitation. Pulmonic Valve: The pulmonic valve is not well visualized. The pulmonic valve regurgitation was not well  visualized. Pericardium: There is no pericardial effusion noted. Aorta: The aortic root is normal.  CONCLUSIONS:  1. Left ventricular systolic function is normal with a 70% estimated ejection fraction.  2. The left atrium is moderately dilated.  3. Left ventricular diastolic filling indeterminate. QUANTITATIVE DATA SUMMARY: 2D MEASUREMENTS:                          Normal Ranges: LAs:           4.50 cm   (2.7-4.0cm) IVSd:          0.61 cm   (0.6-1.1cm) LVPWd:         0.88 cm   (0.6-1.1cm) LVIDd:         3.66 cm   (3.9-5.9cm) LV Mass Index: 41.9 g/m2 LV SYSTOLIC FUNCTION BY 2D PLANIMETRY (MOD):                     Normal Ranges: EF-A4C View: 68.3 % (>=55%) LV DIASTOLIC FUNCTION:                     Normal Ranges: MV Peak E: 1.19 m/s (0.7-1.2 m/s) MV Peak A: 0.90 m/s (0.42-0.7 m/s) E/A Ratio: 1.32     (1.0-2.2) MITRAL VALVE:                 Normal Ranges: MV DT: 192 msec (150-240msec) AORTIC VALVE:                         Normal Ranges: AoV Vmax:      0.84 m/s (<=1.7m/s) AoV Peak P.8 mmHg (<20mmHg) LVOT Max Shimon:  0.47 m/s (<=1.1m/s) LVOT Diameter: 1.90 cm  (1.8-2.4cm) AoV Area,Vmax: 1.57 cm2 (2.5-4.5cm2) TRICUSPID VALVE/RVSP:                   Normal Ranges: IVC Diam: 1.05 cm  [id number] [facility] Electronically signed on 2024 at 2:53:04 PM  ** Final **    Assessment/Plan   Septic shock  Left-sided pleural effusion   Left lower lobe pneumonia  Proteus urinary tract infection  History of MRSA endocarditis  History of C. 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

## 2024-01-24 NOTE — CONSULTS
Consults    Reason For Consult  Bilateral lower extremity ulcerations.     History Of Present Illness  Ayanna Gross is a 73 y.o. female presenting with bilateral heel sores secondary to pressure.  Left medial leg wound secondary to pressure.  Patient has been offloading the affected wounds with offloading boots.  No other pedal complaints at this time.     Past Medical History  She has a past medical history of Asthma, CAD (coronary artery disease), CHF (congestive heart failure) (CMS/[facility]), Chronic kidney disease on chronic dialysis (CMS/[facility]), Hypertension, Personal history of diseases of the blood and blood-forming organs and certain disorders involving the immune mechanism, Sleep apnea, and Type 2 diabetes mellitus without complications (CMS/[facility]) (09/15/2022).    Surgical History  She has a past surgical history that includes Other surgical history (11/18/2013); Carpal tunnel release (11/18/2013); Umbilical hernia repair (11/18/2013); Tonsillectomy (11/18/2013); Hand surgery (11/18/2013); Other surgical history (11/18/2013); Other surgical history (11/18/2013); Other surgical history (04/04/2022); Other surgical history (04/04/2022); Other surgical history (04/04/2022); Mastectomy, partial (04/10/2014); [facility] lymph node biopsy (04/10/2014); US guided percutaneous placement (6/29/2023); and MR angio head wo IV contrast (8/29/2023).     Social History  She reports that she has never smoked. She has never used smokeless tobacco. She reports that she does not currently use alcohol. She reports that she does not use drugs.    Family History  Family History   Problem Relation Name Age of Onset    Lymphoma Mother      Prostate cancer Father          passed away fabiana eve 2017        Allergies  Patient has no known allergies.    Review of Systems    REVIEW OF SYSTEMS  GENERAL:  Negative for malaise, significant weight loss, fever  HEENT:  No changes in hearing or vision, no nose bleeds or other nasal problems and  "Negative for frequent or significant headaches  NECK:  Negative for lumps, goiter, pain and significant neck swelling  RESPIRATORY:  Negative for cough, wheezing and shortness of breath  CARDIOVASCULAR:  Negative for chest pain, leg swelling and palpitations  GI:  Negative for abdominal discomfort, blood in stools or black stools and change in bowel habits  :  Negative for dysuria, frequency and incontinence  MUSCULOSKELETAL:  Negative for joint pain or swelling, back pain, and muscle pain.  SKIN:  Negative for lesions, rash, and itching  PSYCH:  Negative for sleep disturbance, mood disorder and recent psychosocial stressors  HEMATOLOGY/LYMPHOLOGY:  Negative for prolonged bleeding, bruising easily, and swollen nodes.  ENDOCRINE:  Negative for cold or heat intolerance, polyuria, polydipsia and goiter  NEURO: Negative, denies any burning, tingling or numbness     Objective:   Vasc: DP and PT diminished b/l.  CFT is less than 3 seconds bilateral.  Skin temperature is warm to cool proximal to distal bilateral.      Neuro:  Light touch is intact to the foot bilateral.  Protective sensation is intact to the foot when tested with the 5.07 SWM bilateral.  There is no clonus noted.  The hallux is downgoing bilateral.      Derm: Full-thickness heel ulcerations appreciable to the right and left lower extremity.  Overlying wound bed is covered with eschar that is firm and well adhered.  No evidence of periwound erythema warmth or appreciable drainage.  No evidence of proximal lymphangitic streaking.       Physical Exam     Last Recorded Vitals  Blood pressure 85/65, pulse 94, temperature 37.1 °C (98.8 °F), temperature source Temporal, resp. rate 22, height 1.575 m (5' 2\"), weight 79.5 kg (175 lb 4.3 oz), SpO2 91 %.    Relevant Results       Assessment/Plan     Full-thickness bilateral heel ulcerations level fat fascia  Peripheral vascular disease  Type 2 diabetes     Patient seen and evaluated I evaluated the heel ulcerations " to the right and left lower extremity.  At this time the eschar is well adhered and intact.  There is no evidence of periwound erythema or drainage there is no evidence of proximal lymphangitic streaking.  I recommend that the affected heel ulcers are continued offloaded in a PRAFO offloading boot.  I recommend daily application of iodine and dry dressing to maintain the integrity of the eschar.    The multiple sources of infection including UTI, pneumonia I do not believe at this time that the wounds are contributing to the patient's leukocytosis.    If wounds continue to deteriorate or new onset redness drainage malodor or swelling occur we will take emergently for operative I&D of the affected wounds.  At this time there are no active surgical plans.    Will continue to follow the patient for the course of the admission      I spent 20 minutes in the professional and overall care of this patient.

## 2024-01-24 NOTE — PROGRESS NOTES
"Ayanna Gross is a 73 y.o. female on day 5 of admission presenting with Septic shock (CMS/HCC).    Subjective   Remains on Levophed and vasopressin. For TABLO today.     Objective     Physical Exam  Constitutional:       Appearance: She is ill-appearing.   HENT:      Head: Normocephalic and atraumatic.   Cardiovascular:      Rate and Rhythm: Normal rate.   Pulmonary:      Effort: Pulmonary effort is normal.   Abdominal:      General: Abdomen is flat. Bowel sounds are normal.   Skin:     General: Skin is warm and dry.      Findings: Rash present.      Comments: Heel wounds with eschar. Sacral rash red and present to b/l buttocks and sacrum.    Neurological:      Mental Status: She is alert. She is disoriented.      Motor: Weakness present.         Last Recorded Vitals  Blood pressure 85/65, pulse (!) 111, temperature 36 °C (96.8 °F), temperature source Temporal, resp. rate 16, height 1.575 m (5' 2\"), weight 79.5 kg (175 lb 4.3 oz), SpO2 94 %.  Intake/Output last 3 Shifts:  I/O last 3 completed shifts:  In: 2093.7 (26.3 mL/kg) [P.O.:290; I.V.:1553.7 (19.5 mL/kg); IV Piggyback:250]  Out: 500 (6.3 mL/kg) [Stool:500]  Weight: 79.5 kg     Relevant Results  Lab Results   Component Value Date    WBC 17.8 (H) 01/24/2024    HGB 10.7 (L) 01/24/2024    HCT 32.5 (L) 01/24/2024    MCV 88 01/24/2024     01/24/2024     Lab Results   Component Value Date    GLUCOSE 155 (H) 01/24/2024    CALCIUM 8.0 (L) 01/24/2024     (L) 01/24/2024    K 3.8 01/24/2024    CO2 18 (L) 01/24/2024    CL 93 (L) 01/24/2024    BUN 17 01/24/2024    CREATININE 3.10 (H) 01/24/2024       Assessment/Plan   Principal Problem:    Septic shock (CMS/HCC)  Active Problems:    End stage renal disease (CMS/HCC)    Anemia due to blood loss, acute  1/24: Continue with nystatin and zinc paste to sacral area. Vascular saw patient and she is not a candidate for surgical intervention to heel wounds due to high dose pressor requirement. I do not see any recs " from podiatry; however, per wound care RN note, there was a possibility of debridement today. I have reached out to podiatry. For now, continue to paint with betadine and cover with kerlex.    1/23: Moisture associated sacral rash washed with soap and water and a combination of triad and nystatin powder applied. Podiatry is currently present at the bedside, awaiting recs for heel wounds. Will follow for wound care.     1/22:Heel wounds to be evaluated by podiatry for possible debridement. For sacral dermatitis, clean with soap and water, dry completely and apply mixture of zinc oxide and nystatin.        I spent 15 minutes in the professional and overall care of this patient.      Nickolas Gallego, APRN-CNP

## 2024-01-24 NOTE — CARE PLAN
Problem: Pain  Goal: My pain/discomfort is manageable  Outcome: Progressing     Problem: Safety  Goal: Patient will be injury free during hospitalization  Outcome: Progressing  Goal: I will remain free of falls  Outcome: Progressing     Problem: Daily Care  Goal: Daily care needs are met  Outcome: Progressing     Problem: Psychosocial Needs  Goal: Demonstrates ability to cope with hospitalization/illness  Outcome: Progressing  Goal: Collaborate with me, my family, and caregiver to identify my specific goals  Outcome: Progressing     Problem: Discharge Barriers  Goal: My discharge needs are met  Outcome: Progressing     Problem: Fall/Injury  Goal: Not fall by end of shift  Outcome: Progressing  Goal: Be free from injury by end of the shift  Outcome: Progressing  Goal: Verbalize understanding of personal risk factors for fall in the hospital  Outcome: Progressing  Goal: Verbalize understanding of risk factor reduction measures to prevent injury from fall in the home  Outcome: Progressing  Goal: Pace activities to prevent fatigue by end of the shift  Outcome: Progressing     Problem: Skin  Goal: Decreased wound size/increased tissue granulation at next dressing change  Outcome: Progressing  Flowsheets (Taken 1/24/2024 0430)  Decreased wound size/increased tissue granulation at next dressing change:   Promote sleep for wound healing   Protective dressings over bony prominences   Utilize specialty bed per algorithm  Goal: Participates in plan/prevention/treatment measures  Outcome: Progressing  Goal: Prevent/manage excess moisture  Outcome: Progressing  Goal: Prevent/minimize sheer/friction injuries  Outcome: Progressing  Goal: Promote/optimize nutrition  Outcome: Progressing  Goal: Promote skin healing  Outcome: Progressing     Problem: Pain  Goal: Takes deep breaths with improved pain control throughout the shift  Outcome: Progressing  Goal: Turns in bed with improved pain control throughout the shift  Outcome:  Progressing  Goal: Walks with improved pain control throughout the shift  Outcome: Progressing  Goal: Performs ADL's with improved pain control throughout shift  Outcome: Progressing  Goal: Participates in PT with improved pain control throughout the shift  Outcome: Progressing  Goal: Free from opioid side effects throughout the shift  Outcome: Progressing  Goal: Free from acute confusion related to pain meds throughout the shift  Outcome: Progressing     Problem: Respiratory  Goal: Clear secretions with interventions this shift  Outcome: Progressing  Goal: Minimize anxiety/maximize coping throughout shift  Outcome: Progressing  Goal: Minimal/no exertional discomfort or dyspnea this shift  Outcome: Progressing  Goal: No signs of respiratory distress (eg. Use of accessory muscles. Peds grunting)  Outcome: Progressing  Goal: Patent airway maintained this shift  Outcome: Progressing  Goal: Tolerate pulmonary toileting this shift  Outcome: Progressing  Goal: Verbalize decreased shortness of breath this shift  Outcome: Progressing  Goal: Wean oxygen to maintain O2 saturation per order/standard this shift  Outcome: Progressing  Goal: Increase self care and/or family involvement in next 24 hours  Outcome: Progressing     Problem: Diabetes  Goal: Achieve decreasing blood glucose levels by end of shift  Outcome: Progressing  Goal: Increase stability of blood glucose readings by end of shift  Outcome: Progressing  Goal: Decrease in ketones present in urine by end of shift  Outcome: Progressing  Goal: Maintain electrolyte levels within acceptable range throughout shift  Outcome: Progressing  Goal: Maintain glucose levels >70mg/dl to <250mg/dl throughout shift  Outcome: Progressing  Goal: No changes in neurological exam by end of shift  Outcome: Progressing  Goal: Learn about and adhere to nutrition recommendations by end of shift  Outcome: Progressing  Goal: Vital signs within normal range for age by end of shift  Outcome:  Progressing  Goal: Increase self care and/or family involovement by end of shift  Outcome: Progressing  Goal: Receive DSME education by end of shift  Outcome: Progressing     Problem: Discharge Planning  Goal: Discharge to home or other facility with appropriate resources  Outcome: Progressing     Problem: Chronic Conditions and Co-morbidities  Goal: Patient's chronic conditions and co-morbidity symptoms are monitored and maintained or improved  Outcome: Progressing

## 2024-01-24 NOTE — CARE PLAN
Entered new labs. Awaiting provider signature.     The patient's goals for the shift include wants to sleep     The clinical goals for the shift include improved hemodynamics, decreased pressor suppoert      Problem: Pain  Goal: My pain/discomfort is manageable  Outcome: Progressing     Problem: Safety  Goal: Patient will be injury free during hospitalization  Outcome: Progressing  Goal: I will remain free of falls  Outcome: Progressing     Problem: Daily Care  Goal: Daily care needs are met  Outcome: Progressing     Problem: Psychosocial Needs  Goal: Demonstrates ability to cope with hospitalization/illness  Outcome: Progressing  Goal: Collaborate with me, my family, and caregiver to identify my specific goals  Outcome: Progressing     Problem: Discharge Barriers  Goal: My discharge needs are met  Outcome: Progressing     Problem: Fall/Injury  Goal: Not fall by end of shift  Outcome: Progressing  Goal: Be free from injury by end of the shift  Outcome: Progressing  Goal: Verbalize understanding of personal risk factors for fall in the hospital  Outcome: Progressing  Goal: Verbalize understanding of risk factor reduction measures to prevent injury from fall in the home  Outcome: Progressing  Goal: Pace activities to prevent fatigue by end of the shift  Outcome: Progressing     Problem: Skin  Goal: Decreased wound size/increased tissue granulation at next dressing change  Outcome: Progressing  Flowsheets (Taken 1/24/2024 0048)  Decreased wound size/increased tissue granulation at next dressing change:   Promote sleep for wound healing   Protective dressings over bony prominences  Goal: Participates in plan/prevention/treatment measures  Outcome: Progressing  Flowsheets (Taken 1/24/2024 0048)  Participates in plan/prevention/treatment measures:   Elevate heels   Increase activity/out of bed for meals   Discuss with provider PT/OT consult  Goal: Prevent/manage excess moisture  Outcome: Progressing  Flowsheets (Taken 1/24/2024 0048)  Prevent/manage excess moisture:    Monitor for/manage infection if present   Cleanse incontinence/protect with barrier cream   Moisturize dry skin   Follow provider orders for dressing changes  Goal: Prevent/minimize sheer/friction injuries  Outcome: Progressing  Flowsheets (Taken 1/24/2024 0048)  Prevent/minimize sheer/friction injuries:   Use pull sheet   Increase activity/out of bed for meals   Turn/reposition every 2 hours/use positioning/transfer devices   HOB 30 degrees or less  Goal: Promote/optimize nutrition  Outcome: Progressing  Flowsheets (Taken 1/24/2024 0048)  Promote/optimize nutrition:   Assist with feeding   Monitor/record intake including meals   Consume > 50% meals/supplements   Offer water/supplements/favorite foods  Goal: Promote skin healing  Outcome: Progressing  Flowsheets (Taken 1/24/2024 0048)  Promote skin healing:   Assess skin/pad under line(s)/device(s)   Protective dressings over bony prominences   Turn/reposition every 2 hours/use positioning/transfer devices     Problem: Pain  Goal: Takes deep breaths with improved pain control throughout the shift  Outcome: Progressing  Goal: Turns in bed with improved pain control throughout the shift  Outcome: Progressing  Goal: Walks with improved pain control throughout the shift  Outcome: Progressing  Goal: Performs ADL's with improved pain control throughout shift  Outcome: Progressing  Goal: Participates in PT with improved pain control throughout the shift  Outcome: Progressing  Goal: Free from opioid side effects throughout the shift  Outcome: Progressing  Goal: Free from acute confusion related to pain meds throughout the shift  Outcome: Progressing     Problem: Respiratory  Goal: Clear secretions with interventions this shift  Outcome: Progressing  Goal: Minimize anxiety/maximize coping throughout shift  Outcome: Progressing  Goal: Minimal/no exertional discomfort or dyspnea this shift  Outcome: Progressing  Goal: No signs of respiratory distress (eg. Use of accessory  muscles. Peds grunting)  Outcome: Progressing  Goal: Patent airway maintained this shift  Outcome: Progressing  Goal: Tolerate pulmonary toileting this shift  Outcome: Progressing  Goal: Verbalize decreased shortness of breath this shift  Outcome: Progressing  Goal: Wean oxygen to maintain O2 saturation per order/standard this shift  Outcome: Progressing  Goal: Increase self care and/or family involvement in next 24 hours  Outcome: Progressing     Problem: Diabetes  Goal: Achieve decreasing blood glucose levels by end of shift  Outcome: Progressing  Goal: Increase stability of blood glucose readings by end of shift  Outcome: Progressing  Goal: Decrease in ketones present in urine by end of shift  Outcome: Progressing  Goal: Maintain electrolyte levels within acceptable range throughout shift  Outcome: Progressing  Goal: Maintain glucose levels >70mg/dl to <250mg/dl throughout shift  Outcome: Progressing  Goal: No changes in neurological exam by end of shift  Outcome: Progressing  Goal: Learn about and adhere to nutrition recommendations by end of shift  Outcome: Progressing  Goal: Vital signs within normal range for age by end of shift  Outcome: Progressing  Goal: Increase self care and/or family involovement by end of shift  Outcome: Progressing  Goal: Receive DSME education by end of shift  Outcome: Progressing     Problem: Discharge Planning  Goal: Discharge to home or other facility with appropriate resources  Outcome: Progressing     Problem: Chronic Conditions and Co-morbidities  Goal: Patient's chronic conditions and co-morbidity symptoms are monitored and maintained or improved  Outcome: Progressing

## 2024-01-24 NOTE — PROGRESS NOTES
Patient not medically clear. Patient remains in the ICU. Patient on pressors. [facility] dialysis on this day. Patient's family thinking that they want patient to go to [facility] at discharge. Referral has not been sent. Patient will need therapy evaluation. Will follow.     **PATIENT DOES NOT HAVE A SAFE DISCHARGE PLAN      Shauna Jensen RN

## 2024-01-24 NOTE — NURSING NOTE
Full skin assessment completed. See images and flowsheet for measurement and description. Pressure overlay mattress topper in place. Pressure relieve boots present. Foam border changed to sacrum, left off per patients request due to burning sensation. Q2 turns throughout shift provided. All pressure areas relieved by either elevation/support or repositioning.

## 2024-01-24 NOTE — PROGRESS NOTES
Vancomycin Dosing by Pharmacy- FOLLOW-UP (HEMODIALYSIS)    Ayanna Gross is a 73 y.o. year old female who Pharmacy has been consulted for vancomycin dosing for Vancomycin Indications: Skin & Soft Tissue. Based on the patient's indication and renal status this patient will be dosed based on a pre-HD level of 20-25 mcg/mL.     Patient is currently on hemodialysis.    Received vancomycin maintenance dose: 750 mg after dialysis on 1/22.     Vancomycin pre-HD level 19.2    Visit Vitals  BP 85/65   Pulse (!) 111   Temp 36 °C (96.8 °F) (Temporal)   Resp 16           Lab Results   Component Value Date    CREATININE 3.10 (H) 01/24/2024    CREATININE 2.60 (H) 01/23/2024    CREATININE 4.20 (H) 01/22/2024    CREATININE 3.90 (H) 01/21/2024       I/O last 3 completed shifts:  In: 2093.7 (26.3 mL/kg) [P.O.:290; I.V.:1553.7 (19.5 mL/kg); IV Piggyback:250]  Out: 500 (6.3 mL/kg) [Stool:500]  Weight: 79.5 kg     Assessment/Plan     Below goal random/trough level. Vancomycin dose after dialysis session will be adjusted to 1000 mg. HD Sessions scheduled for User Schedule (TBD) - 1000mg after dialysis today  Pre-HD level will be obtained on 1/26 at 0500. May be obtained sooner if clinically indicated. If level is above goal, random level with AM labs the next morning will be obtained.   Will continue to monitor renal function daily while on vancomycin and order serum creatinine at least every 48 hours if not already ordered.  Follow for continued vancomycin needs, clinical response, and signs/symptoms of toxicity.     Kendall Alfonso, PharmD

## 2024-01-24 NOTE — NURSING NOTE
BSSR. Patient resting in bed with eyes closed. Chest rise and fall visualized. No distress noted. Pressure overlay mattress topper in place. Patient currently laying on back. Call light within reach.

## 2024-01-25 NOTE — PROGRESS NOTES
CONSULT PROGRESS NOTES    SERVICE DATE: 1/25/2024   SERVICE TIME: 4:02 PM    CONSULTING SERVICE: Nephrology    ASSESSMENT AND PLAN   1.  End-stage renal disease  2.  Hypotension  3.  Hyponatremia  4.  Hypokalemia  5.  Anemia of chronic kidney disease     Dialysis probably tomorrow via [facility] again with low temperature, and will attempt 1.5 L fluid removal.    She is extremely malnourished with low albumin, low prealbumin, no major muscle mass.  Hyponatremia from volume overload in this patient with oliguria.  For her hypokalemia, use a 3K bath.  Hemoglobin is at goal.  Can use IV albumin with hemodialysis.  Attempting to wean from pressors with a target systolic blood pressure greater than 70.  Typically, she has no major mental status changes with a blood pressure above 70.  Uremia should be mitigated with adequate hemodialysis that she has had this week.  Case discussed with the Nickolas Gallego CNP.  Will follow her this week.    Challenging case, lots of major medical problems, profound hypotension requiring pressors.    SUBJECTIVE  INTERVAL HPI: She remains on vasopressin and norepinephrine and is extremely sensitive with labile blood pressure in response to any dosage change.  Consequently, ICU nursing has been unable to wean her from either of these pressor medications.  She is seen with a breathing treatment.  She has a appropriate affect and is somewhat conversant.  She does have an appetite.  No major diarrhea.  She has a rectal tube in place.    MEDICATIONS:  acetaminophen, 650 mg, oral, TID  albumin human, 12.5 g, intravenous, Once  apixaban, 2.5 mg, oral, BID  doxycylcine, 100 mg, oral, Daily  escitalopram, 10 mg, oral, Nightly  gabapentin, 100 mg, oral, BID  heparin, 2,000 Units, intra-catheter, After Dialysis  heparin, 2,000 Units, intra-catheter, After Dialysis  heparin, 2,000 Units, intra-catheter, After Dialysis  heparin, 2,000 Units, intra-catheter, After Dialysis  insulin lispro, 0-10 Units,  subcutaneous, TID with meals  ipratropium-albuteroL, 3 mL, nebulization, q8h  midodrine, 15 mg, oral, TID with meals  nystatin, , Topical, BID  nystatin, , Topical, BID  pantoprazole, 40 mg, oral, Daily   Or  pantoprazole, 40 mg, intravenous, Daily  sodium chloride, 3 mL, nebulization, TID  zinc oxide, 1 Application, Topical, BID       norepinephrine, 0.01-3 mcg/kg/min, Last Rate: 0.04 mcg/kg/min (01/25/24 1334)  vasopressin, 0.03 Units/min, Last Rate: 0.03 Units/min (01/25/24 1435)       PRN medications: dextrose 10 % in water (D10W), dextrose, glucagon, oxygen, sennosides-docusate sodium     OBJECTIVE  PHYSICAL EXAM:   Heart Rate:  [77-99]   Temp:  [35.3 °C (95.5 °F)-37 °C (98.6 °F)]   Resp:  [11-33]   SpO2:  [89 %-100 %]   Body mass index is 32.06 kg/m².  Chronically ill-appearing elderly white woman  Pale skin  Right-sided hand swelling  Left-sided finger amputation  Internal jugular tunneled hemodialysis catheter in place  She has some pretibial edema on both lower extremities  Soft abdomen  No Amos  No obvious joint deformities  Moist mucosa  Hearing seems to be intact  Phonation intact  Arterial line placed  Right internal jugular triple-lumen catheter in place  Rectal tube    DATA:   Labs:  Results for orders placed or performed during the hospital encounter of 01/19/24 (from the past 96 hour(s))   POCT GLUCOSE   Result Value Ref Range    POCT Glucose 181 (H) 74 - 99 mg/dL   C. difficile, PCR    Specimen: Stool   Result Value Ref Range    C. difficile, PCR Not Detected Not Detected   CBC and Auto Differential   Result Value Ref Range    WBC 21.2 (H) 4.4 - 11.3 x10*3/uL    nRBC 0.2 (H) 0.0 - 0.0 /100 WBCs    RBC 4.11 4.00 - 5.20 x10*6/uL    Hemoglobin 12.0 12.0 - 16.0 g/dL    Hematocrit 37.5 36.0 - 46.0 %    MCV 91 80 - 100 fL    MCH 29.2 26.0 - 34.0 pg    MCHC 32.0 32.0 - 36.0 g/dL    RDW 24.4 (H) 11.5 - 14.5 %    Platelets 355 150 - 450 x10*3/uL    Neutrophils % 74.6 40.0 - 80.0 %    Immature Granulocytes  %, Automated 1.1 (H) 0.0 - 0.9 %    Lymphocytes % 14.6 13.0 - 44.0 %    Monocytes % 6.1 2.0 - 10.0 %    Eosinophils % 3.2 0.0 - 6.0 %    Basophils % 0.4 0.0 - 2.0 %    Neutrophils Absolute 15.81 (H) 1.60 - 5.50 x10*3/uL    Immature Granulocytes Absolute, Automated 0.23 0.00 - 0.50 x10*3/uL    Lymphocytes Absolute 3.09 (H) 0.80 - 3.00 x10*3/uL    Monocytes Absolute 1.29 (H) 0.05 - 0.80 x10*3/uL    Eosinophils Absolute 0.68 (H) 0.00 - 0.40 x10*3/uL    Basophils Absolute 0.09 0.00 - 0.10 x10*3/uL   Comprehensive metabolic panel   Result Value Ref Range    Glucose 159 (H) 65 - 99 mg/dL    Sodium 127 (L) 133 - 145 mmol/L    Potassium 3.6 3.4 - 5.1 mmol/L    Chloride 92 (L) 97 - 107 mmol/L    Bicarbonate 21 (L) 24 - 31 mmol/L    Urea Nitrogen 27 (H) 8 - 25 mg/dL    Creatinine 4.20 (H) 0.40 - 1.60 mg/dL    eGFR 11 (L) >60 mL/min/1.73m*2    Calcium 8.6 8.5 - 10.4 mg/dL    Albumin 1.9 (L) 3.5 - 5.0 g/dL    Alkaline Phosphatase 106 35 - 125 U/L    Total Protein 5.2 (L) 5.9 - 7.9 g/dL    AST 17 5 - 40 U/L    Bilirubin, Total 0.9 0.1 - 1.2 mg/dL    ALT 10 5 - 40 U/L    Anion Gap 14 <=19 mmol/L   Magnesium   Result Value Ref Range    Magnesium 1.90 1.60 - 3.10 mg/dL   Phosphorus   Result Value Ref Range    Phosphorus 4.0 2.5 - 4.5 mg/dL   Morphology   Result Value Ref Range    RBC Morphology See Below     Target Cells Few     Ovalocytes Few     Bhavesh Cells Few    POCT GLUCOSE   Result Value Ref Range    POCT Glucose 109 (H) 74 - 99 mg/dL   MRSA Surveillance for Vancomycin De-escalation, PCR    Specimen: Anterior Nares; Swab   Result Value Ref Range    MRSA PCR Not Detected Not Detected   POCT GLUCOSE   Result Value Ref Range    POCT Glucose 121 (H) 74 - 99 mg/dL   Sedimentation rate, automated   Result Value Ref Range    Sedimentation Rate 49 (H) 0 - 30 mm/h   POCT GLUCOSE   Result Value Ref Range    POCT Glucose 128 (H) 74 - 99 mg/dL   Transthoracic Echo (TTE) Complete   Result Value Ref Range    AV pk david 0.84 m/s    LVOT diam  1.90 cm    MV E/A ratio 1.32     LVIDd 3.66 cm    AV pk grad 2.8 mmHg    Aortic Valve Area by Continuity of Peak Velocity 1.57 cm2    LV A4C EF 68.3    POCT GLUCOSE   Result Value Ref Range    POCT Glucose 145 (H) 74 - 99 mg/dL   CBC and Auto Differential   Result Value Ref Range    WBC 16.5 (H) 4.4 - 11.3 x10*3/uL    nRBC 0.2 (H) 0.0 - 0.0 /100 WBCs    RBC 3.62 (L) 4.00 - 5.20 x10*6/uL    Hemoglobin 10.7 (L) 12.0 - 16.0 g/dL    Hematocrit 32.4 (L) 36.0 - 46.0 %    MCV 90 80 - 100 fL    MCH 29.6 26.0 - 34.0 pg    MCHC 33.0 32.0 - 36.0 g/dL    RDW 24.0 (H) 11.5 - 14.5 %    Platelets 233 150 - 450 x10*3/uL    Neutrophils % 74.9 40.0 - 80.0 %    Immature Granulocytes %, Automated 0.7 0.0 - 0.9 %    Lymphocytes % 14.4 13.0 - 44.0 %    Monocytes % 6.6 2.0 - 10.0 %    Eosinophils % 3.0 0.0 - 6.0 %    Basophils % 0.4 0.0 - 2.0 %    Neutrophils Absolute 12.32 (H) 1.60 - 5.50 x10*3/uL    Immature Granulocytes Absolute, Automated 0.12 0.00 - 0.50 x10*3/uL    Lymphocytes Absolute 2.37 0.80 - 3.00 x10*3/uL    Monocytes Absolute 1.08 (H) 0.05 - 0.80 x10*3/uL    Eosinophils Absolute 0.50 (H) 0.00 - 0.40 x10*3/uL    Basophils Absolute 0.07 0.00 - 0.10 x10*3/uL   Comprehensive metabolic panel   Result Value Ref Range    Glucose 124 (H) 65 - 99 mg/dL    Sodium 128 (L) 133 - 145 mmol/L    Potassium 3.9 3.4 - 5.1 mmol/L    Chloride 93 (L) 97 - 107 mmol/L    Bicarbonate 22 (L) 24 - 31 mmol/L    Urea Nitrogen 15 8 - 25 mg/dL    Creatinine 2.60 (H) 0.40 - 1.60 mg/dL    eGFR 19 (L) >60 mL/min/1.73m*2    Calcium 8.0 (L) 8.5 - 10.4 mg/dL    Albumin 2.6 (L) 3.5 - 5.0 g/dL    Alkaline Phosphatase 119 35 - 125 U/L    Total Protein 5.1 (L) 5.9 - 7.9 g/dL    AST 16 5 - 40 U/L    Bilirubin, Total 1.6 (H) 0.1 - 1.2 mg/dL    ALT 10 5 - 40 U/L    Anion Gap 13 <=19 mmol/L   Magnesium   Result Value Ref Range    Magnesium 1.70 1.60 - 3.10 mg/dL   Phosphorus   Result Value Ref Range    Phosphorus 2.3 (L) 2.5 - 4.5 mg/dL   Morphology   Result Value Ref  Range    RBC Morphology See Below     Target Cells Few     Ovalocytes Few     Perry Cells Few    POCT GLUCOSE   Result Value Ref Range    POCT Glucose 95 74 - 99 mg/dL   POCT GLUCOSE   Result Value Ref Range    POCT Glucose 146 (H) 74 - 99 mg/dL   POCT GLUCOSE   Result Value Ref Range    POCT Glucose 73 (L) 74 - 99 mg/dL   CBC and Auto Differential   Result Value Ref Range    WBC 17.8 (H) 4.4 - 11.3 x10*3/uL    nRBC 0.2 (H) 0.0 - 0.0 /100 WBCs    RBC 3.68 (L) 4.00 - 5.20 x10*6/uL    Hemoglobin 10.7 (L) 12.0 - 16.0 g/dL    Hematocrit 32.5 (L) 36.0 - 46.0 %    MCV 88 80 - 100 fL    MCH 29.1 26.0 - 34.0 pg    MCHC 32.9 32.0 - 36.0 g/dL    RDW 23.9 (H) 11.5 - 14.5 %    Platelets 226 150 - 450 x10*3/uL    Neutrophils % 69.5 40.0 - 80.0 %    Immature Granulocytes %, Automated 1.1 (H) 0.0 - 0.9 %    Lymphocytes % 19.5 13.0 - 44.0 %    Monocytes % 7.2 2.0 - 10.0 %    Eosinophils % 2.1 0.0 - 6.0 %    Basophils % 0.6 0.0 - 2.0 %    Neutrophils Absolute 12.37 (H) 1.60 - 5.50 x10*3/uL    Immature Granulocytes Absolute, Automated 0.19 0.00 - 0.50 x10*3/uL    Lymphocytes Absolute 3.47 (H) 0.80 - 3.00 x10*3/uL    Monocytes Absolute 1.28 (H) 0.05 - 0.80 x10*3/uL    Eosinophils Absolute 0.38 0.00 - 0.40 x10*3/uL    Basophils Absolute 0.10 0.00 - 0.10 x10*3/uL   Comprehensive metabolic panel   Result Value Ref Range    Glucose 155 (H) 65 - 99 mg/dL    Sodium 129 (L) 133 - 145 mmol/L    Potassium 3.8 3.4 - 5.1 mmol/L    Chloride 93 (L) 97 - 107 mmol/L    Bicarbonate 18 (L) 24 - 31 mmol/L    Urea Nitrogen 17 8 - 25 mg/dL    Creatinine 3.10 (H) 0.40 - 1.60 mg/dL    eGFR 16 (L) >60 mL/min/1.73m*2    Calcium 8.0 (L) 8.5 - 10.4 mg/dL    Albumin 2.2 (L) 3.5 - 5.0 g/dL    Alkaline Phosphatase 89 35 - 125 U/L    Total Protein 4.6 (L) 5.9 - 7.9 g/dL    AST 24 5 - 40 U/L    Bilirubin, Total 1.7 (H) 0.1 - 1.2 mg/dL    ALT 9 5 - 40 U/L    Anion Gap 18 <=19 mmol/L   Magnesium   Result Value Ref Range    Magnesium 1.60 1.60 - 3.10 mg/dL   Phosphorus    Result Value Ref Range    Phosphorus 2.7 2.5 - 4.5 mg/dL   Vancomycin   Result Value Ref Range    Vancomycin 19.2 10.0 - 20.0 ug/mL   Morphology   Result Value Ref Range    RBC Morphology See Below     Polychromasia Mild     Target Cells Few     Lyons Cells Many     Pappenheimer Bodies Present    TSH   Result Value Ref Range    Thyroid Stimulating Hormone 6.10 (H) 0.27 - 4.20 mIU/L   POCT GLUCOSE   Result Value Ref Range    POCT Glucose 144 (H) 74 - 99 mg/dL   POCT GLUCOSE   Result Value Ref Range    POCT Glucose 160 (H) 74 - 99 mg/dL   POCT GLUCOSE   Result Value Ref Range    POCT Glucose 119 (H) 74 - 99 mg/dL   POCT GLUCOSE   Result Value Ref Range    POCT Glucose 156 (H) 74 - 99 mg/dL   Comprehensive metabolic panel   Result Value Ref Range    Glucose 133 (H) 65 - 99 mg/dL    Sodium 134 133 - 145 mmol/L    Potassium 2.6 (LL) 3.4 - 5.1 mmol/L    Chloride 104 97 - 107 mmol/L    Bicarbonate 18 (L) 24 - 31 mmol/L    Urea Nitrogen 7 (L) 8 - 25 mg/dL    Creatinine 1.50 0.40 - 1.60 mg/dL    eGFR 37 (L) >60 mL/min/1.73m*2    Calcium 6.3 (L) 8.5 - 10.4 mg/dL    Albumin 2.2 (L) 3.5 - 5.0 g/dL    Alkaline Phosphatase 65 35 - 125 U/L    Total Protein 3.6 (L) 5.9 - 7.9 g/dL    AST 15 5 - 40 U/L    Bilirubin, Total 1.5 (H) 0.1 - 1.2 mg/dL    ALT 5 5 - 40 U/L    Anion Gap 12 <=19 mmol/L   Magnesium   Result Value Ref Range    Magnesium 1.30 (L) 1.60 - 3.10 mg/dL   Phosphorus   Result Value Ref Range    Phosphorus 1.6 (L) 2.5 - 4.5 mg/dL   CBC   Result Value Ref Range    WBC 9.2 4.4 - 11.3 x10*3/uL    nRBC 0.0 0.0 - 0.0 /100 WBCs    RBC 2.27 (L) 4.00 - 5.20 x10*6/uL    Hemoglobin 6.7 (L) 12.0 - 16.0 g/dL    Hematocrit 21.1 (L) 36.0 - 46.0 %    MCV 93 80 - 100 fL    MCH 29.5 26.0 - 34.0 pg    MCHC 31.8 (L) 32.0 - 36.0 g/dL    RDW 24.4 (H) 11.5 - 14.5 %    Platelets 96 (L) 150 - 450 x10*3/uL   Bilirubin, Direct   Result Value Ref Range    Bilirubin, Direct 1.1 (H) 0.0 - 0.2 mg/dL   CBC   Result Value Ref Range    WBC 13.7 (H) 4.4  - 11.3 x10*3/uL    nRBC 0.2 (H) 0.0 - 0.0 /100 WBCs    RBC 3.20 (L) 4.00 - 5.20 x10*6/uL    Hemoglobin 9.6 (L) 12.0 - 16.0 g/dL    Hematocrit 28.9 (L) 36.0 - 46.0 %    MCV 90 80 - 100 fL    MCH 30.0 26.0 - 34.0 pg    MCHC 33.2 32.0 - 36.0 g/dL    RDW 23.9 (H) 11.5 - 14.5 %    Platelets 150 150 - 450 x10*3/uL   Basic metabolic panel   Result Value Ref Range    Glucose 164 (H) 65 - 99 mg/dL    Sodium 129 (L) 133 - 145 mmol/L    Potassium 3.6 3.4 - 5.1 mmol/L    Chloride 92 (L) 97 - 107 mmol/L    Bicarbonate 24 24 - 31 mmol/L    Urea Nitrogen 9 8 - 25 mg/dL    Creatinine 2.00 (H) 0.40 - 1.60 mg/dL    eGFR 26 (L) >60 mL/min/1.73m*2    Calcium 8.5 8.5 - 10.4 mg/dL    Anion Gap 13 <=19 mmol/L   Phosphorus   Result Value Ref Range    Phosphorus 2.1 (L) 2.5 - 4.5 mg/dL   Magnesium   Result Value Ref Range    Magnesium 1.70 1.60 - 3.10 mg/dL   Hepatic function panel   Result Value Ref Range    AST 23 5 - 40 U/L    ALT 10 5 - 40 U/L    Alkaline Phosphatase 92 35 - 125 U/L    Bilirubin, Total 1.9 (H) 0.1 - 1.2 mg/dL    Bilirubin, Direct 1.4 (H) 0.0 - 0.2 mg/dL    Total Protein 4.9 (L) 5.9 - 7.9 g/dL    Albumin 2.9 (L) 3.5 - 5.0 g/dL   POCT GLUCOSE   Result Value Ref Range    POCT Glucose 167 (H) 74 - 99 mg/dL   POCT GLUCOSE   Result Value Ref Range    POCT Glucose 146 (H) 74 - 99 mg/dL         SIGNATURE: Saeed Mondragon MD PATIENT NAME: Ayanna Gross   DATE: January 25, 2024 MRN: [id number]   TIME: 4:02 PM PAGER: [phone]

## 2024-01-25 NOTE — CARE PLAN
Problem: Safety  Goal: Patient will be injury free during hospitalization  Outcome: Progressing  Goal: I will remain free of falls  Outcome: Progressing     Problem: Daily Care  Goal: Daily care needs are met  Outcome: Progressing     Problem: Discharge Barriers  Goal: My discharge needs are met  Outcome: Progressing     Problem: Fall/Injury  Goal: Not fall by end of shift  Outcome: Progressing  Goal: Be free from injury by end of the shift  Outcome: Progressing  Goal: Verbalize understanding of personal risk factors for fall in the hospital  Outcome: Progressing  Goal: Verbalize understanding of risk factor reduction measures to prevent injury from fall in the home  Outcome: Progressing  Goal: Pace activities to prevent fatigue by end of the shift  Outcome: Progressing     Problem: Skin  Goal: Decreased wound size/increased tissue granulation at next dressing change  Outcome: Progressing  Goal: Participates in plan/prevention/treatment measures  Outcome: Progressing  Goal: Prevent/manage excess moisture  Outcome: Progressing  Goal: Prevent/minimize sheer/friction injuries  Outcome: Progressing  Goal: Promote/optimize nutrition  Outcome: Progressing  Goal: Promote skin healing  Outcome: Progressing     Problem: Pain  Goal: Takes deep breaths with improved pain control throughout the shift  Outcome: Progressing  Goal: Turns in bed with improved pain control throughout the shift  Outcome: Progressing  Goal: Walks with improved pain control throughout the shift  Outcome: Progressing  Goal: Performs ADL's with improved pain control throughout shift  Outcome: Progressing  Goal: Participates in PT with improved pain control throughout the shift  Outcome: Progressing  Goal: Free from opioid side effects throughout the shift  Outcome: Progressing  Goal: Free from acute confusion related to pain meds throughout the shift  Outcome: Progressing     Problem: Respiratory  Goal: Clear secretions with interventions this  shift  Outcome: Progressing  Goal: Minimize anxiety/maximize coping throughout shift  Outcome: Progressing  Goal: Minimal/no exertional discomfort or dyspnea this shift  Outcome: Progressing  Goal: No signs of respiratory distress (eg. Use of accessory muscles. Peds grunting)  Outcome: Progressing  Goal: Patent airway maintained this shift  Outcome: Progressing  Goal: Tolerate pulmonary toileting this shift  Outcome: Progressing  Goal: Verbalize decreased shortness of breath this shift  Outcome: Progressing  Goal: Wean oxygen to maintain O2 saturation per order/standard this shift  Outcome: Progressing  Goal: Increase self care and/or family involvement in next 24 hours  Outcome: Progressing     Problem: Diabetes  Goal: Achieve decreasing blood glucose levels by end of shift  Outcome: Progressing  Goal: Increase stability of blood glucose readings by end of shift  Outcome: Progressing  Goal: Decrease in ketones present in urine by end of shift  Outcome: Progressing  Goal: Maintain electrolyte levels within acceptable range throughout shift  Outcome: Progressing  Goal: Maintain glucose levels >70mg/dl to <250mg/dl throughout shift  Outcome: Progressing  Goal: No changes in neurological exam by end of shift  Outcome: Progressing  Goal: Learn about and adhere to nutrition recommendations by end of shift  Outcome: Progressing  Goal: Vital signs within normal range for age by end of shift  Outcome: Progressing  Goal: Increase self care and/or family involovement by end of shift  Outcome: Progressing  Goal: Receive DSME education by end of shift  Outcome: Progressing     Problem: Discharge Planning  Goal: Discharge to home or other facility with appropriate resources  Outcome: Progressing     Problem: Chronic Conditions and Co-morbidities  Goal: Patient's chronic conditions and co-morbidity symptoms are monitored and maintained or improved  Outcome: Progressing     Problem: Pain  Goal: My pain/discomfort is  manageable  Outcome: Progressing           The patient's goals for the shift include unable to state    The clinical goals for the shift include wean off pressors as tolerated

## 2024-01-25 NOTE — PROGRESS NOTES
Ayanna Gross is a 73 y.o. female on day 6 of admission presenting with Septic shock (CMS/HCC).    Subjective   Interval History:   Afebrile, no chills  On vasopressin, and Levophed  Awake, alert  No chest pain or shortness of breath  Daughter present    Review of Systems   All other systems reviewed and are negative.      Objective   Range of Vitals (last 24 hours)  Heart Rate:  [0-144]   Temp:  [35.7 °C (96.3 °F)-37.1 °C (98.8 °F)]   Resp:  [11-33]   SpO2:  [85 %-100 %]   Daily Weight  01/24/24 : 79.5 kg (175 lb 4.3 oz)    Body mass index is 32.06 kg/m².    Physical Exam  Constitutional:       Appearance: Normal appearance.   HENT:      Head: Normocephalic and atraumatic.      Nose: Nose normal.   Eyes:      Extraocular Movements: Extraocular movements intact.      Conjunctiva/sclera: Conjunctivae normal.   Cardiovascular:      Heart sounds: Normal heart sounds, S1 normal and S2 normal.   Pulmonary:      Breath sounds: Decreased breath sounds present.   Abdominal:      General: Bowel sounds are normal.      Palpations: Abdomen is soft.   Musculoskeletal:      Cervical back: Normal range of motion and neck supple.   Skin:     Comments: Stage III sacral ulcer, bilateral posterior heel eschar -photos examined  Neurological:      Mental Status: She is alert.     Antibiotics  aspirin tablet 325 mg  acetaminophen (Tylenol) tablet 650 mg  cefepime (Maxipime) 1 g in dextrose 5 % 50 mL IV  sodium chloride 0.9 % bolus 2,229 mL  apixaban (Eliquis) tablet 2.5 mg  escitalopram (Lexapro) tablet 10 mg  febuxostat (Uloric) tablet 40 mg  fenofibrate (Triglide) tablet 160 mg  gabapentin (Neurontin) capsule 100 mg  midodrine (Proamatine) tablet 15 mg  nystatin (Mycostatin) 100,000 unit/gram powder  oxyCODONE-acetaminophen (Percocet) 5-325 mg per tablet 1 tablet  simvastatin (Zocor) tablet 20 mg  piperacillin-tazobactam-dextrose (Zosyn) IV 2.25 g  vancomycin (Vancocin) capsule 125 mg  oxygen (O2) therapy  dextrose 50 % injection 25  g  glucagon (Glucagen) injection 1 mg  dextrose 10 % in water (D10W) infusion  insulin lispro (HumaLOG) injection 0-10 Units  nystatin (Mycostatin) 100,000 unit/gram powder 1 Application  vancomycin-diluent combo no.1 (Xellia) IVPB 1,750 mg  piperacillin-tazobactam-dextrose (Zosyn) IV 2.25 g  sodium chloride 0.9 % bolus 500 mL  norepinephrine (Levophed) 8 mg in dextrose 5% 250 mL (0.032 mg/mL) infusion (premix)  vancomycin (Vancocin) placeholder  pantoprazole (ProtoNix) EC tablet 40 mg  pantoprazole (ProtoNix) injection 40 mg  sennosides-docusate sodium (Judy-Colace) 8.6-50 mg per tablet 1 tablet  doxycycline (Vibramycin) in dextrose 5 % in water (D5W) 100 mL  mg  LORazepam (Ativan) injection 2 mg  magnesium sulfate IV 2 g  zinc oxide 20 % ointment 1 Application  nystatin (Mycostatin) cream  norepinephrine (Levophed) 8 mg in dextrose 5% 250 mL (0.032 mg/mL) infusion (premix)  heparin 1,000 unit/mL injection 2,000 Units  heparin 1,000 unit/mL injection 2,000 Units  nystatin (Mycostatin) ointment  acetaminophen (Tylenol) oral liquid 1,000 mg  albumin human 25 % solution 12.5 g  perflutren lipid microspheres (Definity) injection 0.5-10 mL of dilution  sulfur hexafluoride microsphr (Lumason) injection 24.28 mg  perflutren protein A microsphere (Optison) injection 0.5 mL  ipratropium-albuteroL (Duo-Neb) 0.5-2.5 mg/3 mL nebulizer solution 3 mL  sodium chloride 3 % nebulizer solution 3 mL  vancomycin-diluent combo no.1 (Xellia) IVPB 750 mg  sennosides-docusate sodium (Judy-Colace) 8.6-50 mg per tablet 1 tablet  acetaminophen (Tylenol) tablet 650 mg  doxycycline (Vibramycin) capsule 100 mg  magnesium oxide (Mag-Ox) tablet 400 mg  vasopressin (Vasostrict) 0.2 unit/mL infusion  norepinephrine (Levophed) 8 mg in dextrose 5% 250 mL (0.032 mg/mL) infusion (premix)  heparin 1,000 unit/mL injection 2,000 Units  heparin 1,000 unit/mL injection 2,000 Units  albumin human 25 % solution 12.5 g  vancomycin (Xellia) 1 g in 200 mL  (Xellia) IVPB 1 g      Relevant Results  Labs  Results from last 72 hours   Lab Units 01/25/24  0340 01/25/24  0230 01/24/24  0500 01/23/24  0419   WBC AUTO x10*3/uL 13.7* 9.2 17.8* 16.5*   HEMOGLOBIN g/dL 9.6* 6.7* 10.7* 10.7*   HEMATOCRIT % 28.9* 21.1* 32.5* 32.4*   PLATELETS AUTO x10*3/uL 150 96* 226 233   NEUTROS PCT AUTO %  --   --  69.5 74.9   LYMPHS PCT AUTO %  --   --  19.5 14.4   MONOS PCT AUTO %  --   --  7.2 6.6   EOS PCT AUTO %  --   --  2.1 3.0     Results from last 72 hours   Lab Units 01/25/24 0340 01/25/24  0230 01/24/24  0500   SODIUM mmol/L 129* 134 129*   POTASSIUM mmol/L 3.6 2.6* 3.8   CHLORIDE mmol/L 92* 104 93*   CO2 mmol/L 24 18* 18*   BUN mg/dL 9 7* 17   CREATININE mg/dL 2.00* 1.50 3.10*   GLUCOSE mg/dL 164* 133* 155*   CALCIUM mg/dL 8.5 6.3* 8.0*   ANION GAP mmol/L 13 12 18   EGFR mL/min/1.73m*2 26* 37* 16*   PHOSPHORUS mg/dL 2.1* 1.6* 2.7     Results from last 72 hours   Lab Units 01/25/24 0340 01/25/24  0230 01/24/24  0500   ALK PHOS U/L 92 65 89   BILIRUBIN TOTAL mg/dL 1.9* 1.5* 1.7*   BILIRUBIN DIRECT mg/dL 1.4* 1.1*  --    PROTEIN TOTAL g/dL 4.9* 3.6* 4.6*   ALT U/L 10 5 9   AST U/L 23 15 24   ALBUMIN g/dL 2.9* 2.2* 2.2*     Estimated Creatinine Clearance: 25.2 mL/min (A) (by C-G formula based on SCr of 2 mg/dL (H)).  C-Reactive Protein   Date Value Ref Range Status   12/25/2023 5.20 (H) 0.00 - 2.00 mg/dL Final     CRP   Date Value Ref Range Status   06/23/2023 19.9 (H) 0 - 2.0 MG/DL Final     Comment:     Performed at [address] [address]   05/22/2022 1.0 0 - 2.0 MG/DL Final     Comment:     Performed at [address] [address]     Microbiology  Susceptibility data from last 14 days.  Collected Specimen Info Organism Ampicillin Cefazolin Cefazolin (uncomplicated UTIs only) Ciprofloxacin Gentamicin Piperacillin/Tazobactam Tetracycline Trimethoprim/Sulfamethoxazole   01/19/24 Urine from Indwelling (Amos) Catheter Proteus mirabilis S S S  S S S R S     Imaging  Upper extremity venous duplex bilateral    Result Date: 1/25/2024           [facility] [address] [id number]            Phone [phone]  Vascular Lab Report  [facility] US UPPER EXTREMITY VENOUS DUPLEX BILATERAL Patient Name:      BASIA VEGA      Isaac Physician:  [id number] Mini Busby MD, RPVI Study Date:        1/25/2024           Ordering Provider:  [id number] ERI BERMAN MRN/PID:           [id number]            Fellow: Accession#:        [id number]        Technologist:       Dara Parr RVT Date of Birth/Age: 1950 / 73 years Technologist 2: Gender:            F                   Encounter#:         [id number] Admission Status:  Inpatient           Location Performed: [facility]  Diagnosis/ICD: Right arm swelling-M79.89; Left arm swelling-M79.89 CPT Codes:     53352 Peripheral venous duplex scan for DVT complete  CONCLUSIONS:  Right Upper Venous: No evidence of acute deep vein thrombus visualized in the right upper extremity. Internal jugular vein was visualized in segments due to IV lines and bandages. Left Upper Venous: No evidence of acute deep vein thrombus visualized in the left upper extremity. Subclavian stent is noted and appears patent. There is a known occluded dialysis access noted.  Additional Findings: Technically difficult exam due to IV lines, bandages, and patient's positioning.  Imaging & Doppler Findings:  Right               Compressible Thrombus        Flow Internal Jugular        Yes        None   Spontaneous/Phasic Subclavian              Yes        None Subclavian Proximal     Yes        None   Spontaneous/Phasic Subclavian Mid          Yes        None Subclavian Distal       Yes        None   Spontaneous/Phasic Axillary                Yes        None       Pulsatile Brachial                Yes        None Cephalic                Yes        None Basilic                  Yes        None  Left                Compress Thrombus        Flow Internal Jugular      Yes      None       Pulsatile Subclavian            Yes      None Subclavian Proximal   Yes      None       Pulsatile Subclavian Mid        Yes      None       Pulsatile Subclavian Distal     Yes      None       Pulsatile Axillary              Yes      None   Spontaneous/Phasic Brachial              Yes      None Cephalic              Yes      None Basilic               Yes      None  [id number] Mini Busby MD, ALICE Electronically signed by [id number] ALICE Kelly MD on 1/25/2024 at 4:06:13 PM  ** Final **     ECG 12 lead    Result Date: 1/24/2024  Sinus tachycardia  with 1st degree AV block Right bundle branch block Possible Lateral infarct , age undetermined Abnormal ECG Confirmed by Yesika Nj ([id number]) on 1/24/2024 6:54:45 AM    XR tibia fibula right 2 views    Result Date: 1/22/2024  Interpreted By:  Real Mendieta, STUDY: XR TIBIA FIBULA RIGHT 2 VIEWS; 1/22/2024 2:15 pm   INDICATION: Signs/Symptoms:evaluate source of infection, osteomyelitis;   COMPARISON: None available.   ACCESSION NUMBER(S): [id number]   ORDERING CLINICIAN: BAN GIFFORD   TECHNIQUE: Views: AP and Lateral of the right tibia and fibula   FINDINGS: RESULT: There is no evidence for fracture or dislocation. Tricompartmental degenerative changes of the right knee. The tibia and fibula appear intact without bony erosion or evidence for osteomyelitis. No other bony or soft tissue abnormality is identified. No subcutaneous gas is noted.       No evidence for acute osseous abnormality. No bony erosion to suggest osteomyelitis.   Signed by: Real Mendieta 1/22/2024 3:15 PM Dictation workstation:   [id number]    XR ankle right 3+ views    Result Date: 1/22/2024  Interpreted By:  Real Mendieta, STUDY: XR ANKLE RIGHT 3+ VIEWS; 1/22/2024 2:15 pm   INDICATION: Signs/Symptoms:Evuluate source of infection, osteomyelitis;   COMPARISON: None  available.   ACCESSION NUMBER(S): [id number]   ORDERING CLINICIAN: BAN GIFFORD   TECHNIQUE: Views: AP, Lateral, Oblique, right ankle   FINDINGS: RESULT: There is no evidence for fracture or dislocation. The ankle mortise is intact. Joint spaces appear adequately maintained. No bony erosion to suggest osteomyelitis. No bone lesion or soft tissue abnormality is identified. No subcutaneous gas is seen.       No evidence for acute osseous abnormality. No bony erosion to suggest osteomyelitis.   Signed by: Real Mendieta 1/22/2024 3:14 PM Dictation workstation:   [id number]    Transthoracic Echo (TTE) Complete    Result Date: 1/22/2024           [place] [address]            Phone [phone] TRANSTHORACIC ECHOCARDIOGRAM REPORT  Patient Name:      BASIA Gray Physician:   [id number] Fausto Rowe DO Study Date:        1/22/2024           Ordering Provider:   [id number] ERI BERMAN MRN/PID:           [id number]            Fellow: Accession#:        [id number]        Nurse: Date of Birth/Age: 1950 / 73 years Sonographer:         Tabatha Page RDCS Gender:            F                   Additional Staff: Height:            157.00 cm           Admit Date: Weight:            75.00 kg            Admission Status:    Inpatient - Routine BSA:               1.76 m2             Department Location: [facility] Blood Pressure: 88 /49 mmHg Study Type:    TRANSTHORACIC ECHO (TTE) COMPLETE Diagnosis/ICD: Chronic combined systolic (congestive) and diastolic (congestive)                heart failure (CHF)-I50.42; Sepsis, unspecified organism-A41.9 Indication:    heart failure,septic shock CPT Codes:     Echo Complete w Full Doppler-95405 Patient History: BMI:               Obese >30 Pertinent History: Sepsis, PVD, A-Fib, CVA, Cancer and HTN. breast                    prosthesis/ca, ESRD,anemia,septic shock,heart  failure. Study Detail: The following Echo studies were performed: 2D, M-Mode, Doppler and               color flow. Technically challenging study due to prosthesis,               prominent lung artifact and body habitus.  PHYSICIAN INTERPRETATION: Left Ventricle: Left ventricular systolic function is normal, with an estimated ejection fraction of 70%. There are no regional wall motion abnormalities. The left ventricular cavity size is normal. Left ventricular diastolic filling was indeterminate. Left Atrium: The left atrium is moderately dilated. Right Ventricle: The right ventricle is normal in size. There is normal right ventricular global systolic function. Right Atrium: The right atrium is normal in size. Aortic Valve: The aortic valve is trileaflet. There is no evidence of aortic valve regurgitation. The peak instantaneous gradient of the aortic valve is 2.8 mmHg. Mitral Valve: The mitral valve is normal in structure. There is no evidence of mitral valve regurgitation. Tricuspid Valve: The tricuspid valve is structurally normal. There is trace tricuspid regurgitation. Pulmonic Valve: The pulmonic valve is not well visualized. The pulmonic valve regurgitation was not well visualized. Pericardium: There is no pericardial effusion noted. Aorta: The aortic root is normal.  CONCLUSIONS:  1. Left ventricular systolic function is normal with a 70% estimated ejection fraction.  2. The left atrium is moderately dilated.  3. Left ventricular diastolic filling indeterminate. QUANTITATIVE DATA SUMMARY: 2D MEASUREMENTS:                          Normal Ranges: LAs:           4.50 cm   (2.7-4.0cm) IVSd:          0.61 cm   (0.6-1.1cm) LVPWd:         0.88 cm   (0.6-1.1cm) LVIDd:         3.66 cm   (3.9-5.9cm) LV Mass Index: 41.9 g/m2 LV SYSTOLIC FUNCTION BY 2D PLANIMETRY (MOD):                     Normal Ranges: EF-A4C View: 68.3 % (>=55%) LV DIASTOLIC FUNCTION:                     Normal Ranges: MV Peak E: 1.19 m/s (0.7-1.2  m/s) MV Peak A: 0.90 m/s (0.42-0.7 m/s) E/A Ratio: 1.32     (1.0-2.2) MITRAL VALVE:                 Normal Ranges: MV DT: 192 msec (150-240msec) AORTIC VALVE:                         Normal Ranges: AoV Vmax:      0.84 m/s (<=1.7m/s) AoV Peak P.8 mmHg (<20mmHg) LVOT Max Shimon:  0.47 m/s (<=1.1m/s) LVOT Diameter: 1.90 cm  (1.8-2.4cm) AoV Area,Vmax: 1.57 cm2 (2.5-4.5cm2) TRICUSPID VALVE/RVSP:                   Normal Ranges: IVC Diam: 1.05 cm  [id number] Fausto Rowe DO Electronically signed on 2024 at 2:53:04 PM  ** Final **     XR foot left 3+ views    Result Date: 2024  Interpreted By:  Real Mendieta, STUDY: XR FOOT LEFT 1-2 VIEWS; 2024 4:15 pm   INDICATION: Signs/Symptoms:osteomyelitis. Pain   COMPARISON: 2023   ACCESSION NUMBER(S): [id number]   ORDERING CLINICIAN: BAN GIFFORD   TECHNIQUE: Views:  AP, Lat, Oblique, left foot   FINDINGS: RESULT: There is no evidence for fracture or dislocation. 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 Mild hammertoe deformities. Joint spaces appear adequately maintained. Soft tissue ulceration of the posterior aspect of the heel however no evidence for bony erosion to suggest osteomyelitis.       No evidence for acute fracture or acute bony erosion to suggest osteomyelitis. Chronic changes as described.   Signed by: Real Mendieta 2024 7:15 PM Dictation workstation:   [id number]    CT chest abdomen pelvis wo IV contrast    Result Date: 2024  Interpreted By:  Maria Garcia, STUDY: CT CHEST ABDOMEN PELVIS WO CONTRAST;  2024 11:42 pm   INDICATION: Mental status change. Elevated white blood cell count with infectious workup.   COMPARISON: 2023   ACCESSION NUMBER(S): [id number]   ORDERING CLINICIAN: BAN GIFFORD   TECHNIQUE: CT of the chest, abdomen and pelvis was performed with no oral or intravenous contrast administered. Sagittal and coronal reformations were completed by the technologist at the acquisition  scanner.   All CT examinations are performed with 1 or more of the following dose reduction techniques: Automated exposure control, adjustment of mA and/or kv according to patient's size, or use of iterative reconstruction techniques.   FINDINGS: Please note that the study is limited without intravenous contrast.   CHEST: Bilateral pleural effusions are present with right effusion moderately small in size and with the left effusion moderately small in size as well. There is shift of the heart and mediastinum to the left of midline due to atelectasis of the left upper lobe. There is consolidation throughout left lower lobe with air bronchograms noted. On the right, there is compressive atelectasis seen posteriorly in the right lower lobe.   There is mild reactive mediastinal adenopathy seen at this time with mediastinal lymph nodes increased in size since prior study. Several of these mediastinal nodes are at the upper limits of normal measuring 8 mm in short axis diameter.   No pericardial effusion is present. The cardiac size is normal with mitral annulus calcification.   There has been prior bilateral mastectomy with reconstruction of the left breast with implant placement. Surgical clips are visible within the left axilla. Stent grafts are visible within the left upper extremity and within the left innominate, subclavian as well as axillary veins.   A peripherally calcified 1.8 cm nodule arises from the lower pole of left thyroid lobe.   ABDOMEN:   LIVER: Unremarkable.   BILE DUCTS: No intrahepatic or extrahepatic biliary ductal dilatation is seen.   GALLBLADDER: Cholelithiasis is observed with some calcification of the gallbladder wall demonstrated as well. No gallbladder wall thickening or pericholecystic fluid is evident.   PANCREAS: Unremarkable   SPLEEN: Unremarkable   ADRENAL GLANDS: Unremarkable   KIDNEYS AND URETERS: Kidneys are small in size with extensive renal artery calcification demonstrated.    PELVIS:   BLADDER: Amos catheter is present within the decompressed urinary bladder.   REPRODUCTIVE ORGANS: Calcification of the arcuate arteries within the uterus is seen. There is no uterine enlargement or adnexal mass.   BOWEL: A rectal balloon is seen. There is no abnormal distention of the intestinal tract.   VESSELS: There is atherosclerosis of the thoracoabdominal aorta and iliac arteries with calcification in the walls of the celiac, splenic, hepatic, and mesenteric arteries.   PERITONEUM/RETROPERITONEUM/LYMPH NODES: There is a minimal amount of ascites seen about the liver and spleen with mild presacral edema noted.   ABDOMINAL WALL: There is anasarca involving the soft tissues of the abdomen and pelvis. Postoperative change from ventral hernia repair is seen.   BONES: Bilateral sacroiliitis is seen. There is lumbar dextroscoliosis. Chronic rotator cuff tear is present bilaterally with osteoarthritis of both shoulders, right greater than left.       Chest 1.  Moderately small bilateral pleural effusions with left upper lobe atelectasis and compressive atelectasis seen posteriorly in right lower lobe. A left lower lobe pneumonia is identified. There is extensive airspace consolidation within left lower lobe with air bronchograms observed. Mild mediastinal reactive adenopathy is present as well.   Abdomen-Pelvis 1.  Cholelithiasis without evidence of cholecystitis. 2. Small amount of ascites with mild presacral edema and anasarca within the soft tissues of the abdominal wall. 3. Renal atrophy with extensive vascular calcifications.     MACRO: None   Signed by: Maria Garcia 1/21/2024 8:30 AM Dictation workstation:   [id number]    CT head wo IV contrast    Result Date: 1/21/2024  Interpreted By:  Maria Garcia, STUDY: CT HEAD WO IV CONTRAST 1/20/2024 11:42 pm   INDICATION: Signs/Symptoms:mental status changes   COMPARISON: 12/11/2023   ACCESSION NUMBER(S): [id number]   ORDERING CLINICIAN: BAN GIFFORD    TECHNIQUE: Unenhanced axial images of the brain are completed.   All CT examinations are performed with 1 or more of the following dose reduction techniques: Automated exposure control, adjustment of mA and/or kv according to patient's size, or use of iterative reconstruction techniques.   FINDINGS: Helical unenhanced axial images of the brain demonstrate a mild to moderate degree of ventricular enlargement with proportionate widening of the sulci and sylvian fissures. There is no midline shift, mass effect, extra-axial fluid collection, or acute intracranial hemorrhage. There is diminished density seen in the periventricular white matter indicating chronic microvascular ischemic disease. There is calcified plaque seen within the distal vertebral and internal carotid arteries bilaterally. No calvarial abnormality is seen.       Atrophy and chronic microvascular ischemic disease without acute intracranial process.   Signed by: Maria Garcia 1/21/2024 8:09 AM Dictation workstation:   [id number]    XR chest 1 view    Result Date: 1/20/2024  Interpreted By:  Brendon Perez, STUDY: XR CHEST 1 VIEW; 1/20/2024 5:03 pm   INDICATION: CLINICAL INFORMATION: Signs/Symptoms:Insertion right IJ central line, also left thoracentesis.   COMPARISON: 01/19/2024 at 1338 hours   ACCESSION NUMBER(S): [id number]   ORDERING CLINICIAN: BOZENA ANTONIO   TECHNIQUE: Portable chest one view.   FINDINGS: The cardiac size is indeterminate in view of the AP projection. There is no change in the right-sided dual port central venous catheter. There is a new right internal jugular venous catheter present with the tip at approximately same level as the dual port central venous catheter, overlying the mid to lower right atrium. There is no evidence for pneumothorax. Patient has a history of left thoracentesis without evidence for pneumothorax on the left. There is bilateral basilar alveolar infiltrate and effusions. Left effusion is decreased compared  to the study from 1 day earlier.   Surgical clips are identified within left chest wall. Endovascular stent is identified in the distribution of the left subclavian artery.       1. Status post right-sided central venous catheter placement as described above with the tip overlying the mid to caudal aspect of the right atrium. There is no evidence for pneumothorax. 2. No evidence for left-sided pneumothorax after left-sided thoracentesis. Decrease in the left effusion. 3. Bilateral basilar infiltrates and effusions are present. Follow-up to assure complete clearing is suggested.   MACRO: none   Signed by: Brendon Perez 1/20/2024 5:11 PM Dictation workstation:   [id number]    XR chest 1 view    Result Date: 1/19/2024  Interpreted By:  Real Mendieta, STUDY: XR CHEST 1 VIEW; 1/19/2024 1:38 pm   INDICATION: Signs/Symptoms:dyspnea.   COMPARISON: 12/26/2023   ACCESSION NUMBER(S): [id number]   ORDERING CLINICIAN: EMELY GOLDSMITH   TECHNIQUE: 1 view of the chest was performed.   FINDINGS: There may be a minimal right pleural effusion. Slight prominence of the interstitium otherwise the right lung is clear. Improved appearance of the left lung with improved aeration of the right upper lobe. There is opacification of the left lower lobe possibly due to large pleural effusion which is similar to the prior study. No pneumothorax. Right-sided dual lumen central line catheter with tip at the proximal atrium. The cardiomediastinal silhouette is within normal limits. Left-sided subclavian stents are again noted.       Improved aeration and appearance of the left lung superiorly however there is a persistent large left pleural effusion and opacification of the left lower lobe.   Signed by: Real Mendieta 1/19/2024 1:44 PM Dictation workstation:   [id number]    Electrocardiogram, 12-lead PRN ACS symptoms    Result Date: 1/7/2024  Sinus rhythm Right bundle branch block Septal infarct (cited on or before 06-DEC-2023) Abnormal  ECG When compared with ECG of 06-DEC-2023 13:06, Questionable change in initial forces of Septal leads Confirmed by Herminio Lemus ([id number]) on 1/7/2024 11:04:01 AM    Assessment/Plan     Septic shock-on pressors  Left-sided pleural effusion   Left lower lobe pneumonia  Proteus urinary tract infection  History of MRSA endocarditis  History of C. 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

## 2024-01-25 NOTE — PROGRESS NOTES
Patient not medically clear. Patient remains in the ICU. Patient on Levo and Vaso. At this time there is not a safe discharge plan in place. Patient admitted from [facility]. Patient's family thinking that they want patient to go to [facility] at discharge. Referral has not been sent. Patient will need therapy evaluation. Will follow.      **PATIENT DOES NOT HAVE A SAFE DISCHARGE PLAN      Shauna Jensen RN

## 2024-01-25 NOTE — PROGRESS NOTES
Speech-Language Pathology    Inpatient  Speech-Language Pathology Treatment     Patient Name: Ayanna Gross  MRN: [id number]  Today's Date: 1/25/2024  Time Calculation  Start Time: 1425  Stop Time: 1445  Time Calculation (min): 20 min         Current Problem:   1. Anemia due to blood loss, acute        2. Anemia due to chronic kidney disease, on chronic dialysis (CMS/HCC)        3. Shortness of breath        4. Fever, unspecified fever cause        5. Sepsis, due to unspecified organism, unspecified whether acute organ dysfunction present (CMS/HCC)        6. Septic shock (CMS/HCC)  Transthoracic Echo (TTE) Complete    Transthoracic Echo (TTE) Complete      7. Toxic metabolic encephalopathy  Transthoracic Echo (TTE) Complete    Transthoracic Echo (TTE) Complete      8. Chronic combined systolic and diastolic heart failure (CMS/HCC)  Transthoracic Echo (TTE) Complete    Transthoracic Echo (TTE) Complete      9. Localized edema  Upper extremity venous duplex bilateral    Upper extremity venous duplex bilateral      10. Other specified soft tissue disorders  Upper extremity venous duplex bilateral      11. Other specified soft tissue disorders  Upper extremity venous duplex bilateral            SLP Assessment:  SLP TX Intervention Outcome: Making Progress Towards Goals  Prognosis: Good  Treatment Provided: Yes   Treatment Tolerance: Patient tolerated treatment well  Medical Staff Made Aware: Yes  Strengths: Family/Caregiver Suppport  Barriers: Cognition, Comorbidities  Education Provided: Yes       Plan:  SLP TX Plan: Discharge from Speech Therapy  SLP Plan: No skilled SLP  No Skilled SLP: At baseline function    Subjective   Pt alert, oriented x 4, states she has no difficulty with swallowing, wants a regular diet, spouse @ bedside, per RN pt sometimes pockets meds, refuses to take with puree. PO intake improvong     General Visit Information:   Arrival: Family/caregiver present  Prior to Session Communication: Bedside  nurse (Akilah)    Pain Assessment:   Pain Assessment: 0-10  Pain Score: 0 - No pain    Objective   Therapeutic Swallow:  Therapeutic Swallow Intervention : Compensatory Strategies, PO Trials  Solid Diet Recommendations: Regular (IDDSI Level 7)  Liquid Diet Recommendations: Thin (IDDSI Level 0)  Swallow Comments: Tolerated 4 oz thin liquids via straw, 1 marshall cracker without s/s aspiration, mastication slow but adequate, complete oral clearance, taking small bites and sips, required min assisst with feeding, RN states they assisst her.     Inpatient:  Education Documentation  Pt familily/nursing education provided re: regarding role of ST, purpose of reatment, clinical impressions, recommendations, discharge from ST,safe swallow strategies, Pt/caregiver/nurse verbalized understanding and agreement. pt requires reinforcement/assisstance      111.9

## 2024-01-25 NOTE — PROGRESS NOTES
Vancomycin Dosing by Pharmacy- Cessation of Therapy    Consult to pharmacy for vancomycin dosing has been discontinued by the prescriber, pharmacy will sign off at this time.    Please call pharmacy if there are further questions or re-enter a consult if vancomycin is resumed.     Kendall Alfonso, FaithD

## 2024-01-25 NOTE — CARE PLAN
The patient's goals for the shift include unable to state    The clinical goals for the shift include maintain hemodynamic, wean pressor support      Problem: Pain  Goal: My pain/discomfort is manageable  Outcome: Progressing     Problem: Safety  Goal: Patient will be injury free during hospitalization  Outcome: Progressing  Goal: I will remain free of falls  Outcome: Progressing     Problem: Daily Care  Goal: Daily care needs are met  Outcome: Progressing     Problem: Psychosocial Needs  Goal: Demonstrates ability to cope with hospitalization/illness  Outcome: Progressing  Goal: Collaborate with me, my family, and caregiver to identify my specific goals  Outcome: Progressing     Problem: Discharge Barriers  Goal: My discharge needs are met  Outcome: Progressing     Problem: Fall/Injury  Goal: Not fall by end of shift  Outcome: Progressing  Goal: Be free from injury by end of the shift  Outcome: Progressing  Goal: Verbalize understanding of personal risk factors for fall in the hospital  Outcome: Progressing  Goal: Verbalize understanding of risk factor reduction measures to prevent injury from fall in the home  Outcome: Progressing  Goal: Pace activities to prevent fatigue by end of the shift  Outcome: Progressing     Problem: Skin  Goal: Decreased wound size/increased tissue granulation at next dressing change  Outcome: Progressing  Flowsheets (Taken 1/24/2024 1440 by Nan Kern RN)  Decreased wound size/increased tissue granulation at next dressing change:   Promote sleep for wound healing   Protective dressings over bony prominences   Utilize specialty bed per algorithm  Goal: Participates in plan/prevention/treatment measures  Outcome: Progressing  Flowsheets (Taken 1/24/2024 0048 by Zaina Navarro RN)  Participates in plan/prevention/treatment measures:   Elevate heels   Increase activity/out of bed for meals   Discuss with provider PT/OT consult  Goal: Prevent/manage excess moisture  Outcome:  Progressing  Flowsheets (Taken 1/24/2024 0048 by Zaina Navarro, RN)  Prevent/manage excess moisture:   Monitor for/manage infection if present   Cleanse incontinence/protect with barrier cream   Moisturize dry skin   Follow provider orders for dressing changes  Goal: Prevent/minimize sheer/friction injuries  Outcome: Progressing  Flowsheets (Taken 1/24/2024 0048 by Zaina Navarro, RN)  Prevent/minimize sheer/friction injuries:   Use pull sheet   Increase activity/out of bed for meals   Turn/reposition every 2 hours/use positioning/transfer devices   HOB 30 degrees or less  Goal: Promote/optimize nutrition  Outcome: Progressing  Flowsheets (Taken 1/24/2024 0048 by Zaina Navarro, RN)  Promote/optimize nutrition:   Assist with feeding   Monitor/record intake including meals   Consume > 50% meals/supplements   Offer water/supplements/favorite foods  Goal: Promote skin healing  Outcome: Progressing  Flowsheets (Taken 1/24/2024 0048 by Zaina Navarro, RN)  Promote skin healing:   Assess skin/pad under line(s)/device(s)   Protective dressings over bony prominences   Turn/reposition every 2 hours/use positioning/transfer devices     Problem: Pain  Goal: Takes deep breaths with improved pain control throughout the shift  Outcome: Progressing  Goal: Turns in bed with improved pain control throughout the shift  Outcome: Progressing  Goal: Walks with improved pain control throughout the shift  Outcome: Progressing  Goal: Performs ADL's with improved pain control throughout shift  Outcome: Progressing  Goal: Participates in PT with improved pain control throughout the shift  Outcome: Progressing  Goal: Free from opioid side effects throughout the shift  Outcome: Progressing  Goal: Free from acute confusion related to pain meds throughout the shift  Outcome: Progressing     Problem: Respiratory  Goal: Clear secretions with interventions this shift  Outcome: Progressing  Goal: Minimize anxiety/maximize coping throughout  shift  Outcome: Progressing  Goal: Minimal/no exertional discomfort or dyspnea this shift  Outcome: Progressing  Goal: No signs of respiratory distress (eg. Use of accessory muscles. Peds grunting)  Outcome: Progressing  Goal: Patent airway maintained this shift  Outcome: Progressing  Goal: Tolerate pulmonary toileting this shift  Outcome: Progressing  Goal: Verbalize decreased shortness of breath this shift  Outcome: Progressing  Goal: Wean oxygen to maintain O2 saturation per order/standard this shift  Outcome: Progressing  Goal: Increase self care and/or family involvement in next 24 hours  Outcome: Progressing     Problem: Diabetes  Goal: Achieve decreasing blood glucose levels by end of shift  Outcome: Progressing  Goal: Increase stability of blood glucose readings by end of shift  Outcome: Progressing  Goal: Decrease in ketones present in urine by end of shift  Outcome: Progressing  Goal: Maintain electrolyte levels within acceptable range throughout shift  Outcome: Progressing  Goal: Maintain glucose levels >70mg/dl to <250mg/dl throughout shift  Outcome: Progressing  Goal: No changes in neurological exam by end of shift  Outcome: Progressing  Goal: Learn about and adhere to nutrition recommendations by end of shift  Outcome: Progressing  Goal: Vital signs within normal range for age by end of shift  Outcome: Progressing  Goal: Increase self care and/or family involovement by end of shift  Outcome: Progressing  Goal: Receive DSME education by end of shift  Outcome: Progressing

## 2024-01-25 NOTE — PROGRESS NOTES
[facility] Critical Care Medicine       Date:  1/25/2024  Patient:  Ayanna Gross  YOB: 1950  MRN:  [id number]   Admit Date:  1/19/2024  ========================================================================================================    Chief Complaint   Patient presents with    Shortness of Breath     Patient with SOB and fever. Is very lethargic. Dialysis MWF but did not go today dt SOB and fever         History of Present Illness:  Ayanna Gross is a 73 y.o. year old female patient with Past Medical History of  esenting with anemia, fever worsening sacral and lower extremity wounds and concerns from the nursing facility for left pneumonia. Patient presents to the emergency department found to be febrile she was hypotensive but responded to 500 cc fluid bolus she was found to have a hemoglobin of 6 she was begun and ordered for a single unit of blood transfusion will actually provide 2 units of packed cells additional IV fluids to complete her volume resuscitation given her fever clinical evidence of hypovolemia and infection with an elevated lactate of 2.3 she received a total of approximately 1800 mL of fluid she is DNR/DNI by advanced directives but ICU level care is acceptable her granddaughter questions disorder but it was confirmed and signed by her  who is her POA.  She will be admitted to the intensive care unit given her frail compromised state and high risk of morbidity and mortality necessitating IV antibiotics fluids blood transfusion and ultimately will receive dialysis likely tomorrow wound care consultation to general surgery has been requested as well as consultation with the intensivist infectious disease and nephrology wounds are cultured C. difficile is requested she remains on oral vancomycin for suppression given recent C. difficile infection in December she is initiated on pharmacy dosing of vancomycin and Zosyn 2.25 g IV every 6 hours. She continues on Eliquis  at this time for DVT prophylaxis as well as atrial fibrillation.     Further Hx: Ayanna Gross 73-year-old female with past medical history of atrial fibrillation, end-stage renal disease, C. difficile infection, moderate sized recurrent left pleural effusions, and MRSA bacteremia with mitral valve vegetation admitted to ICU for management of septic shock.  On morning exam patient was on 0.22 of levo just to maintain a MAP of 60 and SBP of 88.  Last documented echocardiogram from [facility] shows EF between 55 to 60% with grade 2 left ventricular diastolic dysfunction, and moderate circumferential pericardial effusion.  Unable to find adequate documentation of who treated her endocarditis, but there was documentation that she was currently still receiving treatment when she was admitted to [facility]. On further chart review Dr. Coulter is her infectious disease doctor. She also had 2 thoracentesis's for moderate to large sided left pleural effusions with cytology showing no malignancy, but unable to differentiate between exudative and transudative at this time.        Interval ICU Events:  1/20: Pt missed last dialysis appointment, no signs of urgent need today, but nephrology Dr. Cabello is following. Pt on high requirements of levophed. She is on broad spectrum abx for coverage currently ID Dr. Coulter following. Pt switches from being A&O x 3 and lethargy. Goal today is to get more central access, david, and thoracentesis.      1/21: Pt more lethargic this AM requiring uptitration of her Levophed to 0.28. Diagnostic and therapeutic thoracentesis yesterday over 1L of hazy straw color fluid. Currently still on vanc, zosyn, and doxy for abx. Potential dialysis this today, but no urgency.      1/22: Pt come in and out of lethargic states throughout the day. Still on high doses of levophed this morning. Per nephrologist Dr. Cabello titrate for an SBP goal of 70 and mentation. Left Pleural  fluid analysis resulted as transudative per lights criteria. Tablo today per nephrology. ID managing with empiric coverage. Source control may not have been met. Podiatry, wound, and surgery to evaluate multiple wounds over the body.      1/23: Pt received dialysis via tablo yesterday. Very similar status as yesterday from lethargic states to well mentation. Patient remains on Levophed still at 0.36 with empiric coverage with vanc + zosyn & doxy. Increase in patients total bilirubin today 0.9-1.6. Consulting services include: Wound, Vascular, Podiatry, Acute surgery, Palliative, Infectious Disease, and Nephrology.      1/24: Patient stable overnight, norepinephrine weaned down to 0.16 with stable pressures and new goal of systolics > 70       Medical History:  Past Medical History:   Diagnosis Date    Asthma     CAD (coronary artery disease)     CHF (congestive heart failure) (CMS/[facility]ly [facility])     Chronic kidney disease on chronic dialysis (CMS/Formerly [facility])     Hypertension     Personal history of diseases of the blood and blood-forming organs and certain disorders involving the immune mechanism     History of autoimmune disorder    Sleep apnea     Type 2 diabetes mellitus without complications (CMS/Formerly [facility]) 09/15/2022    Diabetes mellitus     Past Surgical History:   Procedure Laterality Date    CARPAL TUNNEL RELEASE  11/18/2013    Neuroplasty Decompression Median Nerve At Carpal Tunnel    HAND SURGERY  11/18/2013    Hand Surgery                                                                                                                                                          MASTECTOMY, PARTIAL  04/10/2014    Right Breast Partial Mastectomy    MR HEAD ANGIO WO IV CONTRAST  8/29/2023    MR HEAD ANGIO WO IV CONTRAST [facility]    OTHER SURGICAL HISTORY  11/18/2013    Breast Reconstruction With Implant Prosthesis    OTHER SURGICAL HISTORY  11/18/2013    Thyroid Surgery Substernal Thyroidectomy Partial    OTHER SURGICAL  HISTORY  11/18/2013    Modified Radical Mastectomy Left Breast    OTHER SURGICAL HISTORY  04/04/2022    Carpal tunnel surgery    OTHER SURGICAL HISTORY  04/04/2022    Foot surgery    OTHER SURGICAL HISTORY  04/04/2022    Hernia repair    SENTINEL LYMPH NODE BIOPSY  04/10/2014    [place] Lymph Node Biopsy    TONSILLECTOMY  11/18/2013    Tonsillectomy    UMBILICAL HERNIA REPAIR  11/18/2013    Umbilical Hernia Repair    US GUIDED PERCUTANEOUS PLACEMENT  6/29/2023    US GUIDED PERCUTANEOUS PLACEMENT [facility]     Medications Prior to Admission   Medication Sig Dispense Refill Last Dose    apixaban (Eliquis) 2.5 mg tablet Take 1 tablet (2.5 mg) by mouth 2 times a day.       blood sugar diagnostic strip 1 x daily e11.65 for 90 days       blood-glucose sensor (Dexcom G6 Sensor) device Check blood sugar when needed and as instructed 6 each 3     doxycycline (Vibramycin) 100 mg capsule Take 1 capsule (100 mg) by mouth once daily. Take with at least 8 ounces (large glass) of water, do not lie down for 30 minutes after Do not start before January 6, 2024.       escitalopram (Lexapro) 10 mg tablet 1 tablet Orally Once a day for 30 day(s)       febuxostat (Uloric) 40 mg tablet TAKE ONE TABLET BY MOUTH EVERY OTHER DAY       fenofibrate (Triglide) 160 mg tablet Take 1 tablet (160 mg) by mouth once daily.       gabapentin (Neurontin) 100 mg capsule Take 1 capsule (100 mg) by mouth 2 times a day.       insulin lispro (HumaLOG) 100 unit/mL injection Inject 0-0.1 mL (0-10 Units) under the skin 3 times a day with meals. Take as directed per insulin instructions.       midodrine (Proamatine) 5 mg tablet Take 3 tablets (15 mg) by mouth 3 times a day with meals.       nystatin (Mycostatin) 100,000 unit/gram powder APPLY 1 GRAM TO SKIN TWO TIMES A DAY 30 g 0     oxyCODONE-acetaminophen (Percocet) 5-325 mg tablet Take 1 tablet by mouth every 12 hours if needed for severe pain (7 - 10). 120 tablet 0     simvastatin (Zocor) 20 mg  tablet TAKE 1 TABLET BY MOUTH DAILY 60 tablet 5      Patient has no known allergies.  Social History     Tobacco Use    Smoking status: Never    Smokeless tobacco: Never   Substance Use Topics    Alcohol use: Not Currently    Drug use: Never     Family History   Problem Relation Name Age of Onset    Lymphoma Mother      Prostate cancer Father          passed away fabiana min 2017       [facility] Medications:    norepinephrine, 0.01-3 mcg/kg/min, Last Rate: 0.04 mcg/kg/min (01/25/24 1334)  vasopressin, 0.03 Units/min, Last Rate: 0.03 Units/min (01/25/24 1435)          Current Facility-Administered Medications:     acetaminophen (Tylenol) tablet 650 mg, 650 mg, oral, TID, Yusuf De La Garza PA-C, 650 mg at 01/25/24 0815    albumin human 5 % infusion 12.5 g, 12.5 g, intravenous, Once, Ronald Casey, APRN-CNP    apixaban (Eliquis) tablet 2.5 mg, 2.5 mg, oral, BID, Judd Tavera DO, 2.5 mg at 01/25/24 0814    dextrose 10 % in water (D10W) infusion, 0.3 g/kg/hr, intravenous, Once PRN, Judd Tavera DO    dextrose 50 % injection 25 g, 25 g, intravenous, q15 min PRN, Judd Tavera DO    doxycycline (Vibramycin) capsule 100 mg, 100 mg, oral, Daily, Yusuf De La Garza PA-C, 100 mg at 01/25/24 1302    escitalopram (Lexapro) tablet 10 mg, 10 mg, oral, Nightly, Judd Tavera DO, 10 mg at 01/24/24 2013    gabapentin (Neurontin) capsule 100 mg, 100 mg, oral, BID, Judd Tavera DO, 100 mg at 01/25/24 0815    glucagon (Glucagen) injection 1 mg, 1 mg, intramuscular, q15 min PRN, Judd Tavera DO    heparin 1,000 unit/mL injection 2,000 Units, 2,000 Units, intra-catheter, After Dialysis, Saeed Mondragon MD, 1,600 Units at 01/24/24 1631    heparin 1,000 unit/mL injection 2,000 Units, 2,000 Units, intra-catheter, After Dialysis, Saeed Mondragon MD, 1,600 Units at 01/24/24 1624    heparin 1,000 unit/mL injection 2,000 Units, 2,000 Units, intra-catheter, After Dialysis, Saeed Mondragon,  MD    heparin 1,000 unit/mL injection 2,000 Units, 2,000 Units, intra-catheter, After Dialysis, Saeed Mondragon MD    insulin lispro (HumaLOG) injection 0-10 Units, 0-10 Units, subcutaneous, TID with meals, Judd Tavera DO, 2 Units at 01/25/24 0908    ipratropium-albuteroL (Duo-Neb) 0.5-2.5 mg/3 mL nebulizer solution 3 mL, 3 mL, nebulization, q8h, LILLIE Moran, 3 mL at 01/25/24 1535    midodrine (Proamatine) tablet 15 mg, 15 mg, oral, TID with meals, Judd Tavera DO, 15 mg at 01/25/24 1301    norepinephrine (Levophed) 8 mg in dextrose 5% 250 mL (0.032 mg/mL) infusion (premix), 0.01-3 mcg/kg/min, intravenous, Continuous, Yusuf De La Garza PA-C, Last Rate: 5.57 mL/hr at 01/25/24 1334, 0.04 mcg/kg/min at 01/25/24 1334    nystatin (Mycostatin) 100,000 unit/gram powder, , Topical, BID, Judd Tavera DO, Given at 01/25/24 0900    nystatin (Mycostatin) cream, , Topical, BID, LILLIE Blair, Given at 01/25/24 0900    oxygen (O2) therapy, , inhalation, Continuous PRN - O2/gases, Judd Tavera DO, 4 L/min at 01/23/24 1600    pantoprazole (ProtoNix) EC tablet 40 mg, 40 mg, oral, Daily, 40 mg at 01/25/24 0815 **OR** pantoprazole (ProtoNix) injection 40 mg, 40 mg, intravenous, Daily, Yusuf De La Garza PA-C, 40 mg at 01/21/24 0824    sennosides-docusate sodium (Judy-Colace) 8.6-50 mg per tablet 1 tablet, 1 tablet, oral, Nightly PRN, Yusuf De La Garza PA-C    sodium chloride 3 % nebulizer solution 3 mL, 3 mL, nebulization, TID, Eduardivan MD Ngoc, 3 mL at 01/25/24 1535    vasopressin (Vasostrict) 0.2 unit/mL infusion, 0.03 Units/min, intravenous, Continuous, Dhiraj De La Garza MD, Last Rate: 9 mL/hr at 01/25/24 1435, 0.03 Units/min at 01/25/24 1435    zinc oxide 20 % ointment 1 Application, 1 Application, Topical, BID, MADHURI Blair-CNP, 1 Application at 01/25/24 0900    Review of Systems:  14 point review of systems was completed and negative except for those specially mention  in my HPI    Physical Exam:    Heart Rate:  [77-99]   Temp:  [35.3 °C (95.5 °F)-37.1 °C (98.8 °F)]   Resp:  [11-33]   SpO2:  [89 %-100 %]     Physical Exam  HENT:      Mouth/Throat:      Mouth: Mucous membranes are moist.      Pharynx: Oropharynx is clear.   Eyes:      Pupils: Pupils are equal, round, and reactive to light.   Cardiovascular:      Rate and Rhythm: Normal rate and regular rhythm.   Pulmonary:      Effort: Pulmonary effort is normal.   Abdominal:      General: Abdomen is flat.      Palpations: Abdomen is soft.   Musculoskeletal:         General: Normal range of motion.      Cervical back: Normal range of motion.   Skin:     Capillary Refill: Capillary refill takes less than 2 seconds.      Comments: Ulcers to bilateral heels and left shin  Ulcer to saccrum  Skin tear to Left hand  Edema to bilateral uppers but R>L   Neurological:      Mental Status: She is alert and oriented to person, place, and time.   Psychiatric:         Mood and Affect: Mood normal.         Objective:    I have reviewed all medications, laboratory results, and imaging pertinent for today's encounter.    FiO2 (%):  [28 %] 28 %      Intake/Output Summary (Last 24 hours) at 1/25/2024 1552  Last data filed at 1/25/2024 0800  Gross per 24 hour   Intake 678.7 ml   Output 1250 ml   Net -571.3 ml         Assessment/Plan:    I am currently managing this critically ill patient for the following problems:    Septic Shock   ESRD   Chronic Hypotension   Recurrent Pleural Effusions   Vasculopathy   Multiple Wounds with and with out infections  C. Diff infection  Acute Metabolic Encephalopathy    Neuro/Psych/Pain Ctrl/Sedation:  Acute Metabolic Encephalopathy  -Pain Control: Tylenol for mild  -CAM Assessment every shift, neuro assessments q4   -Palliative consulted   -restart Lexapro and gabapentin  -CT head No acute finding   -Will continue to monitor for acute encephalitis, currently appears to be clearing with less periods of confusion        Respiratory/ENT:  Ct chest showed multiple nodules on lung in August 23   Moderate to large left sided pleural effusion   -Currently on NC 2L, patient reports breathing easier and productive cough  -Will repeat CXR in case of acute respiratory decompensation for possible re accumulation  -Thoracentesis 1/20 over 1L of hazy straw colored fluid, Lights criteria suggest Transudative    -SP02 >92%, Nasal canula titration   -CT C/A/P A left lower lobe pneumonia is identified. 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  -Renal Low phos Low K assess mental status before feeding   -Flexacil for stools   -BR with senna/colace PRN   -Protonix daily   -Slightly uptrending bilirubin with no signs of jaundice or hepatic failure, Hepatic panel scheduled for am draw       Renal/Volume Status (Intra & Extravascular):  ESRD   Metabolic lactic acidosis-resolved  -Nephrology following for dialysis needs  -Elevated Lactate 2/2 hypoperfusion -resolved  -Conservative fluid management, will trial 250ml of 5% albumin to test intravascular volume status  -Daily BMP, Replete electrolytes as indicated for ESRD    -Continue Uloric for gout prophylaxis   -No tablo today, probably tomorrow    Endocrine  DM2   -SSI Q4h    Infectious Disease:  Hx of MRSA bacteremia with endocarditis  Multiple Wounds with and with out infections  C. Diff infection  -WBC 22.9 -> 21.0  -> 21.2 -> 16.5-> 13.7 Possible inadequate source control, consider  biopsy of wounds by ID   -Per ID add doxycycline 100mg every 24 hours   -blood cultures No growth day 3  -urine cultures grew Proteus, not suspected as source of sepsis especially in setting of anuresis    -ID Consulted, Appreciate ID consult  -C. Diff & MRSA PCR negative   -Vanc zosyn stopped today    Heme/Onc:  Anemia of chronic disease   -monitor for s/s anemia  -transfuse for hgb <7  -daily CBC  -Duplex upper extremity US negative for UE DVT    OBGYN/MSK:  Multiple Wounds with and with out infections  -Surgery consulted   -Wound care consulted for dressing other wounds   -Podiatry consulted  -XR Left ankle not suspicious for osteomyelitis   -XR Right ankle and tib/fib not suspicious for osteomyelitis   -padded pressure points  -ICU skin protocol    Ethics/Code Status:  Full code    :  DVT Prophylaxis: SQH  GI Prophylaxis: PPI  Bowel Regimen: Senna  Diet: Renal regular  CVC: R internal Jugular  Chandni: R radial  Amos: None  Restraints: None  Dispo: Will remain in ICU    Critical Care Time:  30 minutes spent in preparing to see patient (I.e. review of medical records), evaluation of diagnostics (I.e. labs, imaging, etc.), documentation, discussing plan of care with patient/ family/ caregiver, and/ or coordination of care with multidisciplinary team. Time does not include completion of procedure time.      Ronald Casey, APRN-CNP

## 2024-01-25 NOTE — PROGRESS NOTES
"Ayanna Gross is a 73 y.o. female on day 6 of admission presenting with Septic shock (CMS/HCC).    Subjective   On Levo and Vaso. Looks like she had a low H&H on this morning's blood work. Seems to be dilutional as they were re-drawn with levels consistent with yesterday.     Objective     Physical Exam  Constitutional:       Appearance: She is ill-appearing.   HENT:      Head: Normocephalic and atraumatic.   Cardiovascular:      Rate and Rhythm: Normal rate.   Pulmonary:      Effort: Pulmonary effort is normal.   Abdominal:      General: Abdomen is flat. Bowel sounds are normal.   Skin:     General: Skin is warm and dry.      Findings: Rash present.      Comments: Heel wounds with eschar. Sacral rash red and present to b/l buttocks and sacrum.    Neurological:      Mental Status: She is alert. She is disoriented.      Motor: Weakness present.         Last Recorded Vitals  Blood pressure 85/65, pulse 90, temperature 35.7 °C (96.3 °F), resp. rate 16, height 1.575 m (5' 2\"), weight 79.5 kg (175 lb 4.3 oz), SpO2 97 %.  Intake/Output last 3 Shifts:  I/O last 3 completed shifts:  In: 1532.1 (19.3 mL/kg) [P.O.:160; I.V.:872.1 (11 mL/kg); Blood:150; IV Piggyback:350]  Out: 1650 (20.8 mL/kg) [Other:1000; Stool:650]  Weight: 79.5 kg     Relevant Results  Lab Results   Component Value Date    WBC 13.7 (H) 01/25/2024    HGB 9.6 (L) 01/25/2024    HCT 28.9 (L) 01/25/2024    MCV 90 01/25/2024     01/25/2024     Lab Results   Component Value Date    GLUCOSE 164 (H) 01/25/2024    CALCIUM 8.5 01/25/2024     (L) 01/25/2024    K 3.6 01/25/2024    CO2 24 01/25/2024    CL 92 (L) 01/25/2024    BUN 9 01/25/2024    CREATININE 2.00 (H) 01/25/2024       Assessment/Plan   Principal Problem:    Septic shock (CMS/HCC)  Active Problems:    End stage renal disease (CMS/HCC)    Anemia due to blood loss, acute  1/25: Continue with Nystatin and Zinc paste to sacrum. Per podiatry note, her heel wounds are stable and should continue with " betadine and dry dressings. Should the wound become unstable, she may be able to have debridement. Will continue to follow for wound care.     1/24: Continue with nystatin and zinc paste to sacral area. Vascular saw patient and she is not a candidate for surgical intervention to heel wounds due to high dose pressor requirement. I do not see any recs from podiatry; however, per wound care RN note, there was a possibility of debridement today. I have reached out to podiatry. For now, continue to paint with betadine and cover with kerlex.    1/23: Moisture associated sacral rash washed with soap and water and a combination of triad and nystatin powder applied. Podiatry is currently present at the bedside, awaiting recs for heel wounds. Will follow for wound care.     1/22:Heel wounds to be evaluated by podiatry for possible debridement. For sacral dermatitis, clean with soap and water, dry completely and apply mixture of zinc oxide and nystatin.        I spent 15 minutes in the professional and overall care of this patient.      Nickolas Gallego, APRN-CNP    Nystatin and Zinc appear to be improving the groin. Still some remaining erythema/ excoriation of the sacral area. FMS in place. No debridement needed of the sacrum

## 2024-01-26 NOTE — PROGRESS NOTES
"Ayanna Gross is a 73 y.o. female on day 7 of admission presenting with Septic shock (CMS/HCC).    Subjective   No pain reported. Is able to move to her side when asked to view wounds    Objective     Physical Exam  Constitutional:       Appearance: She is ill-appearing.   HENT:      Head: Normocephalic and atraumatic.   Cardiovascular:      Rate and Rhythm: Normal rate.   Pulmonary:      Effort: Pulmonary effort is normal.   Abdominal:      General: Abdomen is flat. Bowel sounds are normal.   Skin:     General: Skin is warm and dry.      Findings: Rash present.      Comments: Heel wounds with eschar. Sacral rash red and present to b/l buttocks and sacrum.    Neurological:      Mental Status: She is alert. She is disoriented.      Motor: Weakness present.         Last Recorded Vitals  Blood pressure 85/65, pulse 95, temperature 36.9 °C (98.4 °F), temperature source Temporal, resp. rate 17, height 1.575 m (5' 2\"), weight 87.4 kg (192 lb 10.9 oz), SpO2 96 %.  Intake/Output last 3 Shifts:  I/O last 3 completed shifts:  In: 891.8 (11.2 mL/kg) [P.O.:610; I.V.:31.8 (0.4 mL/kg); Blood:250]  Out: 300 (3.8 mL/kg) [Stool:300]  Weight: 79.5 kg     Relevant Results  Lab Results   Component Value Date    WBC 12.8 (H) 01/26/2024    HGB 9.6 (L) 01/26/2024    HCT 29.8 (L) 01/26/2024    MCV 92 01/26/2024     01/26/2024     Lab Results   Component Value Date    GLUCOSE 158 (H) 01/26/2024    CALCIUM 8.8 01/26/2024     (L) 01/26/2024    K 3.7 01/26/2024    CO2 23 (L) 01/26/2024    CL 92 (L) 01/26/2024    BUN 11 01/26/2024    CREATININE 2.60 (H) 01/26/2024       Assessment/Plan   Principal Problem:    Septic shock (CMS/HCC)  Active Problems:    Pneumonia    Low blood pressure    End stage renal disease (CMS/HCC)    Anemia due to blood loss, acute  1/26: Continue nystatin and zinc, groin improving but sacral area still appears to have erythema but no surgical debridement indicated. Heel wounds per podiatry, will follow " peripherally over the weekend    1/25: Continue with Nystatin and Zinc paste to sacrum. Per podiatry note, her heel wounds are stable and should continue with betadine and dry dressings. Should the wound become unstable, she may be able to have debridement. Will continue to follow for wound care.     1/24: Continue with nystatin and zinc paste to sacral area. Vascular saw patient and she is not a candidate for surgical intervention to heel wounds due to high dose pressor requirement. I do not see any recs from podiatry; however, per wound care RN note, there was a possibility of debridement today. I have reached out to podiatry. For now, continue to paint with betadine and cover with kerlex.    1/23: Moisture associated sacral rash washed with soap and water and a combination of triad and nystatin powder applied. Podiatry is currently present at the bedside, awaiting recs for heel wounds. Will follow for wound care.     1/22:Heel wounds to be evaluated by podiatry for possible debridement. For sacral dermatitis, clean with soap and water, dry completely and apply mixture of zinc oxide and nystatin.        I spent 15 minutes in the professional and overall care of this patient.      Frances Braxton MD

## 2024-01-26 NOTE — PROGRESS NOTES
Ayanna Gross is a 73 y.o. female on day 7 of admission presenting with Septic shock (CMS/HCC).    Subjective   Interval History:   Afebrile, no chills  On vasopressin, and Levophed  Denies any pain  Denies shortness of breath, reports an occasional cough  Denies chest pain  Denies vomiting or diarrhea    Review of Systems   All other systems reviewed and are negative.      Objective   Range of Vitals (last 24 hours)  Heart Rate:  []   Temp:  [35.3 °C (95.5 °F)-36.8 °C (98.2 °F)]   Resp:  [11-25]   Weight:  [87.4 kg (192 lb 10.9 oz)]   SpO2:  [83 %-100 %]   Daily Weight  01/26/24 : 87.4 kg (192 lb 10.9 oz)    Body mass index is 35.24 kg/m².    Physical Exam  Constitutional:       Appearance: Normal appearance.   HENT:      Head: Normocephalic and atraumatic.      Nose: Nose normal.   Eyes:      Extraocular Movements: Extraocular movements intact.      Conjunctiva/sclera: Conjunctivae normal.   Cardiovascular:      Heart sounds: Normal heart sounds, S1 normal and S2 normal.   Pulmonary:      Breath sounds: Decreased breath sounds present.   Abdominal:      General: Bowel sounds are normal.      Palpations: Abdomen is soft.   Musculoskeletal:      Cervical back: Normal range of motion and neck supple.   Skin:     Comments: Stage III sacral ulcer, bilateral posterior heel eschar -photos examined  Neurological:      Mental Status: She is alert.     Antibiotics  aspirin tablet 325 mg  acetaminophen (Tylenol) tablet 650 mg  cefepime (Maxipime) 1 g in dextrose 5 % 50 mL IV  sodium chloride 0.9 % bolus 2,229 mL  apixaban (Eliquis) tablet 2.5 mg  escitalopram (Lexapro) tablet 10 mg  febuxostat (Uloric) tablet 40 mg  fenofibrate (Triglide) tablet 160 mg  gabapentin (Neurontin) capsule 100 mg  midodrine (Proamatine) tablet 15 mg  nystatin (Mycostatin) 100,000 unit/gram powder  oxyCODONE-acetaminophen (Percocet) 5-325 mg per tablet 1 tablet  simvastatin (Zocor) tablet 20 mg  piperacillin-tazobactam-dextrose (Zosyn) IV  2.25 g  vancomycin (Vancocin) capsule 125 mg  oxygen (O2) therapy  dextrose 50 % injection 25 g  glucagon (Glucagen) injection 1 mg  dextrose 10 % in water (D10W) infusion  insulin lispro (HumaLOG) injection 0-10 Units  nystatin (Mycostatin) 100,000 unit/gram powder 1 Application  vancomycin-diluent combo no.1 (Xellia) IVPB 1,750 mg  piperacillin-tazobactam-dextrose (Zosyn) IV 2.25 g  sodium chloride 0.9 % bolus 500 mL  norepinephrine (Levophed) 8 mg in dextrose 5% 250 mL (0.032 mg/mL) infusion (premix)  vancomycin (Vancocin) placeholder  pantoprazole (ProtoNix) EC tablet 40 mg  pantoprazole (ProtoNix) injection 40 mg  sennosides-docusate sodium (Judy-Colace) 8.6-50 mg per tablet 1 tablet  doxycycline (Vibramycin) in dextrose 5 % in water (D5W) 100 mL  mg  LORazepam (Ativan) injection 2 mg  magnesium sulfate IV 2 g  zinc oxide 20 % ointment 1 Application  nystatin (Mycostatin) cream  norepinephrine (Levophed) 8 mg in dextrose 5% 250 mL (0.032 mg/mL) infusion (premix)  heparin 1,000 unit/mL injection 2,000 Units  heparin 1,000 unit/mL injection 2,000 Units  nystatin (Mycostatin) ointment  acetaminophen (Tylenol) oral liquid 1,000 mg  albumin human 25 % solution 12.5 g  perflutren lipid microspheres (Definity) injection 0.5-10 mL of dilution  sulfur hexafluoride microsphr (Lumason) injection 24.28 mg  perflutren protein A microsphere (Optison) injection 0.5 mL  ipratropium-albuteroL (Duo-Neb) 0.5-2.5 mg/3 mL nebulizer solution 3 mL  sodium chloride 3 % nebulizer solution 3 mL  vancomycin-diluent combo no.1 (Xellia) IVPB 750 mg  sennosides-docusate sodium (Judy-Colace) 8.6-50 mg per tablet 1 tablet  acetaminophen (Tylenol) tablet 650 mg  doxycycline (Vibramycin) capsule 100 mg  magnesium oxide (Mag-Ox) tablet 400 mg  vasopressin (Vasostrict) 0.2 unit/mL infusion  norepinephrine (Levophed) 8 mg in dextrose 5% 250 mL (0.032 mg/mL) infusion (premix)  heparin 1,000 unit/mL injection 2,000 Units  heparin 1,000 unit/mL  injection 2,000 Units  albumin human 25 % solution 12.5 g  vancomycin (Xellia) 1 g in 200 mL (Xellia) IVPB 1 g      Relevant Results  Labs  Results from last 72 hours   Lab Units 01/26/24 0358 01/25/24 0340 01/25/24  0230 01/24/24  0500   WBC AUTO x10*3/uL 12.8* 13.7* 9.2 17.8*   HEMOGLOBIN g/dL 9.6* 9.6* 6.7* 10.7*   HEMATOCRIT % 29.8* 28.9* 21.1* 32.5*   PLATELETS AUTO x10*3/uL 151 150 96* 226   NEUTROS PCT AUTO %  --   --   --  69.5   LYMPHS PCT AUTO %  --   --   --  19.5   MONOS PCT AUTO %  --   --   --  7.2   EOS PCT AUTO %  --   --   --  2.1       Results from last 72 hours   Lab Units 01/26/24 0358 01/25/24  0340 01/25/24  0230   SODIUM mmol/L 130* 129* 134   POTASSIUM mmol/L 3.7 3.6 2.6*   CHLORIDE mmol/L 92* 92* 104   CO2 mmol/L 23* 24 18*   BUN mg/dL 11 9 7*   CREATININE mg/dL 2.60* 2.00* 1.50   GLUCOSE mg/dL 158* 164* 133*   CALCIUM mg/dL 8.8 8.5 6.3*   ANION GAP mmol/L 15 13 12   EGFR mL/min/1.73m*2 19* 26* 37*   PHOSPHORUS mg/dL 2.6 2.1* 1.6*       Results from last 72 hours   Lab Units 01/26/24 0358 01/25/24  0340 01/25/24  0230   ALK PHOS U/L 120 92 65   BILIRUBIN TOTAL mg/dL 1.5* 1.9* 1.5*   BILIRUBIN DIRECT mg/dL  --  1.4* 1.1*   PROTEIN TOTAL g/dL 5.1* 4.9* 3.6*   ALT U/L 9 10 5   AST U/L 26 23 15   ALBUMIN g/dL 2.8* 2.9* 2.2*       Estimated Creatinine Clearance: 20.4 mL/min (A) (by C-G formula based on SCr of 2.6 mg/dL (H)).  C-Reactive Protein   Date Value Ref Range Status   12/25/2023 5.20 (H) 0.00 - 2.00 mg/dL Final     CRP   Date Value Ref Range Status   06/23/2023 19.9 (H) 0 - 2.0 MG/DL Final     Comment:     Performed at [address] [address]   05/22/2022 1.0 0 - 2.0 MG/DL Final     Comment:     Performed at [address] [address]     Microbiology  Susceptibility data from last 14 days.  Collected Specimen Info Organism Ampicillin Cefazolin Cefazolin (uncomplicated UTIs only) Ciprofloxacin Gentamicin Piperacillin/Tazobactam Tetracycline  Trimethoprim/Sulfamethoxazole   01/19/24 Urine from Indwelling (Amos) Catheter Proteus mirabilis S S S S S S R S       Imaging  Upper extremity venous duplex bilateral    Result Date: 1/25/2024           [place] [id number]            Phone [phone]  Vascular Lab Report  [facility] US UPPER EXTREMITY VENOUS DUPLEX BILATERAL Patient Name:      BASIA GUAN GARY      Reading Physician:  [id number] Mini Busby MD, RPVI Study Date:        1/25/2024           Ordering Provider:  [id number] ERI BERMAN MRN/PID:           [id number]            Fellow: Accession#:        [id number]        Technologist:       Dara Parr RVT Date of Birth/Age: 1950 / 73 years Technologist 2: Gender:            F                   Encounter#:         [id number] Admission Status:  Inpatient           Location Performed: [facility]  Diagnosis/ICD: Right arm swelling-M79.89; Left arm swelling-M79.89 CPT Codes:     24730 Peripheral venous duplex scan for DVT complete  CONCLUSIONS:  Right Upper Venous: No evidence of acute deep vein thrombus visualized in the right upper extremity. Internal jugular vein was visualized in segments due to IV lines and bandages. Left Upper Venous: No evidence of acute deep vein thrombus visualized in the left upper extremity. Subclavian stent is noted and appears patent. There is a known occluded dialysis access noted.  Additional Findings: Technically difficult exam due to IV lines, bandages, and patient's positioning.  Imaging & Doppler Findings:  Right               Compressible Thrombus        Flow Internal Jugular        Yes        None   Spontaneous/Phasic Subclavian              Yes        None Subclavian Proximal     Yes        None   Spontaneous/Phasic Subclavian Mid          Yes        None Subclavian Distal       Yes        None   Spontaneous/Phasic Axillary                Yes        None        Pulsatile Brachial                Yes        None Cephalic                Yes        None Basilic                 Yes        None  Left                Compress Thrombus        Flow Internal Jugular      Yes      None       Pulsatile Subclavian            Yes      None Subclavian Proximal   Yes      None       Pulsatile Subclavian Mid        Yes      None       Pulsatile Subclavian Distal     Yes      None       Pulsatile Axillary              Yes      None   Spontaneous/Phasic Brachial              Yes      None Cephalic              Yes      None Basilic               Yes      None  [id number] Mini Busby MD, RPVI Electronically signed by [id number] Mini Busby MD, RPVI on 1/25/2024 at 4:06:13 PM  ** Final **      Assessment/Plan     Septic shock-on pressors-resolving  Left-sided pleural effusion   Left lower lobe pneumonia-treated  Proteus urinary tract infection-treated  History of MRSA endocarditis  History of C. 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

## 2024-01-26 NOTE — PROGRESS NOTES
CONSULT PROGRESS NOTES    SERVICE DATE: 1/26/2024   SERVICE TIME: 11:57 AM    CONSULTING SERVICE: Nephrology    ASSESSMENT AND PLAN   1.  End-stage renal disease  2.  Hypotension  3.  Hyponatremia  4.  Hypokalemia  5.  Anemia of chronic kidney disease     Dialysis today via [facility] again with low temperature, and attempting 1.5 L fluid removal.    She is extremely malnourished with low albumin, low prealbumin, no major muscle mass.  Hyponatremia from volume overload in this patient with oliguria.  For her hypokalemia, use a 3K bath.  Hemoglobin is at goal.  Can use IV albumin with hemodialysis.  Attempting to wean from pressors with a target systolic blood pressure greater than 70.  Typically, she has no major mental status changes with a blood pressure above 70.  Uremia should be mitigated with adequate hemodialysis that she has had this week.  Challenging case, lots of major medical problems, profound hypotension requiring pressors.  Doubtful HD will be needed this weekend.  Dr. Mcgee is available this weekend.  Next HD likely Monday.   Case d/w ROBIN Welch.       SUBJECTIVE  INTERVAL HPI: Seen during HD.  Still on 2 pressors.  Tolerating about 1.5L fluid removal today.  She is picking her right nares, bloody nose now.    MEDICATIONS:  acetaminophen, 650 mg, oral, TID  albumin human, 12.5 g, intravenous, q1h  apixaban, 2.5 mg, oral, BID  doxycylcine, 100 mg, oral, Daily  escitalopram, 10 mg, oral, Nightly  gabapentin, 100 mg, oral, BID  heparin, 2,000 Units, intra-catheter, After Dialysis  heparin, 2,000 Units, intra-catheter, After Dialysis  heparin, 2,000 Units, intra-catheter, After Dialysis  heparin, 2,000 Units, intra-catheter, After Dialysis  insulin lispro, 0-10 Units, subcutaneous, TID with meals  ipratropium-albuteroL, 3 mL, nebulization, q8h  midodrine, 15 mg, oral, TID with meals  nystatin, , Topical, BID  nystatin, , Topical, BID  pantoprazole, 40 mg, oral, Daily   Or  pantoprazole, 40 mg,  intravenous, Daily  sodium chloride, 3 mL, nebulization, TID  vancomycin, 125 mg, oral, Daily  zinc oxide, 1 Application, Topical, BID       norepinephrine, 0.01-3 mcg/kg/min, Last Rate: 0.07 mcg/kg/min (01/26/24 1100)  vasopressin, 0.03 Units/min, Last Rate: 0.03 Units/min (01/26/24 1100)       PRN medications: dextrose 10 % in water (D10W), dextrose, glucagon, oxygen, sennosides-docusate sodium     OBJECTIVE  PHYSICAL EXAM:   Heart Rate:  []   Temp:  [35.3 °C (95.5 °F)-36.8 °C (98.2 °F)]   Resp:  [11-25]   Weight:  [87.4 kg (192 lb 10.9 oz)]   SpO2:  [83 %-100 %]   Body mass index is 35.24 kg/m².  Chronically ill-appearing elderly white woman  Pale skin  R nares epistaxis  Right-sided hand swelling  Left-sided finger amputation  Internal jugular tunneled hemodialysis catheter in place  She has some pretibial edema on both lower extremities  Soft abdomen  No Amos  No obvious joint deformities  Moist mucosa  Hearing seems to be intact  Phonation intact  Arterial line placed  Right internal jugular triple-lumen catheter in place  Rectal tube       DATA:   Labs:  Results for orders placed or performed during the hospital encounter of 01/19/24 (from the past 96 hour(s))   Sedimentation rate, automated   Result Value Ref Range    Sedimentation Rate 49 (H) 0 - 30 mm/h   POCT GLUCOSE   Result Value Ref Range    POCT Glucose 128 (H) 74 - 99 mg/dL   Transthoracic Echo (TTE) Complete   Result Value Ref Range    AV pk david 0.84 m/s    LVOT diam 1.90 cm    MV E/A ratio 1.32     LVIDd 3.66 cm    AV pk grad 2.8 mmHg    Aortic Valve Area by Continuity of Peak Velocity 1.57 cm2    LV A4C EF 68.3    POCT GLUCOSE   Result Value Ref Range    POCT Glucose 145 (H) 74 - 99 mg/dL   CBC and Auto Differential   Result Value Ref Range    WBC 16.5 (H) 4.4 - 11.3 x10*3/uL    nRBC 0.2 (H) 0.0 - 0.0 /100 WBCs    RBC 3.62 (L) 4.00 - 5.20 x10*6/uL    Hemoglobin 10.7 (L) 12.0 - 16.0 g/dL    Hematocrit 32.4 (L) 36.0 - 46.0 %    MCV 90 80 -  100 fL    MCH 29.6 26.0 - 34.0 pg    MCHC 33.0 32.0 - 36.0 g/dL    RDW 24.0 (H) 11.5 - 14.5 %    Platelets 233 150 - 450 x10*3/uL    Neutrophils % 74.9 40.0 - 80.0 %    Immature Granulocytes %, Automated 0.7 0.0 - 0.9 %    Lymphocytes % 14.4 13.0 - 44.0 %    Monocytes % 6.6 2.0 - 10.0 %    Eosinophils % 3.0 0.0 - 6.0 %    Basophils % 0.4 0.0 - 2.0 %    Neutrophils Absolute 12.32 (H) 1.60 - 5.50 x10*3/uL    Immature Granulocytes Absolute, Automated 0.12 0.00 - 0.50 x10*3/uL    Lymphocytes Absolute 2.37 0.80 - 3.00 x10*3/uL    Monocytes Absolute 1.08 (H) 0.05 - 0.80 x10*3/uL    Eosinophils Absolute 0.50 (H) 0.00 - 0.40 x10*3/uL    Basophils Absolute 0.07 0.00 - 0.10 x10*3/uL   Comprehensive metabolic panel   Result Value Ref Range    Glucose 124 (H) 65 - 99 mg/dL    Sodium 128 (L) 133 - 145 mmol/L    Potassium 3.9 3.4 - 5.1 mmol/L    Chloride 93 (L) 97 - 107 mmol/L    Bicarbonate 22 (L) 24 - 31 mmol/L    Urea Nitrogen 15 8 - 25 mg/dL    Creatinine 2.60 (H) 0.40 - 1.60 mg/dL    eGFR 19 (L) >60 mL/min/1.73m*2    Calcium 8.0 (L) 8.5 - 10.4 mg/dL    Albumin 2.6 (L) 3.5 - 5.0 g/dL    Alkaline Phosphatase 119 35 - 125 U/L    Total Protein 5.1 (L) 5.9 - 7.9 g/dL    AST 16 5 - 40 U/L    Bilirubin, Total 1.6 (H) 0.1 - 1.2 mg/dL    ALT 10 5 - 40 U/L    Anion Gap 13 <=19 mmol/L   Magnesium   Result Value Ref Range    Magnesium 1.70 1.60 - 3.10 mg/dL   Phosphorus   Result Value Ref Range    Phosphorus 2.3 (L) 2.5 - 4.5 mg/dL   Morphology   Result Value Ref Range    RBC Morphology See Below     Target Cells Few     Ovalocytes Few     [place] Cells Few    POCT GLUCOSE   Result Value Ref Range    POCT Glucose 95 74 - 99 mg/dL   POCT GLUCOSE   Result Value Ref Range    POCT Glucose 146 (H) 74 - 99 mg/dL   POCT GLUCOSE   Result Value Ref Range    POCT Glucose 73 (L) 74 - 99 mg/dL   CBC and Auto Differential   Result Value Ref Range    WBC 17.8 (H) 4.4 - 11.3 x10*3/uL    nRBC 0.2 (H) 0.0 - 0.0 /100 WBCs    RBC 3.68 (L) 4.00 - 5.20  x10*6/uL    Hemoglobin 10.7 (L) 12.0 - 16.0 g/dL    Hematocrit 32.5 (L) 36.0 - 46.0 %    MCV 88 80 - 100 fL    MCH 29.1 26.0 - 34.0 pg    MCHC 32.9 32.0 - 36.0 g/dL    RDW 23.9 (H) 11.5 - 14.5 %    Platelets 226 150 - 450 x10*3/uL    Neutrophils % 69.5 40.0 - 80.0 %    Immature Granulocytes %, Automated 1.1 (H) 0.0 - 0.9 %    Lymphocytes % 19.5 13.0 - 44.0 %    Monocytes % 7.2 2.0 - 10.0 %    Eosinophils % 2.1 0.0 - 6.0 %    Basophils % 0.6 0.0 - 2.0 %    Neutrophils Absolute 12.37 (H) 1.60 - 5.50 x10*3/uL    Immature Granulocytes Absolute, Automated 0.19 0.00 - 0.50 x10*3/uL    Lymphocytes Absolute 3.47 (H) 0.80 - 3.00 x10*3/uL    Monocytes Absolute 1.28 (H) 0.05 - 0.80 x10*3/uL    Eosinophils Absolute 0.38 0.00 - 0.40 x10*3/uL    Basophils Absolute 0.10 0.00 - 0.10 x10*3/uL   Comprehensive metabolic panel   Result Value Ref Range    Glucose 155 (H) 65 - 99 mg/dL    Sodium 129 (L) 133 - 145 mmol/L    Potassium 3.8 3.4 - 5.1 mmol/L    Chloride 93 (L) 97 - 107 mmol/L    Bicarbonate 18 (L) 24 - 31 mmol/L    Urea Nitrogen 17 8 - 25 mg/dL    Creatinine 3.10 (H) 0.40 - 1.60 mg/dL    eGFR 16 (L) >60 mL/min/1.73m*2    Calcium 8.0 (L) 8.5 - 10.4 mg/dL    Albumin 2.2 (L) 3.5 - 5.0 g/dL    Alkaline Phosphatase 89 35 - 125 U/L    Total Protein 4.6 (L) 5.9 - 7.9 g/dL    AST 24 5 - 40 U/L    Bilirubin, Total 1.7 (H) 0.1 - 1.2 mg/dL    ALT 9 5 - 40 U/L    Anion Gap 18 <=19 mmol/L   Magnesium   Result Value Ref Range    Magnesium 1.60 1.60 - 3.10 mg/dL   Phosphorus   Result Value Ref Range    Phosphorus 2.7 2.5 - 4.5 mg/dL   Vancomycin   Result Value Ref Range    Vancomycin 19.2 10.0 - 20.0 ug/mL   Morphology   Result Value Ref Range    RBC Morphology See Below     Polychromasia Mild     Target Cells Few     [place] Cells Many     Pappenheimer Bodies Present    TSH   Result Value Ref Range    Thyroid Stimulating Hormone 6.10 (H) 0.27 - 4.20 mIU/L   POCT GLUCOSE   Result Value Ref Range    POCT Glucose 144 (H) 74 - 99 mg/dL   POCT  GLUCOSE   Result Value Ref Range    POCT Glucose 160 (H) 74 - 99 mg/dL   POCT GLUCOSE   Result Value Ref Range    POCT Glucose 119 (H) 74 - 99 mg/dL   POCT GLUCOSE   Result Value Ref Range    POCT Glucose 156 (H) 74 - 99 mg/dL   Comprehensive metabolic panel   Result Value Ref Range    Glucose 133 (H) 65 - 99 mg/dL    Sodium 134 133 - 145 mmol/L    Potassium 2.6 (LL) 3.4 - 5.1 mmol/L    Chloride 104 97 - 107 mmol/L    Bicarbonate 18 (L) 24 - 31 mmol/L    Urea Nitrogen 7 (L) 8 - 25 mg/dL    Creatinine 1.50 0.40 - 1.60 mg/dL    eGFR 37 (L) >60 mL/min/1.73m*2    Calcium 6.3 (L) 8.5 - 10.4 mg/dL    Albumin 2.2 (L) 3.5 - 5.0 g/dL    Alkaline Phosphatase 65 35 - 125 U/L    Total Protein 3.6 (L) 5.9 - 7.9 g/dL    AST 15 5 - 40 U/L    Bilirubin, Total 1.5 (H) 0.1 - 1.2 mg/dL    ALT 5 5 - 40 U/L    Anion Gap 12 <=19 mmol/L   Magnesium   Result Value Ref Range    Magnesium 1.30 (L) 1.60 - 3.10 mg/dL   Phosphorus   Result Value Ref Range    Phosphorus 1.6 (L) 2.5 - 4.5 mg/dL   CBC   Result Value Ref Range    WBC 9.2 4.4 - 11.3 x10*3/uL    nRBC 0.0 0.0 - 0.0 /100 WBCs    RBC 2.27 (L) 4.00 - 5.20 x10*6/uL    Hemoglobin 6.7 (L) 12.0 - 16.0 g/dL    Hematocrit 21.1 (L) 36.0 - 46.0 %    MCV 93 80 - 100 fL    MCH 29.5 26.0 - 34.0 pg    MCHC 31.8 (L) 32.0 - 36.0 g/dL    RDW 24.4 (H) 11.5 - 14.5 %    Platelets 96 (L) 150 - 450 x10*3/uL   Bilirubin, Direct   Result Value Ref Range    Bilirubin, Direct 1.1 (H) 0.0 - 0.2 mg/dL   CBC   Result Value Ref Range    WBC 13.7 (H) 4.4 - 11.3 x10*3/uL    nRBC 0.2 (H) 0.0 - 0.0 /100 WBCs    RBC 3.20 (L) 4.00 - 5.20 x10*6/uL    Hemoglobin 9.6 (L) 12.0 - 16.0 g/dL    Hematocrit 28.9 (L) 36.0 - 46.0 %    MCV 90 80 - 100 fL    MCH 30.0 26.0 - 34.0 pg    MCHC 33.2 32.0 - 36.0 g/dL    RDW 23.9 (H) 11.5 - 14.5 %    Platelets 150 150 - 450 x10*3/uL   Basic metabolic panel   Result Value Ref Range    Glucose 164 (H) 65 - 99 mg/dL    Sodium 129 (L) 133 - 145 mmol/L    Potassium 3.6 3.4 - 5.1 mmol/L     Chloride 92 (L) 97 - 107 mmol/L    Bicarbonate 24 24 - 31 mmol/L    Urea Nitrogen 9 8 - 25 mg/dL    Creatinine 2.00 (H) 0.40 - 1.60 mg/dL    eGFR 26 (L) >60 mL/min/1.73m*2    Calcium 8.5 8.5 - 10.4 mg/dL    Anion Gap 13 <=19 mmol/L   Phosphorus   Result Value Ref Range    Phosphorus 2.1 (L) 2.5 - 4.5 mg/dL   Magnesium   Result Value Ref Range    Magnesium 1.70 1.60 - 3.10 mg/dL   Hepatic function panel   Result Value Ref Range    AST 23 5 - 40 U/L    ALT 10 5 - 40 U/L    Alkaline Phosphatase 92 35 - 125 U/L    Bilirubin, Total 1.9 (H) 0.1 - 1.2 mg/dL    Bilirubin, Direct 1.4 (H) 0.0 - 0.2 mg/dL    Total Protein 4.9 (L) 5.9 - 7.9 g/dL    Albumin 2.9 (L) 3.5 - 5.0 g/dL   POCT GLUCOSE   Result Value Ref Range    POCT Glucose 167 (H) 74 - 99 mg/dL   POCT GLUCOSE   Result Value Ref Range    POCT Glucose 146 (H) 74 - 99 mg/dL   POCT GLUCOSE   Result Value Ref Range    POCT Glucose 151 (H) 74 - 99 mg/dL   POCT GLUCOSE   Result Value Ref Range    POCT Glucose 133 (H) 74 - 99 mg/dL   Comprehensive metabolic panel   Result Value Ref Range    Glucose 158 (H) 65 - 99 mg/dL    Sodium 130 (L) 133 - 145 mmol/L    Potassium 3.7 3.4 - 5.1 mmol/L    Chloride 92 (L) 97 - 107 mmol/L    Bicarbonate 23 (L) 24 - 31 mmol/L    Urea Nitrogen 11 8 - 25 mg/dL    Creatinine 2.60 (H) 0.40 - 1.60 mg/dL    eGFR 19 (L) >60 mL/min/1.73m*2    Calcium 8.8 8.5 - 10.4 mg/dL    Albumin 2.8 (L) 3.5 - 5.0 g/dL    Alkaline Phosphatase 120 35 - 125 U/L    Total Protein 5.1 (L) 5.9 - 7.9 g/dL    AST 26 5 - 40 U/L    Bilirubin, Total 1.5 (H) 0.1 - 1.2 mg/dL    ALT 9 5 - 40 U/L    Anion Gap 15 <=19 mmol/L   Magnesium   Result Value Ref Range    Magnesium 1.80 1.60 - 3.10 mg/dL   Vancomycin   Result Value Ref Range    Vancomycin 25.5 (H) 10.0 - 20.0 ug/mL   CBC   Result Value Ref Range    WBC 12.8 (H) 4.4 - 11.3 x10*3/uL    nRBC 0.2 (H) 0.0 - 0.0 /100 WBCs    RBC 3.25 (L) 4.00 - 5.20 x10*6/uL    Hemoglobin 9.6 (L) 12.0 - 16.0 g/dL    Hematocrit 29.8 (L) 36.0 -  46.0 %    MCV 92 80 - 100 fL    MCH 29.5 26.0 - 34.0 pg    MCHC 32.2 32.0 - 36.0 g/dL    RDW 24.5 (H) 11.5 - 14.5 %    Platelets 151 150 - 450 x10*3/uL   Phosphorus   Result Value Ref Range    Phosphorus 2.6 2.5 - 4.5 mg/dL   POCT GLUCOSE   Result Value Ref Range    POCT Glucose 167 (H) 74 - 99 mg/dL   POCT GLUCOSE   Result Value Ref Range    POCT Glucose 116 (H) 74 - 99 mg/dL         SIGNATURE: Saeed Mondragon MD PATIENT NAME: Ayanna Gross   DATE: January 26, 2024 MRN: [id number]   TIME: 11:57 AM PAGER: [phone]

## 2024-01-26 NOTE — CARE PLAN
The patient's goals for the shift include unable to state    The clinical goals for the shift include wean off pressors as tolerated      Problem: Pain  Goal: My pain/discomfort is manageable  1/25/2024 1941 by Isabella Miles RN  Outcome: Progressing  1/25/2024 1910 by Isabella Miles RN  Outcome: Progressing     Problem: Safety  Goal: Patient will be injury free during hospitalization  1/25/2024 1941 by Isabella Miles RN  Outcome: Progressing  1/25/2024 1910 by Isabella Miles RN  Outcome: Progressing  Goal: I will remain free of falls  1/25/2024 1941 by Isabella Miles RN  Outcome: Progressing  1/25/2024 1910 by Isabella Miles RN  Outcome: Progressing     Problem: Daily Care  Goal: Daily care needs are met  1/25/2024 1941 by Isabella Miles RN  Outcome: Progressing  1/25/2024 1910 by Isabella Miles RN  Outcome: Progressing     Problem: Psychosocial Needs  Goal: Demonstrates ability to cope with hospitalization/illness  1/25/2024 1941 by Isabella Miles RN  Outcome: Progressing  1/25/2024 1910 by Isabella Miles RN  Outcome: Progressing  Goal: Collaborate with me, my family, and caregiver to identify my specific goals  1/25/2024 1941 by Isabella Miles RN  Outcome: Progressing  1/25/2024 1910 by Isabella Miles RN  Outcome: Progressing     Problem: Discharge Barriers  Goal: My discharge needs are met  1/25/2024 1941 by Isabella Miles RN  Outcome: Progressing  1/25/2024 1910 by Isabella Miles RN  Outcome: Progressing     Problem: Fall/Injury  Goal: Not fall by end of shift  1/25/2024 1941 by Isabella Miles RN  Outcome: Progressing  1/25/2024 1910 by Isabella Miles RN  Outcome: Progressing  Goal: Be free from injury by end of the shift  1/25/2024 1941 by Isabella Milse RN  Outcome: Progressing  1/25/2024 1910 by Isabella Miles RN  Outcome: Progressing  Goal: Verbalize understanding of personal risk factors for fall in the hospital  1/25/2024 1941 by Isabella Miles RN  Outcome: Progressing  1/25/2024 1910 by Isabella Miles RN  Outcome:  Progressing  Goal: Verbalize understanding of risk factor reduction measures to prevent injury from fall in the home  1/25/2024 1941 by Isabella Miles RN  Outcome: Progressing  1/25/2024 1910 by Isabella Miles RN  Outcome: Progressing  Goal: Pace activities to prevent fatigue by end of the shift  1/25/2024 1941 by Isabella Miles RN  Outcome: Progressing  1/25/2024 1910 by Isabella Miles RN  Outcome: Progressing     Problem: Skin  Goal: Decreased wound size/increased tissue granulation at next dressing change  1/25/2024 1941 by Isabella Miles RN  Outcome: Progressing  Flowsheets (Taken 1/24/2024 1440 by Nan Kern RN)  Decreased wound size/increased tissue granulation at next dressing change:   Promote sleep for wound healing   Protective dressings over bony prominences   Utilize specialty bed per algorithm  1/25/2024 1910 by Isabella Miles RN  Outcome: Progressing  Goal: Participates in plan/prevention/treatment measures  1/25/2024 1941 by Isabella Miles RN  Outcome: Progressing  Flowsheets (Taken 1/24/2024 0048 by Zaina Navarro RN)  Participates in plan/prevention/treatment measures:   Elevate heels   Increase activity/out of bed for meals   Discuss with provider PT/OT consult  1/25/2024 1910 by Isabella Miles RN  Outcome: Progressing  Goal: Prevent/manage excess moisture  1/25/2024 1941 by Isabella Miles RN  Outcome: Progressing  Flowsheets (Taken 1/24/2024 0048 by Zaina Navarro RN)  Prevent/manage excess moisture:   Monitor for/manage infection if present   Cleanse incontinence/protect with barrier cream   Moisturize dry skin   Follow provider orders for dressing changes  1/25/2024 1910 by Isabella Miles RN  Outcome: Progressing  Goal: Prevent/minimize sheer/friction injuries  1/25/2024 1941 by Isabella Miles RN  Outcome: Progressing  Flowsheets (Taken 1/24/2024 0048 by Zaina Navarro RN)  Prevent/minimize sheer/friction injuries:   Use pull sheet   Increase activity/out of bed for meals   Turn/reposition every  2 hours/use positioning/transfer devices   HOB 30 degrees or less  1/25/2024 1910 by Isabella Miles RN  Outcome: Progressing  Goal: Promote/optimize nutrition  1/25/2024 1941 by Isabella Miles RN  Outcome: Progressing  Flowsheets (Taken 1/24/2024 0048 by Zaina Navarro, RN)  Promote/optimize nutrition:   Assist with feeding   Monitor/record intake including meals   Consume > 50% meals/supplements   Offer water/supplements/favorite foods  1/25/2024 1910 by Isabella Miles RN  Outcome: Progressing  Goal: Promote skin healing  1/25/2024 1941 by Isabella Miles RN  Outcome: Progressing  Flowsheets (Taken 1/24/2024 0048 by Zaina Navarro, RN)  Promote skin healing:   Assess skin/pad under line(s)/device(s)   Protective dressings over bony prominences   Turn/reposition every 2 hours/use positioning/transfer devices  1/25/2024 1910 by Isabella Miles RN  Outcome: Progressing     Problem: Pain  Goal: Takes deep breaths with improved pain control throughout the shift  1/25/2024 1941 by Isabella Miles RN  Outcome: Progressing  1/25/2024 1910 by Isabella Miles RN  Outcome: Progressing  Goal: Turns in bed with improved pain control throughout the shift  1/25/2024 1941 by Isabella Miles RN  Outcome: Progressing  1/25/2024 1910 by Isabella Miles RN  Outcome: Progressing  Goal: Walks with improved pain control throughout the shift  1/25/2024 1941 by Isabella Miles RN  Outcome: Progressing  1/25/2024 1910 by Isabella Miles RN  Outcome: Progressing  Goal: Performs ADL's with improved pain control throughout shift  1/25/2024 1941 by Isabella Miles RN  Outcome: Progressing  1/25/2024 1910 by Isabella Miles RN  Outcome: Progressing  Goal: Participates in PT with improved pain control throughout the shift  1/25/2024 1941 by Isabella Miles RN  Outcome: Progressing  1/25/2024 1910 by Isabella Miles RN  Outcome: Progressing  Goal: Free from opioid side effects throughout the shift  1/25/2024 1941 by Isabella Miles RN  Outcome: Progressing  1/25/2024 1910  by Isabella Ginger, RN  Outcome: Progressing  Goal: Free from acute confusion related to pain meds throughout the shift  1/25/2024 1941 by Isabella Miles RN  Outcome: Progressing  1/25/2024 1910 by Isabella Miles RN  Outcome: Progressing     Problem: Respiratory  Goal: Clear secretions with interventions this shift  1/25/2024 1941 by Isabella Miels RN  Outcome: Progressing  1/25/2024 1910 by Isabella Miles RN  Outcome: Progressing  Goal: Minimize anxiety/maximize coping throughout shift  1/25/2024 1941 by Isabella Miles RN  Outcome: Progressing  1/25/2024 1910 by Isabella Miles RN  Outcome: Progressing  Goal: Minimal/no exertional discomfort or dyspnea this shift  1/25/2024 1941 by Isabella Miles RN  Outcome: Progressing  1/25/2024 1910 by Isabella Miles RN  Outcome: Progressing  Goal: No signs of respiratory distress (eg. Use of accessory muscles. Peds grunting)  1/25/2024 1941 by Isabella Miles RN  Outcome: Progressing  1/25/2024 1910 by Isabella Miles RN  Outcome: Progressing  Goal: Patent airway maintained this shift  1/25/2024 1941 by Isabella Miles RN  Outcome: Progressing  1/25/2024 1910 by Isabella Miles RN  Outcome: Progressing  Goal: Tolerate pulmonary toileting this shift  1/25/2024 1941 by Isabella Miles RN  Outcome: Progressing  1/25/2024 1910 by Isabella Miles RN  Outcome: Progressing  Goal: Verbalize decreased shortness of breath this shift  1/25/2024 1941 by Isabella Miles RN  Outcome: Progressing  1/25/2024 1910 by Isabella Miles RN  Outcome: Progressing  Goal: Wean oxygen to maintain O2 saturation per order/standard this shift  1/25/2024 1941 by Isabella Miles RN  Outcome: Progressing  1/25/2024 1910 by Isabella Miles RN  Outcome: Progressing  Goal: Increase self care and/or family involvement in next 24 hours  1/25/2024 1941 by Isabella Miles RN  Outcome: Progressing  1/25/2024 1910 by Isabella Miles RN  Outcome: Progressing     Problem: Diabetes  Goal: Achieve decreasing blood glucose levels by end of shift  1/25/2024  1941 by Isabella Miles RN  Outcome: Progressing  1/25/2024 1910 by Isabella Miles RN  Outcome: Progressing  Goal: Increase stability of blood glucose readings by end of shift  1/25/2024 1941 by Isabella Miles RN  Outcome: Progressing  1/25/2024 1910 by Isabella Miles RN  Outcome: Progressing  Goal: Decrease in ketones present in urine by end of shift  1/25/2024 1941 by Isabella Miles RN  Outcome: Progressing  1/25/2024 1910 by Isabella Miles RN  Outcome: Progressing  Goal: Maintain electrolyte levels within acceptable range throughout shift  1/25/2024 1941 by Isabella Miles RN  Outcome: Progressing  1/25/2024 1910 by Isabella Miles RN  Outcome: Progressing  Goal: Maintain glucose levels >70mg/dl to <250mg/dl throughout shift  1/25/2024 1941 by Isabella Miles RN  Outcome: Progressing  1/25/2024 1910 by Isabella Miles RN  Outcome: Progressing  Goal: No changes in neurological exam by end of shift  1/25/2024 1941 by Isabella Miles RN  Outcome: Progressing  1/25/2024 1910 by Isabella Miles RN  Outcome: Progressing  Goal: Learn about and adhere to nutrition recommendations by end of shift  1/25/2024 1941 by Isabella Miles RN  Outcome: Progressing  1/25/2024 1910 by Isabella Miles RN  Outcome: Progressing  Goal: Vital signs within normal range for age by end of shift  1/25/2024 1941 by Isabella Miles RN  Outcome: Progressing  1/25/2024 1910 by Isabella Miles RN  Outcome: Progressing  Goal: Increase self care and/or family involovement by end of shift  1/25/2024 1941 by Isabella Miles RN  Outcome: Progressing  1/25/2024 1910 by Isabella Miles RN  Outcome: Progressing  Goal: Receive DSME education by end of shift  1/25/2024 1941 by Isabella Miles RN  Outcome: Progressing  1/25/2024 1910 by Isabella Miles RN  Outcome: Progressing     Problem: Discharge Planning  Goal: Discharge to home or other facility with appropriate resources  1/25/2024 1941 by Isabella Miles RN  Outcome: Progressing  1/25/2024 1910 by Isabella Miles RN  Outcome:  Progressing     Problem: Chronic Conditions and Co-morbidities  Goal: Patient's chronic conditions and co-morbidity symptoms are monitored and maintained or improved  1/25/2024 1941 by Isabella Miles RN  Outcome: Progressing  1/25/2024 1910 by Isabella Miles RN  Outcome: Progressing

## 2024-01-26 NOTE — PROGRESS NOTES
"Ayanna Gross is a 73 y.o. female on day 7 of admission presenting with Septic shock (CMS/HCC).    Subjective   On Levo and Vaso, weaning down today. On dialysis currently.  No reported fevers. Dialysis line is patent. Wounds are stable. Per RN update, pt is not taking much Christian or supplements.     Objective     Physical Exam  Constitutional:       Appearance: She is ill-appearing.   HENT:      Head: Normocephalic and atraumatic.   Cardiovascular:      Rate and Rhythm: Normal rate.   Pulmonary:      Effort: Pulmonary effort is normal.   Abdominal:      General: Abdomen is flat. Bowel sounds are normal.   Skin:     General: Skin is warm and dry.      Findings: Rash present.      Comments: Heel wounds with eschar. Sacral rash red and present to b/l buttocks and sacrum.    Neurological:      Mental Status: She is alert. She is disoriented.      Motor: Weakness present.         Last Recorded Vitals  Blood pressure 85/65, pulse 95, temperature 36.9 °C (98.4 °F), temperature source Temporal, resp. rate 17, height 1.575 m (5' 2\"), weight 87.4 kg (192 lb 10.9 oz), SpO2 96 %.  Intake/Output last 3 Shifts:  I/O last 3 completed shifts:  In: 891.8 (11.2 mL/kg) [P.O.:610; I.V.:31.8 (0.4 mL/kg); Blood:250]  Out: 300 (3.8 mL/kg) [Stool:300]  Weight: 79.5 kg     Relevant Results  Lab Results   Component Value Date    WBC 12.8 (H) 01/26/2024    HGB 9.6 (L) 01/26/2024    HCT 29.8 (L) 01/26/2024    MCV 92 01/26/2024     01/26/2024     Lab Results   Component Value Date    GLUCOSE 158 (H) 01/26/2024    CALCIUM 8.8 01/26/2024     (L) 01/26/2024    K 3.7 01/26/2024    CO2 23 (L) 01/26/2024    CL 92 (L) 01/26/2024    BUN 11 01/26/2024    CREATININE 2.60 (H) 01/26/2024       Assessment/Plan   Principal Problem:    Septic shock (CMS/HCC)  Active Problems:    Pneumonia    Low blood pressure    End stage renal disease (CMS/HCC)    Anemia due to blood loss, acute  1/26/24: continue with daily wound care. Podiatry following for " heel wounds. Wait to see if permcath will be needed. Supplements, specialty mattress, daily wound care, Christian.     1/25: Continue with Nystatin and Zinc paste to sacrum. Per podiatry note, her heel wounds are stable and should continue with betadine and dry dressings. Should the wound become unstable, she may be able to have debridement. Will continue to follow for wound care.     1/24: Continue with nystatin and zinc paste to sacral area. Vascular saw patient and she is not a candidate for surgical intervention to heel wounds due to high dose pressor requirement. I do not see any recs from podiatry; however, per wound care RN note, there was a possibility of debridement today. I have reached out to podiatry. For now, continue to paint with betadine and cover with kerlex.    1/23: Moisture associated sacral rash washed with soap and water and a combination of triad and nystatin powder applied. Podiatry is currently present at the bedside, awaiting recs for heel wounds. Will follow for wound care.     1/22:Heel wounds to be evaluated by podiatry for possible debridement. For sacral dermatitis, clean with soap and water, dry completely and apply mixture of zinc oxide and nystatin.        I spent 15 minutes in the professional and overall care of this patient.      Nakita June, APRN-CNP    Still some irritation/ breakdown of skin over the sacral area. FMS diverting stool. Keep area dry. Change mepilex every 48 hours, continue nystatin to groin

## 2024-01-26 NOTE — PROGRESS NOTES
[facility] Critical Care Medicine       Date:  1/26/2024  Patient:  Ayanna Gross  YOB: 1950  MRN:  [id number]   Admit Date:  1/19/2024  ========================================================================================================    Chief Complaint   Patient presents with    Shortness of Breath     Patient with SOB and fever. Is very lethargic. Dialysis MWF but did not go today dt SOB and fever         History of Present Illness:  Ayanna Gross is a 73 y.o. year old female patient with Past Medical History of  esenting with anemia, fever worsening sacral and lower extremity wounds and concerns from the nursing facility for left pneumonia. Patient presents to the emergency department found to be febrile she was hypotensive but responded to 500 cc fluid bolus she was found to have a hemoglobin of 6 she was begun and ordered for a single unit of blood transfusion will actually provide 2 units of packed cells additional IV fluids to complete her volume resuscitation given her fever clinical evidence of hypovolemia and infection with an elevated lactate of 2.3 she received a total of approximately 1800 mL of fluid she is DNR/DNI by advanced directives but ICU level care is acceptable her granddaughter questions disorder but it was confirmed and signed by her  who is her POA.  She will be admitted to the intensive care unit given her frail compromised state and high risk of morbidity and mortality necessitating IV antibiotics fluids blood transfusion and ultimately will receive dialysis likely tomorrow wound care consultation to general surgery has been requested as well as consultation with the intensivist infectious disease and nephrology wounds are cultured C. difficile is requested she remains on oral vancomycin for suppression given recent C. difficile infection in December she is initiated on pharmacy dosing of vancomycin and Zosyn 2.25 g IV every 6 hours. She continues on Eliquis  at this time for DVT prophylaxis as well as atrial fibrillation.     Further Hx: Ayanna Gross 73-year-old female with past medical history of atrial fibrillation, end-stage renal disease, C. difficile infection, moderate sized recurrent left pleural effusions, and MRSA bacteremia with mitral valve vegetation admitted to ICU for management of septic shock.  On morning exam patient was on 0.22 of levo just to maintain a MAP of 60 and SBP of 88.  Last documented echocardiogram from [facility] shows EF between 55 to 60% with grade 2 left ventricular diastolic dysfunction, and moderate circumferential pericardial effusion.  Unable to find adequate documentation of who treated her endocarditis, but there was documentation that she was currently still receiving treatment when she was admitted to [facility]. On further chart review Dr. Coulter is her infectious disease doctor. She also had 2 thoracentesis's for moderate to large sided left pleural effusions with cytology showing no malignancy, but unable to differentiate between exudative and transudative at this time.          Interval ICU Events:  1/20: Pt missed last dialysis appointment, no signs of urgent need today, but nephrology Dr. Cabello is following. Pt on high requirements of levophed. She is on broad spectrum abx for coverage currently ID Dr. Coulter following. Pt switches from being A&O x 3 and lethargy. Goal today is to get more central access, david, and thoracentesis.      1/21: Pt more lethargic this AM requiring uptitration of her Levophed to 0.28. Diagnostic and therapeutic thoracentesis yesterday over 1L of hazy straw color fluid. Currently still on vanc, zosyn, and doxy for abx. Potential dialysis this today, but no urgency.      1/22: Pt come in and out of lethargic states throughout the day. Still on high doses of levophed this morning. Per nephrologist Dr. Cabello titrate for an SBP goal of 70 and mentation. Left  Pleural fluid analysis resulted as transudative per lights criteria. Tablo today per nephrology. ID managing with empiric coverage. Source control may not have been met. Podiatry, wound, and surgery to evaluate multiple wounds over the body.      1/23: Pt received dialysis via tablo yesterday. Very similar status as yesterday from lethargic states to well mentation. Patient remains on Levophed still at 0.36 with empiric coverage with vanc + zosyn & doxy. Increase in patients total bilirubin today 0.9-1.6. Consulting services include: Wound, Vascular, Podiatry, Acute surgery, Palliative, Infectious Disease, and Nephrology.      1/24: Patient stable overnight, norepinephrine weaned down to 0.16 with stable pressures and new goal of systolics > 70     1/25: Patient resting comfortably in bed this morning requiring 0.05 of Levophed for systolic goal of 70. She is more alert than prior and complains of less pain. She will receive Tablo today with 1.5L of fluid removed per Nephrology.     Medical History:  Past Medical History:   Diagnosis Date    Asthma     CAD (coronary artery disease)     CHF (congestive heart failure) (CMS/[facility])     Chronic kidney disease on chronic dialysis (CMS/[facility])     Hypertension     Personal history of diseases of the blood and blood-forming organs and certain disorders involving the immune mechanism     History of autoimmune disorder    Sleep apnea     Type 2 diabetes mellitus without complications (CMS/[facility]) 09/15/2022    Diabetes mellitus     Past Surgical History:   Procedure Laterality Date    CARPAL TUNNEL RELEASE  11/18/2013    Neuroplasty Decompression Median Nerve At Carpal Tunnel    HAND SURGERY  11/18/2013    Hand Surgery                                                                                                                                                          MASTECTOMY, PARTIAL  04/10/2014    Right Breast Partial Mastectomy    MR HEAD ANGIO WO IV CONTRAST  8/29/2023    MR  HEAD ANGIO WO IV CONTRAST [facility]    OTHER SURGICAL HISTORY  11/18/2013    Breast Reconstruction With Implant Prosthesis    OTHER SURGICAL HISTORY  11/18/2013    Thyroid Surgery Substernal Thyroidectomy Partial    OTHER SURGICAL HISTORY  11/18/2013    Modified Radical Mastectomy Left Breast    OTHER SURGICAL HISTORY  04/04/2022    Carpal tunnel surgery    OTHER SURGICAL HISTORY  04/04/2022    Foot surgery    OTHER SURGICAL HISTORY  04/04/2022    Hernia repair    SENTINEL LYMPH NODE BIOPSY  04/10/2014    Shingleton Lymph Node Biopsy    TONSILLECTOMY  11/18/2013    Tonsillectomy    UMBILICAL HERNIA REPAIR  11/18/2013    Umbilical Hernia Repair    US GUIDED PERCUTANEOUS PLACEMENT  6/29/2023    US GUIDED PERCUTANEOUS PLACEMENT [facility]     Medications Prior to Admission   Medication Sig Dispense Refill Last Dose    apixaban (Eliquis) 2.5 mg tablet Take 1 tablet (2.5 mg) by mouth 2 times a day.       blood sugar diagnostic strip 1 x daily e11.65 for 90 days       blood-glucose sensor (Dexcom G6 Sensor) device Check blood sugar when needed and as instructed 6 each 3     doxycycline (Vibramycin) 100 mg capsule Take 1 capsule (100 mg) by mouth once daily. Take with at least 8 ounces (large glass) of water, do not lie down for 30 minutes after Do not start before January 6, 2024.       escitalopram (Lexapro) 10 mg tablet 1 tablet Orally Once a day for 30 day(s)       febuxostat (Uloric) 40 mg tablet TAKE ONE TABLET BY MOUTH EVERY OTHER DAY       fenofibrate (Triglide) 160 mg tablet Take 1 tablet (160 mg) by mouth once daily.       gabapentin (Neurontin) 100 mg capsule Take 1 capsule (100 mg) by mouth 2 times a day.       insulin lispro (HumaLOG) 100 unit/mL injection Inject 0-0.1 mL (0-10 Units) under the skin 3 times a day with meals. Take as directed per insulin instructions.       midodrine (Proamatine) 5 mg tablet Take 3 tablets (15 mg) by mouth 3 times a day with meals.       nystatin (Mycostatin)  100,000 unit/gram powder APPLY 1 GRAM TO SKIN TWO TIMES A DAY 30 g 0     oxyCODONE-acetaminophen (Percocet) 5-325 mg tablet Take 1 tablet by mouth every 12 hours if needed for severe pain (7 - 10). 120 tablet 0     simvastatin (Zocor) 20 mg tablet TAKE 1 TABLET BY MOUTH DAILY 60 tablet 5      Patient has no known allergies.  Social History     Tobacco Use    Smoking status: Never    Smokeless tobacco: Never   Substance Use Topics    Alcohol use: Not Currently    Drug use: Never     Family History   Problem Relation Name Age of Onset    Lymphoma Mother      Prostate cancer Father          passed away fabiana pako [address] [place] [address] Medications:    norepinephrine, 0.01-3 mcg/kg/min, Last Rate: 0.04 mcg/kg/min (01/26/24 0830)  vasopressin, 0.03 Units/min, Last Rate: 0.03 Units/min (01/26/24 0800)          Current Facility-Administered Medications:     acetaminophen (Tylenol) tablet 650 mg, 650 mg, oral, TID, Yusuf De La Garza PA-C, 650 mg at 01/26/24 0810    apixaban (Eliquis) tablet 2.5 mg, 2.5 mg, oral, BID, Judd Tavera DO, 2.5 mg at 01/26/24 0810    dextrose 10 % in water (D10W) infusion, 0.3 g/kg/hr, intravenous, Once PRN, Judd Tavera DO    dextrose 50 % injection 25 g, 25 g, intravenous, q15 min PRN, Judd Tavera DO    doxycycline (Vibramycin) capsule 100 mg, 100 mg, oral, Daily, Yusuf De La Garza PA-C, 100 mg at 01/25/24 1302    escitalopram (Lexapro) tablet 10 mg, 10 mg, oral, Nightly, Judd Tavera DO, 10 mg at 01/25/24 2009    gabapentin (Neurontin) capsule 100 mg, 100 mg, oral, BID, Judd Tavera DO, 100 mg at 01/26/24 0810    glucagon (Glucagen) injection 1 mg, 1 mg, intramuscular, q15 min PRN, Judd Tavera DO    heparin 1,000 unit/mL injection 2,000 Units, 2,000 Units, intra-catheter, After Dialysis, Saeed Mondragon MD, 1,600 Units at 01/24/24 1631    heparin 1,000 unit/mL injection 2,000 Units, 2,000 Units, intra-catheter, After Dialysis, Saeed CASTRO  MD Giancarlo, 1,600 Units at 01/24/24 1624    heparin 1,000 unit/mL injection 2,000 Units, 2,000 Units, intra-catheter, After Dialysis, Saeed Mondragon MD    heparin 1,000 unit/mL injection 2,000 Units, 2,000 Units, intra-catheter, After Dialysis, Saeed Mondragon MD    insulin lispro (HumaLOG) injection 0-10 Units, 0-10 Units, subcutaneous, TID with meals, Judd Tavera DO, 2 Units at 01/26/24 0809    ipratropium-albuteroL (Duo-Neb) 0.5-2.5 mg/3 mL nebulizer solution 3 mL, 3 mL, nebulization, q8h, LILLIE Moran, 3 mL at 01/26/24 0801    midodrine (Proamatine) tablet 15 mg, 15 mg, oral, TID with meals, Jdud Tavera DO, 15 mg at 01/26/24 0810    norepinephrine (Levophed) 8 mg in dextrose 5% 250 mL (0.032 mg/mL) infusion (premix), 0.01-3 mcg/kg/min, intravenous, Continuous, Yusuf De La Garza PA-C, Last Rate: 5.57 mL/hr at 01/26/24 0830, 0.04 mcg/kg/min at 01/26/24 0830    nystatin (Mycostatin) 100,000 unit/gram powder, , Topical, BID, Judd Tavera DO, Given at 01/26/24 0810    nystatin (Mycostatin) cream, , Topical, BID, LILLIE Blair, Given at 01/26/24 0812    oxygen (O2) therapy, , inhalation, Continuous PRN - O2/gases, Judd Tavera DO, 4 L/min at 01/23/24 1600    pantoprazole (ProtoNix) EC tablet 40 mg, 40 mg, oral, Daily, 40 mg at 01/26/24 0810 **OR** pantoprazole (ProtoNix) injection 40 mg, 40 mg, intravenous, Daily, Yusuf De La Garza PA-C, 40 mg at 01/21/24 0824    sennosides-docusate sodium (Judy-Colace) 8.6-50 mg per tablet 1 tablet, 1 tablet, oral, Nightly PRN, Yusuf De La Garza PA-C    sodium chloride 3 % nebulizer solution 3 mL, 3 mL, nebulization, TID, Charly Grossman MD, 3 mL at 01/26/24 0820    vancomycin (Vancocin) capsule 125 mg, 125 mg, oral, Daily, Katlyn Alexander, APRN-CNP, 125 mg at 01/26/24 0908    vasopressin (Vasostrict) 0.2 unit/mL infusion, 0.03 Units/min, intravenous, Continuous, Dhiraj De La Garza MD, Last Rate: 9 mL/hr at 01/26/24 0800, 0.03  Units/min at 01/26/24 0800    zinc oxide 20 % ointment 1 Application, 1 Application, Topical, BID, Nickolas Gallego, APRN-CNP, 1 Application at 01/26/24 0810    Review of Systems:  14 point review of systems was completed and negative except for those specially mention in my HPI    Physical Exam:    Heart Rate:  []   Temp:  [35.3 °C (95.5 °F)-36.8 °C (98.2 °F)]   Resp:  [11-29]   Weight:  [87.4 kg (192 lb 10.9 oz)]   SpO2:  [87 %-100 %]     Physical Exam  Vitals and nursing note reviewed.   Constitutional:       Appearance: She is obese. She is not ill-appearing or diaphoretic.      Interventions: Nasal cannula in place.   HENT:      Head: Normocephalic and atraumatic.      Nose: Congestion present.      Mouth/Throat:      Mouth: Mucous membranes are moist.      Pharynx: Oropharynx is clear. No posterior oropharyngeal erythema.   Eyes:      General: No scleral icterus.     Extraocular Movements: Extraocular movements intact.      Conjunctiva/sclera: Conjunctivae normal.      Pupils: Pupils are equal, round, and reactive to light.   Neck:      Vascular: No carotid bruit.   Cardiovascular:      Rate and Rhythm: Normal rate and regular rhythm.      Heart sounds: No murmur heard.  Pulmonary:      Effort: Pulmonary effort is normal.      Breath sounds: No wheezing or rhonchi.   Abdominal:      General: Bowel sounds are normal.      Tenderness: There is no abdominal tenderness. There is no guarding or rebound.   Musculoskeletal:         General: Swelling present.      Right lower leg: Edema present.      Left lower leg: Edema present.   Lymphadenopathy:      Cervical: No cervical adenopathy.   Skin:     General: Skin is warm.      Capillary Refill: Capillary refill takes 2 to 3 seconds.      Coloration: Skin is not jaundiced.      Findings: Bruising, erythema, lesion and rash present.      Comments: Ulcers on bilateral heels and left shin  Ulcer on sacrum  Skin tear to Left hand  Edema to bilateral uppers but R>L       Neurological:      Mental Status: She is alert. Mental status is at baseline.      Cranial Nerves: No cranial nerve deficit.      Motor: Weakness present.   Psychiatric:         Behavior: Behavior is cooperative.         Objective:    I have reviewed all medications, laboratory results, and imaging pertinent for today's encounter.    FiO2 (%):  [28 %] 28 %      Intake/Output Summary (Last 24 hours) at 1/26/2024 0912  Last data filed at 1/26/2024 0800  Gross per 24 hour   Intake 1126.27 ml   Output 50 ml   Net 1076.27 ml         Assessment/Plan:  I am currently managing this critically ill patient for the following problems:     Septic Shock   ESRD   Chronic Hypotension   Recurrent Pleural Effusions   Vasculopathy   Multiple Wounds with and with out infections  C. Diff infection  Acute Metabolic Encephalopathy     Neuro/Psych/Pain Ctrl/Sedation:  Acute Metabolic Encephalopathy  -Pain Control: Tylenol for mild  -CAM Assessment every shift, neuro assessments q4   -Palliative consulted   -restart Lexapro and gabapentin  -CT head No acute finding   -Will continue to monitor for acute encephalitis, currently appears to be clearing with less periods of confusion        Respiratory/ENT:  Ct chest showed multiple nodules on lung in August 23   Moderate to large left sided pleural effusion   -Currently on NC 2L, patient reports breathing easier and productive cough  -Will repeat CXR in case of acute respiratory decompensation for possible re accumulation  -Thoracentesis 1/20 over 1L of hazy straw colored fluid, Lights criteria suggest Transudative    -SP02 >92%, Nasal canula titration   -CT C/A/P A left lower lobe pneumonia is identified. 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  -Renal Low phos Low K assess mental status before feeding   -Flexacil for stools   -BR with senna/colace PRN   -Protonix daily   -Slightly uptrending bilirubin with no signs of jaundice or hepatic failure, Hepatic panel scheduled for am draw        Renal/Volume Status (Intra & Extravascular):  ESRD   Metabolic lactic acidosis-resolved  -Nephrology following for dialysis needs  -Elevated Lactate 2/2 hypoperfusion -resolved  -Conservative fluid management, will trial 250ml of 5% albumin to test intravascular volume status  -Daily BMP, Replete electrolytes as indicated for ESRD    -Continue Uloric for gout prophylaxis   -Tablo today     Endocrine  DM2   -SSI Q4h     Infectious Disease:  Hx of MRSA bacteremia with endocarditis  Multiple Wounds with and with out infections  C. Diff infection  -WBC 22.9 -> 21.0  -> 21.2 -> 16.5-> 13.7 -> 12.8   -Per ID add doxycycline 100mg every 24 hours   -blood cultures No growth day 3  -Urine cultures grew Proteus, not suspected as source of sepsis especially in setting of anuresis    -ID Consulted, Appreciate ID consult  -C. Diff & MRSA PCR negative   -Vanc zosyn stopped today     Heme/Onc:  Anemia of chronic disease   -monitor for s/s anemia  -transfuse for hgb <7  -daily CBC  -Duplex upper extremity US negative for UE DVT     OBGYN/MSK:  Multiple Wounds with and with out infections  -Surgery consulted   -Wound care consulted for dressing other wounds   -Podiatry consulted  -XR Left ankle not suspicious for osteomyelitis   -XR Right ankle and tib/fib not suspicious for osteomyelitis   -padded pressure points  -ICU skin protocol     Ethics/Code Status:  Full code     :  DVT Prophylaxis: Eliquis  GI Prophylaxis: PPI  Bowel Regimen:  Senna  Diet: Renal regular  CVC: R internal Jugular  [place]: R radial  Amos: None  Restraints: None  Dispo: Will remain in ICU    This note was prepared using voice recognition software. The details of this note are correct and have been reviewed, and corrected to the best of my ability. Some grammatical areas may persist related to the Dragon software.     I have reviewed all medications, laboratory results, and imaging pertinent for today's encounter.  Plan Discussed with Dr. Grossman  Critical Care Time: 35 minutes  Critical Care Chana De La Garza PA-C

## 2024-01-26 NOTE — CARE PLAN
The patient's goals for the shift include No complications with dialysis     The clinical goals for the shift include Patient will have decreasing pressor requirements and maintain hemodynamically stable during dialysis      Problem: Pain  Goal: My pain/discomfort is manageable  Outcome: Progressing  Flowsheets (Taken 1/23/2024 1634 by Peri Martin, RN)  Resident's pain/discomfort is manageable:   Include resident/family/caregiver in decisions related to pain management   Offer non-pharmacological pain management interventions   Administer pain medication prior to activities that may trigger pain   Identify and avoid pain triggers     Problem: Safety  Goal: Patient will be injury free during hospitalization  Outcome: Progressing  Goal: I will remain free of falls  Outcome: Progressing  Flowsheets (Taken 1/21/2024 2323 by Judith Duncan RN)  Resident will remain free of falls:   Utilize fall response kit   Apply bed/chair alarms as appropriate   Assist with toileting as orderd   Maintain bed at position as ordered (chair height, low bed)   Consider camera monitoring   Assess and monitor medications that may increase fall risk   Consult with physical therapy as needed   Consider transfer to room close to nurses' station   Visual checks per facility policy   Accompany resident as ordered (ex. 1:1, stand-by assist, dayroom monitoring, 15 minute checks, line of sight)   Use gait belt for all transfers     Problem: Daily Care  Goal: Daily care needs are met  Outcome: Progressing  Flowsheets (Taken 1/23/2024 1634 by Peri Martin, RN)  Daily care needs are met:   Assess and monitor ability to perform self care and identify potential discharge needs   Assess skin integrity/risk for skin breakdown   Assist patient with activities of daily living as needed   Provide mouth care     Problem: Psychosocial Needs  Goal: Demonstrates ability to cope with hospitalization/illness  Outcome: Progressing  Flowsheets (Taken  1/23/2024 1634 by Peri Martin, FLAQUITO)  Demonstrates ability to cope with hospitalization/illness:   Encourage verbalization of feelings/concerns/expectations   Provide low-stimulation environment as needed   Assist resident to identify and practice own strengths and abilities   Encourage resident to set and complete small goals for self   Encourage participation in diversional activities   Include resident/family/caregiver in decisions related to psychosocial needs  Goal: Collaborate with me, my family, and caregiver to identify my specific goals  Outcome: Progressing     Problem: Discharge Barriers  Goal: My discharge needs are met  Outcome: Progressing  Flowsheets (Taken 1/26/2024 1306)  Resident's discharge needs are met:   Identify potential discharge barriers on admission and throughout stay   Involve resident/family/caregiver in discharge planning process     Problem: Fall/Injury  Goal: Not fall by end of shift  Outcome: Progressing  Goal: Be free from injury by end of the shift  Outcome: Progressing  Goal: Verbalize understanding of personal risk factors for fall in the hospital  Outcome: Progressing  Goal: Verbalize understanding of risk factor reduction measures to prevent injury from fall in the home  Outcome: Progressing  Goal: Pace activities to prevent fatigue by end of the shift  Outcome: Progressing     Problem: Skin  Goal: Decreased wound size/increased tissue granulation at next dressing change  Outcome: Progressing  Flowsheets (Taken 1/26/2024 1306)  Decreased wound size/increased tissue granulation at next dressing change:   Promote sleep for wound healing   Utilize specialty bed per algorithm   Protective dressings over bony prominences  Goal: Participates in plan/prevention/treatment measures  Outcome: Progressing  Flowsheets (Taken 1/26/2024 1306)  Participates in plan/prevention/treatment measures:   Discuss with provider PT/OT consult   Increase activity/out of bed for meals   Elevate  heels  Goal: Prevent/manage excess moisture  Outcome: Progressing  Flowsheets (Taken 1/26/2024 1306)  Prevent/manage excess moisture:   Cleanse incontinence/protect with barrier cream   Moisturize dry skin   Monitor for/manage infection if present   Follow provider orders for dressing changes  Goal: Prevent/minimize sheer/friction injuries  Outcome: Progressing  Flowsheets (Taken 1/26/2024 1306)  Prevent/minimize sheer/friction injuries:   Complete micro-shifts as needed if patient unable. Adjust patient position to relieve pressure points, not a full turn   HOB 30 degrees or less   Increase activity/out of bed for meals   Turn/reposition every 2 hours/use positioning/transfer devices   Use pull sheet   Utilize specialty bed per algorithm  Goal: Promote/optimize nutrition  Outcome: Progressing  Flowsheets (Taken 1/26/2024 1306)  Promote/optimize nutrition:   Assist with feeding   Offer water/supplements/favorite foods   Consume > 50% meals/supplements   Monitor/record intake including meals   Reassess MST if dietician not consulted  Goal: Promote skin healing  Outcome: Progressing  Flowsheets (Taken 1/26/2024 1306)  Promote skin healing:   Assess skin/pad under line(s)/device(s)   Ensure correct size (line/device) and apply per  instructions   Protective dressings over bony prominences   Turn/reposition every 2 hours/use positioning/transfer devices   Rotate device position/do not position patient on device     Problem: Pain  Goal: Takes deep breaths with improved pain control throughout the shift  Outcome: Progressing  Goal: Turns in bed with improved pain control throughout the shift  Outcome: Progressing  Goal: Walks with improved pain control throughout the shift  Outcome: Progressing  Goal: Performs ADL's with improved pain control throughout shift  Outcome: Progressing  Goal: Participates in PT with improved pain control throughout the shift  Outcome: Progressing  Goal: Free from opioid side  effects throughout the shift  Outcome: Progressing  Goal: Free from acute confusion related to pain meds throughout the shift  Outcome: Progressing     Problem: Respiratory  Goal: Clear secretions with interventions this shift  Outcome: Progressing  Flowsheets (Taken 1/23/2024 1634 by Peri Martin RN)  Clear secretions with interventions this shift:   Encourage/provide pulmonary hygiene/secretion clearance   Med administration/monitoring of effect  Goal: Minimize anxiety/maximize coping throughout shift  Outcome: Progressing  Flowsheets (Taken 1/23/2024 1634 by Peri Martin RN)  Minimize anxiety/maximize coping throughout shift:   Med administration/monitoring of effect   Monitor pain/anxiety level  Goal: Minimal/no exertional discomfort or dyspnea this shift  Outcome: Progressing  Flowsheets (Taken 1/26/2024 1306)  Minimal/no exertional discomfort or dyspnea this shift: Positioning to promote ventilation/comfort  Goal: No signs of respiratory distress (eg. Use of accessory muscles. Peds grunting)  Outcome: Progressing  Goal: Patent airway maintained this shift  Outcome: Progressing  Goal: Tolerate pulmonary toileting this shift  Outcome: Progressing  Flowsheets (Taken 1/26/2024 1306)  Tolerate pulmonary toileting this shift: Positioning to promote ventilation/comfort  Goal: Verbalize decreased shortness of breath this shift  Outcome: Progressing  Flowsheets (Taken 1/23/2024 1634 by Peri Martin RN)  Verbalize decreased shortness of breath this shift: Encourage/provide pulmonary hygiene/secretion clearance  Goal: Wean oxygen to maintain O2 saturation per order/standard this shift  Outcome: Progressing  Flowsheets (Taken 1/23/2024 1634 by Prei Martin RN)  Wean oxygen to maintain O2 saturation per order/standard this shift: Encourage activity/mobility  Goal: Increase self care and/or family involvement in next 24 hours  Outcome: Progressing  Flowsheets (Taken 1/26/2024 1306)  Increase self  care and/or family involvement in next 24 hours: Encourage activity/mobility     Problem: Diabetes  Goal: Maintain electrolyte levels within acceptable range throughout shift  Outcome: Progressing  Flowsheets (Taken 1/26/2024 1306)  Maintain electrolyte levels within acceptable range throughout shift: Med administration/monitoring of effect  Goal: Maintain glucose levels >70mg/dl to <250mg/dl throughout shift  Outcome: Progressing  Flowsheets (Taken 1/26/2024 1306)  Maintain glucose levels >70mg/dl to <250mg/dl throughout shift: Med administration/monitoring of effect  Goal: No changes in neurological exam by end of shift  Outcome: Progressing  Flowsheets (Taken 1/26/2024 1306)  No changes in neurological exam by end of shift: Complete frequent neurological assessments  Goal: Learn about and adhere to nutrition recommendations by end of shift  Outcome: Progressing  Flowsheets (Taken 1/26/2024 1306)  Learn about and adhere to nutrition recommendations by end of shift: Ensure/encourage compliance with appropriate diet  Goal: Vital signs within normal range for age by end of shift  Outcome: Progressing  Flowsheets (Taken 1/26/2024 1306)  Vital signs within normal range for age by end of shift: Med administration/monitoring of effect  Goal: Increase self care and/or family involovement by end of shift  Outcome: Progressing  Goal: Receive DSME education by end of shift  Outcome: Progressing  Flowsheets (Taken 1/26/2024 1306)  Receive DSME education by end of shift: Provide patient centered education on Diabetic Self Management Education     Problem: Discharge Planning  Goal: Discharge to home or other facility with appropriate resources  Outcome: Progressing  Flowsheets (Taken 1/26/2024 1306)  Discharge to home or other facility with appropriate resources:   Identify barriers to discharge with patient and caregiver   Arrange for needed discharge resources and transportation as appropriate   Identify discharge learning  needs (meds, wound care, etc)   Arrange for interpreters to assist at discharge as needed   Refer to discharge planning if patient needs post-hospital services based on physician order or complex needs related to functional status, cognitive ability or social support system     Problem: Chronic Conditions and Co-morbidities  Goal: Patient's chronic conditions and co-morbidity symptoms are monitored and maintained or improved  Outcome: Progressing  Flowsheets (Taken 1/26/2024 1306)  Care Plan - Patient's Chronic Conditions and Co-Morbidity Symptoms are Monitored and Maintained or Improved:   Monitor and assess patient's chronic conditions and comorbid symptoms for stability, deterioration, or improvement   Collaborate with multidisciplinary team to address chronic and comorbid conditions and prevent exacerbation or deterioration   Update acute care plan with appropriate goals if chronic or comorbid symptoms are exacerbated and prevent overall improvement and discharge

## 2024-01-26 NOTE — PROGRESS NOTES
"Nutrition Follow up Note    Nutrition Assessment      Spoke with RN. RN stated that pt is not eating well and is not taking supplements. Will provide Magic cup TID instead of Mighty shake to see if pt will take those.     Nutrition History:  Food and Nutrient History: Per RN, pt is not eating very well and is not drinking her supplements.  Energy Intake: Poor < 50 %      Anthropometrics:  Ht: 157.5 cm (5' 2\"), Wt: 87.4 kg (192 lb 10.9 oz), BMI: 35.23  IBW/kg (Dietitian Calculated): 50 kg  Percent of IBW: 151.6 %  Adjusted Body Weight (kg): 56.36 kg    Weight Change:  Weight History / % Weight Change: Per chart, Pt has gained 32# (20%) over 3 weeks, question accuracy.           Daily Weight  01/26/24 : 87.4 kg (192 lb 10.9 oz)  01/05/24 : 72.8 kg (160 lb 7.9 oz)  11/19/23 : 72.6 kg (160 lb)  01/19/23 : 80.2 kg (176 lb 12.9 oz)  11/07/22 : 86.2 kg (190 lb)  07/21/22 : 80.2 kg (176 lb 12.9 oz)  05/26/22 : 77.1 kg (170 lb)  04/04/22 : 86.2 kg (190 lb)  06/29/21 : 87.9 kg (193 lb 12.6 oz)  05/11/21 : 81.6 kg (180 lb)      Nutrition Focused Physical Exam Findings:             Edema  Edema: +2 mild  Edema Location: BLE, BUE         Nutrition Significant Labs:  Lab Results   Component Value Date    WBC 12.8 (H) 01/26/2024    HGB 9.6 (L) 01/26/2024    HCT 29.8 (L) 01/26/2024     01/26/2024    CHOL 104 (L) 07/01/2023    TRIG 155 (H) 07/01/2023    HDL 30 (L) 07/01/2023    ALT 9 01/26/2024    AST 26 01/26/2024     (L) 01/26/2024    K 3.7 01/26/2024    CL 92 (L) 01/26/2024    CREATININE 2.60 (H) 01/26/2024    BUN 11 01/26/2024    CO2 23 (L) 01/26/2024    TSH 6.10 (H) 01/24/2024    INR 1.3 (H) 01/19/2024    HGBA1C 8.8 (H) 06/25/2023    ALBUR 1,315 (H) 02/24/2019       Current Facility-Administered Medications:     acetaminophen (Tylenol) tablet 650 mg, 650 mg, oral, TID, Yusuf De La Garza PA-C, 650 mg at 01/26/24 1404    apixaban (Eliquis) tablet 2.5 mg, 2.5 mg, oral, BID, Judd Tavera DO, 2.5 mg at 01/26/24 " 0810    dextrose 10 % in water (D10W) infusion, 0.3 g/kg/hr, intravenous, Once PRN, Judd Tavera DO    dextrose 50 % injection 25 g, 25 g, intravenous, q15 min PRN, Judd Tavera DO    doxycycline (Vibramycin) capsule 100 mg, 100 mg, oral, Daily, Yusuf De La Garza PA-C, 100 mg at 01/26/24 1404    escitalopram (Lexapro) tablet 10 mg, 10 mg, oral, Nightly, Judd Tavera DO, 10 mg at 01/25/24 2009    gabapentin (Neurontin) capsule 100 mg, 100 mg, oral, BID, Judd Tavera DO, 100 mg at 01/26/24 0810    glucagon (Glucagen) injection 1 mg, 1 mg, intramuscular, q15 min PRN, Judd Tavera DO    heparin 1,000 unit/mL injection 2,000 Units, 2,000 Units, intra-catheter, After Dialysis, Saeed Mondragon MD, 1,600 Units at 01/24/24 1631    heparin 1,000 unit/mL injection 2,000 Units, 2,000 Units, intra-catheter, After Dialysis, Saeed Mondragon MD, 1,600 Units at 01/24/24 1624    heparin 1,000 unit/mL injection 2,000 Units, 2,000 Units, intra-catheter, After Dialysis, Saeed Mondragon MD    heparin 1,000 unit/mL injection 2,000 Units, 2,000 Units, intra-catheter, After Dialysis, Saeed Mondragon MD    insulin lispro (HumaLOG) injection 0-10 Units, 0-10 Units, subcutaneous, TID with meals, Judd Tavera DO, 2 Units at 01/26/24 0809    ipratropium-albuteroL (Duo-Neb) 0.5-2.5 mg/3 mL nebulizer solution 3 mL, 3 mL, nebulization, q8h, Annika Zavaleta, APRN-CNP, 3 mL at 01/26/24 0801    midodrine (Proamatine) tablet 15 mg, 15 mg, oral, TID with meals, Judd Tavera DO, 15 mg at 01/26/24 1202    norepinephrine (Levophed) 8 mg in dextrose 5% 250 mL (0.032 mg/mL) infusion (premix), 0.01-3 mcg/kg/min, intravenous, Continuous, Yusuf De La Garza PA-C, Last Rate: 6.97 mL/hr at 01/26/24 1400, 0.05 mcg/kg/min at 01/26/24 1400    nystatin (Mycostatin) 100,000 unit/gram powder, , Topical, BID, Judd Tavera DO, Given at 01/26/24 0810    nystatin (Mycostatin) cream, , Topical, BID,  MADHURI Blair-CNP, Given at 01/26/24 0812    oxygen (O2) therapy, , inhalation, Continuous PRN - O2/gases, Judd Tavera DO, 4 L/min at 01/23/24 1600    pantoprazole (ProtoNix) EC tablet 40 mg, 40 mg, oral, Daily, 40 mg at 01/26/24 0810 **OR** pantoprazole (ProtoNix) injection 40 mg, 40 mg, intravenous, Daily, Yusuf De La Garza PA-C, 40 mg at 01/21/24 0824    sennosides-docusate sodium (Judy-Colace) 8.6-50 mg per tablet 1 tablet, 1 tablet, oral, Nightly PRN, Yusuf De La Garza PA-C    sodium chloride 3 % nebulizer solution 3 mL, 3 mL, nebulization, TID, Charly Grossman MD, 3 mL at 01/26/24 0820    vancomycin (Vancocin) capsule 125 mg, 125 mg, oral, Daily, MADHURI Christiansen-CNP, 125 mg at 01/26/24 0908    vasopressin (Vasostrict) 0.2 unit/mL infusion, 0.03 Units/min, intravenous, Continuous, Dhiraj De La Garza MD, Stopped at 01/26/24 1315    zinc oxide 20 % ointment 1 Application, 1 Application, Topical, BID, MADHURI Blair-CNP, 1 Application at 01/26/24 0810    Dietary Orders (From admission, onward)       Start     Ordered    01/25/24 1700  Adult diet Regular; Thin 0  Diet effective now        Question Answer Comment   Diet type Regular    Fluid consistency Thin 0        01/25/24 1659    01/22/24 1505  Oral nutritional supplements  Until discontinued        Comments: Any flavor   Question Answer Comment   Deliver with Breakfast    Deliver with Dinner    Select supplement: Christian        01/22/24 1504    01/22/24 1503  Oral nutritional supplements  Until discontinued        Comments: Any flavor   Question Answer Comment   Deliver with All meals    Select supplement: Sugar Free Mighty Shake        01/22/24 1503    01/22/24 1353  Oral nutritional supplements  Until discontinued        Question Answer Comment   Deliver with Breakfast    Deliver with Dinner    Select supplement: Christian        01/22/24 1353                  Estimated Needs:   Estimated Energy Needs  Total Energy Estimated Needs (kCal):   (4242-9260)  Total Estimated Energy Need per Day (kCal/kg):  (30-35)  Method for Estimating Needs: ABW    Estimated Protein Needs  Total Protein Estimated Needs (g):  (68-85)  Total Protein Estimated Needs (g/kg):  (1.2-1.5)  Method for Estimating Needs: ABW    Estimated Fluid Needs  Total Fluid Estimated Needs (mL):  (UO + 500 mL)  Method for Estimating Needs: HD and multiple wounds        Nutrition Diagnosis   Nutrition Diagnosis:       Nutrition Diagnosis  Patient has Nutrition Diagnosis: Yes  Diagnosis Status (1): Ongoing  Nutrition Diagnosis 1: Increased nutrient needs  Related to (1): Increased demand for nutrient  As Evidenced by (1): HD       Nutrition Interventions/Recommendations   Nutrition Interventions and Recommendations:    Nutrition Prescription:  Individualized Nutrition Prescription Provided for : 1692-1974 kcals, 68-85 gm protein via Renal diet    Nutrition Interventions:   Food and/or Nutrient Delivery Interventions  Interventions: Meals and snacks, Medical food supplement  Meals and Snacks: General healthful diet  Goal: Provide diet as ordered  Medical Food Supplement: Commercial beverage  Goal: Will provide Magic cup TID to increase PO intake and Christian BID to aid in wound healing. Magic cup provides 290 kcals, 9 gm protein per serving. Christian provides 90 kcals, 2.5 gm protein per serving.    Education Documentation  No documentation found.           Nutrition Monitoring and Evaluation   Monitoring/Evaluation:   Food/Nutrient Related History Monitoring  Monitoring and Evaluation Plan: Energy intake  Energy Intake: Estimated energy intake  Criteria: Pt to consume >/= 75% of estimated needs         Time Spent/Follow-up:   Follow Up  Time Spent (min): 20 minutes  Last Date of Nutrition Visit: 01/26/24  Nutrition Follow-Up Needed?: 5-7 days  Follow up Comment: 1/31/24

## 2024-01-26 NOTE — PROGRESS NOTES
Patient not medically clear. Patient remains in the ICU. Patient remains on Levo and Vaso. Patient receiving Tablo today. At this time there is not a safe discharge plan in place. Patient admitted from [facility]. Patient's family thinking that they want patient to go to [facility] at discharge. Referral has not been sent. Patient will need therapy evaluation. Will follow.      **PATIENT DOES NOT HAVE A SAFE DISCHARGE PLAN      Shauna Jensen RN

## 2024-01-26 NOTE — PROGRESS NOTES
"Ayanna Gross is a 73 y.o. female on day 7 of admission presenting with Septic shock (CMS/HCC).    Subjective   On Levo and Vaso. Looks like she had a low H&H on this morning's blood work. Seems to be dilutional as they were re-drawn with levels consistent with yesterday.     Objective     Physical Exam  Constitutional:       Appearance: She is ill-appearing.   HENT:      Head: Normocephalic and atraumatic.   Cardiovascular:      Rate and Rhythm: Normal rate.   Pulmonary:      Effort: Pulmonary effort is normal.   Abdominal:      General: Abdomen is flat. Bowel sounds are normal.   Skin:     General: Skin is warm and dry.      Findings: Rash present.      Comments: Heel wounds with eschar. Sacral rash red and present to b/l buttocks and sacrum.    Neurological:      Mental Status: She is alert. She is disoriented.      Motor: Weakness present.       Last Recorded Vitals  Blood pressure 85/65, pulse 95, temperature 36.9 °C (98.4 °F), temperature source Temporal, resp. rate 17, height 1.575 m (5' 2\"), weight 87.4 kg (192 lb 10.9 oz), SpO2 96 %.  Intake/Output last 3 Shifts:  I/O last 3 completed shifts:  In: 891.8 (11.2 mL/kg) [P.O.:610; I.V.:31.8 (0.4 mL/kg); Blood:250]  Out: 300 (3.8 mL/kg) [Stool:300]  Weight: 79.5 kg     Relevant Results  Lab Results   Component Value Date    WBC 12.8 (H) 01/26/2024    HGB 9.6 (L) 01/26/2024    HCT 29.8 (L) 01/26/2024    MCV 92 01/26/2024     01/26/2024     Lab Results   Component Value Date    GLUCOSE 158 (H) 01/26/2024    CALCIUM 8.8 01/26/2024     (L) 01/26/2024    K 3.7 01/26/2024    CO2 23 (L) 01/26/2024    CL 92 (L) 01/26/2024    BUN 11 01/26/2024    CREATININE 2.60 (H) 01/26/2024       Assessment/Plan   Principal Problem:    Septic shock (CMS/HCC)  Active Problems:    Pneumonia    Low blood pressure    End stage renal disease (CMS/HCC)    Anemia due to blood loss, acute  1/25: Continue with Nystatin and Zinc paste to sacrum. Per podiatry note, her heel wounds " are stable and should continue with betadine and dry dressings. Should the wound become unstable, she may be able to have debridement. Will continue to follow for wound care.     1/24: Continue with nystatin and zinc paste to sacral area. Vascular saw patient and she is not a candidate for surgical intervention to heel wounds due to high dose pressor requirement. I do not see any recs from podiatry; however, per wound care RN note, there was a possibility of debridement today. I have reached out to podiatry. For now, continue to paint with betadine and cover with kerlex.    1/23: Moisture associated sacral rash washed with soap and water and a combination of triad and nystatin powder applied. Podiatry is currently present at the bedside, awaiting recs for heel wounds. Will follow for wound care.     1/22:Heel wounds to be evaluated by podiatry for possible debridement. For sacral dermatitis, clean with soap and water, dry completely and apply mixture of zinc oxide and nystatin.        I spent 15 minutes in the professional and overall care of this patient.      Nakita June, MADHURI-CNP

## 2024-01-27 NOTE — PROGRESS NOTES
Ayanna Gross is a 73 y.o. female on day 8 of admission presenting with Septic shock (CMS/HCC).      Subjective   No new overnight events.  On pressors.  Denies dyspnea at rest.       Scheduled medications  acetaminophen, 650 mg, oral, TID  apixaban, 2.5 mg, oral, BID  doxycylcine, 100 mg, oral, Daily  escitalopram, 10 mg, oral, Nightly  gabapentin, 100 mg, oral, BID  heparin, 2,000 Units, intra-catheter, After Dialysis  heparin, 2,000 Units, intra-catheter, After Dialysis  heparin, 2,000 Units, intra-catheter, After Dialysis  heparin, 2,000 Units, intra-catheter, After Dialysis  insulin lispro, 0-10 Units, subcutaneous, TID with meals  ipratropium-albuteroL, 3 mL, nebulization, q8h  midodrine, 15 mg, oral, TID with meals  nystatin, , Topical, BID  nystatin, , Topical, BID  pantoprazole, 40 mg, oral, Daily   Or  pantoprazole, 40 mg, intravenous, Daily  sodium chloride, 3 mL, nebulization, TID  vancomycin, 125 mg, oral, Daily  zinc oxide, 1 Application, Topical, BID      Continuous medications  norepinephrine, 0.01-3 mcg/kg/min, Last Rate: 0.05 mcg/kg/min (01/27/24 1345)  vasopressin, 0.03 Units/min, Last Rate: Stopped (01/26/24 1315)      PRN medications  PRN medications: dextrose 10 % in water (D10W), dextrose, glucagon, oxygen, sennosides-docusate sodium      Objective      Vitals 24HR  Heart Rate:  []   Temp:  [36.3 °C (97.3 °F)-36.4 °C (97.6 °F)]   Resp:  [10-29]   Weight:  [87.4 kg (192 lb 10.9 oz)]   SpO2:  [86 %-100 %]     General: elderly woman, not in distress  Access: R internal jugular TDC  Lungs: clear anteriorly  Heart: S1 and S2, regular  Abdomen: soft, non tender  Extremities: dressing over both LE    Intake/Output last 3 Shifts:    Intake/Output Summary (Last 24 hours) at 1/27/2024 1350  Last data filed at 1/27/2024 1300  Gross per 24 hour   Intake 757.24 ml   Output 1850 ml   Net -1092.76 ml       Relevant Results    Results for orders placed or performed during the hospital encounter of  01/19/24 (from the past 24 hour(s))   POCT GLUCOSE   Result Value Ref Range    POCT Glucose 131 (H) 74 - 99 mg/dL   POCT GLUCOSE   Result Value Ref Range    POCT Glucose 130 (H) 74 - 99 mg/dL   Comprehensive metabolic panel   Result Value Ref Range    Glucose 134 (H) 65 - 99 mg/dL    Sodium 134 133 - 145 mmol/L    Potassium 3.5 3.4 - 5.1 mmol/L    Chloride 98 97 - 107 mmol/L    Bicarbonate 23 (L) 24 - 31 mmol/L    Urea Nitrogen 7 (L) 8 - 25 mg/dL    Creatinine 1.90 (H) 0.40 - 1.60 mg/dL    eGFR 28 (L) >60 mL/min/1.73m*2    Calcium 8.7 8.5 - 10.4 mg/dL    Albumin 3.1 (L) 3.5 - 5.0 g/dL    Alkaline Phosphatase 162 (H) 35 - 125 U/L    Total Protein 5.1 (L) 5.9 - 7.9 g/dL    AST 29 5 - 40 U/L    Bilirubin, Total 1.8 (H) 0.1 - 1.2 mg/dL    ALT 13 5 - 40 U/L    Anion Gap 13 <=19 mmol/L   Magnesium   Result Value Ref Range    Magnesium 1.90 1.60 - 3.10 mg/dL   CBC   Result Value Ref Range    WBC 12.0 (H) 4.4 - 11.3 x10*3/uL    nRBC 0.0 0.0 - 0.0 /100 WBCs    RBC 3.27 (L) 4.00 - 5.20 x10*6/uL    Hemoglobin 9.5 (L) 12.0 - 16.0 g/dL    Hematocrit 29.7 (L) 36.0 - 46.0 %    MCV 91 80 - 100 fL    MCH 29.1 26.0 - 34.0 pg    MCHC 32.0 32.0 - 36.0 g/dL    RDW 25.0 (H) 11.5 - 14.5 %    Platelets 137 (L) 150 - 450 x10*3/uL   Phosphorus   Result Value Ref Range    Phosphorus 2.0 (L) 2.5 - 4.5 mg/dL   POCT GLUCOSE   Result Value Ref Range    POCT Glucose 146 (H) 74 - 99 mg/dL   POCT GLUCOSE   Result Value Ref Range    POCT Glucose 151 (H) 74 - 99 mg/dL       Assessment/Plan   ESRD on HD  Hypotension  Edema  Hyponatremia  Anemia in CKD, at goal    Dialyzed yesterday and will have next IHD on Monday.    Defer to the intensivist re: hemodynamic support.    Continue other care.       Raj Mcgee MD

## 2024-01-27 NOTE — SIGNIFICANT EVENT
"  Ayanna Gross  1950  [id number]  01/27/24    Today at 1650 I had an extensive 20-minute conversation with patient Ayanna Gross.  Present during this conversation was a nurse Elzbieta Alejandro RN. Patient's orientation was tested by asking where patient was located, what year it was, how her  was doing, and if her  was discharged from the hospital. Additional questions included visitors today which she answered appropriately. Patient was then explained her prognosis and current treatment. She understood her situation and condition by verbal check back with consequences of not receiving current treatment. Patient was able to explain why she needed dialysis and what would happen if she did not obtain dialysis. Patient understood that she is on a vasoactive medication that elevates her blood pressure artificially and she would not be able to go home while on these medications. I personally asked the patient what was her goal of care at this time, and patient explained \"I want to get better\".  Nurse Elzbieta Alejandro and I then asked patient that if life-threatening measures such as CPR and intubation were needed would patient want to receive these measures? Patient responded that yes to stay alive she would.     Capacity Assessment Tool    \"Capacity\" is the \"ability\" to make a decision.  The decision in question must be specific (one decision), relevant to a patient's current condition (appropriate), and timely (neither prospective nor retrospective).    Capacity varies based on knowledge base (explanation/understanding of clinical information), cognitive processing, acute psychiatric illness, and other clinical conditions.    In order to be deemed \"capacitated\" to make a single decision at one point in time, a patient must demonstrate all 4 of the following elements:    *Ability to consistently communicate a choice (consistent over time with adequate information)  *Ability to understand the relevant " "information (accurate knowledge of condition)  *Ability to appreciate the situation and its consequences (risks/benefits, pros/cons)  *Ability to reason about treatment options (without undue influence of a person or condition, eg. suicidality or acute psychosis)      Current Decision    Clinical issue:   Vasoactive agents needed to maintain systolic blood pressure of 70  Multiple wounds/ulcers of different stage with high risk of infection    Did the appropriate team address relevant information with the patient:  Yes    Date: 1/27/24    Capacity Evaluation    Patient demonstrates ability to consistently communicate choice:  Yes patient explained that she would like to live and get better understanding that would mean being discharged to a nursing home and not her home.     Patient demonstrates ability to understand the relevant information:  Yes patient was able to explain that she would die without receiving dialysis and that she would die if her infections were not treated.     Patient demonstrates ability to appreciate the situation and its consequences:  Yes the patient was able to appreciate the situation and the its consequences which were that if she did not receive cardiopulmonary resuscitation and/or intubation during cardiac arrest that she would die. Patient understood the risk, the benefit, and the pros vs the cons.     Patient demonstrates ability to reason about treatment options:  Yes the patient was able reason with the options of hospice, palliative, and aggressive therapy.     If ANY of the above items are answered \"NO,\" the patient LACKS CAPACITY for that specific decision at hand, at that specific time.  Further capacity evaluations can be done as needed.      This note was prepared using voice recognition software. The details of this note are correct and have been reviewed, and corrected to the best of my ability. Some grammatical areas may persist related to the Dragon software.     Critical " [facility]   Yusuf De La Garza PA-C

## 2024-01-27 NOTE — PROGRESS NOTES
Ayanna Gross is a 73 y.o. female on day 8 of admission presenting with Septic shock (CMS/HCC).    Subjective   Interval History:   Awake, alert  No reported diarrhea  Afebrile  Remains on pressors        Review of Systems   All other systems reviewed and are negative.      Objective   Range of Vitals (last 24 hours)  Heart Rate:  []   Temp:  [36.3 °C (97.3 °F)-36.9 °C (98.4 °F)]   Resp:  [10-29]   SpO2:  [87 %-100 %]   Daily Weight  01/26/24 : 87.4 kg (192 lb 10.9 oz)    Body mass index is 35.24 kg/m².    Physical Exam  Constitutional:       Appearance: Normal appearance.   HENT:      Head: Normocephalic and atraumatic.      Nose: Nose normal.   Eyes:      Extraocular Movements: Extraocular movements intact.      Conjunctiva/sclera: Conjunctivae normal.   Cardiovascular:      Heart sounds: Normal heart sounds, S1 normal and S2 normal.   Pulmonary:      Breath sounds: Decreased breath sounds present.   Abdominal:      General: Bowel sounds are normal.      Palpations: Abdomen is soft.   Musculoskeletal:      Cervical back: Normal range of motion and neck supple.   Skin:     Comments: Stage III sacral ulcer, bilateral posterior heel eschar -photos examined  Neurological:      Mental Status: She is alert.     Antibiotics  aspirin tablet 325 mg  acetaminophen (Tylenol) tablet 650 mg  cefepime (Maxipime) 1 g in dextrose 5 % 50 mL IV  sodium chloride 0.9 % bolus 2,229 mL  apixaban (Eliquis) tablet 2.5 mg  escitalopram (Lexapro) tablet 10 mg  febuxostat (Uloric) tablet 40 mg  fenofibrate (Triglide) tablet 160 mg  gabapentin (Neurontin) capsule 100 mg  midodrine (Proamatine) tablet 15 mg  nystatin (Mycostatin) 100,000 unit/gram powder  oxyCODONE-acetaminophen (Percocet) 5-325 mg per tablet 1 tablet  simvastatin (Zocor) tablet 20 mg  piperacillin-tazobactam-dextrose (Zosyn) IV 2.25 g  vancomycin (Vancocin) capsule 125 mg  oxygen (O2) therapy  dextrose 50 % injection 25 g  glucagon (Glucagen) injection 1 mg  dextrose  10 % in water (D10W) infusion  insulin lispro (HumaLOG) injection 0-10 Units  nystatin (Mycostatin) 100,000 unit/gram powder 1 Application  vancomycin-diluent combo no.1 (Xellia) IVPB 1,750 mg  piperacillin-tazobactam-dextrose (Zosyn) IV 2.25 g  sodium chloride 0.9 % bolus 500 mL  norepinephrine (Levophed) 8 mg in dextrose 5% 250 mL (0.032 mg/mL) infusion (premix)  vancomycin (Vancocin) placeholder  pantoprazole (ProtoNix) EC tablet 40 mg  pantoprazole (ProtoNix) injection 40 mg  sennosides-docusate sodium (Judy-Colace) 8.6-50 mg per tablet 1 tablet  doxycycline (Vibramycin) in dextrose 5 % in water (D5W) 100 mL  mg  LORazepam (Ativan) injection 2 mg  magnesium sulfate IV 2 g  zinc oxide 20 % ointment 1 Application  nystatin (Mycostatin) cream  norepinephrine (Levophed) 8 mg in dextrose 5% 250 mL (0.032 mg/mL) infusion (premix)  heparin 1,000 unit/mL injection 2,000 Units  heparin 1,000 unit/mL injection 2,000 Units  nystatin (Mycostatin) ointment  acetaminophen (Tylenol) oral liquid 1,000 mg  albumin human 25 % solution 12.5 g  perflutren lipid microspheres (Definity) injection 0.5-10 mL of dilution  sulfur hexafluoride microsphr (Lumason) injection 24.28 mg  perflutren protein A microsphere (Optison) injection 0.5 mL  ipratropium-albuteroL (Duo-Neb) 0.5-2.5 mg/3 mL nebulizer solution 3 mL  sodium chloride 3 % nebulizer solution 3 mL  vancomycin-diluent combo no.1 (Xellia) IVPB 750 mg  sennosides-docusate sodium (Judy-Colace) 8.6-50 mg per tablet 1 tablet  acetaminophen (Tylenol) tablet 650 mg  doxycycline (Vibramycin) capsule 100 mg  magnesium oxide (Mag-Ox) tablet 400 mg  vasopressin (Vasostrict) 0.2 unit/mL infusion  norepinephrine (Levophed) 8 mg in dextrose 5% 250 mL (0.032 mg/mL) infusion (premix)  heparin 1,000 unit/mL injection 2,000 Units  heparin 1,000 unit/mL injection 2,000 Units  albumin human 25 % solution 12.5 g  vancomycin (Xellia) 1 g in 200 mL (Xellia) IVPB 1 g  ipratropium-albuteroL  (Duo-Neb) 0.5-2.5 mg/3 mL nebulizer solution 3 mL  sodium chloride 3 % nebulizer solution 3 mL  albumin human 5 % infusion 12.5 g  heparin 1,000 unit/mL injection 2,000 Units  heparin 1,000 unit/mL injection 2,000 Units  vancomycin (Vancocin) capsule 125 mg  albumin human 25 % solution 12.5 g      Relevant Results  Labs  Results from last 72 hours   Lab Units 01/27/24  0505 01/26/24  0358 01/25/24  0340   WBC AUTO x10*3/uL 12.0* 12.8* 13.7*   HEMOGLOBIN g/dL 9.5* 9.6* 9.6*   HEMATOCRIT % 29.7* 29.8* 28.9*   PLATELETS AUTO x10*3/uL 137* 151 150     Results from last 72 hours   Lab Units 01/27/24  0505 01/26/24  0358 01/25/24  0340   SODIUM mmol/L 134 130* 129*   POTASSIUM mmol/L 3.5 3.7 3.6   CHLORIDE mmol/L 98 92* 92*   CO2 mmol/L 23* 23* 24   BUN mg/dL 7* 11 9   CREATININE mg/dL 1.90* 2.60* 2.00*   GLUCOSE mg/dL 134* 158* 164*   CALCIUM mg/dL 8.7 8.8 8.5   ANION GAP mmol/L 13 15 13   EGFR mL/min/1.73m*2 28* 19* 26*   PHOSPHORUS mg/dL 2.0* 2.6 2.1*     Results from last 72 hours   Lab Units 01/27/24  0505 01/26/24  0358 01/25/24  0340 01/25/24  0230   ALK PHOS U/L 162* 120 92 65   BILIRUBIN TOTAL mg/dL 1.8* 1.5* 1.9* 1.5*   BILIRUBIN DIRECT mg/dL  --   --  1.4* 1.1*   PROTEIN TOTAL g/dL 5.1* 5.1* 4.9* 3.6*   ALT U/L 13 9 10 5   AST U/L 29 26 23 15   ALBUMIN g/dL 3.1* 2.8* 2.9* 2.2*     Estimated Creatinine Clearance: 27.9 mL/min (A) (by C-G formula based on SCr of 1.9 mg/dL (H)).  C-Reactive Protein   Date Value Ref Range Status   12/25/2023 5.20 (H) 0.00 - 2.00 mg/dL Final     CRP   Date Value Ref Range Status   06/23/2023 19.9 (H) 0 - 2.0 MG/DL Final     Comment:     Performed at [address] [address]   05/22/2022 1.0 0 - 2.0 MG/DL Final     Comment:     Performed at [address] [address]     Microbiology  Susceptibility data from last 14 days.  Collected Specimen Info Organism Ampicillin Cefazolin Cefazolin (uncomplicated UTIs only) Ciprofloxacin Gentamicin  Piperacillin/Tazobactam Tetracycline Trimethoprim/Sulfamethoxazole   01/19/24 Urine from Indwelling (Amos) Catheter Proteus mirabilis S S S S S S R S   Reviewed  Imaging  Upper extremity venous duplex bilateral    Result Date: 1/25/2024           [place] [id number]            Phone [phone]  Vascular Lab Report  [facility] US UPPER EXTREMITY VENOUS DUPLEX BILATERAL Patient Name:      BASIA Gray Physician:  [id number] Mini Busby MD, RPVI Study Date:        1/25/2024           Ordering Provider:  [id number] ERI BERMAN MRN/PID:           [id number]            Fellow: Accession#:        [id number]        Technologist:       Dara Parr RVRICHIE Date of Birth/Age: 1950 / 73 years Technologist 2: Gender:            F                   Encounter#:         [id number] Admission Status:  Inpatient           Location Performed: [facility]  Diagnosis/ICD: Right arm swelling-M79.89; Left arm swelling-M79.89 CPT Codes:     33778 Peripheral venous duplex scan for DVT complete  CONCLUSIONS:  Right Upper Venous: No evidence of acute deep vein thrombus visualized in the right upper extremity. Internal jugular vein was visualized in segments due to IV lines and bandages. Left Upper Venous: No evidence of acute deep vein thrombus visualized in the left upper extremity. Subclavian stent is noted and appears patent. There is a known occluded dialysis access noted.  Additional Findings: Technically difficult exam due to IV lines, bandages, and patient's positioning.  Imaging & Doppler Findings:  Right               Compressible Thrombus        Flow Internal Jugular        Yes        None   Spontaneous/Phasic Subclavian              Yes        None Subclavian Proximal     Yes        None   Spontaneous/Phasic Subclavian Mid          Yes        None Subclavian Distal       Yes        None   Spontaneous/Phasic  Axillary                Yes        None       Pulsatile Brachial                Yes        None Cephalic                Yes        None Basilic                 Yes        None  Left                Compress Thrombus        Flow Internal Jugular      Yes      None       Pulsatile Subclavian            Yes      None Subclavian Proximal   Yes      None       Pulsatile Subclavian Mid        Yes      None       Pulsatile Subclavian Distal     Yes      None       Pulsatile Axillary              Yes      None   Spontaneous/Phasic Brachial              Yes      None Cephalic              Yes      None Basilic               Yes      None  [id number] Mini Busby MD, RPVI Electronically signed by [id number] Mini Busby MD, ALICE on 1/25/2024 at 4:06:13 PM  ** Final **     ECG 12 lead    Result Date: 1/24/2024  Sinus tachycardia  with 1st degree AV block Right bundle branch block Possible Lateral infarct , age undetermined Abnormal ECG Confirmed by Yesika Nj ([id number]) on 1/24/2024 6:54:45 AM    XR tibia fibula right 2 views    Result Date: 1/22/2024  Interpreted By:  Real Mendieta, STUDY: XR TIBIA FIBULA RIGHT 2 VIEWS; 1/22/2024 2:15 pm   INDICATION: Signs/Symptoms:evaluate source of infection, osteomyelitis;   COMPARISON: None available.   ACCESSION NUMBER(S): [id number]   ORDERING CLINICIAN: BAN GIFFORD   TECHNIQUE: Views: AP and Lateral of the right tibia and fibula   FINDINGS: RESULT: There is no evidence for fracture or dislocation. Tricompartmental degenerative changes of the right knee. The tibia and fibula appear intact without bony erosion or evidence for osteomyelitis. No other bony or soft tissue abnormality is identified. No subcutaneous gas is noted.       No evidence for acute osseous abnormality. No bony erosion to suggest osteomyelitis.   Signed by: Real Mendieta 1/22/2024 3:15 PM Dictation workstation:   [id number]    XR ankle right 3+ views    Result Date: 1/22/2024  Interpreted By:  Real Mendieta,  STUDY: XR ANKLE RIGHT 3+ VIEWS; 1/22/2024 2:15 pm   INDICATION: Signs/Symptoms:Evuluate source of infection, osteomyelitis;   COMPARISON: None available.   ACCESSION NUMBER(S): [id number]   ORDERING CLINICIAN: BAN GIFFORD   TECHNIQUE: Views: AP, Lateral, Oblique, right ankle   FINDINGS: RESULT: There is no evidence for fracture or dislocation. The ankle mortise is intact. Joint spaces appear adequately maintained. No bony erosion to suggest osteomyelitis. No bone lesion or soft tissue abnormality is identified. No subcutaneous gas is seen.       No evidence for acute osseous abnormality. No bony erosion to suggest osteomyelitis.   Signed by: Real Mendieta 1/22/2024 3:14 PM Dictation workstation:   [id number]    Transthoracic Echo (TTE) Complete    Result Date: 1/22/2024           [place] [address]            Phone [phone] TRANSTHORACIC ECHOCARDIOGRAM REPORT  Patient Name:      BASIA Gray Physician:   [id number] [facility]  Study Date:        1/22/2024           Ordering Provider:   [id number] ERI BERMAN MRN/PID:           [id number]            Fellow: Accession#:        [id number]        Nurse: Date of Birth/Age: 1950 / 73 years Sonographer:         Tabatha Page RDCS Gender:            F                   Additional Staff: Height:            157.00 cm           Admit Date: Weight:            75.00 kg            Admission Status:    Inpatient - Routine BSA:               1.76 m2             Department Location: [facility] Blood Pressure: 88 /49 mmHg Study Type:    TRANSTHORACIC ECHO (TTE) COMPLETE Diagnosis/ICD: Chronic combined systolic (congestive) and diastolic (congestive)                heart failure (CHF)-I50.42; Sepsis, unspecified organism-A41.9 Indication:    heart failure,septic shock CPT Codes:     Echo Complete w Full Doppler-95512 Patient History: BMI:                Obese >30 Pertinent History: Sepsis, PVD, A-Fib, CVA, Cancer and HTN. breast                    prosthesis/ca, ESRD,anemia,septic shock,heart failure. Study Detail: The following Echo studies were performed: 2D, M-Mode, Doppler and               color flow. Technically challenging study due to prosthesis,               prominent lung artifact and body habitus.  PHYSICIAN INTERPRETATION: Left Ventricle: Left ventricular systolic function is normal, with an estimated ejection fraction of 70%. There are no regional wall motion abnormalities. The left ventricular cavity size is normal. Left ventricular diastolic filling was indeterminate. Left Atrium: The left atrium is moderately dilated. Right Ventricle: The right ventricle is normal in size. There is normal right ventricular global systolic function. Right Atrium: The right atrium is normal in size. Aortic Valve: The aortic valve is trileaflet. There is no evidence of aortic valve regurgitation. The peak instantaneous gradient of the aortic valve is 2.8 mmHg. Mitral Valve: The mitral valve is normal in structure. There is no evidence of mitral valve regurgitation. Tricuspid Valve: The tricuspid valve is structurally normal. There is trace tricuspid regurgitation. Pulmonic Valve: The pulmonic valve is not well visualized. The pulmonic valve regurgitation was not well visualized. Pericardium: There is no pericardial effusion noted. Aorta: The aortic root is normal.  CONCLUSIONS:  1. Left ventricular systolic function is normal with a 70% estimated ejection fraction.  2. The left atrium is moderately dilated.  3. Left ventricular diastolic filling indeterminate. QUANTITATIVE DATA SUMMARY: 2D MEASUREMENTS:                          Normal Ranges: LAs:           4.50 cm   (2.7-4.0cm) IVSd:          0.61 cm   (0.6-1.1cm) LVPWd:         0.88 cm   (0.6-1.1cm) LVIDd:         3.66 cm   (3.9-5.9cm) LV Mass Index: 41.9 g/m2 LV SYSTOLIC FUNCTION BY 2D PLANIMETRY (MOD):                      Normal Ranges: EF-A4C View: 68.3 % (>=55%) LV DIASTOLIC FUNCTION:                     Normal Ranges: MV Peak E: 1.19 m/s (0.7-1.2 m/s) MV Peak A: 0.90 m/s (0.42-0.7 m/s) E/A Ratio: 1.32     (1.0-2.2) MITRAL VALVE:                 Normal Ranges: MV DT: 192 msec (150-240msec) AORTIC VALVE:                         Normal Ranges: AoV Vmax:      0.84 m/s (<=1.7m/s) AoV Peak P.8 mmHg (<20mmHg) LVOT Max Shimon:  0.47 m/s (<=1.1m/s) LVOT Diameter: 1.90 cm  (1.8-2.4cm) AoV Area,Vmax: 1.57 cm2 (2.5-4.5cm2) TRICUSPID VALVE/RVSP:                   Normal Ranges: IVC Diam: 1.05 cm  [id number] Fausto Andersonsa DENNIS Electronically signed on 2024 at 2:53:04 PM  ** Final **     XR foot left 3+ views    Result Date: 2024  Interpreted By:  Real Mendieta, STUDY: XR FOOT LEFT 1-2 VIEWS; 2024 4:15 pm   INDICATION: Signs/Symptoms:osteomyelitis. Pain   COMPARISON: 2023   ACCESSION NUMBER(S): [id number]   ORDERING CLINICIAN: BAN GIFFORD   TECHNIQUE: Views:  AP, Lat, Oblique, left foot   FINDINGS: RESULT: There is no evidence for fracture or dislocation. 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 Mild hammertoe deformities. Joint spaces appear adequately maintained. Soft tissue ulceration of the posterior aspect of the heel however no evidence for bony erosion to suggest osteomyelitis.       No evidence for acute fracture or acute bony erosion to suggest osteomyelitis. Chronic changes as described.   Signed by: Real Mendieta 2024 7:15 PM Dictation workstation:   [id number]    CT chest abdomen pelvis wo IV contrast    Result Date: 2024  Interpreted By:  Maria Garcia, STUDY: CT CHEST ABDOMEN PELVIS WO CONTRAST;  2024 11:42 pm   INDICATION: Mental status change. Elevated white blood cell count with infectious workup.   COMPARISON: 2023   ACCESSION NUMBER(S): [id number]   ORDERING CLINICIAN: BAN GIFFORD   TECHNIQUE: CT of the chest, abdomen and pelvis was performed  with no oral or intravenous contrast administered. Sagittal and coronal reformations were completed by the technologist at the acquisition scanner.   All CT examinations are performed with 1 or more of the following dose reduction techniques: Automated exposure control, adjustment of mA and/or kv according to patient's size, or use of iterative reconstruction techniques.   FINDINGS: Please note that the study is limited without intravenous contrast.   CHEST: Bilateral pleural effusions are present with right effusion moderately small in size and with the left effusion moderately small in size as well. There is shift of the heart and mediastinum to the left of midline due to atelectasis of the left upper lobe. There is consolidation throughout left lower lobe with air bronchograms noted. On the right, there is compressive atelectasis seen posteriorly in the right lower lobe.   There is mild reactive mediastinal adenopathy seen at this time with mediastinal lymph nodes increased in size since prior study. Several of these mediastinal nodes are at the upper limits of normal measuring 8 mm in short axis diameter.   No pericardial effusion is present. The cardiac size is normal with mitral annulus calcification.   There has been prior bilateral mastectomy with reconstruction of the left breast with implant placement. Surgical clips are visible within the left axilla. Stent grafts are visible within the left upper extremity and within the left innominate, subclavian as well as axillary veins.   A peripherally calcified 1.8 cm nodule arises from the lower pole of left thyroid lobe.   ABDOMEN:   LIVER: Unremarkable.   BILE DUCTS: No intrahepatic or extrahepatic biliary ductal dilatation is seen.   GALLBLADDER: Cholelithiasis is observed with some calcification of the gallbladder wall demonstrated as well. No gallbladder wall thickening or pericholecystic fluid is evident.   PANCREAS: Unremarkable   SPLEEN: Unremarkable    ADRENAL GLANDS: Unremarkable   KIDNEYS AND URETERS: Kidneys are small in size with extensive renal artery calcification demonstrated.   PELVIS:   BLADDER: Amos catheter is present within the decompressed urinary bladder.   REPRODUCTIVE ORGANS: Calcification of the arcuate arteries within the uterus is seen. There is no uterine enlargement or adnexal mass.   BOWEL: A rectal balloon is seen. There is no abnormal distention of the intestinal tract.   VESSELS: There is atherosclerosis of the thoracoabdominal aorta and iliac arteries with calcification in the walls of the celiac, splenic, hepatic, and mesenteric arteries.   PERITONEUM/RETROPERITONEUM/LYMPH NODES: There is a minimal amount of ascites seen about the liver and spleen with mild presacral edema noted.   ABDOMINAL WALL: There is anasarca involving the soft tissues of the abdomen and pelvis. Postoperative change from ventral hernia repair is seen.   BONES: Bilateral sacroiliitis is seen. There is lumbar dextroscoliosis. Chronic rotator cuff tear is present bilaterally with osteoarthritis of both shoulders, right greater than left.       Chest 1.  Moderately small bilateral pleural effusions with left upper lobe atelectasis and compressive atelectasis seen posteriorly in right lower lobe. A left lower lobe pneumonia is identified. There is extensive airspace consolidation within left lower lobe with air bronchograms observed. Mild mediastinal reactive adenopathy is present as well.   Abdomen-Pelvis 1.  Cholelithiasis without evidence of cholecystitis. 2. Small amount of ascites with mild presacral edema and anasarca within the soft tissues of the abdominal wall. 3. Renal atrophy with extensive vascular calcifications.     MACRO: None   Signed by: Maria Garcia 1/21/2024 8:30 AM Dictation workstation:   [id number]    CT head wo IV contrast    Result Date: 1/21/2024  Interpreted By:  Maria Garcia, STUDY: CT HEAD WO IV CONTRAST 1/20/2024 11:42 pm   INDICATION:  Signs/Symptoms:mental status changes   COMPARISON: 12/11/2023   ACCESSION NUMBER(S): [id number]   ORDERING CLINICIAN: BAN GIFFORD   TECHNIQUE: Unenhanced axial images of the brain are completed.   All CT examinations are performed with 1 or more of the following dose reduction techniques: Automated exposure control, adjustment of mA and/or kv according to patient's size, or use of iterative reconstruction techniques.   FINDINGS: Helical unenhanced axial images of the brain demonstrate a mild to moderate degree of ventricular enlargement with proportionate widening of the sulci and sylvian fissures. There is no midline shift, mass effect, extra-axial fluid collection, or acute intracranial hemorrhage. There is diminished density seen in the periventricular white matter indicating chronic microvascular ischemic disease. There is calcified plaque seen within the distal vertebral and internal carotid arteries bilaterally. No calvarial abnormality is seen.       Atrophy and chronic microvascular ischemic disease without acute intracranial process.   Signed by: Maria Garcia 1/21/2024 8:09 AM Dictation workstation:   [id number]    XR chest 1 view    Result Date: 1/20/2024  Interpreted By:  Brendon Perez, STUDY: XR CHEST 1 VIEW; 1/20/2024 5:03 pm   INDICATION: CLINICAL INFORMATION: Signs/Symptoms:Insertion right IJ central line, also left thoracentesis.   COMPARISON: 01/19/2024 at 1338 hours   ACCESSION NUMBER(S): [id number]   ORDERING CLINICIAN: BOZENA ANTONIO   TECHNIQUE: Portable chest one view.   FINDINGS: The cardiac size is indeterminate in view of the AP projection. There is no change in the right-sided dual port central venous catheter. There is a new right internal jugular venous catheter present with the tip at approximately same level as the dual port central venous catheter, overlying the mid to lower right atrium. There is no evidence for pneumothorax. Patient has a history of left thoracentesis without  evidence for pneumothorax on the left. There is bilateral basilar alveolar infiltrate and effusions. Left effusion is decreased compared to the study from 1 day earlier.   Surgical clips are identified within left chest wall. Endovascular stent is identified in the distribution of the left subclavian artery.       1. Status post right-sided central venous catheter placement as described above with the tip overlying the mid to caudal aspect of the right atrium. There is no evidence for pneumothorax. 2. No evidence for left-sided pneumothorax after left-sided thoracentesis. Decrease in the left effusion. 3. Bilateral basilar infiltrates and effusions are present. Follow-up to assure complete clearing is suggested.   MACRO: none   Signed by: Brendon Perez 1/20/2024 5:11 PM Dictation workstation:   [id number]    XR chest 1 view    Result Date: 1/19/2024  Interpreted By:  Real Mendieta, STUDY: XR CHEST 1 VIEW; 1/19/2024 1:38 pm   INDICATION: Signs/Symptoms:dyspnea.   COMPARISON: 12/26/2023   ACCESSION NUMBER(S): [id number]   ORDERING CLINICIAN: EMELY GOLDSMITH   TECHNIQUE: 1 view of the chest was performed.   FINDINGS: There may be a minimal right pleural effusion. Slight prominence of the interstitium otherwise the right lung is clear. Improved appearance of the left lung with improved aeration of the right upper lobe. There is opacification of the left lower lobe possibly due to large pleural effusion which is similar to the prior study. No pneumothorax. Right-sided dual lumen central line catheter with tip at the proximal atrium. The cardiomediastinal silhouette is within normal limits. Left-sided subclavian stents are again noted.       Improved aeration and appearance of the left lung superiorly however there is a persistent large left pleural effusion and opacification of the left lower lobe.   Signed by: Real Mendieta 1/19/2024 1:44 PM Dictation workstation:   [id number]    Electrocardiogram, 12-lead PRN  ACS symptoms    Result Date: 1/7/2024  Sinus rhythm Right bundle branch block Septal infarct (cited on or before 06-DEC-2023) Abnormal ECG When compared with ECG of 06-DEC-2023 13:06, Questionable change in initial forces of Septal leads Confirmed by Herminio Lemus ([id number]) on 1/7/2024 11:04:01 AM     Assessment/Plan   Septic shock-on pressors-resolving  Left-sided pleural effusion   Left lower lobe pneumonia-treated  Proteus urinary tract infection-treated  History of MRSA endocarditis  History of C. 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

## 2024-01-27 NOTE — PROGRESS NOTES
[facility] Critical Care Medicine       Date:  1/27/2024  Patient:  Ayanna Gross  YOB: 1950  MRN:  [id number]   Admit Date:  1/19/2024  ========================================================================================================    Chief Complaint   Patient presents with    Shortness of Breath     Patient with SOB and fever. Is very lethargic. Dialysis MWF but did not go today dt SOB and fever         History of Present Illness:  Ayanna Gross is a 73 y.o. year old female patient with Past Medical History of  esenting with anemia, fever worsening sacral and lower extremity wounds and concerns from the nursing facility for left pneumonia. Patient presents to the emergency department found to be febrile she was hypotensive but responded to 500 cc fluid bolus she was found to have a hemoglobin of 6 she was begun and ordered for a single unit of blood transfusion will actually provide 2 units of packed cells additional IV fluids to complete her volume resuscitation given her fever clinical evidence of hypovolemia and infection with an elevated lactate of 2.3 she received a total of approximately 1800 mL of fluid she is DNR/DNI by advanced directives but ICU level care is acceptable her granddaughter questions disorder but it was confirmed and signed by her  who is her POA.  She will be admitted to the intensive care unit given her frail compromised state and high risk of morbidity and mortality necessitating IV antibiotics fluids blood transfusion and ultimately will receive dialysis likely tomorrow wound care consultation to general surgery has been requested as well as consultation with the intensivist infectious disease and nephrology wounds are cultured C. difficile is requested she remains on oral vancomycin for suppression given recent C. difficile infection in December she is initiated on pharmacy dosing of vancomycin and Zosyn 2.25 g IV every 6 hours. She continues on Eliquis  at this time for DVT prophylaxis as well as atrial fibrillation.     Further Hx: Ayanna Gross 73-year-old female with past medical history of atrial fibrillation, end-stage renal disease, C. difficile infection, moderate sized recurrent left pleural effusions, and MRSA bacteremia with mitral valve vegetation admitted to ICU for management of septic shock.  On morning exam patient was on 0.22 of levo just to maintain a MAP of 60 and SBP of 88.  Last documented echocardiogram from [facility] shows EF between 55 to 60% with grade 2 left ventricular diastolic dysfunction, and moderate circumferential pericardial effusion.  Unable to find adequate documentation of who treated her endocarditis, but there was documentation that she was currently still receiving treatment when she was admitted to [facility]. On further chart review Dr. Coulter is her infectious disease doctor. She also had 2 thoracentesis's for moderate to large sided left pleural effusions with cytology showing no malignancy, but unable to differentiate between exudative and transudative at this time.          Interval ICU Events:  1/20: Pt missed last dialysis appointment, no signs of urgent need today, but nephrology Dr. Cabello is following. Pt on high requirements of levophed. She is on broad spectrum abx for coverage currently ID Dr. Coulter following. Pt switches from being A&O x 3 and lethargy. Goal today is to get more central access, david, and thoracentesis.      1/21: Pt more lethargic this AM requiring uptitration of her Levophed to 0.28. Diagnostic and therapeutic thoracentesis yesterday over 1L of hazy straw color fluid. Currently still on vanc, zosyn, and doxy for abx. Potential dialysis this today, but no urgency.      1/22: Pt come in and out of lethargic states throughout the day. Still on high doses of levophed this morning. Per nephrologist Dr. Cabello titrate for an SBP goal of 70 and mentation. Left  Pleural fluid analysis resulted as transudative per lights criteria. Tablo today per nephrology. ID managing with empiric coverage. Source control may not have been met. Podiatry, wound, and surgery to evaluate multiple wounds over the body.      1/23: Pt received dialysis via tablo yesterday. Very similar status as yesterday from lethargic states to well mentation. Patient remains on Levophed still at 0.36 with empiric coverage with vanc + zosyn & doxy. Increase in patients total bilirubin today 0.9-1.6. Consulting services include: Wound, Vascular, Podiatry, Acute surgery, Palliative, Infectious Disease, and Nephrology.      1/24: Patient stable overnight, norepinephrine weaned down to 0.16 with stable pressures and new goal of systolics > 70     1/25: Patient resting comfortably in bed this morning requiring 0.05 of Levophed for systolic goal of 70. She is more alert than prior and complains of less pain. She will receive Tablo today with 1.5L of fluid removed per Nephrology.     1/26: Patient resting comfortably in bed this morning requiring 0.06 Levophed and midodrine 15 mg TID. Tablo yesterday with 1.5L removed. Empiric abx stopped 2 days ago. SLP signed off with recommendations.       Medical History:  Past Medical History:   Diagnosis Date    Asthma     CAD (coronary artery disease)     CHF (congestive heart failure) (CMS/[facility])     Chronic kidney disease on chronic dialysis (CMS/[facility])     Hypertension     Personal history of diseases of the blood and blood-forming organs and certain disorders involving the immune mechanism     History of autoimmune disorder    Sleep apnea     Type 2 diabetes mellitus without complications (CMS/[facility]) 09/15/2022    Diabetes mellitus     Past Surgical History:   Procedure Laterality Date    CARPAL TUNNEL RELEASE  11/18/2013    Neuroplasty Decompression Median Nerve At Carpal Tunnel    HAND SURGERY  11/18/2013    Hand Surgery                                                                                                                                                           MASTECTOMY, PARTIAL  04/10/2014    Right Breast Partial Mastectomy    MR HEAD ANGIO WO IV CONTRAST  8/29/2023    MR HEAD ANGIO WO IV CONTRAST [facility]    OTHER SURGICAL HISTORY  11/18/2013    Breast Reconstruction With Implant Prosthesis    OTHER SURGICAL HISTORY  11/18/2013    Thyroid Surgery Substernal Thyroidectomy Partial    OTHER SURGICAL HISTORY  11/18/2013    Modified Radical Mastectomy Left Breast    OTHER SURGICAL HISTORY  04/04/2022    Carpal tunnel surgery    OTHER SURGICAL HISTORY  04/04/2022    Foot surgery    OTHER SURGICAL HISTORY  04/04/2022    Hernia repair    SENTINEL LYMPH NODE BIOPSY  04/10/2014    [place] Lymph Node Biopsy    TONSILLECTOMY  11/18/2013    Tonsillectomy    UMBILICAL HERNIA REPAIR  11/18/2013    Umbilical Hernia Repair    US GUIDED PERCUTANEOUS PLACEMENT  6/29/2023    US GUIDED PERCUTANEOUS PLACEMENT [facility]     Medications Prior to Admission   Medication Sig Dispense Refill Last Dose    apixaban (Eliquis) 2.5 mg tablet Take 1 tablet (2.5 mg) by mouth 2 times a day.       blood sugar diagnostic strip 1 x daily e11.65 for 90 days       blood-glucose sensor (Dexcom G6 Sensor) device Check blood sugar when needed and as instructed 6 each 3     doxycycline (Vibramycin) 100 mg capsule Take 1 capsule (100 mg) by mouth once daily. Take with at least 8 ounces (large glass) of water, do not lie down for 30 minutes after Do not start before January 6, 2024.       escitalopram (Lexapro) 10 mg tablet 1 tablet Orally Once a day for 30 day(s)       febuxostat (Uloric) 40 mg tablet TAKE ONE TABLET BY MOUTH EVERY OTHER DAY       fenofibrate (Triglide) 160 mg tablet Take 1 tablet (160 mg) by mouth once daily.       gabapentin (Neurontin) 100 mg capsule Take 1 capsule (100 mg) by mouth 2 times a day.       insulin lispro (HumaLOG) 100 unit/mL injection Inject 0-0.1 mL (0-10  Units) under the skin 3 times a day with meals. Take as directed per insulin instructions.       midodrine (Proamatine) 5 mg tablet Take 3 tablets (15 mg) by mouth 3 times a day with meals.       nystatin (Mycostatin) 100,000 unit/gram powder APPLY 1 GRAM TO SKIN TWO TIMES A DAY 30 g 0     oxyCODONE-acetaminophen (Percocet) 5-325 mg tablet Take 1 tablet by mouth every 12 hours if needed for severe pain (7 - 10). 120 tablet 0     simvastatin (Zocor) 20 mg tablet TAKE 1 TABLET BY MOUTH DAILY 60 tablet 5      Patient has no known allergies.  Social History     Tobacco Use    Smoking status: Never    Smokeless tobacco: Never   Substance Use Topics    Alcohol use: Not Currently    Drug use: Never     Family History   Problem Relation Name Age of Onset    Lymphoma Mother      Prostate cancer Father          passed away [address] Medications:    norepinephrine, 0.01-3 mcg/kg/min, Last Rate: 0.06 mcg/kg/min (01/27/24 1000)  vasopressin, 0.03 Units/min, Last Rate: Stopped (01/26/24 1315)          Current Facility-Administered Medications:     acetaminophen (Tylenol) tablet 650 mg, 650 mg, oral, TID, Yusuf De La Garza PA-C, 650 mg at 01/27/24 0826    apixaban (Eliquis) tablet 2.5 mg, 2.5 mg, oral, BID, Judd Tavera DO, 2.5 mg at 01/27/24 0826    dextrose 10 % in water (D10W) infusion, 0.3 g/kg/hr, intravenous, Once PRN, Judd Tavera DO    dextrose 50 % injection 25 g, 25 g, intravenous, q15 min PRN, Judd Tavera DO    doxycycline (Vibramycin) capsule 100 mg, 100 mg, oral, Daily, Yusuf De La Garza PA-C, 100 mg at 01/26/24 1404    escitalopram (Lexapro) tablet 10 mg, 10 mg, oral, Nightly, Judd Tavera DO, 10 mg at 01/26/24 2059    gabapentin (Neurontin) capsule 100 mg, 100 mg, oral, BID, Judd Tavera DO, 100 mg at 01/27/24 0826    glucagon (Glucagen) injection 1 mg, 1 mg, intramuscular, q15 min PRN, Judd Tavera DO    heparin 1,000 unit/mL injection 2,000  Units, 2,000 Units, intra-catheter, After Dialysis, Saeed Mondragon MD, 1,600 Units at 01/24/24 1631    heparin 1,000 unit/mL injection 2,000 Units, 2,000 Units, intra-catheter, After Dialysis, Saeed Mondragon MD, 1,600 Units at 01/24/24 1624    heparin 1,000 unit/mL injection 2,000 Units, 2,000 Units, intra-catheter, After Dialysis, Saeed Mondragon MD    heparin 1,000 unit/mL injection 2,000 Units, 2,000 Units, intra-catheter, After Dialysis, Saeed Mondragon MD    insulin lispro (HumaLOG) injection 0-10 Units, 0-10 Units, subcutaneous, TID with meals, Judd Tavera DO, 2 Units at 01/26/24 0809    ipratropium-albuteroL (Duo-Neb) 0.5-2.5 mg/3 mL nebulizer solution 3 mL, 3 mL, nebulization, q8h, LILLIE Moran, 3 mL at 01/27/24 0753    midodrine (Proamatine) tablet 15 mg, 15 mg, oral, TID with meals, Judd Tavera DO, 15 mg at 01/27/24 0826    norepinephrine (Levophed) 8 mg in dextrose 5% 250 mL (0.032 mg/mL) infusion (premix), 0.01-3 mcg/kg/min, intravenous, Continuous, Yusuf De La Garza PA-C, Last Rate: 8.36 mL/hr at 01/27/24 1000, 0.06 mcg/kg/min at 01/27/24 1000    nystatin (Mycostatin) 100,000 unit/gram powder, , Topical, BID, Judd Tavera DO, Given at 01/27/24 0828    nystatin (Mycostatin) cream, , Topical, BID, LILLIE Blair, Given at 01/27/24 0828    oxygen (O2) therapy, , inhalation, Continuous PRN - O2/gases, Judd Tavera DO, 4 L/min at 01/23/24 1600    pantoprazole (ProtoNix) EC tablet 40 mg, 40 mg, oral, Daily, 40 mg at 01/27/24 0826 **OR** pantoprazole (ProtoNix) injection 40 mg, 40 mg, intravenous, Daily, Yusuf De La Garza PA-C, 40 mg at 01/21/24 0824    sennosides-docusate sodium (Judy-Colace) 8.6-50 mg per tablet 1 tablet, 1 tablet, oral, Nightly PRN, Yusuf De La Garza PA-C    sodium chloride 3 % nebulizer solution 3 mL, 3 mL, nebulization, TID, Charly Grossman MD, 3 mL at 01/26/24 2027    vancomycin (Vancocin) capsule 125 mg, 125 mg, oral,  Daily, Katlyn ODETTE , APRN-CNP, 125 mg at 01/27/24 0826    vasopressin (Vasostrict) 0.2 unit/mL infusion, 0.03 Units/min, intravenous, Continuous, Dhiraj De La Garza MD, Stopped at 01/26/24 1315    zinc oxide 20 % ointment 1 Application, 1 Application, Topical, BID, Nickolas Gallego, APRN-CNP, 1 Application at 01/27/24 0828    Review of Systems:  14 point review of systems was completed and negative except for those specially mention in my HPI    Physical Exam:    Heart Rate:  []   Temp:  [36.3 °C (97.3 °F)-36.9 °C (98.4 °F)]   Resp:  [10-29]   SpO2:  [83 %-100 %]     Physical Exam  Vitals and nursing note reviewed.   Constitutional:       Appearance: Normal appearance. She is not diaphoretic.   HENT:      Head: Normocephalic and atraumatic.      Nose: Nose normal. No congestion.      Mouth/Throat:      Mouth: Mucous membranes are moist.      Pharynx: Oropharynx is clear. No oropharyngeal exudate.   Eyes:      General: No scleral icterus.     Extraocular Movements: Extraocular movements intact.      Conjunctiva/sclera: Conjunctivae normal.      Pupils: Pupils are equal, round, and reactive to light.   Cardiovascular:      Rate and Rhythm: Normal rate and regular rhythm.   Pulmonary:      Effort: Pulmonary effort is normal.      Breath sounds: Wheezing present.   Abdominal:      General: Abdomen is flat.      Palpations: Abdomen is soft.      Tenderness: There is no abdominal tenderness. There is no guarding or rebound.   Musculoskeletal:         General: Swelling present. Normal range of motion.      Cervical back: Normal range of motion and neck supple. No tenderness.      Right lower leg: Edema present.      Left lower leg: Edema present.   Lymphadenopathy:      Cervical: No cervical adenopathy.   Skin:     General: Skin is warm.      Capillary Refill: Capillary refill takes 2 to 3 seconds.      Coloration: Skin is not jaundiced.      Findings: Bruising, erythema, lesion and rash present.   Neurological:       General: No focal deficit present.      Mental Status: She is alert.      Cranial Nerves: Cranial nerves 2-12 are intact. No cranial nerve deficit.      Motor: Weakness present.         Objective:    I have reviewed all medications, laboratory results, and imaging pertinent for today's encounter.    FiO2 (%):  [28 %] 28 %      Intake/Output Summary (Last 24 hours) at 1/27/2024 1046  Last data filed at 1/27/2024 1000  Gross per 24 hour   Intake 889.84 ml   Output 2150 ml   Net -1260.16 ml       Daily Labs:  CBC:   Results from last 7 days   Lab Units 01/27/24  0505   WBC AUTO x10*3/uL 12.0*   HEMOGLOBIN g/dL 9.5*   HEMATOCRIT % 29.7*   MCV fL 91     BMP:    Results from last 7 days   Lab Units 01/27/24  0505   SODIUM mmol/L 134   POTASSIUM mmol/L 3.5   CHLORIDE mmol/L 98   CO2 mmol/L 23*   BUN mg/dL 7*   CREATININE mg/dL 1.90*   CALCIUM mg/dL 8.7   GLUCOSE mg/dL 134*   MAGNESIUM mg/dL 1.90       Assessment/Plan:  I am currently managing this critically ill patient for the following problems:  Septic Shock   ESRD   Chronic Hypotension   Recurrent Pleural Effusions   Vasculopathy   Multiple Wounds with and with out infections  C. Diff infection  Acute Metabolic Encephalopathy     Neuro/Psych/Pain Ctrl/Sedation:  Acute Metabolic Encephalopathy  -Pain Control: Tylenol for mild  -CAM Assessment every shift, neuro assessments q4   -Palliative consulted   -restarted Lexapro and gabapentin  -CT head No acute finding   -Will continue to monitor for acute encephalitis, currently appears to be clearing with less periods of confusion     Respiratory/ENT:  Ct chest showed multiple nodules on lung in August 23   Moderate to large left sided pleural effusion   -Currently on NC 2L, patient reports breathing easier and productive cough  -Thoracentesis 1/20 over 1L of hazy straw colored fluid, Lights criteria suggest Transudative    -SP02 >92%, Nasal canula titration   -CT C/A/P A left lower lobe pneumonia is identified. There is  extensive airspace consolidation  -continuous pulse ox  -Pulm hygiene     Cardiovascular:  Hx of endocarditis   Septic Shock  Chronic Hypotension  Vasculopathy   -Levophed titrate to maintain SBP > 70, plan to wean levo off when able  -Levo weaned down today with overall picture towards improvement of septic state  -Home midodrine 15 mg TID when mental status is appropriate   -Continuous cardiac monitoring     GI:  #Suspected [place] syndrome  -Renal Low phos Low K assess mental status before feeding   -Flexacil for stools   -BR with senna/colace PRN   -Protonix daily   -Slightly uptrending bilirubin with no signs of jaundice or hepatic failure, Hepatic panel scheduled for am draw        Renal/Volume Status (Intra & Extravascular):  ESRD   Metabolic lactic acidosis-resolved  -Nephrology following for dialysis needs  -Elevated Lactate 2/2 hypoperfusion -resolved  -Conservative fluid management, will trial 250ml of 5% albumin to test intravascular volume status  -Daily BMP, Replete electrolytes as indicated for ESRD    -Continue Uloric for gout prophylaxis   -Tablo today     Endocrine  DM2   -SSI Q4h     Infectious Disease:  Hx of MRSA bacteremia with endocarditis  Multiple Wounds with and with out infections  C. Diff infection  -WBC 22.9 -> 21.0  -> 21.2 -> 16.5-> 13.7 -> 12.0   -Per ID add doxycycline 100mg every 24 hours   -blood cultures No growth day 3  -Urine cultures grew Proteus, not suspected as source of sepsis especially in setting of anuresis    -ID Consulted, Appreciate ID consult  -C. Diff & MRSA PCR negative     Heme/Onc:  Anemia of chronic disease   -monitor for s/s anemia  -transfuse for hgb <7  -daily CBC  -Duplex upper extremity US negative for UE DVT     OBGYN/MSK:  Multiple Wounds with and with out infections  -Surgery consulted   -Wound care consulted for dressing other wounds   -Podiatry consulted  -XR Left ankle not suspicious for osteomyelitis   -XR Right ankle and tib/fib not suspicious for  osteomyelitis   -padded pressure points  -ICU skin protocol     Ethics/Code Status:  Full code     :  DVT Prophylaxis: Eliquis  GI Prophylaxis: PPI  Bowel Regimen: Senna  Diet: Renal regular  CVC: R internal Jugular  Chandni: R radial  Amos: None  Restraints: None  Dispo: Will remain in ICU     This note was prepared using voice recognition software. The details of this note are correct and have been reviewed, and corrected to the best of my ability. Some grammatical areas may persist related to the Dragon software.      I have reviewed all medications, laboratory results, and imaging pertinent for today's encounter.  Plan Discussed with Dr. Grossman  Critical Care Time: 35 minutes  Critical Care Chana De La Garza PA-C

## 2024-01-27 NOTE — CARE PLAN
The patient's goals for the shift include unable to state    The clinical goals for the shift include Patient will have decreasing pressor requirements    Over the shift, the patient did not make progress toward the following goals. Barriers to progression include persistent need for pressors. Recommendations to address these barriers include titrating as tolerated and encouraging oral intake,.   Problem: Pain  Goal: My pain/discomfort is manageable  Outcome: Progressing  Flowsheets (Taken 1/23/2024 1634 by Peri Martin, RN)  Resident's pain/discomfort is manageable:   Include resident/family/caregiver in decisions related to pain management   Offer non-pharmacological pain management interventions   Administer pain medication prior to activities that may trigger pain   Identify and avoid pain triggers     Problem: Safety  Goal: Patient will be injury free during hospitalization  Outcome: Progressing  Goal: I will remain free of falls  Outcome: Progressing  Flowsheets (Taken 1/21/2024 2323 by Judith Duncan RN)  Resident will remain free of falls:   Utilize fall response kit   Apply bed/chair alarms as appropriate   Assist with toileting as orderd   Maintain bed at position as ordered (chair height, low bed)   Consider camera monitoring   Assess and monitor medications that may increase fall risk   Consult with physical therapy as needed   Consider transfer to room close to nurses' station   Visual checks per facility policy   Accompany resident as ordered (ex. 1:1, stand-by assist, dayroom monitoring, 15 minute checks, line of sight)   Use gait belt for all transfers     Problem: Daily Care  Goal: Daily care needs are met  Outcome: Progressing  Flowsheets (Taken 1/23/2024 1634 by Peri Martin RN)  Daily care needs are met:   Assess and monitor ability to perform self care and identify potential discharge needs   Assess skin integrity/risk for skin breakdown   Assist patient with activities of daily  living as needed   Provide mouth care     Problem: Psychosocial Needs  Goal: Demonstrates ability to cope with hospitalization/illness  Outcome: Progressing  Flowsheets (Taken 1/23/2024 1634 by Peri Martin, RN)  Demonstrates ability to cope with hospitalization/illness:   Encourage verbalization of feelings/concerns/expectations   Provide low-stimulation environment as needed   Assist resident to identify and practice own strengths and abilities   Encourage resident to set and complete small goals for self   Encourage participation in diversional activities   Include resident/family/caregiver in decisions related to psychosocial needs  Goal: Collaborate with me, my family, and caregiver to identify my specific goals  Outcome: Progressing     Problem: Discharge Barriers  Goal: My discharge needs are met  Outcome: Progressing  Flowsheets (Taken 1/26/2024 1306)  Resident's discharge needs are met:   Identify potential discharge barriers on admission and throughout stay   Involve resident/family/caregiver in discharge planning process     Problem: Fall/Injury  Goal: Not fall by end of shift  Outcome: Progressing  Goal: Be free from injury by end of the shift  Outcome: Progressing  Goal: Verbalize understanding of personal risk factors for fall in the hospital  Outcome: Progressing  Goal: Verbalize understanding of risk factor reduction measures to prevent injury from fall in the home  Outcome: Progressing  Goal: Pace activities to prevent fatigue by end of the shift  Outcome: Progressing     Problem: Skin  Goal: Decreased wound size/increased tissue granulation at next dressing change  Outcome: Progressing  Flowsheets (Taken 1/27/2024 1316)  Decreased wound size/increased tissue granulation at next dressing change:   Promote sleep for wound healing   Utilize specialty bed per algorithm   Protective dressings over bony prominences  Goal: Participates in plan/prevention/treatment measures  Outcome:  Progressing  Flowsheets (Taken 1/27/2024 1316)  Participates in plan/prevention/treatment measures:   Discuss with provider PT/OT consult   Elevate heels  Goal: Prevent/manage excess moisture  Outcome: Progressing  Flowsheets (Taken 1/27/2024 1316)  Prevent/manage excess moisture:   Cleanse incontinence/protect with barrier cream   Moisturize dry skin   Follow provider orders for dressing changes   Monitor for/manage infection if present  Goal: Prevent/minimize sheer/friction injuries  Outcome: Progressing  Flowsheets (Taken 1/27/2024 1316)  Prevent/minimize sheer/friction injuries:   Complete micro-shifts as needed if patient unable. Adjust patient position to relieve pressure points, not a full turn   HOB 30 degrees or less   Increase activity/out of bed for meals   Turn/reposition every 2 hours/use positioning/transfer devices   Use pull sheet   Utilize specialty bed per algorithm  Goal: Promote/optimize nutrition  Outcome: Progressing  Flowsheets (Taken 1/27/2024 1316)  Promote/optimize nutrition:   Assist with feeding   Discuss with provider if NPO > 2 days   Offer water/supplements/favorite foods   Consume > 50% meals/supplements   Monitor/record intake including meals   Reassess MST if dietician not consulted  Goal: Promote skin healing  Outcome: Progressing  Flowsheets (Taken 1/27/2024 1316)  Promote skin healing:   Assess skin/pad under line(s)/device(s)   Ensure correct size (line/device) and apply per  instructions   Protective dressings over bony prominences   Rotate device position/do not position patient on device   Turn/reposition every 2 hours/use positioning/transfer devices     Problem: Pain  Goal: Takes deep breaths with improved pain control throughout the shift  Outcome: Progressing  Goal: Turns in bed with improved pain control throughout the shift  Outcome: Progressing  Goal: Walks with improved pain control throughout the shift  Outcome: Progressing  Goal: Performs ADL's with  improved pain control throughout shift  Outcome: Progressing  Goal: Participates in PT with improved pain control throughout the shift  Outcome: Progressing  Goal: Free from opioid side effects throughout the shift  Outcome: Progressing  Goal: Free from acute confusion related to pain meds throughout the shift  Outcome: Progressing     Problem: Respiratory  Goal: Clear secretions with interventions this shift  Outcome: Progressing  Flowsheets (Taken 1/23/2024 1634 by Peri Martin RN)  Clear secretions with interventions this shift:   Encourage/provide pulmonary hygiene/secretion clearance   Med administration/monitoring of effect  Goal: Minimize anxiety/maximize coping throughout shift  Outcome: Progressing  Flowsheets (Taken 1/23/2024 1634 by Peri Martin RN)  Minimize anxiety/maximize coping throughout shift:   Med administration/monitoring of effect   Monitor pain/anxiety level  Goal: Minimal/no exertional discomfort or dyspnea this shift  Outcome: Progressing  Flowsheets (Taken 1/26/2024 1306)  Minimal/no exertional discomfort or dyspnea this shift: Positioning to promote ventilation/comfort  Goal: No signs of respiratory distress (eg. Use of accessory muscles. Peds grunting)  Outcome: Progressing  Goal: Patent airway maintained this shift  Outcome: Progressing  Goal: Tolerate pulmonary toileting this shift  Outcome: Progressing  Flowsheets (Taken 1/26/2024 1306)  Tolerate pulmonary toileting this shift: Positioning to promote ventilation/comfort  Goal: Verbalize decreased shortness of breath this shift  Outcome: Progressing  Flowsheets (Taken 1/23/2024 1634 by Peri Martin RN)  Verbalize decreased shortness of breath this shift: Encourage/provide pulmonary hygiene/secretion clearance  Goal: Wean oxygen to maintain O2 saturation per order/standard this shift  Outcome: Progressing  Flowsheets (Taken 1/23/2024 1634 by Peri Martin RN)  Wean oxygen to maintain O2 saturation per  order/standard this shift: Encourage activity/mobility  Goal: Increase self care and/or family involvement in next 24 hours  Outcome: Progressing  Flowsheets (Taken 1/26/2024 1306)  Increase self care and/or family involvement in next 24 hours: Encourage activity/mobility     Problem: Diabetes  Goal: Maintain electrolyte levels within acceptable range throughout shift  Outcome: Progressing  Flowsheets (Taken 1/26/2024 1306)  Maintain electrolyte levels within acceptable range throughout shift: Med administration/monitoring of effect  Goal: Maintain glucose levels >70mg/dl to <250mg/dl throughout shift  Outcome: Progressing  Flowsheets (Taken 1/27/2024 1316)  Maintain glucose levels >70mg/dl to <250mg/dl throughout shift: Med administration/monitoring of effect  Goal: No changes in neurological exam by end of shift  Outcome: Progressing  Flowsheets (Taken 1/27/2024 1316)  No changes in neurological exam by end of shift: Complete frequent neurological assessments  Goal: Learn about and adhere to nutrition recommendations by end of shift  Outcome: Progressing  Flowsheets (Taken 1/27/2024 1316)  Learn about and adhere to nutrition recommendations by end of shift: Ensure/encourage compliance with appropriate diet  Goal: Vital signs within normal range for age by end of shift  Outcome: Progressing  Flowsheets (Taken 1/27/2024 1316)  Vital signs within normal range for age by end of shift: Med administration/monitoring of effect  Goal: Increase self care and/or family involovement by end of shift  Outcome: Progressing  Goal: Receive DSME education by end of shift  Outcome: Progressing  Flowsheets (Taken 1/27/2024 1316)  Receive DSME education by end of shift: Provide patient centered education on Diabetic Self Management Education     Problem: Discharge Planning  Goal: Discharge to home or other facility with appropriate resources  Outcome: Progressing  Flowsheets (Taken 1/26/2024 1306)  Discharge to home or other facility  with appropriate resources:   Identify barriers to discharge with patient and caregiver   Arrange for needed discharge resources and transportation as appropriate   Identify discharge learning needs (meds, wound care, etc)   Arrange for interpreters to assist at discharge as needed   Refer to discharge planning if patient needs post-hospital services based on physician order or complex needs related to functional status, cognitive ability or social support system     Problem: Chronic Conditions and Co-morbidities  Goal: Patient's chronic conditions and co-morbidity symptoms are monitored and maintained or improved  Outcome: Progressing  Flowsheets (Taken 1/26/2024 1306)  Care Plan - Patient's Chronic Conditions and Co-Morbidity Symptoms are Monitored and Maintained or Improved:   Monitor and assess patient's chronic conditions and comorbid symptoms for stability, deterioration, or improvement   Collaborate with multidisciplinary team to address chronic and comorbid conditions and prevent exacerbation or deterioration   Update acute care plan with appropriate goals if chronic or comorbid symptoms are exacerbated and prevent overall improvement and discharge

## 2024-01-28 NOTE — CARE PLAN
"The patient's goals for the shift include \"to go home\"     The clinical goals for the shift include Patient will have decreasing pressor requirements    Over the shift, the patient did not make progress toward the following goals. Barriers to progression include persistent need for pressors. Recommendations to address these barriers include titrating as tolerated and updating medical team.   Problem: Pain  Goal: My pain/discomfort is manageable  Outcome: Progressing  Flowsheets (Taken 1/23/2024 1634 by Peri Martin, RN)  Resident's pain/discomfort is manageable:   Include resident/family/caregiver in decisions related to pain management   Offer non-pharmacological pain management interventions   Administer pain medication prior to activities that may trigger pain   Identify and avoid pain triggers     Problem: Safety  Goal: Patient will be injury free during hospitalization  Outcome: Progressing  Goal: I will remain free of falls  Outcome: Progressing  Flowsheets (Taken 1/21/2024 2323 by Judith Duncan RN)  Resident will remain free of falls:   Utilize fall response kit   Apply bed/chair alarms as appropriate   Assist with toileting as orderd   Maintain bed at position as ordered (chair height, low bed)   Consider camera monitoring   Assess and monitor medications that may increase fall risk   Consult with physical therapy as needed   Consider transfer to room close to nurses' station   Visual checks per facility policy   Accompany resident as ordered (ex. 1:1, stand-by assist, dayroom monitoring, 15 minute checks, line of sight)   Use gait belt for all transfers     Problem: Daily Care  Goal: Daily care needs are met  Outcome: Progressing  Flowsheets (Taken 1/23/2024 1634 by Peri Martin RN)  Daily care needs are met:   Assess and monitor ability to perform self care and identify potential discharge needs   Assess skin integrity/risk for skin breakdown   Assist patient with activities of daily living " as needed   Provide mouth care     Problem: Psychosocial Needs  Goal: Demonstrates ability to cope with hospitalization/illness  Outcome: Progressing  Flowsheets (Taken 1/23/2024 1634 by Peri Martin, RN)  Demonstrates ability to cope with hospitalization/illness:   Encourage verbalization of feelings/concerns/expectations   Provide low-stimulation environment as needed   Assist resident to identify and practice own strengths and abilities   Encourage resident to set and complete small goals for self   Encourage participation in diversional activities   Include resident/family/caregiver in decisions related to psychosocial needs  Goal: Collaborate with me, my family, and caregiver to identify my specific goals  Outcome: Progressing     Problem: Discharge Barriers  Goal: My discharge needs are met  Outcome: Progressing  Flowsheets (Taken 1/26/2024 1306)  Resident's discharge needs are met:   Identify potential discharge barriers on admission and throughout stay   Involve resident/family/caregiver in discharge planning process     Problem: Fall/Injury  Goal: Not fall by end of shift  Outcome: Progressing  Goal: Be free from injury by end of the shift  Outcome: Progressing  Goal: Verbalize understanding of personal risk factors for fall in the hospital  Outcome: Progressing  Goal: Verbalize understanding of risk factor reduction measures to prevent injury from fall in the home  Outcome: Progressing  Goal: Pace activities to prevent fatigue by end of the shift  Outcome: Progressing     Problem: Skin  Goal: Decreased wound size/increased tissue granulation at next dressing change  Outcome: Progressing  Flowsheets (Taken 1/28/2024 1325)  Decreased wound size/increased tissue granulation at next dressing change:   Promote sleep for wound healing   Utilize specialty bed per algorithm   Protective dressings over bony prominences  Goal: Participates in plan/prevention/treatment measures  Outcome: Progressing  Flowsheets  (Taken 1/28/2024 1325)  Participates in plan/prevention/treatment measures: Elevate heels  Goal: Prevent/manage excess moisture  Outcome: Progressing  Flowsheets (Taken 1/28/2024 1325)  Prevent/manage excess moisture:   Cleanse incontinence/protect with barrier cream   Moisturize dry skin   Follow provider orders for dressing changes   Monitor for/manage infection if present  Goal: Prevent/minimize sheer/friction injuries  Outcome: Progressing  Flowsheets (Taken 1/28/2024 1325)  Prevent/minimize sheer/friction injuries:   Complete micro-shifts as needed if patient unable. Adjust patient position to relieve pressure points, not a full turn   HOB 30 degrees or less   Turn/reposition every 2 hours/use positioning/transfer devices   Use pull sheet   Utilize specialty bed per algorithm  Goal: Promote/optimize nutrition  Outcome: Progressing  Flowsheets (Taken 1/28/2024 1325)  Promote/optimize nutrition:   Assist with feeding   Offer water/supplements/favorite foods   Consume > 50% meals/supplements   Monitor/record intake including meals   Reassess MST if dietician not consulted  Goal: Promote skin healing  Outcome: Progressing  Flowsheets (Taken 1/28/2024 1325)  Promote skin healing:   Assess skin/pad under line(s)/device(s)   Ensure correct size (line/device) and apply per  instructions   Rotate device position/do not position patient on device   Protective dressings over bony prominences   Turn/reposition every 2 hours/use positioning/transfer devices     Problem: Pain  Goal: Takes deep breaths with improved pain control throughout the shift  Outcome: Progressing  Goal: Turns in bed with improved pain control throughout the shift  Outcome: Progressing  Goal: Walks with improved pain control throughout the shift  Outcome: Progressing  Goal: Performs ADL's with improved pain control throughout shift  Outcome: Progressing  Goal: Participates in PT with improved pain control throughout the shift  Outcome:  Progressing  Goal: Free from opioid side effects throughout the shift  Outcome: Progressing  Goal: Free from acute confusion related to pain meds throughout the shift  Outcome: Progressing     Problem: Respiratory  Goal: Clear secretions with interventions this shift  Outcome: Progressing  Flowsheets (Taken 1/23/2024 1634 by Peri Martin RN)  Clear secretions with interventions this shift:   Encourage/provide pulmonary hygiene/secretion clearance   Med administration/monitoring of effect  Goal: Minimize anxiety/maximize coping throughout shift  Outcome: Progressing  Flowsheets (Taken 1/28/2024 1325)  Minimize anxiety/maximize coping throughout shift: Monitor pain/anxiety level  Goal: Minimal/no exertional discomfort or dyspnea this shift  Outcome: Progressing  Flowsheets (Taken 1/28/2024 1325)  Minimal/no exertional discomfort or dyspnea this shift: Positioning to promote ventilation/comfort  Goal: No signs of respiratory distress (eg. Use of accessory muscles. Peds grunting)  Outcome: Progressing  Goal: Patent airway maintained this shift  Outcome: Progressing  Goal: Tolerate pulmonary toileting this shift  Outcome: Progressing  Flowsheets (Taken 1/28/2024 1325)  Tolerate pulmonary toileting this shift: Positioning to promote ventilation/comfort  Goal: Verbalize decreased shortness of breath this shift  Outcome: Progressing  Flowsheets (Taken 1/28/2024 1325)  Verbalize decreased shortness of breath this shift: Encourage/provide pulmonary hygiene/secretion clearance  Goal: Wean oxygen to maintain O2 saturation per order/standard this shift  Outcome: Progressing  Flowsheets (Taken 1/28/2024 1325)  Wean oxygen to maintain O2 saturation per order/standard this shift: Encourage activity/mobility  Goal: Increase self care and/or family involvement in next 24 hours  Outcome: Progressing  Flowsheets (Taken 1/28/2024 1325)  Increase self care and/or family involvement in next 24 hours: Encourage activity/mobility      Problem: Diabetes  Goal: Maintain electrolyte levels within acceptable range throughout shift  Outcome: Progressing  Flowsheets (Taken 1/28/2024 1325)  Maintain electrolyte levels within acceptable range throughout shift:   Med administration/monitoring of effect   Monitor urine output  Goal: Maintain glucose levels >70mg/dl to <250mg/dl throughout shift  Outcome: Progressing  Flowsheets (Taken 1/28/2024 1325)  Maintain glucose levels >70mg/dl to <250mg/dl throughout shift: Med administration/monitoring of effect  Goal: No changes in neurological exam by end of shift  Outcome: Progressing  Flowsheets (Taken 1/28/2024 1325)  No changes in neurological exam by end of shift: Complete frequent neurological assessments  Goal: Learn about and adhere to nutrition recommendations by end of shift  Outcome: Progressing  Flowsheets (Taken 1/28/2024 1325)  Learn about and adhere to nutrition recommendations by end of shift: Ensure/encourage compliance with appropriate diet  Goal: Vital signs within normal range for age by end of shift  Outcome: Progressing  Flowsheets (Taken 1/28/2024 1325)  Vital signs within normal range for age by end of shift: Med administration/monitoring of effect  Goal: Increase self care and/or family involovement by end of shift  Outcome: Progressing  Goal: Receive DSME education by end of shift  Outcome: Progressing  Flowsheets (Taken 1/28/2024 1325)  Receive DSME education by end of shift: Provide patient centered education on Diabetic Self Management Education     Problem: Discharge Planning  Goal: Discharge to home or other facility with appropriate resources  Outcome: Progressing  Flowsheets (Taken 1/26/2024 1306)  Discharge to home or other facility with appropriate resources:   Identify barriers to discharge with patient and caregiver   Arrange for needed discharge resources and transportation as appropriate   Identify discharge learning needs (meds, wound care, etc)   Arrange for interpreters  to assist at discharge as needed   Refer to discharge planning if patient needs post-hospital services based on physician order or complex needs related to functional status, cognitive ability or social support system     Problem: Chronic Conditions and Co-morbidities  Goal: Patient's chronic conditions and co-morbidity symptoms are monitored and maintained or improved  Outcome: Progressing  Flowsheets (Taken 1/26/2024 1306)  Care Plan - Patient's Chronic Conditions and Co-Morbidity Symptoms are Monitored and Maintained or Improved:   Monitor and assess patient's chronic conditions and comorbid symptoms for stability, deterioration, or improvement   Collaborate with multidisciplinary team to address chronic and comorbid conditions and prevent exacerbation or deterioration   Update acute care plan with appropriate goals if chronic or comorbid symptoms are exacerbated and prevent overall improvement and discharge

## 2024-01-28 NOTE — NURSING NOTE
Introduced self. Initial assessment done. Patient is alert, oriented. Pt is seen crying  and very emotional. Verbalized that she is very sad about her poor prognosis as she was told by the doctor. Denies any pain, SOB. Provided comfort. Needed things and call light within reach. Safety measures in placed

## 2024-01-28 NOTE — PROGRESS NOTES
[facility]       Date:  1/28/2024  Patient:  Ayanna Gross  YOB: 1950  MRN:  [id number]   Admit Date:  1/19/2024  ========================================================================================================    Chief Complaint   Patient presents with    Shortness of Breath     Patient with SOB and fever. Is very lethargic. Dialysis MWF but did not go today dt SOB and fever         History of Present Illness:  Ayanna Gross is a 73 y.o. year old female patient with Past Medical History of  esenting with anemia, fever worsening sacral and lower extremity wounds and concerns from the nursing facility for left pneumonia. Patient presents to the emergency department found to be febrile she was hypotensive but responded to 500 cc fluid bolus she was found to have a hemoglobin of 6 she was begun and ordered for a single unit of blood transfusion will actually provide 2 units of packed cells additional IV fluids to complete her volume resuscitation given her fever clinical evidence of hypovolemia and infection with an elevated lactate of 2.3 she received a total of approximately 1800 mL of fluid she is DNR/DNI by advanced directives but ICU level care is acceptable her granddaughter questions disorder but it was confirmed and signed by her  who is her POA.  She will be admitted to the intensive care unit given her frail compromised state and high risk of morbidity and mortality necessitating IV antibiotics fluids blood transfusion and ultimately will receive dialysis likely tomorrow wound care consultation to general surgery has been requested as well as consultation with the intensivist infectious disease and nephrology wounds are cultured C. difficile is requested she remains on oral vancomycin for suppression given recent C. difficile infection in December she is initiated on pharmacy dosing of vancomycin and Zosyn 2.25 g IV every 6 hours. She continues on Eliquis  at this time for DVT prophylaxis as well as atrial fibrillation.     Further Hx: Ayanna Gross 73-year-old female with past medical history of atrial fibrillation, end-stage renal disease, C. difficile infection, moderate sized recurrent left pleural effusions, and MRSA bacteremia with mitral valve vegetation admitted to ICU for management of septic shock.  On morning exam patient was on 0.22 of levo just to maintain a MAP of 60 and SBP of 88.  Last documented echocardiogram from [facility] shows EF between 55 to 60% with grade 2 left ventricular diastolic dysfunction, and moderate circumferential pericardial effusion.  Unable to find adequate documentation of who treated her endocarditis, but there was documentation that she was currently still receiving treatment when she was admitted to [facility]. On further chart review Dr. Coulter is her infectious disease doctor. She also had 2 thoracentesis's for moderate to large sided left pleural effusions with cytology showing no malignancy, but unable to differentiate between exudative and transudative at this time.          Interval ICU Events:  1/20: Pt missed last dialysis appointment, no signs of urgent need today, but nephrology Dr. Cabello is following. Pt on high requirements of levophed. She is on broad spectrum abx for coverage currently ID Dr. Coulter following. Pt switches from being A&O x 3 and lethargy. Goal today is to get more central access, david, and thoracentesis.      1/21: Pt more lethargic this AM requiring uptitration of her Levophed to 0.28. Diagnostic and therapeutic thoracentesis yesterday over 1L of hazy straw color fluid. Currently still on vanc, zosyn, and doxy for abx. Potential dialysis this today, but no urgency.      1/22: Pt come in and out of lethargic states throughout the day. Still on high doses of levophed this morning. Per nephrologist Dr. Cabello titrate for an SBP goal of 70 and mentation. Left  Pleural fluid analysis resulted as transudative per lights criteria. Tablo today per nephrology. ID managing with empiric coverage. Source control may not have been met. Podiatry, wound, and surgery to evaluate multiple wounds over the body.      1/23: Pt received dialysis via tablo yesterday. Very similar status as yesterday from lethargic states to well mentation. Patient remains on Levophed still at 0.36 with empiric coverage with vanc + zosyn & doxy. Increase in patients total bilirubin today 0.9-1.6. Consulting services include: Wound, Vascular, Podiatry, Acute surgery, Palliative, Infectious Disease, and Nephrology.      1/24: Patient stable overnight, norepinephrine weaned down to 0.16 with stable pressures and new goal of systolics > 70     1/25: Patient resting comfortably in bed this morning requiring 0.05 of Levophed for systolic goal of 70. She is more alert than prior and complains of less pain. She will receive Tablo today with 1.5L of fluid removed per Nephrology.      1/26: Patient resting comfortably in bed this morning requiring 0.06 Levophed and midodrine 15 mg TID. Tablo yesterday with 1.5L removed. Empiric abx stopped 2 days ago. SLP signed off with recommendations.      1/27: Patient resting comfortable in bed this morning requiring 0.06 of levophed and midodrine 15 mg TID. Potential Tablo today. No acute events overnight. Patient remains at baseline.     Medical History:  Past Medical History:   Diagnosis Date    Asthma     CAD (coronary artery disease)     CHF (congestive heart failure) (CMS/Formerly [facility])     Chronic kidney disease on chronic dialysis (CMS/Formerly [facility])     Hypertension     Personal history of diseases of the blood and blood-forming organs and certain disorders involving the immune mechanism     History of autoimmune disorder    Sleep apnea     Type 2 diabetes mellitus without complications (CMS/Formerly [facility]) 09/15/2022    Diabetes mellitus     Past Surgical History:   Procedure Laterality Date     CARPAL TUNNEL RELEASE  11/18/2013    Neuroplasty Decompression Median Nerve At Carpal Tunnel    HAND SURGERY  11/18/2013    Hand Surgery                                                                                                                                                          MASTECTOMY, PARTIAL  04/10/2014    Right Breast Partial Mastectomy    MR HEAD ANGIO WO IV CONTRAST  8/29/2023    MR HEAD ANGIO WO IV CONTRAST [facility]    OTHER SURGICAL HISTORY  11/18/2013    Breast Reconstruction With Implant Prosthesis    OTHER SURGICAL HISTORY  11/18/2013    Thyroid Surgery Substernal Thyroidectomy Partial    OTHER SURGICAL HISTORY  11/18/2013    Modified Radical Mastectomy Left Breast    OTHER SURGICAL HISTORY  04/04/2022    Carpal tunnel surgery    OTHER SURGICAL HISTORY  04/04/2022    Foot surgery    OTHER SURGICAL HISTORY  04/04/2022    Hernia repair    SENTINEL LYMPH NODE BIOPSY  04/10/2014    [place] Lymph Node Biopsy    TONSILLECTOMY  11/18/2013    Tonsillectomy    UMBILICAL HERNIA REPAIR  11/18/2013    Umbilical Hernia Repair    US GUIDED PERCUTANEOUS PLACEMENT  6/29/2023    US GUIDED PERCUTANEOUS PLACEMENT [facility]     Medications Prior to Admission   Medication Sig Dispense Refill Last Dose    apixaban (Eliquis) 2.5 mg tablet Take 1 tablet (2.5 mg) by mouth 2 times a day.       blood sugar diagnostic strip 1 x daily e11.65 for 90 days       blood-glucose sensor (Dexcom G6 Sensor) device Check blood sugar when needed and as instructed 6 each 3     doxycycline (Vibramycin) 100 mg capsule Take 1 capsule (100 mg) by mouth once daily. Take with at least 8 ounces (large glass) of water, do not lie down for 30 minutes after Do not start before January 6, 2024.       escitalopram (Lexapro) 10 mg tablet 1 tablet Orally Once a day for 30 day(s)       febuxostat (Uloric) 40 mg tablet TAKE ONE TABLET BY MOUTH EVERY OTHER DAY       fenofibrate (Triglide) 160 mg tablet Take 1 tablet (160 mg)  by mouth once daily.       gabapentin (Neurontin) 100 mg capsule Take 1 capsule (100 mg) by mouth 2 times a day.       insulin lispro (HumaLOG) 100 unit/mL injection Inject 0-0.1 mL (0-10 Units) under the skin 3 times a day with meals. Take as directed per insulin instructions.       midodrine (Proamatine) 5 mg tablet Take 3 tablets (15 mg) by mouth 3 times a day with meals.       nystatin (Mycostatin) 100,000 unit/gram powder APPLY 1 GRAM TO SKIN TWO TIMES A DAY 30 g 0     oxyCODONE-acetaminophen (Percocet) 5-325 mg tablet Take 1 tablet by mouth every 12 hours if needed for severe pain (7 - 10). 120 tablet 0     simvastatin (Zocor) 20 mg tablet TAKE 1 TABLET BY MOUTH DAILY 60 tablet 5      Patient has no known allergies.  Social History     Tobacco Use    Smoking status: Never    Smokeless tobacco: Never   Substance Use Topics    Alcohol use: Not Currently    Drug use: Never     Family History   Problem Relation Name Age of Onset    Lymphoma Mother      Prostate cancer Father          passed away [address] Medications:    norepinephrine, 0.01-3 mcg/kg/min, Last Rate: 0.04 mcg/kg/min (01/28/24 0700)  vasopressin, 0.03 Units/min, Last Rate: Stopped (01/26/24 1315)          Current Facility-Administered Medications:     acetaminophen (Tylenol) tablet 650 mg, 650 mg, oral, TID, Yusuf De La Garza PA-C, 650 mg at 01/27/24 2110    apixaban (Eliquis) tablet 2.5 mg, 2.5 mg, oral, BID, Judd Tavera DO, 2.5 mg at 01/27/24 2110    dextrose 10 % in water (D10W) infusion, 0.3 g/kg/hr, intravenous, Once PRN, Judd Tavera DO    dextrose 50 % injection 25 g, 25 g, intravenous, q15 min PRN, Judd Tavera DO    doxycycline (Vibramycin) capsule 100 mg, 100 mg, oral, Daily, Yusuf De La Garza PA-C, 100 mg at 01/27/24 1456    escitalopram (Lexapro) tablet 10 mg, 10 mg, oral, Nightly, Judd Tavera DO, 10 mg at 01/27/24 2110    gabapentin (Neurontin) capsule 100 mg, 100 mg, oral, BID,  Judd Tavera DO, 100 mg at 01/27/24 2110    glucagon (Glucagen) injection 1 mg, 1 mg, intramuscular, q15 min PRN, Judd Tavera DO    insulin lispro (HumaLOG) injection 0-10 Units, 0-10 Units, subcutaneous, TID with meals, Judd Tavera DO, 2 Units at 01/27/24 1722    ipratropium-albuteroL (Duo-Neb) 0.5-2.5 mg/3 mL nebulizer solution 3 mL, 3 mL, nebulization, q8h, LILLIE Moran, 3 mL at 01/28/24 0749    midodrine (Proamatine) tablet 15 mg, 15 mg, oral, TID with meals, Judd Tavera DO, 15 mg at 01/27/24 1722    norepinephrine (Levophed) 8 mg in dextrose 5% 250 mL (0.032 mg/mL) infusion (premix), 0.01-3 mcg/kg/min, intravenous, Continuous, Yusuf De La Garza PA-C, Last Rate: 5.57 mL/hr at 01/28/24 0700, 0.04 mcg/kg/min at 01/28/24 0700    nystatin (Mycostatin) 100,000 unit/gram powder, , Topical, BID, Judd Tavera DO, Given at 01/27/24 2110    nystatin (Mycostatin) cream, , Topical, BID, LILLIE Blair, Given at 01/27/24 2110    oxygen (O2) therapy, , inhalation, Continuous PRN - O2/gases, Judd Tavera DO, 4 L/min at 01/23/24 1600    pantoprazole (ProtoNix) EC tablet 40 mg, 40 mg, oral, Daily, 40 mg at 01/27/24 0826 **OR** pantoprazole (ProtoNix) injection 40 mg, 40 mg, intravenous, Daily, Yusuf De La Garza PA-C, 40 mg at 01/21/24 0824    sennosides-docusate sodium (Judy-Colace) 8.6-50 mg per tablet 1 tablet, 1 tablet, oral, Nightly PRN, Yusuf De La Garza, PA-C    sodium chloride 3 % nebulizer solution 3 mL, 3 mL, nebulization, TID, Charly Grossman MD, 3 mL at 01/27/24 2026    vancomycin (Vancocin) capsule 125 mg, 125 mg, oral, Daily, Katlyn Alexander APRN-CNP, 125 mg at 01/27/24 0826    vasopressin (Vasostrict) 0.2 unit/mL infusion, 0.03 Units/min, intravenous, Continuous, Dhiraj De La Garza MD, Stopped at 01/26/24 1315    zinc oxide 20 % ointment 1 Application, 1 Application, Topical, BID, Nickolas Gallego APRN-CNP, 1 Application at 01/27/24 2110    Review of  Systems:  14 point review of systems was completed and negative except for those specially mention in my HPI    Physical Exam:    Heart Rate:  []   Temp:  [36.3 °C (97.3 °F)-36.6 °C (97.9 °F)]   Resp:  [12-29]   Weight:  [87.4 kg (192 lb 10.9 oz)-87.7 kg (193 lb 5.5 oz)]   SpO2:  [86 %-100 %]     Physical Exam  Vitals and nursing note reviewed.   Constitutional:       Appearance: Normal appearance. She is ill-appearing.   HENT:      Head: Normocephalic and atraumatic.      Nose: Nose normal. No congestion.      Mouth/Throat:      Mouth: Mucous membranes are moist.      Pharynx: Oropharynx is clear. No oropharyngeal exudate.   Eyes:      General: No scleral icterus.     Extraocular Movements: Extraocular movements intact.      Conjunctiva/sclera: Conjunctivae normal.      Pupils: Pupils are equal, round, and reactive to light.   Neck:      Vascular: No carotid bruit.   Cardiovascular:      Rate and Rhythm: Normal rate and regular rhythm.      Heart sounds:      No friction rub. No gallop.   Pulmonary:      Effort: Pulmonary effort is normal.      Breath sounds: Wheezing and rales present.   Abdominal:      General: Bowel sounds are normal.      Palpations: Abdomen is soft.      Tenderness: There is no abdominal tenderness. There is no guarding or rebound.   Musculoskeletal:         General: Swelling present.      Cervical back: Normal range of motion and neck supple.      Right lower leg: Edema present.      Left lower leg: Edema present.   Lymphadenopathy:      Cervical: No cervical adenopathy.   Skin:     General: Skin is dry.      Capillary Refill: Capillary refill takes more than 3 seconds.      Findings: Bruising, erythema, lesion and rash present.      Comments: Ulcers on bilateral heels and left shin  Ulcer on sacrum  Skin tear to Left hand  Edema to bilateral uppers but R>L      Neurological:      General: No focal deficit present.      Mental Status: She is alert. Mental status is at baseline.       Cranial Nerves: No cranial nerve deficit.      Motor: Weakness present.         Objective:    I have reviewed all medications, laboratory results, and imaging pertinent for today's encounter.    Intake/Output Summary (Last 24 hours) at 1/28/2024 0822  Last data filed at 1/28/2024 0700  Gross per 24 hour   Intake 759.99 ml   Output 525 ml   Net 234.99 ml       Daily Labs:  CBC:   Results from last 7 days   Lab Units 01/28/24  0322   WBC AUTO x10*3/uL 14.6*   HEMOGLOBIN g/dL 9.4*   HEMATOCRIT % 29.2*   MCV fL 92     BMP:    Results from last 7 days   Lab Units 01/28/24  0322   SODIUM mmol/L 134   POTASSIUM mmol/L 4.0   CHLORIDE mmol/L 99   CO2 mmol/L 22*   BUN mg/dL 9   CREATININE mg/dL 2.50*   CALCIUM mg/dL 8.4*   GLUCOSE mg/dL 100*   MAGNESIUM mg/dL 1.70           Assessment/Plan:  I am currently managing this critically ill patient for the following problems:  Septic Shock   ESRD   Chronic Hypotension   Recurrent Pleural Effusions   Vasculopathy   Multiple Wounds with and with out infections  C. Diff infection  Acute Metabolic Encephalopathy     Neuro/Psych/Pain Ctrl/Sedation:  Acute Metabolic Encephalopathy  -Pain Control: Tylenol for mild  -CAM Assessment every shift, neuro assessments q4   -Palliative consulted   -restarted Lexapro and gabapentin  -CT head No acute finding   -Will continue to monitor for acute encephalitis, currently appears to be clearing with less periods of confusion     Respiratory/ENT:  Ct chest showed multiple nodules on lung in August 23   Moderate to large left sided pleural effusion   -Currently on NC 2L, patient reports breathing easier and productive cough  -Thoracentesis 1/20 over 1L of hazy straw colored fluid, Lights criteria suggest Transudative    -SP02 >92%, Nasal canula titration   -CT C/A/P A left lower lobe pneumonia is identified. There is extensive airspace consolidation  -continuous pulse ox  -Pulm hygiene     Cardiovascular:  Hx of endocarditis   Septic Shock  Chronic  Hypotension  Vasculopathy   -Levophed titrate to maintain SBP > 70, plan to wean levo off when able  -Levo weaned down today with overall picture towards improvement of septic state  -Home midodrine 15 mg TID when mental status is appropriate   -Continuous cardiac monitoring     GI:  #Suspected [place] syndrome  -Regular diet assess mental status before feeding   -Flexacil for stools   -BR with senna/colace PRN   -Protonix daily   -Slightly uptrending bilirubin with no signs of jaundice or hepatic failure, Hepatic panel scheduled for am draw        Renal/Volume Status (Intra & Extravascular):  ESRD   Metabolic lactic acidosis-resolved  -Nephrology following for dialysis needs  -Elevated Lactate 2/2 hypoperfusion -resolved  -Conservative fluid management, will trial 250ml of 5% albumin to test intravascular volume status  -Daily BMP, Replete electrolytes as indicated for ESRD    -Continue Uloric for gout prophylaxis   -Tablo today     Endocrine  DM2   -SSI Q4h     Infectious Disease:  Hx of MRSA bacteremia with endocarditis  Multiple Wounds with and with out infections  C. Diff infection  -WBC 22.9 -> 21.0  -> 21.2 -> 16.5-> 13.7 -> 12.0   -Per ID add doxycycline 100mg every 24 hours   -blood cultures No growth day 3  -Urine cultures grew Proteus, not suspected as source of sepsis especially in setting of anuresis    -ID Consulted, Appreciate ID consult  -C. Diff & MRSA PCR negative      Heme/Onc:  Anemia of chronic disease   -monitor for s/s anemia  -transfuse for hgb <7  -daily CBC  -Duplex upper extremity US negative for UE DVT     OBGYN/MSK:  Multiple Wounds with and with out infections  -Surgery consulted   -Wound care consulted for dressing other wounds   -Podiatry consulted  -XR Left ankle not suspicious for osteomyelitis   -XR Right ankle and tib/fib not suspicious for osteomyelitis   -padded pressure points  -ICU skin protocol     Ethics/Code Status:  Full code     :  DVT Prophylaxis:  Eliquis  GI Prophylaxis: PPI  Bowel Regimen: Senna  Diet: Regular  CVC: R internal Jugular  [place]: R radial  Amos: None  Restraints: None  Dispo: Will remain in ICU     This note was prepared using voice recognition software. The details of this note are correct and have been reviewed, and corrected to the best of my ability. Some grammatical areas may persist related to the Dragon software.      I have reviewed all medications, laboratory results, and imaging pertinent for today's encounter.  Plan Discussed with Dr. Grossman  Critical Care Time: 35 minutes  Critical Care Chana De La Garza PA-C

## 2024-01-28 NOTE — CARE PLAN
Problem: Skin  Goal: Decreased wound size/increased tissue granulation at next dressing change  Outcome: Progressing  Flowsheets (Taken 1/27/2024 2225)  Decreased wound size/increased tissue granulation at next dressing change:   Promote sleep for wound healing   Utilize specialty bed per algorithm   Protective dressings over bony prominences  Goal: Participates in plan/prevention/treatment measures  Outcome: Progressing  Flowsheets (Taken 1/27/2024 2225)  Participates in plan/prevention/treatment measures: Discuss with provider PT/OT consult  Goal: Prevent/manage excess moisture  Outcome: Progressing  Flowsheets (Taken 1/27/2024 2225)  Prevent/manage excess moisture:   Moisturize dry skin   Monitor for/manage infection if present   Follow provider orders for dressing changes  Goal: Prevent/minimize sheer/friction injuries  Outcome: Progressing  Flowsheets (Taken 1/27/2024 2225)  Prevent/minimize sheer/friction injuries:   HOB 30 degrees or less   Increase activity/out of bed for meals   Turn/reposition every 2 hours/use positioning/transfer devices   Use pull sheet   Utilize specialty bed per algorithm  Goal: Promote/optimize nutrition  Outcome: Progressing  Flowsheets (Taken 1/27/2024 2225)  Promote/optimize nutrition:   Offer water/supplements/favorite foods   Monitor/record intake including meals  Goal: Promote skin healing  Outcome: Progressing  Flowsheets (Taken 1/27/2024 2225)  Promote skin healing:   Assess skin/pad under line(s)/device(s)   Ensure correct size (line/device) and apply per  instructions   Protective dressings over bony prominences   Rotate device position/do not position patient on device   Turn/reposition every 2 hours/use positioning/transfer devices   The patient's goals for the shift include     The clinical goals for the shift include wean off pressors    Over the shift, the patient did not make progress toward the following goals. Barriers to progression include .  Recommendations to address these barriers include .

## 2024-01-29 NOTE — PROGRESS NOTES
Patient not medically clear. Patient remains in the ICU on pressors. Patient receiving dialysis on this day. At this time there is not a safe discharge plan in place. Patient admitted from [facility]. Patient's family thinking that they want patient to go to [facility] at discharge. Referral has not been sent. Patient will need therapy evaluation. Will follow.      **PATIENT DOES NOT HAVE A SAFE DISCHARGE PLAN    Shauna Jensen RN

## 2024-01-29 NOTE — CARE PLAN
The patient's goals for the shift include unable to state    The clinical goals for the shift include keep SBP 70-80    Over the shift, the patient did not make progress toward the following goals. Barriers to progression include . Recommendations to address these barriers include .

## 2024-01-29 NOTE — PROGRESS NOTES
[facility] Critical Care Medicine       Date:  1/29/2024  Patient:  Ayanna Gross  YOB: 1950  MRN:  [id number]   Admit Date:  1/19/2024  ========================================================================================================    Chief Complaint   Patient presents with    Shortness of Breath     Patient with SOB and fever. Is very lethargic. Dialysis MWF but did not go today dt SOB and fever         History of Present Illness:  Ayanna Gross is a 73 y.o. year old female patient with Past Medical History of  esenting with anemia, fever worsening sacral and lower extremity wounds and concerns from the nursing facility for left pneumonia. Patient presents to the emergency department found to be febrile she was hypotensive but responded to 500 cc fluid bolus she was found to have a hemoglobin of 6 she was begun and ordered for a single unit of blood transfusion will actually provide 2 units of packed cells additional IV fluids to complete her volume resuscitation given her fever clinical evidence of hypovolemia and infection with an elevated lactate of 2.3 she received a total of approximately 1800 mL of fluid she is DNR/DNI by advanced directives but ICU level care is acceptable her granddaughter questions disorder but it was confirmed and signed by her  who is her POA.  She will be admitted to the intensive care unit given her frail compromised state and high risk of morbidity and mortality necessitating IV antibiotics fluids blood transfusion and ultimately will receive dialysis likely tomorrow wound care consultation to general surgery has been requested as well as consultation with the intensivist infectious disease and nephrology wounds are cultured C. difficile is requested she remains on oral vancomycin for suppression given recent C. difficile infection in December she is initiated on pharmacy dosing of vancomycin and Zosyn 2.25 g IV every 6 hours. She continues on Eliquis  at this time for DVT prophylaxis as well as atrial fibrillation.     Further Hx: Ayanna Gross 73-year-old female with past medical history of atrial fibrillation, end-stage renal disease, C. difficile infection, moderate sized recurrent left pleural effusions, and MRSA bacteremia with mitral valve vegetation admitted to ICU for management of septic shock.  On morning exam patient was on 0.22 of levo just to maintain a MAP of 60 and SBP of 88.  Last documented echocardiogram from [facility] shows EF between 55 to 60% with grade 2 left ventricular diastolic dysfunction, and moderate circumferential pericardial effusion.  Unable to find adequate documentation of who treated her endocarditis, but there was documentation that she was currently still receiving treatment when she was admitted to [facility]. On further chart review Dr. Coulter is her infectious disease doctor. She also had 2 thoracentesis's for moderate to large sided left pleural effusions with cytology showing no malignancy, but unable to differentiate between exudative and transudative at this time.        Interval ICU Events:  1/20: Pt missed last dialysis appointment, no signs of urgent need today, but nephrology Dr. Cabello is following. Pt on high requirements of levophed. She is on broad spectrum abx for coverage currently ID Dr. Coulter following. Pt switches from being A&O x 3 and lethargy. Goal today is to get more central access, david, and thoracentesis.      1/21: Pt more lethargic this AM requiring uptitration of her Levophed to 0.28. Diagnostic and therapeutic thoracentesis yesterday over 1L of hazy straw color fluid. Currently still on vanc, zosyn, and doxy for abx. Potential dialysis this today, but no urgency.      1/22: Pt come in and out of lethargic states throughout the day. Still on high doses of levophed this morning. Per nephrologist Dr. Cabello titrate for an SBP goal of 70 and mentation. Left Pleural  fluid analysis resulted as transudative per lights criteria. Tablo today per nephrology. ID managing with empiric coverage. Source control may not have been met. Podiatry, wound, and surgery to evaluate multiple wounds over the body.      1/23: Pt received dialysis via tablo yesterday. Very similar status as yesterday from lethargic states to well mentation. Patient remains on Levophed still at 0.36 with empiric coverage with vanc + zosyn & doxy. Increase in patients total bilirubin today 0.9-1.6. Consulting services include: Wound, Vascular, Podiatry, Acute surgery, Palliative, Infectious Disease, and Nephrology.      1/24: Patient stable overnight, norepinephrine weaned down to 0.16 with stable pressures and new goal of systolics > 70     1/25: Patient resting comfortably in bed this morning requiring 0.05 of Levophed for systolic goal of 70. She is more alert than prior and complains of less pain. She will receive Tablo today with 1.5L of fluid removed per Nephrology.      1/26: Patient resting comfortably in bed this morning requiring 0.06 Levophed and midodrine 15 mg TID. Tablo yesterday with 1.5L removed. Empiric abx stopped 2 days ago. SLP signed off with recommendations.       1/27: Patient resting comfortable in bed this morning requiring 0.06 of levophed and midodrine 15 mg TID. Potential Tablo today. No acute events overnight. Patient remains at baseline.     1/29: No acute events overnight.  Pt resting comfortable in bed this morning requiring 0.04 of levophed and midodrine 15mg TID. Plan for dialysis via tablo today per nephrology.      Medical History:  Past Medical History:   Diagnosis Date    Asthma     CAD (coronary artery disease)     CHF (congestive heart failure) (CMS/HCC)     Chronic kidney disease on chronic dialysis (CMS/[facility])     Hypertension     Personal history of diseases of the blood and blood-forming organs and certain disorders involving the immune mechanism     History of autoimmune  disorder    Sleep apnea     Type 2 diabetes mellitus without complications (CMS/[facility]) 09/15/2022    Diabetes mellitus     Past Surgical History:   Procedure Laterality Date    CARPAL TUNNEL RELEASE  11/18/2013    Neuroplasty Decompression Median Nerve At Carpal Tunnel    HAND SURGERY  11/18/2013    Hand Surgery                                                                                                                                                          MASTECTOMY, PARTIAL  04/10/2014    Right Breast Partial Mastectomy    MR HEAD ANGIO WO IV CONTRAST  8/29/2023    MR HEAD ANGIO WO IV CONTRAST [facility]    OTHER SURGICAL HISTORY  11/18/2013    Breast Reconstruction With Implant Prosthesis    OTHER SURGICAL HISTORY  11/18/2013    Thyroid Surgery Substernal Thyroidectomy Partial    OTHER SURGICAL HISTORY  11/18/2013    Modified Radical Mastectomy Left Breast    OTHER SURGICAL HISTORY  04/04/2022    Carpal tunnel surgery    OTHER SURGICAL HISTORY  04/04/2022    Foot surgery    OTHER SURGICAL HISTORY  04/04/2022    Hernia repair    SENTINEL LYMPH NODE BIOPSY  04/10/2014    [place] Lymph Node Biopsy    TONSILLECTOMY  11/18/2013    Tonsillectomy    UMBILICAL HERNIA REPAIR  11/18/2013    Umbilical Hernia Repair    US GUIDED PERCUTANEOUS PLACEMENT  6/29/2023    US GUIDED PERCUTANEOUS PLACEMENT [facility]     Medications Prior to Admission   Medication Sig Dispense Refill Last Dose    apixaban (Eliquis) 2.5 mg tablet Take 1 tablet (2.5 mg) by mouth 2 times a day.       blood sugar diagnostic strip 1 x daily e11.65 for 90 days       blood-glucose sensor (Dexcom G6 Sensor) device Check blood sugar when needed and as instructed 6 each 3     doxycycline (Vibramycin) 100 mg capsule Take 1 capsule (100 mg) by mouth once daily. Take with at least 8 ounces (large glass) of water, do not lie down for 30 minutes after Do not start before January 6, 2024.       escitalopram (Lexapro) 10 mg tablet 1 tablet  Orally Once a day for 30 day(s)       febuxostat (Uloric) 40 mg tablet TAKE ONE TABLET BY MOUTH EVERY OTHER DAY       fenofibrate (Triglide) 160 mg tablet Take 1 tablet (160 mg) by mouth once daily.       gabapentin (Neurontin) 100 mg capsule Take 1 capsule (100 mg) by mouth 2 times a day.       insulin lispro (HumaLOG) 100 unit/mL injection Inject 0-0.1 mL (0-10 Units) under the skin 3 times a day with meals. Take as directed per insulin instructions.       midodrine (Proamatine) 5 mg tablet Take 3 tablets (15 mg) by mouth 3 times a day with meals.       nystatin (Mycostatin) 100,000 unit/gram powder APPLY 1 GRAM TO SKIN TWO TIMES A DAY 30 g 0     oxyCODONE-acetaminophen (Percocet) 5-325 mg tablet Take 1 tablet by mouth every 12 hours if needed for severe pain (7 - 10). 120 tablet 0     simvastatin (Zocor) 20 mg tablet TAKE 1 TABLET BY MOUTH DAILY 60 tablet 5      Patient has no known allergies.  Social History     Tobacco Use    Smoking status: Never    Smokeless tobacco: Never   Substance Use Topics    Alcohol use: Not Currently    Drug use: Never     Family History   Problem Relation Name Age of Onset    Lymphoma Mother      Prostate cancer Father          passed away fabiana min 2017       Review of Systems:  14 point review of systems was completed and negative except for those specially mention in my HPI    Physical Exam:    Physical Exam  Constitutional:       Appearance: She is ill-appearing.   HENT:      Head: Normocephalic.      Mouth/Throat:      Mouth: Mucous membranes are moist.      Pharynx: Oropharynx is clear.   Eyes:      Extraocular Movements: Extraocular movements intact.      Pupils: Pupils are equal, round, and reactive to light.   Cardiovascular:      Rate and Rhythm: Normal rate and regular rhythm.      Pulses: Normal pulses.      Heart sounds: Normal heart sounds.   Pulmonary:      Effort: Pulmonary effort is normal.      Breath sounds: Wheezing and rales present.   Abdominal:       General: Bowel sounds are normal.      Palpations: Abdomen is soft.   Musculoskeletal:         General: Swelling present.      Cervical back: Normal range of motion.      Right lower leg: Edema present.      Left lower leg: Edema present.   Skin:     General: Skin is warm.      Capillary Refill: Capillary refill takes more than 3 seconds.      Findings: Bruising and erythema present.      Comments: Ulcers on bilateral heels and left shin.  Ulcer on sacrum.  Skin tear to Left hand.  Edema to bilateral uppers but R>L.      Neurological:      Mental Status: She is alert and oriented to person, place, and time.      Motor: Weakness present.   Psychiatric:         Mood and Affect: Mood normal.         Behavior: Behavior normal.         Vitals:  Most recent :  Vitals:    01/29/24 0755   BP:    Pulse:    Resp:    Temp:    SpO2: 97%       24hr Min/Max:  Temp  Min: 36 °C (96.8 °F)  Max: 36.6 °C (97.9 °F)  Pulse  Min: 0  Max: 108  Resp  Min: 10  Max: 27  SpO2  Min: 84 %  Max: 100 %      Objective:    Scheduled Medications:  acetaminophen, 650 mg, oral, TID  apixaban, 2.5 mg, oral, BID  doxycylcine, 100 mg, oral, Daily  escitalopram, 10 mg, oral, Nightly  gabapentin, 100 mg, oral, BID  insulin lispro, 0-10 Units, subcutaneous, TID with meals  ipratropium-albuteroL, 3 mL, nebulization, q8h  midodrine, 15 mg, oral, TID with meals  nystatin, , Topical, BID  nystatin, , Topical, BID  pantoprazole, 40 mg, oral, Daily   Or  pantoprazole, 40 mg, intravenous, Daily  sodium chloride, 3 mL, nebulization, TID  vancomycin, 125 mg, oral, Daily  zinc oxide, 1 Application, Topical, BID        Continuous Medications:  norepinephrine, 0.01-3 mcg/kg/min, Last Rate: 0.03 mcg/kg/min (01/29/24 6763)  vasopressin, 0.03 Units/min, Last Rate: Stopped (01/26/24 1315)        PRN Medications:  PRN medications: dextrose 10 % in water (D10W), dextrose, glucagon, oxygen, sennosides-docusate sodium    Labs/Radiology/Diagnostic Review:  Results for orders  placed or performed during the hospital encounter of 01/19/24 (from the past 24 hour(s))   POCT GLUCOSE   Result Value Ref Range    POCT Glucose 103 (H) 74 - 99 mg/dL   POCT GLUCOSE   Result Value Ref Range    POCT Glucose 119 (H) 74 - 99 mg/dL   POCT GLUCOSE   Result Value Ref Range    POCT Glucose 132 (H) 74 - 99 mg/dL   POCT GLUCOSE   Result Value Ref Range    POCT Glucose 157 (H) 74 - 99 mg/dL   Phosphorus   Result Value Ref Range    Phosphorus 3.1 2.5 - 4.5 mg/dL   CBC   Result Value Ref Range    WBC 16.2 (H) 4.4 - 11.3 x10*3/uL    nRBC 0.0 0.0 - 0.0 /100 WBCs    RBC 3.20 (L) 4.00 - 5.20 x10*6/uL    Hemoglobin 9.4 (L) 12.0 - 16.0 g/dL    Hematocrit 29.7 (L) 36.0 - 46.0 %    MCV 93 80 - 100 fL    MCH 29.4 26.0 - 34.0 pg    MCHC 31.6 (L) 32.0 - 36.0 g/dL    RDW 24.7 (H) 11.5 - 14.5 %    Platelets 141 (L) 150 - 450 x10*3/uL   Comprehensive metabolic panel   Result Value Ref Range    Glucose 150 (H) 65 - 99 mg/dL    Sodium 131 (L) 133 - 145 mmol/L    Potassium 3.7 3.4 - 5.1 mmol/L    Chloride 97 97 - 107 mmol/L    Bicarbonate 19 (L) 24 - 31 mmol/L    Urea Nitrogen 12 8 - 25 mg/dL    Creatinine 3.20 (H) 0.40 - 1.60 mg/dL    eGFR 15 (L) >60 mL/min/1.73m*2    Calcium 8.6 8.5 - 10.4 mg/dL    Albumin 2.6 (L) 3.5 - 5.0 g/dL    Alkaline Phosphatase 183 (H) 35 - 125 U/L    Total Protein 4.8 (L) 5.9 - 7.9 g/dL    AST 34 5 - 40 U/L    Bilirubin, Total 1.7 (H) 0.1 - 1.2 mg/dL    ALT 14 5 - 40 U/L    Anion Gap 15 <=19 mmol/L   Magnesium   Result Value Ref Range    Magnesium 1.70 1.60 - 3.10 mg/dL   POCT GLUCOSE   Result Value Ref Range    POCT Glucose 143 (H) 74 - 99 mg/dL      Upper extremity venous duplex bilateral    Result Date: 1/25/2024           [place] [id number]            Phone [phone]  Vascular Lab Report  [facility] US UPPER EXTREMITY VENOUS DUPLEX BILATERAL Patient Name:      BASIA Gray Physician:  [id number] Busby                                                             MD, ALICE Study Date:        1/25/2024           Ordering Provider:  [id number] ERI PINO CULLEN MRN/PID:           [id number]            Fellow: Accession#:        [id number]        Technologist:       Dara Parr RVRICHIE Date of Birth/Age: 1950 / 73 years Technologist 2: Gender:            F                   Encounter#:         [id number] Admission Status:  Inpatient           Location Performed: [facility]  Diagnosis/ICD: Right arm swelling-M79.89; Left arm swelling-M79.89 CPT Codes:     80643 Peripheral venous duplex scan for DVT complete  CONCLUSIONS:  Right Upper Venous: No evidence of acute deep vein thrombus visualized in the right upper extremity. Internal jugular vein was visualized in segments due to IV lines and bandages. Left Upper Venous: No evidence of acute deep vein thrombus visualized in the left upper extremity. Subclavian stent is noted and appears patent. There is a known occluded dialysis access noted.  Additional Findings: Technically difficult exam due to IV lines, bandages, and patient's positioning.  Imaging & Doppler Findings:  Right               Compressible Thrombus        Flow Internal Jugular        Yes        None   Spontaneous/Phasic Subclavian              Yes        None Subclavian Proximal     Yes        None   Spontaneous/Phasic Subclavian Mid          Yes        None Subclavian Distal       Yes        None   Spontaneous/Phasic Axillary                Yes        None       Pulsatile Brachial                Yes        None Cephalic                Yes        None Basilic                 Yes        None  Left                Compress Thrombus        Flow Internal Jugular      Yes      None       Pulsatile Subclavian            Yes      None Subclavian Proximal   Yes      None       Pulsatile Subclavian Mid        Yes      None       Pulsatile Subclavian Distal     Yes      None       Pulsatile Axillary              Yes      None   Spontaneous/Phasic  Brachial              Yes      None Cephalic              Yes      None Basilic               Yes      None  [id number] Mini Bubsy MD, ALICE Electronically signed by [id number] ALICE Kelly MD on 1/25/2024 at 4:06:13 PM  ** Final **     ECG 12 lead    Result Date: 1/24/2024  Sinus tachycardia  with 1st degree AV block Right bundle branch block Possible Lateral infarct , age undetermined Abnormal ECG Confirmed by Yesika Nj ([id number]) on 1/24/2024 6:54:45 AM    XR tibia fibula right 2 views    Result Date: 1/22/2024  Interpreted By:  Real Mendieta, STUDY: XR TIBIA FIBULA RIGHT 2 VIEWS; 1/22/2024 2:15 pm   INDICATION: Signs/Symptoms:evaluate source of infection, osteomyelitis;   COMPARISON: None available.   ACCESSION NUMBER(S): [id number]   ORDERING CLINICIAN: BAN GIFFORD   TECHNIQUE: Views: AP and Lateral of the right tibia and fibula   FINDINGS: RESULT: There is no evidence for fracture or dislocation. Tricompartmental degenerative changes of the right knee. The tibia and fibula appear intact without bony erosion or evidence for osteomyelitis. No other bony or soft tissue abnormality is identified. No subcutaneous gas is noted.       No evidence for acute osseous abnormality. No bony erosion to suggest osteomyelitis.   Signed by: Real Mendieta 1/22/2024 3:15 PM Dictation workstation:   [id number]    XR ankle right 3+ views    Result Date: 1/22/2024  Interpreted By:  Real Mendieta, STUDY: XR ANKLE RIGHT 3+ VIEWS; 1/22/2024 2:15 pm   INDICATION: Signs/Symptoms:Evuluate source of infection, osteomyelitis;   COMPARISON: None available.   ACCESSION NUMBER(S): [id number]   ORDERING CLINICIAN: BAN GIFFORD   TECHNIQUE: Views: AP, Lateral, Oblique, right ankle   FINDINGS: RESULT: There is no evidence for fracture or dislocation. The ankle mortise is intact. Joint spaces appear adequately maintained. No bony erosion to suggest osteomyelitis. No bone lesion or soft tissue abnormality is identified. No  subcutaneous gas is seen.       No evidence for acute osseous abnormality. No bony erosion to suggest osteomyelitis.   Signed by: Real Mendieta 1/22/2024 3:14 PM Dictation workstation:   [id number]    Transthoracic Echo (TTE) Complete    Result Date: 1/22/2024           [place] [id number]            Phone [phone] TRANSTHORACIC ECHOCARDIOGRAM REPORT  Patient Name:      ABSIA VEGA      Reading Physician:   [id number] [facility] Study Date:        1/22/2024           Ordering Provider:   [id number] ERI BERMAN MRN/PID:           [id number]            Fellow: Accession#:        [id number]        Nurse: Date of Birth/Age: 1950 / 73 years Sonographer:         Tabatha Page RDCS Gender:            F                   Additional Staff: Height:            157.00 cm           Admit Date: Weight:            75.00 kg            Admission Status:    Inpatient - Routine BSA:               1.76 m2             Department Location: [facility] Blood Pressure: 88 /49 mmHg Study Type:    TRANSTHORACIC ECHO (TTE) COMPLETE Diagnosis/ICD: Chronic combined systolic (congestive) and diastolic (congestive)                heart failure (CHF)-I50.42; Sepsis, unspecified organism-A41.9 Indication:    heart failure,septic shock CPT Codes:     Echo Complete w Full Doppler-[id number] Patient History: BMI:               Obese >30 Pertinent History: Sepsis, PVD, A-Fib, CVA, Cancer and HTN. breast                    prosthesis/ca, ESRD,anemia,septic shock,heart failure. Study Detail: The following Echo studies were performed: 2D, M-Mode, Doppler and               color flow. Technically challenging study due to prosthesis,               prominent lung artifact and body habitus.  PHYSICIAN INTERPRETATION: Left Ventricle: Left ventricular systolic function is normal, with an estimated ejection fraction of 70%. There are no regional  wall motion abnormalities. The left ventricular cavity size is normal. Left ventricular diastolic filling was indeterminate. Left Atrium: The left atrium is moderately dilated. Right Ventricle: The right ventricle is normal in size. There is normal right ventricular global systolic function. Right Atrium: The right atrium is normal in size. Aortic Valve: The aortic valve is trileaflet. There is no evidence of aortic valve regurgitation. The peak instantaneous gradient of the aortic valve is 2.8 mmHg. Mitral Valve: The mitral valve is normal in structure. There is no evidence of mitral valve regurgitation. Tricuspid Valve: The tricuspid valve is structurally normal. There is trace tricuspid regurgitation. Pulmonic Valve: The pulmonic valve is not well visualized. The pulmonic valve regurgitation was not well visualized. Pericardium: There is no pericardial effusion noted. Aorta: The aortic root is normal.  CONCLUSIONS:  1. Left ventricular systolic function is normal with a 70% estimated ejection fraction.  2. The left atrium is moderately dilated.  3. Left ventricular diastolic filling indeterminate. QUANTITATIVE DATA SUMMARY: 2D MEASUREMENTS:                          Normal Ranges: LAs:           4.50 cm   (2.7-4.0cm) IVSd:          0.61 cm   (0.6-1.1cm) LVPWd:         0.88 cm   (0.6-1.1cm) LVIDd:         3.66 cm   (3.9-5.9cm) LV Mass Index: 41.9 g/m2 LV SYSTOLIC FUNCTION BY 2D PLANIMETRY (MOD):                     Normal Ranges: EF-A4C View: 68.3 % (>=55%) LV DIASTOLIC FUNCTION:                     Normal Ranges: MV Peak E: 1.19 m/s (0.7-1.2 m/s) MV Peak A: 0.90 m/s (0.42-0.7 m/s) E/A Ratio: 1.32     (1.0-2.2) MITRAL VALVE:                 Normal Ranges: MV DT: 192 msec (150-240msec) AORTIC VALVE:                         Normal Ranges: AoV Vmax:      0.84 m/s (<=1.7m/s) AoV Peak P.8 mmHg (<20mmHg) LVOT Max Shimon:  0.47 m/s (<=1.1m/s) LVOT Diameter: 1.90 cm  (1.8-2.4cm) AoV Area,Vmax: 1.57 cm2 (2.5-4.5cm2)  TRICUSPID VALVE/RVSP:                   Normal Ranges: IVC Diam: 1.05 cm  [id number] Fausto Rowe DO Electronically signed on 1/22/2024 at 2:53:04 PM  ** Final **     XR foot left 3+ views    Result Date: 1/21/2024  Interpreted By:  Real Mendieta, STUDY: XR FOOT LEFT 1-2 VIEWS; 1/21/2024 4:15 pm   INDICATION: Signs/Symptoms:osteomyelitis. Pain   COMPARISON: 12/07/2023   ACCESSION NUMBER(S): [id number]   ORDERING CLINICIAN: BAN GIFFORD   TECHNIQUE: Views:  AP, Lat, Oblique, left foot   FINDINGS: RESULT: There is no evidence for fracture or dislocation. 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 Mild hammertoe deformities. Joint spaces appear adequately maintained. Soft tissue ulceration of the posterior aspect of the heel however no evidence for bony erosion to suggest osteomyelitis.       No evidence for acute fracture or acute bony erosion to suggest osteomyelitis. Chronic changes as described.   Signed by: Real Mendieta 1/21/2024 7:15 PM Dictation workstation:   [id number]    CT chest abdomen pelvis wo IV contrast    Result Date: 1/21/2024  Interpreted By:  Maria Garcia, STUDY: CT CHEST ABDOMEN PELVIS WO CONTRAST;  1/20/2024 11:42 pm   INDICATION: Mental status change. Elevated white blood cell count with infectious workup.   COMPARISON: 08/20/2023   ACCESSION NUMBER(S): [id number]   ORDERING CLINICIAN: BAN GIFFORD   TECHNIQUE: CT of the chest, abdomen and pelvis was performed with no oral or intravenous contrast administered. Sagittal and coronal reformations were completed by the technologist at the acquisition scanner.   All CT examinations are performed with 1 or more of the following dose reduction techniques: Automated exposure control, adjustment of mA and/or kv according to patient's size, or use of iterative reconstruction techniques.   FINDINGS: Please note that the study is limited without intravenous contrast.   CHEST: Bilateral pleural effusions are present with right  effusion moderately small in size and with the left effusion moderately small in size as well. There is shift of the heart and mediastinum to the left of midline due to atelectasis of the left upper lobe. There is consolidation throughout left lower lobe with air bronchograms noted. On the right, there is compressive atelectasis seen posteriorly in the right lower lobe.   There is mild reactive mediastinal adenopathy seen at this time with mediastinal lymph nodes increased in size since prior study. Several of these mediastinal nodes are at the upper limits of normal measuring 8 mm in short axis diameter.   No pericardial effusion is present. The cardiac size is normal with mitral annulus calcification.   There has been prior bilateral mastectomy with reconstruction of the left breast with implant placement. Surgical clips are visible within the left axilla. Stent grafts are visible within the left upper extremity and within the left innominate, subclavian as well as axillary veins.   A peripherally calcified 1.8 cm nodule arises from the lower pole of left thyroid lobe.   ABDOMEN:   LIVER: Unremarkable.   BILE DUCTS: No intrahepatic or extrahepatic biliary ductal dilatation is seen.   GALLBLADDER: Cholelithiasis is observed with some calcification of the gallbladder wall demonstrated as well. No gallbladder wall thickening or pericholecystic fluid is evident.   PANCREAS: Unremarkable   SPLEEN: Unremarkable   ADRENAL GLANDS: Unremarkable   KIDNEYS AND URETERS: Kidneys are small in size with extensive renal artery calcification demonstrated.   PELVIS:   BLADDER: Amos catheter is present within the decompressed urinary bladder.   REPRODUCTIVE ORGANS: Calcification of the arcuate arteries within the uterus is seen. There is no uterine enlargement or adnexal mass.   BOWEL: A rectal balloon is seen. There is no abnormal distention of the intestinal tract.   VESSELS: There is atherosclerosis of the thoracoabdominal aorta  and iliac arteries with calcification in the walls of the celiac, splenic, hepatic, and mesenteric arteries.   PERITONEUM/RETROPERITONEUM/LYMPH NODES: There is a minimal amount of ascites seen about the liver and spleen with mild presacral edema noted.   ABDOMINAL WALL: There is anasarca involving the soft tissues of the abdomen and pelvis. Postoperative change from ventral hernia repair is seen.   BONES: Bilateral sacroiliitis is seen. There is lumbar dextroscoliosis. Chronic rotator cuff tear is present bilaterally with osteoarthritis of both shoulders, right greater than left.       Chest 1.  Moderately small bilateral pleural effusions with left upper lobe atelectasis and compressive atelectasis seen posteriorly in right lower lobe. A left lower lobe pneumonia is identified. There is extensive airspace consolidation within left lower lobe with air bronchograms observed. Mild mediastinal reactive adenopathy is present as well.   Abdomen-Pelvis 1.  Cholelithiasis without evidence of cholecystitis. 2. Small amount of ascites with mild presacral edema and anasarca within the soft tissues of the abdominal wall. 3. Renal atrophy with extensive vascular calcifications.     MACRO: None   Signed by: Maria Garcia 1/21/2024 8:30 AM Dictation workstation:   [id number]    CT head wo IV contrast    Result Date: 1/21/2024  Interpreted By:  Maria Garcia, STUDY: CT HEAD WO IV CONTRAST 1/20/2024 11:42 pm   INDICATION: Signs/Symptoms:mental status changes   COMPARISON: 12/11/2023   ACCESSION NUMBER(S): [id number]   ORDERING CLINICIAN: BAN GIFFORD   TECHNIQUE: Unenhanced axial images of the brain are completed.   All CT examinations are performed with 1 or more of the following dose reduction techniques: Automated exposure control, adjustment of mA and/or kv according to patient's size, or use of iterative reconstruction techniques.   FINDINGS: Helical unenhanced axial images of the brain demonstrate a mild to moderate degree of  ventricular enlargement with proportionate widening of the sulci and sylvian fissures. There is no midline shift, mass effect, extra-axial fluid collection, or acute intracranial hemorrhage. There is diminished density seen in the periventricular white matter indicating chronic microvascular ischemic disease. There is calcified plaque seen within the distal vertebral and internal carotid arteries bilaterally. No calvarial abnormality is seen.       Atrophy and chronic microvascular ischemic disease without acute intracranial process.   Signed by: Maria Garcia 1/21/2024 8:09 AM Dictation workstation:   [id number]    XR chest 1 view    Result Date: 1/20/2024  Interpreted By:  Brendon Perez, STUDY: XR CHEST 1 VIEW; 1/20/2024 5:03 pm   INDICATION: CLINICAL INFORMATION: Signs/Symptoms:Insertion right IJ central line, also left thoracentesis.   COMPARISON: 01/19/2024 at 1338 hours   ACCESSION NUMBER(S): [id number]   ORDERING CLINICIAN: BOZENA ANTONIO   TECHNIQUE: Portable chest one view.   FINDINGS: The cardiac size is indeterminate in view of the AP projection. There is no change in the right-sided dual port central venous catheter. There is a new right internal jugular venous catheter present with the tip at approximately same level as the dual port central venous catheter, overlying the mid to lower right atrium. There is no evidence for pneumothorax. Patient has a history of left thoracentesis without evidence for pneumothorax on the left. There is bilateral basilar alveolar infiltrate and effusions. Left effusion is decreased compared to the study from 1 day earlier.   Surgical clips are identified within left chest wall. Endovascular stent is identified in the distribution of the left subclavian artery.       1. Status post right-sided central venous catheter placement as described above with the tip overlying the mid to caudal aspect of the right atrium. There is no evidence for pneumothorax. 2. No evidence for  left-sided pneumothorax after left-sided thoracentesis. Decrease in the left effusion. 3. Bilateral basilar infiltrates and effusions are present. Follow-up to assure complete clearing is suggested.   MACRO: none   Signed by: Brendon Perez 1/20/2024 5:11 PM Dictation workstation:   [id number]    XR chest 1 view    Result Date: 1/19/2024  Interpreted By:  Real Mendieta, STUDY: XR CHEST 1 VIEW; 1/19/2024 1:38 pm   INDICATION: Signs/Symptoms:dyspnea.   COMPARISON: 12/26/2023   ACCESSION NUMBER(S): [id number]   ORDERING CLINICIAN: EMELY GOLDSMITH   TECHNIQUE: 1 view of the chest was performed.   FINDINGS: There may be a minimal right pleural effusion. Slight prominence of the interstitium otherwise the right lung is clear. Improved appearance of the left lung with improved aeration of the right upper lobe. There is opacification of the left lower lobe possibly due to large pleural effusion which is similar to the prior study. No pneumothorax. Right-sided dual lumen central line catheter with tip at the proximal atrium. The cardiomediastinal silhouette is within normal limits. Left-sided subclavian stents are again noted.       Improved aeration and appearance of the left lung superiorly however there is a persistent large left pleural effusion and opacification of the left lower lobe.   Signed by: Real Mendieta 1/19/2024 1:44 PM Dictation workstation:   [id number]      I/O:    Intake/Output Summary (Last 24 hours) at 1/29/2024 0852  Last data filed at 1/29/2024 0600  Gross per 24 hour   Intake 929.8 ml   Output 150 ml   Net 779.8 ml       I have reviewed all medications, laboratory results, and imaging pertinent for today's encounter    Assessment/Plan:    I am currently managing this critically ill patient for the following problems:  #Septic Shock   #ESRD   #Chronic Hypotension   #Recurrent Pleural Effusions   #Vasculopathy   #Multiple Wounds with and with out infections  #Acute Metabolic  Encephalopathy  #Metabolic lactic acidosis, resolved   #Hx of recurrent C. Diff infection  #Hx of Atrial fibrillation   #Hx of endocarditis   #Hx of T2 DM   #Hx of anemia of chronic disease     Neuro/Psych/Pain Ctrl/Sedation:  #Acute Metabolic Encephalopathy, resolving   -Pain Control: Tylenol for mild  -CAM Assessment every shift, neuro assessments q4   -Palliative consulted   -Continue home Lexapro and gabapentin  -Sleep/wake cycle hygiene  -Delirium precaution    Respiratory/ENT:  #Recurrent Pleural Effusions   -Currently on 2L NC, denies any shortness of breath  -Continuous pulse oximetry, maintain SpO2 >90%  -Thoracentesis 1/20 over 1L of hazy straw colored fluid, Lights criteria suggest Transudative    -Aggressive pulmonary/bronchial hygiene     Cardiovascular:  #Septic Shock  #Chronic Hypotension   #Vasculopathy   #Hx of Atrial fibrillation   #Hx of endocarditis    -Levophed titrate to maintain SBP > 70, plan to wean levo off when able  -Home midodrine 15 mg TID when mental status is appropriate   -Continue home eliquis   -Continuous cardiac monitoring    GI:  -Regular diet assess mental status before feeding, adding in Magic cup TID and Christian BID   -Flexacil for stools   -BR with senna/colace PRN   -Protonix daily   -Slightly uptrending bilirubin with no signs of jaundice or hepatic failure, Hepatic panel scheduled for am draw    Renal/Volume Status (Intra & Extravascular):  #ESRD   #Metabolic lactic acidosis, resolved   -Nephrology following for dialysis needs, recommending M/W/F  -Elevated Lactate 2/2 hypoperfusion -resolved  -Conservative fluid management  -Daily BMP, Replete electrolytes as indicated for ESRD    -Dialysis via [facility] today     Endocrine  #Hx of T2 DM   -POCT glucose AC/HS  -SSI AC/HS    Infectious Disease:  #Multiple Wounds with and with out infections  #Hx of recurrent C. Diff infection  #Hx of endocarditis   -Doxycycline 100 mg every day for suppressive therapy   -Vancomycin PO for C.  Diff prophylaxis   -ID following   -C. Diff and MRSA PCR negative   -Blood cultures from 1/19, negative   -Urine culture from 1/9, positive for proteus mirabilis     Heme/Onc:  #Hx of anemia of chronic disease   -monitor for s/s anemia  -transfuse for hgb <7  -daily CBC  -Duplex upper extremity US negative for UE DVT    Derm/MSK:  #Multiple Wounds with and with out infections  -Surgery consulted   -Wound care consulted for dressing other wounds   -Podiatry consulted  -XR Left ankle not suspicious for osteomyelitis   -XR Right ankle and tib/fib not suspicious for osteomyelitis   -Continue Uloric for gout prophylaxis   -padded pressure points  -ICU skin protocol    Ethics/Code Status:  Full Code     :  DVT Prophylaxis: home eliquis   GI Prophylaxis: PPI  Bowel Regimen: Senna  Diet: Regular   CVC: Right IJ  [place]: Right radial   Amos: None   Restraints: n/a  Dispo: remain in the ICU     Critical Care Time:  50 minutes spent in preparing to see patient (I.e. review of medical records), evaluation of diagnostics (I.e. labs, imaging, etc.), documentation, discussing plan of care with patient/ family/ caregiver, and/ or coordination of care with multidisciplinary team. Time does not include completion of procedure time.        Vinod Livingston PA-C  Pulmonary and Critical Care Medicine   [facility]

## 2024-01-29 NOTE — PROGRESS NOTES
Ayanna Gross is a 73 y.o. female on day 10 of admission presenting with Septic shock (CMS/HCC).    Subjective   Interval History:   Patient seen and examined  On low-dose pressors  Awake, alert    No abdominal pain, nausea or vomiting    Review of Systems   All other systems reviewed and are negative.      Objective   Range of Vitals (last 24 hours)  Heart Rate:  [0-109]   Temp:  [35.9 °C (96.6 °F)-36.6 °C (97.9 °F)]   Resp:  [13-27]   Weight:  [93 kg (205 lb 0.4 oz)]   SpO2:  [72 %-100 %]   Daily Weight  01/29/24 : 93 kg (205 lb 0.4 oz)    Body mass index is 37.5 kg/m².    Physical Exam  Constitutional:       Appearance: Normal appearance.   HENT:      Head: Normocephalic and atraumatic.      Nose: Nose normal.   Eyes:      Extraocular Movements: Extraocular movements intact.      Conjunctiva/sclera: Conjunctivae normal.   Cardiovascular:      Heart sounds: Normal heart sounds, S1 normal and S2 normal.   Pulmonary:      Breath sounds: Decreased breath sounds present.   Abdominal:      General: Bowel sounds are normal.      Palpations: Abdomen is soft.   Musculoskeletal:      Cervical back: Normal range of motion and neck supple.   Skin:     Comments: Stage III sacral ulcer, bilateral posterior heel eschar -photos examined  Neurological:      Mental Status: She is alert.     Antibiotics  aspirin tablet 325 mg  acetaminophen (Tylenol) tablet 650 mg  cefepime (Maxipime) 1 g in dextrose 5 % 50 mL IV  sodium chloride 0.9 % bolus 2,229 mL  apixaban (Eliquis) tablet 2.5 mg  escitalopram (Lexapro) tablet 10 mg  febuxostat (Uloric) tablet 40 mg  fenofibrate (Triglide) tablet 160 mg  gabapentin (Neurontin) capsule 100 mg  midodrine (Proamatine) tablet 15 mg  nystatin (Mycostatin) 100,000 unit/gram powder  oxyCODONE-acetaminophen (Percocet) 5-325 mg per tablet 1 tablet  simvastatin (Zocor) tablet 20 mg  piperacillin-tazobactam-dextrose (Zosyn) IV 2.25 g  vancomycin (Vancocin) capsule 125 mg  oxygen (O2) therapy  dextrose 50  % injection 25 g  glucagon (Glucagen) injection 1 mg  dextrose 10 % in water (D10W) infusion  insulin lispro (HumaLOG) injection 0-10 Units  nystatin (Mycostatin) 100,000 unit/gram powder 1 Application  vancomycin-diluent combo no.1 (Xellia) IVPB 1,750 mg  piperacillin-tazobactam-dextrose (Zosyn) IV 2.25 g  sodium chloride 0.9 % bolus 500 mL  norepinephrine (Levophed) 8 mg in dextrose 5% 250 mL (0.032 mg/mL) infusion (premix)  vancomycin (Vancocin) placeholder  pantoprazole (ProtoNix) EC tablet 40 mg  pantoprazole (ProtoNix) injection 40 mg  sennosides-docusate sodium (Judy-Colace) 8.6-50 mg per tablet 1 tablet  doxycycline (Vibramycin) in dextrose 5 % in water (D5W) 100 mL  mg  LORazepam (Ativan) injection 2 mg  magnesium sulfate IV 2 g  zinc oxide 20 % ointment 1 Application  nystatin (Mycostatin) cream  norepinephrine (Levophed) 8 mg in dextrose 5% 250 mL (0.032 mg/mL) infusion (premix)  heparin 1,000 unit/mL injection 2,000 Units  heparin 1,000 unit/mL injection 2,000 Units  nystatin (Mycostatin) ointment  acetaminophen (Tylenol) oral liquid 1,000 mg  albumin human 25 % solution 12.5 g  perflutren lipid microspheres (Definity) injection 0.5-10 mL of dilution  sulfur hexafluoride microsphr (Lumason) injection 24.28 mg  perflutren protein A microsphere (Optison) injection 0.5 mL  ipratropium-albuteroL (Duo-Neb) 0.5-2.5 mg/3 mL nebulizer solution 3 mL  sodium chloride 3 % nebulizer solution 3 mL  vancomycin-diluent combo no.1 (Xellia) IVPB 750 mg  sennosides-docusate sodium (Judy-Colace) 8.6-50 mg per tablet 1 tablet  acetaminophen (Tylenol) tablet 650 mg  doxycycline (Vibramycin) capsule 100 mg  magnesium oxide (Mag-Ox) tablet 400 mg  vasopressin (Vasostrict) 0.2 unit/mL infusion  norepinephrine (Levophed) 8 mg in dextrose 5% 250 mL (0.032 mg/mL) infusion (premix)  heparin 1,000 unit/mL injection 2,000 Units  heparin 1,000 unit/mL injection 2,000 Units  albumin human 25 % solution 12.5 g  vancomycin (Xellia)  1 g in 200 mL (Xellia) IVPB 1 g  ipratropium-albuteroL (Duo-Neb) 0.5-2.5 mg/3 mL nebulizer solution 3 mL  sodium chloride 3 % nebulizer solution 3 mL  albumin human 5 % infusion 12.5 g  heparin 1,000 unit/mL injection 2,000 Units  heparin 1,000 unit/mL injection 2,000 Units  vancomycin (Vancocin) capsule 125 mg  albumin human 25 % solution 12.5 g  heparin 1,000 unit/mL injection 2,000 Units  heparin 1,000 unit/mL injection 2,000 Units  ipratropium-albuteroL (Duo-Neb) 0.5-2.5 mg/3 mL nebulizer solution 3 mL      Relevant Results  Labs  Results from last 72 hours   Lab Units 01/29/24  0331 01/28/24  0322 01/27/24  0505   WBC AUTO x10*3/uL 16.2* 14.6* 12.0*   HEMOGLOBIN g/dL 9.4* 9.4* 9.5*   HEMATOCRIT % 29.7* 29.2* 29.7*   PLATELETS AUTO x10*3/uL 141* 139* 137*     Results from last 72 hours   Lab Units 01/29/24  0331 01/28/24  0322 01/27/24  0505   SODIUM mmol/L 131* 134 134   POTASSIUM mmol/L 3.7 4.0 3.5   CHLORIDE mmol/L 97 99 98   CO2 mmol/L 19* 22* 23*   BUN mg/dL 12 9 7*   CREATININE mg/dL 3.20* 2.50* 1.90*   GLUCOSE mg/dL 150* 100* 134*   CALCIUM mg/dL 8.6 8.4* 8.7   ANION GAP mmol/L 15 13 13   EGFR mL/min/1.73m*2 15* 20* 28*   PHOSPHORUS mg/dL 3.1 2.4* 2.0*     Results from last 72 hours   Lab Units 01/29/24  0331 01/28/24  0322 01/27/24  0505   ALK PHOS U/L 183* 193* 162*   BILIRUBIN TOTAL mg/dL 1.7* 1.6* 1.8*   PROTEIN TOTAL g/dL 4.8* 4.9* 5.1*   ALT U/L 14 15 13   AST U/L 34 50* 29   ALBUMIN g/dL 2.6* 2.7* 3.1*     Estimated Creatinine Clearance: 17.1 mL/min (A) (by C-G formula based on SCr of 3.2 mg/dL (H)).  C-Reactive Protein   Date Value Ref Range Status   12/25/2023 5.20 (H) 0.00 - 2.00 mg/dL Final     CRP   Date Value Ref Range Status   06/23/2023 19.9 (H) 0 - 2.0 MG/DL Final     Comment:     Performed at [address] [address]   05/22/2022 1.0 0 - 2.0 MG/DL Final     Comment:     Performed at [address] [address]     Microbiology  Susceptibility data  from last 14 days.  Collected Specimen Info Organism Ampicillin Cefazolin Cefazolin (uncomplicated UTIs only) Ciprofloxacin Gentamicin Piperacillin/Tazobactam Tetracycline Trimethoprim/Sulfamethoxazole   01/19/24 Urine from Indwelling (Amos) Catheter Proteus mirabilis S S S S S S R S   Reviewed  Imaging  Upper extremity venous duplex bilateral    Result Date: 1/25/2024           [facility] [id number] [place], [place] [address]            Phone [phone]  Vascular Lab Report  [facility] US UPPER EXTREMITY VENOUS DUPLEX BILATERAL Patient Name:      BASIA Gray Physician:  [id number] Mini Busby MD, RPVI Study Date:        1/25/2024           Ordering Provider:  [id number] ERI BERMAN MRN/PID:           [id number]            Fellow: Accession#:        [id number]        Technologist:       Dara Parr RVT Date of Birth/Age: 1950 / 73 years Technologist 2: Gender:            F                   Encounter#:         [id number] Admission Status:  Inpatient           Location Performed: [facility]  Diagnosis/ICD: Right arm swelling-M79.89; Left arm swelling-M79.89 CPT Codes:     04613 Peripheral venous duplex scan for DVT complete  CONCLUSIONS:  Right Upper Venous: No evidence of acute deep vein thrombus visualized in the right upper extremity. Internal jugular vein was visualized in segments due to IV lines and bandages. Left Upper Venous: No evidence of acute deep vein thrombus visualized in the left upper extremity. Subclavian stent is noted and appears patent. There is a known occluded dialysis access noted.  Additional Findings: Technically difficult exam due to IV lines, bandages, and patient's positioning.  Imaging & Doppler Findings:  Right               Compressible Thrombus        Flow Internal Jugular        Yes        None   Spontaneous/Phasic Subclavian              Yes        None Subclavian Proximal      Yes        None   Spontaneous/Phasic Subclavian Mid          Yes        None Subclavian Distal       Yes        None   Spontaneous/Phasic Axillary                Yes        None       Pulsatile Brachial                Yes        None Cephalic                Yes        None Basilic                 Yes        None  Left                Compress Thrombus        Flow Internal Jugular      Yes      None       Pulsatile Subclavian            Yes      None Subclavian Proximal   Yes      None       Pulsatile Subclavian Mid        Yes      None       Pulsatile Subclavian Distal     Yes      None       Pulsatile Axillary              Yes      None   Spontaneous/Phasic Brachial              Yes      None Cephalic              Yes      None Basilic               Yes      None  [id number] Mini Busby MD, RPVI Electronically signed by [id number] Mini Busby MD, ALICE on 1/25/2024 at 4:06:13 PM  ** Final **     ECG 12 lead    Result Date: 1/24/2024  Sinus tachycardia  with 1st degree AV block Right bundle branch block Possible Lateral infarct , age undetermined Abnormal ECG Confirmed by Yesika Nj ([id number]) on 1/24/2024 6:54:45 AM    XR tibia fibula right 2 views    Result Date: 1/22/2024  Interpreted By:  Real Mendieta, STUDY: XR TIBIA FIBULA RIGHT 2 VIEWS; 1/22/2024 2:15 pm   INDICATION: Signs/Symptoms:evaluate source of infection, osteomyelitis;   COMPARISON: None available.   ACCESSION NUMBER(S): [id number]   ORDERING CLINICIAN: BAN GIFFORD   TECHNIQUE: Views: AP and Lateral of the right tibia and fibula   FINDINGS: RESULT: There is no evidence for fracture or dislocation. Tricompartmental degenerative changes of the right knee. The tibia and fibula appear intact without bony erosion or evidence for osteomyelitis. No other bony or soft tissue abnormality is identified. No subcutaneous gas is noted.       No evidence for acute osseous abnormality. No bony erosion to suggest osteomyelitis.   Signed by: Real Mendieta 1/22/2024  3:15 PM Dictation workstation:   [id number]    XR ankle right 3+ views    Result Date: 1/22/2024  Interpreted By:  Real Mendieta, STUDY: XR ANKLE RIGHT 3+ VIEWS; 1/22/2024 2:15 pm   INDICATION: Signs/Symptoms:Evuluate source of infection, osteomyelitis;   COMPARISON: None available.   ACCESSION NUMBER(S): [id number]   ORDERING CLINICIAN: BAN GIFFORD   TECHNIQUE: Views: AP, Lateral, Oblique, right ankle   FINDINGS: RESULT: There is no evidence for fracture or dislocation. The ankle mortise is intact. Joint spaces appear adequately maintained. No bony erosion to suggest osteomyelitis. No bone lesion or soft tissue abnormality is identified. No subcutaneous gas is seen.       No evidence for acute osseous abnormality. No bony erosion to suggest osteomyelitis.   Signed by: Real Mendieta 1/22/2024 3:14 PM Dictation workstation:   [id number]    Transthoracic Echo (TTE) Complete    Result Date: 1/22/2024           [place] [address]            Phone [phone] TRANSTHORACIC ECHOCARDIOGRAM REPORT  Patient Name:      BASIA GUAN GARY Gray Physician:   [id number] [facility] Study Date:        1/22/2024           Ordering Provider:   [id number] ERI BERMAN MRN/PID:           [id number]            Fellow: Accession#:        [id number]        Nurse: Date of Birth/Age: 1950 / 73 years Sonographer:         Tabatha Page RDCS Gender:            F                   Additional Staff: Height:            157.00 cm           Admit Date: Weight:            75.00 kg            Admission Status:    Inpatient - Routine BSA:               1.76 m2             Department Location: [facility] Blood Pressure: 88 /49 mmHg Study Type:    TRANSTHORACIC ECHO (TTE) COMPLETE Diagnosis/ICD: Chronic combined systolic (congestive) and diastolic (congestive)                heart failure (CHF)-I50.42; Sepsis, unspecified  organism-A41.9 Indication:    heart failure,septic shock CPT Codes:     Echo Complete w Full Doppler-94529 Patient History: BMI:               Obese >30 Pertinent History: Sepsis, PVD, A-Fib, CVA, Cancer and HTN. breast                    prosthesis/ca, ESRD,anemia,septic shock,heart failure. Study Detail: The following Echo studies were performed: 2D, M-Mode, Doppler and               color flow. Technically challenging study due to prosthesis,               prominent lung artifact and body habitus.  PHYSICIAN INTERPRETATION: Left Ventricle: Left ventricular systolic function is normal, with an estimated ejection fraction of 70%. There are no regional wall motion abnormalities. The left ventricular cavity size is normal. Left ventricular diastolic filling was indeterminate. Left Atrium: The left atrium is moderately dilated. Right Ventricle: The right ventricle is normal in size. There is normal right ventricular global systolic function. Right Atrium: The right atrium is normal in size. Aortic Valve: The aortic valve is trileaflet. There is no evidence of aortic valve regurgitation. The peak instantaneous gradient of the aortic valve is 2.8 mmHg. Mitral Valve: The mitral valve is normal in structure. There is no evidence of mitral valve regurgitation. Tricuspid Valve: The tricuspid valve is structurally normal. There is trace tricuspid regurgitation. Pulmonic Valve: The pulmonic valve is not well visualized. The pulmonic valve regurgitation was not well visualized. Pericardium: There is no pericardial effusion noted. Aorta: The aortic root is normal.  CONCLUSIONS:  1. Left ventricular systolic function is normal with a 70% estimated ejection fraction.  2. The left atrium is moderately dilated.  3. Left ventricular diastolic filling indeterminate. QUANTITATIVE DATA SUMMARY: 2D MEASUREMENTS:                          Normal Ranges: LAs:           4.50 cm   (2.7-4.0cm) IVSd:          0.61 cm   (0.6-1.1cm) LVPWd:          0.88 cm   (0.6-1.1cm) LVIDd:         3.66 cm   (3.9-5.9cm) LV Mass Index: 41.9 g/m2 LV SYSTOLIC FUNCTION BY 2D PLANIMETRY (MOD):                     Normal Ranges: EF-A4C View: 68.3 % (>=55%) LV DIASTOLIC FUNCTION:                     Normal Ranges: MV Peak E: 1.19 m/s (0.7-1.2 m/s) MV Peak A: 0.90 m/s (0.42-0.7 m/s) E/A Ratio: 1.32     (1.0-2.2) MITRAL VALVE:                 Normal Ranges: MV DT: 192 msec (150-240msec) AORTIC VALVE:                         Normal Ranges: AoV Vmax:      0.84 m/s (<=1.7m/s) AoV Peak P.8 mmHg (<20mmHg) LVOT Max Shimon:  0.47 m/s (<=1.1m/s) LVOT Diameter: 1.90 cm  (1.8-2.4cm) AoV Area,Vmax: 1.57 cm2 (2.5-4.5cm2) TRICUSPID VALVE/RVSP:                   Normal Ranges: IVC Diam: 1.05 cm  [id number] [facility]  Electronically signed on 2024 at 2:53:04 PM  ** Final **     XR foot left 3+ views    Result Date: 2024  Interpreted By:  Real Mendieta, STUDY: XR FOOT LEFT 1-2 VIEWS; 2024 4:15 pm   INDICATION: Signs/Symptoms:osteomyelitis. Pain   COMPARISON: 2023   ACCESSION NUMBER(S): [id number]   ORDERING CLINICIAN: BAN GIFFORD   TECHNIQUE: Views:  AP, Lat, Oblique, left foot   FINDINGS: RESULT: There is no evidence for fracture or dislocation. 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 Mild hammertoe deformities. Joint spaces appear adequately maintained. Soft tissue ulceration of the posterior aspect of the heel however no evidence for bony erosion to suggest osteomyelitis.       No evidence for acute fracture or acute bony erosion to suggest osteomyelitis. Chronic changes as described.   Signed by: Real Mendieta 2024 7:15 PM Dictation workstation:   [id number]    CT chest abdomen pelvis wo IV contrast    Result Date: 2024  Interpreted By:  Maria Garcia, STUDY: CT CHEST ABDOMEN PELVIS WO CONTRAST;  2024 11:42 pm   INDICATION: Mental status change. Elevated white blood cell count with infectious workup.   COMPARISON:  08/20/2023   ACCESSION NUMBER(S): [id number]   ORDERING CLINICIAN: BAN GIFFORD   TECHNIQUE: CT of the chest, abdomen and pelvis was performed with no oral or intravenous contrast administered. Sagittal and coronal reformations were completed by the technologist at the acquisition scanner.   All CT examinations are performed with 1 or more of the following dose reduction techniques: Automated exposure control, adjustment of mA and/or kv according to patient's size, or use of iterative reconstruction techniques.   FINDINGS: Please note that the study is limited without intravenous contrast.   CHEST: Bilateral pleural effusions are present with right effusion moderately small in size and with the left effusion moderately small in size as well. There is shift of the heart and mediastinum to the left of midline due to atelectasis of the left upper lobe. There is consolidation throughout left lower lobe with air bronchograms noted. On the right, there is compressive atelectasis seen posteriorly in the right lower lobe.   There is mild reactive mediastinal adenopathy seen at this time with mediastinal lymph nodes increased in size since prior study. Several of these mediastinal nodes are at the upper limits of normal measuring 8 mm in short axis diameter.   No pericardial effusion is present. The cardiac size is normal with mitral annulus calcification.   There has been prior bilateral mastectomy with reconstruction of the left breast with implant placement. Surgical clips are visible within the left axilla. Stent grafts are visible within the left upper extremity and within the left innominate, subclavian as well as axillary veins.   A peripherally calcified 1.8 cm nodule arises from the lower pole of left thyroid lobe.   ABDOMEN:   LIVER: Unremarkable.   BILE DUCTS: No intrahepatic or extrahepatic biliary ductal dilatation is seen.   GALLBLADDER: Cholelithiasis is observed with some calcification of the gallbladder  wall demonstrated as well. No gallbladder wall thickening or pericholecystic fluid is evident.   PANCREAS: Unremarkable   SPLEEN: Unremarkable   ADRENAL GLANDS: Unremarkable   KIDNEYS AND URETERS: Kidneys are small in size with extensive renal artery calcification demonstrated.   PELVIS:   BLADDER: Amos catheter is present within the decompressed urinary bladder.   REPRODUCTIVE ORGANS: Calcification of the arcuate arteries within the uterus is seen. There is no uterine enlargement or adnexal mass.   BOWEL: A rectal balloon is seen. There is no abnormal distention of the intestinal tract.   VESSELS: There is atherosclerosis of the thoracoabdominal aorta and iliac arteries with calcification in the walls of the celiac, splenic, hepatic, and mesenteric arteries.   PERITONEUM/RETROPERITONEUM/LYMPH NODES: There is a minimal amount of ascites seen about the liver and spleen with mild presacral edema noted.   ABDOMINAL WALL: There is anasarca involving the soft tissues of the abdomen and pelvis. Postoperative change from ventral hernia repair is seen.   BONES: Bilateral sacroiliitis is seen. There is lumbar dextroscoliosis. Chronic rotator cuff tear is present bilaterally with osteoarthritis of both shoulders, right greater than left.       Chest 1.  Moderately small bilateral pleural effusions with left upper lobe atelectasis and compressive atelectasis seen posteriorly in right lower lobe. A left lower lobe pneumonia is identified. There is extensive airspace consolidation within left lower lobe with air bronchograms observed. Mild mediastinal reactive adenopathy is present as well.   Abdomen-Pelvis 1.  Cholelithiasis without evidence of cholecystitis. 2. Small amount of ascites with mild presacral edema and anasarca within the soft tissues of the abdominal wall. 3. Renal atrophy with extensive vascular calcifications.     MACRO: None   Signed by: Maria Garcia 1/21/2024 8:30 AM Dictation workstation:   [id number]    CT  head wo IV contrast    Result Date: 1/21/2024  Interpreted By:  Maria Garcia, STUDY: CT HEAD WO IV CONTRAST 1/20/2024 11:42 pm   INDICATION: Signs/Symptoms:mental status changes   COMPARISON: 12/11/2023   ACCESSION NUMBER(S): [id number]   ORDERING CLINICIAN: BAN GIFFORD   TECHNIQUE: Unenhanced axial images of the brain are completed.   All CT examinations are performed with 1 or more of the following dose reduction techniques: Automated exposure control, adjustment of mA and/or kv according to patient's size, or use of iterative reconstruction techniques.   FINDINGS: Helical unenhanced axial images of the brain demonstrate a mild to moderate degree of ventricular enlargement with proportionate widening of the sulci and sylvian fissures. There is no midline shift, mass effect, extra-axial fluid collection, or acute intracranial hemorrhage. There is diminished density seen in the periventricular white matter indicating chronic microvascular ischemic disease. There is calcified plaque seen within the distal vertebral and internal carotid arteries bilaterally. No calvarial abnormality is seen.       Atrophy and chronic microvascular ischemic disease without acute intracranial process.   Signed by: Maria Garcia 1/21/2024 8:09 AM Dictation workstation:   [id number]    XR chest 1 view    Result Date: 1/20/2024  Interpreted By:  Brendon Perez, STUDY: XR CHEST 1 VIEW; 1/20/2024 5:03 pm   INDICATION: CLINICAL INFORMATION: Signs/Symptoms:Insertion right IJ central line, also left thoracentesis.   COMPARISON: 01/19/2024 at 1338 hours   ACCESSION NUMBER(S): [id number]   ORDERING CLINICIAN: BOZENA ANTONIO   TECHNIQUE: Portable chest one view.   FINDINGS: The cardiac size is indeterminate in view of the AP projection. There is no change in the right-sided dual port central venous catheter. There is a new right internal jugular venous catheter present with the tip at approximately same level as the dual port central venous  catheter, overlying the mid to lower right atrium. There is no evidence for pneumothorax. Patient has a history of left thoracentesis without evidence for pneumothorax on the left. There is bilateral basilar alveolar infiltrate and effusions. Left effusion is decreased compared to the study from 1 day earlier.   Surgical clips are identified within left chest wall. Endovascular stent is identified in the distribution of the left subclavian artery.       1. Status post right-sided central venous catheter placement as described above with the tip overlying the mid to caudal aspect of the right atrium. There is no evidence for pneumothorax. 2. No evidence for left-sided pneumothorax after left-sided thoracentesis. Decrease in the left effusion. 3. Bilateral basilar infiltrates and effusions are present. Follow-up to assure complete clearing is suggested.   MACRO: none   Signed by: Brendon Perez 1/20/2024 5:11 PM Dictation workstation:   [id number]    XR chest 1 view    Result Date: 1/19/2024  Interpreted By:  Real Mendieta, STUDY: XR CHEST 1 VIEW; 1/19/2024 1:38 pm   INDICATION: Signs/Symptoms:dyspnea.   COMPARISON: 12/26/2023   ACCESSION NUMBER(S): [id number]   ORDERING CLINICIAN: EMELY GOLDSMITH   TECHNIQUE: 1 view of the chest was performed.   FINDINGS: There may be a minimal right pleural effusion. Slight prominence of the interstitium otherwise the right lung is clear. Improved appearance of the left lung with improved aeration of the right upper lobe. There is opacification of the left lower lobe possibly due to large pleural effusion which is similar to the prior study. No pneumothorax. Right-sided dual lumen central line catheter with tip at the proximal atrium. The cardiomediastinal silhouette is within normal limits. Left-sided subclavian stents are again noted.       Improved aeration and appearance of the left lung superiorly however there is a persistent large left pleural effusion and opacification of  the left lower lobe.   Signed by: Real Mendieta 1/19/2024 1:44 PM Dictation workstation:   [id number]    Electrocardiogram, 12-lead PRN ACS symptoms    Result Date: 1/7/2024  Sinus rhythm Right bundle branch block Septal infarct (cited on or before 06-DEC-2023) Abnormal ECG When compared with ECG of 06-DEC-2023 13:06, Questionable change in initial forces of Septal leads Confirmed by Herminio Lemus ([id number]) on 1/7/2024 11:04:01 AM     Assessment/Plan     Septic shock-on pressors-resolving  Left-sided pleural effusion   Left lower lobe pneumonia-treated  Proteus urinary tract infection-treated  History of MRSA endocarditis  History of C. 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

## 2024-01-29 NOTE — NURSING NOTE
Patient with RT internal jugular TLC, dressing change done using sterile technique, sutures intact, site with no redness, drainage or swelling, all blue caps changed, all lumens with blood return, 2 lumens clamped and curos caps applied, 1 lumen IV resumed.  Rt chest dialysis catheters, dressing change done using sterile technique, no redness, drainage or swelling noted, sutures not intact, noted on new dressing.

## 2024-01-29 NOTE — PROCEDURES
Midline Catheter Insertion Note    Catheter size:  4 Fr  Catheter length: 20 cm  Arm circumference: 22.5 cm at mid-bicep  Vein:   Right brachial vein  Catheter exposed: 2 cm    Discussed risks & benefits.  Chloroprep and sterile precautions.  2 mL 1% lidocaine.  Vein punctured with 20 Ga introducer needle.  Guidewire thread easily through needle and needle removed.  4.5 Fr MicroEZ Microintroducer with vessel dilator thread easily over guidewire and guidewire removed.  Vessel dilator removed.  Midline catheter thread easily through microintroducer.  Microintroducer peeled away.  Midline catheter aspirated and flushed with sterile NS.  Applied CHG dressing.  No complications.  Patient tolerated well.    Fiz PowerMidline Catheter  Lot #[id number]  Exp: 2025-03-31

## 2024-01-29 NOTE — PROGRESS NOTES
"Ayanna Gross is a 73 y.o. female on day 10 of admission presenting with Septic shock (CMS/HCC).    Subjective   On Levo. Sacral rash improving. Dialysis catheter patent.      Objective     Physical Exam  Constitutional:       Appearance: She is ill-appearing.   HENT:      Head: Normocephalic and atraumatic.   Cardiovascular:      Rate and Rhythm: Normal rate.   Pulmonary:      Effort: Pulmonary effort is normal.   Abdominal:      General: Abdomen is flat. Bowel sounds are normal.   Skin:     General: Skin is warm and dry.      Findings: Rash present.      Comments: Heel wounds with eschar. Sacral rash red and present to b/l buttocks and sacrum.    Neurological:      Mental Status: She is alert. She is disoriented.      Motor: Weakness present.         Last Recorded Vitals  Blood pressure 85/65, pulse 108, temperature 36.6 °C (97.9 °F), temperature source Temporal, resp. rate 18, height 1.575 m (5' 2\"), weight 93 kg (205 lb 0.4 oz), SpO2 92 %.  Intake/Output last 3 Shifts:  I/O last 3 completed shifts:  In: 1135.3 (12.2 mL/kg) [P.O.:940; I.V.:195.3 (2.1 mL/kg)]  Out: 350 (3.8 mL/kg) [Stool:350]  Dosing Weight: 93 kg     Relevant Results  Lab Results   Component Value Date    WBC 16.2 (H) 01/29/2024    HGB 9.4 (L) 01/29/2024    HCT 29.7 (L) 01/29/2024    MCV 93 01/29/2024     (L) 01/29/2024     Lab Results   Component Value Date    GLUCOSE 150 (H) 01/29/2024    CALCIUM 8.6 01/29/2024     (L) 01/29/2024    K 3.7 01/29/2024    CO2 19 (L) 01/29/2024    CL 97 01/29/2024    BUN 12 01/29/2024    CREATININE 3.20 (H) 01/29/2024       Assessment/Plan   Principal Problem:    Septic shock (CMS/HCC)  Active Problems:    Pneumonia    Low blood pressure    End stage renal disease (CMS/HCC)    Anemia due to blood loss, acute  1/29: Sacral rash improving with nystatin and zinc oxide. Specialty mattress, supplements. Daily wound care. No new recommendations.     1/26/24: continue with daily wound care. Podiatry " following for heel wounds. Wait to see if permcath will be needed. Supplements, specialty mattress, daily wound care, Christian.     1/25: Continue with Nystatin and Zinc paste to sacrum. Per podiatry note, her heel wounds are stable and should continue with betadine and dry dressings. Should the wound become unstable, she may be able to have debridement. Will continue to follow for wound care.     1/24: Continue with nystatin and zinc paste to sacral area. Vascular saw patient and she is not a candidate for surgical intervention to heel wounds due to high dose pressor requirement. I do not see any recs from podiatry; however, per wound care RN note, there was a possibility of debridement today. I have reached out to podiatry. For now, continue to paint with betadine and cover with kerlex.    1/23: Moisture associated sacral rash washed with soap and water and a combination of triad and nystatin powder applied. Podiatry is currently present at the bedside, awaiting recs for heel wounds. Will follow for wound care.     1/22:Heel wounds to be evaluated by podiatry for possible debridement. For sacral dermatitis, clean with soap and water, dry completely and apply mixture of zinc oxide and nystatin.        I spent 15 minutes in the professional and overall care of this patient.      Nickolas Gallego, MADHURI-CNP

## 2024-01-29 NOTE — PROGRESS NOTES
CONSULT PROGRESS NOTES    SERVICE DATE: 1/29/2024   SERVICE TIME: 11:34 AM    CONSULTING SERVICE: Nephrology    ASSESSMENT AND PLAN   1.  End-stage renal disease  2.  Hypotension  3.  Hyponatremia  4.  Hypokalemia  5.  Anemia of chronic kidney disease     Dialysis today via [facility] again with low temperature, and attempting gentle fluid removal.    She is extremely malnourished with low albumin, low prealbumin, no major muscle mass.  Hyponatremia from volume overload in this patient with oliguria.  For her hypokalemia, use a 4K bath.  Hemoglobin is at goal.  Trial of holding the IV albumin with dialysis today.  Attempting to wean from pressors with a target systolic blood pressure greater than 70.  Typically, she has no major mental status changes with a blood pressure above 70.    She has been getting regular dialysis and I do not suspect she is uremic at this point.  Challenging case, lots of major medical problems, profound hypotension requiring pressors.  Doubtful HD will be needed this weekend.  Use a Monday, Wednesday, Friday hemodialysis schedule.  Arrangements for LTAC sounds appropriate.  Case discussed with Dr. Barreto.    SUBJECTIVE  INTERVAL HPI: She has no new issues or concerns.  She denies dyspnea, denies pain.  Blood pressure still running low and unable to totally wean from Levophed infusion.  She is on a very low-dose.  No meaningful urine output.  No fevers.  She is pleasant.  Conversant.    MEDICATIONS:  acetaminophen, 650 mg, oral, TID  apixaban, 2.5 mg, oral, BID  doxycylcine, 100 mg, oral, Daily  escitalopram, 10 mg, oral, Nightly  gabapentin, 100 mg, oral, BID  insulin lispro, 0-10 Units, subcutaneous, TID with meals  ipratropium-albuteroL, 3 mL, nebulization, q8h  midodrine, 15 mg, oral, TID with meals  nystatin, , Topical, BID  nystatin, , Topical, BID  pantoprazole, 40 mg, oral, Daily   Or  pantoprazole, 40 mg, intravenous, Daily  sodium chloride, 3 mL, nebulization, TID  vancomycin, 125 mg,  oral, Daily  zinc oxide, 1 Application, Topical, BID       norepinephrine, 0.01-3 mcg/kg/min, Last Rate: 0.04 mcg/kg/min (01/29/24 1025)       PRN medications: dextrose 10 % in water (D10W), dextrose, glucagon, oxygen, sennosides-docusate sodium     OBJECTIVE  PHYSICAL EXAM:   Heart Rate:  [0-109]   Temp:  [36 °C (96.8 °F)-36.6 °C (97.9 °F)]   Resp:  [12-27]   Weight:  [93 kg (205 lb 0.4 oz)]   SpO2:  [72 %-100 %]   Body mass index is 37.5 kg/m².  Chronically ill-appearing elderly white woman  Pale skin  Right-sided hand swelling  Left-sided finger amputation  Internal jugular tunneled hemodialysis catheter in place  She has some pretibial edema on both lower extremities  Soft abdomen  No Amos  No obvious joint deformities  Moist mucosa  Hearing seems to be intact  Phonation intact  Arterial line placed  Right internal jugular triple-lumen catheter in place  Rectal tube    DATA:   Labs:  Results for orders placed or performed during the hospital encounter of 01/19/24 (from the past 96 hour(s))   POCT GLUCOSE   Result Value Ref Range    POCT Glucose 146 (H) 74 - 99 mg/dL   POCT GLUCOSE   Result Value Ref Range    POCT Glucose 151 (H) 74 - 99 mg/dL   POCT GLUCOSE   Result Value Ref Range    POCT Glucose 133 (H) 74 - 99 mg/dL   Comprehensive metabolic panel   Result Value Ref Range    Glucose 158 (H) 65 - 99 mg/dL    Sodium 130 (L) 133 - 145 mmol/L    Potassium 3.7 3.4 - 5.1 mmol/L    Chloride 92 (L) 97 - 107 mmol/L    Bicarbonate 23 (L) 24 - 31 mmol/L    Urea Nitrogen 11 8 - 25 mg/dL    Creatinine 2.60 (H) 0.40 - 1.60 mg/dL    eGFR 19 (L) >60 mL/min/1.73m*2    Calcium 8.8 8.5 - 10.4 mg/dL    Albumin 2.8 (L) 3.5 - 5.0 g/dL    Alkaline Phosphatase 120 35 - 125 U/L    Total Protein 5.1 (L) 5.9 - 7.9 g/dL    AST 26 5 - 40 U/L    Bilirubin, Total 1.5 (H) 0.1 - 1.2 mg/dL    ALT 9 5 - 40 U/L    Anion Gap 15 <=19 mmol/L   Magnesium   Result Value Ref Range    Magnesium 1.80 1.60 - 3.10 mg/dL   Vancomycin   Result Value Ref  Range    Vancomycin 25.5 (H) 10.0 - 20.0 ug/mL   CBC   Result Value Ref Range    WBC 12.8 (H) 4.4 - 11.3 x10*3/uL    nRBC 0.2 (H) 0.0 - 0.0 /100 WBCs    RBC 3.25 (L) 4.00 - 5.20 x10*6/uL    Hemoglobin 9.6 (L) 12.0 - 16.0 g/dL    Hematocrit 29.8 (L) 36.0 - 46.0 %    MCV 92 80 - 100 fL    MCH 29.5 26.0 - 34.0 pg    MCHC 32.2 32.0 - 36.0 g/dL    RDW 24.5 (H) 11.5 - 14.5 %    Platelets 151 150 - 450 x10*3/uL   Phosphorus   Result Value Ref Range    Phosphorus 2.6 2.5 - 4.5 mg/dL   POCT GLUCOSE   Result Value Ref Range    POCT Glucose 167 (H) 74 - 99 mg/dL   POCT GLUCOSE   Result Value Ref Range    POCT Glucose 116 (H) 74 - 99 mg/dL   POCT GLUCOSE   Result Value Ref Range    POCT Glucose 131 (H) 74 - 99 mg/dL   POCT GLUCOSE   Result Value Ref Range    POCT Glucose 130 (H) 74 - 99 mg/dL   Comprehensive metabolic panel   Result Value Ref Range    Glucose 134 (H) 65 - 99 mg/dL    Sodium 134 133 - 145 mmol/L    Potassium 3.5 3.4 - 5.1 mmol/L    Chloride 98 97 - 107 mmol/L    Bicarbonate 23 (L) 24 - 31 mmol/L    Urea Nitrogen 7 (L) 8 - 25 mg/dL    Creatinine 1.90 (H) 0.40 - 1.60 mg/dL    eGFR 28 (L) >60 mL/min/1.73m*2    Calcium 8.7 8.5 - 10.4 mg/dL    Albumin 3.1 (L) 3.5 - 5.0 g/dL    Alkaline Phosphatase 162 (H) 35 - 125 U/L    Total Protein 5.1 (L) 5.9 - 7.9 g/dL    AST 29 5 - 40 U/L    Bilirubin, Total 1.8 (H) 0.1 - 1.2 mg/dL    ALT 13 5 - 40 U/L    Anion Gap 13 <=19 mmol/L   Magnesium   Result Value Ref Range    Magnesium 1.90 1.60 - 3.10 mg/dL   CBC   Result Value Ref Range    WBC 12.0 (H) 4.4 - 11.3 x10*3/uL    nRBC 0.0 0.0 - 0.0 /100 WBCs    RBC 3.27 (L) 4.00 - 5.20 x10*6/uL    Hemoglobin 9.5 (L) 12.0 - 16.0 g/dL    Hematocrit 29.7 (L) 36.0 - 46.0 %    MCV 91 80 - 100 fL    MCH 29.1 26.0 - 34.0 pg    MCHC 32.0 32.0 - 36.0 g/dL    RDW 25.0 (H) 11.5 - 14.5 %    Platelets 137 (L) 150 - 450 x10*3/uL   Phosphorus   Result Value Ref Range    Phosphorus 2.0 (L) 2.5 - 4.5 mg/dL   POCT GLUCOSE   Result Value Ref Range    POCT  Glucose 146 (H) 74 - 99 mg/dL   POCT GLUCOSE   Result Value Ref Range    POCT Glucose 151 (H) 74 - 99 mg/dL   POCT GLUCOSE   Result Value Ref Range    POCT Glucose 152 (H) 74 - 99 mg/dL   POCT GLUCOSE   Result Value Ref Range    POCT Glucose 78 74 - 99 mg/dL   Comprehensive metabolic panel   Result Value Ref Range    Glucose 100 (H) 65 - 99 mg/dL    Sodium 134 133 - 145 mmol/L    Potassium 4.0 3.4 - 5.1 mmol/L    Chloride 99 97 - 107 mmol/L    Bicarbonate 22 (L) 24 - 31 mmol/L    Urea Nitrogen 9 8 - 25 mg/dL    Creatinine 2.50 (H) 0.40 - 1.60 mg/dL    eGFR 20 (L) >60 mL/min/1.73m*2    Calcium 8.4 (L) 8.5 - 10.4 mg/dL    Albumin 2.7 (L) 3.5 - 5.0 g/dL    Alkaline Phosphatase 193 (H) 35 - 125 U/L    Total Protein 4.9 (L) 5.9 - 7.9 g/dL    AST 50 (H) 5 - 40 U/L    Bilirubin, Total 1.6 (H) 0.1 - 1.2 mg/dL    ALT 15 5 - 40 U/L    Anion Gap 13 <=19 mmol/L   Magnesium   Result Value Ref Range    Magnesium 1.70 1.60 - 3.10 mg/dL   CBC   Result Value Ref Range    WBC 14.6 (H) 4.4 - 11.3 x10*3/uL    nRBC 0.0 0.0 - 0.0 /100 WBCs    RBC 3.17 (L) 4.00 - 5.20 x10*6/uL    Hemoglobin 9.4 (L) 12.0 - 16.0 g/dL    Hematocrit 29.2 (L) 36.0 - 46.0 %    MCV 92 80 - 100 fL    MCH 29.7 26.0 - 34.0 pg    MCHC 32.2 32.0 - 36.0 g/dL    RDW 24.6 (H) 11.5 - 14.5 %    Platelets 139 (L) 150 - 450 x10*3/uL   Phosphorus   Result Value Ref Range    Phosphorus 2.4 (L) 2.5 - 4.5 mg/dL   POCT GLUCOSE   Result Value Ref Range    POCT Glucose 87 74 - 99 mg/dL   POCT GLUCOSE   Result Value Ref Range    POCT Glucose 103 (H) 74 - 99 mg/dL   POCT GLUCOSE   Result Value Ref Range    POCT Glucose 119 (H) 74 - 99 mg/dL   POCT GLUCOSE   Result Value Ref Range    POCT Glucose 132 (H) 74 - 99 mg/dL   POCT GLUCOSE   Result Value Ref Range    POCT Glucose 157 (H) 74 - 99 mg/dL   Phosphorus   Result Value Ref Range    Phosphorus 3.1 2.5 - 4.5 mg/dL   CBC   Result Value Ref Range    WBC 16.2 (H) 4.4 - 11.3 x10*3/uL    nRBC 0.0 0.0 - 0.0 /100 WBCs    RBC 3.20 (L) 4.00  - 5.20 x10*6/uL    Hemoglobin 9.4 (L) 12.0 - 16.0 g/dL    Hematocrit 29.7 (L) 36.0 - 46.0 %    MCV 93 80 - 100 fL    MCH 29.4 26.0 - 34.0 pg    MCHC 31.6 (L) 32.0 - 36.0 g/dL    RDW 24.7 (H) 11.5 - 14.5 %    Platelets 141 (L) 150 - 450 x10*3/uL   Comprehensive metabolic panel   Result Value Ref Range    Glucose 150 (H) 65 - 99 mg/dL    Sodium 131 (L) 133 - 145 mmol/L    Potassium 3.7 3.4 - 5.1 mmol/L    Chloride 97 97 - 107 mmol/L    Bicarbonate 19 (L) 24 - 31 mmol/L    Urea Nitrogen 12 8 - 25 mg/dL    Creatinine 3.20 (H) 0.40 - 1.60 mg/dL    eGFR 15 (L) >60 mL/min/1.73m*2    Calcium 8.6 8.5 - 10.4 mg/dL    Albumin 2.6 (L) 3.5 - 5.0 g/dL    Alkaline Phosphatase 183 (H) 35 - 125 U/L    Total Protein 4.8 (L) 5.9 - 7.9 g/dL    AST 34 5 - 40 U/L    Bilirubin, Total 1.7 (H) 0.1 - 1.2 mg/dL    ALT 14 5 - 40 U/L    Anion Gap 15 <=19 mmol/L   Magnesium   Result Value Ref Range    Magnesium 1.70 1.60 - 3.10 mg/dL   POCT GLUCOSE   Result Value Ref Range    POCT Glucose 143 (H) 74 - 99 mg/dL         SIGNATURE: Saeed Mondragon MD PATIENT NAME: Ayanna Gross   DATE: January 29, 2024 MRN: [id number]   TIME: 11:34 AM PAGER: [phone]

## 2024-01-30 NOTE — PROGRESS NOTES
[facility] Critical Care Medicine       Date:  1/30/2024  Patient:  Ayanna Gross  YOB: 1950  MRN:  [id number]   Admit Date:  1/19/2024  ========================================================================================================    Chief Complaint   Patient presents with    Shortness of Breath     Patient with SOB and fever. Is very lethargic. Dialysis MWF but did not go today dt SOB and fever         History of Present Illness:  Ayanna Gross is a 73 y.o. year old female patient with Past Medical History of  esenting with anemia, fever worsening sacral and lower extremity wounds and concerns from the nursing facility for left pneumonia. Patient presents to the emergency department found to be febrile she was hypotensive but responded to 500 cc fluid bolus she was found to have a hemoglobin of 6 she was begun and ordered for a single unit of blood transfusion will actually provide 2 units of packed cells additional IV fluids to complete her volume resuscitation given her fever clinical evidence of hypovolemia and infection with an elevated lactate of 2.3 she received a total of approximately 1800 mL of fluid she is DNR/DNI by advanced directives but ICU level care is acceptable her granddaughter questions disorder but it was confirmed and signed by her  who is her POA.  She will be admitted to the intensive care unit given her frail compromised state and high risk of morbidity and mortality necessitating IV antibiotics fluids blood transfusion and ultimately will receive dialysis likely tomorrow wound care consultation to general surgery has been requested as well as consultation with the intensivist infectious disease and nephrology wounds are cultured C. difficile is requested she remains on oral vancomycin for suppression given recent C. difficile infection in December she is initiated on pharmacy dosing of vancomycin and Zosyn 2.25 g IV every 6 hours. She continues on Eliquis  at this time for DVT prophylaxis as well as atrial fibrillation.     Further Hx: Ayanna Gross 73-year-old female with past medical history of atrial fibrillation, end-stage renal disease, C. difficile infection, moderate sized recurrent left pleural effusions, and MRSA bacteremia with mitral valve vegetation admitted to ICU for management of septic shock.  On morning exam patient was on 0.22 of levo just to maintain a MAP of 60 and SBP of 88.  Last documented echocardiogram from [facility] shows EF between 55 to 60% with grade 2 left ventricular diastolic dysfunction, and moderate circumferential pericardial effusion.  Unable to find adequate documentation of who treated her endocarditis, but there was documentation that she was currently still receiving treatment when she was admitted to [facility]. On further chart review Dr. Coulter is her infectious disease doctor. She also had 2 thoracentesis's for moderate to large sided left pleural effusions with cytology showing no malignancy, but unable to differentiate between exudative and transudative at this time.        Interval ICU Events:  1/20: Pt missed last dialysis appointment, no signs of urgent need today, but nephrology Dr. Cabello is following. Pt on high requirements of levophed. She is on broad spectrum abx for coverage currently ID Dr. Coulter following. Pt switches from being A&O x 3 and lethargy. Goal today is to get more central access, david, and thoracentesis.      1/21: Pt more lethargic this AM requiring uptitration of her Levophed to 0.28. Diagnostic and therapeutic thoracentesis yesterday over 1L of hazy straw color fluid. Currently still on vanc, zosyn, and doxy for abx. Potential dialysis this today, but no urgency.      1/22: Pt come in and out of lethargic states throughout the day. Still on high doses of levophed this morning. Per nephrologist Dr. Cabello titrate for an SBP goal of 70 and mentation. Left Pleural  fluid analysis resulted as transudative per lights criteria. Tablo today per nephrology. ID managing with empiric coverage. Source control may not have been met. Podiatry, wound, and surgery to evaluate multiple wounds over the body.      1/23: Pt received dialysis via tablo yesterday. Very similar status as yesterday from lethargic states to well mentation. Patient remains on Levophed still at 0.36 with empiric coverage with vanc + zosyn & doxy. Increase in patients total bilirubin today 0.9-1.6. Consulting services include: Wound, Vascular, Podiatry, Acute surgery, Palliative, Infectious Disease, and Nephrology.      1/24: Patient stable overnight, norepinephrine weaned down to 0.16 with stable pressures and new goal of systolics > 70     1/25: Patient resting comfortably in bed this morning requiring 0.05 of Levophed for systolic goal of 70. She is more alert than prior and complains of less pain. She will receive Tablo today with 1.5L of fluid removed per Nephrology.      1/26: Patient resting comfortably in bed this morning requiring 0.06 Levophed and midodrine 15 mg TID. Tablo yesterday with 1.5L removed. Empiric abx stopped 2 days ago. SLP signed off with recommendations.       1/27: Patient resting comfortable in bed this morning requiring 0.06 of levophed and midodrine 15 mg TID. Potential Tablo today. No acute events overnight. Patient remains at baseline.     1/29: No acute events overnight.  Pt resting comfortable in bed this morning requiring 0.04 of levophed and midodrine 15mg TID. Plan for dialysis via tablo today per nephrology.      1/30: Pt requiring increased levophed overnight at .12 and down do .09 this morning.  Dialysis via tablo yesterday with -1L.  Pt started on fludrocortisone 0.1 tid.  Spent extensive time with the patient discussing goals of care and plan for a family meeting tomorrow.     Medical History:  Past Medical History:   Diagnosis Date    Asthma     CAD (coronary artery disease)      CHF (congestive heart failure) (CMS/HCC)     Chronic kidney disease on chronic dialysis (CMS/HCC)     Hypertension     Personal history of diseases of the blood and blood-forming organs and certain disorders involving the immune mechanism     History of autoimmune disorder    Sleep apnea     Type 2 diabetes mellitus without complications (CMS/[facility]) 09/15/2022    Diabetes mellitus     Past Surgical History:   Procedure Laterality Date    CARPAL TUNNEL RELEASE  11/18/2013    Neuroplasty Decompression Median Nerve At Carpal Tunnel    HAND SURGERY  11/18/2013    Hand Surgery                                                                                                                                                          MASTECTOMY, PARTIAL  04/10/2014    Right Breast Partial Mastectomy    MR HEAD ANGIO WO IV CONTRAST  8/29/2023    MR HEAD ANGIO WO IV CONTRAST [facility]    OTHER SURGICAL HISTORY  11/18/2013    Breast Reconstruction With Implant Prosthesis    OTHER SURGICAL HISTORY  11/18/2013    Thyroid Surgery Substernal Thyroidectomy Partial    OTHER SURGICAL HISTORY  11/18/2013    Modified Radical Mastectomy Left Breast    OTHER SURGICAL HISTORY  04/04/2022    Carpal tunnel surgery    OTHER SURGICAL HISTORY  04/04/2022    Foot surgery    OTHER SURGICAL HISTORY  04/04/2022    Hernia repair    SENTINEL LYMPH NODE BIOPSY  04/10/2014    [place] Lymph Node Biopsy    TONSILLECTOMY  11/18/2013    Tonsillectomy    UMBILICAL HERNIA REPAIR  11/18/2013    Umbilical Hernia Repair    US GUIDED PERCUTANEOUS PLACEMENT  6/29/2023    US GUIDED PERCUTANEOUS PLACEMENT [facility]     Medications Prior to Admission   Medication Sig Dispense Refill Last Dose    apixaban (Eliquis) 2.5 mg tablet Take 1 tablet (2.5 mg) by mouth 2 times a day.       blood sugar diagnostic strip 1 x daily e11.65 for 90 days       blood-glucose sensor (Dexcom G6 Sensor) device Check blood sugar when needed and as instructed 6 each 3      doxycycline (Vibramycin) 100 mg capsule Take 1 capsule (100 mg) by mouth once daily. Take with at least 8 ounces (large glass) of water, do not lie down for 30 minutes after Do not start before January 6, 2024.       escitalopram (Lexapro) 10 mg tablet 1 tablet Orally Once a day for 30 day(s)       febuxostat (Uloric) 40 mg tablet TAKE ONE TABLET BY MOUTH EVERY OTHER DAY       fenofibrate (Triglide) 160 mg tablet Take 1 tablet (160 mg) by mouth once daily.       gabapentin (Neurontin) 100 mg capsule Take 1 capsule (100 mg) by mouth 2 times a day.       insulin lispro (HumaLOG) 100 unit/mL injection Inject 0-0.1 mL (0-10 Units) under the skin 3 times a day with meals. Take as directed per insulin instructions.       midodrine (Proamatine) 5 mg tablet Take 3 tablets (15 mg) by mouth 3 times a day with meals.       nystatin (Mycostatin) 100,000 unit/gram powder APPLY 1 GRAM TO SKIN TWO TIMES A DAY 30 g 0     oxyCODONE-acetaminophen (Percocet) 5-325 mg tablet Take 1 tablet by mouth every 12 hours if needed for severe pain (7 - 10). 120 tablet 0     simvastatin (Zocor) 20 mg tablet TAKE 1 TABLET BY MOUTH DAILY 60 tablet 5      Patient has no known allergies.  Social History     Tobacco Use    Smoking status: Never    Smokeless tobacco: Never   Substance Use Topics    Alcohol use: Not Currently    Drug use: Never     Family History   Problem Relation Name Age of Onset    Lymphoma Mother      Prostate cancer Father          passed away fabiana min 2017       Review of Systems:  14 point review of systems was completed and negative except for those specially mention in my HPI    Physical Exam:    Physical Exam  Constitutional:       Appearance: She is ill-appearing.   HENT:      Head: Normocephalic.      Mouth/Throat:      Mouth: Mucous membranes are moist.      Pharynx: Oropharynx is clear.   Eyes:      Extraocular Movements: Extraocular movements intact.      Pupils: Pupils are equal, round, and reactive to light.    Cardiovascular:      Rate and Rhythm: Normal rate and regular rhythm.      Pulses: Normal pulses.      Heart sounds: Normal heart sounds.   Pulmonary:      Effort: Pulmonary effort is normal.      Breath sounds: Wheezing and rales present.   Abdominal:      General: Bowel sounds are normal.      Palpations: Abdomen is soft.   Musculoskeletal:         General: Swelling present.      Cervical back: Normal range of motion.      Right lower leg: Edema present.      Left lower leg: Edema present.   Skin:     General: Skin is warm.      Capillary Refill: Capillary refill takes more than 3 seconds.      Findings: Bruising and erythema present.      Comments: Ulcers on bilateral heels and left shin.  Ulcer on sacrum.  Skin tear to Left hand.  Edema to bilateral uppers but R>L.      Neurological:      Mental Status: She is alert and oriented to person, place, and time.      Motor: Weakness present.   Psychiatric:         Mood and Affect: Mood normal.         Behavior: Behavior normal.         Vitals:  Most recent :  Vitals:    01/30/24 1100   BP:    Pulse: 101   Resp: 15   Temp: 36.1 °C (97 °F)   SpO2: 91%       24hr Min/Max:  Temp  Min: 35.9 °C (96.6 °F)  Max: 36.7 °C (98.1 °F)  Pulse  Min: 97  Max: 112  BP  Min: 52/40  Max: 104/53  Resp  Min: 12  Max: 32  SpO2  Min: 91 %  Max: 100 %      Objective:    Scheduled Medications:  acetaminophen, 650 mg, oral, TID  apixaban, 2.5 mg, oral, BID  doxycylcine, 100 mg, oral, Daily  escitalopram, 10 mg, oral, Nightly  fludrocortisone, 0.1 mg, oral, TID  gabapentin, 100 mg, oral, BID  heparin, 2,000 Units, intra-catheter, After Dialysis  heparin, 2,000 Units, intra-catheter, After Dialysis  insulin lispro, 0-10 Units, subcutaneous, TID with meals  ipratropium-albuteroL, 3 mL, nebulization, q6h while awake  midodrine, 15 mg, oral, TID with meals  nystatin, , Topical, BID  nystatin, , Topical, BID  pantoprazole, 40 mg, oral, Daily   Or  pantoprazole, 40 mg, intravenous,  Daily  potassium, sodium phosphates, 1 packet, oral, With meals & nightly  sodium chloride, 3 mL, nebulization, TID  vancomycin, 125 mg, oral, Daily  zinc oxide, 1 Application, Topical, BID        Continuous Medications:  norepinephrine, 0.01-3 mcg/kg/min, Last Rate: 0.09 mcg/kg/min (01/30/24 1016)        PRN Medications:  PRN medications: dextrose 10 % in water (D10W), dextrose, glucagon, oxygen, sennosides-docusate sodium    Labs/Radiology/Diagnostic Review:  Results for orders placed or performed during the hospital encounter of 01/19/24 (from the past 24 hour(s))   POCT GLUCOSE   Result Value Ref Range    POCT Glucose 126 (H) 74 - 99 mg/dL   POCT GLUCOSE   Result Value Ref Range    POCT Glucose 141 (H) 74 - 99 mg/dL   CBC   Result Value Ref Range    WBC 15.6 (H) 4.4 - 11.3 x10*3/uL    nRBC 0.0 0.0 - 0.0 /100 WBCs    RBC 3.35 (L) 4.00 - 5.20 x10*6/uL    Hemoglobin 9.9 (L) 12.0 - 16.0 g/dL    Hematocrit 30.6 (L) 36.0 - 46.0 %    MCV 91 80 - 100 fL    MCH 29.6 26.0 - 34.0 pg    MCHC 32.4 32.0 - 36.0 g/dL    RDW 25.3 (H) 11.5 - 14.5 %    Platelets 169 150 - 450 x10*3/uL   Comprehensive metabolic panel   Result Value Ref Range    Glucose 210 (H) 65 - 99 mg/dL    Sodium 133 133 - 145 mmol/L    Potassium 4.2 3.4 - 5.1 mmol/L    Chloride 98 97 - 107 mmol/L    Bicarbonate 24 24 - 31 mmol/L    Urea Nitrogen 7 (L) 8 - 25 mg/dL    Creatinine 2.20 (H) 0.40 - 1.60 mg/dL    eGFR 23 (L) >60 mL/min/1.73m*2    Calcium 8.3 (L) 8.5 - 10.4 mg/dL    Albumin 2.6 (L) 3.5 - 5.0 g/dL    Alkaline Phosphatase 255 (H) 35 - 125 U/L    Total Protein 5.1 (L) 5.9 - 7.9 g/dL    AST 35 5 - 40 U/L    Bilirubin, Total 1.4 (H) 0.1 - 1.2 mg/dL    ALT 15 5 - 40 U/L    Anion Gap 11 <=19 mmol/L   Magnesium   Result Value Ref Range    Magnesium 1.70 1.60 - 3.10 mg/dL   Phosphorus   Result Value Ref Range    Phosphorus 2.0 (L) 2.5 - 4.5 mg/dL   POCT GLUCOSE   Result Value Ref Range    POCT Glucose 166 (H) 74 - 99 mg/dL   POCT GLUCOSE   Result Value Ref  Range    POCT Glucose 167 (H) 74 - 99 mg/dL      Upper extremity venous duplex bilateral    Result Date: 1/25/2024           [facility] [id number] [place] [address]            Phone [phone]  Vascular Lab Report  [facility] US UPPER EXTREMITY VENOUS DUPLEX BILATERAL Patient Name:      BASIA VEGA      Reading Physician:  [id number] Mini Busby MD, RPVI Study Date:        1/25/2024           Ordering Provider:  [id number] ERI BERMAN MRN/PID:           [id number]            Fellow: Accession#:        [id number]        Technologist:       Dara Parr RVT Date of Birth/Age: 1950 / 73 years Technologist 2: Gender:            F                   Encounter#:         [id number] Admission Status:  Inpatient           Location Performed: [facility]  Diagnosis/ICD: Right arm swelling-M79.89; Left arm swelling-M79.89 CPT Codes:     25687 Peripheral venous duplex scan for DVT complete  CONCLUSIONS:  Right Upper Venous: No evidence of acute deep vein thrombus visualized in the right upper extremity. Internal jugular vein was visualized in segments due to IV lines and bandages. Left Upper Venous: No evidence of acute deep vein thrombus visualized in the left upper extremity. Subclavian stent is noted and appears patent. There is a known occluded dialysis access noted.  Additional Findings: Technically difficult exam due to IV lines, bandages, and patient's positioning.  Imaging & Doppler Findings:  Right               Compressible Thrombus        Flow Internal Jugular        Yes        None   Spontaneous/Phasic Subclavian              Yes        None Subclavian Proximal     Yes        None   Spontaneous/Phasic Subclavian Mid          Yes        None Subclavian Distal       Yes        None   Spontaneous/Phasic Axillary                Yes        None       Pulsatile Brachial                Yes        None Cephalic                 Yes        None Basilic                 Yes        None  Left                Compress Thrombus        Flow Internal Jugular      Yes      None       Pulsatile Subclavian            Yes      None Subclavian Proximal   Yes      None       Pulsatile Subclavian Mid        Yes      None       Pulsatile Subclavian Distal     Yes      None       Pulsatile Axillary              Yes      None   Spontaneous/Phasic Brachial              Yes      None Cephalic              Yes      None Basilic               Yes      None  [id number] Mini Busby MD, RPVI Electronically signed by [id number] Mini Busby MD, RPVI on 1/25/2024 at 4:06:13 PM  ** Final **     ECG 12 lead    Result Date: 1/24/2024  Sinus tachycardia  with 1st degree AV block Right bundle branch block Possible Lateral infarct , age undetermined Abnormal ECG Confirmed by Yesika Nj ([id number]) on 1/24/2024 6:54:45 AM    XR tibia fibula right 2 views    Result Date: 1/22/2024  Interpreted By:  Real Mendieta, STUDY: XR TIBIA FIBULA RIGHT 2 VIEWS; 1/22/2024 2:15 pm   INDICATION: Signs/Symptoms:evaluate source of infection, osteomyelitis;   COMPARISON: None available.   ACCESSION NUMBER(S): [id number]   ORDERING CLINICIAN: BAN GIFFORD   TECHNIQUE: Views: AP and Lateral of the right tibia and fibula   FINDINGS: RESULT: There is no evidence for fracture or dislocation. Tricompartmental degenerative changes of the right knee. The tibia and fibula appear intact without bony erosion or evidence for osteomyelitis. No other bony or soft tissue abnormality is identified. No subcutaneous gas is noted.       No evidence for acute osseous abnormality. No bony erosion to suggest osteomyelitis.   Signed by: Real Mendieta 1/22/2024 3:15 PM Dictation workstation:   [id number]    XR ankle right 3+ views    Result Date: 1/22/2024  Interpreted By:  Real Mendieta, STUDY: XR ANKLE RIGHT 3+ VIEWS; 1/22/2024 2:15 pm   INDICATION: Signs/Symptoms:Evuluate source of infection,  osteomyelitis;   COMPARISON: None available.   ACCESSION NUMBER(S): [id number]   ORDERING CLINICIAN: BAN GIFFORD   TECHNIQUE: Views: AP, Lateral, Oblique, right ankle   FINDINGS: RESULT: There is no evidence for fracture or dislocation. The ankle mortise is intact. Joint spaces appear adequately maintained. No bony erosion to suggest osteomyelitis. No bone lesion or soft tissue abnormality is identified. No subcutaneous gas is seen.       No evidence for acute osseous abnormality. No bony erosion to suggest osteomyelitis.   Signed by: Real Mendieta 1/22/2024 3:14 PM Dictation workstation:   [id number]    Transthoracic Echo (TTE) Complete    Result Date: 1/22/2024           [place] [address]            Phone [phone] TRANSTHORACIC ECHOCARDIOGRAM REPORT  Patient Name:      BASIA Gray Physician:   [id number] Fausto Rowe DO Study Date:        1/22/2024           Ordering Provider:   [id number] ERI BERMAN MRN/PID:           [id number]            Fellow: Accession#:        [id number]        Nurse: Date of Birth/Age: 1950 / 73 years Sonographer:         Tabatha Page RDCS Gender:            F                   Additional Staff: Height:            157.00 cm           Admit Date: Weight:            75.00 kg            Admission Status:    Inpatient - Routine BSA:               1.76 m2             Department Location: [facility] Blood Pressure: 88 /49 mmHg Study Type:    TRANSTHORACIC ECHO (TTE) COMPLETE Diagnosis/ICD: Chronic combined systolic (congestive) and diastolic (congestive)                heart failure (CHF)-I50.42; Sepsis, unspecified organism-A41.9 Indication:    heart failure,septic shock CPT Codes:     Echo Complete w Full Doppler-77535 Patient History: BMI:               Obese >30 Pertinent History: Sepsis, PVD, A-Fib, CVA, Cancer and HTN. breast                     prosthesis/ca, ESRD,anemia,septic shock,heart failure. Study Detail: The following Echo studies were performed: 2D, M-Mode, Doppler and               color flow. Technically challenging study due to prosthesis,               prominent lung artifact and body habitus.  PHYSICIAN INTERPRETATION: Left Ventricle: Left ventricular systolic function is normal, with an estimated ejection fraction of 70%. There are no regional wall motion abnormalities. The left ventricular cavity size is normal. Left ventricular diastolic filling was indeterminate. Left Atrium: The left atrium is moderately dilated. Right Ventricle: The right ventricle is normal in size. There is normal right ventricular global systolic function. Right Atrium: The right atrium is normal in size. Aortic Valve: The aortic valve is trileaflet. There is no evidence of aortic valve regurgitation. The peak instantaneous gradient of the aortic valve is 2.8 mmHg. Mitral Valve: The mitral valve is normal in structure. There is no evidence of mitral valve regurgitation. Tricuspid Valve: The tricuspid valve is structurally normal. There is trace tricuspid regurgitation. Pulmonic Valve: The pulmonic valve is not well visualized. The pulmonic valve regurgitation was not well visualized. Pericardium: There is no pericardial effusion noted. Aorta: The aortic root is normal.  CONCLUSIONS:  1. Left ventricular systolic function is normal with a 70% estimated ejection fraction.  2. The left atrium is moderately dilated.  3. Left ventricular diastolic filling indeterminate. QUANTITATIVE DATA SUMMARY: 2D MEASUREMENTS:                          Normal Ranges: LAs:           4.50 cm   (2.7-4.0cm) IVSd:          0.61 cm   (0.6-1.1cm) LVPWd:         0.88 cm   (0.6-1.1cm) LVIDd:         3.66 cm   (3.9-5.9cm) LV Mass Index: 41.9 g/m2 LV SYSTOLIC FUNCTION BY 2D PLANIMETRY (MOD):                     Normal Ranges: EF-A4C View: 68.3 % (>=55%) LV DIASTOLIC FUNCTION:                      Normal Ranges: MV Peak E: 1.19 m/s (0.7-1.2 m/s) MV Peak A: 0.90 m/s (0.42-0.7 m/s) E/A Ratio: 1.32     (1.0-2.2) MITRAL VALVE:                 Normal Ranges: MV DT: 192 msec (150-240msec) AORTIC VALVE:                         Normal Ranges: AoV Vmax:      0.84 m/s (<=1.7m/s) AoV Peak P.8 mmHg (<20mmHg) LVOT Max Shimon:  0.47 m/s (<=1.1m/s) LVOT Diameter: 1.90 cm  (1.8-2.4cm) AoV Area,Vmax: 1.57 cm2 (2.5-4.5cm2) TRICUSPID VALVE/RVSP:                   Normal Ranges: IVC Diam: 1.05 cm  [id number] Fausto Rowe DO Electronically signed on 2024 at 2:53:04 PM  ** Final **     XR foot left 3+ views    Result Date: 2024  Interpreted By:  Real Mendieta, STUDY: XR FOOT LEFT 1-2 VIEWS; 2024 4:15 pm   INDICATION: Signs/Symptoms:osteomyelitis. Pain   COMPARISON: 2023   ACCESSION NUMBER(S): [id number]   ORDERING CLINICIAN: BAN GIFFORD   TECHNIQUE: Views:  AP, Lat, Oblique, left foot   FINDINGS: RESULT: There is no evidence for fracture or dislocation. 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 Mild hammertoe deformities. Joint spaces appear adequately maintained. Soft tissue ulceration of the posterior aspect of the heel however no evidence for bony erosion to suggest osteomyelitis.       No evidence for acute fracture or acute bony erosion to suggest osteomyelitis. Chronic changes as described.   Signed by: Real Mendieta 2024 7:15 PM Dictation workstation:   [id number]    CT chest abdomen pelvis wo IV contrast    Result Date: 2024  Interpreted By:  Maria Garcia, STUDY: CT CHEST ABDOMEN PELVIS WO CONTRAST;  2024 11:42 pm   INDICATION: Mental status change. Elevated white blood cell count with infectious workup.   COMPARISON: 2023   ACCESSION NUMBER(S): [id number]   ORDERING CLINICIAN: BAN GIFFORD   TECHNIQUE: CT of the chest, abdomen and pelvis was performed with no oral or intravenous contrast administered. Sagittal and coronal reformations were  completed by the technologist at the acquisition scanner.   All CT examinations are performed with 1 or more of the following dose reduction techniques: Automated exposure control, adjustment of mA and/or kv according to patient's size, or use of iterative reconstruction techniques.   FINDINGS: Please note that the study is limited without intravenous contrast.   CHEST: Bilateral pleural effusions are present with right effusion moderately small in size and with the left effusion moderately small in size as well. There is shift of the heart and mediastinum to the left of midline due to atelectasis of the left upper lobe. There is consolidation throughout left lower lobe with air bronchograms noted. On the right, there is compressive atelectasis seen posteriorly in the right lower lobe.   There is mild reactive mediastinal adenopathy seen at this time with mediastinal lymph nodes increased in size since prior study. Several of these mediastinal nodes are at the upper limits of normal measuring 8 mm in short axis diameter.   No pericardial effusion is present. The cardiac size is normal with mitral annulus calcification.   There has been prior bilateral mastectomy with reconstruction of the left breast with implant placement. Surgical clips are visible within the left axilla. Stent grafts are visible within the left upper extremity and within the left innominate, subclavian as well as axillary veins.   A peripherally calcified 1.8 cm nodule arises from the lower pole of left thyroid lobe.   ABDOMEN:   LIVER: Unremarkable.   BILE DUCTS: No intrahepatic or extrahepatic biliary ductal dilatation is seen.   GALLBLADDER: Cholelithiasis is observed with some calcification of the gallbladder wall demonstrated as well. No gallbladder wall thickening or pericholecystic fluid is evident.   PANCREAS: Unremarkable   SPLEEN: Unremarkable   ADRENAL GLANDS: Unremarkable   KIDNEYS AND URETERS: Kidneys are small in size with extensive  renal artery calcification demonstrated.   PELVIS:   BLADDER: Amos catheter is present within the decompressed urinary bladder.   REPRODUCTIVE ORGANS: Calcification of the arcuate arteries within the uterus is seen. There is no uterine enlargement or adnexal mass.   BOWEL: A rectal balloon is seen. There is no abnormal distention of the intestinal tract.   VESSELS: There is atherosclerosis of the thoracoabdominal aorta and iliac arteries with calcification in the walls of the celiac, splenic, hepatic, and mesenteric arteries.   PERITONEUM/RETROPERITONEUM/LYMPH NODES: There is a minimal amount of ascites seen about the liver and spleen with mild presacral edema noted.   ABDOMINAL WALL: There is anasarca involving the soft tissues of the abdomen and pelvis. Postoperative change from ventral hernia repair is seen.   BONES: Bilateral sacroiliitis is seen. There is lumbar dextroscoliosis. Chronic rotator cuff tear is present bilaterally with osteoarthritis of both shoulders, right greater than left.       Chest 1.  Moderately small bilateral pleural effusions with left upper lobe atelectasis and compressive atelectasis seen posteriorly in right lower lobe. A left lower lobe pneumonia is identified. There is extensive airspace consolidation within left lower lobe with air bronchograms observed. Mild mediastinal reactive adenopathy is present as well.   Abdomen-Pelvis 1.  Cholelithiasis without evidence of cholecystitis. 2. Small amount of ascites with mild presacral edema and anasarca within the soft tissues of the abdominal wall. 3. Renal atrophy with extensive vascular calcifications.     MACRO: None   Signed by: Maria Garcia 1/21/2024 8:30 AM Dictation workstation:   [id number]    CT head wo IV contrast    Result Date: 1/21/2024  Interpreted By:  Maria Garcia, STUDY: CT HEAD WO IV CONTRAST 1/20/2024 11:42 pm   INDICATION: Signs/Symptoms:mental status changes   COMPARISON: 12/11/2023   ACCESSION NUMBER(S): [id number]    ORDERING CLINICIAN: BAN GIFFORD   TECHNIQUE: Unenhanced axial images of the brain are completed.   All CT examinations are performed with 1 or more of the following dose reduction techniques: Automated exposure control, adjustment of mA and/or kv according to patient's size, or use of iterative reconstruction techniques.   FINDINGS: Helical unenhanced axial images of the brain demonstrate a mild to moderate degree of ventricular enlargement with proportionate widening of the sulci and sylvian fissures. There is no midline shift, mass effect, extra-axial fluid collection, or acute intracranial hemorrhage. There is diminished density seen in the periventricular white matter indicating chronic microvascular ischemic disease. There is calcified plaque seen within the distal vertebral and internal carotid arteries bilaterally. No calvarial abnormality is seen.       Atrophy and chronic microvascular ischemic disease without acute intracranial process.   Signed by: Maria Garcia 1/21/2024 8:09 AM Dictation workstation:   [id number]    XR chest 1 view    Result Date: 1/20/2024  Interpreted By:  Brendon Perez, STUDY: XR CHEST 1 VIEW; 1/20/2024 5:03 pm   INDICATION: CLINICAL INFORMATION: Signs/Symptoms:Insertion right IJ central line, also left thoracentesis.   COMPARISON: 01/19/2024 at 1338 hours   ACCESSION NUMBER(S): [id number]   ORDERING CLINICIAN: BOZENA ANTONIO   TECHNIQUE: Portable chest one view.   FINDINGS: The cardiac size is indeterminate in view of the AP projection. There is no change in the right-sided dual port central venous catheter. There is a new right internal jugular venous catheter present with the tip at approximately same level as the dual port central venous catheter, overlying the mid to lower right atrium. There is no evidence for pneumothorax. Patient has a history of left thoracentesis without evidence for pneumothorax on the left. There is bilateral basilar alveolar infiltrate and effusions.  Left effusion is decreased compared to the study from 1 day earlier.   Surgical clips are identified within left chest wall. Endovascular stent is identified in the distribution of the left subclavian artery.       1. Status post right-sided central venous catheter placement as described above with the tip overlying the mid to caudal aspect of the right atrium. There is no evidence for pneumothorax. 2. No evidence for left-sided pneumothorax after left-sided thoracentesis. Decrease in the left effusion. 3. Bilateral basilar infiltrates and effusions are present. Follow-up to assure complete clearing is suggested.   MACRO: none   Signed by: Brendon Perez 1/20/2024 5:11 PM Dictation workstation:   [id number]    XR chest 1 view    Result Date: 1/19/2024  Interpreted By:  Real Mendieta, STUDY: XR CHEST 1 VIEW; 1/19/2024 1:38 pm   INDICATION: Signs/Symptoms:dyspnea.   COMPARISON: 12/26/2023   ACCESSION NUMBER(S): [id number]   ORDERING CLINICIAN: EMELY GOLDSMITH   TECHNIQUE: 1 view of the chest was performed.   FINDINGS: There may be a minimal right pleural effusion. Slight prominence of the interstitium otherwise the right lung is clear. Improved appearance of the left lung with improved aeration of the right upper lobe. There is opacification of the left lower lobe possibly due to large pleural effusion which is similar to the prior study. No pneumothorax. Right-sided dual lumen central line catheter with tip at the proximal atrium. The cardiomediastinal silhouette is within normal limits. Left-sided subclavian stents are again noted.       Improved aeration and appearance of the left lung superiorly however there is a persistent large left pleural effusion and opacification of the left lower lobe.   Signed by: Real Mendieta 1/19/2024 1:44 PM Dictation workstation:   [id number]      I/O:    Intake/Output Summary (Last 24 hours) at 1/30/2024 1133  Last data filed at 1/30/2024 0641  Gross per 24 hour   Intake  301.71 ml   Output 1325 ml   Net -1023.29 ml         I have reviewed all medications, laboratory results, and imaging pertinent for today's encounter    Assessment/Plan:    I am currently managing this critically ill patient for the following problems:  #Septic Shock   #ESRD   #Chronic Hypotension   #Recurrent Pleural Effusions   #Vasculopathy   #Multiple Wounds with and with out infections  #Acute Metabolic Encephalopathy  #Metabolic lactic acidosis, resolved   #Hx of recurrent C. Diff infection  #Hx of Atrial fibrillation   #Hx of endocarditis   #Hx of T2 DM   #Hx of anemia of chronic disease     Neuro/Psych/Pain Ctrl/Sedation:  #Acute Metabolic Encephalopathy, resolving   -Pain Control: Tylenol for mild  -CAM Assessment every shift, neuro assessments q4   -Palliative consulted   -Continue home Lexapro and gabapentin  -Sleep/wake cycle hygiene  -Delirium precaution    Respiratory/ENT:  #Recurrent Pleural Effusions   -Currently on 2L NC, denies any shortness of breath  -Continuous pulse oximetry, maintain SpO2 >90%  -Thoracentesis 1/20 over 1L of hazy straw colored fluid, Lights criteria suggest Transudative    -Aggressive pulmonary/bronchial hygiene     Cardiovascular:  #Septic Shock  #Chronic Hypotension   #Vasculopathy   #Hx of Atrial fibrillation   #Hx of endocarditis    -Levophed titrate to maintain SBP > 70, plan to wean levo off when able  -Home midodrine 15 mg TID when mental status is appropriate   -Start fludrocortisone 0.1 TID  -Continue home eliquis   -Continuous cardiac monitoring    GI:  -Regular diet assess mental status before feeding, adding in Magic cup TID and Christian BID   -Flexacil for stools   -BR with senna/colace PRN   -Protonix daily   -Slightly uptrending bilirubin with no signs of jaundice or hepatic failure, Hepatic panel scheduled for am draw    Renal/Volume Status (Intra & Extravascular):  #ESRD   #Metabolic lactic acidosis, resolved   -Nephrology following for dialysis needs,  recommending M/W/F  -Elevated Lactate 2/2 hypoperfusion -resolved  -Conservative fluid management  -Daily BMP, Replete electrolytes as indicated for ESRD      Endocrine  #Hx of T2 DM   -POCT glucose AC/HS  -SSI AC/HS    Infectious Disease:  #Multiple Wounds with and with out infections  #Hx of recurrent C. Diff infection  #Hx of endocarditis   -Doxycycline 100 mg every day for suppressive therapy   -Vancomycin PO for C. Diff prophylaxis   -ID following   -C. Diff and MRSA PCR negative   -Blood cultures from 1/19, negative   -Urine culture from 1/9, positive for proteus mirabilis     Heme/Onc:  #Hx of anemia of chronic disease   -monitor for s/s anemia  -transfuse for hgb <7  -daily CBC  -Duplex upper extremity US negative for UE DVT    Derm/MSK:  #Multiple Wounds with and with out infections  -Surgery consulted   -Wound care consulted for dressing other wounds   -Podiatry consulted  -XR Left ankle not suspicious for osteomyelitis   -XR Right ankle and tib/fib not suspicious for osteomyelitis   -Continue Uloric for gout prophylaxis   -padded pressure points  -ICU skin protocol    Ethics/Code Status:  Full Code     :  DVT Prophylaxis: home eliquis   GI Prophylaxis: PPI  Bowel Regimen: Senna  Diet: Regular   CVC: Right internal jugular removed 1/29   Chandni: Right radial   Amos: None   Restraints: n/a  Dispo: remain in the ICU     Critical Care Time:  55 minutes spent in preparing to see patient (I.e. review of medical records), evaluation of diagnostics (I.e. labs, imaging, etc.), documentation, discussing plan of care with patient/ family/ caregiver, and/ or coordination of care with multidisciplinary team. Time does not include completion of procedure time.        Vinod Livingston PA-C  Pulmonary and Critical Care Medicine   [facility]

## 2024-01-30 NOTE — CARE PLAN
Problem: Pain  Goal: My pain/discomfort is manageable  Outcome: Progressing     Problem: Safety  Goal: Patient will be injury free during hospitalization  Outcome: Progressing  Goal: I will remain free of falls  Outcome: Progressing     Problem: Daily Care  Goal: Daily care needs are met  Outcome: Progressing     Problem: Psychosocial Needs  Goal: Demonstrates ability to cope with hospitalization/illness  Outcome: Progressing  Goal: Collaborate with me, my family, and caregiver to identify my specific goals  Outcome: Progressing     Problem: Discharge Barriers  Goal: My discharge needs are met  Outcome: Progressing     Problem: Fall/Injury  Goal: Not fall by end of shift  Outcome: Progressing  Goal: Be free from injury by end of the shift  Outcome: Progressing  Goal: Verbalize understanding of personal risk factors for fall in the hospital  Outcome: Progressing  Goal: Verbalize understanding of risk factor reduction measures to prevent injury from fall in the home  Outcome: Progressing  Goal: Pace activities to prevent fatigue by end of the shift  Outcome: Progressing     Problem: Skin  Goal: Decreased wound size/increased tissue granulation at next dressing change  Outcome: Progressing  Goal: Participates in plan/prevention/treatment measures  Outcome: Progressing  Goal: Prevent/manage excess moisture  Outcome: Progressing  Goal: Prevent/minimize sheer/friction injuries  Outcome: Progressing  Goal: Promote/optimize nutrition  Outcome: Progressing  Goal: Promote skin healing  Outcome: Progressing     Problem: Pain  Goal: Takes deep breaths with improved pain control throughout the shift  Outcome: Progressing  Goal: Turns in bed with improved pain control throughout the shift  Outcome: Progressing  Goal: Walks with improved pain control throughout the shift  Outcome: Progressing  Goal: Performs ADL's with improved pain control throughout shift  Outcome: Progressing  Goal: Participates in PT with improved pain  control throughout the shift  Outcome: Progressing  Goal: Free from opioid side effects throughout the shift  Outcome: Progressing  Goal: Free from acute confusion related to pain meds throughout the shift  Outcome: Progressing     Problem: Respiratory  Goal: Clear secretions with interventions this shift  Outcome: Progressing  Goal: Minimize anxiety/maximize coping throughout shift  Outcome: Progressing  Goal: Minimal/no exertional discomfort or dyspnea this shift  Outcome: Progressing  Goal: No signs of respiratory distress (eg. Use of accessory muscles. Peds grunting)  Outcome: Progressing  Goal: Patent airway maintained this shift  Outcome: Progressing  Goal: Tolerate pulmonary toileting this shift  Outcome: Progressing  Goal: Verbalize decreased shortness of breath this shift  Outcome: Progressing  Goal: Wean oxygen to maintain O2 saturation per order/standard this shift  Outcome: Progressing  Goal: Increase self care and/or family involvement in next 24 hours  Outcome: Progressing     Problem: Chronic Conditions and Co-morbidities  Goal: Patient's chronic conditions and co-morbidity symptoms are monitored and maintained or improved  Outcome: Progressing     Problem: Discharge Planning  Goal: Discharge to home or other facility with appropriate resources  Outcome: Progressing             The patient's goals for the shift include rest    The clinical goals for the shift include wean off pressors as tolerated

## 2024-01-30 NOTE — PROGRESS NOTES
Patient not medically clear. Patient, her family, palliative and the attending are having a meeting tomorrow at 1630 regarding goals of care. At this time there is not a safe discharge plan in place. Will follow.     **PATIENT DOES NOT HAVE A SAFE DISCHARGE PLAN      Shauna Jensen RN

## 2024-01-30 NOTE — PROGRESS NOTES
CONSULT PROGRESS NOTES    SERVICE DATE: 1/30/2024   SERVICE TIME: 11:48 AM    CONSULTING SERVICE: Nephrology    ASSESSMENT AND PLAN   1.  End-stage renal disease  2.  Hypotension  3.  Hyponatremia  4.  Hypokalemia  5.  Anemia of chronic kidney disease     Dialysis probably tomorrow with Tablo again with low temperature, and attempting gentle fluid removal.    She is extremely malnourished with low albumin, low prealbumin, no major muscle mass.  Hyponatremia from volume overload in this patient with oliguria.  For her hypokalemia, we are using a 4K bath.  Hemoglobin is close to goal.  Holding albumin.  Given the chronic hypotension, consider fludrocortisone, though I do not think this will work.  Attempting to wean from pressors with a target systolic blood pressure greater than 70.  Typically, she has no major mental status changes with a blood pressure above 70.    She has been getting regular dialysis and I do not suspect she is uremic at this point.  Challenging case, lots of major medical problems, profound hypotension requiring pressors.  Doubtful HD will be needed this weekend.  Use a Monday, Wednesday, Friday hemodialysis schedule.  Arrangements for LTAC sounds appropriate.  Case discussed with Dr. Barreto.  I do not think she benefits from CRRT.    SUBJECTIVE  INTERVAL HPI: She remains hypotensive, the pressor requirement seemingly has worsened overnight.  She did tolerate gentle volume removal yesterday on dialysis.  She remains on a low-dose of supplemental oxygen.  He is not overly talkative today.    MEDICATIONS:  acetaminophen, 650 mg, oral, TID  apixaban, 2.5 mg, oral, BID  doxycylcine, 100 mg, oral, Daily  escitalopram, 10 mg, oral, Nightly  fludrocortisone, 0.1 mg, oral, TID  gabapentin, 100 mg, oral, BID  heparin, 2,000 Units, intra-catheter, After Dialysis  heparin, 2,000 Units, intra-catheter, After Dialysis  insulin lispro, 0-10 Units, subcutaneous, TID with meals  ipratropium-albuteroL, 3 mL,  nebulization, q6h while awake  midodrine, 15 mg, oral, TID with meals  nystatin, , Topical, BID  nystatin, , Topical, BID  pantoprazole, 40 mg, oral, Daily   Or  pantoprazole, 40 mg, intravenous, Daily  potassium, sodium phosphates, 1 packet, oral, With meals & nightly  sodium chloride, 3 mL, nebulization, TID  vancomycin, 125 mg, oral, Daily  zinc oxide, 1 Application, Topical, BID       norepinephrine, 0.01-3 mcg/kg/min, Last Rate: 0.09 mcg/kg/min (01/30/24 1016)       PRN medications: dextrose 10 % in water (D10W), dextrose, glucagon, oxygen, sennosides-docusate sodium     OBJECTIVE  PHYSICAL EXAM:   Heart Rate:  []   Temp:  [36.1 °C (97 °F)-36.7 °C (98.1 °F)]   Resp:  [12-32]   BP: ()/(13-55)   Weight:  [100 kg (221 lb 5.5 oz)]   SpO2:  [91 %-100 %]   Body mass index is 40.48 kg/m².  Chronically ill-appearing elderly white woman  Pale skin  Right-sided hand swelling  Left-sided finger amputation  Internal jugular tunneled hemodialysis catheter in place  There is no significant pretibial edema on both lower extremities  Soft abdomen  No Amos  No obvious joint deformities  Moist mucosa  Hearing seems to be intact  Phonation intact    DATA:   Labs:  Results for orders placed or performed during the hospital encounter of 01/19/24 (from the past 96 hour(s))   POCT GLUCOSE   Result Value Ref Range    POCT Glucose 131 (H) 74 - 99 mg/dL   POCT GLUCOSE   Result Value Ref Range    POCT Glucose 130 (H) 74 - 99 mg/dL   Comprehensive metabolic panel   Result Value Ref Range    Glucose 134 (H) 65 - 99 mg/dL    Sodium 134 133 - 145 mmol/L    Potassium 3.5 3.4 - 5.1 mmol/L    Chloride 98 97 - 107 mmol/L    Bicarbonate 23 (L) 24 - 31 mmol/L    Urea Nitrogen 7 (L) 8 - 25 mg/dL    Creatinine 1.90 (H) 0.40 - 1.60 mg/dL    eGFR 28 (L) >60 mL/min/1.73m*2    Calcium 8.7 8.5 - 10.4 mg/dL    Albumin 3.1 (L) 3.5 - 5.0 g/dL    Alkaline Phosphatase 162 (H) 35 - 125 U/L    Total Protein 5.1 (L) 5.9 - 7.9 g/dL    AST 29 5 - 40  U/L    Bilirubin, Total 1.8 (H) 0.1 - 1.2 mg/dL    ALT 13 5 - 40 U/L    Anion Gap 13 <=19 mmol/L   Magnesium   Result Value Ref Range    Magnesium 1.90 1.60 - 3.10 mg/dL   CBC   Result Value Ref Range    WBC 12.0 (H) 4.4 - 11.3 x10*3/uL    nRBC 0.0 0.0 - 0.0 /100 WBCs    RBC 3.27 (L) 4.00 - 5.20 x10*6/uL    Hemoglobin 9.5 (L) 12.0 - 16.0 g/dL    Hematocrit 29.7 (L) 36.0 - 46.0 %    MCV 91 80 - 100 fL    MCH 29.1 26.0 - 34.0 pg    MCHC 32.0 32.0 - 36.0 g/dL    RDW 25.0 (H) 11.5 - 14.5 %    Platelets 137 (L) 150 - 450 x10*3/uL   Phosphorus   Result Value Ref Range    Phosphorus 2.0 (L) 2.5 - 4.5 mg/dL   POCT GLUCOSE   Result Value Ref Range    POCT Glucose 146 (H) 74 - 99 mg/dL   POCT GLUCOSE   Result Value Ref Range    POCT Glucose 151 (H) 74 - 99 mg/dL   POCT GLUCOSE   Result Value Ref Range    POCT Glucose 152 (H) 74 - 99 mg/dL   POCT GLUCOSE   Result Value Ref Range    POCT Glucose 78 74 - 99 mg/dL   Comprehensive metabolic panel   Result Value Ref Range    Glucose 100 (H) 65 - 99 mg/dL    Sodium 134 133 - 145 mmol/L    Potassium 4.0 3.4 - 5.1 mmol/L    Chloride 99 97 - 107 mmol/L    Bicarbonate 22 (L) 24 - 31 mmol/L    Urea Nitrogen 9 8 - 25 mg/dL    Creatinine 2.50 (H) 0.40 - 1.60 mg/dL    eGFR 20 (L) >60 mL/min/1.73m*2    Calcium 8.4 (L) 8.5 - 10.4 mg/dL    Albumin 2.7 (L) 3.5 - 5.0 g/dL    Alkaline Phosphatase 193 (H) 35 - 125 U/L    Total Protein 4.9 (L) 5.9 - 7.9 g/dL    AST 50 (H) 5 - 40 U/L    Bilirubin, Total 1.6 (H) 0.1 - 1.2 mg/dL    ALT 15 5 - 40 U/L    Anion Gap 13 <=19 mmol/L   Magnesium   Result Value Ref Range    Magnesium 1.70 1.60 - 3.10 mg/dL   CBC   Result Value Ref Range    WBC 14.6 (H) 4.4 - 11.3 x10*3/uL    nRBC 0.0 0.0 - 0.0 /100 WBCs    RBC 3.17 (L) 4.00 - 5.20 x10*6/uL    Hemoglobin 9.4 (L) 12.0 - 16.0 g/dL    Hematocrit 29.2 (L) 36.0 - 46.0 %    MCV 92 80 - 100 fL    MCH 29.7 26.0 - 34.0 pg    MCHC 32.2 32.0 - 36.0 g/dL    RDW 24.6 (H) 11.5 - 14.5 %    Platelets 139 (L) 150 - 450  x10*3/uL   Phosphorus   Result Value Ref Range    Phosphorus 2.4 (L) 2.5 - 4.5 mg/dL   POCT GLUCOSE   Result Value Ref Range    POCT Glucose 87 74 - 99 mg/dL   POCT GLUCOSE   Result Value Ref Range    POCT Glucose 103 (H) 74 - 99 mg/dL   POCT GLUCOSE   Result Value Ref Range    POCT Glucose 119 (H) 74 - 99 mg/dL   POCT GLUCOSE   Result Value Ref Range    POCT Glucose 132 (H) 74 - 99 mg/dL   POCT GLUCOSE   Result Value Ref Range    POCT Glucose 157 (H) 74 - 99 mg/dL   Phosphorus   Result Value Ref Range    Phosphorus 3.1 2.5 - 4.5 mg/dL   CBC   Result Value Ref Range    WBC 16.2 (H) 4.4 - 11.3 x10*3/uL    nRBC 0.0 0.0 - 0.0 /100 WBCs    RBC 3.20 (L) 4.00 - 5.20 x10*6/uL    Hemoglobin 9.4 (L) 12.0 - 16.0 g/dL    Hematocrit 29.7 (L) 36.0 - 46.0 %    MCV 93 80 - 100 fL    MCH 29.4 26.0 - 34.0 pg    MCHC 31.6 (L) 32.0 - 36.0 g/dL    RDW 24.7 (H) 11.5 - 14.5 %    Platelets 141 (L) 150 - 450 x10*3/uL   Comprehensive metabolic panel   Result Value Ref Range    Glucose 150 (H) 65 - 99 mg/dL    Sodium 131 (L) 133 - 145 mmol/L    Potassium 3.7 3.4 - 5.1 mmol/L    Chloride 97 97 - 107 mmol/L    Bicarbonate 19 (L) 24 - 31 mmol/L    Urea Nitrogen 12 8 - 25 mg/dL    Creatinine 3.20 (H) 0.40 - 1.60 mg/dL    eGFR 15 (L) >60 mL/min/1.73m*2    Calcium 8.6 8.5 - 10.4 mg/dL    Albumin 2.6 (L) 3.5 - 5.0 g/dL    Alkaline Phosphatase 183 (H) 35 - 125 U/L    Total Protein 4.8 (L) 5.9 - 7.9 g/dL    AST 34 5 - 40 U/L    Bilirubin, Total 1.7 (H) 0.1 - 1.2 mg/dL    ALT 14 5 - 40 U/L    Anion Gap 15 <=19 mmol/L   Magnesium   Result Value Ref Range    Magnesium 1.70 1.60 - 3.10 mg/dL   POCT GLUCOSE   Result Value Ref Range    POCT Glucose 143 (H) 74 - 99 mg/dL   POCT GLUCOSE   Result Value Ref Range    POCT Glucose 126 (H) 74 - 99 mg/dL   POCT GLUCOSE   Result Value Ref Range    POCT Glucose 141 (H) 74 - 99 mg/dL   CBC   Result Value Ref Range    WBC 15.6 (H) 4.4 - 11.3 x10*3/uL    nRBC 0.0 0.0 - 0.0 /100 WBCs    RBC 3.35 (L) 4.00 - 5.20 x10*6/uL     Hemoglobin 9.9 (L) 12.0 - 16.0 g/dL    Hematocrit 30.6 (L) 36.0 - 46.0 %    MCV 91 80 - 100 fL    MCH 29.6 26.0 - 34.0 pg    MCHC 32.4 32.0 - 36.0 g/dL    RDW 25.3 (H) 11.5 - 14.5 %    Platelets 169 150 - 450 x10*3/uL   Comprehensive metabolic panel   Result Value Ref Range    Glucose 210 (H) 65 - 99 mg/dL    Sodium 133 133 - 145 mmol/L    Potassium 4.2 3.4 - 5.1 mmol/L    Chloride 98 97 - 107 mmol/L    Bicarbonate 24 24 - 31 mmol/L    Urea Nitrogen 7 (L) 8 - 25 mg/dL    Creatinine 2.20 (H) 0.40 - 1.60 mg/dL    eGFR 23 (L) >60 mL/min/1.73m*2    Calcium 8.3 (L) 8.5 - 10.4 mg/dL    Albumin 2.6 (L) 3.5 - 5.0 g/dL    Alkaline Phosphatase 255 (H) 35 - 125 U/L    Total Protein 5.1 (L) 5.9 - 7.9 g/dL    AST 35 5 - 40 U/L    Bilirubin, Total 1.4 (H) 0.1 - 1.2 mg/dL    ALT 15 5 - 40 U/L    Anion Gap 11 <=19 mmol/L   Magnesium   Result Value Ref Range    Magnesium 1.70 1.60 - 3.10 mg/dL   Phosphorus   Result Value Ref Range    Phosphorus 2.0 (L) 2.5 - 4.5 mg/dL   POCT GLUCOSE   Result Value Ref Range    POCT Glucose 166 (H) 74 - 99 mg/dL   POCT GLUCOSE   Result Value Ref Range    POCT Glucose 167 (H) 74 - 99 mg/dL         SIGNATURE: Saeed Mondragon MD PATIENT NAME: Ayanna Gross   DATE: January 30, 2024 MRN: [id number]   TIME: 11:48 AM PAGER: [phone]

## 2024-01-30 NOTE — PROGRESS NOTES
"Ayanna Gross is a 73 y.o. female on day 11 of admission presenting with Septic shock (CMS/HCC).    Subjective   Remains on Pressors. Tunneled dialysis catheter functioning without issues.    Objective     Physical Exam  Constitutional:       Appearance: She is ill-appearing.   HENT:      Head: Normocephalic and atraumatic.   Cardiovascular:      Rate and Rhythm: Normal rate.   Pulmonary:      Effort: Pulmonary effort is normal.   Abdominal:      General: Abdomen is flat. Bowel sounds are normal.   Skin:     General: Skin is warm and dry.      Findings: Rash present.      Comments: Heel wounds with eschar. Sacral rash red and present to b/l buttocks and sacrum.    Neurological:      Mental Status: She is alert. She is disoriented.      Motor: Weakness present.         Last Recorded Vitals  Blood pressure (!) 63/23, pulse 101, temperature 36.1 °C (97 °F), temperature source Temporal, resp. rate 15, height 1.575 m (5' 2\"), weight 100 kg (221 lb 5.5 oz), SpO2 91 %.  Intake/Output last 3 Shifts:  I/O last 3 completed shifts:  In: 471.2 (5.1 mL/kg) [P.O.:240; I.V.:231.2 (2.5 mL/kg)]  Out: 1475 (15.9 mL/kg) [Other:1000; Stool:475]  Dosing Weight: 93 kg     Relevant Results  Lab Results   Component Value Date    WBC 15.6 (H) 01/30/2024    HGB 9.9 (L) 01/30/2024    HCT 30.6 (L) 01/30/2024    MCV 91 01/30/2024     01/30/2024     Lab Results   Component Value Date    GLUCOSE 210 (H) 01/30/2024    CALCIUM 8.3 (L) 01/30/2024     01/30/2024    K 4.2 01/30/2024    CO2 24 01/30/2024    CL 98 01/30/2024    BUN 7 (L) 01/30/2024    CREATININE 2.20 (H) 01/30/2024       Assessment/Plan   Principal Problem:    Septic shock (CMS/HCC)  Active Problems:    Pneumonia    Low blood pressure    End stage renal disease (CMS/HCC)    Anemia due to blood loss, acute  1/30: Continue wound care with current recommendations. Will follow intermittently.     1/29: Sacral rash improving with nystatin and zinc oxide. Specialty mattress, " supplements. Daily wound care. No new recommendations.     1/26/24: continue with daily wound care. Podiatry following for heel wounds. Wait to see if permcath will be needed. Supplements, specialty mattress, daily wound care, Christian.     1/25: Continue with Nystatin and Zinc paste to sacrum. Per podiatry note, her heel wounds are stable and should continue with betadine and dry dressings. Should the wound become unstable, she may be able to have debridement. Will continue to follow for wound care.     1/24: Continue with nystatin and zinc paste to sacral area. Vascular saw patient and she is not a candidate for surgical intervention to heel wounds due to high dose pressor requirement. I do not see any recs from podiatry; however, per wound care RN note, there was a possibility of debridement today. I have reached out to podiatry. For now, continue to paint with betadine and cover with kerlex.    1/23: Moisture associated sacral rash washed with soap and water and a combination of triad and nystatin powder applied. Podiatry is currently present at the bedside, awaiting recs for heel wounds. Will follow for wound care.     1/22:Heel wounds to be evaluated by podiatry for possible debridement. For sacral dermatitis, clean with soap and water, dry completely and apply mixture of zinc oxide and nystatin.        I spent 15 minutes in the professional and overall care of this patient.      Nickolas Gallego, APRN-CNP

## 2024-01-30 NOTE — PROGRESS NOTES
Ayanna Gross is a 73 y.o. female on day 11 of admission presenting with Septic shock (CMS/HCC).    Subjective   Interval History:   Afebrile, no chills   present  No diarrhea  No chest pain  Remains on pressors-interval increase in requirement      Review of Systems   All other systems reviewed and are negative.      Objective   Range of Vitals (last 24 hours)  Heart Rate:  []   Temp:  [35.9 °C (96.6 °F)-36.7 °C (98.1 °F)]   Resp:  [12-32]   BP: ()/(30-55)   Weight:  [100 kg (221 lb 5.5 oz)]   SpO2:  [72 %-100 %]   Daily Weight  01/30/24 : 100 kg (221 lb 5.5 oz)    Body mass index is 40.48 kg/m².    Physical Exam  Constitutional:       Appearance: Normal appearance.   HENT:      Head: Normocephalic and atraumatic.      Nose: Nose normal.   Eyes:      Extraocular Movements: Extraocular movements intact.      Conjunctiva/sclera: Conjunctivae normal.   Cardiovascular:      Heart sounds: Normal heart sounds, S1 normal and S2 normal.   Pulmonary:      Breath sounds: Decreased breath sounds present.   Abdominal:      General: Bowel sounds are normal.      Palpations: Abdomen is soft.   Musculoskeletal:      Cervical back: Normal range of motion and neck supple.   Skin:     Comments: Stage III sacral ulcer, bilateral posterior heel eschar -photos examined  Neurological:      Mental Status: She is alert.     Antibiotics  aspirin tablet 325 mg  acetaminophen (Tylenol) tablet 650 mg  cefepime (Maxipime) 1 g in dextrose 5 % 50 mL IV  sodium chloride 0.9 % bolus 2,229 mL  apixaban (Eliquis) tablet 2.5 mg  escitalopram (Lexapro) tablet 10 mg  febuxostat (Uloric) tablet 40 mg  fenofibrate (Triglide) tablet 160 mg  gabapentin (Neurontin) capsule 100 mg  midodrine (Proamatine) tablet 15 mg  nystatin (Mycostatin) 100,000 unit/gram powder  oxyCODONE-acetaminophen (Percocet) 5-325 mg per tablet 1 tablet  simvastatin (Zocor) tablet 20 mg  piperacillin-tazobactam-dextrose (Zosyn) IV 2.25 g  vancomycin (Vancocin)  capsule 125 mg  oxygen (O2) therapy  dextrose 50 % injection 25 g  glucagon (Glucagen) injection 1 mg  dextrose 10 % in water (D10W) infusion  insulin lispro (HumaLOG) injection 0-10 Units  nystatin (Mycostatin) 100,000 unit/gram powder 1 Application  vancomycin-diluent combo no.1 (Xellia) IVPB 1,750 mg  piperacillin-tazobactam-dextrose (Zosyn) IV 2.25 g  sodium chloride 0.9 % bolus 500 mL  norepinephrine (Levophed) 8 mg in dextrose 5% 250 mL (0.032 mg/mL) infusion (premix)  vancomycin (Vancocin) placeholder  pantoprazole (ProtoNix) EC tablet 40 mg  pantoprazole (ProtoNix) injection 40 mg  sennosides-docusate sodium (Judy-Colace) 8.6-50 mg per tablet 1 tablet  doxycycline (Vibramycin) in dextrose 5 % in water (D5W) 100 mL  mg  LORazepam (Ativan) injection 2 mg  magnesium sulfate IV 2 g  zinc oxide 20 % ointment 1 Application  nystatin (Mycostatin) cream  norepinephrine (Levophed) 8 mg in dextrose 5% 250 mL (0.032 mg/mL) infusion (premix)  heparin 1,000 unit/mL injection 2,000 Units  heparin 1,000 unit/mL injection 2,000 Units  nystatin (Mycostatin) ointment  acetaminophen (Tylenol) oral liquid 1,000 mg  albumin human 25 % solution 12.5 g  perflutren lipid microspheres (Definity) injection 0.5-10 mL of dilution  sulfur hexafluoride microsphr (Lumason) injection 24.28 mg  perflutren protein A microsphere (Optison) injection 0.5 mL  ipratropium-albuteroL (Duo-Neb) 0.5-2.5 mg/3 mL nebulizer solution 3 mL  sodium chloride 3 % nebulizer solution 3 mL  vancomycin-diluent combo no.1 (Xellia) IVPB 750 mg  sennosides-docusate sodium (Judy-Colace) 8.6-50 mg per tablet 1 tablet  acetaminophen (Tylenol) tablet 650 mg  doxycycline (Vibramycin) capsule 100 mg  magnesium oxide (Mag-Ox) tablet 400 mg  vasopressin (Vasostrict) 0.2 unit/mL infusion  norepinephrine (Levophed) 8 mg in dextrose 5% 250 mL (0.032 mg/mL) infusion (premix)  heparin 1,000 unit/mL injection 2,000 Units  heparin 1,000 unit/mL injection 2,000  Units  albumin human 25 % solution 12.5 g  vancomycin (Xellia) 1 g in 200 mL (Xellia) IVPB 1 g  ipratropium-albuteroL (Duo-Neb) 0.5-2.5 mg/3 mL nebulizer solution 3 mL  sodium chloride 3 % nebulizer solution 3 mL  albumin human 5 % infusion 12.5 g  heparin 1,000 unit/mL injection 2,000 Units  heparin 1,000 unit/mL injection 2,000 Units  vancomycin (Vancocin) capsule 125 mg  albumin human 25 % solution 12.5 g  heparin 1,000 unit/mL injection 2,000 Units  heparin 1,000 unit/mL injection 2,000 Units  ipratropium-albuteroL (Duo-Neb) 0.5-2.5 mg/3 mL nebulizer solution 3 mL  lidocaine (Xylocaine) 10 mg/mL (1 %) injection 50 mg  sodium phosphate 15 mmol in sodium chloride 0.9% 250 mL IV  sod phos di, mono-K phos mono (K Phos Neutral) tablet 250 mg  potassium, sodium phosphates (Phos-NaK) 280-160-250 mg packet 1 packet      Relevant Results  Labs  Results from last 72 hours   Lab Units 01/30/24  0508 01/29/24  0331 01/28/24  0322   WBC AUTO x10*3/uL 15.6* 16.2* 14.6*   HEMOGLOBIN g/dL 9.9* 9.4* 9.4*   HEMATOCRIT % 30.6* 29.7* 29.2*   PLATELETS AUTO x10*3/uL 169 141* 139*     Results from last 72 hours   Lab Units 01/30/24  0508 01/29/24  0331 01/28/24  0322   SODIUM mmol/L 133 131* 134   POTASSIUM mmol/L 4.2 3.7 4.0   CHLORIDE mmol/L 98 97 99   CO2 mmol/L 24 19* 22*   BUN mg/dL 7* 12 9   CREATININE mg/dL 2.20* 3.20* 2.50*   GLUCOSE mg/dL 210* 150* 100*   CALCIUM mg/dL 8.3* 8.6 8.4*   ANION GAP mmol/L 11 15 13   EGFR mL/min/1.73m*2 23* 15* 20*   PHOSPHORUS mg/dL 2.0* 3.1 2.4*     Results from last 72 hours   Lab Units 01/30/24  0508 01/29/24  0331 01/28/24  0322   ALK PHOS U/L 255* 183* 193*   BILIRUBIN TOTAL mg/dL 1.4* 1.7* 1.6*   PROTEIN TOTAL g/dL 5.1* 4.8* 4.9*   ALT U/L 15 14 15   AST U/L 35 34 50*   ALBUMIN g/dL 2.6* 2.6* 2.7*     Estimated Creatinine Clearance: 26 mL/min (A) (by C-G formula based on SCr of 2.2 mg/dL (H)).  C-Reactive Protein   Date Value Ref Range Status   12/25/2023 5.20 (H) 0.00 - 2.00 mg/dL Final      CRP   Date Value Ref Range Status   06/23/2023 19.9 (H) 0 - 2.0 MG/DL Final     Comment:     Performed at [place] [address]   05/22/2022 1.0 0 - 2.0 MG/DL Final     Comment:     Performed at [place] [address]     Microbiology  Susceptibility data from last 14 days.  Collected Specimen Info Organism Ampicillin Cefazolin Cefazolin (uncomplicated UTIs only) Ciprofloxacin Gentamicin Piperacillin/Tazobactam Tetracycline Trimethoprim/Sulfamethoxazole   01/19/24 Urine from Indwelling (Amos) Catheter Proteus mirabilis S S S S S S R S   Reviewed  Imaging  Upper extremity venous duplex bilateral    Result Date: 1/25/2024           [place] [address]            Phone [phone]  Vascular Lab Report  [facility] UPPER EXTREMITY VENOUS DUPLEX BILATERAL Patient Name:      BASIA Gray Physician:  [id number] Mini Busby MD, RPVI Study Date:        1/25/2024           Ordering Provider:  [id number] ERI BERMAN MRN/PID:           [id number]            Fellow: Accession#:        [id number]        Technologist:       Dara Parr RVRICHIE Date of Birth/Age: 1950 / 73 years Technologist 2: Gender:            F                   Encounter#:         [id number] Admission Status:  Inpatient           Location Performed: [facility]  Diagnosis/ICD: Right arm swelling-M79.89; Left arm swelling-M79.89 CPT Codes:     92610 Peripheral venous duplex scan for DVT complete  CONCLUSIONS:  Right Upper Venous: No evidence of acute deep vein thrombus visualized in the right upper extremity. Internal jugular vein was visualized in segments due to IV lines and bandages. Left Upper Venous: No evidence of acute deep vein thrombus visualized in the left upper extremity. Subclavian stent is noted and appears patent. There is a known occluded dialysis access noted.   Additional Findings: Technically difficult exam due to IV lines, bandages, and patient's positioning.  Imaging & Doppler Findings:  Right               Compressible Thrombus        Flow Internal Jugular        Yes        None   Spontaneous/Phasic Subclavian              Yes        None Subclavian Proximal     Yes        None   Spontaneous/Phasic Subclavian Mid          Yes        None Subclavian Distal       Yes        None   Spontaneous/Phasic Axillary                Yes        None       Pulsatile Brachial                Yes        None Cephalic                Yes        None Basilic                 Yes        None  Left                Compress Thrombus        Flow Internal Jugular      Yes      None       Pulsatile Subclavian            Yes      None Subclavian Proximal   Yes      None       Pulsatile Subclavian Mid        Yes      None       Pulsatile Subclavian Distal     Yes      None       Pulsatile Axillary              Yes      None   Spontaneous/Phasic Brachial              Yes      None Cephalic              Yes      None Basilic               Yes      None  [id number] Mini Busby MD, RPVI Electronically signed by [id number] Mini Busby MD, RPVI on 1/25/2024 at 4:06:13 PM  ** Final **     ECG 12 lead    Result Date: 1/24/2024  Sinus tachycardia  with 1st degree AV block Right bundle branch block Possible Lateral infarct , age undetermined Abnormal ECG Confirmed by Yesika Nj ([id number]) on 1/24/2024 6:54:45 AM    XR tibia fibula right 2 views    Result Date: 1/22/2024  Interpreted By:  Real Mendieta, STUDY: XR TIBIA FIBULA RIGHT 2 VIEWS; 1/22/2024 2:15 pm   INDICATION: Signs/Symptoms:evaluate source of infection, osteomyelitis;   COMPARISON: None available.   ACCESSION NUMBER(S): [id number]   ORDERING CLINICIAN: BAN GIFFORD   TECHNIQUE: Views: AP and Lateral of the right tibia and fibula   FINDINGS: RESULT: There is no evidence for fracture or dislocation. Tricompartmental degenerative changes of the  right knee. The tibia and fibula appear intact without bony erosion or evidence for osteomyelitis. No other bony or soft tissue abnormality is identified. No subcutaneous gas is noted.       No evidence for acute osseous abnormality. No bony erosion to suggest osteomyelitis.   Signed by: Real Mendieta 1/22/2024 3:15 PM Dictation workstation:   [id number]    XR ankle right 3+ views    Result Date: 1/22/2024  Interpreted By:  Real Mendieta, STUDY: XR ANKLE RIGHT 3+ VIEWS; 1/22/2024 2:15 pm   INDICATION: Signs/Symptoms:Evuluate source of infection, osteomyelitis;   COMPARISON: None available.   ACCESSION NUMBER(S): [id number]   ORDERING CLINICIAN: BAN GIFFORD   TECHNIQUE: Views: AP, Lateral, Oblique, right ankle   FINDINGS: RESULT: There is no evidence for fracture or dislocation. The ankle mortise is intact. Joint spaces appear adequately maintained. No bony erosion to suggest osteomyelitis. No bone lesion or soft tissue abnormality is identified. No subcutaneous gas is seen.       No evidence for acute osseous abnormality. No bony erosion to suggest osteomyelitis.   Signed by: Real Mendieta 1/22/2024 3:14 PM Dictation workstation:   [id number]    Transthoracic Echo (TTE) Complete    Result Date: 1/22/2024           [place] [address]            Phone [phone] TRANSTHORACIC ECHOCARDIOGRAM REPORT  Patient Name:      BASIA FREIDA Gray Physician:   [id number] [facility] Study Date:        1/22/2024           Ordering Provider:   [id number] ERI BERMAN MRN/PID:           [id number]            Fellow: Accession#:        [id number]        Nurse: Date of Birth/Age: 1950 / 73 years Sonographer:         Tabatha Page RDCS Gender:            F                   Additional Staff: Height:            157.00 cm           Admit Date: Weight:            75.00 kg            Admission Status:     Inpatient - Routine BSA:               1.76 m2             Department Location: [facility] ICU Blood Pressure: 88 /49 mmHg Study Type:    TRANSTHORACIC ECHO (TTE) COMPLETE Diagnosis/ICD: Chronic combined systolic (congestive) and diastolic (congestive)                heart failure (CHF)-I50.42; Sepsis, unspecified organism-A41.9 Indication:    heart failure,septic shock CPT Codes:     Echo Complete w Full Doppler-21302 Patient History: BMI:               Obese >30 Pertinent History: Sepsis, PVD, A-Fib, CVA, Cancer and HTN. breast                    prosthesis/ca, ESRD,anemia,septic shock,heart failure. Study Detail: The following Echo studies were performed: 2D, M-Mode, Doppler and               color flow. Technically challenging study due to prosthesis,               prominent lung artifact and body habitus.  PHYSICIAN INTERPRETATION: Left Ventricle: Left ventricular systolic function is normal, with an estimated ejection fraction of 70%. There are no regional wall motion abnormalities. The left ventricular cavity size is normal. Left ventricular diastolic filling was indeterminate. Left Atrium: The left atrium is moderately dilated. Right Ventricle: The right ventricle is normal in size. There is normal right ventricular global systolic function. Right Atrium: The right atrium is normal in size. Aortic Valve: The aortic valve is trileaflet. There is no evidence of aortic valve regurgitation. The peak instantaneous gradient of the aortic valve is 2.8 mmHg. Mitral Valve: The mitral valve is normal in structure. There is no evidence of mitral valve regurgitation. Tricuspid Valve: The tricuspid valve is structurally normal. There is trace tricuspid regurgitation. Pulmonic Valve: The pulmonic valve is not well visualized. The pulmonic valve regurgitation was not well visualized. Pericardium: There is no pericardial effusion noted. Aorta: The aortic root is normal.  CONCLUSIONS:  1. Left ventricular systolic function is  normal with a 70% estimated ejection fraction.  2. The left atrium is moderately dilated.  3. Left ventricular diastolic filling indeterminate. QUANTITATIVE DATA SUMMARY: 2D MEASUREMENTS:                          Normal Ranges: LAs:           4.50 cm   (2.7-4.0cm) IVSd:          0.61 cm   (0.6-1.1cm) LVPWd:         0.88 cm   (0.6-1.1cm) LVIDd:         3.66 cm   (3.9-5.9cm) LV Mass Index: 41.9 g/m2 LV SYSTOLIC FUNCTION BY 2D PLANIMETRY (MOD):                     Normal Ranges: EF-A4C View: 68.3 % (>=55%) LV DIASTOLIC FUNCTION:                     Normal Ranges: MV Peak E: 1.19 m/s (0.7-1.2 m/s) MV Peak A: 0.90 m/s (0.42-0.7 m/s) E/A Ratio: 1.32     (1.0-2.2) MITRAL VALVE:                 Normal Ranges: MV DT: 192 msec (150-240msec) AORTIC VALVE:                         Normal Ranges: AoV Vmax:      0.84 m/s (<=1.7m/s) AoV Peak P.8 mmHg (<20mmHg) LVOT Max Shimon:  0.47 m/s (<=1.1m/s) LVOT Diameter: 1.90 cm  (1.8-2.4cm) AoV Area,Vmax: 1.57 cm2 (2.5-4.5cm2) TRICUSPID VALVE/RVSP:                   Normal Ranges: IVC Diam: 1.05 cm  [id number] [facility] Electronically signed on 2024 at 2:53:04 PM  ** Final **     XR foot left 3+ views    Result Date: 2024  Interpreted By:  Real Mendieta, STUDY: XR FOOT LEFT 1-2 VIEWS; 2024 4:15 pm   INDICATION: Signs/Symptoms:osteomyelitis. Pain   COMPARISON: 2023   ACCESSION NUMBER(S): [id number]   ORDERING CLINICIAN: BAN GIFFORD   TECHNIQUE: Views:  AP, Lat, Oblique, left foot   FINDINGS: RESULT: There is no evidence for fracture or dislocation. 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 Mild hammertoe deformities. Joint spaces appear adequately maintained. Soft tissue ulceration of the posterior aspect of the heel however no evidence for bony erosion to suggest osteomyelitis.       No evidence for acute fracture or acute bony erosion to suggest osteomyelitis. Chronic changes as described.   Signed by: Real Mendieta 2024 7:15  PM Dictation workstation:   [id number]    CT chest abdomen pelvis wo IV contrast    Result Date: 1/21/2024  Interpreted By:  Maria Garcia, STUDY: CT CHEST ABDOMEN PELVIS WO CONTRAST;  1/20/2024 11:42 pm   INDICATION: Mental status change. Elevated white blood cell count with infectious workup.   COMPARISON: 08/20/2023   ACCESSION NUMBER(S): [id number]   ORDERING CLINICIAN: BAN GIFFORD   TECHNIQUE: CT of the chest, abdomen and pelvis was performed with no oral or intravenous contrast administered. Sagittal and coronal reformations were completed by the technologist at the acquisition scanner.   All CT examinations are performed with 1 or more of the following dose reduction techniques: Automated exposure control, adjustment of mA and/or kv according to patient's size, or use of iterative reconstruction techniques.   FINDINGS: Please note that the study is limited without intravenous contrast.   CHEST: Bilateral pleural effusions are present with right effusion moderately small in size and with the left effusion moderately small in size as well. There is shift of the heart and mediastinum to the left of midline due to atelectasis of the left upper lobe. There is consolidation throughout left lower lobe with air bronchograms noted. On the right, there is compressive atelectasis seen posteriorly in the right lower lobe.   There is mild reactive mediastinal adenopathy seen at this time with mediastinal lymph nodes increased in size since prior study. Several of these mediastinal nodes are at the upper limits of normal measuring 8 mm in short axis diameter.   No pericardial effusion is present. The cardiac size is normal with mitral annulus calcification.   There has been prior bilateral mastectomy with reconstruction of the left breast with implant placement. Surgical clips are visible within the left axilla. Stent grafts are visible within the left upper extremity and within the left innominate, subclavian as well as  axillary veins.   A peripherally calcified 1.8 cm nodule arises from the lower pole of left thyroid lobe.   ABDOMEN:   LIVER: Unremarkable.   BILE DUCTS: No intrahepatic or extrahepatic biliary ductal dilatation is seen.   GALLBLADDER: Cholelithiasis is observed with some calcification of the gallbladder wall demonstrated as well. No gallbladder wall thickening or pericholecystic fluid is evident.   PANCREAS: Unremarkable   SPLEEN: Unremarkable   ADRENAL GLANDS: Unremarkable   KIDNEYS AND URETERS: Kidneys are small in size with extensive renal artery calcification demonstrated.   PELVIS:   BLADDER: Amos catheter is present within the decompressed urinary bladder.   REPRODUCTIVE ORGANS: Calcification of the arcuate arteries within the uterus is seen. There is no uterine enlargement or adnexal mass.   BOWEL: A rectal balloon is seen. There is no abnormal distention of the intestinal tract.   VESSELS: There is atherosclerosis of the thoracoabdominal aorta and iliac arteries with calcification in the walls of the celiac, splenic, hepatic, and mesenteric arteries.   PERITONEUM/RETROPERITONEUM/LYMPH NODES: There is a minimal amount of ascites seen about the liver and spleen with mild presacral edema noted.   ABDOMINAL WALL: There is anasarca involving the soft tissues of the abdomen and pelvis. Postoperative change from ventral hernia repair is seen.   BONES: Bilateral sacroiliitis is seen. There is lumbar dextroscoliosis. Chronic rotator cuff tear is present bilaterally with osteoarthritis of both shoulders, right greater than left.       Chest 1.  Moderately small bilateral pleural effusions with left upper lobe atelectasis and compressive atelectasis seen posteriorly in right lower lobe. A left lower lobe pneumonia is identified. There is extensive airspace consolidation within left lower lobe with air bronchograms observed. Mild mediastinal reactive adenopathy is present as well.   Abdomen-Pelvis 1.  Cholelithiasis  without evidence of cholecystitis. 2. Small amount of ascites with mild presacral edema and anasarca within the soft tissues of the abdominal wall. 3. Renal atrophy with extensive vascular calcifications.     MACRO: None   Signed by: Maria Garcia 1/21/2024 8:30 AM Dictation workstation:   [id number]    CT head wo IV contrast    Result Date: 1/21/2024  Interpreted By:  Maria Garcia, STUDY: CT HEAD WO IV CONTRAST 1/20/2024 11:42 pm   INDICATION: Signs/Symptoms:mental status changes   COMPARISON: 12/11/2023   ACCESSION NUMBER(S): [id number]   ORDERING CLINICIAN: BAN GIFFORD   TECHNIQUE: Unenhanced axial images of the brain are completed.   All CT examinations are performed with 1 or more of the following dose reduction techniques: Automated exposure control, adjustment of mA and/or kv according to patient's size, or use of iterative reconstruction techniques.   FINDINGS: Helical unenhanced axial images of the brain demonstrate a mild to moderate degree of ventricular enlargement with proportionate widening of the sulci and sylvian fissures. There is no midline shift, mass effect, extra-axial fluid collection, or acute intracranial hemorrhage. There is diminished density seen in the periventricular white matter indicating chronic microvascular ischemic disease. There is calcified plaque seen within the distal vertebral and internal carotid arteries bilaterally. No calvarial abnormality is seen.       Atrophy and chronic microvascular ischemic disease without acute intracranial process.   Signed by: Maria Garcia 1/21/2024 8:09 AM Dictation workstation:   [id number]    XR chest 1 view    Result Date: 1/20/2024  Interpreted By:  Brendon Perez, STUDY: XR CHEST 1 VIEW; 1/20/2024 5:03 pm   INDICATION: CLINICAL INFORMATION: Signs/Symptoms:Insertion right IJ central line, also left thoracentesis.   COMPARISON: 01/19/2024 at 1338 hours   ACCESSION NUMBER(S): [id number]   ORDERING CLINICIAN: BOZENA ANTONIO   TECHNIQUE: Portable  chest one view.   FINDINGS: The cardiac size is indeterminate in view of the AP projection. There is no change in the right-sided dual port central venous catheter. There is a new right internal jugular venous catheter present with the tip at approximately same level as the dual port central venous catheter, overlying the mid to lower right atrium. There is no evidence for pneumothorax. Patient has a history of left thoracentesis without evidence for pneumothorax on the left. There is bilateral basilar alveolar infiltrate and effusions. Left effusion is decreased compared to the study from 1 day earlier.   Surgical clips are identified within left chest wall. Endovascular stent is identified in the distribution of the left subclavian artery.       1. Status post right-sided central venous catheter placement as described above with the tip overlying the mid to caudal aspect of the right atrium. There is no evidence for pneumothorax. 2. No evidence for left-sided pneumothorax after left-sided thoracentesis. Decrease in the left effusion. 3. Bilateral basilar infiltrates and effusions are present. Follow-up to assure complete clearing is suggested.   MACRO: none   Signed by: Brendon Perez 1/20/2024 5:11 PM Dictation workstation:   [id number]    XR chest 1 view    Result Date: 1/19/2024  Interpreted By:  Real Mendieta, STUDY: XR CHEST 1 VIEW; 1/19/2024 1:38 pm   INDICATION: Signs/Symptoms:dyspnea.   COMPARISON: 12/26/2023   ACCESSION NUMBER(S): [id number]   ORDERING CLINICIAN: EMELY GOLDSMITH   TECHNIQUE: 1 view of the chest was performed.   FINDINGS: There may be a minimal right pleural effusion. Slight prominence of the interstitium otherwise the right lung is clear. Improved appearance of the left lung with improved aeration of the right upper lobe. There is opacification of the left lower lobe possibly due to large pleural effusion which is similar to the prior study. No pneumothorax. Right-sided dual lumen  central line catheter with tip at the proximal atrium. The cardiomediastinal silhouette is within normal limits. Left-sided subclavian stents are again noted.       Improved aeration and appearance of the left lung superiorly however there is a persistent large left pleural effusion and opacification of the left lower lobe.   Signed by: Real Mendieta 1/19/2024 1:44 PM Dictation workstation:   [id number]    Electrocardiogram, 12-lead PRN ACS symptoms    Result Date: 1/7/2024  Sinus rhythm Right bundle branch block Septal infarct (cited on or before 06-DEC-2023) Abnormal ECG When compared with ECG of 06-DEC-2023 13:06, Questionable change in initial forces of Septal leads Confirmed by Herminio Lemus ([id number]) on 1/7/2024 11:04:01 AM     Assessment/Plan   Septic shock-on pressors-resolving  Left-sided pleural effusion   Left lower lobe pneumonia-treated  Proteus urinary tract infection-treated  History of MRSA endocarditis  History of C. 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

## 2024-01-30 NOTE — PROGRESS NOTES
Ayanna Gross is a 73 y.o. female on day 11 of admission presenting with Septic shock (CMS/HCC).    Subjective   Symptoms (0 - 10, Best to Worst)  Wells Symptom Assessment System  Pain Score: 0 - No pain     By the attending service to reconsult.  Since last visit patient's septic shock has stabilized, has successfully transitioned off of IV antibiotics and is now on oral doxycycline for suppressive therapy for her history of MRSA bacteremia and endocarditis.  However she has not been able to wean off of the Levophed at this point in time, her systolic is currently 70/52 on Levophed.  Per staff RN patient is now asking about discharging home, however we are looking at discharging to LTAC due to the Levophed requirements.    Objective     Physical Exam  Vitals and nursing note reviewed.   Constitutional:       General: She is not in acute distress.     Appearance: She is ill-appearing.   HENT:      Head: Normocephalic and atraumatic.      Nose: Nose normal.      Mouth/Throat:      Mouth: Mucous membranes are moist.      Pharynx: Oropharynx is clear.   Eyes:      Extraocular Movements: Extraocular movements intact.      Pupils: Pupils are equal, round, and reactive to light.   Cardiovascular:      Rate and Rhythm: Normal rate and regular rhythm.      Pulses: Normal pulses.      Heart sounds: No murmur heard.  Pulmonary:      Effort: Pulmonary effort is normal. No respiratory distress.      Breath sounds: Normal breath sounds.   Abdominal:      General: Abdomen is flat. Bowel sounds are normal. There is no distension.      Palpations: Abdomen is soft.      Tenderness: There is no abdominal tenderness.   Musculoskeletal:         General: No swelling, tenderness, deformity or signs of injury. Normal range of motion.      Cervical back: Normal range of motion and neck supple. No rigidity.   Skin:     General: Skin is warm and dry.      Capillary Refill: Capillary refill takes 2 to 3 seconds.      Comments: Back and  "buttocks are not assessed  Vascular ulcerations noted to the left anterior shin and bilateral heels with stable black eschar.   Neurological:      General: No focal deficit present.      Mental Status: She is alert and oriented to person, place, and time.      Motor: Weakness present.   Psychiatric:         Mood and Affect: Mood normal.         Last Recorded Vitals  Blood pressure (!) 63/23, pulse 104, temperature 36.1 °C (97 °F), temperature source Temporal, resp. rate 19, height 1.575 m (5' 2\"), weight 100 kg (221 lb 5.5 oz), SpO2 90 %.  Intake/Output last 3 Shifts:  I/O last 3 completed shifts:  In: 471.2 (5.1 mL/kg) [P.O.:240; I.V.:231.2 (2.5 mL/kg)]  Out: 1475 (15.9 mL/kg) [Other:1000; Stool:475]  Dosing Weight: 93 kg     Relevant Results  Results for orders placed or performed during the hospital encounter of 01/19/24 (from the past 24 hour(s))   POCT GLUCOSE   Result Value Ref Range    POCT Glucose 126 (H) 74 - 99 mg/dL   POCT GLUCOSE   Result Value Ref Range    POCT Glucose 141 (H) 74 - 99 mg/dL   CBC   Result Value Ref Range    WBC 15.6 (H) 4.4 - 11.3 x10*3/uL    nRBC 0.0 0.0 - 0.0 /100 WBCs    RBC 3.35 (L) 4.00 - 5.20 x10*6/uL    Hemoglobin 9.9 (L) 12.0 - 16.0 g/dL    Hematocrit 30.6 (L) 36.0 - 46.0 %    MCV 91 80 - 100 fL    MCH 29.6 26.0 - 34.0 pg    MCHC 32.4 32.0 - 36.0 g/dL    RDW 25.3 (H) 11.5 - 14.5 %    Platelets 169 150 - 450 x10*3/uL   Comprehensive metabolic panel   Result Value Ref Range    Glucose 210 (H) 65 - 99 mg/dL    Sodium 133 133 - 145 mmol/L    Potassium 4.2 3.4 - 5.1 mmol/L    Chloride 98 97 - 107 mmol/L    Bicarbonate 24 24 - 31 mmol/L    Urea Nitrogen 7 (L) 8 - 25 mg/dL    Creatinine 2.20 (H) 0.40 - 1.60 mg/dL    eGFR 23 (L) >60 mL/min/1.73m*2    Calcium 8.3 (L) 8.5 - 10.4 mg/dL    Albumin 2.6 (L) 3.5 - 5.0 g/dL    Alkaline Phosphatase 255 (H) 35 - 125 U/L    Total Protein 5.1 (L) 5.9 - 7.9 g/dL    AST 35 5 - 40 U/L    Bilirubin, Total 1.4 (H) 0.1 - 1.2 mg/dL    ALT 15 5 - 40 U/L    " Anion Gap 11 <=19 mmol/L   Magnesium   Result Value Ref Range    Magnesium 1.70 1.60 - 3.10 mg/dL   Phosphorus   Result Value Ref Range    Phosphorus 2.0 (L) 2.5 - 4.5 mg/dL   POCT GLUCOSE   Result Value Ref Range    POCT Glucose 166 (H) 74 - 99 mg/dL   POCT GLUCOSE   Result Value Ref Range    POCT Glucose 167 (H) 74 - 99 mg/dL    No results found.     Assessment/Plan   IMP:    1.Acute Metabolic Encephalopathy, resolving    2.Recurrent Pleural Effusions, CHF   3. Septic Shock  4. Vasculopathy   5. Hx of Atrial fibrillation   6. Hx of endocarditis    7.  Malnutrition  8. ESRD   9. Metabolic lactic acidosis, resolved   10. T2DM  11. PAD with vascular/pressure wounds  12. Hx of recurrent C. Diff infection  13. Hx of endocarditis     Full code full  Capable  Documented healthcare power of  is the daughter Meka, copy of POA in the chart.    Chart reviewed, discussed with attending service, this is a patient had initially presented with septic shock respiratory failure requiring pressor support and multiple thoracenteses, has slowly been able to titrate off of her higher oxygen requirements is currently on 2 L nasal cannula.  She is now off of IV antibiotics with the exception of doxycycline which she is on for prophylaxis for her history of MRSA endocarditis/bacteremia and oral vancomycin for her history of C. difficile.  However she remains on Levophed with a systolic in the 70s.  Patient is unable to further titrate on the Levophed and we are faced with a decision regarding proceeding forward with transition to LTAC however patient is making statements about wishing to go home.  Per attending service, they had an extensive 2-hour discussion with the patient herself today as she was capable, please see note from attending service.  Patient stating that she wished to go home, considering hospice for supportive care very aware that her status is quite fragile and that she is progressively worsened to a point  where we cannot sustain her outside of a hospital type setting.  Previous goals of care discussion had established a full code with an aggressive disease modifying model of care, and daughters were supportive of this.  Yet this point there seems to be conflict between what the patient is currently stating and the previously established goals, I discussed this with the attending service and we both agree that a family meeting might be in order in order to firmly establish goals moving forward and establish a discharge plan, as there appears to be nothing that we are acutely treating.  I reached out to the daughter Libby was unable to leave a voicemail.  I reached out to the daughter Shannon, we discussed briefly need to set up family meeting to discuss goals of care and discharge planning, she is off work and will be available tomorrow at 430, she later called me back and confirmed that her sister was also available at this time.    Will await family meeting tomorrow at 430.    I spent 60 minutes in the professional and overall care of this patient.      Jazmin Sanchez, APRN-CNP

## 2024-01-31 NOTE — PROGRESS NOTES
CONSULT PROGRESS NOTES    SERVICE DATE: 1/31/2024   SERVICE TIME: 12:28 PM    CONSULTING SERVICE: Nephrology    ASSESSMENT AND PLAN   1.  End-stage renal disease  2.  Hypotension  3.  Hyponatremia  4.  Hypokalemia  5.  Anemia of chronic kidney disease     Dialysis today with [facility] again with low temperature and attempting gentle fluid removal.    She is extremely malnourished with low albumin, low prealbumin, no major muscle mass.  She has been hospitalized for the majority of the past 2 months.  Hyponatremia from volume overload in this patient with oliguria.  For her hypokalemia, we are using a 4K bath.  Hemoglobin is close to goal.  Holding albumin.  She did not really seem to respond to the fludrocortisone, seems dependent on norepinephrine, even at a lower dose.  I had a long discussion with her about disposition today.  None of her options are tremendous.  Certainly we could withdraw care and make her hospice, I am not certain she would be able to make it home, or if she did go home, it would not be for very long.  I think her heart would ultimately stop with how low her blood pressure is and without pressor support.  We discussed going to a nursing facility, which again is not feasible given that she would come right back with hypotension.  We further discussed LTAC consideration.  Ultimately her main goal is to spend as much possible time as she can with her grandchildren.  Because she is on infectious isolation here, this is not feasible.  We discussed even going to a hospice facility.  We discussed the possibility of using pressors with hospice.  I think she should meet with hospice to further discuss these options.  Family meeting is planned for this afternoon after hemodialysis, which is a good idea.  Unfortunately, medically, there is not much more to offer her.  We have exhausted all options with her blood pressure and she seems dependent on norepinephrine to sustain her blood pressure that we will  keep her alive.  Case discussed with Dr. Barreto.  I do not think she benefits from CRRT.    SUBJECTIVE  INTERVAL HPI: The patient feels okay.  She was seen eating Mr. De León with her daughter at bedside who is helping her eat.  Her blood pressure remains low and she is on increased dose of norepinephrine.  She began fludrocortisone yesterday.    MEDICATIONS:  acetaminophen, 650 mg, oral, TID  apixaban, 2.5 mg, oral, BID  doxycylcine, 100 mg, oral, Daily  escitalopram, 10 mg, oral, Nightly  fludrocortisone, 0.1 mg, oral, TID  gabapentin, 100 mg, oral, BID  heparin, 2,000 Units, intra-catheter, After Dialysis  heparin, 2,000 Units, intra-catheter, After Dialysis  insulin lispro, 0-10 Units, subcutaneous, TID with meals  ipratropium-albuteroL, 3 mL, nebulization, q6h while awake  midodrine, 15 mg, oral, TID with meals  nystatin, , Topical, BID  nystatin, , Topical, BID  pantoprazole, 40 mg, oral, Daily   Or  pantoprazole, 40 mg, intravenous, Daily  sodium chloride, 3 mL, nebulization, TID  vancomycin, 125 mg, oral, Daily  zinc oxide, 1 Application, Topical, BID       norepinephrine, 0.01-3 mcg/kg/min, Last Rate: 0.09 mcg/kg/min (01/31/24 1200)       PRN medications: dextrose 10 % in water (D10W), dextrose, glucagon, oxygen, sennosides-docusate sodium     OBJECTIVE  PHYSICAL EXAM:   Heart Rate:  [101-113]   Temp:  [36.1 °C (97 °F)-36.9 °C (98.4 °F)]   Resp:  [14-24]   Weight:  [101 kg (221 lb 12.5 oz)-103 kg (226 lb 3.1 oz)]   SpO2:  [83 %-100 %]   Body mass index is 41.37 kg/m².  Chronically ill-appearing elderly white woman  Pale skin  Right-sided hand swelling  Left-sided finger amputation  Internal jugular tunneled hemodialysis catheter in place  There is no significant pretibial edema on both lower extremities  Soft abdomen  No Amos  No obvious joint deformities  Moist mucosa  Hearing seems to be intact  Phonation intact    DATA:   Labs:  Results for orders placed or performed during the hospital encounter of  01/19/24 (from the past 96 hour(s))   POCT GLUCOSE   Result Value Ref Range    POCT Glucose 152 (H) 74 - 99 mg/dL   POCT GLUCOSE   Result Value Ref Range    POCT Glucose 78 74 - 99 mg/dL   Comprehensive metabolic panel   Result Value Ref Range    Glucose 100 (H) 65 - 99 mg/dL    Sodium 134 133 - 145 mmol/L    Potassium 4.0 3.4 - 5.1 mmol/L    Chloride 99 97 - 107 mmol/L    Bicarbonate 22 (L) 24 - 31 mmol/L    Urea Nitrogen 9 8 - 25 mg/dL    Creatinine 2.50 (H) 0.40 - 1.60 mg/dL    eGFR 20 (L) >60 mL/min/1.73m*2    Calcium 8.4 (L) 8.5 - 10.4 mg/dL    Albumin 2.7 (L) 3.5 - 5.0 g/dL    Alkaline Phosphatase 193 (H) 35 - 125 U/L    Total Protein 4.9 (L) 5.9 - 7.9 g/dL    AST 50 (H) 5 - 40 U/L    Bilirubin, Total 1.6 (H) 0.1 - 1.2 mg/dL    ALT 15 5 - 40 U/L    Anion Gap 13 <=19 mmol/L   Magnesium   Result Value Ref Range    Magnesium 1.70 1.60 - 3.10 mg/dL   CBC   Result Value Ref Range    WBC 14.6 (H) 4.4 - 11.3 x10*3/uL    nRBC 0.0 0.0 - 0.0 /100 WBCs    RBC 3.17 (L) 4.00 - 5.20 x10*6/uL    Hemoglobin 9.4 (L) 12.0 - 16.0 g/dL    Hematocrit 29.2 (L) 36.0 - 46.0 %    MCV 92 80 - 100 fL    MCH 29.7 26.0 - 34.0 pg    MCHC 32.2 32.0 - 36.0 g/dL    RDW 24.6 (H) 11.5 - 14.5 %    Platelets 139 (L) 150 - 450 x10*3/uL   Phosphorus   Result Value Ref Range    Phosphorus 2.4 (L) 2.5 - 4.5 mg/dL   POCT GLUCOSE   Result Value Ref Range    POCT Glucose 87 74 - 99 mg/dL   POCT GLUCOSE   Result Value Ref Range    POCT Glucose 103 (H) 74 - 99 mg/dL   POCT GLUCOSE   Result Value Ref Range    POCT Glucose 119 (H) 74 - 99 mg/dL   POCT GLUCOSE   Result Value Ref Range    POCT Glucose 132 (H) 74 - 99 mg/dL   POCT GLUCOSE   Result Value Ref Range    POCT Glucose 157 (H) 74 - 99 mg/dL   Phosphorus   Result Value Ref Range    Phosphorus 3.1 2.5 - 4.5 mg/dL   CBC   Result Value Ref Range    WBC 16.2 (H) 4.4 - 11.3 x10*3/uL    nRBC 0.0 0.0 - 0.0 /100 WBCs    RBC 3.20 (L) 4.00 - 5.20 x10*6/uL    Hemoglobin 9.4 (L) 12.0 - 16.0 g/dL    Hematocrit  29.7 (L) 36.0 - 46.0 %    MCV 93 80 - 100 fL    MCH 29.4 26.0 - 34.0 pg    MCHC 31.6 (L) 32.0 - 36.0 g/dL    RDW 24.7 (H) 11.5 - 14.5 %    Platelets 141 (L) 150 - 450 x10*3/uL   Comprehensive metabolic panel   Result Value Ref Range    Glucose 150 (H) 65 - 99 mg/dL    Sodium 131 (L) 133 - 145 mmol/L    Potassium 3.7 3.4 - 5.1 mmol/L    Chloride 97 97 - 107 mmol/L    Bicarbonate 19 (L) 24 - 31 mmol/L    Urea Nitrogen 12 8 - 25 mg/dL    Creatinine 3.20 (H) 0.40 - 1.60 mg/dL    eGFR 15 (L) >60 mL/min/1.73m*2    Calcium 8.6 8.5 - 10.4 mg/dL    Albumin 2.6 (L) 3.5 - 5.0 g/dL    Alkaline Phosphatase 183 (H) 35 - 125 U/L    Total Protein 4.8 (L) 5.9 - 7.9 g/dL    AST 34 5 - 40 U/L    Bilirubin, Total 1.7 (H) 0.1 - 1.2 mg/dL    ALT 14 5 - 40 U/L    Anion Gap 15 <=19 mmol/L   Magnesium   Result Value Ref Range    Magnesium 1.70 1.60 - 3.10 mg/dL   POCT GLUCOSE   Result Value Ref Range    POCT Glucose 143 (H) 74 - 99 mg/dL   POCT GLUCOSE   Result Value Ref Range    POCT Glucose 126 (H) 74 - 99 mg/dL   POCT GLUCOSE   Result Value Ref Range    POCT Glucose 141 (H) 74 - 99 mg/dL   CBC   Result Value Ref Range    WBC 15.6 (H) 4.4 - 11.3 x10*3/uL    nRBC 0.0 0.0 - 0.0 /100 WBCs    RBC 3.35 (L) 4.00 - 5.20 x10*6/uL    Hemoglobin 9.9 (L) 12.0 - 16.0 g/dL    Hematocrit 30.6 (L) 36.0 - 46.0 %    MCV 91 80 - 100 fL    MCH 29.6 26.0 - 34.0 pg    MCHC 32.4 32.0 - 36.0 g/dL    RDW 25.3 (H) 11.5 - 14.5 %    Platelets 169 150 - 450 x10*3/uL   Comprehensive metabolic panel   Result Value Ref Range    Glucose 210 (H) 65 - 99 mg/dL    Sodium 133 133 - 145 mmol/L    Potassium 4.2 3.4 - 5.1 mmol/L    Chloride 98 97 - 107 mmol/L    Bicarbonate 24 24 - 31 mmol/L    Urea Nitrogen 7 (L) 8 - 25 mg/dL    Creatinine 2.20 (H) 0.40 - 1.60 mg/dL    eGFR 23 (L) >60 mL/min/1.73m*2    Calcium 8.3 (L) 8.5 - 10.4 mg/dL    Albumin 2.6 (L) 3.5 - 5.0 g/dL    Alkaline Phosphatase 255 (H) 35 - 125 U/L    Total Protein 5.1 (L) 5.9 - 7.9 g/dL    AST 35 5 - 40 U/L     Bilirubin, Total 1.4 (H) 0.1 - 1.2 mg/dL    ALT 15 5 - 40 U/L    Anion Gap 11 <=19 mmol/L   Magnesium   Result Value Ref Range    Magnesium 1.70 1.60 - 3.10 mg/dL   Phosphorus   Result Value Ref Range    Phosphorus 2.0 (L) 2.5 - 4.5 mg/dL   POCT GLUCOSE   Result Value Ref Range    POCT Glucose 166 (H) 74 - 99 mg/dL   POCT GLUCOSE   Result Value Ref Range    POCT Glucose 167 (H) 74 - 99 mg/dL   POCT GLUCOSE   Result Value Ref Range    POCT Glucose 183 (H) 74 - 99 mg/dL   POCT GLUCOSE   Result Value Ref Range    POCT Glucose 112 (H) 74 - 99 mg/dL   CBC   Result Value Ref Range    WBC 15.9 (H) 4.4 - 11.3 x10*3/uL    nRBC 0.0 0.0 - 0.0 /100 WBCs    RBC 3.30 (L) 4.00 - 5.20 x10*6/uL    Hemoglobin 9.7 (L) 12.0 - 16.0 g/dL    Hematocrit 30.4 (L) 36.0 - 46.0 %    MCV 92 80 - 100 fL    MCH 29.4 26.0 - 34.0 pg    MCHC 31.9 (L) 32.0 - 36.0 g/dL    RDW 25.0 (H) 11.5 - 14.5 %    Platelets 200 150 - 450 x10*3/uL   Comprehensive metabolic panel   Result Value Ref Range    Glucose 160 (H) 65 - 99 mg/dL    Sodium 134 133 - 145 mmol/L    Potassium 4.1 3.4 - 5.1 mmol/L    Chloride 98 97 - 107 mmol/L    Bicarbonate 21 (L) 24 - 31 mmol/L    Urea Nitrogen 11 8 - 25 mg/dL    Creatinine 2.80 (H) 0.40 - 1.60 mg/dL    eGFR 18 (L) >60 mL/min/1.73m*2    Calcium 8.5 8.5 - 10.4 mg/dL    Albumin 2.5 (L) 3.5 - 5.0 g/dL    Alkaline Phosphatase 253 (H) 35 - 125 U/L    Total Protein 5.0 (L) 5.9 - 7.9 g/dL    AST 38 5 - 40 U/L    Bilirubin, Total 1.4 (H) 0.1 - 1.2 mg/dL    ALT 15 5 - 40 U/L    Anion Gap 15 <=19 mmol/L   Magnesium   Result Value Ref Range    Magnesium 1.70 1.60 - 3.10 mg/dL   Phosphorus   Result Value Ref Range    Phosphorus 2.6 2.5 - 4.5 mg/dL   POCT GLUCOSE   Result Value Ref Range    POCT Glucose 143 (H) 74 - 99 mg/dL   POCT GLUCOSE   Result Value Ref Range    POCT Glucose 144 (H) 74 - 99 mg/dL         SIGNATURE: Saeed Mondragon MD PATIENT NAME: Ayanna Gross   DATE: January 31, 2024 MRN: [id number]   TIME: 12:28 PM PAGER:  [phone]

## 2024-01-31 NOTE — PROGRESS NOTES
Ayanna Gross is a 73 y.o. female on day 12 of admission presenting with Septic shock (CMS/HCC).    Subjective   Interval History:   Patient seen and examined  Undergoing dialysis  Afebrile, no chills  Hospice meeting planned for later today  No diarrhea reported    Review of Systems    Objective   Range of Vitals (last 24 hours)  Heart Rate:  [101-113]   Temp:  [36.1 °C (97 °F)-36.9 °C (98.4 °F)]   Resp:  [14-24]   Weight:  [101 kg (221 lb 12.5 oz)]   SpO2:  [83 %-100 %]   Daily Weight  01/31/24 : 101 kg (221 lb 12.5 oz)    Body mass index is 40.56 kg/m².    Physical Exam  Constitutional:       Appearance: Normal appearance.   HENT:      Head: Normocephalic and atraumatic.      Nose: Nose normal.   Eyes:      Extraocular Movements: Extraocular movements intact.      Conjunctiva/sclera: Conjunctivae normal.   Cardiovascular:      Heart sounds: Normal heart sounds, S1 normal and S2 normal.   Pulmonary:      Breath sounds: Decreased breath sounds present.   Abdominal:      General: Bowel sounds are normal.      Palpations: Abdomen is soft.   Musculoskeletal:      Cervical back: Normal range of motion and neck supple.   Skin:     Comments: Stage III sacral ulcer, bilateral posterior heel eschar -photos examined  Neurological:      Mental Status: She is alert.        Antibiotics  aspirin tablet 325 mg  acetaminophen (Tylenol) tablet 650 mg  cefepime (Maxipime) 1 g in dextrose 5 % 50 mL IV  sodium chloride 0.9 % bolus 2,229 mL  apixaban (Eliquis) tablet 2.5 mg  escitalopram (Lexapro) tablet 10 mg  febuxostat (Uloric) tablet 40 mg  fenofibrate (Triglide) tablet 160 mg  gabapentin (Neurontin) capsule 100 mg  midodrine (Proamatine) tablet 15 mg  nystatin (Mycostatin) 100,000 unit/gram powder  oxyCODONE-acetaminophen (Percocet) 5-325 mg per tablet 1 tablet  simvastatin (Zocor) tablet 20 mg  piperacillin-tazobactam-dextrose (Zosyn) IV 2.25 g  vancomycin (Vancocin) capsule 125 mg  oxygen (O2) therapy  dextrose 50 % injection  25 g  glucagon (Glucagen) injection 1 mg  dextrose 10 % in water (D10W) infusion  insulin lispro (HumaLOG) injection 0-10 Units  nystatin (Mycostatin) 100,000 unit/gram powder 1 Application  vancomycin-diluent combo no.1 (Xellia) IVPB 1,750 mg  piperacillin-tazobactam-dextrose (Zosyn) IV 2.25 g  sodium chloride 0.9 % bolus 500 mL  norepinephrine (Levophed) 8 mg in dextrose 5% 250 mL (0.032 mg/mL) infusion (premix)  vancomycin (Vancocin) placeholder  pantoprazole (ProtoNix) EC tablet 40 mg  pantoprazole (ProtoNix) injection 40 mg  sennosides-docusate sodium (Judy-Colace) 8.6-50 mg per tablet 1 tablet  doxycycline (Vibramycin) in dextrose 5 % in water (D5W) 100 mL  mg  LORazepam (Ativan) injection 2 mg  magnesium sulfate IV 2 g  zinc oxide 20 % ointment 1 Application  nystatin (Mycostatin) cream  norepinephrine (Levophed) 8 mg in dextrose 5% 250 mL (0.032 mg/mL) infusion (premix)  heparin 1,000 unit/mL injection 2,000 Units  heparin 1,000 unit/mL injection 2,000 Units  nystatin (Mycostatin) ointment  acetaminophen (Tylenol) oral liquid 1,000 mg  albumin human 25 % solution 12.5 g  perflutren lipid microspheres (Definity) injection 0.5-10 mL of dilution  sulfur hexafluoride microsphr (Lumason) injection 24.28 mg  perflutren protein A microsphere (Optison) injection 0.5 mL  ipratropium-albuteroL (Duo-Neb) 0.5-2.5 mg/3 mL nebulizer solution 3 mL  sodium chloride 3 % nebulizer solution 3 mL  vancomycin-diluent combo no.1 (Xellia) IVPB 750 mg  sennosides-docusate sodium (Judy-Colace) 8.6-50 mg per tablet 1 tablet  acetaminophen (Tylenol) tablet 650 mg  doxycycline (Vibramycin) capsule 100 mg  magnesium oxide (Mag-Ox) tablet 400 mg  vasopressin (Vasostrict) 0.2 unit/mL infusion  norepinephrine (Levophed) 8 mg in dextrose 5% 250 mL (0.032 mg/mL) infusion (premix)  heparin 1,000 unit/mL injection 2,000 Units  heparin 1,000 unit/mL injection 2,000 Units  albumin human 25 % solution 12.5 g  vancomycin (Xellia) 1 g in 200  mL (Xellia) IVPB 1 g  ipratropium-albuteroL (Duo-Neb) 0.5-2.5 mg/3 mL nebulizer solution 3 mL  sodium chloride 3 % nebulizer solution 3 mL  albumin human 5 % infusion 12.5 g  heparin 1,000 unit/mL injection 2,000 Units  heparin 1,000 unit/mL injection 2,000 Units  vancomycin (Vancocin) capsule 125 mg  albumin human 25 % solution 12.5 g  heparin 1,000 unit/mL injection 2,000 Units  heparin 1,000 unit/mL injection 2,000 Units  ipratropium-albuteroL (Duo-Neb) 0.5-2.5 mg/3 mL nebulizer solution 3 mL  lidocaine (Xylocaine) 10 mg/mL (1 %) injection 50 mg  sodium phosphate 15 mmol in sodium chloride 0.9% 250 mL IV  sod phos di, mono-K phos mono (K Phos Neutral) tablet 250 mg  potassium, sodium phosphates (Phos-NaK) 280-160-250 mg packet 1 packet  doxycycline (Vibramycin) capsule 100 mg  fludrocortisone (Florinef) tablet 0.1 mg      Relevant Results  Labs  Results from last 72 hours   Lab Units 01/31/24  0423 01/30/24  0508 01/29/24  0331   WBC AUTO x10*3/uL 15.9* 15.6* 16.2*   HEMOGLOBIN g/dL 9.7* 9.9* 9.4*   HEMATOCRIT % 30.4* 30.6* 29.7*   PLATELETS AUTO x10*3/uL 200 169 141*     Results from last 72 hours   Lab Units 01/31/24  0423 01/30/24  0508 01/29/24  0331   SODIUM mmol/L 134 133 131*   POTASSIUM mmol/L 4.1 4.2 3.7   CHLORIDE mmol/L 98 98 97   CO2 mmol/L 21* 24 19*   BUN mg/dL 11 7* 12   CREATININE mg/dL 2.80* 2.20* 3.20*   GLUCOSE mg/dL 160* 210* 150*   CALCIUM mg/dL 8.5 8.3* 8.6   ANION GAP mmol/L 15 11 15   EGFR mL/min/1.73m*2 18* 23* 15*   PHOSPHORUS mg/dL 2.6 2.0* 3.1     Results from last 72 hours   Lab Units 01/31/24  0423 01/30/24  0508 01/29/24  0331   ALK PHOS U/L 253* 255* 183*   BILIRUBIN TOTAL mg/dL 1.4* 1.4* 1.7*   PROTEIN TOTAL g/dL 5.0* 5.1* 4.8*   ALT U/L 15 15 14   AST U/L 38 35 34   ALBUMIN g/dL 2.5* 2.6* 2.6*     Estimated Creatinine Clearance: 20.5 mL/min (A) (by C-G formula based on SCr of 2.8 mg/dL (H)).  C-Reactive Protein   Date Value Ref Range Status   12/25/2023 5.20 (H) 0.00 - 2.00 mg/dL  Final     CRP   Date Value Ref Range Status   06/23/2023 19.9 (H) 0 - 2.0 MG/DL Final     Comment:     Performed at [place] [id number]   05/22/2022 1.0 0 - 2.0 MG/DL Final     Comment:     Performed at [place] [id number]     Microbiology  Susceptibility data from last 14 days.  Collected Specimen Info Organism Ampicillin Cefazolin Cefazolin (uncomplicated UTIs only) Ciprofloxacin Gentamicin Piperacillin/Tazobactam Tetracycline Trimethoprim/Sulfamethoxazole   01/19/24 Urine from Indwelling (Amos) Catheter Proteus mirabilis S S S S S S R S   Reviewed   Imaging  Upper extremity venous duplex bilateral    Result Date: 1/25/2024           [place] [address]            Phone [phone]  Vascular Lab Report  [facility] UPPER EXTREMITY VENOUS DUPLEX BILATERAL Patient Name:      BASIA Gray Physician:  [id number] Mini Busby MD, RPVI Study Date:        1/25/2024           Ordering Provider:  [id number] ERI BERMAN MRN/PID:           [id number]            Fellow: Accession#:        [id number]        Technologist:       Dara Parr RVT Date of Birth/Age: 1950 / 73 years Technologist 2: Gender:            F                   Encounter#:         [id number] Admission Status:  Inpatient           Location Performed: [place]  Diagnosis/ICD: Right arm swelling-M79.89; Left arm swelling-M79.89 CPT Codes:     55445 Peripheral venous duplex scan for DVT complete  CONCLUSIONS:  Right Upper Venous: No evidence of acute deep vein thrombus visualized in the right upper extremity. Internal jugular vein was visualized in segments due to IV lines and bandages. Left Upper Venous: No evidence of acute deep vein thrombus visualized in the left upper extremity. Subclavian stent is noted and appears patent. There is a known occluded dialysis access  noted.  Additional Findings: Technically difficult exam due to IV lines, bandages, and patient's positioning.  Imaging & Doppler Findings:  Right               Compressible Thrombus        Flow Internal Jugular        Yes        None   Spontaneous/Phasic Subclavian              Yes        None Subclavian Proximal     Yes        None   Spontaneous/Phasic Subclavian Mid          Yes        None Subclavian Distal       Yes        None   Spontaneous/Phasic Axillary                Yes        None       Pulsatile Brachial                Yes        None Cephalic                Yes        None Basilic                 Yes        None  Left                Compress Thrombus        Flow Internal Jugular      Yes      None       Pulsatile Subclavian            Yes      None Subclavian Proximal   Yes      None       Pulsatile Subclavian Mid        Yes      None       Pulsatile Subclavian Distal     Yes      None       Pulsatile Axillary              Yes      None   Spontaneous/Phasic Brachial              Yes      None Cephalic              Yes      None Basilic               Yes      None  [id number] Mini Busby MD, RPVI Electronically signed by [id number] Mini Busby MD, RPVI on 1/25/2024 at 4:06:13 PM  ** Final **     ECG 12 lead    Result Date: 1/24/2024  Sinus tachycardia  with 1st degree AV block Right bundle branch block Possible Lateral infarct , age undetermined Abnormal ECG Confirmed by Yesika Nj ([id number]) on 1/24/2024 6:54:45 AM    XR tibia fibula right 2 views    Result Date: 1/22/2024  Interpreted By:  Real Mendieta, STUDY: XR TIBIA FIBULA RIGHT 2 VIEWS; 1/22/2024 2:15 pm   INDICATION: Signs/Symptoms:evaluate source of infection, osteomyelitis;   COMPARISON: None available.   ACCESSION NUMBER(S): [id number]   ORDERING CLINICIAN: BAN GIFFORD   TECHNIQUE: Views: AP and Lateral of the right tibia and fibula   FINDINGS: RESULT: There is no evidence for fracture or dislocation. Tricompartmental degenerative changes of  the right knee. The tibia and fibula appear intact without bony erosion or evidence for osteomyelitis. No other bony or soft tissue abnormality is identified. No subcutaneous gas is noted.       No evidence for acute osseous abnormality. No bony erosion to suggest osteomyelitis.   Signed by: Real Mendieta 1/22/2024 3:15 PM Dictation workstation:   [id number]    XR ankle right 3+ views    Result Date: 1/22/2024  Interpreted By:  Real Mendieta, STUDY: XR ANKLE RIGHT 3+ VIEWS; 1/22/2024 2:15 pm   INDICATION: Signs/Symptoms:Evuluate source of infection, osteomyelitis;   COMPARISON: None available.   ACCESSION NUMBER(S): [id number]   ORDERING CLINICIAN: BAN GIFFORD   TECHNIQUE: Views: AP, Lateral, Oblique, right ankle   FINDINGS: RESULT: There is no evidence for fracture or dislocation. The ankle mortise is intact. Joint spaces appear adequately maintained. No bony erosion to suggest osteomyelitis. No bone lesion or soft tissue abnormality is identified. No subcutaneous gas is seen.       No evidence for acute osseous abnormality. No bony erosion to suggest osteomyelitis.   Signed by: Real Mendieta 1/22/2024 3:14 PM Dictation workstation:   [id number]    Transthoracic Echo (TTE) Complete    Result Date: 1/22/2024           [place] [address]            Phone [phone] TRANSTHORACIC ECHOCARDIOGRAM REPORT  Patient Name:      BASIA GUAN GARY Gray Physician:   [id number] [facility] Study Date:        1/22/2024           Ordering Provider:   [id number] ERI BERMAN MRN/PID:           [id number]            Fellow: Accession#:        [id number]        Nurse: Date of Birth/Age: 1950 / 73 years Sonographer:         Tabatha Page RDCS Gender:            F                   Additional Staff: Height:            157.00 cm           Admit Date: Weight:            75.00 kg            Admission Status:     Inpatient - Routine BSA:               1.76 m2             Department Location: [facility] ICU Blood Pressure: 88 /49 mmHg Study Type:    TRANSTHORACIC ECHO (TTE) COMPLETE Diagnosis/ICD: Chronic combined systolic (congestive) and diastolic (congestive)                heart failure (CHF)-I50.42; Sepsis, unspecified organism-A41.9 Indication:    heart failure,septic shock CPT Codes:     Echo Complete w Full Doppler-99035 Patient History: BMI:               Obese >30 Pertinent History: Sepsis, PVD, A-Fib, CVA, Cancer and HTN. breast                    prosthesis/ca, ESRD,anemia,septic shock,heart failure. Study Detail: The following Echo studies were performed: 2D, M-Mode, Doppler and               color flow. Technically challenging study due to prosthesis,               prominent lung artifact and body habitus.  PHYSICIAN INTERPRETATION: Left Ventricle: Left ventricular systolic function is normal, with an estimated ejection fraction of 70%. There are no regional wall motion abnormalities. The left ventricular cavity size is normal. Left ventricular diastolic filling was indeterminate. Left Atrium: The left atrium is moderately dilated. Right Ventricle: The right ventricle is normal in size. There is normal right ventricular global systolic function. Right Atrium: The right atrium is normal in size. Aortic Valve: The aortic valve is trileaflet. There is no evidence of aortic valve regurgitation. The peak instantaneous gradient of the aortic valve is 2.8 mmHg. Mitral Valve: The mitral valve is normal in structure. There is no evidence of mitral valve regurgitation. Tricuspid Valve: The tricuspid valve is structurally normal. There is trace tricuspid regurgitation. Pulmonic Valve: The pulmonic valve is not well visualized. The pulmonic valve regurgitation was not well visualized. Pericardium: There is no pericardial effusion noted. Aorta: The aortic root is normal.  CONCLUSIONS:  1. Left ventricular systolic function is  normal with a 70% estimated ejection fraction.  2. The left atrium is moderately dilated.  3. Left ventricular diastolic filling indeterminate. QUANTITATIVE DATA SUMMARY: 2D MEASUREMENTS:                          Normal Ranges: LAs:           4.50 cm   (2.7-4.0cm) IVSd:          0.61 cm   (0.6-1.1cm) LVPWd:         0.88 cm   (0.6-1.1cm) LVIDd:         3.66 cm   (3.9-5.9cm) LV Mass Index: 41.9 g/m2 LV SYSTOLIC FUNCTION BY 2D PLANIMETRY (MOD):                     Normal Ranges: EF-A4C View: 68.3 % (>=55%) LV DIASTOLIC FUNCTION:                     Normal Ranges: MV Peak E: 1.19 m/s (0.7-1.2 m/s) MV Peak A: 0.90 m/s (0.42-0.7 m/s) E/A Ratio: 1.32     (1.0-2.2) MITRAL VALVE:                 Normal Ranges: MV DT: 192 msec (150-240msec) AORTIC VALVE:                         Normal Ranges: AoV Vmax:      0.84 m/s (<=1.7m/s) AoV Peak P.8 mmHg (<20mmHg) LVOT Max Shimon:  0.47 m/s (<=1.1m/s) LVOT Diameter: 1.90 cm  (1.8-2.4cm) AoV Area,Vmax: 1.57 cm2 (2.5-4.5cm2) TRICUSPID VALVE/RVSP:                   Normal Ranges: IVC Diam: 1.05 cm  [id number] [facility] Electronically signed on 2024 at 2:53:04 PM  ** Final **     XR foot left 3+ views    Result Date: 2024  Interpreted By:  Real Mendieta, STUDY: XR FOOT LEFT 1-2 VIEWS; 2024 4:15 pm   INDICATION: Signs/Symptoms:osteomyelitis. Pain   COMPARISON: 2023   ACCESSION NUMBER(S): [id number]   ORDERING CLINICIAN: BAN GIFFORD   TECHNIQUE: Views:  AP, Lat, Oblique, left foot   FINDINGS: RESULT: There is no evidence for fracture or dislocation. 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 Mild hammertoe deformities. Joint spaces appear adequately maintained. Soft tissue ulceration of the posterior aspect of the heel however no evidence for bony erosion to suggest osteomyelitis.       No evidence for acute fracture or acute bony erosion to suggest osteomyelitis. Chronic changes as described.   Signed by: Real Mendieta 2024 7:15  PM Dictation workstation:   [id number]    CT chest abdomen pelvis wo IV contrast    Result Date: 1/21/2024  Interpreted By:  Maria Garcia, STUDY: CT CHEST ABDOMEN PELVIS WO CONTRAST;  1/20/2024 11:42 pm   INDICATION: Mental status change. Elevated white blood cell count with infectious workup.   COMPARISON: 08/20/2023   ACCESSION NUMBER(S): [id number]   ORDERING CLINICIAN: BAN GIFFORD   TECHNIQUE: CT of the chest, abdomen and pelvis was performed with no oral or intravenous contrast administered. Sagittal and coronal reformations were completed by the technologist at the acquisition scanner.   All CT examinations are performed with 1 or more of the following dose reduction techniques: Automated exposure control, adjustment of mA and/or kv according to patient's size, or use of iterative reconstruction techniques.   FINDINGS: Please note that the study is limited without intravenous contrast.   CHEST: Bilateral pleural effusions are present with right effusion moderately small in size and with the left effusion moderately small in size as well. There is shift of the heart and mediastinum to the left of midline due to atelectasis of the left upper lobe. There is consolidation throughout left lower lobe with air bronchograms noted. On the right, there is compressive atelectasis seen posteriorly in the right lower lobe.   There is mild reactive mediastinal adenopathy seen at this time with mediastinal lymph nodes increased in size since prior study. Several of these mediastinal nodes are at the upper limits of normal measuring 8 mm in short axis diameter.   No pericardial effusion is present. The cardiac size is normal with mitral annulus calcification.   There has been prior bilateral mastectomy with reconstruction of the left breast with implant placement. Surgical clips are visible within the left axilla. Stent grafts are visible within the left upper extremity and within the left innominate, subclavian as well as  axillary veins.   A peripherally calcified 1.8 cm nodule arises from the lower pole of left thyroid lobe.   ABDOMEN:   LIVER: Unremarkable.   BILE DUCTS: No intrahepatic or extrahepatic biliary ductal dilatation is seen.   GALLBLADDER: Cholelithiasis is observed with some calcification of the gallbladder wall demonstrated as well. No gallbladder wall thickening or pericholecystic fluid is evident.   PANCREAS: Unremarkable   SPLEEN: Unremarkable   ADRENAL GLANDS: Unremarkable   KIDNEYS AND URETERS: Kidneys are small in size with extensive renal artery calcification demonstrated.   PELVIS:   BLADDER: Amos catheter is present within the decompressed urinary bladder.   REPRODUCTIVE ORGANS: Calcification of the arcuate arteries within the uterus is seen. There is no uterine enlargement or adnexal mass.   BOWEL: A rectal balloon is seen. There is no abnormal distention of the intestinal tract.   VESSELS: There is atherosclerosis of the thoracoabdominal aorta and iliac arteries with calcification in the walls of the celiac, splenic, hepatic, and mesenteric arteries.   PERITONEUM/RETROPERITONEUM/LYMPH NODES: There is a minimal amount of ascites seen about the liver and spleen with mild presacral edema noted.   ABDOMINAL WALL: There is anasarca involving the soft tissues of the abdomen and pelvis. Postoperative change from ventral hernia repair is seen.   BONES: Bilateral sacroiliitis is seen. There is lumbar dextroscoliosis. Chronic rotator cuff tear is present bilaterally with osteoarthritis of both shoulders, right greater than left.       Chest 1.  Moderately small bilateral pleural effusions with left upper lobe atelectasis and compressive atelectasis seen posteriorly in right lower lobe. A left lower lobe pneumonia is identified. There is extensive airspace consolidation within left lower lobe with air bronchograms observed. Mild mediastinal reactive adenopathy is present as well.   Abdomen-Pelvis 1.  Cholelithiasis  without evidence of cholecystitis. 2. Small amount of ascites with mild presacral edema and anasarca within the soft tissues of the abdominal wall. 3. Renal atrophy with extensive vascular calcifications.     MACRO: None   Signed by: Maria Garcia 1/21/2024 8:30 AM Dictation workstation:   [id number]    CT head wo IV contrast    Result Date: 1/21/2024  Interpreted By:  Maria Garcia, STUDY: CT HEAD WO IV CONTRAST 1/20/2024 11:42 pm   INDICATION: Signs/Symptoms:mental status changes   COMPARISON: 12/11/2023   ACCESSION NUMBER(S): [id number]   ORDERING CLINICIAN: BAN GIFFORD   TECHNIQUE: Unenhanced axial images of the brain are completed.   All CT examinations are performed with 1 or more of the following dose reduction techniques: Automated exposure control, adjustment of mA and/or kv according to patient's size, or use of iterative reconstruction techniques.   FINDINGS: Helical unenhanced axial images of the brain demonstrate a mild to moderate degree of ventricular enlargement with proportionate widening of the sulci and sylvian fissures. There is no midline shift, mass effect, extra-axial fluid collection, or acute intracranial hemorrhage. There is diminished density seen in the periventricular white matter indicating chronic microvascular ischemic disease. There is calcified plaque seen within the distal vertebral and internal carotid arteries bilaterally. No calvarial abnormality is seen.       Atrophy and chronic microvascular ischemic disease without acute intracranial process.   Signed by: Maria Garcia 1/21/2024 8:09 AM Dictation workstation:   [id number]    XR chest 1 view    Result Date: 1/20/2024  Interpreted By:  Brendon Perez, STUDY: XR CHEST 1 VIEW; 1/20/2024 5:03 pm   INDICATION: CLINICAL INFORMATION: Signs/Symptoms:Insertion right IJ central line, also left thoracentesis.   COMPARISON: 01/19/2024 at 1338 hours   ACCESSION NUMBER(S): [id number]   ORDERING CLINICIAN: BOZENA ANTONIO   TECHNIQUE: Portable  chest one view.   FINDINGS: The cardiac size is indeterminate in view of the AP projection. There is no change in the right-sided dual port central venous catheter. There is a new right internal jugular venous catheter present with the tip at approximately same level as the dual port central venous catheter, overlying the mid to lower right atrium. There is no evidence for pneumothorax. Patient has a history of left thoracentesis without evidence for pneumothorax on the left. There is bilateral basilar alveolar infiltrate and effusions. Left effusion is decreased compared to the study from 1 day earlier.   Surgical clips are identified within left chest wall. Endovascular stent is identified in the distribution of the left subclavian artery.       1. Status post right-sided central venous catheter placement as described above with the tip overlying the mid to caudal aspect of the right atrium. There is no evidence for pneumothorax. 2. No evidence for left-sided pneumothorax after left-sided thoracentesis. Decrease in the left effusion. 3. Bilateral basilar infiltrates and effusions are present. Follow-up to assure complete clearing is suggested.   MACRO: none   Signed by: Brendon Perez 1/20/2024 5:11 PM Dictation workstation:   [id number]    XR chest 1 view    Result Date: 1/19/2024  Interpreted By:  Real Mendieta, STUDY: XR CHEST 1 VIEW; 1/19/2024 1:38 pm   INDICATION: Signs/Symptoms:dyspnea.   COMPARISON: 12/26/2023   ACCESSION NUMBER(S): [id number]   ORDERING CLINICIAN: EMELY GOLDSMITH   TECHNIQUE: 1 view of the chest was performed.   FINDINGS: There may be a minimal right pleural effusion. Slight prominence of the interstitium otherwise the right lung is clear. Improved appearance of the left lung with improved aeration of the right upper lobe. There is opacification of the left lower lobe possibly due to large pleural effusion which is similar to the prior study. No pneumothorax. Right-sided dual lumen  central line catheter with tip at the proximal atrium. The cardiomediastinal silhouette is within normal limits. Left-sided subclavian stents are again noted.       Improved aeration and appearance of the left lung superiorly however there is a persistent large left pleural effusion and opacification of the left lower lobe.   Signed by: Real Mendieta 1/19/2024 1:44 PM Dictation workstation:   [id number]    Electrocardiogram, 12-lead PRN ACS symptoms    Result Date: 1/7/2024  Sinus rhythm Right bundle branch block Septal infarct (cited on or before 06-DEC-2023) Abnormal ECG When compared with ECG of 06-DEC-2023 13:06, Questionable change in initial forces of Septal leads Confirmed by Herminio Lemus ([id number]) on 1/7/2024 11:04:01 AM     Assessment/Plan     Septic shock-on pressors-resolving  Left-sided pleural effusion   Left lower lobe pneumonia-treated  Proteus urinary tract infection-treated  History of MRSA endocarditis  History of C. 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

## 2024-01-31 NOTE — PROGRESS NOTES
Ayanna Gross is a 73 y.o. female on day 12 of admission presenting with Septic shock (CMS/HCC).    Subjective   Symptoms (0 - 10, Best to Worst)  [facility] Symptom Assessment System  Pain Score: 0 - No pain    On dialysis currently, patient is awake and oriented appears capable.  Complains of pain to sacrum from rash/wound, also complains of bilateral heel pain described as constant aching mild to moderate in intensity.    Objective     Physical Exam  Vitals and nursing note reviewed.   Constitutional:       General: She is not in acute distress.     Appearance: She is ill-appearing.   HENT:      Head: Normocephalic and atraumatic.      Nose: Nose normal.      Mouth/Throat:      Mouth: Mucous membranes are moist.      Pharynx: Oropharynx is clear.   Eyes:      Extraocular Movements: Extraocular movements intact.      Pupils: Pupils are equal, round, and reactive to light.   Cardiovascular:      Rate and Rhythm: Normal rate and regular rhythm.      Pulses: Normal pulses.      Heart sounds: No murmur heard.  Pulmonary:      Effort: Pulmonary effort is normal. No respiratory distress.      Breath sounds: Normal breath sounds.   Abdominal:      General: Abdomen is flat. Bowel sounds are normal. There is no distension.      Palpations: Abdomen is soft.      Tenderness: There is no abdominal tenderness.   Musculoskeletal:         General: No swelling, tenderness, deformity or signs of injury. Normal range of motion.      Cervical back: Normal range of motion and neck supple. No rigidity.   Skin:     General: Skin is warm and dry.      Capillary Refill: Capillary refill takes 2 to 3 seconds.      Comments: Back and buttocks are not assessed, Documented rash, but pictures in epic showing erythematous rash with well-defined borders  Vascular ulcerations noted to the left anterior shin and bilateral heels with stable black eschar.   Neurological:      General: No focal deficit present.      Mental Status: She is alert and  "oriented to person, place, and time.      Motor: Weakness present.   Psychiatric:         Mood and Affect: Mood normal.         Last Recorded Vitals  Blood pressure (!) 63/23, pulse 98, temperature 36.9 °C (98.4 °F), temperature source Temporal, resp. rate 16, height 1.575 m (5' 2\"), weight 103 kg (226 lb 3.1 oz), SpO2 97 %.  Intake/Output last 3 Shifts:  I/O last 3 completed shifts:  In: 918.9 (9.9 mL/kg) [P.O.:480; I.V.:438.9 (4.7 mL/kg)]  Out: 675 (7.3 mL/kg) [Stool:675]  Dosing Weight: 93 kg     Relevant Results  Results for orders placed or performed during the hospital encounter of 01/19/24 (from the past 24 hour(s))   POCT GLUCOSE   Result Value Ref Range    POCT Glucose 183 (H) 74 - 99 mg/dL   POCT GLUCOSE   Result Value Ref Range    POCT Glucose 112 (H) 74 - 99 mg/dL   CBC   Result Value Ref Range    WBC 15.9 (H) 4.4 - 11.3 x10*3/uL    nRBC 0.0 0.0 - 0.0 /100 WBCs    RBC 3.30 (L) 4.00 - 5.20 x10*6/uL    Hemoglobin 9.7 (L) 12.0 - 16.0 g/dL    Hematocrit 30.4 (L) 36.0 - 46.0 %    MCV 92 80 - 100 fL    MCH 29.4 26.0 - 34.0 pg    MCHC 31.9 (L) 32.0 - 36.0 g/dL    RDW 25.0 (H) 11.5 - 14.5 %    Platelets 200 150 - 450 x10*3/uL   Comprehensive metabolic panel   Result Value Ref Range    Glucose 160 (H) 65 - 99 mg/dL    Sodium 134 133 - 145 mmol/L    Potassium 4.1 3.4 - 5.1 mmol/L    Chloride 98 97 - 107 mmol/L    Bicarbonate 21 (L) 24 - 31 mmol/L    Urea Nitrogen 11 8 - 25 mg/dL    Creatinine 2.80 (H) 0.40 - 1.60 mg/dL    eGFR 18 (L) >60 mL/min/1.73m*2    Calcium 8.5 8.5 - 10.4 mg/dL    Albumin 2.5 (L) 3.5 - 5.0 g/dL    Alkaline Phosphatase 253 (H) 35 - 125 U/L    Total Protein 5.0 (L) 5.9 - 7.9 g/dL    AST 38 5 - 40 U/L    Bilirubin, Total 1.4 (H) 0.1 - 1.2 mg/dL    ALT 15 5 - 40 U/L    Anion Gap 15 <=19 mmol/L   Magnesium   Result Value Ref Range    Magnesium 1.70 1.60 - 3.10 mg/dL   Phosphorus   Result Value Ref Range    Phosphorus 2.6 2.5 - 4.5 mg/dL   POCT GLUCOSE   Result Value Ref Range    POCT Glucose 143 " (H) 74 - 99 mg/dL   POCT GLUCOSE   Result Value Ref Range    POCT Glucose 144 (H) 74 - 99 mg/dL    No results found.     Assessment/Plan   IMP:    1.Acute Metabolic Encephalopathy, resolving    2.Recurrent Pleural Effusions, CHF   3. Septic Shock  4. Vasculopathy   5. Hx of Atrial fibrillation   6. Hx of endocarditis    7.  Malnutrition  8. ESRD   9. Metabolic lactic acidosis, resolved   10. T2DM  11. PAD with vascular/pressure wounds  12. Hx of recurrent C. Diff infection  13. Hx of endocarditis     Full code full  Capable  Documented healthcare power of  is the daughter Meka, copy of POA in the chart.    Chart reviewed, discussed with attending service, this is a patient had initially presented with septic shock respiratory failure requiring pressor support and multiple thoracenteses, has slowly been able to titrate off of her higher oxygen requirements is currently on 2 L nasal cannula.  She is now off of IV antibiotics with the exception of doxycycline which she is on for prophylaxis for her history of MRSA endocarditis/bacteremia and oral vancomycin for her history of C. difficile.  However she remains on Levophed with a systolic in the 70s.  Patient is unable to further titrate on the Levophed and we are faced with a decision regarding proceeding forward with transition to LTAC however patient is making statements about wishing to go home.  Per attending service, they had an extensive 2-hour discussion with the patient herself today as she was capable, please see note from attending service.  Patient stating that she wished to go home, considering hospice for supportive care very aware that her status is quite fragile and that she is progressively worsened to a point where we cannot sustain her outside of a hospital type setting.  Previous goals of care discussion had established a full code with an aggressive disease modifying model of care, and daughters were supportive of this.  Yet this point  there seems to be conflict between what the patient is currently stating and the previously established goals, I discussed this with the attending service and we both agree that a family meeting might be in order in order to firmly establish goals moving forward and establish a discharge plan, as there appears to be nothing that we are acutely treating.  I reached out to the daughter Libby was unable to leave a voicemail.  I reached out to the daughter Shannon, we discussed briefly need to set up family meeting to discuss goals of care and discharge planning, she is off work and will be available tomorrow at 430, she later called me back and confirmed that her sister was also available at this time.    1/31/24  Patient condition remains stable, but dependent on pressor support at this point in time, patient tearful when she asked me if she was doing today, I did discuss that overall prognosis and quality of life at this point are poor and family meeting today is to discuss options moving forward.  Patient kept repeating that she wanted to go home even if it was just for day.  In regards to her pain she does have some increased sacral and bilateral heel discomfort I did increase Tylenol and gabapentin, patient previously has done well with Percocet will hold stronger pain meds at this point in time due to hypotension.  We will also trial Dermoplast to the patient's sacral wound for topical anesthetic.    Addendum: Family meeting with Vinod Livingston PA-C, jose Sanchez, jose Ackerman, and patient. Discussion regarding poor prognosis, no forward progress on levophed with worsening tolerance of dialysis. Increased pain to sacrum and heels. Hospice referral called in, meeting set up with family for Friday at 1730.     I spent 90 minutes in the professional and overall care of this patient.      Jazmin Sanchez, APRN-CNP

## 2024-01-31 NOTE — PROGRESS NOTES
[facility]       Date:  1/31/2024  Patient:  Ayanna Gross  YOB: 1950  MRN:  [id number]   Admit Date:  1/19/2024  ========================================================================================================    Chief Complaint   Patient presents with    Shortness of Breath     Patient with SOB and fever. Is very lethargic. Dialysis MWF but did not go today dt SOB and fever         History of Present Illness:  Ayanna Gross is a 73 y.o. year old female patient with Past Medical History of  esenting with anemia, fever worsening sacral and lower extremity wounds and concerns from the nursing facility for left pneumonia. Patient presents to the emergency department found to be febrile she was hypotensive but responded to 500 cc fluid bolus she was found to have a hemoglobin of 6 she was begun and ordered for a single unit of blood transfusion will actually provide 2 units of packed cells additional IV fluids to complete her volume resuscitation given her fever clinical evidence of hypovolemia and infection with an elevated lactate of 2.3 she received a total of approximately 1800 mL of fluid she is DNR/DNI by advanced directives but ICU level care is acceptable her granddaughter questions disorder but it was confirmed and signed by her  who is her POA.  She will be admitted to the intensive care unit given her frail compromised state and high risk of morbidity and mortality necessitating IV antibiotics fluids blood transfusion and ultimately will receive dialysis likely tomorrow wound care consultation to general surgery has been requested as well as consultation with the intensivist infectious disease and nephrology wounds are cultured C. difficile is requested she remains on oral vancomycin for suppression given recent C. difficile infection in December she is initiated on pharmacy dosing of vancomycin and Zosyn 2.25 g IV every 6 hours. She continues on Eliquis  at this time for DVT prophylaxis as well as atrial fibrillation.     Further Hx: Ayanna Gross 73-year-old female with past medical history of atrial fibrillation, end-stage renal disease, C. difficile infection, moderate sized recurrent left pleural effusions, and MRSA bacteremia with mitral valve vegetation admitted to ICU for management of septic shock.  On morning exam patient was on 0.22 of levo just to maintain a MAP of 60 and SBP of 88.  Last documented echocardiogram from [facility] shows EF between 55 to 60% with grade 2 left ventricular diastolic dysfunction, and moderate circumferential pericardial effusion.  Unable to find adequate documentation of who treated her endocarditis, but there was documentation that she was currently still receiving treatment when she was admitted to [facility]. On further chart review Dr. Coulter is her infectious disease doctor. She also had 2 thoracentesis's for moderate to large sided left pleural effusions with cytology showing no malignancy, but unable to differentiate between exudative and transudative at this time.        Interval ICU Events:  1/20: Pt missed last dialysis appointment, no signs of urgent need today, but nephrology Dr. Cabello is following. Pt on high requirements of levophed. She is on broad spectrum abx for coverage currently ID Dr. Coulter following. Pt switches from being A&O x 3 and lethargy. Goal today is to get more central access, david, and thoracentesis.      1/21: Pt more lethargic this AM requiring uptitration of her Levophed to 0.28. Diagnostic and therapeutic thoracentesis yesterday over 1L of hazy straw color fluid. Currently still on vanc, zosyn, and doxy for abx. Potential dialysis this today, but no urgency.      1/22: Pt come in and out of lethargic states throughout the day. Still on high doses of levophed this morning. Per nephrologist Dr. Cabello titrate for an SBP goal of 70 and mentation. Left Pleural  fluid analysis resulted as transudative per lights criteria. Tablo today per nephrology. ID managing with empiric coverage. Source control may not have been met. Podiatry, wound, and surgery to evaluate multiple wounds over the body.      1/23: Pt received dialysis via tablo yesterday. Very similar status as yesterday from lethargic states to well mentation. Patient remains on Levophed still at 0.36 with empiric coverage with vanc + zosyn & doxy. Increase in patients total bilirubin today 0.9-1.6. Consulting services include: Wound, Vascular, Podiatry, Acute surgery, Palliative, Infectious Disease, and Nephrology.      1/24: Patient stable overnight, norepinephrine weaned down to 0.16 with stable pressures and new goal of systolics > 70     1/25: Patient resting comfortably in bed this morning requiring 0.05 of Levophed for systolic goal of 70. She is more alert than prior and complains of less pain. She will receive Tablo today with 1.5L of fluid removed per Nephrology.      1/26: Patient resting comfortably in bed this morning requiring 0.06 Levophed and midodrine 15 mg TID. Tablo yesterday with 1.5L removed. Empiric abx stopped 2 days ago. SLP signed off with recommendations.       1/27: Patient resting comfortable in bed this morning requiring 0.06 of levophed and midodrine 15 mg TID. Potential Tablo today. No acute events overnight. Patient remains at baseline.     1/29: No acute events overnight.  Pt resting comfortable in bed this morning requiring 0.04 of levophed and midodrine 15mg TID. Plan for dialysis via tablo today per nephrology.      1/30: Pt requiring increased levophed overnight at .12 and down do .09 this morning.  Dialysis via tablo yesterday with -1L.  Pt started on fludrocortisone 0.1 tid.  Spent extensive time with the patient discussing goals of care and plan for a family meeting tomorrow.     1/31: Pt maintained on 0.09 of levophed overnight.  Plan for dialysis via tablo today.  Continue  goals of care discussion with a family meeting scheduled tonight at 1630.      Medical History:  Past Medical History:   Diagnosis Date    Asthma     CAD (coronary artery disease)     CHF (congestive heart failure) (CMS/[facility])     Chronic kidney disease on chronic dialysis (CMS/[facility])     Hypertension     Personal history of diseases of the blood and blood-forming organs and certain disorders involving the immune mechanism     History of autoimmune disorder    Sleep apnea     Type 2 diabetes mellitus without complications (CMS/[facility]) 09/15/2022    Diabetes mellitus     Past Surgical History:   Procedure Laterality Date    CARPAL TUNNEL RELEASE  11/18/2013    Neuroplasty Decompression Median Nerve At Carpal Tunnel    HAND SURGERY  11/18/2013    Hand Surgery                                                                                                                                                          MASTECTOMY, PARTIAL  04/10/2014    Right Breast Partial Mastectomy    MR HEAD ANGIO WO IV CONTRAST  8/29/2023    MR HEAD ANGIO WO IV CONTRAST [facility]    OTHER SURGICAL HISTORY  11/18/2013    Breast Reconstruction With Implant Prosthesis    OTHER SURGICAL HISTORY  11/18/2013    Thyroid Surgery Substernal Thyroidectomy Partial    OTHER SURGICAL HISTORY  11/18/2013    Modified Radical Mastectomy Left Breast    OTHER SURGICAL HISTORY  04/04/2022    Carpal tunnel surgery    OTHER SURGICAL HISTORY  04/04/2022    Foot surgery    OTHER SURGICAL HISTORY  04/04/2022    Hernia repair    SENTINEL LYMPH NODE BIOPSY  04/10/2014    [place] Lymph Node Biopsy    TONSILLECTOMY  11/18/2013    Tonsillectomy    UMBILICAL HERNIA REPAIR  11/18/2013    Umbilical Hernia Repair    US GUIDED PERCUTANEOUS PLACEMENT  6/29/2023    US GUIDED PERCUTANEOUS PLACEMENT [facility]     Medications Prior to Admission   Medication Sig Dispense Refill Last Dose    apixaban (Eliquis) 2.5 mg tablet Take 1 tablet (2.5 mg) by mouth 2 times  a day.       blood sugar diagnostic strip 1 x daily e11.65 for 90 days       blood-glucose sensor (Dexcom G6 Sensor) device Check blood sugar when needed and as instructed 6 each 3     doxycycline (Vibramycin) 100 mg capsule Take 1 capsule (100 mg) by mouth once daily. Take with at least 8 ounces (large glass) of water, do not lie down for 30 minutes after Do not start before January 6, 2024.       escitalopram (Lexapro) 10 mg tablet 1 tablet Orally Once a day for 30 day(s)       febuxostat (Uloric) 40 mg tablet TAKE ONE TABLET BY MOUTH EVERY OTHER DAY       fenofibrate (Triglide) 160 mg tablet Take 1 tablet (160 mg) by mouth once daily.       gabapentin (Neurontin) 100 mg capsule Take 1 capsule (100 mg) by mouth 2 times a day.       insulin lispro (HumaLOG) 100 unit/mL injection Inject 0-0.1 mL (0-10 Units) under the skin 3 times a day with meals. Take as directed per insulin instructions.       midodrine (Proamatine) 5 mg tablet Take 3 tablets (15 mg) by mouth 3 times a day with meals.       nystatin (Mycostatin) 100,000 unit/gram powder APPLY 1 GRAM TO SKIN TWO TIMES A DAY 30 g 0     oxyCODONE-acetaminophen (Percocet) 5-325 mg tablet Take 1 tablet by mouth every 12 hours if needed for severe pain (7 - 10). 120 tablet 0     simvastatin (Zocor) 20 mg tablet TAKE 1 TABLET BY MOUTH DAILY 60 tablet 5      Patient has no known allergies.  Social History     Tobacco Use    Smoking status: Never    Smokeless tobacco: Never   Substance Use Topics    Alcohol use: Not Currently    Drug use: Never     Family History   Problem Relation Name Age of Onset    Lymphoma Mother      Prostate cancer Father          passed away fabiana min 2017       Review of Systems:  14 point review of systems was completed and negative except for those specially mention in my HPI    Physical Exam:    Physical Exam  Constitutional:       Appearance: She is ill-appearing.   HENT:      Head: Normocephalic.      Mouth/Throat:      Mouth: Mucous  membranes are moist.      Pharynx: Oropharynx is clear.   Eyes:      Extraocular Movements: Extraocular movements intact.      Pupils: Pupils are equal, round, and reactive to light.   Cardiovascular:      Rate and Rhythm: Normal rate and regular rhythm.      Pulses: Normal pulses.      Heart sounds: Normal heart sounds.   Pulmonary:      Effort: Pulmonary effort is normal.      Breath sounds: Wheezing and rales present.   Abdominal:      General: Bowel sounds are normal.      Palpations: Abdomen is soft.   Musculoskeletal:         General: Swelling present.      Cervical back: Normal range of motion.      Right lower leg: Edema present.      Left lower leg: Edema present.   Skin:     General: Skin is warm.      Capillary Refill: Capillary refill takes more than 3 seconds.      Coloration: Skin is pale.      Findings: Bruising and erythema present.      Comments: Ulcers on bilateral heels and left shin.  Ulcer on sacrum.  Skin tear to Left hand.  Edema to bilateral uppers but R>L.      Neurological:      Mental Status: She is alert and oriented to person, place, and time.      Motor: Weakness present.   Psychiatric:         Mood and Affect: Mood normal.         Behavior: Behavior normal.         Vitals:  Most recent :  BP: 78/50  Pulse: 106  Resp: 15  Temp: 36.9  SpO2: 94%    24hr Min/Max:  Temp  Min: 36.1 °C (97 °F)  Max: 36.9 °C (98.4 °F)  Pulse  Min: 101  Max: 113  Resp  Min: 14  Max: 24  SpO2  Min: 83 %  Max: 100 %      Objective:    Scheduled Medications:  acetaminophen, 650 mg, oral, TID  apixaban, 2.5 mg, oral, BID  doxycylcine, 100 mg, oral, Daily  escitalopram, 10 mg, oral, Nightly  fludrocortisone, 0.1 mg, oral, TID  gabapentin, 100 mg, oral, BID  heparin, 2,000 Units, intra-catheter, After Dialysis  heparin, 2,000 Units, intra-catheter, After Dialysis  insulin lispro, 0-10 Units, subcutaneous, TID with meals  ipratropium-albuteroL, 3 mL, nebulization, q6h while awake  midodrine, 15 mg, oral, TID with  meals  nystatin, , Topical, BID  nystatin, , Topical, BID  pantoprazole, 40 mg, oral, Daily   Or  pantoprazole, 40 mg, intravenous, Daily  sodium chloride, 3 mL, nebulization, TID  vancomycin, 125 mg, oral, Daily  zinc oxide, 1 Application, Topical, BID        Continuous Medications:  norepinephrine, 0.01-3 mcg/kg/min, Last Rate: 0.09 mcg/kg/min (01/31/24 1200)        PRN Medications:  PRN medications: dextrose 10 % in water (D10W), dextrose, glucagon, oxygen, sennosides-docusate sodium    Labs/Radiology/Diagnostic Review:  Results for orders placed or performed during the hospital encounter of 01/19/24 (from the past 24 hour(s))   POCT GLUCOSE   Result Value Ref Range    POCT Glucose 183 (H) 74 - 99 mg/dL   POCT GLUCOSE   Result Value Ref Range    POCT Glucose 112 (H) 74 - 99 mg/dL   CBC   Result Value Ref Range    WBC 15.9 (H) 4.4 - 11.3 x10*3/uL    nRBC 0.0 0.0 - 0.0 /100 WBCs    RBC 3.30 (L) 4.00 - 5.20 x10*6/uL    Hemoglobin 9.7 (L) 12.0 - 16.0 g/dL    Hematocrit 30.4 (L) 36.0 - 46.0 %    MCV 92 80 - 100 fL    MCH 29.4 26.0 - 34.0 pg    MCHC 31.9 (L) 32.0 - 36.0 g/dL    RDW 25.0 (H) 11.5 - 14.5 %    Platelets 200 150 - 450 x10*3/uL   Comprehensive metabolic panel   Result Value Ref Range    Glucose 160 (H) 65 - 99 mg/dL    Sodium 134 133 - 145 mmol/L    Potassium 4.1 3.4 - 5.1 mmol/L    Chloride 98 97 - 107 mmol/L    Bicarbonate 21 (L) 24 - 31 mmol/L    Urea Nitrogen 11 8 - 25 mg/dL    Creatinine 2.80 (H) 0.40 - 1.60 mg/dL    eGFR 18 (L) >60 mL/min/1.73m*2    Calcium 8.5 8.5 - 10.4 mg/dL    Albumin 2.5 (L) 3.5 - 5.0 g/dL    Alkaline Phosphatase 253 (H) 35 - 125 U/L    Total Protein 5.0 (L) 5.9 - 7.9 g/dL    AST 38 5 - 40 U/L    Bilirubin, Total 1.4 (H) 0.1 - 1.2 mg/dL    ALT 15 5 - 40 U/L    Anion Gap 15 <=19 mmol/L   Magnesium   Result Value Ref Range    Magnesium 1.70 1.60 - 3.10 mg/dL   Phosphorus   Result Value Ref Range    Phosphorus 2.6 2.5 - 4.5 mg/dL   POCT GLUCOSE   Result Value Ref Range    POCT  Glucose 143 (H) 74 - 99 mg/dL      Upper extremity venous duplex bilateral    Result Date: 1/25/2024           [facility] [id number] [place], [place] [id number]            Phone [phone]  Vascular Lab Report  [facility] US UPPER EXTREMITY VENOUS DUPLEX BILATERAL Patient Name:      BASIA VEGA      Reading Physician:  [id number] Mini Busby MD, RPVI Study Date:        1/25/2024           Ordering Provider:  [id number] ERI BERMAN MRN/PID:           [id number]            Fellow: Accession#:        [id number]        Technologist:       Dara Parr RVT Date of Birth/Age: 1950 / 73 years Technologist 2: Gender:            F                   Encounter#:         [id number] Admission Status:  Inpatient           Location Performed: [facility]  Diagnosis/ICD: Right arm swelling-M79.89; Left arm swelling-M79.89 CPT Codes:     49702 Peripheral venous duplex scan for DVT complete  CONCLUSIONS:  Right Upper Venous: No evidence of acute deep vein thrombus visualized in the right upper extremity. Internal jugular vein was visualized in segments due to IV lines and bandages. Left Upper Venous: No evidence of acute deep vein thrombus visualized in the left upper extremity. Subclavian stent is noted and appears patent. There is a known occluded dialysis access noted.  Additional Findings: Technically difficult exam due to IV lines, bandages, and patient's positioning.  Imaging & Doppler Findings:  Right               Compressible Thrombus        Flow Internal Jugular        Yes        None   Spontaneous/Phasic Subclavian              Yes        None Subclavian Proximal     Yes        None   Spontaneous/Phasic Subclavian Mid          Yes        None Subclavian Distal       Yes        None   Spontaneous/Phasic Axillary                Yes        None       Pulsatile Brachial                Yes        None Cephalic                Yes        None  Basilic                 Yes        None  Left                Compress Thrombus        Flow Internal Jugular      Yes      None       Pulsatile Subclavian            Yes      None Subclavian Proximal   Yes      None       Pulsatile Subclavian Mid        Yes      None       Pulsatile Subclavian Distal     Yes      None       Pulsatile Axillary              Yes      None   Spontaneous/Phasic Brachial              Yes      None Cephalic              Yes      None Basilic               Yes      None  [id number] Mini Busby MD, RPVI Electronically signed by [id number] Mini Busby MD, ALICE on 1/25/2024 at 4:06:13 PM  ** Final **     ECG 12 lead    Result Date: 1/24/2024  Sinus tachycardia  with 1st degree AV block Right bundle branch block Possible Lateral infarct , age undetermined Abnormal ECG Confirmed by Yesika Nj ([id number]) on 1/24/2024 6:54:45 AM    XR tibia fibula right 2 views    Result Date: 1/22/2024  Interpreted By:  Real Mendieta, STUDY: XR TIBIA FIBULA RIGHT 2 VIEWS; 1/22/2024 2:15 pm   INDICATION: Signs/Symptoms:evaluate source of infection, osteomyelitis;   COMPARISON: None available.   ACCESSION NUMBER(S): [id number]   ORDERING CLINICIAN: BAN GIFFORD   TECHNIQUE: Views: AP and Lateral of the right tibia and fibula   FINDINGS: RESULT: There is no evidence for fracture or dislocation. Tricompartmental degenerative changes of the right knee. The tibia and fibula appear intact without bony erosion or evidence for osteomyelitis. No other bony or soft tissue abnormality is identified. No subcutaneous gas is noted.       No evidence for acute osseous abnormality. No bony erosion to suggest osteomyelitis.   Signed by: Real Mendieta 1/22/2024 3:15 PM Dictation workstation:   [id number]    XR ankle right 3+ views    Result Date: 1/22/2024  Interpreted By:  Real Mendieta, STUDY: XR ANKLE RIGHT 3+ VIEWS; 1/22/2024 2:15 pm   INDICATION: Signs/Symptoms:Evuluate source of infection, osteomyelitis;   COMPARISON:  None available.   ACCESSION NUMBER(S): [id number]   ORDERING CLINICIAN: BAN GIFFORD   TECHNIQUE: Views: AP, Lateral, Oblique, right ankle   FINDINGS: RESULT: There is no evidence for fracture or dislocation. The ankle mortise is intact. Joint spaces appear adequately maintained. No bony erosion to suggest osteomyelitis. No bone lesion or soft tissue abnormality is identified. No subcutaneous gas is seen.       No evidence for acute osseous abnormality. No bony erosion to suggest osteomyelitis.   Signed by: Real Mendieta 1/22/2024 3:14 PM Dictation workstation:   [id number]    Transthoracic Echo (TTE) Complete    Result Date: 1/22/2024           [place] [address]            Phone [phone] TRANSTHORACIC ECHOCARDIOGRAM REPORT  Patient Name:      BASIA Gray Physician:   [id number] Fausto Rowe DO Study Date:        1/22/2024           Ordering Provider:   [id number] ERI BERMAN MRN/PID:           [id number]            Fellow: Accession#:        [id number]        Nurse: Date of Birth/Age: 1950 / 73 years Sonographer:         Tabatha Page RDCS Gender:            F                   Additional Staff: Height:            157.00 cm           Admit Date: Weight:            75.00 kg            Admission Status:    Inpatient - Routine BSA:               1.76 m2             Department Location: [facility] Blood Pressure: 88 /49 mmHg Study Type:    TRANSTHORACIC ECHO (TTE) COMPLETE Diagnosis/ICD: Chronic combined systolic (congestive) and diastolic (congestive)                heart failure (CHF)-I50.42; Sepsis, unspecified organism-A41.9 Indication:    heart failure,septic shock CPT Codes:     Echo Complete w Full Doppler-93645 Patient History: BMI:               Obese >30 Pertinent History: Sepsis, PVD, A-Fib, CVA, Cancer and HTN. breast                    prosthesis/ca, ESRD,anemia,septic  shock,heart failure. Study Detail: The following Echo studies were performed: 2D, M-Mode, Doppler and               color flow. Technically challenging study due to prosthesis,               prominent lung artifact and body habitus.  PHYSICIAN INTERPRETATION: Left Ventricle: Left ventricular systolic function is normal, with an estimated ejection fraction of 70%. There are no regional wall motion abnormalities. The left ventricular cavity size is normal. Left ventricular diastolic filling was indeterminate. Left Atrium: The left atrium is moderately dilated. Right Ventricle: The right ventricle is normal in size. There is normal right ventricular global systolic function. Right Atrium: The right atrium is normal in size. Aortic Valve: The aortic valve is trileaflet. There is no evidence of aortic valve regurgitation. The peak instantaneous gradient of the aortic valve is 2.8 mmHg. Mitral Valve: The mitral valve is normal in structure. There is no evidence of mitral valve regurgitation. Tricuspid Valve: The tricuspid valve is structurally normal. There is trace tricuspid regurgitation. Pulmonic Valve: The pulmonic valve is not well visualized. The pulmonic valve regurgitation was not well visualized. Pericardium: There is no pericardial effusion noted. Aorta: The aortic root is normal.  CONCLUSIONS:  1. Left ventricular systolic function is normal with a 70% estimated ejection fraction.  2. The left atrium is moderately dilated.  3. Left ventricular diastolic filling indeterminate. QUANTITATIVE DATA SUMMARY: 2D MEASUREMENTS:                          Normal Ranges: LAs:           4.50 cm   (2.7-4.0cm) IVSd:          0.61 cm   (0.6-1.1cm) LVPWd:         0.88 cm   (0.6-1.1cm) LVIDd:         3.66 cm   (3.9-5.9cm) LV Mass Index: 41.9 g/m2 LV SYSTOLIC FUNCTION BY 2D PLANIMETRY (MOD):                     Normal Ranges: EF-A4C View: 68.3 % (>=55%) LV DIASTOLIC FUNCTION:                     Normal Ranges: MV Peak E: 1.19 m/s  (0.7-1.2 m/s) MV Peak A: 0.90 m/s (0.42-0.7 m/s) E/A Ratio: 1.32     (1.0-2.2) MITRAL VALVE:                 Normal Ranges: MV DT: 192 msec (150-240msec) AORTIC VALVE:                         Normal Ranges: AoV Vmax:      0.84 m/s (<=1.7m/s) AoV Peak P.8 mmHg (<20mmHg) LVOT Max Shimon:  0.47 m/s (<=1.1m/s) LVOT Diameter: 1.90 cm  (1.8-2.4cm) AoV Area,Vmax: 1.57 cm2 (2.5-4.5cm2) TRICUSPID VALVE/RVSP:                   Normal Ranges: IVC Diam: 1.05 cm  [id number] Fausto Rowe DO Electronically signed on 2024 at 2:53:04 PM  ** Final **     XR foot left 3+ views    Result Date: 2024  Interpreted By:  Real Mendieta, STUDY: XR FOOT LEFT 1-2 VIEWS; 2024 4:15 pm   INDICATION: Signs/Symptoms:osteomyelitis. Pain   COMPARISON: 2023   ACCESSION NUMBER(S): [id number]   ORDERING CLINICIAN: BAN GIFFORD   TECHNIQUE: Views:  AP, Lat, Oblique, left foot   FINDINGS: RESULT: There is no evidence for fracture or dislocation. 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 Mild hammertoe deformities. Joint spaces appear adequately maintained. Soft tissue ulceration of the posterior aspect of the heel however no evidence for bony erosion to suggest osteomyelitis.       No evidence for acute fracture or acute bony erosion to suggest osteomyelitis. Chronic changes as described.   Signed by: Real Mendieta 2024 7:15 PM Dictation workstation:   [id number]    CT chest abdomen pelvis wo IV contrast    Result Date: 2024  Interpreted By:  Maria Garcia, STUDY: CT CHEST ABDOMEN PELVIS WO CONTRAST;  2024 11:42 pm   INDICATION: Mental status change. Elevated white blood cell count with infectious workup.   COMPARISON: 2023   ACCESSION NUMBER(S): [id number]   ORDERING CLINICIAN: BAN GIFFORD   TECHNIQUE: CT of the chest, abdomen and pelvis was performed with no oral or intravenous contrast administered. Sagittal and coronal reformations were completed by the technologist at the  acquisition scanner.   All CT examinations are performed with 1 or more of the following dose reduction techniques: Automated exposure control, adjustment of mA and/or kv according to patient's size, or use of iterative reconstruction techniques.   FINDINGS: Please note that the study is limited without intravenous contrast.   CHEST: Bilateral pleural effusions are present with right effusion moderately small in size and with the left effusion moderately small in size as well. There is shift of the heart and mediastinum to the left of midline due to atelectasis of the left upper lobe. There is consolidation throughout left lower lobe with air bronchograms noted. On the right, there is compressive atelectasis seen posteriorly in the right lower lobe.   There is mild reactive mediastinal adenopathy seen at this time with mediastinal lymph nodes increased in size since prior study. Several of these mediastinal nodes are at the upper limits of normal measuring 8 mm in short axis diameter.   No pericardial effusion is present. The cardiac size is normal with mitral annulus calcification.   There has been prior bilateral mastectomy with reconstruction of the left breast with implant placement. Surgical clips are visible within the left axilla. Stent grafts are visible within the left upper extremity and within the left innominate, subclavian as well as axillary veins.   A peripherally calcified 1.8 cm nodule arises from the lower pole of left thyroid lobe.   ABDOMEN:   LIVER: Unremarkable.   BILE DUCTS: No intrahepatic or extrahepatic biliary ductal dilatation is seen.   GALLBLADDER: Cholelithiasis is observed with some calcification of the gallbladder wall demonstrated as well. No gallbladder wall thickening or pericholecystic fluid is evident.   PANCREAS: Unremarkable   SPLEEN: Unremarkable   ADRENAL GLANDS: Unremarkable   KIDNEYS AND URETERS: Kidneys are small in size with extensive renal artery calcification  demonstrated.   PELVIS:   BLADDER: Amos catheter is present within the decompressed urinary bladder.   REPRODUCTIVE ORGANS: Calcification of the arcuate arteries within the uterus is seen. There is no uterine enlargement or adnexal mass.   BOWEL: A rectal balloon is seen. There is no abnormal distention of the intestinal tract.   VESSELS: There is atherosclerosis of the thoracoabdominal aorta and iliac arteries with calcification in the walls of the celiac, splenic, hepatic, and mesenteric arteries.   PERITONEUM/RETROPERITONEUM/LYMPH NODES: There is a minimal amount of ascites seen about the liver and spleen with mild presacral edema noted.   ABDOMINAL WALL: There is anasarca involving the soft tissues of the abdomen and pelvis. Postoperative change from ventral hernia repair is seen.   BONES: Bilateral sacroiliitis is seen. There is lumbar dextroscoliosis. Chronic rotator cuff tear is present bilaterally with osteoarthritis of both shoulders, right greater than left.       Chest 1.  Moderately small bilateral pleural effusions with left upper lobe atelectasis and compressive atelectasis seen posteriorly in right lower lobe. A left lower lobe pneumonia is identified. There is extensive airspace consolidation within left lower lobe with air bronchograms observed. Mild mediastinal reactive adenopathy is present as well.   Abdomen-Pelvis 1.  Cholelithiasis without evidence of cholecystitis. 2. Small amount of ascites with mild presacral edema and anasarca within the soft tissues of the abdominal wall. 3. Renal atrophy with extensive vascular calcifications.     MACRO: None   Signed by: Maria Garcia 1/21/2024 8:30 AM Dictation workstation:   [id number]    CT head wo IV contrast    Result Date: 1/21/2024  Interpreted By:  Maria Garcia, STUDY: CT HEAD WO IV CONTRAST 1/20/2024 11:42 pm   INDICATION: Signs/Symptoms:mental status changes   COMPARISON: 12/11/2023   ACCESSION NUMBER(S): [id number]   ORDERING CLINICIAN: BAN  GIFFORD   TECHNIQUE: Unenhanced axial images of the brain are completed.   All CT examinations are performed with 1 or more of the following dose reduction techniques: Automated exposure control, adjustment of mA and/or kv according to patient's size, or use of iterative reconstruction techniques.   FINDINGS: Helical unenhanced axial images of the brain demonstrate a mild to moderate degree of ventricular enlargement with proportionate widening of the sulci and sylvian fissures. There is no midline shift, mass effect, extra-axial fluid collection, or acute intracranial hemorrhage. There is diminished density seen in the periventricular white matter indicating chronic microvascular ischemic disease. There is calcified plaque seen within the distal vertebral and internal carotid arteries bilaterally. No calvarial abnormality is seen.       Atrophy and chronic microvascular ischemic disease without acute intracranial process.   Signed by: Maria Garcia 1/21/2024 8:09 AM Dictation workstation:   [id number]    XR chest 1 view    Result Date: 1/20/2024  Interpreted By:  Brendon Perez, STUDY: XR CHEST 1 VIEW; 1/20/2024 5:03 pm   INDICATION: CLINICAL INFORMATION: Signs/Symptoms:Insertion right IJ central line, also left thoracentesis.   COMPARISON: 01/19/2024 at 1338 hours   ACCESSION NUMBER(S): [id number]   ORDERING CLINICIAN: BOZENA ANTONIO   TECHNIQUE: Portable chest one view.   FINDINGS: The cardiac size is indeterminate in view of the AP projection. There is no change in the right-sided dual port central venous catheter. There is a new right internal jugular venous catheter present with the tip at approximately same level as the dual port central venous catheter, overlying the mid to lower right atrium. There is no evidence for pneumothorax. Patient has a history of left thoracentesis without evidence for pneumothorax on the left. There is bilateral basilar alveolar infiltrate and effusions. Left effusion is decreased  compared to the study from 1 day earlier.   Surgical clips are identified within left chest wall. Endovascular stent is identified in the distribution of the left subclavian artery.       1. Status post right-sided central venous catheter placement as described above with the tip overlying the mid to caudal aspect of the right atrium. There is no evidence for pneumothorax. 2. No evidence for left-sided pneumothorax after left-sided thoracentesis. Decrease in the left effusion. 3. Bilateral basilar infiltrates and effusions are present. Follow-up to assure complete clearing is suggested.   MACRO: none   Signed by: Brendon Perez 1/20/2024 5:11 PM Dictation workstation:   [id number]    XR chest 1 view    Result Date: 1/19/2024  Interpreted By:  Real Mendieta, STUDY: XR CHEST 1 VIEW; 1/19/2024 1:38 pm   INDICATION: Signs/Symptoms:dyspnea.   COMPARISON: 12/26/2023   ACCESSION NUMBER(S): [id number]   ORDERING CLINICIAN: EMELY GOLDSMITH   TECHNIQUE: 1 view of the chest was performed.   FINDINGS: There may be a minimal right pleural effusion. Slight prominence of the interstitium otherwise the right lung is clear. Improved appearance of the left lung with improved aeration of the right upper lobe. There is opacification of the left lower lobe possibly due to large pleural effusion which is similar to the prior study. No pneumothorax. Right-sided dual lumen central line catheter with tip at the proximal atrium. The cardiomediastinal silhouette is within normal limits. Left-sided subclavian stents are again noted.       Improved aeration and appearance of the left lung superiorly however there is a persistent large left pleural effusion and opacification of the left lower lobe.   Signed by: Real Mendieta 1/19/2024 1:44 PM Dictation workstation:   [id number]      I/O:    Intake/Output Summary (Last 24 hours) at 1/31/2024 1214  Last data filed at 1/31/2024 1200  Gross per 24 hour   Intake 350.42 ml   Output 350 ml    Net 0.42 ml         I have reviewed all medications, laboratory results, and imaging pertinent for today's encounter    Assessment/Plan:    I am currently managing this critically ill patient for the following problems:  #Septic Shock   #ESRD   #Chronic Hypotension   #Recurrent Pleural Effusions   #Vasculopathy   #Multiple Wounds with and with out infections  #Acute Metabolic Encephalopathy  #Metabolic lactic acidosis, resolved   #Hx of recurrent C. Diff infection  #Hx of Atrial fibrillation   #Hx of endocarditis   #Hx of T2 DM   #Hx of anemia of chronic disease     Neuro/Psych/Pain Ctrl/Sedation:  #Acute Metabolic Encephalopathy, resolving   -Pain Control: Tylenol for mild  -CAM Assessment every shift, neuro assessments q4   -Palliative on consult, plan for a family meeting today at 1630  -Continue home Lexapro and gabapentin  -Sleep/wake cycle hygiene  -Delirium precaution    Respiratory/ENT:  #Recurrent Pleural Effusions   -Currently on 2L NC, denies any shortness of breath  -Continuous pulse oximetry, maintain SpO2 >90%  -Thoracentesis 1/20 over 1L of hazy straw colored fluid, Lights criteria suggest Transudative    -Aggressive pulmonary/bronchial hygiene     Cardiovascular:  #Septic Shock  #Chronic Hypotension   #Vasculopathy   #Hx of Atrial fibrillation   #Hx of endocarditis    -Levophed titrate to maintain SBP > 70, plan to wean levo off when able  -Home midodrine 15 mg TID when mental status is appropriate   -Continue Fludrocortisone 0.1 TID  -Continue home eliquis   -Continuous cardiac monitoring    GI:  -Regular diet assess mental status before feeding, adding in Magic cup TID and Christian BID   -Flexacil for stools   -BR with senna/colace PRN   -Protonix daily   -Bilirubin stable at 1.4, no signs of jaundice present, daily monitoring     Renal/Volume Status (Intra & Extravascular):  #ESRD   #Metabolic lactic acidosis, resolved   -Nephrology following for dialysis needs, recommending M/W/F  -Elevated  Lactate 2/2 hypoperfusion -resolved  -Conservative fluid management  -Daily BMP, Replete electrolytes as indicated for ESRD      Endocrine  #Hx of T2 DM   -POCT glucose AC/HS  -SSI AC/HS    Infectious Disease:  #Multiple Wounds with and with out infections  #Hx of recurrent C. Diff infection  #Hx of endocarditis   -Doxycycline 100 mg every day for suppressive therapy   -Vancomycin PO for C. Diff prophylaxis   -ID following   -C. Diff and MRSA PCR negative   -Blood cultures from 1/19, negative   -Urine culture from 1/9, positive for proteus mirabilis     Heme/Onc:  #Hx of anemia of chronic disease   -monitor for s/s anemia  -transfuse for hgb <7  -daily CBC  -Duplex upper extremity US negative for UE DVT    Derm/MSK:  #Multiple Wounds with and with out infections  -Surgery consulted   -Wound care consulted for dressing other wounds   -Podiatry consulted  -XR Left ankle not suspicious for osteomyelitis   -XR Right ankle and tib/fib not suspicious for osteomyelitis   -Continue Uloric for gout prophylaxis   -padded pressure points  -ICU skin protocol    Ethics/Code Status:  Full Code     :  DVT Prophylaxis: home eliquis   GI Prophylaxis: PPI  Bowel Regimen: Senna  Diet: Regular   CVC: Right internal jugular removed 1/29   Chandni: Right radial   Amos: None   Restraints: n/a  Dispo: remain in the ICU     Critical Care Time:  75 minutes spent in preparing to see patient (I.e. review of medical records), evaluation of diagnostics (I.e. labs, imaging, etc.), documentation, discussing plan of care with patient/ family/ caregiver, and/ or coordination of care with multidisciplinary team. Time does not include completion of procedure time.        Vinod Livingston PA-C  Pulmonary and Critical Care Medicine   [facility]

## 2024-01-31 NOTE — NURSING NOTE
Wound Wednesday rounds completed. Patient has numerous skin impairments, with treatments being continued as ordered. Photos of all areas updated, see Avatar. All dressings replaced. Cream and nystatin applied to buttocks, jeaneth area and abdominal skin folds as ordered. FMS remains intact with intermittent stool leakage. Patient is on envision alternating pressure mattress, with bilateral offloading boots in place. Has nutritional supplements ordered, including Christian and mighty shake, however patient refuses to drink them despite much education and encouragement.  Turns Q 2 hours with wedges and pillows utilized to assist in positioning.

## 2024-01-31 NOTE — CARE PLAN
Problem: Pain  Goal: My pain/discomfort is manageable  Outcome: Progressing  Flowsheets (Taken 1/23/2024 1634 by Peri Martin, RN)  Resident's pain/discomfort is manageable:   Include resident/family/caregiver in decisions related to pain management   Offer non-pharmacological pain management interventions   Administer pain medication prior to activities that may trigger pain   Identify and avoid pain triggers     Problem: Safety  Goal: Patient will be injury free during hospitalization  Outcome: Progressing  Goal: I will remain free of falls  Outcome: Progressing  Flowsheets (Taken 1/21/2024 2323 by Judith Duncan RN)  Resident will remain free of falls:   Utilize fall response kit   Apply bed/chair alarms as appropriate   Assist with toileting as orderd   Maintain bed at position as ordered (chair height, low bed)   Consider camera monitoring   Assess and monitor medications that may increase fall risk   Consult with physical therapy as needed   Consider transfer to room close to nurses' station   Visual checks per facility policy   Accompany resident as ordered (ex. 1:1, stand-by assist, dayroom monitoring, 15 minute checks, line of sight)   Use gait belt for all transfers     Problem: Daily Care  Goal: Daily care needs are met  Outcome: Progressing  Flowsheets (Taken 1/23/2024 1634 by Peri Martin, RN)  Daily care needs are met:   Assess and monitor ability to perform self care and identify potential discharge needs   Assess skin integrity/risk for skin breakdown   Assist patient with activities of daily living as needed   Provide mouth care     Problem: Psychosocial Needs  Goal: Demonstrates ability to cope with hospitalization/illness  Outcome: Progressing  Flowsheets (Taken 1/23/2024 1634 by Peri Martin, RN)  Demonstrates ability to cope with hospitalization/illness:   Encourage verbalization of feelings/concerns/expectations   Provide low-stimulation environment as needed   Assist  resident to identify and practice own strengths and abilities   Encourage resident to set and complete small goals for self   Encourage participation in diversional activities   Include resident/family/caregiver in decisions related to psychosocial needs  Goal: Collaborate with me, my family, and caregiver to identify my specific goals  Outcome: Progressing     Problem: Discharge Barriers  Goal: My discharge needs are met  Outcome: Progressing  Flowsheets (Taken 1/26/2024 1306)  Resident's discharge needs are met:   Identify potential discharge barriers on admission and throughout stay   Involve resident/family/caregiver in discharge planning process     Problem: Fall/Injury  Goal: Not fall by end of shift  Outcome: Progressing  Goal: Be free from injury by end of the shift  Outcome: Progressing  Goal: Verbalize understanding of personal risk factors for fall in the hospital  Outcome: Progressing  Goal: Verbalize understanding of risk factor reduction measures to prevent injury from fall in the home  Outcome: Progressing  Goal: Pace activities to prevent fatigue by end of the shift  Outcome: Progressing     Problem: Skin  Goal: Decreased wound size/increased tissue granulation at next dressing change  Outcome: Progressing  Flowsheets (Taken 1/30/2024 0230 by Stephanie Navarro RN)  Decreased wound size/increased tissue granulation at next dressing change:   Promote sleep for wound healing   Utilize specialty bed per algorithm  Goal: Participates in plan/prevention/treatment measures  Outcome: Progressing  Flowsheets (Taken 1/31/2024 0222)  Participates in plan/prevention/treatment measures:   Discuss with provider PT/OT consult   Elevate heels  Goal: Prevent/manage excess moisture  Outcome: Progressing  Flowsheets (Taken 1/31/2024 3872)  Prevent/manage excess moisture:   Cleanse incontinence/protect with barrier cream   Moisturize dry skin   Follow provider orders for dressing changes   Monitor for/manage infection if  present  Goal: Prevent/minimize sheer/friction injuries  Outcome: Progressing  Flowsheets (Taken 1/31/2024 1748)  Prevent/minimize sheer/friction injuries:   Complete micro-shifts as needed if patient unable. Adjust patient position to relieve pressure points, not a full turn   HOB 30 degrees or less   Increase activity/out of bed for meals   Turn/reposition every 2 hours/use positioning/transfer devices   Use pull sheet   Utilize specialty bed per algorithm  Goal: Promote/optimize nutrition  Outcome: Progressing  Flowsheets (Taken 1/31/2024 1748)  Promote/optimize nutrition:   Assist with feeding   Discuss with provider if NPO > 2 days   Offer water/supplements/favorite foods   Consume > 50% meals/supplements   Monitor/record intake including meals   Reassess MST if dietician not consulted  Goal: Promote skin healing  Outcome: Progressing  Flowsheets (Taken 1/31/2024 1748)  Promote skin healing:   Assess skin/pad under line(s)/device(s)   Ensure correct size (line/device) and apply per  instructions   Protective dressings over bony prominences   Rotate device position/do not position patient on device   Turn/reposition every 2 hours/use positioning/transfer devices     Problem: Pain  Goal: Takes deep breaths with improved pain control throughout the shift  Outcome: Progressing  Goal: Turns in bed with improved pain control throughout the shift  Outcome: Progressing  Goal: Walks with improved pain control throughout the shift  Outcome: Progressing  Goal: Performs ADL's with improved pain control throughout shift  Outcome: Progressing  Goal: Participates in PT with improved pain control throughout the shift  Outcome: Progressing  Goal: Free from opioid side effects throughout the shift  Outcome: Progressing  Goal: Free from acute confusion related to pain meds throughout the shift  Outcome: Progressing     Problem: Respiratory  Goal: Clear secretions with interventions this shift  Outcome:  Progressing  Flowsheets (Taken 1/31/2024 1748)  Clear secretions with interventions this shift: Encourage/provide pulmonary hygiene/secretion clearance  Goal: Minimize anxiety/maximize coping throughout shift  Outcome: Progressing  Flowsheets (Taken 1/28/2024 1325)  Minimize anxiety/maximize coping throughout shift: Monitor pain/anxiety level  Goal: Minimal/no exertional discomfort or dyspnea this shift  Outcome: Progressing  Flowsheets (Taken 1/31/2024 1748)  Minimal/no exertional discomfort or dyspnea this shift: Positioning to promote ventilation/comfort  Goal: No signs of respiratory distress (eg. Use of accessory muscles. Peds grunting)  Outcome: Progressing  Goal: Patent airway maintained this shift  Outcome: Progressing  Goal: Tolerate pulmonary toileting this shift  Outcome: Progressing  Flowsheets (Taken 1/31/2024 1748)  Tolerate pulmonary toileting this shift: Positioning to promote ventilation/comfort  Goal: Verbalize decreased shortness of breath this shift  Outcome: Progressing  Goal: Wean oxygen to maintain O2 saturation per order/standard this shift  Outcome: Progressing  Flowsheets (Taken 1/31/2024 1748)  Wean oxygen to maintain O2 saturation per order/standard this shift: Encourage activity/mobility  Goal: Increase self care and/or family involvement in next 24 hours  Outcome: Progressing  Flowsheets (Taken 1/31/2024 1748)  Increase self care and/or family involvement in next 24 hours: Encourage activity/mobility     Problem: Diabetes  Goal: Maintain electrolyte levels within acceptable range throughout shift  Outcome: Progressing  Flowsheets (Taken 1/31/2024 1748)  Maintain electrolyte levels within acceptable range throughout shift: Med administration/monitoring of effect  Goal: Maintain glucose levels >70mg/dl to <250mg/dl throughout shift  Outcome: Progressing  Flowsheets (Taken 1/31/2024 1748)  Maintain glucose levels >70mg/dl to <250mg/dl throughout shift: Med administration/monitoring of  effect  Goal: No changes in neurological exam by end of shift  Outcome: Progressing  Flowsheets (Taken 1/31/2024 1745)  No changes in neurological exam by end of shift: Complete frequent neurological assessments  Goal: Learn about and adhere to nutrition recommendations by end of shift  Outcome: Progressing  Flowsheets (Taken 1/31/2024 1748)  Learn about and adhere to nutrition recommendations by end of shift: Ensure/encourage compliance with appropriate diet  Goal: Vital signs within normal range for age by end of shift  Outcome: Progressing  Flowsheets (Taken 1/31/2024 1748)  Vital signs within normal range for age by end of shift: Med administration/monitoring of effect  Goal: Increase self care and/or family involovement by end of shift  Outcome: Progressing  Goal: Receive DSME education by end of shift  Outcome: Progressing     Problem: Discharge Planning  Goal: Discharge to home or other facility with appropriate resources  Outcome: Progressing  Flowsheets (Taken 1/26/2024 1303)  Discharge to home or other facility with appropriate resources:   Identify barriers to discharge with patient and caregiver   Arrange for needed discharge resources and transportation as appropriate   Identify discharge learning needs (meds, wound care, etc)   Arrange for interpreters to assist at discharge as needed   Refer to discharge planning if patient needs post-hospital services based on physician order or complex needs related to functional status, cognitive ability or social support system     Problem: Chronic Conditions and Co-morbidities  Goal: Patient's chronic conditions and co-morbidity symptoms are monitored and maintained or improved  Outcome: Progressing  Flowsheets (Taken 1/26/2024 3020)  Care Plan - Patient's Chronic Conditions and Co-Morbidity Symptoms are Monitored and Maintained or Improved:   Monitor and assess patient's chronic conditions and comorbid symptoms for stability, deterioration, or improvement    Collaborate with multidisciplinary team to address chronic and comorbid conditions and prevent exacerbation or deterioration   Update acute care plan with appropriate goals if chronic or comorbid symptoms are exacerbated and prevent overall improvement and discharge   The patient's goals for the shift include unable to state    The clinical goals for the shift include Patient will have decreasing pressor requirements    Over the shift, the patient did not make progress toward the following goals. Barriers to progression include persistent pressor needs .Recommendations to address these barriers include hospice consult, education and emotional support

## 2024-01-31 NOTE — PROGRESS NOTES
"Nutrition Follow up Note    Nutrition Assessment      Meeting today to discuss goals of care with patient/family. PO intake is minimal. Family bringing in outside food to encourage intake. Patient refuses all nutritional supplements, asks that they be discontinued at this time.    Nutrition History:  Food and Nutrient History: Patient is not drinking any supplements, ask they be discontinued  Energy Intake: Poor < 50 %  Food Allergies/Intolerances:  None  GI Symptoms: None  Oral Problems: Oral aversion    Anthropometrics:  Ht: 157.5 cm (5' 2\"), Wt: 103 kg (226 lb 3.1 oz), BMI: 41.36  IBW/kg (Dietitian Calculated): 50 kg  Percent of IBW: 151.6 %  Adjusted Body Weight (kg): 56.36 kg    Weight Change:  Daily Weight  01/31/24 : 103 kg (226 lb 3.1 oz)  01/05/24 : 72.8 kg (160 lb 7.9 oz)  11/19/23 : 72.6 kg (160 lb)  01/19/23 : 80.2 kg (176 lb 12.9 oz)  11/07/22 : 86.2 kg (190 lb)  07/21/22 : 80.2 kg (176 lb 12.9 oz)  05/26/22 : 77.1 kg (170 lb)  04/04/22 : 86.2 kg (190 lb)  06/29/21 : 87.9 kg (193 lb 12.6 oz)  05/11/21 : 81.6 kg (180 lb)     Nutrition Focused Physical Exam Findings:      Edema  Edema: +2 mild  Edema Location: BLE, BUE    Nutrition Significant Labs:  Lab Results   Component Value Date    WBC 15.9 (H) 01/31/2024    HGB 9.7 (L) 01/31/2024    HCT 30.4 (L) 01/31/2024     01/31/2024    CHOL 104 (L) 07/01/2023    TRIG 155 (H) 07/01/2023    HDL 30 (L) 07/01/2023    ALT 15 01/31/2024    AST 38 01/31/2024     01/31/2024    K 4.1 01/31/2024    CL 98 01/31/2024    CREATININE 2.80 (H) 01/31/2024    BUN 11 01/31/2024    CO2 21 (L) 01/31/2024    TSH 6.10 (H) 01/24/2024    INR 1.3 (H) 01/19/2024    HGBA1C 8.8 (H) 06/25/2023    ALBUR 1,315 (H) 02/24/2019       Current Facility-Administered Medications:     acetaminophen (Tylenol) tablet 1,000 mg, 1,000 mg, oral, TID, Jazmin Sanchez, APRN-CNP    apixaban (Eliquis) tablet 2.5 mg, 2.5 mg, oral, BID, Judd Tavera DO, 2.5 mg at 01/31/24 0836    " benzocaine-menthoL (Dermoplast) topical spray, , Topical, 4x daily PRN, MADHURI Diaz-CNP    dextrose 10 % in water (D10W) infusion, 0.3 g/kg/hr, intravenous, Once PRN, Judd Tavera DO    dextrose 50 % injection 25 g, 25 g, intravenous, q15 min PRN, Judd Tavera DO    doxycycline (Vibramycin) capsule 100 mg, 100 mg, oral, Daily, Judd Barreto MD, 100 mg at 01/31/24 1256    escitalopram (Lexapro) tablet 10 mg, 10 mg, oral, Nightly, Judd Tavera DO, 10 mg at 01/30/24 2152    fludrocortisone (Florinef) tablet 0.1 mg, 0.1 mg, oral, TID, Vinod Livingston PA-C, 0.1 mg at 01/31/24 0836    gabapentin (Neurontin) capsule 100 mg, 100 mg, oral, TID, MADHURI Diaz-CNP    glucagon (Glucagen) injection 1 mg, 1 mg, intramuscular, q15 min PRN, Judd Tavera DO    heparin 1,000 unit/mL injection 2,000 Units, 2,000 Units, intra-catheter, After Dialysis, Saeed Mondragon MD, 1,600 Units at 01/29/24 1613    heparin 1,000 unit/mL injection 2,000 Units, 2,000 Units, intra-catheter, After Dialysis, Saeed Mondragon MD, 1,600 Units at 01/29/24 1612    insulin lispro (HumaLOG) injection 0-10 Units, 0-10 Units, subcutaneous, TID with meals, Judd Taevra DO, 2 Units at 01/30/24 1657    ipratropium-albuteroL (Duo-Neb) 0.5-2.5 mg/3 mL nebulizer solution 3 mL, 3 mL, nebulization, q6h while awake, Judd Barreto MD, 3 mL at 01/31/24 1059    midodrine (Proamatine) tablet 15 mg, 15 mg, oral, TID with meals, Judd Tavera DO, 15 mg at 01/31/24 1256    norepinephrine (Levophed) 8 mg in dextrose 5% 250 mL (0.032 mg/mL) infusion (premix), 0.01-3 mcg/kg/min, intravenous, Continuous, Yusuf De La Garza PA-C, Last Rate: 13.93 mL/hr at 01/31/24 1300, 0.1 mcg/kg/min at 01/31/24 1300    nystatin (Mycostatin) 100,000 unit/gram powder, , Topical, BID, Judd Tavera DO, Given at 01/31/24 0837    nystatin (Mycostatin) cream, , Topical, BID, MADHURI Blair-GRACE, Given at  01/31/24 0837    oxygen (O2) therapy, , inhalation, Continuous PRN - O2/gases, Judd Tavera DO, 4 L/min at 01/23/24 1600    pantoprazole (ProtoNix) EC tablet 40 mg, 40 mg, oral, Daily, 40 mg at 01/31/24 0836 **OR** pantoprazole (ProtoNix) injection 40 mg, 40 mg, intravenous, Daily, Yusuf De La Garza PA-C, 40 mg at 01/21/24 0824    sennosides-docusate sodium (Judy-Colace) 8.6-50 mg per tablet 1 tablet, 1 tablet, oral, Nightly PRN, Yusuf De La Garza PA-C    sodium chloride 3 % nebulizer solution 3 mL, 3 mL, nebulization, TID, Charly Grossman MD, 3 mL at 01/31/24 1100    vancomycin (Vancocin) capsule 125 mg, 125 mg, oral, Daily, Katlyn Alexander, APRN-CNP, 125 mg at 01/31/24 0836    zinc oxide 20 % ointment 1 Application, 1 Application, Topical, BID, Nickolas Gallego APRN-CNP, 1 Application at 01/31/24 0837    Dietary Orders (From admission, onward)       Start     Ordered    01/25/24 1700  Adult diet Regular; Thin 0  Diet effective now        Question Answer Comment   Diet type Regular    Fluid consistency Thin 0        01/25/24 1659                   Estimated Needs:   Estimated Energy Needs  Total Energy Estimated Needs (kCal):  (4377-0828)  Total Estimated Energy Need per Day (kCal/kg):  (30-35)  Method for Estimating Needs: ABW    Estimated Protein Needs  Total Protein Estimated Needs (g):  (68-85)  Total Protein Estimated Needs (g/kg):  (1.2-1.5)  Method for Estimating Needs: ABW    Estimated Fluid Needs  Total Fluid Estimated Needs (mL):  (UO + 500 mL)  Method for Estimating Needs: HD and multiple wounds      Nutrition Diagnosis   Nutrition Diagnosis:  Malnutrition Diagnosis  Diagnosis Status: Ongoing    Nutrition Diagnosis  Patient has Nutrition Diagnosis: Yes  Diagnosis Status (1): Ongoing  Nutrition Diagnosis 1: Increased nutrient needs  Related to (1): Increased demand for nutrient  As Evidenced by (1): HD     Nutrition Interventions/Recommendations   Nutrition Interventions and Recommendations:    Nutrition  Prescription:  Individualized Nutrition Prescription Provided for : 9432-7053 kcals, 68-85 gm protein via Renal diet    Nutrition Interventions:   Food and/or Nutrient Delivery Interventions  Interventions: Meals and snacks  Meals and Snacks: General healthful diet  Goal: provide as tolerated    Education Documentation  No documentation found.         Nutrition Monitoring and Evaluation   Monitoring/Evaluation:   Food/Nutrient Related History Monitoring  Monitoring and Evaluation Plan: Energy intake  Energy Intake: Estimated energy intake  Criteria: Pt to consume >/= 50% of estimated needs       Time Spent/Follow-up:   Follow Up  Time Spent (min): 20 minutes  Last Date of Nutrition Visit: 01/31/24  Nutrition Follow-Up Needed?: 5-7 days  Follow up Comment: 2/5/24

## 2024-02-01 NOTE — CARE PLAN
Problem: Pain  Goal: My pain/discomfort is manageable  Outcome: Progressing  Flowsheets (Taken 2/1/2024 1837)  Resident's pain/discomfort is manageable:   Include resident/family/caregiver in decisions related to pain management   Offer non-pharmacological pain management interventions   Administer pain medication prior to activities that may trigger pain   Identify and avoid pain triggers     Problem: Safety  Goal: Patient will be injury free during hospitalization  Outcome: Progressing  Goal: I will remain free of falls  Outcome: Progressing  Flowsheets (Taken 2/1/2024 1837)  Resident will remain free of falls:   Use gait belt for all transfers   Apply bed/chair alarms as appropriate   Accompany resident as ordered (ex. 1:1, stand-by assist, dayroom monitoring, 15 minute checks, line of sight)   Maintain bed at position as ordered (chair height, low bed)     Problem: Daily Care  Goal: Daily care needs are met  Outcome: Progressing  Flowsheets (Taken 2/1/2024 1837)  Daily care needs are met:   Assess and monitor ability to perform self care and identify potential discharge needs   Assess skin integrity/risk for skin breakdown   Assist patient with activities of daily living as needed   Encourage independent activity per ability   Include patient/family/caregiver in decisions related to daily care     Problem: Psychosocial Needs  Goal: Demonstrates ability to cope with hospitalization/illness  Outcome: Progressing  Flowsheets (Taken 2/1/2024 1837)  Demonstrates ability to cope with hospitalization/illness:   Encourage verbalization of feelings/concerns/expectations   Provide low-stimulation environment as needed   Assist resident to identify and practice own strengths and abilities   Encourage resident to set and complete small goals for self   Encourage participation in diversional activities   Reinforce positive adaptation of new coping behaviors   Include resident/family/caregiver in decisions related to  psychosocial needs  Goal: Collaborate with me, my family, and caregiver to identify my specific goals  Outcome: Progressing     Problem: Discharge Barriers  Goal: My discharge needs are met  Outcome: Progressing  Flowsheets (Taken 1/26/2024 1306 by Elzbieta Alejandro RN)  Resident's discharge needs are met:   Identify potential discharge barriers on admission and throughout stay   Involve resident/family/caregiver in discharge planning process     Problem: Fall/Injury  Goal: Not fall by end of shift  Outcome: Progressing  Goal: Be free from injury by end of the shift  Outcome: Progressing  Goal: Verbalize understanding of personal risk factors for fall in the hospital  Outcome: Progressing  Goal: Verbalize understanding of risk factor reduction measures to prevent injury from fall in the home  Outcome: Progressing  Goal: Pace activities to prevent fatigue by end of the shift  Outcome: Progressing     Problem: Skin  Goal: Decreased wound size/increased tissue granulation at next dressing change  Outcome: Progressing  Flowsheets (Taken 2/1/2024 1837)  Decreased wound size/increased tissue granulation at next dressing change:   Promote sleep for wound healing   Utilize specialty bed per algorithm   Protective dressings over bony prominences  Goal: Participates in plan/prevention/treatment measures  Outcome: Progressing  Flowsheets (Taken 2/1/2024 1837)  Participates in plan/prevention/treatment measures:   Discuss with provider PT/OT consult   Increase activity/out of bed for meals   Elevate heels  Goal: Prevent/manage excess moisture  Outcome: Progressing  Flowsheets (Taken 2/1/2024 1837)  Prevent/manage excess moisture:   Cleanse incontinence/protect with barrier cream   Moisturize dry skin   Follow provider orders for dressing changes  Goal: Prevent/minimize sheer/friction injuries  Outcome: Progressing  Flowsheets (Taken 2/1/2024 1837)  Prevent/minimize sheer/friction injuries:   Complete micro-shifts as needed if  patient unable. Adjust patient position to relieve pressure points, not a full turn   HOB 30 degrees or less   Increase activity/out of bed for meals   Turn/reposition every 2 hours/use positioning/transfer devices  Goal: Promote/optimize nutrition  Outcome: Progressing  Flowsheets (Taken 2/1/2024 1837)  Promote/optimize nutrition:   Consume > 50% meals/supplements   Offer water/supplements/favorite foods   Monitor/record intake including meals  Goal: Promote skin healing  Outcome: Progressing  Flowsheets (Taken 2/1/2024 1837)  Promote skin healing:   Assess skin/pad under line(s)/device(s)   Ensure correct size (line/device) and apply per  instructions   Protective dressings over bony prominences   Rotate device position/do not position patient on device   Turn/reposition every 2 hours/use positioning/transfer devices     Problem: Pain  Goal: Takes deep breaths with improved pain control throughout the shift  Outcome: Progressing  Goal: Turns in bed with improved pain control throughout the shift  Outcome: Progressing  Goal: Performs ADL's with improved pain control throughout shift  Outcome: Progressing  Goal: Free from acute confusion related to pain meds throughout the shift  Outcome: Progressing     Problem: Respiratory  Goal: Clear secretions with interventions this shift  Outcome: Progressing  Flowsheets (Taken 2/1/2024 1837)  Clear secretions with interventions this shift:   Encourage/provide pulmonary hygiene/secretion clearance   Med administration/monitoring of effect  Goal: Minimize anxiety/maximize coping throughout shift  Outcome: Progressing  Flowsheets (Taken 2/1/2024 1837)  Minimize anxiety/maximize coping throughout shift:   Med administration/monitoring of effect   Monitor pain/anxiety level  Goal: Minimal/no exertional discomfort or dyspnea this shift  Outcome: Progressing  Goal: No signs of respiratory distress (eg. Use of accessory muscles. Peds grunting)  Outcome:  Progressing  Goal: Patent airway maintained this shift  Outcome: Progressing  Goal: Tolerate pulmonary toileting this shift  Outcome: Progressing  Flowsheets (Taken 2/1/2024 1837)  Tolerate pulmonary toileting this shift: Positioning to promote ventilation/comfort  Goal: Verbalize decreased shortness of breath this shift  Outcome: Progressing  Flowsheets (Taken 2/1/2024 1837)  Verbalize decreased shortness of breath this shift: Encourage/provide pulmonary hygiene/secretion clearance  Goal: Wean oxygen to maintain O2 saturation per order/standard this shift  Outcome: Progressing  Flowsheets (Taken 2/1/2024 1837)  Wean oxygen to maintain O2 saturation per order/standard this shift: Encourage activity/mobility  Goal: Increase self care and/or family involvement in next 24 hours  Outcome: Progressing  Flowsheets (Taken 2/1/2024 1837)  Increase self care and/or family involvement in next 24 hours: Encourage activity/mobility     Problem: Diabetes  Goal: Maintain electrolyte levels within acceptable range throughout shift  Outcome: Progressing  Flowsheets (Taken 2/1/2024 1837)  Maintain electrolyte levels within acceptable range throughout shift:   Monitor urine output   Med administration/monitoring of effect  Goal: Maintain glucose levels >70mg/dl to <250mg/dl throughout shift  Outcome: Progressing  Flowsheets (Taken 2/1/2024 1837)  Maintain glucose levels >70mg/dl to <250mg/dl throughout shift: Med administration/monitoring of effect  Goal: No changes in neurological exam by end of shift  Outcome: Progressing  Flowsheets (Taken 2/1/2024 1837)  No changes in neurological exam by end of shift: Complete frequent neurological assessments  Goal: Learn about and adhere to nutrition recommendations by end of shift  Outcome: Progressing  Goal: Vital signs within normal range for age by end of shift  Outcome: Progressing  Flowsheets (Taken 2/1/2024 1837)  Vital signs within normal range for age by end of shift: Med  "administration/monitoring of effect  Goal: Increase self care and/or family involovement by end of shift  Outcome: Progressing     Problem: Discharge Planning  Goal: Discharge to home or other facility with appropriate resources  Outcome: Progressing  Flowsheets (Taken 2/1/2024 1837)  Discharge to home or other facility with appropriate resources:   Identify barriers to discharge with patient and caregiver   Arrange for needed discharge resources and transportation as appropriate   Identify discharge learning needs (meds, wound care, etc)   Arrange for interpreters to assist at discharge as needed   Refer to discharge planning if patient needs post-hospital services based on physician order or complex needs related to functional status, cognitive ability or social support system     Problem: Chronic Conditions and Co-morbidities  Goal: Patient's chronic conditions and co-morbidity symptoms are monitored and maintained or improved  Outcome: Progressing  Flowsheets (Taken 2/1/2024 1837)  Care Plan - Patient's Chronic Conditions and Co-Morbidity Symptoms are Monitored and Maintained or Improved:   Monitor and assess patient's chronic conditions and comorbid symptoms for stability, deterioration, or improvement   Collaborate with multidisciplinary team to address chronic and comorbid conditions and prevent exacerbation or deterioration   Update acute care plan with appropriate goals if chronic or comorbid symptoms are exacerbated and prevent overall improvement and discharge   The patient's goals for the shift include \"talk about discharge\"    The clinical goals for the shift include stable blood pressure    Patient remains on levophed, titrating as tolerated. Discussed discharge planning with care coordination. Patient denied any additional needs.    "

## 2024-02-01 NOTE — PROGRESS NOTES
CONSULT PROGRESS NOTES    SERVICE DATE: 2/1/2024   SERVICE TIME: 3:49 PM    CONSULTING SERVICE: Nephrology    ASSESSMENT AND PLAN   1.  End-stage renal disease  2.  Hypotension  3.  Hyponatremia  4.  Hypokalemia  5.  Anemia of chronic kidney disease     Dialysis today with [facility] again with low temperature and attempting gentle fluid removal.    She is extremely malnourished with low albumin, low prealbumin, no major muscle mass.  She has been hospitalized for the majority of the past 2 months.  Hyponatremia from volume overload in this patient with oliguria.  For her hypokalemia, we are using a 4K bath.  Hemoglobin is close to goal.  Holding albumin.  She did not really seem to respond to the fludrocortisone, seems dependent on norepinephrine, even at a lower dose.  She is still considering LTAC for placement.  She is meeting with hospice, which is appropriate at least for her options.  I discussed this case with palliative care.  Presumably will plan on hemodialysis tomorrow pending further goals of care.  Case discussed with Dr. Barreto.  I do not think she benefits from CRRT.    SUBJECTIVE  INTERVAL HPI: She is again tearful about her overall station.  She remains dependent on norepinephrine infusion.  No major change in clinical status otherwise, no significant pain, no dyspnea.    MEDICATIONS:  acetaminophen, 1,000 mg, oral, TID  apixaban, 2.5 mg, oral, BID  doxycylcine, 100 mg, oral, Daily  escitalopram, 10 mg, oral, Nightly  fludrocortisone, 0.1 mg, oral, TID  gabapentin, 100 mg, oral, TID  heparin, 2,000 Units, intra-catheter, After Dialysis  heparin, 2,000 Units, intra-catheter, After Dialysis  insulin lispro, 0-10 Units, subcutaneous, TID with meals  ipratropium-albuteroL, 3 mL, nebulization, q6h while awake  midodrine, 15 mg, oral, TID with meals  nystatin, , Topical, BID  nystatin, , Topical, BID  pantoprazole, 40 mg, oral, Daily   Or  pantoprazole, 40 mg, intravenous, Daily  sodium chloride, 3 mL,  nebulization, TID  vancomycin, 125 mg, oral, BID  zinc oxide, 1 Application, Topical, BID       norepinephrine, 0.01-3 mcg/kg/min, Last Rate: 0.09 mcg/kg/min (02/01/24 1525)       PRN medications: benzocaine-menthoL, dextrose 10 % in water (D10W), dextrose, glucagon, oxygen, sennosides-docusate sodium     OBJECTIVE  PHYSICAL EXAM:   Heart Rate:  []   Temp:  [36.9 °C (98.4 °F)]   Resp:  [13-29]   Weight:  [103 kg (226 lb 13.7 oz)]   SpO2:  [91 %-98 %]   Body mass index is 41.49 kg/m².  Chronically ill-appearing elderly white woman  Pale skin  Right-sided hand swelling  Left-sided finger amputation  Internal jugular tunneled hemodialysis catheter in place  There is no significant pretibial edema on both lower extremities  Soft abdomen  No Amos  No obvious joint deformities  Moist mucosa  Hearing seems to be intact  Phonation intact    DATA:   Labs:  Results for orders placed or performed during the hospital encounter of 01/19/24 (from the past 96 hour(s))   POCT GLUCOSE   Result Value Ref Range    POCT Glucose 119 (H) 74 - 99 mg/dL   POCT GLUCOSE   Result Value Ref Range    POCT Glucose 132 (H) 74 - 99 mg/dL   POCT GLUCOSE   Result Value Ref Range    POCT Glucose 157 (H) 74 - 99 mg/dL   Phosphorus   Result Value Ref Range    Phosphorus 3.1 2.5 - 4.5 mg/dL   CBC   Result Value Ref Range    WBC 16.2 (H) 4.4 - 11.3 x10*3/uL    nRBC 0.0 0.0 - 0.0 /100 WBCs    RBC 3.20 (L) 4.00 - 5.20 x10*6/uL    Hemoglobin 9.4 (L) 12.0 - 16.0 g/dL    Hematocrit 29.7 (L) 36.0 - 46.0 %    MCV 93 80 - 100 fL    MCH 29.4 26.0 - 34.0 pg    MCHC 31.6 (L) 32.0 - 36.0 g/dL    RDW 24.7 (H) 11.5 - 14.5 %    Platelets 141 (L) 150 - 450 x10*3/uL   Comprehensive metabolic panel   Result Value Ref Range    Glucose 150 (H) 65 - 99 mg/dL    Sodium 131 (L) 133 - 145 mmol/L    Potassium 3.7 3.4 - 5.1 mmol/L    Chloride 97 97 - 107 mmol/L    Bicarbonate 19 (L) 24 - 31 mmol/L    Urea Nitrogen 12 8 - 25 mg/dL    Creatinine 3.20 (H) 0.40 - 1.60 mg/dL     eGFR 15 (L) >60 mL/min/1.73m*2    Calcium 8.6 8.5 - 10.4 mg/dL    Albumin 2.6 (L) 3.5 - 5.0 g/dL    Alkaline Phosphatase 183 (H) 35 - 125 U/L    Total Protein 4.8 (L) 5.9 - 7.9 g/dL    AST 34 5 - 40 U/L    Bilirubin, Total 1.7 (H) 0.1 - 1.2 mg/dL    ALT 14 5 - 40 U/L    Anion Gap 15 <=19 mmol/L   Magnesium   Result Value Ref Range    Magnesium 1.70 1.60 - 3.10 mg/dL   POCT GLUCOSE   Result Value Ref Range    POCT Glucose 143 (H) 74 - 99 mg/dL   POCT GLUCOSE   Result Value Ref Range    POCT Glucose 126 (H) 74 - 99 mg/dL   POCT GLUCOSE   Result Value Ref Range    POCT Glucose 141 (H) 74 - 99 mg/dL   CBC   Result Value Ref Range    WBC 15.6 (H) 4.4 - 11.3 x10*3/uL    nRBC 0.0 0.0 - 0.0 /100 WBCs    RBC 3.35 (L) 4.00 - 5.20 x10*6/uL    Hemoglobin 9.9 (L) 12.0 - 16.0 g/dL    Hematocrit 30.6 (L) 36.0 - 46.0 %    MCV 91 80 - 100 fL    MCH 29.6 26.0 - 34.0 pg    MCHC 32.4 32.0 - 36.0 g/dL    RDW 25.3 (H) 11.5 - 14.5 %    Platelets 169 150 - 450 x10*3/uL   Comprehensive metabolic panel   Result Value Ref Range    Glucose 210 (H) 65 - 99 mg/dL    Sodium 133 133 - 145 mmol/L    Potassium 4.2 3.4 - 5.1 mmol/L    Chloride 98 97 - 107 mmol/L    Bicarbonate 24 24 - 31 mmol/L    Urea Nitrogen 7 (L) 8 - 25 mg/dL    Creatinine 2.20 (H) 0.40 - 1.60 mg/dL    eGFR 23 (L) >60 mL/min/1.73m*2    Calcium 8.3 (L) 8.5 - 10.4 mg/dL    Albumin 2.6 (L) 3.5 - 5.0 g/dL    Alkaline Phosphatase 255 (H) 35 - 125 U/L    Total Protein 5.1 (L) 5.9 - 7.9 g/dL    AST 35 5 - 40 U/L    Bilirubin, Total 1.4 (H) 0.1 - 1.2 mg/dL    ALT 15 5 - 40 U/L    Anion Gap 11 <=19 mmol/L   Magnesium   Result Value Ref Range    Magnesium 1.70 1.60 - 3.10 mg/dL   Phosphorus   Result Value Ref Range    Phosphorus 2.0 (L) 2.5 - 4.5 mg/dL   POCT GLUCOSE   Result Value Ref Range    POCT Glucose 166 (H) 74 - 99 mg/dL   POCT GLUCOSE   Result Value Ref Range    POCT Glucose 167 (H) 74 - 99 mg/dL   POCT GLUCOSE   Result Value Ref Range    POCT Glucose 183 (H) 74 - 99 mg/dL   POCT  GLUCOSE   Result Value Ref Range    POCT Glucose 112 (H) 74 - 99 mg/dL   CBC   Result Value Ref Range    WBC 15.9 (H) 4.4 - 11.3 x10*3/uL    nRBC 0.0 0.0 - 0.0 /100 WBCs    RBC 3.30 (L) 4.00 - 5.20 x10*6/uL    Hemoglobin 9.7 (L) 12.0 - 16.0 g/dL    Hematocrit 30.4 (L) 36.0 - 46.0 %    MCV 92 80 - 100 fL    MCH 29.4 26.0 - 34.0 pg    MCHC 31.9 (L) 32.0 - 36.0 g/dL    RDW 25.0 (H) 11.5 - 14.5 %    Platelets 200 150 - 450 x10*3/uL   Comprehensive metabolic panel   Result Value Ref Range    Glucose 160 (H) 65 - 99 mg/dL    Sodium 134 133 - 145 mmol/L    Potassium 4.1 3.4 - 5.1 mmol/L    Chloride 98 97 - 107 mmol/L    Bicarbonate 21 (L) 24 - 31 mmol/L    Urea Nitrogen 11 8 - 25 mg/dL    Creatinine 2.80 (H) 0.40 - 1.60 mg/dL    eGFR 18 (L) >60 mL/min/1.73m*2    Calcium 8.5 8.5 - 10.4 mg/dL    Albumin 2.5 (L) 3.5 - 5.0 g/dL    Alkaline Phosphatase 253 (H) 35 - 125 U/L    Total Protein 5.0 (L) 5.9 - 7.9 g/dL    AST 38 5 - 40 U/L    Bilirubin, Total 1.4 (H) 0.1 - 1.2 mg/dL    ALT 15 5 - 40 U/L    Anion Gap 15 <=19 mmol/L   Magnesium   Result Value Ref Range    Magnesium 1.70 1.60 - 3.10 mg/dL   Phosphorus   Result Value Ref Range    Phosphorus 2.6 2.5 - 4.5 mg/dL   POCT GLUCOSE   Result Value Ref Range    POCT Glucose 143 (H) 74 - 99 mg/dL   POCT GLUCOSE   Result Value Ref Range    POCT Glucose 144 (H) 74 - 99 mg/dL   POCT GLUCOSE   Result Value Ref Range    POCT Glucose 187 (H) 74 - 99 mg/dL   POCT GLUCOSE   Result Value Ref Range    POCT Glucose 250 (H) 74 - 99 mg/dL   CBC   Result Value Ref Range    WBC 15.5 (H) 4.4 - 11.3 x10*3/uL    nRBC 0.0 0.0 - 0.0 /100 WBCs    RBC 3.13 (L) 4.00 - 5.20 x10*6/uL    Hemoglobin 9.2 (L) 12.0 - 16.0 g/dL    Hematocrit 29.6 (L) 36.0 - 46.0 %    MCV 95 80 - 100 fL    MCH 29.4 26.0 - 34.0 pg    MCHC 31.1 (L) 32.0 - 36.0 g/dL    RDW 25.0 (H) 11.5 - 14.5 %    Platelets 204 150 - 450 x10*3/uL   Comprehensive metabolic panel   Result Value Ref Range    Glucose 193 (H) 65 - 99 mg/dL    Sodium 131  (L) 133 - 145 mmol/L    Potassium 4.2 3.4 - 5.1 mmol/L    Chloride 99 97 - 107 mmol/L    Bicarbonate 21 (L) 24 - 31 mmol/L    Urea Nitrogen 10 8 - 25 mg/dL    Creatinine 2.40 (H) 0.40 - 1.60 mg/dL    eGFR 21 (L) >60 mL/min/1.73m*2    Calcium 8.3 (L) 8.5 - 10.4 mg/dL    Albumin 2.3 (L) 3.5 - 5.0 g/dL    Alkaline Phosphatase 239 (H) 35 - 125 U/L    Total Protein 4.9 (L) 5.9 - 7.9 g/dL    AST 32 5 - 40 U/L    Bilirubin, Total 1.1 0.1 - 1.2 mg/dL    ALT 13 5 - 40 U/L    Anion Gap 11 <=19 mmol/L   Magnesium   Result Value Ref Range    Magnesium 1.60 1.60 - 3.10 mg/dL   Phosphorus   Result Value Ref Range    Phosphorus 2.5 2.5 - 4.5 mg/dL   POCT GLUCOSE   Result Value Ref Range    POCT Glucose 191 (H) 74 - 99 mg/dL   POCT GLUCOSE   Result Value Ref Range    POCT Glucose 209 (H) 74 - 99 mg/dL         SIGNATURE: Saeed Mondragon MD PATIENT NAME: Ayanna Gross   DATE: February 1, 2024 MRN: [id number]   TIME: 3:49 PM PAGER: [phone]

## 2024-02-01 NOTE — PROGRESS NOTES
Ayanna Gross is a 73 y.o. female on day 13 of admission presenting with Septic shock (CMS/HCC).    Subjective   Interval History:   Afebrile, no chills  Hospice meeting planned for later today  Discussed with nursing  Soft stool reported        Objective   Range of Vitals (last 24 hours)  Heart Rate:  []   Temp:  [36.9 °C (98.4 °F)]   Resp:  [13-29]   Weight:  [101 kg (221 lb 12.5 oz)-103 kg (226 lb 13.7 oz)]   SpO2:  [91 %-98 %]   Daily Weight  02/01/24 : 103 kg (226 lb 13.7 oz)    Body mass index is 41.49 kg/m².    Physical Exam  Constitutional:       Appearance: Normal appearance.   HENT:      Head: Normocephalic and atraumatic.      Nose: Nose normal.   Eyes:      Extraocular Movements: Extraocular movements intact.      Conjunctiva/sclera: Conjunctivae normal.   Cardiovascular:      Heart sounds: Normal heart sounds, S1 normal and S2 normal.   Pulmonary:      Breath sounds: Decreased breath sounds present.   Abdominal:      General: Bowel sounds are normal.      Palpations: Abdomen is soft.   Musculoskeletal:      Cervical back: Normal range of motion and neck supple.   Skin:     Comments: Stage III sacral ulcer, bilateral posterior heel eschar -photos examined  Neurological:      Mental Status: She is alert.        Antibiotics  aspirin tablet 325 mg  acetaminophen (Tylenol) tablet 650 mg  cefepime (Maxipime) 1 g in dextrose 5 % 50 mL IV  sodium chloride 0.9 % bolus 2,229 mL  apixaban (Eliquis) tablet 2.5 mg  escitalopram (Lexapro) tablet 10 mg  febuxostat (Uloric) tablet 40 mg  fenofibrate (Triglide) tablet 160 mg  gabapentin (Neurontin) capsule 100 mg  midodrine (Proamatine) tablet 15 mg  nystatin (Mycostatin) 100,000 unit/gram powder  oxyCODONE-acetaminophen (Percocet) 5-325 mg per tablet 1 tablet  simvastatin (Zocor) tablet 20 mg  piperacillin-tazobactam-dextrose (Zosyn) IV 2.25 g  vancomycin (Vancocin) capsule 125 mg  oxygen (O2) therapy  dextrose 50 % injection 25 g  glucagon (Glucagen) injection  1 mg  dextrose 10 % in water (D10W) infusion  insulin lispro (HumaLOG) injection 0-10 Units  nystatin (Mycostatin) 100,000 unit/gram powder 1 Application  vancomycin-diluent combo no.1 (Xellia) IVPB 1,750 mg  piperacillin-tazobactam-dextrose (Zosyn) IV 2.25 g  sodium chloride 0.9 % bolus 500 mL  norepinephrine (Levophed) 8 mg in dextrose 5% 250 mL (0.032 mg/mL) infusion (premix)  vancomycin (Vancocin) placeholder  pantoprazole (ProtoNix) EC tablet 40 mg  pantoprazole (ProtoNix) injection 40 mg  sennosides-docusate sodium (Judy-Colace) 8.6-50 mg per tablet 1 tablet  doxycycline (Vibramycin) in dextrose 5 % in water (D5W) 100 mL  mg  LORazepam (Ativan) injection 2 mg  magnesium sulfate IV 2 g  zinc oxide 20 % ointment 1 Application  nystatin (Mycostatin) cream  norepinephrine (Levophed) 8 mg in dextrose 5% 250 mL (0.032 mg/mL) infusion (premix)  heparin 1,000 unit/mL injection 2,000 Units  heparin 1,000 unit/mL injection 2,000 Units  nystatin (Mycostatin) ointment  acetaminophen (Tylenol) oral liquid 1,000 mg  albumin human 25 % solution 12.5 g  perflutren lipid microspheres (Definity) injection 0.5-10 mL of dilution  sulfur hexafluoride microsphr (Lumason) injection 24.28 mg  perflutren protein A microsphere (Optison) injection 0.5 mL  ipratropium-albuteroL (Duo-Neb) 0.5-2.5 mg/3 mL nebulizer solution 3 mL  sodium chloride 3 % nebulizer solution 3 mL  vancomycin-diluent combo no.1 (Xellia) IVPB 750 mg  sennosides-docusate sodium (Judy-Colace) 8.6-50 mg per tablet 1 tablet  acetaminophen (Tylenol) tablet 650 mg  doxycycline (Vibramycin) capsule 100 mg  magnesium oxide (Mag-Ox) tablet 400 mg  vasopressin (Vasostrict) 0.2 unit/mL infusion  norepinephrine (Levophed) 8 mg in dextrose 5% 250 mL (0.032 mg/mL) infusion (premix)  heparin 1,000 unit/mL injection 2,000 Units  heparin 1,000 unit/mL injection 2,000 Units  albumin human 25 % solution 12.5 g  vancomycin (Xellia) 1 g in 200 mL (Xellia) IVPB 1  g  ipratropium-albuteroL (Duo-Neb) 0.5-2.5 mg/3 mL nebulizer solution 3 mL  sodium chloride 3 % nebulizer solution 3 mL  albumin human 5 % infusion 12.5 g  heparin 1,000 unit/mL injection 2,000 Units  heparin 1,000 unit/mL injection 2,000 Units  vancomycin (Vancocin) capsule 125 mg  albumin human 25 % solution 12.5 g  heparin 1,000 unit/mL injection 2,000 Units  heparin 1,000 unit/mL injection 2,000 Units  ipratropium-albuteroL (Duo-Neb) 0.5-2.5 mg/3 mL nebulizer solution 3 mL  lidocaine (Xylocaine) 10 mg/mL (1 %) injection 50 mg  sodium phosphate 15 mmol in sodium chloride 0.9% 250 mL IV  sod phos di, mono-K phos mono (K Phos Neutral) tablet 250 mg  potassium, sodium phosphates (Phos-NaK) 280-160-250 mg packet 1 packet  doxycycline (Vibramycin) capsule 100 mg  fludrocortisone (Florinef) tablet 0.1 mg      Relevant Results  Labs  Results from last 72 hours   Lab Units 02/01/24 0455 01/31/24  0423 01/30/24  0508   WBC AUTO x10*3/uL 15.5* 15.9* 15.6*   HEMOGLOBIN g/dL 9.2* 9.7* 9.9*   HEMATOCRIT % 29.6* 30.4* 30.6*   PLATELETS AUTO x10*3/uL 204 200 169       Results from last 72 hours   Lab Units 02/01/24 0455 01/31/24 0423 01/30/24  0508   SODIUM mmol/L 131* 134 133   POTASSIUM mmol/L 4.2 4.1 4.2   CHLORIDE mmol/L 99 98 98   CO2 mmol/L 21* 21* 24   BUN mg/dL 10 11 7*   CREATININE mg/dL 2.40* 2.80* 2.20*   GLUCOSE mg/dL 193* 160* 210*   CALCIUM mg/dL 8.3* 8.5 8.3*   ANION GAP mmol/L 11 15 11   EGFR mL/min/1.73m*2 21* 18* 23*   PHOSPHORUS mg/dL 2.5 2.6 2.0*       Results from last 72 hours   Lab Units 02/01/24 0455 01/31/24  0423 01/30/24  0508   ALK PHOS U/L 239* 253* 255*   BILIRUBIN TOTAL mg/dL 1.1 1.4* 1.4*   PROTEIN TOTAL g/dL 4.9* 5.0* 5.1*   ALT U/L 13 15 15   AST U/L 32 38 35   ALBUMIN g/dL 2.3* 2.5* 2.6*       Estimated Creatinine Clearance: 24.2 mL/min (A) (by C-G formula based on SCr of 2.4 mg/dL (H)).  C-Reactive Protein   Date Value Ref Range Status   12/25/2023 5.20 (H) 0.00 - 2.00 mg/dL Final     CRP    Date Value Ref Range Status   06/23/2023 19.9 (H) 0 - 2.0 MG/DL Final     Comment:     Performed at [place] [id number]   05/22/2022 1.0 0 - 2.0 MG/DL Final     Comment:     Performed at [address] [address]     Microbiology  Susceptibility data from last 14 days.  Collected Specimen Info Organism Ampicillin Cefazolin Cefazolin (uncomplicated UTIs only) Ciprofloxacin Gentamicin Piperacillin/Tazobactam Tetracycline Trimethoprim/Sulfamethoxazole   01/19/24 Urine from Indwelling (Amos) Catheter Proteus mirabilis S S S S S S R S     Reviewed   Imaging  Upper extremity venous duplex bilateral    Result Date: 1/25/2024           [place] [address]            Phone [phone]  Vascular Lab Report  [facility] UPPER EXTREMITY VENOUS DUPLEX BILATERAL Patient Name:      BASIA Gray Physician:  [id number] Mini Busby MD, RPVI Study Date:        1/25/2024           Ordering Provider:  [id number] ERI BERMAN MRN/PID:           [id number]            Fellow: Accession#:        [id number]        Technologist:       Dara Parr RVT Date of Birth/Age: 1950 / 73 years Technologist 2: Gender:            F                   Encounter#:         [id number] Admission Status:  Inpatient           Location Performed: [facility]  Diagnosis/ICD: Right arm swelling-M79.89; Left arm swelling-M79.89 CPT Codes:     26969 Peripheral venous duplex scan for DVT complete  CONCLUSIONS:  Right Upper Venous: No evidence of acute deep vein thrombus visualized in the right upper extremity. Internal jugular vein was visualized in segments due to IV lines and bandages. Left Upper Venous: No evidence of acute deep vein thrombus visualized in the left upper extremity. Subclavian stent is noted and appears patent. There is a known occluded dialysis access noted.   Additional Findings: Technically difficult exam due to IV lines, bandages, and patient's positioning.  Imaging & Doppler Findings:  Right               Compressible Thrombus        Flow Internal Jugular        Yes        None   Spontaneous/Phasic Subclavian              Yes        None Subclavian Proximal     Yes        None   Spontaneous/Phasic Subclavian Mid          Yes        None Subclavian Distal       Yes        None   Spontaneous/Phasic Axillary                Yes        None       Pulsatile Brachial                Yes        None Cephalic                Yes        None Basilic                 Yes        None  Left                Compress Thrombus        Flow Internal Jugular      Yes      None       Pulsatile Subclavian            Yes      None Subclavian Proximal   Yes      None       Pulsatile Subclavian Mid        Yes      None       Pulsatile Subclavian Distal     Yes      None       Pulsatile Axillary              Yes      None   Spontaneous/Phasic Brachial              Yes      None Cephalic              Yes      None Basilic               Yes      None  [id number] Mini Busby MD, RPVI Electronically signed by [id number] Mini Busby MD, RPVI on 1/25/2024 at 4:06:13 PM  ** Final **     ECG 12 lead    Result Date: 1/24/2024  Sinus tachycardia  with 1st degree AV block Right bundle branch block Possible Lateral infarct , age undetermined Abnormal ECG Confirmed by Yesika Nj ([id number]) on 1/24/2024 6:54:45 AM    XR tibia fibula right 2 views    Result Date: 1/22/2024  Interpreted By:  Real Mendieta, STUDY: XR TIBIA FIBULA RIGHT 2 VIEWS; 1/22/2024 2:15 pm   INDICATION: Signs/Symptoms:evaluate source of infection, osteomyelitis;   COMPARISON: None available.   ACCESSION NUMBER(S): [id number]   ORDERING CLINICIAN: BAN GIFFORD   TECHNIQUE: Views: AP and Lateral of the right tibia and fibula   FINDINGS: RESULT: There is no evidence for fracture or dislocation. Tricompartmental degenerative changes of the  right knee. The tibia and fibula appear intact without bony erosion or evidence for osteomyelitis. No other bony or soft tissue abnormality is identified. No subcutaneous gas is noted.       No evidence for acute osseous abnormality. No bony erosion to suggest osteomyelitis.   Signed by: Real Mendieta 1/22/2024 3:15 PM Dictation workstation:   [id number]    XR ankle right 3+ views    Result Date: 1/22/2024  Interpreted By:  Real Mendieta, STUDY: XR ANKLE RIGHT 3+ VIEWS; 1/22/2024 2:15 pm   INDICATION: Signs/Symptoms:Evuluate source of infection, osteomyelitis;   COMPARISON: None available.   ACCESSION NUMBER(S): [id number]   ORDERING CLINICIAN: BAN GIFFORD   TECHNIQUE: Views: AP, Lateral, Oblique, right ankle   FINDINGS: RESULT: There is no evidence for fracture or dislocation. The ankle mortise is intact. Joint spaces appear adequately maintained. No bony erosion to suggest osteomyelitis. No bone lesion or soft tissue abnormality is identified. No subcutaneous gas is seen.       No evidence for acute osseous abnormality. No bony erosion to suggest osteomyelitis.   Signed by: Real Mendieta 1/22/2024 3:14 PM Dictation workstation:   [id number]    Transthoracic Echo (TTE) Complete    Result Date: 1/22/2024           [place] [address]            Phone [phone] TRANSTHORACIC ECHOCARDIOGRAM REPORT  Patient Name:      BASIA FREIDA Gray Physician:   [id number] [facility] Study Date:        1/22/2024           Ordering Provider:   [id number] ERI BERMAN MRN/PID:           [id number]            Fellow: Accession#:        [id number]        Nurse: Date of Birth/Age: 1950 / 73 years Sonographer:         Tabatha Page RDCS Gender:            F                   Additional Staff: Height:            157.00 cm           Admit Date: Weight:            75.00 kg            Admission Status:     Inpatient - Routine BSA:               1.76 m2             Department Location: [facility] ICU Blood Pressure: 88 /49 mmHg Study Type:    TRANSTHORACIC ECHO (TTE) COMPLETE Diagnosis/ICD: Chronic combined systolic (congestive) and diastolic (congestive)                heart failure (CHF)-I50.42; Sepsis, unspecified organism-A41.9 Indication:    heart failure,septic shock CPT Codes:     Echo Complete w Full Doppler-91154 Patient History: BMI:               Obese >30 Pertinent History: Sepsis, PVD, A-Fib, CVA, Cancer and HTN. breast                    prosthesis/ca, ESRD,anemia,septic shock,heart failure. Study Detail: The following Echo studies were performed: 2D, M-Mode, Doppler and               color flow. Technically challenging study due to prosthesis,               prominent lung artifact and body habitus.  PHYSICIAN INTERPRETATION: Left Ventricle: Left ventricular systolic function is normal, with an estimated ejection fraction of 70%. There are no regional wall motion abnormalities. The left ventricular cavity size is normal. Left ventricular diastolic filling was indeterminate. Left Atrium: The left atrium is moderately dilated. Right Ventricle: The right ventricle is normal in size. There is normal right ventricular global systolic function. Right Atrium: The right atrium is normal in size. Aortic Valve: The aortic valve is trileaflet. There is no evidence of aortic valve regurgitation. The peak instantaneous gradient of the aortic valve is 2.8 mmHg. Mitral Valve: The mitral valve is normal in structure. There is no evidence of mitral valve regurgitation. Tricuspid Valve: The tricuspid valve is structurally normal. There is trace tricuspid regurgitation. Pulmonic Valve: The pulmonic valve is not well visualized. The pulmonic valve regurgitation was not well visualized. Pericardium: There is no pericardial effusion noted. Aorta: The aortic root is normal.  CONCLUSIONS:  1. Left ventricular systolic function is  normal with a 70% estimated ejection fraction.  2. The left atrium is moderately dilated.  3. Left ventricular diastolic filling indeterminate. QUANTITATIVE DATA SUMMARY: 2D MEASUREMENTS:                          Normal Ranges: LAs:           4.50 cm   (2.7-4.0cm) IVSd:          0.61 cm   (0.6-1.1cm) LVPWd:         0.88 cm   (0.6-1.1cm) LVIDd:         3.66 cm   (3.9-5.9cm) LV Mass Index: 41.9 g/m2 LV SYSTOLIC FUNCTION BY 2D PLANIMETRY (MOD):                     Normal Ranges: EF-A4C View: 68.3 % (>=55%) LV DIASTOLIC FUNCTION:                     Normal Ranges: MV Peak E: 1.19 m/s (0.7-1.2 m/s) MV Peak A: 0.90 m/s (0.42-0.7 m/s) E/A Ratio: 1.32     (1.0-2.2) MITRAL VALVE:                 Normal Ranges: MV DT: 192 msec (150-240msec) AORTIC VALVE:                         Normal Ranges: AoV Vmax:      0.84 m/s (<=1.7m/s) AoV Peak P.8 mmHg (<20mmHg) LVOT Max Shimon:  0.47 m/s (<=1.1m/s) LVOT Diameter: 1.90 cm  (1.8-2.4cm) AoV Area,Vmax: 1.57 cm2 (2.5-4.5cm2) TRICUSPID VALVE/RVSP:                   Normal Ranges: IVC Diam: 1.05 cm  [id number] [facility] Electronically signed on 2024 at 2:53:04 PM  ** Final **     XR foot left 3+ views    Result Date: 2024  Interpreted By:  Real Mendieta, STUDY: XR FOOT LEFT 1-2 VIEWS; 2024 4:15 pm   INDICATION: Signs/Symptoms:osteomyelitis. Pain   COMPARISON: 2023   ACCESSION NUMBER(S): [id number]   ORDERING CLINICIAN: BAN GIFFORD   TECHNIQUE: Views:  AP, Lat, Oblique, left foot   FINDINGS: RESULT: There is no evidence for fracture or dislocation. 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 Mild hammertoe deformities. Joint spaces appear adequately maintained. Soft tissue ulceration of the posterior aspect of the heel however no evidence for bony erosion to suggest osteomyelitis.       No evidence for acute fracture or acute bony erosion to suggest osteomyelitis. Chronic changes as described.   Signed by: Real Mendieta 2024 7:15  PM Dictation workstation:   [id number]    CT chest abdomen pelvis wo IV contrast    Result Date: 1/21/2024  Interpreted By:  Maria Garcia, STUDY: CT CHEST ABDOMEN PELVIS WO CONTRAST;  1/20/2024 11:42 pm   INDICATION: Mental status change. Elevated white blood cell count with infectious workup.   COMPARISON: 08/20/2023   ACCESSION NUMBER(S): [id number]   ORDERING CLINICIAN: BAN GIFFORD   TECHNIQUE: CT of the chest, abdomen and pelvis was performed with no oral or intravenous contrast administered. Sagittal and coronal reformations were completed by the technologist at the acquisition scanner.   All CT examinations are performed with 1 or more of the following dose reduction techniques: Automated exposure control, adjustment of mA and/or kv according to patient's size, or use of iterative reconstruction techniques.   FINDINGS: Please note that the study is limited without intravenous contrast.   CHEST: Bilateral pleural effusions are present with right effusion moderately small in size and with the left effusion moderately small in size as well. There is shift of the heart and mediastinum to the left of midline due to atelectasis of the left upper lobe. There is consolidation throughout left lower lobe with air bronchograms noted. On the right, there is compressive atelectasis seen posteriorly in the right lower lobe.   There is mild reactive mediastinal adenopathy seen at this time with mediastinal lymph nodes increased in size since prior study. Several of these mediastinal nodes are at the upper limits of normal measuring 8 mm in short axis diameter.   No pericardial effusion is present. The cardiac size is normal with mitral annulus calcification.   There has been prior bilateral mastectomy with reconstruction of the left breast with implant placement. Surgical clips are visible within the left axilla. Stent grafts are visible within the left upper extremity and within the left innominate, subclavian as well as  axillary veins.   A peripherally calcified 1.8 cm nodule arises from the lower pole of left thyroid lobe.   ABDOMEN:   LIVER: Unremarkable.   BILE DUCTS: No intrahepatic or extrahepatic biliary ductal dilatation is seen.   GALLBLADDER: Cholelithiasis is observed with some calcification of the gallbladder wall demonstrated as well. No gallbladder wall thickening or pericholecystic fluid is evident.   PANCREAS: Unremarkable   SPLEEN: Unremarkable   ADRENAL GLANDS: Unremarkable   KIDNEYS AND URETERS: Kidneys are small in size with extensive renal artery calcification demonstrated.   PELVIS:   BLADDER: Amos catheter is present within the decompressed urinary bladder.   REPRODUCTIVE ORGANS: Calcification of the arcuate arteries within the uterus is seen. There is no uterine enlargement or adnexal mass.   BOWEL: A rectal balloon is seen. There is no abnormal distention of the intestinal tract.   VESSELS: There is atherosclerosis of the thoracoabdominal aorta and iliac arteries with calcification in the walls of the celiac, splenic, hepatic, and mesenteric arteries.   PERITONEUM/RETROPERITONEUM/LYMPH NODES: There is a minimal amount of ascites seen about the liver and spleen with mild presacral edema noted.   ABDOMINAL WALL: There is anasarca involving the soft tissues of the abdomen and pelvis. Postoperative change from ventral hernia repair is seen.   BONES: Bilateral sacroiliitis is seen. There is lumbar dextroscoliosis. Chronic rotator cuff tear is present bilaterally with osteoarthritis of both shoulders, right greater than left.       Chest 1.  Moderately small bilateral pleural effusions with left upper lobe atelectasis and compressive atelectasis seen posteriorly in right lower lobe. A left lower lobe pneumonia is identified. There is extensive airspace consolidation within left lower lobe with air bronchograms observed. Mild mediastinal reactive adenopathy is present as well.   Abdomen-Pelvis 1.  Cholelithiasis  without evidence of cholecystitis. 2. Small amount of ascites with mild presacral edema and anasarca within the soft tissues of the abdominal wall. 3. Renal atrophy with extensive vascular calcifications.     MACRO: None   Signed by: Maria Garcia 1/21/2024 8:30 AM Dictation workstation:   [id number]    CT head wo IV contrast    Result Date: 1/21/2024  Interpreted By:  Maria Garcia, STUDY: CT HEAD WO IV CONTRAST 1/20/2024 11:42 pm   INDICATION: Signs/Symptoms:mental status changes   COMPARISON: 12/11/2023   ACCESSION NUMBER(S): [id number]   ORDERING CLINICIAN: BAN GIFFORD   TECHNIQUE: Unenhanced axial images of the brain are completed.   All CT examinations are performed with 1 or more of the following dose reduction techniques: Automated exposure control, adjustment of mA and/or kv according to patient's size, or use of iterative reconstruction techniques.   FINDINGS: Helical unenhanced axial images of the brain demonstrate a mild to moderate degree of ventricular enlargement with proportionate widening of the sulci and sylvian fissures. There is no midline shift, mass effect, extra-axial fluid collection, or acute intracranial hemorrhage. There is diminished density seen in the periventricular white matter indicating chronic microvascular ischemic disease. There is calcified plaque seen within the distal vertebral and internal carotid arteries bilaterally. No calvarial abnormality is seen.       Atrophy and chronic microvascular ischemic disease without acute intracranial process.   Signed by: Maria Garcia 1/21/2024 8:09 AM Dictation workstation:   [id number]    XR chest 1 view    Result Date: 1/20/2024  Interpreted By:  Brendon Perez, STUDY: XR CHEST 1 VIEW; 1/20/2024 5:03 pm   INDICATION: CLINICAL INFORMATION: Signs/Symptoms:Insertion right IJ central line, also left thoracentesis.   COMPARISON: 01/19/2024 at 1338 hours   ACCESSION NUMBER(S): [id number]   ORDERING CLINICIAN: BOZENA ANTONIO   TECHNIQUE: Portable  chest one view.   FINDINGS: The cardiac size is indeterminate in view of the AP projection. There is no change in the right-sided dual port central venous catheter. There is a new right internal jugular venous catheter present with the tip at approximately same level as the dual port central venous catheter, overlying the mid to lower right atrium. There is no evidence for pneumothorax. Patient has a history of left thoracentesis without evidence for pneumothorax on the left. There is bilateral basilar alveolar infiltrate and effusions. Left effusion is decreased compared to the study from 1 day earlier.   Surgical clips are identified within left chest wall. Endovascular stent is identified in the distribution of the left subclavian artery.       1. Status post right-sided central venous catheter placement as described above with the tip overlying the mid to caudal aspect of the right atrium. There is no evidence for pneumothorax. 2. No evidence for left-sided pneumothorax after left-sided thoracentesis. Decrease in the left effusion. 3. Bilateral basilar infiltrates and effusions are present. Follow-up to assure complete clearing is suggested.   MACRO: none   Signed by: Brendon Perez 1/20/2024 5:11 PM Dictation workstation:   [id number]    XR chest 1 view    Result Date: 1/19/2024  Interpreted By:  Real Mendieta, STUDY: XR CHEST 1 VIEW; 1/19/2024 1:38 pm   INDICATION: Signs/Symptoms:dyspnea.   COMPARISON: 12/26/2023   ACCESSION NUMBER(S): [id number]   ORDERING CLINICIAN: EMELY GOLDSMITH   TECHNIQUE: 1 view of the chest was performed.   FINDINGS: There may be a minimal right pleural effusion. Slight prominence of the interstitium otherwise the right lung is clear. Improved appearance of the left lung with improved aeration of the right upper lobe. There is opacification of the left lower lobe possibly due to large pleural effusion which is similar to the prior study. No pneumothorax. Right-sided dual lumen  central line catheter with tip at the proximal atrium. The cardiomediastinal silhouette is within normal limits. Left-sided subclavian stents are again noted.       Improved aeration and appearance of the left lung superiorly however there is a persistent large left pleural effusion and opacification of the left lower lobe.   Signed by: Real Mendieta 1/19/2024 1:44 PM Dictation workstation:   [id number]    Electrocardiogram, 12-lead PRN ACS symptoms    Result Date: 1/7/2024  Sinus rhythm Right bundle branch block Septal infarct (cited on or before 06-DEC-2023) Abnormal ECG When compared with ECG of 06-DEC-2023 13:06, Questionable change in initial forces of Septal leads Confirmed by Herminio Leums ([id number]) on 1/7/2024 11:04:01 AM     Assessment/Plan     Septic shock-on pressors-resolving  Left-sided pleural effusion   Left lower lobe pneumonia-treated  Proteus urinary tract infection-treated  History of MRSA endocarditis  History of C. 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

## 2024-02-01 NOTE — PROGRESS NOTES
"Ayanna Gross is a 73 y.o. female on day 13 of admission presenting with Septic shock (CMS/HCC).    Subjective   Remains on Levophed. Wound images updated in chart per RN.    Objective     Physical Exam  Constitutional:       Appearance: She is ill-appearing.   HENT:      Head: Normocephalic and atraumatic.   Cardiovascular:      Rate and Rhythm: Normal rate.   Pulmonary:      Effort: Pulmonary effort is normal.   Abdominal:      General: Abdomen is flat. Bowel sounds are normal.   Skin:     General: Skin is warm and dry.      Findings: Rash present.      Comments: Heel wounds with eschar. Sacral rash red and present to b/l buttocks and sacrum.    Neurological:      Mental Status: She is alert. She is disoriented.      Motor: Weakness present.         Last Recorded Vitals  Blood pressure (!) 63/23, pulse 107, temperature 36.9 °C (98.4 °F), temperature source Temporal, resp. rate 20, height 1.575 m (5' 2\"), weight 103 kg (226 lb 13.7 oz), SpO2 97 %.  Intake/Output last 3 Shifts:  I/O last 3 completed shifts:  In: 819.4 (8.8 mL/kg) [P.O.:240; I.V.:579.4 (6.2 mL/kg)]  Out: 2150 (23.1 mL/kg) [Other:1700; Stool:450]  Dosing Weight: 93 kg     Relevant Results  Lab Results   Component Value Date    WBC 15.5 (H) 02/01/2024    HGB 9.2 (L) 02/01/2024    HCT 29.6 (L) 02/01/2024    MCV 95 02/01/2024     02/01/2024     Lab Results   Component Value Date    GLUCOSE 193 (H) 02/01/2024    CALCIUM 8.3 (L) 02/01/2024     (L) 02/01/2024    K 4.2 02/01/2024    CO2 21 (L) 02/01/2024    CL 99 02/01/2024    BUN 10 02/01/2024    CREATININE 2.40 (H) 02/01/2024       Assessment/Plan   Principal Problem:    Septic shock (CMS/HCC)  Active Problems:    Pneumonia    Low blood pressure    End stage renal disease (CMS/HCC)    Anemia due to blood loss, acute  2/1: No new recommendations. Patient states her buttocks feels better. Will follow intermittently.    1/30: Continue wound care with current recommendations. Will follow " intermittently.     1/29: Sacral rash improving with nystatin and zinc oxide. Specialty mattress, supplements. Daily wound care. No new recommendations.     1/26/24: continue with daily wound care. Podiatry following for heel wounds. Wait to see if permcath will be needed. Supplements, specialty mattress, daily wound care, Christian.     1/25: Continue with Nystatin and Zinc paste to sacrum. Per podiatry note, her heel wounds are stable and should continue with betadine and dry dressings. Should the wound become unstable, she may be able to have debridement. Will continue to follow for wound care.     1/24: Continue with nystatin and zinc paste to sacral area. Vascular saw patient and she is not a candidate for surgical intervention to heel wounds due to high dose pressor requirement. I do not see any recs from podiatry; however, per wound care RN note, there was a possibility of debridement today. I have reached out to podiatry. For now, continue to paint with betadine and cover with kerlex.    1/23: Moisture associated sacral rash washed with soap and water and a combination of triad and nystatin powder applied. Podiatry is currently present at the bedside, awaiting recs for heel wounds. Will follow for wound care.     1/22:Heel wounds to be evaluated by podiatry for possible debridement. For sacral dermatitis, clean with soap and water, dry completely and apply mixture of zinc oxide and nystatin.        I spent 15 minutes in the professional and overall care of this patient.      Nickolas Gallego APRN-CNP    Agree with above, continue wound care until resolution of skin issues.   Re-contact general surgery if any new issues arise

## 2024-02-01 NOTE — NURSING NOTE
Patient with Rt arm single lumen midline, dressing with old dried blood, dressing change done using sterile technique, no active bleeding, drainage or swelling, ext catheter at 2 cm, blue cap changed, flushes easily and with positive blood return, IV medicine resumed. Rt chest dialysis catheters, dressing D&I dated 1/29

## 2024-02-01 NOTE — PROGRESS NOTES
[facility]       Date:  2/1/2024  Patient:  Ayanna Gross  YOB: 1950  MRN:  [id number]   Admit Date:  1/19/2024  ========================================================================================================    Chief Complaint   Patient presents with    Shortness of Breath     Patient with SOB and fever. Is very lethargic. Dialysis MWF but did not go today dt SOB and fever         History of Present Illness:  Ayanna Gross is a 73 y.o. year old female patient with Past Medical History of  esenting with anemia, fever worsening sacral and lower extremity wounds and concerns from the nursing facility for left pneumonia. Patient presents to the emergency department found to be febrile she was hypotensive but responded to 500 cc fluid bolus she was found to have a hemoglobin of 6 she was begun and ordered for a single unit of blood transfusion will actually provide 2 units of packed cells additional IV fluids to complete her volume resuscitation given her fever clinical evidence of hypovolemia and infection with an elevated lactate of 2.3 she received a total of approximately 1800 mL of fluid she is DNR/DNI by advanced directives but ICU level care is acceptable her granddaughter questions disorder but it was confirmed and signed by her  who is her POA.  She will be admitted to the intensive care unit given her frail compromised state and high risk of morbidity and mortality necessitating IV antibiotics fluids blood transfusion and ultimately will receive dialysis likely tomorrow wound care consultation to general surgery has been requested as well as consultation with the intensivist infectious disease and nephrology wounds are cultured C. difficile is requested she remains on oral vancomycin for suppression given recent C. difficile infection in December she is initiated on pharmacy dosing of vancomycin and Zosyn 2.25 g IV every 6 hours. She continues on Eliquis  at this time for DVT prophylaxis as well as atrial fibrillation.     Further Hx: Ayanna Gross 73-year-old female with past medical history of atrial fibrillation, end-stage renal disease, C. difficile infection, moderate sized recurrent left pleural effusions, and MRSA bacteremia with mitral valve vegetation admitted to ICU for management of septic shock.  On morning exam patient was on 0.22 of levo just to maintain a MAP of 60 and SBP of 88.  Last documented echocardiogram from [facility] shows EF between 55 to 60% with grade 2 left ventricular diastolic dysfunction, and moderate circumferential pericardial effusion.  Unable to find adequate documentation of who treated her endocarditis, but there was documentation that she was currently still receiving treatment when she was admitted to [facility]. On further chart review Dr. Coulter is her infectious disease doctor. She also had 2 thoracentesis's for moderate to large sided left pleural effusions with cytology showing no malignancy, but unable to differentiate between exudative and transudative at this time.        Interval ICU Events:  1/20: Pt missed last dialysis appointment, no signs of urgent need today, but nephrology Dr. Cabello is following. Pt on high requirements of levophed. She is on broad spectrum abx for coverage currently ID Dr. Coulter following. Pt switches from being A&O x 3 and lethargy. Goal today is to get more central access, david, and thoracentesis.      1/21: Pt more lethargic this AM requiring uptitration of her Levophed to 0.28. Diagnostic and therapeutic thoracentesis yesterday over 1L of hazy straw color fluid. Currently still on vanc, zosyn, and doxy for abx. Potential dialysis this today, but no urgency.      1/22: Pt come in and out of lethargic states throughout the day. Still on high doses of levophed this morning. Per nephrologist Dr. Cabello titrate for an SBP goal of 70 and mentation. Left Pleural  fluid analysis resulted as transudative per lights criteria. Tablo today per nephrology. ID managing with empiric coverage. Source control may not have been met. Podiatry, wound, and surgery to evaluate multiple wounds over the body.      1/23: Pt received dialysis via tablo yesterday. Very similar status as yesterday from lethargic states to well mentation. Patient remains on Levophed still at 0.36 with empiric coverage with vanc + zosyn & doxy. Increase in patients total bilirubin today 0.9-1.6. Consulting services include: Wound, Vascular, Podiatry, Acute surgery, Palliative, Infectious Disease, and Nephrology.      1/24: Patient stable overnight, norepinephrine weaned down to 0.16 with stable pressures and new goal of systolics > 70     1/25: Patient resting comfortably in bed this morning requiring 0.05 of Levophed for systolic goal of 70. She is more alert than prior and complains of less pain. She will receive Tablo today with 1.5L of fluid removed per Nephrology.      1/26: Patient resting comfortably in bed this morning requiring 0.06 Levophed and midodrine 15 mg TID. Tablo yesterday with 1.5L removed. Empiric abx stopped 2 days ago. SLP signed off with recommendations.       1/27: Patient resting comfortable in bed this morning requiring 0.06 of levophed and midodrine 15 mg TID. Potential Tablo today. No acute events overnight. Patient remains at baseline.     1/29: No acute events overnight.  Pt resting comfortable in bed this morning requiring 0.04 of levophed and midodrine 15mg TID. Plan for dialysis via tablo today per nephrology.      1/30: Pt requiring increased levophed overnight at .12 and down do .09 this morning.  Dialysis via tablo yesterday with -1L.  Pt started on fludrocortisone 0.1 tid.  Spent extensive time with the patient discussing goals of care and plan for a family meeting tomorrow.     1/31: Pt maintained on 0.09 of levophed overnight.  Plan for dialysis via tablo today.  Continue  goals of care discussion with a family meeting scheduled tonight at 1630.      2/1: Pt maintained on levophed overnight.  WBC down trending.  Continue goals of care discussion between LTAC and hospice.      Medical History:  Past Medical History:   Diagnosis Date    Asthma     CAD (coronary artery disease)     CHF (congestive heart failure) (CMS/[facility])     Chronic kidney disease on chronic dialysis (CMS/[facility])     Hypertension     Personal history of diseases of the blood and blood-forming organs and certain disorders involving the immune mechanism     History of autoimmune disorder    Sleep apnea     Type 2 diabetes mellitus without complications (CMS/[facility]) 09/15/2022    Diabetes mellitus     Past Surgical History:   Procedure Laterality Date    CARPAL TUNNEL RELEASE  11/18/2013    Neuroplasty Decompression Median Nerve At Carpal Tunnel    HAND SURGERY  11/18/2013    Hand Surgery                                                                                                                                                          MASTECTOMY, PARTIAL  04/10/2014    Right Breast Partial Mastectomy    MR HEAD ANGIO WO IV CONTRAST  8/29/2023    MR HEAD ANGIO WO IV CONTRAST [facility]    OTHER SURGICAL HISTORY  11/18/2013    Breast Reconstruction With Implant Prosthesis    OTHER SURGICAL HISTORY  11/18/2013    Thyroid Surgery Substernal Thyroidectomy Partial    OTHER SURGICAL HISTORY  11/18/2013    Modified Radical Mastectomy Left Breast    OTHER SURGICAL HISTORY  04/04/2022    Carpal tunnel surgery    OTHER SURGICAL HISTORY  04/04/2022    Foot surgery    OTHER SURGICAL HISTORY  04/04/2022    Hernia repair    SENTINEL LYMPH NODE BIOPSY  04/10/2014    [place] Lymph Node Biopsy    TONSILLECTOMY  11/18/2013    Tonsillectomy    UMBILICAL HERNIA REPAIR  11/18/2013    Umbilical Hernia Repair    US GUIDED PERCUTANEOUS PLACEMENT  6/29/2023    US GUIDED PERCUTANEOUS PLACEMENT [facility]     Medications Prior to  Admission   Medication Sig Dispense Refill Last Dose    apixaban (Eliquis) 2.5 mg tablet Take 1 tablet (2.5 mg) by mouth 2 times a day.       blood sugar diagnostic strip 1 x daily e11.65 for 90 days       blood-glucose sensor (Dexcom G6 Sensor) device Check blood sugar when needed and as instructed 6 each 3     doxycycline (Vibramycin) 100 mg capsule Take 1 capsule (100 mg) by mouth once daily. Take with at least 8 ounces (large glass) of water, do not lie down for 30 minutes after Do not start before January 6, 2024.       escitalopram (Lexapro) 10 mg tablet 1 tablet Orally Once a day for 30 day(s)       febuxostat (Uloric) 40 mg tablet TAKE ONE TABLET BY MOUTH EVERY OTHER DAY       fenofibrate (Triglide) 160 mg tablet Take 1 tablet (160 mg) by mouth once daily.       gabapentin (Neurontin) 100 mg capsule Take 1 capsule (100 mg) by mouth 2 times a day.       insulin lispro (HumaLOG) 100 unit/mL injection Inject 0-0.1 mL (0-10 Units) under the skin 3 times a day with meals. Take as directed per insulin instructions.       midodrine (Proamatine) 5 mg tablet Take 3 tablets (15 mg) by mouth 3 times a day with meals.       nystatin (Mycostatin) 100,000 unit/gram powder APPLY 1 GRAM TO SKIN TWO TIMES A DAY 30 g 0     oxyCODONE-acetaminophen (Percocet) 5-325 mg tablet Take 1 tablet by mouth every 12 hours if needed for severe pain (7 - 10). 120 tablet 0     simvastatin (Zocor) 20 mg tablet TAKE 1 TABLET BY MOUTH DAILY 60 tablet 5      Patient has no known allergies.  Social History     Tobacco Use    Smoking status: Never    Smokeless tobacco: Never   Substance Use Topics    Alcohol use: Not Currently    Drug use: Never     Family History   Problem Relation Name Age of Onset    Lymphoma Mother      Prostate cancer Father          passed away fabiana min 2017       Review of Systems:  14 point review of systems was completed and negative except for those specially mention in my HPI    Physical Exam:    Physical  Exam  Constitutional:       Appearance: She is ill-appearing.   HENT:      Head: Normocephalic.      Mouth/Throat:      Mouth: Mucous membranes are moist.      Pharynx: Oropharynx is clear.   Eyes:      Extraocular Movements: Extraocular movements intact.      Pupils: Pupils are equal, round, and reactive to light.   Cardiovascular:      Rate and Rhythm: Normal rate and regular rhythm.      Pulses: Normal pulses.      Heart sounds: Normal heart sounds.   Pulmonary:      Effort: Pulmonary effort is normal.      Breath sounds: Wheezing and rales present.   Abdominal:      General: Bowel sounds are normal.      Palpations: Abdomen is soft.   Musculoskeletal:         General: Swelling present.      Cervical back: Normal range of motion.      Right lower leg: Edema present.      Left lower leg: Edema present.   Skin:     General: Skin is warm.      Capillary Refill: Capillary refill takes more than 3 seconds.      Coloration: Skin is pale.      Findings: Bruising and erythema present.      Comments: Ulcers on bilateral heels and left shin.  Ulcer on sacrum.  Skin tear to Left hand.  Edema to bilateral uppers but R>L.      Neurological:      Mental Status: She is alert and oriented to person, place, and time.      Motor: Weakness present.   Psychiatric:         Mood and Affect: Mood normal.         Behavior: Behavior normal.         Vitals:  Most recent :  BP: 78/50  Pulse: 106  Resp: 15  Temp: 36.9  SpO2: 94%    24hr Min/Max:  Temp  Min: 36.9 °C (98.4 °F)  Max: 36.9 °C (98.4 °F)  Pulse  Min: 99  Max: 112  Resp  Min: 13  Max: 29  SpO2  Min: 91 %  Max: 98 %      Objective:    Scheduled Medications:  acetaminophen, 1,000 mg, oral, TID  apixaban, 2.5 mg, oral, BID  doxycylcine, 100 mg, oral, Daily  escitalopram, 10 mg, oral, Nightly  fludrocortisone, 0.1 mg, oral, TID  gabapentin, 100 mg, oral, TID  heparin, 2,000 Units, intra-catheter, After Dialysis  heparin, 2,000 Units, intra-catheter, After Dialysis  insulin lispro,  0-10 Units, subcutaneous, TID with meals  ipratropium-albuteroL, 3 mL, nebulization, q6h while awake  midodrine, 15 mg, oral, TID with meals  nystatin, , Topical, BID  nystatin, , Topical, BID  pantoprazole, 40 mg, oral, Daily   Or  pantoprazole, 40 mg, intravenous, Daily  sodium chloride, 3 mL, nebulization, TID  vancomycin, 125 mg, oral, BID  zinc oxide, 1 Application, Topical, BID        Continuous Medications:  norepinephrine, 0.01-3 mcg/kg/min, Last Rate: 0.07 mcg/kg/min (02/01/24 1314)        PRN Medications:  PRN medications: benzocaine-menthoL, dextrose 10 % in water (D10W), dextrose, glucagon, oxygen, sennosides-docusate sodium    Labs/Radiology/Diagnostic Review:  Results for orders placed or performed during the hospital encounter of 01/19/24 (from the past 24 hour(s))   POCT GLUCOSE   Result Value Ref Range    POCT Glucose 187 (H) 74 - 99 mg/dL   POCT GLUCOSE   Result Value Ref Range    POCT Glucose 250 (H) 74 - 99 mg/dL   CBC   Result Value Ref Range    WBC 15.5 (H) 4.4 - 11.3 x10*3/uL    nRBC 0.0 0.0 - 0.0 /100 WBCs    RBC 3.13 (L) 4.00 - 5.20 x10*6/uL    Hemoglobin 9.2 (L) 12.0 - 16.0 g/dL    Hematocrit 29.6 (L) 36.0 - 46.0 %    MCV 95 80 - 100 fL    MCH 29.4 26.0 - 34.0 pg    MCHC 31.1 (L) 32.0 - 36.0 g/dL    RDW 25.0 (H) 11.5 - 14.5 %    Platelets 204 150 - 450 x10*3/uL   Comprehensive metabolic panel   Result Value Ref Range    Glucose 193 (H) 65 - 99 mg/dL    Sodium 131 (L) 133 - 145 mmol/L    Potassium 4.2 3.4 - 5.1 mmol/L    Chloride 99 97 - 107 mmol/L    Bicarbonate 21 (L) 24 - 31 mmol/L    Urea Nitrogen 10 8 - 25 mg/dL    Creatinine 2.40 (H) 0.40 - 1.60 mg/dL    eGFR 21 (L) >60 mL/min/1.73m*2    Calcium 8.3 (L) 8.5 - 10.4 mg/dL    Albumin 2.3 (L) 3.5 - 5.0 g/dL    Alkaline Phosphatase 239 (H) 35 - 125 U/L    Total Protein 4.9 (L) 5.9 - 7.9 g/dL    AST 32 5 - 40 U/L    Bilirubin, Total 1.1 0.1 - 1.2 mg/dL    ALT 13 5 - 40 U/L    Anion Gap 11 <=19 mmol/L   Magnesium   Result Value Ref Range     Magnesium 1.60 1.60 - 3.10 mg/dL   Phosphorus   Result Value Ref Range    Phosphorus 2.5 2.5 - 4.5 mg/dL   POCT GLUCOSE   Result Value Ref Range    POCT Glucose 191 (H) 74 - 99 mg/dL   POCT GLUCOSE   Result Value Ref Range    POCT Glucose 209 (H) 74 - 99 mg/dL      Upper extremity venous duplex bilateral    Result Date: 1/25/2024           [place] [address]            Phone [phone]  Vascular Lab Report  [facility] US UPPER EXTREMITY VENOUS DUPLEX BILATERAL Patient Name:      BASIA GUAN VEGA      Reading Physician:  [id number] Mini Busyb MD, RPVI Study Date:        1/25/2024           Ordering Provider:  [id number] ERI BERMAN MRN/PID:           [id number]            Fellow: Accession#:        [id number]        Technologist:       Dara Parr RVRICHIE Date of Birth/Age: 1950 / 73 years Technologist 2: Gender:            F                   Encounter#:         [id number] Admission Status:  Inpatient           Location Performed: [facility]  Diagnosis/ICD: Right arm swelling-M79.89; Left arm swelling-M79.89 CPT Codes:     52362 Peripheral venous duplex scan for DVT complete  CONCLUSIONS:  Right Upper Venous: No evidence of acute deep vein thrombus visualized in the right upper extremity. Internal jugular vein was visualized in segments due to IV lines and bandages. Left Upper Venous: No evidence of acute deep vein thrombus visualized in the left upper extremity. Subclavian stent is noted and appears patent. There is a known occluded dialysis access noted.  Additional Findings: Technically difficult exam due to IV lines, bandages, and patient's positioning.  Imaging & Doppler Findings:  Right               Compressible Thrombus        Flow Internal Jugular        Yes        None   Spontaneous/Phasic Subclavian              Yes        None Subclavian Proximal     Yes        None   Spontaneous/Phasic  Subclavian Mid          Yes        None Subclavian Distal       Yes        None   Spontaneous/Phasic Axillary                Yes        None       Pulsatile Brachial                Yes        None Cephalic                Yes        None Basilic                 Yes        None  Left                Compress Thrombus        Flow Internal Jugular      Yes      None       Pulsatile Subclavian            Yes      None Subclavian Proximal   Yes      None       Pulsatile Subclavian Mid        Yes      None       Pulsatile Subclavian Distal     Yes      None       Pulsatile Axillary              Yes      None   Spontaneous/Phasic Brachial              Yes      None Cephalic              Yes      None Basilic               Yes      None  [id number] Mini Busby MD, RPVI Electronically signed by [id number] Mini Busby MD, RPVI on 1/25/2024 at 4:06:13 PM  ** Final **     ECG 12 lead    Result Date: 1/24/2024  Sinus tachycardia  with 1st degree AV block Right bundle branch block Possible Lateral infarct , age undetermined Abnormal ECG Confirmed by Yesika Nj ([id number]) on 1/24/2024 6:54:45 AM    XR tibia fibula right 2 views    Result Date: 1/22/2024  Interpreted By:  Real Mendieta, STUDY: XR TIBIA FIBULA RIGHT 2 VIEWS; 1/22/2024 2:15 pm   INDICATION: Signs/Symptoms:evaluate source of infection, osteomyelitis;   COMPARISON: None available.   ACCESSION NUMBER(S): [id number]   ORDERING CLINICIAN: BAN GIFFORD   TECHNIQUE: Views: AP and Lateral of the right tibia and fibula   FINDINGS: RESULT: There is no evidence for fracture or dislocation. Tricompartmental degenerative changes of the right knee. The tibia and fibula appear intact without bony erosion or evidence for osteomyelitis. No other bony or soft tissue abnormality is identified. No subcutaneous gas is noted.       No evidence for acute osseous abnormality. No bony erosion to suggest osteomyelitis.   Signed by: Real Mendieta 1/22/2024 3:15 PM Dictation workstation:    [id number]    XR ankle right 3+ views    Result Date: 1/22/2024  Interpreted By:  Real Mendieta, STUDY: XR ANKLE RIGHT 3+ VIEWS; 1/22/2024 2:15 pm   INDICATION: Signs/Symptoms:Evuluate source of infection, osteomyelitis;   COMPARISON: None available.   ACCESSION NUMBER(S): [id number]   ORDERING CLINICIAN: BAN GIFFORD   TECHNIQUE: Views: AP, Lateral, Oblique, right ankle   FINDINGS: RESULT: There is no evidence for fracture or dislocation. The ankle mortise is intact. Joint spaces appear adequately maintained. No bony erosion to suggest osteomyelitis. No bone lesion or soft tissue abnormality is identified. No subcutaneous gas is seen.       No evidence for acute osseous abnormality. No bony erosion to suggest osteomyelitis.   Signed by: Real Mendieta 1/22/2024 3:14 PM Dictation workstation:   [id number]    Transthoracic Echo (TTE) Complete    Result Date: 1/22/2024           [place] [address]            Phone [phone] TRANSTHORACIC ECHOCARDIOGRAM REPORT  Patient Name:      BASIA Gray Physician:   [id number] [facility]  Study Date:        1/22/2024           Ordering Provider:   [id number] BERMAN MRN/PID:           [id number]            Fellow: Accession#:        [id number]        Nurse: Date of Birth/Age: 1950 / 73 years Sonographer:         Tabatha Page RDCS Gender:            F                   Additional Staff: Height:            157.00 cm           Admit Date: Weight:            75.00 kg            Admission Status:    Inpatient - Routine BSA:               1.76 m2             Department Location: [facility] Blood Pressure: 88 /49 mmHg Study Type:    TRANSTHORACIC ECHO (TTE) COMPLETE Diagnosis/ICD: Chronic combined systolic (congestive) and diastolic (congestive)                heart failure (CHF)-I50.42; Sepsis, unspecified organism-A41.9 Indication:    heart  failure,septic shock CPT Codes:     Echo Complete w Full Doppler-[id number] Patient History: BMI:               Obese >30 Pertinent History: Sepsis, PVD, A-Fib, CVA, Cancer and HTN. breast                    prosthesis/ca, ESRD,anemia,septic shock,heart failure. Study Detail: The following Echo studies were performed: 2D, M-Mode, Doppler and               color flow. Technically challenging study due to prosthesis,               prominent lung artifact and body habitus.  PHYSICIAN INTERPRETATION: Left Ventricle: Left ventricular systolic function is normal, with an estimated ejection fraction of 70%. There are no regional wall motion abnormalities. The left ventricular cavity size is normal. Left ventricular diastolic filling was indeterminate. Left Atrium: The left atrium is moderately dilated. Right Ventricle: The right ventricle is normal in size. There is normal right ventricular global systolic function. Right Atrium: The right atrium is normal in size. Aortic Valve: The aortic valve is trileaflet. There is no evidence of aortic valve regurgitation. The peak instantaneous gradient of the aortic valve is 2.8 mmHg. Mitral Valve: The mitral valve is normal in structure. There is no evidence of mitral valve regurgitation. Tricuspid Valve: The tricuspid valve is structurally normal. There is trace tricuspid regurgitation. Pulmonic Valve: The pulmonic valve is not well visualized. The pulmonic valve regurgitation was not well visualized. Pericardium: There is no pericardial effusion noted. Aorta: The aortic root is normal.  CONCLUSIONS:  1. Left ventricular systolic function is normal with a 70% estimated ejection fraction.  2. The left atrium is moderately dilated.  3. Left ventricular diastolic filling indeterminate. QUANTITATIVE DATA SUMMARY: 2D MEASUREMENTS:                          Normal Ranges: LAs:           4.50 cm   (2.7-4.0cm) IVSd:          0.61 cm   (0.6-1.1cm) LVPWd:         0.88 cm   (0.6-1.1cm) LVIDd:          3.66 cm   (3.9-5.9cm) LV Mass Index: 41.9 g/m2 LV SYSTOLIC FUNCTION BY 2D PLANIMETRY (MOD):                     Normal Ranges: EF-A4C View: 68.3 % (>=55%) LV DIASTOLIC FUNCTION:                     Normal Ranges: MV Peak E: 1.19 m/s (0.7-1.2 m/s) MV Peak A: 0.90 m/s (0.42-0.7 m/s) E/A Ratio: 1.32     (1.0-2.2) MITRAL VALVE:                 Normal Ranges: MV DT: 192 msec (150-240msec) AORTIC VALVE:                         Normal Ranges: AoV Vmax:      0.84 m/s (<=1.7m/s) AoV Peak P.8 mmHg (<20mmHg) LVOT Max Shimon:  0.47 m/s (<=1.1m/s) LVOT Diameter: 1.90 cm  (1.8-2.4cm) AoV Area,Vmax: 1.57 cm2 (2.5-4.5cm2) TRICUSPID VALVE/RVSP:                   Normal Ranges: IVC Diam: 1.05 cm  [id number] [facility]  Electronically signed on 2024 at 2:53:04 PM  ** Final **     XR foot left 3+ views    Result Date: 2024  Interpreted By:  Real Mendieta, STUDY: XR FOOT LEFT 1-2 VIEWS; 2024 4:15 pm   INDICATION: Signs/Symptoms:osteomyelitis. Pain   COMPARISON: 2023   ACCESSION NUMBER(S): [id number]   ORDERING CLINICIAN: BAN GIFFORD   TECHNIQUE: Views:  AP, Lat, Oblique, left foot   FINDINGS: RESULT: There is no evidence for fracture or dislocation. 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 Mild hammertoe deformities. Joint spaces appear adequately maintained. Soft tissue ulceration of the posterior aspect of the heel however no evidence for bony erosion to suggest osteomyelitis.       No evidence for acute fracture or acute bony erosion to suggest osteomyelitis. Chronic changes as described.   Signed by: Real Mendieta 2024 7:15 PM Dictation workstation:   [id number]    CT chest abdomen pelvis wo IV contrast    Result Date: 2024  Interpreted By:  Maria Garcia, STUDY: CT CHEST ABDOMEN PELVIS WO CONTRAST;  2024 11:42 pm   INDICATION: Mental status change. Elevated white blood cell count with infectious workup.   COMPARISON: 2023   ACCESSION NUMBER(S):  [id number]   ORDERING CLINICIAN: BAN GIFFORD   TECHNIQUE: CT of the chest, abdomen and pelvis was performed with no oral or intravenous contrast administered. Sagittal and coronal reformations were completed by the technologist at the acquisition scanner.   All CT examinations are performed with 1 or more of the following dose reduction techniques: Automated exposure control, adjustment of mA and/or kv according to patient's size, or use of iterative reconstruction techniques.   FINDINGS: Please note that the study is limited without intravenous contrast.   CHEST: Bilateral pleural effusions are present with right effusion moderately small in size and with the left effusion moderately small in size as well. There is shift of the heart and mediastinum to the left of midline due to atelectasis of the left upper lobe. There is consolidation throughout left lower lobe with air bronchograms noted. On the right, there is compressive atelectasis seen posteriorly in the right lower lobe.   There is mild reactive mediastinal adenopathy seen at this time with mediastinal lymph nodes increased in size since prior study. Several of these mediastinal nodes are at the upper limits of normal measuring 8 mm in short axis diameter.   No pericardial effusion is present. The cardiac size is normal with mitral annulus calcification.   There has been prior bilateral mastectomy with reconstruction of the left breast with implant placement. Surgical clips are visible within the left axilla. Stent grafts are visible within the left upper extremity and within the left innominate, subclavian as well as axillary veins.   A peripherally calcified 1.8 cm nodule arises from the lower pole of left thyroid lobe.   ABDOMEN:   LIVER: Unremarkable.   BILE DUCTS: No intrahepatic or extrahepatic biliary ductal dilatation is seen.   GALLBLADDER: Cholelithiasis is observed with some calcification of the gallbladder wall demonstrated as well. No  gallbladder wall thickening or pericholecystic fluid is evident.   PANCREAS: Unremarkable   SPLEEN: Unremarkable   ADRENAL GLANDS: Unremarkable   KIDNEYS AND URETERS: Kidneys are small in size with extensive renal artery calcification demonstrated.   PELVIS:   BLADDER: Amos catheter is present within the decompressed urinary bladder.   REPRODUCTIVE ORGANS: Calcification of the arcuate arteries within the uterus is seen. There is no uterine enlargement or adnexal mass.   BOWEL: A rectal balloon is seen. There is no abnormal distention of the intestinal tract.   VESSELS: There is atherosclerosis of the thoracoabdominal aorta and iliac arteries with calcification in the walls of the celiac, splenic, hepatic, and mesenteric arteries.   PERITONEUM/RETROPERITONEUM/LYMPH NODES: There is a minimal amount of ascites seen about the liver and spleen with mild presacral edema noted.   ABDOMINAL WALL: There is anasarca involving the soft tissues of the abdomen and pelvis. Postoperative change from ventral hernia repair is seen.   BONES: Bilateral sacroiliitis is seen. There is lumbar dextroscoliosis. Chronic rotator cuff tear is present bilaterally with osteoarthritis of both shoulders, right greater than left.       Chest 1.  Moderately small bilateral pleural effusions with left upper lobe atelectasis and compressive atelectasis seen posteriorly in right lower lobe. A left lower lobe pneumonia is identified. There is extensive airspace consolidation within left lower lobe with air bronchograms observed. Mild mediastinal reactive adenopathy is present as well.   Abdomen-Pelvis 1.  Cholelithiasis without evidence of cholecystitis. 2. Small amount of ascites with mild presacral edema and anasarca within the soft tissues of the abdominal wall. 3. Renal atrophy with extensive vascular calcifications.     MACRO: None   Signed by: Maria Garcia 1/21/2024 8:30 AM Dictation workstation:   [id number]    CT head wo IV contrast    Result  Date: 1/21/2024  Interpreted By:  Maria Garcia, STUDY: CT HEAD WO IV CONTRAST 1/20/2024 11:42 pm   INDICATION: Signs/Symptoms:mental status changes   COMPARISON: 12/11/2023   ACCESSION NUMBER(S): [id number]   ORDERING CLINICIAN: BAN GIFFORD   TECHNIQUE: Unenhanced axial images of the brain are completed.   All CT examinations are performed with 1 or more of the following dose reduction techniques: Automated exposure control, adjustment of mA and/or kv according to patient's size, or use of iterative reconstruction techniques.   FINDINGS: Helical unenhanced axial images of the brain demonstrate a mild to moderate degree of ventricular enlargement with proportionate widening of the sulci and sylvian fissures. There is no midline shift, mass effect, extra-axial fluid collection, or acute intracranial hemorrhage. There is diminished density seen in the periventricular white matter indicating chronic microvascular ischemic disease. There is calcified plaque seen within the distal vertebral and internal carotid arteries bilaterally. No calvarial abnormality is seen.       Atrophy and chronic microvascular ischemic disease without acute intracranial process.   Signed by: Maria Garcia 1/21/2024 8:09 AM Dictation workstation:   [id number]    XR chest 1 view    Result Date: 1/20/2024  Interpreted By:  Brendon Perez, STUDY: XR CHEST 1 VIEW; 1/20/2024 5:03 pm   INDICATION: CLINICAL INFORMATION: Signs/Symptoms:Insertion right IJ central line, also left thoracentesis.   COMPARISON: 01/19/2024 at 1338 hours   ACCESSION NUMBER(S): [id number]   ORDERING CLINICIAN: BOZENA ANTONIO   TECHNIQUE: Portable chest one view.   FINDINGS: The cardiac size is indeterminate in view of the AP projection. There is no change in the right-sided dual port central venous catheter. There is a new right internal jugular venous catheter present with the tip at approximately same level as the dual port central venous catheter, overlying the mid to lower  right atrium. There is no evidence for pneumothorax. Patient has a history of left thoracentesis without evidence for pneumothorax on the left. There is bilateral basilar alveolar infiltrate and effusions. Left effusion is decreased compared to the study from 1 day earlier.   Surgical clips are identified within left chest wall. Endovascular stent is identified in the distribution of the left subclavian artery.       1. Status post right-sided central venous catheter placement as described above with the tip overlying the mid to caudal aspect of the right atrium. There is no evidence for pneumothorax. 2. No evidence for left-sided pneumothorax after left-sided thoracentesis. Decrease in the left effusion. 3. Bilateral basilar infiltrates and effusions are present. Follow-up to assure complete clearing is suggested.   MACRO: none   Signed by: Brendon Perez 1/20/2024 5:11 PM Dictation workstation:   [id number]    XR chest 1 view    Result Date: 1/19/2024  Interpreted By:  Real Mendieta, STUDY: XR CHEST 1 VIEW; 1/19/2024 1:38 pm   INDICATION: Signs/Symptoms:dyspnea.   COMPARISON: 12/26/2023   ACCESSION NUMBER(S): [id number]   ORDERING CLINICIAN: EMELY GOLDSMITH   TECHNIQUE: 1 view of the chest was performed.   FINDINGS: There may be a minimal right pleural effusion. Slight prominence of the interstitium otherwise the right lung is clear. Improved appearance of the left lung with improved aeration of the right upper lobe. There is opacification of the left lower lobe possibly due to large pleural effusion which is similar to the prior study. No pneumothorax. Right-sided dual lumen central line catheter with tip at the proximal atrium. The cardiomediastinal silhouette is within normal limits. Left-sided subclavian stents are again noted.       Improved aeration and appearance of the left lung superiorly however there is a persistent large left pleural effusion and opacification of the left lower lobe.   Signed by:  Real Mendieta 1/19/2024 1:44 PM Dictation workstation:   [id number]      I/O:    Intake/Output Summary (Last 24 hours) at 2/1/2024 1451  Last data filed at 2/1/2024 1300  Gross per 24 hour   Intake 503.79 ml   Output 100 ml   Net 403.79 ml         I have reviewed all medications, laboratory results, and imaging pertinent for today's encounter    Assessment/Plan:    I am currently managing this critically ill patient for the following problems:  #Septic Shock   #ESRD   #Chronic Hypotension   #Recurrent Pleural Effusions   #Vasculopathy   #Multiple Wounds with and with out infections  #Acute Metabolic Encephalopathy  #Metabolic lactic acidosis, resolved   #Hx of recurrent C. Diff infection  #Hx of Atrial fibrillation   #Hx of endocarditis   #Hx of T2 DM   #Hx of anemia of chronic disease     Neuro/Psych/Pain Ctrl/Sedation:  #Acute Metabolic Encephalopathy, resolving   -Pain Control: Tylenol for mild  -CAM Assessment every shift, neuro assessments q4   -Palliative on consult  -Continue home Lexapro and gabapentin  -Sleep/wake cycle hygiene  -Delirium precaution    Respiratory/ENT:  #Recurrent Pleural Effusions   -Currently on 2L NC, denies any shortness of breath  -Continuous pulse oximetry, maintain SpO2 >90%  -Thoracentesis 1/20 over 1L of hazy straw colored fluid, Lights criteria suggest Transudative    -Aggressive pulmonary/bronchial hygiene     Cardiovascular:  #Septic Shock  #Chronic Hypotension   #Vasculopathy   #Hx of Atrial fibrillation   #Hx of endocarditis    -Levophed titrate to maintain SBP > 70, plan to wean levo off when able  -Home midodrine 15 mg TID when mental status is appropriate   -Continue Fludrocortisone 0.1 TID  -Continue home eliquis   -Continuous cardiac monitoring    GI:  -Regular diet assess mental status before feeding, adding in Magic cup TID and Christian BID   -Flexacil for stools   -BR with senna/colace PRN   -Protonix daily   -Bilirubin within normal limits at 1.1     Renal/Volume  Status (Intra & Extravascular):  #ESRD   #Metabolic lactic acidosis, resolved   -Nephrology following for dialysis needs, recommending M/W/F  -Elevated Lactate 2/2 hypoperfusion -resolved  -Conservative fluid management  -Daily BMP, Replete electrolytes as indicated for ESRD      Endocrine  #Hx of T2 DM   -POCT glucose AC/HS  -SSI AC/HS    Infectious Disease:  #Multiple Wounds with and with out infections  #Hx of recurrent C. Diff infection  #Hx of endocarditis   -Doxycycline 100 mg every day for suppressive therapy   -Vancomycin PO for C. Diff prophylaxis   -ID following   -C. Diff and MRSA PCR negative   -Blood cultures from 1/19, negative   -Urine culture from 1/9, positive for proteus mirabilis     Heme/Onc:  #Hx of anemia of chronic disease   -monitor for s/s anemia  -transfuse for hgb <7  -daily CBC  -Duplex upper extremity US negative for UE DVT    Derm/MSK:  #Multiple Wounds with and with out infections  -Surgery consulted   -Wound care consulted for dressing other wounds   -Podiatry consulted  -XR Left ankle not suspicious for osteomyelitis   -XR Right ankle and tib/fib not suspicious for osteomyelitis   -Continue Uloric for gout prophylaxis   -padded pressure points  -ICU skin protocol    Ethics/Code Status:  Full Code     :  DVT Prophylaxis: home eliquis   GI Prophylaxis: PPI  Bowel Regimen: Senna  Diet: Regular   CVC: Right internal jugular removed 1/29   [place]: Right radial   Amos: None   Restraints: n/a  Dispo: remain in the ICU     Critical Care Time:  50 minutes spent in preparing to see patient (I.e. review of medical records), evaluation of diagnostics (I.e. labs, imaging, etc.), documentation, discussing plan of care with patient/ family/ caregiver, and/ or coordination of care with multidisciplinary team. Time does not include completion of procedure time.        Pt discussed with Dr. Mary Lou Livingston, PA-C  Pulmonary and Critical Care Medicine   [facility]

## 2024-02-01 NOTE — PROGRESS NOTES
Patient not medically clear. Patient on pressors. Patient and family to have hospice meeting on 2/2/2024 at 1730. If patient does not sign with hospice then patient will likely need LTACH. Precert is needed for LTACH. Will follow.     UPDATE 1791: [facility] spoke to patient and daughter, Meka, and the agree with [facility]. [facility] sent referral, waiting on acceptance. Will follow.     **PATIENT DOES NOT HAVE A SAFE DISCHARGE PLAN      Shauna Jensen RN

## 2024-02-01 NOTE — CARE PLAN
Problem: Respiratory  Goal: Clear secretions with interventions this shift  1/31/2024 2024 by Jimenez Murguia RRT  Outcome: Progressing  1/31/2024 2018 by Jimenez Murguia RRT  Outcome: Progressing  Goal: Minimize anxiety/maximize coping throughout shift  1/31/2024 2024 by Jimenez Murguia RRT  Outcome: Progressing  1/31/2024 2018 by Jimenez Murguia RRT  Outcome: Progressing  Goal: Minimal/no exertional discomfort or dyspnea this shift  1/31/2024 2024 by Jimenez Murguia RRT  Outcome: Progressing  1/31/2024 2018 by Jimenez Murguia RRT  Outcome: Progressing  Goal: No signs of respiratory distress (eg. Use of accessory muscles. Peds grunting)  1/31/2024 2024 by Jimenez Murguia RRT  Outcome: Progressing  1/31/2024 2018 by Jimenez Murguia RRT  Outcome: Progressing  Goal: Patent airway maintained this shift  1/31/2024 2024 by Jimenez Murguia RRT  Outcome: Progressing  1/31/2024 2018 by Jimenez Murguia RRT  Outcome: Progressing  Goal: Tolerate pulmonary toileting this shift  1/31/2024 2024 by Jimenez Murguia RRT  Outcome: Progressing  1/31/2024 2018 by Jimenez Murguia RRT  Outcome: Progressing  Goal: Verbalize decreased shortness of breath this shift  1/31/2024 2024 by Jimenez Murguia RRT  Outcome: Progressing  1/31/2024 2018 by Jimenez Murguia RRT  Outcome: Progressing  Goal: Wean oxygen to maintain O2 saturation per order/standard this shift  1/31/2024 2024 by Jimenez Murguia RRT  Outcome: Progressing  1/31/2024 2018 by Jimenez Murguia RRT  Outcome: Progressing  Goal: Increase self care and/or family involvement in next 24 hours  Outcome: Progressing

## 2024-02-02 NOTE — PROGRESS NOTES
Ayanna Gross is a 73 y.o. female on day 14 of admission presenting with Septic shock (CMS/HCC).    Subjective   Interval History:   Afebrile, no chills  Remains on 2 liters oxygen  Hospice meeting planned for later today  Discussed with nursing      Objective   Range of Vitals (last 24 hours)  Heart Rate:  []   Temp:  [35.6 °C (96.1 °F)-36.5 °C (97.7 °F)]   Resp:  [9-27]   BP: ()/()   Weight:  [101 kg (221 lb 12.5 oz)]   SpO2:  [90 %-97 %]   Daily Weight  02/02/24 : 101 kg (221 lb 12.5 oz)    Body mass index is 40.56 kg/m².    Physical Exam  Constitutional:       Appearance: Normal appearance.   HENT:      Head: Normocephalic and atraumatic.      Nose: Nose normal.   Eyes:      Extraocular Movements: Extraocular movements intact.      Conjunctiva/sclera: Conjunctivae normal.   Cardiovascular:      Heart sounds: Normal heart sounds, S1 normal and S2 normal.   Pulmonary:      Breath sounds: Decreased breath sounds present.   Abdominal:      General: Bowel sounds are normal.      Palpations: Abdomen is soft.   Musculoskeletal:      Cervical back: Normal range of motion and neck supple.   Skin:     Comments: Stage III sacral ulcer, bilateral posterior heel eschar -photos examined  Neurological:      Mental Status: She is alert.        Antibiotics  aspirin tablet 325 mg  acetaminophen (Tylenol) tablet 650 mg  cefepime (Maxipime) 1 g in dextrose 5 % 50 mL IV  sodium chloride 0.9 % bolus 2,229 mL  apixaban (Eliquis) tablet 2.5 mg  escitalopram (Lexapro) tablet 10 mg  febuxostat (Uloric) tablet 40 mg  fenofibrate (Triglide) tablet 160 mg  gabapentin (Neurontin) capsule 100 mg  midodrine (Proamatine) tablet 15 mg  nystatin (Mycostatin) 100,000 unit/gram powder  oxyCODONE-acetaminophen (Percocet) 5-325 mg per tablet 1 tablet  simvastatin (Zocor) tablet 20 mg  piperacillin-tazobactam-dextrose (Zosyn) IV 2.25 g  vancomycin (Vancocin) capsule 125 mg  oxygen (O2) therapy  dextrose 50 % injection 25 g  glucagon  (Glucagen) injection 1 mg  dextrose 10 % in water (D10W) infusion  insulin lispro (HumaLOG) injection 0-10 Units  nystatin (Mycostatin) 100,000 unit/gram powder 1 Application  vancomycin-diluent combo no.1 (Xellia) IVPB 1,750 mg  piperacillin-tazobactam-dextrose (Zosyn) IV 2.25 g  sodium chloride 0.9 % bolus 500 mL  norepinephrine (Levophed) 8 mg in dextrose 5% 250 mL (0.032 mg/mL) infusion (premix)  vancomycin (Vancocin) placeholder  pantoprazole (ProtoNix) EC tablet 40 mg  pantoprazole (ProtoNix) injection 40 mg  sennosides-docusate sodium (Judy-Colace) 8.6-50 mg per tablet 1 tablet  doxycycline (Vibramycin) in dextrose 5 % in water (D5W) 100 mL  mg  LORazepam (Ativan) injection 2 mg  magnesium sulfate IV 2 g  zinc oxide 20 % ointment 1 Application  nystatin (Mycostatin) cream  norepinephrine (Levophed) 8 mg in dextrose 5% 250 mL (0.032 mg/mL) infusion (premix)  heparin 1,000 unit/mL injection 2,000 Units  heparin 1,000 unit/mL injection 2,000 Units  nystatin (Mycostatin) ointment  acetaminophen (Tylenol) oral liquid 1,000 mg  albumin human 25 % solution 12.5 g  perflutren lipid microspheres (Definity) injection 0.5-10 mL of dilution  sulfur hexafluoride microsphr (Lumason) injection 24.28 mg  perflutren protein A microsphere (Optison) injection 0.5 mL  ipratropium-albuteroL (Duo-Neb) 0.5-2.5 mg/3 mL nebulizer solution 3 mL  sodium chloride 3 % nebulizer solution 3 mL  vancomycin-diluent combo no.1 (Xellia) IVPB 750 mg  sennosides-docusate sodium (Judy-Colace) 8.6-50 mg per tablet 1 tablet  acetaminophen (Tylenol) tablet 650 mg  doxycycline (Vibramycin) capsule 100 mg  magnesium oxide (Mag-Ox) tablet 400 mg  vasopressin (Vasostrict) 0.2 unit/mL infusion  norepinephrine (Levophed) 8 mg in dextrose 5% 250 mL (0.032 mg/mL) infusion (premix)  heparin 1,000 unit/mL injection 2,000 Units  heparin 1,000 unit/mL injection 2,000 Units  albumin human 25 % solution 12.5 g  vancomycin (Xellia) 1 g in 200 mL (Xellia)  IVPB 1 g  ipratropium-albuteroL (Duo-Neb) 0.5-2.5 mg/3 mL nebulizer solution 3 mL  sodium chloride 3 % nebulizer solution 3 mL  albumin human 5 % infusion 12.5 g  heparin 1,000 unit/mL injection 2,000 Units  heparin 1,000 unit/mL injection 2,000 Units  vancomycin (Vancocin) capsule 125 mg  albumin human 25 % solution 12.5 g  heparin 1,000 unit/mL injection 2,000 Units  heparin 1,000 unit/mL injection 2,000 Units  ipratropium-albuteroL (Duo-Neb) 0.5-2.5 mg/3 mL nebulizer solution 3 mL  lidocaine (Xylocaine) 10 mg/mL (1 %) injection 50 mg  sodium phosphate 15 mmol in sodium chloride 0.9% 250 mL IV  sod phos di, mono-K phos mono (K Phos Neutral) tablet 250 mg  potassium, sodium phosphates (Phos-NaK) 280-160-250 mg packet 1 packet  doxycycline (Vibramycin) capsule 100 mg  fludrocortisone (Florinef) tablet 0.1 mg      Relevant Results  Labs  Results from last 72 hours   Lab Units 02/02/24 0400 02/01/24 0455 01/31/24  0423   WBC AUTO x10*3/uL 13.6* 15.5* 15.9*   HEMOGLOBIN g/dL 9.3* 9.2* 9.7*   HEMATOCRIT % 28.9* 29.6* 30.4*   PLATELETS AUTO x10*3/uL 227 204 200       Results from last 72 hours   Lab Units 02/02/24 0400 02/01/24 0455 01/31/24  0423   SODIUM mmol/L 131* 131* 134   POTASSIUM mmol/L 4.2 4.2 4.1   CHLORIDE mmol/L 98 99 98   CO2 mmol/L 20* 21* 21*   BUN mg/dL 15 10 11   CREATININE mg/dL 3.00* 2.40* 2.80*   GLUCOSE mg/dL 189* 193* 160*   CALCIUM mg/dL 8.5 8.3* 8.5   ANION GAP mmol/L 13 11 15   EGFR mL/min/1.73m*2 16* 21* 18*   PHOSPHORUS mg/dL 2.9 2.5 2.6       Results from last 72 hours   Lab Units 02/02/24 0400 02/01/24  0455 01/31/24  0423   ALK PHOS U/L 247* 239* 253*   BILIRUBIN TOTAL mg/dL 1.0 1.1 1.4*   PROTEIN TOTAL g/dL 5.1* 4.9* 5.0*   ALT U/L 14 13 15   AST U/L 34 32 38   ALBUMIN g/dL 2.3* 2.3* 2.5*       Estimated Creatinine Clearance: 19.1 mL/min (A) (by C-G formula based on SCr of 3 mg/dL (H)).  C-Reactive Protein   Date Value Ref Range Status   12/25/2023 5.20 (H) 0.00 - 2.00 mg/dL Final      CRP   Date Value Ref Range Status   06/23/2023 19.9 (H) 0 - 2.0 MG/DL Final     Comment:     Performed at [place] [id number]   05/22/2022 1.0 0 - 2.0 MG/DL Final     Comment:     Performed at [place] [id number]     Microbiology  Susceptibility data from last 14 days.  Collected Specimen Info Organism Ampicillin Cefazolin Cefazolin (uncomplicated UTIs only) Ciprofloxacin Gentamicin Piperacillin/Tazobactam Tetracycline Trimethoprim/Sulfamethoxazole   01/19/24 Urine from Indwelling (Amos) Catheter Proteus mirabilis S S S S S S R S     Reviewed   Imaging  Upper extremity venous duplex bilateral    Result Date: 1/25/2024           [place] [address]            Phone [phone]  Vascular Lab Report  [facility] UPPER EXTREMITY VENOUS DUPLEX BILATERAL Patient Name:      BASIA Gray Physician:  [id number] Mini Busby MD, RPVI Study Date:        1/25/2024           Ordering Provider:  [id number] ERI BERMAN MRN/PID:           [id number]            Fellow: Accession#:        [id number]        Technologist:       Dara Parr RVRICHIE Date of Birth/Age: 1950 / 73 years Technologist 2: Gender:            F                   Encounter#:         [id number] Admission Status:  Inpatient           Location Performed: [facility]  Diagnosis/ICD: Right arm swelling-M79.89; Left arm swelling-M79.89 CPT Codes:     48976 Peripheral venous duplex scan for DVT complete  CONCLUSIONS:  Right Upper Venous: No evidence of acute deep vein thrombus visualized in the right upper extremity. Internal jugular vein was visualized in segments due to IV lines and bandages. Left Upper Venous: No evidence of acute deep vein thrombus visualized in the left upper extremity. Subclavian stent is noted and appears patent. There is a known occluded dialysis access noted.   Additional Findings: Technically difficult exam due to IV lines, bandages, and patient's positioning.  Imaging & Doppler Findings:  Right               Compressible Thrombus        Flow Internal Jugular        Yes        None   Spontaneous/Phasic Subclavian              Yes        None Subclavian Proximal     Yes        None   Spontaneous/Phasic Subclavian Mid          Yes        None Subclavian Distal       Yes        None   Spontaneous/Phasic Axillary                Yes        None       Pulsatile Brachial                Yes        None Cephalic                Yes        None Basilic                 Yes        None  Left                Compress Thrombus        Flow Internal Jugular      Yes      None       Pulsatile Subclavian            Yes      None Subclavian Proximal   Yes      None       Pulsatile Subclavian Mid        Yes      None       Pulsatile Subclavian Distal     Yes      None       Pulsatile Axillary              Yes      None   Spontaneous/Phasic Brachial              Yes      None Cephalic              Yes      None Basilic               Yes      None  [id number] Mini Busby MD, RPVI Electronically signed by [id number] Mini Busby MD, RPVI on 1/25/2024 at 4:06:13 PM  ** Final **     ECG 12 lead    Result Date: 1/24/2024  Sinus tachycardia  with 1st degree AV block Right bundle branch block Possible Lateral infarct , age undetermined Abnormal ECG Confirmed by Yesika Nj ([id number]) on 1/24/2024 6:54:45 AM    XR tibia fibula right 2 views    Result Date: 1/22/2024  Interpreted By:  Real Mendieta, STUDY: XR TIBIA FIBULA RIGHT 2 VIEWS; 1/22/2024 2:15 pm   INDICATION: Signs/Symptoms:evaluate source of infection, osteomyelitis;   COMPARISON: None available.   ACCESSION NUMBER(S): [id number]   ORDERING CLINICIAN: BAN GIFFORD   TECHNIQUE: Views: AP and Lateral of the right tibia and fibula   FINDINGS: RESULT: There is no evidence for fracture or dislocation. Tricompartmental degenerative changes of the  right knee. The tibia and fibula appear intact without bony erosion or evidence for osteomyelitis. No other bony or soft tissue abnormality is identified. No subcutaneous gas is noted.       No evidence for acute osseous abnormality. No bony erosion to suggest osteomyelitis.   Signed by: Real Mendieta 1/22/2024 3:15 PM Dictation workstation:   [id number]    XR ankle right 3+ views    Result Date: 1/22/2024  Interpreted By:  Real Mendieta, STUDY: XR ANKLE RIGHT 3+ VIEWS; 1/22/2024 2:15 pm   INDICATION: Signs/Symptoms:Evuluate source of infection, osteomyelitis;   COMPARISON: None available.   ACCESSION NUMBER(S): [id number]   ORDERING CLINICIAN: BAN GIFFORD   TECHNIQUE: Views: AP, Lateral, Oblique, right ankle   FINDINGS: RESULT: There is no evidence for fracture or dislocation. The ankle mortise is intact. Joint spaces appear adequately maintained. No bony erosion to suggest osteomyelitis. No bone lesion or soft tissue abnormality is identified. No subcutaneous gas is seen.       No evidence for acute osseous abnormality. No bony erosion to suggest osteomyelitis.   Signed by: Real Mendieta 1/22/2024 3:14 PM Dictation workstation:   [id number]    Transthoracic Echo (TTE) Complete    Result Date: 1/22/2024           [place] [address]            Phone [phone] TRANSTHORACIC ECHOCARDIOGRAM REPORT  Patient Name:      BASIA FREIDA Gray Physician:   [id number] [facility] Study Date:        1/22/2024           Ordering Provider:   [id number] ERI BERMAN MRN/PID:           [id number]            Fellow: Accession#:        [id number]        Nurse: Date of Birth/Age: 1950 / 73 years Sonographer:         Tabatha Page RDCS Gender:            F                   Additional Staff: Height:            157.00 cm           Admit Date: Weight:            75.00 kg            Admission Status:     Inpatient - Routine BSA:               1.76 m2             Department Location: [facility] ICU Blood Pressure: 88 /49 mmHg Study Type:    TRANSTHORACIC ECHO (TTE) COMPLETE Diagnosis/ICD: Chronic combined systolic (congestive) and diastolic (congestive)                heart failure (CHF)-I50.42; Sepsis, unspecified organism-A41.9 Indication:    heart failure,septic shock CPT Codes:     Echo Complete w Full Doppler-[id number] Patient History: BMI:               Obese >30 Pertinent History: Sepsis, PVD, A-Fib, CVA, Cancer and HTN. breast                    prosthesis/ca, ESRD,anemia,septic shock,heart failure. Study Detail: The following Echo studies were performed: 2D, M-Mode, Doppler and               color flow. Technically challenging study due to prosthesis,               prominent lung artifact and body habitus.  PHYSICIAN INTERPRETATION: Left Ventricle: Left ventricular systolic function is normal, with an estimated ejection fraction of 70%. There are no regional wall motion abnormalities. The left ventricular cavity size is normal. Left ventricular diastolic filling was indeterminate. Left Atrium: The left atrium is moderately dilated. Right Ventricle: The right ventricle is normal in size. There is normal right ventricular global systolic function. Right Atrium: The right atrium is normal in size. Aortic Valve: The aortic valve is trileaflet. There is no evidence of aortic valve regurgitation. The peak instantaneous gradient of the aortic valve is 2.8 mmHg. Mitral Valve: The mitral valve is normal in structure. There is no evidence of mitral valve regurgitation. Tricuspid Valve: The tricuspid valve is structurally normal. There is trace tricuspid regurgitation. Pulmonic Valve: The pulmonic valve is not well visualized. The pulmonic valve regurgitation was not well visualized. Pericardium: There is no pericardial effusion noted. Aorta: The aortic root is normal.  CONCLUSIONS:  1. Left ventricular systolic function is  normal with a 70% estimated ejection fraction.  2. The left atrium is moderately dilated.  3. Left ventricular diastolic filling indeterminate. QUANTITATIVE DATA SUMMARY: 2D MEASUREMENTS:                          Normal Ranges: LAs:           4.50 cm   (2.7-4.0cm) IVSd:          0.61 cm   (0.6-1.1cm) LVPWd:         0.88 cm   (0.6-1.1cm) LVIDd:         3.66 cm   (3.9-5.9cm) LV Mass Index: 41.9 g/m2 LV SYSTOLIC FUNCTION BY 2D PLANIMETRY (MOD):                     Normal Ranges: EF-A4C View: 68.3 % (>=55%) LV DIASTOLIC FUNCTION:                     Normal Ranges: MV Peak E: 1.19 m/s (0.7-1.2 m/s) MV Peak A: 0.90 m/s (0.42-0.7 m/s) E/A Ratio: 1.32     (1.0-2.2) MITRAL VALVE:                 Normal Ranges: MV DT: 192 msec (150-240msec) AORTIC VALVE:                         Normal Ranges: AoV Vmax:      0.84 m/s (<=1.7m/s) AoV Peak P.8 mmHg (<20mmHg) LVOT Max Shimon:  0.47 m/s (<=1.1m/s) LVOT Diameter: 1.90 cm  (1.8-2.4cm) AoV Area,Vmax: 1.57 cm2 (2.5-4.5cm2) TRICUSPID VALVE/RVSP:                   Normal Ranges: IVC Diam: 1.05 cm  [id number] [facility] Electronically signed on 2024 at 2:53:04 PM  ** Final **     XR foot left 3+ views    Result Date: 2024  Interpreted By:  Real Mendieta, STUDY: XR FOOT LEFT 1-2 VIEWS; 2024 4:15 pm   INDICATION: Signs/Symptoms:osteomyelitis. Pain   COMPARISON: 2023   ACCESSION NUMBER(S): [id number]   ORDERING CLINICIAN: BAN GIFFORD   TECHNIQUE: Views:  AP, Lat, Oblique, left foot   FINDINGS: RESULT: There is no evidence for fracture or dislocation. 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 Mild hammertoe deformities. Joint spaces appear adequately maintained. Soft tissue ulceration of the posterior aspect of the heel however no evidence for bony erosion to suggest osteomyelitis.       No evidence for acute fracture or acute bony erosion to suggest osteomyelitis. Chronic changes as described.   Signed by: Real Mendieta 2024 7:15  PM Dictation workstation:   [id number]    CT chest abdomen pelvis wo IV contrast    Result Date: 1/21/2024  Interpreted By:  Maria Garcia, STUDY: CT CHEST ABDOMEN PELVIS WO CONTRAST;  1/20/2024 11:42 pm   INDICATION: Mental status change. Elevated white blood cell count with infectious workup.   COMPARISON: 08/20/2023   ACCESSION NUMBER(S): [id number]   ORDERING CLINICIAN: BAN GIFFORD   TECHNIQUE: CT of the chest, abdomen and pelvis was performed with no oral or intravenous contrast administered. Sagittal and coronal reformations were completed by the technologist at the acquisition scanner.   All CT examinations are performed with 1 or more of the following dose reduction techniques: Automated exposure control, adjustment of mA and/or kv according to patient's size, or use of iterative reconstruction techniques.   FINDINGS: Please note that the study is limited without intravenous contrast.   CHEST: Bilateral pleural effusions are present with right effusion moderately small in size and with the left effusion moderately small in size as well. There is shift of the heart and mediastinum to the left of midline due to atelectasis of the left upper lobe. There is consolidation throughout left lower lobe with air bronchograms noted. On the right, there is compressive atelectasis seen posteriorly in the right lower lobe.   There is mild reactive mediastinal adenopathy seen at this time with mediastinal lymph nodes increased in size since prior study. Several of these mediastinal nodes are at the upper limits of normal measuring 8 mm in short axis diameter.   No pericardial effusion is present. The cardiac size is normal with mitral annulus calcification.   There has been prior bilateral mastectomy with reconstruction of the left breast with implant placement. Surgical clips are visible within the left axilla. Stent grafts are visible within the left upper extremity and within the left innominate, subclavian as well as  axillary veins.   A peripherally calcified 1.8 cm nodule arises from the lower pole of left thyroid lobe.   ABDOMEN:   LIVER: Unremarkable.   BILE DUCTS: No intrahepatic or extrahepatic biliary ductal dilatation is seen.   GALLBLADDER: Cholelithiasis is observed with some calcification of the gallbladder wall demonstrated as well. No gallbladder wall thickening or pericholecystic fluid is evident.   PANCREAS: Unremarkable   SPLEEN: Unremarkable   ADRENAL GLANDS: Unremarkable   KIDNEYS AND URETERS: Kidneys are small in size with extensive renal artery calcification demonstrated.   PELVIS:   BLADDER: Amos catheter is present within the decompressed urinary bladder.   REPRODUCTIVE ORGANS: Calcification of the arcuate arteries within the uterus is seen. There is no uterine enlargement or adnexal mass.   BOWEL: A rectal balloon is seen. There is no abnormal distention of the intestinal tract.   VESSELS: There is atherosclerosis of the thoracoabdominal aorta and iliac arteries with calcification in the walls of the celiac, splenic, hepatic, and mesenteric arteries.   PERITONEUM/RETROPERITONEUM/LYMPH NODES: There is a minimal amount of ascites seen about the liver and spleen with mild presacral edema noted.   ABDOMINAL WALL: There is anasarca involving the soft tissues of the abdomen and pelvis. Postoperative change from ventral hernia repair is seen.   BONES: Bilateral sacroiliitis is seen. There is lumbar dextroscoliosis. Chronic rotator cuff tear is present bilaterally with osteoarthritis of both shoulders, right greater than left.       Chest 1.  Moderately small bilateral pleural effusions with left upper lobe atelectasis and compressive atelectasis seen posteriorly in right lower lobe. A left lower lobe pneumonia is identified. There is extensive airspace consolidation within left lower lobe with air bronchograms observed. Mild mediastinal reactive adenopathy is present as well.   Abdomen-Pelvis 1.  Cholelithiasis  without evidence of cholecystitis. 2. Small amount of ascites with mild presacral edema and anasarca within the soft tissues of the abdominal wall. 3. Renal atrophy with extensive vascular calcifications.     MACRO: None   Signed by: Maria Garcia 1/21/2024 8:30 AM Dictation workstation:   [id number]    CT head wo IV contrast    Result Date: 1/21/2024  Interpreted By:  Maria Garcia, STUDY: CT HEAD WO IV CONTRAST 1/20/2024 11:42 pm   INDICATION: Signs/Symptoms:mental status changes   COMPARISON: 12/11/2023   ACCESSION NUMBER(S): [id number]   ORDERING CLINICIAN: BAN GIFFORD   TECHNIQUE: Unenhanced axial images of the brain are completed.   All CT examinations are performed with 1 or more of the following dose reduction techniques: Automated exposure control, adjustment of mA and/or kv according to patient's size, or use of iterative reconstruction techniques.   FINDINGS: Helical unenhanced axial images of the brain demonstrate a mild to moderate degree of ventricular enlargement with proportionate widening of the sulci and sylvian fissures. There is no midline shift, mass effect, extra-axial fluid collection, or acute intracranial hemorrhage. There is diminished density seen in the periventricular white matter indicating chronic microvascular ischemic disease. There is calcified plaque seen within the distal vertebral and internal carotid arteries bilaterally. No calvarial abnormality is seen.       Atrophy and chronic microvascular ischemic disease without acute intracranial process.   Signed by: Maria Garcia 1/21/2024 8:09 AM Dictation workstation:   [id number]    XR chest 1 view    Result Date: 1/20/2024  Interpreted By:  Brendon Perez, STUDY: XR CHEST 1 VIEW; 1/20/2024 5:03 pm   INDICATION: CLINICAL INFORMATION: Signs/Symptoms:Insertion right IJ central line, also left thoracentesis.   COMPARISON: 01/19/2024 at 1338 hours   ACCESSION NUMBER(S): [id number]   ORDERING CLINICIAN: BOZENA ANTONIO   TECHNIQUE: Portable  chest one view.   FINDINGS: The cardiac size is indeterminate in view of the AP projection. There is no change in the right-sided dual port central venous catheter. There is a new right internal jugular venous catheter present with the tip at approximately same level as the dual port central venous catheter, overlying the mid to lower right atrium. There is no evidence for pneumothorax. Patient has a history of left thoracentesis without evidence for pneumothorax on the left. There is bilateral basilar alveolar infiltrate and effusions. Left effusion is decreased compared to the study from 1 day earlier.   Surgical clips are identified within left chest wall. Endovascular stent is identified in the distribution of the left subclavian artery.       1. Status post right-sided central venous catheter placement as described above with the tip overlying the mid to caudal aspect of the right atrium. There is no evidence for pneumothorax. 2. No evidence for left-sided pneumothorax after left-sided thoracentesis. Decrease in the left effusion. 3. Bilateral basilar infiltrates and effusions are present. Follow-up to assure complete clearing is suggested.   MACRO: none   Signed by: Brendon Preez 1/20/2024 5:11 PM Dictation workstation:   [id number]    XR chest 1 view    Result Date: 1/19/2024  Interpreted By:  Real Mendieta, STUDY: XR CHEST 1 VIEW; 1/19/2024 1:38 pm   INDICATION: Signs/Symptoms:dyspnea.   COMPARISON: 12/26/2023   ACCESSION NUMBER(S): [id number]   ORDERING CLINICIAN: EMELY GOLDSMITH   TECHNIQUE: 1 view of the chest was performed.   FINDINGS: There may be a minimal right pleural effusion. Slight prominence of the interstitium otherwise the right lung is clear. Improved appearance of the left lung with improved aeration of the right upper lobe. There is opacification of the left lower lobe possibly due to large pleural effusion which is similar to the prior study. No pneumothorax. Right-sided dual lumen  central line catheter with tip at the proximal atrium. The cardiomediastinal silhouette is within normal limits. Left-sided subclavian stents are again noted.       Improved aeration and appearance of the left lung superiorly however there is a persistent large left pleural effusion and opacification of the left lower lobe.   Signed by: Real Mendieta 1/19/2024 1:44 PM Dictation workstation:   [id number]    Electrocardiogram, 12-lead PRN ACS symptoms    Result Date: 1/7/2024  Sinus rhythm Right bundle branch block Septal infarct (cited on or before 06-DEC-2023) Abnormal ECG When compared with ECG of 06-DEC-2023 13:06, Questionable change in initial forces of Septal leads Confirmed by Herminio Lemus ([id number]) on 1/7/2024 11:04:01 AM     Assessment/Plan     Septic shock-on pressors-resolving  Left-sided pleural effusion   Left lower lobe pneumonia-treated  Proteus urinary tract infection-treated  History of MRSA endocarditis  History of C. 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

## 2024-02-02 NOTE — PROGRESS NOTES
[facility] Critical Care Medicine       Date:  2/2/2024  Patient:  Ayanna Gross  YOB: 1950  MRN:  [id number]   Admit Date:  1/19/2024  ========================================================================================================    Chief Complaint   Patient presents with    Shortness of Breath     Patient with SOB and fever. Is very lethargic. Dialysis MWF but did not go today dt SOB and fever         History of Present Illness:  Ayanna Gross is a 73 y.o. year old female patient with Past Medical History of  atrial fibrillation, end-stage renal disease, C. difficile infection, moderate sized recurrent left pleural effusions, and MRSA bacteremia with mitral valve vegetation admitted to ICU for management of septic shock.      Patient presented with anemia, fever worsening sacral and lower extremity wounds and concerns from the nursing facility for left pneumonia. Patient presents to the emergency department found to be febrile she was hypotensive but responded to 500 cc fluid bolus she was found to have a hemoglobin of 6 she was begun and ordered for a single unit of blood transfusion will actually provide 2 units of packed cells additional IV fluids to complete her volume resuscitation given her fever clinical evidence of hypovolemia and infection with an elevated lactate of 2.3 she received a total of approximately 1800 mL of fluid she is DNR/DNI by advanced directives but ICU level care is acceptable her granddaughter questions disorder but it was confirmed and signed by her  who is her POA.  She will be admitted to the intensive care unit given her frail compromised state and high risk of morbidity and mortality necessitating IV antibiotics fluids blood transfusion and ultimately will receive dialysis likely tomorrow wound care consultation to general surgery has been requested as well as consultation with the intensivist infectious disease and nephrology wounds are cultured C.  difficile is requested she remains on oral vancomycin for suppression given recent C. difficile infection in December she is initiated on pharmacy dosing of vancomycin and Zosyn 2.25 g IV every 6 hours. She continues on Eliquis at this time for DVT prophylaxis as well as atrial fibrillation.       Interval ICU Events:  1/20: Pt missed last dialysis appointment, no signs of urgent need today, but nephrology Dr. Cabello is following. Pt on high requirements of levophed. She is on broad spectrum abx for coverage currently ID Dr. Coulter following. Pt switches from being A&O x 3 and lethargy. Goal today is to get more central access, david, and thoracentesis.      1/21: Pt more lethargic this AM requiring uptitration of her Levophed to 0.28. Diagnostic and therapeutic thoracentesis yesterday over 1L of hazy straw color fluid. Currently still on vanc, zosyn, and doxy for abx. Potential dialysis this today, but no urgency.      1/22: Pt come in and out of lethargic states throughout the day. Still on high doses of levophed this morning. Per nephrologist Dr. Cabello titrate for an SBP goal of 70 and mentation. Left Pleural fluid analysis resulted as transudative per lights criteria. Tablo today per nephrology. ID managing with empiric coverage. Source control may not have been met. Podiatry, wound, and surgery to evaluate multiple wounds over the body.      1/23: Pt received dialysis via tablo yesterday. Very similar status as yesterday from lethargic states to well mentation. Patient remains on Levophed still at 0.36 with empiric coverage with vanc + zosyn & doxy. Increase in patients total bilirubin today 0.9-1.6. Consulting services include: Wound, Vascular, Podiatry, Acute surgery, Palliative, Infectious Disease, and Nephrology.      1/24: Patient stable overnight, norepinephrine weaned down to 0.16 with stable pressures and new goal of systolics > 70     1/25: Patient resting comfortably in bed this morning  requiring 0.05 of Levophed for systolic goal of 70. She is more alert than prior and complains of less pain. She will receive Tablo today with 1.5L of fluid removed per Nephrology.      1/26: Patient resting comfortably in bed this morning requiring 0.06 Levophed and midodrine 15 mg TID. Tablo yesterday with 1.5L removed. Empiric abx stopped 2 days ago. SLP signed off with recommendations.       1/27: Patient resting comfortable in bed this morning requiring 0.06 of levophed and midodrine 15 mg TID. Potential Tablo today. No acute events overnight. Patient remains at baseline.      1/29: No acute events overnight.  Pt resting comfortable in bed this morning requiring 0.04 of levophed and midodrine 15mg TID. Plan for dialysis via tablo today per nephrology.       1/30: Pt requiring increased levophed overnight at .12 and down do .09 this morning.  Dialysis via tablo yesterday with -1L.  Pt started on fludrocortisone 0.1 tid.  Spent extensive time with the patient discussing goals of care and plan for a family meeting tomorrow.      1/31: Pt maintained on 0.09 of levophed overnight.  Plan for dialysis via tablo today.  Continue goals of care discussion with a family meeting scheduled tonight at 1630.       2/1: Pt maintained on levophed overnight.  WBC down trending.  Continue goals of care discussion between LTAC and hospice.      2/2: Patient stable on levo 0.07 overnight, WBC stable, plan to discuss LTAC vs Hospice this PM       Medical History:  Past Medical History:   Diagnosis Date    Asthma     CAD (coronary artery disease)     CHF (congestive heart failure) (CMS/[facility])     Chronic kidney disease on chronic dialysis (CMS/[facility])     Hypertension     Personal history of diseases of the blood and blood-forming organs and certain disorders involving the immune mechanism     History of autoimmune disorder    Sleep apnea     Type 2 diabetes mellitus without complications (CMS/[facility]) 09/15/2022    Diabetes mellitus     Past  Surgical History:   Procedure Laterality Date    CARPAL TUNNEL RELEASE  11/18/2013    Neuroplasty Decompression Median Nerve At Carpal Tunnel    HAND SURGERY  11/18/2013    Hand Surgery                                                                                                                                                          MASTECTOMY, PARTIAL  04/10/2014    Right Breast Partial Mastectomy    MR HEAD ANGIO WO IV CONTRAST  8/29/2023    MR HEAD ANGIO WO IV CONTRAST [facility]    OTHER SURGICAL HISTORY  11/18/2013    Breast Reconstruction With Implant Prosthesis    OTHER SURGICAL HISTORY  11/18/2013    Thyroid Surgery Substernal Thyroidectomy Partial    OTHER SURGICAL HISTORY  11/18/2013    Modified Radical Mastectomy Left Breast    OTHER SURGICAL HISTORY  04/04/2022    Carpal tunnel surgery    OTHER SURGICAL HISTORY  04/04/2022    Foot surgery    OTHER SURGICAL HISTORY  04/04/2022    Hernia repair    SENTINEL LYMPH NODE BIOPSY  04/10/2014    Cost Lymph Node Biopsy    TONSILLECTOMY  11/18/2013    Tonsillectomy    UMBILICAL HERNIA REPAIR  11/18/2013    Umbilical Hernia Repair    US GUIDED PERCUTANEOUS PLACEMENT  6/29/2023    US GUIDED PERCUTANEOUS PLACEMENT [facility]     Medications Prior to Admission   Medication Sig Dispense Refill Last Dose    apixaban (Eliquis) 2.5 mg tablet Take 1 tablet (2.5 mg) by mouth 2 times a day.       blood sugar diagnostic strip 1 x daily e11.65 for 90 days       blood-glucose sensor (Dexcom G6 Sensor) device Check blood sugar when needed and as instructed 6 each 3     doxycycline (Vibramycin) 100 mg capsule Take 1 capsule (100 mg) by mouth once daily. Take with at least 8 ounces (large glass) of water, do not lie down for 30 minutes after Do not start before January 6, 2024.       escitalopram (Lexapro) 10 mg tablet 1 tablet Orally Once a day for 30 day(s)       febuxostat (Uloric) 40 mg tablet TAKE ONE TABLET BY MOUTH EVERY OTHER DAY        fenofibrate (Triglide) 160 mg tablet Take 1 tablet (160 mg) by mouth once daily.       gabapentin (Neurontin) 100 mg capsule Take 1 capsule (100 mg) by mouth 2 times a day.       insulin lispro (HumaLOG) 100 unit/mL injection Inject 0-0.1 mL (0-10 Units) under the skin 3 times a day with meals. Take as directed per insulin instructions.       midodrine (Proamatine) 5 mg tablet Take 3 tablets (15 mg) by mouth 3 times a day with meals.       nystatin (Mycostatin) 100,000 unit/gram powder APPLY 1 GRAM TO SKIN TWO TIMES A DAY 30 g 0     oxyCODONE-acetaminophen (Percocet) 5-325 mg tablet Take 1 tablet by mouth every 12 hours if needed for severe pain (7 - 10). 120 tablet 0     simvastatin (Zocor) 20 mg tablet TAKE 1 TABLET BY MOUTH DAILY 60 tablet 5      Patient has no known allergies.  Social History     Tobacco Use    Smoking status: Never    Smokeless tobacco: Never   Substance Use Topics    Alcohol use: Not Currently    Drug use: Never     Family History   Problem Relation Name Age of Onset    Lymphoma Mother      Prostate cancer Father          passed away [address] Medications:    norepinephrine, 0.01-3 mcg/kg/min, Last Rate: 0.09 mcg/kg/min (02/02/24 1400)          Current Facility-Administered Medications:     acetaminophen (Tylenol) tablet 1,000 mg, 1,000 mg, oral, TID, MADHURI Diaz-CNP, 1,000 mg at 02/02/24 0921    apixaban (Eliquis) tablet 2.5 mg, 2.5 mg, oral, BID, Judd Tavera DO, 2.5 mg at 02/02/24 0921    benzocaine-menthoL (Dermoplast) topical spray, , Topical, 4x daily PRN, SKIP DiazCNP    dextrose 10 % in water (D10W) infusion, 0.3 g/kg/hr, intravenous, Once PRN, Judd Tavera DO    dextrose 50 % injection 25 g, 25 g, intravenous, q15 min PRN, Judd Tavera DO    doxycycline (Vibramycin) capsule 100 mg, 100 mg, oral, Daily, Judd Barreto MD, 100 mg at 02/02/24 1205    escitalopram (Lexapro) tablet 10 mg, 10 mg, oral, Nightly,  Judd Tavera DO, 10 mg at 02/01/24 2019    fludrocortisone (Florinef) tablet 0.1 mg, 0.1 mg, oral, TID, Vinod Livingston PA-C, 0.1 mg at 02/02/24 0922    gabapentin (Neurontin) capsule 100 mg, 100 mg, oral, TID, Jazmin Sanchez, APRN-CNP, 100 mg at 02/02/24 0922    glucagon (Glucagen) injection 1 mg, 1 mg, intramuscular, q15 min PRN, Judd Tavera DO    heparin 1,000 unit/mL injection 2,000 Units, 2,000 Units, intra-catheter, After Dialysis, Saeed Mondragon MD, 1,600 Units at 02/02/24 1455    heparin 1,000 unit/mL injection 2,000 Units, 2,000 Units, intra-catheter, After Dialysis, Saeed Mondragon MD, 1,600 Units at 02/02/24 1454    [START ON 2/3/2024] heparin 1,000 unit/mL injection 2,000 Units, 2,000 Units, intra-catheter, After Dialysis, Saeed Mondragon MD    [START ON 2/3/2024] heparin 1,000 unit/mL injection 2,000 Units, 2,000 Units, intra-catheter, After Dialysis, Saeed Mondragon MD    insulin lispro (HumaLOG) injection 0-10 Units, 0-10 Units, subcutaneous, TID with meals, Judd Tavera DO, 2 Units at 02/02/24 0918    ipratropium-albuteroL (Duo-Neb) 0.5-2.5 mg/3 mL nebulizer solution 3 mL, 3 mL, nebulization, q6h while awake, Judd Barreto MD, 3 mL at 02/02/24 1139    midodrine (Proamatine) tablet 15 mg, 15 mg, oral, TID with meals, Judd Tavera DO, 15 mg at 02/02/24 1205    norepinephrine (Levophed) 8 mg in dextrose 5% 250 mL (0.032 mg/mL) infusion (premix), 0.01-3 mcg/kg/min, intravenous, Continuous, Yusuf De La Garza PA-C, Last Rate: 12.54 mL/hr at 02/02/24 1400, 0.09 mcg/kg/min at 02/02/24 1400    nystatin (Mycostatin) 100,000 unit/gram powder, , Topical, BID, Judd Tavera DO, 1 Application at 02/02/24 1130    nystatin (Mycostatin) cream, , Topical, BID, Nickolas Gallego, MADHURI-CNP, 1 Application at 02/02/24 1132    oxygen (O2) therapy, , inhalation, Continuous PRN - O2/gases, Judd Tavera DO, 4 L/min at 01/23/24 1600    pantoprazole (ProtoNix)  EC tablet 40 mg, 40 mg, oral, Daily, 40 mg at 02/02/24 0922 **OR** pantoprazole (ProtoNix) injection 40 mg, 40 mg, intravenous, Daily, Yusuf De La Garza PA-C, 40 mg at 01/21/24 0824    sennosides-docusate sodium (Judy-Colace) 8.6-50 mg per tablet 1 tablet, 1 tablet, oral, Nightly PRN, Yusuf De La Garza PA-C    sodium chloride 3 % nebulizer solution 3 mL, 3 mL, nebulization, TID, Charly Grossman MD, 3 mL at 02/02/24 1139    vancomycin (Vancocin) capsule 125 mg, 125 mg, oral, BID, Iraida Lehman APRN-CNP, 125 mg at 02/02/24 0923    zinc oxide 20 % ointment 1 Application, 1 Application, Topical, BID, MADHURI Blair-CNP, 1 Application at 02/02/24 1132    Review of Systems:  14 point review of systems was completed and negative except for those specially mention in my HPI    Physical Exam:    Heart Rate:  []   Temp:  [35.6 °C (96.1 °F)-36.5 °C (97.7 °F)]   Resp:  [9-27]   BP: ()/()   Weight:  [101 kg (221 lb 12.5 oz)]   SpO2:  [90 %-97 %]     Physical Exam  Constitutional:       Appearance: Normal appearance.   HENT:      Nose: Nose normal.      Mouth/Throat:      Mouth: Mucous membranes are moist.      Pharynx: Oropharynx is clear.   Eyes:      Pupils: Pupils are equal, round, and reactive to light.   Cardiovascular:      Rate and Rhythm: Normal rate and regular rhythm.      Pulses: Normal pulses.   Pulmonary:      Effort: Pulmonary effort is normal.   Abdominal:      General: Abdomen is flat.      Palpations: Abdomen is soft.   Musculoskeletal:         General: Normal range of motion.      Cervical back: Normal range of motion.   Skin:     General: Skin is warm and dry.      Capillary Refill: Capillary refill takes less than 2 seconds.      Findings: Bruising and laceration present.   Neurological:      General: No focal deficit present.      Mental Status: She is alert and oriented to person, place, and time.         Objective:    I have reviewed all medications, laboratory results, and imaging  pertinent for today's encounter.           Intake/Output Summary (Last 24 hours) at 2/2/2024 1502  Last data filed at 2/2/2024 1430  Gross per 24 hour   Intake 1164.26 ml   Output 1740 ml   Net -575.74 ml         Assessment/Plan:    I am currently managing this critically ill patient for the following problems:    #Septic Shock   #ESRD   #Chronic Hypotension   #Recurrent Pleural Effusions   #Vasculopathy   #Multiple Wounds with and with out infections  #Acute Metabolic Encephalopathy  #Metabolic lactic acidosis, resolved   #Hx of recurrent C. Diff infection  #Hx of Atrial fibrillation   #Hx of endocarditis   #Hx of T2 DM   #Hx of anemia of chronic disease     Neuro/Psych/Pain Ctrl/Sedation:  #Acute Metabolic Encephalopathy, resolving   -Pain Control: Tylenol for mild  -CAM Assessment every shift, neuro assessments q4   -Palliative on consult  -Continue home Lexapro and gabapentin  -Sleep/wake cycle hygiene  -Delirium precaution    Respiratory/ENT:  #Recurrent Pleural Effusions   -Currently on 2L NC, denies any shortness of breath  -Continuous pulse oximetry, maintain SpO2 >90%  -Thoracentesis 1/20 over 1L of hazy straw colored fluid, Lights criteria suggest Transudative    -Aggressive pulmonary/bronchial hygiene     Cardiovascular:  #Septic Shock  #Chronic Hypotension   #Vasculopathy   #Hx of Atrial fibrillation   #Hx of endocarditis    -Levophed titrate to maintain SBP > 70, plan to wean levo off when able  --Will need midline changed to PICC if plan to go LTACH  -Home midodrine 15 mg TID when mental status is appropriate   -Continue Fludrocortisone 0.1 TID  -Continue home eliquis   -Continuous cardiac monitoring    GI:  -Regular diet assess mental status before feeding, adding in Magic cup TID and Christian BID   -Flexacil for stools   -BR with senna/colace PRN   -Protonix daily   -elevated alkaline phosphatase most likely in the setting of steroid use    Renal/Volume Status (Intra & Extravascular):  #ESRD    #Metabolic lactic acidosis, resolved   -Nephrology following for dialysis needs, recommending M/W/F  --Will receive Tablo today  -Elevated Lactate 2/2 hypoperfusion -resolved  -Conservative fluid management  -Daily BMP, Replete electrolytes as indicated for ESRD      Endocrine  #Hx of T2 DM   -POCT glucose AC/HS  -SSI AC/HS    Infectious Disease:  #Multiple Wounds with and with out infections  #Hx of recurrent C. Diff infection  #Hx of endocarditis   -Doxycycline 100 mg every day for suppressive therapy   -Vancomycin PO for C. Diff prophylaxis   -ID following   -C. Diff and MRSA PCR negative   -Blood cultures from 1/19, negative   -Urine culture from 1/9, positive for proteus mirabilis     Heme/Onc:  #Hx of anemia of chronic disease   -monitor for s/s anemia  -transfuse for hgb <7  -daily CBC  -Duplex upper extremity US negative for UE DVT    OBGYN/MSK:  #Multiple Wounds with and with out infections  -Surgery consulted   -Wound care consulted for dressing other wounds   -Podiatry consulted  -XR Left ankle not suspicious for osteomyelitis   -XR Right ankle and tib/fib not suspicious for osteomyelitis   -Continue Uloric for gout prophylaxis   -padded pressure points  -ICU skin protocol    Ethics/Code Status:  Full Code    :  DVT Prophylaxis: Home eliquis  GI Prophylaxis: PPI  Bowel Regimen: Senna  Diet: Regular  CVC: None  Chandni: Right radial  Amos: none  Restraints: none  Dispo: Remain in ICU    Critical Care Time:  45 minutes spent in preparing to see patient (I.e. review of medical records), evaluation of diagnostics (I.e. labs, imaging, etc.), documentation, discussing plan of care with patient/ family/ caregiver, and/ or coordination of care with multidisciplinary team. Time does not include completion of procedure time.      Ronald Casey, APRN-CNP

## 2024-02-02 NOTE — PROGRESS NOTES
Patient not medically clear. Referral for [facility] was sent and patient meets criteria. [facility] requested that our team start precert on this day. Will follow.     **PATIENT DOES NOT HAVE A SAFE DISCHARGE PLAN      Shauna Jensen RN

## 2024-02-02 NOTE — PROGRESS NOTES
CONSULT PROGRESS NOTES    SERVICE DATE: 2/2/2024   SERVICE TIME: 10:48 AM    CONSULTING SERVICE: Nephrology    ASSESSMENT AND PLAN   1.  End-stage renal disease  2.  Hypotension  3.  Hyponatremia  4.  Hypokalemia  5.  Anemia of chronic kidney disease     Dialysis today with [facility] again with low temperature and attempting gentle fluid removal.    She is extremely malnourished with low albumin, low prealbumin, no major muscle mass.  She has been hospitalized for the majority of the past 2 months.  Hyponatremia from volume overload in this patient with oliguria.  For her hypokalemia, we are using a 4K bath.  Hemoglobin is close to goal.  Will give IV albumin with dialysis.  She did not really seem to respond to the fludrocortisone, seems dependent on norepinephrine, even at a lower dose.  She is still considering LTAC for placement.  She is meeting with hospice today, which is appropriate at least for her options.  I discussed this case with palliative care.    Case discussed with ICU team.  I do not think she benefits from CRRT.  No dialysis needs anticipated this weekend.  Give her IV magnesium and IV albumin with dialysis today.  I have no specific objection to keeping the arterial line out.  I also have no specific objection to transition from a midline to a PICC.    SUBJECTIVE  INTERVAL HPI: She is again somewhat tearful about her overall station.  She has no new complaints.  She has a rectal tube in place still.  Blood pressure still low and she is on stable doses of norepinephrine.  The arterial line may not be reading appropriately.    MEDICATIONS:  acetaminophen, 1,000 mg, oral, TID  albumin human, 12.5 g, intravenous, q1h  apixaban, 2.5 mg, oral, BID  doxycylcine, 100 mg, oral, Daily  escitalopram, 10 mg, oral, Nightly  fludrocortisone, 0.1 mg, oral, TID  gabapentin, 100 mg, oral, TID  heparin, 2,000 Units, intra-catheter, After Dialysis  heparin, 2,000 Units, intra-catheter, After Dialysis  [START ON  2/3/2024] heparin, 2,000 Units, intra-catheter, After Dialysis  [START ON 2/3/2024] heparin, 2,000 Units, intra-catheter, After Dialysis  insulin lispro, 0-10 Units, subcutaneous, TID with meals  ipratropium-albuteroL, 3 mL, nebulization, q6h while awake  magnesium sulfate, 2 g, intravenous, Once  midodrine, 15 mg, oral, TID with meals  nystatin, , Topical, BID  nystatin, , Topical, BID  pantoprazole, 40 mg, oral, Daily   Or  pantoprazole, 40 mg, intravenous, Daily  sodium chloride, 3 mL, nebulization, TID  vancomycin, 125 mg, oral, BID  zinc oxide, 1 Application, Topical, BID       norepinephrine, 0.01-3 mcg/kg/min, Last Rate: 0.09 mcg/kg/min (02/02/24 1000)       PRN medications: benzocaine-menthoL, dextrose 10 % in water (D10W), dextrose, glucagon, oxygen, sennosides-docusate sodium     OBJECTIVE  PHYSICAL EXAM:   Heart Rate:  []   Temp:  [35.6 °C (96.1 °F)-36.9 °C (98.4 °F)]   Resp:  [9-27]   BP: ()/()   SpO2:  [90 %-97 %]   Body mass index is 41.49 kg/m².  Chronically ill-appearing elderly white woman  Pale skin  Right-sided hand swelling  Left-sided finger amputation  Internal jugular tunneled hemodialysis catheter in place  There is no significant pretibial edema on both lower extremities  Soft abdomen  No Amos  No obvious joint deformities  Moist mucosa  Hearing seems to be intact  Phonation intact    DATA:   Labs:  Results for orders placed or performed during the hospital encounter of 01/19/24 (from the past 96 hour(s))   POCT GLUCOSE   Result Value Ref Range    POCT Glucose 126 (H) 74 - 99 mg/dL   POCT GLUCOSE   Result Value Ref Range    POCT Glucose 141 (H) 74 - 99 mg/dL   CBC   Result Value Ref Range    WBC 15.6 (H) 4.4 - 11.3 x10*3/uL    nRBC 0.0 0.0 - 0.0 /100 WBCs    RBC 3.35 (L) 4.00 - 5.20 x10*6/uL    Hemoglobin 9.9 (L) 12.0 - 16.0 g/dL    Hematocrit 30.6 (L) 36.0 - 46.0 %    MCV 91 80 - 100 fL    MCH 29.6 26.0 - 34.0 pg    MCHC 32.4 32.0 - 36.0 g/dL    RDW 25.3 (H) 11.5 - 14.5 %     Platelets 169 150 - 450 x10*3/uL   Comprehensive metabolic panel   Result Value Ref Range    Glucose 210 (H) 65 - 99 mg/dL    Sodium 133 133 - 145 mmol/L    Potassium 4.2 3.4 - 5.1 mmol/L    Chloride 98 97 - 107 mmol/L    Bicarbonate 24 24 - 31 mmol/L    Urea Nitrogen 7 (L) 8 - 25 mg/dL    Creatinine 2.20 (H) 0.40 - 1.60 mg/dL    eGFR 23 (L) >60 mL/min/1.73m*2    Calcium 8.3 (L) 8.5 - 10.4 mg/dL    Albumin 2.6 (L) 3.5 - 5.0 g/dL    Alkaline Phosphatase 255 (H) 35 - 125 U/L    Total Protein 5.1 (L) 5.9 - 7.9 g/dL    AST 35 5 - 40 U/L    Bilirubin, Total 1.4 (H) 0.1 - 1.2 mg/dL    ALT 15 5 - 40 U/L    Anion Gap 11 <=19 mmol/L   Magnesium   Result Value Ref Range    Magnesium 1.70 1.60 - 3.10 mg/dL   Phosphorus   Result Value Ref Range    Phosphorus 2.0 (L) 2.5 - 4.5 mg/dL   POCT GLUCOSE   Result Value Ref Range    POCT Glucose 166 (H) 74 - 99 mg/dL   POCT GLUCOSE   Result Value Ref Range    POCT Glucose 167 (H) 74 - 99 mg/dL   POCT GLUCOSE   Result Value Ref Range    POCT Glucose 183 (H) 74 - 99 mg/dL   POCT GLUCOSE   Result Value Ref Range    POCT Glucose 112 (H) 74 - 99 mg/dL   CBC   Result Value Ref Range    WBC 15.9 (H) 4.4 - 11.3 x10*3/uL    nRBC 0.0 0.0 - 0.0 /100 WBCs    RBC 3.30 (L) 4.00 - 5.20 x10*6/uL    Hemoglobin 9.7 (L) 12.0 - 16.0 g/dL    Hematocrit 30.4 (L) 36.0 - 46.0 %    MCV 92 80 - 100 fL    MCH 29.4 26.0 - 34.0 pg    MCHC 31.9 (L) 32.0 - 36.0 g/dL    RDW 25.0 (H) 11.5 - 14.5 %    Platelets 200 150 - 450 x10*3/uL   Comprehensive metabolic panel   Result Value Ref Range    Glucose 160 (H) 65 - 99 mg/dL    Sodium 134 133 - 145 mmol/L    Potassium 4.1 3.4 - 5.1 mmol/L    Chloride 98 97 - 107 mmol/L    Bicarbonate 21 (L) 24 - 31 mmol/L    Urea Nitrogen 11 8 - 25 mg/dL    Creatinine 2.80 (H) 0.40 - 1.60 mg/dL    eGFR 18 (L) >60 mL/min/1.73m*2    Calcium 8.5 8.5 - 10.4 mg/dL    Albumin 2.5 (L) 3.5 - 5.0 g/dL    Alkaline Phosphatase 253 (H) 35 - 125 U/L    Total Protein 5.0 (L) 5.9 - 7.9 g/dL    AST 38  5 - 40 U/L    Bilirubin, Total 1.4 (H) 0.1 - 1.2 mg/dL    ALT 15 5 - 40 U/L    Anion Gap 15 <=19 mmol/L   Magnesium   Result Value Ref Range    Magnesium 1.70 1.60 - 3.10 mg/dL   Phosphorus   Result Value Ref Range    Phosphorus 2.6 2.5 - 4.5 mg/dL   POCT GLUCOSE   Result Value Ref Range    POCT Glucose 143 (H) 74 - 99 mg/dL   POCT GLUCOSE   Result Value Ref Range    POCT Glucose 144 (H) 74 - 99 mg/dL   POCT GLUCOSE   Result Value Ref Range    POCT Glucose 187 (H) 74 - 99 mg/dL   POCT GLUCOSE   Result Value Ref Range    POCT Glucose 250 (H) 74 - 99 mg/dL   CBC   Result Value Ref Range    WBC 15.5 (H) 4.4 - 11.3 x10*3/uL    nRBC 0.0 0.0 - 0.0 /100 WBCs    RBC 3.13 (L) 4.00 - 5.20 x10*6/uL    Hemoglobin 9.2 (L) 12.0 - 16.0 g/dL    Hematocrit 29.6 (L) 36.0 - 46.0 %    MCV 95 80 - 100 fL    MCH 29.4 26.0 - 34.0 pg    MCHC 31.1 (L) 32.0 - 36.0 g/dL    RDW 25.0 (H) 11.5 - 14.5 %    Platelets 204 150 - 450 x10*3/uL   Comprehensive metabolic panel   Result Value Ref Range    Glucose 193 (H) 65 - 99 mg/dL    Sodium 131 (L) 133 - 145 mmol/L    Potassium 4.2 3.4 - 5.1 mmol/L    Chloride 99 97 - 107 mmol/L    Bicarbonate 21 (L) 24 - 31 mmol/L    Urea Nitrogen 10 8 - 25 mg/dL    Creatinine 2.40 (H) 0.40 - 1.60 mg/dL    eGFR 21 (L) >60 mL/min/1.73m*2    Calcium 8.3 (L) 8.5 - 10.4 mg/dL    Albumin 2.3 (L) 3.5 - 5.0 g/dL    Alkaline Phosphatase 239 (H) 35 - 125 U/L    Total Protein 4.9 (L) 5.9 - 7.9 g/dL    AST 32 5 - 40 U/L    Bilirubin, Total 1.1 0.1 - 1.2 mg/dL    ALT 13 5 - 40 U/L    Anion Gap 11 <=19 mmol/L   Magnesium   Result Value Ref Range    Magnesium 1.60 1.60 - 3.10 mg/dL   Phosphorus   Result Value Ref Range    Phosphorus 2.5 2.5 - 4.5 mg/dL   POCT GLUCOSE   Result Value Ref Range    POCT Glucose 191 (H) 74 - 99 mg/dL   POCT GLUCOSE   Result Value Ref Range    POCT Glucose 209 (H) 74 - 99 mg/dL   POCT GLUCOSE   Result Value Ref Range    POCT Glucose 132 (H) 74 - 99 mg/dL   Phosphorus   Result Value Ref Range     Phosphorus 2.9 2.5 - 4.5 mg/dL   CBC   Result Value Ref Range    WBC 13.6 (H) 4.4 - 11.3 x10*3/uL    nRBC 0.0 0.0 - 0.0 /100 WBCs    RBC 3.10 (L) 4.00 - 5.20 x10*6/uL    Hemoglobin 9.3 (L) 12.0 - 16.0 g/dL    Hematocrit 28.9 (L) 36.0 - 46.0 %    MCV 93 80 - 100 fL    MCH 30.0 26.0 - 34.0 pg    MCHC 32.2 32.0 - 36.0 g/dL    RDW 24.3 (H) 11.5 - 14.5 %    Platelets 227 150 - 450 x10*3/uL   Comprehensive metabolic panel   Result Value Ref Range    Glucose 189 (H) 65 - 99 mg/dL    Sodium 131 (L) 133 - 145 mmol/L    Potassium 4.2 3.4 - 5.1 mmol/L    Chloride 98 97 - 107 mmol/L    Bicarbonate 20 (L) 24 - 31 mmol/L    Urea Nitrogen 15 8 - 25 mg/dL    Creatinine 3.00 (H) 0.40 - 1.60 mg/dL    eGFR 16 (L) >60 mL/min/1.73m*2    Calcium 8.5 8.5 - 10.4 mg/dL    Albumin 2.3 (L) 3.5 - 5.0 g/dL    Alkaline Phosphatase 247 (H) 35 - 125 U/L    Total Protein 5.1 (L) 5.9 - 7.9 g/dL    AST 34 5 - 40 U/L    Bilirubin, Total 1.0 0.1 - 1.2 mg/dL    ALT 14 5 - 40 U/L    Anion Gap 13 <=19 mmol/L   Magnesium   Result Value Ref Range    Magnesium 1.50 (L) 1.60 - 3.10 mg/dL   POCT GLUCOSE   Result Value Ref Range    POCT Glucose 177 (H) 74 - 99 mg/dL         SIGNATURE: Saeed Mondragon MD PATIENT NAME: Ayanna Gross   DATE: February 2, 2024 MRN: [id number]   TIME: 10:48 AM PAGER: [phone]

## 2024-02-02 NOTE — CARE PLAN
"The patient's goals for the shift include \"talk about discharge\"    The clinical goals for the shift include stable blood pressure    Problem: Pain  Goal: My pain/discomfort is manageable  Outcome: Progressing     Problem: Safety  Goal: Patient will be injury free during hospitalization  Outcome: Progressing  Goal: I will remain free of falls  Outcome: Progressing     Problem: Daily Care  Goal: Daily care needs are met  Outcome: Progressing     Problem: Daily Care  Goal: Daily care needs are met  Outcome: Progressing     Problem: Psychosocial Needs  Goal: Demonstrates ability to cope with hospitalization/illness  Outcome: Progressing  Goal: Collaborate with me, my family, and caregiver to identify my specific goals  Outcome: Progressing     Problem: Psychosocial Needs  Goal: Collaborate with me, my family, and caregiver to identify my specific goals  Outcome: Progressing     Problem: Discharge Barriers  Goal: My discharge needs are met  Outcome: Progressing     Problem: Fall/Injury  Goal: Not fall by end of shift  Outcome: Progressing  Goal: Be free from injury by end of the shift  Outcome: Progressing  Goal: Verbalize understanding of personal risk factors for fall in the hospital  Outcome: Progressing  Goal: Verbalize understanding of risk factor reduction measures to prevent injury from fall in the home  Outcome: Progressing  Goal: Pace activities to prevent fatigue by end of the shift  Outcome: Progressing     Problem: Skin  Goal: Decreased wound size/increased tissue granulation at next dressing change  Outcome: Progressing  Flowsheets (Taken 2/1/2024 1837 by Peri Martin RN)  Decreased wound size/increased tissue granulation at next dressing change:   Promote sleep for wound healing   Utilize specialty bed per algorithm   Protective dressings over bony prominences  Goal: Participates in plan/prevention/treatment measures  Outcome: Progressing  Flowsheets (Taken 2/1/2024 1837 by Peri Martin, " RN)  Participates in plan/prevention/treatment measures:   Discuss with provider PT/OT consult   Increase activity/out of bed for meals   Elevate heels  Goal: Prevent/manage excess moisture  Outcome: Progressing  Flowsheets (Taken 2/1/2024 1837 by Peri Martin RN)  Prevent/manage excess moisture:   Cleanse incontinence/protect with barrier cream   Moisturize dry skin   Follow provider orders for dressing changes  Goal: Prevent/minimize sheer/friction injuries  Outcome: Progressing  Flowsheets (Taken 2/1/2024 1837 by Peri Martin, RN)  Prevent/minimize sheer/friction injuries:   Complete micro-shifts as needed if patient unable. Adjust patient position to relieve pressure points, not a full turn   HOB 30 degrees or less   Increase activity/out of bed for meals   Turn/reposition every 2 hours/use positioning/transfer devices  Goal: Promote/optimize nutrition  Outcome: Progressing  Flowsheets (Taken 2/1/2024 1837 by Peri Martin RN)  Promote/optimize nutrition:   Consume > 50% meals/supplements   Offer water/supplements/favorite foods   Monitor/record intake including meals  Goal: Promote skin healing  Outcome: Progressing  Flowsheets (Taken 2/1/2024 1837 by Peri Martin RN)  Promote skin healing:   Assess skin/pad under line(s)/device(s)   Ensure correct size (line/device) and apply per  instructions   Protective dressings over bony prominences   Rotate device position/do not position patient on device   Turn/reposition every 2 hours/use positioning/transfer devices     Problem: Pain  Goal: Takes deep breaths with improved pain control throughout the shift  Outcome: Progressing  Goal: Turns in bed with improved pain control throughout the shift  Outcome: Progressing  Goal: Walks with improved pain control throughout the shift  Outcome: Progressing  Goal: Performs ADL's with improved pain control throughout shift  Outcome: Progressing  Goal: Participates in PT with improved pain  control throughout the shift  Outcome: Progressing  Goal: Free from opioid side effects throughout the shift  Outcome: Progressing  Goal: Free from acute confusion related to pain meds throughout the shift  Outcome: Progressing     Problem: Respiratory  Goal: Clear secretions with interventions this shift  Outcome: Progressing  Goal: Minimize anxiety/maximize coping throughout shift  Outcome: Progressing  Goal: Minimal/no exertional discomfort or dyspnea this shift  Outcome: Progressing  Goal: No signs of respiratory distress (eg. Use of accessory muscles. Peds grunting)  Outcome: Progressing  Goal: Patent airway maintained this shift  Outcome: Progressing  Goal: Tolerate pulmonary toileting this shift  Outcome: Progressing  Goal: Verbalize decreased shortness of breath this shift  Outcome: Progressing  Goal: Wean oxygen to maintain O2 saturation per order/standard this shift  Outcome: Progressing  Goal: Increase self care and/or family involvement in next 24 hours  Outcome: Progressing     Problem: Diabetes  Goal: Maintain electrolyte levels within acceptable range throughout shift  Outcome: Progressing  Goal: Maintain glucose levels >70mg/dl to <250mg/dl throughout shift  Outcome: Progressing  Goal: No changes in neurological exam by end of shift  Outcome: Progressing  Goal: Learn about and adhere to nutrition recommendations by end of shift  Outcome: Progressing  Goal: Vital signs within normal range for age by end of shift  Outcome: Progressing  Goal: Increase self care and/or family involovement by end of shift  Outcome: Progressing  Goal: Receive DSME education by end of shift  Outcome: Progressing     Problem: Discharge Planning  Goal: Discharge to home or other facility with appropriate resources  Outcome: Progressing     Problem: Chronic Conditions and Co-morbidities  Goal: Patient's chronic conditions and co-morbidity symptoms are monitored and maintained or improved  Outcome: Progressing

## 2024-02-03 NOTE — PROGRESS NOTES
Ayanna Gross is a 73 y.o. female on day 15 of admission presenting with Septic shock (CMS/HCC).    Subjective   Interval History:   Afebrile, no chills  Remains on 2 liters oxygen  Discussed with nursing  Patient and family have decided LTAC      Objective   Range of Vitals (last 24 hours)  Heart Rate:  []   Temp:  [35.6 °C (96 °F)-36.8 °C (98.2 °F)]   Resp:  [12-23]   BP: (46-90)/(19-70)   Weight:  [101 kg (221 lb 12.5 oz)]   SpO2:  [93 %-100 %]   Daily Weight  02/02/24 : 101 kg (221 lb 12.5 oz)    Body mass index is 40.56 kg/m².    Physical Exam  Constitutional:       Appearance: Normal appearance.   HENT:      Head: Normocephalic and atraumatic.      Nose: Nose normal.   Eyes:      Extraocular Movements: Extraocular movements intact.      Conjunctiva/sclera: Conjunctivae normal.   Cardiovascular:      Heart sounds: Normal heart sounds, S1 normal and S2 normal.   Pulmonary:      Breath sounds: Decreased breath sounds present.   Abdominal:      General: Bowel sounds are normal.      Palpations: Abdomen is soft.   Musculoskeletal:      Cervical back: Normal range of motion and neck supple.   Skin:     Comments: Stage III sacral ulcer, bilateral posterior heel eschar -photos examined  Neurological:      Mental Status: She is alert.        Antibiotics  aspirin tablet 325 mg  acetaminophen (Tylenol) tablet 650 mg  cefepime (Maxipime) 1 g in dextrose 5 % 50 mL IV  sodium chloride 0.9 % bolus 2,229 mL  apixaban (Eliquis) tablet 2.5 mg  escitalopram (Lexapro) tablet 10 mg  febuxostat (Uloric) tablet 40 mg  fenofibrate (Triglide) tablet 160 mg  gabapentin (Neurontin) capsule 100 mg  midodrine (Proamatine) tablet 15 mg  nystatin (Mycostatin) 100,000 unit/gram powder  oxyCODONE-acetaminophen (Percocet) 5-325 mg per tablet 1 tablet  simvastatin (Zocor) tablet 20 mg  piperacillin-tazobactam-dextrose (Zosyn) IV 2.25 g  vancomycin (Vancocin) capsule 125 mg  oxygen (O2) therapy  dextrose 50 % injection 25 g  glucagon  [facility]    PATIENT'S NAME: Fara Vela   ATTENDING PHYSICIAN: Brooke Schmidt MD   PATIENT ACCOUNT#:   [id number]    LOCATION:  [address] [place], [place] [id number] [facility]  MEDICAL RECORD #:   [id number]       YOB: 1992  ADMISSION DATE:       12/08/2021 (Glucagen) injection 1 mg  dextrose 10 % in water (D10W) infusion  insulin lispro (HumaLOG) injection 0-10 Units  nystatin (Mycostatin) 100,000 unit/gram powder 1 Application  vancomycin-diluent combo no.1 (Xellia) IVPB 1,750 mg  piperacillin-tazobactam-dextrose (Zosyn) IV 2.25 g  sodium chloride 0.9 % bolus 500 mL  norepinephrine (Levophed) 8 mg in dextrose 5% 250 mL (0.032 mg/mL) infusion (premix)  vancomycin (Vancocin) placeholder  pantoprazole (ProtoNix) EC tablet 40 mg  pantoprazole (ProtoNix) injection 40 mg  sennosides-docusate sodium (Judy-Colace) 8.6-50 mg per tablet 1 tablet  doxycycline (Vibramycin) in dextrose 5 % in water (D5W) 100 mL  mg  LORazepam (Ativan) injection 2 mg  magnesium sulfate IV 2 g  zinc oxide 20 % ointment 1 Application  nystatin (Mycostatin) cream  norepinephrine (Levophed) 8 mg in dextrose 5% 250 mL (0.032 mg/mL) infusion (premix)  heparin 1,000 unit/mL injection 2,000 Units  heparin 1,000 unit/mL injection 2,000 Units  nystatin (Mycostatin) ointment  acetaminophen (Tylenol) oral liquid 1,000 mg  albumin human 25 % solution 12.5 g  perflutren lipid microspheres (Definity) injection 0.5-10 mL of dilution  sulfur hexafluoride microsphr (Lumason) injection 24.28 mg  perflutren protein A microsphere (Optison) injection 0.5 mL  ipratropium-albuteroL (Duo-Neb) 0.5-2.5 mg/3 mL nebulizer solution 3 mL  sodium chloride 3 % nebulizer solution 3 mL  vancomycin-diluent combo no.1 (Xellia) IVPB 750 mg  sennosides-docusate sodium (Judy-Colace) 8.6-50 mg per tablet 1 tablet  acetaminophen (Tylenol) tablet 650 mg  doxycycline (Vibramycin) capsule 100 mg  magnesium oxide (Mag-Ox) tablet 400 mg  vasopressin (Vasostrict) 0.2 unit/mL infusion  norepinephrine (Levophed) 8 mg in dextrose 5% 250 mL (0.032 mg/mL) infusion (premix)  heparin 1,000 unit/mL injection 2,000 Units  heparin 1,000 unit/mL injection 2,000 Units  albumin human 25 % solution 12.5 g  vancomycin (Xellia) 1 g in 200 mL (Xellia)  air.   HEENT:  Atraumatic. Oropharynx clear. Moist mucous membranes. Normal hard and soft palate. Eyes:  Anicteric sclerae. Pupils equal, round, and reactive. NECK:  Supple. No lymphadenopathy. Trachea midline. Full range of motion.    LUNGS:  Ranjit IVPB 1 g  ipratropium-albuteroL (Duo-Neb) 0.5-2.5 mg/3 mL nebulizer solution 3 mL  sodium chloride 3 % nebulizer solution 3 mL  albumin human 5 % infusion 12.5 g  heparin 1,000 unit/mL injection 2,000 Units  heparin 1,000 unit/mL injection 2,000 Units  vancomycin (Vancocin) capsule 125 mg  albumin human 25 % solution 12.5 g  heparin 1,000 unit/mL injection 2,000 Units  heparin 1,000 unit/mL injection 2,000 Units  ipratropium-albuteroL (Duo-Neb) 0.5-2.5 mg/3 mL nebulizer solution 3 mL  lidocaine (Xylocaine) 10 mg/mL (1 %) injection 50 mg  sodium phosphate 15 mmol in sodium chloride 0.9% 250 mL IV  sod phos di, mono-K phos mono (K Phos Neutral) tablet 250 mg  potassium, sodium phosphates (Phos-NaK) 280-160-250 mg packet 1 packet  doxycycline (Vibramycin) capsule 100 mg  fludrocortisone (Florinef) tablet 0.1 mg      Relevant Results  Labs  Results from last 72 hours   Lab Units 02/03/24  0407 02/02/24  0400 02/01/24  0455   WBC AUTO x10*3/uL 14.2* 13.6* 15.5*   HEMOGLOBIN g/dL 8.7* 9.3* 9.2*   HEMATOCRIT % 27.7* 28.9* 29.6*   PLATELETS AUTO x10*3/uL 211 227 204       Results from last 72 hours   Lab Units 02/03/24  0407 02/02/24  0400 02/01/24  0455   SODIUM mmol/L 135 131* 131*   POTASSIUM mmol/L 4.0 4.2 4.2   CHLORIDE mmol/L 100 98 99   CO2 mmol/L 21* 20* 21*   BUN mg/dL 11 15 10   CREATININE mg/dL 2.20* 3.00* 2.40*   GLUCOSE mg/dL 186* 189* 193*   CALCIUM mg/dL 8.4* 8.5 8.3*   ANION GAP mmol/L 14 13 11   EGFR mL/min/1.73m*2 23* 16* 21*   PHOSPHORUS mg/dL 2.2* 2.9 2.5       Results from last 72 hours   Lab Units 02/03/24  0407 02/02/24  0400 02/01/24  0455   ALK PHOS U/L 216* 247* 239*   BILIRUBIN TOTAL mg/dL 1.2 1.0 1.1   PROTEIN TOTAL g/dL 5.4* 5.1* 4.9*   ALT U/L 11 14 13   AST U/L 24 34 32   ALBUMIN g/dL 2.7* 2.3* 2.3*       Estimated Creatinine Clearance: 26.1 mL/min (A) (by C-G formula based on SCr of 2.2 mg/dL (H)).  C-Reactive Protein   Date Value Ref Range Status   12/25/2023 5.20 (H) 0.00 - 2.00 mg/dL Final      CRP   Date Value Ref Range Status   06/23/2023 19.9 (H) 0 - 2.0 MG/DL Final     Comment:     Performed at [address] [address]   05/22/2022 1.0 0 - 2.0 MG/DL Final     Comment:     Performed at [address] [address]     Microbiology  No results found for the last 14 days.  Reviewed   Imaging  Upper extremity venous duplex bilateral    Result Date: 1/25/2024           [address] [address]            Phone [phone]  Vascular Lab Report  [facility] US UPPER EXTREMITY VENOUS DUPLEX BILATERAL Patient Name:      BASIA VEGA      Reading Physician:  [id number] Mini Busby MD, RPVI Study Date:        1/25/2024           Ordering Provider:  [id number] ERI BERMAN MRN/PID:           [id number]            Fellow: Accession#:        [id number]        Technologist:       Dara Parr RVT Date of Birth/Age: 1950 / 73 years Technologist 2: Gender:            F                   Encounter#:         [id number] Admission Status:  Inpatient           Location Performed: [facility]  Diagnosis/ICD: Right arm swelling-M79.89; Left arm swelling-M79.89 CPT Codes:     82263 Peripheral venous duplex scan for DVT complete  CONCLUSIONS:  Right Upper Venous: No evidence of acute deep vein thrombus visualized in the right upper extremity. Internal jugular vein was visualized in segments due to IV lines and bandages. Left Upper Venous: No evidence of acute deep vein thrombus visualized in the left upper extremity. Subclavian stent is noted and appears patent. There is a known occluded dialysis access noted.  Additional Findings: Technically difficult exam due to IV lines, bandages, and patient's positioning.  Imaging & Doppler Findings:  Right               Compressible Thrombus        Flow Internal Jugular        Yes        None   Spontaneous/Phasic Subclavian               Yes        None Subclavian Proximal     Yes        None   Spontaneous/Phasic Subclavian Mid          Yes        None Subclavian Distal       Yes        None   Spontaneous/Phasic Axillary                Yes        None       Pulsatile Brachial                Yes        None Cephalic                Yes        None Basilic                 Yes        None  Left                Compress Thrombus        Flow Internal Jugular      Yes      None       Pulsatile Subclavian            Yes      None Subclavian Proximal   Yes      None       Pulsatile Subclavian Mid        Yes      None       Pulsatile Subclavian Distal     Yes      None       Pulsatile Axillary              Yes      None   Spontaneous/Phasic Brachial              Yes      None Cephalic              Yes      None Basilic               Yes      None  [id number] Mini Busby MD, ALICE Electronically signed by [id number] Mini Busby MD, RPVI on 1/25/2024 at 4:06:13 PM  ** Final **     ECG 12 lead    Result Date: 1/24/2024  Sinus tachycardia  with 1st degree AV block Right bundle branch block Possible Lateral infarct , age undetermined Abnormal ECG Confirmed by Yesika Nj ([id number]) on 1/24/2024 6:54:45 AM    XR tibia fibula right 2 views    Result Date: 1/22/2024  Interpreted By:  Real Mendieta, STUDY: XR TIBIA FIBULA RIGHT 2 VIEWS; 1/22/2024 2:15 pm   INDICATION: Signs/Symptoms:evaluate source of infection, osteomyelitis;   COMPARISON: None available.   ACCESSION NUMBER(S): [id number]   ORDERING CLINICIAN: BAN GIFFORD   TECHNIQUE: Views: AP and Lateral of the right tibia and fibula   FINDINGS: RESULT: There is no evidence for fracture or dislocation. Tricompartmental degenerative changes of the right knee. The tibia and fibula appear intact without bony erosion or evidence for osteomyelitis. No other bony or soft tissue abnormality is identified. No subcutaneous gas is noted.       No evidence for acute osseous abnormality. No bony erosion to suggest osteomyelitis.    Signed by: Real Mendieta 1/22/2024 3:15 PM Dictation workstation:   [id number]    XR ankle right 3+ views    Result Date: 1/22/2024  Interpreted By:  Real Mendieta, STUDY: XR ANKLE RIGHT 3+ VIEWS; 1/22/2024 2:15 pm   INDICATION: Signs/Symptoms:Evuluate source of infection, osteomyelitis;   COMPARISON: None available.   ACCESSION NUMBER(S): [id number]   ORDERING CLINICIAN: BAN GIFFORD   TECHNIQUE: Views: AP, Lateral, Oblique, right ankle   FINDINGS: RESULT: There is no evidence for fracture or dislocation. The ankle mortise is intact. Joint spaces appear adequately maintained. No bony erosion to suggest osteomyelitis. No bone lesion or soft tissue abnormality is identified. No subcutaneous gas is seen.       No evidence for acute osseous abnormality. No bony erosion to suggest osteomyelitis.   Signed by: Real Mendieta 1/22/2024 3:14 PM Dictation workstation:   [id number]    Transthoracic Echo (TTE) Complete    Result Date: 1/22/2024           [place] [address]            Phone [phone] TRANSTHORACIC ECHOCARDIOGRAM REPORT  Patient Name:      BASIA Gray Physician:   [id number] Fausto Rowe DO Study Date:        1/22/2024           Ordering Provider:   [id number] BERMAN MRN/PID:           [id number]            Fellow: Accession#:        [id number]        Nurse: Date of Birth/Age: 1950 / 73 years Sonographer:         Tabatha Page RDCS Gender:            F                   Additional Staff: Height:            157.00 cm           Admit Date: Weight:            75.00 kg            Admission Status:    Inpatient - Routine BSA:               1.76 m2             Department Location: [facility] Blood Pressure: 88 /49 mmHg Study Type:    TRANSTHORACIC ECHO (TTE) COMPLETE Diagnosis/ICD: Chronic combined systolic (congestive) and diastolic (congestive)                heart failure  (CHF)-I50.42; Sepsis, unspecified organism-A41.9 Indication:    heart failure,septic shock CPT Codes:     Echo Complete w Full Doppler-18750 Patient History: BMI:               Obese >30 Pertinent History: Sepsis, PVD, A-Fib, CVA, Cancer and HTN. breast                    prosthesis/ca, ESRD,anemia,septic shock,heart failure. Study Detail: The following Echo studies were performed: 2D, M-Mode, Doppler and               color flow. Technically challenging study due to prosthesis,               prominent lung artifact and body habitus.  PHYSICIAN INTERPRETATION: Left Ventricle: Left ventricular systolic function is normal, with an estimated ejection fraction of 70%. There are no regional wall motion abnormalities. The left ventricular cavity size is normal. Left ventricular diastolic filling was indeterminate. Left Atrium: The left atrium is moderately dilated. Right Ventricle: The right ventricle is normal in size. There is normal right ventricular global systolic function. Right Atrium: The right atrium is normal in size. Aortic Valve: The aortic valve is trileaflet. There is no evidence of aortic valve regurgitation. The peak instantaneous gradient of the aortic valve is 2.8 mmHg. Mitral Valve: The mitral valve is normal in structure. There is no evidence of mitral valve regurgitation. Tricuspid Valve: The tricuspid valve is structurally normal. There is trace tricuspid regurgitation. Pulmonic Valve: The pulmonic valve is not well visualized. The pulmonic valve regurgitation was not well visualized. Pericardium: There is no pericardial effusion noted. Aorta: The aortic root is normal.  CONCLUSIONS:  1. Left ventricular systolic function is normal with a 70% estimated ejection fraction.  2. The left atrium is moderately dilated.  3. Left ventricular diastolic filling indeterminate. QUANTITATIVE DATA SUMMARY: 2D MEASUREMENTS:                          Normal Ranges: LAs:           4.50 cm   (2.7-4.0cm) IVSd:           0.61 cm   (0.6-1.1cm) LVPWd:         0.88 cm   (0.6-1.1cm) LVIDd:         3.66 cm   (3.9-5.9cm) LV Mass Index: 41.9 g/m2 LV SYSTOLIC FUNCTION BY 2D PLANIMETRY (MOD):                     Normal Ranges: EF-A4C View: 68.3 % (>=55%) LV DIASTOLIC FUNCTION:                     Normal Ranges: MV Peak E: 1.19 m/s (0.7-1.2 m/s) MV Peak A: 0.90 m/s (0.42-0.7 m/s) E/A Ratio: 1.32     (1.0-2.2) MITRAL VALVE:                 Normal Ranges: MV DT: 192 msec (150-240msec) AORTIC VALVE:                         Normal Ranges: AoV Vmax:      0.84 m/s (<=1.7m/s) AoV Peak P.8 mmHg (<20mmHg) LVOT Max Shimon:  0.47 m/s (<=1.1m/s) LVOT Diameter: 1.90 cm  (1.8-2.4cm) AoV Area,Vmax: 1.57 cm2 (2.5-4.5cm2) TRICUSPID VALVE/RVSP:                   Normal Ranges: IVC Diam: 1.05 cm  [id number] Fausto Van Ness campussa DENNIS Electronically signed on 2024 at 2:53:04 PM  ** Final **     XR foot left 3+ views    Result Date: 2024  Interpreted By:  Real Mendieta, STUDY: XR FOOT LEFT 1-2 VIEWS; 2024 4:15 pm   INDICATION: Signs/Symptoms:osteomyelitis. Pain   COMPARISON: 2023   ACCESSION NUMBER(S): [id number]   ORDERING CLINICIAN: BAN GIFFORD   TECHNIQUE: Views:  AP, Lat, Oblique, left foot   FINDINGS: RESULT: There is no evidence for fracture or dislocation. 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 Mild hammertoe deformities. Joint spaces appear adequately maintained. Soft tissue ulceration of the posterior aspect of the heel however no evidence for bony erosion to suggest osteomyelitis.       No evidence for acute fracture or acute bony erosion to suggest osteomyelitis. Chronic changes as described.   Signed by: Real Mendieta 2024 7:15 PM Dictation workstation:   [id number]    CT chest abdomen pelvis wo IV contrast    Result Date: 2024  Interpreted By:  Maria Garcia, STUDY: CT CHEST ABDOMEN PELVIS WO CONTRAST;  2024 11:42 pm   INDICATION: Mental status change. Elevated white blood cell count with  infectious workup.   COMPARISON: 08/20/2023   ACCESSION NUMBER(S): [id number]   ORDERING CLINICIAN: BAN GIFFORD   TECHNIQUE: CT of the chest, abdomen and pelvis was performed with no oral or intravenous contrast administered. Sagittal and coronal reformations were completed by the technologist at the acquisition scanner.   All CT examinations are performed with 1 or more of the following dose reduction techniques: Automated exposure control, adjustment of mA and/or kv according to patient's size, or use of iterative reconstruction techniques.   FINDINGS: Please note that the study is limited without intravenous contrast.   CHEST: Bilateral pleural effusions are present with right effusion moderately small in size and with the left effusion moderately small in size as well. There is shift of the heart and mediastinum to the left of midline due to atelectasis of the left upper lobe. There is consolidation throughout left lower lobe with air bronchograms noted. On the right, there is compressive atelectasis seen posteriorly in the right lower lobe.   There is mild reactive mediastinal adenopathy seen at this time with mediastinal lymph nodes increased in size since prior study. Several of these mediastinal nodes are at the upper limits of normal measuring 8 mm in short axis diameter.   No pericardial effusion is present. The cardiac size is normal with mitral annulus calcification.   There has been prior bilateral mastectomy with reconstruction of the left breast with implant placement. Surgical clips are visible within the left axilla. Stent grafts are visible within the left upper extremity and within the left innominate, subclavian as well as axillary veins.   A peripherally calcified 1.8 cm nodule arises from the lower pole of left thyroid lobe.   ABDOMEN:   LIVER: Unremarkable.   BILE DUCTS: No intrahepatic or extrahepatic biliary ductal dilatation is seen.   GALLBLADDER: Cholelithiasis is observed with some  calcification of the gallbladder wall demonstrated as well. No gallbladder wall thickening or pericholecystic fluid is evident.   PANCREAS: Unremarkable   SPLEEN: Unremarkable   ADRENAL GLANDS: Unremarkable   KIDNEYS AND URETERS: Kidneys are small in size with extensive renal artery calcification demonstrated.   PELVIS:   BLADDER: Amos catheter is present within the decompressed urinary bladder.   REPRODUCTIVE ORGANS: Calcification of the arcuate arteries within the uterus is seen. There is no uterine enlargement or adnexal mass.   BOWEL: A rectal balloon is seen. There is no abnormal distention of the intestinal tract.   VESSELS: There is atherosclerosis of the thoracoabdominal aorta and iliac arteries with calcification in the walls of the celiac, splenic, hepatic, and mesenteric arteries.   PERITONEUM/RETROPERITONEUM/LYMPH NODES: There is a minimal amount of ascites seen about the liver and spleen with mild presacral edema noted.   ABDOMINAL WALL: There is anasarca involving the soft tissues of the abdomen and pelvis. Postoperative change from ventral hernia repair is seen.   BONES: Bilateral sacroiliitis is seen. There is lumbar dextroscoliosis. Chronic rotator cuff tear is present bilaterally with osteoarthritis of both shoulders, right greater than left.       Chest 1.  Moderately small bilateral pleural effusions with left upper lobe atelectasis and compressive atelectasis seen posteriorly in right lower lobe. A left lower lobe pneumonia is identified. There is extensive airspace consolidation within left lower lobe with air bronchograms observed. Mild mediastinal reactive adenopathy is present as well.   Abdomen-Pelvis 1.  Cholelithiasis without evidence of cholecystitis. 2. Small amount of ascites with mild presacral edema and anasarca within the soft tissues of the abdominal wall. 3. Renal atrophy with extensive vascular calcifications.     MACRO: None   Signed by: Maria Garcia 1/21/2024 8:30 AM Dictation  workstation:   [id number]    CT head wo IV contrast    Result Date: 1/21/2024  Interpreted By:  Maria Garcia, STUDY: CT HEAD WO IV CONTRAST 1/20/2024 11:42 pm   INDICATION: Signs/Symptoms:mental status changes   COMPARISON: 12/11/2023   ACCESSION NUMBER(S): [id number]   ORDERING CLINICIAN: BAN GIFFORD   TECHNIQUE: Unenhanced axial images of the brain are completed.   All CT examinations are performed with 1 or more of the following dose reduction techniques: Automated exposure control, adjustment of mA and/or kv according to patient's size, or use of iterative reconstruction techniques.   FINDINGS: Helical unenhanced axial images of the brain demonstrate a mild to moderate degree of ventricular enlargement with proportionate widening of the sulci and sylvian fissures. There is no midline shift, mass effect, extra-axial fluid collection, or acute intracranial hemorrhage. There is diminished density seen in the periventricular white matter indicating chronic microvascular ischemic disease. There is calcified plaque seen within the distal vertebral and internal carotid arteries bilaterally. No calvarial abnormality is seen.       Atrophy and chronic microvascular ischemic disease without acute intracranial process.   Signed by: Maria Garcia 1/21/2024 8:09 AM Dictation workstation:   [id number]    XR chest 1 view    Result Date: 1/20/2024  Interpreted By:  Brendon Perez, STUDY: XR CHEST 1 VIEW; 1/20/2024 5:03 pm   INDICATION: CLINICAL INFORMATION: Signs/Symptoms:Insertion right IJ central line, also left thoracentesis.   COMPARISON: 01/19/2024 at 1338 hours   ACCESSION NUMBER(S): [id number]   ORDERING CLINICIAN: BOZENA ANTONIO   TECHNIQUE: Portable chest one view.   FINDINGS: The cardiac size is indeterminate in view of the AP projection. There is no change in the right-sided dual port central venous catheter. There is a new right internal jugular venous catheter present with the tip at approximately same level as  the dual port central venous catheter, overlying the mid to lower right atrium. There is no evidence for pneumothorax. Patient has a history of left thoracentesis without evidence for pneumothorax on the left. There is bilateral basilar alveolar infiltrate and effusions. Left effusion is decreased compared to the study from 1 day earlier.   Surgical clips are identified within left chest wall. Endovascular stent is identified in the distribution of the left subclavian artery.       1. Status post right-sided central venous catheter placement as described above with the tip overlying the mid to caudal aspect of the right atrium. There is no evidence for pneumothorax. 2. No evidence for left-sided pneumothorax after left-sided thoracentesis. Decrease in the left effusion. 3. Bilateral basilar infiltrates and effusions are present. Follow-up to assure complete clearing is suggested.   MACRO: none   Signed by: Brendon Perez 1/20/2024 5:11 PM Dictation workstation:   [id number]    XR chest 1 view    Result Date: 1/19/2024  Interpreted By:  Real Mendieta, STUDY: XR CHEST 1 VIEW; 1/19/2024 1:38 pm   INDICATION: Signs/Symptoms:dyspnea.   COMPARISON: 12/26/2023   ACCESSION NUMBER(S): [id number]   ORDERING CLINICIAN: EMELY GOLDSMITH   TECHNIQUE: 1 view of the chest was performed.   FINDINGS: There may be a minimal right pleural effusion. Slight prominence of the interstitium otherwise the right lung is clear. Improved appearance of the left lung with improved aeration of the right upper lobe. There is opacification of the left lower lobe possibly due to large pleural effusion which is similar to the prior study. No pneumothorax. Right-sided dual lumen central line catheter with tip at the proximal atrium. The cardiomediastinal silhouette is within normal limits. Left-sided subclavian stents are again noted.       Improved aeration and appearance of the left lung superiorly however there is a persistent large left pleural  effusion and opacification of the left lower lobe.   Signed by: Real Mendieta 1/19/2024 1:44 PM Dictation workstation:   [id number]    Electrocardiogram, 12-lead PRN ACS symptoms    Result Date: 1/7/2024  Sinus rhythm Right bundle branch block Septal infarct (cited on or before 06-DEC-2023) Abnormal ECG When compared with ECG of 06-DEC-2023 13:06, Questionable change in initial forces of Septal leads Confirmed by Herminio Lemus ([id number]) on 1/7/2024 11:04:01 AM     Assessment/Plan     Septic shock-on pressors-resolving  Left-sided pleural effusion   Left lower lobe pneumonia-treated  Proteus urinary tract infection-treated  History of MRSA endocarditis  History of C. 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

## 2024-02-03 NOTE — PROGRESS NOTES
[facility] Critical Care Medicine       Date:  2/3/2024  Patient:  Ayanna Gross  YOB: 1950  MRN:  [id number]   Admit Date:  1/19/2024  ========================================================================================================    Chief Complaint   Patient presents with    Shortness of Breath     Patient with SOB and fever. Is very lethargic. Dialysis MWF but did not go today dt SOB and fever         History of Present Illness:  Ayanna Gross is a 73 y.o. year old female patient with Past Medical History of  atrial fibrillation, end-stage renal disease, C. difficile infection, moderate sized recurrent left pleural effusions, and MRSA bacteremia with mitral valve vegetation admitted to ICU for management of septic shock.       Patient presented with anemia, fever worsening sacral and lower extremity wounds and concerns from the nursing facility for left pneumonia. Patient presents to the emergency department found to be febrile she was hypotensive but responded to 500 cc fluid bolus she was found to have a hemoglobin of 6 she was begun and ordered for a single unit of blood transfusion will actually provide 2 units of packed cells additional IV fluids to complete her volume resuscitation given her fever clinical evidence of hypovolemia and infection with an elevated lactate of 2.3 she received a total of approximately 1800 mL of fluid she is DNR/DNI by advanced directives but ICU level care is acceptable her granddaughter questions disorder but it was confirmed and signed by her  who is her POA.  She will be admitted to the intensive care unit given her frail compromised state and high risk of morbidity and mortality necessitating IV antibiotics fluids blood transfusion and ultimately will receive dialysis likely tomorrow wound care consultation to general surgery has been requested as well as consultation with the intensivist infectious disease and nephrology wounds are cultured C.  difficile is requested she remains on oral vancomycin for suppression given recent C. difficile infection in December she is initiated on pharmacy dosing of vancomycin and Zosyn 2.25 g IV every 6 hours. She continues on Eliquis at this time for DVT prophylaxis as well as atrial fibrillation.           Interval ICU Events:  1/20: Pt missed last dialysis appointment, no signs of urgent need today, but nephrology Dr. Cabello is following. Pt on high requirements of levophed. She is on broad spectrum abx for coverage currently ID Dr. Coulter following. Pt switches from being A&O x 3 and lethargy. Goal today is to get more central access, david, and thoracentesis.      1/21: Pt more lethargic this AM requiring uptitration of her Levophed to 0.28. Diagnostic and therapeutic thoracentesis yesterday over 1L of hazy straw color fluid. Currently still on vanc, zosyn, and doxy for abx. Potential dialysis this today, but no urgency.      1/22: Pt come in and out of lethargic states throughout the day. Still on high doses of levophed this morning. Per nephrologist Dr. Cabello titrate for an SBP goal of 70 and mentation. Left Pleural fluid analysis resulted as transudative per lights criteria. Tablo today per nephrology. ID managing with empiric coverage. Source control may not have been met. Podiatry, wound, and surgery to evaluate multiple wounds over the body.      1/23: Pt received dialysis via tablo yesterday. Very similar status as yesterday from lethargic states to well mentation. Patient remains on Levophed still at 0.36 with empiric coverage with vanc + zosyn & doxy. Increase in patients total bilirubin today 0.9-1.6. Consulting services include: Wound, Vascular, Podiatry, Acute surgery, Palliative, Infectious Disease, and Nephrology.      1/24: Patient stable overnight, norepinephrine weaned down to 0.16 with stable pressures and new goal of systolics > 70     1/25: Patient resting comfortably in bed this morning  requiring 0.05 of Levophed for systolic goal of 70. She is more alert than prior and complains of less pain. She will receive Tablo today with 1.5L of fluid removed per Nephrology.      1/26: Patient resting comfortably in bed this morning requiring 0.06 Levophed and midodrine 15 mg TID. Tablo yesterday with 1.5L removed. Empiric abx stopped 2 days ago. SLP signed off with recommendations.       1/27: Patient resting comfortable in bed this morning requiring 0.06 of levophed and midodrine 15 mg TID. Potential Tablo today. No acute events overnight. Patient remains at baseline.      1/29: No acute events overnight.  Pt resting comfortable in bed this morning requiring 0.04 of levophed and midodrine 15mg TID. Plan for dialysis via tablo today per nephrology.       1/30: Pt requiring increased levophed overnight at .12 and down do .09 this morning.  Dialysis via tablo yesterday with -1L.  Pt started on fludrocortisone 0.1 tid.  Spent extensive time with the patient discussing goals of care and plan for a family meeting tomorrow.      1/31: Pt maintained on 0.09 of levophed overnight.  Plan for dialysis via tablo today.  Continue goals of care discussion with a family meeting scheduled tonight at 1630.       2/1: Pt maintained on levophed overnight.  WBC down trending.  Continue goals of care discussion between LTAC and hospice.       2/2: Patient stable on levo 0.07 overnight, WBC stable, plan to discuss LTAC vs Hospice this PM    2/3: Patient decision to peruse LTACH after hospice information meeting, Precert placed 2/2, planned exchange of midline to PICC on monday    Medical History:  Past Medical History:   Diagnosis Date    Asthma     CAD (coronary artery disease)     CHF (congestive heart failure) (CMS/HCC)     Chronic kidney disease on chronic dialysis (CMS/[facility], located [facility])     Hypertension     Personal history of diseases of the blood and blood-forming organs and certain disorders involving the immune mechanism     History  of autoimmune disorder    Sleep apnea     Type 2 diabetes mellitus without complications (CMS/[facility]) 09/15/2022    Diabetes mellitus     Past Surgical History:   Procedure Laterality Date    CARPAL TUNNEL RELEASE  11/18/2013    Neuroplasty Decompression Median Nerve At Carpal Tunnel    HAND SURGERY  11/18/2013    Hand Surgery                                                                                                                                                          MASTECTOMY, PARTIAL  04/10/2014    Right Breast Partial Mastectomy    MR HEAD ANGIO WO IV CONTRAST  8/29/2023    MR HEAD ANGIO WO IV CONTRAST [facility]    OTHER SURGICAL HISTORY  11/18/2013    Breast Reconstruction With Implant Prosthesis    OTHER SURGICAL HISTORY  11/18/2013    Thyroid Surgery Substernal Thyroidectomy Partial    OTHER SURGICAL HISTORY  11/18/2013    Modified Radical Mastectomy Left Breast    OTHER SURGICAL HISTORY  04/04/2022    Carpal tunnel surgery    OTHER SURGICAL HISTORY  04/04/2022    Foot surgery    OTHER SURGICAL HISTORY  04/04/2022    Hernia repair    SENTINEL LYMPH NODE BIOPSY  04/10/2014    [place] Lymph Node Biopsy    TONSILLECTOMY  11/18/2013    Tonsillectomy    UMBILICAL HERNIA REPAIR  11/18/2013    Umbilical Hernia Repair    US GUIDED PERCUTANEOUS PLACEMENT  6/29/2023    US GUIDED PERCUTANEOUS PLACEMENT [facility]     Medications Prior to Admission   Medication Sig Dispense Refill Last Dose    apixaban (Eliquis) 2.5 mg tablet Take 1 tablet (2.5 mg) by mouth 2 times a day.       blood sugar diagnostic strip 1 x daily e11.65 for 90 days       blood-glucose sensor (Dexcom G6 Sensor) device Check blood sugar when needed and as instructed 6 each 3     doxycycline (Vibramycin) 100 mg capsule Take 1 capsule (100 mg) by mouth once daily. Take with at least 8 ounces (large glass) of water, do not lie down for 30 minutes after Do not start before January 6, 2024.       escitalopram (Lexapro) 10 mg  tablet 1 tablet Orally Once a day for 30 day(s)       febuxostat (Uloric) 40 mg tablet TAKE ONE TABLET BY MOUTH EVERY OTHER DAY       fenofibrate (Triglide) 160 mg tablet Take 1 tablet (160 mg) by mouth once daily.       gabapentin (Neurontin) 100 mg capsule Take 1 capsule (100 mg) by mouth 2 times a day.       insulin lispro (HumaLOG) 100 unit/mL injection Inject 0-0.1 mL (0-10 Units) under the skin 3 times a day with meals. Take as directed per insulin instructions.       midodrine (Proamatine) 5 mg tablet Take 3 tablets (15 mg) by mouth 3 times a day with meals.       nystatin (Mycostatin) 100,000 unit/gram powder APPLY 1 GRAM TO SKIN TWO TIMES A DAY 30 g 0     oxyCODONE-acetaminophen (Percocet) 5-325 mg tablet Take 1 tablet by mouth every 12 hours if needed for severe pain (7 - 10). 120 tablet 0     simvastatin (Zocor) 20 mg tablet TAKE 1 TABLET BY MOUTH DAILY 60 tablet 5      Patient has no known allergies.  Social History     Tobacco Use    Smoking status: Never    Smokeless tobacco: Never   Substance Use Topics    Alcohol use: Not Currently    Drug use: Never     Family History   Problem Relation Name Age of Onset    Lymphoma Mother      Prostate cancer Father          passed away [address] Medications:    norepinephrine, 0.01-3 mcg/kg/min, Last Rate: 0.11 mcg/kg/min (02/03/24 1238)          Current Facility-Administered Medications:     acetaminophen (Tylenol) tablet 1,000 mg, 1,000 mg, oral, TID, MADHURI Diaz-CNP, 1,000 mg at 02/03/24 0916    alteplase (Cathflo Activase) injection 2 mg, 2 mg, intra-catheter, PRN, Ronald Casey APRN-CNP    apixaban (Eliquis) tablet 2.5 mg, 2.5 mg, oral, BID, Judd Tavera, DO, 2.5 mg at 02/03/24 0916    benzocaine-menthoL (Dermoplast) topical spray, , Topical, 4x daily PRN, MADHURI Diaz-CNP    dextrose 10 % in water (D10W) infusion, 0.3 g/kg/hr, intravenous, Once PRN, Judd Tavera, DO    dextrose 50 % injection  25 g, 25 g, intravenous, q15 min PRN, Judd Tavera DO    doxycycline (Vibramycin) capsule 100 mg, 100 mg, oral, Daily, Judd Barreto MD, 100 mg at 02/03/24 1140    escitalopram (Lexapro) tablet 10 mg, 10 mg, oral, Nightly, Judd Tavera DO, 10 mg at 02/02/24 2113    gabapentin (Neurontin) capsule 100 mg, 100 mg, oral, TID, MADHURI Diaz-CNP, 100 mg at 02/03/24 0916    glucagon (Glucagen) injection 1 mg, 1 mg, intramuscular, q15 min PRN, Judd Tavera DO    heparin 1,000 unit/mL injection 2,000 Units, 2,000 Units, intra-catheter, After Dialysis, Saeed Mondragon MD, 1,600 Units at 02/02/24 1455    heparin 1,000 unit/mL injection 2,000 Units, 2,000 Units, intra-catheter, After Dialysis, Saeed Mondragon MD, 1,600 Units at 02/02/24 1454    heparin 1,000 unit/mL injection 2,000 Units, 2,000 Units, intra-catheter, After Dialysis, Saeed Mondragon MD    heparin 1,000 unit/mL injection 2,000 Units, 2,000 Units, intra-catheter, After Dialysis, Saeed Mondragon MD    insulin lispro (HumaLOG) injection 0-10 Units, 0-10 Units, subcutaneous, TID with meals, Judd Tavera DO, 2 Units at 02/02/24 1533    ipratropium-albuteroL (Duo-Neb) 0.5-2.5 mg/3 mL nebulizer solution 3 mL, 3 mL, nebulization, q6h while awake, Judd Barreto MD, 3 mL at 02/03/24 1123    lidocaine (Xylocaine) 10 mg/mL (1 %) injection 50 mg, 5 mL, infiltration, Once, MADHURI Mccray-CNP    midodrine (Proamatine) tablet 15 mg, 15 mg, oral, TID with meals, Judd Tavera DO, 15 mg at 02/03/24 1141    norepinephrine (Levophed) 8 mg in dextrose 5% 250 mL (0.032 mg/mL) infusion (premix), 0.01-3 mcg/kg/min, intravenous, Continuous, Yusuf De La Garza PA-C, Last Rate: 15.32 mL/hr at 02/03/24 1238, 0.11 mcg/kg/min at 02/03/24 1238    nystatin (Mycostatin) 100,000 unit/gram powder, , Topical, BID, Judd Tavera DO, Given at 02/02/24 2116    nystatin (Mycostatin) cream, , Topical, BID, Nickolas Gallego,  APRN-CNP, Given at 02/03/24 0931    ondansetron (Zofran) injection 4 mg, 4 mg, intravenous, q6h PRN, Ronald Casey, APRN-CNP, 4 mg at 02/03/24 0521    oxygen (O2) therapy, , inhalation, Continuous PRN - O2/gases, Judd Tavera DO, 2 L/min at 02/03/24 1131    pantoprazole (ProtoNix) EC tablet 40 mg, 40 mg, oral, Daily, 40 mg at 02/03/24 0916 **OR** pantoprazole (ProtoNix) injection 40 mg, 40 mg, intravenous, Daily, Yusuf De La Garza PA-C, 40 mg at 01/21/24 0824    sennosides-docusate sodium (Judy-Colace) 8.6-50 mg per tablet 1 tablet, 1 tablet, oral, Nightly PRN, Yusuf De La Garza PA-C    sodium chloride 3 % nebulizer solution 3 mL, 3 mL, nebulization, TID, Charly Grossman MD, 3 mL at 02/03/24 1123    vancomycin (Vancocin) capsule 125 mg, 125 mg, oral, BID, Iraida Lehman, APRN-CNP, 125 mg at 02/03/24 0919    zinc oxide 20 % ointment 1 Application, 1 Application, Topical, BID, Nickolas Gallego, APRN-CNP, 1 Application at 02/03/24 0932    Review of Systems:  14 point review of systems was completed and negative except for those specially mention in my HPI    Physical Exam:    Heart Rate:  []   Temp:  [35.6 °C (96 °F)-36.8 °C (98.2 °F)]   Resp:  [12-23]   BP: (46-90)/(19-70)   SpO2:  [89 %-100 %]     Physical Exam  Constitutional:       Appearance: She is ill-appearing.   HENT:      Mouth/Throat:      Mouth: Mucous membranes are moist.      Pharynx: Oropharynx is clear.   Eyes:      Pupils: Pupils are equal, round, and reactive to light.   Cardiovascular:      Rate and Rhythm: Normal rate and regular rhythm.      Pulses: Normal pulses.   Pulmonary:      Effort: Pulmonary effort is normal.   Abdominal:      General: Abdomen is flat.      Palpations: Abdomen is soft.   Musculoskeletal:         General: Normal range of motion.      Cervical back: Normal range of motion.   Skin:     General: Skin is warm.      Capillary Refill: Capillary refill takes less than 2 seconds.   Neurological:      General: No focal  deficit present.      Mental Status: She is alert and oriented to person, place, and time.   Psychiatric:         Mood and Affect: Mood normal.         Objective:    I have reviewed all medications, laboratory results, and imaging pertinent for today's encounter.           Intake/Output Summary (Last 24 hours) at 2/3/2024 1407  Last data filed at 2/3/2024 1037  Gross per 24 hour   Intake 677.52 ml   Output 2220 ml   Net -1542.48 ml         Assessment/Plan:    I am currently managing this critically ill patient for the following problems:    #Septic Shock   #ESRD   #Chronic Hypotension   #Recurrent Pleural Effusions   #Vasculopathy   #Multiple Wounds with and with out infections  #Acute Metabolic Encephalopathy  #Metabolic lactic acidosis, resolved   #Hx of recurrent C. Diff infection  #Hx of Atrial fibrillation   #Hx of endocarditis   #Hx of T2 DM   #Hx of anemia of chronic disease     Neuro/Psych/Pain Ctrl/Sedation:  #Acute Metabolic Encephalopathy, resolved  -Pain Control: Tylenol for mild  -CAM Assessment every shift, neuro assessments q4   -Palliative on consult  -Continue home Lexapro and gabapentin  -Sleep/wake cycle hygiene  -Delirium precaution    Respiratory/ENT:  #Recurrent Pleural Effusions   -Currently on 2L NC, denies any shortness of breath  -Continuous pulse oximetry, maintain SpO2 >90%  -Thoracentesis 1/20 over 1L of hazy straw colored fluid, Lights criteria suggest Transudative    -Aggressive pulmonary/bronchial hygiene     Cardiovascular:  #Septic Shock  #Chronic Hypotension   #Vasculopathy   #Hx of Atrial fibrillation   #Hx of endocarditis    -Levophed titrate to maintain SBP > 70,  --Will need midline changed to PICC if plan to go LTACH  -Home midodrine 15 mg TID when mental status is appropriate   -Stopped Fludrocortisone 0.1 TID  -Continue home eliquis   -Continuous cardiac monitoring    GI:  -Regular diet adding in Magic cup TID and Christian BID   -Flexacil for stools   -BR with senna/colace PRN    -Protonix daily   -elevated alkaline phosphatase now downtrend ing, most likely in the setting of steroid use    Renal/Volume Status (Intra & Extravascular):  #ESRD   #Metabolic lactic acidosis, resolved   -Nephrology following for dialysis needs, recommending M/W/F  -Conservative fluid management  -Daily BMP, Replete electrolytes as indicated for ESRD      Endocrine  #Hx of T2 DM   -POCT glucose AC/HS  -SSI AC/HS    Infectious Disease:  #Multiple Wounds with and with out infections  #Hx of recurrent C. Diff infection  #Hx of endocarditis   -Doxycycline 100 mg every day for suppressive therapy   -Vancomycin PO for C. Diff prophylaxis   -ID following   -C. Diff and MRSA PCR negative   -Blood cultures from 1/23, negative   -Urine culture from 1/19, positive for proteus mirabilis     Heme/Onc:  #Hx of anemia of chronic disease   -monitor for s/s anemia  -transfuse for hgb <7  -daily CBC  -Duplex upper extremity US negative for UE DVT    OBGYN/MSK:  #Multiple Wounds with and with out infections  -Surgery consulted   -Wound care consulted for dressing other wounds   -Podiatry consulted  -XR Left ankle not suspicious for osteomyelitis   -XR Right ankle and tib/fib not suspicious for osteomyelitis   -Continue Uloric for gout prophylaxis   -padded pressure points  -ICU skin protocol    Ethics/Code Status:  Full Code     :  DVT Prophylaxis: Home elequis  GI Prophylaxis: PPI  Bowel Regimen: Senna  Diet: Regular  CVC: None  Chandni: None  Amos: None  Restraints: None  Dispo: Will remain in ICU, plan for discharge to LTACH when placement completed    Critical Care Time:  35 minutes spent in preparing to see patient (I.e. review of medical records), evaluation of diagnostics (I.e. labs, imaging, etc.), documentation, discussing plan of care with patient/ family/ caregiver, and/ or coordination of care with multidisciplinary team. Time does not include completion of procedure time.      Ronald Casey, APRN-CNP

## 2024-02-03 NOTE — PROGRESS NOTES
Ayanna Gross is a 73 y.o. female on day 15 of admission presenting with Septic shock (CMS/HCC).    Chart reviewed and discussion conducted with patient's nurse and team.  Hospice meeting conducted yesterday but the pt/family has elected not to enroll at this time.  She is planning discharge to LTAC instead.     Continues in aggressive treatment model of care at this time.  Goals of care established.  Will follow on as needed basis only, so please call us back if acute palliative needs arise.       Mee Doe, APRN-CNP

## 2024-02-03 NOTE — CARE PLAN
Problem: Pain  Goal: My pain/discomfort is manageable  Outcome: Progressing  Flowsheets (Taken 2/2/2024 1928)  Resident's pain/discomfort is manageable:   Include resident/family/caregiver in decisions related to pain management   Offer non-pharmacological pain management interventions   Administer pain medication prior to activities that may trigger pain   Identify and avoid pain triggers     Problem: Safety  Goal: Patient will be injury free during hospitalization  Outcome: Progressing  Goal: I will remain free of falls  Outcome: Progressing     Problem: Daily Care  Goal: Daily care needs are met  Outcome: Progressing  Flowsheets (Taken 2/2/2024 1928)  Daily care needs are met:   Assess and monitor ability to perform self care and identify potential discharge needs   Assess skin integrity/risk for skin breakdown   Encourage independent activity per ability   Provide mouth care   Include patient/family/caregiver in decisions related to daily care   Assist patient with activities of daily living as needed     Problem: Psychosocial Needs  Goal: Demonstrates ability to cope with hospitalization/illness  Outcome: Progressing  Flowsheets (Taken 2/1/2024 1837)  Demonstrates ability to cope with hospitalization/illness:   Encourage verbalization of feelings/concerns/expectations   Provide low-stimulation environment as needed   Assist resident to identify and practice own strengths and abilities   Encourage resident to set and complete small goals for self   Encourage participation in diversional activities   Reinforce positive adaptation of new coping behaviors   Include resident/family/caregiver in decisions related to psychosocial needs  Goal: Collaborate with me, my family, and caregiver to identify my specific goals  Outcome: Progressing     Problem: Discharge Barriers  Goal: My discharge needs are met  Outcome: Progressing  Flowsheets (Taken 2/2/2024 1928)  Resident's discharge needs are met:   Identify potential  discharge barriers on admission and throughout stay   Involve resident/family/caregiver in discharge planning process     Problem: Fall/Injury  Goal: Not fall by end of shift  Outcome: Progressing  Goal: Be free from injury by end of the shift  Outcome: Progressing  Goal: Verbalize understanding of personal risk factors for fall in the hospital  Outcome: Progressing  Goal: Verbalize understanding of risk factor reduction measures to prevent injury from fall in the home  Outcome: Progressing  Goal: Pace activities to prevent fatigue by end of the shift  Outcome: Progressing     Problem: Skin  Goal: Decreased wound size/increased tissue granulation at next dressing change  Outcome: Progressing  Flowsheets (Taken 2/2/2024 1928)  Decreased wound size/increased tissue granulation at next dressing change:   Promote sleep for wound healing   Utilize specialty bed per algorithm   Protective dressings over bony prominences  Goal: Participates in plan/prevention/treatment measures  Outcome: Progressing  Flowsheets (Taken 2/2/2024 1928)  Participates in plan/prevention/treatment measures: Elevate heels  Goal: Prevent/manage excess moisture  Outcome: Progressing  Flowsheets (Taken 2/2/2024 1928)  Prevent/manage excess moisture:   Cleanse incontinence/protect with barrier cream   Moisturize dry skin  Goal: Prevent/minimize sheer/friction injuries  Outcome: Progressing  Flowsheets (Taken 2/1/2024 1837)  Prevent/minimize sheer/friction injuries:   Complete micro-shifts as needed if patient unable. Adjust patient position to relieve pressure points, not a full turn   HOB 30 degrees or less   Increase activity/out of bed for meals   Turn/reposition every 2 hours/use positioning/transfer devices  Goal: Promote/optimize nutrition  Outcome: Progressing  Flowsheets (Taken 2/1/2024 1837)  Promote/optimize nutrition:   Consume > 50% meals/supplements   Offer water/supplements/favorite foods   Monitor/record intake including meals  Goal:  Promote skin healing  Outcome: Progressing  Flowsheets (Taken 2/1/2024 1837)  Promote skin healing:   Assess skin/pad under line(s)/device(s)   Ensure correct size (line/device) and apply per  instructions   Protective dressings over bony prominences   Rotate device position/do not position patient on device   Turn/reposition every 2 hours/use positioning/transfer devices     Problem: Pain  Goal: Takes deep breaths with improved pain control throughout the shift  Outcome: Progressing  Goal: Turns in bed with improved pain control throughout the shift  Outcome: Progressing  Goal: Walks with improved pain control throughout the shift  Outcome: Progressing  Goal: Performs ADL's with improved pain control throughout shift  Outcome: Progressing  Goal: Participates in PT with improved pain control throughout the shift  Outcome: Progressing  Goal: Free from opioid side effects throughout the shift  Outcome: Progressing  Goal: Free from acute confusion related to pain meds throughout the shift  Outcome: Progressing     Problem: Respiratory  Goal: Clear secretions with interventions this shift  Outcome: Progressing  Flowsheets (Taken 2/1/2024 1837)  Clear secretions with interventions this shift:   Encourage/provide pulmonary hygiene/secretion clearance   Med administration/monitoring of effect  Goal: Minimize anxiety/maximize coping throughout shift  Outcome: Progressing  Flowsheets (Taken 2/1/2024 1837)  Minimize anxiety/maximize coping throughout shift:   Med administration/monitoring of effect   Monitor pain/anxiety level  Goal: Minimal/no exertional discomfort or dyspnea this shift  Outcome: Progressing  Flowsheets (Taken 1/31/2024 1748 by Elzbieta Alejandro RN)  Minimal/no exertional discomfort or dyspnea this shift: Positioning to promote ventilation/comfort  Goal: No signs of respiratory distress (eg. Use of accessory muscles. Peds grunting)  Outcome: Progressing  Goal: Patent airway maintained this  shift  Outcome: Progressing  Goal: Tolerate pulmonary toileting this shift  Outcome: Progressing  Flowsheets (Taken 2/2/2024 1928)  Tolerate pulmonary toileting this shift: Positioning to promote ventilation/comfort  Goal: Verbalize decreased shortness of breath this shift  Outcome: Progressing  Flowsheets (Taken 2/1/2024 1837)  Verbalize decreased shortness of breath this shift: Encourage/provide pulmonary hygiene/secretion clearance  Goal: Wean oxygen to maintain O2 saturation per order/standard this shift  Outcome: Progressing  Flowsheets (Taken 2/1/2024 1837)  Wean oxygen to maintain O2 saturation per order/standard this shift: Encourage activity/mobility  Goal: Increase self care and/or family involvement in next 24 hours  Outcome: Progressing  Flowsheets (Taken 2/2/2024 1928)  Increase self care and/or family involvement in next 24 hours: Encourage activity/mobility     Problem: Diabetes  Goal: Maintain electrolyte levels within acceptable range throughout shift  Outcome: Progressing  Flowsheets (Taken 2/1/2024 1837)  Maintain electrolyte levels within acceptable range throughout shift:   Monitor urine output   Med administration/monitoring of effect  Goal: Maintain glucose levels >70mg/dl to <250mg/dl throughout shift  Outcome: Progressing  Flowsheets (Taken 2/2/2024 1928)  Maintain glucose levels >70mg/dl to <250mg/dl throughout shift: Med administration/monitoring of effect  Goal: No changes in neurological exam by end of shift  Outcome: Progressing  Flowsheets (Taken 2/2/2024 1928)  No changes in neurological exam by end of shift: Complete frequent neurological assessments  Goal: Learn about and adhere to nutrition recommendations by end of shift  Outcome: Progressing  Flowsheets (Taken 2/2/2024 1928)  Learn about and adhere to nutrition recommendations by end of shift: Ensure/encourage compliance with appropriate diet  Goal: Vital signs within normal range for age by end of shift  Outcome:  Progressing  Flowsheets (Taken 2/2/2024 1928)  Vital signs within normal range for age by end of shift: Med administration/monitoring of effect  Goal: Increase self care and/or family involovement by end of shift  Outcome: Progressing  Goal: Receive DSME education by end of shift  Outcome: Progressing     Problem: Discharge Planning  Goal: Discharge to home or other facility with appropriate resources  Outcome: Progressing  Flowsheets (Taken 2/1/2024 1837)  Discharge to home or other facility with appropriate resources:   Identify barriers to discharge with patient and caregiver   Arrange for needed discharge resources and transportation as appropriate   Identify discharge learning needs (meds, wound care, etc)   Arrange for interpreters to assist at discharge as needed   Refer to discharge planning if patient needs post-hospital services based on physician order or complex needs related to functional status, cognitive ability or social support system     Problem: Chronic Conditions and Co-morbidities  Goal: Patient's chronic conditions and co-morbidity symptoms are monitored and maintained or improved  Outcome: Progressing  Flowsheets (Taken 2/1/2024 1837)  Care Plan - Patient's Chronic Conditions and Co-Morbidity Symptoms are Monitored and Maintained or Improved:   Monitor and assess patient's chronic conditions and comorbid symptoms for stability, deterioration, or improvement   Collaborate with multidisciplinary team to address chronic and comorbid conditions and prevent exacerbation or deterioration   Update acute care plan with appropriate goals if chronic or comorbid symptoms are exacerbated and prevent overall improvement and discharge   The patient's goals for the shift include maintain comfort    The clinical goals for the shift include stable BP during dialysis      Remained stable clinically on levophed. Discussed discharge planning. Denied pain.

## 2024-02-04 NOTE — PROGRESS NOTES
[facility] Critical Care Medicine       Date:  2/4/2024  Patient:  Ayanna Gross  YOB: 1950  MRN:  [id number]   Admit Date:  1/19/2024  ========================================================================================================    Chief Complaint   Patient presents with    Shortness of Breath     Patient with SOB and fever. Is very lethargic. Dialysis MWF but did not go today dt SOB and fever         History of Present Illness:  Ayanna Gross is a 73 y.o. year old female patient with Past Medical History of  atrial fibrillation, end-stage renal disease, C. difficile infection, moderate sized recurrent left pleural effusions, and MRSA bacteremia with mitral valve vegetation admitted to ICU for management of septic shock.       Patient presented with anemia, fever worsening sacral and lower extremity wounds and concerns from the nursing facility for left pneumonia. Patient presents to the emergency department found to be febrile she was hypotensive but responded to 500 cc fluid bolus she was found to have a hemoglobin of 6 she was begun and ordered for a single unit of blood transfusion will actually provide 2 units of packed cells additional IV fluids to complete her volume resuscitation given her fever clinical evidence of hypovolemia and infection with an elevated lactate of 2.3 she received a total of approximately 1800 mL of fluid she is DNR/DNI by advanced directives but ICU level care is acceptable her granddaughter questions disorder but it was confirmed and signed by her  who is her POA.  She will be admitted to the intensive care unit given her frail compromised state and high risk of morbidity and mortality necessitating IV antibiotics fluids blood transfusion and ultimately will receive dialysis likely tomorrow wound care consultation to general surgery has been requested as well as consultation with the intensivist infectious disease and nephrology wounds are cultured C.  difficile is requested she remains on oral vancomycin for suppression given recent C. difficile infection in December she is initiated on pharmacy dosing of vancomycin and Zosyn 2.25 g IV every 6 hours. She continues on Eliquis at this time for DVT prophylaxis as well as atrial fibrillation.            Interval ICU Events:  1/20: Pt missed last dialysis appointment, no signs of urgent need today, but nephrology Dr. Cabello is following. Pt on high requirements of levophed. She is on broad spectrum abx for coverage currently ID Dr. Coulter following. Pt switches from being A&O x 3 and lethargy. Goal today is to get more central access, david, and thoracentesis.      1/21: Pt more lethargic this AM requiring uptitration of her Levophed to 0.28. Diagnostic and therapeutic thoracentesis yesterday over 1L of hazy straw color fluid. Currently still on vanc, zosyn, and doxy for abx. Potential dialysis this today, but no urgency.      1/22: Pt come in and out of lethargic states throughout the day. Still on high doses of levophed this morning. Per nephrologist Dr. Cabello titrate for an SBP goal of 70 and mentation. Left Pleural fluid analysis resulted as transudative per lights criteria. Tablo today per nephrology. ID managing with empiric coverage. Source control may not have been met. Podiatry, wound, and surgery to evaluate multiple wounds over the body.      1/23: Pt received dialysis via tablo yesterday. Very similar status as yesterday from lethargic states to well mentation. Patient remains on Levophed still at 0.36 with empiric coverage with vanc + zosyn & doxy. Increase in patients total bilirubin today 0.9-1.6. Consulting services include: Wound, Vascular, Podiatry, Acute surgery, Palliative, Infectious Disease, and Nephrology.      1/24: Patient stable overnight, norepinephrine weaned down to 0.16 with stable pressures and new goal of systolics > 70     1/25: Patient resting comfortably in bed this morning  requiring 0.05 of Levophed for systolic goal of 70. She is more alert than prior and complains of less pain. She will receive Tablo today with 1.5L of fluid removed per Nephrology.      1/26: Patient resting comfortably in bed this morning requiring 0.06 Levophed and midodrine 15 mg TID. Tablo yesterday with 1.5L removed. Empiric abx stopped 2 days ago. SLP signed off with recommendations.       1/27: Patient resting comfortable in bed this morning requiring 0.06 of levophed and midodrine 15 mg TID. Potential Tablo today. No acute events overnight. Patient remains at baseline.      1/29: No acute events overnight.  Pt resting comfortable in bed this morning requiring 0.04 of levophed and midodrine 15mg TID. Plan for dialysis via tablo today per nephrology.       1/30: Pt requiring increased levophed overnight at .12 and down do .09 this morning.  Dialysis via tablo yesterday with -1L.  Pt started on fludrocortisone 0.1 tid.  Spent extensive time with the patient discussing goals of care and plan for a family meeting tomorrow.      1/31: Pt maintained on 0.09 of levophed overnight.  Plan for dialysis via tablo today.  Continue goals of care discussion with a family meeting scheduled tonight at 1630.       2/1: Pt maintained on levophed overnight.  WBC down trending.  Continue goals of care discussion between LTAC and hospice.       2/2: Patient stable on levo 0.07 overnight, WBC stable, plan to discuss LTAC vs Hospice this PM     2/3: Patient decision to peruse LTACH after hospice information meeting, Precert placed 2/2, planned exchange of midline to PICC on monday    Medical History:  Past Medical History:   Diagnosis Date    Asthma     CAD (coronary artery disease)     CHF (congestive heart failure) (CMS/HCC)     Chronic kidney disease on chronic dialysis (CMS/[facility])     Hypertension     Personal history of diseases of the blood and blood-forming organs and certain disorders involving the immune mechanism      History of autoimmune disorder    Sleep apnea     Type 2 diabetes mellitus without complications (CMS/[facility]) 09/15/2022    Diabetes mellitus     Past Surgical History:   Procedure Laterality Date    CARPAL TUNNEL RELEASE  11/18/2013    Neuroplasty Decompression Median Nerve At Carpal Tunnel    HAND SURGERY  11/18/2013    Hand Surgery                                                                                                                                                          MASTECTOMY, PARTIAL  04/10/2014    Right Breast Partial Mastectomy    MR HEAD ANGIO WO IV CONTRAST  8/29/2023    MR HEAD ANGIO WO IV CONTRAST [facility]    OTHER SURGICAL HISTORY  11/18/2013    Breast Reconstruction With Implant Prosthesis    OTHER SURGICAL HISTORY  11/18/2013    Thyroid Surgery Substernal Thyroidectomy Partial    OTHER SURGICAL HISTORY  11/18/2013    Modified Radical Mastectomy Left Breast    OTHER SURGICAL HISTORY  04/04/2022    Carpal tunnel surgery    OTHER SURGICAL HISTORY  04/04/2022    Foot surgery    OTHER SURGICAL HISTORY  04/04/2022    Hernia repair    SENTINEL LYMPH NODE BIOPSY  04/10/2014    [place] Lymph Node Biopsy    TONSILLECTOMY  11/18/2013    Tonsillectomy    UMBILICAL HERNIA REPAIR  11/18/2013    Umbilical Hernia Repair    US GUIDED PERCUTANEOUS PLACEMENT  6/29/2023    US GUIDED PERCUTANEOUS PLACEMENT [facility]     Medications Prior to Admission   Medication Sig Dispense Refill Last Dose    apixaban (Eliquis) 2.5 mg tablet Take 1 tablet (2.5 mg) by mouth 2 times a day.       blood sugar diagnostic strip 1 x daily e11.65 for 90 days       blood-glucose sensor (Dexcom G6 Sensor) device Check blood sugar when needed and as instructed 6 each 3     doxycycline (Vibramycin) 100 mg capsule Take 1 capsule (100 mg) by mouth once daily. Take with at least 8 ounces (large glass) of water, do not lie down for 30 minutes after Do not start before January 6, 2024.       escitalopram (Lexapro) 10  mg tablet 1 tablet Orally Once a day for 30 day(s)       febuxostat (Uloric) 40 mg tablet TAKE ONE TABLET BY MOUTH EVERY OTHER DAY       fenofibrate (Triglide) 160 mg tablet Take 1 tablet (160 mg) by mouth once daily.       gabapentin (Neurontin) 100 mg capsule Take 1 capsule (100 mg) by mouth 2 times a day.       insulin lispro (HumaLOG) 100 unit/mL injection Inject 0-0.1 mL (0-10 Units) under the skin 3 times a day with meals. Take as directed per insulin instructions.       midodrine (Proamatine) 5 mg tablet Take 3 tablets (15 mg) by mouth 3 times a day with meals.       nystatin (Mycostatin) 100,000 unit/gram powder APPLY 1 GRAM TO SKIN TWO TIMES A DAY 30 g 0     oxyCODONE-acetaminophen (Percocet) 5-325 mg tablet Take 1 tablet by mouth every 12 hours if needed for severe pain (7 - 10). 120 tablet 0     simvastatin (Zocor) 20 mg tablet TAKE 1 TABLET BY MOUTH DAILY 60 tablet 5      Patient has no known allergies.  Social History     Tobacco Use    Smoking status: Never    Smokeless tobacco: Never   Substance Use Topics    Alcohol use: Not Currently    Drug use: Never     Family History   Problem Relation Name Age of Onset    Lymphoma Mother      Prostate cancer Father          passed away [address] Medications:    norepinephrine, 0.01-3 mcg/kg/min, Last Rate: 0.09 mcg/kg/min (02/04/24 0621)          Current Facility-Administered Medications:     acetaminophen (Tylenol) tablet 650 mg, 650 mg, oral, q4h PRN, LILLIE Mccray    alteplase (Cathflo Activase) injection 2 mg, 2 mg, intra-catheter, PRN, LILLIE Mccray    apixaban (Eliquis) tablet 2.5 mg, 2.5 mg, oral, BID, Judd Tavera DO, 2.5 mg at 02/04/24 0818    benzocaine-menthoL (Dermoplast) topical spray, , Topical, 4x daily PRN, LILLIE Diaz    dextrose 10 % in water (D10W) infusion, 0.3 g/kg/hr, intravenous, Once PRN, Judd Tavera DO    dextrose 50 % injection 25 g, 25 g, intravenous, q15  min PRN, Judd Tavera DO    doxycycline (Vibramycin) capsule 100 mg, 100 mg, oral, Daily, Judd Barreto MD, 100 mg at 02/03/24 1140    escitalopram (Lexapro) tablet 10 mg, 10 mg, oral, Nightly, Judd Tavera DO, 10 mg at 02/03/24 2200    gabapentin (Neurontin) capsule 100 mg, 100 mg, oral, TID, MADHURI Diaz-CNP, 100 mg at 02/04/24 0817    glucagon (Glucagen) injection 1 mg, 1 mg, intramuscular, q15 min PRN, Judd Tavera DO    heparin 1,000 unit/mL injection 2,000 Units, 2,000 Units, intra-catheter, After Dialysis, Saeed Mondragon MD, 1,600 Units at 02/02/24 1455    heparin 1,000 unit/mL injection 2,000 Units, 2,000 Units, intra-catheter, After Dialysis, Saeed Mondragon MD, 1,600 Units at 02/02/24 1454    heparin 1,000 unit/mL injection 2,000 Units, 2,000 Units, intra-catheter, After Dialysis, Saeed Mondragon MD    heparin 1,000 unit/mL injection 2,000 Units, 2,000 Units, intra-catheter, After Dialysis, Saeed Mondragon MD    insulin lispro (HumaLOG) injection 0-10 Units, 0-10 Units, subcutaneous, TID with meals, Judd Tavera DO, 2 Units at 02/03/24 1710    ipratropium-albuteroL (Duo-Neb) 0.5-2.5 mg/3 mL nebulizer solution 3 mL, 3 mL, nebulization, q6h while awake, Judd Barreto MD, 3 mL at 02/04/24 0721    lidocaine (Xylocaine) 10 mg/mL (1 %) injection 50 mg, 5 mL, infiltration, Once, MADHURI Mccray-CNP    midodrine (Proamatine) tablet 15 mg, 15 mg, oral, TID with meals, Judd Tavera DO, 15 mg at 02/04/24 0819    norepinephrine (Levophed) 8 mg in dextrose 5% 250 mL (0.032 mg/mL) infusion (premix), 0.01-3 mcg/kg/min, intravenous, Continuous, Yusuf De La Garza PA-C, Last Rate: 12.54 mL/hr at 02/04/24 0621, 0.09 mcg/kg/min at 02/04/24 0621    nystatin (Mycostatin) 100,000 unit/gram powder, , Topical, BID, Judd Tavera DO, Given at 02/04/24 0823    nystatin (Mycostatin) cream, , Topical, BID, MADHURI Blair-CNP, Given at 02/04/24  0821    ondansetron (Zofran) injection 4 mg, 4 mg, intravenous, q6h PRN, Ronald Casey, APRN-CNP, 4 mg at 02/03/24 0521    oxygen (O2) therapy, , inhalation, Continuous PRN - O2/gases, Judd Tavera DO, 2 L/min at 02/03/24 1131    pantoprazole (ProtoNix) EC tablet 40 mg, 40 mg, oral, Daily, 40 mg at 02/04/24 0817 **OR** pantoprazole (ProtoNix) injection 40 mg, 40 mg, intravenous, Daily, Yusuf De La Garza PA-C, 40 mg at 01/21/24 0824    sennosides-docusate sodium (Judy-Colace) 8.6-50 mg per tablet 1 tablet, 1 tablet, oral, Nightly PRN, Yusuf De La Garza PA-C    sodium chloride 3 % nebulizer solution 3 mL, 3 mL, nebulization, TID, Charly Grossman MD, 3 mL at 02/04/24 0721    vancomycin (Vancocin) capsule 125 mg, 125 mg, oral, BID, Iraida Lehman, APRN-CNP, 125 mg at 02/04/24 0817    zinc oxide 20 % ointment 1 Application, 1 Application, Topical, BID, MADHURI Blair-CNP, 1 Application at 02/04/24 0821    Review of Systems:  14 point review of systems was completed and negative except for those specially mention in my HPI    Physical Exam:    Heart Rate:  []   Temp:  [36.1 °C (97 °F)-36.8 °C (98.2 °F)]   Resp:  [13-21]   BP: ()/(24-90)   Weight:  [103 kg (226 lb 6.6 oz)]   SpO2:  [88 %-98 %]     Physical Exam  HENT:      Nose: Nose normal.      Mouth/Throat:      Mouth: Mucous membranes are moist.      Pharynx: Oropharynx is clear.   Eyes:      Pupils: Pupils are equal, round, and reactive to light.   Cardiovascular:      Rate and Rhythm: Normal rate. Rhythm irregular.   Pulmonary:      Effort: Pulmonary effort is normal.   Abdominal:      General: Abdomen is flat.   Musculoskeletal:         General: Deformity present. Normal range of motion.      Cervical back: Normal range of motion.      Comments: Abscence of left hand first finger   Skin:     General: Skin is warm.      Capillary Refill: Capillary refill takes less than 2 seconds.      Comments: Skin tear healing to L hand  Wound to bilateral  feet and sacrum   Neurological:      General: No focal deficit present.      Mental Status: She is alert and oriented to person, place, and time.         Objective:    I have reviewed all medications, laboratory results, and imaging pertinent for today's encounter.           Intake/Output Summary (Last 24 hours) at 2/4/2024 0921  Last data filed at 2/4/2024 0621  Gross per 24 hour   Intake 684.23 ml   Output 401 ml   Net 283.23 ml         Assessment/Plan:    I am currently managing this critically ill patient for the following problems:    #Septic Shock   #ESRD   #Chronic Hypotension   #Recurrent Pleural Effusions   #Vasculopathy   #Multiple Wounds with and with out infections  #Acute Metabolic Encephalopathy  #Metabolic lactic acidosis, resolved   #Hx of recurrent C. Diff infection  #Hx of Atrial fibrillation   #Hx of endocarditis   #Hx of T2 DM   #Hx of anemia of chronic disease     Neuro/Psych/Pain Ctrl/Sedation:  #Acute Metabolic Encephalopathy, resolved  -Pain Control: Tylenol PRN  -CAM Assessment every shift, neuro assessments q4   -Palliative on consult  -Continue home Lexapro and gabapentin  -Sleep/wake cycle hygiene  -Delirium precaution    Respiratory/ENT:  #Recurrent Pleural Effusions   -Currently on 2L NC, denies any shortness of breath  -Continuous pulse oximetry, maintain SpO2 >90%  -Thoracentesis 1/20 over 1L of hazy straw colored fluid, Lights criteria suggest Transudative    -Aggressive pulmonary/bronchial hygiene     Cardiovascular:  #Septic Shock  #Chronic Hypotension   #Vasculopathy   #Hx of Atrial fibrillation   #Hx of endocarditis    -Levophed titrate to maintain SBP > 70,  --Will need midline changed to PICC if plan to go LTACH, infuse levo through dialysis port during transition  -Home midodrine 15 mg TID when mental status is appropriate   -Continue home eliquis   -Continuous cardiac monitoring    GI:  -Regular diet adding in Magic cup TID and Christian BID   -Flexacil for stools   -BR with  senna/colace PRN   -Protonix daily   -elevated alkaline phosphatase now downtrend ing, most likely in the setting of steroid use  -Started metamucil today    Renal/Volume Status (Intra & Extravascular):  #ESRD   #Metabolic lactic acidosis, resolved   -Nephrology following for dialysis needs, recommending M/W/F  -Conservative fluid management  -Daily BMP, Replete electrolytes as indicated for ESRD   -Planned tablo tomorrow    Endocrine  #Hx of T2 DM   -POCT glucose AC/HS  -SSI AC/HS    Infectious Disease:  #Multiple Wounds with and with out infections  #Hx of recurrent C. Diff infection  #Hx of endocarditis   -Doxycycline 100 mg every day for suppressive therapy   -Vancomycin PO for C. Diff prophylaxis   -ID following   -C. Diff and MRSA PCR negative   -Blood cultures from 1/23, negative   -Urine culture from 1/19, positive for proteus mirabilis     Heme/Onc:  #Hx of anemia of chronic disease   -monitor for s/s anemia  -transfuse for hgb <7  -daily CBC  -Duplex upper extremity US negative for UE DVT    OBGYN/MSK:  #Multiple Wounds with and with out infections  -Surgery consulted   -Wound care consulted for dressing other wounds   -Podiatry consulted  -XR Left ankle not suspicious for osteomyelitis   -XR Right ankle and tib/fib not suspicious for osteomyelitis   -Continue Uloric for gout prophylaxis   -padded pressure points  -ICU skin protocol    Ethics/Code Status:  Full Code      :  DVT Prophylaxis: Home elequis  GI Prophylaxis: PPI  Bowel Regimen: Senna  Diet: regular  CVC: None  Chandni: None  Amos: None  Restraints: None  Dispo: Will remain in ICU plan to discharge to LTACH when placment completed    Critical Care Time:  30 minutes spent in preparing to see patient (I.e. review of medical records), evaluation of diagnostics (I.e. labs, imaging, etc.), documentation, discussing plan of care with patient/ family/ caregiver, and/ or coordination of care with multidisciplinary team. Time does not  include completion of procedure time.      Ronald Casey, APRN-CNP

## 2024-02-04 NOTE — PROGRESS NOTES
CONSULT PROGRESS NOTES    SERVICE DATE: 2/4/2024   SERVICE TIME: 10:05 AM    CONSULTING SERVICE: Nephrology    ASSESSMENT AND PLAN   1.  End-stage renal disease  2.  Hypotension  3.  Hyponatremia  4.  Hypokalemia  5.  Anemia of chronic kidney disease     Dialysis tomorrow with [facility] again with low temperature and attempting gentle fluid removal.    Hyponatremia from volume overload in this patient with anuria.  Hypokalemia improved, use 3K bath tomorrow.  Hemoglobin is close to goal.  She remains dependent on norepinephrine infusion otherwise her blood pressure drops dangerously low.  She is preparing for LTAC for placement.  She had a meeting with hospice recently, but it does not sound like she will approach this disposition.    She does not need, nor would she derive any benefit from CRRT.  Magnesium balance has improved.  I have no specific objection to keeping the arterial line out.  I also have no specific objection to transition from a midline to a PICC.  Sounds like in efforts to keep her blood pressure high, she may need to have her norepinephrine infusion through her dialysis catheter while the midline is switched to a PICC.  While this is not ideal, it is probably necessary.    SUBJECTIVE  INTERVAL HPI: She has no new complaints.  She remains hypotensive and dependent on pressors.  She is having a considerable amount of stool output from her rectal tube.    MEDICATIONS:  apixaban, 2.5 mg, oral, BID  doxycylcine, 100 mg, oral, Daily  escitalopram, 10 mg, oral, Nightly  gabapentin, 100 mg, oral, TID  heparin, 2,000 Units, intra-catheter, After Dialysis  heparin, 2,000 Units, intra-catheter, After Dialysis  heparin, 2,000 Units, intra-catheter, After Dialysis  heparin, 2,000 Units, intra-catheter, After Dialysis  insulin lispro, 0-10 Units, subcutaneous, TID with meals  ipratropium-albuteroL, 3 mL, nebulization, q6h while awake  lidocaine, 5 mL, infiltration, Once  midodrine, 15 mg, oral, TID with  meals  nystatin, , Topical, BID  nystatin, , Topical, BID  pantoprazole, 40 mg, oral, Daily   Or  pantoprazole, 40 mg, intravenous, Daily  psyllium, 1 packet, oral, BID  sodium chloride, 3 mL, nebulization, TID  vancomycin, 125 mg, oral, BID  zinc oxide, 1 Application, Topical, BID       norepinephrine, 0.01-3 mcg/kg/min, Last Rate: 0.09 mcg/kg/min (02/04/24 0621)       PRN medications: acetaminophen, alteplase, benzocaine-menthoL, dextrose 10 % in water (D10W), dextrose, glucagon, ondansetron, oxygen, sennosides-docusate sodium     OBJECTIVE  PHYSICAL EXAM:   Heart Rate:  []   Temp:  [36.1 °C (97 °F)-36.8 °C (98.2 °F)]   Resp:  [13-21]   BP: ()/(24-90)   Weight:  [103 kg (226 lb 6.6 oz)]   SpO2:  [88 %-98 %]   Body mass index is 41.41 kg/m².  Chronically ill-appearing elderly white woman  Pale skin  Right-sided hand swelling  Left-sided finger amputation  Internal jugular tunneled hemodialysis catheter in place  There is no significant pretibial edema on both lower extremities  Soft abdomen  No Amos  No obvious joint deformities  Moist mucosa  Hearing seems to be intact  Phonation intact  Rectal tube in place    DATA:   Labs:  Results for orders placed or performed during the hospital encounter of 01/19/24 (from the past 96 hour(s))   POCT GLUCOSE   Result Value Ref Range    POCT Glucose 144 (H) 74 - 99 mg/dL   POCT GLUCOSE   Result Value Ref Range    POCT Glucose 187 (H) 74 - 99 mg/dL   POCT GLUCOSE   Result Value Ref Range    POCT Glucose 250 (H) 74 - 99 mg/dL   CBC   Result Value Ref Range    WBC 15.5 (H) 4.4 - 11.3 x10*3/uL    nRBC 0.0 0.0 - 0.0 /100 WBCs    RBC 3.13 (L) 4.00 - 5.20 x10*6/uL    Hemoglobin 9.2 (L) 12.0 - 16.0 g/dL    Hematocrit 29.6 (L) 36.0 - 46.0 %    MCV 95 80 - 100 fL    MCH 29.4 26.0 - 34.0 pg    MCHC 31.1 (L) 32.0 - 36.0 g/dL    RDW 25.0 (H) 11.5 - 14.5 %    Platelets 204 150 - 450 x10*3/uL   Comprehensive metabolic panel   Result Value Ref Range    Glucose 193 (H) 65 - 99  mg/dL    Sodium 131 (L) 133 - 145 mmol/L    Potassium 4.2 3.4 - 5.1 mmol/L    Chloride 99 97 - 107 mmol/L    Bicarbonate 21 (L) 24 - 31 mmol/L    Urea Nitrogen 10 8 - 25 mg/dL    Creatinine 2.40 (H) 0.40 - 1.60 mg/dL    eGFR 21 (L) >60 mL/min/1.73m*2    Calcium 8.3 (L) 8.5 - 10.4 mg/dL    Albumin 2.3 (L) 3.5 - 5.0 g/dL    Alkaline Phosphatase 239 (H) 35 - 125 U/L    Total Protein 4.9 (L) 5.9 - 7.9 g/dL    AST 32 5 - 40 U/L    Bilirubin, Total 1.1 0.1 - 1.2 mg/dL    ALT 13 5 - 40 U/L    Anion Gap 11 <=19 mmol/L   Magnesium   Result Value Ref Range    Magnesium 1.60 1.60 - 3.10 mg/dL   Phosphorus   Result Value Ref Range    Phosphorus 2.5 2.5 - 4.5 mg/dL   POCT GLUCOSE   Result Value Ref Range    POCT Glucose 191 (H) 74 - 99 mg/dL   POCT GLUCOSE   Result Value Ref Range    POCT Glucose 209 (H) 74 - 99 mg/dL   POCT GLUCOSE   Result Value Ref Range    POCT Glucose 132 (H) 74 - 99 mg/dL   Phosphorus   Result Value Ref Range    Phosphorus 2.9 2.5 - 4.5 mg/dL   CBC   Result Value Ref Range    WBC 13.6 (H) 4.4 - 11.3 x10*3/uL    nRBC 0.0 0.0 - 0.0 /100 WBCs    RBC 3.10 (L) 4.00 - 5.20 x10*6/uL    Hemoglobin 9.3 (L) 12.0 - 16.0 g/dL    Hematocrit 28.9 (L) 36.0 - 46.0 %    MCV 93 80 - 100 fL    MCH 30.0 26.0 - 34.0 pg    MCHC 32.2 32.0 - 36.0 g/dL    RDW 24.3 (H) 11.5 - 14.5 %    Platelets 227 150 - 450 x10*3/uL   Comprehensive metabolic panel   Result Value Ref Range    Glucose 189 (H) 65 - 99 mg/dL    Sodium 131 (L) 133 - 145 mmol/L    Potassium 4.2 3.4 - 5.1 mmol/L    Chloride 98 97 - 107 mmol/L    Bicarbonate 20 (L) 24 - 31 mmol/L    Urea Nitrogen 15 8 - 25 mg/dL    Creatinine 3.00 (H) 0.40 - 1.60 mg/dL    eGFR 16 (L) >60 mL/min/1.73m*2    Calcium 8.5 8.5 - 10.4 mg/dL    Albumin 2.3 (L) 3.5 - 5.0 g/dL    Alkaline Phosphatase 247 (H) 35 - 125 U/L    Total Protein 5.1 (L) 5.9 - 7.9 g/dL    AST 34 5 - 40 U/L    Bilirubin, Total 1.0 0.1 - 1.2 mg/dL    ALT 14 5 - 40 U/L    Anion Gap 13 <=19 mmol/L   Magnesium   Result Value  Ref Range    Magnesium 1.50 (L) 1.60 - 3.10 mg/dL   POCT GLUCOSE   Result Value Ref Range    POCT Glucose 177 (H) 74 - 99 mg/dL   POCT GLUCOSE   Result Value Ref Range    POCT Glucose 151 (H) 74 - 99 mg/dL   POCT GLUCOSE   Result Value Ref Range    POCT Glucose 158 (H) 74 - 99 mg/dL   POCT GLUCOSE   Result Value Ref Range    POCT Glucose 149 (H) 74 - 99 mg/dL   Phosphorus   Result Value Ref Range    Phosphorus 2.2 (L) 2.5 - 4.5 mg/dL   CBC   Result Value Ref Range    WBC 14.2 (H) 4.4 - 11.3 x10*3/uL    nRBC 0.0 0.0 - 0.0 /100 WBCs    RBC 2.93 (L) 4.00 - 5.20 x10*6/uL    Hemoglobin 8.7 (L) 12.0 - 16.0 g/dL    Hematocrit 27.7 (L) 36.0 - 46.0 %    MCV 95 80 - 100 fL    MCH 29.7 26.0 - 34.0 pg    MCHC 31.4 (L) 32.0 - 36.0 g/dL    RDW 24.1 (H) 11.5 - 14.5 %    Platelets 211 150 - 450 x10*3/uL   Comprehensive metabolic panel   Result Value Ref Range    Glucose 186 (H) 65 - 99 mg/dL    Sodium 135 133 - 145 mmol/L    Potassium 4.0 3.4 - 5.1 mmol/L    Chloride 100 97 - 107 mmol/L    Bicarbonate 21 (L) 24 - 31 mmol/L    Urea Nitrogen 11 8 - 25 mg/dL    Creatinine 2.20 (H) 0.40 - 1.60 mg/dL    eGFR 23 (L) >60 mL/min/1.73m*2    Calcium 8.4 (L) 8.5 - 10.4 mg/dL    Albumin 2.7 (L) 3.5 - 5.0 g/dL    Alkaline Phosphatase 216 (H) 35 - 125 U/L    Total Protein 5.4 (L) 5.9 - 7.9 g/dL    AST 24 5 - 40 U/L    Bilirubin, Total 1.2 0.1 - 1.2 mg/dL    ALT 11 5 - 40 U/L    Anion Gap 14 <=19 mmol/L   Magnesium   Result Value Ref Range    Magnesium 1.80 1.60 - 3.10 mg/dL   POCT GLUCOSE   Result Value Ref Range    POCT Glucose 151 (H) 74 - 99 mg/dL   POCT GLUCOSE   Result Value Ref Range    POCT Glucose 190 (H) 74 - 99 mg/dL   POCT GLUCOSE   Result Value Ref Range    POCT Glucose 152 (H) 74 - 99 mg/dL   CBC   Result Value Ref Range    WBC 14.1 (H) 4.4 - 11.3 x10*3/uL    nRBC 0.0 0.0 - 0.0 /100 WBCs    RBC 3.18 (L) 4.00 - 5.20 x10*6/uL    Hemoglobin 9.7 (L) 12.0 - 16.0 g/dL    Hematocrit 30.2 (L) 36.0 - 46.0 %    MCV 95 80 - 100 fL    MCH 30.5  26.0 - 34.0 pg    MCHC 32.1 32.0 - 36.0 g/dL    RDW 24.0 (H) 11.5 - 14.5 %    Platelets 243 150 - 450 x10*3/uL   Comprehensive metabolic panel   Result Value Ref Range    Glucose 164 (H) 65 - 99 mg/dL    Sodium 128 (L) 133 - 145 mmol/L    Potassium 4.3 3.4 - 5.1 mmol/L    Chloride 95 (L) 97 - 107 mmol/L    Bicarbonate 18 (L) 24 - 31 mmol/L    Urea Nitrogen 16 8 - 25 mg/dL    Creatinine 2.70 (H) 0.40 - 1.60 mg/dL    eGFR 18 (L) >60 mL/min/1.73m*2    Calcium 8.7 8.5 - 10.4 mg/dL    Albumin 2.7 (L) 3.5 - 5.0 g/dL    Alkaline Phosphatase 223 (H) 35 - 125 U/L    Total Protein 5.7 (L) 5.9 - 7.9 g/dL    AST 22 5 - 40 U/L    Bilirubin, Total 1.1 0.1 - 1.2 mg/dL    ALT 11 5 - 40 U/L    Anion Gap 15 <=19 mmol/L   Magnesium   Result Value Ref Range    Magnesium 1.90 1.60 - 3.10 mg/dL   POCT GLUCOSE   Result Value Ref Range    POCT Glucose 151 (H) 74 - 99 mg/dL         SIGNATURE: Saeed Mondragon MD PATIENT NAME: Ayanna Gross   DATE: February 4, 2024 MRN: [id number]   TIME: 10:05 AM PAGER: [phone]

## 2024-02-04 NOTE — CARE PLAN
The patient's goals for the shift include maintain comfort    The clinical goals for the shift include Pt remain hemodynamically stable      Problem: Pain  Goal: My pain/discomfort is manageable  Outcome: Progressing     Problem: Safety  Goal: Patient will be injury free during hospitalization  Outcome: Progressing  Goal: I will remain free of falls  Outcome: Progressing     Problem: Daily Care  Goal: Daily care needs are met  Outcome: Progressing     Problem: Psychosocial Needs  Goal: Demonstrates ability to cope with hospitalization/illness  Outcome: Progressing  Goal: Collaborate with me, my family, and caregiver to identify my specific goals  Outcome: Progressing     Problem: Discharge Barriers  Goal: My discharge needs are met  Outcome: Progressing     Problem: Fall/Injury  Goal: Not fall by end of shift  Outcome: Progressing  Goal: Be free from injury by end of the shift  Outcome: Progressing  Goal: Verbalize understanding of personal risk factors for fall in the hospital  Outcome: Progressing  Goal: Verbalize understanding of risk factor reduction measures to prevent injury from fall in the home  Outcome: Progressing  Goal: Pace activities to prevent fatigue by end of the shift  Outcome: Progressing     Problem: Skin  Goal: Decreased wound size/increased tissue granulation at next dressing change  Outcome: Progressing  Flowsheets (Taken 2/3/2024 1426 by Catarina Reynolds RN)  Decreased wound size/increased tissue granulation at next dressing change:   Promote sleep for wound healing   Utilize specialty bed per algorithm   Protective dressings over bony prominences  Goal: Participates in plan/prevention/treatment measures  Outcome: Progressing  Flowsheets (Taken 2/3/2024 1426 by Catarina Reynolds RN)  Participates in plan/prevention/treatment measures:   Discuss with provider PT/OT consult   Elevate heels  Goal: Prevent/manage excess moisture  Outcome: Progressing  Flowsheets (Taken 2/3/2024 1426 by Catarina  FLAQUITO Reynolds)  Prevent/manage excess moisture:   Cleanse incontinence/protect with barrier cream   Moisturize dry skin   Follow provider orders for dressing changes   Monitor for/manage infection if present  Goal: Prevent/minimize sheer/friction injuries  Outcome: Progressing  Flowsheets (Taken 2/3/2024 1426 by Catarina Reynolds RN)  Prevent/minimize sheer/friction injuries:   HOB 30 degrees or less   Turn/reposition every 2 hours/use positioning/transfer devices   Use pull sheet   Utilize specialty bed per algorithm  Goal: Promote/optimize nutrition  Outcome: Progressing  Flowsheets (Taken 2/3/2024 1426 by Catarina Reynolds RN)  Promote/optimize nutrition:   Assist with feeding   Consume > 50% meals/supplements   Monitor/record intake including meals   Offer water/supplements/favorite foods  Goal: Promote skin healing  Outcome: Progressing  Flowsheets (Taken 2/3/2024 1426 by Catarina Reynolds RN)  Promote skin healing:   Assess skin/pad under line(s)/device(s)   Ensure correct size (line/device) and apply per  instructions   Protective dressings over bony prominences   Turn/reposition every 2 hours/use positioning/transfer devices     Problem: Pain  Goal: Takes deep breaths with improved pain control throughout the shift  Outcome: Progressing  Goal: Turns in bed with improved pain control throughout the shift  Outcome: Progressing  Goal: Walks with improved pain control throughout the shift  Outcome: Progressing  Goal: Performs ADL's with improved pain control throughout shift  Outcome: Progressing  Goal: Participates in PT with improved pain control throughout the shift  Outcome: Progressing  Goal: Free from opioid side effects throughout the shift  Outcome: Progressing  Goal: Free from acute confusion related to pain meds throughout the shift  Outcome: Progressing     Problem: Respiratory  Goal: Clear secretions with interventions this shift  Outcome: Progressing  Goal: Minimize anxiety/maximize  coping throughout shift  Outcome: Progressing  Goal: Minimal/no exertional discomfort or dyspnea this shift  Outcome: Progressing  Goal: No signs of respiratory distress (eg. Use of accessory muscles. Peds grunting)  Outcome: Progressing  Goal: Patent airway maintained this shift  Outcome: Progressing  Goal: Tolerate pulmonary toileting this shift  Outcome: Progressing  Goal: Verbalize decreased shortness of breath this shift  Outcome: Progressing  Goal: Wean oxygen to maintain O2 saturation per order/standard this shift  Outcome: Progressing  Goal: Increase self care and/or family involvement in next 24 hours  Outcome: Progressing     Problem: Diabetes  Goal: Maintain electrolyte levels within acceptable range throughout shift  Outcome: Progressing  Goal: Maintain glucose levels >70mg/dl to <250mg/dl throughout shift  Outcome: Progressing  Goal: No changes in neurological exam by end of shift  Outcome: Progressing  Goal: Learn about and adhere to nutrition recommendations by end of shift  Outcome: Progressing  Goal: Vital signs within normal range for age by end of shift  Outcome: Progressing  Goal: Increase self care and/or family involovement by end of shift  Outcome: Progressing  Goal: Receive DSME education by end of shift  Outcome: Progressing     Problem: Discharge Planning  Goal: Discharge to home or other facility with appropriate resources  Outcome: Progressing     Problem: Chronic Conditions and Co-morbidities  Goal: Patient's chronic conditions and co-morbidity symptoms are monitored and maintained or improved  Outcome: Progressing

## 2024-02-04 NOTE — PROGRESS NOTES
Ayanna Gross is a 73 y.o. female on day 16 of admission presenting with Septic shock (CMS/HCC).    Subjective   Interval History:    at bedside  Afebrile, no chills  Remains on 2 liters oxygen  Discussed with nursing-no new complaints      Objective   Range of Vitals (last 24 hours)  Heart Rate:  []   Temp:  [36.1 °C (97 °F)-36.8 °C (98.2 °F)]   Resp:  [13-21]   BP: ()/(24-90)   Weight:  [103 kg (226 lb 6.6 oz)]   SpO2:  [88 %-98 %]   Daily Weight  02/04/24 : 103 kg (226 lb 6.6 oz)    Body mass index is 41.41 kg/m².    Physical Exam  Constitutional:       Appearance: Normal appearance.   HENT:      Head: Normocephalic and atraumatic.      Nose: Nose normal.   Eyes:      Extraocular Movements: Extraocular movements intact.      Conjunctiva/sclera: Conjunctivae normal.   Cardiovascular:      Heart sounds: Normal heart sounds, S1 normal and S2 normal.   Pulmonary:      Breath sounds: Decreased breath sounds present.   Abdominal:      General: Bowel sounds are normal.      Palpations: Abdomen is soft.   Musculoskeletal:      Cervical back: Normal range of motion and neck supple.   Skin:     Comments: Stage III sacral ulcer, bilateral posterior heel eschar -photos examined  Neurological:      Mental Status: She is alert.        Antibiotics  aspirin tablet 325 mg  acetaminophen (Tylenol) tablet 650 mg  cefepime (Maxipime) 1 g in dextrose 5 % 50 mL IV  sodium chloride 0.9 % bolus 2,229 mL  apixaban (Eliquis) tablet 2.5 mg  escitalopram (Lexapro) tablet 10 mg  febuxostat (Uloric) tablet 40 mg  fenofibrate (Triglide) tablet 160 mg  gabapentin (Neurontin) capsule 100 mg  midodrine (Proamatine) tablet 15 mg  nystatin (Mycostatin) 100,000 unit/gram powder  oxyCODONE-acetaminophen (Percocet) 5-325 mg per tablet 1 tablet  simvastatin (Zocor) tablet 20 mg  piperacillin-tazobactam-dextrose (Zosyn) IV 2.25 g  vancomycin (Vancocin) capsule 125 mg  oxygen (O2) therapy  dextrose 50 % injection 25 g  glucagon  (Glucagen) injection 1 mg  dextrose 10 % in water (D10W) infusion  insulin lispro (HumaLOG) injection 0-10 Units  nystatin (Mycostatin) 100,000 unit/gram powder 1 Application  vancomycin-diluent combo no.1 (Xellia) IVPB 1,750 mg  piperacillin-tazobactam-dextrose (Zosyn) IV 2.25 g  sodium chloride 0.9 % bolus 500 mL  norepinephrine (Levophed) 8 mg in dextrose 5% 250 mL (0.032 mg/mL) infusion (premix)  vancomycin (Vancocin) placeholder  pantoprazole (ProtoNix) EC tablet 40 mg  pantoprazole (ProtoNix) injection 40 mg  sennosides-docusate sodium (Judy-Colace) 8.6-50 mg per tablet 1 tablet  doxycycline (Vibramycin) in dextrose 5 % in water (D5W) 100 mL  mg  LORazepam (Ativan) injection 2 mg  magnesium sulfate IV 2 g  zinc oxide 20 % ointment 1 Application  nystatin (Mycostatin) cream  norepinephrine (Levophed) 8 mg in dextrose 5% 250 mL (0.032 mg/mL) infusion (premix)  heparin 1,000 unit/mL injection 2,000 Units  heparin 1,000 unit/mL injection 2,000 Units  nystatin (Mycostatin) ointment  acetaminophen (Tylenol) oral liquid 1,000 mg  albumin human 25 % solution 12.5 g  perflutren lipid microspheres (Definity) injection 0.5-10 mL of dilution  sulfur hexafluoride microsphr (Lumason) injection 24.28 mg  perflutren protein A microsphere (Optison) injection 0.5 mL  ipratropium-albuteroL (Duo-Neb) 0.5-2.5 mg/3 mL nebulizer solution 3 mL  sodium chloride 3 % nebulizer solution 3 mL  vancomycin-diluent combo no.1 (Xellia) IVPB 750 mg  sennosides-docusate sodium (Judy-Colace) 8.6-50 mg per tablet 1 tablet  acetaminophen (Tylenol) tablet 650 mg  doxycycline (Vibramycin) capsule 100 mg  magnesium oxide (Mag-Ox) tablet 400 mg  vasopressin (Vasostrict) 0.2 unit/mL infusion  norepinephrine (Levophed) 8 mg in dextrose 5% 250 mL (0.032 mg/mL) infusion (premix)  heparin 1,000 unit/mL injection 2,000 Units  heparin 1,000 unit/mL injection 2,000 Units  albumin human 25 % solution 12.5 g  vancomycin (Xellia) 1 g in 200 mL (Xellia)  IVPB 1 g  ipratropium-albuteroL (Duo-Neb) 0.5-2.5 mg/3 mL nebulizer solution 3 mL  sodium chloride 3 % nebulizer solution 3 mL  albumin human 5 % infusion 12.5 g  heparin 1,000 unit/mL injection 2,000 Units  heparin 1,000 unit/mL injection 2,000 Units  vancomycin (Vancocin) capsule 125 mg  albumin human 25 % solution 12.5 g  heparin 1,000 unit/mL injection 2,000 Units  heparin 1,000 unit/mL injection 2,000 Units  ipratropium-albuteroL (Duo-Neb) 0.5-2.5 mg/3 mL nebulizer solution 3 mL  lidocaine (Xylocaine) 10 mg/mL (1 %) injection 50 mg  sodium phosphate 15 mmol in sodium chloride 0.9% 250 mL IV  sod phos di, mono-K phos mono (K Phos Neutral) tablet 250 mg  potassium, sodium phosphates (Phos-NaK) 280-160-250 mg packet 1 packet  doxycycline (Vibramycin) capsule 100 mg  fludrocortisone (Florinef) tablet 0.1 mg      Relevant Results  Labs  Results from last 72 hours   Lab Units 02/04/24  0508 02/03/24  0407 02/02/24  0400   WBC AUTO x10*3/uL 14.1* 14.2* 13.6*   HEMOGLOBIN g/dL 9.7* 8.7* 9.3*   HEMATOCRIT % 30.2* 27.7* 28.9*   PLATELETS AUTO x10*3/uL 243 211 227       Results from last 72 hours   Lab Units 02/04/24  0508 02/03/24  0407 02/02/24  0400   SODIUM mmol/L 128* 135 131*   POTASSIUM mmol/L 4.3 4.0 4.2   CHLORIDE mmol/L 95* 100 98   CO2 mmol/L 18* 21* 20*   BUN mg/dL 16 11 15   CREATININE mg/dL 2.70* 2.20* 3.00*   GLUCOSE mg/dL 164* 186* 189*   CALCIUM mg/dL 8.7 8.4* 8.5   ANION GAP mmol/L 15 14 13   EGFR mL/min/1.73m*2 18* 23* 16*   PHOSPHORUS mg/dL  --  2.2* 2.9       Results from last 72 hours   Lab Units 02/04/24  0508 02/03/24  0407 02/02/24  0400   ALK PHOS U/L 223* 216* 247*   BILIRUBIN TOTAL mg/dL 1.1 1.2 1.0   PROTEIN TOTAL g/dL 5.7* 5.4* 5.1*   ALT U/L 11 11 14   AST U/L 22 24 34   ALBUMIN g/dL 2.7* 2.7* 2.3*       Estimated Creatinine Clearance: 21.5 mL/min (A) (by C-G formula based on SCr of 2.7 mg/dL (H)).  C-Reactive Protein   Date Value Ref Range Status   12/25/2023 5.20 (H) 0.00 - 2.00 mg/dL  Final     CRP   Date Value Ref Range Status   06/23/2023 19.9 (H) 0 - 2.0 MG/DL Final     Comment:     Performed at [address] [address]   05/22/2022 1.0 0 - 2.0 MG/DL Final     Comment:     Performed at [address] [address]     Microbiology  No results found for the last 14 days.  Reviewed   Imaging  Upper extremity venous duplex bilateral    Result Date: 1/25/2024           [place] [id number]            Phone [phone]  Vascular Lab Report  [facility] US UPPER EXTREMITY VENOUS DUPLEX BILATERAL Patient Name:      BAISA VEGA      Reading Physician:  [id number] Mini Busby MD, RPVI Study Date:        1/25/2024           Ordering Provider:  [id number] ERI BERMAN MRN/PID:           [id number]            Fellow: Accession#:        [id number]        Technologist:       Dara Parr RVT Date of Birth/Age: 1950 / 73 years Technologist 2: Gender:            F                   Encounter#:         [id number] Admission Status:  Inpatient           Location Performed: [facility]  Diagnosis/ICD: Right arm swelling-M79.89; Left arm swelling-M79.89 CPT Codes:     16521 Peripheral venous duplex scan for DVT complete  CONCLUSIONS:  Right Upper Venous: No evidence of acute deep vein thrombus visualized in the right upper extremity. Internal jugular vein was visualized in segments due to IV lines and bandages. Left Upper Venous: No evidence of acute deep vein thrombus visualized in the left upper extremity. Subclavian stent is noted and appears patent. There is a known occluded dialysis access noted.  Additional Findings: Technically difficult exam due to IV lines, bandages, and patient's positioning.  Imaging & Doppler Findings:  Right               Compressible Thrombus        Flow Internal Jugular        Yes        None   Spontaneous/Phasic Subclavian               Yes        None Subclavian Proximal     Yes        None   Spontaneous/Phasic Subclavian Mid          Yes        None Subclavian Distal       Yes        None   Spontaneous/Phasic Axillary                Yes        None       Pulsatile Brachial                Yes        None Cephalic                Yes        None Basilic                 Yes        None  Left                Compress Thrombus        Flow Internal Jugular      Yes      None       Pulsatile Subclavian            Yes      None Subclavian Proximal   Yes      None       Pulsatile Subclavian Mid        Yes      None       Pulsatile Subclavian Distal     Yes      None       Pulsatile Axillary              Yes      None   Spontaneous/Phasic Brachial              Yes      None Cephalic              Yes      None Basilic               Yes      None  [id number] Mini Busby MD, ALICE Electronically signed by [id number] Mini Busby MD, RPVI on 1/25/2024 at 4:06:13 PM  ** Final **     ECG 12 lead    Result Date: 1/24/2024  Sinus tachycardia  with 1st degree AV block Right bundle branch block Possible Lateral infarct , age undetermined Abnormal ECG Confirmed by Yesika Nj ([id number]) on 1/24/2024 6:54:45 AM    XR tibia fibula right 2 views    Result Date: 1/22/2024  Interpreted By:  Real Mendieta, STUDY: XR TIBIA FIBULA RIGHT 2 VIEWS; 1/22/2024 2:15 pm   INDICATION: Signs/Symptoms:evaluate source of infection, osteomyelitis;   COMPARISON: None available.   ACCESSION NUMBER(S): [id number]   ORDERING CLINICIAN: BAN GIFFORD   TECHNIQUE: Views: AP and Lateral of the right tibia and fibula   FINDINGS: RESULT: There is no evidence for fracture or dislocation. Tricompartmental degenerative changes of the right knee. The tibia and fibula appear intact without bony erosion or evidence for osteomyelitis. No other bony or soft tissue abnormality is identified. No subcutaneous gas is noted.       No evidence for acute osseous abnormality. No bony erosion to suggest  osteomyelitis.   Signed by: Real Mendieta 1/22/2024 3:15 PM Dictation workstation:   [id number]    XR ankle right 3+ views    Result Date: 1/22/2024  Interpreted By:  Real Mendieta, STUDY: XR ANKLE RIGHT 3+ VIEWS; 1/22/2024 2:15 pm   INDICATION: Signs/Symptoms:Evuluate source of infection, osteomyelitis;   COMPARISON: None available.   ACCESSION NUMBER(S): [id number]   ORDERING CLINICIAN: BAN GIFFORD   TECHNIQUE: Views: AP, Lateral, Oblique, right ankle   FINDINGS: RESULT: There is no evidence for fracture or dislocation. The ankle mortise is intact. Joint spaces appear adequately maintained. No bony erosion to suggest osteomyelitis. No bone lesion or soft tissue abnormality is identified. No subcutaneous gas is seen.       No evidence for acute osseous abnormality. No bony erosion to suggest osteomyelitis.   Signed by: Real Mendieta 1/22/2024 3:14 PM Dictation workstation:   [id number]    Transthoracic Echo (TTE) Complete    Result Date: 1/22/2024           [place] [address]            Phone [phone] TRANSTHORACIC ECHOCARDIOGRAM REPORT  Patient Name:      BASIA GUAN GARY Gray Physician:   [id number] [facility]  Study Date:        1/22/2024           Ordering Provider:   [id number] BERMAN MRN/PID:           [id number]            Fellow: Accession#:        [id number]        Nurse: Date of Birth/Age: 1950 / 73 years Sonographer:         Tabatha Page RDCS Gender:            F                   Additional Staff: Height:            157.00 cm           Admit Date: Weight:            75.00 kg            Admission Status:    Inpatient - Routine BSA:               1.76 m2             Department Location: [facility] Blood Pressure: 88 /49 mmHg Study Type:    TRANSTHORACIC ECHO (TTE) COMPLETE Diagnosis/ICD: Chronic combined systolic (congestive) and diastolic (congestive)                 heart failure (CHF)-I50.42; Sepsis, unspecified organism-A41.9 Indication:    heart failure,septic shock CPT Codes:     Echo Complete w Full [id number] Patient History: BMI:               Obese >30 Pertinent History: Sepsis, PVD, A-Fib, CVA, Cancer and HTN. breast                    prosthesis/ca, ESRD,anemia,septic shock,heart failure. Study Detail: The following Echo studies were performed: 2D, M-Mode, Doppler and               color flow. Technically challenging study due to prosthesis,               prominent lung artifact and body habitus.  PHYSICIAN INTERPRETATION: Left Ventricle: Left ventricular systolic function is normal, with an estimated ejection fraction of 70%. There are no regional wall motion abnormalities. The left ventricular cavity size is normal. Left ventricular diastolic filling was indeterminate. Left Atrium: The left atrium is moderately dilated. Right Ventricle: The right ventricle is normal in size. There is normal right ventricular global systolic function. Right Atrium: The right atrium is normal in size. Aortic Valve: The aortic valve is trileaflet. There is no evidence of aortic valve regurgitation. The peak instantaneous gradient of the aortic valve is 2.8 mmHg. Mitral Valve: The mitral valve is normal in structure. There is no evidence of mitral valve regurgitation. Tricuspid Valve: The tricuspid valve is structurally normal. There is trace tricuspid regurgitation. Pulmonic Valve: The pulmonic valve is not well visualized. The pulmonic valve regurgitation was not well visualized. Pericardium: There is no pericardial effusion noted. Aorta: The aortic root is normal.  CONCLUSIONS:  1. Left ventricular systolic function is normal with a 70% estimated ejection fraction.  2. The left atrium is moderately dilated.  3. Left ventricular diastolic filling indeterminate. QUANTITATIVE DATA SUMMARY: 2D MEASUREMENTS:                          Normal Ranges: LAs:           4.50 cm   (2.7-4.0cm)  IVSd:          0.61 cm   (0.6-1.1cm) LVPWd:         0.88 cm   (0.6-1.1cm) LVIDd:         3.66 cm   (3.9-5.9cm) LV Mass Index: 41.9 g/m2 LV SYSTOLIC FUNCTION BY 2D PLANIMETRY (MOD):                     Normal Ranges: EF-A4C View: 68.3 % (>=55%) LV DIASTOLIC FUNCTION:                     Normal Ranges: MV Peak E: 1.19 m/s (0.7-1.2 m/s) MV Peak A: 0.90 m/s (0.42-0.7 m/s) E/A Ratio: 1.32     (1.0-2.2) MITRAL VALVE:                 Normal Ranges: MV DT: 192 msec (150-240msec) AORTIC VALVE:                         Normal Ranges: AoV Vmax:      0.84 m/s (<=1.7m/s) AoV Peak P.8 mmHg (<20mmHg) LVOT Max Shimon:  0.47 m/s (<=1.1m/s) LVOT Diameter: 1.90 cm  (1.8-2.4cm) AoV Area,Vmax: 1.57 cm2 (2.5-4.5cm2) TRICUSPID VALVE/RVSP:                   Normal Ranges: IVC Diam: 1.05 cm  [id number] [facility]  Electronically signed on 2024 at 2:53:04 PM  ** Final **     XR foot left 3+ views    Result Date: 2024  Interpreted By:  Real Mendieta, STUDY: XR FOOT LEFT 1-2 VIEWS; 2024 4:15 pm   INDICATION: Signs/Symptoms:osteomyelitis. Pain   COMPARISON: 2023   ACCESSION NUMBER(S): [id number]   ORDERING CLINICIAN: BAN GIFFORD   TECHNIQUE: Views:  AP, Lat, Oblique, left foot   FINDINGS: RESULT: There is no evidence for fracture or dislocation. 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 Mild hammertoe deformities. Joint spaces appear adequately maintained. Soft tissue ulceration of the posterior aspect of the heel however no evidence for bony erosion to suggest osteomyelitis.       No evidence for acute fracture or acute bony erosion to suggest osteomyelitis. Chronic changes as described.   Signed by: Real Mendieta 2024 7:15 PM Dictation workstation:   [id number]    CT chest abdomen pelvis wo IV contrast    Result Date: 2024  Interpreted By:  Maria Garcia, STUDY: CT CHEST ABDOMEN PELVIS WO CONTRAST;  2024 11:42 pm   INDICATION: Mental status change. Elevated white blood  cell count with infectious workup.   COMPARISON: 08/20/2023   ACCESSION NUMBER(S): [id number]   ORDERING CLINICIAN: BAN GIFFORD   TECHNIQUE: CT of the chest, abdomen and pelvis was performed with no oral or intravenous contrast administered. Sagittal and coronal reformations were completed by the technologist at the acquisition scanner.   All CT examinations are performed with 1 or more of the following dose reduction techniques: Automated exposure control, adjustment of mA and/or kv according to patient's size, or use of iterative reconstruction techniques.   FINDINGS: Please note that the study is limited without intravenous contrast.   CHEST: Bilateral pleural effusions are present with right effusion moderately small in size and with the left effusion moderately small in size as well. There is shift of the heart and mediastinum to the left of midline due to atelectasis of the left upper lobe. There is consolidation throughout left lower lobe with air bronchograms noted. On the right, there is compressive atelectasis seen posteriorly in the right lower lobe.   There is mild reactive mediastinal adenopathy seen at this time with mediastinal lymph nodes increased in size since prior study. Several of these mediastinal nodes are at the upper limits of normal measuring 8 mm in short axis diameter.   No pericardial effusion is present. The cardiac size is normal with mitral annulus calcification.   There has been prior bilateral mastectomy with reconstruction of the left breast with implant placement. Surgical clips are visible within the left axilla. Stent grafts are visible within the left upper extremity and within the left innominate, subclavian as well as axillary veins.   A peripherally calcified 1.8 cm nodule arises from the lower pole of left thyroid lobe.   ABDOMEN:   LIVER: Unremarkable.   BILE DUCTS: No intrahepatic or extrahepatic biliary ductal dilatation is seen.   GALLBLADDER: Cholelithiasis is  observed with some calcification of the gallbladder wall demonstrated as well. No gallbladder wall thickening or pericholecystic fluid is evident.   PANCREAS: Unremarkable   SPLEEN: Unremarkable   ADRENAL GLANDS: Unremarkable   KIDNEYS AND URETERS: Kidneys are small in size with extensive renal artery calcification demonstrated.   PELVIS:   BLADDER: Amos catheter is present within the decompressed urinary bladder.   REPRODUCTIVE ORGANS: Calcification of the arcuate arteries within the uterus is seen. There is no uterine enlargement or adnexal mass.   BOWEL: A rectal balloon is seen. There is no abnormal distention of the intestinal tract.   VESSELS: There is atherosclerosis of the thoracoabdominal aorta and iliac arteries with calcification in the walls of the celiac, splenic, hepatic, and mesenteric arteries.   PERITONEUM/RETROPERITONEUM/LYMPH NODES: There is a minimal amount of ascites seen about the liver and spleen with mild presacral edema noted.   ABDOMINAL WALL: There is anasarca involving the soft tissues of the abdomen and pelvis. Postoperative change from ventral hernia repair is seen.   BONES: Bilateral sacroiliitis is seen. There is lumbar dextroscoliosis. Chronic rotator cuff tear is present bilaterally with osteoarthritis of both shoulders, right greater than left.       Chest 1.  Moderately small bilateral pleural effusions with left upper lobe atelectasis and compressive atelectasis seen posteriorly in right lower lobe. A left lower lobe pneumonia is identified. There is extensive airspace consolidation within left lower lobe with air bronchograms observed. Mild mediastinal reactive adenopathy is present as well.   Abdomen-Pelvis 1.  Cholelithiasis without evidence of cholecystitis. 2. Small amount of ascites with mild presacral edema and anasarca within the soft tissues of the abdominal wall. 3. Renal atrophy with extensive vascular calcifications.     MACRO: None   Signed by: Maria Jose 1/21/2024  8:30 AM Dictation workstation:   [id number]    CT head wo IV contrast    Result Date: 1/21/2024  Interpreted By:  Maria Garcia, STUDY: CT HEAD WO IV CONTRAST 1/20/2024 11:42 pm   INDICATION: Signs/Symptoms:mental status changes   COMPARISON: 12/11/2023   ACCESSION NUMBER(S): [id number]   ORDERING CLINICIAN: BAN GIFFORD   TECHNIQUE: Unenhanced axial images of the brain are completed.   All CT examinations are performed with 1 or more of the following dose reduction techniques: Automated exposure control, adjustment of mA and/or kv according to patient's size, or use of iterative reconstruction techniques.   FINDINGS: Helical unenhanced axial images of the brain demonstrate a mild to moderate degree of ventricular enlargement with proportionate widening of the sulci and sylvian fissures. There is no midline shift, mass effect, extra-axial fluid collection, or acute intracranial hemorrhage. There is diminished density seen in the periventricular white matter indicating chronic microvascular ischemic disease. There is calcified plaque seen within the distal vertebral and internal carotid arteries bilaterally. No calvarial abnormality is seen.       Atrophy and chronic microvascular ischemic disease without acute intracranial process.   Signed by: Maria Garcia 1/21/2024 8:09 AM Dictation workstation:   [id number]    XR chest 1 view    Result Date: 1/20/2024  Interpreted By:  Brendon Perez, STUDY: XR CHEST 1 VIEW; 1/20/2024 5:03 pm   INDICATION: CLINICAL INFORMATION: Signs/Symptoms:Insertion right IJ central line, also left thoracentesis.   COMPARISON: 01/19/2024 at 1338 hours   ACCESSION NUMBER(S): [id number]   ORDERING CLINICIAN: BOZENA ANTONIO   TECHNIQUE: Portable chest one view.   FINDINGS: The cardiac size is indeterminate in view of the AP projection. There is no change in the right-sided dual port central venous catheter. There is a new right internal jugular venous catheter present with the tip at  approximately same level as the dual port central venous catheter, overlying the mid to lower right atrium. There is no evidence for pneumothorax. Patient has a history of left thoracentesis without evidence for pneumothorax on the left. There is bilateral basilar alveolar infiltrate and effusions. Left effusion is decreased compared to the study from 1 day earlier.   Surgical clips are identified within left chest wall. Endovascular stent is identified in the distribution of the left subclavian artery.       1. Status post right-sided central venous catheter placement as described above with the tip overlying the mid to caudal aspect of the right atrium. There is no evidence for pneumothorax. 2. No evidence for left-sided pneumothorax after left-sided thoracentesis. Decrease in the left effusion. 3. Bilateral basilar infiltrates and effusions are present. Follow-up to assure complete clearing is suggested.   MACRO: none   Signed by: Brendon Perez 1/20/2024 5:11 PM Dictation workstation:   [id number]    XR chest 1 view    Result Date: 1/19/2024  Interpreted By:  Real Mendieta, STUDY: XR CHEST 1 VIEW; 1/19/2024 1:38 pm   INDICATION: Signs/Symptoms:dyspnea.   COMPARISON: 12/26/2023   ACCESSION NUMBER(S): [id number]   ORDERING CLINICIAN: EMELY GOLDSMITH   TECHNIQUE: 1 view of the chest was performed.   FINDINGS: There may be a minimal right pleural effusion. Slight prominence of the interstitium otherwise the right lung is clear. Improved appearance of the left lung with improved aeration of the right upper lobe. There is opacification of the left lower lobe possibly due to large pleural effusion which is similar to the prior study. No pneumothorax. Right-sided dual lumen central line catheter with tip at the proximal atrium. The cardiomediastinal silhouette is within normal limits. Left-sided subclavian stents are again noted.       Improved aeration and appearance of the left lung superiorly however there is a  persistent large left pleural effusion and opacification of the left lower lobe.   Signed by: Rael Mendieta 1/19/2024 1:44 PM Dictation workstation:   [id number]    Electrocardiogram, 12-lead PRN ACS symptoms    Result Date: 1/7/2024  Sinus rhythm Right bundle branch block Septal infarct (cited on or before 06-DEC-2023) Abnormal ECG When compared with ECG of 06-DEC-2023 13:06, Questionable change in initial forces of Septal leads Confirmed by Herminio Lemus ([id number]) on 1/7/2024 11:04:01 AM     Assessment/Plan     Septic shock-on pressors-resolving  Left-sided pleural effusion   Left lower lobe pneumonia-treated  Proteus urinary tract infection-treated  History of MRSA endocarditis  History of C. 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

## 2024-02-05 NOTE — CARE PLAN
Problem: Respiratory  Goal: Clear secretions with interventions this shift  Outcome: Progressing     Problem: Respiratory  Goal: Minimal/no exertional discomfort or dyspnea this shift  Outcome: Progressing     Problem: Respiratory  Goal: Verbalize decreased shortness of breath this shift  Outcome: Progressing

## 2024-02-05 NOTE — PROGRESS NOTES
Patient not medically clear. Patient was denied by insurance to go to [facility]. The physician completed the peer to peer and the insurance upheld the denial. [facility] will be completing a written appeal. At this time there is not a safe discharge plan. Will follow.     **PATIENT DOES NOT HAVE A SAFE DISCHARGE PLAN      Shauna Jensen RN

## 2024-02-05 NOTE — CARE PLAN
The patient's goals for the shift include maintain comfort    The clinical goals for the shift include Maintain hemodynamic stability    Problem: Pain  Goal: My pain/discomfort is manageable  Outcome: Progressing     Problem: Safety  Goal: Patient will be injury free during hospitalization  Outcome: Progressing  Goal: I will remain free of falls  Outcome: Progressing     Problem: Daily Care  Goal: Daily care needs are met  Outcome: Progressing     Problem: Psychosocial Needs  Goal: Demonstrates ability to cope with hospitalization/illness  Outcome: Progressing  Goal: Collaborate with me, my family, and caregiver to identify my specific goals  Outcome: Progressing     Problem: Discharge Barriers  Goal: My discharge needs are met  Outcome: Progressing     Problem: Fall/Injury  Goal: Not fall by end of shift  Outcome: Progressing  Goal: Be free from injury by end of the shift  Outcome: Progressing  Goal: Verbalize understanding of personal risk factors for fall in the hospital  Outcome: Progressing  Goal: Verbalize understanding of risk factor reduction measures to prevent injury from fall in the home  Outcome: Progressing  Goal: Pace activities to prevent fatigue by end of the shift  Outcome: Progressing     Problem: Skin  Goal: Decreased wound size/increased tissue granulation at next dressing change  Outcome: Progressing  Flowsheets (Taken 2/4/2024 2145)  Decreased wound size/increased tissue granulation at next dressing change:   Protective dressings over bony prominences   Utilize specialty bed per algorithm   Promote sleep for wound healing  Goal: Participates in plan/prevention/treatment measures  Outcome: Progressing  Flowsheets (Taken 2/4/2024 2145)  Participates in plan/prevention/treatment measures: Elevate heels  Goal: Prevent/manage excess moisture  Outcome: Progressing  Flowsheets (Taken 2/4/2024 2145)  Prevent/manage excess moisture:   Moisturize dry skin   Cleanse incontinence/protect with barrier  cream   Monitor for/manage infection if present   Follow provider orders for dressing changes  Goal: Prevent/minimize sheer/friction injuries  Outcome: Progressing  Flowsheets (Taken 2/4/2024 2145)  Prevent/minimize sheer/friction injuries:   Complete micro-shifts as needed if patient unable. Adjust patient position to relieve pressure points, not a full turn   Increase activity/out of bed for meals   Use pull sheet   HOB 30 degrees or less   Turn/reposition every 2 hours/use positioning/transfer devices   Utilize specialty bed per algorithm  Goal: Promote/optimize nutrition  Outcome: Progressing  Flowsheets (Taken 2/4/2024 2145)  Promote/optimize nutrition:   Monitor/record intake including meals   Consume > 50% meals/supplements  Goal: Promote skin healing  Outcome: Progressing  Flowsheets (Taken 2/4/2024 2145)  Promote skin healing:   Protective dressings over bony prominences   Turn/reposition every 2 hours/use positioning/transfer devices   Ensure correct size (line/device) and apply per  instructions   Rotate device position/do not position patient on device   Assess skin/pad under line(s)/device(s)     Problem: Pain  Goal: Takes deep breaths with improved pain control throughout the shift  Outcome: Progressing  Goal: Turns in bed with improved pain control throughout the shift  Outcome: Progressing  Goal: Walks with improved pain control throughout the shift  Outcome: Progressing  Goal: Performs ADL's with improved pain control throughout shift  Outcome: Progressing  Goal: Participates in PT with improved pain control throughout the shift  Outcome: Progressing  Goal: Free from opioid side effects throughout the shift  Outcome: Progressing  Goal: Free from acute confusion related to pain meds throughout the shift  Outcome: Progressing     Problem: Respiratory  Goal: Clear secretions with interventions this shift  Outcome: Progressing  Goal: Minimize anxiety/maximize coping throughout  shift  Outcome: Progressing  Goal: Minimal/no exertional discomfort or dyspnea this shift  Outcome: Progressing  Goal: Patent airway maintained this shift  Outcome: Progressing  Goal: Tolerate pulmonary toileting this shift  Outcome: Progressing  Goal: Verbalize decreased shortness of breath this shift  Outcome: Progressing  Goal: Wean oxygen to maintain O2 saturation per order/standard this shift  Outcome: Progressing  Goal: Increase self care and/or family involvement in next 24 hours  Outcome: Progressing     Problem: Diabetes  Goal: Maintain electrolyte levels within acceptable range throughout shift  Outcome: Progressing  Goal: Maintain glucose levels >70mg/dl to <250mg/dl throughout shift  Outcome: Progressing  Goal: No changes in neurological exam by end of shift  Outcome: Progressing  Goal: Learn about and adhere to nutrition recommendations by end of shift  Outcome: Progressing  Goal: Vital signs within normal range for age by end of shift  Outcome: Progressing  Goal: Increase self care and/or family involovement by end of shift  Outcome: Progressing  Goal: Receive DSME education by end of shift  Outcome: Progressing     Problem: Discharge Planning  Goal: Discharge to home or other facility with appropriate resources  Outcome: Progressing     Problem: Chronic Conditions and Co-morbidities  Goal: Patient's chronic conditions and co-morbidity symptoms are monitored and maintained or improved  Outcome: Progressing

## 2024-02-05 NOTE — PROGRESS NOTES
CONSULT PROGRESS NOTES    SERVICE DATE: 2/5/2024   SERVICE TIME: 10:40 AM    CONSULTING SERVICE: Nephrology    ASSESSMENT AND PLAN   1.  End-stage renal disease  2.  Hypotension  3.  Hyponatremia  4.  Hypokalemia  5.  Anemia of chronic kidney disease     Dialysis today with [facility] again with low temperature and attempting gentle fluid removal.    Hyponatremia from volume overload in this patient with anuria.  Hypokalemia improved, use 3K bath tomorrow.  Hemoglobin is close to goal.  She remains dependent on norepinephrine infusion otherwise her blood pressure drops dangerously low.  I personally reviewed the echocardiogram with Dr. Shane, which may demonstrate end-stage heart failure.  I discussed this with the patient and her daughter.  She had been set on LT[facility] for placement.  She had a meeting with hospice recently, but it does not sound like she will approach this disposition.    She does not need, nor do I believe she would derive any benefit from CRRT.  Magnesium balance has improved.  I have no specific objection to keeping the arterial line out.  I also have no specific objection to transition from a midline to a PICC.  Sounds like in efforts to keep her blood pressure high, she may need to have her norepinephrine infusion through her dialysis catheter while the midline is switched to a PICC.  While this is not ideal, it is probably necessary.  None of her options for disposition are great.  She has a poor overall prognosis, but it has been difficult to get the patient and her daughter/family to understand this.  Discussed with Dr. Shane.    SUBJECTIVE  INTERVAL HPI: Seen at bedside with her daughter.  She is somewhat flat today.  She has no specific nausea, no vomiting, no chest pain, no dyspnea.  Very weak, tired, fatigued, poorly mobile.  Awaits hemodialysis today.    MEDICATIONS:  apixaban, 2.5 mg, oral, BID  doxycylcine, 100 mg, oral, Daily  escitalopram, 10 mg, oral, Nightly  gabapentin, 100 mg, oral,  TID  heparin, 2,000 Units, intra-catheter, After Dialysis  heparin, 2,000 Units, intra-catheter, After Dialysis  heparin, 2,000 Units, intra-catheter, After Dialysis  heparin, 2,000 Units, intra-catheter, After Dialysis  insulin lispro, 0-10 Units, subcutaneous, TID with meals  ipratropium-albuteroL, 3 mL, nebulization, q6h while awake  lidocaine, 5 mL, infiltration, Once  midodrine, 15 mg, oral, TID with meals  nystatin, , Topical, BID  nystatin, , Topical, BID  pantoprazole, 40 mg, oral, Daily   Or  pantoprazole, 40 mg, intravenous, Daily  psyllium, 1 packet, oral, BID  sodium chloride, 3 mL, nebulization, TID  vancomycin, 125 mg, oral, BID  zinc oxide, 1 Application, Topical, BID       norepinephrine, 0.01-3 mcg/kg/min, Last Rate: 0.08 mcg/kg/min (02/05/24 1000)       PRN medications: acetaminophen, alteplase, benzocaine-menthoL, dextrose 10 % in water (D10W), dextrose, glucagon, ondansetron, oxygen, sennosides-docusate sodium     OBJECTIVE  PHYSICAL EXAM:   Heart Rate:  []   Temp:  [36.2 °C (97.2 °F)-36.8 °C (98.2 °F)]   Resp:  [12-22]   BP: ()/(27-71)   Weight:  [102 kg (225 lb 5 oz)]   SpO2:  [90 %-100 %]   Body mass index is 41.21 kg/m².  Chronically ill-appearing elderly white woman  Pale skin  Right-sided hand swelling  Left-sided finger amputation  Internal jugular tunneled hemodialysis catheter in place  There is no significant pretibial edema on both lower extremities  Soft abdomen  No Amos  No obvious joint deformities  Moist mucosa  Hearing seems to be intact  Phonation intact  Rectal tube in place    DATA:   Labs:  Results for orders placed or performed during the hospital encounter of 01/19/24 (from the past 96 hour(s))   POCT GLUCOSE   Result Value Ref Range    POCT Glucose 209 (H) 74 - 99 mg/dL   POCT GLUCOSE   Result Value Ref Range    POCT Glucose 132 (H) 74 - 99 mg/dL   Phosphorus   Result Value Ref Range    Phosphorus 2.9 2.5 - 4.5 mg/dL   CBC   Result Value Ref Range    WBC 13.6  (H) 4.4 - 11.3 x10*3/uL    nRBC 0.0 0.0 - 0.0 /100 WBCs    RBC 3.10 (L) 4.00 - 5.20 x10*6/uL    Hemoglobin 9.3 (L) 12.0 - 16.0 g/dL    Hematocrit 28.9 (L) 36.0 - 46.0 %    MCV 93 80 - 100 fL    MCH 30.0 26.0 - 34.0 pg    MCHC 32.2 32.0 - 36.0 g/dL    RDW 24.3 (H) 11.5 - 14.5 %    Platelets 227 150 - 450 x10*3/uL   Comprehensive metabolic panel   Result Value Ref Range    Glucose 189 (H) 65 - 99 mg/dL    Sodium 131 (L) 133 - 145 mmol/L    Potassium 4.2 3.4 - 5.1 mmol/L    Chloride 98 97 - 107 mmol/L    Bicarbonate 20 (L) 24 - 31 mmol/L    Urea Nitrogen 15 8 - 25 mg/dL    Creatinine 3.00 (H) 0.40 - 1.60 mg/dL    eGFR 16 (L) >60 mL/min/1.73m*2    Calcium 8.5 8.5 - 10.4 mg/dL    Albumin 2.3 (L) 3.5 - 5.0 g/dL    Alkaline Phosphatase 247 (H) 35 - 125 U/L    Total Protein 5.1 (L) 5.9 - 7.9 g/dL    AST 34 5 - 40 U/L    Bilirubin, Total 1.0 0.1 - 1.2 mg/dL    ALT 14 5 - 40 U/L    Anion Gap 13 <=19 mmol/L   Magnesium   Result Value Ref Range    Magnesium 1.50 (L) 1.60 - 3.10 mg/dL   POCT GLUCOSE   Result Value Ref Range    POCT Glucose 177 (H) 74 - 99 mg/dL   POCT GLUCOSE   Result Value Ref Range    POCT Glucose 151 (H) 74 - 99 mg/dL   POCT GLUCOSE   Result Value Ref Range    POCT Glucose 158 (H) 74 - 99 mg/dL   POCT GLUCOSE   Result Value Ref Range    POCT Glucose 149 (H) 74 - 99 mg/dL   Phosphorus   Result Value Ref Range    Phosphorus 2.2 (L) 2.5 - 4.5 mg/dL   CBC   Result Value Ref Range    WBC 14.2 (H) 4.4 - 11.3 x10*3/uL    nRBC 0.0 0.0 - 0.0 /100 WBCs    RBC 2.93 (L) 4.00 - 5.20 x10*6/uL    Hemoglobin 8.7 (L) 12.0 - 16.0 g/dL    Hematocrit 27.7 (L) 36.0 - 46.0 %    MCV 95 80 - 100 fL    MCH 29.7 26.0 - 34.0 pg    MCHC 31.4 (L) 32.0 - 36.0 g/dL    RDW 24.1 (H) 11.5 - 14.5 %    Platelets 211 150 - 450 x10*3/uL   Comprehensive metabolic panel   Result Value Ref Range    Glucose 186 (H) 65 - 99 mg/dL    Sodium 135 133 - 145 mmol/L    Potassium 4.0 3.4 - 5.1 mmol/L    Chloride 100 97 - 107 mmol/L    Bicarbonate 21 (L) 24 -  31 mmol/L    Urea Nitrogen 11 8 - 25 mg/dL    Creatinine 2.20 (H) 0.40 - 1.60 mg/dL    eGFR 23 (L) >60 mL/min/1.73m*2    Calcium 8.4 (L) 8.5 - 10.4 mg/dL    Albumin 2.7 (L) 3.5 - 5.0 g/dL    Alkaline Phosphatase 216 (H) 35 - 125 U/L    Total Protein 5.4 (L) 5.9 - 7.9 g/dL    AST 24 5 - 40 U/L    Bilirubin, Total 1.2 0.1 - 1.2 mg/dL    ALT 11 5 - 40 U/L    Anion Gap 14 <=19 mmol/L   Magnesium   Result Value Ref Range    Magnesium 1.80 1.60 - 3.10 mg/dL   POCT GLUCOSE   Result Value Ref Range    POCT Glucose 151 (H) 74 - 99 mg/dL   Staphylococcus Aureus/MRSA Colonization, Culture - PICC Only    Specimen: Anterior Nares; Swab   Result Value Ref Range    Staph/MRSA Screen Culture No Staphylococcus aureus isolated    POCT GLUCOSE   Result Value Ref Range    POCT Glucose 190 (H) 74 - 99 mg/dL   POCT GLUCOSE   Result Value Ref Range    POCT Glucose 152 (H) 74 - 99 mg/dL   CBC   Result Value Ref Range    WBC 14.1 (H) 4.4 - 11.3 x10*3/uL    nRBC 0.0 0.0 - 0.0 /100 WBCs    RBC 3.18 (L) 4.00 - 5.20 x10*6/uL    Hemoglobin 9.7 (L) 12.0 - 16.0 g/dL    Hematocrit 30.2 (L) 36.0 - 46.0 %    MCV 95 80 - 100 fL    MCH 30.5 26.0 - 34.0 pg    MCHC 32.1 32.0 - 36.0 g/dL    RDW 24.0 (H) 11.5 - 14.5 %    Platelets 243 150 - 450 x10*3/uL   Comprehensive metabolic panel   Result Value Ref Range    Glucose 164 (H) 65 - 99 mg/dL    Sodium 128 (L) 133 - 145 mmol/L    Potassium 4.3 3.4 - 5.1 mmol/L    Chloride 95 (L) 97 - 107 mmol/L    Bicarbonate 18 (L) 24 - 31 mmol/L    Urea Nitrogen 16 8 - 25 mg/dL    Creatinine 2.70 (H) 0.40 - 1.60 mg/dL    eGFR 18 (L) >60 mL/min/1.73m*2    Calcium 8.7 8.5 - 10.4 mg/dL    Albumin 2.7 (L) 3.5 - 5.0 g/dL    Alkaline Phosphatase 223 (H) 35 - 125 U/L    Total Protein 5.7 (L) 5.9 - 7.9 g/dL    AST 22 5 - 40 U/L    Bilirubin, Total 1.1 0.1 - 1.2 mg/dL    ALT 11 5 - 40 U/L    Anion Gap 15 <=19 mmol/L   Magnesium   Result Value Ref Range    Magnesium 1.90 1.60 - 3.10 mg/dL   POCT GLUCOSE   Result Value Ref Range     POCT Glucose 151 (H) 74 - 99 mg/dL   POCT GLUCOSE   Result Value Ref Range    POCT Glucose 166 (H) 74 - 99 mg/dL   POCT GLUCOSE   Result Value Ref Range    POCT Glucose 168 (H) 74 - 99 mg/dL   CBC   Result Value Ref Range    WBC 11.7 (H) 4.4 - 11.3 x10*3/uL    nRBC 0.0 0.0 - 0.0 /100 WBCs    RBC 3.13 (L) 4.00 - 5.20 x10*6/uL    Hemoglobin 9.5 (L) 12.0 - 16.0 g/dL    Hematocrit 31.4 (L) 36.0 - 46.0 %     80 - 100 fL    MCH 30.4 26.0 - 34.0 pg    MCHC 30.3 (L) 32.0 - 36.0 g/dL    RDW 24.1 (H) 11.5 - 14.5 %    Platelets 259 150 - 450 x10*3/uL   Comprehensive metabolic panel   Result Value Ref Range    Glucose 192 (H) 65 - 99 mg/dL    Sodium 128 (L) 133 - 145 mmol/L    Potassium 4.9 3.4 - 5.1 mmol/L    Chloride 97 97 - 107 mmol/L    Bicarbonate 16 (L) 24 - 31 mmol/L    Urea Nitrogen 21 8 - 25 mg/dL    Creatinine 3.40 (H) 0.40 - 1.60 mg/dL    eGFR 14 (L) >60 mL/min/1.73m*2    Calcium 8.5 8.5 - 10.4 mg/dL    Albumin 2.4 (L) 3.5 - 5.0 g/dL    Alkaline Phosphatase 234 (H) 35 - 125 U/L    Total Protein 5.6 (L) 5.9 - 7.9 g/dL    AST 29 5 - 40 U/L    Bilirubin, Total 0.8 0.1 - 1.2 mg/dL    ALT 12 5 - 40 U/L    Anion Gap 15 <=19 mmol/L   Magnesium   Result Value Ref Range    Magnesium 2.10 1.60 - 3.10 mg/dL   POCT GLUCOSE   Result Value Ref Range    POCT Glucose 178 (H) 74 - 99 mg/dL         SIGNATURE: Saeed Mondragon MD PATIENT NAME: Ayanna Gross   DATE: February 5, 2024 MRN: [id number]   TIME: 10:40 AM PAGER: [phone]

## 2024-02-05 NOTE — PROGRESS NOTES
"Nutrition Follow up Note    Nutrition Assessment      Patient remains in isolation. Exchange of midline to PICC line scheduled for today. Hospice consult, patient to continue with treatment model of care. Precert started for discharge to [facility].    Nutrition History:     Energy Intake: Poor < 50 %  Food Allergies/Intolerances:  None  GI Symptoms: None  Oral Problems: None    Anthropometrics:  Ht: 157.5 cm (5' 2\"), Wt: 94.7 kg (208 lb 12.4 oz), BMI: 38.18  IBW/kg (Dietitian Calculated): 50 kg  Percent of IBW: 151.6 %  Adjusted Body Weight (kg): 56.36 kg    Weight Change:  Daily Weight  02/05/24 : 94.7 kg (208 lb 12.4 oz)  01/05/24 : 72.8 kg (160 lb 7.9 oz)  11/19/23 : 72.6 kg (160 lb)  01/19/23 : 80.2 kg (176 lb 12.9 oz)  11/07/22 : 86.2 kg (190 lb)  07/21/22 : 80.2 kg (176 lb 12.9 oz)  05/26/22 : 77.1 kg (170 lb)  04/04/22 : 86.2 kg (190 lb)  06/29/21 : 87.9 kg (193 lb 12.6 oz)  05/11/21 : 81.6 kg (180 lb)     Nutrition Focused Physical Exam Findings:      Edema  Edema: +2 mild  Edema Location: BLE, BUE    Nutrition Significant Labs:  Lab Results   Component Value Date    WBC 11.7 (H) 02/05/2024    HGB 9.5 (L) 02/05/2024    HCT 31.4 (L) 02/05/2024     02/05/2024    CHOL 104 (L) 07/01/2023    TRIG 155 (H) 07/01/2023    HDL 30 (L) 07/01/2023    ALT 12 02/05/2024    AST 29 02/05/2024     (L) 02/05/2024    K 4.9 02/05/2024    CL 97 02/05/2024    CREATININE 3.40 (H) 02/05/2024    BUN 21 02/05/2024    CO2 16 (L) 02/05/2024    TSH 6.10 (H) 01/24/2024    INR 1.3 (H) 01/19/2024    HGBA1C 8.8 (H) 06/25/2023    ALBUR 1,315 (H) 02/24/2019       Current Facility-Administered Medications:     acetaminophen (Tylenol) tablet 650 mg, 650 mg, oral, q4h PRN, MADHURI Mccray-CNP    alteplase (Cathflo Activase) injection 2 mg, 2 mg, intra-catheter, PRN, MADHURI Mccray-CNP    apixaban (Eliquis) tablet 2.5 mg, 2.5 mg, oral, BID, Judd Tavera DO, 2.5 mg at 02/05/24 1002    benzocaine-menthoL (Dermoplast) " topical spray, , Topical, 4x daily PRN, LILLIE Diaz    dextrose 10 % in water (D10W) infusion, 0.3 g/kg/hr, intravenous, Once PRN, Judd Tavera DO    dextrose 50 % injection 25 g, 25 g, intravenous, q15 min PRN, Judd Tavera DO    doxycycline (Vibramycin) capsule 100 mg, 100 mg, oral, Daily, Judd Barreto MD, 100 mg at 02/05/24 1204    escitalopram (Lexapro) tablet 10 mg, 10 mg, oral, Nightly, Judd Tavera DO, 10 mg at 02/04/24 2013    gabapentin (Neurontin) capsule 100 mg, 100 mg, oral, TID, LILLIE Diaz, 100 mg at 02/05/24 1002    glucagon (Glucagen) injection 1 mg, 1 mg, intramuscular, q15 min PRN, Judd Tavera DO    heparin 1,000 unit/mL injection 2,000 Units, 2,000 Units, intra-catheter, After Dialysis, Saeed Mondragon MD, 1,600 Units at 02/02/24 1455    heparin 1,000 unit/mL injection 2,000 Units, 2,000 Units, intra-catheter, After Dialysis, Saeed Mondragon MD, 1,600 Units at 02/02/24 1454    heparin 1,000 unit/mL injection 2,000 Units, 2,000 Units, intra-catheter, After Dialysis, Saeed Mondragon MD    heparin 1,000 unit/mL injection 2,000 Units, 2,000 Units, intra-catheter, After Dialysis, Saeed Mondragon MD    insulin lispro (HumaLOG) injection 0-10 Units, 0-10 Units, subcutaneous, TID with meals, Judd Tavera DO, 2 Units at 02/05/24 1336    ipratropium-albuteroL (Duo-Neb) 0.5-2.5 mg/3 mL nebulizer solution 3 mL, 3 mL, nebulization, q6h while awake, Judd Barreto MD, 3 mL at 02/05/24 1105    lidocaine (Xylocaine) 10 mg/mL (1 %) injection 50 mg, 5 mL, infiltration, Once, Ronald Casey APRN-CNP    midodrine (Proamatine) tablet 15 mg, 15 mg, oral, TID with meals, Judd Tavera DO, 15 mg at 02/05/24 1204    norepinephrine (Levophed) 8 mg in dextrose 5% 250 mL (0.032 mg/mL) infusion (premix), 0.01-3 mcg/kg/min, intravenous, Continuous, Yusuf De La Garza PA-C, Last Rate: 11.15 mL/hr at 02/05/24 1400, 0.08  mcg/kg/min at 02/05/24 1400    nystatin (Mycostatin) 100,000 unit/gram powder, , Topical, BID, Judd Tavera DO, Given at 02/05/24 1030    nystatin (Mycostatin) cream, , Topical, BID, LILLIE Blair, Given at 02/05/24 1031    ondansetron (Zofran) injection 4 mg, 4 mg, intravenous, q6h PRN, LILLIE Mccray, 4 mg at 02/03/24 0521    oxygen (O2) therapy, , inhalation, Continuous PRN - O2/gases, Judd Tavera DO, 2 L/min at 02/05/24 0800    pantoprazole (ProtoNix) EC tablet 40 mg, 40 mg, oral, Daily, 40 mg at 02/05/24 1002 **OR** pantoprazole (ProtoNix) injection 40 mg, 40 mg, intravenous, Daily, Yusuf De La Garza PA-C, 40 mg at 01/21/24 0824    psyllium (Metamucil) 3.4 gram packet 1 packet, 1 packet, oral, BID, LILLIE Mccray, 1 packet at 02/05/24 1002    sennosides-docusate sodium (Judy-Colace) 8.6-50 mg per tablet 1 tablet, 1 tablet, oral, Nightly PRN, Yusuf De La Garza PA-C    sodium chloride 3 % nebulizer solution 3 mL, 3 mL, nebulization, TID, Charly Grossman MD, 3 mL at 02/05/24 1105    vancomycin (Vancocin) capsule 125 mg, 125 mg, oral, BID, LILLIE Youngblood, 125 mg at 02/05/24 1003    zinc oxide 20 % ointment 1 Application, 1 Application, Topical, BID, LILLIE Blair, 1 Application at 02/05/24 1035    Dietary Orders (From admission, onward)       Start     Ordered    01/25/24 1700  Adult diet Regular; Thin 0  Diet effective now        Question Answer Comment   Diet type Regular    Fluid consistency Thin 0        01/25/24 1659                   Estimated Needs:   Estimated Energy Needs  Total Energy Estimated Needs (kCal):  (3881-2147)  Total Estimated Energy Need per Day (kCal/kg):  (30-35)  Method for Estimating Needs: ABW    Estimated Protein Needs  Total Protein Estimated Needs (g):  (68-85)  Total Protein Estimated Needs (g/kg):  (1.2-1.5)  Method for Estimating Needs: ABW    Estimated Fluid Needs  Total Fluid Estimated Needs (mL):  (UO + 500  mL)  Method for Estimating Needs: HD and multiple wounds      Nutrition Diagnosis   Nutrition Diagnosis:  Malnutrition Diagnosis  Diagnosis Status: Ongoing    Nutrition Diagnosis  Patient has Nutrition Diagnosis: Yes  Diagnosis Status (1): Ongoing  Nutrition Diagnosis 1: Increased nutrient needs  Related to (1): Increased demand for nutrient  As Evidenced by (1): HD     Nutrition Interventions/Recommendations   Nutrition Interventions and Recommendations:    Nutrition Prescription:  Individualized Nutrition Prescription Provided for : 0254-2028 kcals, 68-85 gm protein via Renal diet    Nutrition Interventions:   Food and/or Nutrient Delivery Interventions  Interventions: Meals and snacks, Medical food supplement  Meals and Snacks: General healthful diet  Goal: Provide as ordered    Education Documentation  No documentation found.         Nutrition Monitoring and Evaluation   Monitoring/Evaluation:   Food/Nutrient Related History Monitoring  Monitoring and Evaluation Plan: Energy intake  Energy Intake: Estimated energy intake  Criteria: Pt to consume >/= 50% of estimated needs       Time Spent/Follow-up:   Follow Up  Time Spent (min): 20 minutes  Last Date of Nutrition Visit: 02/05/24  Nutrition Follow-Up Needed?: 5-7 days  Follow up Comment: 2/9/24

## 2024-02-05 NOTE — PROGRESS NOTES
Ayanna Gross is a 73 y.o. female on day 17 of admission presenting with Septic shock (CMS/HCC).    Subjective   Interval History:   Afebrile, no chills  Remains on 2 liters oxygen  Discussed with nursing-no new complaints  Awaiting placement    Objective   Range of Vitals (last 24 hours)  Heart Rate:  []   Temp:  [36.3 °C (97.3 °F)-36.8 °C (98.2 °F)]   Resp:  [12-22]   BP: ()/(27-71)   Weight:  [102 kg (225 lb 5 oz)]   SpO2:  [90 %-100 %]   Daily Weight  02/05/24 : 102 kg (225 lb 5 oz)    Body mass index is 41.21 kg/m².    Physical Exam  Constitutional:       Appearance: Normal appearance.   HENT:      Head: Normocephalic and atraumatic.      Nose: Nose normal.   Eyes:      Extraocular Movements: Extraocular movements intact.      Conjunctiva/sclera: Conjunctivae normal.   Cardiovascular:      Heart sounds: Normal heart sounds, S1 normal and S2 normal.   Pulmonary:      Breath sounds: Decreased breath sounds present.   Abdominal:      General: Bowel sounds are normal.      Palpations: Abdomen is soft.   Musculoskeletal:      Cervical back: Normal range of motion and neck supple.   Skin:     Comments: Stage III sacral ulcer, bilateral posterior heel eschar -photos examined  Neurological:      Mental Status: She is alert.        Antibiotics  aspirin tablet 325 mg  acetaminophen (Tylenol) tablet 650 mg  cefepime (Maxipime) 1 g in dextrose 5 % 50 mL IV  sodium chloride 0.9 % bolus 2,229 mL  apixaban (Eliquis) tablet 2.5 mg  escitalopram (Lexapro) tablet 10 mg  febuxostat (Uloric) tablet 40 mg  fenofibrate (Triglide) tablet 160 mg  gabapentin (Neurontin) capsule 100 mg  midodrine (Proamatine) tablet 15 mg  nystatin (Mycostatin) 100,000 unit/gram powder  oxyCODONE-acetaminophen (Percocet) 5-325 mg per tablet 1 tablet  simvastatin (Zocor) tablet 20 mg  piperacillin-tazobactam-dextrose (Zosyn) IV 2.25 g  vancomycin (Vancocin) capsule 125 mg  oxygen (O2) therapy  dextrose 50 % injection 25 g  glucagon  (Glucagen) injection 1 mg  dextrose 10 % in water (D10W) infusion  insulin lispro (HumaLOG) injection 0-10 Units  nystatin (Mycostatin) 100,000 unit/gram powder 1 Application  vancomycin-diluent combo no.1 (Xellia) IVPB 1,750 mg  piperacillin-tazobactam-dextrose (Zosyn) IV 2.25 g  sodium chloride 0.9 % bolus 500 mL  norepinephrine (Levophed) 8 mg in dextrose 5% 250 mL (0.032 mg/mL) infusion (premix)  vancomycin (Vancocin) placeholder  pantoprazole (ProtoNix) EC tablet 40 mg  pantoprazole (ProtoNix) injection 40 mg  sennosides-docusate sodium (Judy-Colace) 8.6-50 mg per tablet 1 tablet  doxycycline (Vibramycin) in dextrose 5 % in water (D5W) 100 mL  mg  LORazepam (Ativan) injection 2 mg  magnesium sulfate IV 2 g  zinc oxide 20 % ointment 1 Application  nystatin (Mycostatin) cream  norepinephrine (Levophed) 8 mg in dextrose 5% 250 mL (0.032 mg/mL) infusion (premix)  heparin 1,000 unit/mL injection 2,000 Units  heparin 1,000 unit/mL injection 2,000 Units  nystatin (Mycostatin) ointment  acetaminophen (Tylenol) oral liquid 1,000 mg  albumin human 25 % solution 12.5 g  perflutren lipid microspheres (Definity) injection 0.5-10 mL of dilution  sulfur hexafluoride microsphr (Lumason) injection 24.28 mg  perflutren protein A microsphere (Optison) injection 0.5 mL  ipratropium-albuteroL (Duo-Neb) 0.5-2.5 mg/3 mL nebulizer solution 3 mL  sodium chloride 3 % nebulizer solution 3 mL  vancomycin-diluent combo no.1 (Xellia) IVPB 750 mg  sennosides-docusate sodium (Judy-Colace) 8.6-50 mg per tablet 1 tablet  acetaminophen (Tylenol) tablet 650 mg  doxycycline (Vibramycin) capsule 100 mg  magnesium oxide (Mag-Ox) tablet 400 mg  vasopressin (Vasostrict) 0.2 unit/mL infusion  norepinephrine (Levophed) 8 mg in dextrose 5% 250 mL (0.032 mg/mL) infusion (premix)  heparin 1,000 unit/mL injection 2,000 Units  heparin 1,000 unit/mL injection 2,000 Units  albumin human 25 % solution 12.5 g  vancomycin (Xellia) 1 g in 200 mL (Xellia)  IVPB 1 g  ipratropium-albuteroL (Duo-Neb) 0.5-2.5 mg/3 mL nebulizer solution 3 mL  sodium chloride 3 % nebulizer solution 3 mL  albumin human 5 % infusion 12.5 g  heparin 1,000 unit/mL injection 2,000 Units  heparin 1,000 unit/mL injection 2,000 Units  vancomycin (Vancocin) capsule 125 mg  albumin human 25 % solution 12.5 g  heparin 1,000 unit/mL injection 2,000 Units  heparin 1,000 unit/mL injection 2,000 Units  ipratropium-albuteroL (Duo-Neb) 0.5-2.5 mg/3 mL nebulizer solution 3 mL  lidocaine (Xylocaine) 10 mg/mL (1 %) injection 50 mg  sodium phosphate 15 mmol in sodium chloride 0.9% 250 mL IV  sod phos di, mono-K phos mono (K Phos Neutral) tablet 250 mg  potassium, sodium phosphates (Phos-NaK) 280-160-250 mg packet 1 packet  doxycycline (Vibramycin) capsule 100 mg  fludrocortisone (Florinef) tablet 0.1 mg      Relevant Results  Labs  Results from last 72 hours   Lab Units 02/05/24  0506 02/04/24  0508 02/03/24  0407   WBC AUTO x10*3/uL 11.7* 14.1* 14.2*   HEMOGLOBIN g/dL 9.5* 9.7* 8.7*   HEMATOCRIT % 31.4* 30.2* 27.7*   PLATELETS AUTO x10*3/uL 259 243 211       Results from last 72 hours   Lab Units 02/05/24  0506 02/04/24  0508 02/03/24  0407   SODIUM mmol/L 128* 128* 135   POTASSIUM mmol/L 4.9 4.3 4.0   CHLORIDE mmol/L 97 95* 100   CO2 mmol/L 16* 18* 21*   BUN mg/dL 21 16 11   CREATININE mg/dL 3.40* 2.70* 2.20*   GLUCOSE mg/dL 192* 164* 186*   CALCIUM mg/dL 8.5 8.7 8.4*   ANION GAP mmol/L 15 15 14   EGFR mL/min/1.73m*2 14* 18* 23*   PHOSPHORUS mg/dL  --   --  2.2*       Results from last 72 hours   Lab Units 02/05/24  0506 02/04/24  0508 02/03/24  0407   ALK PHOS U/L 234* 223* 216*   BILIRUBIN TOTAL mg/dL 0.8 1.1 1.2   PROTEIN TOTAL g/dL 5.6* 5.7* 5.4*   ALT U/L 12 11 11   AST U/L 29 22 24   ALBUMIN g/dL 2.4* 2.7* 2.7*       Estimated Creatinine Clearance: 17 mL/min (A) (by C-G formula based on SCr of 3.4 mg/dL (H)).  C-Reactive Protein   Date Value Ref Range Status   12/25/2023 5.20 (H) 0.00 - 2.00 mg/dL Final      CRP   Date Value Ref Range Status   06/23/2023 19.9 (H) 0 - 2.0 MG/DL Final     Comment:     Performed at [address] [address]   05/22/2022 1.0 0 - 2.0 MG/DL Final     Comment:     Performed at [address] [address]     Microbiology  No results found for the last 14 days.  Reviewed   Imaging  Upper extremity venous duplex bilateral    Result Date: 1/25/2024           [address] [address]            Phone [phone]  Vascular Lab Report  [facility] US UPPER EXTREMITY VENOUS DUPLEX BILATERAL Patient Name:      BASIA VEGA      Reading Physician:  [id number] Mini Busby MD, RPVI Study Date:        1/25/2024           Ordering Provider:  [id number] ERI BERMAN MRN/PID:           [id number]            Fellow: Accession#:        [id number]        Technologist:       Dara Parr RVT Date of Birth/Age: 1950 / 73 years Technologist 2: Gender:            F                   Encounter#:         [id number] Admission Status:  Inpatient           Location Performed: [facility]  Diagnosis/ICD: Right arm swelling-M79.89; Left arm swelling-M79.89 CPT Codes:     09092 Peripheral venous duplex scan for DVT complete  CONCLUSIONS:  Right Upper Venous: No evidence of acute deep vein thrombus visualized in the right upper extremity. Internal jugular vein was visualized in segments due to IV lines and bandages. Left Upper Venous: No evidence of acute deep vein thrombus visualized in the left upper extremity. Subclavian stent is noted and appears patent. There is a known occluded dialysis access noted.  Additional Findings: Technically difficult exam due to IV lines, bandages, and patient's positioning.  Imaging & Doppler Findings:  Right               Compressible Thrombus        Flow Internal Jugular        Yes        None   Spontaneous/Phasic Subclavian               Yes        None Subclavian Proximal     Yes        None   Spontaneous/Phasic Subclavian Mid          Yes        None Subclavian Distal       Yes        None   Spontaneous/Phasic Axillary                Yes        None       Pulsatile Brachial                Yes        None Cephalic                Yes        None Basilic                 Yes        None  Left                Compress Thrombus        Flow Internal Jugular      Yes      None       Pulsatile Subclavian            Yes      None Subclavian Proximal   Yes      None       Pulsatile Subclavian Mid        Yes      None       Pulsatile Subclavian Distal     Yes      None       Pulsatile Axillary              Yes      None   Spontaneous/Phasic Brachial              Yes      None Cephalic              Yes      None Basilic               Yes      None  [id number] Mini Busby MD, ALICE Electronically signed by [id number] Mini Busby MD, RPVI on 1/25/2024 at 4:06:13 PM  ** Final **     ECG 12 lead    Result Date: 1/24/2024  Sinus tachycardia  with 1st degree AV block Right bundle branch block Possible Lateral infarct , age undetermined Abnormal ECG Confirmed by Yesika Nj ([id number]) on 1/24/2024 6:54:45 AM    XR tibia fibula right 2 views    Result Date: 1/22/2024  Interpreted By:  Real Mendieta, STUDY: XR TIBIA FIBULA RIGHT 2 VIEWS; 1/22/2024 2:15 pm   INDICATION: Signs/Symptoms:evaluate source of infection, osteomyelitis;   COMPARISON: None available.   ACCESSION NUMBER(S): [id number]   ORDERING CLINICIAN: BAN GIFFORD   TECHNIQUE: Views: AP and Lateral of the right tibia and fibula   FINDINGS: RESULT: There is no evidence for fracture or dislocation. Tricompartmental degenerative changes of the right knee. The tibia and fibula appear intact without bony erosion or evidence for osteomyelitis. No other bony or soft tissue abnormality is identified. No subcutaneous gas is noted.       No evidence for acute osseous abnormality. No bony erosion to suggest osteomyelitis.    Signed by: Real Mendieta 1/22/2024 3:15 PM Dictation workstation:   [id number]    XR ankle right 3+ views    Result Date: 1/22/2024  Interpreted By:  Real Mendieta, STUDY: XR ANKLE RIGHT 3+ VIEWS; 1/22/2024 2:15 pm   INDICATION: Signs/Symptoms:Evuluate source of infection, osteomyelitis;   COMPARISON: None available.   ACCESSION NUMBER(S): [id number]   ORDERING CLINICIAN: BAN GIFFORD   TECHNIQUE: Views: AP, Lateral, Oblique, right ankle   FINDINGS: RESULT: There is no evidence for fracture or dislocation. The ankle mortise is intact. Joint spaces appear adequately maintained. No bony erosion to suggest osteomyelitis. No bone lesion or soft tissue abnormality is identified. No subcutaneous gas is seen.       No evidence for acute osseous abnormality. No bony erosion to suggest osteomyelitis.   Signed by: Real Mendieta 1/22/2024 3:14 PM Dictation workstation:   [id number]    Transthoracic Echo (TTE) Complete    Result Date: 1/22/2024           [place] [address]            Phone [phone] TRANSTHORACIC ECHOCARDIOGRAM REPORT  Patient Name:      BASIA Gray Physician:   [id number] Fausto Rowe DO Study Date:        1/22/2024           Ordering Provider:   [id number] BERMAN MRN/PID:           [id number]            Fellow: Accession#:        [id number]        Nurse: Date of Birth/Age: 1950 / 73 years Sonographer:         Tabatha Page RDCS Gender:            F                   Additional Staff: Height:            157.00 cm           Admit Date: Weight:            75.00 kg            Admission Status:    Inpatient - Routine BSA:               1.76 m2             Department Location: [facility] Blood Pressure: 88 /49 mmHg Study Type:    TRANSTHORACIC ECHO (TTE) COMPLETE Diagnosis/ICD: Chronic combined systolic (congestive) and diastolic (congestive)                heart failure  (CHF)-I50.42; Sepsis, unspecified organism-A41.9 Indication:    heart failure,septic shock CPT Codes:     Echo Complete w Full Doppler-43062 Patient History: BMI:               Obese >30 Pertinent History: Sepsis, PVD, A-Fib, CVA, Cancer and HTN. breast                    prosthesis/ca, ESRD,anemia,septic shock,heart failure. Study Detail: The following Echo studies were performed: 2D, M-Mode, Doppler and               color flow. Technically challenging study due to prosthesis,               prominent lung artifact and body habitus.  PHYSICIAN INTERPRETATION: Left Ventricle: Left ventricular systolic function is normal, with an estimated ejection fraction of 70%. There are no regional wall motion abnormalities. The left ventricular cavity size is normal. Left ventricular diastolic filling was indeterminate. Left Atrium: The left atrium is moderately dilated. Right Ventricle: The right ventricle is normal in size. There is normal right ventricular global systolic function. Right Atrium: The right atrium is normal in size. Aortic Valve: The aortic valve is trileaflet. There is no evidence of aortic valve regurgitation. The peak instantaneous gradient of the aortic valve is 2.8 mmHg. Mitral Valve: The mitral valve is normal in structure. There is no evidence of mitral valve regurgitation. Tricuspid Valve: The tricuspid valve is structurally normal. There is trace tricuspid regurgitation. Pulmonic Valve: The pulmonic valve is not well visualized. The pulmonic valve regurgitation was not well visualized. Pericardium: There is no pericardial effusion noted. Aorta: The aortic root is normal.  CONCLUSIONS:  1. Left ventricular systolic function is normal with a 70% estimated ejection fraction.  2. The left atrium is moderately dilated.  3. Left ventricular diastolic filling indeterminate. QUANTITATIVE DATA SUMMARY: 2D MEASUREMENTS:                          Normal Ranges: LAs:           4.50 cm   (2.7-4.0cm) IVSd:           0.61 cm   (0.6-1.1cm) LVPWd:         0.88 cm   (0.6-1.1cm) LVIDd:         3.66 cm   (3.9-5.9cm) LV Mass Index: 41.9 g/m2 LV SYSTOLIC FUNCTION BY 2D PLANIMETRY (MOD):                     Normal Ranges: EF-A4C View: 68.3 % (>=55%) LV DIASTOLIC FUNCTION:                     Normal Ranges: MV Peak E: 1.19 m/s (0.7-1.2 m/s) MV Peak A: 0.90 m/s (0.42-0.7 m/s) E/A Ratio: 1.32     (1.0-2.2) MITRAL VALVE:                 Normal Ranges: MV DT: 192 msec (150-240msec) AORTIC VALVE:                         Normal Ranges: AoV Vmax:      0.84 m/s (<=1.7m/s) AoV Peak P.8 mmHg (<20mmHg) LVOT Max Shimon:  0.47 m/s (<=1.1m/s) LVOT Diameter: 1.90 cm  (1.8-2.4cm) AoV Area,Vmax: 1.57 cm2 (2.5-4.5cm2) TRICUSPID VALVE/RVSP:                   Normal Ranges: IVC Diam: 1.05 cm  [id number] Fausto [facility] DENNIS Electronically signed on 2024 at 2:53:04 PM  ** Final **     XR foot left 3+ views    Result Date: 2024  Interpreted By:  Real Mendieta, STUDY: XR FOOT LEFT 1-2 VIEWS; 2024 4:15 pm   INDICATION: Signs/Symptoms:osteomyelitis. Pain   COMPARISON: 2023   ACCESSION NUMBER(S): [id number]   ORDERING CLINICIAN: BAN GIFFORD   TECHNIQUE: Views:  AP, Lat, Oblique, left foot   FINDINGS: RESULT: There is no evidence for fracture or dislocation. 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 Mild hammertoe deformities. Joint spaces appear adequately maintained. Soft tissue ulceration of the posterior aspect of the heel however no evidence for bony erosion to suggest osteomyelitis.       No evidence for acute fracture or acute bony erosion to suggest osteomyelitis. Chronic changes as described.   Signed by: Real Mendieta 2024 7:15 PM Dictation workstation:   [id number]    CT chest abdomen pelvis wo IV contrast    Result Date: 2024  Interpreted By:  Maria Garcia, STUDY: CT CHEST ABDOMEN PELVIS WO CONTRAST;  2024 11:42 pm   INDICATION: Mental status change. Elevated white blood cell count with  infectious workup.   COMPARISON: 08/20/2023   ACCESSION NUMBER(S): [id number]   ORDERING CLINICIAN: BAN GIFFORD   TECHNIQUE: CT of the chest, abdomen and pelvis was performed with no oral or intravenous contrast administered. Sagittal and coronal reformations were completed by the technologist at the acquisition scanner.   All CT examinations are performed with 1 or more of the following dose reduction techniques: Automated exposure control, adjustment of mA and/or kv according to patient's size, or use of iterative reconstruction techniques.   FINDINGS: Please note that the study is limited without intravenous contrast.   CHEST: Bilateral pleural effusions are present with right effusion moderately small in size and with the left effusion moderately small in size as well. There is shift of the heart and mediastinum to the left of midline due to atelectasis of the left upper lobe. There is consolidation throughout left lower lobe with air bronchograms noted. On the right, there is compressive atelectasis seen posteriorly in the right lower lobe.   There is mild reactive mediastinal adenopathy seen at this time with mediastinal lymph nodes increased in size since prior study. Several of these mediastinal nodes are at the upper limits of normal measuring 8 mm in short axis diameter.   No pericardial effusion is present. The cardiac size is normal with mitral annulus calcification.   There has been prior bilateral mastectomy with reconstruction of the left breast with implant placement. Surgical clips are visible within the left axilla. Stent grafts are visible within the left upper extremity and within the left innominate, subclavian as well as axillary veins.   A peripherally calcified 1.8 cm nodule arises from the lower pole of left thyroid lobe.   ABDOMEN:   LIVER: Unremarkable.   BILE DUCTS: No intrahepatic or extrahepatic biliary ductal dilatation is seen.   GALLBLADDER: Cholelithiasis is observed with some  calcification of the gallbladder wall demonstrated as well. No gallbladder wall thickening or pericholecystic fluid is evident.   PANCREAS: Unremarkable   SPLEEN: Unremarkable   ADRENAL GLANDS: Unremarkable   KIDNEYS AND URETERS: Kidneys are small in size with extensive renal artery calcification demonstrated.   PELVIS:   BLADDER: Amos catheter is present within the decompressed urinary bladder.   REPRODUCTIVE ORGANS: Calcification of the arcuate arteries within the uterus is seen. There is no uterine enlargement or adnexal mass.   BOWEL: A rectal balloon is seen. There is no abnormal distention of the intestinal tract.   VESSELS: There is atherosclerosis of the thoracoabdominal aorta and iliac arteries with calcification in the walls of the celiac, splenic, hepatic, and mesenteric arteries.   PERITONEUM/RETROPERITONEUM/LYMPH NODES: There is a minimal amount of ascites seen about the liver and spleen with mild presacral edema noted.   ABDOMINAL WALL: There is anasarca involving the soft tissues of the abdomen and pelvis. Postoperative change from ventral hernia repair is seen.   BONES: Bilateral sacroiliitis is seen. There is lumbar dextroscoliosis. Chronic rotator cuff tear is present bilaterally with osteoarthritis of both shoulders, right greater than left.       Chest 1.  Moderately small bilateral pleural effusions with left upper lobe atelectasis and compressive atelectasis seen posteriorly in right lower lobe. A left lower lobe pneumonia is identified. There is extensive airspace consolidation within left lower lobe with air bronchograms observed. Mild mediastinal reactive adenopathy is present as well.   Abdomen-Pelvis 1.  Cholelithiasis without evidence of cholecystitis. 2. Small amount of ascites with mild presacral edema and anasarca within the soft tissues of the abdominal wall. 3. Renal atrophy with extensive vascular calcifications.     MACRO: None   Signed by: Maria Garcia 1/21/2024 8:30 AM Dictation  workstation:   [id number]    CT head wo IV contrast    Result Date: 1/21/2024  Interpreted By:  Maria Garcia, STUDY: CT HEAD WO IV CONTRAST 1/20/2024 11:42 pm   INDICATION: Signs/Symptoms:mental status changes   COMPARISON: 12/11/2023   ACCESSION NUMBER(S): [id number]   ORDERING CLINICIAN: BAN GIFFORD   TECHNIQUE: Unenhanced axial images of the brain are completed.   All CT examinations are performed with 1 or more of the following dose reduction techniques: Automated exposure control, adjustment of mA and/or kv according to patient's size, or use of iterative reconstruction techniques.   FINDINGS: Helical unenhanced axial images of the brain demonstrate a mild to moderate degree of ventricular enlargement with proportionate widening of the sulci and sylvian fissures. There is no midline shift, mass effect, extra-axial fluid collection, or acute intracranial hemorrhage. There is diminished density seen in the periventricular white matter indicating chronic microvascular ischemic disease. There is calcified plaque seen within the distal vertebral and internal carotid arteries bilaterally. No calvarial abnormality is seen.       Atrophy and chronic microvascular ischemic disease without acute intracranial process.   Signed by: Maria Garcia 1/21/2024 8:09 AM Dictation workstation:   [id number]    XR chest 1 view    Result Date: 1/20/2024  Interpreted By:  Brendon Perez, STUDY: XR CHEST 1 VIEW; 1/20/2024 5:03 pm   INDICATION: CLINICAL INFORMATION: Signs/Symptoms:Insertion right IJ central line, also left thoracentesis.   COMPARISON: 01/19/2024 at 1338 hours   ACCESSION NUMBER(S): [id number]   ORDERING CLINICIAN: BOZENA ANTONIO   TECHNIQUE: Portable chest one view.   FINDINGS: The cardiac size is indeterminate in view of the AP projection. There is no change in the right-sided dual port central venous catheter. There is a new right internal jugular venous catheter present with the tip at approximately same level as  the dual port central venous catheter, overlying the mid to lower right atrium. There is no evidence for pneumothorax. Patient has a history of left thoracentesis without evidence for pneumothorax on the left. There is bilateral basilar alveolar infiltrate and effusions. Left effusion is decreased compared to the study from 1 day earlier.   Surgical clips are identified within left chest wall. Endovascular stent is identified in the distribution of the left subclavian artery.       1. Status post right-sided central venous catheter placement as described above with the tip overlying the mid to caudal aspect of the right atrium. There is no evidence for pneumothorax. 2. No evidence for left-sided pneumothorax after left-sided thoracentesis. Decrease in the left effusion. 3. Bilateral basilar infiltrates and effusions are present. Follow-up to assure complete clearing is suggested.   MACRO: none   Signed by: Brendon Perez 1/20/2024 5:11 PM Dictation workstation:   [id number]    XR chest 1 view    Result Date: 1/19/2024  Interpreted By:  Real Mendieta, STUDY: XR CHEST 1 VIEW; 1/19/2024 1:38 pm   INDICATION: Signs/Symptoms:dyspnea.   COMPARISON: 12/26/2023   ACCESSION NUMBER(S): [id number]   ORDERING CLINICIAN: EMELY GOLDSMITH   TECHNIQUE: 1 view of the chest was performed.   FINDINGS: There may be a minimal right pleural effusion. Slight prominence of the interstitium otherwise the right lung is clear. Improved appearance of the left lung with improved aeration of the right upper lobe. There is opacification of the left lower lobe possibly due to large pleural effusion which is similar to the prior study. No pneumothorax. Right-sided dual lumen central line catheter with tip at the proximal atrium. The cardiomediastinal silhouette is within normal limits. Left-sided subclavian stents are again noted.       Improved aeration and appearance of the left lung superiorly however there is a persistent large left pleural  effusion and opacification of the left lower lobe.   Signed by: Real Mendieta 1/19/2024 1:44 PM Dictation workstation:   [id number]    Electrocardiogram, 12-lead PRN ACS symptoms    Result Date: 1/7/2024  Sinus rhythm Right bundle branch block Septal infarct (cited on or before 06-DEC-2023) Abnormal ECG When compared with ECG of 06-DEC-2023 13:06, Questionable change in initial forces of Septal leads Confirmed by Herminio Lemus ([id number]) on 1/7/2024 11:04:01 AM     Assessment/Plan     Septic shock-on pressors-resolving  Left-sided pleural effusion   Left lower lobe pneumonia-treated  Proteus urinary tract infection-treated  History of MRSA endocarditis  History of C. 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

## 2024-02-05 NOTE — NURSING NOTE
Spoke with RN, pt having bedside dialysis until approximately 7pm. Will plan for PICC insertion in am.

## 2024-02-05 NOTE — CARE PLAN
Problem: Respiratory  Goal: Clear secretions with interventions this shift  Outcome: Progressing  Goal: Minimize anxiety/maximize coping throughout shift  Outcome: Progressing  Goal: Minimal/no exertional discomfort or dyspnea this shift  Outcome: Progressing  Goal: Patent airway maintained this shift  Outcome: Progressing  Goal: Tolerate pulmonary toileting this shift  Outcome: Progressing  Goal: Verbalize decreased shortness of breath this shift  Outcome: Progressing  Goal: Wean oxygen to maintain O2 saturation per order/standard this shift  Outcome: Progressing  Goal: Increase self care and/or family involvement in next 24 hours  Outcome: Progressing

## 2024-02-05 NOTE — PROGRESS NOTES
Ayanna Gross is a 73 y.o. female on day 17 of admission presenting with Septic shock (CMS/[facility]).    Critical Care Medicine Progress Note    Admitted on:     1/19/2024  Length of Stay: 17 day(s)     Interval History     Still on levo 0.08  No other complaints  Did not want to get in a chair.    Objective   Objective     Vitals:    02/05/24 1300   Weight: 94.7 kg (208 lb 12.4 oz)   Body mass index is 38.19 kg/m².        2/5/2024     5:05 PM 2/5/2024     5:15 PM 2/5/2024     5:30 PM 2/5/2024     5:46 PM 2/5/2024     5:48 PM 2/5/2024     6:00 PM 2/5/2024     6:15 PM   Vitals   Systolic 119 108 88 57 90 89 91   Diastolic 106 91 69 37 47 52 55   Heart Rate 98 97 100 96 98 97 107   Resp 18 18 16 19 19 15 18        Vent settings:       Intake/Output Summary (Last 24 hours) at 2/5/2024 1859  Last data filed at 2/5/2024 1838  Gross per 24 hour   Intake 986.64 ml   Output 1600 ml   Net -613.36 ml     Physical exam:  -----------------  Awake and oriented to place and person  Cardiovascular:      Rate and Rhythm: Normal rate. Rhythm irregular.   Pulmonary:      Effort: Pulmonary effort is normal.   Abdominal:      General: Abdomen is flat.   Musculoskeletal:         General: Deformity present. Normal range of motion.      Cervical back: Normal range of motion.      Comments: Abscence of left hand first finger   Skin:     General: Skin is warm.      Capillary Refill: Capillary refill takes less than 2 seconds.      Comments: Skin tear healing to L hand  Wound to bilateral feet and sacrum     Medications     Scheduled Medications:   apixaban, 2.5 mg, oral, BID  doxycylcine, 100 mg, oral, Daily  escitalopram, 10 mg, oral, Nightly  gabapentin, 100 mg, oral, TID  heparin, 2,000 Units, intra-catheter, After Dialysis  heparin, 2,000 Units, intra-catheter, After Dialysis  heparin, 2,000 Units, intra-catheter, After Dialysis  heparin, 2,000 Units, intra-catheter, After Dialysis  insulin lispro, 0-10 Units, subcutaneous, TID with  meals  ipratropium-albuteroL, 3 mL, nebulization, q6h while awake  lidocaine, 5 mL, infiltration, Once  midodrine, 15 mg, oral, TID with meals  nystatin, , Topical, BID  nystatin, , Topical, BID  pantoprazole, 40 mg, oral, Daily   Or  pantoprazole, 40 mg, intravenous, Daily  psyllium, 1 packet, oral, BID  sodium chloride, 3 mL, nebulization, TID  vancomycin, 125 mg, oral, BID  zinc oxide, 1 Application, Topical, BID       Continuous Medications:   norepinephrine, 0.01-3 mcg/kg/min, Last Rate: 0.09 mcg/kg/min (02/05/24 1800)       PRN Medications:     Labs     Results from last 72 hours   Lab Units 02/05/24  0506 02/04/24  0508 02/03/24  0407   GLUCOSE mg/dL 192* 164* 186*   SODIUM mmol/L 128* 128* 135   POTASSIUM mmol/L 4.9 4.3 4.0   CHLORIDE mmol/L 97 95* 100   CO2 mmol/L 16* 18* 21*   BUN mg/dL 21 16 11   CREATININE mg/dL 3.40* 2.70* 2.20*   EGFR mL/min/1.73m*2 14* 18* 23*   CALCIUM mg/dL 8.5 8.7 8.4*   ALBUMIN g/dL 2.4* 2.7* 2.7*   MAGNESIUM mg/dL 2.10 1.90 1.80   PHOSPHORUS mg/dL  --   --  2.2*     Results from last 72 hours   Lab Units 02/05/24  0506 02/04/24  0508 02/03/24  0407   ALK PHOS U/L 234* 223* 216*   ALT U/L 12 11 11   AST U/L 29 22 24   BILIRUBIN TOTAL mg/dL 0.8 1.1 1.2   PROTEIN TOTAL g/dL 5.6* 5.7* 5.4*             Results from last 72 hours   Lab Units 02/05/24  0506 02/04/24  0508 02/03/24  0407   WBC AUTO x10*3/uL 11.7* 14.1* 14.2*   NRBC AUTO /100 WBCs 0.0 0.0 0.0   RBC AUTO x10*6/uL 3.13* 3.18* 2.93*   HEMOGLOBIN g/dL 9.5* 9.7* 8.7*   HEMATOCRIT % 31.4* 30.2* 27.7*   MCV fL 100 95 95   MCH pg 30.4 30.5 29.7   MCHC g/dL 30.3* 32.1 31.4*   RDW % 24.1* 24.0* 24.1*   PLATELETS AUTO x10*3/uL 259 243 211         Lab Results   Component Value Date    BLOODCULT No growth at 4 days -  FINAL REPORT 01/19/2024    BLOODCULT No growth at 4 days -  FINAL REPORT 01/19/2024    GRAMSTAIN (A) 12/15/2023     Gram stain indicates specimen contains significant salivary contamination.     Lab Results   Component  Value Date    URINECULTURE >100,000 Proteus mirabilis (A) 01/19/2024       Imaging and Diagnostic Studies     Lab/Radiology/Diagnostic Review:  Results for orders placed or performed during the hospital encounter of 01/19/24 (from the past 24 hour(s))   POCT GLUCOSE   Result Value Ref Range    POCT Glucose 168 (H) 74 - 99 mg/dL   CBC   Result Value Ref Range    WBC 11.7 (H) 4.4 - 11.3 x10*3/uL    nRBC 0.0 0.0 - 0.0 /100 WBCs    RBC 3.13 (L) 4.00 - 5.20 x10*6/uL    Hemoglobin 9.5 (L) 12.0 - 16.0 g/dL    Hematocrit 31.4 (L) 36.0 - 46.0 %     80 - 100 fL    MCH 30.4 26.0 - 34.0 pg    MCHC 30.3 (L) 32.0 - 36.0 g/dL    RDW 24.1 (H) 11.5 - 14.5 %    Platelets 259 150 - 450 x10*3/uL   Comprehensive metabolic panel   Result Value Ref Range    Glucose 192 (H) 65 - 99 mg/dL    Sodium 128 (L) 133 - 145 mmol/L    Potassium 4.9 3.4 - 5.1 mmol/L    Chloride 97 97 - 107 mmol/L    Bicarbonate 16 (L) 24 - 31 mmol/L    Urea Nitrogen 21 8 - 25 mg/dL    Creatinine 3.40 (H) 0.40 - 1.60 mg/dL    eGFR 14 (L) >60 mL/min/1.73m*2    Calcium 8.5 8.5 - 10.4 mg/dL    Albumin 2.4 (L) 3.5 - 5.0 g/dL    Alkaline Phosphatase 234 (H) 35 - 125 U/L    Total Protein 5.6 (L) 5.9 - 7.9 g/dL    AST 29 5 - 40 U/L    Bilirubin, Total 0.8 0.1 - 1.2 mg/dL    ALT 12 5 - 40 U/L    Anion Gap 15 <=19 mmol/L   Magnesium   Result Value Ref Range    Magnesium 2.10 1.60 - 3.10 mg/dL   POCT GLUCOSE   Result Value Ref Range    POCT Glucose 178 (H) 74 - 99 mg/dL   POCT GLUCOSE   Result Value Ref Range    POCT Glucose 196 (H) 74 - 99 mg/dL   POCT GLUCOSE   Result Value Ref Range    POCT Glucose 132 (H) 74 - 99 mg/dL     Upper extremity venous duplex bilateral    Result Date: 1/25/2024           [facility] [id number] [place], [place] [address]            Phone [phone]  Vascular Lab Report  [facility] US UPPER EXTREMITY VENOUS DUPLEX BILATERAL Patient Name:      BASIA Gray Physician:  [id number] Busby                                                             MD, ALICE Study Date:        1/25/2024           Ordering Provider:  [id number] ERI PINO CULLEN MRN/PID:           [id number]            Fellow: Accession#:        [id number]        Technologist:       Dara Parr RVRICHIE Date of Birth/Age: 1950 / 73 years Technologist 2: Gender:            F                   Encounter#:         [id number] Admission Status:  Inpatient           Location Performed: [facility]  Diagnosis/ICD: Right arm swelling-M79.89; Left arm swelling-M79.89 CPT Codes:     83391 Peripheral venous duplex scan for DVT complete  CONCLUSIONS:  Right Upper Venous: No evidence of acute deep vein thrombus visualized in the right upper extremity. Internal jugular vein was visualized in segments due to IV lines and bandages. Left Upper Venous: No evidence of acute deep vein thrombus visualized in the left upper extremity. Subclavian stent is noted and appears patent. There is a known occluded dialysis access noted.  Additional Findings: Technically difficult exam due to IV lines, bandages, and patient's positioning.  Imaging & Doppler Findings:  Right               Compressible Thrombus        Flow Internal Jugular        Yes        None   Spontaneous/Phasic Subclavian              Yes        None Subclavian Proximal     Yes        None   Spontaneous/Phasic Subclavian Mid          Yes        None Subclavian Distal       Yes        None   Spontaneous/Phasic Axillary                Yes        None       Pulsatile Brachial                Yes        None Cephalic                Yes        None Basilic                 Yes        None  Left                Compress Thrombus        Flow Internal Jugular      Yes      None       Pulsatile Subclavian            Yes      None Subclavian Proximal   Yes      None       Pulsatile Subclavian Mid        Yes      None       Pulsatile Subclavian Distal     Yes      None       Pulsatile Axillary              Yes      None    Spontaneous/Phasic Brachial              Yes      None Cephalic              Yes      None Basilic               Yes      None  [id number] Mini Busby MD, ALICE Electronically signed by [id number] ALICE Kelly MD on 1/25/2024 at 4:06:13 PM  ** Final **     ECG 12 lead    Result Date: 1/24/2024  Sinus tachycardia  with 1st degree AV block Right bundle branch block Possible Lateral infarct , age undetermined Abnormal ECG Confirmed by Yesika Nj ([id number]) on 1/24/2024 6:54:45 AM    XR tibia fibula right 2 views    Result Date: 1/22/2024  Interpreted By:  Real Mendieta, STUDY: XR TIBIA FIBULA RIGHT 2 VIEWS; 1/22/2024 2:15 pm   INDICATION: Signs/Symptoms:evaluate source of infection, osteomyelitis;   COMPARISON: None available.   ACCESSION NUMBER(S): [id number]   ORDERING CLINICIAN: BAN GIFFORD   TECHNIQUE: Views: AP and Lateral of the right tibia and fibula   FINDINGS: RESULT: There is no evidence for fracture or dislocation. Tricompartmental degenerative changes of the right knee. The tibia and fibula appear intact without bony erosion or evidence for osteomyelitis. No other bony or soft tissue abnormality is identified. No subcutaneous gas is noted.       No evidence for acute osseous abnormality. No bony erosion to suggest osteomyelitis.   Signed by: Real Mendieta 1/22/2024 3:15 PM Dictation workstation:   [id number]    XR ankle right 3+ views    Result Date: 1/22/2024  Interpreted By:  Real Mendieta, STUDY: XR ANKLE RIGHT 3+ VIEWS; 1/22/2024 2:15 pm   INDICATION: Signs/Symptoms:Evuluate source of infection, osteomyelitis;   COMPARISON: None available.   ACCESSION NUMBER(S): [id number]   ORDERING CLINICIAN: BAN GIFFORD   TECHNIQUE: Views: AP, Lateral, Oblique, right ankle   FINDINGS: RESULT: There is no evidence for fracture or dislocation. The ankle mortise is intact. Joint spaces appear adequately maintained. No bony erosion to suggest osteomyelitis. No bone lesion or soft tissue abnormality is  identified. No subcutaneous gas is seen.       No evidence for acute osseous abnormality. No bony erosion to suggest osteomyelitis.   Signed by: Real Mendieta 1/22/2024 3:14 PM Dictation workstation:   [id number]    Transthoracic Echo (TTE) Complete    Result Date: 1/22/2024           [facility] [address] [place], [place] [address]            Phone [phone] TRANSTHORACIC ECHOCARDIOGRAM REPORT  Patient Name:      BASIA Gray Physician:   [id number] Fausto Rowe DO Study Date:        1/22/2024           Ordering Provider:   [id number] ERI BERMAN MRN/PID:           [id number]            Fellow: Accession#:        [id number]        Nurse: Date of Birth/Age: 1950 / 73 years Sonographer:         Tabatha Page RDCS Gender:            F                   Additional Staff: Height:            157.00 cm           Admit Date: Weight:            75.00 kg            Admission Status:    Inpatient - Routine BSA:               1.76 m2             Department Location: [facility] ICU Blood Pressure: 88 /49 mmHg Study Type:    TRANSTHORACIC ECHO (TTE) COMPLETE Diagnosis/ICD: Chronic combined systolic (congestive) and diastolic (congestive)                heart failure (CHF)-I50.42; Sepsis, unspecified organism-A41.9 Indication:    heart failure,septic shock CPT Codes:     Echo Complete w Full Doppler-18373 Patient History: BMI:               Obese >30 Pertinent History: Sepsis, PVD, A-Fib, CVA, Cancer and HTN. breast                    prosthesis/ca, ESRD,anemia,septic shock,heart failure. Study Detail: The following Echo studies were performed: 2D, M-Mode, Doppler and               color flow. Technically challenging study due to prosthesis,               prominent lung artifact and body habitus.  PHYSICIAN INTERPRETATION: Left Ventricle: Left ventricular systolic function is normal, with an estimated ejection fraction of 70%. There are  no regional wall motion abnormalities. The left ventricular cavity size is normal. Left ventricular diastolic filling was indeterminate. Left Atrium: The left atrium is moderately dilated. Right Ventricle: The right ventricle is normal in size. There is normal right ventricular global systolic function. Right Atrium: The right atrium is normal in size. Aortic Valve: The aortic valve is trileaflet. There is no evidence of aortic valve regurgitation. The peak instantaneous gradient of the aortic valve is 2.8 mmHg. Mitral Valve: The mitral valve is normal in structure. There is no evidence of mitral valve regurgitation. Tricuspid Valve: The tricuspid valve is structurally normal. There is trace tricuspid regurgitation. Pulmonic Valve: The pulmonic valve is not well visualized. The pulmonic valve regurgitation was not well visualized. Pericardium: There is no pericardial effusion noted. Aorta: The aortic root is normal.  CONCLUSIONS:  1. Left ventricular systolic function is normal with a 70% estimated ejection fraction.  2. The left atrium is moderately dilated.  3. Left ventricular diastolic filling indeterminate. QUANTITATIVE DATA SUMMARY: 2D MEASUREMENTS:                          Normal Ranges: LAs:           4.50 cm   (2.7-4.0cm) IVSd:          0.61 cm   (0.6-1.1cm) LVPWd:         0.88 cm   (0.6-1.1cm) LVIDd:         3.66 cm   (3.9-5.9cm) LV Mass Index: 41.9 g/m2 LV SYSTOLIC FUNCTION BY 2D PLANIMETRY (MOD):                     Normal Ranges: EF-A4C View: 68.3 % (>=55%) LV DIASTOLIC FUNCTION:                     Normal Ranges: MV Peak E: 1.19 m/s (0.7-1.2 m/s) MV Peak A: 0.90 m/s (0.42-0.7 m/s) E/A Ratio: 1.32     (1.0-2.2) MITRAL VALVE:                 Normal Ranges: MV DT: 192 msec (150-240msec) AORTIC VALVE:                         Normal Ranges: AoV Vmax:      0.84 m/s (<=1.7m/s) AoV Peak P.8 mmHg (<20mmHg) LVOT Max Shimon:  0.47 m/s (<=1.1m/s) LVOT Diameter: 1.90 cm  (1.8-2.4cm) AoV Area,Vmax: 1.57 cm2  (2.5-4.5cm2) TRICUSPID VALVE/RVSP:                   Normal Ranges: IVC Diam: 1.05 cm  [id number] Fausto Rowe DO Electronically signed on 1/22/2024 at 2:53:04 PM  ** Final **     XR foot left 3+ views    Result Date: 1/21/2024  Interpreted By:  Real Mendieta, STUDY: XR FOOT LEFT 1-2 VIEWS; 1/21/2024 4:15 pm   INDICATION: Signs/Symptoms:osteomyelitis. Pain   COMPARISON: 12/07/2023   ACCESSION NUMBER(S): [id number]   ORDERING CLINICIAN: BAN GIFFORD   TECHNIQUE: Views:  AP, Lat, Oblique, left foot   FINDINGS: RESULT: There is no evidence for fracture or dislocation. 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 Mild hammertoe deformities. Joint spaces appear adequately maintained. Soft tissue ulceration of the posterior aspect of the heel however no evidence for bony erosion to suggest osteomyelitis.       No evidence for acute fracture or acute bony erosion to suggest osteomyelitis. Chronic changes as described.   Signed by: Real Mendieta 1/21/2024 7:15 PM Dictation workstation:   [id number]    CT chest abdomen pelvis wo IV contrast    Result Date: 1/21/2024  Interpreted By:  Maria Garcia, STUDY: CT CHEST ABDOMEN PELVIS WO CONTRAST;  1/20/2024 11:42 pm   INDICATION: Mental status change. Elevated white blood cell count with infectious workup.   COMPARISON: 08/20/2023   ACCESSION NUMBER(S): [id number]   ORDERING CLINICIAN: BAN GIFFORD   TECHNIQUE: CT of the chest, abdomen and pelvis was performed with no oral or intravenous contrast administered. Sagittal and coronal reformations were completed by the technologist at the acquisition scanner.   All CT examinations are performed with 1 or more of the following dose reduction techniques: Automated exposure control, adjustment of mA and/or kv according to patient's size, or use of iterative reconstruction techniques.   FINDINGS: Please note that the study is limited without intravenous contrast.   CHEST: Bilateral pleural effusions are present  with right effusion moderately small in size and with the left effusion moderately small in size as well. There is shift of the heart and mediastinum to the left of midline due to atelectasis of the left upper lobe. There is consolidation throughout left lower lobe with air bronchograms noted. On the right, there is compressive atelectasis seen posteriorly in the right lower lobe.   There is mild reactive mediastinal adenopathy seen at this time with mediastinal lymph nodes increased in size since prior study. Several of these mediastinal nodes are at the upper limits of normal measuring 8 mm in short axis diameter.   No pericardial effusion is present. The cardiac size is normal with mitral annulus calcification.   There has been prior bilateral mastectomy with reconstruction of the left breast with implant placement. Surgical clips are visible within the left axilla. Stent grafts are visible within the left upper extremity and within the left innominate, subclavian as well as axillary veins.   A peripherally calcified 1.8 cm nodule arises from the lower pole of left thyroid lobe.   ABDOMEN:   LIVER: Unremarkable.   BILE DUCTS: No intrahepatic or extrahepatic biliary ductal dilatation is seen.   GALLBLADDER: Cholelithiasis is observed with some calcification of the gallbladder wall demonstrated as well. No gallbladder wall thickening or pericholecystic fluid is evident.   PANCREAS: Unremarkable   SPLEEN: Unremarkable   ADRENAL GLANDS: Unremarkable   KIDNEYS AND URETERS: Kidneys are small in size with extensive renal artery calcification demonstrated.   PELVIS:   BLADDER: Amos catheter is present within the decompressed urinary bladder.   REPRODUCTIVE ORGANS: Calcification of the arcuate arteries within the uterus is seen. There is no uterine enlargement or adnexal mass.   BOWEL: A rectal balloon is seen. There is no abnormal distention of the intestinal tract.   VESSELS: There is atherosclerosis of the  thoracoabdominal aorta and iliac arteries with calcification in the walls of the celiac, splenic, hepatic, and mesenteric arteries.   PERITONEUM/RETROPERITONEUM/LYMPH NODES: There is a minimal amount of ascites seen about the liver and spleen with mild presacral edema noted.   ABDOMINAL WALL: There is anasarca involving the soft tissues of the abdomen and pelvis. Postoperative change from ventral hernia repair is seen.   BONES: Bilateral sacroiliitis is seen. There is lumbar dextroscoliosis. Chronic rotator cuff tear is present bilaterally with osteoarthritis of both shoulders, right greater than left.       Chest 1.  Moderately small bilateral pleural effusions with left upper lobe atelectasis and compressive atelectasis seen posteriorly in right lower lobe. A left lower lobe pneumonia is identified. There is extensive airspace consolidation within left lower lobe with air bronchograms observed. Mild mediastinal reactive adenopathy is present as well.   Abdomen-Pelvis 1.  Cholelithiasis without evidence of cholecystitis. 2. Small amount of ascites with mild presacral edema and anasarca within the soft tissues of the abdominal wall. 3. Renal atrophy with extensive vascular calcifications.     MACRO: None   Signed by: Maria Garcia 1/21/2024 8:30 AM Dictation workstation:   [id number]    CT head wo IV contrast    Result Date: 1/21/2024  Interpreted By:  Maria Garcia, STUDY: CT HEAD WO IV CONTRAST 1/20/2024 11:42 pm   INDICATION: Signs/Symptoms:mental status changes   COMPARISON: 12/11/2023   ACCESSION NUMBER(S): [id number]   ORDERING CLINICIAN: BAN GIFFORD   TECHNIQUE: Unenhanced axial images of the brain are completed.   All CT examinations are performed with 1 or more of the following dose reduction techniques: Automated exposure control, adjustment of mA and/or kv according to patient's size, or use of iterative reconstruction techniques.   FINDINGS: Helical unenhanced axial images of the brain demonstrate a mild  to moderate degree of ventricular enlargement with proportionate widening of the sulci and sylvian fissures. There is no midline shift, mass effect, extra-axial fluid collection, or acute intracranial hemorrhage. There is diminished density seen in the periventricular white matter indicating chronic microvascular ischemic disease. There is calcified plaque seen within the distal vertebral and internal carotid arteries bilaterally. No calvarial abnormality is seen.       Atrophy and chronic microvascular ischemic disease without acute intracranial process.   Signed by: Maria Garcia 1/21/2024 8:09 AM Dictation workstation:   [id number]    XR chest 1 view    Result Date: 1/20/2024  Interpreted By:  Brendon Perez, STUDY: XR CHEST 1 VIEW; 1/20/2024 5:03 pm   INDICATION: CLINICAL INFORMATION: Signs/Symptoms:Insertion right IJ central line, also left thoracentesis.   COMPARISON: 01/19/2024 at 1338 hours   ACCESSION NUMBER(S): [id number]   ORDERING CLINICIAN: BOZENA ANTONIO   TECHNIQUE: Portable chest one view.   FINDINGS: The cardiac size is indeterminate in view of the AP projection. There is no change in the right-sided dual port central venous catheter. There is a new right internal jugular venous catheter present with the tip at approximately same level as the dual port central venous catheter, overlying the mid to lower right atrium. There is no evidence for pneumothorax. Patient has a history of left thoracentesis without evidence for pneumothorax on the left. There is bilateral basilar alveolar infiltrate and effusions. Left effusion is decreased compared to the study from 1 day earlier.   Surgical clips are identified within left chest wall. Endovascular stent is identified in the distribution of the left subclavian artery.       1. Status post right-sided central venous catheter placement as described above with the tip overlying the mid to caudal aspect of the right atrium. There is no evidence for pneumothorax.  2. No evidence for left-sided pneumothorax after left-sided thoracentesis. Decrease in the left effusion. 3. Bilateral basilar infiltrates and effusions are present. Follow-up to assure complete clearing is suggested.   MACRO: none   Signed by: Brendon Perez 1/20/2024 5:11 PM Dictation workstation:   [id number]    XR chest 1 view    Result Date: 1/19/2024  Interpreted By:  Real Mendieta, STUDY: XR CHEST 1 VIEW; 1/19/2024 1:38 pm   INDICATION: Signs/Symptoms:dyspnea.   COMPARISON: 12/26/2023   ACCESSION NUMBER(S): [id number]   ORDERING CLINICIAN: EMELY GOLDSMITH   TECHNIQUE: 1 view of the chest was performed.   FINDINGS: There may be a minimal right pleural effusion. Slight prominence of the interstitium otherwise the right lung is clear. Improved appearance of the left lung with improved aeration of the right upper lobe. There is opacification of the left lower lobe possibly due to large pleural effusion which is similar to the prior study. No pneumothorax. Right-sided dual lumen central line catheter with tip at the proximal atrium. The cardiomediastinal silhouette is within normal limits. Left-sided subclavian stents are again noted.       Improved aeration and appearance of the left lung superiorly however there is a persistent large left pleural effusion and opacification of the left lower lobe.   Signed by: Real Mendieta 1/19/2024 1:44 PM Dictation workstation:   [id number]         Assessment / Plan       PROBLEM LIST:  - End stage heart failure with preserved EF - on vasopressors  - End stage renal disease on HD  - Cardiogenic shock  - Acute metabolic encephalopathy  - Failure to wean from vasopressors  - Goals of care discussion    MANAGEMENT PLAN:  - Discussed at length with daughter about the end stage nature of her disease with multiple organ failure  - Peer to peer done with insurance, awaiting final approval  - Family will update us with goals of care  - Cont Doxy and Vanc  - Routine ICU care  -  PT/OT    I have personally interviewed and examined the patient.  I have personally verified elements of the exam listed above. Interval changes or irregularities are as noted.  I have personally and independently reviewed laboratory, radiographic and procedural data.  I have personally reviewed the problem list above and concur. Changes, if any, are noted.  I have personally reviewed the plan list above and concur. Changes, if any, are noted.    The patient has high probability of compromise including but not limited to organ system failure, mechanical respiratory and circulatory support, cardiac arrest and death. I have discussed this in detail with the family / caregivers.    I have personally spent 50 minutes of face to face critical care time (not including billable procedures billed separately) in taking care of the patient.  I have personally answered all questions to the satisfaction of the patient and / or family members to their satisfaction.        Minh Shane MD

## 2024-02-06 NOTE — PROGRESS NOTES
Cruzito Reyes  [address] [facility] [address]            12/27/2018      Dear Cruzito,    Recently you were referred to our Gastroenterology Department by your primary care provider for colorectal cancer screening. I would like to share some important information about a colonoscopy. Colorectal cancer is a leading cause of cancer death in this [place]. Colorectal cancer can be stopped in its tracks or even prevented with screening tests.    Our office attempted to contact you by phone on December 27, 2019.  Please call [phone] to schedule your procedure with one of our Gastroenterology providers at either [facility] in [place] or [facility] in [place]. If you already spoke to someone in the GI Department and have your procedure scheduled you can disregard this letter.     If you decide to have your colonoscopy elsewhere, please call us at [phone] with the information so that we can update your record.    Your good health is important to us, a colonoscopy is important for you.    Sincerely,      [facility]  [address] [facility] [address]   CONSULT PROGRESS NOTES    SERVICE DATE: 2/6/2024   SERVICE TIME: 10:18 AM    CONSULTING SERVICE: Nephrology    ASSESSMENT AND PLAN   1.  End-stage renal disease  2.  Hypotension  3.  Hyponatremia  4.  Hypokalemia  5.  Anemia of chronic kidney disease     Dialysis probably tomorrow with [facility] again with low temperature and using mild fluid removal.    Hyponatremia from volume overload in this patient with anuria.  Hypokalemia improved, now using 3K bath.  Hemoglobin is low, use Retacrit 10,000 units IV 3 times a week with dialysis.  She remains dependent on norepinephrine infusion otherwise her blood pressure drops dangerously low.  I personally reviewed the echocardiogram with Dr. Shane, which may demonstrate end-stage heart failure.  I have discussed this with the patient and her daughter.  She had been considered for LTAC for placement, but it sounds like this was denied by insurance.  She had a meeting with hospice recently, but it does not sound like she will approach this disposition.    She does not need, nor do I believe she would derive any benefit from CRRT.  Magnesium balance has improved.  I have no specific objection to keeping the arterial line out.  I also have no specific objection to transition from a midline to a PICC.  Sounds like in efforts to keep her blood pressure high, she may need to have her norepinephrine infusion through her dialysis catheter while the midline is switched to a PICC.  While this is not ideal, it is probably necessary.  None of her options for disposition are great.  She has a poor overall prognosis, but it has been difficult to get the patient and her daughter/family to understand this.    SUBJECTIVE  INTERVAL HPI: She has no new issues or concerns.  She remains hypotensive and on the same dose of the Levophed infusion.  Dialysis yesterday with 1.5 L volume removal.    MEDICATIONS:  apixaban, 2.5 mg, oral, BID  doxycylcine, 100 mg, oral, Daily  escitalopram, 10 mg, oral,  Nightly  gabapentin, 100 mg, oral, TID  heparin, 2,000 Units, intra-catheter, After Dialysis  heparin, 2,000 Units, intra-catheter, After Dialysis  heparin, 2,000 Units, intra-catheter, After Dialysis  heparin, 2,000 Units, intra-catheter, After Dialysis  insulin lispro, 0-10 Units, subcutaneous, TID with meals  ipratropium-albuteroL, 3 mL, nebulization, q6h while awake  lidocaine, 5 mL, infiltration, Once  midodrine, 15 mg, oral, TID with meals  nystatin, , Topical, BID  nystatin, , Topical, BID  pantoprazole, 40 mg, oral, Daily   Or  pantoprazole, 40 mg, intravenous, Daily  psyllium, 1 packet, oral, BID  sodium chloride, 3 mL, nebulization, TID  vancomycin, 125 mg, oral, BID  zinc oxide, 1 Application, Topical, BID       norepinephrine, 0.01-3 mcg/kg/min, Last Rate: 0.09 mcg/kg/min (02/06/24 1000)       PRN medications: acetaminophen, alteplase, benzocaine-menthoL, dextrose 10 % in water (D10W), dextrose, glucagon, ondansetron, oxygen, sennosides-docusate sodium     OBJECTIVE  PHYSICAL EXAM:   Heart Rate:  []   Temp:  [36.2 °C (97.2 °F)-36.7 °C (98.1 °F)]   Resp:  [12-22]   BP: ()/()   Weight:  [94.7 kg (208 lb 12.4 oz)-99 kg (218 lb 4.1 oz)]   SpO2:  [88 %-100 %]   Body mass index is 39.92 kg/m².  Chronically ill-appearing elderly white woman  Pale skin  Right-sided hand swelling  Left-sided finger amputation  Internal jugular tunneled hemodialysis catheter in place  There is no significant pretibial edema on both lower extremities  Soft abdomen  No Amos  No obvious joint deformities  Moist mucosa  Hearing seems to be intact  Phonation intact  Rectal tube in place       DATA:   Labs:  Results for orders placed or performed during the hospital encounter of 01/19/24 (from the past 96 hour(s))   POCT GLUCOSE   Result Value Ref Range    POCT Glucose 151 (H) 74 - 99 mg/dL   POCT GLUCOSE   Result Value Ref Range    POCT Glucose 158 (H) 74 - 99 mg/dL   POCT GLUCOSE   Result Value Ref Range    POCT  Glucose 149 (H) 74 - 99 mg/dL   Phosphorus   Result Value Ref Range    Phosphorus 2.2 (L) 2.5 - 4.5 mg/dL   CBC   Result Value Ref Range    WBC 14.2 (H) 4.4 - 11.3 x10*3/uL    nRBC 0.0 0.0 - 0.0 /100 WBCs    RBC 2.93 (L) 4.00 - 5.20 x10*6/uL    Hemoglobin 8.7 (L) 12.0 - 16.0 g/dL    Hematocrit 27.7 (L) 36.0 - 46.0 %    MCV 95 80 - 100 fL    MCH 29.7 26.0 - 34.0 pg    MCHC 31.4 (L) 32.0 - 36.0 g/dL    RDW 24.1 (H) 11.5 - 14.5 %    Platelets 211 150 - 450 x10*3/uL   Comprehensive metabolic panel   Result Value Ref Range    Glucose 186 (H) 65 - 99 mg/dL    Sodium 135 133 - 145 mmol/L    Potassium 4.0 3.4 - 5.1 mmol/L    Chloride 100 97 - 107 mmol/L    Bicarbonate 21 (L) 24 - 31 mmol/L    Urea Nitrogen 11 8 - 25 mg/dL    Creatinine 2.20 (H) 0.40 - 1.60 mg/dL    eGFR 23 (L) >60 mL/min/1.73m*2    Calcium 8.4 (L) 8.5 - 10.4 mg/dL    Albumin 2.7 (L) 3.5 - 5.0 g/dL    Alkaline Phosphatase 216 (H) 35 - 125 U/L    Total Protein 5.4 (L) 5.9 - 7.9 g/dL    AST 24 5 - 40 U/L    Bilirubin, Total 1.2 0.1 - 1.2 mg/dL    ALT 11 5 - 40 U/L    Anion Gap 14 <=19 mmol/L   Magnesium   Result Value Ref Range    Magnesium 1.80 1.60 - 3.10 mg/dL   POCT GLUCOSE   Result Value Ref Range    POCT Glucose 151 (H) 74 - 99 mg/dL   Staphylococcus Aureus/MRSA Colonization, Culture - PICC Only    Specimen: Anterior Nares; Swab   Result Value Ref Range    Staph/MRSA Screen Culture No Staphylococcus aureus isolated    POCT GLUCOSE   Result Value Ref Range    POCT Glucose 190 (H) 74 - 99 mg/dL   POCT GLUCOSE   Result Value Ref Range    POCT Glucose 152 (H) 74 - 99 mg/dL   CBC   Result Value Ref Range    WBC 14.1 (H) 4.4 - 11.3 x10*3/uL    nRBC 0.0 0.0 - 0.0 /100 WBCs    RBC 3.18 (L) 4.00 - 5.20 x10*6/uL    Hemoglobin 9.7 (L) 12.0 - 16.0 g/dL    Hematocrit 30.2 (L) 36.0 - 46.0 %    MCV 95 80 - 100 fL    MCH 30.5 26.0 - 34.0 pg    MCHC 32.1 32.0 - 36.0 g/dL    RDW 24.0 (H) 11.5 - 14.5 %    Platelets 243 150 - 450 x10*3/uL   Comprehensive metabolic panel    Result Value Ref Range    Glucose 164 (H) 65 - 99 mg/dL    Sodium 128 (L) 133 - 145 mmol/L    Potassium 4.3 3.4 - 5.1 mmol/L    Chloride 95 (L) 97 - 107 mmol/L    Bicarbonate 18 (L) 24 - 31 mmol/L    Urea Nitrogen 16 8 - 25 mg/dL    Creatinine 2.70 (H) 0.40 - 1.60 mg/dL    eGFR 18 (L) >60 mL/min/1.73m*2    Calcium 8.7 8.5 - 10.4 mg/dL    Albumin 2.7 (L) 3.5 - 5.0 g/dL    Alkaline Phosphatase 223 (H) 35 - 125 U/L    Total Protein 5.7 (L) 5.9 - 7.9 g/dL    AST 22 5 - 40 U/L    Bilirubin, Total 1.1 0.1 - 1.2 mg/dL    ALT 11 5 - 40 U/L    Anion Gap 15 <=19 mmol/L   Magnesium   Result Value Ref Range    Magnesium 1.90 1.60 - 3.10 mg/dL   POCT GLUCOSE   Result Value Ref Range    POCT Glucose 151 (H) 74 - 99 mg/dL   POCT GLUCOSE   Result Value Ref Range    POCT Glucose 166 (H) 74 - 99 mg/dL   POCT GLUCOSE   Result Value Ref Range    POCT Glucose 168 (H) 74 - 99 mg/dL   CBC   Result Value Ref Range    WBC 11.7 (H) 4.4 - 11.3 x10*3/uL    nRBC 0.0 0.0 - 0.0 /100 WBCs    RBC 3.13 (L) 4.00 - 5.20 x10*6/uL    Hemoglobin 9.5 (L) 12.0 - 16.0 g/dL    Hematocrit 31.4 (L) 36.0 - 46.0 %     80 - 100 fL    MCH 30.4 26.0 - 34.0 pg    MCHC 30.3 (L) 32.0 - 36.0 g/dL    RDW 24.1 (H) 11.5 - 14.5 %    Platelets 259 150 - 450 x10*3/uL   Comprehensive metabolic panel   Result Value Ref Range    Glucose 192 (H) 65 - 99 mg/dL    Sodium 128 (L) 133 - 145 mmol/L    Potassium 4.9 3.4 - 5.1 mmol/L    Chloride 97 97 - 107 mmol/L    Bicarbonate 16 (L) 24 - 31 mmol/L    Urea Nitrogen 21 8 - 25 mg/dL    Creatinine 3.40 (H) 0.40 - 1.60 mg/dL    eGFR 14 (L) >60 mL/min/1.73m*2    Calcium 8.5 8.5 - 10.4 mg/dL    Albumin 2.4 (L) 3.5 - 5.0 g/dL    Alkaline Phosphatase 234 (H) 35 - 125 U/L    Total Protein 5.6 (L) 5.9 - 7.9 g/dL    AST 29 5 - 40 U/L    Bilirubin, Total 0.8 0.1 - 1.2 mg/dL    ALT 12 5 - 40 U/L    Anion Gap 15 <=19 mmol/L   Magnesium   Result Value Ref Range    Magnesium 2.10 1.60 - 3.10 mg/dL   POCT GLUCOSE   Result Value Ref Range     POCT Glucose 178 (H) 74 - 99 mg/dL   POCT GLUCOSE   Result Value Ref Range    POCT Glucose 196 (H) 74 - 99 mg/dL   POCT GLUCOSE   Result Value Ref Range    POCT Glucose 132 (H) 74 - 99 mg/dL   POCT GLUCOSE   Result Value Ref Range    POCT Glucose 187 (H) 74 - 99 mg/dL   CBC   Result Value Ref Range    WBC 11.1 4.4 - 11.3 x10*3/uL    nRBC 0.0 0.0 - 0.0 /100 WBCs    RBC 2.91 (L) 4.00 - 5.20 x10*6/uL    Hemoglobin 8.8 (L) 12.0 - 16.0 g/dL    Hematocrit 26.4 (L) 36.0 - 46.0 %    MCV 91 80 - 100 fL    MCH 30.2 26.0 - 34.0 pg    MCHC 33.3 32.0 - 36.0 g/dL    RDW 22.7 (H) 11.5 - 14.5 %    Platelets 169 150 - 450 x10*3/uL   Comprehensive metabolic panel   Result Value Ref Range    Glucose 188 (H) 65 - 99 mg/dL    Sodium 133 133 - 145 mmol/L    Potassium 4.1 3.4 - 5.1 mmol/L    Chloride 99 97 - 107 mmol/L    Bicarbonate 21 (L) 24 - 31 mmol/L    Urea Nitrogen 13 8 - 25 mg/dL    Creatinine 2.40 (H) 0.40 - 1.60 mg/dL    eGFR 21 (L) >60 mL/min/1.73m*2    Calcium 8.5 8.5 - 10.4 mg/dL    Albumin 2.5 (L) 3.5 - 5.0 g/dL    Alkaline Phosphatase 353 (H) 35 - 125 U/L    Total Protein 5.7 (L) 5.9 - 7.9 g/dL    AST 66 (H) 5 - 40 U/L    Bilirubin, Total 0.9 0.1 - 1.2 mg/dL    ALT 20 5 - 40 U/L    Anion Gap 13 <=19 mmol/L   Magnesium   Result Value Ref Range    Magnesium 1.70 1.60 - 3.10 mg/dL   POCT GLUCOSE   Result Value Ref Range    POCT Glucose 162 (H) 74 - 99 mg/dL         SIGNATURE: Saeed Mondragon MD PATIENT NAME: Ayanna Gross   DATE: February 6, 2024 MRN: [id number]   TIME: 10:18 AM PAGER: [phone]

## 2024-02-06 NOTE — NURSING NOTE
Rt arm single lumen midline catheter removed intact, catheter 20cm. No redness, drainage or swelling noted at site, gauze applied.

## 2024-02-06 NOTE — PROGRESS NOTES
Ayanna Gross is a 73 y.o. female on day 18 of admission presenting with Septic shock (CMS/HCC).    Subjective   Interval History:   Patient seen and examined   present  Remains on pressors  Awake, alert    Review of Systems   All other systems reviewed and are negative.      Objective   Range of Vitals (last 24 hours)  Heart Rate:  []   Temp:  [36 °C (96.8 °F)-36.7 °C (98.1 °F)]   Resp:  [12-23]   BP: ()/()   Weight:  [99 kg (218 lb 4.1 oz)]   SpO2:  [59 %-100 %]   Daily Weight  02/06/24 : 99 kg (218 lb 4.1 oz)    Body mass index is 39.92 kg/m².    Physical Exam  Constitutional:       Appearance: Normal appearance.   HENT:      Head: Normocephalic and atraumatic.      Nose: Nose normal.   Eyes:      Extraocular Movements: Extraocular movements intact.      Conjunctiva/sclera: Conjunctivae normal.   Cardiovascular:      Heart sounds: Normal heart sounds, S1 normal and S2 normal.   Pulmonary:      Breath sounds: Decreased breath sounds present.   Abdominal:      General: Bowel sounds are normal.      Palpations: Abdomen is soft.   Musculoskeletal:      Cervical back: Normal range of motion and neck supple.   Skin:     Comments: Stage III sacral ulcer, bilateral posterior heel eschar -photos examined  Neurological:      Mental Status: She is alert.        Antibiotics  aspirin tablet 325 mg  acetaminophen (Tylenol) tablet 650 mg  cefepime (Maxipime) 1 g in dextrose 5 % 50 mL IV  sodium chloride 0.9 % bolus 2,229 mL  apixaban (Eliquis) tablet 2.5 mg  escitalopram (Lexapro) tablet 10 mg  febuxostat (Uloric) tablet 40 mg  fenofibrate (Triglide) tablet 160 mg  gabapentin (Neurontin) capsule 100 mg  midodrine (Proamatine) tablet 15 mg  nystatin (Mycostatin) 100,000 unit/gram powder  oxyCODONE-acetaminophen (Percocet) 5-325 mg per tablet 1 tablet  simvastatin (Zocor) tablet 20 mg  piperacillin-tazobactam-dextrose (Zosyn) IV 2.25 g  vancomycin (Vancocin) capsule 125 mg  oxygen (O2) therapy  dextrose  50 % injection 25 g  glucagon (Glucagen) injection 1 mg  dextrose 10 % in water (D10W) infusion  insulin lispro (HumaLOG) injection 0-10 Units  nystatin (Mycostatin) 100,000 unit/gram powder 1 Application  vancomycin-diluent combo no.1 (Xellia) IVPB 1,750 mg  piperacillin-tazobactam-dextrose (Zosyn) IV 2.25 g  sodium chloride 0.9 % bolus 500 mL  norepinephrine (Levophed) 8 mg in dextrose 5% 250 mL (0.032 mg/mL) infusion (premix)  vancomycin (Vancocin) placeholder  pantoprazole (ProtoNix) EC tablet 40 mg  pantoprazole (ProtoNix) injection 40 mg  sennosides-docusate sodium (Judy-Colace) 8.6-50 mg per tablet 1 tablet  doxycycline (Vibramycin) in dextrose 5 % in water (D5W) 100 mL  mg  LORazepam (Ativan) injection 2 mg  magnesium sulfate IV 2 g  zinc oxide 20 % ointment 1 Application  nystatin (Mycostatin) cream  norepinephrine (Levophed) 8 mg in dextrose 5% 250 mL (0.032 mg/mL) infusion (premix)  heparin 1,000 unit/mL injection 2,000 Units  heparin 1,000 unit/mL injection 2,000 Units  nystatin (Mycostatin) ointment  acetaminophen (Tylenol) oral liquid 1,000 mg  albumin human 25 % solution 12.5 g  perflutren lipid microspheres (Definity) injection 0.5-10 mL of dilution  sulfur hexafluoride microsphr (Lumason) injection 24.28 mg  perflutren protein A microsphere (Optison) injection 0.5 mL  ipratropium-albuteroL (Duo-Neb) 0.5-2.5 mg/3 mL nebulizer solution 3 mL  sodium chloride 3 % nebulizer solution 3 mL  vancomycin-diluent combo no.1 (Xellia) IVPB 750 mg  sennosides-docusate sodium (Judy-Colace) 8.6-50 mg per tablet 1 tablet  acetaminophen (Tylenol) tablet 650 mg  doxycycline (Vibramycin) capsule 100 mg  magnesium oxide (Mag-Ox) tablet 400 mg  vasopressin (Vasostrict) 0.2 unit/mL infusion  norepinephrine (Levophed) 8 mg in dextrose 5% 250 mL (0.032 mg/mL) infusion (premix)  heparin 1,000 unit/mL injection 2,000 Units  heparin 1,000 unit/mL injection 2,000 Units  albumin human 25 % solution 12.5 g  vancomycin  (Xellia) 1 g in 200 mL (Xellia) IVPB 1 g  ipratropium-albuteroL (Duo-Neb) 0.5-2.5 mg/3 mL nebulizer solution 3 mL  sodium chloride 3 % nebulizer solution 3 mL  albumin human 5 % infusion 12.5 g  heparin 1,000 unit/mL injection 2,000 Units  heparin 1,000 unit/mL injection 2,000 Units  vancomycin (Vancocin) capsule 125 mg  albumin human 25 % solution 12.5 g  heparin 1,000 unit/mL injection 2,000 Units  heparin 1,000 unit/mL injection 2,000 Units  ipratropium-albuteroL (Duo-Neb) 0.5-2.5 mg/3 mL nebulizer solution 3 mL  lidocaine (Xylocaine) 10 mg/mL (1 %) injection 50 mg  sodium phosphate 15 mmol in sodium chloride 0.9% 250 mL IV  sod phos di, mono-K phos mono (K Phos Neutral) tablet 250 mg  potassium, sodium phosphates (Phos-NaK) 280-160-250 mg packet 1 packet  doxycycline (Vibramycin) capsule 100 mg  fludrocortisone (Florinef) tablet 0.1 mg  gabapentin (Neurontin) capsule 100 mg  acetaminophen (Tylenol) tablet 1,000 mg  benzocaine-menthoL (Dermoplast) topical spray  vancomycin (Vancocin) capsule 125 mg  heparin 1,000 unit/mL injection 2,000 Units  heparin 1,000 unit/mL injection 2,000 Units  magnesium sulfate IV 2 g  albumin human 25 % solution 12.5 g  promethazine (Phenergan) injection 12.5 mg  ondansetron (Zofran) injection 4 mg  lidocaine (Xylocaine) 10 mg/mL (1 %) injection 50 mg  alteplase (Cathflo Activase) injection 2 mg  acetaminophen (Tylenol) tablet 650 mg  psyllium (Metamucil) 3.4 gram packet 1 packet  perflutren lipid microspheres (Definity) injection 0.5-10 mL of dilution  sulfur hexafluoride microsphr (Lumason) injection 24.28 mg  perflutren protein A microsphere (Optison) injection 0.5 mL  epoetin di-epbx (Retacrit) injection 10,000 Units  sulfur hexafluoride microsphr (Lumason) injection 24.28 mg  perflutren lipid microspheres (Definity) injection 0.5-10 mL of dilution      Relevant Results  Labs  Results from last 72 hours   Lab Units 02/06/24  0454 02/05/24  0506 02/04/24  0508   WBC AUTO x10*3/uL  11.1 11.7* 14.1*   HEMOGLOBIN g/dL 8.8* 9.5* 9.7*   HEMATOCRIT % 26.4* 31.4* 30.2*   PLATELETS AUTO x10*3/uL 169 259 243     Results from last 72 hours   Lab Units 02/06/24  0454 02/05/24  0506 02/04/24  0508   SODIUM mmol/L 133 128* 128*   POTASSIUM mmol/L 4.1 4.9 4.3   CHLORIDE mmol/L 99 97 95*   CO2 mmol/L 21* 16* 18*   BUN mg/dL 13 21 16   CREATININE mg/dL 2.40* 3.40* 2.70*   GLUCOSE mg/dL 188* 192* 164*   CALCIUM mg/dL 8.5 8.5 8.7   ANION GAP mmol/L 13 15 15   EGFR mL/min/1.73m*2 21* 14* 18*     Results from last 72 hours   Lab Units 02/06/24  0454 02/05/24  0506 02/04/24  0508   ALK PHOS U/L 353* 234* 223*   BILIRUBIN TOTAL mg/dL 0.9 0.8 1.1   PROTEIN TOTAL g/dL 5.7* 5.6* 5.7*   ALT U/L 20 12 11   AST U/L 66* 29 22   ALBUMIN g/dL 2.5* 2.4* 2.7*     Estimated Creatinine Clearance: 23.7 mL/min (A) (by C-G formula based on SCr of 2.4 mg/dL (H)).  C-Reactive Protein   Date Value Ref Range Status   12/25/2023 5.20 (H) 0.00 - 2.00 mg/dL Final     CRP   Date Value Ref Range Status   06/23/2023 19.9 (H) 0 - 2.0 MG/DL Final     Comment:     Performed at [address] [address]   05/22/2022 1.0 0 - 2.0 MG/DL Final     Comment:     Performed at [address] [address]     Microbiology  Reviewed  Imaging  Upper extremity venous duplex bilateral    Result Date: 1/25/2024           [facility] [id number] [place] [address]            Phone [phone]  Vascular Lab Report  [facility] US UPPER EXTREMITY VENOUS DUPLEX BILATERAL Patient Name:      BASIATRAVIS Gray Physician:  [id number] Mini Busby MD, RPVI Study Date:        1/25/2024           Ordering Provider:  [id number] ERI BERMAN MRN/PID:           [id number]            Fellow: Accession#:        [id number]        Technologist:       Dara Parr RVT Date of Birth/Age: 1950 / 73 years Technologist 2: Gender:            F                    Encounter#:         [id number] Admission Status:  Inpatient           Location Performed: [facility]  Diagnosis/ICD: Right arm swelling-M79.89; Left arm swelling-M79.89 CPT Codes:     14665 Peripheral venous duplex scan for DVT complete  CONCLUSIONS:  Right Upper Venous: No evidence of acute deep vein thrombus visualized in the right upper extremity. Internal jugular vein was visualized in segments due to IV lines and bandages. Left Upper Venous: No evidence of acute deep vein thrombus visualized in the left upper extremity. Subclavian stent is noted and appears patent. There is a known occluded dialysis access noted.  Additional Findings: Technically difficult exam due to IV lines, bandages, and patient's positioning.  Imaging & Doppler Findings:  Right               Compressible Thrombus        Flow Internal Jugular        Yes        None   Spontaneous/Phasic Subclavian              Yes        None Subclavian Proximal     Yes        None   Spontaneous/Phasic Subclavian Mid          Yes        None Subclavian Distal       Yes        None   Spontaneous/Phasic Axillary                Yes        None       Pulsatile Brachial                Yes        None Cephalic                Yes        None Basilic                 Yes        None  Left                Compress Thrombus        Flow Internal Jugular      Yes      None       Pulsatile Subclavian            Yes      None Subclavian Proximal   Yes      None       Pulsatile Subclavian Mid        Yes      None       Pulsatile Subclavian Distal     Yes      None       Pulsatile Axillary              Yes      None   Spontaneous/Phasic Brachial              Yes      None Cephalic              Yes      None Basilic               Yes      None  [id number] Mini Busby MD, RPVI Electronically signed by [id number] Mini Busby MD, ALICE on 1/25/2024 at 4:06:13 PM  ** Final **     ECG 12 lead    Result Date: 1/24/2024  Sinus tachycardia  with 1st degree AV block Right  bundle branch block Possible Lateral infarct , age undetermined Abnormal ECG Confirmed by Yesika Nj ([id number]) on 1/24/2024 6:54:45 AM    XR tibia fibula right 2 views    Result Date: 1/22/2024  Interpreted By:  Real Mendieta, STUDY: XR TIBIA FIBULA RIGHT 2 VIEWS; 1/22/2024 2:15 pm   INDICATION: Signs/Symptoms:evaluate source of infection, osteomyelitis;   COMPARISON: None available.   ACCESSION NUMBER(S): [id number]   ORDERING CLINICIAN: BAN GIFFORD   TECHNIQUE: Views: AP and Lateral of the right tibia and fibula   FINDINGS: RESULT: There is no evidence for fracture or dislocation. Tricompartmental degenerative changes of the right knee. The tibia and fibula appear intact without bony erosion or evidence for osteomyelitis. No other bony or soft tissue abnormality is identified. No subcutaneous gas is noted.       No evidence for acute osseous abnormality. No bony erosion to suggest osteomyelitis.   Signed by: Real Mendieta 1/22/2024 3:15 PM Dictation workstation:   [id number]    XR ankle right 3+ views    Result Date: 1/22/2024  Interpreted By:  Real Mendieta, STUDY: XR ANKLE RIGHT 3+ VIEWS; 1/22/2024 2:15 pm   INDICATION: Signs/Symptoms:Evuluate source of infection, osteomyelitis;   COMPARISON: None available.   ACCESSION NUMBER(S): [id number]   ORDERING CLINICIAN: BAN GIFFORD   TECHNIQUE: Views: AP, Lateral, Oblique, right ankle   FINDINGS: RESULT: There is no evidence for fracture or dislocation. The ankle mortise is intact. Joint spaces appear adequately maintained. No bony erosion to suggest osteomyelitis. No bone lesion or soft tissue abnormality is identified. No subcutaneous gas is seen.       No evidence for acute osseous abnormality. No bony erosion to suggest osteomyelitis.   Signed by: Real Mendieta 1/22/2024 3:14 PM Dictation workstation:   [id number]    Transthoracic Echo (TTE) Complete    Result Date: 1/22/2024           [address]  [place] [address]            Phone [phone] TRANSTHORACIC ECHOCARDIOGRAM REPORT  Patient Name:      BASIA VEGA      Reading Physician:   [id number] Fausto Davies campussa DENNIS Study Date:        1/22/2024           Ordering Provider:   [id number] ERI PINO                                                             CULLEN MRN/PID:           [id number]            Fellow: Accession#:        [id number]        Nurse: Date of Birth/Age: 1950 / 73 years Sonographer:         Tabatha Page RDCS Gender:            F                   Additional Staff: Height:            157.00 cm           Admit Date: Weight:            75.00 kg            Admission Status:    Inpatient - Routine BSA:               1.76 m2             Department Location: [facility] Blood Pressure: 88 /49 mmHg Study Type:    TRANSTHORACIC ECHO (TTE) COMPLETE Diagnosis/ICD: Chronic combined systolic (congestive) and diastolic (congestive)                heart failure (CHF)-I50.42; Sepsis, unspecified organism-A41.9 Indication:    heart failure,septic shock CPT Codes:     Echo Complete w Full Doppler-98693 Patient History: BMI:               Obese >30 Pertinent History: Sepsis, PVD, A-Fib, CVA, Cancer and HTN. breast                    prosthesis/ca, ESRD,anemia,septic shock,heart failure. Study Detail: The following Echo studies were performed: 2D, M-Mode, Doppler and               color flow. Technically challenging study due to prosthesis,               prominent lung artifact and body habitus.  PHYSICIAN INTERPRETATION: Left Ventricle: Left ventricular systolic function is normal, with an estimated ejection fraction of 70%. There are no regional wall motion abnormalities. The left ventricular cavity size is normal. Left ventricular diastolic filling was indeterminate. Left Atrium: The left atrium is moderately dilated. Right Ventricle: The right ventricle is normal in size. There is normal right ventricular global systolic function. Right Atrium: The right atrium is normal in  size. Aortic Valve: The aortic valve is trileaflet. There is no evidence of aortic valve regurgitation. The peak instantaneous gradient of the aortic valve is 2.8 mmHg. Mitral Valve: The mitral valve is normal in structure. There is no evidence of mitral valve regurgitation. Tricuspid Valve: The tricuspid valve is structurally normal. There is trace tricuspid regurgitation. Pulmonic Valve: The pulmonic valve is not well visualized. The pulmonic valve regurgitation was not well visualized. Pericardium: There is no pericardial effusion noted. Aorta: The aortic root is normal.  CONCLUSIONS:  1. Left ventricular systolic function is normal with a 70% estimated ejection fraction.  2. The left atrium is moderately dilated.  3. Left ventricular diastolic filling indeterminate. QUANTITATIVE DATA SUMMARY: 2D MEASUREMENTS:                          Normal Ranges: LAs:           4.50 cm   (2.7-4.0cm) IVSd:          0.61 cm   (0.6-1.1cm) LVPWd:         0.88 cm   (0.6-1.1cm) LVIDd:         3.66 cm   (3.9-5.9cm) LV Mass Index: 41.9 g/m2 LV SYSTOLIC FUNCTION BY 2D PLANIMETRY (MOD):                     Normal Ranges: EF-A4C View: 68.3 % (>=55%) LV DIASTOLIC FUNCTION:                     Normal Ranges: MV Peak E: 1.19 m/s (0.7-1.2 m/s) MV Peak A: 0.90 m/s (0.42-0.7 m/s) E/A Ratio: 1.32     (1.0-2.2) MITRAL VALVE:                 Normal Ranges: MV DT: 192 msec (150-240msec) AORTIC VALVE:                         Normal Ranges: AoV Vmax:      0.84 m/s (<=1.7m/s) AoV Peak P.8 mmHg (<20mmHg) LVOT Max Shimon:  0.47 m/s (<=1.1m/s) LVOT Diameter: 1.90 cm  (1.8-2.4cm) AoV Area,Vmax: 1.57 cm2 (2.5-4.5cm2) TRICUSPID VALVE/RVSP:                   Normal Ranges: IVC Diam: 1.05 cm  [id number] [facility] DENNIS Electronically signed on 2024 at 2:53:04 PM  ** Final **     XR foot left 3+ views    Result Date: 2024  Interpreted By:  Real Mendieta, STUDY: XR FOOT LEFT 1-2 VIEWS; 2024 4:15 pm   INDICATION: Signs/Symptoms:osteomyelitis.  Pain   COMPARISON: 12/07/2023   ACCESSION NUMBER(S): [id number]   ORDERING CLINICIAN: BAN GIFFORD   TECHNIQUE: Views:  AP, Lat, Oblique, left foot   FINDINGS: RESULT: There is no evidence for fracture or dislocation. 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 Mild hammertoe deformities. Joint spaces appear adequately maintained. Soft tissue ulceration of the posterior aspect of the heel however no evidence for bony erosion to suggest osteomyelitis.       No evidence for acute fracture or acute bony erosion to suggest osteomyelitis. Chronic changes as described.   Signed by: Real Mendieta 1/21/2024 7:15 PM Dictation workstation:   [id number]    CT chest abdomen pelvis wo IV contrast    Result Date: 1/21/2024  Interpreted By:  Maria Garcia, STUDY: CT CHEST ABDOMEN PELVIS WO CONTRAST;  1/20/2024 11:42 pm   INDICATION: Mental status change. Elevated white blood cell count with infectious workup.   COMPARISON: 08/20/2023   ACCESSION NUMBER(S): [id number]   ORDERING CLINICIAN: BAN GIFFORD   TECHNIQUE: CT of the chest, abdomen and pelvis was performed with no oral or intravenous contrast administered. Sagittal and coronal reformations were completed by the technologist at the acquisition scanner.   All CT examinations are performed with 1 or more of the following dose reduction techniques: Automated exposure control, adjustment of mA and/or kv according to patient's size, or use of iterative reconstruction techniques.   FINDINGS: Please note that the study is limited without intravenous contrast.   CHEST: Bilateral pleural effusions are present with right effusion moderately small in size and with the left effusion moderately small in size as well. There is shift of the heart and mediastinum to the left of midline due to atelectasis of the left upper lobe. There is consolidation throughout left lower lobe with air bronchograms noted. On the right, there is compressive atelectasis seen  posteriorly in the right lower lobe.   There is mild reactive mediastinal adenopathy seen at this time with mediastinal lymph nodes increased in size since prior study. Several of these mediastinal nodes are at the upper limits of normal measuring 8 mm in short axis diameter.   No pericardial effusion is present. The cardiac size is normal with mitral annulus calcification.   There has been prior bilateral mastectomy with reconstruction of the left breast with implant placement. Surgical clips are visible within the left axilla. Stent grafts are visible within the left upper extremity and within the left innominate, subclavian as well as axillary veins.   A peripherally calcified 1.8 cm nodule arises from the lower pole of left thyroid lobe.   ABDOMEN:   LIVER: Unremarkable.   BILE DUCTS: No intrahepatic or extrahepatic biliary ductal dilatation is seen.   GALLBLADDER: Cholelithiasis is observed with some calcification of the gallbladder wall demonstrated as well. No gallbladder wall thickening or pericholecystic fluid is evident.   PANCREAS: Unremarkable   SPLEEN: Unremarkable   ADRENAL GLANDS: Unremarkable   KIDNEYS AND URETERS: Kidneys are small in size with extensive renal artery calcification demonstrated.   PELVIS:   BLADDER: Amos catheter is present within the decompressed urinary bladder.   REPRODUCTIVE ORGANS: Calcification of the arcuate arteries within the uterus is seen. There is no uterine enlargement or adnexal mass.   BOWEL: A rectal balloon is seen. There is no abnormal distention of the intestinal tract.   VESSELS: There is atherosclerosis of the thoracoabdominal aorta and iliac arteries with calcification in the walls of the celiac, splenic, hepatic, and mesenteric arteries.   PERITONEUM/RETROPERITONEUM/LYMPH NODES: There is a minimal amount of ascites seen about the liver and spleen with mild presacral edema noted.   ABDOMINAL WALL: There is anasarca involving the soft tissues of the abdomen and  pelvis. Postoperative change from ventral hernia repair is seen.   BONES: Bilateral sacroiliitis is seen. There is lumbar dextroscoliosis. Chronic rotator cuff tear is present bilaterally with osteoarthritis of both shoulders, right greater than left.       Chest 1.  Moderately small bilateral pleural effusions with left upper lobe atelectasis and compressive atelectasis seen posteriorly in right lower lobe. A left lower lobe pneumonia is identified. There is extensive airspace consolidation within left lower lobe with air bronchograms observed. Mild mediastinal reactive adenopathy is present as well.   Abdomen-Pelvis 1.  Cholelithiasis without evidence of cholecystitis. 2. Small amount of ascites with mild presacral edema and anasarca within the soft tissues of the abdominal wall. 3. Renal atrophy with extensive vascular calcifications.     MACRO: None   Signed by: Maria Garcia 1/21/2024 8:30 AM Dictation workstation:   [id number]    CT head wo IV contrast    Result Date: 1/21/2024  Interpreted By:  Maria Garcia, STUDY: CT HEAD WO IV CONTRAST 1/20/2024 11:42 pm   INDICATION: Signs/Symptoms:mental status changes   COMPARISON: 12/11/2023   ACCESSION NUMBER(S): [id number]   ORDERING CLINICIAN: BAN GIFFORD   TECHNIQUE: Unenhanced axial images of the brain are completed.   All CT examinations are performed with 1 or more of the following dose reduction techniques: Automated exposure control, adjustment of mA and/or kv according to patient's size, or use of iterative reconstruction techniques.   FINDINGS: Helical unenhanced axial images of the brain demonstrate a mild to moderate degree of ventricular enlargement with proportionate widening of the sulci and sylvian fissures. There is no midline shift, mass effect, extra-axial fluid collection, or acute intracranial hemorrhage. There is diminished density seen in the periventricular white matter indicating chronic microvascular ischemic disease. There is calcified plaque  seen within the distal vertebral and internal carotid arteries bilaterally. No calvarial abnormality is seen.       Atrophy and chronic microvascular ischemic disease without acute intracranial process.   Signed by: Maria Garcia 1/21/2024 8:09 AM Dictation workstation:   [id number]    XR chest 1 view    Result Date: 1/20/2024  Interpreted By:  Brendon Perez, STUDY: XR CHEST 1 VIEW; 1/20/2024 5:03 pm   INDICATION: CLINICAL INFORMATION: Signs/Symptoms:Insertion right IJ central line, also left thoracentesis.   COMPARISON: 01/19/2024 at 1338 hours   ACCESSION NUMBER(S): [id number]   ORDERING CLINICIAN: BOZENA ANTONIO   TECHNIQUE: Portable chest one view.   FINDINGS: The cardiac size is indeterminate in view of the AP projection. There is no change in the right-sided dual port central venous catheter. There is a new right internal jugular venous catheter present with the tip at approximately same level as the dual port central venous catheter, overlying the mid to lower right atrium. There is no evidence for pneumothorax. Patient has a history of left thoracentesis without evidence for pneumothorax on the left. There is bilateral basilar alveolar infiltrate and effusions. Left effusion is decreased compared to the study from 1 day earlier.   Surgical clips are identified within left chest wall. Endovascular stent is identified in the distribution of the left subclavian artery.       1. Status post right-sided central venous catheter placement as described above with the tip overlying the mid to caudal aspect of the right atrium. There is no evidence for pneumothorax. 2. No evidence for left-sided pneumothorax after left-sided thoracentesis. Decrease in the left effusion. 3. Bilateral basilar infiltrates and effusions are present. Follow-up to assure complete clearing is suggested.   MACRO: none   Signed by: Brendon Perez 1/20/2024 5:11 PM Dictation workstation:   [id number]    XR chest 1 view    Result Date:  1/19/2024  Interpreted By:  Real Mendieta, STUDY: XR CHEST 1 VIEW; 1/19/2024 1:38 pm   INDICATION: Signs/Symptoms:dyspnea.   COMPARISON: 12/26/2023   ACCESSION NUMBER(S): [id number]   ORDERING CLINICIAN: EMELY GOLDSMITH   TECHNIQUE: 1 view of the chest was performed.   FINDINGS: There may be a minimal right pleural effusion. Slight prominence of the interstitium otherwise the right lung is clear. Improved appearance of the left lung with improved aeration of the right upper lobe. There is opacification of the left lower lobe possibly due to large pleural effusion which is similar to the prior study. No pneumothorax. Right-sided dual lumen central line catheter with tip at the proximal atrium. The cardiomediastinal silhouette is within normal limits. Left-sided subclavian stents are again noted.       Improved aeration and appearance of the left lung superiorly however there is a persistent large left pleural effusion and opacification of the left lower lobe.   Signed by: Real Mendieta 1/19/2024 1:44 PM Dictation workstation:   [id number]     Assessment/Plan   Septic shock-on pressors-resolving  Left-sided pleural effusion   Left lower lobe pneumonia-treated  Proteus urinary tract infection-treated  History of MRSA endocarditis  History of C. 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

## 2024-02-06 NOTE — CONSULTS
Inpatient consult to Cardiology  Consult performed by: Herminio Lemus DO  Consult ordered by: Minh Shane MD  Reason for consult: Diastolic heart failure, hypotension        History Of Present Illness:    Ayanna Gross is a 73 y.o. female presenting with hypotension.  She has a history of insulin-dependent diabetes mellitus type 2, end-stage renal disease on hemodialysis currently through a right chest wall tunneled catheter.  She has been hospitalized several times over the last 6 months.  She was admitted in August with hypotension and sepsis, treated conservatively for infectious endocarditis after a vegetation was identified on the posterior mitral valve leaflet.  She completed a prolonged course of IV antibiotics.  She was admitted in December with bilateral lower extremity heel wounds thought to have been secondary to her known peripheral arterial disease.  His hospital stay was complicated by hypotension requiring midodrine and stress dose steroids.  Vascular surgery saw the patient and there were discussions at that time regarding goals of care.  Decision was made not to pursue lower extremity angiogram with possible intervention, there were talks at that time of palliation.  She has an unstageable sacral wound, she has a history of C. difficile colitis on chronic suppression.  She was admitted last month with septic shock and has been treated with broad-spectrum antibiotics.  She has had several echocardiograms over the last 3 months with the most recent being on January 22 showing normal left ventricular size and function, significant left atrial enlargement in the absence of significant mitral regurgitation which likely represents diastolic dysfunction.  We have been asked to see her because of her persistent hypotension.     Last Recorded Vitals:  Vitals:    02/06/24 0900 02/06/24 0915 02/06/24 1000 02/06/24 1141   BP: (!) 116/105 (!) 82/36 (!) 74/41    BP Location:       Patient Position:        Pulse: (!) 112 109 106    Resp: 22 17 21    Temp:       TempSrc:       SpO2: 90% 94% 92% (!) 59%   Weight:       Height:           Last Labs:  CBC - 2/6/2024:  4:54 AM  11.1 8.8 169    26.4      CMP - 2/6/2024:  4:54 AM  8.5 5.7 66 --- 0.9   2.2 2.5 20 353      PTT - 8/26/2023:  2:45 PM  1.3   13.9 94.8     TR HGBA1C (Data Conversion)   Date/Time Value Ref Range Status   09/15/2022 03:37 PM 8.1 (H) 4.2 - 6.5 % Final     Hemoglobin A1C   Date/Time Value Ref Range Status   06/25/2023 05:27 AM 8.8 (H) 4.0 - 6.0 % Final     Comment:     Hemoglobin A1C levels are related to mean blood glucose during the   preceding 2-3 months. The relationship table below may be used as a   general guide. Each 1% increase in HGB A1C is a reflection of an   increase in mean glucose of approximately 30 mg/dl.   Reference: Diabetes Care, volume 29, supplement 1 Jan. 2006                        HGB A1C ................. Approx. Mean Glucose   _______________________________________________   6%   ...............................  120 mg/dl   7%   ...............................  150 mg/dl   8%   ...............................  180 mg/dl   9%   ...............................  210 mg/dl   10%  ...............................  240 mg/dl  Performed at [address] [facility] [id number]     08/06/2020 04:06 AM 6.3 (H) 4.0 - 6.0 % Final     Comment:     Hemoglobin A1C levels are related to mean blood glucose during the   preceding 2-3 months. The relationship table below may be used as a   general guide. Each 1% increase in HGB A1C is a reflection of an   increase in mean glucose of approximately 30 mg/dl.   Reference: Diabetes Care, volume 29, supplement 1 Jan. 2006                        HGB A1C ................. Approx. Mean Glucose   _______________________________________________   6%   ...............................  120 mg/dl   7%   ...............................  150 mg/dl   8%   ...............................  180  "mg/dl   9%   ...............................  210 mg/dl   10%  ...............................  240 mg/dl  Performed at [address] [facility] [id number]       LDL Calculated   Date/Time Value Ref Range Status   07/01/2023 06:04 AM 43 (L) 65 - 130 MG/DL Final   12/29/2018 04:15 AM 95 65 - 130 MG/DL Final      Last I/O:  I/O last 3 completed shifts:  In: 1137.1 (12.2 mL/kg) [P.O.:600; I.V.:537.1 (5.8 mL/kg)]  Out: 2300 (24.7 mL/kg) [Other:1500; Stool:800]  Dosing Weight: 93 kg     Past Cardiology Tests (Last 3 Years):  EKG:  ECG 12 lead 01/19/2024      Electrocardiogram, 12-lead PRN ACS symptoms 01/05/2024      ECG 12 Lead 12/06/2023    Echo:  Transthoracic Echo (TTE) Complete 01/22/2024    Ejection Fractions:  No results found for: \"EF\"  Cath:  No results found for this or any previous visit from the past 1095 days.    Stress Test:  No results found for this or any previous visit from the past 1095 days.    Cardiac Imaging:  No results found for this or any previous visit from the past 1095 days.      Past Medical History:  She has a past medical history of Asthma, CAD (coronary artery disease), CHF (congestive heart failure) (CMS/[facility]), Chronic kidney disease on chronic dialysis (CMS/[facility]), Hypertension, Personal history of diseases of the blood and blood-forming organs and certain disorders involving the immune mechanism, Sleep apnea, and Type 2 diabetes mellitus without complications (CMS/[facility]) (09/15/2022).    Past Surgical History:  She has a past surgical history that includes Other surgical history (11/18/2013); Carpal tunnel release (11/18/2013); Umbilical hernia repair (11/18/2013); Tonsillectomy (11/18/2013); Hand surgery (11/18/2013); Other surgical history (11/18/2013); Other surgical history (11/18/2013); Other surgical history (04/04/2022); Other surgical history (04/04/2022); Other surgical history (04/04/2022); Mastectomy, partial (04/10/2014); [place] lymph node biopsy (04/10/2014); US " guided percutaneous placement (6/29/2023); and MR angio head wo IV contrast (8/29/2023).      Social History:  She reports that she has never smoked. She has never used smokeless tobacco. She reports that she does not currently use alcohol. She reports that she does not use drugs.    Family History:  Family History   Problem Relation Name Age of Onset    Lymphoma Mother      Prostate cancer Father          passed away fabiana min 2017        Allergies:  Patient has no known allergies.    Inpatient Medications:  Scheduled medications   Medication Dose Route Frequency    [Held by provider] apixaban  2.5 mg oral BID    doxycylcine  100 mg oral Daily    [START ON 2/7/2024] epoetin di or biosimilar  10,000 Units intravenous Every Mon/Wed/Fri    escitalopram  10 mg oral Nightly    gabapentin  100 mg oral TID    heparin  2,000 Units intra-catheter After Dialysis    heparin  2,000 Units intra-catheter After Dialysis    heparin  2,000 Units intra-catheter After Dialysis    heparin  2,000 Units intra-catheter After Dialysis    insulin lispro  0-10 Units subcutaneous TID with meals    ipratropium-albuteroL  3 mL nebulization q6h while awake    lidocaine  5 mL infiltration Once    midodrine  15 mg oral TID with meals    nystatin   Topical BID    nystatin   Topical BID    pantoprazole  40 mg oral Daily    Or    pantoprazole  40 mg intravenous Daily    psyllium  1 packet oral BID    sodium chloride  3 mL nebulization TID    vancomycin  125 mg oral BID    zinc oxide  1 Application Topical BID     PRN medications   Medication    acetaminophen    alteplase    benzocaine-menthoL    dextrose 10 % in water (D10W)    dextrose    glucagon    ondansetron    oxygen    sennosides-docusate sodium     Continuous Medications   Medication Dose Last Rate    norepinephrine  0.01-3 mcg/kg/min 0.09 mcg/kg/min (02/06/24 1000)     Outpatient Medications:  Current Outpatient Medications   Medication Instructions    apixaban (Eliquis) 2.5 mg tablet  1 tablet, oral, 2 times daily    blood sugar diagnostic strip  1 x daily e11.65 for 90 days    blood-glucose sensor (Dexcom G6 Sensor) device Check blood sugar when needed and as instructed    doxycycline (VIBRAMYCIN) 100 mg, oral, Daily, Take with at least 8 ounces (large glass) of water, do not lie down for 30 minutes after    escitalopram (Lexapro) 10 mg tablet 1 tablet Orally Once a day for 30 day(s)    febuxostat (Uloric) 40 mg tablet TAKE ONE TABLET BY MOUTH EVERY OTHER DAY    fenofibrate (TRIGLIDE) 160 mg, oral, Daily    gabapentin (NEURONTIN) 100 mg, oral, 2 times daily    insulin lispro (HUMALOG) 0-10 Units, subcutaneous, 3 times daily with meals, Take as directed per insulin instructions.    midodrine (PROAMATINE) 15 mg, oral, 3 times daily with meals    nystatin (Mycostatin) 100,000 unit/gram powder APPLY 1 GRAM TO SKIN TWO TIMES A DAY    oxyCODONE-acetaminophen (Percocet) 5-325 mg tablet 1 tablet, oral, Every 12 hours PRN    simvastatin (ZOCOR) 20 mg, oral, Daily       Physical Exam:   Gen: NAD   Neck: no JVD, carotid upstroke is brisk and without delay   Heart: rrr, s1s2+ no mrg   Lungs: CTA   Ext: warm no edema     Assessment/Plan   Hypotension  Chronic diastolic dysfunction  End-stage renal disease on hemodialysis  Peripheral arterial disease  Bilateral heel ischemic ulcers/gangrene   Diabetes mellitus type 2    2/6: The nature of her persistent hypotension is not entirely clear.  Though she has been treated with prolonged antibiotics she has had several issues with sepsis and currently has gangrenous ulcers of the heels.  Her echocardiogram shows normal systolic function.  Left atrial enlargement speaks to some diastolic dysfunction however I would be rather surprised if it were to the extent to where we are dealing with a low output state as a result.  Right heart catheterization would be reasonable for diagnostic purposes however I do not suspect she would be a candidate for any advanced therapies.   Will speak with her daughter who helps make medical decisions for her.    CVC Double lumen Tunneled Right Subclavian (Active)   Site Assessment Clean;Dry;Intact 02/05/24 2310   Dressing Status Clean;Dry;Occlusive 02/05/24 2310   Number of days: 62       Rectal Tube With balloon (Active)   Rectal Tube Output 300 mL 02/06/24 0400   Number of days: 18       Hemodialysis Arteriovenous Fistula Left Upper arm (Active)   Site Assessment Clean;Dry 02/05/24 2310   Dressing Status Other (Comment) 02/05/24 2310   Number of days: 62       Code Status:  Full Code    I spent 45 minutes in the professional and overall care of this patient.        Herminio Lemus, DO

## 2024-02-06 NOTE — CONSULTS
Vascular Access Team  Consult     Visit Date: 2/6/2024      Patient Name: Ayanna Gross         MRN: [id number]                Reason for Consult: picc placement for long term pressors at LTAC            Assessment: Dr. Coulter and Dr. Mondragon ok with picc placement, Lt arm with old AV fistula. Will place in rt arm           Plan: Place dual lumen picc today and remove midline.       Theresa Maloney RN  2/6/2024  10:30 AM

## 2024-02-06 NOTE — CONSULTS
Consults    Reason For Consult  Bilateral lower extremity ulcerations.     History Of Present Illness  Ayanna Gross is a 73 y.o. female presenting with bilateral heel sores secondary to pressure.  Left medial leg wound secondary to pressure.  Patient has been offloading the affected wounds with offloading boots.  No other pedal complaints at this time.     Past Medical History  She has a past medical history of Asthma, CAD (coronary artery disease), CHF (congestive heart failure) (CMS/[facility]), Chronic kidney disease on chronic dialysis (CMS/[facility]), Hypertension, Personal history of diseases of the blood and blood-forming organs and certain disorders involving the immune mechanism, Sleep apnea, and Type 2 diabetes mellitus without complications (CMS/[facility]) (09/15/2022).    Surgical History  She has a past surgical history that includes Other surgical history (11/18/2013); Carpal tunnel release (11/18/2013); Umbilical hernia repair (11/18/2013); Tonsillectomy (11/18/2013); Hand surgery (11/18/2013); Other surgical history (11/18/2013); Other surgical history (11/18/2013); Other surgical history (04/04/2022); Other surgical history (04/04/2022); Other surgical history (04/04/2022); Mastectomy, partial (04/10/2014); [place] lymph node biopsy (04/10/2014); US guided percutaneous placement (6/29/2023); and MR angio head wo IV contrast (8/29/2023).     Social History  She reports that she has never smoked. She has never used smokeless tobacco. She reports that she does not currently use alcohol. She reports that she does not use drugs.    Family History  Family History   Problem Relation Name Age of Onset    Lymphoma Mother      Prostate cancer Father          passed away fabiana eve 2017        Allergies  Patient has no known allergies.    Review of Systems    REVIEW OF SYSTEMS  GENERAL:  Negative for malaise, significant weight loss, fever  HEENT:  No changes in hearing or vision, no nose bleeds or other nasal problems and  "Negative for frequent or significant headaches  NECK:  Negative for lumps, goiter, pain and significant neck swelling  RESPIRATORY:  Negative for cough, wheezing and shortness of breath  CARDIOVASCULAR:  Negative for chest pain, leg swelling and palpitations  GI:  Negative for abdominal discomfort, blood in stools or black stools and change in bowel habits  :  Negative for dysuria, frequency and incontinence  MUSCULOSKELETAL:  Negative for joint pain or swelling, back pain, and muscle pain.  SKIN:  Negative for lesions, rash, and itching  PSYCH:  Negative for sleep disturbance, mood disorder and recent psychosocial stressors  HEMATOLOGY/LYMPHOLOGY:  Negative for prolonged bleeding, bruising easily, and swollen nodes.  ENDOCRINE:  Negative for cold or heat intolerance, polyuria, polydipsia and goiter  NEURO: Negative, denies any burning, tingling or numbness     Objective:   Vasc: DP and PT diminished b/l.  CFT is less than 3 seconds bilateral.  Skin temperature is warm to cool proximal to distal bilateral.      Neuro:  Light touch is intact to the foot bilateral.  Protective sensation is intact to the foot when tested with the 5.07 SWM bilateral.  There is no clonus noted.  The hallux is downgoing bilateral.      Derm: Full-thickness heel ulcerations appreciable to the right and left lower extremity.  Overlying wound bed is covered with eschar that is firm and well adhered.  No evidence of periwound erythema warmth or appreciable drainage.  No evidence of proximal lymphangitic streaking. Exposed tendon left LE       Physical Exam     Last Recorded Vitals  Blood pressure (!) 74/41, pulse 106, temperature 36.2 °C (97.2 °F), resp. rate 21, height 1.575 m (5' 2\"), weight 99 kg (218 lb 4.1 oz), SpO2 92 %.    Relevant Results       Assessment/Plan   Full-thickness bilateral heel ulcerations level fat fascia  Peripheral vascular disease  Type 2 diabetes   Sepsis  ESRD  Hx previous amputations     Patient seen and " evaluated   Reinforced need for betadine wet to dry dressings and aggressive offloading to ensure eschars to the left and right heel remain intact and well adhered, refrain from treating the heel ulcers with the same treatment course as the sacral ulcer    I Explained to patient and family that local debridement is not advised outside the setting of acute local infection, as the persistent pressure will likely worsen the affected pressure wounds and increase the possibility of deeper seeded infection.  In addition to pressure, I explained the patients recent ABIs that demonstrate occlusive changes to the patient's lower extremity that will further impede the prospect of wound healing     I explained that offloading behind the patient's calves will help decrease pressure on the exposed left achilles tendon and that excision of the devitalized tendon would result in overpowering of the patient's extensor tendons further exacerbating heel pressures.     LE Wounds at this time exhibit no worsening signs of infection including proximal lymphangitic streaking, malodor, or jeaneth-wound erythema. Continue betadine dressings with 4x4 gauze, kerlix, and ABD pads with aggressive off-loading.    No active surgical plans unless wounds develop signs of acute infection as listed above.     [place] and [place], If emergent conditions develop warranting surgical incision and debridement. Defer to on call podiatry service        I spent 20 minutes in the professional and overall care of this patient.

## 2024-02-06 NOTE — CARE PLAN
"The patient's goals for the shift include \"transfer out of the hospital to ach\"    The clinical goals for the shift include Remain hemodynamically stable    Problem: Pain  Goal: My pain/discomfort is manageable  Outcome: Progressing     Problem: Safety  Goal: Patient will be injury free during hospitalization  Outcome: Progressing  Goal: I will remain free of falls  Outcome: Progressing     Problem: Daily Care  Goal: Daily care needs are met  Outcome: Progressing     Problem: Psychosocial Needs  Goal: Demonstrates ability to cope with hospitalization/illness  Outcome: Progressing  Goal: Collaborate with me, my family, and caregiver to identify my specific goals  Outcome: Progressing     Problem: Discharge Barriers  Goal: My discharge needs are met  Outcome: Progressing     Problem: Fall/Injury  Goal: Not fall by end of shift  Outcome: Progressing  Goal: Be free from injury by end of the shift  Outcome: Progressing  Goal: Verbalize understanding of personal risk factors for fall in the hospital  Outcome: Progressing  Goal: Verbalize understanding of risk factor reduction measures to prevent injury from fall in the home  Outcome: Progressing  Goal: Pace activities to prevent fatigue by end of the shift  Outcome: Progressing     Problem: Skin  Goal: Decreased wound size/increased tissue granulation at next dressing change  Outcome: Progressing  Flowsheets (Taken 2/6/2024 0115)  Decreased wound size/increased tissue granulation at next dressing change:   Utilize specialty bed per algorithm   Protective dressings over bony prominences   Promote sleep for wound healing  Goal: Participates in plan/prevention/treatment measures  Outcome: Progressing  Flowsheets (Taken 2/6/2024 0115)  Participates in plan/prevention/treatment measures: Elevate heels  Goal: Prevent/manage excess moisture  Outcome: Progressing  Flowsheets (Taken 2/6/2024 0115)  Prevent/manage excess moisture:   Follow provider orders for dressing changes   " Monitor for/manage infection if present   Moisturize dry skin  Goal: Prevent/minimize sheer/friction injuries  Outcome: Progressing  Flowsheets (Taken 2/6/2024 0115)  Prevent/minimize sheer/friction injuries:   Complete micro-shifts as needed if patient unable. Adjust patient position to relieve pressure points, not a full turn   Increase activity/out of bed for meals   Use pull sheet   HOB 30 degrees or less   Turn/reposition every 2 hours/use positioning/transfer devices   Utilize specialty bed per algorithm  Goal: Promote/optimize nutrition  Outcome: Progressing  Flowsheets (Taken 2/6/2024 0115)  Promote/optimize nutrition:   Consume > 50% meals/supplements   Monitor/record intake including meals  Goal: Promote skin healing  Outcome: Progressing  Flowsheets (Taken 2/6/2024 0115)  Promote skin healing:   Assess skin/pad under line(s)/device(s)   Protective dressings over bony prominences   Turn/reposition every 2 hours/use positioning/transfer devices   Ensure correct size (line/device) and apply per  instructions   Rotate device position/do not position patient on device     Problem: Pain  Goal: Takes deep breaths with improved pain control throughout the shift  Outcome: Progressing  Goal: Turns in bed with improved pain control throughout the shift  Outcome: Progressing  Goal: Walks with improved pain control throughout the shift  Outcome: Progressing  Goal: Performs ADL's with improved pain control throughout shift  Outcome: Progressing  Goal: Participates in PT with improved pain control throughout the shift  Outcome: Progressing     Problem: Respiratory  Goal: Clear secretions with interventions this shift  Outcome: Progressing  Goal: Minimize anxiety/maximize coping throughout shift  Outcome: Progressing  Goal: Minimal/no exertional discomfort or dyspnea this shift  Outcome: Progressing  Goal: Patent airway maintained this shift  Outcome: Progressing  Goal: Tolerate pulmonary toileting this  shift  Outcome: Progressing  Goal: Verbalize decreased shortness of breath this shift  Outcome: Progressing  Goal: Increase self care and/or family involvement in next 24 hours  Outcome: Progressing     Problem: Diabetes  Goal: Maintain electrolyte levels within acceptable range throughout shift  Outcome: Progressing  Goal: Maintain glucose levels >70mg/dl to <250mg/dl throughout shift  Outcome: Progressing  Goal: No changes in neurological exam by end of shift  Outcome: Progressing  Goal: Learn about and adhere to nutrition recommendations by end of shift  Outcome: Progressing  Goal: Vital signs within normal range for age by end of shift  Outcome: Progressing  Goal: Increase self care and/or family involovement by end of shift  Outcome: Progressing  Goal: Receive DSME education by end of shift  Outcome: Progressing     Problem: Discharge Planning  Goal: Discharge to home or other facility with appropriate resources  Outcome: Progressing     Problem: Chronic Conditions and Co-morbidities  Goal: Patient's chronic conditions and co-morbidity symptoms are monitored and maintained or improved  Outcome: Progressing

## 2024-02-06 NOTE — CARE PLAN
Problem: Respiratory  Goal: Clear secretions with interventions this shift  Outcome: Progressing  Goal: Minimize anxiety/maximize coping throughout shift  Outcome: Progressing  Goal: Minimal/no exertional discomfort or dyspnea this shift  Outcome: Progressing  Goal: Patent airway maintained this shift  Outcome: Progressing  Goal: Tolerate pulmonary toileting this shift  Outcome: Progressing  Goal: Verbalize decreased shortness of breath this shift  Outcome: Progressing  Goal: Increase self care and/or family involvement in next 24 hours  Outcome: Progressing

## 2024-02-06 NOTE — PROGRESS NOTES
Patient not medically clear. Patient remains in the ICU. Patient to have cardiac catheterization. TCC spoke to Dr. Shane and I cancelled the appeal for LTACH as patient with heart failure. At this time there is not a safe discharge plan in place. Will follow.     **PATIENT DOES NOT HAVE A SAFE DISCHARGE PLAN      Shauna Jensen RN

## 2024-02-06 NOTE — CARE PLAN
Problem: Discharge Planning  Goal: Discharge to home or other facility with appropriate resources  Outcome: Not Progressing  Flowsheets (Taken 2/1/2024 1837)  Discharge to home or other facility with appropriate resources:   Identify barriers to discharge with patient and caregiver   Arrange for needed discharge resources and transportation as appropriate   Identify discharge learning needs (meds, wound care, etc)   Arrange for interpreters to assist at discharge as needed   Refer to discharge planning if patient needs post-hospital services based on physician order or complex needs related to functional status, cognitive ability or social support system     Problem: Pain  Goal: My pain/discomfort is manageable  Outcome: Progressing  Flowsheets (Taken 2/2/2024 1928)  Resident's pain/discomfort is manageable:   Include resident/family/caregiver in decisions related to pain management   Offer non-pharmacological pain management interventions   Administer pain medication prior to activities that may trigger pain   Identify and avoid pain triggers     Problem: Safety  Goal: Patient will be injury free during hospitalization  Outcome: Progressing  Goal: I will remain free of falls  Outcome: Progressing     Problem: Daily Care  Goal: Daily care needs are met  Outcome: Progressing  Flowsheets (Taken 2/2/2024 1928)  Daily care needs are met:   Assess and monitor ability to perform self care and identify potential discharge needs   Assess skin integrity/risk for skin breakdown   Encourage independent activity per ability   Provide mouth care   Include patient/family/caregiver in decisions related to daily care   Assist patient with activities of daily living as needed     Problem: Psychosocial Needs  Goal: Demonstrates ability to cope with hospitalization/illness  Outcome: Progressing  Flowsheets (Taken 2/1/2024 1837)  Demonstrates ability to cope with hospitalization/illness:   Encourage verbalization of  feelings/concerns/expectations   Provide low-stimulation environment as needed   Assist resident to identify and practice own strengths and abilities   Encourage resident to set and complete small goals for self   Encourage participation in diversional activities   Reinforce positive adaptation of new coping behaviors   Include resident/family/caregiver in decisions related to psychosocial needs  Goal: Collaborate with me, my family, and caregiver to identify my specific goals  Outcome: Progressing     Problem: Discharge Barriers  Goal: My discharge needs are met  Outcome: Progressing  Flowsheets (Taken 2/2/2024 1928)  Resident's discharge needs are met:   Identify potential discharge barriers on admission and throughout stay   Involve resident/family/caregiver in discharge planning process     Problem: Fall/Injury  Goal: Not fall by end of shift  Outcome: Progressing  Goal: Be free from injury by end of the shift  Outcome: Progressing  Goal: Verbalize understanding of personal risk factors for fall in the hospital  Outcome: Progressing  Goal: Verbalize understanding of risk factor reduction measures to prevent injury from fall in the home  Outcome: Progressing  Goal: Pace activities to prevent fatigue by end of the shift  Outcome: Progressing     Problem: Skin  Goal: Decreased wound size/increased tissue granulation at next dressing change  Outcome: Progressing  Goal: Participates in plan/prevention/treatment measures  Outcome: Progressing  Goal: Prevent/manage excess moisture  Outcome: Progressing  Goal: Prevent/minimize sheer/friction injuries  Outcome: Progressing  Flowsheets (Taken 2/5/2024 1921)  Prevent/minimize sheer/friction injuries:   HOB 30 degrees or less   Complete micro-shifts as needed if patient unable. Adjust patient position to relieve pressure points, not a full turn   Turn/reposition every 2 hours/use positioning/transfer devices   Increase activity/out of bed for meals   Use pull sheet    Utilize specialty bed per algorithm  Goal: Promote/optimize nutrition  Outcome: Progressing  Flowsheets (Taken 2/5/2024 1921)  Promote/optimize nutrition:   Discuss with provider if NPO > 2 days   Monitor/record intake including meals  Goal: Promote skin healing  Outcome: Progressing  Flowsheets (Taken 2/5/2024 1921)  Promote skin healing:   Assess skin/pad under line(s)/device(s)   Ensure correct size (line/device) and apply per  instructions   Protective dressings over bony prominences   Rotate device position/do not position patient on device   Turn/reposition every 2 hours/use positioning/transfer devices     Problem: Pain  Goal: Takes deep breaths with improved pain control throughout the shift  Outcome: Progressing  Goal: Turns in bed with improved pain control throughout the shift  Outcome: Progressing  Goal: Walks with improved pain control throughout the shift  Outcome: Progressing  Goal: Performs ADL's with improved pain control throughout shift  Outcome: Progressing     Problem: Respiratory  Goal: Clear secretions with interventions this shift  Outcome: Progressing  Goal: Minimize anxiety/maximize coping throughout shift  Outcome: Progressing  Goal: Minimal/no exertional discomfort or dyspnea this shift  Outcome: Progressing  Goal: Patent airway maintained this shift  Outcome: Progressing  Goal: Tolerate pulmonary toileting this shift  Outcome: Progressing  Goal: Verbalize decreased shortness of breath this shift  Outcome: Progressing  Goal: Increase self care and/or family involvement in next 24 hours  Outcome: Progressing     Problem: Diabetes  Goal: Maintain electrolyte levels within acceptable range throughout shift  Outcome: Progressing  Goal: Maintain glucose levels >70mg/dl to <250mg/dl throughout shift  Outcome: Progressing  Goal: No changes in neurological exam by end of shift  Outcome: Progressing  Goal: Learn about and adhere to nutrition recommendations by end of shift  Outcome:  "Progressing  Goal: Vital signs within normal range for age by end of shift  Outcome: Progressing  Goal: Increase self care and/or family involovement by end of shift  Outcome: Progressing     Problem: Chronic Conditions and Co-morbidities  Goal: Patient's chronic conditions and co-morbidity symptoms are monitored and maintained or improved  Outcome: Progressing  Flowsheets (Taken 2/1/2024 8342)  Care Plan - Patient's Chronic Conditions and Co-Morbidity Symptoms are Monitored and Maintained or Improved:   Monitor and assess patient's chronic conditions and comorbid symptoms for stability, deterioration, or improvement   Collaborate with multidisciplinary team to address chronic and comorbid conditions and prevent exacerbation or deterioration   Update acute care plan with appropriate goals if chronic or comorbid symptoms are exacerbated and prevent overall improvement and discharge   The patient's goals for the shift include \"transfer out of the hospital to [facility]\"    The clinical goals for the shift include remain hemodynamically stable    Over the shift, the patient did not make progress toward the following goals. Barriers to progression include: not deemed stable to transfer to [facility]ACH on levophed. Recommendations to address these barriers include continue to titrate as tolerated.    Problem: Discharge Planning  Goal: Discharge to home or other facility with appropriate resources  Outcome: Not Progressing  Flowsheets (Taken 2/1/2024 4011)  Discharge to home or other facility with appropriate resources:   Identify barriers to discharge with patient and caregiver   Arrange for needed discharge resources and transportation as appropriate   Identify discharge learning needs (meds, wound care, etc)   Arrange for interpreters to assist at discharge as needed   Refer to discharge planning if patient needs post-hospital services based on physician order or complex needs related to functional status, cognitive ability or " social support system

## 2024-02-06 NOTE — PROGRESS NOTES
Ayanna Gross is a 73 y.o. female on day 18 of admission presenting with Septic shock (CMS/[facility]).    Critical Care Medicine Progress Note    Admitted on:     1/19/2024  Length of Stay: 18 day(s)     Interval History     Still on levo 0.08  No other complaints  Did not want to get in a chair.    Objective   Objective     Vitals:    02/06/24 0400   Weight: 99 kg (218 lb 4.1 oz)   Body mass index is 39.92 kg/m².        2/6/2024     3:32 PM 2/6/2024     3:45 PM 2/6/2024     4:00 PM 2/6/2024     4:30 PM 2/6/2024     4:45 PM 2/6/2024     5:00 PM 2/6/2024     5:15 PM   Vitals   Systolic 66 75 84 73 106 87 82   Diastolic 43 41 30 56 27 32 61   Heart Rate 107 106 106 106 107 106 106   Resp 20 16 17 17 22 20 23        Vent settings:       Intake/Output Summary (Last 24 hours) at 2/6/2024 1734  Last data filed at 2/6/2024 1700  Gross per 24 hour   Intake 540.22 ml   Output 2200 ml   Net -1659.78 ml     Physical exam:  -----------------  Awake and oriented to place and person  Cardiovascular:      Rate and Rhythm: Normal rate. Rhythm irregular.   Pulmonary:      Effort: Pulmonary effort is normal.   Abdominal:      General: Abdomen is flat.   Musculoskeletal:         General: Deformity present. Normal range of motion.      Cervical back: Normal range of motion.      Comments: Abscence of left hand first finger   Skin:     General: Skin is warm.      Capillary Refill: Capillary refill takes less than 2 seconds.      Comments: Skin tear healing to L hand  Wound to bilateral feet and sacrum     Medications     Scheduled Medications:   [Held by provider] apixaban, 2.5 mg, oral, BID  doxycylcine, 100 mg, oral, Daily  [START ON 2/7/2024] epoetin di or biosimilar, 10,000 Units, intravenous, Every Mon/Wed/Fri  escitalopram, 10 mg, oral, Nightly  gabapentin, 100 mg, oral, TID  heparin, 2,000 Units, intra-catheter, After Dialysis  heparin, 2,000 Units, intra-catheter, After Dialysis  heparin, 2,000 Units, intra-catheter, After  Dialysis  heparin, 2,000 Units, intra-catheter, After Dialysis  insulin lispro, 0-10 Units, subcutaneous, TID with meals  ipratropium-albuteroL, 3 mL, nebulization, q6h while awake  lidocaine, 5 mL, infiltration, Once  midodrine, 15 mg, oral, TID with meals  nystatin, , Topical, BID  nystatin, , Topical, BID  pantoprazole, 40 mg, oral, Daily   Or  pantoprazole, 40 mg, intravenous, Daily  perflutren lipid microspheres, 0.5-10 mL of dilution, intravenous, Once in imaging  perflutren protein A microsphere, 0.5 mL, intravenous, Once in imaging  psyllium, 1 packet, oral, BID  sodium chloride, 3 mL, nebulization, TID  sulfur hexafluoride microsphr, 2 mL, intravenous, Once in imaging  sulfur hexafluoride microsphr, 2 mL, intravenous, Once in imaging  vancomycin, 125 mg, oral, BID  zinc oxide, 1 Application, Topical, BID       Continuous Medications:   norepinephrine, 0.01-3 mcg/kg/min, Last Rate: 0.09 mcg/kg/min (02/06/24 1700)       PRN Medications:     Labs     Results from last 72 hours   Lab Units 02/06/24  0454 02/05/24  0506 02/04/24  0508   GLUCOSE mg/dL 188* 192* 164*   SODIUM mmol/L 133 128* 128*   POTASSIUM mmol/L 4.1 4.9 4.3   CHLORIDE mmol/L 99 97 95*   CO2 mmol/L 21* 16* 18*   BUN mg/dL 13 21 16   CREATININE mg/dL 2.40* 3.40* 2.70*   EGFR mL/min/1.73m*2 21* 14* 18*   CALCIUM mg/dL 8.5 8.5 8.7   ALBUMIN g/dL 2.5* 2.4* 2.7*   MAGNESIUM mg/dL 1.70 2.10 1.90     Results from last 72 hours   Lab Units 02/06/24  0454 02/05/24  0506 02/04/24  0508   ALK PHOS U/L 353* 234* 223*   ALT U/L 20 12 11   AST U/L 66* 29 22   BILIRUBIN TOTAL mg/dL 0.9 0.8 1.1   PROTEIN TOTAL g/dL 5.7* 5.6* 5.7*             Results from last 72 hours   Lab Units 02/06/24  0454 02/05/24  0506 02/04/24  0508   WBC AUTO x10*3/uL 11.1 11.7* 14.1*   NRBC AUTO /100 WBCs 0.0 0.0 0.0   RBC AUTO x10*6/uL 2.91* 3.13* 3.18*   HEMOGLOBIN g/dL 8.8* 9.5* 9.7*   HEMATOCRIT % 26.4* 31.4* 30.2*   MCV fL 91 100 95   MCH pg 30.2 30.4 30.5   MCHC g/dL 33.3 30.3*  32.1   RDW % 22.7* 24.1* 24.0*   PLATELETS AUTO x10*3/uL 169 259 243         Lab Results   Component Value Date    BLOODCULT No growth at 4 days -  FINAL REPORT 01/19/2024    BLOODCULT No growth at 4 days -  FINAL REPORT 01/19/2024    GRAMSTAIN (A) 12/15/2023     Gram stain indicates specimen contains significant salivary contamination.     Lab Results   Component Value Date    URINECULTURE >100,000 Proteus mirabilis (A) 01/19/2024       Imaging and Diagnostic Studies     Lab/Radiology/Diagnostic Review:  Results for orders placed or performed during the hospital encounter of 01/19/24 (from the past 24 hour(s))   POCT GLUCOSE   Result Value Ref Range    POCT Glucose 187 (H) 74 - 99 mg/dL   CBC   Result Value Ref Range    WBC 11.1 4.4 - 11.3 x10*3/uL    nRBC 0.0 0.0 - 0.0 /100 WBCs    RBC 2.91 (L) 4.00 - 5.20 x10*6/uL    Hemoglobin 8.8 (L) 12.0 - 16.0 g/dL    Hematocrit 26.4 (L) 36.0 - 46.0 %    MCV 91 80 - 100 fL    MCH 30.2 26.0 - 34.0 pg    MCHC 33.3 32.0 - 36.0 g/dL    RDW 22.7 (H) 11.5 - 14.5 %    Platelets 169 150 - 450 x10*3/uL   Comprehensive metabolic panel   Result Value Ref Range    Glucose 188 (H) 65 - 99 mg/dL    Sodium 133 133 - 145 mmol/L    Potassium 4.1 3.4 - 5.1 mmol/L    Chloride 99 97 - 107 mmol/L    Bicarbonate 21 (L) 24 - 31 mmol/L    Urea Nitrogen 13 8 - 25 mg/dL    Creatinine 2.40 (H) 0.40 - 1.60 mg/dL    eGFR 21 (L) >60 mL/min/1.73m*2    Calcium 8.5 8.5 - 10.4 mg/dL    Albumin 2.5 (L) 3.5 - 5.0 g/dL    Alkaline Phosphatase 353 (H) 35 - 125 U/L    Total Protein 5.7 (L) 5.9 - 7.9 g/dL    AST 66 (H) 5 - 40 U/L    Bilirubin, Total 0.9 0.1 - 1.2 mg/dL    ALT 20 5 - 40 U/L    Anion Gap 13 <=19 mmol/L   Magnesium   Result Value Ref Range    Magnesium 1.70 1.60 - 3.10 mg/dL   POCT GLUCOSE   Result Value Ref Range    POCT Glucose 162 (H) 74 - 99 mg/dL   POCT GLUCOSE   Result Value Ref Range    POCT Glucose 147 (H) 74 - 99 mg/dL   Transthoracic Echo (TTE) Limited   Result Value Ref Range    BSA 2.08 m2    POCT GLUCOSE   Result Value Ref Range    POCT Glucose 224 (H) 74 - 99 mg/dL     Upper extremity venous duplex bilateral    Result Date: 1/25/2024           [facility] [id number] [place] [address]            Phone [phone]  Vascular Lab Report  [facility] US UPPER EXTREMITY VENOUS DUPLEX BILATERAL Patient Name:      BASIA VEGA      Reading Physician:  [id number] Mini Busby MD, RPVI Study Date:        1/25/2024           Ordering Provider:  [id number] ERI BERMAN MRN/PID:           [id number]            Fellow: Accession#:        [id number]        Technologist:       Dara Parr RVT Date of Birth/Age: 1950 / 73 years Technologist 2: Gender:            F                   Encounter#:         [id number] Admission Status:  Inpatient           Location Performed: [facility]  Diagnosis/ICD: Right arm swelling-M79.89; Left arm swelling-M79.89 CPT Codes:     22368 Peripheral venous duplex scan for DVT complete  CONCLUSIONS:  Right Upper Venous: No evidence of acute deep vein thrombus visualized in the right upper extremity. Internal jugular vein was visualized in segments due to IV lines and bandages. Left Upper Venous: No evidence of acute deep vein thrombus visualized in the left upper extremity. Subclavian stent is noted and appears patent. There is a known occluded dialysis access noted.  Additional Findings: Technically difficult exam due to IV lines, bandages, and patient's positioning.  Imaging & Doppler Findings:  Right               Compressible Thrombus        Flow Internal Jugular        Yes        None   Spontaneous/Phasic Subclavian              Yes        None Subclavian Proximal     Yes        None   Spontaneous/Phasic Subclavian Mid          Yes        None Subclavian Distal       Yes        None   Spontaneous/Phasic Axillary                Yes        None       Pulsatile Brachial                Yes         None Cephalic                Yes        None Basilic                 Yes        None  Left                Compress Thrombus        Flow Internal Jugular      Yes      None       Pulsatile Subclavian            Yes      None Subclavian Proximal   Yes      None       Pulsatile Subclavian Mid        Yes      None       Pulsatile Subclavian Distal     Yes      None       Pulsatile Axillary              Yes      None   Spontaneous/Phasic Brachial              Yes      None Cephalic              Yes      None Basilic               Yes      None  [id number] Mini Busby MD, RPVI Electronically signed by [id number] Mini Busby MD, RPVI on 1/25/2024 at 4:06:13 PM  ** Final **     ECG 12 lead    Result Date: 1/24/2024  Sinus tachycardia  with 1st degree AV block Right bundle branch block Possible Lateral infarct , age undetermined Abnormal ECG Confirmed by Yesika Nj ([id number]) on 1/24/2024 6:54:45 AM    XR tibia fibula right 2 views    Result Date: 1/22/2024  Interpreted By:  Real Mendieta, STUDY: XR TIBIA FIBULA RIGHT 2 VIEWS; 1/22/2024 2:15 pm   INDICATION: Signs/Symptoms:evaluate source of infection, osteomyelitis;   COMPARISON: None available.   ACCESSION NUMBER(S): [id number]   ORDERING CLINICIAN: BAN GIFFORD   TECHNIQUE: Views: AP and Lateral of the right tibia and fibula   FINDINGS: RESULT: There is no evidence for fracture or dislocation. Tricompartmental degenerative changes of the right knee. The tibia and fibula appear intact without bony erosion or evidence for osteomyelitis. No other bony or soft tissue abnormality is identified. No subcutaneous gas is noted.       No evidence for acute osseous abnormality. No bony erosion to suggest osteomyelitis.   Signed by: Real Mendieta 1/22/2024 3:15 PM Dictation workstation:   [id number]    XR ankle right 3+ views    Result Date: 1/22/2024  Interpreted By:  Real Mendieta, STUDY: XR ANKLE RIGHT 3+ VIEWS; 1/22/2024 2:15 pm   INDICATION: Signs/Symptoms:Evuluate  source of infection, osteomyelitis;   COMPARISON: None available.   ACCESSION NUMBER(S): [id number]   ORDERING CLINICIAN: BAN GIFFORD   TECHNIQUE: Views: AP, Lateral, Oblique, right ankle   FINDINGS: RESULT: There is no evidence for fracture or dislocation. The ankle mortise is intact. Joint spaces appear adequately maintained. No bony erosion to suggest osteomyelitis. No bone lesion or soft tissue abnormality is identified. No subcutaneous gas is seen.       No evidence for acute osseous abnormality. No bony erosion to suggest osteomyelitis.   Signed by: Real Mendieta 1/22/2024 3:14 PM Dictation workstation:   [id number]    Transthoracic Echo (TTE) Complete    Result Date: 1/22/2024           [place] [address]            Phone [phone] TRANSTHORACIC ECHOCARDIOGRAM REPORT  Patient Name:      BASIA Gray Physician:   [id number] Fausto Rowe DO Study Date:        1/22/2024           Ordering Provider:   [id number] ERI BERMAN MRN/PID:           [id number]            Fellow: Accession#:        [id number]        Nurse: Date of Birth/Age: 1950 / 73 years Sonographer:         Tabatha Page RDCS Gender:            F                   Additional Staff: Height:            157.00 cm           Admit Date: Weight:            75.00 kg            Admission Status:    Inpatient - Routine BSA:               1.76 m2             Department Location: [facility] Blood Pressure: 88 /49 mmHg Study Type:    TRANSTHORACIC ECHO (TTE) COMPLETE Diagnosis/ICD: Chronic combined systolic (congestive) and diastolic (congestive)                heart failure (CHF)-I50.42; Sepsis, unspecified organism-A41.9 Indication:    heart failure,septic shock CPT Codes:     Echo Complete w Full Doppler-42392 Patient History: BMI:               Obese >30 Pertinent History: Sepsis, PVD, A-Fib, CVA, Cancer and HTN. breast                     prosthesis/ca, ESRD,anemia,septic shock,heart failure. Study Detail: The following Echo studies were performed: 2D, M-Mode, Doppler and               color flow. Technically challenging study due to prosthesis,               prominent lung artifact and body habitus.  PHYSICIAN INTERPRETATION: Left Ventricle: Left ventricular systolic function is normal, with an estimated ejection fraction of 70%. There are no regional wall motion abnormalities. The left ventricular cavity size is normal. Left ventricular diastolic filling was indeterminate. Left Atrium: The left atrium is moderately dilated. Right Ventricle: The right ventricle is normal in size. There is normal right ventricular global systolic function. Right Atrium: The right atrium is normal in size. Aortic Valve: The aortic valve is trileaflet. There is no evidence of aortic valve regurgitation. The peak instantaneous gradient of the aortic valve is 2.8 mmHg. Mitral Valve: The mitral valve is normal in structure. There is no evidence of mitral valve regurgitation. Tricuspid Valve: The tricuspid valve is structurally normal. There is trace tricuspid regurgitation. Pulmonic Valve: The pulmonic valve is not well visualized. The pulmonic valve regurgitation was not well visualized. Pericardium: There is no pericardial effusion noted. Aorta: The aortic root is normal.  CONCLUSIONS:  1. Left ventricular systolic function is normal with a 70% estimated ejection fraction.  2. The left atrium is moderately dilated.  3. Left ventricular diastolic filling indeterminate. QUANTITATIVE DATA SUMMARY: 2D MEASUREMENTS:                          Normal Ranges: LAs:           4.50 cm   (2.7-4.0cm) IVSd:          0.61 cm   (0.6-1.1cm) LVPWd:         0.88 cm   (0.6-1.1cm) LVIDd:         3.66 cm   (3.9-5.9cm) LV Mass Index: 41.9 g/m2 LV SYSTOLIC FUNCTION BY 2D PLANIMETRY (MOD):                     Normal Ranges: EF-A4C View: 68.3 % (>=55%) LV DIASTOLIC FUNCTION:                      Normal Ranges: MV Peak E: 1.19 m/s (0.7-1.2 m/s) MV Peak A: 0.90 m/s (0.42-0.7 m/s) E/A Ratio: 1.32     (1.0-2.2) MITRAL VALVE:                 Normal Ranges: MV DT: 192 msec (150-240msec) AORTIC VALVE:                         Normal Ranges: AoV Vmax:      0.84 m/s (<=1.7m/s) AoV Peak P.8 mmHg (<20mmHg) LVOT Max Shimon:  0.47 m/s (<=1.1m/s) LVOT Diameter: 1.90 cm  (1.8-2.4cm) AoV Area,Vmax: 1.57 cm2 (2.5-4.5cm2) TRICUSPID VALVE/RVSP:                   Normal Ranges: IVC Diam: 1.05 cm  [id number] Fausto Rowe DO Electronically signed on 2024 at 2:53:04 PM  ** Final **     XR foot left 3+ views    Result Date: 2024  Interpreted By:  Real Mendieta, STUDY: XR FOOT LEFT 1-2 VIEWS; 2024 4:15 pm   INDICATION: Signs/Symptoms:osteomyelitis. Pain   COMPARISON: 2023   ACCESSION NUMBER(S): [id number]   ORDERING CLINICIAN: BAN GIFFORD   TECHNIQUE: Views:  AP, Lat, Oblique, left foot   FINDINGS: RESULT: There is no evidence for fracture or dislocation. 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 Mild hammertoe deformities. Joint spaces appear adequately maintained. Soft tissue ulceration of the posterior aspect of the heel however no evidence for bony erosion to suggest osteomyelitis.       No evidence for acute fracture or acute bony erosion to suggest osteomyelitis. Chronic changes as described.   Signed by: Real Mendieta 2024 7:15 PM Dictation workstation:   [id number]    CT chest abdomen pelvis wo IV contrast    Result Date: 2024  Interpreted By:  Maria Garcia, STUDY: CT CHEST ABDOMEN PELVIS WO CONTRAST;  2024 11:42 pm   INDICATION: Mental status change. Elevated white blood cell count with infectious workup.   COMPARISON: 2023   ACCESSION NUMBER(S): [id number]   ORDERING CLINICIAN: BAN GIFFORD   TECHNIQUE: CT of the chest, abdomen and pelvis was performed with no oral or intravenous contrast administered. Sagittal and coronal reformations were  completed by the technologist at the acquisition scanner.   All CT examinations are performed with 1 or more of the following dose reduction techniques: Automated exposure control, adjustment of mA and/or kv according to patient's size, or use of iterative reconstruction techniques.   FINDINGS: Please note that the study is limited without intravenous contrast.   CHEST: Bilateral pleural effusions are present with right effusion moderately small in size and with the left effusion moderately small in size as well. There is shift of the heart and mediastinum to the left of midline due to atelectasis of the left upper lobe. There is consolidation throughout left lower lobe with air bronchograms noted. On the right, there is compressive atelectasis seen posteriorly in the right lower lobe.   There is mild reactive mediastinal adenopathy seen at this time with mediastinal lymph nodes increased in size since prior study. Several of these mediastinal nodes are at the upper limits of normal measuring 8 mm in short axis diameter.   No pericardial effusion is present. The cardiac size is normal with mitral annulus calcification.   There has been prior bilateral mastectomy with reconstruction of the left breast with implant placement. Surgical clips are visible within the left axilla. Stent grafts are visible within the left upper extremity and within the left innominate, subclavian as well as axillary veins.   A peripherally calcified 1.8 cm nodule arises from the lower pole of left thyroid lobe.   ABDOMEN:   LIVER: Unremarkable.   BILE DUCTS: No intrahepatic or extrahepatic biliary ductal dilatation is seen.   GALLBLADDER: Cholelithiasis is observed with some calcification of the gallbladder wall demonstrated as well. No gallbladder wall thickening or pericholecystic fluid is evident.   PANCREAS: Unremarkable   SPLEEN: Unremarkable   ADRENAL GLANDS: Unremarkable   KIDNEYS AND URETERS: Kidneys are small in size with extensive  renal artery calcification demonstrated.   PELVIS:   BLADDER: Amos catheter is present within the decompressed urinary bladder.   REPRODUCTIVE ORGANS: Calcification of the arcuate arteries within the uterus is seen. There is no uterine enlargement or adnexal mass.   BOWEL: A rectal balloon is seen. There is no abnormal distention of the intestinal tract.   VESSELS: There is atherosclerosis of the thoracoabdominal aorta and iliac arteries with calcification in the walls of the celiac, splenic, hepatic, and mesenteric arteries.   PERITONEUM/RETROPERITONEUM/LYMPH NODES: There is a minimal amount of ascites seen about the liver and spleen with mild presacral edema noted.   ABDOMINAL WALL: There is anasarca involving the soft tissues of the abdomen and pelvis. Postoperative change from ventral hernia repair is seen.   BONES: Bilateral sacroiliitis is seen. There is lumbar dextroscoliosis. Chronic rotator cuff tear is present bilaterally with osteoarthritis of both shoulders, right greater than left.       Chest 1.  Moderately small bilateral pleural effusions with left upper lobe atelectasis and compressive atelectasis seen posteriorly in right lower lobe. A left lower lobe pneumonia is identified. There is extensive airspace consolidation within left lower lobe with air bronchograms observed. Mild mediastinal reactive adenopathy is present as well.   Abdomen-Pelvis 1.  Cholelithiasis without evidence of cholecystitis. 2. Small amount of ascites with mild presacral edema and anasarca within the soft tissues of the abdominal wall. 3. Renal atrophy with extensive vascular calcifications.     MACRO: None   Signed by: Maria Garcia 1/21/2024 8:30 AM Dictation workstation:   [id number]    CT head wo IV contrast    Result Date: 1/21/2024  Interpreted By:  Maria Garcia, STUDY: CT HEAD WO IV CONTRAST 1/20/2024 11:42 pm   INDICATION: Signs/Symptoms:mental status changes   COMPARISON: 12/11/2023   ACCESSION NUMBER(S): [id number]    ORDERING CLINICIAN: BAN GIFFORD   TECHNIQUE: Unenhanced axial images of the brain are completed.   All CT examinations are performed with 1 or more of the following dose reduction techniques: Automated exposure control, adjustment of mA and/or kv according to patient's size, or use of iterative reconstruction techniques.   FINDINGS: Helical unenhanced axial images of the brain demonstrate a mild to moderate degree of ventricular enlargement with proportionate widening of the sulci and sylvian fissures. There is no midline shift, mass effect, extra-axial fluid collection, or acute intracranial hemorrhage. There is diminished density seen in the periventricular white matter indicating chronic microvascular ischemic disease. There is calcified plaque seen within the distal vertebral and internal carotid arteries bilaterally. No calvarial abnormality is seen.       Atrophy and chronic microvascular ischemic disease without acute intracranial process.   Signed by: Maria Garcia 1/21/2024 8:09 AM Dictation workstation:   [id number]    XR chest 1 view    Result Date: 1/20/2024  Interpreted By:  Brendon Perez, STUDY: XR CHEST 1 VIEW; 1/20/2024 5:03 pm   INDICATION: CLINICAL INFORMATION: Signs/Symptoms:Insertion right IJ central line, also left thoracentesis.   COMPARISON: 01/19/2024 at 1338 hours   ACCESSION NUMBER(S): [id number]   ORDERING CLINICIAN: BOZENA ANTONIO   TECHNIQUE: Portable chest one view.   FINDINGS: The cardiac size is indeterminate in view of the AP projection. There is no change in the right-sided dual port central venous catheter. There is a new right internal jugular venous catheter present with the tip at approximately same level as the dual port central venous catheter, overlying the mid to lower right atrium. There is no evidence for pneumothorax. Patient has a history of left thoracentesis without evidence for pneumothorax on the left. There is bilateral basilar alveolar infiltrate and effusions.  Left effusion is decreased compared to the study from 1 day earlier.   Surgical clips are identified within left chest wall. Endovascular stent is identified in the distribution of the left subclavian artery.       1. Status post right-sided central venous catheter placement as described above with the tip overlying the mid to caudal aspect of the right atrium. There is no evidence for pneumothorax. 2. No evidence for left-sided pneumothorax after left-sided thoracentesis. Decrease in the left effusion. 3. Bilateral basilar infiltrates and effusions are present. Follow-up to assure complete clearing is suggested.   MACRO: none   Signed by: Brednon Perez 1/20/2024 5:11 PM Dictation workstation:   [id number]    XR chest 1 view    Result Date: 1/19/2024  Interpreted By:  Real Mendieta, STUDY: XR CHEST 1 VIEW; 1/19/2024 1:38 pm   INDICATION: Signs/Symptoms:dyspnea.   COMPARISON: 12/26/2023   ACCESSION NUMBER(S): [id number]   ORDERING CLINICIAN: EMELY GOLDSMITH   TECHNIQUE: 1 view of the chest was performed.   FINDINGS: There may be a minimal right pleural effusion. Slight prominence of the interstitium otherwise the right lung is clear. Improved appearance of the left lung with improved aeration of the right upper lobe. There is opacification of the left lower lobe possibly due to large pleural effusion which is similar to the prior study. No pneumothorax. Right-sided dual lumen central line catheter with tip at the proximal atrium. The cardiomediastinal silhouette is within normal limits. Left-sided subclavian stents are again noted.       Improved aeration and appearance of the left lung superiorly however there is a persistent large left pleural effusion and opacification of the left lower lobe.   Signed by: Real Mendieta 1/19/2024 1:44 PM Dictation workstation:   [id number]         Assessment / Plan       PROBLEM LIST:  - End stage heart failure with preserved EF - on vasopressors  - End stage renal disease  on HD  - Cardiogenic shock  - Acute metabolic encephalopathy  - Failure to wean from vasopressors  - Goals of care discussion    MANAGEMENT PLAN:  - Discussed at length with daughter about the end stage nature of her disease with multiple organ failure  - Cardiology consultation - plan for a RHC tomorrow  - Will offer patient palliation or transfer to Advanced heart failure service at [facility]  - Peer to peer done with insurance, NOT approved  - Family will update us with goals of care  - Cont Doxy and Vanc PO. Appreciate ID consult.  - Routine ICU care  - PT/OT    I have personally interviewed and examined the patient.  I have personally verified elements of the exam listed above. Interval changes or irregularities are as noted.  I have personally and independently reviewed laboratory, radiographic and procedural data.  I have personally reviewed the problem list above and concur. Changes, if any, are noted.  I have personally reviewed the plan list above and concur. Changes, if any, are noted.    The patient has high probability of compromise including but not limited to organ system failure, mechanical respiratory and circulatory support, cardiac arrest and death. I have discussed this in detail with the family / caregivers.    I have personally spent 50 minutes of face to face critical care time (not including billable procedures billed separately) in taking care of the patient.  I have personally answered all questions to the satisfaction of the patient and / or family members to their satisfaction.        Minh Shane MD

## 2024-02-06 NOTE — CARE PLAN
Pt was maintaining sats on room air spo2 91-93. O2 as needed.   Problem: Respiratory  Goal: Wean oxygen to maintain O2 saturation per order/standard this shift  Outcome: Progressing

## 2024-02-07 NOTE — PROGRESS NOTES
Patient not medically clear. Patient remains in the ICU on pressors. Patient's family declining cardiac catheterization. Patient denied for LTACH. Palliative care consulted again. At this time there is not a safe discharge plan in place. Will follow.     **PATIENT DOES NOT HAVE A SAFE DISCHARGE PLAN      Shauna Jensen RN

## 2024-02-07 NOTE — CARE PLAN
"The patient's goals for the shift include \"transfer out of the hospital to [facility]\"    The clinical goals for the shift include Maintain hemodynamic stability throughout shift and decrease pressor requirements as tolerated per protocol    Problem: Pain  Goal: My pain/discomfort is manageable  Outcome: Progressing     Problem: Safety  Goal: Patient will be injury free during hospitalization  Outcome: Progressing  Goal: I will remain free of falls  Outcome: Progressing     Problem: Daily Care  Goal: Daily care needs are met  Outcome: Progressing     Problem: Psychosocial Needs  Goal: Demonstrates ability to cope with hospitalization/illness  Outcome: Progressing  Goal: Collaborate with me, my family, and caregiver to identify my specific goals  Outcome: Progressing     Problem: Discharge Barriers  Goal: My discharge needs are met  Outcome: Progressing     Problem: Fall/Injury  Goal: Not fall by end of shift  Outcome: Progressing  Goal: Be free from injury by end of the shift  Outcome: Progressing  Goal: Verbalize understanding of personal risk factors for fall in the hospital  Outcome: Progressing  Goal: Verbalize understanding of risk factor reduction measures to prevent injury from fall in the home  Outcome: Progressing  Goal: Pace activities to prevent fatigue by end of the shift  Outcome: Progressing     Problem: Skin  Goal: Decreased wound size/increased tissue granulation at next dressing change  Outcome: Progressing  Flowsheets (Taken 2/6/2024 2100)  Decreased wound size/increased tissue granulation at next dressing change:   Protective dressings over bony prominences   Promote sleep for wound healing   Utilize specialty bed per algorithm  Goal: Participates in plan/prevention/treatment measures  Outcome: Progressing  Flowsheets (Taken 2/6/2024 2100)  Participates in plan/prevention/treatment measures: Elevate heels  Goal: Prevent/manage excess moisture  Outcome: Progressing  Flowsheets (Taken 2/6/2024 " 2100)  Prevent/manage excess moisture:   Cleanse incontinence/protect with barrier cream   Moisturize dry skin   Follow provider orders for dressing changes   Monitor for/manage infection if present  Goal: Prevent/minimize sheer/friction injuries  Outcome: Progressing  Flowsheets (Taken 2/6/2024 2100)  Prevent/minimize sheer/friction injuries:   HOB 30 degrees or less   Turn/reposition every 2 hours/use positioning/transfer devices   Utilize specialty bed per algorithm   Use pull sheet   Increase activity/out of bed for meals   Complete micro-shifts as needed if patient unable. Adjust patient position to relieve pressure points, not a full turn  Goal: Promote/optimize nutrition  Outcome: Progressing  Flowsheets (Taken 2/6/2024 2100)  Promote/optimize nutrition: Consume > 50% meals/supplements  Goal: Promote skin healing  Outcome: Progressing  Flowsheets (Taken 2/6/2024 2100)  Promote skin healing:   Assess skin/pad under line(s)/device(s)   Protective dressings over bony prominences   Turn/reposition every 2 hours/use positioning/transfer devices   Ensure correct size (line/device) and apply per  instructions   Rotate device position/do not position patient on device     Problem: Pain  Goal: Takes deep breaths with improved pain control throughout the shift  Outcome: Progressing  Goal: Turns in bed with improved pain control throughout the shift  Outcome: Progressing  Goal: Walks with improved pain control throughout the shift  Outcome: Progressing  Goal: Performs ADL's with improved pain control throughout shift  Outcome: Progressing  Goal: Participates in PT with improved pain control throughout the shift  Outcome: Progressing     Problem: Respiratory  Goal: Clear secretions with interventions this shift  Outcome: Progressing  Goal: Minimize anxiety/maximize coping throughout shift  Outcome: Progressing  Goal: Minimal/no exertional discomfort or dyspnea this shift  Outcome: Progressing  Goal: Patent  airway maintained this shift  Outcome: Progressing  Goal: Tolerate pulmonary toileting this shift  Outcome: Progressing  Goal: Verbalize decreased shortness of breath this shift  Outcome: Progressing  Goal: Increase self care and/or family involvement in next 24 hours  Outcome: Progressing  Goal: No signs of respiratory distress (eg. Use of accessory muscles. Peds grunting)  Outcome: Progressing  Goal: Tolerate mechanical ventilation evidenced by VS/agitation level this shift  Outcome: Progressing  Goal: Wean oxygen to maintain O2 saturation per order/standard this shift  Outcome: Progressing     Problem: Diabetes  Goal: Maintain electrolyte levels within acceptable range throughout shift  Outcome: Progressing  Goal: Maintain glucose levels >70mg/dl to <250mg/dl throughout shift  Outcome: Progressing  Goal: No changes in neurological exam by end of shift  Outcome: Progressing  Goal: Learn about and adhere to nutrition recommendations by end of shift  Outcome: Progressing  Goal: Vital signs within normal range for age by end of shift  Outcome: Progressing  Goal: Increase self care and/or family involovement by end of shift  Outcome: Progressing  Goal: Receive DSME education by end of shift  Outcome: Progressing     Problem: Discharge Planning  Goal: Discharge to home or other facility with appropriate resources  Outcome: Progressing     Problem: Chronic Conditions and Co-morbidities  Goal: Patient's chronic conditions and co-morbidity symptoms are monitored and maintained or improved  Outcome: Progressing

## 2024-02-07 NOTE — PROGRESS NOTES
Palliative Care Progress Note    Date of Admission: 1/19/2024    Patient is a 73 y.o. female admitted with Septic shock (CMS/[facility]). Intensivist request palliative to reengage with patient. Pt was denied by insurance for LTAC despite peer to peer review. Cardiology offered RHC for heart failure, pt declined, she is unlikely candidate for advanced therapy options so would not change overall prognosis. Overall prognosis is very poor. She remains in the ICU on vasopressors, tablo therapy, failure to wean from vasopressors.    Mental/Cognitive Status: awake, alert oriented x3    Respiratory Status: on nasal canula    Pain Assessment: denies    Pertinent Symptoms: denies n/v/dizziness    Diet/Nutrition: poor po intake    Bowel Regimen: senna-s    Patient's current condition/Anticipated Prognosis: poor.  Family/Healthcare Proxy involvement: pt is capable. Daughter Meka is hc-POA.    Scheduled medications  [Held by provider] apixaban, 2.5 mg, oral, BID  doxycylcine, 100 mg, oral, Daily  epoetin di or biosimilar, 10,000 Units, intravenous, Every Mon/Wed/Fri  escitalopram, 10 mg, oral, Nightly  gabapentin, 100 mg, oral, TID  heparin, 2,000 Units, intra-catheter, After Dialysis  heparin, 2,000 Units, intra-catheter, After Dialysis  heparin, 2,000 Units, intra-catheter, After Dialysis  heparin, 2,000 Units, intra-catheter, After Dialysis  insulin lispro, 0-10 Units, subcutaneous, TID with meals  ipratropium-albuteroL, 3 mL, nebulization, q6h while awake  lidocaine, 5 mL, infiltration, Once  midodrine, 15 mg, oral, TID with meals  nystatin, , Topical, BID  nystatin, , Topical, BID  pantoprazole, 40 mg, oral, Daily   Or  pantoprazole, 40 mg, intravenous, Daily  perflutren lipid microspheres, 0.5-10 mL of dilution, intravenous, Once in imaging  perflutren protein A microsphere, 0.5 mL, intravenous, Once in imaging  psyllium, 1 packet, oral, BID  sodium chloride, 3 mL, nebulization, TID  sulfur hexafluoride microsphr, 2 mL,  intravenous, Once in imaging  sulfur hexafluoride microsphr, 2 mL, intravenous, Once in imaging  vancomycin, 125 mg, oral, BID  zinc oxide, 1 Application, Topical, BID      Continuous medications  norepinephrine, 0.01-3 mcg/kg/min, Last Rate: 0.09 mcg/kg/min (02/07/24 1504)      PRN medications  PRN medications: acetaminophen, alteplase, ammonium lactate, benzocaine-menthoL, dextrose 10 % in water (D10W), dextrose, glucagon, ondansetron, oxygen, sennosides-docusate sodium     Results for orders placed or performed during the hospital encounter of 01/19/24 (from the past 24 hour(s))   POCT GLUCOSE   Result Value Ref Range    POCT Glucose 177 (H) 74 - 99 mg/dL   POCT GLUCOSE   Result Value Ref Range    POCT Glucose 186 (H) 74 - 99 mg/dL   POCT GLUCOSE   Result Value Ref Range    POCT Glucose 173 (H) 74 - 99 mg/dL   POCT GLUCOSE   Result Value Ref Range    POCT Glucose 216 (H) 74 - 99 mg/dL   Transthoracic Echo (TTE) Limited   Result Value Ref Range    MV E/A ratio 1.10     BSA 2.1 m2        Transthoracic Echo (TTE) Limited  Result Date: 2/7/2024  CONCLUSIONS:  1. Left ventricular systolic function is normal.  2. The left atrium is moderate to severely dilated.  3. There is severe mitral annular calcification.     CT chest abdomen pelvis wo IV contrast  Result Date: 1/21/2024  Chest 1.  Moderately small bilateral pleural effusions with left upper lobe atelectasis and compressive atelectasis seen posteriorly in right lower lobe. A left lower lobe pneumonia is identified. There is extensive airspace consolidation within left lower lobe with air bronchograms observed. Mild mediastinal reactive adenopathy is present as well.   Abdomen-Pelvis 1.  Cholelithiasis without evidence of cholecystitis. 2. Small amount of ascites with mild presacral edema and anasarca within the soft tissues of the abdominal wall. 3. Renal atrophy with extensive vascular calcifications.     MACRO: None   Signed by: Maria Garcia 1/21/2024 8:30 AM  Dictation workstation:   [id number]    CT head wo IV contrast  Result Date: 1/21/2024  Atrophy and chronic microvascular ischemic disease without acute intracranial process.   Signed by: Maria Garcia 1/21/2024 8:09 AM Dictation workstation:   [id number]     Vitals:    02/07/24 1730   BP: 95/82   Pulse: 102   Resp: 16   Temp:    SpO2: 95%         Physical Exam  Vitals and nursing note reviewed.   Constitutional:       General: She is not in acute distress.     Appearance: She is ill-appearing.   HENT:      Mouth/Throat:      Mouth: Mucous membranes are dry.      Pharynx: Oropharynx is clear.   Eyes:      Extraocular Movements: Extraocular movements intact.      Pupils: Pupils are equal, round, and reactive to light.   Cardiovascular:      Rate and Rhythm: Normal rate and regular rhythm.      Pulses: Normal pulses.      Heart sounds: Normal heart sounds.   Pulmonary:      Effort: Pulmonary effort is normal. No respiratory distress.      Breath sounds: Normal breath sounds. No wheezing or rales.   Abdominal:      General: Abdomen is flat. There is no distension.      Palpations: Abdomen is soft.      Tenderness: There is no abdominal tenderness. There is no guarding.   Musculoskeletal:      Right lower leg: No edema.      Left lower leg: No edema.   Skin:     General: Skin is dry.      Coloration: Skin is pale.      Findings: Bruising present.      Comments: Bruising over hands and forearms   Neurological:      General: No focal deficit present.      Mental Status: She is alert and oriented to person, place, and time.   Psychiatric:         Mood and Affect: Mood normal.         Behavior: Behavior normal.          Assessment/Plan   Septic shock (CMS/[facility])   IMP:    PROBLEM LIST:  End stage heart failure with preserved EF - on vasopressors- unable to wean. Declined [facility]  End stage renal disease on HD - mgmt per Dr. Mondragon  Cardiogenic shock - 2/2 chf end stage    Palliative Care   FULL CODE  Pt is a capable decision  maker, but makes decisions with input from daughter Meka who is also hc-POA. Discussed at length with the patient at the bedside about the end stage nature of her disease with multiple organ failure. Long discussion about GOC, code status, she wants to see her grandchildren. Palliative recommendation is to update code status to [id number] and I reviewed with her in detail the risks associated with CPR for a patient in her fragile state greatly outweigh any benefit. She still is not ready to modify code status. We also revisited conversation about hospice services. She met with them about a week ago and declined their services, however she is end stage of her disease and hospice services are appropriate at this point in time. This would allow her an opportunity to be home with family and grandchildren with additional supportive care or IPU hospice care to maximize QOL that is remaining. She declined for me to speak to her daughter Meka today. We will follow up again tomorrow and revisit conversation. Pt to remain in aggressive disease modifying mode at this time.    - Will offer patient palliation or transfer to Advanced heart failure service at [facility]  - Peer to peer done with insurance, NOT approved  - Family will update us with goals of care  - Cont Doxy and Vanc PO. Appreciate ID consult.  - Routine ICU care  - PT/OT    Advance Directives Info: Patient has advance directive, copy in chart  Discharge Planning: either needs GIP, hospice IPU or home with hospice, rehab/ins denied patient  Palliative Care Team will continue to follow patient.  Total visit time 65 minutes ACP.    Tata Vazquez, APRN-CNP

## 2024-02-07 NOTE — CARE PLAN
Pt remains on room air Spo2 93  Problem: Respiratory  Goal: Wean oxygen to maintain O2 saturation per order/standard this shift  Outcome: Met

## 2024-02-07 NOTE — PROGRESS NOTES
CONSULT PROGRESS NOTES    SERVICE DATE: 2/7/2024   SERVICE TIME: 10:39 AM    CONSULTING SERVICE: Nephrology    ASSESSMENT AND PLAN   1.  End-stage renal disease  2.  Hypotension  3.  Hyponatremia  4.  Hypokalemia  5.  Anemia of chronic kidney disease     Dialysis today with Tablo again with low temperature and using mild fluid removal.    Hyponatremia from volume overload in this patient with anuria.  Hypokalemia improved, now using 3K bath.  Hemoglobin is low, use Retacrit 10,000 units IV 3 times a week with dialysis.  She remains dependent on norepinephrine infusion otherwise her blood pressure drops dangerously low.  I have discussed this poor prognosis numerous times with the patient and her daughter.  The patient's goals remain to see her grandchildren is much as possible.  She does not seem ready for hospice care and withdrawal from dialysis.  She had been considered for LTAC for placement, but it sounds like this was denied by insurance.  She had a meeting with hospice recently, but it does not sound like she will approach this disposition.    She does not need, nor do I believe she would derive any benefit from CRRT.  Magnesium balance has improved.  I have no specific objection to keeping the arterial line out.  No objection to PICC.    Case discussed with Dr. Lemus.  Not sure she benefits or is a candidate for any kind of advanced heart failure therapy.  The patient is not interested in right heart catheterization today.  None of her options for disposition are great.  She has a poor overall prognosis, but it has been difficult to get the patient and her daughter/family to understand this.    SUBJECTIVE  INTERVAL HPI: She has no new issues or concerns.  She remains hypotensive and on the same dose of the Levophed infusion.  Dialysis yesterday with 1.5 L volume removal.    MEDICATIONS:  [Held by provider] apixaban, 2.5 mg, oral, BID  doxycylcine, 100 mg, oral, Daily  epoetin di or biosimilar, 10,000  Units, intravenous, Every Mon/Wed/Fri  escitalopram, 10 mg, oral, Nightly  gabapentin, 100 mg, oral, TID  heparin, 2,000 Units, intra-catheter, After Dialysis  heparin, 2,000 Units, intra-catheter, After Dialysis  heparin, 2,000 Units, intra-catheter, After Dialysis  heparin, 2,000 Units, intra-catheter, After Dialysis  insulin lispro, 0-10 Units, subcutaneous, TID with meals  ipratropium-albuteroL, 3 mL, nebulization, q6h while awake  lidocaine, 5 mL, infiltration, Once  midodrine, 15 mg, oral, TID with meals  nystatin, , Topical, BID  nystatin, , Topical, BID  pantoprazole, 40 mg, oral, Daily   Or  pantoprazole, 40 mg, intravenous, Daily  perflutren lipid microspheres, 0.5-10 mL of dilution, intravenous, Once in imaging  perflutren protein A microsphere, 0.5 mL, intravenous, Once in imaging  psyllium, 1 packet, oral, BID  sodium chloride, 3 mL, nebulization, TID  sulfur hexafluoride microsphr, 2 mL, intravenous, Once in imaging  sulfur hexafluoride microsphr, 2 mL, intravenous, Once in imaging  vancomycin, 125 mg, oral, BID  zinc oxide, 1 Application, Topical, BID       norepinephrine, 0.01-3 mcg/kg/min, Last Rate: 0.09 mcg/kg/min (02/07/24 0125)       PRN medications: acetaminophen, alteplase, ammonium lactate, benzocaine-menthoL, dextrose 10 % in water (D10W), dextrose, glucagon, ondansetron, oxygen, sennosides-docusate sodium     OBJECTIVE  PHYSICAL EXAM:   Heart Rate:  []   Temp:  [35.8 °C (96.4 °F)-37.1 °C (98.8 °F)]   Resp:  [13-23]   BP: ()/()   Weight:  [101 kg (223 lb 1.7 oz)]   SpO2:  [59 %-99 %]   Body mass index is 40.81 kg/m².  Chronically ill-appearing elderly white woman  Pale skin  Right-sided hand swelling  Left-sided finger amputation  Internal jugular tunneled hemodialysis catheter in place  There is no significant pretibial edema on both lower extremities  Soft abdomen  No Amos  No obvious joint deformities  Moist mucosa  Hearing seems to be intact  Phonation intact  Rectal  tube in place       DATA:   Labs:  Results for orders placed or performed during the hospital encounter of 01/19/24 (from the past 96 hour(s))   Staphylococcus Aureus/MRSA Colonization, Culture - PICC Only    Specimen: Anterior Nares; Swab   Result Value Ref Range    Staph/MRSA Screen Culture No Staphylococcus aureus isolated    POCT GLUCOSE   Result Value Ref Range    POCT Glucose 190 (H) 74 - 99 mg/dL   POCT GLUCOSE   Result Value Ref Range    POCT Glucose 152 (H) 74 - 99 mg/dL   CBC   Result Value Ref Range    WBC 14.1 (H) 4.4 - 11.3 x10*3/uL    nRBC 0.0 0.0 - 0.0 /100 WBCs    RBC 3.18 (L) 4.00 - 5.20 x10*6/uL    Hemoglobin 9.7 (L) 12.0 - 16.0 g/dL    Hematocrit 30.2 (L) 36.0 - 46.0 %    MCV 95 80 - 100 fL    MCH 30.5 26.0 - 34.0 pg    MCHC 32.1 32.0 - 36.0 g/dL    RDW 24.0 (H) 11.5 - 14.5 %    Platelets 243 150 - 450 x10*3/uL   Comprehensive metabolic panel   Result Value Ref Range    Glucose 164 (H) 65 - 99 mg/dL    Sodium 128 (L) 133 - 145 mmol/L    Potassium 4.3 3.4 - 5.1 mmol/L    Chloride 95 (L) 97 - 107 mmol/L    Bicarbonate 18 (L) 24 - 31 mmol/L    Urea Nitrogen 16 8 - 25 mg/dL    Creatinine 2.70 (H) 0.40 - 1.60 mg/dL    eGFR 18 (L) >60 mL/min/1.73m*2    Calcium 8.7 8.5 - 10.4 mg/dL    Albumin 2.7 (L) 3.5 - 5.0 g/dL    Alkaline Phosphatase 223 (H) 35 - 125 U/L    Total Protein 5.7 (L) 5.9 - 7.9 g/dL    AST 22 5 - 40 U/L    Bilirubin, Total 1.1 0.1 - 1.2 mg/dL    ALT 11 5 - 40 U/L    Anion Gap 15 <=19 mmol/L   Magnesium   Result Value Ref Range    Magnesium 1.90 1.60 - 3.10 mg/dL   POCT GLUCOSE   Result Value Ref Range    POCT Glucose 151 (H) 74 - 99 mg/dL   POCT GLUCOSE   Result Value Ref Range    POCT Glucose 166 (H) 74 - 99 mg/dL   POCT GLUCOSE   Result Value Ref Range    POCT Glucose 168 (H) 74 - 99 mg/dL   CBC   Result Value Ref Range    WBC 11.7 (H) 4.4 - 11.3 x10*3/uL    nRBC 0.0 0.0 - 0.0 /100 WBCs    RBC 3.13 (L) 4.00 - 5.20 x10*6/uL    Hemoglobin 9.5 (L) 12.0 - 16.0 g/dL    Hematocrit 31.4 (L)  36.0 - 46.0 %     80 - 100 fL    MCH 30.4 26.0 - 34.0 pg    MCHC 30.3 (L) 32.0 - 36.0 g/dL    RDW 24.1 (H) 11.5 - 14.5 %    Platelets 259 150 - 450 x10*3/uL   Comprehensive metabolic panel   Result Value Ref Range    Glucose 192 (H) 65 - 99 mg/dL    Sodium 128 (L) 133 - 145 mmol/L    Potassium 4.9 3.4 - 5.1 mmol/L    Chloride 97 97 - 107 mmol/L    Bicarbonate 16 (L) 24 - 31 mmol/L    Urea Nitrogen 21 8 - 25 mg/dL    Creatinine 3.40 (H) 0.40 - 1.60 mg/dL    eGFR 14 (L) >60 mL/min/1.73m*2    Calcium 8.5 8.5 - 10.4 mg/dL    Albumin 2.4 (L) 3.5 - 5.0 g/dL    Alkaline Phosphatase 234 (H) 35 - 125 U/L    Total Protein 5.6 (L) 5.9 - 7.9 g/dL    AST 29 5 - 40 U/L    Bilirubin, Total 0.8 0.1 - 1.2 mg/dL    ALT 12 5 - 40 U/L    Anion Gap 15 <=19 mmol/L   Magnesium   Result Value Ref Range    Magnesium 2.10 1.60 - 3.10 mg/dL   POCT GLUCOSE   Result Value Ref Range    POCT Glucose 178 (H) 74 - 99 mg/dL   POCT GLUCOSE   Result Value Ref Range    POCT Glucose 196 (H) 74 - 99 mg/dL   POCT GLUCOSE   Result Value Ref Range    POCT Glucose 132 (H) 74 - 99 mg/dL   POCT GLUCOSE   Result Value Ref Range    POCT Glucose 187 (H) 74 - 99 mg/dL   CBC   Result Value Ref Range    WBC 11.1 4.4 - 11.3 x10*3/uL    nRBC 0.0 0.0 - 0.0 /100 WBCs    RBC 2.91 (L) 4.00 - 5.20 x10*6/uL    Hemoglobin 8.8 (L) 12.0 - 16.0 g/dL    Hematocrit 26.4 (L) 36.0 - 46.0 %    MCV 91 80 - 100 fL    MCH 30.2 26.0 - 34.0 pg    MCHC 33.3 32.0 - 36.0 g/dL    RDW 22.7 (H) 11.5 - 14.5 %    Platelets 169 150 - 450 x10*3/uL   Comprehensive metabolic panel   Result Value Ref Range    Glucose 188 (H) 65 - 99 mg/dL    Sodium 133 133 - 145 mmol/L    Potassium 4.1 3.4 - 5.1 mmol/L    Chloride 99 97 - 107 mmol/L    Bicarbonate 21 (L) 24 - 31 mmol/L    Urea Nitrogen 13 8 - 25 mg/dL    Creatinine 2.40 (H) 0.40 - 1.60 mg/dL    eGFR 21 (L) >60 mL/min/1.73m*2    Calcium 8.5 8.5 - 10.4 mg/dL    Albumin 2.5 (L) 3.5 - 5.0 g/dL    Alkaline Phosphatase 353 (H) 35 - 125 U/L    Total  Protein 5.7 (L) 5.9 - 7.9 g/dL    AST 66 (H) 5 - 40 U/L    Bilirubin, Total 0.9 0.1 - 1.2 mg/dL    ALT 20 5 - 40 U/L    Anion Gap 13 <=19 mmol/L   Magnesium   Result Value Ref Range    Magnesium 1.70 1.60 - 3.10 mg/dL   POCT GLUCOSE   Result Value Ref Range    POCT Glucose 162 (H) 74 - 99 mg/dL   POCT GLUCOSE   Result Value Ref Range    POCT Glucose 147 (H) 74 - 99 mg/dL   Transthoracic Echo (TTE) Limited   Result Value Ref Range    AV pk david 1.15 m/s    LVOT diam 1.90 cm    LV biplane EF 68 %    LA vol index A/L 31.3 ml/m2    LVIDd 2.98 cm    AV pk grad 5.3 mmHg    Aortic Valve Area by Continuity of Peak Velocity 2.74 cm2    LV A4C EF 68.2    POCT GLUCOSE   Result Value Ref Range    POCT Glucose 224 (H) 74 - 99 mg/dL   POCT GLUCOSE   Result Value Ref Range    POCT Glucose 177 (H) 74 - 99 mg/dL   POCT GLUCOSE   Result Value Ref Range    POCT Glucose 186 (H) 74 - 99 mg/dL         SIGNATURE: Saeed Mondragon MD PATIENT NAME: Ayanna Gross   DATE: February 7, 2024 MRN: [id number]   TIME: 10:39 AM PAGER: [phone]

## 2024-02-07 NOTE — PROGRESS NOTES
Wound Care Progress Note     Visit Date: 2/7/2024      Patient Name: Ayanna Gross         MRN: [id number]                Reason for Visit: followup       Wound History: Present on admission. Patient with Moisture Associated Skin Damage to Sacrum and buttock- improved, Chronic Unstagable ulcers to bilateral heels and left posterior ankle, Deep tissue injury to Left great and 3rd toe-stable. Right Mccall scabs stable.    A 73 y.o. year old female admitted for Principal Problem:    Septic shock (CMS/[facility])  Active Problems:    Pneumonia    Low blood pressure    End stage renal disease (CMS/[facility])    Anemia due to blood loss, acute      Past Medical History:   Diagnosis Date    Asthma     CAD (coronary artery disease)     CHF (congestive heart failure) (CMS/[facility])     Chronic kidney disease on chronic dialysis (CMS/[facility])     Hypertension     Personal history of diseases of the blood and blood-forming organs and certain disorders involving the immune mechanism     History of autoimmune disorder    Sleep apnea     Type 2 diabetes mellitus without complications (CMS/[facility]) 09/15/2022    Diabetes mellitus      Past Surgical History:   Procedure Laterality Date    CARPAL TUNNEL RELEASE  11/18/2013    Neuroplasty Decompression Median Nerve At Carpal Tunnel    HAND SURGERY  11/18/2013    Hand Surgery                                                                                                                                                          MASTECTOMY, PARTIAL  04/10/2014    Right Breast Partial Mastectomy    MR HEAD ANGIO WO IV CONTRAST  8/29/2023    MR HEAD ANGIO WO IV CONTRAST [facility] INPATIENT LEGACY    OTHER SURGICAL HISTORY  11/18/2013    Breast Reconstruction With Implant Prosthesis    OTHER SURGICAL HISTORY  11/18/2013    Thyroid Surgery Substernal Thyroidectomy Partial    OTHER SURGICAL HISTORY  11/18/2013    Modified Radical Mastectomy Left Breast    OTHER SURGICAL HISTORY  04/04/2022    Carpal tunnel surgery    OTHER  SURGICAL HISTORY  04/04/2022    Foot surgery    OTHER SURGICAL HISTORY  04/04/2022    Hernia repair    SENTINEL LYMPH NODE BIOPSY  04/10/2014    [place] Lymph Node Biopsy    TONSILLECTOMY  11/18/2013    Tonsillectomy    UMBILICAL HERNIA REPAIR  11/18/2013    Umbilical Hernia Repair    US GUIDED PERCUTANEOUS PLACEMENT  6/29/2023    US GUIDED PERCUTANEOUS PLACEMENT [facility] INPATIENT LEGACY       Scheduled medications  [Held by provider] apixaban, 2.5 mg, oral, BID  doxycylcine, 100 mg, oral, Daily  epoetin di or biosimilar, 10,000 Units, intravenous, Every Mon/Wed/Fri  escitalopram, 10 mg, oral, Nightly  gabapentin, 100 mg, oral, TID  heparin, 2,000 Units, intra-catheter, After Dialysis  heparin, 2,000 Units, intra-catheter, After Dialysis  heparin, 2,000 Units, intra-catheter, After Dialysis  heparin, 2,000 Units, intra-catheter, After Dialysis  insulin lispro, 0-10 Units, subcutaneous, TID with meals  ipratropium-albuteroL, 3 mL, nebulization, q6h while awake  lidocaine, 5 mL, infiltration, Once  midodrine, 15 mg, oral, TID with meals  nystatin, , Topical, BID  nystatin, , Topical, BID  pantoprazole, 40 mg, oral, Daily   Or  pantoprazole, 40 mg, intravenous, Daily  perflutren lipid microspheres, 0.5-10 mL of dilution, intravenous, Once in imaging  perflutren protein A microsphere, 0.5 mL, intravenous, Once in imaging  psyllium, 1 packet, oral, BID  sodium chloride, 3 mL, nebulization, TID  sulfur hexafluoride microsphr, 2 mL, intravenous, Once in imaging  sulfur hexafluoride microsphr, 2 mL, intravenous, Once in imaging  vancomycin, 125 mg, oral, BID  zinc oxide, 1 Application, Topical, BID      Continuous medications  norepinephrine, 0.01-3 mcg/kg/min, Last Rate: 0.09 mcg/kg/min (02/07/24 0125)      PRN medications  PRN medications: acetaminophen, alteplase, ammonium lactate, benzocaine-menthoL, dextrose 10 % in water (D10W), dextrose, glucagon, ondansetron, oxygen, sennosides-docusate sodium    No Known  Allergies         Pertinent Labs:   [place], Urine   Date Value Ref Range Status   02/24/2019 1,315 (H) 0 - 23 MG/L Final     Comment:     RESULT CHECKED     Albumin   Date Value Ref Range Status   02/06/2024 2.5 (L) 3.5 - 5.0 g/dL Final   01/02/2024 2.8 (L) 3.4 - 5.0 g/dL Final     Albumin, SPE   Date Value Ref Range Status   02/26/2019 3.22  Reference range: 3.37 to 4.23  Unit: gm/dL   (L)  Final     Albumin/Protein Total, Ur   Date Value Ref Range Status   02/24/2019 77.5  Unit: %    Final       Rectal Tube With balloon (Active)   Placement Date/Time: 01/19/24 1830   Placed by: Peri Rosario RN  Hand Hygiene Completed: Yes  Type: With balloon   Number of days: 18     Rectal Tube With balloon (Active)   Rectal Tube Output 100 mL 02/07/24 0400       Wound 12/06/23 Diabetic Ulcer Heel Left (Active)   Date First Assessed/Time First Assessed: 12/06/23 2200   Present on Original Admission: Yes  Hand Hygiene Completed: Yes  Primary Wound Type: Diabetic Ulcer  Location: Heel  Wound Location Orientation: Left   Number of days: 62       Wound 12/06/23 Diabetic Ulcer Heel Right (Active)   Date First Assessed/Time First Assessed: 12/06/23 2200   Present on Original Admission: Yes  Hand Hygiene Completed: Yes  Primary Wound Type: Diabetic Ulcer  Location: Heel  Wound Location Orientation: Right   Number of days: 62       Wound 12/06/23 Pressure Injury Buttocks Bilateral (Active)   Date First Assessed/Time First Assessed: 12/06/23 2200   Present on Original Admission: Yes  Hand Hygiene Completed: Yes  Primary Wound Type: Pressure Injury  Location: Buttocks  Wound Location Orientation: Bilateral   Number of days: 62       Wound 12/06/23 Other (comment) Groin Bilateral (Active)   Date First Assessed/Time First Assessed: 12/06/23 2200   Present on Original Admission: Yes  Primary Wound Type: (c) Other (comment)  Location: (c) Groin  Wound Location Orientation: Bilateral   Number of days: 62       Wound 12/20/23 Skin Tear Hand  Dorsal;Left (Active)   Date First Assessed/Time First Assessed: 12/20/23 1140   Present on Original Admission: No  Hand Hygiene Completed: Yes  Primary Wound Type: Skin Tear  Location: Hand  Wound Location Orientation: Dorsal;Left   Number of days: 48       Wound 01/20/24 Skin Tear Pretibial Right (Active)   Date First Assessed/Time First Assessed: 01/20/24 2000   Present on Original Admission: Yes  Primary Wound Type: Skin Tear  Location: Pretibial  Wound Location Orientation: Right   Number of days: 17       Wound 01/21/24 Pressure Injury Toe (Comment  which one) Left (Active)   Date First Assessed/Time First Assessed: 01/21/24 0309   Present on Original Admission: Yes  Primary Wound Type: Pressure Injury  Location: (c) Toe (Comment  which one)  Wound Location Orientation: Left   Number of days: 17       Wound 01/19/24 Diabetic Ulcer Ankle Dorsal;Left (Active)   Date First Assessed: 01/19/24   Present on Original Admission: Yes  Primary Wound Type: Diabetic Ulcer  Location: Ankle  Wound Location Orientation: Dorsal;Left   Number of days: 19       Wound 01/24/24 Other (comment) Ankle Left;Anterior (Active)   Date First Assessed/Time First Assessed: 01/24/24 0600   Hand Hygiene Completed: Yes  Primary Wound Type: (c) Other (comment)  Location: Ankle  Wound Location Orientation: Left;Anterior   Number of days: 14       Wound 01/31/24 Pressure Injury Left;Lateral (Active)   Date First Assessed/Time First Assessed: 01/31/24 1047   Present on Original Admission: No  Primary Wound Type: Pressure Injury  Wound Location Orientation: (c) Left;Lateral   Number of days: 6     Wound 12/06/23 Diabetic Ulcer Heel Left (Active)   Wound Image   02/07/24 0900   Site Assessment Black;Eschar;Sloughing 02/06/24 0400   Judy-Wound Assessment Dry;Fragile 01/24/24 0912   Wound Length (cm) 5.6 cm 01/24/24 0400   Wound Width (cm) 5.2 cm 01/24/24 0400   Wound Surface Area (cm^2) 29.12 cm^2 01/24/24 0400   Wound Depth (cm) 0 cm 01/24/24 0400    Wound Volume (cm^3) 0 cm^3 01/24/24 0400   State of Healing Non-healing;Eschar 01/23/24 0934   Drainage Description Serous 02/05/24 0400   Drainage Amount Scant 02/05/24 0400   Dressing ABD;Kerlix/rolled gauze 02/06/24 0400   Dressing Changed Other (Comment) 02/06/24 2000   Dressing Status Clean;Dry 02/07/24 0400   Dressing Status Clean;Dry;Intact 01/24/24 0912       Wound 12/06/23 Diabetic Ulcer Heel Right (Active)   Wound Image   02/07/24 0900   Site Assessment Black;Eschar;Sloughing 02/06/24 0400   Judy-Wound Assessment Red 01/24/24 0912   Non-staged Wound Description Full thickness 01/24/24 0400   Wound Length (cm) 6.5 cm 01/24/24 0400   Wound Width (cm) 3.5 cm 01/24/24 0400   Wound Surface Area (cm^2) 22.75 cm^2 01/24/24 0400   Wound Depth (cm) 0.1 cm 01/24/24 0400   Wound Volume (cm^3) 2.275 cm^3 01/24/24 0400   Wound Healing % 0 01/24/24 0400   State of Healing Non-healing;Eschar 01/23/24 0946   Drainage Description None 01/29/24 2000   Drainage Amount None 01/24/24 0912   Dressing ABD;Kerlix/rolled gauze;Other (Comment) 02/06/24 0400   Dressing Changed Other (Comment) 02/06/24 2000   Dressing Status Clean;Dry 02/07/24 0400   Dressing Status Clean;Dry;Intact 01/25/24 0847       Wound 12/06/23 Pressure Injury Buttocks Bilateral (Active)   Wound Image   02/07/24 0900   Site Assessment Excoriated 02/07/24 0400   Judy-Wound Assessment Fragile 01/30/24 1000   Non-staged Wound Description Partial thickness 01/24/24 0400   Wound Length (cm) 27.5 cm 01/24/24 0400   Wound Width (cm) 15 cm 01/24/24 0400   Wound Surface Area (cm^2) 412.5 cm^2 01/24/24 0400   Wound Depth (cm) 0 cm 01/24/24 0400   Wound Volume (cm^3) 0 cm^3 01/24/24 0400   Drainage Description Serosanguineous 01/24/24 0912   Drainage Amount None 01/30/24 1000   Dressing Moisture barrier;Open to air 02/07/24 0400   Dressing Changed Changed 01/31/24 1040   Dressing Status Clean;Dry 02/05/24 0800   Dressing Status Clean;Dry 02/02/24 0800       Wound  12/06/23 Other (comment) Groin Bilateral (Active)   Wound Image   02/07/24 0900   Site Assessment Red 02/05/24 0800   Judy-Wound Assessment Excoriated 02/06/24 0400   Non-staged Wound Description Not applicable 01/26/24 0606   Shape blotchy 01/23/24 2000   Drainage Description None 01/30/24 1028   Drainage Amount None 01/30/24 1028   Dressing Open to air 02/07/24 0400   Dressing Changed Changed 02/03/24 2247   Dressing Status Dry;Clean 02/01/24 2000       Wound 12/20/23 Skin Tear Hand Dorsal;Left (Active)   Wound Image   02/07/24 0900   Site Assessment Red 02/07/24 0400   Judy-Wound Assessment Ecchymotic 01/29/24 2000   Non-staged Wound Description Not applicable 01/24/24 0912   Wound Length (cm) 2 cm 01/24/24 0400   Wound Width (cm) 1 cm 01/24/24 0400   Wound Surface Area (cm^2) 2 cm^2 01/24/24 0400   Wound Depth (cm) 0.1 cm 01/24/24 0400   Wound Volume (cm^3) 0.2 cm^3 01/24/24 0400   State of Healing Epithelialized 01/22/24 0400   Drainage Description Serosanguineous 01/29/24 2000   Drainage Amount None 02/01/24 0830   Dressing Other (Comment) 02/06/24 0400   Dressing Changed Changed 01/23/24 1900   Dressing Status Clean;Dry 02/07/24 0400       Wound 01/20/24 Skin Tear Pretibial Right (Active)   Wound Image   02/07/24 0900   Site Assessment Black;Dry 02/07/24 0400   Judy-Wound Assessment Fragile;Dry 01/29/24 2000   Non-staged Wound Description Not applicable 01/29/24 2000   Shape irregular 01/23/24 2000   Wound Length (cm) 5.5 cm 01/24/24 0400   Wound Width (cm) 1 cm 01/24/24 0400   Wound Surface Area (cm^2) 5.5 cm^2 01/24/24 0400   Wound Depth (cm) 0 cm 01/24/24 0400   Wound Volume (cm^3) 0 cm^3 01/24/24 0400   Margins Well-defined edges 01/29/24 2000   Drainage Description None 01/30/24 1000   Drainage Amount None 01/30/24 1000   Dressing Open to air 02/07/24 0400   Dressing Changed Changed 02/01/24 2000   Dressing Status Clean;Occlusive 02/02/24 0800       Wound 01/21/24 Pressure Injury Toe (Comment  which one)  Left (Active)   Wound Image   02/07/24 0900   Site Assessment Black;Dry 02/07/24 0400   Judy-Wound Assessment Clean;Dry;Intact 01/30/24 1000   Non-staged Wound Description Not applicable 01/24/24 0912   Pressure Injury Stage DTPI 01/23/24 2000   Shape [place] 01/23/24 2000   Wound Length (cm) 0.5 cm 01/24/24 0400   Wound Width (cm) 0.5 cm 01/24/24 0400   Wound Surface Area (cm^2) 0.25 cm^2 01/24/24 0400   Wound Depth (cm) 0 cm 01/24/24 0400   Wound Volume (cm^3) 0 cm^3 01/24/24 0400   Margins Attached edges 01/24/24 0912   Drainage Description None 01/30/24 1000   Drainage Amount None 01/30/24 1000   Dressing Open to air 02/07/24 0400   Dressing Changed Changed 02/03/24 2247   Dressing Status Clean;Dry 02/02/24 0800       Wound 01/19/24 Diabetic Ulcer Ankle Dorsal;Left (Active)   Wound Image   01/31/24 1041   Site Assessment Black;Eschar 02/06/24 1900   Judy-Wound Assessment Fragile;Red 01/30/24 1000   Wound Length (cm) 4 cm 01/24/24 0400   Wound Width (cm) 1.8 cm 01/24/24 0400   Wound Surface Area (cm^2) 7.2 cm^2 01/24/24 0400   Wound Depth (cm) 0.2 cm 01/24/24 0400   Wound Volume (cm^3) 1.44 cm^3 01/24/24 0400   State of Healing Non-healing;Eschar 01/23/24 0934   Drainage Description Serous 02/05/24 0400   Drainage Amount Small 02/05/24 0400   Dressing ABD;Kerlix/rolled gauze;Moisture barrier 02/06/24 1900   Dressing Changed Changed 02/06/24 1120   Dressing Status Clean;Dry 02/07/24 0400       Wound 01/31/24 Pressure Injury Left;Lateral (Active)   Wound Image   01/31/24 1048   Site Assessment Black;Eschar 02/06/24 0745   Dressing ABD;Kerlix/rolled gauze 02/06/24 1120   Dressing Changed Changed 02/06/24 1120   Dressing Status Clean;Dry 02/07/24 0400       Exam conducted on day 18 of stay with knowledge of Floor Nurse. Introductions made to patient. On exam patient lying in bed, Fecal matter system in place. Feet elevated on pillow, dressings clean, dry, intact. Updated wound photos taken this morning. Bilateral heel  eschar slowly coming off leaving red woundbed. Continue Betadine gauze and offloading daily. Dr. Mora aware. Skin with generalized dryness, recommend Ammonium Lactate Lotion. Left toe DTIs stable. Sacrum/Buttock improving, no longer has excoriation,generalized redness. Continue Nystatin, zinc cream, and offloading daily. Bruising to Bilateral upper extremities improved. Groin/periarea redness improved. Patient on Envision [id number] low airloss mattress. Catarina Reynolds updated, to continue pressure injury prevention interventions, Woundcare, and nursing to continue to follow providers orders. Reconsult Wound RN PRN. Diamond WAYNEN RN       Wound Team Plan: Bilateral heels/ankle-Apply betadine gauze, abd, kerlix, daily and prn. Continue to offload.  Groin- Cleanse gently with pH balanced wipes, apply Mixture of Calazime cream and Nystatin powder daily and prn.   Buttock- Cleanse gently with pH balanced wipes, apply mixture or Triad or Calazime cream and Nystatin powder, Cover with border dressing daily and prn. Continue to offload.    Diamond Argutea, FLAQUITO  2/7/2024  10:26 AM

## 2024-02-07 NOTE — CARE PLAN
Problem: Respiratory  Goal: Clear secretions with interventions this shift  Outcome: Progressing  Goal: Minimize anxiety/maximize coping throughout shift  Outcome: Progressing  Goal: Minimal/no exertional discomfort or dyspnea this shift  Outcome: Progressing  Goal: Patent airway maintained this shift  Outcome: Progressing  Goal: Tolerate pulmonary toileting this shift  Outcome: Progressing  Goal: Verbalize decreased shortness of breath this shift  Outcome: Progressing  Goal: Increase self care and/or family involvement in next 24 hours  Outcome: Progressing  Goal: No signs of respiratory distress (eg. Use of accessory muscles. Peds grunting)  Outcome: Progressing  Goal: Tolerate mechanical ventilation evidenced by VS/agitation level this shift  Outcome: Progressing  Goal: Wean oxygen to maintain O2 saturation per order/standard this shift  Outcome: Progressing

## 2024-02-07 NOTE — NURSING NOTE
Patient with Rt arm dual lumen picc, red lumen in use without difficulty, purple lumen flushes easily and with positive blood return, curos cap applied. Dressing D&I, Rt chest dialysis catheters, dressing D&I

## 2024-02-07 NOTE — PROGRESS NOTES
Ayanna Gross is a 73 y.o. female on day 19 of admission presenting with Septic shock (CMS/[facility]).    Critical Care Medicine Progress Note    Admitted on:     1/19/2024  Length of Stay: 19 day(s)     Interval History     Still on levo 0.08  No other complaints  Did not want to get in a chair.    Objective   Objective     Vitals:    02/07/24 0420   Weight: 101 kg (223 lb 1.7 oz)   Body mass index is 40.81 kg/m².        2/7/2024     7:00 AM 2/7/2024     9:30 AM 2/7/2024    10:00 AM 2/7/2024    10:30 AM 2/7/2024    11:00 AM 2/7/2024    12:20 PM 2/7/2024     1:00 PM   Vitals   Systolic 95 93 96 112  95 94   Diastolic 42 46 46 85  55 55   Heart Rate 99 99 99 100  100 98   Temp 35.8 °C (96.4 °F)    36.4 °C (97.5 °F)     Resp 15 17 17 31  20 16        Vent settings:  FiO2 (%):  [28 %] 28 %    Intake/Output Summary (Last 24 hours) at 2/7/2024 1419  Last data filed at 2/7/2024 1200  Gross per 24 hour   Intake 738.21 ml   Output 300 ml   Net 438.21 ml     Physical exam:  -----------------  Awake and oriented to place and person  Cardiovascular:      Rate and Rhythm: Normal rate. Rhythm irregular.   Pulmonary:      Effort: Pulmonary effort is normal.   Abdominal:      General: Abdomen is flat.   Musculoskeletal:         General: Deformity present. Normal range of motion.      Cervical back: Normal range of motion.      Comments: Abscence of left hand first finger   Skin:     General: Skin is warm.      Capillary Refill: Capillary refill takes less than 2 seconds.      Comments: Skin tear healing to L hand  Wound to bilateral feet and sacrum     Medications     Scheduled Medications:   [Held by provider] apixaban, 2.5 mg, oral, BID  doxycylcine, 100 mg, oral, Daily  epoetin di or biosimilar, 10,000 Units, intravenous, Every Mon/Wed/Fri  escitalopram, 10 mg, oral, Nightly  gabapentin, 100 mg, oral, TID  heparin, 2,000 Units, intra-catheter, After Dialysis  heparin, 2,000 Units, intra-catheter, After Dialysis  heparin, 2,000  Units, intra-catheter, After Dialysis  heparin, 2,000 Units, intra-catheter, After Dialysis  insulin lispro, 0-10 Units, subcutaneous, TID with meals  ipratropium-albuteroL, 3 mL, nebulization, q6h while awake  lidocaine, 5 mL, infiltration, Once  midodrine, 15 mg, oral, TID with meals  nystatin, , Topical, BID  nystatin, , Topical, BID  pantoprazole, 40 mg, oral, Daily   Or  pantoprazole, 40 mg, intravenous, Daily  perflutren lipid microspheres, 0.5-10 mL of dilution, intravenous, Once in imaging  perflutren protein A microsphere, 0.5 mL, intravenous, Once in imaging  psyllium, 1 packet, oral, BID  sodium chloride, 3 mL, nebulization, TID  sulfur hexafluoride microsphr, 2 mL, intravenous, Once in imaging  sulfur hexafluoride microsphr, 2 mL, intravenous, Once in imaging  vancomycin, 125 mg, oral, BID  zinc oxide, 1 Application, Topical, BID       Continuous Medications:   norepinephrine, 0.01-3 mcg/kg/min, Last Rate: 0.09 mcg/kg/min (02/07/24 1039)       PRN Medications:     Labs     Results from last 72 hours   Lab Units 02/06/24  0454 02/05/24  0506   GLUCOSE mg/dL 188* 192*   SODIUM mmol/L 133 128*   POTASSIUM mmol/L 4.1 4.9   CHLORIDE mmol/L 99 97   CO2 mmol/L 21* 16*   BUN mg/dL 13 21   CREATININE mg/dL 2.40* 3.40*   EGFR mL/min/1.73m*2 21* 14*   CALCIUM mg/dL 8.5 8.5   ALBUMIN g/dL 2.5* 2.4*   MAGNESIUM mg/dL 1.70 2.10     Results from last 72 hours   Lab Units 02/06/24  0454 02/05/24  0506   ALK PHOS U/L 353* 234*   ALT U/L 20 12   AST U/L 66* 29   BILIRUBIN TOTAL mg/dL 0.9 0.8   PROTEIN TOTAL g/dL 5.7* 5.6*             Results from last 72 hours   Lab Units 02/06/24  0454 02/05/24  0506   WBC AUTO x10*3/uL 11.1 11.7*   NRBC AUTO /100 WBCs 0.0 0.0   RBC AUTO x10*6/uL 2.91* 3.13*   HEMOGLOBIN g/dL 8.8* 9.5*   HEMATOCRIT % 26.4* 31.4*   MCV fL 91 100   MCH pg 30.2 30.4   MCHC g/dL 33.3 30.3*   RDW % 22.7* 24.1*   PLATELETS AUTO x10*3/uL 169 259         Lab Results   Component Value Date    BLOODCULT No growth  at 4 days -  FINAL REPORT 01/19/2024    BLOODCULT No growth at 4 days -  FINAL REPORT 01/19/2024    GRAMSTAIN (A) 12/15/2023     Gram stain indicates specimen contains significant salivary contamination.     Lab Results   Component Value Date    URINECULTURE >100,000 Proteus mirabilis (A) 01/19/2024       Imaging and Diagnostic Studies     Lab/Radiology/Diagnostic Review:  Results for orders placed or performed during the hospital encounter of 01/19/24 (from the past 24 hour(s))   Transthoracic Echo (TTE) Limited   Result Value Ref Range    AV pk david 1.15 m/s    LVOT diam 1.90 cm    LV biplane EF 68 %    LA vol index A/L 31.3 ml/m2    LVIDd 2.98 cm    AV pk grad 5.3 mmHg    Aortic Valve Area by Continuity of Peak Velocity 2.74 cm2    LV A4C EF 68.2    POCT GLUCOSE   Result Value Ref Range    POCT Glucose 224 (H) 74 - 99 mg/dL   POCT GLUCOSE   Result Value Ref Range    POCT Glucose 177 (H) 74 - 99 mg/dL   POCT GLUCOSE   Result Value Ref Range    POCT Glucose 186 (H) 74 - 99 mg/dL   POCT GLUCOSE   Result Value Ref Range    POCT Glucose 173 (H) 74 - 99 mg/dL     Upper extremity venous duplex bilateral    Result Date: 1/25/2024           [place] [address]            Phone [phone]  Vascular Lab Report  [facility] UPPER EXTREMITY VENOUS DUPLEX BILATERAL Patient Name:      BASIA Gray Physician:  [id number] Mini Busby MD, RPVI Study Date:        1/25/2024           Ordering Provider:  [id number] ERI BERMAN MRN/PID:           [id number]            Fellow: Accession#:        [id number]        Technologist:       Dara Parr RVRICHIE Date of Birth/Age: 1950 / 73 years Technologist 2: Gender:            F                   Encounter#:         [id number] Admission Status:  Inpatient           Location Performed: [facility]  Diagnosis/ICD: Right arm swelling-M79.89; Left arm swelling-M79.89  CPT Codes:     30945 Peripheral venous duplex scan for DVT complete  CONCLUSIONS:  Right Upper Venous: No evidence of acute deep vein thrombus visualized in the right upper extremity. Internal jugular vein was visualized in segments due to IV lines and bandages. Left Upper Venous: No evidence of acute deep vein thrombus visualized in the left upper extremity. Subclavian stent is noted and appears patent. There is a known occluded dialysis access noted.  Additional Findings: Technically difficult exam due to IV lines, bandages, and patient's positioning.  Imaging & Doppler Findings:  Right               Compressible Thrombus        Flow Internal Jugular        Yes        None   Spontaneous/Phasic Subclavian              Yes        None Subclavian Proximal     Yes        None   Spontaneous/Phasic Subclavian Mid          Yes        None Subclavian Distal       Yes        None   Spontaneous/Phasic Axillary                Yes        None       Pulsatile Brachial                Yes        None Cephalic                Yes        None Basilic                 Yes        None  Left                Compress Thrombus        Flow Internal Jugular      Yes      None       Pulsatile Subclavian            Yes      None Subclavian Proximal   Yes      None       Pulsatile Subclavian Mid        Yes      None       Pulsatile Subclavian Distal     Yes      None       Pulsatile Axillary              Yes      None   Spontaneous/Phasic Brachial              Yes      None Cephalic              Yes      None Basilic               Yes      None  [id number] Mini Busby MD, RPVI Electronically signed by [id number] Mini Busby MD, RPVI on 1/25/2024 at 4:06:13 PM  ** Final **     ECG 12 lead    Result Date: 1/24/2024  Sinus tachycardia  with 1st degree AV block Right bundle branch block Possible Lateral infarct , age undetermined Abnormal ECG Confirmed by Yesika Nj ([id number]) on 1/24/2024 6:54:45 AM    XR tibia fibula right 2 views    Result Date:  1/22/2024  Interpreted By:  Real Mendieta, STUDY: XR TIBIA FIBULA RIGHT 2 VIEWS; 1/22/2024 2:15 pm   INDICATION: Signs/Symptoms:evaluate source of infection, osteomyelitis;   COMPARISON: None available.   ACCESSION NUMBER(S): [id number]   ORDERING CLINICIAN: BAN GIFFORD   TECHNIQUE: Views: AP and Lateral of the right tibia and fibula   FINDINGS: RESULT: There is no evidence for fracture or dislocation. Tricompartmental degenerative changes of the right knee. The tibia and fibula appear intact without bony erosion or evidence for osteomyelitis. No other bony or soft tissue abnormality is identified. No subcutaneous gas is noted.       No evidence for acute osseous abnormality. No bony erosion to suggest osteomyelitis.   Signed by: Real Mendieta 1/22/2024 3:15 PM Dictation workstation:   [id number]    XR ankle right 3+ views    Result Date: 1/22/2024  Interpreted By:  Real Mendieta, STUDY: XR ANKLE RIGHT 3+ VIEWS; 1/22/2024 2:15 pm   INDICATION: Signs/Symptoms:Evuluate source of infection, osteomyelitis;   COMPARISON: None available.   ACCESSION NUMBER(S): [id number]   ORDERING CLINICIAN: BAN GIFFORD   TECHNIQUE: Views: AP, Lateral, Oblique, right ankle   FINDINGS: RESULT: There is no evidence for fracture or dislocation. The ankle mortise is intact. Joint spaces appear adequately maintained. No bony erosion to suggest osteomyelitis. No bone lesion or soft tissue abnormality is identified. No subcutaneous gas is seen.       No evidence for acute osseous abnormality. No bony erosion to suggest osteomyelitis.   Signed by: Real Mendieta 1/22/2024 3:14 PM Dictation workstation:   [id number]    Transthoracic Echo (TTE) Complete    Result Date: 1/22/2024           [place] [address]            Phone [phone] TRANSTHORACIC ECHOCARDIOGRAM REPORT  Patient Name:      BASIA Gray Physician:   [id number] Fausto Rowe DO Study Date:        1/22/2024            Ordering Provider:   [id number] ERI BERMAN MRN/PID:           [id number]            Fellow: Accession#:        [id number]        Nurse: Date of Birth/Age: 1950 / 73 years Sonographer:         Tabatha Page RDCS Gender:            F                   Additional Staff: Height:            157.00 cm           Admit Date: Weight:            75.00 kg            Admission Status:    Inpatient - Routine BSA:               1.76 m2             Department Location: [facility] Blood Pressure: 88 /49 mmHg Study Type:    TRANSTHORACIC ECHO (TTE) COMPLETE Diagnosis/ICD: Chronic combined systolic (congestive) and diastolic (congestive)                heart failure (CHF)-I50.42; Sepsis, unspecified organism-A41.9 Indication:    heart failure,septic shock CPT Codes:     Echo Complete w Full Doppler-29636 Patient History: BMI:               Obese >30 Pertinent History: Sepsis, PVD, A-Fib, CVA, Cancer and HTN. breast                    prosthesis/ca, ESRD,anemia,septic shock,heart failure. Study Detail: The following Echo studies were performed: 2D, M-Mode, Doppler and               color flow. Technically challenging study due to prosthesis,               prominent lung artifact and body habitus.  PHYSICIAN INTERPRETATION: Left Ventricle: Left ventricular systolic function is normal, with an estimated ejection fraction of 70%. There are no regional wall motion abnormalities. The left ventricular cavity size is normal. Left ventricular diastolic filling was indeterminate. Left Atrium: The left atrium is moderately dilated. Right Ventricle: The right ventricle is normal in size. There is normal right ventricular global systolic function. Right Atrium: The right atrium is normal in size. Aortic Valve: The aortic valve is trileaflet. There is no evidence of aortic valve regurgitation. The peak instantaneous gradient of the aortic valve is 2.8 mmHg. Mitral Valve:  The mitral valve is normal in structure. There is no evidence of mitral valve regurgitation. Tricuspid Valve: The tricuspid valve is structurally normal. There is trace tricuspid regurgitation. Pulmonic Valve: The pulmonic valve is not well visualized. The pulmonic valve regurgitation was not well visualized. Pericardium: There is no pericardial effusion noted. Aorta: The aortic root is normal.  CONCLUSIONS:  1. Left ventricular systolic function is normal with a 70% estimated ejection fraction.  2. The left atrium is moderately dilated.  3. Left ventricular diastolic filling indeterminate. QUANTITATIVE DATA SUMMARY: 2D MEASUREMENTS:                          Normal Ranges: LAs:           4.50 cm   (2.7-4.0cm) IVSd:          0.61 cm   (0.6-1.1cm) LVPWd:         0.88 cm   (0.6-1.1cm) LVIDd:         3.66 cm   (3.9-5.9cm) LV Mass Index: 41.9 g/m2 LV SYSTOLIC FUNCTION BY 2D PLANIMETRY (MOD):                     Normal Ranges: EF-A4C View: 68.3 % (>=55%) LV DIASTOLIC FUNCTION:                     Normal Ranges: MV Peak E: 1.19 m/s (0.7-1.2 m/s) MV Peak A: 0.90 m/s (0.42-0.7 m/s) E/A Ratio: 1.32     (1.0-2.2) MITRAL VALVE:                 Normal Ranges: MV DT: 192 msec (150-240msec) AORTIC VALVE:                         Normal Ranges: AoV Vmax:      0.84 m/s (<=1.7m/s) AoV Peak P.8 mmHg (<20mmHg) LVOT Max Shimon:  0.47 m/s (<=1.1m/s) LVOT Diameter: 1.90 cm  (1.8-2.4cm) AoV Area,Vmax: 1.57 cm2 (2.5-4.5cm2) TRICUSPID VALVE/RVSP:                   Normal Ranges: IVC Diam: 1.05 cm  [id number] Fausto [facility]  Electronically signed on 2024 at 2:53:04 PM  ** Final **     XR foot left 3+ views    Result Date: 2024  Interpreted By:  Real Mendieta, STUDY: XR FOOT LEFT 1-2 VIEWS; 2024 4:15 pm   INDICATION: Signs/Symptoms:osteomyelitis. Pain   COMPARISON: 2023   ACCESSION NUMBER(S): [id number]   ORDERING CLINICIAN: BAN GIFFORD   TECHNIQUE: Views:  AP, Lat, Oblique, left foot   FINDINGS: RESULT: There is no  evidence for fracture or dislocation. 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 Mild hammertoe deformities. Joint spaces appear adequately maintained. Soft tissue ulceration of the posterior aspect of the heel however no evidence for bony erosion to suggest osteomyelitis.       No evidence for acute fracture or acute bony erosion to suggest osteomyelitis. Chronic changes as described.   Signed by: Real Mendieta 1/21/2024 7:15 PM Dictation workstation:   [id number]    CT chest abdomen pelvis wo IV contrast    Result Date: 1/21/2024  Interpreted By:  Maria Garcia, STUDY: CT CHEST ABDOMEN PELVIS WO CONTRAST;  1/20/2024 11:42 pm   INDICATION: Mental status change. Elevated white blood cell count with infectious workup.   COMPARISON: 08/20/2023   ACCESSION NUMBER(S): [id number]   ORDERING CLINICIAN: BAN GIFFORD   TECHNIQUE: CT of the chest, abdomen and pelvis was performed with no oral or intravenous contrast administered. Sagittal and coronal reformations were completed by the technologist at the acquisition scanner.   All CT examinations are performed with 1 or more of the following dose reduction techniques: Automated exposure control, adjustment of mA and/or kv according to patient's size, or use of iterative reconstruction techniques.   FINDINGS: Please note that the study is limited without intravenous contrast.   CHEST: Bilateral pleural effusions are present with right effusion moderately small in size and with the left effusion moderately small in size as well. There is shift of the heart and mediastinum to the left of midline due to atelectasis of the left upper lobe. There is consolidation throughout left lower lobe with air bronchograms noted. On the right, there is compressive atelectasis seen posteriorly in the right lower lobe.   There is mild reactive mediastinal adenopathy seen at this time with mediastinal lymph nodes increased in size since prior study. Several of these  mediastinal nodes are at the upper limits of normal measuring 8 mm in short axis diameter.   No pericardial effusion is present. The cardiac size is normal with mitral annulus calcification.   There has been prior bilateral mastectomy with reconstruction of the left breast with implant placement. Surgical clips are visible within the left axilla. Stent grafts are visible within the left upper extremity and within the left innominate, subclavian as well as axillary veins.   A peripherally calcified 1.8 cm nodule arises from the lower pole of left thyroid lobe.   ABDOMEN:   LIVER: Unremarkable.   BILE DUCTS: No intrahepatic or extrahepatic biliary ductal dilatation is seen.   GALLBLADDER: Cholelithiasis is observed with some calcification of the gallbladder wall demonstrated as well. No gallbladder wall thickening or pericholecystic fluid is evident.   PANCREAS: Unremarkable   SPLEEN: Unremarkable   ADRENAL GLANDS: Unremarkable   KIDNEYS AND URETERS: Kidneys are small in size with extensive renal artery calcification demonstrated.   PELVIS:   BLADDER: Amos catheter is present within the decompressed urinary bladder.   REPRODUCTIVE ORGANS: Calcification of the arcuate arteries within the uterus is seen. There is no uterine enlargement or adnexal mass.   BOWEL: A rectal balloon is seen. There is no abnormal distention of the intestinal tract.   VESSELS: There is atherosclerosis of the thoracoabdominal aorta and iliac arteries with calcification in the walls of the celiac, splenic, hepatic, and mesenteric arteries.   PERITONEUM/RETROPERITONEUM/LYMPH NODES: There is a minimal amount of ascites seen about the liver and spleen with mild presacral edema noted.   ABDOMINAL WALL: There is anasarca involving the soft tissues of the abdomen and pelvis. Postoperative change from ventral hernia repair is seen.   BONES: Bilateral sacroiliitis is seen. There is lumbar dextroscoliosis. Chronic rotator cuff tear is present  bilaterally with osteoarthritis of both shoulders, right greater than left.       Chest 1.  Moderately small bilateral pleural effusions with left upper lobe atelectasis and compressive atelectasis seen posteriorly in right lower lobe. A left lower lobe pneumonia is identified. There is extensive airspace consolidation within left lower lobe with air bronchograms observed. Mild mediastinal reactive adenopathy is present as well.   Abdomen-Pelvis 1.  Cholelithiasis without evidence of cholecystitis. 2. Small amount of ascites with mild presacral edema and anasarca within the soft tissues of the abdominal wall. 3. Renal atrophy with extensive vascular calcifications.     MACRO: None   Signed by: Maria Garcia 1/21/2024 8:30 AM Dictation workstation:   [id number]    CT head wo IV contrast    Result Date: 1/21/2024  Interpreted By:  Maria Garcia, STUDY: CT HEAD WO IV CONTRAST 1/20/2024 11:42 pm   INDICATION: Signs/Symptoms:mental status changes   COMPARISON: 12/11/2023   ACCESSION NUMBER(S): [id number]   ORDERING CLINICIAN: BAN GIFFORD   TECHNIQUE: Unenhanced axial images of the brain are completed.   All CT examinations are performed with 1 or more of the following dose reduction techniques: Automated exposure control, adjustment of mA and/or kv according to patient's size, or use of iterative reconstruction techniques.   FINDINGS: Helical unenhanced axial images of the brain demonstrate a mild to moderate degree of ventricular enlargement with proportionate widening of the sulci and sylvian fissures. There is no midline shift, mass effect, extra-axial fluid collection, or acute intracranial hemorrhage. There is diminished density seen in the periventricular white matter indicating chronic microvascular ischemic disease. There is calcified plaque seen within the distal vertebral and internal carotid arteries bilaterally. No calvarial abnormality is seen.       Atrophy and chronic microvascular ischemic disease without  acute intracranial process.   Signed by: Maria Garcia 1/21/2024 8:09 AM Dictation workstation:   [id number]    XR chest 1 view    Result Date: 1/20/2024  Interpreted By:  Brendon Perez, STUDY: XR CHEST 1 VIEW; 1/20/2024 5:03 pm   INDICATION: CLINICAL INFORMATION: Signs/Symptoms:Insertion right IJ central line, also left thoracentesis.   COMPARISON: 01/19/2024 at 1338 hours   ACCESSION NUMBER(S): [id number]   ORDERING CLINICIAN: BOZENA ANTONIO   TECHNIQUE: Portable chest one view.   FINDINGS: The cardiac size is indeterminate in view of the AP projection. There is no change in the right-sided dual port central venous catheter. There is a new right internal jugular venous catheter present with the tip at approximately same level as the dual port central venous catheter, overlying the mid to lower right atrium. There is no evidence for pneumothorax. Patient has a history of left thoracentesis without evidence for pneumothorax on the left. There is bilateral basilar alveolar infiltrate and effusions. Left effusion is decreased compared to the study from 1 day earlier.   Surgical clips are identified within left chest wall. Endovascular stent is identified in the distribution of the left subclavian artery.       1. Status post right-sided central venous catheter placement as described above with the tip overlying the mid to caudal aspect of the right atrium. There is no evidence for pneumothorax. 2. No evidence for left-sided pneumothorax after left-sided thoracentesis. Decrease in the left effusion. 3. Bilateral basilar infiltrates and effusions are present. Follow-up to assure complete clearing is suggested.   MACRO: none   Signed by: Brendon Perez 1/20/2024 5:11 PM Dictation workstation:   [id number]    XR chest 1 view    Result Date: 1/19/2024  Interpreted By:  Real Mendieta, STUDY: XR CHEST 1 VIEW; 1/19/2024 1:38 pm   INDICATION: Signs/Symptoms:dyspnea.   COMPARISON: 12/26/2023   ACCESSION NUMBER(S):  [id number]   ORDERING CLINICIAN: EMELY GOLDSMITH   TECHNIQUE: 1 view of the chest was performed.   FINDINGS: There may be a minimal right pleural effusion. Slight prominence of the interstitium otherwise the right lung is clear. Improved appearance of the left lung with improved aeration of the right upper lobe. There is opacification of the left lower lobe possibly due to large pleural effusion which is similar to the prior study. No pneumothorax. Right-sided dual lumen central line catheter with tip at the proximal atrium. The cardiomediastinal silhouette is within normal limits. Left-sided subclavian stents are again noted.       Improved aeration and appearance of the left lung superiorly however there is a persistent large left pleural effusion and opacification of the left lower lobe.   Signed by: Real Mendieta 1/19/2024 1:44 PM Dictation workstation:   [id number]         Assessment / Plan       PROBLEM LIST:  - End stage heart failure with preserved EF - on vasopressors  - End stage renal disease on HD  - Cardiogenic shock - will continue to wean down levo  - Acute metabolic encephalopathy  - Failure to wean from vasopressors  - Goals of care discussion    MANAGEMENT PLAN:  - Discussed at length with daughter about the end stage nature of her disease with multiple organ failure  - Palliative care consult  - Cardiology consultation - patient's daughter declined a RHC  - Peer to peer done with insurance, NOT approved  - Family will update us with goals of care  - Cont Doxy and Vanc PO. Appreciate ID consult.  - Routine ICU care  - PT/OT    I have personally interviewed and examined the patient.  I have personally verified elements of the exam listed above. Interval changes or irregularities are as noted.  I have personally and independently reviewed laboratory, radiographic and procedural data.  I have personally reviewed the problem list above and concur. Changes, if any, are noted.  I have personally  reviewed the plan list above and concur. Changes, if any, are noted.    The patient has high probability of compromise including but not limited to organ system failure, mechanical respiratory and circulatory support, cardiac arrest and death. I have discussed this in detail with the family / caregivers.    I have personally spent 50 minutes of face to face critical care time (not including billable procedures billed separately) in taking care of the patient.  I have personally answered all questions to the satisfaction of the patient and / or family members to their satisfaction.        Minh Shane MD

## 2024-02-08 NOTE — PROGRESS NOTES
"Ayanna Gross is a 73 y.o. female on day 20 of admission presenting with Septic shock (CMS/HCC).    Subjective   Resting comfortably.  Oxygen requirements are unchanged.  Levophed is on and is now being titrated to mentation.  Maps are in the mid 50s.       Objective     Physical Exam  Deferred  Last Recorded Vitals  Blood pressure (!) 57/41, pulse 106, temperature 36.4 °C (97.5 °F), temperature source Temporal, resp. rate 24, height 1.575 m (5' 2\"), weight 101 kg (223 lb 1.7 oz), SpO2 (!) 74 %.  Intake/Output last 3 Shifts:  I/O last 3 completed shifts:  In: 786.4 (8.5 mL/kg) [P.O.:360; I.V.:426.4 (4.6 mL/kg)]  Out: 480 (5.2 mL/kg) [Stool:480]  Dosing Weight: 93 kg       Assessment/Plan   Principal Problem:    Septic shock (CMS/HCC)  Active Problems:    Pneumonia    Low blood pressure    End stage renal disease (CMS/HCC)    Anemia due to blood loss, acute    Hypotension  Chronic diastolic dysfunction  End-stage renal disease on hemodialysis  Peripheral arterial disease  Bilateral heel ischemic ulcers/gangrene   Diabetes mellitus type 2     2/6: The nature of her persistent hypotension is not entirely clear.  Though she has been treated with prolonged antibiotics she has had several issues with sepsis and currently has gangrenous ulcers of the heels.  Her echocardiogram shows normal systolic function.  Left atrial enlargement speaks to some diastolic dysfunction however I would be rather surprised if it were to the extent to where we are dealing with a low output state as a result.  Right heart catheterization would be reasonable for diagnostic purposes however I do not suspect she would be a candidate for any advanced therapies.  Will speak with her daughter who helps make medical decisions for her.     2/7: Her exam is not congruent with decompensated heart failure, however her care team is having a hard time identifying the source of her persistent hypotension. I am asking for a limited echo to further quantify " the degree of mitral stenosis, prior echos have not been able to quantify. Will speak with the family today about RHC. This may help us from a diagnostic standpoint however she is not a candidate for advanced therapies. The patient and family should understand this prior to pursuing this.    2/8: At this point I feel that transitioning to comfort measures would be most appropriate.  I do not feel like she has any further therapeutic options as far as her hemodynamics are concerned.  I maintain that this would be a rather atypical presentation of low output heart failure with a normal ejection fraction, very mild mitral stenosis and stage II diastolic dysfunction.       I spent 15 minutes in the professional and overall care of this patient.      Herminio Lemus, DO

## 2024-02-08 NOTE — PROGRESS NOTES
Ayanna Gross is a 73 y.o. female on day 20 of admission presenting with Septic shock (CMS/HCC).    Subjective   Interval History:    afebrile, no chills  Remains on pressors  Daughter and granddaughters present  Awake, alert    Review of Systems   All other systems reviewed and are negative.      Objective   Range of Vitals (last 24 hours)  Heart Rate:  []   Temp:  [36.2 °C (97.2 °F)-36.4 °C (97.5 °F)]   Resp:  [15-31]   BP: ()/()   SpO2:  [80 %-96 %]   Daily Weight  02/07/24 : 101 kg (223 lb 1.7 oz)    Body mass index is 40.81 kg/m².    Physical Exam    Constitutional:       Appearance: Normal appearance.   HENT:      Head: Normocephalic and atraumatic.      Nose: Nose normal.   Eyes:      Extraocular Movements: Extraocular movements intact.      Conjunctiva/sclera: Conjunctivae normal.   Cardiovascular:      Heart sounds: Normal heart sounds, S1 normal and S2 normal.   Pulmonary:      Breath sounds: Decreased breath sounds present.   Abdominal:      General: Bowel sounds are normal.      Palpations: Abdomen is soft.   Musculoskeletal:      Cervical back: Normal range of motion and neck supple.   Skin:     Comments: Stage III sacral ulcer, bilateral posterior heel eschar -photos examined  Neurological:      Mental Status: She is alert.      Antibiotics  aspirin tablet 325 mg  acetaminophen (Tylenol) tablet 650 mg  cefepime (Maxipime) 1 g in dextrose 5 % 50 mL IV  sodium chloride 0.9 % bolus 2,229 mL  apixaban (Eliquis) tablet 2.5 mg  escitalopram (Lexapro) tablet 10 mg  febuxostat (Uloric) tablet 40 mg  fenofibrate (Triglide) tablet 160 mg  gabapentin (Neurontin) capsule 100 mg  midodrine (Proamatine) tablet 15 mg  nystatin (Mycostatin) 100,000 unit/gram powder  oxyCODONE-acetaminophen (Percocet) 5-325 mg per tablet 1 tablet  simvastatin (Zocor) tablet 20 mg  piperacillin-tazobactam-dextrose (Zosyn) IV 2.25 g  vancomycin (Vancocin) capsule 125 mg  oxygen (O2) therapy  dextrose 50 % injection 25  g  glucagon (Glucagen) injection 1 mg  dextrose 10 % in water (D10W) infusion  insulin lispro (HumaLOG) injection 0-10 Units  nystatin (Mycostatin) 100,000 unit/gram powder 1 Application  vancomycin-diluent combo no.1 (Xellia) IVPB 1,750 mg  piperacillin-tazobactam-dextrose (Zosyn) IV 2.25 g  sodium chloride 0.9 % bolus 500 mL  norepinephrine (Levophed) 8 mg in dextrose 5% 250 mL (0.032 mg/mL) infusion (premix)  vancomycin (Vancocin) placeholder  pantoprazole (ProtoNix) EC tablet 40 mg  pantoprazole (ProtoNix) injection 40 mg  sennosides-docusate sodium (Judy-Colace) 8.6-50 mg per tablet 1 tablet  doxycycline (Vibramycin) in dextrose 5 % in water (D5W) 100 mL  mg  LORazepam (Ativan) injection 2 mg  magnesium sulfate IV 2 g  zinc oxide 20 % ointment 1 Application  nystatin (Mycostatin) cream  norepinephrine (Levophed) 8 mg in dextrose 5% 250 mL (0.032 mg/mL) infusion (premix)  heparin 1,000 unit/mL injection 2,000 Units  heparin 1,000 unit/mL injection 2,000 Units  nystatin (Mycostatin) ointment  acetaminophen (Tylenol) oral liquid 1,000 mg  albumin human 25 % solution 12.5 g  perflutren lipid microspheres (Definity) injection 0.5-10 mL of dilution  sulfur hexafluoride microsphr (Lumason) injection 24.28 mg  perflutren protein A microsphere (Optison) injection 0.5 mL  ipratropium-albuteroL (Duo-Neb) 0.5-2.5 mg/3 mL nebulizer solution 3 mL  sodium chloride 3 % nebulizer solution 3 mL  vancomycin-diluent combo no.1 (Xellia) IVPB 750 mg  sennosides-docusate sodium (Judy-Colace) 8.6-50 mg per tablet 1 tablet  acetaminophen (Tylenol) tablet 650 mg  doxycycline (Vibramycin) capsule 100 mg  magnesium oxide (Mag-Ox) tablet 400 mg  vasopressin (Vasostrict) 0.2 unit/mL infusion  norepinephrine (Levophed) 8 mg in dextrose 5% 250 mL (0.032 mg/mL) infusion (premix)  heparin 1,000 unit/mL injection 2,000 Units  heparin 1,000 unit/mL injection 2,000 Units  albumin human 25 % solution 12.5 g  vancomycin (Xellia) 1 g in 200 mL  (Xellia) IVPB 1 g  ipratropium-albuteroL (Duo-Neb) 0.5-2.5 mg/3 mL nebulizer solution 3 mL  sodium chloride 3 % nebulizer solution 3 mL  albumin human 5 % infusion 12.5 g  heparin 1,000 unit/mL injection 2,000 Units  heparin 1,000 unit/mL injection 2,000 Units  vancomycin (Vancocin) capsule 125 mg  albumin human 25 % solution 12.5 g  heparin 1,000 unit/mL injection 2,000 Units  heparin 1,000 unit/mL injection 2,000 Units  ipratropium-albuteroL (Duo-Neb) 0.5-2.5 mg/3 mL nebulizer solution 3 mL  lidocaine (Xylocaine) 10 mg/mL (1 %) injection 50 mg  sodium phosphate 15 mmol in sodium chloride 0.9% 250 mL IV  sod phos di, mono-K phos mono (K Phos Neutral) tablet 250 mg  potassium, sodium phosphates (Phos-NaK) 280-160-250 mg packet 1 packet  doxycycline (Vibramycin) capsule 100 mg  fludrocortisone (Florinef) tablet 0.1 mg  gabapentin (Neurontin) capsule 100 mg  acetaminophen (Tylenol) tablet 1,000 mg  benzocaine-menthoL (Dermoplast) topical spray  vancomycin (Vancocin) capsule 125 mg  heparin 1,000 unit/mL injection 2,000 Units  heparin 1,000 unit/mL injection 2,000 Units  magnesium sulfate IV 2 g  albumin human 25 % solution 12.5 g  promethazine (Phenergan) injection 12.5 mg  ondansetron (Zofran) injection 4 mg  lidocaine (Xylocaine) 10 mg/mL (1 %) injection 50 mg  alteplase (Cathflo Activase) injection 2 mg  acetaminophen (Tylenol) tablet 650 mg  psyllium (Metamucil) 3.4 gram packet 1 packet  perflutren lipid microspheres (Definity) injection 0.5-10 mL of dilution  sulfur hexafluoride microsphr (Lumason) injection 24.28 mg  perflutren protein A microsphere (Optison) injection 0.5 mL  epoetin di-epbx (Retacrit) injection 10,000 Units  sulfur hexafluoride microsphr (Lumason) injection 24.28 mg  perflutren lipid microspheres (Definity) injection 0.5-10 mL of dilution  perflutren lipid microspheres (Definity) injection 0.5-10 mL of dilution  sulfur hexafluoride microsphr (Lumason) injection 24.28 mg  perflutren protein A  microsphere (Optison) injection 0.5 mL  ammonium lactate (Lac-Hydrin) 12 % lotion 1 Application      Relevant Results  Labs  Results from last 72 hours   Lab Units 02/06/24  0454   WBC AUTO x10*3/uL 11.1   HEMOGLOBIN g/dL 8.8*   HEMATOCRIT % 26.4*   PLATELETS AUTO x10*3/uL 169     Results from last 72 hours   Lab Units 02/06/24  0454   SODIUM mmol/L 133   POTASSIUM mmol/L 4.1   CHLORIDE mmol/L 99   CO2 mmol/L 21*   BUN mg/dL 13   CREATININE mg/dL 2.40*   GLUCOSE mg/dL 188*   CALCIUM mg/dL 8.5   ANION GAP mmol/L 13   EGFR mL/min/1.73m*2 21*     Results from last 72 hours   Lab Units 02/06/24  0454   ALK PHOS U/L 353*   BILIRUBIN TOTAL mg/dL 0.9   PROTEIN TOTAL g/dL 5.7*   ALT U/L 20   AST U/L 66*   ALBUMIN g/dL 2.5*     Estimated Creatinine Clearance: 23.9 mL/min (A) (by C-G formula based on SCr of 2.4 mg/dL (H)).  C-Reactive Protein   Date Value Ref Range Status   12/25/2023 5.20 (H) 0.00 - 2.00 mg/dL Final     CRP   Date Value Ref Range Status   06/23/2023 19.9 (H) 0 - 2.0 MG/DL Final     Comment:     Performed at [facility] [facility] [id number]   05/22/2022 1.0 0 - 2.0 MG/DL Final     Comment:     Performed at [facility] [id number]     Microbiology   reviewed  Imaging  Transthoracic Echo (TTE) Limited    Result Date: 2/7/2024           [place] [id number]            Phone [phone] TRANSTHORACIC ECHOCARDIOGRAM REPORT  Patient Name:      BASIA Gray Physician:    [id number] Littlejohn DO Study Date:        2/7/2024            Ordering Provider:    [id number] LITTLEJOHN MRN/PID:           [id number]            Fellow: Accession#:        [id number]        Nurse: Date of Birth/Age: 1950 / 73 years Sonographer:          Rena                                                               Sadaf ACS,                                                              RDCS, FASE Gender:            F                   Additional Staff: Height:            157.48 cm           Admit Date: Weight:                                Admission Status:     Inpatient - Routine BSA:               m2                  Department Location:  [facility] ICU Blood Pressure: 95 /42 mmHg Study Type:    TRANSTHORACIC ECHO (TTE) LIMITED Diagnosis/ICD: Hypotension, unspecified-I95.9; Mitral valve disorder-I05.9 Indication:    Limited to assess for MS, Hypotension CPT Codes:     Echo Limited-03701; Color Doppler-16388; Doppler Limited-98281 Patient History: Pertinent History: Hypotension, hx of MV endocarditis, PAD, DM2 ESRD on Hd. Study Detail: The following Echo studies were performed: 2D, M-Mode, Doppler and               color flow. Technically challenging study due to poor acoustic               windows and L Breast implant. Unable to obtain suprasternal notch               view.  PHYSICIAN INTERPRETATION: Left Ventricle: Left ventricular systolic function is normal. There are no regional wall motion abnormalities. The left ventricular cavity size is normal. Left ventricular diastolic filling was not assessed. Left Atrium: The left atrium is moderate to severely dilated. Right Ventricle: The right ventricle is normal in size. There is normal right ventricular global systolic function. Right Atrium: The right atrium was not well visualized. Aortic Valve: The aortic valve was not assessed. Aortic valve regurgitation was not assessed. Mitral Valve: The mitral valve is abnormal. There is evidence of mild mitral valve stenosis. The doppler estimated mean and peak diastolic pressure gradients are 4.0 mmHg and 6.6 mmHg respectively. There is severe mitral annular calcification. There is no evidence of mitral valve regurgitation. Tricuspid Valve: The tricuspid valve was not assessed. Tricuspid regurgitation was not assessed.  Pulmonic Valve: The pulmonic valve is not well visualized. The pulmonic valve regurgitation was not assessed. Pericardium: There is no pericardial effusion noted. Aorta: The aortic root was not assessed.  CONCLUSIONS:  1. Left ventricular systolic function is normal.  2. The left atrium is moderate to severely dilated.  3. There is severe mitral annular calcification. QUANTITATIVE DATA SUMMARY: LV DIASTOLIC FUNCTION:                        Normal Ranges: MV Peak E:    1.23 m/s (0.7-1.2 m/s) MV Peak A:    1.12 m/s (0.42-0.7 m/s) E/A Ratio:    1.10     (1.0-2.2) MV lateral e' 0.04 m/s MV medial e'  0.07 m/s MITRAL VALVE:                      Normal Ranges: MV Vmax:    1.28 m/s (<=1.3m/s) MV peak P.6 mmHg (<5mmHg) MV mean P.0 mmHg (<48mmHg)  RIGHT VENTRICLE: RV Basal 2.97 cm  [id number] Herminio Lemus DO Electronically signed on 2024 at 3:50:21 PM  ** Final **     Transthoracic Echo (TTE) Limited    Result Date: 2024           [place] [address]            Phone [phone] TRANSTHORACIC ECHOCARDIOGRAM REPORT  Patient Name:      BASIATRAVIS Gray Physician:    [id number] Lemus DO Study Date:        2024            Ordering Provider:    [id number] REBECA RESENDEZ MRN/PID:           [id number]            Fellow: Accession#:        [id number]        Nurse: Date of Birth/Age: 1950 / 73 years Sonographer:          Jennifer WALL Gender:            F                   Additional Staff: Height:            157.48 cm           Admit Date:           2024 Weight:            98.88 kg            Admission Status:     Inpatient - Routine BSA:               1.98 m2             Department Location: Blood Pressure: 59 /49 mmHg Study Type:    TRANSTHORACIC ECHO (TTE) LIMITED Diagnosis/ICD: Acute on chronic diastolic (congestive)  heart failure                (CHF)-I50.33 Indication:    Acute on chronic diastolic congestive heart failure CPT Codes:     Echo Limited-89371; Color Doppler-31405; Doppler Limited-04330 Patient History: Pertinent History: PAF Peripheralvascular disease low blood pressure HTN Breast                    Cancer Mixed Hyperlipidemia HF CVA Hypotension DMII CAD CKD. Study Detail: The following Echo studies were performed: 2D, M-Mode, Doppler and               color flow. Technically challenging study due to body habitus,               patient lying in supine position, poor acoustic windows, prominent               lung artifact and Breast Prosthesis. Definity used as a contrast               agent for endocardial border definition. Unable to obtain               suprasternal notch view.  PHYSICIAN INTERPRETATION: Left Ventricle: Left ventricular systolic function is normal, with an estimated ejection fraction of 60-65%. There are no regional wall motion abnormalities. The left ventricular cavity size is normal. Left ventricular diastolic filling was indeterminate. Left Atrium: The left atrium is moderately dilated. Right Ventricle: The right ventricle is normal in size. There is normal right ventricular global systolic function. Right Atrium: The right atrium is normal in size. Aortic Valve: The aortic valve is trileaflet. There is no evidence of aortic valve regurgitation. The peak instantaneous gradient of the aortic valve is 5.3 mmHg. Mitral Valve: The mitral valve is normal in structure. There is severe mitral annular calcification. There is trace mitral valve regurgitation. Tricuspid Valve: The tricuspid valve is structurally normal. There is mild tricuspid regurgitation. Pulmonic Valve: The pulmonic valve is not well visualized. The pulmonic valve regurgitation was not well visualized. Pericardium: There is no pericardial effusion noted. Aorta: The aortic root is normal.  CONCLUSIONS:  1. Left ventricular systolic  function is normal with a 60-65% estimated ejection fraction.  2. The left atrium is moderately dilated.  3. There is severe mitral annular calcification. QUANTITATIVE DATA SUMMARY: 2D MEASUREMENTS:                          Normal Ranges: LAs:           3.80 cm   (2.7-4.0cm) IVSd:          0.92 cm   (0.6-1.1cm) LVPWd:         1.09 cm   (0.6-1.1cm) LVIDd:         2.98 cm   (3.9-5.9cm) LVIDs:         2.12 cm LV Mass Index: 41.2 g/m2 LV % FS        28.9 % LA VOLUME:                               Normal Ranges: LA Vol A4C:        58.3 ml    (22+/-6mL/m2) LA Vol A2C:        60.9 ml LA Vol BP:         62.1 ml LA Vol Index A4C:  29.4ml/m2 LA Vol Index A2C:  30.7 ml/m2 LA Vol Index BP:   31.3 ml/m2 LA Area A4C:       20.5 cm2 LA Area A2C:       20.1 cm2 LA Major Axis A4C: 6.1 cm LA Major Axis A2C: 5.6 cm LA Volume Index:   29.3 ml/m2 LA Vol A4C:        53.1 ml LA Vol A2C:        58.4 ml LV SYSTOLIC FUNCTION BY 2D PLANIMETRY (MOD):                     Normal Ranges: EF-A4C View: 68.2 % (>=55%) EF-A2C View: 65.9 % EF-Biplane:  67.8 % AORTIC VALVE:                         Normal Ranges: AoV Vmax:      1.15 m/s (<=1.7m/s) AoV Peak P.3 mmHg (<20mmHg) LVOT Max Shimon:  1.11 m/s (<=1.1m/s) LVOT VTI:      16.20 cm LVOT Diameter: 1.90 cm  (1.8-2.4cm) AoV Area,Vmax: 2.74 cm2 (2.5-4.5cm2) PULMONIC VALVE:                         Normal Ranges: PV Accel Time: 63 msec  (>120ms) PV Max Shimon:    1.0 m/s  (0.6-0.9m/s) PV Max P.3 mmHg  [id number] Herminio Lemus DO Electronically signed on 2024 at 7:58:25 AM  ** Final **     Upper extremity venous duplex bilateral    Result Date: 2024           [place] [address]            Phone [phone]  Vascular Lab Report  [facility] UPPER EXTREMITY VENOUS DUPLEX BILATERAL Patient Name:      BASIA WEISORAYA Gray Physician:  [id number] Mini Busby MD, RPVI Study Date:         1/25/2024           Ordering Provider:  [id number] ERI BERMAN MRN/PID:           [id number]            Fellow: Accession#:        [id number]        Technologist:       Dara Parr RVT Date of Birth/Age: 1950 / 73 years Technologist 2: Gender:            F                   Encounter#:         [id number] Admission Status:  Inpatient           Location Performed: [facility]  Diagnosis/ICD: Right arm swelling-M79.89; Left arm swelling-M79.89 CPT Codes:     83070 Peripheral venous duplex scan for DVT complete  CONCLUSIONS:  Right Upper Venous: No evidence of acute deep vein thrombus visualized in the right upper extremity. Internal jugular vein was visualized in segments due to IV lines and bandages. Left Upper Venous: No evidence of acute deep vein thrombus visualized in the left upper extremity. Subclavian stent is noted and appears patent. There is a known occluded dialysis access noted.  Additional Findings: Technically difficult exam due to IV lines, bandages, and patient's positioning.  Imaging & Doppler Findings:  Right               Compressible Thrombus        Flow Internal Jugular        Yes        None   Spontaneous/Phasic Subclavian              Yes        None Subclavian Proximal     Yes        None   Spontaneous/Phasic Subclavian Mid          Yes        None Subclavian Distal       Yes        None   Spontaneous/Phasic Axillary                Yes        None       Pulsatile Brachial                Yes        None Cephalic                Yes        None Basilic                 Yes        None  Left                Compress Thrombus        Flow Internal Jugular      Yes      None       Pulsatile Subclavian            Yes      None Subclavian Proximal   Yes      None       Pulsatile Subclavian Mid        Yes      None       Pulsatile Subclavian Distal     Yes      None       Pulsatile Axillary              Yes      None   Spontaneous/Phasic Brachial              Yes      None Cephalic               Yes      None Basilic               Yes      None  [id number] Mini Busby MD, ALICE Electronically signed by [id number] ALICE Kelly MD on 1/25/2024 at 4:06:13 PM  ** Final **     ECG 12 lead    Result Date: 1/24/2024  Sinus tachycardia  with 1st degree AV block Right bundle branch block Possible Lateral infarct , age undetermined Abnormal ECG Confirmed by Yesika Nj ([id number]) on 1/24/2024 6:54:45 AM    XR tibia fibula right 2 views    Result Date: 1/22/2024  Interpreted By:  Real Mendieta, STUDY: XR TIBIA FIBULA RIGHT 2 VIEWS; 1/22/2024 2:15 pm   INDICATION: Signs/Symptoms:evaluate source of infection, osteomyelitis;   COMPARISON: None available.   ACCESSION NUMBER(S): [id number]   ORDERING CLINICIAN: BAN GIFFORD   TECHNIQUE: Views: AP and Lateral of the right tibia and fibula   FINDINGS: RESULT: There is no evidence for fracture or dislocation. Tricompartmental degenerative changes of the right knee. The tibia and fibula appear intact without bony erosion or evidence for osteomyelitis. No other bony or soft tissue abnormality is identified. No subcutaneous gas is noted.       No evidence for acute osseous abnormality. No bony erosion to suggest osteomyelitis.   Signed by: Real Mendieta 1/22/2024 3:15 PM Dictation workstation:   [id number]    XR ankle right 3+ views    Result Date: 1/22/2024  Interpreted By:  Real Mendieta, STUDY: XR ANKLE RIGHT 3+ VIEWS; 1/22/2024 2:15 pm   INDICATION: Signs/Symptoms:Evuluate source of infection, osteomyelitis;   COMPARISON: None available.   ACCESSION NUMBER(S): [id number]   ORDERING CLINICIAN: BAN GIFFORD   TECHNIQUE: Views: AP, Lateral, Oblique, right ankle   FINDINGS: RESULT: There is no evidence for fracture or dislocation. The ankle mortise is intact. Joint spaces appear adequately maintained. No bony erosion to suggest osteomyelitis. No bone lesion or soft tissue abnormality is identified. No subcutaneous gas is seen.       No evidence for acute osseous  abnormality. No bony erosion to suggest osteomyelitis.   Signed by: Real Mendieta 1/22/2024 3:14 PM Dictation workstation:   [id number]    Transthoracic Echo (TTE) Complete    Result Date: 1/22/2024           [place] [id number]            Phone [phone] TRANSTHORACIC ECHOCARDIOGRAM REPORT  Patient Name:      BASIA VEGA      Reading Physician:   [id number] Fausto Soledad  Study Date:        1/22/2024           Ordering Provider:   [id number] ERI BERMAN MRN/PID:           [id number]            Fellow: Accession#:        [id number]        Nurse: Date of Birth/Age: 1950 / 73 years Sonographer:         Tabatha Page RDCS Gender:            F                   Additional Staff: Height:            157.00 cm           Admit Date: Weight:            75.00 kg            Admission Status:    Inpatient - Routine BSA:               1.76 m2             Department Location: [facility] Blood Pressure: 88 /49 mmHg Study Type:    TRANSTHORACIC ECHO (TTE) COMPLETE Diagnosis/ICD: Chronic combined systolic (congestive) and diastolic (congestive)                heart failure (CHF)-I50.42; Sepsis, unspecified organism-A41.9 Indication:    heart failure,septic shock CPT Codes:     Echo Complete w Full Doppler-26874 Patient History: BMI:               Obese >30 Pertinent History: Sepsis, PVD, A-Fib, CVA, Cancer and HTN. breast                    prosthesis/ca, ESRD,anemia,septic shock,heart failure. Study Detail: The following Echo studies were performed: 2D, M-Mode, Doppler and               color flow. Technically challenging study due to prosthesis,               prominent lung artifact and body habitus.  PHYSICIAN INTERPRETATION: Left Ventricle: Left ventricular systolic function is normal, with an estimated ejection fraction of 70%. There are no regional wall motion abnormalities. The left ventricular cavity size is  normal. Left ventricular diastolic filling was indeterminate. Left Atrium: The left atrium is moderately dilated. Right Ventricle: The right ventricle is normal in size. There is normal right ventricular global systolic function. Right Atrium: The right atrium is normal in size. Aortic Valve: The aortic valve is trileaflet. There is no evidence of aortic valve regurgitation. The peak instantaneous gradient of the aortic valve is 2.8 mmHg. Mitral Valve: The mitral valve is normal in structure. There is no evidence of mitral valve regurgitation. Tricuspid Valve: The tricuspid valve is structurally normal. There is trace tricuspid regurgitation. Pulmonic Valve: The pulmonic valve is not well visualized. The pulmonic valve regurgitation was not well visualized. Pericardium: There is no pericardial effusion noted. Aorta: The aortic root is normal.  CONCLUSIONS:  1. Left ventricular systolic function is normal with a 70% estimated ejection fraction.  2. The left atrium is moderately dilated.  3. Left ventricular diastolic filling indeterminate. QUANTITATIVE DATA SUMMARY: 2D MEASUREMENTS:                          Normal Ranges: LAs:           4.50 cm   (2.7-4.0cm) IVSd:          0.61 cm   (0.6-1.1cm) LVPWd:         0.88 cm   (0.6-1.1cm) LVIDd:         3.66 cm   (3.9-5.9cm) LV Mass Index: 41.9 g/m2 LV SYSTOLIC FUNCTION BY 2D PLANIMETRY (MOD):                     Normal Ranges: EF-A4C View: 68.3 % (>=55%) LV DIASTOLIC FUNCTION:                     Normal Ranges: MV Peak E: 1.19 m/s (0.7-1.2 m/s) MV Peak A: 0.90 m/s (0.42-0.7 m/s) E/A Ratio: 1.32     (1.0-2.2) MITRAL VALVE:                 Normal Ranges: MV DT: 192 msec (150-240msec) AORTIC VALVE:                         Normal Ranges: AoV Vmax:      0.84 m/s (<=1.7m/s) AoV Peak P.8 mmHg (<20mmHg) LVOT Max Shimon:  0.47 m/s (<=1.1m/s) LVOT Diameter: 1.90 cm  (1.8-2.4cm) AoV Area,Vmax: 1.57 cm2 (2.5-4.5cm2) TRICUSPID VALVE/RVSP:                   Normal Ranges: IVC Diam:  1.05 cm  [id number] Fausto Rowe DO Electronically signed on 1/22/2024 at 2:53:04 PM  ** Final **     XR foot left 3+ views    Result Date: 1/21/2024  Interpreted By:  Real Mendieta, STUDY: XR FOOT LEFT 1-2 VIEWS; 1/21/2024 4:15 pm   INDICATION: Signs/Symptoms:osteomyelitis. Pain   COMPARISON: 12/07/2023   ACCESSION NUMBER(S): [id number]   ORDERING CLINICIAN: BAN GIFFORD   TECHNIQUE: Views:  AP, Lat, Oblique, left foot   FINDINGS: RESULT: There is no evidence for fracture or dislocation. 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 Mild hammertoe deformities. Joint spaces appear adequately maintained. Soft tissue ulceration of the posterior aspect of the heel however no evidence for bony erosion to suggest osteomyelitis.       No evidence for acute fracture or acute bony erosion to suggest osteomyelitis. Chronic changes as described.   Signed by: Real Mendieta 1/21/2024 7:15 PM Dictation workstation:   [id number]    CT chest abdomen pelvis wo IV contrast    Result Date: 1/21/2024  Interpreted By:  Maria Garcia, STUDY: CT CHEST ABDOMEN PELVIS WO CONTRAST;  1/20/2024 11:42 pm   INDICATION: Mental status change. Elevated white blood cell count with infectious workup.   COMPARISON: 08/20/2023   ACCESSION NUMBER(S): [id number]   ORDERING CLINICIAN: BAN GIFFORD   TECHNIQUE: CT of the chest, abdomen and pelvis was performed with no oral or intravenous contrast administered. Sagittal and coronal reformations were completed by the technologist at the acquisition scanner.   All CT examinations are performed with 1 or more of the following dose reduction techniques: Automated exposure control, adjustment of mA and/or kv according to patient's size, or use of iterative reconstruction techniques.   FINDINGS: Please note that the study is limited without intravenous contrast.   CHEST: Bilateral pleural effusions are present with right effusion moderately small in size and with the left effusion  moderately small in size as well. There is shift of the heart and mediastinum to the left of midline due to atelectasis of the left upper lobe. There is consolidation throughout left lower lobe with air bronchograms noted. On the right, there is compressive atelectasis seen posteriorly in the right lower lobe.   There is mild reactive mediastinal adenopathy seen at this time with mediastinal lymph nodes increased in size since prior study. Several of these mediastinal nodes are at the upper limits of normal measuring 8 mm in short axis diameter.   No pericardial effusion is present. The cardiac size is normal with mitral annulus calcification.   There has been prior bilateral mastectomy with reconstruction of the left breast with implant placement. Surgical clips are visible within the left axilla. Stent grafts are visible within the left upper extremity and within the left innominate, subclavian as well as axillary veins.   A peripherally calcified 1.8 cm nodule arises from the lower pole of left thyroid lobe.   ABDOMEN:   LIVER: Unremarkable.   BILE DUCTS: No intrahepatic or extrahepatic biliary ductal dilatation is seen.   GALLBLADDER: Cholelithiasis is observed with some calcification of the gallbladder wall demonstrated as well. No gallbladder wall thickening or pericholecystic fluid is evident.   PANCREAS: Unremarkable   SPLEEN: Unremarkable   ADRENAL GLANDS: Unremarkable   KIDNEYS AND URETERS: Kidneys are small in size with extensive renal artery calcification demonstrated.   PELVIS:   BLADDER: Amos catheter is present within the decompressed urinary bladder.   REPRODUCTIVE ORGANS: Calcification of the arcuate arteries within the uterus is seen. There is no uterine enlargement or adnexal mass.   BOWEL: A rectal balloon is seen. There is no abnormal distention of the intestinal tract.   VESSELS: There is atherosclerosis of the thoracoabdominal aorta and iliac arteries with calcification in the walls of the  celiac, splenic, hepatic, and mesenteric arteries.   PERITONEUM/RETROPERITONEUM/LYMPH NODES: There is a minimal amount of ascites seen about the liver and spleen with mild presacral edema noted.   ABDOMINAL WALL: There is anasarca involving the soft tissues of the abdomen and pelvis. Postoperative change from ventral hernia repair is seen.   BONES: Bilateral sacroiliitis is seen. There is lumbar dextroscoliosis. Chronic rotator cuff tear is present bilaterally with osteoarthritis of both shoulders, right greater than left.       Chest 1.  Moderately small bilateral pleural effusions with left upper lobe atelectasis and compressive atelectasis seen posteriorly in right lower lobe. A left lower lobe pneumonia is identified. There is extensive airspace consolidation within left lower lobe with air bronchograms observed. Mild mediastinal reactive adenopathy is present as well.   Abdomen-Pelvis 1.  Cholelithiasis without evidence of cholecystitis. 2. Small amount of ascites with mild presacral edema and anasarca within the soft tissues of the abdominal wall. 3. Renal atrophy with extensive vascular calcifications.     MACRO: None   Signed by: Maria Garcia 1/21/2024 8:30 AM Dictation workstation:   [id number]    CT head wo IV contrast    Result Date: 1/21/2024  Interpreted By:  Maria Garcia, STUDY: CT HEAD WO IV CONTRAST 1/20/2024 11:42 pm   INDICATION: Signs/Symptoms:mental status changes   COMPARISON: 12/11/2023   ACCESSION NUMBER(S): [id number]   ORDERING CLINICIAN: BAN GIFFORD   TECHNIQUE: Unenhanced axial images of the brain are completed.   All CT examinations are performed with 1 or more of the following dose reduction techniques: Automated exposure control, adjustment of mA and/or kv according to patient's size, or use of iterative reconstruction techniques.   FINDINGS: Helical unenhanced axial images of the brain demonstrate a mild to moderate degree of ventricular enlargement with proportionate widening of the  sulci and sylvian fissures. There is no midline shift, mass effect, extra-axial fluid collection, or acute intracranial hemorrhage. There is diminished density seen in the periventricular white matter indicating chronic microvascular ischemic disease. There is calcified plaque seen within the distal vertebral and internal carotid arteries bilaterally. No calvarial abnormality is seen.       Atrophy and chronic microvascular ischemic disease without acute intracranial process.   Signed by: Maria Garcia 1/21/2024 8:09 AM Dictation workstation:   [id number]    XR chest 1 view    Result Date: 1/20/2024  Interpreted By:  Brendon Perez, STUDY: XR CHEST 1 VIEW; 1/20/2024 5:03 pm   INDICATION: CLINICAL INFORMATION: Signs/Symptoms:Insertion right IJ central line, also left thoracentesis.   COMPARISON: 01/19/2024 at 1338 hours   ACCESSION NUMBER(S): [id number]   ORDERING CLINICIAN: BOZENA ANTONIO   TECHNIQUE: Portable chest one view.   FINDINGS: The cardiac size is indeterminate in view of the AP projection. There is no change in the right-sided dual port central venous catheter. There is a new right internal jugular venous catheter present with the tip at approximately same level as the dual port central venous catheter, overlying the mid to lower right atrium. There is no evidence for pneumothorax. Patient has a history of left thoracentesis without evidence for pneumothorax on the left. There is bilateral basilar alveolar infiltrate and effusions. Left effusion is decreased compared to the study from 1 day earlier.   Surgical clips are identified within left chest wall. Endovascular stent is identified in the distribution of the left subclavian artery.       1. Status post right-sided central venous catheter placement as described above with the tip overlying the mid to caudal aspect of the right atrium. There is no evidence for pneumothorax. 2. No evidence for left-sided pneumothorax after left-sided thoracentesis.  Decrease in the left effusion. 3. Bilateral basilar infiltrates and effusions are present. Follow-up to assure complete clearing is suggested.   MACRO: none   Signed by: Brendon Perez 1/20/2024 5:11 PM Dictation workstation:   [id number]    XR chest 1 view    Result Date: 1/19/2024  Interpreted By:  Real Mendieta, STUDY: XR CHEST 1 VIEW; 1/19/2024 1:38 pm   INDICATION: Signs/Symptoms:dyspnea.   COMPARISON: 12/26/2023   ACCESSION NUMBER(S): [id number]   ORDERING CLINICIAN: EMELY GOLDSMITH   TECHNIQUE: 1 view of the chest was performed.   FINDINGS: There may be a minimal right pleural effusion. Slight prominence of the interstitium otherwise the right lung is clear. Improved appearance of the left lung with improved aeration of the right upper lobe. There is opacification of the left lower lobe possibly due to large pleural effusion which is similar to the prior study. No pneumothorax. Right-sided dual lumen central line catheter with tip at the proximal atrium. The cardiomediastinal silhouette is within normal limits. Left-sided subclavian stents are again noted.       Improved aeration and appearance of the left lung superiorly however there is a persistent large left pleural effusion and opacification of the left lower lobe.   Signed by: Real Mendieta 1/19/2024 1:44 PM Dictation workstation:   [id number]     Assessment/Plan   Septic shock-on pressors-remains on pressors  Left-sided pleural effusion   Left lower lobe pneumonia-treated  Proteus urinary tract infection-treated  History of MRSA endocarditis  History of C. 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

## 2024-02-08 NOTE — CARE PLAN
Problem: Respiratory  Goal: Minimize anxiety/maximize coping throughout shift  Outcome: Progressing  Goal: Minimal/no exertional discomfort or dyspnea this shift  Outcome: Progressing  Goal: Verbalize decreased shortness of breath this shift  Outcome: Progressing  Goal: No signs of respiratory distress (eg. Use of accessory muscles. Peds grunting)  Outcome: Progressing

## 2024-02-08 NOTE — PROGRESS NOTES
Ayanna Gross is a 73 y.o. female on day 20 of admission presenting with Septic shock (CMS/[facility]).    Critical Care Medicine Progress Note    Admitted on:     1/19/2024  Length of Stay: 20 day(s)     Interval History     Still on levo 0.08  No other complaints      Objective   Objective     Vitals:    02/07/24 0420   Weight: 101 kg (223 lb 1.7 oz)   Body mass index is 40.81 kg/m².        2/8/2024     4:30 PM 2/8/2024     4:45 PM 2/8/2024     5:00 PM 2/8/2024     5:15 PM 2/8/2024     5:30 PM 2/8/2024     5:45 PM 2/8/2024     6:00 PM   Vitals   Systolic 65 73 78 80 80 89 84   Diastolic 37 24 38 35 29 38 42   Heart Rate 105 105 104 104 102 106 104   Resp 21 20 19 18 17 19 19        Vent settings:       Intake/Output Summary (Last 24 hours) at 2/8/2024 1821  Last data filed at 2/8/2024 0800  Gross per 24 hour   Intake 377.41 ml   Output 80 ml   Net 297.41 ml     Physical exam:  -----------------  Awake and oriented to place and person  Cardiovascular:      Rate and Rhythm: Normal rate. Rhythm irregular.   Pulmonary:      Effort: Pulmonary effort is normal.   Abdominal:      General: Abdomen is flat.   Musculoskeletal:         General: Deformity present. Normal range of motion.      Cervical back: Normal range of motion.      Comments: Abscence of left hand first finger   Skin:     General: Skin is warm.      Capillary Refill: Capillary refill takes less than 2 seconds.      Comments: Skin tear healing to L hand  Wound to bilateral feet and sacrum     Medications     Scheduled Medications:   [Held by provider] apixaban, 2.5 mg, oral, BID  doxycylcine, 100 mg, oral, Daily  epoetin di or biosimilar, 10,000 Units, intravenous, Every Mon/Wed/Fri  escitalopram, 10 mg, oral, Nightly  fludrocortisone, 0.1 mg, oral, TID  gabapentin, 100 mg, oral, TID  heparin, 2,000 Units, intra-catheter, After Dialysis  heparin, 2,000 Units, intra-catheter, After Dialysis  heparin, 2,000 Units, intra-catheter, After Dialysis  heparin, 2,000  Units, intra-catheter, After Dialysis  insulin lispro, 0-10 Units, subcutaneous, TID with meals  ipratropium-albuteroL, 3 mL, nebulization, q6h while awake  lidocaine, 5 mL, infiltration, Once  midodrine, 15 mg, oral, TID with meals  nystatin, , Topical, BID  nystatin, , Topical, BID  pantoprazole, 40 mg, oral, Daily   Or  pantoprazole, 40 mg, intravenous, Daily  perflutren lipid microspheres, 0.5-10 mL of dilution, intravenous, Once in imaging  perflutren protein A microsphere, 0.5 mL, intravenous, Once in imaging  psyllium, 1 packet, oral, BID  sodium chloride, 3 mL, nebulization, TID  sulfur hexafluoride microsphr, 2 mL, intravenous, Once in imaging  sulfur hexafluoride microsphr, 2 mL, intravenous, Once in imaging  vancomycin, 125 mg, oral, BID  zinc oxide, 1 Application, Topical, BID       Continuous Medications:   norepinephrine, 0.01-3 mcg/kg/min, Last Rate: 0.02 mcg/kg/min (02/08/24 1900)       PRN Medications:     Labs     Results from last 72 hours   Lab Units 02/06/24  0454   GLUCOSE mg/dL 188*   SODIUM mmol/L 133   POTASSIUM mmol/L 4.1   CHLORIDE mmol/L 99   CO2 mmol/L 21*   BUN mg/dL 13   CREATININE mg/dL 2.40*   EGFR mL/min/1.73m*2 21*   CALCIUM mg/dL 8.5   ALBUMIN g/dL 2.5*   MAGNESIUM mg/dL 1.70     Results from last 72 hours   Lab Units 02/06/24  0454   ALK PHOS U/L 353*   ALT U/L 20   AST U/L 66*   BILIRUBIN TOTAL mg/dL 0.9   PROTEIN TOTAL g/dL 5.7*             Results from last 72 hours   Lab Units 02/06/24  0454   WBC AUTO x10*3/uL 11.1   NRBC AUTO /100 WBCs 0.0   RBC AUTO x10*6/uL 2.91*   HEMOGLOBIN g/dL 8.8*   HEMATOCRIT % 26.4*   MCV fL 91   MCH pg 30.2   MCHC g/dL 33.3   RDW % 22.7*   PLATELETS AUTO x10*3/uL 169         Lab Results   Component Value Date    BLOODCULT No growth at 4 days -  FINAL REPORT 01/19/2024    BLOODCULT No growth at 4 days -  FINAL REPORT 01/19/2024    GRAMSTAIN (A) 12/15/2023     Gram stain indicates specimen contains significant salivary contamination.     Lab Results    Component Value Date    URINECULTURE >100,000 Proteus mirabilis (A) 01/19/2024       Imaging and Diagnostic Studies     Lab/Radiology/Diagnostic Review:  Results for orders placed or performed during the hospital encounter of 01/19/24 (from the past 24 hour(s))   POCT GLUCOSE   Result Value Ref Range    POCT Glucose 239 (H) 74 - 99 mg/dL   POCT GLUCOSE   Result Value Ref Range    POCT Glucose 201 (H) 74 - 99 mg/dL   POCT GLUCOSE   Result Value Ref Range    POCT Glucose 246 (H) 74 - 99 mg/dL     Upper extremity venous duplex bilateral    Result Date: 1/25/2024           [facility] [id number] [place] [address]            Phone [phone]  Vascular Lab Report  VASC US UPPER EXTREMITY VENOUS DUPLEX BILATERAL Patient Name:      BASIA Gray Physician:  [id number] Mini Busby MD, RPVI Study Date:        1/25/2024           Ordering Provider:  [id number] ERI BERMAN MRN/PID:           [id number]            Fellow: Accession#:        [id number]        Technologist:       Dara Parr RVRICHIE Date of Birth/Age: 1950 / 73 years Technologist 2: Gender:            F                   Encounter#:         [id number] Admission Status:  Inpatient           Location Performed: [facility]  Diagnosis/ICD: Right arm swelling-M79.89; Left arm swelling-M79.89 CPT Codes:     93235 Peripheral venous duplex scan for DVT complete  CONCLUSIONS:  Right Upper Venous: No evidence of acute deep vein thrombus visualized in the right upper extremity. Internal jugular vein was visualized in segments due to IV lines and bandages. Left Upper Venous: No evidence of acute deep vein thrombus visualized in the left upper extremity. Subclavian stent is noted and appears patent. There is a known occluded dialysis access noted.  Additional Findings: Technically difficult exam due to IV lines, bandages, and patient's positioning.  Imaging &  Doppler Findings:  Right               Compressible Thrombus        Flow Internal Jugular        Yes        None   Spontaneous/Phasic Subclavian              Yes        None Subclavian Proximal     Yes        None   Spontaneous/Phasic Subclavian Mid          Yes        None Subclavian Distal       Yes        None   Spontaneous/Phasic Axillary                Yes        None       Pulsatile Brachial                Yes        None Cephalic                Yes        None Basilic                 Yes        None  Left                Compress Thrombus        Flow Internal Jugular      Yes      None       Pulsatile Subclavian            Yes      None Subclavian Proximal   Yes      None       Pulsatile Subclavian Mid        Yes      None       Pulsatile Subclavian Distal     Yes      None       Pulsatile Axillary              Yes      None   Spontaneous/Phasic Brachial              Yes      None Cephalic              Yes      None Basilic               Yes      None  [id number] Mini Busby MD, ALICE Electronically signed by [id number] ALICE Kelly MD on 1/25/2024 at 4:06:13 PM  ** Final **     ECG 12 lead    Result Date: 1/24/2024  Sinus tachycardia  with 1st degree AV block Right bundle branch block Possible Lateral infarct , age undetermined Abnormal ECG Confirmed by Yesika Nj ([id number]) on 1/24/2024 6:54:45 AM    XR tibia fibula right 2 views    Result Date: 1/22/2024  Interpreted By:  Real Mendieta, STUDY: XR TIBIA FIBULA RIGHT 2 VIEWS; 1/22/2024 2:15 pm   INDICATION: Signs/Symptoms:evaluate source of infection, osteomyelitis;   COMPARISON: None available.   ACCESSION NUMBER(S): [id number]   ORDERING CLINICIAN: BAN GIFFORD   TECHNIQUE: Views: AP and Lateral of the right tibia and fibula   FINDINGS: RESULT: There is no evidence for fracture or dislocation. Tricompartmental degenerative changes of the right knee. The tibia and fibula appear intact without bony erosion or evidence for osteomyelitis. No other bony or  soft tissue abnormality is identified. No subcutaneous gas is noted.       No evidence for acute osseous abnormality. No bony erosion to suggest osteomyelitis.   Signed by: Real Mendieta 1/22/2024 3:15 PM Dictation workstation:   [id number]    XR ankle right 3+ views    Result Date: 1/22/2024  Interpreted By:  Real Mendieta, STUDY: XR ANKLE RIGHT 3+ VIEWS; 1/22/2024 2:15 pm   INDICATION: Signs/Symptoms:Evuluate source of infection, osteomyelitis;   COMPARISON: None available.   ACCESSION NUMBER(S): [id number]   ORDERING CLINICIAN: BAN GIFFORD   TECHNIQUE: Views: AP, Lateral, Oblique, right ankle   FINDINGS: RESULT: There is no evidence for fracture or dislocation. The ankle mortise is intact. Joint spaces appear adequately maintained. No bony erosion to suggest osteomyelitis. No bone lesion or soft tissue abnormality is identified. No subcutaneous gas is seen.       No evidence for acute osseous abnormality. No bony erosion to suggest osteomyelitis.   Signed by: Real Mendieta 1/22/2024 3:14 PM Dictation workstation:   [id number]    Transthoracic Echo (TTE) Complete    Result Date: 1/22/2024           [place] [address]            Phone [phone] TRANSTHORACIC ECHOCARDIOGRAM REPORT  Patient Name:      BASIA Gray Physician:   [id number] Fausto [facility] DENNIS Study Date:        1/22/2024           Ordering Provider:   [id number] ERI BERMAN MRN/PID:           [id number]            Fellow: Accession#:        [id number]        Nurse: Date of Birth/Age: 1950 / 73 years Sonographer:         Tabatha Page RDCS Gender:            F                   Additional Staff: Height:            157.00 cm           Admit Date: Weight:            75.00 kg            Admission Status:    Inpatient - Routine BSA:               1.76 m2             Department Location: [facility] Blood Pressure: 88 /49  mmHg Study Type:    TRANSTHORACIC ECHO (TTE) COMPLETE Diagnosis/ICD: Chronic combined systolic (congestive) and diastolic (congestive)                heart failure (CHF)-I50.42; Sepsis, unspecified organism-A41.9 Indication:    heart failure,septic shock CPT Codes:     Echo Complete w Full Doppler-[id number] Patient History: BMI:               Obese >30 Pertinent History: Sepsis, PVD, A-Fib, CVA, Cancer and HTN. breast                    prosthesis/ca, ESRD,anemia,septic shock,heart failure. Study Detail: The following Echo studies were performed: 2D, M-Mode, Doppler and               color flow. Technically challenging study due to prosthesis,               prominent lung artifact and body habitus.  PHYSICIAN INTERPRETATION: Left Ventricle: Left ventricular systolic function is normal, with an estimated ejection fraction of 70%. There are no regional wall motion abnormalities. The left ventricular cavity size is normal. Left ventricular diastolic filling was indeterminate. Left Atrium: The left atrium is moderately dilated. Right Ventricle: The right ventricle is normal in size. There is normal right ventricular global systolic function. Right Atrium: The right atrium is normal in size. Aortic Valve: The aortic valve is trileaflet. There is no evidence of aortic valve regurgitation. The peak instantaneous gradient of the aortic valve is 2.8 mmHg. Mitral Valve: The mitral valve is normal in structure. There is no evidence of mitral valve regurgitation. Tricuspid Valve: The tricuspid valve is structurally normal. There is trace tricuspid regurgitation. Pulmonic Valve: The pulmonic valve is not well visualized. The pulmonic valve regurgitation was not well visualized. Pericardium: There is no pericardial effusion noted. Aorta: The aortic root is normal.  CONCLUSIONS:  1. Left ventricular systolic function is normal with a 70% estimated ejection fraction.  2. The left atrium is moderately dilated.  3. Left ventricular  diastolic filling indeterminate. QUANTITATIVE DATA SUMMARY: 2D MEASUREMENTS:                          Normal Ranges: LAs:           4.50 cm   (2.7-4.0cm) IVSd:          0.61 cm   (0.6-1.1cm) LVPWd:         0.88 cm   (0.6-1.1cm) LVIDd:         3.66 cm   (3.9-5.9cm) LV Mass Index: 41.9 g/m2 LV SYSTOLIC FUNCTION BY 2D PLANIMETRY (MOD):                     Normal Ranges: EF-A4C View: 68.3 % (>=55%) LV DIASTOLIC FUNCTION:                     Normal Ranges: MV Peak E: 1.19 m/s (0.7-1.2 m/s) MV Peak A: 0.90 m/s (0.42-0.7 m/s) E/A Ratio: 1.32     (1.0-2.2) MITRAL VALVE:                 Normal Ranges: MV DT: 192 msec (150-240msec) AORTIC VALVE:                         Normal Ranges: AoV Vmax:      0.84 m/s (<=1.7m/s) AoV Peak P.8 mmHg (<20mmHg) LVOT Max Shimon:  0.47 m/s (<=1.1m/s) LVOT Diameter: 1.90 cm  (1.8-2.4cm) AoV Area,Vmax: 1.57 cm2 (2.5-4.5cm2) TRICUSPID VALVE/RVSP:                   Normal Ranges: IVC Diam: 1.05 cm  [id number] [facility] Electronically signed on 2024 at 2:53:04 PM  ** Final **     XR foot left 3+ views    Result Date: 2024  Interpreted By:  Real Mendieta, STUDY: XR FOOT LEFT 1-2 VIEWS; 2024 4:15 pm   INDICATION: Signs/Symptoms:osteomyelitis. Pain   COMPARISON: 2023   ACCESSION NUMBER(S): [id number]   ORDERING CLINICIAN: BAN GIFFORD   TECHNIQUE: Views:  AP, Lat, Oblique, left foot   FINDINGS: RESULT: There is no evidence for fracture or dislocation. 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 Mild hammertoe deformities. Joint spaces appear adequately maintained. Soft tissue ulceration of the posterior aspect of the heel however no evidence for bony erosion to suggest osteomyelitis.       No evidence for acute fracture or acute bony erosion to suggest osteomyelitis. Chronic changes as described.   Signed by: Real Mendieta 2024 7:15 PM Dictation workstation:   [id number]    CT chest abdomen pelvis wo IV contrast    Result Date:  1/21/2024  Interpreted By:  Maria Garcia, STUDY: CT CHEST ABDOMEN PELVIS WO CONTRAST;  1/20/2024 11:42 pm   INDICATION: Mental status change. Elevated white blood cell count with infectious workup.   COMPARISON: 08/20/2023   ACCESSION NUMBER(S): [id number]   ORDERING CLINICIAN: BAN GIFFORD   TECHNIQUE: CT of the chest, abdomen and pelvis was performed with no oral or intravenous contrast administered. Sagittal and coronal reformations were completed by the technologist at the acquisition scanner.   All CT examinations are performed with 1 or more of the following dose reduction techniques: Automated exposure control, adjustment of mA and/or kv according to patient's size, or use of iterative reconstruction techniques.   FINDINGS: Please note that the study is limited without intravenous contrast.   CHEST: Bilateral pleural effusions are present with right effusion moderately small in size and with the left effusion moderately small in size as well. There is shift of the heart and mediastinum to the left of midline due to atelectasis of the left upper lobe. There is consolidation throughout left lower lobe with air bronchograms noted. On the right, there is compressive atelectasis seen posteriorly in the right lower lobe.   There is mild reactive mediastinal adenopathy seen at this time with mediastinal lymph nodes increased in size since prior study. Several of these mediastinal nodes are at the upper limits of normal measuring 8 mm in short axis diameter.   No pericardial effusion is present. The cardiac size is normal with mitral annulus calcification.   There has been prior bilateral mastectomy with reconstruction of the left breast with implant placement. Surgical clips are visible within the left axilla. Stent grafts are visible within the left upper extremity and within the left innominate, subclavian as well as axillary veins.   A peripherally calcified 1.8 cm nodule arises from the lower pole of left  thyroid lobe.   ABDOMEN:   LIVER: Unremarkable.   BILE DUCTS: No intrahepatic or extrahepatic biliary ductal dilatation is seen.   GALLBLADDER: Cholelithiasis is observed with some calcification of the gallbladder wall demonstrated as well. No gallbladder wall thickening or pericholecystic fluid is evident.   PANCREAS: Unremarkable   SPLEEN: Unremarkable   ADRENAL GLANDS: Unremarkable   KIDNEYS AND URETERS: Kidneys are small in size with extensive renal artery calcification demonstrated.   PELVIS:   BLADDER: Amos catheter is present within the decompressed urinary bladder.   REPRODUCTIVE ORGANS: Calcification of the arcuate arteries within the uterus is seen. There is no uterine enlargement or adnexal mass.   BOWEL: A rectal balloon is seen. There is no abnormal distention of the intestinal tract.   VESSELS: There is atherosclerosis of the thoracoabdominal aorta and iliac arteries with calcification in the walls of the celiac, splenic, hepatic, and mesenteric arteries.   PERITONEUM/RETROPERITONEUM/LYMPH NODES: There is a minimal amount of ascites seen about the liver and spleen with mild presacral edema noted.   ABDOMINAL WALL: There is anasarca involving the soft tissues of the abdomen and pelvis. Postoperative change from ventral hernia repair is seen.   BONES: Bilateral sacroiliitis is seen. There is lumbar dextroscoliosis. Chronic rotator cuff tear is present bilaterally with osteoarthritis of both shoulders, right greater than left.       Chest 1.  Moderately small bilateral pleural effusions with left upper lobe atelectasis and compressive atelectasis seen posteriorly in right lower lobe. A left lower lobe pneumonia is identified. There is extensive airspace consolidation within left lower lobe with air bronchograms observed. Mild mediastinal reactive adenopathy is present as well.   Abdomen-Pelvis 1.  Cholelithiasis without evidence of cholecystitis. 2. Small amount of ascites with mild presacral edema and  anasarca within the soft tissues of the abdominal wall. 3. Renal atrophy with extensive vascular calcifications.     MACRO: None   Signed by: Maria Garcia 1/21/2024 8:30 AM Dictation workstation:   [id number]    CT head wo IV contrast    Result Date: 1/21/2024  Interpreted By:  Maria Garcia, STUDY: CT HEAD WO IV CONTRAST 1/20/2024 11:42 pm   INDICATION: Signs/Symptoms:mental status changes   COMPARISON: 12/11/2023   ACCESSION NUMBER(S): [id number]   ORDERING CLINICIAN: BAN GIFFORD   TECHNIQUE: Unenhanced axial images of the brain are completed.   All CT examinations are performed with 1 or more of the following dose reduction techniques: Automated exposure control, adjustment of mA and/or kv according to patient's size, or use of iterative reconstruction techniques.   FINDINGS: Helical unenhanced axial images of the brain demonstrate a mild to moderate degree of ventricular enlargement with proportionate widening of the sulci and sylvian fissures. There is no midline shift, mass effect, extra-axial fluid collection, or acute intracranial hemorrhage. There is diminished density seen in the periventricular white matter indicating chronic microvascular ischemic disease. There is calcified plaque seen within the distal vertebral and internal carotid arteries bilaterally. No calvarial abnormality is seen.       Atrophy and chronic microvascular ischemic disease without acute intracranial process.   Signed by: Maria Garcia 1/21/2024 8:09 AM Dictation workstation:   [id number]    XR chest 1 view    Result Date: 1/20/2024  Interpreted By:  Brendon Perez, STUDY: XR CHEST 1 VIEW; 1/20/2024 5:03 pm   INDICATION: CLINICAL INFORMATION: Signs/Symptoms:Insertion right IJ central line, also left thoracentesis.   COMPARISON: 01/19/2024 at 1338 hours   ACCESSION NUMBER(S): [id number]   ORDERING CLINICIAN: BOZENA ANTONIO   TECHNIQUE: Portable chest one view.   FINDINGS: The cardiac size is indeterminate in view of the AP projection.  There is no change in the right-sided dual port central venous catheter. There is a new right internal jugular venous catheter present with the tip at approximately same level as the dual port central venous catheter, overlying the mid to lower right atrium. There is no evidence for pneumothorax. Patient has a history of left thoracentesis without evidence for pneumothorax on the left. There is bilateral basilar alveolar infiltrate and effusions. Left effusion is decreased compared to the study from 1 day earlier.   Surgical clips are identified within left chest wall. Endovascular stent is identified in the distribution of the left subclavian artery.       1. Status post right-sided central venous catheter placement as described above with the tip overlying the mid to caudal aspect of the right atrium. There is no evidence for pneumothorax. 2. No evidence for left-sided pneumothorax after left-sided thoracentesis. Decrease in the left effusion. 3. Bilateral basilar infiltrates and effusions are present. Follow-up to assure complete clearing is suggested.   MACRO: none   Signed by: Brendon Perez 1/20/2024 5:11 PM Dictation workstation:   [id number]    XR chest 1 view    Result Date: 1/19/2024  Interpreted By:  Real Mendieta, STUDY: XR CHEST 1 VIEW; 1/19/2024 1:38 pm   INDICATION: Signs/Symptoms:dyspnea.   COMPARISON: 12/26/2023   ACCESSION NUMBER(S): [id number]   ORDERING CLINICIAN: EMELY GOLDSMITH   TECHNIQUE: 1 view of the chest was performed.   FINDINGS: There may be a minimal right pleural effusion. Slight prominence of the interstitium otherwise the right lung is clear. Improved appearance of the left lung with improved aeration of the right upper lobe. There is opacification of the left lower lobe possibly due to large pleural effusion which is similar to the prior study. No pneumothorax. Right-sided dual lumen central line catheter with tip at the proximal atrium. The cardiomediastinal silhouette is  within normal limits. Left-sided subclavian stents are again noted.       Improved aeration and appearance of the left lung superiorly however there is a persistent large left pleural effusion and opacification of the left lower lobe.   Signed by: Real Mendieta 1/19/2024 1:44 PM Dictation workstation:   [id number]         Assessment / Plan       PROBLEM LIST:  - End stage heart failure with preserved EF - on vasopressors  - End stage renal disease on HD  - Cardiogenic shock - will continue to wean down levo  - Acute metabolic encephalopathy  - Failure to wean from vasopressors  - Goals of care discussion    MANAGEMENT PLAN:  - Start weaning levo and assess mental status  - Check lactate bid  - Start Fluorinef tid  - Discussed at length with daughter about the end stage nature of her disease with multiple organ failure  - Palliative care consult  - Cardiology consultation - patient's daughter declined a RHC  - Peer to peer done with insurance, NOT approved  - Family will update us with goals of care  - Cont Doxy and Vanc PO. Appreciate ID consult.  - Routine ICU care  - PT/OT    I have personally interviewed and examined the patient.  I have personally verified elements of the exam listed above. Interval changes or irregularities are as noted.  I have personally and independently reviewed laboratory, radiographic and procedural data.  I have personally reviewed the problem list above and concur. Changes, if any, are noted.  I have personally reviewed the plan list above and concur. Changes, if any, are noted.    The patient has high probability of compromise including but not limited to organ system failure, mechanical respiratory and circulatory support, cardiac arrest and death. I have discussed this in detail with the family / caregivers.    I have personally spent 50 minutes of face to face critical care time (not including billable procedures billed separately) in taking care of the patient.  I have  personally answered all questions to the satisfaction of the patient and / or family members to their satisfaction.        Minh Shane MD

## 2024-02-08 NOTE — PROGRESS NOTES
Patient not medically clear. Patient's family revisit goals of care on Monday. Palliative care following. Will follow.    **PATIENT DOES NOT HAVE A SAFE DISCHARGE PLAN      Shauna Jensen RN

## 2024-02-08 NOTE — PROGRESS NOTES
CONSULT PROGRESS NOTES    SERVICE DATE: 2/8/2024   SERVICE TIME: 3:11 PM    CONSULTING SERVICE: Nephrology    ASSESSMENT AND PLAN   1.  End-stage renal disease  2.  Hypotension  3.  Hyponatremia  4.  Hypokalemia  5.  Anemia of chronic kidney disease     Dialysis probably tomorrow with [facility] again with low temperature and using mild fluid removal.    Hyponatremia from volume overload in this patient with anuria.  Hypokalemia improved, now using 3K bath.  Hemoglobin is low, use Retacrit 10,000 units IV 3 times a week with dialysis.  She remains dependent on norepinephrine infusion otherwise her blood pressure drops dangerously low.  I have discussed this poor prognosis numerous times with the patient and her daughter.  The patient's goals remain to see her grandchildren is much as possible.  She does not seem ready for hospice care and withdrawal from dialysis.  She had been considered for LTAC for placement, but it sounds like this was denied by insurance.  She had a meeting with hospice recently, but it does not sound like she will approach this disposition.    She does not need, nor do I believe she would derive any benefit from CRRT.  Magnesium balance has improved.  None of her options for disposition are great.  She has a poor overall prognosis, but it has been difficult to get the patient and her daughter/family to understand this.  I have had numerous discussions with the patient about where to proceed from here, but her main goals continue to be to see her grandchildren as long as possible while she is still is without significant pain.  She remains full code, despite numerous attempts from ICU team, palliative care, and myself to redefine and be more realistic with her goals of care.    SUBJECTIVE  INTERVAL HPI: She has no new issues or concerns.  She remains hypotensive and on Levophed infusion.  She had dialysis yesterday, largely unremarkable.    MEDICATIONS:  [Held by provider] apixaban, 2.5 mg, oral,  BID  doxycylcine, 100 mg, oral, Daily  epoetin di or biosimilar, 10,000 Units, intravenous, Every Mon/Wed/Fri  escitalopram, 10 mg, oral, Nightly  fludrocortisone, 0.1 mg, oral, TID  gabapentin, 100 mg, oral, TID  heparin, 2,000 Units, intra-catheter, After Dialysis  heparin, 2,000 Units, intra-catheter, After Dialysis  heparin, 2,000 Units, intra-catheter, After Dialysis  heparin, 2,000 Units, intra-catheter, After Dialysis  insulin lispro, 0-10 Units, subcutaneous, TID with meals  ipratropium-albuteroL, 3 mL, nebulization, q6h while awake  lidocaine, 5 mL, infiltration, Once  midodrine, 15 mg, oral, TID with meals  nystatin, , Topical, BID  nystatin, , Topical, BID  pantoprazole, 40 mg, oral, Daily   Or  pantoprazole, 40 mg, intravenous, Daily  perflutren lipid microspheres, 0.5-10 mL of dilution, intravenous, Once in imaging  perflutren protein A microsphere, 0.5 mL, intravenous, Once in imaging  psyllium, 1 packet, oral, BID  sodium chloride, 3 mL, nebulization, TID  sulfur hexafluoride microsphr, 2 mL, intravenous, Once in imaging  sulfur hexafluoride microsphr, 2 mL, intravenous, Once in imaging  vancomycin, 125 mg, oral, BID  zinc oxide, 1 Application, Topical, BID       norepinephrine, 0.01-3 mcg/kg/min, Last Rate: 0.05 mcg/kg/min (02/08/24 1340)       PRN medications: acetaminophen, alteplase, ammonium lactate, benzocaine-menthoL, dextrose 10 % in water (D10W), dextrose, glucagon, ondansetron, oxygen, sennosides-docusate sodium     OBJECTIVE  PHYSICAL EXAM:   Heart Rate:  []   Temp:  [36.4 °C (97.5 °F)]   Resp:  [16-24]   BP: ()/()   SpO2:  [74 %-96 %]   Body mass index is 40.81 kg/m².  Chronically ill-appearing elderly white woman  Pale skin  Right-sided hand swelling  Left-sided finger amputation  Internal jugular tunneled hemodialysis catheter in place  There is no significant pretibial edema on both lower extremities  Soft abdomen  No Amos  No obvious joint deformities  Moist  mucosa  Hearing seems to be intact  Phonation intact  Rectal tube in place       DATA:   Labs:  Results for orders placed or performed during the hospital encounter of 01/19/24 (from the past 96 hour(s))   POCT GLUCOSE   Result Value Ref Range    POCT Glucose 166 (H) 74 - 99 mg/dL   POCT GLUCOSE   Result Value Ref Range    POCT Glucose 168 (H) 74 - 99 mg/dL   CBC   Result Value Ref Range    WBC 11.7 (H) 4.4 - 11.3 x10*3/uL    nRBC 0.0 0.0 - 0.0 /100 WBCs    RBC 3.13 (L) 4.00 - 5.20 x10*6/uL    Hemoglobin 9.5 (L) 12.0 - 16.0 g/dL    Hematocrit 31.4 (L) 36.0 - 46.0 %     80 - 100 fL    MCH 30.4 26.0 - 34.0 pg    MCHC 30.3 (L) 32.0 - 36.0 g/dL    RDW 24.1 (H) 11.5 - 14.5 %    Platelets 259 150 - 450 x10*3/uL   Comprehensive metabolic panel   Result Value Ref Range    Glucose 192 (H) 65 - 99 mg/dL    Sodium 128 (L) 133 - 145 mmol/L    Potassium 4.9 3.4 - 5.1 mmol/L    Chloride 97 97 - 107 mmol/L    Bicarbonate 16 (L) 24 - 31 mmol/L    Urea Nitrogen 21 8 - 25 mg/dL    Creatinine 3.40 (H) 0.40 - 1.60 mg/dL    eGFR 14 (L) >60 mL/min/1.73m*2    Calcium 8.5 8.5 - 10.4 mg/dL    Albumin 2.4 (L) 3.5 - 5.0 g/dL    Alkaline Phosphatase 234 (H) 35 - 125 U/L    Total Protein 5.6 (L) 5.9 - 7.9 g/dL    AST 29 5 - 40 U/L    Bilirubin, Total 0.8 0.1 - 1.2 mg/dL    ALT 12 5 - 40 U/L    Anion Gap 15 <=19 mmol/L   Magnesium   Result Value Ref Range    Magnesium 2.10 1.60 - 3.10 mg/dL   POCT GLUCOSE   Result Value Ref Range    POCT Glucose 178 (H) 74 - 99 mg/dL   POCT GLUCOSE   Result Value Ref Range    POCT Glucose 196 (H) 74 - 99 mg/dL   POCT GLUCOSE   Result Value Ref Range    POCT Glucose 132 (H) 74 - 99 mg/dL   POCT GLUCOSE   Result Value Ref Range    POCT Glucose 187 (H) 74 - 99 mg/dL   CBC   Result Value Ref Range    WBC 11.1 4.4 - 11.3 x10*3/uL    nRBC 0.0 0.0 - 0.0 /100 WBCs    RBC 2.91 (L) 4.00 - 5.20 x10*6/uL    Hemoglobin 8.8 (L) 12.0 - 16.0 g/dL    Hematocrit 26.4 (L) 36.0 - 46.0 %    MCV 91 80 - 100 fL    MCH 30.2 26.0  - 34.0 pg    MCHC 33.3 32.0 - 36.0 g/dL    RDW 22.7 (H) 11.5 - 14.5 %    Platelets 169 150 - 450 x10*3/uL   Comprehensive metabolic panel   Result Value Ref Range    Glucose 188 (H) 65 - 99 mg/dL    Sodium 133 133 - 145 mmol/L    Potassium 4.1 3.4 - 5.1 mmol/L    Chloride 99 97 - 107 mmol/L    Bicarbonate 21 (L) 24 - 31 mmol/L    Urea Nitrogen 13 8 - 25 mg/dL    Creatinine 2.40 (H) 0.40 - 1.60 mg/dL    eGFR 21 (L) >60 mL/min/1.73m*2    Calcium 8.5 8.5 - 10.4 mg/dL    Albumin 2.5 (L) 3.5 - 5.0 g/dL    Alkaline Phosphatase 353 (H) 35 - 125 U/L    Total Protein 5.7 (L) 5.9 - 7.9 g/dL    AST 66 (H) 5 - 40 U/L    Bilirubin, Total 0.9 0.1 - 1.2 mg/dL    ALT 20 5 - 40 U/L    Anion Gap 13 <=19 mmol/L   Magnesium   Result Value Ref Range    Magnesium 1.70 1.60 - 3.10 mg/dL   POCT GLUCOSE   Result Value Ref Range    POCT Glucose 162 (H) 74 - 99 mg/dL   POCT GLUCOSE   Result Value Ref Range    POCT Glucose 147 (H) 74 - 99 mg/dL   Transthoracic Echo (TTE) Limited   Result Value Ref Range    AV pk david 1.15 m/s    LVOT diam 1.90 cm    LV biplane EF 68 %    LA vol index A/L 31.3 ml/m2    LVIDd 2.98 cm    AV pk grad 5.3 mmHg    Aortic Valve Area by Continuity of Peak Velocity 2.74 cm2    LV A4C EF 68.2    POCT GLUCOSE   Result Value Ref Range    POCT Glucose 224 (H) 74 - 99 mg/dL   POCT GLUCOSE   Result Value Ref Range    POCT Glucose 177 (H) 74 - 99 mg/dL   POCT GLUCOSE   Result Value Ref Range    POCT Glucose 186 (H) 74 - 99 mg/dL   POCT GLUCOSE   Result Value Ref Range    POCT Glucose 173 (H) 74 - 99 mg/dL   POCT GLUCOSE   Result Value Ref Range    POCT Glucose 216 (H) 74 - 99 mg/dL   Transthoracic Echo (TTE) Limited   Result Value Ref Range    MV E/A ratio 1.10     BSA 2.1 m2   POCT GLUCOSE   Result Value Ref Range    POCT Glucose 239 (H) 74 - 99 mg/dL   POCT GLUCOSE   Result Value Ref Range    POCT Glucose 201 (H) 74 - 99 mg/dL         SIGNATURE: Saeed Mondragon MD PATIENT NAME: Ayanna Gross   DATE: February 8, 2024 MRN:  [id number]   TIME: 3:11 PM PAGER: [phone]

## 2024-02-08 NOTE — NURSING NOTE
Patient with Rt arm dual lumen picc, dressing D&I, 1 lumen in use, other lumen flushes easily and with positive blood return, curos cap applied. Rt chest dialysis catheters D&I.

## 2024-02-08 NOTE — PROGRESS NOTES
Ayanna Gross is a 73 y.o. female on day 19 of admission presenting with Septic shock (CMS/HCC).    Subjective   Interval History:   Awake, alert  Fecal management system in place  On pressors    Review of Systems   All other systems reviewed and are negative.      Objective   Range of Vitals (last 24 hours)  Heart Rate:  []   Temp:  [35.8 °C (96.4 °F)-37 °C (98.6 °F)]   Resp:  [13-31]   BP: ()/()   Weight:  [101 kg (223 lb 1.7 oz)]   SpO2:  [80 %-95 %]   Daily Weight  02/07/24 : 101 kg (223 lb 1.7 oz)    Body mass index is 40.81 kg/m².    Physical Exam  Constitutional:       Appearance: Normal appearance.   HENT:      Head: Normocephalic and atraumatic.      Nose: Nose normal.   Eyes:      Extraocular Movements: Extraocular movements intact.      Conjunctiva/sclera: Conjunctivae normal.   Cardiovascular:      Heart sounds: Normal heart sounds, S1 normal and S2 normal.   Pulmonary:      Breath sounds: Decreased breath sounds present.   Abdominal:      General: Bowel sounds are normal.      Palpations: Abdomen is soft.   Musculoskeletal:      Cervical back: Normal range of motion and neck supple.   Skin:     Comments: Stage III sacral ulcer, bilateral posterior heel eschar -photos examined  Neurological:      Mental Status: She is alert.     Antibiotics  aspirin tablet 325 mg  acetaminophen (Tylenol) tablet 650 mg  cefepime (Maxipime) 1 g in dextrose 5 % 50 mL IV  sodium chloride 0.9 % bolus 2,229 mL  apixaban (Eliquis) tablet 2.5 mg  escitalopram (Lexapro) tablet 10 mg  febuxostat (Uloric) tablet 40 mg  fenofibrate (Triglide) tablet 160 mg  gabapentin (Neurontin) capsule 100 mg  midodrine (Proamatine) tablet 15 mg  nystatin (Mycostatin) 100,000 unit/gram powder  oxyCODONE-acetaminophen (Percocet) 5-325 mg per tablet 1 tablet  simvastatin (Zocor) tablet 20 mg  piperacillin-tazobactam-dextrose (Zosyn) IV 2.25 g  vancomycin (Vancocin) capsule 125 mg  oxygen (O2) therapy  dextrose 50 % injection 25  g  glucagon (Glucagen) injection 1 mg  dextrose 10 % in water (D10W) infusion  insulin lispro (HumaLOG) injection 0-10 Units  nystatin (Mycostatin) 100,000 unit/gram powder 1 Application  vancomycin-diluent combo no.1 (Xellia) IVPB 1,750 mg  piperacillin-tazobactam-dextrose (Zosyn) IV 2.25 g  sodium chloride 0.9 % bolus 500 mL  norepinephrine (Levophed) 8 mg in dextrose 5% 250 mL (0.032 mg/mL) infusion (premix)  vancomycin (Vancocin) placeholder  pantoprazole (ProtoNix) EC tablet 40 mg  pantoprazole (ProtoNix) injection 40 mg  sennosides-docusate sodium (Judy-Colace) 8.6-50 mg per tablet 1 tablet  doxycycline (Vibramycin) in dextrose 5 % in water (D5W) 100 mL  mg  LORazepam (Ativan) injection 2 mg  magnesium sulfate IV 2 g  zinc oxide 20 % ointment 1 Application  nystatin (Mycostatin) cream  norepinephrine (Levophed) 8 mg in dextrose 5% 250 mL (0.032 mg/mL) infusion (premix)  heparin 1,000 unit/mL injection 2,000 Units  heparin 1,000 unit/mL injection 2,000 Units  nystatin (Mycostatin) ointment  acetaminophen (Tylenol) oral liquid 1,000 mg  albumin human 25 % solution 12.5 g  perflutren lipid microspheres (Definity) injection 0.5-10 mL of dilution  sulfur hexafluoride microsphr (Lumason) injection 24.28 mg  perflutren protein A microsphere (Optison) injection 0.5 mL  ipratropium-albuteroL (Duo-Neb) 0.5-2.5 mg/3 mL nebulizer solution 3 mL  sodium chloride 3 % nebulizer solution 3 mL  vancomycin-diluent combo no.1 (Xellia) IVPB 750 mg  sennosides-docusate sodium (Judy-Colace) 8.6-50 mg per tablet 1 tablet  acetaminophen (Tylenol) tablet 650 mg  doxycycline (Vibramycin) capsule 100 mg  magnesium oxide (Mag-Ox) tablet 400 mg  vasopressin (Vasostrict) 0.2 unit/mL infusion  norepinephrine (Levophed) 8 mg in dextrose 5% 250 mL (0.032 mg/mL) infusion (premix)  heparin 1,000 unit/mL injection 2,000 Units  heparin 1,000 unit/mL injection 2,000 Units  albumin human 25 % solution 12.5 g  vancomycin (Xellia) 1 g in 200 mL  (Xellia) IVPB 1 g  ipratropium-albuteroL (Duo-Neb) 0.5-2.5 mg/3 mL nebulizer solution 3 mL  sodium chloride 3 % nebulizer solution 3 mL  albumin human 5 % infusion 12.5 g  heparin 1,000 unit/mL injection 2,000 Units  heparin 1,000 unit/mL injection 2,000 Units  vancomycin (Vancocin) capsule 125 mg  albumin human 25 % solution 12.5 g  heparin 1,000 unit/mL injection 2,000 Units  heparin 1,000 unit/mL injection 2,000 Units  ipratropium-albuteroL (Duo-Neb) 0.5-2.5 mg/3 mL nebulizer solution 3 mL  lidocaine (Xylocaine) 10 mg/mL (1 %) injection 50 mg  sodium phosphate 15 mmol in sodium chloride 0.9% 250 mL IV  sod phos di, mono-K phos mono (K Phos Neutral) tablet 250 mg  potassium, sodium phosphates (Phos-NaK) 280-160-250 mg packet 1 packet  doxycycline (Vibramycin) capsule 100 mg  fludrocortisone (Florinef) tablet 0.1 mg  gabapentin (Neurontin) capsule 100 mg  acetaminophen (Tylenol) tablet 1,000 mg  benzocaine-menthoL (Dermoplast) topical spray  vancomycin (Vancocin) capsule 125 mg  heparin 1,000 unit/mL injection 2,000 Units  heparin 1,000 unit/mL injection 2,000 Units  magnesium sulfate IV 2 g  albumin human 25 % solution 12.5 g  promethazine (Phenergan) injection 12.5 mg  ondansetron (Zofran) injection 4 mg  lidocaine (Xylocaine) 10 mg/mL (1 %) injection 50 mg  alteplase (Cathflo Activase) injection 2 mg  acetaminophen (Tylenol) tablet 650 mg  psyllium (Metamucil) 3.4 gram packet 1 packet  perflutren lipid microspheres (Definity) injection 0.5-10 mL of dilution  sulfur hexafluoride microsphr (Lumason) injection 24.28 mg  perflutren protein A microsphere (Optison) injection 0.5 mL  epoetin di-epbx (Retacrit) injection 10,000 Units  sulfur hexafluoride microsphr (Lumason) injection 24.28 mg  perflutren lipid microspheres (Definity) injection 0.5-10 mL of dilution  perflutren lipid microspheres (Definity) injection 0.5-10 mL of dilution  sulfur hexafluoride microsphr (Lumason) injection 24.28 mg  perflutren protein A  microsphere (Optison) injection 0.5 mL  ammonium lactate (Lac-Hydrin) 12 % lotion 1 Application      Relevant Results  Labs  Results from last 72 hours   Lab Units 02/06/24  0454 02/05/24  0506   WBC AUTO x10*3/uL 11.1 11.7*   HEMOGLOBIN g/dL 8.8* 9.5*   HEMATOCRIT % 26.4* 31.4*   PLATELETS AUTO x10*3/uL 169 259     Results from last 72 hours   Lab Units 02/06/24  0454 02/05/24  0506   SODIUM mmol/L 133 128*   POTASSIUM mmol/L 4.1 4.9   CHLORIDE mmol/L 99 97   CO2 mmol/L 21* 16*   BUN mg/dL 13 21   CREATININE mg/dL 2.40* 3.40*   GLUCOSE mg/dL 188* 192*   CALCIUM mg/dL 8.5 8.5   ANION GAP mmol/L 13 15   EGFR mL/min/1.73m*2 21* 14*     Results from last 72 hours   Lab Units 02/06/24  0454 02/05/24  0506   ALK PHOS U/L 353* 234*   BILIRUBIN TOTAL mg/dL 0.9 0.8   PROTEIN TOTAL g/dL 5.7* 5.6*   ALT U/L 20 12   AST U/L 66* 29   ALBUMIN g/dL 2.5* 2.4*     Estimated Creatinine Clearance: 23.9 mL/min (A) (by C-G formula based on SCr of 2.4 mg/dL (H)).  C-Reactive Protein   Date Value Ref Range Status   12/25/2023 5.20 (H) 0.00 - 2.00 mg/dL Final     CRP   Date Value Ref Range Status   06/23/2023 19.9 (H) 0 - 2.0 MG/DL Final     Comment:     Performed at [address] [address]   05/22/2022 1.0 0 - 2.0 MG/DL Final     Comment:     Performed at [address] [address]     Microbiology  Reviewed  Imaging  Transthoracic Echo (TTE) Limited    Result Date: 2/7/2024           [place] [id number]            Phone [phone] TRANSTHORACIC ECHOCARDIOGRAM REPORT  Patient Name:      BASIA Gray Physician:    [id number] Herminio Littlejohn DO Study Date:        2/7/2024            Ordering Provider:    [id number] HERMINIO LITTLEJOHN MRN/PID:           [id number]            Fellow: Accession#:        [id number]        Nurse: Rosie  of Birth/Age: 1950 / 73 years Sonographer:          Rena Talavera ACS,                                                              RDCS, FABIAN Gender:            F                   Additional Staff: Height:            157.48 cm           Admit Date: Weight:                                Admission Status:     Inpatient - Routine BSA:               m2                  Department Location:  [facility] Blood Pressure: 95 /42 mmHg Study Type:    TRANSTHORACIC ECHO (TTE) LIMITED Diagnosis/ICD: Hypotension, unspecified-I95.9; Mitral valve disorder-I05.9 Indication:    Limited to assess for MS, Hypotension CPT Codes:     Echo Limited-56207; Color Doppler-71273; Doppler Limited-67137 Patient History: Pertinent History: Hypotension, hx of MV endocarditis, PAD, DM2 ESRD on Hd. Study Detail: The following Echo studies were performed: 2D, M-Mode, Doppler and               color flow. Technically challenging study due to poor acoustic               windows and L Breast implant. Unable to obtain suprasternal notch               view.  PHYSICIAN INTERPRETATION: Left Ventricle: Left ventricular systolic function is normal. There are no regional wall motion abnormalities. The left ventricular cavity size is normal. Left ventricular diastolic filling was not assessed. Left Atrium: The left atrium is moderate to severely dilated. Right Ventricle: The right ventricle is normal in size. There is normal right ventricular global systolic function. Right Atrium: The right atrium was not well visualized. Aortic Valve: The aortic valve was not assessed. Aortic valve regurgitation was not assessed. Mitral Valve: The mitral valve is abnormal. There is evidence of mild mitral valve stenosis. The doppler estimated mean and peak diastolic pressure gradients are 4.0 mmHg and 6.6 mmHg respectively. There is severe mitral annular calcification. There is no evidence of  mitral valve regurgitation. Tricuspid Valve: The tricuspid valve was not assessed. Tricuspid regurgitation was not assessed. Pulmonic Valve: The pulmonic valve is not well visualized. The pulmonic valve regurgitation was not assessed. Pericardium: There is no pericardial effusion noted. Aorta: The aortic root was not assessed.  CONCLUSIONS:  1. Left ventricular systolic function is normal.  2. The left atrium is moderate to severely dilated.  3. There is severe mitral annular calcification. QUANTITATIVE DATA SUMMARY: LV DIASTOLIC FUNCTION:                        Normal Ranges: MV Peak E:    1.23 m/s (0.7-1.2 m/s) MV Peak A:    1.12 m/s (0.42-0.7 m/s) E/A Ratio:    1.10     (1.0-2.2) MV lateral e' 0.04 m/s MV medial e'  0.07 m/s MITRAL VALVE:                      Normal Ranges: MV Vmax:    1.28 m/s (<=1.3m/s) MV peak P.6 mmHg (<5mmHg) MV mean P.0 mmHg (<48mmHg)  RIGHT VENTRICLE: RV Basal 2.97 cm  [id number] Lemus DO Electronically signed on 2024 at 3:50:21 PM  ** Final **     Transthoracic Echo (TTE) Limited    Result Date: 2024           [place] [address]            Phone [phone] TRANSTHORACIC ECHOCARDIOGRAM REPORT  Patient Name:      BASIA Gray Physician:    [id number] Lemus DO Study Date:        2024            Ordering Provider:    [id number] REBECA RESENDEZ MRN/PID:           [id number]            Fellow: Accession#:        [id number]        Nurse: Date of Birth/Age: 1950 / 73 years Sonographer:          Jennifer WALL Gender:            F                   Additional Staff: Height:            157.48 cm           Admit Date:           2024 Weight:            98.88 kg            Admission Status:     Inpatient - Routine BSA:               1.98 m2             Department Location: [facility]  Pressure: 59 /49 mmHg Study Type:    TRANSTHORACIC ECHO (TTE) LIMITED Diagnosis/ICD: Acute on chronic diastolic (congestive) heart failure                (CHF)-I50.33 Indication:    Acute on chronic diastolic congestive heart failure CPT Codes:     Echo Limited-69611; Color Doppler-72503; Doppler Limited-35014 Patient History: Pertinent History: PAF Peripheralvascular disease low blood pressure HTN Breast                    Cancer Mixed Hyperlipidemia HF CVA Hypotension DMII CAD CKD. Study Detail: The following Echo studies were performed: 2D, M-Mode, Doppler and               color flow. Technically challenging study due to body habitus,               patient lying in supine position, poor acoustic windows, prominent               lung artifact and Breast Prosthesis. Definity used as a contrast               agent for endocardial border definition. Unable to obtain               suprasternal notch view.  PHYSICIAN INTERPRETATION: Left Ventricle: Left ventricular systolic function is normal, with an estimated ejection fraction of 60-65%. There are no regional wall motion abnormalities. The left ventricular cavity size is normal. Left ventricular diastolic filling was indeterminate. Left Atrium: The left atrium is moderately dilated. Right Ventricle: The right ventricle is normal in size. There is normal right ventricular global systolic function. Right Atrium: The right atrium is normal in size. Aortic Valve: The aortic valve is trileaflet. There is no evidence of aortic valve regurgitation. The peak instantaneous gradient of the aortic valve is 5.3 mmHg. Mitral Valve: The mitral valve is normal in structure. There is severe mitral annular calcification. There is trace mitral valve regurgitation. Tricuspid Valve: The tricuspid valve is structurally normal. There is mild tricuspid regurgitation. Pulmonic Valve: The pulmonic valve is not well visualized. The pulmonic valve regurgitation was not well visualized.  Pericardium: There is no pericardial effusion noted. Aorta: The aortic root is normal.  CONCLUSIONS:  1. Left ventricular systolic function is normal with a 60-65% estimated ejection fraction.  2. The left atrium is moderately dilated.  3. There is severe mitral annular calcification. QUANTITATIVE DATA SUMMARY: 2D MEASUREMENTS:                          Normal Ranges: LAs:           3.80 cm   (2.7-4.0cm) IVSd:          0.92 cm   (0.6-1.1cm) LVPWd:         1.09 cm   (0.6-1.1cm) LVIDd:         2.98 cm   (3.9-5.9cm) LVIDs:         2.12 cm LV Mass Index: 41.2 g/m2 LV % FS        28.9 % LA VOLUME:                               Normal Ranges: LA Vol A4C:        58.3 ml    (22+/-6mL/m2) LA Vol A2C:        60.9 ml LA Vol BP:         62.1 ml LA Vol Index A4C:  29.4ml/m2 LA Vol Index A2C:  30.7 ml/m2 LA Vol Index BP:   31.3 ml/m2 LA Area A4C:       20.5 cm2 LA Area A2C:       20.1 cm2 LA Major Axis A4C: 6.1 cm LA Major Axis A2C: 5.6 cm LA Volume Index:   29.3 ml/m2 LA Vol A4C:        53.1 ml LA Vol A2C:        58.4 ml LV SYSTOLIC FUNCTION BY 2D PLANIMETRY (MOD):                     Normal Ranges: EF-A4C View: 68.2 % (>=55%) EF-A2C View: 65.9 % EF-Biplane:  67.8 % AORTIC VALVE:                         Normal Ranges: AoV Vmax:      1.15 m/s (<=1.7m/s) AoV Peak P.3 mmHg (<20mmHg) LVOT Max Shimon:  1.11 m/s (<=1.1m/s) LVOT VTI:      16.20 cm LVOT Diameter: 1.90 cm  (1.8-2.4cm) AoV Area,Vmax: 2.74 cm2 (2.5-4.5cm2) PULMONIC VALVE:                         Normal Ranges: PV Accel Time: 63 msec  (>120ms) PV Max Shimon:    1.0 m/s  (0.6-0.9m/s) PV Max P.3 mmHg  [id number] Herminio Lemus DO Electronically signed on 2024 at 7:58:25 AM  ** Final **     Upper extremity venous duplex bilateral    Result Date: 2024           [place] [id number]            Phone [phone]  Vascular Lab Report  VASC US UPPER EXTREMITY VENOUS DUPLEX BILATERAL Patient Name:      BASIA VEGA       Reading Physician:  [id number] Mini Busby MD, RPVI Study Date:        1/25/2024           Ordering Provider:  [id number] ERI BERMAN MRN/PID:           [id number]            Fellow: Accession#:        [id number]        Technologist:       Dara Parr RVRICHIE Date of Birth/Age: 1950 / 73 years Technologist 2: Gender:            F                   Encounter#:         [id number] Admission Status:  Inpatient           Location Performed: [facility]  Diagnosis/ICD: Right arm swelling-M79.89; Left arm swelling-M79.89 CPT Codes:     91230 Peripheral venous duplex scan for DVT complete  CONCLUSIONS:  Right Upper Venous: No evidence of acute deep vein thrombus visualized in the right upper extremity. Internal jugular vein was visualized in segments due to IV lines and bandages. Left Upper Venous: No evidence of acute deep vein thrombus visualized in the left upper extremity. Subclavian stent is noted and appears patent. There is a known occluded dialysis access noted.  Additional Findings: Technically difficult exam due to IV lines, bandages, and patient's positioning.  Imaging & Doppler Findings:  Right               Compressible Thrombus        Flow Internal Jugular        Yes        None   Spontaneous/Phasic Subclavian              Yes        None Subclavian Proximal     Yes        None   Spontaneous/Phasic Subclavian Mid          Yes        None Subclavian Distal       Yes        None   Spontaneous/Phasic Axillary                Yes        None       Pulsatile Brachial                Yes        None Cephalic                Yes        None Basilic                 Yes        None  Left                Compress Thrombus        Flow Internal Jugular      Yes      None       Pulsatile Subclavian            Yes      None Subclavian Proximal   Yes      None       Pulsatile Subclavian Mid        Yes      None       Pulsatile Subclavian Distal     Yes       None       Pulsatile Axillary              Yes      None   Spontaneous/Phasic Brachial              Yes      None Cephalic              Yes      None Basilic               Yes      None  [id number] Mini Busby MD, ALICE Electronically signed by [id number] ALICE Kelly MD on 1/25/2024 at 4:06:13 PM  ** Final **     ECG 12 lead    Result Date: 1/24/2024  Sinus tachycardia  with 1st degree AV block Right bundle branch block Possible Lateral infarct , age undetermined Abnormal ECG Confirmed by Yesika Nj ([id number]) on 1/24/2024 6:54:45 AM    XR tibia fibula right 2 views    Result Date: 1/22/2024  Interpreted By:  Real Mendieta, STUDY: XR TIBIA FIBULA RIGHT 2 VIEWS; 1/22/2024 2:15 pm   INDICATION: Signs/Symptoms:evaluate source of infection, osteomyelitis;   COMPARISON: None available.   ACCESSION NUMBER(S): [id number]   ORDERING CLINICIAN: BAN GIFFORD   TECHNIQUE: Views: AP and Lateral of the right tibia and fibula   FINDINGS: RESULT: There is no evidence for fracture or dislocation. Tricompartmental degenerative changes of the right knee. The tibia and fibula appear intact without bony erosion or evidence for osteomyelitis. No other bony or soft tissue abnormality is identified. No subcutaneous gas is noted.       No evidence for acute osseous abnormality. No bony erosion to suggest osteomyelitis.   Signed by: Real Mendieta 1/22/2024 3:15 PM Dictation workstation:   [id number]    XR ankle right 3+ views    Result Date: 1/22/2024  Interpreted By:  Real Mendieta, STUDY: XR ANKLE RIGHT 3+ VIEWS; 1/22/2024 2:15 pm   INDICATION: Signs/Symptoms:Evuluate source of infection, osteomyelitis;   COMPARISON: None available.   ACCESSION NUMBER(S): [id number]   ORDERING CLINICIAN: BAN GIFFORD   TECHNIQUE: Views: AP, Lateral, Oblique, right ankle   FINDINGS: RESULT: There is no evidence for fracture or dislocation. The ankle mortise is intact. Joint spaces appear adequately maintained. No bony erosion to suggest  osteomyelitis. No bone lesion or soft tissue abnormality is identified. No subcutaneous gas is seen.       No evidence for acute osseous abnormality. No bony erosion to suggest osteomyelitis.   Signed by: Real Mendieta 1/22/2024 3:14 PM Dictation workstation:   [id number]    Transthoracic Echo (TTE) Complete    Result Date: 1/22/2024           [place] [id number]            Phone [phone] TRANSTHORACIC ECHOCARDIOGRAM REPORT  Patient Name:      BASIA GUAN GARY      Reading Physician:   [id number] Fausto Rowe DO Study Date:        1/22/2024           Ordering Provider:   [id number] ERI BERMAN MRN/PID:           [id number]            Fellow: Accession#:        [id number]        Nurse: Date of Birth/Age: 1950 / 73 years Sonographer:         Tabatha Page RDCS Gender:            F                   Additional Staff: Height:            157.00 cm           Admit Date: Weight:            75.00 kg            Admission Status:    Inpatient - Routine BSA:               1.76 m2             Department Location: [facility] Blood Pressure: 88 /49 mmHg Study Type:    TRANSTHORACIC ECHO (TTE) COMPLETE Diagnosis/ICD: Chronic combined systolic (congestive) and diastolic (congestive)                heart failure (CHF)-I50.42; Sepsis, unspecified organism-A41.9 Indication:    heart failure,septic shock CPT Codes:     Echo Complete w Full Doppler-67105 Patient History: BMI:               Obese >30 Pertinent History: Sepsis, PVD, A-Fib, CVA, Cancer and HTN. breast                    prosthesis/ca, ESRD,anemia,septic shock,heart failure. Study Detail: The following Echo studies were performed: 2D, M-Mode, Doppler and               color flow. Technically challenging study due to prosthesis,               prominent lung artifact and body habitus.  PHYSICIAN INTERPRETATION: Left Ventricle: Left ventricular systolic function is  normal, with an estimated ejection fraction of 70%. There are no regional wall motion abnormalities. The left ventricular cavity size is normal. Left ventricular diastolic filling was indeterminate. Left Atrium: The left atrium is moderately dilated. Right Ventricle: The right ventricle is normal in size. There is normal right ventricular global systolic function. Right Atrium: The right atrium is normal in size. Aortic Valve: The aortic valve is trileaflet. There is no evidence of aortic valve regurgitation. The peak instantaneous gradient of the aortic valve is 2.8 mmHg. Mitral Valve: The mitral valve is normal in structure. There is no evidence of mitral valve regurgitation. Tricuspid Valve: The tricuspid valve is structurally normal. There is trace tricuspid regurgitation. Pulmonic Valve: The pulmonic valve is not well visualized. The pulmonic valve regurgitation was not well visualized. Pericardium: There is no pericardial effusion noted. Aorta: The aortic root is normal.  CONCLUSIONS:  1. Left ventricular systolic function is normal with a 70% estimated ejection fraction.  2. The left atrium is moderately dilated.  3. Left ventricular diastolic filling indeterminate. QUANTITATIVE DATA SUMMARY: 2D MEASUREMENTS:                          Normal Ranges: LAs:           4.50 cm   (2.7-4.0cm) IVSd:          0.61 cm   (0.6-1.1cm) LVPWd:         0.88 cm   (0.6-1.1cm) LVIDd:         3.66 cm   (3.9-5.9cm) LV Mass Index: 41.9 g/m2 LV SYSTOLIC FUNCTION BY 2D PLANIMETRY (MOD):                     Normal Ranges: EF-A4C View: 68.3 % (>=55%) LV DIASTOLIC FUNCTION:                     Normal Ranges: MV Peak E: 1.19 m/s (0.7-1.2 m/s) MV Peak A: 0.90 m/s (0.42-0.7 m/s) E/A Ratio: 1.32     (1.0-2.2) MITRAL VALVE:                 Normal Ranges: MV DT: 192 msec (150-240msec) AORTIC VALVE:                         Normal Ranges: AoV Vmax:      0.84 m/s (<=1.7m/s) AoV Peak P.8 mmHg (<20mmHg) LVOT Max Shimon:  0.47 m/s (<=1.1m/s)  LVOT Diameter: 1.90 cm  (1.8-2.4cm) AoV Area,Vmax: 1.57 cm2 (2.5-4.5cm2) TRICUSPID VALVE/RVSP:                   Normal Ranges: IVC Diam: 1.05 cm  [id number] Fausto Rowe DO Electronically signed on 1/22/2024 at 2:53:04 PM  ** Final **     XR foot left 3+ views    Result Date: 1/21/2024  Interpreted By:  Real Mendieta, STUDY: XR FOOT LEFT 1-2 VIEWS; 1/21/2024 4:15 pm   INDICATION: Signs/Symptoms:osteomyelitis. Pain   COMPARISON: 12/07/2023   ACCESSION NUMBER(S): [id number]   ORDERING CLINICIAN: BAN GIFFORD   TECHNIQUE: Views:  AP, Lat, Oblique, left foot   FINDINGS: RESULT: There is no evidence for fracture or dislocation. 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 Mild hammertoe deformities. Joint spaces appear adequately maintained. Soft tissue ulceration of the posterior aspect of the heel however no evidence for bony erosion to suggest osteomyelitis.       No evidence for acute fracture or acute bony erosion to suggest osteomyelitis. Chronic changes as described.   Signed by: Real Mendieta 1/21/2024 7:15 PM Dictation workstation:   [id number]    CT chest abdomen pelvis wo IV contrast    Result Date: 1/21/2024  Interpreted By:  Maria Garcia, STUDY: CT CHEST ABDOMEN PELVIS WO CONTRAST;  1/20/2024 11:42 pm   INDICATION: Mental status change. Elevated white blood cell count with infectious workup.   COMPARISON: 08/20/2023   ACCESSION NUMBER(S): [id number]   ORDERING CLINICIAN: BAN GIFFORD   TECHNIQUE: CT of the chest, abdomen and pelvis was performed with no oral or intravenous contrast administered. Sagittal and coronal reformations were completed by the technologist at the acquisition scanner.   All CT examinations are performed with 1 or more of the following dose reduction techniques: Automated exposure control, adjustment of mA and/or kv according to patient's size, or use of iterative reconstruction techniques.   FINDINGS: Please note that the study is limited without intravenous  contrast.   CHEST: Bilateral pleural effusions are present with right effusion moderately small in size and with the left effusion moderately small in size as well. There is shift of the heart and mediastinum to the left of midline due to atelectasis of the left upper lobe. There is consolidation throughout left lower lobe with air bronchograms noted. On the right, there is compressive atelectasis seen posteriorly in the right lower lobe.   There is mild reactive mediastinal adenopathy seen at this time with mediastinal lymph nodes increased in size since prior study. Several of these mediastinal nodes are at the upper limits of normal measuring 8 mm in short axis diameter.   No pericardial effusion is present. The cardiac size is normal with mitral annulus calcification.   There has been prior bilateral mastectomy with reconstruction of the left breast with implant placement. Surgical clips are visible within the left axilla. Stent grafts are visible within the left upper extremity and within the left innominate, subclavian as well as axillary veins.   A peripherally calcified 1.8 cm nodule arises from the lower pole of left thyroid lobe.   ABDOMEN:   LIVER: Unremarkable.   BILE DUCTS: No intrahepatic or extrahepatic biliary ductal dilatation is seen.   GALLBLADDER: Cholelithiasis is observed with some calcification of the gallbladder wall demonstrated as well. No gallbladder wall thickening or pericholecystic fluid is evident.   PANCREAS: Unremarkable   SPLEEN: Unremarkable   ADRENAL GLANDS: Unremarkable   KIDNEYS AND URETERS: Kidneys are small in size with extensive renal artery calcification demonstrated.   PELVIS:   BLADDER: Amos catheter is present within the decompressed urinary bladder.   REPRODUCTIVE ORGANS: Calcification of the arcuate arteries within the uterus is seen. There is no uterine enlargement or adnexal mass.   BOWEL: A rectal balloon is seen. There is no abnormal distention of the intestinal  tract.   VESSELS: There is atherosclerosis of the thoracoabdominal aorta and iliac arteries with calcification in the walls of the celiac, splenic, hepatic, and mesenteric arteries.   PERITONEUM/RETROPERITONEUM/LYMPH NODES: There is a minimal amount of ascites seen about the liver and spleen with mild presacral edema noted.   ABDOMINAL WALL: There is anasarca involving the soft tissues of the abdomen and pelvis. Postoperative change from ventral hernia repair is seen.   BONES: Bilateral sacroiliitis is seen. There is lumbar dextroscoliosis. Chronic rotator cuff tear is present bilaterally with osteoarthritis of both shoulders, right greater than left.       Chest 1.  Moderately small bilateral pleural effusions with left upper lobe atelectasis and compressive atelectasis seen posteriorly in right lower lobe. A left lower lobe pneumonia is identified. There is extensive airspace consolidation within left lower lobe with air bronchograms observed. Mild mediastinal reactive adenopathy is present as well.   Abdomen-Pelvis 1.  Cholelithiasis without evidence of cholecystitis. 2. Small amount of ascites with mild presacral edema and anasarca within the soft tissues of the abdominal wall. 3. Renal atrophy with extensive vascular calcifications.     MACRO: None   Signed by: Maria Garcia 1/21/2024 8:30 AM Dictation workstation:   [id number]    CT head wo IV contrast    Result Date: 1/21/2024  Interpreted By:  Maria Garcia, STUDY: CT HEAD WO IV CONTRAST 1/20/2024 11:42 pm   INDICATION: Signs/Symptoms:mental status changes   COMPARISON: 12/11/2023   ACCESSION NUMBER(S): [id number]   ORDERING CLINICIAN: BAN GIFFORD   TECHNIQUE: Unenhanced axial images of the brain are completed.   All CT examinations are performed with 1 or more of the following dose reduction techniques: Automated exposure control, adjustment of mA and/or kv according to patient's size, or use of iterative reconstruction techniques.   FINDINGS: Helical  unenhanced axial images of the brain demonstrate a mild to moderate degree of ventricular enlargement with proportionate widening of the sulci and sylvian fissures. There is no midline shift, mass effect, extra-axial fluid collection, or acute intracranial hemorrhage. There is diminished density seen in the periventricular white matter indicating chronic microvascular ischemic disease. There is calcified plaque seen within the distal vertebral and internal carotid arteries bilaterally. No calvarial abnormality is seen.       Atrophy and chronic microvascular ischemic disease without acute intracranial process.   Signed by: Maria Garcia 1/21/2024 8:09 AM Dictation workstation:   [id number]    XR chest 1 view    Result Date: 1/20/2024  Interpreted By:  Brendon Perez, STUDY: XR CHEST 1 VIEW; 1/20/2024 5:03 pm   INDICATION: CLINICAL INFORMATION: Signs/Symptoms:Insertion right IJ central line, also left thoracentesis.   COMPARISON: 01/19/2024 at 1338 hours   ACCESSION NUMBER(S): [id number]   ORDERING CLINICIAN: BOZENA ANTONIO   TECHNIQUE: Portable chest one view.   FINDINGS: The cardiac size is indeterminate in view of the AP projection. There is no change in the right-sided dual port central venous catheter. There is a new right internal jugular venous catheter present with the tip at approximately same level as the dual port central venous catheter, overlying the mid to lower right atrium. There is no evidence for pneumothorax. Patient has a history of left thoracentesis without evidence for pneumothorax on the left. There is bilateral basilar alveolar infiltrate and effusions. Left effusion is decreased compared to the study from 1 day earlier.   Surgical clips are identified within left chest wall. Endovascular stent is identified in the distribution of the left subclavian artery.       1. Status post right-sided central venous catheter placement as described above with the tip overlying the mid to caudal aspect of  the right atrium. There is no evidence for pneumothorax. 2. No evidence for left-sided pneumothorax after left-sided thoracentesis. Decrease in the left effusion. 3. Bilateral basilar infiltrates and effusions are present. Follow-up to assure complete clearing is suggested.   MACRO: none   Signed by: Brendon Perez 1/20/2024 5:11 PM Dictation workstation:   [id number]    XR chest 1 view    Result Date: 1/19/2024  Interpreted By:  Real Mendieta, STUDY: XR CHEST 1 VIEW; 1/19/2024 1:38 pm   INDICATION: Signs/Symptoms:dyspnea.   COMPARISON: 12/26/2023   ACCESSION NUMBER(S): [id number]   ORDERING CLINICIAN: EMELY GOLDSMITH   TECHNIQUE: 1 view of the chest was performed.   FINDINGS: There may be a minimal right pleural effusion. Slight prominence of the interstitium otherwise the right lung is clear. Improved appearance of the left lung with improved aeration of the right upper lobe. There is opacification of the left lower lobe possibly due to large pleural effusion which is similar to the prior study. No pneumothorax. Right-sided dual lumen central line catheter with tip at the proximal atrium. The cardiomediastinal silhouette is within normal limits. Left-sided subclavian stents are again noted.       Improved aeration and appearance of the left lung superiorly however there is a persistent large left pleural effusion and opacification of the left lower lobe.   Signed by: Real Mendieta 1/19/2024 1:44 PM Dictation workstation:   [id number]     Assessment/Plan     Septic shock-on pressors-remains on pressors  Left-sided pleural effusion   Left lower lobe pneumonia-treated  Proteus urinary tract infection-treated  History of MRSA endocarditis  History of C. 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

## 2024-02-08 NOTE — PROGRESS NOTES
"Ayanna Gross is a 73 y.o. female on day 20 of admission presenting with Septic shock (CMS/HCC).    Subjective   Patient seen and examined. In Contact Plus precautions. Daughter Meka present at bedside.     Objective     Physical Exam  Constitutional:       Appearance: She is ill-appearing.   HENT:      Head: Normocephalic and atraumatic.      Mouth/Throat:      Mouth: Mucous membranes are dry.   Cardiovascular:      Rate and Rhythm: Tachycardia present.   Pulmonary:      Effort: Pulmonary effort is normal. No respiratory distress.      Breath sounds: No wheezing, rhonchi or rales.   Abdominal:      General: Bowel sounds are normal. There is no distension.      Tenderness: There is no guarding or rebound.   Genitourinary:     Comments: deferred  Musculoskeletal:         General: No swelling.   Skin:     General: Skin is warm and dry.   Neurological:      Mental Status: She is oriented to person, place, and time.   Psychiatric:         Mood and Affect: Mood normal.         Last Recorded Vitals  Blood pressure (!) 69/44, pulse 101, temperature 36.4 °C (97.5 °F), temperature source Temporal, resp. rate 18, height 1.575 m (5' 2\"), weight 101 kg (223 lb 1.7 oz), SpO2 94 %.  Intake/Output last 3 Shifts:  I/O last 3 completed shifts:  In: 786.4 (8.5 mL/kg) [P.O.:360; I.V.:426.4 (4.6 mL/kg)]  Out: 480 (5.2 mL/kg) [Stool:480]  Dosing Weight: 93 kg     Relevant Results  Results for orders placed or performed during the hospital encounter of 01/19/24 (from the past 24 hour(s))   POCT GLUCOSE   Result Value Ref Range    POCT Glucose 216 (H) 74 - 99 mg/dL   Transthoracic Echo (TTE) Limited   Result Value Ref Range    MV E/A ratio 1.10     BSA 2.1 m2   POCT GLUCOSE   Result Value Ref Range    POCT Glucose 239 (H) 74 - 99 mg/dL     Scheduled medications  [Held by provider] apixaban, 2.5 mg, oral, BID  doxycylcine, 100 mg, oral, Daily  epoetin di or biosimilar, 10,000 Units, intravenous, Every Mon/Wed/Fri  escitalopram, 10 " mg, oral, Nightly  fludrocortisone, 0.1 mg, oral, TID  gabapentin, 100 mg, oral, TID  heparin, 2,000 Units, intra-catheter, After Dialysis  heparin, 2,000 Units, intra-catheter, After Dialysis  heparin, 2,000 Units, intra-catheter, After Dialysis  heparin, 2,000 Units, intra-catheter, After Dialysis  insulin lispro, 0-10 Units, subcutaneous, TID with meals  ipratropium-albuteroL, 3 mL, nebulization, q6h while awake  lidocaine, 5 mL, infiltration, Once  midodrine, 15 mg, oral, TID with meals  nystatin, , Topical, BID  nystatin, , Topical, BID  pantoprazole, 40 mg, oral, Daily   Or  pantoprazole, 40 mg, intravenous, Daily  perflutren lipid microspheres, 0.5-10 mL of dilution, intravenous, Once in imaging  perflutren protein A microsphere, 0.5 mL, intravenous, Once in imaging  psyllium, 1 packet, oral, BID  sodium chloride, 3 mL, nebulization, TID  sulfur hexafluoride microsphr, 2 mL, intravenous, Once in imaging  sulfur hexafluoride microsphr, 2 mL, intravenous, Once in imaging  vancomycin, 125 mg, oral, BID  zinc oxide, 1 Application, Topical, BID      Continuous medications  norepinephrine, 0.01-3 mcg/kg/min, Last Rate: 0.06 mcg/kg/min (02/08/24 1115)      PRN medications  PRN medications: acetaminophen, alteplase, ammonium lactate, benzocaine-menthoL, dextrose 10 % in water (D10W), dextrose, glucagon, ondansetron, oxygen, sennosides-docusate sodium     Assessment/Plan   Principal Problem:    Septic shock (CMS/[facility])  Active Problems:    Pneumonia    Low blood pressure    End stage renal disease (CMS/[facility])    Anemia due to blood loss, acute  Septic Shock  2. CHFpEF  3. ESRD on HD  4. Cardiogenic Shock  5. Palliative Care   Full Code   The patient is a capable decision maker  Makes decisions with her daughters  DPOA-HC is in Physical Chart - Daughter Meka     2/8/24  Spoke with patient and daughter Meka. We spoke about the fact that she has been originally denied for LTACH and there is a pending appeal. Farhanajonah  was not aware of this. Long discussion about goals of care.   There is an awareness of the patient's advanced state of illness.   Discussion about wanting quality time with family, the patient is now not suffering too much and just wants to have time with her family until she starts to suffer.   Discussion about CPR and the pain it could cause and the patient and daughter endorsed that the patient would like CPR even if it causes pain and suffering.   Conversation around the topic of if there were no approval for LTACH and the long term effects of vasopressors. We did discuss having a family discussion about the potential of having to make a decision to continue this or not and what adverse effects this would have on her health. Meka will discuss this with her sisters.   Due to the patient's illness and not being home for Baljinder. The family will get together Sunday and have a [place] gathering with the patient.   They would like to re-engage in having further goals of care discussions on Monday.       I spent 60 minutes in the professional and overall care of this patient. Total ACP time was 20 minutes.      Ashlyn Retana, APRN-CNP

## 2024-02-08 NOTE — CARE PLAN
Problem: Pain  Goal: My pain/discomfort is manageable  Outcome: Progressing     Problem: Safety  Goal: Patient will be injury free during hospitalization  Outcome: Progressing  Goal: I will remain free of falls  Outcome: Progressing     Problem: Daily Care  Goal: Daily care needs are met  Outcome: Progressing     Problem: Psychosocial Needs  Goal: Demonstrates ability to cope with hospitalization/illness  Outcome: Progressing  Goal: Collaborate with me, my family, and caregiver to identify my specific goals  Outcome: Progressing     Problem: Discharge Barriers  Goal: My discharge needs are met  Outcome: Progressing     Problem: Fall/Injury  Goal: Not fall by end of shift  Outcome: Progressing  Goal: Be free from injury by end of the shift  Outcome: Progressing  Goal: Verbalize understanding of personal risk factors for fall in the hospital  Outcome: Progressing  Goal: Verbalize understanding of risk factor reduction measures to prevent injury from fall in the home  Outcome: Progressing  Goal: Pace activities to prevent fatigue by end of the shift  Outcome: Progressing     Problem: Skin  Goal: Decreased wound size/increased tissue granulation at next dressing change  Outcome: Progressing  Flowsheets (Taken 2/8/2024 0442 by Stephanie Navarro, RN)  Decreased wound size/increased tissue granulation at next dressing change:   Utilize specialty bed per algorithm   Promote sleep for wound healing  Goal: Participates in plan/prevention/treatment measures  Outcome: Progressing  Flowsheets (Taken 2/8/2024 0442 by Stephanie Navarro, RN)  Participates in plan/prevention/treatment measures: Elevate heels  Goal: Prevent/manage excess moisture  Outcome: Progressing  Flowsheets (Taken 2/8/2024 0442 by Stephanie Navarro, RN)  Prevent/manage excess moisture:   Moisturize dry skin   Cleanse incontinence/protect with barrier cream   Monitor for/manage infection if present  Goal: Prevent/minimize sheer/friction injuries  Outcome:  Progressing  Flowsheets (Taken 2/8/2024 0442 by Stephanie Navarro, RN)  Prevent/minimize sheer/friction injuries:   HOB 30 degrees or less   Turn/reposition every 2 hours/use positioning/transfer devices   Use pull sheet  Goal: Promote/optimize nutrition  Outcome: Progressing  Flowsheets (Taken 2/8/2024 0442 by Stephanie Navarro, RN)  Promote/optimize nutrition: Monitor/record intake including meals  Goal: Promote skin healing  Outcome: Progressing  Flowsheets (Taken 2/8/2024 0442 by Stephanie Navarro, RN)  Promote skin healing:   Turn/reposition every 2 hours/use positioning/transfer devices   Rotate device position/do not position patient on device   Protective dressings over bony prominences     Problem: Pain  Goal: Takes deep breaths with improved pain control throughout the shift  Outcome: Progressing  Goal: Turns in bed with improved pain control throughout the shift  Outcome: Progressing  Goal: Walks with improved pain control throughout the shift  Outcome: Progressing  Goal: Performs ADL's with improved pain control throughout shift  Outcome: Progressing  Goal: Participates in PT with improved pain control throughout the shift  Outcome: Progressing     Problem: Respiratory  Goal: Clear secretions with interventions this shift  Outcome: Progressing  Goal: Minimize anxiety/maximize coping throughout shift  Outcome: Progressing  Goal: Minimal/no exertional discomfort or dyspnea this shift  Outcome: Progressing  Goal: Patent airway maintained this shift  Outcome: Progressing  Goal: Tolerate pulmonary toileting this shift  Outcome: Progressing  Goal: Verbalize decreased shortness of breath this shift  Outcome: Progressing  Goal: Increase self care and/or family involvement in next 24 hours  Outcome: Progressing  Goal: No signs of respiratory distress (eg. Use of accessory muscles. Peds grunting)  Outcome: Progressing  Goal: Tolerate mechanical ventilation evidenced by VS/agitation level this shift  Outcome: Progressing    "  Problem: Diabetes  Goal: Maintain electrolyte levels within acceptable range throughout shift  Outcome: Progressing  Goal: Maintain glucose levels >70mg/dl to <250mg/dl throughout shift  Outcome: Progressing  Goal: No changes in neurological exam by end of shift  Outcome: Progressing  Goal: Learn about and adhere to nutrition recommendations by end of shift  Outcome: Progressing  Goal: Vital signs within normal range for age by end of shift  Outcome: Progressing  Goal: Increase self care and/or family involovement by end of shift  Outcome: Progressing  Goal: Receive DSME education by end of shift  Outcome: Progressing     Problem: Discharge Planning  Goal: Discharge to home or other facility with appropriate resources  Outcome: Progressing             The patient's goals for the shift include \"transfer out of the hospital to [place]\"    The clinical goals for the shift include wean off pressors      "

## 2024-02-08 NOTE — PROGRESS NOTES
"Nutrition Follow up Note    Nutrition Assessment      Denited LTAC after peer to peer. Patient with end stage disease state with multiple organ failure. Awaiting patient/family decision on goals of care.    Nutrition History:  Food and Nutrient History: Daughter bedside states po intake greatly improved  Energy Intake: Fair 50-75 %  Food Allergies/Intolerances:  None  GI Symptoms: None  Oral Problems: None    Anthropometrics:  Ht: 157.5 cm (5' 2\"), Wt: 101 kg (223 lb 1.7 oz), BMI: 40.8  IBW/kg (Dietitian Calculated): 50 kg  Percent of IBW: 151.6 %  Adjusted Body Weight (kg): 56.36 kg    Weight Change:  Daily Weight  02/07/24 : 101 kg (223 lb 1.7 oz)  01/05/24 : 72.8 kg (160 lb 7.9 oz)  11/19/23 : 72.6 kg (160 lb)  01/19/23 : 80.2 kg (176 lb 12.9 oz)  11/07/22 : 86.2 kg (190 lb)  07/21/22 : 80.2 kg (176 lb 12.9 oz)  05/26/22 : 77.1 kg (170 lb)  04/04/22 : 86.2 kg (190 lb)  06/29/21 : 87.9 kg (193 lb 12.6 oz)  05/11/21 : 81.6 kg (180 lb)     Nutrition Focused Physical Exam Findings:      Edema  Edema: +2 mild  Edema Location: BLE, BUE    Nutrition Significant Labs:  Lab Results   Component Value Date    WBC 11.1 02/06/2024    HGB 8.8 (L) 02/06/2024    HCT 26.4 (L) 02/06/2024     02/06/2024    CHOL 104 (L) 07/01/2023    TRIG 155 (H) 07/01/2023    HDL 30 (L) 07/01/2023    ALT 20 02/06/2024    AST 66 (H) 02/06/2024     02/06/2024    K 4.1 02/06/2024    CL 99 02/06/2024    CREATININE 2.40 (H) 02/06/2024    BUN 13 02/06/2024    CO2 21 (L) 02/06/2024    TSH 6.10 (H) 01/24/2024    INR 1.3 (H) 01/19/2024    HGBA1C 8.8 (H) 06/25/2023    ALBUR 1,315 (H) 02/24/2019       Current Facility-Administered Medications:     acetaminophen (Tylenol) tablet 650 mg, 650 mg, oral, q4h PRN, Ronald Casey, APRN-CNP    alteplase (Cathflo Activase) injection 2 mg, 2 mg, intra-catheter, PRN, Ronald Casey, APRN-CNP    ammonium lactate (Lac-Hydrin) 12 % lotion 1 Application, 1 Application, Topical, q1h PRN, Minh Shane, " MD, 1 Application at 02/07/24 2230    [Held by provider] apixaban (Eliquis) tablet 2.5 mg, 2.5 mg, oral, BID, Judd Tavera DO, 2.5 mg at 02/06/24 0824    benzocaine-menthoL (Dermoplast) topical spray, , Topical, 4x daily PRN, MADHURI Diaz-CNP    dextrose 10 % in water (D10W) infusion, 0.3 g/kg/hr, intravenous, Once PRN, Judd Tavera DO    dextrose 50 % injection 25 g, 25 g, intravenous, q15 min PRN, Judd Tavera DO    doxycycline (Vibramycin) capsule 100 mg, 100 mg, oral, Daily, Judd Barreto MD, 100 mg at 02/08/24 1401    epoetin di-epbx (Retacrit) injection 10,000 Units, 10,000 Units, intravenous, Every Mon/Wed/Fri, Saeed Mondragon MD, 10,000 Units at 02/07/24 1210    escitalopram (Lexapro) tablet 10 mg, 10 mg, oral, Nightly, Judd Tavera DO, 10 mg at 02/07/24 2009    fludrocortisone (Florinef) tablet 0.1 mg, 0.1 mg, oral, TID, Yusuf De La Garza PA-C, 0.1 mg at 02/08/24 1401    gabapentin (Neurontin) capsule 100 mg, 100 mg, oral, TID, MADHURI Diaz-CNP, 100 mg at 02/08/24 0826    glucagon (Glucagen) injection 1 mg, 1 mg, intramuscular, q15 min PRN, Judd Tavera DO    heparin 1,000 unit/mL injection 2,000 Units, 2,000 Units, intra-catheter, After Dialysis, Saeed Mondragon MD, 1,600 Units at 02/07/24 2100    heparin 1,000 unit/mL injection 2,000 Units, 2,000 Units, intra-catheter, After Dialysis, Saeed Mondragon MD, 1,600 Units at 02/07/24 2120    heparin 1,000 unit/mL injection 2,000 Units, 2,000 Units, intra-catheter, After Dialysis, Saeed Mondragon MD, 1,600 Units at 02/05/24 1845    heparin 1,000 unit/mL injection 2,000 Units, 2,000 Units, intra-catheter, After Dialysis, Saeed Mondragon MD, 1,600 Units at 02/05/24 1844    insulin lispro (HumaLOG) injection 0-10 Units, 0-10 Units, subcutaneous, TID with meals, Judd aTvera DO, 4 Units at 02/08/24 0825    ipratropium-albuteroL (Duo-Neb) 0.5-2.5 mg/3 mL nebulizer solution  3 mL, 3 mL, nebulization, q6h while awake, Judd Barreto MD, 3 mL at 02/08/24 1142    lidocaine (Xylocaine) 10 mg/mL (1 %) injection 50 mg, 5 mL, infiltration, Once, LILLIE Mccray    midodrine (Proamatine) tablet 15 mg, 15 mg, oral, TID with meals, Judd Tavera DO, 15 mg at 02/08/24 1401    norepinephrine (Levophed) 8 mg in dextrose 5% 250 mL (0.032 mg/mL) infusion (premix), 0.01-3 mcg/kg/min, intravenous, Continuous, Yusuf De La Garza PA-C, Last Rate: 6.97 mL/hr at 02/08/24 1340, 0.05 mcg/kg/min at 02/08/24 1340    nystatin (Mycostatin) 100,000 unit/gram powder, , Topical, BID, Judd Tavera DO, Given at 02/08/24 0900    nystatin (Mycostatin) cream, , Topical, BID, LILLIE Blair, Given at 02/08/24 0900    ondansetron (Zofran) injection 4 mg, 4 mg, intravenous, q6h PRN, LILLIE Mccray, 4 mg at 02/03/24 0521    oxygen (O2) therapy, , inhalation, Continuous PRN - O2/gases, Judd Tavera DO, 2 L/min at 02/05/24 0800    pantoprazole (ProtoNix) EC tablet 40 mg, 40 mg, oral, Daily, 40 mg at 02/08/24 0826 **OR** pantoprazole (ProtoNix) injection 40 mg, 40 mg, intravenous, Daily, Yusuf De La Garza PA-C, 40 mg at 02/07/24 0859    perflutren lipid microspheres (Definity) injection 0.5-10 mL of dilution, 0.5-10 mL of dilution, intravenous, Once in imaging, Minh Shane MD    perflutren protein A microsphere (Optison) injection 0.5 mL, 0.5 mL, intravenous, Once in imaging, Minh Shane MD    psyllium (Metamucil) 3.4 gram packet 1 packet, 1 packet, oral, BID, Ronald Casey APRN-CNP, 1 packet at 02/08/24 0825    sennosides-docusate sodium (Judy-Colace) 8.6-50 mg per tablet 1 tablet, 1 tablet, oral, Nightly PRN, Yusuf De La Garza PA-C    sodium chloride 3 % nebulizer solution 3 mL, 3 mL, nebulization, TID, Charly Grossman MD, 3 mL at 02/08/24 1142    sulfur hexafluoride microsphr (Lumason) injection 24.28 mg, 2 mL, intravenous, Once in imaging, Minh Peoples  MD [place]    sulfur hexafluoride microsphr (Lumason) injection 24.28 mg, 2 mL, intravenous, Once in imaging, Ángel Hardy, SKIPCNP    vancomycin (Vancocin) capsule 125 mg, 125 mg, oral, BID, MADHURI Youngblood-CNP, 125 mg at 02/08/24 0826    zinc oxide 20 % ointment 1 Application, 1 Application, Topical, BID, Nickolas Gallego, MADHURI-CNP, 1 Application at 02/08/24 0900    Dietary Orders (From admission, onward)       Start     Ordered    02/07/24 1054  Adult diet Regular  Diet effective now        Question:  Diet type  Answer:  Regular    02/07/24 1053                   Estimated Needs:   Estimated Energy Needs  Total Energy Estimated Needs (kCal):  (9968-5970)  Total Estimated Energy Need per Day (kCal/kg):  (30-35)  Method for Estimating Needs: ABW    Estimated Protein Needs  Total Protein Estimated Needs (g):  (68-85)  Total Protein Estimated Needs (g/kg):  (1.2-1.5)  Method for Estimating Needs: ABW    Estimated Fluid Needs  Total Fluid Estimated Needs (mL):  (UO + 500 mL)  Method for Estimating Needs: HD and multiple wounds      Nutrition Diagnosis   Nutrition Diagnosis:  Malnutrition Diagnosis  Diagnosis Status: Ongoing    Nutrition Diagnosis  Patient has Nutrition Diagnosis: Yes  Diagnosis Status (1): Ongoing  Nutrition Diagnosis 1: Increased nutrient needs  Related to (1): Increased demand for nutrient  As Evidenced by (1): HD     Nutrition Interventions/Recommendations   Nutrition Interventions and Recommendations:    Nutrition Prescription:  Individualized Nutrition Prescription Provided for : 1692-1974 kcals, 68-85 gm protein via Renal diet    Nutrition Interventions:   Food and/or Nutrient Delivery Interventions  Interventions: Meals and snacks  Meals and Snacks: General healthful diet  Goal: Provide as ordered    Education Documentation  No documentation found.         Nutrition Monitoring and Evaluation   Monitoring/Evaluation:   Food/Nutrient Related History Monitoring  Monitoring and  Evaluation Plan: Energy intake  Energy Intake: Estimated energy intake  Criteria: Pt to consume >/= 75% of estimated needs       Time Spent/Follow-up:   Follow Up  Time Spent (min): 20 minutes  Last Date of Nutrition Visit: 02/08/24  Nutrition Follow-Up Needed?: 5-7 days  Follow up Comment: 2/13/24

## 2024-02-09 NOTE — PROGRESS NOTES
"Ayanna Gross is a 73 y.o. female on day 21 of admission presenting with Septic shock (CMS/HCC).    Subjective   Patient to have dialysis today. Levo is currently being titrated up for this as pressures have been low overnight.     Objective     Physical Exam  Constitutional:       Appearance: She is ill-appearing.   HENT:      Head: Normocephalic and atraumatic.      Mouth/Throat:      Mouth: Mucous membranes are dry.   Cardiovascular:      Rate and Rhythm: Tachycardia present.   Pulmonary:      Effort: Pulmonary effort is normal. No respiratory distress.      Breath sounds: No wheezing, rhonchi or rales.   Abdominal:      General: Bowel sounds are normal. There is no distension.      Tenderness: There is no guarding or rebound.   Genitourinary:     Comments: deferred  Musculoskeletal:         General: No swelling.   Skin:     General: Skin is warm and dry.   Neurological:      Mental Status: She is oriented to person, place, and time.   Psychiatric:         Mood and Affect: Mood normal.     Last Recorded Vitals  Blood pressure (!) 70/33, pulse 92, temperature 36.5 °C (97.7 °F), temperature source Temporal, resp. rate 17, height 1.575 m (5' 2\"), weight 99.5 kg (219 lb 5.7 oz), SpO2 95 %.  Intake/Output last 3 Shifts:  I/O last 3 completed shifts:  In: 377.4 (4.1 mL/kg) [P.O.:120; I.V.:257.4 (2.8 mL/kg)]  Out: 380 (4.1 mL/kg) [Stool:380]  Dosing Weight: 93 kg     Relevant Results  Results for orders placed or performed during the hospital encounter of 01/19/24 (from the past 24 hour(s))   POCT GLUCOSE   Result Value Ref Range    POCT Glucose 246 (H) 74 - 99 mg/dL   BLOOD GAS LACTIC ACID, VENOUS   Result Value Ref Range    POCT Lactate, Venous 2.1 (H) 0.4 - 2.0 mmol/L   Basic metabolic panel   Result Value Ref Range    Glucose 178 (H) 65 - 99 mg/dL    Sodium 130 (L) 133 - 145 mmol/L    Potassium 3.5 3.4 - 5.1 mmol/L    Chloride 98 97 - 107 mmol/L    Bicarbonate 22 (L) 24 - 31 mmol/L    Urea Nitrogen 17 8 - 25 mg/dL "    Creatinine 2.70 (H) 0.40 - 1.60 mg/dL    eGFR 18 (L) >60 mL/min/1.73m*2    Calcium 8.0 (L) 8.5 - 10.4 mg/dL    Anion Gap 10 <=19 mmol/L   Blood Gas Lactic Acid, Venous   Result Value Ref Range    POCT Lactate, Venous 2.0 0.4 - 2.0 mmol/L   POCT GLUCOSE   Result Value Ref Range    POCT Glucose 156 (H) 74 - 99 mg/dL   CBC   Result Value Ref Range    WBC 14.5 (H) 4.4 - 11.3 x10*3/uL    nRBC 0.0 0.0 - 0.0 /100 WBCs    RBC 2.79 (L) 4.00 - 5.20 x10*6/uL    Hemoglobin 8.6 (L) 12.0 - 16.0 g/dL    Hematocrit 27.6 (L) 36.0 - 46.0 %    MCV 99 80 - 100 fL    MCH 30.8 26.0 - 34.0 pg    MCHC 31.2 (L) 32.0 - 36.0 g/dL    RDW 22.7 (H) 11.5 - 14.5 %    Platelets 239 150 - 450 x10*3/uL   POCT GLUCOSE   Result Value Ref Range    POCT Glucose 172 (H) 74 - 99 mg/dL   POCT GLUCOSE   Result Value Ref Range    POCT Glucose 174 (H) 74 - 99 mg/dL     Scheduled medications  albumin human, 25 g, intravenous, q1h  [Held by provider] apixaban, 2.5 mg, oral, BID  doxycylcine, 100 mg, oral, Daily  epoetin di or biosimilar, 10,000 Units, intravenous, Every Mon/Wed/Fri  escitalopram, 10 mg, oral, Nightly  fludrocortisone, 0.1 mg, oral, TID  gabapentin, 100 mg, oral, TID  heparin, 2,000 Units, intra-catheter, After Dialysis  heparin, 2,000 Units, intra-catheter, After Dialysis  heparin, 2,000 Units, intra-catheter, After Dialysis  heparin, 2,000 Units, intra-catheter, After Dialysis  insulin lispro, 0-10 Units, subcutaneous, TID with meals  ipratropium-albuteroL, 3 mL, nebulization, q6h while awake  lidocaine, 5 mL, infiltration, Once  midodrine, 15 mg, oral, TID with meals  nystatin, , Topical, BID  nystatin, , Topical, BID  pantoprazole, 40 mg, oral, Daily   Or  pantoprazole, 40 mg, intravenous, Daily  perflutren lipid microspheres, 0.5-10 mL of dilution, intravenous, Once in imaging  perflutren protein A microsphere, 0.5 mL, intravenous, Once in imaging  psyllium, 1 packet, oral, BID  sodium chloride, 3 mL, nebulization, TID  sulfur  hexafluoride microsphr, 2 mL, intravenous, Once in imaging  sulfur hexafluoride microsphr, 2 mL, intravenous, Once in imaging  vancomycin, 125 mg, oral, BID  zinc oxide, 1 Application, Topical, BID      Continuous medications  norepinephrine, 0.01-3 mcg/kg/min, Last Rate: 0.05 mcg/kg/min (02/09/24 0934)      PRN medications  PRN medications: acetaminophen, alteplase, ammonium lactate, benzocaine-menthoL, dextrose 10 % in water (D10W), dextrose, glucagon, ondansetron, oxygen, sennosides-docusate sodium     Assessment/Plan   Principal Problem:    Septic shock (CMS/[facility])  Active Problems:    Pneumonia    Low blood pressure    End stage renal disease (CMS/[facility])    Anemia due to blood loss, acute    Septic Shock  2. CHFpEF  3. ESRD on HD  4. Cardiogenic Shock  5. Palliative Care   Full Code   The patient is a capable decision maker  Makes decisions with her daughters  DPOA-HC is in Physical Chart - Daughter Meka      2/9/24  Patient remains without a discharge plan. She does not currently qualify for LTACH. The plan is to titrated Levo to mentation as her baseline blood pressure is low coupled with poor vasculature we may not be getting a true picture of her real numbers. For dialysis today Levo was titrated back upwards. From a palliative care perspective we will follow intermittently and re-engage with goals of care discussions on Monday. As of now there are not many options for discharge as the patient and family does not have interest in a palliative/comfort approach, although given our latest conversation a palliative/comfort approach is more in line of what their stated wishes are. [place] law does not not allow hospice to discriminate on enrollment based on code status and the family would like to have a place to gather while the patient has time left. This will have to be reevaluated once we realize of weaning off of pressors is or is not a viable achievement.    2/8/24  Spoke with patient and daughter Meka. We  spoke about the fact that she has been originally denied for LTACH and there is a pending appeal. Meka was not aware of this. Long discussion about goals of care.   There is an awareness of the patient's advanced state of illness.   Discussion about wanting quality time with family, the patient is now not suffering too much and just wants to have time with her family until she starts to suffer.   Discussion about CPR and the pain it could cause and the patient and daughter endorsed that the patient would like CPR even if it causes pain and suffering.   Conversation around the topic of if there were no approval for LTACH and the long term effects of vasopressors. We did discuss having a family discussion about the potential of having to make a decision to continue this or not and what adverse effects this would have on her health. Meka will discuss this with her sisters.   Due to the patient's illness and not being home for Baljinder. The family will get together Sunday and have a [place] gathering with the patient.   They would like to re-engage in having further goals of care discussions on Monday.   I spent minutes in the professional and overall care of this patient.      Ashlyn Retana, APRN-CNP

## 2024-02-09 NOTE — NURSING NOTE
Pt has a R chest dialysis catheter, drsg dry and intact (dated 2/6) without any redness, swelling or drainage. Pt also has a dual lumen picc line to R upper arm, drsg very loose, will change drsg today. Drsg has been changed using sterile technique, site without any redness, swelling or drainage. Both blue caps have been changed, brisk blood return noted to both lumens and they both flush easily with NS, IV infusion reconnected to purple lumen, curos cap applied to red lumen.

## 2024-02-09 NOTE — NURSING NOTE
Pt BP significantly low most of the night , titrating levo per mental status, per MD order. Pt has remain A+OX4 all night

## 2024-02-09 NOTE — PROGRESS NOTES
Ayanna Gross is a 73 y.o. female on day 21 of admission presenting with Septic shock (CMS/Formerly [facility]).    Critical Care Medicine Progress Note    Admitted on:     1/19/2024  Length of Stay: 21 day(s)     Interval History     Still on levo 0.05 --> 0.08 for HD today. Weaning to 0.04  No other complaints      Objective   Objective     Vitals:    02/09/24 0300   Weight: 99.5 kg (219 lb 5.7 oz)   Body mass index is 40.12 kg/m².        2/9/2024     2:30 PM 2/9/2024     3:00 PM 2/9/2024     3:30 PM 2/9/2024     4:00 PM 2/9/2024     4:30 PM 2/9/2024     5:00 PM 2/9/2024     5:30 PM   Vitals   Systolic 75 70 65 57 83 86 69   Diastolic 51 33 27 32 38 49 53   Heart Rate 94 92 90 91 94 91 96   Resp 16 17 14 17 17 15 19        Vent settings:       Intake/Output Summary (Last 24 hours) at 2/9/2024 1741  Last data filed at 2/9/2024 1726  Gross per 24 hour   Intake 499.4 ml   Output 1300 ml   Net -800.6 ml     Physical exam:  -----------------  Awake and oriented to place and person  Cardiovascular:      Rate and Rhythm: Normal rate. Rhythm irregular.   Pulmonary:      Effort: Pulmonary effort is normal.   Abdominal:      General: Abdomen is flat.   Musculoskeletal:         General: Deformity present. Normal range of motion.      Cervical back: Normal range of motion.      Comments: Abscence of left hand first finger   Skin:     General: Skin is warm.      Capillary Refill: Capillary refill takes less than 2 seconds.      Comments: Skin tear healing to L hand  Wound to bilateral feet and sacrum     Medications     Scheduled Medications:   [Held by provider] apixaban, 2.5 mg, oral, BID  doxycylcine, 100 mg, oral, Daily  epoetin di or biosimilar, 10,000 Units, intravenous, Every Mon/Wed/Fri  escitalopram, 10 mg, oral, Nightly  fludrocortisone, 0.1 mg, oral, TID  gabapentin, 100 mg, oral, TID  heparin, 2,000 Units, intra-catheter, After Dialysis  heparin, 2,000 Units, intra-catheter, After Dialysis  heparin, 2,000 Units, intra-catheter,  After Dialysis  heparin, 2,000 Units, intra-catheter, After Dialysis  insulin lispro, 0-10 Units, subcutaneous, TID with meals  ipratropium-albuteroL, 3 mL, nebulization, q6h while awake  lidocaine, 5 mL, infiltration, Once  midodrine, 15 mg, oral, TID with meals  nystatin, , Topical, BID  nystatin, , Topical, BID  pantoprazole, 40 mg, oral, Daily   Or  pantoprazole, 40 mg, intravenous, Daily  perflutren lipid microspheres, 0.5-10 mL of dilution, intravenous, Once in imaging  perflutren protein A microsphere, 0.5 mL, intravenous, Once in imaging  psyllium, 1 packet, oral, BID  sodium chloride, 3 mL, nebulization, TID  sulfur hexafluoride microsphr, 2 mL, intravenous, Once in imaging  sulfur hexafluoride microsphr, 2 mL, intravenous, Once in imaging  vancomycin, 125 mg, oral, BID  zinc oxide, 1 Application, Topical, BID       Continuous Medications:   norepinephrine, 0.01-3 mcg/kg/min, Last Rate: 0.06 mcg/kg/min (02/09/24 1735)       PRN Medications:     Labs     Results from last 72 hours   Lab Units 02/08/24  1858   GLUCOSE mg/dL 178*   SODIUM mmol/L 130*   POTASSIUM mmol/L 3.5   CHLORIDE mmol/L 98   CO2 mmol/L 22*   BUN mg/dL 17   CREATININE mg/dL 2.70*   EGFR mL/min/1.73m*2 18*   CALCIUM mg/dL 8.0*                   Results from last 72 hours   Lab Units 02/09/24  0537   WBC AUTO x10*3/uL 14.5*   NRBC AUTO /100 WBCs 0.0   RBC AUTO x10*6/uL 2.79*   HEMOGLOBIN g/dL 8.6*   HEMATOCRIT % 27.6*   MCV fL 99   MCH pg 30.8   MCHC g/dL 31.2*   RDW % 22.7*   PLATELETS AUTO x10*3/uL 239         Lab Results   Component Value Date    BLOODCULT No growth at 4 days -  FINAL REPORT 01/19/2024    BLOODCULT No growth at 4 days -  FINAL REPORT 01/19/2024    GRAMSTAIN (A) 12/15/2023     Gram stain indicates specimen contains significant salivary contamination.     Lab Results   Component Value Date    URINECULTURE >100,000 Proteus mirabilis (A) 01/19/2024       Imaging and Diagnostic Studies     Lab/Radiology/Diagnostic  Review:  Results for orders placed or performed during the hospital encounter of 01/19/24 (from the past 24 hour(s))   BLOOD GAS LACTIC ACID, VENOUS   Result Value Ref Range    POCT Lactate, Venous 2.1 (H) 0.4 - 2.0 mmol/L   Basic metabolic panel   Result Value Ref Range    Glucose 178 (H) 65 - 99 mg/dL    Sodium 130 (L) 133 - 145 mmol/L    Potassium 3.5 3.4 - 5.1 mmol/L    Chloride 98 97 - 107 mmol/L    Bicarbonate 22 (L) 24 - 31 mmol/L    Urea Nitrogen 17 8 - 25 mg/dL    Creatinine 2.70 (H) 0.40 - 1.60 mg/dL    eGFR 18 (L) >60 mL/min/1.73m*2    Calcium 8.0 (L) 8.5 - 10.4 mg/dL    Anion Gap 10 <=19 mmol/L   Blood Gas Lactic Acid, Venous   Result Value Ref Range    POCT Lactate, Venous 2.0 0.4 - 2.0 mmol/L   POCT GLUCOSE   Result Value Ref Range    POCT Glucose 156 (H) 74 - 99 mg/dL   CBC   Result Value Ref Range    WBC 14.5 (H) 4.4 - 11.3 x10*3/uL    nRBC 0.0 0.0 - 0.0 /100 WBCs    RBC 2.79 (L) 4.00 - 5.20 x10*6/uL    Hemoglobin 8.6 (L) 12.0 - 16.0 g/dL    Hematocrit 27.6 (L) 36.0 - 46.0 %    MCV 99 80 - 100 fL    MCH 30.8 26.0 - 34.0 pg    MCHC 31.2 (L) 32.0 - 36.0 g/dL    RDW 22.7 (H) 11.5 - 14.5 %    Platelets 239 150 - 450 x10*3/uL   POCT GLUCOSE   Result Value Ref Range    POCT Glucose 172 (H) 74 - 99 mg/dL   POCT GLUCOSE   Result Value Ref Range    POCT Glucose 174 (H) 74 - 99 mg/dL   POCT GLUCOSE   Result Value Ref Range    POCT Glucose 156 (H) 74 - 99 mg/dL     Upper extremity venous duplex bilateral    Result Date: 1/25/2024           [place] [address]            Phone [phone]  Vascular Lab Report  [facility] US UPPER EXTREMITY VENOUS DUPLEX BILATERAL Patient Name:      BASIA Gray Physician:  [id number] Mini Busby MD, RPVI Study Date:        1/25/2024           Ordering Provider:  [id number] ERI BERMAN MRN/PID:           [id number]            Fellow: Accession#:        [id number]         Technologist:       Dara Parr RVT Date of Birth/Age: 1950 / 73 years Technologist 2: Gender:            F                   Encounter#:         [id number] Admission Status:  Inpatient           Location Performed: [facility]  Diagnosis/ICD: Right arm swelling-M79.89; Left arm swelling-M79.89 CPT Codes:     79986 Peripheral venous duplex scan for DVT complete  CONCLUSIONS:  Right Upper Venous: No evidence of acute deep vein thrombus visualized in the right upper extremity. Internal jugular vein was visualized in segments due to IV lines and bandages. Left Upper Venous: No evidence of acute deep vein thrombus visualized in the left upper extremity. Subclavian stent is noted and appears patent. There is a known occluded dialysis access noted.  Additional Findings: Technically difficult exam due to IV lines, bandages, and patient's positioning.  Imaging & Doppler Findings:  Right               Compressible Thrombus        Flow Internal Jugular        Yes        None   Spontaneous/Phasic Subclavian              Yes        None Subclavian Proximal     Yes        None   Spontaneous/Phasic Subclavian Mid          Yes        None Subclavian Distal       Yes        None   Spontaneous/Phasic Axillary                Yes        None       Pulsatile Brachial                Yes        None Cephalic                Yes        None Basilic                 Yes        None  Left                Compress Thrombus        Flow Internal Jugular      Yes      None       Pulsatile Subclavian            Yes      None Subclavian Proximal   Yes      None       Pulsatile Subclavian Mid        Yes      None       Pulsatile Subclavian Distal     Yes      None       Pulsatile Axillary              Yes      None   Spontaneous/Phasic Brachial              Yes      None Cephalic              Yes      None Basilic               Yes      None  [id number] Mini Busby MD, RPMOOKIE Electronically signed by [id number] ALICE Kelly MD on  1/25/2024 at 4:06:13 PM  ** Final **     ECG 12 lead    Result Date: 1/24/2024  Sinus tachycardia  with 1st degree AV block Right bundle branch block Possible Lateral infarct , age undetermined Abnormal ECG Confirmed by Yesika Nj ([id number]) on 1/24/2024 6:54:45 AM    XR tibia fibula right 2 views    Result Date: 1/22/2024  Interpreted By:  Real Mendieta, STUDY: XR TIBIA FIBULA RIGHT 2 VIEWS; 1/22/2024 2:15 pm   INDICATION: Signs/Symptoms:evaluate source of infection, osteomyelitis;   COMPARISON: None available.   ACCESSION NUMBER(S): [id number]   ORDERING CLINICIAN: BAN GIFFORD   TECHNIQUE: Views: AP and Lateral of the right tibia and fibula   FINDINGS: RESULT: There is no evidence for fracture or dislocation. Tricompartmental degenerative changes of the right knee. The tibia and fibula appear intact without bony erosion or evidence for osteomyelitis. No other bony or soft tissue abnormality is identified. No subcutaneous gas is noted.       No evidence for acute osseous abnormality. No bony erosion to suggest osteomyelitis.   Signed by: Real Mendieta 1/22/2024 3:15 PM Dictation workstation:   [id number]    XR ankle right 3+ views    Result Date: 1/22/2024  Interpreted By:  Real Mendieta, STUDY: XR ANKLE RIGHT 3+ VIEWS; 1/22/2024 2:15 pm   INDICATION: Signs/Symptoms:Evuluate source of infection, osteomyelitis;   COMPARISON: None available.   ACCESSION NUMBER(S): [id number]   ORDERING CLINICIAN: BAN GIFFORD   TECHNIQUE: Views: AP, Lateral, Oblique, right ankle   FINDINGS: RESULT: There is no evidence for fracture or dislocation. The ankle mortise is intact. Joint spaces appear adequately maintained. No bony erosion to suggest osteomyelitis. No bone lesion or soft tissue abnormality is identified. No subcutaneous gas is seen.       No evidence for acute osseous abnormality. No bony erosion to suggest osteomyelitis.   Signed by: Real Mendieta 1/22/2024 3:14 PM Dictation workstation:    [id number]    Transthoracic Echo (TTE) Complete    Result Date: 1/22/2024           [facility] [id number] [place], [place] [id number]            Phone [phone] TRANSTHORACIC ECHOCARDIOGRAM REPORT  Patient Name:      BASIA GUAN VEGA      Isaac Physician:   [id number] Fausto Rowe DO Study Date:        1/22/2024           Ordering Provider:   [id number] ERI BERMAN MRN/PID:           [id number]            Fellow: Accession#:        [id number]        Nurse: Date of Birth/Age: 1950 / 73 years Sonographer:         Tabatha Page RDCS Gender:            F                   Additional Staff: Height:            157.00 cm           Admit Date: Weight:            75.00 kg            Admission Status:    Inpatient - Routine BSA:               1.76 m2             Department Location: [facility] Blood Pressure: 88 /49 mmHg Study Type:    TRANSTHORACIC ECHO (TTE) COMPLETE Diagnosis/ICD: Chronic combined systolic (congestive) and diastolic (congestive)                heart failure (CHF)-I50.42; Sepsis, unspecified organism-A41.9 Indication:    heart failure,septic shock CPT Codes:     Echo Complete w Full Doppler-07949 Patient History: BMI:               Obese >30 Pertinent History: Sepsis, PVD, A-Fib, CVA, Cancer and HTN. breast                    prosthesis/ca, ESRD,anemia,septic shock,heart failure. Study Detail: The following Echo studies were performed: 2D, M-Mode, Doppler and               color flow. Technically challenging study due to prosthesis,               prominent lung artifact and body habitus.  PHYSICIAN INTERPRETATION: Left Ventricle: Left ventricular systolic function is normal, with an estimated ejection fraction of 70%. There are no regional wall motion abnormalities. The left ventricular cavity size is normal. Left ventricular diastolic filling was indeterminate. Left Atrium: The left atrium is moderately dilated. Right  Ventricle: The right ventricle is normal in size. There is normal right ventricular global systolic function. Right Atrium: The right atrium is normal in size. Aortic Valve: The aortic valve is trileaflet. There is no evidence of aortic valve regurgitation. The peak instantaneous gradient of the aortic valve is 2.8 mmHg. Mitral Valve: The mitral valve is normal in structure. There is no evidence of mitral valve regurgitation. Tricuspid Valve: The tricuspid valve is structurally normal. There is trace tricuspid regurgitation. Pulmonic Valve: The pulmonic valve is not well visualized. The pulmonic valve regurgitation was not well visualized. Pericardium: There is no pericardial effusion noted. Aorta: The aortic root is normal.  CONCLUSIONS:  1. Left ventricular systolic function is normal with a 70% estimated ejection fraction.  2. The left atrium is moderately dilated.  3. Left ventricular diastolic filling indeterminate. QUANTITATIVE DATA SUMMARY: 2D MEASUREMENTS:                          Normal Ranges: LAs:           4.50 cm   (2.7-4.0cm) IVSd:          0.61 cm   (0.6-1.1cm) LVPWd:         0.88 cm   (0.6-1.1cm) LVIDd:         3.66 cm   (3.9-5.9cm) LV Mass Index: 41.9 g/m2 LV SYSTOLIC FUNCTION BY 2D PLANIMETRY (MOD):                     Normal Ranges: EF-A4C View: 68.3 % (>=55%) LV DIASTOLIC FUNCTION:                     Normal Ranges: MV Peak E: 1.19 m/s (0.7-1.2 m/s) MV Peak A: 0.90 m/s (0.42-0.7 m/s) E/A Ratio: 1.32     (1.0-2.2) MITRAL VALVE:                 Normal Ranges: MV DT: 192 msec (150-240msec) AORTIC VALVE:                         Normal Ranges: AoV Vmax:      0.84 m/s (<=1.7m/s) AoV Peak P.8 mmHg (<20mmHg) LVOT Max Shimon:  0.47 m/s (<=1.1m/s) LVOT Diameter: 1.90 cm  (1.8-2.4cm) AoV Area,Vmax: 1.57 cm2 (2.5-4.5cm2) TRICUSPID VALVE/RVSP:                   Normal Ranges: IVC Diam: 1.05 cm  [id number] Fausto Rowe DO Electronically signed on 2024 at 2:53:04 PM  ** Final **     XR foot left 3+  views    Result Date: 1/21/2024  Interpreted By:  Real Mendieta, STUDY: XR FOOT LEFT 1-2 VIEWS; 1/21/2024 4:15 pm   INDICATION: Signs/Symptoms:osteomyelitis. Pain   COMPARISON: 12/07/2023   ACCESSION NUMBER(S): [id number]   ORDERING CLINICIAN: BAN GIFFORD   TECHNIQUE: Views:  AP, Lat, Oblique, left foot   FINDINGS: RESULT: There is no evidence for fracture or dislocation. 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 Mild hammertoe deformities. Joint spaces appear adequately maintained. Soft tissue ulceration of the posterior aspect of the heel however no evidence for bony erosion to suggest osteomyelitis.       No evidence for acute fracture or acute bony erosion to suggest osteomyelitis. Chronic changes as described.   Signed by: Real Mendieta 1/21/2024 7:15 PM Dictation workstation:   [id number]    CT chest abdomen pelvis wo IV contrast    Result Date: 1/21/2024  Interpreted By:  Maria Garcia, STUDY: CT CHEST ABDOMEN PELVIS WO CONTRAST;  1/20/2024 11:42 pm   INDICATION: Mental status change. Elevated white blood cell count with infectious workup.   COMPARISON: 08/20/2023   ACCESSION NUMBER(S): [id number]   ORDERING CLINICIAN: BAN GIFFORD   TECHNIQUE: CT of the chest, abdomen and pelvis was performed with no oral or intravenous contrast administered. Sagittal and coronal reformations were completed by the technologist at the acquisition scanner.   All CT examinations are performed with 1 or more of the following dose reduction techniques: Automated exposure control, adjustment of mA and/or kv according to patient's size, or use of iterative reconstruction techniques.   FINDINGS: Please note that the study is limited without intravenous contrast.   CHEST: Bilateral pleural effusions are present with right effusion moderately small in size and with the left effusion moderately small in size as well. There is shift of the heart and mediastinum to the left of midline due to  atelectasis of the left upper lobe. There is consolidation throughout left lower lobe with air bronchograms noted. On the right, there is compressive atelectasis seen posteriorly in the right lower lobe.   There is mild reactive mediastinal adenopathy seen at this time with mediastinal lymph nodes increased in size since prior study. Several of these mediastinal nodes are at the upper limits of normal measuring 8 mm in short axis diameter.   No pericardial effusion is present. The cardiac size is normal with mitral annulus calcification.   There has been prior bilateral mastectomy with reconstruction of the left breast with implant placement. Surgical clips are visible within the left axilla. Stent grafts are visible within the left upper extremity and within the left innominate, subclavian as well as axillary veins.   A peripherally calcified 1.8 cm nodule arises from the lower pole of left thyroid lobe.   ABDOMEN:   LIVER: Unremarkable.   BILE DUCTS: No intrahepatic or extrahepatic biliary ductal dilatation is seen.   GALLBLADDER: Cholelithiasis is observed with some calcification of the gallbladder wall demonstrated as well. No gallbladder wall thickening or pericholecystic fluid is evident.   PANCREAS: Unremarkable   SPLEEN: Unremarkable   ADRENAL GLANDS: Unremarkable   KIDNEYS AND URETERS: Kidneys are small in size with extensive renal artery calcification demonstrated.   PELVIS:   BLADDER: Amos catheter is present within the decompressed urinary bladder.   REPRODUCTIVE ORGANS: Calcification of the arcuate arteries within the uterus is seen. There is no uterine enlargement or adnexal mass.   BOWEL: A rectal balloon is seen. There is no abnormal distention of the intestinal tract.   VESSELS: There is atherosclerosis of the thoracoabdominal aorta and iliac arteries with calcification in the walls of the celiac, splenic, hepatic, and mesenteric arteries.   PERITONEUM/RETROPERITONEUM/LYMPH NODES: There is a  minimal amount of ascites seen about the liver and spleen with mild presacral edema noted.   ABDOMINAL WALL: There is anasarca involving the soft tissues of the abdomen and pelvis. Postoperative change from ventral hernia repair is seen.   BONES: Bilateral sacroiliitis is seen. There is lumbar dextroscoliosis. Chronic rotator cuff tear is present bilaterally with osteoarthritis of both shoulders, right greater than left.       Chest 1.  Moderately small bilateral pleural effusions with left upper lobe atelectasis and compressive atelectasis seen posteriorly in right lower lobe. A left lower lobe pneumonia is identified. There is extensive airspace consolidation within left lower lobe with air bronchograms observed. Mild mediastinal reactive adenopathy is present as well.   Abdomen-Pelvis 1.  Cholelithiasis without evidence of cholecystitis. 2. Small amount of ascites with mild presacral edema and anasarca within the soft tissues of the abdominal wall. 3. Renal atrophy with extensive vascular calcifications.     MACRO: None   Signed by: Maria Garcia 1/21/2024 8:30 AM Dictation workstation:   [id number]    CT head wo IV contrast    Result Date: 1/21/2024  Interpreted By:  Maria Garcia, STUDY: CT HEAD WO IV CONTRAST 1/20/2024 11:42 pm   INDICATION: Signs/Symptoms:mental status changes   COMPARISON: 12/11/2023   ACCESSION NUMBER(S): [id number]   ORDERING CLINICIAN: BAN GIFFORD   TECHNIQUE: Unenhanced axial images of the brain are completed.   All CT examinations are performed with 1 or more of the following dose reduction techniques: Automated exposure control, adjustment of mA and/or kv according to patient's size, or use of iterative reconstruction techniques.   FINDINGS: Helical unenhanced axial images of the brain demonstrate a mild to moderate degree of ventricular enlargement with proportionate widening of the sulci and sylvian fissures. There is no midline shift, mass effect, extra-axial fluid collection, or  acute intracranial hemorrhage. There is diminished density seen in the periventricular white matter indicating chronic microvascular ischemic disease. There is calcified plaque seen within the distal vertebral and internal carotid arteries bilaterally. No calvarial abnormality is seen.       Atrophy and chronic microvascular ischemic disease without acute intracranial process.   Signed by: Maria Garcia 1/21/2024 8:09 AM Dictation workstation:   [id number]    XR chest 1 view    Result Date: 1/20/2024  Interpreted By:  Brendon Perez, STUDY: XR CHEST 1 VIEW; 1/20/2024 5:03 pm   INDICATION: CLINICAL INFORMATION: Signs/Symptoms:Insertion right IJ central line, also left thoracentesis.   COMPARISON: 01/19/2024 at 1338 hours   ACCESSION NUMBER(S): [id number]   ORDERING CLINICIAN: BOZENA ANTONIO   TECHNIQUE: Portable chest one view.   FINDINGS: The cardiac size is indeterminate in view of the AP projection. There is no change in the right-sided dual port central venous catheter. There is a new right internal jugular venous catheter present with the tip at approximately same level as the dual port central venous catheter, overlying the mid to lower right atrium. There is no evidence for pneumothorax. Patient has a history of left thoracentesis without evidence for pneumothorax on the left. There is bilateral basilar alveolar infiltrate and effusions. Left effusion is decreased compared to the study from 1 day earlier.   Surgical clips are identified within left chest wall. Endovascular stent is identified in the distribution of the left subclavian artery.       1. Status post right-sided central venous catheter placement as described above with the tip overlying the mid to caudal aspect of the right atrium. There is no evidence for pneumothorax. 2. No evidence for left-sided pneumothorax after left-sided thoracentesis. Decrease in the left effusion. 3. Bilateral basilar infiltrates and effusions are present. Follow-up to  assure complete clearing is suggested.   MACRO: none   Signed by: Brendon Perez 1/20/2024 5:11 PM Dictation workstation:   [id number]    XR chest 1 view    Result Date: 1/19/2024  Interpreted By:  Real Mendieta, STUDY: XR CHEST 1 VIEW; 1/19/2024 1:38 pm   INDICATION: Signs/Symptoms:dyspnea.   COMPARISON: 12/26/2023   ACCESSION NUMBER(S): [id number]   ORDERING CLINICIAN: EMELY GOLDSMITH   TECHNIQUE: 1 view of the chest was performed.   FINDINGS: There may be a minimal right pleural effusion. Slight prominence of the interstitium otherwise the right lung is clear. Improved appearance of the left lung with improved aeration of the right upper lobe. There is opacification of the left lower lobe possibly due to large pleural effusion which is similar to the prior study. No pneumothorax. Right-sided dual lumen central line catheter with tip at the proximal atrium. The cardiomediastinal silhouette is within normal limits. Left-sided subclavian stents are again noted.       Improved aeration and appearance of the left lung superiorly however there is a persistent large left pleural effusion and opacification of the left lower lobe.   Signed by: Real Mendieta 1/19/2024 1:44 PM Dictation workstation:   [id number]         Assessment / Plan       PROBLEM LIST:  - End stage heart failure with preserved EF - on vasopressors  - End stage renal disease on HD  - Cardiogenic shock - will continue to wean down levo  - Acute metabolic encephalopathy  - Failure to wean from vasopressors  - Goals of care discussion    MANAGEMENT PLAN:  - Start weaning levo and assess mental status  - Received HD today  - Check lactate bid  - Start Fluorinef tid  - Discussed at length with daughter about the end stage nature of her disease with multiple organ failure  - Palliative care consult  - Cardiology consultation - patient's daughter declined a RHC  - Peer to peer done with insurance, NOT approved  - Family will update us with goals of  care  - Cont Doxy and Vanc PO. Appreciate ID consult.  - Routine ICU care  - PT/OT    I have personally interviewed and examined the patient.  I have personally verified elements of the exam listed above. Interval changes or irregularities are as noted.  I have personally and independently reviewed laboratory, radiographic and procedural data.  I have personally reviewed the problem list above and concur. Changes, if any, are noted.  I have personally reviewed the plan list above and concur. Changes, if any, are noted.    The patient has high probability of compromise including but not limited to organ system failure, mechanical respiratory and circulatory support, cardiac arrest and death. I have discussed this in detail with the family / caregivers.    I have personally spent 50 minutes of face to face critical care time (not including billable procedures billed separately) in taking care of the patient.  I have personally answered all questions to the satisfaction of the patient and / or family members to their satisfaction.        Minh Shane MD

## 2024-02-09 NOTE — PROGRESS NOTES
Patient not medically clear. Patient remains in the ICU on pressors. Patient has been denied for LTACH. Patient and family declined hospice. Family declining cardiac catheterization. At this time there is not a safe discharge plan in place. Palliative care following. Will follow.     **PATIENT DOES NOT HAVE A SAFE DISCHARGE PLAN      Shauna Jensen RN

## 2024-02-09 NOTE — PROGRESS NOTES
CONSULT PROGRESS NOTES    SERVICE DATE: 2/9/2024   SERVICE TIME: 9:58 AM    CONSULTING SERVICE: Nephrology    ASSESSMENT AND PLAN   1.  End-stage renal disease  2.  Hypotension  3.  Hyponatremia  4.  Hypokalemia  5.  Anemia of chronic kidney disease     Dialysis today with [facility] again with low temperature and using mild fluid removal.  Titrate norepinephrine to achieve a systolic blood pressure greater than 70 during the treatment.  Use IV albumin as well.  Hyponatremia from volume overload in this patient with anuria.  Hypokalemia improved, now using 3K bath.  Hemoglobin is low, use Retacrit 10,000 units IV 3 times a week with dialysis.  She remains dependent on norepinephrine infusion otherwise her blood pressure drops dangerously low.  I have discussed this poor prognosis numerous times with the patient and her daughter.  The patient's goals remain to see her grandchildren is much as possible.  She does not seem ready for hospice care and withdrawal from dialysis.  LTAC placement was denied by insurance.  She has met with hospice, but did not desire to pursue their services.  She does not need, nor do I believe she would derive any benefit from CRRT.  Magnesium balance has improved.  None of her options for disposition are great.  She has a poor overall prognosis, but it has been difficult to get the patient and her daughter/family to understand this.  I have had numerous discussions with the patient about where to proceed from here, but her main goals continue to be to see her grandchildren as long as possible while she is still is without significant pain.  She remains full code, despite numerous attempts from ICU team, palliative care, and myself to redefine and be more realistic with her goals of care.  Dr. Andrade is available tomorrow.  No dialysis needs anticipated this weekend.    SUBJECTIVE  INTERVAL HPI: She feels well.  Titrating the norepinephrine infusion down.  Blood pressure has been in the 50s  systolic.  She seems to be mentating okay.  3 of her grandchildren are present during my evaluation.  She denies pain.  No major dyspnea.    MEDICATIONS:  albumin human, 25 g, intravenous, q1h  [Held by provider] apixaban, 2.5 mg, oral, BID  doxycylcine, 100 mg, oral, Daily  epoetin di or biosimilar, 10,000 Units, intravenous, Every Mon/Wed/Fri  escitalopram, 10 mg, oral, Nightly  fludrocortisone, 0.1 mg, oral, TID  gabapentin, 100 mg, oral, TID  heparin, 2,000 Units, intra-catheter, After Dialysis  heparin, 2,000 Units, intra-catheter, After Dialysis  heparin, 2,000 Units, intra-catheter, After Dialysis  heparin, 2,000 Units, intra-catheter, After Dialysis  insulin lispro, 0-10 Units, subcutaneous, TID with meals  ipratropium-albuteroL, 3 mL, nebulization, q6h while awake  lidocaine, 5 mL, infiltration, Once  midodrine, 15 mg, oral, TID with meals  nystatin, , Topical, BID  nystatin, , Topical, BID  pantoprazole, 40 mg, oral, Daily   Or  pantoprazole, 40 mg, intravenous, Daily  perflutren lipid microspheres, 0.5-10 mL of dilution, intravenous, Once in imaging  perflutren protein A microsphere, 0.5 mL, intravenous, Once in imaging  psyllium, 1 packet, oral, BID  sodium chloride, 3 mL, nebulization, TID  sulfur hexafluoride microsphr, 2 mL, intravenous, Once in imaging  sulfur hexafluoride microsphr, 2 mL, intravenous, Once in imaging  vancomycin, 125 mg, oral, BID  zinc oxide, 1 Application, Topical, BID       norepinephrine, 0.01-3 mcg/kg/min, Last Rate: 0.05 mcg/kg/min (02/09/24 0934)       PRN medications: acetaminophen, alteplase, ammonium lactate, benzocaine-menthoL, dextrose 10 % in water (D10W), dextrose, glucagon, ondansetron, oxygen, sennosides-docusate sodium     OBJECTIVE  PHYSICAL EXAM:   Heart Rate:  [0-111]   Temp:  [36.4 °C (97.5 °F)-36.7 °C (98.1 °F)]   Resp:  [0-24]   BP: (42-93)/(20-75)   Weight:  [99.5 kg (219 lb 5.7 oz)]   SpO2:  [72 %-100 %]   Body mass index is 40.12 kg/m².  Chronically  ill-appearing elderly white woman  Pale skin  Right-sided hand swelling  Left-sided finger amputation  Internal jugular tunneled hemodialysis catheter in place  There is no significant pretibial edema on both lower extremities  Soft abdomen  No Amos  No obvious joint deformities  Moist mucosa  Hearing seems to be intact  Phonation intact  Rectal tube in place       DATA:   Labs:  Results for orders placed or performed during the hospital encounter of 01/19/24 (from the past 96 hour(s))   POCT GLUCOSE   Result Value Ref Range    POCT Glucose 196 (H) 74 - 99 mg/dL   POCT GLUCOSE   Result Value Ref Range    POCT Glucose 132 (H) 74 - 99 mg/dL   POCT GLUCOSE   Result Value Ref Range    POCT Glucose 187 (H) 74 - 99 mg/dL   CBC   Result Value Ref Range    WBC 11.1 4.4 - 11.3 x10*3/uL    nRBC 0.0 0.0 - 0.0 /100 WBCs    RBC 2.91 (L) 4.00 - 5.20 x10*6/uL    Hemoglobin 8.8 (L) 12.0 - 16.0 g/dL    Hematocrit 26.4 (L) 36.0 - 46.0 %    MCV 91 80 - 100 fL    MCH 30.2 26.0 - 34.0 pg    MCHC 33.3 32.0 - 36.0 g/dL    RDW 22.7 (H) 11.5 - 14.5 %    Platelets 169 150 - 450 x10*3/uL   Comprehensive metabolic panel   Result Value Ref Range    Glucose 188 (H) 65 - 99 mg/dL    Sodium 133 133 - 145 mmol/L    Potassium 4.1 3.4 - 5.1 mmol/L    Chloride 99 97 - 107 mmol/L    Bicarbonate 21 (L) 24 - 31 mmol/L    Urea Nitrogen 13 8 - 25 mg/dL    Creatinine 2.40 (H) 0.40 - 1.60 mg/dL    eGFR 21 (L) >60 mL/min/1.73m*2    Calcium 8.5 8.5 - 10.4 mg/dL    Albumin 2.5 (L) 3.5 - 5.0 g/dL    Alkaline Phosphatase 353 (H) 35 - 125 U/L    Total Protein 5.7 (L) 5.9 - 7.9 g/dL    AST 66 (H) 5 - 40 U/L    Bilirubin, Total 0.9 0.1 - 1.2 mg/dL    ALT 20 5 - 40 U/L    Anion Gap 13 <=19 mmol/L   Magnesium   Result Value Ref Range    Magnesium 1.70 1.60 - 3.10 mg/dL   POCT GLUCOSE   Result Value Ref Range    POCT Glucose 162 (H) 74 - 99 mg/dL   POCT GLUCOSE   Result Value Ref Range    POCT Glucose 147 (H) 74 - 99 mg/dL   Transthoracic Echo (TTE) Limited   Result  Value Ref Range    AV pk david 1.15 m/s    LVOT diam 1.90 cm    LV biplane EF 68 %    LA vol index A/L 31.3 ml/m2    LVIDd 2.98 cm    AV pk grad 5.3 mmHg    Aortic Valve Area by Continuity of Peak Velocity 2.74 cm2    LV A4C EF 68.2    POCT GLUCOSE   Result Value Ref Range    POCT Glucose 224 (H) 74 - 99 mg/dL   POCT GLUCOSE   Result Value Ref Range    POCT Glucose 177 (H) 74 - 99 mg/dL   POCT GLUCOSE   Result Value Ref Range    POCT Glucose 186 (H) 74 - 99 mg/dL   POCT GLUCOSE   Result Value Ref Range    POCT Glucose 173 (H) 74 - 99 mg/dL   POCT GLUCOSE   Result Value Ref Range    POCT Glucose 216 (H) 74 - 99 mg/dL   Transthoracic Echo (TTE) Limited   Result Value Ref Range    MV E/A ratio 1.10     BSA 2.1 m2   POCT GLUCOSE   Result Value Ref Range    POCT Glucose 239 (H) 74 - 99 mg/dL   POCT GLUCOSE   Result Value Ref Range    POCT Glucose 201 (H) 74 - 99 mg/dL   POCT GLUCOSE   Result Value Ref Range    POCT Glucose 246 (H) 74 - 99 mg/dL   BLOOD GAS LACTIC ACID, VENOUS   Result Value Ref Range    POCT Lactate, Venous 2.1 (H) 0.4 - 2.0 mmol/L   Basic metabolic panel   Result Value Ref Range    Glucose 178 (H) 65 - 99 mg/dL    Sodium 130 (L) 133 - 145 mmol/L    Potassium 3.5 3.4 - 5.1 mmol/L    Chloride 98 97 - 107 mmol/L    Bicarbonate 22 (L) 24 - 31 mmol/L    Urea Nitrogen 17 8 - 25 mg/dL    Creatinine 2.70 (H) 0.40 - 1.60 mg/dL    eGFR 18 (L) >60 mL/min/1.73m*2    Calcium 8.0 (L) 8.5 - 10.4 mg/dL    Anion Gap 10 <=19 mmol/L   Blood Gas Lactic Acid, Venous   Result Value Ref Range    POCT Lactate, Venous 2.0 0.4 - 2.0 mmol/L   POCT GLUCOSE   Result Value Ref Range    POCT Glucose 156 (H) 74 - 99 mg/dL   CBC   Result Value Ref Range    WBC 14.5 (H) 4.4 - 11.3 x10*3/uL    nRBC 0.0 0.0 - 0.0 /100 WBCs    RBC 2.79 (L) 4.00 - 5.20 x10*6/uL    Hemoglobin 8.6 (L) 12.0 - 16.0 g/dL    Hematocrit 27.6 (L) 36.0 - 46.0 %    MCV 99 80 - 100 fL    MCH 30.8 26.0 - 34.0 pg    MCHC 31.2 (L) 32.0 - 36.0 g/dL    RDW 22.7 (H) 11.5 -  14.5 %    Platelets 239 150 - 450 x10*3/uL   POCT GLUCOSE   Result Value Ref Range    POCT Glucose 172 (H) 74 - 99 mg/dL         SIGNATURE: Saeed Mondragon MD PATIENT NAME: Ayanna Gross   DATE: February 9, 2024 MRN: [id number]   TIME: 9:58 AM PAGER: [phone]

## 2024-02-09 NOTE — PROGRESS NOTES
Ayanna Gross is a 73 y.o. female on day 21 of admission presenting with Septic shock (CMS/HCC).    Subjective   Interval History:   Patient seen and examined  Remains on Levophed  Patient discussed with nurse      Review of Systems   Unable to perform ROS: Mental status change       Objective   Range of Vitals (last 24 hours)  Heart Rate:  [0-107]   Temp:  [36.4 °C (97.5 °F)-36.7 °C (98.1 °F)]   Resp:  [0-24]   BP: (49-93)/(20-74)   Weight:  [99.5 kg (219 lb 5.7 oz)]   SpO2:  [72 %-100 %]   Daily Weight  02/09/24 : 99.5 kg (219 lb 5.7 oz)    Body mass index is 40.12 kg/m².    Physical Exam  Constitutional:       Appearance: Ill-appearing  HENT:      Head: Normocephalic and atraumatic.      Nose: Nose normal.   Eyes:      Extraocular Movements: Extraocular movements intact.      Conjunctiva/sclera: Conjunctivae normal.   Cardiovascular:      Heart sounds: Normal heart sounds, S1 normal and S2 normal.   Pulmonary:      Breath sounds: Decreased breath sounds present.   Abdominal:      General: Bowel sounds are normal.      Palpations: Abdomen is soft.   Musculoskeletal:      Cervical back: Normal range of motion and neck supple.   Skin:     Comments: Stage III sacral ulcer, bilateral posterior heel eschar -photos examined  Neurological:      Mental Status: She is less agitated    Antibiotics  aspirin tablet 325 mg  acetaminophen (Tylenol) tablet 650 mg  cefepime (Maxipime) 1 g in dextrose 5 % 50 mL IV  sodium chloride 0.9 % bolus 2,229 mL  apixaban (Eliquis) tablet 2.5 mg  escitalopram (Lexapro) tablet 10 mg  febuxostat (Uloric) tablet 40 mg  fenofibrate (Triglide) tablet 160 mg  gabapentin (Neurontin) capsule 100 mg  midodrine (Proamatine) tablet 15 mg  nystatin (Mycostatin) 100,000 unit/gram powder  oxyCODONE-acetaminophen (Percocet) 5-325 mg per tablet 1 tablet  simvastatin (Zocor) tablet 20 mg  piperacillin-tazobactam-dextrose (Zosyn) IV 2.25 g  vancomycin (Vancocin) capsule 125 mg  oxygen (O2) therapy  dextrose  50 % injection 25 g  glucagon (Glucagen) injection 1 mg  dextrose 10 % in water (D10W) infusion  insulin lispro (HumaLOG) injection 0-10 Units  nystatin (Mycostatin) 100,000 unit/gram powder 1 Application  vancomycin-diluent combo no.1 (Xellia) IVPB 1,750 mg  piperacillin-tazobactam-dextrose (Zosyn) IV 2.25 g  sodium chloride 0.9 % bolus 500 mL  norepinephrine (Levophed) 8 mg in dextrose 5% 250 mL (0.032 mg/mL) infusion (premix)  vancomycin (Vancocin) placeholder  pantoprazole (ProtoNix) EC tablet 40 mg  pantoprazole (ProtoNix) injection 40 mg  sennosides-docusate sodium (Judy-Colace) 8.6-50 mg per tablet 1 tablet  doxycycline (Vibramycin) in dextrose 5 % in water (D5W) 100 mL  mg  LORazepam (Ativan) injection 2 mg  magnesium sulfate IV 2 g  zinc oxide 20 % ointment 1 Application  nystatin (Mycostatin) cream  norepinephrine (Levophed) 8 mg in dextrose 5% 250 mL (0.032 mg/mL) infusion (premix)  heparin 1,000 unit/mL injection 2,000 Units  heparin 1,000 unit/mL injection 2,000 Units  nystatin (Mycostatin) ointment  acetaminophen (Tylenol) oral liquid 1,000 mg  albumin human 25 % solution 12.5 g  perflutren lipid microspheres (Definity) injection 0.5-10 mL of dilution  sulfur hexafluoride microsphr (Lumason) injection 24.28 mg  perflutren protein A microsphere (Optison) injection 0.5 mL  ipratropium-albuteroL (Duo-Neb) 0.5-2.5 mg/3 mL nebulizer solution 3 mL  sodium chloride 3 % nebulizer solution 3 mL  vancomycin-diluent combo no.1 (Xellia) IVPB 750 mg  sennosides-docusate sodium (Judy-Colace) 8.6-50 mg per tablet 1 tablet  acetaminophen (Tylenol) tablet 650 mg  doxycycline (Vibramycin) capsule 100 mg  magnesium oxide (Mag-Ox) tablet 400 mg  vasopressin (Vasostrict) 0.2 unit/mL infusion  norepinephrine (Levophed) 8 mg in dextrose 5% 250 mL (0.032 mg/mL) infusion (premix)  heparin 1,000 unit/mL injection 2,000 Units  heparin 1,000 unit/mL injection 2,000 Units  albumin human 25 % solution 12.5 g  vancomycin  (Xellia) 1 g in 200 mL (Xellia) IVPB 1 g  ipratropium-albuteroL (Duo-Neb) 0.5-2.5 mg/3 mL nebulizer solution 3 mL  sodium chloride 3 % nebulizer solution 3 mL  albumin human 5 % infusion 12.5 g  heparin 1,000 unit/mL injection 2,000 Units  heparin 1,000 unit/mL injection 2,000 Units  vancomycin (Vancocin) capsule 125 mg  albumin human 25 % solution 12.5 g  heparin 1,000 unit/mL injection 2,000 Units  heparin 1,000 unit/mL injection 2,000 Units  ipratropium-albuteroL (Duo-Neb) 0.5-2.5 mg/3 mL nebulizer solution 3 mL  lidocaine (Xylocaine) 10 mg/mL (1 %) injection 50 mg  sodium phosphate 15 mmol in sodium chloride 0.9% 250 mL IV  sod phos di, mono-K phos mono (K Phos Neutral) tablet 250 mg  potassium, sodium phosphates (Phos-NaK) 280-160-250 mg packet 1 packet  doxycycline (Vibramycin) capsule 100 mg  fludrocortisone (Florinef) tablet 0.1 mg  gabapentin (Neurontin) capsule 100 mg  acetaminophen (Tylenol) tablet 1,000 mg  benzocaine-menthoL (Dermoplast) topical spray  vancomycin (Vancocin) capsule 125 mg  heparin 1,000 unit/mL injection 2,000 Units  heparin 1,000 unit/mL injection 2,000 Units  magnesium sulfate IV 2 g  albumin human 25 % solution 12.5 g  promethazine (Phenergan) injection 12.5 mg  ondansetron (Zofran) injection 4 mg  lidocaine (Xylocaine) 10 mg/mL (1 %) injection 50 mg  alteplase (Cathflo Activase) injection 2 mg  acetaminophen (Tylenol) tablet 650 mg  psyllium (Metamucil) 3.4 gram packet 1 packet  perflutren lipid microspheres (Definity) injection 0.5-10 mL of dilution  sulfur hexafluoride microsphr (Lumason) injection 24.28 mg  perflutren protein A microsphere (Optison) injection 0.5 mL  epoetin di-epbx (Retacrit) injection 10,000 Units  sulfur hexafluoride microsphr (Lumason) injection 24.28 mg  perflutren lipid microspheres (Definity) injection 0.5-10 mL of dilution  perflutren lipid microspheres (Definity) injection 0.5-10 mL of dilution  sulfur hexafluoride microsphr (Lumason) injection 24.28  mg  perflutren protein A microsphere (Optison) injection 0.5 mL  ammonium lactate (Lac-Hydrin) 12 % lotion 1 Application  norepinephrine (Levophed) 8 mg in dextrose 5% 250 mL (0.032 mg/mL) infusion (premix)  fludrocortisone (Florinef) tablet 0.1 mg  albumin human 25 % solution 25 g  albumin human 25 % solution 25 g      Relevant Results  Labs  Results from last 72 hours   Lab Units 02/09/24  0537   WBC AUTO x10*3/uL 14.5*   HEMOGLOBIN g/dL 8.6*   HEMATOCRIT % 27.6*   PLATELETS AUTO x10*3/uL 239     Results from last 72 hours   Lab Units 02/08/24  1858   SODIUM mmol/L 130*   POTASSIUM mmol/L 3.5   CHLORIDE mmol/L 98   CO2 mmol/L 22*   BUN mg/dL 17   CREATININE mg/dL 2.70*   GLUCOSE mg/dL 178*   CALCIUM mg/dL 8.0*   ANION GAP mmol/L 10   EGFR mL/min/1.73m*2 18*         Estimated Creatinine Clearance: 21.1 mL/min (A) (by C-G formula based on SCr of 2.7 mg/dL (H)).  C-Reactive Protein   Date Value Ref Range Status   12/25/2023 5.20 (H) 0.00 - 2.00 mg/dL Final     CRP   Date Value Ref Range Status   06/23/2023 19.9 (H) 0 - 2.0 MG/DL Final     Comment:     Performed at [facility] [id number]   05/22/2022 1.0 0 - 2.0 MG/DL Final     Comment:     Performed at [facility] [id number]     Microbiology  Reviewed  Imaging  Transthoracic Echo (TTE) Limited    Result Date: 2/7/2024           [place] [address]            Phone [phone] TRANSTHORACIC ECHOCARDIOGRAM REPORT  Patient Name:      BASIA Gray Physician:    [id number] Littlejohn DO Study Date:        2/7/2024            Ordering Provider:    [id number] LITTLEJOHN MRN/PID:           [id number]            Fellow: Accession#:        [id number]        Nurse: Date of Birth/Age: 1950 / 73 years Sonographer:          Rena                                                               Sadaf ACS,                                                              RDCS, FASE Gender:            F                   Additional Staff: Height:            157.48 cm           Admit Date: Weight:                                Admission Status:     Inpatient - Routine BSA:               m2                  Department Location:  [facility] ICU Blood Pressure: 95 /42 mmHg Study Type:    TRANSTHORACIC ECHO (TTE) LIMITED Diagnosis/ICD: Hypotension, unspecified-I95.9; Mitral valve disorder-I05.9 Indication:    Limited to assess for MS, Hypotension CPT Codes:     Echo Limited-25005; Color Doppler-66982; Doppler Limited-68614 Patient History: Pertinent History: Hypotension, hx of MV endocarditis, PAD, DM2 ESRD on Hd. Study Detail: The following Echo studies were performed: 2D, M-Mode, Doppler and               color flow. Technically challenging study due to poor acoustic               windows and L Breast implant. Unable to obtain suprasternal notch               view.  PHYSICIAN INTERPRETATION: Left Ventricle: Left ventricular systolic function is normal. There are no regional wall motion abnormalities. The left ventricular cavity size is normal. Left ventricular diastolic filling was not assessed. Left Atrium: The left atrium is moderate to severely dilated. Right Ventricle: The right ventricle is normal in size. There is normal right ventricular global systolic function. Right Atrium: The right atrium was not well visualized. Aortic Valve: The aortic valve was not assessed. Aortic valve regurgitation was not assessed. Mitral Valve: The mitral valve is abnormal. There is evidence of mild mitral valve stenosis. The doppler estimated mean and peak diastolic pressure gradients are 4.0 mmHg and 6.6 mmHg respectively. There is severe mitral annular calcification. There is no evidence of mitral valve regurgitation. Tricuspid Valve: The tricuspid valve was not assessed.  Tricuspid regurgitation was not assessed. Pulmonic Valve: The pulmonic valve is not well visualized. The pulmonic valve regurgitation was not assessed. Pericardium: There is no pericardial effusion noted. Aorta: The aortic root was not assessed.  CONCLUSIONS:  1. Left ventricular systolic function is normal.  2. The left atrium is moderate to severely dilated.  3. There is severe mitral annular calcification. QUANTITATIVE DATA SUMMARY: LV DIASTOLIC FUNCTION:                        Normal Ranges: MV Peak E:    1.23 m/s (0.7-1.2 m/s) MV Peak A:    1.12 m/s (0.42-0.7 m/s) E/A Ratio:    1.10     (1.0-2.2) MV lateral e' 0.04 m/s MV medial e'  0.07 m/s MITRAL VALVE:                      Normal Ranges: MV Vmax:    1.28 m/s (<=1.3m/s) MV peak P.6 mmHg (<5mmHg) MV mean P.0 mmHg (<48mmHg)  RIGHT VENTRICLE: RV Basal 2.97 cm  [id number] Lemus DO Electronically signed on 2024 at 3:50:21 PM  ** Final **     Transthoracic Echo (TTE) Limited    Result Date: 2024           [place] [address]            Phone [phone] TRANSTHORACIC ECHOCARDIOGRAM REPORT  Patient Name:      BASIA GUAN GARY Gray Physician:    [id number] Lemus DO Study Date:        2024            Ordering Provider:    [id number] REBECA RESENDEZ MRN/PID:           [id number]            Fellow: Accession#:        [id number]        Nurse: Date of Birth/Age: 1950 / 73 years Sonographer:          Jennifer WALL Gender:            F                   Additional Staff: Height:            157.48 cm           Admit Date:           2024 Weight:            98.88 kg            Admission Status:     Inpatient - Routine BSA:               1.98 m2             Department Location: Blood Pressure: 59 /49 mmHg Study Type:    TRANSTHORACIC ECHO (TTE) LIMITED Diagnosis/ICD:  Acute on chronic diastolic (congestive) heart failure                (CHF)-I50.33 Indication:    Acute on chronic diastolic congestive heart failure CPT Codes:     Echo Limited-46040; Color Doppler-52431; Doppler Limited-93632 Patient History: Pertinent History: PAF Peripheralvascular disease low blood pressure HTN Breast                    Cancer Mixed Hyperlipidemia HF CVA Hypotension DMII CAD CKD. Study Detail: The following Echo studies were performed: 2D, M-Mode, Doppler and               color flow. Technically challenging study due to body habitus,               patient lying in supine position, poor acoustic windows, prominent               lung artifact and Breast Prosthesis. Definity used as a contrast               agent for endocardial border definition. Unable to obtain               suprasternal notch view.  PHYSICIAN INTERPRETATION: Left Ventricle: Left ventricular systolic function is normal, with an estimated ejection fraction of 60-65%. There are no regional wall motion abnormalities. The left ventricular cavity size is normal. Left ventricular diastolic filling was indeterminate. Left Atrium: The left atrium is moderately dilated. Right Ventricle: The right ventricle is normal in size. There is normal right ventricular global systolic function. Right Atrium: The right atrium is normal in size. Aortic Valve: The aortic valve is trileaflet. There is no evidence of aortic valve regurgitation. The peak instantaneous gradient of the aortic valve is 5.3 mmHg. Mitral Valve: The mitral valve is normal in structure. There is severe mitral annular calcification. There is trace mitral valve regurgitation. Tricuspid Valve: The tricuspid valve is structurally normal. There is mild tricuspid regurgitation. Pulmonic Valve: The pulmonic valve is not well visualized. The pulmonic valve regurgitation was not well visualized. Pericardium: There is no pericardial effusion noted. Aorta: The aortic root is normal.   CONCLUSIONS:  1. Left ventricular systolic function is normal with a 60-65% estimated ejection fraction.  2. The left atrium is moderately dilated.  3. There is severe mitral annular calcification. QUANTITATIVE DATA SUMMARY: 2D MEASUREMENTS:                          Normal Ranges: LAs:           3.80 cm   (2.7-4.0cm) IVSd:          0.92 cm   (0.6-1.1cm) LVPWd:         1.09 cm   (0.6-1.1cm) LVIDd:         2.98 cm   (3.9-5.9cm) LVIDs:         2.12 cm LV Mass Index: 41.2 g/m2 LV % FS        28.9 % LA VOLUME:                               Normal Ranges: LA Vol A4C:        58.3 ml    (22+/-6mL/m2) LA Vol A2C:        60.9 ml LA Vol BP:         62.1 ml LA Vol Index A4C:  29.4ml/m2 LA Vol Index A2C:  30.7 ml/m2 LA Vol Index BP:   31.3 ml/m2 LA Area A4C:       20.5 cm2 LA Area A2C:       20.1 cm2 LA Major Axis A4C: 6.1 cm LA Major Axis A2C: 5.6 cm LA Volume Index:   29.3 ml/m2 LA Vol A4C:        53.1 ml LA Vol A2C:        58.4 ml LV SYSTOLIC FUNCTION BY 2D PLANIMETRY (MOD):                     Normal Ranges: EF-A4C View: 68.2 % (>=55%) EF-A2C View: 65.9 % EF-Biplane:  67.8 % AORTIC VALVE:                         Normal Ranges: AoV Vmax:      1.15 m/s (<=1.7m/s) AoV Peak P.3 mmHg (<20mmHg) LVOT Max Shimon:  1.11 m/s (<=1.1m/s) LVOT VTI:      16.20 cm LVOT Diameter: 1.90 cm  (1.8-2.4cm) AoV Area,Vmax: 2.74 cm2 (2.5-4.5cm2) PULMONIC VALVE:                         Normal Ranges: PV Accel Time: 63 msec  (>120ms) PV Max Shimon:    1.0 m/s  (0.6-0.9m/s) PV Max P.3 mmHg  [id number] Herminio Lemus DO Electronically signed on 2024 at 7:58:25 AM  ** Final **     Upper extremity venous duplex bilateral    Result Date: 2024           [facility] [id number] [place], [place] [address]            Phone [phone]  Vascular Lab Report  [facility] UPPER EXTREMITY VENOUS DUPLEX BILATERAL Patient Name:      BASIA Gray Physician:  [id number] Busby                                                             MD, ALICE Study Date:        1/25/2024           Ordering Provider:  [id number] ERI PINO CULLEN MRN/PID:           [id number]            Fellow: Accession#:        [id number]        Technologist:       Dara Parr RVRICHIE Date of Birth/Age: 1950 / 73 years Technologist 2: Gender:            F                   Encounter#:         [id number] Admission Status:  Inpatient           Location Performed: [facility]  Diagnosis/ICD: Right arm swelling-M79.89; Left arm swelling-M79.89 CPT Codes:     71010 Peripheral venous duplex scan for DVT complete  CONCLUSIONS:  Right Upper Venous: No evidence of acute deep vein thrombus visualized in the right upper extremity. Internal jugular vein was visualized in segments due to IV lines and bandages. Left Upper Venous: No evidence of acute deep vein thrombus visualized in the left upper extremity. Subclavian stent is noted and appears patent. There is a known occluded dialysis access noted.  Additional Findings: Technically difficult exam due to IV lines, bandages, and patient's positioning.  Imaging & Doppler Findings:  Right               Compressible Thrombus        Flow Internal Jugular        Yes        None   Spontaneous/Phasic Subclavian              Yes        None Subclavian Proximal     Yes        None   Spontaneous/Phasic Subclavian Mid          Yes        None Subclavian Distal       Yes        None   Spontaneous/Phasic Axillary                Yes        None       Pulsatile Brachial                Yes        None Cephalic                Yes        None Basilic                 Yes        None  Left                Compress Thrombus        Flow Internal Jugular      Yes      None       Pulsatile Subclavian            Yes      None Subclavian Proximal   Yes      None       Pulsatile Subclavian Mid        Yes      None       Pulsatile Subclavian Distal     Yes      None       Pulsatile Axillary              Yes      None   Spontaneous/Phasic Brachial               Yes      None Cephalic              Yes      None Basilic               Yes      None  [id number] Mini Busby MD, ALICE Electronically signed by [id number] AILCE Kelly MD on 1/25/2024 at 4:06:13 PM  ** Final **     ECG 12 lead    Result Date: 1/24/2024  Sinus tachycardia  with 1st degree AV block Right bundle branch block Possible Lateral infarct , age undetermined Abnormal ECG Confirmed by Yesika Nj ([id number]) on 1/24/2024 6:54:45 AM    XR tibia fibula right 2 views    Result Date: 1/22/2024  Interpreted By:  Real Mendieta, STUDY: XR TIBIA FIBULA RIGHT 2 VIEWS; 1/22/2024 2:15 pm   INDICATION: Signs/Symptoms:evaluate source of infection, osteomyelitis;   COMPARISON: None available.   ACCESSION NUMBER(S): [id number]   ORDERING CLINICIAN: BAN GIFFORD   TECHNIQUE: Views: AP and Lateral of the right tibia and fibula   FINDINGS: RESULT: There is no evidence for fracture or dislocation. Tricompartmental degenerative changes of the right knee. The tibia and fibula appear intact without bony erosion or evidence for osteomyelitis. No other bony or soft tissue abnormality is identified. No subcutaneous gas is noted.       No evidence for acute osseous abnormality. No bony erosion to suggest osteomyelitis.   Signed by: Real Mendieta 1/22/2024 3:15 PM Dictation workstation:   [id number]    XR ankle right 3+ views    Result Date: 1/22/2024  Interpreted By:  Real Mendieta, STUDY: XR ANKLE RIGHT 3+ VIEWS; 1/22/2024 2:15 pm   INDICATION: Signs/Symptoms:Evuluate source of infection, osteomyelitis;   COMPARISON: None available.   ACCESSION NUMBER(S): [id number]   ORDERING CLINICIAN: BAN GIFOFRD   TECHNIQUE: Views: AP, Lateral, Oblique, right ankle   FINDINGS: RESULT: There is no evidence for fracture or dislocation. The ankle mortise is intact. Joint spaces appear adequately maintained. No bony erosion to suggest osteomyelitis. No bone lesion or soft tissue abnormality is identified. No subcutaneous gas is seen.        No evidence for acute osseous abnormality. No bony erosion to suggest osteomyelitis.   Signed by: Real Mendieta 1/22/2024 3:14 PM Dictation workstation:   [id number]    Transthoracic Echo (TTE) Complete    Result Date: 1/22/2024           [place] [id number]            Phone [phone] TRANSTHORACIC ECHOCARDIOGRAM REPORT  Patient Name:      BASIA VEGA      Reading Physician:   [id number] Fausto [facility]  Study Date:        1/22/2024           Ordering Provider:   [id number] ERI BERMAN MRN/PID:           [id number]            Fellow: Accession#:        [id number]        Nurse: Date of Birth/Age: 1950 / 73 years Sonographer:         Tabatha Page RDCS Gender:            F                   Additional Staff: Height:            157.00 cm           Admit Date: Weight:            75.00 kg            Admission Status:    Inpatient - Routine BSA:               1.76 m2             Department Location: [facility] Blood Pressure: 88 /49 mmHg Study Type:    TRANSTHORACIC ECHO (TTE) COMPLETE Diagnosis/ICD: Chronic combined systolic (congestive) and diastolic (congestive)                heart failure (CHF)-I50.42; Sepsis, unspecified organism-A41.9 Indication:    heart failure,septic shock CPT Codes:     Echo Complete w Full Doppler-37893 Patient History: BMI:               Obese >30 Pertinent History: Sepsis, PVD, A-Fib, CVA, Cancer and HTN. breast                    prosthesis/ca, ESRD,anemia,septic shock,heart failure. Study Detail: The following Echo studies were performed: 2D, M-Mode, Doppler and               color flow. Technically challenging study due to prosthesis,               prominent lung artifact and body habitus.  PHYSICIAN INTERPRETATION: Left Ventricle: Left ventricular systolic function is normal, with an estimated ejection fraction of 70%. There are no regional wall motion abnormalities. The  left ventricular cavity size is normal. Left ventricular diastolic filling was indeterminate. Left Atrium: The left atrium is moderately dilated. Right Ventricle: The right ventricle is normal in size. There is normal right ventricular global systolic function. Right Atrium: The right atrium is normal in size. Aortic Valve: The aortic valve is trileaflet. There is no evidence of aortic valve regurgitation. The peak instantaneous gradient of the aortic valve is 2.8 mmHg. Mitral Valve: The mitral valve is normal in structure. There is no evidence of mitral valve regurgitation. Tricuspid Valve: The tricuspid valve is structurally normal. There is trace tricuspid regurgitation. Pulmonic Valve: The pulmonic valve is not well visualized. The pulmonic valve regurgitation was not well visualized. Pericardium: There is no pericardial effusion noted. Aorta: The aortic root is normal.  CONCLUSIONS:  1. Left ventricular systolic function is normal with a 70% estimated ejection fraction.  2. The left atrium is moderately dilated.  3. Left ventricular diastolic filling indeterminate. QUANTITATIVE DATA SUMMARY: 2D MEASUREMENTS:                          Normal Ranges: LAs:           4.50 cm   (2.7-4.0cm) IVSd:          0.61 cm   (0.6-1.1cm) LVPWd:         0.88 cm   (0.6-1.1cm) LVIDd:         3.66 cm   (3.9-5.9cm) LV Mass Index: 41.9 g/m2 LV SYSTOLIC FUNCTION BY 2D PLANIMETRY (MOD):                     Normal Ranges: EF-A4C View: 68.3 % (>=55%) LV DIASTOLIC FUNCTION:                     Normal Ranges: MV Peak E: 1.19 m/s (0.7-1.2 m/s) MV Peak A: 0.90 m/s (0.42-0.7 m/s) E/A Ratio: 1.32     (1.0-2.2) MITRAL VALVE:                 Normal Ranges: MV DT: 192 msec (150-240msec) AORTIC VALVE:                         Normal Ranges: AoV Vmax:      0.84 m/s (<=1.7m/s) AoV Peak P.8 mmHg (<20mmHg) LVOT Max Shimon:  0.47 m/s (<=1.1m/s) LVOT Diameter: 1.90 cm  (1.8-2.4cm) AoV Area,Vmax: 1.57 cm2 (2.5-4.5cm2) TRICUSPID VALVE/RVSP:                    Normal Ranges: IVC Diam: 1.05 cm  [id number] Fausto Rowe DO Electronically signed on 1/22/2024 at 2:53:04 PM  ** Final **     XR foot left 3+ views    Result Date: 1/21/2024  Interpreted By:  Real Mendieta, STUDY: XR FOOT LEFT 1-2 VIEWS; 1/21/2024 4:15 pm   INDICATION: Signs/Symptoms:osteomyelitis. Pain   COMPARISON: 12/07/2023   ACCESSION NUMBER(S): [id number]   ORDERING CLINICIAN: BAN GIFFORD   TECHNIQUE: Views:  AP, Lat, Oblique, left foot   FINDINGS: RESULT: There is no evidence for fracture or dislocation. 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 Mild hammertoe deformities. Joint spaces appear adequately maintained. Soft tissue ulceration of the posterior aspect of the heel however no evidence for bony erosion to suggest osteomyelitis.       No evidence for acute fracture or acute bony erosion to suggest osteomyelitis. Chronic changes as described.   Signed by: Real Mendieta 1/21/2024 7:15 PM Dictation workstation:   [id number]    CT chest abdomen pelvis wo IV contrast    Result Date: 1/21/2024  Interpreted By:  Maria Garcia, STUDY: CT CHEST ABDOMEN PELVIS WO CONTRAST;  1/20/2024 11:42 pm   INDICATION: Mental status change. Elevated white blood cell count with infectious workup.   COMPARISON: 08/20/2023   ACCESSION NUMBER(S): [id number]   ORDERING CLINICIAN: BAN GIFFORD   TECHNIQUE: CT of the chest, abdomen and pelvis was performed with no oral or intravenous contrast administered. Sagittal and coronal reformations were completed by the technologist at the acquisition scanner.   All CT examinations are performed with 1 or more of the following dose reduction techniques: Automated exposure control, adjustment of mA and/or kv according to patient's size, or use of iterative reconstruction techniques.   FINDINGS: Please note that the study is limited without intravenous contrast.   CHEST: Bilateral pleural effusions are present with right effusion moderately small in size and  with the left effusion moderately small in size as well. There is shift of the heart and mediastinum to the left of midline due to atelectasis of the left upper lobe. There is consolidation throughout left lower lobe with air bronchograms noted. On the right, there is compressive atelectasis seen posteriorly in the right lower lobe.   There is mild reactive mediastinal adenopathy seen at this time with mediastinal lymph nodes increased in size since prior study. Several of these mediastinal nodes are at the upper limits of normal measuring 8 mm in short axis diameter.   No pericardial effusion is present. The cardiac size is normal with mitral annulus calcification.   There has been prior bilateral mastectomy with reconstruction of the left breast with implant placement. Surgical clips are visible within the left axilla. Stent grafts are visible within the left upper extremity and within the left innominate, subclavian as well as axillary veins.   A peripherally calcified 1.8 cm nodule arises from the lower pole of left thyroid lobe.   ABDOMEN:   LIVER: Unremarkable.   BILE DUCTS: No intrahepatic or extrahepatic biliary ductal dilatation is seen.   GALLBLADDER: Cholelithiasis is observed with some calcification of the gallbladder wall demonstrated as well. No gallbladder wall thickening or pericholecystic fluid is evident.   PANCREAS: Unremarkable   SPLEEN: Unremarkable   ADRENAL GLANDS: Unremarkable   KIDNEYS AND URETERS: Kidneys are small in size with extensive renal artery calcification demonstrated.   PELVIS:   BLADDER: Amos catheter is present within the decompressed urinary bladder.   REPRODUCTIVE ORGANS: Calcification of the arcuate arteries within the uterus is seen. There is no uterine enlargement or adnexal mass.   BOWEL: A rectal balloon is seen. There is no abnormal distention of the intestinal tract.   VESSELS: There is atherosclerosis of the thoracoabdominal aorta and iliac arteries with calcification  in the walls of the celiac, splenic, hepatic, and mesenteric arteries.   PERITONEUM/RETROPERITONEUM/LYMPH NODES: There is a minimal amount of ascites seen about the liver and spleen with mild presacral edema noted.   ABDOMINAL WALL: There is anasarca involving the soft tissues of the abdomen and pelvis. Postoperative change from ventral hernia repair is seen.   BONES: Bilateral sacroiliitis is seen. There is lumbar dextroscoliosis. Chronic rotator cuff tear is present bilaterally with osteoarthritis of both shoulders, right greater than left.       Chest 1.  Moderately small bilateral pleural effusions with left upper lobe atelectasis and compressive atelectasis seen posteriorly in right lower lobe. A left lower lobe pneumonia is identified. There is extensive airspace consolidation within left lower lobe with air bronchograms observed. Mild mediastinal reactive adenopathy is present as well.   Abdomen-Pelvis 1.  Cholelithiasis without evidence of cholecystitis. 2. Small amount of ascites with mild presacral edema and anasarca within the soft tissues of the abdominal wall. 3. Renal atrophy with extensive vascular calcifications.     MACRO: None   Signed by: Maria Garcia 1/21/2024 8:30 AM Dictation workstation:   [id number]    CT head wo IV contrast    Result Date: 1/21/2024  Interpreted By:  Maria Garcia, STUDY: CT HEAD WO IV CONTRAST 1/20/2024 11:42 pm   INDICATION: Signs/Symptoms:mental status changes   COMPARISON: 12/11/2023   ACCESSION NUMBER(S): [id number]   ORDERING CLINICIAN: BAN GIFFORD   TECHNIQUE: Unenhanced axial images of the brain are completed.   All CT examinations are performed with 1 or more of the following dose reduction techniques: Automated exposure control, adjustment of mA and/or kv according to patient's size, or use of iterative reconstruction techniques.   FINDINGS: Helical unenhanced axial images of the brain demonstrate a mild to moderate degree of ventricular enlargement with  proportionate widening of the sulci and sylvian fissures. There is no midline shift, mass effect, extra-axial fluid collection, or acute intracranial hemorrhage. There is diminished density seen in the periventricular white matter indicating chronic microvascular ischemic disease. There is calcified plaque seen within the distal vertebral and internal carotid arteries bilaterally. No calvarial abnormality is seen.       Atrophy and chronic microvascular ischemic disease without acute intracranial process.   Signed by: Maria Garcia 1/21/2024 8:09 AM Dictation workstation:   [id number]    XR chest 1 view    Result Date: 1/20/2024  Interpreted By:  Brendon Perez, STUDY: XR CHEST 1 VIEW; 1/20/2024 5:03 pm   INDICATION: CLINICAL INFORMATION: Signs/Symptoms:Insertion right IJ central line, also left thoracentesis.   COMPARISON: 01/19/2024 at 1338 hours   ACCESSION NUMBER(S): [id number]   ORDERING CLINICIAN: BOZENA ANTONIO   TECHNIQUE: Portable chest one view.   FINDINGS: The cardiac size is indeterminate in view of the AP projection. There is no change in the right-sided dual port central venous catheter. There is a new right internal jugular venous catheter present with the tip at approximately same level as the dual port central venous catheter, overlying the mid to lower right atrium. There is no evidence for pneumothorax. Patient has a history of left thoracentesis without evidence for pneumothorax on the left. There is bilateral basilar alveolar infiltrate and effusions. Left effusion is decreased compared to the study from 1 day earlier.   Surgical clips are identified within left chest wall. Endovascular stent is identified in the distribution of the left subclavian artery.       1. Status post right-sided central venous catheter placement as described above with the tip overlying the mid to caudal aspect of the right atrium. There is no evidence for pneumothorax. 2. No evidence for left-sided pneumothorax after  left-sided thoracentesis. Decrease in the left effusion. 3. Bilateral basilar infiltrates and effusions are present. Follow-up to assure complete clearing is suggested.   MACRO: none   Signed by: Brendon Perez 1/20/2024 5:11 PM Dictation workstation:   [id number]    XR chest 1 view    Result Date: 1/19/2024  Interpreted By:  Real Mendieta, STUDY: XR CHEST 1 VIEW; 1/19/2024 1:38 pm   INDICATION: Signs/Symptoms:dyspnea.   COMPARISON: 12/26/2023   ACCESSION NUMBER(S): [id number]   ORDERING CLINICIAN: EMELY GOLDSMITH   TECHNIQUE: 1 view of the chest was performed.   FINDINGS: There may be a minimal right pleural effusion. Slight prominence of the interstitium otherwise the right lung is clear. Improved appearance of the left lung with improved aeration of the right upper lobe. There is opacification of the left lower lobe possibly due to large pleural effusion which is similar to the prior study. No pneumothorax. Right-sided dual lumen central line catheter with tip at the proximal atrium. The cardiomediastinal silhouette is within normal limits. Left-sided subclavian stents are again noted.       Improved aeration and appearance of the left lung superiorly however there is a persistent large left pleural effusion and opacification of the left lower lobe.   Signed by: Real Mendieta 1/19/2024 1:44 PM Dictation workstation:   [id number]     Assessment/Plan   Septic shock-on pressors-remains on pressors  Left-sided pleural effusion   Left lower lobe pneumonia-treated  Proteus urinary tract infection-treated  History of MRSA endocarditis  History of C. 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

## 2024-02-10 NOTE — PROGRESS NOTES
Ayanna Gross is a 73 y.o. female on day 22 of admission presenting with Septic shock (CMS/HCC).      Subjective   No acute events       Objective          Vitals 24HR  Heart Rate:  []   Temp:  [36 °C (96.8 °F)-36.8 °C (98.2 °F)]   Resp:  [14-30]   BP: (45-94)/(22-69)   Weight:  [99.1 kg (218 lb 7.6 oz)-99.3 kg (218 lb 14.7 oz)]   SpO2:  [86 %-100 %]         Intake/Output last 3 Shifts:    Intake/Output Summary (Last 24 hours) at 2/10/2024 1415  Last data filed at 2/10/2024 1300  Gross per 24 hour   Intake 157.62 ml   Output 1300 ml   Net -1142.38 ml       Physical Exam  Gen: NAD  HENT: atraumatic  Heart: RRR  Lungs: CTA  Abdomen: Soft  Ext; 1+ edema  Neuro: non focal  Psych : AAO x 3    Relevant Results               Assessment/Plan      73-year-old female with end-stage renal disease  Edema/fluid overload  Hypotension/shock    -Labs and volume status did not necessitate dialysis  -She is extremely hypotensive but asymptomatic  -Will address dialysis needs on Monday again , not anticipating dialysis until that        Ernesto Andrade MD

## 2024-02-10 NOTE — CARE PLAN
Problem: Pain  Goal: My pain/discomfort is manageable  Outcome: Progressing  Flowsheets (Taken 2/2/2024 1928)  Resident's pain/discomfort is manageable:   Include resident/family/caregiver in decisions related to pain management   Offer non-pharmacological pain management interventions   Administer pain medication prior to activities that may trigger pain   Identify and avoid pain triggers     Problem: Safety  Goal: Patient will be injury free during hospitalization  Outcome: Progressing  Goal: I will remain free of falls  Outcome: Progressing     Problem: Daily Care  Goal: Daily care needs are met  Outcome: Progressing  Flowsheets (Taken 2/2/2024 1928)  Daily care needs are met:   Assess and monitor ability to perform self care and identify potential discharge needs   Assess skin integrity/risk for skin breakdown   Encourage independent activity per ability   Provide mouth care   Include patient/family/caregiver in decisions related to daily care   Assist patient with activities of daily living as needed     Problem: Psychosocial Needs  Goal: Demonstrates ability to cope with hospitalization/illness  Outcome: Progressing  Flowsheets (Taken 2/1/2024 1837)  Demonstrates ability to cope with hospitalization/illness:   Encourage verbalization of feelings/concerns/expectations   Provide low-stimulation environment as needed   Assist resident to identify and practice own strengths and abilities   Encourage resident to set and complete small goals for self   Encourage participation in diversional activities   Reinforce positive adaptation of new coping behaviors   Include resident/family/caregiver in decisions related to psychosocial needs  Goal: Collaborate with me, my family, and caregiver to identify my specific goals  Outcome: Progressing     Problem: Discharge Barriers  Goal: My discharge needs are met  Outcome: Progressing  Flowsheets (Taken 2/2/2024 1928)  Resident's discharge needs are met:   Identify potential  discharge barriers on admission and throughout stay   Involve resident/family/caregiver in discharge planning process     Problem: Fall/Injury  Goal: Not fall by end of shift  Outcome: Progressing  Goal: Be free from injury by end of the shift  Outcome: Progressing  Goal: Verbalize understanding of personal risk factors for fall in the hospital  Outcome: Progressing  Goal: Verbalize understanding of risk factor reduction measures to prevent injury from fall in the home  Outcome: Progressing  Goal: Pace activities to prevent fatigue by end of the shift  Outcome: Progressing     Problem: Skin  Goal: Decreased wound size/increased tissue granulation at next dressing change  Outcome: Progressing  Flowsheets (Taken 2/10/2024 1755)  Decreased wound size/increased tissue granulation at next dressing change:   Promote sleep for wound healing   Protective dressings over bony prominences   Utilize specialty bed per algorithm  Goal: Participates in plan/prevention/treatment measures  Outcome: Progressing  Flowsheets (Taken 2/10/2024 1755)  Participates in plan/prevention/treatment measures: Elevate heels  Goal: Prevent/manage excess moisture  Outcome: Progressing  Flowsheets (Taken 2/10/2024 1755)  Prevent/manage excess moisture:   Moisturize dry skin   Cleanse incontinence/protect with barrier cream   Follow provider orders for dressing changes   Monitor for/manage infection if present  Goal: Prevent/minimize sheer/friction injuries  Outcome: Progressing  Flowsheets (Taken 2/10/2024 1755)  Prevent/minimize sheer/friction injuries:   Complete micro-shifts as needed if patient unable. Adjust patient position to relieve pressure points, not a full turn   HOB 30 degrees or less   Turn/reposition every 2 hours/use positioning/transfer devices   Use pull sheet   Utilize specialty bed per algorithm  Goal: Promote/optimize nutrition  Outcome: Progressing  Flowsheets (Taken 2/10/2024 1755)  Promote/optimize nutrition:   Offer  water/supplements/favorite foods   Consume > 50% meals/supplements   Monitor/record intake including meals  Goal: Promote skin healing  Outcome: Progressing  Flowsheets (Taken 2/10/2024 1755)  Promote skin healing:   Assess skin/pad under line(s)/device(s)   Ensure correct size (line/device) and apply per  instructions   Protective dressings over bony prominences   Rotate device position/do not position patient on device   Turn/reposition every 2 hours/use positioning/transfer devices     Problem: Pain  Goal: Takes deep breaths with improved pain control throughout the shift  Outcome: Progressing  Goal: Turns in bed with improved pain control throughout the shift  Outcome: Progressing  Goal: Walks with improved pain control throughout the shift  Outcome: Progressing  Goal: Performs ADL's with improved pain control throughout shift  Outcome: Progressing     Problem: Respiratory  Goal: Clear secretions with interventions this shift  Outcome: Progressing  Flowsheets (Taken 2/1/2024 1837)  Clear secretions with interventions this shift:   Encourage/provide pulmonary hygiene/secretion clearance   Med administration/monitoring of effect  Goal: Minimize anxiety/maximize coping throughout shift  Outcome: Progressing  Flowsheets (Taken 2/1/2024 1837)  Minimize anxiety/maximize coping throughout shift:   Med administration/monitoring of effect   Monitor pain/anxiety level  Goal: Minimal/no exertional discomfort or dyspnea this shift  Outcome: Progressing  Flowsheets (Taken 2/10/2024 1755)  Minimal/no exertional discomfort or dyspnea this shift: Positioning to promote ventilation/comfort  Goal: Patent airway maintained this shift  Outcome: Progressing  Goal: Tolerate pulmonary toileting this shift  Outcome: Progressing  Flowsheets (Taken 2/10/2024 1755)  Tolerate pulmonary toileting this shift: Positioning to promote ventilation/comfort  Goal: Verbalize decreased shortness of breath this shift  Outcome:  Progressing  Flowsheets (Taken 2/1/2024 1837)  Verbalize decreased shortness of breath this shift: Encourage/provide pulmonary hygiene/secretion clearance  Goal: Increase self care and/or family involvement in next 24 hours  Outcome: Progressing  Flowsheets (Taken 2/2/2024 1928)  Increase self care and/or family involvement in next 24 hours: Encourage activity/mobility  Goal: No signs of respiratory distress (eg. Use of accessory muscles. Peds grunting)  Outcome: Progressing     Problem: Diabetes  Goal: Maintain electrolyte levels within acceptable range throughout shift  Outcome: Progressing  Flowsheets (Taken 2/1/2024 1837)  Maintain electrolyte levels within acceptable range throughout shift:   Monitor urine output   Med administration/monitoring of effect  Goal: Maintain glucose levels >70mg/dl to <250mg/dl throughout shift  Outcome: Progressing  Flowsheets (Taken 2/10/2024 1755)  Maintain glucose levels >70mg/dl to <250mg/dl throughout shift: Med administration/monitoring of effect  Goal: No changes in neurological exam by end of shift  Outcome: Progressing  Flowsheets (Taken 2/10/2024 1755)  No changes in neurological exam by end of shift: Complete frequent neurological assessments  Goal: Learn about and adhere to nutrition recommendations by end of shift  Outcome: Progressing  Flowsheets (Taken 2/10/2024 1755)  Learn about and adhere to nutrition recommendations by end of shift: Ensure/encourage compliance with appropriate diet  Goal: Vital signs within normal range for age by end of shift  Outcome: Progressing  Flowsheets (Taken 2/10/2024 1755)  Vital signs within normal range for age by end of shift: Med administration/monitoring of effect  Goal: Increase self care and/or family involovement by end of shift  Outcome: Progressing  Goal: Receive DSME education by end of shift  Outcome: Progressing     Problem: Discharge Planning  Goal: Discharge to home or other facility with appropriate resources  Outcome:  Progressing  Flowsheets (Taken 2/1/2024 1837)  Discharge to home or other facility with appropriate resources:   Identify barriers to discharge with patient and caregiver   Arrange for needed discharge resources and transportation as appropriate   Identify discharge learning needs (meds, wound care, etc)   Arrange for interpreters to assist at discharge as needed   Refer to discharge planning if patient needs post-hospital services based on physician order or complex needs related to functional status, cognitive ability or social support system     Problem: Chronic Conditions and Co-morbidities  Goal: Patient's chronic conditions and co-morbidity symptoms are monitored and maintained or improved  Outcome: Progressing  Flowsheets (Taken 2/1/2024 1837)  Care Plan - Patient's Chronic Conditions and Co-Morbidity Symptoms are Monitored and Maintained or Improved:   Monitor and assess patient's chronic conditions and comorbid symptoms for stability, deterioration, or improvement   Collaborate with multidisciplinary team to address chronic and comorbid conditions and prevent exacerbation or deterioration   Update acute care plan with appropriate goals if chronic or comorbid symptoms are exacerbated and prevent overall improvement and discharge   The patient's goals for the shift include Visit with family.    The clinical goals for the shift include Wean levophed as tolerated.    Over the shift, the patient's BP remained labile on small-dose levophed, titrating based on mentation which has been within normal limits.

## 2024-02-10 NOTE — PROGRESS NOTES
Ayanna Gross is a 73 y.o. female on day 22 of admission presenting with Septic shock (CMS/[facility]).    Critical Care Medicine Progress Note    Admitted on:     1/19/2024  Length of Stay: 22 day(s)     Interval History     Still on levo 0.05   Full awake ate breakfast no issues   Bad smell from heel ulcer on the left foot   no other complaints      Objective   Objective     Vitals:    02/10/24 0430   Weight: 99.1 kg (218 lb 7.6 oz)   Body mass index is 39.96 kg/m².        2/10/2024     9:00 AM 2/10/2024     9:30 AM 2/10/2024    10:00 AM 2/10/2024    10:30 AM 2/10/2024    11:00 AM 2/10/2024    11:30 AM 2/10/2024    12:00 PM   Vitals   Systolic 49 61 57 45 65 61 69   Diastolic 25 28 24 33 39 46 41   Heart Rate 103 100 97 97 95 96 97   Temp     36 °C (96.8 °F)     Resp 20 22 19 16 16 22 19        Vent settings:  FiO2 (%):  [32 %] 32 %    Intake/Output Summary (Last 24 hours) at 2/10/2024 1222  Last data filed at 2/10/2024 1200  Gross per 24 hour   Intake 253.44 ml   Output 1300 ml   Net -1046.56 ml     Physical exam:  -----------------  Awake and oriented to place and person  Cardiovascular:      Rate and Rhythm: Normal rate. Rhythm irregular.   Pulmonary:      Effort: Pulmonary effort is normal.   Abdominal:      General: Abdomen is flat.   Musculoskeletal:         General: Deformity present. Normal range of motion.      Cervical back: Normal range of motion.      Comments: Abscence of left hand first finger   Skin:     General: Skin is warm.      Capillary Refill: Capillary refill takes less than 2 seconds.      Comments: Skin tear healing to L hand  Wound to bilateral feet and sacrum     Medications     Scheduled Medications:   apixaban, 2.5 mg, oral, BID  doxycylcine, 100 mg, oral, Daily  epoetin di or biosimilar, 10,000 Units, intravenous, Every Mon/Wed/Fri  escitalopram, 10 mg, oral, Nightly  fludrocortisone, 0.1 mg, oral, TID  gabapentin, 100 mg, oral, TID  heparin, 2,000 Units, intra-catheter, After  Dialysis  heparin, 2,000 Units, intra-catheter, After Dialysis  heparin, 2,000 Units, intra-catheter, After Dialysis  heparin, 2,000 Units, intra-catheter, After Dialysis  insulin lispro, 0-10 Units, subcutaneous, TID with meals  ipratropium-albuteroL, 3 mL, nebulization, q6h while awake  lidocaine, 5 mL, infiltration, Once  midodrine, 15 mg, oral, TID with meals  nystatin, , Topical, BID  nystatin, , Topical, BID  pantoprazole, 40 mg, oral, Daily   Or  pantoprazole, 40 mg, intravenous, Daily  perflutren lipid microspheres, 0.5-10 mL of dilution, intravenous, Once in imaging  perflutren protein A microsphere, 0.5 mL, intravenous, Once in imaging  psyllium, 1 packet, oral, BID  sodium chloride, 3 mL, nebulization, TID  sulfur hexafluoride microsphr, 2 mL, intravenous, Once in imaging  sulfur hexafluoride microsphr, 2 mL, intravenous, Once in imaging  vancomycin, 125 mg, oral, BID  zinc oxide, 1 Application, Topical, BID       Continuous Medications:   norepinephrine, 0.01-3 mcg/kg/min, Last Rate: 0.03 mcg/kg/min (02/10/24 1200)       PRN Medications:     Labs     Results from last 72 hours   Lab Units 02/10/24  0452 02/08/24  1858   GLUCOSE mg/dL 230* 178*   SODIUM mmol/L 132* 130*   POTASSIUM mmol/L 3.4 3.5   CHLORIDE mmol/L 98 98   CO2 mmol/L 22* 22*   BUN mg/dL 12 17   CREATININE mg/dL 2.00* 2.70*   EGFR mL/min/1.73m*2 26* 18*   CALCIUM mg/dL 8.6 8.0*                   Results from last 72 hours   Lab Units 02/10/24  0452 02/09/24  0537   WBC AUTO x10*3/uL 10.5 14.5*   NRBC AUTO /100 WBCs 0.0 0.0   RBC AUTO x10*6/uL 2.56* 2.79*   HEMOGLOBIN g/dL 8.0* 8.6*   HEMATOCRIT % 25.5* 27.6*   MCV fL 100 99   MCH pg 31.3 30.8   MCHC g/dL 31.4* 31.2*   RDW % 23.1* 22.7*   PLATELETS AUTO x10*3/uL 199 239         Lab Results   Component Value Date    BLOODCULT No growth at 4 days -  FINAL REPORT 01/19/2024    BLOODCULT No growth at 4 days -  FINAL REPORT 01/19/2024    GRAMSTAIN (A) 12/15/2023     Gram stain indicates specimen  contains significant salivary contamination.     Lab Results   Component Value Date    URINECULTURE >100,000 Proteus mirabilis (A) 01/19/2024       Imaging and Diagnostic Studies     Lab/Radiology/Diagnostic Review:  Results for orders placed or performed during the hospital encounter of 01/19/24 (from the past 24 hour(s))   POCT GLUCOSE   Result Value Ref Range    POCT Glucose 156 (H) 74 - 99 mg/dL   POCT GLUCOSE   Result Value Ref Range    POCT Glucose 217 (H) 74 - 99 mg/dL   CBC   Result Value Ref Range    WBC 10.5 4.4 - 11.3 x10*3/uL    nRBC 0.0 0.0 - 0.0 /100 WBCs    RBC 2.56 (L) 4.00 - 5.20 x10*6/uL    Hemoglobin 8.0 (L) 12.0 - 16.0 g/dL    Hematocrit 25.5 (L) 36.0 - 46.0 %     80 - 100 fL    MCH 31.3 26.0 - 34.0 pg    MCHC 31.4 (L) 32.0 - 36.0 g/dL    RDW 23.1 (H) 11.5 - 14.5 %    Platelets 199 150 - 450 x10*3/uL   Basic metabolic panel   Result Value Ref Range    Glucose 230 (H) 65 - 99 mg/dL    Sodium 132 (L) 133 - 145 mmol/L    Potassium 3.4 3.4 - 5.1 mmol/L    Chloride 98 97 - 107 mmol/L    Bicarbonate 22 (L) 24 - 31 mmol/L    Urea Nitrogen 12 8 - 25 mg/dL    Creatinine 2.00 (H) 0.40 - 1.60 mg/dL    eGFR 26 (L) >60 mL/min/1.73m*2    Calcium 8.6 8.5 - 10.4 mg/dL    Anion Gap 12 <=19 mmol/L   POCT GLUCOSE   Result Value Ref Range    POCT Glucose 191 (H) 74 - 99 mg/dL   POCT GLUCOSE   Result Value Ref Range    POCT Glucose 178 (H) 74 - 99 mg/dL     Upper extremity venous duplex bilateral    Result Date: 1/25/2024           [facility] [id number] [place] [address]            Phone [phone]  Vascular Lab Report  [facility] US UPPER EXTREMITY VENOUS DUPLEX BILATERAL Patient Name:      BASIA VEGA      Roxana Physician:  [id number] Mini Busby MD, RPVI Study Date:        1/25/2024           Ordering Provider:  [id number] ERI BERMAN MRN/PID:           [id number]            Fellow: Accession#:        [id number]         Technologist:       Dara Parr RVT Date of Birth/Age: 1950 / 73 years Technologist 2: Gender:            F                   Encounter#:         [id number] Admission Status:  Inpatient           Location Performed: [facility]  Diagnosis/ICD: Right arm swelling-M79.89; Left arm swelling-M79.89 CPT Codes:     36199 Peripheral venous duplex scan for DVT complete  CONCLUSIONS:  Right Upper Venous: No evidence of acute deep vein thrombus visualized in the right upper extremity. Internal jugular vein was visualized in segments due to IV lines and bandages. Left Upper Venous: No evidence of acute deep vein thrombus visualized in the left upper extremity. Subclavian stent is noted and appears patent. There is a known occluded dialysis access noted.  Additional Findings: Technically difficult exam due to IV lines, bandages, and patient's positioning.  Imaging & Doppler Findings:  Right               Compressible Thrombus        Flow Internal Jugular        Yes        None   Spontaneous/Phasic Subclavian              Yes        None Subclavian Proximal     Yes        None   Spontaneous/Phasic Subclavian Mid          Yes        None Subclavian Distal       Yes        None   Spontaneous/Phasic Axillary                Yes        None       Pulsatile Brachial                Yes        None Cephalic                Yes        None Basilic                 Yes        None  Left                Compress Thrombus        Flow Internal Jugular      Yes      None       Pulsatile Subclavian            Yes      None Subclavian Proximal   Yes      None       Pulsatile Subclavian Mid        Yes      None       Pulsatile Subclavian Distal     Yes      None       Pulsatile Axillary              Yes      None   Spontaneous/Phasic Brachial              Yes      None Cephalic              Yes      None Basilic               Yes      None  [id number] Mini Busby MD, RPMOOKIE Electronically signed by [id number] ALICE Kelly MD on  1/25/2024 at 4:06:13 PM  ** Final **     ECG 12 lead    Result Date: 1/24/2024  Sinus tachycardia  with 1st degree AV block Right bundle branch block Possible Lateral infarct , age undetermined Abnormal ECG Confirmed by Yesika Nj ([id number]) on 1/24/2024 6:54:45 AM    XR tibia fibula right 2 views    Result Date: 1/22/2024  Interpreted By:  Real Mendieta, STUDY: XR TIBIA FIBULA RIGHT 2 VIEWS; 1/22/2024 2:15 pm   INDICATION: Signs/Symptoms:evaluate source of infection, osteomyelitis;   COMPARISON: None available.   ACCESSION NUMBER(S): [id number]   ORDERING CLINICIAN: BAN GIFFORD   TECHNIQUE: Views: AP and Lateral of the right tibia and fibula   FINDINGS: RESULT: There is no evidence for fracture or dislocation. Tricompartmental degenerative changes of the right knee. The tibia and fibula appear intact without bony erosion or evidence for osteomyelitis. No other bony or soft tissue abnormality is identified. No subcutaneous gas is noted.       No evidence for acute osseous abnormality. No bony erosion to suggest osteomyelitis.   Signed by: Real Mendieta 1/22/2024 3:15 PM Dictation workstation:   [id number]    XR ankle right 3+ views    Result Date: 1/22/2024  Interpreted By:  Real Mendieta, STUDY: XR ANKLE RIGHT 3+ VIEWS; 1/22/2024 2:15 pm   INDICATION: Signs/Symptoms:Evuluate source of infection, osteomyelitis;   COMPARISON: None available.   ACCESSION NUMBER(S): [id number]   ORDERING CLINICIAN: BAN GIFFORD   TECHNIQUE: Views: AP, Lateral, Oblique, right ankle   FINDINGS: RESULT: There is no evidence for fracture or dislocation. The ankle mortise is intact. Joint spaces appear adequately maintained. No bony erosion to suggest osteomyelitis. No bone lesion or soft tissue abnormality is identified. No subcutaneous gas is seen.       No evidence for acute osseous abnormality. No bony erosion to suggest osteomyelitis.   Signed by: Real Mendieta 1/22/2024 3:14 PM Dictation workstation:    [id number]    Transthoracic Echo (TTE) Complete    Result Date: 1/22/2024           [facility] [id number] [place], [place] [id number]            Phone [phone] TRANSTHORACIC ECHOCARDIOGRAM REPORT  Patient Name:      BASIA GUAN VEGA      Isaac Physician:   [id number] Fausto Rowe DO Study Date:        1/22/2024           Ordering Provider:   [id number] ERI BERMAN MRN/PID:           [id number]            Fellow: Accession#:        [id number]        Nurse: Date of Birth/Age: 1950 / 73 years Sonographer:         Tabatha Page RDCS Gender:            F                   Additional Staff: Height:            157.00 cm           Admit Date: Weight:            75.00 kg            Admission Status:    Inpatient - Routine BSA:               1.76 m2             Department Location: [facility] Blood Pressure: 88 /49 mmHg Study Type:    TRANSTHORACIC ECHO (TTE) COMPLETE Diagnosis/ICD: Chronic combined systolic (congestive) and diastolic (congestive)                heart failure (CHF)-I50.42; Sepsis, unspecified organism-A41.9 Indication:    heart failure,septic shock CPT Codes:     Echo Complete w Full Doppler-36863 Patient History: BMI:               Obese >30 Pertinent History: Sepsis, PVD, A-Fib, CVA, Cancer and HTN. breast                    prosthesis/ca, ESRD,anemia,septic shock,heart failure. Study Detail: The following Echo studies were performed: 2D, M-Mode, Doppler and               color flow. Technically challenging study due to prosthesis,               prominent lung artifact and body habitus.  PHYSICIAN INTERPRETATION: Left Ventricle: Left ventricular systolic function is normal, with an estimated ejection fraction of 70%. There are no regional wall motion abnormalities. The left ventricular cavity size is normal. Left ventricular diastolic filling was indeterminate. Left Atrium: The left atrium is moderately dilated. Right  Ventricle: The right ventricle is normal in size. There is normal right ventricular global systolic function. Right Atrium: The right atrium is normal in size. Aortic Valve: The aortic valve is trileaflet. There is no evidence of aortic valve regurgitation. The peak instantaneous gradient of the aortic valve is 2.8 mmHg. Mitral Valve: The mitral valve is normal in structure. There is no evidence of mitral valve regurgitation. Tricuspid Valve: The tricuspid valve is structurally normal. There is trace tricuspid regurgitation. Pulmonic Valve: The pulmonic valve is not well visualized. The pulmonic valve regurgitation was not well visualized. Pericardium: There is no pericardial effusion noted. Aorta: The aortic root is normal.  CONCLUSIONS:  1. Left ventricular systolic function is normal with a 70% estimated ejection fraction.  2. The left atrium is moderately dilated.  3. Left ventricular diastolic filling indeterminate. QUANTITATIVE DATA SUMMARY: 2D MEASUREMENTS:                          Normal Ranges: LAs:           4.50 cm   (2.7-4.0cm) IVSd:          0.61 cm   (0.6-1.1cm) LVPWd:         0.88 cm   (0.6-1.1cm) LVIDd:         3.66 cm   (3.9-5.9cm) LV Mass Index: 41.9 g/m2 LV SYSTOLIC FUNCTION BY 2D PLANIMETRY (MOD):                     Normal Ranges: EF-A4C View: 68.3 % (>=55%) LV DIASTOLIC FUNCTION:                     Normal Ranges: MV Peak E: 1.19 m/s (0.7-1.2 m/s) MV Peak A: 0.90 m/s (0.42-0.7 m/s) E/A Ratio: 1.32     (1.0-2.2) MITRAL VALVE:                 Normal Ranges: MV DT: 192 msec (150-240msec) AORTIC VALVE:                         Normal Ranges: AoV Vmax:      0.84 m/s (<=1.7m/s) AoV Peak P.8 mmHg (<20mmHg) LVOT Max Shimon:  0.47 m/s (<=1.1m/s) LVOT Diameter: 1.90 cm  (1.8-2.4cm) AoV Area,Vmax: 1.57 cm2 (2.5-4.5cm2) TRICUSPID VALVE/RVSP:                   Normal Ranges: IVC Diam: 1.05 cm  [id number] Fausto Rowe DO Electronically signed on 2024 at 2:53:04 PM  ** Final **     XR foot left 3+  views    Result Date: 1/21/2024  Interpreted By:  Real Mendieta, STUDY: XR FOOT LEFT 1-2 VIEWS; 1/21/2024 4:15 pm   INDICATION: Signs/Symptoms:osteomyelitis. Pain   COMPARISON: 12/07/2023   ACCESSION NUMBER(S): [id number]   ORDERING CLINICIAN: BAN GIFFORD   TECHNIQUE: Views:  AP, Lat, Oblique, left foot   FINDINGS: RESULT: There is no evidence for fracture or dislocation. 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 Mild hammertoe deformities. Joint spaces appear adequately maintained. Soft tissue ulceration of the posterior aspect of the heel however no evidence for bony erosion to suggest osteomyelitis.       No evidence for acute fracture or acute bony erosion to suggest osteomyelitis. Chronic changes as described.   Signed by: Real Mendieta 1/21/2024 7:15 PM Dictation workstation:   [id number]    CT chest abdomen pelvis wo IV contrast    Result Date: 1/21/2024  Interpreted By:  Maria Garcia, STUDY: CT CHEST ABDOMEN PELVIS WO CONTRAST;  1/20/2024 11:42 pm   INDICATION: Mental status change. Elevated white blood cell count with infectious workup.   COMPARISON: 08/20/2023   ACCESSION NUMBER(S): [id number]   ORDERING CLINICIAN: BAN GIFFORD   TECHNIQUE: CT of the chest, abdomen and pelvis was performed with no oral or intravenous contrast administered. Sagittal and coronal reformations were completed by the technologist at the acquisition scanner.   All CT examinations are performed with 1 or more of the following dose reduction techniques: Automated exposure control, adjustment of mA and/or kv according to patient's size, or use of iterative reconstruction techniques.   FINDINGS: Please note that the study is limited without intravenous contrast.   CHEST: Bilateral pleural effusions are present with right effusion moderately small in size and with the left effusion moderately small in size as well. There is shift of the heart and mediastinum to the left of midline due to  atelectasis of the left upper lobe. There is consolidation throughout left lower lobe with air bronchograms noted. On the right, there is compressive atelectasis seen posteriorly in the right lower lobe.   There is mild reactive mediastinal adenopathy seen at this time with mediastinal lymph nodes increased in size since prior study. Several of these mediastinal nodes are at the upper limits of normal measuring 8 mm in short axis diameter.   No pericardial effusion is present. The cardiac size is normal with mitral annulus calcification.   There has been prior bilateral mastectomy with reconstruction of the left breast with implant placement. Surgical clips are visible within the left axilla. Stent grafts are visible within the left upper extremity and within the left innominate, subclavian as well as axillary veins.   A peripherally calcified 1.8 cm nodule arises from the lower pole of left thyroid lobe.   ABDOMEN:   LIVER: Unremarkable.   BILE DUCTS: No intrahepatic or extrahepatic biliary ductal dilatation is seen.   GALLBLADDER: Cholelithiasis is observed with some calcification of the gallbladder wall demonstrated as well. No gallbladder wall thickening or pericholecystic fluid is evident.   PANCREAS: Unremarkable   SPLEEN: Unremarkable   ADRENAL GLANDS: Unremarkable   KIDNEYS AND URETERS: Kidneys are small in size with extensive renal artery calcification demonstrated.   PELVIS:   BLADDER: Amos catheter is present within the decompressed urinary bladder.   REPRODUCTIVE ORGANS: Calcification of the arcuate arteries within the uterus is seen. There is no uterine enlargement or adnexal mass.   BOWEL: A rectal balloon is seen. There is no abnormal distention of the intestinal tract.   VESSELS: There is atherosclerosis of the thoracoabdominal aorta and iliac arteries with calcification in the walls of the celiac, splenic, hepatic, and mesenteric arteries.   PERITONEUM/RETROPERITONEUM/LYMPH NODES: There is a  minimal amount of ascites seen about the liver and spleen with mild presacral edema noted.   ABDOMINAL WALL: There is anasarca involving the soft tissues of the abdomen and pelvis. Postoperative change from ventral hernia repair is seen.   BONES: Bilateral sacroiliitis is seen. There is lumbar dextroscoliosis. Chronic rotator cuff tear is present bilaterally with osteoarthritis of both shoulders, right greater than left.       Chest 1.  Moderately small bilateral pleural effusions with left upper lobe atelectasis and compressive atelectasis seen posteriorly in right lower lobe. A left lower lobe pneumonia is identified. There is extensive airspace consolidation within left lower lobe with air bronchograms observed. Mild mediastinal reactive adenopathy is present as well.   Abdomen-Pelvis 1.  Cholelithiasis without evidence of cholecystitis. 2. Small amount of ascites with mild presacral edema and anasarca within the soft tissues of the abdominal wall. 3. Renal atrophy with extensive vascular calcifications.     MACRO: None   Signed by: Maria Garcia 1/21/2024 8:30 AM Dictation workstation:   [id number]    CT head wo IV contrast    Result Date: 1/21/2024  Interpreted By:  Maria Garcia, STUDY: CT HEAD WO IV CONTRAST 1/20/2024 11:42 pm   INDICATION: Signs/Symptoms:mental status changes   COMPARISON: 12/11/2023   ACCESSION NUMBER(S): [id number]   ORDERING CLINICIAN: BAN GIFFORD   TECHNIQUE: Unenhanced axial images of the brain are completed.   All CT examinations are performed with 1 or more of the following dose reduction techniques: Automated exposure control, adjustment of mA and/or kv according to patient's size, or use of iterative reconstruction techniques.   FINDINGS: Helical unenhanced axial images of the brain demonstrate a mild to moderate degree of ventricular enlargement with proportionate widening of the sulci and sylvian fissures. There is no midline shift, mass effect, extra-axial fluid collection, or  acute intracranial hemorrhage. There is diminished density seen in the periventricular white matter indicating chronic microvascular ischemic disease. There is calcified plaque seen within the distal vertebral and internal carotid arteries bilaterally. No calvarial abnormality is seen.       Atrophy and chronic microvascular ischemic disease without acute intracranial process.   Signed by: Maria Garcia 1/21/2024 8:09 AM Dictation workstation:   [id number]    XR chest 1 view    Result Date: 1/20/2024  Interpreted By:  Brendon Perez, STUDY: XR CHEST 1 VIEW; 1/20/2024 5:03 pm   INDICATION: CLINICAL INFORMATION: Signs/Symptoms:Insertion right IJ central line, also left thoracentesis.   COMPARISON: 01/19/2024 at 1338 hours   ACCESSION NUMBER(S): [id number]   ORDERING CLINICIAN: BOZENA ANTONIO   TECHNIQUE: Portable chest one view.   FINDINGS: The cardiac size is indeterminate in view of the AP projection. There is no change in the right-sided dual port central venous catheter. There is a new right internal jugular venous catheter present with the tip at approximately same level as the dual port central venous catheter, overlying the mid to lower right atrium. There is no evidence for pneumothorax. Patient has a history of left thoracentesis without evidence for pneumothorax on the left. There is bilateral basilar alveolar infiltrate and effusions. Left effusion is decreased compared to the study from 1 day earlier.   Surgical clips are identified within left chest wall. Endovascular stent is identified in the distribution of the left subclavian artery.       1. Status post right-sided central venous catheter placement as described above with the tip overlying the mid to caudal aspect of the right atrium. There is no evidence for pneumothorax. 2. No evidence for left-sided pneumothorax after left-sided thoracentesis. Decrease in the left effusion. 3. Bilateral basilar infiltrates and effusions are present. Follow-up to  assure complete clearing is suggested.   MACRO: none   Signed by: Brendon Perez 1/20/2024 5:11 PM Dictation workstation:   [id number]    XR chest 1 view    Result Date: 1/19/2024  Interpreted By:  Real Mendieta, STUDY: XR CHEST 1 VIEW; 1/19/2024 1:38 pm   INDICATION: Signs/Symptoms:dyspnea.   COMPARISON: 12/26/2023   ACCESSION NUMBER(S): [id number]   ORDERING CLINICIAN: EMELY GOLDSMITH   TECHNIQUE: 1 view of the chest was performed.   FINDINGS: There may be a minimal right pleural effusion. Slight prominence of the interstitium otherwise the right lung is clear. Improved appearance of the left lung with improved aeration of the right upper lobe. There is opacification of the left lower lobe possibly due to large pleural effusion which is similar to the prior study. No pneumothorax. Right-sided dual lumen central line catheter with tip at the proximal atrium. The cardiomediastinal silhouette is within normal limits. Left-sided subclavian stents are again noted.       Improved aeration and appearance of the left lung superiorly however there is a persistent large left pleural effusion and opacification of the left lower lobe.   Signed by: Real Mendieta 1/19/2024 1:44 PM Dictation workstation:   [id number]         Assessment / Plan       PROBLEM LIST:  - End stage heart failure with preserved EF - on vasopressors  - End stage renal disease on HD  - Cardiogenic shock - will continue to wean down levo her blood pressure measured 1 does not reflect of the actual blood pressure as the patient is fully awake despite readings of systolic in the 40s and 50s arterial line was also inaccurate due to vasculopathy  - Acute metabolic encephalopathy  - Failure to wean from vasopressors  - Goals of care discussion    MANAGEMENT PLAN:  - Start weaning levo and assess mental status  - Received HD yesterday  - Check lactate bid  -Already on Fluorinef tid  - Palliative care consult  - Cardiology consultation - patient's  daughter declined a RHC  - Peer to peer done with insurance, NOT approved  - Family will update us with goals of care  - Cont Doxy and Vanc PO. Appreciate ID consult.  - Routine ICU care  - PT/OT  Overall prognosis poor due to chronic medical problems including end-stage renal disease heart failure patient is bedridden with decubitus ulcers  I have personally interviewed and examined the patient.  I have personally verified elements of the exam listed above. Interval changes or irregularities are as noted.  I have personally and independently reviewed laboratory, radiographic and procedural data.  I have personally reviewed the problem list above and concur. Changes, if any, are noted.  I have personally reviewed the plan list above and concur. Changes, if any, are noted.    The patient has high probability of compromise including but not limited to organ system failure, mechanical respiratory and circulatory support, cardiac arrest and death. I have discussed this in detail with the family / caregivers.    I have personally spent  45minutes of face to face critical care time (not including billable procedures billed separately) in taking care of the patient.  I have personally answered all questions to the satisfaction of the patient and / or family members to their satisfaction.        Tarek R Gharibeh, MD

## 2024-02-10 NOTE — NURSING NOTE
Patients blood pressure has remained relatively unchanged throughout this shift thus far. Levophed is being titrated to mentation and she has remained alert/oriented X 4 thus far. Will continue to monitor closely. Fecal management systems is being irrigated every 4 hours to help prevent leaking around tube. Stool is very soft and no longer liquid. Will have dayshift team decide if the FMS is still warranted. Call light within reach.

## 2024-02-10 NOTE — CARE PLAN
The patient's goals for the shift include to get some good sleep tonight    The clinical goals for the shift include improved hemodynamics, titrate levophed to mentation      Problem: Pain  Goal: My pain/discomfort is manageable  Outcome: Progressing     Problem: Safety  Goal: Patient will be injury free during hospitalization  Outcome: Progressing  Goal: I will remain free of falls  Outcome: Progressing     Problem: Daily Care  Goal: Daily care needs are met  Outcome: Progressing     Problem: Psychosocial Needs  Goal: Demonstrates ability to cope with hospitalization/illness  Outcome: Progressing  Goal: Collaborate with me, my family, and caregiver to identify my specific goals  Outcome: Progressing     Problem: Discharge Barriers  Goal: My discharge needs are met  Outcome: Progressing     Problem: Fall/Injury  Goal: Not fall by end of shift  Outcome: Progressing  Goal: Be free from injury by end of the shift  Outcome: Progressing  Goal: Verbalize understanding of personal risk factors for fall in the hospital  Outcome: Progressing  Goal: Verbalize understanding of risk factor reduction measures to prevent injury from fall in the home  Outcome: Progressing  Goal: Pace activities to prevent fatigue by end of the shift  Outcome: Progressing     Problem: Skin  Goal: Decreased wound size/increased tissue granulation at next dressing change  Outcome: Progressing  Flowsheets (Taken 2/9/2024 2350)  Decreased wound size/increased tissue granulation at next dressing change:   Promote sleep for wound healing   Protective dressings over bony prominences  Goal: Participates in plan/prevention/treatment measures  Outcome: Progressing  Flowsheets (Taken 2/9/2024 2350)  Participates in plan/prevention/treatment measures: Elevate heels  Goal: Prevent/manage excess moisture  Outcome: Progressing  Flowsheets (Taken 2/9/2024 2350)  Prevent/manage excess moisture:   Cleanse incontinence/protect with barrier cream   Moisturize dry  skin   Use wicking fabric (obtain order)   Monitor for/manage infection if present   Follow provider orders for dressing changes  Goal: Prevent/minimize sheer/friction injuries  Outcome: Progressing  Flowsheets (Taken 2/9/2024 2350)  Prevent/minimize sheer/friction injuries:   Use pull sheet   HOB 30 degrees or less   Turn/reposition every 2 hours/use positioning/transfer devices  Goal: Promote/optimize nutrition  Outcome: Progressing  Flowsheets (Taken 2/9/2024 2350)  Promote/optimize nutrition:   Assist with feeding   Monitor/record intake including meals   Consume > 50% meals/supplements   Offer water/supplements/favorite foods  Goal: Promote skin healing  Outcome: Progressing  Flowsheets (Taken 2/9/2024 2350)  Promote skin healing:   Assess skin/pad under line(s)/device(s)   Protective dressings over bony prominences   Turn/reposition every 2 hours/use positioning/transfer devices     Problem: Pain  Goal: Takes deep breaths with improved pain control throughout the shift  Outcome: Progressing  Goal: Turns in bed with improved pain control throughout the shift  Outcome: Progressing  Goal: Walks with improved pain control throughout the shift  Outcome: Progressing  Goal: Performs ADL's with improved pain control throughout shift  Outcome: Progressing  Goal: Participates in PT with improved pain control throughout the shift  Outcome: Progressing     Problem: Respiratory  Goal: Clear secretions with interventions this shift  Outcome: Progressing  Goal: Minimize anxiety/maximize coping throughout shift  Outcome: Progressing  Goal: Minimal/no exertional discomfort or dyspnea this shift  Outcome: Progressing  Goal: Patent airway maintained this shift  Outcome: Progressing  Goal: Tolerate pulmonary toileting this shift  Outcome: Progressing  Goal: Verbalize decreased shortness of breath this shift  Outcome: Progressing  Goal: Increase self care and/or family involvement in next 24 hours  Outcome: Progressing  Goal: No  signs of respiratory distress (eg. Use of accessory muscles. Peds grunting)  Outcome: Progressing  Goal: Tolerate mechanical ventilation evidenced by VS/agitation level this shift  Outcome: Progressing     Problem: Diabetes  Goal: Maintain electrolyte levels within acceptable range throughout shift  Outcome: Progressing  Goal: Maintain glucose levels >70mg/dl to <250mg/dl throughout shift  Outcome: Progressing  Goal: No changes in neurological exam by end of shift  Outcome: Progressing  Goal: Learn about and adhere to nutrition recommendations by end of shift  Outcome: Progressing  Goal: Vital signs within normal range for age by end of shift  Outcome: Progressing  Goal: Increase self care and/or family involovement by end of shift  Outcome: Progressing  Goal: Receive DSME education by end of shift  Outcome: Progressing     Problem: Discharge Planning  Goal: Discharge to home or other facility with appropriate resources  Outcome: Progressing     Problem: Chronic Conditions and Co-morbidities  Goal: Patient's chronic conditions and co-morbidity symptoms are monitored and maintained or improved  Outcome: Progressing

## 2024-02-10 NOTE — CARE PLAN
Problem: Respiratory  Goal: Clear secretions with interventions this shift  Outcome: Progressing  Goal: Minimize anxiety/maximize coping throughout shift  Outcome: Progressing  Goal: Minimal/no exertional discomfort or dyspnea this shift  Outcome: Progressing  Goal: Patent airway maintained this shift  Outcome: Progressing  Goal: Tolerate pulmonary toileting this shift  Outcome: Progressing  Goal: Verbalize decreased shortness of breath this shift  Outcome: Progressing  Goal: Increase self care and/or family involvement in next 24 hours  Outcome: Progressing  Goal: No signs of respiratory distress (eg. Use of accessory muscles. Peds grunting)  Outcome: Progressing  Goal: Tolerate mechanical ventilation evidenced by VS/agitation level this shift  Outcome: Progressing

## 2024-02-11 NOTE — PROGRESS NOTES
Ayanna Gross is a 73 y.o. female on day 23 of admission presenting with Septic shock (CMS/[facility]).    Critical Care Medicine Progress Note    Admitted on:     1/19/2024  Length of Stay: 23 day(s)     Interval History     Still on levo 0.05   Full awake ate breakfast no issues   Bad smell from heel ulcer on the left foot   no other complaints      Objective   Objective     Vitals:    02/11/24 0600   Weight: 89.6 kg (197 lb 8.5 oz)   Body mass index is 36.13 kg/m².        2/11/2024     8:00 AM 2/11/2024     8:30 AM 2/11/2024     9:00 AM 2/11/2024     9:30 AM 2/11/2024    10:00 AM 2/11/2024    10:30 AM 2/11/2024    11:00 AM   Vitals   Systolic 55 69 92 59 52 71 60   Diastolic 28 40 44 39 29 57 38   Heart Rate 97 95 98 95 92 94 90   Resp 21 19 17 21 19 32 20        Vent settings:       Intake/Output Summary (Last 24 hours) at 2/11/2024 1129  Last data filed at 2/11/2024 1100  Gross per 24 hour   Intake 314.07 ml   Output 700 ml   Net -385.93 ml     Physical exam:  -----------------  Awake and oriented to place and person  Cardiovascular:      Rate and Rhythm: Normal rate. Rhythm irregular.   Pulmonary:      Effort: Pulmonary effort is normal.   Abdominal:      General: Abdomen is flat.   Musculoskeletal:         General: Deformity present. Normal range of motion.      Cervical back: Normal range of motion.      Comments: Abscence of left hand first finger   Skin:     General: Skin is warm.      Capillary Refill: Capillary refill takes less than 2 seconds.      Comments: Skin tear healing to L heal  Wound to bilateral feet and sacrum     Medications     Scheduled Medications:   apixaban, 2.5 mg, oral, BID  doxycylcine, 100 mg, oral, Daily  epoetin di or biosimilar, 10,000 Units, intravenous, Every Mon/Wed/Fri  escitalopram, 10 mg, oral, Nightly  fludrocortisone, 0.1 mg, oral, TID  gabapentin, 100 mg, oral, TID  heparin, 2,000 Units, intra-catheter, After Dialysis  heparin, 2,000 Units, intra-catheter, After  Dialysis  heparin, 2,000 Units, intra-catheter, After Dialysis  heparin, 2,000 Units, intra-catheter, After Dialysis  insulin lispro, 0-10 Units, subcutaneous, TID with meals  ipratropium-albuteroL, 3 mL, nebulization, q6h while awake  lidocaine, 5 mL, infiltration, Once  meropenem, 500 mg, intravenous, q12h  midodrine, 15 mg, oral, TID with meals  nystatin, , Topical, BID  nystatin, , Topical, BID  pantoprazole, 40 mg, oral, Daily   Or  pantoprazole, 40 mg, intravenous, Daily  perflutren lipid microspheres, 0.5-10 mL of dilution, intravenous, Once in imaging  perflutren protein A microsphere, 0.5 mL, intravenous, Once in imaging  psyllium, 1 packet, oral, BID  sodium chloride, 3 mL, nebulization, TID  sulfur hexafluoride microsphr, 2 mL, intravenous, Once in imaging  sulfur hexafluoride microsphr, 2 mL, intravenous, Once in imaging  vancomycin, 125 mg, oral, BID  zinc oxide, 1 Application, Topical, BID       Continuous Medications:   norepinephrine, 0.01-3 mcg/kg/min, Last Rate: 0.02 mcg/kg/min (02/11/24 1100)       PRN Medications:     Labs     Results from last 72 hours   Lab Units 02/11/24  0423 02/10/24  0452 02/08/24  1858   GLUCOSE mg/dL 190* 230* 178*   SODIUM mmol/L 131* 132* 130*   POTASSIUM mmol/L 3.3* 3.4 3.5   CHLORIDE mmol/L 99 98 98   CO2 mmol/L 20* 22* 22*   BUN mg/dL 16 12 17   CREATININE mg/dL 2.70* 2.00* 2.70*   EGFR mL/min/1.73m*2 18* 26* 18*   CALCIUM mg/dL 8.6 8.6 8.0*                   Results from last 72 hours   Lab Units 02/11/24  0423 02/10/24  0452 02/09/24  0537   WBC AUTO x10*3/uL 11.0 10.5 14.5*   NRBC AUTO /100 WBCs 0.0 0.0 0.0   RBC AUTO x10*6/uL 2.52* 2.56* 2.79*   HEMOGLOBIN g/dL 7.9* 8.0* 8.6*   HEMATOCRIT % 25.4* 25.5* 27.6*   MCV fL 101* 100 99   MCH pg 31.3 31.3 30.8   MCHC g/dL 31.1* 31.4* 31.2*   RDW % 22.8* 23.1* 22.7*   PLATELETS AUTO x10*3/uL 228 199 239         Lab Results   Component Value Date    BLOODCULT No growth at 4 days -  FINAL REPORT 01/19/2024    BLOODCULT No  growth at 4 days -  FINAL REPORT 01/19/2024    GRAMSTAIN (4+) Abundant Polymorphonuclear leukocytes (A) 02/10/2024    GRAMSTAIN (3+) Moderate Gram negative bacilli (A) 02/10/2024     Lab Results   Component Value Date    URINECULTURE >100,000 Proteus mirabilis (A) 01/19/2024       Imaging and Diagnostic Studies     Lab/Radiology/Diagnostic Review:  Results for orders placed or performed during the hospital encounter of 01/19/24 (from the past 24 hour(s))   Tissue/Wound Culture/Smear    Specimen: Wound/Tissue; Tissue/Biopsy   Result Value Ref Range    Tissue/Wound Culture/Smear Culture in progress     Gram Stain (2+) Few Polymorphonuclear leukocytes (A)     Gram Stain (2+) Few Gram negative bacilli (A)    Tissue/Wound Culture/Smear    Specimen: Other (specify in comments); Tissue/Biopsy   Result Value Ref Range    Tissue/Wound Culture/Smear Culture in progress     Gram Stain (4+) Abundant Polymorphonuclear leukocytes (A)     Gram Stain (3+) Moderate Gram negative bacilli (A)    POCT GLUCOSE   Result Value Ref Range    POCT Glucose 197 (H) 74 - 99 mg/dL   POCT GLUCOSE   Result Value Ref Range    POCT Glucose 180 (H) 74 - 99 mg/dL   CBC   Result Value Ref Range    WBC 11.0 4.4 - 11.3 x10*3/uL    nRBC 0.0 0.0 - 0.0 /100 WBCs    RBC 2.52 (L) 4.00 - 5.20 x10*6/uL    Hemoglobin 7.9 (L) 12.0 - 16.0 g/dL    Hematocrit 25.4 (L) 36.0 - 46.0 %     (H) 80 - 100 fL    MCH 31.3 26.0 - 34.0 pg    MCHC 31.1 (L) 32.0 - 36.0 g/dL    RDW 22.8 (H) 11.5 - 14.5 %    Platelets 228 150 - 450 x10*3/uL   Basic metabolic panel   Result Value Ref Range    Glucose 190 (H) 65 - 99 mg/dL    Sodium 131 (L) 133 - 145 mmol/L    Potassium 3.3 (L) 3.4 - 5.1 mmol/L    Chloride 99 97 - 107 mmol/L    Bicarbonate 20 (L) 24 - 31 mmol/L    Urea Nitrogen 16 8 - 25 mg/dL    Creatinine 2.70 (H) 0.40 - 1.60 mg/dL    eGFR 18 (L) >60 mL/min/1.73m*2    Calcium 8.6 8.5 - 10.4 mg/dL    Anion Gap 12 <=19 mmol/L   POCT GLUCOSE   Result Value Ref Range    POCT  Glucose 165 (H) 74 - 99 mg/dL     Upper extremity venous duplex bilateral    Result Date: 1/25/2024           [facility] [id number] [place], [place] [address]            Phone [phone]  Vascular Lab Report  [facility] US UPPER EXTREMITY VENOUS DUPLEX BILATERAL Patient Name:      BASIA VEGA      Reading Physician:  [id number] Mini Busby MD, RPVI Study Date:        1/25/2024           Ordering Provider:  [id number] ERI BERMAN MRN/PID:           [id number]            Fellow: Accession#:        [id number]        Technologist:       Dara Parr RVT Date of Birth/Age: 1950 / 73 years Technologist 2: Gender:            F                   Encounter#:         [id number] Admission Status:  Inpatient           Location Performed: [facility]  Diagnosis/ICD: Right arm swelling-M79.89; Left arm swelling-M79.89 CPT Codes:     71555 Peripheral venous duplex scan for DVT complete  CONCLUSIONS:  Right Upper Venous: No evidence of acute deep vein thrombus visualized in the right upper extremity. Internal jugular vein was visualized in segments due to IV lines and bandages. Left Upper Venous: No evidence of acute deep vein thrombus visualized in the left upper extremity. Subclavian stent is noted and appears patent. There is a known occluded dialysis access noted.  Additional Findings: Technically difficult exam due to IV lines, bandages, and patient's positioning.  Imaging & Doppler Findings:  Right               Compressible Thrombus        Flow Internal Jugular        Yes        None   Spontaneous/Phasic Subclavian              Yes        None Subclavian Proximal     Yes        None   Spontaneous/Phasic Subclavian Mid          Yes        None Subclavian Distal       Yes        None   Spontaneous/Phasic Axillary                Yes        None       Pulsatile Brachial                Yes        None Cephalic                Yes        None  Basilic                 Yes        None  Left                Compress Thrombus        Flow Internal Jugular      Yes      None       Pulsatile Subclavian            Yes      None Subclavian Proximal   Yes      None       Pulsatile Subclavian Mid        Yes      None       Pulsatile Subclavian Distal     Yes      None       Pulsatile Axillary              Yes      None   Spontaneous/Phasic Brachial              Yes      None Cephalic              Yes      None Basilic               Yes      None  [id number] Mini Busby MD, RPVI Electronically signed by [id number] Mini Busby MD, ALICE on 1/25/2024 at 4:06:13 PM  ** Final **     ECG 12 lead    Result Date: 1/24/2024  Sinus tachycardia  with 1st degree AV block Right bundle branch block Possible Lateral infarct , age undetermined Abnormal ECG Confirmed by Yesika Nj ([id number]) on 1/24/2024 6:54:45 AM    XR tibia fibula right 2 views    Result Date: 1/22/2024  Interpreted By:  Rela Mendieta, STUDY: XR TIBIA FIBULA RIGHT 2 VIEWS; 1/22/2024 2:15 pm   INDICATION: Signs/Symptoms:evaluate source of infection, osteomyelitis;   COMPARISON: None available.   ACCESSION NUMBER(S): [id number]   ORDERING CLINICIAN: BAN GIFFORD   TECHNIQUE: Views: AP and Lateral of the right tibia and fibula   FINDINGS: RESULT: There is no evidence for fracture or dislocation. Tricompartmental degenerative changes of the right knee. The tibia and fibula appear intact without bony erosion or evidence for osteomyelitis. No other bony or soft tissue abnormality is identified. No subcutaneous gas is noted.       No evidence for acute osseous abnormality. No bony erosion to suggest osteomyelitis.   Signed by: Real Mendieta 1/22/2024 3:15 PM Dictation workstation:   [id number]    XR ankle right 3+ views    Result Date: 1/22/2024  Interpreted By:  Real Mendieta, STUDY: XR ANKLE RIGHT 3+ VIEWS; 1/22/2024 2:15 pm   INDICATION: Signs/Symptoms:Evuluate source of infection, osteomyelitis;   COMPARISON:  None available.   ACCESSION NUMBER(S): [id number]   ORDERING CLINICIAN: BAN GIFFORD   TECHNIQUE: Views: AP, Lateral, Oblique, right ankle   FINDINGS: RESULT: There is no evidence for fracture or dislocation. The ankle mortise is intact. Joint spaces appear adequately maintained. No bony erosion to suggest osteomyelitis. No bone lesion or soft tissue abnormality is identified. No subcutaneous gas is seen.       No evidence for acute osseous abnormality. No bony erosion to suggest osteomyelitis.   Signed by: Real Mendieta 1/22/2024 3:14 PM Dictation workstation:   [id number]    Transthoracic Echo (TTE) Complete    Result Date: 1/22/2024           [place] [address]            Phone [phone] TRANSTHORACIC ECHOCARDIOGRAM REPORT  Patient Name:      BASIA Gray Physician:   [id number] Fausto Rowe DO Study Date:        1/22/2024           Ordering Provider:   [id number] ERI BERMAN MRN/PID:           [id number]            Fellow: Accession#:        [id number]        Nurse: Date of Birth/Age: 1950 / 73 years Sonographer:         Tabatha Page RDCS Gender:            F                   Additional Staff: Height:            157.00 cm           Admit Date: Weight:            75.00 kg            Admission Status:    Inpatient - Routine BSA:               1.76 m2             Department Location: [facility] Blood Pressure: 88 /49 mmHg Study Type:    TRANSTHORACIC ECHO (TTE) COMPLETE Diagnosis/ICD: Chronic combined systolic (congestive) and diastolic (congestive)                heart failure (CHF)-I50.42; Sepsis, unspecified organism-A41.9 Indication:    heart failure,septic shock CPT Codes:     Echo Complete w Full Doppler-37650 Patient History: BMI:               Obese >30 Pertinent History: Sepsis, PVD, A-Fib, CVA, Cancer and HTN. breast                    prosthesis/ca, ESRD,anemia,septic  shock,heart failure. Study Detail: The following Echo studies were performed: 2D, M-Mode, Doppler and               color flow. Technically challenging study due to prosthesis,               prominent lung artifact and body habitus.  PHYSICIAN INTERPRETATION: Left Ventricle: Left ventricular systolic function is normal, with an estimated ejection fraction of 70%. There are no regional wall motion abnormalities. The left ventricular cavity size is normal. Left ventricular diastolic filling was indeterminate. Left Atrium: The left atrium is moderately dilated. Right Ventricle: The right ventricle is normal in size. There is normal right ventricular global systolic function. Right Atrium: The right atrium is normal in size. Aortic Valve: The aortic valve is trileaflet. There is no evidence of aortic valve regurgitation. The peak instantaneous gradient of the aortic valve is 2.8 mmHg. Mitral Valve: The mitral valve is normal in structure. There is no evidence of mitral valve regurgitation. Tricuspid Valve: The tricuspid valve is structurally normal. There is trace tricuspid regurgitation. Pulmonic Valve: The pulmonic valve is not well visualized. The pulmonic valve regurgitation was not well visualized. Pericardium: There is no pericardial effusion noted. Aorta: The aortic root is normal.  CONCLUSIONS:  1. Left ventricular systolic function is normal with a 70% estimated ejection fraction.  2. The left atrium is moderately dilated.  3. Left ventricular diastolic filling indeterminate. QUANTITATIVE DATA SUMMARY: 2D MEASUREMENTS:                          Normal Ranges: LAs:           4.50 cm   (2.7-4.0cm) IVSd:          0.61 cm   (0.6-1.1cm) LVPWd:         0.88 cm   (0.6-1.1cm) LVIDd:         3.66 cm   (3.9-5.9cm) LV Mass Index: 41.9 g/m2 LV SYSTOLIC FUNCTION BY 2D PLANIMETRY (MOD):                     Normal Ranges: EF-A4C View: 68.3 % (>=55%) LV DIASTOLIC FUNCTION:                     Normal Ranges: MV Peak E: 1.19 m/s  (0.7-1.2 m/s) MV Peak A: 0.90 m/s (0.42-0.7 m/s) E/A Ratio: 1.32     (1.0-2.2) MITRAL VALVE:                 Normal Ranges: MV DT: 192 msec (150-240msec) AORTIC VALVE:                         Normal Ranges: AoV Vmax:      0.84 m/s (<=1.7m/s) AoV Peak P.8 mmHg (<20mmHg) LVOT Max Shimon:  0.47 m/s (<=1.1m/s) LVOT Diameter: 1.90 cm  (1.8-2.4cm) AoV Area,Vmax: 1.57 cm2 (2.5-4.5cm2) TRICUSPID VALVE/RVSP:                   Normal Ranges: IVC Diam: 1.05 cm  [id number] Fausto Rowe DO Electronically signed on 2024 at 2:53:04 PM  ** Final **     XR foot left 3+ views    Result Date: 2024  Interpreted By:  Real Mendieta, STUDY: XR FOOT LEFT 1-2 VIEWS; 2024 4:15 pm   INDICATION: Signs/Symptoms:osteomyelitis. Pain   COMPARISON: 2023   ACCESSION NUMBER(S): [id number]   ORDERING CLINICIAN: ABN GIFFORD   TECHNIQUE: Views:  AP, Lat, Oblique, left foot   FINDINGS: RESULT: There is no evidence for fracture or dislocation. 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 Mild hammertoe deformities. Joint spaces appear adequately maintained. Soft tissue ulceration of the posterior aspect of the heel however no evidence for bony erosion to suggest osteomyelitis.       No evidence for acute fracture or acute bony erosion to suggest osteomyelitis. Chronic changes as described.   Signed by: Real Mendieta 2024 7:15 PM Dictation workstation:   [id number]    CT chest abdomen pelvis wo IV contrast    Result Date: 2024  Interpreted By:  Maria Garcia, STUDY: CT CHEST ABDOMEN PELVIS WO CONTRAST;  2024 11:42 pm   INDICATION: Mental status change. Elevated white blood cell count with infectious workup.   COMPARISON: 2023   ACCESSION NUMBER(S): [id number]   ORDERING CLINICIAN: BAN GIFFORD   TECHNIQUE: CT of the chest, abdomen and pelvis was performed with no oral or intravenous contrast administered. Sagittal and coronal reformations were completed by the technologist at the  acquisition scanner.   All CT examinations are performed with 1 or more of the following dose reduction techniques: Automated exposure control, adjustment of mA and/or kv according to patient's size, or use of iterative reconstruction techniques.   FINDINGS: Please note that the study is limited without intravenous contrast.   CHEST: Bilateral pleural effusions are present with right effusion moderately small in size and with the left effusion moderately small in size as well. There is shift of the heart and mediastinum to the left of midline due to atelectasis of the left upper lobe. There is consolidation throughout left lower lobe with air bronchograms noted. On the right, there is compressive atelectasis seen posteriorly in the right lower lobe.   There is mild reactive mediastinal adenopathy seen at this time with mediastinal lymph nodes increased in size since prior study. Several of these mediastinal nodes are at the upper limits of normal measuring 8 mm in short axis diameter.   No pericardial effusion is present. The cardiac size is normal with mitral annulus calcification.   There has been prior bilateral mastectomy with reconstruction of the left breast with implant placement. Surgical clips are visible within the left axilla. Stent grafts are visible within the left upper extremity and within the left innominate, subclavian as well as axillary veins.   A peripherally calcified 1.8 cm nodule arises from the lower pole of left thyroid lobe.   ABDOMEN:   LIVER: Unremarkable.   BILE DUCTS: No intrahepatic or extrahepatic biliary ductal dilatation is seen.   GALLBLADDER: Cholelithiasis is observed with some calcification of the gallbladder wall demonstrated as well. No gallbladder wall thickening or pericholecystic fluid is evident.   PANCREAS: Unremarkable   SPLEEN: Unremarkable   ADRENAL GLANDS: Unremarkable   KIDNEYS AND URETERS: Kidneys are small in size with extensive renal artery calcification  demonstrated.   PELVIS:   BLADDER: Amos catheter is present within the decompressed urinary bladder.   REPRODUCTIVE ORGANS: Calcification of the arcuate arteries within the uterus is seen. There is no uterine enlargement or adnexal mass.   BOWEL: A rectal balloon is seen. There is no abnormal distention of the intestinal tract.   VESSELS: There is atherosclerosis of the thoracoabdominal aorta and iliac arteries with calcification in the walls of the celiac, splenic, hepatic, and mesenteric arteries.   PERITONEUM/RETROPERITONEUM/LYMPH NODES: There is a minimal amount of ascites seen about the liver and spleen with mild presacral edema noted.   ABDOMINAL WALL: There is anasarca involving the soft tissues of the abdomen and pelvis. Postoperative change from ventral hernia repair is seen.   BONES: Bilateral sacroiliitis is seen. There is lumbar dextroscoliosis. Chronic rotator cuff tear is present bilaterally with osteoarthritis of both shoulders, right greater than left.       Chest 1.  Moderately small bilateral pleural effusions with left upper lobe atelectasis and compressive atelectasis seen posteriorly in right lower lobe. A left lower lobe pneumonia is identified. There is extensive airspace consolidation within left lower lobe with air bronchograms observed. Mild mediastinal reactive adenopathy is present as well.   Abdomen-Pelvis 1.  Cholelithiasis without evidence of cholecystitis. 2. Small amount of ascites with mild presacral edema and anasarca within the soft tissues of the abdominal wall. 3. Renal atrophy with extensive vascular calcifications.     MACRO: None   Signed by: Maria Garcia 1/21/2024 8:30 AM Dictation workstation:   [id number]    CT head wo IV contrast    Result Date: 1/21/2024  Interpreted By:  Maria Garcia, STUDY: CT HEAD WO IV CONTRAST 1/20/2024 11:42 pm   INDICATION: Signs/Symptoms:mental status changes   COMPARISON: 12/11/2023   ACCESSION NUMBER(S): [id number]   ORDERING CLINICIAN: BAN  GIFFORD   TECHNIQUE: Unenhanced axial images of the brain are completed.   All CT examinations are performed with 1 or more of the following dose reduction techniques: Automated exposure control, adjustment of mA and/or kv according to patient's size, or use of iterative reconstruction techniques.   FINDINGS: Helical unenhanced axial images of the brain demonstrate a mild to moderate degree of ventricular enlargement with proportionate widening of the sulci and sylvian fissures. There is no midline shift, mass effect, extra-axial fluid collection, or acute intracranial hemorrhage. There is diminished density seen in the periventricular white matter indicating chronic microvascular ischemic disease. There is calcified plaque seen within the distal vertebral and internal carotid arteries bilaterally. No calvarial abnormality is seen.       Atrophy and chronic microvascular ischemic disease without acute intracranial process.   Signed by: Maria Garcia 1/21/2024 8:09 AM Dictation workstation:   [id number]    XR chest 1 view    Result Date: 1/20/2024  Interpreted By:  Brendon Perez, STUDY: XR CHEST 1 VIEW; 1/20/2024 5:03 pm   INDICATION: CLINICAL INFORMATION: Signs/Symptoms:Insertion right IJ central line, also left thoracentesis.   COMPARISON: 01/19/2024 at 1338 hours   ACCESSION NUMBER(S): [id number]   ORDERING CLINICIAN: BOZENA ANTONIO   TECHNIQUE: Portable chest one view.   FINDINGS: The cardiac size is indeterminate in view of the AP projection. There is no change in the right-sided dual port central venous catheter. There is a new right internal jugular venous catheter present with the tip at approximately same level as the dual port central venous catheter, overlying the mid to lower right atrium. There is no evidence for pneumothorax. Patient has a history of left thoracentesis without evidence for pneumothorax on the left. There is bilateral basilar alveolar infiltrate and effusions. Left effusion is decreased  compared to the study from 1 day earlier.   Surgical clips are identified within left chest wall. Endovascular stent is identified in the distribution of the left subclavian artery.       1. Status post right-sided central venous catheter placement as described above with the tip overlying the mid to caudal aspect of the right atrium. There is no evidence for pneumothorax. 2. No evidence for left-sided pneumothorax after left-sided thoracentesis. Decrease in the left effusion. 3. Bilateral basilar infiltrates and effusions are present. Follow-up to assure complete clearing is suggested.   MACRO: none   Signed by: Brendon Perez 1/20/2024 5:11 PM Dictation workstation:   [id number]    XR chest 1 view    Result Date: 1/19/2024  Interpreted By:  Real Mendieta, STUDY: XR CHEST 1 VIEW; 1/19/2024 1:38 pm   INDICATION: Signs/Symptoms:dyspnea.   COMPARISON: 12/26/2023   ACCESSION NUMBER(S): [id number]   ORDERING CLINICIAN: EMELY GOLDSMITH   TECHNIQUE: 1 view of the chest was performed.   FINDINGS: There may be a minimal right pleural effusion. Slight prominence of the interstitium otherwise the right lung is clear. Improved appearance of the left lung with improved aeration of the right upper lobe. There is opacification of the left lower lobe possibly due to large pleural effusion which is similar to the prior study. No pneumothorax. Right-sided dual lumen central line catheter with tip at the proximal atrium. The cardiomediastinal silhouette is within normal limits. Left-sided subclavian stents are again noted.       Improved aeration and appearance of the left lung superiorly however there is a persistent large left pleural effusion and opacification of the left lower lobe.   Signed by: Real Mendieta 1/19/2024 1:44 PM Dictation workstation:   [id number]         Assessment / Plan       PROBLEM LIST:  - End stage heart failure with preserved EF - on vasopressors  - End stage renal disease on HD  - Cardiogenic shock  - will continue to wean down levo her blood pressure measured 1 does not reflect of the actual blood pressure as the patient is fully awake despite readings of systolic in the 40s and 50s arterial line was also inaccurate due to vasculopathy  - Acute metabolic encephalopathy  - Failure to wean from vasopressors  - Goals of care discussion    MANAGEMENT PLAN:  - Start weaning levo and assess mental status  - will receive HD tomorrow  - Check lactate tomorrow am  -Already on Fluorinef tid  - Palliative care consult  - Cardiology consultation - patient's daughter declined a RHC  - Peer to peer done with insurance, NOT approved  - Family will update us with goals of care  - Cont Doxy and Vanc PO. Meropenem added for GNB in swab culture from right heal   Appreciate ID consult.  - Routine ICU care  - PT/OT  Overall prognosis poor due to chronic medical problems including end-stage renal disease heart failure patient is bedridden with decubitus ulcers  I have personally interviewed and examined the patient.  I have personally verified elements of the exam listed above. Interval changes or irregularities are as noted.  I have personally and independently reviewed laboratory, radiographic and procedural data.  I have personally reviewed the problem list above and concur. Changes, if any, are noted.  I have personally reviewed the plan list above and concur. Changes, if any, are noted.    The patient has high probability of compromise including but not limited to organ system failure, mechanical respiratory and circulatory support, cardiac arrest and death. I have discussed this in detail with the family / caregivers.    I have personally spent  35 minutes of face to face critical care time (not including billable procedures billed separately) in taking care of the patient.  I have personally answered all questions to the satisfaction of the patient and / or family members to their satisfaction.      Tarek Gharibeh MD

## 2024-02-11 NOTE — CARE PLAN
"The patient's goals for the shift include excited to have \"fabiana\" with her family tomorrow    The clinical goals for the shift include maintain mentation and wean levophed as tolerated      Problem: Pain  Goal: My pain/discomfort is manageable  Outcome: Progressing     Problem: Safety  Goal: Patient will be injury free during hospitalization  Outcome: Progressing  Goal: I will remain free of falls  Outcome: Progressing     Problem: Daily Care  Goal: Daily care needs are met  Outcome: Progressing     Problem: Psychosocial Needs  Goal: Demonstrates ability to cope with hospitalization/illness  Outcome: Progressing  Goal: Collaborate with me, my family, and caregiver to identify my specific goals  Outcome: Progressing     Problem: Discharge Barriers  Goal: My discharge needs are met  Outcome: Progressing     Problem: Fall/Injury  Goal: Not fall by end of shift  Outcome: Progressing  Goal: Be free from injury by end of the shift  Outcome: Progressing  Goal: Verbalize understanding of personal risk factors for fall in the hospital  Outcome: Progressing  Goal: Verbalize understanding of risk factor reduction measures to prevent injury from fall in the home  Outcome: Progressing  Goal: Pace activities to prevent fatigue by end of the shift  Outcome: Progressing     Problem: Skin  Goal: Decreased wound size/increased tissue granulation at next dressing change  Outcome: Progressing  Flowsheets (Taken 2/10/2024 2018)  Decreased wound size/increased tissue granulation at next dressing change:   Promote sleep for wound healing   Protective dressings over bony prominences  Goal: Participates in plan/prevention/treatment measures  Outcome: Progressing  Flowsheets (Taken 2/10/2024 2018)  Participates in plan/prevention/treatment measures: Elevate heels  Goal: Prevent/manage excess moisture  Outcome: Progressing  Flowsheets (Taken 2/10/2024 2018)  Prevent/manage excess moisture:   Moisturize dry skin   Cleanse " incontinence/protect with barrier cream   Follow provider orders for dressing changes   Monitor for/manage infection if present  Goal: Prevent/minimize sheer/friction injuries  Outcome: Progressing  Flowsheets (Taken 2/10/2024 2018)  Prevent/minimize sheer/friction injuries:   Use pull sheet   HOB 30 degrees or less   Turn/reposition every 2 hours/use positioning/transfer devices  Goal: Promote/optimize nutrition  Outcome: Progressing  Flowsheets (Taken 2/10/2024 2018)  Promote/optimize nutrition:   Monitor/record intake including meals   Offer water/supplements/favorite foods   Consume > 50% meals/supplements  Goal: Promote skin healing  Outcome: Progressing  Flowsheets (Taken 2/10/2024 2018)  Promote skin healing:   Assess skin/pad under line(s)/device(s)   Protective dressings over bony prominences   Turn/reposition every 2 hours/use positioning/transfer devices     Problem: Pain  Goal: Takes deep breaths with improved pain control throughout the shift  Outcome: Progressing  Goal: Turns in bed with improved pain control throughout the shift  Outcome: Progressing  Goal: Walks with improved pain control throughout the shift  Outcome: Progressing  Goal: Performs ADL's with improved pain control throughout shift  Outcome: Progressing     Problem: Respiratory  Goal: Clear secretions with interventions this shift  Outcome: Progressing  Goal: Minimize anxiety/maximize coping throughout shift  Outcome: Progressing  Goal: Minimal/no exertional discomfort or dyspnea this shift  Outcome: Progressing  Goal: Patent airway maintained this shift  Outcome: Progressing  Goal: Tolerate pulmonary toileting this shift  Outcome: Progressing  Goal: Verbalize decreased shortness of breath this shift  Outcome: Progressing  Goal: Increase self care and/or family involvement in next 24 hours  Outcome: Progressing  Goal: No signs of respiratory distress (eg. Use of accessory muscles. Peds grunting)  Outcome: Progressing     Problem:  Diabetes  Goal: Maintain electrolyte levels within acceptable range throughout shift  Outcome: Progressing  Goal: Maintain glucose levels >70mg/dl to <250mg/dl throughout shift  Outcome: Progressing  Goal: No changes in neurological exam by end of shift  Outcome: Progressing  Goal: Learn about and adhere to nutrition recommendations by end of shift  Outcome: Progressing  Goal: Vital signs within normal range for age by end of shift  Outcome: Progressing  Goal: Increase self care and/or family involovement by end of shift  Outcome: Progressing  Goal: Receive DSME education by end of shift  Outcome: Progressing     Problem: Discharge Planning  Goal: Discharge to home or other facility with appropriate resources  Outcome: Progressing     Problem: Chronic Conditions and Co-morbidities  Goal: Patient's chronic conditions and co-morbidity symptoms are monitored and maintained or improved  Outcome: Progressing

## 2024-02-11 NOTE — CARE PLAN
Problem: Pain  Goal: My pain/discomfort is manageable  Outcome: Progressing  Flowsheets (Taken 2/2/2024 1928)  Resident's pain/discomfort is manageable:   Include resident/family/caregiver in decisions related to pain management   Offer non-pharmacological pain management interventions   Administer pain medication prior to activities that may trigger pain   Identify and avoid pain triggers     Problem: Safety  Goal: Patient will be injury free during hospitalization  Outcome: Progressing  Goal: I will remain free of falls  Outcome: Progressing  Flowsheets (Taken 2/1/2024 1837)  Resident will remain free of falls:   Use gait belt for all transfers   Apply bed/chair alarms as appropriate   Accompany resident as ordered (ex. 1:1, stand-by assist, dayroom monitoring, 15 minute checks, line of sight)   Maintain bed at position as ordered (chair height, low bed)     Problem: Daily Care  Goal: Daily care needs are met  Outcome: Progressing  Flowsheets (Taken 2/2/2024 1928)  Daily care needs are met:   Assess and monitor ability to perform self care and identify potential discharge needs   Assess skin integrity/risk for skin breakdown   Encourage independent activity per ability   Provide mouth care   Include patient/family/caregiver in decisions related to daily care   Assist patient with activities of daily living as needed     Problem: Psychosocial Needs  Goal: Demonstrates ability to cope with hospitalization/illness  Outcome: Progressing  Flowsheets (Taken 2/1/2024 1837)  Demonstrates ability to cope with hospitalization/illness:   Encourage verbalization of feelings/concerns/expectations   Provide low-stimulation environment as needed   Assist resident to identify and practice own strengths and abilities   Encourage resident to set and complete small goals for self   Encourage participation in diversional activities   Reinforce positive adaptation of new coping behaviors   Include resident/family/caregiver in  decisions related to psychosocial needs  Goal: Collaborate with me, my family, and caregiver to identify my specific goals  Outcome: Progressing     Problem: Discharge Barriers  Goal: My discharge needs are met  Outcome: Progressing  Flowsheets (Taken 2/2/2024 1928)  Resident's discharge needs are met:   Identify potential discharge barriers on admission and throughout stay   Involve resident/family/caregiver in discharge planning process     Problem: Fall/Injury  Goal: Not fall by end of shift  Outcome: Progressing  Goal: Be free from injury by end of the shift  Outcome: Progressing  Goal: Verbalize understanding of personal risk factors for fall in the hospital  Outcome: Progressing  Goal: Verbalize understanding of risk factor reduction measures to prevent injury from fall in the home  Outcome: Progressing  Goal: Pace activities to prevent fatigue by end of the shift  Outcome: Progressing     Problem: Skin  Goal: Decreased wound size/increased tissue granulation at next dressing change  Outcome: Progressing  Flowsheets (Taken 2/11/2024 1724)  Decreased wound size/increased tissue granulation at next dressing change:   Promote sleep for wound healing   Utilize specialty bed per algorithm   Protective dressings over bony prominences  Goal: Participates in plan/prevention/treatment measures  Outcome: Progressing  Flowsheets (Taken 2/11/2024 1724)  Participates in plan/prevention/treatment measures: Elevate heels  Goal: Prevent/manage excess moisture  Outcome: Progressing  Flowsheets (Taken 2/11/2024 1724)  Prevent/manage excess moisture:   Follow provider orders for dressing changes   Monitor for/manage infection if present   Moisturize dry skin   Cleanse incontinence/protect with barrier cream  Goal: Prevent/minimize sheer/friction injuries  Outcome: Progressing  Flowsheets (Taken 2/11/2024 1724)  Prevent/minimize sheer/friction injuries:   Complete micro-shifts as needed if patient unable. Adjust patient position  to relieve pressure points, not a full turn   HOB 30 degrees or less   Increase activity/out of bed for meals   Turn/reposition every 2 hours/use positioning/transfer devices   Use pull sheet   Utilize specialty bed per algorithm  Goal: Promote/optimize nutrition  Outcome: Progressing  Flowsheets (Taken 2/11/2024 1724)  Promote/optimize nutrition:   Consume > 50% meals/supplements   Offer water/supplements/favorite foods   Monitor/record intake including meals  Goal: Promote skin healing  Outcome: Progressing  Flowsheets (Taken 2/11/2024 1724)  Promote skin healing:   Assess skin/pad under line(s)/device(s)   Ensure correct size (line/device) and apply per  instructions   Protective dressings over bony prominences   Turn/reposition every 2 hours/use positioning/transfer devices     Problem: Pain  Goal: Takes deep breaths with improved pain control throughout the shift  Outcome: Progressing  Goal: Turns in bed with improved pain control throughout the shift  Outcome: Progressing  Goal: Walks with improved pain control throughout the shift  Outcome: Progressing  Goal: Performs ADL's with improved pain control throughout shift  Outcome: Progressing     Problem: Respiratory  Goal: Clear secretions with interventions this shift  Outcome: Progressing  Flowsheets (Taken 2/1/2024 1837)  Clear secretions with interventions this shift:   Encourage/provide pulmonary hygiene/secretion clearance   Med administration/monitoring of effect  Goal: Minimize anxiety/maximize coping throughout shift  Outcome: Progressing  Flowsheets (Taken 2/1/2024 1837)  Minimize anxiety/maximize coping throughout shift:   Med administration/monitoring of effect   Monitor pain/anxiety level  Goal: Minimal/no exertional discomfort or dyspnea this shift  Outcome: Progressing  Flowsheets (Taken 2/10/2024 1755)  Minimal/no exertional discomfort or dyspnea this shift: Positioning to promote ventilation/comfort  Goal: Patent airway maintained  this shift  Outcome: Progressing  Goal: Tolerate pulmonary toileting this shift  Outcome: Progressing  Flowsheets (Taken 2/11/2024 1724)  Tolerate pulmonary toileting this shift: Positioning to promote ventilation/comfort  Goal: Verbalize decreased shortness of breath this shift  Outcome: Progressing  Flowsheets (Taken 2/11/2024 1724)  Verbalize decreased shortness of breath this shift: Encourage/provide pulmonary hygiene/secretion clearance  Goal: Increase self care and/or family involvement in next 24 hours  Outcome: Progressing  Flowsheets (Taken 2/11/2024 1724)  Increase self care and/or family involvement in next 24 hours: Encourage activity/mobility  Goal: No signs of respiratory distress (eg. Use of accessory muscles. Peds grunting)  Outcome: Progressing     Problem: Diabetes  Goal: Maintain electrolyte levels within acceptable range throughout shift  Outcome: Progressing  Flowsheets (Taken 2/1/2024 5937)  Maintain electrolyte levels within acceptable range throughout shift:   Monitor urine output   Med administration/monitoring of effect  Goal: Maintain glucose levels >70mg/dl to <250mg/dl throughout shift  Outcome: Progressing  Flowsheets (Taken 2/10/2024 1755)  Maintain glucose levels >70mg/dl to <250mg/dl throughout shift: Med administration/monitoring of effect  Goal: No changes in neurological exam by end of shift  Outcome: Progressing  Flowsheets (Taken 2/10/2024 1755)  No changes in neurological exam by end of shift: Complete frequent neurological assessments  Goal: Learn about and adhere to nutrition recommendations by end of shift  Outcome: Progressing  Flowsheets (Taken 2/11/2024 1724)  Learn about and adhere to nutrition recommendations by end of shift: Ensure/encourage compliance with appropriate diet  Goal: Vital signs within normal range for age by end of shift  Outcome: Progressing  Flowsheets (Taken 2/10/2024 1755)  Vital signs within normal range for age by end of shift: Med  administration/monitoring of effect  Goal: Increase self care and/or family involovement by end of shift  Outcome: Progressing     Problem: Discharge Planning  Goal: Discharge to home or other facility with appropriate resources  Outcome: Progressing  Flowsheets (Taken 2/1/2024 1837)  Discharge to home or other facility with appropriate resources:   Identify barriers to discharge with patient and caregiver   Arrange for needed discharge resources and transportation as appropriate   Identify discharge learning needs (meds, wound care, etc)   Arrange for interpreters to assist at discharge as needed   Refer to discharge planning if patient needs post-hospital services based on physician order or complex needs related to functional status, cognitive ability or social support system     Problem: Chronic Conditions and Co-morbidities  Goal: Patient's chronic conditions and co-morbidity symptoms are monitored and maintained or improved  Outcome: Progressing  Flowsheets (Taken 2/1/2024 1837)  Care Plan - Patient's Chronic Conditions and Co-Morbidity Symptoms are Monitored and Maintained or Improved:   Monitor and assess patient's chronic conditions and comorbid symptoms for stability, deterioration, or improvement   Collaborate with multidisciplinary team to address chronic and comorbid conditions and prevent exacerbation or deterioration   Update acute care plan with appropriate goals if chronic or comorbid symptoms are exacerbated and prevent overall improvement and discharge   The patient's goals for the shift include Visit with family.    The clinical goals for the shift include Wean levophed as tolerated.    Over the shift, the patient made progress toward goals. Levophed was weaned. Mentation remained within normal limits.

## 2024-02-11 NOTE — CARE PLAN
Problem: Respiratory  Goal: Clear secretions with interventions this shift  Outcome: Progressing  Goal: Minimize anxiety/maximize coping throughout shift  Outcome: Progressing  Goal: Minimal/no exertional discomfort or dyspnea this shift  Outcome: Progressing  Goal: Patent airway maintained this shift  Outcome: Progressing  Goal: Tolerate pulmonary toileting this shift  Outcome: Progressing  Goal: Verbalize decreased shortness of breath this shift  Outcome: Progressing  Goal: Increase self care and/or family involvement in next 24 hours  Outcome: Progressing  Goal: No signs of respiratory distress (eg. Use of accessory muscles. Peds grunting)  Outcome: Progressing

## 2024-02-11 NOTE — PROGRESS NOTES
Ayanna Gross is a 73 y.o. female on day 23 of admission presenting with Septic shock (CMS/HCC).    Subjective   Interval History:   Awake, responsive  Interval positive bilateral heel wound culture for gram-negative bacilli  Remains on pressors  Afebrile        Review of Systems   All other systems reviewed and are negative.      Objective   Range of Vitals (last 24 hours)  Heart Rate:  []   Temp:  [36 °C (96.8 °F)-37 °C (98.6 °F)]   Resp:  [14-30]   BP: (38-94)/(24-69)   Weight:  [89.6 kg (197 lb 8.5 oz)-100 kg (220 lb 10.9 oz)]   SpO2:  [87 %-100 %]   Daily Weight  02/11/24 : 89.6 kg (197 lb 8.5 oz)    Body mass index is 36.13 kg/m².    Physical Exam  Constitutional:       Appearance: Awake, alert  HENT:      Head: Normocephalic and atraumatic.      Nose: Nose normal.   Eyes:      Extraocular Movements: Extraocular movements intact.      Conjunctiva/sclera: Conjunctivae normal.   Cardiovascular:      Heart sounds: Normal heart sounds, S1 normal and S2 normal.   Pulmonary:      Breath sounds: Decreased breath sounds present.   Abdominal:      General: Bowel sounds are normal.      Palpations: Abdomen is soft.   Musculoskeletal:      Cervical back: Normal range of motion and neck supple.   Skin:     Comments: Stage III sacral ulcer, bilateral posterior heel eschar -photos examined  Neurological:      Mental Status: She is awake, alert    Antibiotics  aspirin tablet 325 mg  acetaminophen (Tylenol) tablet 650 mg  cefepime (Maxipime) 1 g in dextrose 5 % 50 mL IV  sodium chloride 0.9 % bolus 2,229 mL  apixaban (Eliquis) tablet 2.5 mg  escitalopram (Lexapro) tablet 10 mg  febuxostat (Uloric) tablet 40 mg  fenofibrate (Triglide) tablet 160 mg  gabapentin (Neurontin) capsule 100 mg  midodrine (Proamatine) tablet 15 mg  nystatin (Mycostatin) 100,000 unit/gram powder  oxyCODONE-acetaminophen (Percocet) 5-325 mg per tablet 1 tablet  simvastatin (Zocor) tablet 20 mg  piperacillin-tazobactam-dextrose (Zosyn) IV 2.25  g  vancomycin (Vancocin) capsule 125 mg  oxygen (O2) therapy  dextrose 50 % injection 25 g  glucagon (Glucagen) injection 1 mg  dextrose 10 % in water (D10W) infusion  insulin lispro (HumaLOG) injection 0-10 Units  nystatin (Mycostatin) 100,000 unit/gram powder 1 Application  vancomycin-diluent combo no.1 (Xellia) IVPB 1,750 mg  piperacillin-tazobactam-dextrose (Zosyn) IV 2.25 g  sodium chloride 0.9 % bolus 500 mL  norepinephrine (Levophed) 8 mg in dextrose 5% 250 mL (0.032 mg/mL) infusion (premix)  vancomycin (Vancocin) placeholder  pantoprazole (ProtoNix) EC tablet 40 mg  pantoprazole (ProtoNix) injection 40 mg  sennosides-docusate sodium (Judy-Colace) 8.6-50 mg per tablet 1 tablet  doxycycline (Vibramycin) in dextrose 5 % in water (D5W) 100 mL  mg  LORazepam (Ativan) injection 2 mg  magnesium sulfate IV 2 g  zinc oxide 20 % ointment 1 Application  nystatin (Mycostatin) cream  norepinephrine (Levophed) 8 mg in dextrose 5% 250 mL (0.032 mg/mL) infusion (premix)  heparin 1,000 unit/mL injection 2,000 Units  heparin 1,000 unit/mL injection 2,000 Units  nystatin (Mycostatin) ointment  acetaminophen (Tylenol) oral liquid 1,000 mg  albumin human 25 % solution 12.5 g  perflutren lipid microspheres (Definity) injection 0.5-10 mL of dilution  sulfur hexafluoride microsphr (Lumason) injection 24.28 mg  perflutren protein A microsphere (Optison) injection 0.5 mL  ipratropium-albuteroL (Duo-Neb) 0.5-2.5 mg/3 mL nebulizer solution 3 mL  sodium chloride 3 % nebulizer solution 3 mL  vancomycin-diluent combo no.1 (Xellia) IVPB 750 mg  sennosides-docusate sodium (Judy-Colace) 8.6-50 mg per tablet 1 tablet  acetaminophen (Tylenol) tablet 650 mg  doxycycline (Vibramycin) capsule 100 mg  magnesium oxide (Mag-Ox) tablet 400 mg  vasopressin (Vasostrict) 0.2 unit/mL infusion  norepinephrine (Levophed) 8 mg in dextrose 5% 250 mL (0.032 mg/mL) infusion (premix)  heparin 1,000 unit/mL injection 2,000 Units  heparin 1,000 unit/mL  injection 2,000 Units  albumin human 25 % solution 12.5 g  vancomycin (Xellia) 1 g in 200 mL (Xellia) IVPB 1 g  ipratropium-albuteroL (Duo-Neb) 0.5-2.5 mg/3 mL nebulizer solution 3 mL  sodium chloride 3 % nebulizer solution 3 mL  albumin human 5 % infusion 12.5 g  heparin 1,000 unit/mL injection 2,000 Units  heparin 1,000 unit/mL injection 2,000 Units  vancomycin (Vancocin) capsule 125 mg  albumin human 25 % solution 12.5 g  heparin 1,000 unit/mL injection 2,000 Units  heparin 1,000 unit/mL injection 2,000 Units  ipratropium-albuteroL (Duo-Neb) 0.5-2.5 mg/3 mL nebulizer solution 3 mL  lidocaine (Xylocaine) 10 mg/mL (1 %) injection 50 mg  sodium phosphate 15 mmol in sodium chloride 0.9% 250 mL IV  sod phos di, mono-K phos mono (K Phos Neutral) tablet 250 mg  potassium, sodium phosphates (Phos-NaK) 280-160-250 mg packet 1 packet  doxycycline (Vibramycin) capsule 100 mg  fludrocortisone (Florinef) tablet 0.1 mg  gabapentin (Neurontin) capsule 100 mg  acetaminophen (Tylenol) tablet 1,000 mg  benzocaine-menthoL (Dermoplast) topical spray  vancomycin (Vancocin) capsule 125 mg  heparin 1,000 unit/mL injection 2,000 Units  heparin 1,000 unit/mL injection 2,000 Units  magnesium sulfate IV 2 g  albumin human 25 % solution 12.5 g  promethazine (Phenergan) injection 12.5 mg  ondansetron (Zofran) injection 4 mg  lidocaine (Xylocaine) 10 mg/mL (1 %) injection 50 mg  alteplase (Cathflo Activase) injection 2 mg  acetaminophen (Tylenol) tablet 650 mg  psyllium (Metamucil) 3.4 gram packet 1 packet  perflutren lipid microspheres (Definity) injection 0.5-10 mL of dilution  sulfur hexafluoride microsphr (Lumason) injection 24.28 mg  perflutren protein A microsphere (Optison) injection 0.5 mL  epoetin di-epbx (Retacrit) injection 10,000 Units  sulfur hexafluoride microsphr (Lumason) injection 24.28 mg  perflutren lipid microspheres (Definity) injection 0.5-10 mL of dilution  perflutren lipid microspheres (Definity) injection 0.5-10 mL  of dilution  sulfur hexafluoride microsphr (Lumason) injection 24.28 mg  perflutren protein A microsphere (Optison) injection 0.5 mL  ammonium lactate (Lac-Hydrin) 12 % lotion 1 Application  norepinephrine (Levophed) 8 mg in dextrose 5% 250 mL (0.032 mg/mL) infusion (premix)  fludrocortisone (Florinef) tablet 0.1 mg  albumin human 25 % solution 25 g  albumin human 25 % solution 25 g      Relevant Results  Labs  Results from last 72 hours   Lab Units 02/11/24  0423 02/10/24  0452 02/09/24  0537   WBC AUTO x10*3/uL 11.0 10.5 14.5*   HEMOGLOBIN g/dL 7.9* 8.0* 8.6*   HEMATOCRIT % 25.4* 25.5* 27.6*   PLATELETS AUTO x10*3/uL 228 199 239     Results from last 72 hours   Lab Units 02/11/24  0423 02/10/24  0452 02/08/24  1858   SODIUM mmol/L 131* 132* 130*   POTASSIUM mmol/L 3.3* 3.4 3.5   CHLORIDE mmol/L 99 98 98   CO2 mmol/L 20* 22* 22*   BUN mg/dL 16 12 17   CREATININE mg/dL 2.70* 2.00* 2.70*   GLUCOSE mg/dL 190* 230* 178*   CALCIUM mg/dL 8.6 8.6 8.0*   ANION GAP mmol/L 12 12 10   EGFR mL/min/1.73m*2 18* 26* 18*         Estimated Creatinine Clearance: 19.9 mL/min (A) (by C-G formula based on SCr of 2.7 mg/dL (H)).  C-Reactive Protein   Date Value Ref Range Status   12/25/2023 5.20 (H) 0.00 - 2.00 mg/dL Final     CRP   Date Value Ref Range Status   06/23/2023 19.9 (H) 0 - 2.0 MG/DL Final     Comment:     Performed at [address] [address]   05/22/2022 1.0 0 - 2.0 MG/DL Final     Comment:     Performed at [address] [address]     Microbiology  Reviewed-bilateral heel wound cultures pending  Imaging  Transthoracic Echo (TTE) Limited    Result Date: 2/7/2024           [facility] [id number] [place], [place] [address]            Phone [phone] TRANSTHORACIC ECHOCARDIOGRAM REPORT  Patient Name:      BASIA Gray Physician:    [id number] Herminio Lemus DO Study Date:        2/7/2024             Ordering Provider:    [id number] SANGITA LITTLEJOHN MRN/PID:           [id number]            Fellow: Accession#:        [id number]        Nurse: Date of Birth/Age: 1950 / 73 years Sonographer:          Rena Talavera ACS,                                                              FABIAN CONLEY Gender:            F                   Additional Staff: Height:            157.48 cm           Admit Date: Weight:                                Admission Status:     Inpatient - Routine BSA:               m2                  Department Location:  [facility] Blood Pressure: 95 /42 mmHg Study Type:    TRANSTHORACIC ECHO (TTE) LIMITED Diagnosis/ICD: Hypotension, unspecified-I95.9; Mitral valve disorder-I05.9 Indication:    Limited to assess for MS, Hypotension CPT Codes:     Echo Limited-38116; Color Doppler-51989; Doppler Limited-48448 Patient History: Pertinent History: Hypotension, hx of MV endocarditis, PAD, DM2 ESRD on Hd. Study Detail: The following Echo studies were performed: 2D, M-Mode, Doppler and               color flow. Technically challenging study due to poor acoustic               windows and L Breast implant. Unable to obtain suprasternal notch               view.  PHYSICIAN INTERPRETATION: Left Ventricle: Left ventricular systolic function is normal. There are no regional wall motion abnormalities. The left ventricular cavity size is normal. Left ventricular diastolic filling was not assessed. Left Atrium: The left atrium is moderate to severely dilated. Right Ventricle: The right ventricle is normal in size. There is normal right ventricular global systolic function. Right Atrium: The right atrium was not well visualized. Aortic Valve: The aortic valve was not assessed. Aortic valve regurgitation was not assessed. Mitral Valve: The mitral valve is abnormal. There is evidence of  mild mitral valve stenosis. The doppler estimated mean and peak diastolic pressure gradients are 4.0 mmHg and 6.6 mmHg respectively. There is severe mitral annular calcification. There is no evidence of mitral valve regurgitation. Tricuspid Valve: The tricuspid valve was not assessed. Tricuspid regurgitation was not assessed. Pulmonic Valve: The pulmonic valve is not well visualized. The pulmonic valve regurgitation was not assessed. Pericardium: There is no pericardial effusion noted. Aorta: The aortic root was not assessed.  CONCLUSIONS:  1. Left ventricular systolic function is normal.  2. The left atrium is moderate to severely dilated.  3. There is severe mitral annular calcification. QUANTITATIVE DATA SUMMARY: LV DIASTOLIC FUNCTION:                        Normal Ranges: MV Peak E:    1.23 m/s (0.7-1.2 m/s) MV Peak A:    1.12 m/s (0.42-0.7 m/s) E/A Ratio:    1.10     (1.0-2.2) MV lateral e' 0.04 m/s MV medial e'  0.07 m/s MITRAL VALVE:                      Normal Ranges: MV Vmax:    1.28 m/s (<=1.3m/s) MV peak P.6 mmHg (<5mmHg) MV mean P.0 mmHg (<48mmHg)  RIGHT VENTRICLE: RV Basal 2.97 cm  [id number] Lemus DO Electronically signed on 2024 at 3:50:21 PM  ** Final **     Transthoracic Echo (TTE) Limited    Result Date: 2024           [place] [address]            Phone [phone] TRANSTHORACIC ECHOCARDIOGRAM REPORT  Patient Name:      BASIA Gray Physician:    [id number] Lemus DO Study Date:        2024            Ordering Provider:    [id number] REBECA RESENDEZ MRN/PID:           [id number]            Fellow: Accession#:        [id number]        Nurse: Date of Birth/Age: 1950 /  years Sonographer:          Jennifer WALL Gender:            F                   Additional Staff: Height:             157.48 cm           Admit Date:           2/6/2024 Weight:            98.88 kg            Admission Status:     Inpatient - Routine BSA:               1.98 m2             Department Location: Blood Pressure: 59 /49 mmHg Study Type:    TRANSTHORACIC ECHO (TTE) LIMITED Diagnosis/ICD: Acute on chronic diastolic (congestive) heart failure                (CHF)-I50.33 Indication:    Acute on chronic diastolic congestive heart failure CPT Codes:     Echo Limited-32608; Color Doppler-44299; Doppler Limited-62709 Patient History: Pertinent History: PAF Peripheralvascular disease low blood pressure HTN Breast                    Cancer Mixed Hyperlipidemia HF CVA Hypotension DMII CAD CKD. Study Detail: The following Echo studies were performed: 2D, M-Mode, Doppler and               color flow. Technically challenging study due to body habitus,               patient lying in supine position, poor acoustic windows, prominent               lung artifact and Breast Prosthesis. Definity used as a contrast               agent for endocardial border definition. Unable to obtain               suprasternal notch view.  PHYSICIAN INTERPRETATION: Left Ventricle: Left ventricular systolic function is normal, with an estimated ejection fraction of 60-65%. There are no regional wall motion abnormalities. The left ventricular cavity size is normal. Left ventricular diastolic filling was indeterminate. Left Atrium: The left atrium is moderately dilated. Right Ventricle: The right ventricle is normal in size. There is normal right ventricular global systolic function. Right Atrium: The right atrium is normal in size. Aortic Valve: The aortic valve is trileaflet. There is no evidence of aortic valve regurgitation. The peak instantaneous gradient of the aortic valve is 5.3 mmHg. Mitral Valve: The mitral valve is normal in structure. There is severe mitral annular calcification. There is trace mitral valve regurgitation. Tricuspid Valve: The  tricuspid valve is structurally normal. There is mild tricuspid regurgitation. Pulmonic Valve: The pulmonic valve is not well visualized. The pulmonic valve regurgitation was not well visualized. Pericardium: There is no pericardial effusion noted. Aorta: The aortic root is normal.  CONCLUSIONS:  1. Left ventricular systolic function is normal with a 60-65% estimated ejection fraction.  2. The left atrium is moderately dilated.  3. There is severe mitral annular calcification. QUANTITATIVE DATA SUMMARY: 2D MEASUREMENTS:                          Normal Ranges: LAs:           3.80 cm   (2.7-4.0cm) IVSd:          0.92 cm   (0.6-1.1cm) LVPWd:         1.09 cm   (0.6-1.1cm) LVIDd:         2.98 cm   (3.9-5.9cm) LVIDs:         2.12 cm LV Mass Index: 41.2 g/m2 LV % FS        28.9 % LA VOLUME:                               Normal Ranges: LA Vol A4C:        58.3 ml    (22+/-6mL/m2) LA Vol A2C:        60.9 ml LA Vol BP:         62.1 ml LA Vol Index A4C:  29.4ml/m2 LA Vol Index A2C:  30.7 ml/m2 LA Vol Index BP:   31.3 ml/m2 LA Area A4C:       20.5 cm2 LA Area A2C:       20.1 cm2 LA Major Axis A4C: 6.1 cm LA Major Axis A2C: 5.6 cm LA Volume Index:   29.3 ml/m2 LA Vol A4C:        53.1 ml LA Vol A2C:        58.4 ml LV SYSTOLIC FUNCTION BY 2D PLANIMETRY (MOD):                     Normal Ranges: EF-A4C View: 68.2 % (>=55%) EF-A2C View: 65.9 % EF-Biplane:  67.8 % AORTIC VALVE:                         Normal Ranges: AoV Vmax:      1.15 m/s (<=1.7m/s) AoV Peak P.3 mmHg (<20mmHg) LVOT Max Shimon:  1.11 m/s (<=1.1m/s) LVOT VTI:      16.20 cm LVOT Diameter: 1.90 cm  (1.8-2.4cm) AoV Area,Vmax: 2.74 cm2 (2.5-4.5cm2) PULMONIC VALVE:                         Normal Ranges: PV Accel Time: 63 msec  (>120ms) PV Max Shimon:    1.0 m/s  (0.6-0.9m/s) PV Max P.3 mmHg  [id number] Herminio Lemus DO Electronically signed on 2024 at 7:58:25 AM  ** Final **     Upper extremity venous duplex bilateral    Result Date: 2024           [facility] [place] [address]            Phone [phone]  Vascular Lab Report  [facility] US UPPER EXTREMITY VENOUS DUPLEX BILATERAL Patient Name:      BASIA FREIDA Gray Physician:  [id number] Mini Busby MD, RPVI Study Date:        1/25/2024           Ordering Provider:  [id number] ERI ODETTE CULLEN MRN/PID:           [id number]            Fellow: Accession#:        [id number]        Technologist:       Dara Parr RVT Date of Birth/Age: 1950 / 73 years Technologist 2: Gender:            F                   Encounter#:         [id number] Admission Status:  Inpatient           Location Performed: [facility]  Diagnosis/ICD: Right arm swelling-M79.89; Left arm swelling-M79.89 CPT Codes:     60654 Peripheral venous duplex scan for DVT complete  CONCLUSIONS:  Right Upper Venous: No evidence of acute deep vein thrombus visualized in the right upper extremity. Internal jugular vein was visualized in segments due to IV lines and bandages. Left Upper Venous: No evidence of acute deep vein thrombus visualized in the left upper extremity. Subclavian stent is noted and appears patent. There is a known occluded dialysis access noted.  Additional Findings: Technically difficult exam due to IV lines, bandages, and patient's positioning.  Imaging & Doppler Findings:  Right               Compressible Thrombus        Flow Internal Jugular        Yes        None   Spontaneous/Phasic Subclavian              Yes        None Subclavian Proximal     Yes        None   Spontaneous/Phasic Subclavian Mid          Yes        None Subclavian Distal       Yes        None   Spontaneous/Phasic Axillary                Yes        None       Pulsatile Brachial                Yes        None Cephalic                Yes        None Basilic                 Yes        None  Left                Compress Thrombus        Flow Internal Jugular      Yes       None       Pulsatile Subclavian            Yes      None Subclavian Proximal   Yes      None       Pulsatile Subclavian Mid        Yes      None       Pulsatile Subclavian Distal     Yes      None       Pulsatile Axillary              Yes      None   Spontaneous/Phasic Brachial              Yes      None Cephalic              Yes      None Basilic               Yes      None  [id number] Mini Busby MD, ALICE Electronically signed by [id number] ALICE Kelly MD on 1/25/2024 at 4:06:13 PM  ** Final **     ECG 12 lead    Result Date: 1/24/2024  Sinus tachycardia  with 1st degree AV block Right bundle branch block Possible Lateral infarct , age undetermined Abnormal ECG Confirmed by Yesika Nj ([id number]) on 1/24/2024 6:54:45 AM    XR tibia fibula right 2 views    Result Date: 1/22/2024  Interpreted By:  Real Mendieta, STUDY: XR TIBIA FIBULA RIGHT 2 VIEWS; 1/22/2024 2:15 pm   INDICATION: Signs/Symptoms:evaluate source of infection, osteomyelitis;   COMPARISON: None available.   ACCESSION NUMBER(S): [id number]   ORDERING CLINICIAN: BAN GIFFORD   TECHNIQUE: Views: AP and Lateral of the right tibia and fibula   FINDINGS: RESULT: There is no evidence for fracture or dislocation. Tricompartmental degenerative changes of the right knee. The tibia and fibula appear intact without bony erosion or evidence for osteomyelitis. No other bony or soft tissue abnormality is identified. No subcutaneous gas is noted.       No evidence for acute osseous abnormality. No bony erosion to suggest osteomyelitis.   Signed by: Real Mendieta 1/22/2024 3:15 PM Dictation workstation:   [id number]    XR ankle right 3+ views    Result Date: 1/22/2024  Interpreted By:  Real Mendieta, STUDY: XR ANKLE RIGHT 3+ VIEWS; 1/22/2024 2:15 pm   INDICATION: Signs/Symptoms:Evuluate source of infection, osteomyelitis;   COMPARISON: None available.   ACCESSION NUMBER(S): [id number]   ORDERING CLINICIAN: BAN GIFFORD   TECHNIQUE: Views: AP, Lateral,  Oblique, right ankle   FINDINGS: RESULT: There is no evidence for fracture or dislocation. The ankle mortise is intact. Joint spaces appear adequately maintained. No bony erosion to suggest osteomyelitis. No bone lesion or soft tissue abnormality is identified. No subcutaneous gas is seen.       No evidence for acute osseous abnormality. No bony erosion to suggest osteomyelitis.   Signed by: Real Mendieta 1/22/2024 3:14 PM Dictation workstation:   [id number]    Transthoracic Echo (TTE) Complete    Result Date: 1/22/2024           [place] [address]            Phone [phone] TRANSTHORACIC ECHOCARDIOGRAM REPORT  Patient Name:      BASIA VEGA      Isaac Physician:   [id number] [facility]usto [facility] Study Date:        1/22/2024           Ordering Provider:   [id number] ERI BERMAN MRN/PID:           [id number]            Fellow: Accession#:        [id number]        Nurse: Date of Birth/Age: 1950 / 73 years Sonographer:         Tabatha Page RDCS Gender:            F                   Additional Staff: Height:            157.00 cm           Admit Date: Weight:            75.00 kg            Admission Status:    Inpatient - Routine BSA:               1.76 m2             Department Location: [facility] Blood Pressure: 88 /49 mmHg Study Type:    TRANSTHORACIC ECHO (TTE) COMPLETE Diagnosis/ICD: Chronic combined systolic (congestive) and diastolic (congestive)                heart failure (CHF)-I50.42; Sepsis, unspecified organism-A41.9 Indication:    heart failure,septic shock CPT Codes:     Echo Complete w Full Doppler-22836 Patient History: BMI:               Obese >30 Pertinent History: Sepsis, PVD, A-Fib, CVA, Cancer and HTN. breast                    prosthesis/ca, ESRD,anemia,septic shock,heart failure. Study Detail: The following Echo studies were performed: 2D, M-Mode, Doppler and               color  flow. Technically challenging study due to prosthesis,               prominent lung artifact and body habitus.  PHYSICIAN INTERPRETATION: Left Ventricle: Left ventricular systolic function is normal, with an estimated ejection fraction of 70%. There are no regional wall motion abnormalities. The left ventricular cavity size is normal. Left ventricular diastolic filling was indeterminate. Left Atrium: The left atrium is moderately dilated. Right Ventricle: The right ventricle is normal in size. There is normal right ventricular global systolic function. Right Atrium: The right atrium is normal in size. Aortic Valve: The aortic valve is trileaflet. There is no evidence of aortic valve regurgitation. The peak instantaneous gradient of the aortic valve is 2.8 mmHg. Mitral Valve: The mitral valve is normal in structure. There is no evidence of mitral valve regurgitation. Tricuspid Valve: The tricuspid valve is structurally normal. There is trace tricuspid regurgitation. Pulmonic Valve: The pulmonic valve is not well visualized. The pulmonic valve regurgitation was not well visualized. Pericardium: There is no pericardial effusion noted. Aorta: The aortic root is normal.  CONCLUSIONS:  1. Left ventricular systolic function is normal with a 70% estimated ejection fraction.  2. The left atrium is moderately dilated.  3. Left ventricular diastolic filling indeterminate. QUANTITATIVE DATA SUMMARY: 2D MEASUREMENTS:                          Normal Ranges: LAs:           4.50 cm   (2.7-4.0cm) IVSd:          0.61 cm   (0.6-1.1cm) LVPWd:         0.88 cm   (0.6-1.1cm) LVIDd:         3.66 cm   (3.9-5.9cm) LV Mass Index: 41.9 g/m2 LV SYSTOLIC FUNCTION BY 2D PLANIMETRY (MOD):                     Normal Ranges: EF-A4C View: 68.3 % (>=55%) LV DIASTOLIC FUNCTION:                     Normal Ranges: MV Peak E: 1.19 m/s (0.7-1.2 m/s) MV Peak A: 0.90 m/s (0.42-0.7 m/s) E/A Ratio: 1.32     (1.0-2.2) MITRAL VALVE:                 Normal  Ranges: MV DT: 192 msec (150-240msec) AORTIC VALVE:                         Normal Ranges: AoV Vmax:      0.84 m/s (<=1.7m/s) AoV Peak P.8 mmHg (<20mmHg) LVOT Max Shimon:  0.47 m/s (<=1.1m/s) LVOT Diameter: 1.90 cm  (1.8-2.4cm) AoV Area,Vmax: 1.57 cm2 (2.5-4.5cm2) TRICUSPID VALVE/RVSP:                   Normal Ranges: IVC Diam: 1.05 cm  [id number] Fausto Rowe DO Electronically signed on 2024 at 2:53:04 PM  ** Final **     XR foot left 3+ views    Result Date: 2024  Interpreted By:  Real Mendieta, STUDY: XR FOOT LEFT 1-2 VIEWS; 2024 4:15 pm   INDICATION: Signs/Symptoms:osteomyelitis. Pain   COMPARISON: 2023   ACCESSION NUMBER(S): [id number]   ORDERING CLINICIAN: BAN GIFFORD   TECHNIQUE: Views:  AP, Lat, Oblique, left foot   FINDINGS: RESULT: There is no evidence for fracture or dislocation. 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 Mild hammertoe deformities. Joint spaces appear adequately maintained. Soft tissue ulceration of the posterior aspect of the heel however no evidence for bony erosion to suggest osteomyelitis.       No evidence for acute fracture or acute bony erosion to suggest osteomyelitis. Chronic changes as described.   Signed by: Real Mendieta 2024 7:15 PM Dictation workstation:   [id number]    CT chest abdomen pelvis wo IV contrast    Result Date: 2024  Interpreted By:  Maria Garcia, STUDY: CT CHEST ABDOMEN PELVIS WO CONTRAST;  2024 11:42 pm   INDICATION: Mental status change. Elevated white blood cell count with infectious workup.   COMPARISON: 2023   ACCESSION NUMBER(S): [id number]   ORDERING CLINICIAN: BAN GIFFORD   TECHNIQUE: CT of the chest, abdomen and pelvis was performed with no oral or intravenous contrast administered. Sagittal and coronal reformations were completed by the technologist at the acquisition scanner.   All CT examinations are performed with 1 or more of the following dose reduction techniques:  Automated exposure control, adjustment of mA and/or kv according to patient's size, or use of iterative reconstruction techniques.   FINDINGS: Please note that the study is limited without intravenous contrast.   CHEST: Bilateral pleural effusions are present with right effusion moderately small in size and with the left effusion moderately small in size as well. There is shift of the heart and mediastinum to the left of midline due to atelectasis of the left upper lobe. There is consolidation throughout left lower lobe with air bronchograms noted. On the right, there is compressive atelectasis seen posteriorly in the right lower lobe.   There is mild reactive mediastinal adenopathy seen at this time with mediastinal lymph nodes increased in size since prior study. Several of these mediastinal nodes are at the upper limits of normal measuring 8 mm in short axis diameter.   No pericardial effusion is present. The cardiac size is normal with mitral annulus calcification.   There has been prior bilateral mastectomy with reconstruction of the left breast with implant placement. Surgical clips are visible within the left axilla. Stent grafts are visible within the left upper extremity and within the left innominate, subclavian as well as axillary veins.   A peripherally calcified 1.8 cm nodule arises from the lower pole of left thyroid lobe.   ABDOMEN:   LIVER: Unremarkable.   BILE DUCTS: No intrahepatic or extrahepatic biliary ductal dilatation is seen.   GALLBLADDER: Cholelithiasis is observed with some calcification of the gallbladder wall demonstrated as well. No gallbladder wall thickening or pericholecystic fluid is evident.   PANCREAS: Unremarkable   SPLEEN: Unremarkable   ADRENAL GLANDS: Unremarkable   KIDNEYS AND URETERS: Kidneys are small in size with extensive renal artery calcification demonstrated.   PELVIS:   BLADDER: Amos catheter is present within the decompressed urinary bladder.   REPRODUCTIVE ORGANS:  Calcification of the arcuate arteries within the uterus is seen. There is no uterine enlargement or adnexal mass.   BOWEL: A rectal balloon is seen. There is no abnormal distention of the intestinal tract.   VESSELS: There is atherosclerosis of the thoracoabdominal aorta and iliac arteries with calcification in the walls of the celiac, splenic, hepatic, and mesenteric arteries.   PERITONEUM/RETROPERITONEUM/LYMPH NODES: There is a minimal amount of ascites seen about the liver and spleen with mild presacral edema noted.   ABDOMINAL WALL: There is anasarca involving the soft tissues of the abdomen and pelvis. Postoperative change from ventral hernia repair is seen.   BONES: Bilateral sacroiliitis is seen. There is lumbar dextroscoliosis. Chronic rotator cuff tear is present bilaterally with osteoarthritis of both shoulders, right greater than left.       Chest 1.  Moderately small bilateral pleural effusions with left upper lobe atelectasis and compressive atelectasis seen posteriorly in right lower lobe. A left lower lobe pneumonia is identified. There is extensive airspace consolidation within left lower lobe with air bronchograms observed. Mild mediastinal reactive adenopathy is present as well.   Abdomen-Pelvis 1.  Cholelithiasis without evidence of cholecystitis. 2. Small amount of ascites with mild presacral edema and anasarca within the soft tissues of the abdominal wall. 3. Renal atrophy with extensive vascular calcifications.     MACRO: None   Signed by: Maria Garcia 1/21/2024 8:30 AM Dictation workstation:   [id number]    CT head wo IV contrast    Result Date: 1/21/2024  Interpreted By:  Maria Garcia, STUDY: CT HEAD WO IV CONTRAST 1/20/2024 11:42 pm   INDICATION: Signs/Symptoms:mental status changes   COMPARISON: 12/11/2023   ACCESSION NUMBER(S): [id number]   ORDERING CLINICIAN: BAN GIFFORD   TECHNIQUE: Unenhanced axial images of the brain are completed.   All CT examinations are performed with 1 or more of  the following dose reduction techniques: Automated exposure control, adjustment of mA and/or kv according to patient's size, or use of iterative reconstruction techniques.   FINDINGS: Helical unenhanced axial images of the brain demonstrate a mild to moderate degree of ventricular enlargement with proportionate widening of the sulci and sylvian fissures. There is no midline shift, mass effect, extra-axial fluid collection, or acute intracranial hemorrhage. There is diminished density seen in the periventricular white matter indicating chronic microvascular ischemic disease. There is calcified plaque seen within the distal vertebral and internal carotid arteries bilaterally. No calvarial abnormality is seen.       Atrophy and chronic microvascular ischemic disease without acute intracranial process.   Signed by: Maria Garcia 1/21/2024 8:09 AM Dictation workstation:   [id number]    XR chest 1 view    Result Date: 1/20/2024  Interpreted By:  Brendon Perez, STUDY: XR CHEST 1 VIEW; 1/20/2024 5:03 pm   INDICATION: CLINICAL INFORMATION: Signs/Symptoms:Insertion right IJ central line, also left thoracentesis.   COMPARISON: 01/19/2024 at 1338 hours   ACCESSION NUMBER(S): [id number]   ORDERING CLINICIAN: BOZENA ANTONIO   TECHNIQUE: Portable chest one view.   FINDINGS: The cardiac size is indeterminate in view of the AP projection. There is no change in the right-sided dual port central venous catheter. There is a new right internal jugular venous catheter present with the tip at approximately same level as the dual port central venous catheter, overlying the mid to lower right atrium. There is no evidence for pneumothorax. Patient has a history of left thoracentesis without evidence for pneumothorax on the left. There is bilateral basilar alveolar infiltrate and effusions. Left effusion is decreased compared to the study from 1 day earlier.   Surgical clips are identified within left chest wall. Endovascular stent is  identified in the distribution of the left subclavian artery.       1. Status post right-sided central venous catheter placement as described above with the tip overlying the mid to caudal aspect of the right atrium. There is no evidence for pneumothorax. 2. No evidence for left-sided pneumothorax after left-sided thoracentesis. Decrease in the left effusion. 3. Bilateral basilar infiltrates and effusions are present. Follow-up to assure complete clearing is suggested.   MACRO: none   Signed by: Brendon Perez 1/20/2024 5:11 PM Dictation workstation:   [id number]    XR chest 1 view    Result Date: 1/19/2024  Interpreted By:  Real Mendieta, STUDY: XR CHEST 1 VIEW; 1/19/2024 1:38 pm   INDICATION: Signs/Symptoms:dyspnea.   COMPARISON: 12/26/2023   ACCESSION NUMBER(S): [id number]   ORDERING CLINICIAN: EMELY GOLDSMITH   TECHNIQUE: 1 view of the chest was performed.   FINDINGS: There may be a minimal right pleural effusion. Slight prominence of the interstitium otherwise the right lung is clear. Improved appearance of the left lung with improved aeration of the right upper lobe. There is opacification of the left lower lobe possibly due to large pleural effusion which is similar to the prior study. No pneumothorax. Right-sided dual lumen central line catheter with tip at the proximal atrium. The cardiomediastinal silhouette is within normal limits. Left-sided subclavian stents are again noted.       Improved aeration and appearance of the left lung superiorly however there is a persistent large left pleural effusion and opacification of the left lower lobe.   Signed by: Real Mendieta 1/19/2024 1:44 PM Dictation workstation:   [id number]     Assessment/Plan     Septic shock-on pressors-remains on pressors  Left-sided pleural effusion   Left lower lobe pneumonia-treated  Proteus urinary tract infection-treated  History of MRSA endocarditis  History of C. 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

## 2024-02-12 NOTE — PROGRESS NOTES
Ayanna Gross is a 73 y.o. female on day 24 of admission presenting with Septic shock (CMS/HCC).    Subjective   Interval History:    patient seen and examined  Nurse present  Awake, alert  On low-dose pressors  Afebrile  Review of Systems   All other systems reviewed and are negative.      Objective   Range of Vitals (last 24 hours)  Heart Rate:  []   Temp:  [36.3 °C (97.3 °F)-37 °C (98.6 °F)]   Resp:  [13-31]   BP: ()/(23-79)   Weight:  [89.6 kg (197 lb 8.5 oz)]   SpO2:  [89 %-97 %]   Daily Weight  02/12/24 : 89.6 kg (197 lb 8.5 oz)    Body mass index is 36.13 kg/m².    Physical Exam  Constitutional:       Appearance: Awake, alert  HENT:      Head: Normocephalic and atraumatic.      Nose: Nose normal.   Eyes:      Extraocular Movements: Extraocular movements intact.      Conjunctiva/sclera: Conjunctivae normal.   Cardiovascular:      Heart sounds: Normal heart sounds, S1 normal and S2 normal.   Pulmonary:      Breath sounds: Decreased breath sounds present.   Abdominal:      General: Bowel sounds are normal.      Palpations: Abdomen is soft.   Musculoskeletal:      Cervical back: Normal range of motion and neck supple.   Skin:     Comments: Stage III sacral ulcer, bilateral posterior heel eschar -photos examined  Neurological:      Mental Status: She is awake, alert    Antibiotics  aspirin tablet 325 mg  acetaminophen (Tylenol) tablet 650 mg  cefepime (Maxipime) 1 g in dextrose 5 % 50 mL IV  sodium chloride 0.9 % bolus 2,229 mL  apixaban (Eliquis) tablet 2.5 mg  escitalopram (Lexapro) tablet 10 mg  febuxostat (Uloric) tablet 40 mg  fenofibrate (Triglide) tablet 160 mg  gabapentin (Neurontin) capsule 100 mg  midodrine (Proamatine) tablet 15 mg  nystatin (Mycostatin) 100,000 unit/gram powder  oxyCODONE-acetaminophen (Percocet) 5-325 mg per tablet 1 tablet  simvastatin (Zocor) tablet 20 mg  piperacillin-tazobactam-dextrose (Zosyn) IV 2.25 g  vancomycin (Vancocin) capsule 125 mg  oxygen (O2)  therapy  dextrose 50 % injection 25 g  glucagon (Glucagen) injection 1 mg  dextrose 10 % in water (D10W) infusion  insulin lispro (HumaLOG) injection 0-10 Units  nystatin (Mycostatin) 100,000 unit/gram powder 1 Application  vancomycin-diluent combo no.1 (Xellia) IVPB 1,750 mg  piperacillin-tazobactam-dextrose (Zosyn) IV 2.25 g  sodium chloride 0.9 % bolus 500 mL  norepinephrine (Levophed) 8 mg in dextrose 5% 250 mL (0.032 mg/mL) infusion (premix)  vancomycin (Vancocin) placeholder  pantoprazole (ProtoNix) EC tablet 40 mg  pantoprazole (ProtoNix) injection 40 mg  sennosides-docusate sodium (Judy-Colace) 8.6-50 mg per tablet 1 tablet  doxycycline (Vibramycin) in dextrose 5 % in water (D5W) 100 mL  mg  LORazepam (Ativan) injection 2 mg  magnesium sulfate IV 2 g  zinc oxide 20 % ointment 1 Application  nystatin (Mycostatin) cream  norepinephrine (Levophed) 8 mg in dextrose 5% 250 mL (0.032 mg/mL) infusion (premix)  heparin 1,000 unit/mL injection 2,000 Units  heparin 1,000 unit/mL injection 2,000 Units  nystatin (Mycostatin) ointment  acetaminophen (Tylenol) oral liquid 1,000 mg  albumin human 25 % solution 12.5 g  perflutren lipid microspheres (Definity) injection 0.5-10 mL of dilution  sulfur hexafluoride microsphr (Lumason) injection 24.28 mg  perflutren protein A microsphere (Optison) injection 0.5 mL  ipratropium-albuteroL (Duo-Neb) 0.5-2.5 mg/3 mL nebulizer solution 3 mL  sodium chloride 3 % nebulizer solution 3 mL  vancomycin-diluent combo no.1 (Xellia) IVPB 750 mg  sennosides-docusate sodium (Judy-Colace) 8.6-50 mg per tablet 1 tablet  acetaminophen (Tylenol) tablet 650 mg  doxycycline (Vibramycin) capsule 100 mg  magnesium oxide (Mag-Ox) tablet 400 mg  vasopressin (Vasostrict) 0.2 unit/mL infusion  norepinephrine (Levophed) 8 mg in dextrose 5% 250 mL (0.032 mg/mL) infusion (premix)  heparin 1,000 unit/mL injection 2,000 Units  heparin 1,000 unit/mL injection 2,000 Units  albumin human 25 % solution 12.5  g  vancomycin (Xellia) 1 g in 200 mL (Xellia) IVPB 1 g  ipratropium-albuteroL (Duo-Neb) 0.5-2.5 mg/3 mL nebulizer solution 3 mL  sodium chloride 3 % nebulizer solution 3 mL  albumin human 5 % infusion 12.5 g  heparin 1,000 unit/mL injection 2,000 Units  heparin 1,000 unit/mL injection 2,000 Units  vancomycin (Vancocin) capsule 125 mg  albumin human 25 % solution 12.5 g  heparin 1,000 unit/mL injection 2,000 Units  heparin 1,000 unit/mL injection 2,000 Units  ipratropium-albuteroL (Duo-Neb) 0.5-2.5 mg/3 mL nebulizer solution 3 mL  lidocaine (Xylocaine) 10 mg/mL (1 %) injection 50 mg  sodium phosphate 15 mmol in sodium chloride 0.9% 250 mL IV  sod phos di, mono-K phos mono (K Phos Neutral) tablet 250 mg  potassium, sodium phosphates (Phos-NaK) 280-160-250 mg packet 1 packet  doxycycline (Vibramycin) capsule 100 mg  fludrocortisone (Florinef) tablet 0.1 mg  gabapentin (Neurontin) capsule 100 mg  acetaminophen (Tylenol) tablet 1,000 mg  benzocaine-menthoL (Dermoplast) topical spray  vancomycin (Vancocin) capsule 125 mg  heparin 1,000 unit/mL injection 2,000 Units  heparin 1,000 unit/mL injection 2,000 Units  magnesium sulfate IV 2 g  albumin human 25 % solution 12.5 g  promethazine (Phenergan) injection 12.5 mg  ondansetron (Zofran) injection 4 mg  lidocaine (Xylocaine) 10 mg/mL (1 %) injection 50 mg  alteplase (Cathflo Activase) injection 2 mg  acetaminophen (Tylenol) tablet 650 mg  psyllium (Metamucil) 3.4 gram packet 1 packet  perflutren lipid microspheres (Definity) injection 0.5-10 mL of dilution  sulfur hexafluoride microsphr (Lumason) injection 24.28 mg  perflutren protein A microsphere (Optison) injection 0.5 mL  epoetin di-epbx (Retacrit) injection 10,000 Units  sulfur hexafluoride microsphr (Lumason) injection 24.28 mg  perflutren lipid microspheres (Definity) injection 0.5-10 mL of dilution  perflutren lipid microspheres (Definity) injection 0.5-10 mL of dilution  sulfur hexafluoride microsphr (Lumason)  injection 24.28 mg  perflutren protein A microsphere (Optison) injection 0.5 mL  ammonium lactate (Lac-Hydrin) 12 % lotion 1 Application  norepinephrine (Levophed) 8 mg in dextrose 5% 250 mL (0.032 mg/mL) infusion (premix)  fludrocortisone (Florinef) tablet 0.1 mg  albumin human 25 % solution 25 g  albumin human 25 % solution 25 g  meropenem (Merrem) 500 mg in sodium chloride 0.9 % 100 mL IV  potassium chloride 20 mEq in 100 mL IV premix  potassium chloride 20 mEq in 100 mL IV premix      Relevant Results  Labs  Results from last 72 hours   Lab Units 02/12/24 0523 02/11/24 0423 02/10/24  0452   WBC AUTO x10*3/uL 10.3 11.0 10.5   HEMOGLOBIN g/dL 8.2* 7.9* 8.0*   HEMATOCRIT % 26.5* 25.4* 25.5*   PLATELETS AUTO x10*3/uL 173 228 199     Results from last 72 hours   Lab Units 02/12/24  0523 02/11/24  0423 02/10/24  0452   SODIUM mmol/L 136 131* 132*   POTASSIUM mmol/L 3.4 3.3* 3.4   CHLORIDE mmol/L 102 99 98   CO2 mmol/L 24 20* 22*   BUN mg/dL 9 16 12   CREATININE mg/dL 1.60 2.70* 2.00*   GLUCOSE mg/dL 166* 190* 230*   CALCIUM mg/dL 8.4* 8.6 8.6   ANION GAP mmol/L 10 12 12   EGFR mL/min/1.73m*2 34* 18* 26*         Estimated Creatinine Clearance: 33.6 mL/min (by C-G formula based on SCr of 1.6 mg/dL).  C-Reactive Protein   Date Value Ref Range Status   12/25/2023 5.20 (H) 0.00 - 2.00 mg/dL Final     CRP   Date Value Ref Range Status   06/23/2023 19.9 (H) 0 - 2.0 MG/DL Final     Comment:     Performed at [address] [address]   05/22/2022 1.0 0 - 2.0 MG/DL Final     Comment:     Performed at [address] [address]     Microbiology  Susceptibility data from last 14 days.  Collected Specimen Info Organism   02/10/24 Tissue/Biopsy from Wound/Tissue Klebsiella pneumoniae/variicola     Pseudomonas aeruginosa     Imaging  Transthoracic Echo (TTE) Limited    Result Date: 2/7/2024           [address] [address]            Phone  [phone] TRANSTHORACIC ECHOCARDIOGRAM REPORT  Patient Name:      BASIA VEGA      Isaac Physician:    [id number] Herminio Lemus DO Study Date:        2/7/2024            Ordering Provider:    [id number] HERMINIO PINO                                                              ANNETTA MRN/PID:           [id number]            Fellow: Accession#:        [id number]        Nurse: Date of Birth/Age: 1950 / 73 years Sonographer:          Rena Talavera ACS,                                                              RDCS, FASE Gender:            F                   Additional Staff: Height:            157.48 cm           Admit Date: Weight:                                Admission Status:     Inpatient - Routine BSA:               m2                  Department Location:  [facility] Blood Pressure: 95 /42 mmHg Study Type:    TRANSTHORACIC ECHO (TTE) LIMITED Diagnosis/ICD: Hypotension, unspecified-I95.9; Mitral valve disorder-I05.9 Indication:    Limited to assess for MS, Hypotension CPT Codes:     Echo Limited-47367; Color Doppler-88889; Doppler Limited-98534 Patient History: Pertinent History: Hypotension, hx of MV endocarditis, PAD, DM2 ESRD on Hd. Study Detail: The following Echo studies were performed: 2D, M-Mode, Doppler and               color flow. Technically challenging study due to poor acoustic               windows and L Breast implant. Unable to obtain suprasternal notch               view.  PHYSICIAN INTERPRETATION: Left Ventricle: Left ventricular systolic function is normal. There are no regional wall motion abnormalities. The left ventricular cavity size is normal. Left ventricular diastolic filling was not assessed. Left Atrium: The left atrium is moderate to severely dilated. Right Ventricle: The right ventricle is normal in size. There is normal right ventricular global  systolic function. Right Atrium: The right atrium was not well visualized. Aortic Valve: The aortic valve was not assessed. Aortic valve regurgitation was not assessed. Mitral Valve: The mitral valve is abnormal. There is evidence of mild mitral valve stenosis. The doppler estimated mean and peak diastolic pressure gradients are 4.0 mmHg and 6.6 mmHg respectively. There is severe mitral annular calcification. There is no evidence of mitral valve regurgitation. Tricuspid Valve: The tricuspid valve was not assessed. Tricuspid regurgitation was not assessed. Pulmonic Valve: The pulmonic valve is not well visualized. The pulmonic valve regurgitation was not assessed. Pericardium: There is no pericardial effusion noted. Aorta: The aortic root was not assessed.  CONCLUSIONS:  1. Left ventricular systolic function is normal.  2. The left atrium is moderate to severely dilated.  3. There is severe mitral annular calcification. QUANTITATIVE DATA SUMMARY: LV DIASTOLIC FUNCTION:                        Normal Ranges: MV Peak E:    1.23 m/s (0.7-1.2 m/s) MV Peak A:    1.12 m/s (0.42-0.7 m/s) E/A Ratio:    1.10     (1.0-2.2) MV lateral e' 0.04 m/s MV medial e'  0.07 m/s MITRAL VALVE:                      Normal Ranges: MV Vmax:    1.28 m/s (<=1.3m/s) MV peak P.6 mmHg (<5mmHg) MV mean P.0 mmHg (<48mmHg)  RIGHT VENTRICLE: RV Basal 2.97 cm  [id number] Herminio Lemus DO Electronically signed on 2024 at 3:50:21 PM  ** Final **     Transthoracic Echo (TTE) Limited    Result Date: 2024           [address] [address]            Phone [phone] TRANSTHORACIC ECHOCARDIOGRAM REPORT  Patient Name:      BASIA Gray Physician:    [id number] Herminio Lemus DO Study Date:        2024            Ordering Provider:    [id number] REBECA RESENDEZ MRN/PID:            [id number]            Fellow: Accession#:        [id number]        Nurse: Date of Birth/Age: 1950 / 73 years Sonographer:          Jennifer WALL Gender:            F                   Additional Staff: Height:            157.48 cm           Admit Date:           2/6/2024 Weight:            98.88 kg            Admission Status:     Inpatient - Routine BSA:               1.98 m2             Department Location: Blood Pressure: 59 /49 mmHg Study Type:    TRANSTHORACIC ECHO (TTE) LIMITED Diagnosis/ICD: Acute on chronic diastolic (congestive) heart failure                (CHF)-I50.33 Indication:    Acute on chronic diastolic congestive heart failure CPT Codes:     Echo Limited-14358; Color Doppler-54326; Doppler Limited-88856 Patient History: Pertinent History: PAF Peripheralvascular disease low blood pressure HTN Breast                    Cancer Mixed Hyperlipidemia HF CVA Hypotension DMII CAD CKD. Study Detail: The following Echo studies were performed: 2D, M-Mode, Doppler and               color flow. Technically challenging study due to body habitus,               patient lying in supine position, poor acoustic windows, prominent               lung artifact and Breast Prosthesis. Definity used as a contrast               agent for endocardial border definition. Unable to obtain               suprasternal notch view.  PHYSICIAN INTERPRETATION: Left Ventricle: Left ventricular systolic function is normal, with an estimated ejection fraction of 60-65%. There are no regional wall motion abnormalities. The left ventricular cavity size is normal. Left ventricular diastolic filling was indeterminate. Left Atrium: The left atrium is moderately dilated. Right Ventricle: The right ventricle is normal in size. There is normal right ventricular global systolic function. Right Atrium: The right atrium is normal in size. Aortic Valve: The aortic valve is trileaflet. There is no evidence of aortic valve  regurgitation. The peak instantaneous gradient of the aortic valve is 5.3 mmHg. Mitral Valve: The mitral valve is normal in structure. There is severe mitral annular calcification. There is trace mitral valve regurgitation. Tricuspid Valve: The tricuspid valve is structurally normal. There is mild tricuspid regurgitation. Pulmonic Valve: The pulmonic valve is not well visualized. The pulmonic valve regurgitation was not well visualized. Pericardium: There is no pericardial effusion noted. Aorta: The aortic root is normal.  CONCLUSIONS:  1. Left ventricular systolic function is normal with a 60-65% estimated ejection fraction.  2. The left atrium is moderately dilated.  3. There is severe mitral annular calcification. QUANTITATIVE DATA SUMMARY: 2D MEASUREMENTS:                          Normal Ranges: LAs:           3.80 cm   (2.7-4.0cm) IVSd:          0.92 cm   (0.6-1.1cm) LVPWd:         1.09 cm   (0.6-1.1cm) LVIDd:         2.98 cm   (3.9-5.9cm) LVIDs:         2.12 cm LV Mass Index: 41.2 g/m2 LV % FS        28.9 % LA VOLUME:                               Normal Ranges: LA Vol A4C:        58.3 ml    (22+/-6mL/m2) LA Vol A2C:        60.9 ml LA Vol BP:         62.1 ml LA Vol Index A4C:  29.4ml/m2 LA Vol Index A2C:  30.7 ml/m2 LA Vol Index BP:   31.3 ml/m2 LA Area A4C:       20.5 cm2 LA Area A2C:       20.1 cm2 LA Major Axis A4C: 6.1 cm LA Major Axis A2C: 5.6 cm LA Volume Index:   29.3 ml/m2 LA Vol A4C:        53.1 ml LA Vol A2C:        58.4 ml LV SYSTOLIC FUNCTION BY 2D PLANIMETRY (MOD):                     Normal Ranges: EF-A4C View: 68.2 % (>=55%) EF-A2C View: 65.9 % EF-Biplane:  67.8 % AORTIC VALVE:                         Normal Ranges: AoV Vmax:      1.15 m/s (<=1.7m/s) AoV Peak P.3 mmHg (<20mmHg) LVOT Max Shimon:  1.11 m/s (<=1.1m/s) LVOT VTI:      16.20 cm LVOT Diameter: 1.90 cm  (1.8-2.4cm) AoV Area,Vmax: 2.74 cm2 (2.5-4.5cm2) PULMONIC VALVE:                         Normal Ranges: PV Accel Time: 63 msec   (>120ms) PV Max Shimon:    1.0 m/s  (0.6-0.9m/s) PV Max P.3 mmHg  [id number] Herminio Lemus  Electronically signed on 2024 at 7:58:25 AM  ** Final **     Upper extremity venous duplex bilateral    Result Date: 2024           [facility] [id number] [place], [place] [address]            Phone [phone]  Vascular Lab Report  [facility] US UPPER EXTREMITY VENOUS DUPLEX BILATERAL Patient Name:      BASIA Gray Physician:  [id number] Mini Busby MD, RPVI Study Date:        2024           Ordering Provider:  [id number] ERI BERMAN MRN/PID:           [id number]            Fellow: Accession#:        [id number]        Technologist:       Dara Parr RVRICHIE Date of Birth/Age: 1950 / 73 years Technologist 2: Gender:            F                   Encounter#:         [id number] Admission Status:  Inpatient           Location Performed: [facility]  Diagnosis/ICD: Right arm swelling-M79.89; Left arm swelling-M79.89 CPT Codes:     40892 Peripheral venous duplex scan for DVT complete  CONCLUSIONS:  Right Upper Venous: No evidence of acute deep vein thrombus visualized in the right upper extremity. Internal jugular vein was visualized in segments due to IV lines and bandages. Left Upper Venous: No evidence of acute deep vein thrombus visualized in the left upper extremity. Subclavian stent is noted and appears patent. There is a known occluded dialysis access noted.  Additional Findings: Technically difficult exam due to IV lines, bandages, and patient's positioning.  Imaging & Doppler Findings:  Right               Compressible Thrombus        Flow Internal Jugular        Yes        None   Spontaneous/Phasic Subclavian              Yes        None Subclavian Proximal     Yes        None   Spontaneous/Phasic Subclavian Mid          Yes        None Subclavian Distal       Yes        None   Spontaneous/Phasic Axillary                 Yes        None       Pulsatile Brachial                Yes        None Cephalic                Yes        None Basilic                 Yes        None  Left                Compress Thrombus        Flow Internal Jugular      Yes      None       Pulsatile Subclavian            Yes      None Subclavian Proximal   Yes      None       Pulsatile Subclavian Mid        Yes      None       Pulsatile Subclavian Distal     Yes      None       Pulsatile Axillary              Yes      None   Spontaneous/Phasic Brachial              Yes      None Cephalic              Yes      None Basilic               Yes      None  [id number] Mini Busby MD, RPVI Electronically signed by [id number] Mini Busby MD, RPVI on 1/25/2024 at 4:06:13 PM  ** Final **     ECG 12 lead    Result Date: 1/24/2024  Sinus tachycardia  with 1st degree AV block Right bundle branch block Possible Lateral infarct , age undetermined Abnormal ECG Confirmed by Yseika Nj ([id number]) on 1/24/2024 6:54:45 AM    XR tibia fibula right 2 views    Result Date: 1/22/2024  Interpreted By:  Real Mendieta, STUDY: XR TIBIA FIBULA RIGHT 2 VIEWS; 1/22/2024 2:15 pm   INDICATION: Signs/Symptoms:evaluate source of infection, osteomyelitis;   COMPARISON: None available.   ACCESSION NUMBER(S): [id number]   ORDERING CLINICIAN: BAN GIFFORD   TECHNIQUE: Views: AP and Lateral of the right tibia and fibula   FINDINGS: RESULT: There is no evidence for fracture or dislocation. Tricompartmental degenerative changes of the right knee. The tibia and fibula appear intact without bony erosion or evidence for osteomyelitis. No other bony or soft tissue abnormality is identified. No subcutaneous gas is noted.       No evidence for acute osseous abnormality. No bony erosion to suggest osteomyelitis.   Signed by: Real Mendieta 1/22/2024 3:15 PM Dictation workstation:   [id number]    XR ankle right 3+ views    Result Date: 1/22/2024  Interpreted By:  Real Mendieta, STUDY: XR ANKLE  RIGHT 3+ VIEWS; 1/22/2024 2:15 pm   INDICATION: Signs/Symptoms:Evuluate source of infection, osteomyelitis;   COMPARISON: None available.   ACCESSION NUMBER(S): [id number]   ORDERING CLINICIAN: BAN GIFFORD   TECHNIQUE: Views: AP, Lateral, Oblique, right ankle   FINDINGS: RESULT: There is no evidence for fracture or dislocation. The ankle mortise is intact. Joint spaces appear adequately maintained. No bony erosion to suggest osteomyelitis. No bone lesion or soft tissue abnormality is identified. No subcutaneous gas is seen.       No evidence for acute osseous abnormality. No bony erosion to suggest osteomyelitis.   Signed by: Real Mendieta 1/22/2024 3:14 PM Dictation workstation:   [id number]    Transthoracic Echo (TTE) Complete    Result Date: 1/22/2024           [place] [address]            Phone [phone] TRANSTHORACIC ECHOCARDIOGRAM REPORT  Patient Name:      BASIA Gray Physician:   [id number] [facility] Study Date:        1/22/2024           Ordering Provider:   [id number] ERI BERMAN MRN/PID:           [id number]            Fellow: Accession#:        [id number]        Nurse: Date of Birth/Age: 1950 / 73 years Sonographer:         Tabatha Page RDCS Gender:            F                   Additional Staff: Height:            157.00 cm           Admit Date: Weight:            75.00 kg            Admission Status:    Inpatient - Routine BSA:               1.76 m2             Department Location: [facility] Blood Pressure: 88 /49 mmHg Study Type:    TRANSTHORACIC ECHO (TTE) COMPLETE Diagnosis/ICD: Chronic combined systolic (congestive) and diastolic (congestive)                heart failure (CHF)-I50.42; Sepsis, unspecified organism-A41.9 Indication:    heart failure,septic shock CPT Codes:     Echo Complete w Full Doppler-53132 Patient History: BMI:               Obese >30  Pertinent History: Sepsis, PVD, A-Fib, CVA, Cancer and HTN. breast                    prosthesis/ca, ESRD,anemia,septic shock,heart failure. Study Detail: The following Echo studies were performed: 2D, M-Mode, Doppler and               color flow. Technically challenging study due to prosthesis,               prominent lung artifact and body habitus.  PHYSICIAN INTERPRETATION: Left Ventricle: Left ventricular systolic function is normal, with an estimated ejection fraction of 70%. There are no regional wall motion abnormalities. The left ventricular cavity size is normal. Left ventricular diastolic filling was indeterminate. Left Atrium: The left atrium is moderately dilated. Right Ventricle: The right ventricle is normal in size. There is normal right ventricular global systolic function. Right Atrium: The right atrium is normal in size. Aortic Valve: The aortic valve is trileaflet. There is no evidence of aortic valve regurgitation. The peak instantaneous gradient of the aortic valve is 2.8 mmHg. Mitral Valve: The mitral valve is normal in structure. There is no evidence of mitral valve regurgitation. Tricuspid Valve: The tricuspid valve is structurally normal. There is trace tricuspid regurgitation. Pulmonic Valve: The pulmonic valve is not well visualized. The pulmonic valve regurgitation was not well visualized. Pericardium: There is no pericardial effusion noted. Aorta: The aortic root is normal.  CONCLUSIONS:  1. Left ventricular systolic function is normal with a 70% estimated ejection fraction.  2. The left atrium is moderately dilated.  3. Left ventricular diastolic filling indeterminate. QUANTITATIVE DATA SUMMARY: 2D MEASUREMENTS:                          Normal Ranges: LAs:           4.50 cm   (2.7-4.0cm) IVSd:          0.61 cm   (0.6-1.1cm) LVPWd:         0.88 cm   (0.6-1.1cm) LVIDd:         3.66 cm   (3.9-5.9cm) LV Mass Index: 41.9 g/m2 LV SYSTOLIC FUNCTION BY 2D PLANIMETRY (MOD):                      Normal Ranges: EF-A4C View: 68.3 % (>=55%) LV DIASTOLIC FUNCTION:                     Normal Ranges: MV Peak E: 1.19 m/s (0.7-1.2 m/s) MV Peak A: 0.90 m/s (0.42-0.7 m/s) E/A Ratio: 1.32     (1.0-2.2) MITRAL VALVE:                 Normal Ranges: MV DT: 192 msec (150-240msec) AORTIC VALVE:                         Normal Ranges: AoV Vmax:      0.84 m/s (<=1.7m/s) AoV Peak P.8 mmHg (<20mmHg) LVOT Max Shimon:  0.47 m/s (<=1.1m/s) LVOT Diameter: 1.90 cm  (1.8-2.4cm) AoV Area,Vmax: 1.57 cm2 (2.5-4.5cm2) TRICUSPID VALVE/RVSP:                   Normal Ranges: IVC Diam: 1.05 cm  [id number] Fausto Andersonsa DENNIS Electronically signed on 2024 at 2:53:04 PM  ** Final **     XR foot left 3+ views    Result Date: 2024  Interpreted By:  Real Mendieta, STUDY: XR FOOT LEFT 1-2 VIEWS; 2024 4:15 pm   INDICATION: Signs/Symptoms:osteomyelitis. Pain   COMPARISON: 2023   ACCESSION NUMBER(S): [id number]   ORDERING CLINICIAN: BAN GIFFORD   TECHNIQUE: Views:  AP, Lat, Oblique, left foot   FINDINGS: RESULT: There is no evidence for fracture or dislocation. 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 Mild hammertoe deformities. Joint spaces appear adequately maintained. Soft tissue ulceration of the posterior aspect of the heel however no evidence for bony erosion to suggest osteomyelitis.       No evidence for acute fracture or acute bony erosion to suggest osteomyelitis. Chronic changes as described.   Signed by: Real Mendieta 2024 7:15 PM Dictation workstation:   [id number]    CT chest abdomen pelvis wo IV contrast    Result Date: 2024  Interpreted By:  Maria Garcia, STUDY: CT CHEST ABDOMEN PELVIS WO CONTRAST;  2024 11:42 pm   INDICATION: Mental status change. Elevated white blood cell count with infectious workup.   COMPARISON: 2023   ACCESSION NUMBER(S): [id number]   ORDERING CLINICIAN: BAN GIFFORD   TECHNIQUE: CT of the chest, abdomen and pelvis was performed with no  oral or intravenous contrast administered. Sagittal and coronal reformations were completed by the technologist at the acquisition scanner.   All CT examinations are performed with 1 or more of the following dose reduction techniques: Automated exposure control, adjustment of mA and/or kv according to patient's size, or use of iterative reconstruction techniques.   FINDINGS: Please note that the study is limited without intravenous contrast.   CHEST: Bilateral pleural effusions are present with right effusion moderately small in size and with the left effusion moderately small in size as well. There is shift of the heart and mediastinum to the left of midline due to atelectasis of the left upper lobe. There is consolidation throughout left lower lobe with air bronchograms noted. On the right, there is compressive atelectasis seen posteriorly in the right lower lobe.   There is mild reactive mediastinal adenopathy seen at this time with mediastinal lymph nodes increased in size since prior study. Several of these mediastinal nodes are at the upper limits of normal measuring 8 mm in short axis diameter.   No pericardial effusion is present. The cardiac size is normal with mitral annulus calcification.   There has been prior bilateral mastectomy with reconstruction of the left breast with implant placement. Surgical clips are visible within the left axilla. Stent grafts are visible within the left upper extremity and within the left innominate, subclavian as well as axillary veins.   A peripherally calcified 1.8 cm nodule arises from the lower pole of left thyroid lobe.   ABDOMEN:   LIVER: Unremarkable.   BILE DUCTS: No intrahepatic or extrahepatic biliary ductal dilatation is seen.   GALLBLADDER: Cholelithiasis is observed with some calcification of the gallbladder wall demonstrated as well. No gallbladder wall thickening or pericholecystic fluid is evident.   PANCREAS: Unremarkable   SPLEEN: Unremarkable   ADRENAL  GLANDS: Unremarkable   KIDNEYS AND URETERS: Kidneys are small in size with extensive renal artery calcification demonstrated.   PELVIS:   BLADDER: Amos catheter is present within the decompressed urinary bladder.   REPRODUCTIVE ORGANS: Calcification of the arcuate arteries within the uterus is seen. There is no uterine enlargement or adnexal mass.   BOWEL: A rectal balloon is seen. There is no abnormal distention of the intestinal tract.   VESSELS: There is atherosclerosis of the thoracoabdominal aorta and iliac arteries with calcification in the walls of the celiac, splenic, hepatic, and mesenteric arteries.   PERITONEUM/RETROPERITONEUM/LYMPH NODES: There is a minimal amount of ascites seen about the liver and spleen with mild presacral edema noted.   ABDOMINAL WALL: There is anasarca involving the soft tissues of the abdomen and pelvis. Postoperative change from ventral hernia repair is seen.   BONES: Bilateral sacroiliitis is seen. There is lumbar dextroscoliosis. Chronic rotator cuff tear is present bilaterally with osteoarthritis of both shoulders, right greater than left.       Chest 1.  Moderately small bilateral pleural effusions with left upper lobe atelectasis and compressive atelectasis seen posteriorly in right lower lobe. A left lower lobe pneumonia is identified. There is extensive airspace consolidation within left lower lobe with air bronchograms observed. Mild mediastinal reactive adenopathy is present as well.   Abdomen-Pelvis 1.  Cholelithiasis without evidence of cholecystitis. 2. Small amount of ascites with mild presacral edema and anasarca within the soft tissues of the abdominal wall. 3. Renal atrophy with extensive vascular calcifications.     MACRO: None   Signed by: Maria Garcia 1/21/2024 8:30 AM Dictation workstation:   [id number]    CT head wo IV contrast    Result Date: 1/21/2024  Interpreted By:  Maria Garcia, STUDY: CT HEAD WO IV CONTRAST 1/20/2024 11:42 pm   INDICATION:  Signs/Symptoms:mental status changes   COMPARISON: 12/11/2023   ACCESSION NUMBER(S): [id number]   ORDERING CLINICIAN: BAN GIFFORD   TECHNIQUE: Unenhanced axial images of the brain are completed.   All CT examinations are performed with 1 or more of the following dose reduction techniques: Automated exposure control, adjustment of mA and/or kv according to patient's size, or use of iterative reconstruction techniques.   FINDINGS: Helical unenhanced axial images of the brain demonstrate a mild to moderate degree of ventricular enlargement with proportionate widening of the sulci and sylvian fissures. There is no midline shift, mass effect, extra-axial fluid collection, or acute intracranial hemorrhage. There is diminished density seen in the periventricular white matter indicating chronic microvascular ischemic disease. There is calcified plaque seen within the distal vertebral and internal carotid arteries bilaterally. No calvarial abnormality is seen.       Atrophy and chronic microvascular ischemic disease without acute intracranial process.   Signed by: Maria Garcia 1/21/2024 8:09 AM Dictation workstation:   [id number]    XR chest 1 view    Result Date: 1/20/2024  Interpreted By:  Brendon Perez, STUDY: XR CHEST 1 VIEW; 1/20/2024 5:03 pm   INDICATION: CLINICAL INFORMATION: Signs/Symptoms:Insertion right IJ central line, also left thoracentesis.   COMPARISON: 01/19/2024 at 1338 hours   ACCESSION NUMBER(S): [id number]   ORDERING CLINICIAN: BOZENA ANTONIO   TECHNIQUE: Portable chest one view.   FINDINGS: The cardiac size is indeterminate in view of the AP projection. There is no change in the right-sided dual port central venous catheter. There is a new right internal jugular venous catheter present with the tip at approximately same level as the dual port central venous catheter, overlying the mid to lower right atrium. There is no evidence for pneumothorax. Patient has a history of left thoracentesis without  evidence for pneumothorax on the left. There is bilateral basilar alveolar infiltrate and effusions. Left effusion is decreased compared to the study from 1 day earlier.   Surgical clips are identified within left chest wall. Endovascular stent is identified in the distribution of the left subclavian artery.       1. Status post right-sided central venous catheter placement as described above with the tip overlying the mid to caudal aspect of the right atrium. There is no evidence for pneumothorax. 2. No evidence for left-sided pneumothorax after left-sided thoracentesis. Decrease in the left effusion. 3. Bilateral basilar infiltrates and effusions are present. Follow-up to assure complete clearing is suggested.   MACRO: none   Signed by: Brendon Preez 1/20/2024 5:11 PM Dictation workstation:   [id number]    XR chest 1 view    Result Date: 1/19/2024  Interpreted By:  Real Mendieta, STUDY: XR CHEST 1 VIEW; 1/19/2024 1:38 pm   INDICATION: Signs/Symptoms:dyspnea.   COMPARISON: 12/26/2023   ACCESSION NUMBER(S): [id number]   ORDERING CLINICIAN: EMELY GOLDSMITH   TECHNIQUE: 1 view of the chest was performed.   FINDINGS: There may be a minimal right pleural effusion. Slight prominence of the interstitium otherwise the right lung is clear. Improved appearance of the left lung with improved aeration of the right upper lobe. There is opacification of the left lower lobe possibly due to large pleural effusion which is similar to the prior study. No pneumothorax. Right-sided dual lumen central line catheter with tip at the proximal atrium. The cardiomediastinal silhouette is within normal limits. Left-sided subclavian stents are again noted.       Improved aeration and appearance of the left lung superiorly however there is a persistent large left pleural effusion and opacification of the left lower lobe.   Signed by: Real Mendieta 1/19/2024 1:44 PM Dictation workstation:   [id number]     Assessment/Plan   Septic  shock-on pressors-remains on pressors  Left-sided pleural effusion   Left lower lobe pneumonia-treated  Proteus urinary tract infection-treated  History of MRSA endocarditis  History of C. difficile infection  Infected bilateral heel ulcers-wound culture growing Pseudomonas, Klebsiella  Yjnczlbcpnxc-cqkyeemamwyqgu-[id number]  Type 2 diabetes with peripheral neuropathy with gangrene-Matson 3 versus 4  Severe malnutrition-prealbumin less than 3           Wean pressors as tolerated  IV meropenem  Follow-up wound cultures  Continue oral doxycycline-suppressive therapy  Continue oral vancomycin-patient at risk for relapse  Contact plus precautions-at risk for relapse   final recommendations based on culture results  Supportive care  Monitor temperature and WBC  Local care  Offloading           Stephen Coulter MD

## 2024-02-12 NOTE — NURSING NOTE
Tablo continues to run with no difficulties. Levophed infusing at 0.03mcg/kg/min. mentation remains stable since levo titration. FMS continues to be irrigated every 4 hours and has not leak since start of shift. Will continue to monitor closely

## 2024-02-12 NOTE — CARE PLAN
Pt is on Low dose Norepi; weaning off norepi at this time.  Phoned [facility] to confirm that pt was with them; per Fredy from [facility]; pt went to [facility] because of the onsite dialysis.  Insurance denies LTACH for this pt.  Need a discharge plan      DISCHARGE PLAN: PT DOES NOT YET HAVE A SAVE DISCHARGE PLAN

## 2024-02-12 NOTE — PROGRESS NOTES
CONSULT PROGRESS NOTES    SERVICE DATE: 2/12/2024   SERVICE TIME: 10:33 AM    CONSULTING SERVICE: Nephrology    ASSESSMENT AND PLAN   1.  End-stage renal disease  2.  Hypotension  3.  Hyponatremia  4.  Hypokalemia  5.  Anemia of chronic kidney disease     She underwent dialysis today with Tablo again with low temperature and using mild fluid removal.  Tolerated 1.5 L volume removal.  Remains on a low-dose of norepinephrine infusion.  Hyponatremia from volume overload in this patient with anuria.  Hypokalemia improved, now using 3K bath.  I would not replace the potassium, as the last sample was drawn shortly after dialysis and it is probably not reliable of her potassium homeostasis.  Hemoglobin is low, use Retacrit 10,000 units IV 3 times a week with dialysis.  She remains dependent on norepinephrine infusion otherwise her blood pressure drops dangerously low.  I have discussed this poor prognosis numerous times with the patient and her daughter.  The patient's goals remain to see her grandchildren is much as possible.  She does not seem ready for hospice care and withdrawal from dialysis.  LTAC placement was denied by insurance.  She has met with hospice, but did not desire to pursue their services.  She does not need, nor do I believe she would derive any benefit from CRRT.  Magnesium balance has improved.  None of her options for disposition are great.  She has a poor overall prognosis, but it has been difficult to get the patient and her daughter/family to understand this.  I have had numerous discussions with the patient about where to proceed from here, but her main goals continue to be to see her grandchildren as long as possible while she is still is without significant pain.  She remains full code, despite numerous attempts from ICU team, palliative care, and myself to redefine and be more realistic with her goals of care.    SUBJECTIVE  INTERVAL HPI: She feels okay.  She still on norepinephrine infusion.   She got a visit with numerous grandchildren this weekend.  There is no major diarrhea.  She has been commenced on even more broad-spectrum antibiotics with meropenem given Pseudomonas growing from one of her heel ulcers.    MEDICATIONS:  apixaban, 2.5 mg, oral, BID  doxycylcine, 100 mg, oral, Daily  epoetin di or biosimilar, 10,000 Units, intravenous, Every Mon/Wed/Fri  escitalopram, 10 mg, oral, Nightly  fludrocortisone, 0.1 mg, oral, TID  gabapentin, 100 mg, oral, TID  heparin, 2,000 Units, intra-catheter, After Dialysis  heparin, 2,000 Units, intra-catheter, After Dialysis  heparin, 2,000 Units, intra-catheter, After Dialysis  heparin, 2,000 Units, intra-catheter, After Dialysis  insulin lispro, 0-10 Units, subcutaneous, TID with meals  ipratropium-albuteroL, 3 mL, nebulization, q6h while awake  lidocaine, 5 mL, infiltration, Once  meropenem, 500 mg, intravenous, q12h  midodrine, 15 mg, oral, TID with meals  nystatin, , Topical, BID  nystatin, , Topical, BID  pantoprazole, 40 mg, oral, Daily   Or  pantoprazole, 40 mg, intravenous, Daily  perflutren lipid microspheres, 0.5-10 mL of dilution, intravenous, Once in imaging  perflutren protein A microsphere, 0.5 mL, intravenous, Once in imaging  psyllium, 1 packet, oral, BID  sodium chloride, 3 mL, nebulization, TID  sulfur hexafluoride microsphr, 2 mL, intravenous, Once in imaging  sulfur hexafluoride microsphr, 2 mL, intravenous, Once in imaging  vancomycin, 125 mg, oral, BID  zinc oxide, 1 Application, Topical, BID       norepinephrine, 0.01-3 mcg/kg/min, Last Rate: 0.02 mcg/kg/min (02/12/24 1000)       PRN medications: acetaminophen, alteplase, ammonium lactate, benzocaine-menthoL, dextrose 10 % in water (D10W), dextrose, glucagon, ondansetron, oxygen, sennosides-docusate sodium     OBJECTIVE  PHYSICAL EXAM:   Heart Rate:  []   Temp:  [36.3 °C (97.3 °F)-37 °C (98.6 °F)]   Resp:  [13-31]   BP: ()/(23-79)   Weight:  [89.6 kg (197 lb 8.5 oz)]   SpO2:   [90 %-97 %]   Body mass index is 36.13 kg/m².  Chronically ill-appearing elderly white woman  Pale skin  Right-sided hand swelling  Left-sided finger amputation  Internal jugular tunneled hemodialysis catheter in place  There is no significant pretibial edema on both lower extremities  Soft abdomen  No Amos  No obvious joint deformities  Moist mucosa  Hearing seems to be intact  Phonation intact  Rectal tube in place       DATA:   Labs:  Results for orders placed or performed during the hospital encounter of 01/19/24 (from the past 96 hour(s))   POCT GLUCOSE   Result Value Ref Range    POCT Glucose 201 (H) 74 - 99 mg/dL   POCT GLUCOSE   Result Value Ref Range    POCT Glucose 246 (H) 74 - 99 mg/dL   BLOOD GAS LACTIC ACID, VENOUS   Result Value Ref Range    POCT Lactate, Venous 2.1 (H) 0.4 - 2.0 mmol/L   Basic metabolic panel   Result Value Ref Range    Glucose 178 (H) 65 - 99 mg/dL    Sodium 130 (L) 133 - 145 mmol/L    Potassium 3.5 3.4 - 5.1 mmol/L    Chloride 98 97 - 107 mmol/L    Bicarbonate 22 (L) 24 - 31 mmol/L    Urea Nitrogen 17 8 - 25 mg/dL    Creatinine 2.70 (H) 0.40 - 1.60 mg/dL    eGFR 18 (L) >60 mL/min/1.73m*2    Calcium 8.0 (L) 8.5 - 10.4 mg/dL    Anion Gap 10 <=19 mmol/L   Blood Gas Lactic Acid, Venous   Result Value Ref Range    POCT Lactate, Venous 2.0 0.4 - 2.0 mmol/L   POCT GLUCOSE   Result Value Ref Range    POCT Glucose 156 (H) 74 - 99 mg/dL   CBC   Result Value Ref Range    WBC 14.5 (H) 4.4 - 11.3 x10*3/uL    nRBC 0.0 0.0 - 0.0 /100 WBCs    RBC 2.79 (L) 4.00 - 5.20 x10*6/uL    Hemoglobin 8.6 (L) 12.0 - 16.0 g/dL    Hematocrit 27.6 (L) 36.0 - 46.0 %    MCV 99 80 - 100 fL    MCH 30.8 26.0 - 34.0 pg    MCHC 31.2 (L) 32.0 - 36.0 g/dL    RDW 22.7 (H) 11.5 - 14.5 %    Platelets 239 150 - 450 x10*3/uL   POCT GLUCOSE   Result Value Ref Range    POCT Glucose 172 (H) 74 - 99 mg/dL   POCT GLUCOSE   Result Value Ref Range    POCT Glucose 174 (H) 74 - 99 mg/dL   POCT GLUCOSE   Result Value Ref Range    POCT  Glucose 156 (H) 74 - 99 mg/dL   POCT GLUCOSE   Result Value Ref Range    POCT Glucose 217 (H) 74 - 99 mg/dL   CBC   Result Value Ref Range    WBC 10.5 4.4 - 11.3 x10*3/uL    nRBC 0.0 0.0 - 0.0 /100 WBCs    RBC 2.56 (L) 4.00 - 5.20 x10*6/uL    Hemoglobin 8.0 (L) 12.0 - 16.0 g/dL    Hematocrit 25.5 (L) 36.0 - 46.0 %     80 - 100 fL    MCH 31.3 26.0 - 34.0 pg    MCHC 31.4 (L) 32.0 - 36.0 g/dL    RDW 23.1 (H) 11.5 - 14.5 %    Platelets 199 150 - 450 x10*3/uL   Basic metabolic panel   Result Value Ref Range    Glucose 230 (H) 65 - 99 mg/dL    Sodium 132 (L) 133 - 145 mmol/L    Potassium 3.4 3.4 - 5.1 mmol/L    Chloride 98 97 - 107 mmol/L    Bicarbonate 22 (L) 24 - 31 mmol/L    Urea Nitrogen 12 8 - 25 mg/dL    Creatinine 2.00 (H) 0.40 - 1.60 mg/dL    eGFR 26 (L) >60 mL/min/1.73m*2    Calcium 8.6 8.5 - 10.4 mg/dL    Anion Gap 12 <=19 mmol/L   POCT GLUCOSE   Result Value Ref Range    POCT Glucose 191 (H) 74 - 99 mg/dL   POCT GLUCOSE   Result Value Ref Range    POCT Glucose 178 (H) 74 - 99 mg/dL   Tissue/Wound Culture/Smear    Specimen: Wound/Tissue; Tissue/Biopsy   Result Value Ref Range    Tissue/Wound Culture/Smear (4+) Abundant Pseudomonas aeruginosa (A)     Tissue/Wound Culture/Smear (3+) Moderate Klebsiella pneumoniae/variicola (A)     Gram Stain (2+) Few Polymorphonuclear leukocytes (A)     Gram Stain (2+) Few Gram negative bacilli (A)    Tissue/Wound Culture/Smear    Specimen: Other (specify in comments); Tissue/Biopsy   Result Value Ref Range    Tissue/Wound Culture/Smear Culture in progress     Gram Stain (4+) Abundant Polymorphonuclear leukocytes (A)     Gram Stain (3+) Moderate Gram negative bacilli (A)    POCT GLUCOSE   Result Value Ref Range    POCT Glucose 197 (H) 74 - 99 mg/dL   POCT GLUCOSE   Result Value Ref Range    POCT Glucose 180 (H) 74 - 99 mg/dL   CBC   Result Value Ref Range    WBC 11.0 4.4 - 11.3 x10*3/uL    nRBC 0.0 0.0 - 0.0 /100 WBCs    RBC 2.52 (L) 4.00 - 5.20 x10*6/uL    Hemoglobin 7.9  (L) 12.0 - 16.0 g/dL    Hematocrit 25.4 (L) 36.0 - 46.0 %     (H) 80 - 100 fL    MCH 31.3 26.0 - 34.0 pg    MCHC 31.1 (L) 32.0 - 36.0 g/dL    RDW 22.8 (H) 11.5 - 14.5 %    Platelets 228 150 - 450 x10*3/uL   Basic metabolic panel   Result Value Ref Range    Glucose 190 (H) 65 - 99 mg/dL    Sodium 131 (L) 133 - 145 mmol/L    Potassium 3.3 (L) 3.4 - 5.1 mmol/L    Chloride 99 97 - 107 mmol/L    Bicarbonate 20 (L) 24 - 31 mmol/L    Urea Nitrogen 16 8 - 25 mg/dL    Creatinine 2.70 (H) 0.40 - 1.60 mg/dL    eGFR 18 (L) >60 mL/min/1.73m*2    Calcium 8.6 8.5 - 10.4 mg/dL    Anion Gap 12 <=19 mmol/L   POCT GLUCOSE   Result Value Ref Range    POCT Glucose 165 (H) 74 - 99 mg/dL   POCT GLUCOSE   Result Value Ref Range    POCT Glucose 158 (H) 74 - 99 mg/dL   POCT GLUCOSE   Result Value Ref Range    POCT Glucose 118 (H) 74 - 99 mg/dL   POCT GLUCOSE   Result Value Ref Range    POCT Glucose 190 (H) 74 - 99 mg/dL   Basic Metabolic Panel   Result Value Ref Range    Glucose 166 (H) 65 - 99 mg/dL    Sodium 136 133 - 145 mmol/L    Potassium 3.4 3.4 - 5.1 mmol/L    Chloride 102 97 - 107 mmol/L    Bicarbonate 24 24 - 31 mmol/L    Urea Nitrogen 9 8 - 25 mg/dL    Creatinine 1.60 0.40 - 1.60 mg/dL    eGFR 34 (L) >60 mL/min/1.73m*2    Calcium 8.4 (L) 8.5 - 10.4 mg/dL    Anion Gap 10 <=19 mmol/L   CBC   Result Value Ref Range    WBC 10.3 4.4 - 11.3 x10*3/uL    nRBC 0.0 0.0 - 0.0 /100 WBCs    RBC 2.64 (L) 4.00 - 5.20 x10*6/uL    Hemoglobin 8.2 (L) 12.0 - 16.0 g/dL    Hematocrit 26.5 (L) 36.0 - 46.0 %     80 - 100 fL    MCH 31.1 26.0 - 34.0 pg    MCHC 30.9 (L) 32.0 - 36.0 g/dL    RDW 22.8 (H) 11.5 - 14.5 %    Platelets 173 150 - 450 x10*3/uL   POCT GLUCOSE   Result Value Ref Range    POCT Glucose 187 (H) 74 - 99 mg/dL         SIGNATURE: Saeed Mondragon MD PATIENT NAME: Ayanna Gross   DATE: February 12, 2024 MRN: [id number]   TIME: 10:33 AM PAGER: [phone]

## 2024-02-12 NOTE — NURSING NOTE
Patient with Rt arm dual lumen picc, dressing D&I dated 2/9, red lumen flushes easily and with positive blood return, curos cap applied. Other lumen in use without difficulty. Rt chest dialysis catheters, dressing D&I

## 2024-02-12 NOTE — PROGRESS NOTES
Ayanna Gross is a 73 y.o. female on day 24 of admission presenting with Septic shock (CMS/Formerly [facility]).    Critical Care Medicine Progress Note    Admitted on:     1/19/2024  Length of Stay: 24 day(s)     Interval History     Still on levo 0.04  She had dialysis yesterday 1.4 kg was removed.  [place] more sleepy this morning after dialysis no other issues.    Objective   Objective     Vitals:    02/12/24 0206   Weight: 89.6 kg (197 lb 8.5 oz)   Body mass index is 36.13 kg/m².        2/12/2024     7:30 AM 2/12/2024     8:00 AM 2/12/2024     8:30 AM 2/12/2024     9:00 AM 2/12/2024     9:30 AM 2/12/2024    10:00 AM 2/12/2024    10:30 AM   Vitals   Systolic 57 78 68 60 74 71 65   Diastolic 32 39 26 26 38 50 38   Heart Rate 90 93 93 95 98 92 92   Temp  37 °C (98.6 °F)        Resp 16 21 19 13 22 18 21        Vent settings:       Intake/Output Summary (Last 24 hours) at 2/12/2024 1036  Last data filed at 2/12/2024 1000  Gross per 24 hour   Intake 1280.56 ml   Output 1980 ml   Net -699.44 ml     Physical exam:  -----------------  Awake and oriented to place and person  Cardiovascular:      Rate and Rhythm: Normal rate. Rhythm irregular.   Pulmonary:      Effort: Pulmonary effort is normal.   Abdominal:      General: Abdomen is flat.   Musculoskeletal:         General: Deformity present. Normal range of motion.      Cervical back: Normal range of motion.      Comments: Abscence of left hand first finger   Skin:     General: Skin is warm.      Capillary Refill: Capillary refill takes less than 2 seconds.      Comments: Skin tear healing to L heal  Wound to bilateral feet and sacrum     Medications     Scheduled Medications:   apixaban, 2.5 mg, oral, BID  doxycylcine, 100 mg, oral, Daily  epoetin di or biosimilar, 10,000 Units, intravenous, Every Mon/Wed/Fri  escitalopram, 10 mg, oral, Nightly  fludrocortisone, 0.1 mg, oral, TID  gabapentin, 100 mg, oral, TID  heparin, 2,000 Units, intra-catheter, After Dialysis  heparin, 2,000  Units, intra-catheter, After Dialysis  heparin, 2,000 Units, intra-catheter, After Dialysis  heparin, 2,000 Units, intra-catheter, After Dialysis  insulin lispro, 0-10 Units, subcutaneous, TID with meals  ipratropium-albuteroL, 3 mL, nebulization, q6h while awake  lidocaine, 5 mL, infiltration, Once  meropenem, 500 mg, intravenous, q12h  midodrine, 15 mg, oral, TID with meals  nystatin, , Topical, BID  nystatin, , Topical, BID  pantoprazole, 40 mg, oral, Daily   Or  pantoprazole, 40 mg, intravenous, Daily  perflutren lipid microspheres, 0.5-10 mL of dilution, intravenous, Once in imaging  perflutren protein A microsphere, 0.5 mL, intravenous, Once in imaging  psyllium, 1 packet, oral, BID  sodium chloride, 3 mL, nebulization, TID  sulfur hexafluoride microsphr, 2 mL, intravenous, Once in imaging  sulfur hexafluoride microsphr, 2 mL, intravenous, Once in imaging  vancomycin, 125 mg, oral, BID  zinc oxide, 1 Application, Topical, BID       Continuous Medications:   norepinephrine, 0.01-3 mcg/kg/min, Last Rate: 0.02 mcg/kg/min (02/12/24 1000)       PRN Medications:     Labs     Results from last 72 hours   Lab Units 02/12/24  0523 02/11/24  0423 02/10/24  0452   GLUCOSE mg/dL 166* 190* 230*   SODIUM mmol/L 136 131* 132*   POTASSIUM mmol/L 3.4 3.3* 3.4   CHLORIDE mmol/L 102 99 98   CO2 mmol/L 24 20* 22*   BUN mg/dL 9 16 12   CREATININE mg/dL 1.60 2.70* 2.00*   EGFR mL/min/1.73m*2 34* 18* 26*   CALCIUM mg/dL 8.4* 8.6 8.6                   Results from last 72 hours   Lab Units 02/12/24  0523 02/11/24  0423 02/10/24  0452   WBC AUTO x10*3/uL 10.3 11.0 10.5   NRBC AUTO /100 WBCs 0.0 0.0 0.0   RBC AUTO x10*6/uL 2.64* 2.52* 2.56*   HEMOGLOBIN g/dL 8.2* 7.9* 8.0*   HEMATOCRIT % 26.5* 25.4* 25.5*   MCV fL 100 101* 100   MCH pg 31.1 31.3 31.3   MCHC g/dL 30.9* 31.1* 31.4*   RDW % 22.8* 22.8* 23.1*   PLATELETS AUTO x10*3/uL 173 228 199         Lab Results   Component Value Date    BLOODCULT No growth at 4 days -  FINAL REPORT  01/19/2024    BLOODCULT No growth at 4 days -  FINAL REPORT 01/19/2024    GRAMSTAIN (4+) Abundant Polymorphonuclear leukocytes (A) 02/10/2024    GRAMSTAIN (3+) Moderate Gram negative bacilli (A) 02/10/2024     Lab Results   Component Value Date    URINECULTURE >100,000 Proteus mirabilis (A) 01/19/2024       Imaging and Diagnostic Studies     Lab/Radiology/Diagnostic Review:  Results for orders placed or performed during the hospital encounter of 01/19/24 (from the past 24 hour(s))   POCT GLUCOSE   Result Value Ref Range    POCT Glucose 158 (H) 74 - 99 mg/dL   POCT GLUCOSE   Result Value Ref Range    POCT Glucose 118 (H) 74 - 99 mg/dL   POCT GLUCOSE   Result Value Ref Range    POCT Glucose 190 (H) 74 - 99 mg/dL   Basic Metabolic Panel   Result Value Ref Range    Glucose 166 (H) 65 - 99 mg/dL    Sodium 136 133 - 145 mmol/L    Potassium 3.4 3.4 - 5.1 mmol/L    Chloride 102 97 - 107 mmol/L    Bicarbonate 24 24 - 31 mmol/L    Urea Nitrogen 9 8 - 25 mg/dL    Creatinine 1.60 0.40 - 1.60 mg/dL    eGFR 34 (L) >60 mL/min/1.73m*2    Calcium 8.4 (L) 8.5 - 10.4 mg/dL    Anion Gap 10 <=19 mmol/L   CBC   Result Value Ref Range    WBC 10.3 4.4 - 11.3 x10*3/uL    nRBC 0.0 0.0 - 0.0 /100 WBCs    RBC 2.64 (L) 4.00 - 5.20 x10*6/uL    Hemoglobin 8.2 (L) 12.0 - 16.0 g/dL    Hematocrit 26.5 (L) 36.0 - 46.0 %     80 - 100 fL    MCH 31.1 26.0 - 34.0 pg    MCHC 30.9 (L) 32.0 - 36.0 g/dL    RDW 22.8 (H) 11.5 - 14.5 %    Platelets 173 150 - 450 x10*3/uL   POCT GLUCOSE   Result Value Ref Range    POCT Glucose 187 (H) 74 - 99 mg/dL     Upper extremity venous duplex bilateral    Result Date: 1/25/2024           [place] [id number]            Phone [phone]  Vascular Lab Report  VASC US UPPER EXTREMITY VENOUS DUPLEX BILATERAL Patient Name:      BASIA Gray Physician:  [id number] Mini Busby MD, RPVI Study Date:         1/25/2024           Ordering Provider:  [id number] ERI BERMAN MRN/PID:           [id number]            Fellow: Accession#:        [id number]        Technologist:       Dara Parr RVT Date of Birth/Age: 1950 / 73 years Technologist 2: Gender:            F                   Encounter#:         [id number] Admission Status:  Inpatient           Location Performed: [facility]  Diagnosis/ICD: Right arm swelling-M79.89; Left arm swelling-M79.89 CPT Codes:     62609 Peripheral venous duplex scan for DVT complete  CONCLUSIONS:  Right Upper Venous: No evidence of acute deep vein thrombus visualized in the right upper extremity. Internal jugular vein was visualized in segments due to IV lines and bandages. Left Upper Venous: No evidence of acute deep vein thrombus visualized in the left upper extremity. Subclavian stent is noted and appears patent. There is a known occluded dialysis access noted.  Additional Findings: Technically difficult exam due to IV lines, bandages, and patient's positioning.  Imaging & Doppler Findings:  Right               Compressible Thrombus        Flow Internal Jugular        Yes        None   Spontaneous/Phasic Subclavian              Yes        None Subclavian Proximal     Yes        None   Spontaneous/Phasic Subclavian Mid          Yes        None Subclavian Distal       Yes        None   Spontaneous/Phasic Axillary                Yes        None       Pulsatile Brachial                Yes        None Cephalic                Yes        None Basilic                 Yes        None  Left                Compress Thrombus        Flow Internal Jugular      Yes      None       Pulsatile Subclavian            Yes      None Subclavian Proximal   Yes      None       Pulsatile Subclavian Mid        Yes      None       Pulsatile Subclavian Distal     Yes      None       Pulsatile Axillary              Yes      None   Spontaneous/Phasic Brachial              Yes      None Cephalic               Yes      None Basilic               Yes      None  [id number] Mini Busby MD, ALICE Electronically signed by [id number] ALICE Kelly MD on 1/25/2024 at 4:06:13 PM  ** Final **     ECG 12 lead    Result Date: 1/24/2024  Sinus tachycardia  with 1st degree AV block Right bundle branch block Possible Lateral infarct , age undetermined Abnormal ECG Confirmed by Yesika Nj ([id number]) on 1/24/2024 6:54:45 AM    XR tibia fibula right 2 views    Result Date: 1/22/2024  Interpreted By:  Real Mendieta, STUDY: XR TIBIA FIBULA RIGHT 2 VIEWS; 1/22/2024 2:15 pm   INDICATION: Signs/Symptoms:evaluate source of infection, osteomyelitis;   COMPARISON: None available.   ACCESSION NUMBER(S): [id number]   ORDERING CLINICIAN: BAN GIFFORD   TECHNIQUE: Views: AP and Lateral of the right tibia and fibula   FINDINGS: RESULT: There is no evidence for fracture or dislocation. Tricompartmental degenerative changes of the right knee. The tibia and fibula appear intact without bony erosion or evidence for osteomyelitis. No other bony or soft tissue abnormality is identified. No subcutaneous gas is noted.       No evidence for acute osseous abnormality. No bony erosion to suggest osteomyelitis.   Signed by: Real Mendieta 1/22/2024 3:15 PM Dictation workstation:   [id number]    XR ankle right 3+ views    Result Date: 1/22/2024  Interpreted By:  Real Mendieta, STUDY: XR ANKLE RIGHT 3+ VIEWS; 1/22/2024 2:15 pm   INDICATION: Signs/Symptoms:Evuluate source of infection, osteomyelitis;   COMPARISON: None available.   ACCESSION NUMBER(S): [id number]   ORDERING CLINICIAN: BAN GIFFORD   TECHNIQUE: Views: AP, Lateral, Oblique, right ankle   FINDINGS: RESULT: There is no evidence for fracture or dislocation. The ankle mortise is intact. Joint spaces appear adequately maintained. No bony erosion to suggest osteomyelitis. No bone lesion or soft tissue abnormality is identified. No subcutaneous gas is seen.       No evidence for acute osseous  abnormality. No bony erosion to suggest osteomyelitis.   Signed by: Real Mendieta 1/22/2024 3:14 PM Dictation workstation:   [id number]    Transthoracic Echo (TTE) Complete    Result Date: 1/22/2024           [place] [id number]            Phone [phone] TRANSTHORACIC ECHOCARDIOGRAM REPORT  Patient Name:      BASIA VEGA      Reading Physician:   [id number] Fausto Soledad  Study Date:        1/22/2024           Ordering Provider:   [id number] ERI BERMAN MRN/PID:           [id number]            Fellow: Accession#:        [id number]        Nurse: Date of Birth/Age: 1950 / 73 years Sonographer:         Tabatha Page RDCS Gender:            F                   Additional Staff: Height:            157.00 cm           Admit Date: Weight:            75.00 kg            Admission Status:    Inpatient - Routine BSA:               1.76 m2             Department Location: [facility] Blood Pressure: 88 /49 mmHg Study Type:    TRANSTHORACIC ECHO (TTE) COMPLETE Diagnosis/ICD: Chronic combined systolic (congestive) and diastolic (congestive)                heart failure (CHF)-I50.42; Sepsis, unspecified organism-A41.9 Indication:    heart failure,septic shock CPT Codes:     Echo Complete w Full Doppler-27614 Patient History: BMI:               Obese >30 Pertinent History: Sepsis, PVD, A-Fib, CVA, Cancer and HTN. breast                    prosthesis/ca, ESRD,anemia,septic shock,heart failure. Study Detail: The following Echo studies were performed: 2D, M-Mode, Doppler and               color flow. Technically challenging study due to prosthesis,               prominent lung artifact and body habitus.  PHYSICIAN INTERPRETATION: Left Ventricle: Left ventricular systolic function is normal, with an estimated ejection fraction of 70%. There are no regional wall motion abnormalities. The left ventricular cavity size is  normal. Left ventricular diastolic filling was indeterminate. Left Atrium: The left atrium is moderately dilated. Right Ventricle: The right ventricle is normal in size. There is normal right ventricular global systolic function. Right Atrium: The right atrium is normal in size. Aortic Valve: The aortic valve is trileaflet. There is no evidence of aortic valve regurgitation. The peak instantaneous gradient of the aortic valve is 2.8 mmHg. Mitral Valve: The mitral valve is normal in structure. There is no evidence of mitral valve regurgitation. Tricuspid Valve: The tricuspid valve is structurally normal. There is trace tricuspid regurgitation. Pulmonic Valve: The pulmonic valve is not well visualized. The pulmonic valve regurgitation was not well visualized. Pericardium: There is no pericardial effusion noted. Aorta: The aortic root is normal.  CONCLUSIONS:  1. Left ventricular systolic function is normal with a 70% estimated ejection fraction.  2. The left atrium is moderately dilated.  3. Left ventricular diastolic filling indeterminate. QUANTITATIVE DATA SUMMARY: 2D MEASUREMENTS:                          Normal Ranges: LAs:           4.50 cm   (2.7-4.0cm) IVSd:          0.61 cm   (0.6-1.1cm) LVPWd:         0.88 cm   (0.6-1.1cm) LVIDd:         3.66 cm   (3.9-5.9cm) LV Mass Index: 41.9 g/m2 LV SYSTOLIC FUNCTION BY 2D PLANIMETRY (MOD):                     Normal Ranges: EF-A4C View: 68.3 % (>=55%) LV DIASTOLIC FUNCTION:                     Normal Ranges: MV Peak E: 1.19 m/s (0.7-1.2 m/s) MV Peak A: 0.90 m/s (0.42-0.7 m/s) E/A Ratio: 1.32     (1.0-2.2) MITRAL VALVE:                 Normal Ranges: MV DT: 192 msec (150-240msec) AORTIC VALVE:                         Normal Ranges: AoV Vmax:      0.84 m/s (<=1.7m/s) AoV Peak P.8 mmHg (<20mmHg) LVOT Max Shimon:  0.47 m/s (<=1.1m/s) LVOT Diameter: 1.90 cm  (1.8-2.4cm) AoV Area,Vmax: 1.57 cm2 (2.5-4.5cm2) TRICUSPID VALVE/RVSP:                   Normal Ranges: IVC Diam:  1.05 cm  [id number] Fausto Rowe DO Electronically signed on 1/22/2024 at 2:53:04 PM  ** Final **     XR foot left 3+ views    Result Date: 1/21/2024  Interpreted By:  Real Mendieta, STUDY: XR FOOT LEFT 1-2 VIEWS; 1/21/2024 4:15 pm   INDICATION: Signs/Symptoms:osteomyelitis. Pain   COMPARISON: 12/07/2023   ACCESSION NUMBER(S): [id number]   ORDERING CLINICIAN: BAN GIFFORD   TECHNIQUE: Views:  AP, Lat, Oblique, left foot   FINDINGS: RESULT: There is no evidence for fracture or dislocation. 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 Mild hammertoe deformities. Joint spaces appear adequately maintained. Soft tissue ulceration of the posterior aspect of the heel however no evidence for bony erosion to suggest osteomyelitis.       No evidence for acute fracture or acute bony erosion to suggest osteomyelitis. Chronic changes as described.   Signed by: Real Mendieta 1/21/2024 7:15 PM Dictation workstation:   [id number]    CT chest abdomen pelvis wo IV contrast    Result Date: 1/21/2024  Interpreted By:  Maria Garcia, STUDY: CT CHEST ABDOMEN PELVIS WO CONTRAST;  1/20/2024 11:42 pm   INDICATION: Mental status change. Elevated white blood cell count with infectious workup.   COMPARISON: 08/20/2023   ACCESSION NUMBER(S): [id number]   ORDERING CLINICIAN: BAN GIFFORD   TECHNIQUE: CT of the chest, abdomen and pelvis was performed with no oral or intravenous contrast administered. Sagittal and coronal reformations were completed by the technologist at the acquisition scanner.   All CT examinations are performed with 1 or more of the following dose reduction techniques: Automated exposure control, adjustment of mA and/or kv according to patient's size, or use of iterative reconstruction techniques.   FINDINGS: Please note that the study is limited without intravenous contrast.   CHEST: Bilateral pleural effusions are present with right effusion moderately small in size and with the left effusion  moderately small in size as well. There is shift of the heart and mediastinum to the left of midline due to atelectasis of the left upper lobe. There is consolidation throughout left lower lobe with air bronchograms noted. On the right, there is compressive atelectasis seen posteriorly in the right lower lobe.   There is mild reactive mediastinal adenopathy seen at this time with mediastinal lymph nodes increased in size since prior study. Several of these mediastinal nodes are at the upper limits of normal measuring 8 mm in short axis diameter.   No pericardial effusion is present. The cardiac size is normal with mitral annulus calcification.   There has been prior bilateral mastectomy with reconstruction of the left breast with implant placement. Surgical clips are visible within the left axilla. Stent grafts are visible within the left upper extremity and within the left innominate, subclavian as well as axillary veins.   A peripherally calcified 1.8 cm nodule arises from the lower pole of left thyroid lobe.   ABDOMEN:   LIVER: Unremarkable.   BILE DUCTS: No intrahepatic or extrahepatic biliary ductal dilatation is seen.   GALLBLADDER: Cholelithiasis is observed with some calcification of the gallbladder wall demonstrated as well. No gallbladder wall thickening or pericholecystic fluid is evident.   PANCREAS: Unremarkable   SPLEEN: Unremarkable   ADRENAL GLANDS: Unremarkable   KIDNEYS AND URETERS: Kidneys are small in size with extensive renal artery calcification demonstrated.   PELVIS:   BLADDER: Amos catheter is present within the decompressed urinary bladder.   REPRODUCTIVE ORGANS: Calcification of the arcuate arteries within the uterus is seen. There is no uterine enlargement or adnexal mass.   BOWEL: A rectal balloon is seen. There is no abnormal distention of the intestinal tract.   VESSELS: There is atherosclerosis of the thoracoabdominal aorta and iliac arteries with calcification in the walls of the  celiac, splenic, hepatic, and mesenteric arteries.   PERITONEUM/RETROPERITONEUM/LYMPH NODES: There is a minimal amount of ascites seen about the liver and spleen with mild presacral edema noted.   ABDOMINAL WALL: There is anasarca involving the soft tissues of the abdomen and pelvis. Postoperative change from ventral hernia repair is seen.   BONES: Bilateral sacroiliitis is seen. There is lumbar dextroscoliosis. Chronic rotator cuff tear is present bilaterally with osteoarthritis of both shoulders, right greater than left.       Chest 1.  Moderately small bilateral pleural effusions with left upper lobe atelectasis and compressive atelectasis seen posteriorly in right lower lobe. A left lower lobe pneumonia is identified. There is extensive airspace consolidation within left lower lobe with air bronchograms observed. Mild mediastinal reactive adenopathy is present as well.   Abdomen-Pelvis 1.  Cholelithiasis without evidence of cholecystitis. 2. Small amount of ascites with mild presacral edema and anasarca within the soft tissues of the abdominal wall. 3. Renal atrophy with extensive vascular calcifications.     MACRO: None   Signed by: Maria Garcia 1/21/2024 8:30 AM Dictation workstation:   [id number]    CT head wo IV contrast    Result Date: 1/21/2024  Interpreted By:  Maria Garcia, STUDY: CT HEAD WO IV CONTRAST 1/20/2024 11:42 pm   INDICATION: Signs/Symptoms:mental status changes   COMPARISON: 12/11/2023   ACCESSION NUMBER(S): [id number]   ORDERING CLINICIAN: BAN GIFFORD   TECHNIQUE: Unenhanced axial images of the brain are completed.   All CT examinations are performed with 1 or more of the following dose reduction techniques: Automated exposure control, adjustment of mA and/or kv according to patient's size, or use of iterative reconstruction techniques.   FINDINGS: Helical unenhanced axial images of the brain demonstrate a mild to moderate degree of ventricular enlargement with proportionate widening of the  sulci and sylvian fissures. There is no midline shift, mass effect, extra-axial fluid collection, or acute intracranial hemorrhage. There is diminished density seen in the periventricular white matter indicating chronic microvascular ischemic disease. There is calcified plaque seen within the distal vertebral and internal carotid arteries bilaterally. No calvarial abnormality is seen.       Atrophy and chronic microvascular ischemic disease without acute intracranial process.   Signed by: Maria Garcia 1/21/2024 8:09 AM Dictation workstation:   [id number]    XR chest 1 view    Result Date: 1/20/2024  Interpreted By:  Brendon Perez, STUDY: XR CHEST 1 VIEW; 1/20/2024 5:03 pm   INDICATION: CLINICAL INFORMATION: Signs/Symptoms:Insertion right IJ central line, also left thoracentesis.   COMPARISON: 01/19/2024 at 1338 hours   ACCESSION NUMBER(S): [id number]   ORDERING CLINICIAN: BOZENA ANTONIO   TECHNIQUE: Portable chest one view.   FINDINGS: The cardiac size is indeterminate in view of the AP projection. There is no change in the right-sided dual port central venous catheter. There is a new right internal jugular venous catheter present with the tip at approximately same level as the dual port central venous catheter, overlying the mid to lower right atrium. There is no evidence for pneumothorax. Patient has a history of left thoracentesis without evidence for pneumothorax on the left. There is bilateral basilar alveolar infiltrate and effusions. Left effusion is decreased compared to the study from 1 day earlier.   Surgical clips are identified within left chest wall. Endovascular stent is identified in the distribution of the left subclavian artery.       1. Status post right-sided central venous catheter placement as described above with the tip overlying the mid to caudal aspect of the right atrium. There is no evidence for pneumothorax. 2. No evidence for left-sided pneumothorax after left-sided thoracentesis.  Decrease in the left effusion. 3. Bilateral basilar infiltrates and effusions are present. Follow-up to assure complete clearing is suggested.   MACRO: none   Signed by: Brendon Perez 1/20/2024 5:11 PM Dictation workstation:   [id number]    XR chest 1 view    Result Date: 1/19/2024  Interpreted By:  Real Mendieta, STUDY: XR CHEST 1 VIEW; 1/19/2024 1:38 pm   INDICATION: Signs/Symptoms:dyspnea.   COMPARISON: 12/26/2023   ACCESSION NUMBER(S): [id number]   ORDERING CLINICIAN: EMELY GOLDSMITH   TECHNIQUE: 1 view of the chest was performed.   FINDINGS: There may be a minimal right pleural effusion. Slight prominence of the interstitium otherwise the right lung is clear. Improved appearance of the left lung with improved aeration of the right upper lobe. There is opacification of the left lower lobe possibly due to large pleural effusion which is similar to the prior study. No pneumothorax. Right-sided dual lumen central line catheter with tip at the proximal atrium. The cardiomediastinal silhouette is within normal limits. Left-sided subclavian stents are again noted.       Improved aeration and appearance of the left lung superiorly however there is a persistent large left pleural effusion and opacification of the left lower lobe.   Signed by: Real Mendieta 1/19/2024 1:44 PM Dictation workstation:   [id number]         Assessment / Plan       PROBLEM LIST:  - End stage heart failure with preserved EF - on vasopressors  - End stage renal disease on HD  - Cardiogenic shock - will continue to wean down levo her blood pressure measured 1 does not reflect of the actual blood pressure as the patient is fully awake despite readings of systolic in the 40s and 50s arterial line was also inaccurate due to vasculopathy  - Acute metabolic encephalopathy  - Failure to wean from vasopressors  - Goals of care discussion    MANAGEMENT PLAN:  - Start weaning levo and assess mental status  - received HD today   -Already on  Fluorinef tid  - Palliative care consult  - Cardiology consultation - patient's daughter declined a RHC  - Peer to peer done with insurance, NOT approved  - Family will update us with goals of care  - Cont Doxy and Vanc PO. Meropenem added for GNB in swab culture from right heal   Appreciate ID consult.  - Routine ICU care  - PT/OT  Overall prognosis poor due to chronic medical problems including end-stage renal disease heart failure patient is bedridden with decubitus ulcers  I have personally interviewed and examined the patient.  I have personally verified elements of the exam listed above. Interval changes or irregularities are as noted.  I have personally and independently reviewed laboratory, radiographic and procedural data.  I have personally reviewed the problem list above and concur. Changes, if any, are noted.  I have personally reviewed the plan list above and concur. Changes, if any, are noted.    The patient has high probability of compromise including but not limited to organ system failure, mechanical respiratory and circulatory support, cardiac arrest and death. I have discussed this in detail with the family / caregivers.    I have personally answered all questions to the satisfaction of the patient and / or family members to their satisfaction.      Tarek Gharibeh MD

## 2024-02-12 NOTE — CARE PLAN
The patient's goals for the shift include to get some good sleep tonight    The clinical goals for the shift include wean levophed as tolerated, receive tablo treatment without incident      Problem: Pain  Goal: My pain/discomfort is manageable  Outcome: Progressing     Problem: Safety  Goal: Patient will be injury free during hospitalization  Outcome: Progressing  Goal: I will remain free of falls  Outcome: Progressing     Problem: Daily Care  Goal: Daily care needs are met  Outcome: Progressing     Problem: Psychosocial Needs  Goal: Demonstrates ability to cope with hospitalization/illness  Outcome: Progressing  Goal: Collaborate with me, my family, and caregiver to identify my specific goals  Outcome: Progressing     Problem: Discharge Barriers  Goal: My discharge needs are met  Outcome: Progressing     Problem: Fall/Injury  Goal: Not fall by end of shift  Outcome: Progressing  Goal: Be free from injury by end of the shift  Outcome: Progressing  Goal: Verbalize understanding of personal risk factors for fall in the hospital  Outcome: Progressing  Goal: Verbalize understanding of risk factor reduction measures to prevent injury from fall in the home  Outcome: Progressing  Goal: Pace activities to prevent fatigue by end of the shift  Outcome: Progressing     Problem: Skin  Goal: Decreased wound size/increased tissue granulation at next dressing change  Outcome: Progressing  Flowsheets (Taken 2/11/2024 2303)  Decreased wound size/increased tissue granulation at next dressing change: Protective dressings over bony prominences  Goal: Participates in plan/prevention/treatment measures  Outcome: Progressing  Flowsheets (Taken 2/11/2024 2303)  Participates in plan/prevention/treatment measures: Elevate heels  Goal: Prevent/manage excess moisture  Outcome: Progressing  Flowsheets (Taken 2/11/2024 2303)  Prevent/manage excess moisture:   Moisturize dry skin   Cleanse incontinence/protect with barrier cream   Monitor  for/manage infection if present   Follow provider orders for dressing changes  Goal: Prevent/minimize sheer/friction injuries  Outcome: Progressing  Flowsheets (Taken 2/11/2024 2303)  Prevent/minimize sheer/friction injuries:   HOB 30 degrees or less   Turn/reposition every 2 hours/use positioning/transfer devices   Use pull sheet  Goal: Promote/optimize nutrition  Outcome: Progressing  Flowsheets (Taken 2/11/2024 2303)  Promote/optimize nutrition:   Consume > 50% meals/supplements   Monitor/record intake including meals   Offer water/supplements/favorite foods   Assist with feeding  Goal: Promote skin healing  Outcome: Progressing  Flowsheets (Taken 2/11/2024 2303)  Promote skin healing:   Turn/reposition every 2 hours/use positioning/transfer devices   Protective dressings over bony prominences   Assess skin/pad under line(s)/device(s)     Problem: Pain  Goal: Takes deep breaths with improved pain control throughout the shift  Outcome: Progressing  Goal: Turns in bed with improved pain control throughout the shift  Outcome: Progressing  Goal: Walks with improved pain control throughout the shift  Outcome: Progressing  Goal: Performs ADL's with improved pain control throughout shift  Outcome: Progressing     Problem: Respiratory  Goal: Clear secretions with interventions this shift  Outcome: Progressing  Goal: Minimize anxiety/maximize coping throughout shift  Outcome: Progressing  Goal: Minimal/no exertional discomfort or dyspnea this shift  Outcome: Progressing  Goal: Patent airway maintained this shift  Outcome: Progressing  Goal: Tolerate pulmonary toileting this shift  Outcome: Progressing  Goal: Verbalize decreased shortness of breath this shift  Outcome: Progressing  Goal: Increase self care and/or family involvement in next 24 hours  Outcome: Progressing  Goal: No signs of respiratory distress (eg. Use of accessory muscles. Peds grunting)  Outcome: Progressing     Problem: Diabetes  Goal: Maintain  electrolyte levels within acceptable range throughout shift  Outcome: Progressing  Goal: Maintain glucose levels >70mg/dl to <250mg/dl throughout shift  Outcome: Progressing  Goal: No changes in neurological exam by end of shift  Outcome: Progressing  Goal: Learn about and adhere to nutrition recommendations by end of shift  Outcome: Progressing  Goal: Vital signs within normal range for age by end of shift  Outcome: Progressing  Goal: Increase self care and/or family involovement by end of shift  Outcome: Progressing  Goal: Receive DSME education by end of shift  Outcome: Progressing     Problem: Discharge Planning  Goal: Discharge to home or other facility with appropriate resources  Outcome: Progressing     Problem: Chronic Conditions and Co-morbidities  Goal: Patient's chronic conditions and co-morbidity symptoms are monitored and maintained or improved  Outcome: Progressing

## 2024-02-12 NOTE — CARE PLAN
Problem: Pain  Goal: My pain/discomfort is manageable  Outcome: Progressing  Flowsheets (Taken 2/2/2024 1928)  Resident's pain/discomfort is manageable:   Include resident/family/caregiver in decisions related to pain management   Offer non-pharmacological pain management interventions   Administer pain medication prior to activities that may trigger pain   Identify and avoid pain triggers     Problem: Safety  Goal: Patient will be injury free during hospitalization  Outcome: Progressing  Goal: I will remain free of falls  Outcome: Progressing     Problem: Daily Care  Goal: Daily care needs are met  Outcome: Progressing  Flowsheets (Taken 2/2/2024 1928)  Daily care needs are met:   Assess and monitor ability to perform self care and identify potential discharge needs   Assess skin integrity/risk for skin breakdown   Encourage independent activity per ability   Provide mouth care   Include patient/family/caregiver in decisions related to daily care   Assist patient with activities of daily living as needed     Problem: Psychosocial Needs  Goal: Demonstrates ability to cope with hospitalization/illness  Outcome: Progressing  Flowsheets (Taken 2/1/2024 1837)  Demonstrates ability to cope with hospitalization/illness:   Encourage verbalization of feelings/concerns/expectations   Provide low-stimulation environment as needed   Assist resident to identify and practice own strengths and abilities   Encourage resident to set and complete small goals for self   Encourage participation in diversional activities   Reinforce positive adaptation of new coping behaviors   Include resident/family/caregiver in decisions related to psychosocial needs  Goal: Collaborate with me, my family, and caregiver to identify my specific goals  Outcome: Progressing     Problem: Discharge Barriers  Goal: My discharge needs are met  Outcome: Progressing  Flowsheets (Taken 2/2/2024 1928)  Resident's discharge needs are met:   Identify potential  discharge barriers on admission and throughout stay   Involve resident/family/caregiver in discharge planning process     Problem: Fall/Injury  Goal: Not fall by end of shift  Outcome: Progressing  Goal: Be free from injury by end of the shift  Outcome: Progressing  Goal: Verbalize understanding of personal risk factors for fall in the hospital  Outcome: Progressing  Goal: Verbalize understanding of risk factor reduction measures to prevent injury from fall in the home  Outcome: Progressing  Goal: Pace activities to prevent fatigue by end of the shift  Outcome: Progressing     Problem: Skin  Goal: Decreased wound size/increased tissue granulation at next dressing change  Outcome: Progressing  Flowsheets (Taken 2/12/2024 1841)  Decreased wound size/increased tissue granulation at next dressing change:   Protective dressings over bony prominences   Promote sleep for wound healing  Goal: Participates in plan/prevention/treatment measures  Outcome: Progressing  Flowsheets (Taken 2/12/2024 1841)  Participates in plan/prevention/treatment measures:   Discuss with provider PT/OT consult   Elevate heels  Goal: Prevent/manage excess moisture  Outcome: Progressing  Flowsheets (Taken 2/12/2024 1841)  Prevent/manage excess moisture:   Cleanse incontinence/protect with barrier cream   Monitor for/manage infection if present   Follow provider orders for dressing changes  Goal: Prevent/minimize sheer/friction injuries  Outcome: Progressing  Flowsheets (Taken 2/12/2024 1841)  Prevent/minimize sheer/friction injuries:   HOB 30 degrees or less   Complete micro-shifts as needed if patient unable. Adjust patient position to relieve pressure points, not a full turn   Increase activity/out of bed for meals   Turn/reposition every 2 hours/use positioning/transfer devices   Use pull sheet   Utilize specialty bed per algorithm  Goal: Promote/optimize nutrition  Outcome: Progressing  Flowsheets (Taken 2/12/2024 1841)  Promote/optimize  nutrition:   Consume > 50% meals/supplements   Offer water/supplements/favorite foods   Monitor/record intake including meals  Goal: Promote skin healing  Outcome: Progressing  Flowsheets (Taken 2/12/2024 1841)  Promote skin healing:   Assess skin/pad under line(s)/device(s)   Ensure correct size (line/device) and apply per  instructions   Protective dressings over bony prominences   Rotate device position/do not position patient on device   Turn/reposition every 2 hours/use positioning/transfer devices     Problem: Pain  Goal: Takes deep breaths with improved pain control throughout the shift  Outcome: Progressing  Goal: Turns in bed with improved pain control throughout the shift  Outcome: Progressing  Goal: Walks with improved pain control throughout the shift  Outcome: Progressing  Goal: Performs ADL's with improved pain control throughout shift  Outcome: Progressing     Problem: Respiratory  Goal: Clear secretions with interventions this shift  Outcome: Progressing  Flowsheets (Taken 2/1/2024 1837)  Clear secretions with interventions this shift:   Encourage/provide pulmonary hygiene/secretion clearance   Med administration/monitoring of effect  Goal: Minimize anxiety/maximize coping throughout shift  Outcome: Progressing  Flowsheets (Taken 2/1/2024 1837)  Minimize anxiety/maximize coping throughout shift:   Med administration/monitoring of effect   Monitor pain/anxiety level  Goal: Minimal/no exertional discomfort or dyspnea this shift  Outcome: Progressing  Flowsheets (Taken 2/10/2024 1755)  Minimal/no exertional discomfort or dyspnea this shift: Positioning to promote ventilation/comfort  Goal: Patent airway maintained this shift  Outcome: Progressing  Goal: Tolerate pulmonary toileting this shift  Outcome: Progressing  Flowsheets (Taken 2/11/2024 1724)  Tolerate pulmonary toileting this shift: Positioning to promote ventilation/comfort  Goal: Verbalize decreased shortness of breath this  shift  Outcome: Progressing  Flowsheets (Taken 2/11/2024 1724)  Verbalize decreased shortness of breath this shift: Encourage/provide pulmonary hygiene/secretion clearance  Goal: Increase self care and/or family involvement in next 24 hours  Outcome: Progressing  Flowsheets (Taken 2/11/2024 1724)  Increase self care and/or family involvement in next 24 hours: Encourage activity/mobility  Goal: No signs of respiratory distress (eg. Use of accessory muscles. Peds grunting)  Outcome: Progressing     Problem: Diabetes  Goal: Maintain electrolyte levels within acceptable range throughout shift  Outcome: Progressing  Flowsheets (Taken 2/1/2024 1837)  Maintain electrolyte levels within acceptable range throughout shift:   Monitor urine output   Med administration/monitoring of effect  Goal: Maintain glucose levels >70mg/dl to <250mg/dl throughout shift  Outcome: Progressing  Flowsheets (Taken 2/10/2024 1755)  Maintain glucose levels >70mg/dl to <250mg/dl throughout shift: Med administration/monitoring of effect  Goal: No changes in neurological exam by end of shift  Outcome: Progressing  Flowsheets (Taken 2/12/2024 1841)  No changes in neurological exam by end of shift: Complete frequent neurological assessments  Goal: Learn about and adhere to nutrition recommendations by end of shift  Outcome: Progressing  Flowsheets (Taken 2/12/2024 1841)  Learn about and adhere to nutrition recommendations by end of shift: Ensure/encourage compliance with appropriate diet  Goal: Vital signs within normal range for age by end of shift  Outcome: Progressing  Flowsheets (Taken 2/12/2024 1841)  Vital signs within normal range for age by end of shift: Med administration/monitoring of effect  Goal: Increase self care and/or family involovement by end of shift  Outcome: Progressing     Problem: Discharge Planning  Goal: Discharge to home or other facility with appropriate resources  Outcome: Progressing  Flowsheets (Taken 2/1/2024  1837)  Discharge to home or other facility with appropriate resources:   Identify barriers to discharge with patient and caregiver   Arrange for needed discharge resources and transportation as appropriate   Identify discharge learning needs (meds, wound care, etc)   Arrange for interpreters to assist at discharge as needed   Refer to discharge planning if patient needs post-hospital services based on physician order or complex needs related to functional status, cognitive ability or social support system     Problem: Chronic Conditions and Co-morbidities  Goal: Patient's chronic conditions and co-morbidity symptoms are monitored and maintained or improved  Outcome: Progressing  Flowsheets (Taken 2/1/2024 9062)  Care Plan - Patient's Chronic Conditions and Co-Morbidity Symptoms are Monitored and Maintained or Improved:   Monitor and assess patient's chronic conditions and comorbid symptoms for stability, deterioration, or improvement   Collaborate with multidisciplinary team to address chronic and comorbid conditions and prevent exacerbation or deterioration   Update acute care plan with appropriate goals if chronic or comorbid symptoms are exacerbated and prevent overall improvement and discharge   The patient's goals for the shift include Visit with family.    The clinical goals for the shift include Wean levophed as tolerated. No neurological changes.    Levophed was weaned off. Patient's mentation remained unchanged/stable. Will continue to monitor.

## 2024-02-12 NOTE — PROGRESS NOTES
Palliative Care Progress Note    Date of Admission: 1/19/2024    Patient is a 73 y.o. female admitted with Septic shock (CMS/[facility]). Had dialysis this AM. Silvano well. Took off 1.5 liters, nurses continuing to wean down Levophed, on 0.02 mcg now. She denies dizziness, headache, confusion.    Mental/Cognitive Status: awake, alert    Respiratory Status: denies sob/cough/wheeze     Pain Assessment:denies    Pertinent Symptoms: no n/v/c/d    Diet/Nutrition: fair    Bowel Regimen: FMS    Patient's current condition/Anticipated Prognosis: poor.  Family/Healthcare Proxy involvement: daughters, spouse.    Scheduled medications  apixaban, 2.5 mg, oral, BID  doxycylcine, 100 mg, oral, Daily  epoetin di or biosimilar, 10,000 Units, intravenous, Every Mon/Wed/Fri  escitalopram, 10 mg, oral, Nightly  fludrocortisone, 0.1 mg, oral, TID  gabapentin, 100 mg, oral, TID  heparin, 2,000 Units, intra-catheter, After Dialysis  heparin, 2,000 Units, intra-catheter, After Dialysis  heparin, 2,000 Units, intra-catheter, After Dialysis  heparin, 2,000 Units, intra-catheter, After Dialysis  insulin lispro, 0-10 Units, subcutaneous, TID with meals  ipratropium-albuteroL, 3 mL, nebulization, q6h while awake  lidocaine, 5 mL, infiltration, Once  meropenem, 500 mg, intravenous, q12h  midodrine, 15 mg, oral, TID with meals  nystatin, , Topical, BID  nystatin, , Topical, BID  pantoprazole, 40 mg, oral, Daily   Or  pantoprazole, 40 mg, intravenous, Daily  perflutren lipid microspheres, 0.5-10 mL of dilution, intravenous, Once in imaging  perflutren protein A microsphere, 0.5 mL, intravenous, Once in imaging  psyllium, 1 packet, oral, BID  sodium chloride, 3 mL, nebulization, TID  sulfur hexafluoride microsphr, 2 mL, intravenous, Once in imaging  sulfur hexafluoride microsphr, 2 mL, intravenous, Once in imaging  vancomycin, 125 mg, oral, BID  zinc oxide, 1 Application, Topical, BID      Continuous medications  norepinephrine, 0.01-3 mcg/kg/min, Last  Rate: 0.01 mcg/kg/min (02/12/24 1200)      PRN medications  PRN medications: acetaminophen, alteplase, ammonium lactate, benzocaine-menthoL, dextrose 10 % in water (D10W), dextrose, glucagon, ondansetron, oxygen, sennosides-docusate sodium     Results for orders placed or performed during the hospital encounter of 01/19/24 (from the past 24 hour(s))   POCT GLUCOSE   Result Value Ref Range    POCT Glucose 118 (H) 74 - 99 mg/dL   POCT GLUCOSE   Result Value Ref Range    POCT Glucose 190 (H) 74 - 99 mg/dL   Basic Metabolic Panel   Result Value Ref Range    Glucose 166 (H) 65 - 99 mg/dL    Sodium 136 133 - 145 mmol/L    Potassium 3.4 3.4 - 5.1 mmol/L    Chloride 102 97 - 107 mmol/L    Bicarbonate 24 24 - 31 mmol/L    Urea Nitrogen 9 8 - 25 mg/dL    Creatinine 1.60 0.40 - 1.60 mg/dL    eGFR 34 (L) >60 mL/min/1.73m*2    Calcium 8.4 (L) 8.5 - 10.4 mg/dL    Anion Gap 10 <=19 mmol/L   CBC   Result Value Ref Range    WBC 10.3 4.4 - 11.3 x10*3/uL    nRBC 0.0 0.0 - 0.0 /100 WBCs    RBC 2.64 (L) 4.00 - 5.20 x10*6/uL    Hemoglobin 8.2 (L) 12.0 - 16.0 g/dL    Hematocrit 26.5 (L) 36.0 - 46.0 %     80 - 100 fL    MCH 31.1 26.0 - 34.0 pg    MCHC 30.9 (L) 32.0 - 36.0 g/dL    RDW 22.8 (H) 11.5 - 14.5 %    Platelets 173 150 - 450 x10*3/uL   POCT GLUCOSE   Result Value Ref Range    POCT Glucose 187 (H) 74 - 99 mg/dL   POCT GLUCOSE   Result Value Ref Range    POCT Glucose 190 (H) 74 - 99 mg/dL      Transthoracic Echo (TTE) Limited  Result Date: 2/7/2024  PHYSICIAN INTERPRETATION: Left Ventricle: Left ventricular systolic function is normal, with an estimated ejection fraction of 60-65%. There are no regional wall motion abnormalities. The left ventricular cavity size is normal. Left ventricular diastolic filling was indeterminate. Left Atrium: The left atrium is moderately dilated. Right Ventricle: The right ventricle is normal in size. There is normal right ventricular global systolic function. Right Atrium: The right atrium is  normal in size. Aortic Valve: The aortic valve is trileaflet. There is no evidence of aortic valve regurgitation. The peak instantaneous gradient of the aortic valve is 5.3 mmHg. Mitral Valve: The mitral valve is normal in structure. There is severe mitral annular calcification. There is trace mitral valve regurgitation. Tricuspid Valve: The tricuspid valve is structurally normal. There is mild tricuspid regurgitation. Pulmonic Valve: The pulmonic valve is not well visualized. The pulmonic valve regurgitation was not well visualized. Pericardium: There is no pericardial effusion noted. Aorta: The aortic root is normal.  CONCLUSIONS:  1. Left ventricular systolic function is normal with a 60-65% estimated ejection fraction.  2. The left atrium is moderately dilated.  3. There is severe mitral annular calcification.        Vitals:    02/12/24 1230   BP: (!) 66/43   Pulse: 91   Resp: 17   Temp:    SpO2: 92%     Physical Exam  Vitals and nursing note reviewed.   Constitutional:       General: She is not in acute distress.     Appearance: She is not toxic-appearing.   HENT:      Head: Normocephalic and atraumatic.      Mouth/Throat:      Mouth: Mucous membranes are moist.      Pharynx: Oropharynx is clear.   Eyes:      Extraocular Movements: Extraocular movements intact.      Pupils: Pupils are equal, round, and reactive to light.   Cardiovascular:      Rate and Rhythm: Normal rate and regular rhythm.      Pulses: Normal pulses.      Heart sounds: Normal heart sounds.   Pulmonary:      Effort: Pulmonary effort is normal. No respiratory distress.      Breath sounds: Normal breath sounds. No wheezing or rales.   Abdominal:      General: There is no distension.      Palpations: Abdomen is soft.      Tenderness: There is no abdominal tenderness. There is no guarding.   Musculoskeletal:      Cervical back: Normal range of motion.      Right lower leg: No edema.      Left lower leg: No edema.      Comments: Left foot wrapped    Skin:     General: Skin is warm and dry.      Findings: Bruising present.   Neurological:      General: No focal deficit present.      Mental Status: She is alert and oriented to person, place, and time.   Psychiatric:         Mood and Affect: Mood normal.         Behavior: Behavior normal.          Assessment/Plan   Septic shock (CMS/HCC)   IMP:    End stage heart failure with preserved EF - remains on vasopressors with sbp 50-60's 0.02 levophed. Declined RHC  End stage renal disease on HD - mgmt per Dr. Mondragon, had dialysis this AM. Removed 1.5 liters  Cardiogenic shock - 2/2 chf end stage  Infection of left foot wound- Cultures + pseudomonas and klebsiella. ID following. Now on Merrem IV  Hx C.diff - on vanco suppressive therapy, has FMS in place    Palliative Care   FULL CODE  Pt is a capable decision maker, but makes decisions with input from daughters. Meka daughter is also hc-POA.   Patient remains without a discharge plan. She does not currently qualify for LTACH. Levophed is currently being titrated to mentation as her baseline blood pressure is low coupled with poor vasculature we may not be getting a true picture of her real numbers.   As of now there are not many options for discharge as the patient and family does not have interest in a palliative/comfort approach. Options:  LTC with outpatient dialysis - not realistic due to low BP's she will likely be sent back to ED regardless of mental status  Home with outpatient dialysis - again, not realistic due to level of care she will require and now on IV antibiotics for left foot wound infection  Hospice either at home or IPU - she has declined this option in the past. I tried to call daughter Meka and VM was full. We need to re-explore this option    D/w Dr. Mondragon, Dr. Coulter and ICU nurse Peri.    Advance Directives Info: Patient has advance directive, copy in chart  Discharge Planning: as above  Palliative Care Team will continue to  follow patient.      Tata Vazquez, APRN-CNP     **Addendum**I was able to speak to patient's daughter Laura by phone and gave update on above and need to reevaluate goals/plan if/when patient is weaned off vasopressors. Answered questions and suggested meeting with her sister Meka to discuss overall plan.     Total time today 70 minutes

## 2024-02-12 NOTE — CARE PLAN
Problem: Respiratory  Goal: Minimize anxiety/maximize coping throughout shift  Outcome: Progressing     Problem: Respiratory  Goal: Tolerate pulmonary toileting this shift  Outcome: Progressing     Problem: Respiratory  Goal: Verbalize decreased shortness of breath this shift  Outcome: Progressing

## 2024-02-13 NOTE — PROGRESS NOTES
Ayanna Gross is a 73 y.o. female on day 25 of admission presenting with Septic shock (CMS/[facility]).    Critical Care Medicine Progress Note    Admitted on:     1/19/2024  Length of Stay: 25 day(s)     Interval History     She is off Levophed since last night  More awake today following commands no issues overnight.    Objective   Objective     Vitals:    02/12/24 0206   Weight: 89.6 kg (197 lb 8.5 oz)   Body mass index is 36.13 kg/m².        2/13/2024     3:00 AM 2/13/2024     3:30 AM 2/13/2024     4:00 AM 2/13/2024     4:30 AM 2/13/2024     5:00 AM 2/13/2024     6:00 AM 2/13/2024     7:00 AM   Vitals   Systolic 64 55 57 55 56 56 52   Diastolic 42 30 33 42 44 27 31   Heart Rate 101 97 96 97 96 94 97   Temp   36.2 °C (97.2 °F)    37 °C (98.6 °F)   Resp 22 17 17 19 18 19 15        Vent settings:  FiO2 (%):  [32 %] 32 %    Intake/Output Summary (Last 24 hours) at 2/13/2024 1045  Last data filed at 2/13/2024 0730  Gross per 24 hour   Intake 496.71 ml   Output 550 ml   Net -53.29 ml     Physical exam:  -----------------  Awake and oriented to place and person  Cardiovascular:      Rate and Rhythm: Normal rate. Rhythm irregular.   Pulmonary:      Effort: Pulmonary effort is normal.   Abdominal:      General: Abdomen is flat.   Musculoskeletal:         General: Deformity present. Normal range of motion.      Cervical back: Normal range of motion.      Comments: Abscence of left hand first finger   Skin:     General: Skin is warm.      Capillary Refill: Capillary refill takes less than 2 seconds.      Comments: Skin tear healing to L heal  Wound to bilateral feet and sacrum     Medications     Scheduled Medications:   apixaban, 2.5 mg, oral, BID  doxycylcine, 100 mg, oral, Daily  epoetin di or biosimilar, 10,000 Units, intravenous, Every Mon/Wed/Fri  escitalopram, 10 mg, oral, Nightly  fludrocortisone, 0.1 mg, oral, TID  gabapentin, 100 mg, oral, TID  heparin, 2,000 Units, intra-catheter, After Dialysis  heparin, 2,000  Units, intra-catheter, After Dialysis  heparin, 2,000 Units, intra-catheter, After Dialysis  heparin, 2,000 Units, intra-catheter, After Dialysis  insulin lispro, 0-10 Units, subcutaneous, TID with meals  ipratropium-albuteroL, 3 mL, nebulization, q6h while awake  lidocaine, 5 mL, infiltration, Once  magnesium sulfate, 2 g, intravenous, Once  meropenem, 500 mg, intravenous, q12h  midodrine, 15 mg, oral, TID with meals  nystatin, , Topical, BID  nystatin, , Topical, BID  pantoprazole, 40 mg, oral, Daily   Or  pantoprazole, 40 mg, intravenous, Daily  perflutren lipid microspheres, 0.5-10 mL of dilution, intravenous, Once in imaging  perflutren protein A microsphere, 0.5 mL, intravenous, Once in imaging  psyllium, 1 packet, oral, BID  sodium chloride, 3 mL, nebulization, TID  sulfur hexafluoride microsphr, 2 mL, intravenous, Once in imaging  sulfur hexafluoride microsphr, 2 mL, intravenous, Once in imaging  vancomycin, 125 mg, oral, BID  zinc oxide, 1 Application, Topical, BID       Continuous Medications:   norepinephrine, 0.01-3 mcg/kg/min, Last Rate: Stopped (02/12/24 1700)       PRN Medications:     Labs     Results from last 72 hours   Lab Units 02/13/24  0440 02/12/24  0523 02/11/24  0423   GLUCOSE mg/dL 183* 166* 190*   SODIUM mmol/L 130* 136 131*   POTASSIUM mmol/L 3.7 3.4 3.3*   CHLORIDE mmol/L 100 102 99   CO2 mmol/L 20* 24 20*   BUN mg/dL 17 9 16   CREATININE mg/dL 2.40* 1.60 2.70*   EGFR mL/min/1.73m*2 21* 34* 18*   CALCIUM mg/dL 8.3* 8.4* 8.6   ALBUMIN g/dL 2.6*  --   --    MAGNESIUM mg/dL 1.50*  --   --    PHOSPHORUS mg/dL 2.5  --   --      Results from last 72 hours   Lab Units 02/13/24  0440   ALK PHOS U/L 140*   ALT U/L 6   AST U/L 14   BILIRUBIN TOTAL mg/dL 0.6   PROTEIN TOTAL g/dL 5.6*               Results from last 72 hours   Lab Units 02/13/24  0440 02/12/24  0523 02/11/24  0423   WBC AUTO x10*3/uL 10.7 10.3 11.0   NRBC AUTO /100 WBCs 0.0 0.0 0.0   RBC AUTO x10*6/uL 2.50* 2.64* 2.52*   HEMOGLOBIN  g/dL 7.8* 8.2* 7.9*   HEMATOCRIT % 25.3* 26.5* 25.4*   MCV fL 101* 100 101*   MCH pg 31.2 31.1 31.3   MCHC g/dL 30.8* 30.9* 31.1*   RDW % 22.3* 22.8* 22.8*   PLATELETS AUTO x10*3/uL 191 173 228         Lab Results   Component Value Date    BLOODCULT No growth at 4 days -  FINAL REPORT 01/19/2024    BLOODCULT No growth at 4 days -  FINAL REPORT 01/19/2024    GRAMSTAIN (4+) Abundant Polymorphonuclear leukocytes (A) 02/10/2024    GRAMSTAIN (3+) Moderate Gram negative bacilli (A) 02/10/2024     Lab Results   Component Value Date    URINECULTURE >100,000 Proteus mirabilis (A) 01/19/2024       Imaging and Diagnostic Studies     Lab/Radiology/Diagnostic Review:  Results for orders placed or performed during the hospital encounter of 01/19/24 (from the past 24 hour(s))   POCT GLUCOSE   Result Value Ref Range    POCT Glucose 190 (H) 74 - 99 mg/dL   POCT GLUCOSE   Result Value Ref Range    POCT Glucose 133 (H) 74 - 99 mg/dL   POCT GLUCOSE   Result Value Ref Range    POCT Glucose 170 (H) 74 - 99 mg/dL   Hepatitis B core antibody, IgM   Result Value Ref Range    Hepatitis B Core AB; IgM Nonreactive Nonreactive   Hepatitis B core antibody, total   Result Value Ref Range    Hepatitis B Core AB- Total Nonreactive Nonreactive   Hepatitis B surface antibody   Result Value Ref Range    Hepatitis B Surface AB 4.4 <10.0 mIU/mL   Hepatitis B surface antigen   Result Value Ref Range    Hepatitis B Surface AG Nonreactive Nonreactive   CBC and Auto Differential   Result Value Ref Range    WBC 10.7 4.4 - 11.3 x10*3/uL    nRBC 0.0 0.0 - 0.0 /100 WBCs    RBC 2.50 (L) 4.00 - 5.20 x10*6/uL    Hemoglobin 7.8 (L) 12.0 - 16.0 g/dL    Hematocrit 25.3 (L) 36.0 - 46.0 %     (H) 80 - 100 fL    MCH 31.2 26.0 - 34.0 pg    MCHC 30.8 (L) 32.0 - 36.0 g/dL    RDW 22.3 (H) 11.5 - 14.5 %    Platelets 191 150 - 450 x10*3/uL    Neutrophils % 60.6 40.0 - 80.0 %    Immature Granulocytes %, Automated 0.8 0.0 - 0.9 %    Lymphocytes % 21.9 13.0 - 44.0 %     Monocytes % 11.2 2.0 - 10.0 %    Eosinophils % 4.7 0.0 - 6.0 %    Basophils % 0.8 0.0 - 2.0 %    Neutrophils Absolute 6.47 (H) 1.60 - 5.50 x10*3/uL    Immature Granulocytes Absolute, Automated 0.09 0.00 - 0.50 x10*3/uL    Lymphocytes Absolute 2.33 0.80 - 3.00 x10*3/uL    Monocytes Absolute 1.19 (H) 0.05 - 0.80 x10*3/uL    Eosinophils Absolute 0.50 (H) 0.00 - 0.40 x10*3/uL    Basophils Absolute 0.08 0.00 - 0.10 x10*3/uL   Comprehensive Metabolic Panel   Result Value Ref Range    Glucose 183 (H) 65 - 99 mg/dL    Sodium 130 (L) 133 - 145 mmol/L    Potassium 3.7 3.4 - 5.1 mmol/L    Chloride 100 97 - 107 mmol/L    Bicarbonate 20 (L) 24 - 31 mmol/L    Urea Nitrogen 17 8 - 25 mg/dL    Creatinine 2.40 (H) 0.40 - 1.60 mg/dL    eGFR 21 (L) >60 mL/min/1.73m*2    Calcium 8.3 (L) 8.5 - 10.4 mg/dL    Albumin 2.6 (L) 3.5 - 5.0 g/dL    Alkaline Phosphatase 140 (H) 35 - 125 U/L    Total Protein 5.6 (L) 5.9 - 7.9 g/dL    AST 14 5 - 40 U/L    Bilirubin, Total 0.6 0.1 - 1.2 mg/dL    ALT 6 5 - 40 U/L    Anion Gap 10 <=19 mmol/L   Magnesium   Result Value Ref Range    Magnesium 1.50 (L) 1.60 - 3.10 mg/dL   Phosphorus   Result Value Ref Range    Phosphorus 2.5 2.5 - 4.5 mg/dL   Morphology   Result Value Ref Range    RBC Morphology No significant RBC morphology present    POCT GLUCOSE   Result Value Ref Range    POCT Glucose 158 (H) 74 - 99 mg/dL     Upper extremity venous duplex bilateral    Result Date: 1/25/2024           [facility] [id number] [place] [address]            Phone [phone]  Vascular Lab Report  [facility] UPPER EXTREMITY VENOUS DUPLEX BILATERAL Patient Name:      BASIA Gray Physician:  [id number] Mini Busby MD, RPVI Study Date:        1/25/2024           Ordering Provider:  [id number] ERI BERMAN MRN/PID:           [id number]            Fellow: Accession#:        [id number]        Technologist:       Dara Parr RVT  Date of Birth/Age: 1950 / 73 years Technologist 2: Gender:            F                   Encounter#:         [id number] Admission Status:  Inpatient           Location Performed: [facility]  Diagnosis/ICD: Right arm swelling-M79.89; Left arm swelling-M79.89 CPT Codes:     70216 Peripheral venous duplex scan for DVT complete  CONCLUSIONS:  Right Upper Venous: No evidence of acute deep vein thrombus visualized in the right upper extremity. Internal jugular vein was visualized in segments due to IV lines and bandages. Left Upper Venous: No evidence of acute deep vein thrombus visualized in the left upper extremity. Subclavian stent is noted and appears patent. There is a known occluded dialysis access noted.  Additional Findings: Technically difficult exam due to IV lines, bandages, and patient's positioning.  Imaging & Doppler Findings:  Right               Compressible Thrombus        Flow Internal Jugular        Yes        None   Spontaneous/Phasic Subclavian              Yes        None Subclavian Proximal     Yes        None   Spontaneous/Phasic Subclavian Mid          Yes        None Subclavian Distal       Yes        None   Spontaneous/Phasic Axillary                Yes        None       Pulsatile Brachial                Yes        None Cephalic                Yes        None Basilic                 Yes        None  Left                Compress Thrombus        Flow Internal Jugular      Yes      None       Pulsatile Subclavian            Yes      None Subclavian Proximal   Yes      None       Pulsatile Subclavian Mid        Yes      None       Pulsatile Subclavian Distal     Yes      None       Pulsatile Axillary              Yes      None   Spontaneous/Phasic Brachial              Yes      None Cephalic              Yes      None Basilic               Yes      None  [id number] Mini Busby MD, ALICE Electronically signed by [id number] Mini Busby MD, ALICE on 1/25/2024 at 4:06:13 PM  ** Final **     ECG  12 lead    Result Date: 1/24/2024  Sinus tachycardia  with 1st degree AV block Right bundle branch block Possible Lateral infarct , age undetermined Abnormal ECG Confirmed by Yesika Nj ([id number]) on 1/24/2024 6:54:45 AM    XR tibia fibula right 2 views    Result Date: 1/22/2024  Interpreted By:  Real Mendieta, STUDY: XR TIBIA FIBULA RIGHT 2 VIEWS; 1/22/2024 2:15 pm   INDICATION: Signs/Symptoms:evaluate source of infection, osteomyelitis;   COMPARISON: None available.   ACCESSION NUMBER(S): [id number]   ORDERING CLINICIAN: BAN GIFFORD   TECHNIQUE: Views: AP and Lateral of the right tibia and fibula   FINDINGS: RESULT: There is no evidence for fracture or dislocation. Tricompartmental degenerative changes of the right knee. The tibia and fibula appear intact without bony erosion or evidence for osteomyelitis. No other bony or soft tissue abnormality is identified. No subcutaneous gas is noted.       No evidence for acute osseous abnormality. No bony erosion to suggest osteomyelitis.   Signed by: Real Mendieta 1/22/2024 3:15 PM Dictation workstation:   [id number]    XR ankle right 3+ views    Result Date: 1/22/2024  Interpreted By:  Real Mendieta, STUDY: XR ANKLE RIGHT 3+ VIEWS; 1/22/2024 2:15 pm   INDICATION: Signs/Symptoms:Evuluate source of infection, osteomyelitis;   COMPARISON: None available.   ACCESSION NUMBER(S): [id number]   ORDERING CLINICIAN: BAN GIFFORD   TECHNIQUE: Views: AP, Lateral, Oblique, right ankle   FINDINGS: RESULT: There is no evidence for fracture or dislocation. The ankle mortise is intact. Joint spaces appear adequately maintained. No bony erosion to suggest osteomyelitis. No bone lesion or soft tissue abnormality is identified. No subcutaneous gas is seen.       No evidence for acute osseous abnormality. No bony erosion to suggest osteomyelitis.   Signed by: Real Mendieta 1/22/2024 3:14 PM Dictation workstation:   [id number]    Transthoracic Echo (TTE) Complete    Result  Date: 1/22/2024           [facility] [id number] [place], [place] [address]            Phone [phone] TRANSTHORACIC ECHOCARDIOGRAM REPORT  Patient Name:      BASIA VEGA      Isaac Physician:   [id number] Fausto Rowe DO Study Date:        1/22/2024           Ordering Provider:   [id number] ERI BERMAN MRN/PID:           [id number]            Fellow: Accession#:        [id number]        Nurse: Date of Birth/Age: 1950 / 73 years Sonographer:         Tabatha Page RDCS Gender:            F                   Additional Staff: Height:            157.00 cm           Admit Date: Weight:            75.00 kg            Admission Status:    Inpatient - Routine BSA:               1.76 m2             Department Location: [facility] Blood Pressure: 88 /49 mmHg Study Type:    TRANSTHORACIC ECHO (TTE) COMPLETE Diagnosis/ICD: Chronic combined systolic (congestive) and diastolic (congestive)                heart failure (CHF)-I50.42; Sepsis, unspecified organism-A41.9 Indication:    heart failure,septic shock CPT Codes:     Echo Complete w Full Doppler-08506 Patient History: BMI:               Obese >30 Pertinent History: Sepsis, PVD, A-Fib, CVA, Cancer and HTN. breast                    prosthesis/ca, ESRD,anemia,septic shock,heart failure. Study Detail: The following Echo studies were performed: 2D, M-Mode, Doppler and               color flow. Technically challenging study due to prosthesis,               prominent lung artifact and body habitus.  PHYSICIAN INTERPRETATION: Left Ventricle: Left ventricular systolic function is normal, with an estimated ejection fraction of 70%. There are no regional wall motion abnormalities. The left ventricular cavity size is normal. Left ventricular diastolic filling was indeterminate. Left Atrium: The left atrium is moderately dilated. Right Ventricle: The right ventricle is normal in size. There is normal  right ventricular global systolic function. Right Atrium: The right atrium is normal in size. Aortic Valve: The aortic valve is trileaflet. There is no evidence of aortic valve regurgitation. The peak instantaneous gradient of the aortic valve is 2.8 mmHg. Mitral Valve: The mitral valve is normal in structure. There is no evidence of mitral valve regurgitation. Tricuspid Valve: The tricuspid valve is structurally normal. There is trace tricuspid regurgitation. Pulmonic Valve: The pulmonic valve is not well visualized. The pulmonic valve regurgitation was not well visualized. Pericardium: There is no pericardial effusion noted. Aorta: The aortic root is normal.  CONCLUSIONS:  1. Left ventricular systolic function is normal with a 70% estimated ejection fraction.  2. The left atrium is moderately dilated.  3. Left ventricular diastolic filling indeterminate. QUANTITATIVE DATA SUMMARY: 2D MEASUREMENTS:                          Normal Ranges: LAs:           4.50 cm   (2.7-4.0cm) IVSd:          0.61 cm   (0.6-1.1cm) LVPWd:         0.88 cm   (0.6-1.1cm) LVIDd:         3.66 cm   (3.9-5.9cm) LV Mass Index: 41.9 g/m2 LV SYSTOLIC FUNCTION BY 2D PLANIMETRY (MOD):                     Normal Ranges: EF-A4C View: 68.3 % (>=55%) LV DIASTOLIC FUNCTION:                     Normal Ranges: MV Peak E: 1.19 m/s (0.7-1.2 m/s) MV Peak A: 0.90 m/s (0.42-0.7 m/s) E/A Ratio: 1.32     (1.0-2.2) MITRAL VALVE:                 Normal Ranges: MV DT: 192 msec (150-240msec) AORTIC VALVE:                         Normal Ranges: AoV Vmax:      0.84 m/s (<=1.7m/s) AoV Peak P.8 mmHg (<20mmHg) LVOT Max Shimon:  0.47 m/s (<=1.1m/s) LVOT Diameter: 1.90 cm  (1.8-2.4cm) AoV Area,Vmax: 1.57 cm2 (2.5-4.5cm2) TRICUSPID VALVE/RVSP:                   Normal Ranges: IVC Diam: 1.05 cm  [id number] [facility]usto [facility] DENNIS Electronically signed on 2024 at 2:53:04 PM  ** Final **     XR foot left 3+ views    Result Date: 2024  Interpreted By:  Real Mendieta, STUDY:  XR FOOT LEFT 1-2 VIEWS; 1/21/2024 4:15 pm   INDICATION: Signs/Symptoms:osteomyelitis. Pain   COMPARISON: 12/07/2023   ACCESSION NUMBER(S): [id number]   ORDERING CLINICIAN: BAN GIFFORD   TECHNIQUE: Views:  AP, Lat, Oblique, left foot   FINDINGS: RESULT: There is no evidence for fracture or dislocation. 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 Mild hammertoe deformities. Joint spaces appear adequately maintained. Soft tissue ulceration of the posterior aspect of the heel however no evidence for bony erosion to suggest osteomyelitis.       No evidence for acute fracture or acute bony erosion to suggest osteomyelitis. Chronic changes as described.   Signed by: Real Mendieta 1/21/2024 7:15 PM Dictation workstation:   [id number]    CT chest abdomen pelvis wo IV contrast    Result Date: 1/21/2024  Interpreted By:  Maria Garcia, STUDY: CT CHEST ABDOMEN PELVIS WO CONTRAST;  1/20/2024 11:42 pm   INDICATION: Mental status change. Elevated white blood cell count with infectious workup.   COMPARISON: 08/20/2023   ACCESSION NUMBER(S): [id number]   ORDERING CLINICIAN: BAN GIFFORD   TECHNIQUE: CT of the chest, abdomen and pelvis was performed with no oral or intravenous contrast administered. Sagittal and coronal reformations were completed by the technologist at the acquisition scanner.   All CT examinations are performed with 1 or more of the following dose reduction techniques: Automated exposure control, adjustment of mA and/or kv according to patient's size, or use of iterative reconstruction techniques.   FINDINGS: Please note that the study is limited without intravenous contrast.   CHEST: Bilateral pleural effusions are present with right effusion moderately small in size and with the left effusion moderately small in size as well. There is shift of the heart and mediastinum to the left of midline due to atelectasis of the left upper lobe. There is consolidation throughout left lower lobe  with air bronchograms noted. On the right, there is compressive atelectasis seen posteriorly in the right lower lobe.   There is mild reactive mediastinal adenopathy seen at this time with mediastinal lymph nodes increased in size since prior study. Several of these mediastinal nodes are at the upper limits of normal measuring 8 mm in short axis diameter.   No pericardial effusion is present. The cardiac size is normal with mitral annulus calcification.   There has been prior bilateral mastectomy with reconstruction of the left breast with implant placement. Surgical clips are visible within the left axilla. Stent grafts are visible within the left upper extremity and within the left innominate, subclavian as well as axillary veins.   A peripherally calcified 1.8 cm nodule arises from the lower pole of left thyroid lobe.   ABDOMEN:   LIVER: Unremarkable.   BILE DUCTS: No intrahepatic or extrahepatic biliary ductal dilatation is seen.   GALLBLADDER: Cholelithiasis is observed with some calcification of the gallbladder wall demonstrated as well. No gallbladder wall thickening or pericholecystic fluid is evident.   PANCREAS: Unremarkable   SPLEEN: Unremarkable   ADRENAL GLANDS: Unremarkable   KIDNEYS AND URETERS: Kidneys are small in size with extensive renal artery calcification demonstrated.   PELVIS:   BLADDER: Amos catheter is present within the decompressed urinary bladder.   REPRODUCTIVE ORGANS: Calcification of the arcuate arteries within the uterus is seen. There is no uterine enlargement or adnexal mass.   BOWEL: A rectal balloon is seen. There is no abnormal distention of the intestinal tract.   VESSELS: There is atherosclerosis of the thoracoabdominal aorta and iliac arteries with calcification in the walls of the celiac, splenic, hepatic, and mesenteric arteries.   PERITONEUM/RETROPERITONEUM/LYMPH NODES: There is a minimal amount of ascites seen about the liver and spleen with mild presacral edema noted.    ABDOMINAL WALL: There is anasarca involving the soft tissues of the abdomen and pelvis. Postoperative change from ventral hernia repair is seen.   BONES: Bilateral sacroiliitis is seen. There is lumbar dextroscoliosis. Chronic rotator cuff tear is present bilaterally with osteoarthritis of both shoulders, right greater than left.       Chest 1.  Moderately small bilateral pleural effusions with left upper lobe atelectasis and compressive atelectasis seen posteriorly in right lower lobe. A left lower lobe pneumonia is identified. There is extensive airspace consolidation within left lower lobe with air bronchograms observed. Mild mediastinal reactive adenopathy is present as well.   Abdomen-Pelvis 1.  Cholelithiasis without evidence of cholecystitis. 2. Small amount of ascites with mild presacral edema and anasarca within the soft tissues of the abdominal wall. 3. Renal atrophy with extensive vascular calcifications.     MACRO: None   Signed by: Maria Garcia 1/21/2024 8:30 AM Dictation workstation:   [id number]    CT head wo IV contrast    Result Date: 1/21/2024  Interpreted By:  Maria Garcia, STUDY: CT HEAD WO IV CONTRAST 1/20/2024 11:42 pm   INDICATION: Signs/Symptoms:mental status changes   COMPARISON: 12/11/2023   ACCESSION NUMBER(S): [id number]   ORDERING CLINICIAN: BAN GIFFORD   TECHNIQUE: Unenhanced axial images of the brain are completed.   All CT examinations are performed with 1 or more of the following dose reduction techniques: Automated exposure control, adjustment of mA and/or kv according to patient's size, or use of iterative reconstruction techniques.   FINDINGS: Helical unenhanced axial images of the brain demonstrate a mild to moderate degree of ventricular enlargement with proportionate widening of the sulci and sylvian fissures. There is no midline shift, mass effect, extra-axial fluid collection, or acute intracranial hemorrhage. There is diminished density seen in the periventricular white  matter indicating chronic microvascular ischemic disease. There is calcified plaque seen within the distal vertebral and internal carotid arteries bilaterally. No calvarial abnormality is seen.       Atrophy and chronic microvascular ischemic disease without acute intracranial process.   Signed by: Maria Garcia 1/21/2024 8:09 AM Dictation workstation:   [id number]    XR chest 1 view    Result Date: 1/20/2024  Interpreted By:  Brendon Perez, STUDY: XR CHEST 1 VIEW; 1/20/2024 5:03 pm   INDICATION: CLINICAL INFORMATION: Signs/Symptoms:Insertion right IJ central line, also left thoracentesis.   COMPARISON: 01/19/2024 at 1338 hours   ACCESSION NUMBER(S): [id number]   ORDERING CLINICIAN: BOZENA ANTONIO   TECHNIQUE: Portable chest one view.   FINDINGS: The cardiac size is indeterminate in view of the AP projection. There is no change in the right-sided dual port central venous catheter. There is a new right internal jugular venous catheter present with the tip at approximately same level as the dual port central venous catheter, overlying the mid to lower right atrium. There is no evidence for pneumothorax. Patient has a history of left thoracentesis without evidence for pneumothorax on the left. There is bilateral basilar alveolar infiltrate and effusions. Left effusion is decreased compared to the study from 1 day earlier.   Surgical clips are identified within left chest wall. Endovascular stent is identified in the distribution of the left subclavian artery.       1. Status post right-sided central venous catheter placement as described above with the tip overlying the mid to caudal aspect of the right atrium. There is no evidence for pneumothorax. 2. No evidence for left-sided pneumothorax after left-sided thoracentesis. Decrease in the left effusion. 3. Bilateral basilar infiltrates and effusions are present. Follow-up to assure complete clearing is suggested.   MACRO: none   Signed by: Brendon Perez 1/20/2024 5:11  PM Dictation workstation:   [id number]    XR chest 1 view    Result Date: 1/19/2024  Interpreted By:  Real Mendieta, STUDY: XR CHEST 1 VIEW; 1/19/2024 1:38 pm   INDICATION: Signs/Symptoms:dyspnea.   COMPARISON: 12/26/2023   ACCESSION NUMBER(S): [id number]   ORDERING CLINICIAN: EMELY GOLDSMITH   TECHNIQUE: 1 view of the chest was performed.   FINDINGS: There may be a minimal right pleural effusion. Slight prominence of the interstitium otherwise the right lung is clear. Improved appearance of the left lung with improved aeration of the right upper lobe. There is opacification of the left lower lobe possibly due to large pleural effusion which is similar to the prior study. No pneumothorax. Right-sided dual lumen central line catheter with tip at the proximal atrium. The cardiomediastinal silhouette is within normal limits. Left-sided subclavian stents are again noted.       Improved aeration and appearance of the left lung superiorly however there is a persistent large left pleural effusion and opacification of the left lower lobe.   Signed by: Real Mendieta 1/19/2024 1:44 PM Dictation workstation:   [id number]         Assessment / Plan       PROBLEM LIST:  - End stage heart failure with preserved EF - on vasopressors  - End stage renal disease on HD  - Cardiogenic shock - will continue to wean down levo her blood pressure measured 1 does not reflect of the actual blood pressure as the patient is fully awake despite readings of systolic in the 40s and 50s arterial line was also inaccurate due to vasculopathy  - Acute metabolic encephalopathy  - Failure to wean from vasopressors  - Goals of care discussion    MANAGEMENT PLAN:  - Start weaning levo and assess mental status  - received HD today   -Already on Fluorinef tid  - Palliative care consult  - Cardiology consultation - patient's daughter declined a RHC  - Peer to peer done with insurance, NOT approved  - Family will update us with goals of care  - Cont  Doxy and Vanc PO. Meropenem added for GNB in swab culture from right heal   Appreciate ID consult.  - Routine ICU care  - PT/OT  Overall prognosis poor due to chronic medical problems including end-stage renal disease heart failure patient is bedridden with decubitus ulcers  I have personally interviewed and examined the patient.  I have personally verified elements of the exam listed above. Interval changes or irregularities are as noted.  I have personally and independently reviewed laboratory, radiographic and procedural data.  I have personally reviewed the problem list above and concur. Changes, if any, are noted.  I have personally reviewed the plan list above and concur. Changes, if any, are noted.    The patient has high probability of compromise including but not limited to organ system failure, mechanical respiratory and circulatory support, cardiac arrest and death. I have discussed this in detail with the family / caregivers.    No change in plan overall    Tarek Gharibeh MD

## 2024-02-13 NOTE — NURSING NOTE
Patient with Rt arm dual lumen picc, dressing D&I dated 2/12, both lumens flush easily and with positive blood return, curos caps applied. Rt chest dialysis catheters, dressing change done using sterile technique, site with no redness, drainage or swelling, sutures noted to not be intact in skin.

## 2024-02-13 NOTE — PROGRESS NOTES
Ayanna Gross is a 73 y.o. female on day 25 of admission presenting with Septic shock (CMS/HCC).    Subjective   Interval History:   Afebrile, no chills  Off pressors  Awake, alert     Review of Systems   All other systems reviewed and are negative.      Objective   Range of Vitals (last 24 hours)  Heart Rate:  []   Temp:  [36.2 °C (97.2 °F)-37 °C (98.6 °F)]   Resp:  [13-27]   BP: (50-97)/(25-75)   SpO2:  [86 %-100 %]   Daily Weight  02/12/24 : 89.6 kg (197 lb 8.5 oz)    Body mass index is 36.13 kg/m².    Physical Exam  Constitutional:       Appearance: Awake, alert  HENT:      Head: Normocephalic and atraumatic.      Nose: Nose normal.   Eyes:      Extraocular Movements: Extraocular movements intact.      Conjunctiva/sclera: Conjunctivae normal.   Cardiovascular:      Heart sounds: Normal heart sounds, S1 normal and S2 normal.   Pulmonary:      Breath sounds: Decreased breath sounds present.   Abdominal:      General: Bowel sounds are normal.      Palpations: Abdomen is soft.   Musculoskeletal:      Cervical back: Normal range of motion and neck supple.   Skin:     Comments: Stage III sacral ulcer, bilateral posterior heel eschar -photos examined  Neurological:      Mental Status: She is awake, alert    Antibiotics  aspirin tablet 325 mg  acetaminophen (Tylenol) tablet 650 mg  cefepime (Maxipime) 1 g in dextrose 5 % 50 mL IV  sodium chloride 0.9 % bolus 2,229 mL  apixaban (Eliquis) tablet 2.5 mg  escitalopram (Lexapro) tablet 10 mg  febuxostat (Uloric) tablet 40 mg  fenofibrate (Triglide) tablet 160 mg  gabapentin (Neurontin) capsule 100 mg  midodrine (Proamatine) tablet 15 mg  nystatin (Mycostatin) 100,000 unit/gram powder  oxyCODONE-acetaminophen (Percocet) 5-325 mg per tablet 1 tablet  simvastatin (Zocor) tablet 20 mg  piperacillin-tazobactam-dextrose (Zosyn) IV 2.25 g  vancomycin (Vancocin) capsule 125 mg  oxygen (O2) therapy  dextrose 50 % injection 25 g  glucagon (Glucagen) injection 1 mg  dextrose 10  % in water (D10W) infusion  insulin lispro (HumaLOG) injection 0-10 Units  nystatin (Mycostatin) 100,000 unit/gram powder 1 Application  vancomycin-diluent combo no.1 (Xellia) IVPB 1,750 mg  piperacillin-tazobactam-dextrose (Zosyn) IV 2.25 g  sodium chloride 0.9 % bolus 500 mL  norepinephrine (Levophed) 8 mg in dextrose 5% 250 mL (0.032 mg/mL) infusion (premix)  vancomycin (Vancocin) placeholder  pantoprazole (ProtoNix) EC tablet 40 mg  pantoprazole (ProtoNix) injection 40 mg  sennosides-docusate sodium (Judy-Colace) 8.6-50 mg per tablet 1 tablet  doxycycline (Vibramycin) in dextrose 5 % in water (D5W) 100 mL  mg  LORazepam (Ativan) injection 2 mg  magnesium sulfate IV 2 g  zinc oxide 20 % ointment 1 Application  nystatin (Mycostatin) cream  norepinephrine (Levophed) 8 mg in dextrose 5% 250 mL (0.032 mg/mL) infusion (premix)  heparin 1,000 unit/mL injection 2,000 Units  heparin 1,000 unit/mL injection 2,000 Units  nystatin (Mycostatin) ointment  acetaminophen (Tylenol) oral liquid 1,000 mg  albumin human 25 % solution 12.5 g  perflutren lipid microspheres (Definity) injection 0.5-10 mL of dilution  sulfur hexafluoride microsphr (Lumason) injection 24.28 mg  perflutren protein A microsphere (Optison) injection 0.5 mL  ipratropium-albuteroL (Duo-Neb) 0.5-2.5 mg/3 mL nebulizer solution 3 mL  sodium chloride 3 % nebulizer solution 3 mL  vancomycin-diluent combo no.1 (Xellia) IVPB 750 mg  sennosides-docusate sodium (Judy-Colace) 8.6-50 mg per tablet 1 tablet  acetaminophen (Tylenol) tablet 650 mg  doxycycline (Vibramycin) capsule 100 mg  magnesium oxide (Mag-Ox) tablet 400 mg  vasopressin (Vasostrict) 0.2 unit/mL infusion  norepinephrine (Levophed) 8 mg in dextrose 5% 250 mL (0.032 mg/mL) infusion (premix)  heparin 1,000 unit/mL injection 2,000 Units  heparin 1,000 unit/mL injection 2,000 Units  albumin human 25 % solution 12.5 g  vancomycin (Xellia) 1 g in 200 mL (Xellia) IVPB 1 g  ipratropium-albuteroL (Duo-Neb)  0.5-2.5 mg/3 mL nebulizer solution 3 mL  sodium chloride 3 % nebulizer solution 3 mL  albumin human 5 % infusion 12.5 g  heparin 1,000 unit/mL injection 2,000 Units  heparin 1,000 unit/mL injection 2,000 Units  vancomycin (Vancocin) capsule 125 mg  albumin human 25 % solution 12.5 g  heparin 1,000 unit/mL injection 2,000 Units  heparin 1,000 unit/mL injection 2,000 Units  ipratropium-albuteroL (Duo-Neb) 0.5-2.5 mg/3 mL nebulizer solution 3 mL  lidocaine (Xylocaine) 10 mg/mL (1 %) injection 50 mg  sodium phosphate 15 mmol in sodium chloride 0.9% 250 mL IV  sod phos di, mono-K phos mono (K Phos Neutral) tablet 250 mg  potassium, sodium phosphates (Phos-NaK) 280-160-250 mg packet 1 packet  doxycycline (Vibramycin) capsule 100 mg  fludrocortisone (Florinef) tablet 0.1 mg  gabapentin (Neurontin) capsule 100 mg  acetaminophen (Tylenol) tablet 1,000 mg  benzocaine-menthoL (Dermoplast) topical spray  vancomycin (Vancocin) capsule 125 mg  heparin 1,000 unit/mL injection 2,000 Units  heparin 1,000 unit/mL injection 2,000 Units  magnesium sulfate IV 2 g  albumin human 25 % solution 12.5 g  promethazine (Phenergan) injection 12.5 mg  ondansetron (Zofran) injection 4 mg  lidocaine (Xylocaine) 10 mg/mL (1 %) injection 50 mg  alteplase (Cathflo Activase) injection 2 mg  acetaminophen (Tylenol) tablet 650 mg  psyllium (Metamucil) 3.4 gram packet 1 packet  perflutren lipid microspheres (Definity) injection 0.5-10 mL of dilution  sulfur hexafluoride microsphr (Lumason) injection 24.28 mg  perflutren protein A microsphere (Optison) injection 0.5 mL  epoetin di-epbx (Retacrit) injection 10,000 Units  sulfur hexafluoride microsphr (Lumason) injection 24.28 mg  perflutren lipid microspheres (Definity) injection 0.5-10 mL of dilution  perflutren lipid microspheres (Definity) injection 0.5-10 mL of dilution  sulfur hexafluoride microsphr (Lumason) injection 24.28 mg  perflutren protein A microsphere (Optison) injection 0.5 mL  ammonium  lactate (Lac-Hydrin) 12 % lotion 1 Application  norepinephrine (Levophed) 8 mg in dextrose 5% 250 mL (0.032 mg/mL) infusion (premix)  fludrocortisone (Florinef) tablet 0.1 mg  albumin human 25 % solution 25 g  albumin human 25 % solution 25 g  meropenem (Merrem) 500 mg in sodium chloride 0.9 % 100 mL IV  potassium chloride 20 mEq in 100 mL IV premix  potassium chloride 20 mEq in 100 mL IV premix      Relevant Results  Labs  Results from last 72 hours   Lab Units 02/13/24 0440 02/12/24 0523 02/11/24 0423   WBC AUTO x10*3/uL 10.7 10.3 11.0   HEMOGLOBIN g/dL 7.8* 8.2* 7.9*   HEMATOCRIT % 25.3* 26.5* 25.4*   PLATELETS AUTO x10*3/uL 191 173 228   NEUTROS PCT AUTO % 60.6  --   --    LYMPHS PCT AUTO % 21.9  --   --    MONOS PCT AUTO % 11.2  --   --    EOS PCT AUTO % 4.7  --   --      Results from last 72 hours   Lab Units 02/13/24 0440 02/12/24 0523 02/11/24 0423   SODIUM mmol/L 130* 136 131*   POTASSIUM mmol/L 3.7 3.4 3.3*   CHLORIDE mmol/L 100 102 99   CO2 mmol/L 20* 24 20*   BUN mg/dL 17 9 16   CREATININE mg/dL 2.40* 1.60 2.70*   GLUCOSE mg/dL 183* 166* 190*   CALCIUM mg/dL 8.3* 8.4* 8.6   ANION GAP mmol/L 10 10 12   EGFR mL/min/1.73m*2 21* 34* 18*   PHOSPHORUS mg/dL 2.5  --   --      Results from last 72 hours   Lab Units 02/13/24 0440   ALK PHOS U/L 140*   BILIRUBIN TOTAL mg/dL 0.6   PROTEIN TOTAL g/dL 5.6*   ALT U/L 6   AST U/L 14   ALBUMIN g/dL 2.6*     Estimated Creatinine Clearance: 22.4 mL/min (A) (by C-G formula based on SCr of 2.4 mg/dL (H)).  C-Reactive Protein   Date Value Ref Range Status   12/25/2023 5.20 (H) 0.00 - 2.00 mg/dL Final     CRP   Date Value Ref Range Status   06/23/2023 19.9 (H) 0 - 2.0 MG/DL Final     Comment:     Performed at [address] [address]   05/22/2022 1.0 0 - 2.0 MG/DL Final     Comment:     Performed at [address] [address]     Microbiology  Susceptibility data from last 14 days.  Collected Specimen Info Organism Aztreonam  Cefepime Ceftazidime Ciprofloxacin Levofloxacin Piperacillin/Tazobactam Tobramycin   02/10/24 Tissue/Biopsy from Other (specify in comments) Proteus mirabilis            Pseudomonas aeruginosa          02/10/24 Tissue/Biopsy from Wound/Tissue Pseudomonas aeruginosa S S S S S S S     Klebsiella pneumoniae/variicola          Reviewed  Imaging  Transthoracic Echo (TTE) Limited    Result Date: 2/7/2024           [place] [address]            Phone [phone] TRANSTHORACIC ECHOCARDIOGRAM REPORT  Patient Name:      BASIA Gray Physician:    [id number] Littlejohn DO Study Date:        2/7/2024            Ordering Provider:    [id number] SANGITA LITTLEJOHN MRN/PID:           [id number]            Fellow: Accession#:        [id number]        Nurse: Date of Birth/Age: 1950 / 73 years Sonographer:          Rena Talavera ACS,                                                              RDCS, FABIAN Gender:            F                   Additional Staff: Height:            157.48 cm           Admit Date: Weight:                                Admission Status:     Inpatient - Routine BSA:               m2                  Department Location:  [facility] ICU Blood Pressure: 95 /42 mmHg Study Type:    TRANSTHORACIC ECHO (TTE) LIMITED Diagnosis/ICD: Hypotension, unspecified-I95.9; Mitral valve disorder-I05.9 Indication:    Limited to assess for MS, Hypotension CPT Codes:     Echo Limited-48107; Color Doppler-69250; Doppler Limited-02551 Patient History: Pertinent History: Hypotension, hx of MV endocarditis, PAD, DM2 ESRD on Hd. Study Detail: The following Echo studies were performed: 2D, M-Mode, Doppler and               color flow. Technically challenging study due to poor acoustic                windows and L Breast implant. Unable to obtain suprasternal notch               view.  PHYSICIAN INTERPRETATION: Left Ventricle: Left ventricular systolic function is normal. There are no regional wall motion abnormalities. The left ventricular cavity size is normal. Left ventricular diastolic filling was not assessed. Left Atrium: The left atrium is moderate to severely dilated. Right Ventricle: The right ventricle is normal in size. There is normal right ventricular global systolic function. Right Atrium: The right atrium was not well visualized. Aortic Valve: The aortic valve was not assessed. Aortic valve regurgitation was not assessed. Mitral Valve: The mitral valve is abnormal. There is evidence of mild mitral valve stenosis. The doppler estimated mean and peak diastolic pressure gradients are 4.0 mmHg and 6.6 mmHg respectively. There is severe mitral annular calcification. There is no evidence of mitral valve regurgitation. Tricuspid Valve: The tricuspid valve was not assessed. Tricuspid regurgitation was not assessed. Pulmonic Valve: The pulmonic valve is not well visualized. The pulmonic valve regurgitation was not assessed. Pericardium: There is no pericardial effusion noted. Aorta: The aortic root was not assessed.  CONCLUSIONS:  1. Left ventricular systolic function is normal.  2. The left atrium is moderate to severely dilated.  3. There is severe mitral annular calcification. QUANTITATIVE DATA SUMMARY: LV DIASTOLIC FUNCTION:                        Normal Ranges: MV Peak E:    1.23 m/s (0.7-1.2 m/s) MV Peak A:    1.12 m/s (0.42-0.7 m/s) E/A Ratio:    1.10     (1.0-2.2) MV lateral e' 0.04 m/s MV medial e'  0.07 m/s MITRAL VALVE:                      Normal Ranges: MV Vmax:    1.28 m/s (<=1.3m/s) MV peak P.6 mmHg (<5mmHg) MV mean P.0 mmHg (<48mmHg)  RIGHT VENTRICLE: RV Basal 2.97 cm  [id number] Herminio Lemus DO Electronically signed on 2024 at 3:50:21 PM  ** Final **     Transthoracic Echo  (TTE) Limited    Result Date: 2/7/2024           [facility] [id number] [place], [place] [address]            Phone [phone] TRANSTHORACIC ECHOCARDIOGRAM REPORT  Patient Name:      BASIA Gray Physician:    [id number] Herminio Lemus DO Study Date:        2/6/2024            Ordering Provider:    [id number] REBECA RESENDEZ MRN/PID:           [id number]            Fellow: Accession#:        [id number]        Nurse: Date of Birth/Age: 1950 / 73 years Sonographer:          Jennifer WALL Gender:            F                   Additional Staff: Height:            157.48 cm           Admit Date:           2/6/2024 Weight:            98.88 kg            Admission Status:     Inpatient - Routine BSA:               1.98 m2             Department Location: Blood Pressure: 59 /49 mmHg Study Type:    TRANSTHORACIC ECHO (TTE) LIMITED Diagnosis/ICD: Acute on chronic diastolic (congestive) heart failure                (CHF)-I50.33 Indication:    Acute on chronic diastolic congestive heart failure CPT Codes:     Echo Limited-79377; Color Doppler-21958; Doppler Limited-18554 Patient History: Pertinent History: PAF Peripheralvascular disease low blood pressure HTN Breast                    Cancer Mixed Hyperlipidemia HF CVA Hypotension DMII CAD CKD. Study Detail: The following Echo studies were performed: 2D, M-Mode, Doppler and               color flow. Technically challenging study due to body habitus,               patient lying in supine position, poor acoustic windows, prominent               lung artifact and Breast Prosthesis. Definity used as a contrast               agent for endocardial border definition. Unable to obtain               suprasternal notch view.  PHYSICIAN INTERPRETATION: Left Ventricle: Left ventricular systolic function is normal, with an estimated  ejection fraction of 60-65%. There are no regional wall motion abnormalities. The left ventricular cavity size is normal. Left ventricular diastolic filling was indeterminate. Left Atrium: The left atrium is moderately dilated. Right Ventricle: The right ventricle is normal in size. There is normal right ventricular global systolic function. Right Atrium: The right atrium is normal in size. Aortic Valve: The aortic valve is trileaflet. There is no evidence of aortic valve regurgitation. The peak instantaneous gradient of the aortic valve is 5.3 mmHg. Mitral Valve: The mitral valve is normal in structure. There is severe mitral annular calcification. There is trace mitral valve regurgitation. Tricuspid Valve: The tricuspid valve is structurally normal. There is mild tricuspid regurgitation. Pulmonic Valve: The pulmonic valve is not well visualized. The pulmonic valve regurgitation was not well visualized. Pericardium: There is no pericardial effusion noted. Aorta: The aortic root is normal.  CONCLUSIONS:  1. Left ventricular systolic function is normal with a 60-65% estimated ejection fraction.  2. The left atrium is moderately dilated.  3. There is severe mitral annular calcification. QUANTITATIVE DATA SUMMARY: 2D MEASUREMENTS:                          Normal Ranges: LAs:           3.80 cm   (2.7-4.0cm) IVSd:          0.92 cm   (0.6-1.1cm) LVPWd:         1.09 cm   (0.6-1.1cm) LVIDd:         2.98 cm   (3.9-5.9cm) LVIDs:         2.12 cm LV Mass Index: 41.2 g/m2 LV % FS        28.9 % LA VOLUME:                               Normal Ranges: LA Vol A4C:        58.3 ml    (22+/-6mL/m2) LA Vol A2C:        60.9 ml LA Vol BP:         62.1 ml LA Vol Index A4C:  29.4ml/m2 LA Vol Index A2C:  30.7 ml/m2 LA Vol Index BP:   31.3 ml/m2 LA Area A4C:       20.5 cm2 LA Area A2C:       20.1 cm2 LA Major Axis A4C: 6.1 cm LA Major Axis A2C: 5.6 cm LA Volume Index:   29.3 ml/m2 LA Vol A4C:        53.1 ml LA Vol A2C:        58.4 ml LV  SYSTOLIC FUNCTION BY 2D PLANIMETRY (MOD):                     Normal Ranges: EF-A4C View: 68.2 % (>=55%) EF-A2C View: 65.9 % EF-Biplane:  67.8 % AORTIC VALVE:                         Normal Ranges: AoV Vmax:      1.15 m/s (<=1.7m/s) AoV Peak P.3 mmHg (<20mmHg) LVOT Max Shimon:  1.11 m/s (<=1.1m/s) LVOT VTI:      16.20 cm LVOT Diameter: 1.90 cm  (1.8-2.4cm) AoV Area,Vmax: 2.74 cm2 (2.5-4.5cm2) PULMONIC VALVE:                         Normal Ranges: PV Accel Time: 63 msec  (>120ms) PV Max Shimon:    1.0 m/s  (0.6-0.9m/s) PV Max P.3 mmHg  [id number] Herminio Lemus DO Electronically signed on 2024 at 7:58:25 AM  ** Final **     Upper extremity venous duplex bilateral    Result Date: 2024           [place] [address]            Phone [phone]  Vascular Lab Report  [facility] UPPER EXTREMITY VENOUS DUPLEX BILATERAL Patient Name:      BASIA Gray Physician:  [id number] Mini Busby MD, RPVI Study Date:        2024           Ordering Provider:  [id number] ERI BERMAN MRN/PID:           [id number]            Fellow: Accession#:        [id number]        Technologist:       Dara Parr RVRICHIE Date of Birth/Age: 1950 / 73 years Technologist 2: Gender:            F                   Encounter#:         [id number] Admission Status:  Inpatient           Location Performed: [facility]  Diagnosis/ICD: Right arm swelling-M79.89; Left arm swelling-M79.89 CPT Codes:     66391 Peripheral venous duplex scan for DVT complete  CONCLUSIONS:  Right Upper Venous: No evidence of acute deep vein thrombus visualized in the right upper extremity. Internal jugular vein was visualized in segments due to IV lines and bandages. Left Upper Venous: No evidence of acute deep vein thrombus visualized in the left upper extremity. Subclavian stent is noted and appears patent. There is a known occluded dialysis  access noted.  Additional Findings: Technically difficult exam due to IV lines, bandages, and patient's positioning.  Imaging & Doppler Findings:  Right               Compressible Thrombus        Flow Internal Jugular        Yes        None   Spontaneous/Phasic Subclavian              Yes        None Subclavian Proximal     Yes        None   Spontaneous/Phasic Subclavian Mid          Yes        None Subclavian Distal       Yes        None   Spontaneous/Phasic Axillary                Yes        None       Pulsatile Brachial                Yes        None Cephalic                Yes        None Basilic                 Yes        None  Left                Compress Thrombus        Flow Internal Jugular      Yes      None       Pulsatile Subclavian            Yes      None Subclavian Proximal   Yes      None       Pulsatile Subclavian Mid        Yes      None       Pulsatile Subclavian Distal     Yes      None       Pulsatile Axillary              Yes      None   Spontaneous/Phasic Brachial              Yes      None Cephalic              Yes      None Basilic               Yes      None  [id number] Mini Busby MD, RPVI Electronically signed by [id number] Mini Busby MD, RPVI on 1/25/2024 at 4:06:13 PM  ** Final **     ECG 12 lead    Result Date: 1/24/2024  Sinus tachycardia  with 1st degree AV block Right bundle branch block Possible Lateral infarct , age undetermined Abnormal ECG Confirmed by Yesika Nj ([id number]) on 1/24/2024 6:54:45 AM    XR tibia fibula right 2 views    Result Date: 1/22/2024  Interpreted By:  Real Mendieta, STUDY: XR TIBIA FIBULA RIGHT 2 VIEWS; 1/22/2024 2:15 pm   INDICATION: Signs/Symptoms:evaluate source of infection, osteomyelitis;   COMPARISON: None available.   ACCESSION NUMBER(S): [id number]   ORDERING CLINICIAN: BAN GIFFORD   TECHNIQUE: Views: AP and Lateral of the right tibia and fibula   FINDINGS: RESULT: There is no evidence for fracture or dislocation. Tricompartmental degenerative  changes of the right knee. The tibia and fibula appear intact without bony erosion or evidence for osteomyelitis. No other bony or soft tissue abnormality is identified. No subcutaneous gas is noted.       No evidence for acute osseous abnormality. No bony erosion to suggest osteomyelitis.   Signed by: Real Mendieta 1/22/2024 3:15 PM Dictation workstation:   [id number]    XR ankle right 3+ views    Result Date: 1/22/2024  Interpreted By:  Real Mendieta, STUDY: XR ANKLE RIGHT 3+ VIEWS; 1/22/2024 2:15 pm   INDICATION: Signs/Symptoms:Evuluate source of infection, osteomyelitis;   COMPARISON: None available.   ACCESSION NUMBER(S): [id number]   ORDERING CLINICIAN: BAN GIFFORD   TECHNIQUE: Views: AP, Lateral, Oblique, right ankle   FINDINGS: RESULT: There is no evidence for fracture or dislocation. The ankle mortise is intact. Joint spaces appear adequately maintained. No bony erosion to suggest osteomyelitis. No bone lesion or soft tissue abnormality is identified. No subcutaneous gas is seen.       No evidence for acute osseous abnormality. No bony erosion to suggest osteomyelitis.   Signed by: Real Mendieta 1/22/2024 3:14 PM Dictation workstation:   [id number]    Transthoracic Echo (TTE) Complete    Result Date: 1/22/2024           [place] [address]            Phone [phone] TRANSTHORACIC ECHOCARDIOGRAM REPORT  Patient Name:      BASIA GUAN GARY Gray Physician:   [id number] [facility] Study Date:        1/22/2024           Ordering Provider:   [id number] ERI BERMAN MRN/PID:           [id number]            Fellow: Accession#:        [id number]        Nurse: Date of Birth/Age: 1950 / 73 years Sonographer:         Tabatha Page RDCS Gender:            F                   Additional Staff: Height:            157.00 cm           Admit Date: Weight:            75.00 kg            Admission  Status:    Inpatient - Routine BSA:               1.76 m2             Department Location: [facility] ICU Blood Pressure: 88 /49 mmHg Study Type:    TRANSTHORACIC ECHO (TTE) COMPLETE Diagnosis/ICD: Chronic combined systolic (congestive) and diastolic (congestive)                heart failure (CHF)-I50.42; Sepsis, unspecified organism-A41.9 Indication:    heart failure,septic shock CPT Codes:     Echo Complete w Full Doppler-53463 Patient History: BMI:               Obese >30 Pertinent History: Sepsis, PVD, A-Fib, CVA, Cancer and HTN. breast                    prosthesis/ca, ESRD,anemia,septic shock,heart failure. Study Detail: The following Echo studies were performed: 2D, M-Mode, Doppler and               color flow. Technically challenging study due to prosthesis,               prominent lung artifact and body habitus.  PHYSICIAN INTERPRETATION: Left Ventricle: Left ventricular systolic function is normal, with an estimated ejection fraction of 70%. There are no regional wall motion abnormalities. The left ventricular cavity size is normal. Left ventricular diastolic filling was indeterminate. Left Atrium: The left atrium is moderately dilated. Right Ventricle: The right ventricle is normal in size. There is normal right ventricular global systolic function. Right Atrium: The right atrium is normal in size. Aortic Valve: The aortic valve is trileaflet. There is no evidence of aortic valve regurgitation. The peak instantaneous gradient of the aortic valve is 2.8 mmHg. Mitral Valve: The mitral valve is normal in structure. There is no evidence of mitral valve regurgitation. Tricuspid Valve: The tricuspid valve is structurally normal. There is trace tricuspid regurgitation. Pulmonic Valve: The pulmonic valve is not well visualized. The pulmonic valve regurgitation was not well visualized. Pericardium: There is no pericardial effusion noted. Aorta: The aortic root is normal.  CONCLUSIONS:  1. Left ventricular systolic  function is normal with a 70% estimated ejection fraction.  2. The left atrium is moderately dilated.  3. Left ventricular diastolic filling indeterminate. QUANTITATIVE DATA SUMMARY: 2D MEASUREMENTS:                          Normal Ranges: LAs:           4.50 cm   (2.7-4.0cm) IVSd:          0.61 cm   (0.6-1.1cm) LVPWd:         0.88 cm   (0.6-1.1cm) LVIDd:         3.66 cm   (3.9-5.9cm) LV Mass Index: 41.9 g/m2 LV SYSTOLIC FUNCTION BY 2D PLANIMETRY (MOD):                     Normal Ranges: EF-A4C View: 68.3 % (>=55%) LV DIASTOLIC FUNCTION:                     Normal Ranges: MV Peak E: 1.19 m/s (0.7-1.2 m/s) MV Peak A: 0.90 m/s (0.42-0.7 m/s) E/A Ratio: 1.32     (1.0-2.2) MITRAL VALVE:                 Normal Ranges: MV DT: 192 msec (150-240msec) AORTIC VALVE:                         Normal Ranges: AoV Vmax:      0.84 m/s (<=1.7m/s) AoV Peak P.8 mmHg (<20mmHg) LVOT Max Shimon:  0.47 m/s (<=1.1m/s) LVOT Diameter: 1.90 cm  (1.8-2.4cm) AoV Area,Vmax: 1.57 cm2 (2.5-4.5cm2) TRICUSPID VALVE/RVSP:                   Normal Ranges: IVC Diam: 1.05 cm  [id number] [facility]  Electronically signed on 2024 at 2:53:04 PM  ** Final **     XR foot left 3+ views    Result Date: 2024  Interpreted By:  Real Mendieta, STUDY: XR FOOT LEFT 1-2 VIEWS; 2024 4:15 pm   INDICATION: Signs/Symptoms:osteomyelitis. Pain   COMPARISON: 2023   ACCESSION NUMBER(S): [id number]   ORDERING CLINICIAN: BAN GIFFORD   TECHNIQUE: Views:  AP, Lat, Oblique, left foot   FINDINGS: RESULT: There is no evidence for fracture or dislocation. 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 Mild hammertoe deformities. Joint spaces appear adequately maintained. Soft tissue ulceration of the posterior aspect of the heel however no evidence for bony erosion to suggest osteomyelitis.       No evidence for acute fracture or acute bony erosion to suggest osteomyelitis. Chronic changes as described.   Signed by: Real Mendieta  1/21/2024 7:15 PM Dictation workstation:   [id number]    CT chest abdomen pelvis wo IV contrast    Result Date: 1/21/2024  Interpreted By:  Maria Garcia, STUDY: CT CHEST ABDOMEN PELVIS WO CONTRAST;  1/20/2024 11:42 pm   INDICATION: Mental status change. Elevated white blood cell count with infectious workup.   COMPARISON: 08/20/2023   ACCESSION NUMBER(S): [id number]   ORDERING CLINICIAN: BAN GIFOFRD   TECHNIQUE: CT of the chest, abdomen and pelvis was performed with no oral or intravenous contrast administered. Sagittal and coronal reformations were completed by the technologist at the acquisition scanner.   All CT examinations are performed with 1 or more of the following dose reduction techniques: Automated exposure control, adjustment of mA and/or kv according to patient's size, or use of iterative reconstruction techniques.   FINDINGS: Please note that the study is limited without intravenous contrast.   CHEST: Bilateral pleural effusions are present with right effusion moderately small in size and with the left effusion moderately small in size as well. There is shift of the heart and mediastinum to the left of midline due to atelectasis of the left upper lobe. There is consolidation throughout left lower lobe with air bronchograms noted. On the right, there is compressive atelectasis seen posteriorly in the right lower lobe.   There is mild reactive mediastinal adenopathy seen at this time with mediastinal lymph nodes increased in size since prior study. Several of these mediastinal nodes are at the upper limits of normal measuring 8 mm in short axis diameter.   No pericardial effusion is present. The cardiac size is normal with mitral annulus calcification.   There has been prior bilateral mastectomy with reconstruction of the left breast with implant placement. Surgical clips are visible within the left axilla. Stent grafts are visible within the left upper extremity and within the left innominate,  subclavian as well as axillary veins.   A peripherally calcified 1.8 cm nodule arises from the lower pole of left thyroid lobe.   ABDOMEN:   LIVER: Unremarkable.   BILE DUCTS: No intrahepatic or extrahepatic biliary ductal dilatation is seen.   GALLBLADDER: Cholelithiasis is observed with some calcification of the gallbladder wall demonstrated as well. No gallbladder wall thickening or pericholecystic fluid is evident.   PANCREAS: Unremarkable   SPLEEN: Unremarkable   ADRENAL GLANDS: Unremarkable   KIDNEYS AND URETERS: Kidneys are small in size with extensive renal artery calcification demonstrated.   PELVIS:   BLADDER: Amos catheter is present within the decompressed urinary bladder.   REPRODUCTIVE ORGANS: Calcification of the arcuate arteries within the uterus is seen. There is no uterine enlargement or adnexal mass.   BOWEL: A rectal balloon is seen. There is no abnormal distention of the intestinal tract.   VESSELS: There is atherosclerosis of the thoracoabdominal aorta and iliac arteries with calcification in the walls of the celiac, splenic, hepatic, and mesenteric arteries.   PERITONEUM/RETROPERITONEUM/LYMPH NODES: There is a minimal amount of ascites seen about the liver and spleen with mild presacral edema noted.   ABDOMINAL WALL: There is anasarca involving the soft tissues of the abdomen and pelvis. Postoperative change from ventral hernia repair is seen.   BONES: Bilateral sacroiliitis is seen. There is lumbar dextroscoliosis. Chronic rotator cuff tear is present bilaterally with osteoarthritis of both shoulders, right greater than left.       Chest 1.  Moderately small bilateral pleural effusions with left upper lobe atelectasis and compressive atelectasis seen posteriorly in right lower lobe. A left lower lobe pneumonia is identified. There is extensive airspace consolidation within left lower lobe with air bronchograms observed. Mild mediastinal reactive adenopathy is present as well.    Abdomen-Pelvis 1.  Cholelithiasis without evidence of cholecystitis. 2. Small amount of ascites with mild presacral edema and anasarca within the soft tissues of the abdominal wall. 3. Renal atrophy with extensive vascular calcifications.     MACRO: None   Signed by: Maria Garcia 1/21/2024 8:30 AM Dictation workstation:   [id number]    CT head wo IV contrast    Result Date: 1/21/2024  Interpreted By:  Maria Garcia, STUDY: CT HEAD WO IV CONTRAST 1/20/2024 11:42 pm   INDICATION: Signs/Symptoms:mental status changes   COMPARISON: 12/11/2023   ACCESSION NUMBER(S): [id number]   ORDERING CLINICIAN: BAN GIFFORD   TECHNIQUE: Unenhanced axial images of the brain are completed.   All CT examinations are performed with 1 or more of the following dose reduction techniques: Automated exposure control, adjustment of mA and/or kv according to patient's size, or use of iterative reconstruction techniques.   FINDINGS: Helical unenhanced axial images of the brain demonstrate a mild to moderate degree of ventricular enlargement with proportionate widening of the sulci and sylvian fissures. There is no midline shift, mass effect, extra-axial fluid collection, or acute intracranial hemorrhage. There is diminished density seen in the periventricular white matter indicating chronic microvascular ischemic disease. There is calcified plaque seen within the distal vertebral and internal carotid arteries bilaterally. No calvarial abnormality is seen.       Atrophy and chronic microvascular ischemic disease without acute intracranial process.   Signed by: Maria Garcia 1/21/2024 8:09 AM Dictation workstation:   [id number]    XR chest 1 view    Result Date: 1/20/2024  Interpreted By:  Brendon Perez, STUDY: XR CHEST 1 VIEW; 1/20/2024 5:03 pm   INDICATION: CLINICAL INFORMATION: Signs/Symptoms:Insertion right IJ central line, also left thoracentesis.   COMPARISON: 01/19/2024 at 1338 hours   ACCESSION NUMBER(S): [id number]   ORDERING CLINICIAN:  BOZENA ANTONIO   TECHNIQUE: Portable chest one view.   FINDINGS: The cardiac size is indeterminate in view of the AP projection. There is no change in the right-sided dual port central venous catheter. There is a new right internal jugular venous catheter present with the tip at approximately same level as the dual port central venous catheter, overlying the mid to lower right atrium. There is no evidence for pneumothorax. Patient has a history of left thoracentesis without evidence for pneumothorax on the left. There is bilateral basilar alveolar infiltrate and effusions. Left effusion is decreased compared to the study from 1 day earlier.   Surgical clips are identified within left chest wall. Endovascular stent is identified in the distribution of the left subclavian artery.       1. Status post right-sided central venous catheter placement as described above with the tip overlying the mid to caudal aspect of the right atrium. There is no evidence for pneumothorax. 2. No evidence for left-sided pneumothorax after left-sided thoracentesis. Decrease in the left effusion. 3. Bilateral basilar infiltrates and effusions are present. Follow-up to assure complete clearing is suggested.   MACRO: none   Signed by: Brendon Perez 1/20/2024 5:11 PM Dictation workstation:   [id number]    XR chest 1 view    Result Date: 1/19/2024  Interpreted By:  Real Mendieta, STUDY: XR CHEST 1 VIEW; 1/19/2024 1:38 pm   INDICATION: Signs/Symptoms:dyspnea.   COMPARISON: 12/26/2023   ACCESSION NUMBER(S): [id number]   ORDERING CLINICIAN: EMELY GOLDSMITH   TECHNIQUE: 1 view of the chest was performed.   FINDINGS: There may be a minimal right pleural effusion. Slight prominence of the interstitium otherwise the right lung is clear. Improved appearance of the left lung with improved aeration of the right upper lobe. There is opacification of the left lower lobe possibly due to large pleural effusion which is similar to the prior study. No  pneumothorax. Right-sided dual lumen central line catheter with tip at the proximal atrium. The cardiomediastinal silhouette is within normal limits. Left-sided subclavian stents are again noted.       Improved aeration and appearance of the left lung superiorly however there is a persistent large left pleural effusion and opacification of the left lower lobe.   Signed by: Real Mendieta 1/19/2024 1:44 PM Dictation workstation:   [id number]     Assessment/Plan   Septic shock-off pressors  Left-sided pleural effusion   Left lower lobe pneumonia-treated  Proteus urinary tract infection-treated  History of MRSA endocarditis  History of C. difficile infection  Infected bilateral heel ulcers-wound culture growing Pseudomonas, Proteus, Staph aureus  Wtajwmadigop-tbgdjfeaiveosd-fgyrogud  Type 2 diabetes with peripheral neuropathy with gangrene-Matson 3 versus 4  Severe malnutrition-prealbumin less than 3           Monitor off pressors  Discontinue meropenem  IV cefepime-coverage for Pseudomonas and Proteus  IV vancomycin-coverage for Staph aureus pending susceptibility  Follow-up wound cultures-final report  Continue oral doxycycline-suppressive therapy  Continue oral vancomycin-patient at risk for relapse  Contact plus precautions-at risk for relapse   final recommendations based on culture results  Supportive care  Monitor temperature and WBC  Local care  Offloading      Stephen Coulter MD

## 2024-02-13 NOTE — PROGRESS NOTES
CONSULT PROGRESS NOTES    SERVICE DATE: 2/13/2024   SERVICE TIME: 10:36 AM    CONSULTING SERVICE: Nephrology    ASSESSMENT AND PLAN   1.  End-stage renal disease  2.  Hypotension  3.  Hyponatremia  4.  Hypokalemia  5.  Anemia of chronic kidney disease     She underwent dialysis yesterday with Tablo using low temperature and minimal fluid removal.  Tolerated roughly 1.5 L volume removal.  Now off of pressors since after dialysis yesterday.  Hyponatremia from volume overload in this patient with anuria.  Hypokalemia improved, now using 3K bath.  I would not replace the potassium.  I do not believe she benefits from daily chemistry panels.  Hemoglobin is low, use Retacrit 10,000 units IV 3 times a week with dialysis.  None of her options for disposition are great.  She has a poor overall prognosis, but it has been difficult to get the patient and her daughter/family to understand this.  I have had numerous discussions with the patient about where to proceed from here, but her main goals continue to be to see her grandchildren as long as possible while she is still is without significant pain.  She remains full code, despite numerous attempts from ICU team, palliative care, and myself to redefine and be more realistic with her goals of care.    SUBJECTIVE  INTERVAL HPI: She feels okay.  She knows it is Tuesday, knows that she is due for dialysis yesterday.  Off of norepinephrine infusion.  Antibiotics were broadened yesterday.    MEDICATIONS:  apixaban, 2.5 mg, oral, BID  doxycylcine, 100 mg, oral, Daily  epoetin di or biosimilar, 10,000 Units, intravenous, Every Mon/Wed/Fri  escitalopram, 10 mg, oral, Nightly  fludrocortisone, 0.1 mg, oral, TID  gabapentin, 100 mg, oral, TID  heparin, 2,000 Units, intra-catheter, After Dialysis  heparin, 2,000 Units, intra-catheter, After Dialysis  heparin, 2,000 Units, intra-catheter, After Dialysis  heparin, 2,000 Units, intra-catheter, After Dialysis  insulin lispro, 0-10 Units,  subcutaneous, TID with meals  ipratropium-albuteroL, 3 mL, nebulization, q6h while awake  lidocaine, 5 mL, infiltration, Once  magnesium sulfate, 2 g, intravenous, Once  meropenem, 500 mg, intravenous, q12h  midodrine, 15 mg, oral, TID with meals  nystatin, , Topical, BID  nystatin, , Topical, BID  pantoprazole, 40 mg, oral, Daily   Or  pantoprazole, 40 mg, intravenous, Daily  perflutren lipid microspheres, 0.5-10 mL of dilution, intravenous, Once in imaging  perflutren protein A microsphere, 0.5 mL, intravenous, Once in imaging  psyllium, 1 packet, oral, BID  sodium chloride, 3 mL, nebulization, TID  sulfur hexafluoride microsphr, 2 mL, intravenous, Once in imaging  sulfur hexafluoride microsphr, 2 mL, intravenous, Once in imaging  vancomycin, 125 mg, oral, BID  zinc oxide, 1 Application, Topical, BID       norepinephrine, 0.01-3 mcg/kg/min, Last Rate: Stopped (02/12/24 1700)       PRN medications: acetaminophen, alteplase, ammonium lactate, benzocaine-menthoL, dextrose 10 % in water (D10W), dextrose, glucagon, ondansetron, oxygen, sennosides-docusate sodium     OBJECTIVE  PHYSICAL EXAM:   Heart Rate:  []   Temp:  [36.2 °C (97.2 °F)-37 °C (98.6 °F)]   Resp:  [15-27]   BP: (50-97)/(25-75)   SpO2:  [86 %-100 %]   Body mass index is 36.13 kg/m².  Chronically ill-appearing elderly white woman  Pale skin  Right-sided hand swelling  Left-sided finger amputation  Internal jugular tunneled hemodialysis catheter in place  There is no significant pretibial edema on both lower extremities  Soft abdomen  No Amos  No obvious joint deformities  Moist mucosa  Hearing seems to be intact  Phonation intact  Rectal tube in place       DATA:   Labs:  Results for orders placed or performed during the hospital encounter of 01/19/24 (from the past 96 hour(s))   POCT GLUCOSE   Result Value Ref Range    POCT Glucose 174 (H) 74 - 99 mg/dL   POCT GLUCOSE   Result Value Ref Range    POCT Glucose 156 (H) 74 - 99 mg/dL   POCT GLUCOSE    Result Value Ref Range    POCT Glucose 217 (H) 74 - 99 mg/dL   CBC   Result Value Ref Range    WBC 10.5 4.4 - 11.3 x10*3/uL    nRBC 0.0 0.0 - 0.0 /100 WBCs    RBC 2.56 (L) 4.00 - 5.20 x10*6/uL    Hemoglobin 8.0 (L) 12.0 - 16.0 g/dL    Hematocrit 25.5 (L) 36.0 - 46.0 %     80 - 100 fL    MCH 31.3 26.0 - 34.0 pg    MCHC 31.4 (L) 32.0 - 36.0 g/dL    RDW 23.1 (H) 11.5 - 14.5 %    Platelets 199 150 - 450 x10*3/uL   Basic metabolic panel   Result Value Ref Range    Glucose 230 (H) 65 - 99 mg/dL    Sodium 132 (L) 133 - 145 mmol/L    Potassium 3.4 3.4 - 5.1 mmol/L    Chloride 98 97 - 107 mmol/L    Bicarbonate 22 (L) 24 - 31 mmol/L    Urea Nitrogen 12 8 - 25 mg/dL    Creatinine 2.00 (H) 0.40 - 1.60 mg/dL    eGFR 26 (L) >60 mL/min/1.73m*2    Calcium 8.6 8.5 - 10.4 mg/dL    Anion Gap 12 <=19 mmol/L   POCT GLUCOSE   Result Value Ref Range    POCT Glucose 191 (H) 74 - 99 mg/dL   POCT GLUCOSE   Result Value Ref Range    POCT Glucose 178 (H) 74 - 99 mg/dL   Tissue/Wound Culture/Smear    Specimen: Wound/Tissue; Tissue/Biopsy   Result Value Ref Range    Tissue/Wound Culture/Smear (4+) Abundant Pseudomonas aeruginosa (A)     Tissue/Wound Culture/Smear (3+) Moderate Klebsiella pneumoniae/variicola (A)     Gram Stain (2+) Few Polymorphonuclear leukocytes (A)     Gram Stain (2+) Few Gram negative bacilli (A)        Susceptibility    Pseudomonas aeruginosa - MICROSCAN     Aztreonam  Susceptible      Cefepime  Susceptible      Ceftazidime  Susceptible      Ciprofloxacin  Susceptible      Levofloxacin  Susceptible      Piperacillin/Tazobactam  Susceptible      Tobramycin  Susceptible    Tissue/Wound Culture/Smear    Specimen: Other (specify in comments); Tissue/Biopsy   Result Value Ref Range    Tissue/Wound Culture/Smear (3+) Moderate Pseudomonas aeruginosa (A)     Tissue/Wound Culture/Smear (4+) Abundant Proteus mirabilis (A)     Gram Stain (4+) Abundant Polymorphonuclear leukocytes (A)     Gram Stain (3+) Moderate Gram negative  bacilli (A)    POCT GLUCOSE   Result Value Ref Range    POCT Glucose 197 (H) 74 - 99 mg/dL   POCT GLUCOSE   Result Value Ref Range    POCT Glucose 180 (H) 74 - 99 mg/dL   CBC   Result Value Ref Range    WBC 11.0 4.4 - 11.3 x10*3/uL    nRBC 0.0 0.0 - 0.0 /100 WBCs    RBC 2.52 (L) 4.00 - 5.20 x10*6/uL    Hemoglobin 7.9 (L) 12.0 - 16.0 g/dL    Hematocrit 25.4 (L) 36.0 - 46.0 %     (H) 80 - 100 fL    MCH 31.3 26.0 - 34.0 pg    MCHC 31.1 (L) 32.0 - 36.0 g/dL    RDW 22.8 (H) 11.5 - 14.5 %    Platelets 228 150 - 450 x10*3/uL   Basic metabolic panel   Result Value Ref Range    Glucose 190 (H) 65 - 99 mg/dL    Sodium 131 (L) 133 - 145 mmol/L    Potassium 3.3 (L) 3.4 - 5.1 mmol/L    Chloride 99 97 - 107 mmol/L    Bicarbonate 20 (L) 24 - 31 mmol/L    Urea Nitrogen 16 8 - 25 mg/dL    Creatinine 2.70 (H) 0.40 - 1.60 mg/dL    eGFR 18 (L) >60 mL/min/1.73m*2    Calcium 8.6 8.5 - 10.4 mg/dL    Anion Gap 12 <=19 mmol/L   POCT GLUCOSE   Result Value Ref Range    POCT Glucose 165 (H) 74 - 99 mg/dL   POCT GLUCOSE   Result Value Ref Range    POCT Glucose 158 (H) 74 - 99 mg/dL   POCT GLUCOSE   Result Value Ref Range    POCT Glucose 118 (H) 74 - 99 mg/dL   POCT GLUCOSE   Result Value Ref Range    POCT Glucose 190 (H) 74 - 99 mg/dL   Basic Metabolic Panel   Result Value Ref Range    Glucose 166 (H) 65 - 99 mg/dL    Sodium 136 133 - 145 mmol/L    Potassium 3.4 3.4 - 5.1 mmol/L    Chloride 102 97 - 107 mmol/L    Bicarbonate 24 24 - 31 mmol/L    Urea Nitrogen 9 8 - 25 mg/dL    Creatinine 1.60 0.40 - 1.60 mg/dL    eGFR 34 (L) >60 mL/min/1.73m*2    Calcium 8.4 (L) 8.5 - 10.4 mg/dL    Anion Gap 10 <=19 mmol/L   CBC   Result Value Ref Range    WBC 10.3 4.4 - 11.3 x10*3/uL    nRBC 0.0 0.0 - 0.0 /100 WBCs    RBC 2.64 (L) 4.00 - 5.20 x10*6/uL    Hemoglobin 8.2 (L) 12.0 - 16.0 g/dL    Hematocrit 26.5 (L) 36.0 - 46.0 %     80 - 100 fL    MCH 31.1 26.0 - 34.0 pg    MCHC 30.9 (L) 32.0 - 36.0 g/dL    RDW 22.8 (H) 11.5 - 14.5 %    Platelets  173 150 - 450 x10*3/uL   POCT GLUCOSE   Result Value Ref Range    POCT Glucose 187 (H) 74 - 99 mg/dL   POCT GLUCOSE   Result Value Ref Range    POCT Glucose 190 (H) 74 - 99 mg/dL   POCT GLUCOSE   Result Value Ref Range    POCT Glucose 133 (H) 74 - 99 mg/dL   POCT GLUCOSE   Result Value Ref Range    POCT Glucose 170 (H) 74 - 99 mg/dL   Hepatitis B core antibody, IgM   Result Value Ref Range    Hepatitis B Core AB; IgM Nonreactive Nonreactive   Hepatitis B core antibody, total   Result Value Ref Range    Hepatitis B Core AB- Total Nonreactive Nonreactive   Hepatitis B surface antibody   Result Value Ref Range    Hepatitis B Surface AB 4.4 <10.0 mIU/mL   Hepatitis B surface antigen   Result Value Ref Range    Hepatitis B Surface AG Nonreactive Nonreactive   CBC and Auto Differential   Result Value Ref Range    WBC 10.7 4.4 - 11.3 x10*3/uL    nRBC 0.0 0.0 - 0.0 /100 WBCs    RBC 2.50 (L) 4.00 - 5.20 x10*6/uL    Hemoglobin 7.8 (L) 12.0 - 16.0 g/dL    Hematocrit 25.3 (L) 36.0 - 46.0 %     (H) 80 - 100 fL    MCH 31.2 26.0 - 34.0 pg    MCHC 30.8 (L) 32.0 - 36.0 g/dL    RDW 22.3 (H) 11.5 - 14.5 %    Platelets 191 150 - 450 x10*3/uL    Neutrophils % 60.6 40.0 - 80.0 %    Immature Granulocytes %, Automated 0.8 0.0 - 0.9 %    Lymphocytes % 21.9 13.0 - 44.0 %    Monocytes % 11.2 2.0 - 10.0 %    Eosinophils % 4.7 0.0 - 6.0 %    Basophils % 0.8 0.0 - 2.0 %    Neutrophils Absolute 6.47 (H) 1.60 - 5.50 x10*3/uL    Immature Granulocytes Absolute, Automated 0.09 0.00 - 0.50 x10*3/uL    Lymphocytes Absolute 2.33 0.80 - 3.00 x10*3/uL    Monocytes Absolute 1.19 (H) 0.05 - 0.80 x10*3/uL    Eosinophils Absolute 0.50 (H) 0.00 - 0.40 x10*3/uL    Basophils Absolute 0.08 0.00 - 0.10 x10*3/uL   Comprehensive Metabolic Panel   Result Value Ref Range    Glucose 183 (H) 65 - 99 mg/dL    Sodium 130 (L) 133 - 145 mmol/L    Potassium 3.7 3.4 - 5.1 mmol/L    Chloride 100 97 - 107 mmol/L    Bicarbonate 20 (L) 24 - 31 mmol/L    Urea Nitrogen 17 8  - 25 mg/dL    Creatinine 2.40 (H) 0.40 - 1.60 mg/dL    eGFR 21 (L) >60 mL/min/1.73m*2    Calcium 8.3 (L) 8.5 - 10.4 mg/dL    Albumin 2.6 (L) 3.5 - 5.0 g/dL    Alkaline Phosphatase 140 (H) 35 - 125 U/L    Total Protein 5.6 (L) 5.9 - 7.9 g/dL    AST 14 5 - 40 U/L    Bilirubin, Total 0.6 0.1 - 1.2 mg/dL    ALT 6 5 - 40 U/L    Anion Gap 10 <=19 mmol/L   Magnesium   Result Value Ref Range    Magnesium 1.50 (L) 1.60 - 3.10 mg/dL   Phosphorus   Result Value Ref Range    Phosphorus 2.5 2.5 - 4.5 mg/dL   Morphology   Result Value Ref Range    RBC Morphology No significant RBC morphology present    POCT GLUCOSE   Result Value Ref Range    POCT Glucose 158 (H) 74 - 99 mg/dL         SIGNATURE: Saeed Mondragon MD PATIENT NAME: Ayanna Gross   DATE: February 13, 2024 MRN: [id number]   TIME: 10:36 AM PAGER: [phone]

## 2024-02-13 NOTE — CARE PLAN
Problem: Pain  Goal: My pain/discomfort is manageable  Outcome: Progressing     Problem: Safety  Goal: Patient will be injury free during hospitalization  Outcome: Progressing  Goal: I will remain free of falls  Outcome: Progressing     Problem: Daily Care  Goal: Daily care needs are met  Outcome: Progressing     Problem: Psychosocial Needs  Goal: Demonstrates ability to cope with hospitalization/illness  Outcome: Progressing  Flowsheets (Taken 2/1/2024 1837 by Peri Martin RN)  Demonstrates ability to cope with hospitalization/illness:   Encourage verbalization of feelings/concerns/expectations   Provide low-stimulation environment as needed   Assist resident to identify and practice own strengths and abilities   Encourage resident to set and complete small goals for self   Encourage participation in diversional activities   Reinforce positive adaptation of new coping behaviors   Include resident/family/caregiver in decisions related to psychosocial needs     Problem: Fall/Injury  Goal: Not fall by end of shift  Outcome: Progressing  Goal: Be free from injury by end of the shift  Outcome: Progressing  Goal: Verbalize understanding of personal risk factors for fall in the hospital  Outcome: Progressing  Goal: Verbalize understanding of risk factor reduction measures to prevent injury from fall in the home  Outcome: Progressing     Problem: Skin  Goal: Decreased wound size/increased tissue granulation at next dressing change  Outcome: Progressing  Flowsheets (Taken 2/12/2024 2242)  Decreased wound size/increased tissue granulation at next dressing change:   Promote sleep for wound healing   Utilize specialty bed per algorithm   Protective dressings over bony prominences  Goal: Participates in plan/prevention/treatment measures  Outcome: Progressing  Flowsheets (Taken 2/12/2024 2242)  Participates in plan/prevention/treatment measures:   Discuss with provider PT/OT consult   Elevate heels   Increase  activity/out of bed for meals  Goal: Prevent/manage excess moisture  Outcome: Progressing  Flowsheets (Taken 2/12/2024 2242)  Prevent/manage excess moisture:   Cleanse incontinence/protect with barrier cream   Moisturize dry skin   Monitor for/manage infection if present   Follow provider orders for dressing changes  Goal: Prevent/minimize sheer/friction injuries  Outcome: Progressing  Flowsheets (Taken 2/12/2024 2242)  Prevent/minimize sheer/friction injuries:   Complete micro-shifts as needed if patient unable. Adjust patient position to relieve pressure points, not a full turn   Increase activity/out of bed for meals   Use pull sheet   HOB 30 degrees or less   Utilize specialty bed per algorithm   Turn/reposition every 2 hours/use positioning/transfer devices  Goal: Promote/optimize nutrition  Outcome: Progressing  Flowsheets (Taken 2/12/2024 2242)  Promote/optimize nutrition:   Assist with feeding   Monitor/record intake including meals   Offer water/supplements/favorite foods   Discuss with provider if NPO > 2 days   Reassess MST if dietician not consulted  Goal: Promote skin healing  Outcome: Progressing  Flowsheets (Taken 2/12/2024 2242)  Promote skin healing:   Assess skin/pad under line(s)/device(s)   Protective dressings over bony prominences   Turn/reposition every 2 hours/use positioning/transfer devices   Rotate device position/do not position patient on device   Ensure correct size (line/device) and apply per  instructions   The patient's goals for the shift include Visit with family.    The clinical goals for the shift include Pt will tolerate lack of IV pressor support throughout shift

## 2024-02-13 NOTE — PROGRESS NOTES
"Nutrition Follow up Note    Nutrition Assessment      Patient to continue w/treatment model of care. Insurance declined appeal for SNF. Patient discharge disposition not yet determined. MD notes adequate po intake.    Nutrition History:     Energy Intake: Fair 50-75 %  Food Allergies/Intolerances:  None  GI Symptoms: None  Oral Problems: None    Anthropometrics:  Ht: 157.5 cm (5' 2\"), Wt: 89.6 kg (197 lb 8.5 oz), BMI: 36.12  IBW/kg (Dietitian Calculated): 50 kg  Percent of IBW: 151.6 %  Adjusted Body Weight (kg): 56.36 kg    Weight Change:  Daily Weight  02/12/24 : 89.6 kg (197 lb 8.5 oz)  01/05/24 : 72.8 kg (160 lb 7.9 oz)  11/19/23 : 72.6 kg (160 lb)  01/19/23 : 80.2 kg (176 lb 12.9 oz)  11/07/22 : 86.2 kg (190 lb)  07/21/22 : 80.2 kg (176 lb 12.9 oz)  05/26/22 : 77.1 kg (170 lb)  04/04/22 : 86.2 kg (190 lb)  06/29/21 : 87.9 kg (193 lb 12.6 oz)  05/11/21 : 81.6 kg (180 lb)     Nutrition Focused Physical Exam Findings:        Edema  Edema: +2 mild  Edema Location: BLE, BUE    Nutrition Significant Labs:  Lab Results   Component Value Date    WBC 10.7 02/13/2024    HGB 7.8 (L) 02/13/2024    HCT 25.3 (L) 02/13/2024     02/13/2024    CHOL 104 (L) 07/01/2023    TRIG 155 (H) 07/01/2023    HDL 30 (L) 07/01/2023    ALT 6 02/13/2024    AST 14 02/13/2024     (L) 02/13/2024    K 3.7 02/13/2024     02/13/2024    CREATININE 2.40 (H) 02/13/2024    BUN 17 02/13/2024    CO2 20 (L) 02/13/2024    TSH 6.10 (H) 01/24/2024    INR 1.3 (H) 01/19/2024    HGBA1C 8.8 (H) 06/25/2023    ALBUR 1,315 (H) 02/24/2019       Current Facility-Administered Medications:     acetaminophen (Tylenol) tablet 650 mg, 650 mg, oral, q4h PRN, MADHURI Mccray-CNP, 650 mg at 02/12/24 2056    alteplase (Cathflo Activase) injection 2 mg, 2 mg, intra-catheter, PRN, MADHURI Mccray-CNP    ammonium lactate (Lac-Hydrin) 12 % lotion 1 Application, 1 Application, Topical, q1h PRN, Minh Shane MD, 1 Application at 02/11/24 1102   "  apixaban (Eliquis) tablet 2.5 mg, 2.5 mg, oral, BID, Judd Tavera DO, 2.5 mg at 02/13/24 0831    benzocaine-menthoL (Dermoplast) topical spray, , Topical, 4x daily PRN, MADHURI Diaz-CNP    dextrose 10 % in water (D10W) infusion, 0.3 g/kg/hr, intravenous, Once PRN, Judd Tavera DO    dextrose 50 % injection 25 g, 25 g, intravenous, q15 min PRN, Judd Tavera DO    doxycycline (Vibramycin) capsule 100 mg, 100 mg, oral, Daily, Judd Barreto MD, 100 mg at 02/12/24 1138    epoetin di-epbx (Retacrit) injection 10,000 Units, 10,000 Units, intravenous, Every Mon/Wed/Fri, Saeed Mondragon MD, 10,000 Units at 02/12/24 0953    escitalopram (Lexapro) tablet 10 mg, 10 mg, oral, Nightly, Judd Tavera DO, 10 mg at 02/12/24 2056    fludrocortisone (Florinef) tablet 0.1 mg, 0.1 mg, oral, TID, Yusuf De La Garza PA-C, 0.1 mg at 02/13/24 0831    gabapentin (Neurontin) capsule 100 mg, 100 mg, oral, TID, MADHURI Diaz-CNP, 100 mg at 02/13/24 0831    glucagon (Glucagen) injection 1 mg, 1 mg, intramuscular, q15 min PRN, Judd Tavera DO    heparin 1,000 unit/mL injection 2,000 Units, 2,000 Units, intra-catheter, After Dialysis, aSeed Mondragon MD, 1,600 Units at 02/07/24 2100    heparin 1,000 unit/mL injection 2,000 Units, 2,000 Units, intra-catheter, After Dialysis, Saeed Mondragon MD, 1,600 Units at 02/07/24 2120    heparin 1,000 unit/mL injection 2,000 Units, 2,000 Units, intra-catheter, After Dialysis, Saeed Mondragon MD, 1,600 Units at 02/05/24 1845    heparin 1,000 unit/mL injection 2,000 Units, 2,000 Units, intra-catheter, After Dialysis, Saeed Mondragon MD, 1,600 Units at 02/12/24 0610    insulin lispro (HumaLOG) injection 0-10 Units, 0-10 Units, subcutaneous, TID with meals, Judd Tavera DO, 2 Units at 02/13/24 0832    ipratropium-albuteroL (Duo-Neb) 0.5-2.5 mg/3 mL nebulizer solution 3 mL, 3 mL, nebulization, q6h while awake, Judd MAC  MD Mary Lou, 3 mL at 02/13/24 0759    lidocaine (Xylocaine) 10 mg/mL (1 %) injection 50 mg, 5 mL, infiltration, Once, LILLIE Mccray    magnesium sulfate IV 2 g, 2 g, intravenous, Once, Vinod Livingston PA-C    meropenem (Merrem) 500 mg in sodium chloride 0.9 % 100 mL IV, 500 mg, intravenous, q12h, Stephen Coulter MD, Stopped at 02/13/24 0939    midodrine (Proamatine) tablet 15 mg, 15 mg, oral, TID with meals, Judd Tavera DO, 15 mg at 02/13/24 0831    norepinephrine (Levophed) 8 mg in dextrose 5% 250 mL (0.032 mg/mL) infusion (premix), 0.01-3 mcg/kg/min, intravenous, Continuous, Yusuf De La Garza PA-C, Stopped at 02/12/24 1700    nystatin (Mycostatin) 100,000 unit/gram powder, , Topical, BID, Judd Tavera DO, Given at 02/13/24 0837    nystatin (Mycostatin) cream, , Topical, BID, LILLIE Blair, Given at 02/13/24 0837    ondansetron (Zofran) injection 4 mg, 4 mg, intravenous, q6h PRN, LILLIE Mccray, 4 mg at 02/03/24 0521    oxygen (O2) therapy, , inhalation, Continuous PRN - O2/gases, Judd Tavera DO, 3 L/min at 02/12/24 1600    pantoprazole (ProtoNix) EC tablet 40 mg, 40 mg, oral, Daily, 40 mg at 02/13/24 0831 **OR** pantoprazole (ProtoNix) injection 40 mg, 40 mg, intravenous, Daily, Yusuf De La Garza PA-C, 40 mg at 02/07/24 0859    perflutren lipid microspheres (Definity) injection 0.5-10 mL of dilution, 0.5-10 mL of dilution, intravenous, Once in imaging, Minh Shane MD    perflutren protein A microsphere (Optison) injection 0.5 mL, 0.5 mL, intravenous, Once in imaging, Minh Shane MD    psyllium (Metamucil) 3.4 gram packet 1 packet, 1 packet, oral, BID, MADHURI Mccray-CNP, 1 packet at 02/08/24 0825    sennosides-docusate sodium (Judy-Colace) 8.6-50 mg per tablet 1 tablet, 1 tablet, oral, Nightly PRN, Yusuf De La Garza PA-C    sodium chloride 3 % nebulizer solution 3 mL, 3 mL, nebulization, TID, Charly Grossman MD, 3 mL at 02/13/24 6301     sulfur hexafluoride microsphr (Lumason) injection 24.28 mg, 2 mL, intravenous, Once in imaging, Minh Shane MD    sulfur hexafluoride microsphr (Lumason) injection 24.28 mg, 2 mL, intravenous, Once in imaging, Ángel Hardy, APRN-CNP    vancomycin (Vancocin) capsule 125 mg, 125 mg, oral, BID, Iraida Lehman, APRN-CNP, 125 mg at 02/13/24 0828    zinc oxide 20 % ointment 1 Application, 1 Application, Topical, BID, Nickolas QUIROZ Lashonda, APRN-CNP, 1 Application at 02/13/24 0837    Dietary Orders (From admission, onward)       Start     Ordered    02/07/24 1054  Adult diet Regular  Diet effective now        Question:  Diet type  Answer:  Regular    02/07/24 1053                   Estimated Needs:   Estimated Energy Needs  Total Energy Estimated Needs (kCal):  (3883-1486)  Total Estimated Energy Need per Day (kCal/kg):  (30-35)  Method for Estimating Needs: ABW    Estimated Protein Needs  Total Protein Estimated Needs (g):  (68-85)  Total Protein Estimated Needs (g/kg):  (1.2-1.5)  Method for Estimating Needs: ABW    Estimated Fluid Needs  Total Fluid Estimated Needs (mL):  (UO + 500 mL)  Method for Estimating Needs: HD and multiple wounds      Nutrition Diagnosis   Nutrition Diagnosis:  Malnutrition Diagnosis  Diagnosis Status: Ongoing    Nutrition Diagnosis  Patient has Nutrition Diagnosis: Yes  Diagnosis Status (1): Ongoing  Nutrition Diagnosis 1: Increased nutrient needs  Related to (1): Increased demand for nutrient  As Evidenced by (1): HD     Nutrition Interventions/Recommendations   Nutrition Interventions and Recommendations:    Nutrition Prescription:  Individualized Nutrition Prescription Provided for : 4290-0300 kcals, 68-85 gm protein via Renal diet    Nutrition Interventions:   Food and/or Nutrient Delivery Interventions  Interventions: Meals and snacks  Meals and Snacks: General healthful diet  Goal: Provide as ordered    Education Documentation  No documentation found.         Nutrition  Monitoring and Evaluation   Monitoring/Evaluation:   Food/Nutrient Related History Monitoring  Monitoring and Evaluation Plan: Energy intake  Energy Intake: Estimated energy intake  Criteria: Pt to consume >/= 75% of estimated needs       Time Spent/Follow-up:   Follow Up  Time Spent (min): 20 minutes  Last Date of Nutrition Visit: 02/13/24  Nutrition Follow-Up Needed?: 5-7 days  Follow up Comment: 2/19/24

## 2024-02-14 NOTE — PROGRESS NOTES
"Vancomycin Dosing by Pharmacy- INITIAL    Ayanna Gross is a 73 y.o. year old female who Pharmacy has been consulted for vancomycin dosing for cellulitis, skin and soft tissue. Based on the patient's indication and renal status this patient will be dosed based on a goal AUC of 400-600.     Renal function is currently declining.    Visit Vitals  BP (!) 52/31   Pulse 98   Temp 36.9 °C (98.4 °F) (Temporal)   Resp 18        Lab Results   Component Value Date    CREATININE 2.40 (H) 02/13/2024    CREATININE 1.60 02/12/2024    CREATININE 2.70 (H) 02/11/2024    CREATININE 2.00 (H) 02/10/2024        Patient weight is No results found for: \"PTWEIGHT\"    No results found for: \"CULTURE\"     I/O last 3 completed shifts:  In: 853.4 (9.2 mL/kg) [P.O.:720; I.V.:33.4 (0.4 mL/kg); IV Piggyback:100]  Out: 550 (5.9 mL/kg) [Stool:550]  Dosing Weight: 93 kg   [unfilled]    Lab Results   Component Value Date    PATIENTTEMP 37.0 01/20/2024    PATIENTTEMP 37.0 12/14/2023    PATIENTTEMP 37.0 12/11/2023          Assessment/Plan     Patient will not be given a loading dose.  Will initiate vancomycin maintenance,  750 mg every 24 hours.    This dosing regimen is predicted by [employer] to result in the following pharmacokinetic parameters:    Loading dose: N/A  Regimen: 750 mg IV every 24 hours.  Start time: 19:44 on 02/13/2024  Exposure target: AUC24 (range)400-600 mg/L.hr   AUC24,ss: 463 mg/L.hr  Probability of AUC24 > 400: 67 %  Ctrough,ss: 16.1 mg/L  Probability of Ctrough,ss > 20: 26 %  Probability of nephrotoxicity (Lodise REY 2009): 11 %    Follow-up level will be ordered on 2/14 at 0500 unless clinically indicated sooner.  Will continue to monitor renal function daily while on vancomycin and order serum creatinine at least every 48 hours if not already ordered.  Follow for continued vancomycin needs, clinical response, and signs/symptoms of toxicity.       Felecia Vaz, PharmD       "

## 2024-02-14 NOTE — NURSING NOTE
Pt has a R chest dialysis catheter, drsg dry and intact (dated 2/13) without any redness, swelling or drainage, pt also has a dual lumen picc line to R upper arm, drsg dry and intact (dated 2/12) without any redness, swelling or drainage, the red lumen has brisk blood return and flushes easily with NS, curos cap applied, the other lumen currently in use and infusing without any difficulty.

## 2024-02-14 NOTE — PROGRESS NOTES
Ayanna Gross is a 73 y.o. female on day 26 of admission presenting with Septic shock (CMS/HCC).    Subjective   Interval History:   Afebrile  Awake, alert  Off pressors    Review of Systems   All other systems reviewed and are negative.      Objective   Range of Vitals (last 24 hours)  Heart Rate:  []   Temp:  [36.1 °C (97 °F)-36.9 °C (98.4 °F)]   Resp:  [16-23]   BP: ()/()   SpO2:  [90 %-96 %]   Daily Weight  02/12/24 : 89.6 kg (197 lb 8.5 oz)    Body mass index is 36.13 kg/m².    Physical Exam  Constitutional:       Appearance: Awake, alert  HENT:      Head: Normocephalic and atraumatic.      Nose: Nose normal.   Eyes:      Extraocular Movements: Extraocular movements intact.      Conjunctiva/sclera: Conjunctivae normal.   Cardiovascular:      Heart sounds: Normal heart sounds, S1 normal and S2 normal.   Pulmonary:      Breath sounds: Decreased breath sounds present.   Abdominal:      General: Bowel sounds are normal.      Palpations: Abdomen is soft.   Musculoskeletal:      Cervical back: Normal range of motion and neck supple.   Skin:     Comments: Stage III sacral ulcer, bilateral posterior heel eschar -photos examined  Neurological:      Mental Status: She is awake, alert    Antibiotics  aspirin tablet 325 mg  acetaminophen (Tylenol) tablet 650 mg  cefepime (Maxipime) 1 g in dextrose 5 % 50 mL IV  sodium chloride 0.9 % bolus 2,229 mL  apixaban (Eliquis) tablet 2.5 mg  escitalopram (Lexapro) tablet 10 mg  febuxostat (Uloric) tablet 40 mg  fenofibrate (Triglide) tablet 160 mg  gabapentin (Neurontin) capsule 100 mg  midodrine (Proamatine) tablet 15 mg  nystatin (Mycostatin) 100,000 unit/gram powder  oxyCODONE-acetaminophen (Percocet) 5-325 mg per tablet 1 tablet  simvastatin (Zocor) tablet 20 mg  piperacillin-tazobactam-dextrose (Zosyn) IV 2.25 g  vancomycin (Vancocin) capsule 125 mg  oxygen (O2) therapy  dextrose 50 % injection 25 g  glucagon (Glucagen) injection 1 mg  dextrose 10 % in water  (D10W) infusion  insulin lispro (HumaLOG) injection 0-10 Units  nystatin (Mycostatin) 100,000 unit/gram powder 1 Application  vancomycin-diluent combo no.1 (Xellia) IVPB 1,750 mg  piperacillin-tazobactam-dextrose (Zosyn) IV 2.25 g  sodium chloride 0.9 % bolus 500 mL  norepinephrine (Levophed) 8 mg in dextrose 5% 250 mL (0.032 mg/mL) infusion (premix)  vancomycin (Vancocin) placeholder  pantoprazole (ProtoNix) EC tablet 40 mg  pantoprazole (ProtoNix) injection 40 mg  sennosides-docusate sodium (Judy-Colace) 8.6-50 mg per tablet 1 tablet  doxycycline (Vibramycin) in dextrose 5 % in water (D5W) 100 mL  mg  LORazepam (Ativan) injection 2 mg  magnesium sulfate IV 2 g  zinc oxide 20 % ointment 1 Application  nystatin (Mycostatin) cream  norepinephrine (Levophed) 8 mg in dextrose 5% 250 mL (0.032 mg/mL) infusion (premix)  heparin 1,000 unit/mL injection 2,000 Units  heparin 1,000 unit/mL injection 2,000 Units  nystatin (Mycostatin) ointment  acetaminophen (Tylenol) oral liquid 1,000 mg  albumin human 25 % solution 12.5 g  perflutren lipid microspheres (Definity) injection 0.5-10 mL of dilution  sulfur hexafluoride microsphr (Lumason) injection 24.28 mg  perflutren protein A microsphere (Optison) injection 0.5 mL  ipratropium-albuteroL (Duo-Neb) 0.5-2.5 mg/3 mL nebulizer solution 3 mL  sodium chloride 3 % nebulizer solution 3 mL  vancomycin-diluent combo no.1 (Xellia) IVPB 750 mg  sennosides-docusate sodium (Judy-Colace) 8.6-50 mg per tablet 1 tablet  acetaminophen (Tylenol) tablet 650 mg  doxycycline (Vibramycin) capsule 100 mg  magnesium oxide (Mag-Ox) tablet 400 mg  vasopressin (Vasostrict) 0.2 unit/mL infusion  norepinephrine (Levophed) 8 mg in dextrose 5% 250 mL (0.032 mg/mL) infusion (premix)  heparin 1,000 unit/mL injection 2,000 Units  heparin 1,000 unit/mL injection 2,000 Units  albumin human 25 % solution 12.5 g  vancomycin (Xellia) 1 g in 200 mL (Xellia) IVPB 1 g  ipratropium-albuteroL (Duo-Neb) 0.5-2.5  mg/3 mL nebulizer solution 3 mL  sodium chloride 3 % nebulizer solution 3 mL  albumin human 5 % infusion 12.5 g  heparin 1,000 unit/mL injection 2,000 Units  heparin 1,000 unit/mL injection 2,000 Units  vancomycin (Vancocin) capsule 125 mg  albumin human 25 % solution 12.5 g  heparin 1,000 unit/mL injection 2,000 Units  heparin 1,000 unit/mL injection 2,000 Units  ipratropium-albuteroL (Duo-Neb) 0.5-2.5 mg/3 mL nebulizer solution 3 mL  lidocaine (Xylocaine) 10 mg/mL (1 %) injection 50 mg  sodium phosphate 15 mmol in sodium chloride 0.9% 250 mL IV  sod phos di, mono-K phos mono (K Phos Neutral) tablet 250 mg  potassium, sodium phosphates (Phos-NaK) 280-160-250 mg packet 1 packet  doxycycline (Vibramycin) capsule 100 mg  fludrocortisone (Florinef) tablet 0.1 mg  gabapentin (Neurontin) capsule 100 mg  acetaminophen (Tylenol) tablet 1,000 mg  benzocaine-menthoL (Dermoplast) topical spray  vancomycin (Vancocin) capsule 125 mg  heparin 1,000 unit/mL injection 2,000 Units  heparin 1,000 unit/mL injection 2,000 Units  magnesium sulfate IV 2 g  albumin human 25 % solution 12.5 g  promethazine (Phenergan) injection 12.5 mg  ondansetron (Zofran) injection 4 mg  lidocaine (Xylocaine) 10 mg/mL (1 %) injection 50 mg  alteplase (Cathflo Activase) injection 2 mg  acetaminophen (Tylenol) tablet 650 mg  psyllium (Metamucil) 3.4 gram packet 1 packet  perflutren lipid microspheres (Definity) injection 0.5-10 mL of dilution  sulfur hexafluoride microsphr (Lumason) injection 24.28 mg  perflutren protein A microsphere (Optison) injection 0.5 mL  epoetin di-epbx (Retacrit) injection 10,000 Units  sulfur hexafluoride microsphr (Lumason) injection 24.28 mg  perflutren lipid microspheres (Definity) injection 0.5-10 mL of dilution  perflutren lipid microspheres (Definity) injection 0.5-10 mL of dilution  sulfur hexafluoride microsphr (Lumason) injection 24.28 mg  perflutren protein A microsphere (Optison) injection 0.5 mL  ammonium lactate  (Lac-Hydrin) 12 % lotion 1 Application  norepinephrine (Levophed) 8 mg in dextrose 5% 250 mL (0.032 mg/mL) infusion (premix)  fludrocortisone (Florinef) tablet 0.1 mg  albumin human 25 % solution 25 g  albumin human 25 % solution 25 g  meropenem (Merrem) 500 mg in sodium chloride 0.9 % 100 mL IV  potassium chloride 20 mEq in 100 mL IV premix  potassium chloride 20 mEq in 100 mL IV premix  magnesium sulfate IV 2 g  cefepime (Maxipime) 1 g in dextrose 5 % 50 mL IV  vancomycin-diluent combo no.1 (Xellia) IVPB 750 mg      Relevant Results  Labs  Results from last 72 hours   Lab Units 02/13/24 0440 02/12/24  0523   WBC AUTO x10*3/uL 10.7 10.3   HEMOGLOBIN g/dL 7.8* 8.2*   HEMATOCRIT % 25.3* 26.5*   PLATELETS AUTO x10*3/uL 191 173   NEUTROS PCT AUTO % 60.6  --    LYMPHS PCT AUTO % 21.9  --    MONOS PCT AUTO % 11.2  --    EOS PCT AUTO % 4.7  --      Results from last 72 hours   Lab Units 02/13/24 0440 02/12/24  0523   SODIUM mmol/L 130* 136   POTASSIUM mmol/L 3.7 3.4   CHLORIDE mmol/L 100 102   CO2 mmol/L 20* 24   BUN mg/dL 17 9   CREATININE mg/dL 2.40* 1.60   GLUCOSE mg/dL 183* 166*   CALCIUM mg/dL 8.3* 8.4*   ANION GAP mmol/L 10 10   EGFR mL/min/1.73m*2 21* 34*   PHOSPHORUS mg/dL 2.5  --      Results from last 72 hours   Lab Units 02/13/24  0440   ALK PHOS U/L 140*   BILIRUBIN TOTAL mg/dL 0.6   PROTEIN TOTAL g/dL 5.6*   ALT U/L 6   AST U/L 14   ALBUMIN g/dL 2.6*     Estimated Creatinine Clearance: 22.4 mL/min (A) (by C-G formula based on SCr of 2.4 mg/dL (H)).  C-Reactive Protein   Date Value Ref Range Status   12/25/2023 5.20 (H) 0.00 - 2.00 mg/dL Final     CRP   Date Value Ref Range Status   06/23/2023 19.9 (H) 0 - 2.0 MG/DL Final     Comment:     Performed at [address] [address]   05/22/2022 1.0 0 - 2.0 MG/DL Final     Comment:     Performed at [address] [address]     Microbiology  Susceptibility data from last 14 days.  Collected Specimen Info Organism  Ampicillin Aztreonam Cefazolin Cefepime Ceftazidime Ciprofloxacin Clindamycin Erythromycin Gentamicin Levofloxacin Oxacillin Piperacillin/Tazobactam Tetracycline Tobramycin   02/10/24 Tissue/Biopsy from Other (specify in comments) Proteus mirabilis S  S   S   S S  S R      Methicillin Resistant Staphylococcus aureus (MRSA)       R R   R  R      Pseudomonas aeruginosa                 02/10/24 Tissue/Biopsy from Wound/Tissue Proteus mirabilis S  S   S   S S  S R      Pseudomonas aeruginosa  S  S S S    S  S  S     Collected Specimen Info Organism Trimethoprim/Sulfamethoxazole Vancomycin   02/10/24 Tissue/Biopsy from Other (specify in comments) Proteus mirabilis S      Methicillin Resistant Staphylococcus aureus (MRSA) S S     Pseudomonas aeruginosa     02/10/24 Tissue/Biopsy from Wound/Tissue Proteus mirabilis S      Pseudomonas aeruginosa         Imaging  Transthoracic Echo (TTE) Limited    Result Date: 2/7/2024           [place] [address]            Phone [phone] TRANSTHORACIC ECHOCARDIOGRAM REPORT  Patient Name:      BASIA Gray Physician:    [id number] Littlejohn DO Study Date:        2/7/2024            Ordering Provider:    [id number] SANGITA LITTLEJOHN MRN/PID:           [id number]            Fellow: Accession#:        [id number]        Nurse: Date of Birth/Age: 1950 / 73 years Sonographer:          Rena QUARLES,                                                              YAEL, FABIAN Gender:            F                   Additional Staff: Height:            157.48 cm           Admit Date: Weight:                                Admission Status:     Inpatient - Routine BSA:               m2                  Department Location:  [facility] Blood  Pressure: 95 /42 mmHg Study Type:    TRANSTHORACIC ECHO (TTE) LIMITED Diagnosis/ICD: Hypotension, unspecified-I95.9; Mitral valve disorder-I05.9 Indication:    Limited to assess for MS, Hypotension CPT Codes:     Echo Limited-64874; Color Doppler-86451; Doppler Limited-29923 Patient History: Pertinent History: Hypotension, hx of MV endocarditis, PAD, DM2 ESRD on Hd. Study Detail: The following Echo studies were performed: 2D, M-Mode, Doppler and               color flow. Technically challenging study due to poor acoustic               windows and L Breast implant. Unable to obtain suprasternal notch               view.  PHYSICIAN INTERPRETATION: Left Ventricle: Left ventricular systolic function is normal. There are no regional wall motion abnormalities. The left ventricular cavity size is normal. Left ventricular diastolic filling was not assessed. Left Atrium: The left atrium is moderate to severely dilated. Right Ventricle: The right ventricle is normal in size. There is normal right ventricular global systolic function. Right Atrium: The right atrium was not well visualized. Aortic Valve: The aortic valve was not assessed. Aortic valve regurgitation was not assessed. Mitral Valve: The mitral valve is abnormal. There is evidence of mild mitral valve stenosis. The doppler estimated mean and peak diastolic pressure gradients are 4.0 mmHg and 6.6 mmHg respectively. There is severe mitral annular calcification. There is no evidence of mitral valve regurgitation. Tricuspid Valve: The tricuspid valve was not assessed. Tricuspid regurgitation was not assessed. Pulmonic Valve: The pulmonic valve is not well visualized. The pulmonic valve regurgitation was not assessed. Pericardium: There is no pericardial effusion noted. Aorta: The aortic root was not assessed.  CONCLUSIONS:  1. Left ventricular systolic function is normal.  2. The left atrium is moderate to severely dilated.  3. There is severe mitral annular  calcification. QUANTITATIVE DATA SUMMARY: LV DIASTOLIC FUNCTION:                        Normal Ranges: MV Peak E:    1.23 m/s (0.7-1.2 m/s) MV Peak A:    1.12 m/s (0.42-0.7 m/s) E/A Ratio:    1.10     (1.0-2.2) MV lateral e' 0.04 m/s MV medial e'  0.07 m/s MITRAL VALVE:                      Normal Ranges: MV Vmax:    1.28 m/s (<=1.3m/s) MV peak P.6 mmHg (<5mmHg) MV mean P.0 mmHg (<48mmHg)  RIGHT VENTRICLE: RV Basal 2.97 cm  [id number] Lemus DO Electronically signed on 2024 at 3:50:21 PM  ** Final **     Transthoracic Echo (TTE) Limited    Result Date: 2024           [place] [address]            Phone [phone] TRANSTHORACIC ECHOCARDIOGRAM REPORT  Patient Name:      BASIA Gray Physician:    [id number] Lemus DO Study Date:        2024            Ordering Provider:    [id number] REBECA RESENDEZ MRN/PID:           [id number]            Fellow: Accession#:        [id number]        Nurse: Date of Birth/Age: 1950 / 73 years Sonographer:          Jennifer WALL Gender:            F                   Additional Staff: Height:            157.48 cm           Admit Date:           2024 Weight:            98.88 kg            Admission Status:     Inpatient - Routine BSA:               1.98 m2             Department Location: Blood Pressure: 59 /49 mmHg Study Type:    TRANSTHORACIC ECHO (TTE) LIMITED Diagnosis/ICD: Acute on chronic diastolic (congestive) heart failure                (CHF)-I50.33 Indication:    Acute on chronic diastolic congestive heart failure CPT Codes:     Echo Limited-19102; Color Doppler-53035; Doppler Limited-57608 Patient History: Pertinent History: PAF Peripheralvascular disease low blood pressure HTN Breast                    Cancer Mixed Hyperlipidemia HF CVA Hypotension DMII  CAD CKD. Study Detail: The following Echo studies were performed: 2D, M-Mode, Doppler and               color flow. Technically challenging study due to body habitus,               patient lying in supine position, poor acoustic windows, prominent               lung artifact and Breast Prosthesis. Definity used as a contrast               agent for endocardial border definition. Unable to obtain               suprasternal notch view.  PHYSICIAN INTERPRETATION: Left Ventricle: Left ventricular systolic function is normal, with an estimated ejection fraction of 60-65%. There are no regional wall motion abnormalities. The left ventricular cavity size is normal. Left ventricular diastolic filling was indeterminate. Left Atrium: The left atrium is moderately dilated. Right Ventricle: The right ventricle is normal in size. There is normal right ventricular global systolic function. Right Atrium: The right atrium is normal in size. Aortic Valve: The aortic valve is trileaflet. There is no evidence of aortic valve regurgitation. The peak instantaneous gradient of the aortic valve is 5.3 mmHg. Mitral Valve: The mitral valve is normal in structure. There is severe mitral annular calcification. There is trace mitral valve regurgitation. Tricuspid Valve: The tricuspid valve is structurally normal. There is mild tricuspid regurgitation. Pulmonic Valve: The pulmonic valve is not well visualized. The pulmonic valve regurgitation was not well visualized. Pericardium: There is no pericardial effusion noted. Aorta: The aortic root is normal.  CONCLUSIONS:  1. Left ventricular systolic function is normal with a 60-65% estimated ejection fraction.  2. The left atrium is moderately dilated.  3. There is severe mitral annular calcification. QUANTITATIVE DATA SUMMARY: 2D MEASUREMENTS:                          Normal Ranges: LAs:           3.80 cm   (2.7-4.0cm) IVSd:          0.92 cm   (0.6-1.1cm) LVPWd:         1.09 cm   (0.6-1.1cm)  LVIDd:         2.98 cm   (3.9-5.9cm) LVIDs:         2.12 cm LV Mass Index: 41.2 g/m2 LV % FS        28.9 % LA VOLUME:                               Normal Ranges: LA Vol A4C:        58.3 ml    (22+/-6mL/m2) LA Vol A2C:        60.9 ml LA Vol BP:         62.1 ml LA Vol Index A4C:  29.4ml/m2 LA Vol Index A2C:  30.7 ml/m2 LA Vol Index BP:   31.3 ml/m2 LA Area A4C:       20.5 cm2 LA Area A2C:       20.1 cm2 LA Major Axis A4C: 6.1 cm LA Major Axis A2C: 5.6 cm LA Volume Index:   29.3 ml/m2 LA Vol A4C:        53.1 ml LA Vol A2C:        58.4 ml LV SYSTOLIC FUNCTION BY 2D PLANIMETRY (MOD):                     Normal Ranges: EF-A4C View: 68.2 % (>=55%) EF-A2C View: 65.9 % EF-Biplane:  67.8 % AORTIC VALVE:                         Normal Ranges: AoV Vmax:      1.15 m/s (<=1.7m/s) AoV Peak P.3 mmHg (<20mmHg) LVOT Max Shimon:  1.11 m/s (<=1.1m/s) LVOT VTI:      16.20 cm LVOT Diameter: 1.90 cm  (1.8-2.4cm) AoV Area,Vmax: 2.74 cm2 (2.5-4.5cm2) PULMONIC VALVE:                         Normal Ranges: PV Accel Time: 63 msec  (>120ms) PV Max Shimon:    1.0 m/s  (0.6-0.9m/s) PV Max P.3 mmHg  [id number] Herminio Lemus DO Electronically signed on 2024 at 7:58:25 AM  ** Final **     Upper extremity venous duplex bilateral    Result Date: 2024           [address] [address]            Phone [phone]  [facility] Lab Report  [facility]AL Patient Name:      BASIA Gray Physician:  [id number] Mini Busby MD, RPVI Study Date:        2024           Ordering Provider:  [id number] ERI BERMAN MRN/PID:           [id number]            Fellow: Accession#:        [id number]        Technologist:       Dara Parr RVT Date of Birth/Age: 1950 years Technologist 2: Gender:            F                   Encounter#:         [id number] Admission Status:  Inpatient            Location Performed: [facility]  Diagnosis/ICD: Right arm swelling-M79.89; Left arm swelling-M79.89 CPT Codes:     29271 Peripheral venous duplex scan for DVT complete  CONCLUSIONS:  Right Upper Venous: No evidence of acute deep vein thrombus visualized in the right upper extremity. Internal jugular vein was visualized in segments due to IV lines and bandages. Left Upper Venous: No evidence of acute deep vein thrombus visualized in the left upper extremity. Subclavian stent is noted and appears patent. There is a known occluded dialysis access noted.  Additional Findings: Technically difficult exam due to IV lines, bandages, and patient's positioning.  Imaging & Doppler Findings:  Right               Compressible Thrombus        Flow Internal Jugular        Yes        None   Spontaneous/Phasic Subclavian              Yes        None Subclavian Proximal     Yes        None   Spontaneous/Phasic Subclavian Mid          Yes        None Subclavian Distal       Yes        None   Spontaneous/Phasic Axillary                Yes        None       Pulsatile Brachial                Yes        None Cephalic                Yes        None Basilic                 Yes        None  Left                Compress Thrombus        Flow Internal Jugular      Yes      None       Pulsatile Subclavian            Yes      None Subclavian Proximal   Yes      None       Pulsatile Subclavian Mid        Yes      None       Pulsatile Subclavian Distal     Yes      None       Pulsatile Axillary              Yes      None   Spontaneous/Phasic Brachial              Yes      None Cephalic              Yes      None Basilic               Yes      None  [id number] Mini Busby MD, RPVI Electronically signed by [id number] Mini Busby MD, RPVI on 1/25/2024 at 4:06:13 PM  ** Final **     ECG 12 lead    Result Date: 1/24/2024  Sinus tachycardia  with 1st degree AV block Right bundle branch block Possible Lateral infarct , age undetermined Abnormal ECG  Confirmed by Yesika Nj ([id number]) on 1/24/2024 6:54:45 AM    XR tibia fibula right 2 views    Result Date: 1/22/2024  Interpreted By:  Real Mendieta, STUDY: XR TIBIA FIBULA RIGHT 2 VIEWS; 1/22/2024 2:15 pm   INDICATION: Signs/Symptoms:evaluate source of infection, osteomyelitis;   COMPARISON: None available.   ACCESSION NUMBER(S): [id number]   ORDERING CLINICIAN: BAN GIFFORD   TECHNIQUE: Views: AP and Lateral of the right tibia and fibula   FINDINGS: RESULT: There is no evidence for fracture or dislocation. Tricompartmental degenerative changes of the right knee. The tibia and fibula appear intact without bony erosion or evidence for osteomyelitis. No other bony or soft tissue abnormality is identified. No subcutaneous gas is noted.       No evidence for acute osseous abnormality. No bony erosion to suggest osteomyelitis.   Signed by: Real Mendieta 1/22/2024 3:15 PM Dictation workstation:   [id number]    XR ankle right 3+ views    Result Date: 1/22/2024  Interpreted By:  Real Mendieta, STUDY: XR ANKLE RIGHT 3+ VIEWS; 1/22/2024 2:15 pm   INDICATION: Signs/Symptoms:Evuluate source of infection, osteomyelitis;   COMPARISON: None available.   ACCESSION NUMBER(S): [id number]   ORDERING CLINICIAN: BAN GIFFORD   TECHNIQUE: Views: AP, Lateral, Oblique, right ankle   FINDINGS: RESULT: There is no evidence for fracture or dislocation. The ankle mortise is intact. Joint spaces appear adequately maintained. No bony erosion to suggest osteomyelitis. No bone lesion or soft tissue abnormality is identified. No subcutaneous gas is seen.       No evidence for acute osseous abnormality. No bony erosion to suggest osteomyelitis.   Signed by: Real Mendieta 1/22/2024 3:14 PM Dictation workstation:   [id number]    Transthoracic Echo (TTE) Complete    Result Date: 1/22/2024           [place] [address]            Phone [phone] TRANSTHORACIC ECHOCARDIOGRAM REPORT   Patient Name:      BASIA VEGA      Isaac Physician:   [id number] Fausto Rowe DO Study Date:        1/22/2024           Ordering Provider:   [id number] ERI PINO                                                             CULLEN MRN/PID:           [id number]            Fellow: Accession#:        [id number]        Nurse: Date of Birth/Age: 1950 / 73 years Sonographer:         Tabatha Page RDCS Gender:            F                   Additional Staff: Height:            157.00 cm           Admit Date: Weight:            75.00 kg            Admission Status:    Inpatient - Routine BSA:               1.76 m2             Department Location: [facility] Blood Pressure: 88 /49 mmHg Study Type:    TRANSTHORACIC ECHO (TTE) COMPLETE Diagnosis/ICD: Chronic combined systolic (congestive) and diastolic (congestive)                heart failure (CHF)-I50.42; Sepsis, unspecified organism-A41.9 Indication:    heart failure,septic shock CPT Codes:     Echo Complete w Full Doppler-42237 Patient History: BMI:               Obese >30 Pertinent History: Sepsis, PVD, A-Fib, CVA, Cancer and HTN. breast                    prosthesis/ca, ESRD,anemia,septic shock,heart failure. Study Detail: The following Echo studies were performed: 2D, M-Mode, Doppler and               color flow. Technically challenging study due to prosthesis,               prominent lung artifact and body habitus.  PHYSICIAN INTERPRETATION: Left Ventricle: Left ventricular systolic function is normal, with an estimated ejection fraction of 70%. There are no regional wall motion abnormalities. The left ventricular cavity size is normal. Left ventricular diastolic filling was indeterminate. Left Atrium: The left atrium is moderately dilated. Right Ventricle: The right ventricle is normal in size. There is normal right ventricular global systolic function. Right Atrium: The right atrium is normal in size. Aortic Valve: The aortic valve is trileaflet. There is no evidence of  aortic valve regurgitation. The peak instantaneous gradient of the aortic valve is 2.8 mmHg. Mitral Valve: The mitral valve is normal in structure. There is no evidence of mitral valve regurgitation. Tricuspid Valve: The tricuspid valve is structurally normal. There is trace tricuspid regurgitation. Pulmonic Valve: The pulmonic valve is not well visualized. The pulmonic valve regurgitation was not well visualized. Pericardium: There is no pericardial effusion noted. Aorta: The aortic root is normal.  CONCLUSIONS:  1. Left ventricular systolic function is normal with a 70% estimated ejection fraction.  2. The left atrium is moderately dilated.  3. Left ventricular diastolic filling indeterminate. QUANTITATIVE DATA SUMMARY: 2D MEASUREMENTS:                          Normal Ranges: LAs:           4.50 cm   (2.7-4.0cm) IVSd:          0.61 cm   (0.6-1.1cm) LVPWd:         0.88 cm   (0.6-1.1cm) LVIDd:         3.66 cm   (3.9-5.9cm) LV Mass Index: 41.9 g/m2 LV SYSTOLIC FUNCTION BY 2D PLANIMETRY (MOD):                     Normal Ranges: EF-A4C View: 68.3 % (>=55%) LV DIASTOLIC FUNCTION:                     Normal Ranges: MV Peak E: 1.19 m/s (0.7-1.2 m/s) MV Peak A: 0.90 m/s (0.42-0.7 m/s) E/A Ratio: 1.32     (1.0-2.2) MITRAL VALVE:                 Normal Ranges: MV DT: 192 msec (150-240msec) AORTIC VALVE:                         Normal Ranges: AoV Vmax:      0.84 m/s (<=1.7m/s) AoV Peak P.8 mmHg (<20mmHg) LVOT Max Shimon:  0.47 m/s (<=1.1m/s) LVOT Diameter: 1.90 cm  (1.8-2.4cm) AoV Area,Vmax: 1.57 cm2 (2.5-4.5cm2) TRICUSPID VALVE/RVSP:                   Normal Ranges: IVC Diam: 1.05 cm  [id number] [address] [facility]  Electronically signed on 2024 at 2:53:04 PM  ** Final **     XR foot left 3+ views    Result Date: 2024  Interpreted By:  Real Mendieta, STUDY: XR FOOT LEFT 1-2 VIEWS; 2024 4:15 pm   INDICATION: Signs/Symptoms:osteomyelitis. Pain   COMPARISON: 2023   ACCESSION NUMBER(S): [id number]   ORDERING  CLINICIAN: BAN GIFFORD   TECHNIQUE: Views:  AP, Lat, Oblique, left foot   FINDINGS: RESULT: There is no evidence for fracture or dislocation. 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 Mild hammertoe deformities. Joint spaces appear adequately maintained. Soft tissue ulceration of the posterior aspect of the heel however no evidence for bony erosion to suggest osteomyelitis.       No evidence for acute fracture or acute bony erosion to suggest osteomyelitis. Chronic changes as described.   Signed by: Real Mendieta 1/21/2024 7:15 PM Dictation workstation:   [id number]    CT chest abdomen pelvis wo IV contrast    Result Date: 1/21/2024  Interpreted By:  Maria Garcia, STUDY: CT CHEST ABDOMEN PELVIS WO CONTRAST;  1/20/2024 11:42 pm   INDICATION: Mental status change. Elevated white blood cell count with infectious workup.   COMPARISON: 08/20/2023   ACCESSION NUMBER(S): [id number]   ORDERING CLINICIAN: BAN GIFFORD   TECHNIQUE: CT of the chest, abdomen and pelvis was performed with no oral or intravenous contrast administered. Sagittal and coronal reformations were completed by the technologist at the acquisition scanner.   All CT examinations are performed with 1 or more of the following dose reduction techniques: Automated exposure control, adjustment of mA and/or kv according to patient's size, or use of iterative reconstruction techniques.   FINDINGS: Please note that the study is limited without intravenous contrast.   CHEST: Bilateral pleural effusions are present with right effusion moderately small in size and with the left effusion moderately small in size as well. There is shift of the heart and mediastinum to the left of midline due to atelectasis of the left upper lobe. There is consolidation throughout left lower lobe with air bronchograms noted. On the right, there is compressive atelectasis seen posteriorly in the right lower lobe.   There is mild reactive mediastinal  adenopathy seen at this time with mediastinal lymph nodes increased in size since prior study. Several of these mediastinal nodes are at the upper limits of normal measuring 8 mm in short axis diameter.   No pericardial effusion is present. The cardiac size is normal with mitral annulus calcification.   There has been prior bilateral mastectomy with reconstruction of the left breast with implant placement. Surgical clips are visible within the left axilla. Stent grafts are visible within the left upper extremity and within the left innominate, subclavian as well as axillary veins.   A peripherally calcified 1.8 cm nodule arises from the lower pole of left thyroid lobe.   ABDOMEN:   LIVER: Unremarkable.   BILE DUCTS: No intrahepatic or extrahepatic biliary ductal dilatation is seen.   GALLBLADDER: Cholelithiasis is observed with some calcification of the gallbladder wall demonstrated as well. No gallbladder wall thickening or pericholecystic fluid is evident.   PANCREAS: Unremarkable   SPLEEN: Unremarkable   ADRENAL GLANDS: Unremarkable   KIDNEYS AND URETERS: Kidneys are small in size with extensive renal artery calcification demonstrated.   PELVIS:   BLADDER: Amos catheter is present within the decompressed urinary bladder.   REPRODUCTIVE ORGANS: Calcification of the arcuate arteries within the uterus is seen. There is no uterine enlargement or adnexal mass.   BOWEL: A rectal balloon is seen. There is no abnormal distention of the intestinal tract.   VESSELS: There is atherosclerosis of the thoracoabdominal aorta and iliac arteries with calcification in the walls of the celiac, splenic, hepatic, and mesenteric arteries.   PERITONEUM/RETROPERITONEUM/LYMPH NODES: There is a minimal amount of ascites seen about the liver and spleen with mild presacral edema noted.   ABDOMINAL WALL: There is anasarca involving the soft tissues of the abdomen and pelvis. Postoperative change from ventral hernia repair is seen.   BONES:  Bilateral sacroiliitis is seen. There is lumbar dextroscoliosis. Chronic rotator cuff tear is present bilaterally with osteoarthritis of both shoulders, right greater than left.       Chest 1.  Moderately small bilateral pleural effusions with left upper lobe atelectasis and compressive atelectasis seen posteriorly in right lower lobe. A left lower lobe pneumonia is identified. There is extensive airspace consolidation within left lower lobe with air bronchograms observed. Mild mediastinal reactive adenopathy is present as well.   Abdomen-Pelvis 1.  Cholelithiasis without evidence of cholecystitis. 2. Small amount of ascites with mild presacral edema and anasarca within the soft tissues of the abdominal wall. 3. Renal atrophy with extensive vascular calcifications.     MACRO: None   Signed by: Maria Garcia 1/21/2024 8:30 AM Dictation workstation:   [id number]    CT head wo IV contrast    Result Date: 1/21/2024  Interpreted By:  Maria Garcia, STUDY: CT HEAD WO IV CONTRAST 1/20/2024 11:42 pm   INDICATION: Signs/Symptoms:mental status changes   COMPARISON: 12/11/2023   ACCESSION NUMBER(S): [id number]   ORDERING CLINICIAN: BAN GIFFORD   TECHNIQUE: Unenhanced axial images of the brain are completed.   All CT examinations are performed with 1 or more of the following dose reduction techniques: Automated exposure control, adjustment of mA and/or kv according to patient's size, or use of iterative reconstruction techniques.   FINDINGS: Helical unenhanced axial images of the brain demonstrate a mild to moderate degree of ventricular enlargement with proportionate widening of the sulci and sylvian fissures. There is no midline shift, mass effect, extra-axial fluid collection, or acute intracranial hemorrhage. There is diminished density seen in the periventricular white matter indicating chronic microvascular ischemic disease. There is calcified plaque seen within the distal vertebral and internal carotid arteries  bilaterally. No calvarial abnormality is seen.       Atrophy and chronic microvascular ischemic disease without acute intracranial process.   Signed by: Maria Garcia 1/21/2024 8:09 AM Dictation workstation:   [id number]    XR chest 1 view    Result Date: 1/20/2024  Interpreted By:  Brendon Perez, STUDY: XR CHEST 1 VIEW; 1/20/2024 5:03 pm   INDICATION: CLINICAL INFORMATION: Signs/Symptoms:Insertion right IJ central line, also left thoracentesis.   COMPARISON: 01/19/2024 at 1338 hours   ACCESSION NUMBER(S): [id number]   ORDERING CLINICIAN: BOZENA ANTONIO   TECHNIQUE: Portable chest one view.   FINDINGS: The cardiac size is indeterminate in view of the AP projection. There is no change in the right-sided dual port central venous catheter. There is a new right internal jugular venous catheter present with the tip at approximately same level as the dual port central venous catheter, overlying the mid to lower right atrium. There is no evidence for pneumothorax. Patient has a history of left thoracentesis without evidence for pneumothorax on the left. There is bilateral basilar alveolar infiltrate and effusions. Left effusion is decreased compared to the study from 1 day earlier.   Surgical clips are identified within left chest wall. Endovascular stent is identified in the distribution of the left subclavian artery.       1. Status post right-sided central venous catheter placement as described above with the tip overlying the mid to caudal aspect of the right atrium. There is no evidence for pneumothorax. 2. No evidence for left-sided pneumothorax after left-sided thoracentesis. Decrease in the left effusion. 3. Bilateral basilar infiltrates and effusions are present. Follow-up to assure complete clearing is suggested.   MACRO: none   Signed by: Brendon Perez 1/20/2024 5:11 PM Dictation workstation:   [id number]    XR chest 1 view    Result Date: 1/19/2024  Interpreted By:  Real Mendieta, STUDY: XR CHEST 1 VIEW;  1/19/2024 1:38 pm   INDICATION: Signs/Symptoms:dyspnea.   COMPARISON: 12/26/2023   ACCESSION NUMBER(S): [id number]   ORDERING CLINICIAN: EMELY GOLDSMITH   TECHNIQUE: 1 view of the chest was performed.   FINDINGS: There may be a minimal right pleural effusion. Slight prominence of the interstitium otherwise the right lung is clear. Improved appearance of the left lung with improved aeration of the right upper lobe. There is opacification of the left lower lobe possibly due to large pleural effusion which is similar to the prior study. No pneumothorax. Right-sided dual lumen central line catheter with tip at the proximal atrium. The cardiomediastinal silhouette is within normal limits. Left-sided subclavian stents are again noted.       Improved aeration and appearance of the left lung superiorly however there is a persistent large left pleural effusion and opacification of the left lower lobe.   Signed by: Real Mendieta 1/19/2024 1:44 PM Dictation workstation:   [id number]    Assessment/Plan   Septic shock-resolved  Left-sided pleural effusion   Left lower lobe pneumonia-treated  Proteus urinary tract infection-treated  History of MRSA endocarditis  History of C. difficile infection  Infected bilateral heel ulcers-wound culture growing Pseudomonas, Proteus, MRSA  [id number]  Type 2 diabetes with peripheral neuropathy with gangrene-Matson 3 versus 4  Severe malnutrition-prealbumin less than 3           Monitor off pressors  IV cefepime-coverage for Pseudomonas and Proteus  IV vancomycin-coverage for MRSA  Continue oral doxycycline-suppressive therapy  Continue oral vancomycin-patient at risk for relapse  Contact plus precautions-at risk for relapse   final recommendations based on culture results  Supportive care  Monitor temperature and WBC  Local care  Offloading  Stephen Coulter MD

## 2024-02-14 NOTE — PROGRESS NOTES
Ayanna Gross is a 73 y.o. female on day 26 of admission presenting with Septic shock (CMS/[facility]).    Critical Care Medicine Progress Note    Admitted on:     1/19/2024  Length of Stay: 26 day(s)     Interval History     She is off Levophed since last night  More awake today following commands no issues overnight.    Objective   Objective     Vitals:    02/12/24 0206   Weight: 89.6 kg (197 lb 8.5 oz)   Body mass index is 36.13 kg/m².        2/14/2024     1:00 AM 2/14/2024     2:00 AM 2/14/2024     3:00 AM 2/14/2024     4:00 AM 2/14/2024     5:00 AM 2/14/2024     7:00 AM 2/14/2024     8:30 AM   Vitals   Systolic 62 60 77 66 63  61   Diastolic 37 38 45 48 47  37   Heart Rate 96 93 96 95 96  103   Temp    36.8 °C (98.2 °F)  36.8 °C (98.2 °F)    Resp 19 16 20 17 18  21        Vent settings:       Intake/Output Summary (Last 24 hours) at 2/14/2024 1049  Last data filed at 2/14/2024 0000  Gross per 24 hour   Intake --   Output 400 ml   Net -400 ml     Physical exam:  -----------------  Awake and oriented to place and person  Cardiovascular:      Rate and Rhythm: Normal rate. Rhythm irregular.   Pulmonary:      Effort: Pulmonary effort is normal.   Abdominal:      General: Abdomen is flat.   Musculoskeletal:         General: Deformity present. Normal range of motion.      Cervical back: Normal range of motion.      Comments: Abscence of left hand first finger   Skin:     General: Skin is warm.      Capillary Refill: Capillary refill takes less than 2 seconds.      Comments: Skin tear healing to L heal  Wound to bilateral feet and sacrum     Medications     Scheduled Medications:   apixaban, 2.5 mg, oral, BID  cefepime, 1 g, intravenous, q24h  doxycylcine, 100 mg, oral, Daily  epoetin di or biosimilar, 10,000 Units, intravenous, Every Mon/Wed/Fri  escitalopram, 10 mg, oral, Nightly  fludrocortisone, 0.1 mg, oral, TID  gabapentin, 100 mg, oral, TID  heparin, 2,000 Units, intra-catheter, After Dialysis  heparin, 2,000 Units,  intra-catheter, After Dialysis  heparin, 2,000 Units, intra-catheter, After Dialysis  heparin, 2,000 Units, intra-catheter, After Dialysis  insulin lispro, 0-10 Units, subcutaneous, TID with meals  ipratropium-albuteroL, 3 mL, nebulization, q6h while awake  lidocaine, 5 mL, infiltration, Once  midodrine, 15 mg, oral, TID with meals  nystatin, , Topical, BID  nystatin, , Topical, BID  pantoprazole, 40 mg, oral, Daily   Or  pantoprazole, 40 mg, intravenous, Daily  perflutren lipid microspheres, 0.5-10 mL of dilution, intravenous, Once in imaging  perflutren protein A microsphere, 0.5 mL, intravenous, Once in imaging  psyllium, 1 packet, oral, BID  sodium chloride, 3 mL, nebulization, TID  sulfur hexafluoride microsphr, 2 mL, intravenous, Once in imaging  sulfur hexafluoride microsphr, 2 mL, intravenous, Once in imaging  vancomycin, 125 mg, oral, BID  vancomycin, 750 mg, intravenous, q24h  zinc oxide, 1 Application, Topical, BID       Continuous Medications:   norepinephrine, 0.01-3 mcg/kg/min, Last Rate: Stopped (02/12/24 1700)       PRN Medications:     Labs     Results from last 72 hours   Lab Units 02/13/24 0440 02/12/24  0523   GLUCOSE mg/dL 183* 166*   SODIUM mmol/L 130* 136   POTASSIUM mmol/L 3.7 3.4   CHLORIDE mmol/L 100 102   CO2 mmol/L 20* 24   BUN mg/dL 17 9   CREATININE mg/dL 2.40* 1.60   EGFR mL/min/1.73m*2 21* 34*   CALCIUM mg/dL 8.3* 8.4*   ALBUMIN g/dL 2.6*  --    MAGNESIUM mg/dL 1.50*  --    PHOSPHORUS mg/dL 2.5  --      Results from last 72 hours   Lab Units 02/13/24  0440   ALK PHOS U/L 140*   ALT U/L 6   AST U/L 14   BILIRUBIN TOTAL mg/dL 0.6   PROTEIN TOTAL g/dL 5.6*               Results from last 72 hours   Lab Units 02/13/24  0440 02/12/24  0523   WBC AUTO x10*3/uL 10.7 10.3   NRBC AUTO /100 WBCs 0.0 0.0   RBC AUTO x10*6/uL 2.50* 2.64*   HEMOGLOBIN g/dL 7.8* 8.2*   HEMATOCRIT % 25.3* 26.5*   MCV fL 101* 100   MCH pg 31.2 31.1   MCHC g/dL 30.8* 30.9*   RDW % 22.3* 22.8*   PLATELETS AUTO x10*3/uL  191 173         Lab Results   Component Value Date    BLOODCULT No growth at 4 days -  FINAL REPORT 01/19/2024    BLOODCULT No growth at 4 days -  FINAL REPORT 01/19/2024    GRAMSTAIN (4+) Abundant Polymorphonuclear leukocytes (A) 02/10/2024    GRAMSTAIN (3+) Moderate Gram negative bacilli (A) 02/10/2024     Lab Results   Component Value Date    URINECULTURE >100,000 Proteus mirabilis (A) 01/19/2024       Imaging and Diagnostic Studies     Lab/Radiology/Diagnostic Review:  Results for orders placed or performed during the hospital encounter of 01/19/24 (from the past 24 hour(s))   POCT GLUCOSE   Result Value Ref Range    POCT Glucose 163 (H) 74 - 99 mg/dL   POCT GLUCOSE   Result Value Ref Range    POCT Glucose 170 (H) 74 - 99 mg/dL   POCT GLUCOSE   Result Value Ref Range    POCT Glucose 212 (H) 74 - 99 mg/dL   Vancomycin   Result Value Ref Range    Vancomycin 13.9 10.0 - 20.0 ug/mL   Lavender Top   Result Value Ref Range    Extra Tube Hold for add-ons.    POCT GLUCOSE   Result Value Ref Range    POCT Glucose 163 (H) 74 - 99 mg/dL     Upper extremity venous duplex bilateral    Result Date: 1/25/2024           [place] [address]            Phone [phone]  Vascular Lab Report  [facility] UPPER EXTREMITY VENOUS DUPLEX BILATERAL Patient Name:      BASIATRAVIS Gray Physician:  [id number] Mini Busby MD, RPVI Study Date:        1/25/2024           Ordering Provider:  [id number] ERI BERMAN MRN/PID:           [id number]            Fellow: Accession#:        [id number]        Technologist:       Dara Parr RICHIE Date of Birth/Age: 1950 / 73 years Technologist 2: Gender:            F                   Encounter#:         [id number] Admission Status:  Inpatient           Location Performed: [facility]  Diagnosis/ICD: Right arm swelling-M79.89; Left arm swelling-M79.89 CPT Codes:     49805  Peripheral venous duplex scan for DVT complete  CONCLUSIONS:  Right Upper Venous: No evidence of acute deep vein thrombus visualized in the right upper extremity. Internal jugular vein was visualized in segments due to IV lines and bandages. Left Upper Venous: No evidence of acute deep vein thrombus visualized in the left upper extremity. Subclavian stent is noted and appears patent. There is a known occluded dialysis access noted.  Additional Findings: Technically difficult exam due to IV lines, bandages, and patient's positioning.  Imaging & Doppler Findings:  Right               Compressible Thrombus        Flow Internal Jugular        Yes        None   Spontaneous/Phasic Subclavian              Yes        None Subclavian Proximal     Yes        None   Spontaneous/Phasic Subclavian Mid          Yes        None Subclavian Distal       Yes        None   Spontaneous/Phasic Axillary                Yes        None       Pulsatile Brachial                Yes        None Cephalic                Yes        None Basilic                 Yes        None  Left                Compress Thrombus        Flow Internal Jugular      Yes      None       Pulsatile Subclavian            Yes      None Subclavian Proximal   Yes      None       Pulsatile Subclavian Mid        Yes      None       Pulsatile Subclavian Distal     Yes      None       Pulsatile Axillary              Yes      None   Spontaneous/Phasic Brachial              Yes      None Cephalic              Yes      None Basilic               Yes      None  [id number] Mini Busby MD, RPVI Electronically signed by [id number] Mini Busby MD, RPVI on 1/25/2024 at 4:06:13 PM  ** Final **     ECG 12 lead    Result Date: 1/24/2024  Sinus tachycardia  with 1st degree AV block Right bundle branch block Possible Lateral infarct , age undetermined Abnormal ECG Confirmed by Yesika Nj ([id number]) on 1/24/2024 6:54:45 AM    XR tibia fibula right 2 views    Result Date: 1/22/2024  Interpreted  By:  Real Mendieta, STUDY: XR TIBIA FIBULA RIGHT 2 VIEWS; 1/22/2024 2:15 pm   INDICATION: Signs/Symptoms:evaluate source of infection, osteomyelitis;   COMPARISON: None available.   ACCESSION NUMBER(S): [id number]   ORDERING CLINICIAN: BAN GIFFORD   TECHNIQUE: Views: AP and Lateral of the right tibia and fibula   FINDINGS: RESULT: There is no evidence for fracture or dislocation. Tricompartmental degenerative changes of the right knee. The tibia and fibula appear intact without bony erosion or evidence for osteomyelitis. No other bony or soft tissue abnormality is identified. No subcutaneous gas is noted.       No evidence for acute osseous abnormality. No bony erosion to suggest osteomyelitis.   Signed by: Real Mendieta 1/22/2024 3:15 PM Dictation workstation:   [id number]    XR ankle right 3+ views    Result Date: 1/22/2024  Interpreted By:  Real Mendieta, STUDY: XR ANKLE RIGHT 3+ VIEWS; 1/22/2024 2:15 pm   INDICATION: Signs/Symptoms:Evuluate source of infection, osteomyelitis;   COMPARISON: None available.   ACCESSION NUMBER(S): [id number]   ORDERING CLINICIAN: BAN GIFFORD   TECHNIQUE: Views: AP, Lateral, Oblique, right ankle   FINDINGS: RESULT: There is no evidence for fracture or dislocation. The ankle mortise is intact. Joint spaces appear adequately maintained. No bony erosion to suggest osteomyelitis. No bone lesion or soft tissue abnormality is identified. No subcutaneous gas is seen.       No evidence for acute osseous abnormality. No bony erosion to suggest osteomyelitis.   Signed by: Real Mendieta 1/22/2024 3:14 PM Dictation workstation:   [id number]    Transthoracic Echo (TTE) Complete    Result Date: 1/22/2024           [place] [id number]            Phone [phone] TRANSTHORACIC ECHOCARDIOGRAM REPORT  Patient Name:      BASIA Gray Physician:   [id number] Fausto Rowe DO Study Date:        1/22/2024           Ordering  Provider:   [id number] ERI BERMAN MRN/PID:           [id number]            Fellow: Accession#:        [id number]        Nurse: Date of Birth/Age: 1950 / 73 years Sonographer:         Tabatha Page RDCS Gender:            F                   Additional Staff: Height:            157.00 cm           Admit Date: Weight:            75.00 kg            Admission Status:    Inpatient - Routine BSA:               1.76 m2             Department Location: [facility] Blood Pressure: 88 /49 mmHg Study Type:    TRANSTHORACIC ECHO (TTE) COMPLETE Diagnosis/ICD: Chronic combined systolic (congestive) and diastolic (congestive)                heart failure (CHF)-I50.42; Sepsis, unspecified organism-A41.9 Indication:    heart failure,septic shock CPT Codes:     Echo Complete w Full Doppler-24390 Patient History: BMI:               Obese >30 Pertinent History: Sepsis, PVD, A-Fib, CVA, Cancer and HTN. breast                    prosthesis/ca, ESRD,anemia,septic shock,heart failure. Study Detail: The following Echo studies were performed: 2D, M-Mode, Doppler and               color flow. Technically challenging study due to prosthesis,               prominent lung artifact and body habitus.  PHYSICIAN INTERPRETATION: Left Ventricle: Left ventricular systolic function is normal, with an estimated ejection fraction of 70%. There are no regional wall motion abnormalities. The left ventricular cavity size is normal. Left ventricular diastolic filling was indeterminate. Left Atrium: The left atrium is moderately dilated. Right Ventricle: The right ventricle is normal in size. There is normal right ventricular global systolic function. Right Atrium: The right atrium is normal in size. Aortic Valve: The aortic valve is trileaflet. There is no evidence of aortic valve regurgitation. The peak instantaneous gradient of the aortic valve is 2.8 mmHg. Mitral Valve: The mitral valve is  normal in structure. There is no evidence of mitral valve regurgitation. Tricuspid Valve: The tricuspid valve is structurally normal. There is trace tricuspid regurgitation. Pulmonic Valve: The pulmonic valve is not well visualized. The pulmonic valve regurgitation was not well visualized. Pericardium: There is no pericardial effusion noted. Aorta: The aortic root is normal.  CONCLUSIONS:  1. Left ventricular systolic function is normal with a 70% estimated ejection fraction.  2. The left atrium is moderately dilated.  3. Left ventricular diastolic filling indeterminate. QUANTITATIVE DATA SUMMARY: 2D MEASUREMENTS:                          Normal Ranges: LAs:           4.50 cm   (2.7-4.0cm) IVSd:          0.61 cm   (0.6-1.1cm) LVPWd:         0.88 cm   (0.6-1.1cm) LVIDd:         3.66 cm   (3.9-5.9cm) LV Mass Index: 41.9 g/m2 LV SYSTOLIC FUNCTION BY 2D PLANIMETRY (MOD):                     Normal Ranges: EF-A4C View: 68.3 % (>=55%) LV DIASTOLIC FUNCTION:                     Normal Ranges: MV Peak E: 1.19 m/s (0.7-1.2 m/s) MV Peak A: 0.90 m/s (0.42-0.7 m/s) E/A Ratio: 1.32     (1.0-2.2) MITRAL VALVE:                 Normal Ranges: MV DT: 192 msec (150-240msec) AORTIC VALVE:                         Normal Ranges: AoV Vmax:      0.84 m/s (<=1.7m/s) AoV Peak P.8 mmHg (<20mmHg) LVOT Max Shimon:  0.47 m/s (<=1.1m/s) LVOT Diameter: 1.90 cm  (1.8-2.4cm) AoV Area,Vmax: 1.57 cm2 (2.5-4.5cm2) TRICUSPID VALVE/RVSP:                   Normal Ranges: IVC Diam: 1.05 cm  [id number] [facility] [facility] DENNIS Electronically signed on 2024 at 2:53:04 PM  ** Final **     XR foot left 3+ views    Result Date: 2024  Interpreted By:  Real Mendieta, STUDY: XR FOOT LEFT 1-2 VIEWS; 2024 4:15 pm   INDICATION: Signs/Symptoms:osteomyelitis. Pain   COMPARISON: 2023   ACCESSION NUMBER(S): [id number]   ORDERING CLINICIAN: BAN GIFFORD   TECHNIQUE: Views:  AP, Lat, Oblique, left foot   FINDINGS: RESULT: There is no evidence for fracture or  dislocation. 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 Mild hammertoe deformities. Joint spaces appear adequately maintained. Soft tissue ulceration of the posterior aspect of the heel however no evidence for bony erosion to suggest osteomyelitis.       No evidence for acute fracture or acute bony erosion to suggest osteomyelitis. Chronic changes as described.   Signed by: Real Mendieta 1/21/2024 7:15 PM Dictation workstation:   [id number]    CT chest abdomen pelvis wo IV contrast    Result Date: 1/21/2024  Interpreted By:  Maria Garcia, STUDY: CT CHEST ABDOMEN PELVIS WO CONTRAST;  1/20/2024 11:42 pm   INDICATION: Mental status change. Elevated white blood cell count with infectious workup.   COMPARISON: 08/20/2023   ACCESSION NUMBER(S): [id number]   ORDERING CLINICIAN: BAN GIFFORD   TECHNIQUE: CT of the chest, abdomen and pelvis was performed with no oral or intravenous contrast administered. Sagittal and coronal reformations were completed by the technologist at the acquisition scanner.   All CT examinations are performed with 1 or more of the following dose reduction techniques: Automated exposure control, adjustment of mA and/or kv according to patient's size, or use of iterative reconstruction techniques.   FINDINGS: Please note that the study is limited without intravenous contrast.   CHEST: Bilateral pleural effusions are present with right effusion moderately small in size and with the left effusion moderately small in size as well. There is shift of the heart and mediastinum to the left of midline due to atelectasis of the left upper lobe. There is consolidation throughout left lower lobe with air bronchograms noted. On the right, there is compressive atelectasis seen posteriorly in the right lower lobe.   There is mild reactive mediastinal adenopathy seen at this time with mediastinal lymph nodes increased in size since prior study. Several of these mediastinal nodes are at  the upper limits of normal measuring 8 mm in short axis diameter.   No pericardial effusion is present. The cardiac size is normal with mitral annulus calcification.   There has been prior bilateral mastectomy with reconstruction of the left breast with implant placement. Surgical clips are visible within the left axilla. Stent grafts are visible within the left upper extremity and within the left innominate, subclavian as well as axillary veins.   A peripherally calcified 1.8 cm nodule arises from the lower pole of left thyroid lobe.   ABDOMEN:   LIVER: Unremarkable.   BILE DUCTS: No intrahepatic or extrahepatic biliary ductal dilatation is seen.   GALLBLADDER: Cholelithiasis is observed with some calcification of the gallbladder wall demonstrated as well. No gallbladder wall thickening or pericholecystic fluid is evident.   PANCREAS: Unremarkable   SPLEEN: Unremarkable   ADRENAL GLANDS: Unremarkable   KIDNEYS AND URETERS: Kidneys are small in size with extensive renal artery calcification demonstrated.   PELVIS:   BLADDER: Amos catheter is present within the decompressed urinary bladder.   REPRODUCTIVE ORGANS: Calcification of the arcuate arteries within the uterus is seen. There is no uterine enlargement or adnexal mass.   BOWEL: A rectal balloon is seen. There is no abnormal distention of the intestinal tract.   VESSELS: There is atherosclerosis of the thoracoabdominal aorta and iliac arteries with calcification in the walls of the celiac, splenic, hepatic, and mesenteric arteries.   PERITONEUM/RETROPERITONEUM/LYMPH NODES: There is a minimal amount of ascites seen about the liver and spleen with mild presacral edema noted.   ABDOMINAL WALL: There is anasarca involving the soft tissues of the abdomen and pelvis. Postoperative change from ventral hernia repair is seen.   BONES: Bilateral sacroiliitis is seen. There is lumbar dextroscoliosis. Chronic rotator cuff tear is present bilaterally with osteoarthritis of  both shoulders, right greater than left.       Chest 1.  Moderately small bilateral pleural effusions with left upper lobe atelectasis and compressive atelectasis seen posteriorly in right lower lobe. A left lower lobe pneumonia is identified. There is extensive airspace consolidation within left lower lobe with air bronchograms observed. Mild mediastinal reactive adenopathy is present as well.   Abdomen-Pelvis 1.  Cholelithiasis without evidence of cholecystitis. 2. Small amount of ascites with mild presacral edema and anasarca within the soft tissues of the abdominal wall. 3. Renal atrophy with extensive vascular calcifications.     MACRO: None   Signed by: Maria Garcia 1/21/2024 8:30 AM Dictation workstation:   [id number]    CT head wo IV contrast    Result Date: 1/21/2024  Interpreted By:  Maria Garcia, STUDY: CT HEAD WO IV CONTRAST 1/20/2024 11:42 pm   INDICATION: Signs/Symptoms:mental status changes   COMPARISON: 12/11/2023   ACCESSION NUMBER(S): [id number]   ORDERING CLINICIAN: BAN GIFFORD   TECHNIQUE: Unenhanced axial images of the brain are completed.   All CT examinations are performed with 1 or more of the following dose reduction techniques: Automated exposure control, adjustment of mA and/or kv according to patient's size, or use of iterative reconstruction techniques.   FINDINGS: Helical unenhanced axial images of the brain demonstrate a mild to moderate degree of ventricular enlargement with proportionate widening of the sulci and sylvian fissures. There is no midline shift, mass effect, extra-axial fluid collection, or acute intracranial hemorrhage. There is diminished density seen in the periventricular white matter indicating chronic microvascular ischemic disease. There is calcified plaque seen within the distal vertebral and internal carotid arteries bilaterally. No calvarial abnormality is seen.       Atrophy and chronic microvascular ischemic disease without acute intracranial process.    Signed by: Maria Garcia 1/21/2024 8:09 AM Dictation workstation:   [id number]    XR chest 1 view    Result Date: 1/20/2024  Interpreted By:  Brendon Perez, STUDY: XR CHEST 1 VIEW; 1/20/2024 5:03 pm   INDICATION: CLINICAL INFORMATION: Signs/Symptoms:Insertion right IJ central line, also left thoracentesis.   COMPARISON: 01/19/2024 at 1338 hours   ACCESSION NUMBER(S): [id number]   ORDERING CLINICIAN: BOZENA ANTNOIO   TECHNIQUE: Portable chest one view.   FINDINGS: The cardiac size is indeterminate in view of the AP projection. There is no change in the right-sided dual port central venous catheter. There is a new right internal jugular venous catheter present with the tip at approximately same level as the dual port central venous catheter, overlying the mid to lower right atrium. There is no evidence for pneumothorax. Patient has a history of left thoracentesis without evidence for pneumothorax on the left. There is bilateral basilar alveolar infiltrate and effusions. Left effusion is decreased compared to the study from 1 day earlier.   Surgical clips are identified within left chest wall. Endovascular stent is identified in the distribution of the left subclavian artery.       1. Status post right-sided central venous catheter placement as described above with the tip overlying the mid to caudal aspect of the right atrium. There is no evidence for pneumothorax. 2. No evidence for left-sided pneumothorax after left-sided thoracentesis. Decrease in the left effusion. 3. Bilateral basilar infiltrates and effusions are present. Follow-up to assure complete clearing is suggested.   MACRO: none   Signed by: Brendon Perez 1/20/2024 5:11 PM Dictation workstation:   [id number]    XR chest 1 view    Result Date: 1/19/2024  Interpreted By:  Real Mendieta, STUDY: XR CHEST 1 VIEW; 1/19/2024 1:38 pm   INDICATION: Signs/Symptoms:dyspnea.   COMPARISON: 12/26/2023   ACCESSION NUMBER(S): [id number]   ORDERING CLINICIAN:  EMELY GOLDSMITH   TECHNIQUE: 1 view of the chest was performed.   FINDINGS: There may be a minimal right pleural effusion. Slight prominence of the interstitium otherwise the right lung is clear. Improved appearance of the left lung with improved aeration of the right upper lobe. There is opacification of the left lower lobe possibly due to large pleural effusion which is similar to the prior study. No pneumothorax. Right-sided dual lumen central line catheter with tip at the proximal atrium. The cardiomediastinal silhouette is within normal limits. Left-sided subclavian stents are again noted.       Improved aeration and appearance of the left lung superiorly however there is a persistent large left pleural effusion and opacification of the left lower lobe.   Signed by: Real Mendieta 1/19/2024 1:44 PM Dictation workstation:   [id number]         Assessment / Plan       PROBLEM LIST:  - End stage heart failure with preserved EF - on vasopressors  - End stage renal disease on HD  - Cardiogenic shock - will continue to wean down levo her blood pressure measured 1 does not reflect of the actual blood pressure as the patient is fully awake despite readings of systolic in the 40s and 50s arterial line was also inaccurate due to vasculopathy  - Acute metabolic encephalopathy  - Failure to wean from vasopressors  - Goals of care discussion    MANAGEMENT PLAN:  - Start weaning levo and assess mental status  - received HD today   -Already on Fluorinef tid  - Palliative care consult  - Cardiology consultation - patient's daughter declined a RHC  - Peer to peer done with insurance, NOT approved  - Family will update us with goals of care  - Cont Doxy and Vanc PO. Meropenem pseudomonas in swab culture from right heal   Appreciate ID consult.  - Routine ICU care  - PT/OT  Overall prognosis poor due to chronic medical problems including end-stage renal disease heart failure patient is bedridden with decubitus ulcers  I have  personally interviewed and examined the patient.  I have personally verified elements of the exam listed above. Interval changes or irregularities are as noted.  I have personally and independently reviewed laboratory, radiographic and procedural data.  I have personally reviewed the problem list above and concur. Changes, if any, are noted.  I have personally reviewed the plan list above and concur. Changes, if any, are noted.    The patient has high probability of compromise including but not limited to organ system failure, mechanical respiratory and circulatory support, cardiac arrest and death. I have discussed this in detail with the family / caregivers.    No change in plan overall    Tarek Gharibeh Tundelincoln Gross is a 73 y.o. female on day 26 of admission presenting with Septic shock (CMS/HCC).

## 2024-02-14 NOTE — CARE PLAN
Problem: Respiratory  Goal: Patent airway maintained this shift  Outcome: Progressing     Problem: Respiratory  Goal: Verbalize decreased shortness of breath this shift  Outcome: Progressing   Present in rounds. No changes needed from resp at this time.

## 2024-02-14 NOTE — PROGRESS NOTES
Palliative Care Progress Note    Date of Admission: 1/19/2024    Patient is a 73 y.o. female admitted with Septic shock (CMS/[facility]).  Has been off Levophed about 2 days now    Scheduled medications  apixaban, 2.5 mg, oral, BID  cefepime, 1 g, intravenous, q24h  doxycylcine, 100 mg, oral, Daily  epoetin di or biosimilar, 10,000 Units, intravenous, Every Mon/Wed/Fri  escitalopram, 10 mg, oral, Nightly  fludrocortisone, 0.1 mg, oral, TID  gabapentin, 100 mg, oral, TID  heparin, 2,000 Units, intra-catheter, After Dialysis  heparin, 2,000 Units, intra-catheter, After Dialysis  heparin, 2,000 Units, intra-catheter, After Dialysis  heparin, 2,000 Units, intra-catheter, After Dialysis  insulin lispro, 0-10 Units, subcutaneous, TID with meals  ipratropium-albuteroL, 3 mL, nebulization, q6h while awake  lidocaine, 5 mL, infiltration, Once  midodrine, 15 mg, oral, TID with meals  nystatin, , Topical, BID  nystatin, , Topical, BID  pantoprazole, 40 mg, oral, Daily   Or  pantoprazole, 40 mg, intravenous, Daily  perflutren lipid microspheres, 0.5-10 mL of dilution, intravenous, Once in imaging  perflutren protein A microsphere, 0.5 mL, intravenous, Once in imaging  psyllium, 1 packet, oral, BID  sulfur hexafluoride microsphr, 2 mL, intravenous, Once in imaging  sulfur hexafluoride microsphr, 2 mL, intravenous, Once in imaging  vancomycin, 125 mg, oral, BID  vancomycin (Xellia) 1 g in 200 mL, 1 g, intravenous, Once  zinc oxide, 1 Application, Topical, BID      Continuous medications  norepinephrine, 0.01-3 mcg/kg/min, Last Rate: Stopped (02/12/24 1700)      PRN medications  PRN medications: acetaminophen, albumin human, alteplase, ammonium lactate, benzocaine-menthoL, dextrose 10 % in water (D10W), dextrose, glucagon, ondansetron, oxygen, sennosides-docusate sodium, vancomycin     Results for orders placed or performed during the hospital encounter of 01/19/24 (from the past 24 hour(s))   POCT GLUCOSE   Result Value Ref Range     POCT Glucose 212 (H) 74 - 99 mg/dL   Vancomycin   Result Value Ref Range    Vancomycin 13.9 10.0 - 20.0 ug/mL   Lavender Top   Result Value Ref Range    Extra Tube Hold for add-ons.    POCT GLUCOSE   Result Value Ref Range    POCT Glucose 163 (H) 74 - 99 mg/dL   POCT GLUCOSE   Result Value Ref Range    POCT Glucose 229 (H) 74 - 99 mg/dL   POCT GLUCOSE   Result Value Ref Range    POCT Glucose 133 (H) 74 - 99 mg/dL      Transthoracic Echo (TTE) Limited  Result Date: 2/7/2024  PHYSICIAN INTERPRETATION: Left Ventricle: Left ventricular systolic function is normal, with an estimated ejection fraction of 60-65%. There are no regional wall motion abnormalities. The left ventricular cavity size is normal. Left ventricular diastolic filling was indeterminate. Left Atrium: The left atrium is moderately dilated. Right Ventricle: The right ventricle is normal in size. There is normal right ventricular global systolic function. Right Atrium: The right atrium is normal in size. Aortic Valve: The aortic valve is trileaflet. There is no evidence of aortic valve regurgitation. The peak instantaneous gradient of the aortic valve is 5.3 mmHg. Mitral Valve: The mitral valve is normal in structure. There is severe mitral annular calcification. There is trace mitral valve regurgitation. Tricuspid Valve: The tricuspid valve is structurally normal. There is mild tricuspid regurgitation. Pulmonic Valve: The pulmonic valve is not well visualized. The pulmonic valve regurgitation was not well visualized. Pericardium: There is no pericardial effusion noted. Aorta: The aortic root is normal.  CONCLUSIONS:  1. Left ventricular systolic function is normal with a 60-65% estimated ejection fraction.  2. The left atrium is moderately dilated.  3. There is severe mitral annular calcification.        Vitals:    02/14/24 1600   BP: (!) 66/28   Pulse: 93   Resp: 19   Temp:    SpO2: 94%     Physical Exam  Vitals and nursing note reviewed.    Constitutional:       General: She is not in acute distress.     Appearance: She is not toxic-appearing.   HENT:      Head: Normocephalic and atraumatic.      Mouth/Throat:      Mouth: Mucous membranes are moist.      Pharynx: Oropharynx is clear.   Eyes:      Extraocular Movements: Extraocular movements intact.      Pupils: Pupils are equal, round, and reactive to light.   Cardiovascular:      Rate and Rhythm: Normal rate and regular rhythm.      Pulses: Normal pulses.      Heart sounds: Normal heart sounds.   Pulmonary:      Effort: Pulmonary effort is normal. No respiratory distress.      Breath sounds: Normal breath sounds. No wheezing or rales.   Abdominal:      General: There is no distension.      Palpations: Abdomen is soft.      Tenderness: There is no abdominal tenderness. There is no guarding.   Musculoskeletal:      Cervical back: Normal range of motion.      Right lower leg: No edema.      Left lower leg: No edema.      Comments: Left foot wrapped   Skin:     General: Skin is warm and dry.      Findings: Bruising present.      Comments: Sacrum not assessed but documented wounds, right heel dressing intact   Neurological:      General: No focal deficit present.      Mental Status: She is alert and oriented to person, place, and time.   Psychiatric:         Mood and Affect: Mood normal.         Behavior: Behavior normal.          Assessment/Plan   Septic shock (CMS/HCC)   IMP:    End stage heart failure with preserved EF - remains on vasopressors with sbp 50-60's 0.02 levophed. Declined [facility]  End stage renal disease on HD - mgmt per Dr. Mondragon, had dialysis this AM. Removed 1.5 liters  Cardiogenic shock - 2/2 chf end stage  Infection of left foot wound- Cultures + pseudomonas and klebsiella. ID following. Now on Merrem IV  Hx C.diff - on vanco suppressive therapy, has FMS in place    Palliative Care   FULL CODE  Pt is a capable decision maker, but makes decisions with input from daughters. Meka  daughter is also hc-POA.   Patient remains without a discharge plan. She does not currently qualify for LTACH. Levophed is currently being titrated to mentation as her baseline blood pressure is low coupled with poor vasculature we may not be getting a true picture of her real numbers.     As of now there are not many options for discharge as the patient and family does not have interest in a palliative/comfort approach. Options:    SNF/LTC with outpatient dialysis - not realistic due to low BP's she will likely be sent back to ED regardless of mental status  Home with outpatient dialysis - again, not realistic due to level of care she will require and now on IV antibiotics for left foot wound infection  Hospice either at home or IPU - she has declined this option in the past. I tried to call daughter Meka and VM was full. We need to re-explore this option    Advance Directives Info: Patient has advance directive, copy in chart  Discharge Planning: Currently on hold despite being off Levophed., back on antibiotics, wound culture to heel pending.    Goals have been previously established, patient is a full code, aggressive disease modifying model of care.  No acute palliative needs at this point, we will sign off.  Please reconsult if needed.    Total time 20 minutes    Jazmin Sanchez, APRN-CNP

## 2024-02-14 NOTE — NURSING NOTE
"Informed by CHRISTOPHE Miles that pt refused dialysis over night. Ginger brought the machine in to set it up & the pt said \"2AM dialysis is crazy\" & requested to have dialysis during the day.   Will report to day shift.   "

## 2024-02-14 NOTE — PROGRESS NOTES
Vancomycin Dosing by Pharmacy- INITIAL (HEMODIALYSIS)    Ayanna Gross is a 73 y.o. year old female who Pharmacy has been consulted for vancomycin dosing for Vancomycin Indications: Skin & Soft Tissue. Based on the patient's indication and renal status this patient will be dosed based on a Pre-HD level of 20-25 mcg/mL.     Patient is currently on hemodialysis User Schedule (TBD) Getting dialysis today - dose of vanco will be given after dialysis today.    Visit Vitals  BP (!) 66/38   Pulse 97   Temp 36.8 °C (98.2 °F) (Temporal)   Resp 16           Lab Results   Component Value Date    CREATININE 2.40 (H) 02/13/2024    CREATININE 1.60 02/12/2024    CREATININE 2.70 (H) 02/11/2024    CREATININE 2.00 (H) 02/10/2024       I/O last 3 completed shifts:  In: - (0 mL/kg)   Out: 750 (8.1 mL/kg) [Stool:750]  Dosing Weight: 93 kg     Assessment/Plan     Initial Loading Dosing:will not be given a loading dose   Vancomycin maintenance dose: 1000 mg after each dialysis session User Schedule (TBD)  Pre-HD level will be obtained on 2/16 at 0500. May be obtained sooner if clinically indicated. Will be adjusted based on any changes to dialysis.  Will continue to monitor renal function daily while on vancomycin and order serum creatinine at least every 48 hours if not already ordered.  Follow for continued vancomycin needs, clinical response, and signs/symptoms of toxicity.     Kendall Alfonso, PharmD

## 2024-02-14 NOTE — NURSING NOTE
Wound Wednesday rounds completed. Patient has numerous skin impairments, with treatments being continued as ordered.   Pt bathed with CHG soap.   Photos of all areas updated, see . Some photos are not able to be formatted to fit the flowsheets, see below.   All dressings replaced.   Zinc paste and nystatin applied to buttocks, jeaneth area and abdominal skin folds as ordered. New scaral border dressing applied.   FMS remains intact with intermittent stool leakage. Maintaining Q4H flushes as ordered.   Patient is on envision alternating pressure mattress, with bilateral offloading boots in place.   Has nutritional supplements ordered, including Christian and mighty shake, however patient refuses to drink them despite much education and encouragement.  Turns Q 2 hours with wedges and pillows utilized to assist in positioning.     Sacral wound:  [email or web address]&token=[email or web address]%9CsC5uyShmtHzVUBJKh6xQq6Y7H7DLyyttJTOfB%2BjMN%3ItIqNuHT2XrutfVXDQp6Y%4C0ZOiOU6aiHZ6%3EUBjRB8u5Jhstst3QS4vVXzjgJpm%4SKUphg1gUzanWBnWCTqTR%3D%3D%7Cuhepicprdecp105_prdapp1%7CP2P.REPORTMEDIAVIEWER%2N58659%7C1&action=21&page=1&ubaUlbkq=136&ytfRnjrfa=4239&z=[id number]    L heel/ankle:  [email or web address]&[email or web address]&token=[email or web address]%3ItK0dzNdnmVpKKJNKtM2h5UX8qRb5wpFbRQYlPHgirY7lhM%0EoZ541ZvlBga4F3vf%0FNdKIPT1kWYRaL85qe7zBj6wdK3WASLtrLr70S1nXD8s%2BUR%2FVoyBsdGESZPUg%3D%3D%7Cuhepicprdecp105_prdapp1%7CP2P.REPORTMEDIAVIEWER%9P54225%7C1&action=21&page=1&ynvPxqws=133&mwnXffeyx=6701&f=[id number]    L lateral  foot:  [email or web address]&filename=*&token=npq9tP8GSUtFDL6rXA2GkTTVc%4QpH8giTgrhLhWIGRSpDMJcQYI2BUPOujhkV97Xg1Gc69znIezeLn8nCZLnkB4pjS7Nqf7uxpD18V6HZKDVXS6Er9P4ed9EJafy7j1Os9y57y%6E7XEfEihZHgONn4fq%3D%3D%7Cuhepicprdecp105_prdapp1%7CP2P.REPORTMEDIAVIEWER%7P43447%7C1&action=21&page=1&zjqSlamw=071&jvaEuxiqh=8583&h=[id number]    R heel:   [email or web address]://[email or web address]?[email or web address]&filename=*&token=[id number]%1WnX2gpQjsvBlGAWTArHzl60SFdkGF0bHZRO0zuJbdzxv8tUooa7H8Smn%2BZ6%3TEZ5yx%1G7RLHRNXDW7gmRtHUNNrKoNCiq%6GINd052ySRMT4uCTid4yfqdLAhtXQOiLGs%3D%3D%7Cuhepicprdecp105_prdapp1%7CP2P.REPORTMEDIAVIEWER%0M98915%7C1&action=21&page=1&hswDfqrp=293&rxjMjvfzi=4705&p=3gjo1s71hp40yw63s39x4r53o2206bml

## 2024-02-15 NOTE — CARE PLAN
Problem: Respiratory  Goal: Minimize anxiety/maximize coping throughout shift  Outcome: Progressing  Goal: Minimal/no exertional discomfort or dyspnea this shift  Outcome: Progressing  Goal: Verbalize decreased shortness of breath this shift  Outcome: Progressing

## 2024-02-15 NOTE — PROGRESS NOTES
Patient not medically clear. Patient remains in the ICU. Patient is off pressor support. Patient to have dialysis tomorrow. [facility] spoke to patient's daughter, Meka, regarding discharge plan. Patient will likely go to SNF. Will follow.     **PATIENT DOES NOT HAVE A SAFE DISCHARGE PLAN      Shauna Jensen RN

## 2024-02-15 NOTE — PROGRESS NOTES
CONSULT PROGRESS NOTES    SERVICE DATE: 2/15/2024   SERVICE TIME: 11:18 AM    CONSULTING SERVICE: Nephrology    ASSESSMENT AND PLAN   1.  End-stage renal disease  2.  Hypotension  3.  Hyponatremia  4.  Hypokalemia  5.  Anemia of chronic kidney disease     Dialysis tomorrow with Tablo using low temperature and minimal fluid removal.  She has been tolerating roughly 1.5 L volume removal off of pressors and with minimal IV albumin.  Now off of pressors for about 3 days now.  Hyponatremia from volume overload in this patient with anuria.  Hypokalemia improved, now using 3K bath.  I would not replace the potassium.  No need for daily chemistry panels.  Hemoglobin is low, using Retacrit 10,000 units IV 3 times a week with dialysis.  None of her options for disposition are great, as delineated by myself and numerous prior notes and the palliative care team.    She remains full code, despite numerous attempts from ICU team, palliative care, and myself to redefine and be more realistic with her goals of care.  Attempt dialysis tomorrow again off pressors and even without any as needed albumin.  From a mental status standpoint, she seems to be tolerating her blood pressure where it is.    SUBJECTIVE  INTERVAL HPI: She feels okay.  No new issues.  Numerous family members present at bedside, patient does have an appetite, eating . Genet yet again.  She remains off of pressors.    MEDICATIONS:  apixaban, 2.5 mg, oral, BID  cefepime, 1 g, intravenous, q24h  doxycylcine, 100 mg, oral, Daily  epoetin di or biosimilar, 10,000 Units, intravenous, Every Mon/Wed/Fri  escitalopram, 10 mg, oral, Nightly  fludrocortisone, 0.1 mg, oral, TID  gabapentin, 100 mg, oral, TID  heparin, 2,000 Units, intra-catheter, After Dialysis  heparin, 2,000 Units, intra-catheter, After Dialysis  heparin, 2,000 Units, intra-catheter, After Dialysis  heparin, 2,000 Units, intra-catheter, After Dialysis  insulin lispro, 0-10 Units, subcutaneous, TID with  meals  ipratropium-albuteroL, 3 mL, nebulization, q6h while awake  lidocaine, 5 mL, infiltration, Once  midodrine, 15 mg, oral, TID with meals  nystatin, , Topical, BID  nystatin, , Topical, BID  pantoprazole, 40 mg, oral, Daily   Or  pantoprazole, 40 mg, intravenous, Daily  perflutren lipid microspheres, 0.5-10 mL of dilution, intravenous, Once in imaging  perflutren protein A microsphere, 0.5 mL, intravenous, Once in imaging  psyllium, 1 packet, oral, BID  sulfur hexafluoride microsphr, 2 mL, intravenous, Once in imaging  sulfur hexafluoride microsphr, 2 mL, intravenous, Once in imaging  vancomycin, 125 mg, oral, BID  zinc oxide, 1 Application, Topical, BID       norepinephrine, 0.01-3 mcg/kg/min, Last Rate: Stopped (02/12/24 1700)       PRN medications: acetaminophen, albumin human, alteplase, ammonium lactate, benzocaine-menthoL, dextrose 10 % in water (D10W), dextrose, glucagon, ondansetron, oxygen, sennosides-docusate sodium, vancomycin     OBJECTIVE  PHYSICAL EXAM:   Heart Rate:  []   Temp:  [36.1 °C (97 °F)-36.8 °C (98.2 °F)]   Resp:  [15-37]   BP: (51-86)/(24-74)   Weight:  [91.7 kg (202 lb 2.6 oz)]   SpO2:  [91 %-96 %]   Body mass index is 36.98 kg/m².  Chronically ill-appearing elderly white woman  Pale skin  Right-sided hand swelling  Left-sided finger amputation  Internal jugular tunneled hemodialysis catheter in place  There is no significant pretibial edema on both lower extremities  Soft abdomen  No Amos  No obvious joint deformities  Moist mucosa  Hearing seems to be intact  Phonation intact  Rectal tube in place       DATA:   Labs:  Results for orders placed or performed during the hospital encounter of 01/19/24 (from the past 96 hour(s))   POCT GLUCOSE   Result Value Ref Range    POCT Glucose 158 (H) 74 - 99 mg/dL   POCT GLUCOSE   Result Value Ref Range    POCT Glucose 118 (H) 74 - 99 mg/dL   POCT GLUCOSE   Result Value Ref Range    POCT Glucose 190 (H) 74 - 99 mg/dL   Basic Metabolic Panel    Result Value Ref Range    Glucose 166 (H) 65 - 99 mg/dL    Sodium 136 133 - 145 mmol/L    Potassium 3.4 3.4 - 5.1 mmol/L    Chloride 102 97 - 107 mmol/L    Bicarbonate 24 24 - 31 mmol/L    Urea Nitrogen 9 8 - 25 mg/dL    Creatinine 1.60 0.40 - 1.60 mg/dL    eGFR 34 (L) >60 mL/min/1.73m*2    Calcium 8.4 (L) 8.5 - 10.4 mg/dL    Anion Gap 10 <=19 mmol/L   CBC   Result Value Ref Range    WBC 10.3 4.4 - 11.3 x10*3/uL    nRBC 0.0 0.0 - 0.0 /100 WBCs    RBC 2.64 (L) 4.00 - 5.20 x10*6/uL    Hemoglobin 8.2 (L) 12.0 - 16.0 g/dL    Hematocrit 26.5 (L) 36.0 - 46.0 %     80 - 100 fL    MCH 31.1 26.0 - 34.0 pg    MCHC 30.9 (L) 32.0 - 36.0 g/dL    RDW 22.8 (H) 11.5 - 14.5 %    Platelets 173 150 - 450 x10*3/uL   POCT GLUCOSE   Result Value Ref Range    POCT Glucose 187 (H) 74 - 99 mg/dL   POCT GLUCOSE   Result Value Ref Range    POCT Glucose 190 (H) 74 - 99 mg/dL   POCT GLUCOSE   Result Value Ref Range    POCT Glucose 133 (H) 74 - 99 mg/dL   POCT GLUCOSE   Result Value Ref Range    POCT Glucose 170 (H) 74 - 99 mg/dL   Hepatitis B core antibody, IgM   Result Value Ref Range    Hepatitis B Core AB; IgM Nonreactive Nonreactive   Hepatitis B core antibody, total   Result Value Ref Range    Hepatitis B Core AB- Total Nonreactive Nonreactive   Hepatitis B e antibody   Result Value Ref Range    Hep Be Antibody Negative Negative   Hepatitis B surface antibody   Result Value Ref Range    Hepatitis B Surface AB 4.4 <10.0 mIU/mL   Hepatitis B surface antigen   Result Value Ref Range    Hepatitis B Surface AG Nonreactive Nonreactive   CBC and Auto Differential   Result Value Ref Range    WBC 10.7 4.4 - 11.3 x10*3/uL    nRBC 0.0 0.0 - 0.0 /100 WBCs    RBC 2.50 (L) 4.00 - 5.20 x10*6/uL    Hemoglobin 7.8 (L) 12.0 - 16.0 g/dL    Hematocrit 25.3 (L) 36.0 - 46.0 %     (H) 80 - 100 fL    MCH 31.2 26.0 - 34.0 pg    MCHC 30.8 (L) 32.0 - 36.0 g/dL    RDW 22.3 (H) 11.5 - 14.5 %    Platelets 191 150 - 450 x10*3/uL    Neutrophils % 60.6  40.0 - 80.0 %    Immature Granulocytes %, Automated 0.8 0.0 - 0.9 %    Lymphocytes % 21.9 13.0 - 44.0 %    Monocytes % 11.2 2.0 - 10.0 %    Eosinophils % 4.7 0.0 - 6.0 %    Basophils % 0.8 0.0 - 2.0 %    Neutrophils Absolute 6.47 (H) 1.60 - 5.50 x10*3/uL    Immature Granulocytes Absolute, Automated 0.09 0.00 - 0.50 x10*3/uL    Lymphocytes Absolute 2.33 0.80 - 3.00 x10*3/uL    Monocytes Absolute 1.19 (H) 0.05 - 0.80 x10*3/uL    Eosinophils Absolute 0.50 (H) 0.00 - 0.40 x10*3/uL    Basophils Absolute 0.08 0.00 - 0.10 x10*3/uL   Comprehensive Metabolic Panel   Result Value Ref Range    Glucose 183 (H) 65 - 99 mg/dL    Sodium 130 (L) 133 - 145 mmol/L    Potassium 3.7 3.4 - 5.1 mmol/L    Chloride 100 97 - 107 mmol/L    Bicarbonate 20 (L) 24 - 31 mmol/L    Urea Nitrogen 17 8 - 25 mg/dL    Creatinine 2.40 (H) 0.40 - 1.60 mg/dL    eGFR 21 (L) >60 mL/min/1.73m*2    Calcium 8.3 (L) 8.5 - 10.4 mg/dL    Albumin 2.6 (L) 3.5 - 5.0 g/dL    Alkaline Phosphatase 140 (H) 35 - 125 U/L    Total Protein 5.6 (L) 5.9 - 7.9 g/dL    AST 14 5 - 40 U/L    Bilirubin, Total 0.6 0.1 - 1.2 mg/dL    ALT 6 5 - 40 U/L    Anion Gap 10 <=19 mmol/L   Magnesium   Result Value Ref Range    Magnesium 1.50 (L) 1.60 - 3.10 mg/dL   Phosphorus   Result Value Ref Range    Phosphorus 2.5 2.5 - 4.5 mg/dL   Morphology   Result Value Ref Range    RBC Morphology No significant RBC morphology present    POCT GLUCOSE   Result Value Ref Range    POCT Glucose 158 (H) 74 - 99 mg/dL   POCT GLUCOSE   Result Value Ref Range    POCT Glucose 163 (H) 74 - 99 mg/dL   POCT GLUCOSE   Result Value Ref Range    POCT Glucose 170 (H) 74 - 99 mg/dL   POCT GLUCOSE   Result Value Ref Range    POCT Glucose 212 (H) 74 - 99 mg/dL   Vancomycin   Result Value Ref Range    Vancomycin 13.9 10.0 - 20.0 ug/mL   Lavender Top   Result Value Ref Range    Extra Tube Hold for add-ons.    POCT GLUCOSE   Result Value Ref Range    POCT Glucose 163 (H) 74 - 99 mg/dL   POCT GLUCOSE   Result Value Ref  Range    POCT Glucose 229 (H) 74 - 99 mg/dL   POCT GLUCOSE   Result Value Ref Range    POCT Glucose 133 (H) 74 - 99 mg/dL   POCT GLUCOSE   Result Value Ref Range    POCT Glucose 150 (H) 74 - 99 mg/dL   Basic metabolic panel   Result Value Ref Range    Glucose 219 (H) 65 - 99 mg/dL    Sodium 132 (L) 133 - 145 mmol/L    Potassium 3.4 3.4 - 5.1 mmol/L    Chloride 99 97 - 107 mmol/L    Bicarbonate 19 (L) 24 - 31 mmol/L    Urea Nitrogen 21 8 - 25 mg/dL    Creatinine 2.50 (H) 0.40 - 1.60 mg/dL    eGFR 20 (L) >60 mL/min/1.73m*2    Calcium 8.3 (L) 8.5 - 10.4 mg/dL    Anion Gap 14 <=19 mmol/L   CBC and Auto Differential   Result Value Ref Range    WBC 12.6 (H) 4.4 - 11.3 x10*3/uL    nRBC 0.0 0.0 - 0.0 /100 WBCs    RBC 2.59 (L) 4.00 - 5.20 x10*6/uL    Hemoglobin 8.0 (L) 12.0 - 16.0 g/dL    Hematocrit 26.1 (L) 36.0 - 46.0 %     (H) 80 - 100 fL    MCH 30.9 26.0 - 34.0 pg    MCHC 30.7 (L) 32.0 - 36.0 g/dL    RDW 21.3 (H) 11.5 - 14.5 %    Platelets 192 150 - 450 x10*3/uL    Neutrophils % 55.3 40.0 - 80.0 %    Immature Granulocytes %, Automated 1.0 (H) 0.0 - 0.9 %    Lymphocytes % 26.1 13.0 - 44.0 %    Monocytes % 9.5 2.0 - 10.0 %    Eosinophils % 7.1 0.0 - 6.0 %    Basophils % 1.0 0.0 - 2.0 %    Neutrophils Absolute 6.98 (H) 1.60 - 5.50 x10*3/uL    Immature Granulocytes Absolute, Automated 0.12 0.00 - 0.50 x10*3/uL    Lymphocytes Absolute 3.28 (H) 0.80 - 3.00 x10*3/uL    Monocytes Absolute 1.19 (H) 0.05 - 0.80 x10*3/uL    Eosinophils Absolute 0.89 (H) 0.00 - 0.40 x10*3/uL    Basophils Absolute 0.12 (H) 0.00 - 0.10 x10*3/uL   Morphology   Result Value Ref Range    RBC Morphology See Below     Polychromasia Mild     Spherocytes Few     Target Cells Few          SIGNATURE: Saeed Mondragon MD PATIENT NAME: Ayanna Gross   DATE: February 15, 2024 MRN: [id number]   TIME: 11:18 AM PAGER: [phone]

## 2024-02-15 NOTE — CARE PLAN
Problem: Pain  Goal: My pain/discomfort is manageable  Outcome: Progressing     Problem: Safety  Goal: Patient will be injury free during hospitalization  Outcome: Progressing  Goal: I will remain free of falls  Outcome: Progressing     Problem: Daily Care  Goal: Daily care needs are met  Outcome: Progressing     Problem: Psychosocial Needs  Goal: Demonstrates ability to cope with hospitalization/illness  Outcome: Progressing  Goal: Collaborate with me, my family, and caregiver to identify my specific goals  Outcome: Progressing     Problem: Discharge Barriers  Goal: My discharge needs are met  Outcome: Progressing     Problem: Fall/Injury  Goal: Not fall by end of shift  Outcome: Progressing  Goal: Be free from injury by end of the shift  Outcome: Progressing  Goal: Verbalize understanding of personal risk factors for fall in the hospital  Outcome: Progressing  Goal: Verbalize understanding of risk factor reduction measures to prevent injury from fall in the home  Outcome: Progressing  Goal: Pace activities to prevent fatigue by end of the shift  Outcome: Progressing     Problem: Skin  Goal: Decreased wound size/increased tissue granulation at next dressing change  Outcome: Progressing  Flowsheets (Taken 2/15/2024 0044)  Decreased wound size/increased tissue granulation at next dressing change:   Promote sleep for wound healing   Utilize specialty bed per algorithm   Protective dressings over bony prominences  Goal: Participates in plan/prevention/treatment measures  Outcome: Progressing  Flowsheets (Taken 2/15/2024 0044)  Participates in plan/prevention/treatment measures:   Discuss with provider PT/OT consult   Elevate heels   Increase activity/out of bed for meals  Goal: Prevent/manage excess moisture  Outcome: Progressing  Flowsheets (Taken 2/15/2024 0044)  Prevent/manage excess moisture:   Cleanse incontinence/protect with barrier cream   Moisturize dry skin   Follow provider orders for dressing changes    Monitor for/manage infection if present  Goal: Prevent/minimize sheer/friction injuries  Outcome: Progressing  Flowsheets (Taken 2/15/2024 0044)  Prevent/minimize sheer/friction injuries:   Increase activity/out of bed for meals   Use pull sheet   Complete micro-shifts as needed if patient unable. Adjust patient position to relieve pressure points, not a full turn   Turn/reposition every 2 hours/use positioning/transfer devices   Utilize specialty bed per algorithm   HOB 30 degrees or less  Goal: Promote/optimize nutrition  Outcome: Progressing  Flowsheets (Taken 2/15/2024 0044)  Promote/optimize nutrition:   Assist with feeding   Monitor/record intake including meals   Offer water/supplements/favorite foods   Consume > 50% meals/supplements   Discuss with provider if NPO > 2 days   Reassess MST if dietician not consulted  Goal: Promote skin healing  Outcome: Progressing  Flowsheets (Taken 2/15/2024 0044)  Promote skin healing:   Assess skin/pad under line(s)/device(s)   Protective dressings over bony prominences   Turn/reposition every 2 hours/use positioning/transfer devices   Rotate device position/do not position patient on device   Ensure correct size (line/device) and apply per  instructions     Problem: Pain  Goal: Takes deep breaths with improved pain control throughout the shift  Outcome: Progressing  Goal: Turns in bed with improved pain control throughout the shift  Outcome: Progressing  Goal: Walks with improved pain control throughout the shift  Outcome: Progressing  Goal: Performs ADL's with improved pain control throughout shift  Outcome: Progressing     Problem: Respiratory  Goal: Clear secretions with interventions this shift  Outcome: Progressing  Goal: Minimize anxiety/maximize coping throughout shift  Outcome: Progressing  Goal: Minimal/no exertional discomfort or dyspnea this shift  Outcome: Progressing  Goal: Patent airway maintained this shift  Outcome: Progressing  Goal: Tolerate  pulmonary toileting this shift  Outcome: Progressing  Goal: Verbalize decreased shortness of breath this shift  Outcome: Progressing  Goal: Increase self care and/or family involvement in next 24 hours  Outcome: Progressing     Problem: Diabetes  Goal: Maintain electrolyte levels within acceptable range throughout shift  Outcome: Progressing  Goal: Maintain glucose levels >70mg/dl to <250mg/dl throughout shift  Outcome: Progressing  Goal: No changes in neurological exam by end of shift  Outcome: Progressing  Goal: Learn about and adhere to nutrition recommendations by end of shift  Outcome: Progressing  Goal: Vital signs within normal range for age by end of shift  Outcome: Progressing  Goal: Increase self care and/or family involovement by end of shift  Outcome: Progressing  Goal: Receive DSME education by end of shift  Outcome: Progressing     Problem: Discharge Planning  Goal: Discharge to home or other facility with appropriate resources  Outcome: Progressing     Problem: Chronic Conditions and Co-morbidities  Goal: Patient's chronic conditions and co-morbidity symptoms are monitored and maintained or improved  Outcome: Progressing   The patient's goals for the shift include Visit with family.    The clinical goals for the shift include pt will tolerate lack of pressors through shift

## 2024-02-15 NOTE — PROGRESS NOTES
Ayanna Gross is a 73 y.o. female on day 27 of admission presenting with Septic shock (CMS/[facility]).    Critical Care Medicine Progress Note    Admitted on:     1/19/2024  Length of Stay: 27 day(s)     Interval History     She is off Levophed since last night  awake today following commands no issues overnight.    Objective   Objective     Vitals:    02/14/24 1215   Weight: 91.7 kg (202 lb 2.6 oz)   Body mass index is 36.98 kg/m².        2/15/2024    12:00 AM 2/15/2024     1:00 AM 2/15/2024     2:02 AM 2/15/2024     3:00 AM 2/15/2024     4:00 AM 2/15/2024     5:00 AM 2/15/2024     6:00 AM   Vitals   Systolic 61 65 72 65 58 59 62   Diastolic 31 34 40 34 30 33 50   Heart Rate 95 97 95 95 92 91 94   Temp 36.5 °C (97.7 °F)    36.1 °C (97 °F)     Resp 22 21 18 17 16 15 17        Vent settings:       Intake/Output Summary (Last 24 hours) at 2/15/2024 1133  Last data filed at 2/15/2024 0600  Gross per 24 hour   Intake 580 ml   Output 2300 ml   Net -1720 ml     Physical exam:  -----------------  Awake and oriented to place and person  Cardiovascular:      Rate and Rhythm: Normal rate. Rhythm irregular.   Pulmonary:      Effort: Pulmonary effort is normal.   Abdominal:      General: Abdomen is flat.   Musculoskeletal:         General: Deformity present. Normal range of motion.      Cervical back: Normal range of motion.      Comments: Abscence of left hand first finger   Skin:     General: Skin is warm.      Capillary Refill: Capillary refill takes less than 2 seconds.      Comments: Skin tear healing to L heal  Wound to bilateral feet and sacrum     Medications     Scheduled Medications:   apixaban, 2.5 mg, oral, BID  cefepime, 1 g, intravenous, q24h  doxycylcine, 100 mg, oral, Daily  epoetin di or biosimilar, 10,000 Units, intravenous, Every Mon/Wed/Fri  escitalopram, 10 mg, oral, Nightly  fludrocortisone, 0.1 mg, oral, TID  gabapentin, 100 mg, oral, TID  heparin, 2,000 Units, intra-catheter, After Dialysis  heparin, 2,000  Units, intra-catheter, After Dialysis  heparin, 2,000 Units, intra-catheter, After Dialysis  heparin, 2,000 Units, intra-catheter, After Dialysis  insulin lispro, 0-10 Units, subcutaneous, TID with meals  ipratropium-albuteroL, 3 mL, nebulization, q6h while awake  lidocaine, 5 mL, infiltration, Once  midodrine, 15 mg, oral, TID with meals  nystatin, , Topical, BID  nystatin, , Topical, BID  pantoprazole, 40 mg, oral, Daily   Or  pantoprazole, 40 mg, intravenous, Daily  perflutren lipid microspheres, 0.5-10 mL of dilution, intravenous, Once in imaging  perflutren protein A microsphere, 0.5 mL, intravenous, Once in imaging  psyllium, 1 packet, oral, BID  sulfur hexafluoride microsphr, 2 mL, intravenous, Once in imaging  sulfur hexafluoride microsphr, 2 mL, intravenous, Once in imaging  vancomycin, 125 mg, oral, BID  zinc oxide, 1 Application, Topical, BID       Continuous Medications:   norepinephrine, 0.01-3 mcg/kg/min, Last Rate: Stopped (02/12/24 1700)       PRN Medications:     Labs     Results from last 72 hours   Lab Units 02/15/24  0933 02/13/24  0440   GLUCOSE mg/dL 219* 183*   SODIUM mmol/L 132* 130*   POTASSIUM mmol/L 3.4 3.7   CHLORIDE mmol/L 99 100   CO2 mmol/L 19* 20*   BUN mg/dL 21 17   CREATININE mg/dL 2.50* 2.40*   EGFR mL/min/1.73m*2 20* 21*   CALCIUM mg/dL 8.3* 8.3*   ALBUMIN g/dL  --  2.6*   MAGNESIUM mg/dL  --  1.50*   PHOSPHORUS mg/dL  --  2.5     Results from last 72 hours   Lab Units 02/13/24  0440   ALK PHOS U/L 140*   ALT U/L 6   AST U/L 14   BILIRUBIN TOTAL mg/dL 0.6   PROTEIN TOTAL g/dL 5.6*               Results from last 72 hours   Lab Units 02/15/24  0933 02/13/24  0440   WBC AUTO x10*3/uL 12.6* 10.7   NRBC AUTO /100 WBCs 0.0 0.0   RBC AUTO x10*6/uL 2.59* 2.50*   HEMOGLOBIN g/dL 8.0* 7.8*   HEMATOCRIT % 26.1* 25.3*   MCV fL 101* 101*   MCH pg 30.9 31.2   MCHC g/dL 30.7* 30.8*   RDW % 21.3* 22.3*   PLATELETS AUTO x10*3/uL 192 191         Lab Results   Component Value Date    BLOODCULT No  growth at 4 days -  FINAL REPORT 01/19/2024    BLOODCULT No growth at 4 days -  FINAL REPORT 01/19/2024    GRAMSTAIN (4+) Abundant Polymorphonuclear leukocytes (A) 02/10/2024    GRAMSTAIN (3+) Moderate Gram negative bacilli (A) 02/10/2024     Lab Results   Component Value Date    URINECULTURE >100,000 Proteus mirabilis (A) 01/19/2024       Imaging and Diagnostic Studies     Lab/Radiology/Diagnostic Review:  Results for orders placed or performed during the hospital encounter of 01/19/24 (from the past 24 hour(s))   POCT GLUCOSE   Result Value Ref Range    POCT Glucose 229 (H) 74 - 99 mg/dL   POCT GLUCOSE   Result Value Ref Range    POCT Glucose 133 (H) 74 - 99 mg/dL   POCT GLUCOSE   Result Value Ref Range    POCT Glucose 150 (H) 74 - 99 mg/dL   Basic metabolic panel   Result Value Ref Range    Glucose 219 (H) 65 - 99 mg/dL    Sodium 132 (L) 133 - 145 mmol/L    Potassium 3.4 3.4 - 5.1 mmol/L    Chloride 99 97 - 107 mmol/L    Bicarbonate 19 (L) 24 - 31 mmol/L    Urea Nitrogen 21 8 - 25 mg/dL    Creatinine 2.50 (H) 0.40 - 1.60 mg/dL    eGFR 20 (L) >60 mL/min/1.73m*2    Calcium 8.3 (L) 8.5 - 10.4 mg/dL    Anion Gap 14 <=19 mmol/L   CBC and Auto Differential   Result Value Ref Range    WBC 12.6 (H) 4.4 - 11.3 x10*3/uL    nRBC 0.0 0.0 - 0.0 /100 WBCs    RBC 2.59 (L) 4.00 - 5.20 x10*6/uL    Hemoglobin 8.0 (L) 12.0 - 16.0 g/dL    Hematocrit 26.1 (L) 36.0 - 46.0 %     (H) 80 - 100 fL    MCH 30.9 26.0 - 34.0 pg    MCHC 30.7 (L) 32.0 - 36.0 g/dL    RDW 21.3 (H) 11.5 - 14.5 %    Platelets 192 150 - 450 x10*3/uL    Neutrophils % 55.3 40.0 - 80.0 %    Immature Granulocytes %, Automated 1.0 (H) 0.0 - 0.9 %    Lymphocytes % 26.1 13.0 - 44.0 %    Monocytes % 9.5 2.0 - 10.0 %    Eosinophils % 7.1 0.0 - 6.0 %    Basophils % 1.0 0.0 - 2.0 %    Neutrophils Absolute 6.98 (H) 1.60 - 5.50 x10*3/uL    Immature Granulocytes Absolute, Automated 0.12 0.00 - 0.50 x10*3/uL    Lymphocytes Absolute 3.28 (H) 0.80 - 3.00 x10*3/uL    Monocytes  Absolute 1.19 (H) 0.05 - 0.80 x10*3/uL    Eosinophils Absolute 0.89 (H) 0.00 - 0.40 x10*3/uL    Basophils Absolute 0.12 (H) 0.00 - 0.10 x10*3/uL   Morphology   Result Value Ref Range    RBC Morphology See Below     Polychromasia Mild     Spherocytes Few     Target Cells Few      Upper extremity venous duplex bilateral    Result Date: 1/25/2024           [place] [address]            Phone [phone]  Vascular Lab Report  [facility] US UPPER EXTREMITY VENOUS DUPLEX BILATERAL Patient Name:      BASIA FREIDA VEGA      Reading Physician:  [id number] Mini Busby MD, RPVI Study Date:        1/25/2024           Ordering Provider:  [id number] ERI BERMAN MRN/PID:           [id number]            Fellow: Accession#:        [id number]        Technologist:       Dara Parr RVT Date of Birth/Age: 1950 / 73 years Technologist 2: Gender:            F                   Encounter#:         [id number] Admission Status:  Inpatient           Location Performed: [facility]  Diagnosis/ICD: Right arm swelling-M79.89; Left arm swelling-M79.89 CPT Codes:     24533 Peripheral venous duplex scan for DVT complete  CONCLUSIONS:  Right Upper Venous: No evidence of acute deep vein thrombus visualized in the right upper extremity. Internal jugular vein was visualized in segments due to IV lines and bandages. Left Upper Venous: No evidence of acute deep vein thrombus visualized in the left upper extremity. Subclavian stent is noted and appears patent. There is a known occluded dialysis access noted.  Additional Findings: Technically difficult exam due to IV lines, bandages, and patient's positioning.  Imaging & Doppler Findings:  Right               Compressible Thrombus        Flow Internal Jugular        Yes        None   Spontaneous/Phasic Subclavian              Yes        None Subclavian Proximal     Yes        None    Spontaneous/Phasic Subclavian Mid          Yes        None Subclavian Distal       Yes        None   Spontaneous/Phasic Axillary                Yes        None       Pulsatile Brachial                Yes        None Cephalic                Yes        None Basilic                 Yes        None  Left                Compress Thrombus        Flow Internal Jugular      Yes      None       Pulsatile Subclavian            Yes      None Subclavian Proximal   Yes      None       Pulsatile Subclavian Mid        Yes      None       Pulsatile Subclavian Distal     Yes      None       Pulsatile Axillary              Yes      None   Spontaneous/Phasic Brachial              Yes      None Cephalic              Yes      None Basilic               Yes      None  [id number] Mini Busby MD, ALIEC Electronically signed by [id number] ALICE Kelly MD on 1/25/2024 at 4:06:13 PM  ** Final **     ECG 12 lead    Result Date: 1/24/2024  Sinus tachycardia  with 1st degree AV block Right bundle branch block Possible Lateral infarct , age undetermined Abnormal ECG Confirmed by Yesika Nj ([id number]) on 1/24/2024 6:54:45 AM    XR tibia fibula right 2 views    Result Date: 1/22/2024  Interpreted By:  Real Mendieta, STUDY: XR TIBIA FIBULA RIGHT 2 VIEWS; 1/22/2024 2:15 pm   INDICATION: Signs/Symptoms:evaluate source of infection, osteomyelitis;   COMPARISON: None available.   ACCESSION NUMBER(S): [id number]   ORDERING CLINICIAN: BAN GIFFORD   TECHNIQUE: Views: AP and Lateral of the right tibia and fibula   FINDINGS: RESULT: There is no evidence for fracture or dislocation. Tricompartmental degenerative changes of the right knee. The tibia and fibula appear intact without bony erosion or evidence for osteomyelitis. No other bony or soft tissue abnormality is identified. No subcutaneous gas is noted.       No evidence for acute osseous abnormality. No bony erosion to suggest osteomyelitis.   Signed by: Real Mendieta 1/22/2024 3:15 PM Dictation  workstation:   [id number]    XR ankle right 3+ views    Result Date: 1/22/2024  Interpreted By:  Real Mendieta, STUDY: XR ANKLE RIGHT 3+ VIEWS; 1/22/2024 2:15 pm   INDICATION: Signs/Symptoms:Evuluate source of infection, osteomyelitis;   COMPARISON: None available.   ACCESSION NUMBER(S): [id number]   ORDERING CLINICIAN: BAN GIFFORD   TECHNIQUE: Views: AP, Lateral, Oblique, right ankle   FINDINGS: RESULT: There is no evidence for fracture or dislocation. The ankle mortise is intact. Joint spaces appear adequately maintained. No bony erosion to suggest osteomyelitis. No bone lesion or soft tissue abnormality is identified. No subcutaneous gas is seen.       No evidence for acute osseous abnormality. No bony erosion to suggest osteomyelitis.   Signed by: Real Mendieta 1/22/2024 3:14 PM Dictation workstation:   [id number]    Transthoracic Echo (TTE) Complete    Result Date: 1/22/2024           [place] [address]            Phone [phone] TRANSTHORACIC ECHOCARDIOGRAM REPORT  Patient Name:      BASIA Gray Physician:   [id number] [facility] Study Date:        1/22/2024           Ordering Provider:   [id number] ERI BERMAN MRN/PID:           [id number]            Fellow: Accession#:        [id number]        Nurse: Date of Birth/Age: 1950 / 73 years Sonographer:         Tabatha Page RDCS Gender:            F                   Additional Staff: Height:            157.00 cm           Admit Date: Weight:            75.00 kg            Admission Status:    Inpatient - Routine BSA:               1.76 m2             Department Location: [facility] Blood Pressure: 88 /49 mmHg Study Type:    TRANSTHORACIC ECHO (TTE) COMPLETE Diagnosis/ICD: Chronic combined systolic (congestive) and diastolic (congestive)                heart failure (CHF)-I50.42; Sepsis, unspecified organism-A41.9  Indication:    heart failure,septic shock CPT Codes:     Echo Complete w Full Doppler-17600 Patient History: BMI:               Obese >30 Pertinent History: Sepsis, PVD, A-Fib, CVA, Cancer and HTN. breast                    prosthesis/ca, ESRD,anemia,septic shock,heart failure. Study Detail: The following Echo studies were performed: 2D, M-Mode, Doppler and               color flow. Technically challenging study due to prosthesis,               prominent lung artifact and body habitus.  PHYSICIAN INTERPRETATION: Left Ventricle: Left ventricular systolic function is normal, with an estimated ejection fraction of 70%. There are no regional wall motion abnormalities. The left ventricular cavity size is normal. Left ventricular diastolic filling was indeterminate. Left Atrium: The left atrium is moderately dilated. Right Ventricle: The right ventricle is normal in size. There is normal right ventricular global systolic function. Right Atrium: The right atrium is normal in size. Aortic Valve: The aortic valve is trileaflet. There is no evidence of aortic valve regurgitation. The peak instantaneous gradient of the aortic valve is 2.8 mmHg. Mitral Valve: The mitral valve is normal in structure. There is no evidence of mitral valve regurgitation. Tricuspid Valve: The tricuspid valve is structurally normal. There is trace tricuspid regurgitation. Pulmonic Valve: The pulmonic valve is not well visualized. The pulmonic valve regurgitation was not well visualized. Pericardium: There is no pericardial effusion noted. Aorta: The aortic root is normal.  CONCLUSIONS:  1. Left ventricular systolic function is normal with a 70% estimated ejection fraction.  2. The left atrium is moderately dilated.  3. Left ventricular diastolic filling indeterminate. QUANTITATIVE DATA SUMMARY: 2D MEASUREMENTS:                          Normal Ranges: LAs:           4.50 cm   (2.7-4.0cm) IVSd:          0.61 cm   (0.6-1.1cm) LVPWd:         0.88 cm    (0.6-1.1cm) LVIDd:         3.66 cm   (3.9-5.9cm) LV Mass Index: 41.9 g/m2 LV SYSTOLIC FUNCTION BY 2D PLANIMETRY (MOD):                     Normal Ranges: EF-A4C View: 68.3 % (>=55%) LV DIASTOLIC FUNCTION:                     Normal Ranges: MV Peak E: 1.19 m/s (0.7-1.2 m/s) MV Peak A: 0.90 m/s (0.42-0.7 m/s) E/A Ratio: 1.32     (1.0-2.2) MITRAL VALVE:                 Normal Ranges: MV DT: 192 msec (150-240msec) AORTIC VALVE:                         Normal Ranges: AoV Vmax:      0.84 m/s (<=1.7m/s) AoV Peak P.8 mmHg (<20mmHg) LVOT Max Shimon:  0.47 m/s (<=1.1m/s) LVOT Diameter: 1.90 cm  (1.8-2.4cm) AoV Area,Vmax: 1.57 cm2 (2.5-4.5cm2) TRICUSPID VALVE/RVSP:                   Normal Ranges: IVC Diam: 1.05 cm  [id number] [facility]  Electronically signed on 2024 at 2:53:04 PM  ** Final **     XR foot left 3+ views    Result Date: 2024  Interpreted By:  Real Mendieta, STUDY: XR FOOT LEFT 1-2 VIEWS; 2024 4:15 pm   INDICATION: Signs/Symptoms:osteomyelitis. Pain   COMPARISON: 2023   ACCESSION NUMBER(S): [id number]   ORDERING CLINICIAN: BAN GIFFORD   TECHNIQUE: Views:  AP, Lat, Oblique, left foot   FINDINGS: RESULT: There is no evidence for fracture or dislocation. 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 Mild hammertoe deformities. Joint spaces appear adequately maintained. Soft tissue ulceration of the posterior aspect of the heel however no evidence for bony erosion to suggest osteomyelitis.       No evidence for acute fracture or acute bony erosion to suggest osteomyelitis. Chronic changes as described.   Signed by: Real Mendieta 2024 7:15 PM Dictation workstation:   [id number]    CT chest abdomen pelvis wo IV contrast    Result Date: 2024  Interpreted By:  Maria Garcia, STUDY: CT CHEST ABDOMEN PELVIS WO CONTRAST;  2024 11:42 pm   INDICATION: Mental status change. Elevated white blood cell count with infectious workup.   COMPARISON: 2023    ACCESSION NUMBER(S): [id number]   ORDERING CLINICIAN: BAN GIFFORD   TECHNIQUE: CT of the chest, abdomen and pelvis was performed with no oral or intravenous contrast administered. Sagittal and coronal reformations were completed by the technologist at the acquisition scanner.   All CT examinations are performed with 1 or more of the following dose reduction techniques: Automated exposure control, adjustment of mA and/or kv according to patient's size, or use of iterative reconstruction techniques.   FINDINGS: Please note that the study is limited without intravenous contrast.   CHEST: Bilateral pleural effusions are present with right effusion moderately small in size and with the left effusion moderately small in size as well. There is shift of the heart and mediastinum to the left of midline due to atelectasis of the left upper lobe. There is consolidation throughout left lower lobe with air bronchograms noted. On the right, there is compressive atelectasis seen posteriorly in the right lower lobe.   There is mild reactive mediastinal adenopathy seen at this time with mediastinal lymph nodes increased in size since prior study. Several of these mediastinal nodes are at the upper limits of normal measuring 8 mm in short axis diameter.   No pericardial effusion is present. The cardiac size is normal with mitral annulus calcification.   There has been prior bilateral mastectomy with reconstruction of the left breast with implant placement. Surgical clips are visible within the left axilla. Stent grafts are visible within the left upper extremity and within the left innominate, subclavian as well as axillary veins.   A peripherally calcified 1.8 cm nodule arises from the lower pole of left thyroid lobe.   ABDOMEN:   LIVER: Unremarkable.   BILE DUCTS: No intrahepatic or extrahepatic biliary ductal dilatation is seen.   GALLBLADDER: Cholelithiasis is observed with some calcification of the gallbladder wall  demonstrated as well. No gallbladder wall thickening or pericholecystic fluid is evident.   PANCREAS: Unremarkable   SPLEEN: Unremarkable   ADRENAL GLANDS: Unremarkable   KIDNEYS AND URETERS: Kidneys are small in size with extensive renal artery calcification demonstrated.   PELVIS:   BLADDER: Amos catheter is present within the decompressed urinary bladder.   REPRODUCTIVE ORGANS: Calcification of the arcuate arteries within the uterus is seen. There is no uterine enlargement or adnexal mass.   BOWEL: A rectal balloon is seen. There is no abnormal distention of the intestinal tract.   VESSELS: There is atherosclerosis of the thoracoabdominal aorta and iliac arteries with calcification in the walls of the celiac, splenic, hepatic, and mesenteric arteries.   PERITONEUM/RETROPERITONEUM/LYMPH NODES: There is a minimal amount of ascites seen about the liver and spleen with mild presacral edema noted.   ABDOMINAL WALL: There is anasarca involving the soft tissues of the abdomen and pelvis. Postoperative change from ventral hernia repair is seen.   BONES: Bilateral sacroiliitis is seen. There is lumbar dextroscoliosis. Chronic rotator cuff tear is present bilaterally with osteoarthritis of both shoulders, right greater than left.       Chest 1.  Moderately small bilateral pleural effusions with left upper lobe atelectasis and compressive atelectasis seen posteriorly in right lower lobe. A left lower lobe pneumonia is identified. There is extensive airspace consolidation within left lower lobe with air bronchograms observed. Mild mediastinal reactive adenopathy is present as well.   Abdomen-Pelvis 1.  Cholelithiasis without evidence of cholecystitis. 2. Small amount of ascites with mild presacral edema and anasarca within the soft tissues of the abdominal wall. 3. Renal atrophy with extensive vascular calcifications.     MACRO: None   Signed by: Maria Garcia 1/21/2024 8:30 AM Dictation workstation:   [id number]    CT head  wo IV contrast    Result Date: 1/21/2024  Interpreted By:  Maria Garcia, STUDY: CT HEAD WO IV CONTRAST 1/20/2024 11:42 pm   INDICATION: Signs/Symptoms:mental status changes   COMPARISON: 12/11/2023   ACCESSION NUMBER(S): [id number]   ORDERING CLINICIAN: BAN GIFFORD   TECHNIQUE: Unenhanced axial images of the brain are completed.   All CT examinations are performed with 1 or more of the following dose reduction techniques: Automated exposure control, adjustment of mA and/or kv according to patient's size, or use of iterative reconstruction techniques.   FINDINGS: Helical unenhanced axial images of the brain demonstrate a mild to moderate degree of ventricular enlargement with proportionate widening of the sulci and sylvian fissures. There is no midline shift, mass effect, extra-axial fluid collection, or acute intracranial hemorrhage. There is diminished density seen in the periventricular white matter indicating chronic microvascular ischemic disease. There is calcified plaque seen within the distal vertebral and internal carotid arteries bilaterally. No calvarial abnormality is seen.       Atrophy and chronic microvascular ischemic disease without acute intracranial process.   Signed by: Maria Garcia 1/21/2024 8:09 AM Dictation workstation:   [id number]    XR chest 1 view    Result Date: 1/20/2024  Interpreted By:  Brendon Perez, STUDY: XR CHEST 1 VIEW; 1/20/2024 5:03 pm   INDICATION: CLINICAL INFORMATION: Signs/Symptoms:Insertion right IJ central line, also left thoracentesis.   COMPARISON: 01/19/2024 at 1338 hours   ACCESSION NUMBER(S): [id number]   ORDERING CLINICIAN: BOZENA ANTONIO   TECHNIQUE: Portable chest one view.   FINDINGS: The cardiac size is indeterminate in view of the AP projection. There is no change in the right-sided dual port central venous catheter. There is a new right internal jugular venous catheter present with the tip at approximately same level as the dual port central venous catheter,  overlying the mid to lower right atrium. There is no evidence for pneumothorax. Patient has a history of left thoracentesis without evidence for pneumothorax on the left. There is bilateral basilar alveolar infiltrate and effusions. Left effusion is decreased compared to the study from 1 day earlier.   Surgical clips are identified within left chest wall. Endovascular stent is identified in the distribution of the left subclavian artery.       1. Status post right-sided central venous catheter placement as described above with the tip overlying the mid to caudal aspect of the right atrium. There is no evidence for pneumothorax. 2. No evidence for left-sided pneumothorax after left-sided thoracentesis. Decrease in the left effusion. 3. Bilateral basilar infiltrates and effusions are present. Follow-up to assure complete clearing is suggested.   MACRO: none   Signed by: Brendon Perez 1/20/2024 5:11 PM Dictation workstation:   [id number]    XR chest 1 view    Result Date: 1/19/2024  Interpreted By:  Real Mendieta, STUDY: XR CHEST 1 VIEW; 1/19/2024 1:38 pm   INDICATION: Signs/Symptoms:dyspnea.   COMPARISON: 12/26/2023   ACCESSION NUMBER(S): [id number]   ORDERING CLINICIAN: EMELY GOLDSMITH   TECHNIQUE: 1 view of the chest was performed.   FINDINGS: There may be a minimal right pleural effusion. Slight prominence of the interstitium otherwise the right lung is clear. Improved appearance of the left lung with improved aeration of the right upper lobe. There is opacification of the left lower lobe possibly due to large pleural effusion which is similar to the prior study. No pneumothorax. Right-sided dual lumen central line catheter with tip at the proximal atrium. The cardiomediastinal silhouette is within normal limits. Left-sided subclavian stents are again noted.       Improved aeration and appearance of the left lung superiorly however there is a persistent large left pleural effusion and opacification of the left  lower lobe.   Signed by: Real Mendieta 1/19/2024 1:44 PM Dictation workstation:   [id number]         Assessment / Plan       PROBLEM LIST:  - End stage heart failure with preserved EF - on vasopressors  - End stage renal disease on HD  - Cardiogenic shock - will continue to wean down levo her blood pressure measured 1 does not reflect of the actual blood pressure as the patient is fully awake despite readings of systolic in the 40s and 50s arterial line was also inaccurate due to vasculopathy  - Acute metabolic encephalopathy  - Failure to wean from vasopressors  - Goals of care discussion    MANAGEMENT PLAN:  - Start weaning levo and assess mental status  - received HD today   -Already on Fluorinef tid  - Palliative care consult  - Cardiology consultation - patient's daughter declined a RHC  - Peer to peer done with insurance, NOT approved  - Family will update us with goals of care  - Cont Doxy and Vanc PO. Meropenem pseudomonas in swab culture from right heal   Appreciate ID consult.  - Routine ICU care  - PT/OT  Overall prognosis poor due to chronic medical problems including end-stage renal disease heart failure patient is bedridden with decubitus ulcers  I have personally interviewed and examined the patient.  I have personally verified elements of the exam listed above. Interval changes or irregularities are as noted.  I have personally and independently reviewed laboratory, radiographic and procedural data.  I have personally reviewed the problem list above and concur. Changes, if any, are noted.  I have personally reviewed the plan list above and concur. Changes, if any, are noted.    The patient has high probability of compromise including but not limited to organ system failure, mechanical respiratory and circulatory support, cardiac arrest and death. I have discussed this in detail with the family / caregivers.    No change in plan overall    Tarek Gharibeh MD

## 2024-02-15 NOTE — CARE PLAN
The patient's goals for the shift include Visit with family.    The clinical goals for the shift include pt will tolerate lack of pressors through shift

## 2024-02-15 NOTE — PROGRESS NOTES
Ayanna Gross is a 73 y.o. female on day 27 of admission presenting with Septic shock (CMS/HCC).    Subjective   Interval History:    Afebrile, no chills  Awake, alert  Remains off pressors  On low-flow oxygen  Review of Systems   All other systems reviewed and are negative.      Objective   Range of Vitals (last 24 hours)  Heart Rate:  []   Temp:  [36.1 °C (97 °F)-36.8 °C (98.2 °F)]   Resp:  [15-37]   BP: (51-86)/(24-74)   Weight:  [91.7 kg (202 lb 2.6 oz)]   SpO2:  [91 %-96 %]   Daily Weight  02/14/24 : 91.7 kg (202 lb 2.6 oz)    Body mass index is 36.98 kg/m².    Physical Exam  Constitutional:       Appearance: Awake, alert  HENT:      Head: Normocephalic and atraumatic.      Nose: Nose normal.   Eyes:      Extraocular Movements: Extraocular movements intact.      Conjunctiva/sclera: Conjunctivae normal.   Cardiovascular:      Heart sounds: Normal heart sounds, S1 normal and S2 normal.   Pulmonary:      Breath sounds: Decreased breath sounds present.   Abdominal:      General: Bowel sounds are normal.      Palpations: Abdomen is soft.   Musculoskeletal:      Cervical back: Normal range of motion and neck supple.   Skin:     Comments: Stage III sacral ulcer, bilateral posterior heel eschar -photos examined  Neurological:      Mental Status: She is awake, alert    Antibiotics  aspirin tablet 325 mg  acetaminophen (Tylenol) tablet 650 mg  cefepime (Maxipime) 1 g in dextrose 5 % 50 mL IV  sodium chloride 0.9 % bolus 2,229 mL  apixaban (Eliquis) tablet 2.5 mg  escitalopram (Lexapro) tablet 10 mg  febuxostat (Uloric) tablet 40 mg  fenofibrate (Triglide) tablet 160 mg  gabapentin (Neurontin) capsule 100 mg  midodrine (Proamatine) tablet 15 mg  nystatin (Mycostatin) 100,000 unit/gram powder  oxyCODONE-acetaminophen (Percocet) 5-325 mg per tablet 1 tablet  simvastatin (Zocor) tablet 20 mg  piperacillin-tazobactam-dextrose (Zosyn) IV 2.25 g  vancomycin (Vancocin) capsule 125 mg  oxygen (O2) therapy  dextrose 50 %  injection 25 g  glucagon (Glucagen) injection 1 mg  dextrose 10 % in water (D10W) infusion  insulin lispro (HumaLOG) injection 0-10 Units  nystatin (Mycostatin) 100,000 unit/gram powder 1 Application  vancomycin-diluent combo no.1 (Xellia) IVPB 1,750 mg  piperacillin-tazobactam-dextrose (Zosyn) IV 2.25 g  sodium chloride 0.9 % bolus 500 mL  norepinephrine (Levophed) 8 mg in dextrose 5% 250 mL (0.032 mg/mL) infusion (premix)  vancomycin (Vancocin) placeholder  pantoprazole (ProtoNix) EC tablet 40 mg  pantoprazole (ProtoNix) injection 40 mg  sennosides-docusate sodium (Judy-Colace) 8.6-50 mg per tablet 1 tablet  doxycycline (Vibramycin) in dextrose 5 % in water (D5W) 100 mL  mg  LORazepam (Ativan) injection 2 mg  magnesium sulfate IV 2 g  zinc oxide 20 % ointment 1 Application  nystatin (Mycostatin) cream  norepinephrine (Levophed) 8 mg in dextrose 5% 250 mL (0.032 mg/mL) infusion (premix)  heparin 1,000 unit/mL injection 2,000 Units  heparin 1,000 unit/mL injection 2,000 Units  nystatin (Mycostatin) ointment  acetaminophen (Tylenol) oral liquid 1,000 mg  albumin human 25 % solution 12.5 g  perflutren lipid microspheres (Definity) injection 0.5-10 mL of dilution  sulfur hexafluoride microsphr (Lumason) injection 24.28 mg  perflutren protein A microsphere (Optison) injection 0.5 mL  ipratropium-albuteroL (Duo-Neb) 0.5-2.5 mg/3 mL nebulizer solution 3 mL  sodium chloride 3 % nebulizer solution 3 mL  vancomycin-diluent combo no.1 (Xellia) IVPB 750 mg  sennosides-docusate sodium (Judy-Colace) 8.6-50 mg per tablet 1 tablet  acetaminophen (Tylenol) tablet 650 mg  doxycycline (Vibramycin) capsule 100 mg  magnesium oxide (Mag-Ox) tablet 400 mg  vasopressin (Vasostrict) 0.2 unit/mL infusion  norepinephrine (Levophed) 8 mg in dextrose 5% 250 mL (0.032 mg/mL) infusion (premix)  heparin 1,000 unit/mL injection 2,000 Units  heparin 1,000 unit/mL injection 2,000 Units  albumin human 25 % solution 12.5 g  vancomycin (Xellia) 1  g in 200 mL (Xellia) IVPB 1 g  ipratropium-albuteroL (Duo-Neb) 0.5-2.5 mg/3 mL nebulizer solution 3 mL  sodium chloride 3 % nebulizer solution 3 mL  albumin human 5 % infusion 12.5 g  heparin 1,000 unit/mL injection 2,000 Units  heparin 1,000 unit/mL injection 2,000 Units  vancomycin (Vancocin) capsule 125 mg  albumin human 25 % solution 12.5 g  heparin 1,000 unit/mL injection 2,000 Units  heparin 1,000 unit/mL injection 2,000 Units  ipratropium-albuteroL (Duo-Neb) 0.5-2.5 mg/3 mL nebulizer solution 3 mL  lidocaine (Xylocaine) 10 mg/mL (1 %) injection 50 mg  sodium phosphate 15 mmol in sodium chloride 0.9% 250 mL IV  sod phos di, mono-K phos mono (K Phos Neutral) tablet 250 mg  potassium, sodium phosphates (Phos-NaK) 280-160-250 mg packet 1 packet  doxycycline (Vibramycin) capsule 100 mg  fludrocortisone (Florinef) tablet 0.1 mg  gabapentin (Neurontin) capsule 100 mg  acetaminophen (Tylenol) tablet 1,000 mg  benzocaine-menthoL (Dermoplast) topical spray  vancomycin (Vancocin) capsule 125 mg  heparin 1,000 unit/mL injection 2,000 Units  heparin 1,000 unit/mL injection 2,000 Units  magnesium sulfate IV 2 g  albumin human 25 % solution 12.5 g  promethazine (Phenergan) injection 12.5 mg  ondansetron (Zofran) injection 4 mg  lidocaine (Xylocaine) 10 mg/mL (1 %) injection 50 mg  alteplase (Cathflo Activase) injection 2 mg  acetaminophen (Tylenol) tablet 650 mg  psyllium (Metamucil) 3.4 gram packet 1 packet  perflutren lipid microspheres (Definity) injection 0.5-10 mL of dilution  sulfur hexafluoride microsphr (Lumason) injection 24.28 mg  perflutren protein A microsphere (Optison) injection 0.5 mL  epoetin di-epbx (Retacrit) injection 10,000 Units  sulfur hexafluoride microsphr (Lumason) injection 24.28 mg  perflutren lipid microspheres (Definity) injection 0.5-10 mL of dilution  perflutren lipid microspheres (Definity) injection 0.5-10 mL of dilution  sulfur hexafluoride microsphr (Lumason) injection 24.28  mg  perflutren protein A microsphere (Optison) injection 0.5 mL  ammonium lactate (Lac-Hydrin) 12 % lotion 1 Application  norepinephrine (Levophed) 8 mg in dextrose 5% 250 mL (0.032 mg/mL) infusion (premix)  fludrocortisone (Florinef) tablet 0.1 mg  albumin human 25 % solution 25 g  albumin human 25 % solution 25 g  meropenem (Merrem) 500 mg in sodium chloride 0.9 % 100 mL IV  potassium chloride 20 mEq in 100 mL IV premix  potassium chloride 20 mEq in 100 mL IV premix  magnesium sulfate IV 2 g  cefepime (Maxipime) 1 g in dextrose 5 % 50 mL IV  vancomycin-diluent combo no.1 (Xellia) IVPB 750 mg  albumin human 25 % solution 12.5 g  vancomycin (Xellia) 1 g in 200 mL (Xellia) IVPB 1 g  vancomycin (Vancocin) placeholder      Relevant Results  Labs  Results from last 72 hours   Lab Units 02/13/24  0440   WBC AUTO x10*3/uL 10.7   HEMOGLOBIN g/dL 7.8*   HEMATOCRIT % 25.3*   PLATELETS AUTO x10*3/uL 191   NEUTROS PCT AUTO % 60.6   LYMPHS PCT AUTO % 21.9   MONOS PCT AUTO % 11.2   EOS PCT AUTO % 4.7     Results from last 72 hours   Lab Units 02/13/24  0440   SODIUM mmol/L 130*   POTASSIUM mmol/L 3.7   CHLORIDE mmol/L 100   CO2 mmol/L 20*   BUN mg/dL 17   CREATININE mg/dL 2.40*   GLUCOSE mg/dL 183*   CALCIUM mg/dL 8.3*   ANION GAP mmol/L 10   EGFR mL/min/1.73m*2 21*   PHOSPHORUS mg/dL 2.5     Results from last 72 hours   Lab Units 02/13/24  0440   ALK PHOS U/L 140*   BILIRUBIN TOTAL mg/dL 0.6   PROTEIN TOTAL g/dL 5.6*   ALT U/L 6   AST U/L 14   ALBUMIN g/dL 2.6*     Estimated Creatinine Clearance: 22.6 mL/min (A) (by C-G formula based on SCr of 2.4 mg/dL (H)).  C-Reactive Protein   Date Value Ref Range Status   12/25/2023 5.20 (H) 0.00 - 2.00 mg/dL Final     CRP   Date Value Ref Range Status   06/23/2023 19.9 (H) 0 - 2.0 MG/DL Final     Comment:     Performed at [address] [address]   05/22/2022 1.0 0 - 2.0 MG/DL Final     Comment:     Performed at [address] [address]      Microbiology  Susceptibility data from last 14 days.  Collected Specimen Info Organism Ampicillin Aztreonam Cefazolin Cefepime Ceftazidime Ciprofloxacin Clindamycin Erythromycin Gentamicin Levofloxacin Oxacillin Piperacillin/Tazobactam Tetracycline Tobramycin   02/10/24 Tissue/Biopsy from Other (specify in comments) Proteus mirabilis S  S   S   S S  S R      Methicillin Resistant Staphylococcus aureus (MRSA)       R R   R  R      Pseudomonas aeruginosa                 02/10/24 Tissue/Biopsy from Wound/Tissue Proteus mirabilis S  S   S   S S  S R      Pseudomonas aeruginosa  S  S S S    S  S  S     Collected Specimen Info Organism Trimethoprim/Sulfamethoxazole Vancomycin   02/10/24 Tissue/Biopsy from Other (specify in comments) Proteus mirabilis S      Methicillin Resistant Staphylococcus aureus (MRSA) S S     Pseudomonas aeruginosa     02/10/24 Tissue/Biopsy from Wound/Tissue Proteus mirabilis S      Pseudomonas aeruginosa       Imaging  Transthoracic Echo (TTE) Limited    Result Date: 2/7/2024           [place] [address]            Phone [phone] TRANSTHORACIC ECHOCARDIOGRAM REPORT  Patient Name:      BASIA Gray Physician:    [id number] Littlejohn DO Study Date:        2/7/2024            Ordering Provider:    [id number] LITTLEJOHN MRN/PID:           [id number]            Fellow: Accession#:        [id number]        Nurse: Date of Birth/Age: 1950 / 73 years Sonographer:          Rena Talavera ACS,                                                              RDCHILANGO, FABIAN Gender:            F                   Additional Staff: Height:            157.48 cm           Admit Date: Weight:                                Admission Status:     Inpatient - Routine  BSA:               m2                  Department Location:  [facility] ICU Blood Pressure: 95 /42 mmHg Study Type:    TRANSTHORACIC ECHO (TTE) LIMITED Diagnosis/ICD: Hypotension, unspecified-I95.9; Mitral valve disorder-I05.9 Indication:    Limited to assess for MS, Hypotension CPT Codes:     Echo Limited-89256; Color Doppler-54295; Doppler Limited-16596 Patient History: Pertinent History: Hypotension, hx of MV endocarditis, PAD, DM2 ESRD on Hd. Study Detail: The following Echo studies were performed: 2D, M-Mode, Doppler and               color flow. Technically challenging study due to poor acoustic               windows and L Breast implant. Unable to obtain suprasternal notch               view.  PHYSICIAN INTERPRETATION: Left Ventricle: Left ventricular systolic function is normal. There are no regional wall motion abnormalities. The left ventricular cavity size is normal. Left ventricular diastolic filling was not assessed. Left Atrium: The left atrium is moderate to severely dilated. Right Ventricle: The right ventricle is normal in size. There is normal right ventricular global systolic function. Right Atrium: The right atrium was not well visualized. Aortic Valve: The aortic valve was not assessed. Aortic valve regurgitation was not assessed. Mitral Valve: The mitral valve is abnormal. There is evidence of mild mitral valve stenosis. The doppler estimated mean and peak diastolic pressure gradients are 4.0 mmHg and 6.6 mmHg respectively. There is severe mitral annular calcification. There is no evidence of mitral valve regurgitation. Tricuspid Valve: The tricuspid valve was not assessed. Tricuspid regurgitation was not assessed. Pulmonic Valve: The pulmonic valve is not well visualized. The pulmonic valve regurgitation was not assessed. Pericardium: There is no pericardial effusion noted. Aorta: The aortic root was not assessed.  CONCLUSIONS:  1. Left ventricular systolic function is normal.  2. The left  atrium is moderate to severely dilated.  3. There is severe mitral annular calcification. QUANTITATIVE DATA SUMMARY: LV DIASTOLIC FUNCTION:                        Normal Ranges: MV Peak E:    1.23 m/s (0.7-1.2 m/s) MV Peak A:    1.12 m/s (0.42-0.7 m/s) E/A Ratio:    1.10     (1.0-2.2) MV lateral e' 0.04 m/s MV medial e'  0.07 m/s MITRAL VALVE:                      Normal Ranges: MV Vmax:    1.28 m/s (<=1.3m/s) MV peak P.6 mmHg (<5mmHg) MV mean P.0 mmHg (<48mmHg)  RIGHT VENTRICLE: RV Basal 2.97 cm  [id number] Lemus DO Electronically signed on 2024 at 3:50:21 PM  ** Final **     Transthoracic Echo (TTE) Limited    Result Date: 2024           [place] [address]            Phone [phone] TRANSTHORACIC ECHOCARDIOGRAM REPORT  Patient Name:      BASIA Gray Physician:    [id number] Lemus DO Study Date:        2024            Ordering Provider:    [id number] REBECA RESENDEZ MRN/PID:           [id number]            Fellow: Accession#:        [id number]        Nurse: Date of Birth/Age: 1950 / 73 years Sonographer:          Jennifer WALL Gender:            F                   Additional Staff: Height:            157.48 cm           Admit Date:           2024 Weight:            98.88 kg            Admission Status:     Inpatient - Routine BSA:               1.98 m2             Department Location: Blood Pressure: 59 /49 mmHg Study Type:    TRANSTHORACIC ECHO (TTE) LIMITED Diagnosis/ICD: Acute on chronic diastolic (congestive) heart failure                (CHF)-I50.33 Indication:    Acute on chronic diastolic congestive heart failure CPT Codes:     Echo Limited-21174; Color Doppler-45649; Doppler Limited-24530 Patient History: Pertinent History: PAF Peripheralvascular disease low blood pressure HTN  Breast                    Cancer Mixed Hyperlipidemia HF CVA Hypotension DMII CAD CKD. Study Detail: The following Echo studies were performed: 2D, M-Mode, Doppler and               color flow. Technically challenging study due to body habitus,               patient lying in supine position, poor acoustic windows, prominent               lung artifact and Breast Prosthesis. Definity used as a contrast               agent for endocardial border definition. Unable to obtain               suprasternal notch view.  PHYSICIAN INTERPRETATION: Left Ventricle: Left ventricular systolic function is normal, with an estimated ejection fraction of 60-65%. There are no regional wall motion abnormalities. The left ventricular cavity size is normal. Left ventricular diastolic filling was indeterminate. Left Atrium: The left atrium is moderately dilated. Right Ventricle: The right ventricle is normal in size. There is normal right ventricular global systolic function. Right Atrium: The right atrium is normal in size. Aortic Valve: The aortic valve is trileaflet. There is no evidence of aortic valve regurgitation. The peak instantaneous gradient of the aortic valve is 5.3 mmHg. Mitral Valve: The mitral valve is normal in structure. There is severe mitral annular calcification. There is trace mitral valve regurgitation. Tricuspid Valve: The tricuspid valve is structurally normal. There is mild tricuspid regurgitation. Pulmonic Valve: The pulmonic valve is not well visualized. The pulmonic valve regurgitation was not well visualized. Pericardium: There is no pericardial effusion noted. Aorta: The aortic root is normal.  CONCLUSIONS:  1. Left ventricular systolic function is normal with a 60-65% estimated ejection fraction.  2. The left atrium is moderately dilated.  3. There is severe mitral annular calcification. QUANTITATIVE DATA SUMMARY: 2D MEASUREMENTS:                          Normal Ranges: LAs:           3.80 cm   (2.7-4.0cm)  IVSd:          0.92 cm   (0.6-1.1cm) LVPWd:         1.09 cm   (0.6-1.1cm) LVIDd:         2.98 cm   (3.9-5.9cm) LVIDs:         2.12 cm LV Mass Index: 41.2 g/m2 LV % FS        28.9 % LA VOLUME:                               Normal Ranges: LA Vol A4C:        58.3 ml    (22+/-6mL/m2) LA Vol A2C:        60.9 ml LA Vol BP:         62.1 ml LA Vol Index A4C:  29.4ml/m2 LA Vol Index A2C:  30.7 ml/m2 LA Vol Index BP:   31.3 ml/m2 LA Area A4C:       20.5 cm2 LA Area A2C:       20.1 cm2 LA Major Axis A4C: 6.1 cm LA Major Axis A2C: 5.6 cm LA Volume Index:   29.3 ml/m2 LA Vol A4C:        53.1 ml LA Vol A2C:        58.4 ml LV SYSTOLIC FUNCTION BY 2D PLANIMETRY (MOD):                     Normal Ranges: EF-A4C View: 68.2 % (>=55%) EF-A2C View: 65.9 % EF-Biplane:  67.8 % AORTIC VALVE:                         Normal Ranges: AoV Vmax:      1.15 m/s (<=1.7m/s) AoV Peak P.3 mmHg (<20mmHg) LVOT Max Shimon:  1.11 m/s (<=1.1m/s) LVOT VTI:      16.20 cm LVOT Diameter: 1.90 cm  (1.8-2.4cm) AoV Area,Vmax: 2.74 cm2 (2.5-4.5cm2) PULMONIC VALVE:                         Normal Ranges: PV Accel Time: 63 msec  (>120ms) PV Max Shimon:    1.0 m/s  (0.6-0.9m/s) PV Max P.3 mmHg  [id number] Herminio Lemus DO Electronically signed on 2024 at 7:58:25 AM  ** Final **     Upper extremity venous duplex bilateral    Result Date: 2024           [place] [address]            Phone [phone]  Vascular Lab Report  VASC US UPPER EXTREMITY VENOUS DUPLEX BILATERAL Patient Name:      BASIA FREIDA Gray Physician:  [id number] Mini Busby MD, RPVI Study Date:        2024           Ordering Provider:  [id number] ERI BERMAN MRN/PID:           [id number]            Fellow: Accession#:        [id number]        Technologist:       Dara Parr RVT Date of Birth/Age: 1950 / 73 years Technologist 2: Gender:            F                    Encounter#:         [id number] Admission Status:  Inpatient           Location Performed: [facility]  Diagnosis/ICD: Right arm swelling-M79.89; Left arm swelling-M79.89 CPT Codes:     47433 Peripheral venous duplex scan for DVT complete  CONCLUSIONS:  Right Upper Venous: No evidence of acute deep vein thrombus visualized in the right upper extremity. Internal jugular vein was visualized in segments due to IV lines and bandages. Left Upper Venous: No evidence of acute deep vein thrombus visualized in the left upper extremity. Subclavian stent is noted and appears patent. There is a known occluded dialysis access noted.  Additional Findings: Technically difficult exam due to IV lines, bandages, and patient's positioning.  Imaging & Doppler Findings:  Right               Compressible Thrombus        Flow Internal Jugular        Yes        None   Spontaneous/Phasic Subclavian              Yes        None Subclavian Proximal     Yes        None   Spontaneous/Phasic Subclavian Mid          Yes        None Subclavian Distal       Yes        None   Spontaneous/Phasic Axillary                Yes        None       Pulsatile Brachial                Yes        None Cephalic                Yes        None Basilic                 Yes        None  Left                Compress Thrombus        Flow Internal Jugular      Yes      None       Pulsatile Subclavian            Yes      None Subclavian Proximal   Yes      None       Pulsatile Subclavian Mid        Yes      None       Pulsatile Subclavian Distal     Yes      None       Pulsatile Axillary              Yes      None   Spontaneous/Phasic Brachial              Yes      None Cephalic              Yes      None Basilic               Yes      None  [id number] Mini Busby MD, RPVI Electronically signed by [id number] Mini Busby MD, ALICE on 1/25/2024 at 4:06:13 PM  ** Final **     ECG 12 lead    Result Date: 1/24/2024  Sinus tachycardia  with 1st degree AV block Right bundle  branch block Possible Lateral infarct , age undetermined Abnormal ECG Confirmed by Yesika Nj ([id number]) on 1/24/2024 6:54:45 AM    XR tibia fibula right 2 views    Result Date: 1/22/2024  Interpreted By:  Real Mendieta, STUDY: XR TIBIA FIBULA RIGHT 2 VIEWS; 1/22/2024 2:15 pm   INDICATION: Signs/Symptoms:evaluate source of infection, osteomyelitis;   COMPARISON: None available.   ACCESSION NUMBER(S): [id number]   ORDERING CLINICIAN: BAN GIFFORD   TECHNIQUE: Views: AP and Lateral of the right tibia and fibula   FINDINGS: RESULT: There is no evidence for fracture or dislocation. Tricompartmental degenerative changes of the right knee. The tibia and fibula appear intact without bony erosion or evidence for osteomyelitis. No other bony or soft tissue abnormality is identified. No subcutaneous gas is noted.       No evidence for acute osseous abnormality. No bony erosion to suggest osteomyelitis.   Signed by: Real Mendieta 1/22/2024 3:15 PM Dictation workstation:   [id number]    XR ankle right 3+ views    Result Date: 1/22/2024  Interpreted By:  Real Mendieta, STUDY: XR ANKLE RIGHT 3+ VIEWS; 1/22/2024 2:15 pm   INDICATION: Signs/Symptoms:Evuluate source of infection, osteomyelitis;   COMPARISON: None available.   ACCESSION NUMBER(S): [id number]   ORDERING CLINICIAN: BAN GIFFORD   TECHNIQUE: Views: AP, Lateral, Oblique, right ankle   FINDINGS: RESULT: There is no evidence for fracture or dislocation. The ankle mortise is intact. Joint spaces appear adequately maintained. No bony erosion to suggest osteomyelitis. No bone lesion or soft tissue abnormality is identified. No subcutaneous gas is seen.       No evidence for acute osseous abnormality. No bony erosion to suggest osteomyelitis.   Signed by: Real Mendieta 1/22/2024 3:14 PM Dictation workstation:   [id number]    Transthoracic Echo (TTE) Complete    Result Date: 1/22/2024           [address]  [address]            Phone [phone] TRANSTHORACIC ECHOCARDIOGRAM REPORT  Patient Name:      BASIA VEGA      Isaac Physician:   [id number] [facility]o [facility] Study Date:        1/22/2024           Ordering Provider:   [id number] ERI PINO                                                             CULLEN MRN/PID:           [id number]            Fellow: Accession#:        [id number]        Nurse: Date of Birth/Age: 1950 / 73 years Sonographer:         Tabatha Page RDCS Gender:            F                   Additional Staff: Height:            157.00 cm           Admit Date: Weight:            75.00 kg            Admission Status:    Inpatient - Routine BSA:               1.76 m2             Department Location: [facility] Blood Pressure: 88 /49 mmHg Study Type:    TRANSTHORACIC ECHO (TTE) COMPLETE Diagnosis/ICD: Chronic combined systolic (congestive) and diastolic (congestive)                heart failure (CHF)-I50.42; Sepsis, unspecified organism-A41.9 Indication:    heart failure,septic shock CPT Codes:     Echo Complete w Full Doppler-81406 Patient History: BMI:               Obese >30 Pertinent History: Sepsis, PVD, A-Fib, CVA, Cancer and HTN. breast                    prosthesis/ca, ESRD,anemia,septic shock,heart failure. Study Detail: The following Echo studies were performed: 2D, M-Mode, Doppler and               color flow. Technically challenging study due to prosthesis,               prominent lung artifact and body habitus.  PHYSICIAN INTERPRETATION: Left Ventricle: Left ventricular systolic function is normal, with an estimated ejection fraction of 70%. There are no regional wall motion abnormalities. The left ventricular cavity size is normal. Left ventricular diastolic filling was indeterminate. Left Atrium: The left atrium is moderately dilated. Right Ventricle: The right ventricle is normal in size. There is normal right ventricular global systolic function. Right Atrium: The right atrium is normal in  size. Aortic Valve: The aortic valve is trileaflet. There is no evidence of aortic valve regurgitation. The peak instantaneous gradient of the aortic valve is 2.8 mmHg. Mitral Valve: The mitral valve is normal in structure. There is no evidence of mitral valve regurgitation. Tricuspid Valve: The tricuspid valve is structurally normal. There is trace tricuspid regurgitation. Pulmonic Valve: The pulmonic valve is not well visualized. The pulmonic valve regurgitation was not well visualized. Pericardium: There is no pericardial effusion noted. Aorta: The aortic root is normal.  CONCLUSIONS:  1. Left ventricular systolic function is normal with a 70% estimated ejection fraction.  2. The left atrium is moderately dilated.  3. Left ventricular diastolic filling indeterminate. QUANTITATIVE DATA SUMMARY: 2D MEASUREMENTS:                          Normal Ranges: LAs:           4.50 cm   (2.7-4.0cm) IVSd:          0.61 cm   (0.6-1.1cm) LVPWd:         0.88 cm   (0.6-1.1cm) LVIDd:         3.66 cm   (3.9-5.9cm) LV Mass Index: 41.9 g/m2 LV SYSTOLIC FUNCTION BY 2D PLANIMETRY (MOD):                     Normal Ranges: EF-A4C View: 68.3 % (>=55%) LV DIASTOLIC FUNCTION:                     Normal Ranges: MV Peak E: 1.19 m/s (0.7-1.2 m/s) MV Peak A: 0.90 m/s (0.42-0.7 m/s) E/A Ratio: 1.32     (1.0-2.2) MITRAL VALVE:                 Normal Ranges: MV DT: 192 msec (150-240msec) AORTIC VALVE:                         Normal Ranges: AoV Vmax:      0.84 m/s (<=1.7m/s) AoV Peak P.8 mmHg (<20mmHg) LVOT Max Shimon:  0.47 m/s (<=1.1m/s) LVOT Diameter: 1.90 cm  (1.8-2.4cm) AoV Area,Vmax: 1.57 cm2 (2.5-4.5cm2) TRICUSPID VALVE/RVSP:                   Normal Ranges: IVC Diam: 1.05 cm  [id number] [facility] DENNIS Electronically signed on 2024 at 2:53:04 PM  ** Final **     XR foot left 3+ views    Result Date: 2024  Interpreted By:  Real Mendieta, STUDY: XR FOOT LEFT 1-2 VIEWS; 2024 4:15 pm   INDICATION: Signs/Symptoms:osteomyelitis.  Pain   COMPARISON: 12/07/2023   ACCESSION NUMBER(S): [id number]   ORDERING CLINICIAN: BAN GIFFORD   TECHNIQUE: Views:  AP, Lat, Oblique, left foot   FINDINGS: RESULT: There is no evidence for fracture or dislocation. 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 Mild hammertoe deformities. Joint spaces appear adequately maintained. Soft tissue ulceration of the posterior aspect of the heel however no evidence for bony erosion to suggest osteomyelitis.       No evidence for acute fracture or acute bony erosion to suggest osteomyelitis. Chronic changes as described.   Signed by: Real Mendieta 1/21/2024 7:15 PM Dictation workstation:   [id number]    CT chest abdomen pelvis wo IV contrast    Result Date: 1/21/2024  Interpreted By:  Maria Garcia, STUDY: CT CHEST ABDOMEN PELVIS WO CONTRAST;  1/20/2024 11:42 pm   INDICATION: Mental status change. Elevated white blood cell count with infectious workup.   COMPARISON: 08/20/2023   ACCESSION NUMBER(S): [id number]   ORDERING CLINICIAN: BAN GIFFORD   TECHNIQUE: CT of the chest, abdomen and pelvis was performed with no oral or intravenous contrast administered. Sagittal and coronal reformations were completed by the technologist at the acquisition scanner.   All CT examinations are performed with 1 or more of the following dose reduction techniques: Automated exposure control, adjustment of mA and/or kv according to patient's size, or use of iterative reconstruction techniques.   FINDINGS: Please note that the study is limited without intravenous contrast.   CHEST: Bilateral pleural effusions are present with right effusion moderately small in size and with the left effusion moderately small in size as well. There is shift of the heart and mediastinum to the left of midline due to atelectasis of the left upper lobe. There is consolidation throughout left lower lobe with air bronchograms noted. On the right, there is compressive atelectasis seen  posteriorly in the right lower lobe.   There is mild reactive mediastinal adenopathy seen at this time with mediastinal lymph nodes increased in size since prior study. Several of these mediastinal nodes are at the upper limits of normal measuring 8 mm in short axis diameter.   No pericardial effusion is present. The cardiac size is normal with mitral annulus calcification.   There has been prior bilateral mastectomy with reconstruction of the left breast with implant placement. Surgical clips are visible within the left axilla. Stent grafts are visible within the left upper extremity and within the left innominate, subclavian as well as axillary veins.   A peripherally calcified 1.8 cm nodule arises from the lower pole of left thyroid lobe.   ABDOMEN:   LIVER: Unremarkable.   BILE DUCTS: No intrahepatic or extrahepatic biliary ductal dilatation is seen.   GALLBLADDER: Cholelithiasis is observed with some calcification of the gallbladder wall demonstrated as well. No gallbladder wall thickening or pericholecystic fluid is evident.   PANCREAS: Unremarkable   SPLEEN: Unremarkable   ADRENAL GLANDS: Unremarkable   KIDNEYS AND URETERS: Kidneys are small in size with extensive renal artery calcification demonstrated.   PELVIS:   BLADDER: Amos catheter is present within the decompressed urinary bladder.   REPRODUCTIVE ORGANS: Calcification of the arcuate arteries within the uterus is seen. There is no uterine enlargement or adnexal mass.   BOWEL: A rectal balloon is seen. There is no abnormal distention of the intestinal tract.   VESSELS: There is atherosclerosis of the thoracoabdominal aorta and iliac arteries with calcification in the walls of the celiac, splenic, hepatic, and mesenteric arteries.   PERITONEUM/RETROPERITONEUM/LYMPH NODES: There is a minimal amount of ascites seen about the liver and spleen with mild presacral edema noted.   ABDOMINAL WALL: There is anasarca involving the soft tissues of the abdomen and  pelvis. Postoperative change from ventral hernia repair is seen.   BONES: Bilateral sacroiliitis is seen. There is lumbar dextroscoliosis. Chronic rotator cuff tear is present bilaterally with osteoarthritis of both shoulders, right greater than left.       Chest 1.  Moderately small bilateral pleural effusions with left upper lobe atelectasis and compressive atelectasis seen posteriorly in right lower lobe. A left lower lobe pneumonia is identified. There is extensive airspace consolidation within left lower lobe with air bronchograms observed. Mild mediastinal reactive adenopathy is present as well.   Abdomen-Pelvis 1.  Cholelithiasis without evidence of cholecystitis. 2. Small amount of ascites with mild presacral edema and anasarca within the soft tissues of the abdominal wall. 3. Renal atrophy with extensive vascular calcifications.     MACRO: None   Signed by: Maria Garcia 1/21/2024 8:30 AM Dictation workstation:   [id number]    CT head wo IV contrast    Result Date: 1/21/2024  Interpreted By:  Maria Garcia, STUDY: CT HEAD WO IV CONTRAST 1/20/2024 11:42 pm   INDICATION: Signs/Symptoms:mental status changes   COMPARISON: 12/11/2023   ACCESSION NUMBER(S): [id number]   ORDERING CLINICIAN: BAN GIFFORD   TECHNIQUE: Unenhanced axial images of the brain are completed.   All CT examinations are performed with 1 or more of the following dose reduction techniques: Automated exposure control, adjustment of mA and/or kv according to patient's size, or use of iterative reconstruction techniques.   FINDINGS: Helical unenhanced axial images of the brain demonstrate a mild to moderate degree of ventricular enlargement with proportionate widening of the sulci and sylvian fissures. There is no midline shift, mass effect, extra-axial fluid collection, or acute intracranial hemorrhage. There is diminished density seen in the periventricular white matter indicating chronic microvascular ischemic disease. There is calcified plaque  seen within the distal vertebral and internal carotid arteries bilaterally. No calvarial abnormality is seen.       Atrophy and chronic microvascular ischemic disease without acute intracranial process.   Signed by: Maria Garcia 1/21/2024 8:09 AM Dictation workstation:   [id number]    XR chest 1 view    Result Date: 1/20/2024  Interpreted By:  Brendon Perez, STUDY: XR CHEST 1 VIEW; 1/20/2024 5:03 pm   INDICATION: CLINICAL INFORMATION: Signs/Symptoms:Insertion right IJ central line, also left thoracentesis.   COMPARISON: 01/19/2024 at 1338 hours   ACCESSION NUMBER(S): [id number]   ORDERING CLINICIAN: BOZEAN ANTONIO   TECHNIQUE: Portable chest one view.   FINDINGS: The cardiac size is indeterminate in view of the AP projection. There is no change in the right-sided dual port central venous catheter. There is a new right internal jugular venous catheter present with the tip at approximately same level as the dual port central venous catheter, overlying the mid to lower right atrium. There is no evidence for pneumothorax. Patient has a history of left thoracentesis without evidence for pneumothorax on the left. There is bilateral basilar alveolar infiltrate and effusions. Left effusion is decreased compared to the study from 1 day earlier.   Surgical clips are identified within left chest wall. Endovascular stent is identified in the distribution of the left subclavian artery.       1. Status post right-sided central venous catheter placement as described above with the tip overlying the mid to caudal aspect of the right atrium. There is no evidence for pneumothorax. 2. No evidence for left-sided pneumothorax after left-sided thoracentesis. Decrease in the left effusion. 3. Bilateral basilar infiltrates and effusions are present. Follow-up to assure complete clearing is suggested.   MACRO: none   Signed by: Brendon Perez 1/20/2024 5:11 PM Dictation workstation:   [id number]    XR chest 1 view    Result Date:  1/19/2024  Interpreted By:  Real Mendieta, STUDY: XR CHEST 1 VIEW; 1/19/2024 1:38 pm   INDICATION: Signs/Symptoms:dyspnea.   COMPARISON: 12/26/2023   ACCESSION NUMBER(S): [id number]   ORDERING CLINICIAN: EMELY GOLDSMITH   TECHNIQUE: 1 view of the chest was performed.   FINDINGS: There may be a minimal right pleural effusion. Slight prominence of the interstitium otherwise the right lung is clear. Improved appearance of the left lung with improved aeration of the right upper lobe. There is opacification of the left lower lobe possibly due to large pleural effusion which is similar to the prior study. No pneumothorax. Right-sided dual lumen central line catheter with tip at the proximal atrium. The cardiomediastinal silhouette is within normal limits. Left-sided subclavian stents are again noted.       Improved aeration and appearance of the left lung superiorly however there is a persistent large left pleural effusion and opacification of the left lower lobe.   Signed by: Real Mendieta 1/19/2024 1:44 PM Dictation workstation:   [id number]     Assessment/Plan   Septic shock-resolved  Left-sided pleural effusion   Left lower lobe pneumonia-treated  Proteus urinary tract infection-treated  History of MRSA endocarditis  History of C. difficile infection  Infected bilateral heel ulcers-wound culture growing Pseudomonas, Proteus, MRSA  Fggsjrnklzqg-oqgkrukyiuyaba-vemvyaus  Type 2 diabetes with peripheral neuropathy with gangrene-Matson 3 versus 4  Severe malnutrition-prealbumin less than 3           Monitor off pressors  IV cefepime-coverage for Pseudomonas and Proteus  IV vancomycin-coverage for MRSA  Continue oral doxycycline-suppressive therapy  Continue oral vancomycin-patient at risk for relapse  Contact plus precautions-at risk for relapse   final recommendations based on culture results  Supportive care  Monitor temperature and WBC  Local care  Offloading      Stephen Coulter MD

## 2024-02-16 NOTE — NURSING NOTE
Pt is awake and alert, no complaints of discomfort or pain. Sister at the bedside. Pt is ordering dinner. Tolerating tablo.

## 2024-02-16 NOTE — PROGRESS NOTES
CONSULT PROGRESS NOTES    SERVICE DATE: 2/16/2024   SERVICE TIME: 10:50 AM    CONSULTING SERVICE: Nephrology    ASSESSMENT AND PLAN   1.  End-stage renal disease  2.  Hypotension  3.  Hyponatremia  4.  Hypokalemia  5.  Anemia of chronic kidney disease     Dialysis today with Tablo using low temperature and minimal fluid removal.  She has been tolerating roughly 1.5 L volume removal off of pressors and with minimal IV albumin.  Now off of pressors for about 3 days now.  Hyponatremia from volume overload in this patient with anuria.  Hypokalemia improved, now using 3K bath.  I would not replace the potassium.  No need for daily chemistry panels.  Hemoglobin is low, using Retacrit 10,000 units IV 3 times a week with dialysis.  None of her options for disposition are great, as delineated by myself and numerous prior notes and the palliative care team.    She remains full code, despite numerous attempts from ICU team, palliative care, and myself to redefine and be more realistic with her goals of care.  Attempt dialysis tomorrow again off pressors and even without any as needed albumin.  From a mental status standpoint, she seems to be tolerating her blood pressure where it is.  Should be reconsider LTAC?  Off pressors now.  Dr. Mcgee is available this weekend.  No dialysis needs are anticipated though.    SUBJECTIVE  INTERVAL HPI: She feels okay.  No new issues.  Numerous family members present at bedside, patient does have an appetite.  She remains off of pressors.  Seems to be mentating well.    MEDICATIONS:  apixaban, 2.5 mg, oral, BID  cefepime, 1 g, intravenous, q24h  doxycylcine, 100 mg, oral, Daily  epoetin di or biosimilar, 10,000 Units, intravenous, Every Mon/Wed/Fri  escitalopram, 10 mg, oral, Nightly  fludrocortisone, 0.1 mg, oral, TID  gabapentin, 100 mg, oral, TID  heparin, 2,000 Units, intra-catheter, After Dialysis  heparin, 2,000 Units, intra-catheter, After Dialysis  heparin, 2,000 Units,  intra-catheter, After Dialysis  heparin, 2,000 Units, intra-catheter, After Dialysis  insulin lispro, 0-10 Units, subcutaneous, TID with meals  ipratropium-albuteroL, 3 mL, nebulization, q6h while awake  lidocaine, 5 mL, infiltration, Once  midodrine, 15 mg, oral, TID with meals  nystatin, , Topical, BID  nystatin, , Topical, BID  pantoprazole, 40 mg, oral, Daily   Or  pantoprazole, 40 mg, intravenous, Daily  perflutren lipid microspheres, 0.5-10 mL of dilution, intravenous, Once in imaging  perflutren protein A microsphere, 0.5 mL, intravenous, Once in imaging  psyllium, 1 packet, oral, BID  sulfur hexafluoride microsphr, 2 mL, intravenous, Once in imaging  sulfur hexafluoride microsphr, 2 mL, intravenous, Once in imaging  vancomycin, 125 mg, oral, BID  vancomycin (Xellia) 1 g in 200 mL, 1 g, intravenous, Once  zinc oxide, 1 Application, Topical, BID       norepinephrine, 0.01-3 mcg/kg/min, Last Rate: Stopped (02/12/24 1700)       PRN medications: acetaminophen, albumin human, alteplase, ammonium lactate, benzocaine-menthoL, dextrose 10 % in water (D10W), dextrose, glucagon, ondansetron, oxygen, sennosides-docusate sodium, vancomycin     OBJECTIVE  PHYSICAL EXAM:   Heart Rate:  []   Temp:  [36.2 °C (97.2 °F)-37 °C (98.6 °F)]   Resp:  [14-24]   BP: (47-90)/(29-54)   Weight:  [105 kg (231 lb 11.3 oz)]   SpO2:  [91 %-96 %]   Body mass index is 42.38 kg/m².  Chronically ill-appearing elderly white woman  Pale skin  Right-sided hand swelling  Left-sided finger amputation  Internal jugular tunneled hemodialysis catheter in place  There is no significant pretibial edema on both lower extremities  Soft abdomen  No Amos  No obvious joint deformities  Moist mucosa  Hearing seems to be intact  Phonation intact  Rectal tube in place       DATA:   Labs:  Results for orders placed or performed during the hospital encounter of 01/19/24 (from the past 96 hour(s))   POCT GLUCOSE   Result Value Ref Range    POCT Glucose 190  (H) 74 - 99 mg/dL   POCT GLUCOSE   Result Value Ref Range    POCT Glucose 133 (H) 74 - 99 mg/dL   POCT GLUCOSE   Result Value Ref Range    POCT Glucose 170 (H) 74 - 99 mg/dL   Hepatitis B core antibody, IgM   Result Value Ref Range    Hepatitis B Core AB; IgM Nonreactive Nonreactive   Hepatitis B core antibody, total   Result Value Ref Range    Hepatitis B Core AB- Total Nonreactive Nonreactive   Hepatitis B e antibody   Result Value Ref Range    Hep Be Antibody Negative Negative   Hepatitis B surface antibody   Result Value Ref Range    Hepatitis B Surface AB 4.4 <10.0 mIU/mL   Hepatitis B surface antigen   Result Value Ref Range    Hepatitis B Surface AG Nonreactive Nonreactive   CBC and Auto Differential   Result Value Ref Range    WBC 10.7 4.4 - 11.3 x10*3/uL    nRBC 0.0 0.0 - 0.0 /100 WBCs    RBC 2.50 (L) 4.00 - 5.20 x10*6/uL    Hemoglobin 7.8 (L) 12.0 - 16.0 g/dL    Hematocrit 25.3 (L) 36.0 - 46.0 %     (H) 80 - 100 fL    MCH 31.2 26.0 - 34.0 pg    MCHC 30.8 (L) 32.0 - 36.0 g/dL    RDW 22.3 (H) 11.5 - 14.5 %    Platelets 191 150 - 450 x10*3/uL    Neutrophils % 60.6 40.0 - 80.0 %    Immature Granulocytes %, Automated 0.8 0.0 - 0.9 %    Lymphocytes % 21.9 13.0 - 44.0 %    Monocytes % 11.2 2.0 - 10.0 %    Eosinophils % 4.7 0.0 - 6.0 %    Basophils % 0.8 0.0 - 2.0 %    Neutrophils Absolute 6.47 (H) 1.60 - 5.50 x10*3/uL    Immature Granulocytes Absolute, Automated 0.09 0.00 - 0.50 x10*3/uL    Lymphocytes Absolute 2.33 0.80 - 3.00 x10*3/uL    Monocytes Absolute 1.19 (H) 0.05 - 0.80 x10*3/uL    Eosinophils Absolute 0.50 (H) 0.00 - 0.40 x10*3/uL    Basophils Absolute 0.08 0.00 - 0.10 x10*3/uL   Comprehensive Metabolic Panel   Result Value Ref Range    Glucose 183 (H) 65 - 99 mg/dL    Sodium 130 (L) 133 - 145 mmol/L    Potassium 3.7 3.4 - 5.1 mmol/L    Chloride 100 97 - 107 mmol/L    Bicarbonate 20 (L) 24 - 31 mmol/L    Urea Nitrogen 17 8 - 25 mg/dL    Creatinine 2.40 (H) 0.40 - 1.60 mg/dL    eGFR 21 (L) >60  mL/min/1.73m*2    Calcium 8.3 (L) 8.5 - 10.4 mg/dL    Albumin 2.6 (L) 3.5 - 5.0 g/dL    Alkaline Phosphatase 140 (H) 35 - 125 U/L    Total Protein 5.6 (L) 5.9 - 7.9 g/dL    AST 14 5 - 40 U/L    Bilirubin, Total 0.6 0.1 - 1.2 mg/dL    ALT 6 5 - 40 U/L    Anion Gap 10 <=19 mmol/L   Magnesium   Result Value Ref Range    Magnesium 1.50 (L) 1.60 - 3.10 mg/dL   Phosphorus   Result Value Ref Range    Phosphorus 2.5 2.5 - 4.5 mg/dL   Morphology   Result Value Ref Range    RBC Morphology No significant RBC morphology present    POCT GLUCOSE   Result Value Ref Range    POCT Glucose 158 (H) 74 - 99 mg/dL   POCT GLUCOSE   Result Value Ref Range    POCT Glucose 163 (H) 74 - 99 mg/dL   POCT GLUCOSE   Result Value Ref Range    POCT Glucose 170 (H) 74 - 99 mg/dL   POCT GLUCOSE   Result Value Ref Range    POCT Glucose 212 (H) 74 - 99 mg/dL   Vancomycin   Result Value Ref Range    Vancomycin 13.9 10.0 - 20.0 ug/mL   Lavender Top   Result Value Ref Range    Extra Tube Hold for add-ons.    POCT GLUCOSE   Result Value Ref Range    POCT Glucose 163 (H) 74 - 99 mg/dL   POCT GLUCOSE   Result Value Ref Range    POCT Glucose 229 (H) 74 - 99 mg/dL   POCT GLUCOSE   Result Value Ref Range    POCT Glucose 133 (H) 74 - 99 mg/dL   POCT GLUCOSE   Result Value Ref Range    POCT Glucose 150 (H) 74 - 99 mg/dL   Basic metabolic panel   Result Value Ref Range    Glucose 219 (H) 65 - 99 mg/dL    Sodium 132 (L) 133 - 145 mmol/L    Potassium 3.4 3.4 - 5.1 mmol/L    Chloride 99 97 - 107 mmol/L    Bicarbonate 19 (L) 24 - 31 mmol/L    Urea Nitrogen 21 8 - 25 mg/dL    Creatinine 2.50 (H) 0.40 - 1.60 mg/dL    eGFR 20 (L) >60 mL/min/1.73m*2    Calcium 8.3 (L) 8.5 - 10.4 mg/dL    Anion Gap 14 <=19 mmol/L   CBC and Auto Differential   Result Value Ref Range    WBC 12.6 (H) 4.4 - 11.3 x10*3/uL    nRBC 0.0 0.0 - 0.0 /100 WBCs    RBC 2.59 (L) 4.00 - 5.20 x10*6/uL    Hemoglobin 8.0 (L) 12.0 - 16.0 g/dL    Hematocrit 26.1 (L) 36.0 - 46.0 %     (H) 80 - 100 fL     MCH 30.9 26.0 - 34.0 pg    MCHC 30.7 (L) 32.0 - 36.0 g/dL    RDW 21.3 (H) 11.5 - 14.5 %    Platelets 192 150 - 450 x10*3/uL    Neutrophils % 55.3 40.0 - 80.0 %    Immature Granulocytes %, Automated 1.0 (H) 0.0 - 0.9 %    Lymphocytes % 26.1 13.0 - 44.0 %    Monocytes % 9.5 2.0 - 10.0 %    Eosinophils % 7.1 0.0 - 6.0 %    Basophils % 1.0 0.0 - 2.0 %    Neutrophils Absolute 6.98 (H) 1.60 - 5.50 x10*3/uL    Immature Granulocytes Absolute, Automated 0.12 0.00 - 0.50 x10*3/uL    Lymphocytes Absolute 3.28 (H) 0.80 - 3.00 x10*3/uL    Monocytes Absolute 1.19 (H) 0.05 - 0.80 x10*3/uL    Eosinophils Absolute 0.89 (H) 0.00 - 0.40 x10*3/uL    Basophils Absolute 0.12 (H) 0.00 - 0.10 x10*3/uL   Morphology   Result Value Ref Range    RBC Morphology See Below     Polychromasia Mild     Spherocytes Few     Target Cells Few    POCT GLUCOSE   Result Value Ref Range    POCT Glucose 206 (H) 74 - 99 mg/dL   POCT GLUCOSE   Result Value Ref Range    POCT Glucose 191 (H) 74 - 99 mg/dL   POCT GLUCOSE   Result Value Ref Range    POCT Glucose 188 (H) 74 - 99 mg/dL   Vancomycin   Result Value Ref Range    Vancomycin 20.0 10.0 - 20.0 ug/mL   POCT GLUCOSE   Result Value Ref Range    POCT Glucose 161 (H) 74 - 99 mg/dL         SIGNATURE: Saeed Mondragon MD PATIENT NAME: Ayanna Gross   DATE: February 16, 2024 MRN: [id number]   TIME: 10:50 AM PAGER: [phone]

## 2024-02-16 NOTE — PROGRESS NOTES
Vancomycin Dosing by Pharmacy- FOLLOW-UP (HEMODIALYSIS)    Ayanna Gross is a 73 y.o. year old female who Pharmacy has been consulted for vancomycin dosing for Vancomycin Indications: Skin & Soft Tissue. Based on the patient's indication and renal status this patient will be dosed based on a pre-HD level of 20-25 mcg/mL.     Patient is currently on hemodialysis.    Vancomycin maintenance dose: 1000 mg after each dialysis session User Schedule (TBD) getting dialysis today. Next anticipated dialysis is Monday, 219     Vancomycin pre-HD level 20.0 (from 2/16)    Lab Results   Component Value Date    VANCORANDOM 20.0 02/16/2024       Visit Vitals  BP (!) 65/39   Pulse 88   Temp 36.2 °C (97.2 °F) (Temporal)   Resp 14        Lab Results   Component Value Date    CREATININE 2.50 (H) 02/15/2024    CREATININE 2.40 (H) 02/13/2024    CREATININE 1.60 02/12/2024    CREATININE 2.70 (H) 02/11/2024       I/O last 3 completed shifts:  In: 390 (4.2 mL/kg) [P.O.:240; I.V.:50 (0.5 mL/kg); IV Piggyback:100]  Out: 1600 (17.2 mL/kg) [Stool:1600]  Dosing Weight: 93 kg     Assessment/Plan     Within goal random/trough level. Will receive 1g after dialysis today.  Pre-HD level will be obtained on 2/19 at 0500. May be obtained sooner if clinically indicated. If level is above goal, random level with AM labs the next morning will be obtained.   Will continue to monitor renal function daily while on vancomycin and order serum creatinine at least every 48 hours if not already ordered.  Follow for continued vancomycin needs, clinical response, and signs/symptoms of toxicity.     Kendall Alfonso, PharmD

## 2024-02-16 NOTE — PROGRESS NOTES
Ayanna Gross is a 73 y.o. female on day 28 of admission presenting with Septic shock (CMS/HCC).    Subjective   Interval History:   Afebrile, no chills  Remains off pressors  Sister present  Awake, alert        Review of Systems    Objective   Range of Vitals (last 24 hours)  Heart Rate:  []   Temp:  [36.2 °C (97.2 °F)-37 °C (98.6 °F)]   Resp:  [14-24]   BP: (47-90)/(29-54)   Weight:  [105 kg (231 lb 11.3 oz)]   SpO2:  [91 %-96 %]   Daily Weight  02/16/24 : 105 kg (231 lb 11.3 oz)    Body mass index is 42.38 kg/m².    Physical Exam  Constitutional:       Appearance: Awake, alert  HENT:      Head: Normocephalic and atraumatic.      Nose: Nose normal.   Eyes:      Extraocular Movements: Extraocular movements intact.      Conjunctiva/sclera: Conjunctivae normal.   Cardiovascular:      Heart sounds: Normal heart sounds, S1 normal and S2 normal.   Pulmonary:      Breath sounds: Decreased breath sounds present.   Abdominal:      General: Bowel sounds are normal.      Palpations: Abdomen is soft.   Musculoskeletal:      Cervical back: Normal range of motion and neck supple.   Skin:     Comments: Stage III sacral ulcer, bilateral posterior heel eschar -photos examined  Neurological:      Mental Status: She is awake, alert    Antibiotics  aspirin tablet 325 mg  acetaminophen (Tylenol) tablet 650 mg  cefepime (Maxipime) 1 g in dextrose 5 % 50 mL IV  sodium chloride 0.9 % bolus 2,229 mL  apixaban (Eliquis) tablet 2.5 mg  escitalopram (Lexapro) tablet 10 mg  febuxostat (Uloric) tablet 40 mg  fenofibrate (Triglide) tablet 160 mg  gabapentin (Neurontin) capsule 100 mg  midodrine (Proamatine) tablet 15 mg  nystatin (Mycostatin) 100,000 unit/gram powder  oxyCODONE-acetaminophen (Percocet) 5-325 mg per tablet 1 tablet  simvastatin (Zocor) tablet 20 mg  piperacillin-tazobactam-dextrose (Zosyn) IV 2.25 g  vancomycin (Vancocin) capsule 125 mg  oxygen (O2) therapy  dextrose 50 % injection 25 g  glucagon (Glucagen) injection 1  mg  dextrose 10 % in water (D10W) infusion  insulin lispro (HumaLOG) injection 0-10 Units  nystatin (Mycostatin) 100,000 unit/gram powder 1 Application  vancomycin-diluent combo no.1 (Xellia) IVPB 1,750 mg  piperacillin-tazobactam-dextrose (Zosyn) IV 2.25 g  sodium chloride 0.9 % bolus 500 mL  norepinephrine (Levophed) 8 mg in dextrose 5% 250 mL (0.032 mg/mL) infusion (premix)  vancomycin (Vancocin) placeholder  pantoprazole (ProtoNix) EC tablet 40 mg  pantoprazole (ProtoNix) injection 40 mg  sennosides-docusate sodium (Judy-Colace) 8.6-50 mg per tablet 1 tablet  doxycycline (Vibramycin) in dextrose 5 % in water (D5W) 100 mL  mg  LORazepam (Ativan) injection 2 mg  magnesium sulfate IV 2 g  zinc oxide 20 % ointment 1 Application  nystatin (Mycostatin) cream  norepinephrine (Levophed) 8 mg in dextrose 5% 250 mL (0.032 mg/mL) infusion (premix)  heparin 1,000 unit/mL injection 2,000 Units  heparin 1,000 unit/mL injection 2,000 Units  nystatin (Mycostatin) ointment  acetaminophen (Tylenol) oral liquid 1,000 mg  albumin human 25 % solution 12.5 g  perflutren lipid microspheres (Definity) injection 0.5-10 mL of dilution  sulfur hexafluoride microsphr (Lumason) injection 24.28 mg  perflutren protein A microsphere (Optison) injection 0.5 mL  ipratropium-albuteroL (Duo-Neb) 0.5-2.5 mg/3 mL nebulizer solution 3 mL  sodium chloride 3 % nebulizer solution 3 mL  vancomycin-diluent combo no.1 (Xellia) IVPB 750 mg  sennosides-docusate sodium (Judy-Colace) 8.6-50 mg per tablet 1 tablet  acetaminophen (Tylenol) tablet 650 mg  doxycycline (Vibramycin) capsule 100 mg  magnesium oxide (Mag-Ox) tablet 400 mg  vasopressin (Vasostrict) 0.2 unit/mL infusion  norepinephrine (Levophed) 8 mg in dextrose 5% 250 mL (0.032 mg/mL) infusion (premix)  heparin 1,000 unit/mL injection 2,000 Units  heparin 1,000 unit/mL injection 2,000 Units  albumin human 25 % solution 12.5 g  vancomycin (Xellia) 1 g in 200 mL (Xellia) IVPB 1  g  ipratropium-albuteroL (Duo-Neb) 0.5-2.5 mg/3 mL nebulizer solution 3 mL  sodium chloride 3 % nebulizer solution 3 mL  albumin human 5 % infusion 12.5 g  heparin 1,000 unit/mL injection 2,000 Units  heparin 1,000 unit/mL injection 2,000 Units  vancomycin (Vancocin) capsule 125 mg  albumin human 25 % solution 12.5 g  heparin 1,000 unit/mL injection 2,000 Units  heparin 1,000 unit/mL injection 2,000 Units  ipratropium-albuteroL (Duo-Neb) 0.5-2.5 mg/3 mL nebulizer solution 3 mL  lidocaine (Xylocaine) 10 mg/mL (1 %) injection 50 mg  sodium phosphate 15 mmol in sodium chloride 0.9% 250 mL IV  sod phos di, mono-K phos mono (K Phos Neutral) tablet 250 mg  potassium, sodium phosphates (Phos-NaK) 280-160-250 mg packet 1 packet  doxycycline (Vibramycin) capsule 100 mg  fludrocortisone (Florinef) tablet 0.1 mg  gabapentin (Neurontin) capsule 100 mg  acetaminophen (Tylenol) tablet 1,000 mg  benzocaine-menthoL (Dermoplast) topical spray  vancomycin (Vancocin) capsule 125 mg  heparin 1,000 unit/mL injection 2,000 Units  heparin 1,000 unit/mL injection 2,000 Units  magnesium sulfate IV 2 g  albumin human 25 % solution 12.5 g  promethazine (Phenergan) injection 12.5 mg  ondansetron (Zofran) injection 4 mg  lidocaine (Xylocaine) 10 mg/mL (1 %) injection 50 mg  alteplase (Cathflo Activase) injection 2 mg  acetaminophen (Tylenol) tablet 650 mg  psyllium (Metamucil) 3.4 gram packet 1 packet  perflutren lipid microspheres (Definity) injection 0.5-10 mL of dilution  sulfur hexafluoride microsphr (Lumason) injection 24.28 mg  perflutren protein A microsphere (Optison) injection 0.5 mL  epoetin di-epbx (Retacrit) injection 10,000 Units  sulfur hexafluoride microsphr (Lumason) injection 24.28 mg  perflutren lipid microspheres (Definity) injection 0.5-10 mL of dilution  perflutren lipid microspheres (Definity) injection 0.5-10 mL of dilution  sulfur hexafluoride microsphr (Lumason) injection 24.28 mg  perflutren protein A microsphere  (Optison) injection 0.5 mL  ammonium lactate (Lac-Hydrin) 12 % lotion 1 Application  norepinephrine (Levophed) 8 mg in dextrose 5% 250 mL (0.032 mg/mL) infusion (premix)  fludrocortisone (Florinef) tablet 0.1 mg  albumin human 25 % solution 25 g  albumin human 25 % solution 25 g  meropenem (Merrem) 500 mg in sodium chloride 0.9 % 100 mL IV  potassium chloride 20 mEq in 100 mL IV premix  potassium chloride 20 mEq in 100 mL IV premix  magnesium sulfate IV 2 g  cefepime (Maxipime) 1 g in dextrose 5 % 50 mL IV  vancomycin-diluent combo no.1 (Xellia) IVPB 750 mg  albumin human 25 % solution 12.5 g  vancomycin (Xellia) 1 g in 200 mL (Xellia) IVPB 1 g  vancomycin (Vancocin) placeholder  vancomycin (Xellia) 1 g in 200 mL (Xellia) IVPB 1 g      Relevant Results  Labs  Results from last 72 hours   Lab Units 02/15/24  0933   WBC AUTO x10*3/uL 12.6*   HEMOGLOBIN g/dL 8.0*   HEMATOCRIT % 26.1*   PLATELETS AUTO x10*3/uL 192   NEUTROS PCT AUTO % 55.3   LYMPHS PCT AUTO % 26.1   MONOS PCT AUTO % 9.5   EOS PCT AUTO % 7.1     Results from last 72 hours   Lab Units 02/15/24  0933   SODIUM mmol/L 132*   POTASSIUM mmol/L 3.4   CHLORIDE mmol/L 99   CO2 mmol/L 19*   BUN mg/dL 21   CREATININE mg/dL 2.50*   GLUCOSE mg/dL 219*   CALCIUM mg/dL 8.3*   ANION GAP mmol/L 14   EGFR mL/min/1.73m*2 20*         Estimated Creatinine Clearance: 23.5 mL/min (A) (by C-G formula based on SCr of 2.5 mg/dL (H)).  C-Reactive Protein   Date Value Ref Range Status   12/25/2023 5.20 (H) 0.00 - 2.00 mg/dL Final     CRP   Date Value Ref Range Status   06/23/2023 19.9 (H) 0 - 2.0 MG/DL Final     Comment:     Performed at [address] [address]   05/22/2022 1.0 0 - 2.0 MG/DL Final     Comment:     Performed at [address] [address]     Microbiology  Susceptibility data from last 14 days.  Collected Specimen Info Organism Ampicillin Aztreonam Cefazolin Cefepime Ceftazidime Ciprofloxacin Clindamycin Erythromycin  Gentamicin Levofloxacin Oxacillin Piperacillin/Tazobactam Tetracycline Tobramycin   02/10/24 Tissue/Biopsy from Other (specify in comments) Proteus mirabilis S  S   S   S S  S R      Methicillin Resistant Staphylococcus aureus (MRSA)       R R   R  R      Pseudomonas aeruginosa                 02/10/24 Tissue/Biopsy from Wound/Tissue Proteus mirabilis S  S   S   S S  S R      Pseudomonas aeruginosa  S  S S S    S  S  S     Collected Specimen Info Organism Trimethoprim/Sulfamethoxazole Vancomycin   02/10/24 Tissue/Biopsy from Other (specify in comments) Proteus mirabilis S      Methicillin Resistant Staphylococcus aureus (MRSA) S S     Pseudomonas aeruginosa     02/10/24 Tissue/Biopsy from Wound/Tissue Proteus mirabilis S      Pseudomonas aeruginosa     Reviewed  Imaging  Transthoracic Echo (TTE) Limited    Result Date: 2/7/2024           [place] [address]            Phone [phone] TRANSTHORACIC ECHOCARDIOGRAM REPORT  Patient Name:      BASIA Gray Physician:    [id number] Littlejohn DO Study Date:        2/7/2024            Ordering Provider:    [id number] LITTLEJOHN MRN/PID:           [id number]            Fellow: Accession#:        [id number]        Nurse: Date of Birth/Age: 1950 / 73 years Sonographer:          Rena QUARLES,                                                              YAEL, FABIAN Gender:            F                   Additional Staff: Height:            157.48 cm           Admit Date: Weight:                                Admission Status:     Inpatient - Routine BSA:               m2                  Department Location:  [facility] Blood Pressure: 95 /42 mmHg Study Type:    TRANSTHORACIC ECHO (TTE) LIMITED Diagnosis/ICD:  Hypotension, unspecified-I95.9; Mitral valve disorder-I05.9 Indication:    Limited to assess for MS, Hypotension CPT Codes:     Echo Limited-23093; Color Doppler-70630; Doppler Limited-47102 Patient History: Pertinent History: Hypotension, hx of MV endocarditis, PAD, DM2 ESRD on Hd. Study Detail: The following Echo studies were performed: 2D, M-Mode, Doppler and               color flow. Technically challenging study due to poor acoustic               windows and L Breast implant. Unable to obtain suprasternal notch               view.  PHYSICIAN INTERPRETATION: Left Ventricle: Left ventricular systolic function is normal. There are no regional wall motion abnormalities. The left ventricular cavity size is normal. Left ventricular diastolic filling was not assessed. Left Atrium: The left atrium is moderate to severely dilated. Right Ventricle: The right ventricle is normal in size. There is normal right ventricular global systolic function. Right Atrium: The right atrium was not well visualized. Aortic Valve: The aortic valve was not assessed. Aortic valve regurgitation was not assessed. Mitral Valve: The mitral valve is abnormal. There is evidence of mild mitral valve stenosis. The doppler estimated mean and peak diastolic pressure gradients are 4.0 mmHg and 6.6 mmHg respectively. There is severe mitral annular calcification. There is no evidence of mitral valve regurgitation. Tricuspid Valve: The tricuspid valve was not assessed. Tricuspid regurgitation was not assessed. Pulmonic Valve: The pulmonic valve is not well visualized. The pulmonic valve regurgitation was not assessed. Pericardium: There is no pericardial effusion noted. Aorta: The aortic root was not assessed.  CONCLUSIONS:  1. Left ventricular systolic function is normal.  2. The left atrium is moderate to severely dilated.  3. There is severe mitral annular calcification. QUANTITATIVE DATA SUMMARY: LV DIASTOLIC FUNCTION:                        Normal  Ranges: MV Peak E:    1.23 m/s (0.7-1.2 m/s) MV Peak A:    1.12 m/s (0.42-0.7 m/s) E/A Ratio:    1.10     (1.0-2.2) MV lateral e' 0.04 m/s MV medial e'  0.07 m/s MITRAL VALVE:                      Normal Ranges: MV Vmax:    1.28 m/s (<=1.3m/s) MV peak P.6 mmHg (<5mmHg) MV mean P.0 mmHg (<48mmHg)  RIGHT VENTRICLE: RV Basal 2.97 cm  [id number] Herminio Lemus DO Electronically signed on 2024 at 3:50:21 PM  ** Final **     Transthoracic Echo (TTE) Limited    Result Date: 2024           [place] [address]            Phone [phone] TRANSTHORACIC ECHOCARDIOGRAM REPORT  Patient Name:      BASIA Gray Physician:    [id number] Herminio Lemus DO Study Date:        2024            Ordering Provider:    [id number] REBECA RESENDEZ MRN/PID:           [id number]            Fellow: Accession#:        [id number]        Nurse: Date of Birth/Age: 1950 / 73 years Sonographer:          Jennifer WALL Gender:            F                   Additional Staff: Height:            157.48 cm           Admit Date:           2024 Weight:            98.88 kg            Admission Status:     Inpatient - Routine BSA:               1.98 m2             Department Location: Blood Pressure: 59 /49 mmHg Study Type:    TRANSTHORACIC ECHO (TTE) LIMITED Diagnosis/ICD: Acute on chronic diastolic (congestive) heart failure                (CHF)-I50.33 Indication:    Acute on chronic diastolic congestive heart failure CPT Codes:     Echo Limited-37837; Color Doppler-98392; Doppler Limited-53846 Patient History: Pertinent History: PAF Peripheralvascular disease low blood pressure HTN Breast                    Cancer Mixed Hyperlipidemia HF CVA Hypotension DMII CAD CKD. Study Detail: The following Echo studies were performed: 2D, M-Mode, Doppler and                color flow. Technically challenging study due to body habitus,               patient lying in supine position, poor acoustic windows, prominent               lung artifact and Breast Prosthesis. Definity used as a contrast               agent for endocardial border definition. Unable to obtain               suprasternal notch view.  PHYSICIAN INTERPRETATION: Left Ventricle: Left ventricular systolic function is normal, with an estimated ejection fraction of 60-65%. There are no regional wall motion abnormalities. The left ventricular cavity size is normal. Left ventricular diastolic filling was indeterminate. Left Atrium: The left atrium is moderately dilated. Right Ventricle: The right ventricle is normal in size. There is normal right ventricular global systolic function. Right Atrium: The right atrium is normal in size. Aortic Valve: The aortic valve is trileaflet. There is no evidence of aortic valve regurgitation. The peak instantaneous gradient of the aortic valve is 5.3 mmHg. Mitral Valve: The mitral valve is normal in structure. There is severe mitral annular calcification. There is trace mitral valve regurgitation. Tricuspid Valve: The tricuspid valve is structurally normal. There is mild tricuspid regurgitation. Pulmonic Valve: The pulmonic valve is not well visualized. The pulmonic valve regurgitation was not well visualized. Pericardium: There is no pericardial effusion noted. Aorta: The aortic root is normal.  CONCLUSIONS:  1. Left ventricular systolic function is normal with a 60-65% estimated ejection fraction.  2. The left atrium is moderately dilated.  3. There is severe mitral annular calcification. QUANTITATIVE DATA SUMMARY: 2D MEASUREMENTS:                          Normal Ranges: LAs:           3.80 cm   (2.7-4.0cm) IVSd:          0.92 cm   (0.6-1.1cm) LVPWd:         1.09 cm   (0.6-1.1cm) LVIDd:         2.98 cm   (3.9-5.9cm) LVIDs:         2.12 cm LV Mass Index: 41.2 g/m2 LV % FS         28.9 % LA VOLUME:                               Normal Ranges: LA Vol A4C:        58.3 ml    (22+/-6mL/m2) LA Vol A2C:        60.9 ml LA Vol BP:         62.1 ml LA Vol Index A4C:  29.4ml/m2 LA Vol Index A2C:  30.7 ml/m2 LA Vol Index BP:   31.3 ml/m2 LA Area A4C:       20.5 cm2 LA Area A2C:       20.1 cm2 LA Major Axis A4C: 6.1 cm LA Major Axis A2C: 5.6 cm LA Volume Index:   29.3 ml/m2 LA Vol A4C:        53.1 ml LA Vol A2C:        58.4 ml LV SYSTOLIC FUNCTION BY 2D PLANIMETRY (MOD):                     Normal Ranges: EF-A4C View: 68.2 % (>=55%) EF-A2C View: 65.9 % EF-Biplane:  67.8 % AORTIC VALVE:                         Normal Ranges: AoV Vmax:      1.15 m/s (<=1.7m/s) AoV Peak P.3 mmHg (<20mmHg) LVOT Max Shimon:  1.11 m/s (<=1.1m/s) LVOT VTI:      16.20 cm LVOT Diameter: 1.90 cm  (1.8-2.4cm) AoV Area,Vmax: 2.74 cm2 (2.5-4.5cm2) PULMONIC VALVE:                         Normal Ranges: PV Accel Time: 63 msec  (>120ms) PV Max Shimon:    1.0 m/s  (0.6-0.9m/s) PV Max P.3 mmHg  [id number] Herminio Lemus DO Electronically signed on 2024 at 7:58:25 AM  ** Final **     Upper extremity venous duplex bilateral    Result Date: 2024           [place] [address]            Phone [phone]  Vascular Lab Report  [facility] US UPPER EXTREMITY VENOUS DUPLEX BILATERAL Patient Name:      BASIA Gray Physician:  [id number] Mini Busby MD, RPVI Study Date:        2024           Ordering Provider:  [id number] ERI BERMNA MRN/PID:           [id number]            Fellow: Accession#:        [id number]        Technologist:       Dara Parr RVT Date of Birth/Age: 1950 / 73 years Technologist 2: Gender:            F                   Encounter#:         [id number] Admission Status:  Inpatient           Location Performed: [facility]  Diagnosis/ICD: Right arm swelling-M79.89; Left arm  swelling-M79.89 CPT Codes:     61035 Peripheral venous duplex scan for DVT complete  CONCLUSIONS:  Right Upper Venous: No evidence of acute deep vein thrombus visualized in the right upper extremity. Internal jugular vein was visualized in segments due to IV lines and bandages. Left Upper Venous: No evidence of acute deep vein thrombus visualized in the left upper extremity. Subclavian stent is noted and appears patent. There is a known occluded dialysis access noted.  Additional Findings: Technically difficult exam due to IV lines, bandages, and patient's positioning.  Imaging & Doppler Findings:  Right               Compressible Thrombus        Flow Internal Jugular        Yes        None   Spontaneous/Phasic Subclavian              Yes        None Subclavian Proximal     Yes        None   Spontaneous/Phasic Subclavian Mid          Yes        None Subclavian Distal       Yes        None   Spontaneous/Phasic Axillary                Yes        None       Pulsatile Brachial                Yes        None Cephalic                Yes        None Basilic                 Yes        None  Left                Compress Thrombus        Flow Internal Jugular      Yes      None       Pulsatile Subclavian            Yes      None Subclavian Proximal   Yes      None       Pulsatile Subclavian Mid        Yes      None       Pulsatile Subclavian Distal     Yes      None       Pulsatile Axillary              Yes      None   Spontaneous/Phasic Brachial              Yes      None Cephalic              Yes      None Basilic               Yes      None  [id number] Mini Busby MD, RPVI Electronically signed by [id number] Mini Busby MD, RPVI on 1/25/2024 at 4:06:13 PM  ** Final **     ECG 12 lead    Result Date: 1/24/2024  Sinus tachycardia  with 1st degree AV block Right bundle branch block Possible Lateral infarct , age undetermined Abnormal ECG Confirmed by Yesika Nj ([id number]) on 1/24/2024 6:54:45 AM    XR tibia fibula right 2  views    Result Date: 1/22/2024  Interpreted By:  Real Mendieta, STUDY: XR TIBIA FIBULA RIGHT 2 VIEWS; 1/22/2024 2:15 pm   INDICATION: Signs/Symptoms:evaluate source of infection, osteomyelitis;   COMPARISON: None available.   ACCESSION NUMBER(S): [id number]   ORDERING CLINICIAN: BAN GIFFORD   TECHNIQUE: Views: AP and Lateral of the right tibia and fibula   FINDINGS: RESULT: There is no evidence for fracture or dislocation. Tricompartmental degenerative changes of the right knee. The tibia and fibula appear intact without bony erosion or evidence for osteomyelitis. No other bony or soft tissue abnormality is identified. No subcutaneous gas is noted.       No evidence for acute osseous abnormality. No bony erosion to suggest osteomyelitis.   Signed by: Real Mendieta 1/22/2024 3:15 PM Dictation workstation:   [id number]    XR ankle right 3+ views    Result Date: 1/22/2024  Interpreted By:  Real Mendieta, STUDY: XR ANKLE RIGHT 3+ VIEWS; 1/22/2024 2:15 pm   INDICATION: Signs/Symptoms:Evuluate source of infection, osteomyelitis;   COMPARISON: None available.   ACCESSION NUMBER(S): [id number]   ORDERING CLINICIAN: BAN GIFFORD   TECHNIQUE: Views: AP, Lateral, Oblique, right ankle   FINDINGS: RESULT: There is no evidence for fracture or dislocation. The ankle mortise is intact. Joint spaces appear adequately maintained. No bony erosion to suggest osteomyelitis. No bone lesion or soft tissue abnormality is identified. No subcutaneous gas is seen.       No evidence for acute osseous abnormality. No bony erosion to suggest osteomyelitis.   Signed by: Real Mendieta 1/22/2024 3:14 PM Dictation workstation:   [id number]    Transthoracic Echo (TTE) Complete    Result Date: 1/22/2024           [place] [address]            Phone [phone] TRANSTHORACIC ECHOCARDIOGRAM REPORT  Patient Name:      BASIA Gray Physician:   [id number] Fausto Grace  Date:        1/22/2024           Ordering Provider:   [id number] ERI BERMAN MRN/PID:           [id number]            Fellow: Accession#:        [id number]        Nurse: Date of Birth/Age: 1950 / 73 years Sonographer:         Tabatha Page RDCS Gender:            F                   Additional Staff: Height:            157.00 cm           Admit Date: Weight:            75.00 kg            Admission Status:    Inpatient - Routine BSA:               1.76 m2             Department Location: [facility] Blood Pressure: 88 /49 mmHg Study Type:    TRANSTHORACIC ECHO (TTE) COMPLETE Diagnosis/ICD: Chronic combined systolic (congestive) and diastolic (congestive)                heart failure (CHF)-I50.42; Sepsis, unspecified organism-A41.9 Indication:    heart failure,septic shock CPT Codes:     Echo Complete w Full Doppler-25442 Patient History: BMI:               Obese >30 Pertinent History: Sepsis, PVD, A-Fib, CVA, Cancer and HTN. breast                    prosthesis/ca, ESRD,anemia,septic shock,heart failure. Study Detail: The following Echo studies were performed: 2D, M-Mode, Doppler and               color flow. Technically challenging study due to prosthesis,               prominent lung artifact and body habitus.  PHYSICIAN INTERPRETATION: Left Ventricle: Left ventricular systolic function is normal, with an estimated ejection fraction of 70%. There are no regional wall motion abnormalities. The left ventricular cavity size is normal. Left ventricular diastolic filling was indeterminate. Left Atrium: The left atrium is moderately dilated. Right Ventricle: The right ventricle is normal in size. There is normal right ventricular global systolic function. Right Atrium: The right atrium is normal in size. Aortic Valve: The aortic valve is trileaflet. There is no evidence of aortic valve regurgitation. The peak instantaneous gradient of the aortic valve is  2.8 mmHg. Mitral Valve: The mitral valve is normal in structure. There is no evidence of mitral valve regurgitation. Tricuspid Valve: The tricuspid valve is structurally normal. There is trace tricuspid regurgitation. Pulmonic Valve: The pulmonic valve is not well visualized. The pulmonic valve regurgitation was not well visualized. Pericardium: There is no pericardial effusion noted. Aorta: The aortic root is normal.  CONCLUSIONS:  1. Left ventricular systolic function is normal with a 70% estimated ejection fraction.  2. The left atrium is moderately dilated.  3. Left ventricular diastolic filling indeterminate. QUANTITATIVE DATA SUMMARY: 2D MEASUREMENTS:                          Normal Ranges: LAs:           4.50 cm   (2.7-4.0cm) IVSd:          0.61 cm   (0.6-1.1cm) LVPWd:         0.88 cm   (0.6-1.1cm) LVIDd:         3.66 cm   (3.9-5.9cm) LV Mass Index: 41.9 g/m2 LV SYSTOLIC FUNCTION BY 2D PLANIMETRY (MOD):                     Normal Ranges: EF-A4C View: 68.3 % (>=55%) LV DIASTOLIC FUNCTION:                     Normal Ranges: MV Peak E: 1.19 m/s (0.7-1.2 m/s) MV Peak A: 0.90 m/s (0.42-0.7 m/s) E/A Ratio: 1.32     (1.0-2.2) MITRAL VALVE:                 Normal Ranges: MV DT: 192 msec (150-240msec) AORTIC VALVE:                         Normal Ranges: AoV Vmax:      0.84 m/s (<=1.7m/s) AoV Peak P.8 mmHg (<20mmHg) LVOT Max Shimon:  0.47 m/s (<=1.1m/s) LVOT Diameter: 1.90 cm  (1.8-2.4cm) AoV Area,Vmax: 1.57 cm2 (2.5-4.5cm2) TRICUSPID VALVE/RVSP:                   Normal Ranges: IVC Diam: 1.05 cm  [id number] Fausto Rowe  Electronically signed on 2024 at 2:53:04 PM  ** Final **     XR foot left 3+ views    Result Date: 2024  Interpreted By:  Real Mendieta, STUDY: XR FOOT LEFT 1-2 VIEWS; 2024 4:15 pm   INDICATION: Signs/Symptoms:osteomyelitis. Pain   COMPARISON: 2023   ACCESSION NUMBER(S): [id number]   ORDERING CLINICIAN: BAN GIFFORD   TECHNIQUE: Views:  AP, Lat, Oblique, left foot   FINDINGS:  RESULT: There is no evidence for fracture or dislocation. 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 Mild hammertoe deformities. Joint spaces appear adequately maintained. Soft tissue ulceration of the posterior aspect of the heel however no evidence for bony erosion to suggest osteomyelitis.       No evidence for acute fracture or acute bony erosion to suggest osteomyelitis. Chronic changes as described.   Signed by: Real Mendieta 1/21/2024 7:15 PM Dictation workstation:   [id number]    CT chest abdomen pelvis wo IV contrast    Result Date: 1/21/2024  Interpreted By:  Maria Garcia, STUDY: CT CHEST ABDOMEN PELVIS WO CONTRAST;  1/20/2024 11:42 pm   INDICATION: Mental status change. Elevated white blood cell count with infectious workup.   COMPARISON: 08/20/2023   ACCESSION NUMBER(S): [id number]   ORDERING CLINICIAN: BAN GIFFORD   TECHNIQUE: CT of the chest, abdomen and pelvis was performed with no oral or intravenous contrast administered. Sagittal and coronal reformations were completed by the technologist at the acquisition scanner.   All CT examinations are performed with 1 or more of the following dose reduction techniques: Automated exposure control, adjustment of mA and/or kv according to patient's size, or use of iterative reconstruction techniques.   FINDINGS: Please note that the study is limited without intravenous contrast.   CHEST: Bilateral pleural effusions are present with right effusion moderately small in size and with the left effusion moderately small in size as well. There is shift of the heart and mediastinum to the left of midline due to atelectasis of the left upper lobe. There is consolidation throughout left lower lobe with air bronchograms noted. On the right, there is compressive atelectasis seen posteriorly in the right lower lobe.   There is mild reactive mediastinal adenopathy seen at this time with mediastinal lymph nodes increased in size since prior  study. Several of these mediastinal nodes are at the upper limits of normal measuring 8 mm in short axis diameter.   No pericardial effusion is present. The cardiac size is normal with mitral annulus calcification.   There has been prior bilateral mastectomy with reconstruction of the left breast with implant placement. Surgical clips are visible within the left axilla. Stent grafts are visible within the left upper extremity and within the left innominate, subclavian as well as axillary veins.   A peripherally calcified 1.8 cm nodule arises from the lower pole of left thyroid lobe.   ABDOMEN:   LIVER: Unremarkable.   BILE DUCTS: No intrahepatic or extrahepatic biliary ductal dilatation is seen.   GALLBLADDER: Cholelithiasis is observed with some calcification of the gallbladder wall demonstrated as well. No gallbladder wall thickening or pericholecystic fluid is evident.   PANCREAS: Unremarkable   SPLEEN: Unremarkable   ADRENAL GLANDS: Unremarkable   KIDNEYS AND URETERS: Kidneys are small in size with extensive renal artery calcification demonstrated.   PELVIS:   BLADDER: Amos catheter is present within the decompressed urinary bladder.   REPRODUCTIVE ORGANS: Calcification of the arcuate arteries within the uterus is seen. There is no uterine enlargement or adnexal mass.   BOWEL: A rectal balloon is seen. There is no abnormal distention of the intestinal tract.   VESSELS: There is atherosclerosis of the thoracoabdominal aorta and iliac arteries with calcification in the walls of the celiac, splenic, hepatic, and mesenteric arteries.   PERITONEUM/RETROPERITONEUM/LYMPH NODES: There is a minimal amount of ascites seen about the liver and spleen with mild presacral edema noted.   ABDOMINAL WALL: There is anasarca involving the soft tissues of the abdomen and pelvis. Postoperative change from ventral hernia repair is seen.   BONES: Bilateral sacroiliitis is seen. There is lumbar dextroscoliosis. Chronic rotator cuff  tear is present bilaterally with osteoarthritis of both shoulders, right greater than left.       Chest 1.  Moderately small bilateral pleural effusions with left upper lobe atelectasis and compressive atelectasis seen posteriorly in right lower lobe. A left lower lobe pneumonia is identified. There is extensive airspace consolidation within left lower lobe with air bronchograms observed. Mild mediastinal reactive adenopathy is present as well.   Abdomen-Pelvis 1.  Cholelithiasis without evidence of cholecystitis. 2. Small amount of ascites with mild presacral edema and anasarca within the soft tissues of the abdominal wall. 3. Renal atrophy with extensive vascular calcifications.     MACRO: None   Signed by: Maria Garcia 1/21/2024 8:30 AM Dictation workstation:   [id number]    CT head wo IV contrast    Result Date: 1/21/2024  Interpreted By:  Maria Garcia, STUDY: CT HEAD WO IV CONTRAST 1/20/2024 11:42 pm   INDICATION: Signs/Symptoms:mental status changes   COMPARISON: 12/11/2023   ACCESSION NUMBER(S): [id number]   ORDERING CLINICIAN: BAN GIFFORD   TECHNIQUE: Unenhanced axial images of the brain are completed.   All CT examinations are performed with 1 or more of the following dose reduction techniques: Automated exposure control, adjustment of mA and/or kv according to patient's size, or use of iterative reconstruction techniques.   FINDINGS: Helical unenhanced axial images of the brain demonstrate a mild to moderate degree of ventricular enlargement with proportionate widening of the sulci and sylvian fissures. There is no midline shift, mass effect, extra-axial fluid collection, or acute intracranial hemorrhage. There is diminished density seen in the periventricular white matter indicating chronic microvascular ischemic disease. There is calcified plaque seen within the distal vertebral and internal carotid arteries bilaterally. No calvarial abnormality is seen.       Atrophy and chronic microvascular ischemic  disease without acute intracranial process.   Signed by: Maria Garcia 1/21/2024 8:09 AM Dictation workstation:   [id number]    XR chest 1 view    Result Date: 1/20/2024  Interpreted By:  Brendon Perez, STUDY: XR CHEST 1 VIEW; 1/20/2024 5:03 pm   INDICATION: CLINICAL INFORMATION: Signs/Symptoms:Insertion right IJ central line, also left thoracentesis.   COMPARISON: 01/19/2024 at 1338 hours   ACCESSION NUMBER(S): [id number]   ORDERING CLINICIAN: BOZENA ANTONIO   TECHNIQUE: Portable chest one view.   FINDINGS: The cardiac size is indeterminate in view of the AP projection. There is no change in the right-sided dual port central venous catheter. There is a new right internal jugular venous catheter present with the tip at approximately same level as the dual port central venous catheter, overlying the mid to lower right atrium. There is no evidence for pneumothorax. Patient has a history of left thoracentesis without evidence for pneumothorax on the left. There is bilateral basilar alveolar infiltrate and effusions. Left effusion is decreased compared to the study from 1 day earlier.   Surgical clips are identified within left chest wall. Endovascular stent is identified in the distribution of the left subclavian artery.       1. Status post right-sided central venous catheter placement as described above with the tip overlying the mid to caudal aspect of the right atrium. There is no evidence for pneumothorax. 2. No evidence for left-sided pneumothorax after left-sided thoracentesis. Decrease in the left effusion. 3. Bilateral basilar infiltrates and effusions are present. Follow-up to assure complete clearing is suggested.   MACRO: none   Signed by: Brendon Perez 1/20/2024 5:11 PM Dictation workstation:   [id number]    XR chest 1 view    Result Date: 1/19/2024  Interpreted By:  Real Mendieta, STUDY: XR CHEST 1 VIEW; 1/19/2024 1:38 pm   INDICATION: Signs/Symptoms:dyspnea.   COMPARISON: 12/26/2023   ACCESSION  NUMBER(S): [id number]   ORDERING CLINICIAN: EMELY GOLDSMITH   TECHNIQUE: 1 view of the chest was performed.   FINDINGS: There may be a minimal right pleural effusion. Slight prominence of the interstitium otherwise the right lung is clear. Improved appearance of the left lung with improved aeration of the right upper lobe. There is opacification of the left lower lobe possibly due to large pleural effusion which is similar to the prior study. No pneumothorax. Right-sided dual lumen central line catheter with tip at the proximal atrium. The cardiomediastinal silhouette is within normal limits. Left-sided subclavian stents are again noted.       Improved aeration and appearance of the left lung superiorly however there is a persistent large left pleural effusion and opacification of the left lower lobe.   Signed by: Real Mendieta 1/19/2024 1:44 PM Dictation workstation:   [id number]     Assessment/Plan   Septic shock-resolved  Left-sided pleural effusion   Left lower lobe pneumonia-treated  Proteus urinary tract infection-treated  History of MRSA endocarditis  History of C. 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

## 2024-02-16 NOTE — CARE PLAN
Problem: Respiratory  Goal: Patent airway maintained this shift  Outcome: Progressing  Goal: Tolerate pulmonary toileting this shift  Outcome: Progressing  Goal: Verbalize decreased shortness of breath this shift  Outcome: Progressing

## 2024-02-16 NOTE — NURSING NOTE
Attempted to start Tablo, machine needs heat cycle completed. Will start tablo when cycle completed

## 2024-02-16 NOTE — PROGRESS NOTES
Ayanna Gross is a 73 y.o. female on day 28 of admission presenting with Septic shock (CMS/Formerly [facility]).    Critical Care Medicine Progress Note    Admitted on:     1/19/2024  Length of Stay: 28 day(s)     Interval History     She still  off Levophed    awake today following commands no issues overnight.    Objective   Objective     Vitals:    02/16/24 0100   Weight: 105 kg (231 lb 11.3 oz)   Body mass index is 42.38 kg/m².        2/16/2024     1:00 AM 2/16/2024     2:00 AM 2/16/2024     3:00 AM 2/16/2024     4:00 AM 2/16/2024     5:00 AM 2/16/2024     6:00 AM 2/16/2024     7:00 AM   Vitals   Systolic 47 61 68 63 75 65    Diastolic 35 29 32 37 36 39    Heart Rate 98 97 95 93 93 88    Temp       36.2 °C (97.2 °F)   Resp 18 15 16 16 15 14    Weight (lb) 231.7         BMI 42.38 kg/m2         BSA (m2) 2.14 m2              Vent settings:       Intake/Output Summary (Last 24 hours) at 2/16/2024 1138  Last data filed at 2/16/2024 0400  Gross per 24 hour   Intake 340 ml   Output 1200 ml   Net -860 ml     Physical exam:  -----------------  Awake and oriented to place and person  Cardiovascular:      Rate and Rhythm: Normal rate. Rhythm irregular.   Pulmonary:      Effort: Pulmonary effort is normal.   Abdominal:      General: Abdomen is flat.   Musculoskeletal:         General: Deformity present. Normal range of motion.      Cervical back: Normal range of motion.      Comments: Abscence of left hand first finger   Skin:     General: Skin is warm.      Capillary Refill: Capillary refill takes less than 2 seconds.      Comments: Skin tear healing to L heal  Wound to bilateral feet and sacrum     Medications     Scheduled Medications:   apixaban, 2.5 mg, oral, BID  cefepime, 1 g, intravenous, q24h  doxycylcine, 100 mg, oral, Daily  epoetin di or biosimilar, 10,000 Units, intravenous, Every Mon/Wed/Fri  escitalopram, 10 mg, oral, Nightly  fludrocortisone, 0.1 mg, oral, TID  gabapentin, 100 mg, oral, TID  heparin, 2,000 Units,  intra-catheter, After Dialysis  heparin, 2,000 Units, intra-catheter, After Dialysis  heparin, 2,000 Units, intra-catheter, After Dialysis  heparin, 2,000 Units, intra-catheter, After Dialysis  insulin lispro, 0-10 Units, subcutaneous, TID with meals  ipratropium-albuteroL, 3 mL, nebulization, q6h while awake  lidocaine, 5 mL, infiltration, Once  midodrine, 15 mg, oral, TID with meals  nystatin, , Topical, BID  nystatin, , Topical, BID  pantoprazole, 40 mg, oral, Daily   Or  pantoprazole, 40 mg, intravenous, Daily  perflutren lipid microspheres, 0.5-10 mL of dilution, intravenous, Once in imaging  perflutren protein A microsphere, 0.5 mL, intravenous, Once in imaging  psyllium, 1 packet, oral, BID  sulfur hexafluoride microsphr, 2 mL, intravenous, Once in imaging  sulfur hexafluoride microsphr, 2 mL, intravenous, Once in imaging  vancomycin, 125 mg, oral, BID  vancomycin (Xellia) 1 g in 200 mL, 1 g, intravenous, Once  zinc oxide, 1 Application, Topical, BID       Continuous Medications:   norepinephrine, 0.01-3 mcg/kg/min, Last Rate: Stopped (02/12/24 1700)       PRN Medications:     Labs     Results from last 72 hours   Lab Units 02/15/24  0933   GLUCOSE mg/dL 219*   SODIUM mmol/L 132*   POTASSIUM mmol/L 3.4   CHLORIDE mmol/L 99   CO2 mmol/L 19*   BUN mg/dL 21   CREATININE mg/dL 2.50*   EGFR mL/min/1.73m*2 20*   CALCIUM mg/dL 8.3*                     Results from last 72 hours   Lab Units 02/15/24  0933   WBC AUTO x10*3/uL 12.6*   NRBC AUTO /100 WBCs 0.0   RBC AUTO x10*6/uL 2.59*   HEMOGLOBIN g/dL 8.0*   HEMATOCRIT % 26.1*   MCV fL 101*   MCH pg 30.9   MCHC g/dL 30.7*   RDW % 21.3*   PLATELETS AUTO x10*3/uL 192         Lab Results   Component Value Date    BLOODCULT No growth at 4 days -  FINAL REPORT 01/19/2024    BLOODCULT No growth at 4 days -  FINAL REPORT 01/19/2024    GRAMSTAIN (4+) Abundant Polymorphonuclear leukocytes (A) 02/10/2024    GRAMSTAIN (3+) Moderate Gram negative bacilli (A) 02/10/2024     Lab  Results   Component Value Date    URINECULTURE >100,000 Proteus mirabilis (A) 01/19/2024       Imaging and Diagnostic Studies     Lab/Radiology/Diagnostic Review:  Results for orders placed or performed during the hospital encounter of 01/19/24 (from the past 24 hour(s))   POCT GLUCOSE   Result Value Ref Range    POCT Glucose 206 (H) 74 - 99 mg/dL   POCT GLUCOSE   Result Value Ref Range    POCT Glucose 191 (H) 74 - 99 mg/dL   POCT GLUCOSE   Result Value Ref Range    POCT Glucose 188 (H) 74 - 99 mg/dL   Vancomycin   Result Value Ref Range    Vancomycin 20.0 10.0 - 20.0 ug/mL   POCT GLUCOSE   Result Value Ref Range    POCT Glucose 161 (H) 74 - 99 mg/dL     Upper extremity venous duplex bilateral    Result Date: 1/25/2024           [place] [address]            Phone [phone]  Vascular Lab Report  VASC US UPPER EXTREMITY VENOUS DUPLEX BILATERAL Patient Name:      BASIA Gray Physician:  [id number] Mini Busby MD, RPVI Study Date:        1/25/2024           Ordering Provider:  [id number] ERI BERMAN MRN/PID:           [id number]            Fellow: Accession#:        [id number]        Technologist:       Dara Parr RVRICHIE Date of Birth/Age: 1950 / 73 years Technologist 2: Gender:            F                   Encounter#:         [id number] Admission Status:  Inpatient           Location Performed: [facility]  Diagnosis/ICD: Right arm swelling-M79.89; Left arm swelling-M79.89 CPT Codes:     41977 Peripheral venous duplex scan for DVT complete  CONCLUSIONS:  Right Upper Venous: No evidence of acute deep vein thrombus visualized in the right upper extremity. Internal jugular vein was visualized in segments due to IV lines and bandages. Left Upper Venous: No evidence of acute deep vein thrombus visualized in the left upper extremity. Subclavian stent is noted and appears patent. There  is a known occluded dialysis access noted.  Additional Findings: Technically difficult exam due to IV lines, bandages, and patient's positioning.  Imaging & Doppler Findings:  Right               Compressible Thrombus        Flow Internal Jugular        Yes        None   Spontaneous/Phasic Subclavian              Yes        None Subclavian Proximal     Yes        None   Spontaneous/Phasic Subclavian Mid          Yes        None Subclavian Distal       Yes        None   Spontaneous/Phasic Axillary                Yes        None       Pulsatile Brachial                Yes        None Cephalic                Yes        None Basilic                 Yes        None  Left                Compress Thrombus        Flow Internal Jugular      Yes      None       Pulsatile Subclavian            Yes      None Subclavian Proximal   Yes      None       Pulsatile Subclavian Mid        Yes      None       Pulsatile Subclavian Distal     Yes      None       Pulsatile Axillary              Yes      None   Spontaneous/Phasic Brachial              Yes      None Cephalic              Yes      None Basilic               Yes      None  [id number] Mini Busby MD, RPVI Electronically signed by [id number] Mini Busby MD, RPVI on 1/25/2024 at 4:06:13 PM  ** Final **     ECG 12 lead    Result Date: 1/24/2024  Sinus tachycardia  with 1st degree AV block Right bundle branch block Possible Lateral infarct , age undetermined Abnormal ECG Confirmed by Yesika Nj ([id number]) on 1/24/2024 6:54:45 AM    XR tibia fibula right 2 views    Result Date: 1/22/2024  Interpreted By:  Real Mendieta, STUDY: XR TIBIA FIBULA RIGHT 2 VIEWS; 1/22/2024 2:15 pm   INDICATION: Signs/Symptoms:evaluate source of infection, osteomyelitis;   COMPARISON: None available.   ACCESSION NUMBER(S): [id number]   ORDERING CLINICIAN: BAN GIFFORD   TECHNIQUE: Views: AP and Lateral of the right tibia and fibula   FINDINGS: RESULT: There is no evidence for fracture or dislocation.  Tricompartmental degenerative changes of the right knee. The tibia and fibula appear intact without bony erosion or evidence for osteomyelitis. No other bony or soft tissue abnormality is identified. No subcutaneous gas is noted.       No evidence for acute osseous abnormality. No bony erosion to suggest osteomyelitis.   Signed by: Real Mendieta 1/22/2024 3:15 PM Dictation workstation:   [id number]    XR ankle right 3+ views    Result Date: 1/22/2024  Interpreted By:  Real Mendieta, STUDY: XR ANKLE RIGHT 3+ VIEWS; 1/22/2024 2:15 pm   INDICATION: Signs/Symptoms:Evuluate source of infection, osteomyelitis;   COMPARISON: None available.   ACCESSION NUMBER(S): [id number]   ORDERING CLINICIAN: BAN GIFFORD   TECHNIQUE: Views: AP, Lateral, Oblique, right ankle   FINDINGS: RESULT: There is no evidence for fracture or dislocation. The ankle mortise is intact. Joint spaces appear adequately maintained. No bony erosion to suggest osteomyelitis. No bone lesion or soft tissue abnormality is identified. No subcutaneous gas is seen.       No evidence for acute osseous abnormality. No bony erosion to suggest osteomyelitis.   Signed by: Real Mendieta 1/22/2024 3:14 PM Dictation workstation:   [id number]    Transthoracic Echo (TTE) Complete    Result Date: 1/22/2024           [place] [address]            Phone [phone] TRANSTHORACIC ECHOCARDIOGRAM REPORT  Patient Name:      BASIATRAVIS Gray Physician:   [id number] Fausto Rowe DO Study Date:        1/22/2024           Ordering Provider:   [id number] ERI BERMAN MRN/PID:           [id number]            Fellow: Accession#:        [id number]        Nurse: Date of Birth/Age: 1950 / 73 years Sonographer:         Tabatha Page RDCS Gender:            F                   Additional Staff: Height:            157.00 cm           Admit Date: Weight:             75.00 kg            Admission Status:    Inpatient - Routine BSA:               1.76 m2             Department Location: [facility] ICU Blood Pressure: 88 /49 mmHg Study Type:    TRANSTHORACIC ECHO (TTE) COMPLETE Diagnosis/ICD: Chronic combined systolic (congestive) and diastolic (congestive)                heart failure (CHF)-I50.42; Sepsis, unspecified organism-A41.9 Indication:    heart failure,septic shock CPT Codes:     Echo Complete w Full Doppler-24906 Patient History: BMI:               Obese >30 Pertinent History: Sepsis, PVD, A-Fib, CVA, Cancer and HTN. breast                    prosthesis/ca, ESRD,anemia,septic shock,heart failure. Study Detail: The following Echo studies were performed: 2D, M-Mode, Doppler and               color flow. Technically challenging study due to prosthesis,               prominent lung artifact and body habitus.  PHYSICIAN INTERPRETATION: Left Ventricle: Left ventricular systolic function is normal, with an estimated ejection fraction of 70%. There are no regional wall motion abnormalities. The left ventricular cavity size is normal. Left ventricular diastolic filling was indeterminate. Left Atrium: The left atrium is moderately dilated. Right Ventricle: The right ventricle is normal in size. There is normal right ventricular global systolic function. Right Atrium: The right atrium is normal in size. Aortic Valve: The aortic valve is trileaflet. There is no evidence of aortic valve regurgitation. The peak instantaneous gradient of the aortic valve is 2.8 mmHg. Mitral Valve: The mitral valve is normal in structure. There is no evidence of mitral valve regurgitation. Tricuspid Valve: The tricuspid valve is structurally normal. There is trace tricuspid regurgitation. Pulmonic Valve: The pulmonic valve is not well visualized. The pulmonic valve regurgitation was not well visualized. Pericardium: There is no pericardial effusion noted. Aorta: The aortic root is normal.  CONCLUSIONS:   1. Left ventricular systolic function is normal with a 70% estimated ejection fraction.  2. The left atrium is moderately dilated.  3. Left ventricular diastolic filling indeterminate. QUANTITATIVE DATA SUMMARY: 2D MEASUREMENTS:                          Normal Ranges: LAs:           4.50 cm   (2.7-4.0cm) IVSd:          0.61 cm   (0.6-1.1cm) LVPWd:         0.88 cm   (0.6-1.1cm) LVIDd:         3.66 cm   (3.9-5.9cm) LV Mass Index: 41.9 g/m2 LV SYSTOLIC FUNCTION BY 2D PLANIMETRY (MOD):                     Normal Ranges: EF-A4C View: 68.3 % (>=55%) LV DIASTOLIC FUNCTION:                     Normal Ranges: MV Peak E: 1.19 m/s (0.7-1.2 m/s) MV Peak A: 0.90 m/s (0.42-0.7 m/s) E/A Ratio: 1.32     (1.0-2.2) MITRAL VALVE:                 Normal Ranges: MV DT: 192 msec (150-240msec) AORTIC VALVE:                         Normal Ranges: AoV Vmax:      0.84 m/s (<=1.7m/s) AoV Peak P.8 mmHg (<20mmHg) LVOT Max Shimon:  0.47 m/s (<=1.1m/s) LVOT Diameter: 1.90 cm  (1.8-2.4cm) AoV Area,Vmax: 1.57 cm2 (2.5-4.5cm2) TRICUSPID VALVE/RVSP:                   Normal Ranges: IVC Diam: 1.05 cm  [id number] [facility]  Electronically signed on 2024 at 2:53:04 PM  ** Final **     XR foot left 3+ views    Result Date: 2024  Interpreted By:  Real Mendieta, STUDY: XR FOOT LEFT 1-2 VIEWS; 2024 4:15 pm   INDICATION: Signs/Symptoms:osteomyelitis. Pain   COMPARISON: 2023   ACCESSION NUMBER(S): [id number]   ORDERING CLINICIAN: BAN GIFFORD   TECHNIQUE: Views:  AP, Lat, Oblique, left foot   FINDINGS: RESULT: There is no evidence for fracture or dislocation. 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 Mild hammertoe deformities. Joint spaces appear adequately maintained. Soft tissue ulceration of the posterior aspect of the heel however no evidence for bony erosion to suggest osteomyelitis.       No evidence for acute fracture or acute bony erosion to suggest osteomyelitis. Chronic changes as described.    Signed by: Real Mendieta 1/21/2024 7:15 PM Dictation workstation:   [id number]    CT chest abdomen pelvis wo IV contrast    Result Date: 1/21/2024  Interpreted By:  Maria Garcia, STUDY: CT CHEST ABDOMEN PELVIS WO CONTRAST;  1/20/2024 11:42 pm   INDICATION: Mental status change. Elevated white blood cell count with infectious workup.   COMPARISON: 08/20/2023   ACCESSION NUMBER(S): [id number]   ORDERING CLINICIAN: BAN GIFFORD   TECHNIQUE: CT of the chest, abdomen and pelvis was performed with no oral or intravenous contrast administered. Sagittal and coronal reformations were completed by the technologist at the acquisition scanner.   All CT examinations are performed with 1 or more of the following dose reduction techniques: Automated exposure control, adjustment of mA and/or kv according to patient's size, or use of iterative reconstruction techniques.   FINDINGS: Please note that the study is limited without intravenous contrast.   CHEST: Bilateral pleural effusions are present with right effusion moderately small in size and with the left effusion moderately small in size as well. There is shift of the heart and mediastinum to the left of midline due to atelectasis of the left upper lobe. There is consolidation throughout left lower lobe with air bronchograms noted. On the right, there is compressive atelectasis seen posteriorly in the right lower lobe.   There is mild reactive mediastinal adenopathy seen at this time with mediastinal lymph nodes increased in size since prior study. Several of these mediastinal nodes are at the upper limits of normal measuring 8 mm in short axis diameter.   No pericardial effusion is present. The cardiac size is normal with mitral annulus calcification.   There has been prior bilateral mastectomy with reconstruction of the left breast with implant placement. Surgical clips are visible within the left axilla. Stent grafts are visible within the left upper extremity and within  the left innominate, subclavian as well as axillary veins.   A peripherally calcified 1.8 cm nodule arises from the lower pole of left thyroid lobe.   ABDOMEN:   LIVER: Unremarkable.   BILE DUCTS: No intrahepatic or extrahepatic biliary ductal dilatation is seen.   GALLBLADDER: Cholelithiasis is observed with some calcification of the gallbladder wall demonstrated as well. No gallbladder wall thickening or pericholecystic fluid is evident.   PANCREAS: Unremarkable   SPLEEN: Unremarkable   ADRENAL GLANDS: Unremarkable   KIDNEYS AND URETERS: Kidneys are small in size with extensive renal artery calcification demonstrated.   PELVIS:   BLADDER: Amos catheter is present within the decompressed urinary bladder.   REPRODUCTIVE ORGANS: Calcification of the arcuate arteries within the uterus is seen. There is no uterine enlargement or adnexal mass.   BOWEL: A rectal balloon is seen. There is no abnormal distention of the intestinal tract.   VESSELS: There is atherosclerosis of the thoracoabdominal aorta and iliac arteries with calcification in the walls of the celiac, splenic, hepatic, and mesenteric arteries.   PERITONEUM/RETROPERITONEUM/LYMPH NODES: There is a minimal amount of ascites seen about the liver and spleen with mild presacral edema noted.   ABDOMINAL WALL: There is anasarca involving the soft tissues of the abdomen and pelvis. Postoperative change from ventral hernia repair is seen.   BONES: Bilateral sacroiliitis is seen. There is lumbar dextroscoliosis. Chronic rotator cuff tear is present bilaterally with osteoarthritis of both shoulders, right greater than left.       Chest 1.  Moderately small bilateral pleural effusions with left upper lobe atelectasis and compressive atelectasis seen posteriorly in right lower lobe. A left lower lobe pneumonia is identified. There is extensive airspace consolidation within left lower lobe with air bronchograms observed. Mild mediastinal reactive adenopathy is present as  well.   Abdomen-Pelvis 1.  Cholelithiasis without evidence of cholecystitis. 2. Small amount of ascites with mild presacral edema and anasarca within the soft tissues of the abdominal wall. 3. Renal atrophy with extensive vascular calcifications.     MACRO: None   Signed by: Maria Garcia 1/21/2024 8:30 AM Dictation workstation:   [id number]    CT head wo IV contrast    Result Date: 1/21/2024  Interpreted By:  Maria Garcia, STUDY: CT HEAD WO IV CONTRAST 1/20/2024 11:42 pm   INDICATION: Signs/Symptoms:mental status changes   COMPARISON: 12/11/2023   ACCESSION NUMBER(S): [id number]   ORDERING CLINICIAN: BAN GIFFORD   TECHNIQUE: Unenhanced axial images of the brain are completed.   All CT examinations are performed with 1 or more of the following dose reduction techniques: Automated exposure control, adjustment of mA and/or kv according to patient's size, or use of iterative reconstruction techniques.   FINDINGS: Helical unenhanced axial images of the brain demonstrate a mild to moderate degree of ventricular enlargement with proportionate widening of the sulci and sylvian fissures. There is no midline shift, mass effect, extra-axial fluid collection, or acute intracranial hemorrhage. There is diminished density seen in the periventricular white matter indicating chronic microvascular ischemic disease. There is calcified plaque seen within the distal vertebral and internal carotid arteries bilaterally. No calvarial abnormality is seen.       Atrophy and chronic microvascular ischemic disease without acute intracranial process.   Signed by: Maria Garcia 1/21/2024 8:09 AM Dictation workstation:   [id number]    XR chest 1 view    Result Date: 1/20/2024  Interpreted By:  Brendon Perez, STUDY: XR CHEST 1 VIEW; 1/20/2024 5:03 pm   INDICATION: CLINICAL INFORMATION: Signs/Symptoms:Insertion right IJ central line, also left thoracentesis.   COMPARISON: 01/19/2024 at 1338 hours   ACCESSION NUMBER(S): [id number]   ORDERING  CLINICIAN: BOZENA ANTONIO   TECHNIQUE: Portable chest one view.   FINDINGS: The cardiac size is indeterminate in view of the AP projection. There is no change in the right-sided dual port central venous catheter. There is a new right internal jugular venous catheter present with the tip at approximately same level as the dual port central venous catheter, overlying the mid to lower right atrium. There is no evidence for pneumothorax. Patient has a history of left thoracentesis without evidence for pneumothorax on the left. There is bilateral basilar alveolar infiltrate and effusions. Left effusion is decreased compared to the study from 1 day earlier.   Surgical clips are identified within left chest wall. Endovascular stent is identified in the distribution of the left subclavian artery.       1. Status post right-sided central venous catheter placement as described above with the tip overlying the mid to caudal aspect of the right atrium. There is no evidence for pneumothorax. 2. No evidence for left-sided pneumothorax after left-sided thoracentesis. Decrease in the left effusion. 3. Bilateral basilar infiltrates and effusions are present. Follow-up to assure complete clearing is suggested.   MACRO: none   Signed by: Brendon Perez 1/20/2024 5:11 PM Dictation workstation:   [id number]    XR chest 1 view    Result Date: 1/19/2024  Interpreted By:  Real Mendieta, STUDY: XR CHEST 1 VIEW; 1/19/2024 1:38 pm   INDICATION: Signs/Symptoms:dyspnea.   COMPARISON: 12/26/2023   ACCESSION NUMBER(S): [id number]   ORDERING CLINICIAN: EMELY GOLDSMITH   TECHNIQUE: 1 view of the chest was performed.   FINDINGS: There may be a minimal right pleural effusion. Slight prominence of the interstitium otherwise the right lung is clear. Improved appearance of the left lung with improved aeration of the right upper lobe. There is opacification of the left lower lobe possibly due to large pleural effusion which is similar to the prior  study. No pneumothorax. Right-sided dual lumen central line catheter with tip at the proximal atrium. The cardiomediastinal silhouette is within normal limits. Left-sided subclavian stents are again noted.       Improved aeration and appearance of the left lung superiorly however there is a persistent large left pleural effusion and opacification of the left lower lobe.   Signed by: Real Mendieta 1/19/2024 1:44 PM Dictation workstation:   [id number]         Assessment / Plan       PROBLEM LIST:  - End stage heart failure with preserved EF - on vasopressors  - End stage renal disease on HD  - Cardiogenic shock - will continue to wean down levo her blood pressure measured 1 does not reflect of the actual blood pressure as the patient is fully awake despite readings of systolic in the 40s and 50s arterial line was also inaccurate due to vasculopathy  - Acute metabolic encephalopathy  - Failure to wean from vasopressors  - Goals of care discussion    MANAGEMENT PLAN:  - Start weaning levo and assess mental status  - received HD today   -Already on Fluorinef tid  - Palliative care consult  - Cardiology consultation - patient's daughter declined a RHC  - Peer to peer done with insurance, NOT approved  - Family will update us with goals of care  - Cont Doxy and Vanc PO. Meropenem pseudomonas in swab culture from right heal   Appreciate ID consult.  - Routine ICU care  - PT/OT  Overall prognosis poor due to chronic medical problems including end-stage renal disease heart failure patient is bedridden with decubitus ulcers  I have personally interviewed and examined the patient.  I have personally verified elements of the exam listed above. Interval changes or irregularities are as noted.  I have personally and independently reviewed laboratory, radiographic and procedural data.  I have personally reviewed the problem list above and concur. Changes, if any, are noted.  I have personally reviewed the plan list above and  concur. Changes, if any, are noted.    The patient has high probability of compromise including but not limited to organ system failure, mechanical respiratory and circulatory support, cardiac arrest and death. I have discussed this in detail with the family / caregivers.    No change in plan overall    Tarek Gharibeh MD

## 2024-02-16 NOTE — PROGRESS NOTES
Patient not medically clear. Patient being dialyzed on this day. Patient will need SNF, TCC asked physician for therapy orders. At this time there is not a safe discharge plan in place. Will follow.     **PATIENT DOES NOT HAVE A SAFE DISCHARGE PLAN      Shauna Jensen RN

## 2024-02-16 NOTE — CARE PLAN
The patient's goals for the shift include Visit with family.    The clinical goals for the shift include pt will maintain orientation while pressures are baseline low; physicians aware we are assessing mentation and her pressures run very low    Problem: Pain  Goal: My pain/discomfort is manageable  Outcome: Progressing     Problem: Safety  Goal: Patient will be injury free during hospitalization  Outcome: Progressing  Goal: I will remain free of falls  Outcome: Progressing     Problem: Daily Care  Goal: Daily care needs are met  Outcome: Progressing     Problem: Psychosocial Needs  Goal: Demonstrates ability to cope with hospitalization/illness  Outcome: Progressing  Goal: Collaborate with me, my family, and caregiver to identify my specific goals  Outcome: Progressing     Problem: Discharge Barriers  Goal: My discharge needs are met  Outcome: Progressing     Problem: Fall/Injury  Goal: Not fall by end of shift  Outcome: Progressing  Goal: Be free from injury by end of the shift  Outcome: Progressing  Goal: Verbalize understanding of personal risk factors for fall in the hospital  Outcome: Progressing  Goal: Verbalize understanding of risk factor reduction measures to prevent injury from fall in the home  Outcome: Progressing  Goal: Pace activities to prevent fatigue by end of the shift  Outcome: Progressing     Problem: Skin  Goal: Decreased wound size/increased tissue granulation at next dressing change  Outcome: Progressing  Goal: Participates in plan/prevention/treatment measures  Outcome: Progressing  Goal: Prevent/manage excess moisture  Outcome: Progressing  Goal: Prevent/minimize sheer/friction injuries  Outcome: Progressing  Goal: Promote/optimize nutrition  Outcome: Progressing  Goal: Promote skin healing  Outcome: Progressing     Problem: Pain  Goal: Takes deep breaths with improved pain control throughout the shift  Outcome: Progressing  Goal: Turns in bed with improved pain control throughout the  shift  Outcome: Progressing  Goal: Walks with improved pain control throughout the shift  Outcome: Progressing  Goal: Performs ADL's with improved pain control throughout shift  Outcome: Progressing     Problem: Respiratory  Goal: Clear secretions with interventions this shift  Outcome: Progressing  Goal: Minimize anxiety/maximize coping throughout shift  Outcome: Progressing  Goal: Minimal/no exertional discomfort or dyspnea this shift  Outcome: Progressing  Goal: Patent airway maintained this shift  Outcome: Progressing  Goal: Tolerate pulmonary toileting this shift  Outcome: Progressing  Goal: Verbalize decreased shortness of breath this shift  Outcome: Progressing  Goal: Increase self care and/or family involvement in next 24 hours  Outcome: Progressing     Problem: Diabetes  Goal: Maintain electrolyte levels within acceptable range throughout shift  Outcome: Progressing  Goal: Maintain glucose levels >70mg/dl to <250mg/dl throughout shift  Outcome: Progressing  Goal: No changes in neurological exam by end of shift  Outcome: Progressing  Goal: Learn about and adhere to nutrition recommendations by end of shift  Outcome: Progressing  Goal: Vital signs within normal range for age by end of shift  Outcome: Progressing  Goal: Increase self care and/or family involovement by end of shift  Outcome: Progressing  Goal: Receive DSME education by end of shift  Outcome: Progressing     Problem: Discharge Planning  Goal: Discharge to home or other facility with appropriate resources  Outcome: Progressing     Problem: Chronic Conditions and Co-morbidities  Goal: Patient's chronic conditions and co-morbidity symptoms are monitored and maintained or improved  Outcome: Progressing

## 2024-02-16 NOTE — NURSING NOTE
Upon rounding right chest dialysis catheter with current CHG dressing dry and intact. Right upper arm dual lumen PICC with soiled dressing. Both lumens with brisk blood return and flush easily. Routine dressing change done. Site without redness, edema or bleeding noted. PICC out at 0. Pt tolerated well.

## 2024-02-16 NOTE — CARE PLAN
The patient's goals for the shift include Visit with family.    The clinical goals for the shift include Pt will tolerate lack of pressor support    Over the shift, the patient did not make progress toward the following goals. Barriers to progression include . Recommendations to address these barriers include .

## 2024-02-17 NOTE — PROGRESS NOTES
Ayanna Gross is a 73 y.o. female on day 29 of admission presenting with Septic shock (CMS/[facility]).    Critical Care Medicine Progress Note    Admitted on:     1/19/2024  Length of Stay: 29 day(s)     Interval History     She still  off Levophed  for the fourth day in a row this is a 68  awake today following commands no issues overnight.    Objective   Objective     Vitals:    02/17/24 0700   Weight: 98.8 kg (217 lb 13 oz)   Body mass index is 39.84 kg/m².        2/17/2024     7:00 AM 2/17/2024     8:00 AM 2/17/2024     9:00 AM 2/17/2024    10:00 AM 2/17/2024    11:00 AM 2/17/2024    12:00 PM 2/17/2024     1:00 PM   Vitals   Systolic 77 58 63 66 75 70 63   Diastolic 64 33 36 44 43 35 49   Heart Rate 88 84 85 91 89 87 95   Temp  36.4 °C (97.5 °F)    36.4 °C (97.5 °F)    Resp 20 14 13 16 17 17 18   Weight (lb) 217.81         BMI 39.84 kg/m2         BSA (m2) 2.08 m2              Vent settings:       Intake/Output Summary (Last 24 hours) at 2/17/2024 1327  Last data filed at 2/17/2024 1200  Gross per 24 hour   Intake 670 ml   Output 3000 ml   Net -2330 ml     Physical exam:  -----------------  Awake and oriented to place and person  Cardiovascular:      Rate and Rhythm: Normal rate. Rhythm irregular.   Pulmonary:      Effort: Pulmonary effort is normal.   Abdominal:      General: Abdomen is flat.   Musculoskeletal:         General: Deformity present. Normal range of motion.      Cervical back: Normal range of motion.      Comments: Abscence of left hand first finger   Skin:     General: Skin is warm.      Capillary Refill: Capillary refill takes less than 2 seconds.      Comments: Skin tear healing to L heal  Wound to bilateral feet and sacrum     Medications     Scheduled Medications:   apixaban, 2.5 mg, oral, BID  cefepime, 1 g, intravenous, q24h  doxycylcine, 100 mg, oral, Daily  epoetin di or biosimilar, 10,000 Units, intravenous, Every Mon/Wed/Fri  escitalopram, 10 mg, oral, Nightly  fludrocortisone, 0.1 mg, oral,  TID  gabapentin, 100 mg, oral, TID  heparin, 2,000 Units, intra-catheter, After Dialysis  heparin, 2,000 Units, intra-catheter, After Dialysis  heparin, 2,000 Units, intra-catheter, After Dialysis  heparin, 2,000 Units, intra-catheter, After Dialysis  insulin lispro, 0-10 Units, subcutaneous, TID with meals  ipratropium-albuteroL, 3 mL, nebulization, q6h while awake  lidocaine, 5 mL, infiltration, Once  midodrine, 15 mg, oral, TID with meals  nystatin, , Topical, BID  nystatin, , Topical, BID  pantoprazole, 40 mg, oral, Daily   Or  pantoprazole, 40 mg, intravenous, Daily  perflutren lipid microspheres, 0.5-10 mL of dilution, intravenous, Once in imaging  perflutren protein A microsphere, 0.5 mL, intravenous, Once in imaging  psyllium, 1 packet, oral, BID  sulfur hexafluoride microsphr, 2 mL, intravenous, Once in imaging  sulfur hexafluoride microsphr, 2 mL, intravenous, Once in imaging  vancomycin, 125 mg, oral, BID  zinc oxide, 1 Application, Topical, BID       Continuous Medications:   norepinephrine, 0.01-3 mcg/kg/min, Last Rate: Stopped (02/12/24 1700)       PRN Medications:     Labs     Results from last 72 hours   Lab Units 02/17/24  0642 02/15/24  0933   GLUCOSE mg/dL 207* 219*   SODIUM mmol/L 137 132*   POTASSIUM mmol/L 3.2* 3.4   CHLORIDE mmol/L 101 99   CO2 mmol/L 22* 19*   BUN mg/dL 21 21   CREATININE mg/dL 2.30* 2.50*   EGFR mL/min/1.73m*2 22* 20*   CALCIUM mg/dL 8.7 8.3*   MAGNESIUM mg/dL 1.60  --                      Results from last 72 hours   Lab Units 02/17/24  0642 02/15/24  0933   WBC AUTO x10*3/uL 13.2* 12.6*   NRBC AUTO /100 WBCs 0.0 0.0   RBC AUTO x10*6/uL 2.57* 2.59*   HEMOGLOBIN g/dL 8.1* 8.0*   HEMATOCRIT % 26.2* 26.1*   MCV fL 102* 101*   MCH pg 31.5 30.9   MCHC g/dL 30.9* 30.7*   RDW % 21.5* 21.3*   PLATELETS AUTO x10*3/uL 155 192         Lab Results   Component Value Date    BLOODCULT No growth at 4 days -  FINAL REPORT 01/19/2024    BLOODCULT No growth at 4 days -  FINAL REPORT  01/19/2024    GRAMSTAIN (4+) Abundant Polymorphonuclear leukocytes (A) 02/10/2024    GRAMSTAIN (3+) Moderate Gram negative bacilli (A) 02/10/2024     Lab Results   Component Value Date    URINECULTURE >100,000 Proteus mirabilis (A) 01/19/2024       Imaging and Diagnostic Studies     Lab/Radiology/Diagnostic Review:  Results for orders placed or performed during the hospital encounter of 01/19/24 (from the past 24 hour(s))   POCT GLUCOSE   Result Value Ref Range    POCT Glucose 218 (H) 74 - 99 mg/dL   CBC   Result Value Ref Range    WBC 13.2 (H) 4.4 - 11.3 x10*3/uL    nRBC 0.0 0.0 - 0.0 /100 WBCs    RBC 2.57 (L) 4.00 - 5.20 x10*6/uL    Hemoglobin 8.1 (L) 12.0 - 16.0 g/dL    Hematocrit 26.2 (L) 36.0 - 46.0 %     (H) 80 - 100 fL    MCH 31.5 26.0 - 34.0 pg    MCHC 30.9 (L) 32.0 - 36.0 g/dL    RDW 21.5 (H) 11.5 - 14.5 %    Platelets 155 150 - 450 x10*3/uL   Basic metabolic panel   Result Value Ref Range    Glucose 207 (H) 65 - 99 mg/dL    Sodium 137 133 - 145 mmol/L    Potassium 3.2 (L) 3.4 - 5.1 mmol/L    Chloride 101 97 - 107 mmol/L    Bicarbonate 22 (L) 24 - 31 mmol/L    Urea Nitrogen 21 8 - 25 mg/dL    Creatinine 2.30 (H) 0.40 - 1.60 mg/dL    eGFR 22 (L) >60 mL/min/1.73m*2    Calcium 8.7 8.5 - 10.4 mg/dL    Anion Gap 14 <=19 mmol/L   Magnesium   Result Value Ref Range    Magnesium 1.60 1.60 - 3.10 mg/dL   POCT GLUCOSE   Result Value Ref Range    POCT Glucose 174 (H) 74 - 99 mg/dL     Upper extremity venous duplex bilateral    Result Date: 1/25/2024           [place] [id number]            Phone [phone]  Vascular Lab Report  [facility] US UPPER EXTREMITY VENOUS DUPLEX BILATERAL Patient Name:      BASIA Gray Physician:  [id number] Mini Busby MD, RPVI Study Date:        1/25/2024           Ordering Provider:  [id number] ERI BERMAN MRN/PID:           [id number]            Fellow: Accession#:         [id number]        Technologist:       Dara Parr RVT Date of Birth/Age: 1950 / 73 years Technologist 2: Gender:            F                   Encounter#:         [id number] Admission Status:  Inpatient           Location Performed: [facility]  Diagnosis/ICD: Right arm swelling-M79.89; Left arm swelling-M79.89 CPT Codes:     57692 Peripheral venous duplex scan for DVT complete  CONCLUSIONS:  Right Upper Venous: No evidence of acute deep vein thrombus visualized in the right upper extremity. Internal jugular vein was visualized in segments due to IV lines and bandages. Left Upper Venous: No evidence of acute deep vein thrombus visualized in the left upper extremity. Subclavian stent is noted and appears patent. There is a known occluded dialysis access noted.  Additional Findings: Technically difficult exam due to IV lines, bandages, and patient's positioning.  Imaging & Doppler Findings:  Right               Compressible Thrombus        Flow Internal Jugular        Yes        None   Spontaneous/Phasic Subclavian              Yes        None Subclavian Proximal     Yes        None   Spontaneous/Phasic Subclavian Mid          Yes        None Subclavian Distal       Yes        None   Spontaneous/Phasic Axillary                Yes        None       Pulsatile Brachial                Yes        None Cephalic                Yes        None Basilic                 Yes        None  Left                Compress Thrombus        Flow Internal Jugular      Yes      None       Pulsatile Subclavian            Yes      None Subclavian Proximal   Yes      None       Pulsatile Subclavian Mid        Yes      None       Pulsatile Subclavian Distal     Yes      None       Pulsatile Axillary              Yes      None   Spontaneous/Phasic Brachial              Yes      None Cephalic              Yes      None Basilic               Yes      None  [id number] Mini Busby MD, RPVI Electronically signed by [id number] Morse  Kehinde GRUBER, RPVI on 1/25/2024 at 4:06:13 PM  ** Final **     ECG 12 lead    Result Date: 1/24/2024  Sinus tachycardia  with 1st degree AV block Right bundle branch block Possible Lateral infarct , age undetermined Abnormal ECG Confirmed by Yesika Nj ([id number]) on 1/24/2024 6:54:45 AM    XR tibia fibula right 2 views    Result Date: 1/22/2024  Interpreted By:  Real Mendieta, STUDY: XR TIBIA FIBULA RIGHT 2 VIEWS; 1/22/2024 2:15 pm   INDICATION: Signs/Symptoms:evaluate source of infection, osteomyelitis;   COMPARISON: None available.   ACCESSION NUMBER(S): [id number]   ORDERING CLINICIAN: BAN GIFFORD   TECHNIQUE: Views: AP and Lateral of the right tibia and fibula   FINDINGS: RESULT: There is no evidence for fracture or dislocation. Tricompartmental degenerative changes of the right knee. The tibia and fibula appear intact without bony erosion or evidence for osteomyelitis. No other bony or soft tissue abnormality is identified. No subcutaneous gas is noted.       No evidence for acute osseous abnormality. No bony erosion to suggest osteomyelitis.   Signed by: Real Mendieta 1/22/2024 3:15 PM Dictation workstation:   [id number]    XR ankle right 3+ views    Result Date: 1/22/2024  Interpreted By:  Real Mendieta, STUDY: XR ANKLE RIGHT 3+ VIEWS; 1/22/2024 2:15 pm   INDICATION: Signs/Symptoms:Evuluate source of infection, osteomyelitis;   COMPARISON: None available.   ACCESSION NUMBER(S): [id number]   ORDERING CLINICIAN: BAN GIFFORD   TECHNIQUE: Views: AP, Lateral, Oblique, right ankle   FINDINGS: RESULT: There is no evidence for fracture or dislocation. The ankle mortise is intact. Joint spaces appear adequately maintained. No bony erosion to suggest osteomyelitis. No bone lesion or soft tissue abnormality is identified. No subcutaneous gas is seen.       No evidence for acute osseous abnormality. No bony erosion to suggest osteomyelitis.   Signed by: Real Mendieta 1/22/2024 3:14 PM Dictation  workstation:   [id number]    Transthoracic Echo (TTE) Complete    Result Date: 1/22/2024           [place] [address]            Phone [phone] TRANSTHORACIC ECHOCARDIOGRAM REPORT  Patient Name:      BASIA VEGA      Isaac Physician:   [id number] Fausto Rowe DO Study Date:        1/22/2024           Ordering Provider:   [id number] ERI BERMAN MRN/PID:           [id number]            Fellow: Accession#:        [id number]        Nurse: Date of Birth/Age: 1950 / 73 years Sonographer:         Tabatha Page RDCS Gender:            F                   Additional Staff: Height:            157.00 cm           Admit Date: Weight:            75.00 kg            Admission Status:    Inpatient - Routine BSA:               1.76 m2             Department Location: [facility] Blood Pressure: 88 /49 mmHg Study Type:    TRANSTHORACIC ECHO (TTE) COMPLETE Diagnosis/ICD: Chronic combined systolic (congestive) and diastolic (congestive)                heart failure (CHF)-I50.42; Sepsis, unspecified organism-A41.9 Indication:    heart failure,septic shock CPT Codes:     Echo Complete w Full Doppler-34197 Patient History: BMI:               Obese >30 Pertinent History: Sepsis, PVD, A-Fib, CVA, Cancer and HTN. breast                    prosthesis/ca, ESRD,anemia,septic shock,heart failure. Study Detail: The following Echo studies were performed: 2D, M-Mode, Doppler and               color flow. Technically challenging study due to prosthesis,               prominent lung artifact and body habitus.  PHYSICIAN INTERPRETATION: Left Ventricle: Left ventricular systolic function is normal, with an estimated ejection fraction of 70%. There are no regional wall motion abnormalities. The left ventricular cavity size is normal. Left ventricular diastolic filling was indeterminate. Left Atrium: The left atrium is moderately dilated.  Right Ventricle: The right ventricle is normal in size. There is normal right ventricular global systolic function. Right Atrium: The right atrium is normal in size. Aortic Valve: The aortic valve is trileaflet. There is no evidence of aortic valve regurgitation. The peak instantaneous gradient of the aortic valve is 2.8 mmHg. Mitral Valve: The mitral valve is normal in structure. There is no evidence of mitral valve regurgitation. Tricuspid Valve: The tricuspid valve is structurally normal. There is trace tricuspid regurgitation. Pulmonic Valve: The pulmonic valve is not well visualized. The pulmonic valve regurgitation was not well visualized. Pericardium: There is no pericardial effusion noted. Aorta: The aortic root is normal.  CONCLUSIONS:  1. Left ventricular systolic function is normal with a 70% estimated ejection fraction.  2. The left atrium is moderately dilated.  3. Left ventricular diastolic filling indeterminate. QUANTITATIVE DATA SUMMARY: 2D MEASUREMENTS:                          Normal Ranges: LAs:           4.50 cm   (2.7-4.0cm) IVSd:          0.61 cm   (0.6-1.1cm) LVPWd:         0.88 cm   (0.6-1.1cm) LVIDd:         3.66 cm   (3.9-5.9cm) LV Mass Index: 41.9 g/m2 LV SYSTOLIC FUNCTION BY 2D PLANIMETRY (MOD):                     Normal Ranges: EF-A4C View: 68.3 % (>=55%) LV DIASTOLIC FUNCTION:                     Normal Ranges: MV Peak E: 1.19 m/s (0.7-1.2 m/s) MV Peak A: 0.90 m/s (0.42-0.7 m/s) E/A Ratio: 1.32     (1.0-2.2) MITRAL VALVE:                 Normal Ranges: MV DT: 192 msec (150-240msec) AORTIC VALVE:                         Normal Ranges: AoV Vmax:      0.84 m/s (<=1.7m/s) AoV Peak P.8 mmHg (<20mmHg) LVOT Max Shimon:  0.47 m/s (<=1.1m/s) LVOT Diameter: 1.90 cm  (1.8-2.4cm) AoV Area,Vmax: 1.57 cm2 (2.5-4.5cm2) TRICUSPID VALVE/RVSP:                   Normal Ranges: IVC Diam: 1.05 cm  [id number] Fausto Rowe DO Electronically signed on 2024 at 2:53:04 PM  ** Final **     XR foot left 3+  views    Result Date: 1/21/2024  Interpreted By:  Real Mendieta, STUDY: XR FOOT LEFT 1-2 VIEWS; 1/21/2024 4:15 pm   INDICATION: Signs/Symptoms:osteomyelitis. Pain   COMPARISON: 12/07/2023   ACCESSION NUMBER(S): [id number]   ORDERING CLINICIAN: BAN GIFFORD   TECHNIQUE: Views:  AP, Lat, Oblique, left foot   FINDINGS: RESULT: There is no evidence for fracture or dislocation. 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 Mild hammertoe deformities. Joint spaces appear adequately maintained. Soft tissue ulceration of the posterior aspect of the heel however no evidence for bony erosion to suggest osteomyelitis.       No evidence for acute fracture or acute bony erosion to suggest osteomyelitis. Chronic changes as described.   Signed by: Real Mendieta 1/21/2024 7:15 PM Dictation workstation:   [id number]    CT chest abdomen pelvis wo IV contrast    Result Date: 1/21/2024  Interpreted By:  Maria Garcia, STUDY: CT CHEST ABDOMEN PELVIS WO CONTRAST;  1/20/2024 11:42 pm   INDICATION: Mental status change. Elevated white blood cell count with infectious workup.   COMPARISON: 08/20/2023   ACCESSION NUMBER(S): [id number]   ORDERING CLINICIAN: BAN GIFFORD   TECHNIQUE: CT of the chest, abdomen and pelvis was performed with no oral or intravenous contrast administered. Sagittal and coronal reformations were completed by the technologist at the acquisition scanner.   All CT examinations are performed with 1 or more of the following dose reduction techniques: Automated exposure control, adjustment of mA and/or kv according to patient's size, or use of iterative reconstruction techniques.   FINDINGS: Please note that the study is limited without intravenous contrast.   CHEST: Bilateral pleural effusions are present with right effusion moderately small in size and with the left effusion moderately small in size as well. There is shift of the heart and mediastinum to the left of midline due to  atelectasis of the left upper lobe. There is consolidation throughout left lower lobe with air bronchograms noted. On the right, there is compressive atelectasis seen posteriorly in the right lower lobe.   There is mild reactive mediastinal adenopathy seen at this time with mediastinal lymph nodes increased in size since prior study. Several of these mediastinal nodes are at the upper limits of normal measuring 8 mm in short axis diameter.   No pericardial effusion is present. The cardiac size is normal with mitral annulus calcification.   There has been prior bilateral mastectomy with reconstruction of the left breast with implant placement. Surgical clips are visible within the left axilla. Stent grafts are visible within the left upper extremity and within the left innominate, subclavian as well as axillary veins.   A peripherally calcified 1.8 cm nodule arises from the lower pole of left thyroid lobe.   ABDOMEN:   LIVER: Unremarkable.   BILE DUCTS: No intrahepatic or extrahepatic biliary ductal dilatation is seen.   GALLBLADDER: Cholelithiasis is observed with some calcification of the gallbladder wall demonstrated as well. No gallbladder wall thickening or pericholecystic fluid is evident.   PANCREAS: Unremarkable   SPLEEN: Unremarkable   ADRENAL GLANDS: Unremarkable   KIDNEYS AND URETERS: Kidneys are small in size with extensive renal artery calcification demonstrated.   PELVIS:   BLADDER: Amos catheter is present within the decompressed urinary bladder.   REPRODUCTIVE ORGANS: Calcification of the arcuate arteries within the uterus is seen. There is no uterine enlargement or adnexal mass.   BOWEL: A rectal balloon is seen. There is no abnormal distention of the intestinal tract.   VESSELS: There is atherosclerosis of the thoracoabdominal aorta and iliac arteries with calcification in the walls of the celiac, splenic, hepatic, and mesenteric arteries.   PERITONEUM/RETROPERITONEUM/LYMPH NODES: There is a  minimal amount of ascites seen about the liver and spleen with mild presacral edema noted.   ABDOMINAL WALL: There is anasarca involving the soft tissues of the abdomen and pelvis. Postoperative change from ventral hernia repair is seen.   BONES: Bilateral sacroiliitis is seen. There is lumbar dextroscoliosis. Chronic rotator cuff tear is present bilaterally with osteoarthritis of both shoulders, right greater than left.       Chest 1.  Moderately small bilateral pleural effusions with left upper lobe atelectasis and compressive atelectasis seen posteriorly in right lower lobe. A left lower lobe pneumonia is identified. There is extensive airspace consolidation within left lower lobe with air bronchograms observed. Mild mediastinal reactive adenopathy is present as well.   Abdomen-Pelvis 1.  Cholelithiasis without evidence of cholecystitis. 2. Small amount of ascites with mild presacral edema and anasarca within the soft tissues of the abdominal wall. 3. Renal atrophy with extensive vascular calcifications.     MACRO: None   Signed by: Maria Garcia 1/21/2024 8:30 AM Dictation workstation:   [id number]    CT head wo IV contrast    Result Date: 1/21/2024  Interpreted By:  Maria Garcia, STUDY: CT HEAD WO IV CONTRAST 1/20/2024 11:42 pm   INDICATION: Signs/Symptoms:mental status changes   COMPARISON: 12/11/2023   ACCESSION NUMBER(S): [id number]   ORDERING CLINICIAN: BAN GIFFORD   TECHNIQUE: Unenhanced axial images of the brain are completed.   All CT examinations are performed with 1 or more of the following dose reduction techniques: Automated exposure control, adjustment of mA and/or kv according to patient's size, or use of iterative reconstruction techniques.   FINDINGS: Helical unenhanced axial images of the brain demonstrate a mild to moderate degree of ventricular enlargement with proportionate widening of the sulci and sylvian fissures. There is no midline shift, mass effect, extra-axial fluid collection, or  acute intracranial hemorrhage. There is diminished density seen in the periventricular white matter indicating chronic microvascular ischemic disease. There is calcified plaque seen within the distal vertebral and internal carotid arteries bilaterally. No calvarial abnormality is seen.       Atrophy and chronic microvascular ischemic disease without acute intracranial process.   Signed by: Maria Garcia 1/21/2024 8:09 AM Dictation workstation:   [id number]    XR chest 1 view    Result Date: 1/20/2024  Interpreted By:  Brendon Perez, STUDY: XR CHEST 1 VIEW; 1/20/2024 5:03 pm   INDICATION: CLINICAL INFORMATION: Signs/Symptoms:Insertion right IJ central line, also left thoracentesis.   COMPARISON: 01/19/2024 at 1338 hours   ACCESSION NUMBER(S): [id number]   ORDERING CLINICIAN: BOZENA ANTONIO   TECHNIQUE: Portable chest one view.   FINDINGS: The cardiac size is indeterminate in view of the AP projection. There is no change in the right-sided dual port central venous catheter. There is a new right internal jugular venous catheter present with the tip at approximately same level as the dual port central venous catheter, overlying the mid to lower right atrium. There is no evidence for pneumothorax. Patient has a history of left thoracentesis without evidence for pneumothorax on the left. There is bilateral basilar alveolar infiltrate and effusions. Left effusion is decreased compared to the study from 1 day earlier.   Surgical clips are identified within left chest wall. Endovascular stent is identified in the distribution of the left subclavian artery.       1. Status post right-sided central venous catheter placement as described above with the tip overlying the mid to caudal aspect of the right atrium. There is no evidence for pneumothorax. 2. No evidence for left-sided pneumothorax after left-sided thoracentesis. Decrease in the left effusion. 3. Bilateral basilar infiltrates and effusions are present. Follow-up to  assure complete clearing is suggested.   MACRO: none   Signed by: Brendon Perez 1/20/2024 5:11 PM Dictation workstation:   [id number]    XR chest 1 view    Result Date: 1/19/2024  Interpreted By:  Real Mendieta, STUDY: XR CHEST 1 VIEW; 1/19/2024 1:38 pm   INDICATION: Signs/Symptoms:dyspnea.   COMPARISON: 12/26/2023   ACCESSION NUMBER(S): [id number]   ORDERING CLINICIAN: EMELY GOLDSMITH   TECHNIQUE: 1 view of the chest was performed.   FINDINGS: There may be a minimal right pleural effusion. Slight prominence of the interstitium otherwise the right lung is clear. Improved appearance of the left lung with improved aeration of the right upper lobe. There is opacification of the left lower lobe possibly due to large pleural effusion which is similar to the prior study. No pneumothorax. Right-sided dual lumen central line catheter with tip at the proximal atrium. The cardiomediastinal silhouette is within normal limits. Left-sided subclavian stents are again noted.       Improved aeration and appearance of the left lung superiorly however there is a persistent large left pleural effusion and opacification of the left lower lobe.   Signed by: Real Mendieta 1/19/2024 1:44 PM Dictation workstation:   [id number]         Assessment / Plan       PROBLEM LIST:  - End stage heart failure with preserved EF - on vasopressors  - End stage renal disease on HD  - Cardiogenic shock - will continue to wean down levo her blood pressure measured 1 does not reflect of the actual blood pressure as the patient is fully awake despite readings of systolic in the 40s and 50s arterial line was also inaccurate due to vasculopathy  - Acute metabolic encephalopathy  - Failure to wean from vasopressors  - Goals of care discussion    MANAGEMENT PLAN:  - received HD yesterday    -Already on Fluorinef tid  - Palliative care consult  - Cardiology consultation - patient's daughter declined a RHC  - Peer to peer done with insurance, NOT  approved  - Family will update us with goals of care  - Cont Doxy and Vanc PO. Meropenem pseudomonas in swab culture from right heal   Appreciate ID consult.  - Routine ICU care  - PT/OT  Overall prognosis poor due to chronic medical problems including end-stage renal disease heart failure patient is bedridden with decubitus ulcers  I have personally interviewed and examined the patient.  I have personally verified elements of the exam listed above. Interval changes or irregularities are as noted.  I have personally and independently reviewed laboratory, radiographic and procedural data.  I have personally reviewed the problem list above and concur. Changes, if any, are noted.  I have personally reviewed the plan list above and concur. Changes, if any, are noted.    The patient has high probability of compromise including but not limited to organ system failure, mechanical respiratory and circulatory support, cardiac arrest and death. I have discussed this in detail with the family / caregivers.    No change in plan overall    Tarek Gharibeh MD

## 2024-02-17 NOTE — CARE PLAN
The patient's goals for the shift include Visit with family.    The clinical goals for the shift include Pt will maintain orientation while bp is at her baseline low    Over the shift, the patient did not make progress toward the following goals. Barriers to progression include . Recommendations to address these barriers include   Problem: Pain  Goal: My pain/discomfort is manageable  Outcome: Progressing     Problem: Safety  Goal: Patient will be injury free during hospitalization  Outcome: Progressing  Goal: I will remain free of falls  Outcome: Progressing     Problem: Daily Care  Goal: Daily care needs are met  Outcome: Progressing     Problem: Psychosocial Needs  Goal: Demonstrates ability to cope with hospitalization/illness  Outcome: Progressing  Goal: Collaborate with me, my family, and caregiver to identify my specific goals  Outcome: Progressing     Problem: Discharge Barriers  Goal: My discharge needs are met  Outcome: Progressing     Problem: Fall/Injury  Goal: Not fall by end of shift  Outcome: Progressing  Goal: Be free from injury by end of the shift  Outcome: Progressing  Goal: Verbalize understanding of personal risk factors for fall in the hospital  Outcome: Progressing  Goal: Verbalize understanding of risk factor reduction measures to prevent injury from fall in the home  Outcome: Progressing  Goal: Pace activities to prevent fatigue by end of the shift  Outcome: Progressing     Problem: Skin  Goal: Decreased wound size/increased tissue granulation at next dressing change  Outcome: Progressing  Goal: Participates in plan/prevention/treatment measures  Outcome: Progressing  Goal: Prevent/manage excess moisture  Outcome: Progressing  Goal: Prevent/minimize sheer/friction injuries  Outcome: Progressing  Goal: Promote/optimize nutrition  Outcome: Progressing  Goal: Promote skin healing  Outcome: Progressing     Problem: Pain  Goal: Takes deep breaths with improved pain control throughout the  shift  Outcome: Progressing  Goal: Turns in bed with improved pain control throughout the shift  Outcome: Progressing  Goal: Walks with improved pain control throughout the shift  Outcome: Progressing  Goal: Performs ADL's with improved pain control throughout shift  Outcome: Progressing     Problem: Respiratory  Goal: Clear secretions with interventions this shift  Outcome: Progressing  Goal: Minimize anxiety/maximize coping throughout shift  Outcome: Progressing  Goal: Minimal/no exertional discomfort or dyspnea this shift  Outcome: Progressing  Goal: Patent airway maintained this shift  Outcome: Progressing  Goal: Tolerate pulmonary toileting this shift  Outcome: Progressing  Goal: Verbalize decreased shortness of breath this shift  Outcome: Progressing  Goal: Increase self care and/or family involvement in next 24 hours  Outcome: Progressing     Problem: Diabetes  Goal: Maintain electrolyte levels within acceptable range throughout shift  Outcome: Progressing  Goal: Maintain glucose levels >70mg/dl to <250mg/dl throughout shift  Outcome: Progressing  Goal: No changes in neurological exam by end of shift  Outcome: Progressing  Goal: Learn about and adhere to nutrition recommendations by end of shift  Outcome: Progressing  Goal: Vital signs within normal range for age by end of shift  Outcome: Progressing  Goal: Increase self care and/or family involovement by end of shift  Outcome: Progressing  Goal: Receive DSME education by end of shift  Outcome: Progressing     Problem: Discharge Planning  Goal: Discharge to home or other facility with appropriate resources  Outcome: Progressing     Problem: Chronic Conditions and Co-morbidities  Goal: Patient's chronic conditions and co-morbidity symptoms are monitored and maintained or improved  Outcome: Progressing   .

## 2024-02-17 NOTE — CARE PLAN
Problem: Pain  Goal: My pain/discomfort is manageable  2/17/2024 0741 by Rashad Lopez RN  Outcome: Progressing  2/17/2024 0738 by Rashad Lopez RN  Outcome: Progressing     Problem: Safety  Goal: Patient will be injury free during hospitalization  2/17/2024 0741 by Rashad Lopez RN  Outcome: Progressing  2/17/2024 0738 by Rashad Lopez RN  Outcome: Progressing  Goal: I will remain free of falls  2/17/2024 0741 by Rashad Lopez RN  Outcome: Progressing  2/17/2024 0738 by Rashad Lopez RN  Outcome: Progressing     Problem: Daily Care  Goal: Daily care needs are met  2/17/2024 0741 by Rashad Lopez RN  Outcome: Progressing  2/17/2024 0738 by Rashad Lopez RN  Outcome: Progressing     Problem: Psychosocial Needs  Goal: Demonstrates ability to cope with hospitalization/illness  2/17/2024 0741 by Rashad Lopez RN  Outcome: Progressing  2/17/2024 0738 by Rashad Lopez RN  Outcome: Progressing  Goal: Collaborate with me, my family, and caregiver to identify my specific goals  2/17/2024 0741 by Rashad Lopez RN  Outcome: Progressing  2/17/2024 0738 by Rashad Lopez RN  Outcome: Progressing     Problem: Discharge Barriers  Goal: My discharge needs are met  2/17/2024 0741 by Rashad Lopez RN  Outcome: Progressing  2/17/2024 0738 by Rashad Lopez RN  Outcome: Progressing     Problem: Fall/Injury  Goal: Not fall by end of shift  2/17/2024 0741 by Rashad Lopez RN  Outcome: Progressing  2/17/2024 0738 by Rashad Lopez RN  Outcome: Progressing  Goal: Be free from injury by end of the shift  2/17/2024 0741 by Rashad Lopez RN  Outcome: Progressing  2/17/2024 0738 by Rashad Lopez RN  Outcome: Progressing  Goal: Verbalize understanding of personal risk factors for fall in the hospital  2/17/2024 0741 by Rashad Lopez RN  Outcome: Progressing  2/17/2024 0738 by Rashad MORGAN  FLAQUITO Lopez  Outcome: Progressing  Goal: Verbalize understanding of risk factor reduction measures to prevent injury from fall in the home  2/17/2024 0741 by Rashad Lopez RN  Outcome: Progressing  2/17/2024 0738 by Rashad Lopez RN  Outcome: Progressing  Goal: Pace activities to prevent fatigue by end of the shift  2/17/2024 0741 by Rashad Lopez RN  Outcome: Progressing  2/17/2024 0738 by Rashad Lopez RN  Outcome: Progressing     Problem: Skin  Goal: Decreased wound size/increased tissue granulation at next dressing change  2/17/2024 0741 by Rashad Lopez RN  Outcome: Progressing  2/17/2024 0738 by Rashad Lopez RN  Outcome: Progressing  Goal: Participates in plan/prevention/treatment measures  2/17/2024 0741 by Rashad Lopez RN  Outcome: Progressing  2/17/2024 0738 by Rashad Lopez RN  Outcome: Progressing  Goal: Prevent/manage excess moisture  2/17/2024 0741 by Rashad Lopez RN  Outcome: Progressing  2/17/2024 0738 by Rashad Lopez RN  Outcome: Progressing  Goal: Prevent/minimize sheer/friction injuries  2/17/2024 0741 by Rashad Lopez RN  Outcome: Progressing  2/17/2024 0738 by Rashad Lopez RN  Outcome: Progressing  Goal: Promote/optimize nutrition  2/17/2024 0741 by Rashad Lopez RN  Outcome: Progressing  2/17/2024 0738 by Rashad Lopez RN  Outcome: Progressing  Goal: Promote skin healing  2/17/2024 0741 by Rashad Lopez RN  Outcome: Progressing  2/17/2024 0738 by Rashad Lopez RN  Outcome: Progressing     Problem: Pain  Goal: Takes deep breaths with improved pain control throughout the shift  2/17/2024 0741 by Rashad Lopez RN  Outcome: Progressing  2/17/2024 0738 by Rashad Lopez RN  Outcome: Progressing  Goal: Turns in bed with improved pain control throughout the shift  2/17/2024 0741 by Rashad Lopez RN  Outcome: Progressing  2/17/2024 0738 by Rashad  EDDIE Lopez RN  Outcome: Progressing  Goal: Walks with improved pain control throughout the shift  2/17/2024 0741 by Rashad Lopez RN  Outcome: Progressing  2/17/2024 0738 by Rashad Lopez RN  Outcome: Progressing  Goal: Performs ADL's with improved pain control throughout shift  2/17/2024 0741 by Rashad Lopez RN  Outcome: Progressing  2/17/2024 0738 by Rashad Lopez RN  Outcome: Progressing     Problem: Respiratory  Goal: Clear secretions with interventions this shift  2/17/2024 0741 by Rashad Lopez RN  Outcome: Progressing  2/17/2024 0738 by Rashad Lopez RN  Outcome: Progressing  Goal: Minimize anxiety/maximize coping throughout shift  2/17/2024 0741 by Rashad Lopez RN  Outcome: Progressing  2/17/2024 0738 by Rahsad Lopez RN  Outcome: Progressing  Goal: Minimal/no exertional discomfort or dyspnea this shift  2/17/2024 0741 by Rashad Lopez RN  Outcome: Progressing  2/17/2024 0738 by Rashad Lopez RN  Outcome: Progressing  Goal: Patent airway maintained this shift  2/17/2024 0741 by Rashad Lopez RN  Outcome: Progressing  2/17/2024 0738 by Rashad Lopez RN  Outcome: Progressing  Goal: Tolerate pulmonary toileting this shift  2/17/2024 0741 by Rashad Lopez RN  Outcome: Progressing  2/17/2024 0738 by Rashad Lopez RN  Outcome: Progressing  Goal: Verbalize decreased shortness of breath this shift  2/17/2024 0741 by Rashad Lopez RN  Outcome: Progressing  2/17/2024 0738 by Rashad Lopez RN  Outcome: Progressing  Goal: Increase self care and/or family involvement in next 24 hours  2/17/2024 0741 by Rashad Lopez RN  Outcome: Progressing  2/17/2024 0738 by Rashad Lopez RN  Outcome: Progressing     Problem: Diabetes  Goal: Maintain electrolyte levels within acceptable range throughout shift  2/17/2024 0741 by Rashad Lopez RN  Outcome: Progressing  2/17/2024 0738  by Rashad Lopez RN  Outcome: Progressing  Goal: Maintain glucose levels >70mg/dl to <250mg/dl throughout shift  2/17/2024 0741 by Rashad Lopez RN  Outcome: Progressing  2/17/2024 0738 by Rashad Lopez RN  Outcome: Progressing  Goal: No changes in neurological exam by end of shift  2/17/2024 0741 by Rashad Lopez RN  Outcome: Progressing  2/17/2024 0738 by Rashad Lopez RN  Outcome: Progressing  Goal: Learn about and adhere to nutrition recommendations by end of shift  2/17/2024 0741 by Rashad Lopez RN  Outcome: Progressing  2/17/2024 0738 by Rashad Lopez RN  Outcome: Progressing  Goal: Vital signs within normal range for age by end of shift  2/17/2024 0741 by Rashad Lopez RN  Outcome: Progressing  2/17/2024 0738 by Rashad Lopez RN  Outcome: Progressing  Goal: Increase self care and/or family involovement by end of shift  2/17/2024 0741 by Rashad Lopez RN  Outcome: Progressing  2/17/2024 0738 by Rashad Lopez RN  Outcome: Progressing  Goal: Receive DSME education by end of shift  2/17/2024 0741 by Rashad Lopez RN  Outcome: Progressing  2/17/2024 0738 by Rashad Lopez RN  Outcome: Progressing     Problem: Discharge Planning  Goal: Discharge to home or other facility with appropriate resources  2/17/2024 0741 by Rashad Lopez RN  Outcome: Progressing  2/17/2024 0738 by Rashad Lopez RN  Outcome: Progressing     Problem: Chronic Conditions and Co-morbidities  Goal: Patient's chronic conditions and co-morbidity symptoms are monitored and maintained or improved  2/17/2024 0741 by Rashad Lopez RN  Outcome: Progressing  2/17/2024 0738 by Rashad Lopez RN  Outcome: Progressing   The patient's goals for the shift include Visit with family.    The clinical goals for the shift include Pt will maintain orientation while bp is at her baseline low    Over the shift, the patient did not  make progress toward the following goals. Barriers to progression include . Recommendations to address these barriers include .

## 2024-02-17 NOTE — PROGRESS NOTES
Ayanna Gross is a 73 y.o. female on day 29 of admission presenting with Septic shock (CMS/HCC).    Subjective   Interval History:       Afebrile, no chills  Remains off pressors  Sister present  No chest pain or shortness of breath  No nausea vomiting or diarrhea     Review of Systems   All other systems reviewed and are negative.      Objective   Range of Vitals (last 24 hours)  Heart Rate:  []   Temp:  [36 °C (96.8 °F)-36.6 °C (97.9 °F)]   Resp:  [13-23]   BP: (39-79)/(27-64)   Weight:  [98.8 kg (217 lb 13 oz)-108 kg (238 lb 5.1 oz)]   SpO2:  [82 %-98 %]   Daily Weight  02/17/24 : 98.8 kg (217 lb 13 oz)    Body mass index is 39.84 kg/m².    Physical Exam  Constitutional:       Appearance: Awake, alert  HENT:      Head: Normocephalic and atraumatic.      Nose: Nose normal.   Eyes:      Extraocular Movements: Extraocular movements intact.      Conjunctiva/sclera: Conjunctivae normal.   Cardiovascular:      Heart sounds: Normal heart sounds, S1 normal and S2 normal.   Pulmonary:      Breath sounds: Decreased breath sounds present.   Abdominal:      General: Bowel sounds are normal.      Palpations: Abdomen is soft.   Musculoskeletal:      Cervical back: Normal range of motion and neck supple.   Skin:     Comments: Stage III sacral ulcer, bilateral posterior heel eschar -photos examined  Neurological:      Mental Status: She is awake, alert    Antibiotics  aspirin tablet 325 mg  acetaminophen (Tylenol) tablet 650 mg  cefepime (Maxipime) 1 g in dextrose 5 % 50 mL IV  sodium chloride 0.9 % bolus 2,229 mL  apixaban (Eliquis) tablet 2.5 mg  escitalopram (Lexapro) tablet 10 mg  febuxostat (Uloric) tablet 40 mg  fenofibrate (Triglide) tablet 160 mg  gabapentin (Neurontin) capsule 100 mg  midodrine (Proamatine) tablet 15 mg  nystatin (Mycostatin) 100,000 unit/gram powder  oxyCODONE-acetaminophen (Percocet) 5-325 mg per tablet 1 tablet  simvastatin (Zocor) tablet 20 mg  piperacillin-tazobactam-dextrose (Zosyn) IV 2.25  g  vancomycin (Vancocin) capsule 125 mg  oxygen (O2) therapy  dextrose 50 % injection 25 g  glucagon (Glucagen) injection 1 mg  dextrose 10 % in water (D10W) infusion  insulin lispro (HumaLOG) injection 0-10 Units  nystatin (Mycostatin) 100,000 unit/gram powder 1 Application  vancomycin-diluent combo no.1 (Xellia) IVPB 1,750 mg  piperacillin-tazobactam-dextrose (Zosyn) IV 2.25 g  sodium chloride 0.9 % bolus 500 mL  norepinephrine (Levophed) 8 mg in dextrose 5% 250 mL (0.032 mg/mL) infusion (premix)  vancomycin (Vancocin) placeholder  pantoprazole (ProtoNix) EC tablet 40 mg  pantoprazole (ProtoNix) injection 40 mg  sennosides-docusate sodium (Judy-Colace) 8.6-50 mg per tablet 1 tablet  doxycycline (Vibramycin) in dextrose 5 % in water (D5W) 100 mL  mg  LORazepam (Ativan) injection 2 mg  magnesium sulfate IV 2 g  zinc oxide 20 % ointment 1 Application  nystatin (Mycostatin) cream  norepinephrine (Levophed) 8 mg in dextrose 5% 250 mL (0.032 mg/mL) infusion (premix)  heparin 1,000 unit/mL injection 2,000 Units  heparin 1,000 unit/mL injection 2,000 Units  nystatin (Mycostatin) ointment  acetaminophen (Tylenol) oral liquid 1,000 mg  albumin human 25 % solution 12.5 g  perflutren lipid microspheres (Definity) injection 0.5-10 mL of dilution  sulfur hexafluoride microsphr (Lumason) injection 24.28 mg  perflutren protein A microsphere (Optison) injection 0.5 mL  ipratropium-albuteroL (Duo-Neb) 0.5-2.5 mg/3 mL nebulizer solution 3 mL  sodium chloride 3 % nebulizer solution 3 mL  vancomycin-diluent combo no.1 (Xellia) IVPB 750 mg  sennosides-docusate sodium (Judy-Colace) 8.6-50 mg per tablet 1 tablet  acetaminophen (Tylenol) tablet 650 mg  doxycycline (Vibramycin) capsule 100 mg  magnesium oxide (Mag-Ox) tablet 400 mg  vasopressin (Vasostrict) 0.2 unit/mL infusion  norepinephrine (Levophed) 8 mg in dextrose 5% 250 mL (0.032 mg/mL) infusion (premix)  heparin 1,000 unit/mL injection 2,000 Units  heparin 1,000 unit/mL  injection 2,000 Units  albumin human 25 % solution 12.5 g  vancomycin (Xellia) 1 g in 200 mL (Xellia) IVPB 1 g  ipratropium-albuteroL (Duo-Neb) 0.5-2.5 mg/3 mL nebulizer solution 3 mL  sodium chloride 3 % nebulizer solution 3 mL  albumin human 5 % infusion 12.5 g  heparin 1,000 unit/mL injection 2,000 Units  heparin 1,000 unit/mL injection 2,000 Units  vancomycin (Vancocin) capsule 125 mg  albumin human 25 % solution 12.5 g  heparin 1,000 unit/mL injection 2,000 Units  heparin 1,000 unit/mL injection 2,000 Units  ipratropium-albuteroL (Duo-Neb) 0.5-2.5 mg/3 mL nebulizer solution 3 mL  lidocaine (Xylocaine) 10 mg/mL (1 %) injection 50 mg  sodium phosphate 15 mmol in sodium chloride 0.9% 250 mL IV  sod phos di, mono-K phos mono (K Phos Neutral) tablet 250 mg  potassium, sodium phosphates (Phos-NaK) 280-160-250 mg packet 1 packet  doxycycline (Vibramycin) capsule 100 mg  fludrocortisone (Florinef) tablet 0.1 mg  gabapentin (Neurontin) capsule 100 mg  acetaminophen (Tylenol) tablet 1,000 mg  benzocaine-menthoL (Dermoplast) topical spray  vancomycin (Vancocin) capsule 125 mg  heparin 1,000 unit/mL injection 2,000 Units  heparin 1,000 unit/mL injection 2,000 Units  magnesium sulfate IV 2 g  albumin human 25 % solution 12.5 g  promethazine (Phenergan) injection 12.5 mg  ondansetron (Zofran) injection 4 mg  lidocaine (Xylocaine) 10 mg/mL (1 %) injection 50 mg  alteplase (Cathflo Activase) injection 2 mg  acetaminophen (Tylenol) tablet 650 mg  psyllium (Metamucil) 3.4 gram packet 1 packet  perflutren lipid microspheres (Definity) injection 0.5-10 mL of dilution  sulfur hexafluoride microsphr (Lumason) injection 24.28 mg  perflutren protein A microsphere (Optison) injection 0.5 mL  epoetin di-epbx (Retacrit) injection 10,000 Units  sulfur hexafluoride microsphr (Lumason) injection 24.28 mg  perflutren lipid microspheres (Definity) injection 0.5-10 mL of dilution  perflutren lipid microspheres (Definity) injection 0.5-10 mL  of dilution  sulfur hexafluoride microsphr (Lumason) injection 24.28 mg  perflutren protein A microsphere (Optison) injection 0.5 mL  ammonium lactate (Lac-Hydrin) 12 % lotion 1 Application  norepinephrine (Levophed) 8 mg in dextrose 5% 250 mL (0.032 mg/mL) infusion (premix)  fludrocortisone (Florinef) tablet 0.1 mg  albumin human 25 % solution 25 g  albumin human 25 % solution 25 g  meropenem (Merrem) 500 mg in sodium chloride 0.9 % 100 mL IV  potassium chloride 20 mEq in 100 mL IV premix  potassium chloride 20 mEq in 100 mL IV premix  magnesium sulfate IV 2 g  cefepime (Maxipime) 1 g in dextrose 5 % 50 mL IV  vancomycin-diluent combo no.1 (Xellia) IVPB 750 mg  albumin human 25 % solution 12.5 g  vancomycin (Xellia) 1 g in 200 mL (Xellia) IVPB 1 g  vancomycin (Vancocin) placeholder  vancomycin (Xellia) 1 g in 200 mL (Xellia) IVPB 1 g      Relevant Results  Labs  Results from last 72 hours   Lab Units 02/17/24  0642 02/15/24  0933   WBC AUTO x10*3/uL 13.2* 12.6*   HEMOGLOBIN g/dL 8.1* 8.0*   HEMATOCRIT % 26.2* 26.1*   PLATELETS AUTO x10*3/uL 155 192   NEUTROS PCT AUTO %  --  55.3   LYMPHS PCT AUTO %  --  26.1   MONOS PCT AUTO %  --  9.5   EOS PCT AUTO %  --  7.1     Results from last 72 hours   Lab Units 02/17/24  0642 02/15/24  0933   SODIUM mmol/L 137 132*   POTASSIUM mmol/L 3.2* 3.4   CHLORIDE mmol/L 101 99   CO2 mmol/L 22* 19*   BUN mg/dL 21 21   CREATININE mg/dL 2.30* 2.50*   GLUCOSE mg/dL 207* 219*   CALCIUM mg/dL 8.7 8.3*   ANION GAP mmol/L 14 14   EGFR mL/min/1.73m*2 22* 20*         Estimated Creatinine Clearance: 24.7 mL/min (A) (by C-G formula based on SCr of 2.3 mg/dL (H)).  C-Reactive Protein   Date Value Ref Range Status   12/25/2023 5.20 (H) 0.00 - 2.00 mg/dL Final     CRP   Date Value Ref Range Status   06/23/2023 19.9 (H) 0 - 2.0 MG/DL Final     Comment:     Performed at [address] [address]   05/22/2022 1.0 0 - 2.0 MG/DL Final     Comment:     Performed at [place] [address]  [place] [id number]     Microbiology  Susceptibility data from last 14 days.  Collected Specimen Info Organism Ampicillin Aztreonam Cefazolin Cefepime Ceftazidime Ciprofloxacin Clindamycin Erythromycin Gentamicin Levofloxacin Oxacillin Piperacillin/Tazobactam Tetracycline Tobramycin   02/10/24 Tissue/Biopsy from Other (specify in comments) Proteus mirabilis S  S   S   S S  S R      Methicillin Resistant Staphylococcus aureus (MRSA)       R R   R  R      Pseudomonas aeruginosa                 02/10/24 Tissue/Biopsy from Wound/Tissue Proteus mirabilis S  S   S   S S  S R      Pseudomonas aeruginosa  S  S S S    S  S  S     Collected Specimen Info Organism Trimethoprim/Sulfamethoxazole Vancomycin   02/10/24 Tissue/Biopsy from Other (specify in comments) Proteus mirabilis S      Methicillin Resistant Staphylococcus aureus (MRSA) S S     Pseudomonas aeruginosa     02/10/24 Tissue/Biopsy from Wound/Tissue Proteus mirabilis S      Pseudomonas aeruginosa       Imaging  Transthoracic Echo (TTE) Limited    Result Date: 2/7/2024           [place] [id number]            Phone [phone] TRANSTHORACIC ECHOCARDIOGRAM REPORT  Patient Name:      BASIA Gray Physician:    [id number] Littlejohn DO Study Date:        2/7/2024            Ordering Provider:    [id number] LITTLEJOHN MRN/PID:           [id number]            Fellow: Accession#:        [id number]        Nurse: Date of Birth/Age: 1950 / 73 years Sonographer:          Rena Talavera ACS,                                                              YAEL, FABIAN Gender:            F                   Additional Staff: Height:            157.48 cm           Admit Date: Weight:                                Admission  Status:     Inpatient - Routine BSA:               m2                  Department Location:  [facility] ICU Blood Pressure: 95 /42 mmHg Study Type:    TRANSTHORACIC ECHO (TTE) LIMITED Diagnosis/ICD: Hypotension, unspecified-I95.9; Mitral valve disorder-I05.9 Indication:    Limited to assess for MS, Hypotension CPT Codes:     Echo Limited-64508; Color Doppler-91292; Doppler Limited-03888 Patient History: Pertinent History: Hypotension, hx of MV endocarditis, PAD, DM2 ESRD on Hd. Study Detail: The following Echo studies were performed: 2D, M-Mode, Doppler and               color flow. Technically challenging study due to poor acoustic               windows and L Breast implant. Unable to obtain suprasternal notch               view.  PHYSICIAN INTERPRETATION: Left Ventricle: Left ventricular systolic function is normal. There are no regional wall motion abnormalities. The left ventricular cavity size is normal. Left ventricular diastolic filling was not assessed. Left Atrium: The left atrium is moderate to severely dilated. Right Ventricle: The right ventricle is normal in size. There is normal right ventricular global systolic function. Right Atrium: The right atrium was not well visualized. Aortic Valve: The aortic valve was not assessed. Aortic valve regurgitation was not assessed. Mitral Valve: The mitral valve is abnormal. There is evidence of mild mitral valve stenosis. The doppler estimated mean and peak diastolic pressure gradients are 4.0 mmHg and 6.6 mmHg respectively. There is severe mitral annular calcification. There is no evidence of mitral valve regurgitation. Tricuspid Valve: The tricuspid valve was not assessed. Tricuspid regurgitation was not assessed. Pulmonic Valve: The pulmonic valve is not well visualized. The pulmonic valve regurgitation was not assessed. Pericardium: There is no pericardial effusion noted. Aorta: The aortic root was not assessed.  CONCLUSIONS:  1. Left ventricular systolic  function is normal.  2. The left atrium is moderate to severely dilated.  3. There is severe mitral annular calcification. QUANTITATIVE DATA SUMMARY: LV DIASTOLIC FUNCTION:                        Normal Ranges: MV Peak E:    1.23 m/s (0.7-1.2 m/s) MV Peak A:    1.12 m/s (0.42-0.7 m/s) E/A Ratio:    1.10     (1.0-2.2) MV lateral e' 0.04 m/s MV medial e'  0.07 m/s MITRAL VALVE:                      Normal Ranges: MV Vmax:    1.28 m/s (<=1.3m/s) MV peak P.6 mmHg (<5mmHg) MV mean P.0 mmHg (<48mmHg)  RIGHT VENTRICLE: RV Basal 2.97 cm  [id number] Lemus DO Electronically signed on 2024 at 3:50:21 PM  ** Final **     Transthoracic Echo (TTE) Limited    Result Date: 2024           [place] [address]            Phone [phone] TRANSTHORACIC ECHOCARDIOGRAM REPORT  Patient Name:      BASIATRAVIS Gray Physician:    [id number] Lemus DO Study Date:        2024            Ordering Provider:    [id number] REBECA RESENDEZ MRN/PID:           [id number]            Fellow: Accession#:        [id number]        Nurse: Date of Birth/Age: 1950 / 73 years Sonographer:          Jennifer WALL Gender:            F                   Additional Staff: Height:            157.48 cm           Admit Date:           2024 Weight:            98.88 kg            Admission Status:     Inpatient - Routine BSA:               1.98 m2             Department Location: Blood Pressure: 59 /49 mmHg Study Type:    TRANSTHORACIC ECHO (TTE) LIMITED Diagnosis/ICD: Acute on chronic diastolic (congestive) heart failure                (CHF)-I50.33 Indication:    Acute on chronic diastolic congestive heart failure CPT Codes:     Echo Limited-77519; Color Doppler-16938; Doppler Limited-22397 Patient History: Pertinent History: PAF Peripheralvascular  disease low blood pressure HTN Breast                    Cancer Mixed Hyperlipidemia HF CVA Hypotension DMII CAD CKD. Study Detail: The following Echo studies were performed: 2D, M-Mode, Doppler and               color flow. Technically challenging study due to body habitus,               patient lying in supine position, poor acoustic windows, prominent               lung artifact and Breast Prosthesis. Definity used as a contrast               agent for endocardial border definition. Unable to obtain               suprasternal notch view.  PHYSICIAN INTERPRETATION: Left Ventricle: Left ventricular systolic function is normal, with an estimated ejection fraction of 60-65%. There are no regional wall motion abnormalities. The left ventricular cavity size is normal. Left ventricular diastolic filling was indeterminate. Left Atrium: The left atrium is moderately dilated. Right Ventricle: The right ventricle is normal in size. There is normal right ventricular global systolic function. Right Atrium: The right atrium is normal in size. Aortic Valve: The aortic valve is trileaflet. There is no evidence of aortic valve regurgitation. The peak instantaneous gradient of the aortic valve is 5.3 mmHg. Mitral Valve: The mitral valve is normal in structure. There is severe mitral annular calcification. There is trace mitral valve regurgitation. Tricuspid Valve: The tricuspid valve is structurally normal. There is mild tricuspid regurgitation. Pulmonic Valve: The pulmonic valve is not well visualized. The pulmonic valve regurgitation was not well visualized. Pericardium: There is no pericardial effusion noted. Aorta: The aortic root is normal.  CONCLUSIONS:  1. Left ventricular systolic function is normal with a 60-65% estimated ejection fraction.  2. The left atrium is moderately dilated.  3. There is severe mitral annular calcification. QUANTITATIVE DATA SUMMARY: 2D MEASUREMENTS:                          Normal Ranges: LAs:            3.80 cm   (2.7-4.0cm) IVSd:          0.92 cm   (0.6-1.1cm) LVPWd:         1.09 cm   (0.6-1.1cm) LVIDd:         2.98 cm   (3.9-5.9cm) LVIDs:         2.12 cm LV Mass Index: 41.2 g/m2 LV % FS        28.9 % LA VOLUME:                               Normal Ranges: LA Vol A4C:        58.3 ml    (22+/-6mL/m2) LA Vol A2C:        60.9 ml LA Vol BP:         62.1 ml LA Vol Index A4C:  29.4ml/m2 LA Vol Index A2C:  30.7 ml/m2 LA Vol Index BP:   31.3 ml/m2 LA Area A4C:       20.5 cm2 LA Area A2C:       20.1 cm2 LA Major Axis A4C: 6.1 cm LA Major Axis A2C: 5.6 cm LA Volume Index:   29.3 ml/m2 LA Vol A4C:        53.1 ml LA Vol A2C:        58.4 ml LV SYSTOLIC FUNCTION BY 2D PLANIMETRY (MOD):                     Normal Ranges: EF-A4C View: 68.2 % (>=55%) EF-A2C View: 65.9 % EF-Biplane:  67.8 % AORTIC VALVE:                         Normal Ranges: AoV Vmax:      1.15 m/s (<=1.7m/s) AoV Peak P.3 mmHg (<20mmHg) LVOT Max Shimon:  1.11 m/s (<=1.1m/s) LVOT VTI:      16.20 cm LVOT Diameter: 1.90 cm  (1.8-2.4cm) AoV Area,Vmax: 2.74 cm2 (2.5-4.5cm2) PULMONIC VALVE:                         Normal Ranges: PV Accel Time: 63 msec  (>120ms) PV Max Shimon:    1.0 m/s  (0.6-0.9m/s) PV Max P.3 mmHg  [id number] Herminio Lemus DO Electronically signed on 2024 at 7:58:25 AM  ** Final **     Upper extremity venous duplex bilateral    Result Date: 2024           [address] [address]            Phone [phone]  Vascular Lab Report  VASC US UPPER EXTREMITY VENOUS DUPLEX BILATERAL Patient Name:      BASIA Gray Physician:  [id number] Mini Busby MD, RPVI Study Date:        2024           Ordering Provider:  [id number] ERI BERMAN MRN/PID:           [id number]            Fellow: Accession#:        [id number]        Technologist:       Dara Parr RVRICHIE Date of Birth/Age: 1950 / 73 years Technologist 2: Gender:             F                   Encounter#:         [id number] Admission Status:  Inpatient           Location Performed: [facility]  Diagnosis/ICD: Right arm swelling-M79.89; Left arm swelling-M79.89 CPT Codes:     95847 Peripheral venous duplex scan for DVT complete  CONCLUSIONS:  Right Upper Venous: No evidence of acute deep vein thrombus visualized in the right upper extremity. Internal jugular vein was visualized in segments due to IV lines and bandages. Left Upper Venous: No evidence of acute deep vein thrombus visualized in the left upper extremity. Subclavian stent is noted and appears patent. There is a known occluded dialysis access noted.  Additional Findings: Technically difficult exam due to IV lines, bandages, and patient's positioning.  Imaging & Doppler Findings:  Right               Compressible Thrombus        Flow Internal Jugular        Yes        None   Spontaneous/Phasic Subclavian              Yes        None Subclavian Proximal     Yes        None   Spontaneous/Phasic Subclavian Mid          Yes        None Subclavian Distal       Yes        None   Spontaneous/Phasic Axillary                Yes        None       Pulsatile Brachial                Yes        None Cephalic                Yes        None Basilic                 Yes        None  Left                Compress Thrombus        Flow Internal Jugular      Yes      None       Pulsatile Subclavian            Yes      None Subclavian Proximal   Yes      None       Pulsatile Subclavian Mid        Yes      None       Pulsatile Subclavian Distal     Yes      None       Pulsatile Axillary              Yes      None   Spontaneous/Phasic Brachial              Yes      None Cephalic              Yes      None Basilic               Yes      None  [id number] Mini Busby MD, RPMOOKIE Electronically signed by [id number] Mini Busby MD, ALICE on 1/25/2024 at 4:06:13 PM  ** Final **     ECG 12 lead    Result Date: 1/24/2024  Sinus tachycardia  with 1st  degree AV block Right bundle branch block Possible Lateral infarct , age undetermined Abnormal ECG Confirmed by Yesika Nj ([id number]) on 1/24/2024 6:54:45 AM    XR tibia fibula right 2 views    Result Date: 1/22/2024  Interpreted By:  Real Mendieta, STUDY: XR TIBIA FIBULA RIGHT 2 VIEWS; 1/22/2024 2:15 pm   INDICATION: Signs/Symptoms:evaluate source of infection, osteomyelitis;   COMPARISON: None available.   ACCESSION NUMBER(S): [id number]   ORDERING CLINICIAN: BAN GIFFORD   TECHNIQUE: Views: AP and Lateral of the right tibia and fibula   FINDINGS: RESULT: There is no evidence for fracture or dislocation. Tricompartmental degenerative changes of the right knee. The tibia and fibula appear intact without bony erosion or evidence for osteomyelitis. No other bony or soft tissue abnormality is identified. No subcutaneous gas is noted.       No evidence for acute osseous abnormality. No bony erosion to suggest osteomyelitis.   Signed by: Real Mendieta 1/22/2024 3:15 PM Dictation workstation:   [id number]    XR ankle right 3+ views    Result Date: 1/22/2024  Interpreted By:  Real Mendieta, STUDY: XR ANKLE RIGHT 3+ VIEWS; 1/22/2024 2:15 pm   INDICATION: Signs/Symptoms:Evuluate source of infection, osteomyelitis;   COMPARISON: None available.   ACCESSION NUMBER(S): [id number]   ORDERING CLINICIAN: BAN GIFFORD   TECHNIQUE: Views: AP, Lateral, Oblique, right ankle   FINDINGS: RESULT: There is no evidence for fracture or dislocation. The ankle mortise is intact. Joint spaces appear adequately maintained. No bony erosion to suggest osteomyelitis. No bone lesion or soft tissue abnormality is identified. No subcutaneous gas is seen.       No evidence for acute osseous abnormality. No bony erosion to suggest osteomyelitis.   Signed by: Real Mendieta 1/22/2024 3:14 PM Dictation workstation:   [id number]    Transthoracic Echo (TTE) Complete    Result Date: 1/22/2024           [facility] [address]  [place] [id number]            Phone [phone] TRANSTHORACIC ECHOCARDIOGRAM REPORT  Patient Name:      BASIA VEGA      Reading Physician:   [id number] Fausto Rowe DO Study Date:        1/22/2024           Ordering Provider:   [id number] ERI ODETTE                                                             CULLEN MRN/PID:           [id number]            Fellow: Accession#:        [id number]        Nurse: Date of Birth/Age: 1950 / 73 years Sonographer:         Tabatha Page RDCS Gender:            F                   Additional Staff: Height:            157.00 cm           Admit Date: Weight:            75.00 kg            Admission Status:    Inpatient - Routine BSA:               1.76 m2             Department Location: [facility] Blood Pressure: 88 /49 mmHg Study Type:    TRANSTHORACIC ECHO (TTE) COMPLETE Diagnosis/ICD: Chronic combined systolic (congestive) and diastolic (congestive)                heart failure (CHF)-I50.42; Sepsis, unspecified organism-A41.9 Indication:    heart failure,septic shock CPT Codes:     Echo Complete w Full Doppler-88457 Patient History: BMI:               Obese >30 Pertinent History: Sepsis, PVD, A-Fib, CVA, Cancer and HTN. breast                    prosthesis/ca, ESRD,anemia,septic shock,heart failure. Study Detail: The following Echo studies were performed: 2D, M-Mode, Doppler and               color flow. Technically challenging study due to prosthesis,               prominent lung artifact and body habitus.  PHYSICIAN INTERPRETATION: Left Ventricle: Left ventricular systolic function is normal, with an estimated ejection fraction of 70%. There are no regional wall motion abnormalities. The left ventricular cavity size is normal. Left ventricular diastolic filling was indeterminate. Left Atrium: The left atrium is moderately dilated. Right Ventricle: The right ventricle is normal in size. There is normal right ventricular global systolic function. Right Atrium:  The right atrium is normal in size. Aortic Valve: The aortic valve is trileaflet. There is no evidence of aortic valve regurgitation. The peak instantaneous gradient of the aortic valve is 2.8 mmHg. Mitral Valve: The mitral valve is normal in structure. There is no evidence of mitral valve regurgitation. Tricuspid Valve: The tricuspid valve is structurally normal. There is trace tricuspid regurgitation. Pulmonic Valve: The pulmonic valve is not well visualized. The pulmonic valve regurgitation was not well visualized. Pericardium: There is no pericardial effusion noted. Aorta: The aortic root is normal.  CONCLUSIONS:  1. Left ventricular systolic function is normal with a 70% estimated ejection fraction.  2. The left atrium is moderately dilated.  3. Left ventricular diastolic filling indeterminate. QUANTITATIVE DATA SUMMARY: 2D MEASUREMENTS:                          Normal Ranges: LAs:           4.50 cm   (2.7-4.0cm) IVSd:          0.61 cm   (0.6-1.1cm) LVPWd:         0.88 cm   (0.6-1.1cm) LVIDd:         3.66 cm   (3.9-5.9cm) LV Mass Index: 41.9 g/m2 LV SYSTOLIC FUNCTION BY 2D PLANIMETRY (MOD):                     Normal Ranges: EF-A4C View: 68.3 % (>=55%) LV DIASTOLIC FUNCTION:                     Normal Ranges: MV Peak E: 1.19 m/s (0.7-1.2 m/s) MV Peak A: 0.90 m/s (0.42-0.7 m/s) E/A Ratio: 1.32     (1.0-2.2) MITRAL VALVE:                 Normal Ranges: MV DT: 192 msec (150-240msec) AORTIC VALVE:                         Normal Ranges: AoV Vmax:      0.84 m/s (<=1.7m/s) AoV Peak P.8 mmHg (<20mmHg) LVOT Max Shimon:  0.47 m/s (<=1.1m/s) LVOT Diameter: 1.90 cm  (1.8-2.4cm) AoV Area,Vmax: 1.57 cm2 (2.5-4.5cm2) TRICUSPID VALVE/RVSP:                   Normal Ranges: IVC Diam: 1.05 cm  [id number] [facility] DENNIS Electronically signed on 2024 at 2:53:04 PM  ** Final **     XR foot left 3+ views    Result Date: 2024  Interpreted By:  Real Mendieta, STUDY: XR FOOT LEFT 1-2 VIEWS; 2024 4:15 pm   INDICATION:  Signs/Symptoms:osteomyelitis. Pain   COMPARISON: 12/07/2023   ACCESSION NUMBER(S): [id number]   ORDERING CLINICIAN: BAN GIFFORD   TECHNIQUE: Views:  AP, Lat, Oblique, left foot   FINDINGS: RESULT: There is no evidence for fracture or dislocation. 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 Mild hammertoe deformities. Joint spaces appear adequately maintained. Soft tissue ulceration of the posterior aspect of the heel however no evidence for bony erosion to suggest osteomyelitis.       No evidence for acute fracture or acute bony erosion to suggest osteomyelitis. Chronic changes as described.   Signed by: Real Mendieta 1/21/2024 7:15 PM Dictation workstation:   [id number]    CT chest abdomen pelvis wo IV contrast    Result Date: 1/21/2024  Interpreted By:  Maria Garcia, STUDY: CT CHEST ABDOMEN PELVIS WO CONTRAST;  1/20/2024 11:42 pm   INDICATION: Mental status change. Elevated white blood cell count with infectious workup.   COMPARISON: 08/20/2023   ACCESSION NUMBER(S): [id number]   ORDERING CLINICIAN: BAN GIFFORD   TECHNIQUE: CT of the chest, abdomen and pelvis was performed with no oral or intravenous contrast administered. Sagittal and coronal reformations were completed by the technologist at the acquisition scanner.   All CT examinations are performed with 1 or more of the following dose reduction techniques: Automated exposure control, adjustment of mA and/or kv according to patient's size, or use of iterative reconstruction techniques.   FINDINGS: Please note that the study is limited without intravenous contrast.   CHEST: Bilateral pleural effusions are present with right effusion moderately small in size and with the left effusion moderately small in size as well. There is shift of the heart and mediastinum to the left of midline due to atelectasis of the left upper lobe. There is consolidation throughout left lower lobe with air bronchograms noted. On the right, there is  compressive atelectasis seen posteriorly in the right lower lobe.   There is mild reactive mediastinal adenopathy seen at this time with mediastinal lymph nodes increased in size since prior study. Several of these mediastinal nodes are at the upper limits of normal measuring 8 mm in short axis diameter.   No pericardial effusion is present. The cardiac size is normal with mitral annulus calcification.   There has been prior bilateral mastectomy with reconstruction of the left breast with implant placement. Surgical clips are visible within the left axilla. Stent grafts are visible within the left upper extremity and within the left innominate, subclavian as well as axillary veins.   A peripherally calcified 1.8 cm nodule arises from the lower pole of left thyroid lobe.   ABDOMEN:   LIVER: Unremarkable.   BILE DUCTS: No intrahepatic or extrahepatic biliary ductal dilatation is seen.   GALLBLADDER: Cholelithiasis is observed with some calcification of the gallbladder wall demonstrated as well. No gallbladder wall thickening or pericholecystic fluid is evident.   PANCREAS: Unremarkable   SPLEEN: Unremarkable   ADRENAL GLANDS: Unremarkable   KIDNEYS AND URETERS: Kidneys are small in size with extensive renal artery calcification demonstrated.   PELVIS:   BLADDER: Amos catheter is present within the decompressed urinary bladder.   REPRODUCTIVE ORGANS: Calcification of the arcuate arteries within the uterus is seen. There is no uterine enlargement or adnexal mass.   BOWEL: A rectal balloon is seen. There is no abnormal distention of the intestinal tract.   VESSELS: There is atherosclerosis of the thoracoabdominal aorta and iliac arteries with calcification in the walls of the celiac, splenic, hepatic, and mesenteric arteries.   PERITONEUM/RETROPERITONEUM/LYMPH NODES: There is a minimal amount of ascites seen about the liver and spleen with mild presacral edema noted.   ABDOMINAL WALL: There is anasarca involving the  soft tissues of the abdomen and pelvis. Postoperative change from ventral hernia repair is seen.   BONES: Bilateral sacroiliitis is seen. There is lumbar dextroscoliosis. Chronic rotator cuff tear is present bilaterally with osteoarthritis of both shoulders, right greater than left.       Chest 1.  Moderately small bilateral pleural effusions with left upper lobe atelectasis and compressive atelectasis seen posteriorly in right lower lobe. A left lower lobe pneumonia is identified. There is extensive airspace consolidation within left lower lobe with air bronchograms observed. Mild mediastinal reactive adenopathy is present as well.   Abdomen-Pelvis 1.  Cholelithiasis without evidence of cholecystitis. 2. Small amount of ascites with mild presacral edema and anasarca within the soft tissues of the abdominal wall. 3. Renal atrophy with extensive vascular calcifications.     MACRO: None   Signed by: Marai Garcia 1/21/2024 8:30 AM Dictation workstation:   [id number]    CT head wo IV contrast    Result Date: 1/21/2024  Interpreted By:  Maria Garcia, STUDY: CT HEAD WO IV CONTRAST 1/20/2024 11:42 pm   INDICATION: Signs/Symptoms:mental status changes   COMPARISON: 12/11/2023   ACCESSION NUMBER(S): [id number]   ORDERING CLINICIAN: BAN GIFFORD   TECHNIQUE: Unenhanced axial images of the brain are completed.   All CT examinations are performed with 1 or more of the following dose reduction techniques: Automated exposure control, adjustment of mA and/or kv according to patient's size, or use of iterative reconstruction techniques.   FINDINGS: Helical unenhanced axial images of the brain demonstrate a mild to moderate degree of ventricular enlargement with proportionate widening of the sulci and sylvian fissures. There is no midline shift, mass effect, extra-axial fluid collection, or acute intracranial hemorrhage. There is diminished density seen in the periventricular white matter indicating chronic microvascular ischemic  disease. There is calcified plaque seen within the distal vertebral and internal carotid arteries bilaterally. No calvarial abnormality is seen.       Atrophy and chronic microvascular ischemic disease without acute intracranial process.   Signed by: Maria Garcia 1/21/2024 8:09 AM Dictation workstation:   [id number]    XR chest 1 view    Result Date: 1/20/2024  Interpreted By:  Brendon Perez, STUDY: XR CHEST 1 VIEW; 1/20/2024 5:03 pm   INDICATION: CLINICAL INFORMATION: Signs/Symptoms:Insertion right IJ central line, also left thoracentesis.   COMPARISON: 01/19/2024 at 1338 hours   ACCESSION NUMBER(S): [id number]   ORDERING CLINICIAN: BOZENA ANTONIO   TECHNIQUE: Portable chest one view.   FINDINGS: The cardiac size is indeterminate in view of the AP projection. There is no change in the right-sided dual port central venous catheter. There is a new right internal jugular venous catheter present with the tip at approximately same level as the dual port central venous catheter, overlying the mid to lower right atrium. There is no evidence for pneumothorax. Patient has a history of left thoracentesis without evidence for pneumothorax on the left. There is bilateral basilar alveolar infiltrate and effusions. Left effusion is decreased compared to the study from 1 day earlier.   Surgical clips are identified within left chest wall. Endovascular stent is identified in the distribution of the left subclavian artery.       1. Status post right-sided central venous catheter placement as described above with the tip overlying the mid to caudal aspect of the right atrium. There is no evidence for pneumothorax. 2. No evidence for left-sided pneumothorax after left-sided thoracentesis. Decrease in the left effusion. 3. Bilateral basilar infiltrates and effusions are present. Follow-up to assure complete clearing is suggested.   MACRO: none   Signed by: Brendon Perez 1/20/2024 5:11 PM Dictation workstation:   [id number]    XR  chest 1 view    Result Date: 1/19/2024  Interpreted By:  Real Mendieta, STUDY: XR CHEST 1 VIEW; 1/19/2024 1:38 pm   INDICATION: Signs/Symptoms:dyspnea.   COMPARISON: 12/26/2023   ACCESSION NUMBER(S): [id number]   ORDERING CLINICIAN: EMELY GOLDSMITH   TECHNIQUE: 1 view of the chest was performed.   FINDINGS: There may be a minimal right pleural effusion. Slight prominence of the interstitium otherwise the right lung is clear. Improved appearance of the left lung with improved aeration of the right upper lobe. There is opacification of the left lower lobe possibly due to large pleural effusion which is similar to the prior study. No pneumothorax. Right-sided dual lumen central line catheter with tip at the proximal atrium. The cardiomediastinal silhouette is within normal limits. Left-sided subclavian stents are again noted.       Improved aeration and appearance of the left lung superiorly however there is a persistent large left pleural effusion and opacification of the left lower lobe.   Signed by: Real Mendieta 1/19/2024 1:44 PM Dictation workstation:   [id number]     Assessment/Plan   Septic shock-resolved  Left-sided pleural effusion   Left lower lobe pneumonia-treated  Proteus urinary tract infection-treated  History of MRSA endocarditis  History of C. 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

## 2024-02-18 NOTE — PROGRESS NOTES
Ayanna Gross is a 73 y.o. female on day 30 of admission presenting with Septic shock (CMS/HCC).    Subjective   Interval History:    Afebrile, no chills  Awake, alert  Remains off pressors    Review of Systems   All other systems reviewed and are negative.      Objective   Range of Vitals (last 24 hours)  Heart Rate:  []   Temp:  [36.3 °C (97.3 °F)-36.8 °C (98.2 °F)]   Resp:  [13-25]   BP: (59-77)/(22-61)   Weight:  [99.2 kg (218 lb 11.1 oz)]   SpO2:  [92 %-99 %]   Daily Weight  02/18/24 : 99.2 kg (218 lb 11.1 oz)    Body mass index is 40 kg/m².    Physical Exam  Constitutional:       Appearance: Awake, alert  HENT:      Head: Normocephalic and atraumatic.      Nose: Nose normal.   Eyes:      Extraocular Movements: Extraocular movements intact.      Conjunctiva/sclera: Conjunctivae normal.   Cardiovascular:      Heart sounds: Normal heart sounds, S1 normal and S2 normal.   Pulmonary:      Breath sounds: Decreased breath sounds present.   Abdominal:      General: Bowel sounds are normal.      Palpations: Abdomen is soft.   Musculoskeletal:      Cervical back: Normal range of motion and neck supple.   Skin:     Comments: Bilateral posterior heel eschar -photos examined  Neurological:      Mental Status: She is awake, alert    Antibiotics  aspirin tablet 325 mg  acetaminophen (Tylenol) tablet 650 mg  cefepime (Maxipime) 1 g in dextrose 5 % 50 mL IV  sodium chloride 0.9 % bolus 2,229 mL  apixaban (Eliquis) tablet 2.5 mg  escitalopram (Lexapro) tablet 10 mg  febuxostat (Uloric) tablet 40 mg  fenofibrate (Triglide) tablet 160 mg  gabapentin (Neurontin) capsule 100 mg  midodrine (Proamatine) tablet 15 mg  nystatin (Mycostatin) 100,000 unit/gram powder  oxyCODONE-acetaminophen (Percocet) 5-325 mg per tablet 1 tablet  simvastatin (Zocor) tablet 20 mg  piperacillin-tazobactam-dextrose (Zosyn) IV 2.25 g  vancomycin (Vancocin) capsule 125 mg  oxygen (O2) therapy  dextrose 50 % injection 25 g  glucagon (Glucagen)  injection 1 mg  dextrose 10 % in water (D10W) infusion  insulin lispro (HumaLOG) injection 0-10 Units  nystatin (Mycostatin) 100,000 unit/gram powder 1 Application  vancomycin-diluent combo no.1 (Xellia) IVPB 1,750 mg  piperacillin-tazobactam-dextrose (Zosyn) IV 2.25 g  sodium chloride 0.9 % bolus 500 mL  norepinephrine (Levophed) 8 mg in dextrose 5% 250 mL (0.032 mg/mL) infusion (premix)  vancomycin (Vancocin) placeholder  pantoprazole (ProtoNix) EC tablet 40 mg  pantoprazole (ProtoNix) injection 40 mg  sennosides-docusate sodium (Judy-Colace) 8.6-50 mg per tablet 1 tablet  doxycycline (Vibramycin) in dextrose 5 % in water (D5W) 100 mL  mg  LORazepam (Ativan) injection 2 mg  magnesium sulfate IV 2 g  zinc oxide 20 % ointment 1 Application  nystatin (Mycostatin) cream  norepinephrine (Levophed) 8 mg in dextrose 5% 250 mL (0.032 mg/mL) infusion (premix)  heparin 1,000 unit/mL injection 2,000 Units  heparin 1,000 unit/mL injection 2,000 Units  nystatin (Mycostatin) ointment  acetaminophen (Tylenol) oral liquid 1,000 mg  albumin human 25 % solution 12.5 g  perflutren lipid microspheres (Definity) injection 0.5-10 mL of dilution  sulfur hexafluoride microsphr (Lumason) injection 24.28 mg  perflutren protein A microsphere (Optison) injection 0.5 mL  ipratropium-albuteroL (Duo-Neb) 0.5-2.5 mg/3 mL nebulizer solution 3 mL  sodium chloride 3 % nebulizer solution 3 mL  vancomycin-diluent combo no.1 (Xellia) IVPB 750 mg  sennosides-docusate sodium (Judy-Colace) 8.6-50 mg per tablet 1 tablet  acetaminophen (Tylenol) tablet 650 mg  doxycycline (Vibramycin) capsule 100 mg  magnesium oxide (Mag-Ox) tablet 400 mg  vasopressin (Vasostrict) 0.2 unit/mL infusion  norepinephrine (Levophed) 8 mg in dextrose 5% 250 mL (0.032 mg/mL) infusion (premix)  heparin 1,000 unit/mL injection 2,000 Units  heparin 1,000 unit/mL injection 2,000 Units  albumin human 25 % solution 12.5 g  vancomycin (Xellia) 1 g in 200 mL (Xellia) IVPB 1  g  ipratropium-albuteroL (Duo-Neb) 0.5-2.5 mg/3 mL nebulizer solution 3 mL  sodium chloride 3 % nebulizer solution 3 mL  albumin human 5 % infusion 12.5 g  heparin 1,000 unit/mL injection 2,000 Units  heparin 1,000 unit/mL injection 2,000 Units  vancomycin (Vancocin) capsule 125 mg  albumin human 25 % solution 12.5 g  heparin 1,000 unit/mL injection 2,000 Units  heparin 1,000 unit/mL injection 2,000 Units  ipratropium-albuteroL (Duo-Neb) 0.5-2.5 mg/3 mL nebulizer solution 3 mL  lidocaine (Xylocaine) 10 mg/mL (1 %) injection 50 mg  sodium phosphate 15 mmol in sodium chloride 0.9% 250 mL IV  sod phos di, mono-K phos mono (K Phos Neutral) tablet 250 mg  potassium, sodium phosphates (Phos-NaK) 280-160-250 mg packet 1 packet  doxycycline (Vibramycin) capsule 100 mg  fludrocortisone (Florinef) tablet 0.1 mg  gabapentin (Neurontin) capsule 100 mg  acetaminophen (Tylenol) tablet 1,000 mg  benzocaine-menthoL (Dermoplast) topical spray  vancomycin (Vancocin) capsule 125 mg  heparin 1,000 unit/mL injection 2,000 Units  heparin 1,000 unit/mL injection 2,000 Units  magnesium sulfate IV 2 g  albumin human 25 % solution 12.5 g  promethazine (Phenergan) injection 12.5 mg  ondansetron (Zofran) injection 4 mg  lidocaine (Xylocaine) 10 mg/mL (1 %) injection 50 mg  alteplase (Cathflo Activase) injection 2 mg  acetaminophen (Tylenol) tablet 650 mg  psyllium (Metamucil) 3.4 gram packet 1 packet  perflutren lipid microspheres (Definity) injection 0.5-10 mL of dilution  sulfur hexafluoride microsphr (Lumason) injection 24.28 mg  perflutren protein A microsphere (Optison) injection 0.5 mL  epoetin di-epbx (Retacrit) injection 10,000 Units  sulfur hexafluoride microsphr (Lumason) injection 24.28 mg  perflutren lipid microspheres (Definity) injection 0.5-10 mL of dilution  perflutren lipid microspheres (Definity) injection 0.5-10 mL of dilution  sulfur hexafluoride microsphr (Lumason) injection 24.28 mg  perflutren protein A microsphere  (Optison) injection 0.5 mL  ammonium lactate (Lac-Hydrin) 12 % lotion 1 Application  norepinephrine (Levophed) 8 mg in dextrose 5% 250 mL (0.032 mg/mL) infusion (premix)  fludrocortisone (Florinef) tablet 0.1 mg  albumin human 25 % solution 25 g  albumin human 25 % solution 25 g  meropenem (Merrem) 500 mg in sodium chloride 0.9 % 100 mL IV  potassium chloride 20 mEq in 100 mL IV premix  potassium chloride 20 mEq in 100 mL IV premix  magnesium sulfate IV 2 g  cefepime (Maxipime) 1 g in dextrose 5 % 50 mL IV  vancomycin-diluent combo no.1 (Xellia) IVPB 750 mg  albumin human 25 % solution 12.5 g  vancomycin (Xellia) 1 g in 200 mL (Xellia) IVPB 1 g  vancomycin (Vancocin) placeholder  vancomycin (Xellia) 1 g in 200 mL (Xellia) IVPB 1 g      Relevant Results  Labs  Results from last 72 hours   Lab Units 02/18/24  0500 02/17/24  0642 02/15/24  0933   WBC AUTO x10*3/uL 13.3* 13.2* 12.6*   HEMOGLOBIN g/dL 8.0* 8.1* 8.0*   HEMATOCRIT % 26.1* 26.2* 26.1*   PLATELETS AUTO x10*3/uL 198 155 192   NEUTROS PCT AUTO %  --   --  55.3   LYMPHS PCT AUTO %  --   --  26.1   MONOS PCT AUTO %  --   --  9.5   EOS PCT AUTO %  --   --  7.1     Results from last 72 hours   Lab Units 02/18/24  0500 02/17/24  0642 02/15/24  0933   SODIUM mmol/L 132* 137 132*   POTASSIUM mmol/L 3.2* 3.2* 3.4   CHLORIDE mmol/L 98 101 99   CO2 mmol/L 20* 22* 19*   BUN mg/dL 29* 21 21   CREATININE mg/dL 2.70* 2.30* 2.50*   GLUCOSE mg/dL 216* 207* 219*   CALCIUM mg/dL 9.0 8.7 8.3*   ANION GAP mmol/L 14 14 14   EGFR mL/min/1.73m*2 18* 22* 20*         Estimated Creatinine Clearance: 21 mL/min (A) (by C-G formula based on SCr of 2.7 mg/dL (H)).  C-Reactive Protein   Date Value Ref Range Status   12/25/2023 5.20 (H) 0.00 - 2.00 mg/dL Final     CRP   Date Value Ref Range Status   06/23/2023 19.9 (H) 0 - 2.0 MG/DL Final     Comment:     Performed at [facility] [id number] [place] [address]   05/22/2022 1.0 0 - 2.0 MG/DL Final     Comment:     Performed at [facility]  [address] [place], [place] [id number]     Microbiology  Susceptibility data from last 14 days.  Collected Specimen Info Organism Ampicillin Aztreonam Cefazolin Cefepime Ceftazidime Ciprofloxacin Clindamycin Erythromycin Gentamicin Levofloxacin Oxacillin Piperacillin/Tazobactam Tetracycline Tobramycin   02/10/24 Tissue/Biopsy from Other (specify in comments) Proteus mirabilis S  S   S   S S  S R      Methicillin Resistant Staphylococcus aureus (MRSA)       R R   R  R      Pseudomonas aeruginosa                 02/10/24 Tissue/Biopsy from Wound/Tissue Proteus mirabilis S  S   S   S S  S R      Pseudomonas aeruginosa  S  S S S    S  S  S     Collected Specimen Info Organism Trimethoprim/Sulfamethoxazole Vancomycin   02/10/24 Tissue/Biopsy from Other (specify in comments) Proteus mirabilis S      Methicillin Resistant Staphylococcus aureus (MRSA) S S     Pseudomonas aeruginosa     02/10/24 Tissue/Biopsy from Wound/Tissue Proteus mirabilis S      Pseudomonas aeruginosa       Imaging  Transthoracic Echo (TTE) Limited    Result Date: 2/7/2024           [place] [id number]            Phone [phone] TRANSTHORACIC ECHOCARDIOGRAM REPORT  Patient Name:      BASIA Gray Physician:    [id number] Littlejohn DO Study Date:        2/7/2024            Ordering Provider:    [id number] LITTLEJOHN MRN/PID:           [id number]            Fellow: Accession#:        [id number]        Nurse: Date of Birth/Age: 1950 / 73 years Sonographer:          Rena QUARLES,                                                              FABIAN CONLEY Gender:            F                   Additional Staff: Height:            157.48 cm           Admit Date: Weight:                                 Admission Status:     Inpatient - Routine BSA:               m2                  Department Location:  [facility] ICU Blood Pressure: 95 /42 mmHg Study Type:    TRANSTHORACIC ECHO (TTE) LIMITED Diagnosis/ICD: Hypotension, unspecified-I95.9; Mitral valve disorder-I05.9 Indication:    Limited to assess for MS, Hypotension CPT Codes:     Echo Limited-93484; Color Doppler-51907; Doppler Limited-70969 Patient History: Pertinent History: Hypotension, hx of MV endocarditis, PAD, DM2 ESRD on Hd. Study Detail: The following Echo studies were performed: 2D, M-Mode, Doppler and               color flow. Technically challenging study due to poor acoustic               windows and L Breast implant. Unable to obtain suprasternal notch               view.  PHYSICIAN INTERPRETATION: Left Ventricle: Left ventricular systolic function is normal. There are no regional wall motion abnormalities. The left ventricular cavity size is normal. Left ventricular diastolic filling was not assessed. Left Atrium: The left atrium is moderate to severely dilated. Right Ventricle: The right ventricle is normal in size. There is normal right ventricular global systolic function. Right Atrium: The right atrium was not well visualized. Aortic Valve: The aortic valve was not assessed. Aortic valve regurgitation was not assessed. Mitral Valve: The mitral valve is abnormal. There is evidence of mild mitral valve stenosis. The doppler estimated mean and peak diastolic pressure gradients are 4.0 mmHg and 6.6 mmHg respectively. There is severe mitral annular calcification. There is no evidence of mitral valve regurgitation. Tricuspid Valve: The tricuspid valve was not assessed. Tricuspid regurgitation was not assessed. Pulmonic Valve: The pulmonic valve is not well visualized. The pulmonic valve regurgitation was not assessed. Pericardium: There is no pericardial effusion noted. Aorta: The aortic root was not assessed.  CONCLUSIONS:  1. Left ventricular  systolic function is normal.  2. The left atrium is moderate to severely dilated.  3. There is severe mitral annular calcification. QUANTITATIVE DATA SUMMARY: LV DIASTOLIC FUNCTION:                        Normal Ranges: MV Peak E:    1.23 m/s (0.7-1.2 m/s) MV Peak A:    1.12 m/s (0.42-0.7 m/s) E/A Ratio:    1.10     (1.0-2.2) MV lateral e' 0.04 m/s MV medial e'  0.07 m/s MITRAL VALVE:                      Normal Ranges: MV Vmax:    1.28 m/s (<=1.3m/s) MV peak P.6 mmHg (<5mmHg) MV mean P.0 mmHg (<48mmHg)  RIGHT VENTRICLE: RV Basal 2.97 cm  [id number] Lemus DO Electronically signed on 2024 at 3:50:21 PM  ** Final **     Transthoracic Echo (TTE) Limited    Result Date: 2024           [place] [address]            Phone [phone] TRANSTHORACIC ECHOCARDIOGRAM REPORT  Patient Name:      BASIATRAVIS Gray Physician:    [id number] Lemus DO Study Date:        2024            Ordering Provider:    [id number] REBECA RESENDEZ MRN/PID:           [id number]            Fellow: Accession#:        [id number]        Nurse: Date of Birth/Age: 1950 / 73 years Sonographer:          Jennifer WALL Gender:            F                   Additional Staff: Height:            157.48 cm           Admit Date:           2024 Weight:            98.88 kg            Admission Status:     Inpatient - Routine BSA:               1.98 m2             Department Location: Blood Pressure: 59 /49 mmHg Study Type:    TRANSTHORACIC ECHO (TTE) LIMITED Diagnosis/ICD: Acute on chronic diastolic (congestive) heart failure                (CHF)-I50.33 Indication:    Acute on chronic diastolic congestive heart failure CPT Codes:     Echo Limited-73304; Color Doppler-72816; Doppler Limited-00849 Patient History: Pertinent History: PAF  Peripheralvascular disease low blood pressure HTN Breast                    Cancer Mixed Hyperlipidemia HF CVA Hypotension DMII CAD CKD. Study Detail: The following Echo studies were performed: 2D, M-Mode, Doppler and               color flow. Technically challenging study due to body habitus,               patient lying in supine position, poor acoustic windows, prominent               lung artifact and Breast Prosthesis. Definity used as a contrast               agent for endocardial border definition. Unable to obtain               suprasternal notch view.  PHYSICIAN INTERPRETATION: Left Ventricle: Left ventricular systolic function is normal, with an estimated ejection fraction of 60-65%. There are no regional wall motion abnormalities. The left ventricular cavity size is normal. Left ventricular diastolic filling was indeterminate. Left Atrium: The left atrium is moderately dilated. Right Ventricle: The right ventricle is normal in size. There is normal right ventricular global systolic function. Right Atrium: The right atrium is normal in size. Aortic Valve: The aortic valve is trileaflet. There is no evidence of aortic valve regurgitation. The peak instantaneous gradient of the aortic valve is 5.3 mmHg. Mitral Valve: The mitral valve is normal in structure. There is severe mitral annular calcification. There is trace mitral valve regurgitation. Tricuspid Valve: The tricuspid valve is structurally normal. There is mild tricuspid regurgitation. Pulmonic Valve: The pulmonic valve is not well visualized. The pulmonic valve regurgitation was not well visualized. Pericardium: There is no pericardial effusion noted. Aorta: The aortic root is normal.  CONCLUSIONS:  1. Left ventricular systolic function is normal with a 60-65% estimated ejection fraction.  2. The left atrium is moderately dilated.  3. There is severe mitral annular calcification. QUANTITATIVE DATA SUMMARY: 2D MEASUREMENTS:                           Normal Ranges: LAs:           3.80 cm   (2.7-4.0cm) IVSd:          0.92 cm   (0.6-1.1cm) LVPWd:         1.09 cm   (0.6-1.1cm) LVIDd:         2.98 cm   (3.9-5.9cm) LVIDs:         2.12 cm LV Mass Index: 41.2 g/m2 LV % FS        28.9 % LA VOLUME:                               Normal Ranges: LA Vol A4C:        58.3 ml    (22+/-6mL/m2) LA Vol A2C:        60.9 ml LA Vol BP:         62.1 ml LA Vol Index A4C:  29.4ml/m2 LA Vol Index A2C:  30.7 ml/m2 LA Vol Index BP:   31.3 ml/m2 LA Area A4C:       20.5 cm2 LA Area A2C:       20.1 cm2 LA Major Axis A4C: 6.1 cm LA Major Axis A2C: 5.6 cm LA Volume Index:   29.3 ml/m2 LA Vol A4C:        53.1 ml LA Vol A2C:        58.4 ml LV SYSTOLIC FUNCTION BY 2D PLANIMETRY (MOD):                     Normal Ranges: EF-A4C View: 68.2 % (>=55%) EF-A2C View: 65.9 % EF-Biplane:  67.8 % AORTIC VALVE:                         Normal Ranges: AoV Vmax:      1.15 m/s (<=1.7m/s) AoV Peak P.3 mmHg (<20mmHg) LVOT Max Shimon:  1.11 m/s (<=1.1m/s) LVOT VTI:      16.20 cm LVOT Diameter: 1.90 cm  (1.8-2.4cm) AoV Area,Vmax: 2.74 cm2 (2.5-4.5cm2) PULMONIC VALVE:                         Normal Ranges: PV Accel Time: 63 msec  (>120ms) PV Max Shimon:    1.0 m/s  (0.6-0.9m/s) PV Max P.3 mmHg  [id number] Herminio Lemus DO Electronically signed on 2024 at 7:58:25 AM  ** Final **     Upper extremity venous duplex bilateral    Result Date: 2024           [address] [address]            Phone [phone]  Vascular Lab Report  VASC US UPPER EXTREMITY VENOUS DUPLEX BILATERAL Patient Name:      BASIA Gray Physician:  [id number] Mini Busby MD, RPVI Study Date:        2024           Ordering Provider:  [id number] ERI BERMAN MRN/PID:           [id number]            Fellow: Accession#:        [id number]        Technologist:       Dara Parr RVT Date of Birth/Age: 1950 / 73 years  Technologist 2: Gender:            F                   Encounter#:         [id number] Admission Status:  Inpatient           Location Performed: [facility]  Diagnosis/ICD: Right arm swelling-M79.89; Left arm swelling-M79.89 CPT Codes:     75067 Peripheral venous duplex scan for DVT complete  CONCLUSIONS:  Right Upper Venous: No evidence of acute deep vein thrombus visualized in the right upper extremity. Internal jugular vein was visualized in segments due to IV lines and bandages. Left Upper Venous: No evidence of acute deep vein thrombus visualized in the left upper extremity. Subclavian stent is noted and appears patent. There is a known occluded dialysis access noted.  Additional Findings: Technically difficult exam due to IV lines, bandages, and patient's positioning.  Imaging & Doppler Findings:  Right               Compressible Thrombus        Flow Internal Jugular        Yes        None   Spontaneous/Phasic Subclavian              Yes        None Subclavian Proximal     Yes        None   Spontaneous/Phasic Subclavian Mid          Yes        None Subclavian Distal       Yes        None   Spontaneous/Phasic Axillary                Yes        None       Pulsatile Brachial                Yes        None Cephalic                Yes        None Basilic                 Yes        None  Left                Compress Thrombus        Flow Internal Jugular      Yes      None       Pulsatile Subclavian            Yes      None Subclavian Proximal   Yes      None       Pulsatile Subclavian Mid        Yes      None       Pulsatile Subclavian Distal     Yes      None       Pulsatile Axillary              Yes      None   Spontaneous/Phasic Brachial              Yes      None Cephalic              Yes      None Basilic               Yes      None  [id number] Mini Busby MD, RPMOOKIE Electronically signed by [id number] Mini Busby MD, ALICE on 1/25/2024 at 4:06:13 PM  ** Final **     XR tibia fibula right 2 views    Result  Date: 1/22/2024  Interpreted By:  Real Mendieta, STUDY: XR TIBIA FIBULA RIGHT 2 VIEWS; 1/22/2024 2:15 pm   INDICATION: Signs/Symptoms:evaluate source of infection, osteomyelitis;   COMPARISON: None available.   ACCESSION NUMBER(S): [id number]   ORDERING CLINICIAN: BAN GIFFORD   TECHNIQUE: Views: AP and Lateral of the right tibia and fibula   FINDINGS: RESULT: There is no evidence for fracture or dislocation. Tricompartmental degenerative changes of the right knee. The tibia and fibula appear intact without bony erosion or evidence for osteomyelitis. No other bony or soft tissue abnormality is identified. No subcutaneous gas is noted.       No evidence for acute osseous abnormality. No bony erosion to suggest osteomyelitis.   Signed by: Real Mendieta 1/22/2024 3:15 PM Dictation workstation:   [id number]    XR ankle right 3+ views    Result Date: 1/22/2024  Interpreted By:  Real Mendieta, STUDY: XR ANKLE RIGHT 3+ VIEWS; 1/22/2024 2:15 pm   INDICATION: Signs/Symptoms:Evuluate source of infection, osteomyelitis;   COMPARISON: None available.   ACCESSION NUMBER(S): [id number]   ORDERING CLINICIAN: BAN GIFFORD   TECHNIQUE: Views: AP, Lateral, Oblique, right ankle   FINDINGS: RESULT: There is no evidence for fracture or dislocation. The ankle mortise is intact. Joint spaces appear adequately maintained. No bony erosion to suggest osteomyelitis. No bone lesion or soft tissue abnormality is identified. No subcutaneous gas is seen.       No evidence for acute osseous abnormality. No bony erosion to suggest osteomyelitis.   Signed by: Real Mendieta 1/22/2024 3:14 PM Dictation workstation:   [id number]    Transthoracic Echo (TTE) Complete    Result Date: 1/22/2024           [place] [address]            Phone [phone] TRANSTHORACIC ECHOCARDIOGRAM REPORT  Patient Name:      BASIA Gray Physician:   [id number] Fausto Rowe DO Study Date:         1/22/2024           Ordering Provider:   [id number] ERI BERMAN MRN/PID:           [id number]            Fellow: Accession#:        [id number]        Nurse: Date of Birth/Age: 1950 / 73 years Sonographer:         Tabatha Page RDCS Gender:            F                   Additional Staff: Height:            157.00 cm           Admit Date: Weight:            75.00 kg            Admission Status:    Inpatient - Routine BSA:               1.76 m2             Department Location: [facility] Blood Pressure: 88 /49 mmHg Study Type:    TRANSTHORACIC ECHO (TTE) COMPLETE Diagnosis/ICD: Chronic combined systolic (congestive) and diastolic (congestive)                heart failure (CHF)-I50.42; Sepsis, unspecified organism-A41.9 Indication:    heart failure,septic shock CPT Codes:     Echo Complete w Full Doppler-31914 Patient History: BMI:               Obese >30 Pertinent History: Sepsis, PVD, A-Fib, CVA, Cancer and HTN. breast                    prosthesis/ca, ESRD,anemia,septic shock,heart failure. Study Detail: The following Echo studies were performed: 2D, M-Mode, Doppler and               color flow. Technically challenging study due to prosthesis,               prominent lung artifact and body habitus.  PHYSICIAN INTERPRETATION: Left Ventricle: Left ventricular systolic function is normal, with an estimated ejection fraction of 70%. There are no regional wall motion abnormalities. The left ventricular cavity size is normal. Left ventricular diastolic filling was indeterminate. Left Atrium: The left atrium is moderately dilated. Right Ventricle: The right ventricle is normal in size. There is normal right ventricular global systolic function. Right Atrium: The right atrium is normal in size. Aortic Valve: The aortic valve is trileaflet. There is no evidence of aortic valve regurgitation. The peak instantaneous gradient of the aortic valve is 2.8 mmHg.  Mitral Valve: The mitral valve is normal in structure. There is no evidence of mitral valve regurgitation. Tricuspid Valve: The tricuspid valve is structurally normal. There is trace tricuspid regurgitation. Pulmonic Valve: The pulmonic valve is not well visualized. The pulmonic valve regurgitation was not well visualized. Pericardium: There is no pericardial effusion noted. Aorta: The aortic root is normal.  CONCLUSIONS:  1. Left ventricular systolic function is normal with a 70% estimated ejection fraction.  2. The left atrium is moderately dilated.  3. Left ventricular diastolic filling indeterminate. QUANTITATIVE DATA SUMMARY: 2D MEASUREMENTS:                          Normal Ranges: LAs:           4.50 cm   (2.7-4.0cm) IVSd:          0.61 cm   (0.6-1.1cm) LVPWd:         0.88 cm   (0.6-1.1cm) LVIDd:         3.66 cm   (3.9-5.9cm) LV Mass Index: 41.9 g/m2 LV SYSTOLIC FUNCTION BY 2D PLANIMETRY (MOD):                     Normal Ranges: EF-A4C View: 68.3 % (>=55%) LV DIASTOLIC FUNCTION:                     Normal Ranges: MV Peak E: 1.19 m/s (0.7-1.2 m/s) MV Peak A: 0.90 m/s (0.42-0.7 m/s) E/A Ratio: 1.32     (1.0-2.2) MITRAL VALVE:                 Normal Ranges: MV DT: 192 msec (150-240msec) AORTIC VALVE:                         Normal Ranges: AoV Vmax:      0.84 m/s (<=1.7m/s) AoV Peak P.8 mmHg (<20mmHg) LVOT Max Shimon:  0.47 m/s (<=1.1m/s) LVOT Diameter: 1.90 cm  (1.8-2.4cm) AoV Area,Vmax: 1.57 cm2 (2.5-4.5cm2) TRICUSPID VALVE/RVSP:                   Normal Ranges: IVC Diam: 1.05 cm  [id number] Fausto Rowe  Electronically signed on 2024 at 2:53:04 PM  ** Final **     XR foot left 3+ views    Result Date: 2024  Interpreted By:  Real Mendieta, STUDY: XR FOOT LEFT 1-2 VIEWS; 2024 4:15 pm   INDICATION: Signs/Symptoms:osteomyelitis. Pain   COMPARISON: 2023   ACCESSION NUMBER(S): [id number]   ORDERING CLINICIAN: BAN GIFFORD   TECHNIQUE: Views:  AP, Lat, Oblique, left foot   FINDINGS: RESULT:  There is no evidence for fracture or dislocation. 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 Mild hammertoe deformities. Joint spaces appear adequately maintained. Soft tissue ulceration of the posterior aspect of the heel however no evidence for bony erosion to suggest osteomyelitis.       No evidence for acute fracture or acute bony erosion to suggest osteomyelitis. Chronic changes as described.   Signed by: Real Mendieta 1/21/2024 7:15 PM Dictation workstation:   [id number]    CT chest abdomen pelvis wo IV contrast    Result Date: 1/21/2024  Interpreted By:  Maria Garcia, STUDY: CT CHEST ABDOMEN PELVIS WO CONTRAST;  1/20/2024 11:42 pm   INDICATION: Mental status change. Elevated white blood cell count with infectious workup.   COMPARISON: 08/20/2023   ACCESSION NUMBER(S): [id number]   ORDERING CLINICIAN: BAN GIFFORD   TECHNIQUE: CT of the chest, abdomen and pelvis was performed with no oral or intravenous contrast administered. Sagittal and coronal reformations were completed by the technologist at the acquisition scanner.   All CT examinations are performed with 1 or more of the following dose reduction techniques: Automated exposure control, adjustment of mA and/or kv according to patient's size, or use of iterative reconstruction techniques.   FINDINGS: Please note that the study is limited without intravenous contrast.   CHEST: Bilateral pleural effusions are present with right effusion moderately small in size and with the left effusion moderately small in size as well. There is shift of the heart and mediastinum to the left of midline due to atelectasis of the left upper lobe. There is consolidation throughout left lower lobe with air bronchograms noted. On the right, there is compressive atelectasis seen posteriorly in the right lower lobe.   There is mild reactive mediastinal adenopathy seen at this time with mediastinal lymph nodes increased in size since prior study.  Several of these mediastinal nodes are at the upper limits of normal measuring 8 mm in short axis diameter.   No pericardial effusion is present. The cardiac size is normal with mitral annulus calcification.   There has been prior bilateral mastectomy with reconstruction of the left breast with implant placement. Surgical clips are visible within the left axilla. Stent grafts are visible within the left upper extremity and within the left innominate, subclavian as well as axillary veins.   A peripherally calcified 1.8 cm nodule arises from the lower pole of left thyroid lobe.   ABDOMEN:   LIVER: Unremarkable.   BILE DUCTS: No intrahepatic or extrahepatic biliary ductal dilatation is seen.   GALLBLADDER: Cholelithiasis is observed with some calcification of the gallbladder wall demonstrated as well. No gallbladder wall thickening or pericholecystic fluid is evident.   PANCREAS: Unremarkable   SPLEEN: Unremarkable   ADRENAL GLANDS: Unremarkable   KIDNEYS AND URETERS: Kidneys are small in size with extensive renal artery calcification demonstrated.   PELVIS:   BLADDER: Amos catheter is present within the decompressed urinary bladder.   REPRODUCTIVE ORGANS: Calcification of the arcuate arteries within the uterus is seen. There is no uterine enlargement or adnexal mass.   BOWEL: A rectal balloon is seen. There is no abnormal distention of the intestinal tract.   VESSELS: There is atherosclerosis of the thoracoabdominal aorta and iliac arteries with calcification in the walls of the celiac, splenic, hepatic, and mesenteric arteries.   PERITONEUM/RETROPERITONEUM/LYMPH NODES: There is a minimal amount of ascites seen about the liver and spleen with mild presacral edema noted.   ABDOMINAL WALL: There is anasarca involving the soft tissues of the abdomen and pelvis. Postoperative change from ventral hernia repair is seen.   BONES: Bilateral sacroiliitis is seen. There is lumbar dextroscoliosis. Chronic rotator cuff tear is  present bilaterally with osteoarthritis of both shoulders, right greater than left.       Chest 1.  Moderately small bilateral pleural effusions with left upper lobe atelectasis and compressive atelectasis seen posteriorly in right lower lobe. A left lower lobe pneumonia is identified. There is extensive airspace consolidation within left lower lobe with air bronchograms observed. Mild mediastinal reactive adenopathy is present as well.   Abdomen-Pelvis 1.  Cholelithiasis without evidence of cholecystitis. 2. Small amount of ascites with mild presacral edema and anasarca within the soft tissues of the abdominal wall. 3. Renal atrophy with extensive vascular calcifications.     MACRO: None   Signed by: Maria Garcia 1/21/2024 8:30 AM Dictation workstation:   [id number]    CT head wo IV contrast    Result Date: 1/21/2024  Interpreted By:  Maria Garcia, STUDY: CT HEAD WO IV CONTRAST 1/20/2024 11:42 pm   INDICATION: Signs/Symptoms:mental status changes   COMPARISON: 12/11/2023   ACCESSION NUMBER(S): [id number]   ORDERING CLINICIAN: BAN GIFFORD   TECHNIQUE: Unenhanced axial images of the brain are completed.   All CT examinations are performed with 1 or more of the following dose reduction techniques: Automated exposure control, adjustment of mA and/or kv according to patient's size, or use of iterative reconstruction techniques.   FINDINGS: Helical unenhanced axial images of the brain demonstrate a mild to moderate degree of ventricular enlargement with proportionate widening of the sulci and sylvian fissures. There is no midline shift, mass effect, extra-axial fluid collection, or acute intracranial hemorrhage. There is diminished density seen in the periventricular white matter indicating chronic microvascular ischemic disease. There is calcified plaque seen within the distal vertebral and internal carotid arteries bilaterally. No calvarial abnormality is seen.       Atrophy and chronic microvascular ischemic disease  without acute intracranial process.   Signed by: Maria Garcia 1/21/2024 8:09 AM Dictation workstation:   [id number]    XR chest 1 view    Result Date: 1/20/2024  Interpreted By:  Brendon Perez, STUDY: XR CHEST 1 VIEW; 1/20/2024 5:03 pm   INDICATION: CLINICAL INFORMATION: Signs/Symptoms:Insertion right IJ central line, also left thoracentesis.   COMPARISON: 01/19/2024 at 1338 hours   ACCESSION NUMBER(S): [id number]   ORDERING CLINICIAN: BOZENA ANTONIO   TECHNIQUE: Portable chest one view.   FINDINGS: The cardiac size is indeterminate in view of the AP projection. There is no change in the right-sided dual port central venous catheter. There is a new right internal jugular venous catheter present with the tip at approximately same level as the dual port central venous catheter, overlying the mid to lower right atrium. There is no evidence for pneumothorax. Patient has a history of left thoracentesis without evidence for pneumothorax on the left. There is bilateral basilar alveolar infiltrate and effusions. Left effusion is decreased compared to the study from 1 day earlier.   Surgical clips are identified within left chest wall. Endovascular stent is identified in the distribution of the left subclavian artery.       1. Status post right-sided central venous catheter placement as described above with the tip overlying the mid to caudal aspect of the right atrium. There is no evidence for pneumothorax. 2. No evidence for left-sided pneumothorax after left-sided thoracentesis. Decrease in the left effusion. 3. Bilateral basilar infiltrates and effusions are present. Follow-up to assure complete clearing is suggested.   MACRO: none   Signed by: Brendon Perez 1/20/2024 5:11 PM Dictation workstation:   [id number]     Assessment/Plan   Septic shock-resolved  Left-sided pleural effusion   Left lower lobe pneumonia-treated  Proteus urinary tract infection-treated  History of MRSA endocarditis  History of C. 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

## 2024-02-18 NOTE — PROGRESS NOTES
Ayanna Gross is a 73 y.o. female on day 30 of admission presenting with Septic shock (CMS/[facility]).    Critical Care Medicine Progress Note    Admitted on:     1/19/2024  Length of Stay: 30 day(s)     Interval History     She still  off Levophed    awake today following commands no issues overnight.    No changes   Objective   Objective     Vitals:    02/18/24 0900   Weight: 99.2 kg (218 lb 11.1 oz)   Body mass index is 40 kg/m².        2/18/2024     2:00 AM 2/18/2024     3:00 AM 2/18/2024     4:00 AM 2/18/2024     5:00 AM 2/18/2024     6:00 AM 2/18/2024     7:00 AM 2/18/2024     9:00 AM   Vitals   Systolic 65 66 65 64 66 59    Diastolic 41 40 43 42 46 32    Heart Rate 94 90 87 98 91 83    Temp   36.6 °C (97.9 °F)       Resp 18 14 15 18 18 14    Weight (lb)    218.7   218.7   BMI    40 kg/m2   40 kg/m2   BSA (m2)    2.08 m2   2.08 m2        Vent settings:       Intake/Output Summary (Last 24 hours) at 2/18/2024 1323  Last data filed at 2/18/2024 0400  Gross per 24 hour   Intake 450 ml   Output 300 ml   Net 150 ml     Physical exam:  -----------------  Awake and oriented to place and person  Cardiovascular:      Rate and Rhythm: Normal rate. Rhythm irregular.   Pulmonary:      Effort: Pulmonary effort is normal.   Abdominal:      General: Abdomen is flat.   Musculoskeletal:         General: Deformity present. Normal range of motion.      Cervical back: Normal range of motion.      Comments: Abscence of left hand first finger   Skin:     General: Skin is warm.      Capillary Refill: Capillary refill takes less than 2 seconds.      Comments: Skin tear healing to L heal  Wound to bilateral feet and sacrum     Medications     Scheduled Medications:   apixaban, 2.5 mg, oral, BID  cefepime, 1 g, intravenous, q24h  doxycylcine, 100 mg, oral, Daily  epoetin di or biosimilar, 10,000 Units, intravenous, Every Mon/Wed/Fri  escitalopram, 10 mg, oral, Nightly  fludrocortisone, 0.1 mg, oral, TID  gabapentin, 100 mg, oral,  TID  heparin, 2,000 Units, intra-catheter, After Dialysis  heparin, 2,000 Units, intra-catheter, After Dialysis  heparin, 2,000 Units, intra-catheter, After Dialysis  heparin, 2,000 Units, intra-catheter, After Dialysis  insulin lispro, 0-10 Units, subcutaneous, TID with meals  ipratropium-albuteroL, 3 mL, nebulization, q6h while awake  lidocaine, 5 mL, infiltration, Once  midodrine, 15 mg, oral, TID with meals  nystatin, , Topical, BID  nystatin, , Topical, BID  pantoprazole, 40 mg, oral, Daily   Or  pantoprazole, 40 mg, intravenous, Daily  perflutren lipid microspheres, 0.5-10 mL of dilution, intravenous, Once in imaging  perflutren protein A microsphere, 0.5 mL, intravenous, Once in imaging  psyllium, 1 packet, oral, BID  sulfur hexafluoride microsphr, 2 mL, intravenous, Once in imaging  sulfur hexafluoride microsphr, 2 mL, intravenous, Once in imaging  vancomycin, 125 mg, oral, BID  zinc oxide, 1 Application, Topical, BID       Continuous Medications:   norepinephrine, 0.01-3 mcg/kg/min, Last Rate: Stopped (02/12/24 1700)       PRN Medications:     Labs     Results from last 72 hours   Lab Units 02/18/24  0500 02/17/24  0642   GLUCOSE mg/dL 216* 207*   SODIUM mmol/L 132* 137   POTASSIUM mmol/L 3.2* 3.2*   CHLORIDE mmol/L 98 101   CO2 mmol/L 20* 22*   BUN mg/dL 29* 21   CREATININE mg/dL 2.70* 2.30*   EGFR mL/min/1.73m*2 18* 22*   CALCIUM mg/dL 9.0 8.7   MAGNESIUM mg/dL 1.50* 1.60                     Results from last 72 hours   Lab Units 02/18/24  0500 02/17/24  0642   WBC AUTO x10*3/uL 13.3* 13.2*   NRBC AUTO /100 WBCs 0.0 0.0   RBC AUTO x10*6/uL 2.58* 2.57*   HEMOGLOBIN g/dL 8.0* 8.1*   HEMATOCRIT % 26.1* 26.2*   MCV fL 101* 102*   MCH pg 31.0 31.5   MCHC g/dL 30.7* 30.9*   RDW % 21.1* 21.5*   PLATELETS AUTO x10*3/uL 198 155         Lab Results   Component Value Date    BLOODCULT No growth at 4 days -  FINAL REPORT 01/19/2024    BLOODCULT No growth at 4 days -  FINAL REPORT 01/19/2024    GRAMSTAIN (4+) Abundant  Polymorphonuclear leukocytes (A) 02/10/2024    GRAMSTAIN (3+) Moderate Gram negative bacilli (A) 02/10/2024     Lab Results   Component Value Date    URINECULTURE >100,000 Proteus mirabilis (A) 01/19/2024       Imaging and Diagnostic Studies     Lab/Radiology/Diagnostic Review:  Results for orders placed or performed during the hospital encounter of 01/19/24 (from the past 24 hour(s))   POCT GLUCOSE   Result Value Ref Range    POCT Glucose 153 (H) 74 - 99 mg/dL   POCT GLUCOSE   Result Value Ref Range    POCT Glucose 166 (H) 74 - 99 mg/dL   CBC   Result Value Ref Range    WBC 13.3 (H) 4.4 - 11.3 x10*3/uL    nRBC 0.0 0.0 - 0.0 /100 WBCs    RBC 2.58 (L) 4.00 - 5.20 x10*6/uL    Hemoglobin 8.0 (L) 12.0 - 16.0 g/dL    Hematocrit 26.1 (L) 36.0 - 46.0 %     (H) 80 - 100 fL    MCH 31.0 26.0 - 34.0 pg    MCHC 30.7 (L) 32.0 - 36.0 g/dL    RDW 21.1 (H) 11.5 - 14.5 %    Platelets 198 150 - 450 x10*3/uL   Basic metabolic panel   Result Value Ref Range    Glucose 216 (H) 65 - 99 mg/dL    Sodium 132 (L) 133 - 145 mmol/L    Potassium 3.2 (L) 3.4 - 5.1 mmol/L    Chloride 98 97 - 107 mmol/L    Bicarbonate 20 (L) 24 - 31 mmol/L    Urea Nitrogen 29 (H) 8 - 25 mg/dL    Creatinine 2.70 (H) 0.40 - 1.60 mg/dL    eGFR 18 (L) >60 mL/min/1.73m*2    Calcium 9.0 8.5 - 10.4 mg/dL    Anion Gap 14 <=19 mmol/L   Magnesium   Result Value Ref Range    Magnesium 1.50 (L) 1.60 - 3.10 mg/dL   POCT GLUCOSE   Result Value Ref Range    POCT Glucose 213 (H) 74 - 99 mg/dL   POCT GLUCOSE   Result Value Ref Range    POCT Glucose 153 (H) 74 - 99 mg/dL     Upper extremity venous duplex bilateral    Result Date: 1/25/2024           [facility] [id number] [place] [address]            Phone [phone]  Vascular Lab Report  VASC US UPPER EXTREMITY VENOUS DUPLEX BILATERAL Patient Name:      BASIA Gray Physician:  [id number] Mini Busby MD, RPVI Study Date:         1/25/2024           Ordering Provider:  [id number] ERI BERMAN MRN/PID:           [id number]            Fellow: Accession#:        [id number]        Technologist:       Dara Parr RVT Date of Birth/Age: 1950 / 73 years Technologist 2: Gender:            F                   Encounter#:         [id number] Admission Status:  Inpatient           Location Performed: [facility]  Diagnosis/ICD: Right arm swelling-M79.89; Left arm swelling-M79.89 CPT Codes:     66667 Peripheral venous duplex scan for DVT complete  CONCLUSIONS:  Right Upper Venous: No evidence of acute deep vein thrombus visualized in the right upper extremity. Internal jugular vein was visualized in segments due to IV lines and bandages. Left Upper Venous: No evidence of acute deep vein thrombus visualized in the left upper extremity. Subclavian stent is noted and appears patent. There is a known occluded dialysis access noted.  Additional Findings: Technically difficult exam due to IV lines, bandages, and patient's positioning.  Imaging & Doppler Findings:  Right               Compressible Thrombus        Flow Internal Jugular        Yes        None   Spontaneous/Phasic Subclavian              Yes        None Subclavian Proximal     Yes        None   Spontaneous/Phasic Subclavian Mid          Yes        None Subclavian Distal       Yes        None   Spontaneous/Phasic Axillary                Yes        None       Pulsatile Brachial                Yes        None Cephalic                Yes        None Basilic                 Yes        None  Left                Compress Thrombus        Flow Internal Jugular      Yes      None       Pulsatile Subclavian            Yes      None Subclavian Proximal   Yes      None       Pulsatile Subclavian Mid        Yes      None       Pulsatile Subclavian Distal     Yes      None       Pulsatile Axillary              Yes      None   Spontaneous/Phasic Brachial              Yes      None Cephalic               Yes      None Basilic               Yes      None  [id number] Mini Busby MD, ALICE Electronically signed by [id number] ALICE Kelly MD on 1/25/2024 at 4:06:13 PM  ** Final **     ECG 12 lead    Result Date: 1/24/2024  Sinus tachycardia  with 1st degree AV block Right bundle branch block Possible Lateral infarct , age undetermined Abnormal ECG Confirmed by Yesika Nj ([id number]) on 1/24/2024 6:54:45 AM    XR tibia fibula right 2 views    Result Date: 1/22/2024  Interpreted By:  Real Mendieta, STUDY: XR TIBIA FIBULA RIGHT 2 VIEWS; 1/22/2024 2:15 pm   INDICATION: Signs/Symptoms:evaluate source of infection, osteomyelitis;   COMPARISON: None available.   ACCESSION NUMBER(S): [id number]   ORDERING CLINICIAN: BAN GIFFORD   TECHNIQUE: Views: AP and Lateral of the right tibia and fibula   FINDINGS: RESULT: There is no evidence for fracture or dislocation. Tricompartmental degenerative changes of the right knee. The tibia and fibula appear intact without bony erosion or evidence for osteomyelitis. No other bony or soft tissue abnormality is identified. No subcutaneous gas is noted.       No evidence for acute osseous abnormality. No bony erosion to suggest osteomyelitis.   Signed by: Real Mendieta 1/22/2024 3:15 PM Dictation workstation:   [id number]    XR ankle right 3+ views    Result Date: 1/22/2024  Interpreted By:  Real Mendieta, STUDY: XR ANKLE RIGHT 3+ VIEWS; 1/22/2024 2:15 pm   INDICATION: Signs/Symptoms:Evuluate source of infection, osteomyelitis;   COMPARISON: None available.   ACCESSION NUMBER(S): [id number]   ORDERING CLINICIAN: BAN GIFFORD   TECHNIQUE: Views: AP, Lateral, Oblique, right ankle   FINDINGS: RESULT: There is no evidence for fracture or dislocation. The ankle mortise is intact. Joint spaces appear adequately maintained. No bony erosion to suggest osteomyelitis. No bone lesion or soft tissue abnormality is identified. No subcutaneous gas is seen.       No evidence for acute  osseous abnormality. No bony erosion to suggest osteomyelitis.   Signed by: Real Mendieta 1/22/2024 3:14 PM Dictation workstation:   [id number]    Transthoracic Echo (TTE) Complete    Result Date: 1/22/2024           [place] [id number]            Phone [phone] TRANSTHORACIC ECHOCARDIOGRAM REPORT  Patient Name:      BASIA VEGA      Reading Physician:   [id number] Fausto [facility] Study Date:        1/22/2024           Ordering Provider:   [id number] ERI BERMAN MRN/PID:           [id number]            Fellow: Accession#:        [id number]        Nurse: Date of Birth/Age: 1950 / 73 years Sonographer:         Tabatha Page RDCS Gender:            F                   Additional Staff: Height:            157.00 cm           Admit Date: Weight:            75.00 kg            Admission Status:    Inpatient - Routine BSA:               1.76 m2             Department Location: [facility] Blood Pressure: 88 /49 mmHg Study Type:    TRANSTHORACIC ECHO (TTE) COMPLETE Diagnosis/ICD: Chronic combined systolic (congestive) and diastolic (congestive)                heart failure (CHF)-I50.42; Sepsis, unspecified organism-A41.9 Indication:    heart failure,septic shock CPT Codes:     Echo Complete w Full Doppler-[id number] Patient History: BMI:               Obese >30 Pertinent History: Sepsis, PVD, A-Fib, CVA, Cancer and HTN. breast                    prosthesis/ca, ESRD,anemia,septic shock,heart failure. Study Detail: The following Echo studies were performed: 2D, M-Mode, Doppler and               color flow. Technically challenging study due to prosthesis,               prominent lung artifact and body habitus.  PHYSICIAN INTERPRETATION: Left Ventricle: Left ventricular systolic function is normal, with an estimated ejection fraction of 70%. There are no regional wall motion abnormalities. The left ventricular cavity  size is normal. Left ventricular diastolic filling was indeterminate. Left Atrium: The left atrium is moderately dilated. Right Ventricle: The right ventricle is normal in size. There is normal right ventricular global systolic function. Right Atrium: The right atrium is normal in size. Aortic Valve: The aortic valve is trileaflet. There is no evidence of aortic valve regurgitation. The peak instantaneous gradient of the aortic valve is 2.8 mmHg. Mitral Valve: The mitral valve is normal in structure. There is no evidence of mitral valve regurgitation. Tricuspid Valve: The tricuspid valve is structurally normal. There is trace tricuspid regurgitation. Pulmonic Valve: The pulmonic valve is not well visualized. The pulmonic valve regurgitation was not well visualized. Pericardium: There is no pericardial effusion noted. Aorta: The aortic root is normal.  CONCLUSIONS:  1. Left ventricular systolic function is normal with a 70% estimated ejection fraction.  2. The left atrium is moderately dilated.  3. Left ventricular diastolic filling indeterminate. QUANTITATIVE DATA SUMMARY: 2D MEASUREMENTS:                          Normal Ranges: LAs:           4.50 cm   (2.7-4.0cm) IVSd:          0.61 cm   (0.6-1.1cm) LVPWd:         0.88 cm   (0.6-1.1cm) LVIDd:         3.66 cm   (3.9-5.9cm) LV Mass Index: 41.9 g/m2 LV SYSTOLIC FUNCTION BY 2D PLANIMETRY (MOD):                     Normal Ranges: EF-A4C View: 68.3 % (>=55%) LV DIASTOLIC FUNCTION:                     Normal Ranges: MV Peak E: 1.19 m/s (0.7-1.2 m/s) MV Peak A: 0.90 m/s (0.42-0.7 m/s) E/A Ratio: 1.32     (1.0-2.2) MITRAL VALVE:                 Normal Ranges: MV DT: 192 msec (150-240msec) AORTIC VALVE:                         Normal Ranges: AoV Vmax:      0.84 m/s (<=1.7m/s) AoV Peak P.8 mmHg (<20mmHg) LVOT Max Shimon:  0.47 m/s (<=1.1m/s) LVOT Diameter: 1.90 cm  (1.8-2.4cm) AoV Area,Vmax: 1.57 cm2 (2.5-4.5cm2) TRICUSPID VALVE/RVSP:                   Normal Ranges: IVC  Diam: 1.05 cm  [id number] Fausto Rowe DO Electronically signed on 1/22/2024 at 2:53:04 PM  ** Final **     XR foot left 3+ views    Result Date: 1/21/2024  Interpreted By:  Real Mendieta, STUDY: XR FOOT LEFT 1-2 VIEWS; 1/21/2024 4:15 pm   INDICATION: Signs/Symptoms:osteomyelitis. Pain   COMPARISON: 12/07/2023   ACCESSION NUMBER(S): [id number]   ORDERING CLINICIAN: BAN GIFFORD   TECHNIQUE: Views:  AP, Lat, Oblique, left foot   FINDINGS: RESULT: There is no evidence for fracture or dislocation. 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 Mild hammertoe deformities. Joint spaces appear adequately maintained. Soft tissue ulceration of the posterior aspect of the heel however no evidence for bony erosion to suggest osteomyelitis.       No evidence for acute fracture or acute bony erosion to suggest osteomyelitis. Chronic changes as described.   Signed by: Real Mendieta 1/21/2024 7:15 PM Dictation workstation:   [id number]    CT chest abdomen pelvis wo IV contrast    Result Date: 1/21/2024  Interpreted By:  Maria Garcia, STUDY: CT CHEST ABDOMEN PELVIS WO CONTRAST;  1/20/2024 11:42 pm   INDICATION: Mental status change. Elevated white blood cell count with infectious workup.   COMPARISON: 08/20/2023   ACCESSION NUMBER(S): [id number]   ORDERING CLINICIAN: BAN GIFFORD   TECHNIQUE: CT of the chest, abdomen and pelvis was performed with no oral or intravenous contrast administered. Sagittal and coronal reformations were completed by the technologist at the acquisition scanner.   All CT examinations are performed with 1 or more of the following dose reduction techniques: Automated exposure control, adjustment of mA and/or kv according to patient's size, or use of iterative reconstruction techniques.   FINDINGS: Please note that the study is limited without intravenous contrast.   CHEST: Bilateral pleural effusions are present with right effusion moderately small in size and with the left effusion  moderately small in size as well. There is shift of the heart and mediastinum to the left of midline due to atelectasis of the left upper lobe. There is consolidation throughout left lower lobe with air bronchograms noted. On the right, there is compressive atelectasis seen posteriorly in the right lower lobe.   There is mild reactive mediastinal adenopathy seen at this time with mediastinal lymph nodes increased in size since prior study. Several of these mediastinal nodes are at the upper limits of normal measuring 8 mm in short axis diameter.   No pericardial effusion is present. The cardiac size is normal with mitral annulus calcification.   There has been prior bilateral mastectomy with reconstruction of the left breast with implant placement. Surgical clips are visible within the left axilla. Stent grafts are visible within the left upper extremity and within the left innominate, subclavian as well as axillary veins.   A peripherally calcified 1.8 cm nodule arises from the lower pole of left thyroid lobe.   ABDOMEN:   LIVER: Unremarkable.   BILE DUCTS: No intrahepatic or extrahepatic biliary ductal dilatation is seen.   GALLBLADDER: Cholelithiasis is observed with some calcification of the gallbladder wall demonstrated as well. No gallbladder wall thickening or pericholecystic fluid is evident.   PANCREAS: Unremarkable   SPLEEN: Unremarkable   ADRENAL GLANDS: Unremarkable   KIDNEYS AND URETERS: Kidneys are small in size with extensive renal artery calcification demonstrated.   PELVIS:   BLADDER: Amos catheter is present within the decompressed urinary bladder.   REPRODUCTIVE ORGANS: Calcification of the arcuate arteries within the uterus is seen. There is no uterine enlargement or adnexal mass.   BOWEL: A rectal balloon is seen. There is no abnormal distention of the intestinal tract.   VESSELS: There is atherosclerosis of the thoracoabdominal aorta and iliac arteries with calcification in the walls of the  celiac, splenic, hepatic, and mesenteric arteries.   PERITONEUM/RETROPERITONEUM/LYMPH NODES: There is a minimal amount of ascites seen about the liver and spleen with mild presacral edema noted.   ABDOMINAL WALL: There is anasarca involving the soft tissues of the abdomen and pelvis. Postoperative change from ventral hernia repair is seen.   BONES: Bilateral sacroiliitis is seen. There is lumbar dextroscoliosis. Chronic rotator cuff tear is present bilaterally with osteoarthritis of both shoulders, right greater than left.       Chest 1.  Moderately small bilateral pleural effusions with left upper lobe atelectasis and compressive atelectasis seen posteriorly in right lower lobe. A left lower lobe pneumonia is identified. There is extensive airspace consolidation within left lower lobe with air bronchograms observed. Mild mediastinal reactive adenopathy is present as well.   Abdomen-Pelvis 1.  Cholelithiasis without evidence of cholecystitis. 2. Small amount of ascites with mild presacral edema and anasarca within the soft tissues of the abdominal wall. 3. Renal atrophy with extensive vascular calcifications.     MACRO: None   Signed by: Maria Garcia 1/21/2024 8:30 AM Dictation workstation:   [id number]    CT head wo IV contrast    Result Date: 1/21/2024  Interpreted By:  Maria Garcia, STUDY: CT HEAD WO IV CONTRAST 1/20/2024 11:42 pm   INDICATION: Signs/Symptoms:mental status changes   COMPARISON: 12/11/2023   ACCESSION NUMBER(S): [id number]   ORDERING CLINICIAN: BAN GIFFORD   TECHNIQUE: Unenhanced axial images of the brain are completed.   All CT examinations are performed with 1 or more of the following dose reduction techniques: Automated exposure control, adjustment of mA and/or kv according to patient's size, or use of iterative reconstruction techniques.   FINDINGS: Helical unenhanced axial images of the brain demonstrate a mild to moderate degree of ventricular enlargement with proportionate widening of the  sulci and sylvian fissures. There is no midline shift, mass effect, extra-axial fluid collection, or acute intracranial hemorrhage. There is diminished density seen in the periventricular white matter indicating chronic microvascular ischemic disease. There is calcified plaque seen within the distal vertebral and internal carotid arteries bilaterally. No calvarial abnormality is seen.       Atrophy and chronic microvascular ischemic disease without acute intracranial process.   Signed by: Maria Garcia 1/21/2024 8:09 AM Dictation workstation:   [id number]    XR chest 1 view    Result Date: 1/20/2024  Interpreted By:  Brendon Perez, STUDY: XR CHEST 1 VIEW; 1/20/2024 5:03 pm   INDICATION: CLINICAL INFORMATION: Signs/Symptoms:Insertion right IJ central line, also left thoracentesis.   COMPARISON: 01/19/2024 at 1338 hours   ACCESSION NUMBER(S): [id number]   ORDERING CLINICIAN: BOZENA ANTONIO   TECHNIQUE: Portable chest one view.   FINDINGS: The cardiac size is indeterminate in view of the AP projection. There is no change in the right-sided dual port central venous catheter. There is a new right internal jugular venous catheter present with the tip at approximately same level as the dual port central venous catheter, overlying the mid to lower right atrium. There is no evidence for pneumothorax. Patient has a history of left thoracentesis without evidence for pneumothorax on the left. There is bilateral basilar alveolar infiltrate and effusions. Left effusion is decreased compared to the study from 1 day earlier.   Surgical clips are identified within left chest wall. Endovascular stent is identified in the distribution of the left subclavian artery.       1. Status post right-sided central venous catheter placement as described above with the tip overlying the mid to caudal aspect of the right atrium. There is no evidence for pneumothorax. 2. No evidence for left-sided pneumothorax after left-sided thoracentesis.  Decrease in the left effusion. 3. Bilateral basilar infiltrates and effusions are present. Follow-up to assure complete clearing is suggested.   MACRO: none   Signed by: Brendon Perez 1/20/2024 5:11 PM Dictation workstation:   [id number]    XR chest 1 view    Result Date: 1/19/2024  Interpreted By:  Real Mendieta, STUDY: XR CHEST 1 VIEW; 1/19/2024 1:38 pm   INDICATION: Signs/Symptoms:dyspnea.   COMPARISON: 12/26/2023   ACCESSION NUMBER(S): [id number]   ORDERING CLINICIAN: EMELY GOLDSMITH   TECHNIQUE: 1 view of the chest was performed.   FINDINGS: There may be a minimal right pleural effusion. Slight prominence of the interstitium otherwise the right lung is clear. Improved appearance of the left lung with improved aeration of the right upper lobe. There is opacification of the left lower lobe possibly due to large pleural effusion which is similar to the prior study. No pneumothorax. Right-sided dual lumen central line catheter with tip at the proximal atrium. The cardiomediastinal silhouette is within normal limits. Left-sided subclavian stents are again noted.       Improved aeration and appearance of the left lung superiorly however there is a persistent large left pleural effusion and opacification of the left lower lobe.   Signed by: Real Mendieta 1/19/2024 1:44 PM Dictation workstation:   [id number]         Assessment / Plan       PROBLEM LIST:  - End stage heart failure with preserved EF - on vasopressors  - End stage renal disease on HD  - Cardiogenic shock - will continue to wean down levo her blood pressure measured 1 does not reflect of the actual blood pressure as the patient is fully awake despite readings of systolic in the 40s and 50s arterial line was also inaccurate due to vasculopathy  - Acute metabolic encephalopathy  - Failure to wean from vasopressors  - Goals of care discussion    MANAGEMENT PLAN:  - Start weaning levo and assess mental status  - HD per renal    -Already on Fluorinef  tid  - Palliative care consult  - Cardiology consultation - patient's daughter declined a RHC  - Peer to peer done with insurance, NOT approved  - Family will update us with goals of care  - Cont Doxy and Vanc PO. Meropenem pseudomonas in swab culture from right heal   Appreciate ID consult.  - Routine ICU care  - PT/OT  Overall prognosis poor due to chronic medical problems including end-stage renal disease heart failure patient is bedridden with decubitus ulcers  I have personally interviewed and examined the patient.  I have personally verified elements of the exam listed above. Interval changes or irregularities are as noted.  I have personally and independently reviewed laboratory, radiographic and procedural data.  I have personally reviewed the problem list above and concur. Changes, if any, are noted.  I have personally reviewed the plan list above and concur. Changes, if any, are noted.    The patient has high probability of compromise including but not limited to organ system failure, mechanical respiratory and circulatory support, cardiac arrest and death. I have discussed this in detail with the family / caregivers.    No change in plan overall    Tarek Gharibeh MD

## 2024-02-18 NOTE — CARE PLAN
Problem: Pain  Goal: My pain/discomfort is manageable  Outcome: Progressing     Problem: Safety  Goal: Patient will be injury free during hospitalization  Outcome: Progressing  Goal: I will remain free of falls  Outcome: Progressing     Problem: Daily Care  Goal: Daily care needs are met  Outcome: Progressing     Problem: Psychosocial Needs  Goal: Demonstrates ability to cope with hospitalization/illness  Outcome: Progressing  Goal: Collaborate with me, my family, and caregiver to identify my specific goals  Outcome: Progressing     Problem: Discharge Barriers  Goal: My discharge needs are met  Outcome: Progressing     Problem: Fall/Injury  Goal: Not fall by end of shift  Outcome: Progressing  Goal: Be free from injury by end of the shift  Outcome: Progressing  Goal: Verbalize understanding of personal risk factors for fall in the hospital  Outcome: Progressing  Goal: Verbalize understanding of risk factor reduction measures to prevent injury from fall in the home  Outcome: Progressing  Goal: Pace activities to prevent fatigue by end of the shift  Outcome: Progressing     Problem: Skin  Goal: Decreased wound size/increased tissue granulation at next dressing change  Outcome: Progressing  Goal: Participates in plan/prevention/treatment measures  Outcome: Progressing  Goal: Prevent/manage excess moisture  Outcome: Progressing  Goal: Prevent/minimize sheer/friction injuries  Outcome: Progressing  Goal: Promote/optimize nutrition  Outcome: Progressing  Goal: Promote skin healing  Outcome: Progressing     Problem: Pain  Goal: Takes deep breaths with improved pain control throughout the shift  Outcome: Progressing  Goal: Turns in bed with improved pain control throughout the shift  Outcome: Progressing  Goal: Walks with improved pain control throughout the shift  Outcome: Progressing  Goal: Performs ADL's with improved pain control throughout shift  Outcome: Progressing     Problem: Respiratory  Goal: Clear secretions  with interventions this shift  Outcome: Progressing  Goal: Minimize anxiety/maximize coping throughout shift  Outcome: Progressing  Goal: Minimal/no exertional discomfort or dyspnea this shift  Outcome: Progressing  Goal: Patent airway maintained this shift  Outcome: Progressing  Goal: Tolerate pulmonary toileting this shift  Outcome: Progressing  Goal: Verbalize decreased shortness of breath this shift  Outcome: Progressing  Goal: Increase self care and/or family involvement in next 24 hours  Outcome: Progressing     Problem: Diabetes  Goal: Maintain electrolyte levels within acceptable range throughout shift  Outcome: Progressing  Goal: Maintain glucose levels >70mg/dl to <250mg/dl throughout shift  Outcome: Progressing  Goal: No changes in neurological exam by end of shift  Outcome: Progressing  Goal: Learn about and adhere to nutrition recommendations by end of shift  Outcome: Progressing  Goal: Vital signs within normal range for age by end of shift  Outcome: Progressing  Goal: Increase self care and/or family involovement by end of shift  Outcome: Progressing  Goal: Receive DSME education by end of shift  Outcome: Progressing     Problem: Discharge Planning  Goal: Discharge to home or other facility with appropriate resources  Outcome: Progressing     Problem: Chronic Conditions and Co-morbidities  Goal: Patient's chronic conditions and co-morbidity symptoms are monitored and maintained or improved  Outcome: Progressing   The patient's goals for the shift include Visit with family.    The clinical goals for the shift include monitor vital signs    Over the shift, the patient did not make progress toward the following goals. Barriers to progression include . Recommendations to address these barriers include .

## 2024-02-18 NOTE — CARE PLAN
Problem: Pain  Goal: My pain/discomfort is manageable  2/18/2024 0933 by Rashad Lopez RN  Outcome: Progressing  2/18/2024 0932 by Rashad Lopez RN  Outcome: Progressing     Problem: Safety  Goal: Patient will be injury free during hospitalization  2/18/2024 0933 by Rashad Lopez RN  Outcome: Progressing  2/18/2024 0932 by Rashad Lopez RN  Outcome: Progressing  Goal: I will remain free of falls  2/18/2024 0933 by Rashad Lopez RN  Outcome: Progressing  2/18/2024 0932 by Rashad Lopez RN  Outcome: Progressing     Problem: Daily Care  Goal: Daily care needs are met  2/18/2024 0933 by Rashad Lopez RN  Outcome: Progressing  2/18/2024 0932 by Rashad Lopez RN  Outcome: Progressing     Problem: Psychosocial Needs  Goal: Demonstrates ability to cope with hospitalization/illness  2/18/2024 0933 by Rashad Lopez RN  Outcome: Progressing  2/18/2024 0932 by Rashad Lopez RN  Outcome: Progressing  Goal: Collaborate with me, my family, and caregiver to identify my specific goals  2/18/2024 0933 by Rashad Lopez RN  Outcome: Progressing  2/18/2024 0932 by Rashad Lopez RN  Outcome: Progressing     Problem: Discharge Barriers  Goal: My discharge needs are met  2/18/2024 0933 by Rashad Lopez RN  Outcome: Progressing  2/18/2024 0932 by Rashad Lopez RN  Outcome: Progressing     Problem: Fall/Injury  Goal: Not fall by end of shift  2/18/2024 0933 by Rashad Lopez RN  Outcome: Progressing  2/18/2024 0932 by Rashad Lopez RN  Outcome: Progressing  Goal: Be free from injury by end of the shift  2/18/2024 0933 by Rashad Lopez RN  Outcome: Progressing  2/18/2024 0932 by Rashad Lopez RN  Outcome: Progressing  Goal: Verbalize understanding of personal risk factors for fall in the hospital  2/18/2024 0933 by Rashad Lopez RN  Outcome: Progressing  2/18/2024 0932 by Rashad MORGAN  FLAQUITO Lopez  Outcome: Progressing  Goal: Verbalize understanding of risk factor reduction measures to prevent injury from fall in the home  2/18/2024 0933 by Rashad Lopez RN  Outcome: Progressing  2/18/2024 0932 by Rashad Lopez RN  Outcome: Progressing  Goal: Pace activities to prevent fatigue by end of the shift  2/18/2024 0933 by Rashad Lopez RN  Outcome: Progressing  2/18/2024 0932 by Rashad Lopez RN  Outcome: Progressing     Problem: Skin  Goal: Decreased wound size/increased tissue granulation at next dressing change  2/18/2024 0933 by Rashad Lopez RN  Outcome: Progressing  2/18/2024 0932 by Rashad Lopez RN  Outcome: Progressing  Goal: Participates in plan/prevention/treatment measures  2/18/2024 0933 by Rashad Lopez RN  Outcome: Progressing  2/18/2024 0932 by Rashad Lopez RN  Outcome: Progressing  Goal: Prevent/manage excess moisture  2/18/2024 0933 by Rashad Lopez RN  Outcome: Progressing  2/18/2024 0932 by Rashad Lopez RN  Outcome: Progressing  Goal: Prevent/minimize sheer/friction injuries  2/18/2024 0933 by Rashad Lopez RN  Outcome: Progressing  2/18/2024 0932 by Rashad Lopez RN  Outcome: Progressing  Goal: Promote/optimize nutrition  2/18/2024 0933 by Rashad Lopez RN  Outcome: Progressing  2/18/2024 0932 by Rashad Lopez RN  Outcome: Progressing  Goal: Promote skin healing  2/18/2024 0933 by Rashad Lopez RN  Outcome: Progressing  2/18/2024 0932 by Rashad Lopez RN  Outcome: Progressing     Problem: Pain  Goal: Takes deep breaths with improved pain control throughout the shift  2/18/2024 0933 by Rashad Lopez RN  Outcome: Progressing  2/18/2024 0932 by Rashad Lopez RN  Outcome: Progressing  Goal: Turns in bed with improved pain control throughout the shift  2/18/2024 0933 by Rashad Lopez RN  Outcome: Progressing  2/18/2024 0932 by Rashad  EDDIE Lopez RN  Outcome: Progressing  Goal: Walks with improved pain control throughout the shift  2/18/2024 0933 by Rashad Lopez RN  Outcome: Progressing  2/18/2024 0932 by Rashad Lopez RN  Outcome: Progressing  Goal: Performs ADL's with improved pain control throughout shift  2/18/2024 0933 by Rashad Lopez RN  Outcome: Progressing  2/18/2024 0932 by Rashad Lopez RN  Outcome: Progressing     Problem: Respiratory  Goal: Clear secretions with interventions this shift  2/18/2024 0933 by Rashad Lopez RN  Outcome: Progressing  2/18/2024 0932 by Rashad Lopez RN  Outcome: Progressing  Goal: Minimize anxiety/maximize coping throughout shift  2/18/2024 0933 by Rashad Lopez RN  Outcome: Progressing  2/18/2024 0932 by Rashad Lopez RN  Outcome: Progressing  Goal: Minimal/no exertional discomfort or dyspnea this shift  2/18/2024 0933 by Rashad Lopez RN  Outcome: Progressing  2/18/2024 0932 by Rashad Lopez RN  Outcome: Progressing  Goal: Patent airway maintained this shift  2/18/2024 0933 by Rashad Lopez RN  Outcome: Progressing  2/18/2024 0932 by Rashad Lopez RN  Outcome: Progressing  Goal: Tolerate pulmonary toileting this shift  2/18/2024 0933 by Rashad Lopez RN  Outcome: Progressing  2/18/2024 0932 by Rashad Loepz RN  Outcome: Progressing  Goal: Verbalize decreased shortness of breath this shift  2/18/2024 0933 by Rashad Lopez RN  Outcome: Progressing  2/18/2024 0932 by Rashad Lopez RN  Outcome: Progressing  Goal: Increase self care and/or family involvement in next 24 hours  2/18/2024 0933 by Rashad Lopez RN  Outcome: Progressing  2/18/2024 0932 by Rashad Lopez RN  Outcome: Progressing     Problem: Diabetes  Goal: Maintain electrolyte levels within acceptable range throughout shift  2/18/2024 0933 by Rashad Lopez RN  Outcome: Progressing  2/18/2024 0932  by Rashad Lopez RN  Outcome: Progressing  Goal: Maintain glucose levels >70mg/dl to <250mg/dl throughout shift  2/18/2024 0933 by Rashad Lopez RN  Outcome: Progressing  2/18/2024 0932 by Rashad Lpoez RN  Outcome: Progressing  Goal: No changes in neurological exam by end of shift  2/18/2024 0933 by Rashad Lopez RN  Outcome: Progressing  2/18/2024 0932 by Rashad Lopez RN  Outcome: Progressing  Goal: Learn about and adhere to nutrition recommendations by end of shift  2/18/2024 0933 by Rashad Lopez RN  Outcome: Progressing  2/18/2024 0932 by Rashad Lopez RN  Outcome: Progressing  Goal: Vital signs within normal range for age by end of shift  2/18/2024 0933 by Rashad Lopez RN  Outcome: Progressing  2/18/2024 0932 by Rashad Lopez RN  Outcome: Progressing  Goal: Increase self care and/or family involovement by end of shift  2/18/2024 0933 by Rashad Lopez RN  Outcome: Progressing  2/18/2024 0932 by Rashad Lopez RN  Outcome: Progressing  Goal: Receive DSME education by end of shift  2/18/2024 0933 by Rashad Lopez RN  Outcome: Progressing  2/18/2024 0932 by Rashad Lopez RN  Outcome: Progressing     Problem: Discharge Planning  Goal: Discharge to home or other facility with appropriate resources  2/18/2024 0933 by Rashad Lopez RN  Outcome: Progressing  2/18/2024 0932 by Rashad Lopez RN  Outcome: Progressing     Problem: Chronic Conditions and Co-morbidities  Goal: Patient's chronic conditions and co-morbidity symptoms are monitored and maintained or improved  2/18/2024 0933 by Rashad Lopez RN  Outcome: Progressing  2/18/2024 0932 by Rashad Lopez RN  Outcome: Progressing   The patient's goals for the shift include Visit with family.    The clinical goals for the shift include monitor vital signs    Over the shift, the patient did not make progress toward the following goals.  Barriers to progression include . Recommendations to address these barriers include .

## 2024-02-19 NOTE — PROCEDURES
Pt has a R chest dialysis catheter, drsg dry and intact (dated 2/13) without any redness, swelling or drainage. Pt also has a dual lumen picc line, drsg very loose and coming off around the edges dated 2/15, will change drsg. Drsg has been changed using sterile technique, site without any redness, swelling or drainage, external length is 0cm. Both blue caps have been changed, brisk blood return noted to both lumens and they both flush easily with NS, red lumen reconnected to medications and curos cap applied to the purple lumen.

## 2024-02-19 NOTE — CARE PLAN
The patient's goals for the shift include Visit with family.    The clinical goals for the shift include monitor vital signs      Problem: Pain  Goal: My pain/discomfort is manageable  Outcome: Progressing     Problem: Safety  Goal: Patient will be injury free during hospitalization  Outcome: Progressing  Goal: I will remain free of falls  Outcome: Progressing     Problem: Daily Care  Goal: Daily care needs are met  Outcome: Progressing     Problem: Psychosocial Needs  Goal: Demonstrates ability to cope with hospitalization/illness  Outcome: Progressing  Goal: Collaborate with me, my family, and caregiver to identify my specific goals  Outcome: Progressing     Problem: Discharge Barriers  Goal: My discharge needs are met  Outcome: Progressing     Problem: Fall/Injury  Goal: Not fall by end of shift  Outcome: Progressing  Goal: Be free from injury by end of the shift  Outcome: Progressing  Goal: Verbalize understanding of personal risk factors for fall in the hospital  Outcome: Progressing  Goal: Verbalize understanding of risk factor reduction measures to prevent injury from fall in the home  Outcome: Progressing  Goal: Pace activities to prevent fatigue by end of the shift  Outcome: Progressing     Problem: Skin  Goal: Decreased wound size/increased tissue granulation at next dressing change  Outcome: Progressing  Goal: Participates in plan/prevention/treatment measures  Outcome: Progressing  Goal: Prevent/manage excess moisture  Outcome: Progressing  Goal: Prevent/minimize sheer/friction injuries  Outcome: Progressing  Goal: Promote/optimize nutrition  Outcome: Progressing  Goal: Promote skin healing  Outcome: Progressing     Problem: Pain  Goal: Takes deep breaths with improved pain control throughout the shift  Outcome: Progressing  Goal: Turns in bed with improved pain control throughout the shift  Outcome: Progressing  Goal: Walks with improved pain control throughout the shift  Outcome: Progressing  Goal:  Performs ADL's with improved pain control throughout shift  Outcome: Progressing     Problem: Respiratory  Goal: Clear secretions with interventions this shift  Outcome: Progressing  Goal: Minimize anxiety/maximize coping throughout shift  Outcome: Progressing  Goal: Minimal/no exertional discomfort or dyspnea this shift  Outcome: Progressing  Goal: Patent airway maintained this shift  Outcome: Progressing  Goal: Tolerate pulmonary toileting this shift  Outcome: Progressing  Goal: Verbalize decreased shortness of breath this shift  Outcome: Progressing  Goal: Increase self care and/or family involvement in next 24 hours  Outcome: Progressing  Goal: No signs of respiratory distress (eg. Use of accessory muscles. Peds grunting)  Outcome: Progressing     Problem: Diabetes  Goal: Maintain electrolyte levels within acceptable range throughout shift  Outcome: Progressing  Goal: Maintain glucose levels >70mg/dl to <250mg/dl throughout shift  Outcome: Progressing  Goal: No changes in neurological exam by end of shift  Outcome: Progressing  Goal: Learn about and adhere to nutrition recommendations by end of shift  Outcome: Progressing  Goal: Vital signs within normal range for age by end of shift  Outcome: Progressing  Goal: Increase self care and/or family involovement by end of shift  Outcome: Progressing  Goal: Receive DSME education by end of shift  Outcome: Progressing     Problem: Discharge Planning  Goal: Discharge to home or other facility with appropriate resources  Outcome: Progressing     Problem: Chronic Conditions and Co-morbidities  Goal: Patient's chronic conditions and co-morbidity symptoms are monitored and maintained or improved  Outcome: Progressing

## 2024-02-19 NOTE — PROGRESS NOTES
Ayanna Gross is a 73 y.o. female on day 31 of admission presenting with Septic shock (CMS/HCC).    Subjective   Interval History:        afebrile, no chills  System  present  Off pressors  Afebrile, no chills  Review of Systems   All other systems reviewed and are negative.      Objective   Range of Vitals (last 24 hours)  Heart Rate:  [85-97]   Temp:  [35.7 °C (96.3 °F)-36.6 °C (97.9 °F)]   Resp:  [14-25]   BP: (47-65)/(30-51)   Weight:  [91.4 kg (201 lb 8 oz)-99.3 kg (218 lb 14.7 oz)]   SpO2:  [93 %-100 %]   Daily Weight  02/19/24 : 97.9 kg (215 lb 13.3 oz)    Body mass index is 39.48 kg/m².    Physical Exam  Constitutional:       Appearance: Awake, alert  HENT:      Head: Normocephalic and atraumatic.      Nose: Nose normal.   Eyes:      Extraocular Movements: Extraocular movements intact.      Conjunctiva/sclera: Conjunctivae normal.   Cardiovascular:      Heart sounds: Normal heart sounds, S1 normal and S2 normal.   Pulmonary:      Breath sounds: Decreased breath sounds present.   Abdominal:      General: Bowel sounds are normal.      Palpations: Abdomen is soft.   Musculoskeletal:      Cervical back: Normal range of motion and neck supple.   Skin:     Comments: Bilateral posterior heel eschar -photos examined  Neurological:      Mental Status: She is awake, alert    Antibiotics  aspirin tablet 325 mg  acetaminophen (Tylenol) tablet 650 mg  cefepime (Maxipime) 1 g in dextrose 5 % 50 mL IV  sodium chloride 0.9 % bolus 2,229 mL  apixaban (Eliquis) tablet 2.5 mg  escitalopram (Lexapro) tablet 10 mg  febuxostat (Uloric) tablet 40 mg  fenofibrate (Triglide) tablet 160 mg  gabapentin (Neurontin) capsule 100 mg  midodrine (Proamatine) tablet 15 mg  nystatin (Mycostatin) 100,000 unit/gram powder  oxyCODONE-acetaminophen (Percocet) 5-325 mg per tablet 1 tablet  simvastatin (Zocor) tablet 20 mg  piperacillin-tazobactam-dextrose (Zosyn) IV 2.25 g  vancomycin (Vancocin) capsule 125 mg  oxygen (O2)  therapy  dextrose 50 % injection 25 g  glucagon (Glucagen) injection 1 mg  dextrose 10 % in water (D10W) infusion  insulin lispro (HumaLOG) injection 0-10 Units  nystatin (Mycostatin) 100,000 unit/gram powder 1 Application  vancomycin-diluent combo no.1 (Xellia) IVPB 1,750 mg  piperacillin-tazobactam-dextrose (Zosyn) IV 2.25 g  sodium chloride 0.9 % bolus 500 mL  norepinephrine (Levophed) 8 mg in dextrose 5% 250 mL (0.032 mg/mL) infusion (premix)  vancomycin (Vancocin) placeholder  pantoprazole (ProtoNix) EC tablet 40 mg  pantoprazole (ProtoNix) injection 40 mg  sennosides-docusate sodium (Judy-Colace) 8.6-50 mg per tablet 1 tablet  doxycycline (Vibramycin) in dextrose 5 % in water (D5W) 100 mL  mg  LORazepam (Ativan) injection 2 mg  magnesium sulfate IV 2 g  zinc oxide 20 % ointment 1 Application  nystatin (Mycostatin) cream  norepinephrine (Levophed) 8 mg in dextrose 5% 250 mL (0.032 mg/mL) infusion (premix)  heparin 1,000 unit/mL injection 2,000 Units  heparin 1,000 unit/mL injection 2,000 Units  nystatin (Mycostatin) ointment  acetaminophen (Tylenol) oral liquid 1,000 mg  albumin human 25 % solution 12.5 g  perflutren lipid microspheres (Definity) injection 0.5-10 mL of dilution  sulfur hexafluoride microsphr (Lumason) injection 24.28 mg  perflutren protein A microsphere (Optison) injection 0.5 mL  ipratropium-albuteroL (Duo-Neb) 0.5-2.5 mg/3 mL nebulizer solution 3 mL  sodium chloride 3 % nebulizer solution 3 mL  vancomycin-diluent combo no.1 (Xellia) IVPB 750 mg  sennosides-docusate sodium (Judy-Colace) 8.6-50 mg per tablet 1 tablet  acetaminophen (Tylenol) tablet 650 mg  doxycycline (Vibramycin) capsule 100 mg  magnesium oxide (Mag-Ox) tablet 400 mg  vasopressin (Vasostrict) 0.2 unit/mL infusion  norepinephrine (Levophed) 8 mg in dextrose 5% 250 mL (0.032 mg/mL) infusion (premix)  heparin 1,000 unit/mL injection 2,000 Units  heparin 1,000 unit/mL injection 2,000 Units  albumin human 25 % solution 12.5  g  vancomycin (Xellia) 1 g in 200 mL (Xellia) IVPB 1 g  ipratropium-albuteroL (Duo-Neb) 0.5-2.5 mg/3 mL nebulizer solution 3 mL  sodium chloride 3 % nebulizer solution 3 mL  albumin human 5 % infusion 12.5 g  heparin 1,000 unit/mL injection 2,000 Units  heparin 1,000 unit/mL injection 2,000 Units  vancomycin (Vancocin) capsule 125 mg  albumin human 25 % solution 12.5 g  heparin 1,000 unit/mL injection 2,000 Units  heparin 1,000 unit/mL injection 2,000 Units  ipratropium-albuteroL (Duo-Neb) 0.5-2.5 mg/3 mL nebulizer solution 3 mL  lidocaine (Xylocaine) 10 mg/mL (1 %) injection 50 mg  sodium phosphate 15 mmol in sodium chloride 0.9% 250 mL IV  sod phos di, mono-K phos mono (K Phos Neutral) tablet 250 mg  potassium, sodium phosphates (Phos-NaK) 280-160-250 mg packet 1 packet  doxycycline (Vibramycin) capsule 100 mg  fludrocortisone (Florinef) tablet 0.1 mg  gabapentin (Neurontin) capsule 100 mg  acetaminophen (Tylenol) tablet 1,000 mg  benzocaine-menthoL (Dermoplast) topical spray  vancomycin (Vancocin) capsule 125 mg  heparin 1,000 unit/mL injection 2,000 Units  heparin 1,000 unit/mL injection 2,000 Units  magnesium sulfate IV 2 g  albumin human 25 % solution 12.5 g  promethazine (Phenergan) injection 12.5 mg  ondansetron (Zofran) injection 4 mg  lidocaine (Xylocaine) 10 mg/mL (1 %) injection 50 mg  alteplase (Cathflo Activase) injection 2 mg  acetaminophen (Tylenol) tablet 650 mg  psyllium (Metamucil) 3.4 gram packet 1 packet  perflutren lipid microspheres (Definity) injection 0.5-10 mL of dilution  sulfur hexafluoride microsphr (Lumason) injection 24.28 mg  perflutren protein A microsphere (Optison) injection 0.5 mL  epoetin di-epbx (Retacrit) injection 10,000 Units  sulfur hexafluoride microsphr (Lumason) injection 24.28 mg  perflutren lipid microspheres (Definity) injection 0.5-10 mL of dilution  perflutren lipid microspheres (Definity) injection 0.5-10 mL of dilution  sulfur hexafluoride microsphr (Lumason)  injection 24.28 mg  perflutren protein A microsphere (Optison) injection 0.5 mL  ammonium lactate (Lac-Hydrin) 12 % lotion 1 Application  norepinephrine (Levophed) 8 mg in dextrose 5% 250 mL (0.032 mg/mL) infusion (premix)  fludrocortisone (Florinef) tablet 0.1 mg  albumin human 25 % solution 25 g  albumin human 25 % solution 25 g  meropenem (Merrem) 500 mg in sodium chloride 0.9 % 100 mL IV  potassium chloride 20 mEq in 100 mL IV premix  potassium chloride 20 mEq in 100 mL IV premix  magnesium sulfate IV 2 g  cefepime (Maxipime) 1 g in dextrose 5 % 50 mL IV  vancomycin-diluent combo no.1 (Xellia) IVPB 750 mg  albumin human 25 % solution 12.5 g  vancomycin (Xellia) 1 g in 200 mL (Xellia) IVPB 1 g  vancomycin (Vancocin) placeholder  vancomycin (Xellia) 1 g in 200 mL (Xellia) IVPB 1 g  midodrine (Proamatine) tablet 20 mg  vancomycin-diluent combo no.1 (Xellia) IVPB 750 mg      Relevant Results  Labs  Results from last 72 hours   Lab Units 02/19/24  0501 02/18/24  0500 02/17/24  0642   WBC AUTO x10*3/uL 12.8* 13.3* 13.2*   HEMOGLOBIN g/dL 8.0* 8.0* 8.1*   HEMATOCRIT % 25.6* 26.1* 26.2*   PLATELETS AUTO x10*3/uL 218 198 155     Results from last 72 hours   Lab Units 02/19/24  0501 02/18/24  0500 02/17/24  0642   SODIUM mmol/L 132* 132* 137   POTASSIUM mmol/L 3.4 3.2* 3.2*   CHLORIDE mmol/L 99 98 101   CO2 mmol/L 18* 20* 22*   BUN mg/dL 36* 29* 21   CREATININE mg/dL 3.20* 2.70* 2.30*   GLUCOSE mg/dL 184* 216* 207*   CALCIUM mg/dL 8.9 9.0 8.7   ANION GAP mmol/L 15 14 14   EGFR mL/min/1.73m*2 15* 18* 22*         Estimated Creatinine Clearance: 17.6 mL/min (A) (by C-G formula based on SCr of 3.2 mg/dL (H)).  C-Reactive Protein   Date Value Ref Range Status   12/25/2023 5.20 (H) 0.00 - 2.00 mg/dL Final     CRP   Date Value Ref Range Status   06/23/2023 19.9 (H) 0 - 2.0 MG/DL Final     Comment:     Performed at [address] [address]   05/22/2022 1.0 0 - 2.0 MG/DL Final     Comment:     Performed at  [place] [id number]     Microbiology  Susceptibility data from last 14 days.  Collected Specimen Info Organism Ampicillin Aztreonam Cefazolin Cefepime Ceftazidime Ciprofloxacin Clindamycin Erythromycin Gentamicin Levofloxacin Oxacillin Piperacillin/Tazobactam Tetracycline Tobramycin   02/10/24 Tissue/Biopsy from Other (specify in comments) Proteus mirabilis S  S   S   S S  S R      Methicillin Resistant Staphylococcus aureus (MRSA)       R R   R  R      Pseudomonas aeruginosa                 02/10/24 Tissue/Biopsy from Wound/Tissue Proteus mirabilis S  S   S   S S  S R      Pseudomonas aeruginosa  S  S S S    S  S  S     Collected Specimen Info Organism Trimethoprim/Sulfamethoxazole Vancomycin   02/10/24 Tissue/Biopsy from Other (specify in comments) Proteus mirabilis S      Methicillin Resistant Staphylococcus aureus (MRSA) S S     Pseudomonas aeruginosa     02/10/24 Tissue/Biopsy from Wound/Tissue Proteus mirabilis S      Pseudomonas aeruginosa       Imaging  Transthoracic Echo (TTE) Limited    Result Date: 2/7/2024           [place] [id number]            Phone [phone] TRANSTHORACIC ECHOCARDIOGRAM REPORT  Patient Name:      BASIA Gray Physician:    [id number] Littlejohn DO Study Date:        2/7/2024            Ordering Provider:    [id number] LITTLEJOHN MRN/PID:           [id number]            Fellow: Accession#:        [id number]        Nurse: Date of Birth/Age: 1950 / 73 years Sonographer:          Rena QUARLES,                                                              FABIAN CONLEY Gender:            F                   Additional Staff: Height:            157.48 cm           Admit Date: Weight:                                 Admission Status:     Inpatient - Routine BSA:               m2                  Department Location:  [facility] ICU Blood Pressure: 95 /42 mmHg Study Type:    TRANSTHORACIC ECHO (TTE) LIMITED Diagnosis/ICD: Hypotension, unspecified-I95.9; Mitral valve disorder-I05.9 Indication:    Limited to assess for MS, Hypotension CPT Codes:     Echo Limited-76111; Color Doppler-98461; Doppler Limited-32671 Patient History: Pertinent History: Hypotension, hx of MV endocarditis, PAD, DM2 ESRD on Hd. Study Detail: The following Echo studies were performed: 2D, M-Mode, Doppler and               color flow. Technically challenging study due to poor acoustic               windows and L Breast implant. Unable to obtain suprasternal notch               view.  PHYSICIAN INTERPRETATION: Left Ventricle: Left ventricular systolic function is normal. There are no regional wall motion abnormalities. The left ventricular cavity size is normal. Left ventricular diastolic filling was not assessed. Left Atrium: The left atrium is moderate to severely dilated. Right Ventricle: The right ventricle is normal in size. There is normal right ventricular global systolic function. Right Atrium: The right atrium was not well visualized. Aortic Valve: The aortic valve was not assessed. Aortic valve regurgitation was not assessed. Mitral Valve: The mitral valve is abnormal. There is evidence of mild mitral valve stenosis. The doppler estimated mean and peak diastolic pressure gradients are 4.0 mmHg and 6.6 mmHg respectively. There is severe mitral annular calcification. There is no evidence of mitral valve regurgitation. Tricuspid Valve: The tricuspid valve was not assessed. Tricuspid regurgitation was not assessed. Pulmonic Valve: The pulmonic valve is not well visualized. The pulmonic valve regurgitation was not assessed. Pericardium: There is no pericardial effusion noted. Aorta: The aortic root was not assessed.  CONCLUSIONS:  1. Left ventricular  systolic function is normal.  2. The left atrium is moderate to severely dilated.  3. There is severe mitral annular calcification. QUANTITATIVE DATA SUMMARY: LV DIASTOLIC FUNCTION:                        Normal Ranges: MV Peak E:    1.23 m/s (0.7-1.2 m/s) MV Peak A:    1.12 m/s (0.42-0.7 m/s) E/A Ratio:    1.10     (1.0-2.2) MV lateral e' 0.04 m/s MV medial e'  0.07 m/s MITRAL VALVE:                      Normal Ranges: MV Vmax:    1.28 m/s (<=1.3m/s) MV peak P.6 mmHg (<5mmHg) MV mean P.0 mmHg (<48mmHg)  RIGHT VENTRICLE: RV Basal 2.97 cm  [id number] Lemus DO Electronically signed on 2024 at 3:50:21 PM  ** Final **     Transthoracic Echo (TTE) Limited    Result Date: 2024           [place] [address]            Phone [phone] TRANSTHORACIC ECHOCARDIOGRAM REPORT  Patient Name:      BASIATRAVIS Gray Physician:    [id number] Lemus DO Study Date:        2024            Ordering Provider:    [id number] REBECA RESENDEZ MRN/PID:           [id number]            Fellow: Accession#:        [id number]        Nurse: Date of Birth/Age: 1950 / 73 years Sonographer:          Jennifer WALL Gender:            F                   Additional Staff: Height:            157.48 cm           Admit Date:           2024 Weight:            98.88 kg            Admission Status:     Inpatient - Routine BSA:               1.98 m2             Department Location: Blood Pressure: 59 /49 mmHg Study Type:    TRANSTHORACIC ECHO (TTE) LIMITED Diagnosis/ICD: Acute on chronic diastolic (congestive) heart failure                (CHF)-I50.33 Indication:    Acute on chronic diastolic congestive heart failure CPT Codes:     Echo Limited-27658; Color Doppler-09274; Doppler Limited-95312 Patient History: Pertinent History: PAF  Peripheralvascular disease low blood pressure HTN Breast                    Cancer Mixed Hyperlipidemia HF CVA Hypotension DMII CAD CKD. Study Detail: The following Echo studies were performed: 2D, M-Mode, Doppler and               color flow. Technically challenging study due to body habitus,               patient lying in supine position, poor acoustic windows, prominent               lung artifact and Breast Prosthesis. Definity used as a contrast               agent for endocardial border definition. Unable to obtain               suprasternal notch view.  PHYSICIAN INTERPRETATION: Left Ventricle: Left ventricular systolic function is normal, with an estimated ejection fraction of 60-65%. There are no regional wall motion abnormalities. The left ventricular cavity size is normal. Left ventricular diastolic filling was indeterminate. Left Atrium: The left atrium is moderately dilated. Right Ventricle: The right ventricle is normal in size. There is normal right ventricular global systolic function. Right Atrium: The right atrium is normal in size. Aortic Valve: The aortic valve is trileaflet. There is no evidence of aortic valve regurgitation. The peak instantaneous gradient of the aortic valve is 5.3 mmHg. Mitral Valve: The mitral valve is normal in structure. There is severe mitral annular calcification. There is trace mitral valve regurgitation. Tricuspid Valve: The tricuspid valve is structurally normal. There is mild tricuspid regurgitation. Pulmonic Valve: The pulmonic valve is not well visualized. The pulmonic valve regurgitation was not well visualized. Pericardium: There is no pericardial effusion noted. Aorta: The aortic root is normal.  CONCLUSIONS:  1. Left ventricular systolic function is normal with a 60-65% estimated ejection fraction.  2. The left atrium is moderately dilated.  3. There is severe mitral annular calcification. QUANTITATIVE DATA SUMMARY: 2D MEASUREMENTS:                           Normal Ranges: LAs:           3.80 cm   (2.7-4.0cm) IVSd:          0.92 cm   (0.6-1.1cm) LVPWd:         1.09 cm   (0.6-1.1cm) LVIDd:         2.98 cm   (3.9-5.9cm) LVIDs:         2.12 cm LV Mass Index: 41.2 g/m2 LV % FS        28.9 % LA VOLUME:                               Normal Ranges: LA Vol A4C:        58.3 ml    (22+/-6mL/m2) LA Vol A2C:        60.9 ml LA Vol BP:         62.1 ml LA Vol Index A4C:  29.4ml/m2 LA Vol Index A2C:  30.7 ml/m2 LA Vol Index BP:   31.3 ml/m2 LA Area A4C:       20.5 cm2 LA Area A2C:       20.1 cm2 LA Major Axis A4C: 6.1 cm LA Major Axis A2C: 5.6 cm LA Volume Index:   29.3 ml/m2 LA Vol A4C:        53.1 ml LA Vol A2C:        58.4 ml LV SYSTOLIC FUNCTION BY 2D PLANIMETRY (MOD):                     Normal Ranges: EF-A4C View: 68.2 % (>=55%) EF-A2C View: 65.9 % EF-Biplane:  67.8 % AORTIC VALVE:                         Normal Ranges: AoV Vmax:      1.15 m/s (<=1.7m/s) AoV Peak P.3 mmHg (<20mmHg) LVOT Max Shimon:  1.11 m/s (<=1.1m/s) LVOT VTI:      16.20 cm LVOT Diameter: 1.90 cm  (1.8-2.4cm) AoV Area,Vmax: 2.74 cm2 (2.5-4.5cm2) PULMONIC VALVE:                         Normal Ranges: PV Accel Time: 63 msec  (>120ms) PV Max Shimon:    1.0 m/s  (0.6-0.9m/s) PV Max P.3 mmHg  [id number] Herminio Lemus DO Electronically signed on 2024 at 7:58:25 AM  ** Final **     Upper extremity venous duplex bilateral    Result Date: 2024           [address] [address]            Phone [phone]  Vascular Lab Report  VASC US UPPER EXTREMITY VENOUS DUPLEX BILATERAL Patient Name:      BASIA Gray Physician:  [id number] Mini Busby MD, RPVI Study Date:        2024           Ordering Provider:  [id number] ERI BERMAN MRN/PID:           [id number]            Fellow: Accession#:        [id number]        Technologist:       Dara Parr RVT Date of Birth/Age: 1950 / 73 years  Technologist 2: Gender:            F                   Encounter#:         [id number] Admission Status:  Inpatient           Location Performed: [facility]  Diagnosis/ICD: Right arm swelling-M79.89; Left arm swelling-M79.89 CPT Codes:     48684 Peripheral venous duplex scan for DVT complete  CONCLUSIONS:  Right Upper Venous: No evidence of acute deep vein thrombus visualized in the right upper extremity. Internal jugular vein was visualized in segments due to IV lines and bandages. Left Upper Venous: No evidence of acute deep vein thrombus visualized in the left upper extremity. Subclavian stent is noted and appears patent. There is a known occluded dialysis access noted.  Additional Findings: Technically difficult exam due to IV lines, bandages, and patient's positioning.  Imaging & Doppler Findings:  Right               Compressible Thrombus        Flow Internal Jugular        Yes        None   Spontaneous/Phasic Subclavian              Yes        None Subclavian Proximal     Yes        None   Spontaneous/Phasic Subclavian Mid          Yes        None Subclavian Distal       Yes        None   Spontaneous/Phasic Axillary                Yes        None       Pulsatile Brachial                Yes        None Cephalic                Yes        None Basilic                 Yes        None  Left                Compress Thrombus        Flow Internal Jugular      Yes      None       Pulsatile Subclavian            Yes      None Subclavian Proximal   Yes      None       Pulsatile Subclavian Mid        Yes      None       Pulsatile Subclavian Distal     Yes      None       Pulsatile Axillary              Yes      None   Spontaneous/Phasic Brachial              Yes      None Cephalic              Yes      None Basilic               Yes      None  [id number] Mini Busby MD, RPMOOKIE Electronically signed by [id number] Mini Busby MD, ALICE on 1/25/2024 at 4:06:13 PM  ** Final **     XR tibia fibula right 2 views    Result  Date: 1/22/2024  Interpreted By:  Real Mendieta, STUDY: XR TIBIA FIBULA RIGHT 2 VIEWS; 1/22/2024 2:15 pm   INDICATION: Signs/Symptoms:evaluate source of infection, osteomyelitis;   COMPARISON: None available.   ACCESSION NUMBER(S): [id number]   ORDERING CLINICIAN: BAN GIFFORD   TECHNIQUE: Views: AP and Lateral of the right tibia and fibula   FINDINGS: RESULT: There is no evidence for fracture or dislocation. Tricompartmental degenerative changes of the right knee. The tibia and fibula appear intact without bony erosion or evidence for osteomyelitis. No other bony or soft tissue abnormality is identified. No subcutaneous gas is noted.       No evidence for acute osseous abnormality. No bony erosion to suggest osteomyelitis.   Signed by: Real Mendieta 1/22/2024 3:15 PM Dictation workstation:   [id number]    XR ankle right 3+ views    Result Date: 1/22/2024  Interpreted By:  Real Mendieta, STUDY: XR ANKLE RIGHT 3+ VIEWS; 1/22/2024 2:15 pm   INDICATION: Signs/Symptoms:Evuluate source of infection, osteomyelitis;   COMPARISON: None available.   ACCESSION NUMBER(S): [id number]   ORDERING CLINICIAN: BAN GIFFORD   TECHNIQUE: Views: AP, Lateral, Oblique, right ankle   FINDINGS: RESULT: There is no evidence for fracture or dislocation. The ankle mortise is intact. Joint spaces appear adequately maintained. No bony erosion to suggest osteomyelitis. No bone lesion or soft tissue abnormality is identified. No subcutaneous gas is seen.       No evidence for acute osseous abnormality. No bony erosion to suggest osteomyelitis.   Signed by: Real Mendieta 1/22/2024 3:14 PM Dictation workstation:   [id number]    Transthoracic Echo (TTE) Complete    Result Date: 1/22/2024           [place] [address]            Phone [phone] TRANSTHORACIC ECHOCARDIOGRAM REPORT  Patient Name:      BASIA Gray Physician:   [id number] Fausto Rowe DO Study Date:         1/22/2024           Ordering Provider:   [id number] ERI BERMAN MRN/PID:           [id number]            Fellow: Accession#:        [id number]        Nurse: Date of Birth/Age: 1950 / 73 years Sonographer:         Tabatha Page RDCS Gender:            F                   Additional Staff: Height:            157.00 cm           Admit Date: Weight:            75.00 kg            Admission Status:    Inpatient - Routine BSA:               1.76 m2             Department Location: [facility] Blood Pressure: 88 /49 mmHg Study Type:    TRANSTHORACIC ECHO (TTE) COMPLETE Diagnosis/ICD: Chronic combined systolic (congestive) and diastolic (congestive)                heart failure (CHF)-I50.42; Sepsis, unspecified organism-A41.9 Indication:    heart failure,septic shock CPT Codes:     Echo Complete w Full Doppler-95011 Patient History: BMI:               Obese >30 Pertinent History: Sepsis, PVD, A-Fib, CVA, Cancer and HTN. breast                    prosthesis/ca, ESRD,anemia,septic shock,heart failure. Study Detail: The following Echo studies were performed: 2D, M-Mode, Doppler and               color flow. Technically challenging study due to prosthesis,               prominent lung artifact and body habitus.  PHYSICIAN INTERPRETATION: Left Ventricle: Left ventricular systolic function is normal, with an estimated ejection fraction of 70%. There are no regional wall motion abnormalities. The left ventricular cavity size is normal. Left ventricular diastolic filling was indeterminate. Left Atrium: The left atrium is moderately dilated. Right Ventricle: The right ventricle is normal in size. There is normal right ventricular global systolic function. Right Atrium: The right atrium is normal in size. Aortic Valve: The aortic valve is trileaflet. There is no evidence of aortic valve regurgitation. The peak instantaneous gradient of the aortic valve is 2.8 mmHg.  Mitral Valve: The mitral valve is normal in structure. There is no evidence of mitral valve regurgitation. Tricuspid Valve: The tricuspid valve is structurally normal. There is trace tricuspid regurgitation. Pulmonic Valve: The pulmonic valve is not well visualized. The pulmonic valve regurgitation was not well visualized. Pericardium: There is no pericardial effusion noted. Aorta: The aortic root is normal.  CONCLUSIONS:  1. Left ventricular systolic function is normal with a 70% estimated ejection fraction.  2. The left atrium is moderately dilated.  3. Left ventricular diastolic filling indeterminate. QUANTITATIVE DATA SUMMARY: 2D MEASUREMENTS:                          Normal Ranges: LAs:           4.50 cm   (2.7-4.0cm) IVSd:          0.61 cm   (0.6-1.1cm) LVPWd:         0.88 cm   (0.6-1.1cm) LVIDd:         3.66 cm   (3.9-5.9cm) LV Mass Index: 41.9 g/m2 LV SYSTOLIC FUNCTION BY 2D PLANIMETRY (MOD):                     Normal Ranges: EF-A4C View: 68.3 % (>=55%) LV DIASTOLIC FUNCTION:                     Normal Ranges: MV Peak E: 1.19 m/s (0.7-1.2 m/s) MV Peak A: 0.90 m/s (0.42-0.7 m/s) E/A Ratio: 1.32     (1.0-2.2) MITRAL VALVE:                 Normal Ranges: MV DT: 192 msec (150-240msec) AORTIC VALVE:                         Normal Ranges: AoV Vmax:      0.84 m/s (<=1.7m/s) AoV Peak P.8 mmHg (<20mmHg) LVOT Max Shimon:  0.47 m/s (<=1.1m/s) LVOT Diameter: 1.90 cm  (1.8-2.4cm) AoV Area,Vmax: 1.57 cm2 (2.5-4.5cm2) TRICUSPID VALVE/RVSP:                   Normal Ranges: IVC Diam: 1.05 cm  [id number] Faust[address] Rowe  Electronically signed on 2024 at 2:53:04 PM  ** Final **     XR foot left 3+ views    Result Date: 2024  Interpreted By:  Real Mendieta, STUDY: XR FOOT LEFT 1-2 VIEWS; 2024 4:15 pm   INDICATION: Signs/Symptoms:osteomyelitis. Pain   COMPARISON: 2023   ACCESSION NUMBER(S): [id number]   ORDERING CLINICIAN: BAN GIFFORD   TECHNIQUE: Views:  AP, Lat, Oblique, left foot   FINDINGS: RESULT:  There is no evidence for fracture or dislocation. 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 Mild hammertoe deformities. Joint spaces appear adequately maintained. Soft tissue ulceration of the posterior aspect of the heel however no evidence for bony erosion to suggest osteomyelitis.       No evidence for acute fracture or acute bony erosion to suggest osteomyelitis. Chronic changes as described.   Signed by: Real Mendieta 1/21/2024 7:15 PM Dictation workstation:   [id number]    CT chest abdomen pelvis wo IV contrast    Result Date: 1/21/2024  Interpreted By:  Maria Garcia, STUDY: CT CHEST ABDOMEN PELVIS WO CONTRAST;  1/20/2024 11:42 pm   INDICATION: Mental status change. Elevated white blood cell count with infectious workup.   COMPARISON: 08/20/2023   ACCESSION NUMBER(S): [id number]   ORDERING CLINICIAN: BAN GIFFORD   TECHNIQUE: CT of the chest, abdomen and pelvis was performed with no oral or intravenous contrast administered. Sagittal and coronal reformations were completed by the technologist at the acquisition scanner.   All CT examinations are performed with 1 or more of the following dose reduction techniques: Automated exposure control, adjustment of mA and/or kv according to patient's size, or use of iterative reconstruction techniques.   FINDINGS: Please note that the study is limited without intravenous contrast.   CHEST: Bilateral pleural effusions are present with right effusion moderately small in size and with the left effusion moderately small in size as well. There is shift of the heart and mediastinum to the left of midline due to atelectasis of the left upper lobe. There is consolidation throughout left lower lobe with air bronchograms noted. On the right, there is compressive atelectasis seen posteriorly in the right lower lobe.   There is mild reactive mediastinal adenopathy seen at this time with mediastinal lymph nodes increased in size since prior study.  Several of these mediastinal nodes are at the upper limits of normal measuring 8 mm in short axis diameter.   No pericardial effusion is present. The cardiac size is normal with mitral annulus calcification.   There has been prior bilateral mastectomy with reconstruction of the left breast with implant placement. Surgical clips are visible within the left axilla. Stent grafts are visible within the left upper extremity and within the left innominate, subclavian as well as axillary veins.   A peripherally calcified 1.8 cm nodule arises from the lower pole of left thyroid lobe.   ABDOMEN:   LIVER: Unremarkable.   BILE DUCTS: No intrahepatic or extrahepatic biliary ductal dilatation is seen.   GALLBLADDER: Cholelithiasis is observed with some calcification of the gallbladder wall demonstrated as well. No gallbladder wall thickening or pericholecystic fluid is evident.   PANCREAS: Unremarkable   SPLEEN: Unremarkable   ADRENAL GLANDS: Unremarkable   KIDNEYS AND URETERS: Kidneys are small in size with extensive renal artery calcification demonstrated.   PELVIS:   BLADDER: Amos catheter is present within the decompressed urinary bladder.   REPRODUCTIVE ORGANS: Calcification of the arcuate arteries within the uterus is seen. There is no uterine enlargement or adnexal mass.   BOWEL: A rectal balloon is seen. There is no abnormal distention of the intestinal tract.   VESSELS: There is atherosclerosis of the thoracoabdominal aorta and iliac arteries with calcification in the walls of the celiac, splenic, hepatic, and mesenteric arteries.   PERITONEUM/RETROPERITONEUM/LYMPH NODES: There is a minimal amount of ascites seen about the liver and spleen with mild presacral edema noted.   ABDOMINAL WALL: There is anasarca involving the soft tissues of the abdomen and pelvis. Postoperative change from ventral hernia repair is seen.   BONES: Bilateral sacroiliitis is seen. There is lumbar dextroscoliosis. Chronic rotator cuff tear is  present bilaterally with osteoarthritis of both shoulders, right greater than left.       Chest 1.  Moderately small bilateral pleural effusions with left upper lobe atelectasis and compressive atelectasis seen posteriorly in right lower lobe. A left lower lobe pneumonia is identified. There is extensive airspace consolidation within left lower lobe with air bronchograms observed. Mild mediastinal reactive adenopathy is present as well.   Abdomen-Pelvis 1.  Cholelithiasis without evidence of cholecystitis. 2. Small amount of ascites with mild presacral edema and anasarca within the soft tissues of the abdominal wall. 3. Renal atrophy with extensive vascular calcifications.     MACRO: None   Signed by: Maria Garcia 1/21/2024 8:30 AM Dictation workstation:   [id number]    CT head wo IV contrast    Result Date: 1/21/2024  Interpreted By:  Maria Garcia, STUDY: CT HEAD WO IV CONTRAST 1/20/2024 11:42 pm   INDICATION: Signs/Symptoms:mental status changes   COMPARISON: 12/11/2023   ACCESSION NUMBER(S): [id number]   ORDERING CLINICIAN: BAN GIFFORD   TECHNIQUE: Unenhanced axial images of the brain are completed.   All CT examinations are performed with 1 or more of the following dose reduction techniques: Automated exposure control, adjustment of mA and/or kv according to patient's size, or use of iterative reconstruction techniques.   FINDINGS: Helical unenhanced axial images of the brain demonstrate a mild to moderate degree of ventricular enlargement with proportionate widening of the sulci and sylvian fissures. There is no midline shift, mass effect, extra-axial fluid collection, or acute intracranial hemorrhage. There is diminished density seen in the periventricular white matter indicating chronic microvascular ischemic disease. There is calcified plaque seen within the distal vertebral and internal carotid arteries bilaterally. No calvarial abnormality is seen.       Atrophy and chronic microvascular ischemic disease  without acute intracranial process.   Signed by: Maria Garcia 1/21/2024 8:09 AM Dictation workstation:   [id number]     Assessment/Plan   Septic shock-resolved  Left-sided pleural effusion   Left lower lobe pneumonia-treated  Proteus urinary tract infection-treated  History of MRSA endocarditis  History of C. difficile infection  Infected bilateral heel ulcers-wound culture growing Pseudomonas, Proteus, MRSA  Eajqllbpdelo-xnyrcfxxiilkkk-ibwgnxyi  Type 2 diabetes with peripheral neuropathy with gangrene-Matson 3 versus 4  Severe malnutrition-prealbumin less than 3           Monitor off pressors  IV cefepime-coverage for Pseudomonas and Proteus  IV vancomycin-coverage for MRSA  Continue oral doxycycline-suppressive therapy  Continue oral vancomycin-patient at risk for relapse  Contact plus precautions-at risk for relapse  Supportive care  Monitor temperature and WBC  Local care  Offloading  Tentative duration of antibiotics is 14 days-2/27/2024      Stephen Coulter MD

## 2024-02-19 NOTE — PROGRESS NOTES
Vancomycin Dosing by Pharmacy- FOLLOW-UP (HEMODIALYSIS)    Ayanna Gross is a 73 y.o. year old female who Pharmacy has been consulted for vancomycin dosing for Vancomycin Indications: Skin & Soft Tissue. Based on the patient's indication and renal status this patient will be dosed based on a pre-HD level of 20-25 mcg/mL.     Patient is currently on hemodialysis.    Vancomycin maintenance dose: 1000 mg after each dialysis session User Schedule (TBD) 1000mg after dialysis on Friday, 2/16     Vancomycin pre-HD level 23.3 today, 2/19    Lab Results   Component Value Date    VANCORANDOM 23.3 (H) 02/19/2024       Visit Vitals  BP (!) 60/30   Pulse 94   Temp 35.7 °C (96.3 °F) (Temporal)   Resp 18        Lab Results   Component Value Date    CREATININE 3.20 (H) 02/19/2024    CREATININE 2.70 (H) 02/18/2024    CREATININE 2.30 (H) 02/17/2024    CREATININE 2.50 (H) 02/15/2024       I/O last 3 completed shifts:  In: 220 (2.4 mL/kg) [P.O.:120; IV Piggyback:100]  Out: 350 (3.8 mL/kg) [Stool:350]  Dosing Weight: 93 kg     Assessment/Plan     Within goal random/trough level. Maintenance dose still reduced to 750mg. Vanco level trending up and was the 72 hour interval from last dose. Reduce dose to 750mg and get new pre-HD level on 2/21.  Pre-HD level will be obtained on 2/21 at 0500. May be obtained sooner if clinically indicated. If level is above goal, random level with AM labs the next morning will be obtained.   Will continue to monitor renal function daily while on vancomycin and order serum creatinine at least every 48 hours if not already ordered.  Follow for continued vancomycin needs, clinical response, and signs/symptoms of toxicity.     Kendall Alfonso, PharmD

## 2024-02-19 NOTE — PROGRESS NOTES
"Nutrition Follow up Note    Nutrition Assessment      Infected heel ulcers. HD this AM. Continued poor po intake. Pt had been ordered supplements and asked for them to be discontinued. Will continue to monitor.     Nutrition History:  Energy Intake: Poor < 50 %    Anthropometrics:  Ht: 157.5 cm (5' 2\"), Wt: 99.3 kg (218 lb 14.7 oz), BMI: 39.99  IBW/kg (Dietitian Calculated): 50 kg  Percent of IBW: 151.6 %  Adjusted Body Weight (kg): 56.36 kg    Weight Change:  Daily Weight  02/19/24 : 99.3 kg (218 lb 14.7 oz)  01/05/24 : 72.8 kg (160 lb 7.9 oz)  11/19/23 : 72.6 kg (160 lb)  01/19/23 : 80.2 kg (176 lb 12.9 oz)  11/07/22 : 86.2 kg (190 lb)  07/21/22 : 80.2 kg (176 lb 12.9 oz)  05/26/22 : 77.1 kg (170 lb)  04/04/22 : 86.2 kg (190 lb)  06/29/21 : 87.9 kg (193 lb 12.6 oz)  05/11/21 : 81.6 kg (180 lb)     Weight History / % Weight Change: wt gain noted in chart review likely r/t fluid    Nutrition Focused Physical Exam Findings:   Edema  Edema: +3 moderate, +2 mild  Edema Location: BLE, BUE    Physical Findings (Nutrition Deficiency/Toxicity)  Skin: Positive (R tibia skin tear, L toe pressure injury, bilateral buttock pressure injuries, L ankle DM ulcer, bilateral heel DM ulcers)    Nutrition Significant Labs:  Lab Results   Component Value Date    WBC 12.8 (H) 02/19/2024    HGB 8.0 (L) 02/19/2024    HCT 25.6 (L) 02/19/2024     02/19/2024    CHOL 104 (L) 07/01/2023    TRIG 155 (H) 07/01/2023    HDL 30 (L) 07/01/2023    ALT 6 02/13/2024    AST 14 02/13/2024     (L) 02/19/2024    K 3.4 02/19/2024    CL 99 02/19/2024    CREATININE 3.20 (H) 02/19/2024    BUN 36 (H) 02/19/2024    CO2 18 (L) 02/19/2024    TSH 6.10 (H) 01/24/2024    INR 1.3 (H) 01/19/2024    HGBA1C 8.8 (H) 06/25/2023    ALBUR 1,315 (H) 02/24/2019       Current Facility-Administered Medications:     acetaminophen (Tylenol) tablet 650 mg, 650 mg, oral, q4h PRN, MADHURI Mccray-CNP, 650 mg at 02/12/24 2056    albumin human 25 % solution 12.5 g, " 12.5 g, intravenous, q1h PRN, Saeed Mondragon MD, Stopped at 02/14/24 1817    alteplase (Cathflo Activase) injection 2 mg, 2 mg, intra-catheter, PRN, LILLIE Mccray    ammonium lactate (Lac-Hydrin) 12 % lotion 1 Application, 1 Application, Topical, q1h PRN, Minh Shane MD, 1 Application at 02/15/24 0945    apixaban (Eliquis) tablet 2.5 mg, 2.5 mg, oral, BID, Judd Tavera DO, 2.5 mg at 02/18/24 2113    benzocaine-menthoL (Dermoplast) topical spray, , Topical, 4x daily PRN, LILLIE Diaz    cefepime (Maxipime) 1 g in dextrose 5 % 50 mL IV, 1 g, intravenous, q24h, Stephen Coulter MD, Stopped at 02/18/24 2143    dextrose 10 % in water (D10W) infusion, 0.3 g/kg/hr, intravenous, Once PRN, Judd Tavera DO    dextrose 50 % injection 25 g, 25 g, intravenous, q15 min PRN, Judd Tavera DO    doxycycline (Vibramycin) capsule 100 mg, 100 mg, oral, Daily, Judd Barreto MD, 100 mg at 02/18/24 1209    epoetin di-epbx (Retacrit) injection 10,000 Units, 10,000 Units, intravenous, Every Mon/Wed/Fri, Saeed Mondragon MD, 10,000 Units at 02/16/24 1218    escitalopram (Lexapro) tablet 10 mg, 10 mg, oral, Nightly, Judd Tavera DO, 10 mg at 02/18/24 2113    fludrocortisone (Florinef) tablet 0.1 mg, 0.1 mg, oral, TID, Yusuf De La Garza PA-C, 0.1 mg at 02/18/24 2113    gabapentin (Neurontin) capsule 100 mg, 100 mg, oral, TID, LILLIE Diaz, 100 mg at 02/18/24 2113    glucagon (Glucagen) injection 1 mg, 1 mg, intramuscular, q15 min PRN, Judd Tavera DO    heparin 1,000 unit/mL injection 2,000 Units, 2,000 Units, intra-catheter, After Dialysis, Saeed Mondragon MD, 2,000 Units at 02/16/24 1245    heparin 1,000 unit/mL injection 2,000 Units, 2,000 Units, intra-catheter, After Dialysis, Saeed Mondragon MD, 2,000 Units at 02/16/24 1753    heparin 1,000 unit/mL injection 2,000 Units, 2,000 Units, intra-catheter, After Dialysis, Saeed CASTRO  MD Giancarlo, 2,000 Units at 02/14/24 1617    heparin 1,000 unit/mL injection 2,000 Units, 2,000 Units, intra-catheter, After Dialysis, Saeed Mondragon MD, 2,000 Units at 02/14/24 1616    insulin lispro (HumaLOG) injection 0-10 Units, 0-10 Units, subcutaneous, TID with meals, Judd Tavera DO, 4 Units at 02/18/24 1648    ipratropium-albuteroL (Duo-Neb) 0.5-2.5 mg/3 mL nebulizer solution 3 mL, 3 mL, nebulization, q6h while awake, Judd Barreto MD, 3 mL at 02/19/24 0712    lidocaine (Xylocaine) 10 mg/mL (1 %) injection 50 mg, 5 mL, infiltration, Once, LILLIE Mccray    midodrine (Proamatine) tablet 15 mg, 15 mg, oral, TID with meals, Judd Tavera DO, 15 mg at 02/19/24 0812    norepinephrine (Levophed) 8 mg in dextrose 5% 250 mL (0.032 mg/mL) infusion (premix), 0.01-3 mcg/kg/min, intravenous, Continuous, Yusuf De La Garza PA-C, Stopped at 02/12/24 1700    nystatin (Mycostatin) 100,000 unit/gram powder, , Topical, BID, Judd Tavera DO, Given at 02/18/24 2145    nystatin (Mycostatin) cream, , Topical, BID, LILLIE Blair, Given at 02/18/24 2144    ondansetron (Zofran) injection 4 mg, 4 mg, intravenous, q6h PRN, LILLIE Mccray, 4 mg at 02/03/24 0521    oxygen (O2) therapy, , inhalation, Continuous PRN - O2/gases, Judd aTvera DO, 2 L/min at 02/14/24 0744    pantoprazole (ProtoNix) EC tablet 40 mg, 40 mg, oral, Daily, 40 mg at 02/18/24 0908 **OR** pantoprazole (ProtoNix) injection 40 mg, 40 mg, intravenous, Daily, Yusuf De La Garza, PA-C, 40 mg at 02/07/24 0859    perflutren lipid microspheres (Definity) injection 0.5-10 mL of dilution, 0.5-10 mL of dilution, intravenous, Once in imaging, Minh Shane MD    perflutren protein A microsphere (Optison) injection 0.5 mL, 0.5 mL, intravenous, Once in imaging, Minh Shane MD    psyllium (Metamucil) 3.4 gram packet 1 packet, 1 packet, oral, BID, Ronald Casey, APRN-CNP, 1 packet at 02/15/24 4024     sennosides-docusate sodium (Judy-Colace) 8.6-50 mg per tablet 1 tablet, 1 tablet, oral, Nightly PRN, Yusuf De La Garza PA-C    sulfur hexafluoride microsphr (Lumason) injection 24.28 mg, 2 mL, intravenous, Once in imaging, Minh Shane MD    sulfur hexafluoride microsphr (Lumason) injection 24.28 mg, 2 mL, intravenous, Once in imaging, Ángel Hardy, APRN-CNP    vancomycin (Vancocin) capsule 125 mg, 125 mg, oral, BID, Iraida Lehman APRN-CNP, 125 mg at 02/18/24 2113    vancomycin (Vancocin) placeholder, , miscellaneous, Daily PRN, Kendall Alfonso, PharmD    zinc oxide 20 % ointment 1 Application, 1 Application, Topical, BID, Nickolas Gallego, APRN-CNP, 1 Application at 02/18/24 2144    Dietary Orders (From admission, onward)       Start     Ordered    02/07/24 1054  Adult diet Regular  Diet effective now        Question:  Diet type  Answer:  Regular    02/07/24 1053                  Estimated Needs:   Estimated Energy Needs  Total Energy Estimated Needs (kCal):  (7068-7891)  Total Estimated Energy Need per Day (kCal/kg):  (30-35)  Method for Estimating Needs: ABW    Estimated Protein Needs  Total Protein Estimated Needs (g):  (68-85)  Total Protein Estimated Needs (g/kg):  (1.2-1.5)  Method for Estimating Needs: ABW    Estimated Fluid Needs  Total Fluid Estimated Needs (mL):  (UO + 500 mL)  Method for Estimating Needs: HD and multiple wounds        Nutrition Diagnosis   Nutrition Diagnosis:  Nutrition Diagnosis  Patient has Nutrition Diagnosis: Yes  Diagnosis Status (1): Ongoing  Nutrition Diagnosis 1: Increased nutrient needs  Related to (1): Increased demand for nutrient  As Evidenced by (1): HD       Nutrition Interventions/Recommendations   Nutrition Interventions and Recommendations:    Nutrition Prescription:  Individualized Nutrition Prescription Provided for : 6700-6814 kcals, 68-85 gm protein to be provided via diet    Nutrition Interventions:   Food and/or Nutrient Delivery  Interventions  Interventions: Meals and snacks  Meals and Snacks: General healthful diet  Goal: provide as ordered  Medical Food Supplement: Other (Comment)  Goal: pt requested supplements be dicontinued on 1/31/24    Education Documentation  No documentation found.           Nutrition Monitoring and Evaluation   Monitoring/Evaluation:   Food/Nutrient Related History Monitoring  Monitoring and Evaluation Plan: Energy intake  Energy Intake: Estimated energy intake  Criteria: Pt to consume >/= 75% of estimated needs       Time Spent/Follow-up:   Follow Up  Time Spent (min): 15 minutes  Last Date of Nutrition Visit: 02/19/24  Nutrition Follow-Up Needed?: 5-7 days  Follow up Comment: 2/23/24

## 2024-02-19 NOTE — PROGRESS NOTES
Ayanna Gross is a 73 y.o. female on day 31 of admission presenting with Septic shock (CMS/HCC).      Subjective   Seen during dialysis and was tolerating treatment.  SBP in the 80s.  No dyspnea at rest.       Scheduled medications  apixaban, 2.5 mg, oral, BID  cefepime, 1 g, intravenous, q24h  doxycylcine, 100 mg, oral, Daily  epoetin di or biosimilar, 10,000 Units, intravenous, Every Mon/Wed/Fri  escitalopram, 10 mg, oral, Nightly  fludrocortisone, 0.1 mg, oral, TID  gabapentin, 100 mg, oral, TID  heparin, 2,000 Units, intra-catheter, After Dialysis  heparin, 2,000 Units, intra-catheter, After Dialysis  heparin, 2,000 Units, intra-catheter, After Dialysis  heparin, 2,000 Units, intra-catheter, After Dialysis  insulin lispro, 0-10 Units, subcutaneous, TID with meals  ipratropium-albuteroL, 3 mL, nebulization, q6h while awake  lidocaine, 5 mL, infiltration, Once  midodrine, 15 mg, oral, TID with meals  nystatin, , Topical, BID  nystatin, , Topical, BID  pantoprazole, 40 mg, oral, Daily   Or  pantoprazole, 40 mg, intravenous, Daily  perflutren lipid microspheres, 0.5-10 mL of dilution, intravenous, Once in imaging  perflutren protein A microsphere, 0.5 mL, intravenous, Once in imaging  psyllium, 1 packet, oral, BID  sulfur hexafluoride microsphr, 2 mL, intravenous, Once in imaging  sulfur hexafluoride microsphr, 2 mL, intravenous, Once in imaging  vancomycin, 125 mg, oral, BID  zinc oxide, 1 Application, Topical, BID      Continuous medications  norepinephrine, 0.01-3 mcg/kg/min, Last Rate: Stopped (02/12/24 1700)      PRN medications  PRN medications: acetaminophen, albumin human, alteplase, ammonium lactate, benzocaine-menthoL, dextrose 10 % in water (D10W), dextrose, glucagon, ondansetron, oxygen, sennosides-docusate sodium, vancomycin      Objective      Vitals 24HR  Heart Rate:  [85-97]   Temp:  [35.7 °C (96.3 °F)-36.6 °C (97.9 °F)]   Resp:  [14-25]   BP: (47-65)/(30-51)   Weight:  [91.4 kg (201 lb 8 oz)-99.3  kg (218 lb 14.7 oz)]   SpO2:  [89 %-100 %]     General: Elderly woman, not in distress  Access: Right IJ TDC with good flows  Eyes: Pale, anicteric  Lungs: Clear anteriorly  Heart: S1 and S2, regular  Abdomen: Soft, nontender  Extremities: Dressing over both feet, no appreciable proximal edema  Neuro: Awake and interactive    Intake/Output last 3 Shifts:    Intake/Output Summary (Last 24 hours) at 2/19/2024 0904  Last data filed at 2/19/2024 0000  Gross per 24 hour   Intake 170 ml   Output 50 ml   Net 120 ml       Relevant Results    Results for orders placed or performed during the hospital encounter of 01/19/24 (from the past 24 hour(s))   POCT GLUCOSE   Result Value Ref Range    POCT Glucose 153 (H) 74 - 99 mg/dL   POCT GLUCOSE   Result Value Ref Range    POCT Glucose 248 (H) 74 - 99 mg/dL   POCT GLUCOSE   Result Value Ref Range    POCT Glucose 139 (H) 74 - 99 mg/dL   Vancomycin   Result Value Ref Range    Vancomycin 23.3 (H) 10.0 - 20.0 ug/mL   CBC   Result Value Ref Range    WBC 12.8 (H) 4.4 - 11.3 x10*3/uL    nRBC 0.0 0.0 - 0.0 /100 WBCs    RBC 2.56 (L) 4.00 - 5.20 x10*6/uL    Hemoglobin 8.0 (L) 12.0 - 16.0 g/dL    Hematocrit 25.6 (L) 36.0 - 46.0 %     80 - 100 fL    MCH 31.3 26.0 - 34.0 pg    MCHC 31.3 (L) 32.0 - 36.0 g/dL    RDW 20.9 (H) 11.5 - 14.5 %    Platelets 218 150 - 450 x10*3/uL   Basic metabolic panel   Result Value Ref Range    Glucose 184 (H) 65 - 99 mg/dL    Sodium 132 (L) 133 - 145 mmol/L    Potassium 3.4 3.4 - 5.1 mmol/L    Chloride 99 97 - 107 mmol/L    Bicarbonate 18 (L) 24 - 31 mmol/L    Urea Nitrogen 36 (H) 8 - 25 mg/dL    Creatinine 3.20 (H) 0.40 - 1.60 mg/dL    eGFR 15 (L) >60 mL/min/1.73m*2    Calcium 8.9 8.5 - 10.4 mg/dL    Anion Gap 15 <=19 mmol/L   Magnesium   Result Value Ref Range    Magnesium 1.40 (L) 1.60 - 3.10 mg/dL   POCT GLUCOSE   Result Value Ref Range    POCT Glucose 156 (H) 74 - 99 mg/dL       Assessment/Plan      ESRD on HD  Chronic hypertension, on  midodrine  Hypokalemia  Anemia in CKD, on BIB    Evaluated during dialysis (TABLO) and orders were reviewed with the ICU nurse.  SBP in the 80s but asymptomatic.  UF goal 1.5 L today.  Next dialysis on Wednesday.    Continue other care.      Raj Mcgee MD

## 2024-02-19 NOTE — PROGRESS NOTES
Dc plan anticipated that pt will need SNF. Will need therapy orders when pt is appropriate. Pt will require a precert.     Safe dc plan not secured-need PT/OT, facility that can accept, and precert    Hanane ROSALESA, LSW

## 2024-02-20 NOTE — PROGRESS NOTES
Ayanna Gross is a 73 y.o. female on day 32 of admission presenting with Septic shock (CMS/[facility]).    Critical Care Medicine Progress Note    Admitted on:     1/19/2024  Length of Stay: 32 day(s)     Interval History    Levophed started last night   Confused this am   Still has diarrhea     Objective   Objective     Vitals:    02/20/24 0814   Weight: 102 kg (225 lb 8.5 oz)   Body mass index is 41.25 kg/m².        2/20/2024    11:45 AM 2/20/2024    12:00 PM 2/20/2024    12:15 PM 2/20/2024    12:30 PM 2/20/2024    12:45 PM 2/20/2024     1:00 PM 2/20/2024     1:15 PM   Vitals   Systolic 75 78 77 68 64 65 65   Diastolic 41 62 35 36 38 33 41   Heart Rate 96 97 98 99 98 99 97   Resp 19 19 16 16 15 15 16        Vent settings:       Intake/Output Summary (Last 24 hours) at 2/20/2024 1336  Last data filed at 2/20/2024 1300  Gross per 24 hour   Intake 496.79 ml   Output 280 ml   Net 216.79 ml     Physical exam:  -----------------  Awake and oriented to place and person  Cardiovascular:      Rate and Rhythm: Normal rate. Rhythm irregular.   Pulmonary:      Effort: Pulmonary effort is normal.   Abdominal:      General: Abdomen is flat.   Musculoskeletal:         General: Deformity present. Normal range of motion.      Cervical back: Normal range of motion.      Comments: Abscence of left hand first finger   Skin:     General: Skin is warm.      Capillary Refill: Capillary refill takes less than 2 seconds.      Comments: Skin tear healing to L heal  Wound to bilateral feet and sacrum     Medications     Scheduled Medications:   apixaban, 2.5 mg, oral, BID  cefepime, 1 g, intravenous, q24h  doxycylcine, 100 mg, oral, Daily  epoetin di or biosimilar, 10,000 Units, intravenous, Every Mon/Wed/Fri  escitalopram, 10 mg, oral, Nightly  fludrocortisone, 0.1 mg, oral, TID  gabapentin, 100 mg, oral, TID  heparin, 2,000 Units, intra-catheter, After Dialysis  heparin, 2,000 Units, intra-catheter, After Dialysis  heparin, 2,000 Units,  intra-catheter, After Dialysis  heparin, 2,000 Units, intra-catheter, After Dialysis  insulin lispro, 0-10 Units, subcutaneous, TID with meals  ipratropium-albuteroL, 3 mL, nebulization, q6h while awake  lidocaine, 5 mL, infiltration, Once  midodrine, 20 mg, oral, TID with meals  nystatin, , Topical, BID  nystatin, , Topical, BID  pantoprazole, 40 mg, oral, Daily   Or  pantoprazole, 40 mg, intravenous, Daily  perflutren lipid microspheres, 0.5-10 mL of dilution, intravenous, Once in imaging  perflutren protein A microsphere, 0.5 mL, intravenous, Once in imaging  psyllium, 1 packet, oral, BID  sulfur hexafluoride microsphr, 2 mL, intravenous, Once in imaging  sulfur hexafluoride microsphr, 2 mL, intravenous, Once in imaging  vancomycin, 125 mg, oral, BID  zinc oxide, 1 Application, Topical, BID       Continuous Medications:   norepinephrine, 0.01-3 mcg/kg/min, Last Rate: 0.01 mcg/kg/min (02/20/24 1300)       PRN Medications:     Labs     Results from last 72 hours   Lab Units 02/20/24  0428 02/19/24  0501 02/18/24  0500   GLUCOSE mg/dL 179* 184* 216*   SODIUM mmol/L 132* 132* 132*   POTASSIUM mmol/L 3.7 3.4 3.2*   CHLORIDE mmol/L 99 99 98   CO2 mmol/L 20* 18* 20*   BUN mg/dL 27* 36* 29*   CREATININE mg/dL 2.60* 3.20* 2.70*   EGFR mL/min/1.73m*2 19* 15* 18*   CALCIUM mg/dL 8.2* 8.9 9.0   MAGNESIUM mg/dL 1.40* 1.40* 1.50*                     Results from last 72 hours   Lab Units 02/20/24  0428 02/19/24  0501 02/18/24  0500   WBC AUTO x10*3/uL 12.3* 12.8* 13.3*   NRBC AUTO /100 WBCs 0.0 0.0 0.0   RBC AUTO x10*6/uL 2.61* 2.56* 2.58*   HEMOGLOBIN g/dL 8.3* 8.0* 8.0*   HEMATOCRIT % 26.4* 25.6* 26.1*   MCV fL 101* 100 101*   MCH pg 31.8 31.3 31.0   MCHC g/dL 31.4* 31.3* 30.7*   RDW % 20.9* 20.9* 21.1*   PLATELETS AUTO x10*3/uL 181 218 198     Results from last 72 hours   Lab Units 02/19/24  1828   FIO2 % 2     Lab Results   Component Value Date    BLOODCULT No growth at 4 days -  FINAL REPORT 01/19/2024    BLOODCULT No  growth at 4 days -  FINAL REPORT 01/19/2024    GRAMSTAIN (4+) Abundant Polymorphonuclear leukocytes (A) 02/10/2024    GRAMSTAIN (3+) Moderate Gram negative bacilli (A) 02/10/2024     Lab Results   Component Value Date    URINECULTURE >100,000 Proteus mirabilis (A) 01/19/2024       Imaging and Diagnostic Studies     Lab/Radiology/Diagnostic Review:  Results for orders placed or performed during the hospital encounter of 01/19/24 (from the past 24 hour(s))   POCT GLUCOSE   Result Value Ref Range    POCT Glucose 128 (H) 74 - 99 mg/dL   Blood Gas Venous Full Panel   Result Value Ref Range    POCT pH, Venous 7.38 7.33 - 7.43 pH    POCT pCO2, Venous 36 (L) 41 - 51 mm Hg    POCT pO2, Venous 65 (H) 35 - 45 mm Hg    POCT SO2, Venous 94 (H) 45 - 75 %    POCT Oxy Hemoglobin, Venous 91.1 (H) 45.0 - 75.0 %    POCT Hematocrit Calculated, Venous 26.0 (L) 36.0 - 46.0 %    POCT Sodium, Venous 133 (L) 136 - 145 mmol/L    POCT Potassium, Venous 3.9 3.5 - 5.3 mmol/L    POCT Chloride, Venous 101 98 - 107 mmol/L    POCT Ionized Calicum, Venous 1.20 1.10 - 1.33 mmol/L    POCT Glucose, Venous 131 (H) 74 - 99 mg/dL    POCT Lactate, Venous 1.6 0.4 - 2.0 mmol/L    POCT Base Excess, Venous -3.4 (L) -2.0 - 3.0 mmol/L    POCT HCO3 Calculated, Venous 21.3 (L) 22.0 - 26.0 mmol/L    POCT Hemoglobin, Venous 8.5 (L) 12.0 - 16.0 g/dL    POCT Anion Gap, Venous 15.0 10.0 - 25.0 mmol/L    Patient Temperature 37.0 degrees Celsius    FiO2 2 %   POCT GLUCOSE   Result Value Ref Range    POCT Glucose 184 (H) 74 - 99 mg/dL   CBC   Result Value Ref Range    WBC 12.3 (H) 4.4 - 11.3 x10*3/uL    nRBC 0.0 0.0 - 0.0 /100 WBCs    RBC 2.61 (L) 4.00 - 5.20 x10*6/uL    Hemoglobin 8.3 (L) 12.0 - 16.0 g/dL    Hematocrit 26.4 (L) 36.0 - 46.0 %     (H) 80 - 100 fL    MCH 31.8 26.0 - 34.0 pg    MCHC 31.4 (L) 32.0 - 36.0 g/dL    RDW 20.9 (H) 11.5 - 14.5 %    Platelets 181 150 - 450 x10*3/uL   Basic metabolic panel   Result Value Ref Range    Glucose 179 (H) 65 - 99  mg/dL    Sodium 132 (L) 133 - 145 mmol/L    Potassium 3.7 3.4 - 5.1 mmol/L    Chloride 99 97 - 107 mmol/L    Bicarbonate 20 (L) 24 - 31 mmol/L    Urea Nitrogen 27 (H) 8 - 25 mg/dL    Creatinine 2.60 (H) 0.40 - 1.60 mg/dL    eGFR 19 (L) >60 mL/min/1.73m*2    Calcium 8.2 (L) 8.5 - 10.4 mg/dL    Anion Gap 13 <=19 mmol/L   Magnesium   Result Value Ref Range    Magnesium 1.40 (L) 1.60 - 3.10 mg/dL   POCT GLUCOSE   Result Value Ref Range    POCT Glucose 171 (H) 74 - 99 mg/dL   POCT GLUCOSE   Result Value Ref Range    POCT Glucose 184 (H) 74 - 99 mg/dL     Upper extremity venous duplex bilateral    Result Date: 1/25/2024           [place] [address]            Phone [phone]  Vascular Lab Report  [facility] US UPPER EXTREMITY VENOUS DUPLEX BILATERAL Patient Name:      BASIA Gray Physician:  [id number] Mini Busby MD, RPVI Study Date:        1/25/2024           Ordering Provider:  [id number] ERI BERMAN MRN/PID:           [id number]            Fellow: Accession#:        [id number]        Technologist:       Dara Parr RVT Date of Birth/Age: 1950 / 73 years Technologist 2: Gender:            F                   Encounter#:         [id number] Admission Status:  Inpatient           Location Performed: [facility]  Diagnosis/ICD: Right arm swelling-M79.89; Left arm swelling-M79.89 CPT Codes:     14888 Peripheral venous duplex scan for DVT complete  CONCLUSIONS:  Right Upper Venous: No evidence of acute deep vein thrombus visualized in the right upper extremity. Internal jugular vein was visualized in segments due to IV lines and bandages. Left Upper Venous: No evidence of acute deep vein thrombus visualized in the left upper extremity. Subclavian stent is noted and appears patent. There is a known occluded dialysis access noted.  Additional Findings: Technically difficult exam due to IV lines,  bandages, and patient's positioning.  Imaging & Doppler Findings:  Right               Compressible Thrombus        Flow Internal Jugular        Yes        None   Spontaneous/Phasic Subclavian              Yes        None Subclavian Proximal     Yes        None   Spontaneous/Phasic Subclavian Mid          Yes        None Subclavian Distal       Yes        None   Spontaneous/Phasic Axillary                Yes        None       Pulsatile Brachial                Yes        None Cephalic                Yes        None Basilic                 Yes        None  Left                Compress Thrombus        Flow Internal Jugular      Yes      None       Pulsatile Subclavian            Yes      None Subclavian Proximal   Yes      None       Pulsatile Subclavian Mid        Yes      None       Pulsatile Subclavian Distal     Yes      None       Pulsatile Axillary              Yes      None   Spontaneous/Phasic Brachial              Yes      None Cephalic              Yes      None Basilic               Yes      None  [id number] Mini Busby MD, RPVI Electronically signed by [id number] Mini Busby MD, RPVI on 1/25/2024 at 4:06:13 PM  ** Final **     ECG 12 lead    Result Date: 1/24/2024  Sinus tachycardia  with 1st degree AV block Right bundle branch block Possible Lateral infarct , age undetermined Abnormal ECG Confirmed by Yesika Nj ([id number]) on 1/24/2024 6:54:45 AM    XR tibia fibula right 2 views    Result Date: 1/22/2024  Interpreted By:  Real Mendieta, STUDY: XR TIBIA FIBULA RIGHT 2 VIEWS; 1/22/2024 2:15 pm   INDICATION: Signs/Symptoms:evaluate source of infection, osteomyelitis;   COMPARISON: None available.   ACCESSION NUMBER(S): [id number]   ORDERING CLINICIAN: BAN GIFFORD   TECHNIQUE: Views: AP and Lateral of the right tibia and fibula   FINDINGS: RESULT: There is no evidence for fracture or dislocation. Tricompartmental degenerative changes of the right knee. The tibia and fibula appear intact without bony erosion  or evidence for osteomyelitis. No other bony or soft tissue abnormality is identified. No subcutaneous gas is noted.       No evidence for acute osseous abnormality. No bony erosion to suggest osteomyelitis.   Signed by: Real Mendieta 1/22/2024 3:15 PM Dictation workstation:   [id number]    XR ankle right 3+ views    Result Date: 1/22/2024  Interpreted By:  Real Mendieta, STUDY: XR ANKLE RIGHT 3+ VIEWS; 1/22/2024 2:15 pm   INDICATION: Signs/Symptoms:Evuluate source of infection, osteomyelitis;   COMPARISON: None available.   ACCESSION NUMBER(S): [id number]   ORDERING CLINICIAN: BAN GIFFORD   TECHNIQUE: Views: AP, Lateral, Oblique, right ankle   FINDINGS: RESULT: There is no evidence for fracture or dislocation. The ankle mortise is intact. Joint spaces appear adequately maintained. No bony erosion to suggest osteomyelitis. No bone lesion or soft tissue abnormality is identified. No subcutaneous gas is seen.       No evidence for acute osseous abnormality. No bony erosion to suggest osteomyelitis.   Signed by: Real Mendieta 1/22/2024 3:14 PM Dictation workstation:   [id number]    Transthoracic Echo (TTE) Complete    Result Date: 1/22/2024           [place] [address]            Phone [phone] TRANSTHORACIC ECHOCARDIOGRAM REPORT  Patient Name:      BASIA Gray Physician:   [id number] Fausto Rowe DO Study Date:        1/22/2024           Ordering Provider:   [id number] ERI BERMAN MRN/PID:           [id number]            Fellow: Accession#:        [id number]        Nurse: Date of Birth/Age: 1950 / 73 years Sonographer:         Tabatha Page RDCS Gender:            F                   Additional Staff: Height:            157.00 cm           Admit Date: Weight:            75.00 kg            Admission Status:    Inpatient - Routine BSA:               1.76 m2             Department  Location: [facility] ICU Blood Pressure: 88 /49 mmHg Study Type:    TRANSTHORACIC ECHO (TTE) COMPLETE Diagnosis/ICD: Chronic combined systolic (congestive) and diastolic (congestive)                heart failure (CHF)-I50.42; Sepsis, unspecified organism-A41.9 Indication:    heart failure,septic shock CPT Codes:     Echo Complete w Full Doppler-[id number] Patient History: BMI:               Obese >30 Pertinent History: Sepsis, PVD, A-Fib, CVA, Cancer and HTN. breast                    prosthesis/ca, ESRD,anemia,septic shock,heart failure. Study Detail: The following Echo studies were performed: 2D, M-Mode, Doppler and               color flow. Technically challenging study due to prosthesis,               prominent lung artifact and body habitus.  PHYSICIAN INTERPRETATION: Left Ventricle: Left ventricular systolic function is normal, with an estimated ejection fraction of 70%. There are no regional wall motion abnormalities. The left ventricular cavity size is normal. Left ventricular diastolic filling was indeterminate. Left Atrium: The left atrium is moderately dilated. Right Ventricle: The right ventricle is normal in size. There is normal right ventricular global systolic function. Right Atrium: The right atrium is normal in size. Aortic Valve: The aortic valve is trileaflet. There is no evidence of aortic valve regurgitation. The peak instantaneous gradient of the aortic valve is 2.8 mmHg. Mitral Valve: The mitral valve is normal in structure. There is no evidence of mitral valve regurgitation. Tricuspid Valve: The tricuspid valve is structurally normal. There is trace tricuspid regurgitation. Pulmonic Valve: The pulmonic valve is not well visualized. The pulmonic valve regurgitation was not well visualized. Pericardium: There is no pericardial effusion noted. Aorta: The aortic root is normal.  CONCLUSIONS:  1. Left ventricular systolic function is normal with a 70% estimated ejection fraction.  2. The left atrium is  moderately dilated.  3. Left ventricular diastolic filling indeterminate. QUANTITATIVE DATA SUMMARY: 2D MEASUREMENTS:                          Normal Ranges: LAs:           4.50 cm   (2.7-4.0cm) IVSd:          0.61 cm   (0.6-1.1cm) LVPWd:         0.88 cm   (0.6-1.1cm) LVIDd:         3.66 cm   (3.9-5.9cm) LV Mass Index: 41.9 g/m2 LV SYSTOLIC FUNCTION BY 2D PLANIMETRY (MOD):                     Normal Ranges: EF-A4C View: 68.3 % (>=55%) LV DIASTOLIC FUNCTION:                     Normal Ranges: MV Peak E: 1.19 m/s (0.7-1.2 m/s) MV Peak A: 0.90 m/s (0.42-0.7 m/s) E/A Ratio: 1.32     (1.0-2.2) MITRAL VALVE:                 Normal Ranges: MV DT: 192 msec (150-240msec) AORTIC VALVE:                         Normal Ranges: AoV Vmax:      0.84 m/s (<=1.7m/s) AoV Peak P.8 mmHg (<20mmHg) LVOT Max Shimon:  0.47 m/s (<=1.1m/s) LVOT Diameter: 1.90 cm  (1.8-2.4cm) AoV Area,Vmax: 1.57 cm2 (2.5-4.5cm2) TRICUSPID VALVE/RVSP:                   Normal Ranges: IVC Diam: 1.05 cm  [id number] [facility] Electronically signed on 2024 at 2:53:04 PM  ** Final **     XR foot left 3+ views    Result Date: 2024  Interpreted By:  Real Mendieta, STUDY: XR FOOT LEFT 1-2 VIEWS; 2024 4:15 pm   INDICATION: Signs/Symptoms:osteomyelitis. Pain   COMPARISON: 2023   ACCESSION NUMBER(S): [id number]   ORDERING CLINICIAN: BAN GIFFORD   TECHNIQUE: Views:  AP, Lat, Oblique, left foot   FINDINGS: RESULT: There is no evidence for fracture or dislocation. 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 Mild hammertoe deformities. Joint spaces appear adequately maintained. Soft tissue ulceration of the posterior aspect of the heel however no evidence for bony erosion to suggest osteomyelitis.       No evidence for acute fracture or acute bony erosion to suggest osteomyelitis. Chronic changes as described.   Signed by: Real Mendieta 2024 7:15 PM Dictation workstation:   [id number]    CT chest abdomen pelvis  wo IV contrast    Result Date: 1/21/2024  Interpreted By:  Maria Garcia, STUDY: CT CHEST ABDOMEN PELVIS WO CONTRAST;  1/20/2024 11:42 pm   INDICATION: Mental status change. Elevated white blood cell count with infectious workup.   COMPARISON: 08/20/2023   ACCESSION NUMBER(S): [id number]   ORDERING CLINICIAN: BAN GIFFORD   TECHNIQUE: CT of the chest, abdomen and pelvis was performed with no oral or intravenous contrast administered. Sagittal and coronal reformations were completed by the technologist at the acquisition scanner.   All CT examinations are performed with 1 or more of the following dose reduction techniques: Automated exposure control, adjustment of mA and/or kv according to patient's size, or use of iterative reconstruction techniques.   FINDINGS: Please note that the study is limited without intravenous contrast.   CHEST: Bilateral pleural effusions are present with right effusion moderately small in size and with the left effusion moderately small in size as well. There is shift of the heart and mediastinum to the left of midline due to atelectasis of the left upper lobe. There is consolidation throughout left lower lobe with air bronchograms noted. On the right, there is compressive atelectasis seen posteriorly in the right lower lobe.   There is mild reactive mediastinal adenopathy seen at this time with mediastinal lymph nodes increased in size since prior study. Several of these mediastinal nodes are at the upper limits of normal measuring 8 mm in short axis diameter.   No pericardial effusion is present. The cardiac size is normal with mitral annulus calcification.   There has been prior bilateral mastectomy with reconstruction of the left breast with implant placement. Surgical clips are visible within the left axilla. Stent grafts are visible within the left upper extremity and within the left innominate, subclavian as well as axillary veins.   A peripherally calcified 1.8 cm nodule arises  from the lower pole of left thyroid lobe.   ABDOMEN:   LIVER: Unremarkable.   BILE DUCTS: No intrahepatic or extrahepatic biliary ductal dilatation is seen.   GALLBLADDER: Cholelithiasis is observed with some calcification of the gallbladder wall demonstrated as well. No gallbladder wall thickening or pericholecystic fluid is evident.   PANCREAS: Unremarkable   SPLEEN: Unremarkable   ADRENAL GLANDS: Unremarkable   KIDNEYS AND URETERS: Kidneys are small in size with extensive renal artery calcification demonstrated.   PELVIS:   BLADDER: Amos catheter is present within the decompressed urinary bladder.   REPRODUCTIVE ORGANS: Calcification of the arcuate arteries within the uterus is seen. There is no uterine enlargement or adnexal mass.   BOWEL: A rectal balloon is seen. There is no abnormal distention of the intestinal tract.   VESSELS: There is atherosclerosis of the thoracoabdominal aorta and iliac arteries with calcification in the walls of the celiac, splenic, hepatic, and mesenteric arteries.   PERITONEUM/RETROPERITONEUM/LYMPH NODES: There is a minimal amount of ascites seen about the liver and spleen with mild presacral edema noted.   ABDOMINAL WALL: There is anasarca involving the soft tissues of the abdomen and pelvis. Postoperative change from ventral hernia repair is seen.   BONES: Bilateral sacroiliitis is seen. There is lumbar dextroscoliosis. Chronic rotator cuff tear is present bilaterally with osteoarthritis of both shoulders, right greater than left.       Chest 1.  Moderately small bilateral pleural effusions with left upper lobe atelectasis and compressive atelectasis seen posteriorly in right lower lobe. A left lower lobe pneumonia is identified. There is extensive airspace consolidation within left lower lobe with air bronchograms observed. Mild mediastinal reactive adenopathy is present as well.   Abdomen-Pelvis 1.  Cholelithiasis without evidence of cholecystitis. 2. Small amount of ascites  with mild presacral edema and anasarca within the soft tissues of the abdominal wall. 3. Renal atrophy with extensive vascular calcifications.     MACRO: None   Signed by: Maria Garcia 1/21/2024 8:30 AM Dictation workstation:   [id number]    CT head wo IV contrast    Result Date: 1/21/2024  Interpreted By:  Maria Garcia, STUDY: CT HEAD WO IV CONTRAST 1/20/2024 11:42 pm   INDICATION: Signs/Symptoms:mental status changes   COMPARISON: 12/11/2023   ACCESSION NUMBER(S): [id number]   ORDERING CLINICIAN: BAN GIFFORD   TECHNIQUE: Unenhanced axial images of the brain are completed.   All CT examinations are performed with 1 or more of the following dose reduction techniques: Automated exposure control, adjustment of mA and/or kv according to patient's size, or use of iterative reconstruction techniques.   FINDINGS: Helical unenhanced axial images of the brain demonstrate a mild to moderate degree of ventricular enlargement with proportionate widening of the sulci and sylvian fissures. There is no midline shift, mass effect, extra-axial fluid collection, or acute intracranial hemorrhage. There is diminished density seen in the periventricular white matter indicating chronic microvascular ischemic disease. There is calcified plaque seen within the distal vertebral and internal carotid arteries bilaterally. No calvarial abnormality is seen.       Atrophy and chronic microvascular ischemic disease without acute intracranial process.   Signed by: Maria Garcia 1/21/2024 8:09 AM Dictation workstation:   [id number]    XR chest 1 view    Result Date: 1/20/2024  Interpreted By:  Brendon Perez, STUDY: XR CHEST 1 VIEW; 1/20/2024 5:03 pm   INDICATION: CLINICAL INFORMATION: Signs/Symptoms:Insertion right IJ central line, also left thoracentesis.   COMPARISON: 01/19/2024 at 1338 hours   ACCESSION NUMBER(S): [id number]   ORDERING CLINICIAN: BOZENA ANTONIO   TECHNIQUE: Portable chest one view.   FINDINGS: The cardiac size is indeterminate  in view of the AP projection. There is no change in the right-sided dual port central venous catheter. There is a new right internal jugular venous catheter present with the tip at approximately same level as the dual port central venous catheter, overlying the mid to lower right atrium. There is no evidence for pneumothorax. Patient has a history of left thoracentesis without evidence for pneumothorax on the left. There is bilateral basilar alveolar infiltrate and effusions. Left effusion is decreased compared to the study from 1 day earlier.   Surgical clips are identified within left chest wall. Endovascular stent is identified in the distribution of the left subclavian artery.       1. Status post right-sided central venous catheter placement as described above with the tip overlying the mid to caudal aspect of the right atrium. There is no evidence for pneumothorax. 2. No evidence for left-sided pneumothorax after left-sided thoracentesis. Decrease in the left effusion. 3. Bilateral basilar infiltrates and effusions are present. Follow-up to assure complete clearing is suggested.   MACRO: none   Signed by: Brendon Perez 1/20/2024 5:11 PM Dictation workstation:   [id number]    XR chest 1 view    Result Date: 1/19/2024  Interpreted By:  Real Mendieta, STUDY: XR CHEST 1 VIEW; 1/19/2024 1:38 pm   INDICATION: Signs/Symptoms:dyspnea.   COMPARISON: 12/26/2023   ACCESSION NUMBER(S): [id number]   ORDERING CLINICIAN: EMELY GOLDSMITH   TECHNIQUE: 1 view of the chest was performed.   FINDINGS: There may be a minimal right pleural effusion. Slight prominence of the interstitium otherwise the right lung is clear. Improved appearance of the left lung with improved aeration of the right upper lobe. There is opacification of the left lower lobe possibly due to large pleural effusion which is similar to the prior study. No pneumothorax. Right-sided dual lumen central line catheter with tip at the proximal atrium. The  cardiomediastinal silhouette is within normal limits. Left-sided subclavian stents are again noted.       Improved aeration and appearance of the left lung superiorly however there is a persistent large left pleural effusion and opacification of the left lower lobe.   Signed by: Real Mendieta 1/19/2024 1:44 PM Dictation workstation:   [id number]         Assessment / Plan       PROBLEM LIST:  - End stage heart failure with preserved EF - on vasopressors  - End stage renal disease on HD  - Cardiogenic shock - will continue to wean down levo her blood pressure measured it does not reflect of the actual blood pressure as the patient is fully awake despite readings of systolic in the 40s and 50s arterial line was also inaccurate due to vasculopathy  - Acute metabolic encephalopathy  - Failure to wean from vasopressors  - Goals of care discussion    MANAGEMENT PLAN:  - Start weaning levo and assess mental status  - HD per renal    -Already on Fluorinef and midodrine tid  - Palliative care consult and discussed with them the case today   - Cardiology consultation - patient's daughter declined a RHC  - Peer to peer done with insurance, NOT approved  - Cont Doxy and Vanc PO. Meropenem pseudomonas in swab culture from right heal   Appreciate ID consult.  - Routine ICU care  - PT/OT  Overall prognosis poor due to chronic medical problems including end-stage renal disease heart failure patient is bedridden with decubitus ulcers  I have personally interviewed and examined the patient.  I have personally verified elements of the exam listed above. Interval changes or irregularities are as noted.  I have personally and independently reviewed laboratory, radiographic and procedural data.  I have personally reviewed the problem list above and concur. Changes, if any, are noted.  I have personally reviewed the plan list above and concur. Changes, if any, are noted.    The patient has high probability of compromise including but  not limited to organ system failure, mechanical respiratory and circulatory support, cardiac arrest and death. I have discussed this in detail with the family / caregivers.    No change in plan overall    Tarek Gharibeh MD

## 2024-02-20 NOTE — PROGRESS NOTES
Ayanna Gross is a 73 y.o. female on day 32 of admission presenting with Septic shock (CMS/HCC).    Subjective   Interval History:    patient back on Levophed  Awake, alert  Afebrile  Reported to have diarrhea-fecal management system in place    Review of Systems   All other systems reviewed and are negative.      Objective   Range of Vitals (last 24 hours)  Heart Rate:  []   Temp:  [36.2 °C (97.2 °F)-36.8 °C (98.2 °F)]   Resp:  [14-27]   BP: ()/()   Weight:  [102 kg (225 lb 8.5 oz)-103 kg (227 lb 15.3 oz)]   SpO2:  [94 %-100 %]   Daily Weight  02/20/24 : 102 kg (225 lb 8.5 oz)    Body mass index is 41.25 kg/m².    Physical Exam  Constitutional:       Appearance: Awake, alert  HENT:      Head: Normocephalic and atraumatic.      Nose: Nose normal.   Eyes:      Extraocular Movements: Extraocular movements intact.      Conjunctiva/sclera: Conjunctivae normal.   Cardiovascular:      Heart sounds: Normal heart sounds, S1 normal and S2 normal.   Pulmonary:      Breath sounds: Decreased breath sounds present.   Abdominal:      General: Bowel sounds are normal.      Palpations: Abdomen is soft.   Musculoskeletal:      Cervical back: Normal range of motion and neck supple.   Skin:     Comments: Bilateral posterior heel eschar -photos examined  Neurological:      Mental Status: She is awake, alert       Antibiotics  aspirin tablet 325 mg  acetaminophen (Tylenol) tablet 650 mg  cefepime (Maxipime) 1 g in dextrose 5 % 50 mL IV  sodium chloride 0.9 % bolus 2,229 mL  apixaban (Eliquis) tablet 2.5 mg  escitalopram (Lexapro) tablet 10 mg  febuxostat (Uloric) tablet 40 mg  fenofibrate (Triglide) tablet 160 mg  gabapentin (Neurontin) capsule 100 mg  midodrine (Proamatine) tablet 15 mg  nystatin (Mycostatin) 100,000 unit/gram powder  oxyCODONE-acetaminophen (Percocet) 5-325 mg per tablet 1 tablet  simvastatin (Zocor) tablet 20 mg  piperacillin-tazobactam-dextrose (Zosyn) IV 2.25 g  vancomycin (Vancocin) capsule 125  mg  oxygen (O2) therapy  dextrose 50 % injection 25 g  glucagon (Glucagen) injection 1 mg  dextrose 10 % in water (D10W) infusion  insulin lispro (HumaLOG) injection 0-10 Units  nystatin (Mycostatin) 100,000 unit/gram powder 1 Application  vancomycin-diluent combo no.1 (Xellia) IVPB 1,750 mg  piperacillin-tazobactam-dextrose (Zosyn) IV 2.25 g  sodium chloride 0.9 % bolus 500 mL  norepinephrine (Levophed) 8 mg in dextrose 5% 250 mL (0.032 mg/mL) infusion (premix)  vancomycin (Vancocin) placeholder  pantoprazole (ProtoNix) EC tablet 40 mg  pantoprazole (ProtoNix) injection 40 mg  sennosides-docusate sodium (Judy-Colace) 8.6-50 mg per tablet 1 tablet  doxycycline (Vibramycin) in dextrose 5 % in water (D5W) 100 mL  mg  LORazepam (Ativan) injection 2 mg  magnesium sulfate IV 2 g  zinc oxide 20 % ointment 1 Application  nystatin (Mycostatin) cream  norepinephrine (Levophed) 8 mg in dextrose 5% 250 mL (0.032 mg/mL) infusion (premix)  heparin 1,000 unit/mL injection 2,000 Units  heparin 1,000 unit/mL injection 2,000 Units  nystatin (Mycostatin) ointment  acetaminophen (Tylenol) oral liquid 1,000 mg  albumin human 25 % solution 12.5 g  perflutren lipid microspheres (Definity) injection 0.5-10 mL of dilution  sulfur hexafluoride microsphr (Lumason) injection 24.28 mg  perflutren protein A microsphere (Optison) injection 0.5 mL  ipratropium-albuteroL (Duo-Neb) 0.5-2.5 mg/3 mL nebulizer solution 3 mL  sodium chloride 3 % nebulizer solution 3 mL  vancomycin-diluent combo no.1 (Xellia) IVPB 750 mg  sennosides-docusate sodium (Judy-Colace) 8.6-50 mg per tablet 1 tablet  acetaminophen (Tylenol) tablet 650 mg  doxycycline (Vibramycin) capsule 100 mg  magnesium oxide (Mag-Ox) tablet 400 mg  vasopressin (Vasostrict) 0.2 unit/mL infusion  norepinephrine (Levophed) 8 mg in dextrose 5% 250 mL (0.032 mg/mL) infusion (premix)  heparin 1,000 unit/mL injection 2,000 Units  heparin 1,000 unit/mL injection 2,000 Units  albumin human 25 %  solution 12.5 g  vancomycin (Xellia) 1 g in 200 mL (Xellia) IVPB 1 g  ipratropium-albuteroL (Duo-Neb) 0.5-2.5 mg/3 mL nebulizer solution 3 mL  sodium chloride 3 % nebulizer solution 3 mL  albumin human 5 % infusion 12.5 g  heparin 1,000 unit/mL injection 2,000 Units  heparin 1,000 unit/mL injection 2,000 Units  vancomycin (Vancocin) capsule 125 mg  albumin human 25 % solution 12.5 g  heparin 1,000 unit/mL injection 2,000 Units  heparin 1,000 unit/mL injection 2,000 Units  ipratropium-albuteroL (Duo-Neb) 0.5-2.5 mg/3 mL nebulizer solution 3 mL  lidocaine (Xylocaine) 10 mg/mL (1 %) injection 50 mg  sodium phosphate 15 mmol in sodium chloride 0.9% 250 mL IV  sod phos di, mono-K phos mono (K Phos Neutral) tablet 250 mg  potassium, sodium phosphates (Phos-NaK) 280-160-250 mg packet 1 packet  doxycycline (Vibramycin) capsule 100 mg  fludrocortisone (Florinef) tablet 0.1 mg  gabapentin (Neurontin) capsule 100 mg  acetaminophen (Tylenol) tablet 1,000 mg  benzocaine-menthoL (Dermoplast) topical spray  vancomycin (Vancocin) capsule 125 mg  heparin 1,000 unit/mL injection 2,000 Units  heparin 1,000 unit/mL injection 2,000 Units  magnesium sulfate IV 2 g  albumin human 25 % solution 12.5 g  promethazine (Phenergan) injection 12.5 mg  ondansetron (Zofran) injection 4 mg  lidocaine (Xylocaine) 10 mg/mL (1 %) injection 50 mg  alteplase (Cathflo Activase) injection 2 mg  acetaminophen (Tylenol) tablet 650 mg  psyllium (Metamucil) 3.4 gram packet 1 packet  perflutren lipid microspheres (Definity) injection 0.5-10 mL of dilution  sulfur hexafluoride microsphr (Lumason) injection 24.28 mg  perflutren protein A microsphere (Optison) injection 0.5 mL  epoetin di-epbx (Retacrit) injection 10,000 Units  sulfur hexafluoride microsphr (Lumason) injection 24.28 mg  perflutren lipid microspheres (Definity) injection 0.5-10 mL of dilution  perflutren lipid microspheres (Definity) injection 0.5-10 mL of dilution  sulfur hexafluoride microsphr  (Lumason) injection 24.28 mg  perflutren protein A microsphere (Optison) injection 0.5 mL  ammonium lactate (Lac-Hydrin) 12 % lotion 1 Application  norepinephrine (Levophed) 8 mg in dextrose 5% 250 mL (0.032 mg/mL) infusion (premix)  fludrocortisone (Florinef) tablet 0.1 mg  albumin human 25 % solution 25 g  albumin human 25 % solution 25 g  meropenem (Merrem) 500 mg in sodium chloride 0.9 % 100 mL IV  potassium chloride 20 mEq in 100 mL IV premix  potassium chloride 20 mEq in 100 mL IV premix  magnesium sulfate IV 2 g  cefepime (Maxipime) 1 g in dextrose 5 % 50 mL IV  vancomycin-diluent combo no.1 (Xellia) IVPB 750 mg  albumin human 25 % solution 12.5 g  vancomycin (Xellia) 1 g in 200 mL (Xellia) IVPB 1 g  vancomycin (Vancocin) placeholder  vancomycin (Xellia) 1 g in 200 mL (Xellia) IVPB 1 g  midodrine (Proamatine) tablet 20 mg  vancomycin-diluent combo no.1 (Xellia) IVPB 750 mg  magnesium sulfate IV 2 g      Relevant Results  Labs  Results from last 72 hours   Lab Units 02/20/24  0428 02/19/24  0501 02/18/24  0500   WBC AUTO x10*3/uL 12.3* 12.8* 13.3*   HEMOGLOBIN g/dL 8.3* 8.0* 8.0*   HEMATOCRIT % 26.4* 25.6* 26.1*   PLATELETS AUTO x10*3/uL 181 218 198     Results from last 72 hours   Lab Units 02/20/24  0428 02/19/24  0501 02/18/24  0500   SODIUM mmol/L 132* 132* 132*   POTASSIUM mmol/L 3.7 3.4 3.2*   CHLORIDE mmol/L 99 99 98   CO2 mmol/L 20* 18* 20*   BUN mg/dL 27* 36* 29*   CREATININE mg/dL 2.60* 3.20* 2.70*   GLUCOSE mg/dL 179* 184* 216*   CALCIUM mg/dL 8.2* 8.9 9.0   ANION GAP mmol/L 13 15 14   EGFR mL/min/1.73m*2 19* 15* 18*         Estimated Creatinine Clearance: 22.2 mL/min (A) (by C-G formula based on SCr of 2.6 mg/dL (H)).  C-Reactive Protein   Date Value Ref Range Status   12/25/2023 5.20 (H) 0.00 - 2.00 mg/dL Final     CRP   Date Value Ref Range Status   06/23/2023 19.9 (H) 0 - 2.0 MG/DL Final     Comment:     Performed at [address] [address]   05/22/2022 1.0 0 - 2.0 MG/DL  Final     Comment:     Performed at [place] [address]     Microbiology  Susceptibility data from last 14 days.  Collected Specimen Info Organism Ampicillin Aztreonam Cefazolin Cefepime Ceftazidime Ciprofloxacin Clindamycin Erythromycin Gentamicin Levofloxacin Oxacillin Piperacillin/Tazobactam Tetracycline Tobramycin   02/10/24 Tissue/Biopsy from Other (specify in comments) Proteus mirabilis S  S   S   S S  S R      Methicillin Resistant Staphylococcus aureus (MRSA)       R R   R  R      Pseudomonas aeruginosa                 02/10/24 Tissue/Biopsy from Wound/Tissue Proteus mirabilis S  S   S   S S  S R      Pseudomonas aeruginosa  S  S S S    S  S  S     Collected Specimen Info Organism Trimethoprim/Sulfamethoxazole Vancomycin   02/10/24 Tissue/Biopsy from Other (specify in comments) Proteus mirabilis S      Methicillin Resistant Staphylococcus aureus (MRSA) S S     Pseudomonas aeruginosa     02/10/24 Tissue/Biopsy from Wound/Tissue Proteus mirabilis S      Pseudomonas aeruginosa      reviewed  Imaging  Transthoracic Echo (TTE) Limited    Result Date: 2/7/2024           [place] [address]            Phone [phone] TRANSTHORACIC ECHOCARDIOGRAM REPORT  Patient Name:      BASIA Gray Physician:    [id number] Littlejohn DO Study Date:        2/7/2024            Ordering Provider:    Shirley LITTLEJOHN MRN/PID:           [id number]            Fellow: Accession#:        [id number]        Nurse: Date of Birth/Age: 1950 / 73 years Sonographer:          Rena QUARLES,                                                              YAEL, FABIAN Gender:            F                   Additional Staff: Height:            157.48 cm            Admit Date: Weight:                                Admission Status:     Inpatient - Routine BSA:               m2                  Department Location:  [facility] ICU Blood Pressure: 95 /42 mmHg Study Type:    TRANSTHORACIC ECHO (TTE) LIMITED Diagnosis/ICD: Hypotension, unspecified-I95.9; Mitral valve disorder-I05.9 Indication:    Limited to assess for MS, Hypotension CPT Codes:     Echo Limited-65364; Color Doppler-35177; Doppler Limited-47286 Patient History: Pertinent History: Hypotension, hx of MV endocarditis, PAD, DM2 ESRD on Hd. Study Detail: The following Echo studies were performed: 2D, M-Mode, Doppler and               color flow. Technically challenging study due to poor acoustic               windows and L Breast implant. Unable to obtain suprasternal notch               view.  PHYSICIAN INTERPRETATION: Left Ventricle: Left ventricular systolic function is normal. There are no regional wall motion abnormalities. The left ventricular cavity size is normal. Left ventricular diastolic filling was not assessed. Left Atrium: The left atrium is moderate to severely dilated. Right Ventricle: The right ventricle is normal in size. There is normal right ventricular global systolic function. Right Atrium: The right atrium was not well visualized. Aortic Valve: The aortic valve was not assessed. Aortic valve regurgitation was not assessed. Mitral Valve: The mitral valve is abnormal. There is evidence of mild mitral valve stenosis. The doppler estimated mean and peak diastolic pressure gradients are 4.0 mmHg and 6.6 mmHg respectively. There is severe mitral annular calcification. There is no evidence of mitral valve regurgitation. Tricuspid Valve: The tricuspid valve was not assessed. Tricuspid regurgitation was not assessed. Pulmonic Valve: The pulmonic valve is not well visualized. The pulmonic valve regurgitation was not assessed. Pericardium: There is no pericardial effusion noted. Aorta: The aortic root was  not assessed.  CONCLUSIONS:  1. Left ventricular systolic function is normal.  2. The left atrium is moderate to severely dilated.  3. There is severe mitral annular calcification. QUANTITATIVE DATA SUMMARY: LV DIASTOLIC FUNCTION:                        Normal Ranges: MV Peak E:    1.23 m/s (0.7-1.2 m/s) MV Peak A:    1.12 m/s (0.42-0.7 m/s) E/A Ratio:    1.10     (1.0-2.2) MV lateral e' 0.04 m/s MV medial e'  0.07 m/s MITRAL VALVE:                      Normal Ranges: MV Vmax:    1.28 m/s (<=1.3m/s) MV peak P.6 mmHg (<5mmHg) MV mean P.0 mmHg (<48mmHg)  RIGHT VENTRICLE: RV Basal 2.97 cm  [id number] Lemus DO Electronically signed on 2024 at 3:50:21 PM  ** Final **     Transthoracic Echo (TTE) Limited    Result Date: 2024           [place] [address]            Phone [phone] TRANSTHORACIC ECHOCARDIOGRAM REPORT  Patient Name:      BASIA GUAN GARY Gray Physician:    [id number] Lemus DO Study Date:        2024            Ordering Provider:    [id number] REBECA RESENDEZ MRN/PID:           [id number]            Fellow: Accession#:        [id number]        Nurse: Date of Birth/Age: 1950 / 73 years Sonographer:          Jennifer WALL Gender:            F                   Additional Staff: Height:            157.48 cm           Admit Date:           2024 Weight:            98.88 kg            Admission Status:     Inpatient - Routine BSA:               1.98 m2             Department Location: Blood Pressure: 59 /49 mmHg Study Type:    TRANSTHORACIC ECHO (TTE) LIMITED Diagnosis/ICD: Acute on chronic diastolic (congestive) heart failure                (CHF)-I50.33 Indication:    Acute on chronic diastolic congestive heart failure CPT Codes:     Echo Limited-93511; Color Doppler-13870; Doppler Limited-86798  Patient History: Pertinent History: PAF Peripheralvascular disease low blood pressure HTN Breast                    Cancer Mixed Hyperlipidemia HF CVA Hypotension DMII CAD CKD. Study Detail: The following Echo studies were performed: 2D, M-Mode, Doppler and               color flow. Technically challenging study due to body habitus,               patient lying in supine position, poor acoustic windows, prominent               lung artifact and Breast Prosthesis. Definity used as a contrast               agent for endocardial border definition. Unable to obtain               suprasternal notch view.  PHYSICIAN INTERPRETATION: Left Ventricle: Left ventricular systolic function is normal, with an estimated ejection fraction of 60-65%. There are no regional wall motion abnormalities. The left ventricular cavity size is normal. Left ventricular diastolic filling was indeterminate. Left Atrium: The left atrium is moderately dilated. Right Ventricle: The right ventricle is normal in size. There is normal right ventricular global systolic function. Right Atrium: The right atrium is normal in size. Aortic Valve: The aortic valve is trileaflet. There is no evidence of aortic valve regurgitation. The peak instantaneous gradient of the aortic valve is 5.3 mmHg. Mitral Valve: The mitral valve is normal in structure. There is severe mitral annular calcification. There is trace mitral valve regurgitation. Tricuspid Valve: The tricuspid valve is structurally normal. There is mild tricuspid regurgitation. Pulmonic Valve: The pulmonic valve is not well visualized. The pulmonic valve regurgitation was not well visualized. Pericardium: There is no pericardial effusion noted. Aorta: The aortic root is normal.  CONCLUSIONS:  1. Left ventricular systolic function is normal with a 60-65% estimated ejection fraction.  2. The left atrium is moderately dilated.  3. There is severe mitral annular calcification. QUANTITATIVE DATA SUMMARY: 2D  MEASUREMENTS:                          Normal Ranges: LAs:           3.80 cm   (2.7-4.0cm) IVSd:          0.92 cm   (0.6-1.1cm) LVPWd:         1.09 cm   (0.6-1.1cm) LVIDd:         2.98 cm   (3.9-5.9cm) LVIDs:         2.12 cm LV Mass Index: 41.2 g/m2 LV % FS        28.9 % LA VOLUME:                               Normal Ranges: LA Vol A4C:        58.3 ml    (22+/-6mL/m2) LA Vol A2C:        60.9 ml LA Vol BP:         62.1 ml LA Vol Index A4C:  29.4ml/m2 LA Vol Index A2C:  30.7 ml/m2 LA Vol Index BP:   31.3 ml/m2 LA Area A4C:       20.5 cm2 LA Area A2C:       20.1 cm2 LA Major Axis A4C: 6.1 cm LA Major Axis A2C: 5.6 cm LA Volume Index:   29.3 ml/m2 LA Vol A4C:        53.1 ml LA Vol A2C:        58.4 ml LV SYSTOLIC FUNCTION BY 2D PLANIMETRY (MOD):                     Normal Ranges: EF-A4C View: 68.2 % (>=55%) EF-A2C View: 65.9 % EF-Biplane:  67.8 % AORTIC VALVE:                         Normal Ranges: AoV Vmax:      1.15 m/s (<=1.7m/s) AoV Peak P.3 mmHg (<20mmHg) LVOT Max Shimon:  1.11 m/s (<=1.1m/s) LVOT VTI:      16.20 cm LVOT Diameter: 1.90 cm  (1.8-2.4cm) AoV Area,Vmax: 2.74 cm2 (2.5-4.5cm2) PULMONIC VALVE:                         Normal Ranges: PV Accel Time: 63 msec  (>120ms) PV Max Shimon:    1.0 m/s  (0.6-0.9m/s) PV Max P.3 mmHg  [id number] Herminio Lemus DO Electronically signed on 2024 at 7:58:25 AM  ** Final **     Upper extremity venous duplex bilateral    Result Date: 2024           [place] [address]            Phone [phone]  Vascular Lab Report  VASC US UPPER EXTREMITY VENOUS DUPLEX BILATERAL Patient Name:      BASIA Gray Physician:  [id number] Mini Busby MD, RPVI Study Date:        2024           Ordering Provider:  [id number] ERI BERMAN MRN/PID:           [id number]            Fellow: Accession#:        [id number]        Technologist:       Dara Parr T Date  of Birth/Age: 1950 / 73 years Technologist 2: Gender:            F                   Encounter#:         [id number] Admission Status:  Inpatient           Location Performed: [facility]  Diagnosis/ICD: Right arm swelling-M79.89; Left arm swelling-M79.89 CPT Codes:     70756 Peripheral venous duplex scan for DVT complete  CONCLUSIONS:  Right Upper Venous: No evidence of acute deep vein thrombus visualized in the right upper extremity. Internal jugular vein was visualized in segments due to IV lines and bandages. Left Upper Venous: No evidence of acute deep vein thrombus visualized in the left upper extremity. Subclavian stent is noted and appears patent. There is a known occluded dialysis access noted.  Additional Findings: Technically difficult exam due to IV lines, bandages, and patient's positioning.  Imaging & Doppler Findings:  Right               Compressible Thrombus        Flow Internal Jugular        Yes        None   Spontaneous/Phasic Subclavian              Yes        None Subclavian Proximal     Yes        None   Spontaneous/Phasic Subclavian Mid          Yes        None Subclavian Distal       Yes        None   Spontaneous/Phasic Axillary                Yes        None       Pulsatile Brachial                Yes        None Cephalic                Yes        None Basilic                 Yes        None  Left                Compress Thrombus        Flow Internal Jugular      Yes      None       Pulsatile Subclavian            Yes      None Subclavian Proximal   Yes      None       Pulsatile Subclavian Mid        Yes      None       Pulsatile Subclavian Distal     Yes      None       Pulsatile Axillary              Yes      None   Spontaneous/Phasic Brachial              Yes      None Cephalic              Yes      None Basilic               Yes      None  [id number] Mini Busby MD, ALICE Electronically signed by [id number] Mini Busby MD, ALICE on 1/25/2024 at 4:06:13 PM  ** Final **     XR tibia  fibula right 2 views    Result Date: 1/22/2024  Interpreted By:  Real Mendieta, STUDY: XR TIBIA FIBULA RIGHT 2 VIEWS; 1/22/2024 2:15 pm   INDICATION: Signs/Symptoms:evaluate source of infection, osteomyelitis;   COMPARISON: None available.   ACCESSION NUMBER(S): [id number]   ORDERING CLINICIAN: BAN GIFFORD   TECHNIQUE: Views: AP and Lateral of the right tibia and fibula   FINDINGS: RESULT: There is no evidence for fracture or dislocation. Tricompartmental degenerative changes of the right knee. The tibia and fibula appear intact without bony erosion or evidence for osteomyelitis. No other bony or soft tissue abnormality is identified. No subcutaneous gas is noted.       No evidence for acute osseous abnormality. No bony erosion to suggest osteomyelitis.   Signed by: Real Mendieta 1/22/2024 3:15 PM Dictation workstation:   [id number]    XR ankle right 3+ views    Result Date: 1/22/2024  Interpreted By:  Real Mendieta, STUDY: XR ANKLE RIGHT 3+ VIEWS; 1/22/2024 2:15 pm   INDICATION: Signs/Symptoms:Evuluate source of infection, osteomyelitis;   COMPARISON: None available.   ACCESSION NUMBER(S): [id number]   ORDERING CLINICIAN: BAN GIFFORD   TECHNIQUE: Views: AP, Lateral, Oblique, right ankle   FINDINGS: RESULT: There is no evidence for fracture or dislocation. The ankle mortise is intact. Joint spaces appear adequately maintained. No bony erosion to suggest osteomyelitis. No bone lesion or soft tissue abnormality is identified. No subcutaneous gas is seen.       No evidence for acute osseous abnormality. No bony erosion to suggest osteomyelitis.   Signed by: Real Mendieta 1/22/2024 3:14 PM Dictation workstation:   [id number]    Transthoracic Echo (TTE) Complete    Result Date: 1/22/2024           [place] [address]            Phone [phone] TRANSTHORACIC ECHOCARDIOGRAM REPORT  Patient Name:      BASIA Gray Physician:   [id number] Fausto  [facility] Study Date:        1/22/2024           Ordering Provider:   [id number] ERI BERMAN MRN/PID:           [id number]            Fellow: Accession#:        [id number]        Nurse: Date of Birth/Age: 1950 / 73 years Sonographer:         Tabatha Page RDCS Gender:            F                   Additional Staff: Height:            157.00 cm           Admit Date: Weight:            75.00 kg            Admission Status:    Inpatient - Routine BSA:               1.76 m2             Department Location: [facility] Blood Pressure: 88 /49 mmHg Study Type:    TRANSTHORACIC ECHO (TTE) COMPLETE Diagnosis/ICD: Chronic combined systolic (congestive) and diastolic (congestive)                heart failure (CHF)-I50.42; Sepsis, unspecified organism-A41.9 Indication:    heart failure,septic shock CPT Codes:     Echo Complete w Full Doppler-67447 Patient History: BMI:               Obese >30 Pertinent History: Sepsis, PVD, A-Fib, CVA, Cancer and HTN. breast                    prosthesis/ca, ESRD,anemia,septic shock,heart failure. Study Detail: The following Echo studies were performed: 2D, M-Mode, Doppler and               color flow. Technically challenging study due to prosthesis,               prominent lung artifact and body habitus.  PHYSICIAN INTERPRETATION: Left Ventricle: Left ventricular systolic function is normal, with an estimated ejection fraction of 70%. There are no regional wall motion abnormalities. The left ventricular cavity size is normal. Left ventricular diastolic filling was indeterminate. Left Atrium: The left atrium is moderately dilated. Right Ventricle: The right ventricle is normal in size. There is normal right ventricular global systolic function. Right Atrium: The right atrium is normal in size. Aortic Valve: The aortic valve is trileaflet. There is no evidence of aortic valve regurgitation. The peak instantaneous gradient of the  aortic valve is 2.8 mmHg. Mitral Valve: The mitral valve is normal in structure. There is no evidence of mitral valve regurgitation. Tricuspid Valve: The tricuspid valve is structurally normal. There is trace tricuspid regurgitation. Pulmonic Valve: The pulmonic valve is not well visualized. The pulmonic valve regurgitation was not well visualized. Pericardium: There is no pericardial effusion noted. Aorta: The aortic root is normal.  CONCLUSIONS:  1. Left ventricular systolic function is normal with a 70% estimated ejection fraction.  2. The left atrium is moderately dilated.  3. Left ventricular diastolic filling indeterminate. QUANTITATIVE DATA SUMMARY: 2D MEASUREMENTS:                          Normal Ranges: LAs:           4.50 cm   (2.7-4.0cm) IVSd:          0.61 cm   (0.6-1.1cm) LVPWd:         0.88 cm   (0.6-1.1cm) LVIDd:         3.66 cm   (3.9-5.9cm) LV Mass Index: 41.9 g/m2 LV SYSTOLIC FUNCTION BY 2D PLANIMETRY (MOD):                     Normal Ranges: EF-A4C View: 68.3 % (>=55%) LV DIASTOLIC FUNCTION:                     Normal Ranges: MV Peak E: 1.19 m/s (0.7-1.2 m/s) MV Peak A: 0.90 m/s (0.42-0.7 m/s) E/A Ratio: 1.32     (1.0-2.2) MITRAL VALVE:                 Normal Ranges: MV DT: 192 msec (150-240msec) AORTIC VALVE:                         Normal Ranges: AoV Vmax:      0.84 m/s (<=1.7m/s) AoV Peak P.8 mmHg (<20mmHg) LVOT Max Shimon:  0.47 m/s (<=1.1m/s) LVOT Diameter: 1.90 cm  (1.8-2.4cm) AoV Area,Vmax: 1.57 cm2 (2.5-4.5cm2) TRICUSPID VALVE/RVSP:                   Normal Ranges: IVC Diam: 1.05 cm  [id number] Fausto Rowe  Electronically signed on 2024 at 2:53:04 PM  ** Final **      Assessment/Plan   Septic shock-resolved  Left-sided pleural effusion   Left lower lobe pneumonia-treated  Proteus urinary tract infection-treated  History of MRSA endocarditis  History of C. 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

## 2024-02-20 NOTE — PROGRESS NOTES
Patient remains in the ICU. Patient on levophed. At this time there is not a safe discharge plan in place. Patient will likely need a therapy evaluation. Will follow.      02/20/24 2748   Discharge Planning   Living Arrangements Spouse/significant other   Support Systems Spouse/significant other;Children;Family members   Type of Residence Private residence   Home or Post Acute Services Other (Comment)  (TBD)   Patient expects to be discharged to: TBD   Does the patient need discharge transport arranged? Yes   RoundTrip coordination needed? Yes   Financial Resource Strain   How hard is it for you to pay for the very basics like food, housing, medical care, and heating? Not very   Housing Stability   In the last 12 months, was there a time when you were not able to pay the mortgage or rent on time? N   In the last 12 months, how many places have you lived? 1   In the last 12 months, was there a time when you did not have a steady place to sleep or slept in a shelter (including now)? N   Transportation Needs   In the past 12 months, has lack of transportation kept you from medical appointments or from getting medications? no   In the past 12 months, has lack of transportation kept you from meetings, work, or from getting things needed for daily living? No

## 2024-02-21 NOTE — PROGRESS NOTES
Ayanna Gross is a 73 y.o. female on day 33 of admission presenting with Septic shock (CMS/HCC).      Subjective   Scheduled for dialysis this morning and orders were reviewed with the ICU nurse.  Lowish BP noted but otherwise asymptomatic.  No dyspnea at rest.       Scheduled medications  apixaban, 2.5 mg, oral, BID  cefepime, 1 g, intravenous, q24h  doxycylcine, 100 mg, oral, Daily  epoetin di or biosimilar, 10,000 Units, intravenous, Every Mon/Wed/Fri  escitalopram, 10 mg, oral, Nightly  fludrocortisone, 0.1 mg, oral, TID  gabapentin, 100 mg, oral, TID  heparin, 2,000 Units, intra-catheter, After Dialysis  heparin, 2,000 Units, intra-catheter, After Dialysis  heparin, 2,000 Units, intra-catheter, After Dialysis  heparin, 2,000 Units, intra-catheter, After Dialysis  insulin lispro, 0-10 Units, subcutaneous, TID with meals  ipratropium-albuteroL, 3 mL, nebulization, q6h while awake  lidocaine, 5 mL, infiltration, Once  midodrine, 20 mg, oral, TID with meals  nystatin, , Topical, BID  nystatin, , Topical, BID  pantoprazole, 40 mg, oral, Daily   Or  pantoprazole, 40 mg, intravenous, Daily  perflutren lipid microspheres, 0.5-10 mL of dilution, intravenous, Once in imaging  perflutren protein A microsphere, 0.5 mL, intravenous, Once in imaging  psyllium, 1 packet, oral, BID  sulfur hexafluoride microsphr, 2 mL, intravenous, Once in imaging  sulfur hexafluoride microsphr, 2 mL, intravenous, Once in imaging  vancomycin, 125 mg, oral, BID  zinc oxide, 1 Application, Topical, BID      Continuous medications  norepinephrine, 0.01-3 mcg/kg/min, Last Rate: 0.02 mcg/kg/min (02/21/24 0900)      PRN medications  PRN medications: acetaminophen, albumin human, alteplase, ammonium lactate, benzocaine-menthoL, dextrose 10 % in water (D10W), dextrose, glucagon, ondansetron, oxygen, sennosides-docusate sodium, vancomycin      Objective     Vitals 24HR  Heart Rate:  []   Temp:  [36.4 °C (97.5 °F)-36.8 °C (98.2 °F)]   Resp:   [14-36]   BP: (54-94)/(14-80)   Weight:  [106 kg (234 lb 2.1 oz)]   SpO2:  [96 %-100 %]     General: Elderly woman, not in distress  Access: Right IJ TDC in place  Lungs: Clear anteriorly  Heart: S1 and S2, regular  Abdomen: Soft, nontender  Extremities: UE edema, dressing over LE  Neuro: Awake and interactive      Intake/Output last 3 Shifts:    Intake/Output Summary (Last 24 hours) at 2/21/2024 0931  Last data filed at 2/21/2024 0900  Gross per 24 hour   Intake 152.17 ml   Output --   Net 152.17 ml       Relevant Results    Results for orders placed or performed during the hospital encounter of 01/19/24 (from the past 24 hour(s))   POCT GLUCOSE   Result Value Ref Range    POCT Glucose 184 (H) 74 - 99 mg/dL   POCT GLUCOSE   Result Value Ref Range    POCT Glucose 300 (H) 74 - 99 mg/dL   POCT GLUCOSE   Result Value Ref Range    POCT Glucose 175 (H) 74 - 99 mg/dL   CBC   Result Value Ref Range    WBC 14.4 (H) 4.4 - 11.3 x10*3/uL    nRBC 0.0 0.0 - 0.0 /100 WBCs    RBC 2.67 (L) 4.00 - 5.20 x10*6/uL    Hemoglobin 8.4 (L) 12.0 - 16.0 g/dL    Hematocrit 26.5 (L) 36.0 - 46.0 %    MCV 99 80 - 100 fL    MCH 31.5 26.0 - 34.0 pg    MCHC 31.7 (L) 32.0 - 36.0 g/dL    RDW 20.4 (H) 11.5 - 14.5 %    Platelets 213 150 - 450 x10*3/uL   Basic metabolic panel   Result Value Ref Range    Glucose 240 (H) 65 - 99 mg/dL    Sodium 132 (L) 133 - 145 mmol/L    Potassium 3.6 3.4 - 5.1 mmol/L    Chloride 100 97 - 107 mmol/L    Bicarbonate 18 (L) 24 - 31 mmol/L    Urea Nitrogen 37 (H) 8 - 25 mg/dL    Creatinine 3.30 (H) 0.40 - 1.60 mg/dL    eGFR 14 (L) >60 mL/min/1.73m*2    Calcium 9.0 8.5 - 10.4 mg/dL    Anion Gap 14 <=19 mmol/L   Magnesium   Result Value Ref Range    Magnesium 2.00 1.60 - 3.10 mg/dL   Vancomycin   Result Value Ref Range    Vancomycin 27.2 (H) 10.0 - 20.0 ug/mL   POCT GLUCOSE   Result Value Ref Range    POCT Glucose 220 (H) 74 - 99 mg/dL         Assessment/Plan      ESRD on HD  Hypotension, midodrine  Edema  Hyponatremia  Anemia  in CKD, on BIB    Scheduled for dialysis today orders were reviewed.  UF goal 1.5 L as tolerated.  She is on midodrine and may need additional vasopressor support to get through dialysis.    Continue other care.      Raj Mcgee MD

## 2024-02-21 NOTE — CARE PLAN
Problem: Respiratory  Goal: Verbalize decreased shortness of breath this shift  Outcome: Progressing  Goal: No signs of respiratory distress (eg. Use of accessory muscles. Peds grunting)  Outcome: Progressing

## 2024-02-21 NOTE — PROGRESS NOTES
Vancomycin Dosing by Pharmacy- FOLLOW-UP (HEMODIALYSIS)    Aaynna Gross is a 73 y.o. year old female who Pharmacy has been consulted for vancomycin dosing for Vancomycin Indications: Skin & Soft Tissue. Based on the patient's indication and renal status this patient will be dosed based on a pre-HD level of 20-25 mcg/mL.     Patient is currently on hemodialysis.    Vancomycin maintenance dose: 750 mg after dialysis on 2/19 at 1800.     Vancomycin pre-HD level 27.2 today. Getting dialysis today    Lab Results   Component Value Date    VANCORANDOM 27.2 (H) 02/21/2024       Visit Vitals  BP (!) 81/45   Pulse 90   Temp 36.8 °C (98.2 °F) (Temporal)   Resp (!) 27        Lab Results   Component Value Date    CREATININE 3.30 (H) 02/21/2024    CREATININE 2.60 (H) 02/20/2024    CREATININE 3.20 (H) 02/19/2024    CREATININE 2.70 (H) 02/18/2024       I/O last 3 completed shifts:  In: 585.9 (6.3 mL/kg) [P.O.:240; I.V.:95.9 (1 mL/kg); IV Piggyback:250]  Out: 280 (3 mL/kg) [Stool:280]  Dosing Weight: 93 kg     Assessment/Plan     Above goal random/trough level. Vancomycin will be held until next random level is obtained.  Next level will be obtained on 2/22 at 0500. May be obtained sooner if clinically indicated. If level is above goal, random level with AM labs the next morning will be obtained.   Will continue to monitor renal function daily while on vancomycin and order serum creatinine at least every 48 hours if not already ordered.  Follow for continued vancomycin needs, clinical response, and signs/symptoms of toxicity.     Kendall Alfonso, PharmD

## 2024-02-21 NOTE — PROGRESS NOTES
Ayanna Gross is a 73 y.o. female on day 33 of admission presenting with Septic shock (CMS/HCC).    Subjective   Interval History:     Afebrile, no chills  On pressors  No chest pain or shortness of breath  No nausea or vomiting  No abdominal pain    Review of Systems   All other systems reviewed and are negative.      Objective   Range of Vitals (last 24 hours)  Heart Rate:  []   Temp:  [36.4 °C (97.5 °F)-36.8 °C (98.2 °F)]   Resp:  [14-36]   BP: (54-94)/(14-80)   Weight:  [106 kg (234 lb 2.1 oz)]   SpO2:  [96 %-100 %]   Daily Weight  02/21/24 : 106 kg (234 lb 2.1 oz)    Body mass index is 42.82 kg/m².    Physical Exam  Constitutional:       Appearance: Awake, alert  HENT:      Head: Normocephalic and atraumatic.      Nose: Nose normal.   Eyes:      Extraocular Movements: Extraocular movements intact.      Conjunctiva/sclera: Conjunctivae normal.   Cardiovascular:      Heart sounds: Normal heart sounds, S1 normal and S2 normal.   Pulmonary:      Breath sounds: Decreased breath sounds present.   Abdominal:      General: Bowel sounds are normal.      Palpations: Abdomen is soft.   Musculoskeletal:      Cervical back: Normal range of motion and neck supple.   Skin:     Comments: Bilateral posterior heel eschar -photos examined  Neurological:      Mental Status: She is awake, alert       Antibiotics  aspirin tablet 325 mg  acetaminophen (Tylenol) tablet 650 mg  cefepime (Maxipime) 1 g in dextrose 5 % 50 mL IV  sodium chloride 0.9 % bolus 2,229 mL  apixaban (Eliquis) tablet 2.5 mg  escitalopram (Lexapro) tablet 10 mg  febuxostat (Uloric) tablet 40 mg  fenofibrate (Triglide) tablet 160 mg  gabapentin (Neurontin) capsule 100 mg  midodrine (Proamatine) tablet 15 mg  nystatin (Mycostatin) 100,000 unit/gram powder  oxyCODONE-acetaminophen (Percocet) 5-325 mg per tablet 1 tablet  simvastatin (Zocor) tablet 20 mg  piperacillin-tazobactam-dextrose (Zosyn) IV 2.25 g  vancomycin (Vancocin) capsule 125 mg  oxygen (O2)  therapy  dextrose 50 % injection 25 g  glucagon (Glucagen) injection 1 mg  dextrose 10 % in water (D10W) infusion  insulin lispro (HumaLOG) injection 0-10 Units  nystatin (Mycostatin) 100,000 unit/gram powder 1 Application  vancomycin-diluent combo no.1 (Xellia) IVPB 1,750 mg  piperacillin-tazobactam-dextrose (Zosyn) IV 2.25 g  sodium chloride 0.9 % bolus 500 mL  norepinephrine (Levophed) 8 mg in dextrose 5% 250 mL (0.032 mg/mL) infusion (premix)  vancomycin (Vancocin) placeholder  pantoprazole (ProtoNix) EC tablet 40 mg  pantoprazole (ProtoNix) injection 40 mg  sennosides-docusate sodium (Judy-Colace) 8.6-50 mg per tablet 1 tablet  doxycycline (Vibramycin) in dextrose 5 % in water (D5W) 100 mL  mg  LORazepam (Ativan) injection 2 mg  magnesium sulfate IV 2 g  zinc oxide 20 % ointment 1 Application  nystatin (Mycostatin) cream  norepinephrine (Levophed) 8 mg in dextrose 5% 250 mL (0.032 mg/mL) infusion (premix)  heparin 1,000 unit/mL injection 2,000 Units  heparin 1,000 unit/mL injection 2,000 Units  nystatin (Mycostatin) ointment  acetaminophen (Tylenol) oral liquid 1,000 mg  albumin human 25 % solution 12.5 g  perflutren lipid microspheres (Definity) injection 0.5-10 mL of dilution  sulfur hexafluoride microsphr (Lumason) injection 24.28 mg  perflutren protein A microsphere (Optison) injection 0.5 mL  ipratropium-albuteroL (Duo-Neb) 0.5-2.5 mg/3 mL nebulizer solution 3 mL  sodium chloride 3 % nebulizer solution 3 mL  vancomycin-diluent combo no.1 (Xellia) IVPB 750 mg  sennosides-docusate sodium (Judy-Colace) 8.6-50 mg per tablet 1 tablet  acetaminophen (Tylenol) tablet 650 mg  doxycycline (Vibramycin) capsule 100 mg  magnesium oxide (Mag-Ox) tablet 400 mg  vasopressin (Vasostrict) 0.2 unit/mL infusion  norepinephrine (Levophed) 8 mg in dextrose 5% 250 mL (0.032 mg/mL) infusion (premix)  heparin 1,000 unit/mL injection 2,000 Units  heparin 1,000 unit/mL injection 2,000 Units  albumin human 25 % solution 12.5  g  vancomycin (Xellia) 1 g in 200 mL (Xellia) IVPB 1 g  ipratropium-albuteroL (Duo-Neb) 0.5-2.5 mg/3 mL nebulizer solution 3 mL  sodium chloride 3 % nebulizer solution 3 mL  albumin human 5 % infusion 12.5 g  heparin 1,000 unit/mL injection 2,000 Units  heparin 1,000 unit/mL injection 2,000 Units  vancomycin (Vancocin) capsule 125 mg  albumin human 25 % solution 12.5 g  heparin 1,000 unit/mL injection 2,000 Units  heparin 1,000 unit/mL injection 2,000 Units  ipratropium-albuteroL (Duo-Neb) 0.5-2.5 mg/3 mL nebulizer solution 3 mL  lidocaine (Xylocaine) 10 mg/mL (1 %) injection 50 mg  sodium phosphate 15 mmol in sodium chloride 0.9% 250 mL IV  sod phos di, mono-K phos mono (K Phos Neutral) tablet 250 mg  potassium, sodium phosphates (Phos-NaK) 280-160-250 mg packet 1 packet  doxycycline (Vibramycin) capsule 100 mg  fludrocortisone (Florinef) tablet 0.1 mg  gabapentin (Neurontin) capsule 100 mg  acetaminophen (Tylenol) tablet 1,000 mg  benzocaine-menthoL (Dermoplast) topical spray  vancomycin (Vancocin) capsule 125 mg  heparin 1,000 unit/mL injection 2,000 Units  heparin 1,000 unit/mL injection 2,000 Units  magnesium sulfate IV 2 g  albumin human 25 % solution 12.5 g  promethazine (Phenergan) injection 12.5 mg  ondansetron (Zofran) injection 4 mg  lidocaine (Xylocaine) 10 mg/mL (1 %) injection 50 mg  alteplase (Cathflo Activase) injection 2 mg  acetaminophen (Tylenol) tablet 650 mg  psyllium (Metamucil) 3.4 gram packet 1 packet  perflutren lipid microspheres (Definity) injection 0.5-10 mL of dilution  sulfur hexafluoride microsphr (Lumason) injection 24.28 mg  perflutren protein A microsphere (Optison) injection 0.5 mL  epoetin di-epbx (Retacrit) injection 10,000 Units  sulfur hexafluoride microsphr (Lumason) injection 24.28 mg  perflutren lipid microspheres (Definity) injection 0.5-10 mL of dilution  perflutren lipid microspheres (Definity) injection 0.5-10 mL of dilution  sulfur hexafluoride microsphr (Lumason)  injection 24.28 mg  perflutren protein A microsphere (Optison) injection 0.5 mL  ammonium lactate (Lac-Hydrin) 12 % lotion 1 Application  norepinephrine (Levophed) 8 mg in dextrose 5% 250 mL (0.032 mg/mL) infusion (premix)  fludrocortisone (Florinef) tablet 0.1 mg  albumin human 25 % solution 25 g  albumin human 25 % solution 25 g  meropenem (Merrem) 500 mg in sodium chloride 0.9 % 100 mL IV  potassium chloride 20 mEq in 100 mL IV premix  potassium chloride 20 mEq in 100 mL IV premix  magnesium sulfate IV 2 g  cefepime (Maxipime) 1 g in dextrose 5 % 50 mL IV  vancomycin-diluent combo no.1 (Xellia) IVPB 750 mg  albumin human 25 % solution 12.5 g  vancomycin (Xellia) 1 g in 200 mL (Xellia) IVPB 1 g  vancomycin (Vancocin) placeholder  vancomycin (Xellia) 1 g in 200 mL (Xellia) IVPB 1 g  midodrine (Proamatine) tablet 20 mg  vancomycin-diluent combo no.1 (Xellia) IVPB 750 mg  magnesium sulfate IV 2 g      Relevant Results  Labs  Results from last 72 hours   Lab Units 02/21/24  0502 02/20/24  0428 02/19/24  0501   WBC AUTO x10*3/uL 14.4* 12.3* 12.8*   HEMOGLOBIN g/dL 8.4* 8.3* 8.0*   HEMATOCRIT % 26.5* 26.4* 25.6*   PLATELETS AUTO x10*3/uL 213 181 218     Results from last 72 hours   Lab Units 02/21/24  0502 02/20/24  0428 02/19/24  0501   SODIUM mmol/L 132* 132* 132*   POTASSIUM mmol/L 3.6 3.7 3.4   CHLORIDE mmol/L 100 99 99   CO2 mmol/L 18* 20* 18*   BUN mg/dL 37* 27* 36*   CREATININE mg/dL 3.30* 2.60* 3.20*   GLUCOSE mg/dL 240* 179* 184*   CALCIUM mg/dL 9.0 8.2* 8.9   ANION GAP mmol/L 14 13 15   EGFR mL/min/1.73m*2 14* 19* 15*         Estimated Creatinine Clearance: 17.9 mL/min (A) (by C-G formula based on SCr of 3.3 mg/dL (H)).  C-Reactive Protein   Date Value Ref Range Status   12/25/2023 5.20 (H) 0.00 - 2.00 mg/dL Final     CRP   Date Value Ref Range Status   06/23/2023 19.9 (H) 0 - 2.0 MG/DL Final     Comment:     Performed at [address] [address]   05/22/2022 1.0 0 - 2.0 MG/DL Final      Comment:     Performed at [place] [address]     Microbiology  Susceptibility data from last 14 days.  Collected Specimen Info Organism Ampicillin Aztreonam Cefazolin Cefepime Ceftazidime Ciprofloxacin Clindamycin Erythromycin Gentamicin Levofloxacin Oxacillin Piperacillin/Tazobactam Tetracycline Tobramycin   02/10/24 Tissue/Biopsy from Other (specify in comments) Proteus mirabilis S  S   S   S S  S R      Methicillin Resistant Staphylococcus aureus (MRSA)       R R   R  R      Pseudomonas aeruginosa                 02/10/24 Tissue/Biopsy from Wound/Tissue Proteus mirabilis S  S   S   S S  S R      Pseudomonas aeruginosa  S  S S S    S  S  S     Collected Specimen Info Organism Trimethoprim/Sulfamethoxazole Vancomycin   02/10/24 Tissue/Biopsy from Other (specify in comments) Proteus mirabilis S      Methicillin Resistant Staphylococcus aureus (MRSA) S S     Pseudomonas aeruginosa     02/10/24 Tissue/Biopsy from Wound/Tissue Proteus mirabilis S      Pseudomonas aeruginosa       Imaging  Transthoracic Echo (TTE) Limited    Result Date: 2/7/2024           [place] [address]            Phone [phone] TRANSTHORACIC ECHOCARDIOGRAM REPORT  Patient Name:      BASIA Gray Physician:    [id number] Littlejohn DO Study Date:        2/7/2024            Ordering Provider:    [id number] LITTLEJOHN MRN/PID:           [id number]            Fellow: Accession#:        [id number]        Nurse: Date of Birth/Age: 1950 / 73 years Sonographer:          Rena QUARLES,                                                              FABIAN CONLEY Gender:            F                   Additional Staff: Height:            157.48 cm           Admit Date: Weight:                                 Admission Status:     Inpatient - Routine BSA:               m2                  Department Location:  [facility] ICU Blood Pressure: 95 /42 mmHg Study Type:    TRANSTHORACIC ECHO (TTE) LIMITED Diagnosis/ICD: Hypotension, unspecified-I95.9; Mitral valve disorder-I05.9 Indication:    Limited to assess for MS, Hypotension CPT Codes:     Echo Limited-82498; Color Doppler-53620; Doppler Limited-44138 Patient History: Pertinent History: Hypotension, hx of MV endocarditis, PAD, DM2 ESRD on Hd. Study Detail: The following Echo studies were performed: 2D, M-Mode, Doppler and               color flow. Technically challenging study due to poor acoustic               windows and L Breast implant. Unable to obtain suprasternal notch               view.  PHYSICIAN INTERPRETATION: Left Ventricle: Left ventricular systolic function is normal. There are no regional wall motion abnormalities. The left ventricular cavity size is normal. Left ventricular diastolic filling was not assessed. Left Atrium: The left atrium is moderate to severely dilated. Right Ventricle: The right ventricle is normal in size. There is normal right ventricular global systolic function. Right Atrium: The right atrium was not well visualized. Aortic Valve: The aortic valve was not assessed. Aortic valve regurgitation was not assessed. Mitral Valve: The mitral valve is abnormal. There is evidence of mild mitral valve stenosis. The doppler estimated mean and peak diastolic pressure gradients are 4.0 mmHg and 6.6 mmHg respectively. There is severe mitral annular calcification. There is no evidence of mitral valve regurgitation. Tricuspid Valve: The tricuspid valve was not assessed. Tricuspid regurgitation was not assessed. Pulmonic Valve: The pulmonic valve is not well visualized. The pulmonic valve regurgitation was not assessed. Pericardium: There is no pericardial effusion noted. Aorta: The aortic root was not assessed.   CONCLUSIONS:  1. Left ventricular systolic function is normal.  2. The left atrium is moderate to severely dilated.  3. There is severe mitral annular calcification. QUANTITATIVE DATA SUMMARY: LV DIASTOLIC FUNCTION:                        Normal Ranges: MV Peak E:    1.23 m/s (0.7-1.2 m/s) MV Peak A:    1.12 m/s (0.42-0.7 m/s) E/A Ratio:    1.10     (1.0-2.2) MV lateral e' 0.04 m/s MV medial e'  0.07 m/s MITRAL VALVE:                      Normal Ranges: MV Vmax:    1.28 m/s (<=1.3m/s) MV peak P.6 mmHg (<5mmHg) MV mean P.0 mmHg (<48mmHg)  RIGHT VENTRICLE: RV Basal 2.97 cm  [id number] Lemus DO Electronically signed on 2024 at 3:50:21 PM  ** Final **     Transthoracic Echo (TTE) Limited    Result Date: 2024           [place] [address]            Phone [phone] TRANSTHORACIC ECHOCARDIOGRAM REPORT  Patient Name:      BASIATRAVIS Gray Physician:    [id number] Lemus DO Study Date:        2024            Ordering Provider:    [id number] REBECA RESENDEZ MRN/PID:           [id number]            Fellow: Accession#:        [id number]        Nurse: Date of Birth/Age: 1950 / 73 years Sonographer:          Jennifer WALL Gender:            F                   Additional Staff: Height:            157.48 cm           Admit Date:           2024 Weight:            98.88 kg            Admission Status:     Inpatient - Routine BSA:               1.98 m2             Department Location: Blood Pressure: 59 /49 mmHg Study Type:    TRANSTHORACIC ECHO (TTE) LIMITED Diagnosis/ICD: Acute on chronic diastolic (congestive) heart failure                (CHF)-I50.33 Indication:    Acute on chronic diastolic congestive heart failure CPT Codes:     Echo Limited-17495; Color Doppler-76324; Doppler Limited-15810 Patient History:  Pertinent History: PAF Peripheralvascular disease low blood pressure HTN Breast                    Cancer Mixed Hyperlipidemia HF CVA Hypotension DMII CAD CKD. Study Detail: The following Echo studies were performed: 2D, M-Mode, Doppler and               color flow. Technically challenging study due to body habitus,               patient lying in supine position, poor acoustic windows, prominent               lung artifact and Breast Prosthesis. Definity used as a contrast               agent for endocardial border definition. Unable to obtain               suprasternal notch view.  PHYSICIAN INTERPRETATION: Left Ventricle: Left ventricular systolic function is normal, with an estimated ejection fraction of 60-65%. There are no regional wall motion abnormalities. The left ventricular cavity size is normal. Left ventricular diastolic filling was indeterminate. Left Atrium: The left atrium is moderately dilated. Right Ventricle: The right ventricle is normal in size. There is normal right ventricular global systolic function. Right Atrium: The right atrium is normal in size. Aortic Valve: The aortic valve is trileaflet. There is no evidence of aortic valve regurgitation. The peak instantaneous gradient of the aortic valve is 5.3 mmHg. Mitral Valve: The mitral valve is normal in structure. There is severe mitral annular calcification. There is trace mitral valve regurgitation. Tricuspid Valve: The tricuspid valve is structurally normal. There is mild tricuspid regurgitation. Pulmonic Valve: The pulmonic valve is not well visualized. The pulmonic valve regurgitation was not well visualized. Pericardium: There is no pericardial effusion noted. Aorta: The aortic root is normal.  CONCLUSIONS:  1. Left ventricular systolic function is normal with a 60-65% estimated ejection fraction.  2. The left atrium is moderately dilated.  3. There is severe mitral annular calcification. QUANTITATIVE DATA SUMMARY: 2D MEASUREMENTS:                           Normal Ranges: LAs:           3.80 cm   (2.7-4.0cm) IVSd:          0.92 cm   (0.6-1.1cm) LVPWd:         1.09 cm   (0.6-1.1cm) LVIDd:         2.98 cm   (3.9-5.9cm) LVIDs:         2.12 cm LV Mass Index: 41.2 g/m2 LV % FS        28.9 % LA VOLUME:                               Normal Ranges: LA Vol A4C:        58.3 ml    (22+/-6mL/m2) LA Vol A2C:        60.9 ml LA Vol BP:         62.1 ml LA Vol Index A4C:  29.4ml/m2 LA Vol Index A2C:  30.7 ml/m2 LA Vol Index BP:   31.3 ml/m2 LA Area A4C:       20.5 cm2 LA Area A2C:       20.1 cm2 LA Major Axis A4C: 6.1 cm LA Major Axis A2C: 5.6 cm LA Volume Index:   29.3 ml/m2 LA Vol A4C:        53.1 ml LA Vol A2C:        58.4 ml LV SYSTOLIC FUNCTION BY 2D PLANIMETRY (MOD):                     Normal Ranges: EF-A4C View: 68.2 % (>=55%) EF-A2C View: 65.9 % EF-Biplane:  67.8 % AORTIC VALVE:                         Normal Ranges: AoV Vmax:      1.15 m/s (<=1.7m/s) AoV Peak P.3 mmHg (<20mmHg) LVOT Max Shimon:  1.11 m/s (<=1.1m/s) LVOT VTI:      16.20 cm LVOT Diameter: 1.90 cm  (1.8-2.4cm) AoV Area,Vmax: 2.74 cm2 (2.5-4.5cm2) PULMONIC VALVE:                         Normal Ranges: PV Accel Time: 63 msec  (>120ms) PV Max Shimon:    1.0 m/s  (0.6-0.9m/s) PV Max P.3 mmHg  [id number] Herminio Lemus DO Electronically signed on 2024 at 7:58:25 AM  ** Final **     Upper extremity venous duplex bilateral    Result Date: 2024           [address] [address]            Phone [phone]  Vascular Lab Report  VASC US UPPER EXTREMITY VENOUS DUPLEX BILATERAL Patient Name:      BASIA Gray Physician:  [id number] Mini Busby MD, RPVI Study Date:        2024           Ordering Provider:  [id number] ERI BERMAN MRN/PID:           [id number]            Fellow: Accession#:        [id number]        Technologist:       Dara Parr RVT Date of Birth/Age:  1950 / 73 years Technologist 2: Gender:            F                   Encounter#:         [id number] Admission Status:  Inpatient           Location Performed: [facility]  Diagnosis/ICD: Right arm swelling-M79.89; Left arm swelling-M79.89 CPT Codes:     92093 Peripheral venous duplex scan for DVT complete  CONCLUSIONS:  Right Upper Venous: No evidence of acute deep vein thrombus visualized in the right upper extremity. Internal jugular vein was visualized in segments due to IV lines and bandages. Left Upper Venous: No evidence of acute deep vein thrombus visualized in the left upper extremity. Subclavian stent is noted and appears patent. There is a known occluded dialysis access noted.  Additional Findings: Technically difficult exam due to IV lines, bandages, and patient's positioning.  Imaging & Doppler Findings:  Right               Compressible Thrombus        Flow Internal Jugular        Yes        None   Spontaneous/Phasic Subclavian              Yes        None Subclavian Proximal     Yes        None   Spontaneous/Phasic Subclavian Mid          Yes        None Subclavian Distal       Yes        None   Spontaneous/Phasic Axillary                Yes        None       Pulsatile Brachial                Yes        None Cephalic                Yes        None Basilic                 Yes        None  Left                Compress Thrombus        Flow Internal Jugular      Yes      None       Pulsatile Subclavian            Yes      None Subclavian Proximal   Yes      None       Pulsatile Subclavian Mid        Yes      None       Pulsatile Subclavian Distal     Yes      None       Pulsatile Axillary              Yes      None   Spontaneous/Phasic Brachial              Yes      None Cephalic              Yes      None Basilic               Yes      None  [id number] Mini Busby MD, ALICE Electronically signed by [id number] ALICE Kelly MD on 1/25/2024 at 4:06:13 PM  ** Final **      Assessment/Plan    Septic shock-on pressors  Left-sided pleural effusion   Left lower lobe pneumonia-treated  Proteus urinary tract infection-treated  History of MRSA endocarditis  History of C. 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

## 2024-02-21 NOTE — PROGRESS NOTES
Ayanna Gross is a 73 y.o. female on day 33 of admission presenting with Septic shock (CMS/HCC).  Patient with h/o Afib, ESRD, C. Diff infection, recurrent pleural effusions, and MRSA bacteremia with MV vegetation who initially p/w anemia, fever and worsening sacral and lower extremities wounds. In the ED found to be hypotensive, anemic. Received IVF, antibiotics and admitted to the ICU with the diagnosis of septic shock. Course c/b AMS, due to worsening shock, requiring Levophed. Since then patient remains hypotensive and intermittently needs Levophed. Had thoracentesis done that showed transudative effusion. Also vascular, general surgery and podiatry were consulted for pressure ulcers. Also started on Midodrine with the dose increasing to 15 mg TID. Also received several courses of antibiotics. Given stable need for Levophed, attempt made to transfer to [facility], however, denied by insurance.   Subjective   No acute overnight events. Still is requiring intermittent Vasopressors.    Currently is complaining of a cough productive of yellow sputum. No other complaints.   Objective   Scheduled medications  apixaban, 2.5 mg, oral, BID  cefepime, 1 g, intravenous, q24h  doxycylcine, 100 mg, oral, Daily  epoetin di or biosimilar, 10,000 Units, intravenous, Every Mon/Wed/Fri  escitalopram, 10 mg, oral, Nightly  fludrocortisone, 0.1 mg, oral, TID  gabapentin, 100 mg, oral, TID  heparin, 2,000 Units, intra-catheter, After Dialysis  heparin, 2,000 Units, intra-catheter, After Dialysis  heparin, 2,000 Units, intra-catheter, After Dialysis  heparin, 2,000 Units, intra-catheter, After Dialysis  insulin lispro, 0-10 Units, subcutaneous, TID with meals  ipratropium-albuteroL, 3 mL, nebulization, q6h while awake  lidocaine, 5 mL, infiltration, Once  midodrine, 20 mg, oral, TID with meals  nystatin, , Topical, BID  nystatin, , Topical, BID  pantoprazole, 40 mg, oral, Daily   Or  pantoprazole, 40 mg, intravenous, Daily  perflutren  lipid microspheres, 0.5-10 mL of dilution, intravenous, Once in imaging  perflutren protein A microsphere, 0.5 mL, intravenous, Once in imaging  psyllium, 1 packet, oral, BID  sulfur hexafluoride microsphr, 2 mL, intravenous, Once in imaging  sulfur hexafluoride microsphr, 2 mL, intravenous, Once in imaging  vancomycin, 125 mg, oral, BID  zinc oxide, 1 Application, Topical, BID    Continuous medications  norepinephrine, 0.01-3 mcg/kg/min, Last Rate: 0.02 mcg/kg/min (02/21/24 0900)    PRN medications  PRN medications: acetaminophen, albumin human, alteplase, ammonium lactate, benzocaine-menthoL, dextrose 10 % in water (D10W), dextrose, glucagon, ondansetron, oxygen, sennosides-docusate sodium, vancomycin   Physical Exam  Constitutional:       General: She is not in acute distress.     Appearance: She is normal weight. She is ill-appearing. She is not toxic-appearing.   HENT:      Head: Normocephalic and atraumatic.      Nose:      Comments: On RA     Mouth/Throat:      Mouth: Mucous membranes are moist.   Eyes:      General: No scleral icterus.     Extraocular Movements: Extraocular movements intact.      Pupils: Pupils are equal, round, and reactive to light.   Cardiovascular:      Rate and Rhythm: Regular rhythm. Tachycardia present.      Heart sounds: No murmur heard.     No friction rub. No gallop.   Pulmonary:      Effort: Pulmonary effort is normal. No respiratory distress.      Breath sounds: No wheezing or rales.   Abdominal:      General: There is no distension.      Palpations: Abdomen is soft.      Tenderness: There is no abdominal tenderness.   Musculoskeletal:      Cervical back: Normal range of motion and neck supple. No rigidity.      Comments: B/l lower extremities are wrapped. Seem to have 1+ edema.   Lymphadenopathy:      Cervical: No cervical adenopathy.   Skin:     General: Skin is warm and dry.      Coloration: Skin is pale. Skin is not jaundiced.   Neurological:      General: No focal deficit  "present.      Mental Status: She is alert and oriented to person, place, and time.   Psychiatric:         Mood and Affect: Mood normal.         Behavior: Behavior normal.     Last Recorded Vitals  Blood pressure 79/60, pulse 96, temperature 36.4 °C (97.5 °F), temperature source Axillary, resp. rate 20, height 1.575 m (5' 2\"), weight 106 kg (234 lb 2.1 oz), SpO2 97 %.  Intake/Output last 3 Shifts:  I/O last 3 completed shifts:  In: 585.9 (6.3 mL/kg) [P.O.:240; I.V.:95.9 (1 mL/kg); IV Piggyback:250]  Out: 280 (3 mL/kg) [Stool:280]  Dosing Weight: 93 kg   Relevant Results  Results for orders placed or performed during the hospital encounter of 01/19/24 (from the past 24 hour(s))   POCT GLUCOSE   Result Value Ref Range    POCT Glucose 184 (H) 74 - 99 mg/dL   POCT GLUCOSE   Result Value Ref Range    POCT Glucose 300 (H) 74 - 99 mg/dL   POCT GLUCOSE   Result Value Ref Range    POCT Glucose 175 (H) 74 - 99 mg/dL   CBC   Result Value Ref Range    WBC 14.4 (H) 4.4 - 11.3 x10*3/uL    nRBC 0.0 0.0 - 0.0 /100 WBCs    RBC 2.67 (L) 4.00 - 5.20 x10*6/uL    Hemoglobin 8.4 (L) 12.0 - 16.0 g/dL    Hematocrit 26.5 (L) 36.0 - 46.0 %    MCV 99 80 - 100 fL    MCH 31.5 26.0 - 34.0 pg    MCHC 31.7 (L) 32.0 - 36.0 g/dL    RDW 20.4 (H) 11.5 - 14.5 %    Platelets 213 150 - 450 x10*3/uL   Basic metabolic panel   Result Value Ref Range    Glucose 240 (H) 65 - 99 mg/dL    Sodium 132 (L) 133 - 145 mmol/L    Potassium 3.6 3.4 - 5.1 mmol/L    Chloride 100 97 - 107 mmol/L    Bicarbonate 18 (L) 24 - 31 mmol/L    Urea Nitrogen 37 (H) 8 - 25 mg/dL    Creatinine 3.30 (H) 0.40 - 1.60 mg/dL    eGFR 14 (L) >60 mL/min/1.73m*2    Calcium 9.0 8.5 - 10.4 mg/dL    Anion Gap 14 <=19 mmol/L   Magnesium   Result Value Ref Range    Magnesium 2.00 1.60 - 3.10 mg/dL   Vancomycin   Result Value Ref Range    Vancomycin 27.2 (H) 10.0 - 20.0 ug/mL   POCT GLUCOSE   Result Value Ref Range    POCT Glucose 220 (H) 74 - 99 mg/dL   No results found.   Assessment/Plan "   Principal Problem:    Septic shock (CMS/[facility])  Active Problems:    Pneumonia    Low blood pressure    End stage renal disease (CMS/[facility])    Anemia due to blood loss, acute  73 with h/o Afib, ESRD, C. Diff infection, recurrent pleural effusions, and MRSA bacteremia with MV vegetation who initially p/w anemia, fever and worsening sacral and lower extremities wounds. In the ED found to be hypotensive, anemic. Received IVF, antibiotics and admitted to the ICU with the diagnosis of septic shock. Course c/b AMS, due to worsening shock, requiring Levophed. Since then patient remains hypotensive and intermittently needs Levophed. Had thoracentesis done that showed transudative effusion. Also vascular, general surgery and podiatry were consulted for pressure ulcers. Also started on Midodrine with the dose increasing to 15 mg TID. Also received several courses of antibiotics. Given stable need for Levophed, attempt made to transfer to [facility], however, denied by insurance.     Neuro: intermittent episodes of delirium/confusion      Maintain MAP      Continue gabapentin      Continue Lexapro      Supportive measures.   CV: patient in persistent shock, unclear etiology, normal EF, but possible diastolic dysfunction. Echo consistent with left sided volume overload. Afib, rate acceptable but not full controlled      Continue Apixaban      Continue Levophed, requiring it intermittently      Continue Midodorine      Continue to monitor       Cardiology consult done, input appreciated.     Pulmonary: no acute issues.       Supplemental O2 as needed      Continue Duo-Neb    Renal: ESRD on HD      Nephrology on consult, will FU with their recommendations      HD as per nephrology      Daily RFP      Treat electrolyte abnormalities as indicated    GI: C. Diff infection, otherwise no acute issues      GI prophylaxis      Continue diet    Endo: concern for adrenal insufficiency, T2DM. BS at times elevated      Continue fludrocortisone       Continue SSI      Hypoglycemic protocol        Hem/Onc: chronic anemia, no acute bleed      Continue to monitor with daily CBC    ID: persistent leukocytosis, concerning for ongoing infection. H/o infective endocarditis. Also wound infections      ID on consult, input appreciated      Continue Cefepime, Doxycycline and IV vancomycin      Oral vancomycin for C. Diff.     PT/OT.   This was a critical care visit for shock. Total time 43 min.   Charly Grossman MD

## 2024-02-21 NOTE — CARE PLAN
The patient's goals for the shift include Visit with family.    The clinical goals for the shift include Maintain hemodynamic stability and wean off of pressor support    Problem: Skin  Goal: Decreased wound size/increased tissue granulation at next dressing change  Flowsheets (Taken 2/21/2024 0019)  Decreased wound size/increased tissue granulation at next dressing change:   Promote sleep for wound healing   Protective dressings over bony prominences   Utilize specialty bed per algorithm  Goal: Participates in plan/prevention/treatment measures  Flowsheets (Taken 2/21/2024 0019)  Participates in plan/prevention/treatment measures:   Discuss with provider PT/OT consult   Elevate heels   Increase activity/out of bed for meals  Goal: Prevent/manage excess moisture  Flowsheets (Taken 2/21/2024 0019)  Prevent/manage excess moisture:   Cleanse incontinence/protect with barrier cream   Monitor for/manage infection if present   Follow provider orders for dressing changes   Moisturize dry skin  Goal: Prevent/minimize sheer/friction injuries  Flowsheets (Taken 2/21/2024 0019)  Prevent/minimize sheer/friction injuries:   Complete micro-shifts as needed if patient unable. Adjust patient position to relieve pressure points, not a full turn   Increase activity/out of bed for meals   Use pull sheet   HOB 30 degrees or less   Turn/reposition every 2 hours/use positioning/transfer devices   Utilize specialty bed per algorithm  Goal: Promote/optimize nutrition  Flowsheets (Taken 2/21/2024 0019)  Promote/optimize nutrition:   Assist with feeding   Monitor/record intake including meals   Consume > 50% meals/supplements   Offer water/supplements/favorite foods  Goal: Promote skin healing  Flowsheets (Taken 2/21/2024 0019)  Promote skin healing:   Assess skin/pad under line(s)/device(s)   Protective dressings over bony prominences   Turn/reposition every 2 hours/use positioning/transfer devices   Ensure correct size (line/device) and  apply per  instructions   Rotate device position/do not position patient on device

## 2024-02-22 NOTE — PROGRESS NOTES
Ayanna Gross is a 73 y.o. female on day 34 of admission presenting with Septic shock (CMS/HCC).    Subjective   Interval History:   Patient seen and examined  Awake, on pressors  Afebrile      Review of Systems   All other systems reviewed and are negative.      Objective   Range of Vitals (last 24 hours)  Heart Rate:  []   Temp:  [36.5 °C (97.7 °F)-37.3 °C (99.1 °F)]   Resp:  [15-43]   BP: (43-92)/(19-70)   Weight:  [97.3 kg (214 lb 8.1 oz)]   SpO2:  [78 %-100 %]   Daily Weight  02/22/24 : 97.3 kg (214 lb 8.1 oz)    Body mass index is 39.23 kg/m².    Physical Exam  Constitutional:       Appearance: Awake, alert  HENT:      Head: Normocephalic and atraumatic.      Nose: Nose normal.   Eyes:      Extraocular Movements: Extraocular movements intact.      Conjunctiva/sclera: Conjunctivae normal.   Cardiovascular:      Heart sounds: Normal heart sounds, S1 normal and S2 normal.   Pulmonary:      Breath sounds: Decreased breath sounds present.   Abdominal:      General: Bowel sounds are normal.      Palpations: Abdomen is soft.   Musculoskeletal:      Cervical back: Normal range of motion and neck supple.   Skin:     Comments: Bilateral posterior heel eschar -photos examined  Neurological:      Mental Status: She is awake, alert    Antibiotics  aspirin tablet 325 mg  acetaminophen (Tylenol) tablet 650 mg  cefepime (Maxipime) 1 g in dextrose 5 % 50 mL IV  sodium chloride 0.9 % bolus 2,229 mL  apixaban (Eliquis) tablet 2.5 mg  escitalopram (Lexapro) tablet 10 mg  febuxostat (Uloric) tablet 40 mg  fenofibrate (Triglide) tablet 160 mg  gabapentin (Neurontin) capsule 100 mg  midodrine (Proamatine) tablet 15 mg  nystatin (Mycostatin) 100,000 unit/gram powder  oxyCODONE-acetaminophen (Percocet) 5-325 mg per tablet 1 tablet  simvastatin (Zocor) tablet 20 mg  piperacillin-tazobactam-dextrose (Zosyn) IV 2.25 g  vancomycin (Vancocin) capsule 125 mg  oxygen (O2) therapy  dextrose 50 % injection 25 g  glucagon (Glucagen)  injection 1 mg  dextrose 10 % in water (D10W) infusion  insulin lispro (HumaLOG) injection 0-10 Units  nystatin (Mycostatin) 100,000 unit/gram powder 1 Application  vancomycin-diluent combo no.1 (Xellia) IVPB 1,750 mg  piperacillin-tazobactam-dextrose (Zosyn) IV 2.25 g  sodium chloride 0.9 % bolus 500 mL  norepinephrine (Levophed) 8 mg in dextrose 5% 250 mL (0.032 mg/mL) infusion (premix)  vancomycin (Vancocin) placeholder  pantoprazole (ProtoNix) EC tablet 40 mg  pantoprazole (ProtoNix) injection 40 mg  sennosides-docusate sodium (Judy-Colace) 8.6-50 mg per tablet 1 tablet  doxycycline (Vibramycin) in dextrose 5 % in water (D5W) 100 mL  mg  LORazepam (Ativan) injection 2 mg  magnesium sulfate IV 2 g  zinc oxide 20 % ointment 1 Application  nystatin (Mycostatin) cream  norepinephrine (Levophed) 8 mg in dextrose 5% 250 mL (0.032 mg/mL) infusion (premix)  heparin 1,000 unit/mL injection 2,000 Units  heparin 1,000 unit/mL injection 2,000 Units  nystatin (Mycostatin) ointment  acetaminophen (Tylenol) oral liquid 1,000 mg  albumin human 25 % solution 12.5 g  perflutren lipid microspheres (Definity) injection 0.5-10 mL of dilution  sulfur hexafluoride microsphr (Lumason) injection 24.28 mg  perflutren protein A microsphere (Optison) injection 0.5 mL  ipratropium-albuteroL (Duo-Neb) 0.5-2.5 mg/3 mL nebulizer solution 3 mL  sodium chloride 3 % nebulizer solution 3 mL  vancomycin-diluent combo no.1 (Xellia) IVPB 750 mg  sennosides-docusate sodium (Judy-Colace) 8.6-50 mg per tablet 1 tablet  acetaminophen (Tylenol) tablet 650 mg  doxycycline (Vibramycin) capsule 100 mg  magnesium oxide (Mag-Ox) tablet 400 mg  vasopressin (Vasostrict) 0.2 unit/mL infusion  norepinephrine (Levophed) 8 mg in dextrose 5% 250 mL (0.032 mg/mL) infusion (premix)  heparin 1,000 unit/mL injection 2,000 Units  heparin 1,000 unit/mL injection 2,000 Units  albumin human 25 % solution 12.5 g  vancomycin (Xellia) 1 g in 200 mL (Xellia) IVPB 1  g  ipratropium-albuteroL (Duo-Neb) 0.5-2.5 mg/3 mL nebulizer solution 3 mL  sodium chloride 3 % nebulizer solution 3 mL  albumin human 5 % infusion 12.5 g  heparin 1,000 unit/mL injection 2,000 Units  heparin 1,000 unit/mL injection 2,000 Units  vancomycin (Vancocin) capsule 125 mg  albumin human 25 % solution 12.5 g  heparin 1,000 unit/mL injection 2,000 Units  heparin 1,000 unit/mL injection 2,000 Units  ipratropium-albuteroL (Duo-Neb) 0.5-2.5 mg/3 mL nebulizer solution 3 mL  lidocaine (Xylocaine) 10 mg/mL (1 %) injection 50 mg  sodium phosphate 15 mmol in sodium chloride 0.9% 250 mL IV  sod phos di, mono-K phos mono (K Phos Neutral) tablet 250 mg  potassium, sodium phosphates (Phos-NaK) 280-160-250 mg packet 1 packet  doxycycline (Vibramycin) capsule 100 mg  fludrocortisone (Florinef) tablet 0.1 mg  gabapentin (Neurontin) capsule 100 mg  acetaminophen (Tylenol) tablet 1,000 mg  benzocaine-menthoL (Dermoplast) topical spray  vancomycin (Vancocin) capsule 125 mg  heparin 1,000 unit/mL injection 2,000 Units  heparin 1,000 unit/mL injection 2,000 Units  magnesium sulfate IV 2 g  albumin human 25 % solution 12.5 g  promethazine (Phenergan) injection 12.5 mg  ondansetron (Zofran) injection 4 mg  lidocaine (Xylocaine) 10 mg/mL (1 %) injection 50 mg  alteplase (Cathflo Activase) injection 2 mg  acetaminophen (Tylenol) tablet 650 mg  psyllium (Metamucil) 3.4 gram packet 1 packet  perflutren lipid microspheres (Definity) injection 0.5-10 mL of dilution  sulfur hexafluoride microsphr (Lumason) injection 24.28 mg  perflutren protein A microsphere (Optison) injection 0.5 mL  epoetin di-epbx (Retacrit) injection 10,000 Units  sulfur hexafluoride microsphr (Lumason) injection 24.28 mg  perflutren lipid microspheres (Definity) injection 0.5-10 mL of dilution  perflutren lipid microspheres (Definity) injection 0.5-10 mL of dilution  sulfur hexafluoride microsphr (Lumason) injection 24.28 mg  perflutren protein A microsphere  (Optison) injection 0.5 mL  ammonium lactate (Lac-Hydrin) 12 % lotion 1 Application  norepinephrine (Levophed) 8 mg in dextrose 5% 250 mL (0.032 mg/mL) infusion (premix)  fludrocortisone (Florinef) tablet 0.1 mg  albumin human 25 % solution 25 g  albumin human 25 % solution 25 g  meropenem (Merrem) 500 mg in sodium chloride 0.9 % 100 mL IV  potassium chloride 20 mEq in 100 mL IV premix  potassium chloride 20 mEq in 100 mL IV premix  magnesium sulfate IV 2 g  cefepime (Maxipime) 1 g in dextrose 5 % 50 mL IV  vancomycin-diluent combo no.1 (Xellia) IVPB 750 mg  albumin human 25 % solution 12.5 g  vancomycin (Xellia) 1 g in 200 mL (Xellia) IVPB 1 g  vancomycin (Vancocin) placeholder  vancomycin (Xellia) 1 g in 200 mL (Xellia) IVPB 1 g  midodrine (Proamatine) tablet 20 mg  vancomycin-diluent combo no.1 (Xellia) IVPB 750 mg  magnesium sulfate IV 2 g  heparin flush 100 unit/mL syringe 160 Units  heparin flush 100 unit/mL syringe 160 Units      Relevant Results  Labs  Results from last 72 hours   Lab Units 02/22/24  0457 02/21/24  0502 02/20/24  0428   WBC AUTO x10*3/uL 14.9* 14.4* 12.3*   HEMOGLOBIN g/dL 9.3* 8.4* 8.3*   HEMATOCRIT % 30.0* 26.5* 26.4*   PLATELETS AUTO x10*3/uL 149* 213 181     Results from last 72 hours   Lab Units 02/22/24  0457 02/21/24  0502 02/20/24  0428   SODIUM mmol/L 132* 132* 132*   POTASSIUM mmol/L 3.8 3.6 3.7   CHLORIDE mmol/L 100 100 99   CO2 mmol/L 20* 18* 20*   BUN mg/dL 28* 37* 27*   CREATININE mg/dL 2.70* 3.30* 2.60*   GLUCOSE mg/dL 256* 240* 179*   CALCIUM mg/dL 9.0 9.0 8.2*   ANION GAP mmol/L 12 14 13   EGFR mL/min/1.73m*2 18* 14* 19*         Estimated Creatinine Clearance: 20.8 mL/min (A) (by C-G formula based on SCr of 2.7 mg/dL (H)).  C-Reactive Protein   Date Value Ref Range Status   12/25/2023 5.20 (H) 0.00 - 2.00 mg/dL Final     CRP   Date Value Ref Range Status   06/23/2023 19.9 (H) 0 - 2.0 MG/DL Final     Comment:     Performed at [address] [address]    05/22/2022 1.0 0 - 2.0 MG/DL Final     Comment:     Performed at [place] [address]     Microbiology  Susceptibility data from last 14 days.  Collected Specimen Info Organism Ampicillin Aztreonam Cefazolin Cefepime Ceftazidime Ciprofloxacin Clindamycin Erythromycin Gentamicin Levofloxacin Oxacillin Piperacillin/Tazobactam Tetracycline Tobramycin   02/10/24 Tissue/Biopsy from Other (specify in comments) Proteus mirabilis S  S   S   S S  S R      Methicillin Resistant Staphylococcus aureus (MRSA)       R R   R  R      Pseudomonas aeruginosa                 02/10/24 Tissue/Biopsy from Wound/Tissue Proteus mirabilis S  S   S   S S  S R      Pseudomonas aeruginosa  S  S S S    S  S  S     Collected Specimen Info Organism Trimethoprim/Sulfamethoxazole Vancomycin   02/10/24 Tissue/Biopsy from Other (specify in comments) Proteus mirabilis S      Methicillin Resistant Staphylococcus aureus (MRSA) S S     Pseudomonas aeruginosa     02/10/24 Tissue/Biopsy from Wound/Tissue Proteus mirabilis S      Pseudomonas aeruginosa     Reviewed  Imaging  Transthoracic Echo (TTE) Limited    Result Date: 2/7/2024           [place] [address]            Phone [phone] TRANSTHORACIC ECHOCARDIOGRAM REPORT  Patient Name:      BASIA Gray Physician:    [id number] Littlejohn DO Study Date:        2/7/2024            Ordering Provider:    Shirley LITTLEJOHN MRN/PID:           [id number]            Fellow: Accession#:        [id number]        Nurse: Date of Birth/Age: 1950 / 73 years Sonographer:          Rena Talavera ACS,                                                              RDCS, FABIAN Gender:            F                   Additional Staff: Height:             157.48 cm           Admit Date: Weight:                                Admission Status:     Inpatient - Routine BSA:               m2                  Department Location:  [facility] ICU Blood Pressure: 95 /42 mmHg Study Type:    TRANSTHORACIC ECHO (TTE) LIMITED Diagnosis/ICD: Hypotension, unspecified-I95.9; Mitral valve disorder-I05.9 Indication:    Limited to assess for MS, Hypotension CPT Codes:     Echo Limited-08337; Color Doppler-60367; Doppler Limited-52442 Patient History: Pertinent History: Hypotension, hx of MV endocarditis, PAD, DM2 ESRD on Hd. Study Detail: The following Echo studies were performed: 2D, M-Mode, Doppler and               color flow. Technically challenging study due to poor acoustic               windows and L Breast implant. Unable to obtain suprasternal notch               view.  PHYSICIAN INTERPRETATION: Left Ventricle: Left ventricular systolic function is normal. There are no regional wall motion abnormalities. The left ventricular cavity size is normal. Left ventricular diastolic filling was not assessed. Left Atrium: The left atrium is moderate to severely dilated. Right Ventricle: The right ventricle is normal in size. There is normal right ventricular global systolic function. Right Atrium: The right atrium was not well visualized. Aortic Valve: The aortic valve was not assessed. Aortic valve regurgitation was not assessed. Mitral Valve: The mitral valve is abnormal. There is evidence of mild mitral valve stenosis. The doppler estimated mean and peak diastolic pressure gradients are 4.0 mmHg and 6.6 mmHg respectively. There is severe mitral annular calcification. There is no evidence of mitral valve regurgitation. Tricuspid Valve: The tricuspid valve was not assessed. Tricuspid regurgitation was not assessed. Pulmonic Valve: The pulmonic valve is not well visualized. The pulmonic valve regurgitation was not assessed. Pericardium: There is no pericardial effusion noted.  Aorta: The aortic root was not assessed.  CONCLUSIONS:  1. Left ventricular systolic function is normal.  2. The left atrium is moderate to severely dilated.  3. There is severe mitral annular calcification. QUANTITATIVE DATA SUMMARY: LV DIASTOLIC FUNCTION:                        Normal Ranges: MV Peak E:    1.23 m/s (0.7-1.2 m/s) MV Peak A:    1.12 m/s (0.42-0.7 m/s) E/A Ratio:    1.10     (1.0-2.2) MV lateral e' 0.04 m/s MV medial e'  0.07 m/s MITRAL VALVE:                      Normal Ranges: MV Vmax:    1.28 m/s (<=1.3m/s) MV peak P.6 mmHg (<5mmHg) MV mean P.0 mmHg (<48mmHg)  RIGHT VENTRICLE: RV Basal 2.97 cm  [id number] Lemus DO Electronically signed on 2024 at 3:50:21 PM  ** Final **     Transthoracic Echo (TTE) Limited    Result Date: 2024           [place] [address]            Phone [phone] TRANSTHORACIC ECHOCARDIOGRAM REPORT  Patient Name:      BASIA Gray Physician:    [id number] Lemus DO Study Date:        2024            Ordering Provider:    [id number] REBECA RESENDEZ MRN/PID:           [id number]            Fellow: Accession#:        [id number]        Nurse: Date of Birth/Age: 1950 / 73 years Sonographer:          Jennifer WALL Gender:            F                   Additional Staff: Height:            157.48 cm           Admit Date:           2024 Weight:            98.88 kg            Admission Status:     Inpatient - Routine BSA:               1.98 m2             Department Location: Blood Pressure: 59 /49 mmHg Study Type:    TRANSTHORACIC ECHO (TTE) LIMITED Diagnosis/ICD: Acute on chronic diastolic (congestive) heart failure                (CHF)-I50.33 Indication:    Acute on chronic diastolic congestive heart failure CPT Codes:     Echo Limited-46688; Color Doppler-76149;  Doppler Limited-73480 Patient History: Pertinent History: PAF Peripheralvascular disease low blood pressure HTN Breast                    Cancer Mixed Hyperlipidemia HF CVA Hypotension DMII CAD CKD. Study Detail: The following Echo studies were performed: 2D, M-Mode, Doppler and               color flow. Technically challenging study due to body habitus,               patient lying in supine position, poor acoustic windows, prominent               lung artifact and Breast Prosthesis. Definity used as a contrast               agent for endocardial border definition. Unable to obtain               suprasternal notch view.  PHYSICIAN INTERPRETATION: Left Ventricle: Left ventricular systolic function is normal, with an estimated ejection fraction of 60-65%. There are no regional wall motion abnormalities. The left ventricular cavity size is normal. Left ventricular diastolic filling was indeterminate. Left Atrium: The left atrium is moderately dilated. Right Ventricle: The right ventricle is normal in size. There is normal right ventricular global systolic function. Right Atrium: The right atrium is normal in size. Aortic Valve: The aortic valve is trileaflet. There is no evidence of aortic valve regurgitation. The peak instantaneous gradient of the aortic valve is 5.3 mmHg. Mitral Valve: The mitral valve is normal in structure. There is severe mitral annular calcification. There is trace mitral valve regurgitation. Tricuspid Valve: The tricuspid valve is structurally normal. There is mild tricuspid regurgitation. Pulmonic Valve: The pulmonic valve is not well visualized. The pulmonic valve regurgitation was not well visualized. Pericardium: There is no pericardial effusion noted. Aorta: The aortic root is normal.  CONCLUSIONS:  1. Left ventricular systolic function is normal with a 60-65% estimated ejection fraction.  2. The left atrium is moderately dilated.  3. There is severe mitral annular calcification.  QUANTITATIVE DATA SUMMARY: 2D MEASUREMENTS:                          Normal Ranges: LAs:           3.80 cm   (2.7-4.0cm) IVSd:          0.92 cm   (0.6-1.1cm) LVPWd:         1.09 cm   (0.6-1.1cm) LVIDd:         2.98 cm   (3.9-5.9cm) LVIDs:         2.12 cm LV Mass Index: 41.2 g/m2 LV % FS        28.9 % LA VOLUME:                               Normal Ranges: LA Vol A4C:        58.3 ml    (22+/-6mL/m2) LA Vol A2C:        60.9 ml LA Vol BP:         62.1 ml LA Vol Index A4C:  29.4ml/m2 LA Vol Index A2C:  30.7 ml/m2 LA Vol Index BP:   31.3 ml/m2 LA Area A4C:       20.5 cm2 LA Area A2C:       20.1 cm2 LA Major Axis A4C: 6.1 cm LA Major Axis A2C: 5.6 cm LA Volume Index:   29.3 ml/m2 LA Vol A4C:        53.1 ml LA Vol A2C:        58.4 ml LV SYSTOLIC FUNCTION BY 2D PLANIMETRY (MOD):                     Normal Ranges: EF-A4C View: 68.2 % (>=55%) EF-A2C View: 65.9 % EF-Biplane:  67.8 % AORTIC VALVE:                         Normal Ranges: AoV Vmax:      1.15 m/s (<=1.7m/s) AoV Peak P.3 mmHg (<20mmHg) LVOT Max Shimon:  1.11 m/s (<=1.1m/s) LVOT VTI:      16.20 cm LVOT Diameter: 1.90 cm  (1.8-2.4cm) AoV Area,Vmax: 2.74 cm2 (2.5-4.5cm2) PULMONIC VALVE:                         Normal Ranges: PV Accel Time: 63 msec  (>120ms) PV Max Shimon:    1.0 m/s  (0.6-0.9m/s) PV Max P.3 mmHg  [id number] Herminio Lemus DO Electronically signed on 2024 at 7:58:25 AM  ** Final **     Upper extremity venous duplex bilateral    Result Date: 2024           [address] [place] [address]            Phone [phone]  Vascular Lab Report  [facility] US UPPER EXTREMITY VENOUS DUPLEX BILATERAL Patient Name:      BASIA Gray Physician:  [id number] Mini Busby MD, RPVI Study Date:        2024           Ordering Provider:  [id number] ERI BERMAN MRN/PID:           [id number]            Fellow: Accession#:        [id number]        Technologist:        Dara Parr [facility] Date of Birth/Age: 1950 / 73 years Technologist 2: Gender:            F                   Encounter#:         [id number] Admission Status:  Inpatient           Location Performed: [facility]  Diagnosis/ICD: Right arm swelling-M79.89; Left arm swelling-M79.89 CPT Codes:     75100 Peripheral venous duplex scan for DVT complete  CONCLUSIONS:  Right Upper Venous: No evidence of acute deep vein thrombus visualized in the right upper extremity. Internal jugular vein was visualized in segments due to IV lines and bandages. Left Upper Venous: No evidence of acute deep vein thrombus visualized in the left upper extremity. Subclavian stent is noted and appears patent. There is a known occluded dialysis access noted.  Additional Findings: Technically difficult exam due to IV lines, bandages, and patient's positioning.  Imaging & Doppler Findings:  Right               Compressible Thrombus        Flow Internal Jugular        Yes        None   Spontaneous/Phasic Subclavian              Yes        None Subclavian Proximal     Yes        None   Spontaneous/Phasic Subclavian Mid          Yes        None Subclavian Distal       Yes        None   Spontaneous/Phasic Axillary                Yes        None       Pulsatile Brachial                Yes        None Cephalic                Yes        None Basilic                 Yes        None  Left                Compress Thrombus        Flow Internal Jugular      Yes      None       Pulsatile Subclavian            Yes      None Subclavian Proximal   Yes      None       Pulsatile Subclavian Mid        Yes      None       Pulsatile Subclavian Distal     Yes      None       Pulsatile Axillary              Yes      None   Spontaneous/Phasic Brachial              Yes      None Cephalic              Yes      None Basilic               Yes      None  [id number] Mini Busby MD, ALICE Electronically signed by [id number] ALICE Kelly MD on 1/25/2024 at 4:06:13  PM  ** Final **      Assessment/Plan   Septic shock-patient on pressors  Left-sided pleural effusion   Left lower lobe pneumonia-treated  Proteus urinary tract infection-treated  History of MRSA endocarditis  History of C. 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

## 2024-02-22 NOTE — NURSING NOTE
Pt's hair washed and styled, pt's lipstick applied per her request. Positioned for comfort. No other needs at this time

## 2024-02-22 NOTE — PROGRESS NOTES
"Nutrition Follow up Note    Nutrition Assessment      Patient states she is eating \"a little\". Continues to decline nutritional supplements. RN states no plans for discharge at this time.    Nutrition History:     Energy Intake: Poor < 50 %  Food Allergies/Intolerances:  None  GI Symptoms: None  Oral Problems: Oral aversion    Anthropometrics:  Ht: 157.5 cm (5' 2\"), Wt: 97.3 kg (214 lb 8.1 oz), BMI: 39.99  IBW/kg (Dietitian Calculated): 50 kg  Percent of IBW: 151.6 %  Adjusted Body Weight (kg): 56.36 kg    Weight Change:  Daily Weight  02/22/24 : 97.3 kg (214 lb 8.1 oz)  01/05/24 : 72.8 kg (160 lb 7.9 oz)  11/19/23 : 72.6 kg (160 lb)  01/19/23 : 80.2 kg (176 lb 12.9 oz)  11/07/22 : 86.2 kg (190 lb)  07/21/22 : 80.2 kg (176 lb 12.9 oz)  05/26/22 : 77.1 kg (170 lb)  04/04/22 : 86.2 kg (190 lb)  06/29/21 : 87.9 kg (193 lb 12.6 oz)  05/11/21 : 81.6 kg (180 lb)     Nutrition Focused Physical Exam Findings:      Edema  Edema: +3 moderate, +2 mild  Edema Location: BLE, BUE    Physical Findings (Nutrition Deficiency/Toxicity)  Skin: Positive (R tibia skin tear, L toe pressure injury, bilateral buttock pressure injuries, L ankle DM ulcer, bilateral heel DM ulcers)    Nutrition Significant Labs:  Lab Results   Component Value Date    WBC 14.9 (H) 02/22/2024    HGB 9.3 (L) 02/22/2024    HCT 30.0 (L) 02/22/2024     (L) 02/22/2024    CHOL 104 (L) 07/01/2023    TRIG 155 (H) 07/01/2023    HDL 30 (L) 07/01/2023    ALT 6 02/13/2024    AST 14 02/13/2024     (L) 02/22/2024    K 3.8 02/22/2024     02/22/2024    CREATININE 2.70 (H) 02/22/2024    BUN 28 (H) 02/22/2024    CO2 20 (L) 02/22/2024    TSH 6.10 (H) 01/24/2024    INR 1.3 (H) 01/19/2024    HGBA1C 8.8 (H) 06/25/2023    ALBUR 1,315 (H) 02/24/2019       Current Facility-Administered Medications:     acetaminophen (Tylenol) tablet 650 mg, 650 mg, oral, q4h PRN, Ronald Casey, MADHURI-CNP, 650 mg at 02/12/24 2056    albumin human 25 % solution 12.5 g, 12.5 g, " intravenous, q1h PRN, Saeed Mondragon MD, Stopped at 02/14/24 1817    alteplase (Cathflo Activase) injection 2 mg, 2 mg, intra-catheter, PRN, LILLIE Mccray    ammonium lactate (Lac-Hydrin) 12 % lotion 1 Application, 1 Application, Topical, q1h PRN, Minh Shane MD, 1 Application at 02/15/24 0945    apixaban (Eliquis) tablet 2.5 mg, 2.5 mg, oral, BID, Judd Tavera DO, 2.5 mg at 02/22/24 1006    benzocaine-menthoL (Dermoplast) topical spray, , Topical, 4x daily PRN, LILLIE Diaz    cefepime (Maxipime) 1 g in dextrose 5 % 50 mL IV, 1 g, intravenous, q24h, Stephen Coulter MD, Stopped at 02/21/24 2230    dextrose 10 % in water (D10W) infusion, 0.3 g/kg/hr, intravenous, Once PRN, Judd Tavera DO    dextrose 50 % injection 25 g, 25 g, intravenous, q15 min PRN, Judd Tavera DO    doxycycline (Vibramycin) capsule 100 mg, 100 mg, oral, Daily, Judd Barreto MD, 100 mg at 02/21/24 1236    epoetin di-epbx (Retacrit) injection 10,000 Units, 10,000 Units, intravenous, Every Mon/Wed/Fri, Saeed Mondragon MD, 10,000 Units at 02/21/24 1234    escitalopram (Lexapro) tablet 10 mg, 10 mg, oral, Nightly, Judd Tavera DO, 10 mg at 02/21/24 2031    fludrocortisone (Florinef) tablet 0.1 mg, 0.1 mg, oral, TID, Yusuf De La Garza PA-C, 0.1 mg at 02/22/24 1006    gabapentin (Neurontin) capsule 100 mg, 100 mg, oral, TID, LILLIE Diaz, 100 mg at 02/22/24 1005    glucagon (Glucagen) injection 1 mg, 1 mg, intramuscular, q15 min PRN, Judd Tavera DO    heparin 1,000 unit/mL injection 2,000 Units, 2,000 Units, intra-catheter, After Dialysis, Saeed Mondragon MD, 2,000 Units at 02/16/24 1245    heparin 1,000 unit/mL injection 2,000 Units, 2,000 Units, intra-catheter, After Dialysis, Saeed Mondragon MD, 2,000 Units at 02/16/24 1753    heparin 1,000 unit/mL injection 2,000 Units, 2,000 Units, intra-catheter, After Dialysis, Saeed CASTRO  MD Giancarlo, 1,600 Units at 02/21/24 1331    heparin 1,000 unit/mL injection 2,000 Units, 2,000 Units, intra-catheter, After Dialysis, Saeed Mondragon MD, 1,600 Units at 02/21/24 1330    heparin flush 100 unit/mL syringe 160 Units, 1.6 mL, intra-catheter, PRN, Charly Grossman MD    heparin flush 100 unit/mL syringe 160 Units, 1.6 mL, intra-catheter, PRN, Charly Grossman MD    insulin lispro (HumaLOG) injection 0-10 Units, 0-10 Units, subcutaneous, TID with meals, Judd Tavera DO, 4 Units at 02/22/24 1007    ipratropium-albuteroL (Duo-Neb) 0.5-2.5 mg/3 mL nebulizer solution 3 mL, 3 mL, nebulization, q6h while awake, Judd Barreto MD, 3 mL at 02/22/24 0709    lidocaine (Xylocaine) 10 mg/mL (1 %) injection 50 mg, 5 mL, infiltration, Once, LILLIE Mccray    midodrine (Proamatine) tablet 20 mg, 20 mg, oral, TID with meals, Joshua Hernandez PA-C, 20 mg at 02/22/24 1006    norepinephrine (Levophed) 8 mg in dextrose 5% 250 mL (0.032 mg/mL) infusion (premix), 0.01-3 mcg/kg/min, intravenous, Continuous, Yusuf De La Garza PA-C, Last Rate: 11.15 mL/hr at 02/22/24 0700, 0.08 mcg/kg/min at 02/22/24 0700    nystatin (Mycostatin) 100,000 unit/gram powder, , Topical, BID, Judd Tavera DO, Given at 02/22/24 1034    nystatin (Mycostatin) cream, , Topical, BID, LILLIE Blair, Given at 02/22/24 1034    ondansetron (Zofran) injection 4 mg, 4 mg, intravenous, q6h PRN, LILLIE Mccray, 4 mg at 02/03/24 0521    oxygen (O2) therapy, , inhalation, Continuous PRN - O2/gases, Judd Tavera DO, 2 L/min at 02/21/24 0815    pantoprazole (ProtoNix) EC tablet 40 mg, 40 mg, oral, Daily, 40 mg at 02/22/24 1006 **OR** pantoprazole (ProtoNix) injection 40 mg, 40 mg, intravenous, Daily, Yusuf De La Garza PA-C, 40 mg at 02/07/24 0859    perflutren lipid microspheres (Definity) injection 0.5-10 mL of dilution, 0.5-10 mL of dilution, intravenous, Once in imaging, Minh Shane MD     perflutren protein A microsphere (Optison) injection 0.5 mL, 0.5 mL, intravenous, Once in imaging, Minh Shane MD    psyllium (Metamucil) 3.4 gram packet 1 packet, 1 packet, oral, BID, Ronald PINO Carmela, APRN-CNP, 1 packet at 02/15/24 2051    sennosides-docusate sodium (Judy-Colace) 8.6-50 mg per tablet 1 tablet, 1 tablet, oral, Nightly PRN, Yusuf De La Garza PA-C    sulfur hexafluoride microsphr (Lumason) injection 24.28 mg, 2 mL, intravenous, Once in imaging, Minh Shane MD    sulfur hexafluoride microsphr (Lumason) injection 24.28 mg, 2 mL, intravenous, Once in imaging, Ángel Hardy, APRN-CNP    vancomycin (Vancocin) capsule 125 mg, 125 mg, oral, BID, Iraida Lehman, APRN-CNP, 125 mg at 02/22/24 1031    vancomycin (Vancocin) placeholder, , miscellaneous, Daily PRN, Kendall Alfonso, PharmD    zinc oxide 20 % ointment 1 Application, 1 Application, Topical, BID, Nickolas Gallego, APRN-CNP, 1 Application at 02/22/24 1034      Dietary Orders (From admission, onward)       Start     Ordered    02/07/24 1054  Adult diet Regular  Diet effective now        Question:  Diet type  Answer:  Regular    02/07/24 1053                   Estimated Needs:   Estimated Energy Needs  Total Energy Estimated Needs (kCal):  (6650-1584)  Total Estimated Energy Need per Day (kCal/kg):  (30-35)  Method for Estimating Needs: ABW    Estimated Protein Needs  Total Protein Estimated Needs (g):  (68-85)  Total Protein Estimated Needs (g/kg):  (1.2-1.5)  Method for Estimating Needs: ABW    Estimated Fluid Needs  Total Fluid Estimated Needs (mL):  (UO + 500 mL)  Method for Estimating Needs: HD and multiple wounds      Nutrition Diagnosis   Nutrition Diagnosis:  Malnutrition Diagnosis  Diagnosis Status: Ongoing    Nutrition Diagnosis  Patient has Nutrition Diagnosis: Yes  Diagnosis Status (1): Ongoing  Nutrition Diagnosis 1: Increased nutrient needs  Related to (1): Increased demand for nutrient  As Evidenced by (1):  HD     Nutrition Interventions/Recommendations   Nutrition Interventions and Recommendations:    Nutrition Prescription:  Individualized Nutrition Prescription Provided for : 3079-0609 kcals, 68-85 gm protein to be provided via diet    Nutrition Interventions:   Food and/or Nutrient Delivery Interventions  Interventions: Meals and snacks  Meals and Snacks: General healthful diet  Goal: Provide as ordered    Education Documentation  No documentation found.         Nutrition Monitoring and Evaluation   Monitoring/Evaluation:   Food/Nutrient Related History Monitoring  Monitoring and Evaluation Plan: Energy intake  Energy Intake: Estimated energy intake  Criteria: Pt to consume >/= 75% of estimated needs       Time Spent/Follow-up:   Follow Up  Time Spent (min): 15 minutes  Last Date of Nutrition Visit: 02/22/24  Nutrition Follow-Up Needed?: 5-7 days  Follow up Comment: 2/27/24

## 2024-02-22 NOTE — PROGRESS NOTES
Occupational Therapy    Evaluation    Patient Name: Ayanna Gross  MRN: [id number]  Today's Date: 2/22/2024  Time Calculation  Start Time: 1346  Stop Time: 1401  Time Calculation (min): 15 min    Assessment  IP OT Assessment  OT Assessment: Pt received supine, agreeable to OT assessment. Assessment limited to bed level activity only d/t low BPs (which pt has trended with low BPs). Pt's BP did elevated with activity. OT to address strength and ADL training at bed level and progress to EOB when BP improves.  Prognosis: Fair  Barriers to Discharge:  (Medical complexity)  Evaluation/Treatment Tolerance: Patient limited by fatigue (Pt limited by low BP)  Medical Staff Made Aware: Yes  End of Session Communication: Bedside nurse  End of Session Patient Position: Bed, 3 rail up, Alarm on (Call bell in reach)  Plan:  Treatment Interventions: ADL retraining, UE strengthening/ROM, Endurance training, Patient/family training, Continued evaluation  OT Frequency: 2 times per week  OT Discharge Recommendations: Moderate intensity level of continued care (Pt may benefit from LTC pending progress)  OT - OK to Discharge: Yes    Subjective   Current Problem:  1. Anemia due to blood loss, acute        2. Anemia due to chronic kidney disease, on chronic dialysis (CMS/HCC)        3. Shortness of breath        4. Fever, unspecified fever cause        5. Sepsis, due to unspecified organism, unspecified whether acute organ dysfunction present (CMS/HCC)        6. Septic shock (CMS/HCC)  Transthoracic Echo (TTE) Complete    Transthoracic Echo (TTE) Complete      7. Toxic metabolic encephalopathy  Transthoracic Echo (TTE) Complete    Transthoracic Echo (TTE) Complete      8. Chronic combined systolic and diastolic heart failure (CMS/HCC)  Transthoracic Echo (TTE) Complete    Transthoracic Echo (TTE) Complete      9. Localized edema  Upper extremity venous duplex bilateral    Upper extremity venous duplex bilateral      10. Other specified soft  tissue disorders  Upper extremity venous duplex bilateral      11. Other specified soft tissue disorders  Upper extremity venous duplex bilateral      12. Acute on chronic diastolic congestive heart failure (CMS/HCC)  Transthoracic Echo (TTE) Limited    Transthoracic Echo (TTE) Limited    CANCELED: Transthoracic Echo (TTE) Complete    CANCELED: Transthoracic Echo (TTE) Complete      13. Mitral valve stenosis, unspecified etiology  Transthoracic Echo (TTE) Limited    Transthoracic Echo (TTE) Limited      14. Hypotension, unspecified  Transthoracic Echo (TTE) Limited      15. Rheumatic mitral valve disease, unspecified  Transthoracic Echo (TTE) Limited        General:  General  Reason for Referral: 73y.o. F PMH a-fib, ESRD, C-diff, recurrent L pleural effusion, MRSA presenting from nursing home with c/f L PNA and anemia, fever, worsening sacral and LE wounds. Hospital course c/b ICU stay for septic shock.  Past Medical History Relevant to Rehab: AFIB, ESRD, dialysis, C-diff, pleural effusions, endocarditis, thoracentesis, asthma, CAD, CHF, HTN, sleep apnea, DM, carpal tunnel surgery, partial mastectomy right, mastectomy left, foot surgery, umbilical hernia repair  Co-Treatment: PT  Co-Treatment Reason: medical complexity with limited tolerance to activity;  Prior to Session Communication: Bedside nurse  Patient Position Received: Bed, 3 rail up, Alarm on  General Comment: Pt cleared and agreeable by RN in ICU. (+) PIV (LEVO .8mcg), tele., pulse ox., FMS  Precautions:  Hearing/Visual Limitations: hard of hearing  Medical Precautions: Fall precautions, Infection precautions (contact precautions)  Vital Signs:   PRE(SUPINE):   ACTIVITY(SUPINE):  POST(SUPINE):   HR: 106   HR:    HR: 106  BP: 78/31(47)   BP: 82/71(77)   BP: 78/31(47)  SPO2: 96   SPO2:    SPO2: 96  RR: 23    RR:    RR: 23  *Pt remained asymptomatic during bed level activity. RN reports that this is pt's BP trend and that pressor support is only increased  when mentation changes.*    Pain:  Pain Assessment  Pain Assessment: 0-10  Pain Score:  (Pt did not rate pain to OT. Pt did grimmace and wince with LE mgmt.)    Objective   Cognition:  Overall Cognitive Status: Within Functional Limits  Orientation Level: Oriented X4     Home Living:  Type of Home: House  Lives With: Spouse  Home Adaptive Equipment: Walker rolling or standard, Wheelchair-manual  Home Layout: Other (Comment) (First floor s/u)  Home Access: Ramped entrance  Bathroom Shower/Tub: Walk-in shower  Bathroom Toilet: Handicapped height  Bathroom Equipment: Grab bars in shower   Prior Function:  Level of [place]: Needs assistance with ADLs, Needs assistance with homemaking  Receives Help From: Family  ADL Assistance: Needs assistance  Homemaking Assistance: Needs assistance  Ambulatory Assistance: Needs assistance  Hand Dominance: Right  Prior Function Comments: Independent with transfers prior to recent hospital admits;  assist with ambulation using rolling walker prior to recent hospital admits; Assist with ADLs and IADLs prior to previous admission    ADL:  Eating Assistance: Stand by (S/u)  Eating Deficit: Setup  LE Dressing Assistance: Total  Activity Tolerance:  Endurance: Other (Comment) (Tolerated 10-15 min of activity in supine position.)  Activity Tolerance Comments: Deferred EOB/OOB d/t BP and fatigue  Bed Mobility/Transfers: Bed Mobility  Bed Mobility: Yes  Bed Mobility 1  Bed Mobility 1: Long sit  Level of Assistance 1:  (Ax2 with B HHA and posterior support to achieve ~70 hip flexion from ~30 deg)    Sitting Balance:  Static Sitting Balance  Static Sitting-Balance Support: Bilateral upper extremity supported (Long sitting in bed, see 'bed mobility')  Static Sitting-Level of Assistance: Maximum assistance (Ax2)  Hand Function:  Hand Function  Gross Grasp: Impaired  Extremities: RUE   RUE : Exceptions to WFL  RUE AROM (degrees)  RUE AROM Comment: AROM impaired d/t strength  RUE Strength  RUE  Overall Strength: Deficits, Other (Comment) (R UE MMT Grossly: 3-/5) and LUE   LUE: Exceptions to WFL  LUE AROM (degrees)  LUE AROM Comment: Impaired d/t strength  LUE Strength  LUE Overall Strength: Deficits (L UE MMT Grossly: 1/5)    Outcome Measures: [facility] Daily Activity  Putting on and taking off regular lower body clothing: Total  Bathing (including washing, rinsing, drying): Total  Putting on and taking off regular upper body clothing: A lot  Toileting, which includes using toilet, bedpan or urinal: Total  Taking care of personal grooming such as brushing teeth: A lot  Eating Meals: A little  Daily Activity - Total Score: 10    Education Documentation  Handouts, taught by Rocio Cormier OT at 2/22/2024  3:06 PM.  Learner: Patient  Readiness: Acceptance  Method: Explanation  Response: Needs Reinforcement    Body Mechanics, taught by Rocio Cormier OT at 2/22/2024  3:06 PM.  Learner: Patient  Readiness: Acceptance  Method: Explanation  Response: Needs Reinforcement    Home Exercise Program, taught by Rocio Cormier OT at 2/22/2024  3:06 PM.  Learner: Patient  Readiness: Acceptance  Method: Explanation  Response: Needs Reinforcement    ADL Training, taught by Rocio Cormier OT at 2/22/2024  3:06 PM.  Learner: Patient  Readiness: Acceptance  Method: Explanation  Response: Needs Reinforcement    Education Comments  No comments found.      Goals:   Encounter Problems       Encounter Problems (Active)       OT Goals       Pt will complete grooming Min A with use of AE/compensatory techniques        Start:  02/22/24    Expected End:  03/21/24            Pt and family will verbalize 1 positioning technique to improve comfort and prevent skin breakdown.       Start:  02/22/24    Expected End:  03/21/24            Pt and family with perform simple B UE HEP SUP.       Start:  02/22/24    Expected End:  03/21/24            EOB Assessment pending Vital Signs.       Start:  02/22/24    Expected End:   03/21/24

## 2024-02-22 NOTE — CARE PLAN
Problem: Mobility  Goal: Bed mobility including supine to long sit and long sit to supine with mod assist x 2.  Outcome: Progressing     Problem: PT Problem  Goal: ROM jessica LE knee flexion ~60 degrees.  Outcome: Progressing

## 2024-02-22 NOTE — CARE PLAN
The patient's goals for the shift include Visit with family.    The clinical goals for the shift include Maintain hemodynamic stability and wean off of pressor support

## 2024-02-22 NOTE — PROGRESS NOTES
Ayanna Gross is a 73 y.o. female on day 35 of admission presenting with Septic shock (CMS/HCC).  Patient with h/o Afib, ESRD, C. Diff infection, recurrent pleural effusions, and MRSA bacteremia with MV vegetation who initially p/w anemia, fever and worsening sacral and lower extremities wounds. In the ED found to be hypotensive, anemic. Received IVF, antibiotics and admitted to the ICU with the diagnosis of septic shock. Course c/b AMS, due to worsening shock, requiring Levophed. Since then patient remains hypotensive and intermittently needs Levophed. Had thoracentesis done that showed transudative effusion. Also vascular, general surgery and podiatry were consulted for pressure ulcers. Also started on Midodrine with the dose increasing to 15 mg TID. Also received several courses of antibiotics. Given stable need for Levophed, attempt made to transfer to [facility], however, denied by insurance.   Subjective   Episodes of confusion. No other overnight events. BP still very low, requiring      Complains of some coughing. Also LH earlier today that now is resolved. Denies any pains or LH.   Objective   Scheduled medications  apixaban, 2.5 mg, oral, BID  cefepime, 1 g, intravenous, q24h  doxycylcine, 100 mg, oral, Daily  epoetin di or biosimilar, 10,000 Units, intravenous, Every Mon/Wed/Fri  escitalopram, 10 mg, oral, Nightly  fludrocortisone, 0.1 mg, oral, TID  gabapentin, 100 mg, oral, TID  heparin, 2,000 Units, intra-catheter, After Dialysis  heparin, 2,000 Units, intra-catheter, After Dialysis  heparin, 2,000 Units, intra-catheter, After Dialysis  heparin, 2,000 Units, intra-catheter, After Dialysis  insulin lispro, 0-10 Units, subcutaneous, TID with meals  ipratropium-albuteroL, 3 mL, nebulization, q6h while awake  lidocaine, 5 mL, infiltration, Once  midodrine, 20 mg, oral, TID with meals  nystatin, , Topical, BID  nystatin, , Topical, BID  pantoprazole, 40 mg, oral, Daily   Or  pantoprazole, 40 mg, intravenous,  Daily  perflutren lipid microspheres, 0.5-10 mL of dilution, intravenous, Once in imaging  perflutren protein A microsphere, 0.5 mL, intravenous, Once in imaging  psyllium, 1 packet, oral, BID  sulfur hexafluoride microsphr, 2 mL, intravenous, Once in imaging  sulfur hexafluoride microsphr, 2 mL, intravenous, Once in imaging  vancomycin, 500 mg, intravenous, Once  vancomycin, 125 mg, oral, BID  zinc oxide, 1 Application, Topical, BID    Continuous medications  norepinephrine, 0.01-3 mcg/kg/min, Last Rate: 0.12 mcg/kg/min (02/23/24 2807)    PRN medications  PRN medications: acetaminophen, albumin human, alteplase, ammonium lactate, benzocaine-menthoL, dextrose 10 % in water (D10W), dextrose, glucagon, heparin flush, heparin flush, ondansetron, oxygen, sennosides-docusate sodium, vancomycin   Physical Exam  Constitutional:       General: She is not in acute distress.     Appearance: She is normal weight. She is ill-appearing. She is not toxic-appearing.   HENT:      Head: Normocephalic and atraumatic.      Nose:      Comments: On RA     Mouth/Throat:      Mouth: Mucous membranes are moist.   Eyes:      General: No scleral icterus.     Extraocular Movements: Extraocular movements intact.      Pupils: Pupils are equal, round, and reactive to light.   Cardiovascular:      Rate and Rhythm: Regular rhythm. Tachycardia present.      Heart sounds: No murmur heard.     No friction rub. No gallop.   Pulmonary:      Effort: Pulmonary effort is normal. No respiratory distress.      Breath sounds: No wheezing or rales.   Abdominal:      General: There is no distension.      Palpations: Abdomen is soft.      Tenderness: There is no abdominal tenderness.   Musculoskeletal:      Cervical back: Normal range of motion and neck supple. No rigidity.      Comments: B/l lower extremities are wrapped. Seem to have 1+ edema.   Lymphadenopathy:      Cervical: No cervical adenopathy.   Skin:     General: Skin is warm and dry.      Coloration:  "Skin is pale. Skin is not jaundiced.   Neurological:      General: No focal deficit present.      Mental Status: She is alert and oriented to person, place, and time.   Psychiatric:         Mood and Affect: Mood normal.         Behavior: Behavior normal.     Last Recorded Vitals  Blood pressure 93/55, pulse (!) 111, temperature 36.9 °C (98.4 °F), temperature source Temporal, resp. rate 17, height 1.575 m (5' 2\"), weight 108 kg (238 lb 12.1 oz), SpO2 90 %.  Intake/Output last 3 Shifts:  I/O last 3 completed shifts:  In: 707.4 (7.6 mL/kg) [P.O.:360; I.V.:247.4 (2.7 mL/kg); IV Piggyback:100]  Out: 510 (5.5 mL/kg) [Stool:510]  Dosing Weight: 93 kg   Relevant Results  Results for orders placed or performed during the hospital encounter of 01/19/24 (from the past 24 hour(s))   POCT GLUCOSE   Result Value Ref Range    POCT Glucose 283 (H) 74 - 99 mg/dL   POCT GLUCOSE   Result Value Ref Range    POCT Glucose 214 (H) 74 - 99 mg/dL   POCT GLUCOSE   Result Value Ref Range    POCT Glucose 194 (H) 74 - 99 mg/dL   CBC   Result Value Ref Range    WBC 13.5 (H) 4.4 - 11.3 x10*3/uL    nRBC 0.0 0.0 - 0.0 /100 WBCs    RBC 2.91 (L) 4.00 - 5.20 x10*6/uL    Hemoglobin 9.1 (L) 12.0 - 16.0 g/dL    Hematocrit 29.3 (L) 36.0 - 46.0 %     (H) 80 - 100 fL    MCH 31.3 26.0 - 34.0 pg    MCHC 31.1 (L) 32.0 - 36.0 g/dL    RDW 20.1 (H) 11.5 - 14.5 %    Platelets 215 150 - 450 x10*3/uL   Basic metabolic panel   Result Value Ref Range    Glucose 335 (H) 65 - 99 mg/dL    Sodium 134 133 - 145 mmol/L    Potassium 3.8 3.4 - 5.1 mmol/L    Chloride 100 97 - 107 mmol/L    Bicarbonate 19 (L) 24 - 31 mmol/L    Urea Nitrogen 38 (H) 8 - 25 mg/dL    Creatinine 3.20 (H) 0.40 - 1.60 mg/dL    eGFR 15 (L) >60 mL/min/1.73m*2    Calcium 9.0 8.5 - 10.4 mg/dL    Anion Gap 15 <=19 mmol/L   Magnesium   Result Value Ref Range    Magnesium 1.70 1.60 - 3.10 mg/dL   POCT GLUCOSE   Result Value Ref Range    POCT Glucose 295 (H) 74 - 99 mg/dL   No results found. "   Assessment/Plan   Principal Problem:    Septic shock (CMS/[facility])  Active Problems:    Pneumonia    Low blood pressure    End stage renal disease (CMS/[facility])    Anemia due to blood loss, acute  73 with h/o Afib, ESRD, C. Diff infection, recurrent pleural effusions, and MRSA bacteremia with MV vegetation who initially p/w anemia, fever and worsening sacral and lower extremities wounds. In the ED found to be hypotensive, anemic. Received IVF, antibiotics and admitted to the ICU with the diagnosis of septic shock. Course c/b AMS, due to worsening shock, requiring Levophed. Since then patient remains hypotensive and intermittently needs Levophed. Had thoracentesis done that showed transudative effusion. Also vascular, general surgery and podiatry were consulted for pressure ulcers. Also started on Midodrine with the dose increasing to 15 mg TID. Also received several courses of antibiotics. Given stable need for Levophed, attempt made to transfer to [facility], however, denied by insurance.     Neuro: intermittent episodes of delirium/confusion      Maintain MAP      Continue gabapentin      Continue Lexapro      Supportive measures.   CV: patient in persistent shock, unclear etiology, normal EF, but possible diastolic dysfunction. Echo consistent with left sided volume overload. Afib, rate acceptable but not full controlled      Continue Apixaban      Continue Levophed, requiring it intermittently      Continue Midodorine      Continue to monitor       Cardiology consult done, input appreciated.     Pulmonary: no acute issues.       Supplemental O2 as needed      Continue Duo-Neb    Renal: ESRD on HD      Nephrology on consult, will FU with their recommendations      HD as per nephrology      Daily RFP      Treat electrolyte abnormalities as indicated    GI: C. Diff infection, otherwise no acute issues      GI prophylaxis      Continue diet    Endo: concern for adrenal insufficiency, T2DM. BS at times elevated      Continue  fludrocortisone      Continue SSI      Hypoglycemic protocol        Hem/Onc: chronic anemia, no acute bleed      Continue to monitor with daily CBC    ID: persistent leukocytosis, concerning for ongoing infection. H/o infective endocarditis. Also wound infections      ID on consult, input appreciated      Continue Cefepime, Doxycycline and IV vancomycin      Oral vancomycin for C. Diff.     PT/OT.   This was a critical care visit for shock. Total time 38 min.   Charly Grossman MD

## 2024-02-22 NOTE — PROGRESS NOTES
Vancomycin Dosing by Pharmacy- FOLLOW-UP (HEMODIALYSIS)    Ayanna Gross is a 73 y.o. year old female who Pharmacy has been consulted for vancomycin dosing for Vancomycin Indications: Skin & Soft Tissue. Based on the patient's indication and renal status this patient will be dosed based on a pre-HD level of 20-25 mcg/mL.     Patient is currently on hemodialysis.    Vancomycin maintenance dose: 750 mg after each dialysis session User Schedule (TBD)     Vancomycin random level today 24. No dialysis or vanco today.    Lab Results   Component Value Date    VANCORANDOM 24.0 (H) 02/22/2024       Visit Vitals  BP (!) 73/28   Pulse 101   Temp 36.5 °C (97.7 °F) (Temporal)   Resp 17        Lab Results   Component Value Date    CREATININE 2.70 (H) 02/22/2024    CREATININE 3.30 (H) 02/21/2024    CREATININE 2.60 (H) 02/20/2024    CREATININE 3.20 (H) 02/19/2024       I/O last 3 completed shifts:  In: 659.3 (7.1 mL/kg) [P.O.:480; I.V.:79.3 (0.9 mL/kg); IV Piggyback:100]  Out: 1820 (19.6 mL/kg) [Other:1500; Stool:320]  Dosing Weight: 93 kg     Assessment/Plan     Above goal random/trough level. Vancomycin dose after dialysis session will be adjusted to 500 mg. HD Sessions scheduled for User Schedule (TBD). Next anticipated dialysis session is 2/23.  Pre-HD level will be obtained on 2/26 at 0500. May be obtained sooner if clinically indicated. If level is above goal, random level with AM labs the next morning will be obtained.   Will continue to monitor renal function daily while on vancomycin and order serum creatinine at least every 48 hours if not already ordered.  Follow for continued vancomycin needs, clinical response, and signs/symptoms of toxicity.     Kendall Alfonso, FaithD

## 2024-02-22 NOTE — PROGRESS NOTES
Occupational Therapy                 Therapy Communication Note    Patient Name: Ayanna Gross  MRN: [id number]  Today's Date: 2/22/2024     Discipline: Occupational Therapy    Missed Visit Reason: Cancel (Pt hypotensive at rest with a BP of 79/40. Not medically appropriate.)    Missed Time: Cancel

## 2024-02-22 NOTE — PROGRESS NOTES
Physical Therapy    Physical Therapy Evaluation & Treatment    Patient Name: Ayanna Gross  MRN: [id number]  Today's Date: 2/22/2024   Time Calculation  Start Time: 1345  Stop Time: 1405  Time Calculation (min): 20 min    Assessment/Plan   PT Assessment  PT Assessment Results: Decreased strength, Decreased range of motion, Decreased endurance, Impaired balance, Decreased mobility, Decreased safety awareness  Rehab Prognosis: Good  Evaluation/Treatment Tolerance: Patient limited by fatigue, Patient limited by pain  End of Session Communication: Bedside nurse  Assessment Comment: 73 year old female presents with decline from baseline functional mobility,  decreased ROM jessica LE,  decreased strength jessica LE, impaired balance, decreased tolerance to activity, and decreased safety awareness.  End of Session Patient Position: Bed, 3 rail up, Alarm on   IP OR SWING BED PT PLAN  Inpatient or Swing Bed: Inpatient  PT Plan  Treatment/Interventions: Bed mobility, Transfer training, Balance training, Neuromuscular re-education, Strengthening, Endurance training, Range of motion, Therapeutic exercise, Therapeutic activity  PT Plan: Skilled PT  PT Frequency: 3 times per week  PT Discharge Recommendations: Moderate intensity level of continued care  PT Recommended Transfer Status: Total assist  PT - OK to Discharge: Yes (with skilled physical therapy services at next level of care.)      Subjective     General Visit Information:  General  Reason for Referral: Impaired mobility with septic shock  Past Medical History Relevant to Rehab: AFIB, ESRD, dialysis, C-diff, pleural effusions, endocarditis, thoracentesis, asthma, CAD, CHF, HTN, sleep apnea, DM, carpal tunnel surgery, partial mastectomy right, mastectomy left, foot surgery, umbilical hernia repair  Co-Treatment: OT  Co-Treatment Reason: medical complexity with limited tolerance to activity;  need for 2nd skilled person for therapeutic handling during functional mobility to  optimize safety.  Prior to Session Communication: Bedside nurse  Patient Position Received: Bed, 3 rail up, Alarm on  General Comment: 73 year old female admit from SNF rehab with fever, anemia, and worsening LE wounds;  patient with recent hospital admit 12/06/2023 with low blood pressure during dialysis treatment.  Home Living:  Home Living  Type of Home: House  Lives With: Significant other  Home Adaptive Equipment: Walker rolling or standard, Wheelchair-manual  Home Layout:  (Patient can stay on main level of home)  Home Access: Ramped entrance  Bathroom Shower/Tub: Walk-in shower  Bathroom Toilet: Handicapped height  Bathroom Equipment: Grab bars in shower  Prior Level of Function:  Prior Function Per Pt/Caregiver Report  Prior Function Comments: Independent with transfers prior to recent hospital admits;  assist with ambulation using rolling walker prior to recent hospital admits.  Precautions:  Precautions  Hearing/Visual Limitations: hard of hearing  Medical Precautions: Fall precautions, Infection precautions (contact precautions for wound infection)  Vital Signs:  Vital Signs  BP: (!) 78/31 (82/71 post treatment;  map 77 post treatment)  MAP (mmHg): 47    Objective   Pain:  Pain Assessment  Pain Assessment: FLACC (Face, Legs, Activity, Cry, Consolability)  Pain Score: 5 - Moderate pain  Pain Type: Chronic pain  Pain Location: Leg  Pain Orientation: Left, Right (during movement)  Cognition:  Cognition  Overall Cognitive Status: Within Functional Limits (memory deficits?)    General Assessments:  General Observation  General Observation: bandages jessica LE ankles and feet;  edema jessica LE;  rigidity jessica LE hips-knees-ankles.               Activity Tolerance  Endurance: Decreased tolerance for upright activites  Activity Tolerance Comments: Fatigue;  low BP;  pain jessica LE    Sensation  Light Touch: Partial deficits in the RLE, Partial deficits in the LLE (numbness jessica LE ankles and feet)        Coordination  Movements are Fluid and Coordinated: No  Lower Body Coordination: difficult to accurately assess coordination due to strength deficits jessica LE            Functional Assessments:  Bed Mobility 1  Bed Mobility 1: Long sit  Level of Assistance 1: Maximum assistance (x 2 persons)  Bed Mobility Comments 1: Patient used jessica UE to pull on therapist's arms to assist with achieving long sitting.    Transfers  Transfer: No    Ambulation/Gait Training  Ambulation/Gait Training Performed: No    Stairs  Stairs: No  Extremity/Trunk Assessments:  RLE   RLE : Exceptions to WFL  PROM RLE (degrees)  R Hip Flexion 0-125: 30  R Hip ABduction 0-45: 10  R Knee Flexion 0-130: 30  R Ankle Dorsiflexion 0-20: 0  Strength RLE  R Hip Flexion: 1/5  R Knee Extension: 2-/5  R Ankle Dorsiflexion: 2-/5  LLE   LLE : Exceptions to WFL  PROM LLE (degrees)  L Hip Flexion 0-125: 20  L Hip ABduction 0-45: 10  L Knee Flexion 0-130: 20  L Ankle Dorsiflexion 0-20: 0  Strength LLE  L Hip Flexion: 1/5  L Knee Extension: 1/5  L Ankle Dorsiflexion: 1/5  Treatments:  Therapeutic Exercise  Therapeutic Exercise Performed: Yes  Therapeutic Exercise Activity 1: ankle pumps, heel slides, hip abduction 5 reps x 1 set jessica LE with assist              Bed Mobility 1  Bed Mobility 1: Long sit  Level of Assistance 1: Maximum assistance (x 2 persons)  Bed Mobility Comments 1: Patient used jessica UE to pull on therapist's arms to assist with achieving long sitting.    Ambulation/Gait Training  Ambulation/Gait Training Performed: No  Transfers  Transfer: No    Stairs  Stairs: No       Outcome Measures:  [facility] Basic Mobility  Turning from your back to your side while in a flat bed without using bedrails: Total  Moving from lying on your back to sitting on the side of a flat bed without using bedrails: Total  Moving to and from bed to chair (including a wheelchair): Total  Standing up from a chair using your arms (e.g. wheelchair or bedside chair): Total  To walk in  hospital room: Total  Climbing 3-5 steps with railing: Total  Basic Mobility - Total Score: 6    Encounter Problems       Encounter Problems (Active)       Mobility       Bed mobility including supine to long sit and long sit to supine with mod assist x 2. (Progressing)       Start:  02/22/24    Expected End:  03/07/24            Bed mobility including supine to sit and sit to supine with max assist x 1-2 persons.       Start:  02/22/24    Expected End:  03/07/24            Patient will maintain midline while sitting on side of bed with mod assist x 1-2 persons.       Start:  02/22/24    Expected End:  03/07/24               PT Problem       ROM jessica LE knee flexion ~60 degrees. (Progressing)       Start:  02/22/24    Expected End:  03/07/24                   Education Documentation  Mobility Training, taught by Brendon Martinez, PT at 2/22/2024  2:51 PM.  Learner: Patient  Readiness: Acceptance  Method: Demonstration, Explanation  Response: Needs Reinforcement    Education Comments  No comments found.

## 2024-02-23 NOTE — PROGRESS NOTES
Ayanna Gross is a 73 y.o. female on day 35 of admission presenting with Septic shock (CMS/HCC).  Patient with h/o Afib, ESRD, C. Diff infection, recurrent pleural effusions, and MRSA bacteremia with MV vegetation who initially p/w anemia, fever and worsening sacral and lower extremities wounds. In the ED found to be hypotensive, anemic. Received IVF, antibiotics and admitted to the ICU with the diagnosis of septic shock. Course c/b AMS, due to worsening shock, requiring Levophed. Since then patient remains hypotensive and intermittently needs Levophed. Had thoracentesis done that showed transudative effusion. Also vascular, general surgery and podiatry were consulted for pressure ulcers. Also started on Midodrine with the dose increasing to 15 mg TID. Also received several courses of antibiotics. Given stable need for Levophed, attempt made to transfer to [facility], however, denied by insurance.   Subjective   No acute overnight events. BP still  low, requiring intermittent Levophed.      Overall is feeling better today. Denies any pains. Cough and SOB have improved.   Objective   Scheduled medications  albumin human, 12.5 g, intravenous, Daily  apixaban, 2.5 mg, oral, BID  cefepime, 1 g, intravenous, q24h  doxycylcine, 100 mg, oral, Daily  epoetin di or biosimilar, 10,000 Units, intravenous, Every Mon/Wed/Fri  escitalopram, 10 mg, oral, Nightly  fludrocortisone, 0.1 mg, oral, TID  gabapentin, 100 mg, oral, TID  heparin, 2,000 Units, intra-catheter, After Dialysis  heparin, 2,000 Units, intra-catheter, After Dialysis  heparin, 2,000 Units, intra-catheter, After Dialysis  heparin, 2,000 Units, intra-catheter, After Dialysis  insulin lispro, 0-10 Units, subcutaneous, TID with meals  ipratropium-albuteroL, 3 mL, nebulization, q6h while awake  lidocaine, 5 mL, infiltration, Once  midodrine, 20 mg, oral, TID with meals  nystatin, , Topical, BID  nystatin, , Topical, BID  pantoprazole, 40 mg, oral, Daily    Or  pantoprazole, 40 mg, intravenous, Daily  perflutren lipid microspheres, 0.5-10 mL of dilution, intravenous, Once in imaging  perflutren protein A microsphere, 0.5 mL, intravenous, Once in imaging  psyllium, 1 packet, oral, BID  sulfur hexafluoride microsphr, 2 mL, intravenous, Once in imaging  sulfur hexafluoride microsphr, 2 mL, intravenous, Once in imaging  vancomycin, 125 mg, oral, BID  zinc oxide, 1 Application, Topical, BID    Continuous medications  norepinephrine, 0.01-3 mcg/kg/min, Last Rate: 0.11 mcg/kg/min (02/23/24 1802)    PRN medications  PRN medications: acetaminophen, albumin human, alteplase, ammonium lactate, benzocaine-menthoL, dextrose 10 % in water (D10W), dextrose, glucagon, heparin flush, heparin flush, ondansetron, oxygen, sennosides-docusate sodium, vancomycin   Physical Exam  Constitutional:       General: She is not in acute distress.     Appearance: She is normal weight. She is ill-appearing. She is not toxic-appearing.   HENT:      Head: Normocephalic and atraumatic.      Nose:      Comments: On RA     Mouth/Throat:      Mouth: Mucous membranes are moist.   Eyes:      General: No scleral icterus.     Extraocular Movements: Extraocular movements intact.      Pupils: Pupils are equal, round, and reactive to light.   Cardiovascular:      Rate and Rhythm: Tachycardia present. Rhythm irregular.      Heart sounds: No murmur heard.     No friction rub. No gallop.   Pulmonary:      Effort: Pulmonary effort is normal. No respiratory distress.      Breath sounds: No wheezing or rales.   Abdominal:      General: There is no distension.      Palpations: Abdomen is soft.      Tenderness: There is no abdominal tenderness.   Musculoskeletal:      Cervical back: Normal range of motion and neck supple. No rigidity.      Comments: B/l lower extremities are wrapped. Seem to have 1+ edema.   Lymphadenopathy:      Cervical: No cervical adenopathy.   Skin:     General: Skin is warm and dry.       "Coloration: Skin is pale. Skin is not jaundiced.   Neurological:      General: No focal deficit present.      Mental Status: She is alert and oriented to person, place, and time.   Psychiatric:         Mood and Affect: Mood normal.         Behavior: Behavior normal.     Last Recorded Vitals  Blood pressure (!) 82/36, pulse 108, temperature 36.5 °C (97.7 °F), temperature source Temporal, resp. rate 20, height 1.575 m (5' 2\"), weight 108 kg (238 lb 12.1 oz), SpO2 92 %.  Intake/Output last 3 Shifts:  I/O last 3 completed shifts:  In: 707.4 (7.6 mL/kg) [P.O.:360; I.V.:247.4 (2.7 mL/kg); IV Piggyback:100]  Out: 510 (5.5 mL/kg) [Stool:510]  Dosing Weight: 93 kg   Relevant Results  Results for orders placed or performed during the hospital encounter of 01/19/24 (from the past 24 hour(s))   POCT GLUCOSE   Result Value Ref Range    POCT Glucose 194 (H) 74 - 99 mg/dL   CBC   Result Value Ref Range    WBC 13.5 (H) 4.4 - 11.3 x10*3/uL    nRBC 0.0 0.0 - 0.0 /100 WBCs    RBC 2.91 (L) 4.00 - 5.20 x10*6/uL    Hemoglobin 9.1 (L) 12.0 - 16.0 g/dL    Hematocrit 29.3 (L) 36.0 - 46.0 %     (H) 80 - 100 fL    MCH 31.3 26.0 - 34.0 pg    MCHC 31.1 (L) 32.0 - 36.0 g/dL    RDW 20.1 (H) 11.5 - 14.5 %    Platelets 215 150 - 450 x10*3/uL   Basic metabolic panel   Result Value Ref Range    Glucose 335 (H) 65 - 99 mg/dL    Sodium 134 133 - 145 mmol/L    Potassium 3.8 3.4 - 5.1 mmol/L    Chloride 100 97 - 107 mmol/L    Bicarbonate 19 (L) 24 - 31 mmol/L    Urea Nitrogen 38 (H) 8 - 25 mg/dL    Creatinine 3.20 (H) 0.40 - 1.60 mg/dL    eGFR 15 (L) >60 mL/min/1.73m*2    Calcium 9.0 8.5 - 10.4 mg/dL    Anion Gap 15 <=19 mmol/L   Magnesium   Result Value Ref Range    Magnesium 1.70 1.60 - 3.10 mg/dL   POCT GLUCOSE   Result Value Ref Range    POCT Glucose 295 (H) 74 - 99 mg/dL   POCT GLUCOSE   Result Value Ref Range    POCT Glucose 286 (H) 74 - 99 mg/dL   No results found.   Assessment/Plan   Principal Problem:    Septic shock (CMS/[facility])  Active " Problems:    Pneumonia    Low blood pressure    End stage renal disease (CMS/HCC)    Anemia due to blood loss, acute  73 with h/o Afib, ESRD, C. Diff infection, recurrent pleural effusions, and MRSA bacteremia with MV vegetation who initially p/w anemia, fever and worsening sacral and lower extremities wounds. In the ED found to be hypotensive, anemic. Received IVF, antibiotics and admitted to the ICU with the diagnosis of septic shock. Course c/b AMS, due to worsening shock, requiring Levophed. Since then patient remains hypotensive and intermittently needs Levophed. Had thoracentesis done that showed transudative effusion. Also vascular, general surgery and podiatry were consulted for pressure ulcers. Also started on Midodrine with the dose increasing to 15 mg TID. Also received several courses of antibiotics. Given stable need for Levophed, attempt made to transfer to [facility], however, denied by insurance.     Neuro: intermittent episodes of delirium/confusion      Maintain MAP      Continue gabapentin      Continue Lexapro      Supportive measures.     CV: patient in persistent shock, unclear etiology, normal EF, but possible diastolic dysfunction. Echo consistent with left sided volume overload. Afib, rate acceptable but not fully controlled      Continue Apixaban      Continue Levophed, requiring it intermittently      Continue Midodorine      Continue to monitor       Cardiology consult done, input appreciated.     Pulmonary: no acute issues.       Supplemental O2 as needed      Continue Duo-Neb    Renal: ESRD on HD      Nephrology on consult, will FU with their recommendations      HD as per nephrology      Daily RFP      Treat electrolyte abnormalities as indicated    GI: C. Diff infection, otherwise no acute issues      GI prophylaxis      Continue diet    Endo: concern for adrenal insufficiency, T2DM. BS at times elevated, but more or less within acceptable range.       Continue fludrocortisone       Continue SSI      Hypoglycemic protocol        Hem/Onc: chronic anemia, no acute bleed      Continue to monitor with daily CBC    ID: persistent leukocytosis, concerning for ongoing infection. H/o infective endocarditis. Also wound infections      ID on consult, input appreciated      Continue Cefepime, Doxycycline and IV vancomycin      Oral vancomycin for C. Diff.     PT/OT.   This was a critical care visit for shock. Total time 42 min.   Charly Grossman MD

## 2024-02-23 NOTE — PROGRESS NOTES
Ayanna Gross is a 73 y.o. female on day 35 of admission presenting with Septic shock (CMS/HCC).      Subjective   No overnight events.  Scheduled for dialysis today.  Still hypotensive and on pressors.       Scheduled medications  apixaban, 2.5 mg, oral, BID  cefepime, 1 g, intravenous, q24h  doxycylcine, 100 mg, oral, Daily  epoetin di or biosimilar, 10,000 Units, intravenous, Every Mon/Wed/Fri  escitalopram, 10 mg, oral, Nightly  fludrocortisone, 0.1 mg, oral, TID  gabapentin, 100 mg, oral, TID  heparin, 2,000 Units, intra-catheter, After Dialysis  heparin, 2,000 Units, intra-catheter, After Dialysis  heparin, 2,000 Units, intra-catheter, After Dialysis  heparin, 2,000 Units, intra-catheter, After Dialysis  insulin lispro, 0-10 Units, subcutaneous, TID with meals  ipratropium-albuteroL, 3 mL, nebulization, q6h while awake  lidocaine, 5 mL, infiltration, Once  midodrine, 20 mg, oral, TID with meals  nystatin, , Topical, BID  nystatin, , Topical, BID  pantoprazole, 40 mg, oral, Daily   Or  pantoprazole, 40 mg, intravenous, Daily  perflutren lipid microspheres, 0.5-10 mL of dilution, intravenous, Once in imaging  perflutren protein A microsphere, 0.5 mL, intravenous, Once in imaging  psyllium, 1 packet, oral, BID  sulfur hexafluoride microsphr, 2 mL, intravenous, Once in imaging  sulfur hexafluoride microsphr, 2 mL, intravenous, Once in imaging  vancomycin, 500 mg, intravenous, Once  vancomycin, 125 mg, oral, BID  zinc oxide, 1 Application, Topical, BID      Continuous medications  norepinephrine, 0.01-3 mcg/kg/min, Last Rate: 0.1 mcg/kg/min (02/23/24 0904)      PRN medications  PRN medications: acetaminophen, albumin human, alteplase, ammonium lactate, benzocaine-menthoL, dextrose 10 % in water (D10W), dextrose, glucagon, heparin flush, heparin flush, ondansetron, oxygen, sennosides-docusate sodium, vancomycin      Objective     Vitals 24HR  Heart Rate:  [102-116]   Temp:  [36.4 °C (97.5 °F)-37.1 °C (98.8 °F)]    Resp:  [17-27]   BP: ()/(30-98)   Weight:  [97.3 kg (214 lb 8.1 oz)-108 kg (238 lb 12.1 oz)]   SpO2:  [90 %-100 %]     General: Pleasant elderly woman, not in acute distress  Access: TDC in place  Lungs: Clear anteriorly  Heart: S1 and S2, regular  Abdomen: Soft, nontender  Extremities:  Bilateral UE and LE edema, dressing over lower legs  Neuro: Awake and interactive    Intake/Output last 3 Shifts:    Intake/Output Summary (Last 24 hours) at 2/23/2024 1025  Last data filed at 2/23/2024 0400  Gross per 24 hour   Intake 393.88 ml   Output 190 ml   Net 203.88 ml       Relevant Results    Results for orders placed or performed during the hospital encounter of 01/19/24 (from the past 24 hour(s))   POCT GLUCOSE   Result Value Ref Range    POCT Glucose 283 (H) 74 - 99 mg/dL   POCT GLUCOSE   Result Value Ref Range    POCT Glucose 214 (H) 74 - 99 mg/dL   POCT GLUCOSE   Result Value Ref Range    POCT Glucose 194 (H) 74 - 99 mg/dL   CBC   Result Value Ref Range    WBC 13.5 (H) 4.4 - 11.3 x10*3/uL    nRBC 0.0 0.0 - 0.0 /100 WBCs    RBC 2.91 (L) 4.00 - 5.20 x10*6/uL    Hemoglobin 9.1 (L) 12.0 - 16.0 g/dL    Hematocrit 29.3 (L) 36.0 - 46.0 %     (H) 80 - 100 fL    MCH 31.3 26.0 - 34.0 pg    MCHC 31.1 (L) 32.0 - 36.0 g/dL    RDW 20.1 (H) 11.5 - 14.5 %    Platelets 215 150 - 450 x10*3/uL   Basic metabolic panel   Result Value Ref Range    Glucose 335 (H) 65 - 99 mg/dL    Sodium 134 133 - 145 mmol/L    Potassium 3.8 3.4 - 5.1 mmol/L    Chloride 100 97 - 107 mmol/L    Bicarbonate 19 (L) 24 - 31 mmol/L    Urea Nitrogen 38 (H) 8 - 25 mg/dL    Creatinine 3.20 (H) 0.40 - 1.60 mg/dL    eGFR 15 (L) >60 mL/min/1.73m*2    Calcium 9.0 8.5 - 10.4 mg/dL    Anion Gap 15 <=19 mmol/L   Magnesium   Result Value Ref Range    Magnesium 1.70 1.60 - 3.10 mg/dL   POCT GLUCOSE   Result Value Ref Range    POCT Glucose 295 (H) 74 - 99 mg/dL          Assessment/Plan      ESRD on HD  Hypotension, on pressors  Edema/fluid overload  Anemia in  CKD, on BIB    Scheduled for dialysis today and orders were reviewed with the ICU nurse.  She seems stable from a pulmonary standpoint but peripheral edema looks worse.  I will try for increased UF goal of 2 L today with IV albumin support, and I'm ok with increasing norepinephrine by up to 5 mcg/min to facilitate UF.    Continue other care.    Dr. Andrade will be covering over the weekend.      Raj Mgcee MD

## 2024-02-23 NOTE — PROGRESS NOTES
Patient remains in the ICU. Patient on levophed. At this time there is not a safe discharge plan in place. Patient was evaluated by therapy. Will follow.       02/23/24 4142   Discharge Planning   Home or Post Acute Services Other (Comment)  (TBD)   Patient expects to be discharged to: TBD   Does the patient need discharge transport arranged? Yes   RoundTrip coordination needed? Yes

## 2024-02-23 NOTE — PROGRESS NOTES
Ayanna Gross is a 73 y.o. female on day 35 of admission presenting with Septic shock (CMS/HCC).    Subjective   Interval History:   Awake, alert  on pressors support  Afebrile, no chills  Dialysis at bedside  Denies nausea, vomiting  No chest pain    Objective   Range of Vitals (last 24 hours)  Heart Rate:  [100-116]   Temp:  [36.5 °C (97.7 °F)-37.1 °C (98.8 °F)]   Resp:  [13-27]   BP: ()/(21-98)   Weight:  [108 kg (238 lb 12.1 oz)]   SpO2:  [80 %-100 %]   Daily Weight  02/23/24 : 108 kg (238 lb 12.1 oz)    Body mass index is 43.67 kg/m².    Physical Exam  Constitutional:       Appearance: Awake, alert  HENT:      Head: Normocephalic and atraumatic.      Nose: Nose normal.   Eyes:      Extraocular Movements: Extraocular movements intact.      Conjunctiva/sclera: Conjunctivae normal.   Cardiovascular:      Heart sounds: Normal heart sounds, S1 normal and S2 normal.   Pulmonary:      Breath sounds: Decreased breath sounds present.   Abdominal:      General: Bowel sounds are normal.      Palpations: Abdomen is soft.   Musculoskeletal:      Cervical back: Normal range of motion and neck supple.   Skin:     Comments: Bilateral posterior heel eschar -photos examined  Neurological:      Mental Status: She is awake, alert    Antibiotics  aspirin tablet 325 mg  acetaminophen (Tylenol) tablet 650 mg  cefepime (Maxipime) 1 g in dextrose 5 % 50 mL IV  sodium chloride 0.9 % bolus 2,229 mL  apixaban (Eliquis) tablet 2.5 mg  escitalopram (Lexapro) tablet 10 mg  febuxostat (Uloric) tablet 40 mg  fenofibrate (Triglide) tablet 160 mg  gabapentin (Neurontin) capsule 100 mg  midodrine (Proamatine) tablet 15 mg  nystatin (Mycostatin) 100,000 unit/gram powder  oxyCODONE-acetaminophen (Percocet) 5-325 mg per tablet 1 tablet  simvastatin (Zocor) tablet 20 mg  piperacillin-tazobactam-dextrose (Zosyn) IV 2.25 g  vancomycin (Vancocin) capsule 125 mg  oxygen (O2) therapy  dextrose 50 % injection 25 g  glucagon (Glucagen) injection 1  mg  dextrose 10 % in water (D10W) infusion  insulin lispro (HumaLOG) injection 0-10 Units  nystatin (Mycostatin) 100,000 unit/gram powder 1 Application  vancomycin-diluent combo no.1 (Xellia) IVPB 1,750 mg  piperacillin-tazobactam-dextrose (Zosyn) IV 2.25 g  sodium chloride 0.9 % bolus 500 mL  norepinephrine (Levophed) 8 mg in dextrose 5% 250 mL (0.032 mg/mL) infusion (premix)  vancomycin (Vancocin) placeholder  pantoprazole (ProtoNix) EC tablet 40 mg  pantoprazole (ProtoNix) injection 40 mg  sennosides-docusate sodium (Judy-Colace) 8.6-50 mg per tablet 1 tablet  doxycycline (Vibramycin) in dextrose 5 % in water (D5W) 100 mL  mg  LORazepam (Ativan) injection 2 mg  magnesium sulfate IV 2 g  zinc oxide 20 % ointment 1 Application  nystatin (Mycostatin) cream  norepinephrine (Levophed) 8 mg in dextrose 5% 250 mL (0.032 mg/mL) infusion (premix)  heparin 1,000 unit/mL injection 2,000 Units  heparin 1,000 unit/mL injection 2,000 Units  nystatin (Mycostatin) ointment  acetaminophen (Tylenol) oral liquid 1,000 mg  albumin human 25 % solution 12.5 g  perflutren lipid microspheres (Definity) injection 0.5-10 mL of dilution  sulfur hexafluoride microsphr (Lumason) injection 24.28 mg  perflutren protein A microsphere (Optison) injection 0.5 mL  ipratropium-albuteroL (Duo-Neb) 0.5-2.5 mg/3 mL nebulizer solution 3 mL  sodium chloride 3 % nebulizer solution 3 mL  vancomycin-diluent combo no.1 (Xellia) IVPB 750 mg  sennosides-docusate sodium (Judy-Colace) 8.6-50 mg per tablet 1 tablet  acetaminophen (Tylenol) tablet 650 mg  doxycycline (Vibramycin) capsule 100 mg  magnesium oxide (Mag-Ox) tablet 400 mg  vasopressin (Vasostrict) 0.2 unit/mL infusion  norepinephrine (Levophed) 8 mg in dextrose 5% 250 mL (0.032 mg/mL) infusion (premix)  heparin 1,000 unit/mL injection 2,000 Units  heparin 1,000 unit/mL injection 2,000 Units  albumin human 25 % solution 12.5 g  vancomycin (Xellia) 1 g in 200 mL (Xellia) IVPB 1  g  ipratropium-albuteroL (Duo-Neb) 0.5-2.5 mg/3 mL nebulizer solution 3 mL  sodium chloride 3 % nebulizer solution 3 mL  albumin human 5 % infusion 12.5 g  heparin 1,000 unit/mL injection 2,000 Units  heparin 1,000 unit/mL injection 2,000 Units  vancomycin (Vancocin) capsule 125 mg  albumin human 25 % solution 12.5 g  heparin 1,000 unit/mL injection 2,000 Units  heparin 1,000 unit/mL injection 2,000 Units  ipratropium-albuteroL (Duo-Neb) 0.5-2.5 mg/3 mL nebulizer solution 3 mL  lidocaine (Xylocaine) 10 mg/mL (1 %) injection 50 mg  sodium phosphate 15 mmol in sodium chloride 0.9% 250 mL IV  sod phos di, mono-K phos mono (K Phos Neutral) tablet 250 mg  potassium, sodium phosphates (Phos-NaK) 280-160-250 mg packet 1 packet  doxycycline (Vibramycin) capsule 100 mg  fludrocortisone (Florinef) tablet 0.1 mg  gabapentin (Neurontin) capsule 100 mg  acetaminophen (Tylenol) tablet 1,000 mg  benzocaine-menthoL (Dermoplast) topical spray  vancomycin (Vancocin) capsule 125 mg  heparin 1,000 unit/mL injection 2,000 Units  heparin 1,000 unit/mL injection 2,000 Units  magnesium sulfate IV 2 g  albumin human 25 % solution 12.5 g  promethazine (Phenergan) injection 12.5 mg  ondansetron (Zofran) injection 4 mg  lidocaine (Xylocaine) 10 mg/mL (1 %) injection 50 mg  alteplase (Cathflo Activase) injection 2 mg  acetaminophen (Tylenol) tablet 650 mg  psyllium (Metamucil) 3.4 gram packet 1 packet  perflutren lipid microspheres (Definity) injection 0.5-10 mL of dilution  sulfur hexafluoride microsphr (Lumason) injection 24.28 mg  perflutren protein A microsphere (Optison) injection 0.5 mL  epoetin di-epbx (Retacrit) injection 10,000 Units  sulfur hexafluoride microsphr (Lumason) injection 24.28 mg  perflutren lipid microspheres (Definity) injection 0.5-10 mL of dilution  perflutren lipid microspheres (Definity) injection 0.5-10 mL of dilution  sulfur hexafluoride microsphr (Lumason) injection 24.28 mg  perflutren protein A microsphere  (Optison) injection 0.5 mL  ammonium lactate (Lac-Hydrin) 12 % lotion 1 Application  norepinephrine (Levophed) 8 mg in dextrose 5% 250 mL (0.032 mg/mL) infusion (premix)  fludrocortisone (Florinef) tablet 0.1 mg  albumin human 25 % solution 25 g  albumin human 25 % solution 25 g  meropenem (Merrem) 500 mg in sodium chloride 0.9 % 100 mL IV  potassium chloride 20 mEq in 100 mL IV premix  potassium chloride 20 mEq in 100 mL IV premix  magnesium sulfate IV 2 g  cefepime (Maxipime) 1 g in dextrose 5 % 50 mL IV  vancomycin-diluent combo no.1 (Xellia) IVPB 750 mg  albumin human 25 % solution 12.5 g  vancomycin (Xellia) 1 g in 200 mL (Xellia) IVPB 1 g  vancomycin (Vancocin) placeholder  vancomycin (Xellia) 1 g in 200 mL (Xellia) IVPB 1 g  midodrine (Proamatine) tablet 20 mg  vancomycin-diluent combo no.1 (Xellia) IVPB 750 mg  magnesium sulfate IV 2 g  heparin flush 100 unit/mL syringe 160 Units  heparin flush 100 unit/mL syringe 160 Units      Relevant Results  Labs  Results from last 72 hours   Lab Units 02/23/24  0418 02/22/24  0457 02/21/24  0502   WBC AUTO x10*3/uL 13.5* 14.9* 14.4*   HEMOGLOBIN g/dL 9.1* 9.3* 8.4*   HEMATOCRIT % 29.3* 30.0* 26.5*   PLATELETS AUTO x10*3/uL 215 149* 213       Results from last 72 hours   Lab Units 02/23/24  0418 02/22/24  0457 02/21/24  0502   SODIUM mmol/L 134 132* 132*   POTASSIUM mmol/L 3.8 3.8 3.6   CHLORIDE mmol/L 100 100 100   CO2 mmol/L 19* 20* 18*   BUN mg/dL 38* 28* 37*   CREATININE mg/dL 3.20* 2.70* 3.30*   GLUCOSE mg/dL 335* 256* 240*   CALCIUM mg/dL 9.0 9.0 9.0   ANION GAP mmol/L 15 12 14   EGFR mL/min/1.73m*2 15* 18* 14*           Estimated Creatinine Clearance: 18.7 mL/min (A) (by C-G formula based on SCr of 3.2 mg/dL (H)).  C-Reactive Protein   Date Value Ref Range Status   12/25/2023 5.20 (H) 0.00 - 2.00 mg/dL Final     CRP   Date Value Ref Range Status   06/23/2023 19.9 (H) 0 - 2.0 MG/DL Final     Comment:     Performed at [address] [address]    05/22/2022 1.0 0 - 2.0 MG/DL Final     Comment:     Performed at [place] [address]     Microbiology  Susceptibility data from last 14 days.  Collected Specimen Info Organism Ampicillin Aztreonam Cefazolin Cefepime Ceftazidime Ciprofloxacin Clindamycin Erythromycin Gentamicin Levofloxacin Oxacillin Piperacillin/Tazobactam Tetracycline Tobramycin   02/10/24 Tissue/Biopsy from Other (specify in comments) Proteus mirabilis S  S   S   S S  S R      Methicillin Resistant Staphylococcus aureus (MRSA)       R R   R  R      Pseudomonas aeruginosa                 02/10/24 Tissue/Biopsy from Wound/Tissue Proteus mirabilis S  S   S   S S  S R      Pseudomonas aeruginosa  S  S S S    S  S  S     Collected Specimen Info Organism Trimethoprim/Sulfamethoxazole Vancomycin   02/10/24 Tissue/Biopsy from Other (specify in comments) Proteus mirabilis S      Methicillin Resistant Staphylococcus aureus (MRSA) S S     Pseudomonas aeruginosa     02/10/24 Tissue/Biopsy from Wound/Tissue Proteus mirabilis S      Pseudomonas aeruginosa       Reviewed  Imaging  Transthoracic Echo (TTE) Limited    Result Date: 2/7/2024           [place] [address]            Phone [phone] TRANSTHORACIC ECHOCARDIOGRAM REPORT  Patient Name:      BASIA Gray Physician:    [id number] Littlejohn DO Study Date:        2/7/2024            Ordering Provider:    Shirley LITTLEJOHN MRN/PID:           [id number]            Fellow: Accession#:        [id number]        Nurse: Date of Birth/Age: 1950 / 73 years Sonographer:          Rena Talavera ACS,                                                              RDCS, FABIAN Gender:            F                   Additional Staff: Height:             157.48 cm           Admit Date: Weight:                                Admission Status:     Inpatient - Routine BSA:               m2                  Department Location:  [facility] ICU Blood Pressure: 95 /42 mmHg Study Type:    TRANSTHORACIC ECHO (TTE) LIMITED Diagnosis/ICD: Hypotension, unspecified-I95.9; Mitral valve disorder-I05.9 Indication:    Limited to assess for MS, Hypotension CPT Codes:     Echo Limited-96404; Color Doppler-09065; Doppler Limited-98339 Patient History: Pertinent History: Hypotension, hx of MV endocarditis, PAD, DM2 ESRD on Hd. Study Detail: The following Echo studies were performed: 2D, M-Mode, Doppler and               color flow. Technically challenging study due to poor acoustic               windows and L Breast implant. Unable to obtain suprasternal notch               view.  PHYSICIAN INTERPRETATION: Left Ventricle: Left ventricular systolic function is normal. There are no regional wall motion abnormalities. The left ventricular cavity size is normal. Left ventricular diastolic filling was not assessed. Left Atrium: The left atrium is moderate to severely dilated. Right Ventricle: The right ventricle is normal in size. There is normal right ventricular global systolic function. Right Atrium: The right atrium was not well visualized. Aortic Valve: The aortic valve was not assessed. Aortic valve regurgitation was not assessed. Mitral Valve: The mitral valve is abnormal. There is evidence of mild mitral valve stenosis. The doppler estimated mean and peak diastolic pressure gradients are 4.0 mmHg and 6.6 mmHg respectively. There is severe mitral annular calcification. There is no evidence of mitral valve regurgitation. Tricuspid Valve: The tricuspid valve was not assessed. Tricuspid regurgitation was not assessed. Pulmonic Valve: The pulmonic valve is not well visualized. The pulmonic valve regurgitation was not assessed. Pericardium: There is no pericardial effusion noted.  Aorta: The aortic root was not assessed.  CONCLUSIONS:  1. Left ventricular systolic function is normal.  2. The left atrium is moderate to severely dilated.  3. There is severe mitral annular calcification. QUANTITATIVE DATA SUMMARY: LV DIASTOLIC FUNCTION:                        Normal Ranges: MV Peak E:    1.23 m/s (0.7-1.2 m/s) MV Peak A:    1.12 m/s (0.42-0.7 m/s) E/A Ratio:    1.10     (1.0-2.2) MV lateral e' 0.04 m/s MV medial e'  0.07 m/s MITRAL VALVE:                      Normal Ranges: MV Vmax:    1.28 m/s (<=1.3m/s) MV peak P.6 mmHg (<5mmHg) MV mean P.0 mmHg (<48mmHg)  RIGHT VENTRICLE: RV Basal 2.97 cm  [id number] Lemus DO Electronically signed on 2024 at 3:50:21 PM  ** Final **     Transthoracic Echo (TTE) Limited    Result Date: 2024           [place] [address]            Phone [phone] TRANSTHORACIC ECHOCARDIOGRAM REPORT  Patient Name:      BASIA Gray Physician:    [id number] Lemus DO Study Date:        2024            Ordering Provider:    [id number] REBECA RESENDEZ MRN/PID:           [id number]            Fellow: Accession#:        [id number]        Nurse: Date of Birth/Age: 1950 / 73 years Sonographer:          Jennifer WALL Gender:            F                   Additional Staff: Height:            157.48 cm           Admit Date:           2024 Weight:            98.88 kg            Admission Status:     Inpatient - Routine BSA:               1.98 m2             Department Location: Blood Pressure: 59 /49 mmHg Study Type:    TRANSTHORACIC ECHO (TTE) LIMITED Diagnosis/ICD: Acute on chronic diastolic (congestive) heart failure                (CHF)-I50.33 Indication:    Acute on chronic diastolic congestive heart failure CPT Codes:     Echo Limited-64152; Color Doppler-91516;  Doppler Limited-76764 Patient History: Pertinent History: PAF Peripheralvascular disease low blood pressure HTN Breast                    Cancer Mixed Hyperlipidemia HF CVA Hypotension DMII CAD CKD. Study Detail: The following Echo studies were performed: 2D, M-Mode, Doppler and               color flow. Technically challenging study due to body habitus,               patient lying in supine position, poor acoustic windows, prominent               lung artifact and Breast Prosthesis. Definity used as a contrast               agent for endocardial border definition. Unable to obtain               suprasternal notch view.  PHYSICIAN INTERPRETATION: Left Ventricle: Left ventricular systolic function is normal, with an estimated ejection fraction of 60-65%. There are no regional wall motion abnormalities. The left ventricular cavity size is normal. Left ventricular diastolic filling was indeterminate. Left Atrium: The left atrium is moderately dilated. Right Ventricle: The right ventricle is normal in size. There is normal right ventricular global systolic function. Right Atrium: The right atrium is normal in size. Aortic Valve: The aortic valve is trileaflet. There is no evidence of aortic valve regurgitation. The peak instantaneous gradient of the aortic valve is 5.3 mmHg. Mitral Valve: The mitral valve is normal in structure. There is severe mitral annular calcification. There is trace mitral valve regurgitation. Tricuspid Valve: The tricuspid valve is structurally normal. There is mild tricuspid regurgitation. Pulmonic Valve: The pulmonic valve is not well visualized. The pulmonic valve regurgitation was not well visualized. Pericardium: There is no pericardial effusion noted. Aorta: The aortic root is normal.  CONCLUSIONS:  1. Left ventricular systolic function is normal with a 60-65% estimated ejection fraction.  2. The left atrium is moderately dilated.  3. There is severe mitral annular calcification.  QUANTITATIVE DATA SUMMARY: 2D MEASUREMENTS:                          Normal Ranges: LAs:           3.80 cm   (2.7-4.0cm) IVSd:          0.92 cm   (0.6-1.1cm) LVPWd:         1.09 cm   (0.6-1.1cm) LVIDd:         2.98 cm   (3.9-5.9cm) LVIDs:         2.12 cm LV Mass Index: 41.2 g/m2 LV % FS        28.9 % LA VOLUME:                               Normal Ranges: LA Vol A4C:        58.3 ml    (22+/-6mL/m2) LA Vol A2C:        60.9 ml LA Vol BP:         62.1 ml LA Vol Index A4C:  29.4ml/m2 LA Vol Index A2C:  30.7 ml/m2 LA Vol Index BP:   31.3 ml/m2 LA Area A4C:       20.5 cm2 LA Area A2C:       20.1 cm2 LA Major Axis A4C: 6.1 cm LA Major Axis A2C: 5.6 cm LA Volume Index:   29.3 ml/m2 LA Vol A4C:        53.1 ml LA Vol A2C:        58.4 ml LV SYSTOLIC FUNCTION BY 2D PLANIMETRY (MOD):                     Normal Ranges: EF-A4C View: 68.2 % (>=55%) EF-A2C View: 65.9 % EF-Biplane:  67.8 % AORTIC VALVE:                         Normal Ranges: AoV Vmax:      1.15 m/s (<=1.7m/s) AoV Peak P.3 mmHg (<20mmHg) LVOT Max Shimon:  1.11 m/s (<=1.1m/s) LVOT VTI:      16.20 cm LVOT Diameter: 1.90 cm  (1.8-2.4cm) AoV Area,Vmax: 2.74 cm2 (2.5-4.5cm2) PULMONIC VALVE:                         Normal Ranges: PV Accel Time: 63 msec  (>120ms) PV Max Shimon:    1.0 m/s  (0.6-0.9m/s) PV Max P.3 mmHg  [id number] Herminio Lemus DO Electronically signed on 2024 at 7:58:25 AM  ** Final **     Upper extremity venous duplex bilateral    Result Date: 2024           [address] [place] [address]            Phone [phone]  Vascular Lab Report  [facility] US UPPER EXTREMITY VENOUS DUPLEX BILATERAL Patient Name:      BASIA Gray Physician:  [id number] Mini Busby MD, RPVI Study Date:        2024           Ordering Provider:  [id number] ERI BERMAN MRN/PID:           [id number]            Fellow: Accession#:        [id number]        Technologist:        Dara Parr [facility] Date of Birth/Age: 1950 / 73 years Technologist 2: Gender:            F                   Encounter#:         [id number] Admission Status:  Inpatient           Location Performed: [facility]  Diagnosis/ICD: Right arm swelling-M79.89; Left arm swelling-M79.89 CPT Codes:     79893 Peripheral venous duplex scan for DVT complete  CONCLUSIONS:  Right Upper Venous: No evidence of acute deep vein thrombus visualized in the right upper extremity. Internal jugular vein was visualized in segments due to IV lines and bandages. Left Upper Venous: No evidence of acute deep vein thrombus visualized in the left upper extremity. Subclavian stent is noted and appears patent. There is a known occluded dialysis access noted.  Additional Findings: Technically difficult exam due to IV lines, bandages, and patient's positioning.  Imaging & Doppler Findings:  Right               Compressible Thrombus        Flow Internal Jugular        Yes        None   Spontaneous/Phasic Subclavian              Yes        None Subclavian Proximal     Yes        None   Spontaneous/Phasic Subclavian Mid          Yes        None Subclavian Distal       Yes        None   Spontaneous/Phasic Axillary                Yes        None       Pulsatile Brachial                Yes        None Cephalic                Yes        None Basilic                 Yes        None  Left                Compress Thrombus        Flow Internal Jugular      Yes      None       Pulsatile Subclavian            Yes      None Subclavian Proximal   Yes      None       Pulsatile Subclavian Mid        Yes      None       Pulsatile Subclavian Distal     Yes      None       Pulsatile Axillary              Yes      None   Spontaneous/Phasic Brachial              Yes      None Cephalic              Yes      None Basilic               Yes      None  [id number] Mini Busby MD, ALICE Electronically signed by [id number] ALICE Kelly MD on 1/25/2024 at 4:06:13  PM  ** Final **      Assessment/Plan   Septic shock-patient on pressors  Left-sided pleural effusion   Left lower lobe pneumonia-treated  Proteus urinary tract infection-treated  History of MRSA endocarditis  History of C. 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

## 2024-02-23 NOTE — NURSING NOTE
Patient with Rt chest dialysis catheters, dressing D&I, dated 2/13, dialysis in progress, RN Catarina will change dressing once dialysis is completed. Rt arm dual lumen picc, dressing intact but could use dressing change due to skin weeping, both lumens in use without difficulty. Arm is positioned under dialysis tubing and RN states she will also change dressing when dialysis is complete.

## 2024-02-24 NOTE — CARE PLAN
Problem: Respiratory  Goal: Clear secretions with interventions this shift  Outcome: Met  Goal: Minimize anxiety/maximize coping throughout shift  Outcome: Met  Goal: Minimal/no exertional discomfort or dyspnea this shift  Outcome: Met

## 2024-02-24 NOTE — CARE PLAN
Problem: Pain  Goal: My pain/discomfort is manageable  Outcome: Progressing  Flowsheets (Taken 2/23/2024 1920)  Resident's pain/discomfort is manageable:   Include resident/family/caregiver in decisions related to pain management   Offer non-pharmacological pain management interventions     Problem: Safety  Goal: Patient will be injury free during hospitalization  Outcome: Progressing  Goal: I will remain free of falls  Outcome: Progressing  Flowsheets (Taken 2/23/2024 1920)  Resident will remain free of falls: Maintain bed at position as ordered (chair height, low bed)     Problem: Daily Care  Goal: Daily care needs are met  Outcome: Progressing  Flowsheets (Taken 2/23/2024 1920)  Daily care needs are met:   Assess and monitor ability to perform self care and identify potential discharge needs   Assist patient with activities of daily living as needed   Provide mouth care     Problem: Psychosocial Needs  Goal: Demonstrates ability to cope with hospitalization/illness  Outcome: Progressing  Flowsheets (Taken 2/23/2024 1920)  Demonstrates ability to cope with hospitalization/illness:   Encourage verbalization of feelings/concerns/expectations   Include resident/family/caregiver in decisions related to psychosocial needs   Encourage participation in diversional activities   Assist resident to identify and practice own strengths and abilities   Encourage resident to set and complete small goals for self  Goal: Collaborate with me, my family, and caregiver to identify my specific goals  Outcome: Progressing  Flowsheets (Taken 1/20/2024 0335 by Isabella Miles RN)  Cultural Requests During Hospitalization: n/a  Spiritual Requests During Hospitalization: n/a     Problem: Discharge Barriers  Goal: My discharge needs are met  Outcome: Progressing  Flowsheets (Taken 2/23/2024 1920)  Resident's discharge needs are met:   Identify potential discharge barriers on admission and throughout stay   Involve resident/family/caregiver  in discharge planning process     Problem: Fall/Injury  Goal: Not fall by end of shift  Outcome: Progressing  Goal: Be free from injury by end of the shift  Outcome: Progressing  Goal: Verbalize understanding of personal risk factors for fall in the hospital  Outcome: Progressing  Goal: Verbalize understanding of risk factor reduction measures to prevent injury from fall in the home  Outcome: Progressing  Goal: Pace activities to prevent fatigue by end of the shift  Outcome: Progressing     Problem: Skin  Goal: Decreased wound size/increased tissue granulation at next dressing change  Outcome: Progressing  Flowsheets (Taken 2/23/2024 1920)  Decreased wound size/increased tissue granulation at next dressing change:   Promote sleep for wound healing   Utilize specialty bed per algorithm  Goal: Participates in plan/prevention/treatment measures  Outcome: Progressing  Flowsheets (Taken 2/23/2024 1920)  Participates in plan/prevention/treatment measures:   Discuss with provider PT/OT consult   Elevate heels  Goal: Prevent/manage excess moisture  Outcome: Progressing  Flowsheets (Taken 2/23/2024 1920)  Prevent/manage excess moisture:   Cleanse incontinence/protect with barrier cream   Moisturize dry skin   Follow provider orders for dressing changes  Goal: Prevent/minimize sheer/friction injuries  Outcome: Progressing  Flowsheets (Taken 2/23/2024 1920)  Prevent/minimize sheer/friction injuries:   Complete micro-shifts as needed if patient unable. Adjust patient position to relieve pressure points, not a full turn   Increase activity/out of bed for meals   HOB 30 degrees or less   Turn/reposition every 2 hours/use positioning/transfer devices  Goal: Promote/optimize nutrition  Outcome: Progressing  Flowsheets (Taken 2/23/2024 1920)  Promote/optimize nutrition:   Assist with feeding   Monitor/record intake including meals   Offer water/supplements/favorite foods  Goal: Promote skin healing  Outcome: Progressing  Flowsheets  (Taken 2/23/2024 1920)  Promote skin healing:   Assess skin/pad under line(s)/device(s)   Protective dressings over bony prominences     Problem: Pain  Goal: Takes deep breaths with improved pain control throughout the shift  Outcome: Progressing  Goal: Turns in bed with improved pain control throughout the shift  Outcome: Progressing  Goal: Performs ADL's with improved pain control throughout shift  Outcome: Progressing     Problem: Respiratory  Goal: Clear secretions with interventions this shift  Outcome: Progressing  Goal: Minimize anxiety/maximize coping throughout shift  Outcome: Progressing  Goal: Minimal/no exertional discomfort or dyspnea this shift  Outcome: Progressing  Goal: Patent airway maintained this shift  Outcome: Progressing  Goal: Tolerate pulmonary toileting this shift  Outcome: Progressing  Goal: Verbalize decreased shortness of breath this shift  Outcome: Progressing  Goal: Increase self care and/or family involvement in next 24 hours  Outcome: Progressing  Goal: No signs of respiratory distress (eg. Use of accessory muscles. Peds grunting)  Outcome: Progressing     Problem: Diabetes  Goal: Maintain electrolyte levels within acceptable range throughout shift  Outcome: Progressing  Goal: Maintain glucose levels >70mg/dl to <250mg/dl throughout shift  Outcome: Progressing  Goal: No changes in neurological exam by end of shift  Outcome: Progressing  Goal: Learn about and adhere to nutrition recommendations by end of shift  Outcome: Progressing  Goal: Vital signs within normal range for age by end of shift  Outcome: Progressing  Goal: Increase self care and/or family involovement by end of shift  Outcome: Progressing  Goal: Receive DSME education by end of shift  Outcome: Progressing     Problem: Discharge Planning  Goal: Discharge to home or other facility with appropriate resources  Outcome: Progressing     Problem: Chronic Conditions and Co-morbidities  Goal: Patient's chronic conditions and  co-morbidity symptoms are monitored and maintained or improved  Outcome: Progressing   The patient's goals for the shift include Visit with family.    The clinical goals for the shift include hemodynamically stable    Over the shift, the patient did not make progress toward the following goals. Barriers to progression include . Recommendations to address these barriers include .

## 2024-02-24 NOTE — NURSING NOTE
Asssumed care of pt.  Pt. States she is not feeling well this am. Pt. Has intermittent confusion. Increased levophed, see flowsheet. Pt. 90% on RA, 2L NC applied. Pt. Is O2 sat of 99% at this time,

## 2024-02-24 NOTE — PROGRESS NOTES
Ayanna Gross is a 73 y.o. female on day 36 of admission presenting with Septic shock (CMS/HCC).    Subjective   Interval History:   Patient seen and examined  Awake  Remains on pressors  No cough, chest pain or shortness of breath  No nausea vomiting no abdominal pain    Review of Systems   All other systems reviewed and are negative.      Objective   Range of Vitals (last 24 hours)  Heart Rate:  [100-118]   Temp:  [36.4 °C (97.5 °F)-36.5 °C (97.7 °F)]   Resp:  [13-27]   BP: (49-97)/(21-77)   Weight:  [102 kg (224 lb 13.9 oz)-108 kg (238 lb 12.1 oz)]   SpO2:  [77 %-100 %]   Daily Weight  02/24/24 : 102 kg (224 lb 13.9 oz)    Body mass index is 41.13 kg/m².    Physical Exam  Constitutional:       Appearance: Awake, alert  HENT:      Head: Normocephalic and atraumatic.      Nose: Nose normal.   Eyes:      Extraocular Movements: Extraocular movements intact.      Conjunctiva/sclera: Conjunctivae normal.   Cardiovascular:      Heart sounds: Normal heart sounds, S1 normal and S2 normal.   Pulmonary:      Breath sounds: Decreased breath sounds present.   Abdominal:      General: Bowel sounds are normal.      Palpations: Abdomen is soft.   Musculoskeletal:      Cervical back: Normal range of motion and neck supple.   Skin:     Comments: Bilateral posterior heel eschar   Neurological:      Mental Status: She is awake, alert       Antibiotics  aspirin tablet 325 mg  acetaminophen (Tylenol) tablet 650 mg  cefepime (Maxipime) 1 g in dextrose 5 % 50 mL IV  sodium chloride 0.9 % bolus 2,229 mL  apixaban (Eliquis) tablet 2.5 mg  escitalopram (Lexapro) tablet 10 mg  febuxostat (Uloric) tablet 40 mg  fenofibrate (Triglide) tablet 160 mg  gabapentin (Neurontin) capsule 100 mg  midodrine (Proamatine) tablet 15 mg  nystatin (Mycostatin) 100,000 unit/gram powder  oxyCODONE-acetaminophen (Percocet) 5-325 mg per tablet 1 tablet  simvastatin (Zocor) tablet 20 mg  piperacillin-tazobactam-dextrose (Zosyn) IV 2.25 g  vancomycin (Vancocin)  capsule 125 mg  oxygen (O2) therapy  dextrose 50 % injection 25 g  glucagon (Glucagen) injection 1 mg  dextrose 10 % in water (D10W) infusion  insulin lispro (HumaLOG) injection 0-10 Units  nystatin (Mycostatin) 100,000 unit/gram powder 1 Application  vancomycin-diluent combo no.1 (Xellia) IVPB 1,750 mg  piperacillin-tazobactam-dextrose (Zosyn) IV 2.25 g  sodium chloride 0.9 % bolus 500 mL  norepinephrine (Levophed) 8 mg in dextrose 5% 250 mL (0.032 mg/mL) infusion (premix)  vancomycin (Vancocin) placeholder  pantoprazole (ProtoNix) EC tablet 40 mg  pantoprazole (ProtoNix) injection 40 mg  sennosides-docusate sodium (Judy-Colace) 8.6-50 mg per tablet 1 tablet  doxycycline (Vibramycin) in dextrose 5 % in water (D5W) 100 mL  mg  LORazepam (Ativan) injection 2 mg  magnesium sulfate IV 2 g  zinc oxide 20 % ointment 1 Application  nystatin (Mycostatin) cream  norepinephrine (Levophed) 8 mg in dextrose 5% 250 mL (0.032 mg/mL) infusion (premix)  heparin 1,000 unit/mL injection 2,000 Units  heparin 1,000 unit/mL injection 2,000 Units  nystatin (Mycostatin) ointment  acetaminophen (Tylenol) oral liquid 1,000 mg  albumin human 25 % solution 12.5 g  perflutren lipid microspheres (Definity) injection 0.5-10 mL of dilution  sulfur hexafluoride microsphr (Lumason) injection 24.28 mg  perflutren protein A microsphere (Optison) injection 0.5 mL  ipratropium-albuteroL (Duo-Neb) 0.5-2.5 mg/3 mL nebulizer solution 3 mL  sodium chloride 3 % nebulizer solution 3 mL  vancomycin-diluent combo no.1 (Xellia) IVPB 750 mg  sennosides-docusate sodium (Judy-Colace) 8.6-50 mg per tablet 1 tablet  acetaminophen (Tylenol) tablet 650 mg  doxycycline (Vibramycin) capsule 100 mg  magnesium oxide (Mag-Ox) tablet 400 mg  vasopressin (Vasostrict) 0.2 unit/mL infusion  norepinephrine (Levophed) 8 mg in dextrose 5% 250 mL (0.032 mg/mL) infusion (premix)  heparin 1,000 unit/mL injection 2,000 Units  heparin 1,000 unit/mL injection 2,000  Units  albumin human 25 % solution 12.5 g  vancomycin (Xellia) 1 g in 200 mL (Xellia) IVPB 1 g  ipratropium-albuteroL (Duo-Neb) 0.5-2.5 mg/3 mL nebulizer solution 3 mL  sodium chloride 3 % nebulizer solution 3 mL  albumin human 5 % infusion 12.5 g  heparin 1,000 unit/mL injection 2,000 Units  heparin 1,000 unit/mL injection 2,000 Units  vancomycin (Vancocin) capsule 125 mg  albumin human 25 % solution 12.5 g  heparin 1,000 unit/mL injection 2,000 Units  heparin 1,000 unit/mL injection 2,000 Units  ipratropium-albuteroL (Duo-Neb) 0.5-2.5 mg/3 mL nebulizer solution 3 mL  lidocaine (Xylocaine) 10 mg/mL (1 %) injection 50 mg  sodium phosphate 15 mmol in sodium chloride 0.9% 250 mL IV  sod phos di, mono-K phos mono (K Phos Neutral) tablet 250 mg  potassium, sodium phosphates (Phos-NaK) 280-160-250 mg packet 1 packet  doxycycline (Vibramycin) capsule 100 mg  fludrocortisone (Florinef) tablet 0.1 mg  gabapentin (Neurontin) capsule 100 mg  acetaminophen (Tylenol) tablet 1,000 mg  benzocaine-menthoL (Dermoplast) topical spray  vancomycin (Vancocin) capsule 125 mg  heparin 1,000 unit/mL injection 2,000 Units  heparin 1,000 unit/mL injection 2,000 Units  magnesium sulfate IV 2 g  albumin human 25 % solution 12.5 g  promethazine (Phenergan) injection 12.5 mg  ondansetron (Zofran) injection 4 mg  lidocaine (Xylocaine) 10 mg/mL (1 %) injection 50 mg  alteplase (Cathflo Activase) injection 2 mg  acetaminophen (Tylenol) tablet 650 mg  psyllium (Metamucil) 3.4 gram packet 1 packet  perflutren lipid microspheres (Definity) injection 0.5-10 mL of dilution  sulfur hexafluoride microsphr (Lumason) injection 24.28 mg  perflutren protein A microsphere (Optison) injection 0.5 mL  epoetin di-epbx (Retacrit) injection 10,000 Units  sulfur hexafluoride microsphr (Lumason) injection 24.28 mg  perflutren lipid microspheres (Definity) injection 0.5-10 mL of dilution  perflutren lipid microspheres (Definity) injection 0.5-10 mL of  dilution  sulfur hexafluoride microsphr (Lumason) injection 24.28 mg  perflutren protein A microsphere (Optison) injection 0.5 mL  ammonium lactate (Lac-Hydrin) 12 % lotion 1 Application  norepinephrine (Levophed) 8 mg in dextrose 5% 250 mL (0.032 mg/mL) infusion (premix)  fludrocortisone (Florinef) tablet 0.1 mg  albumin human 25 % solution 25 g  albumin human 25 % solution 25 g  meropenem (Merrem) 500 mg in sodium chloride 0.9 % 100 mL IV  potassium chloride 20 mEq in 100 mL IV premix  potassium chloride 20 mEq in 100 mL IV premix  magnesium sulfate IV 2 g  cefepime (Maxipime) 1 g in dextrose 5 % 50 mL IV  vancomycin-diluent combo no.1 (Xellia) IVPB 750 mg  albumin human 25 % solution 12.5 g  vancomycin (Xellia) 1 g in 200 mL (Xellia) IVPB 1 g  vancomycin (Vancocin) placeholder  vancomycin (Xellia) 1 g in 200 mL (Xellia) IVPB 1 g  midodrine (Proamatine) tablet 20 mg  vancomycin-diluent combo no.1 (Xellia) IVPB 750 mg  magnesium sulfate IV 2 g  heparin flush 100 unit/mL syringe 160 Units  heparin flush 100 unit/mL syringe 160 Units  vancomycin (Vancocin) 500 mg in dextrose 5 % 100 mL IV  albumin human 25 % solution 12.5 g      Relevant Results  Labs  Results from last 72 hours   Lab Units 02/24/24  0353 02/23/24  0418 02/22/24  0457   WBC AUTO x10*3/uL 13.3* 13.5* 14.9*   HEMOGLOBIN g/dL 9.0* 9.1* 9.3*   HEMATOCRIT % 29.1* 29.3* 30.0*   PLATELETS AUTO x10*3/uL 209 215 149*     Results from last 72 hours   Lab Units 02/24/24  0353 02/23/24  0418 02/22/24  0457   SODIUM mmol/L 129* 134 132*   POTASSIUM mmol/L 3.7 3.8 3.8   CHLORIDE mmol/L 96* 100 100   CO2 mmol/L 19* 19* 20*   BUN mg/dL 26* 38* 28*   CREATININE mg/dL 2.50* 3.20* 2.70*   GLUCOSE mg/dL 281* 335* 256*   CALCIUM mg/dL 8.9 9.0 9.0   ANION GAP mmol/L 14 15 12   EGFR mL/min/1.73m*2 20* 15* 18*         Estimated Creatinine Clearance: 23.1 mL/min (A) (by C-G formula based on SCr of 2.5 mg/dL (H)).  C-Reactive Protein   Date Value Ref Range Status   12/25/2023  5.20 (H) 0.00 - 2.00 mg/dL Final     CRP   Date Value Ref Range Status   06/23/2023 19.9 (H) 0 - 2.0 MG/DL Final     Comment:     Performed at [place] [address]   05/22/2022 1.0 0 - 2.0 MG/DL Final     Comment:     Performed at [place] [facility] [address]     Microbiology  Susceptibility data from last 14 days.  Collected Specimen Info Organism Ampicillin Aztreonam Cefazolin Cefepime Ceftazidime Ciprofloxacin Clindamycin Erythromycin Gentamicin Levofloxacin Oxacillin Piperacillin/Tazobactam Tetracycline Tobramycin   02/10/24 Tissue/Biopsy from Other (specify in comments) Proteus mirabilis S  S   S   S S  S R      Methicillin Resistant Staphylococcus aureus (MRSA)       R R   R  R      Pseudomonas aeruginosa                 02/10/24 Tissue/Biopsy from Wound/Tissue Proteus mirabilis S  S   S   S S  S R      Pseudomonas aeruginosa  S  S S S    S  S  S     Collected Specimen Info Organism Trimethoprim/Sulfamethoxazole Vancomycin   02/10/24 Tissue/Biopsy from Other (specify in comments) Proteus mirabilis S      Methicillin Resistant Staphylococcus aureus (MRSA) S S     Pseudomonas aeruginosa     02/10/24 Tissue/Biopsy from Wound/Tissue Proteus mirabilis S      Pseudomonas aeruginosa       Imaging  Transthoracic Echo (TTE) Limited    Result Date: 2/7/2024           [place] [address]            Phone [phone] TRANSTHORACIC ECHOCARDIOGRAM REPORT  Patient Name:      BASIA Gray Physician:    [id number] Littlejohn DO Study Date:        2/7/2024            Ordering Provider:    [id number] LITTLEJOHN MRN/PID:           [id number]            Fellow: Accession#:        [id number]        Nurse: Date of Birth/Age: 1950 / 73 years Sonographer:          Rena                                                               Sadaf ACS,                                                              RDCS, FASE Gender:            F                   Additional Staff: Height:            157.48 cm           Admit Date: Weight:                                Admission Status:     Inpatient - Routine BSA:               m2                  Department Location:  [facility] ICU Blood Pressure: 95 /42 mmHg Study Type:    TRANSTHORACIC ECHO (TTE) LIMITED Diagnosis/ICD: Hypotension, unspecified-I95.9; Mitral valve disorder-I05.9 Indication:    Limited to assess for MS, Hypotension CPT Codes:     Echo Limited-33108; Color Doppler-82513; Doppler Limited-26468 Patient History: Pertinent History: Hypotension, hx of MV endocarditis, PAD, DM2 ESRD on Hd. Study Detail: The following Echo studies were performed: 2D, M-Mode, Doppler and               color flow. Technically challenging study due to poor acoustic               windows and L Breast implant. Unable to obtain suprasternal notch               view.  PHYSICIAN INTERPRETATION: Left Ventricle: Left ventricular systolic function is normal. There are no regional wall motion abnormalities. The left ventricular cavity size is normal. Left ventricular diastolic filling was not assessed. Left Atrium: The left atrium is moderate to severely dilated. Right Ventricle: The right ventricle is normal in size. There is normal right ventricular global systolic function. Right Atrium: The right atrium was not well visualized. Aortic Valve: The aortic valve was not assessed. Aortic valve regurgitation was not assessed. Mitral Valve: The mitral valve is abnormal. There is evidence of mild mitral valve stenosis. The doppler estimated mean and peak diastolic pressure gradients are 4.0 mmHg and 6.6 mmHg respectively. There is severe mitral annular calcification. There is no evidence of mitral valve regurgitation. Tricuspid Valve: The tricuspid valve was not assessed. Tricuspid regurgitation  was not assessed. Pulmonic Valve: The pulmonic valve is not well visualized. The pulmonic valve regurgitation was not assessed. Pericardium: There is no pericardial effusion noted. Aorta: The aortic root was not assessed.  CONCLUSIONS:  1. Left ventricular systolic function is normal.  2. The left atrium is moderate to severely dilated.  3. There is severe mitral annular calcification. QUANTITATIVE DATA SUMMARY: LV DIASTOLIC FUNCTION:                        Normal Ranges: MV Peak E:    1.23 m/s (0.7-1.2 m/s) MV Peak A:    1.12 m/s (0.42-0.7 m/s) E/A Ratio:    1.10     (1.0-2.2) MV lateral e' 0.04 m/s MV medial e'  0.07 m/s MITRAL VALVE:                      Normal Ranges: MV Vmax:    1.28 m/s (<=1.3m/s) MV peak P.6 mmHg (<5mmHg) MV mean P.0 mmHg (<48mmHg)  RIGHT VENTRICLE: RV Basal 2.97 cm  [id number] Herminio Lemus DO Electronically signed on 2024 at 3:50:21 PM  ** Final **     Transthoracic Echo (TTE) Limited    Result Date: 2024           [place] [address]            Phone [phone] TRANSTHORACIC ECHOCARDIOGRAM REPORT  Patient Name:      BASIA GUAN GARY Gray Physician:    [id number] Leums DO Study Date:        2024            Ordering Provider:    [id number] REBECA RESENDEZ MRN/PID:           [id number]            Fellow: Accession#:        [id number]        Nurse: Date of Birth/Age: 1950 / 73 years Sonographer:          Jennifer WALL Gender:            F                   Additional Staff: Height:            157.48 cm           Admit Date:           2024 Weight:            98.88 kg            Admission Status:     Inpatient - Routine BSA:               1.98 m2             Department Location: Blood Pressure: 59 /49 mmHg Study Type:    TRANSTHORACIC ECHO (TTE) LIMITED Diagnosis/ICD: Acute on chronic  diastolic (congestive) heart failure                (CHF)-I50.33 Indication:    Acute on chronic diastolic congestive heart failure CPT Codes:     Echo Limited-81358; Color Doppler-43103; Doppler Limited-84019 Patient History: Pertinent History: PAF Peripheralvascular disease low blood pressure HTN Breast                    Cancer Mixed Hyperlipidemia HF CVA Hypotension DMII CAD CKD. Study Detail: The following Echo studies were performed: 2D, M-Mode, Doppler and               color flow. Technically challenging study due to body habitus,               patient lying in supine position, poor acoustic windows, prominent               lung artifact and Breast Prosthesis. Definity used as a contrast               agent for endocardial border definition. Unable to obtain               suprasternal notch view.  PHYSICIAN INTERPRETATION: Left Ventricle: Left ventricular systolic function is normal, with an estimated ejection fraction of 60-65%. There are no regional wall motion abnormalities. The left ventricular cavity size is normal. Left ventricular diastolic filling was indeterminate. Left Atrium: The left atrium is moderately dilated. Right Ventricle: The right ventricle is normal in size. There is normal right ventricular global systolic function. Right Atrium: The right atrium is normal in size. Aortic Valve: The aortic valve is trileaflet. There is no evidence of aortic valve regurgitation. The peak instantaneous gradient of the aortic valve is 5.3 mmHg. Mitral Valve: The mitral valve is normal in structure. There is severe mitral annular calcification. There is trace mitral valve regurgitation. Tricuspid Valve: The tricuspid valve is structurally normal. There is mild tricuspid regurgitation. Pulmonic Valve: The pulmonic valve is not well visualized. The pulmonic valve regurgitation was not well visualized. Pericardium: There is no pericardial effusion noted. Aorta: The aortic root is normal.  CONCLUSIONS:  1.  Left ventricular systolic function is normal with a 60-65% estimated ejection fraction.  2. The left atrium is moderately dilated.  3. There is severe mitral annular calcification. QUANTITATIVE DATA SUMMARY: 2D MEASUREMENTS:                          Normal Ranges: LAs:           3.80 cm   (2.7-4.0cm) IVSd:          0.92 cm   (0.6-1.1cm) LVPWd:         1.09 cm   (0.6-1.1cm) LVIDd:         2.98 cm   (3.9-5.9cm) LVIDs:         2.12 cm LV Mass Index: 41.2 g/m2 LV % FS        28.9 % LA VOLUME:                               Normal Ranges: LA Vol A4C:        58.3 ml    (22+/-6mL/m2) LA Vol A2C:        60.9 ml LA Vol BP:         62.1 ml LA Vol Index A4C:  29.4ml/m2 LA Vol Index A2C:  30.7 ml/m2 LA Vol Index BP:   31.3 ml/m2 LA Area A4C:       20.5 cm2 LA Area A2C:       20.1 cm2 LA Major Axis A4C: 6.1 cm LA Major Axis A2C: 5.6 cm LA Volume Index:   29.3 ml/m2 LA Vol A4C:        53.1 ml LA Vol A2C:        58.4 ml LV SYSTOLIC FUNCTION BY 2D PLANIMETRY (MOD):                     Normal Ranges: EF-A4C View: 68.2 % (>=55%) EF-A2C View: 65.9 % EF-Biplane:  67.8 % AORTIC VALVE:                         Normal Ranges: AoV Vmax:      1.15 m/s (<=1.7m/s) AoV Peak P.3 mmHg (<20mmHg) LVOT Max Shimon:  1.11 m/s (<=1.1m/s) LVOT VTI:      16.20 cm LVOT Diameter: 1.90 cm  (1.8-2.4cm) AoV Area,Vmax: 2.74 cm2 (2.5-4.5cm2) PULMONIC VALVE:                         Normal Ranges: PV Accel Time: 63 msec  (>120ms) PV Max Shimon:    1.0 m/s  (0.6-0.9m/s) PV Max P.3 mmHg  [id number] Herminio Lemus DO Electronically signed on 2024 at 7:58:25 AM  ** Final **      Assessment/Plan   Septic shock-patient on pressors  Left-sided pleural effusion   Left lower lobe pneumonia-treated  Proteus urinary tract infection-treated  History of MRSA endocarditis  History of C. 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

## 2024-02-25 NOTE — CARE PLAN
The patient's goals for the shift include Visit with family.    The clinical goals for the shift include Hemodynamic Stablility      Problem: Pain  Goal: My pain/discomfort is manageable  Outcome: Progressing     Problem: Safety  Goal: Patient will be injury free during hospitalization  Outcome: Progressing  Goal: I will remain free of falls  Outcome: Progressing     Problem: Daily Care  Goal: Daily care needs are met  Outcome: Progressing     Problem: Psychosocial Needs  Goal: Demonstrates ability to cope with hospitalization/illness  Outcome: Progressing  Goal: Collaborate with me, my family, and caregiver to identify my specific goals  Outcome: Progressing     Problem: Discharge Barriers  Goal: My discharge needs are met  Outcome: Progressing     Problem: Fall/Injury  Goal: Not fall by end of shift  Outcome: Progressing  Goal: Be free from injury by end of the shift  Outcome: Progressing  Goal: Verbalize understanding of personal risk factors for fall in the hospital  Outcome: Progressing  Goal: Verbalize understanding of risk factor reduction measures to prevent injury from fall in the home  Outcome: Progressing  Goal: Pace activities to prevent fatigue by end of the shift  Outcome: Progressing     Problem: Skin  Goal: Decreased wound size/increased tissue granulation at next dressing change  Outcome: Progressing  Flowsheets (Taken 2/25/2024 1824)  Decreased wound size/increased tissue granulation at next dressing change:   Promote sleep for wound healing   Utilize specialty bed per algorithm   Protective dressings over bony prominences  Goal: Participates in plan/prevention/treatment measures  Outcome: Progressing  Flowsheets (Taken 2/25/2024 1824)  Participates in plan/prevention/treatment measures:   Discuss with provider PT/OT consult   Elevate heels  Goal: Prevent/manage excess moisture  Outcome: Progressing  Flowsheets (Taken 2/25/2024 1824)  Prevent/manage excess moisture:   Cleanse incontinence/protect  with barrier cream   Moisturize dry skin   Monitor for/manage infection if present   Follow provider orders for dressing changes  Goal: Prevent/minimize sheer/friction injuries  Outcome: Progressing  Flowsheets (Taken 2/25/2024 1824)  Prevent/minimize sheer/friction injuries:   Increase activity/out of bed for meals   Use pull sheet   HOB 30 degrees or less   Turn/reposition every 2 hours/use positioning/transfer devices   Utilize specialty bed per algorithm  Goal: Promote/optimize nutrition  Outcome: Progressing  Flowsheets (Taken 2/25/2024 1824)  Promote/optimize nutrition:   Monitor/record intake including meals   Offer water/supplements/favorite foods   Consume > 50% meals/supplements  Goal: Promote skin healing  Outcome: Progressing  Flowsheets (Taken 2/25/2024 1824)  Promote skin healing:   Assess skin/pad under line(s)/device(s)   Protective dressings over bony prominences   Turn/reposition every 2 hours/use positioning/transfer devices   Rotate device position/do not position patient on device   Ensure correct size (line/device) and apply per  instructions     Problem: Pain  Goal: Takes deep breaths with improved pain control throughout the shift  Outcome: Progressing  Goal: Turns in bed with improved pain control throughout the shift  Outcome: Progressing  Goal: Performs ADL's with improved pain control throughout shift  Outcome: Progressing     Problem: Respiratory  Goal: Patent airway maintained this shift  Outcome: Progressing  Goal: Tolerate pulmonary toileting this shift  Outcome: Progressing  Goal: Verbalize decreased shortness of breath this shift  Outcome: Progressing  Goal: Increase self care and/or family involvement in next 24 hours  Outcome: Progressing  Goal: No signs of respiratory distress (eg. Use of accessory muscles. Peds grunting)  Outcome: Progressing     Problem: Diabetes  Goal: Maintain electrolyte levels within acceptable range throughout shift  Outcome: Progressing  Goal:  Maintain glucose levels >70mg/dl to <250mg/dl throughout shift  Outcome: Progressing  Goal: No changes in neurological exam by end of shift  Outcome: Progressing  Goal: Learn about and adhere to nutrition recommendations by end of shift  Outcome: Progressing  Goal: Vital signs within normal range for age by end of shift  Outcome: Progressing  Goal: Increase self care and/or family involovement by end of shift  Outcome: Progressing  Goal: Receive DSME education by end of shift  Outcome: Progressing     Problem: Discharge Planning  Goal: Discharge to home or other facility with appropriate resources  Outcome: Progressing     Problem: Chronic Conditions and Co-morbidities  Goal: Patient's chronic conditions and co-morbidity symptoms are monitored and maintained or improved  Outcome: Progressing

## 2024-02-25 NOTE — CARE PLAN
Problem: Respiratory  Goal: Patent airway maintained this shift  Outcome: Progressing  Goal: Tolerate pulmonary toileting this shift  Outcome: Progressing  Goal: Verbalize decreased shortness of breath this shift  Outcome: Progressing  Goal: Increase self care and/or family involvement in next 24 hours  Outcome: Progressing

## 2024-02-25 NOTE — PROGRESS NOTES
Ayanna Gross is a 73 y.o. female on day 37 of admission presenting with Septic shock (CMS/HCC).      Subjective   No acute events, clinically the same, remains on pressors but asymptomatic       Objective          Vitals 24HR  Heart Rate:  []   Temp:  [36.1 °C (97 °F)-36.4 °C (97.5 °F)]   Resp:  [14-32]   BP: ()/()   Weight:  [102 kg (224 lb 13.9 oz)-108 kg (238 lb 1.6 oz)]   SpO2:  [91 %-100 %]         Intake/Output last 3 Shifts:    Intake/Output Summary (Last 24 hours) at 2/25/2024 1221  Last data filed at 2/25/2024 0800  Gross per 24 hour   Intake 466.49 ml   Output 600 ml   Net -133.51 ml       Physical Exam    Relevant Results               Assessment/Plan      73-year-old female with end-stage renal disease  Shock presumably septic  Edema    -Will plan for dialysis tomorrow on pressors.  Ultrafilter up to 2 L with pressor support.      Ernesto Andrade MD

## 2024-02-25 NOTE — PROGRESS NOTES
Critical Care Daily Progress    Patient is a 73 y.o. female admitted on 1/19/2024  1:03 PM now in the ICU with persistent hypotension requiring vasopressors  Pertinent Medical History: She is known with ESRD on iHD for at least four years.  She has been in the ICU with hemodynamic instability requiring norepinephrine as a pressor.  The etiology of hemodynamic instability is believed to be related to septic shock    Interval History: No changes overnight.  Mental status is unchanged.  She is feeling well without any complaints.  In general she is feeling better.  She is enjoying some company from family.  Denies chest pain.  At baseline she stated that at home she was mobile before this illness however, her mobility has been quite limited    Medications  Scheduled:   albumin human, 12.5 g, intravenous, Daily  apixaban, 2.5 mg, oral, BID  cefepime, 1 g, intravenous, Nightly  doxycylcine, 100 mg, oral, Daily  epoetin di or biosimilar, 10,000 Units, intravenous, Every Mon/Wed/Fri  escitalopram, 10 mg, oral, Nightly  fludrocortisone, 0.1 mg, oral, TID  gabapentin, 100 mg, oral, TID  heparin, 2,000 Units, intra-catheter, After Dialysis  heparin, 2,000 Units, intra-catheter, After Dialysis  heparin, 2,000 Units, intra-catheter, After Dialysis  heparin, 2,000 Units, intra-catheter, After Dialysis  insulin lispro, 0-10 Units, subcutaneous, TID with meals  ipratropium-albuteroL, 3 mL, nebulization, q6h while awake  lidocaine, 5 mL, infiltration, Once  midodrine, 20 mg, oral, TID with meals  nystatin, , Topical, BID  nystatin, , Topical, BID  pantoprazole, 40 mg, oral, Daily   Or  pantoprazole, 40 mg, intravenous, Daily  perflutren lipid microspheres, 0.5-10 mL of dilution, intravenous, Once in imaging  perflutren protein A microsphere, 0.5 mL, intravenous, Once in imaging  psyllium, 1 packet, oral, BID  sulfur hexafluoride microsphr, 2 mL, intravenous, Once in imaging  sulfur hexafluoride microsphr, 2 mL, intravenous, Once  in imaging  vancomycin, 125 mg, oral, BID  zinc oxide, 1 Application, Topical, BID   Continuous:   norepinephrine, 0.01-3 mcg/kg/min, Last Rate: 0.04 mcg/kg/min (02/25/24 1824)  PRN:   PRN medications: acetaminophen, albumin human, alteplase, ammonium lactate, benzocaine-menthoL, dextrose 10 % in water (D10W), dextrose, glucagon, heparin flush, heparin flush, ondansetron, oxygen, sennosides-docusate sodium, vancomycin  ================================================================================  Objective   Vitals:  Most Recent:  Vitals:    02/25/24 1815   BP: (!) 67/51   Pulse: (!) 118   Resp: 18   Temp:    SpO2: 94%     24hr Min/Max:  Temp  Min: 36.1 °C (97 °F)  Max: 36.4 °C (97.5 °F)  Pulse  Min: 82  Max: 118  BP  Min: 38/24  Max: 131/111  Resp  Min: 14  Max: 32  SpO2  Min: 91 %  Max: 100 %  LDA:  CVC Double lumen Tunneled Right Subclavian (Active)   Earliest Known Present: 12/06/23   Lumen Type: Double lumen  CVC Type: Tunneled  Description (optional): Permacath  Orientation: Right  Location: Subclavian   Number of days: 81       PICC - Adult 02/06/24 Double lumen Right Brachial vein (Active)   Placement Date/Time: 02/06/24 1028   Hand Hygiene Completed: Yes  Catheter Time Out Checklist Completed: Yes  Size (Fr): 5  Lumen Type: Double lumen  Catheter to Vein Ratio Less Than 50%: Yes  Total Length (cm): 37 cm  External Length (cm): 28 cm  Raymon...   Number of days: 19       Rectal Tube With balloon (Active)   Placement Date/Time: 01/19/24 1830   Placed by: Peri Rosario RN  Hand Hygiene Completed: Yes  Type: With balloon   Number of days: 36       Fluid balance  Intake/Output Summary (Last 24 hours) at 2/25/2024 1825  Last data filed at 2/25/2024 1824  Gross per 24 hour   Intake 373.6 ml   Output 1100 ml   Net -726.4 ml     Physical Exam:   GENERAL: No respiratory distress  NECK: no JVD, midline trachea without stridor. Tunneled R internal jugular Dialysis catheter. Not tender. No erythema  LUNGS: small lung  fields.  CARDIAC: Regular rate and rhythm.  Radial pulses are very faint.  Brachial pulses are very strong in both upper extremities.  The right brachial pulse is stronger than the left.  Signs of old fistula in the left arm.  Manual blood pressure obtained from the right arm is at 102/64 as heard by Doppler.  Unfortunately this does not correlate with any automated measurement  ABDOMEN: Abdomen soft, non-tender. Non-distended. BS normal.  EXTREMITIES: edema in upper extremities, ulcerations in the feet and ankles refer to photo.  PICC line in the right arm  NEURO: Generalized weakness.  Upper extremity is weaker than lower.  She follows commands.  She is oriented to self family and place not necessarily time.  She carries a very normal conversation and denies pain  Lab/Radiology/Diagnostic Review:  Results from last 72 hours   Lab Units 02/25/24  0421 02/24/24  0353 02/23/24  0418   WBC AUTO x10*3/uL 11.5* 13.3* 13.5*   HEMOGLOBIN g/dL 8.8* 9.0* 9.1*   HEMATOCRIT % 28.3* 29.1* 29.3*   PLATELETS AUTO x10*3/uL 225 209 215   SODIUM mmol/L 130* 129* 134   POTASSIUM mmol/L 3.8 3.7 3.8   CHLORIDE mmol/L 97 96* 100   CO2 mmol/L 17* 19* 19*   BUN mg/dL 31* 26* 38*   CREATININE mg/dL 3.00* 2.50* 3.20*   GLUCOSE mg/dL 233* 281* 335*   CALCIUM mg/dL 9.2 8.9 9.0   MAGNESIUM mg/dL 2.00 1.60 1.70   Safety  This patient has a central line   Reason for the central line remaining today? Dialysis/Hemapheresis  ================================================================================  Assessment  Hemodynamic instability from admission related to sepsis seem to have resolved.  The persistent hypotension is concerning for difficulty with noninvasive blood pressure measurements given the severe peripheral vascular disease that she has and the prior fistulas.  This is a very challenging problem   End-stage renal disease on intermittent hemodialysis.  Nephrology following and directing dialysis therapy  Diabetes with slight  improvement in glycemic control  Anemia on presentation has stabilized  Management Plans  Maintaining current treatment  Midodrine is at the highest dose  Checking blood pressure manually prior to any escalation of norepinephrine  Optimize glycemic control  Increase physical therapy  I have personally asked the nursing staff to give Ms. Gross tasks to increase her upper extremity strength.  She is in agreement  Further discussions with patient, family,  and leadership to determine the optimal disposition strategy.  Intensive rehabilitation is needed  Disclosure: Parts of this note may have been scribed or generated using voice dictation software, Dragon.  Homophonic errors may exist.  Please contact me directly if clarification is needed  ---------------------------------------  this critically ill patient continues to be at-risk for deterioration / failure due to the above  mentioned dysfunctional unstable organ systems.  I have reviewed, evaluated, identified and managed all critical medical problems.  Assessment, impressions and plans are reflected in the note above as well as the orders.  Critical care time is spent at bedside includes review of diagnostic tests, labs, and radiographs, serial assessments and management of hemodynamics, respiratory status, ventilation and coordination of care.  Teaching and any separately billable procedures are not included in the time calculation.    Billing Provider Critical Care Time:  33 minutes  Michelle Almazan MD  02/25/24  ----------------------------------------

## 2024-02-25 NOTE — PROGRESS NOTES
"Ayanna Gross is a 73 y.o. female on day 36 of admission presenting with Septic shock (CMS/HCC).    73 YOF with h/o Afib, ESRD, C. Diff infection, recurrent pleural effusions, and MRSA bacteremia with MV vegetation who initially presented with anemia, fever and worsening sacral and lower extremities wounds. In the ED found to be hypotensive, anemic. Received IVF, antibiotics and admitted to the ICU with the diagnosis of septic shock. Course complicated by AMS, due to worsening shock, requiring vasopressors. Since then patient remains hypotensive on Norepinephrine at variable doses. Had thoracentesis done that showed transudative effusion. Also vascular, general surgery and podiatry were consulted for pressure ulcers. Also started on Midodrine with the dose increasing to 15 mg TID. Also received several courses of antibiotics. Given stable need for Levophed, attempt made to transfer to [facility], however, denied by insurance  Physical Exam  GENERAL: chronically ill. No respiratory distress  Neck: R tunneled {injection or internal jugular dialysis catheter  LUNGS: small lung fields  CARDIAC: RRR with frequent ectopy  ABDOMEN: Abdomen soft, non-tender. Non-distended. BS normal.  EXTREMITIES: R PICC line. edema in all extremities  NEURO: able to carry a conversation. Oriented to self and family, maybe place but not time. Upper extremities stronger than lower  SKIN: multiple vascular ulcerations and pressure sores   PSYCH: Normal affect  Last Recorded Vitals  Blood pressure (!) 89/47, pulse 110, temperature 36.2 °C (97.2 °F), temperature source Temporal, resp. rate 22, height 1.575 m (5' 2\"), weight 102 kg (224 lb 13.9 oz), SpO2 94 %.    Intake/Output last 3 Shifts:  I/O last 3 completed shifts:  In: 2115 (22.7 mL/kg) [P.O.:1080; I.V.:835 (9 mL/kg); IV Piggyback:200]  Out: 1500 (16.1 mL/kg) [Other:1500]  Dosing Weight: 93 kg     Assessment/Plan   Principal Problem:    Septic shock (CMS/HCC) - maintaining antibiotics  Active " Problems:    Pneumonia - resolved    Low blood pressure -   given her severe peripheral vascular disease the non-invasive BP measurements dont seem accurate. Unfortunately, risk of placing an arterial line may outweigh the benefit    End stage renal disease (CMS/HCC)    Anemia due to blood loss, acute - resolved  Disposition is a problem - she is in need for intensive rehab. Prognosis is very poor  I spent 40 minutes in the professional and overall care of this patient.    Michelle Almazan MD

## 2024-02-25 NOTE — CARE PLAN
Problem: Pain  Goal: My pain/discomfort is manageable  Outcome: Progressing  Flowsheets (Taken 2/24/2024 1907)  Resident's pain/discomfort is manageable:   Include resident/family/caregiver in decisions related to pain management   Offer non-pharmacological pain management interventions     Problem: Safety  Goal: Patient will be injury free during hospitalization  Outcome: Progressing  Goal: I will remain free of falls  Outcome: Progressing  Flowsheets (Taken 2/24/2024 1907)  Resident will remain free of falls:   Apply bed/chair alarms as appropriate   Assist with toileting as orderd   Visual checks per facility policy     Problem: Daily Care  Goal: Daily care needs are met  Outcome: Progressing  Flowsheets (Taken 2/24/2024 1907)  Daily care needs are met:   Assess and monitor ability to perform self care and identify potential discharge needs   Assist patient with activities of daily living as needed   Provide mouth care   Assess skin integrity/risk for skin breakdown   Encourage independent activity per ability     Problem: Psychosocial Needs  Goal: Demonstrates ability to cope with hospitalization/illness  Outcome: Progressing  Flowsheets (Taken 2/24/2024 1907)  Demonstrates ability to cope with hospitalization/illness: Encourage verbalization of feelings/concerns/expectations  Goal: Collaborate with me, my family, and caregiver to identify my specific goals  Outcome: Progressing  Flowsheets (Taken 1/20/2024 0335 by Isabella Miles RN)  Cultural Requests During Hospitalization: n/a  Spiritual Requests During Hospitalization: n/a     Problem: Discharge Barriers  Goal: My discharge needs are met  Outcome: Progressing  Flowsheets (Taken 2/24/2024 1907)  Resident's discharge needs are met:   Identify potential discharge barriers on admission and throughout stay   Involve resident/family/caregiver in discharge planning process     Problem: Fall/Injury  Goal: Not fall by end of shift  Outcome: Progressing  Goal: Be  free from injury by end of the shift  Outcome: Progressing  Goal: Verbalize understanding of personal risk factors for fall in the hospital  Outcome: Progressing  Goal: Verbalize understanding of risk factor reduction measures to prevent injury from fall in the home  Outcome: Progressing  Goal: Pace activities to prevent fatigue by end of the shift  Outcome: Progressing     Problem: Skin  Goal: Decreased wound size/increased tissue granulation at next dressing change  Outcome: Progressing  Flowsheets (Taken 2/24/2024 1907)  Decreased wound size/increased tissue granulation at next dressing change:   Promote sleep for wound healing   Utilize specialty bed per algorithm  Goal: Participates in plan/prevention/treatment measures  Outcome: Progressing  Flowsheets (Taken 2/24/2024 1907)  Participates in plan/prevention/treatment measures:   Discuss with provider PT/OT consult   Elevate heels  Goal: Prevent/manage excess moisture  Outcome: Progressing  Flowsheets (Taken 2/24/2024 1907)  Prevent/manage excess moisture:   Cleanse incontinence/protect with barrier cream   Moisturize dry skin  Goal: Prevent/minimize sheer/friction injuries  Outcome: Progressing  Flowsheets (Taken 2/24/2024 1907)  Prevent/minimize sheer/friction injuries:   HOB 30 degrees or less   Use pull sheet   Complete micro-shifts as needed if patient unable. Adjust patient position to relieve pressure points, not a full turn   Turn/reposition every 2 hours/use positioning/transfer devices  Goal: Promote/optimize nutrition  Outcome: Progressing  Flowsheets (Taken 2/24/2024 1907)  Promote/optimize nutrition:   Monitor/record intake including meals   Offer water/supplements/favorite foods  Goal: Promote skin healing  Outcome: Progressing  Flowsheets (Taken 2/24/2024 1907)  Promote skin healing:   Protective dressings over bony prominences   Assess skin/pad under line(s)/device(s)     Problem: Pain  Goal: Takes deep breaths with improved pain control  throughout the shift  Outcome: Progressing  Goal: Turns in bed with improved pain control throughout the shift  Outcome: Progressing  Goal: Performs ADL's with improved pain control throughout shift  Outcome: Progressing     Problem: Respiratory  Goal: Patent airway maintained this shift  Outcome: Progressing  Goal: Tolerate pulmonary toileting this shift  Outcome: Progressing  Goal: Verbalize decreased shortness of breath this shift  Outcome: Progressing  Goal: Increase self care and/or family involvement in next 24 hours  Outcome: Progressing  Goal: No signs of respiratory distress (eg. Use of accessory muscles. Peds grunting)  Outcome: Progressing     Problem: Diabetes  Goal: Maintain electrolyte levels within acceptable range throughout shift  Outcome: Progressing  Goal: Maintain glucose levels >70mg/dl to <250mg/dl throughout shift  Outcome: Progressing  Goal: No changes in neurological exam by end of shift  Outcome: Progressing  Goal: Learn about and adhere to nutrition recommendations by end of shift  Outcome: Progressing  Goal: Vital signs within normal range for age by end of shift  Outcome: Progressing  Goal: Increase self care and/or family involovement by end of shift  Outcome: Progressing  Goal: Receive DSME education by end of shift  Outcome: Progressing     Problem: Discharge Planning  Goal: Discharge to home or other facility with appropriate resources  Outcome: Progressing     Problem: Chronic Conditions and Co-morbidities  Goal: Patient's chronic conditions and co-morbidity symptoms are monitored and maintained or improved  Outcome: Progressing   The patient's goals for the shift include Visit with family.    The clinical goals for the shift include hemodynamically stable    Over the shift, the patient did not make progress toward the following goals. Barriers to progression include . Recommendations to address these barriers include .

## 2024-02-26 NOTE — PROGRESS NOTES
Patient remains dependent on Levophed. At this time there is not a safe discharge plan in place. Will follow.      02/26/24 1524   Discharge Planning   Home or Post Acute Services Other (Comment)  (TBD)   Patient expects to be discharged to: TBD   Does the patient need discharge transport arranged? Yes   RoundTrip coordination needed? Yes

## 2024-02-26 NOTE — CARE PLAN
Problem: Pain  Goal: My pain/discomfort is manageable  Outcome: Progressing     Problem: Safety  Goal: Patient will be injury free during hospitalization  Outcome: Progressing  Goal: I will remain free of falls  Outcome: Progressing     Problem: Daily Care  Goal: Daily care needs are met  Outcome: Progressing     Problem: Psychosocial Needs  Goal: Demonstrates ability to cope with hospitalization/illness  Outcome: Progressing  Goal: Collaborate with me, my family, and caregiver to identify my specific goals  Outcome: Progressing     Problem: Discharge Barriers  Goal: My discharge needs are met  Outcome: Progressing     Problem: Fall/Injury  Goal: Not fall by end of shift  Outcome: Progressing  Goal: Be free from injury by end of the shift  Outcome: Progressing  Goal: Verbalize understanding of personal risk factors for fall in the hospital  Outcome: Progressing  Goal: Verbalize understanding of risk factor reduction measures to prevent injury from fall in the home  Outcome: Progressing  Goal: Pace activities to prevent fatigue by end of the shift  Outcome: Progressing     Problem: Skin  Goal: Decreased wound size/increased tissue granulation at next dressing change  Outcome: Progressing  Flowsheets (Taken 2/25/2024 1920)  Decreased wound size/increased tissue granulation at next dressing change:   Promote sleep for wound healing   Utilize specialty bed per algorithm  Goal: Participates in plan/prevention/treatment measures  Outcome: Progressing  Flowsheets (Taken 2/25/2024 1920)  Participates in plan/prevention/treatment measures:   Discuss with provider PT/OT consult   Elevate heels  Goal: Prevent/manage excess moisture  Outcome: Progressing  Flowsheets (Taken 2/25/2024 1920)  Prevent/manage excess moisture:   Moisturize dry skin   Cleanse incontinence/protect with barrier cream   Use wicking fabric (obtain order)  Goal: Prevent/minimize sheer/friction injuries  Outcome: Progressing  Flowsheets (Taken 2/25/2024  1920)  Prevent/minimize sheer/friction injuries:   Complete micro-shifts as needed if patient unable. Adjust patient position to relieve pressure points, not a full turn   Increase activity/out of bed for meals   Turn/reposition every 2 hours/use positioning/transfer devices   HOB 30 degrees or less  Goal: Promote/optimize nutrition  Outcome: Progressing  Flowsheets (Taken 2/25/2024 1920)  Promote/optimize nutrition:   Monitor/record intake including meals   Offer water/supplements/favorite foods  Goal: Promote skin healing  Outcome: Progressing  Flowsheets (Taken 2/25/2024 1920)  Promote skin healing:   Assess skin/pad under line(s)/device(s)   Protective dressings over bony prominences     Problem: Pain  Goal: Takes deep breaths with improved pain control throughout the shift  Outcome: Progressing  Goal: Turns in bed with improved pain control throughout the shift  Outcome: Progressing  Goal: Performs ADL's with improved pain control throughout shift  Outcome: Progressing     Problem: Respiratory  Goal: Patent airway maintained this shift  Outcome: Progressing  Goal: Tolerate pulmonary toileting this shift  Outcome: Progressing  Goal: Verbalize decreased shortness of breath this shift  Outcome: Progressing  Goal: Increase self care and/or family involvement in next 24 hours  Outcome: Progressing  Goal: No signs of respiratory distress (eg. Use of accessory muscles. Peds grunting)  Outcome: Progressing     Problem: Diabetes  Goal: Maintain electrolyte levels within acceptable range throughout shift  Outcome: Progressing  Goal: Maintain glucose levels >70mg/dl to <250mg/dl throughout shift  Outcome: Progressing  Goal: No changes in neurological exam by end of shift  Outcome: Progressing  Goal: Learn about and adhere to nutrition recommendations by end of shift  Outcome: Progressing  Goal: Vital signs within normal range for age by end of shift  Outcome: Progressing  Goal: Increase self care and/or family  involovement by end of shift  Outcome: Progressing  Goal: Receive DSME education by end of shift  Outcome: Progressing     Problem: Discharge Planning  Goal: Discharge to home or other facility with appropriate resources  Outcome: Progressing     Problem: Chronic Conditions and Co-morbidities  Goal: Patient's chronic conditions and co-morbidity symptoms are monitored and maintained or improved  Outcome: Progressing   The patient's goals for the shift include Visit with family.    The clinical goals for the shift include hemodynamically stable    Over the shift, the patient did not make progress toward the following goals. Barriers to progression include . Recommendations to address these barriers include .

## 2024-02-26 NOTE — PROGRESS NOTES
CONSULT PROGRESS NOTES    SERVICE DATE: 2/26/2024   SERVICE TIME: 12:06 PM    CONSULTING SERVICE: Nephrology    ASSESSMENT AND PLAN   73-year-old dialysis patient in the ICU for prolonged hospitalization with chronic hypotension.  1.  ESRD  2.  Hypotension  3.  Hyponatremia  4.  Hypokalemia  5.  Anemia and chronic kidney disease    She remains dependent on norepinephrine infusion.  We are continuing with thrice weekly hemodialysis.  Use [facility] dialysis today for 3.25 hours with attempted 2 L volume removal, may need to increase the pressors for treatment.  Using low-temperature dialysate.  Using maximum dose midodrine.  Using maximum dose erythropoietin stimulating agents.  Difficult disposition, have tried numerous ways to even get her out of the intensive care unit to no avail.  Case discussed extensively with Dr. Oneal. I will follow her.    SUBJECTIVE  INTERVAL HPI: She remains in the ICU, on pressors.  She is working with physical therapy.  Her blood pressure remains low.  She remains dependent on norepinephrine infusion.  She awaits hemodialysis today.  She thinks it is Wednesday.  She describes no pain.  She remains with a rectal tube in place.    MEDICATIONS:  albumin human, 12.5 g, intravenous, Daily  apixaban, 2.5 mg, oral, BID  cefepime, 1 g, intravenous, Nightly  doxycylcine, 100 mg, oral, Daily  epoetin di or biosimilar, 10,000 Units, intravenous, Every Mon/Wed/Fri  escitalopram, 10 mg, oral, Nightly  fludrocortisone, 0.1 mg, oral, TID  gabapentin, 100 mg, oral, TID  heparin, 2,000 Units, intra-catheter, After Dialysis  heparin, 2,000 Units, intra-catheter, After Dialysis  heparin, 2,000 Units, intra-catheter, After Dialysis  heparin, 2,000 Units, intra-catheter, After Dialysis  insulin lispro, 0-10 Units, subcutaneous, TID with meals  ipratropium-albuteroL, 3 mL, nebulization, q6h while awake  lidocaine, 5 mL, infiltration, Once  midodrine, 20 mg, oral, TID with meals  nystatin, , Topical,  BID  nystatin, , Topical, BID  pantoprazole, 40 mg, oral, Daily   Or  pantoprazole, 40 mg, intravenous, Daily  perflutren lipid microspheres, 0.5-10 mL of dilution, intravenous, Once in imaging  perflutren protein A microsphere, 0.5 mL, intravenous, Once in imaging  psyllium, 1 packet, oral, BID  sulfur hexafluoride microsphr, 2 mL, intravenous, Once in imaging  sulfur hexafluoride microsphr, 2 mL, intravenous, Once in imaging  vancomycin, 500 mg, intravenous, Once  vancomycin, 125 mg, oral, BID  zinc oxide, 1 Application, Topical, BID       norepinephrine, 0.01-3 mcg/kg/min, Last Rate: 0.04 mcg/kg/min (02/26/24 0523)       PRN medications: acetaminophen, albumin human, alteplase, ammonium lactate, benzocaine-menthoL, dextrose 10 % in water (D10W), dextrose, glucagon, heparin flush, heparin flush, ondansetron, oxygen, sennosides-docusate sodium, vancomycin     OBJECTIVE  PHYSICAL EXAM:   Heart Rate:  []   Temp:  [35.7 °C (96.3 °F)-36.4 °C (97.5 °F)]   Resp:  [13-26]   BP: ()/()   Weight:  [108 kg (238 lb 1.6 oz)]   SpO2:  [91 %-100 %]   Body mass index is 43.55 kg/m².  This is a chronically ill-appearing white woman, seems older than her known age  Very pale skin  Moist mucosa  Somewhat confused, but appropriate affect  Regular heart rate  Breathing is unlabored  She has no major abdominal distention  She has bilateral peripheral edema present  Internal jugular tunneled hemodialysis catheter in place  Right-sided PICC in place  Rectal tube in place  No Amos catheter    DATA:   Labs:  Results for orders placed or performed during the hospital encounter of 01/19/24 (from the past 96 hour(s))   POCT GLUCOSE   Result Value Ref Range    POCT Glucose 283 (H) 74 - 99 mg/dL   POCT GLUCOSE   Result Value Ref Range    POCT Glucose 214 (H) 74 - 99 mg/dL   POCT GLUCOSE   Result Value Ref Range    POCT Glucose 194 (H) 74 - 99 mg/dL   CBC   Result Value Ref Range    WBC 13.5 (H) 4.4 - 11.3 x10*3/uL    nRBC 0.0  0.0 - 0.0 /100 WBCs    RBC 2.91 (L) 4.00 - 5.20 x10*6/uL    Hemoglobin 9.1 (L) 12.0 - 16.0 g/dL    Hematocrit 29.3 (L) 36.0 - 46.0 %     (H) 80 - 100 fL    MCH 31.3 26.0 - 34.0 pg    MCHC 31.1 (L) 32.0 - 36.0 g/dL    RDW 20.1 (H) 11.5 - 14.5 %    Platelets 215 150 - 450 x10*3/uL   Basic metabolic panel   Result Value Ref Range    Glucose 335 (H) 65 - 99 mg/dL    Sodium 134 133 - 145 mmol/L    Potassium 3.8 3.4 - 5.1 mmol/L    Chloride 100 97 - 107 mmol/L    Bicarbonate 19 (L) 24 - 31 mmol/L    Urea Nitrogen 38 (H) 8 - 25 mg/dL    Creatinine 3.20 (H) 0.40 - 1.60 mg/dL    eGFR 15 (L) >60 mL/min/1.73m*2    Calcium 9.0 8.5 - 10.4 mg/dL    Anion Gap 15 <=19 mmol/L   Magnesium   Result Value Ref Range    Magnesium 1.70 1.60 - 3.10 mg/dL   POCT GLUCOSE   Result Value Ref Range    POCT Glucose 295 (H) 74 - 99 mg/dL   POCT GLUCOSE   Result Value Ref Range    POCT Glucose 286 (H) 74 - 99 mg/dL   POCT GLUCOSE   Result Value Ref Range    POCT Glucose 164 (H) 74 - 99 mg/dL   POCT GLUCOSE   Result Value Ref Range    POCT Glucose 239 (H) 74 - 99 mg/dL   CBC   Result Value Ref Range    WBC 13.3 (H) 4.4 - 11.3 x10*3/uL    nRBC 0.0 0.0 - 0.0 /100 WBCs    RBC 2.91 (L) 4.00 - 5.20 x10*6/uL    Hemoglobin 9.0 (L) 12.0 - 16.0 g/dL    Hematocrit 29.1 (L) 36.0 - 46.0 %     80 - 100 fL    MCH 30.9 26.0 - 34.0 pg    MCHC 30.9 (L) 32.0 - 36.0 g/dL    RDW 19.8 (H) 11.5 - 14.5 %    Platelets 209 150 - 450 x10*3/uL   Basic metabolic panel   Result Value Ref Range    Glucose 281 (H) 65 - 99 mg/dL    Sodium 129 (L) 133 - 145 mmol/L    Potassium 3.7 3.4 - 5.1 mmol/L    Chloride 96 (L) 97 - 107 mmol/L    Bicarbonate 19 (L) 24 - 31 mmol/L    Urea Nitrogen 26 (H) 8 - 25 mg/dL    Creatinine 2.50 (H) 0.40 - 1.60 mg/dL    eGFR 20 (L) >60 mL/min/1.73m*2    Calcium 8.9 8.5 - 10.4 mg/dL    Anion Gap 14 <=19 mmol/L   Magnesium   Result Value Ref Range    Magnesium 1.60 1.60 - 3.10 mg/dL   POCT GLUCOSE   Result Value Ref Range    POCT Glucose 262  (H) 74 - 99 mg/dL   POCT GLUCOSE   Result Value Ref Range    POCT Glucose 250 (H) 74 - 99 mg/dL   POCT GLUCOSE   Result Value Ref Range    POCT Glucose 170 (H) 74 - 99 mg/dL   POCT GLUCOSE   Result Value Ref Range    POCT Glucose 153 (H) 74 - 99 mg/dL   CBC   Result Value Ref Range    WBC 11.5 (H) 4.4 - 11.3 x10*3/uL    nRBC 0.0 0.0 - 0.0 /100 WBCs    RBC 2.80 (L) 4.00 - 5.20 x10*6/uL    Hemoglobin 8.8 (L) 12.0 - 16.0 g/dL    Hematocrit 28.3 (L) 36.0 - 46.0 %     (H) 80 - 100 fL    MCH 31.4 26.0 - 34.0 pg    MCHC 31.1 (L) 32.0 - 36.0 g/dL    RDW 19.8 (H) 11.5 - 14.5 %    Platelets 225 150 - 450 x10*3/uL   Basic metabolic panel   Result Value Ref Range    Glucose 233 (H) 65 - 99 mg/dL    Sodium 130 (L) 133 - 145 mmol/L    Potassium 3.8 3.4 - 5.1 mmol/L    Chloride 97 97 - 107 mmol/L    Bicarbonate 17 (L) 24 - 31 mmol/L    Urea Nitrogen 31 (H) 8 - 25 mg/dL    Creatinine 3.00 (H) 0.40 - 1.60 mg/dL    eGFR 16 (L) >60 mL/min/1.73m*2    Calcium 9.2 8.5 - 10.4 mg/dL    Anion Gap 16 <=19 mmol/L   Magnesium   Result Value Ref Range    Magnesium 2.00 1.60 - 3.10 mg/dL   POCT GLUCOSE   Result Value Ref Range    POCT Glucose 191 (H) 74 - 99 mg/dL   POCT GLUCOSE   Result Value Ref Range    POCT Glucose 176 (H) 74 - 99 mg/dL   POCT GLUCOSE   Result Value Ref Range    POCT Glucose 184 (H) 74 - 99 mg/dL   CBC   Result Value Ref Range    WBC 10.8 4.4 - 11.3 x10*3/uL    nRBC 0.0 0.0 - 0.0 /100 WBCs    RBC 2.82 (L) 4.00 - 5.20 x10*6/uL    Hemoglobin 8.8 (L) 12.0 - 16.0 g/dL    Hematocrit 28.5 (L) 36.0 - 46.0 %     (H) 80 - 100 fL    MCH 31.2 26.0 - 34.0 pg    MCHC 30.9 (L) 32.0 - 36.0 g/dL    RDW 19.5 (H) 11.5 - 14.5 %    Platelets 225 150 - 450 x10*3/uL   Basic metabolic panel   Result Value Ref Range    Glucose 237 (H) 65 - 99 mg/dL    Sodium 129 (L) 133 - 145 mmol/L    Potassium 3.7 3.4 - 5.1 mmol/L    Chloride 97 97 - 107 mmol/L    Bicarbonate 16 (L) 24 - 31 mmol/L    Urea Nitrogen 37 (H) 8 - 25 mg/dL    Creatinine  3.50 (H) 0.40 - 1.60 mg/dL    eGFR 13 (L) >60 mL/min/1.73m*2    Calcium 9.1 8.5 - 10.4 mg/dL    Anion Gap 16 <=19 mmol/L   Magnesium   Result Value Ref Range    Magnesium 2.10 1.60 - 3.10 mg/dL   POCT GLUCOSE   Result Value Ref Range    POCT Glucose 205 (H) 74 - 99 mg/dL         SIGNATURE: Saeed Mondragon MD PATIENT NAME: Ayanna Gross   DATE: February 26, 2024 MRN: [id number]   TIME: 12:06 PM PAGER: [phone]

## 2024-02-26 NOTE — PROGRESS NOTES
[facility] Pulmonary and Critical Care Medicine   Progress Note        Subjective     Patient seen today sitting up in bed after completing her session with occupational therapy and physical therapy.  She does not have any new complaints.  She is mentating appropriately and alert and oriented x 3 with support of norepinephrine.  Patient advised me that her family will be arriving later on this afternoon.  Will plan on having a conversation with them in terms of goals of care and further plans moving forward.    Scheduled Medications:   albumin human, 12.5 g, intravenous, Daily  apixaban, 2.5 mg, oral, BID  cefepime, 1 g, intravenous, Nightly  doxycylcine, 100 mg, oral, Daily  epoetin di or biosimilar, 10,000 Units, intravenous, Every Mon/Wed/Fri  escitalopram, 10 mg, oral, Nightly  fludrocortisone, 0.1 mg, oral, TID  gabapentin, 100 mg, oral, TID  heparin, 2,000 Units, intra-catheter, After Dialysis  heparin, 2,000 Units, intra-catheter, After Dialysis  heparin, 2,000 Units, intra-catheter, After Dialysis  heparin, 2,000 Units, intra-catheter, After Dialysis  insulin lispro, 0-10 Units, subcutaneous, TID with meals  ipratropium-albuteroL, 3 mL, nebulization, q6h while awake  lidocaine, 5 mL, infiltration, Once  midodrine, 20 mg, oral, TID with meals  nystatin, , Topical, BID  nystatin, , Topical, BID  pantoprazole, 40 mg, oral, Daily   Or  pantoprazole, 40 mg, intravenous, Daily  perflutren lipid microspheres, 0.5-10 mL of dilution, intravenous, Once in imaging  perflutren protein A microsphere, 0.5 mL, intravenous, Once in imaging  psyllium, 1 packet, oral, BID  sulfur hexafluoride microsphr, 2 mL, intravenous, Once in imaging  sulfur hexafluoride microsphr, 2 mL, intravenous, Once in imaging  vancomycin, 500 mg, intravenous, Once  vancomycin, 125 mg, oral, BID  zinc oxide, 1 Application, Topical, BID       Continuous Medications:   norepinephrine, 0.01-3 mcg/kg/min, Last Rate: 0.04 mcg/kg/min (02/26/24  1318)       PRN Medications:   PRN medications: acetaminophen, albumin human, alteplase, ammonium lactate, benzocaine-menthoL, dextrose 10 % in water (D10W), dextrose, glucagon, heparin flush, heparin flush, ondansetron, oxygen, sennosides-docusate sodium, vancomycin      Objective   Vitals:  Most Recent:  Vitals:    02/26/24 1300   BP:    Pulse: 93   Resp: 19   Temp:    SpO2: 95%       24hr Min/Max:  Temp  Min: 35.7 °C (96.3 °F)  Max: 36.4 °C (97.5 °F)  Pulse  Min: 86  Max: 118  BP  Min: 53/26  Max: 145/132  Resp  Min: 13  Max: 26  SpO2  Min: 92 %  Max: 100 %    LDA:   CVC Double lumen Tunneled Right Subclavian (Active)   Earliest Known Present: 12/06/23   Lumen Type: Double lumen  CVC Type: Tunneled  Description (optional): Permacath  Orientation: Right  Location: Subclavian   Number of days: 82       PICC - Adult 02/06/24 Double lumen Right Brachial vein (Active)   Placement Date/Time: 02/06/24 1028   Hand Hygiene Completed: Yes  Catheter Time Out Checklist Completed: Yes  Size (Fr): 5  Lumen Type: Double lumen  Catheter to Vein Ratio Less Than 50%: Yes  Total Length (cm): 37 cm  External Length (cm): 28 cm  Raymon...   Number of days: 19       Rectal Tube With balloon (Active)   Placement Date/Time: 01/19/24 1830   Placed by: Peri Rosario RN  Hand Hygiene Completed: Yes  Type: With balloon   Number of days: 37           Intake/Output Summary (Last 24 hours) at 2/26/2024 1543  Last data filed at 2/26/2024 1318  Gross per 24 hour   Intake 307.39 ml   Output 200 ml   Net 107.39 ml         Physical exam:    Physical Exam  Constitutional:       General: She is not in acute distress.     Appearance: She is not toxic-appearing.   HENT:      Head: Normocephalic and atraumatic.      Nose: Nose normal.      Mouth/Throat:      Pharynx: Oropharynx is clear.   Eyes:      Extraocular Movements: Extraocular movements intact.   Neck:      Comments: No visualized masses  Cardiovascular:      Rate and Rhythm: Normal rate and  regular rhythm.      Heart sounds: No murmur heard.     No friction rub. No gallop.   Pulmonary:      Effort: No respiratory distress.      Breath sounds: Normal breath sounds. No wheezing, rhonchi or rales.   Abdominal:      General: There is no distension.      Palpations: Abdomen is soft.      Tenderness: There is no abdominal tenderness. There is no guarding.   Musculoskeletal:         General: No tenderness.      Right lower leg: Edema present.      Left lower leg: Edema present.   Skin:     General: Skin is warm and dry.      Findings: Lesion (Multiple wounds on her legs) present.   Neurological:      General: No focal deficit present.      Mental Status: She is alert. Mental status is at baseline.      Motor: Weakness present.   Psychiatric:         Mood and Affect: Mood normal.          Lab/Radiology/Diagnostic Review:    All labs and Imaging have been personally reviewed.       Assessment/Plan       73 YOF with h/o Afib, ESRD, C. Diff infection, recurrent pleural effusions, and MRSA bacteremia with MV vegetation who initially presented with anemia, fever and worsening sacral and lower extremities wounds. In the ED found to be hypotensive, anemic. Received IVF, antibiotics and admitted to the ICU with the diagnosis of septic shock. Course complicated by AMS, due to worsening shock, requiring vasopressors. Since then patient remains hypotensive on norepinephrine at variable doses. Had thoracentesis done that showed transudative effusion. Also vascular, general surgery and podiatry were consulted for pressure ulcers.  Blood pressure management has been complicated and patient has been started on Midodrine with the dose increasing to 15 mg TID and Florinef.  Patient also received several courses of antibiotics. Given stable need for Levophed, attempt made to transfer to [facility], however, denied by insurance.       Neuro:  #Acute Metabolic Encephalopathy   -Waxing and waning    Cardiovascular  #Septic Shock    -Maintain MAPs>65  -Continue to titrate pressors    -Continue midodrine mg TID     Pulmonary:  #PNA; Resolved     GI:  -Advance diet as tolerated    Renal:   #ESRD ON HD   #HypoNatremia   -Nephrology following and directing  -Tunneled HD catheter in place    Endocrine:  #Hypotension with suspicion of adrenal insufficiency  - Continue Florinef   - BGM with SSI     Heme/Onc:  #Anemia   - Maintain Goal Hgb > 7     ID:  #Multiple Wounds   IV vanc last dose today with HD per renal dosing  IV Cefipime; last day will be tomorrow  Chronic Doxy for suppression therapy  PO Vanc for cdiff   -Culture Data: Wound cx 2/10: PSAG, Proteus, MRSA     Skin/MSK:  #Multiple wounds  -Currently on antibiotics  -Has been evaluated by podiatry, vascular surgery    #GOC  -Will continue to have goals of care discussions with her and the family in terms of disposition as patient is chronically ill.    ICU CHECK LIST:   Antimicrobials: Vanco/Cefipime and Doxy   Oxygen: RA  Drips: Levophed   Feeding: Advance diet as tolerated  Fluids: -  Analgesia: -  Sedation: -   Thromboprophylaxis: SCDs and SQH   Ulcer prophylaxis: PPI   Glycemic control: BGM with SSI   Bowel care: PRN   Indwelling catheters: FMS   Lines: PIVs, Tunneled double lumen, brachial picc   Dispo: MICU  Code Status: FULL CODE      I have personally spent 42 minutes of critical care time, exclusive of time spent on any procedures, in evaluation and management of this critically ill patient’s condition mentioned above in the assessment and plan.     Denisse Oneal MD  Pulmonary & Critical Care Attending   [phone]    Please excuse any typographical or unwanted errors within this documentation as voice recognition software was used to dictate this note.

## 2024-02-26 NOTE — PROGRESS NOTES
Physical Therapy    Physical Therapy Treatment    Patient Name: Ayanna Gross  MRN: [id number]  Today's Date: 2/26/2024  Time Calculation  Start Time: 1138  Stop Time: 1201  Time Calculation (min): 23 min     Assessment/Plan   PT Assessment  PT Assessment Results: Decreased strength, Decreased range of motion, Decreased endurance, Impaired balance, Decreased mobility, Decreased coordination, Decreased cognition, Impaired judgement, Decreased safety awareness, Decreased skin integrity  Rehab Prognosis: Good  Evaluation/Treatment Tolerance: Patient limited by fatigue  Medical Staff Made Aware: Yes  Strengths: Support of extended family/friends  Barriers to Participation: Comorbidities  End of Session Communication: Bedside nurse  Assessment Comment: Poor progress towards therapy goals, dep x 2 for all mobility; unable to maintain static sitting balance d/t trunk retropulsion; will continue to treat as tolerated.  End of Session Patient Position: Bed, 3 rail up, Alarm on  PT Plan  Inpatient/Swing Bed or Outpatient: Inpatient  PT Plan  Treatment/Interventions: Bed mobility, Transfer training, Balance training, Neuromuscular re-education, Strengthening, Endurance training, Range of motion, Therapeutic exercise, Therapeutic activity  PT Plan: Skilled PT  PT Frequency: 3 times per week  PT Discharge Recommendations: Moderate intensity level of continued care  PT Recommended Transfer Status: Total assist, Assist x2  PT - OK to Discharge: Yes    General Visit Information:   PT  Visit  PT Received On: 02/26/24  Response to Previous Treatment: Patient with no complaints from previous session.  General  Reason for Referral: Impaired mobility with septic shock  Past Medical History Relevant to Rehab: AFIB, ESRD, dialysis, C-diff, pleural effusions, endocarditis, thoracentesis, asthma, CAD, CHF, HTN, sleep apnea, DM, carpal tunnel surgery, partial mastectomy right, mastectomy left, foot surgery, umbilical hernia repair  Missed  Visit: No  Missed Visit Reason: Other (Comment)  Family/Caregiver Present: No  Co-Treatment: OT  Co-Treatment Reason: medically complex pt requiring two person assist for safe handling  Prior to Session Communication: Bedside nurse  Patient Position Received: Bed, 3 rail up, Alarm on  Preferred Learning Style: verbal  General Comment: Pt cleared for therapy via RN, received in supine, NAD, c/o fatigue but agreeable to participate in therapy. (+) telemetry, IV, rectal tube, B PRAFOs    Subjective   Precautions:  Precautions  Hearing/Visual Limitations: hard of hearing  Medical Precautions: Fall precautions, Infection precautions (C-diff)  Vital Signs:  Vital Signs  BP: (!) 79/36  MAP (mmHg): 47    Objective   Pain:  Pain Assessment  Pain Assessment: 0-10  Pain Score: 0 - No pain  Cognition:  Cognition  Overall Cognitive Status: Impaired  Orientation Level: Disoriented to situation  Following Commands: Follows one step commands with repetition  Insight: Moderate  Processing Speed: Delayed  Postural Control:  Postural Control  Postural Control: Impaired  Posture Comment: forward head, trunk retropulsion in attempted sitting  Static Sitting Balance  Static Sitting-Balance Support: Bilateral upper extremity supported, Feet supported  Static Sitting-Level of Assistance: Dependent (x2)  Extremity/Trunk Assessments:  RLE   RLE : Exceptions to WFL (> 2/5; AAROM hip/knee significantly limited)  PROM RLE (degrees)  R Hip Flexion 0-125: 30  R Knee Flexion 0-130: 30  LLE   LLE : Exceptions to WFL (> 2/5; AAROM hip/knee significantly limited)  PROM LLE (degrees)  L Hip Flexion 0-125: 20  L Knee Flexion 0-130: 20  Activity Tolerance:  Activity Tolerance  Endurance: Decreased tolerance for upright activites  Treatments:  Therapeutic Exercise  Therapeutic Exercise Performed: Yes  Therapeutic Exercise Activity 1: B AAROM heel slides x 10 (significant rigidity; increased time/cueing for completion)  Therapeutic Exercise Activity 2: B  ankle pumps x 10    Bed Mobility  Bed Mobility: Yes  Bed Mobility 1  Bed Mobility 1: Supine to sitting, Sitting to supine  Level of Assistance 1: Dependent (x2)  Bed Mobility Comments 1: assist for trunk elevation and advancement of BLE off EOB; significant trunk retropulsion, unable to maintain static sitting; returned sit > supine with dep x 2 for controlled trunk descent and lifting BLE onto bed; dep x 2 boost towards HOB  Bed Mobility 2  Bed Mobility  2: Long sit  Level of Assistance 2: Dependent (x2)  Bed Mobility Comments 2: attempted long sitting with pt pulling up on therapists hands (pt unable to reach bedrails); unable to maintain long sitting d/t significant trunk retropulsion; dep x 2 return to supine    Ambulation/Gait Training  Ambulation/Gait Training Performed: No  Transfers  Transfer: No    Stairs  Stairs: No    Outcome Measures:  [facility] Basic Mobility  Turning from your back to your side while in a flat bed without using bedrails: Total  Moving from lying on your back to sitting on the side of a flat bed without using bedrails: Total  Moving to and from bed to chair (including a wheelchair): Total  Standing up from a chair using your arms (e.g. wheelchair or bedside chair): Total  To walk in hospital room: Total  Climbing 3-5 steps with railing: Total  Basic Mobility - Total Score: 6    Education Documentation  Mobility Training, taught by Lesly Banuelos PT at 2/26/2024 12:33 PM.  Learner: Patient  Readiness: Acceptance  Method: Explanation, Demonstration  Response: Needs Reinforcement    Education Comments  No comments found.      OP EDUCATION:  Outpatient Education  Individual(s) Educated: Patient  Education Provided: Fall Risk, POC  Risk and Benefits Discussed with Patient/Caregiver/Other: yes  Patient/Caregiver Demonstrated Understanding: no  Plan of Care Discussed and Agreed Upon: yes  Education Comment: reinforcement needed    Encounter Problems       Encounter Problems (Active)        Mobility       Bed mobility including supine to long sit and long sit to supine with mod assist x 2. (Progressing)       Start:  02/22/24    Expected End:  03/07/24            Bed mobility including supine to sit and sit to supine with max assist x 1-2 persons. (Progressing)       Start:  02/22/24    Expected End:  03/07/24            Patient will maintain midline while sitting on side of bed with mod assist x 1-2 persons. (Progressing)       Start:  02/22/24    Expected End:  03/07/24               PT Problem       ROM jessica LE knee flexion ~60 degrees. (Progressing)       Start:  02/22/24    Expected End:  03/07/24

## 2024-02-26 NOTE — CARE PLAN
Problem: Respiratory  Goal: Patent airway maintained this shift  Outcome: Progressing  Goal: Tolerate pulmonary toileting this shift  Outcome: Progressing  Goal: Verbalize decreased shortness of breath this shift  Outcome: Progressing  Goal: Increase self care and/or family involvement in next 24 hours  Outcome: Progressing  Goal: No signs of respiratory distress (eg. Use of accessory muscles. Peds grunting)  Outcome: Progressing

## 2024-02-26 NOTE — PROGRESS NOTES
Occupational Therapy    OT Treatment    Patient Name: Ayanna Gross  MRN: [id number]  Today's Date: 2/26/2024  Time Calculation  Start Time: 1137  Stop Time: 1200  Time Calculation (min): 23 min         Assessment:  OT Assessment: tolerated session fairly with limited functional tolerance this date.  End of Session Communication: Bedside nurse  End of Session Patient Position: Bed, 3 rail up, Alarm on  OT Assessment Results: Decreased ADL status, Decreased upper extremity range of motion, Decreased upper extremity strength, Decreased endurance  Plan:  Treatment Interventions: ADL retraining, UE strengthening/ROM, Endurance training, Patient/family training, Continued evaluation  OT Frequency: 2 times per week  OT Discharge Recommendations: Moderate intensity level of continued care  OT - OK to Discharge: Yes  Treatment Interventions: ADL retraining, UE strengthening/ROM, Endurance training, Patient/family training, Continued evaluation    Subjective   Previous Visit Info:  OT Last Visit  OT Received On: 02/26/24  General:  General  Co-Treatment: PT  Co-Treatment Reason: medically complex pt requiring two person assist for safe handling  Patient Position Received: Bed, 3 rail up, Alarm on  General Comment: cleared for therapy per RN. Pt supine in bed upon arrival, fatigued/tired but agreeable to tx  Precautions:  Medical Precautions: Fall precautions, Infection precautions (cdiff)    Pain:  Pain Assessment  Pain Assessment: 0-10  Pain Score: 0 - No pain    Objective    Cognition:  Cognition  Overall Cognitive Status: Within Functional Limits    Bed Mobility/Transfers: Bed Mobility  Bed Mobility: Yes  Bed Mobility 1  Bed Mobility 1: Supine to sitting, Sitting to supine  Level of Assistance 1: Maximum assistance (x2)  Bed Mobility Comments 1: assist with BLE advancement and trunk elevation with use of drawsheet, with strong retropulsion noted, requiring to return to supine to increase safety. Dependent to boost to HOB  with use of drawsheet  Bed Mobility 2  Bed Mobility  2: Long sit  Level of Assistance 2: Dependent (x2)  Bed Mobility Comments 2: attempted to place pts hand on bed rail for UE support with poor carryover, requiring dependent x2 long seat with use of drawsheet ~3 seconds    Sitting Balance:  Static Sitting Balance  Static Sitting-Level of Assistance: Maximum assistance (x2)  Static Sitting-Comment/Number of Minutes: poor    Therapy/Activity: Therapeutic Exercise  Therapeutic Exercise Activity 1: participated in AAROM 2x10 in bicep curls and chest press to improve strength and functional tolerance to increase independence with transfers and ADL tasks    Outcome Measures:[facility] Daily Activity  Putting on and taking off regular lower body clothing: Total  Bathing (including washing, rinsing, drying): Total  Putting on and taking off regular upper body clothing: Total  Toileting, which includes using toilet, bedpan or urinal: Total  Taking care of personal grooming such as brushing teeth: A lot  Eating Meals: A lot  Daily Activity - Total Score: 8      Education Documentation  Handouts, taught by ANDERSON Miller at 2/26/2024 12:21 PM.  Learner: Patient  Readiness: Acceptance  Method: Explanation  Response: Verbalizes Understanding, Needs Reinforcement    Body Mechanics, taught by ANDERSON Miller at 2/26/2024 12:21 PM.  Learner: Patient  Readiness: Acceptance  Method: Explanation  Response: Verbalizes Understanding, Needs Reinforcement    Home Exercise Program, taught by ANDERSON Miller at 2/26/2024 12:21 PM.  Learner: Patient  Readiness: Acceptance  Method: Explanation  Response: Verbalizes Understanding, Needs Reinforcement    ADL Training, taught by ANDERSON Miller at 2/26/2024 12:21 PM.  Learner: Patient  Readiness: Acceptance  Method: Explanation  Response: Verbalizes Understanding, Needs Reinforcement    Education Comments  No comments found.      Problem: OT  Goals  Goal: Pt will complete grooming Min A with use of AE/compensatory techniques   Outcome: Progressing  Goal: Pt and family with perform simple B UE HEP SUP.  Outcome: Progressing  Goal: EOB Assessment pending Vital Signs.  Outcome: Progressing

## 2024-02-26 NOTE — PROGRESS NOTES
Ayanna Gross is a 73 y.o. female on day 38 of admission presenting with Septic shock (CMS/HCC).    Subjective   Interval History:   Afebrile, no chills  Resting comfortably  Remains on pressors      Review of Systems   All other systems reviewed and are negative.      Objective   Range of Vitals (last 24 hours)  Heart Rate:  []   Temp:  [35.7 °C (96.3 °F)-36.4 °C (97.5 °F)]   Resp:  [13-26]   BP: ()/()   Weight:  [108 kg (238 lb 1.6 oz)]   SpO2:  [92 %-100 %]   Daily Weight  02/25/24 : 108 kg (238 lb 1.6 oz)    Body mass index is 43.55 kg/m².    Physical Exam  Constitutional:       Appearance: Awake, alert  HENT:      Head: Normocephalic and atraumatic.      Nose: Nose normal.   Eyes:      Extraocular Movements: Extraocular movements intact.      Conjunctiva/sclera: Conjunctivae normal.   Cardiovascular:      Heart sounds: Normal heart sounds, S1 normal and S2 normal.   Pulmonary:      Breath sounds: Decreased breath sounds present.   Abdominal:      General: Bowel sounds are normal.      Palpations: Abdomen is soft.   Musculoskeletal:      Cervical back: Normal range of motion and neck supple.   Skin:     Comments: Bilateral posterior heel ulcers with moderate amount of granulation tissue with some eschar  Neurological:      Mental Status: She is awake, alert       Antibiotics  aspirin tablet 325 mg  acetaminophen (Tylenol) tablet 650 mg  cefepime (Maxipime) 1 g in dextrose 5 % 50 mL IV  sodium chloride 0.9 % bolus 2,229 mL  apixaban (Eliquis) tablet 2.5 mg  escitalopram (Lexapro) tablet 10 mg  febuxostat (Uloric) tablet 40 mg  fenofibrate (Triglide) tablet 160 mg  gabapentin (Neurontin) capsule 100 mg  midodrine (Proamatine) tablet 15 mg  nystatin (Mycostatin) 100,000 unit/gram powder  oxyCODONE-acetaminophen (Percocet) 5-325 mg per tablet 1 tablet  simvastatin (Zocor) tablet 20 mg  piperacillin-tazobactam-dextrose (Zosyn) IV 2.25 g  vancomycin (Vancocin) capsule 125 mg  oxygen (O2)  therapy  dextrose 50 % injection 25 g  glucagon (Glucagen) injection 1 mg  dextrose 10 % in water (D10W) infusion  insulin lispro (HumaLOG) injection 0-10 Units  nystatin (Mycostatin) 100,000 unit/gram powder 1 Application  vancomycin-diluent combo no.1 (Xellia) IVPB 1,750 mg  piperacillin-tazobactam-dextrose (Zosyn) IV 2.25 g  sodium chloride 0.9 % bolus 500 mL  norepinephrine (Levophed) 8 mg in dextrose 5% 250 mL (0.032 mg/mL) infusion (premix)  vancomycin (Vancocin) placeholder  pantoprazole (ProtoNix) EC tablet 40 mg  pantoprazole (ProtoNix) injection 40 mg  sennosides-docusate sodium (Judy-Colace) 8.6-50 mg per tablet 1 tablet  doxycycline (Vibramycin) in dextrose 5 % in water (D5W) 100 mL  mg  LORazepam (Ativan) injection 2 mg  magnesium sulfate IV 2 g  zinc oxide 20 % ointment 1 Application  nystatin (Mycostatin) cream  norepinephrine (Levophed) 8 mg in dextrose 5% 250 mL (0.032 mg/mL) infusion (premix)  heparin 1,000 unit/mL injection 2,000 Units  heparin 1,000 unit/mL injection 2,000 Units  nystatin (Mycostatin) ointment  acetaminophen (Tylenol) oral liquid 1,000 mg  albumin human 25 % solution 12.5 g  perflutren lipid microspheres (Definity) injection 0.5-10 mL of dilution  sulfur hexafluoride microsphr (Lumason) injection 24.28 mg  perflutren protein A microsphere (Optison) injection 0.5 mL  ipratropium-albuteroL (Duo-Neb) 0.5-2.5 mg/3 mL nebulizer solution 3 mL  sodium chloride 3 % nebulizer solution 3 mL  vancomycin-diluent combo no.1 (Xellia) IVPB 750 mg  sennosides-docusate sodium (Judy-Colace) 8.6-50 mg per tablet 1 tablet  acetaminophen (Tylenol) tablet 650 mg  doxycycline (Vibramycin) capsule 100 mg  magnesium oxide (Mag-Ox) tablet 400 mg  vasopressin (Vasostrict) 0.2 unit/mL infusion  norepinephrine (Levophed) 8 mg in dextrose 5% 250 mL (0.032 mg/mL) infusion (premix)  heparin 1,000 unit/mL injection 2,000 Units  heparin 1,000 unit/mL injection 2,000 Units  albumin human 25 % solution 12.5  g  vancomycin (Xellia) 1 g in 200 mL (Xellia) IVPB 1 g  ipratropium-albuteroL (Duo-Neb) 0.5-2.5 mg/3 mL nebulizer solution 3 mL  sodium chloride 3 % nebulizer solution 3 mL  albumin human 5 % infusion 12.5 g  heparin 1,000 unit/mL injection 2,000 Units  heparin 1,000 unit/mL injection 2,000 Units  vancomycin (Vancocin) capsule 125 mg  albumin human 25 % solution 12.5 g  heparin 1,000 unit/mL injection 2,000 Units  heparin 1,000 unit/mL injection 2,000 Units  ipratropium-albuteroL (Duo-Neb) 0.5-2.5 mg/3 mL nebulizer solution 3 mL  lidocaine (Xylocaine) 10 mg/mL (1 %) injection 50 mg  sodium phosphate 15 mmol in sodium chloride 0.9% 250 mL IV  sod phos di, mono-K phos mono (K Phos Neutral) tablet 250 mg  potassium, sodium phosphates (Phos-NaK) 280-160-250 mg packet 1 packet  doxycycline (Vibramycin) capsule 100 mg  fludrocortisone (Florinef) tablet 0.1 mg  gabapentin (Neurontin) capsule 100 mg  acetaminophen (Tylenol) tablet 1,000 mg  benzocaine-menthoL (Dermoplast) topical spray  vancomycin (Vancocin) capsule 125 mg  heparin 1,000 unit/mL injection 2,000 Units  heparin 1,000 unit/mL injection 2,000 Units  magnesium sulfate IV 2 g  albumin human 25 % solution 12.5 g  promethazine (Phenergan) injection 12.5 mg  ondansetron (Zofran) injection 4 mg  lidocaine (Xylocaine) 10 mg/mL (1 %) injection 50 mg  alteplase (Cathflo Activase) injection 2 mg  acetaminophen (Tylenol) tablet 650 mg  psyllium (Metamucil) 3.4 gram packet 1 packet  perflutren lipid microspheres (Definity) injection 0.5-10 mL of dilution  sulfur hexafluoride microsphr (Lumason) injection 24.28 mg  perflutren protein A microsphere (Optison) injection 0.5 mL  epoetin di-epbx (Retacrit) injection 10,000 Units  sulfur hexafluoride microsphr (Lumason) injection 24.28 mg  perflutren lipid microspheres (Definity) injection 0.5-10 mL of dilution  perflutren lipid microspheres (Definity) injection 0.5-10 mL of dilution  sulfur hexafluoride microsphr (Lumason)  injection 24.28 mg  perflutren protein A microsphere (Optison) injection 0.5 mL  ammonium lactate (Lac-Hydrin) 12 % lotion 1 Application  norepinephrine (Levophed) 8 mg in dextrose 5% 250 mL (0.032 mg/mL) infusion (premix)  fludrocortisone (Florinef) tablet 0.1 mg  albumin human 25 % solution 25 g  albumin human 25 % solution 25 g  meropenem (Merrem) 500 mg in sodium chloride 0.9 % 100 mL IV  potassium chloride 20 mEq in 100 mL IV premix  potassium chloride 20 mEq in 100 mL IV premix  magnesium sulfate IV 2 g  cefepime (Maxipime) 1 g in dextrose 5 % 50 mL IV  vancomycin-diluent combo no.1 (Xellia) IVPB 750 mg  albumin human 25 % solution 12.5 g  vancomycin (Xellia) 1 g in 200 mL (Xellia) IVPB 1 g  vancomycin (Vancocin) placeholder  vancomycin (Xellia) 1 g in 200 mL (Xellia) IVPB 1 g  midodrine (Proamatine) tablet 20 mg  vancomycin-diluent combo no.1 (Xellia) IVPB 750 mg  magnesium sulfate IV 2 g  heparin flush 100 unit/mL syringe 160 Units  heparin flush 100 unit/mL syringe 160 Units  vancomycin (Vancocin) 500 mg in dextrose 5 % 100 mL IV  albumin human 25 % solution 12.5 g  magnesium sulfate IV 2 g  cefepime (Maxipime) 1 g in dextrose 5 % 50 mL IV  vancomycin (Vancocin) placeholder  cefepime (Maxipime) 1 g in dextrose 5 % 50 mL IV  vancomycin (Vancocin) 500 mg in dextrose 5 % 100 mL IV      Relevant Results  Labs  Results from last 72 hours   Lab Units 02/26/24  0515 02/25/24  0421 02/24/24  0353   WBC AUTO x10*3/uL 10.8 11.5* 13.3*   HEMOGLOBIN g/dL 8.8* 8.8* 9.0*   HEMATOCRIT % 28.5* 28.3* 29.1*   PLATELETS AUTO x10*3/uL 225 225 209     Results from last 72 hours   Lab Units 02/26/24  0515 02/25/24  0421 02/24/24  0353   SODIUM mmol/L 129* 130* 129*   POTASSIUM mmol/L 3.7 3.8 3.7   CHLORIDE mmol/L 97 97 96*   CO2 mmol/L 16* 17* 19*   BUN mg/dL 37* 31* 26*   CREATININE mg/dL 3.50* 3.00* 2.50*   GLUCOSE mg/dL 237* 233* 281*   CALCIUM mg/dL 9.1 9.2 8.9   ANION GAP mmol/L 16 16 14   EGFR mL/min/1.73m*2 13* 16* 20*          Estimated Creatinine Clearance: 17.1 mL/min (A) (by C-G formula based on SCr of 3.5 mg/dL (H)).  C-Reactive Protein   Date Value Ref Range Status   12/25/2023 5.20 (H) 0.00 - 2.00 mg/dL Final     CRP   Date Value Ref Range Status   06/23/2023 19.9 (H) 0 - 2.0 MG/DL Final     Comment:     Performed at [place] [id number]   05/22/2022 1.0 0 - 2.0 MG/DL Final     Comment:     Performed at [place] [address]     Microbiology  Reviewed  Imaging  Transthoracic Echo (TTE) Limited    Result Date: 2/7/2024           [place] [address]            Phone [phone] TRANSTHORACIC ECHOCARDIOGRAM REPORT  Patient Name:      BASIA Gray Physician:    [id number] Littlejohn DO Study Date:        2/7/2024            Ordering Provider:    [id number] SANGITA LITTLEJOHN MRN/PID:           [id number]            Fellow: Accession#:        [id number]        Nurse: Date of Birth/Age: 1950 / 73 years Sonographer:          Rena Talavera ACS,                                                              YAEL, FABIAN Gender:            F                   Additional Staff: Height:            157.48 cm           Admit Date: Weight:                                Admission Status:     Inpatient - Routine BSA:               m2                  Department Location:  [facility] ICU Blood Pressure: 95 /42 mmHg Study Type:    TRANSTHORACIC ECHO (TTE) LIMITED Diagnosis/ICD: Hypotension, unspecified-I95.9; Mitral valve disorder-I05.9 Indication:    Limited to assess for MS, Hypotension CPT Codes:     Echo Limited-72688; Color Doppler-16027; Doppler Limited-54189 Patient History: Pertinent History: Hypotension, hx of MV endocarditis, PAD, DM2 ESRD on  Hd. Study Detail: The following Echo studies were performed: 2D, M-Mode, Doppler and               color flow. Technically challenging study due to poor acoustic               windows and L Breast implant. Unable to obtain suprasternal notch               view.  PHYSICIAN INTERPRETATION: Left Ventricle: Left ventricular systolic function is normal. There are no regional wall motion abnormalities. The left ventricular cavity size is normal. Left ventricular diastolic filling was not assessed. Left Atrium: The left atrium is moderate to severely dilated. Right Ventricle: The right ventricle is normal in size. There is normal right ventricular global systolic function. Right Atrium: The right atrium was not well visualized. Aortic Valve: The aortic valve was not assessed. Aortic valve regurgitation was not assessed. Mitral Valve: The mitral valve is abnormal. There is evidence of mild mitral valve stenosis. The doppler estimated mean and peak diastolic pressure gradients are 4.0 mmHg and 6.6 mmHg respectively. There is severe mitral annular calcification. There is no evidence of mitral valve regurgitation. Tricuspid Valve: The tricuspid valve was not assessed. Tricuspid regurgitation was not assessed. Pulmonic Valve: The pulmonic valve is not well visualized. The pulmonic valve regurgitation was not assessed. Pericardium: There is no pericardial effusion noted. Aorta: The aortic root was not assessed.  CONCLUSIONS:  1. Left ventricular systolic function is normal.  2. The left atrium is moderate to severely dilated.  3. There is severe mitral annular calcification. QUANTITATIVE DATA SUMMARY: LV DIASTOLIC FUNCTION:                        Normal Ranges: MV Peak E:    1.23 m/s (0.7-1.2 m/s) MV Peak A:    1.12 m/s (0.42-0.7 m/s) E/A Ratio:    1.10     (1.0-2.2) MV lateral e' 0.04 m/s MV medial e'  0.07 m/s MITRAL VALVE:                      Normal Ranges: MV Vmax:    1.28 m/s (<=1.3m/s) MV peak P.6 mmHg (<5mmHg) MV  mean P.0 mmHg (<48mmHg)  RIGHT VENTRICLE: RV Basal 2.97 cm  [id number] Herminio Lemus DO Electronically signed on 2024 at 3:50:21 PM  ** Final **     Transthoracic Echo (TTE) Limited    Result Date: 2024           [place] [id number]            Phone [phone] TRANSTHORACIC ECHOCARDIOGRAM REPORT  Patient Name:      BASIA Gray Physician:    [id number] Lemus DO Study Date:        2024            Ordering Provider:    [id number] REBECA RESENDEZ MRN/PID:           [id number]            Fellow: Accession#:        [id number]        Nurse: Date of Birth/Age: 1950 years Sonographer:          Jennifer WALL Gender:            F                   Additional Staff: Height:            157.48 cm           Admit Date:           2024 Weight:            98.88 kg            Admission Status:     Inpatient - Routine BSA:               1.98 m2             Department Location: Blood Pressure: 59 /49 mmHg Study Type:    TRANSTHORACIC ECHO (TTE) LIMITED Diagnosis/ICD: Acute on chronic diastolic (congestive) heart failure                (CHF)-I50.33 Indication:    Acute on chronic diastolic congestive heart failure CPT Codes:     Echo Limited-41845; Color Doppler-17986; Doppler Limited-10917 Patient History: Pertinent History: PAF Peripheralvascular disease low blood pressure HTN Breast                    Cancer Mixed Hyperlipidemia HF CVA Hypotension DMII CAD CKD. Study Detail: The following Echo studies were performed: 2D, M-Mode, Doppler and               color flow. Technically challenging study due to body habitus,               patient lying in supine position, poor acoustic windows, prominent               lung artifact and Breast Prosthesis. Definity used as a contrast               agent for endocardial border  definition. Unable to obtain               suprasternal notch view.  PHYSICIAN INTERPRETATION: Left Ventricle: Left ventricular systolic function is normal, with an estimated ejection fraction of 60-65%. There are no regional wall motion abnormalities. The left ventricular cavity size is normal. Left ventricular diastolic filling was indeterminate. Left Atrium: The left atrium is moderately dilated. Right Ventricle: The right ventricle is normal in size. There is normal right ventricular global systolic function. Right Atrium: The right atrium is normal in size. Aortic Valve: The aortic valve is trileaflet. There is no evidence of aortic valve regurgitation. The peak instantaneous gradient of the aortic valve is 5.3 mmHg. Mitral Valve: The mitral valve is normal in structure. There is severe mitral annular calcification. There is trace mitral valve regurgitation. Tricuspid Valve: The tricuspid valve is structurally normal. There is mild tricuspid regurgitation. Pulmonic Valve: The pulmonic valve is not well visualized. The pulmonic valve regurgitation was not well visualized. Pericardium: There is no pericardial effusion noted. Aorta: The aortic root is normal.  CONCLUSIONS:  1. Left ventricular systolic function is normal with a 60-65% estimated ejection fraction.  2. The left atrium is moderately dilated.  3. There is severe mitral annular calcification. QUANTITATIVE DATA SUMMARY: 2D MEASUREMENTS:                          Normal Ranges: LAs:           3.80 cm   (2.7-4.0cm) IVSd:          0.92 cm   (0.6-1.1cm) LVPWd:         1.09 cm   (0.6-1.1cm) LVIDd:         2.98 cm   (3.9-5.9cm) LVIDs:         2.12 cm LV Mass Index: 41.2 g/m2 LV % FS        28.9 % LA VOLUME:                               Normal Ranges: LA Vol A4C:        58.3 ml    (22+/-6mL/m2) LA Vol A2C:        60.9 ml LA Vol BP:         62.1 ml LA Vol Index A4C:  29.4ml/m2 LA Vol Index A2C:  30.7 ml/m2 LA Vol Index BP:   31.3 ml/m2 LA Area A4C:       20.5  cm2 LA Area A2C:       20.1 cm2 LA Major Axis A4C: 6.1 cm LA Major Axis A2C: 5.6 cm LA Volume Index:   29.3 ml/m2 LA Vol A4C:        53.1 ml LA Vol A2C:        58.4 ml LV SYSTOLIC FUNCTION BY 2D PLANIMETRY (MOD):                     Normal Ranges: EF-A4C View: 68.2 % (>=55%) EF-A2C View: 65.9 % EF-Biplane:  67.8 % AORTIC VALVE:                         Normal Ranges: AoV Vmax:      1.15 m/s (<=1.7m/s) AoV Peak P.3 mmHg (<20mmHg) LVOT Max Shimon:  1.11 m/s (<=1.1m/s) LVOT VTI:      16.20 cm LVOT Diameter: 1.90 cm  (1.8-2.4cm) AoV Area,Vmax: 2.74 cm2 (2.5-4.5cm2) PULMONIC VALVE:                         Normal Ranges: PV Accel Time: 63 msec  (>120ms) PV Max Shimon:    1.0 m/s  (0.6-0.9m/s) PV Max P.3 mmHg  [id number] Herminio Lemus DO Electronically signed on 2024 at 7:58:25 AM  ** Final **      Assessment/Plan     Septic shock-patient on pressors  Left-sided pleural effusion   Left lower lobe pneumonia-treated  Proteus urinary tract infection-treated  History of MRSA endocarditis  History of C. 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

## 2024-02-26 NOTE — NURSING NOTE
Upon rounding right chest dialysis catheter with current CHG dressing dry and intact. Right upper arm dual lumen PICC due for routine dressing change. Both lumens have brisk blood return and flush easily. Site without redness, edema or bleeding noted. Pt tolerated well.

## 2024-02-26 NOTE — PROGRESS NOTES
Vancomycin Dosing by Pharmacy- FOLLOW-UP (HEMODIALYSIS)    Ayanna Gross is a 73 y.o. year old female who Pharmacy has been consulted for vancomycin dosing for Vancomycin Indications: Skin & Soft Tissue. Based on the patient's indication and renal status this patient will be dosed based on a pre-HD level of 20-25 mcg/mL.     Patient is currently on hemodialysis.    Vancomycin maintenance dose: 500 mg after each dialysis session MWF     Vancomycin pre-HD level --    Lab Results   Component Value Date    VANCORANDOM 24.0 (H) 02/22/2024       Visit Vitals  BP (!) 145/132   Pulse 92   Temp 35.7 °C (96.3 °F) (Axillary)   Resp 19        Lab Results   Component Value Date    CREATININE 3.50 (H) 02/26/2024    CREATININE 3.00 (H) 02/25/2024    CREATININE 2.50 (H) 02/24/2024    CREATININE 3.20 (H) 02/23/2024       I/O last 3 completed shifts:  In: 565.6 (6.1 mL/kg) [P.O.:120; I.V.:345.6 (3.7 mL/kg); IV Piggyback:100]  Out: 1100 (11.8 mL/kg) [Stool:1100]  Dosing Weight: 93 kg     Assessment/Plan     Within goal random/trough level  Vanco 500mg dose to be given after dialysis today and then will be discontinued.  Will continue to monitor renal function daily while on vancomycin and order serum creatinine at least every 48 hours if not already ordered.  Follow for continued vancomycin needs, clinical response, and signs/symptoms of toxicity.     Kendall Alfonso, FaithD

## 2024-02-27 NOTE — PROGRESS NOTES
"Nutrition Follow up Note    Nutrition Assessment      Pt continues to deny supplements with continued poor PO intake.     Nutrition History:     Energy Intake: Fair 50-75 %      Anthropometrics:  Ht: 157.5 cm (5' 2\"), Wt: 109 kg (240 lb 8.4 oz), BMI: 39.99  IBW/kg (Dietitian Calculated): 50 kg  Percent of IBW: 151.6 %  Adjusted Body Weight (kg): 56.36 kg    Weight Change:  Daily Weight  02/26/24 : 109 kg (240 lb 8.4 oz)  01/05/24 : 72.8 kg (160 lb 7.9 oz)  11/19/23 : 72.6 kg (160 lb)  01/19/23 : 80.2 kg (176 lb 12.9 oz)  11/07/22 : 86.2 kg (190 lb)  07/21/22 : 80.2 kg (176 lb 12.9 oz)  05/26/22 : 77.1 kg (170 lb)  04/04/22 : 86.2 kg (190 lb)  06/29/21 : 87.9 kg (193 lb 12.6 oz)  05/11/21 : 81.6 kg (180 lb)                   Nutrition Focused Physical Exam Findings:             Edema  Edema: +3 moderate, +2 mild  Edema Location: non-pitting documented to BUE, BLE    Physical Findings (Nutrition Deficiency/Toxicity)  Skin: Positive (R tibia skin tear, L toe pressure injury, bilateral buttock pressure injuries, L ankle DM ulcer, bilateral heel DM ulcers)    Nutrition Significant Labs:  Lab Results   Component Value Date    WBC 10.1 02/27/2024    HGB 8.9 (L) 02/27/2024    HCT 28.1 (L) 02/27/2024     02/27/2024    CHOL 104 (L) 07/01/2023    TRIG 155 (H) 07/01/2023    HDL 30 (L) 07/01/2023    ALT 6 02/13/2024    AST 14 02/13/2024     (L) 02/27/2024    K 3.6 02/27/2024    CL 95 (L) 02/27/2024    CREATININE 2.50 (H) 02/27/2024    BUN 23 02/27/2024    CO2 18 (L) 02/27/2024    TSH 6.10 (H) 01/24/2024    INR 1.3 (H) 01/19/2024    HGBA1C 8.8 (H) 06/25/2023    ALBUR 1,315 (H) 02/24/2019       Current Facility-Administered Medications:     acetaminophen (Tylenol) tablet 650 mg, 650 mg, oral, q4h PRN, MADHURI Mccray-CNP, 650 mg at 02/12/24 2056    albumin human 25 % solution 12.5 g, 12.5 g, intravenous, q1h PRN, Saeed Mondragon MD, Stopped at 02/14/24 1817    albumin human 25 % solution 12.5 g, 12.5 g, " intravenous, Daily, Raj Mcgee MD, Stopped at 02/26/24 0926    alteplase (Cathflo Activase) injection 2 mg, 2 mg, intra-catheter, PRN, LILLIE Mccray    ammonium lactate (Lac-Hydrin) 12 % lotion 1 Application, 1 Application, Topical, q1h PRN, Minh Shane MD, 1 Application at 02/15/24 0945    apixaban (Eliquis) tablet 2.5 mg, 2.5 mg, oral, BID, Judd Tavera DO, 2.5 mg at 02/26/24 2048    benzocaine-menthoL (Dermoplast) topical spray, , Topical, 4x daily PRN, LILLIE Diaz    cefepime (Maxipime) 1 g in dextrose 5 % 50 mL IV, 1 g, intravenous, Nightly, Kendall Alfonso, PharmD, Stopped at 02/26/24 2117    dextrose 10 % in water (D10W) infusion, 0.3 g/kg/hr, intravenous, Once PRN, Judd Tavera DO    dextrose 50 % injection 25 g, 25 g, intravenous, q15 min PRN, Judd Tavera DO    doxycycline (Vibramycin) capsule 100 mg, 100 mg, oral, Daily, Judd Barreto MD, 100 mg at 02/26/24 1259    epoetin di-epbx (Retacrit) injection 10,000 Units, 10,000 Units, intravenous, Every Mon/Wed/Fri, Saeed Mondragon MD, 10,000 Units at 02/26/24 1558    escitalopram (Lexapro) tablet 10 mg, 10 mg, oral, Nightly, Judd Tavera DO, 10 mg at 02/26/24 2048    fludrocortisone (Florinef) tablet 0.1 mg, 0.1 mg, oral, TID, Yusuf De La Garza PA-C, 0.1 mg at 02/26/24 2100    gabapentin (Neurontin) capsule 100 mg, 100 mg, oral, TID, LILLIE Diaz, 100 mg at 02/26/24 2048    glucagon (Glucagen) injection 1 mg, 1 mg, intramuscular, q15 min PRN, Judd Tavera,     heparin 1,000 unit/mL injection 2,000 Units, 2,000 Units, intra-catheter, After Dialysis, Saeed Mondragon MD, 2,000 Units at 02/16/24 1245    heparin 1,000 unit/mL injection 2,000 Units, 2,000 Units, intra-catheter, After Dialysis, Saeed Mondragon MD, 2,000 Units at 02/16/24 1753    heparin 1,000 unit/mL injection 2,000 Units, 2,000 Units, intra-catheter, After Dialysis, Saeed CASTRO  MD Giancarlo, 1,600 Units at 02/23/24 1520    heparin 1,000 unit/mL injection 2,000 Units, 2,000 Units, intra-catheter, After Dialysis, Saeed Mondragon MD, 1,600 Units at 02/23/24 1519    heparin flush 100 unit/mL syringe 160 Units, 1.6 mL, intra-catheter, PRN, Charly Grossman MD    heparin flush 100 unit/mL syringe 160 Units, 1.6 mL, intra-catheter, PRN, Charly Grossman MD    insulin lispro (HumaLOG) injection 0-10 Units, 0-10 Units, subcutaneous, TID with meals, Judd Tavera DO, 4 Units at 02/26/24 0845    ipratropium-albuteroL (Duo-Neb) 0.5-2.5 mg/3 mL nebulizer solution 3 mL, 3 mL, nebulization, q6h while awake, Judd Barreto MD, 3 mL at 02/27/24 0801    lidocaine (Xylocaine) 10 mg/mL (1 %) injection 50 mg, 5 mL, infiltration, Once, LILLIE Mccray    midodrine (Proamatine) tablet 20 mg, 20 mg, oral, TID with meals, Joshua Hernandez PA-C, 20 mg at 02/26/24 1558    norepinephrine (Levophed) 8 mg in dextrose 5% 250 mL (0.032 mg/mL) infusion (premix), 0.01-3 mcg/kg/min, intravenous, Continuous, Yusuf De La Garza PA-C, Last Rate: 8.36 mL/hr at 02/27/24 0454, 0.06 mcg/kg/min at 02/27/24 0454    nystatin (Mycostatin) 100,000 unit/gram powder, , Topical, BID, Judd Tavera DO, Given at 02/26/24 2049    nystatin (Mycostatin) cream, , Topical, BID, LILLIE Blair, Given at 02/26/24 2049    ondansetron (Zofran) injection 4 mg, 4 mg, intravenous, q6h PRN, LILLIE Mccray, 4 mg at 02/03/24 0521    oxygen (O2) therapy, , inhalation, Continuous PRN - O2/gases, Judd Tavera DO, 2 L/min at 02/21/24 0815    pantoprazole (ProtoNix) EC tablet 40 mg, 40 mg, oral, Daily, 40 mg at 02/26/24 0827 **OR** pantoprazole (ProtoNix) injection 40 mg, 40 mg, intravenous, Daily, Yusuf De La Garza PA-C, 40 mg at 02/24/24 0858    perflutren lipid microspheres (Definity) injection 0.5-10 mL of dilution, 0.5-10 mL of dilution, intravenous, Once in imaging, Minh Shane MD     perflutren protein A microsphere (Optison) injection 0.5 mL, 0.5 mL, intravenous, Once in imaging, Minh Shane MD    psyllium (Metamucil) 3.4 gram packet 1 packet, 1 packet, oral, BID, Ronald ODETTE Casey, APRN-CNP, 1 packet at 02/25/24 2010    sennosides-docusate sodium (Judy-Colace) 8.6-50 mg per tablet 1 tablet, 1 tablet, oral, Nightly PRN, Yusuf De La Garza PA-C    sulfur hexafluoride microsphr (Lumason) injection 24.28 mg, 2 mL, intravenous, Once in imaging, Minh Shane MD    sulfur hexafluoride microsphr (Lumason) injection 24.28 mg, 2 mL, intravenous, Once in imaging, Ángel Hardy, APRN-CNP    vancomycin (Vancocin) capsule 125 mg, 125 mg, oral, BID, Iraida Lehman, APRN-CNP, 125 mg at 02/26/24 2055    zinc oxide 20 % ointment 1 Application, 1 Application, Topical, BID, Nickolas Gallego, APRN-CNP, 1 Application at 02/26/24 2052    Dietary Orders (From admission, onward)       Start     Ordered    02/07/24 1054  Adult diet Regular  Diet effective now        Question:  Diet type  Answer:  Regular    02/07/24 1053                   Estimated Needs:   Estimated Energy Needs  Total Energy Estimated Needs (kCal):  (5438-5579)  Total Estimated Energy Need per Day (kCal/kg):  (30-35)  Method for Estimating Needs: ABW    Estimated Protein Needs  Total Protein Estimated Needs (g):  (68-85)  Total Protein Estimated Needs (g/kg):  (1.2-1.5)  Method for Estimating Needs: ABW    Estimated Fluid Needs  Total Fluid Estimated Needs (mL):  (UO + 500 mL)  Method for Estimating Needs: HD and multiple wounds        Nutrition Diagnosis   Nutrition Diagnosis:  Malnutrition Diagnosis  Diagnosis Status: Ongoing    Nutrition Diagnosis  Patient has Nutrition Diagnosis: Yes  Diagnosis Status (1): Ongoing  Nutrition Diagnosis 1: Increased nutrient needs  Related to (1): Increased demand for nutrient  As Evidenced by (1): HD       Nutrition Interventions/Recommendations   Nutrition Interventions and  Recommendations:    Nutrition Prescription:  Individualized Nutrition Prescription Provided for : 9655-9764 kcals, 68-85 gm protein to be provided via diet    Nutrition Interventions:   Food and/or Nutrient Delivery Interventions  Interventions: Meals and snacks  Meals and Snacks: General healthful diet  Goal: Provide as ordered    Education Documentation  No documentation found.           Nutrition Monitoring and Evaluation   Monitoring/Evaluation:   Food/Nutrient Related History Monitoring  Monitoring and Evaluation Plan: Energy intake  Energy Intake: Estimated energy intake  Criteria: Pt to consume >/= 75% of estimated needs         Time Spent/Follow-up:   Follow Up  Time Spent (min): 20 minutes  Last Date of Nutrition Visit: 02/27/24  Nutrition Follow-Up Needed?: 5-7 days  Follow up Comment: 3/4/24

## 2024-02-27 NOTE — CARE PLAN
The patient's goals for the shift include Visit with family.    The clinical goals for the shift include pt will maintain hemodynamic stability      Problem: Pain  Goal: My pain/discomfort is manageable  Outcome: Progressing     Problem: Safety  Goal: Patient will be injury free during hospitalization  Outcome: Progressing  Goal: I will remain free of falls  Outcome: Progressing     Problem: Daily Care  Goal: Daily care needs are met  Outcome: Progressing     Problem: Psychosocial Needs  Goal: Demonstrates ability to cope with hospitalization/illness  Outcome: Progressing  Goal: Collaborate with me, my family, and caregiver to identify my specific goals  Outcome: Progressing     Problem: Discharge Barriers  Goal: My discharge needs are met  Outcome: Progressing     Problem: Fall/Injury  Goal: Not fall by end of shift  Outcome: Progressing  Goal: Be free from injury by end of the shift  Outcome: Progressing  Goal: Verbalize understanding of personal risk factors for fall in the hospital  Outcome: Progressing  Goal: Verbalize understanding of risk factor reduction measures to prevent injury from fall in the home  Outcome: Progressing  Goal: Pace activities to prevent fatigue by end of the shift  Outcome: Progressing     Problem: Skin  Goal: Decreased wound size/increased tissue granulation at next dressing change  Outcome: Progressing  Flowsheets (Taken 2/27/2024 1210)  Decreased wound size/increased tissue granulation at next dressing change: Promote sleep for wound healing  Goal: Participates in plan/prevention/treatment measures  Outcome: Progressing  Flowsheets (Taken 2/27/2024 1210)  Participates in plan/prevention/treatment measures:   Discuss with provider PT/OT consult   Elevate heels  Goal: Prevent/manage excess moisture  Outcome: Progressing  Flowsheets (Taken 2/27/2024 1210)  Prevent/manage excess moisture: Cleanse incontinence/protect with barrier cream  Goal: Prevent/minimize sheer/friction  injuries  Outcome: Progressing  Flowsheets (Taken 2/27/2024 1210)  Prevent/minimize sheer/friction injuries:   HOB 30 degrees or less   Turn/reposition every 2 hours/use positioning/transfer devices  Goal: Promote/optimize nutrition  Outcome: Progressing  Flowsheets (Taken 2/27/2024 1210)  Promote/optimize nutrition: Assist with feeding  Goal: Promote skin healing  Outcome: Progressing  Flowsheets (Taken 2/27/2024 1210)  Promote skin healing: Turn/reposition every 2 hours/use positioning/transfer devices     Problem: Pain  Goal: Takes deep breaths with improved pain control throughout the shift  Outcome: Progressing  Goal: Turns in bed with improved pain control throughout the shift  Outcome: Progressing  Goal: Performs ADL's with improved pain control throughout shift  Outcome: Progressing     Problem: Respiratory  Goal: Patent airway maintained this shift  Outcome: Progressing  Goal: Tolerate pulmonary toileting this shift  Outcome: Progressing  Goal: Verbalize decreased shortness of breath this shift  Outcome: Progressing  Goal: Increase self care and/or family involvement in next 24 hours  Outcome: Progressing  Goal: No signs of respiratory distress (eg. Use of accessory muscles. Peds grunting)  Outcome: Progressing     Problem: Diabetes  Goal: Maintain electrolyte levels within acceptable range throughout shift  Outcome: Progressing  Goal: Maintain glucose levels >70mg/dl to <250mg/dl throughout shift  Outcome: Progressing  Goal: No changes in neurological exam by end of shift  Outcome: Progressing  Goal: Learn about and adhere to nutrition recommendations by end of shift  Outcome: Progressing  Goal: Vital signs within normal range for age by end of shift  Outcome: Progressing  Goal: Increase self care and/or family involovement by end of shift  Outcome: Progressing  Goal: Receive DSME education by end of shift  Outcome: Progressing     Problem: Discharge Planning  Goal: Discharge to home or other facility with  appropriate resources  Outcome: Progressing     Problem: Chronic Conditions and Co-morbidities  Goal: Patient's chronic conditions and co-morbidity symptoms are monitored and maintained or improved  Outcome: Progressing

## 2024-02-27 NOTE — PROGRESS NOTES
[facility] Pulmonary and Critical Care Medicine   Progress Note        Subjective     Patient seen today sitting up in bed. No new complaints. Discussed goals of care with further plan to sit with her and her daughter later on this afternoon     Scheduled Medications:   albumin human, 12.5 g, intravenous, Daily  apixaban, 2.5 mg, oral, BID  cefepime, 1 g, intravenous, Nightly  doxycylcine, 100 mg, oral, Daily  epoetin di or biosimilar, 10,000 Units, intravenous, Every Mon/Wed/Fri  escitalopram, 10 mg, oral, Nightly  fludrocortisone, 0.1 mg, oral, TID  gabapentin, 100 mg, oral, TID  heparin, 2,000 Units, intra-catheter, After Dialysis  heparin, 2,000 Units, intra-catheter, After Dialysis  heparin, 2,000 Units, intra-catheter, After Dialysis  heparin, 2,000 Units, intra-catheter, After Dialysis  insulin lispro, 0-10 Units, subcutaneous, TID with meals  ipratropium-albuteroL, 3 mL, nebulization, q6h while awake  lidocaine, 5 mL, infiltration, Once  midodrine, 20 mg, oral, TID with meals  nystatin, , Topical, BID  nystatin, , Topical, BID  pantoprazole, 40 mg, oral, Daily   Or  pantoprazole, 40 mg, intravenous, Daily  perflutren lipid microspheres, 0.5-10 mL of dilution, intravenous, Once in imaging  perflutren protein A microsphere, 0.5 mL, intravenous, Once in imaging  psyllium, 1 packet, oral, BID  sulfur hexafluoride microsphr, 2 mL, intravenous, Once in imaging  sulfur hexafluoride microsphr, 2 mL, intravenous, Once in imaging  vancomycin, 125 mg, oral, BID  zinc oxide, 1 Application, Topical, BID       Continuous Medications:   norepinephrine, 0.01-3 mcg/kg/min, Last Rate: 0.06 mcg/kg/min (02/27/24 7234)       PRN Medications:   PRN medications: acetaminophen, albumin human, alteplase, ammonium lactate, benzocaine-menthoL, dextrose 10 % in water (D10W), dextrose, glucagon, heparin flush, heparin flush, ondansetron, oxygen, sennosides-docusate sodium      Objective   Vitals:  Most Recent:  Vitals:     02/27/24 0700   BP:    Pulse:    Resp:    Temp: 36.9 °C (98.4 °F)   SpO2:        24hr Min/Max:  Temp  Min: 35.8 °C (96.4 °F)  Max: 36.9 °C (98.4 °F)  Pulse  Min: 87  Max: 112  BP  Min: 41/28  Max: 116/67  Resp  Min: 13  Max: 40  SpO2  Min: 89 %  Max: 100 %    LDA:   CVC Double lumen Tunneled Right Subclavian (Active)   Earliest Known Present: 12/06/23   Lumen Type: Double lumen  CVC Type: Tunneled  Description (optional): Permacath  Orientation: Right  Location: Subclavian   Number of days: 82       PICC - Adult 02/06/24 Double lumen Right Brachial vein (Active)   Placement Date/Time: 02/06/24 1028   Hand Hygiene Completed: Yes  Catheter Time Out Checklist Completed: Yes  Size (Fr): 5  Lumen Type: Double lumen  Catheter to Vein Ratio Less Than 50%: Yes  Total Length (cm): 37 cm  External Length (cm): 28 cm  Raymon...   Number of days: 19       Rectal Tube With balloon (Active)   Placement Date/Time: 01/19/24 1830   Placed by: Peri Rosario RN  Hand Hygiene Completed: Yes  Type: With balloon   Number of days: 37           Intake/Output Summary (Last 24 hours) at 2/27/2024 0901  Last data filed at 2/27/2024 0454  Gross per 24 hour   Intake 292.57 ml   Output --   Net 292.57 ml           Physical exam:    Physical Exam  Constitutional:       General: She is not in acute distress.     Appearance: She is not toxic-appearing.   HENT:      Head: Normocephalic and atraumatic.      Nose: Nose normal.      Mouth/Throat:      Pharynx: Oropharynx is clear.   Eyes:      Extraocular Movements: Extraocular movements intact.   Neck:      Comments: No visualized masses  Cardiovascular:      Rate and Rhythm: Normal rate and regular rhythm.      Heart sounds: No murmur heard.     No friction rub. No gallop.   Pulmonary:      Effort: No respiratory distress.      Breath sounds: Normal breath sounds. No wheezing, rhonchi or rales.   Abdominal:      General: There is no distension.      Palpations: Abdomen is soft.      Tenderness: There is  no abdominal tenderness. There is no guarding.   Musculoskeletal:         General: No tenderness.      Right lower leg: Edema present.      Left lower leg: Edema present.   Skin:     General: Skin is warm and dry.      Findings: Lesion (Multiple wounds on her legs) present.   Neurological:      General: No focal deficit present.      Mental Status: She is alert. Mental status is at baseline.      Motor: Weakness present.   Psychiatric:         Mood and Affect: Mood normal.          Lab/Radiology/Diagnostic Review:    All labs and Imaging have been personally reviewed.       Assessment/Plan       73 YOF with h/o Afib, ESRD, C. Diff infection, recurrent pleural effusions, and MRSA bacteremia with MV vegetation who initially presented with anemia, fever and worsening sacral and lower extremities wounds. In the ED found to be hypotensive, anemic. Received IVF, antibiotics and admitted to the ICU with the diagnosis of septic shock. Course complicated by AMS, due to worsening shock, requiring vasopressors. Since then patient remains hypotensive on norepinephrine at variable doses. Had thoracentesis done that showed transudative effusion. Also vascular, general surgery and podiatry were consulted for pressure ulcers.  Blood pressure management has been complicated and patient has been started on Midodrine with the dose increasing to 15 mg TID and Florinef.  Patient also received several courses of antibiotics. Given stable need for Levophed, [facility].       Neuro:  #Acute Metabolic Encephalopathy   -Waxing and waning    Cardiovascular  #Septic Shock   -Maintain MAPs>65  -Continue to titrate pressors    -Continue midodrine mg TID     Pulmonary:  #PNA; Resolved     GI:  -Advance diet as tolerated    Renal:   #ESRD ON HD   #HypoNatremia   -Nephrology following and directing  -Tunneled HD catheter in place    Endocrine:  #Hypotension with suspicion of adrenal  insufficiency  - Continue Florinef   - BGM with SSI     Heme/Onc:  #Anemia   - Maintain Goal Hgb > 7     ID:  #Multiple Wounds   IV vanc last dose today with HD per renal dosing  IV Cefipime; last day will be tomorrow  Chronic Doxy for suppression therapy  PO Vanc for cdiff   -Culture Data: Wound cx 2/10: PSAG, Proteus, MRSA     Skin/MSK:  #Multiple wounds  -Currently on antibiotics  -Has been evaluated by podiatry, vascular surgery    #GOC  -Will continue to have goals of care discussions with her and the family in terms of disposition as patient is chronically ill.    ICU CHECK LIST:   Antimicrobials: Vanco/Cefipime and Doxy   Oxygen: RA  Drips: Levophed   Feeding: Advance diet as tolerated  Fluids: -  Analgesia: -  Sedation: -   Thromboprophylaxis: SCDs and SQH   Ulcer prophylaxis: PPI   Glycemic control: BGM with SSI   Bowel care: PRN   Indwelling catheters: FMS   Lines: PIVs, Tunneled double lumen, brachial picc   Dispo: MICU  Code Status: FULL CODE      I have personally spent 35 minutes of critical care time, exclusive of time spent on any procedures, in evaluation and management of this critically ill patient’s condition mentioned above in the assessment and plan.     Denisse Oneal MD  Pulmonary & Critical Care Attending   P:[id number]    Please excuse any typographical or unwanted errors within this documentation as voice recognition software was used to dictate this note.

## 2024-02-27 NOTE — PROGRESS NOTES
CONSULT PROGRESS NOTES    SERVICE DATE: 2/27/2024   SERVICE TIME: 12:01 PM    CONSULTING SERVICE: Nephrology    ASSESSMENT AND PLAN   73-year-old dialysis patient in the ICU for prolonged hospitalization with chronic hypotension.  1.  ESRD  2.  Hypotension  3.  Hyponatremia  4.  Hypokalemia  5.  Anemia and chronic kidney disease     She remains dependent on norepinephrine infusion.  We are continuing with thrice weekly hemodialysis.  Use [facility] today for 3.25 hours with attempted 2 L volume removal, may need to increase the pressors for treatment.  Using low-temperature dialysate.  Using maximum dose midodrine.  Using maximum dose erythropoietin stimulating agents.  Difficult disposition, have tried numerous ways to even get her out of the intensive care unit to no avail.  Case discussed extensively with Dr. Oneal. I will follow her.    SUBJECTIVE  INTERVAL HPI: No new issues or concerns.  Blood pressure remains low, she remains dependent on norepinephrine.  She received 2 L volume removal with hemodialysis yesterday.  She again believes it is Wednesday.    MEDICATIONS:  albumin human, 12.5 g, intravenous, Daily  apixaban, 2.5 mg, oral, BID  cefepime, 1 g, intravenous, Nightly  doxycylcine, 100 mg, oral, Daily  epoetin di or biosimilar, 10,000 Units, intravenous, Every Mon/Wed/Fri  escitalopram, 10 mg, oral, Nightly  fludrocortisone, 0.1 mg, oral, TID  gabapentin, 100 mg, oral, TID  heparin, 2,000 Units, intra-catheter, After Dialysis  heparin, 2,000 Units, intra-catheter, After Dialysis  heparin, 2,000 Units, intra-catheter, After Dialysis  heparin, 2,000 Units, intra-catheter, After Dialysis  insulin lispro, 0-10 Units, subcutaneous, TID with meals  ipratropium-albuteroL, 3 mL, nebulization, q6h while awake  lidocaine, 5 mL, infiltration, Once  midodrine, 20 mg, oral, TID with meals  nystatin, , Topical, BID  nystatin, , Topical, BID  pantoprazole, 40 mg, oral, Daily   Or  pantoprazole, 40 mg,  intravenous, Daily  perflutren lipid microspheres, 0.5-10 mL of dilution, intravenous, Once in imaging  perflutren protein A microsphere, 0.5 mL, intravenous, Once in imaging  psyllium, 1 packet, oral, BID  sulfur hexafluoride microsphr, 2 mL, intravenous, Once in imaging  sulfur hexafluoride microsphr, 2 mL, intravenous, Once in imaging  vancomycin, 125 mg, oral, BID  zinc oxide, 1 Application, Topical, BID       norepinephrine, 0.01-3 mcg/kg/min, Last Rate: 0.06 mcg/kg/min (02/27/24 4598)       PRN medications: acetaminophen, albumin human, alteplase, ammonium lactate, benzocaine-menthoL, dextrose 10 % in water (D10W), dextrose, glucagon, heparin flush, heparin flush, ondansetron, oxygen, sennosides-docusate sodium     OBJECTIVE  PHYSICAL EXAM:   Heart Rate:  []   Temp:  [35.8 °C (96.4 °F)-36.9 °C (98.4 °F)]   Resp:  [16-40]   BP: ()/(23-78)   Weight:  [109 kg (240 lb 8.4 oz)]   SpO2:  [89 %-100 %]   Body mass index is 43.99 kg/m².  This is a chronically ill-appearing white woman, seems older than her known age  Very pale skin  Moist mucosa  Somewhat confused, but appropriate affect  Regular heart rate  Breathing is unlabored  She has no major abdominal distention  She has bilateral peripheral edema present  Internal jugular tunneled hemodialysis catheter in place  Right-sided PICC in place  Rectal tube in place  No Amos catheter    DATA:   Labs:  Results for orders placed or performed during the hospital encounter of 01/19/24 (from the past 96 hour(s))   POCT GLUCOSE   Result Value Ref Range    POCT Glucose 164 (H) 74 - 99 mg/dL   POCT GLUCOSE   Result Value Ref Range    POCT Glucose 239 (H) 74 - 99 mg/dL   CBC   Result Value Ref Range    WBC 13.3 (H) 4.4 - 11.3 x10*3/uL    nRBC 0.0 0.0 - 0.0 /100 WBCs    RBC 2.91 (L) 4.00 - 5.20 x10*6/uL    Hemoglobin 9.0 (L) 12.0 - 16.0 g/dL    Hematocrit 29.1 (L) 36.0 - 46.0 %     80 - 100 fL    MCH 30.9 26.0 - 34.0 pg    MCHC 30.9 (L) 32.0 - 36.0 g/dL     RDW 19.8 (H) 11.5 - 14.5 %    Platelets 209 150 - 450 x10*3/uL   Basic metabolic panel   Result Value Ref Range    Glucose 281 (H) 65 - 99 mg/dL    Sodium 129 (L) 133 - 145 mmol/L    Potassium 3.7 3.4 - 5.1 mmol/L    Chloride 96 (L) 97 - 107 mmol/L    Bicarbonate 19 (L) 24 - 31 mmol/L    Urea Nitrogen 26 (H) 8 - 25 mg/dL    Creatinine 2.50 (H) 0.40 - 1.60 mg/dL    eGFR 20 (L) >60 mL/min/1.73m*2    Calcium 8.9 8.5 - 10.4 mg/dL    Anion Gap 14 <=19 mmol/L   Magnesium   Result Value Ref Range    Magnesium 1.60 1.60 - 3.10 mg/dL   POCT GLUCOSE   Result Value Ref Range    POCT Glucose 262 (H) 74 - 99 mg/dL   POCT GLUCOSE   Result Value Ref Range    POCT Glucose 250 (H) 74 - 99 mg/dL   POCT GLUCOSE   Result Value Ref Range    POCT Glucose 170 (H) 74 - 99 mg/dL   POCT GLUCOSE   Result Value Ref Range    POCT Glucose 153 (H) 74 - 99 mg/dL   CBC   Result Value Ref Range    WBC 11.5 (H) 4.4 - 11.3 x10*3/uL    nRBC 0.0 0.0 - 0.0 /100 WBCs    RBC 2.80 (L) 4.00 - 5.20 x10*6/uL    Hemoglobin 8.8 (L) 12.0 - 16.0 g/dL    Hematocrit 28.3 (L) 36.0 - 46.0 %     (H) 80 - 100 fL    MCH 31.4 26.0 - 34.0 pg    MCHC 31.1 (L) 32.0 - 36.0 g/dL    RDW 19.8 (H) 11.5 - 14.5 %    Platelets 225 150 - 450 x10*3/uL   Basic metabolic panel   Result Value Ref Range    Glucose 233 (H) 65 - 99 mg/dL    Sodium 130 (L) 133 - 145 mmol/L    Potassium 3.8 3.4 - 5.1 mmol/L    Chloride 97 97 - 107 mmol/L    Bicarbonate 17 (L) 24 - 31 mmol/L    Urea Nitrogen 31 (H) 8 - 25 mg/dL    Creatinine 3.00 (H) 0.40 - 1.60 mg/dL    eGFR 16 (L) >60 mL/min/1.73m*2    Calcium 9.2 8.5 - 10.4 mg/dL    Anion Gap 16 <=19 mmol/L   Magnesium   Result Value Ref Range    Magnesium 2.00 1.60 - 3.10 mg/dL   POCT GLUCOSE   Result Value Ref Range    POCT Glucose 191 (H) 74 - 99 mg/dL   POCT GLUCOSE   Result Value Ref Range    POCT Glucose 176 (H) 74 - 99 mg/dL   POCT GLUCOSE   Result Value Ref Range    POCT Glucose 184 (H) 74 - 99 mg/dL   CBC   Result Value Ref Range    WBC 10.8  4.4 - 11.3 x10*3/uL    nRBC 0.0 0.0 - 0.0 /100 WBCs    RBC 2.82 (L) 4.00 - 5.20 x10*6/uL    Hemoglobin 8.8 (L) 12.0 - 16.0 g/dL    Hematocrit 28.5 (L) 36.0 - 46.0 %     (H) 80 - 100 fL    MCH 31.2 26.0 - 34.0 pg    MCHC 30.9 (L) 32.0 - 36.0 g/dL    RDW 19.5 (H) 11.5 - 14.5 %    Platelets 225 150 - 450 x10*3/uL   Basic metabolic panel   Result Value Ref Range    Glucose 237 (H) 65 - 99 mg/dL    Sodium 129 (L) 133 - 145 mmol/L    Potassium 3.7 3.4 - 5.1 mmol/L    Chloride 97 97 - 107 mmol/L    Bicarbonate 16 (L) 24 - 31 mmol/L    Urea Nitrogen 37 (H) 8 - 25 mg/dL    Creatinine 3.50 (H) 0.40 - 1.60 mg/dL    eGFR 13 (L) >60 mL/min/1.73m*2    Calcium 9.1 8.5 - 10.4 mg/dL    Anion Gap 16 <=19 mmol/L   Magnesium   Result Value Ref Range    Magnesium 2.10 1.60 - 3.10 mg/dL   POCT GLUCOSE   Result Value Ref Range    POCT Glucose 205 (H) 74 - 99 mg/dL   POCT GLUCOSE   Result Value Ref Range    POCT Glucose 146 (H) 74 - 99 mg/dL   POCT GLUCOSE   Result Value Ref Range    POCT Glucose 180 (H) 74 - 99 mg/dL   CBC and Auto Differential   Result Value Ref Range    WBC 10.1 4.4 - 11.3 x10*3/uL    nRBC 0.0 0.0 - 0.0 /100 WBCs    RBC 2.88 (L) 4.00 - 5.20 x10*6/uL    Hemoglobin 8.9 (L) 12.0 - 16.0 g/dL    Hematocrit 28.1 (L) 36.0 - 46.0 %    MCV 98 80 - 100 fL    MCH 30.9 26.0 - 34.0 pg    MCHC 31.7 (L) 32.0 - 36.0 g/dL    RDW 19.4 (H) 11.5 - 14.5 %    Platelets 199 150 - 450 x10*3/uL    Neutrophils % 69.7 40.0 - 80.0 %    Immature Granulocytes %, Automated 0.4 0.0 - 0.9 %    Lymphocytes % 15.0 13.0 - 44.0 %    Monocytes % 7.8 2.0 - 10.0 %    Eosinophils % 5.7 0.0 - 6.0 %    Basophils % 1.4 0.0 - 2.0 %    Neutrophils Absolute 7.07 (H) 1.60 - 5.50 x10*3/uL    Immature Granulocytes Absolute, Automated 0.04 0.00 - 0.50 x10*3/uL    Lymphocytes Absolute 1.52 0.80 - 3.00 x10*3/uL    Monocytes Absolute 0.79 0.05 - 0.80 x10*3/uL    Eosinophils Absolute 0.58 (H) 0.00 - 0.40 x10*3/uL    Basophils Absolute 0.14 (H) 0.00 - 0.10 x10*3/uL    Basic metabolic panel   Result Value Ref Range    Glucose 195 (H) 65 - 99 mg/dL    Sodium 129 (L) 133 - 145 mmol/L    Potassium 3.6 3.4 - 5.1 mmol/L    Chloride 95 (L) 97 - 107 mmol/L    Bicarbonate 18 (L) 24 - 31 mmol/L    Urea Nitrogen 23 8 - 25 mg/dL    Creatinine 2.50 (H) 0.40 - 1.60 mg/dL    eGFR 20 (L) >60 mL/min/1.73m*2    Calcium 8.9 8.5 - 10.4 mg/dL    Anion Gap 16 <=19 mmol/L   Magnesium   Result Value Ref Range    Magnesium 1.80 1.60 - 3.10 mg/dL   POCT GLUCOSE   Result Value Ref Range    POCT Glucose 180 (H) 74 - 99 mg/dL   POCT GLUCOSE   Result Value Ref Range    POCT Glucose 200 (H) 74 - 99 mg/dL         SIGNATURE: Saeed Mondragon MD PATIENT NAME: Ayanna Gross   DATE: February 27, 2024 MRN: [id number]   TIME: 12:01 PM PAGER: [phone]

## 2024-02-27 NOTE — CARE PLAN
Problem: Pain  Goal: My pain/discomfort is manageable  Outcome: Progressing     Problem: Safety  Goal: Patient will be injury free during hospitalization  Outcome: Progressing  Goal: I will remain free of falls  Outcome: Progressing     Problem: Daily Care  Goal: Daily care needs are met  Outcome: Progressing     Problem: Psychosocial Needs  Goal: Demonstrates ability to cope with hospitalization/illness  Outcome: Progressing  Goal: Collaborate with me, my family, and caregiver to identify my specific goals  Outcome: Progressing     Problem: Discharge Barriers  Goal: My discharge needs are met  Outcome: Progressing     Problem: Fall/Injury  Goal: Not fall by end of shift  Outcome: Progressing  Goal: Be free from injury by end of the shift  Outcome: Progressing  Goal: Verbalize understanding of personal risk factors for fall in the hospital  Outcome: Progressing  Goal: Verbalize understanding of risk factor reduction measures to prevent injury from fall in the home  Outcome: Progressing  Goal: Pace activities to prevent fatigue by end of the shift  Outcome: Progressing     Problem: Skin  Goal: Decreased wound size/increased tissue granulation at next dressing change  Outcome: Progressing  Flowsheets (Taken 2/26/2024 2108)  Decreased wound size/increased tissue granulation at next dressing change:   Promote sleep for wound healing   Utilize specialty bed per algorithm  Goal: Participates in plan/prevention/treatment measures  Outcome: Progressing  Flowsheets (Taken 2/26/2024 2108)  Participates in plan/prevention/treatment measures:   Discuss with provider PT/OT consult   Elevate heels  Goal: Prevent/manage excess moisture  Outcome: Progressing  Flowsheets (Taken 2/26/2024 2108)  Prevent/manage excess moisture:   Moisturize dry skin   Cleanse incontinence/protect with barrier cream  Goal: Prevent/minimize sheer/friction injuries  Outcome: Progressing  Flowsheets (Taken 2/26/2024 2108)  Prevent/minimize  sheer/friction injuries:   Increase activity/out of bed for meals   Use pull sheet   Complete micro-shifts as needed if patient unable. Adjust patient position to relieve pressure points, not a full turn  Goal: Promote/optimize nutrition  Outcome: Progressing  Flowsheets (Taken 2/26/2024 2108)  Promote/optimize nutrition:   Monitor/record intake including meals   Offer water/supplements/favorite foods  Goal: Promote skin healing  Outcome: Progressing  Flowsheets (Taken 2/26/2024 2108)  Promote skin healing:   Assess skin/pad under line(s)/device(s)   Protective dressings over bony prominences     Problem: Pain  Goal: Takes deep breaths with improved pain control throughout the shift  Outcome: Progressing  Goal: Turns in bed with improved pain control throughout the shift  Outcome: Progressing  Goal: Performs ADL's with improved pain control throughout shift  Outcome: Progressing     Problem: Respiratory  Goal: Patent airway maintained this shift  Outcome: Progressing  Goal: Tolerate pulmonary toileting this shift  Outcome: Progressing  Goal: Verbalize decreased shortness of breath this shift  Outcome: Progressing  Goal: Increase self care and/or family involvement in next 24 hours  Outcome: Progressing  Goal: No signs of respiratory distress (eg. Use of accessory muscles. Peds grunting)  Outcome: Progressing     Problem: Diabetes  Goal: Maintain electrolyte levels within acceptable range throughout shift  Outcome: Progressing  Goal: Maintain glucose levels >70mg/dl to <250mg/dl throughout shift  Outcome: Progressing  Goal: No changes in neurological exam by end of shift  Outcome: Progressing  Goal: Learn about and adhere to nutrition recommendations by end of shift  Outcome: Progressing  Goal: Vital signs within normal range for age by end of shift  Outcome: Progressing  Goal: Increase self care and/or family involovement by end of shift  Outcome: Progressing  Goal: Receive DSME education by end of shift  Outcome:  Progressing     Problem: Discharge Planning  Goal: Discharge to home or other facility with appropriate resources  Outcome: Progressing     Problem: Chronic Conditions and Co-morbidities  Goal: Patient's chronic conditions and co-morbidity symptoms are monitored and maintained or improved  Outcome: Progressing   The patient's goals for the shift include Visit with family.    The clinical goals for the shift include Maintain hemodynamic stability    Over the shift, the patient did not make progress toward the following goals. Barriers to progression include . Recommendations to address these barriers include .

## 2024-02-27 NOTE — CARE PLAN
Problem: Pain  Goal: My pain/discomfort is manageable  Outcome: Progressing   The patient's goals for the shift include Visit with family.    The clinical goals for the shift include Maintain hemodynamic stability    Over the shift, the patient did not make progress toward the following goals. Barriers to progression include . Recommendations to address these barriers include .

## 2024-02-27 NOTE — PROGRESS NOTES
Ayanna Gross is a 73 y.o. female on day 39 of admission presenting with Septic shock (CMS/HCC).    Subjective   Interval History:    afebrile  Awake  On pressors        Review of Systems    Objective   Range of Vitals (last 24 hours)  Heart Rate:  []   Temp:  [35.8 °C (96.4 °F)-36.9 °C (98.4 °F)]   Resp:  [13-40]   BP: ()/()   Weight:  [109 kg (240 lb 8.4 oz)]   SpO2:  [89 %-100 %]   Daily Weight  02/26/24 : 109 kg (240 lb 8.4 oz)    Body mass index is 43.99 kg/m².    Physical Exam  Constitutional:       Appearance: Awake, alert  HENT:      Head: Normocephalic and atraumatic.      Nose: Nose normal.   Eyes:      Extraocular Movements: Extraocular movements intact.      Conjunctiva/sclera: Conjunctivae normal.   Cardiovascular:      Heart sounds: Normal heart sounds, S1 normal and S2 normal.   Pulmonary:      Breath sounds: Decreased breath sounds present.   Abdominal:      General: Bowel sounds are normal.      Palpations: Abdomen is soft.   Musculoskeletal:      Cervical back: Normal range of motion and neck supple.   Skin:     Comments: Bilateral posterior heel ulcers with moderate amount of granulation tissue with some eschar  Neurological:      Mental Status: She is awake, alert    Antibiotics  aspirin tablet 325 mg  acetaminophen (Tylenol) tablet 650 mg  cefepime (Maxipime) 1 g in dextrose 5 % 50 mL IV  sodium chloride 0.9 % bolus 2,229 mL  apixaban (Eliquis) tablet 2.5 mg  escitalopram (Lexapro) tablet 10 mg  febuxostat (Uloric) tablet 40 mg  fenofibrate (Triglide) tablet 160 mg  gabapentin (Neurontin) capsule 100 mg  midodrine (Proamatine) tablet 15 mg  nystatin (Mycostatin) 100,000 unit/gram powder  oxyCODONE-acetaminophen (Percocet) 5-325 mg per tablet 1 tablet  simvastatin (Zocor) tablet 20 mg  piperacillin-tazobactam-dextrose (Zosyn) IV 2.25 g  vancomycin (Vancocin) capsule 125 mg  oxygen (O2) therapy  dextrose 50 % injection 25 g  glucagon (Glucagen) injection 1 mg  dextrose 10 % in  water (D10W) infusion  insulin lispro (HumaLOG) injection 0-10 Units  nystatin (Mycostatin) 100,000 unit/gram powder 1 Application  vancomycin-diluent combo no.1 (Xellia) IVPB 1,750 mg  piperacillin-tazobactam-dextrose (Zosyn) IV 2.25 g  sodium chloride 0.9 % bolus 500 mL  norepinephrine (Levophed) 8 mg in dextrose 5% 250 mL (0.032 mg/mL) infusion (premix)  vancomycin (Vancocin) placeholder  pantoprazole (ProtoNix) EC tablet 40 mg  pantoprazole (ProtoNix) injection 40 mg  sennosides-docusate sodium (Judy-Colace) 8.6-50 mg per tablet 1 tablet  doxycycline (Vibramycin) in dextrose 5 % in water (D5W) 100 mL  mg  LORazepam (Ativan) injection 2 mg  magnesium sulfate IV 2 g  zinc oxide 20 % ointment 1 Application  nystatin (Mycostatin) cream  norepinephrine (Levophed) 8 mg in dextrose 5% 250 mL (0.032 mg/mL) infusion (premix)  heparin 1,000 unit/mL injection 2,000 Units  heparin 1,000 unit/mL injection 2,000 Units  nystatin (Mycostatin) ointment  acetaminophen (Tylenol) oral liquid 1,000 mg  albumin human 25 % solution 12.5 g  perflutren lipid microspheres (Definity) injection 0.5-10 mL of dilution  sulfur hexafluoride microsphr (Lumason) injection 24.28 mg  perflutren protein A microsphere (Optison) injection 0.5 mL  ipratropium-albuteroL (Duo-Neb) 0.5-2.5 mg/3 mL nebulizer solution 3 mL  sodium chloride 3 % nebulizer solution 3 mL  vancomycin-diluent combo no.1 (Xellia) IVPB 750 mg  sennosides-docusate sodium (Judy-Colace) 8.6-50 mg per tablet 1 tablet  acetaminophen (Tylenol) tablet 650 mg  doxycycline (Vibramycin) capsule 100 mg  magnesium oxide (Mag-Ox) tablet 400 mg  vasopressin (Vasostrict) 0.2 unit/mL infusion  norepinephrine (Levophed) 8 mg in dextrose 5% 250 mL (0.032 mg/mL) infusion (premix)  heparin 1,000 unit/mL injection 2,000 Units  heparin 1,000 unit/mL injection 2,000 Units  albumin human 25 % solution 12.5 g  vancomycin (Xellia) 1 g in 200 mL (Xellia) IVPB 1 g  ipratropium-albuteroL (Duo-Neb)  0.5-2.5 mg/3 mL nebulizer solution 3 mL  sodium chloride 3 % nebulizer solution 3 mL  albumin human 5 % infusion 12.5 g  heparin 1,000 unit/mL injection 2,000 Units  heparin 1,000 unit/mL injection 2,000 Units  vancomycin (Vancocin) capsule 125 mg  albumin human 25 % solution 12.5 g  heparin 1,000 unit/mL injection 2,000 Units  heparin 1,000 unit/mL injection 2,000 Units  ipratropium-albuteroL (Duo-Neb) 0.5-2.5 mg/3 mL nebulizer solution 3 mL  lidocaine (Xylocaine) 10 mg/mL (1 %) injection 50 mg  sodium phosphate 15 mmol in sodium chloride 0.9% 250 mL IV  sod phos di, mono-K phos mono (K Phos Neutral) tablet 250 mg  potassium, sodium phosphates (Phos-NaK) 280-160-250 mg packet 1 packet  doxycycline (Vibramycin) capsule 100 mg  fludrocortisone (Florinef) tablet 0.1 mg  gabapentin (Neurontin) capsule 100 mg  acetaminophen (Tylenol) tablet 1,000 mg  benzocaine-menthoL (Dermoplast) topical spray  vancomycin (Vancocin) capsule 125 mg  heparin 1,000 unit/mL injection 2,000 Units  heparin 1,000 unit/mL injection 2,000 Units  magnesium sulfate IV 2 g  albumin human 25 % solution 12.5 g  promethazine (Phenergan) injection 12.5 mg  ondansetron (Zofran) injection 4 mg  lidocaine (Xylocaine) 10 mg/mL (1 %) injection 50 mg  alteplase (Cathflo Activase) injection 2 mg  acetaminophen (Tylenol) tablet 650 mg  psyllium (Metamucil) 3.4 gram packet 1 packet  perflutren lipid microspheres (Definity) injection 0.5-10 mL of dilution  sulfur hexafluoride microsphr (Lumason) injection 24.28 mg  perflutren protein A microsphere (Optison) injection 0.5 mL  epoetin di-epbx (Retacrit) injection 10,000 Units  sulfur hexafluoride microsphr (Lumason) injection 24.28 mg  perflutren lipid microspheres (Definity) injection 0.5-10 mL of dilution  perflutren lipid microspheres (Definity) injection 0.5-10 mL of dilution  sulfur hexafluoride microsphr (Lumason) injection 24.28 mg  perflutren protein A microsphere (Optison) injection 0.5 mL  ammonium  lactate (Lac-Hydrin) 12 % lotion 1 Application  norepinephrine (Levophed) 8 mg in dextrose 5% 250 mL (0.032 mg/mL) infusion (premix)  fludrocortisone (Florinef) tablet 0.1 mg  albumin human 25 % solution 25 g  albumin human 25 % solution 25 g  meropenem (Merrem) 500 mg in sodium chloride 0.9 % 100 mL IV  potassium chloride 20 mEq in 100 mL IV premix  potassium chloride 20 mEq in 100 mL IV premix  magnesium sulfate IV 2 g  cefepime (Maxipime) 1 g in dextrose 5 % 50 mL IV  vancomycin-diluent combo no.1 (Xellia) IVPB 750 mg  albumin human 25 % solution 12.5 g  vancomycin (Xellia) 1 g in 200 mL (Xellia) IVPB 1 g  vancomycin (Vancocin) placeholder  vancomycin (Xellia) 1 g in 200 mL (Xellia) IVPB 1 g  midodrine (Proamatine) tablet 20 mg  vancomycin-diluent combo no.1 (Xellia) IVPB 750 mg  magnesium sulfate IV 2 g  heparin flush 100 unit/mL syringe 160 Units  heparin flush 100 unit/mL syringe 160 Units  vancomycin (Vancocin) 500 mg in dextrose 5 % 100 mL IV  albumin human 25 % solution 12.5 g  magnesium sulfate IV 2 g  cefepime (Maxipime) 1 g in dextrose 5 % 50 mL IV  vancomycin (Vancocin) placeholder  cefepime (Maxipime) 1 g in dextrose 5 % 50 mL IV  vancomycin (Vancocin) 500 mg in dextrose 5 % 100 mL IV      Relevant Results  Labs  Results from last 72 hours   Lab Units 02/27/24  0535 02/26/24  0515 02/25/24  0421   WBC AUTO x10*3/uL 10.1 10.8 11.5*   HEMOGLOBIN g/dL 8.9* 8.8* 8.8*   HEMATOCRIT % 28.1* 28.5* 28.3*   PLATELETS AUTO x10*3/uL 199 225 225   NEUTROS PCT AUTO % 69.7  --   --    LYMPHS PCT AUTO % 15.0  --   --    MONOS PCT AUTO % 7.8  --   --    EOS PCT AUTO % 5.7  --   --      Results from last 72 hours   Lab Units 02/27/24  0535 02/26/24  0515 02/25/24  0421   SODIUM mmol/L 129* 129* 130*   POTASSIUM mmol/L 3.6 3.7 3.8   CHLORIDE mmol/L 95* 97 97   CO2 mmol/L 18* 16* 17*   BUN mg/dL 23 37* 31*   CREATININE mg/dL 2.50* 3.50* 3.00*   GLUCOSE mg/dL 195* 237* 233*   CALCIUM mg/dL 8.9 9.1 9.2   ANION GAP mmol/L 16  16 16   EGFR mL/min/1.73m*2 20* 13* 16*         Estimated Creatinine Clearance: 24 mL/min (A) (by C-G formula based on SCr of 2.5 mg/dL (H)).  C-Reactive Protein   Date Value Ref Range Status   12/25/2023 5.20 (H) 0.00 - 2.00 mg/dL Final     CRP   Date Value Ref Range Status   06/23/2023 19.9 (H) 0 - 2.0 MG/DL Final     Comment:     Performed at [place] [id number]   05/22/2022 1.0 0 - 2.0 MG/DL Final     Comment:     Performed at [place] [id number]     Microbiology   reviewed  Imaging  Transthoracic Echo (TTE) Limited    Result Date: 2/7/2024           [place] [address]            Phone [phone] TRANSTHORACIC ECHOCARDIOGRAM REPORT  Patient Name:      BASIA Gray Physician:    [id number] Littlejohn DO Study Date:        2/7/2024            Ordering Provider:    [id number] LITTLEJOHN MRN/PID:           [id number]            Fellow: Accession#:        [id number]        Nurse: Date of Birth/Age: 1950 / 73 years Sonographer:          Rena Talavera ACS,                                                              YAEL, FABIAN Gender:            F                   Additional Staff: Height:            157.48 cm           Admit Date: Weight:                                Admission Status:     Inpatient - Routine BSA:               m2                  Department Location:  [facility] ICU Blood Pressure: 95 /42 mmHg Study Type:    TRANSTHORACIC ECHO (TTE) LIMITED Diagnosis/ICD: Hypotension, unspecified-I95.9; Mitral valve disorder-I05.9 Indication:    Limited to assess for MS, Hypotension CPT Codes:     Echo Limited-63065; Color Doppler-62616; Doppler Limited-51062 Patient History: Pertinent History: Hypotension, hx  of MV endocarditis, PAD, DM2 ESRD on Hd. Study Detail: The following Echo studies were performed: 2D, M-Mode, Doppler and               color flow. Technically challenging study due to poor acoustic               windows and L Breast implant. Unable to obtain suprasternal notch               view.  PHYSICIAN INTERPRETATION: Left Ventricle: Left ventricular systolic function is normal. There are no regional wall motion abnormalities. The left ventricular cavity size is normal. Left ventricular diastolic filling was not assessed. Left Atrium: The left atrium is moderate to severely dilated. Right Ventricle: The right ventricle is normal in size. There is normal right ventricular global systolic function. Right Atrium: The right atrium was not well visualized. Aortic Valve: The aortic valve was not assessed. Aortic valve regurgitation was not assessed. Mitral Valve: The mitral valve is abnormal. There is evidence of mild mitral valve stenosis. The doppler estimated mean and peak diastolic pressure gradients are 4.0 mmHg and 6.6 mmHg respectively. There is severe mitral annular calcification. There is no evidence of mitral valve regurgitation. Tricuspid Valve: The tricuspid valve was not assessed. Tricuspid regurgitation was not assessed. Pulmonic Valve: The pulmonic valve is not well visualized. The pulmonic valve regurgitation was not assessed. Pericardium: There is no pericardial effusion noted. Aorta: The aortic root was not assessed.  CONCLUSIONS:  1. Left ventricular systolic function is normal.  2. The left atrium is moderate to severely dilated.  3. There is severe mitral annular calcification. QUANTITATIVE DATA SUMMARY: LV DIASTOLIC FUNCTION:                        Normal Ranges: MV Peak E:    1.23 m/s (0.7-1.2 m/s) MV Peak A:    1.12 m/s (0.42-0.7 m/s) E/A Ratio:    1.10     (1.0-2.2) MV lateral e' 0.04 m/s MV medial e'  0.07 m/s MITRAL VALVE:                      Normal Ranges: MV Vmax:    1.28 m/s (<=1.3m/s)  MV peak P.6 mmHg (<5mmHg) MV mean P.0 mmHg (<48mmHg)  RIGHT VENTRICLE: RV Basal 2.97 cm  [id number] Herminio Lemus DO Electronically signed on 2024 at 3:50:21 PM  ** Final **     Transthoracic Echo (TTE) Limited    Result Date: 2024           [place] [id number]            Phone [phone] TRANSTHORACIC ECHOCARDIOGRAM REPORT  Patient Name:      BASIA Gray Physician:    [id number] Herminio Lemus DO Study Date:        2024            Ordering Provider:    [id number] REBECA RESENDEZ MRN/PID:           [id number]            Fellow: Accession#:        [id number]        Nurse: Date of Birth/Age: 1950 / 73 years Sonographer:          Jennifer WALL Gender:            F                   Additional Staff: Height:            157.48 cm           Admit Date:           2024 Weight:            98.88 kg            Admission Status:     Inpatient - Routine BSA:               1.98 m2             Department Location: Blood Pressure: 59 /49 mmHg Study Type:    TRANSTHORACIC ECHO (TTE) LIMITED Diagnosis/ICD: Acute on chronic diastolic (congestive) heart failure                (CHF)-I50.33 Indication:    Acute on chronic diastolic congestive heart failure CPT Codes:     Echo Limited-92005; Color Doppler-96684; Doppler Limited-47965 Patient History: Pertinent History: PAF Peripheralvascular disease low blood pressure HTN Breast                    Cancer Mixed Hyperlipidemia HF CVA Hypotension DMII CAD CKD. Study Detail: The following Echo studies were performed: 2D, M-Mode, Doppler and               color flow. Technically challenging study due to body habitus,               patient lying in supine position, poor acoustic windows, prominent               lung artifact and Breast Prosthesis. Definity used as a contrast                agent for endocardial border definition. Unable to obtain               suprasternal notch view.  PHYSICIAN INTERPRETATION: Left Ventricle: Left ventricular systolic function is normal, with an estimated ejection fraction of 60-65%. There are no regional wall motion abnormalities. The left ventricular cavity size is normal. Left ventricular diastolic filling was indeterminate. Left Atrium: The left atrium is moderately dilated. Right Ventricle: The right ventricle is normal in size. There is normal right ventricular global systolic function. Right Atrium: The right atrium is normal in size. Aortic Valve: The aortic valve is trileaflet. There is no evidence of aortic valve regurgitation. The peak instantaneous gradient of the aortic valve is 5.3 mmHg. Mitral Valve: The mitral valve is normal in structure. There is severe mitral annular calcification. There is trace mitral valve regurgitation. Tricuspid Valve: The tricuspid valve is structurally normal. There is mild tricuspid regurgitation. Pulmonic Valve: The pulmonic valve is not well visualized. The pulmonic valve regurgitation was not well visualized. Pericardium: There is no pericardial effusion noted. Aorta: The aortic root is normal.  CONCLUSIONS:  1. Left ventricular systolic function is normal with a 60-65% estimated ejection fraction.  2. The left atrium is moderately dilated.  3. There is severe mitral annular calcification. QUANTITATIVE DATA SUMMARY: 2D MEASUREMENTS:                          Normal Ranges: LAs:           3.80 cm   (2.7-4.0cm) IVSd:          0.92 cm   (0.6-1.1cm) LVPWd:         1.09 cm   (0.6-1.1cm) LVIDd:         2.98 cm   (3.9-5.9cm) LVIDs:         2.12 cm LV Mass Index: 41.2 g/m2 LV % FS        28.9 % LA VOLUME:                               Normal Ranges: LA Vol A4C:        58.3 ml    (22+/-6mL/m2) LA Vol A2C:        60.9 ml LA Vol BP:         62.1 ml LA Vol Index A4C:  29.4ml/m2 LA Vol Index A2C:  30.7 ml/m2 LA Vol Index BP:   31.3  ml/m2 LA Area A4C:       20.5 cm2 LA Area A2C:       20.1 cm2 LA Major Axis A4C: 6.1 cm LA Major Axis A2C: 5.6 cm LA Volume Index:   29.3 ml/m2 LA Vol A4C:        53.1 ml LA Vol A2C:        58.4 ml LV SYSTOLIC FUNCTION BY 2D PLANIMETRY (MOD):                     Normal Ranges: EF-A4C View: 68.2 % (>=55%) EF-A2C View: 65.9 % EF-Biplane:  67.8 % AORTIC VALVE:                         Normal Ranges: AoV Vmax:      1.15 m/s (<=1.7m/s) AoV Peak P.3 mmHg (<20mmHg) LVOT Max Shimon:  1.11 m/s (<=1.1m/s) LVOT VTI:      16.20 cm LVOT Diameter: 1.90 cm  (1.8-2.4cm) AoV Area,Vmax: 2.74 cm2 (2.5-4.5cm2) PULMONIC VALVE:                         Normal Ranges: PV Accel Time: 63 msec  (>120ms) PV Max Shimon:    1.0 m/s  (0.6-0.9m/s) PV Max P.3 mmHg  [id number] Herminio Lemus DO Electronically signed on 2024 at 7:58:25 AM  ** Final **     Assessment/Plan   Septic shock-patient on pressors  Left-sided pleural effusion   Left lower lobe pneumonia-treated  Proteus urinary tract infection-treated  History of MRSA endocarditis  History of C. 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

## 2024-02-28 NOTE — CARE PLAN
Problem: Respiratory  Goal: Patent airway maintained this shift  Outcome: Progressing  Goal: Tolerate pulmonary toileting this shift  Outcome: Progressing  Goal: Verbalize decreased shortness of breath this shift  Outcome: Progressing  Goal: No signs of respiratory distress (eg. Use of accessory muscles. Peds grunting)  Outcome: Progressing

## 2024-02-28 NOTE — PROGRESS NOTES
CONSULT PROGRESS NOTES    SERVICE DATE: 2/28/2024   SERVICE TIME: 12:13 PM    CONSULTING SERVICE: Nephrology    ASSESSMENT AND PLAN   73-year-old dialysis patient in the ICU for prolonged hospitalization with chronic hypotension.  1.  ESRD  2.  Hypotension  3.  Hyponatremia  4.  Hypokalemia  5.  Anemia and chronic kidney disease     She remains dependent on norepinephrine infusion.  We are continuing with thrice weekly hemodialysis.  Use [facility] dialysis today for 3.25 hours with attempted 2 L volume removal, may need to increase the pressors for treatment.  Using low-temperature dialysate.  Using maximum dose midodrine.  Discontinue the fludrocortisone as it is doing no good at this point.  Using maximum dose erythropoietin stimulating agents.  Difficult disposition, have tried numerous ways to even get her out of the intensive care unit to no avail.  I will follow her.    SUBJECTIVE  INTERVAL HPI: No new issues or concerns.  Blood pressure remains low, she remains dependent on norepinephrine.  She was seen during her hemodialysis treatment today.     MEDICATIONS:  apixaban, 2.5 mg, oral, BID  doxycylcine, 100 mg, oral, Daily  epoetin di or biosimilar, 10,000 Units, intravenous, Every Mon/Wed/Fri  escitalopram, 10 mg, oral, Nightly  gabapentin, 100 mg, oral, TID  heparin, 2,000 Units, intra-catheter, After Dialysis  heparin, 2,000 Units, intra-catheter, After Dialysis  insulin lispro, 0-10 Units, subcutaneous, TID with meals  ipratropium-albuteroL, 3 mL, nebulization, q6h while awake  lidocaine, 5 mL, infiltration, Once  midodrine, 20 mg, oral, TID with meals  nystatin, , Topical, BID  nystatin, , Topical, BID  pantoprazole, 40 mg, oral, Daily   Or  pantoprazole, 40 mg, intravenous, Daily  perflutren lipid microspheres, 0.5-10 mL of dilution, intravenous, Once in imaging  perflutren protein A microsphere, 0.5 mL, intravenous, Once in imaging  psyllium, 1 packet, oral, BID  sulfur hexafluoride microsphr, 2 mL,  intravenous, Once in imaging  sulfur hexafluoride microsphr, 2 mL, intravenous, Once in imaging  vancomycin, 125 mg, oral, BID  zinc oxide, 1 Application, Topical, BID       norepinephrine, 0.01-3 mcg/kg/min, Last Rate: 0.08 mcg/kg/min (02/28/24 0700)       PRN medications: acetaminophen, albumin human, alteplase, ammonium lactate, benzocaine-menthoL, dextrose 10 % in water (D10W), dextrose, glucagon, heparin flush, heparin flush, ondansetron, oxygen, sennosides-docusate sodium     OBJECTIVE  PHYSICAL EXAM:   Heart Rate:  []   Temp:  [36.5 °C (97.7 °F)-37.2 °C (99 °F)]   Resp:  [14-28]   BP: ()/()   Weight:  [106 kg (233 lb 14.5 oz)]   SpO2:  [86 %-100 %]   Body mass index is 42.78 kg/m².  This is a chronically ill-appearing white woman, seems older than her known age  Very pale skin  Moist mucosa  Somewhat confused, but appropriate affect  Regular heart rate  Breathing is unlabored  She has no major abdominal distention  She has bilateral peripheral edema present  Internal jugular tunneled hemodialysis catheter in place  Right-sided PICC in place  Rectal tube in place  No Amos catheter    DATA:   Labs:  Results for orders placed or performed during the hospital encounter of 01/19/24 (from the past 96 hour(s))   POCT GLUCOSE   Result Value Ref Range    POCT Glucose 170 (H) 74 - 99 mg/dL   POCT GLUCOSE   Result Value Ref Range    POCT Glucose 153 (H) 74 - 99 mg/dL   CBC   Result Value Ref Range    WBC 11.5 (H) 4.4 - 11.3 x10*3/uL    nRBC 0.0 0.0 - 0.0 /100 WBCs    RBC 2.80 (L) 4.00 - 5.20 x10*6/uL    Hemoglobin 8.8 (L) 12.0 - 16.0 g/dL    Hematocrit 28.3 (L) 36.0 - 46.0 %     (H) 80 - 100 fL    MCH 31.4 26.0 - 34.0 pg    MCHC 31.1 (L) 32.0 - 36.0 g/dL    RDW 19.8 (H) 11.5 - 14.5 %    Platelets 225 150 - 450 x10*3/uL   Basic metabolic panel   Result Value Ref Range    Glucose 233 (H) 65 - 99 mg/dL    Sodium 130 (L) 133 - 145 mmol/L    Potassium 3.8 3.4 - 5.1 mmol/L    Chloride 97 97 - 107  mmol/L    Bicarbonate 17 (L) 24 - 31 mmol/L    Urea Nitrogen 31 (H) 8 - 25 mg/dL    Creatinine 3.00 (H) 0.40 - 1.60 mg/dL    eGFR 16 (L) >60 mL/min/1.73m*2    Calcium 9.2 8.5 - 10.4 mg/dL    Anion Gap 16 <=19 mmol/L   Magnesium   Result Value Ref Range    Magnesium 2.00 1.60 - 3.10 mg/dL   POCT GLUCOSE   Result Value Ref Range    POCT Glucose 191 (H) 74 - 99 mg/dL   POCT GLUCOSE   Result Value Ref Range    POCT Glucose 176 (H) 74 - 99 mg/dL   POCT GLUCOSE   Result Value Ref Range    POCT Glucose 184 (H) 74 - 99 mg/dL   CBC   Result Value Ref Range    WBC 10.8 4.4 - 11.3 x10*3/uL    nRBC 0.0 0.0 - 0.0 /100 WBCs    RBC 2.82 (L) 4.00 - 5.20 x10*6/uL    Hemoglobin 8.8 (L) 12.0 - 16.0 g/dL    Hematocrit 28.5 (L) 36.0 - 46.0 %     (H) 80 - 100 fL    MCH 31.2 26.0 - 34.0 pg    MCHC 30.9 (L) 32.0 - 36.0 g/dL    RDW 19.5 (H) 11.5 - 14.5 %    Platelets 225 150 - 450 x10*3/uL   Basic metabolic panel   Result Value Ref Range    Glucose 237 (H) 65 - 99 mg/dL    Sodium 129 (L) 133 - 145 mmol/L    Potassium 3.7 3.4 - 5.1 mmol/L    Chloride 97 97 - 107 mmol/L    Bicarbonate 16 (L) 24 - 31 mmol/L    Urea Nitrogen 37 (H) 8 - 25 mg/dL    Creatinine 3.50 (H) 0.40 - 1.60 mg/dL    eGFR 13 (L) >60 mL/min/1.73m*2    Calcium 9.1 8.5 - 10.4 mg/dL    Anion Gap 16 <=19 mmol/L   Magnesium   Result Value Ref Range    Magnesium 2.10 1.60 - 3.10 mg/dL   POCT GLUCOSE   Result Value Ref Range    POCT Glucose 205 (H) 74 - 99 mg/dL   POCT GLUCOSE   Result Value Ref Range    POCT Glucose 146 (H) 74 - 99 mg/dL   POCT GLUCOSE   Result Value Ref Range    POCT Glucose 180 (H) 74 - 99 mg/dL   CBC and Auto Differential   Result Value Ref Range    WBC 10.1 4.4 - 11.3 x10*3/uL    nRBC 0.0 0.0 - 0.0 /100 WBCs    RBC 2.88 (L) 4.00 - 5.20 x10*6/uL    Hemoglobin 8.9 (L) 12.0 - 16.0 g/dL    Hematocrit 28.1 (L) 36.0 - 46.0 %    MCV 98 80 - 100 fL    MCH 30.9 26.0 - 34.0 pg    MCHC 31.7 (L) 32.0 - 36.0 g/dL    RDW 19.4 (H) 11.5 - 14.5 %    Platelets 199 150 -  450 x10*3/uL    Neutrophils % 69.7 40.0 - 80.0 %    Immature Granulocytes %, Automated 0.4 0.0 - 0.9 %    Lymphocytes % 15.0 13.0 - 44.0 %    Monocytes % 7.8 2.0 - 10.0 %    Eosinophils % 5.7 0.0 - 6.0 %    Basophils % 1.4 0.0 - 2.0 %    Neutrophils Absolute 7.07 (H) 1.60 - 5.50 x10*3/uL    Immature Granulocytes Absolute, Automated 0.04 0.00 - 0.50 x10*3/uL    Lymphocytes Absolute 1.52 0.80 - 3.00 x10*3/uL    Monocytes Absolute 0.79 0.05 - 0.80 x10*3/uL    Eosinophils Absolute 0.58 (H) 0.00 - 0.40 x10*3/uL    Basophils Absolute 0.14 (H) 0.00 - 0.10 x10*3/uL   Basic metabolic panel   Result Value Ref Range    Glucose 195 (H) 65 - 99 mg/dL    Sodium 129 (L) 133 - 145 mmol/L    Potassium 3.6 3.4 - 5.1 mmol/L    Chloride 95 (L) 97 - 107 mmol/L    Bicarbonate 18 (L) 24 - 31 mmol/L    Urea Nitrogen 23 8 - 25 mg/dL    Creatinine 2.50 (H) 0.40 - 1.60 mg/dL    eGFR 20 (L) >60 mL/min/1.73m*2    Calcium 8.9 8.5 - 10.4 mg/dL    Anion Gap 16 <=19 mmol/L   Magnesium   Result Value Ref Range    Magnesium 1.80 1.60 - 3.10 mg/dL   POCT GLUCOSE   Result Value Ref Range    POCT Glucose 180 (H) 74 - 99 mg/dL   POCT GLUCOSE   Result Value Ref Range    POCT Glucose 200 (H) 74 - 99 mg/dL   POCT GLUCOSE   Result Value Ref Range    POCT Glucose 180 (H) 74 - 99 mg/dL   POCT GLUCOSE   Result Value Ref Range    POCT Glucose 182 (H) 74 - 99 mg/dL   CBC and Auto Differential   Result Value Ref Range    WBC 10.0 4.4 - 11.3 x10*3/uL    nRBC 0.0 0.0 - 0.0 /100 WBCs    RBC 3.01 (L) 4.00 - 5.20 x10*6/uL    Hemoglobin 9.4 (L) 12.0 - 16.0 g/dL    Hematocrit 29.8 (L) 36.0 - 46.0 %    MCV 99 80 - 100 fL    MCH 31.2 26.0 - 34.0 pg    MCHC 31.5 (L) 32.0 - 36.0 g/dL    RDW 19.2 (H) 11.5 - 14.5 %    Platelets 247 150 - 450 x10*3/uL    Neutrophils % 62.0 40.0 - 80.0 %    Immature Granulocytes %, Automated 0.4 0.0 - 0.9 %    Lymphocytes % 20.3 13.0 - 44.0 %    Monocytes % 10.3 2.0 - 10.0 %    Eosinophils % 5.8 0.0 - 6.0 %    Basophils % 1.2 0.0 - 2.0 %     Neutrophils Absolute 6.22 (H) 1.60 - 5.50 x10*3/uL    Immature Granulocytes Absolute, Automated 0.04 0.00 - 0.50 x10*3/uL    Lymphocytes Absolute 2.04 0.80 - 3.00 x10*3/uL    Monocytes Absolute 1.03 (H) 0.05 - 0.80 x10*3/uL    Eosinophils Absolute 0.58 (H) 0.00 - 0.40 x10*3/uL    Basophils Absolute 0.12 (H) 0.00 - 0.10 x10*3/uL   Basic metabolic panel   Result Value Ref Range    Glucose 251 (H) 65 - 99 mg/dL    Sodium 131 (L) 133 - 145 mmol/L    Potassium 3.6 3.4 - 5.1 mmol/L    Chloride 96 (L) 97 - 107 mmol/L    Bicarbonate 19 (L) 24 - 31 mmol/L    Urea Nitrogen 28 (H) 8 - 25 mg/dL    Creatinine 3.10 (H) 0.40 - 1.60 mg/dL    eGFR 16 (L) >60 mL/min/1.73m*2    Calcium 9.1 8.5 - 10.4 mg/dL    Anion Gap 16 <=19 mmol/L   Magnesium   Result Value Ref Range    Magnesium 1.90 1.60 - 3.10 mg/dL   POCT GLUCOSE   Result Value Ref Range    POCT Glucose 219 (H) 74 - 99 mg/dL   POCT GLUCOSE   Result Value Ref Range    POCT Glucose 159 (H) 74 - 99 mg/dL         SIGNATURE: Saeed Mondragon MD PATIENT NAME: Ayanna Gross   DATE: February 28, 2024 MRN: [id number]   TIME: 12:13 PM PAGER: [phone]

## 2024-02-28 NOTE — PROGRESS NOTES
[facility] sent updates to [facility] to see if insurance will approve at this point. Will follow.      02/28/24 1527   Discharge Planning   Home or Post Acute Services Other (Comment)  (TBD)   Type of Post Acute Facility Services Rehab   Patient expects to be discharged to: TBD   Does the patient need discharge transport arranged? Yes   RoundTrip coordination needed? Yes

## 2024-02-28 NOTE — CONSULTS
Consults    Reason For Consult  Bilateral LE pressure ulcerations     History Of Present Illness  Ayanna Gross is a 73 y.o. female presenting with bilateral Heel ulceration and left calf ulceration with exposed tendon. Patient has been undergoing palliative wound care with podiatry and wound care team including daily dressing changes and aggressive offloading. Patient alert and oriented at this time       Past Medical History  She has a past medical history of Asthma, CAD (coronary artery disease), CHF (congestive heart failure) (CMS/[facility]), Chronic kidney disease on chronic dialysis (CMS/[facility]), Hypertension, Personal history of diseases of the blood and blood-forming organs and certain disorders involving the immune mechanism, Sleep apnea, and Type 2 diabetes mellitus without complications (CMS/[facility]) (09/15/2022).    Surgical History  She has a past surgical history that includes Other surgical history (11/18/2013); Carpal tunnel release (11/18/2013); Umbilical hernia repair (11/18/2013); Tonsillectomy (11/18/2013); Hand surgery (11/18/2013); Other surgical history (11/18/2013); Other surgical history (11/18/2013); Other surgical history (04/04/2022); Other surgical history (04/04/2022); Other surgical history (04/04/2022); Mastectomy, partial (04/10/2014); [place] lymph node biopsy (04/10/2014); US guided percutaneous placement (6/29/2023); and MR angio head wo IV contrast (8/29/2023).     Social History  She reports that she has never smoked. She has never used smokeless tobacco. She reports that she does not currently use alcohol. She reports that she does not use drugs.    Family History  Family History   Problem Relation Name Age of Onset    Lymphoma Mother      Prostate cancer Father          passed away fabiana pako 2017        Allergies  Patient has no known allergies.    Review of Systems    REVIEW OF SYSTEMS  GENERAL:  Negative for malaise, significant weight loss, fever  HEENT:  No changes in hearing or  vision, no nose bleeds or other nasal problems and Negative for frequent or significant headaches  NECK:  Negative for lumps, goiter, pain and significant neck swelling  RESPIRATORY:  Negative for cough, wheezing and shortness of breath  CARDIOVASCULAR:  Negative for chest pain, leg swelling and palpitations  GI:  Negative for abdominal discomfort, blood in stools or black stools and change in bowel habits  :  Negative for dysuria, frequency and incontinence  MUSCULOSKELETAL:  Negative for joint pain or swelling, back pain, and muscle pain.  SKIN:  Negative for lesions, rash, and itching  PSYCH:  Negative for sleep disturbance, mood disorder and recent psychosocial stressors  HEMATOLOGY/LYMPHOLOGY:  Negative for prolonged bleeding, bruising easily, and swollen nodes.  ENDOCRINE:  Negative for cold or heat intolerance, polyuria, polydipsia and goiter  NEURO: Negative, denies any burning, tingling or numbness     Objective:   Vasc: DP and PT pulses are palpable bilateral.  CFT is less than 3 seconds bilateral.  Skin temperature is warm to cool proximal to distal bilateral.      Neuro:  Light touch is diminished to the foot bilateral.  Protective sensation is intact to the foot when tested with the 5.07 SWM bilateral.  There is no clonus noted.  The hallux is downgoing bilateral.      Derm: Full-thickness heel ulcerations appreciable to the right and left lower extremity.  Overlying wound bed to the left and right heels appears to be 20/80 fibrogranular in appearance with scant serous drainage. No appreciable malodor. No evidence of periwound erythema warmth or appreciable drainage.  No evidence of proximal lymphangitic streaking.     Exposed tendon left LE at lower calf measuring 5x3x1.2cm in dimension. No evidence of jeaneth-wound erythema or edema and no evidence of proximal lymphangitic streaking. Wound base is dry and covered with well adhered eschar.         Physical Exam     Last Recorded Vitals  Blood pressure  "88/63, pulse 104, temperature 36.8 °C (98.2 °F), temperature source Temporal, resp. rate 26, height 1.575 m (5' 2\"), weight 105 kg (231 lb 11.3 oz), SpO2 95 %.    Relevant Results       Assessment/Plan   Septic shock   PVD  ESRD  DM II c complication   Right heel ulceration level of tendon   Left heel ulceration level of tendon   left calf ulceration level of tendon   Hx previous digital amputations     Patient seen and evaluated   I explained to patient concern that underlying wounds lack adequate local blood supply for wound healing potential   Additionally her extensive co-morbidities further diminish wound healing expectations.  I explained that it is not likely these wound will heal and noted we will continue to treat with palliative wound care for the time being    Both heel wounds appear to be increasing in tissue granulation with decreased eschar formation   However the exposed left achilles tendon has become increasingly necrotic due to underlying pressure  I recommend continued local conservative wound cared at this time   Wounds do not appear to be grossly infected as drainage is minimal and there is no evidenced for surrounding jeaneth-wound erythema, edema, warmth or malodor at this time   Radiographs 1/22 demonstrate no evidence of acute osseous pathology nor do the wound communicate to the level of bone   Concern for operative I&D as the extensive calcific occlusions in her LE would prevent adequate wound healing potential  Will continue to follow     I spent 30 minutes in the professional and overall care of this patient.          "

## 2024-02-28 NOTE — PROGRESS NOTES
Occupational Therapy                 Therapy Communication Note    Patient Name: Ayanna Gross  MRN: [id number]  Today's Date: 2/28/2024     Discipline: Occupational Therapy    Missed Visit Reason: Missed Visit Reason: Cancel (pt receiving dialysis at this time-will hold OT)    Missed Time: Cancel    Comment:

## 2024-02-28 NOTE — PROGRESS NOTES
Ayanna Gross is a 73 y.o. female on day 40 of admission presenting with Septic shock (CMS/HCC).    Subjective   Interval History:   Awake, alert  Family at bedside  Discussed with nursing   Afebrile, no chills  Remains On pressors  NO shortness of breath or chest pain              Objective   Range of Vitals (last 24 hours)  Heart Rate:  []   Temp:  [36.5 °C (97.7 °F)-37.2 °C (99 °F)]   Resp:  [14-28]   BP: ()/()   Weight:  [106 kg (233 lb 14.5 oz)]   SpO2:  [86 %-100 %]   Daily Weight  02/28/24 : 106 kg (233 lb 14.5 oz)    Body mass index is 42.78 kg/m².    Physical Exam  Constitutional:       Appearance: Normal appearance.   HENT:      Head: Normocephalic and atraumatic.      Nose: Nose normal.      Mouth/Throat:      Mouth: Mucous membranes are moist.      Pharynx: Oropharynx is clear.   Eyes:      Conjunctiva/sclera: Conjunctivae normal.   Cardiovascular:      Rate and Rhythm: Normal rate and regular rhythm.   Pulmonary:      Comments: Decreased breath sounds bilateral   Abdominal:      General: Bowel sounds are normal.      Palpations: Abdomen is soft.   Musculoskeletal:         General: Normal range of motion.      Cervical back: Normal range of motion and neck supple.   Skin:     Comments: Bilateral posterior heel ulcers with moderate amount of granulation tissue with some eschar     Neurological:      Mental Status: She is alert.      Comments: Awake, alert   Psychiatric:         Mood and Affect: Mood normal.         Behavior: Behavior normal.           Antibiotics  aspirin tablet 325 mg  acetaminophen (Tylenol) tablet 650 mg  cefepime (Maxipime) 1 g in dextrose 5 % 50 mL IV  sodium chloride 0.9 % bolus 2,229 mL  apixaban (Eliquis) tablet 2.5 mg  escitalopram (Lexapro) tablet 10 mg  febuxostat (Uloric) tablet 40 mg  fenofibrate (Triglide) tablet 160 mg  gabapentin (Neurontin) capsule 100 mg  midodrine (Proamatine) tablet 15 mg  nystatin (Mycostatin) 100,000 unit/gram  powder  oxyCODONE-acetaminophen (Percocet) 5-325 mg per tablet 1 tablet  simvastatin (Zocor) tablet 20 mg  piperacillin-tazobactam-dextrose (Zosyn) IV 2.25 g  vancomycin (Vancocin) capsule 125 mg  oxygen (O2) therapy  dextrose 50 % injection 25 g  glucagon (Glucagen) injection 1 mg  dextrose 10 % in water (D10W) infusion  insulin lispro (HumaLOG) injection 0-10 Units  nystatin (Mycostatin) 100,000 unit/gram powder 1 Application  vancomycin-diluent combo no.1 (Xellia) IVPB 1,750 mg  piperacillin-tazobactam-dextrose (Zosyn) IV 2.25 g  sodium chloride 0.9 % bolus 500 mL  norepinephrine (Levophed) 8 mg in dextrose 5% 250 mL (0.032 mg/mL) infusion (premix)  vancomycin (Vancocin) placeholder  pantoprazole (ProtoNix) EC tablet 40 mg  pantoprazole (ProtoNix) injection 40 mg  sennosides-docusate sodium (Judy-Colace) 8.6-50 mg per tablet 1 tablet  doxycycline (Vibramycin) in dextrose 5 % in water (D5W) 100 mL  mg  LORazepam (Ativan) injection 2 mg  magnesium sulfate IV 2 g  zinc oxide 20 % ointment 1 Application  nystatin (Mycostatin) cream  norepinephrine (Levophed) 8 mg in dextrose 5% 250 mL (0.032 mg/mL) infusion (premix)  heparin 1,000 unit/mL injection 2,000 Units  heparin 1,000 unit/mL injection 2,000 Units  nystatin (Mycostatin) ointment  acetaminophen (Tylenol) oral liquid 1,000 mg  albumin human 25 % solution 12.5 g  perflutren lipid microspheres (Definity) injection 0.5-10 mL of dilution  sulfur hexafluoride microsphr (Lumason) injection 24.28 mg  perflutren protein A microsphere (Optison) injection 0.5 mL  ipratropium-albuteroL (Duo-Neb) 0.5-2.5 mg/3 mL nebulizer solution 3 mL  sodium chloride 3 % nebulizer solution 3 mL  vancomycin-diluent combo no.1 (Xellia) IVPB 750 mg  sennosides-docusate sodium (Judy-Colace) 8.6-50 mg per tablet 1 tablet  acetaminophen (Tylenol) tablet 650 mg  doxycycline (Vibramycin) capsule 100 mg  magnesium oxide (Mag-Ox) tablet 400 mg  vasopressin (Vasostrict) 0.2 unit/mL  infusion  norepinephrine (Levophed) 8 mg in dextrose 5% 250 mL (0.032 mg/mL) infusion (premix)  heparin 1,000 unit/mL injection 2,000 Units  heparin 1,000 unit/mL injection 2,000 Units  albumin human 25 % solution 12.5 g  vancomycin (Xellia) 1 g in 200 mL (Xellia) IVPB 1 g  ipratropium-albuteroL (Duo-Neb) 0.5-2.5 mg/3 mL nebulizer solution 3 mL  sodium chloride 3 % nebulizer solution 3 mL  albumin human 5 % infusion 12.5 g  heparin 1,000 unit/mL injection 2,000 Units  heparin 1,000 unit/mL injection 2,000 Units  vancomycin (Vancocin) capsule 125 mg  albumin human 25 % solution 12.5 g  heparin 1,000 unit/mL injection 2,000 Units  heparin 1,000 unit/mL injection 2,000 Units  ipratropium-albuteroL (Duo-Neb) 0.5-2.5 mg/3 mL nebulizer solution 3 mL  lidocaine (Xylocaine) 10 mg/mL (1 %) injection 50 mg  sodium phosphate 15 mmol in sodium chloride 0.9% 250 mL IV  sod phos di, mono-K phos mono (K Phos Neutral) tablet 250 mg  potassium, sodium phosphates (Phos-NaK) 280-160-250 mg packet 1 packet  doxycycline (Vibramycin) capsule 100 mg  fludrocortisone (Florinef) tablet 0.1 mg  gabapentin (Neurontin) capsule 100 mg  acetaminophen (Tylenol) tablet 1,000 mg  benzocaine-menthoL (Dermoplast) topical spray  vancomycin (Vancocin) capsule 125 mg  heparin 1,000 unit/mL injection 2,000 Units  heparin 1,000 unit/mL injection 2,000 Units  magnesium sulfate IV 2 g  albumin human 25 % solution 12.5 g  promethazine (Phenergan) injection 12.5 mg  ondansetron (Zofran) injection 4 mg  lidocaine (Xylocaine) 10 mg/mL (1 %) injection 50 mg  alteplase (Cathflo Activase) injection 2 mg  acetaminophen (Tylenol) tablet 650 mg  psyllium (Metamucil) 3.4 gram packet 1 packet  perflutren lipid microspheres (Definity) injection 0.5-10 mL of dilution  sulfur hexafluoride microsphr (Lumason) injection 24.28 mg  perflutren protein A microsphere (Optison) injection 0.5 mL  epoetin di-epbx (Retacrit) injection 10,000 Units  sulfur hexafluoride microsphr  (Lumason) injection 24.28 mg  perflutren lipid microspheres (Definity) injection 0.5-10 mL of dilution  perflutren lipid microspheres (Definity) injection 0.5-10 mL of dilution  sulfur hexafluoride microsphr (Lumason) injection 24.28 mg  perflutren protein A microsphere (Optison) injection 0.5 mL  ammonium lactate (Lac-Hydrin) 12 % lotion 1 Application  norepinephrine (Levophed) 8 mg in dextrose 5% 250 mL (0.032 mg/mL) infusion (premix)  fludrocortisone (Florinef) tablet 0.1 mg  albumin human 25 % solution 25 g  albumin human 25 % solution 25 g  meropenem (Merrem) 500 mg in sodium chloride 0.9 % 100 mL IV  potassium chloride 20 mEq in 100 mL IV premix  potassium chloride 20 mEq in 100 mL IV premix  magnesium sulfate IV 2 g  cefepime (Maxipime) 1 g in dextrose 5 % 50 mL IV  vancomycin-diluent combo no.1 (Xellia) IVPB 750 mg  albumin human 25 % solution 12.5 g  vancomycin (Xellia) 1 g in 200 mL (Xellia) IVPB 1 g  vancomycin (Vancocin) placeholder  vancomycin (Xellia) 1 g in 200 mL (Xellia) IVPB 1 g  midodrine (Proamatine) tablet 20 mg  vancomycin-diluent combo no.1 (Xellia) IVPB 750 mg  magnesium sulfate IV 2 g  heparin flush 100 unit/mL syringe 160 Units  heparin flush 100 unit/mL syringe 160 Units  vancomycin (Vancocin) 500 mg in dextrose 5 % 100 mL IV  albumin human 25 % solution 12.5 g  magnesium sulfate IV 2 g  cefepime (Maxipime) 1 g in dextrose 5 % 50 mL IV  vancomycin (Vancocin) placeholder  cefepime (Maxipime) 1 g in dextrose 5 % 50 mL IV  vancomycin (Vancocin) 500 mg in dextrose 5 % 100 mL IV      Relevant Results  Labs  Results from last 72 hours   Lab Units 02/28/24  0634 02/27/24  0535 02/26/24  0515   WBC AUTO x10*3/uL 10.0 10.1 10.8   HEMOGLOBIN g/dL 9.4* 8.9* 8.8*   HEMATOCRIT % 29.8* 28.1* 28.5*   PLATELETS AUTO x10*3/uL 247 199 225   NEUTROS PCT AUTO % 62.0 69.7  --    LYMPHS PCT AUTO % 20.3 15.0  --    MONOS PCT AUTO % 10.3 7.8  --    EOS PCT AUTO % 5.8 5.7  --        Results from last 72 hours    Lab Units 02/28/24  0634 02/27/24  0535 02/26/24  0515   SODIUM mmol/L 131* 129* 129*   POTASSIUM mmol/L 3.6 3.6 3.7   CHLORIDE mmol/L 96* 95* 97   CO2 mmol/L 19* 18* 16*   BUN mg/dL 28* 23 37*   CREATININE mg/dL 3.10* 2.50* 3.50*   GLUCOSE mg/dL 251* 195* 237*   CALCIUM mg/dL 9.1 8.9 9.1   ANION GAP mmol/L 16 16 16   EGFR mL/min/1.73m*2 16* 20* 13*           Estimated Creatinine Clearance: 19.1 mL/min (A) (by C-G formula based on SCr of 3.1 mg/dL (H)).  C-Reactive Protein   Date Value Ref Range Status   12/25/2023 5.20 (H) 0.00 - 2.00 mg/dL Final     CRP   Date Value Ref Range Status   06/23/2023 19.9 (H) 0 - 2.0 MG/DL Final     Comment:     Performed at [facility] [id number]   05/22/2022 1.0 0 - 2.0 MG/DL Final     Comment:     Performed at [facility] [address]     Microbiology   reviewed  Imaging  Transthoracic Echo (TTE) Limited    Result Date: 2/7/2024           [place] [address]            Phone [phone] TRANSTHORACIC ECHOCARDIOGRAM REPORT  Patient Name:      BASIA Gray Physician:    [id number] Littlejohn DO Study Date:        2/7/2024            Ordering Provider:    [id number] LITTLEJOHN MRN/PID:           [id number]            Fellow: Accession#:        [id number]        Nurse: Date of Birth/Age: 1950 / 73 years Sonographer:          Rena QUARLES,                                                              FABIAN CONLEY Gender:            F                   Additional Staff: Height:            157.48 cm           Admit Date: Weight:                                Admission Status:     Inpatient - Routine BSA:               m2                  Department Location:  [facility] Blood Pressure:  95 /42 mmHg Study Type:    TRANSTHORACIC ECHO (TTE) LIMITED Diagnosis/ICD: Hypotension, unspecified-I95.9; Mitral valve disorder-I05.9 Indication:    Limited to assess for MS, Hypotension CPT Codes:     Echo Limited-90095; Color Doppler-34281; Doppler Limited-50853 Patient History: Pertinent History: Hypotension, hx of MV endocarditis, PAD, DM2 ESRD on Hd. Study Detail: The following Echo studies were performed: 2D, M-Mode, Doppler and               color flow. Technically challenging study due to poor acoustic               windows and L Breast implant. Unable to obtain suprasternal notch               view.  PHYSICIAN INTERPRETATION: Left Ventricle: Left ventricular systolic function is normal. There are no regional wall motion abnormalities. The left ventricular cavity size is normal. Left ventricular diastolic filling was not assessed. Left Atrium: The left atrium is moderate to severely dilated. Right Ventricle: The right ventricle is normal in size. There is normal right ventricular global systolic function. Right Atrium: The right atrium was not well visualized. Aortic Valve: The aortic valve was not assessed. Aortic valve regurgitation was not assessed. Mitral Valve: The mitral valve is abnormal. There is evidence of mild mitral valve stenosis. The doppler estimated mean and peak diastolic pressure gradients are 4.0 mmHg and 6.6 mmHg respectively. There is severe mitral annular calcification. There is no evidence of mitral valve regurgitation. Tricuspid Valve: The tricuspid valve was not assessed. Tricuspid regurgitation was not assessed. Pulmonic Valve: The pulmonic valve is not well visualized. The pulmonic valve regurgitation was not assessed. Pericardium: There is no pericardial effusion noted. Aorta: The aortic root was not assessed.  CONCLUSIONS:  1. Left ventricular systolic function is normal.  2. The left atrium is moderate to severely dilated.  3. There is severe mitral annular calcification.  QUANTITATIVE DATA SUMMARY: LV DIASTOLIC FUNCTION:                        Normal Ranges: MV Peak E:    1.23 m/s (0.7-1.2 m/s) MV Peak A:    1.12 m/s (0.42-0.7 m/s) E/A Ratio:    1.10     (1.0-2.2) MV lateral e' 0.04 m/s MV medial e'  0.07 m/s MITRAL VALVE:                      Normal Ranges: MV Vmax:    1.28 m/s (<=1.3m/s) MV peak P.6 mmHg (<5mmHg) MV mean P.0 mmHg (<48mmHg)  RIGHT VENTRICLE: RV Basal 2.97 cm  [id number] Lemus DO Electronically signed on 2024 at 3:50:21 PM  ** Final **     Transthoracic Echo (TTE) Limited    Result Date: 2024           [place] [address]            Phone [phone] TRANSTHORACIC ECHOCARDIOGRAM REPORT  Patient Name:      BASIA Gray Physician:    [id number] Lemus DO Study Date:        2024            Ordering Provider:    [id number] REBECA RESENDEZ MRN/PID:           [id number]            Fellow: Accession#:        [id number]        Nurse: Date of Birth/Age: 1950 / 73 years Sonographer:          Jennifer WALL Gender:            F                   Additional Staff: Height:            157.48 cm           Admit Date:           2024 Weight:            98.88 kg            Admission Status:     Inpatient - Routine BSA:               1.98 m2             Department Location: Blood Pressure: 59 /49 mmHg Study Type:    TRANSTHORACIC ECHO (TTE) LIMITED Diagnosis/ICD: Acute on chronic diastolic (congestive) heart failure                (CHF)-I50.33 Indication:    Acute on chronic diastolic congestive heart failure CPT Codes:     Echo Limited-80402; Color Doppler-73609; Doppler Limited-70279 Patient History: Pertinent History: PAF Peripheralvascular disease low blood pressure HTN Breast                    Cancer Mixed Hyperlipidemia HF CVA Hypotension DMII CAD CKD. Study  Detail: The following Echo studies were performed: 2D, M-Mode, Doppler and               color flow. Technically challenging study due to body habitus,               patient lying in supine position, poor acoustic windows, prominent               lung artifact and Breast Prosthesis. Definity used as a contrast               agent for endocardial border definition. Unable to obtain               suprasternal notch view.  PHYSICIAN INTERPRETATION: Left Ventricle: Left ventricular systolic function is normal, with an estimated ejection fraction of 60-65%. There are no regional wall motion abnormalities. The left ventricular cavity size is normal. Left ventricular diastolic filling was indeterminate. Left Atrium: The left atrium is moderately dilated. Right Ventricle: The right ventricle is normal in size. There is normal right ventricular global systolic function. Right Atrium: The right atrium is normal in size. Aortic Valve: The aortic valve is trileaflet. There is no evidence of aortic valve regurgitation. The peak instantaneous gradient of the aortic valve is 5.3 mmHg. Mitral Valve: The mitral valve is normal in structure. There is severe mitral annular calcification. There is trace mitral valve regurgitation. Tricuspid Valve: The tricuspid valve is structurally normal. There is mild tricuspid regurgitation. Pulmonic Valve: The pulmonic valve is not well visualized. The pulmonic valve regurgitation was not well visualized. Pericardium: There is no pericardial effusion noted. Aorta: The aortic root is normal.  CONCLUSIONS:  1. Left ventricular systolic function is normal with a 60-65% estimated ejection fraction.  2. The left atrium is moderately dilated.  3. There is severe mitral annular calcification. QUANTITATIVE DATA SUMMARY: 2D MEASUREMENTS:                          Normal Ranges: LAs:           3.80 cm   (2.7-4.0cm) IVSd:          0.92 cm   (0.6-1.1cm) LVPWd:         1.09 cm   (0.6-1.1cm) LVIDd:         2.98  cm   (3.9-5.9cm) LVIDs:         2.12 cm LV Mass Index: 41.2 g/m2 LV % FS        28.9 % LA VOLUME:                               Normal Ranges: LA Vol A4C:        58.3 ml    (22+/-6mL/m2) LA Vol A2C:        60.9 ml LA Vol BP:         62.1 ml LA Vol Index A4C:  29.4ml/m2 LA Vol Index A2C:  30.7 ml/m2 LA Vol Index BP:   31.3 ml/m2 LA Area A4C:       20.5 cm2 LA Area A2C:       20.1 cm2 LA Major Axis A4C: 6.1 cm LA Major Axis A2C: 5.6 cm LA Volume Index:   29.3 ml/m2 LA Vol A4C:        53.1 ml LA Vol A2C:        58.4 ml LV SYSTOLIC FUNCTION BY 2D PLANIMETRY (MOD):                     Normal Ranges: EF-A4C View: 68.2 % (>=55%) EF-A2C View: 65.9 % EF-Biplane:  67.8 % AORTIC VALVE:                         Normal Ranges: AoV Vmax:      1.15 m/s (<=1.7m/s) AoV Peak P.3 mmHg (<20mmHg) LVOT Max Shimon:  1.11 m/s (<=1.1m/s) LVOT VTI:      16.20 cm LVOT Diameter: 1.90 cm  (1.8-2.4cm) AoV Area,Vmax: 2.74 cm2 (2.5-4.5cm2) PULMONIC VALVE:                         Normal Ranges: PV Accel Time: 63 msec  (>120ms) PV Max Shimon:    1.0 m/s  (0.6-0.9m/s) PV Max P.3 mmHg  [id number] Herminio Lemus DO Electronically signed on 2024 at 7:58:25 AM  ** Final **     Assessment/Plan   Septic shock-patient on pressors  Left-sided pleural effusion   Left lower lobe pneumonia-treated  Proteus urinary tract infection-treated  History of MRSA endocarditis  History of C. 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

## 2024-02-28 NOTE — CARE PLAN
Problem: Pain  Goal: My pain/discomfort is manageable  Outcome: Progressing     Problem: Safety  Goal: Patient will be injury free during hospitalization  Outcome: Progressing  Goal: I will remain free of falls  Outcome: Progressing     Problem: Daily Care  Goal: Daily care needs are met  Outcome: Progressing     Problem: Psychosocial Needs  Goal: Demonstrates ability to cope with hospitalization/illness  Outcome: Progressing  Goal: Collaborate with me, my family, and caregiver to identify my specific goals  Outcome: Progressing     Problem: Discharge Barriers  Goal: My discharge needs are met  Outcome: Progressing     Problem: Fall/Injury  Goal: Not fall by end of shift  Outcome: Progressing  Goal: Be free from injury by end of the shift  Outcome: Progressing  Goal: Verbalize understanding of personal risk factors for fall in the hospital  Outcome: Progressing  Goal: Verbalize understanding of risk factor reduction measures to prevent injury from fall in the home  Outcome: Progressing  Goal: Pace activities to prevent fatigue by end of the shift  Outcome: Progressing     Problem: Skin  Goal: Decreased wound size/increased tissue granulation at next dressing change  Outcome: Progressing  Goal: Participates in plan/prevention/treatment measures  Outcome: Progressing  Goal: Prevent/manage excess moisture  Outcome: Progressing  Goal: Prevent/minimize sheer/friction injuries  Outcome: Progressing  Goal: Promote/optimize nutrition  Outcome: Progressing  Goal: Promote skin healing  Outcome: Progressing     Problem: Pain  Goal: Takes deep breaths with improved pain control throughout the shift  Outcome: Progressing  Goal: Turns in bed with improved pain control throughout the shift  Outcome: Progressing  Goal: Performs ADL's with improved pain control throughout shift  Outcome: Progressing     Problem: Respiratory  Goal: Patent airway maintained this shift  Outcome: Progressing  Goal: Tolerate pulmonary toileting this  shift  Outcome: Progressing  Goal: Verbalize decreased shortness of breath this shift  Outcome: Progressing  Goal: Increase self care and/or family involvement in next 24 hours  Outcome: Progressing  Goal: No signs of respiratory distress (eg. Use of accessory muscles. Peds grunting)  Outcome: Progressing     Problem: Diabetes  Goal: Maintain electrolyte levels within acceptable range throughout shift  Outcome: Progressing  Goal: Maintain glucose levels >70mg/dl to <250mg/dl throughout shift  Outcome: Progressing  Goal: No changes in neurological exam by end of shift  Outcome: Progressing  Goal: Learn about and adhere to nutrition recommendations by end of shift  Outcome: Progressing  Goal: Vital signs within normal range for age by end of shift  Outcome: Progressing  Goal: Increase self care and/or family involovement by end of shift  Outcome: Progressing  Goal: Receive DSME education by end of shift  Outcome: Progressing     Problem: Discharge Planning  Goal: Discharge to home or other facility with appropriate resources  Outcome: Progressing     Problem: Chronic Conditions and Co-morbidities  Goal: Patient's chronic conditions and co-morbidity symptoms are monitored and maintained or improved  Outcome: Progressing   The patient's goals for the shift include Visit with family.    The clinical goals for the shift include pt will maintain hemodynamic stability    Over the shift, the patient did not make progress toward the following goals. Barriers to progression include . Recommendations to address these barriers include .

## 2024-02-28 NOTE — NURSING NOTE
Arterial and venous temporary dialysis catheter dwelled with 1.6ml heparin per protocol/orders. Cap and curos cap applied.        Rashad Lopez RN.

## 2024-02-28 NOTE — PROGRESS NOTES
[facility] Pulmonary and Critical Care Medicine   Progress Note        Subjective     Patient seen today sitting up in bed.  Sister and daughter at bedside with her.  Had a long discussion with her and the patient's family in regards to plans moving forward.  Patient's family would like to try for LTAC again.  Advised him that this was denied by insurance previously but I will contact  to attempt placement again.  If this does fail we will need to have a plan B which would likely be going home with hospice.  Plan for rest of the patient's daughters to come in this afternoon for a meeting discussing this plan.      Scheduled Medications:   apixaban, 2.5 mg, oral, BID  doxycylcine, 100 mg, oral, Daily  epoetin di or biosimilar, 10,000 Units, intravenous, Every Mon/Wed/Fri  escitalopram, 10 mg, oral, Nightly  gabapentin, 100 mg, oral, TID  heparin, 2,000 Units, intra-catheter, After Dialysis  heparin, 2,000 Units, intra-catheter, After Dialysis  insulin lispro, 0-10 Units, subcutaneous, TID with meals  ipratropium-albuteroL, 3 mL, nebulization, q6h while awake  lidocaine, 5 mL, infiltration, Once  midodrine, 20 mg, oral, TID with meals  nystatin, , Topical, BID  nystatin, , Topical, BID  pantoprazole, 40 mg, oral, Daily   Or  pantoprazole, 40 mg, intravenous, Daily  perflutren lipid microspheres, 0.5-10 mL of dilution, intravenous, Once in imaging  perflutren protein A microsphere, 0.5 mL, intravenous, Once in imaging  psyllium, 1 packet, oral, BID  sulfur hexafluoride microsphr, 2 mL, intravenous, Once in imaging  sulfur hexafluoride microsphr, 2 mL, intravenous, Once in imaging  vancomycin, 125 mg, oral, BID  zinc oxide, 1 Application, Topical, BID       Continuous Medications:   norepinephrine, 0.01-3 mcg/kg/min, Last Rate: 0.08 mcg/kg/min (02/28/24 0701)       PRN Medications:   PRN medications: acetaminophen, albumin human, alteplase, ammonium lactate, benzocaine-menthoL, dextrose 10 % in  water (D10W), dextrose, glucagon, heparin flush, heparin flush, ondansetron, oxygen, sennosides-docusate sodium      Objective   Vitals:  Most Recent:  Vitals:    02/28/24 1200   BP: 79/54   Pulse: 98   Resp: 21   Temp:    SpO2: 97%       24hr Min/Max:  Temp  Min: 36.5 °C (97.7 °F)  Max: 37.2 °C (99 °F)  Pulse  Min: 97  Max: 114  BP  Min: 46/27  Max: 139/128  Resp  Min: 14  Max: 28  SpO2  Min: 86 %  Max: 98 %    LDA:   CVC Double lumen Tunneled Right Subclavian (Active)   Earliest Known Present: 12/06/23   Lumen Type: Double lumen  CVC Type: Tunneled  Description (optional): Permacath  Orientation: Right  Location: Subclavian   Number of days: 82       PICC - Adult 02/06/24 Double lumen Right Brachial vein (Active)   Placement Date/Time: 02/06/24 1028   Hand Hygiene Completed: Yes  Catheter Time Out Checklist Completed: Yes  Size (Fr): 5  Lumen Type: Double lumen  Catheter to Vein Ratio Less Than 50%: Yes  Total Length (cm): 37 cm  External Length (cm): 28 cm  Raymon...   Number of days: 19       Rectal Tube With balloon (Active)   Placement Date/Time: 01/19/24 1830   Placed by: Peri Rosario RN  Hand Hygiene Completed: Yes  Type: With balloon   Number of days: 37           Intake/Output Summary (Last 24 hours) at 2/28/2024 1235  Last data filed at 2/28/2024 1200  Gross per 24 hour   Intake 714.83 ml   Output 700 ml   Net 14.83 ml           Physical exam:    Physical Exam  Constitutional:       General: She is not in acute distress.     Appearance: She is not toxic-appearing.   HENT:      Head: Normocephalic and atraumatic.      Nose: Nose normal.      Mouth/Throat:      Pharynx: Oropharynx is clear.   Eyes:      Extraocular Movements: Extraocular movements intact.   Neck:      Comments: No visualized masses  Cardiovascular:      Rate and Rhythm: Normal rate and regular rhythm.      Heart sounds: No murmur heard.     No friction rub. No gallop.   Pulmonary:      Effort: No respiratory distress.      Breath sounds:  Normal breath sounds. No wheezing, rhonchi or rales.   Abdominal:      General: There is no distension.      Palpations: Abdomen is soft.      Tenderness: There is no abdominal tenderness. There is no guarding.   Musculoskeletal:         General: No tenderness.      Right lower leg: Edema present.      Left lower leg: Edema present.   Skin:     General: Skin is warm and dry.      Findings: Lesion (Multiple wounds on her legs) present.   Neurological:      General: No focal deficit present.      Mental Status: She is alert. Mental status is at baseline.      Motor: Weakness present.   Psychiatric:         Mood and Affect: Mood normal.          Lab/Radiology/Diagnostic Review:    All labs and Imaging have been personally reviewed.       Assessment/Plan       73 YOF with h/o Afib, ESRD, C. Diff infection, recurrent pleural effusions, and MRSA bacteremia with MV vegetation who initially presented with anemia, fever and worsening sacral and lower extremities wounds. In the ED found to be hypotensive, anemic. Received IVF, antibiotics and admitted to the ICU with the diagnosis of septic shock. Course complicated by AMS, due to worsening shock, requiring vasopressors. Since then patient remains hypotensive on norepinephrine at variable doses. Had thoracentesis done that showed transudative effusion. Also vascular, general surgery and podiatry were consulted for pressure ulcers.  Blood pressure management has been complicated and patient has been started on Midodrine with the dose increasing to 15 mg TID and Florinef.  Patient also received several courses of antibiotics. Given stable need for Levophed, attempt made to transfer to [facility], however, denied by insurance.       Neuro:  #Acute Metabolic Encephalopathy   -Waxing and waning; she was alert and oriented x 3 this morning and answering questions appropriately.    Cardiovascular  #Shock  -Maintain MAPs>65  -Continue to titrate pressors  down  -Continue midodrine mg TID      Pulmonary:  #PNA; Resolved     GI:  -Advance diet as tolerated    Renal:   #ESRD ON HD   #HypoNatremia   -Nephrology following and directingHD; plan for tablo today   -Tunneled HD catheter in place    Endocrine:  #Hypotension with suspicion of adrenal insufficiency  - Continue Florinef   - BGM with SSI     Heme/Onc:  #Anemia   - Maintain Goal Hgb > 7     ID:  #Multiple Wounds   IV vanc Completed   IV Cefipime Completed   Chronic Doxy for suppression therapy  PO Vanc for cdiff   -Culture Data: Wound cx 2/10: PSAG, Proteus, MRSA     Skin/MSK:  #Multiple wounds  -Management as abocve  -Has been evaluated by podiatry, vascular surgery    #GOC  -Will continue to have goals of care discussions with her and the family in terms of disposition as patient is chronically ill.  - Will attempt LTAC placement again   - Plan for meeting this afternoon with all of her daughters to develop a plan B if ltac placement fails     ICU CHECK LIST:   Antimicrobials: Doxy   Oxygen: RA  Drips: Levophed   Feeding: Advance diet as tolerated  Fluids: -  Analgesia: -  Sedation: -   Thromboprophylaxis: SCDs and SQH   Ulcer prophylaxis: PPI   Glycemic control: BGM with SSI   Bowel care: PRN   Indwelling catheters: FMS   Lines: PIVs, Tunneled double lumen, brachial picc   Dispo: MICU  Code Status: FULL CODE      I have personally spent 45 minutes of critical care time, exclusive of time spent on any procedures, in evaluation and management of this critically ill patient’s condition mentioned above in the assessment and plan.     Denisse Oneal MD  Pulmonary & Critical Care Attending   [id number]    Please excuse any typographical or unwanted errors within this documentation as voice recognition software was used to dictate this note.

## 2024-02-28 NOTE — CARE PLAN
Problem: Respiratory  Goal: No signs of respiratory distress (eg. Use of accessory muscles. Peds grunting)  Outcome: Progressing  Goal: Minimize anxiety/maximize coping throughout shift  Outcome: Progressing

## 2024-02-28 NOTE — PROGRESS NOTES
Physical Therapy                 Therapy Communication Note    Patient Name: Ayanna Gross  MRN: [id number]  Today's Date: 2/28/2024     Discipline: Physical Therapy    Missed Visit Reason: Missed Visit Reason: Cancel (Pt currently receiving dialysis at bedside.)    Missed Time: Cancel    Comment:

## 2024-02-29 NOTE — CARE PLAN
Problem: Respiratory  Goal: Patent airway maintained this shift  Outcome: Progressing  Goal: Tolerate pulmonary toileting this shift  Outcome: Progressing  Goal: Verbalize decreased shortness of breath this shift  Outcome: Progressing  Goal: Increase self care and/or family involvement in next 24 hours  Outcome: Progressing  Goal: No signs of respiratory distress (eg. Use of accessory muscles. Peds grunting)  Outcome: Progressing  Goal: Clear secretions with interventions this shift  Outcome: Progressing  Goal: Minimize anxiety/maximize coping throughout shift  Outcome: Progressing  Goal: Minimal/no exertional discomfort or dyspnea this shift  Outcome: Progressing

## 2024-02-29 NOTE — CARE PLAN
Problem: Pain  Goal: My pain/discomfort is manageable  2/29/2024 0954 by Rashad Lopez RN  Outcome: Progressing  2/29/2024 0953 by Rashad Lopez RN  Outcome: Progressing     Problem: Safety  Goal: Patient will be injury free during hospitalization  2/29/2024 0954 by Rashad Lopez RN  Outcome: Progressing  2/29/2024 0953 by Rashad Lopez RN  Outcome: Progressing  Goal: I will remain free of falls  2/29/2024 0954 by Rashad Lopez RN  Outcome: Progressing  2/29/2024 0953 by Rashad Lopez RN  Outcome: Progressing     Problem: Daily Care  Goal: Daily care needs are met  2/29/2024 0954 by Rashad Lopez RN  Outcome: Progressing  2/29/2024 0953 by Rashad Lopez RN  Outcome: Progressing     Problem: Psychosocial Needs  Goal: Demonstrates ability to cope with hospitalization/illness  2/29/2024 0954 by Rashad Lopez RN  Outcome: Progressing  2/29/2024 0953 by Rashad Lopez RN  Outcome: Progressing  Goal: Collaborate with me, my family, and caregiver to identify my specific goals  2/29/2024 0954 by Rashad Lopez RN  Outcome: Progressing  2/29/2024 0953 by Rashad Lopez RN  Outcome: Progressing     Problem: Discharge Barriers  Goal: My discharge needs are met  2/29/2024 0954 by Rashad Lopez RN  Outcome: Progressing  2/29/2024 0953 by Rashad Lopez RN  Outcome: Progressing     Problem: Fall/Injury  Goal: Not fall by end of shift  2/29/2024 0954 by Rashad Lopez RN  Outcome: Progressing  2/29/2024 0953 by Rashad Lopez RN  Outcome: Progressing  Goal: Be free from injury by end of the shift  2/29/2024 0954 by Rashad Lopez RN  Outcome: Progressing  2/29/2024 0953 by Rashad Lopez RN  Outcome: Progressing  Goal: Verbalize understanding of personal risk factors for fall in the hospital  2/29/2024 0954 by Rashad Lopez RN  Outcome: Progressing  2/29/2024 0953 by Rashad MORGAN  FLAQUITO Lopez  Outcome: Progressing  Goal: Verbalize understanding of risk factor reduction measures to prevent injury from fall in the home  2/29/2024 0954 by Rashad Lopez RN  Outcome: Progressing  2/29/2024 0953 by Rashad Lopez RN  Outcome: Progressing  Goal: Pace activities to prevent fatigue by end of the shift  2/29/2024 0954 by Rashad Lopez RN  Outcome: Progressing  2/29/2024 0953 by Rashad Lopez RN  Outcome: Progressing     Problem: Skin  Goal: Decreased wound size/increased tissue granulation at next dressing change  2/29/2024 0954 by Rashad Lopez RN  Outcome: Progressing  2/29/2024 0953 by Rashad Lopez RN  Outcome: Progressing  Goal: Participates in plan/prevention/treatment measures  2/29/2024 0954 by Rashad Lopez RN  Outcome: Progressing  2/29/2024 0953 by Rashad Lopez RN  Outcome: Progressing  Goal: Prevent/manage excess moisture  2/29/2024 0954 by Rashad Lopez RN  Outcome: Progressing  2/29/2024 0953 by Rashad Lopez RN  Outcome: Progressing  Goal: Prevent/minimize sheer/friction injuries  2/29/2024 0954 by Rashad Lopez RN  Outcome: Progressing  2/29/2024 0953 by Rashad Lopez RN  Outcome: Progressing  Goal: Promote/optimize nutrition  2/29/2024 0954 by Rashad Lopez RN  Outcome: Progressing  2/29/2024 0953 by Rashad Lopez RN  Outcome: Progressing  Goal: Promote skin healing  2/29/2024 0954 by Rashad Lopez RN  Outcome: Progressing  2/29/2024 0953 by Rashad Lopez RN  Outcome: Progressing     Problem: Pain  Goal: Takes deep breaths with improved pain control throughout the shift  2/29/2024 0954 by Rashad Lopez RN  Outcome: Progressing  2/29/2024 0953 by Rashad Lopez RN  Outcome: Progressing  Goal: Turns in bed with improved pain control throughout the shift  2/29/2024 0954 by Rashad Lopez RN  Outcome: Progressing  2/29/2024 0953 by Rashad  EDDIE Lopez RN  Outcome: Progressing  Goal: Performs ADL's with improved pain control throughout shift  2/29/2024 0954 by Rashad Lopez RN  Outcome: Progressing  2/29/2024 0953 by Rashad Lopez RN  Outcome: Progressing     Problem: Respiratory  Goal: Patent airway maintained this shift  2/29/2024 0954 by Rashad Lopez RN  Outcome: Progressing  2/29/2024 0953 by Rashad Lopez RN  Outcome: Progressing  Goal: Tolerate pulmonary toileting this shift  2/29/2024 0954 by Rashad Lopez RN  Outcome: Progressing  2/29/2024 0953 by Rashad Lopez RN  Outcome: Progressing  Goal: Verbalize decreased shortness of breath this shift  2/29/2024 0954 by Rashad Lopez RN  Outcome: Progressing  2/29/2024 0953 by Rashad Lopez RN  Outcome: Progressing  Goal: Increase self care and/or family involvement in next 24 hours  2/29/2024 0954 by Rashad Lopez RN  Outcome: Progressing  2/29/2024 0953 by Rashad Lopez RN  Outcome: Progressing  Goal: No signs of respiratory distress (eg. Use of accessory muscles. Peds grunting)  2/29/2024 0954 by Rashad Lopez RN  Outcome: Progressing  2/29/2024 0953 by Rashad Lopez RN  Outcome: Progressing  Goal: Clear secretions with interventions this shift  2/29/2024 0954 by Rashad Lopez RN  Outcome: Progressing  2/29/2024 0953 by Rashad Lopez RN  Outcome: Progressing  Goal: Minimize anxiety/maximize coping throughout shift  2/29/2024 0954 by Rashad Lopez RN  Outcome: Progressing  2/29/2024 0953 by Rashad Lopez RN  Outcome: Progressing  Goal: Minimal/no exertional discomfort or dyspnea this shift  2/29/2024 0954 by Rashad Lopez RN  Outcome: Progressing  2/29/2024 0953 by Rashad Lopez RN  Outcome: Progressing  Goal: Tolerate mechanical ventilation evidenced by VS/agitation level this shift  2/29/2024 0954 by Rashad Lopez RN  Outcome:  Progressing  2/29/2024 0953 by Rashad Lopez RN  Outcome: Progressing  Goal: Wean oxygen to maintain O2 saturation per order/standard this shift  2/29/2024 0954 by Rashad Lopez RN  Outcome: Progressing  2/29/2024 0953 by Rashad Lopez RN  Outcome: Progressing     Problem: Diabetes  Goal: Maintain electrolyte levels within acceptable range throughout shift  2/29/2024 0954 by Rashad Lopez RN  Outcome: Progressing  2/29/2024 0953 by Rashad Lopez RN  Outcome: Progressing  Goal: Maintain glucose levels >70mg/dl to <250mg/dl throughout shift  2/29/2024 0954 by Rashad Lopez RN  Outcome: Progressing  2/29/2024 0953 by Rashad Lopez RN  Outcome: Progressing  Goal: No changes in neurological exam by end of shift  2/29/2024 0954 by Rashad Lopez RN  Outcome: Progressing  2/29/2024 0953 by Rashad Lopez RN  Outcome: Progressing  Goal: Learn about and adhere to nutrition recommendations by end of shift  2/29/2024 0954 by Rashad Lopez RN  Outcome: Progressing  2/29/2024 0953 by Rashad Lopez RN  Outcome: Progressing  Goal: Vital signs within normal range for age by end of shift  2/29/2024 0954 by Rashad Lopez RN  Outcome: Progressing  2/29/2024 0953 by Rashad Lopez RN  Outcome: Progressing  Goal: Increase self care and/or family involovement by end of shift  2/29/2024 0954 by Rashad Lopez RN  Outcome: Progressing  2/29/2024 0953 by Rashad Lopez RN  Outcome: Progressing  Goal: Receive DSME education by end of shift  2/29/2024 0954 by Rashad Lopez RN  Outcome: Progressing  2/29/2024 0953 by Rashad Lopez RN  Outcome: Progressing     Problem: Discharge Planning  Goal: Discharge to home or other facility with appropriate resources  2/29/2024 0954 by Rashad Lopez RN  Outcome: Progressing  2/29/2024 0953 by Rashad Lopez RN  Outcome: Progressing     Problem: Chronic  Conditions and Co-morbidities  Goal: Patient's chronic conditions and co-morbidity symptoms are monitored and maintained or improved  2/29/2024 0954 by Rashad Lopez RN  Outcome: Progressing  2/29/2024 0953 by Rashad Lopez RN  Outcome: Progressing   The patient's goals for the shift include Visit with family.    The clinical goals for the shift include pt will tolerate vasopressor weaning    Over the shift, the patient did not make progress toward the following goals. Barriers to progression include . Recommendations to address these barriers include .

## 2024-02-29 NOTE — PROGRESS NOTES
"Ayanna Gross is a 73 y.o. female on day 41 of admission presenting with Septic shock (CMS/HCC).    Subjective   Remains on low dose norepi drip 0.05mcg/kg/min. Remains awake and alert. HD today goal 2L removal.   Approved for LTACH today after peer to peer meeting.    Objective     Physical Exam  Constitutional:       Appearance: She is not toxic-appearing.   HENT:      Head: Normocephalic.      Nose: Nose normal.      Mouth/Throat:      Mouth: Mucous membranes are moist.   Eyes:      Pupils: Pupils are equal, round, and reactive to light.   Cardiovascular:      Rate and Rhythm: Normal rate and regular rhythm.   Pulmonary:      Effort: Pulmonary effort is normal. No respiratory distress.   Abdominal:      General: Abdomen is flat. There is no distension.      Palpations: Abdomen is soft.      Tenderness: There is no abdominal tenderness.   Musculoskeletal:         General: No swelling or tenderness.      Right lower leg: Edema present.      Left lower leg: Edema present.   Skin:     General: Skin is warm.   Neurological:      General: No focal deficit present.      Mental Status: She is alert.   Psychiatric:         Mood and Affect: Mood normal.         Thought Content: Thought content normal.         Last Recorded Vitals  Blood pressure 100/80, pulse 104, temperature 36.5 °C (97.7 °F), temperature source Temporal, resp. rate 24, height 1.575 m (5' 2\"), weight 106 kg (233 lb 7.5 oz), SpO2 95 %.  Intake/Output last 3 Shifts:  I/O last 3 completed shifts:  In: 607 (6.5 mL/kg) [P.O.:120; I.V.:437 (4.7 mL/kg); IV Piggyback:50]  Out: 2700 (29 mL/kg) [Other:2000; Stool:700]  Dosing Weight: 93 kg     Relevant Results  Scheduled medications  apixaban, 2.5 mg, oral, BID  doxycylcine, 100 mg, oral, Daily  epoetin di or biosimilar, 10,000 Units, intravenous, Every Mon/Wed/Fri  escitalopram, 10 mg, oral, Nightly  gabapentin, 100 mg, oral, TID  heparin, 2,000 Units, intra-catheter, After Dialysis  heparin, 2,000 Units, " intra-catheter, After Dialysis  insulin lispro, 0-10 Units, subcutaneous, TID with meals  ipratropium-albuteroL, 3 mL, nebulization, q6h while awake  lidocaine, 5 mL, infiltration, Once  midodrine, 30 mg, oral, TID with meals  nystatin, , Topical, BID  nystatin, , Topical, BID  pantoprazole, 40 mg, oral, Daily   Or  pantoprazole, 40 mg, intravenous, Daily  perflutren lipid microspheres, 0.5-10 mL of dilution, intravenous, Once in imaging  perflutren protein A microsphere, 0.5 mL, intravenous, Once in imaging  psyllium, 1 packet, oral, BID  sulfur hexafluoride microsphr, 2 mL, intravenous, Once in imaging  sulfur hexafluoride microsphr, 2 mL, intravenous, Once in imaging  vancomycin, 125 mg, oral, BID  zinc oxide, 1 Application, Topical, BID      Continuous medications  norepinephrine, 0.01-3 mcg/kg/min, Last Rate: 0.06 mcg/kg/min (02/29/24 1200)      PRN medications  PRN medications: acetaminophen, albumin human, alteplase, ammonium lactate, benzocaine-menthoL, dextrose 10 % in water (D10W), dextrose, glucagon, heparin flush, heparin flush, ondansetron, oxygen, sennosides-docusate sodium    Results for orders placed or performed during the hospital encounter of 01/19/24 (from the past 24 hour(s))   POCT GLUCOSE   Result Value Ref Range    POCT Glucose 175 (H) 74 - 99 mg/dL   POCT GLUCOSE   Result Value Ref Range    POCT Glucose 151 (H) 74 - 99 mg/dL   CBC and Auto Differential   Result Value Ref Range    WBC 8.8 4.4 - 11.3 x10*3/uL    nRBC 0.0 0.0 - 0.0 /100 WBCs    RBC 3.00 (L) 4.00 - 5.20 x10*6/uL    Hemoglobin 9.4 (L) 12.0 - 16.0 g/dL    Hematocrit 30.1 (L) 36.0 - 46.0 %     80 - 100 fL    MCH 31.3 26.0 - 34.0 pg    MCHC 31.2 (L) 32.0 - 36.0 g/dL    RDW 19.0 (H) 11.5 - 14.5 %    Platelets 224 150 - 450 x10*3/uL    Neutrophils % 58.7 40.0 - 80.0 %    Immature Granulocytes %, Automated 0.3 0.0 - 0.9 %    Lymphocytes % 21.4 13.0 - 44.0 %    Monocytes % 10.0 2.0 - 10.0 %    Eosinophils % 8.0 0.0 - 6.0 %     Basophils % 1.6 0.0 - 2.0 %    Neutrophils Absolute 5.19 1.60 - 5.50 x10*3/uL    Immature Granulocytes Absolute, Automated 0.03 0.00 - 0.50 x10*3/uL    Lymphocytes Absolute 1.89 0.80 - 3.00 x10*3/uL    Monocytes Absolute 0.88 (H) 0.05 - 0.80 x10*3/uL    Eosinophils Absolute 0.71 (H) 0.00 - 0.40 x10*3/uL    Basophils Absolute 0.14 (H) 0.00 - 0.10 x10*3/uL   Basic metabolic panel   Result Value Ref Range    Glucose 200 (H) 65 - 99 mg/dL    Sodium 133 133 - 145 mmol/L    Potassium 3.5 3.4 - 5.1 mmol/L    Chloride 98 97 - 107 mmol/L    Bicarbonate 20 (L) 24 - 31 mmol/L    Urea Nitrogen 20 8 - 25 mg/dL    Creatinine 2.40 (H) 0.40 - 1.60 mg/dL    eGFR 21 (L) >60 mL/min/1.73m*2    Calcium 9.0 8.5 - 10.4 mg/dL    Anion Gap 15 <=19 mmol/L   Magnesium   Result Value Ref Range    Magnesium 1.80 1.60 - 3.10 mg/dL   POCT GLUCOSE   Result Value Ref Range    POCT Glucose 182 (H) 74 - 99 mg/dL   POCT GLUCOSE   Result Value Ref Range    POCT Glucose 162 (H) 74 - 99 mg/dL           This patient has a central line   Reason for the central line remaining today? HD      Assessment/Plan   Principal Problem:    Septic shock (CMS/[facility])  Active Problems:    Pneumonia    Low blood pressure    End stage renal disease (CMS/[facility])    Anemia due to blood loss, acute    73 YOF with h/o Afib, ESRD, C. Diff infection, recurrent pleural effusions, and MRSA bacteremia with MV vegetation who initially presented with anemia, fever and worsening sacral and lower extremities wounds. In the ED found to be hypotensive, anemic. Received IVF, antibiotics and admitted to the ICU with the diagnosis of septic shock. Course complicated by AMS, due to worsening shock, requiring vasopressors. Since then patient remains hypotensive on norepinephrine at variable doses. Had thoracentesis done that showed transudative effusion. Also vascular, general surgery and podiatry were consulted for pressure ulcers.  Blood pressure management has been complicated and patient has  been started on Midodrine with the dose increasing to 20 mg TID and Florinef (now discontinued).  Patient also received several courses of antibiotics. Given stable need for Levophed, attempt made to transfer to [facility] which was successful today after peer to peer.      Neuro:  #Acute Metabolic Encephalopathy   -improving; remains alert and generally oriented. answering questions appropriately.     Cardiovascular  #Shock  -Maintain MAPs>65  -Continue to titrate pressors  down  -Continue midodrine mg TID -- increased to 20 TID today  -no oral vasopressor agents or receptor inhibitors available on formulary at [facility].  If need arises will call downtown to see if we can get a few doses to trial     Pulmonary:  #PNA; Resolved      GI:  -Advance diet as tolerated     Renal:   #ESRD ON HD   #HypoNatremia   -Nephrology following and directingHD; tolerated tablo yesterday  -Tunneled HD catheter in place     Endocrine:  #Hypotension with suspicion of adrenal insufficiency  -florinef discontinued by nephro due to ineffectiveness  - BGM with SSI      Heme/Onc:  #Anemia   - Maintain Goal Hgb > 7      ID:  #Multiple Wounds   IV vanc Completed   IV Cefipime Completed   Chronic Doxy for suppression therapy  PO Vanc for cdiff   -Culture Data: Wound cx 2/10: PSAG, Proteus, MRSA      Skin/MSK:  #Multiple wounds  -Management as abocve  -Has been evaluated by podiatry, vascular surgery     DISPO  Pending bed availability at [facility]       I spent 90 minutes in the professional and overall care of this patient.      Luz Maria Vail MD

## 2024-02-29 NOTE — CARE PLAN
Problem: Discharge Barriers  Goal: My discharge needs are met  Outcome: Not Progressing     Problem: Pain  Goal: My pain/discomfort is manageable  Outcome: Progressing     Problem: Safety  Goal: Patient will be injury free during hospitalization  Outcome: Progressing  Goal: I will remain free of falls  Outcome: Progressing     Problem: Daily Care  Goal: Daily care needs are met  Outcome: Progressing     Problem: Psychosocial Needs  Goal: Demonstrates ability to cope with hospitalization/illness  Outcome: Progressing  Goal: Collaborate with me, my family, and caregiver to identify my specific goals  Outcome: Progressing     Problem: Fall/Injury  Goal: Not fall by end of shift  Outcome: Progressing  Goal: Be free from injury by end of the shift  Outcome: Progressing  Goal: Verbalize understanding of personal risk factors for fall in the hospital  Outcome: Progressing  Goal: Verbalize understanding of risk factor reduction measures to prevent injury from fall in the home  Outcome: Progressing  Goal: Pace activities to prevent fatigue by end of the shift  Outcome: Progressing     Problem: Skin  Goal: Decreased wound size/increased tissue granulation at next dressing change  Outcome: Progressing  Flowsheets (Taken 2/28/2024 1947)  Decreased wound size/increased tissue granulation at next dressing change:   Promote sleep for wound healing   Utilize specialty bed per algorithm   Protective dressings over bony prominences  Goal: Participates in plan/prevention/treatment measures  Outcome: Progressing  Flowsheets (Taken 2/28/2024 1947)  Participates in plan/prevention/treatment measures:   Discuss with provider PT/OT consult   Elevate heels   Increase activity/out of bed for meals  Goal: Prevent/manage excess moisture  Outcome: Progressing  Flowsheets (Taken 2/28/2024 1947)  Prevent/manage excess moisture:   Moisturize dry skin   Monitor for/manage infection if present   Follow provider orders for dressing changes    Cleanse incontinence/protect with barrier cream  Goal: Prevent/minimize sheer/friction injuries  Outcome: Progressing  Flowsheets (Taken 2/28/2024 1947)  Prevent/minimize sheer/friction injuries:   Complete micro-shifts as needed if patient unable. Adjust patient position to relieve pressure points, not a full turn   Increase activity/out of bed for meals   Use pull sheet   Utilize specialty bed per algorithm   Turn/reposition every 2 hours/use positioning/transfer devices   HOB 30 degrees or less  Goal: Promote/optimize nutrition  Outcome: Progressing  Flowsheets (Taken 2/28/2024 1947)  Promote/optimize nutrition:   Assist with feeding   Discuss with provider if NPO > 2 days   Offer water/supplements/favorite foods   Reassess MST if dietician not consulted   Monitor/record intake including meals   Consume > 50% meals/supplements  Goal: Promote skin healing  Outcome: Progressing  Flowsheets (Taken 2/28/2024 1947)  Promote skin healing:   Assess skin/pad under line(s)/device(s)   Protective dressings over bony prominences   Turn/reposition every 2 hours/use positioning/transfer devices   Rotate device position/do not position patient on device   Ensure correct size (line/device) and apply per  instructions     Problem: Pain  Goal: Takes deep breaths with improved pain control throughout the shift  Outcome: Progressing  Goal: Turns in bed with improved pain control throughout the shift  Outcome: Progressing  Goal: Performs ADL's with improved pain control throughout shift  Outcome: Progressing   The patient's goals for the shift include Visit with family.    The clinical goals for the shift include tolerating weaning vasopressors    Over the shift, the patient did not make progress toward the following goals. Barriers to progression include insurance acceptance. Recommendations to address these barriers include working with case management to find LTACH are covered by insurance.    Problem: Discharge  Barriers  Goal: My discharge needs are met  Outcome: Not Progressing

## 2024-02-29 NOTE — PROGRESS NOTES
Ayanna Gross is a 73 y.o. female on day 41 of admission presenting with Septic shock (CMS/HCC).    Subjective   Interval History:   Awake, alert  Family at bedside  Discussed with nursing   Afebrile, no chills  Remains On pressors  Denies shortness of breath or chest pain            Objective   Range of Vitals (last 24 hours)  Heart Rate:  []   Temp:  [36.3 °C (97.3 °F)-37 °C (98.6 °F)]   Resp:  [14-26]   BP: ()/(31-99)   Weight:  [105 kg (231 lb 11.3 oz)-106 kg (233 lb 7.5 oz)]   SpO2:  [34 %-98 %]   Daily Weight  02/29/24 : 106 kg (233 lb 7.5 oz)    Body mass index is 42.7 kg/m².    Physical Exam  Constitutional:       Appearance: Normal appearance.   HENT:      Head: Normocephalic and atraumatic.      Nose: Nose normal.      Mouth/Throat:      Mouth: Mucous membranes are moist.      Pharynx: Oropharynx is clear.   Eyes:      Conjunctiva/sclera: Conjunctivae normal.   Cardiovascular:      Rate and Rhythm: Normal rate and regular rhythm.   Pulmonary:      Comments: Decreased breath sounds bilateral   Abdominal:      General: Bowel sounds are normal.      Palpations: Abdomen is soft.   Musculoskeletal:         General: Normal range of motion.      Cervical back: Normal range of motion and neck supple.   Skin:     Comments: Bilateral posterior heel ulcers with moderate amount of granulation tissue with some eschar     Neurological:      Mental Status: She is alert.      Comments: Awake, alert   Psychiatric:         Mood and Affect: Mood normal.         Behavior: Behavior normal.           Antibiotics  aspirin tablet 325 mg  acetaminophen (Tylenol) tablet 650 mg  cefepime (Maxipime) 1 g in dextrose 5 % 50 mL IV  sodium chloride 0.9 % bolus 2,229 mL  apixaban (Eliquis) tablet 2.5 mg  escitalopram (Lexapro) tablet 10 mg  febuxostat (Uloric) tablet 40 mg  fenofibrate (Triglide) tablet 160 mg  gabapentin (Neurontin) capsule 100 mg  midodrine (Proamatine) tablet 15 mg  nystatin (Mycostatin) 100,000 unit/gram  powder  oxyCODONE-acetaminophen (Percocet) 5-325 mg per tablet 1 tablet  simvastatin (Zocor) tablet 20 mg  piperacillin-tazobactam-dextrose (Zosyn) IV 2.25 g  vancomycin (Vancocin) capsule 125 mg  oxygen (O2) therapy  dextrose 50 % injection 25 g  glucagon (Glucagen) injection 1 mg  dextrose 10 % in water (D10W) infusion  insulin lispro (HumaLOG) injection 0-10 Units  nystatin (Mycostatin) 100,000 unit/gram powder 1 Application  vancomycin-diluent combo no.1 (Xellia) IVPB 1,750 mg  piperacillin-tazobactam-dextrose (Zosyn) IV 2.25 g  sodium chloride 0.9 % bolus 500 mL  norepinephrine (Levophed) 8 mg in dextrose 5% 250 mL (0.032 mg/mL) infusion (premix)  vancomycin (Vancocin) placeholder  pantoprazole (ProtoNix) EC tablet 40 mg  pantoprazole (ProtoNix) injection 40 mg  sennosides-docusate sodium (Judy-Colace) 8.6-50 mg per tablet 1 tablet  doxycycline (Vibramycin) in dextrose 5 % in water (D5W) 100 mL  mg  LORazepam (Ativan) injection 2 mg  magnesium sulfate IV 2 g  zinc oxide 20 % ointment 1 Application  nystatin (Mycostatin) cream  norepinephrine (Levophed) 8 mg in dextrose 5% 250 mL (0.032 mg/mL) infusion (premix)  heparin 1,000 unit/mL injection 2,000 Units  heparin 1,000 unit/mL injection 2,000 Units  nystatin (Mycostatin) ointment  acetaminophen (Tylenol) oral liquid 1,000 mg  albumin human 25 % solution 12.5 g  perflutren lipid microspheres (Definity) injection 0.5-10 mL of dilution  sulfur hexafluoride microsphr (Lumason) injection 24.28 mg  perflutren protein A microsphere (Optison) injection 0.5 mL  ipratropium-albuteroL (Duo-Neb) 0.5-2.5 mg/3 mL nebulizer solution 3 mL  sodium chloride 3 % nebulizer solution 3 mL  vancomycin-diluent combo no.1 (Xellia) IVPB 750 mg  sennosides-docusate sodium (Judy-Colace) 8.6-50 mg per tablet 1 tablet  acetaminophen (Tylenol) tablet 650 mg  doxycycline (Vibramycin) capsule 100 mg  magnesium oxide (Mag-Ox) tablet 400 mg  vasopressin (Vasostrict) 0.2 unit/mL  infusion  norepinephrine (Levophed) 8 mg in dextrose 5% 250 mL (0.032 mg/mL) infusion (premix)  heparin 1,000 unit/mL injection 2,000 Units  heparin 1,000 unit/mL injection 2,000 Units  albumin human 25 % solution 12.5 g  vancomycin (Xellia) 1 g in 200 mL (Xellia) IVPB 1 g  ipratropium-albuteroL (Duo-Neb) 0.5-2.5 mg/3 mL nebulizer solution 3 mL  sodium chloride 3 % nebulizer solution 3 mL  albumin human 5 % infusion 12.5 g  heparin 1,000 unit/mL injection 2,000 Units  heparin 1,000 unit/mL injection 2,000 Units  vancomycin (Vancocin) capsule 125 mg  albumin human 25 % solution 12.5 g  heparin 1,000 unit/mL injection 2,000 Units  heparin 1,000 unit/mL injection 2,000 Units  ipratropium-albuteroL (Duo-Neb) 0.5-2.5 mg/3 mL nebulizer solution 3 mL  lidocaine (Xylocaine) 10 mg/mL (1 %) injection 50 mg  sodium phosphate 15 mmol in sodium chloride 0.9% 250 mL IV  sod phos di, mono-K phos mono (K Phos Neutral) tablet 250 mg  potassium, sodium phosphates (Phos-NaK) 280-160-250 mg packet 1 packet  doxycycline (Vibramycin) capsule 100 mg  fludrocortisone (Florinef) tablet 0.1 mg  gabapentin (Neurontin) capsule 100 mg  acetaminophen (Tylenol) tablet 1,000 mg  benzocaine-menthoL (Dermoplast) topical spray  vancomycin (Vancocin) capsule 125 mg  heparin 1,000 unit/mL injection 2,000 Units  heparin 1,000 unit/mL injection 2,000 Units  magnesium sulfate IV 2 g  albumin human 25 % solution 12.5 g  promethazine (Phenergan) injection 12.5 mg  ondansetron (Zofran) injection 4 mg  lidocaine (Xylocaine) 10 mg/mL (1 %) injection 50 mg  alteplase (Cathflo Activase) injection 2 mg  acetaminophen (Tylenol) tablet 650 mg  psyllium (Metamucil) 3.4 gram packet 1 packet  perflutren lipid microspheres (Definity) injection 0.5-10 mL of dilution  sulfur hexafluoride microsphr (Lumason) injection 24.28 mg  perflutren protein A microsphere (Optison) injection 0.5 mL  epoetin di-epbx (Retacrit) injection 10,000 Units  sulfur hexafluoride microsphr  (Lumason) injection 24.28 mg  perflutren lipid microspheres (Definity) injection 0.5-10 mL of dilution  perflutren lipid microspheres (Definity) injection 0.5-10 mL of dilution  sulfur hexafluoride microsphr (Lumason) injection 24.28 mg  perflutren protein A microsphere (Optison) injection 0.5 mL  ammonium lactate (Lac-Hydrin) 12 % lotion 1 Application  norepinephrine (Levophed) 8 mg in dextrose 5% 250 mL (0.032 mg/mL) infusion (premix)  fludrocortisone (Florinef) tablet 0.1 mg  albumin human 25 % solution 25 g  albumin human 25 % solution 25 g  meropenem (Merrem) 500 mg in sodium chloride 0.9 % 100 mL IV  potassium chloride 20 mEq in 100 mL IV premix  potassium chloride 20 mEq in 100 mL IV premix  magnesium sulfate IV 2 g  cefepime (Maxipime) 1 g in dextrose 5 % 50 mL IV  vancomycin-diluent combo no.1 (Xellia) IVPB 750 mg  albumin human 25 % solution 12.5 g  vancomycin (Xellia) 1 g in 200 mL (Xellia) IVPB 1 g  vancomycin (Vancocin) placeholder  vancomycin (Xellia) 1 g in 200 mL (Xellia) IVPB 1 g  midodrine (Proamatine) tablet 20 mg  vancomycin-diluent combo no.1 (Xellia) IVPB 750 mg  magnesium sulfate IV 2 g  heparin flush 100 unit/mL syringe 160 Units  heparin flush 100 unit/mL syringe 160 Units  vancomycin (Vancocin) 500 mg in dextrose 5 % 100 mL IV  albumin human 25 % solution 12.5 g  magnesium sulfate IV 2 g  cefepime (Maxipime) 1 g in dextrose 5 % 50 mL IV  vancomycin (Vancocin) placeholder  cefepime (Maxipime) 1 g in dextrose 5 % 50 mL IV  vancomycin (Vancocin) 500 mg in dextrose 5 % 100 mL IV      Relevant Results  Labs  Results from last 72 hours   Lab Units 02/29/24  0452 02/28/24  0634 02/27/24  0535   WBC AUTO x10*3/uL 8.8 10.0 10.1   HEMOGLOBIN g/dL 9.4* 9.4* 8.9*   HEMATOCRIT % 30.1* 29.8* 28.1*   PLATELETS AUTO x10*3/uL 224 247 199   NEUTROS PCT AUTO % 58.7 62.0 69.7   LYMPHS PCT AUTO % 21.4 20.3 15.0   MONOS PCT AUTO % 10.0 10.3 7.8   EOS PCT AUTO % 8.0 5.8 5.7       Results from last 72 hours   Lab  Units 02/29/24  0452 02/28/24  0634 02/27/24  0535   SODIUM mmol/L 133 131* 129*   POTASSIUM mmol/L 3.5 3.6 3.6   CHLORIDE mmol/L 98 96* 95*   CO2 mmol/L 20* 19* 18*   BUN mg/dL 20 28* 23   CREATININE mg/dL 2.40* 3.10* 2.50*   GLUCOSE mg/dL 200* 251* 195*   CALCIUM mg/dL 9.0 9.1 8.9   ANION GAP mmol/L 15 16 16   EGFR mL/min/1.73m*2 21* 16* 20*           Estimated Creatinine Clearance: 24.6 mL/min (A) (by C-G formula based on SCr of 2.4 mg/dL (H)).  C-Reactive Protein   Date Value Ref Range Status   12/25/2023 5.20 (H) 0.00 - 2.00 mg/dL Final     CRP   Date Value Ref Range Status   06/23/2023 19.9 (H) 0 - 2.0 MG/DL Final     Comment:     Performed at [place] [address]   05/22/2022 1.0 0 - 2.0 MG/DL Final     Comment:     Performed at [place] [address]     Microbiology   reviewed  Imaging  Transthoracic Echo (TTE) Limited    Result Date: 2/7/2024           [place] [address]            Phone [phone] TRANSTHORACIC ECHOCARDIOGRAM REPORT  Patient Name:      BASIA Gray Physician:    [id number] Littlejohn DO Study Date:        2/7/2024            Ordering Provider:    [id number] LITTLEJOHN MRN/PID:           [id number]            Fellow: Accession#:        [id number]        Nurse: Date of Birth/Age: 1950 / 73 years Sonographer:          Rena QUARLES,                                                              FABIAN CONLEY Gender:            F                   Additional Staff: Height:            157.48 cm           Admit Date: Weight:                                Admission Status:     Inpatient - Routine BSA:               m2                  Department Location:  [facility] Blood Pressure: 95 /42  mmHg Study Type:    TRANSTHORACIC ECHO (TTE) LIMITED Diagnosis/ICD: Hypotension, unspecified-I95.9; Mitral valve disorder-I05.9 Indication:    Limited to assess for MS, Hypotension CPT Codes:     Echo Limited-00391; Color Doppler-52063; Doppler Limited-94777 Patient History: Pertinent History: Hypotension, hx of MV endocarditis, PAD, DM2 ESRD on Hd. Study Detail: The following Echo studies were performed: 2D, M-Mode, Doppler and               color flow. Technically challenging study due to poor acoustic               windows and L Breast implant. Unable to obtain suprasternal notch               view.  PHYSICIAN INTERPRETATION: Left Ventricle: Left ventricular systolic function is normal. There are no regional wall motion abnormalities. The left ventricular cavity size is normal. Left ventricular diastolic filling was not assessed. Left Atrium: The left atrium is moderate to severely dilated. Right Ventricle: The right ventricle is normal in size. There is normal right ventricular global systolic function. Right Atrium: The right atrium was not well visualized. Aortic Valve: The aortic valve was not assessed. Aortic valve regurgitation was not assessed. Mitral Valve: The mitral valve is abnormal. There is evidence of mild mitral valve stenosis. The doppler estimated mean and peak diastolic pressure gradients are 4.0 mmHg and 6.6 mmHg respectively. There is severe mitral annular calcification. There is no evidence of mitral valve regurgitation. Tricuspid Valve: The tricuspid valve was not assessed. Tricuspid regurgitation was not assessed. Pulmonic Valve: The pulmonic valve is not well visualized. The pulmonic valve regurgitation was not assessed. Pericardium: There is no pericardial effusion noted. Aorta: The aortic root was not assessed.  CONCLUSIONS:  1. Left ventricular systolic function is normal.  2. The left atrium is moderate to severely dilated.  3. There is severe mitral annular calcification. QUANTITATIVE  DATA SUMMARY: LV DIASTOLIC FUNCTION:                        Normal Ranges: MV Peak E:    1.23 m/s (0.7-1.2 m/s) MV Peak A:    1.12 m/s (0.42-0.7 m/s) E/A Ratio:    1.10     (1.0-2.2) MV lateral e' 0.04 m/s MV medial e'  0.07 m/s MITRAL VALVE:                      Normal Ranges: MV Vmax:    1.28 m/s (<=1.3m/s) MV peak P.6 mmHg (<5mmHg) MV mean P.0 mmHg (<48mmHg)  RIGHT VENTRICLE: RV Basal 2.97 cm  [id number] Lemus DO Electronically signed on 2024 at 3:50:21 PM  ** Final **     Transthoracic Echo (TTE) Limited    Result Date: 2024           [place] [address]            Phone [phone] TRANSTHORACIC ECHOCARDIOGRAM REPORT  Patient Name:      BASIA Gray Physician:    [id number] Lemus DO Study Date:        2024            Ordering Provider:    [id number] REBECA RESENDEZ MRN/PID:           [id number]            Fellow: Accession#:        [id number]        Nurse: Date of Birth/Age: 1950 / 73 years Sonographer:          Jennifer WALL Gender:            F                   Additional Staff: Height:            157.48 cm           Admit Date:           2024 Weight:            98.88 kg            Admission Status:     Inpatient - Routine BSA:               1.98 m2             Department Location: Blood Pressure: 59 /49 mmHg Study Type:    TRANSTHORACIC ECHO (TTE) LIMITED Diagnosis/ICD: Acute on chronic diastolic (congestive) heart failure                (CHF)-I50.33 Indication:    Acute on chronic diastolic congestive heart failure CPT Codes:     Echo Limited-87074; Color Doppler-98420; Doppler Limited-01941 Patient History: Pertinent History: PAF Peripheralvascular disease low blood pressure HTN Breast                    Cancer Mixed Hyperlipidemia HF CVA Hypotension DMII CAD CKD. Study Detail: The  following Echo studies were performed: 2D, M-Mode, Doppler and               color flow. Technically challenging study due to body habitus,               patient lying in supine position, poor acoustic windows, prominent               lung artifact and Breast Prosthesis. Definity used as a contrast               agent for endocardial border definition. Unable to obtain               suprasternal notch view.  PHYSICIAN INTERPRETATION: Left Ventricle: Left ventricular systolic function is normal, with an estimated ejection fraction of 60-65%. There are no regional wall motion abnormalities. The left ventricular cavity size is normal. Left ventricular diastolic filling was indeterminate. Left Atrium: The left atrium is moderately dilated. Right Ventricle: The right ventricle is normal in size. There is normal right ventricular global systolic function. Right Atrium: The right atrium is normal in size. Aortic Valve: The aortic valve is trileaflet. There is no evidence of aortic valve regurgitation. The peak instantaneous gradient of the aortic valve is 5.3 mmHg. Mitral Valve: The mitral valve is normal in structure. There is severe mitral annular calcification. There is trace mitral valve regurgitation. Tricuspid Valve: The tricuspid valve is structurally normal. There is mild tricuspid regurgitation. Pulmonic Valve: The pulmonic valve is not well visualized. The pulmonic valve regurgitation was not well visualized. Pericardium: There is no pericardial effusion noted. Aorta: The aortic root is normal.  CONCLUSIONS:  1. Left ventricular systolic function is normal with a 60-65% estimated ejection fraction.  2. The left atrium is moderately dilated.  3. There is severe mitral annular calcification. QUANTITATIVE DATA SUMMARY: 2D MEASUREMENTS:                          Normal Ranges: LAs:           3.80 cm   (2.7-4.0cm) IVSd:          0.92 cm   (0.6-1.1cm) LVPWd:         1.09 cm   (0.6-1.1cm) LVIDd:         2.98 cm    (3.9-5.9cm) LVIDs:         2.12 cm LV Mass Index: 41.2 g/m2 LV % FS        28.9 % LA VOLUME:                               Normal Ranges: LA Vol A4C:        58.3 ml    (22+/-6mL/m2) LA Vol A2C:        60.9 ml LA Vol BP:         62.1 ml LA Vol Index A4C:  29.4ml/m2 LA Vol Index A2C:  30.7 ml/m2 LA Vol Index BP:   31.3 ml/m2 LA Area A4C:       20.5 cm2 LA Area A2C:       20.1 cm2 LA Major Axis A4C: 6.1 cm LA Major Axis A2C: 5.6 cm LA Volume Index:   29.3 ml/m2 LA Vol A4C:        53.1 ml LA Vol A2C:        58.4 ml LV SYSTOLIC FUNCTION BY 2D PLANIMETRY (MOD):                     Normal Ranges: EF-A4C View: 68.2 % (>=55%) EF-A2C View: 65.9 % EF-Biplane:  67.8 % AORTIC VALVE:                         Normal Ranges: AoV Vmax:      1.15 m/s (<=1.7m/s) AoV Peak P.3 mmHg (<20mmHg) LVOT Max Shimon:  1.11 m/s (<=1.1m/s) LVOT VTI:      16.20 cm LVOT Diameter: 1.90 cm  (1.8-2.4cm) AoV Area,Vmax: 2.74 cm2 (2.5-4.5cm2) PULMONIC VALVE:                         Normal Ranges: PV Accel Time: 63 msec  (>120ms) PV Max Shimon:    1.0 m/s  (0.6-0.9m/s) PV Max P.3 mmHg  [id number] Herminio Lemus DO Electronically signed on 2024 at 7:58:25 AM  ** Final **     Assessment/Plan   Septic shock-patient on pressors  Left-sided pleural effusion   Left lower lobe pneumonia-treated  Proteus urinary tract infection-treated  History of MRSA endocarditis  History of C. 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

## 2024-02-29 NOTE — PROGRESS NOTES
Occupational Therapy    OT Treatment    Patient Name: Ayanna Gross  MRN: [id number]  Today's Date: 2/29/2024  Time Calculation  Start Time: 1029  Stop Time: 1108  Time Calculation (min): 39 min         Assessment:  OT Assessment: tolerated session fairly with increased functional tolerance this date, demonstrating progression towards POC  End of Session Communication: Bedside nurse  End of Session Patient Position: Bed, 3 rail up, Alarm on  OT Assessment Results: Decreased ADL status, Decreased upper extremity range of motion, Decreased upper extremity strength, Decreased endurance  Plan:  Treatment Interventions: ADL retraining, UE strengthening/ROM, Endurance training, Patient/family training, Continued evaluation  OT Frequency: 2 times per week  OT Discharge Recommendations: Moderate intensity level of continued care  OT - OK to Discharge: Yes  Treatment Interventions: ADL retraining, UE strengthening/ROM, Endurance training, Patient/family training, Continued evaluation    Subjective     General:  General  Family/Caregiver Present: Yes  Caregiver Feedback: spouse  Co-Treatment: OT  Co-Treatment Reason: medically complex pt requiring two person assist for safe handling  Prior to Session Communication: Bedside nurse  Patient Position Received: Bed, 3 rail up, Alarm on  Preferred Learning Style: verbal  General Comment: cleared for therapy per RN. Pt supine in bed upon arrival, fatigued but agreeable to tx  Precautions:  Hearing/Visual Limitations: [place]  Medical Precautions: Fall precautions, Infection precautions (cdiff)  Precautions Comment: rectal tube    Pain:  Pain Assessment  Pain Assessment: 0-10  Pain Score: 0 - No pain    Objective    Cognition:  Cognition  Overall Cognitive Status: Impaired  Orientation Level: Disoriented to situation  Following Commands: Follows one step commands with increased time  Problem Solving: Exceptions to WFL  Insight: Moderate  Processing Speed: Delayed    Activities of Daily  Living: Feeding  Feeding Comments: s/u for feeding task at end of session    Grooming  Grooming Level of Assistance: Setup  Grooming Where Assessed: Bed level  Grooming Comments: pt with cream cheese on face and hands with warm wash cloth provided for face/hand washing    UE Dressing  UE Dressing Level of Assistance: Moderate assistance  UE Dressing Where Assessed: Bed level  UE Dressing Comments: don/doff gown    Toileting  Toileting Level of Assistance: Dependent (x2)  Where Assessed: Bed level  Toileting Comments: rectal tube leaking requiring dependent pericare with increased time required for hygiene    Bed Mobility/Transfers: Bed Mobility  Bed Mobility: Yes  Bed Mobility 1  Bed Mobility 1: Supine to sitting  Level of Assistance 1: Maximum assistance (x2)  Bed Mobility Comments 1: assist with BLE advancement off EOB and trunk elevation.  Bed Mobility 2  Bed Mobility  2: Sitting to supine  Level of Assistance 2: Maximum assistance (x2)  Bed Mobility Comments 2: assist to lift BLE on bed and trunk control. Dependent to boost to HOB with use of drawsheet  Bed Mobility 3  Bed Mobility 3: Rolling right, Rolling left  Level of Assistance 3: Maximum assistance (x2)  Bed Mobility Comments 3: rolling L/R for rear pericare hygiene and linen change with cues for use of bed rails for UE support    Transfers  Transfer: No     Sitting Balance:  Static Sitting Balance  Static Sitting-Level of Assistance: Close supervision, Moderate assistance  Static Sitting-Comment/Number of Minutes: fair- balance with mod A initially with BUE supported, progressing to S with increased cues    Therapy/Activity:    Therapeutic Activity  Therapeutic Activity 1: tolerated sitting EOB ~20 min with varying assist with increased cues required for postural alignment d/t intermittent L/R lateral lean and retropulsion. Pt able to adjust posture with weightshifting and core activation cues    Outcome Measures:[facility] Daily Activity  Putting on and  taking off regular lower body clothing: Total  Bathing (including washing, rinsing, drying): Total  Putting on and taking off regular upper body clothing: A lot  Toileting, which includes using toilet, bedpan or urinal: Total  Taking care of personal grooming such as brushing teeth: A little  Eating Meals: A little  Daily Activity - Total Score: 11    Education Documentation  Handouts, taught by ANDERSON Miller at 2/29/2024 12:40 PM.  Learner: Patient  Readiness: Acceptance  Method: Explanation  Response: Verbalizes Understanding, Needs Reinforcement    Body Mechanics, taught by ANDERSON Miller at 2/29/2024 12:40 PM.  Learner: Patient  Readiness: Acceptance  Method: Explanation  Response: Verbalizes Understanding, Needs Reinforcement    Home Exercise Program, taught by ANDERSON Miller at 2/29/2024 12:40 PM.  Learner: Patient  Readiness: Acceptance  Method: Explanation  Response: Verbalizes Understanding, Needs Reinforcement    ADL Training, taught by ANDERSON Miller at 2/29/2024 12:40 PM.  Learner: Patient  Readiness: Acceptance  Method: Explanation  Response: Verbalizes Understanding, Needs Reinforcement    Education Comments  No comments found.      Problem: OT Goals  Goal: Pt will complete grooming Min A with use of AE/compensatory techniques   Outcome: Progressing  Goal: EOB Assessment pending Vital Signs.  Outcome: Progressing

## 2024-02-29 NOTE — PROGRESS NOTES
Physical Therapy    Physical Therapy Treatment    Patient Name: Ayanna Gross  MRN: [id number]  Today's Date: 2/29/2024  Time Calculation  Start Time: 1030  Stop Time: 1109  Time Calculation (min): 39 min     Assessment/Plan   PT Assessment  PT Assessment Results: Decreased strength, Decreased range of motion, Decreased endurance, Impaired balance, Decreased mobility, Decreased coordination, Decreased cognition, Impaired judgement, Decreased safety awareness  Rehab Prognosis: Fair  Evaluation/Treatment Tolerance: Patient tolerated treatment well, Patient limited by fatigue  Medical Staff Made Aware: Yes  Strengths: Support of extended family/friends  Barriers to Participation: Comorbidities, Ability to acquire knowledge  End of Session Communication: Bedside nurse  Assessment Comment: Slow progress towards therapy goals; improved tolerance for static setaed balance on EOB; remains significantly deconditioned.  End of Session Patient Position: Bed, 3 rail up, Alarm on  PT Plan  Inpatient/Swing Bed or Outpatient: Inpatient  PT Plan  Treatment/Interventions: Bed mobility, Transfer training, Balance training, Neuromuscular re-education, Strengthening, Endurance training, Range of motion, Therapeutic exercise, Therapeutic activity  PT Plan: Skilled PT  PT Frequency: 3 times per week  PT Discharge Recommendations: Moderate intensity level of continued care  PT Recommended Transfer Status: Assist x2  PT - OK to Discharge: Yes    General Visit Information:   PT  Visit  PT Received On: 02/29/24  Response to Previous Treatment: Patient with no complaints from previous session.  General  Reason for Referral: Impaired mobility with septic shock  Past Medical History Relevant to Rehab: AFIB, ESRD, dialysis, C-diff, pleural effusions, endocarditis, thoracentesis, asthma, CAD, CHF, HTN, sleep apnea, DM, carpal tunnel surgery, partial mastectomy right, mastectomy left, foot surgery, umbilical hernia repair  Missed Visit: No  Missed  Visit Reason: Other (Comment)  Family/Caregiver Present: Yes  Caregiver Feedback: spouse  Co-Treatment: OT  Co-Treatment Reason: medically complex pt requiring two person assist for safe handling  Prior to Session Communication: Bedside nurse  Patient Position Received: Bed, 3 rail up, Alarm off, not on at start of session  Preferred Learning Style: verbal  General Comment: Pt cleared for therapy via RN, received in supine, NAD, c/o fatigue but agreeable to participate in therapy. (+) telemetry, IV, rectal tube, B PRAFOs    Subjective   Precautions:  Precautions  Hearing/Visual Limitations: hard of hearing  Medical Precautions: Fall precautions, Infection precautions (contact precautions)    Objective   Pain:  Pain Assessment  Pain Assessment: 0-10  Pain Score: 0 - No pain  Cognition:  Cognition  Overall Cognitive Status: Impaired  Orientation Level: Disoriented to situation  Following Commands: Follows one step commands with increased time  Memory: Exceptions to WFL  Problem Solving: Exceptions to WFL  Insight: Moderate  Processing Speed: Delayed  Postural Control:  Postural Control  Postural Control: Impaired  Posture Comment: forward head, rounded shoulders; intermittent trunk retropulsion in unsupported sitting  Static Sitting Balance  Static Sitting-Balance Support: Bilateral upper extremity supported  Static Sitting-Level of Assistance: Moderate assistance (x2)  Static Sitting-Comment/Number of Minutes: 20  Extremity/Trunk Assessments:  RLE   RLE : Exceptions to WFL (grossly > 2-/5; AAROM limited throughout)  LLE   LLE : Exceptions to WFL (grossly > 2-/5; AAROM limited throughout)  Activity Tolerance:  Activity Tolerance  Endurance: Decreased tolerance for upright activites  Treatments:  Therapeutic Exercise  Therapeutic Exercise Performed: No     Therapeutic Activity  Therapeutic Activity Performed: Yes  Therapeutic Activity 1: pt incontinent of large amount of stool; max assist x 2 to roll to L/R and  maintain L/R sidelying for dependent jeaneth-area cleanse    Balance/Neuromuscular Re-Education  Balance/Neuromuscular Re-Education Activity Performed: Yes  Balance/Neuromuscular Re-Education Activity 1: pt sat on EOB for ~20 minutes with varying levels of assist d/t intermittent trunk retropulsion vs R/L lateral leaning; improved core activation and weight-shifting to correct balance encouraged throughout with limited carryover demonstrated    Bed Mobility  Bed Mobility: Yes  Bed Mobility 1  Bed Mobility 1: Supine to sitting  Level of Assistance 1: Maximum assistance (x2)  Bed Mobility Comments 1: assist for trunk elevation and advancement of BLE off EOB  Bed Mobility 2  Bed Mobility  2: Sitting to supine  Level of Assistance 2: Maximum assistance (x2)  Bed Mobility Comments 2: assist for controlled trunk descent and lifting BLE onto bed; dep x 2 boost towards HOB  Bed Mobility 3  Bed Mobility 3: Rolling right, Rolling left  Level of Assistance 3: Maximum assistance (x2)    Ambulation/Gait Training  Ambulation/Gait Training Performed: No  Transfers  Transfer: No    Stairs  Stairs: No    Outcome Measures:  [facility] Basic Mobility  Turning from your back to your side while in a flat bed without using bedrails: Total  Moving from lying on your back to sitting on the side of a flat bed without using bedrails: Total  Moving to and from bed to chair (including a wheelchair): Total  Standing up from a chair using your arms (e.g. wheelchair or bedside chair): Total  To walk in hospital room: Total  Climbing 3-5 steps with railing: Total  Basic Mobility - Total Score: 6    Education Documentation  Mobility Training, taught by Lesly Banuelos, PT at 2/29/2024 11:45 AM.  Learner: Patient  Readiness: Acceptance  Method: Demonstration, Explanation  Response: Needs Reinforcement    Education Comments  No comments found.      OP EDUCATION:  Outpatient Education  Individual(s) Educated: Patient  Education Provided: Fall Risk, POC  Risk  and Benefits Discussed with Patient/Caregiver/Other: yes  Patient/Caregiver Demonstrated Understanding: no  Plan of Care Discussed and Agreed Upon: yes  Education Comment: reinforcement needed    Encounter Problems       Encounter Problems (Active)       Mobility       Bed mobility including supine to long sit and long sit to supine with mod assist x 2. (Progressing)       Start:  02/22/24    Expected End:  03/07/24            Bed mobility including supine to sit and sit to supine with max assist x 1-2 persons. (Progressing)       Start:  02/22/24    Expected End:  03/07/24            Patient will maintain midline while sitting on side of bed with mod assist x 1-2 persons. (Progressing)       Start:  02/22/24    Expected End:  03/07/24               PT Problem       ROM jessica LE knee flexion ~60 degrees. (Progressing)       Start:  02/22/24    Expected End:  03/07/24

## 2024-02-29 NOTE — CARE PLAN
Problem: Pain  Goal: My pain/discomfort is manageable  Outcome: Progressing     Problem: Safety  Goal: Patient will be injury free during hospitalization  Outcome: Progressing  Goal: I will remain free of falls  Outcome: Progressing     Problem: Daily Care  Goal: Daily care needs are met  Outcome: Progressing     Problem: Psychosocial Needs  Goal: Demonstrates ability to cope with hospitalization/illness  Outcome: Progressing  Goal: Collaborate with me, my family, and caregiver to identify my specific goals  Outcome: Progressing     Problem: Discharge Barriers  Goal: My discharge needs are met  Outcome: Progressing     Problem: Fall/Injury  Goal: Not fall by end of shift  Outcome: Progressing  Goal: Be free from injury by end of the shift  Outcome: Progressing  Goal: Verbalize understanding of personal risk factors for fall in the hospital  Outcome: Progressing  Goal: Verbalize understanding of risk factor reduction measures to prevent injury from fall in the home  Outcome: Progressing  Goal: Pace activities to prevent fatigue by end of the shift  Outcome: Progressing     Problem: Skin  Goal: Decreased wound size/increased tissue granulation at next dressing change  Outcome: Progressing  Goal: Participates in plan/prevention/treatment measures  Outcome: Progressing  Goal: Prevent/manage excess moisture  Outcome: Progressing  Goal: Prevent/minimize sheer/friction injuries  Outcome: Progressing  Goal: Promote/optimize nutrition  Outcome: Progressing  Goal: Promote skin healing  Outcome: Progressing     Problem: Pain  Goal: Takes deep breaths with improved pain control throughout the shift  Outcome: Progressing  Goal: Turns in bed with improved pain control throughout the shift  Outcome: Progressing  Goal: Performs ADL's with improved pain control throughout shift  Outcome: Progressing     Problem: Respiratory  Goal: Patent airway maintained this shift  Outcome: Progressing  Goal: Tolerate pulmonary toileting this  shift  Outcome: Progressing  Goal: Verbalize decreased shortness of breath this shift  Outcome: Progressing  Goal: Increase self care and/or family involvement in next 24 hours  Outcome: Progressing  Goal: No signs of respiratory distress (eg. Use of accessory muscles. Peds grunting)  Outcome: Progressing  Goal: Clear secretions with interventions this shift  Outcome: Progressing  Goal: Minimize anxiety/maximize coping throughout shift  Outcome: Progressing  Goal: Minimal/no exertional discomfort or dyspnea this shift  Outcome: Progressing  Goal: Tolerate mechanical ventilation evidenced by VS/agitation level this shift  Outcome: Progressing  Goal: Wean oxygen to maintain O2 saturation per order/standard this shift  Outcome: Progressing     Problem: Diabetes  Goal: Maintain electrolyte levels within acceptable range throughout shift  Outcome: Progressing  Goal: Maintain glucose levels >70mg/dl to <250mg/dl throughout shift  Outcome: Progressing  Goal: No changes in neurological exam by end of shift  Outcome: Progressing  Goal: Learn about and adhere to nutrition recommendations by end of shift  Outcome: Progressing  Goal: Vital signs within normal range for age by end of shift  Outcome: Progressing  Goal: Increase self care and/or family involovement by end of shift  Outcome: Progressing  Goal: Receive DSME education by end of shift  Outcome: Progressing     Problem: Discharge Planning  Goal: Discharge to home or other facility with appropriate resources  Outcome: Progressing     Problem: Chronic Conditions and Co-morbidities  Goal: Patient's chronic conditions and co-morbidity symptoms are monitored and maintained or improved  Outcome: Progressing   The patient's goals for the shift include Visit with family.    The clinical goals for the shift include pt will tolerate vasopressor weaning    Over the shift, the patient did not make progress toward the following goals. Barriers to progression include .  Recommendations to address these barriers include .

## 2024-03-01 NOTE — PROGRESS NOTES
Patient to be discharged on this day to [facility]. Precert approved. Nurse to call report to [phone]. Transport to be set up. Will follow as needed.      03/01/24 9636   Discharge Planning   Home or Post Acute Services Post acute facilities (Rehab/SNF/etc)   Type of Post Acute Facility Services Long term care   Patient expects to be discharged to: [facility]   Does the patient need discharge transport arranged? Yes   RoundTrip coordination needed? Yes

## 2024-03-01 NOTE — PROGRESS NOTES
Physical Therapy                 Therapy Communication Note    Patient Name: Ayanna Gross  MRN: [id number]  Today's Date: 3/1/2024     Discipline: Physical Therapy    Missed Visit Reason: Other (Comment)   (dialysis being set-up at bedside.)    Missed Time: Attempt    Comment:

## 2024-03-01 NOTE — PROGRESS NOTES
"Aaynna Gross is a 73 y.o. female on day 42 of admission presenting with Septic shock (CMS/HCC).    Subjective   Remains on low dose norepi drip 0.05mcg/kg/min. Neuro status stable, awake and appropriate. Sat up on the side of the bed with PT yesterday.  No events overnight. Plan for Tablo today and then discharge to [facility].      Objective     Physical Exam  Constitutional:       Appearance: She is not toxic-appearing.   HENT:      Head: Normocephalic.      Nose: Nose normal.      Mouth/Throat:      Mouth: Mucous membranes are moist.   Eyes:      Pupils: Pupils are equal, round, and reactive to light.   Cardiovascular:      Rate and Rhythm: Normal rate and regular rhythm.   Pulmonary:      Effort: Pulmonary effort is normal. No respiratory distress.   Abdominal:      General: Abdomen is flat. There is no distension.      Palpations: Abdomen is soft.      Tenderness: There is no abdominal tenderness.   Musculoskeletal:         General: No swelling or tenderness.      Right lower leg: Edema present.      Left lower leg: Edema present.   Skin:     General: Skin is warm.   Neurological:      General: No focal deficit present.      Mental Status: She is alert.   Psychiatric:         Mood and Affect: Mood normal.         Thought Content: Thought content normal.         Last Recorded Vitals  Blood pressure 90/74, pulse 95, temperature 36 °C (96.8 °F), temperature source Temporal, resp. rate 15, height 1.575 m (5' 2\"), weight 106 kg (233 lb 11 oz), SpO2 100 %.  Intake/Output last 3 Shifts:  I/O last 3 completed shifts:  In: 467.1 (5 mL/kg) [P.O.:240; I.V.:227.1 (2.4 mL/kg)]  Out: 280 (3 mL/kg) [Stool:280]  Dosing Weight: 93 kg     Relevant Results  Scheduled medications  apixaban, 2.5 mg, oral, BID  doxycylcine, 100 mg, oral, Daily  epoetin di or biosimilar, 10,000 Units, intravenous, Every Mon/Wed/Fri  escitalopram, 10 mg, oral, Nightly  gabapentin, 100 mg, oral, TID  heparin, 2,000 Units, intra-catheter, After " Dialysis  heparin, 2,000 Units, intra-catheter, After Dialysis  insulin lispro, 0-10 Units, subcutaneous, TID with meals  ipratropium-albuteroL, 3 mL, nebulization, q6h while awake  lidocaine, 5 mL, infiltration, Once  midodrine, 30 mg, oral, TID with meals  nystatin, , Topical, BID  nystatin, , Topical, BID  pantoprazole, 40 mg, oral, Daily   Or  pantoprazole, 40 mg, intravenous, Daily  perflutren lipid microspheres, 0.5-10 mL of dilution, intravenous, Once in imaging  perflutren protein A microsphere, 0.5 mL, intravenous, Once in imaging  psyllium, 1 packet, oral, BID  sulfur hexafluoride microsphr, 2 mL, intravenous, Once in imaging  sulfur hexafluoride microsphr, 2 mL, intravenous, Once in imaging  vancomycin, 125 mg, oral, BID  zinc oxide, 1 Application, Topical, BID      Continuous medications  norepinephrine, 0.01-3 mcg/kg/min, Last Rate: 0.05 mcg/kg/min (03/01/24 1032)      PRN medications  PRN medications: acetaminophen, albumin human, alteplase, ammonium lactate, benzocaine-menthoL, dextrose 10 % in water (D10W), dextrose, glucagon, heparin flush, heparin flush, ondansetron, oxygen, sennosides-docusate sodium    Results for orders placed or performed during the hospital encounter of 01/19/24 (from the past 24 hour(s))   POCT GLUCOSE   Result Value Ref Range    POCT Glucose 181 (H) 74 - 99 mg/dL   POCT GLUCOSE   Result Value Ref Range    POCT Glucose 205 (H) 74 - 99 mg/dL   CBC and Auto Differential   Result Value Ref Range    WBC 9.4 4.4 - 11.3 x10*3/uL    nRBC 0.0 0.0 - 0.0 /100 WBCs    RBC 2.90 (L) 4.00 - 5.20 x10*6/uL    Hemoglobin 8.9 (L) 12.0 - 16.0 g/dL    Hematocrit 28.6 (L) 36.0 - 46.0 %    MCV 99 80 - 100 fL    MCH 30.7 26.0 - 34.0 pg    MCHC 31.1 (L) 32.0 - 36.0 g/dL    RDW 18.6 (H) 11.5 - 14.5 %    Platelets 228 150 - 450 x10*3/uL    Neutrophils % 57.0 40.0 - 80.0 %    Immature Granulocytes %, Automated 0.3 0.0 - 0.9 %    Lymphocytes % 23.1 13.0 - 44.0 %    Monocytes % 11.3 2.0 - 10.0 %     Eosinophils % 6.9 0.0 - 6.0 %    Basophils % 1.4 0.0 - 2.0 %    Neutrophils Absolute 5.34 1.60 - 5.50 x10*3/uL    Immature Granulocytes Absolute, Automated 0.03 0.00 - 0.50 x10*3/uL    Lymphocytes Absolute 2.16 0.80 - 3.00 x10*3/uL    Monocytes Absolute 1.06 (H) 0.05 - 0.80 x10*3/uL    Eosinophils Absolute 0.65 (H) 0.00 - 0.40 x10*3/uL    Basophils Absolute 0.13 (H) 0.00 - 0.10 x10*3/uL   Basic metabolic panel   Result Value Ref Range    Glucose 242 (H) 65 - 99 mg/dL    Sodium 134 133 - 145 mmol/L    Potassium 3.4 3.4 - 5.1 mmol/L    Chloride 98 97 - 107 mmol/L    Bicarbonate 21 (L) 24 - 31 mmol/L    Urea Nitrogen 25 8 - 25 mg/dL    Creatinine 3.00 (H) 0.40 - 1.60 mg/dL    eGFR 16 (L) >60 mL/min/1.73m*2    Calcium 8.9 8.5 - 10.4 mg/dL    Anion Gap 15 <=19 mmol/L   Magnesium   Result Value Ref Range    Magnesium 1.70 1.60 - 3.10 mg/dL   POCT GLUCOSE   Result Value Ref Range    POCT Glucose 209 (H) 74 - 99 mg/dL   POCT GLUCOSE   Result Value Ref Range    POCT Glucose 180 (H) 74 - 99 mg/dL   POCT GLUCOSE   Result Value Ref Range    POCT Glucose 157 (H) 74 - 99 mg/dL           This patient has a central line   Reason for the central line remaining today? HD      Assessment/Plan   Principal Problem:    Septic shock (CMS/[facility])  Active Problems:    Pneumonia    Low blood pressure    End stage renal disease (CMS/[facility])    Anemia due to blood loss, acute    73 YOF with h/o Afib, ESRD, C. Diff infection, recurrent pleural effusions, and MRSA bacteremia with MV vegetation who initially presented with anemia, fever and worsening sacral and lower extremities wounds. In the ED found to be hypotensive, anemic. Received IVF, antibiotics and admitted to the ICU with the diagnosis of septic shock. Course complicated by AMS, due to worsening shock, requiring vasopressors. Since then patient remains hypotensive on norepinephrine at variable doses. Had thoracentesis done that showed transudative effusion. Also vascular, general surgery and  podiatry were consulted for pressure ulcers.  Blood pressure management has been complicated and patient has been started on Midodrine with the dose increasing to 20 mg TID and Florinef (now discontinued).  Patient also received several courses of antibiotics. Given stable need for Levophed, attempt made to transfer to [facility] which was successful yesterday after peer to peer. Bed became available today and pt will be discharged after Tablo.    Neuro:  #Acute Metabolic Encephalopathy   -improving; remains alert and generally oriented. answering questions appropriately.     Cardiovascular  #Shock  -BP difficult to accurately assess given edema and limited locations for BP cuff - BP has been titrated to mental status in recent weeks   -Continue midodrine mg TID -- increased to 20 TID yesterday. Monitor response       Pulmonary:  #PNA; Resolved      GI:  -regular diet     Renal:   #ESRD ON HD   -Nephrology following and directingHD  -tolerating Tablo with variable amounts of fluid removal  -Tunneled HD catheter in place     Endocrine:  #Hypotension with suspicion of adrenal insufficiency  -florinef discontinued by nephro due to ineffectiveness  - BGM with SSI      Heme/Onc:  #Anemia   - Maintain Goal Hgb > 7      ID:  #Multiple Wounds   IV vanc Completed   IV Cefipime Completed   Chronic Doxy for suppression therapy  PO Vanc for cdiff   -Culture Data: Wound cx 2/10: PSAG, Proteus, MRSA      Skin/MSK:  #Multiple wounds  -Management as abocve  -Has been evaluated by podiatry, vascular surgery     DISPO  To [facility] today after Tablo       I spent 60 minutes in the professional and overall care of this patient.      Luz Maria Vail MD

## 2024-03-01 NOTE — PROCEDURES
Vascular Access Team Procedure Note     Visit Date: 3/1/2024      Patient Name: Ayanna Gross         MRN: [id number]             Procedure: Pt has a R chest dialysis catheter, pt due for routine drsg change, drsg has been changed using sterile technique, site without any redness, swelling or drainage. Pt also has a dual lumen picc line, drsg dry and intact (dated 2/26) without any redness, swelling or drainage, both lumens currently in use and infusing without any difficulty.                           Fidelina Medina RN  3/1/2024  4:32 PM

## 2024-03-01 NOTE — CARE PLAN
Problem: Respiratory  Goal: Patent airway maintained this shift  2/29/2024 2009 by García Bustillo RRT  Outcome: Progressing  2/29/2024 2008 by García Bustillo RRT  Outcome: Progressing  Goal: Tolerate pulmonary toileting this shift  2/29/2024 2009 by García Bustillo RRT  Outcome: Progressing  2/29/2024 2008 by García Bustillo RRT  Outcome: Progressing  Goal: Verbalize decreased shortness of breath this shift  2/29/2024 2009 by García Bustillo RRT  Outcome: Progressing  2/29/2024 2008 by García Bustillo RRT  Outcome: Progressing  Goal: Increase self care and/or family involvement in next 24 hours  2/29/2024 2009 by García Bustillo RRT  Outcome: Progressing  2/29/2024 2008 by García Bustillo RRT  Outcome: Progressing  Goal: No signs of respiratory distress (eg. Use of accessory muscles. Peds grunting)  2/29/2024 2009 by García Bustillo RRT  Outcome: Progressing  2/29/2024 2008 by García Bustillo RRT  Outcome: Progressing

## 2024-03-01 NOTE — PROGRESS NOTES
CONSULT PROGRESS NOTES    SERVICE DATE: 3/1/2024   SERVICE TIME: 11:30 AM    CONSULTING SERVICE: Nephrology    ASSESSMENT AND PLAN   73-year-old dialysis patient in the ICU for prolonged hospitalization with chronic hypotension.  1.  ESRD  2.  Hypotension  3.  Hyponatremia  4.  Hypokalemia  5.  Anemia and chronic kidney disease     She remains dependent on norepinephrine infusion.  We are continuing with thrice weekly hemodialysis.  Use [facility] today for 3.25 hours with attempted 2 L volume removal, may need to increase the pressors for treatment.  Using low-temperature dialysate.  Using maximum dose midodrine.  I have discontinued the fludrocortisone as it is doing no good at this point.  Using maximum dose erythropoietin stimulating agents.  Sounds that she may have been approved at [facility].  Dr. Mcgee is available this weekend.    SUBJECTIVE  INTERVAL HPI: No new issues or concerns.  Blood pressure remains low, she remains dependent on norepinephrine.  She has a decent appetite.  She is in good spirits today.    MEDICATIONS:  apixaban, 2.5 mg, oral, BID  doxycylcine, 100 mg, oral, Daily  epoetin di or biosimilar, 10,000 Units, intravenous, Every Mon/Wed/Fri  escitalopram, 10 mg, oral, Nightly  gabapentin, 100 mg, oral, TID  heparin, 2,000 Units, intra-catheter, After Dialysis  heparin, 2,000 Units, intra-catheter, After Dialysis  insulin lispro, 0-10 Units, subcutaneous, TID with meals  ipratropium-albuteroL, 3 mL, nebulization, q6h while awake  lidocaine, 5 mL, infiltration, Once  midodrine, 30 mg, oral, TID with meals  nystatin, , Topical, BID  nystatin, , Topical, BID  pantoprazole, 40 mg, oral, Daily   Or  pantoprazole, 40 mg, intravenous, Daily  perflutren lipid microspheres, 0.5-10 mL of dilution, intravenous, Once in imaging  perflutren protein A microsphere, 0.5 mL, intravenous, Once in imaging  psyllium, 1 packet, oral, BID  sulfur hexafluoride microsphr, 2 mL, intravenous, Once in  imaging  sulfur hexafluoride microsphr, 2 mL, intravenous, Once in imaging  vancomycin, 125 mg, oral, BID  zinc oxide, 1 Application, Topical, BID       norepinephrine, 0.01-3 mcg/kg/min, Last Rate: 0.05 mcg/kg/min (03/01/24 1032)       PRN medications: acetaminophen, albumin human, alteplase, ammonium lactate, benzocaine-menthoL, dextrose 10 % in water (D10W), dextrose, glucagon, heparin flush, heparin flush, ondansetron, oxygen, sennosides-docusate sodium     OBJECTIVE  PHYSICAL EXAM:   Heart Rate:  []   Temp:  [35.9 °C (96.6 °F)-36.6 °C (97.9 °F)]   Resp:  [11-24]   BP: ()/()   Weight:  [106 kg (232 lb 12.9 oz)]   SpO2:  [91 %-100 %]   Body mass index is 42.58 kg/m².  This is a chronically ill-appearing white woman, seems older than her known age  Very pale skin  Moist mucosa  Somewhat confused, but appropriate affect  Regular heart rate  Breathing is unlabored  She has no major abdominal distention  She has bilateral peripheral edema present  Internal jugular tunneled hemodialysis catheter in place  Right-sided PICC in place  Rectal tube in place  No Amos catheter    DATA:   Labs:  Results for orders placed or performed during the hospital encounter of 01/19/24 (from the past 96 hour(s))   POCT GLUCOSE   Result Value Ref Range    POCT Glucose 146 (H) 74 - 99 mg/dL   POCT GLUCOSE   Result Value Ref Range    POCT Glucose 180 (H) 74 - 99 mg/dL   CBC and Auto Differential   Result Value Ref Range    WBC 10.1 4.4 - 11.3 x10*3/uL    nRBC 0.0 0.0 - 0.0 /100 WBCs    RBC 2.88 (L) 4.00 - 5.20 x10*6/uL    Hemoglobin 8.9 (L) 12.0 - 16.0 g/dL    Hematocrit 28.1 (L) 36.0 - 46.0 %    MCV 98 80 - 100 fL    MCH 30.9 26.0 - 34.0 pg    MCHC 31.7 (L) 32.0 - 36.0 g/dL    RDW 19.4 (H) 11.5 - 14.5 %    Platelets 199 150 - 450 x10*3/uL    Neutrophils % 69.7 40.0 - 80.0 %    Immature Granulocytes %, Automated 0.4 0.0 - 0.9 %    Lymphocytes % 15.0 13.0 - 44.0 %    Monocytes % 7.8 2.0 - 10.0 %    Eosinophils % 5.7 0.0 -  6.0 %    Basophils % 1.4 0.0 - 2.0 %    Neutrophils Absolute 7.07 (H) 1.60 - 5.50 x10*3/uL    Immature Granulocytes Absolute, Automated 0.04 0.00 - 0.50 x10*3/uL    Lymphocytes Absolute 1.52 0.80 - 3.00 x10*3/uL    Monocytes Absolute 0.79 0.05 - 0.80 x10*3/uL    Eosinophils Absolute 0.58 (H) 0.00 - 0.40 x10*3/uL    Basophils Absolute 0.14 (H) 0.00 - 0.10 x10*3/uL   Basic metabolic panel   Result Value Ref Range    Glucose 195 (H) 65 - 99 mg/dL    Sodium 129 (L) 133 - 145 mmol/L    Potassium 3.6 3.4 - 5.1 mmol/L    Chloride 95 (L) 97 - 107 mmol/L    Bicarbonate 18 (L) 24 - 31 mmol/L    Urea Nitrogen 23 8 - 25 mg/dL    Creatinine 2.50 (H) 0.40 - 1.60 mg/dL    eGFR 20 (L) >60 mL/min/1.73m*2    Calcium 8.9 8.5 - 10.4 mg/dL    Anion Gap 16 <=19 mmol/L   Magnesium   Result Value Ref Range    Magnesium 1.80 1.60 - 3.10 mg/dL   POCT GLUCOSE   Result Value Ref Range    POCT Glucose 180 (H) 74 - 99 mg/dL   POCT GLUCOSE   Result Value Ref Range    POCT Glucose 200 (H) 74 - 99 mg/dL   POCT GLUCOSE   Result Value Ref Range    POCT Glucose 180 (H) 74 - 99 mg/dL   POCT GLUCOSE   Result Value Ref Range    POCT Glucose 182 (H) 74 - 99 mg/dL   CBC and Auto Differential   Result Value Ref Range    WBC 10.0 4.4 - 11.3 x10*3/uL    nRBC 0.0 0.0 - 0.0 /100 WBCs    RBC 3.01 (L) 4.00 - 5.20 x10*6/uL    Hemoglobin 9.4 (L) 12.0 - 16.0 g/dL    Hematocrit 29.8 (L) 36.0 - 46.0 %    MCV 99 80 - 100 fL    MCH 31.2 26.0 - 34.0 pg    MCHC 31.5 (L) 32.0 - 36.0 g/dL    RDW 19.2 (H) 11.5 - 14.5 %    Platelets 247 150 - 450 x10*3/uL    Neutrophils % 62.0 40.0 - 80.0 %    Immature Granulocytes %, Automated 0.4 0.0 - 0.9 %    Lymphocytes % 20.3 13.0 - 44.0 %    Monocytes % 10.3 2.0 - 10.0 %    Eosinophils % 5.8 0.0 - 6.0 %    Basophils % 1.2 0.0 - 2.0 %    Neutrophils Absolute 6.22 (H) 1.60 - 5.50 x10*3/uL    Immature Granulocytes Absolute, Automated 0.04 0.00 - 0.50 x10*3/uL    Lymphocytes Absolute 2.04 0.80 - 3.00 x10*3/uL    Monocytes Absolute 1.03 (H)  0.05 - 0.80 x10*3/uL    Eosinophils Absolute 0.58 (H) 0.00 - 0.40 x10*3/uL    Basophils Absolute 0.12 (H) 0.00 - 0.10 x10*3/uL   Basic metabolic panel   Result Value Ref Range    Glucose 251 (H) 65 - 99 mg/dL    Sodium 131 (L) 133 - 145 mmol/L    Potassium 3.6 3.4 - 5.1 mmol/L    Chloride 96 (L) 97 - 107 mmol/L    Bicarbonate 19 (L) 24 - 31 mmol/L    Urea Nitrogen 28 (H) 8 - 25 mg/dL    Creatinine 3.10 (H) 0.40 - 1.60 mg/dL    eGFR 16 (L) >60 mL/min/1.73m*2    Calcium 9.1 8.5 - 10.4 mg/dL    Anion Gap 16 <=19 mmol/L   Magnesium   Result Value Ref Range    Magnesium 1.90 1.60 - 3.10 mg/dL   POCT GLUCOSE   Result Value Ref Range    POCT Glucose 219 (H) 74 - 99 mg/dL   POCT GLUCOSE   Result Value Ref Range    POCT Glucose 159 (H) 74 - 99 mg/dL   POCT GLUCOSE   Result Value Ref Range    POCT Glucose 175 (H) 74 - 99 mg/dL   POCT GLUCOSE   Result Value Ref Range    POCT Glucose 151 (H) 74 - 99 mg/dL   CBC and Auto Differential   Result Value Ref Range    WBC 8.8 4.4 - 11.3 x10*3/uL    nRBC 0.0 0.0 - 0.0 /100 WBCs    RBC 3.00 (L) 4.00 - 5.20 x10*6/uL    Hemoglobin 9.4 (L) 12.0 - 16.0 g/dL    Hematocrit 30.1 (L) 36.0 - 46.0 %     80 - 100 fL    MCH 31.3 26.0 - 34.0 pg    MCHC 31.2 (L) 32.0 - 36.0 g/dL    RDW 19.0 (H) 11.5 - 14.5 %    Platelets 224 150 - 450 x10*3/uL    Neutrophils % 58.7 40.0 - 80.0 %    Immature Granulocytes %, Automated 0.3 0.0 - 0.9 %    Lymphocytes % 21.4 13.0 - 44.0 %    Monocytes % 10.0 2.0 - 10.0 %    Eosinophils % 8.0 0.0 - 6.0 %    Basophils % 1.6 0.0 - 2.0 %    Neutrophils Absolute 5.19 1.60 - 5.50 x10*3/uL    Immature Granulocytes Absolute, Automated 0.03 0.00 - 0.50 x10*3/uL    Lymphocytes Absolute 1.89 0.80 - 3.00 x10*3/uL    Monocytes Absolute 0.88 (H) 0.05 - 0.80 x10*3/uL    Eosinophils Absolute 0.71 (H) 0.00 - 0.40 x10*3/uL    Basophils Absolute 0.14 (H) 0.00 - 0.10 x10*3/uL   Basic metabolic panel   Result Value Ref Range    Glucose 200 (H) 65 - 99 mg/dL    Sodium 133 133 - 145  mmol/L    Potassium 3.5 3.4 - 5.1 mmol/L    Chloride 98 97 - 107 mmol/L    Bicarbonate 20 (L) 24 - 31 mmol/L    Urea Nitrogen 20 8 - 25 mg/dL    Creatinine 2.40 (H) 0.40 - 1.60 mg/dL    eGFR 21 (L) >60 mL/min/1.73m*2    Calcium 9.0 8.5 - 10.4 mg/dL    Anion Gap 15 <=19 mmol/L   Magnesium   Result Value Ref Range    Magnesium 1.80 1.60 - 3.10 mg/dL   POCT GLUCOSE   Result Value Ref Range    POCT Glucose 182 (H) 74 - 99 mg/dL   POCT GLUCOSE   Result Value Ref Range    POCT Glucose 162 (H) 74 - 99 mg/dL   POCT GLUCOSE   Result Value Ref Range    POCT Glucose 181 (H) 74 - 99 mg/dL   POCT GLUCOSE   Result Value Ref Range    POCT Glucose 205 (H) 74 - 99 mg/dL   CBC and Auto Differential   Result Value Ref Range    WBC 9.4 4.4 - 11.3 x10*3/uL    nRBC 0.0 0.0 - 0.0 /100 WBCs    RBC 2.90 (L) 4.00 - 5.20 x10*6/uL    Hemoglobin 8.9 (L) 12.0 - 16.0 g/dL    Hematocrit 28.6 (L) 36.0 - 46.0 %    MCV 99 80 - 100 fL    MCH 30.7 26.0 - 34.0 pg    MCHC 31.1 (L) 32.0 - 36.0 g/dL    RDW 18.6 (H) 11.5 - 14.5 %    Platelets 228 150 - 450 x10*3/uL    Neutrophils % 57.0 40.0 - 80.0 %    Immature Granulocytes %, Automated 0.3 0.0 - 0.9 %    Lymphocytes % 23.1 13.0 - 44.0 %    Monocytes % 11.3 2.0 - 10.0 %    Eosinophils % 6.9 0.0 - 6.0 %    Basophils % 1.4 0.0 - 2.0 %    Neutrophils Absolute 5.34 1.60 - 5.50 x10*3/uL    Immature Granulocytes Absolute, Automated 0.03 0.00 - 0.50 x10*3/uL    Lymphocytes Absolute 2.16 0.80 - 3.00 x10*3/uL    Monocytes Absolute 1.06 (H) 0.05 - 0.80 x10*3/uL    Eosinophils Absolute 0.65 (H) 0.00 - 0.40 x10*3/uL    Basophils Absolute 0.13 (H) 0.00 - 0.10 x10*3/uL   Basic metabolic panel   Result Value Ref Range    Glucose 242 (H) 65 - 99 mg/dL    Sodium 134 133 - 145 mmol/L    Potassium 3.4 3.4 - 5.1 mmol/L    Chloride 98 97 - 107 mmol/L    Bicarbonate 21 (L) 24 - 31 mmol/L    Urea Nitrogen 25 8 - 25 mg/dL    Creatinine 3.00 (H) 0.40 - 1.60 mg/dL    eGFR 16 (L) >60 mL/min/1.73m*2    Calcium 8.9 8.5 - 10.4 mg/dL     Anion Gap 15 <=19 mmol/L   Magnesium   Result Value Ref Range    Magnesium 1.70 1.60 - 3.10 mg/dL   POCT GLUCOSE   Result Value Ref Range    POCT Glucose 209 (H) 74 - 99 mg/dL   POCT GLUCOSE   Result Value Ref Range    POCT Glucose 180 (H) 74 - 99 mg/dL         SIGNATURE: Saeed Mondragon MD PATIENT NAME: Ayanna Gross   DATE: March 1, 2024 MRN: [id number]   TIME: 11:30 AM PAGER: [phone]

## 2024-03-01 NOTE — PROGRESS NOTES
Ayanna Gross is a 73 y.o. female on day 42 of admission presenting with Septic shock (CMS/HCC).    Subjective   Interval History:   Awake, alert  Discussed with nursing   Afebrile, no chills  Remains On pressors  Denies shortness of breath or chest pain          Objective   Range of Vitals (last 24 hours)  Heart Rate:  []   Temp:  [35.9 °C (96.6 °F)-36.6 °C (97.9 °F)]   Resp:  [11-24]   BP: ()/()   Weight:  [106 kg (232 lb 12.9 oz)]   SpO2:  [91 %-100 %]   Daily Weight  03/01/24 : 106 kg (232 lb 12.9 oz)    Body mass index is 42.58 kg/m².    Physical Exam  Constitutional:       Appearance: Normal appearance.   HENT:      Head: Normocephalic and atraumatic.      Nose: Nose normal.      Mouth/Throat:      Mouth: Mucous membranes are moist.      Pharynx: Oropharynx is clear.   Eyes:      Conjunctiva/sclera: Conjunctivae normal.   Cardiovascular:      Rate and Rhythm: Normal rate and regular rhythm.   Pulmonary:      Comments: Decreased breath sounds bilateral   Abdominal:      General: Bowel sounds are normal.      Palpations: Abdomen is soft.   Musculoskeletal:         General: Normal range of motion.      Cervical back: Normal range of motion and neck supple.   Skin:     Comments: Bilateral posterior heel ulcers with moderate amount of granulation tissue with some eschar     Neurological:      Mental Status: She is alert.      Comments: Awake, alert   Psychiatric:         Mood and Affect: Mood normal.         Behavior: Behavior normal.           Antibiotics  aspirin tablet 325 mg  acetaminophen (Tylenol) tablet 650 mg  cefepime (Maxipime) 1 g in dextrose 5 % 50 mL IV  sodium chloride 0.9 % bolus 2,229 mL  apixaban (Eliquis) tablet 2.5 mg  escitalopram (Lexapro) tablet 10 mg  febuxostat (Uloric) tablet 40 mg  fenofibrate (Triglide) tablet 160 mg  gabapentin (Neurontin) capsule 100 mg  midodrine (Proamatine) tablet 15 mg  nystatin (Mycostatin) 100,000 unit/gram powder  oxyCODONE-acetaminophen  (Percocet) 5-325 mg per tablet 1 tablet  simvastatin (Zocor) tablet 20 mg  piperacillin-tazobactam-dextrose (Zosyn) IV 2.25 g  vancomycin (Vancocin) capsule 125 mg  oxygen (O2) therapy  dextrose 50 % injection 25 g  glucagon (Glucagen) injection 1 mg  dextrose 10 % in water (D10W) infusion  insulin lispro (HumaLOG) injection 0-10 Units  nystatin (Mycostatin) 100,000 unit/gram powder 1 Application  vancomycin-diluent combo no.1 (Xellia) IVPB 1,750 mg  piperacillin-tazobactam-dextrose (Zosyn) IV 2.25 g  sodium chloride 0.9 % bolus 500 mL  norepinephrine (Levophed) 8 mg in dextrose 5% 250 mL (0.032 mg/mL) infusion (premix)  vancomycin (Vancocin) placeholder  pantoprazole (ProtoNix) EC tablet 40 mg  pantoprazole (ProtoNix) injection 40 mg  sennosides-docusate sodium (Judy-Colace) 8.6-50 mg per tablet 1 tablet  doxycycline (Vibramycin) in dextrose 5 % in water (D5W) 100 mL  mg  LORazepam (Ativan) injection 2 mg  magnesium sulfate IV 2 g  zinc oxide 20 % ointment 1 Application  nystatin (Mycostatin) cream  norepinephrine (Levophed) 8 mg in dextrose 5% 250 mL (0.032 mg/mL) infusion (premix)  heparin 1,000 unit/mL injection 2,000 Units  heparin 1,000 unit/mL injection 2,000 Units  nystatin (Mycostatin) ointment  acetaminophen (Tylenol) oral liquid 1,000 mg  albumin human 25 % solution 12.5 g  perflutren lipid microspheres (Definity) injection 0.5-10 mL of dilution  sulfur hexafluoride microsphr (Lumason) injection 24.28 mg  perflutren protein A microsphere (Optison) injection 0.5 mL  ipratropium-albuteroL (Duo-Neb) 0.5-2.5 mg/3 mL nebulizer solution 3 mL  sodium chloride 3 % nebulizer solution 3 mL  vancomycin-diluent combo no.1 (Xellia) IVPB 750 mg  sennosides-docusate sodium (Judy-Colace) 8.6-50 mg per tablet 1 tablet  acetaminophen (Tylenol) tablet 650 mg  doxycycline (Vibramycin) capsule 100 mg  magnesium oxide (Mag-Ox) tablet 400 mg  vasopressin (Vasostrict) 0.2 unit/mL infusion  norepinephrine (Levophed) 8 mg in  dextrose 5% 250 mL (0.032 mg/mL) infusion (premix)  heparin 1,000 unit/mL injection 2,000 Units  heparin 1,000 unit/mL injection 2,000 Units  albumin human 25 % solution 12.5 g  vancomycin (Xellia) 1 g in 200 mL (Xellia) IVPB 1 g  ipratropium-albuteroL (Duo-Neb) 0.5-2.5 mg/3 mL nebulizer solution 3 mL  sodium chloride 3 % nebulizer solution 3 mL  albumin human 5 % infusion 12.5 g  heparin 1,000 unit/mL injection 2,000 Units  heparin 1,000 unit/mL injection 2,000 Units  vancomycin (Vancocin) capsule 125 mg  albumin human 25 % solution 12.5 g  heparin 1,000 unit/mL injection 2,000 Units  heparin 1,000 unit/mL injection 2,000 Units  ipratropium-albuteroL (Duo-Neb) 0.5-2.5 mg/3 mL nebulizer solution 3 mL  lidocaine (Xylocaine) 10 mg/mL (1 %) injection 50 mg  sodium phosphate 15 mmol in sodium chloride 0.9% 250 mL IV  sod phos di, mono-K phos mono (K Phos Neutral) tablet 250 mg  potassium, sodium phosphates (Phos-NaK) 280-160-250 mg packet 1 packet  doxycycline (Vibramycin) capsule 100 mg  fludrocortisone (Florinef) tablet 0.1 mg  gabapentin (Neurontin) capsule 100 mg  acetaminophen (Tylenol) tablet 1,000 mg  benzocaine-menthoL (Dermoplast) topical spray  vancomycin (Vancocin) capsule 125 mg  heparin 1,000 unit/mL injection 2,000 Units  heparin 1,000 unit/mL injection 2,000 Units  magnesium sulfate IV 2 g  albumin human 25 % solution 12.5 g  promethazine (Phenergan) injection 12.5 mg  ondansetron (Zofran) injection 4 mg  lidocaine (Xylocaine) 10 mg/mL (1 %) injection 50 mg  alteplase (Cathflo Activase) injection 2 mg  acetaminophen (Tylenol) tablet 650 mg  psyllium (Metamucil) 3.4 gram packet 1 packet  perflutren lipid microspheres (Definity) injection 0.5-10 mL of dilution  sulfur hexafluoride microsphr (Lumason) injection 24.28 mg  perflutren protein A microsphere (Optison) injection 0.5 mL  epoetin di-epbx (Retacrit) injection 10,000 Units  sulfur hexafluoride microsphr (Lumason) injection 24.28 mg  perflutren lipid  microspheres (Definity) injection 0.5-10 mL of dilution  perflutren lipid microspheres (Definity) injection 0.5-10 mL of dilution  sulfur hexafluoride microsphr (Lumason) injection 24.28 mg  perflutren protein A microsphere (Optison) injection 0.5 mL  ammonium lactate (Lac-Hydrin) 12 % lotion 1 Application  norepinephrine (Levophed) 8 mg in dextrose 5% 250 mL (0.032 mg/mL) infusion (premix)  fludrocortisone (Florinef) tablet 0.1 mg  albumin human 25 % solution 25 g  albumin human 25 % solution 25 g  meropenem (Merrem) 500 mg in sodium chloride 0.9 % 100 mL IV  potassium chloride 20 mEq in 100 mL IV premix  potassium chloride 20 mEq in 100 mL IV premix  magnesium sulfate IV 2 g  cefepime (Maxipime) 1 g in dextrose 5 % 50 mL IV  vancomycin-diluent combo no.1 (Xellia) IVPB 750 mg  albumin human 25 % solution 12.5 g  vancomycin (Xellia) 1 g in 200 mL (Xellia) IVPB 1 g  vancomycin (Vancocin) placeholder  vancomycin (Xellia) 1 g in 200 mL (Xellia) IVPB 1 g  midodrine (Proamatine) tablet 20 mg  vancomycin-diluent combo no.1 (Xellia) IVPB 750 mg  magnesium sulfate IV 2 g  heparin flush 100 unit/mL syringe 160 Units  heparin flush 100 unit/mL syringe 160 Units  vancomycin (Vancocin) 500 mg in dextrose 5 % 100 mL IV  albumin human 25 % solution 12.5 g  magnesium sulfate IV 2 g  cefepime (Maxipime) 1 g in dextrose 5 % 50 mL IV  vancomycin (Vancocin) placeholder  cefepime (Maxipime) 1 g in dextrose 5 % 50 mL IV  vancomycin (Vancocin) 500 mg in dextrose 5 % 100 mL IV      Relevant Results  Labs  Results from last 72 hours   Lab Units 03/01/24  0619 02/29/24  0452 02/28/24  0634   WBC AUTO x10*3/uL 9.4 8.8 10.0   HEMOGLOBIN g/dL 8.9* 9.4* 9.4*   HEMATOCRIT % 28.6* 30.1* 29.8*   PLATELETS AUTO x10*3/uL 228 224 247   NEUTROS PCT AUTO % 57.0 58.7 62.0   LYMPHS PCT AUTO % 23.1 21.4 20.3   MONOS PCT AUTO % 11.3 10.0 10.3   EOS PCT AUTO % 6.9 8.0 5.8       Results from last 72 hours   Lab Units 03/01/24  0619 02/29/24  0452  02/28/24  0634   SODIUM mmol/L 134 133 131*   POTASSIUM mmol/L 3.4 3.5 3.6   CHLORIDE mmol/L 98 98 96*   CO2 mmol/L 21* 20* 19*   BUN mg/dL 25 20 28*   CREATININE mg/dL 3.00* 2.40* 3.10*   GLUCOSE mg/dL 242* 200* 251*   CALCIUM mg/dL 8.9 9.0 9.1   ANION GAP mmol/L 15 15 16   EGFR mL/min/1.73m*2 16* 21* 16*           Estimated Creatinine Clearance: 19.7 mL/min (A) (by C-G formula based on SCr of 3 mg/dL (H)).  C-Reactive Protein   Date Value Ref Range Status   12/25/2023 5.20 (H) 0.00 - 2.00 mg/dL Final     CRP   Date Value Ref Range Status   06/23/2023 19.9 (H) 0 - 2.0 MG/DL Final     Comment:     Performed at [facility] [id number]   05/22/2022 1.0 0 - 2.0 MG/DL Final     Comment:     Performed at [facility] [address]     Microbiology   reviewed  Imaging  Transthoracic Echo (TTE) Limited    Result Date: 2/7/2024           [place] [address]            Phone [phone] TRANSTHORACIC ECHOCARDIOGRAM REPORT  Patient Name:      BASIATRAVIS Gray Physician:    [id number] Littlejohn DO Study Date:        2/7/2024            Ordering Provider:    [id number] LITTLEJOHN MRN/PID:           [id number]            Fellow: Accession#:        [id number]        Nurse: Date of Birth/Age: 1950 / 73 years Sonographer:          Rena QUARLES,                                                              FABIAN CONLEY Gender:            F                   Additional Staff: Height:            157.48 cm           Admit Date: Weight:                                Admission Status:     Inpatient - Routine BSA:               m2                  Department Location:  [facility] Blood Pressure: 95 /42 mmHg Study Type:    TRANSTHORACIC ECHO  (TTE) LIMITED Diagnosis/ICD: Hypotension, unspecified-I95.9; Mitral valve disorder-I05.9 Indication:    Limited to assess for MS, Hypotension CPT Codes:     Echo Limited-54267; Color Doppler-93083; Doppler Limited-53629 Patient History: Pertinent History: Hypotension, hx of MV endocarditis, PAD, DM2 ESRD on Hd. Study Detail: The following Echo studies were performed: 2D, M-Mode, Doppler and               color flow. Technically challenging study due to poor acoustic               windows and L Breast implant. Unable to obtain suprasternal notch               view.  PHYSICIAN INTERPRETATION: Left Ventricle: Left ventricular systolic function is normal. There are no regional wall motion abnormalities. The left ventricular cavity size is normal. Left ventricular diastolic filling was not assessed. Left Atrium: The left atrium is moderate to severely dilated. Right Ventricle: The right ventricle is normal in size. There is normal right ventricular global systolic function. Right Atrium: The right atrium was not well visualized. Aortic Valve: The aortic valve was not assessed. Aortic valve regurgitation was not assessed. Mitral Valve: The mitral valve is abnormal. There is evidence of mild mitral valve stenosis. The doppler estimated mean and peak diastolic pressure gradients are 4.0 mmHg and 6.6 mmHg respectively. There is severe mitral annular calcification. There is no evidence of mitral valve regurgitation. Tricuspid Valve: The tricuspid valve was not assessed. Tricuspid regurgitation was not assessed. Pulmonic Valve: The pulmonic valve is not well visualized. The pulmonic valve regurgitation was not assessed. Pericardium: There is no pericardial effusion noted. Aorta: The aortic root was not assessed.  CONCLUSIONS:  1. Left ventricular systolic function is normal.  2. The left atrium is moderate to severely dilated.  3. There is severe mitral annular calcification. QUANTITATIVE DATA SUMMARY: LV DIASTOLIC FUNCTION:                         Normal Ranges: MV Peak E:    1.23 m/s (0.7-1.2 m/s) MV Peak A:    1.12 m/s (0.42-0.7 m/s) E/A Ratio:    1.10     (1.0-2.2) MV lateral e' 0.04 m/s MV medial e'  0.07 m/s MITRAL VALVE:                      Normal Ranges: MV Vmax:    1.28 m/s (<=1.3m/s) MV peak P.6 mmHg (<5mmHg) MV mean P.0 mmHg (<48mmHg)  RIGHT VENTRICLE: RV Basal 2.97 cm  [id number] Herminio Lemus DO Electronically signed on 2024 at 3:50:21 PM  ** Final **     Transthoracic Echo (TTE) Limited    Result Date: 2024           [place] [address]            Phone [phone] TRANSTHORACIC ECHOCARDIOGRAM REPORT  Patient Name:      BASIA Gray Physician:    [id number] Lemus DO Study Date:        2024            Ordering Provider:    [id number] REBECA RESENDEZ MRN/PID:           [id number]            Fellow: Accession#:        [id number]        Nurse: Date of Birth/Age: 1950 / 73 years Sonographer:          Jennifer WALL Gender:            F                   Additional Staff: Height:            157.48 cm           Admit Date:           2024 Weight:            98.88 kg            Admission Status:     Inpatient - Routine BSA:               1.98 m2             Department Location: Blood Pressure: 59 /49 mmHg Study Type:    TRANSTHORACIC ECHO (TTE) LIMITED Diagnosis/ICD: Acute on chronic diastolic (congestive) heart failure                (CHF)-I50.33 Indication:    Acute on chronic diastolic congestive heart failure CPT Codes:     Echo Limited-23815; Color Doppler-70832; Doppler Limited-91044 Patient History: Pertinent History: PAF Peripheralvascular disease low blood pressure HTN Breast                    Cancer Mixed Hyperlipidemia HF CVA Hypotension DMII CAD CKD. Study Detail: The following Echo studies were performed: 2D,  M-Mode, Doppler and               color flow. Technically challenging study due to body habitus,               patient lying in supine position, poor acoustic windows, prominent               lung artifact and Breast Prosthesis. Definity used as a contrast               agent for endocardial border definition. Unable to obtain               suprasternal notch view.  PHYSICIAN INTERPRETATION: Left Ventricle: Left ventricular systolic function is normal, with an estimated ejection fraction of 60-65%. There are no regional wall motion abnormalities. The left ventricular cavity size is normal. Left ventricular diastolic filling was indeterminate. Left Atrium: The left atrium is moderately dilated. Right Ventricle: The right ventricle is normal in size. There is normal right ventricular global systolic function. Right Atrium: The right atrium is normal in size. Aortic Valve: The aortic valve is trileaflet. There is no evidence of aortic valve regurgitation. The peak instantaneous gradient of the aortic valve is 5.3 mmHg. Mitral Valve: The mitral valve is normal in structure. There is severe mitral annular calcification. There is trace mitral valve regurgitation. Tricuspid Valve: The tricuspid valve is structurally normal. There is mild tricuspid regurgitation. Pulmonic Valve: The pulmonic valve is not well visualized. The pulmonic valve regurgitation was not well visualized. Pericardium: There is no pericardial effusion noted. Aorta: The aortic root is normal.  CONCLUSIONS:  1. Left ventricular systolic function is normal with a 60-65% estimated ejection fraction.  2. The left atrium is moderately dilated.  3. There is severe mitral annular calcification. QUANTITATIVE DATA SUMMARY: 2D MEASUREMENTS:                          Normal Ranges: LAs:           3.80 cm   (2.7-4.0cm) IVSd:          0.92 cm   (0.6-1.1cm) LVPWd:         1.09 cm   (0.6-1.1cm) LVIDd:         2.98 cm   (3.9-5.9cm) LVIDs:         2.12 cm LV Mass Index:  41.2 g/m2 LV % FS        28.9 % LA VOLUME:                               Normal Ranges: LA Vol A4C:        58.3 ml    (22+/-6mL/m2) LA Vol A2C:        60.9 ml LA Vol BP:         62.1 ml LA Vol Index A4C:  29.4ml/m2 LA Vol Index A2C:  30.7 ml/m2 LA Vol Index BP:   31.3 ml/m2 LA Area A4C:       20.5 cm2 LA Area A2C:       20.1 cm2 LA Major Axis A4C: 6.1 cm LA Major Axis A2C: 5.6 cm LA Volume Index:   29.3 ml/m2 LA Vol A4C:        53.1 ml LA Vol A2C:        58.4 ml LV SYSTOLIC FUNCTION BY 2D PLANIMETRY (MOD):                     Normal Ranges: EF-A4C View: 68.2 % (>=55%) EF-A2C View: 65.9 % EF-Biplane:  67.8 % AORTIC VALVE:                         Normal Ranges: AoV Vmax:      1.15 m/s (<=1.7m/s) AoV Peak P.3 mmHg (<20mmHg) LVOT Max Shimon:  1.11 m/s (<=1.1m/s) LVOT VTI:      16.20 cm LVOT Diameter: 1.90 cm  (1.8-2.4cm) AoV Area,Vmax: 2.74 cm2 (2.5-4.5cm2) PULMONIC VALVE:                         Normal Ranges: PV Accel Time: 63 msec  (>120ms) PV Max Shimon:    1.0 m/s  (0.6-0.9m/s) PV Max P.3 mmHg  [id number] Herminio Lemus DO Electronically signed on 2024 at 7:58:25 AM  ** Final **     Assessment/Plan   Septic shock-patient on pressors  Left-sided pleural effusion   Left lower lobe pneumonia-treated  Proteus urinary tract infection-treated  History of MRSA endocarditis  History of C. 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

## 2024-03-18 NOTE — PROGRESS NOTES
Subjective   Patient ID: Ayanna Gross is a 73 y.o. female who is acute skilled care and presents for initial visit for skilled nursing.    This is admission H&P.  Was hospitalized with hypotension at dialysis, MRSA bacteremia, treated with vancomycin and to continue doxycycline on discharge.  She was treated with IV pressors and midodrine.  Also developed diarrhea, due to C. difficile, treated with vancomycin.  She had bilateral heel ulcers, evaluated by vascular surgeon, no procedure was recommended.  She is alert, pleasant, denies any shortness of breath.  She is complaining of occasional abdominal pain, still has loose stools.  Appetite fair.  She requires assistance with transfers and ambulation         Review of Systems   Constitutional:  Negative for fever and unexpected weight change.   HENT:  Negative for sore throat.    Respiratory:  Negative for cough and shortness of breath.    Cardiovascular:  Negative for chest pain and palpitations.   Gastrointestinal:  Negative for abdominal pain, constipation, diarrhea, nausea and vomiting.   Genitourinary:  Negative for difficulty urinating and frequency.   Musculoskeletal:  Positive for arthralgias. Negative for back pain.   Skin:  Negative for rash.   Neurological:  Negative for dizziness, seizures and headaches.   Psychiatric/Behavioral:  Negative for agitation. The patient is not nervous/anxious.        Objective   There were no vitals taken for this visit.    Physical Exam  Vitals reviewed.   Constitutional:       General: She is not in acute distress.  HENT:      Mouth/Throat:      Mouth: Mucous membranes are moist.   Cardiovascular:      Rate and Rhythm: Regular rhythm.      Heart sounds: Normal heart sounds.   Pulmonary:      Breath sounds: Normal breath sounds. No rales.   Abdominal:      Palpations: Abdomen is soft.      Tenderness: There is no abdominal tenderness.   Musculoskeletal:         General: No tenderness.      Cervical back: Neck supple. No  tenderness.   Skin:     General: Skin is warm.      Comments: Bilateral stage IV heel ulcers with no drainage   Neurological:      General: No focal deficit present.      Mental Status: She is alert.   Psychiatric:         Behavior: Behavior normal.         Assessment/Plan   Diagnoses and all orders for this visit:  Difficulty in walking  Primary hypertension  Chronic combined systolic and diastolic heart failure (CMS/HCC)  Type 2 diabetes mellitus with other specified complication, with long-term current use of insulin (CMS/[facility])  Chronic kidney disease (CKD), stage IV (severe) (CMS/[facility])  Peripheral vascular disease (CMS/[facility])     Goals    None     PT and OT evaluation, fall precautions.  Will check labs.  Will arrange for ongoing dialysis.  Wound care for bilateral heel ulcers   Normal Plan: \\n1.  Continue triamcinolone 0.1% ointment twice daily as needed up to 15 days/month maximum for the psoriasis.  Avoid face and body folds.  When not applying triamcinolone, use Vaseline twice a day.\\n2.  Patient was seen at the [facility] in October.  He is scheduled to follow-up with them on a as needed basis.  We may need to use them in the future for phototherapy or systemic therapy if he breaks through his topical treatment. Detail Level: Detailed

## 2024-03-21 NOTE — DISCHARGE SUMMARY
Discharge Diagnosis  Septic shock (CMS/[facility])    Hospital Course   73 YOF with h/o Afib, ESRD, C. Diff infection, recurrent pleural effusions, and MRSA bacteremia with MV vegetation who initially presented 1/19/24 with anemia, fever and worsening sacral and lower extremities wounds. In the ED found to be hypotensive, anemic. Received IVF, antibiotics and admitted to the ICU with the diagnosis of septic shock. Cultures positive for proteus UTI. Imaging consistent with LLL pneumonia. Course complicated by AMS, due to worsening shock, requiring vasopressors. Since then patient remains hypotensive on norepinephrine at variable doses. Had thoracentesis done that showed transudative effusion. Also vascular, general surgery and podiatry were consulted for pressure ulcers.  Blood pressure management has been complicated and patient has been started on Midodrine with the dose increasing to 20 mg TID and Florinef (now discontinued).  Patient also received several courses of antibiotics. Given stable need for Levophed at dose of 0.05mcg/kg/min pt ultimately transferred to [facility] on 3/1.    Pertinent Physical Exam At Time of Discharge    Physical Exam  Constitutional:       Appearance: She is not toxic-appearing.   HENT:      Head: Normocephalic.      Nose: Nose normal.      Mouth/Throat:      Mouth: Mucous membranes are moist.   Eyes:      Pupils: Pupils are equal, round, and reactive to light.   Cardiovascular:      Rate and Rhythm: Normal rate and regular rhythm.   Pulmonary:      Effort: Pulmonary effort is normal. No respiratory distress.   Abdominal:      General: Abdomen is flat. There is no distension.      Palpations: Abdomen is soft.      Tenderness: There is no abdominal tenderness.   Musculoskeletal:         General: No swelling or tenderness.      Right lower leg: Edema present.      Left lower leg: Edema present.   Skin:     General: Skin is warm.   Neurological:      General: No focal deficit present.      Mental  Status: She is alert.   Psychiatric:         Mood and Affect: Mood normal.         Thought Content: Thought content normal.     Home Medications     Medication List      START taking these medications     acetaminophen 325 mg tablet; Commonly known as: Tylenol; Take 2 tablets   (650 mg) by mouth every 4 hours if needed for mild pain (1 - 3) or   moderate pain (4 - 6).   ammonium lactate 12 % lotion; Commonly known as: Lac-Hydrin; Apply 1   Application topically every 1 hour if needed for dry skin.   benzocaine-menthoL 20-0.5 % topical spray; Commonly known as:   Dermoplast; Apply topically 4 times a day as needed for mild pain (1 - 3)   or irritation.   epoetin di-epbx 10,000 unit/mL injection; Commonly known as: Retacrit;   Inject 1 mL (10,000 Units) under the skin once a day on Monday, Wednesday,   and Friday.   * heparin 1,000 unit/mL injection; 2 mL (2,000 Units) by intra-catheter   route once daily on dialysis days.   * heparin 1,000 unit/mL injection; 2 mL (2,000 Units) by intra-catheter   route once daily on dialysis days. Do not start before March 4, 2024.   * heparin flush 100 unit/mL syringe; 1.6 mL (160 Units) by   intra-catheter route if needed for line care.   * heparin flush 100 unit/mL syringe; 1.6 mL (160 Units) by   intra-catheter route if needed for line care.   ipratropium-albuteroL 0.5-2.5 mg/3 mL nebulizer solution; Commonly known   as: Duo-Neb; Take 3 mL by nebulization 4 times a day as needed for   wheezing or shortness of breath.   norepinephrine in dextrose 5 % 32 mcg/mL solution infusion; Commonly   known as: Levophed; Infuse 1. mcg/min at 1..25 mL/hr into a   venous catheter continuously.   ondansetron 4 mg/2 mL injection; Commonly known as: Zofran; Infuse 2 mL   (4 mg) into a venous catheter every 6 hours if needed for nausea or   vomiting.   pantoprazole 40 mg EC tablet; Commonly known as: ProtoNix; Take 1 tablet   (40 mg) by mouth once daily. Do not crush, chew, or split.  Do not start   before March 2, 2024.   psyllium 3.4 gram packet; Commonly known as: Metamucil; Take 1 packet by   mouth 2 times a day.   sennosides-docusate sodium 8.6-50 mg tablet; Commonly known as:   Judy-Colace; Take 1 tablet by mouth as needed at bedtime for constipation.   vancomycin 125 mg capsule; Commonly known as: Vancocin; Take 1 capsule   (125 mg) by mouth 2 times a day.   zinc oxide 20 % ointment; Apply 1 Application topically 2 times a day.  * This list has 4 medication(s) that are the same as other medications   prescribed for you. Read the directions carefully, and ask your doctor or   other care provider to review them with you.     CHANGE how you take these medications     escitalopram 10 mg tablet; Commonly known as: Lexapro; Take 1 tablet (10   mg) by mouth once daily at bedtime.; What changed: See the new   instructions.   gabapentin 100 mg capsule; Commonly known as: Neurontin; Take 1 capsule   (100 mg) by mouth 3 times a day.; What changed: when to take this   midodrine 10 mg tablet; Commonly known as: Proamatine; Take 3 tablets   (30 mg) by mouth 3 times a day with meals.; What changed: medication   strength, how much to take   * nystatin 100,000 unit/gram powder; Commonly known as: Mycostatin;   Apply topically 2 times a day.; What changed: how to take this, when to   take this   * nystatin cream; Commonly known as: Mycostatin; Apply topically 2 times   a day.; What changed: You were already taking a medication with the same   name, and this prescription was added. Make sure you understand how and   when to take each.  * This list has 2 medication(s) that are the same as other medications   prescribed for you. Read the directions carefully, and ask your doctor or   other care provider to review them with you.     CONTINUE taking these medications     Dexcom G6 Sensor device; Generic drug: blood-glucose sensor; Check blood   sugar when needed and as instructed   doxycycline 100 mg capsule;  Commonly known as: Vibramycin; Take 1   capsule (100 mg) by mouth once daily. Take with at least 8 ounces (large   glass) of water, do not lie down for 30 minutes after Do not start before   March 2, 2024.   Eliquis 2.5 mg tablet; Generic drug: apixaban   insulin lispro 100 unit/mL injection; Commonly known as: HumaLOG; Inject   0-0.1 mL (0-10 Units) under the skin 3 times a day with meals. Take as   directed per insulin instructions.     STOP taking these medications     blood sugar diagnostic strip   febuxostat 40 mg tablet; Commonly known as: Uloric   fenofibrate 160 mg tablet; Commonly known as: Triglide   oxyCODONE-acetaminophen 5-325 mg tablet; Commonly known as: Percocet   simvastatin 20 mg tablet; Commonly known as: Zocor       Outpatient Follow-Up  Future Appointments   Date Time Provider Department Center   10/8/2024  8:45 AM Nick Ford MD [id number]       Luz Maria Vail MD

## 2024-03-25 NOTE — PROGRESS NOTES
Subjective   Patient ID: Ayanna Gross is a 73 y.o. female who is acute skilled care and presents for skilled nursing.    She is alert, pleasant, denies any shortness of breath and pain. Appetite okay. She participates in physical therapy         Review of Systems   Constitutional:  Negative for fever and unexpected weight change.   HENT:  Negative for sore throat.    Respiratory:  Negative for cough and shortness of breath.    Cardiovascular:  Negative for chest pain and palpitations.   Gastrointestinal:  Negative for abdominal pain, constipation, diarrhea, nausea and vomiting.   Genitourinary:  Negative for difficulty urinating and frequency.   Musculoskeletal:  Positive for arthralgias. Negative for back pain.   Skin:  Negative for rash.   Neurological:  Negative for dizziness, seizures and headaches.   Psychiatric/Behavioral:  Negative for agitation. The patient is not nervous/anxious.        Objective   There were no vitals taken for this visit.    Physical Exam  Vitals reviewed.   Constitutional:       General: She is not in acute distress.  HENT:      Mouth/Throat:      Mouth: Mucous membranes are moist.   Cardiovascular:      Rate and Rhythm: Regular rhythm.      Heart sounds: Normal heart sounds.   Pulmonary:      Breath sounds: Normal breath sounds. No rales.   Abdominal:      Palpations: Abdomen is soft.      Tenderness: There is no abdominal tenderness.   Musculoskeletal:         General: No tenderness.      Cervical back: Neck supple. No tenderness.   Skin:     General: Skin is warm.      Comments: Bilateral stage IV heel ulcers with no drainage   Neurological:      General: No focal deficit present.      Mental Status: She is alert.   Psychiatric:         Behavior: Behavior normal.       Diagnoses and all orders for this visit:  Paroxysmal atrial fibrillation (CMS/HCC) (Primary)  Type 2 diabetes mellitus with other specified complication, with long-term current use of insulin (CMS/[facility])  Mixed anxiety  and depressive disorder  Obstructive sleep apnea

## 2024-03-25 NOTE — PROGRESS NOTES
Subjective   Patient ID: Ayanna Gross is a 73 y.o. female who is acute skilled care and presents for skilled nursing.    She is alert, pleasant, denies any shortness of breath. Appetite fair.  She is c/o loose stools and abdominal discomfort, will continue vancomycin for C. difficile colitis         Review of Systems   Constitutional:  Negative for fever and unexpected weight change.   HENT:  Negative for sore throat.    Respiratory:  Negative for cough and shortness of breath.    Cardiovascular:  Negative for chest pain and palpitations.   Gastrointestinal:  Negative for abdominal pain, constipation, diarrhea, nausea and vomiting.   Genitourinary:  Negative for difficulty urinating and frequency.   Musculoskeletal:  Positive for arthralgias. Negative for back pain.   Skin:  Negative for rash.   Neurological:  Negative for dizziness, seizures and headaches.   Psychiatric/Behavioral:  Negative for agitation. The patient is not nervous/anxious.        Objective   There were no vitals taken for this visit.    Physical Exam  Vitals reviewed.   Constitutional:       General: She is not in acute distress.  HENT:      Mouth/Throat:      Mouth: Mucous membranes are moist.   Cardiovascular:      Rate and Rhythm: Regular rhythm.      Heart sounds: Normal heart sounds.   Pulmonary:      Breath sounds: Normal breath sounds. No rales.   Abdominal:      Palpations: Abdomen is soft.      Tenderness: There is no abdominal tenderness.   Musculoskeletal:         General: No tenderness.      Cervical back: Neck supple. No tenderness.   Skin:     General: Skin is warm.      Comments: Bilateral stage IV heel ulcers with no drainage   Neurological:      General: No focal deficit present.      Mental Status: She is alert.   Psychiatric:         Behavior: Behavior normal.       Diagnoses and all orders for this visit:  C. difficile colitis (Primary)  Primary hypertension  Stage 3 chronic kidney disease, unspecified whether stage 3a or 3b  CKD (CMS/[facility])  Depression, unspecified depression type  Obstructive sleep apnea  Will continue vancomycin, PT and OT

## 2024-05-27 PROBLEM — I46.9 CARDIAC ARREST (MULTI): Status: ACTIVE | Noted: 2024-01-01

## 2024-05-27 NOTE — ED PROVIDER NOTES
History of Present Illness   CC: Cardiac Arrest     History provided by: EMS  Limitations to History:  Intubated, cardiac arrest    HPI:  Ayanna Gross is a 74 y.o. female presenting to the emergency department in cardiac arrest.  She has a history of ESRD, MSSA bacteremia, persistent hypotension requiring chronic norepinephrine drip, midodrine, diabetes, chronic left pleural effusion, prior pneumonia, and is a resident in a long-term acute care facility.  She had a cardiac arrest in hemodialysis earlier today, and has presumed septic shock from urosepsis.  She arrives on vasopressin at 0.04, Levophed at 0.2 mcg/kg/min, epinephrine at 0.1 mcg/kg/min.  She was to be a direct admit to the CICU for further management of her decompensated shock however arrested during transport with [facility].  Arrives with CPR ongoing, intubated with end-tidal in the 30s.    External Records Reviewed: Reviewed paperwork from [facility]    Physical Exam   Triage vitals:  T    HR    BP    RR    O2        Vital signs reviewed in nursing triage note, EMR flow sheets, and at patient's bedside.   General: In cardiac arrest  Eyes: Pupils fixed, nonreactive  CV: Cardiac arrest, PEA on monitor  Resp: Intubated, bagged via BVM  GI: Soft, morbidly obese  MSK/Extremities: No gross bony deformities  Skin: Cold  Neuro: Unresponsive, in cardiac arrest    ED Course & Medical Decision Making   ED Course:  Diagnoses as of 05/27/24 1429   Cardiac arrest (Multi)       MDM:  74 y.o. female presenting to the emergency department in cardiac arrest.  She was to be admitted to the CICU for management of septic shock requiring 3 pressors.  On arrival, patient in cardiac arrest.  Seems chronically ill-appearing, having significant pulmonary secretions coming from the ET tube which were suctioned.  Patient moved over to ED stretcher, CPR continued and ACLS followed.  Patient given pushes of epinephrine per ACLS guidelines.  During all pulse checks, she remained in  PEA.  Blood gas was obtained demonstrating significant mixed metabolic and respiratory acidosis despite intubation, and ongoing CPR.  Lactate is elevated as well.  Point-of-care ultrasound utilized during cardiac arrest demonstrating no evidence of pericardial effusion.  ET tube was confirmed via positive end-tidal.  After blood gas confirms no reversible causes and given the patient's chronic comorbidities, we discussed that there is a low likelihood of meaningful neurologic recovery so we elected to cease resuscitative efforts.  After stopping CPR however, patient was agonal he breathing.  She was not yet comfort care despite ongoing discussions with the family so CPR was restarted, doses of vasopressors were increased.  Using point-of-care ultrasound, there initially was noted to be a weakly visible carotid pulse however at the next pulse check this was absent.  Patient had wanted to flash agonal respirations, and cardiac rhythm was bradycardic agonal PEA.  Given the ongoing utility of CPR, no meaningful signs of life despite adequate resuscitation.  Patient was allowed to pass away naturally.  Time of death called at 1358.  Moment of silence held.  Family updated when they arrived to the ED.    Disposition       Procedures   Critical Care    Performed by: Florentin Echavarria MD  Authorized by: Nayely Oliveira MD    Critical care provider statement:     Critical care time (minutes):  20    Critical care time was exclusive of:  Separately billable procedures and treating other patients and teaching time    Critical care was necessary to treat or prevent imminent or life-threatening deterioration of the following conditions:  Cardiac failure, shock and sepsis    Critical care was time spent personally by me on the following activities:  Development of treatment plan with patient or surrogate, evaluation of patient's response to treatment, obtaining history from patient or surrogate, pulse oximetry,  re-evaluation of patient's condition and review of old charts      Patient seen and discussed with ED attending physician.    Anderson Echavarria MD  PGY 3 Emergency Medicine         Florentin Echavarria MD  Resident  05/27/24 [id number]

## 2024-10-08 ENCOUNTER — APPOINTMENT (OUTPATIENT)
Dept: OPHTHALMOLOGY | Facility: CLINIC | Age: 74
End: 2024-10-08
Payer: MEDICARE

## 2025-01-12 NOTE — NURSING NOTE
Event Note Full skin assessment completed. See images and flowsheet for measurement and description. Pressure overlay topper in place. Pressure relief boots on patient. Dressing changes done per order. Ordered creams and powders applied per order. Foam applied to bony prominences. Q 2 turns provided for patient throughout shift. All pressure areas relieved by elevation/support or repositioning of patient.

## 2025-03-19 NOTE — PROGRESS NOTES
CONSULT PROGRESS NOTES    SERVICE DATE: 2/14/2024   SERVICE TIME: 11:27 AM    CONSULTING SERVICE: Nephrology    ASSESSMENT AND PLAN   1.  End-stage renal disease  2.  Hypotension  3.  Hyponatremia  4.  Hypokalemia  5.  Anemia of chronic kidney disease     She awaits dialysis today [facility] using low temperature and minimal fluid removal.  She has been tolerating roughly 1.5 L volume removal.  Now off of pressors for about 2 days now.  Hyponatremia from volume overload in this patient with anuria.  Hypokalemia improved, now using 3K bath.  I would not replace the potassium.  No need for daily chemistry panels.  Hemoglobin is low, using Retacrit 10,000 units IV 3 times a week with dialysis.  None of her options for disposition are great.  She has a poor overall prognosis, but it has been difficult to get the patient and her daughter/family to understand this.  I have had numerous discussions with the patient about where to proceed from here, but her main goals continue to be to see her grandchildren as long as possible while she is still is without significant pain.  She remains full code, despite numerous attempts from ICU team, palliative care, and myself to redefine and be more realistic with her goals of care.  Attempt dialysis today off pressors and off of albumin.  From a mental status standpoint, she seems to be tolerating her blood pressure where it is.    SUBJECTIVE  INTERVAL HPI: She feels okay.  No new issues.  Evidently dialysis was offered overnight, but she refused it.  She remains off of pressors.    MEDICATIONS:  apixaban, 2.5 mg, oral, BID  cefepime, 1 g, intravenous, q24h  doxycylcine, 100 mg, oral, Daily  epoetin di or biosimilar, 10,000 Units, intravenous, Every Mon/Wed/Fri  escitalopram, 10 mg, oral, Nightly  fludrocortisone, 0.1 mg, oral, TID  gabapentin, 100 mg, oral, TID  heparin, 2,000 Units, intra-catheter, After Dialysis  heparin, 2,000 Units, intra-catheter, After Dialysis  heparin, 2,000  Please schedule patient for virtual appointment to discuss [facility] lab results.  Thanks!     Units, intra-catheter, After Dialysis  heparin, 2,000 Units, intra-catheter, After Dialysis  insulin lispro, 0-10 Units, subcutaneous, TID with meals  ipratropium-albuteroL, 3 mL, nebulization, q6h while awake  lidocaine, 5 mL, infiltration, Once  midodrine, 15 mg, oral, TID with meals  nystatin, , Topical, BID  nystatin, , Topical, BID  pantoprazole, 40 mg, oral, Daily   Or  pantoprazole, 40 mg, intravenous, Daily  perflutren lipid microspheres, 0.5-10 mL of dilution, intravenous, Once in imaging  perflutren protein A microsphere, 0.5 mL, intravenous, Once in imaging  psyllium, 1 packet, oral, BID  sodium chloride, 3 mL, nebulization, TID  sulfur hexafluoride microsphr, 2 mL, intravenous, Once in imaging  sulfur hexafluoride microsphr, 2 mL, intravenous, Once in imaging  vancomycin, 125 mg, oral, BID  vancomycin, 750 mg, intravenous, q24h  zinc oxide, 1 Application, Topical, BID       norepinephrine, 0.01-3 mcg/kg/min, Last Rate: Stopped (02/12/24 1700)       PRN medications: acetaminophen, alteplase, ammonium lactate, benzocaine-menthoL, dextrose 10 % in water (D10W), dextrose, glucagon, ondansetron, oxygen, sennosides-docusate sodium     OBJECTIVE  PHYSICAL EXAM:   Heart Rate:  []   Temp:  [36.1 °C (97 °F)-36.9 °C (98.4 °F)]   Resp:  [16-23]   BP: ()/()   SpO2:  [90 %-96 %]   Body mass index is 36.13 kg/m².  Chronically ill-appearing elderly white woman  Pale skin  Right-sided hand swelling  Left-sided finger amputation  Internal jugular tunneled hemodialysis catheter in place  There is no significant pretibial edema on both lower extremities  Soft abdomen  No Amos  No obvious joint deformities  Moist mucosa  Hearing seems to be intact  Phonation intact  Rectal tube in place       DATA:   Labs:  Results for orders placed or performed during the hospital encounter of 01/19/24 (from the past 96 hour(s))   POCT GLUCOSE   Result Value Ref Range    POCT Glucose 178 (H) 74 - 99 mg/dL   Tissue/Wound  Culture/Smear    Specimen: Wound/Tissue; Tissue/Biopsy   Result Value Ref Range    Tissue/Wound Culture/Smear (4+) Abundant Pseudomonas aeruginosa (A)     Tissue/Wound Culture/Smear (3+) Moderate Proteus mirabilis (A)     Gram Stain (2+) Few Polymorphonuclear leukocytes (A)     Gram Stain (2+) Few Gram negative bacilli (A)        Susceptibility    Proteus mirabilis - MICROSCAN     Ampicillin  Susceptible      Cefazolin  Susceptible      Ciprofloxacin  Susceptible      Gentamicin  Susceptible      Levofloxacin  Susceptible      Piperacillin/Tazobactam  Susceptible      Tetracycline  Resistant      Trimethoprim/Sulfamethoxazole  Susceptible     Pseudomonas aeruginosa - MICROSCAN     Aztreonam  Susceptible      Cefepime  Susceptible      Ceftazidime  Susceptible      Ciprofloxacin  Susceptible      Levofloxacin  Susceptible      Piperacillin/Tazobactam  Susceptible      Tobramycin  Susceptible    Tissue/Wound Culture/Smear    Specimen: Other (specify in comments); Tissue/Biopsy   Result Value Ref Range    Tissue/Wound Culture/Smear (3+) Moderate Pseudomonas aeruginosa (A)     Tissue/Wound Culture/Smear (4+) Abundant Proteus mirabilis (A)     Tissue/Wound Culture/Smear (A)      (2+) Few Methicillin Resistant Staphylococcus aureus (MRSA)    Gram Stain (4+) Abundant Polymorphonuclear leukocytes (A)     Gram Stain (3+) Moderate Gram negative bacilli (A)        Susceptibility    Proteus mirabilis - MICROSCAN     Ampicillin  Susceptible      Cefazolin  Susceptible      Ciprofloxacin  Susceptible      Gentamicin  Susceptible      Levofloxacin  Susceptible      Piperacillin/Tazobactam  Susceptible      Tetracycline  Resistant      Trimethoprim/Sulfamethoxazole  Susceptible     Methicillin Resistant Staphylococcus aureus (MRSA) - MICROSCAN     Clindamycin  Resistant      Erythromycin  Resistant      Oxacillin  Resistant      Tetracycline  Resistant      Trimethoprim/Sulfamethoxazole  Susceptible      Vancomycin  Susceptible     POCT GLUCOSE   Result Value Ref Range    POCT Glucose 197 (H) 74 - 99 mg/dL   POCT GLUCOSE   Result Value Ref Range    POCT Glucose 180 (H) 74 - 99 mg/dL   CBC   Result Value Ref Range    WBC 11.0 4.4 - 11.3 x10*3/uL    nRBC 0.0 0.0 - 0.0 /100 WBCs    RBC 2.52 (L) 4.00 - 5.20 x10*6/uL    Hemoglobin 7.9 (L) 12.0 - 16.0 g/dL    Hematocrit 25.4 (L) 36.0 - 46.0 %     (H) 80 - 100 fL    MCH 31.3 26.0 - 34.0 pg    MCHC 31.1 (L) 32.0 - 36.0 g/dL    RDW 22.8 (H) 11.5 - 14.5 %    Platelets 228 150 - 450 x10*3/uL   Basic metabolic panel   Result Value Ref Range    Glucose 190 (H) 65 - 99 mg/dL    Sodium 131 (L) 133 - 145 mmol/L    Potassium 3.3 (L) 3.4 - 5.1 mmol/L    Chloride 99 97 - 107 mmol/L    Bicarbonate 20 (L) 24 - 31 mmol/L    Urea Nitrogen 16 8 - 25 mg/dL    Creatinine 2.70 (H) 0.40 - 1.60 mg/dL    eGFR 18 (L) >60 mL/min/1.73m*2    Calcium 8.6 8.5 - 10.4 mg/dL    Anion Gap 12 <=19 mmol/L   POCT GLUCOSE   Result Value Ref Range    POCT Glucose 165 (H) 74 - 99 mg/dL   POCT GLUCOSE   Result Value Ref Range    POCT Glucose 158 (H) 74 - 99 mg/dL   POCT GLUCOSE   Result Value Ref Range    POCT Glucose 118 (H) 74 - 99 mg/dL   POCT GLUCOSE   Result Value Ref Range    POCT Glucose 190 (H) 74 - 99 mg/dL   Basic Metabolic Panel   Result Value Ref Range    Glucose 166 (H) 65 - 99 mg/dL    Sodium 136 133 - 145 mmol/L    Potassium 3.4 3.4 - 5.1 mmol/L    Chloride 102 97 - 107 mmol/L    Bicarbonate 24 24 - 31 mmol/L    Urea Nitrogen 9 8 - 25 mg/dL    Creatinine 1.60 0.40 - 1.60 mg/dL    eGFR 34 (L) >60 mL/min/1.73m*2    Calcium 8.4 (L) 8.5 - 10.4 mg/dL    Anion Gap 10 <=19 mmol/L   CBC   Result Value Ref Range    WBC 10.3 4.4 - 11.3 x10*3/uL    nRBC 0.0 0.0 - 0.0 /100 WBCs    RBC 2.64 (L) 4.00 - 5.20 x10*6/uL    Hemoglobin 8.2 (L) 12.0 - 16.0 g/dL    Hematocrit 26.5 (L) 36.0 - 46.0 %     80 - 100 fL    MCH 31.1 26.0 - 34.0 pg    MCHC 30.9 (L) 32.0 - 36.0 g/dL    RDW 22.8 (H) 11.5 - 14.5 %    Platelets 173 150 - 450  x10*3/uL   POCT GLUCOSE   Result Value Ref Range    POCT Glucose 187 (H) 74 - 99 mg/dL   POCT GLUCOSE   Result Value Ref Range    POCT Glucose 190 (H) 74 - 99 mg/dL   POCT GLUCOSE   Result Value Ref Range    POCT Glucose 133 (H) 74 - 99 mg/dL   POCT GLUCOSE   Result Value Ref Range    POCT Glucose 170 (H) 74 - 99 mg/dL   Hepatitis B core antibody, IgM   Result Value Ref Range    Hepatitis B Core AB; IgM Nonreactive Nonreactive   Hepatitis B core antibody, total   Result Value Ref Range    Hepatitis B Core AB- Total Nonreactive Nonreactive   Hepatitis B surface antibody   Result Value Ref Range    Hepatitis B Surface AB 4.4 <10.0 mIU/mL   Hepatitis B surface antigen   Result Value Ref Range    Hepatitis B Surface AG Nonreactive Nonreactive   CBC and Auto Differential   Result Value Ref Range    WBC 10.7 4.4 - 11.3 x10*3/uL    nRBC 0.0 0.0 - 0.0 /100 WBCs    RBC 2.50 (L) 4.00 - 5.20 x10*6/uL    Hemoglobin 7.8 (L) 12.0 - 16.0 g/dL    Hematocrit 25.3 (L) 36.0 - 46.0 %     (H) 80 - 100 fL    MCH 31.2 26.0 - 34.0 pg    MCHC 30.8 (L) 32.0 - 36.0 g/dL    RDW 22.3 (H) 11.5 - 14.5 %    Platelets 191 150 - 450 x10*3/uL    Neutrophils % 60.6 40.0 - 80.0 %    Immature Granulocytes %, Automated 0.8 0.0 - 0.9 %    Lymphocytes % 21.9 13.0 - 44.0 %    Monocytes % 11.2 2.0 - 10.0 %    Eosinophils % 4.7 0.0 - 6.0 %    Basophils % 0.8 0.0 - 2.0 %    Neutrophils Absolute 6.47 (H) 1.60 - 5.50 x10*3/uL    Immature Granulocytes Absolute, Automated 0.09 0.00 - 0.50 x10*3/uL    Lymphocytes Absolute 2.33 0.80 - 3.00 x10*3/uL    Monocytes Absolute 1.19 (H) 0.05 - 0.80 x10*3/uL    Eosinophils Absolute 0.50 (H) 0.00 - 0.40 x10*3/uL    Basophils Absolute 0.08 0.00 - 0.10 x10*3/uL   Comprehensive Metabolic Panel   Result Value Ref Range    Glucose 183 (H) 65 - 99 mg/dL    Sodium 130 (L) 133 - 145 mmol/L    Potassium 3.7 3.4 - 5.1 mmol/L    Chloride 100 97 - 107 mmol/L    Bicarbonate 20 (L) 24 - 31 mmol/L    Urea Nitrogen 17 8 - 25 mg/dL     Creatinine 2.40 (H) 0.40 - 1.60 mg/dL    eGFR 21 (L) >60 mL/min/1.73m*2    Calcium 8.3 (L) 8.5 - 10.4 mg/dL    Albumin 2.6 (L) 3.5 - 5.0 g/dL    Alkaline Phosphatase 140 (H) 35 - 125 U/L    Total Protein 5.6 (L) 5.9 - 7.9 g/dL    AST 14 5 - 40 U/L    Bilirubin, Total 0.6 0.1 - 1.2 mg/dL    ALT 6 5 - 40 U/L    Anion Gap 10 <=19 mmol/L   Magnesium   Result Value Ref Range    Magnesium 1.50 (L) 1.60 - 3.10 mg/dL   Phosphorus   Result Value Ref Range    Phosphorus 2.5 2.5 - 4.5 mg/dL   Morphology   Result Value Ref Range    RBC Morphology No significant RBC morphology present    POCT GLUCOSE   Result Value Ref Range    POCT Glucose 158 (H) 74 - 99 mg/dL   POCT GLUCOSE   Result Value Ref Range    POCT Glucose 163 (H) 74 - 99 mg/dL   POCT GLUCOSE   Result Value Ref Range    POCT Glucose 170 (H) 74 - 99 mg/dL   POCT GLUCOSE   Result Value Ref Range    POCT Glucose 212 (H) 74 - 99 mg/dL   Vancomycin   Result Value Ref Range    Vancomycin 13.9 10.0 - 20.0 ug/mL   Lavender Top   Result Value Ref Range    Extra Tube Hold for add-ons.    POCT GLUCOSE   Result Value Ref Range    POCT Glucose 163 (H) 74 - 99 mg/dL         SIGNATURE: Saeed Mondragon MD PATIENT NAME: Ayanna Gross   DATE: February 14, 2024 MRN: [id number]   TIME: 11:27 AM PAGER: [phone]